# Patient Record
Sex: FEMALE | Race: BLACK OR AFRICAN AMERICAN | Employment: OTHER | URBAN - METROPOLITAN AREA
[De-identification: names, ages, dates, MRNs, and addresses within clinical notes are randomized per-mention and may not be internally consistent; named-entity substitution may affect disease eponyms.]

---

## 2018-08-06 ENCOUNTER — HOSPITAL ENCOUNTER (EMERGENCY)
Age: 78
Discharge: HOME | End: 2018-08-06
Attending: EMERGENCY MEDICINE
Payer: MEDICARE

## 2018-08-06 VITALS
WEIGHT: 189 LBS | HEART RATE: 85 BPM | RESPIRATION RATE: 16 BRPM | TEMPERATURE: 98.5 F | BODY MASS INDEX: 34.78 KG/M2 | OXYGEN SATURATION: 99 % | SYSTOLIC BLOOD PRESSURE: 158 MMHG | HEIGHT: 62 IN | DIASTOLIC BLOOD PRESSURE: 74 MMHG

## 2018-08-06 DIAGNOSIS — L03.818 CELLULITIS OF OTHER SPECIFIED SITE: Primary | ICD-10-CM

## 2018-08-06 DIAGNOSIS — R73.9 HYPERGLYCEMIA: ICD-10-CM

## 2018-08-06 LAB
ALBUMIN SERPL-MCNC: 3.1 G/DL (ref 3.2–4.6)
ALBUMIN/GLOB SERPL: 0.8 {RATIO} (ref 1.2–3.5)
ALP SERPL-CCNC: 143 U/L (ref 50–136)
ALT SERPL-CCNC: 92 U/L (ref 12–65)
ANION GAP SERPL CALC-SCNC: 8 MMOL/L (ref 7–16)
AST SERPL-CCNC: 71 U/L (ref 15–37)
BASOPHILS # BLD: 0 K/UL (ref 0–0.2)
BASOPHILS NFR BLD: 0 % (ref 0–2)
BILIRUB SERPL-MCNC: 0.2 MG/DL (ref 0.2–1.1)
BUN SERPL-MCNC: 36 MG/DL (ref 8–23)
CALCIUM SERPL-MCNC: 8.6 MG/DL (ref 8.3–10.4)
CHLORIDE SERPL-SCNC: 104 MMOL/L (ref 98–107)
CO2 SERPL-SCNC: 31 MMOL/L (ref 21–32)
CREAT SERPL-MCNC: 1.78 MG/DL (ref 0.6–1)
DIFFERENTIAL METHOD BLD: ABNORMAL
EOSINOPHIL # BLD: 0.2 K/UL (ref 0–0.8)
EOSINOPHIL NFR BLD: 3 % (ref 0.5–7.8)
ERYTHROCYTE [DISTWIDTH] IN BLOOD BY AUTOMATED COUNT: 14.2 % (ref 11.9–14.6)
ERYTHROCYTE [SEDIMENTATION RATE] IN BLOOD: 48 MM/HR (ref 0–30)
GLOBULIN SER CALC-MCNC: 3.9 G/DL (ref 2.3–3.5)
GLUCOSE BLD STRIP.AUTO-MCNC: 246 MG/DL (ref 65–100)
GLUCOSE SERPL-MCNC: 330 MG/DL (ref 65–100)
HCT VFR BLD AUTO: 34.8 % (ref 35.8–46.3)
HGB BLD-MCNC: 10.9 G/DL (ref 11.7–15.4)
IMM GRANULOCYTES # BLD: 0 K/UL (ref 0–0.5)
IMM GRANULOCYTES NFR BLD AUTO: 0 % (ref 0–5)
LYMPHOCYTES # BLD: 1.4 K/UL (ref 0.5–4.6)
LYMPHOCYTES NFR BLD: 18 % (ref 13–44)
MCH RBC QN AUTO: 29.9 PG (ref 26.1–32.9)
MCHC RBC AUTO-ENTMCNC: 31.3 G/DL (ref 31.4–35)
MCV RBC AUTO: 95.6 FL (ref 79.6–97.8)
MONOCYTES # BLD: 0.4 K/UL (ref 0.1–1.3)
MONOCYTES NFR BLD: 6 % (ref 4–12)
NEUTS SEG # BLD: 5.7 K/UL (ref 1.7–8.2)
NEUTS SEG NFR BLD: 73 % (ref 43–78)
PLATELET # BLD AUTO: 220 K/UL (ref 150–450)
PMV BLD AUTO: 10.8 FL (ref 10.8–14.1)
POTASSIUM SERPL-SCNC: 4.7 MMOL/L (ref 3.5–5.1)
PROT SERPL-MCNC: 7 G/DL (ref 6.3–8.2)
RBC # BLD AUTO: 3.64 M/UL (ref 4.05–5.25)
SODIUM SERPL-SCNC: 143 MMOL/L (ref 136–145)
WBC # BLD AUTO: 7.8 K/UL (ref 4.3–11.1)

## 2018-08-06 PROCEDURE — 99283 EMERGENCY DEPT VISIT LOW MDM: CPT | Performed by: EMERGENCY MEDICINE

## 2018-08-06 PROCEDURE — 96361 HYDRATE IV INFUSION ADD-ON: CPT | Performed by: EMERGENCY MEDICINE

## 2018-08-06 PROCEDURE — 74011250637 HC RX REV CODE- 250/637: Performed by: EMERGENCY MEDICINE

## 2018-08-06 PROCEDURE — 85652 RBC SED RATE AUTOMATED: CPT

## 2018-08-06 PROCEDURE — 74011636637 HC RX REV CODE- 636/637: Performed by: EMERGENCY MEDICINE

## 2018-08-06 PROCEDURE — 82962 GLUCOSE BLOOD TEST: CPT

## 2018-08-06 PROCEDURE — 74011250636 HC RX REV CODE- 250/636: Performed by: EMERGENCY MEDICINE

## 2018-08-06 PROCEDURE — 96374 THER/PROPH/DIAG INJ IV PUSH: CPT | Performed by: EMERGENCY MEDICINE

## 2018-08-06 PROCEDURE — 85025 COMPLETE CBC W/AUTO DIFF WBC: CPT

## 2018-08-06 PROCEDURE — 80053 COMPREHEN METABOLIC PANEL: CPT

## 2018-08-06 RX ORDER — DIPHENHYDRAMINE HCL 25 MG
25 CAPSULE ORAL
Status: COMPLETED | OUTPATIENT
Start: 2018-08-06 | End: 2018-08-06

## 2018-08-06 RX ORDER — CLINDAMYCIN HYDROCHLORIDE 300 MG/1
300 CAPSULE ORAL 4 TIMES DAILY
Qty: 28 CAP | Refills: 0 | Status: SHIPPED | OUTPATIENT
Start: 2018-08-06 | End: 2018-08-13

## 2018-08-06 RX ADMIN — SODIUM CHLORIDE 1000 ML: 900 INJECTION, SOLUTION INTRAVENOUS at 14:38

## 2018-08-06 RX ADMIN — DIPHENHYDRAMINE HYDROCHLORIDE 25 MG: 25 CAPSULE ORAL at 16:04

## 2018-08-06 RX ADMIN — INSULIN HUMAN 5 UNITS: 100 INJECTION, SOLUTION PARENTERAL at 14:38

## 2018-08-06 NOTE — ED NOTES
I have reviewed discharge instructions with the patient. The patient verbalized understanding. Patient left ED via Discharge Method: ambulatory to Home with daughter  Opportunity for questions and clarification provided. Patient given 1 scripts. To continue your aftercare when you leave the hospital, you may receive an automated call from our care team to check in on how you are doing. This is a free service and part of our promise to provide the best care and service to meet your aftercare needs.  If you have questions, or wish to unsubscribe from this service please call [phone]. Thank you for Choosing our [facility] Department.

## 2018-08-06 NOTE — ED TRIAGE NOTES
Per patient/family x1 month rash throughout extremities and swelling to portion of eyes, states treated previously with no resolution of problem.

## 2018-08-06 NOTE — DISCHARGE INSTRUCTIONS
Cellulitis: Care Instructions  Your Care Instructions    Cellulitis is a skin infection caused by bacteria, most often strep or staph. It often occurs after a break in the skin from a scrape, cut, bite, or puncture, or after a rash. Cellulitis may be treated without doing tests to find out what caused it. But your doctor may do tests, if needed, to look for a specific bacteria, like methicillin-resistant Staphylococcus aureus (MRSA). The doctor has checked you carefully, but problems can develop later. If you notice any problems or new symptoms, get medical treatment right away. Follow-up care is a key part of your treatment and safety. Be sure to make and go to all appointments, and call your doctor if you are having problems. It's also a good idea to know your test results and keep a list of the medicines you take. How can you care for yourself at home? · Take your antibiotics as directed. Do not stop taking them just because you feel better. You need to take the full course of antibiotics. · Prop up the infected area on pillows to reduce pain and swelling. Try to keep the area above the level of your heart as often as you can. · If your doctor told you how to care for your wound, follow your doctor's instructions. If you did not get instructions, follow this general advice:  ¨ Wash the wound with clean water 2 times a day. Don't use hydrogen peroxide or alcohol, which can slow healing. ¨ You may cover the wound with a thin layer of petroleum jelly, such as Vaseline, and a nonstick bandage. ¨ Apply more petroleum jelly and replace the bandage as needed. · Be safe with medicines. Take pain medicines exactly as directed. ¨ If the doctor gave you a prescription medicine for pain, take it as prescribed. ¨ If you are not taking a prescription pain medicine, ask your doctor if you can take an over-the-counter medicine.   To prevent cellulitis in the future  · Try to prevent cuts, scrapes, or other injuries to your skin. Cellulitis most often occurs where there is a break in the skin. · If you get a scrape, cut, mild burn, or bite, wash the wound with clean water as soon as you can to help avoid infection. Don't use hydrogen peroxide or alcohol, which can slow healing. · If you have swelling in your legs (edema), support stockings and good skin care may help prevent leg sores and cellulitis. · Take care of your feet, especially if you have diabetes or other conditions that increase the risk of infection. Wear shoes and socks. Do not go barefoot. If you have athlete's foot or other skin problems on your feet, talk to your doctor about how to treat them. When should you call for help? Call your doctor now or seek immediate medical care if:    · You have signs that your infection is getting worse, such as:  ¨ Increased pain, swelling, warmth, or redness. ¨ Red streaks leading from the area. ¨ Pus draining from the area. ¨ A fever.     · You get a rash.    Watch closely for changes in your health, and be sure to contact your doctor if:    · You do not get better as expected. Where can you learn more? Go to http://livier-andres.info/. Constanza Band in the search box to learn more about \"Cellulitis: Care Instructions. \"  Current as of: May 10, 2017  Content Version: 11.7  © 1640-7877 [employer]. Care instructions adapted under license by [employer] (which disclaims liability or warranty for this information). If you have questions about a medical condition or this instruction, always ask your healthcare professional. [place] [address] any warranty or liability for your use of this information.

## 2018-08-06 NOTE — ED PROVIDER NOTES
HPI Comments: 78-year-old female with history of hypertension, gout, diabetes presents with complaint of rash times one month to bilateral upper and lower extremities. States that over the past several days her rash has progressively worsen. Patient additionally reports she's noticed mild swelling to upper eyelids. States she is previously treated but no resolution of her symptoms. Patient denies fever, chills, nausea, vomiting, CP, SOB, hemoptysis. Denies recent new environmental exposure. Denies history of significant drug allergy or food allergy. Denies history of similar symptoms in the past.    Patient is a [de-identified] y.o. female presenting with rash. The history is provided by the patient. No  was used. Rash    This is a new problem. The current episode started more than 1 week ago. The problem has not changed since onset. There has been no fever. The rash is present on the right lower leg, left lower leg, left arm and right arm. The pain is at a severity of 0/10. The patient is experiencing no pain. The pain has been constant since onset. Pertinent negatives include no blisters, no pain, no weeping and no hives. She has tried nothing for the symptoms. The treatment provided no relief.         Past Medical History:   Diagnosis Date    CAD (coronary artery disease)     DM2 (diabetes mellitus, type 2) (HCC)     Gout     HLD (hyperlipidemia)     HTN (hypertension)     Peripheral neuropathy        Past Surgical History:   Procedure Laterality Date    CARDIAC SURG PROCEDURE UNLIST      stents x 3     HX CHOLECYSTECTOMY      HX CORONARY ARTERY BYPASS GRAFT      x3    HX TUBAL LIGATION           Family History:   Problem Relation Age of Onset    Hypertension Mother     Cancer Mother     Diabetes Mother     Heart Disease Mother        Social History     Social History    Marital status: SINGLE     Spouse name: N/A    Number of children: N/A    Years of education: N/A     Occupational History    Not on file. Social History Main Topics    Smoking status: Never Smoker    Smokeless tobacco: Never Used    Alcohol use No    Drug use: No    Sexual activity: Not on file     Other Topics Concern    Not on file     Social History Narrative         ALLERGIES: Sulfa (sulfonamide antibiotics)    Review of Systems   Constitutional: Negative for chills, fatigue and fever. HENT: Negative for congestion and rhinorrhea. Respiratory: Negative for cough and shortness of breath. Cardiovascular: Negative for chest pain and palpitations. Gastrointestinal: Negative for abdominal pain, diarrhea, nausea and vomiting. Genitourinary: Negative for dysuria, flank pain, genital sores, hematuria, pelvic pain, vaginal bleeding and vaginal discharge. Musculoskeletal: Negative for back pain, gait problem, joint swelling, neck pain and neck stiffness. Skin: Positive for rash. Negative for pallor. Neurological: Negative for dizziness, seizures, syncope, weakness, numbness and headaches. Psychiatric/Behavioral: Negative for agitation and confusion. Vitals:    08/06/18 1316   BP: 139/72   Pulse: 80   Resp: 18   Temp: 98.9 °F (37.2 °C)   SpO2: 95%   Weight: 85.7 kg (189 lb)   Height: 5' 2\" (1.575 m)            Physical Exam   Constitutional: She is oriented to person, place, and time. Patient well appearing in no acute distress. Nontoxic in appearance. HENT:   Head: Normocephalic. Mouth/Throat: Oropharynx is clear and moist. No oropharyngeal exudate. MMM. Uvula midline. No posterior oropharynx edema or swelling noted. No oral lesions noted. No stridor. No significant facial swelling or eyelid swelling noted. Eyes: Conjunctivae and EOM are normal. Pupils are equal, round, and reactive to light. Neck: Normal range of motion. No JVD present. No tracheal deviation present. Cardiovascular: Normal rate, regular rhythm, normal heart sounds and intact distal pulses.     Pulses 2+ and equal throughout. Pulmonary/Chest: Effort normal and breath sounds normal. She has no rales. CTAB. No stridor or wheezing noted. Abdominal: Soft. Soft, NTND. No rebound or guarding. No CVAT. Musculoskeletal: Normal range of motion. She exhibits no edema. Neurological: She is alert and oriented to person, place, and time. No cranial nerve deficit. Coordination normal.   Skin: Skin is warm and dry. Rash noted. There is erythema. Mild erythematous rash localized to bilateral upper extremities and lower extremities; concern for cellulitis to lower extremities. No urticaria. No drainage noted. Psychiatric: She has a normal mood and affect. Her behavior is normal.   Nursing note and vitals reviewed. MDM  Number of Diagnoses or Management Options  Cellulitis of other specified site: new and requires workup  Hyperglycemia: new and requires workup  Diagnosis management comments: Patient treated with IV fluid hydration, 5 units of insulin. WBC normal. ESR elevated. No evidence of allergic reaction. Will discharge home with Abx coverage. [facility] office and was able to make a follow-up appointment tomorrow at 10 AM at [facility] w/ Kailey Arredondo. Given should return precautions.        Amount and/or Complexity of Data Reviewed  Clinical lab tests: ordered and reviewed  Tests in the medicine section of CPT®: ordered and reviewed  Review and summarize past medical records: yes  Independent visualization of images, tracings, or specimens: yes    Risk of Complications, Morbidity, and/or Mortality  Presenting problems: moderate  Diagnostic procedures: low  Management options: low    Patient Progress  Patient progress: stable        ED Course       Procedures     Results Include:    Recent Results (from the past 24 hour(s))   CBC WITH AUTOMATED DIFF    Collection Time: 08/06/18  1:22 PM   Result Value Ref Range    WBC 7.8 4.3 - 11.1 K/uL    RBC 3.64 (L) 4.05 - 5.25 M/uL    HGB 10.9 (L) 11.7 - 15.4 g/dL    HCT 34.8 (L) 35.8 - 46.3 %    MCV 95.6 79.6 - 97.8 FL    MCH 29.9 26.1 - 32.9 PG    MCHC 31.3 (L) 31.4 - 35.0 g/dL    RDW 14.2 11.9 - 14.6 %    PLATELET 939 793 - 057 K/uL    MPV 10.8 10.8 - 14.1 FL    DF AUTOMATED      NEUTROPHILS 73 43 - 78 %    LYMPHOCYTES 18 13 - 44 %    MONOCYTES 6 4.0 - 12.0 %    EOSINOPHILS 3 0.5 - 7.8 %    BASOPHILS 0 0.0 - 2.0 %    IMMATURE GRANULOCYTES 0 0.0 - 5.0 %    ABS. NEUTROPHILS 5.7 1.7 - 8.2 K/UL    ABS. LYMPHOCYTES 1.4 0.5 - 4.6 K/UL    ABS. MONOCYTES 0.4 0.1 - 1.3 K/UL    ABS. EOSINOPHILS 0.2 0.0 - 0.8 K/UL    ABS. BASOPHILS 0.0 0.0 - 0.2 K/UL    ABS. IMM. GRANS. 0.0 0.0 - 0.5 K/UL   METABOLIC PANEL, COMPREHENSIVE    Collection Time: 08/06/18  1:22 PM   Result Value Ref Range    Sodium 143 136 - 145 mmol/L    Potassium 4.7 3.5 - 5.1 mmol/L    Chloride 104 98 - 107 mmol/L    CO2 31 21 - 32 mmol/L    Anion gap 8 7 - 16 mmol/L    Glucose 330 (H) 65 - 100 mg/dL    BUN 36 (H) 8 - 23 MG/DL    Creatinine 1.78 (H) 0.6 - 1.0 MG/DL    GFR est AA 35 (L) >60 ml/min/1.73m2    GFR est non-AA 29 (L) >60 ml/min/1.73m2    Calcium 8.6 8.3 - 10.4 MG/DL    Bilirubin, total 0.2 0.2 - 1.1 MG/DL    ALT (SGPT) 92 (H) 12 - 65 U/L    AST (SGOT) 71 (H) 15 - 37 U/L    Alk. phosphatase 143 (H) 50 - 136 U/L    Protein, total 7.0 6.3 - 8.2 g/dL    Albumin 3.1 (L) 3.2 - 4.6 g/dL    Globulin 3.9 (H) 2.3 - 3.5 g/dL    A-G Ratio 0.8 (L) 1.2 - 3.5           Primitivo Barrientos MD; 8/6/2018 @2:04 PM Voice dictation software was used during the making of this note. This software is not perfect and grammatical and other typographical errors may be present.   This note has not been proofread for errors.  ===================================================================

## 2018-12-22 ENCOUNTER — HOSPITAL ENCOUNTER (EMERGENCY)
Age: 78
Discharge: HOME | End: 2018-12-22
Attending: EMERGENCY MEDICINE
Payer: MEDICARE

## 2018-12-22 VITALS
SYSTOLIC BLOOD PRESSURE: 124 MMHG | RESPIRATION RATE: 20 BRPM | DIASTOLIC BLOOD PRESSURE: 64 MMHG | BODY MASS INDEX: 32.43 KG/M2 | TEMPERATURE: 97.5 F | HEART RATE: 65 BPM | WEIGHT: 183 LBS | HEIGHT: 63 IN | OXYGEN SATURATION: 96 %

## 2018-12-22 DIAGNOSIS — R04.0 EPISTAXIS: Primary | ICD-10-CM

## 2018-12-22 DIAGNOSIS — R73.9 HYPERGLYCEMIA: ICD-10-CM

## 2018-12-22 LAB
ALBUMIN SERPL-MCNC: 3.6 G/DL (ref 3.2–4.6)
ALBUMIN/GLOB SERPL: 0.9 {RATIO} (ref 1.2–3.5)
ALP SERPL-CCNC: 133 U/L (ref 50–136)
ALT SERPL-CCNC: 68 U/L (ref 12–65)
ANION GAP SERPL CALC-SCNC: 8 MMOL/L (ref 7–16)
AST SERPL-CCNC: 17 U/L (ref 15–37)
BILIRUB SERPL-MCNC: 0.3 MG/DL (ref 0.2–1.1)
BUN SERPL-MCNC: 67 MG/DL (ref 8–23)
CALCIUM SERPL-MCNC: 9.2 MG/DL (ref 8.3–10.4)
CHLORIDE SERPL-SCNC: 102 MMOL/L (ref 98–107)
CO2 SERPL-SCNC: 26 MMOL/L (ref 21–32)
CREAT SERPL-MCNC: 2.22 MG/DL (ref 0.6–1)
ERYTHROCYTE [DISTWIDTH] IN BLOOD BY AUTOMATED COUNT: 13.9 % (ref 11.9–14.6)
GLOBULIN SER CALC-MCNC: 4.2 G/DL (ref 2.3–3.5)
GLUCOSE BLD STRIP.AUTO-MCNC: 260 MG/DL (ref 65–100)
GLUCOSE BLD STRIP.AUTO-MCNC: 396 MG/DL (ref 65–100)
GLUCOSE BLD STRIP.AUTO-MCNC: 424 MG/DL (ref 65–100)
GLUCOSE SERPL-MCNC: 482 MG/DL (ref 65–100)
HCT VFR BLD AUTO: 35.3 % (ref 35.8–46.3)
HGB BLD-MCNC: 11.1 G/DL (ref 11.7–15.4)
MCH RBC QN AUTO: 29.3 PG (ref 26.1–32.9)
MCHC RBC AUTO-ENTMCNC: 31.4 G/DL (ref 31.4–35)
MCV RBC AUTO: 93.1 FL (ref 79.6–97.8)
NRBC # BLD: 0 K/UL (ref 0–0.2)
PLATELET # BLD AUTO: 192 K/UL (ref 150–450)
PMV BLD AUTO: 12.4 FL (ref 9.4–12.3)
POTASSIUM SERPL-SCNC: 5.8 MMOL/L (ref 3.5–5.1)
PROT SERPL-MCNC: 7.8 G/DL (ref 6.3–8.2)
RBC # BLD AUTO: 3.79 M/UL (ref 4.05–5.2)
SODIUM SERPL-SCNC: 136 MMOL/L (ref 136–145)
WBC # BLD AUTO: 5.6 K/UL (ref 4.3–11.1)

## 2018-12-22 PROCEDURE — 74011250636 HC RX REV CODE- 250/636: Performed by: EMERGENCY MEDICINE

## 2018-12-22 PROCEDURE — 96361 HYDRATE IV INFUSION ADD-ON: CPT | Performed by: EMERGENCY MEDICINE

## 2018-12-22 PROCEDURE — 96374 THER/PROPH/DIAG INJ IV PUSH: CPT | Performed by: EMERGENCY MEDICINE

## 2018-12-22 PROCEDURE — 96376 TX/PRO/DX INJ SAME DRUG ADON: CPT | Performed by: EMERGENCY MEDICINE

## 2018-12-22 PROCEDURE — 74011250637 HC RX REV CODE- 250/637: Performed by: EMERGENCY MEDICINE

## 2018-12-22 PROCEDURE — 85027 COMPLETE CBC AUTOMATED: CPT

## 2018-12-22 PROCEDURE — 74011000250 HC RX REV CODE- 250: Performed by: EMERGENCY MEDICINE

## 2018-12-22 PROCEDURE — 74011636637 HC RX REV CODE- 636/637: Performed by: EMERGENCY MEDICINE

## 2018-12-22 PROCEDURE — 99285 EMERGENCY DEPT VISIT HI MDM: CPT | Performed by: EMERGENCY MEDICINE

## 2018-12-22 PROCEDURE — 80053 COMPREHEN METABOLIC PANEL: CPT

## 2018-12-22 PROCEDURE — 82962 GLUCOSE BLOOD TEST: CPT

## 2018-12-22 RX ORDER — OXYMETAZOLINE HCL 0.05 %
2 SPRAY, NON-AEROSOL (ML) NASAL
Status: COMPLETED | OUTPATIENT
Start: 2018-12-22 | End: 2018-12-22

## 2018-12-22 RX ORDER — SODIUM CHLORIDE 9 MG/ML
2000 INJECTION, SOLUTION INTRAVENOUS CONTINUOUS
Status: DISCONTINUED | OUTPATIENT
Start: 2018-12-22 | End: 2018-12-22 | Stop reason: HOSPADM

## 2018-12-22 RX ORDER — SILVER NITRATE 38.21; 12.74 MG/1; MG/1
1 STICK TOPICAL ONCE
Status: COMPLETED | OUTPATIENT
Start: 2018-12-22 | End: 2018-12-22

## 2018-12-22 RX ADMIN — OXYMETAZOLINE HYDROCHLORIDE 2 SPRAY: 5 SPRAY NASAL at 12:44

## 2018-12-22 RX ADMIN — INSULIN HUMAN 10 UNITS: 100 INJECTION, SOLUTION PARENTERAL at 13:26

## 2018-12-22 RX ADMIN — SODIUM CHLORIDE 2000 ML: 900 INJECTION, SOLUTION INTRAVENOUS at 12:47

## 2018-12-22 RX ADMIN — SILVER NITRATE APPLICATORS 1 APPLICATOR: 25; 75 STICK TOPICAL at 13:00

## 2018-12-22 RX ADMIN — SODIUM CHLORIDE 1000 ML: 900 INJECTION, SOLUTION INTRAVENOUS at 15:13

## 2018-12-22 RX ADMIN — INSULIN HUMAN 10 UNITS: 100 INJECTION, SOLUTION PARENTERAL at 15:11

## 2018-12-22 NOTE — DISCHARGE INSTRUCTIONS
Nosebleeds: Care Instructions  Your Care Instructions    Nosebleeds are common, especially if you have colds or allergies. Many things can cause a nosebleed. Some nosebleeds stop on their own with pressure. Others need packing. Some get cauterized (sealed). If you have gauze or other packing materials in your nose, you will need to follow up with your doctor to have the packing removed. You may need more treatment if you get nosebleeds a lot. The doctor has checked you carefully, but problems can develop later. If you notice any problems or new symptoms, get medical treatment right away. Follow-up care is a key part of your treatment and safety. Be sure to make and go to all appointments, and call your doctor if you are having problems. It's also a good idea to know your test results and keep a list of the medicines you take. How can you care for yourself at home? · If you get another nosebleed:  ? Sit up and tilt your head slightly forward. This keeps blood from going down your throat. ? Use your thumb and index finger to pinch your nose shut for 10 minutes. Use a clock. Do not check to see if the bleeding has stopped before the 10 minutes are up. If the bleeding has not stopped, pinch your nose shut for another 10 minutes. ? When the bleeding has stopped, try not to pick, rub, or blow your nose for 12 hours. Avoiding these things helps keep your nose from bleeding again. · If your doctor prescribed antibiotics, take them as directed. Do not stop taking them just because you feel better. You need to take the full course of antibiotics. To prevent nosebleeds  · Do not blow your nose too hard. · Try not to lift or strain after a nosebleed. · Raise your head on a pillow while you sleep. · Put a thin layer of a saline- or water-based nasal gel, such as NasoGel, inside your nose. Put it on the septum, which divides your nostrils. This will prevent dryness that can cause nosebleeds.   · Use a vaporizer or humidifier to add moisture to your bedroom. Follow the directions for cleaning the machine. · Do not use aspirin, ibuprofen (Advil, Motrin), or naproxen (Aleve) for 36 to 48 hours after a nosebleed unless your doctor tells you to. You can use acetaminophen (Tylenol) for pain relief. · Talk to your doctor about stopping any other medicines you are taking. Some medicines may make you more likely to get a nosebleed. · Do not use cold medicines or nasal sprays without first talking to your doctor. They can make your nose dry. When should you call for help? Call 911 anytime you think you may need emergency care. For example, call if:    · You passed out (lost consciousness).    Call your doctor now or seek immediate medical care if:    · You get another nosebleed and your nose is still bleeding after you have applied pressure 3 times for 10 minutes each time (30 minutes total).     · There is a lot of blood running down the back of your throat even after you pinch your nose and tilt your head forward.     · You have a fever.     · You have sinus pain.    Watch closely for changes in your health, and be sure to contact your doctor if:    · You get nosebleeds often, even if they stop.     · You do not get better as expected. Where can you learn more? Go to http://livier-andres.info/. Enter S156 in the search box to learn more about \"Nosebleeds: Care Instructions. \"  Current as of: November 20, 2017  Content Version: 11.8  © 5487-3236 [employer]. Care instructions adapted under license by [employer] (which disclaims liability or warranty for this information). If you have questions about a medical condition or this instruction, always ask your healthcare professional. [address] any warranty or liability for your use of this information. Learning About High Blood Sugar  What is high blood sugar?     Your body turns the food you eat into glucose (sugar), which it uses for energy. But if your body isn't able to use the sugar right away, it can build up in your blood and lead to high blood sugar. When the amount of sugar in your blood stays too high for too much of the time, you may have diabetes. Diabetes is a disease that can cause serious health problems. The good news is that lifestyle changes may help you get your blood sugar back to normal and avoid or delay diabetes. What causes high blood sugar? Sugar (glucose) can build up in your blood if you:  · Are overweight. · Have a family history of diabetes. · Take certain medicines, such as steroids. What are the symptoms? Having high blood sugar may not cause any symptoms at all. Or it may make you feel very thirsty or very hungry. You may also urinate more often than usual, have blurry vision, or lose weight without trying. How is high blood sugar treated? You can take steps to lower your blood sugar level if you understand what makes it get higher. Your doctor may want you to learn how to test your blood sugar level at home. Then you can see how illness, stress, or different kinds of food or medicine raise or lower your blood sugar level. Other tests may be needed to see if you have diabetes. How can you prevent high blood sugar? · Watch your weight. If you're overweight, losing just a small amount of weight may help. Reducing fat around your waist is most important. · Limit the amount of calories, sweets, and unhealthy fat you eat. Ask your doctor if a dietitian can help you. A registered dietitian can help you create meal plans that fit your lifestyle. · Get at least 30 minutes of exercise on most days of the week. Exercise helps control your blood sugar. It also helps you maintain a healthy weight. Walking is a good choice. You also may want to do other activities, such as running, swimming, cycling, or playing tennis or team sports.   · If your doctor prescribed medicines, take them exactly as prescribed. Call your doctor if you think you are having a problem with your medicine. You will get more details on the specific medicines your doctor prescribes. Follow-up care is a key part of your treatment and safety. Be sure to make and go to all appointments, and call your doctor if you are having problems. It's also a good idea to know your test results and keep a list of the medicines you take. Where can you learn more? Go to http://livier-andres.info/. Enter O108 in the search box to learn more about \"Learning About High Blood Sugar. \"  Current as of: December 7, 2017  Content Version: 11.8  © 5270-1251 [employer]. Care instructions adapted under license by [employer] (which disclaims liability or warranty for this information). If you have questions about a medical condition or this instruction, always ask your healthcare professional. [address] any warranty or liability for your use of this information.

## 2018-12-22 NOTE — ED TRIAGE NOTES
Pt came in by ems for epitaxis x1 hr that has not stopped. Pt on eliquis and plavix. . Pt did not take DM meds last night or today. Pt was sitting on the commode when the nosebleed started. Pt's BP has been all over the place for EMS but pt has not symptoms. 2 attempts at IV with ems without success.

## 2018-12-22 NOTE — ED NOTES
I have reviewed discharge instructions with the patient. The patient verbalized understanding. Patient left ED via Discharge Method: wheelchair to Home with family  Opportunity for questions and clarification provided. Patient given 0 scripts. To continue your aftercare when you leave the hospital, you may receive an automated call from our care team to check in on how you are doing. This is a free service and part of our promise to provide the best care and service to meet your aftercare needs.  If you have questions, or wish to unsubscribe from this service please call [phone]. Thank you for Choosing our [facility] Emergency Department.

## 2018-12-22 NOTE — ED NOTES
Dr. Queenie Howard in room used nasal spray to be get pt to blow clots out of nose. Bleeding has ended for now.  Monitoring pt status

## 2018-12-22 NOTE — ED PROVIDER NOTES
Patient with high blood pressure, heart disease and diabetes. Is on eliquis for a DVT. Started with a nosebleed at 8:30 this morning. Unsure which nare was bleeding. Has nose clamp here with some improvement. Denies any weakness, numbness or lightheadedness. No chest pain, shortness of breath. The history is provided by the patient. No  was used. Epistaxis    This is a new problem. The current episode started 3 to 5 hours ago. The problem occurs constantly. The problem has been gradually improving. The problem is associated with nothing. The bleeding has been from both nares. She has tried applying pressure for the symptoms.         Past Medical History:   Diagnosis Date    CAD (coronary artery disease)     DM2 (diabetes mellitus, type 2) (HCC)     Gout     HLD (hyperlipidemia)     HTN (hypertension)     Peripheral neuropathy        Past Surgical History:   Procedure Laterality Date    CARDIAC SURG PROCEDURE UNLIST      stents x 3     HX CHOLECYSTECTOMY      HX CORONARY ARTERY BYPASS GRAFT      x3    HX TUBAL LIGATION           Family History:   Problem Relation Age of Onset    Hypertension Mother     Cancer Mother     Diabetes Mother     Heart Disease Mother        Social History     Socioeconomic History    Marital status: SINGLE     Spouse name: Not on file    Number of children: Not on file    Years of education: Not on file    Highest education level: Not on file   Social Needs    Financial resource strain: Not on file    Food insecurity - worry: Not on file    Food insecurity - inability: Not on file   [facility] needs - medical: Not on file   [employer] needs - non-medical: Not on file   Occupational History    Not on file   Tobacco Use    Smoking status: Never Smoker    Smokeless tobacco: Never Used   Substance and Sexual Activity    Alcohol use: No    Drug use: No    Sexual activity: Not on file   Other Topics Concern    Not on file Social History Narrative    Not on file         ALLERGIES: Sulfa (sulfonamide antibiotics)    Review of Systems   Constitutional: Negative for chills and fever. HENT: Positive for nosebleeds. Negative for dental problem. Eyes: Negative for pain and redness. Respiratory: Negative for chest tightness, shortness of breath and wheezing. Cardiovascular: Negative for chest pain and leg swelling. Gastrointestinal: Negative for abdominal pain, diarrhea, nausea and vomiting. Musculoskeletal: Negative for back pain, gait problem, neck pain and neck stiffness. Skin: Negative for color change and rash. Neurological: Negative for weakness, numbness and headaches. Vitals:    12/22/18 1215   BP: 113/57   Pulse: 74   Resp: 20   Temp: 97.5 °F (36.4 °C)   SpO2: 91%   Weight: 83 kg (183 lb)   Height: 5' 3\" (1.6 m)            Physical Exam   Constitutional: She is oriented to person, place, and time. She appears well-developed and well-nourished. HENT:   Head: Normocephalic and atraumatic. Neck: Normal range of motion. Neck supple. Cardiovascular: Normal rate and regular rhythm. Pulmonary/Chest: Effort normal and breath sounds normal.   Abdominal: Soft. Bowel sounds are normal. There is no tenderness. Musculoskeletal: Normal range of motion. She exhibits no edema. Neurological: She is alert and oriented to person, place, and time. Skin: Skin is warm and dry. MDM  Number of Diagnoses or Management Options  Diagnosis management comments: 1:17 PM  Nose clamp off. No active bleeding. We'll continue to monitor. 2:47 PM  Epistaxis controlled with no further bleeding. Blood sugar decreasing. We'll give one more dose of insulin  And discharge. .  Will discontinue the aspirin due to frequent nosebleeds.        Amount and/or Complexity of Data Reviewed  Clinical lab tests: ordered and reviewed  Tests in the medicine section of CPT®: ordered and reviewed    Patient Progress  Patient progress: stable         Procedures           Results Include:    Recent Results (from the past 24 hour(s))   CBC W/O DIFF    Collection Time: 12/22/18 12:21 PM   Result Value Ref Range    WBC 5.6 4.3 - 11.1 K/uL    RBC 3.79 (L) 4.05 - 5.2 M/uL    HGB 11.1 (L) 11.7 - 15.4 g/dL    HCT 35.3 (L) 35.8 - 46.3 %    MCV 93.1 79.6 - 97.8 FL    MCH 29.3 26.1 - 32.9 PG    MCHC 31.4 31.4 - 35.0 g/dL    RDW 13.9 11.9 - 14.6 %    PLATELET 680 971 - 876 K/uL    MPV 12.4 (H) 9.4 - 12.3 FL    ABSOLUTE NRBC 0.00 0.0 - 0.2 K/uL   METABOLIC PANEL, COMPREHENSIVE    Collection Time: 12/22/18 12:21 PM   Result Value Ref Range    Sodium 136 136 - 145 mmol/L    Potassium 5.8 (H) 3.5 - 5.1 mmol/L    Chloride 102 98 - 107 mmol/L    CO2 26 21 - 32 mmol/L    Anion gap 8 7 - 16 mmol/L    Glucose 482 (HH) 65 - 100 mg/dL    BUN 67 (H) 8 - 23 MG/DL    Creatinine 2.22 (H) 0.6 - 1.0 MG/DL    GFR est AA 27 (L) >60 ml/min/1.73m2    GFR est non-AA 23 (L) >60 ml/min/1.73m2    Calcium 9.2 8.3 - 10.4 MG/DL    Bilirubin, total 0.3 0.2 - 1.1 MG/DL    ALT (SGPT) 68 (H) 12 - 65 U/L    AST (SGOT) 17 15 - 37 U/L    Alk.  phosphatase 133 50 - 136 U/L    Protein, total 7.8 6.3 - 8.2 g/dL    Albumin 3.6 3.2 - 4.6 g/dL    Globulin 4.2 (H) 2.3 - 3.5 g/dL    A-G Ratio 0.9 (L) 1.2 - 3.5     GLUCOSE, POC    Collection Time: 12/22/18 12:38 PM   Result Value Ref Range    Glucose (POC) 424 (H) 65 - 100 mg/dL

## 2019-01-30 ENCOUNTER — HOSPITAL ENCOUNTER (INPATIENT)
Age: 79
LOS: 7 days | Discharge: NURSING, REHAB OR LONG-TERM CARE | DRG: 689 | End: 2019-02-06
Attending: EMERGENCY MEDICINE | Admitting: INTERNAL MEDICINE
Payer: MEDICARE

## 2019-01-30 ENCOUNTER — APPOINTMENT (OUTPATIENT)
Dept: CT IMAGING | Age: 79
DRG: 689 | End: 2019-01-30
Attending: EMERGENCY MEDICINE
Payer: MEDICARE

## 2019-01-30 DIAGNOSIS — R73.9 HYPERGLYCEMIA: Primary | ICD-10-CM

## 2019-01-30 DIAGNOSIS — N30.00 ACUTE CYSTITIS WITHOUT HEMATURIA: ICD-10-CM

## 2019-01-30 DIAGNOSIS — R56.9 SEIZURE-LIKE ACTIVITY (HCC): ICD-10-CM

## 2019-01-30 DIAGNOSIS — G03.9 MENINGITIS: ICD-10-CM

## 2019-01-30 DIAGNOSIS — G93.41 ACUTE METABOLIC ENCEPHALOPATHY: ICD-10-CM

## 2019-01-30 PROBLEM — E11.65 HYPERGLYCEMIA DUE TO TYPE 2 DIABETES MELLITUS (HCC): Status: ACTIVE | Noted: 2019-01-30

## 2019-01-30 LAB
ALBUMIN SERPL-MCNC: 3.6 G/DL (ref 3.2–4.6)
ALBUMIN/GLOB SERPL: 0.9 {RATIO} (ref 1.2–3.5)
ALP SERPL-CCNC: 155 U/L (ref 50–136)
ALT SERPL-CCNC: 54 U/L (ref 12–65)
ANION GAP SERPL CALC-SCNC: 8 MMOL/L (ref 7–16)
AST SERPL-CCNC: 16 U/L (ref 15–37)
ATRIAL RATE: 66 BPM
BACTERIA URNS QL MICRO: ABNORMAL /HPF
BASOPHILS # BLD: 0.1 K/UL (ref 0–0.2)
BASOPHILS NFR BLD: 1 % (ref 0–2)
BILIRUB SERPL-MCNC: 0.2 MG/DL (ref 0.2–1.1)
BNP SERPL-MCNC: 22 PG/ML
BUN SERPL-MCNC: 52 MG/DL (ref 8–23)
CALCIUM SERPL-MCNC: 9 MG/DL (ref 8.3–10.4)
CALCULATED P AXIS, ECG09: 59 DEGREES
CALCULATED R AXIS, ECG10: 43 DEGREES
CALCULATED T AXIS, ECG11: 84 DEGREES
CASTS URNS QL MICRO: ABNORMAL /LPF
CHLORIDE SERPL-SCNC: 99 MMOL/L (ref 98–107)
CO2 SERPL-SCNC: 27 MMOL/L (ref 21–32)
CREAT SERPL-MCNC: 1.87 MG/DL (ref 0.6–1)
DIAGNOSIS, 93000: NORMAL
DIFFERENTIAL METHOD BLD: ABNORMAL
EOSINOPHIL # BLD: 0.1 K/UL (ref 0–0.8)
EOSINOPHIL NFR BLD: 1 % (ref 0.5–7.8)
EPI CELLS #/AREA URNS HPF: ABNORMAL /HPF
ERYTHROCYTE [DISTWIDTH] IN BLOOD BY AUTOMATED COUNT: 14.3 % (ref 11.9–14.6)
GLOBULIN SER CALC-MCNC: 3.8 G/DL (ref 2.3–3.5)
GLUCOSE BLD STRIP.AUTO-MCNC: 383 MG/DL (ref 65–100)
GLUCOSE BLD STRIP.AUTO-MCNC: 438 MG/DL (ref 65–100)
GLUCOSE BLD STRIP.AUTO-MCNC: 485 MG/DL (ref 65–100)
GLUCOSE SERPL-MCNC: 587 MG/DL (ref 65–100)
HCT VFR BLD AUTO: 34 % (ref 35.8–46.3)
HGB BLD-MCNC: 10.9 G/DL (ref 11.7–15.4)
IMM GRANULOCYTES # BLD AUTO: 0.1 K/UL (ref 0–0.5)
IMM GRANULOCYTES NFR BLD AUTO: 1 % (ref 0–5)
LACTATE BLD-SCNC: 0.84 MMOL/L (ref 0.5–1.9)
LIPASE SERPL-CCNC: 975 U/L (ref 73–393)
LYMPHOCYTES # BLD: 1.2 K/UL (ref 0.5–4.6)
LYMPHOCYTES NFR BLD: 18 % (ref 13–44)
MAGNESIUM SERPL-MCNC: 2.6 MG/DL (ref 1.8–2.4)
MCH RBC QN AUTO: 29.9 PG (ref 26.1–32.9)
MCHC RBC AUTO-ENTMCNC: 32.1 G/DL (ref 31.4–35)
MCV RBC AUTO: 93.4 FL (ref 79.6–97.8)
MONOCYTES # BLD: 0.4 K/UL (ref 0.1–1.3)
MONOCYTES NFR BLD: 6 % (ref 4–12)
NEUTS SEG # BLD: 4.7 K/UL (ref 1.7–8.2)
NEUTS SEG NFR BLD: 74 % (ref 43–78)
NRBC # BLD: 0 K/UL (ref 0–0.2)
P-R INTERVAL, ECG05: 208 MS
PLATELET # BLD AUTO: 194 K/UL (ref 150–450)
PMV BLD AUTO: 11.9 FL (ref 9.4–12.3)
POTASSIUM SERPL-SCNC: 5.4 MMOL/L (ref 3.5–5.1)
PROT SERPL-MCNC: 7.4 G/DL (ref 6.3–8.2)
Q-T INTERVAL, ECG07: 436 MS
QRS DURATION, ECG06: 96 MS
QTC CALCULATION (BEZET), ECG08: 457 MS
RBC # BLD AUTO: 3.64 M/UL (ref 4.05–5.2)
RBC #/AREA URNS HPF: ABNORMAL /HPF
SODIUM SERPL-SCNC: 134 MMOL/L (ref 136–145)
TROPONIN I BLD-MCNC: 0 NG/ML (ref 0.02–0.05)
TSH SERPL DL<=0.005 MIU/L-ACNC: 1.26 UIU/ML (ref 0.36–3.74)
VENTRICULAR RATE, ECG03: 66 BPM
WBC # BLD AUTO: 6.4 K/UL (ref 4.3–11.1)
WBC URNS QL MICRO: >100 /HPF

## 2019-01-30 PROCEDURE — 77030019605

## 2019-01-30 PROCEDURE — 96372 THER/PROPH/DIAG INJ SC/IM: CPT | Performed by: EMERGENCY MEDICINE

## 2019-01-30 PROCEDURE — 74011000302 HC RX REV CODE- 302: Performed by: INTERNAL MEDICINE

## 2019-01-30 PROCEDURE — 74011250636 HC RX REV CODE- 250/636: Performed by: EMERGENCY MEDICINE

## 2019-01-30 PROCEDURE — 99285 EMERGENCY DEPT VISIT HI MDM: CPT | Performed by: EMERGENCY MEDICINE

## 2019-01-30 PROCEDURE — 81015 MICROSCOPIC EXAM OF URINE: CPT

## 2019-01-30 PROCEDURE — 83690 ASSAY OF LIPASE: CPT

## 2019-01-30 PROCEDURE — 87086 URINE CULTURE/COLONY COUNT: CPT

## 2019-01-30 PROCEDURE — 83735 ASSAY OF MAGNESIUM: CPT

## 2019-01-30 PROCEDURE — 74011636637 HC RX REV CODE- 636/637: Performed by: INTERNAL MEDICINE

## 2019-01-30 PROCEDURE — 74011250636 HC RX REV CODE- 250/636: Performed by: INTERNAL MEDICINE

## 2019-01-30 PROCEDURE — 82962 GLUCOSE BLOOD TEST: CPT

## 2019-01-30 PROCEDURE — 009U3ZX DRAINAGE OF SPINAL CANAL, PERCUTANEOUS APPROACH, DIAGNOSTIC: ICD-10-PCS | Performed by: RADIOLOGY

## 2019-01-30 PROCEDURE — 87088 URINE BACTERIA CULTURE: CPT

## 2019-01-30 PROCEDURE — 99218 HC RM OBSERVATION: CPT

## 2019-01-30 PROCEDURE — 84443 ASSAY THYROID STIM HORMONE: CPT

## 2019-01-30 PROCEDURE — 70450 CT HEAD/BRAIN W/O DYE: CPT

## 2019-01-30 PROCEDURE — 83880 ASSAY OF NATRIURETIC PEPTIDE: CPT

## 2019-01-30 PROCEDURE — 74011000258 HC RX REV CODE- 258: Performed by: INTERNAL MEDICINE

## 2019-01-30 PROCEDURE — 84484 ASSAY OF TROPONIN QUANT: CPT

## 2019-01-30 PROCEDURE — 80053 COMPREHEN METABOLIC PANEL: CPT

## 2019-01-30 PROCEDURE — 96360 HYDRATION IV INFUSION INIT: CPT | Performed by: EMERGENCY MEDICINE

## 2019-01-30 PROCEDURE — 86580 TB INTRADERMAL TEST: CPT | Performed by: INTERNAL MEDICINE

## 2019-01-30 PROCEDURE — 93005 ELECTROCARDIOGRAM TRACING: CPT | Performed by: EMERGENCY MEDICINE

## 2019-01-30 PROCEDURE — 87186 SC STD MICRODIL/AGAR DIL: CPT

## 2019-01-30 PROCEDURE — 85025 COMPLETE CBC W/AUTO DIFF WBC: CPT

## 2019-01-30 PROCEDURE — 65270000029 HC RM PRIVATE

## 2019-01-30 PROCEDURE — 74011636637 HC RX REV CODE- 636/637: Performed by: EMERGENCY MEDICINE

## 2019-01-30 PROCEDURE — 83605 ASSAY OF LACTIC ACID: CPT

## 2019-01-30 RX ORDER — DEXTROSE 50 % IN WATER (D50W) INTRAVENOUS SYRINGE
25-50 AS NEEDED
Status: DISCONTINUED | OUTPATIENT
Start: 2019-01-30 | End: 2019-02-06 | Stop reason: HOSPADM

## 2019-01-30 RX ORDER — NALOXONE HYDROCHLORIDE 0.4 MG/ML
0.4 INJECTION, SOLUTION INTRAMUSCULAR; INTRAVENOUS; SUBCUTANEOUS AS NEEDED
Status: DISCONTINUED | OUTPATIENT
Start: 2019-01-30 | End: 2019-02-06 | Stop reason: HOSPADM

## 2019-01-30 RX ORDER — SODIUM CHLORIDE 0.9 % (FLUSH) 0.9 %
5-40 SYRINGE (ML) INJECTION AS NEEDED
Status: DISCONTINUED | OUTPATIENT
Start: 2019-01-30 | End: 2019-02-06 | Stop reason: HOSPADM

## 2019-01-30 RX ORDER — ACETAMINOPHEN 325 MG/1
650 TABLET ORAL
Status: DISCONTINUED | OUTPATIENT
Start: 2019-01-30 | End: 2019-02-06 | Stop reason: HOSPADM

## 2019-01-30 RX ORDER — HEPARIN SODIUM 5000 [USP'U]/ML
5000 INJECTION, SOLUTION INTRAVENOUS; SUBCUTANEOUS EVERY 8 HOURS
Status: DISCONTINUED | OUTPATIENT
Start: 2019-01-30 | End: 2019-02-01

## 2019-01-30 RX ORDER — SODIUM CHLORIDE 9 MG/ML
100 INJECTION, SOLUTION INTRAVENOUS CONTINUOUS
Status: DISCONTINUED | OUTPATIENT
Start: 2019-01-30 | End: 2019-02-01

## 2019-01-30 RX ORDER — HYDRALAZINE HYDROCHLORIDE 20 MG/ML
20 INJECTION INTRAMUSCULAR; INTRAVENOUS
Status: DISCONTINUED | OUTPATIENT
Start: 2019-01-30 | End: 2019-02-06 | Stop reason: HOSPADM

## 2019-01-30 RX ORDER — BISACODYL 5 MG
5 TABLET, DELAYED RELEASE (ENTERIC COATED) ORAL DAILY PRN
Status: DISCONTINUED | OUTPATIENT
Start: 2019-01-30 | End: 2019-02-06 | Stop reason: HOSPADM

## 2019-01-30 RX ORDER — TRAMADOL HYDROCHLORIDE 50 MG/1
50 TABLET ORAL
Status: DISCONTINUED | OUTPATIENT
Start: 2019-01-30 | End: 2019-02-06 | Stop reason: HOSPADM

## 2019-01-30 RX ORDER — LORAZEPAM 0.5 MG/1
0.5 TABLET ORAL
Status: DISCONTINUED | OUTPATIENT
Start: 2019-01-30 | End: 2019-02-06 | Stop reason: HOSPADM

## 2019-01-30 RX ORDER — INSULIN GLARGINE 100 [IU]/ML
35 INJECTION, SOLUTION SUBCUTANEOUS
Status: DISCONTINUED | OUTPATIENT
Start: 2019-01-30 | End: 2019-01-31

## 2019-01-30 RX ORDER — ROPINIROLE 0.5 MG/1
0.5 TABLET, FILM COATED ORAL
Status: DISCONTINUED | OUTPATIENT
Start: 2019-01-30 | End: 2019-02-06 | Stop reason: HOSPADM

## 2019-01-30 RX ORDER — DIPHENHYDRAMINE HCL 25 MG
25 CAPSULE ORAL
Status: DISCONTINUED | OUTPATIENT
Start: 2019-01-30 | End: 2019-02-06 | Stop reason: HOSPADM

## 2019-01-30 RX ORDER — ZOLPIDEM TARTRATE 5 MG/1
5 TABLET ORAL
Status: DISCONTINUED | OUTPATIENT
Start: 2019-01-30 | End: 2019-02-06 | Stop reason: HOSPADM

## 2019-01-30 RX ORDER — ROSUVASTATIN CALCIUM 20 MG/1
20 TABLET, COATED ORAL
Status: DISCONTINUED | OUTPATIENT
Start: 2019-01-30 | End: 2019-02-06 | Stop reason: HOSPADM

## 2019-01-30 RX ORDER — LOSARTAN POTASSIUM 50 MG/1
100 TABLET ORAL DAILY
Status: DISCONTINUED | OUTPATIENT
Start: 2019-01-31 | End: 2019-02-01

## 2019-01-30 RX ORDER — AMOXICILLIN 250 MG
2 CAPSULE ORAL
Status: DISCONTINUED | OUTPATIENT
Start: 2019-01-30 | End: 2019-02-06 | Stop reason: HOSPADM

## 2019-01-30 RX ORDER — DEXTROSE 40 %
15 GEL (GRAM) ORAL AS NEEDED
Status: DISCONTINUED | OUTPATIENT
Start: 2019-01-30 | End: 2019-02-06 | Stop reason: HOSPADM

## 2019-01-30 RX ORDER — INSULIN LISPRO 100 [IU]/ML
INJECTION, SOLUTION INTRAVENOUS; SUBCUTANEOUS
Status: DISCONTINUED | OUTPATIENT
Start: 2019-01-30 | End: 2019-02-06 | Stop reason: HOSPADM

## 2019-01-30 RX ORDER — SODIUM CHLORIDE 0.9 % (FLUSH) 0.9 %
5-40 SYRINGE (ML) INJECTION EVERY 8 HOURS
Status: DISCONTINUED | OUTPATIENT
Start: 2019-01-30 | End: 2019-02-06 | Stop reason: HOSPADM

## 2019-01-30 RX ORDER — ONDANSETRON 2 MG/ML
4 INJECTION INTRAMUSCULAR; INTRAVENOUS
Status: DISCONTINUED | OUTPATIENT
Start: 2019-01-30 | End: 2019-02-06 | Stop reason: HOSPADM

## 2019-01-30 RX ORDER — METOPROLOL TARTRATE 25 MG/1
25 TABLET, FILM COATED ORAL 2 TIMES DAILY
Status: DISCONTINUED | OUTPATIENT
Start: 2019-01-30 | End: 2019-02-03

## 2019-01-30 RX ORDER — PANTOPRAZOLE SODIUM 40 MG/1
40 TABLET, DELAYED RELEASE ORAL
Status: DISCONTINUED | OUTPATIENT
Start: 2019-01-31 | End: 2019-02-06 | Stop reason: HOSPADM

## 2019-01-30 RX ORDER — MAG HYDROX/ALUMINUM HYD/SIMETH 200-200-20
30 SUSPENSION, ORAL (FINAL DOSE FORM) ORAL
Status: DISCONTINUED | OUTPATIENT
Start: 2019-01-30 | End: 2019-02-06 | Stop reason: HOSPADM

## 2019-01-30 RX ORDER — INSULIN LISPRO 100 [IU]/ML
10 INJECTION, SOLUTION INTRAVENOUS; SUBCUTANEOUS
Status: DISCONTINUED | OUTPATIENT
Start: 2019-01-30 | End: 2019-01-31

## 2019-01-30 RX ORDER — ASPIRIN 81 MG/1
81 TABLET ORAL DAILY
Status: DISCONTINUED | OUTPATIENT
Start: 2019-01-31 | End: 2019-02-06 | Stop reason: HOSPADM

## 2019-01-30 RX ORDER — HYDROCODONE BITARTRATE AND ACETAMINOPHEN 5; 325 MG/1; MG/1
1 TABLET ORAL
Status: DISCONTINUED | OUTPATIENT
Start: 2019-01-30 | End: 2019-02-06 | Stop reason: HOSPADM

## 2019-01-30 RX ORDER — COLCHICINE 0.6 MG/1
0.6 TABLET ORAL
Status: DISCONTINUED | OUTPATIENT
Start: 2019-01-30 | End: 2019-02-06 | Stop reason: HOSPADM

## 2019-01-30 RX ADMIN — Medication 10 ML: at 21:41

## 2019-01-30 RX ADMIN — INSULIN GLARGINE 35 UNITS: 100 INJECTION, SOLUTION SUBCUTANEOUS at 21:39

## 2019-01-30 RX ADMIN — INSULIN LISPRO 10 UNITS: 100 INJECTION, SOLUTION INTRAVENOUS; SUBCUTANEOUS at 17:15

## 2019-01-30 RX ADMIN — INSULIN LISPRO 10 UNITS: 100 INJECTION, SOLUTION INTRAVENOUS; SUBCUTANEOUS at 17:16

## 2019-01-30 RX ADMIN — CEFTRIAXONE SODIUM 1 G: 1 INJECTION, POWDER, FOR SOLUTION INTRAMUSCULAR; INTRAVENOUS at 17:10

## 2019-01-30 RX ADMIN — INSULIN LISPRO 10 UNITS: 100 INJECTION, SOLUTION INTRAVENOUS; SUBCUTANEOUS at 21:40

## 2019-01-30 RX ADMIN — INSULIN HUMAN 20 UNITS: 100 INJECTION, SUSPENSION SUBCUTANEOUS at 12:24

## 2019-01-30 RX ADMIN — TUBERCULIN PURIFIED PROTEIN DERIVATIVE 5 UNITS: 5 INJECTION, SOLUTION INTRADERMAL at 17:04

## 2019-01-30 RX ADMIN — Medication 10 ML: at 17:15

## 2019-01-30 RX ADMIN — HEPARIN SODIUM 5000 UNITS: 5000 INJECTION INTRAVENOUS; SUBCUTANEOUS at 21:39

## 2019-01-30 RX ADMIN — SODIUM CHLORIDE 100 ML/HR: 900 INJECTION, SOLUTION INTRAVENOUS at 12:29

## 2019-01-30 RX ADMIN — Medication 10 ML: at 15:15

## 2019-01-30 RX ADMIN — SODIUM CHLORIDE 1000 ML: 900 INJECTION, SOLUTION INTRAVENOUS at 10:26

## 2019-01-30 NOTE — PROGRESS NOTES
...TRANSFER - IN REPORT:    Verbal report received from Bella Rose  being received from ER. Report consisted of patients Situation, Background, Assessment and   Recommendations. Information from the following report  was reviewed with the receiving nurse. Opportunity for questions and clarification was provided. Assessment completed upon patients arrival to unit and care assumed.

## 2019-01-30 NOTE — ED PROVIDER NOTES
White female who lives alone with history of diabetes presents to the emergency department with altered mental status. According to family, when they called her, she stated she was depressed about take her medicines for the last 4 days, and today when they called she was altered. When they found her medications , which are kept in a daily dispenser, it appears she is not taking her medicines for at least 4 days. On EMS arrival, the patient was found altered with blood sugar that just read high. The history is provided by the patient, a relative and the EMS personnel. The history is limited by the condition of the patient. Altered mental status    This is a new problem. Episode onset: uncleart. The problem has not changed since onset. Associated symptoms include confusion, somnolence and weakness. Pertinent negatives include no seizures, no unresponsiveness, no agitation, no delusions, no hallucinations, no self-injury, no violence, no tingling and no numbness. Mental status baseline is normal.  Risk factors include the patient not taking meds correctly. Her past medical history is significant for diabetes, hypertension and heart disease. Her past medical history does not include seizures, liver disease, CVA, TIA, AIDS, COPD, depression, dementia, psychotropic medication treatment or head trauma.         Past Medical History:   Diagnosis Date    CAD (coronary artery disease)     DM2 (diabetes mellitus, type 2) (HCC)     Gout     HLD (hyperlipidemia)     HTN (hypertension)     Peripheral neuropathy        Past Surgical History:   Procedure Laterality Date    CARDIAC SURG PROCEDURE UNLIST      stents x 3     HX CHOLECYSTECTOMY      HX CORONARY ARTERY BYPASS GRAFT      x3    HX TUBAL LIGATION           Family History:   Problem Relation Age of Onset    Hypertension Mother     Cancer Mother     Diabetes Mother     Heart Disease Mother        Social History     Socioeconomic History    Marital status: SINGLE     Spouse name: Not on file    Number of children: Not on file    Years of education: Not on file    Highest education level: Not on file   Social Needs    Financial resource strain: Not on file    Food insecurity - worry: Not on file    Food insecurity - inability: Not on file    Transportation needs - medical: Not on file   [employer] needs - non-medical: Not on file   Occupational History    Not on file   Tobacco Use    Smoking status: Never Smoker    Smokeless tobacco: Never Used   Substance and Sexual Activity    Alcohol use: No    Drug use: No    Sexual activity: Not on file   Other Topics Concern    Not on file   Social History Narrative    Not on file         ALLERGIES: Sulfa (sulfonamide antibiotics)    Review of Systems   Unable to perform ROS: Mental status change   Neurological: Positive for weakness. Negative for tingling, seizures and numbness. Psychiatric/Behavioral: Positive for confusion. Negative for agitation, hallucinations and self-injury. There were no vitals filed for this visit. Physical Exam   Constitutional: She appears well-developed and well-nourished. She appears lethargic. No distress. HENT:   Head: Normocephalic and atraumatic. Right Ear: External ear normal.   Left Ear: External ear normal.   Mouth/Throat: Oropharynx is clear and moist.   Eyes: Conjunctivae and EOM are normal. Pupils are equal, round, and reactive to light. Neck: Normal range of motion. Neck supple. Cardiovascular: Normal rate, regular rhythm, normal heart sounds and intact distal pulses. Pulmonary/Chest: Effort normal and breath sounds normal.   Abdominal: Soft. Bowel sounds are normal. There is no tenderness. Musculoskeletal: Normal range of motion. She exhibits no edema. Neurological: She has normal strength. She appears lethargic. She is disoriented. She displays normal reflexes. No cranial nerve deficit. GCS eye subscore is 4.  GCS verbal subscore is 4. GCS motor subscore is 6. Patient extremely slow and variable in response to commands   Skin: Skin is warm and dry. Capillary refill takes less than 2 seconds. She is not diaphoretic. Chronic venous stasis changes noted bilateral lower extremities with dried, woody, slightly erythematous skin   Psychiatric: Her affect is blunt. Her speech is delayed. She is slowed. Thought content is not delusional. Cognition and memory are impaired. She is inattentive. Nursing note and vitals reviewed. MDM  Number of Diagnoses or Management Options  Acute cystitis without hematuria: new and requires workup  Acute metabolic encephalopathy: new and requires workup  Hyperglycemia: new and requires workup     Amount and/or Complexity of Data Reviewed  Clinical lab tests: ordered and reviewed  Tests in the radiology section of CPT®: ordered and reviewed  Review and summarize past medical records: yes  Independent visualization of images, tracings, or specimens: yes    Risk of Complications, Morbidity, and/or Mortality  Presenting problems: high  Diagnostic procedures: high  Management options: high    Critical Care  Total time providing critical care: (===================================================================  This patient is critically ill and there is a high probability of of imminent or life threatening deterioration in the patient's condition without immediate management. The nature of the patient's clinical problem is: hyperglycemia, acute metabolic encephalopathy, UTI    I have spent 45 minutes in direct patient care, documentation, review of labs/xrays/old records, discussion with Family, Staff, Colleague, Nursing . The time involved in the performance of separately reportable procedures was not counted toward critical care time.      Jair Altman MD; 1/30/2019 @7:38 PM  ===================================================================    )    Patient Progress  Patient progress: stable Procedures

## 2019-01-30 NOTE — PROGRESS NOTES
01/30/19 1516   Dual Skin Pressure Injury Assessment   Dual Skin Pressure Injury Assessment WDL     Dual skin assessment completed with Miriam Velasquez RN. Pt skin warm and dry. Previous surgical scars to abdomen. Small pin size open blister to the right lower abdominal fold. Skin is very flaky. L heel soft, will float heels.

## 2019-01-30 NOTE — PROGRESS NOTES
made initial visit. Pt was asleep appearing comfortable with no pain level expressed or observed. Pt's family was present and  spoke with them welcoming them to [address] [place] [address] and sharing information about  services.  provided spiritual care through presence, pastoral conversation, and assurance of prayer.

## 2019-01-30 NOTE — ED TRIAGE NOTES
EMS called to home for diabetic problem. Checked sugar but it resulted high. Alert & oriented to person and place. HR 60 SR, /80, 12 lead clear. 20g LFA. Given 150mL NS by EMS. Lives by herself. Seems she has not taken any of her meds in the last 4 days.

## 2019-01-30 NOTE — PROGRESS NOTES
Rounds completed pt disoriented, insulin administered as ordered. Family and visitors were at bedside earlier. Oxygen on , per daughter pt wears oxygen at home. No other needs at this time. Pt currently asleep no distress noted.

## 2019-01-30 NOTE — ROUTINE PROCESS
TRANSFER - OUT REPORT:    Verbal report given to Margi Robbins RN on Kenyatta Vernon  being transferred to 6th floor for routine progression of care       Report consisted of patients Situation, Background, Assessment and   Recommendations(SBAR). Information from the following report(s) ED Summary was reviewed with the receiving nurse. Lines:       Opportunity for questions and clarification was provided.       Patient transported with:

## 2019-01-30 NOTE — H&P
Hospitalist H&P Note     Admit Date:  2019 10:03 AM   Name:  Prince Morris   Age:  [de-identified] y.o.  :  1940   MRN:  [id number]   PCP:  Teofilo Bedoya MD  Treatment Team: Attending Provider: Lilly Mccallum MD; Primary Nurse: Gee Olvera RN; Utilization Review: Samson Nieves RN    HPI:   Pt is an [de-identified] y/o F with DM, HTN, recurrent UTIs on suppressive macrobid, who presented from home with altered mental status. She lives alone but has a caretaker who comes in the mornings to help her, but does not stay the whole day. Her daughter says the last time she was normal was on Monday morning; at that time they spoke on the phone and she sounded fine. However she reports that the caretaker had difficulty getting her out of bed and she was diffusely weak. On Tuesday she was altered and her speech was confusing to the daughter; thought process was disorganized and she rambled. Today she had significant decline and will not even talk to me in the exam room or make eye contact.   Family says she has not voluntarily complained of any symptoms such as fevers, pain, dysuria but they did not ask her     Cannot obtain ROS due to condition  Past Medical History:   Diagnosis Date    CAD (coronary artery disease)     DM2 [facility]diabetes mellitus, type 2) ([facility] [address]     Gout     HLD (hyperlipidemia)     HTN (hypertension)     Peripheral neuropathy       Past Surgical History:   Procedure Laterality Date    CARDIAC SURG PROCEDURE UNLIST      stents x 3     HX CHOLECYSTECTOMY      HX CORONARY ARTERY BYPASS GRAFT      x3    HX TUBAL LIGATION        Allergies   Allergen Reactions    Sulfa (Sulfonamide Antibiotics) Swelling      Social History     Tobacco Use    Smoking status: Never Smoker    Smokeless tobacco: Never Used   Substance Use Topics    Alcohol use: No      Family History   Problem Relation Age of Onset    Hypertension Mother     Cancer Mother     Diabetes Mother     Heart Disease Mother Immunization History   Administered Date(s) Administered    Influenza Vaccine 2014     PTA Medications:  Prior to Admission Medications   Prescriptions Last Dose Informant Patient Reported? Taking? Blood-Glucose Meter (ONETOUCH ULTRAMINI) monitoring kit   No No   Sig: Use as directed   Lancets (ONETOUCH ULTRASOFT LANCETS) misc   No No   Sig: Test 4 times daily as directed   Omeprazole delayed release (PRILOSEC D/R) 20 mg tablet   No No   Sig: Take 1 Tab by mouth daily. aspirin delayed-release 81 mg tablet   Yes No   Sig: Take  by mouth daily. colchicine (COLCRYS) 0.6 mg tablet   Yes No   Sig: Take 0.6 mg by mouth three (3) times daily as needed. furosemide (LASIX) 20 mg tablet   Yes No   Sig: Take  by mouth two (2) times a day.   gabapentin (NEURONTIN) 100 mg capsule   No No   Sig: Take 1 Cap by mouth three (3) times daily. glucose blood VI test strips (ONETOUCH ULTRA TEST) strip   No No   Sig: Test 4 times daily as directed   hydrochlorothiazide (HYDRODIURIL) 25 mg tablet   No No   Sig: Take 1 Tab by mouth daily. insulin glargine (LANTUS SOLOSTAR) 100 unit/mL (3 mL) pen   No No   Si Units by SubCUTAneous route nightly. insulin lispro (HUMALOG KWIKPEN) 100 unit/mL kwikpen   No No   Si Units SubQ with Breakfast; 24 Units SubQ with Lunch; 24 Units SubQ with Dinner. losartan (COZAAR) 100 mg tablet   Yes No   Sig: Take  by mouth daily. metoprolol (LOPRESSOR) 25 mg tablet   No No   Sig: Take 1 Tab by mouth two (2) times a day. nitrofurantoin (MACRODANTIN) 50 mg capsule   No No   Sig: Take 1 Cap by mouth nightly. rOPINIRole (REQUIP) 0.5 mg tablet   No No   Sig: Take 1 Tab by mouth nightly as needed. rosuvastatin (CRESTOR) 20 mg tablet   No No   Sig: Take 1 Tab by mouth nightly. traMADol (ULTRAM) 50 mg tablet   No No   Sig: Take 1 Tab by mouth every eight (8) hours as needed for Pain. Facility-Administered Medications: None       Objective:   No data found.      No intake or output data in the 24 hours ending 01/30/19 1340    *Note that automatically entered I/Os may not be accurate; dependent on patient compliance with collection and accurate  by assistants. Physical Exam:  General:    Well nourished. Somnolent, unresponsive. Eyes:   Normal sclera. Extraocular movements intact. Does not make eye contact  HENT:  Normocephalic, atraumatic. Moist mucous membranes  CV:   RRR. No m/r/g. Peripheral pulses 2+. Capillary refill <2s. Lungs:  CTAB. No wheezing, rhonchi, or rales. Abdomen: Soft, nontender, nondistended. Extremities: Warm and dry. No cyanosis or edema. Neurologic: CN II-XII grossly intact. Sensation intact. Skin:     No rashes or jaundice. Normal coloration    I reviewed the labs, imaging, EKGs, telemetry, and other studies done this admission. Data Review:   Recent Results (from the past 24 hour(s))   CBC WITH AUTOMATED DIFF    Collection Time: 01/30/19 10:32 AM   Result Value Ref Range    WBC 6.4 4.3 - 11.1 K/uL    RBC 3.64 (L) 4.05 - 5.2 M/uL    HGB 10.9 (L) 11.7 - 15.4 g/dL    HCT 34.0 (L) 35.8 - 46.3 %    MCV 93.4 79.6 - 97.8 FL    MCH 29.9 26.1 - 32.9 PG    MCHC 32.1 31.4 - 35.0 g/dL    RDW 14.3 11.9 - 14.6 %    PLATELET 534 207 - 196 K/uL    MPV 11.9 9.4 - 12.3 FL    ABSOLUTE NRBC 0.00 0.0 - 0.2 K/uL    DF AUTOMATED      NEUTROPHILS 74 43 - 78 %    LYMPHOCYTES 18 13 - 44 %    MONOCYTES 6 4.0 - 12.0 %    EOSINOPHILS 1 0.5 - 7.8 %    BASOPHILS 1 0.0 - 2.0 %    IMMATURE GRANULOCYTES 1 0.0 - 5.0 %    ABS. NEUTROPHILS 4.7 1.7 - 8.2 K/UL    ABS. LYMPHOCYTES 1.2 0.5 - 4.6 K/UL    ABS. MONOCYTES 0.4 0.1 - 1.3 K/UL    ABS. EOSINOPHILS 0.1 0.0 - 0.8 K/UL    ABS. BASOPHILS 0.1 0.0 - 0.2 K/UL    ABS. IMM.  GRANS. 0.1 0.0 - 0.5 K/UL   LIPASE    Collection Time: 01/30/19 10:32 AM   Result Value Ref Range    Lipase 975 (H) 73 - 393 U/L   BNP    Collection Time: 01/30/19 10:32 AM   Result Value Ref Range    BNP 22 (H) 0 pg/mL   MAGNESIUM    Collection Time: 01/30/19 10:32 AM   Result Value Ref Range    Magnesium 2.6 (H) 1.8 - 2.4 mg/dL   TSH 3RD GENERATION    Collection Time: 01/30/19 10:32 AM   Result Value Ref Range    TSH 1.260 0.358 - 1.001 uIU/mL   METABOLIC PANEL, COMPREHENSIVE    Collection Time: 01/30/19 10:32 AM   Result Value Ref Range    Sodium 134 (L) 136 - 145 mmol/L    Potassium 5.4 (H) 3.5 - 5.1 mmol/L    Chloride 99 98 - 107 mmol/L    CO2 27 21 - 32 mmol/L    Anion gap 8 7 - 16 mmol/L    Glucose 587 (HH) 65 - 100 mg/dL    BUN 52 (H) 8 - 23 MG/DL    Creatinine 1.87 (H) 0.6 - 1.0 MG/DL    GFR est AA 33 (L) >60 ml/min/1.73m2    GFR est non-AA 28 (L) >60 ml/min/1.73m2    Calcium 9.0 8.3 - 10.4 MG/DL    Bilirubin, total 0.2 0.2 - 1.1 MG/DL    ALT (SGPT) 54 12 - 65 U/L    AST (SGOT) 16 15 - 37 U/L    Alk. phosphatase 155 (H) 50 - 136 U/L    Protein, total 7.4 6.3 - 8.2 g/dL    Albumin 3.6 3.2 - 4.6 g/dL    Globulin 3.8 (H) 2.3 - 3.5 g/dL    A-G Ratio 0.9 (L) 1.2 - 3.5     POC LACTIC ACID    Collection Time: 01/30/19 11:03 AM   Result Value Ref Range    Lactic Acid (POC) 0.84 0.5 - 1.9 mmol/L   POC TROPONIN-I    Collection Time: 01/30/19 11:08 AM   Result Value Ref Range    Troponin-I (POC) 0 (L) 0.02 - 0.05 ng/ml   URINE MICROSCOPIC    Collection Time: 01/30/19 12:20 PM   Result Value Ref Range    WBC >100 (H) 0 /hpf    RBC 3-5 0 /hpf    Epithelial cells 0-3 0 /hpf    Bacteria 4+ (H) 0 /hpf    Casts 0-3 0 /lpf       All Micro Results     None          Current Facility-Administered Medications   Medication Dose Route Frequency    sodium chloride (NS) flush 5-40 mL  5-40 mL IntraVENous Q8H    sodium chloride (NS) flush 5-40 mL  5-40 mL IntraVENous PRN    0.9% sodium chloride infusion  100 mL/hr IntraVENous CONTINUOUS    cefTRIAXone (ROCEPHIN) 1 g in 0.9% sodium chloride (MBP/ADV) 50 mL  1 g IntraVENous Q24H     Current Outpatient Medications   Medication Sig    nitrofurantoin (MACRODANTIN) 50 mg capsule Take 1 Cap by mouth nightly.     insulin glargine (LANTUS SOLOSTAR) 100 unit/mL (3 mL) pen 34 Units by SubCUTAneous route nightly.  insulin lispro (HUMALOG KWIKPEN) 100 unit/mL kwikpen 24 Units SubQ with Breakfast; 24 Units SubQ with Lunch; 24 Units SubQ with Dinner.  hydrochlorothiazide (HYDRODIURIL) 25 mg tablet Take 1 Tab by mouth daily.  metoprolol (LOPRESSOR) 25 mg tablet Take 1 Tab by mouth two (2) times a day.  Blood-Glucose Meter (ONETOUCH ULTRAMINI) monitoring kit Use as directed    glucose blood VI test strips (ONETOUCH ULTRA TEST) strip Test 4 times daily as directed    Lancets (ONETOUCH ULTRASOFT LANCETS) misc Test 4 times daily as directed    losartan (COZAAR) 100 mg tablet Take  by mouth daily.  furosemide (LASIX) 20 mg tablet Take  by mouth two (2) times a day.  Omeprazole delayed release (PRILOSEC D/R) 20 mg tablet Take 1 Tab by mouth daily.  rosuvastatin (CRESTOR) 20 mg tablet Take 1 Tab by mouth nightly.  gabapentin (NEURONTIN) 100 mg capsule Take 1 Cap by mouth three (3) times daily.  colchicine (COLCRYS) 0.6 mg tablet Take 0.6 mg by mouth three (3) times daily as needed.  traMADol (ULTRAM) 50 mg tablet Take 1 Tab by mouth every eight (8) hours as needed for Pain.  aspirin delayed-release 81 mg tablet Take  by mouth daily.  rOPINIRole (REQUIP) 0.5 mg tablet Take 1 Tab by mouth nightly as needed. Other Studies:  Ct Head Wo Cont    Result Date: 1/30/2019  CT HEAD WITHOUT CONTRAST. INDICATION: Altered mental status. COMPARISON: 28 April 2015. TECHNIQUE:   5 mm axial scans from the skull base to the vertex. Our CT scanners use one or more of the following:  Automated exposure control, adjustment of the mA and or kV according to patient size, iterative reconstruction. FINDINGS:  No acute intraparenchymal hemorrhage or abnormal extra-axial fluid collection. The ventricles are normal size. There is white matter low attenuation is present, nonspecific, likely chronic small vessel disease.   Old tiny lacunar infarct left thalamus. No midline shift or mass effect. Included portion of the paranasal sinuses and orbits grossly unremarkable. No skull fracture. IMPRESSION:  Negative for acute intracranial abnormality. Chronic changes. Assessment and Plan:     Hospital Problems as of 1/30/2019 Date Reviewed: 1/30/2019          Codes Class Noted - Resolved POA    Hyperglycemia due to type 2 diabetes mellitus ([facility] [address] ICD-10-CM: E11.65  ICD-9-CM: 250.00  1/30/2019 - Present Yes        * (Principal) Acute metabolic encephalopathy UWA-76-NT: G93.41  ICD-9-CM: 348.31  1/30/2019 - Present Yes        Acute cystitis without hematuria ICD-10-CM: N30.00  ICD-9-CM: 595.0  1/30/2019 - Present Yes        Uncontrolled type 2 diabetes mellitus with hyperglycemia (HCC) (Chronic) ICD-10-CM: E11.65  ICD-9-CM: 250.02  6/15/2015 - Present Yes        Essential hypertension (Chronic) ICD-10-CM: I10  ICD-9-CM: 401.9  6/15/2015 - Present Yes        Chronic kidney disease, stage III (moderate) (HCC) (Chronic) ICD-10-CM: N18.3  ICD-9-CM: 585.3  6/15/2015 - Present Yes              Plan:  Inpatient; estimate she will need 2 days of IV therapy before alert enough to take PO based on current state    UTI with encephalopathy  -Rocephin  -Send urine culture; wasn't ordered  -IVF while altered; will not be taking PO in her current state. Holding home diuretics; need clarification tomorrow of why she is on lasix. No CHF hx documented  -hold home chronic nitrofurantoin therapy; not sure this is the best choice for suppression in an elderly person with CKD    DM2 with hyperglycemia  -resume home lantus  -cont home prandial insulin (give only if actually eating meals)  -ISS    HTN  -uncontrolled during my exam SBP in 190s. Resume home meds when able/taking PO.   PRN hydralazine    CKD  -appears to be near baseline    Discharge planning:  PPD/OT/PT/CM  DVT ppx: heparin  Code status:  Full  Estimated LOS:  Greater than 2 midnights  Risk: high    Signed:  Edda Santa MD

## 2019-01-31 PROBLEM — N18.30 ACUTE RENAL FAILURE SUPERIMPOSED ON STAGE 3 CHRONIC KIDNEY DISEASE (HCC): Status: ACTIVE | Noted: 2019-01-31

## 2019-01-31 PROBLEM — N17.9 ACUTE RENAL FAILURE SUPERIMPOSED ON STAGE 3 CHRONIC KIDNEY DISEASE (HCC): Status: ACTIVE | Noted: 2019-01-31

## 2019-01-31 LAB
ANION GAP SERPL CALC-SCNC: 8 MMOL/L (ref 7–16)
BASOPHILS # BLD: 0 K/UL (ref 0–0.2)
BASOPHILS # BLD: 0 K/UL (ref 0–0.2)
BASOPHILS NFR BLD: 0 % (ref 0–2)
BASOPHILS NFR BLD: 0 % (ref 0–2)
BUN SERPL-MCNC: 38 MG/DL (ref 8–23)
CALCIUM SERPL-MCNC: 9 MG/DL (ref 8.3–10.4)
CHLORIDE SERPL-SCNC: 109 MMOL/L (ref 98–107)
CO2 SERPL-SCNC: 25 MMOL/L (ref 21–32)
CREAT SERPL-MCNC: 1.48 MG/DL (ref 0.6–1)
DIFFERENTIAL METHOD BLD: ABNORMAL
DIFFERENTIAL METHOD BLD: ABNORMAL
EOSINOPHIL # BLD: 0 K/UL (ref 0–0.8)
EOSINOPHIL # BLD: 0 K/UL (ref 0–0.8)
EOSINOPHIL NFR BLD: 0 % (ref 0.5–7.8)
EOSINOPHIL NFR BLD: 0 % (ref 0.5–7.8)
ERYTHROCYTE [DISTWIDTH] IN BLOOD BY AUTOMATED COUNT: 14.6 % (ref 11.9–14.6)
ERYTHROCYTE [DISTWIDTH] IN BLOOD BY AUTOMATED COUNT: 15.1 % (ref 11.9–14.6)
GLUCOSE BLD STRIP.AUTO-MCNC: 165 MG/DL (ref 65–100)
GLUCOSE BLD STRIP.AUTO-MCNC: 221 MG/DL (ref 65–100)
GLUCOSE BLD STRIP.AUTO-MCNC: 274 MG/DL (ref 65–100)
GLUCOSE BLD STRIP.AUTO-MCNC: 290 MG/DL (ref 65–100)
GLUCOSE BLD STRIP.AUTO-MCNC: 311 MG/DL (ref 65–100)
GLUCOSE BLD STRIP.AUTO-MCNC: 347 MG/DL (ref 65–100)
GLUCOSE SERPL-MCNC: 348 MG/DL (ref 65–100)
HCT VFR BLD AUTO: 33.6 % (ref 35.8–46.3)
HCT VFR BLD AUTO: 34.2 % (ref 35.8–46.3)
HGB BLD-MCNC: 10.3 G/DL (ref 11.7–15.4)
HGB BLD-MCNC: 10.8 G/DL (ref 11.7–15.4)
IMM GRANULOCYTES # BLD AUTO: 0.1 K/UL (ref 0–0.5)
IMM GRANULOCYTES # BLD AUTO: 0.1 K/UL (ref 0–0.5)
IMM GRANULOCYTES NFR BLD AUTO: 1 % (ref 0–5)
IMM GRANULOCYTES NFR BLD AUTO: 1 % (ref 0–5)
LYMPHOCYTES # BLD: 0.7 K/UL (ref 0.5–4.6)
LYMPHOCYTES # BLD: 1.5 K/UL (ref 0.5–4.6)
LYMPHOCYTES NFR BLD: 13 % (ref 13–44)
LYMPHOCYTES NFR BLD: 7 % (ref 13–44)
MCH RBC QN AUTO: 29.3 PG (ref 26.1–32.9)
MCH RBC QN AUTO: 29.8 PG (ref 26.1–32.9)
MCHC RBC AUTO-ENTMCNC: 30.7 G/DL (ref 31.4–35)
MCHC RBC AUTO-ENTMCNC: 31.6 G/DL (ref 31.4–35)
MCV RBC AUTO: 94.2 FL (ref 79.6–97.8)
MCV RBC AUTO: 95.5 FL (ref 79.6–97.8)
MM INDURATION POC: NORMAL MM (ref 0–5)
MONOCYTES # BLD: 0.3 K/UL (ref 0.1–1.3)
MONOCYTES # BLD: 0.6 K/UL (ref 0.1–1.3)
MONOCYTES NFR BLD: 3 % (ref 4–12)
MONOCYTES NFR BLD: 5 % (ref 4–12)
NEUTS SEG # BLD: 8.6 K/UL (ref 1.7–8.2)
NEUTS SEG # BLD: 9.1 K/UL (ref 1.7–8.2)
NEUTS SEG NFR BLD: 81 % (ref 43–78)
NEUTS SEG NFR BLD: 89 % (ref 43–78)
NRBC # BLD: 0 K/UL (ref 0–0.2)
NRBC # BLD: 0 K/UL (ref 0–0.2)
PLATELET # BLD AUTO: 193 K/UL (ref 150–450)
PLATELET # BLD AUTO: 211 K/UL (ref 150–450)
PMV BLD AUTO: 11.5 FL (ref 9.4–12.3)
PMV BLD AUTO: 11.9 FL (ref 9.4–12.3)
POTASSIUM SERPL-SCNC: 4.8 MMOL/L (ref 3.5–5.1)
PPD POC: NORMAL NEGATIVE
PROLACTIN SERPL-MCNC: 5.9 NG/ML
RBC # BLD AUTO: 3.52 M/UL (ref 4.05–5.2)
RBC # BLD AUTO: 3.63 M/UL (ref 4.05–5.2)
SODIUM SERPL-SCNC: 142 MMOL/L (ref 136–145)
WBC # BLD AUTO: 11.3 K/UL (ref 4.3–11.1)
WBC # BLD AUTO: 9.7 K/UL (ref 4.3–11.1)

## 2019-01-31 PROCEDURE — 36415 COLL VENOUS BLD VENIPUNCTURE: CPT

## 2019-01-31 PROCEDURE — 97530 THERAPEUTIC ACTIVITIES: CPT

## 2019-01-31 PROCEDURE — 74011636637 HC RX REV CODE- 636/637: Performed by: INTERNAL MEDICINE

## 2019-01-31 PROCEDURE — 80048 BASIC METABOLIC PNL TOTAL CA: CPT

## 2019-01-31 PROCEDURE — 74011250636 HC RX REV CODE- 250/636: Performed by: FAMILY MEDICINE

## 2019-01-31 PROCEDURE — 85025 COMPLETE CBC W/AUTO DIFF WBC: CPT

## 2019-01-31 PROCEDURE — 74011000258 HC RX REV CODE- 258: Performed by: INTERNAL MEDICINE

## 2019-01-31 PROCEDURE — 84146 ASSAY OF PROLACTIN: CPT

## 2019-01-31 PROCEDURE — 95816 EEG AWAKE AND DROWSY: CPT | Performed by: FAMILY MEDICINE

## 2019-01-31 PROCEDURE — 65270000029 HC RM PRIVATE

## 2019-01-31 PROCEDURE — 97162 PT EVAL MOD COMPLEX 30 MIN: CPT

## 2019-01-31 PROCEDURE — 74011250636 HC RX REV CODE- 250/636: Performed by: INTERNAL MEDICINE

## 2019-01-31 PROCEDURE — 82962 GLUCOSE BLOOD TEST: CPT

## 2019-01-31 RX ORDER — INSULIN GLARGINE 100 [IU]/ML
45 INJECTION, SOLUTION SUBCUTANEOUS
Status: DISCONTINUED | OUTPATIENT
Start: 2019-01-31 | End: 2019-01-31

## 2019-01-31 RX ORDER — INSULIN GLARGINE 100 [IU]/ML
50 INJECTION, SOLUTION SUBCUTANEOUS
Status: DISCONTINUED | OUTPATIENT
Start: 2019-01-31 | End: 2019-02-01

## 2019-01-31 RX ORDER — LORAZEPAM 2 MG/ML
1 INJECTION INTRAMUSCULAR
Status: DISCONTINUED | OUTPATIENT
Start: 2019-01-31 | End: 2019-02-01

## 2019-01-31 RX ADMIN — SODIUM CHLORIDE 100 ML/HR: 900 INJECTION, SOLUTION INTRAVENOUS at 06:18

## 2019-01-31 RX ADMIN — INSULIN GLARGINE 25 UNITS: 100 INJECTION, SOLUTION SUBCUTANEOUS at 22:00

## 2019-01-31 RX ADMIN — HUMAN INSULIN 15 UNITS: 100 INJECTION, SUSPENSION SUBCUTANEOUS at 11:00

## 2019-01-31 RX ADMIN — INSULIN LISPRO 6 UNITS: 100 INJECTION, SOLUTION INTRAVENOUS; SUBCUTANEOUS at 11:30

## 2019-01-31 RX ADMIN — Medication 10 ML: at 21:03

## 2019-01-31 RX ADMIN — Medication 10 ML: at 21:04

## 2019-01-31 RX ADMIN — Medication 10 ML: at 06:17

## 2019-01-31 RX ADMIN — INSULIN LISPRO 10 UNITS: 100 INJECTION, SOLUTION INTRAVENOUS; SUBCUTANEOUS at 09:18

## 2019-01-31 RX ADMIN — Medication 10 ML: at 16:16

## 2019-01-31 RX ADMIN — HEPARIN SODIUM 5000 UNITS: 5000 INJECTION INTRAVENOUS; SUBCUTANEOUS at 06:17

## 2019-01-31 RX ADMIN — INSULIN LISPRO 2 UNITS: 100 INJECTION, SOLUTION INTRAVENOUS; SUBCUTANEOUS at 16:30

## 2019-01-31 RX ADMIN — HYDRALAZINE HYDROCHLORIDE 20 MG: 20 INJECTION INTRAMUSCULAR; INTRAVENOUS at 09:27

## 2019-01-31 RX ADMIN — HEPARIN SODIUM 5000 UNITS: 5000 INJECTION INTRAVENOUS; SUBCUTANEOUS at 21:03

## 2019-01-31 RX ADMIN — Medication 10 ML: at 14:00

## 2019-01-31 RX ADMIN — LORAZEPAM 1 MG: 2 INJECTION INTRAMUSCULAR; INTRAVENOUS at 22:08

## 2019-01-31 RX ADMIN — HEPARIN SODIUM 5000 UNITS: 5000 INJECTION INTRAVENOUS; SUBCUTANEOUS at 16:15

## 2019-01-31 RX ADMIN — CEFTRIAXONE SODIUM 1 G: 1 INJECTION, POWDER, FOR SOLUTION INTRAMUSCULAR; INTRAVENOUS at 18:02

## 2019-01-31 NOTE — PROGRESS NOTES
Hourly rounds completed during shift. Pt only responsive to sternal rub all of shift. All needs met, bed locked in low position and call light within reach. Will give report to oncoming RN.

## 2019-01-31 NOTE — PROGRESS NOTES
Problem: Mobility Impaired (Adult and Pediatric)  Goal: *Acute Goals and Plan of Care (Insert Text)  1. Ms. Azul Medina will perform supine to sit and sit to supine with contact guard in 7 days. 2.  Ms. Azul Medina will sit edge of bed unsupported in 7 days. 3.  Ms. Azul Medina will perform sit to stand and bed to chair with min assist in 7 days. 4.  Ms. Azul Medina will perform therex to bilateral lower extremities x 25 reps in 7 days. PHYSICAL THERAPY: Initial Assessment and Daily Note 1/31/2019  INPATIENT: PT Visit Days : 1  Payor: CARE IMPROVEMENT PLUS / Plan[place] [place] / Product Type: Managed Care Medicare /       NAME/AGE/GENDER: Kem Cain is a [de-identified] y.o. female   PRIMARY DIAGNOSIS: Hyperglycemia due to type 2 diabetes mellitus ([facility] [address]  Acute metabolic encephalopathy Acute metabolic encephalopathy Acute metabolic encephalopathy       ICD-10: Treatment Diagnosis:    · Generalized Muscle Weakness (M62.81)  · Difficulty in walking, Not elsewhere classified (R26.2)   Precaution/Allergies:  Sulfa (sulfonamide antibiotics)      ASSESSMENT:     Ms. Auzl Medina presents with decreased mobility and decreased gait with AMS. Minimally responsive but she is in there. She resisted some movements and not others. PT got her to the edge of the bed with max assist but once there she sat with min assist for nearly 10 minutes. She crossed her arms and put her head on my shoulder as I sat next to her. When she was laid back down and a turn was initiated by me she reached over and grabbed the rail to help. Ms. Azul Mednia daughter is present and obviously concerned about her mom. Explained to her that her wanted her mom to go to rehab was good and that as her infection got better she would do more. Ms. Azul Medina is functioning well below baseline as she was living by herself.   She is therefore appropriate for skilled PT to maximize her rehab potential.     This section established at most recent assessment   PROBLEM LIST (Impairments causing functional limitations):  1. Decreased Strength  2. Decreased Transfer Abilities  3. Decreased Ambulation Ability/Technique  4. Decreased Activity Tolerance   INTERVENTIONS PLANNED: (Benefits and precautions of physical therapy have been discussed with the patient.)  1. Bed Mobility  2. Gait Training  3. Therapeutic Activites  4. Therapeutic Exercise/Strengthening  5. Transfer Training     TREATMENT PLAN: Frequency/Duration: 4 times a week for duration of hospital stay  Rehabilitation Potential For Stated Goals: Good     RECOMMENDED REHABILITATION/EQUIPMENT: (at time of discharge pending progress): Due to the probability of continued deficits (see above) this patient will likely need continued skilled physical therapy after discharge. Equipment:    to be determined. HISTORY:   History of Present Injury/Illness (Reason for Referral):  Pt is an [de-identified] y/o F with DM, HTN, recurrent UTIs on suppressive macrobid, who presented from home with altered mental status. She lives alone but has a caretaker who comes in the mornings to help her, but does not stay the whole day. Her daughter says the last time she was normal was on Monday morning; at that time they spoke on the phone and she sounded fine. However she reports that the caretaker had difficulty getting her out of bed and she was diffusely weak. On Tuesday she was altered and her speech was confusing to the daughter; thought process was disorganized and she rambled. Today she had significant decline and will not even talk to me in the exam room or make eye contact. Family says she has not voluntarily complained of any symptoms such as fevers, pain, dysuria but they did not ask her       Past Medical History/Comorbidities:   Ms. Patty Baltazar  has a past medical history of CAD (coronary artery disease), DM2 (diabetes mellitus, type 2) ([facility] [address] Gout, HLD (hyperlipidemia), HTN (hypertension), and Peripheral neuropathy.   Ms. Patty Baltazar  has a past surgical history that includes pr cardiac surg procedure unlist; hx cholecystectomy; hx tubal ligation; and hx coronary artery bypass graft. Social History/Living Environment:   Home Environment: Apartment  One/Two Story Residence: One story  Living Alone: Yes  Support Systems: Friends \ neighbors, Child(marga)  Patient Expects to be Discharged to[de-identified] Rehabilitation facility  Current DME Used/Available at Home: Walker, rolling, Cane, straight, Wheelchair  Prior Level of Function/Work/Activity:  Independent with rolling walker . age   Number of Personal Factors/Comorbidities that affect the Plan of Care: 1-2: MODERATE COMPLEXITY   EXAMINATION:   Most Recent Physical Functioning:   Gross Assessment:  AROM: Generally decreased, functional  PROM: Within functional limits  Strength: Generally decreased, functional               Posture:  Posture (WDL): Exceptions to WDL  Posture Assessment: Forward head, Rounded shoulders  Balance:  Sitting: Impaired; With support  Sitting - Static: Fair (occasional); Prop sitting  Sitting - Dynamic: Poor (constant support)  Standing: (not able) Bed Mobility:  Rolling: Moderate assistance  Supine to Sit: Moderate assistance  Sit to Supine: Maximum assistance  Scooting: Maximum assistance(she helped.  )  Wheelchair Mobility:     Transfers:  Sit to Stand: (attempted but she resisted. )  Gait:            Body Structures Involved:  1. Muscles Body Functions Affected:  1. Movement Related Activities and Participation Affected:  1. Mobility   Number of elements that affect the Plan of Care: 3: MODERATE COMPLEXITY   CLINICAL PRESENTATION:   Presentation: Evolving clinical presentation with changing clinical characteristics: MODERATE COMPLEXITY   CLINICAL DECISION MAKIN [facility] [place] Mobility Inpatient Short Form  How much difficulty does the patient currently have. .. Unable A Lot A Little None   1. Turning over in bed (including adjusting bedclothes, sheets and blankets)? [] 1   [x] 2   [] 3   [] 4   2. Sitting down on and standing up from a chair with arms ( e.g., wheelchair, bedside commode, etc.)   [] 1   [x] 2   [] 3   [] 4   3. Moving from lying on back to sitting on the side of the bed? [] 1   [x] 2   [] 3   [] 4   How much help from another person does the patient currently need. .. Total A Lot A Little None   4. Moving to and from a bed to a chair (including a wheelchair)? [x] 1   [] 2   [] 3   [] 4   5. Need to walk in hospital room? [x] 1   [] 2   [] 3   [] 4   6. Climbing 3-5 steps with a railing? [x] 1   [] 2   [] 3   [] 4   © 2007, Trustees of [facility] [employer], under license to [employer]. All rights reserved      Score:  Initial: 9 Most Recent: X (Date: -- )    Interpretation of Tool:  Represents activities that are increasingly more difficult (i.e. Bed mobility, Transfers, Gait). Medical Necessity:     · Patient is expected to demonstrate progress in functional technique to decrease assistance required with mobility and gait. .  Reason for Services/Other Comments:  · Patient continues to require present interventions due to patient's inability to function at baseline. .   Use of outcome tool(s) and clinical judgement create a POC that gives a: Questionable prediction of patient's progress: MODERATE COMPLEXITY            TREATMENT:   (In addition to Assessment/Re-Assessment sessions the following treatments were rendered)   Pre-treatment Symptoms/Complaints:  none  Pain: Initial:   Pain Intensity 1: 0  Post Session:  none     Therapeutic Activity: (    10): Therapeutic activities including Bed transfers to improve mobility. Required maximal   to promote motor control of bilateral, upper extremity(s), lower extremity(s).          Braces/Orthotics/Lines/Etc:   · IV  · O2 Device: Room air  Treatment/Session Assessment:    · Response to Treatment:  fair  · Interdisciplinary Collaboration:   o Registered Nurse  · After treatment position/precautions: o Supine in bed  o Call light within reach  o RN notified   · Compliance with Program/Exercises: Will assess as treatment progresses  · Recommendations/Intent for next treatment session: \"Next visit will focus on advancements to more challenging activities and reduction in assistance provided\".   Total Treatment Duration:  PT Patient Time In/Time Out  Time In: 1105  Time Out: 201 Елена Linder, PT

## 2019-01-31 NOTE — PROGRESS NOTES
Problem: Nutrition Deficit  Goal: *Optimize nutritional status  Nutrition  Reason for assessment: Received referral from Malnutrition Screening Tool: Recently Lost weight without trying [Unsure]. Eating poorly d/t decreased appetite [Yes]  Assessment:   Diet order(s): Regular with Ensure Enlive tid  Food/Nutrition Patient History:  DM, CKD, HTN, recurrent UTIs. Presented with AMS. Findings of UTU, uncontrolled HTN and hyperglycemia. Pt is not responding to queries at this time. Daughter reports pt was last know to be eating normally on Sunday and that she typically has no problems with her appetite and \"eats all day\". She gets 3 cases of Boost Glucose Control a month and usually drinks 1 bottle a day. It is unknown why she receives or drinks this. Daughter says she has noticed some subjective weight loss, but then when she has mentioned it to pt that the patient downplays it ans says she needs to and has been trying to lose weight. Daughter says the amount of weight loss appears to be minimal. The last day pt was eating normally was 1-27 and then she just drank 2 bottles of Boost Glucose Control on 1-28 and then ate nothing 1-29 or thus far today. Anthropometrics:Per EMR maximum height 5' 3\". However daughter days she used to be 5'8\" to 5'9\" and now has a curvature of her back. Weight hx per EMR ( Based on connect care functionality, RD cannot know if these weight are actual versus stated):   WT / BMI WEIGHT   12/22/2018 183 lb   8/6/2018 189 lb    No current weight. Therefore unable to assess BMI or weight loss. Has an active order to obtain a weight.   Macronutrient needs:  EER:  4800-8629 kcal /day (20-25 kcal/kg UBW of 183#)  EPR:  57-64 grams protein/day (0.9-1 grams/kg IBW)-using stated Ht of 5'8\"  Intake/Comparative Standards: No intake data but based on stated intake of no meal intake per daughter, this does not meet kcal or protein needs    Nutrition Diagnosis: Inadequate oral intake r/t decreased ability to consume sufficient oral intake as evidenced by AMS limits po intake x 4 days as above    Intervention:  Awaiting weight. Meals and snacks: Change to CCHO diet but continue to hold po until pt alert. Nutrition Supplement Therapy: Discontinue Ensure Enlive. Add Glucerna shake tid   Discharge nutrition plan: Too soon to determine.     Gene Hardwick, [address], [address] [place] [address], [address] [place] [address]

## 2019-01-31 NOTE — PROGRESS NOTES
1/31/2019  Attempted to see patient for OT assessment, however pt was too drowsy to participate. Will re-attempt again as time permits.    Thank you,  Satnam Hammond, OT

## 2019-01-31 NOTE — PROGRESS NOTES
Oral medicines held, pt very disoriented. Pt not opening her mouth. When attempted to administered juice pt clinched down on her mouth and pushed the nurses hand away. Scheduled insulin administered but not ss, will continue to monitor BS. IV prn BP med administered.

## 2019-01-31 NOTE — PROGRESS NOTES
Hospitalist Progress Note     Admit Date:  2019 10:03 AM   Name:  Dm Velasco   Age:  [de-identified] y.o.  :  1940   MRN:  [id number]   PCP:  Lee Ann Concepcion MD  Treatment Team: Attending Provider: Marcell Fried MD; Utilization Review: Diana Reardon RN; Primary Nurse: Noel Venegas Charge Nurse: Jaylin Turner, RN; Care Manager: Stacey Bose LMSW    Subjective:   Pt is an [de-identified] y/o F with DM, HTN, recurrent UTIs on suppressive macrobid, who presented from home with altered mental status. Was severely altered and could not respond or make eye contact. She has a caretaker who helps her in the morning but otherwise lives alone. Was admitted with UTI and AoCKD. Started on rocephin, IVF     - mental status still unchanged. Will open eyes to voice and touch but mumbles and does not make eye contact. Does not appear to be uncomfortable      Objective:     Patient Vitals for the past 24 hrs:   Temp Pulse Resp BP SpO2   19 0803 98.4 °F (36.9 °C) 90 18 (!) 152/95 96 %   19 0440 98.2 °F (36.8 °C) 95 18 152/78 91 %   19 2348 98.2 °F (36.8 °C) 82 18 113/61 94 %   19 1920 98.5 °F (36.9 °C) 61 18 144/70 94 %   19 1522 98.7 °F (37.1 °C) 75 18 150/75 94 %   19 1353 -- 66 -- 170/81 99 %   19 1230 -- 75 -- 194/84 100 %     Oxygen Therapy  O2 Sat (%): 96 % (19 0803)  O2 Device: Room air (19 1353)  No intake or output data in the 24 hours ending 19 1020    *Note that automatically entered I/Os may not be accurate; dependent on patient compliance with collection and accurate  by assistants. Physical Exam:  General:    Well nourished. Somnolent, unresponsive. Eyes:   Normal sclera. Extraocular movements intact. Does not make eye contact  HENT:  Normocephalic, atraumatic. Moist mucous membranes  CV:   RRR. No m/r/g. Lungs:  CTAB. No wheezing, rhonchi, or rales. Extremities: Warm and dry.   No cyanosis or edema. Neurologic: CN II-XII grossly intact. Sensation intact. Skin:     No rashes or jaundice. Normal coloration    I reviewed the labs, imaging, EKGs, telemetry, and other studies done this admission. Data Review:   Recent Results (from the past 24 hour(s))   CBC WITH AUTOMATED DIFF    Collection Time: 01/30/19 10:32 AM   Result Value Ref Range    WBC 6.4 4.3 - 11.1 K/uL    RBC 3.64 (L) 4.05 - 5.2 M/uL    HGB 10.9 (L) 11.7 - 15.4 g/dL    HCT 34.0 (L) 35.8 - 46.3 %    MCV 93.4 79.6 - 97.8 FL    MCH 29.9 26.1 - 32.9 PG    MCHC 32.1 31.4 - 35.0 g/dL    RDW 14.3 11.9 - 14.6 %    PLATELET 209 408 - 700 K/uL    MPV 11.9 9.4 - 12.3 FL    ABSOLUTE NRBC 0.00 0.0 - 0.2 K/uL    DF AUTOMATED      NEUTROPHILS 74 43 - 78 %    LYMPHOCYTES 18 13 - 44 %    MONOCYTES 6 4.0 - 12.0 %    EOSINOPHILS 1 0.5 - 7.8 %    BASOPHILS 1 0.0 - 2.0 %    IMMATURE GRANULOCYTES 1 0.0 - 5.0 %    ABS. NEUTROPHILS 4.7 1.7 - 8.2 K/UL    ABS. LYMPHOCYTES 1.2 0.5 - 4.6 K/UL    ABS. MONOCYTES 0.4 0.1 - 1.3 K/UL    ABS. EOSINOPHILS 0.1 0.0 - 0.8 K/UL    ABS. BASOPHILS 0.1 0.0 - 0.2 K/UL    ABS. IMM.  GRANS. 0.1 0.0 - 0.5 K/UL   LIPASE    Collection Time: 01/30/19 10:32 AM   Result Value Ref Range    Lipase 975 (H) 73 - 393 U/L   BNP    Collection Time: 01/30/19 10:32 AM   Result Value Ref Range    BNP 22 (H) 0 pg/mL   MAGNESIUM    Collection Time: 01/30/19 10:32 AM   Result Value Ref Range    Magnesium 2.6 (H) 1.8 - 2.4 mg/dL   TSH 3RD GENERATION    Collection Time: 01/30/19 10:32 AM   Result Value Ref Range    TSH 1.260 0.358 - 2.912 uIU/mL   METABOLIC PANEL, COMPREHENSIVE    Collection Time: 01/30/19 10:32 AM   Result Value Ref Range    Sodium 134 (L) 136 - 145 mmol/L    Potassium 5.4 (H) 3.5 - 5.1 mmol/L    Chloride 99 98 - 107 mmol/L    CO2 27 21 - 32 mmol/L    Anion gap 8 7 - 16 mmol/L    Glucose 587 (HH) 65 - 100 mg/dL    BUN 52 (H) 8 - 23 MG/DL    Creatinine 1.87 (H) 0.6 - 1.0 MG/DL    GFR est AA 33 (L) >60 ml/min/1.73m2    GFR est non-AA 28 (L) >60 ml/min/1.73m2    Calcium 9.0 8.3 - 10.4 MG/DL    Bilirubin, total 0.2 0.2 - 1.1 MG/DL    ALT (SGPT) 54 12 - 65 U/L    AST (SGOT) 16 15 - 37 U/L    Alk. phosphatase 155 (H) 50 - 136 U/L    Protein, total 7.4 6.3 - 8.2 g/dL    Albumin 3.6 3.2 - 4.6 g/dL    Globulin 3.8 (H) 2.3 - 3.5 g/dL    A-G Ratio 0.9 (L) 1.2 - 3.5     POC LACTIC ACID    Collection Time: 01/30/19 11:03 AM   Result Value Ref Range    Lactic Acid (POC) 0.84 0.5 - 1.9 mmol/L   POC TROPONIN-I    Collection Time: 01/30/19 11:08 AM   Result Value Ref Range    Troponin-I (POC) 0 (L) 0.02 - 0.05 ng/ml   URINE MICROSCOPIC    Collection Time: 01/30/19 12:20 PM   Result Value Ref Range    WBC >100 (H) 0 /hpf    RBC 3-5 0 /hpf    Epithelial cells 0-3 0 /hpf    Bacteria 4+ (H) 0 /hpf    Casts 0-3 0 /lpf   CULTURE, URINE    Collection Time: 01/30/19 12:20 PM   Result Value Ref Range    Special Requests: NO SPECIAL REQUESTS      Culture result:        NO GROWTH AFTER SHORT PERIOD OF INCUBATION. FURTHER RESULTS TO FOLLOW AFTER OVERNIGHT INCUBATION. EKG, 12 LEAD, INITIAL    Collection Time: 01/30/19 12:28 PM   Result Value Ref Range    Ventricular Rate 66 BPM    Atrial Rate 66 BPM    P-R Interval 208 ms    QRS Duration 96 ms    Q-T Interval 436 ms    QTC Calculation (Bezet) 457 ms    Calculated P Axis 59 degrees    Calculated R Axis 43 degrees    Calculated T Axis 84 degrees    Diagnosis       !! AGE AND GENDER SPECIFIC ECG ANALYSIS !!   Normal sinus rhythm  Possible Anterior infarct , age undetermined  Abnormal ECG  No previous ECGs available  Confirmed by SERGEY JUNG (), Elly Grimes ([id number]) on 1/30/2019 2:19:43 PM     GLUCOSE, POC    Collection Time: 01/30/19  2:56 PM   Result Value Ref Range    Glucose (POC) 485 (HH) 65 - 100 mg/dL   GLUCOSE, POC    Collection Time: 01/30/19  5:11 PM   Result Value Ref Range    Glucose (POC) 438 (H) 65 - 100 mg/dL   GLUCOSE, POC    Collection Time: 01/30/19  8:50 PM   Result Value Ref Range    Glucose (POC) 383 (H) 65 - 100 mg/dL GLUCOSE, POC    Collection Time: 01/31/19  4:25 AM   Result Value Ref Range    Glucose (POC) 347 (H) 65 - 591 mg/dL   METABOLIC PANEL, BASIC    Collection Time: 01/31/19  6:00 AM   Result Value Ref Range    Sodium 142 136 - 145 mmol/L    Potassium 4.8 3.5 - 5.1 mmol/L    Chloride 109 (H) 98 - 107 mmol/L    CO2 25 21 - 32 mmol/L    Anion gap 8 7 - 16 mmol/L    Glucose 348 (H) 65 - 100 mg/dL    BUN 38 (H) 8 - 23 MG/DL    Creatinine 1.48 (H) 0.6 - 1.0 MG/DL    GFR est AA 44 (L) >60 ml/min/1.73m2    GFR est non-AA 36 (L) >60 ml/min/1.73m2    Calcium 9.0 8.3 - 10.4 MG/DL   CBC WITH AUTOMATED DIFF    Collection Time: 01/31/19  6:00 AM   Result Value Ref Range    WBC 9.7 4.3 - 11.1 K/uL    RBC 3.63 (L) 4.05 - 5.2 M/uL    HGB 10.8 (L) 11.7 - 15.4 g/dL    HCT 34.2 (L) 35.8 - 46.3 %    MCV 94.2 79.6 - 97.8 FL    MCH 29.8 26.1 - 32.9 PG    MCHC 31.6 31.4 - 35.0 g/dL    RDW 14.6 11.9 - 14.6 %    PLATELET 806 142 - 825 K/uL    MPV 11.9 9.4 - 12.3 FL    ABSOLUTE NRBC 0.00 0.0 - 0.2 K/uL    DF AUTOMATED      NEUTROPHILS 89 (H) 43 - 78 %    LYMPHOCYTES 7 (L) 13 - 44 %    MONOCYTES 3 (L) 4.0 - 12.0 %    EOSINOPHILS 0 (L) 0.5 - 7.8 %    BASOPHILS 0 0.0 - 2.0 %    IMMATURE GRANULOCYTES 1 0.0 - 5.0 %    ABS. NEUTROPHILS 8.6 (H) 1.7 - 8.2 K/UL    ABS. LYMPHOCYTES 0.7 0.5 - 4.6 K/UL    ABS. MONOCYTES 0.3 0.1 - 1.3 K/UL    ABS. EOSINOPHILS 0.0 0.0 - 0.8 K/UL    ABS. BASOPHILS 0.0 0.0 - 0.2 K/UL    ABS. IMM. GRANS. 0.1 0.0 - 0.5 K/UL   GLUCOSE, POC    Collection Time: 01/31/19  7:36 AM   Result Value Ref Range    Glucose (POC) 311 (H) 65 - 100 mg/dL       All Micro Results     Procedure Component Value Units Date/Time    CULTURE, URINE [[id number]] Collected:  01/30/19 1220    Order Status:  Completed Specimen:  Urine from Clean catch Updated:  01/31/19 0819     Special Requests: NO SPECIAL REQUESTS        Culture result:       NO GROWTH AFTER SHORT PERIOD OF INCUBATION. FURTHER RESULTS TO FOLLOW AFTER OVERNIGHT INCUBATION. Current Facility-Administered Medications   Medication Dose Route Frequency    insulin glargine (LANTUS) injection 50 Units  50 Units SubCUTAneous QHS    insulin NPH (NOVOLIN N, HUMULIN N) injection 15 Units  15 Units SubCUTAneous ONCE    sodium chloride (NS) flush 5-40 mL  5-40 mL IntraVENous Q8H    sodium chloride (NS) flush 5-40 mL  5-40 mL IntraVENous PRN    0.9% sodium chloride infusion  100 mL/hr IntraVENous CONTINUOUS    aspirin delayed-release tablet 81 mg  81 mg Oral DAILY    colchicine tablet 0.6 mg  0.6 mg Oral TID PRN    losartan (COZAAR) tablet 100 mg  100 mg Oral DAILY    metoprolol tartrate (LOPRESSOR) tablet 25 mg  25 mg Oral BID    pantoprazole (PROTONIX) tablet 40 mg  40 mg Oral ACB    rOPINIRole (REQUIP) tablet 0.5 mg  0.5 mg Oral QHS PRN    rosuvastatin (CRESTOR) tablet 20 mg  20 mg Oral QHS    traMADol (ULTRAM) tablet 50 mg  50 mg Oral Q8H PRN    sodium chloride (NS) flush 5-40 mL  5-40 mL IntraVENous Q8H    sodium chloride (NS) flush 5-40 mL  5-40 mL IntraVENous PRN    acetaminophen (TYLENOL) tablet 650 mg  650 mg Oral Q4H PRN    HYDROcodone-acetaminophen (NORCO) 5-325 mg per tablet 1 Tab  1 Tab Oral Q4H PRN    naloxone (NARCAN) injection 0.4 mg  0.4 mg IntraVENous PRN    diphenhydrAMINE (BENADRYL) capsule 25 mg  25 mg Oral Q4H PRN    ondansetron (ZOFRAN) injection 4 mg  4 mg IntraVENous Q4H PRN    senna-docusate (PERICOLACE) 8.6-50 mg per tablet 2 Tab  2 Tab Oral DAILY PRN    LORazepam (ATIVAN) tablet 0.5 mg  0.5 mg Oral Q4H PRN    zolpidem (AMBIEN) tablet 5 mg  5 mg Oral QHS PRN    insulin lispro (HUMALOG) injection   SubCUTAneous AC&HS    dextrose 40% (GLUTOSE) oral gel 1 Tube  15 g Oral PRN    glucagon (GLUCAGEN) injection 1 mg  1 mg IntraMUSCular PRN    dextrose (D50W) injection syrg 12.5-25 g  25-50 mL IntraVENous PRN    heparin (porcine) injection 5,000 Units  5,000 Units SubCUTAneous Q8H    tuberculin injection 5 Units  5 Units IntraDERMal ONCE    bisacodyl (DULCOLAX) tablet 5 mg  5 mg Oral DAILY PRN    alum-mag hydroxide-simeth (MYLANTA) oral suspension 30 mL  30 mL Oral Q4H PRN    hydrALAZINE (APRESOLINE) 20 mg/mL injection 20 mg  20 mg IntraVENous Q4H PRN    cefTRIAXone (ROCEPHIN) 1 g in 0.9% sodium chloride (MBP/ADV) 50 mL  1 g IntraVENous Q24H       Other Studies:  Ct Head Wo Cont    Result Date: 1/30/2019  CT HEAD WITHOUT CONTRAST. INDICATION: Altered mental status. COMPARISON: 28 April 2015. TECHNIQUE:   5 mm axial scans from the skull base to the vertex. Our CT scanners use one or more of the following:  Automated exposure control, adjustment of the mA and or kV according to patient size, iterative reconstruction. FINDINGS:  No acute intraparenchymal hemorrhage or abnormal extra-axial fluid collection. The ventricles are normal size. There is white matter low attenuation is present, nonspecific, likely chronic small vessel disease. Old tiny lacunar infarct left thalamus. No midline shift or mass effect. Included portion of the paranasal sinuses and orbits grossly unremarkable. No skull fracture. IMPRESSION:  Negative for acute intracranial abnormality. Chronic changes.         Assessment and Plan:     Hospital Problems as of 1/31/2019 Date Reviewed: 1/30/2019          Codes Class Noted - Resolved POA    Acute renal failure superimposed on stage 3 chronic kidney disease ([facility] ICD-10-CM: N17.9, N18.3  ICD-9-CM: 584.9, 585.3  1/31/2019 - Present Yes        Hyperglycemia due to type 2 diabetes mellitus ([facility] ICD-10-CM: E11.65  ICD-9-CM: 250.00  1/30/2019 - Present Yes        * (Principal) Acute metabolic encephalopathy [id number]: G93.41  ICD-9-CM: 348.31  1/30/2019 - Present Yes        Acute cystitis without hematuria ICD-10-CM: N30.00  ICD-9-CM: 595.0  1/30/2019 - Present Yes        Uncontrolled type 2 diabetes mellitus with hyperglycemia (HCC) (Chronic) ICD-10-CM: E11.65  ICD-9-CM: 250.02  6/15/2015 - Present Yes        Essential hypertension (Chronic) ICD-10-CM: I10  ICD-9-CM: 401.9  6/15/2015 - Present Yes        Chronic kidney disease, stage III (moderate) (HCC) (Chronic) ICD-10-CM: N18.3  ICD-9-CM: 585.3  6/15/2015 - Present Yes              Plan:  UTI with encephalopathy  -mental status about the same  -cont Rocephin  -Send urine culture; wasn't ordered  -still needs IVF; will not be taking PO in her current state. Holding home diuretics    AoCKD  -appears to be nearing baseline    DM2 with hyperglycemia  -increase lantus to 50u tonight. NPH x1 as a bridge to then  -ISS    HTN  -Resume home meds when able/taking PO. PRN hydralazine IV    Discharge planning:  PPD/OT/PT/CM.   Will need STR     Signed:  Claudia Baldwin MD

## 2019-01-31 NOTE — PROGRESS NOTES
CM spoke with pt's dx [facility], [address]. Pt lives alone but has many friends that live nearby. [employer] [facility] also available to assist with any needs. Pt independent with ADLs. PCP and insurance verified. At [place], dx requested go to STR. Per dx request, referral will be sent to [facility] ([facility]) once therapy notes are in. Pt has been there in the past and had a positive experience. CM will follow. Care Management Interventions  PCP Verified by CM:  Yes  Mode of Transport at Discharge: BLS  Transition of Care Consult (CM Consult): Discharge Planning, SNF  Discharge Durable Medical Equipment: No  Physical Therapy Consult: Yes  Occupational Therapy Consult: Yes  Current Support Network: Own Home, Lives Alone  Confirm Follow Up Transport: Family  Plan discussed with Pt/Family/Caregiver: Yes  Freedom of Choice Offered: Yes  Discharge Location  Discharge Placement: Skilled nursing facility

## 2019-02-01 ENCOUNTER — APPOINTMENT (OUTPATIENT)
Dept: MRI IMAGING | Age: 79
DRG: 689 | End: 2019-02-01
Attending: INTERNAL MEDICINE
Payer: MEDICARE

## 2019-02-01 ENCOUNTER — APPOINTMENT (OUTPATIENT)
Dept: INTERVENTIONAL RADIOLOGY/VASCULAR | Age: 79
DRG: 689 | End: 2019-02-01
Attending: INTERNAL MEDICINE
Payer: MEDICARE

## 2019-02-01 ENCOUNTER — APPOINTMENT (OUTPATIENT)
Dept: CT IMAGING | Age: 79
DRG: 689 | End: 2019-02-01
Attending: INTERNAL MEDICINE
Payer: MEDICARE

## 2019-02-01 PROBLEM — R56.9 SEIZURE-LIKE ACTIVITY (HCC): Status: ACTIVE | Noted: 2019-02-01

## 2019-02-01 LAB
ANION GAP SERPL CALC-SCNC: 8 MMOL/L (ref 7–16)
APPEARANCE FLD: CLEAR
BUN SERPL-MCNC: 25 MG/DL (ref 8–23)
CALCIUM SERPL-MCNC: 9.2 MG/DL (ref 8.3–10.4)
CHLORIDE SERPL-SCNC: 114 MMOL/L (ref 98–107)
CO2 SERPL-SCNC: 26 MMOL/L (ref 21–32)
COLOR FLD: COLORLESS
CREAT SERPL-MCNC: 1.35 MG/DL (ref 0.6–1)
CRYPTOC AG CSF QL IA: NEGATIVE
GLUCOSE BLD STRIP.AUTO-MCNC: 112 MG/DL (ref 65–100)
GLUCOSE BLD STRIP.AUTO-MCNC: 115 MG/DL (ref 65–100)
GLUCOSE BLD STRIP.AUTO-MCNC: 139 MG/DL (ref 65–100)
GLUCOSE BLD STRIP.AUTO-MCNC: 242 MG/DL (ref 65–100)
GLUCOSE CSF-MCNC: <1 MG/DL (ref 40–70)
GLUCOSE SERPL-MCNC: 254 MG/DL (ref 65–100)
LYMPHOCYTES NFR FLD: 12 %
MM INDURATION POC: NORMAL MM (ref 0–5)
MONOS+MACROS NFR FLD: 2 %
NEUTROPHILS NFR FLD: 86 %
NUC CELL # FLD: 10 /CU MM
POTASSIUM SERPL-SCNC: 4.2 MMOL/L (ref 3.5–5.1)
PPD POC: NORMAL NEGATIVE
PROT CSF-MCNC: 8 MG/DL (ref 15–45)
RBC # FLD: 575 /CU MM
SODIUM SERPL-SCNC: 148 MMOL/L (ref 136–145)
SPECIMEN SOURCE FLD: NORMAL
TOTAL CELLS COUNTED SPEC: 50
TUBE # CSF: 1
TUBE # CSF: 1

## 2019-02-01 PROCEDURE — 65270000029 HC RM PRIVATE

## 2019-02-01 PROCEDURE — 99223 1ST HOSP IP/OBS HIGH 75: CPT | Performed by: PSYCHIATRY & NEUROLOGY

## 2019-02-01 PROCEDURE — 87327 CRYPTOCOCCUS NEOFORM AG IA: CPT

## 2019-02-01 PROCEDURE — 82962 GLUCOSE BLOOD TEST: CPT

## 2019-02-01 PROCEDURE — 87205 SMEAR GRAM STAIN: CPT

## 2019-02-01 PROCEDURE — 77030020250 HC SOL INJ D 5% LFCR 1000ML BG LF

## 2019-02-01 PROCEDURE — 74011250637 HC RX REV CODE- 250/637: Performed by: INTERNAL MEDICINE

## 2019-02-01 PROCEDURE — 80048 BASIC METABOLIC PNL TOTAL CA: CPT

## 2019-02-01 PROCEDURE — 74011636637 HC RX REV CODE- 636/637: Performed by: INTERNAL MEDICINE

## 2019-02-01 PROCEDURE — 77003 FLUOROGUIDE FOR SPINE INJECT: CPT

## 2019-02-01 PROCEDURE — 74011000258 HC RX REV CODE- 258: Performed by: INTERNAL MEDICINE

## 2019-02-01 PROCEDURE — 82945 GLUCOSE OTHER FLUID: CPT

## 2019-02-01 PROCEDURE — 84157 ASSAY OF PROTEIN OTHER: CPT

## 2019-02-01 PROCEDURE — 74011250636 HC RX REV CODE- 250/636: Performed by: INTERNAL MEDICINE

## 2019-02-01 PROCEDURE — 74011250636 HC RX REV CODE- 250/636: Performed by: FAMILY MEDICINE

## 2019-02-01 PROCEDURE — 77030014143 HC TY PUNC LUMBR BD -A

## 2019-02-01 PROCEDURE — 89050 BODY FLUID CELL COUNT: CPT

## 2019-02-01 PROCEDURE — 36415 COLL VENOUS BLD VENIPUNCTURE: CPT

## 2019-02-01 PROCEDURE — 77030003666 HC NDL SPINAL BD -A

## 2019-02-01 RX ORDER — DEXTROSE MONOHYDRATE 50 MG/ML
100 INJECTION, SOLUTION INTRAVENOUS CONTINUOUS
Status: DISCONTINUED | OUTPATIENT
Start: 2019-02-01 | End: 2019-02-01

## 2019-02-01 RX ORDER — LIDOCAINE HYDROCHLORIDE 20 MG/ML
2-10 INJECTION, SOLUTION EPIDURAL; INFILTRATION; INTRACAUDAL; PERINEURAL ONCE
Status: ACTIVE | OUTPATIENT
Start: 2019-02-01 | End: 2019-02-02

## 2019-02-01 RX ORDER — INSULIN GLARGINE 100 [IU]/ML
60 INJECTION, SOLUTION SUBCUTANEOUS
Status: DISCONTINUED | OUTPATIENT
Start: 2019-02-01 | End: 2019-02-02

## 2019-02-01 RX ORDER — VANCOMYCIN HYDROCHLORIDE
1250 EVERY 24 HOURS
Status: DISCONTINUED | OUTPATIENT
Start: 2019-02-02 | End: 2019-02-02

## 2019-02-01 RX ORDER — DEXTROSE MONOHYDRATE 50 MG/ML
125 INJECTION, SOLUTION INTRAVENOUS CONTINUOUS
Status: DISCONTINUED | OUTPATIENT
Start: 2019-02-01 | End: 2019-02-03

## 2019-02-01 RX ORDER — VANCOMYCIN 2 GRAM/500 ML IN 0.9 % SODIUM CHLORIDE INTRAVENOUS
2000 ONCE
Status: COMPLETED | OUTPATIENT
Start: 2019-02-01 | End: 2019-02-01

## 2019-02-01 RX ORDER — LORAZEPAM 2 MG/ML
2 INJECTION INTRAMUSCULAR
Status: DISCONTINUED | OUTPATIENT
Start: 2019-02-01 | End: 2019-02-06 | Stop reason: HOSPADM

## 2019-02-01 RX ADMIN — HEPARIN SODIUM 5000 UNITS: 5000 INJECTION INTRAVENOUS; SUBCUTANEOUS at 05:32

## 2019-02-01 RX ADMIN — Medication 10 ML: at 13:55

## 2019-02-01 RX ADMIN — HEPARIN SODIUM 5000 UNITS: 5000 INJECTION INTRAVENOUS; SUBCUTANEOUS at 14:00

## 2019-02-01 RX ADMIN — Medication 10 ML: at 05:32

## 2019-02-01 RX ADMIN — ASPIRIN 81 MG: 81 TABLET, COATED ORAL at 09:00

## 2019-02-01 RX ADMIN — LORAZEPAM 1 MG: 2 INJECTION INTRAMUSCULAR; INTRAVENOUS at 05:32

## 2019-02-01 RX ADMIN — Medication 10 ML: at 21:13

## 2019-02-01 RX ADMIN — AMPICILLIN SODIUM 2 G: 2 INJECTION, POWDER, FOR SOLUTION INTRAMUSCULAR; INTRAVENOUS at 16:40

## 2019-02-01 RX ADMIN — CEFTRIAXONE SODIUM 2 G: 2 INJECTION, POWDER, FOR SOLUTION INTRAMUSCULAR; INTRAVENOUS at 17:36

## 2019-02-01 RX ADMIN — AMPICILLIN SODIUM 2 G: 2 INJECTION, POWDER, FOR SOLUTION INTRAMUSCULAR; INTRAVENOUS at 21:11

## 2019-02-01 RX ADMIN — VANCOMYCIN HYDROCHLORIDE 2000 MG: 10 INJECTION, POWDER, LYOPHILIZED, FOR SOLUTION INTRAVENOUS at 18:51

## 2019-02-01 RX ADMIN — PIPERACILLIN SODIUM, TAZOBACTAM SODIUM 3.38 G: 3; .375 INJECTION, POWDER, LYOPHILIZED, FOR SOLUTION INTRAVENOUS at 13:43

## 2019-02-01 RX ADMIN — INSULIN HUMAN 15 UNITS: 100 INJECTION, SUSPENSION SUBCUTANEOUS at 13:25

## 2019-02-01 RX ADMIN — INSULIN GLARGINE 60 UNITS: 100 INJECTION, SOLUTION SUBCUTANEOUS at 21:12

## 2019-02-01 RX ADMIN — ACYCLOVIR SODIUM 640 MG: 50 INJECTION, SOLUTION INTRAVENOUS at 18:22

## 2019-02-01 RX ADMIN — LORAZEPAM 2 MG: 2 INJECTION INTRAMUSCULAR; INTRAVENOUS at 11:55

## 2019-02-01 RX ADMIN — LORAZEPAM 1 MG: 2 INJECTION INTRAMUSCULAR; INTRAVENOUS at 10:38

## 2019-02-01 RX ADMIN — DEXTROSE MONOHYDRATE 100 ML/HR: 5 INJECTION, SOLUTION INTRAVENOUS at 13:21

## 2019-02-01 NOTE — PROGRESS NOTES
Hospitalist Progress Note     Admit Date:  2019 10:03 AM   Name:  Sabine Gilman   Age:  [de-identified] y.o.  :  1940   MRN:  [id number]   PCP:  Lane Mcneal MD  Treatment Team: Attending Provider: Sterling Lopez MD; Utilization Review: Oneyda Lei RN; Primary Nurse: Hallie Cosme; Care Manager: Chaitheo Clifford[address]; Charge Nurse: Breanne Diaz RN; Consulting Provider: Ambrocio Zepeda MD; Speech Language Pathologist: Andria Darby, LOI    Subjective:   Pt is an [de-identified] y/o F with DM, HTN, recurrent UTIs on suppressive macrobid, who presented from home with altered mental status. Was severely altered and could not respond or make eye contact. She has a caretaker who helps her in the morning but otherwise lives alone. Was admitted with UTI and AoCKD. Started on rocephin, IVF   - mental status still unchanged. Will open eyes to voice and touch but mumbles and does not make eye contact. Does not appear to be uncomfortable     - pt still very lethargic. Events noted. Possible seizure activity overnight. low grade fever overnight. I do not appreciate nuchal rigidity on exam.  PERRL. She may have a slight upward babinski on R side.  withdraws from pain      Objective:     Patient Vitals for the past 24 hrs:   Temp Pulse Resp BP SpO2   19 0730 98.5 °F (36.9 °C) 80 20 153/76 99 %   19 0442 98.2 °F (36.8 °C) 88 20 170/79 92 %   19 2347 98.5 °F (36.9 °C) 98 20 159/77 91 %   19 2106 99.4 °F (37.4 °C) -- -- -- --   19 (!) 100.5 °F (38.1 °C) 93 20 153/90 91 %   19 -- -- -- 182/90 --   19 -- (!) 115 -- 116/57 98 %   19 1611 98.7 °F (37.1 °C) 88 18 157/89 97 %   19 1058 96.5 °F (35.8 °C) 92 20 135/67 97 %     Oxygen Therapy  O2 Sat (%): 99 % (19 0730)  O2 Device: Room air (19 1353)    Intake/Output Summary (Last 24 hours) at 2019 0832  Last data filed at 2019 0217  Gross per 24 hour Intake --   Output 500 ml   Net -500 ml       *Note that automatically entered I/Os may not be accurate; dependent on patient compliance with collection and accurate  by assistants. Physical Exam:  General:    Well nourished. lethargic, unresponsive. Eyes:   Normal sclera. PERRL  HENT:  Normocephalic, atraumatic. Moist mucous membranes  CV:   RRR. No m/r/g. Lungs:  CTAB. No wheezing, rhonchi, or rales. Extremities: Warm and dry. No cyanosis or edema. Neurologic: I do not appreciate nuchal rigidity on exam.  She may have a slight upward babinski on R side. withdraws from pain  Skin:     No rashes or jaundice. Normal coloration    I reviewed the labs, imaging, EKGs, telemetry, and other studies done this admission. Data Review:   Recent Results (from the past 24 hour(s))   GLUCOSE, POC    Collection Time: 01/31/19 11:40 AM   Result Value Ref Range    Glucose (POC) 274 (H) 65 - 100 mg/dL   GLUCOSE, POC    Collection Time: 01/31/19  4:09 PM   Result Value Ref Range    Glucose (POC) 165 (H) 65 - 100 mg/dL   GLUCOSE, POC    Collection Time: 01/31/19  7:08 PM   Result Value Ref Range    Glucose (POC) 221 (H) 65 - 100 mg/dL   CBC WITH AUTOMATED DIFF    Collection Time: 01/31/19  7:19 PM   Result Value Ref Range    WBC 11.3 (H) 4.3 - 11.1 K/uL    RBC 3.52 (L) 4.05 - 5.2 M/uL    HGB 10.3 (L) 11.7 - 15.4 g/dL    HCT 33.6 (L) 35.8 - 46.3 %    MCV 95.5 79.6 - 97.8 FL    MCH 29.3 26.1 - 32.9 PG    MCHC 30.7 (L) 31.4 - 35.0 g/dL    RDW 15.1 (H) 11.9 - 14.6 %    PLATELET 971 379 - 584 K/uL    MPV 11.5 9.4 - 12.3 FL    ABSOLUTE NRBC 0.00 0.0 - 0.2 K/uL    DF AUTOMATED      NEUTROPHILS 81 (H) 43 - 78 %    LYMPHOCYTES 13 13 - 44 %    MONOCYTES 5 4.0 - 12.0 %    EOSINOPHILS 0 (L) 0.5 - 7.8 %    BASOPHILS 0 0.0 - 2.0 %    IMMATURE GRANULOCYTES 1 0.0 - 5.0 %    ABS. NEUTROPHILS 9.1 (H) 1.7 - 8.2 K/UL    ABS. LYMPHOCYTES 1.5 0.5 - 4.6 K/UL    ABS. MONOCYTES 0.6 0.1 - 1.3 K/UL    ABS.  EOSINOPHILS 0.0 0.0 - 0.8 K/UL    ABS. BASOPHILS 0.0 0.0 - 0.2 K/UL    ABS. IMM.  GRANS. 0.1 0.0 - 0.5 K/UL   PROLACTIN    Collection Time: 01/31/19  7:19 PM   Result Value Ref Range    Prolactin 5.9 ng/mL   GLUCOSE, POC    Collection Time: 01/31/19  8:27 PM   Result Value Ref Range    Glucose (POC) 290 (H) 65 - 912 mg/dL   METABOLIC PANEL, BASIC    Collection Time: 02/01/19  6:20 AM   Result Value Ref Range    Sodium 148 (H) 136 - 145 mmol/L    Potassium 4.2 3.5 - 5.1 mmol/L    Chloride 114 (H) 98 - 107 mmol/L    CO2 26 21 - 32 mmol/L    Anion gap 8 7 - 16 mmol/L    Glucose 254 (H) 65 - 100 mg/dL    BUN 25 (H) 8 - 23 MG/DL    Creatinine 1.35 (H) 0.6 - 1.0 MG/DL    GFR est AA 49 (L) >60 ml/min/1.73m2    GFR est non-AA 40 (L) >60 ml/min/1.73m2    Calcium 9.2 8.3 - 10.4 MG/DL   GLUCOSE, POC    Collection Time: 02/01/19  7:26 AM   Result Value Ref Range    Glucose (POC) 242 (H) 65 - 100 mg/dL       All Micro Results     Procedure Component Value Units Date/Time    CULTURE, URINE [[id number]]  (Abnormal) Collected:  01/30/19 1220    Order Status:  Completed Specimen:  Urine from Clean catch Updated:  01/31/19 1317     Special Requests: NO SPECIAL REQUESTS        Culture result:       >100,000 COLONIES/mL GRAM NEGATIVE RODS SUBCULTURE IN PROGRESS                Current Facility-Administered Medications   Medication Dose Route Frequency    dextrose 5% 1,000 mL with insulin regular 10 Units infusion   IntraVENous CONTINUOUS    insulin glargine (LANTUS) injection 50 Units  50 Units SubCUTAneous QHS    LORazepam (ATIVAN) injection 1 mg  1 mg IntraVENous Q4H PRN    sodium chloride (NS) flush 5-40 mL  5-40 mL IntraVENous Q8H    sodium chloride (NS) flush 5-40 mL  5-40 mL IntraVENous PRN    aspirin delayed-release tablet 81 mg  81 mg Oral DAILY    colchicine tablet 0.6 mg  0.6 mg Oral TID PRN    losartan (COZAAR) tablet 100 mg  100 mg Oral DAILY    metoprolol tartrate (LOPRESSOR) tablet 25 mg  25 mg Oral BID    pantoprazole (PROTONIX) tablet 40 mg  40 mg Oral ACB    rOPINIRole (REQUIP) tablet 0.5 mg  0.5 mg Oral QHS PRN    rosuvastatin (CRESTOR) tablet 20 mg  20 mg Oral QHS    traMADol (ULTRAM) tablet 50 mg  50 mg Oral Q8H PRN    sodium chloride (NS) flush 5-40 mL  5-40 mL IntraVENous Q8H    sodium chloride (NS) flush 5-40 mL  5-40 mL IntraVENous PRN    acetaminophen (TYLENOL) tablet 650 mg  650 mg Oral Q4H PRN    HYDROcodone-acetaminophen (NORCO) 5-325 mg per tablet 1 Tab  1 Tab Oral Q4H PRN    naloxone (NARCAN) injection 0.4 mg  0.4 mg IntraVENous PRN    diphenhydrAMINE (BENADRYL) capsule 25 mg  25 mg Oral Q4H PRN    ondansetron (ZOFRAN) injection 4 mg  4 mg IntraVENous Q4H PRN    senna-docusate (PERICOLACE) 8.6-50 mg per tablet 2 Tab  2 Tab Oral DAILY PRN    LORazepam (ATIVAN) tablet 0.5 mg  0.5 mg Oral Q4H PRN    zolpidem (AMBIEN) tablet 5 mg  5 mg Oral QHS PRN    insulin lispro (HUMALOG) injection   SubCUTAneous AC&HS    dextrose 40% (GLUTOSE) oral gel 1 Tube  15 g Oral PRN    glucagon (GLUCAGEN) injection 1 mg  1 mg IntraMUSCular PRN    dextrose (D50W) injection syrg 12.5-25 g  25-50 mL IntraVENous PRN    heparin (porcine) injection 5,000 Units  5,000 Units SubCUTAneous Q8H    bisacodyl (DULCOLAX) tablet 5 mg  5 mg Oral DAILY PRN    alum-mag hydroxide-simeth (MYLANTA) oral suspension 30 mL  30 mL Oral Q4H PRN    hydrALAZINE (APRESOLINE) 20 mg/mL injection 20 mg  20 mg IntraVENous Q4H PRN    cefTRIAXone (ROCEPHIN) 1 g in 0.9% sodium chloride (MBP/ADV) 50 mL  1 g IntraVENous Q24H       Other Studies:  No results found.     Assessment and Plan:     Hospital Problems as of 2/1/2019 Date Reviewed: 1/30/2019          Codes Class Noted - Resolved POA    Acute renal failure superimposed on stage 3 chronic kidney disease ([facility] ICD-10-CM: N17.9, N18.3  ICD-9-CM: 584.9, 585.3  1/31/2019 - Present Yes        Hyperglycemia due to type 2 diabetes mellitus ([facility] ICD-10-CM: E11.65  ICD-9-CM: 250.00  1/30/2019 - Present Yes        * (Principal) Acute metabolic encephalopathy [facility]-PJ: G93.41  ICD-9-CM: 348.31  1/30/2019 - Present Yes        Acute cystitis without hematuria ICD-10-CM: N30.00  ICD-9-CM: 595.0  1/30/2019 - Present Yes        Uncontrolled type 2 diabetes mellitus with hyperglycemia (HCC) (Chronic) ICD-10-CM: E11.65  ICD-9-CM: 250.02  6/15/2015 - Present Yes        Essential hypertension (Chronic) ICD-10-CM: I10  ICD-9-CM: 401.9  6/15/2015 - Present Yes        Chronic kidney disease, stage III (moderate) (HCC) (Chronic) ICD-10-CM: N18.3  ICD-9-CM: 585.3  6/15/2015 - Present Yes              Plan:  UTI   -urine cx with GNRs, follow  -WBC elevated which could be due to seizure but given fever last night will switch rocephin to zosyn for now pending culture results    Encephalopathy  -would have expected improvement by now if it was just due to UTI  -probably needs LP, defer to neuro  -check MRI brain with and without contrast stat. If we cannot get this done statim, then check CT head instead  -EEG pending  -appreciate neuro help  -still needs IVF; will not be taking PO in her current state. Possible seizure activity  -see above, neuro consulted, may need LP. MRI/EEG ordered  -will defer loading AED for now to avoid affecting EEG. Defer to neuro  -PRN ativan    HyperNa  -switch NS to D5. Add insulin to help offset hyperglycemia    AoCKD  -appears to be nearing baseline    DM2 with hyperglycemia  -increase lantus to 65u tonight.   -nph 15u x1 this morning  -ISS    HTN  -Resume home meds when able/taking PO.  PRN hydralazine IV    High risk of deterioration    Discharge planning:  CM says pt has SNF bed whenever medically stable and ready    Signed:  Nelson Wagner MD

## 2019-02-01 NOTE — PROCEDURES
Department of Interventional Radiology  [phone]        Interventional Radiology Brief Procedure Note    Patient: Dm Velasco MRN: [id number]  SSN: [id number]    YOB: 1940  Age: [de-identified] y.o. Sex: female      Date of Procedure: 2/1/2019    Pre-Procedure Diagnosis: Encephalopathy. Post-Procedure Diagnosis: SAME    Procedure(s): Lumbar Puncture    Brief Description of Procedure: L5S1. Performed By: Nav Diop MD     Assistants: None    Anesthesia:Lidocaine    Estimated Blood Loss: Less than 10ml    Specimens:  Microbiology    Implants:  None    Findings: OP = 18 cm H2O. Fluid was initially clear, but with continued patient movement, became bloody. Only 4 cc could be obtained due to continue movement. Complications: None    Recommendations: Bedrest x 1 hour. Hold anticoagulation for 24 hours. Follow Up: None.       Signed By: Nav Diop MD     February 1, 2019

## 2019-02-01 NOTE — PROGRESS NOTES
Patient tolerated procedure fair, opening pressure of 18ml/H2O recorded, 1ml CSF sent to lab, Primary RN notified.

## 2019-02-01 NOTE — PROGRESS NOTES
Rapid Response ICU Outreach Note    Rapid response called for seizure like activity noted by primary RN to include screaming and shaking of upper extremities. Upon assessment by Dr. Charline Valiente pt is nonverbal, spontaneously moving upper and lower extremities. Pt is post ictal in presentation. Pt is not able to track or focus, but able to maintain airway with spo2 99% on 2LNC. , sbp 180s, bgl 220s. MD verbal order of prn ativan if this were to happen again. Primary RN, Heriberto, elijah outreach nurse available over night.  Pt to stay in room and be followed by outreach program.

## 2019-02-01 NOTE — PROGRESS NOTES
Attempted MRI twice. Patient climbing out of machine and yelling. Medications did not help. Sent back to room, nurse notified.

## 2019-02-01 NOTE — PROGRESS NOTES
Date of Outreach Update:  Kate Walsh was seen and assessed. MEWS Score: 3 (01/31/19 2347)  Vitals:    01/31/19 1909 01/31/19 2003 01/31/19 2106 01/31/19 2347   BP: 182/90 153/90  159/77   Pulse:  93  98   Resp:  20  20   Temp:  (!) 100.5 °F (38.1 °C) 99.4 °F (37.4 °C) 98.5 °F (36.9 °C)   SpO2:  91%  91%         Pain Assessment  Pain Intensity 1: 0 (01/31/19 1952)        Patient Stated Pain Goal: 0      Previous Outreach assessment has been reviewed. There have been no significant clinical changes since the completion of the last dated Outreach assessment. Will continue to follow up per outreach protocol.     Signed By:   Abida Traylor    February 1, 2019 2:48 AM

## 2019-02-01 NOTE — PROCEDURES
[facility] Neurology   Routine Electroencephalogram Report      DATE:  1 February, 2019; 584-200     EEG Number:  [id number]    Indication:  Altered mental status and possible seizure    Medications:   Current Facility-Administered Medications   Medication Dose Route Frequency Provider Last Rate Last Dose    dextrose 5% infusion  100 mL/hr IntraVENous CONTINUOUS Clint Deras  mL/hr at 02/01/19 1321 100 mL/hr at 02/01/19 1321    insulin glargine (LANTUS) injection 60 Units  60 Units SubCUTAneous QHS Clint Deras MD        LORazepam (ATIVAN) injection 2 mg  2 mg IntraVENous Q4H PRN Clint Deras MD   2 mg at 02/01/19 1155    cefTRIAXone (ROCEPHIN) 2 g in 0.9% sodium chloride (MBP/ADV) 50 mL  2 g IntraVENous Q12H Clint Deras  mL/hr at 02/01/19 1736 2 g at 02/01/19 1736    ampicillin (OMNIPEN) 2 g in 0.9% sodium chloride (MBP/ADV) 100 mL  2 g IntraVENous Q6H Clint Deras  mL/hr at 02/01/19 1640 2 g at 02/01/19 1640    acyclovir (ZOVIRAX) 640 mg in 0.9% sodium chloride 100 mL IVPB  640 mg IntraVENous Q12H Clint Deras MD        lidocaine (PF) (XYLOCAINE) 20 mg/mL (2 %) injection  mg  2-10 mL SubCUTAneous ONCE Rupert Mcgarry MD        vancomycin (VANCOCIN) 2000 mg in  ml infusion  2,000 mg IntraVENous ONCE Clint Deras MD        sodium chloride (NS) flush 5-40 mL  5-40 mL IntraVENous Q8H Karri Rowe MD   10 mL at 02/01/19 1355    sodium chloride (NS) flush 5-40 mL  5-40 mL IntraVENous PRN Francis Pastrana MD        aspirin delayed-release tablet 81 mg  81 mg Oral DAILY Clint Deras MD   81 mg at 02/01/19 0900    colchicine tablet 0.6 mg  0.6 mg Oral TID PRN Clint Deras MD        metoprolol tartrate (LOPRESSOR) tablet 25 mg  25 mg Oral BID Clint Deras MD   Stopped at 01/30/19 2100    pantoprazole (PROTONIX) tablet 40 mg  40 mg Oral ACB Clint Deras MD   Stopped at 01/31/19 0917    rOPINIRole (REQUIP) tablet 0.5 mg  0.5 mg Oral QHS PRN Clint Deras MD        rosuvastatin (CRESTOR) tablet 20 mg  20 mg Oral QHS Clint Deras MD   Stopped at 01/30/19 2200    traMADol (ULTRAM) tablet 50 mg  50 mg Oral Q8H PRN Clint Deras MD        sodium chloride (NS) flush 5-40 mL  5-40 mL IntraVENous Q8H Clint Deras MD   10 mL at 02/01/19 1355    sodium chloride (NS) flush 5-40 mL  5-40 mL IntraVENous PRN Clint Deras MD        acetaminophen (TYLENOL) tablet 650 mg  650 mg Oral Q4H PRN Clint Deras MD        HYDROcodone-acetaminophen [facility]) 5-325 mg per tablet 1 Tab  1 Tab Oral Q4H PRN Clint Deras MD        [facility]) injection 0.4 mg  0.4 mg IntraVENous PRN Clint Deras MD        diphenhydrAMINE (BENADRYL) capsule 25 mg  25 mg Oral Q4H PRN Clint Deras MD        ondansetron [facility]) injection 4 mg  4 mg IntraVENous Q4H PRN Clint Deras MD        senna-docusate (PERICOLACE) 8.6-50 mg per tablet 2 Tab  2 Tab Oral DAILY PRN Clint Deras MD        LORazepam (ATIVAN) tablet 0.5 mg  0.5 mg Oral Q4H PRN Clint Deras MD        zolpidem [facility]) tablet 5 mg  5 mg Oral QHS PRN Clint Deras MD        insulin lispro (HUMALOG) injection   SubCUTAneous AC&HS Clint Deras MD   Stopped at 01/31/19 2200    dextrose 40% (GLUTOSE) oral gel 1 Tube  15 g Oral PRN Clint Deras MD        glucagon [facility]) injection 1 mg  1 mg IntraMUSCular PRN Clint Deras MD        dextrose (D50W) injection syrg 12.5-25 g  25-50 mL IntraVENous PRN Clint Deras MD        bisacodyl (DULCOLAX) tablet 5 mg  5 mg Oral DAILY PRN Clint Deras MD        alum-mag hydroxide-[facility]) oral suspension 30 mL  30 mL Oral Q4H PRN Clint Deras MD        hydrALAZINE (APRESOLINE) 20 mg/mL injection 20 mg  20 mg IntraVENous Q4H PRN Effie Moore, Yobani Stephens MD   20 mg at 01/31/19 5069       Technique: The EEG was recorded on a 32 channel [employer] digital EEG machine. A full electrode headset was applied in accordance with the International 10-20 System of Electrode Placement. All impedances are less than 5000 Ohms. State of Consciousness: awake and drowsy       Description:  The EEG is diffusely slow and poorly organized. Intermittent semirhythmic 5-7 Hz, 40-60 µV slowing is seen bilaterally and symmetrically. No focal slowing or epileptiform activity is seen. Reactivity is fair    Activation Procedures:  Hyperventilation: Was  not performed   Photic Stimulation: Did not alter the record        Interpretation:  This is a moderately abnormal EEG due to the presence of Diffuse slowing and disorganization consistent with a diffuse metabolic disturbance of cerebral function

## 2019-02-01 NOTE — PROGRESS NOTES
Hourly rounds completed during shift. Rapid called at shift change d/t possible seizure activity, see prior notes. Ativan given throughout night. Pt remained confused during night. Pulled out IV in L arm. All needs met, bed locked in low position and call light within reach. Will give report to oncoming RN.

## 2019-02-01 NOTE — PROGRESS NOTES
HOSPITALIST RAPID RESPONSE NOTE    NAME:  Yumiko Lewis   Age:  [de-identified] y.o.  :   1940   MRN:   [id number]  PCP: Dulce Wallace MD  Consulting MD:  Treatment Team: Attending Provider: Crow Jacobs MD; Utilization Review: Dustin Gonsalves RN; Primary Nurse: Zoey Palomino; Care Manager: Dylon Gaitan [facility]; Charge Nurse: Chava Esquivel RN    REASON FOR RAPID RESPONSE CALL: seizure activity    INTERVAL HISTORY:   Yumiko Lewsi is a [de-identified]y.o. year-old female currently hospitalized for UTI with encephalopathy. Per nursing report, she was confused and combating most of the day. However, around 0, RN heard the patient yell loudly, causing staff to go to the room. She was found with her eyes rolled back in her head and shaking from the waist up. She was also frothing at the mouth. This lasted about 5 minutes per nursing. Rapid response was called. By the time I arrived, the patient had stopped convulsing. She is now somnolent but minimally arousable to voice. Artist Chano REVIEW OF SYSTEMS: Comprehensive ROS unable to be performed given pt's confusion    Prior to Admission Medications   Prescriptions Last Dose Informant Patient Reported? Taking? Blood-Glucose Meter (ONETOUCH ULTRAMINI) monitoring kit   No No   Sig: Use as directed   Lancets (ONETOUCH ULTRASOFT LANCETS) misc   No No   Sig: Test 4 times daily as directed   Omeprazole delayed release (PRILOSEC D/R) 20 mg tablet   No No   Sig: Take 1 Tab by mouth daily. aspirin delayed-release 81 mg tablet   Yes No   Sig: Take  by mouth daily. colchicine (COLCRYS) 0.6 mg tablet   Yes No   Sig: Take 0.6 mg by mouth three (3) times daily as needed. furosemide (LASIX) 20 mg tablet   Yes No   Sig: Take  by mouth two (2) times a day.   gabapentin (NEURONTIN) 100 mg capsule   No No   Sig: Take 1 Cap by mouth three (3) times daily.    glucose blood VI test strips (ONETOUCH ULTRA TEST) strip   No No   Sig: Test 4 times daily as directed hydrochlorothiazide (HYDRODIURIL) 25 mg tablet   No No   Sig: Take 1 Tab by mouth daily. insulin glargine (LANTUS SOLOSTAR) 100 unit/mL (3 mL) pen   No No   Si Units by SubCUTAneous route nightly. insulin lispro (HUMALOG KWIKPEN) 100 unit/mL kwikpen   No No   Si Units SubQ with Breakfast; 24 Units SubQ with Lunch; 24 Units SubQ with Dinner. losartan (COZAAR) 100 mg tablet   Yes No   Sig: Take  by mouth daily. metoprolol (LOPRESSOR) 25 mg tablet   No No   Sig: Take 1 Tab by mouth two (2) times a day. nitrofurantoin (MACRODANTIN) 50 mg capsule   No No   Sig: Take 1 Cap by mouth nightly. rOPINIRole (REQUIP) 0.5 mg tablet   No No   Sig: Take 1 Tab by mouth nightly as needed. rosuvastatin (CRESTOR) 20 mg tablet   No No   Sig: Take 1 Tab by mouth nightly. traMADol (ULTRAM) 50 mg tablet   No No   Sig: Take 1 Tab by mouth every eight (8) hours as needed for Pain. Facility-Administered Medications: None       Objective:     Visit Vitals  /90   Pulse (!) 115   Temp 98.7 °F (37.1 °C)   Resp 18   SpO2 98%      Temp (24hrs), Av °F (36.7 °C), Min:96.5 °F (35.8 °C), Max:98.7 °F (37.1 °C)    Oxygen Therapy  O2 Sat (%): 98 % (19 1908)  O2 Device: Room air (19 1353)  Physical Exam:  General:    The patient is a somnolent elderly female in NAD. Head:   Normocephalic/atraumatic. Lungs:   Clear to auscultation bilaterally without wheezes or crackles. No respiratory distress or accessory muscle use. Heart:   Regular rate and rhythm, without murmurs, rubs, or gallops. Abdomen:   Soft, non-tender, non-distended with normoactive bowel sounds. Extremities: Without clubbing, cyanosis, or edema. Skin:     Normal color, texture, and turgor. No rashes, lesions, or jaundice. Pulses: Radial and dorsalis pedis pulses present 2+ bilaterally. Capillary refill <2s. Neurologic: Minimally arousable to voice, pupils dilated to 8 mm bilaterally, sluggishly reactive to light.  Unable to follow commands. Normal muscle tone. Data Review:   Recent Results (from the past 24 hour(s))   GLUCOSE, POC    Collection Time: 01/30/19  8:50 PM   Result Value Ref Range    Glucose (POC) 383 (H) 65 - 100 mg/dL   GLUCOSE, POC    Collection Time: 01/31/19  4:25 AM   Result Value Ref Range    Glucose (POC) 347 (H) 65 - 511 mg/dL   METABOLIC PANEL, BASIC    Collection Time: 01/31/19  6:00 AM   Result Value Ref Range    Sodium 142 136 - 145 mmol/L    Potassium 4.8 3.5 - 5.1 mmol/L    Chloride 109 (H) 98 - 107 mmol/L    CO2 25 21 - 32 mmol/L    Anion gap 8 7 - 16 mmol/L    Glucose 348 (H) 65 - 100 mg/dL    BUN 38 (H) 8 - 23 MG/DL    Creatinine 1.48 (H) 0.6 - 1.0 MG/DL    GFR est AA 44 (L) >60 ml/min/1.73m2    GFR est non-AA 36 (L) >60 ml/min/1.73m2    Calcium 9.0 8.3 - 10.4 MG/DL   CBC WITH AUTOMATED DIFF    Collection Time: 01/31/19  6:00 AM   Result Value Ref Range    WBC 9.7 4.3 - 11.1 K/uL    RBC 3.63 (L) 4.05 - 5.2 M/uL    HGB 10.8 (L) 11.7 - 15.4 g/dL    HCT 34.2 (L) 35.8 - 46.3 %    MCV 94.2 79.6 - 97.8 FL    MCH 29.8 26.1 - 32.9 PG    MCHC 31.6 31.4 - 35.0 g/dL    RDW 14.6 11.9 - 14.6 %    PLATELET 308 421 - 434 K/uL    MPV 11.9 9.4 - 12.3 FL    ABSOLUTE NRBC 0.00 0.0 - 0.2 K/uL    DF AUTOMATED      NEUTROPHILS 89 (H) 43 - 78 %    LYMPHOCYTES 7 (L) 13 - 44 %    MONOCYTES 3 (L) 4.0 - 12.0 %    EOSINOPHILS 0 (L) 0.5 - 7.8 %    BASOPHILS 0 0.0 - 2.0 %    IMMATURE GRANULOCYTES 1 0.0 - 5.0 %    ABS. NEUTROPHILS 8.6 (H) 1.7 - 8.2 K/UL    ABS. LYMPHOCYTES 0.7 0.5 - 4.6 K/UL    ABS. MONOCYTES 0.3 0.1 - 1.3 K/UL    ABS. EOSINOPHILS 0.0 0.0 - 0.8 K/UL    ABS. BASOPHILS 0.0 0.0 - 0.2 K/UL    ABS. IMM.  GRANS. 0.1 0.0 - 0.5 K/UL   GLUCOSE, POC    Collection Time: 01/31/19  7:36 AM   Result Value Ref Range    Glucose (POC) 311 (H) 65 - 100 mg/dL   GLUCOSE, POC    Collection Time: 01/31/19 11:40 AM   Result Value Ref Range    Glucose (POC) 274 (H) 65 - 100 mg/dL   GLUCOSE, POC    Collection Time: 01/31/19  4:09 PM Result Value Ref Range    Glucose (POC) 165 (H) 65 - 100 mg/dL   GLUCOSE, POC    Collection Time: 01/31/19  7:08 PM   Result Value Ref Range    Glucose (POC) 221 (H) 65 - 100 mg/dL       Imaging /Procedures /Studies:  No results found. Assessment and Plan: Active Hospital Problems    Diagnosis Date Noted    Acute renal failure superimposed on stage 3 chronic kidney disease ([facility] [address] 01/31/2019    Hyperglycemia due to type 2 diabetes mellitus ([facility] [address] 01/30/2019    Acute metabolic encephalopathy 82/09/4520    Acute cystitis without hematuria 01/30/2019    Uncontrolled type 2 diabetes mellitus with hyperglycemia ([facility] [address] 06/15/2015    Essential hypertension 06/15/2015    Chronic kidney disease, stage III (moderate) ([facility] [address] 06/15/2015     - Convulsions. Uncertain etiology. Not likely to be epileptic given history of upper body convulsions and scream. Patient does appear to be post-ictal however. STAT prolactin.  EEG/neuro consult in AM. Ativan PRN.     - Disposition: Continue to follow closely on medical floor        Signed By: Tanya Portillo MD     January 31, 2019

## 2019-02-01 NOTE — CONSULTS
Impression    Based on description and clinical probable new onset of seizure in setting of severe encephalopathy. Patient is off toward initially to see her. We will come by and see her in a few minutes. I reviewed the EEG demonstrates severe diffuse slowing and disorganization with intermittent bifrontal sharply contoured rhythmic slowing. Differential diagnosis of sudden onset of encephalopathy with seizure should include potential infectious etiologies. I will review the MRI once images are available. Consideration should be given to spinal fluid examination. Plan:    LP under kaykay Colin    MRI - will require anesthesia support    Begin empiric antibiotic coverage for meningitis with Vancomycin, Ceftriaxone Ampicillin and Acyclovir    CC AMS ? Seizure    History    The patient is a [de-identified]year old female community dwelling female who is generally in good health. Her daughter looks in on her once a day and on the day of admission found her in her home unresponsive and incontinent of stool. The patient was taken the ED where initial evaluation revealed UTI. The patient subsequently had an episode on 1/31 described as \"yell loudly, causing staff to go to the room. She was found with her eyes rolled back in her head and shaking from the waist up. She was also frothing at the mouth\". She has been less responsive since.      Past Medical History:   Diagnosis Date    CAD (coronary artery disease)     DM2 (diabetes mellitus, type 2) (HCC)     Gout     HLD (hyperlipidemia)     HTN (hypertension)     Peripheral neuropathy      Past Surgical History:   Procedure Laterality Date    CARDIAC SURG PROCEDURE UNLIST      stents x 3     HX CHOLECYSTECTOMY      HX CORONARY ARTERY BYPASS GRAFT      x3    HX TUBAL LIGATION         Family History   Problem Relation Age of Onset    Hypertension Mother     Cancer Mother     Diabetes Mother     Heart Disease Mother      Social History     Tobacco Use    Smoking status: Never Smoker    Smokeless tobacco: Never Used   Substance Use Topics    Alcohol use: No    Drug use: No     No current facility-administered medications on file prior to encounter. Current Outpatient Medications on File Prior to Encounter   Medication Sig Dispense Refill    nitrofurantoin (MACRODANTIN) 50 mg capsule Take 1 Cap by mouth nightly. 30 Cap 3    insulin glargine (LANTUS SOLOSTAR) 100 unit/mL (3 mL) pen 34 Units by SubCUTAneous route nightly. 1 Package 5    insulin lispro (HUMALOG KWIKPEN) 100 unit/mL kwikpen 24 Units SubQ with Breakfast; 24 Units SubQ with Lunch; 24 Units SubQ with Dinner. 1 Package 5    hydrochlorothiazide (HYDRODIURIL) 25 mg tablet Take 1 Tab by mouth daily. 90 Tab 1    metoprolol (LOPRESSOR) 25 mg tablet Take 1 Tab by mouth two (2) times a day. 60 Tab 5    Blood-Glucose Meter (ONETOUCH ULTRAMINI) monitoring kit Use as directed 1 Kit 0    glucose blood VI test strips (ONETOUCH ULTRA TEST) strip Test 4 times daily as directed 100 Strip 11    Lancets (ONETOUCH ULTRASOFT LANCETS) misc Test 4 times daily as directed 100 Each 11    losartan (COZAAR) 100 mg tablet Take  by mouth daily.  furosemide (LASIX) 20 mg tablet Take  by mouth two (2) times a day.  Omeprazole delayed release (PRILOSEC D/R) 20 mg tablet Take 1 Tab by mouth daily. 30 Tab 5    rosuvastatin (CRESTOR) 20 mg tablet Take 1 Tab by mouth nightly. 90 Tab 1    gabapentin (NEURONTIN) 100 mg capsule Take 1 Cap by mouth three (3) times daily. 30 Cap 5    colchicine (COLCRYS) 0.6 mg tablet Take 0.6 mg by mouth three (3) times daily as needed.  traMADol (ULTRAM) 50 mg tablet Take 1 Tab by mouth every eight (8) hours as needed for Pain. 90 Tab 5    aspirin delayed-release 81 mg tablet Take  by mouth daily.  rOPINIRole (REQUIP) 0.5 mg tablet Take 1 Tab by mouth nightly as needed.  90 Tab 1       Allergies   Allergen Reactions    Sulfa (Sulfonamide Antibiotics) Swelling     The patient is severely obtunded and cannot give ROS    Visit Vitals  /65 (BP 1 Location: Left arm, BP Patient Position: At rest)   Pulse 71   Temp 97.9 °F (36.6 °C)   Resp 22   Ht 5' 4\" (1.626 m)   Wt 174 lb 2.6 oz (79 kg)   SpO2 95%   BMI 29.90 kg/m²     Obese elderly female poorly responsive    Pupils 3 mm reactive    No nystagmus    Occulocephalics absent    Does not localize pain. Brief arousal. DTRs 1+ symmetrical plantars mute        Recent Results (from the past 12 hour(s))   METABOLIC PANEL, BASIC    Collection Time: 02/02/19  5:14 AM   Result Value Ref Range    Sodium 148 (H) 136 - 145 mmol/L    Potassium 3.5 3.5 - 5.1 mmol/L    Chloride 114 (H) 98 - 107 mmol/L    CO2 26 21 - 32 mmol/L    Anion gap 8 7 - 16 mmol/L    Glucose 71 65 - 100 mg/dL    BUN 22 8 - 23 MG/DL    Creatinine 1.36 (H) 0.6 - 1.0 MG/DL    GFR est AA 48 (L) >60 ml/min/1.73m2    GFR est non-AA 40 (L) >60 ml/min/1.73m2    Calcium 8.2 (L) 8.3 - 10.4 MG/DL   CBC WITH AUTOMATED DIFF    Collection Time: 02/02/19  5:14 AM   Result Value Ref Range    WBC 8.3 4.3 - 11.1 K/uL    RBC 3.46 (L) 4.05 - 5.2 M/uL    HGB 10.3 (L) 11.7 - 15.4 g/dL    HCT 33.8 (L) 35.8 - 46.3 %    MCV 97.7 79.6 - 97.8 FL    MCH 29.8 26.1 - 32.9 PG    MCHC 30.5 (L) 31.4 - 35.0 g/dL    RDW 15.4 (H) 11.9 - 14.6 %    PLATELET 213 632 - 454 K/uL    MPV 11.7 9.4 - 12.3 FL    ABSOLUTE NRBC 0.00 0.0 - 0.2 K/uL    DF AUTOMATED      NEUTROPHILS 70 43 - 78 %    LYMPHOCYTES 18 13 - 44 %    MONOCYTES 10 4.0 - 12.0 %    EOSINOPHILS 1 0.5 - 7.8 %    BASOPHILS 1 0.0 - 2.0 %    IMMATURE GRANULOCYTES 0 0.0 - 5.0 %    ABS. NEUTROPHILS 5.9 1.7 - 8.2 K/UL    ABS. LYMPHOCYTES 1.5 0.5 - 4.6 K/UL    ABS. MONOCYTES 0.8 0.1 - 1.3 K/UL    ABS. EOSINOPHILS 0.1 0.0 - 0.8 K/UL    ABS. BASOPHILS 0.1 0.0 - 0.2 K/UL    ABS. IMM.  GRANS. 0.0 0.0 - 0.5 K/UL   LIPASE    Collection Time: 02/02/19  5:14 AM   Result Value Ref Range    Lipase 366 73 - 393 U/L   GLUCOSE, POC    Collection Time: 02/02/19  7:06 AM Result Value Ref Range    Glucose (POC) 93 65 - 100 mg/dL     Signed By: Nick Curiel MD     February 2, 2019

## 2019-02-01 NOTE — PROGRESS NOTES
TRANSFER - OUT REPORT:    Verbal report given to Primary RN on [place]  being transferred to 6th floor [facility] for routine post - op       Report consisted of patients Situation, Background, Assessment and   Recommendations(SBAR). Information from the following report(s) SBAR, Kardex, Procedure Summary and Intake/Output was reviewed with the receiving nurse. Lines:   Peripheral IV 01/30/19 Right Antecubital (Active)   Site Assessment Clean, dry, & intact 2/1/2019  7:02 AM   Phlebitis Assessment 0 2/1/2019  7:02 AM   Infiltration Assessment 0 2/1/2019  7:02 AM   Dressing Status Clean, dry, & intact 2/1/2019  7:02 AM   Dressing Type Tape;Transparent 2/1/2019  7:02 AM   Hub Color/Line Status Pink 2/1/2019  7:02 AM   Alcohol Cap Used No 2/1/2019  7:02 AM        Opportunity for questions and clarification was provided.       Patient transported with:   O2 @ 3 liters

## 2019-02-01 NOTE — PROGRESS NOTES
2/1/2019  Attempted to see patient for OT this am and pt was off the floor to MRI. Family reports pt is still lethargic and unable to participate. Will attempt when pt is appropriate.    Thank you,  Candice Hendrickson, OT

## 2019-02-01 NOTE — PROGRESS NOTES
Rapid response called for seizure like activity noted by primary RN and oncoming RN. Pt let out a loud yell and then proceed to seizure like activity. Pt eyes rolled upward, pt body begin to shake, tongue protruding outwards with saliva bubbling out the corner of the pt's mouth. Code rapid response called, RN begin to suction pt's mouth. bgl checked. Dr Jhon Lowry arrived to the bedside and assessed pt. New orders received oncoming nurse at bedside.

## 2019-02-01 NOTE — PROGRESS NOTES
Pharmacokinetic Consult to Pharmacist    Kim Eddy is a [de-identified] y.o. female being treated for possible CNS infection with acyclovir, ampicillin, ceftriaxone and vancomycin. Height: 5' 4\" (162.6 cm)  Weight: 79 kg (174 lb 2.6 oz)  Lab Results   Component Value Date/Time    BUN 25 (H) 02/01/2019 06:20 AM    Creatinine 1.35 (H) 02/01/2019 06:20 AM    WBC 11.3 (H) 01/31/2019 07:19 PM    Lactic Acid (POC) 0.84 01/30/2019 11:03 AM      Estimated Creatinine Clearance: 33.8 mL/min (A) (based on SCr of 1.35 mg/dL (H)). Day 1 of vancomycin. Goal trough is 15-20. Dosing started with 2g x 1 and then 1.25g q24h. Will continue to follow patient. Thank you,  Luis Angel Aranda, Pharm. D.   Clinical Pharmacist  [phone]

## 2019-02-01 NOTE — PROGRESS NOTES
Interdisciplinary Rounds completed 02/01/19. Nursing, Case Management, Physician and PT present. Plan of care reviewed and updated. Unable to complete MRI today. Pt sedated.

## 2019-02-01 NOTE — PROGRESS NOTES
SPEECH PATHOLOGY NOTE:    Speech therapy consult received and appreciated. Chart review completed and case discussed with RN. RN holding po intake at this time due to decreased alertness. Also spoke with daughter who was standing outside of room. Per report, patient has not eaten since 1/29. Not currently waking or engaging with daughter per report. Unable to complete assessment at this time as she is undergoing EEG. Speech therapy to follow up at later time today as patient is available and as alertness improves. May want to consider alternative nutrition/hydration/medication if decreased alertness/participation persists. ADDENDUM: Two additional attempts at assessment. Patient off the floor on second attempt. Getting bathed at time of third attempt. RN reports not improvement in mental status from earlier today. Will follow up at later time/date as patient is appropriate to participate and as schedule permits.      Viola Ribe[address] CCC-SLP

## 2019-02-02 ENCOUNTER — ANESTHESIA EVENT (OUTPATIENT)
Dept: SURGERY | Age: 79
DRG: 689 | End: 2019-02-02
Payer: MEDICARE

## 2019-02-02 ENCOUNTER — APPOINTMENT (OUTPATIENT)
Dept: MRI IMAGING | Age: 79
DRG: 689 | End: 2019-02-02
Attending: INTERNAL MEDICINE
Payer: MEDICARE

## 2019-02-02 ENCOUNTER — ANESTHESIA (OUTPATIENT)
Dept: SURGERY | Age: 79
DRG: 689 | End: 2019-02-02
Payer: MEDICARE

## 2019-02-02 LAB
ANION GAP SERPL CALC-SCNC: 8 MMOL/L (ref 7–16)
BASOPHILS # BLD: 0.1 K/UL (ref 0–0.2)
BASOPHILS NFR BLD: 1 % (ref 0–2)
BUN SERPL-MCNC: 22 MG/DL (ref 8–23)
CALCIUM SERPL-MCNC: 8.2 MG/DL (ref 8.3–10.4)
CHLORIDE SERPL-SCNC: 114 MMOL/L (ref 98–107)
CO2 SERPL-SCNC: 26 MMOL/L (ref 21–32)
CREAT SERPL-MCNC: 1.36 MG/DL (ref 0.6–1)
DIFFERENTIAL METHOD BLD: ABNORMAL
EOSINOPHIL # BLD: 0.1 K/UL (ref 0–0.8)
EOSINOPHIL NFR BLD: 1 % (ref 0.5–7.8)
ERYTHROCYTE [DISTWIDTH] IN BLOOD BY AUTOMATED COUNT: 15.4 % (ref 11.9–14.6)
GLUCOSE BLD STRIP.AUTO-MCNC: 128 MG/DL (ref 65–100)
GLUCOSE BLD STRIP.AUTO-MCNC: 141 MG/DL (ref 65–100)
GLUCOSE BLD STRIP.AUTO-MCNC: 316 MG/DL (ref 65–100)
GLUCOSE BLD STRIP.AUTO-MCNC: 93 MG/DL (ref 65–100)
GLUCOSE SERPL-MCNC: 71 MG/DL (ref 65–100)
HCT VFR BLD AUTO: 33.8 % (ref 35.8–46.3)
HGB BLD-MCNC: 10.3 G/DL (ref 11.7–15.4)
IMM GRANULOCYTES # BLD AUTO: 0 K/UL (ref 0–0.5)
IMM GRANULOCYTES NFR BLD AUTO: 0 % (ref 0–5)
LIPASE SERPL-CCNC: 366 U/L (ref 73–393)
LYMPHOCYTES # BLD: 1.5 K/UL (ref 0.5–4.6)
LYMPHOCYTES NFR BLD: 18 % (ref 13–44)
MCH RBC QN AUTO: 29.8 PG (ref 26.1–32.9)
MCHC RBC AUTO-ENTMCNC: 30.5 G/DL (ref 31.4–35)
MCV RBC AUTO: 97.7 FL (ref 79.6–97.8)
MONOCYTES # BLD: 0.8 K/UL (ref 0.1–1.3)
MONOCYTES NFR BLD: 10 % (ref 4–12)
NEUTS SEG # BLD: 5.9 K/UL (ref 1.7–8.2)
NEUTS SEG NFR BLD: 70 % (ref 43–78)
NRBC # BLD: 0 K/UL (ref 0–0.2)
PLATELET # BLD AUTO: 187 K/UL (ref 150–450)
PMV BLD AUTO: 11.7 FL (ref 9.4–12.3)
POTASSIUM SERPL-SCNC: 3.5 MMOL/L (ref 3.5–5.1)
RBC # BLD AUTO: 3.46 M/UL (ref 4.05–5.2)
SODIUM SERPL-SCNC: 148 MMOL/L (ref 136–145)
WBC # BLD AUTO: 8.3 K/UL (ref 4.3–11.1)

## 2019-02-02 PROCEDURE — 74011000250 HC RX REV CODE- 250

## 2019-02-02 PROCEDURE — 74011250637 HC RX REV CODE- 250/637: Performed by: INTERNAL MEDICINE

## 2019-02-02 PROCEDURE — A9575 INJ GADOTERATE MEGLUMI 0.1ML: HCPCS | Performed by: INTERNAL MEDICINE

## 2019-02-02 PROCEDURE — 76210000006 HC OR PH I REC 0.5 TO 1 HR: Performed by: ANESTHESIOLOGY

## 2019-02-02 PROCEDURE — 80048 BASIC METABOLIC PNL TOTAL CA: CPT

## 2019-02-02 PROCEDURE — 82962 GLUCOSE BLOOD TEST: CPT

## 2019-02-02 PROCEDURE — 77030020250 HC SOL INJ D 5% LFCR 1000ML BG LF

## 2019-02-02 PROCEDURE — 76060000033 HC ANESTHESIA 1 TO 1.5 HR: Performed by: ANESTHESIOLOGY

## 2019-02-02 PROCEDURE — 70553 MRI BRAIN STEM W/O & W/DYE: CPT

## 2019-02-02 PROCEDURE — 74011000258 HC RX REV CODE- 258: Performed by: INTERNAL MEDICINE

## 2019-02-02 PROCEDURE — 99233 SBSQ HOSP IP/OBS HIGH 50: CPT | Performed by: PSYCHIATRY & NEUROLOGY

## 2019-02-02 PROCEDURE — 74011636637 HC RX REV CODE- 636/637: Performed by: INTERNAL MEDICINE

## 2019-02-02 PROCEDURE — 83690 ASSAY OF LIPASE: CPT

## 2019-02-02 PROCEDURE — 92610 EVALUATE SWALLOWING FUNCTION: CPT

## 2019-02-02 PROCEDURE — 85025 COMPLETE CBC W/AUTO DIFF WBC: CPT

## 2019-02-02 PROCEDURE — 77030039425 HC BLD LARYNG TRULITE DISP TELE -A: Performed by: ANESTHESIOLOGY

## 2019-02-02 PROCEDURE — 74011250636 HC RX REV CODE- 250/636: Performed by: INTERNAL MEDICINE

## 2019-02-02 PROCEDURE — 74011250636 HC RX REV CODE- 250/636

## 2019-02-02 PROCEDURE — 77030037088 HC TUBE ENDOTRACH ORAL NSL COVD-A: Performed by: ANESTHESIOLOGY

## 2019-02-02 PROCEDURE — 65270000029 HC RM PRIVATE

## 2019-02-02 PROCEDURE — 36415 COLL VENOUS BLD VENIPUNCTURE: CPT

## 2019-02-02 RX ORDER — SODIUM CHLORIDE, SODIUM LACTATE, POTASSIUM CHLORIDE, CALCIUM CHLORIDE 600; 310; 30; 20 MG/100ML; MG/100ML; MG/100ML; MG/100ML
INJECTION, SOLUTION INTRAVENOUS
Status: DISCONTINUED | OUTPATIENT
Start: 2019-02-02 | End: 2019-02-02 | Stop reason: HOSPADM

## 2019-02-02 RX ORDER — GADOTERATE MEGLUMINE 376.9 MG/ML
15 INJECTION INTRAVENOUS
Status: COMPLETED | OUTPATIENT
Start: 2019-02-02 | End: 2019-02-02

## 2019-02-02 RX ORDER — INSULIN GLARGINE 100 [IU]/ML
55 INJECTION, SOLUTION SUBCUTANEOUS
Status: DISCONTINUED | OUTPATIENT
Start: 2019-02-02 | End: 2019-02-03

## 2019-02-02 RX ORDER — LIDOCAINE HYDROCHLORIDE 20 MG/ML
INJECTION, SOLUTION EPIDURAL; INFILTRATION; INTRACAUDAL; PERINEURAL AS NEEDED
Status: DISCONTINUED | OUTPATIENT
Start: 2019-02-02 | End: 2019-02-02 | Stop reason: HOSPADM

## 2019-02-02 RX ORDER — ROCURONIUM BROMIDE 10 MG/ML
INJECTION, SOLUTION INTRAVENOUS AS NEEDED
Status: DISCONTINUED | OUTPATIENT
Start: 2019-02-02 | End: 2019-02-02 | Stop reason: HOSPADM

## 2019-02-02 RX ORDER — POTASSIUM CHLORIDE 14.9 MG/ML
20 INJECTION INTRAVENOUS
Status: DISPENSED | OUTPATIENT
Start: 2019-02-02 | End: 2019-02-02

## 2019-02-02 RX ORDER — SODIUM CHLORIDE 0.9 % (FLUSH) 0.9 %
10 SYRINGE (ML) INJECTION
Status: COMPLETED | OUTPATIENT
Start: 2019-02-02 | End: 2019-02-02

## 2019-02-02 RX ORDER — PROPOFOL 10 MG/ML
INJECTION, EMULSION INTRAVENOUS AS NEEDED
Status: DISCONTINUED | OUTPATIENT
Start: 2019-02-02 | End: 2019-02-02 | Stop reason: HOSPADM

## 2019-02-02 RX ORDER — KETOROLAC TROMETHAMINE 30 MG/ML
INJECTION, SOLUTION INTRAMUSCULAR; INTRAVENOUS AS NEEDED
Status: DISCONTINUED | OUTPATIENT
Start: 2019-02-02 | End: 2019-02-02

## 2019-02-02 RX ORDER — POTASSIUM CHLORIDE 14.9 MG/ML
20 INJECTION INTRAVENOUS ONCE
Status: COMPLETED | OUTPATIENT
Start: 2019-02-02 | End: 2019-02-02

## 2019-02-02 RX ORDER — SUCCINYLCHOLINE CHLORIDE 20 MG/ML
INJECTION INTRAMUSCULAR; INTRAVENOUS AS NEEDED
Status: DISCONTINUED | OUTPATIENT
Start: 2019-02-02 | End: 2019-02-02 | Stop reason: HOSPADM

## 2019-02-02 RX ADMIN — LORAZEPAM 0.5 MG: 0.5 TABLET ORAL at 13:45

## 2019-02-02 RX ADMIN — CEFTRIAXONE SODIUM 2 G: 2 INJECTION, POWDER, FOR SOLUTION INTRAMUSCULAR; INTRAVENOUS at 17:20

## 2019-02-02 RX ADMIN — Medication 10 ML: at 05:43

## 2019-02-02 RX ADMIN — SODIUM CHLORIDE, SODIUM LACTATE, POTASSIUM CHLORIDE, CALCIUM CHLORIDE: 600; 310; 30; 20 INJECTION, SOLUTION INTRAVENOUS at 12:00

## 2019-02-02 RX ADMIN — PROPOFOL 150 MG: 10 INJECTION, EMULSION INTRAVENOUS at 12:05

## 2019-02-02 RX ADMIN — INSULIN LISPRO 8 UNITS: 100 INJECTION, SOLUTION INTRAVENOUS; SUBCUTANEOUS at 22:34

## 2019-02-02 RX ADMIN — POTASSIUM CHLORIDE 20 MEQ: 200 INJECTION, SOLUTION INTRAVENOUS at 08:46

## 2019-02-02 RX ADMIN — ROCURONIUM BROMIDE 20 MG: 10 INJECTION, SOLUTION INTRAVENOUS at 12:38

## 2019-02-02 RX ADMIN — LIDOCAINE HYDROCHLORIDE 60 MG: 20 INJECTION, SOLUTION EPIDURAL; INFILTRATION; INTRACAUDAL; PERINEURAL at 12:05

## 2019-02-02 RX ADMIN — Medication 10 ML: at 12:50

## 2019-02-02 RX ADMIN — CEFTRIAXONE SODIUM 2 G: 2 INJECTION, POWDER, FOR SOLUTION INTRAMUSCULAR; INTRAVENOUS at 03:55

## 2019-02-02 RX ADMIN — INSULIN GLARGINE 55 UNITS: 100 INJECTION, SOLUTION SUBCUTANEOUS at 22:34

## 2019-02-02 RX ADMIN — Medication 10 ML: at 14:43

## 2019-02-02 RX ADMIN — LORAZEPAM 1 MG: 2 INJECTION INTRAMUSCULAR; INTRAVENOUS at 23:33

## 2019-02-02 RX ADMIN — ROSUVASTATIN CALCIUM 20 MG: 20 TABLET, FILM COATED ORAL at 22:33

## 2019-02-02 RX ADMIN — POTASSIUM CHLORIDE 20 MEQ: 200 INJECTION, SOLUTION INTRAVENOUS at 17:20

## 2019-02-02 RX ADMIN — METOPROLOL TARTRATE 25 MG: 25 TABLET ORAL at 22:33

## 2019-02-02 RX ADMIN — ACYCLOVIR SODIUM 640 MG: 50 INJECTION, SOLUTION INTRAVENOUS at 18:32

## 2019-02-02 RX ADMIN — ACYCLOVIR SODIUM 640 MG: 50 INJECTION, SOLUTION INTRAVENOUS at 04:34

## 2019-02-02 RX ADMIN — Medication 10 ML: at 22:34

## 2019-02-02 RX ADMIN — GADOTERATE MEGLUMINE 15 ML: 376.9 INJECTION INTRAVENOUS at 12:50

## 2019-02-02 RX ADMIN — SUCCINYLCHOLINE CHLORIDE 120 MG: 20 INJECTION INTRAMUSCULAR; INTRAVENOUS at 12:05

## 2019-02-02 RX ADMIN — Medication 10 ML: at 14:42

## 2019-02-02 RX ADMIN — AMPICILLIN SODIUM 2 G: 2 INJECTION, POWDER, FOR SOLUTION INTRAMUSCULAR; INTRAVENOUS at 03:55

## 2019-02-02 RX ADMIN — AMPICILLIN SODIUM 2 G: 2 INJECTION, POWDER, FOR SOLUTION INTRAMUSCULAR; INTRAVENOUS at 14:35

## 2019-02-02 NOTE — PROGRESS NOTES
TRANSFER - OUT REPORT:    Verbal report given to Stephen Clarke RN on Dasha Stratton  being transferred to 6th floor for routine progression of care       Report consisted of patients Situation, Background, Assessment and Recommendations(SBAR). Information from the following report(s) SBAR, Procedure Summary and Intake/Output was reviewed with the receiving nurse. Opportunity for questions and clarification was provided.

## 2019-02-02 NOTE — PROGRESS NOTES
Date of Outreach Update:  Kenyatta Vernon was seen and assessed. MEWS Score: 1 (02/02/19 1045)  Vitals:    02/02/19 1347 02/02/19 1353 02/02/19 1357 02/02/19 1445   BP:  149/66 143/73 145/71   Pulse: 72 74 70 73   Resp: 17 20 20 20   Temp:   97.5 °F (36.4 °C) 97.3 °F (36.3 °C)   SpO2: 96%  96% 93%   Weight:       Height:             Pain Assessment  Pain Intensity 1: 0 (02/02/19 1438)        Patient Stated Pain Goal: 0      Previous Outreach assessment has been reviewed. There have been no significant clinical changes since the completion of the last dated Outreach assessment. Will continue to follow up per outreach protocol.     Signed By:   Shon Boothe RN    February 2, 2019 3:16 PM

## 2019-02-02 NOTE — PROGRESS NOTES
Pt remains unable to swallow po medication. Pt answering basic questions but disoriented to commands. Pt falls asleep when medication administration is attempted. Hourly rounds performed through shift, pt denies needs at this time. Bed in low position and call light/ personal items within reach. Will continue to monitor and report to oncoming nurse.

## 2019-02-02 NOTE — ANESTHESIA PREPROCEDURE EVALUATION
Anesthetic History No history of anesthetic complications Review of Systems / Medical History Pertinent labs reviewed Pulmonary Within defined limits Neuro/Psych Within defined limits Comments: Neuropathy Encephalopathy- A & O x 3. Cardiovascular Hypertension CAD, cardiac stents and CABG Exercise tolerance: <4 METS Comments: Essentially normal echo 2017 GI/Hepatic/Renal 
  
 
 
Renal disease: CRI Endo/Other Diabetes: poorly controlled, using insulin Other Findings Physical Exam 
 
Airway Mallampati: II 
TM Distance: 4 - 6 cm Neck ROM: normal range of motion Mouth opening: Normal 
 
 Cardiovascular Regular rate and rhythm,  S1 and S2 normal,  no murmur, click, rub, or gallop Dental 
No notable dental hx Pulmonary Breath sounds clear to auscultation Abdominal 
GI exam deferred Other Findings Anesthetic Plan ASA: 3 Anesthesia type: general 
 
 
 
 
Induction: Intravenous Anesthetic plan and risks discussed with: Patient

## 2019-02-02 NOTE — PROGRESS NOTES
Culture results noted with report of MDR e coli. Unclear significance. Micro showing resistance to all of the medications we have given this hospitalization but patient is clinically improved. I will defer these findings to ID who is following already. Monitor for clinical changes and no abx changes right now.   Contact isolation

## 2019-02-02 NOTE — PROGRESS NOTES
Impression    Based on description and clinical probable new onset of seizure in setting of severe encephalopathyMarkedly improved. Limited CSF examination suggests bacterial meningitis versus herpes encephalitis. Patient will most certainly require repeat lumbar puncture when adequate support (anesthesia, interventional radiology) are available  Plan:    Discussed with Dr. Gal Randle        MRI - will require Sedation  Continue empiric antibiotic coverage for meningitis with Vancomycin, Ceftriaxone Ampicillin and Acyclovir    Infectious diseases consult    CC AMS ? Seizure    History    The patient is a [de-identified]year old female community dwelling female who is generally in good health. Her daughter looks in on her once a day and on the day of admission found her in her home unresponsive and incontinent of stool. The patient was taken the ED where initial evaluation revealed UTI. The patient subsequently had an episode on 1/31 described as \"yell loudly, causing staff to go to the room. She was found with her eyes rolled back in her head and shaking from the waist up. She was also frothing at the mouth\". She has been less responsive since. Interval history:    Markedly improved overnight.   No further seizure-like activity noted      Past Medical History:   Diagnosis Date    CAD (coronary artery disease)     DM2 (diabetes mellitus, type 2) ([facility] [place] [address]     Gout     HLD (hyperlipidemia)     HTN (hypertension)     Peripheral neuropathy      Past Surgical History:   Procedure Laterality Date    CARDIAC SURG PROCEDURE UNLIST      stents x 3     HX CHOLECYSTECTOMY      HX CORONARY ARTERY BYPASS GRAFT      x3    HX TUBAL LIGATION         Family History   Problem Relation Age of Onset    Hypertension Mother     Cancer Mother     Diabetes Mother     Heart Disease Mother      Social History     Tobacco Use    Smoking status: Never Smoker    Smokeless tobacco: Never Used   Substance Use Topics    Alcohol use: No    Drug use: No     No current facility-administered medications on file prior to encounter. Current Outpatient Medications on File Prior to Encounter   Medication Sig Dispense Refill    nitrofurantoin (MACRODANTIN) 50 mg capsule Take 1 Cap by mouth nightly. 30 Cap 3    insulin glargine (LANTUS SOLOSTAR) 100 unit/mL (3 mL) pen 34 Units by SubCUTAneous route nightly. 1 Package 5    insulin lispro (HUMALOG KWIKPEN) 100 unit/mL kwikpen 24 Units SubQ with Breakfast; 24 Units SubQ with Lunch; 24 Units SubQ with Dinner. 1 Package 5    hydrochlorothiazide (HYDRODIURIL) 25 mg tablet Take 1 Tab by mouth daily. 90 Tab 1    metoprolol (LOPRESSOR) 25 mg tablet Take 1 Tab by mouth two (2) times a day. 60 Tab 5    Blood-Glucose Meter (ONETOUCH ULTRAMINI) monitoring kit Use as directed 1 Kit 0    glucose blood VI test strips (ONETOUCH ULTRA TEST) strip Test 4 times daily as directed 100 Strip 11    Lancets (ONETOUCH ULTRASOFT LANCETS) misc Test 4 times daily as directed 100 Each 11    losartan (COZAAR) 100 mg tablet Take  by mouth daily.  furosemide (LASIX) 20 mg tablet Take  by mouth two (2) times a day.  Omeprazole delayed release (PRILOSEC D/R) 20 mg tablet Take 1 Tab by mouth daily. 30 Tab 5    rosuvastatin (CRESTOR) 20 mg tablet Take 1 Tab by mouth nightly. 90 Tab 1    gabapentin (NEURONTIN) 100 mg capsule Take 1 Cap by mouth three (3) times daily. 30 Cap 5    colchicine (COLCRYS) 0.6 mg tablet Take 0.6 mg by mouth three (3) times daily as needed.  traMADol (ULTRAM) 50 mg tablet Take 1 Tab by mouth every eight (8) hours as needed for Pain. 90 Tab 5    aspirin delayed-release 81 mg tablet Take  by mouth daily.  rOPINIRole (REQUIP) 0.5 mg tablet Take 1 Tab by mouth nightly as needed.  90 Tab 1       Allergies   Allergen Reactions    Sulfa (Sulfonamide Antibiotics) Swelling     The patient is severely obtunded and cannot give ROS    Visit Vitals  /65 (BP 1 Location: Left arm, BP Patient Position: At rest)   Pulse 71   Temp 97.9 °F (36.6 °C)   Resp 22   Ht 5' 4\" (1.626 m)   Wt 174 lb 2.6 oz (79 kg)   SpO2 95%   BMI 29.90 kg/m²     General: well nourished, appears stated age    Eyes: no proptosis or exophthalmos; conjunctivae clear, sclerae non-icteric    Chest: clear to auscultation    Cardiac: normal S1 S2; no murmurs gallop or rubs    Neurological:    MSE: alert, oriented times 3; fluent speech; no paraphasic errors; follows commands without difficulty    CN 2: visual fields full; no afferent pupillary defect; VA not checked;   CN 3,4,6: Pupils symmetrical in size, reactive to light directly and consensually; no ptosis; full versions and ductions  CN 5: facial sensation intact to light touch and pin prick. ...   CN 7: Symmetrical facial tone and movements  CN 8 responds to spoken voice  CN 9,10; palate symmetrical gag intact  CN 11: head turn and shoulder shrug intact  CN 12: tongue midline without atrophy or fasiculations    Motor:  Power 5/5 UE and LE proximal to distal  Fine motor movements symmetrical  Tone normal  Atrophy: absent  Gait: Not examined    Cerebellar:  Finger to nose; heel to shin intact  Tandem Not examined    Sensory  Romberg Not performed  Intact to primary modalities in all 4 extremities    Reflexes    Symmetrical and normally active at 2+ in UE and LE  Plantar response flexor bilaterally        Recent Results (from the past 12 hour(s))   METABOLIC PANEL, BASIC    Collection Time: 02/02/19  5:14 AM   Result Value Ref Range    Sodium 148 (H) 136 - 145 mmol/L    Potassium 3.5 3.5 - 5.1 mmol/L    Chloride 114 (H) 98 - 107 mmol/L    CO2 26 21 - 32 mmol/L    Anion gap 8 7 - 16 mmol/L    Glucose 71 65 - 100 mg/dL    BUN 22 8 - 23 MG/DL    Creatinine 1.36 (H) 0.6 - 1.0 MG/DL    GFR est AA 48 (L) >60 ml/min/1.73m2    GFR est non-AA 40 (L) >60 ml/min/1.73m2    Calcium 8.2 (L) 8.3 - 10.4 MG/DL   CBC WITH AUTOMATED DIFF    Collection Time: 02/02/19  5:14 AM   Result Value Ref Range WBC 8.3 4.3 - 11.1 K/uL    RBC 3.46 (L) 4.05 - 5.2 M/uL    HGB 10.3 (L) 11.7 - 15.4 g/dL    HCT 33.8 (L) 35.8 - 46.3 %    MCV 97.7 79.6 - 97.8 FL    MCH 29.8 26.1 - 32.9 PG    MCHC 30.5 (L) 31.4 - 35.0 g/dL    RDW 15.4 (H) 11.9 - 14.6 %    PLATELET 726 001 - 474 K/uL    MPV 11.7 9.4 - 12.3 FL    ABSOLUTE NRBC 0.00 0.0 - 0.2 K/uL    DF AUTOMATED      NEUTROPHILS 70 43 - 78 %    LYMPHOCYTES 18 13 - 44 %    MONOCYTES 10 4.0 - 12.0 %    EOSINOPHILS 1 0.5 - 7.8 %    BASOPHILS 1 0.0 - 2.0 %    IMMATURE GRANULOCYTES 0 0.0 - 5.0 %    ABS. NEUTROPHILS 5.9 1.7 - 8.2 K/UL    ABS. LYMPHOCYTES 1.5 0.5 - 4.6 K/UL    ABS. MONOCYTES 0.8 0.1 - 1.3 K/UL    ABS. EOSINOPHILS 0.1 0.0 - 0.8 K/UL    ABS. BASOPHILS 0.1 0.0 - 0.2 K/UL    ABS. IMM. GRANS. 0.0 0.0 - 0.5 K/UL   LIPASE    Collection Time: 02/02/19  5:14 AM   Result Value Ref Range    Lipase 366 73 - 393 U/L   GLUCOSE, POC    Collection Time: 02/02/19  7:06 AM   Result Value Ref Range    Glucose (POC) 93 65 - 100 mg/dL     Results for Lina Nicholson (MRN [id number]) as of 2/2/2019 11:06   Ref. Range 2/1/2019 16:10   Tube No. Latest Units:   1   Protein,CSF Latest Ref Range: 15 - 45 MG/DL 8 (L)   Tube No. Latest Units:   1   Glucose,CSF Latest Ref Range: 40 - 70 MG/DL <1 (LL)   BODY FLUID TYPE Latest Units:   CEREBROSPINAL FLUID   FLUID APPEARANCE Latest Units:   CLEAR   FLUID COLOR Latest Units:   COLORLESS   FLUID RBC CT.  Latest Units: /cu mm 575   FLUID WBC COUNT Latest Units: /cu mm 10   FLD NEUTROPHILS Latest Units: % 86   FLD LYMPHS Latest Units: % 12   FLD MONO/MACROPHAGES Latest Units: % 2   TOTAL CELLS COUNTED Latest Units:   50     2/2/2019  7:56 AM - [facility]     Specimen Information: Cerebrospinal Fluid        Component Value Ref Range & Units Status   Special Requests: NO SPECIAL REQUESTS    Preliminary   GRAM STAIN 0 TO 2 WBCS SEEN PER OIF   Preliminary   GRAM STAIN NO DEFINITE ORGANISM SEEN    Preliminary   Culture result:    Preliminary   NO GROWTH AFTER SHORT PERIOD OF INCUBATION. FURTHER RESULTS TO FOLLOW AFTER OVERNIGHT INCUBATION.         Signed By: Andrés Bianchi MD     February 2, 2019

## 2019-02-02 NOTE — ANESTHESIA POSTPROCEDURE EVALUATION
Procedure(s): MISC ANESTHESIA. Anesthesia Post Evaluation Patient location during evaluation: PACU Patient participation: complete - patient participated Level of consciousness: awake Pain management: satisfactory to patient Airway patency: patent Anesthetic complications: no 
Cardiovascular status: hemodynamically stable Respiratory status: spontaneous ventilation Hydration status: euvolemic Post anesthesia nausea and vomiting:  none Visit Vitals /79 (BP 1 Location: Left arm, BP Patient Position: At rest) Pulse 72 Temp 36.3 °C (97.3 °F) Resp 24 Ht 5' 4\" (1.626 m) Wt 79 kg (174 lb 2.6 oz) SpO2 100% BMI 29.90 kg/m²  
based on chart review.

## 2019-02-02 NOTE — PROGRESS NOTES
[facility] [address] CRITICAL CARE OUTREACH NURSE PROGRESS REPORT      SUBJECTIVE: Assessed patient secondary to outreach protocol. MEWS Score: 1 (02/02/19 7438)  Vitals:    02/01/19 1931 02/01/19 2327 02/02/19 0418 02/02/19 0707   BP: 143/79 120/76 149/78 116/65   Pulse: 90 65 73 71   Resp: 20 20 20 22   Temp: 97.5 °F (36.4 °C) 97.6 °F (36.4 °C) 98.1 °F (36.7 °C) 97.9 °F (36.6 °C)   SpO2: 100% 97% 95% 95%   Weight:       Height:               LAB DATA:    Recent Labs     02/02/19  0514 02/01/19  0620 01/31/19  0600 01/30/19  1032   * 148* 142 134*   K 3.5 4.2 4.8 5.4*   * 114* 109* 99   CO2 26 26 25 27   AGAP 8 8 8 8   GLU 71 254* 348* 587*   BUN 22 25* 38* 52*   CREA 1.36* 1.35* 1.48* 1.87*   GFRAA 48* 49* 44* 33*   GFRNA 40* 40* 36* 28*   CA 8.2* 9.2 9.0 9.0   MG  --   --   --  2.6*   ALB  --   --   --  3.6   TP  --   --   --  7.4   GLOB  --   --   --  3.8*   AGRAT  --   --   --  0.9*   ALT  --   --   --  54        Recent Labs     02/02/19  0514 01/31/19  1919 01/31/19  0600   WBC 8.3 11.3* 9.7   HGB 10.3* 10.3* 10.8*   HCT 33.8* 33.6* 34.2*    211 193          OBJECTIVE: On arrival to room, I found patient to be resting in bed. Pain Assessment  Pain Intensity 1: 0 (01/31/19 1952)        Patient Stated Pain Goal: 0                                 ASSESSMENT:  Pt resting in bed, no family or visitors at bedside. Drowsy, awakens to voice. Oriented to self only. Difficult to focus for true command following. VSS, On 4L NC, sat 98%. Pt in NAD, no immediate concerns at this time. PLAN:        Will continue to follow up per outreach protocol.     Signed By:   Oscar Altman RN    February 2, 2019 8:34 AM

## 2019-02-02 NOTE — PROGRESS NOTES
Date of Outreach Update:  Kem Cain was seen and assessed. Previous Outreach assessment has been reviewed. There have been no significant clinical changes since the completion of the last dated Outreach assessment. Pt sleeping in bed. Pt on 2.5L NC. O2 sat= 99%. VSS. Will continue to follow up per outreach protocol.     Signed By:   Viridiana German    February 2, 2019 3:50 AM

## 2019-02-02 NOTE — CONSULTS
Infectious Disease Consult    Today's Date: 2/2/2019   Admit Date: 1/30/2019    Impression:   · Altered mental status, improved, s/p LP with 10 WBCs, 86% neutrophils, 12% lymphs 2% monos, and 575 RBCs. Protein 8, glucose <1. 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 · E coli UTI   · Blood glucose 587 on admission; this has improved. Plan:   · Will discontinue vancomycin/ampicillin today, and continue ceftriaxone and acyclovir. · Overall she is improving. Baseline mentation unknown, although she had been living alone with morning caregiver, per the notes. Anti-infectives:   · Vancomycin  · Ceftriaxone  · Ampicillin  · acyclovir    Subjective:   Date of Consultation:  February 2, 2019  Referring Physician: McBurney, Neuro    Patient is a [de-identified] y.o. female with underlying medical history that includes diabetes and frequent UTIs on suppressive macrobid. She was admitted through the ED 1/30/19 with altered mental status and incontinent. Per the notes her family had noticed that she had not taken her medications for about four days. Blood glucose on admission was 587. Clean catch urine culture grew >100K colonies E coli, sensitivities pending; UA with >100 WBCs. Creatinine 1.87 on admission. She received a ceftriaxone 1/30 and 1/31. On 1/31/19 she apparently had a seizure, and was evaluated by Neurology. Tmax 100.5. An LP was done 2/1/19 with cell count showing 10 WBCs, 86% neutrophils, 12% lymphs 2% monos, and 575 RBCs. Protein 8, glucose <1. CSF culture is pending with no organisms seen. She was started on vancomycin, ceftriaxone, ampicillin and acyclovir, and has clinically improved, now afebrile. Brain MRI today shows old R occipital infarct with no acute abnormalities. She is awake and alert, slow to respond and not oriented. Speech evaluation today; she is swallowing without difficulty. Denies nausea, vomiting, headache or stiff neck.     Patient Active Problem List   Diagnosis Code    Coronary artery disease involving coronary bypass graft of native heart without angina pectoris I25.810    Uncontrolled type 2 diabetes mellitus with hyperglycemia ([facility]) E11.65    Essential hypertension I10    HLD (hyperlipidemia) E78.5    Chronic kidney disease, stage III (moderate) ([facility]) N18.3    Osteopenia M85.80    Vitamin D deficiency E55.9    Gastroesophageal reflux disease without esophagitis K21.9    Other allergic rhinitis J30.89    Peripheral neuropathy G62.9    Primary osteoarthritis involving multiple joints M15.0    Insomnia G47.00    RLS (restless legs syndrome) G25.81    Hyperglycemia due to type 2 diabetes mellitus (HCC) E11.65    Acute metabolic encephalopathy G18.51    Acute cystitis without hematuria N30.00    Acute renal failure superimposed on stage 3 chronic kidney disease (HCC) N17.9, N18.3    Seizure-like activity ([facility]) R56.9     Past Medical History:   Diagnosis Date    CAD (coronary artery disease)     DM2 (diabetes mellitus, type 2) ([facility])     Gout     HLD (hyperlipidemia)     HTN (hypertension)     Peripheral neuropathy       Family History   Problem Relation Age of Onset    Hypertension Mother     Cancer Mother     Diabetes Mother     Heart Disease Mother       Social History     Tobacco Use    Smoking status: Never Smoker    Smokeless tobacco: Never Used   Substance Use Topics    Alcohol use: No     Past Surgical History:   Procedure Laterality Date    CARDIAC SURG PROCEDURE UNLIST      stents x 3     HX CHOLECYSTECTOMY      HX CORONARY ARTERY BYPASS GRAFT      x3    HX TUBAL LIGATION        Prior to Admission medications    Medication Sig Start Date End Date Taking? Authorizing Provider   nitrofurantoin (MACRODANTIN) 50 mg capsule Take 1 Cap by mouth nightly. 1/28/16   Cory Delarosa NP   insulin glargine (LANTUS SOLOSTAR) 100 unit/mL (3 mL) pen 34 Units by SubCUTAneous route nightly.  6/15/15   Janice Palacios MD   insulin lispro (HUMALOG KWIKPEN) 100 unit/mL kwikpen 24 Units SubQ with Breakfast; 24 Units SubQ with Lunch; 24 Units SubQ with Dinner. 6/15/15   Yovani Rosales MD   hydrochlorothiazide (HYDRODIURIL) 25 mg tablet Take 1 Tab by mouth daily. 6/15/15   Yovani Rosales MD   metoprolol (LOPRESSOR) 25 mg tablet Take 1 Tab by mouth two (2) times a day. 6/15/15   Yovani Rosales MD   Blood-Glucose Meter [facility] ULTRAMINI) monitoring kit Use as directed 4/14/15   Yovani Rosales MD   glucose blood VI test strips [facility] ULTRA TEST) strip Test 4 times daily as directed 4/14/15   Yovani Rosales MD   Lancets [facility] ULTRASOFT LANCETS) misc Test 4 times daily as directed 4/14/15   Yovani Rosales MD   losartan (COZAAR) 100 mg tablet Take  by mouth daily. 1/12/15   Provider, Historical   furosemide (LASIX) 20 mg tablet Take  by mouth two (2) times a day. Provider, Historical   Omeprazole delayed release (PRILOSEC D/R) 20 mg tablet Take 1 Tab by mouth daily. 3/9/15   Yovani Rosales MD   rosuvastatin (CRESTOR) 20 mg tablet Take 1 Tab by mouth nightly. 3/9/15   Yovani Rosales MD   gabapentin (NEURONTIN) 100 mg capsule Take 1 Cap by mouth three (3) times daily. 3/9/15   Yovani Rosales MD   colchicine (COLCRYS) 0.6 mg tablet Take 0.6 mg by mouth three (3) times daily as needed. Provider, Historical   traMADol (ULTRAM) 50 mg tablet Take 1 Tab by mouth every eight (8) hours as needed for Pain. 6/25/14   Yovani Rosales MD   aspirin delayed-release 81 mg tablet Take  by mouth daily. Provider, Historical   rOPINIRole (REQUIP) 0.5 mg tablet Take 1 Tab by mouth nightly as needed. 4/21/14   Yovani Rosales MD       Allergies   Allergen Reactions    Sulfa (Sulfonamide Antibiotics) Swelling        Review of Systems:  Pertinent items are noted in the History of Present Illness.     Objective:     Visit Vitals  /79 (BP 1 Location: Left arm, BP Patient Position: At rest)   Pulse 72   Temp 97.3 °F (36.3 °C)   Resp 24   Ht 5' 4\" (1.626 m)   Wt 79 kg (174 lb 2.6 oz)   SpO2 100%   BMI 29.90 kg/m²     Temp (24hrs), Av.7 °F (36.5 °C), Min:97.3 °F (36.3 °C), Max:98.1 °F (36.7 °C)       Lines:  Peripheral IV:       Physical Exam:    General:  Alert, cooperative, well nourished, well developed, appears stated age, follows commands   Eyes:  Cloudy, with arcus senilus   Mouth/Throat: Mucous membranes normal, oral pharynx clear   Neck: Supple, no pain with passive ROM   Lungs:   Clear lungs without increased work of breathing   CV:  Regular rate and rhythm, no audible murmur, click, rub or gallop   Abdomen:   Soft, non-tender, bowel sounds active   Extremities: No cyanosis or edema   Skin: Skin color, texture, turgor normal, no rash   Lymph nodes: Cervical and supraclavicular normal   Musculoskeletal: No swelling or deformity   Lines/Devices:  Intact, no erythema, drainage or tenderness   Psych: Alert and cooperative, not oriented to place or time       Data Review:     CBC:  Recent Labs     19  0514 19  1919 19  0600   WBC 8.3 11.3* 9.7   GRANS 70 81* 89*   MONOS 10 5 3*   EOS 1 0* 0*   ANEU 5.9 9.1* 8.6*   ABL 1.5 1.5 0.7   HGB 10.3* 10.3* 10.8*   HCT 33.8* 33.6* 34.2*    211 193       BMP:  Recent Labs     19  0514 19  0620 19  0600   CREA 1.36* 1.35* 1.48*   BUN 22 25* 38*   * 148* 142   K 3.5 4.2 4.8   * 114* 109*   CO2 26 26 25   AGAP 8 8 8   GLU 71 254* 348*       LFTS:  No results for input(s): TBILI, ALT, SGOT, AP, TP, ALB in the last 72 hours.     Microbiology:     All Micro Results     Procedure Component Value Units Date/Time    CULTURE, URINE [[id number]]  (Abnormal) Collected:  19 1220    Order Status:  Completed Specimen:  Urine from Clean catch Updated:  19 1228     Special Requests: NO SPECIAL REQUESTS        Culture result:       >100,000 COLONIES/mL ESCHERICHIA COLI            SENSITIVITY TO FOLLOW       CULTURE, CSF Ayaka Page STAIN [[id number]] Collected:  19 1610    Order Status:  Completed Specimen:  Cerebrospinal Fluid Updated:  02/02/19 0756     Special Requests: NO SPECIAL REQUESTS        GRAM STAIN 0 TO 2 WBCS SEEN PER OIF      NO DEFINITE ORGANISM SEEN        Culture result:       NO GROWTH AFTER SHORT PERIOD OF INCUBATION. FURTHER RESULTS TO FOLLOW AFTER OVERNIGHT INCUBATION. CRYPTOCOCCAL AG, CSF W/REFLEX TITER [[id number]] Collected:  02/01/19 1610    Order Status:  Completed Specimen:  Serum Updated:  02/01/19 1723     Cryptococcus Ag, CSF NEGATIVE        HSV 1 AND 2 BY PCR [[id number]] Collected:  02/01/19 1635    Order Status:  Canceled Specimen:  Other     MENINGITIS PATHOGENS PANEL, CSF (BY PCR) [[id number]] Collected:  02/01/19 1545    Order Status:  Canceled Specimen:  Cerebrospinal Fluid     CSF HOLD [[id number]]     Order Status:  Sent Specimen:  Cerebrospinal Fluid     CRYPTOCOCCUS AG W/REFLEX TITER [[id number]] Collected:  02/01/19 1530    Order Status:  Canceled Specimen:  Serum from Blood           Imaging:   MRI Brain w wo contrast 2/2/19  Radiologist's IMPRESSION: Old right occipital infarct.   No evidence of acute intracranial  abnormality    Signed By: Sydni Ellis NP     February 2, 2019

## 2019-02-02 NOTE — PROGRESS NOTES
Hospitalist Progress Note     Admit Date:  2019 10:03 AM   Name:  Brigid Thibodeaux   Age:  [de-identified] y.o.  :  1940   MRN:  [id number]   PCP:  Segundo Monae MD  Treatment Team: Attending Provider: Vimal Curry MD; Utilization Review: Alise Garcia RN; Primary Nurse: Katy Moy; Care Manager: Mariely Floyd [facility]; Consulting Provider: Ann Clemens MD    Subjective:   Pt is an [de-identified] y/o F with DM, HTN, recurrent UTIs on suppressive macrobid, who presented from home with altered mental status. Was severely altered and could not respond or make eye contact. She has a caretaker who helps her in the morning but otherwise lives alone. Was admitted with UTI and AoCKD. Started on rocephin, IVF. Zosyn was ordered due to failure to improve and recurrent low grade fever. Was extremely lethargic so neuro consulted on . Concern for meningitis so CSF obtained which had glc<1. Pt started on vanc/rocephin/amp/acyclovir after CSF obtained. Was unable to get MRI initially due to pt movement. EEG showed severe encephalopathy. ID consulted for assistance also.  - pt vastly improved mentally from yesterday. Alert and oriented to person, place. Follows basic commands.   Was completely unresponsive yesterday    Objective:     Patient Vitals for the past 24 hrs:   Temp Pulse Resp BP SpO2   19 0707 97.9 °F (36.6 °C) 71 22 116/65 95 %   19 0418 98.1 °F (36.7 °C) 73 20 149/78 95 %   19 2327 97.6 °F (36.4 °C) 65 20 120/76 97 %   19 1931 97.5 °F (36.4 °C) 90 20 143/79 100 %   19 1408 98.1 °F (36.7 °C) 83 20 (!) 144/95 99 %     Oxygen Therapy  O2 Sat (%): 95 % (19 0707)  O2 Device: Room air (19 1353)    Intake/Output Summary (Last 24 hours) at 2019 1030  Last data filed at 2019 0805  Gross per 24 hour   Intake 320 ml   Output 350 ml   Net -30 ml       *Note that automatically entered I/Os may not be accurate; dependent on patient compliance with collection and accurate  by assistants. Physical Exam:  General:    Well nourished. Alert. No distress  Eyes:   Normal sclera. PERRL  HENT:  Normocephalic, atraumatic. Moist mucous membranes  CV:   RRR. No m/r/g. Lungs:  CTAB. No wheezing, rhonchi, or rales. Extremities: Warm and dry. No cyanosis or edema. Skin:     No rashes or jaundice. Normal coloration    I reviewed the labs, imaging, EKGs, telemetry, and other studies done this admission. Data Review:   Recent Results (from the past 24 hour(s))   GLUCOSE, POC    Collection Time: 02/01/19  1:02 PM   Result Value Ref Range    Glucose (POC) 139 (H) 65 - 100 mg/dL   GLUCOSE, POC    Collection Time: 02/01/19  2:59 PM   Result Value Ref Range    Glucose (POC) 115 (H) 65 - 100 mg/dL   CELL COUNT, BODY FLUID    Collection Time: 02/01/19  4:10 PM   Result Value Ref Range    BODY FLUID TYPE CEREBROSPINAL FLUID      FLUID COLOR COLORLESS      FLUID APPEARANCE CLEAR      FLUID RBC CT. 575 /cu mm    FLUID WBC COUNT 10 /cu mm    FLD NEUTROPHILS 86 %    FLD LYMPHS 12 %    FLD MONO/MACROPHAGES 2 %    TOTAL CELLS COUNTED 50     PROTEIN, CSF    Collection Time: 02/01/19  4:10 PM   Result Value Ref Range    Tube No. 1      Protein,CSF 8 (L) 15 - 45 MG/DL   CULTURE, CSF W GRAM STAIN    Collection Time: 02/01/19  4:10 PM   Result Value Ref Range    Special Requests: NO SPECIAL REQUESTS      GRAM STAIN 0 TO 2 WBCS SEEN PER OIF     GRAM STAIN NO DEFINITE ORGANISM SEEN      Culture result:        NO GROWTH AFTER SHORT PERIOD OF INCUBATION. FURTHER RESULTS TO FOLLOW AFTER OVERNIGHT INCUBATION.    GLUCOSE, CSF    Collection Time: 02/01/19  4:10 PM   Result Value Ref Range    Tube No. 1      Glucose,CSF <1 (LL) 40 - 70 MG/DL   CRYPTOCOCCAL AG, CSF W/REFLEX TITER    Collection Time: 02/01/19  4:10 PM   Result Value Ref Range    Cryptococcus Ag, CSF NEGATIVE  NEG     GLUCOSE, POC    Collection Time: 02/01/19  8:24 PM   Result Value Ref Range Glucose (POC) 112 (H) 65 - 587 mg/dL   METABOLIC PANEL, BASIC    Collection Time: 02/02/19  5:14 AM   Result Value Ref Range    Sodium 148 (H) 136 - 145 mmol/L    Potassium 3.5 3.5 - 5.1 mmol/L    Chloride 114 (H) 98 - 107 mmol/L    CO2 26 21 - 32 mmol/L    Anion gap 8 7 - 16 mmol/L    Glucose 71 65 - 100 mg/dL    BUN 22 8 - 23 MG/DL    Creatinine 1.36 (H) 0.6 - 1.0 MG/DL    GFR est AA 48 (L) >60 ml/min/1.73m2    GFR est non-AA 40 (L) >60 ml/min/1.73m2    Calcium 8.2 (L) 8.3 - 10.4 MG/DL   CBC WITH AUTOMATED DIFF    Collection Time: 02/02/19  5:14 AM   Result Value Ref Range    WBC 8.3 4.3 - 11.1 K/uL    RBC 3.46 (L) 4.05 - 5.2 M/uL    HGB 10.3 (L) 11.7 - 15.4 g/dL    HCT 33.8 (L) 35.8 - 46.3 %    MCV 97.7 79.6 - 97.8 FL    MCH 29.8 26.1 - 32.9 PG    MCHC 30.5 (L) 31.4 - 35.0 g/dL    RDW 15.4 (H) 11.9 - 14.6 %    PLATELET 936 804 - 042 K/uL    MPV 11.7 9.4 - 12.3 FL    ABSOLUTE NRBC 0.00 0.0 - 0.2 K/uL    DF AUTOMATED      NEUTROPHILS 70 43 - 78 %    LYMPHOCYTES 18 13 - 44 %    MONOCYTES 10 4.0 - 12.0 %    EOSINOPHILS 1 0.5 - 7.8 %    BASOPHILS 1 0.0 - 2.0 %    IMMATURE GRANULOCYTES 0 0.0 - 5.0 %    ABS. NEUTROPHILS 5.9 1.7 - 8.2 K/UL    ABS. LYMPHOCYTES 1.5 0.5 - 4.6 K/UL    ABS. MONOCYTES 0.8 0.1 - 1.3 K/UL    ABS. EOSINOPHILS 0.1 0.0 - 0.8 K/UL    ABS. BASOPHILS 0.1 0.0 - 0.2 K/UL    ABS. IMM.  GRANS. 0.0 0.0 - 0.5 K/UL   LIPASE    Collection Time: 02/02/19  5:14 AM   Result Value Ref Range    Lipase 366 73 - 393 U/L   GLUCOSE, POC    Collection Time: 02/02/19  7:06 AM   Result Value Ref Range    Glucose (POC) 93 65 - 100 mg/dL       All Micro Results     Procedure Component Value Units Date/Time    CULTURE, CSF Carry Bowie STAIN [[id number]] Collected:  02/01/19 1610    Order Status:  Completed Specimen:  Cerebrospinal Fluid Updated:  02/02/19 2231     Special Requests: NO SPECIAL REQUESTS        GRAM STAIN 0 TO 2 WBCS SEEN PER OIF      NO DEFINITE ORGANISM SEEN        Culture result:       NO GROWTH AFTER SHORT PERIOD OF INCUBATION. FURTHER RESULTS TO FOLLOW AFTER OVERNIGHT INCUBATION.           CRYPTOCOCCAL AG, CSF W/REFLEX TITER [[id number]] Collected:  02/01/19 1610    Order Status:  Completed Specimen:  Serum Updated:  02/01/19 1723     Cryptococcus Ag, CSF NEGATIVE        HSV 1 AND 2 BY PCR [[id number]] Collected:  02/01/19 1635    Order Status:  Canceled Specimen:  Other     MENINGITIS PATHOGENS PANEL, CSF (BY PCR) [[id number]] Collected:  02/01/19 1545    Order Status:  Canceled Specimen:  Cerebrospinal Fluid     CSF HOLD [[id number]]     Order Status:  Sent Specimen:  Cerebrospinal Fluid     CRYPTOCOCCUS AG W/REFLEX TITER [[id number]] Collected:  02/01/19 1530    Order Status:  Canceled Specimen:  Serum from Blood     CULTURE, URINE [[id number]]  (Abnormal) Collected:  01/30/19 1220    Order Status:  Completed Specimen:  Urine from Clean catch Updated:  02/01/19 0859     Special Requests: NO SPECIAL REQUESTS        Culture result:       >100,000 COLONIES/mL GRAM NEGATIVE RODS                  IDENTIFICATION AND SUSCEPTIBILITY TO FOLLOW                Current Facility-Administered Medications   Medication Dose Route Frequency    potassium chloride 20 mEq in 100 ml IVPB  20 mEq IntraVENous Q2H    dextrose 5% infusion  125 mL/hr IntraVENous CONTINUOUS    insulin glargine (LANTUS) injection 60 Units  60 Units SubCUTAneous QHS    LORazepam (ATIVAN) injection 2 mg  2 mg IntraVENous Q4H PRN    cefTRIAXone (ROCEPHIN) 2 g in 0.9% sodium chloride (MBP/ADV) 50 mL  2 g IntraVENous Q12H    ampicillin (OMNIPEN) 2 g in 0.9% sodium chloride (MBP/ADV) 100 mL  2 g IntraVENous Q6H    acyclovir (ZOVIRAX) 640 mg in 0.9% sodium chloride 100 mL IVPB  640 mg IntraVENous Q12H    vancomycin (VANCOCIN) 1250 mg in  ml infusion  1,250 mg IntraVENous Q24H    sodium chloride (NS) flush 5-40 mL  5-40 mL IntraVENous Q8H    sodium chloride (NS) flush 5-40 mL  5-40 mL IntraVENous PRN    aspirin delayed-release tablet 81 mg  81 mg Oral DAILY    colchicine tablet 0.6 mg  0.6 mg Oral TID PRN    metoprolol tartrate (LOPRESSOR) tablet 25 mg  25 mg Oral BID    pantoprazole (PROTONIX) tablet 40 mg  40 mg Oral ACB    rOPINIRole (REQUIP) tablet 0.5 mg  0.5 mg Oral QHS PRN    rosuvastatin (CRESTOR) tablet 20 mg  20 mg Oral QHS    traMADol (ULTRAM) tablet 50 mg  50 mg Oral Q8H PRN    sodium chloride (NS) flush 5-40 mL  5-40 mL IntraVENous Q8H    sodium chloride (NS) flush 5-40 mL  5-40 mL IntraVENous PRN    acetaminophen (TYLENOL) tablet 650 mg  650 mg Oral Q4H PRN    HYDROcodone-acetaminophen (NORCO) 5-325 mg per tablet 1 Tab  1 Tab Oral Q4H PRN    naloxone (NARCAN) injection 0.4 mg  0.4 mg IntraVENous PRN    diphenhydrAMINE (BENADRYL) capsule 25 mg  25 mg Oral Q4H PRN    ondansetron (ZOFRAN) injection 4 mg  4 mg IntraVENous Q4H PRN    senna-docusate (PERICOLACE) 8.6-50 mg per tablet 2 Tab  2 Tab Oral DAILY PRN    LORazepam (ATIVAN) tablet 0.5 mg  0.5 mg Oral Q4H PRN    zolpidem (AMBIEN) tablet 5 mg  5 mg Oral QHS PRN    insulin lispro (HUMALOG) injection   SubCUTAneous AC&HS    dextrose 40% (GLUTOSE) oral gel 1 Tube  15 g Oral PRN    glucagon (GLUCAGEN) injection 1 mg  1 mg IntraMUSCular PRN    dextrose (D50W) injection syrg 12.5-25 g  25-50 mL IntraVENous PRN    bisacodyl (DULCOLAX) tablet 5 mg  5 mg Oral DAILY PRN    alum-mag hydroxide-simeth (MYLANTA) oral suspension 30 mL  30 mL Oral Q4H PRN    hydrALAZINE (APRESOLINE) 20 mg/mL injection 20 mg  20 mg IntraVENous Q4H PRN       Other Studies:  Ir Spinal Puncture Lumbar Diagnostic    Result Date: 2/1/2019  Title: Lumbar puncture. History: Critically ill, encephalopathic, 78-year-old woman. Emergent diagnostic lumbar puncture requested. Consent: Informed written and oral consent was obtained from the patient's daughter after explanation of benefits and risks of the procedure.  Procedure:  Sterile barrier technique (including:  cap, mask, sterile gloves, sterile sheet, hand hygiene, and chlorhexidene for cutaneous antisepsis) were used. With the patient prone, the skin of the back was prepped and draped in the standard sterile fashion. 1% lidocaine was used for local field block. Using fluoroscopy, a 22 gauge spinal needle was advanced into the intrathecal space at the L5-S1 level. Appropriate position was confirmed with clear spinal fluid return. Unfortunately, the patient immediately began moving, turning from side to side, and the flow of CSF return bloody and then stopped. Throughout the procedure, 3 and eventually 4, assistants helped to hold her still and keep her safe. Opening pressure was 18 cm H20. A total of about 2-3 cc was removed for evaluation. The needle was removed and a dressing was applied. Complications:  None. Radiation Exposure Indices: Reference Air Kerma (Ka,r) = 4 mGy Dose Area Product/Kerma Area Product (DAP/KARLOS/PKA) = 54.91 cGy-cm2 Fluoroscopy Exposure Time = 24 seconds Fluorographic Images = 3 Contrast:  0 milliliters. Impression: Uncomplicated lumbar puncture. Plan: The patient will recover at bedrest for 1 hours in her hospital room. Recommend no additional anticoagulation for 24 hours.       Assessment and Plan:     Hospital Problems as of 2/2/2019 Date Reviewed: 1/30/2019          Codes Class Noted - Resolved POA    Seizure-like activity ([facility] ICD-10-CM: R56.9  ICD-9-CM: 780.39  2/1/2019 - Present Yes        Acute renal failure superimposed on stage 3 chronic kidney disease ([facility] ICD-10-CM: N17.9, N18.3  ICD-9-CM: 584.9, 585.3  1/31/2019 - Present Yes        Hyperglycemia due to type 2 diabetes mellitus ([facility] ICD-10-CM: E11.65  ICD-9-CM: 250.00  1/30/2019 - Present Yes        * (Principal) Acute metabolic encephalopathy XKI-59-LA: G93.41  ICD-9-CM: 348.31  1/30/2019 - Present Yes        Acute cystitis without hematuria ICD-10-CM: N30.00  ICD-9-CM: 595.0  1/30/2019 - Present Yes        Uncontrolled type 2 diabetes mellitus with hyperglycemia (HCC) (Chronic) ICD-10-CM: E11.65  ICD-9-CM: 250.02  6/15/2015 - Present Yes        Essential hypertension (Chronic) ICD-10-CM: I10  ICD-9-CM: 401.9  6/15/2015 - Present Yes        Chronic kidney disease, stage III (moderate) (HCC) (Chronic) ICD-10-CM: N18.3  ICD-9-CM: 585.3  6/15/2015 - Present Yes              Plan:  Encephalopathy, possible meningitis  -mental status vastly improved  -will attempt MRI again today given mental status improvement  -on vanc/rocephin/amp/acyclovir started 2/1. S/p rocephin then zosyn 1/30-1/31. -follow CSF and other cultures  -SLP eval for PO intake  -neuro and ID consulted, appreciate help    Possible seizure activity  -monitor for recurrence  -PRN ativan    FEN  -cont D5W for hyperNa  -K has dropped a lot, replace today  -Mg/phos in AM  -SLP to eval PO intake    AoCKD  -appears to be near baseline now    DM2 with hyperglycemia  -decrease lantus to 55u tonight. -ISS    HTN  -holding home HCTZ, losartan, lasix.    PRN hydralazine IV    Discharge planning:  CM says pt has SNF bed whenever she becomes medically stable and ready    Signed:  Leigh Ann Meredith MD

## 2019-02-02 NOTE — PROGRESS NOTES
Problem: Dysphagia (Adult)  Goal: *Acute Goals and Plan of Care (Insert Text)  STG: Pt will demonstrate zero signs/sx of aspiration with mechanical soft textures with thin liquids with no straws with 90% accuracy during all meals. STG: Pt will complete a full speech, language and cognitive evaluation without assistance with 100% accuracy during session. STG: Pt will complete a Modified barium swallow study with zero signs/sx of aspiration  with 100% accuracy during swallow study. LTG: Pt will demonstrate zero signs/sx of aspiration with least restrictive diet with 100% accuracy during all meals. Speech language pathology: bedside swallow note: Initial Assessment    NAME/AGE/GENDER: Yumiko Lewis is a [de-identified] y.o. female  DATE: 2/2/2019  PRIMARY DIAGNOSIS: Hyperglycemia due to type 2 diabetes mellitus ([facility]  Acute metabolic encephalopathy  Procedure(s) (LRB):  MISC ANESTHESIA (N/A) Day of Surgery  ICD-10: Treatment Diagnosis: R13.12 oropharyngeal phase dysphagia  INTERDISCIPLINARY COLLABORATION: Registered Nurse  PRECAUTIONS/ALLERGIES: Sulfa (sulfonamide antibiotics) ASSESSMENT:Based on the objective data described below, Ms. Brambila Session presents with no overt signs/sx of aspiration with thin via cup, pureed and mixed. Increased mastication with chewable textures. Recommend mechanical soft/thin with no straws. Medication as tolerated. Patient will benefit from skilled intervention to address the below impairments. ?????? ? ? This section established at most recent assessment??????????  PROBLEM LIST (Impairments causing functional limitations):  1. dysphagia  REHABILITATION POTENTIAL FOR STATED GOALS: Good  PLAN OF CARE:   Patient will benefit from skilled intervention to address the following impairments.   RECOMMENDATIONS AND PLANNED INTERVENTIONS (Benefits and precautions of therapy have been discussed with the patient.):  · PO:  Mechanical soft  · Liquids:  regular thin  MEDICATIONS:  · With liquid  · Whole in puree  ASPIRATION PRECAUTIONS:  · Slow rate of intake  · Small bites/sips  · Upright at 90 degrees during meal  COMPENSATORY STRATEGIES/MODIFICATIONS INCLUDING:  · Cup/sip  OTHER RECOMMENDATIONS (including follow up treatment recommendations): · Family training/education  · Patient education  RECOMMENDED DIET MODIFICATIONS DISCUSSED WITH:  · Nursing  · Family  · Patient  FREQUENCY/DURATION: Continue to follow patient 3 times a week for duration of hospital stay to address above goals. RECOMMENDED REHABILITATION/EQUIPMENT: (at time of discharge pending progress): Due to the probability of continued deficits (see above) this patient will likely need continued skilled speech therapy after discharge. SUBJECTIVE:   alert  History of Present Injury/Illness: Ms. Christiane Eddy  has a past medical history of CAD (coronary artery disease), DM2 (diabetes mellitus, type 2) ([address] Gout, HLD (hyperlipidemia), HTN (hypertension), and Peripheral neuropathy. She also  has a past surgical history that includes pr cardiac surg procedure unlist; hx cholecystectomy; hx tubal ligation; and hx coronary artery bypass graft. Present Symptoms:    Pain Scale 1: Visual  Pain Intensity 1: 0  Current Medications:   No current facility-administered medications on file prior to encounter. Current Outpatient Medications on File Prior to Encounter   Medication Sig Dispense Refill    nitrofurantoin (MACRODANTIN) 50 mg capsule Take 1 Cap by mouth nightly. 30 Cap 3    insulin glargine (LANTUS SOLOSTAR) 100 unit/mL (3 mL) pen 34 Units by SubCUTAneous route nightly. 1 Package 5    insulin lispro (HUMALOG KWIKPEN) 100 unit/mL kwikpen 24 Units SubQ with Breakfast; 24 Units SubQ with Lunch; 24 Units SubQ with Dinner. 1 Package 5    hydrochlorothiazide (HYDRODIURIL) 25 mg tablet Take 1 Tab by mouth daily. 90 Tab 1    metoprolol (LOPRESSOR) 25 mg tablet Take 1 Tab by mouth two (2) times a day.  60 Tab 5    Blood-Glucose Meter (ONETOUCH ULTRAMINI) monitoring kit Use as directed 1 Kit 0    glucose blood VI test strips (ONETOUCH ULTRA TEST) strip Test 4 times daily as directed 100 Strip 11    Lancets (ONETOUCH ULTRASOFT LANCETS) misc Test 4 times daily as directed 100 Each 11    losartan (COZAAR) 100 mg tablet Take  by mouth daily.  furosemide (LASIX) 20 mg tablet Take  by mouth two (2) times a day.  Omeprazole delayed release (PRILOSEC D/R) 20 mg tablet Take 1 Tab by mouth daily. 30 Tab 5    rosuvastatin (CRESTOR) 20 mg tablet Take 1 Tab by mouth nightly. 90 Tab 1    gabapentin (NEURONTIN) 100 mg capsule Take 1 Cap by mouth three (3) times daily. 30 Cap 5    colchicine (COLCRYS) 0.6 mg tablet Take 0.6 mg by mouth three (3) times daily as needed.  traMADol (ULTRAM) 50 mg tablet Take 1 Tab by mouth every eight (8) hours as needed for Pain. 90 Tab 5    aspirin delayed-release 81 mg tablet Take  by mouth daily.  rOPINIRole (REQUIP) 0.5 mg tablet Take 1 Tab by mouth nightly as needed. 90 Tab 1     Current Dietary Status:    NPO  Social History/Home Situation:   Home Environment: Apartment  One/Two Story Residence: One story  Living Alone: Yes  Support Systems: Friends \ neighbors, Child(marga)  Patient Expects to be Discharged to[de-identified] Rehabilitation facility  Current DME Used/Available at Home: Onita Bonds, rolling, Cane, straight, Wheelchair  OBJECTIVE:   Respiratory Status:  Room air     Oral Motor Structure/Speech:  Oral-Motor Structure/Motor Speech  Labial: Decreased rate  Oral Hygiene: dry  Lingual: Decreased rate    Cognitive and Communication Status:  Neurologic State: Alert  Orientation Level: Oriented to person  Cognition: Decreased command following  Perception: Appears intact  Perseveration: No perseveration noted  Safety/Judgement: Fall prevention    BEDSIDE SWALLOW EVALUATION  Oral Assessment:  Oral Assessment  Labial: Decreased rate  Oral Hygiene: dry  Lingual: Decreased rate  P.O.  Trials:  Patient Position: upright in bed    The patient was given teaspoon amounts of the following:   Consistency Presented: Thin liquid;Mixed consistency;Puree  How Presented: SLP-fed/presented;Self-fed/presented;Cup/sip;Spoon    ORAL PHASE:  Bolus Acceptance: No impairment  Bolus Formation/Control: Impaired  Propulsion: Delayed (# of seconds)  Type of Impairment: Mastication  Oral Residue: Less than 10% of bolus    PHARYNGEAL PHASE:  Initiation of Swallow: Delayed (# of seconds)     Aspiration Signs/Symptoms: None  Vocal Quality: Strain;Phonation breaks; Hoarse           Pharyngeal Phase Characteristics: Poor endurance; Suspected pharyngeal residue    OTHER OBSERVATIONS:  Rate/bite size: Questionable   Endurance:  Impaired   Comments: Tool Used: Dysphagia Outcome and Severity Scale (ELKE)    Score Comments   Normal Diet  [] 7 With no strategies or extra time needed   Functional Swallow  [] 6 May have mild oral or pharyngeal delay       Mild Dysphagia    [] 5 Which may require one diet consistency restricted (those who demonstrate penetration which is entirely cleared on MBS would be included)   Mild-Moderate Dysphagia  [x] 4 With 1-2 diet consistencies restricted       Moderate Dysphagia  [] 3 With 2 or more diet consistencies restricted       Moderately Severe Dysphagia  [] 2 With partial PO strategies (trials with ST only)       Severe Dysphagia  [] 1 With inability to tolerate any PO safely          Score:  Initial: 4 Most Recent: X (Date: --)   Interpretation of Tool: The Dysphagia Outcome and Severity Scale (ELKE) is a simple, easy-to-use, 7-point scale developed to systematically rate the functional severity of dysphagia based on objective assessment and make recommendations for diet level, independence level, and type of nutrition.        Payor: [facility] MEDICARE / Plan: [address] / Product Type: Managed Care Medicare /     TREATMENT:    (In addition to Assessment/Re-Assessment sessions the following treatments were rendered)  Assessment/Reassessment only, no treatment provided today  MODALITIES:         ORAL MOTOR  EXERCISES:        LARYNGEAL / PHARYNGEAL EXERCISES:        __________________________________________________________________________________________________  Safety:   After treatment position/precautions:  · Call light within reach  · Nurse at bedside  · Upright in Bed  Treatment Assessment:   Progression/Medical Necessity:   · Skilled intervention continues to be required due to patient still consuming a modified diet. Compliance with Program/Exercises: Will assess as treatment progresses  Reason for Continuation of Services/Other Comments:  · Patient continues to require skilled intervention due to dysphagia. Recommendations/Intent for next treatment session: \"Treatment next visit will focus on diet tolerance\".     Total Treatment Duration:  Time In: 5150  Time Out: [address]/HARPRE[facility]/SLP

## 2019-02-02 NOTE — PROGRESS NOTES
Mickey Bojorquez CRITICAL CARE OUTREACH NURSE PROGRESS REPORT      SUBJECTIVE: Called to assess patient secondary to outreach protocol. MEWS Score: 3 (01/31/19 2347)  Vitals:    02/01/19 0730 02/01/19 0851 02/01/19 1408 02/01/19 1931   BP: 153/76  (!) 144/95 143/79   Pulse: 80  83 90   Resp: 20  20 20   Temp: 98.5 °F (36.9 °C)  98.1 °F (36.7 °C) 97.5 °F (36.4 °C)   SpO2: 99%  99% 100%   Weight:  79 kg (174 lb 2.6 oz)     Height:  5' 4\" (1.626 m)          LAB DATA:    Recent Labs     02/01/19  0620 01/31/19  0600 01/30/19  1032   * 142 134*   K 4.2 4.8 5.4*   * 109* 99   CO2 26 25 27   AGAP 8 8 8   * 348* 587*   BUN 25* 38* 52*   CREA 1.35* 1.48* 1.87*   GFRAA 49* 44* 33*   GFRNA 40* 36* 28*   CA 9.2 9.0 9.0   MG  --   --  2.6*   ALB  --   --  3.6   TP  --   --  7.4   GLOB  --   --  3.8*   AGRAT  --   --  0.9*   ALT  --   --  54        Recent Labs     01/31/19  1919 01/31/19  0600 01/30/19  1032   WBC 11.3* 9.7 6.4   HGB 10.3* 10.8* 10.9*   HCT 33.6* 34.2* 34.0*    193 194          OBJECTIVE: On arrival to room, I found patient to be resting in bed post activity (bath). Pain Assessment  Pain Intensity 1: 0 (01/31/19 1952)        Patient Stated Pain Goal: 0                                 ASSESSMENT:  Pt is drowsy. Pt awakes to voice. Pt is alert to person and birth year. Pt is on 3L NC and o2 sat= 98%. Family at bedside. Family expressed gratitude for hospital staff and care. PLAN:  Will continue to monitor per protocol.

## 2019-02-02 NOTE — PROGRESS NOTES
Problem: Falls - Risk of  Goal: *Absence of Falls  Document Hood Fall Risk and appropriate interventions in the flowsheet. Outcome: Progressing Towards Goal  Fall Risk Interventions:       Mentation Interventions: Adequate sleep, hydration, pain control, Bed/chair exit alarm, Increase mobility, More frequent rounding, Reorient patient    Medication Interventions: Evaluate medications/consider consulting pharmacy    Elimination Interventions: Bed/chair exit alarm, Call light in reach, Patient to call for help with toileting needs, Toileting schedule/hourly rounds             Problem: Pressure Injury - Risk of  Goal: *Prevention of pressure injury  Document Ashu Scale and appropriate interventions in the flowsheet.   Outcome: Progressing Towards Goal  Pressure Injury Interventions:  Sensory Interventions: Assess changes in LOC, Assess need for specialty bed, Keep linens dry and wrinkle-free, Minimize linen layers    Moisture Interventions: Absorbent underpads, Apply protective barrier, creams and emollients, Internal/External urinary devices, Minimize layers    Activity Interventions: Assess need for specialty bed, Increase time out of bed, Pressure redistribution bed/mattress(bed type), PT/OT evaluation    Mobility Interventions: Assess need for specialty bed, Pressure redistribution bed/mattress (bed type), PT/OT evaluation    Nutrition Interventions: Document food/fluid/supplement intake, Offer support with meals,snacks and hydration    Friction and Shear Interventions: Apply protective barrier, creams and emollients, Foam dressings/transparent film/skin sealants, Minimize layers

## 2019-02-02 NOTE — H&P
Patient: Luisito Cruz MRN: [id number]  SSN: [id number]    YOB: 1940  Age: [de-identified] y.o. Sex: female      History and Physical    Luisito Cruz is a [de-identified] y.o. female having Procedure(s): MISC ANESTHESIA. Allergies: Allergies   Allergen Reactions    Sulfa (Sulfonamide Antibiotics) Swelling        Chief Complaint: encephalopathy. History of Present Illness: encephalopathy. Past Medical History:   Diagnosis Date    CAD (coronary artery disease)     DM2 (diabetes mellitus, type 2) (HCC)     Gout     HLD (hyperlipidemia)     HTN (hypertension)     Peripheral neuropathy       Past Surgical History:   Procedure Laterality Date    CARDIAC SURG PROCEDURE UNLIST      stents x 3     HX CHOLECYSTECTOMY      HX CORONARY ARTERY BYPASS GRAFT      x3    HX TUBAL LIGATION        Family History   Problem Relation Age of Onset    Hypertension Mother     Cancer Mother     Diabetes Mother     Heart Disease Mother       Social History     Tobacco Use    Smoking status: Never Smoker    Smokeless tobacco: Never Used   Substance Use Topics    Alcohol use: No        Prior to Admission medications    Medication Sig Start Date End Date Taking? Authorizing Provider   nitrofurantoin (MACRODANTIN) 50 mg capsule Take 1 Cap by mouth nightly. 1/28/16   Zoe Parrish NP   insulin glargine (LANTUS SOLOSTAR) 100 unit/mL (3 mL) pen 34 Units by SubCUTAneous route nightly. 6/15/15   Henry Witt MD   insulin lispro (HUMALOG KWIKPEN) 100 unit/mL kwikpen 24 Units SubQ with Breakfast; 24 Units SubQ with Lunch; 24 Units SubQ with Dinner. 6/15/15   Henry Witt MD   hydrochlorothiazide (HYDRODIURIL) 25 mg tablet Take 1 Tab by mouth daily. 6/15/15   Henry Witt MD   metoprolol (LOPRESSOR) 25 mg tablet Take 1 Tab by mouth two (2) times a day.  6/15/15   Henry Witt MD   Blood-Glucose Meter [facility] ULTRAMINI) monitoring kit Use as directed 4/14/15   Henry Witt MD   glucose blood VI test strips [facility] ULTRA TEST) strip Test 4 times daily as directed 4/14/15   Liliam Carlos MD   Lancets [facility] ULTRASOFT LANCETS) misc Test 4 times daily as directed 4/14/15   Liliam Carlos MD   losartan (COZAAR) 100 mg tablet Take  by mouth daily. 1/12/15   Provider, Historical   furosemide (LASIX) 20 mg tablet Take  by mouth two (2) times a day. Provider, Historical   Omeprazole delayed release (PRILOSEC D/R) 20 mg tablet Take 1 Tab by mouth daily. 3/9/15   Liliam Carlos MD   rosuvastatin (CRESTOR) 20 mg tablet Take 1 Tab by mouth nightly. 3/9/15   Liliam Carlos MD   gabapentin (NEURONTIN) 100 mg capsule Take 1 Cap by mouth three (3) times daily. 3/9/15   Liliam Carlos MD   colchicine (COLCRYS) 0.6 mg tablet Take 0.6 mg by mouth three (3) times daily as needed. Provider, Historical   traMADol (ULTRAM) 50 mg tablet Take 1 Tab by mouth every eight (8) hours as needed for Pain. 6/25/14   Liliam Carlos MD   aspirin delayed-release 81 mg tablet Take  by mouth daily. Provider, Historical   rOPINIRole (REQUIP) 0.5 mg tablet Take 1 Tab by mouth nightly as needed. 4/21/14   Liliam Carlos MD        Visit Vitals  /69 (BP 1 Location: Left arm, BP Patient Position: At rest)   Pulse 63   Temp 36.3 °C (97.4 °F)   Resp 20   Ht 5' 4\" (1.626 m)   Wt 79 kg (174 lb 2.6 oz)   SpO2 90%   BMI 29.90 kg/m²        Assessment and Plan:   Edith Mora is a [de-identified] y.o. female having Procedure(s): [address] [facility] ANESTHESIA for encephalopathy. Preanesthesia Evaluation     Last edited 02/02/19 1124 by Abbi Haynes MD  Date of Service 02/02/19 1123             Anesthetic History   No history of anesthetic complications            Review of Systems / Medical History  Pertinent labs reviewed    Pulmonary  Within defined limits                 Neuro/Psych   Within defined limits          Comments: Neuropathy  Encephalopathy- A & O x 3.  Cardiovascular    Hypertension          CAD, cardiac stents and CABG    Exercise tolerance: <4 METS  Comments: Essentially normal echo 2017     GI/Hepatic/Renal         Renal disease: CRI       Endo/Other    Diabetes: poorly controlled, using insulin         Other Findings              Physical Exam    Airway  Mallampati: II  TM Distance: 4 - 6 cm  Neck ROM: normal range of motion   Mouth opening: Normal     Cardiovascular  Regular rate and rhythm,  S1 and S2 normal,  no murmur, click, rub, or gallop             Dental  No notable dental hx       Pulmonary  Breath sounds clear to auscultation               Abdominal  GI exam deferred       Other Findings            Anesthetic Plan    ASA: 3  Anesthesia type: general          Induction: Intravenous  Anesthetic plan and risks discussed with: Patient               Preanesthesia evaluation performed by Melanie Sanchez MD            Signed By: Melany Mccoy MD     February 2, 2019

## 2019-02-03 PROBLEM — G03.9 MENINGITIS: Status: ACTIVE | Noted: 2019-02-03

## 2019-02-03 PROBLEM — N17.9 ACUTE RENAL FAILURE SUPERIMPOSED ON STAGE 3 CHRONIC KIDNEY DISEASE (HCC): Status: RESOLVED | Noted: 2019-01-31 | Resolved: 2019-02-03

## 2019-02-03 PROBLEM — N18.30 ACUTE RENAL FAILURE SUPERIMPOSED ON STAGE 3 CHRONIC KIDNEY DISEASE (HCC): Status: RESOLVED | Noted: 2019-01-31 | Resolved: 2019-02-03

## 2019-02-03 LAB
ALBUMIN SERPL-MCNC: 2.5 G/DL (ref 3.2–4.6)
ALBUMIN/GLOB SERPL: 0.7 {RATIO} (ref 1.2–3.5)
ALP SERPL-CCNC: 96 U/L (ref 50–136)
ALT SERPL-CCNC: 28 U/L (ref 12–65)
ANION GAP SERPL CALC-SCNC: 6 MMOL/L (ref 7–16)
AST SERPL-CCNC: 22 U/L (ref 15–37)
BASOPHILS # BLD: 0.1 K/UL (ref 0–0.2)
BASOPHILS NFR BLD: 1 % (ref 0–2)
BILIRUB DIRECT SERPL-MCNC: 0.1 MG/DL
BILIRUB SERPL-MCNC: 0.2 MG/DL (ref 0.2–1.1)
BUN SERPL-MCNC: 15 MG/DL (ref 8–23)
CALCIUM SERPL-MCNC: 8.2 MG/DL (ref 8.3–10.4)
CHLORIDE SERPL-SCNC: 111 MMOL/L (ref 98–107)
CO2 SERPL-SCNC: 26 MMOL/L (ref 21–32)
CREAT SERPL-MCNC: 1.1 MG/DL (ref 0.6–1)
DIFFERENTIAL METHOD BLD: ABNORMAL
EOSINOPHIL # BLD: 0.2 K/UL (ref 0–0.8)
EOSINOPHIL NFR BLD: 2 % (ref 0.5–7.8)
ERYTHROCYTE [DISTWIDTH] IN BLOOD BY AUTOMATED COUNT: 15 % (ref 11.9–14.6)
GLOBULIN SER CALC-MCNC: 3.5 G/DL (ref 2.3–3.5)
GLUCOSE BLD STRIP.AUTO-MCNC: 130 MG/DL (ref 65–100)
GLUCOSE BLD STRIP.AUTO-MCNC: 209 MG/DL (ref 65–100)
GLUCOSE BLD STRIP.AUTO-MCNC: 383 MG/DL (ref 65–100)
GLUCOSE BLD STRIP.AUTO-MCNC: 92 MG/DL (ref 65–100)
GLUCOSE SERPL-MCNC: 73 MG/DL (ref 65–100)
HCT VFR BLD AUTO: 35.6 % (ref 35.8–46.3)
HGB BLD-MCNC: 10.8 G/DL (ref 11.7–15.4)
IMM GRANULOCYTES # BLD AUTO: 0 K/UL (ref 0–0.5)
IMM GRANULOCYTES NFR BLD AUTO: 0 % (ref 0–5)
LYMPHOCYTES # BLD: 1.4 K/UL (ref 0.5–4.6)
LYMPHOCYTES NFR BLD: 17 % (ref 13–44)
MAGNESIUM SERPL-MCNC: 2.1 MG/DL (ref 1.8–2.4)
MCH RBC QN AUTO: 29.8 PG (ref 26.1–32.9)
MCHC RBC AUTO-ENTMCNC: 30.3 G/DL (ref 31.4–35)
MCV RBC AUTO: 98.3 FL (ref 79.6–97.8)
MONOCYTES # BLD: 1 K/UL (ref 0.1–1.3)
MONOCYTES NFR BLD: 12 % (ref 4–12)
NEUTS SEG # BLD: 5.6 K/UL (ref 1.7–8.2)
NEUTS SEG NFR BLD: 68 % (ref 43–78)
NRBC # BLD: 0 K/UL (ref 0–0.2)
PHOSPHATE SERPL-MCNC: 2.6 MG/DL (ref 2.3–3.7)
PLATELET # BLD AUTO: 161 K/UL (ref 150–450)
PMV BLD AUTO: 11.6 FL (ref 9.4–12.3)
POTASSIUM SERPL-SCNC: 3.6 MMOL/L (ref 3.5–5.1)
PROT SERPL-MCNC: 6 G/DL (ref 6.3–8.2)
RBC # BLD AUTO: 3.62 M/UL (ref 4.05–5.2)
SODIUM SERPL-SCNC: 143 MMOL/L (ref 136–145)
WBC # BLD AUTO: 8.2 K/UL (ref 4.3–11.1)

## 2019-02-03 PROCEDURE — 74011000258 HC RX REV CODE- 258: Performed by: INTERNAL MEDICINE

## 2019-02-03 PROCEDURE — 94760 N-INVAS EAR/PLS OXIMETRY 1: CPT

## 2019-02-03 PROCEDURE — 84100 ASSAY OF PHOSPHORUS: CPT

## 2019-02-03 PROCEDURE — 80048 BASIC METABOLIC PNL TOTAL CA: CPT

## 2019-02-03 PROCEDURE — 80076 HEPATIC FUNCTION PANEL: CPT

## 2019-02-03 PROCEDURE — 74011636637 HC RX REV CODE- 636/637: Performed by: INTERNAL MEDICINE

## 2019-02-03 PROCEDURE — 97166 OT EVAL MOD COMPLEX 45 MIN: CPT

## 2019-02-03 PROCEDURE — 74011250636 HC RX REV CODE- 250/636: Performed by: INTERNAL MEDICINE

## 2019-02-03 PROCEDURE — 99233 SBSQ HOSP IP/OBS HIGH 50: CPT | Performed by: PSYCHIATRY & NEUROLOGY

## 2019-02-03 PROCEDURE — 82962 GLUCOSE BLOOD TEST: CPT

## 2019-02-03 PROCEDURE — 77030020250 HC SOL INJ D 5% LFCR 1000ML BG LF

## 2019-02-03 PROCEDURE — 65270000029 HC RM PRIVATE

## 2019-02-03 PROCEDURE — 74011250636 HC RX REV CODE- 250/636: Performed by: HOSPITALIST

## 2019-02-03 PROCEDURE — 97530 THERAPEUTIC ACTIVITIES: CPT

## 2019-02-03 PROCEDURE — 97535 SELF CARE MNGMENT TRAINING: CPT

## 2019-02-03 PROCEDURE — 36415 COLL VENOUS BLD VENIPUNCTURE: CPT

## 2019-02-03 PROCEDURE — 85025 COMPLETE CBC W/AUTO DIFF WBC: CPT

## 2019-02-03 PROCEDURE — 83735 ASSAY OF MAGNESIUM: CPT

## 2019-02-03 PROCEDURE — 74011250637 HC RX REV CODE- 250/637: Performed by: INTERNAL MEDICINE

## 2019-02-03 RX ORDER — HALOPERIDOL 5 MG/ML
2 INJECTION INTRAMUSCULAR ONCE
Status: COMPLETED | OUTPATIENT
Start: 2019-02-03 | End: 2019-02-03

## 2019-02-03 RX ORDER — INSULIN GLARGINE 100 [IU]/ML
45 INJECTION, SOLUTION SUBCUTANEOUS
Status: DISCONTINUED | OUTPATIENT
Start: 2019-02-03 | End: 2019-02-04

## 2019-02-03 RX ADMIN — Medication 10 ML: at 21:36

## 2019-02-03 RX ADMIN — Medication 10 ML: at 14:00

## 2019-02-03 RX ADMIN — INSULIN GLARGINE 45 UNITS: 100 INJECTION, SOLUTION SUBCUTANEOUS at 21:36

## 2019-02-03 RX ADMIN — CEFTRIAXONE SODIUM 2 G: 2 INJECTION, POWDER, FOR SOLUTION INTRAMUSCULAR; INTRAVENOUS at 03:12

## 2019-02-03 RX ADMIN — ASPIRIN 81 MG: 81 TABLET, COATED ORAL at 08:52

## 2019-02-03 RX ADMIN — ACYCLOVIR SODIUM 640 MG: 50 INJECTION, SOLUTION INTRAVENOUS at 05:00

## 2019-02-03 RX ADMIN — PANTOPRAZOLE SODIUM 40 MG: 40 TABLET, DELAYED RELEASE ORAL at 06:12

## 2019-02-03 RX ADMIN — INSULIN LISPRO 4 UNITS: 100 INJECTION, SOLUTION INTRAVENOUS; SUBCUTANEOUS at 16:59

## 2019-02-03 RX ADMIN — HALOPERIDOL LACTATE 2 MG: 5 INJECTION INTRAMUSCULAR at 02:17

## 2019-02-03 RX ADMIN — Medication 10 ML: at 05:25

## 2019-02-03 RX ADMIN — MEROPENEM 500 MG: 500 INJECTION, POWDER, FOR SOLUTION INTRAVENOUS at 17:01

## 2019-02-03 RX ADMIN — ROSUVASTATIN CALCIUM 20 MG: 20 TABLET, FILM COATED ORAL at 21:36

## 2019-02-03 RX ADMIN — ACYCLOVIR SODIUM 640 MG: 50 INJECTION, SOLUTION INTRAVENOUS at 17:00

## 2019-02-03 RX ADMIN — Medication 10 ML: at 21:37

## 2019-02-03 RX ADMIN — INSULIN LISPRO 10 UNITS: 100 INJECTION, SOLUTION INTRAVENOUS; SUBCUTANEOUS at 21:56

## 2019-02-03 NOTE — PROGRESS NOTES
Impression    Altered mental status, improved    Possible meningitis  Plan:    Discussed with Dr. Philippe Manrique         MRI - will require Sedation  Continue empiric antibiotic coverage Per infectious disease        CC AMS ? Seizure    History    Interval history:    Continues to do well. Seen by ID who doubts meningitis. Antibiotic coverage simplified        The patient is a [de-identified]year old female community dwelling female who is generally in good health. Her daughter looks in on her once a day and on the day of admission found her in her home unresponsive and incontinent of stool. The patient was taken the ED where initial evaluation revealed UTI. The patient subsequently had an episode on 1/31 described as \"yell loudly, causing staff to go to the room. She was found with her eyes rolled back in her head and shaking from the waist up. She was also frothing at the mouth\". She has been less responsive since. Interval history:    Markedly improved overnight. No further seizure-like activity noted      Past Medical History:   Diagnosis Date    CAD (coronary artery disease)     DM2 (diabetes mellitus, type 2) ([address]     Gout     HLD (hyperlipidemia)     HTN (hypertension)     Peripheral neuropathy      Past Surgical History:   Procedure Laterality Date    CARDIAC SURG PROCEDURE UNLIST      stents x 3     HX CHOLECYSTECTOMY      HX CORONARY ARTERY BYPASS GRAFT      x3    HX TUBAL LIGATION         Family History   Problem Relation Age of Onset    Hypertension Mother     Cancer Mother     Diabetes Mother     Heart Disease Mother      Social History     Tobacco Use    Smoking status: Never Smoker    Smokeless tobacco: Never Used   Substance Use Topics    Alcohol use: No    Drug use: No     No current facility-administered medications on file prior to encounter.       Current Outpatient Medications on File Prior to Encounter   Medication Sig Dispense Refill    nitrofurantoin (MACRODANTIN) 50 mg capsule Take 1 Cap by mouth nightly. 30 Cap 3    insulin glargine (LANTUS SOLOSTAR) 100 unit/mL (3 mL) pen 34 Units by SubCUTAneous route nightly. 1 Package 5    insulin lispro (HUMALOG KWIKPEN) 100 unit/mL kwikpen 24 Units SubQ with Breakfast; 24 Units SubQ with Lunch; 24 Units SubQ with Dinner. 1 Package 5    hydrochlorothiazide (HYDRODIURIL) 25 mg tablet Take 1 Tab by mouth daily. 90 Tab 1    metoprolol (LOPRESSOR) 25 mg tablet Take 1 Tab by mouth two (2) times a day. 60 Tab 5    Blood-Glucose Meter (ONETOUCH ULTRAMINI) monitoring kit Use as directed 1 Kit 0    glucose blood VI test strips (ONETOUCH ULTRA TEST) strip Test 4 times daily as directed 100 Strip 11    Lancets (ONETOUCH ULTRASOFT LANCETS) misc Test 4 times daily as directed 100 Each 11    losartan (COZAAR) 100 mg tablet Take  by mouth daily.  furosemide (LASIX) 20 mg tablet Take  by mouth two (2) times a day.  Omeprazole delayed release (PRILOSEC D/R) 20 mg tablet Take 1 Tab by mouth daily. 30 Tab 5    rosuvastatin (CRESTOR) 20 mg tablet Take 1 Tab by mouth nightly. 90 Tab 1    gabapentin (NEURONTIN) 100 mg capsule Take 1 Cap by mouth three (3) times daily. 30 Cap 5    colchicine (COLCRYS) 0.6 mg tablet Take 0.6 mg by mouth three (3) times daily as needed.  traMADol (ULTRAM) 50 mg tablet Take 1 Tab by mouth every eight (8) hours as needed for Pain. 90 Tab 5    aspirin delayed-release 81 mg tablet Take  by mouth daily.  rOPINIRole (REQUIP) 0.5 mg tablet Take 1 Tab by mouth nightly as needed.  90 Tab 1       Allergies   Allergen Reactions    Sulfa (Sulfonamide Antibiotics) Swelling     The patient is severely obtunded and cannot give ROS    Visit Vitals  /65 (BP 1 Location: Left arm, BP Patient Position: At rest)   Pulse 71   Temp 97.9 °F (36.6 °C)   Resp 22   Ht 5' 4\" (1.626 m)   Wt 174 lb 2.6 oz (79 kg)   SpO2 95%   BMI 29.90 kg/m²     General: well nourished, appears stated age    Eyes: no proptosis or exophthalmos; conjunctivae clear, sclerae non-icteric    Chest: clear to auscultation    Cardiac: normal S1 S2; no murmurs gallop or rubs    Neurological:    MSE: alert, oriented times 3; fluent speech; no paraphasic errors; follows commands without difficulty    CN 2: visual fields full; no afferent pupillary defect; VA not checked;   CN 3,4,6: Pupils symmetrical in size, reactive to light directly and consensually; no ptosis; full versions and ductions  CN 5: facial sensation intact to light touch and pin prick. ...   CN 7: Symmetrical facial tone and movements  CN 8 responds to spoken voice  CN 9,10; palate symmetrical gag intact  CN 11: head turn and shoulder shrug intact  CN 12: tongue midline without atrophy or fasiculations    Motor:  Power 5/5 UE and LE proximal to distal  Fine motor movements symmetrical  Tone normal  Atrophy: absent  Gait: Not examined    Cerebellar:  Finger to nose; heel to shin intact  Tandem Not examined    Sensory  Romberg Not performed  Intact to primary modalities in all 4 extremities    Reflexes    Symmetrical and normally active at 2+ in UE and LE  Plantar response flexor bilaterally        Recent Results (from the past 12 hour(s))   METABOLIC PANEL, BASIC    Collection Time: 02/02/19  5:14 AM   Result Value Ref Range    Sodium 148 (H) 136 - 145 mmol/L    Potassium 3.5 3.5 - 5.1 mmol/L    Chloride 114 (H) 98 - 107 mmol/L    CO2 26 21 - 32 mmol/L    Anion gap 8 7 - 16 mmol/L    Glucose 71 65 - 100 mg/dL    BUN 22 8 - 23 MG/DL    Creatinine 1.36 (H) 0.6 - 1.0 MG/DL    GFR est AA 48 (L) >60 ml/min/1.73m2    GFR est non-AA 40 (L) >60 ml/min/1.73m2    Calcium 8.2 (L) 8.3 - 10.4 MG/DL   CBC WITH AUTOMATED DIFF    Collection Time: 02/02/19  5:14 AM   Result Value Ref Range    WBC 8.3 4.3 - 11.1 K/uL    RBC 3.46 (L) 4.05 - 5.2 M/uL    HGB 10.3 (L) 11.7 - 15.4 g/dL    HCT 33.8 (L) 35.8 - 46.3 %    MCV 97.7 79.6 - 97.8 FL    MCH 29.8 26.1 - 32.9 PG    MCHC 30.5 (L) 31.4 - 35.0 g/dL    RDW 15.4 (H) 11.9 - 14.6 % PLATELET 468 227 - 061 K/uL    MPV 11.7 9.4 - 12.3 FL    ABSOLUTE NRBC 0.00 0.0 - 0.2 K/uL    DF AUTOMATED      NEUTROPHILS 70 43 - 78 %    LYMPHOCYTES 18 13 - 44 %    MONOCYTES 10 4.0 - 12.0 %    EOSINOPHILS 1 0.5 - 7.8 %    BASOPHILS 1 0.0 - 2.0 %    IMMATURE GRANULOCYTES 0 0.0 - 5.0 %    ABS. NEUTROPHILS 5.9 1.7 - 8.2 K/UL    ABS. LYMPHOCYTES 1.5 0.5 - 4.6 K/UL    ABS. MONOCYTES 0.8 0.1 - 1.3 K/UL    ABS. EOSINOPHILS 0.1 0.0 - 0.8 K/UL    ABS. BASOPHILS 0.1 0.0 - 0.2 K/UL    ABS. IMM. GRANS. 0.0 0.0 - 0.5 K/UL   LIPASE    Collection Time: 02/02/19  5:14 AM   Result Value Ref Range    Lipase 366 73 - 393 U/L   GLUCOSE, POC    Collection Time: 02/02/19  7:06 AM   Result Value Ref Range    Glucose (POC) 93 65 - 100 mg/dL     Results for [facility] (MRN [id number]) as of 2/2/2019 11:06   Ref. Range 2/1/2019 16:10   Tube No. Latest Units:   1   Protein,CSF Latest Ref Range: 15 - 45 MG/DL 8 (L)   Tube No. Latest Units:   1   Glucose,CSF Latest Ref Range: 40 - 70 MG/DL <1 (LL)   BODY FLUID TYPE Latest Units:   CEREBROSPINAL FLUID   FLUID APPEARANCE Latest Units:   CLEAR   FLUID COLOR Latest Units:   COLORLESS   FLUID RBC CT. Latest Units: /cu mm 575   FLUID WBC COUNT Latest Units: /cu mm 10   FLD NEUTROPHILS Latest Units: % 86   FLD LYMPHS Latest Units: % 12   FLD MONO/MACROPHAGES Latest Units: % 2   TOTAL CELLS COUNTED Latest Units:   50     2/2/2019  7:56 AM - [place], Lab In [facility]     Specimen Information: Cerebrospinal Fluid        Component Value Ref Range & Units Status   Special Requests: NO SPECIAL REQUESTS    Preliminary   GRAM STAIN 0 TO 2 WBCS SEEN PER OIF   Preliminary   GRAM STAIN NO DEFINITE ORGANISM SEEN    Preliminary   Culture result:    Preliminary   NO GROWTH AFTER SHORT PERIOD OF INCUBATION. FURTHER RESULTS TO FOLLOW AFTER OVERNIGHT INCUBATION.         Signed By: Gomez Lomas MD     February 2, 2019

## 2019-02-03 NOTE — PROGRESS NOTES
Problem: Self Care Deficits Care Plan (Adult)  Goal: *Acute Goals and Plan of Care (Insert Text)  1. Patient will complete upper body bathing and dressing with stand by assist and adaptive equipment as needed. 2. Patient will complete toileting with moderate assist and adaptive equipment as needed. 3. Patient will tolerate 25 minutes of OT treatment with 1-2 rest breaks to increase activity tolerance for ADLs. 4. Patient will complete functional transfers with minimal assistance and adaptive equipment as needed. Timeframe: 7 visits     OCCUPATIONAL THERAPY: Initial Assessment and AM 2/3/2019  INPATIENT:    Payor: Ursula Miranda / Plan: [address] / Product Type: Managed Care Medicare /      NAME/AGE/GENDER: Renate Gilford is a [de-identified] y.o. female   PRIMARY DIAGNOSIS:  Hyperglycemia due to type 2 diabetes mellitus ([facility] [address]  Acute metabolic encephalopathy Acute metabolic encephalopathy Acute metabolic encephalopathy  Procedure(s) (LRB):  MISC ANESTHESIA (N/A)  1 Day Post-Op  ICD-10: Treatment Diagnosis:    · Generalized Muscle Weakness (M62.81)  · Other lack of cordination (R27.8)   Precautions/Allergies:   CONTACT PRECAUTIONS    Sulfa (sulfonamide antibiotics)      ASSESSMENT:     Ms. Ira Silveira  is a [de-identified] y.o. female admitted for above. At baseline per chart, patient lives alone and has caregiver to assist with ADLs/IADLs but only comes in the morning and leaves. Patient is oriented to self only, very lethargic however opens eyes to voice and follows most commands. No family present to provide history. Renate Gilford found supine and agreeable to OT evaluation. Reports no visual pain. Patient able to complete bed mobility with min assist and pull to stand with mod assist then quickly descents back onto bed after 20 seconds. Patient requires min assist for self feeding and hand over hand to initiate feeding, mod assist for upper body dressing while seated at edge of bed. Patient shows improvement in following commands and completing functional tasks this date according to chart. Patient's deficits include impaired UB strength, gross/fine motor coordination, activity tolerance, and balance. BUE are 2+/5 strength. Pippa Max is currently functioning below baseline for ADLs and would benefit from acute OT to increase independence and safety with ADLs. Will follow for duration of hospital stay to address the above goals. Patient was educated on role and plan of care of occupational therapy. This section established at most recent assessment   PROBLEM LIST (Impairments causing functional limitations):  1. Decreased Strength  2. Decreased ADL/Functional Activities  3. Decreased Transfer Abilities  4. Decreased Ambulation Ability/Technique  5. Decreased Balance  6. Decreased Activity Tolerance  7. Decreased Cognition   INTERVENTIONS PLANNED: (Benefits and precautions of occupational therapy have been discussed with the patient.)  1. Activities of daily living training  2. Balance training  3. Clothing management  4. Therapeutic activity  5. Therapeutic exercise     TREATMENT PLAN: Frequency/Duration: Follow patient 3x/week to address above goals. Rehabilitation Potential For Stated Goals: Good     RECOMMENDED REHABILITATION/EQUIPMENT: (at time of discharge pending progress): Due to the probability of continued deficits (see above) this patient will likely need continued skilled occupational therapy after discharge. Equipment:    None at this time              OCCUPATIONAL PROFILE AND HISTORY:   History of Present Injury/Illness (Reason for Referral):  See H&P  Past Medical History/Comorbidities:   Ms. Corie Joyner  has a past medical history of CAD (coronary artery disease), DM2 (diabetes mellitus, type 2) ([facility] [address] Gout, HLD (hyperlipidemia), HTN (hypertension), and Peripheral neuropathy.   Ms. Corie Joyner  has a past surgical history that includes pr cardiac surg procedure unlist; narinder cholecystectomy; hx tubal ligation; and hx coronary artery bypass graft. Social History/Living Environment:   Home Environment: Apartment  One/Two Story Residence: One story  Living Alone: Yes  Support Systems: Family member(s), Friends \ neighbors  Patient Expects to be Discharged to[de-identified] Rehabilitation facility  Current DME Used/Available at Home: Walker, rolling, Cane, straight, Wheelchair  Prior Level of Function/Work/Activity:  See above  Personal Factors:          Sex:  female        Age:  [de-identified] y.o. Number of Personal Factors/Comorbidities that affect the Plan of Care: Expanded review of therapy/medical records (1-2):  MODERATE COMPLEXITY   ASSESSMENT OF OCCUPATIONAL PERFORMANCE[de-identified]   Activities of Daily Living:   Basic ADLs (From Assessment) Complex ADLs (From Assessment)   Feeding: Minimum assistance  Oral Facial Hygiene/Grooming: Moderate assistance  Bathing: Maximum assistance  Upper Body Dressing: Moderate assistance  Lower Body Dressing: Maximum assistance  Toileting: Total assistance     Grooming/Bathing/Dressing Activities of Daily Living     Cognitive Retraining  Safety/Judgement: Decreased awareness of environment;Decreased awareness of need for assistance                       Bed/Mat Mobility  Supine to Sit: Minimum assistance  Sit to Supine: Moderate assistance  Sit to Stand: Moderate assistance  Scooting: Total assistance       Most Recent Physical Functioning:   Gross Assessment:  AROM: Generally decreased, functional  PROM: Within functional limits  Strength: Generally decreased, functional               Posture:  Posture (WDL): Exceptions to WDL  Posture Assessment: Forward head, Rounded shoulders  Balance:  Sitting: Impaired  Sitting - Static: Fair (occasional)  Sitting - Dynamic: Poor (constant support)  Standing: Impaired;Pull to stand  Standing - Static: Fair;Constant support Bed Mobility:  Supine to Sit: Minimum assistance  Sit to Supine: Moderate assistance  Scooting:  Total assistance  Wheelchair Mobility:     Transfers:  Sit to Stand: Moderate assistance  Stand to Sit: Moderate assistance            Patient Vitals for the past 6 hrs:   BP BP Patient Position SpO2 O2 Flow Rate (L/min) Pulse   02/03/19 0541 133/73 At rest 90 % -- (!) 53   02/03/19 0733 122/64 At rest 100 % -- (!) 50   02/03/19 0930 -- -- 99 % 2.5 l/min (!) 50       Mental Status  Neurologic State: Drowsy, Eyes open to voice  Orientation Level: Oriented to person  Cognition: Follows commands, Poor safety awareness  Perception: Cues to attend right visual field, Cues to attend left visual field  Perseveration: No perseveration noted  Safety/Judgement: Decreased awareness of environment, Decreased awareness of need for assistance                          Physical Skills Involved:  1. Range of Motion  2. Balance  3. Strength  4. Activity Tolerance  5. Fine Motor Control  6. Gross Motor Control Cognitive Skills Affected (resulting in the inability to perform in a timely and safe manner):  1. Perception  2. Executive Function  3. Short Term Recall  4. Long Term Memory  5. Sustained Attention Psychosocial Skills Affected:  1. Habits/Routines  2. Social Interaction  3. Emotional Regulation   Number of elements that affect the Plan of Care: 3-5:  MODERATE COMPLEXITY   CLINICAL DECISION MAKING:   [employer] AM-PAC 6 Clicks   Daily Activity Inpatient Short Form  How much help from another person does the patient currently need. .. Total A Lot A Little None   1. Putting on and taking off regular lower body clothing? [x] 1   [] 2   [] 3   [] 4   2. Bathing (including washing, rinsing, drying)? [] 1   [x] 2   [] 3   [] 4   3. Toileting, which includes using toilet, bedpan or urinal?   [x] 1   [] 2   [] 3   [] 4   4. Putting on and taking off regular upper body clothing? [] 1   [x] 2   [] 3   [] 4   5. Taking care of personal grooming such as brushing teeth? [] 1   [] 2   [x] 3   [] 4   6. Eating meals?    [] 1 [] 2   [x] 3   [] 4   © 2007, Trustees of [address] [place] [address] [address] [id number], under license to [employer]. All rights reserved      Score:  Initial: 12 Most Recent: X (Date: -- )    Interpretation of Tool:  Represents activities that are increasingly more difficult (i.e. Bed mobility, Transfers, Gait). Medical Necessity:     · Patient demonstrates good rehab potential due to higher previous functional level. Reason for Services/Other Comments:  · Patient continues to require modification of therapeutic interventions to increase complexity of exercises. Use of outcome tool(s) and clinical judgement create a POC that gives a: MODERATE COMPLEXITY         TREATMENT:   (In addition to Assessment/Re-Assessment sessions the following treatments were rendered)     Pre-treatment Symptoms/Complaints:    Pain: Initial:   Pain Intensity 1: 0  Post Session:  0     Self Care: (10): Procedure(s) (per grid) utilized to improve and/or restore self-care/home management as related to dressing and self feeding. Required maximal verbal, tactile and   cueing to facilitate activities of daily living skills. Therapeutic Activity: (    12): Therapeutic activities including Bed transfers and static standing to improve strength, balance and coordination. Required maximal   to promote static balance in standing and sitting.     x    Braces/Orthotics/Lines/Etc:   · IV  · O2 Device: Nasal cannula  Treatment/Session Assessment:    · Response to Treatment:  Supine in bed with needs in reach. · Interdisciplinary Collaboration:   o Occupational Therapist  o Registered Nurse  · After treatment position/precautions:   o Supine in bed  o Bed alarm/tab alert on  o Bed/Chair-wheels locked  o Bed in low position  o Call light within reach  o RN notified   · Compliance with Program/Exercises: Will assess as treatment progresses. · Recommendations/Intent for next treatment session: \"Next visit will focus on advancements to more challenging activities\".   Total Treatment Duration:  OT Patient Time In/Time Out  Time In: 0930  Time Out: [place], OT

## 2019-02-03 NOTE — PROGRESS NOTES
Chrart reviewed; patient not examined. UC with ESBL E coli. Despite clinical improvement with Ceftriaxone would still switch to carbapenem.

## 2019-02-03 NOTE — PROGRESS NOTES
Pt talking to individuals not present. Pointing to corners of room stating \"do you see that? \" Pulling at IV lines and screaming saying \"shes going to get me. \" Pt is also attempting to get out of bed, \"Lillian got to get away. \" MD notified. 2 mg Haldol ordered.  Medicated and will continue to monitor

## 2019-02-03 NOTE — PROGRESS NOTES
Hospitalist Progress Note     Admit Date:  2019 10:03 AM   Name:  Luisito Cruz   Age:  [de-identified] y.o.  :  1940   MRN:  [id number]   PCP:  Sharon Cast MD  Treatment Team: Attending Provider: Margy Jimenez MD; Utilization Review: Kemar Capone RN; Primary Nurse: Sangita Dewitt; Care Manager: Debera Rubinstein, [facility]; Consulting Provider: Eze Mata MD; Consulting Provider: Claudette Yuen MD    Subjective:   Pt is an [de-identified] y/o F with DM, HTN, recurrent UTIs on suppressive macrobid, who presented from home with altered mental status. Was severely altered and could not respond or make eye contact. She has a caretaker who helps her in the morning but otherwise lives alone. Was admitted with UTI and AoCKD. Started on rocephin, IVF. Zosyn was ordered due to failure to improve and recurrent low grade fever. Was extremely lethargic so neuro consulted on . Concern for meningitis so CSF obtained which had glc<1. Pt started on vanc/rocephin/amp/acyclovir after CSF obtained. Was unable to get MRI initially due to pt movement. EEG showed severe encephalopathy. ID consulted for assistance also. Her mental status vastly improved on .    2/3 - pt seen with daughter at bedside. Daughter reports much improvement in mental status but still not at baseline, slightly confused at times. She has chronic LBP but no other complaints today.   No HA, fevers    Objective:     Patient Vitals for the past 24 hrs:   Temp Pulse Resp BP SpO2   19 1039 97.5 °F (36.4 °C) (!) 49 20 134/71 100 %   19 0930 -- (!) 50 -- -- 99 %   19 0733 96.5 °F (35.8 °C) (!) 50 20 122/64 100 %   19 0541 97.1 °F (36.2 °C) (!) 53 20 133/73 90 %   19 2341 98 °F (36.7 °C) 66 20 116/63 92 %   19 2304 97.9 °F (36.6 °C) 60 20 152/78 99 %   19 1920 97.7 °F (36.5 °C) 68 20 135/72 96 %   19 1445 97.3 °F (36.3 °C) 73 20 145/71 93 %   19 1357 97.5 °F (36.4 °C) 70 20 143/73 96 %   02/02/19 1353 -- 74 20 149/66 --   02/02/19 1347 -- 72 17 -- 96 %   02/02/19 1337 -- 72 25 150/72 93 %   02/02/19 1332 -- 70 29 156/69 --   02/02/19 1328 -- 74 (!) 36 129/67 --   02/02/19 1325 -- 72 24 -- 100 %   02/02/19 1323 97.3 °F (36.3 °C) 72 16 170/79 100 %   02/02/19 1322 -- 71 21 170/79 100 %     Oxygen Therapy  O2 Sat (%): 100 % (02/03/19 1039)  Pulse via Oximetry: 72 beats per minute (02/02/19 1347)  O2 Device: Nasal cannula (02/03/19 0930)  O2 Flow Rate (L/min): 2.5 l/min (02/03/19 0930)    Intake/Output Summary (Last 24 hours) at 2/3/2019 1114  Last data filed at 2/3/2019 0805  Gross per 24 hour   Intake 970 ml   Output 1600 ml   Net -630 ml       *Note that automatically entered I/Os may not be accurate; dependent on patient compliance with collection and accurate  by assistants. Physical Exam:  General:    Well nourished. Alert. No distress  Eyes:   Normal sclera. PERRL  HENT:  Normocephalic, atraumatic. Moist mucous membranes  CV:   Damon Frieze, reg. No m/r/g. Lungs:  CTAB. No wheezing, rhonchi, or rales. Extremities: Warm and dry. No cyanosis or edema. Skin:     No rashes or jaundice. Normal coloration    I reviewed the labs, imaging, EKGs, telemetry, and other studies done this admission.   Data Review:   Recent Results (from the past 24 hour(s))   GLUCOSE, POC    Collection Time: 02/02/19  3:55 PM   Result Value Ref Range    Glucose (POC) 128 (H) 65 - 100 mg/dL   GLUCOSE, POC    Collection Time: 02/02/19  8:19 PM   Result Value Ref Range    Glucose (POC) 316 (H) 65 - 100 mg/dL   CBC WITH AUTOMATED DIFF    Collection Time: 02/03/19  5:34 AM   Result Value Ref Range    WBC 8.2 4.3 - 11.1 K/uL    RBC 3.62 (L) 4.05 - 5.2 M/uL    HGB 10.8 (L) 11.7 - 15.4 g/dL    HCT 35.6 (L) 35.8 - 46.3 %    MCV 98.3 (H) 79.6 - 97.8 FL    MCH 29.8 26.1 - 32.9 PG    MCHC 30.3 (L) 31.4 - 35.0 g/dL    RDW 15.0 (H) 11.9 - 14.6 %    PLATELET 075 584 - 254 K/uL    MPV 11.6 9.4 - 12.3 FL ABSOLUTE NRBC 0.00 0.0 - 0.2 K/uL    DF AUTOMATED      NEUTROPHILS 68 43 - 78 %    LYMPHOCYTES 17 13 - 44 %    MONOCYTES 12 4.0 - 12.0 %    EOSINOPHILS 2 0.5 - 7.8 %    BASOPHILS 1 0.0 - 2.0 %    IMMATURE GRANULOCYTES 0 0.0 - 5.0 %    ABS. NEUTROPHILS 5.6 1.7 - 8.2 K/UL    ABS. LYMPHOCYTES 1.4 0.5 - 4.6 K/UL    ABS. MONOCYTES 1.0 0.1 - 1.3 K/UL    ABS. EOSINOPHILS 0.2 0.0 - 0.8 K/UL    ABS. BASOPHILS 0.1 0.0 - 0.2 K/UL    ABS. IMM. GRANS. 0.0 0.0 - 0.5 K/UL   METABOLIC PANEL, BASIC    Collection Time: 02/03/19  5:34 AM   Result Value Ref Range    Sodium 143 136 - 145 mmol/L    Potassium 3.6 3.5 - 5.1 mmol/L    Chloride 111 (H) 98 - 107 mmol/L    CO2 26 21 - 32 mmol/L    Anion gap 6 (L) 7 - 16 mmol/L    Glucose 73 65 - 100 mg/dL    BUN 15 8 - 23 MG/DL    Creatinine 1.10 (H) 0.6 - 1.0 MG/DL    GFR est AA >60 >60 ml/min/1.73m2    GFR est non-AA 51 (L) >60 ml/min/1.73m2    Calcium 8.2 (L) 8.3 - 10.4 MG/DL   MAGNESIUM    Collection Time: 02/03/19  5:34 AM   Result Value Ref Range    Magnesium 2.1 1.8 - 2.4 mg/dL   PHOSPHORUS    Collection Time: 02/03/19  5:34 AM   Result Value Ref Range    Phosphorus 2.6 2.3 - 3.7 MG/DL   HEPATIC FUNCTION PANEL    Collection Time: 02/03/19  5:34 AM   Result Value Ref Range    Protein, total 6.0 (L) 6.3 - 8.2 g/dL    Albumin 2.5 (L) 3.2 - 4.6 g/dL    Globulin 3.5 2.3 - 3.5 g/dL    A-G Ratio 0.7 (L) 1.2 - 3.5      Bilirubin, total 0.2 0.2 - 1.1 MG/DL    Bilirubin, direct 0.1 <0.4 MG/DL    Alk.  phosphatase 96 50 - 136 U/L    AST (SGOT) 22 15 - 37 U/L    ALT (SGPT) 28 12 - 65 U/L   GLUCOSE, POC    Collection Time: 02/03/19  7:31 AM   Result Value Ref Range    Glucose (POC) 92 65 - 100 mg/dL       All Micro Results     Procedure Component Value Units Date/Time    CULTURE, CSF Laurance Lias STAIN [[id number]] Collected:  02/01/19 1610    Order Status:  Completed Specimen:  Cerebrospinal Fluid Updated:  02/03/19 0194     Special Requests: NO SPECIAL REQUESTS        GRAM STAIN 0 TO 2 WBCS SEEN PER OIF NO DEFINITE ORGANISM SEEN        Culture result: NO GROWTH 1 DAY       CULTURE, URINE [[id number]]  (Abnormal)  (Susceptibility) Collected:  01/30/19 1220    Order Status:  Completed Specimen:  Urine from Clean catch Updated:  02/02/19 1533     Special Requests: NO SPECIAL REQUESTS        Culture result:       >100,000 COLONIES/mL ESCHERICHIA COLI ** (EXTENDED SPECTRUM BETA LACTAMASE ) **                  RESULTS VERIFIED, PHONED TO AND READ BACK BY  Tyler Sanders RN @ 3404 ON 2/2/2019 BY AMM      CRYPTOCOCCAL AG, CSF W/REFLEX TITER [[id number]] Collected:  02/01/19 1610    Order Status:  Completed Specimen:  Serum Updated:  02/01/19 1723     Cryptococcus Ag, CSF NEGATIVE        HSV 1 AND 2 BY PCR [[id number]] Collected:  02/01/19 1635    Order Status:  Canceled Specimen:  Other     MENINGITIS PATHOGENS PANEL, CSF (BY PCR) [[id number]] Collected:  02/01/19 1545    Order Status:  Canceled Specimen:  Cerebrospinal Fluid     CSF HOLD [[id number]]     Order Status:  Sent Specimen:  Cerebrospinal Fluid     CRYPTOCOCCUS AG W/REFLEX TITER [[id number]] Collected:  02/01/19 1530    Order Status:  Canceled Specimen:  Serum from Blood           Current Facility-Administered Medications   Medication Dose Route Frequency    insulin glargine (LANTUS) injection 55 Units  55 Units SubCUTAneous QHS    dextrose 5% infusion  125 mL/hr IntraVENous CONTINUOUS    LORazepam (ATIVAN) injection 2 mg  2 mg IntraVENous Q4H PRN    cefTRIAXone (ROCEPHIN) 2 g in 0.9% sodium chloride (MBP/ADV) 50 mL  2 g IntraVENous Q12H    acyclovir (ZOVIRAX) 640 mg in 0.9% sodium chloride 100 mL IVPB  640 mg IntraVENous Q12H    sodium chloride (NS) flush 5-40 mL  5-40 mL IntraVENous Q8H    sodium chloride (NS) flush 5-40 mL  5-40 mL IntraVENous PRN    aspirin delayed-release tablet 81 mg  81 mg Oral DAILY    colchicine tablet 0.6 mg  0.6 mg Oral TID PRN    metoprolol tartrate (LOPRESSOR) tablet 25 mg  25 mg Oral BID    pantoprazole (PROTONIX) tablet 40 mg  40 mg Oral ACB    rOPINIRole (REQUIP) tablet 0.5 mg  0.5 mg Oral QHS PRN    rosuvastatin (CRESTOR) tablet 20 mg  20 mg Oral QHS    traMADol (ULTRAM) tablet 50 mg  50 mg Oral Q8H PRN    sodium chloride (NS) flush 5-40 mL  5-40 mL IntraVENous Q8H    sodium chloride (NS) flush 5-40 mL  5-40 mL IntraVENous PRN    acetaminophen (TYLENOL) tablet 650 mg  650 mg Oral Q4H PRN    HYDROcodone-acetaminophen (NORCO) 5-325 mg per tablet 1 Tab  1 Tab Oral Q4H PRN    naloxone (NARCAN) injection 0.4 mg  0.4 mg IntraVENous PRN    diphenhydrAMINE (BENADRYL) capsule 25 mg  25 mg Oral Q4H PRN    ondansetron (ZOFRAN) injection 4 mg  4 mg IntraVENous Q4H PRN    senna-docusate (PERICOLACE) 8.6-50 mg per tablet 2 Tab  2 Tab Oral DAILY PRN    LORazepam (ATIVAN) tablet 0.5 mg  0.5 mg Oral Q4H PRN    zolpidem (AMBIEN) tablet 5 mg  5 mg Oral QHS PRN    insulin lispro (HUMALOG) injection   SubCUTAneous AC&HS    dextrose 40% (GLUTOSE) oral gel 1 Tube  15 g Oral PRN    glucagon (GLUCAGEN) injection 1 mg  1 mg IntraMUSCular PRN    dextrose (D50W) injection syrg 12.5-25 g  25-50 mL IntraVENous PRN    bisacodyl (DULCOLAX) tablet 5 mg  5 mg Oral DAILY PRN    alum-mag hydroxide-simeth (MYLANTA) oral suspension 30 mL  30 mL Oral Q4H PRN    hydrALAZINE (APRESOLINE) 20 mg/mL injection 20 mg  20 mg IntraVENous Q4H PRN       Other Studies:  Mri Brain W Wo Cont    Result Date: 2/2/2019  MRI of the brain INDICATION:  Seizures, encephalopathy Standard MRI sequences were obtained through the brain in multiple planes. Images were obtained before and after intravenous infusion of 15 mL of Dotarem. FINDINGS: There is mild diffuse cerebral and cerebellar atrophy. There is mild chronic change in the basal ganglia and brainstem. There is focal encephalomalacia in the right occipital lobe, likely old infarct. Temporal lobes are symmetric in size. .  There is no evidence of acute hemorrhage or infarction.   The ventricles are normal in size. There are no extra-axial fluid collections. There are no bony lesions. The sinuses are clear. Post-contrast images show no abnormal enhancement. There are no masses. IMPRESSION: Old right occipital infarct. No evidence of acute intracranial abnormality       Assessment and Plan:     Hospital Problems as of 2/3/2019 Date Reviewed: 1/30/2019          Codes Class Noted - Resolved POA    Seizure-like activity ([facility] ICD-10-CM: R56.9  ICD-9-CM: 780.39  2/1/2019 - Present Yes        Acute renal failure superimposed on stage 3 chronic kidney disease ([facility] ICD-10-CM: N17.9, N18.3  ICD-9-CM: 584.9, 585.3  1/31/2019 - Present Yes        Hyperglycemia due to type 2 diabetes mellitus ([facility] ICD-10-CM: E11.65  ICD-9-CM: 250.00  1/30/2019 - Present Yes        * (Principal) Acute metabolic encephalopathy [id number]: G93.41  ICD-9-CM: 348.31  1/30/2019 - Present Yes        Acute cystitis without hematuria ICD-10-CM: N30.00  ICD-9-CM: 595.0  1/30/2019 - Present Yes        Uncontrolled type 2 diabetes mellitus with hyperglycemia (HCC) (Chronic) ICD-10-CM: E11.65  ICD-9-CM: 250.02  6/15/2015 - Present Yes        Essential hypertension (Chronic) ICD-10-CM: I10  ICD-9-CM: 401.9  6/15/2015 - Present Yes        Chronic kidney disease, stage III (moderate) (HCC) (Chronic) ICD-10-CM: N18.3  ICD-9-CM: 585.3  6/15/2015 - Present Yes              Plan:  Encephalopathy, possible meningitis  -mental status vastly improved vs admission  -MRI no acute abnormalities  -on rocephin/acyclovir started 2/1. S/p rocephin followed by zosyn 1/30-1/31. -follow CSF and other cultures  -SLP diet [facility] soft  -neuro and ID consulted, appreciate help    MDR E coli in Urine  -Unclear significance. Micro showing resistance to all of the medications we have given this hospitalization but patient is clinically improved. I will defer these findings to ID who is following already.   Monitor for clinical changes and no abx changes right now unless directed by ID. Contact isolation    Possible seizure activity  -monitor for recurrence  -PRN ativan    HyperNa  -resolved, stop D5W. DM2 with hyperglycemia  -decrease lantus from 55 to 45u tonight. -ISS    HTN  -holding home HCTZ, losartan, lasix as BP well controlled. -stop metoprolol today due to bradycardia.   - PRN hydralazine IV    AoCKD  -at baseline now    Discharge planning:  CM says pt has SNF bed whenever she becomes medically stable and ready    Signed:  Hakeem Gasca MD

## 2019-02-03 NOTE — PROGRESS NOTES
Problem: Falls - Risk of  Goal: *Absence of Falls  Document Hood Fall Risk and appropriate interventions in the flowsheet. Outcome: Progressing Towards Goal  Fall Risk Interventions:       Mentation Interventions: Adequate sleep, hydration, pain control, Evaluate medications/consider consulting pharmacy, Increase mobility, More frequent rounding, Reorient patient    Medication Interventions: Evaluate medications/consider consulting pharmacy    Elimination Interventions: Bed/chair exit alarm, Call light in reach, Patient to call for help with toileting needs, Toileting schedule/hourly rounds             Problem: Pressure Injury - Risk of  Goal: *Prevention of pressure injury  Document Ashu Scale and appropriate interventions in the flowsheet.   Outcome: Progressing Towards Goal  Pressure Injury Interventions:  Sensory Interventions: Assess changes in LOC, Assess need for specialty bed, Keep linens dry and wrinkle-free, Minimize linen layers, Pressure redistribution bed/mattress (bed type)    Moisture Interventions: Apply protective barrier, creams and emollients, Absorbent underpads, Limit adult briefs, Minimize layers    Activity Interventions: Assess need for specialty bed, Increase time out of bed, Pressure redistribution bed/mattress(bed type), PT/OT evaluation    Mobility Interventions: Assess need for specialty bed, Pressure redistribution bed/mattress (bed type), PT/OT evaluation    Nutrition Interventions: Document food/fluid/supplement intake    Friction and Shear Interventions: Apply protective barrier, creams and emollients, Foam dressings/transparent film/skin sealants, Minimize layers

## 2019-02-04 LAB
ANION GAP SERPL CALC-SCNC: 6 MMOL/L (ref 7–16)
BASOPHILS # BLD: 0 K/UL (ref 0–0.2)
BASOPHILS NFR BLD: 1 % (ref 0–2)
BUN SERPL-MCNC: 20 MG/DL (ref 8–23)
CALCIUM SERPL-MCNC: 8 MG/DL (ref 8.3–10.4)
CHLORIDE SERPL-SCNC: 108 MMOL/L (ref 98–107)
CO2 SERPL-SCNC: 27 MMOL/L (ref 21–32)
CREAT SERPL-MCNC: 1.44 MG/DL (ref 0.6–1)
DIFFERENTIAL METHOD BLD: ABNORMAL
EOSINOPHIL # BLD: 0.2 K/UL (ref 0–0.8)
EOSINOPHIL NFR BLD: 4 % (ref 0.5–7.8)
ERYTHROCYTE [DISTWIDTH] IN BLOOD BY AUTOMATED COUNT: 14.9 % (ref 11.9–14.6)
GLUCOSE BLD STRIP.AUTO-MCNC: 285 MG/DL (ref 65–100)
GLUCOSE BLD STRIP.AUTO-MCNC: 310 MG/DL (ref 65–100)
GLUCOSE BLD STRIP.AUTO-MCNC: 338 MG/DL (ref 65–100)
GLUCOSE BLD STRIP.AUTO-MCNC: 357 MG/DL (ref 65–100)
GLUCOSE BLD STRIP.AUTO-MCNC: 413 MG/DL (ref 65–100)
GLUCOSE BLD STRIP.AUTO-MCNC: 468 MG/DL (ref 65–100)
GLUCOSE BLD STRIP.AUTO-MCNC: 488 MG/DL (ref 65–100)
GLUCOSE SERPL-MCNC: 276 MG/DL (ref 65–100)
HCT VFR BLD AUTO: 32.1 % (ref 35.8–46.3)
HGB BLD-MCNC: 9.9 G/DL (ref 11.7–15.4)
IMM GRANULOCYTES # BLD AUTO: 0 K/UL (ref 0–0.5)
IMM GRANULOCYTES NFR BLD AUTO: 0 % (ref 0–5)
LYMPHOCYTES # BLD: 1 K/UL (ref 0.5–4.6)
LYMPHOCYTES NFR BLD: 19 % (ref 13–44)
MCH RBC QN AUTO: 30.1 PG (ref 26.1–32.9)
MCHC RBC AUTO-ENTMCNC: 30.8 G/DL (ref 31.4–35)
MCV RBC AUTO: 97.6 FL (ref 79.6–97.8)
MONOCYTES # BLD: 0.5 K/UL (ref 0.1–1.3)
MONOCYTES NFR BLD: 10 % (ref 4–12)
NEUTS SEG # BLD: 3.8 K/UL (ref 1.7–8.2)
NEUTS SEG NFR BLD: 67 % (ref 43–78)
NRBC # BLD: 0 K/UL (ref 0–0.2)
PLATELET # BLD AUTO: 161 K/UL (ref 150–450)
PMV BLD AUTO: 11.8 FL (ref 9.4–12.3)
POTASSIUM SERPL-SCNC: 3.8 MMOL/L (ref 3.5–5.1)
RBC # BLD AUTO: 3.29 M/UL (ref 4.05–5.2)
SODIUM SERPL-SCNC: 141 MMOL/L (ref 136–145)
WBC # BLD AUTO: 5.6 K/UL (ref 4.3–11.1)

## 2019-02-04 PROCEDURE — 82962 GLUCOSE BLOOD TEST: CPT

## 2019-02-04 PROCEDURE — 77030019605

## 2019-02-04 PROCEDURE — 99232 SBSQ HOSP IP/OBS MODERATE 35: CPT | Performed by: NURSE PRACTITIONER

## 2019-02-04 PROCEDURE — 92523 SPEECH SOUND LANG COMPREHEN: CPT

## 2019-02-04 PROCEDURE — 74011250636 HC RX REV CODE- 250/636: Performed by: INTERNAL MEDICINE

## 2019-02-04 PROCEDURE — 74011636637 HC RX REV CODE- 636/637: Performed by: INTERNAL MEDICINE

## 2019-02-04 PROCEDURE — 74011250637 HC RX REV CODE- 250/637: Performed by: INTERNAL MEDICINE

## 2019-02-04 PROCEDURE — 92526 ORAL FUNCTION THERAPY: CPT

## 2019-02-04 PROCEDURE — 36415 COLL VENOUS BLD VENIPUNCTURE: CPT

## 2019-02-04 PROCEDURE — 65270000029 HC RM PRIVATE

## 2019-02-04 PROCEDURE — 74011000258 HC RX REV CODE- 258: Performed by: INTERNAL MEDICINE

## 2019-02-04 PROCEDURE — 85025 COMPLETE CBC W/AUTO DIFF WBC: CPT

## 2019-02-04 PROCEDURE — 80048 BASIC METABOLIC PNL TOTAL CA: CPT

## 2019-02-04 PROCEDURE — 97530 THERAPEUTIC ACTIVITIES: CPT

## 2019-02-04 RX ORDER — INSULIN GLARGINE 100 [IU]/ML
60 INJECTION, SOLUTION SUBCUTANEOUS
Status: DISCONTINUED | OUTPATIENT
Start: 2019-02-04 | End: 2019-02-06

## 2019-02-04 RX ORDER — INSULIN GLARGINE 100 [IU]/ML
50 INJECTION, SOLUTION SUBCUTANEOUS
Status: DISCONTINUED | OUTPATIENT
Start: 2019-02-04 | End: 2019-02-04

## 2019-02-04 RX ORDER — INSULIN LISPRO 100 [IU]/ML
20 INJECTION, SOLUTION INTRAVENOUS; SUBCUTANEOUS ONCE
Status: COMPLETED | OUTPATIENT
Start: 2019-02-04 | End: 2019-02-04

## 2019-02-04 RX ADMIN — MEROPENEM 500 MG: 500 INJECTION, POWDER, FOR SOLUTION INTRAVENOUS at 01:44

## 2019-02-04 RX ADMIN — Medication 10 ML: at 05:45

## 2019-02-04 RX ADMIN — Medication 40 ML: at 22:27

## 2019-02-04 RX ADMIN — INSULIN LISPRO 10 UNITS: 100 INJECTION, SOLUTION INTRAVENOUS; SUBCUTANEOUS at 12:30

## 2019-02-04 RX ADMIN — MEROPENEM 500 MG: 500 INJECTION, POWDER, FOR SOLUTION INTRAVENOUS at 17:26

## 2019-02-04 RX ADMIN — ACYCLOVIR SODIUM 640 MG: 50 INJECTION, SOLUTION INTRAVENOUS at 05:27

## 2019-02-04 RX ADMIN — INSULIN LISPRO 6 UNITS: 100 INJECTION, SOLUTION INTRAVENOUS; SUBCUTANEOUS at 09:54

## 2019-02-04 RX ADMIN — PANTOPRAZOLE SODIUM 40 MG: 40 TABLET, DELAYED RELEASE ORAL at 05:27

## 2019-02-04 RX ADMIN — INSULIN LISPRO 8 UNITS: 100 INJECTION, SOLUTION INTRAVENOUS; SUBCUTANEOUS at 22:24

## 2019-02-04 RX ADMIN — ASPIRIN 81 MG: 81 TABLET, COATED ORAL at 09:54

## 2019-02-04 RX ADMIN — Medication 10 ML: at 14:00

## 2019-02-04 RX ADMIN — MEROPENEM 500 MG: 500 INJECTION, POWDER, FOR SOLUTION INTRAVENOUS at 09:54

## 2019-02-04 RX ADMIN — INSULIN GLARGINE 60 UNITS: 100 INJECTION, SOLUTION SUBCUTANEOUS at 22:24

## 2019-02-04 RX ADMIN — ROSUVASTATIN CALCIUM 20 MG: 20 TABLET, FILM COATED ORAL at 22:25

## 2019-02-04 RX ADMIN — INSULIN LISPRO 20 UNITS: 100 INJECTION, SOLUTION INTRAVENOUS; SUBCUTANEOUS at 15:41

## 2019-02-04 RX ADMIN — Medication 10 ML: at 22:34

## 2019-02-04 NOTE — PROGRESS NOTES
Infectious Disease Progress Note    Today's Date: 2019   Admit Date: 2019    Impression:   · Altered mental status, improved, s/p LP with 10 WBCs, 86% neutrophils, 12% lymphs 2% monos, and 575 RBCs. Protein 8, glucose <1. 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 · ESBL E coli UTI   · Blood glucose 587 on admission; this has improved. Plan:   · Continue Merrem    · Add Meningitis pathogen panel on to CSF studies  · Overall she is improving     Anti-infectives:   · Vancomycin  · Ceftriaxone  · Ampicillin  · Acyclovir  · Merrem    Subjective: Allergies   Allergen Reactions    Sulfa (Sulfonamide Antibiotics) Swelling        Review of Systems:  Pertinent items are noted in the History of Present Illness.     Objective:     Visit Vitals  /61   Pulse 80   Temp 98.1 °F (36.7 °C)   Resp 18   Ht 5' 4\" (1.626 m)   Wt 79 kg (174 lb 2.6 oz)   SpO2 99%   BMI 29.90 kg/m²     Temp (24hrs), Av.8 °F (36.6 °C), Min:97.5 °F (36.4 °C), Max:98.1 °F (36.7 °C)       Lines:  Peripheral IV:       Physical Exam:    General:  Alert, cooperative, well nourished, well developed, appears stated age, follows commands   Eyes:  Cloudy, with arcus senilus   Mouth/Throat: Mucous membranes normal, oral pharynx clear   Neck: Supple, no pain with passive ROM   Lungs:   Clear lungs without increased work of breathing   CV:  Regular rate and rhythm, no audible murmur, click, rub or gallop   Abdomen:   Soft, non-tender, bowel sounds active   Extremities: No cyanosis or edema   Skin: Skin color, texture, turgor normal, no rash   Lymph nodes: Cervical and supraclavicular normal   Musculoskeletal: No swelling or deformity   Lines/Devices:  Intact, no erythema, drainage or tenderness   Psych: Alert and cooperative, not oriented to place or time       Data Review:     CBC:  Recent Labs     19  0725 19  0534 19  0514   WBC 5.6 8.2 8.3   GRANS 67 68 70   MONOS 10 12 10   EOS 4 2 1   ANEU 3.8 5.6 5.9   ABL 1.0 1.4 1.5   HGB 9.9* 10.8* 10.3*   HCT 32.1* 35.6* 33.8*    161 187       BMP:  Recent Labs     02/03/19  0534 02/02/19  0514   CREA 1.10* 1.36*   BUN 15 22    148*   K 3.6 3.5   * 114*   CO2 26 26   AGAP 6* 8   GLU 73 71       LFTS:  Recent Labs     02/03/19  0534   TBILI 0.2   ALT 28   SGOT 22   AP 96   TP 6.0*   ALB 2.5*       Microbiology:     All Micro Results     Procedure Component Value Units Date/Time    CULTURE, CSF Kenmami Alexandraots STAIN [[id number]] Collected:  02/01/19 1610    Order Status:  Completed Specimen:  Cerebrospinal Fluid Updated:  02/04/19 0739     Special Requests: NO SPECIAL REQUESTS        GRAM STAIN 0 TO 2 WBCS SEEN PER OIF      NO DEFINITE ORGANISM SEEN        Culture result: NO GROWTH 2 DAYS       CULTURE, URINE [[id number]]  (Abnormal)  (Susceptibility) Collected:  01/30/19 1220    Order Status:  Completed Specimen:  Urine from Clean catch Updated:  02/02/19 1533     Special Requests: NO SPECIAL REQUESTS        Culture result:       >100,000 COLONIES/mL ESCHERICHIA COLI ** (EXTENDED SPECTRUM BETA LACTAMASE ) **                  RESULTS VERIFIED, PHONED TO AND READ BACK BY  Luisito Gomez RN @ 6869 ON 2/2/2019 BY SHARLENE      CRYPTOCOCCAL AG, CSF W/REFLEX TITER [[id number]] Collected:  02/01/19 1610    Order Status:  Completed Specimen:  Serum Updated:  02/01/19 1723     Cryptococcus Ag, CSF NEGATIVE        HSV 1 AND 2 BY PCR [[id number]] Collected:  02/01/19 1635    Order Status:  Canceled Specimen:  Other     MENINGITIS PATHOGENS PANEL, CSF (BY PCR) [[id number]] Collected:  02/01/19 1545    Order Status:  Canceled Specimen:  Cerebrospinal Fluid     CSF HOLD [[id number]]     Order Status:  Sent Specimen:  Cerebrospinal Fluid     CRYPTOCOCCUS AG W/REFLEX TITER [[id number]] Collected:  02/01/19 1530    Order Status:  Canceled Specimen:  Serum from Blood           Imaging:   MRI Brain w wo contrast 2/2/19  Radiologist's IMPRESSION: Old right occipital infarct.   No evidence of acute intracranial  abnormality    Signed By: Nilam Park NP     February 4, 2019

## 2019-02-04 NOTE — PROGRESS NOTES
SPEECH PATHOLOGY NOTE:    Attempted to see patient for diet tolerance, however she is working with another therapy. Will re-attempt at later time/date as schedule permits.        Fely Tripp, [facility], [facility] PERLA KEARNEY, CF-SLP

## 2019-02-04 NOTE — PROGRESS NOTES
Problem: Falls - Risk of  Goal: *Absence of Falls  Document Hood Fall Risk and appropriate interventions in the flowsheet. Outcome: Progressing Towards Goal  Fall Risk Interventions:       Mentation Interventions: Adequate sleep, hydration, pain control, Bed/chair exit alarm, Increase mobility, More frequent rounding, Reorient patient    Medication Interventions: Evaluate medications/consider consulting pharmacy    Elimination Interventions: Bed/chair exit alarm, Call light in reach, Patient to call for help with toileting needs, Toileting schedule/hourly rounds             Problem: Pressure Injury - Risk of  Goal: *Prevention of pressure injury  Document Ashu Scale and appropriate interventions in the flowsheet. Outcome: Progressing Towards Goal  Pressure Injury Interventions:  Sensory Interventions: Assess changes in LOC, Assess need for specialty bed, Discuss PT/OT consult with provider, Minimize linen layers, Pressure redistribution bed/mattress (bed type)    Moisture Interventions: Absorbent underpads, Apply protective barrier, creams and emollients, Contain wound drainage, Internal/External urinary devices, Minimize layers    Activity Interventions: Assess need for specialty bed, Increase time out of bed, Pressure redistribution bed/mattress(bed type)    Mobility Interventions: Assess need for specialty bed, Pressure redistribution bed/mattress (bed type), PT/OT evaluation    Nutrition Interventions: Document food/fluid/supplement intake, Offer support with meals,snacks and hydration    Friction and Shear Interventions: Apply protective barrier, creams and emollients, Feet elevated on foot rest, Foam dressings/transparent film/skin sealants, Lift sheet, Minimize layers               Problem: Nutrition Deficit  Goal: *Optimize nutritional status  Outcome: Progressing Towards Goal  Pt beginning to do well with PO diet.  Asking for more dietary supplements

## 2019-02-04 NOTE — PROGRESS NOTES
Neurology Daily Progress Note     Assessment:     Altered mental status, improved     Low suspicious for meningitis per ID, currently being treated for UTI    Plan:     Treatment of infection per ID    Management of Delirium     Non-pharmacological intervention  · Reorient the patient throughout the day  · Window open and lights on during the day. Lights off, television off, noises down during the night. If able, decrease nursing checks at night  · Therapies as often as possible  · Avoid restraints to the best of your ability   · Avoid sensory deprivation by using glasses and hearing aids, if applicable       Pharmacological intervention  · Replete electrolyte abnormalities and correct metabolic abnormalities  · Limit benzodiazepines, antihistamines, narcotics, anticholinergics. Preference towards Precedex for sedation over fentanyl and benzodiazepines/GABAa agonists. · For dangerous behavior/aggression to self or others can consider Zyprexa or Seroquel if benefits outweigh risk  · For persistent insomnia can use melatonin four hours prior to bedtime or Seroquel 25 mg at bedtime       Subjective: Interval history:    Patient is doing well today. She is awake, alert, oriented x3. No issues noted. History:    Evan Kaminski is a [de-identified] y.o. female who is being seen for AMS. Review of systems negative with exception of pertinent positives and negatives noted above.        Objective:     Vitals:    02/03/19 2028 02/03/19 2330 02/04/19 0430 02/04/19 0849   BP:  120/67 114/61 114/41   Pulse:  84 80 83   Resp:  18 18 18   Temp:  98 °F (36.7 °C) 98.1 °F (36.7 °C) 98.1 °F (36.7 °C)   SpO2: 92% 98% 99% 95%   Weight:       Height:              Current Facility-Administered Medications:     insulin glargine (LANTUS) injection 50 Units, 50 Units, SubCUTAneous, QHS, Herbert Dewitt MD    meropenem (MERREM) 500 mg in 0.9% sodium chloride (MBP/ADV) 50 mL, 0.5 g, IntraVENous, Q8H, Treva Mosley MD, Last Rate: 100 mL/hr at 02/04/19 0954, 500 mg at 02/04/19 0954    LORazepam (ATIVAN) injection 2 mg, 2 mg, IntraVENous, Q4H PRN, Sean Mancini MD, 1 mg at 02/02/19 2333    sodium chloride (NS) flush 5-40 mL, 5-40 mL, IntraVENous, Q8H, Karri Rowe MD, 10 mL at 02/04/19 0545    sodium chloride (NS) flush 5-40 mL, 5-40 mL, IntraVENous, PRN, Karri Rowe MD    aspirin delayed-release tablet 81 mg, 81 mg, Oral, DAILY, Sean Mancini MD, 81 mg at 02/04/19 1616    colchicine tablet 0.6 mg, 0.6 mg, Oral, TID PRN, Sean Mancini MD    pantoprazole (PROTONIX) tablet 40 mg, 40 mg, Oral, ACB, Sean Mancini MD, 40 mg at 02/04/19 8739    rOPINIRole (REQUIP) tablet 0.5 mg, 0.5 mg, Oral, QHS PRN, Sean Mancini MD    rosuvastatin (CRESTOR) tablet 20 mg, 20 mg, Oral, QHS, Sean Mancini MD, 20 mg at 02/03/19 2136    traMADol (ULTRAM) tablet 50 mg, 50 mg, Oral, Q8H PRN, Sean Mancini MD    sodium chloride (NS) flush 5-40 mL, 5-40 mL, IntraVENous, Q8H, Sean Mancini MD, 10 mL at 02/04/19 0545    sodium chloride (NS) flush 5-40 mL, 5-40 mL, IntraVENous, PRN, Sean Mancini MD    acetaminophen (TYLENOL) tablet 650 mg, 650 mg, Oral, Q4H PRN, Sean Mancini MD    HYDROcodone-acetaminophen [facility]) 5-325 mg per tablet 1 Tab, 1 Tab, Oral, Q4H PRN, Sean Mancini MD    naloxone [facility]) injection 0.4 mg, 0.4 mg, IntraVENous, PRN, Sean Mancini MD    diphenhydrAMINE (BENADRYL) capsule 25 mg, 25 mg, Oral, Q4H PRN, Sean Mancini MD    ondansetron [facility]) injection 4 mg, 4 mg, IntraVENous, Q4H PRN, Sean Mancini MD    senna-docusate (PERICOLACE) 8.6-50 mg per tablet 2 Tab, 2 Tab, Oral, DAILY PRN, Sean Mancini MD    LORazepam (ATIVAN) tablet 0.5 mg, 0.5 mg, Oral, Q4H PRN, Sean Mancini MD    zolpidem [facility]) tablet 5 mg, 5 mg, Oral, QHS PRN, Sean Mancini MD    insulin lispro (HUMALOG) injection, , SubCUTAneous, AC&HS, Sheila Gomez MD, 6 Units at 02/04/19 6708    dextrose 40% (GLUTOSE) oral gel 1 Tube, 15 g, Oral, PRN, Sheila Gomez MD    glucagon [facility]) injection 1 mg, 1 mg, IntraMUSCular, PRN, Sheila Gomez MD    dextrose (D50W) injection syrg 12.5-25 g, 25-50 mL, IntraVENous, PRN, Sheila Gomez MD    bisacodyl (DULCOLAX) tablet 5 mg, 5 mg, Oral, DAILY PRN, Sheila Gomez MD    alum-mag hydroxide-[facility]) oral suspension 30 mL, 30 mL, Oral, Q4H PRN, Sheila Gomez MD    hydrALAZINE (APRESOLINE) 20 mg/mL injection 20 mg, 20 mg, IntraVENous, Q4H PRN, Sheila Gomez MD, 20 mg at 01/31/19 0146    Recent Results (from the past 12 hour(s))   GLUCOSE, POC    Collection Time: 02/04/19 12:00 AM   Result Value Ref Range    Glucose (POC) 338 (H) 65 - 100 mg/dL   CBC WITH AUTOMATED DIFF    Collection Time: 02/04/19  7:25 AM   Result Value Ref Range    WBC 5.6 4.3 - 11.1 K/uL    RBC 3.29 (L) 4.05 - 5.2 M/uL    HGB 9.9 (L) 11.7 - 15.4 g/dL    HCT 32.1 (L) 35.8 - 46.3 %    MCV 97.6 79.6 - 97.8 FL    MCH 30.1 26.1 - 32.9 PG    MCHC 30.8 (L) 31.4 - 35.0 g/dL    RDW 14.9 (H) 11.9 - 14.6 %    PLATELET 373 163 - 173 K/uL    MPV 11.8 9.4 - 12.3 FL    ABSOLUTE NRBC 0.00 0.0 - 0.2 K/uL    DF AUTOMATED      NEUTROPHILS 67 43 - 78 %    LYMPHOCYTES 19 13 - 44 %    MONOCYTES 10 4.0 - 12.0 %    EOSINOPHILS 4 0.5 - 7.8 %    BASOPHILS 1 0.0 - 2.0 %    IMMATURE GRANULOCYTES 0 0.0 - 5.0 %    ABS. NEUTROPHILS 3.8 1.7 - 8.2 K/UL    ABS. LYMPHOCYTES 1.0 0.5 - 4.6 K/UL    ABS. MONOCYTES 0.5 0.1 - 1.3 K/UL    ABS. EOSINOPHILS 0.2 0.0 - 0.8 K/UL    ABS. BASOPHILS 0.0 0.0 - 0.2 K/UL    ABS. IMM.  GRANS. 0.0 0.0 - 0.5 K/UL   METABOLIC PANEL, BASIC    Collection Time: 02/04/19  7:25 AM   Result Value Ref Range    Sodium 141 136 - 145 mmol/L    Potassium 3.8 3.5 - 5.1 mmol/L    Chloride 108 (H) 98 - 107 mmol/L    CO2 27 21 - 32 mmol/L    Anion gap 6 (L) 7 - 16 mmol/L    Glucose 276 (H) 65 - 100 mg/dL    BUN 20 8 - 23 MG/DL    Creatinine 1.44 (H) 0.6 - 1.0 MG/DL    GFR est AA 45 (L) >60 ml/min/1.73m2    GFR est non-AA 37 (L) >60 ml/min/1.73m2    Calcium 8.0 (L) 8.3 - 10.4 MG/DL   GLUCOSE, POC    Collection Time: 02/04/19  7:42 AM   Result Value Ref Range    Glucose (POC) 285 (H) 65 - 100 mg/dL         Physical Exam:  General - Well developed, well nourished, in no apparent distress. Pleasant and conversent. HEENT - Normocephalic, atraumatic. Conjunctiva are clear. Neck - Supple without masses  Extremities - Peripheral pulses intact. No edema and no rashes. Neurological examination - Awake, alert, oriented x3. Language and speech are normal. On cranial nerve examination pupils are equal round and reactive to light. Visual fields are full to finger confrontation. Extraocular motility is normal. Face is symmetric and sensation is intact to light touch. Hearing is intact. Motor examination - There is normal muscle tone and bulk. Power is full throughout. Muscle stretch reflexes are normoactive and there are no pathological reflexes present. Sensation is intact to light touch, pinprick, vibration and proprioception in all extremities.  Cerebellar examination is normal.     Signed By: Jesica Delcid NP     February 4, 2019

## 2019-02-04 NOTE — PROGRESS NOTES
Hospitalist Progress Note     Admit Date:  2019 10:03 AM   Name:  Ceci Toussaint   Age:  [de-identified] y.o.  :  1940   MRN:  [id number]   PCP:  Cristopher Wong MD  Treatment Team: Attending Provider: Jaime Atkins MD; Utilization Review: Risa Nash RN; Primary Nurse: Selina Campos; Care Manager: Edwardo Tinoco RUT; Consulting Provider: Osei Sanford MD; Consulting Provider: Jason Dockery MD; Speech Language Pathologist: Sergio Whelan    Subjective:   Pt is an [de-identified] y/o F with DM, HTN, recurrent UTIs on suppressive macrobid, who presented from home with altered mental status. Was severely altered and could not respond or make eye contact. She has a caretaker who helps her in the morning but otherwise lives alone. Was admitted with UTI and AoCKD. Started on rocephin, IVF. Zosyn was ordered due to failure to improve and recurrent low grade fever. Was extremely lethargic so neuro consulted on . Concern for meningitis so CSF obtained which had glc<1. Pt started on vanc/rocephin/amp/acyclovir after CSF obtained. MRI unremarkable. EEG showed severe encephalopathy. ID consulted - lower suspicion for meningitis. Her mental status vastly improved on . Pt reports she is doing well today. Wanting to go home. Denies CP, SOB, N/V.     Objective:     Patient Vitals for the past 24 hrs:   Temp Pulse Resp BP SpO2   19 0430 98.1 °F (36.7 °C) 80 18 114/61 99 %   19 2330 98 °F (36.7 °C) 84 18 120/67 98 %   19 -- -- -- -- 92 %   19 1932 98.1 °F (36.7 °C) 80 18 110/56 98 %   19 1514 97.5 °F (36.4 °C) 66 20 122/72 99 %   19 1039 97.5 °F (36.4 °C) (!) 49 20 134/71 100 %   19 0930 -- (!) 50 -- -- 99 %     Oxygen Therapy  O2 Sat (%): 99 % (19 0430)  Pulse via Oximetry: 80 beats per minute (19)  O2 Device: Room air (19)  O2 Flow Rate (L/min): 2.5 l/min (19 0930)    Intake/Output Summary (Last 24 hours) at 2/4/2019 0840  Last data filed at 2/3/2019 1710  Gross per 24 hour   Intake 360 ml   Output 550 ml   Net -190 ml       *Note that automatically entered I/Os may not be accurate; dependent on patient compliance with collection and accurate  by assistants. Physical Exam:  General:    Well nourished. Alert. No distress  CV:   Douglas Snuffer, reg. No m/r/g. Lungs:  CTAB. No wheezing, rhonchi, or rales. Extremities: Warm and dry. No cyanosis or edema. Skin:     No rashes or jaundice. Normal coloration  Neuro:  Awake, alert, oriented x 3    I reviewed the labs, imaging, EKGs, telemetry, and other studies done this admission. Data Review:   Recent Results (from the past 24 hour(s))   GLUCOSE, POC    Collection Time: 02/03/19 10:37 AM   Result Value Ref Range    Glucose (POC) 130 (H) 65 - 100 mg/dL   GLUCOSE, POC    Collection Time: 02/03/19  4:04 PM   Result Value Ref Range    Glucose (POC) 209 (H) 65 - 100 mg/dL   GLUCOSE, POC    Collection Time: 02/03/19  9:33 PM   Result Value Ref Range    Glucose (POC) 383 (H) 65 - 100 mg/dL   GLUCOSE, POC    Collection Time: 02/04/19 12:00 AM   Result Value Ref Range    Glucose (POC) 338 (H) 65 - 100 mg/dL   CBC WITH AUTOMATED DIFF    Collection Time: 02/04/19  7:25 AM   Result Value Ref Range    WBC 5.6 4.3 - 11.1 K/uL    RBC 3.29 (L) 4.05 - 5.2 M/uL    HGB 9.9 (L) 11.7 - 15.4 g/dL    HCT 32.1 (L) 35.8 - 46.3 %    MCV 97.6 79.6 - 97.8 FL    MCH 30.1 26.1 - 32.9 PG    MCHC 30.8 (L) 31.4 - 35.0 g/dL    RDW 14.9 (H) 11.9 - 14.6 %    PLATELET 457 544 - 020 K/uL    MPV 11.8 9.4 - 12.3 FL    ABSOLUTE NRBC 0.00 0.0 - 0.2 K/uL    DF AUTOMATED      NEUTROPHILS 67 43 - 78 %    LYMPHOCYTES 19 13 - 44 %    MONOCYTES 10 4.0 - 12.0 %    EOSINOPHILS 4 0.5 - 7.8 %    BASOPHILS 1 0.0 - 2.0 %    IMMATURE GRANULOCYTES 0 0.0 - 5.0 %    ABS. NEUTROPHILS 3.8 1.7 - 8.2 K/UL    ABS. LYMPHOCYTES 1.0 0.5 - 4.6 K/UL    ABS. MONOCYTES 0.5 0.1 - 1.3 K/UL    ABS.  EOSINOPHILS 0.2 0.0 - 0.8 K/UL    ABS. BASOPHILS 0.0 0.0 - 0.2 K/UL    ABS. IMM.  GRANS. 0.0 0.0 - 0.5 K/UL   GLUCOSE, POC    Collection Time: 02/04/19  7:42 AM   Result Value Ref Range    Glucose (POC) 285 (H) 65 - 100 mg/dL       All Micro Results     Procedure Component Value Units Date/Time    CULTURE, CSF Jossue Nick STAIN [[id number]] Collected:  02/01/19 1610    Order Status:  Completed Specimen:  Cerebrospinal Fluid Updated:  02/04/19 0739     Special Requests: NO SPECIAL REQUESTS        GRAM STAIN 0 TO 2 WBCS SEEN PER OIF      NO DEFINITE ORGANISM SEEN        Culture result: NO GROWTH 2 DAYS       CULTURE, URINE [[id number]]  (Abnormal)  (Susceptibility) Collected:  01/30/19 1220    Order Status:  Completed Specimen:  Urine from Clean catch Updated:  02/02/19 1533     Special Requests: NO SPECIAL REQUESTS        Culture result:       >100,000 COLONIES/mL ESCHERICHIA COLI ** (EXTENDED SPECTRUM BETA LACTAMASE ) **                  RESULTS VERIFIED, PHONED TO AND READ BACK BY  Zachary Frye, RN @ 0545 ON 2/2/2019 BY M      CRYPTOCOCCAL AG, CSF W/REFLEX TITER [[id number]] Collected:  02/01/19 1610    Order Status:  Completed Specimen:  Serum Updated:  02/01/19 1723     Cryptococcus Ag, CSF NEGATIVE        HSV 1 AND 2 BY PCR [[id number]] Collected:  02/01/19 1635    Order Status:  Canceled Specimen:  Other     MENINGITIS PATHOGENS PANEL, CSF (BY PCR) [[id number]] Collected:  02/01/19 1545    Order Status:  Canceled Specimen:  Cerebrospinal Fluid     CSF HOLD [[id number]]     Order Status:  Sent Specimen:  Cerebrospinal Fluid     CRYPTOCOCCUS AG W/REFLEX TITER [[id number]] Collected:  02/01/19 1530    Order Status:  Canceled Specimen:  Serum from Blood           Current Facility-Administered Medications   Medication Dose Route Frequency    insulin glargine (LANTUS) injection 45 Units  45 Units SubCUTAneous QHS    meropenem (MERREM) 500 mg in 0.9% sodium chloride (MBP/ADV) 50 mL  0.5 g IntraVENous Q8H    LORazepam (ATIVAN) injection 2 mg  2 mg IntraVENous Q4H PRN    acyclovir (ZOVIRAX) 640 mg in 0.9% sodium chloride 100 mL IVPB  640 mg IntraVENous Q12H    sodium chloride (NS) flush 5-40 mL  5-40 mL IntraVENous Q8H    sodium chloride (NS) flush 5-40 mL  5-40 mL IntraVENous PRN    aspirin delayed-release tablet 81 mg  81 mg Oral DAILY    colchicine tablet 0.6 mg  0.6 mg Oral TID PRN    pantoprazole (PROTONIX) tablet 40 mg  40 mg Oral ACB    rOPINIRole (REQUIP) tablet 0.5 mg  0.5 mg Oral QHS PRN    rosuvastatin (CRESTOR) tablet 20 mg  20 mg Oral QHS    traMADol (ULTRAM) tablet 50 mg  50 mg Oral Q8H PRN    sodium chloride (NS) flush 5-40 mL  5-40 mL IntraVENous Q8H    sodium chloride (NS) flush 5-40 mL  5-40 mL IntraVENous PRN    acetaminophen (TYLENOL) tablet 650 mg  650 mg Oral Q4H PRN    HYDROcodone-acetaminophen (NORCO) 5-325 mg per tablet 1 Tab  1 Tab Oral Q4H PRN    naloxone (NARCAN) injection 0.4 mg  0.4 mg IntraVENous PRN    diphenhydrAMINE (BENADRYL) capsule 25 mg  25 mg Oral Q4H PRN    ondansetron (ZOFRAN) injection 4 mg  4 mg IntraVENous Q4H PRN    senna-docusate (PERICOLACE) 8.6-50 mg per tablet 2 Tab  2 Tab Oral DAILY PRN    LORazepam (ATIVAN) tablet 0.5 mg  0.5 mg Oral Q4H PRN    zolpidem (AMBIEN) tablet 5 mg  5 mg Oral QHS PRN    insulin lispro (HUMALOG) injection   SubCUTAneous AC&HS    dextrose 40% (GLUTOSE) oral gel 1 Tube  15 g Oral PRN    glucagon (GLUCAGEN) injection 1 mg  1 mg IntraMUSCular PRN    dextrose (D50W) injection syrg 12.5-25 g  25-50 mL IntraVENous PRN    bisacodyl (DULCOLAX) tablet 5 mg  5 mg Oral DAILY PRN    alum-mag hydroxide-simeth (MYLANTA) oral suspension 30 mL  30 mL Oral Q4H PRN    hydrALAZINE (APRESOLINE) 20 mg/mL injection 20 mg  20 mg IntraVENous Q4H PRN       Other Studies:  No results found.     Assessment and Plan:     Hospital Problems as of 2/4/2019 Date Reviewed: 1/30/2019          Codes Class Noted - Resolved POA    Meningitis ICD-10-CM: G03.9  ICD-9-CM: 322.9  2/3/2019 - Present Yes        Seizure-like activity ([facility] ICD-10-CM: R56.9  ICD-9-CM: 780.39  2/1/2019 - Present Yes        Hyperglycemia due to type 2 diabetes mellitus ([facility] ICD-10-CM: E11.65  ICD-9-CM: 250.00  1/30/2019 - Present Yes        * (Principal) Acute metabolic encephalopathy [facility]-00-NE: G93.41  ICD-9-CM: 348.31  1/30/2019 - Present Yes        Acute cystitis without hematuria ICD-10-CM: N30.00  ICD-9-CM: 595.0  1/30/2019 - Present Yes        Uncontrolled type 2 diabetes mellitus with hyperglycemia (HCC) (Chronic) ICD-10-CM: E11.65  ICD-9-CM: 250.02  6/15/2015 - Present Yes        Essential hypertension (Chronic) ICD-10-CM: I10  ICD-9-CM: 401.9  6/15/2015 - Present Yes        Chronic kidney disease, stage III (moderate) (HCC) (Chronic) ICD-10-CM: N18.3  ICD-9-CM: 585.3  6/15/2015 - Present Yes        RESOLVED: Acute renal failure superimposed on stage 3 chronic kidney disease ([facility] ICD-10-CM: N17.9, N18.3  ICD-9-CM: 584.9, 585.3  1/31/2019 - 2/3/2019 Yes              Acute metabolic encephalopathy  Lower suspicion for meningitis. Likely secondary to UTI. -mental status vastly improved vs admission  -MRI no acute abnormalities  -abx changed to merrem on 2/3 per ID  -follow CSF and other cultures  -SLP diet mech soft  -neuro and ID consulted, appreciate help  - still on acyclovir - defer to ID    MDR E coli in Urine  -Merrem per ID    Possible seizure activity  -monitor for recurrence  -PRN ativan    HyperNa  -resolved    DM2 with hyperglycemia  -increase lantus to 50 units  -ISS    HTN  -holding home HCTZ, losartan, lasix as BP well controlled. -stop metoprolol today due to bradycardia.   - PRN hydralazine IV    AoCKD  -at baseline now    Discharge planning:  CM says pt has SNF bed, awaiting clearance from ID    Signed:  Sara Washburn MD

## 2019-02-04 NOTE — PROGRESS NOTES
Pt showing marked improvement on new abx. AO x 4 and describing her hallucinations as bugs, spiders and \"something large crawling on the wall. \" Reassured that abx would help. Will contnue to monitor. Hourly rounds performed through shift, pt denies needs at this time. Bed in low position and call light/ personal items within reach. Will continue to monitor and report to oncoming nurse.

## 2019-02-04 NOTE — PROGRESS NOTES
Problem: Dysphagia (Adult)  Goal: *Acute Goals and Plan of Care (Insert Text)  STG: Pt will demonstrate zero signs/sx of aspiration with mechanical soft textures with thin liquids with no straws with 90% accuracy during all meals. STG: Pt will complete a full speech, language and cognitive evaluation without assistance with 100% accuracy during session. Goal Met 2/4/19  STG: Pt will complete a Modified barium swallow study with zero signs/sx of aspiration  with 100% accuracy during swallow study. LTG: Pt will demonstrate zero signs/sx of aspiration with least restrictive diet with 100% accuracy during all meals. LTG: Pt will increase cognitive linguistic function to improve communication across environments   STG:Patient will repeat at phrase level with 80% accuracy given minimal cueing  STG: Patient will complete naming tasks (divergent, convergent) with 80% accuracy given moderate cueing  STG: Patient will recall items with delay with 80% accuracy given minimal cueing  STG: Patient will complete basic problem solving tasks with 70% accuracy given moderate cueing   STG: Patient will participate in further evaluation of cognitive linguistic function for therapeutic assessment   STG: Patient will complete visual problem solving/reasoning tasks with 75% accuracy given moderate cueing     Speech language pathology: bedside swallow and speech language note: Daily Note 1    NAME/AGE/GENDER: Edith Mora is a [de-identified] y.o. female  DATE: 2/4/2019  PRIMARY DIAGNOSIS: Hyperglycemia due to type 2 diabetes mellitus ([facility]  Acute metabolic encephalopathy  Procedure(s) (LRB):  MISC ANESTHESIA (N/A) 2 Days Post-Op  ICD-10: Treatment Diagnosis: R13.12 oropharyngeal phase dysphagia, Cognitive communication deficit R41.841  INTERDISCIPLINARY COLLABORATION: Registered NurseASSESSMENT: Ms. Lilo Ernandez presents with mild oral dysphagia and moderate-severe cognitive linguistic deficits.   Patient will benefit from skilled intervention to address the below impairments. Swallowing Results: Presented with thin liquid via cup and straw, puree, mixed, and solid consistencies. Mildly prolonged mastication of chewable trials. Timely swallow initiation, and single swallows upon palpation. Adequate oral clearing. No overt signs or symptoms of airway compromise observed with liquid or solid textures. Per daughter, patient having some difficulty coordinating lingual movements while chewing and wishes to continue with mechanical soft diet for now. Did not see this during trials today. Recommend continue mechanical soft diet/thin liquids. Will follow up x1 for advancement. Speech Language-Cognitive Results: Completed [facility] Cognitive Evaluation ([address] [place] [address], [place]). Overall score of 14/30 (normal=26/30)   Visuospatial/Executive: 0/5   Naming: 3/3   Memory: Immediate-4/5, Delayed-3/5   Attention: 1/6 (impaird digit reversal;false positives;impaired subtraction)   Language: 0/3    Abstraction: 0/2   Orientation: 6/6  Strengths include naming and orientation. Informally, answered yes/no questions correctly. Visual impairments at baseline , however patient able to participate in visual portions. Did not attend to any of the numbers on trial following task, jennifer a square for cube copy task, and impaired contour, numbers, and hands on clock drawing with poor awareness. Improved insight with clinician identifying errors, but unable to correct. For memory, patient required 5 trials to recall all 5 items immediately. Able to recall 5/5 items with multiple choice, no improvement with category cue. Impaired sentence repetition despite multiple attempts and only able to name 2 items to concrete category (normal=11). Attention impaired, even when broken down by clinician unable to complete 1 step simple subtraction problems. ? Comprehension as patient required repetition of directions/task on a few occasions.   Patient currently functioning below baseline and will benefit from ongoing skilled intervention for cognitive linguistic function. ?????? ? ? This section established at most recent assessment??????????  PROBLEM LIST (Impairments causing functional limitations):  1. Dysphagia   2. cognition  REHABILITATION POTENTIAL FOR STATED GOALS: Good  PLAN OF CARE:   Patient will benefit from skilled intervention to address the following impairments. RECOMMENDATIONS AND PLANNED INTERVENTIONS (Benefits and precautions of therapy have been discussed with the patient.):  · continue prescribed diet  MEDICATIONS:  · Crushed in puree  · With liquid  COMPENSATORY STRATEGIES/MODIFICATIONS INCLUDING:  · Small sips and bites  OTHER RECOMMENDATIONS (including follow up treatment recommendations): · Family training/education  · Patient education  RECOMMENDED DIET MODIFICATIONS DISCUSSED WITH:  · Nursing  · Family  · Patient  FREQUENCY/DURATION: Continue to follow patient 2 times a week for duration of hospital stay to address above goals. RECOMMENDED REHABILITATION/EQUIPMENT: (at time of discharge pending progress): Due to the probability of continued deficits (see above) this patient will likely need continued skilled speech therapy after discharge. SUBJECTIVE:   Oriented x4. Family at bedside. History of Present Injury/Illness: Ms. Nicolas Kirby  has a past medical history of CAD (coronary artery disease), DM2 (diabetes mellitus, type 2) ([facility] Gout, HLD (hyperlipidemia), HTN (hypertension), and Peripheral neuropathy. .  She also  has a past surgical history that includes pr cardiac surg procedure unlist; hx cholecystectomy; hx tubal ligation; and hx coronary artery bypass graft. Present Symptoms: dysphagia, cognition    Pain Intensity 1: 0  Current Medications:   No current facility-administered medications on file prior to encounter. Current Outpatient Medications on File Prior to Encounter   Medication Sig Dispense Refill    nitrofurantoin (MACRODANTIN) 50 mg capsule Take 1 Cap by mouth nightly.  30 Cap 3  insulin glargine (LANTUS SOLOSTAR) 100 unit/mL (3 mL) pen 34 Units by SubCUTAneous route nightly. 1 Package 5    insulin lispro (HUMALOG KWIKPEN) 100 unit/mL kwikpen 24 Units SubQ with Breakfast; 24 Units SubQ with Lunch; 24 Units SubQ with Dinner. 1 Package 5    hydrochlorothiazide (HYDRODIURIL) 25 mg tablet Take 1 Tab by mouth daily. 90 Tab 1    metoprolol (LOPRESSOR) 25 mg tablet Take 1 Tab by mouth two (2) times a day. 60 Tab 5    Blood-Glucose Meter (ONETOUCH ULTRAMINI) monitoring kit Use as directed 1 Kit 0    glucose blood VI test strips (ONETOUCH ULTRA TEST) strip Test 4 times daily as directed 100 Strip 11    Lancets (ONETOUCH ULTRASOFT LANCETS) misc Test 4 times daily as directed 100 Each 11    losartan (COZAAR) 100 mg tablet Take  by mouth daily.  furosemide (LASIX) 20 mg tablet Take  by mouth two (2) times a day.  Omeprazole delayed release (PRILOSEC D/R) 20 mg tablet Take 1 Tab by mouth daily. 30 Tab 5    rosuvastatin (CRESTOR) 20 mg tablet Take 1 Tab by mouth nightly. 90 Tab 1    gabapentin (NEURONTIN) 100 mg capsule Take 1 Cap by mouth three (3) times daily. 30 Cap 5    colchicine (COLCRYS) 0.6 mg tablet Take 0.6 mg by mouth three (3) times daily as needed.  traMADol (ULTRAM) 50 mg tablet Take 1 Tab by mouth every eight (8) hours as needed for Pain. 90 Tab 5    aspirin delayed-release 81 mg tablet Take  by mouth daily.  rOPINIRole (REQUIP) 0.5 mg tablet Take 1 Tab by mouth nightly as needed.  90 Tab 1     Current Dietary Status:  Mechanical soft/Thin         Social History/Home Situation:    Home Environment: Apartment  One/Two Story Residence: One story  Living Alone: Yes  Support Systems: Family member(s), Friends \ neighbors  Patient Expects to be Discharged to[de-identified] Rehabilitation facility  Current DME Used/Available at Home: Walker, rolling, Cane, straight, Wheelchair  OBJECTIVE:   Respiratory Status:  Room air       Oral Motor Structure/Speech:  Oral-Motor Structure/Motor Speech  Labial: Decreased rate  Oral Hygiene: adequate  Lingual: Incoordinated    Cognitive and Communication Status:  Neurologic State: Alert  Orientation Level: Oriented X4  Cognition: Follows commands  Perception: Appears intact  Perseveration: No perseveration noted  Safety/Judgement: Awareness of environment    BEDSIDE SWALLOW EVALUATION  Oral Assessment:  Oral Assessment  Oral Hygiene: adequate  Lingual: Incoordinated  P.O. Trials: The patient was given the following:           ORAL PHASE:  Bolus Acceptance: No impairment  Bolus Formation/Control: Impaired  Propulsion: No impairment  Type of Impairment: Mastication  Oral Residue: Less than 10% of bolus    PHARYNGEAL PHASE:  Initiation of Swallow: No impairment  Laryngeal Elevation: Functional  Aspiration Signs/Symptoms: None              Pharyngeal Phase Characteristics: No impairment, issues, or problems     OTHER OBSERVATIONS:  Rate/bite size: WNL  Comments:   Endurance: Questionable  Comments:     SPEECH-LANGUAGE COGNITIVE EVALUATION  Tests Given:[facility] Cognitive Evaluation     Motor Speech:  Oral-Motor Structure/Motor Speech  Oral Hygiene: adequate  Lingual: Incoordinated    Auditory Comprehension:   Auditory Comprehension  Auditory Impairment: Yes  Cueing type: Repetition    Reading Comprehension:        Verbal Expression:   Verbal Expression  Automatic Speech Task: No impairment  Repetition: Impaired  Sentence Completion: Impaired  Sentence level (%): (0/2)    Written Expression:        Neuro-Linguistics:      Attention: Impaired   Abstraction: Impaired   Memory:    Immediate: Impaired   Delayed: Impaired                    Pragmatics:          Voice:                                                 Dysphagia Activities: Activities/Procedures listed utilized to improve progress in swallow strength, swallow timeliness, swallow function, diet tolerance and swallow safety.  Required no cueing to improve swallow safety, work toward diet advancement and decrease aspiration risk. Assessment/Reassessment only, no treatment provided today    Tool Used: Dysphagia Outcome and Severity Scale (ELKE)    Score Comments   Normal Diet  [] 7 With no strategies or extra time needed       Functional Swallow  [] 6 May have mild oral or pharyngeal delay       Mild Dysphagia    [] 5 Which may require one diet consistency restricted (those who demonstrate penetration which is entirely cleared on MBS would be included)   Mild-Moderate Dysphagia  [x] 4 With 1-2 diet consistencies restricted       Moderate Dysphagia  [] 3 With 2 or more diet consistencies restricted       Moderately Severe Dysphagia  [] 2 With partial PO strategies (trials with ST only)       Severe Dysphagia  [] 1 With inability to tolerate any PO safely          Score:  Initial: 4 Most Recent: 5 (Date: 2/4/19 )   Interpretation of Tool: The Dysphagia Outcome and Severity Scale (ELKE) is a simple, easy-to-use, 7-point scale developed to systematically rate the functional severity of dysphagia based on objective assessment and make recommendations for diet level, independence level, and type of nutrition. Score 7 6 5 4 3 2 1   Modifier CH CI CJ CK CL CM CN     Payor: [facility] MEDICARE / Plan: [address] / Product Type: [employer] /     ________________________________________________________________________________________________    Safety:   After treatment position/precautions:  · Up in chair  · Call light within reach  · RN notified  · Visitors at bedside  Treatment Assessment:  participated in dysphagia treatment and cognitive evaluation as outlined above. Progression/Medical Necessity:   · Patient demonstrates good rehab potential due to higher previous functional level.   · Skilled intervention continues to be required due to decreased communication with family/caregivers, decreased ability to follow commands, decreased cognitive skills and decreased independence with activities of daily living. Compliance with Program/Exercises: Will assess as treatment progresses   Reason for Continuation of Services/Other Comments:  · Patient continues to require skilled intervention due to modified diet. · Patient continues to require skilled intervention due to cognitive impairments. Recommendations/Intent for next treatment session: \"Treatment next visit will focus on solid trials to determine diet advacement and cognitive linguitic treatment\".     Total Treatment Duration:  Time In: 1244  Time Out: 2230 Han Anderson, INST MEDICO DEL [facility], [facility] CASIMIRO KEARNEY, CF-SLP

## 2019-02-04 NOTE — PROGRESS NOTES
Problem: Mobility Impaired (Adult and Pediatric)  Goal: *Acute Goals and Plan of Care (Insert Text)  1. Ms. Brandy Collet will perform supine to sit and sit to supine with contact guard in 7 days. 2.  Ms. Brandy Collet will sit edge of bed unsupported in 7 days. 3.  Ms. Brandy Collet will perform sit to stand and bed to chair with min assist in 7 days. GOAL MET 2/4/2019  4. Ms. Brandy Collet will perform therex to bilateral lower extremities x 25 reps in 7 days. New goal added 2/4/19:  5. Pt will ambulate for 150+ with LRD with MIN A within 7 treatment days. PHYSICAL THERAPY: Daily Note and AM 2/4/2019  INPATIENT: PT Visit Days : 2  Payor: Jay Palmer / Plan: [address] / Product Type: [employer] /       NAME/AGE/GENDER: Timbo Lozano is a [de-identified] y.o. female   PRIMARY DIAGNOSIS: Hyperglycemia due to type 2 diabetes mellitus ([facility] [address]  Acute metabolic encephalopathy Acute metabolic encephalopathy Acute metabolic encephalopathy  Procedure(s) (LRB):  MISC ANESTHESIA (N/A)  2 Days Post-Op  ICD-10: Treatment Diagnosis:    · Generalized Muscle Weakness (M62.81)  · Difficulty in walking, Not elsewhere classified (R26.2)   Precaution/Allergies:  Sulfa (sulfonamide antibiotics)      ASSESSMENT:     Ms. Brandy Collet presents supine on arrival, improved mentation this date. Pt more alert and oriented this date, required less assist with overall mobility. Pt seated to EOB with MIN A, noted to have BM out brief and up back. PCT entered to assist with cleaning, pt performed rolling to L and R with CGA to [facility]. Once cleaned and brief changed, pt LAURA to sit to EOB. Pt demo good static sitting balance at EOB, performed sit to stand with MIN A. Pt ambulated few steps with RW bed to chair with MIN to MOD A, pt unsteady with gait, increased trunk flexion, cues required for walker safety and posture. Pt overall doing well today and making progress toward goals, met goal #3 this date.  PT added ambulation goal, will continue to follow for acute care needs. PT most likely will need further skilled PT services at discharge. This section established at most recent assessment   PROBLEM LIST (Impairments causing functional limitations):  1. Decreased Strength  2. Decreased Transfer Abilities  3. Decreased Ambulation Ability/Technique  4. Decreased Activity Tolerance   INTERVENTIONS PLANNED: (Benefits and precautions of physical therapy have been discussed with the patient.)  1. Bed Mobility  2. Gait Training  3. Therapeutic Activites  4. Therapeutic Exercise/Strengthening  5. Transfer Training     TREATMENT PLAN: Frequency/Duration: 4 times a week for duration of hospital stay  Rehabilitation Potential For Stated Goals: Good     RECOMMENDED REHABILITATION/EQUIPMENT: (at time of discharge pending progress): Due to the probability of continued deficits (see above) this patient will likely need continued skilled physical therapy after discharge. Equipment:    to be determined. HISTORY:   History of Present Injury/Illness (Reason for Referral):  Pt is an [de-identified] y/o F with DM, HTN, recurrent UTIs on suppressive macrobid, who presented from home with altered mental status. She lives alone but has a caretaker who comes in the mornings to help her, but does not stay the whole day. Her daughter says the last time she was normal was on Monday morning; at that time they spoke on the phone and she sounded fine. However she reports that the caretaker had difficulty getting her out of bed and she was diffusely weak. On Tuesday she was altered and her speech was confusing to the daughter; thought process was disorganized and she rambled. Today she had significant decline and will not even talk to me in the exam room or make eye contact.   Family says she has not voluntarily complained of any symptoms such as fevers, pain, dysuria but they did not ask her       Past Medical History/Comorbidities:   Ms. Ric Person  has a past medical history of CAD (coronary artery disease), DM2 (diabetes mellitus, type 2) ([address] Gout, HLD (hyperlipidemia), HTN (hypertension), and Peripheral neuropathy. Ms. Silvino Tavera  has a past surgical history that includes pr cardiac surg procedure unlist; hx cholecystectomy; hx tubal ligation; and hx coronary artery bypass graft. Social History/Living Environment:   Home Environment: Apartment  One/Two Story Residence: One story  Living Alone: Yes  Support Systems: Family member(s), Friends \ neighbors  Patient Expects to be Discharged to[de-identified] Rehabilitation facility  Current DME Used/Available at Home: Walker, rolling, Cane, straight, Wheelchair  Prior Level of Function/Work/Activity:  Independent with rolling walker . age   Number of Personal Factors/Comorbidities that affect the Plan of Care: 1-2: MODERATE COMPLEXITY   EXAMINATION:   Most Recent Physical Functioning:   Gross Assessment:                  Posture:  Posture Assessment: Forward head, Rounded shoulders  Balance:  Sitting: Impaired  Sitting - Static: Good (unsupported)  Sitting - Dynamic: Fair (occasional)  Standing: Impaired  Standing - Static: Constant support; Fair  Standing - Dynamic : Poor Bed Mobility:  Rolling: Contact guard assistance;Minimum assistance  Supine to Sit: Minimum assistance  Sit to Supine: (left up in chair)  Scooting: Minimum assistance  Wheelchair Mobility:     Transfers:  Sit to Stand: Minimum assistance  Stand to Sit: Minimum assistance  Bed to Chair: Minimum assistance  Gait:     Base of Support: Widened  Speed/Noemi: Slow  Step Length: Left shortened;Right shortened  Gait Abnormalities: Decreased step clearance;Shuffling gait; Steppage gait  Distance (ft): 2 Feet (ft)(bed to chair)  Assistive Device: Gait belt;Walker, rolling  Ambulation - Level of Assistance: Minimal assistance  Interventions: Safety awareness training;Verbal cues      Body Structures Involved:  1. Muscles Body Functions Affected:  1.  Movement Related Activities and Participation Affected:  1. Mobility   Number of elements that affect the Plan of Care: 3: MODERATE COMPLEXITY   CLINICAL PRESENTATION:   Presentation: Evolving clinical presentation with changing clinical characteristics: MODERATE COMPLEXITY   CLINICAL DECISION MAKIN [facility] Inpatient Short Form  How much difficulty does the patient currently have. .. Unable A Lot A Little None   1. Turning over in bed (including adjusting bedclothes, sheets and blankets)? [] 1   [x] 2   [] 3   [] 4   2. Sitting down on and standing up from a chair with arms ( e.g., wheelchair, bedside commode, etc.)   [] 1   [x] 2   [] 3   [] 4   3. Moving from lying on back to sitting on the side of the bed? [] 1   [x] 2   [] 3   [] 4   How much help from another person does the patient currently need. .. Total A Lot A Little None   4. Moving to and from a bed to a chair (including a wheelchair)? [x] 1   [] 2   [] 3   [] 4   5. Need to walk in hospital room? [x] 1   [] 2   [] 3   [] 4   6. Climbing 3-5 steps with a railing? [x] 1   [] 2   [] 3   [] 4   © , Trustees of [facility] [employer], under license to [employer]. All rights reserved      Score:  Initial: 9 Most Recent: X (Date: -- )    Interpretation of Tool:  Represents activities that are increasingly more difficult (i.e. Bed mobility, Transfers, Gait). Medical Necessity:     · Patient is expected to demonstrate progress in functional technique to decrease assistance required with mobility and gait. .  Reason for Services/Other Comments:  · Patient continues to require present interventions due to patient's inability to function at baseline.  .   Use of outcome tool(s) and clinical judgement create a POC that gives a: Questionable prediction of patient's progress: MODERATE COMPLEXITY            TREATMENT:   (In addition to Assessment/Re-Assessment sessions the following treatments were rendered) Pre-treatment Symptoms/Complaints:  \"I am doing good\"  Pain: Initial:   Pain Intensity 1: 0  Post Session:  0/10 post session     Therapeutic Activity: (    25 min): Therapeutic activities including Bed transfers, sit to stand transfers, ambulation on level ground, walker safety, balance, posture to improve mobility, strength, balance and coordination. Required min to moderate Safety awareness training;Verbal cues to promote motor control of bilateral, upper extremity(s), lower extremity(s). Braces/Orthotics/Lines/Etc:   · IV  · O2 Device: Room air  Treatment/Session Assessment:    · Response to Treatment:  Improved mobility, more alert  · Interdisciplinary Collaboration:   o Registered Nurse  · After treatment position/precautions:   o Up in chair  o Bed/Chair-wheels locked  o Bed in low position  o Call light within reach  o Family at bedside  o PCT in room   · Compliance with Program/Exercises: Will assess as treatment progresses  · Recommendations/Intent for next treatment session: \"Next visit will focus on advancements to more challenging activities and reduction in assistance provided\".   Total Treatment Duration:  PT Patient Time In/Time Out  Time In: 1116  Time Out: [facility] , PT

## 2019-02-05 LAB
GLUCOSE BLD STRIP.AUTO-MCNC: 120 MG/DL (ref 65–100)
GLUCOSE BLD STRIP.AUTO-MCNC: 199 MG/DL (ref 65–100)
GLUCOSE BLD STRIP.AUTO-MCNC: 374 MG/DL (ref 65–100)
GLUCOSE BLD STRIP.AUTO-MCNC: 400 MG/DL (ref 65–100)

## 2019-02-05 PROCEDURE — 97530 THERAPEUTIC ACTIVITIES: CPT

## 2019-02-05 PROCEDURE — 74011636637 HC RX REV CODE- 636/637: Performed by: INTERNAL MEDICINE

## 2019-02-05 PROCEDURE — 82962 GLUCOSE BLOOD TEST: CPT

## 2019-02-05 PROCEDURE — 74011250636 HC RX REV CODE- 250/636: Performed by: INTERNAL MEDICINE

## 2019-02-05 PROCEDURE — 77030020256 HC SOL INJ NACL 0.9%  500ML

## 2019-02-05 PROCEDURE — 74011250637 HC RX REV CODE- 250/637: Performed by: INTERNAL MEDICINE

## 2019-02-05 PROCEDURE — 74011000258 HC RX REV CODE- 258: Performed by: INTERNAL MEDICINE

## 2019-02-05 PROCEDURE — 97110 THERAPEUTIC EXERCISES: CPT

## 2019-02-05 PROCEDURE — 65270000029 HC RM PRIVATE

## 2019-02-05 RX ORDER — INSULIN LISPRO 100 [IU]/ML
5 INJECTION, SOLUTION INTRAVENOUS; SUBCUTANEOUS ONCE
Status: COMPLETED | OUTPATIENT
Start: 2019-02-05 | End: 2019-02-05

## 2019-02-05 RX ORDER — LOSARTAN POTASSIUM 50 MG/1
50 TABLET ORAL DAILY
Status: DISCONTINUED | OUTPATIENT
Start: 2019-02-05 | End: 2019-02-06 | Stop reason: HOSPADM

## 2019-02-05 RX ADMIN — Medication 10 ML: at 05:49

## 2019-02-05 RX ADMIN — Medication 10 ML: at 14:00

## 2019-02-05 RX ADMIN — LOSARTAN POTASSIUM 50 MG: 50 TABLET ORAL at 18:20

## 2019-02-05 RX ADMIN — INSULIN LISPRO 5 UNITS: 100 INJECTION, SOLUTION INTRAVENOUS; SUBCUTANEOUS at 18:20

## 2019-02-05 RX ADMIN — MEROPENEM 500 MG: 500 INJECTION, POWDER, FOR SOLUTION INTRAVENOUS at 18:20

## 2019-02-05 RX ADMIN — PANTOPRAZOLE SODIUM 40 MG: 40 TABLET, DELAYED RELEASE ORAL at 05:48

## 2019-02-05 RX ADMIN — ASPIRIN 81 MG: 81 TABLET, COATED ORAL at 08:36

## 2019-02-05 RX ADMIN — Medication 10 ML: at 21:49

## 2019-02-05 RX ADMIN — INSULIN LISPRO 10 UNITS: 100 INJECTION, SOLUTION INTRAVENOUS; SUBCUTANEOUS at 21:44

## 2019-02-05 RX ADMIN — Medication 20 ML: at 21:44

## 2019-02-05 RX ADMIN — INSULIN LISPRO 2 UNITS: 100 INJECTION, SOLUTION INTRAVENOUS; SUBCUTANEOUS at 12:25

## 2019-02-05 RX ADMIN — MEROPENEM 500 MG: 500 INJECTION, POWDER, FOR SOLUTION INTRAVENOUS at 08:35

## 2019-02-05 RX ADMIN — INSULIN LISPRO 10 UNITS: 100 INJECTION, SOLUTION INTRAVENOUS; SUBCUTANEOUS at 18:19

## 2019-02-05 RX ADMIN — MEROPENEM 500 MG: 500 INJECTION, POWDER, FOR SOLUTION INTRAVENOUS at 00:17

## 2019-02-05 RX ADMIN — INSULIN GLARGINE 60 UNITS: 100 INJECTION, SOLUTION SUBCUTANEOUS at 21:44

## 2019-02-05 RX ADMIN — ROSUVASTATIN CALCIUM 20 MG: 20 TABLET, FILM COATED ORAL at 21:44

## 2019-02-05 NOTE — PROGRESS NOTES
Problem: Self Care Deficits Care Plan (Adult)  Goal: *Acute Goals and Plan of Care (Insert Text)  1. Patient will complete upper body bathing and dressing with stand by assist and adaptive equipment as needed. 2. Patient will complete toileting with moderate assist and adaptive equipment as needed. 3. Patient will tolerate 25 minutes of OT treatment with 1-2 rest breaks to increase activity tolerance for ADLs. 4. Patient will complete functional transfers with minimal assistance and adaptive equipment as needed. Timeframe: 7 visits     OCCUPATIONAL THERAPY: Daily Note and PM    2/5/2019  INPATIENT: OT Visit Days: 1  Payor: Shana Sheldon / Plan: [address] / Product Type: Managed [employer] Medicare /      NAME/AGE/GENDER: Rocael Kebede is a [de-identified] y.o. female   PRIMARY DIAGNOSIS:  Hyperglycemia due to type 2 diabetes mellitus ([facility] [address]  Acute metabolic encephalopathy Acute metabolic encephalopathy Acute metabolic encephalopathy  Procedure(s) (LRB):  MISC ANESTHESIA (N/A)  3 Days Post-Op  ICD-10: Treatment Diagnosis:    · Generalized Muscle Weakness (M62.81)  · Other lack of cordination (R27.8)   Precautions/Allergies:   CONTACT PRECAUTIONS    Sulfa (sulfonamide antibiotics)      ASSESSMENT:     Ms. Felton Faulkner  is a [de-identified] y.o. female admitted for above. At baseline per chart, patient lives alone and has caregiver to assist with ADLs/IADLs but only comes in the morning and leaves. Patient is oriented to self only, very lethargic however opens eyes to voice and follows most commands. 2/5/2019 Pt presents up in the chair upon arrival. Pt agreeable to treatment and completed sit to stand with min a and a rolling walker. Pt was soiled and therapist called PCT into the room to assist with changing pt and linens. Pt stood for approximately 10 minutes with good balance using a rolling walker. Pt returned to sitting and participated in UE exercises. Good effort. Continue OT POC.       This section established at most recent assessment   PROBLEM LIST (Impairments causing functional limitations):  1. Decreased Strength  2. Decreased ADL/Functional Activities  3. Decreased Transfer Abilities  4. Decreased Ambulation Ability/Technique  5. Decreased Balance  6. Decreased Activity Tolerance  7. Decreased Cognition   INTERVENTIONS PLANNED: (Benefits and precautions of occupational therapy have been discussed with the patient.)  1. Activities of daily living training  2. Balance training  3. Clothing management  4. Therapeutic activity  5. Therapeutic exercise     TREATMENT PLAN: Frequency/Duration: Follow patient 3x/week to address above goals. Rehabilitation Potential For Stated Goals: Good     RECOMMENDED REHABILITATION/EQUIPMENT: (at time of discharge pending progress): Due to the probability of continued deficits (see above) this patient will likely need continued skilled occupational therapy after discharge. Equipment:    None at this time              OCCUPATIONAL PROFILE AND HISTORY:   History of Present Injury/Illness (Reason for Referral):  See H&P  Past Medical History/Comorbidities:   Ms. Zavaleta  has a past medical history of CAD (coronary artery disease), DM2 (diabetes mellitus, type 2) ([facility] [address] Gout, HLD (hyperlipidemia), HTN (hypertension), and Peripheral neuropathy. Ms. Zavaleta  has a past surgical history that includes pr cardiac surg procedure unlist; hx cholecystectomy; hx tubal ligation; and hx coronary artery bypass graft. Social History/Living Environment:   Home Environment: Apartment  One/Two Story Residence: One story  Living Alone: Yes  Support Systems: Family member(s), Friends \ neighbors  Patient Expects to be Discharged to[de-identified] Rehabilitation facility  Current DME Used/Available at Home: Walker, rolling, Cane, straight, Wheelchair  Prior Level of Function/Work/Activity:  See above  Personal Factors:          Sex:  female        Age:  [de-identified] y.o.    Number of Personal Factors/Comorbidities that affect the Plan of Care: Expanded review of therapy/medical records (1-2):  MODERATE COMPLEXITY   ASSESSMENT OF OCCUPATIONAL PERFORMANCE[de-identified]   Activities of Daily Living:   Basic ADLs (From Assessment) Complex ADLs (From Assessment)   Feeding: Minimum assistance  Oral Facial Hygiene/Grooming: Moderate assistance  Bathing: Maximum assistance  Upper Body Dressing: Moderate assistance  Lower Body Dressing: Maximum assistance  Toileting: Total assistance     Grooming/Bathing/Dressing Activities of Daily Living     Cognitive Retraining  Safety/Judgement: Awareness of environment; Fall prevention                       Bed/Mat Mobility  Rolling: Contact guard assistance  Supine to Sit: Contact guard assistance;Minimum assistance  Sit to Supine: (left up in chair)  Sit to Stand: Minimum assistance  Bed to Chair: Minimum assistance       Most Recent Physical Functioning:   Gross Assessment:                  Posture:  Posture (WDL): Exceptions to WDL  Posture Assessment: Forward head, Rounded shoulders  Balance:  Sitting: Impaired  Sitting - Static: Good (unsupported)  Sitting - Dynamic: Fair (occasional)  Standing: Impaired  Standing - Static: Constant support; Fair  Standing - Dynamic : Poor(with support) Bed Mobility:  Rolling: Contact guard assistance  Supine to Sit: Contact guard assistance;Minimum assistance  Sit to Supine: (left up in chair)  Wheelchair Mobility:     Transfers:  Sit to Stand: Minimum assistance  Stand to Sit: Minimum assistance  Bed to Chair: Minimum assistance            Patient Vitals for the past 6 hrs:   BP BP Patient Position SpO2 O2 Flow Rate (L/min) Pulse   02/05/19 0808 146/76 At rest 98 % -- 78   02/05/19 1138 147/68 At rest 94 % -- 63   02/05/19 1141 -- -- -- 2 l/min --       Mental Status  Neurologic State: Alert  Orientation Level: Oriented to person, Oriented to place, Oriented to situation  Cognition: Follows commands  Perception: Appears intact  Perseveration: No perseveration noted  Safety/Judgement: Awareness of environment, Fall prevention                          Physical Skills Involved:  1. Range of Motion  2. Balance  3. Strength  4. Activity Tolerance  5. Fine Motor Control  6. Gross Motor Control Cognitive Skills Affected (resulting in the inability to perform in a timely and safe manner):  1. Perception  2. Executive Function  3. Short Term Recall  4. Long Term Memory  5. Sustained Attention Psychosocial Skills Affected:  1. Habits/Routines  2. Social Interaction  3. Emotional Regulation   Number of elements that affect the Plan of Care: 3-5:  MODERATE COMPLEXITY   CLINICAL DECISION MAKING:   [facility] [employer] AM-PAC 6 Clicks   Daily Activity Inpatient Short Form  How much help from another person does the patient currently need. .. Total A Lot A Little None   1. Putting on and taking off regular lower body clothing? [x] 1   [] 2   [] 3   [] 4   2. Bathing (including washing, rinsing, drying)? [] 1   [x] 2   [] 3   [] 4   3. Toileting, which includes using toilet, bedpan or urinal?   [x] 1   [] 2   [] 3   [] 4   4. Putting on and taking off regular upper body clothing? [] 1   [x] 2   [] 3   [] 4   5. Taking care of personal grooming such as brushing teeth? [] 1   [] 2   [x] 3   [] 4   6. Eating meals? [] 1   [] 2   [x] 3   [] 4   © 2007, Trustees of [facility] [employer], under license to [employer]. All rights reserved      Score:  Initial: 12 Most Recent: X (Date: -- )    Interpretation of Tool:  Represents activities that are increasingly more difficult (i.e. Bed mobility, Transfers, Gait). Medical Necessity:     · Patient demonstrates good rehab potential due to higher previous functional level. Reason for Services/Other Comments:  · Patient continues to require modification of therapeutic interventions to increase complexity of exercises.    Use of outcome tool(s) and clinical judgement create a POC that gives a: MODERATE COMPLEXITY TREATMENT:   (In addition to Assessment/Re-Assessment sessions the following treatments were rendered)     Pre-treatment Symptoms/Complaints:    Pain: Initial:   Pain Intensity 1: 0  Post Session:  0     Therapeutic Activity: (15 minutes): Therapeutic activities including sit to stand and static standing to improve mobility, strength and balance. Required minimal Safety awareness training;Verbal cues to promote static and dynamic balance in standing. Therapeutic Exercise: (10 minutes):  Exercises per grid below to improve mobility, strength and dynamic movement of arm - bilateral to improve functional bending, lifting and reaching. Required minimal visual and verbal cues to promote proper body mechanics. Progressed resistance, range and repetitions as indicated. Date:  2/5/19 Date:   Date:     Activity/Exercise Parameters Parameters Parameters   Shoulder flexion/extension 10 reps with green theraband     Shoulder horizontal add/abb 10 reps with green theraband     Punches 10 reps with green theraband     Elbow flexion/extension 10 reps with green theraband     Tricep extension 10 reps with green theraband                             Braces/Orthotics/Lines/Etc:   · IV  · O2 Device: Nasal cannula  Treatment/Session Assessment:    · Response to Treatment:  Good participation  · Interdisciplinary Collaboration:   o Certified Occupational Therapy Assistant  o Registered Nurse  · After treatment position/precautions:   o Up in chair  o Bed/Chair-wheels locked  o Call light within reach  o RN notified  o Family at bedside   · Compliance with Program/Exercises: Will assess as treatment progresses. · Recommendations/Intent for next treatment session: \"Next visit will focus on advancements to more challenging activities\".   Total Treatment Duration:  OT Patient Time In/Time Out  Time In: 1250  Time Out: [facility]

## 2019-02-05 NOTE — PROGRESS NOTES
Problem: Mobility Impaired (Adult and Pediatric)  Goal: *Acute Goals and Plan of Care (Insert Text)  1. Ms. Tanya Pizarro will perform supine to sit and sit to supine with contact guard in 7 days. 2.  Ms. Tanya Pizarro will sit edge of bed unsupported in 7 days. 3.  Ms. Tanya Pizarro will perform sit to stand and bed to chair with min assist in 7 days. GOAL MET 2/4/2019  4. Ms. Tanya Pizarro will perform therex to bilateral lower extremities x 25 reps in 7 days. New goal added 2/4/19:  5. Pt will ambulate for 150+ with LRD with MIN A within 7 treatment days. PHYSICAL THERAPY: Daily Note and AM 2/5/2019  INPATIENT: PT Visit Days : 3  Payor: Deonte Mo / Plan: [address] / Product Type: [employer] /       NAME/AGE/GENDER: Kenyatta Vernon is a [de-identified] y.o. female   PRIMARY DIAGNOSIS: Hyperglycemia due to type 2 diabetes mellitus ([facility] [address]  Acute metabolic encephalopathy Acute metabolic encephalopathy Acute metabolic encephalopathy  Procedure(s) (LRB):  MISC ANESTHESIA (N/A)  3 Days Post-Op  ICD-10: Treatment Diagnosis:    · Generalized Muscle Weakness (M62.81)  · Difficulty in walking, Not elsewhere classified (R26.2)   Precaution/Allergies:  Sulfa (sulfonamide antibiotics)      ASSESSMENT:     Ms. Tanya Pizarro presents supine on arrival, 2 L/min O2 via n.c, pure wick, IV. Pt A & O x 3, seems more confused in conversation this date. Pt making little to no progress with transfers and mobility this date. Pt unsteady with static and dynamic standing, required increased assist for ambulation. Pt unsafe to ambulate outside of room this date, may need to follow with chair next visit to fully assess ambulation, balance and activity tolerance. This section established at most recent assessment   PROBLEM LIST (Impairments causing functional limitations):  1. Decreased Strength  2. Decreased Transfer Abilities  3. Decreased Ambulation Ability/Technique  4.  Decreased Activity Tolerance INTERVENTIONS PLANNED: (Benefits and precautions of physical therapy have been discussed with the patient.)  1. Bed Mobility  2. Gait Training  3. Therapeutic Activites  4. Therapeutic Exercise/Strengthening  5. Transfer Training     TREATMENT PLAN: Frequency/Duration: 4 times a week for duration of hospital stay  Rehabilitation Potential For Stated Goals: Good     RECOMMENDED REHABILITATION/EQUIPMENT: (at time of discharge pending progress): Due to the probability of continued deficits (see above) this patient will likely need continued skilled physical therapy after discharge. Equipment:    to be determined. HISTORY:   History of Present Injury/Illness (Reason for Referral):  Pt is an [de-identified] y/o F with DM, HTN, recurrent UTIs on suppressive macrobid, who presented from home with altered mental status. She lives alone but has a caretaker who comes in the mornings to help her, but does not stay the whole day. Her daughter says the last time she was normal was on Monday morning; at that time they spoke on the phone and she sounded fine. However she reports that the caretaker had difficulty getting her out of bed and she was diffusely weak. On Tuesday she was altered and her speech was confusing to the daughter; thought process was disorganized and she rambled. Today she had significant decline and will not even talk to me in the exam room or make eye contact. Family says she has not voluntarily complained of any symptoms such as fevers, pain, dysuria but they did not ask her       Past Medical History/Comorbidities:   Ms. Genny Burrell  has a past medical history of CAD (coronary artery disease), DM2 (diabetes mellitus, type 2) ([address] Gout, HLD (hyperlipidemia), HTN (hypertension), and Peripheral neuropathy. Ms. Genny Burrell  has a past surgical history that includes pr cardiac surg procedure unlist; hx cholecystectomy; hx tubal ligation; and hx coronary artery bypass graft.   Social History/Living Environment: Home Environment: Apartment  One/Two Story Residence: One story  Living Alone: Yes  Support Systems: Family member(s), Friends \ neighbors  Patient Expects to be Discharged to[de-identified] Rehabilitation facility  Current DME Used/Available at Home: Poli Hutch, rolling, Cane, straight, Wheelchair  Prior Level of Function/Work/Activity:  Independent with rolling walker . age   Number of Personal Factors/Comorbidities that affect the Plan of Care: 1-2: MODERATE COMPLEXITY   EXAMINATION:   Most Recent Physical Functioning:   Gross Assessment:                  Posture:  Posture Assessment: Forward head, Rounded shoulders  Balance:  Sitting: Impaired  Sitting - Static: Good (unsupported)  Sitting - Dynamic: Fair (occasional)  Standing: Impaired  Standing - Static: Constant support; Fair  Standing - Dynamic : Poor(with support) Bed Mobility:  Rolling: Contact guard assistance  Supine to Sit: Contact guard assistance;Minimum assistance  Sit to Supine: (left up in chair)  Wheelchair Mobility:     Transfers:  Sit to Stand: Minimum assistance  Stand to Sit: Minimum assistance  Bed to Chair: Minimum assistance  Gait:     Base of Support: Center of gravity altered; Widened  Speed/Noemi: Slow  Step Length: Left shortened;Right shortened  Gait Abnormalities: Decreased step clearance;Shuffling gait  Distance (ft): 5 Feet (ft)(bed to chair, unsteady gait)  Assistive Device: Gait belt;Walker, rolling  Ambulation - Level of Assistance: Minimal assistance  Interventions: Safety awareness training;Verbal cues      Body Structures Involved:  1. Muscles Body Functions Affected:  1. Movement Related Activities and Participation Affected:  1.  Mobility   Number of elements that affect the Plan of Care: 3: MODERATE COMPLEXITY   CLINICAL PRESENTATION:   Presentation: Evolving clinical presentation with changing clinical characteristics: MODERATE COMPLEXITY   CLINICAL DECISION MAKIN [facility] Form  How much difficulty does the patient currently have. .. Unable A Lot A Little None   1. Turning over in bed (including adjusting bedclothes, sheets and blankets)? [] 1   [x] 2   [] 3   [] 4   2. Sitting down on and standing up from a chair with arms ( e.g., wheelchair, bedside commode, etc.)   [] 1   [x] 2   [] 3   [] 4   3. Moving from lying on back to sitting on the side of the bed? [] 1   [x] 2   [] 3   [] 4   How much help from another person does the patient currently need. .. Total A Lot A Little None   4. Moving to and from a bed to a chair (including a wheelchair)? [x] 1   [] 2   [] 3   [] 4   5. Need to walk in hospital room? [x] 1   [] 2   [] 3   [] 4   6. Climbing 3-5 steps with a railing? [x] 1   [] 2   [] 3   [] 4   © 2007, Trustees of [address] [place] [address], under license to [employer]. All rights reserved      Score:  Initial: 9 Most Recent: X (Date: -- )    Interpretation of Tool:  Represents activities that are increasingly more difficult (i.e. Bed mobility, Transfers, Gait). Medical Necessity:     · Patient is expected to demonstrate progress in functional technique to decrease assistance required with mobility and gait. .  Reason for Services/Other Comments:  · Patient continues to require present interventions due to patient's inability to function at baseline. .   Use of outcome tool(s) and clinical judgement create a POC that gives a: Questionable prediction of patient's progress: MODERATE COMPLEXITY            TREATMENT:   (In addition to Assessment/Re-Assessment sessions the following treatments were rendered)   Pre-treatment Symptoms/Complaints:  \"I would like to sit up\"  Pain: Initial:   Pain Intensity 1: 0  Post Session:  0/10 post session     Therapeutic Activity: (    10 min): Therapeutic activities including Bed transfers, sit to stand transfers, ambulation on level ground, walker safety, balance, posture to improve mobility, strength, balance and coordination. Required min to moderate Safety awareness training;Verbal cues to promote motor control of bilateral, upper extremity(s), lower extremity(s). Pt's lunch arrived as pt seated into chair, set up for lunch with needs in reach. Braces/Orthotics/Lines/Etc:   · IV  · pure wick  · O2 Device: Room air  Treatment/Session Assessment:    · Response to Treatment:  Minimal progress made this date  · Interdisciplinary Collaboration:   o Registered Nurse  · After treatment position/precautions:   o Up in chair  o Bed/Chair-wheels locked  o Bed in low position  o Call light within reach  o RN notified   · Compliance with Program/Exercises: Will assess as treatment progresses  · Recommendations/Intent for next treatment session: \"Next visit will focus on advancements to more challenging activities and reduction in assistance provided\".   Total Treatment Duration:  PT Patient Time In/Time Out  Time In: 1141  Time Out: [facility], PT

## 2019-02-05 NOTE — PROGRESS NOTES
END OF SHIFT NOTE: Up in chair this shift Tolerated well. BS elevated this shift but starting to come down 357 last check See BS sheet Have reported to night shift nurse to continue to follow up. Hourly rounds completed, all needs met. Will continue to monitor until night shift nurse takes over. INTAKE/OUTPUT  02/03 0701 - 02/04 0700  In: 360 [P.O.:360]  Out: 850 [Urine:850]  Voiding: YES  Catheter: NO  Color: clear  Drain:   External Female Catheter 01/31/19 (Active)   Site Assessment Clean, dry, & intact 2/4/2019  3:10 AM   Repositioned Yes 2/4/2019  3:10 AM   Perineal Care Yes 2/4/2019  3:10 AM   Wick Changed No 2/4/2019  3:10 AM   Suction Canister/Tubing Changed No 2/4/2019  3:10 AM   Urine Output (mL) 300 ml 2/3/2019  8:05 AM               DIET  Diabetic    Flatus: Patient does have flatus present. Stool:  0 occurrences. Characteristics:       Ambulating  No    Emesis: 0 occurrences.     Characteristics:          VITAL SIGNS  Patient Vitals for the past 12 hrs:   Temp Pulse Resp BP SpO2   02/04/19 1935 98.4 °F (36.9 °C) 73 18 158/75 99 %   02/04/19 1602 98.1 °F (36.7 °C) 69 18 147/84 100 %   02/04/19 1220 98.2 °F (36.8 °C) 84 18 125/66 99 %   02/04/19 0849 98.1 °F (36.7 °C) 83 18 114/41 95 %       Pain Assessment  Pain Intensity 1: 0 (02/04/19 1935)        Patient Stated Pain Goal: 0            Martha Fontenot RN

## 2019-02-05 NOTE — PROGRESS NOTES
Hospitalist Progress Note     Admit Date:  2019 10:03 AM   Name:  Anusha Carl   Age:  [de-identified] y.o.  :  1940   MRN:  [id number]   PCP:  Geno Crisostomo MD  Treatment Team: Attending Provider: India Wilson MD; Utilization Review: Jenny Villanueva RN; Primary Nurse: Chica Cortez; Care Manager: Guerita Zayas LMSW; Consulting Provider: Ben Blevins MD; Speech Language Pathologist: Jaja Acuna, SLP    Subjective:   Pt is an [de-identified] y/o F with DM, HTN, recurrent UTIs on suppressive macrobid, who presented from home with altered mental status. Was severely altered and could not respond or make eye contact. She has a caretaker who helps her in the morning but otherwise lives alone. Was admitted with UTI and AoCKD. Started on rocephin, IVF. Zosyn was ordered due to failure to improve and recurrent low grade fever. Was extremely lethargic so neuro consulted on . Concern for meningitis so CSF obtained which had glc<1. Pt started on vanc/rocephin/amp/acyclovir after CSF obtained. MRI unremarkable. EEG showed severe encephalopathy. ID consulted - lower suspicion for meningitis. Her mental status vastly improved on . Pt wanting to go home. States she feels \"ok. \" No complaints.     Objective:     Patient Vitals for the past 24 hrs:   Temp Pulse Resp BP SpO2   19 1138 98.5 °F (36.9 °C) 63 18 147/68 94 %   19 0808 98.4 °F (36.9 °C) 78 16 146/76 98 %   19 0400 98.2 °F (36.8 °C) 60 18 159/75 97 %   19 2313 98.7 °F (37.1 °C) 67 18 163/72 100 %   19 1935 98.4 °F (36.9 °C) 73 18 158/75 99 %   19 1602 98.1 °F (36.7 °C) 69 18 147/84 100 %     Oxygen Therapy  O2 Sat (%): 94 % (19 1138)  Pulse via Oximetry: 80 beats per minute (19)  O2 Device: Nasal cannula (19 114)  O2 Flow Rate (L/min): 2 l/min (19 114)    Intake/Output Summary (Last 24 hours) at 2019 1458  Last data filed at 2019 1204  Gross per 24 hour   Intake --   Output 2850 ml   Net -2850 ml       *Note that automatically entered I/Os may not be accurate; dependent on patient compliance with collection and accurate  by assistants. Physical Exam:  General:    Well nourished. Alert. No distress  CV:   Hillary Satchel, reg. No m/r/g. Lungs:  CTAB. No wheezing, rhonchi, or rales. Extremities: Warm and dry. No cyanosis or edema. Skin:     No rashes or jaundice. Normal coloration  Neuro:  Awake, alert, oriented x 3    I reviewed the labs, imaging, EKGs, telemetry, and other studies done this admission.   Data Review:   Recent Results (from the past 24 hour(s))   GLUCOSE, POC    Collection Time: 02/04/19  3:04 PM   Result Value Ref Range    Glucose (POC) 488 (HH) 65 - 100 mg/dL   GLUCOSE, POC    Collection Time: 02/04/19  3:06 PM   Result Value Ref Range    Glucose (POC) 468 (HH) 65 - 100 mg/dL   GLUCOSE, POC    Collection Time: 02/04/19  6:07 PM   Result Value Ref Range    Glucose (POC) 357 (H) 65 - 100 mg/dL   GLUCOSE, POC    Collection Time: 02/04/19 10:14 PM   Result Value Ref Range    Glucose (POC) 310 (H) 65 - 100 mg/dL   GLUCOSE, POC    Collection Time: 02/05/19  8:05 AM   Result Value Ref Range    Glucose (POC) 120 (H) 65 - 100 mg/dL   GLUCOSE, POC    Collection Time: 02/05/19 11:55 AM   Result Value Ref Range    Glucose (POC) 199 (H) 65 - 100 mg/dL       All Micro Results     Procedure Component Value Units Date/Time    CULTURE, CSF Marceil Found STAIN [[id number]] Collected:  02/01/19 1610    Order Status:  Completed Specimen:  Cerebrospinal Fluid Updated:  02/05/19 0821     Special Requests: NO SPECIAL REQUESTS        GRAM STAIN 0 TO 2 WBCS SEEN PER OIF      NO DEFINITE ORGANISM SEEN        Culture result: NO GROWTH 3 DAYS       CULTURE, URINE [[id number]]  (Abnormal)  (Susceptibility) Collected:  01/30/19 1220    Order Status:  Completed Specimen:  Urine from Clean catch Updated:  02/04/19 1147     Special Requests: NO SPECIAL REQUESTS Culture result:       >100,000 COLONIES/mL ESCHERICHIA COLI ** (EXTENDED SPECTRUM BETA LACTAMASE ) **                  RESULTS VERIFIED, PHONED TO AND READ BACK BY  Swetha Alvarado, RN @ 5296 ON 2/2/2019 BY AMM        FOSFOMYCIN TO FOLLOW    MENINGITIS PATHOGENS PANEL, CSF (BY PCR) [[id number]]     Order Status:  Sent Specimen:  Cerebrospinal Fluid     CRYPTOCOCCAL AG, CSF W/REFLEX TITER [[id number]] Collected:  02/01/19 1610    Order Status:  Completed Specimen:  Serum Updated:  02/01/19 1723     Cryptococcus Ag, CSF NEGATIVE        HSV 1 AND 2 BY PCR [[id number]] Collected:  02/01/19 1635    Order Status:  Canceled Specimen:  Other     MENINGITIS PATHOGENS PANEL, CSF (BY PCR) [[id number]] Collected:  02/01/19 1545    Order Status:  Canceled Specimen:  Cerebrospinal Fluid     CSF HOLD [[id number]]     Order Status:  Sent Specimen:  Cerebrospinal Fluid     CRYPTOCOCCUS AG W/REFLEX TITER [[id number]] Collected:  02/01/19 1530    Order Status:  Canceled Specimen:  Serum from Blood           Current Facility-Administered Medications   Medication Dose Route Frequency    insulin glargine (LANTUS) injection 60 Units  60 Units SubCUTAneous QHS    meropenem (MERREM) 500 mg in 0.9% sodium chloride (MBP/ADV) 50 mL  0.5 g IntraVENous Q8H    LORazepam (ATIVAN) injection 2 mg  2 mg IntraVENous Q4H PRN    sodium chloride (NS) flush 5-40 mL  5-40 mL IntraVENous Q8H    sodium chloride (NS) flush 5-40 mL  5-40 mL IntraVENous PRN    aspirin delayed-release tablet 81 mg  81 mg Oral DAILY    colchicine tablet 0.6 mg  0.6 mg Oral TID PRN    pantoprazole (PROTONIX) tablet 40 mg  40 mg Oral ACB    rOPINIRole (REQUIP) tablet 0.5 mg  0.5 mg Oral QHS PRN    rosuvastatin (CRESTOR) tablet 20 mg  20 mg Oral QHS    traMADol (ULTRAM) tablet 50 mg  50 mg Oral Q8H PRN    sodium chloride (NS) flush 5-40 mL  5-40 mL IntraVENous Q8H    sodium chloride (NS) flush 5-40 mL  5-40 mL IntraVENous PRN    acetaminophen (TYLENOL) tablet 650 mg 650 mg Oral Q4H PRN    HYDROcodone-acetaminophen (NORCO) 5-325 mg per tablet 1 Tab  1 Tab Oral Q4H PRN    naloxone (NARCAN) injection 0.4 mg  0.4 mg IntraVENous PRN    diphenhydrAMINE (BENADRYL) capsule 25 mg  25 mg Oral Q4H PRN    ondansetron (ZOFRAN) injection 4 mg  4 mg IntraVENous Q4H PRN    senna-docusate (PERICOLACE) 8.6-50 mg per tablet 2 Tab  2 Tab Oral DAILY PRN    LORazepam (ATIVAN) tablet 0.5 mg  0.5 mg Oral Q4H PRN    zolpidem (AMBIEN) tablet 5 mg  5 mg Oral QHS PRN    insulin lispro (HUMALOG) injection   SubCUTAneous AC&HS    dextrose 40% (GLUTOSE) oral gel 1 Tube  15 g Oral PRN    glucagon (GLUCAGEN) injection 1 mg  1 mg IntraMUSCular PRN    dextrose (D50W) injection syrg 12.5-25 g  25-50 mL IntraVENous PRN    bisacodyl (DULCOLAX) tablet 5 mg  5 mg Oral DAILY PRN    alum-mag hydroxide-simeth (MYLANTA) oral suspension 30 mL  30 mL Oral Q4H PRN    hydrALAZINE (APRESOLINE) 20 mg/mL injection 20 mg  20 mg IntraVENous Q4H PRN       Other Studies:  No results found.     Assessment and Plan:     Hospital Problems as of 2/5/2019 Date Reviewed: 1/30/2019          Codes Class Noted - Resolved POA    Meningitis ICD-10-CM: G03.9  ICD-9-CM: 322.9  2/3/2019 - Present Yes        Seizure-like activity ([facility] ICD-10-CM: R56.9  ICD-9-CM: 780.39  2/1/2019 - Present Yes        Hyperglycemia due to type 2 diabetes mellitus ([facility] ICD-10-CM: E11.65  ICD-9-CM: 250.00  1/30/2019 - Present Yes        * (Principal) Acute metabolic encephalopathy [facility]-11-XG: G93.41  ICD-9-CM: 348.31  1/30/2019 - Present Yes        Acute cystitis without hematuria ICD-10-CM: N30.00  ICD-9-CM: 595.0  1/30/2019 - Present Yes        Uncontrolled type 2 diabetes mellitus with hyperglycemia (HCC) (Chronic) ICD-10-CM: E11.65  ICD-9-CM: 250.02  6/15/2015 - Present Yes        Essential hypertension (Chronic) ICD-10-CM: I10  ICD-9-CM: 401.9  6/15/2015 - Present Yes        Chronic kidney disease, stage III (moderate) (HCC) (Chronic) ICD-10-CM: N18.3  ICD-9-CM: 585.3  6/15/2015 - Present Yes        RESOLVED: Acute renal failure superimposed on stage 3 chronic kidney disease ([facility] [address] ICD-10-CM: N17.9, N18.3  ICD-9-CM: 584.9, 585.3  1/31/2019 - 2/3/2019 Yes              Acute metabolic encephalopathy  Lower suspicion for meningitis. Likely secondary to UTI. -mental status vastly improved vs admission  -MRI no acute abnormalities  -abx changed to merrem on 2/3 per ID  -follow CSF and other cultures  -SLP diet mech soft  -neuro and ID consulted, appreciate help    MDR E coli in Urine  -Merrem per ID    Possible seizure activity  -monitor for recurrence  -PRN ativan    HyperNa  -resolved    DM2 with hyperglycemia  -cont lantus at 50 units  -ISS    HTN  - resume losartan  - holding home HCTZ, lasix as BP well controlled. -stop metoprolol due to bradycardia.   - PRN hydralazine IV    AoCKD  -at baseline now    Discharge planning:  CM says pt has SNF bed, awaiting clearance from ID    Signed:  Suzanne Fontenot MD

## 2019-02-05 NOTE — PROGRESS NOTES
Speech Pathology  ST attempted. When SLP entered room pt was kneeling on the side of the bed. RN informed. Pt's daughter then arrived. She reported that is how she found the pt upon returning from downstairs and had just called for the RN. RN and assistant re-positioned pt back onto the bed. RN noted pt had had a bowel movement. RN and assistant were getting ready to change pt. Will continue to follow. Thank you.     Vi[address] CCC-SLP

## 2019-02-05 NOTE — PROGRESS NOTES
Hourly rounds completed shift. All needs met. Bed low/locked. No c/o pain. Call light in reach. Will continue to monitor pt and give report to oncoming RN. Peripheral IV 02/02/19 Anterior;Proximal;Right Forearm (Active)   Site Assessment Clean, dry, & intact 2/5/2019  2:06 AM   Phlebitis Assessment 0 2/5/2019  2:06 AM   Infiltration Assessment 0 2/5/2019  2:06 AM   Dressing Status Clean, dry, & intact 2/5/2019  2:06 AM   Dressing Type Transparent;Tape 2/5/2019  2:06 AM   Hub Color/Line Status Pink;Flushed;Patent 2/5/2019  2:06 AM   Alcohol Cap Used No 2/4/2019  3:44 PM       Peripheral IV 02/03/19 Distal;Right Arm (Active)   Site Assessment Clean, dry, & intact 2/5/2019  2:06 AM   Phlebitis Assessment 0 2/5/2019  2:06 AM   Infiltration Assessment 0 2/5/2019  2:06 AM   Dressing Status Clean, dry, & intact 2/5/2019  2:06 AM   Dressing Type Transparent;Tape 2/5/2019  2:06 AM   Hub Color/Line Status Blue;Flushed;Patent 2/5/2019  2:06 AM   Alcohol Cap Used No 2/4/2019  3:44 PM       Peripheral IV 02/03/19 Left;Posterior Forearm (Active)   Site Assessment Clean, dry, & intact 2/5/2019  2:06 AM   Phlebitis Assessment 0 2/5/2019  2:06 AM   Infiltration Assessment 0 2/5/2019  2:06 AM   Dressing Status Clean, dry, & intact 2/5/2019  2:06 AM   Dressing Type Transparent;Tape 2/5/2019  2:06 AM   Hub Color/Line Status Blue;Patent; Infusing 2/5/2019  2:06 AM   Alcohol Cap Used No 2/4/2019  3:44 PM

## 2019-02-05 NOTE — PROGRESS NOTES
Infectious Disease Progress Note    Today's Date: 2019   Admit Date: 2019    Impression:   · Altered mental status, improved, s/p LP with 10 WBCs, 86% neutrophils, 12% lymphs 2% monos, and 575 RBCs. Protein 8, glucose <1. 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 · ESBL E coli UTI   · Blood glucose 587 on admission; this has improved. Plan:   · I suspect that presentation could be related to the patient not taking her meds. UTI also possible. · Recommend completing a 7 day course of treatment for E. Coli UTI - EOT 2/10/19. If isolate is fosfomycin susceptible, would continue with fosfomycin 3gm q72hrs to complete treatment. · ID will follow up once fosfomycin susceptibility returns. Anti-infectives:   · Vancomycin  · Ceftriaxone  · Ampicillin  · Acyclovir  · Merrem    Subjective:   Pt afebrile; no complaints; discussed working with PT and ADLs; Allergies   Allergen Reactions    Sulfa (Sulfonamide Antibiotics) Swelling        Review of Systems:  Pertinent items are noted in the History of Present Illness.     Objective:     Visit Vitals  /76 (BP 1 Location: Left arm, BP Patient Position: At rest)   Pulse 78   Temp 98.4 °F (36.9 °C)   Resp 16   Ht 5' 4\" (1.626 m)   Wt 80.9 kg (178 lb 6.4 oz)   SpO2 98%   BMI 30.62 kg/m²     Temp (24hrs), Av.3 °F (36.8 °C), Min:98.1 °F (36.7 °C), Max:98.7 °F (37.1 °C)       Lines:  Peripheral IV:       Physical Exam:    General:  Alert, cooperative, in no distress;   Eyes:  Cloudy, with arcus senilus   Mouth/Throat: oral pharynx clear   Neck: Supple   Lungs:   Clear lungs without increased work of breathing   CV:  Regular rate and rhythm, no audible murmur, click, rub or gallop   Abdomen:   Soft, non-tender, bowel sounds active   Extremities: No cyanosis; trace bilateral LE edema;   Skin: Skin color, texture, turgor normal, no rash   Lymph nodes:    Musculoskeletal: No swelling or deformity   Lines/Devices:  Intact, no erythema, drainage or tenderness   Psych: Alert and cooperative, oriented to person and place;        Data Review:     CBC:  Recent Labs     02/04/19  0725 02/03/19  0534   WBC 5.6 8.2   GRANS 67 68   MONOS 10 12   EOS 4 2   ANEU 3.8 5.6   ABL 1.0 1.4   HGB 9.9* 10.8*   HCT 32.1* 35.6*    161       BMP:  Recent Labs     02/04/19  0725 02/03/19  0534   CREA 1.44* 1.10*   BUN 20 15    143   K 3.8 3.6   * 111*   CO2 27 26   AGAP 6* 6*   * 73       LFTS:  Recent Labs     02/03/19  0534   TBILI 0.2   ALT 28   SGOT 22   AP 96   TP 6.0*   ALB 2.5*       Microbiology:     All Micro Results     Procedure Component Value Units Date/Time    CULTURE, CSF Rut Kasal STAIN [[id number]] Collected:  02/01/19 1610    Order Status:  Completed Specimen:  Cerebrospinal Fluid Updated:  02/05/19 0821     Special Requests: NO SPECIAL REQUESTS        GRAM STAIN 0 TO 2 WBCS SEEN PER OIF      NO DEFINITE ORGANISM SEEN        Culture result: NO GROWTH 3 DAYS       CULTURE, URINE [[id number]]  (Abnormal)  (Susceptibility) Collected:  01/30/19 1220    Order Status:  Completed Specimen:  Urine from Clean catch Updated:  02/04/19 1147     Special Requests: NO SPECIAL REQUESTS        Culture result:       >100,000 COLONIES/mL ESCHERICHIA COLI ** (EXTENDED SPECTRUM BETA LACTAMASE ) **                  RESULTS VERIFIED, PHONED TO AND READ BACK BY  Ashley Teixeira RN @ 1430 ON 2/2/2019 BY [facility]        FOSFOMYCIN TO FOLLOW    MENINGITIS PATHOGENS PANEL, CSF (BY PCR) [[id number]]     Order Status:  Sent Specimen:  Cerebrospinal Fluid     CRYPTOCOCCAL AG, CSF W/REFLEX TITER [[id number]] Collected:  02/01/19 1610    Order Status:  Completed Specimen:  Serum Updated:  02/01/19 1723     Cryptococcus Ag, CSF NEGATIVE        HSV 1 AND 2 BY PCR [[id number]] Collected:  02/01/19 1635    Order Status:  Canceled Specimen:  Other     MENINGITIS PATHOGENS PANEL, CSF (BY PCR) [[id number]] Collected:  02/01/19 1545    Order Status:  Canceled Specimen: Cerebrospinal Fluid     CSF HOLD [[id number]]     Order Status:  Sent Specimen:  Cerebrospinal Fluid     CRYPTOCOCCUS AG [facility] TITER [[id number]] Collected:  02/01/19 1530    Order Status:  Canceled Specimen:  Serum from Blood           Imaging:   No new imaging    Signed By: Dhruv Glynn MD     February 5, 2019

## 2019-02-05 NOTE — PROGRESS NOTES
Checked on pt after administration of Lantus 60units & Humalog 8 units. Pt states she feels fine. Alert/oriented x4.

## 2019-02-06 VITALS
WEIGHT: 178.4 LBS | HEART RATE: 62 BPM | DIASTOLIC BLOOD PRESSURE: 67 MMHG | BODY MASS INDEX: 30.46 KG/M2 | RESPIRATION RATE: 18 BRPM | HEIGHT: 64 IN | TEMPERATURE: 98.4 F | OXYGEN SATURATION: 99 % | SYSTOLIC BLOOD PRESSURE: 136 MMHG

## 2019-02-06 LAB
BACTERIA SPEC CULT: ABNORMAL
GLUCOSE BLD STRIP.AUTO-MCNC: 191 MG/DL (ref 65–100)
GLUCOSE BLD STRIP.AUTO-MCNC: 78 MG/DL (ref 65–100)
SERVICE CMNT-IMP: ABNORMAL

## 2019-02-06 PROCEDURE — 74011636637 HC RX REV CODE- 636/637: Performed by: INTERNAL MEDICINE

## 2019-02-06 PROCEDURE — 74011000258 HC RX REV CODE- 258: Performed by: INTERNAL MEDICINE

## 2019-02-06 PROCEDURE — 92523 SPEECH SOUND LANG COMPREHEN: CPT

## 2019-02-06 PROCEDURE — 74011250636 HC RX REV CODE- 250/636: Performed by: INTERNAL MEDICINE

## 2019-02-06 PROCEDURE — 82962 GLUCOSE BLOOD TEST: CPT

## 2019-02-06 PROCEDURE — 74011250637 HC RX REV CODE- 250/637: Performed by: INTERNAL MEDICINE

## 2019-02-06 RX ORDER — GRANULES FOR ORAL 3 G/1
3 POWDER ORAL
Qty: 2 PACKET | Refills: 0 | Status: SHIPPED | OUTPATIENT
Start: 2019-02-07 | End: 2019-02-11

## 2019-02-06 RX ORDER — INSULIN LISPRO 100 [IU]/ML
10 INJECTION, SOLUTION INTRAVENOUS; SUBCUTANEOUS
Status: DISCONTINUED | OUTPATIENT
Start: 2019-02-06 | End: 2019-02-06

## 2019-02-06 RX ORDER — INSULIN GLARGINE 100 [IU]/ML
50 INJECTION, SOLUTION SUBCUTANEOUS
Qty: 1 VIAL | Refills: 0 | Status: SHIPPED | OUTPATIENT
Start: 2019-02-06 | End: 2021-01-01

## 2019-02-06 RX ORDER — INSULIN LISPRO 100 [IU]/ML
4 INJECTION, SOLUTION INTRAVENOUS; SUBCUTANEOUS ONCE
Status: COMPLETED | OUTPATIENT
Start: 2019-02-06 | End: 2019-02-06

## 2019-02-06 RX ORDER — INSULIN LISPRO 100 [IU]/ML
INJECTION, SOLUTION INTRAVENOUS; SUBCUTANEOUS
Qty: 1 VIAL | Refills: 0 | Status: ON HOLD
Start: 2019-02-06 | End: 2019-09-19

## 2019-02-06 RX ORDER — INSULIN GLARGINE 100 [IU]/ML
55 INJECTION, SOLUTION SUBCUTANEOUS
Status: DISCONTINUED | OUTPATIENT
Start: 2019-02-06 | End: 2019-02-06 | Stop reason: HOSPADM

## 2019-02-06 RX ADMIN — MEROPENEM 500 MG: 500 INJECTION, POWDER, FOR SOLUTION INTRAVENOUS at 08:23

## 2019-02-06 RX ADMIN — LOSARTAN POTASSIUM 50 MG: 50 TABLET ORAL at 08:23

## 2019-02-06 RX ADMIN — Medication 10 ML: at 13:12

## 2019-02-06 RX ADMIN — ASPIRIN 81 MG: 81 TABLET, COATED ORAL at 08:23

## 2019-02-06 RX ADMIN — INSULIN LISPRO 2 UNITS: 100 INJECTION, SOLUTION INTRAVENOUS; SUBCUTANEOUS at 13:05

## 2019-02-06 RX ADMIN — Medication 10 ML: at 05:28

## 2019-02-06 RX ADMIN — MEROPENEM 500 MG: 500 INJECTION, POWDER, FOR SOLUTION INTRAVENOUS at 00:35

## 2019-02-06 RX ADMIN — INSULIN LISPRO 4 UNITS: 100 INJECTION, SOLUTION INTRAVENOUS; SUBCUTANEOUS at 13:11

## 2019-02-06 RX ADMIN — PANTOPRAZOLE SODIUM 40 MG: 40 TABLET, DELAYED RELEASE ORAL at 05:27

## 2019-02-06 NOTE — PROGRESS NOTES
Infectious Disease Progress Note    Today's Date: 2019   Admit Date: 2019    Impression:   · Altered mental status, improved, s/p LP with 10 WBCs, 86% neutrophils, 12% lymphs 2% monos, and 575 RBCs. Protein 8, glucose <1. 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 · ESBL E coli UTI   · Blood glucose 587 on admission; this has improved. Plan:   · E coli is susceptible to fosfomycin, so would discontinue meropenem start fosfomycin 3g every 72 hrs x 2 doses to complete her treatment course. Anti-infectives:   · Vancomycin  · Ceftriaxone  · Ampicillin  · Acyclovir  · Merrem    Subjective: Interval History: Friend is visiting and said he thinks her mentation is baseline. She denies nausea, vomiting, diarrhea, reports loose stool yesterday, denies fevers, chills or sweats. Overall reports that she is feeling better. Allergies   Allergen Reactions    Sulfa (Sulfonamide Antibiotics) Swelling        Review of Systems:  Pertinent items are noted in the History of Present Illness.     Objective:     Visit Vitals  /69 (BP 1 Location: Right arm, BP Patient Position: At rest)   Pulse 60   Temp 98.6 °F (37 °C)   Resp 18   Ht 5' 4\" (1.626 m)   Wt 80.9 kg (178 lb 6.4 oz)   SpO2 96%   BMI 30.62 kg/m²     Temp (24hrs), Av.6 °F (37 °C), Min:98.4 °F (36.9 °C), Max:98.8 °F (37.1 °C)    Lines:  Peripheral IV:       Physical Exam:    General:  Alert, cooperative, no acute distress, appears stated age   Eyes:  Cloudy, with arcus senilus   Mouth/Throat: oral pharynx clear   Neck: Supple   Lungs:   Anterior lungs clear without increased work of breathing; O2 via NC   CV:  RRR without audible murmur   Abdomen:   Soft, non-tender, bowel sounds active   Extremities: No cyanosis, but with trace bilateral pedal edema   Skin: Skin color, texture, turgor normal, no rash   Musculoskeletal: No swelling or deformity   Lines/Devices:  Intact, no erythema, drainage or tenderness   Psych: Alert and cooperative, appropriate       Data Review:     CBC:  Recent Labs     02/04/19  0725   WBC 5.6   GRANS 67   MONOS 10   EOS 4   ANEU 3.8   ABL 1.0   HGB 9.9*   HCT 32.1*          BMP:  Recent Labs     02/04/19  0725   CREA 1.44*   BUN 20      K 3.8   *   CO2 27   AGAP 6*   *       LFTS:  No results for input(s): TBILI, ALT, SGOT, AP, TP, ALB in the last 72 hours.     Microbiology:     All Micro Results     Procedure Component Value Units Date/Time    CULTURE, URINE [[id number]]  (Abnormal)  (Susceptibility) Collected:  01/30/19 1220    Order Status:  Completed Specimen:  Urine from Clean catch Updated:  02/06/19 1044     Special Requests: NO SPECIAL REQUESTS        Culture result:       >100,000 COLONIES/mL ESCHERICHIA COLI ** (EXTENDED SPECTRUM BETA LACTAMASE ) **                  RESULTS VERIFIED, PHONED TO AND READ BACK BY  Xiomara Ruiz, RN @ 6338 ON 2/2/2019 BY [facility]              ** MULTI-DRUG RESISTANT ORGANISM **          CULTURE, CSF Artur Hurtado STAIN [[id number]] Collected:  02/01/19 1610    Order Status:  Completed Specimen:  Cerebrospinal Fluid Updated:  02/06/19 1030     Special Requests: NO SPECIAL REQUESTS        GRAM STAIN 0 TO 2 WBCS SEEN PER OIF      NO DEFINITE ORGANISM SEEN        Culture result: NO GROWTH 4 DAYS       MENINGITIS PATHOGENS PANEL, CSF (BY PCR) [[id number]]     Order Status:  Sent Specimen:  Cerebrospinal Fluid     CRYPTOCOCCAL AG, CSF W/REFLEX TITER [[id number]] Collected:  02/01/19 1610    Order Status:  Completed Specimen:  Serum Updated:  02/01/19 1723     Cryptococcus Ag, CSF NEGATIVE        HSV 1 AND 2 BY PCR [[id number]] Collected:  02/01/19 1635    Order Status:  Canceled Specimen:  Other     MENINGITIS PATHOGENS PANEL, CSF (BY PCR) [[id number]] Collected:  02/01/19 1545    Order Status:  Canceled Specimen:  Cerebrospinal Fluid     CSF HOLD [[id number]]     Order Status:  Sent Specimen:  Cerebrospinal Fluid     CRYPTOCOCCUS AG W/REFLEX TITER [[id number]] Collected: 02/01/19 1530    Order Status:  Canceled Specimen:  Serum from Blood           Imaging:   No new imaging    Signed By: Monalisa Dakin, NP     February 6, 2019

## 2019-02-06 NOTE — PROGRESS NOTES
Hospitalist Progress Note     Admit Date:  2019 10:03 AM   Name:  Kenyatta Vernon   Age:  [de-identified] y.o.  :  1940   MRN:  [id number]   PCP:  Anai Lynn MD  Treatment Team: Attending Provider: Sourav Bacon MD; Utilization Review: Eileen Canales RN; Primary Nurse: Chana Wang; Care Manager: José Manuel Dela Cruz [facility] [place]; Consulting Provider: Patria Walters MD; Charge Nurse: Lynnette Pelletier Speech Language Pathologist: LOI Jade    Subjective:   Pt is an [de-identified] y/o F with DM, HTN, recurrent UTIs on suppressive macrobid, who presented from home with altered mental status. Was severely altered and could not respond or make eye contact. She has a caretaker who helps her in the morning but otherwise lives alone. Was admitted with UTI and AoCKD. Started on rocephin, IVF. Zosyn was ordered due to failure to improve and recurrent low grade fever. Was extremely lethargic so neuro consulted on . Concern for meningitis so CSF obtained which had glc<1. Pt started on vanc/rocephin/amp/acyclovir after CSF obtained. MRI unremarkable. EEG showed severe encephalopathy. ID consulted - lower suspicion for meningitis. Her mental status vastly improved on . No new issues. Good appetite. Denies SOB, CP, N/V.     Objective:     Patient Vitals for the past 24 hrs:   Temp Pulse Resp BP SpO2   19 0649 98.6 °F (37 °C) 60 18 130/69 96 %   19 0429 98.6 °F (37 °C) 60 18 127/70 95 %   19 2356 98.7 °F (37.1 °C) 61 19 134/57 100 %   19 1916 98.8 °F (37.1 °C) 68 20 148/74 95 %   19 1518 98.4 °F (36.9 °C) 64 20 157/75 97 %   19 1138 98.5 °F (36.9 °C) 63 18 147/68 94 %     Oxygen Therapy  O2 Sat (%): 96 % (19 0649)  Pulse via Oximetry: 80 beats per minute (19)  O2 Device: Nasal cannula (19 114)  O2 Flow Rate (L/min): 2 l/min (19 114)    Intake/Output Summary (Last 24 hours) at 2019 1031  Last data filed at 2019 8393  Gross per 24 hour   Intake --   Output 2250 ml   Net -2250 ml       *Note that automatically entered I/Os may not be accurate; dependent on patient compliance with collection and accurate  by assistants. Physical Exam:  General:    Well nourished. Alert. No distress  CV:   Elbridge Loosen, reg. No m/r/g. Lungs:  CTAB. No wheezing, rhonchi, or rales. Extremities: Warm and dry. No cyanosis or edema. Skin:     No rashes or jaundice. Normal coloration  Neuro:  Awake, alert, oriented x 3    I reviewed the labs, imaging, EKGs, telemetry, and other studies done this admission.   Data Review:   Recent Results (from the past 24 hour(s))   GLUCOSE, POC    Collection Time: 02/05/19 11:55 AM   Result Value Ref Range    Glucose (POC) 199 (H) 65 - 100 mg/dL   GLUCOSE, POC    Collection Time: 02/05/19  4:42 PM   Result Value Ref Range    Glucose (POC) 374 (H) 65 - 100 mg/dL   GLUCOSE, POC    Collection Time: 02/05/19  8:21 PM   Result Value Ref Range    Glucose (POC) 400 (H) 65 - 100 mg/dL   GLUCOSE, POC    Collection Time: 02/06/19  7:01 AM   Result Value Ref Range    Glucose (POC) 78 65 - 100 mg/dL       All Micro Results     Procedure Component Value Units Date/Time    CULTURE, CSF Llyodiel Jefferson STAIN [[id number]] Collected:  02/01/19 1610    Order Status:  Completed Specimen:  Cerebrospinal Fluid Updated:  02/06/19 1030     Special Requests: NO SPECIAL REQUESTS        GRAM STAIN 0 TO 2 WBCS SEEN PER OIF      NO DEFINITE ORGANISM SEEN        Culture result: NO GROWTH 4 DAYS       CULTURE, URINE [[id number]]  (Abnormal)  (Susceptibility) Collected:  01/30/19 1220    Order Status:  Completed Specimen:  Urine from Clean catch Updated:  02/04/19 1147     Special Requests: NO SPECIAL REQUESTS        Culture result:       >100,000 COLONIES/mL ESCHERICHIA COLI ** (EXTENDED SPECTRUM BETA LACTAMASE ) **                  RESULTS VERIFIED, PHONED TO AND READ BACK BY  Buster Dave RN @ 7466 ON 2/2/2019 BY [facility] FOSFOMYCIN TO FOLLOW    MENINGITIS PATHOGENS PANEL, CSF (BY PCR) [[id number]]     Order Status:  Sent Specimen:  Cerebrospinal Fluid     CRYPTOCOCCAL AG, CSF W/REFLEX TITER [[id number]] Collected:  02/01/19 1610    Order Status:  Completed Specimen:  Serum Updated:  02/01/19 1723     Cryptococcus Ag, CSF NEGATIVE        HSV 1 AND 2 BY PCR [[id number]] Collected:  02/01/19 1635    Order Status:  Canceled Specimen:  Other     MENINGITIS PATHOGENS PANEL, CSF (BY PCR) [[id number]] Collected:  02/01/19 1545    Order Status:  Canceled Specimen:  Cerebrospinal Fluid     CSF HOLD [[id number]]     Order Status:  Sent Specimen:  Cerebrospinal Fluid     CRYPTOCOCCUS AG W/REFLEX TITER [[id number]] Collected:  02/01/19 1530    Order Status:  Canceled Specimen:  Serum from Blood           Current Facility-Administered Medications   Medication Dose Route Frequency    insulin glargine (LANTUS) injection 55 Units  55 Units SubCUTAneous QHS    insulin lispro (HUMALOG) injection 10 Units  10 Units SubCUTAneous TIDAC    losartan (COZAAR) tablet 50 mg  50 mg Oral DAILY    meropenem (MERREM) 500 mg in 0.9% sodium chloride (MBP/ADV) 50 mL  0.5 g IntraVENous Q8H    LORazepam (ATIVAN) injection 2 mg  2 mg IntraVENous Q4H PRN    sodium chloride (NS) flush 5-40 mL  5-40 mL IntraVENous Q8H    sodium chloride (NS) flush 5-40 mL  5-40 mL IntraVENous PRN    aspirin delayed-release tablet 81 mg  81 mg Oral DAILY    colchicine tablet 0.6 mg  0.6 mg Oral TID PRN    pantoprazole (PROTONIX) tablet 40 mg  40 mg Oral ACB    rOPINIRole (REQUIP) tablet 0.5 mg  0.5 mg Oral QHS PRN    rosuvastatin (CRESTOR) tablet 20 mg  20 mg Oral QHS    traMADol (ULTRAM) tablet 50 mg  50 mg Oral Q8H PRN    sodium chloride (NS) flush 5-40 mL  5-40 mL IntraVENous Q8H    sodium chloride (NS) flush 5-40 mL  5-40 mL IntraVENous PRN    acetaminophen (TYLENOL) tablet 650 mg  650 mg Oral Q4H PRN    HYDROcodone-acetaminophen (NORCO) 5-325 mg per tablet 1 Tab  1 Tab Oral Q4H PRN    naloxone (NARCAN) injection 0.4 mg  0.4 mg IntraVENous PRN    diphenhydrAMINE (BENADRYL) capsule 25 mg  25 mg Oral Q4H PRN    ondansetron (ZOFRAN) injection 4 mg  4 mg IntraVENous Q4H PRN    senna-docusate (PERICOLACE) 8.6-50 mg per tablet 2 Tab  2 Tab Oral DAILY PRN    LORazepam (ATIVAN) tablet 0.5 mg  0.5 mg Oral Q4H PRN    zolpidem (AMBIEN) tablet 5 mg  5 mg Oral QHS PRN    insulin lispro (HUMALOG) injection   SubCUTAneous AC&HS    dextrose 40% (GLUTOSE) oral gel 1 Tube  15 g Oral PRN    glucagon (GLUCAGEN) injection 1 mg  1 mg IntraMUSCular PRN    dextrose (D50W) injection syrg 12.5-25 g  25-50 mL IntraVENous PRN    bisacodyl (DULCOLAX) tablet 5 mg  5 mg Oral DAILY PRN    alum-mag hydroxide-simeth (MYLANTA) oral suspension 30 mL  30 mL Oral Q4H PRN    hydrALAZINE (APRESOLINE) 20 mg/mL injection 20 mg  20 mg IntraVENous Q4H PRN       Other Studies:  No results found.     Assessment and Plan:     Hospital Problems as of 2/6/2019 Date Reviewed: 1/30/2019          Codes Class Noted - Resolved POA    Meningitis ICD-10-CM: G03.9  ICD-9-CM: 322.9  2/3/2019 - Present Yes        Seizure-like activity ([facility] ICD-10-CM: R56.9  ICD-9-CM: 780.39  2/1/2019 - Present Yes        Hyperglycemia due to type 2 diabetes mellitus ([facility] ICD-10-CM: E11.65  ICD-9-CM: 250.00  1/30/2019 - Present Yes        * (Principal) Acute metabolic encephalopathy ZAQ-61-FP: G93.41  ICD-9-CM: 348.31  1/30/2019 - Present Yes        Acute cystitis without hematuria ICD-10-CM: N30.00  ICD-9-CM: 595.0  1/30/2019 - Present Yes        Uncontrolled type 2 diabetes mellitus with hyperglycemia (HCC) (Chronic) ICD-10-CM: E11.65  ICD-9-CM: 250.02  6/15/2015 - Present Yes        Essential hypertension (Chronic) ICD-10-CM: I10  ICD-9-CM: 401.9  6/15/2015 - Present Yes        Chronic kidney disease, stage III (moderate) (HCC) (Chronic) ICD-10-CM: N18.3  ICD-9-CM: 585.3  6/15/2015 - Present Yes        RESOLVED: Acute renal failure superimposed on stage 3 chronic kidney disease ([facility] ICD-10-CM: N17.9, N18.3  ICD-9-CM: 584.9, 585.3  1/31/2019 - 2/3/2019 Yes              Acute metabolic encephalopathy  Lower suspicion for meningitis. Likely secondary to UTI. -MRI no acute abnormalities  -abx changed to merrem on 2/3 per ID    ESBL E coli in Urine  -Merrem per ID    Possible seizure activity  -monitor for recurrence  -PRN ativan    HyperNa  -resolved    DM2 with hyperglycemia  Fasting glucoses low but with high to the 400s in the afternoon. Noncompliant with dietary restrictions. - lantus 55 units  - add prandial insulin  -ISS    HTN  - cont losartan  - holding home HCTZ, lasix   - held metoprolol due to bradycardia.   - PRN hydralazine IV    AoCKD  -at baseline now    Discharge planning:  [facility] says pt has SNF bed, awaiting clearance from ID    Signed:  Harjeet Irwin MD

## 2019-02-06 NOTE — PROGRESS NOTES
Problem: Dysphagia (Adult)  Goal: *Acute Goals and Plan of Care (Insert Text)  STG: Pt will demonstrate zero signs/sx of aspiration with mechanical soft textures with thin liquids with no straws with 90% accuracy during all meals. STG: Pt will complete a full speech, language and cognitive evaluation without assistance with 100% accuracy during session. Goal Met 2/4/19  STG: Pt will complete a Modified barium swallow study with zero signs/sx of aspiration  with 100% accuracy during swallow study. LTG: Pt will demonstrate zero signs/sx of aspiration with least restrictive diet with 100% accuracy during all meals. LTG: Pt will increase cognitive linguistic function to improve communication across environments   STG:Patient will repeat at phrase level with 80% accuracy given minimal cueing  STG: Patient will complete naming tasks (divergent, convergent) with 80% accuracy given moderate cueing  STG: Patient will recall items with delay with 80% accuracy given minimal cueing  STG: Patient will complete basic problem solving tasks with 70% accuracy given moderate cueing   STG: Patient will participate in further evaluation of cognitive linguistic function for therapeutic assessment   STG: Patient will complete visual problem solving/reasoning tasks with 75% accuracy given moderate cueing     Speech language pathology: bedside swallow and speech language note: Daily Note and Discharge 2    NAME/AGE/GENDER: Kate Kitcristy is a [de-identified] y.o. female  DATE: 2/6/2019  PRIMARY DIAGNOSIS: Hyperglycemia due to type 2 diabetes mellitus ([facility]  Acute metabolic encephalopathy  Procedure(s) (LRB):  MISC ANESTHESIA (N/A) 4 Days Post-Op  ICD-10: Treatment Diagnosis: R13.12 oropharyngeal phase dysphagia, Cognitive communication deficit R41.841  INTERDISCIPLINARY COLLABORATION: Registered NurseASSESSMENT:Pt oriented x 4. Pt tolerated thin with no overt signs. sx. No further ST indicated.      · continue prescribed diet  MEDICATIONS:  · Crushed in puree  · With liquid  SUBJECTIVE:   Oriented x4. History of Present Injury/Illness: Ms. Patty Baltazar  has a past medical history of CAD (coronary artery disease), DM2 (diabetes mellitus, type 2) ([address] Gout, HLD (hyperlipidemia), HTN (hypertension), and Peripheral neuropathy. .  She also  has a past surgical history that includes pr cardiac surg procedure unlist; hx cholecystectomy; hx tubal ligation; and hx coronary artery bypass graft. Present Symptoms: dysphagia, cognition    Pain Intensity 1: 0  Current Medications:   No current facility-administered medications on file prior to encounter. Current Outpatient Medications on File Prior to Encounter   Medication Sig Dispense Refill    nitrofurantoin (MACRODANTIN) 50 mg capsule Take 1 Cap by mouth nightly. 30 Cap 3    insulin glargine (LANTUS SOLOSTAR) 100 unit/mL (3 mL) pen 34 Units by SubCUTAneous route nightly. 1 Package 5    insulin lispro (HUMALOG KWIKPEN) 100 unit/mL kwikpen 24 Units SubQ with Breakfast; 24 Units SubQ with Lunch; 24 Units SubQ with Dinner. 1 Package 5    hydrochlorothiazide (HYDRODIURIL) 25 mg tablet Take 1 Tab by mouth daily. 90 Tab 1    metoprolol (LOPRESSOR) 25 mg tablet Take 1 Tab by mouth two (2) times a day. 60 Tab 5    Blood-Glucose Meter (ONETOUCH ULTRAMINI) monitoring kit Use as directed 1 Kit 0    glucose blood VI test strips (ONETOUCH ULTRA TEST) strip Test 4 times daily as directed 100 Strip 11    Lancets (ONETOUCH ULTRASOFT LANCETS) misc Test 4 times daily as directed 100 Each 11    losartan (COZAAR) 100 mg tablet Take  by mouth daily.  furosemide (LASIX) 20 mg tablet Take  by mouth two (2) times a day.  Omeprazole delayed release (PRILOSEC D/R) 20 mg tablet Take 1 Tab by mouth daily. 30 Tab 5    rosuvastatin (CRESTOR) 20 mg tablet Take 1 Tab by mouth nightly. 90 Tab 1    gabapentin (NEURONTIN) 100 mg capsule Take 1 Cap by mouth three (3) times daily.  30 Cap 5    colchicine (COLCRYS) 0.6 mg tablet Take 0.6 mg by mouth three (3) times daily as needed.  traMADol (ULTRAM) 50 mg tablet Take 1 Tab by mouth every eight (8) hours as needed for Pain. 90 Tab 5    aspirin delayed-release 81 mg tablet Take  by mouth daily.  rOPINIRole (REQUIP) 0.5 mg tablet Take 1 Tab by mouth nightly as needed. 90 Tab 1     Current Dietary Status:  Mechanical soft/Thin         Social History/Home Situation:    Home Environment: Apartment  One/Two Story Residence: One story  Living Alone: Yes  Support Systems: Family member(s), Friends \ neighbors  Patient Expects to be Discharged to[de-identified] Rehabilitation facility  Current DME Used/Available at Home: Walker, rolling, Cane, straight, Wheelchair  OBJECTIVE:   Respiratory Status:          Oral Motor Structure/Speech:  Oral-Motor Structure/Motor Speech  Labial: Decreased rate  Oral Hygiene: adequate  Lingual: Incoordinated    Tool Used: Dysphagia Outcome and Severity Scale (ELKE)    Score Comments   Normal Diet  [] 7 With no strategies or extra time needed       Functional Swallow  [] 6 May have mild oral or pharyngeal delay       Mild Dysphagia    [] 5 Which may require one diet consistency restricted (those who demonstrate penetration which is entirely cleared on MBS would be included)   Mild-Moderate Dysphagia  [x] 4 With 1-2 diet consistencies restricted       Moderate Dysphagia  [] 3 With 2 or more diet consistencies restricted       Moderately Severe Dysphagia  [] 2 With partial PO strategies (trials with ST only)       Severe Dysphagia  [] 1 With inability to tolerate any PO safely          Score:  Initial: 4 Most Recent: 5 (Date: 2/4/19 )   Interpretation of Tool: The Dysphagia Outcome and Severity Scale (ELKE) is a simple, easy-to-use, 7-point scale developed to systematically rate the functional severity of dysphagia based on objective assessment and make recommendations for diet level, independence level, and type of nutrition.      Score 7 6 5 4 3 2 1   Modifier CH CI CJ CK CL CM CN     Payor: [facility] MEDICARE / Plan: [address] / Product Type: Managed Care Medicare /     ________________________________________________________________________________________________    Safety:   After treatment position/precautions:  Upright in bed  No further ST indicated.   Total Treatment Duration:  Time in 9:00  Time out 9:10   Kaia Alvarez MA/HARPREET/SLP

## 2019-02-06 NOTE — DISCHARGE SUMMARY
Hospitalist Discharge Summary     Patient ID:  Rocael Kebede  [id number]  66 y.o.  1940  Admit date: 1/30/2019 10:03 AM  Discharge date and time: 2/6/2019  Attending: Kalyan Mcgovern MD  PCP:  Jenae Dee MD  Treatment Team: Attending Provider: Kalyan Mcgovern MD; Utilization Review: Lindsay Hanson RN; Primary Nurse: Gina Luu; Care Manager: Gonzalez Dunham [facility]; Consulting Provider: Javier Wall MD; Charge Nurse: Yoana Adamson Speech Language Pathologist: LOI Moreno    Principal Diagnosis Acute metabolic encephalopathy   Principal Problem:    Acute metabolic encephalopathy (7/22/2731)    Active Problems:    Uncontrolled type 2 diabetes mellitus with hyperglycemia ([address] (6/15/2015)      Essential hypertension (6/15/2015)      Chronic kidney disease, stage III (moderate) ([address] (6/15/2015)      Hyperglycemia due to type 2 diabetes mellitus ([address] (1/30/2019)      Acute cystitis without hematuria (1/30/2019)      Seizure-like activity ([address] (2/1/2019)      Meningitis (2/3/2019)             Hospital Course:  Please refer to the admission H&P for details of presentation. In summary, the patient is [de-identified] y/o F with DM, HTN, recurrent UTIs on suppressive macrobid, who presented from home with altered mental status. Was severely altered and could not respond or make eye contact. She has a caretaker who helps her in the morning but otherwise lives alone. Was admitted with UTI and AoCKD. Started on rocephin, IVF. Zosyn was ordered due to failure to improve and recurrent low grade fever. Was extremely lethargic so neuro consulted on 2/1. Concern for meningitis so CSF obtained which had glc<1. Pt started on vanc/rocephin/amp/acyclovir after CSF obtained. MRI unremarkable. EEG showed severe encephalopathy. ID consulted - lower suspicion for meningitis. Her mental status vastly improved on 2/2. Acute metabolic encephalopathy  Lower suspicion for meningitis.  Likely secondary to UTI. -MRI no acute abnormalities     ESBL E coli in Urine  - continued Merrem per ID while inpt, change to fosfomycin to complete 2 additional doses at discharge     HyperNa  -resolved     DM2 with hyperglycemia  Fasting glucoses low but with high to the 400s in the afternoon. Noncompliant with dietary restrictions. Nsg has found box of muffins under her bed. - decrease lantus to 50 units  - SSI  - encourage dietary compliance    HTN  - cont losartan, lasix, metoprolol      AoCKD  -at baseline now      Significant Diagnostic Studies:   MRI BRAIN W WO CONT   Final Result   IMPRESSION: Old right occipital infarct. No evidence of acute intracranial   abnormality         IR SPINAL PUNCTURE LUMBAR DIAGNOSTIC   Final Result   Impression: Uncomplicated lumbar puncture. Plan: The patient will recover at bedrest for 1 hours in her hospital room. Recommend no additional anticoagulation for 24 hours. CT HEAD WO CONT   Final Result   IMPRESSION:  Negative for acute intracranial abnormality. Chronic changes. Labs: Results:       Chemistry Recent Labs     02/04/19  0725   *      K 3.8   *   CO2 27   BUN 20   CREA 1.44*   CA 8.0*   AGAP 6*      CBC w/Diff Recent Labs     02/04/19  0725   WBC 5.6   RBC 3.29*   HGB 9.9*   HCT 32.1*      GRANS 67   LYMPH 19   EOS 4      Cardiac Enzymes No results for input(s): CPK, CKND1, MARIO ALBERTO in the last 72 hours. No lab exists for component: CKRMB, TROIP   Coagulation No results for input(s): PTP, INR, APTT in the last 72 hours.     No lab exists for component: INREXT    Lipid Panel Lab Results   Component Value Date/Time    Cholesterol, total 221 (H) 03/09/2015 02:42 PM    HDL Cholesterol 64 03/09/2015 02:42 PM    LDL, calculated 114 03/09/2015 02:42 PM    VLDL, calculated 43 (H) 03/09/2015 02:42 PM    Triglyceride 217 (H) 03/09/2015 02:42 PM    CHOL/HDL Ratio 3.5 03/09/2015 02:42 PM      BNP No results for input(s): BNPP in the last 72 hours. Liver Enzymes No results for input(s): TP, ALB, TBIL, AP, SGOT, GPT in the last 72 hours. No lab exists for component: DBIL   Thyroid Studies Lab Results   Component Value Date/Time    TSH 1.260 01/30/2019 10:32 AM            Discharge Exam:  Visit Vitals  /67 (BP 1 Location: Right arm, BP Patient Position: At rest)   Pulse 62   Temp 98.4 °F (36.9 °C)   Resp 18   Ht 5' 4\" (1.626 m)   Wt 80.9 kg (178 lb 6.4 oz)   SpO2 99%   BMI 30.62 kg/m²     General:          Well nourished. Alert. No distress  CV:                  Elbridge Loosen, reg. No m/r/g. Lungs:             CTAB. No wheezing, rhonchi, or rales. Extremities:     Warm and dry. No cyanosis or edema. Skin:                No rashes or jaundice. Normal coloration  Neuro:             Awake, alert, oriented x 3        Disposition: SNF  Discharge Condition: stable  Patient Instructions:   Current Discharge Medication List      START taking these medications    Details   insulin glargine (LANTUS) 100 unit/mL injection 50 Units by SubCUTAneous route nightly. Qty: 1 Vial, Refills: 0      insulin lispro (HUMALOG) 100 unit/mL injection Per SSI  Qty: 1 Vial, Refills: 0      fosfomycin (MONUROL) 3 gram pack oral packet Take 1 Packet by mouth every seventy-two (72) hours for 2 doses. Qty: 2 Packet, Refills: 0         CONTINUE these medications which have NOT CHANGED    Details   metoprolol (LOPRESSOR) 25 mg tablet Take 1 Tab by mouth two (2) times a day.   Qty: 60 Tab, Refills: 5      Blood-Glucose Meter (ONETOUCH ULTRAMINI) monitoring kit Use as directed  Qty: 1 Kit, Refills: 0    Comments: Dx: 250.00 insulin based      glucose blood VI test strips (ONETOUCH ULTRA TEST) strip Test 4 times daily as directed  Qty: 100 Strip, Refills: 11    Comments: Dx: 250.00 insulin based      Lancets (ONETOUCH ULTRASOFT LANCETS) misc Test 4 times daily as directed  Qty: 100 Each, Refills: 11    Comments: Dx: 250.00 insulin based      losartan (COZAAR) 100 mg tablet Take  by mouth daily. Associated Diagnoses: CAD (coronary artery disease); HTN (hypertension)      furosemide (LASIX) 20 mg tablet Take  by mouth two (2) times a day. Associated Diagnoses: HTN (hypertension)      Omeprazole delayed release (PRILOSEC D/R) 20 mg tablet Take 1 Tab by mouth daily. Qty: 30 Tab, Refills: 5    Associated Diagnoses: GERD (gastroesophageal reflux disease)      rosuvastatin (CRESTOR) 20 mg tablet Take 1 Tab by mouth nightly. Qty: 90 Tab, Refills: 1    Associated Diagnoses: CAD (coronary artery disease); HLD (hyperlipidemia)      gabapentin (NEURONTIN) 100 mg capsule Take 1 Cap by mouth three (3) times daily. Qty: 30 Cap, Refills: 5    Associated Diagnoses: Peripheral neuropathy      colchicine (COLCRYS) 0.6 mg tablet Take 0.6 mg by mouth three (3) times daily as needed. aspirin delayed-release 81 mg tablet Take  by mouth daily. Associated Diagnoses: CAD (coronary artery disease)      rOPINIRole (REQUIP) 0.5 mg tablet Take 1 Tab by mouth nightly as needed.   Qty: 90 Tab, Refills: 1    Associated Diagnoses: RLS (restless legs syndrome)         STOP taking these medications       nitrofurantoin (MACRODANTIN) 50 mg capsule Comments:   Reason for Stopping:         insulin glargine (LANTUS SOLOSTAR) 100 unit/mL (3 mL) pen Comments:   Reason for Stopping:         insulin lispro (HUMALOG KWIKPEN) 100 unit/mL kwikpen Comments:   Reason for Stopping:         hydrochlorothiazide (HYDRODIURIL) 25 mg tablet Comments:   Reason for Stopping:         traMADol (ULTRAM) 50 mg tablet Comments:   Reason for Stopping:               Activity: Activity as tolerated  Diet: Diabetic Diet  Wound Care: None needed    Follow-up with PCP in 1 week  ·     Time spent to discharge patient 36 minutes  Signed:  Everardo Roche MD  2/6/2019  1:00 PM

## 2019-02-06 NOTE — PROGRESS NOTES
Hourly rounds completed shift. All needs met. Bed low/locked. No c/o pain. Pt alert/oriented throughout the night. Call light in reach. Will continue to monitor pt and give report to oncoming RN. Peripheral IV 02/02/19 Anterior;Proximal;Right Forearm (Active)   Site Assessment Clean, dry, & intact 2/6/2019  2:06 AM   Phlebitis Assessment 0 2/6/2019  2:06 AM   Infiltration Assessment 0 2/6/2019  2:06 AM   Dressing Status Clean, dry, & intact 2/6/2019  2:06 AM   Dressing Type Transparent;Tape 2/6/2019  2:06 AM   Hub Color/Line Status Pink;Flushed;Patent 2/6/2019  2:06 AM   Action Taken Dressing changed 2/5/2019 11:24 PM   Alcohol Cap Used No 2/5/2019 11:24 PM       Peripheral IV 02/03/19 Distal;Right Arm (Active)   Site Assessment Clean, dry, & intact 2/6/2019  2:06 AM   Phlebitis Assessment 0 2/6/2019  2:06 AM   Infiltration Assessment 0 2/6/2019  2:06 AM   Dressing Status Clean, dry, & intact 2/6/2019  2:06 AM   Dressing Type Transparent;Tape 2/6/2019  2:06 AM   Hub Color/Line Status Blue;Flushed;Patent 2/6/2019  2:06 AM   Alcohol Cap Used No 2/5/2019 11:24 PM       Peripheral IV 02/03/19 Left;Posterior Forearm (Active)   Site Assessment Clean, dry, & intact 2/6/2019  2:06 AM   Phlebitis Assessment 0 2/6/2019  2:06 AM   Infiltration Assessment 0 2/6/2019  2:06 AM   Dressing Status Clean, dry, & intact 2/6/2019  2:06 AM   Dressing Type Transparent;Tape 2/6/2019  2:06 AM   Hub Color/Line Status Blue; Infusing;Patent 2/6/2019  2:06 AM   Alcohol Cap Used No 2/5/2019 11:24 PM

## 2019-02-07 ENCOUNTER — PATIENT OUTREACH (OUTPATIENT)
Dept: CASE MANAGEMENT | Age: 79
End: 2019-02-07

## 2019-02-07 LAB
BACTERIA SPEC CULT: NORMAL
GRAM STN SPEC: NORMAL
GRAM STN SPEC: NORMAL
SERVICE CMNT-IMP: NORMAL

## 2019-02-07 NOTE — PROGRESS NOTES
This note will not be viewable in [address] Patient discharged to [facility] on 2/6/19. JESICA outreach postponed for 21 days due to discharge to non-preferred network SNF.

## 2019-02-28 ENCOUNTER — PATIENT OUTREACH (OUTPATIENT)
Dept: CASE MANAGEMENT | Age: 79
End: 2019-02-28

## 2019-02-28 NOTE — PROGRESS NOTES
This note will not be viewable in [address] Transition of Care Discharge Follow-up Questionnaire Date/Time of Call: 
 2/28/19 2:05pm  
What was the patient hospitalized for? Acute metabolic encephalopathy d/c Viktoria Rodríguez 2.6.19 
d/c home Does the patient understand his/her diagnosis and/or treatment and what happened during the hospitalization? Yes, spoke with patients daughter, Moe Johnson, she states understanding of diagnosis and treatment; and is agreeable to call. Moe Johnson states patient is doing well Did the patient receive discharge instructions? Yes   
CM Assessed Risk for Readmission:  
 
 
Patient stated Risk for Readmission: Low r/t diagnosis and/or comorbidities None stated Review any discharge instructions (see discharge instructions/AVS in [facility]). Ask patient if they understand these. Do they have any questions? Reviewed, understanding is stated, no questions at this time Were home services ordered (nursing, PT, OT, ST, etc.)? Yes, Moe Johnson is unsure of the provider If so, has the first visit occurred? If not, why? (Assist with coordination of services if necessary.) Yes, a lady was there today Was any DME ordered? No  
  
If so, has it been received? If not, why?  (Assist patient in obtaining DME orders &/or equipment if necessary.) N/A Complete a review of all medications (new, continued and discontinued meds per the D/C instructions and medication tab in [facility]). Completed Were all new prescriptions filled? If not, why?  (Assist patient in obtaining medications if necessary  escalate for CCM &/or SW if ongoing issues are verbalized by pt or anticipated) Moe Johnson states patient has medications. Moe Johnson stated was only given 1 insulin pen by [facility] on discharge and normally 2 come in a pack, suggested she contact  SW to inquire, she stated understanding and was agreeable Does the patient understand the purpose and dosing instructions for all medications? (If patient has questions, provide explanation and education.) Yes Does the patient have any problems in performing ADLs? (If patient is unable to perform ADLs  what is the limiting factor(s)? Do they have a support system that can assist? If no support system is present, discuss possible assistance that they may be able to obtain. Escalate for CCM/SW if ongoing issues are verbalized by pt or anticipated) Independent with [place] Leader assists if/when needed Does the patient have all follow-up appointments scheduled? 7 day f/up with PCP?  
(f/up with PCP may be w/in 14 days if patient has a f/up with their specialist w/in 7 days) 7-14 day f/up with specialist?  
(or per discharge instructions) If f/up has not been made  what actions has the care coordinator made to accomplish this? Has transportation been arranged? Yes Dr. Bee Garay- per [facility] not yet scheduled, advised re: 7day follow after discharge Yes, no transportation needs were identified at this time. Any other questions or concerns expressed by the patient? No other needs or concerns identified. [facility] states her gratitude for follow up. Contact information for [facility] was given, instructed to call with new questions or concerns. Schedule next appointment with IESHA Mccord or refer to RN Case Manager/ per the workflow guidelines. When is care coordinators next follow-up call scheduled? If referred for CCM  what RN care manager was the referral assigned? Community Care Coordinator will follow per workflow guidelines Within 30 days JESICA Call Completed By: Saroj Small LPN Community Care Coordinator

## 2019-03-21 ENCOUNTER — PATIENT OUTREACH (OUTPATIENT)
Dept: CASE MANAGEMENT | Age: 79
End: 2019-03-21

## 2019-03-21 NOTE — PROGRESS NOTES
This note will not be viewable in [address] Transitions of Care  Follow up Outreach Note Outreach type Phone call: spoke with patient Home visit:  
Date/Time of Outreach: 3/21/19  1:59pm  
 
Has patient attended PCP or specialist follow-up appointments since last contact? What was outcome of appointment? When is next follow-up scheduled? Patient states she  is doing well, states she did follow up with pcp, Dr. Delores Morris Review medications. Any medication changes since last outreach? Does patient have any questions or issues related to their medications? No 
 
 
No   
 
Home health active? If yes  any issue? Progress? Yes, patient has and aide Referrals needed? 
(CM, SW, HH, etc. ) No  
  
Other issues/Miscellaneous? (Transportation, access to meals, ability to perform ADLs, adequate caregiver support, etc.) No other needs or concerns at this time. Patient states her gratitude for follow up. Next Outreach Scheduled? Graduation from program? 
 N/A Yes Next Steps/Goals (if applicable): N/A Outreach completed by: 
 Angel Kwong LPN Community Care Coordinator

## 2019-06-19 ENCOUNTER — ANESTHESIA EVENT (OUTPATIENT)
Dept: ENDOSCOPY | Age: 79
End: 2019-06-19
Payer: MEDICARE

## 2019-06-19 ENCOUNTER — ANESTHESIA (OUTPATIENT)
Dept: ENDOSCOPY | Age: 79
End: 2019-06-19
Payer: MEDICARE

## 2019-06-19 ENCOUNTER — HOSPITAL ENCOUNTER (OUTPATIENT)
Age: 79
Setting detail: OUTPATIENT SURGERY
Discharge: HOME | End: 2019-06-19
Attending: INTERNAL MEDICINE | Admitting: INTERNAL MEDICINE
Payer: MEDICARE

## 2019-06-19 VITALS
RESPIRATION RATE: 14 BRPM | HEIGHT: 63 IN | BODY MASS INDEX: 31.18 KG/M2 | OXYGEN SATURATION: 93 % | SYSTOLIC BLOOD PRESSURE: 139 MMHG | TEMPERATURE: 97 F | WEIGHT: 176 LBS | DIASTOLIC BLOOD PRESSURE: 63 MMHG | HEART RATE: 89 BPM

## 2019-06-19 LAB — GLUCOSE BLD STRIP.AUTO-MCNC: 192 MG/DL (ref 65–100)

## 2019-06-19 PROCEDURE — 82962 GLUCOSE BLOOD TEST: CPT

## 2019-06-19 PROCEDURE — 74011000250 HC RX REV CODE- 250

## 2019-06-19 PROCEDURE — 76060000032 HC ANESTHESIA 0.5 TO 1 HR: Performed by: INTERNAL MEDICINE

## 2019-06-19 PROCEDURE — 76040000026: Performed by: INTERNAL MEDICINE

## 2019-06-19 PROCEDURE — 74011250636 HC RX REV CODE- 250/636

## 2019-06-19 PROCEDURE — 77030008969: Performed by: INTERNAL MEDICINE

## 2019-06-19 PROCEDURE — 74011250636 HC RX REV CODE- 250/636: Performed by: ANESTHESIOLOGY

## 2019-06-19 RX ORDER — FENTANYL CITRATE 50 UG/ML
INJECTION, SOLUTION INTRAMUSCULAR; INTRAVENOUS
Status: DISCONTINUED
Start: 2019-06-19 | End: 2019-06-19 | Stop reason: WASHOUT

## 2019-06-19 RX ORDER — MIDAZOLAM HYDROCHLORIDE 1 MG/ML
2 INJECTION, SOLUTION INTRAMUSCULAR; INTRAVENOUS
Status: DISCONTINUED | OUTPATIENT
Start: 2019-06-19 | End: 2019-06-19 | Stop reason: HOSPADM

## 2019-06-19 RX ORDER — SODIUM CHLORIDE, SODIUM LACTATE, POTASSIUM CHLORIDE, CALCIUM CHLORIDE 600; 310; 30; 20 MG/100ML; MG/100ML; MG/100ML; MG/100ML
1000 INJECTION, SOLUTION INTRAVENOUS CONTINUOUS
Status: DISCONTINUED | OUTPATIENT
Start: 2019-06-19 | End: 2019-06-19 | Stop reason: HOSPADM

## 2019-06-19 RX ORDER — ONDANSETRON 2 MG/ML
4 INJECTION INTRAMUSCULAR; INTRAVENOUS ONCE
Status: CANCELLED | OUTPATIENT
Start: 2019-06-19 | End: 2019-06-19

## 2019-06-19 RX ORDER — SODIUM CHLORIDE, SODIUM LACTATE, POTASSIUM CHLORIDE, CALCIUM CHLORIDE 600; 310; 30; 20 MG/100ML; MG/100ML; MG/100ML; MG/100ML
75 INJECTION, SOLUTION INTRAVENOUS CONTINUOUS
Status: CANCELLED | OUTPATIENT
Start: 2019-06-19

## 2019-06-19 RX ORDER — LIDOCAINE HYDROCHLORIDE 10 MG/ML
0.1 INJECTION INFILTRATION; PERINEURAL AS NEEDED
Status: DISCONTINUED | OUTPATIENT
Start: 2019-06-19 | End: 2019-06-19 | Stop reason: HOSPADM

## 2019-06-19 RX ORDER — FENTANYL CITRATE 50 UG/ML
100 INJECTION, SOLUTION INTRAMUSCULAR; INTRAVENOUS AS NEEDED
Status: DISCONTINUED | OUTPATIENT
Start: 2019-06-19 | End: 2019-06-19 | Stop reason: HOSPADM

## 2019-06-19 RX ORDER — OXYCODONE HYDROCHLORIDE 5 MG/1
10 TABLET ORAL
Status: CANCELLED | OUTPATIENT
Start: 2019-06-19 | End: 2019-06-20

## 2019-06-19 RX ORDER — DIPHENHYDRAMINE HYDROCHLORIDE 50 MG/ML
12.5 INJECTION, SOLUTION INTRAMUSCULAR; INTRAVENOUS ONCE
Status: CANCELLED | OUTPATIENT
Start: 2019-06-19 | End: 2019-06-19

## 2019-06-19 RX ORDER — NALOXONE HYDROCHLORIDE 0.4 MG/ML
0.1 INJECTION, SOLUTION INTRAMUSCULAR; INTRAVENOUS; SUBCUTANEOUS AS NEEDED
Status: CANCELLED | OUTPATIENT
Start: 2019-06-19 | End: 2019-06-19

## 2019-06-19 RX ORDER — OXYCODONE HYDROCHLORIDE 5 MG/1
5 TABLET ORAL
Status: CANCELLED | OUTPATIENT
Start: 2019-06-19 | End: 2019-06-20

## 2019-06-19 RX ORDER — ACETAMINOPHEN 500 MG
500 TABLET ORAL ONCE
Status: CANCELLED | OUTPATIENT
Start: 2019-06-19 | End: 2019-06-19

## 2019-06-19 RX ORDER — HYDROMORPHONE HYDROCHLORIDE 2 MG/ML
0.5 INJECTION, SOLUTION INTRAMUSCULAR; INTRAVENOUS; SUBCUTANEOUS
Status: CANCELLED | OUTPATIENT
Start: 2019-06-19

## 2019-06-19 RX ORDER — PROPOFOL 10 MG/ML
INJECTION, EMULSION INTRAVENOUS AS NEEDED
Status: DISCONTINUED | OUTPATIENT
Start: 2019-06-19 | End: 2019-06-19 | Stop reason: HOSPADM

## 2019-06-19 RX ORDER — EPHEDRINE SULFATE 50 MG/ML
INJECTION, SOLUTION INTRAVENOUS AS NEEDED
Status: DISCONTINUED | OUTPATIENT
Start: 2019-06-19 | End: 2019-06-19 | Stop reason: HOSPADM

## 2019-06-19 RX ORDER — LIDOCAINE HYDROCHLORIDE 20 MG/ML
INJECTION, SOLUTION EPIDURAL; INFILTRATION; INTRACAUDAL; PERINEURAL AS NEEDED
Status: DISCONTINUED | OUTPATIENT
Start: 2019-06-19 | End: 2019-06-19 | Stop reason: HOSPADM

## 2019-06-19 RX ADMIN — PROPOFOL 60 MG: 10 INJECTION, EMULSION INTRAVENOUS at 13:36

## 2019-06-19 RX ADMIN — PROPOFOL 30 MG: 10 INJECTION, EMULSION INTRAVENOUS at 14:01

## 2019-06-19 RX ADMIN — PROPOFOL 50 MG: 10 INJECTION, EMULSION INTRAVENOUS at 13:33

## 2019-06-19 RX ADMIN — SODIUM CHLORIDE, SODIUM LACTATE, POTASSIUM CHLORIDE, AND CALCIUM CHLORIDE 1000 ML: 600; 310; 30; 20 INJECTION, SOLUTION INTRAVENOUS at 12:55

## 2019-06-19 RX ADMIN — LIDOCAINE HYDROCHLORIDE 50 MG: 20 INJECTION, SOLUTION EPIDURAL; INFILTRATION; INTRACAUDAL; PERINEURAL at 13:03

## 2019-06-19 RX ADMIN — PROPOFOL 30 MG: 10 INJECTION, EMULSION INTRAVENOUS at 14:00

## 2019-06-19 RX ADMIN — PROPOFOL 30 MG: 10 INJECTION, EMULSION INTRAVENOUS at 14:07

## 2019-06-19 RX ADMIN — EPHEDRINE SULFATE 10 MG: 50 INJECTION, SOLUTION INTRAVENOUS at 13:42

## 2019-06-19 RX ADMIN — EPHEDRINE SULFATE 10 MG: 50 INJECTION, SOLUTION INTRAVENOUS at 13:44

## 2019-06-19 RX ADMIN — LIDOCAINE HYDROCHLORIDE 50 MG: 20 INJECTION, SOLUTION EPIDURAL; INFILTRATION; INTRACAUDAL; PERINEURAL at 13:33

## 2019-06-19 RX ADMIN — PROPOFOL 30 MG: 10 INJECTION, EMULSION INTRAVENOUS at 14:02

## 2019-06-19 NOTE — DISCHARGE INSTRUCTIONS
Gastrointestinal Esophagogastroduodenoscopy (EGD) - Upper Exam Discharge Instructions    1. Call Dr. Cristóbal Crews at [phone] for any problems or questions. 2. Contact the doctor's office for follow up appointment as directed. 3. Medication may cause drowsiness for several hours, therefore:  · Do not drive or operate machinery for remainder of the day. · No alcohol today. · Do not make any important or legal decisions for 24 hours. · Do not sign any legal documents for 24 hours. 5. Ordinarily, you may resume regular diet and activity after exam unless otherwise              specified by your physician. 6. For mild soreness in your throat you may use Cepacol throat lozenges or warm               salt-water gargles as needed. Any additional instructions:  FOLLOW UP WITH DR Ajith Winston IN THE OFFICE     Instructions given to Xavier Montiel and other family members.   Instructions given by:

## 2019-06-19 NOTE — H&P
Gastroenterology Outpatient History and Physical 
 
Patient: Demetrius Bhagat Physician: Pieter Pate MD 
 
Chief Complaint: Abnormal CT History of Present Illness: Dilated PD Justification for Procedure: As above History: 
Past Medical History:  
Diagnosis Date  CAD (coronary artery disease)  DM2 (diabetes mellitus, type 2) ([address]  Gout  HLD (hyperlipidemia)  HTN (hypertension)  Peripheral neuropathy Past Surgical History:  
Procedure Laterality Date  CARDIAC SURG PROCEDURE UNLIST    
 stents x 3   
 HX CHOLECYSTECTOMY  HX CORONARY ARTERY BYPASS GRAFT    
 x3  
 HX TUBAL LIGATION Social History Socioeconomic History  Marital status: SINGLE Spouse name: Not on file  Number of children: Not on file  Years of education: Not on file  Highest education level: Not on file Tobacco Use  Smoking status: Never Smoker  Smokeless tobacco: Never Used Substance and Sexual Activity  Alcohol use: No  
 Drug use: No  
  
Family History Problem Relation Age of Onset  Hypertension Mother  Cancer Mother  Diabetes Mother  Heart Disease Mother Allergies: Allergies Allergen Reactions  Sulfa (Sulfonamide Antibiotics) Swelling Medications:  
Prior to Admission medications Medication Sig Start Date End Date Taking? Authorizing Provider  
metoprolol (LOPRESSOR) 25 mg tablet Take 1 Tab by mouth two (2) times a day. 6/15/15  Yes Buster Saldivar MD  
aspirin delayed-release 81 mg tablet Take  by mouth daily. Yes Provider, Historical  
rOPINIRole (REQUIP) 0.5 mg tablet Take 1 Tab by mouth nightly as needed. 4/21/14  Yes Buster Saldivar MD  
insulin glargine (LANTUS) 100 unit/mL injection 50 Units by SubCUTAneous route nightly.  2/6/19   Dalton Dewitt MD  
insulin lispro (HUMALOG) 100 unit/mL injection Per SSI 2/6/19   Dalton Dewitt MD  
 Blood-Glucose Meter (ONETOUCH ULTRAMINI) monitoring kit Use as directed 4/14/15   Estephanie Odonnell MD  
glucose blood VI test strips [facility] ULTRA TEST) strip Test 4 times daily as directed 4/14/15   Estephanie Odonnell MD  
Lancets [facility] ULTRASOFT LANCETS) misc Test 4 times daily as directed 4/14/15   Estephanie Odonnell MD  
losartan (COZAAR) 100 mg tablet Take  by mouth daily. 1/12/15   Provider, Historical  
furosemide (LASIX) 20 mg tablet Take  by mouth two (2) times a day. Provider, Historical  
Omeprazole delayed release (PRILOSEC D/R) 20 mg tablet Take 1 Tab by mouth daily. 3/9/15   Estephanie Odonnell MD  
rosuvastatin (CRESTOR) 20 mg tablet Take 1 Tab by mouth nightly. 3/9/15   Estephanie Odonnell MD  
gabapentin (NEURONTIN) 100 mg capsule Take 1 Cap by mouth three (3) times daily. 3/9/15   Estephanie Odonnell MD  
colchicine (COLCRYS) 0.6 mg tablet Take 0.6 mg by mouth three (3) times daily as needed. Provider, Historical  
 
 
Vital Signs: Blood pressure 168/76, pulse 64, temperature 97.9 °F (36.6 °C), resp. rate 18, height 5' 3\" (1.6 m), weight 79.8 kg (176 lb), SpO2 94 %. Physical Exam:  
General: alert Heart: regular rate and rhythm Lungs: no tachypnea, retractions or cyanosis, Heart exam - S1, S2 normal, no murmur, no gallop, rate regular Abdominal: Bowel sounds are normal, soft, non distended Plan of Care/Planned Procedure: EUS Signed: 
Oni Mujica MD 6/19/2019

## 2019-06-19 NOTE — PROCEDURES
ENDOSCOPIC ULTRASOUND PROCEDURE NOTE 
 
DATE OF PROCEDURE: 6/19/2019 PRE-OP DIAGNOSIS: 
Dilated common bile duct Dilated pancreatic duct Elevated LFTs POST-OP DIAGNOSIS: 
Dilated bile duct and pancreatic duct without obstructing lesion visualized Exam with mild limitations because of tenuous respiratory status MEDICATIONS ADMINISTERED: MAC INSTRUMENT: [address] T180 PROCEDURE:  After obtaining informed consent, the patient was placed in the left lateral position and sedated. The echoendoscope was advanced to the upper GI tract without difficulty. The scope was slowly removed while detailed images of the adjacent organs were obtained. The patient was taken to the recovery area in stable condition. FINDINGS: 
ESOPHAGUS: Not well visualized STOMACH: Limited views with the oblique-viewing echoendoscope were unremarkable DUODENUM: Limited views with the oblique-viewing echoendoscope were unremarkable. The ampulla was Normal Endoscopically PANCREAS: The pancreas was well visualized from the head to the tail. The ampulla appears normal endosonographically. No evidence of ampullary mass. The main pancreatic duct is mildly dilated, 3 mm in the body, 3 mm in the tail, 5 mm in the head. There is smooth tapering down to the level of the Papilla. There are no dilated side branches or cysts. The parenchyma appears normal. No changes of chronic pancreatitis. Evaluation mildly limited by tenuous respiratory status. BILIARY SYSTEM: Dilated common bile duct of 14 mm. Smooth tapering distally. No intraductal stones, Or abnormal wall thickening. OTHER ORGANS: There was no ascites in the upper abdomen. Limited views of the left lobe of the liver demonstrated no solid mass lesions. The celiac axis appears normal.  No significant celiac or juan-pancreatic lymphadenopathy.  
 
Estimated blood loss: 0-minimal  
 
PLAN: 
Consider either followup LFTs and imaging versus ERCP 
 Some causes of dilated bile duct are not visible on EUS, including ampullary stenosis or sphincter of Oddi dysfunction Further followup plans with Dr. Yani Landeros as outpatient Zachary Zeng MD 
[facility]

## 2019-06-19 NOTE — ANESTHESIA PREPROCEDURE EVALUATION
Anesthetic History No history of anesthetic complications Review of Systems / Medical History Pertinent labs reviewed Pulmonary Within defined limits Neuro/Psych Within defined limits Comments: Neuropathy Encephalopathy- A & O x 3. Cardiovascular Hypertension CAD, cardiac stents and CABG Exercise tolerance: <4 METS Comments: Essentially normal echo 2017 GI/Hepatic/Renal 
  
 
 
Renal disease: CRI Endo/Other Diabetes: poorly controlled, using insulin Other Findings Physical Exam 
 
Airway Mallampati: II 
TM Distance: 4 - 6 cm Neck ROM: normal range of motion Mouth opening: Normal 
 
 Cardiovascular Regular rate and rhythm,  S1 and S2 normal,  no murmur, click, rub, or gallop Dental 
No notable dental hx Pulmonary Breath sounds clear to auscultation Abdominal 
GI exam deferred Other Findings Anesthetic Plan ASA: 3 Anesthesia type: total IV anesthesia Induction: Intravenous Anesthetic plan and risks discussed with: Patient

## 2019-07-24 ENCOUNTER — APPOINTMENT (OUTPATIENT)
Dept: GENERAL RADIOLOGY | Age: 79
End: 2019-07-24
Attending: INTERNAL MEDICINE
Payer: MEDICARE

## 2019-07-24 ENCOUNTER — ANESTHESIA (OUTPATIENT)
Dept: ENDOSCOPY | Age: 79
End: 2019-07-24
Payer: MEDICARE

## 2019-07-24 ENCOUNTER — ANESTHESIA EVENT (OUTPATIENT)
Dept: ENDOSCOPY | Age: 79
End: 2019-07-24
Payer: MEDICARE

## 2019-07-24 ENCOUNTER — HOSPITAL ENCOUNTER (OUTPATIENT)
Age: 79
Setting detail: OUTPATIENT SURGERY
Discharge: HOME | End: 2019-07-24
Attending: INTERNAL MEDICINE | Admitting: INTERNAL MEDICINE
Payer: MEDICARE

## 2019-07-24 VITALS
OXYGEN SATURATION: 95 % | TEMPERATURE: 98 F | BODY MASS INDEX: 31.89 KG/M2 | WEIGHT: 180 LBS | HEART RATE: 75 BPM | SYSTOLIC BLOOD PRESSURE: 176 MMHG | DIASTOLIC BLOOD PRESSURE: 87 MMHG | RESPIRATION RATE: 18 BRPM | HEIGHT: 63 IN

## 2019-07-24 LAB
GLUCOSE BLD STRIP.AUTO-MCNC: 195 MG/DL (ref 65–100)
GLUCOSE BLD STRIP.AUTO-MCNC: 196 MG/DL (ref 65–100)

## 2019-07-24 PROCEDURE — 82962 GLUCOSE BLOOD TEST: CPT

## 2019-07-24 PROCEDURE — 74011250636 HC RX REV CODE- 250/636

## 2019-07-24 PROCEDURE — 76040000026: Performed by: INTERNAL MEDICINE

## 2019-07-24 PROCEDURE — 77030009038 HC CATH BILI STN RTVR BSC -C: Performed by: INTERNAL MEDICINE

## 2019-07-24 PROCEDURE — 76060000033 HC ANESTHESIA 1 TO 1.5 HR: Performed by: INTERNAL MEDICINE

## 2019-07-24 PROCEDURE — 77030039425 HC BLD LARYNG TRULITE DISP TELE -A: Performed by: ANESTHESIOLOGY

## 2019-07-24 PROCEDURE — 74011250637 HC RX REV CODE- 250/637: Performed by: INTERNAL MEDICINE

## 2019-07-24 PROCEDURE — 77030012595 HC SPHNTOM BILI BSC -D: Performed by: INTERNAL MEDICINE

## 2019-07-24 PROCEDURE — 74330 X-RAY BILE/PANC ENDOSCOPY: CPT

## 2019-07-24 PROCEDURE — 74011250636 HC RX REV CODE- 250/636: Performed by: ANESTHESIOLOGY

## 2019-07-24 PROCEDURE — 77030037088 HC TUBE ENDOTRACH ORAL NSL COVD-A: Performed by: ANESTHESIOLOGY

## 2019-07-24 PROCEDURE — 77030007288 HC DEV LOK BILI BSC -A: Performed by: INTERNAL MEDICINE

## 2019-07-24 PROCEDURE — 77030032490 HC SLV COMPR SCD KNE COVD -B: Performed by: INTERNAL MEDICINE

## 2019-07-24 PROCEDURE — 74011000250 HC RX REV CODE- 250

## 2019-07-24 RX ORDER — LIDOCAINE HYDROCHLORIDE 20 MG/ML
INJECTION, SOLUTION EPIDURAL; INFILTRATION; INTRACAUDAL; PERINEURAL AS NEEDED
Status: DISCONTINUED | OUTPATIENT
Start: 2019-07-24 | End: 2019-07-24 | Stop reason: HOSPADM

## 2019-07-24 RX ORDER — SODIUM CHLORIDE 0.9 % (FLUSH) 0.9 %
5-40 SYRINGE (ML) INJECTION EVERY 8 HOURS
Status: DISCONTINUED | OUTPATIENT
Start: 2019-07-24 | End: 2019-07-24 | Stop reason: HOSPADM

## 2019-07-24 RX ORDER — EPHEDRINE SULFATE 50 MG/ML
INJECTION, SOLUTION INTRAVENOUS AS NEEDED
Status: DISCONTINUED | OUTPATIENT
Start: 2019-07-24 | End: 2019-07-24 | Stop reason: HOSPADM

## 2019-07-24 RX ORDER — SODIUM CHLORIDE, SODIUM LACTATE, POTASSIUM CHLORIDE, CALCIUM CHLORIDE 600; 310; 30; 20 MG/100ML; MG/100ML; MG/100ML; MG/100ML
100 INJECTION, SOLUTION INTRAVENOUS CONTINUOUS
Status: DISCONTINUED | OUTPATIENT
Start: 2019-07-24 | End: 2019-07-24 | Stop reason: HOSPADM

## 2019-07-24 RX ORDER — ONDANSETRON 2 MG/ML
INJECTION INTRAMUSCULAR; INTRAVENOUS AS NEEDED
Status: DISCONTINUED | OUTPATIENT
Start: 2019-07-24 | End: 2019-07-24 | Stop reason: HOSPADM

## 2019-07-24 RX ORDER — ROCURONIUM BROMIDE 10 MG/ML
INJECTION, SOLUTION INTRAVENOUS AS NEEDED
Status: DISCONTINUED | OUTPATIENT
Start: 2019-07-24 | End: 2019-07-24 | Stop reason: HOSPADM

## 2019-07-24 RX ORDER — DEXAMETHASONE SODIUM PHOSPHATE 4 MG/ML
INJECTION, SOLUTION INTRA-ARTICULAR; INTRALESIONAL; INTRAMUSCULAR; INTRAVENOUS; SOFT TISSUE AS NEEDED
Status: DISCONTINUED | OUTPATIENT
Start: 2019-07-24 | End: 2019-07-24 | Stop reason: HOSPADM

## 2019-07-24 RX ORDER — SODIUM CHLORIDE 0.9 % (FLUSH) 0.9 %
5-40 SYRINGE (ML) INJECTION AS NEEDED
Status: DISCONTINUED | OUTPATIENT
Start: 2019-07-24 | End: 2019-07-24 | Stop reason: HOSPADM

## 2019-07-24 RX ORDER — FENTANYL CITRATE 50 UG/ML
INJECTION, SOLUTION INTRAMUSCULAR; INTRAVENOUS AS NEEDED
Status: DISCONTINUED | OUTPATIENT
Start: 2019-07-24 | End: 2019-07-24 | Stop reason: HOSPADM

## 2019-07-24 RX ORDER — PROPOFOL 10 MG/ML
INJECTION, EMULSION INTRAVENOUS AS NEEDED
Status: DISCONTINUED | OUTPATIENT
Start: 2019-07-24 | End: 2019-07-24 | Stop reason: HOSPADM

## 2019-07-24 RX ORDER — SUCCINYLCHOLINE CHLORIDE 20 MG/ML
INJECTION INTRAMUSCULAR; INTRAVENOUS AS NEEDED
Status: DISCONTINUED | OUTPATIENT
Start: 2019-07-24 | End: 2019-07-24 | Stop reason: HOSPADM

## 2019-07-24 RX ADMIN — EPHEDRINE SULFATE 10 MG: 50 INJECTION, SOLUTION INTRAVENOUS at 14:44

## 2019-07-24 RX ADMIN — FENTANYL CITRATE 25 MCG: 50 INJECTION, SOLUTION INTRAMUSCULAR; INTRAVENOUS at 14:32

## 2019-07-24 RX ADMIN — EPHEDRINE SULFATE 10 MG: 50 INJECTION, SOLUTION INTRAVENOUS at 14:22

## 2019-07-24 RX ADMIN — SUCCINYLCHOLINE CHLORIDE 120 MG: 20 INJECTION INTRAMUSCULAR; INTRAVENOUS at 14:00

## 2019-07-24 RX ADMIN — PROPOFOL 150 MG: 10 INJECTION, EMULSION INTRAVENOUS at 14:00

## 2019-07-24 RX ADMIN — EPHEDRINE SULFATE 10 MG: 50 INJECTION, SOLUTION INTRAVENOUS at 14:17

## 2019-07-24 RX ADMIN — SODIUM CHLORIDE, SODIUM LACTATE, POTASSIUM CHLORIDE, AND CALCIUM CHLORIDE 100 ML/HR: 600; 310; 30; 20 INJECTION, SOLUTION INTRAVENOUS at 12:24

## 2019-07-24 RX ADMIN — FENTANYL CITRATE 25 MCG: 50 INJECTION, SOLUTION INTRAMUSCULAR; INTRAVENOUS at 14:57

## 2019-07-24 RX ADMIN — LIDOCAINE HYDROCHLORIDE 60 MG: 20 INJECTION, SOLUTION EPIDURAL; INFILTRATION; INTRACAUDAL; PERINEURAL at 14:00

## 2019-07-24 RX ADMIN — ROCURONIUM BROMIDE 5 MG: 10 INJECTION, SOLUTION INTRAVENOUS at 14:00

## 2019-07-24 RX ADMIN — EPHEDRINE SULFATE 10 MG: 50 INJECTION, SOLUTION INTRAVENOUS at 14:15

## 2019-07-24 RX ADMIN — FENTANYL CITRATE 50 MCG: 50 INJECTION, SOLUTION INTRAMUSCULAR; INTRAVENOUS at 14:00

## 2019-07-24 RX ADMIN — DEXAMETHASONE SODIUM PHOSPHATE 10 MG: 4 INJECTION, SOLUTION INTRA-ARTICULAR; INTRALESIONAL; INTRAMUSCULAR; INTRAVENOUS; SOFT TISSUE at 14:15

## 2019-07-24 RX ADMIN — ONDANSETRON 4 MG: 2 INJECTION INTRAMUSCULAR; INTRAVENOUS at 14:52

## 2019-07-24 RX ADMIN — INDOMETHACIN 100 MG: 50 SUPPOSITORY RECTAL at 14:49

## 2019-07-24 NOTE — PROCEDURES
Gastroenterology Procedure Note           Procedure:  Endoscopic Retrograde Cholangiopancreatogram    Date of Procedure:  7/24/2019    Patient:  Luis Eller     1940    Indication:  Dilated CBD, Abnormal LFTs    Sedation:  General anesthesia    Pre-Procedure Physical Exam:    Mental status:  alert and oriented  Airway:  normal oropharyngeal airway and neck mobility  CV:  regular rate and rhythm  Respiratory:  clear to auscultation    Procedure:  A History and Physical has been performed, and patient medication allergies have been  reviewed. Risks of pancreatitis, perforation, hemorrhage, adverse drug reaction, and aspiration were discussed. After obtaining informed consent, the patient was sedated, intubated and placed in prone position on the fluoroscopy table. The [id number] duodenoscope was passed under indirect technique to the oropharynx and gently passed into the duodenum. Only partial views of the stomach and duodenum were obtained. After the procedure, the patient was taken to the recovery area in stable condition. Findings:     AMPULLA: Normal, small. BILE DUCT: This was cannulated using a Jagtome RX 44 sphincterotome over a 0.035 wire. This allowed prompt biliary cannulation with a wire-guided technique. Initial cholangiogram revealed significantly dilated bile duct ~13mm. Due to the location of the ampulla a long scope technique was required. As such it was difficult to get the sphincter tone in position to make a nice long cut. After sphincterotomy was performed on 12-15 balloon catheter was inserted. I was unable to pull the 12 mm balloon through the sphincterotomy as it was not large enough. The sphincter tone was reinserted and the sphincterotomy was extended as much as possible. A 9-12 mm balloon-tipped catheter was inserted and multiple sweeps were performed. A small stone was removed. Occlusion cholangiogram revealed no obvious filling defects.  There was excellent drainage of bowel from the ampulla as such no stent or dilation was deemed necessary. This completed the procedure. PANCREATIC DUCT: Pancreatogram was inadvertently injected. Limited opacification of the PD did not reveal any obvious abnormalities. Specimen:  No    Estimated Blood Loss:  Minimal    Implant:  None           Impression:  1. Dilated CBD  2. 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 Follow up in the office  2. Recheck labs at a later date  3.  Indomethacin 100mg SC x 1      Signed:  Praneeth Mccoy MD  7/24/2019  3:03 PM

## 2019-07-24 NOTE — DISCHARGE INSTRUCTIONS
Endoscopic Retrograde Cholangiopancreatogram (ERCP): What to Expect at [address] [facility]  After you have an endoscopic retrograde cholangiopancreatogram (ERCP). You will be able to go home after your doctor or a nurse checks to make sure you are not having any problems. If you stay in the hospital overnight, you may go home the next day. You may have a sore throat for a day or two after the procedure. This care sheet gives you a general idea about how long it will take for you to recover. But each person recovers at a different pace. Follow the steps below to get better as quickly as possible. How can you care for yourself at home? Activity  1. Rest as much as you need to after you go home. 2. You should be able to go back to your usual activities the day after the procedure. Diet  · Follow your doctor's directions for eating after the procedure. · Drink plenty of fluids (unless your doctor tells you not to). Medicines  · If you have a sore throat the next day, use an over-the-counter spray to numb your throat. Follow-up care is a key part of your treatment and safety. Be sure to make and go to all appointments, and call your doctor if you are having problems. Its also a good idea to know your test results and keep a list of the medicines you take. When should you call for help? Call 911 anytime you think you may need emergency care. For example, call if:  · You vomit blood or what looks like coffee grounds. · You pass maroon or bloody stools. Call your doctor now or go to the emergency room if:  · You have trouble swallowing. · You have belly pain. · Your stools are black and tarlike or have streaks of blood. · You are sick to your stomach and cannot drink fluids. · You have a fever. · You have pain that does not get better after you take your pain medicine. Watch closely for changes in your health, and be sure to contact your doctor if:  · Your throat still hurts after a day or two.   You do not get better as expected. After general anesthesia or intravenous sedation, for 24 hours or while taking prescription Narcotics:  · Limit your activities  · Do not drive and operate hazardous machinery  · Do not make important personal or business decisions  · Do  not drink alcoholic beverages  · If you have not urinated within 8 hours after discharge, please contact your surgeon on call. *  Please give a list of your current medications to your Primary Care Provider. *  Please update this list whenever your medications are discontinued, doses are      changed, or new medications (including over-the-counter products) are added. *  Please carry medication information at all times in case of emergency situations. These are general instructions for a healthy lifestyle:  No smoking/ No tobacco products/ Avoid exposure to second hand smoke  Surgeon General's Warning:  Quitting smoking now greatly reduces serious risk to your health.   Obesity, smoking, and sedentary lifestyle greatly increases your risk for illness  A healthy diet, regular physical exercise & weight monitoring are important for maintaining a healthy lifestyle

## 2019-07-24 NOTE — ANESTHESIA POSTPROCEDURE EVALUATION
Procedure(s):  ENDOSCOPIC RETROGRADE CHOLANGIOPANCREATOGRAPHY (ERCP) BMI 32  ENDOSCOPIC SPHINCTEROTOMY  ENDOSCOPIC STONE EXTRACTION/BALLOON SWEEP. general    Anesthesia Post Evaluation      Multimodal analgesia: multimodal analgesia used between 6 hours prior to anesthesia start to PACU discharge  Patient location during evaluation: bedside  Patient participation: complete - patient participated  Level of consciousness: awake and alert  Pain score: 3  Pain management: adequate  Airway patency: patent  Anesthetic complications: no  Cardiovascular status: acceptable and hemodynamically stable  Respiratory status: acceptable  Hydration status: acceptable  Post anesthesia nausea and vomiting:  none      Vitals Value Taken Time   /94 7/24/2019  3:40 PM   Temp 36.7 °C (98 °F) 7/24/2019  3:40 PM   Pulse 71 7/24/2019  3:41 PM   Resp 18 7/24/2019  3:40 PM   SpO2 96 % 7/24/2019  3:41 PM   Vitals shown include unvalidated device data.

## 2019-07-24 NOTE — H&P
History and Physical for Outpatient Procedure             Date: 7/24/2019     History of Present Illness:  Patient has history of dilated bile duct with abnormal LFTs and presents for ERCP. Past Medical History:   Diagnosis Date    CAD (coronary artery disease)     DM2 (diabetes mellitus, type 2) ([address]     Gout     HLD (hyperlipidemia)     HTN (hypertension)     Peripheral neuropathy      Past Surgical History:   Procedure Laterality Date    CARDIAC SURG PROCEDURE UNLIST      stents x 3 2009    HX CHOLECYSTECTOMY      jennifer in 1964    HX CORONARY ARTERY BYPASS GRAFT      x3    HX TUBAL LIGATION       In and  Family History   Problem Relation Age of Onset    Hypertension Mother     Cancer Mother     Diabetes Mother     Heart Disease Mother      Social History     Tobacco Use    Smoking status: Never Smoker    Smokeless tobacco: Never Used   Substance Use Topics    Alcohol use: Yes     Comment: socially        Allergies   Allergen Reactions    Sulfa (Sulfonamide Antibiotics) Swelling     Current Facility-Administered Medications   Medication Dose Route Frequency    lactated Ringers infusion  100 mL/hr IntraVENous CONTINUOUS    indomethacin (INDOCIN) rectal suppository 100 mg  100 mg Rectal PRN    iopamidol (ISOVUE-370) 76 % injection 200 mL  200 mL Other RAD ONCE        Review of Systems:  A detailed 10 organ review of systems is obtained with pertinent positives as listed in the History of Present Illness. All others are negative. Objective:     Physical Exam:  Visit Vitals  /67   Pulse 62   Temp 98.1 °F (36.7 °C)   Resp 17   Ht 5' 3\" (1.6 m)   Wt 81.6 kg (180 lb)   SpO2 100%   BMI 31.89 kg/m²      General:  Alert and oriented. Heart: Regular rate and rhythm  Lungs:  Clear to auscultation bilaterally  Abdomen: Soft, nontender, nondistended    Impression/Plan:     Proceed with ERCP with possible sphincterotomy, stent placement as planned.  I have discussed with the patient the technique, benefits, alternatives, and risks of these procedures, including medication reaction, immediate or delayed bleeding, or perforation of the gastrointestinal tract.      Signed By: Cheryl Foote MD     July 24, 2019

## 2019-08-08 ENCOUNTER — HOSPITAL ENCOUNTER (INPATIENT)
Age: 79
LOS: 8 days | Discharge: NURSING, REHAB OR LONG-TERM CARE | DRG: 871 | End: 2019-08-16
Attending: EMERGENCY MEDICINE | Admitting: INTERNAL MEDICINE
Payer: MEDICARE

## 2019-08-08 ENCOUNTER — APPOINTMENT (OUTPATIENT)
Dept: CT IMAGING | Age: 79
DRG: 871 | End: 2019-08-08
Attending: INTERNAL MEDICINE
Payer: MEDICARE

## 2019-08-08 ENCOUNTER — APPOINTMENT (OUTPATIENT)
Dept: GENERAL RADIOLOGY | Age: 79
DRG: 871 | End: 2019-08-08
Attending: EMERGENCY MEDICINE
Payer: MEDICARE

## 2019-08-08 ENCOUNTER — APPOINTMENT (OUTPATIENT)
Dept: GENERAL RADIOLOGY | Age: 79
DRG: 871 | End: 2019-08-08
Attending: INTERNAL MEDICINE
Payer: MEDICARE

## 2019-08-08 DIAGNOSIS — N17.9 ACUTE RENAL FAILURE, UNSPECIFIED ACUTE RENAL FAILURE TYPE (HCC): ICD-10-CM

## 2019-08-08 DIAGNOSIS — K85.90 ACUTE PANCREATITIS, UNSPECIFIED COMPLICATION STATUS, UNSPECIFIED PANCREATITIS TYPE: ICD-10-CM

## 2019-08-08 DIAGNOSIS — R73.9 HYPERGLYCEMIA: ICD-10-CM

## 2019-08-08 DIAGNOSIS — N17.9 ACUTE KIDNEY INJURY (HCC): ICD-10-CM

## 2019-08-08 DIAGNOSIS — K75.9 HEPATITIS: ICD-10-CM

## 2019-08-08 DIAGNOSIS — A41.9 SEPTIC SHOCK (HCC): Primary | ICD-10-CM

## 2019-08-08 DIAGNOSIS — R78.81 GRAM-NEGATIVE BACTEREMIA: ICD-10-CM

## 2019-08-08 DIAGNOSIS — R79.89 ELEVATED LIVER FUNCTION TESTS: ICD-10-CM

## 2019-08-08 DIAGNOSIS — R65.21 SEPTIC SHOCK (HCC): Primary | ICD-10-CM

## 2019-08-08 DIAGNOSIS — E11.65 UNCONTROLLED TYPE 2 DIABETES MELLITUS WITH HYPERGLYCEMIA (HCC): Chronic | ICD-10-CM

## 2019-08-08 DIAGNOSIS — D64.9 ANEMIA, UNSPECIFIED TYPE: ICD-10-CM

## 2019-08-08 PROBLEM — G93.41 ACUTE METABOLIC ENCEPHALOPATHY: Status: RESOLVED | Noted: 2019-01-30 | Resolved: 2019-08-08

## 2019-08-08 PROBLEM — R10.31 RIGHT LOWER QUADRANT ABDOMINAL PAIN: Status: ACTIVE | Noted: 2019-08-08

## 2019-08-08 PROBLEM — R56.9 SEIZURE-LIKE ACTIVITY (HCC): Status: RESOLVED | Noted: 2019-02-01 | Resolved: 2019-08-08

## 2019-08-08 LAB
ADMINISTERED INITIALS, ADMINIT: NORMAL
ALBUMIN SERPL-MCNC: 2.8 G/DL (ref 3.2–4.6)
ALBUMIN/GLOB SERPL: 1 {RATIO} (ref 1.2–3.5)
ALP SERPL-CCNC: 180 U/L (ref 50–136)
ALT SERPL-CCNC: 2586 U/L (ref 12–65)
ANION GAP SERPL CALC-SCNC: 10 MMOL/L (ref 7–16)
ANION GAP SERPL CALC-SCNC: 12 MMOL/L (ref 7–16)
ANION GAP SERPL CALC-SCNC: 15 MMOL/L (ref 7–16)
APPEARANCE UR: ABNORMAL
AST SERPL-CCNC: 4996 U/L (ref 15–37)
ATRIAL RATE: 72 BPM
BACTERIA URNS QL MICRO: ABNORMAL /HPF
BASOPHILS # BLD: 0 K/UL (ref 0–0.2)
BASOPHILS NFR BLD: 0 % (ref 0–2)
BILIRUB SERPL-MCNC: 0.4 MG/DL (ref 0.2–1.1)
BILIRUB UR QL: NEGATIVE
BUN SERPL-MCNC: 125 MG/DL (ref 8–23)
BUN SERPL-MCNC: 130 MG/DL (ref 8–23)
BUN SERPL-MCNC: 139 MG/DL (ref 8–23)
CALCIUM SERPL-MCNC: 7.9 MG/DL (ref 8.3–10.4)
CALCIUM SERPL-MCNC: 8 MG/DL (ref 8.3–10.4)
CALCIUM SERPL-MCNC: 8.5 MG/DL (ref 8.3–10.4)
CALCULATED P AXIS, ECG09: 47 DEGREES
CALCULATED R AXIS, ECG10: 53 DEGREES
CALCULATED T AXIS, ECG11: 75 DEGREES
CASTS URNS QL MICRO: 0 /LPF
CHLORIDE SERPL-SCNC: 104 MMOL/L (ref 98–107)
CHLORIDE SERPL-SCNC: 108 MMOL/L (ref 98–107)
CHLORIDE SERPL-SCNC: 97 MMOL/L (ref 98–107)
CO2 SERPL-SCNC: 20 MMOL/L (ref 21–32)
CO2 SERPL-SCNC: 20 MMOL/L (ref 21–32)
CO2 SERPL-SCNC: 23 MMOL/L (ref 21–32)
COLOR UR: YELLOW
CREAT SERPL-MCNC: 2.63 MG/DL (ref 0.6–1)
CREAT SERPL-MCNC: 2.82 MG/DL (ref 0.6–1)
CREAT SERPL-MCNC: 3.35 MG/DL (ref 0.6–1)
CRYSTALS URNS QL MICRO: 0 /LPF
D50 ADMINISTERED, D50ADM: 0 ML
D50 ORDER, D50ORD: 0 ML
DIAGNOSIS, 93000: NORMAL
DIFFERENTIAL METHOD BLD: ABNORMAL
EOSINOPHIL # BLD: 0 K/UL (ref 0–0.8)
EOSINOPHIL NFR BLD: 0 % (ref 0.5–7.8)
EPI CELLS #/AREA URNS HPF: 0 /HPF
ERYTHROCYTE [DISTWIDTH] IN BLOOD BY AUTOMATED COUNT: 13.4 % (ref 11.9–14.6)
EST. AVERAGE GLUCOSE BLD GHB EST-MCNC: 232 MG/DL
GLOBULIN SER CALC-MCNC: 2.7 G/DL (ref 2.3–3.5)
GLSCOM COMMENTS: NORMAL
GLUCOSE BLD STRIP.AUTO-MCNC: 359 MG/DL (ref 65–100)
GLUCOSE BLD STRIP.AUTO-MCNC: 397 MG/DL (ref 65–100)
GLUCOSE BLD STRIP.AUTO-MCNC: 482 MG/DL (ref 65–100)
GLUCOSE BLD STRIP.AUTO-MCNC: 492 MG/DL (ref 65–100)
GLUCOSE BLD STRIP.AUTO-MCNC: 505 MG/DL (ref 65–100)
GLUCOSE BLD STRIP.AUTO-MCNC: 520 MG/DL (ref 65–100)
GLUCOSE BLD STRIP.AUTO-MCNC: 532 MG/DL (ref 65–100)
GLUCOSE SERPL-MCNC: 384 MG/DL (ref 65–100)
GLUCOSE SERPL-MCNC: 518 MG/DL (ref 65–100)
GLUCOSE SERPL-MCNC: 550 MG/DL (ref 65–100)
GLUCOSE UR STRIP.AUTO-MCNC: 250 MG/DL
GLUCOSE, GLC: 359 MG/DL
GLUCOSE, GLC: 397 MG/DL
GLUCOSE, GLC: 482 MG/DL
GLUCOSE, GLC: 492 MG/DL
GLUCOSE, GLC: 520 MG/DL
GLUCOSE, GLC: 532 MG/DL
HBA1C MFR BLD: 9.7 % (ref 4.8–6)
HCT VFR BLD AUTO: 21.4 % (ref 35.8–46.3)
HCT VFR BLD AUTO: 22.2 % (ref 35.8–46.3)
HGB BLD-MCNC: 6.8 G/DL (ref 11.7–15.4)
HGB BLD-MCNC: 7.2 G/DL (ref 11.7–15.4)
HGB UR QL STRIP: NEGATIVE
HIGH TARGET, HITG: 180 MG/DL
IMM GRANULOCYTES # BLD AUTO: 0.7 K/UL (ref 0–0.5)
IMM GRANULOCYTES NFR BLD AUTO: 2 % (ref 0–5)
INR PPP: 1.2
INSULIN ADMINSTERED, INSADM: 13.8 UNITS/HOUR
INSULIN ADMINSTERED, INSADM: 16.9 UNITS/HOUR
INSULIN ADMINSTERED, INSADM: 17.3 UNITS/HOUR
INSULIN ADMINSTERED, INSADM: 17.9 UNITS/HOUR
INSULIN ADMINSTERED, INSADM: 21.1 UNITS/HOUR
INSULIN ADMINSTERED, INSADM: 9.4 UNITS/HOUR
INSULIN ORDER, INSORD: 13.8 UNITS/HOUR
INSULIN ORDER, INSORD: 16.9 UNITS/HOUR
INSULIN ORDER, INSORD: 17.3 UNITS/HOUR
INSULIN ORDER, INSORD: 17.9 UNITS/HOUR
INSULIN ORDER, INSORD: 21.1 UNITS/HOUR
INSULIN ORDER, INSORD: 9.4 UNITS/HOUR
KETONES UR QL STRIP.AUTO: NEGATIVE MG/DL
LACTATE BLD-SCNC: 4.51 MMOL/L (ref 0.5–1.9)
LACTATE BLD-SCNC: 6.14 MMOL/L (ref 0.5–1.9)
LACTATE SERPL-SCNC: 3.2 MMOL/L (ref 0.4–2)
LEUKOCYTE ESTERASE UR QL STRIP.AUTO: ABNORMAL
LIPASE SERPL-CCNC: 2516 U/L (ref 73–393)
LOW TARGET, LOT: 140 MG/DL
LYMPHOCYTES # BLD: 1 K/UL (ref 0.5–4.6)
LYMPHOCYTES NFR BLD: 3 % (ref 13–44)
MAGNESIUM SERPL-MCNC: 2.5 MG/DL (ref 1.8–2.4)
MAGNESIUM SERPL-MCNC: 2.5 MG/DL (ref 1.8–2.4)
MCH RBC QN AUTO: 30.8 PG (ref 26.1–32.9)
MCHC RBC AUTO-ENTMCNC: 31.8 G/DL (ref 31.4–35)
MCV RBC AUTO: 96.8 FL (ref 79.6–97.8)
MINUTES UNTIL NEXT BG, NBG: 60 MIN
MONOCYTES # BLD: 2.3 K/UL (ref 0.1–1.3)
MONOCYTES NFR BLD: 7 % (ref 4–12)
MUCOUS THREADS URNS QL MICRO: 0 /LPF
MULTIPLIER, MUL: 0.02
MULTIPLIER, MUL: 0.03
MULTIPLIER, MUL: 0.04
MULTIPLIER, MUL: 0.05
MULTIPLIER, MUL: 0.05
MULTIPLIER, MUL: 0.06
NEUTS SEG # BLD: 28.7 K/UL (ref 1.7–8.2)
NEUTS SEG NFR BLD: 88 % (ref 43–78)
NITRITE UR QL STRIP.AUTO: NEGATIVE
NRBC # BLD: 0 K/UL (ref 0–0.2)
ORDER INITIALS, ORDINIT: NORMAL
P-R INTERVAL, ECG05: 206 MS
PH UR STRIP: 5 [PH] (ref 5–9)
PHOSPHATE SERPL-MCNC: 4.7 MG/DL (ref 2.3–3.7)
PLATELET # BLD AUTO: 194 K/UL (ref 150–450)
PMV BLD AUTO: 12.3 FL (ref 9.4–12.3)
POTASSIUM SERPL-SCNC: 4.9 MMOL/L (ref 3.5–5.1)
POTASSIUM SERPL-SCNC: 5 MMOL/L (ref 3.5–5.1)
POTASSIUM SERPL-SCNC: 5.5 MMOL/L (ref 3.5–5.1)
PROCALCITONIN SERPL-MCNC: 9.5 NG/ML
PROT SERPL-MCNC: 5.5 G/DL (ref 6.3–8.2)
PROT UR STRIP-MCNC: NEGATIVE MG/DL
PROTHROMBIN TIME: 14.9 SEC (ref 11.7–14.5)
Q-T INTERVAL, ECG07: 370 MS
QRS DURATION, ECG06: 90 MS
QTC CALCULATION (BEZET), ECG08: 405 MS
RBC # BLD AUTO: 2.21 M/UL (ref 4.05–5.2)
RBC #/AREA URNS HPF: 0 /HPF
SODIUM SERPL-SCNC: 132 MMOL/L (ref 136–145)
SODIUM SERPL-SCNC: 136 MMOL/L (ref 136–145)
SODIUM SERPL-SCNC: 141 MMOL/L (ref 136–145)
UROBILINOGEN UR QL STRIP.AUTO: 0.2 EU/DL (ref 0.2–1)
VENTRICULAR RATE, ECG03: 72 BPM
WBC # BLD AUTO: 32.7 K/UL (ref 4.3–11.1)
WBC URNS QL MICRO: ABNORMAL /HPF

## 2019-08-08 PROCEDURE — 81015 MICROSCOPIC EXAM OF URINE: CPT

## 2019-08-08 PROCEDURE — 74011250636 HC RX REV CODE- 250/636: Performed by: INTERNAL MEDICINE

## 2019-08-08 PROCEDURE — 74011000258 HC RX REV CODE- 258: Performed by: INTERNAL MEDICINE

## 2019-08-08 PROCEDURE — 87040 BLOOD CULTURE FOR BACTERIA: CPT

## 2019-08-08 PROCEDURE — 71045 X-RAY EXAM CHEST 1 VIEW: CPT

## 2019-08-08 PROCEDURE — 87205 SMEAR GRAM STAIN: CPT

## 2019-08-08 PROCEDURE — 81003 URINALYSIS AUTO W/O SCOPE: CPT

## 2019-08-08 PROCEDURE — P9016 RBC LEUKOCYTES REDUCED: HCPCS

## 2019-08-08 PROCEDURE — 96365 THER/PROPH/DIAG IV INF INIT: CPT | Performed by: EMERGENCY MEDICINE

## 2019-08-08 PROCEDURE — 85610 PROTHROMBIN TIME: CPT

## 2019-08-08 PROCEDURE — 83690 ASSAY OF LIPASE: CPT

## 2019-08-08 PROCEDURE — 85025 COMPLETE CBC W/AUTO DIFF WBC: CPT

## 2019-08-08 PROCEDURE — 74011250636 HC RX REV CODE- 250/636: Performed by: EMERGENCY MEDICINE

## 2019-08-08 PROCEDURE — 87186 SC STD MICRODIL/AGAR DIL: CPT

## 2019-08-08 PROCEDURE — 77030019605

## 2019-08-08 PROCEDURE — 36556 INSERT NON-TUNNEL CV CATH: CPT | Performed by: INTERNAL MEDICINE

## 2019-08-08 PROCEDURE — 86900 BLOOD TYPING SEROLOGIC ABO: CPT

## 2019-08-08 PROCEDURE — 87150 DNA/RNA AMPLIFIED PROBE: CPT

## 2019-08-08 PROCEDURE — C9113 INJ PANTOPRAZOLE SODIUM, VIA: HCPCS | Performed by: INTERNAL MEDICINE

## 2019-08-08 PROCEDURE — 86923 COMPATIBILITY TEST ELECTRIC: CPT

## 2019-08-08 PROCEDURE — 36430 TRANSFUSION BLD/BLD COMPNT: CPT

## 2019-08-08 PROCEDURE — 80048 BASIC METABOLIC PNL TOTAL CA: CPT

## 2019-08-08 PROCEDURE — 99223 1ST HOSP IP/OBS HIGH 75: CPT | Performed by: INTERNAL MEDICINE

## 2019-08-08 PROCEDURE — 77030021907 HC KT URIN FOL O&M -A

## 2019-08-08 PROCEDURE — 84145 PROCALCITONIN (PCT): CPT

## 2019-08-08 PROCEDURE — 83605 ASSAY OF LACTIC ACID: CPT

## 2019-08-08 PROCEDURE — 74011636637 HC RX REV CODE- 636/637: Performed by: INTERNAL MEDICINE

## 2019-08-08 PROCEDURE — 65610000001 HC ROOM ICU GENERAL

## 2019-08-08 PROCEDURE — 87077 CULTURE AEROBIC IDENTIFY: CPT

## 2019-08-08 PROCEDURE — 82962 GLUCOSE BLOOD TEST: CPT

## 2019-08-08 PROCEDURE — 93005 ELECTROCARDIOGRAM TRACING: CPT | Performed by: EMERGENCY MEDICINE

## 2019-08-08 PROCEDURE — 96361 HYDRATE IV INFUSION ADD-ON: CPT | Performed by: EMERGENCY MEDICINE

## 2019-08-08 PROCEDURE — C1751 CATH, INF, PER/CENT/MIDLINE: HCPCS

## 2019-08-08 PROCEDURE — 80053 COMPREHEN METABOLIC PANEL: CPT

## 2019-08-08 PROCEDURE — 05HN33Z INSERTION OF INFUSION DEVICE INTO LEFT INTERNAL JUGULAR VEIN, PERCUTANEOUS APPROACH: ICD-10-PCS | Performed by: INTERNAL MEDICINE

## 2019-08-08 PROCEDURE — 87086 URINE CULTURE/COLONY COUNT: CPT

## 2019-08-08 PROCEDURE — 74176 CT ABD & PELVIS W/O CONTRAST: CPT

## 2019-08-08 PROCEDURE — 36556 INSERT NON-TUNNEL CV CATH: CPT

## 2019-08-08 PROCEDURE — 85018 HEMOGLOBIN: CPT

## 2019-08-08 PROCEDURE — 77030032490 HC SLV COMPR SCD KNE COVD -B

## 2019-08-08 PROCEDURE — 87088 URINE BACTERIA CULTURE: CPT

## 2019-08-08 PROCEDURE — 80074 ACUTE HEPATITIS PANEL: CPT

## 2019-08-08 PROCEDURE — 74011000258 HC RX REV CODE- 258: Performed by: EMERGENCY MEDICINE

## 2019-08-08 PROCEDURE — 36600 WITHDRAWAL OF ARTERIAL BLOOD: CPT

## 2019-08-08 PROCEDURE — 84100 ASSAY OF PHOSPHORUS: CPT

## 2019-08-08 PROCEDURE — 83735 ASSAY OF MAGNESIUM: CPT

## 2019-08-08 PROCEDURE — 74011000250 HC RX REV CODE- 250: Performed by: EMERGENCY MEDICINE

## 2019-08-08 PROCEDURE — 83036 HEMOGLOBIN GLYCOSYLATED A1C: CPT

## 2019-08-08 PROCEDURE — 74011000250 HC RX REV CODE- 250: Performed by: INTERNAL MEDICINE

## 2019-08-08 PROCEDURE — 99285 EMERGENCY DEPT VISIT HI MDM: CPT | Performed by: EMERGENCY MEDICINE

## 2019-08-08 RX ORDER — SODIUM CHLORIDE 0.9 % (FLUSH) 0.9 %
5-40 SYRINGE (ML) INJECTION AS NEEDED
Status: DISCONTINUED | OUTPATIENT
Start: 2019-08-08 | End: 2019-08-16 | Stop reason: HOSPADM

## 2019-08-08 RX ORDER — SODIUM CHLORIDE 9 MG/ML
125 INJECTION, SOLUTION INTRAVENOUS CONTINUOUS
Status: DISCONTINUED | OUTPATIENT
Start: 2019-08-08 | End: 2019-08-09

## 2019-08-08 RX ORDER — METRONIDAZOLE 500 MG/100ML
500 INJECTION, SOLUTION INTRAVENOUS ONCE
Status: COMPLETED | OUTPATIENT
Start: 2019-08-08 | End: 2019-08-09

## 2019-08-08 RX ORDER — NOREPINEPHRINE BIT/0.9 % NACL 4MG/250ML
2-16 PLASTIC BAG, INJECTION (ML) INTRAVENOUS
Status: DISCONTINUED | OUTPATIENT
Start: 2019-08-08 | End: 2019-08-10

## 2019-08-08 RX ORDER — SODIUM CHLORIDE 0.9 % (FLUSH) 0.9 %
5-10 SYRINGE (ML) INJECTION AS NEEDED
Status: DISCONTINUED | OUTPATIENT
Start: 2019-08-08 | End: 2019-08-14 | Stop reason: SDUPTHER

## 2019-08-08 RX ORDER — ONDANSETRON 2 MG/ML
4 INJECTION INTRAMUSCULAR; INTRAVENOUS
Status: DISCONTINUED | OUTPATIENT
Start: 2019-08-08 | End: 2019-08-16 | Stop reason: HOSPADM

## 2019-08-08 RX ORDER — DEXTROSE 50 % IN WATER (D50W) INTRAVENOUS SYRINGE
25-50 AS NEEDED
Status: DISCONTINUED | OUTPATIENT
Start: 2019-08-08 | End: 2019-08-16 | Stop reason: HOSPADM

## 2019-08-08 RX ORDER — METRONIDAZOLE 500 MG/100ML
500 INJECTION, SOLUTION INTRAVENOUS EVERY 12 HOURS
Status: DISCONTINUED | OUTPATIENT
Start: 2019-08-09 | End: 2019-08-11

## 2019-08-08 RX ORDER — SODIUM CHLORIDE 0.9 % (FLUSH) 0.9 %
5-40 SYRINGE (ML) INJECTION EVERY 8 HOURS
Status: DISCONTINUED | OUTPATIENT
Start: 2019-08-08 | End: 2019-08-16 | Stop reason: HOSPADM

## 2019-08-08 RX ORDER — SODIUM CHLORIDE 9 MG/ML
250 INJECTION, SOLUTION INTRAVENOUS AS NEEDED
Status: DISCONTINUED | OUTPATIENT
Start: 2019-08-08 | End: 2019-08-14 | Stop reason: SDUPTHER

## 2019-08-08 RX ORDER — INSULIN GLARGINE 100 [IU]/ML
12 INJECTION, SOLUTION SUBCUTANEOUS DAILY
Status: DISCONTINUED | OUTPATIENT
Start: 2019-08-09 | End: 2019-08-08

## 2019-08-08 RX ORDER — DEXTROSE 40 %
15 GEL (GRAM) ORAL AS NEEDED
Status: DISCONTINUED | OUTPATIENT
Start: 2019-08-08 | End: 2019-08-16 | Stop reason: HOSPADM

## 2019-08-08 RX ADMIN — SODIUM CHLORIDE 40 MG: 9 INJECTION, SOLUTION INTRAMUSCULAR; INTRAVENOUS; SUBCUTANEOUS at 17:31

## 2019-08-08 RX ADMIN — SODIUM CHLORIDE 1000 ML: 900 INJECTION, SOLUTION INTRAVENOUS at 12:45

## 2019-08-08 RX ADMIN — SODIUM CHLORIDE 9.4 UNITS/HR: 900 INJECTION, SOLUTION INTRAVENOUS at 17:40

## 2019-08-08 RX ADMIN — Medication 10 ML: at 23:00

## 2019-08-08 RX ADMIN — SODIUM CHLORIDE 1000 ML: 900 INJECTION, SOLUTION INTRAVENOUS at 12:19

## 2019-08-08 RX ADMIN — SODIUM CHLORIDE 100 ML/HR: 900 INJECTION, SOLUTION INTRAVENOUS at 15:22

## 2019-08-08 RX ADMIN — SODIUM CHLORIDE 17.9 UNITS/HR: 900 INJECTION, SOLUTION INTRAVENOUS at 22:59

## 2019-08-08 RX ADMIN — CEFTRIAXONE SODIUM 2 G: 2 INJECTION, POWDER, FOR SOLUTION INTRAMUSCULAR; INTRAVENOUS at 13:15

## 2019-08-08 RX ADMIN — NOREPINEPHRINE BITARTRATE 4 MCG/MIN: 1 INJECTION INTRAVENOUS at 13:23

## 2019-08-08 RX ADMIN — METRONIDAZOLE 500 MG: 500 INJECTION, SOLUTION INTRAVENOUS at 17:31

## 2019-08-08 RX ADMIN — Medication 10 ML: at 15:34

## 2019-08-08 NOTE — ED TRIAGE NOTES
PT REPORTS ABDOMINAL PAIN THAT STARTED YESTERDAY . PT PAIN 10/10. PT REPORTS DIARRHEA . PT DENIES N/V . PT LESS ACTIVE TODAY . PT BGL OVER 600 . PT BP WAS 72/40 . PT GIVEN 400 NS .

## 2019-08-08 NOTE — INTERVAL H&P NOTE
H&P Update: 
Demetrice Milner was seen and examined. History and physical has been reviewed. The patient has been examined.  There have been no significant clinical changes since the completion of the originally dated History and Physical.

## 2019-08-08 NOTE — CONSULTS
[facility] Consult Note Primary GI Physician: Dr. Mir Larkin Referring Provider:  Dr. Kody Contreras Consult Date:  8/8/2019 Admit Date:  8/8/2019 Chief Complaint:  Septic shock; abnormal LFTS Subjective:  
 
History of Present Illness:  Patient is a 78 y.o. female with PMH of (but not limited to) CAD with history of CABG on Plavix, DM, HTN, HLD, hx of ischemic colitis, diverticular disease, an ampullary stenosis who is seen in consultation at the request of Dr. Kody Contreras for septic shock with abnormal LFTS and recent ERCP. Patient developed intermittent N&V following recent ERCP on 7/24/19. She had associated poor appetite. She did not seek medical care, until she developed increasing symptoms yesterday to include diarrhea and RLQ abdominal pain. She was brought to the ED today by her caregiver and on evaluation was found to have leukocytosis with WBC 32.7, Hgb of 6.8 (previously 9.9 on 2/4), glucose 550, (20), Creat 3.35 (1.44), T bili 0.4, ALT 2586, AST 4996, , lipase 2516, procalcitonin 9.5. She is hypotensive despite fluid resuscitation. She is being admitted by the hospital intensivist to the ICU, started on antibiotics, undergoing septic workup and CT scan of the abdomen/pelvis is pending. Procedure:  Endoscopic Retrograde Cholangiopancreatogram 
  
Date of Procedure:  7/24/2019 
  
Patient:  Sara Contreras     1940 
  
Indication:  Dilated CBD, Abnormal LFTs 
  
Sedation:  General anesthesia 
  
Pre-Procedure Physical Exam: 
  
Mental status:  alert and oriented Airway:  normal oropharyngeal airway and neck mobility CV:  regular rate and rhythm Respiratory:  clear to auscultation 
  
Procedure: A History and Physical has been performed, and patient medication allergies have been 
reviewed. Risks of pancreatitis, perforation, hemorrhage, adverse drug reaction, and aspiration were discussed.  After obtaining informed consent, the patient was sedated, intubated and placed in prone position on the fluoroscopy table.  The [id number] duodenoscope was passed under indirect technique to the oropharynx and gently passed into the duodenum.  Only partial views of the stomach and duodenum were obtained.  After the procedure, the patient was taken to the recovery area in stable condition. 
  
Findings:  
  
AMPULLA: Normal, small. 
  
BILE DUCT: This was cannulated using a Jagtome RX 44 sphincterotome over a 0.035 wire. This allowed prompt biliary cannulation with a wire-guided technique. Initial cholangiogram revealed significantly dilated bile duct ~13mm. Due to the location of the ampulla a long scope technique was required. As such it was difficult to get the sphincter tone in position to make a nice long cut. After sphincterotomy was performed on 12-15 balloon catheter was inserted. I was unable to pull the 12 mm balloon through the sphincterotomy as it was not large enough. The sphincter tone was reinserted and the sphincterotomy was extended as much as possible. A 9-12 mm balloon-tipped catheter was inserted and multiple sweeps were performed. A small stone was removed. Occlusion cholangiogram revealed no obvious filling defects. There was excellent drainage of bowel from the ampulla as such no stent or dilation was deemed necessary. This completed the procedure.  
  
PANCREATIC DUCT: Pancreatogram was inadvertently injected. Limited opacification of the PD did not reveal any obvious abnormalities.  
  
  
Specimen:  No 
  
Estimated Blood Loss:  Minimal 
  
Implant:  None Impression: 1. Dilated CBD 2. Small stone (doubt this is the cause of the patient's abnormalities both radiologic and lab wise) Suspect ampullary stenosis, less likely SOD 
  
Plan: 1. Follow up in the office 2. Recheck labs at a later date 3.  Indomethacin 100mg SC x 1 
  
  
Signed: 
Nita Molina MD 
7/24/2019 
3:03 PM 
 
 ENDOSCOPIC ULTRASOUND PROCEDURE NOTE 
 
DATE OF PROCEDURE: 6/19/2019 
  
PRE-OP DIAGNOSIS: 
Dilated common bile duct Dilated pancreatic duct Elevated LFTs  
  
POST-OP DIAGNOSIS: 
Dilated bile duct and pancreatic duct without obstructing lesion visualized Exam with mild limitations because of tenuous respiratory status  
  MEDICATIONS ADMINISTERED: MAC 
  
INSTRUMENT: GFUC T180 
  
PROCEDURE:  After obtaining informed consent, the patient was placed in the left lateral position and sedated. The echoendoscope was advanced to the upper GI tract without difficulty. The scope was slowly removed while detailed images of the adjacent organs were obtained. The patient was taken to the recovery area in stable condition. 
  
FINDINGS: 
ESOPHAGUS: Not well visualized STOMACH: Limited views with the oblique-viewing echoendoscope were unremarkable DUODENUM: Limited views with the oblique-viewing echoendoscope were unremarkable. The ampulla was Normal Endoscopically PANCREAS: The pancreas was well visualized from the head to the tail. The ampulla appears normal endosonographically. No evidence of ampullary mass. The main pancreatic duct is mildly dilated, 3 mm in the body, 3 mm in the tail, 5 mm in the head. There is smooth tapering down to the level of the Papilla. There are no dilated side branches or cysts. The parenchyma appears normal. No changes of chronic pancreatitis. Evaluation mildly limited by tenuous respiratory status. BILIARY SYSTEM: Dilated common bile duct of 14 mm. Smooth tapering distally. No intraductal stones, Or abnormal wall thickening. OTHER ORGANS: There was no ascites in the upper abdomen. Limited views of the left lobe of the liver demonstrated no solid mass lesions. The celiac axis appears normal.  No significant celiac or juan-pancreatic lymphadenopathy. 
  
Estimated blood loss: 0-minimal  
  PLAN: 
Consider either followup LFTs and imaging versus ERCP 
 Some causes of dilated bile duct are not visible on EUS, including ampullary stenosis or sphincter of Oddi dysfunction Further followup plans with Dr. Chelsey Duran as outpatient  
Gabrielle Frausto MD 
[facility] 
  
   
  
Colonoscopy 1/31/11 by Dr. Parish Whitaker revealed hyperplastic polyps of the sigmoid colon, diverticulosis scattered through the entire colon, resolving colitis likely ischemic PMH: 
Past Medical History:  
Diagnosis Date  Acute cystitis without hematuria 1/30/2019  CAD (coronary artery disease)  DM2 (diabetes mellitus, type 2) ([facility]  Gout  HLD (hyperlipidemia)  HTN (hypertension)  Peripheral neuropathy PSH: 
Past Surgical History:  
Procedure Laterality Date  CARDIAC SURG PROCEDURE UNLIST    
 stents x 3 2009  HX CHOLECYSTECTOMY jennifer in 1964  HX CORONARY ARTERY BYPASS GRAFT    
 x3  
 HX TUBAL LIGATION Allergies: Allergies Allergen Reactions  Sulfa (Sulfonamide Antibiotics) Swelling Home Medications: 
Prior to Admission medications Medication Sig Start Date End Date Taking? Authorizing Provider  
clopidogrel (PLAVIX) 75 mg tab Take 75 mg by mouth. Provider, Historical  
insulin glargine (LANTUS) 100 unit/mL injection 50 Units by SubCUTAneous route nightly. 2/6/19   Jennie Dewitt MD  
insulin lispro (HUMALOG) 100 unit/mL injection Per SSI 2/6/19   Jennie Dewitt MD  
metoprolol (LOPRESSOR) 25 mg tablet Take 1 Tab by mouth two (2) times a day. 6/15/15   Mariah Sellers MD  
Blood-Glucose Meter [facility] ULTRAMINI) monitoring kit Use as directed 4/14/15   Mariah Sellers MD  
glucose blood VI test strips [facility] ULTRA TEST) strip Test 4 times daily as directed 4/14/15   Mariah Sellers MD  
Lancets [facility] ULTRASOFT LANCETS) misc Test 4 times daily as directed 4/14/15   Mariah Sellers MD  
losartan (COZAAR) 100 mg tablet Take  by mouth daily.  1/12/15   Provider, Historical  
 furosemide (LASIX) 20 mg tablet Take  by mouth two (2) times a day. Provider, Historical  
Omeprazole delayed release (PRILOSEC D/R) 20 mg tablet Take 1 Tab by mouth daily. 3/9/15   Grady Watkins MD  
rosuvastatin (CRESTOR) 20 mg tablet Take 1 Tab by mouth nightly. 3/9/15   Grady Watkins MD  
gabapentin (NEURONTIN) 100 mg capsule Take 1 Cap by mouth three (3) times daily. 3/9/15   Grady Watkins MD  
colchicine (COLCRYS) 0.6 mg tablet Take 0.6 mg by mouth three (3) times daily as needed. Provider, Historical  
rOPINIRole (REQUIP) 0.5 mg tablet Take 1 Tab by mouth nightly as needed. 4/21/14   Grady Watkins MD  
 
 
Hospital Medications: 
Current Facility-Administered Medications Medication Dose Route Frequency  sodium chloride (NS) flush 5-10 mL  5-10 mL IntraVENous PRN  
 0.9% sodium chloride infusion 250 mL  250 mL IntraVENous PRN  
 metroNIDAZOLE (FLAGYL) IVPB premix 500 mg  500 mg IntraVENous ONCE  
 NOREPINephrine (LEVOPHED) 4 mg/250 mL (16 mcg/mL) in NS infusion  2-16 mcg/min IntraVENous TITRATE  sodium chloride (NS) flush 5-40 mL  5-40 mL IntraVENous Q8H  
 sodium chloride (NS) flush 5-40 mL  5-40 mL IntraVENous PRN  
 diatrizoate leonidas-diatrizoat sod (MD-GASTROVIEW,GASTROGRAFIN) 66-10 % contrast solution 15 mL  15 mL Oral RAD ONCE  
 ondansetron (ZOFRAN) injection 4 mg  4 mg IntraVENous Q4H PRN  
 0.9% sodium chloride infusion  100 mL/hr IntraVENous CONTINUOUS Current Outpatient Medications Medication Sig  clopidogrel (PLAVIX) 75 mg tab Take 75 mg by mouth.  insulin glargine (LANTUS) 100 unit/mL injection 50 Units by SubCUTAneous route nightly.  insulin lispro (HUMALOG) 100 unit/mL injection Per SSI  metoprolol (LOPRESSOR) 25 mg tablet Take 1 Tab by mouth two (2) times a day.  Blood-Glucose Meter (ONETOUCH ULTRAMINI) monitoring kit Use as directed  glucose blood VI test strips (ONETOUCH ULTRA TEST) strip Test 4 times daily as directed  Lancets (ONETOUCH ULTRASOFT LANCETS) misc Test 4 times daily as directed  losartan (COZAAR) 100 mg tablet Take  by mouth daily.  furosemide (LASIX) 20 mg tablet Take  by mouth two (2) times a day.  Omeprazole delayed release (PRILOSEC D/R) 20 mg tablet Take 1 Tab by mouth daily.  rosuvastatin (CRESTOR) 20 mg tablet Take 1 Tab by mouth nightly.  gabapentin (NEURONTIN) 100 mg capsule Take 1 Cap by mouth three (3) times daily.  colchicine (COLCRYS) 0.6 mg tablet Take 0.6 mg by mouth three (3) times daily as needed.  rOPINIRole (REQUIP) 0.5 mg tablet Take 1 Tab by mouth nightly as needed. Social History: 
Social History Tobacco Use  Smoking status: Never Smoker  Smokeless tobacco: Never Used Substance Use Topics  Alcohol use: Yes Comment: socially Pt denies any history of drug use, blood transfusions, or tattoos. Family History: 
Family History Problem Relation Age of Onset  Hypertension Mother  Cancer Mother  Diabetes Mother  Heart Disease Mother Review of Systems: A detailed 10 system ROS is obtained, with pertinent positives as listed above. All others are negative. Diet:  NPO Objective:  
 
Physical Exam: 
Vitals: 
Visit Vitals BP 92/50 Pulse 70 Resp 24 Ht 5' 3\" (1.6 m) Wt 81.6 kg (180 lb) SpO2 96% BMI 31.89 kg/m² Gen:  Pt is alert, cooperative, no acute distress SISTER, DAUGHTER AND PULMONARY AT BEDSIDE; ILL APPEARING Skin:  Extremities and face reveal no rashes. HEENT: Sclerae anicteric. Extra-occular muscles are intact. No oral ulcers. No abnormal pigmentation of the lips. The neck is supple. Cardiovascular: Regular rate and rhythm. No murmurs, gallops, or rubs. Respiratory: TACHYPNEA ON O2 Clear breath sounds anteriorly with no wheezes, rales, or rhonchi.  
GI:  Abdomen nondistended, soft, REBOUND TENDERNESS RLQ; SOME TENDERNESS LLQ Normal active bowel sounds. No enlargement of the liver or spleen. No masses palpable. Rectal:  Deferred Musculoskeletal:  No pitting edema of the lower legs. Neurological:  Gross memory appears intact. Patient is alert and oriented. Psychiatric:  Mood appears appropriate with judgement intact. Lymphatic:  No cervical or supraclavicular adenopathy. Laboratory:   
Recent Labs 08/08/19 
1228 WBC 32.7* HGB 6.8* HCT 21.4*  
 MCV 96.8 *  
K 5.0  
CL 97* CO2 20* * CREA 3.35* CA 8.5 * * SGOT 4,996* ALT 2,586* TBILI 0.4 ALB 2.8*  
TP 5.5* LPSE 2,516* Assessment:  
 
Active Problems: 
  Uncontrolled type 2 diabetes mellitus with hyperglycemia ([facility] (6/15/2015) Chronic kidney disease, stage III (moderate) (HCC) (6/15/2015) Septic shock ([facility] (8/8/2019) Acute renal failure (ARF) ([facility] [facility] (8/8/2019) Elevated liver function tests (8/8/2019) Anemia (8/8/2019) 78 y.o. female with PMH of (but not limited to) CAD with history of CABG on Plavix, DM, HTN, HLD, hx of ischemic colitis, diverticular disease, an ampullary stenosis who is seen in consultation at the request of Dr. Rinku Cornejo for septic shock with abnormal LFTS. [facility] ERCP on 7/24/19 by Dr. Topher Thomas revealed dilated CBD, small stone with extraction and probable ampullary stenosis. She is being admitted to the ICU today with leukocytosis, septic shock, transaminitis and hyperlipasemia. CT of the abdomen/pelvis is pending. Acute pancreatitis, shocked liver are in the differential as well as possible perforation, cholangitis, appendicitis. Labs 8/8/19:WBC 32.7, Hgb of 6.8 (previously 9.9 on 2/4), glucose 550, (20), Creat 3.35 (1.44), T bili 0.4, ALT 2586, AST 4996, , lipase 2516, procalcitonin 9.5. Plan: 1. Await results of CT of the abdomen/pelvis 2. Agree with septic workup 3. Agree with antibiotic therapy 4. Agree with transfusion. 5.Supportive care 6. Will follow Connie Nelson. [address] [facility] Patient is seen and examined in collaboration with Dr. Isaiah Boyd. Assessment and plan as per Dr. Isaiah Boyd.

## 2019-08-08 NOTE — PROGRESS NOTES
TRANSFER - IN REPORT: 
 
Verbal report received from Trey Miranda RN(name) on Jerod Melgoza  being received from ED(unit) for urgent transfer Report consisted of patients Situation, Background, Assessment and  
Recommendations(SBAR). Information from the following report(s) SBAR, Kardex, ED Summary, Intake/Output, MAR, Recent Results and Cardiac Rhythm NSR was reviewed with the receiving nurse. Opportunity for questions and clarification was provided. Assessment completed upon patients arrival to unit and care assumed.

## 2019-08-08 NOTE — PROCEDURES
Procedure:  
Ricky Rosales [address]. 8. Indication:  
Septic shock Summary: 
Informed consent was obtained. A time-out was performed. Using the Sonosite ultrasound with the 5-10 MHz vascular probe transducer and sterile technique, the LEFT IJ vein was identified and under direct real time visualization was cannulated. The CVC kit guide wire was then inserted and its position within the LEFT IJ vein verified in short and long axis views on vascular probe US. Using Seldinger technique, a quad lumen catheter was then placed in the left IJ vein, after dilation of entry port without difficulty. There was no significant bleeding during the procedure and good flush and drawback was noted. The CVC was then affixed with supplied 2.0 silk sutures via the proximal and distal limiters, with the insertion point affixed at 16 cm. A biostatic disc was applied to the entry port followed by a transparent dressing. A chest x-ray is ordered to confirm proper placement. **Of note, there was difficulty passing both wire beyond 20cm and the catheter beyond 16cm, despite repositioning of wire. Ultrasound confirmed proper entry into L IJ vein. All ports are functioning but malposition in course is a possibility.   
 
Neena Burnham MD

## 2019-08-08 NOTE — ED PROVIDER NOTES
78-year-old black female presents the emergency department complaining of nausea vomiting diarrhea and abdominal pain over the last week. Patient was seen a week ago and had an ERCP for a dilated biliary ducts as well as elevated LFTs. According the daughter since that time the patient is been weak with poor p.o. intake and intermittent nausea vomiting and diarrhea. Patient states the symptoms got much worse last night. She denies any knowledge of fever or chills. The history is provided by the patient, the EMS personnel and a relative. The history is limited by the condition of the patient. Abdominal Pain This is a new problem. The current episode started more than 2 days ago. The problem occurs constantly. The problem has been rapidly worsening. The pain is associated with an unknown factor. The pain is located in the generalized abdominal region. The quality of the pain is aching. The pain is at a severity of 9/10. Associated symptoms include anorexia, diarrhea, nausea and vomiting. Pertinent negatives include no fever, no belching, no flatus, no hematochezia, no melena, no constipation, no dysuria, no frequency, no hematuria, no headaches, no arthralgias, no myalgias, no trauma, no chest pain and no back pain. The pain is worsened by eating, activity and palpation. The pain is relieved by nothing. Past workup includes esophagogastroduodenoscopy (Less than 2 weeks ago). Her past medical history is significant for DM. Her past medical history does not include PUD, gallstones, GERD, ulcerative colitis, Crohn's disease, irritable bowel syndrome, cancer, UTI, pancreatitis, ectopic pregnancy, ovarian cysts, diverticulitis, atrial fibrillation, kidney stones or small bowel obstruction. The patient's surgical history includes cholecystectomy. Past Medical History:  
Diagnosis Date  CAD (coronary artery disease)  DM2 (diabetes mellitus, type 2) ([facility] [address]  Gout  HLD (hyperlipidemia)  HTN (hypertension)  Peripheral neuropathy Past Surgical History:  
Procedure Laterality Date  CARDIAC SURG PROCEDURE UNLIST    
 stents x 3 2009  HX CHOLECYSTECTOMY jennifer in 1964  HX CORONARY ARTERY BYPASS GRAFT    
 x3  
 HX TUBAL LIGATION Family History:  
Problem Relation Age of Onset  Hypertension Mother  Cancer Mother  Diabetes Mother  Heart Disease Mother Social History Socioeconomic History  Marital status: SINGLE Spouse name: Not on file  Number of children: Not on file  Years of education: Not on file  Highest education level: Not on file Occupational History  Not on file Social Needs  Financial resource strain: Not on file  Food insecurity:  
  Worry: Not on file Inability: Not on file  Transportation needs:  
  Medical: Not on file Non-medical: Not on file Tobacco Use  Smoking status: Never Smoker  Smokeless tobacco: Never Used Substance and Sexual Activity  Alcohol use: Yes Comment: socially  Drug use: No  
 Sexual activity: Not on file Lifestyle  Physical activity:  
  Days per week: Not on file Minutes per session: Not on file  Stress: Not on file Relationships  Social connections:  
  Talks on phone: Not on file Gets together: Not on file Attends Sabianism service: Not on file Active member of club or organization: Not on file Attends meetings of clubs or organizations: Not on file Relationship status: Not on file  Intimate partner violence:  
  Fear of current or ex partner: Not on file Emotionally abused: Not on file Physically abused: Not on file Forced sexual activity: Not on file Other Topics Concern  Not on file Social History Narrative  Not on file ALLERGIES: Sulfa (sulfonamide antibiotics) Review of Systems Constitutional: Positive for activity change, appetite change and fatigue. Negative for diaphoresis and fever. Cardiovascular: Negative for chest pain, palpitations and leg swelling. Gastrointestinal: Positive for abdominal pain, anorexia, diarrhea, nausea and vomiting. Negative for blood in stool, constipation, flatus, hematochezia and melena. Genitourinary: Negative for dysuria, frequency and hematuria. Musculoskeletal: Negative for arthralgias, back pain and myalgias. Neurological: Negative for headaches. All other systems reviewed and are negative. There were no vitals filed for this visit. Physical Exam  
Constitutional: She is oriented to person, place, and time. She appears well-developed and well-nourished. She appears lethargic. She is cooperative. She appears ill. No distress. HENT:  
Head: Normocephalic and atraumatic. Right Ear: External ear normal.  
Left Ear: External ear normal.  
Mouth/Throat: Oropharynx is clear and moist.  
Eyes: Pupils are equal, round, and reactive to light. Conjunctivae and EOM are normal.  
Neck: Normal range of motion. Neck supple. Cardiovascular: Normal rate, regular rhythm, normal heart sounds and intact distal pulses. Pulmonary/Chest: Effort normal and breath sounds normal.  
Abdominal: Soft. Bowel sounds are normal. She exhibits no shifting dullness, no distension, no pulsatile liver, no fluid wave, no abdominal bruit, no ascites, no pulsatile midline mass and no mass. There is no hepatosplenomegaly. There is tenderness (Mild) in the epigastric area. There is no rigidity, no rebound, no guarding, no CVA tenderness, no tenderness at McBurney's point and negative Cramer's sign. No hernia. Musculoskeletal: Normal range of motion. She exhibits no edema. Neurological: She is oriented to person, place, and time. She has normal strength. She appears lethargic. No cranial nerve deficit or sensory deficit. Skin: Skin is warm and dry. Capillary refill takes less than 2 seconds. Psychiatric: She has a normal mood and affect. Her speech is normal. She is slowed. Cognition and memory are normal.  
Nursing note and vitals reviewed. MDM Number of Diagnoses or Management Options Acute kidney injury [facility]): new and requires workup Acute pancreatitis, unspecified complication status, unspecified pancreatitis type: new and requires workup Anemia, unspecified type: new and requires workup Hepatitis: new and requires workup Hyperglycemia: new and requires workup Septic shock [facility]): new and requires workup Amount and/or Complexity of Data Reviewed Clinical lab tests: ordered and reviewed Tests in the radiology section of CPT®: ordered and reviewed Obtain history from someone other than the patient: yes Review and summarize past medical records: yes (Hospitalist Discharge Summary 
  
Patient ID: 
Rajinder Lagunas [id number] 
6 y.o. 
1940 Admit date: 1/30/2019 10:03 AM 
Discharge date and time: 2/6/2019 Attending:      Brenda Delarosa MD 
PCP:                Cedrick Moran MD 
Treatment Team: Attending Provider: Brenda Delarosa MD; Utilization Review: Benja Pinedo RN; Primary Nurse: Mono Barr; Care Manager: Neha Haynes LMSW; Consulting Provider: Yue German MD; Charge Nurse: Susan Peters Speech Language Pathologist: LOI Bass 
  
Principal Diagnosis Acute metabolic encephalopathy Principal Problem: 
  Acute metabolic encephalopathy (0/51/1519) 
  Active Problems: 
  Uncontrolled type 2 diabetes mellitus with hyperglycemia ([address] (6/15/2015) 
  Essential hypertension (6/15/2015) 
  Chronic kidney disease, stage III (moderate) (HCC) (6/15/2015) 
  
  Hyperglycemia due to type 2 diabetes mellitus ([address] (1/30/2019) 
  Acute cystitis without hematuria (1/30/2019) 
  Seizure-like activity ([address] (2/1/2019) 
  Meningitis (2/3/2019)   
  
  
  
  
Hospital Course: Please refer to the admission H&P for details of presentation. In summary, the patient is [de-identified] y/o F with DM, HTN, recurrent UTIs on suppressive macrobid, who presented from home with altered mental status.  Was severely altered and could not respond or make eye contact. Marietta Zepeda has a caretaker who helps her in the morning but otherwise lives alone.  Was admitted with UTI and AoCKD.  Started on rocephin, IVF. Eliana Sos was ordered due to failure to improve and recurrent low grade fever.  Was extremely lethargic so neuro consulted on 2/1.  Concern for meningitis so CSF obtained which had glc<1.  Pt started on vanc/rocephin/amp/acyclovir after CSF obtained. MRI unremarkable. EEG showed severe encephalopathy.  ID consulted - lower suspicion for meningitis.  Her mental status vastly improved on 2/2. 
  
Acute metabolic encephalopathy Lower suspicion for meningitis. Likely secondary to UTI. -MRI no acute abnormalities 
  
ESBL E coli in Urine 
- continued Merrem per ID while inpt, change to fosfomycin to complete 2 additional doses at discharge 
  
HyperNa 
-resolved 
  
DM2 with hyperglycemia Fasting glucoses low but with high to the 400s in the afternoon. Noncompliant with dietary restrictions. Nsg has found box of muffins under her bed. - decrease lantus to 50 units 
- SSI 
- encourage dietary compliance 
  
HTN 
- cont losartan, lasix, metoprolol  
  AoCKD 
-at baseline now ) Discuss the patient with other providers: yes Independent visualization of images, tracings, or specimens: yes Risk of Complications, Morbidity, and/or Mortality Presenting problems: high Diagnostic procedures: moderate Management options: high Critical Care Total time providing critical care: (=================================================================== This patient is critically ill and there is a high probability of of imminent or life threatening deterioration in the patient's condition without immediate management. The nature of the patient's clinical problem is: Septic shock, hepatitis, hyperglycemia, acute kidney injury, acute pancreatitis and anemia I have spent 50 minutes in direct patient care, documentation, review of labs/xrays/old records, discussion with Family, Staff, Colleague, Nursing . The time involved in the performance of separately reportable procedures was not counted toward critical care time. John Rivera MD; 8/8/2019 @1:35 PM 
=================================================================== 
 
) Patient Progress Patient progress: (Condition remains critical) Procedures The patient was observed in the ED. Results Reviewed: 
 
 
Recent Results (from the past 24 hour(s)) POC LACTIC ACID Collection Time: 08/08/19 12:15 PM  
Result Value Ref Range Lactic Acid (POC) 6.14 (H) 0.5 - 1.9 mmol/L PROCALCITONIN Collection Time: 08/08/19 12:28 PM  
Result Value Ref Range Procalcitonin 9.5 ng/mL METABOLIC PANEL, COMPREHENSIVE Collection Time: 08/08/19 12:28 PM  
Result Value Ref Range Sodium 132 (L) 136 - 145 mmol/L Potassium 5.0 3.5 - 5.1 mmol/L Chloride 97 (L) 98 - 107 mmol/L  
 CO2 20 (L) 21 - 32 mmol/L Anion gap 15 7 - 16 mmol/L Glucose 550 (HH) 65 - 100 mg/dL  (H) 8 - 23 MG/DL Creatinine 3.35 (H) 0.6 - 1.0 MG/DL  
 GFR est AA 17 (L) >60 ml/min/1.73m2 GFR est non-AA 14 (L) >60 ml/min/1.73m2 Calcium 8.5 8.3 - 10.4 MG/DL Bilirubin, total 0.4 0.2 - 1.1 MG/DL  
 ALT (SGPT) 2,586 (H) 12 - 65 U/L  
 AST (SGOT) 4,996 (H) 15 - 37 U/L Alk. phosphatase 180 (H) 50 - 136 U/L Protein, total 5.5 (L) 6.3 - 8.2 g/dL Albumin 2.8 (L) 3.2 - 4.6 g/dL Globulin 2.7 2.3 - 3.5 g/dL A-G Ratio 1.0 (L) 1.2 - 3.5    
CBC WITH AUTOMATED DIFF Collection Time: 08/08/19 12:28 PM  
Result Value Ref Range WBC 32.7 (H) 4.3 - 11.1 K/uL  
 RBC 2.21 (L) 4.05 - 5.2 M/uL HGB 6.8 (LL) 11.7 - 15.4 g/dL HCT 21.4 (L) 35.8 - 46.3 % MCV 96.8 79.6 - 97.8 FL  
 MCH 30.8 26.1 - 32.9 PG  
 MCHC 31.8 31.4 - 35.0 g/dL  
 RDW 13.4 11.9 - 14.6 % PLATELET 170 116 - 029 K/uL MPV 12.3 9.4 - 12.3 FL ABSOLUTE NRBC 0.00 0.0 - 0.2 K/uL DF PENDING   
 
XR CHEST PORT Final Result XR CHEST SNGL V    (Results Pending)

## 2019-08-08 NOTE — PROGRESS NOTES
Shift report given to Melissa Gerardo RN and dual verification of insulin gtt performed. Levo placed on standby. VSS.

## 2019-08-08 NOTE — H&P
HISTORY AND PHYSICAL Rafaela Arguello 8/8/2019 Date of Admission:  8/8/2019 The patient's chart is reviewed and the patient is discussed with the staff. Subjective:  
 
Patient is a 78 y.o.  female presents with nausea, vomiting and abdominal pain. She was recently found to have elevated LFTs and was evaluated by gastroenterology with a nondiagnostic EUS (6/19/19) and an ERCP (7/24/19) that revealed a common bile duct stone. Ampullary stenosis was suspected and office follow up was recommended. She has not felt well since the ERCP in that her appetite has been poor and she has been nauseated and actually vomiting 2 to 3 times per day. Her bowel movements have been loose. She developed right lower quadrant pain that extended to her back, yesterday that worsened this morning so she presented to the ER. She states that the pain would be a \"10' at times but then would lessen. In the ER, she was found to be in shock with hypotension. She has received 2 liters of IV fluids and been started on Levophed. Blood cultures have been obtained and Rocephin and Flagyl ordered. She is anemic and a transfusion has been ordered. She has not noticed any blood in her stools or emesis. Other chronic medical problems include CAD with stents, previous CABG, DM, CKD, HTN and chronic UTIs. Her urine culture in Feb grew an ESBL E Coli. She was seen by ID and treated Fosfomycin. She takes Cipro as prophylactic therapy. Review of Systems A comprehensive review of systems was negative except for: Constitutional: positive for fatigue and anorexia Eyes: positive for none Ears, nose, mouth, throat, and face: positive for none Respiratory: positive for none Cardiovascular: positive for none Gastrointestinal: positive for nausea, vomiting and diarrhea Genitourinary: positive for history recurrent UTIs Integument/breast: positive for none Hematologic/lymphatic: positive for none Musculoskeletal: positive for joint pain secondary to arthritis Neurological: positive for none Behvioral/Psych: positive for none Endocrine: positive for diabetes Allergic/Immunologic: positive for none Patient Active Problem List  
Diagnosis Code  Coronary artery disease involving coronary bypass graft of native heart without angina pectoris I25.810  
 Uncontrolled type 2 diabetes mellitus with hyperglycemia ([facility]) E11.65  
 Essential hypertension I10  
 HLD (hyperlipidemia) E78.5  Chronic kidney disease, stage III (moderate) ([facility]) N18.3  Osteopenia M85.80  Vitamin D deficiency E55.9  Gastroesophageal reflux disease without esophagitis K21.9  Other allergic rhinitis J30.89  Peripheral neuropathy G62.9  
 Primary osteoarthritis involving multiple joints M15.0  Insomnia G47.00  RLS (restless legs syndrome) G25.81  Septic shock ([facility]) A41.9, R65.21  
 Acute renal failure (ARF) ([facility]) N17.9  Elevated liver function tests R94.5  Anemia D64.9  Right lower quadrant abdominal pain R10.31 Prior to Admission Medications Prescriptions Last Dose Informant Patient Reported? Taking? Blood-Glucose Meter (ONETOUCH ULTRAMINI) monitoring kit   No No  
Sig: Use as directed Lancets (ONETOUCH ULTRASOFT LANCETS) misc   No No  
Sig: Test 4 times daily as directed Omeprazole delayed release (PRILOSEC D/R) 20 mg tablet   No No  
Sig: Take 1 Tab by mouth daily. clopidogrel (PLAVIX) 75 mg tab   Yes No  
Sig: Take 75 mg by mouth.  
colchicine (COLCRYS) 0.6 mg tablet   Yes No  
Sig: Take 0.6 mg by mouth three (3) times daily as needed. furosemide (LASIX) 20 mg tablet   Yes No  
Sig: Take  by mouth two (2) times a day.  
gabapentin (NEURONTIN) 100 mg capsule   No No  
Sig: Take 1 Cap by mouth three (3) times daily. glucose blood VI test strips (ONETOUCH ULTRA TEST) strip   No No  
Sig: Test 4 times daily as directed insulin glargine (LANTUS) 100 unit/mL injection   No No  
Si Units by SubCUTAneous route nightly. insulin lispro (HUMALOG) 100 unit/mL injection   No No  
Sig: Per SSI  
losartan (COZAAR) 100 mg tablet   Yes No  
Sig: Take  by mouth daily. metoprolol (LOPRESSOR) 25 mg tablet   No No  
Sig: Take 1 Tab by mouth two (2) times a day. rOPINIRole (REQUIP) 0.5 mg tablet   No No  
Sig: Take 1 Tab by mouth nightly as needed. rosuvastatin (CRESTOR) 20 mg tablet   No No  
Sig: Take 1 Tab by mouth nightly. Facility-Administered Medications: None Past Medical History:  
Diagnosis Date  Acute cystitis without hematuria 2019  CAD (coronary artery disease)  DM2 (diabetes mellitus, type 2) ([facility]  Gout  HLD (hyperlipidemia)  HTN (hypertension)  Peripheral neuropathy Past Surgical History:  
Procedure Laterality Date  CARDIAC SURG PROCEDURE UNLIST    
 stents x 3 2009  HX CHOLECYSTECTOMY jennifer in 1964  HX CORONARY ARTERY BYPASS GRAFT    
 x3  
 HX TUBAL LIGATION Social History Socioeconomic History  Marital status: SINGLE Spouse name: Not on file  Number of children: Not on file  Years of education: Not on file  Highest education level: Not on file Occupational History  Occupation: retired  Social Needs  Financial resource strain: Not on file  Food insecurity:  
  Worry: Not on file Inability: Not on file  Transportation needs:  
  Medical: Not on file Non-medical: Not on file Tobacco Use  Smoking status: Never Smoker  Smokeless tobacco: Never Used Substance and Sexual Activity  Alcohol use: Yes Comment: socially  Drug use: No  
 Sexual activity: Not on file Lifestyle  Physical activity:  
  Days per week: Not on file Minutes per session: Not on file  Stress: Not on file Relationships  Social connections:  
  Talks on phone: Not on file Gets together: Not on file Attends [employer] service: Not on file Active member of club or organization: Not on file Attends meetings of clubs or organizations: Not on file Relationship status: Not on file  Intimate partner violence:  
  Fear of current or ex partner: Not on file Emotionally abused: Not on file Physically abused: Not on file Forced sexual activity: Not on file Other Topics Concern  Not on file Social History Narrative Lives alone but has a caregiver who helps several hours per day, several days per week Family History Problem Relation Age of Onset  Hypertension Mother  Cancer Mother  Diabetes Mother  Heart Disease Mother Allergies Allergen Reactions  Sulfa (Sulfonamide Antibiotics) Swelling Current Facility-Administered Medications Medication Dose Route Frequency  sodium chloride (NS) flush 5-10 mL  5-10 mL IntraVENous PRN  
 0.9% sodium chloride infusion 250 mL  250 mL IntraVENous PRN  
 metroNIDAZOLE (FLAGYL) IVPB premix 500 mg  500 mg IntraVENous ONCE  
 NOREPINephrine (LEVOPHED) 4 mg/250 mL (16 mcg/mL) in NS infusion  2-16 mcg/min IntraVENous TITRATE  sodium chloride (NS) flush 5-40 mL  5-40 mL IntraVENous Q8H  
 sodium chloride (NS) flush 5-40 mL  5-40 mL IntraVENous PRN  
 diatrizoate leonidas-diatrizoat sod (MD-GASTROVIEW,GASTROGRAFIN) 66-10 % contrast solution 15 mL  15 mL Oral RAD ONCE  
 ondansetron (ZOFRAN) injection 4 mg  4 mg IntraVENous Q4H PRN  
 0.9% sodium chloride infusion  100 mL/hr IntraVENous CONTINUOUS  
 [START ON 8/9/2019] cefTRIAXone (ROCEPHIN) 2 g in 0.9% sodium chloride (MBP/ADV) 50 mL  2 g IntraVENous Q24H  
 [START ON 8/9/2019] metroNIDAZOLE (FLAGYL) IVPB premix 500 mg  500 mg IntraVENous Q12H  pantoprazole (PROTONIX) 40 mg in sodium chloride 0.9% 10 mL injection  40 mg IntraVENous Q24H  
 [START ON 8/9/2019] insulin glargine (LANTUS) injection 12 Units  12 Units SubCUTAneous DAILY  insulin regular (NOVOLIN R, HUMULIN R) injection   SubCUTAneous Q6H Current Outpatient Medications Medication Sig  clopidogrel (PLAVIX) 75 mg tab Take 75 mg by mouth.  insulin glargine (LANTUS) 100 unit/mL injection 50 Units by SubCUTAneous route nightly.  insulin lispro (HUMALOG) 100 unit/mL injection Per SSI  metoprolol (LOPRESSOR) 25 mg tablet Take 1 Tab by mouth two (2) times a day.  Blood-Glucose Meter (ONETOUCH ULTRAMINI) monitoring kit Use as directed  glucose blood VI test strips (ONETOUCH ULTRA TEST) strip Test 4 times daily as directed  Lancets (ONETOUCH ULTRASOFT LANCETS) misc Test 4 times daily as directed  losartan (COZAAR) 100 mg tablet Take  by mouth daily.  furosemide (LASIX) 20 mg tablet Take  by mouth two (2) times a day.  Omeprazole delayed release (PRILOSEC D/R) 20 mg tablet Take 1 Tab by mouth daily.  rosuvastatin (CRESTOR) 20 mg tablet Take 1 Tab by mouth nightly.  gabapentin (NEURONTIN) 100 mg capsule Take 1 Cap by mouth three (3) times daily.  colchicine (COLCRYS) 0.6 mg tablet Take 0.6 mg by mouth three (3) times daily as needed.  rOPINIRole (REQUIP) 0.5 mg tablet Take 1 Tab by mouth nightly as needed. Objective:  
 
Vitals:  
 08/08/19 1313 08/08/19 1343 08/08/19 1353 08/08/19 1403 BP: (!) 72/43 (!) 78/48 95/50 92/50 Pulse: 64 68 68 70 Resp: 18 (!) 33 30 24 SpO2: 96% 99% 95% 96% Weight:      
Height: PHYSICAL EXAM  
 
Constitutional:  the patient is well developed and in no acute distress HEENT:  Sclera clear, pupils equal, oral mucosa moist 
Respiratory: clear bilaterally. Cardiovascular:  RRR without M,G,R 
Abd/GI: soft and with tenderness in the right lower quadrant; with positive bowel sounds. Ext: warm without cyanosis. There is no lower leg edema. Skin:  no jaundice or rashes, no wounds Neuro: no gross neuro deficits Musculoskeletal: moves all four extremities with equal strength. No deformities Chest Xray: 
 
 
Recent Labs 08/08/19 
1228 WBC 32.7* HGB 6.8* HCT 21.4*  
 Recent Labs 08/08/19 
1228 *  
K 5.0  
CL 97* * CO2 20* * CREA 3.35* CA 8.5 ALB 2.8* SGOT 4,996* Assessment:  (Medical Decision Making) Hospital Problems  Date Reviewed: 1/30/2019 Codes Class Noted POA Septic shock (HCC) ICD-10-CM: A41.9, R65.21 ICD-9-CM: 038.9, 785.52, 995.92  8/8/2019 Yes Acute renal failure (ARF) (HCC) ICD-10-CM: N17.9 ICD-9-CM: 584.9  8/8/2019 Yes Elevated liver function tests ICD-10-CM: R94.5 ICD-9-CM: 790.6  8/8/2019 Yes Anemia ICD-10-CM: D64.9 ICD-9-CM: 285.9  8/8/2019 Yes Right lower quadrant abdominal pain ICD-10-CM: R10.31 ICD-9-CM: 789.03  8/8/2019 Unknown Uncontrolled type 2 diabetes mellitus with hyperglycemia (HCC) (Chronic) ICD-10-CM: E11.65 ICD-9-CM: 250.02  6/15/2015 Yes Chronic kidney disease, stage III (moderate) (HCC) (Chronic) ICD-10-CM: N18.3 ICD-9-CM: 585.3  6/15/2015 Yes Plan:  (Medical Decision Making) 1. Admit to ICU. She has received 2 liters of IV fluids so far and she is still hypotensive - levophed started. Central line will be placed 2. Abx ordered in ER - [facility]. Blood cultures pending. Abd CT ordered and will be reviewed. Liver enzymes and lipase elevated. Gastroenterology has seen in consultation and is awaiting CT scan - etiology of abdominal pain not clear at present - ? Appendicitis, diverticulitis, or other 3. Place coyle - in acute renal failure. Monitor with volume resuscitation 4. Hold home antihypertensives, plavix (cardiac stents 2009) and lasix 5. SSI for now - lantus on hold for now 6. Transfuse PRBCs today - recheck hemoglobin. Check hemocult Alhaji Pena NP I have spoken with and examined the patient.  I agree with the above assessment and plan as documented. Sara Contreras is an 78yoF who presents with 2 days of progressively worsening RLQ abd pain and tenderness, nausea and septic shock. She also has a lipase of  
2516, AST/ALT of 4996/2586, lactic acidosis, PRATIMA, Gen: somewhat sleepy but arousable HEENT: scar from prior tracheostomy Lungs:  CTAB Heart:  RRR with no Murmur/Rubs/Gallops Abd: TTP with rebound in RLQ, decreased BS Ext: no edema Plan: 
--stat CT abd/pelvis and will follow closely for results --continue IV fluids (has received 2.5L thus far and continuing), needs CVC for vasopressors 
--cefriaxone/flagyl ongoing with cultures pending --insulin drip with glucose stabilizer 
--PPI, hold any heparin ppx until INR known --check FOBT 
--monitor urine output closely 
--serial lactic acid Valentin Tang MD 
 
More than 50% of time documented was spent face-to-face contact with the patient and in the care of the patient on the floor/unit where the patient is located

## 2019-08-08 NOTE — PROGRESS NOTES
Late entry due to hands on patient care. Patient currently A/O x 4. Temp approximately 95.7 temporal, warm blankets applied and temp gradually increasing. Denies pain at this time. NSR on the monitor. BP WDL with Levophed gtt infusing @ 4 mcg/min. O2 sat 100% on 2L NC. Lung sounds clear/diminished. Respirations even and unlabored. Abdomen non tender, semi soft, hypoactive bowel sounds. Patient denies abdominal pain and nausea. States last BM was last night and was loose. Clifton placed and draining cloudy/yellow urine. NPO. Sharyon Gong recently initiated after return from CT/CXR. CVC  placement verified and OK to use per Mackenzie Núñez MD. 1 unit PRBC's completed. ABX and NS infusing per MAR. Labs to be sent. SCD's. Patient's daughter updated on plan of care and given security code. Dual skin assessment performed upon arrival to ICU. Skin overall cool, dry, intact. No breakdown or redness noted to heels or sacrum. Allevyn placed to sacrum.

## 2019-08-08 NOTE — ED NOTES
TRANSFER - OUT REPORT: 
 
Verbal report given to Jose(name) on Kam Lyn  being transferred to 3110(unit) for routine progression of care Report consisted of patients Situation, Background, Assessment and  
Recommendations(SBAR). Information from the following report(s) ED Summary was reviewed with the receiving nurse. Lines:  
Peripheral IV 08/08/19 Right Antecubital (Active) Site Assessment Clean, dry, & intact 8/8/2019 12:17 PM  
Phlebitis Assessment 0 8/8/2019 12:17 PM  
Infiltration Assessment 0 8/8/2019 12:17 PM  
Dressing Status Clean, dry, & intact 8/8/2019 12:17 PM  
   
Peripheral IV 08/08/19 Right Wrist (Active) Site Assessment Clean, dry, & intact 8/8/2019 12:17 PM  
Phlebitis Assessment 0 8/8/2019 12:17 PM  
Infiltration Assessment 0 8/8/2019 12:17 PM  
Dressing Status Clean, dry, & intact 8/8/2019 12:17 PM  
   
Peripheral IV 08/08/19 Left Forearm (Active) Site Assessment Clean, dry, & intact 8/8/2019  2:26 PM  
Phlebitis Assessment 0 8/8/2019  2:26 PM  
Infiltration Assessment 0 8/8/2019  2:26 PM  
Dressing Status Clean, dry, & intact 8/8/2019  2:26 PM  
Hub Color/Line Status Pink 8/8/2019  2:26 PM  
  
 
Opportunity for questions and clarification was provided. Patient transported with: 
 O2 @ 1 liters

## 2019-08-08 NOTE — PROGRESS NOTES
Dr. Radha Caruso at bedside inserting CVC and notified of most recent lab results and BGL. Orders received for glucostabilizer.

## 2019-08-09 ENCOUNTER — APPOINTMENT (OUTPATIENT)
Dept: GENERAL RADIOLOGY | Age: 79
DRG: 871 | End: 2019-08-09
Attending: INTERNAL MEDICINE
Payer: MEDICARE

## 2019-08-09 ENCOUNTER — APPOINTMENT (OUTPATIENT)
Dept: ULTRASOUND IMAGING | Age: 79
DRG: 871 | End: 2019-08-09
Attending: INTERNAL MEDICINE
Payer: MEDICARE

## 2019-08-09 PROBLEM — R78.81 GRAM-NEGATIVE BACTEREMIA: Status: ACTIVE | Noted: 2019-08-09

## 2019-08-09 LAB
ADMINISTERED INITIALS, ADMINIT: NORMAL
ALBUMIN SERPL-MCNC: 2.2 G/DL (ref 3.2–4.6)
ALBUMIN/GLOB SERPL: 0.8 {RATIO} (ref 1.2–3.5)
ALP SERPL-CCNC: 165 U/L (ref 50–136)
ALT SERPL-CCNC: 1636 U/L (ref 12–65)
ANION GAP SERPL CALC-SCNC: 6 MMOL/L (ref 7–16)
ANION GAP SERPL CALC-SCNC: 9 MMOL/L (ref 7–16)
AST SERPL-CCNC: 1883 U/L (ref 15–37)
BILIRUB SERPL-MCNC: 1.1 MG/DL (ref 0.2–1.1)
BUN SERPL-MCNC: 117 MG/DL (ref 8–23)
BUN SERPL-MCNC: 127 MG/DL (ref 8–23)
CALCIUM SERPL-MCNC: 7.7 MG/DL (ref 8.3–10.4)
CALCIUM SERPL-MCNC: 8 MG/DL (ref 8.3–10.4)
CHLORIDE SERPL-SCNC: 110 MMOL/L (ref 98–107)
CHLORIDE SERPL-SCNC: 115 MMOL/L (ref 98–107)
CO2 SERPL-SCNC: 24 MMOL/L (ref 21–32)
CO2 SERPL-SCNC: 25 MMOL/L (ref 21–32)
CREAT SERPL-MCNC: 2.19 MG/DL (ref 0.6–1)
CREAT SERPL-MCNC: 2.47 MG/DL (ref 0.6–1)
D50 ADMINISTERED, D50ADM: 0 ML
D50 ORDER, D50ORD: 0 ML
ERYTHROCYTE [DISTWIDTH] IN BLOOD BY AUTOMATED COUNT: 13.5 % (ref 11.9–14.6)
GLOBULIN SER CALC-MCNC: 2.6 G/DL (ref 2.3–3.5)
GLSCOM COMMENTS: NORMAL
GLUCOSE BLD STRIP.AUTO-MCNC: 102 MG/DL (ref 65–100)
GLUCOSE BLD STRIP.AUTO-MCNC: 108 MG/DL (ref 65–100)
GLUCOSE BLD STRIP.AUTO-MCNC: 121 MG/DL (ref 65–100)
GLUCOSE BLD STRIP.AUTO-MCNC: 170 MG/DL (ref 65–100)
GLUCOSE BLD STRIP.AUTO-MCNC: 211 MG/DL (ref 65–100)
GLUCOSE BLD STRIP.AUTO-MCNC: 260 MG/DL (ref 65–100)
GLUCOSE BLD STRIP.AUTO-MCNC: 298 MG/DL (ref 65–100)
GLUCOSE BLD STRIP.AUTO-MCNC: 313 MG/DL (ref 65–100)
GLUCOSE BLD STRIP.AUTO-MCNC: 402 MG/DL (ref 65–100)
GLUCOSE SERPL-MCNC: 286 MG/DL (ref 65–100)
GLUCOSE SERPL-MCNC: 99 MG/DL (ref 65–100)
GLUCOSE, GLC: 108 MG/DL
GLUCOSE, GLC: 121 MG/DL
GLUCOSE, GLC: 170 MG/DL
GLUCOSE, GLC: 211 MG/DL
GLUCOSE, GLC: 260 MG/DL
GLUCOSE, GLC: 313 MG/DL
HCT VFR BLD AUTO: 20.7 % (ref 35.8–46.3)
HCT VFR BLD AUTO: 23.2 % (ref 35.8–46.3)
HGB BLD-MCNC: 6.8 G/DL (ref 11.7–15.4)
HGB BLD-MCNC: 7.6 G/DL (ref 11.7–15.4)
HIGH TARGET, HITG: 180 MG/DL
INSULIN ADMINSTERED, INSADM: 13.6 UNITS/HOUR
INSULIN ADMINSTERED, INSADM: 16 UNITS/HOUR
INSULIN ADMINSTERED, INSADM: 17.7 UNITS/HOUR
INSULIN ADMINSTERED, INSADM: 2.8 UNITS/HOUR
INSULIN ADMINSTERED, INSADM: 4.4 UNITS/HOUR
INSULIN ADMINSTERED, INSADM: 9.9 UNITS/HOUR
INSULIN ORDER, INSORD: 13.6 UNITS/HOUR
INSULIN ORDER, INSORD: 16 UNITS/HOUR
INSULIN ORDER, INSORD: 17.7 UNITS/HOUR
INSULIN ORDER, INSORD: 2.8 UNITS/HOUR
INSULIN ORDER, INSORD: 4.4 UNITS/HOUR
INSULIN ORDER, INSORD: 9.9 UNITS/HOUR
LACTATE SERPL-SCNC: 2 MMOL/L (ref 0.4–2)
LIPASE SERPL-CCNC: 1024 U/L (ref 73–393)
LOW TARGET, LOT: 140 MG/DL
MAGNESIUM SERPL-MCNC: 2.4 MG/DL (ref 1.8–2.4)
MAGNESIUM SERPL-MCNC: 2.4 MG/DL (ref 1.8–2.4)
MCH RBC QN AUTO: 30.4 PG (ref 26.1–32.9)
MCHC RBC AUTO-ENTMCNC: 32.8 G/DL (ref 31.4–35)
MCV RBC AUTO: 92.8 FL (ref 79.6–97.8)
MINUTES UNTIL NEXT BG, NBG: 60 MIN
MULTIPLIER, MUL: 0.06
MULTIPLIER, MUL: 0.07
MULTIPLIER, MUL: 0.07
MULTIPLIER, MUL: 0.08
MULTIPLIER, MUL: 0.09
MULTIPLIER, MUL: 0.09
NRBC # BLD: 0 K/UL (ref 0–0.2)
ORDER INITIALS, ORDINIT: NORMAL
PHOSPHATE SERPL-MCNC: 2.4 MG/DL (ref 2.3–3.7)
PLATELET # BLD AUTO: 123 K/UL (ref 150–450)
PMV BLD AUTO: 11.6 FL (ref 9.4–12.3)
POTASSIUM SERPL-SCNC: 4.6 MMOL/L (ref 3.5–5.1)
POTASSIUM SERPL-SCNC: 4.9 MMOL/L (ref 3.5–5.1)
PROT SERPL-MCNC: 4.8 G/DL (ref 6.3–8.2)
RBC # BLD AUTO: 2.5 M/UL (ref 4.05–5.2)
SODIUM SERPL-SCNC: 143 MMOL/L (ref 136–145)
SODIUM SERPL-SCNC: 146 MMOL/L (ref 136–145)
WBC # BLD AUTO: 21.5 K/UL (ref 4.3–11.1)

## 2019-08-09 PROCEDURE — 82962 GLUCOSE BLOOD TEST: CPT

## 2019-08-09 PROCEDURE — 83605 ASSAY OF LACTIC ACID: CPT

## 2019-08-09 PROCEDURE — 83690 ASSAY OF LIPASE: CPT

## 2019-08-09 PROCEDURE — 77030020245 HC SOL INJ 5% D/0.9%NACL

## 2019-08-09 PROCEDURE — 77030020263 HC SOL INJ SOD CL0.9% LFCR 1000ML

## 2019-08-09 PROCEDURE — 74011000302 HC RX REV CODE- 302: Performed by: INTERNAL MEDICINE

## 2019-08-09 PROCEDURE — 71045 X-RAY EXAM CHEST 1 VIEW: CPT

## 2019-08-09 PROCEDURE — P9016 RBC LEUKOCYTES REDUCED: HCPCS

## 2019-08-09 PROCEDURE — 74011250636 HC RX REV CODE- 250/636: Performed by: INTERNAL MEDICINE

## 2019-08-09 PROCEDURE — C9113 INJ PANTOPRAZOLE SODIUM, VIA: HCPCS | Performed by: INTERNAL MEDICINE

## 2019-08-09 PROCEDURE — 83735 ASSAY OF MAGNESIUM: CPT

## 2019-08-09 PROCEDURE — 65610000001 HC ROOM ICU GENERAL

## 2019-08-09 PROCEDURE — 74011636637 HC RX REV CODE- 636/637: Performed by: NURSE PRACTITIONER

## 2019-08-09 PROCEDURE — 93975 VASCULAR STUDY: CPT

## 2019-08-09 PROCEDURE — 80053 COMPREHEN METABOLIC PANEL: CPT

## 2019-08-09 PROCEDURE — 36430 TRANSFUSION BLD/BLD COMPNT: CPT

## 2019-08-09 PROCEDURE — 84100 ASSAY OF PHOSPHORUS: CPT

## 2019-08-09 PROCEDURE — 86580 TB INTRADERMAL TEST: CPT | Performed by: INTERNAL MEDICINE

## 2019-08-09 PROCEDURE — 77030039270 HC TU BLD FLTR CARD -A

## 2019-08-09 PROCEDURE — 74011636637 HC RX REV CODE- 636/637: Performed by: INTERNAL MEDICINE

## 2019-08-09 PROCEDURE — 85027 COMPLETE CBC AUTOMATED: CPT

## 2019-08-09 PROCEDURE — 74011000258 HC RX REV CODE- 258: Performed by: INTERNAL MEDICINE

## 2019-08-09 PROCEDURE — 99233 SBSQ HOSP IP/OBS HIGH 50: CPT | Performed by: INTERNAL MEDICINE

## 2019-08-09 PROCEDURE — 85018 HEMOGLOBIN: CPT

## 2019-08-09 RX ORDER — DEXTROSE MONOHYDRATE AND SODIUM CHLORIDE 5; .9 G/100ML; G/100ML
125 INJECTION, SOLUTION INTRAVENOUS CONTINUOUS
Status: DISCONTINUED | OUTPATIENT
Start: 2019-08-09 | End: 2019-08-10

## 2019-08-09 RX ORDER — INSULIN LISPRO 100 [IU]/ML
INJECTION, SOLUTION INTRAVENOUS; SUBCUTANEOUS
Status: DISCONTINUED | OUTPATIENT
Start: 2019-08-09 | End: 2019-08-10 | Stop reason: DRUGHIGH

## 2019-08-09 RX ORDER — INSULIN GLARGINE 100 [IU]/ML
20 INJECTION, SOLUTION SUBCUTANEOUS
Status: COMPLETED | OUTPATIENT
Start: 2019-08-09 | End: 2019-08-09

## 2019-08-09 RX ORDER — SODIUM CHLORIDE 9 MG/ML
125 INJECTION, SOLUTION INTRAVENOUS CONTINUOUS
Status: DISCONTINUED | OUTPATIENT
Start: 2019-08-09 | End: 2019-08-09

## 2019-08-09 RX ORDER — SODIUM CHLORIDE 9 MG/ML
250 INJECTION, SOLUTION INTRAVENOUS AS NEEDED
Status: DISCONTINUED | OUTPATIENT
Start: 2019-08-09 | End: 2019-08-14 | Stop reason: SDUPTHER

## 2019-08-09 RX ORDER — INSULIN GLARGINE 100 [IU]/ML
50 INJECTION, SOLUTION SUBCUTANEOUS
Status: DISCONTINUED | OUTPATIENT
Start: 2019-08-10 | End: 2019-08-16 | Stop reason: HOSPADM

## 2019-08-09 RX ORDER — INSULIN GLARGINE 100 [IU]/ML
30 INJECTION, SOLUTION SUBCUTANEOUS ONCE
Status: COMPLETED | OUTPATIENT
Start: 2019-08-09 | End: 2019-08-09

## 2019-08-09 RX ORDER — INSULIN LISPRO 100 [IU]/ML
INJECTION, SOLUTION INTRAVENOUS; SUBCUTANEOUS
Status: DISCONTINUED | OUTPATIENT
Start: 2019-08-09 | End: 2019-08-09

## 2019-08-09 RX ORDER — VANCOMYCIN 1.75 GRAM/500 ML IN 0.9 % SODIUM CHLORIDE INTRAVENOUS
1750 ONCE
Status: COMPLETED | OUTPATIENT
Start: 2019-08-09 | End: 2019-08-09

## 2019-08-09 RX ORDER — DEXTROSE MONOHYDRATE AND SODIUM CHLORIDE 5; .9 G/100ML; G/100ML
125 INJECTION, SOLUTION INTRAVENOUS CONTINUOUS
Status: DISCONTINUED | OUTPATIENT
Start: 2019-08-09 | End: 2019-08-09

## 2019-08-09 RX ADMIN — Medication 10 ML: at 15:56

## 2019-08-09 RX ADMIN — DEXTROSE MONOHYDRATE AND SODIUM CHLORIDE 125 ML/HR: 5; .9 INJECTION, SOLUTION INTRAVENOUS at 11:49

## 2019-08-09 RX ADMIN — DEXTROSE MONOHYDRATE AND SODIUM CHLORIDE 125 ML/HR: 5; .9 INJECTION, SOLUTION INTRAVENOUS at 19:58

## 2019-08-09 RX ADMIN — INSULIN LISPRO 6 UNITS: 100 INJECTION, SOLUTION INTRAVENOUS; SUBCUTANEOUS at 15:55

## 2019-08-09 RX ADMIN — CEFTRIAXONE SODIUM 2 G: 2 INJECTION, POWDER, FOR SOLUTION INTRAMUSCULAR; INTRAVENOUS at 13:17

## 2019-08-09 RX ADMIN — INSULIN GLARGINE 30 UNITS: 100 INJECTION, SOLUTION SUBCUTANEOUS at 22:38

## 2019-08-09 RX ADMIN — DEXTROSE MONOHYDRATE AND SODIUM CHLORIDE 125 ML/HR: 5; .9 INJECTION, SOLUTION INTRAVENOUS at 15:57

## 2019-08-09 RX ADMIN — VANCOMYCIN HYDROCHLORIDE 1750 MG: 10 INJECTION, POWDER, LYOPHILIZED, FOR SOLUTION INTRAVENOUS at 05:36

## 2019-08-09 RX ADMIN — SODIUM CHLORIDE 100 ML/HR: 900 INJECTION, SOLUTION INTRAVENOUS at 00:05

## 2019-08-09 RX ADMIN — Medication 10 ML: at 05:30

## 2019-08-09 RX ADMIN — METRONIDAZOLE 500 MG: 500 INJECTION, SOLUTION INTRAVENOUS at 02:15

## 2019-08-09 RX ADMIN — METRONIDAZOLE 500 MG: 500 INJECTION, SOLUTION INTRAVENOUS at 15:55

## 2019-08-09 RX ADMIN — INSULIN GLARGINE 20 UNITS: 100 INJECTION, SOLUTION SUBCUTANEOUS at 04:33

## 2019-08-09 RX ADMIN — Medication 10 ML: at 22:41

## 2019-08-09 RX ADMIN — TUBERCULIN PURIFIED PROTEIN DERIVATIVE 5 UNITS: 5 INJECTION, SOLUTION INTRADERMAL at 11:53

## 2019-08-09 RX ADMIN — SODIUM CHLORIDE 40 MG: 9 INJECTION, SOLUTION INTRAMUSCULAR; INTRAVENOUS; SUBCUTANEOUS at 15:55

## 2019-08-09 RX ADMIN — INSULIN LISPRO 15 UNITS: 100 INJECTION, SOLUTION INTRAVENOUS; SUBCUTANEOUS at 22:38

## 2019-08-09 NOTE — PROGRESS NOTES
Rome Cerna NP paged with regards to plan of care now that [address] [facility] [address] showed patent portal system. She states she will contact Dr Song Lopez for plan.

## 2019-08-09 NOTE — PROGRESS NOTES
Spoke with daughter, Booker Camacho, after admission to ICU s/p septic shock. Confirms demographics. States if pt needs STR, she would like [facility], pt has been before. If safe to return home, HH is okay. CM to follow for d/c needs per MD.  
 
Care Management Interventions PCP Verified by CM: Eileen Roche) Mode of Transport at Discharge: Other (see comment) Transition of Care Consult (CM Consult): Discharge Planning Discharge Durable Medical Equipment: (mason, w/c) Current Support Network: Lives Alone, Own Home(daughter - Booker Camacho -[phone]/[employer] aid 3xweekly for couple of hours a day) Confirm Follow Up Transport: Family Freedom of Choice Offered: Yes [facility] [employer] Information Provided?: (confirms [facility] - able to get rx) Discharge Location Discharge Placement: Unable to determine at this time

## 2019-08-09 NOTE — PROGRESS NOTES
[facility] Progress Note Admit Date:  8/8/2019 Today's Date:  8/9/2019 CC:  Septic shock; abnormal LFTs Subjective:  
 
Patient: Patient alert feeling better this am. Asking for ice chips. She denies any abdominal pain, N&V, or diarrhea currently. Off insulin drip and vasopressors. CT ABDOMEN AND PELVIS WITHOUT INTRAVENOUS CONTRAST DATED 8/8/2019. 
  
History: Sepsis.  
  
Comparison: None.  
  
Technique:   Multiple contiguous helical CT images reconstructed at 5 mm 
intervals were obtained from above the diaphragms through the ischial 
tuberosities following without intravenous or oral contrast at the request of 
the ordering physician. All CT scans performed at this facility use one or all 
of the following: Automated exposure control, adjustment of the mA and/or kVp 
according to patient's size, iterative reconstruction. 
  
Findings: 
CT ABDOMEN:   
Limited evaluation of the lung bases and base of the mediastinum demonstrates 
postsurgical changes consistent with prior median sternotomy. Dense 
atherosclerotic calcification is seen of the coronary arteries of the heart with 
the heart appearing at least mild to moderately enlarged. Nonspecific basilar 
airspace changes are seen which are favored to represent atelectasis. A small 
hiatal hernia is seen. 
  
Assessment of the solid organs is limited by the lack of administered 
intravenous contrast. A subtle abnormality could be missed. The Liver is 
homogeneous in attenuation. Gas is seen within the common bile duct on image 26. Additional gas is seen within the central left lobe of the liver on image 19 
which is favored to represent pneumobilia given this gas in the common bile 
duct. This could be due to a prior procedure and is not clearly abnormal. The 
spleen is homogeneous in attenuation. No contour deforming mass lesions are 
seen of the pancreas or adrenal glands. The gallbladder is not seen.  No 
 hydronephrosis is seen of the kidneys.   
  
The visualized loops of small bowel and colon are normal in caliber. Questionable wall thickening of proximal small bowel loops in the left abdomen 
are seen on image 37 which appear to demonstrate very mild adjacent mesenteric 
edema. No definite acutely inflamed colon is appreciated. The appendix is felt 
to be seen on axial image 47 without acute abnormality. No free air or abnormal 
fluid collection is definitely seen. No adenopathy is seen. The abdominal 
aorta demonstrates moderate atherosclerotic calcification. A severe although 
chronic appearing compression deformity is seen of the T12 vertebral body 
  
CT PELVIS: 
No abnormal pelvic fluid collections or inflammatory changes are present. No 
pelvic adenopathy is seen. The urinary bladder is poorly distended limiting 
assessment. This contains gas although also contains a Clifton catheter with gas 
possibly introduce with Clifton catheter placement. No clear abnormal stranding of 
adjacent pelvic soft tissues is seen to suggest an acute inflammatory process of 
the urinary bladder. A calcified structure is seen in the central pelvis which 
likely represents a fibroid uterus. 
  
IMPRESSION IMPRESSION:   
1. The only potential etiology of sepsis is that there is some wall thickening 
suggested of more proximal small bowel loops in the left abdomen with mild 
adjacent mesenteric stranding which can suggest inflammation (i.e. enteritis). No free air or abnormal fluid collection is definitely seen on this limited 
noncontrast study.  
  
2. Nonspecific basilar airspace changes of the lungs likely representing 
atelectasis given the distribution although pneumonia is not excluded if the 
patient has symptoms of pneumonia. 
  
3. Gas within the urinary bladder although this very well may have been 
introduced with Clifton catheter placement. This would be best further assessed with urinalysis if this has not been recently performed. Medications:  
Current Facility-Administered Medications Medication Dose Route Frequency  0.9% sodium chloride infusion 250 mL  250 mL IntraVENous PRN  
 insulin glargine (LANTUS) injection 30 Units  30 Units SubCUTAneous ONCE  
 [START ON 8/10/2019] insulin glargine (LANTUS) injection 50 Units  50 Units SubCUTAneous QHS  Vancomycin Intermittent therapy-Pharmacy to dose   Other Rx Dosing/Monitoring  sodium chloride (NS) flush 5-10 mL  5-10 mL IntraVENous PRN  
 0.9% sodium chloride infusion 250 mL  250 mL IntraVENous PRN  
 NOREPINephrine (LEVOPHED) 4 mg/250 mL (16 mcg/mL) in NS infusion  2-16 mcg/min IntraVENous TITRATE  sodium chloride (NS) flush 5-40 mL  5-40 mL IntraVENous Q8H  
 sodium chloride (NS) flush 5-40 mL  5-40 mL IntraVENous PRN  
 ondansetron (ZOFRAN) injection 4 mg  4 mg IntraVENous Q4H PRN  
 0.9% sodium chloride infusion  100 mL/hr IntraVENous CONTINUOUS  
 cefTRIAXone (ROCEPHIN) 2 g in 0.9% sodium chloride (MBP/ADV) 50 mL  2 g IntraVENous Q24H  
 metroNIDAZOLE (FLAGYL) IVPB premix 500 mg  500 mg IntraVENous Q12H  pantoprazole (PROTONIX) 40 mg in sodium chloride 0.9% 10 mL injection  40 mg IntraVENous Q24H  
 insulin regular (NOVOLIN R, HUMULIN R) 100 Units in 0.9% sodium chloride 100 mL infusion  0-50 Units/hr IntraVENous TITRATE  dextrose 40% (GLUTOSE) oral gel 1 Tube  15 g Oral PRN  
 glucagon (GLUCAGEN) injection 1 mg  1 mg IntraMUSCular PRN  
 dextrose (D50W) injection syrg 12.5-25 g  25-50 mL IntraVENous PRN Review of Systems: ROS was obtained, with pertinent positives as listed above. No chest pain or SOB. Diet:  NPO Objective:  
Vitals: 
Visit Vitals /76 Pulse 82 Temp 98.9 °F (37.2 °C) Resp 19 Ht 5' 3\" (1.6 m) Wt 65.4 kg (144 lb 2.9 oz) SpO2 97% BMI 25.54 kg/m² Intake/Output: 
No intake/output data recorded. 08/07 1901 - 08/09 0700 In: 2924.4 [I.V.:2235.3] Out: 9287 [Urine:2475] Exam: 
General appearance: alert, cooperative, no distress MORE ALERT; LESS ILL APPEARING. VSS; NURSING AT BEDSIDE Lungs: clear to auscultation bilaterally anteriorly Heart: regular rate and rhythm Abdomen: soft, non-tender. Bowel sounds normal. No masses, no organomegaly Extremities: extremities normal, atraumatic, no cyanosis or edema Neuro:  alert and oriented Data Review (Labs):   
Recent Labs 08/09/19 
0520 08/09/19 
0012 08/08/19 
2146 08/08/19 
1900 08/08/19 
1817 08/08/19 
1228 WBC 21.5*  --   --   --   --  32.7* HGB 7.6* 6.8*  --  7.2*  --  6.8* HCT 23.2* 20.7*  --  22.2*  --  21.4*  
*  --   --   --   --  194 MCV 92.8  --   --   --   --  96.8 * 143 141  --  136 132* K 4.9 4.6 4.9  --  5.5* 5.0  
* 110* 108*  --  104 97* CO2 25 24 23  --  20* 20* * 127* 125*  --  130* 139* CREA 2.19* 2.47* 2.63*  --  2.82* 3.35* CA 7.7* 8.0* 8.0*  --  7.9* 8.5 MG 2.4 2.4 2.5*  --  2.5*  --   
GLU 99 286* 384*  --  518* 550* *  --   --   --   --  180* SGOT 1,883*  --   --   --   --  3,711* ALT 1,636*  --   --   --   --  2,586* TBILI 1.1  --   --   --   --  0.4 ALB 2.2*  --   --   --   --  2.8*  
TP 4.8*  --   --   --   --  5.5* LPSE  --   --   --   --   --  2,516* PTP  --   --   --   --  14.9*  --   
INR  --   --   --   --  1.2  --   
 
 
Assessment:  
 
Principal Problem: 
  Septic shock ([facility] (8/8/2019) Active Problems: 
  Uncontrolled type 2 diabetes mellitus with hyperglycemia ([facility] (6/15/2015) Chronic kidney disease, stage III (moderate) (HCC) (6/15/2015) Acute renal failure (ARF) ([facility] (8/8/2019) Elevated liver function tests (8/8/2019) Anemia (8/8/2019) Right lower quadrant abdominal pain (8/8/2019) 78 y.o. female with PMH of (but not limited to) CAD with history of CABG on Plavix, DM, HTN, HLD, hx of ischemic colitis, diverticular disease, an ampullary stenosis who we are following for septic shock with abnormal LFTS. ERCP on 7/24/19 by Dr. 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 On admission, she had leukocytosis, septic shock, transaminitis and hyperlipasemia. CT of the abdomen/pelvis on 8/8 revealed some mesenteric stranding. She has gram (-) rods on blood culture. She is on Vancomycin, Flagyl and Rocephin. Likely developed post ERCP pancreatitis, but suspect she had another event such as possible portal vein thrombosis that lead to hepatitis. Transaminases are now improving.  
  
Labs 8/9/19:WBC 21.5(32.7), Hgb of 7.6(6.8) (previously 9.9 on 2/4), glucose 99(550), (139),  Creat 2.19(3.35), T bili 1.1 (0.4), ALT 9441(6447), AST 2638(9000), (180),8/8/19 lipase 2516, procalcitonin 9.5. Plan: 1. Obtain abdominal US with doppler of the portal venous system 2. Keep NPO, but may add ice chips 3. Agree with antibiotics 4. Trend labs 5. Continue supportive care Gunnar Zeng. Elis Lemons in collaboration with Dr. Prince Gupta [facility]

## 2019-08-09 NOTE — PROGRESS NOTES
PT Note: 
 
Participated in interdisciplinary rounds in ICU/CCU and chart reviewed. Patient is experiencing decrease in function from baseline. Patient would benefit from skilled acute therapy to increase strength, endurance, and functional mobility. Recommend PT consult when medically stable and MD agrees. Thank you for your consideration, 
Modesta Boone, JULIA Gunn, PT, DPT

## 2019-08-09 NOTE — PROGRESS NOTES
Spdu=525 after D5 added to IVF. Poonam Cheema NP notified and order to switch back to NS as primary IVF.

## 2019-08-09 NOTE — PROGRESS NOTES
Pharmacokinetic Consult to Pharmacist 
 
Unasilvialayton Webb is a 78 y.o. female being treated for bloodstream infection with vancomycin. Height: 5' 3\" (160 cm)  Weight: 65.4 kg (144 lb 2.9 oz) Lab Results Component Value Date/Time  (H) 08/09/2019 12:12 AM  
 Creatinine 2.47 (H) 08/09/2019 12:12 AM  
 WBC 21.5 (H) 08/09/2019 05:20 AM  
 Procalcitonin 9.5 08/08/2019 12:28 PM  
 Lactic acid 2.0 08/09/2019 12:12 AM  
 Lactic Acid (POC) 4.51 (H) 08/08/2019 02:44 PM  
  
Estimated Creatinine Clearance: 16.2 mL/min (A) (based on SCr of 2.47 mg/dL (H)). CULTURES: 
Results Procedure Component Value Units Date/Time CULTURE, URINE [[id number]] Collected:  08/08/19 1252 Order Status:  Completed Specimen:  Urine from Clifton Specimen Updated:  08/08/19 3069 CULTURE, BLOOD [[id number]] Collected:  08/08/19 1232 Order Status:  Completed Specimen:  Blood Updated:  08/09/19 0302 Special Requests: --     
  LEFT 
FOREARM 
  
  GRAM STAIN GRAM POSITIVE COCCI BMO5 RESULTS VERIFIED, PHONED TO AND READ BACK BY FAY LAM @ 0258 8/9/19 MM Culture result:    
  CULTURE IN PROGRESS,FURTHER UPDATES TO FOLLOW CULTURE, BLOOD [[id number]] Collected:  08/08/19 1228 Order Status:  Completed Specimen:  Blood Updated:  08/09/19 0303 Special Requests: --     
  RIGHT Antecubital 
  
  GRAM STAIN GRAM POSITIVE COCCI ANAEROBIC BOTTLE POSITIVE RESULTS VERIFIED, PHONED TO AND READ BACK BY FAY LAM @ 0258 8/9/19 MM Culture result:    
  CULTURE IN PROGRESS,FURTHER UPDATES TO FOLLOW Day 1 of vancomycin. Goal trough is 15-20. Vancomycin dose initiated at 1,750 mg load, followed by intermittent therapy due to renal function. Will continue to follow patient. Thank you, Tito Souza, PharmD

## 2019-08-09 NOTE — PROGRESS NOTES
Initial visit was made, prayer, emotional support and a spiritual presence were provided for the patient, her daughter and grandson.  card was left with the patient. Domenic Moffett

## 2019-08-09 NOTE — PROGRESS NOTES
Critical Care Daily Progress Note: 8/9/2019 Admission Date: 8/8/2019 The patient's chart is reviewed and the patient is discussed with the staff. Rosemary Roth is an 78yoF with PMH of CABG, HTN, HLD, Stage III CKD, s/p cholecystectomy, GERD who underwent ERCP on  7/24 for dilated CBD with only small stone and no clear etiology identified. She presented to ER 2 weeks later with 2 days of acute symptoms of abd pain, anorexia, nausea progressing to RLQ pain which was severe. Upon presentation she was hypotensive with elevated lipase >2500, lactic acidosis, PRATIMA, transaminitis with concern for ischemia/shock liver vs cholangitis. She now has evidence of gram negative bacteremia. Noncontrast abd scan suggestive of gas in CBD/liver (possible just post-ERCP) and possible enteritis. There is also concern about possible portal vein thrombosis as sequelae of pancreatitis. Subjective:  
Gram negative bacteremia suggestive of E. Coli & enterococcus. +450 mo total yesterday with 2.5L urine output, dreatinine down from 2.8 to 2.2 Now off levophed, with improved lactic acidosis down to 2.0 Patient is awake, alert, asking to eat/drink. Says she is thirsty. FSBS in low 100s, and npo except ice chips. Ordered for lantus and ssi. Current Facility-Administered Medications Medication Dose Route Frequency  0.9% sodium chloride infusion 250 mL  250 mL IntraVENous PRN  
 insulin glargine (LANTUS) injection 30 Units  30 Units SubCUTAneous ONCE  
 [START ON 8/10/2019] insulin glargine (LANTUS) injection 50 Units  50 Units SubCUTAneous QHS  Vancomycin Intermittent therapy-Pharmacy to dose   Other Rx Dosing/Monitoring  sodium chloride (NS) flush 5-10 mL  5-10 mL IntraVENous PRN  
 0.9% sodium chloride infusion 250 mL  250 mL IntraVENous PRN  
 NOREPINephrine (LEVOPHED) 4 mg/250 mL (16 mcg/mL) in NS infusion  2-16 mcg/min IntraVENous TITRATE  sodium chloride (NS) flush 5-40 mL  5-40 mL IntraVENous Q8H  
 sodium chloride (NS) flush 5-40 mL  5-40 mL IntraVENous PRN  
 ondansetron (ZOFRAN) injection 4 mg  4 mg IntraVENous Q4H PRN  
 0.9% sodium chloride infusion  100 mL/hr IntraVENous CONTINUOUS  
 cefTRIAXone (ROCEPHIN) 2 g in 0.9% sodium chloride (MBP/ADV) 50 mL  2 g IntraVENous Q24H  
 metroNIDAZOLE (FLAGYL) IVPB premix 500 mg  500 mg IntraVENous Q12H  pantoprazole (PROTONIX) 40 mg in sodium chloride 0.9% 10 mL injection  40 mg IntraVENous Q24H  
 insulin regular (NOVOLIN R, HUMULIN R) 100 Units in 0.9% sodium chloride 100 mL infusion  0-50 Units/hr IntraVENous TITRATE  dextrose 40% (GLUTOSE) oral gel 1 Tube  15 g Oral PRN  
 glucagon (GLUCAGEN) injection 1 mg  1 mg IntraMUSCular PRN  
 dextrose (D50W) injection syrg 12.5-25 g  25-50 mL IntraVENous PRN Review of Systems Constitutional:  negative for fever, chills, sweats Cardiovascular:  negative for chest pain, palpitations, syncope, edema Gastrointestinal:  negative for dysphagia, reflux, vomiting, diarrhea, abdominal pain, or melena Neurologic:  negative for focal weakness, numbness, headache Objective:  
 
Vitals:  
 08/09/19 1354 08/09/19 7268 08/09/19 3819 08/09/19 5257 BP: 115/58 116/58 113/57 107/76 Pulse: 81 81 80 82 Resp: 22 21 22 19 Temp:  98.9 °F (37.2 °C) SpO2: 97% 98% 95% 97% Weight:      
Height:      
 
 
 
Intake/Output Summary (Last 24 hours) at 8/9/2019 3491 Last data filed at 8/9/2019 7538 Gross per 24 hour Intake 2924.43 ml Output 2475 ml Net 449.43 ml Physical Exam:         
Constitutional:  intubated and mechanically ventilated. EENMT:  Sclera clear, pupils equal, oral mucosa moist 
Respiratory: CTA Cardiovascular:  RRR with no M,G,R; 
Gastrointestinal:  mild tenderness in abd, no distention, hypoactive bowel sounds Musculoskeletal:  warm with no cyanosis, no lower extremity edema Skin:  no jaundice or ecchymosis Neurologic: no gross neuro deficits Psychiatric:  alert and oriented x 3 LINES:   
CVC 8/8/19 on left Clifton DRIPS:   
Normal saline @100/hr CXR:   
 
 
CT abd/pelvis: 1. The only potential etiology of sepsis is that there is some wall thickening 
suggested of more proximal small bowel loops in the left abdomen with mild 
adjacent mesenteric stranding which can suggest inflammation (i.e. enteritis). No free air or abnormal fluid collection is definitely seen on this limited 
noncontrast study. 2. Nonspecific basilar airspace changes of the lungs likely representing 
atelectasis given the distribution although pneumonia is not excluded if the 
patient has symptoms of pneumonia. 3. Gas within the urinary bladder although this very well may have been 
introduced with Clifton catheter placement. This would be best further assessed 
with urinalysis if this has not been recently performed. ABG: No results for input(s): PH, PCO2, PO2, HCO3, PHI, PCO2I, PO2I, HCO3I in the last 72 hours. LAB Recent Labs 08/09/19 
2674 08/09/19 
7170 08/09/19 
0413 08/09/19 
0303 08/09/19 
9108 GLUCPOC 102* 108* 121* 170* 211* Recent Labs 08/09/19 
0520 08/09/19 
0012 08/08/19 
1900 08/08/19 
1817 08/08/19 
1228 WBC 21.5*  --   --   --  32.7* HGB 7.6* 6.8* 7.2*  --  6.8* HCT 23.2* 20.7* 22.2*  --  21.4*  
*  --   --   --  194 INR  --   --   --  1.2  --   
 
Recent Labs 08/09/19 
1213 08/09/19 
0012 08/08/19 
2146 08/08/19 
1817  08/08/19 
1228 * 143 141 136  --  132* K 4.9 4.6 4.9 5.5*  --  5.0  
* 110* 108* 104  --  97* CO2 25 24 23 20*  --  20* GLU 99 286* 384* 518*  --  550* * 127* 125* 130*  --  139* CREA 2.19* 2.47* 2.63* 2.82*  --  3.35* MG 2.4 2.4 2.5* 2.5*   < >  --   
PHOS 2.4  --   --  4.7*  --   --   
CA 7.7* 8.0* 8.0* 7.9*  --  8.5 ALB 2.2*  --   --   --   --  2.8* SGOT 1,883*  --   --   --   --  4,230*  
 < > = values in this interval not displayed. Recent Labs 08/09/19 
0012 08/08/19 
1817 LAC 2.0 3.2* Assessment:  (Medical Decision Making) Hospital Problems  Date Reviewed: 1/30/2019 Codes Class Noted POA * (Principal) Septic shock ([facility] ICD-10-CM: A41.9, R65.21 ICD-9-CM: 038.9, 785.52, 995.92  8/8/2019 Yes Off vasopressors. Acute renal failure (ARF) (HCC) ICD-10-CM: N17.9 ICD-9-CM: 584.9  8/8/2019 Yes Improving renal function Elevated liver function tests ICD-10-CM: R94.5 ICD-9-CM: 790.6  8/8/2019 Yes Thought ischemic from septic shock though cholangitis not ruled out entirely Anemia ICD-10-CM: D64.9 ICD-9-CM: 285.9  8/8/2019 Yes Stable this am.  FOBT pending. Right lower quadrant abdominal pain ICD-10-CM: R10.31 ICD-9-CM: 789.03  8/8/2019 Unknown Improved. Uncontrolled type 2 diabetes mellitus with hyperglycemia (HCC) (Chronic) ICD-10-CM: E11.65 ICD-9-CM: 250.02  6/15/2015 Yes Adding D5 with ongoing lantus Chronic kidney disease, stage III (moderate) (HCC) (Chronic) ICD-10-CM: N18.3 ICD-9-CM: 585.3  6/15/2015 Yes Gram negative bacteremia:  Below. Concern for cholangitis vs portal vein thrombosis. Source of bacteremia is not absolutely clear. Otherwise treating pancreatitis, septic shock, PRATIMA, shock liver. Plan:  (Medical Decision Making) -- continue IV fluids and ice chips. Monitor FSBS closely, added D5NS to avoid hypoglycemia 
-- f/u ultrasound of abdomen with Doppler and with GI regarding any plans for procedures today. -- monitor urine output closely -- trend LFTs and monitor renal function 
-- f/u blood culture ID/susceptibility to confirm antibiotics are sufficient. Consider stopping Flagyl tomorrow. -- f/u FOBT, f/u H/H tomorrow. -- Full code,  Patient reports she would never want dialysis.  
 
More than 50% of the time documented was spent in face-to-face contact with the patient and in the care of the patient on the floor/unit where the patient is located.  
 
Colleen Mckeon MD

## 2019-08-10 ENCOUNTER — ANESTHESIA (OUTPATIENT)
Dept: ENDOSCOPY | Age: 79
DRG: 871 | End: 2019-08-10
Payer: MEDICARE

## 2019-08-10 ENCOUNTER — ANESTHESIA EVENT (OUTPATIENT)
Dept: ENDOSCOPY | Age: 79
DRG: 871 | End: 2019-08-10
Payer: MEDICARE

## 2019-08-10 ENCOUNTER — APPOINTMENT (OUTPATIENT)
Dept: GENERAL RADIOLOGY | Age: 79
DRG: 871 | End: 2019-08-10
Attending: INTERNAL MEDICINE
Payer: MEDICARE

## 2019-08-10 LAB
ALBUMIN SERPL-MCNC: 2.1 G/DL (ref 3.2–4.6)
ALBUMIN/GLOB SERPL: 0.8 {RATIO} (ref 1.2–3.5)
ALP SERPL-CCNC: 181 U/L (ref 50–136)
ALT SERPL-CCNC: 986 U/L (ref 12–65)
ANION GAP SERPL CALC-SCNC: 7 MMOL/L (ref 7–16)
AST SERPL-CCNC: 495 U/L (ref 15–37)
BILIRUB SERPL-MCNC: 0.4 MG/DL (ref 0.2–1.1)
BUN SERPL-MCNC: 86 MG/DL (ref 8–23)
CALCIUM SERPL-MCNC: 8 MG/DL (ref 8.3–10.4)
CHLORIDE SERPL-SCNC: 116 MMOL/L (ref 98–107)
CO2 SERPL-SCNC: 23 MMOL/L (ref 21–32)
CREAT SERPL-MCNC: 1.95 MG/DL (ref 0.6–1)
ERYTHROCYTE [DISTWIDTH] IN BLOOD BY AUTOMATED COUNT: 14.6 % (ref 11.9–14.6)
GLOBULIN SER CALC-MCNC: 2.5 G/DL (ref 2.3–3.5)
GLUCOSE BLD STRIP.AUTO-MCNC: 220 MG/DL (ref 65–100)
GLUCOSE BLD STRIP.AUTO-MCNC: 230 MG/DL (ref 65–100)
GLUCOSE BLD STRIP.AUTO-MCNC: 258 MG/DL (ref 65–100)
GLUCOSE BLD STRIP.AUTO-MCNC: 376 MG/DL (ref 65–100)
GLUCOSE BLD STRIP.AUTO-MCNC: 425 MG/DL (ref 65–100)
GLUCOSE SERPL-MCNC: 435 MG/DL (ref 65–100)
HAV IGM SERPL QL IA: NEGATIVE
HBV CORE IGM SERPL QL IA: NEGATIVE
HBV SURFACE AG SERPL QL IA: NEGATIVE
HCT VFR BLD AUTO: 19.1 % (ref 35.8–46.3)
HCT VFR BLD AUTO: 28.9 % (ref 35.8–46.3)
HCV AB S/CO SERPL IA: <0.1 S/CO RATIO (ref 0–0.9)
HGB BLD-MCNC: 6.4 G/DL (ref 11.7–15.4)
HGB BLD-MCNC: 9.4 G/DL (ref 11.7–15.4)
INR PPP: 1.2
MCH RBC QN AUTO: 31.2 PG (ref 26.1–32.9)
MCHC RBC AUTO-ENTMCNC: 33.5 G/DL (ref 31.4–35)
MCV RBC AUTO: 93.2 FL (ref 79.6–97.8)
NRBC # BLD: 0.02 K/UL (ref 0–0.2)
PLATELET # BLD AUTO: 123 K/UL (ref 150–450)
PMV BLD AUTO: 12.6 FL (ref 9.4–12.3)
POTASSIUM SERPL-SCNC: 4.5 MMOL/L (ref 3.5–5.1)
PROT SERPL-MCNC: 4.6 G/DL (ref 6.3–8.2)
PROTHROMBIN TIME: 15.4 SEC (ref 11.7–14.5)
RBC # BLD AUTO: 2.05 M/UL (ref 4.05–5.2)
SODIUM SERPL-SCNC: 146 MMOL/L (ref 136–145)
VANCOMYCIN SERPL-MCNC: 11.1 UG/ML
WBC # BLD AUTO: 15.2 K/UL (ref 4.3–11.1)

## 2019-08-10 PROCEDURE — C2625 STENT, NON-COR, TEM W/DEL SY: HCPCS | Performed by: INTERNAL MEDICINE

## 2019-08-10 PROCEDURE — 77030009038 HC CATH BILI STN RTVR BSC -C: Performed by: INTERNAL MEDICINE

## 2019-08-10 PROCEDURE — 74011636637 HC RX REV CODE- 636/637: Performed by: INTERNAL MEDICINE

## 2019-08-10 PROCEDURE — 99233 SBSQ HOSP IP/OBS HIGH 50: CPT | Performed by: INTERNAL MEDICINE

## 2019-08-10 PROCEDURE — 82272 OCCULT BLD FECES 1-3 TESTS: CPT

## 2019-08-10 PROCEDURE — 74011250636 HC RX REV CODE- 250/636

## 2019-08-10 PROCEDURE — 76040000027: Performed by: INTERNAL MEDICINE

## 2019-08-10 PROCEDURE — 76060000033 HC ANESTHESIA 1 TO 1.5 HR: Performed by: INTERNAL MEDICINE

## 2019-08-10 PROCEDURE — 0F798DZ DILATION OF COMMON BILE DUCT WITH INTRALUMINAL DEVICE, VIA NATURAL OR ARTIFICIAL OPENING ENDOSCOPIC: ICD-10-PCS | Performed by: INTERNAL MEDICINE

## 2019-08-10 PROCEDURE — 77030039425 HC BLD LARYNG TRULITE DISP TELE -A: Performed by: ANESTHESIOLOGY

## 2019-08-10 PROCEDURE — 74011636320 HC RX REV CODE- 636/320: Performed by: INTERNAL MEDICINE

## 2019-08-10 PROCEDURE — 77030039270 HC TU BLD FLTR CARD -A

## 2019-08-10 PROCEDURE — 82962 GLUCOSE BLOOD TEST: CPT

## 2019-08-10 PROCEDURE — 77030007288 HC DEV LOK BILI BSC -A: Performed by: INTERNAL MEDICINE

## 2019-08-10 PROCEDURE — 74011000258 HC RX REV CODE- 258: Performed by: INTERNAL MEDICINE

## 2019-08-10 PROCEDURE — 77030012595 HC SPHNTOM BILI BSC -D: Performed by: INTERNAL MEDICINE

## 2019-08-10 PROCEDURE — 85610 PROTHROMBIN TIME: CPT

## 2019-08-10 PROCEDURE — 80053 COMPREHEN METABOLIC PANEL: CPT

## 2019-08-10 PROCEDURE — 36592 COLLECT BLOOD FROM PICC: CPT

## 2019-08-10 PROCEDURE — 85027 COMPLETE CBC AUTOMATED: CPT

## 2019-08-10 PROCEDURE — 74011250636 HC RX REV CODE- 250/636: Performed by: INTERNAL MEDICINE

## 2019-08-10 PROCEDURE — 36430 TRANSFUSION BLD/BLD COMPNT: CPT

## 2019-08-10 PROCEDURE — 74330 X-RAY BILE/PANC ENDOSCOPY: CPT

## 2019-08-10 PROCEDURE — 74011000250 HC RX REV CODE- 250

## 2019-08-10 PROCEDURE — 77030037088 HC TUBE ENDOTRACH ORAL NSL COVD-A: Performed by: ANESTHESIOLOGY

## 2019-08-10 PROCEDURE — BF101ZZ FLUOROSCOPY OF BILE DUCTS USING LOW OSMOLAR CONTRAST: ICD-10-PCS | Performed by: INTERNAL MEDICINE

## 2019-08-10 PROCEDURE — 77030032490 HC SLV COMPR SCD KNE COVD -B: Performed by: INTERNAL MEDICINE

## 2019-08-10 PROCEDURE — 80202 ASSAY OF VANCOMYCIN: CPT

## 2019-08-10 PROCEDURE — 85018 HEMOGLOBIN: CPT

## 2019-08-10 PROCEDURE — 77030020245 HC SOL INJ 5% D/0.9%NACL

## 2019-08-10 PROCEDURE — P9016 RBC LEUKOCYTES REDUCED: HCPCS

## 2019-08-10 PROCEDURE — 65610000001 HC ROOM ICU GENERAL

## 2019-08-10 PROCEDURE — 74011000250 HC RX REV CODE- 250: Performed by: INTERNAL MEDICINE

## 2019-08-10 PROCEDURE — C9113 INJ PANTOPRAZOLE SODIUM, VIA: HCPCS | Performed by: INTERNAL MEDICINE

## 2019-08-10 DEVICE — BILIARY STENT WITH NAVIFLEXTM RX DELIVERY SYSTEM
Type: IMPLANTABLE DEVICE | Site: BILE DUCT | Status: FUNCTIONAL
Brand: ADVANIX™ BILIARY

## 2019-08-10 RX ORDER — NEOSTIGMINE METHYLSULFATE 1 MG/ML
INJECTION INTRAVENOUS AS NEEDED
Status: DISCONTINUED | OUTPATIENT
Start: 2019-08-10 | End: 2019-08-10 | Stop reason: HOSPADM

## 2019-08-10 RX ORDER — SODIUM CHLORIDE 0.9 % (FLUSH) 0.9 %
5-40 SYRINGE (ML) INJECTION AS NEEDED
Status: DISCONTINUED | OUTPATIENT
Start: 2019-08-10 | End: 2019-08-10

## 2019-08-10 RX ORDER — PROPOFOL 10 MG/ML
INJECTION, EMULSION INTRAVENOUS AS NEEDED
Status: DISCONTINUED | OUTPATIENT
Start: 2019-08-10 | End: 2019-08-10 | Stop reason: HOSPADM

## 2019-08-10 RX ORDER — SODIUM CHLORIDE, SODIUM LACTATE, POTASSIUM CHLORIDE, CALCIUM CHLORIDE 600; 310; 30; 20 MG/100ML; MG/100ML; MG/100ML; MG/100ML
75 INJECTION, SOLUTION INTRAVENOUS CONTINUOUS
Status: DISCONTINUED | OUTPATIENT
Start: 2019-08-10 | End: 2019-08-10 | Stop reason: HOSPADM

## 2019-08-10 RX ORDER — DEXTROSE 50 % IN WATER (D50W) INTRAVENOUS SYRINGE
25-50 AS NEEDED
Status: DISCONTINUED | OUTPATIENT
Start: 2019-08-10 | End: 2019-08-10

## 2019-08-10 RX ORDER — SODIUM CHLORIDE 9 MG/ML
250 INJECTION, SOLUTION INTRAVENOUS AS NEEDED
Status: DISCONTINUED | OUTPATIENT
Start: 2019-08-10 | End: 2019-08-16 | Stop reason: HOSPADM

## 2019-08-10 RX ORDER — ONDANSETRON 2 MG/ML
INJECTION INTRAMUSCULAR; INTRAVENOUS AS NEEDED
Status: DISCONTINUED | OUTPATIENT
Start: 2019-08-10 | End: 2019-08-10 | Stop reason: HOSPADM

## 2019-08-10 RX ORDER — GLYCOPYRROLATE 0.2 MG/ML
INJECTION INTRAMUSCULAR; INTRAVENOUS AS NEEDED
Status: DISCONTINUED | OUTPATIENT
Start: 2019-08-10 | End: 2019-08-10 | Stop reason: HOSPADM

## 2019-08-10 RX ORDER — INSULIN LISPRO 100 [IU]/ML
INJECTION, SOLUTION INTRAVENOUS; SUBCUTANEOUS
Status: DISCONTINUED | OUTPATIENT
Start: 2019-08-10 | End: 2019-08-16 | Stop reason: HOSPADM

## 2019-08-10 RX ORDER — SODIUM CHLORIDE 9 MG/ML
100 INJECTION, SOLUTION INTRAVENOUS CONTINUOUS
Status: DISCONTINUED | OUTPATIENT
Start: 2019-08-10 | End: 2019-08-11

## 2019-08-10 RX ORDER — FENTANYL CITRATE 50 UG/ML
INJECTION, SOLUTION INTRAMUSCULAR; INTRAVENOUS AS NEEDED
Status: DISCONTINUED | OUTPATIENT
Start: 2019-08-10 | End: 2019-08-10 | Stop reason: HOSPADM

## 2019-08-10 RX ORDER — LIDOCAINE HYDROCHLORIDE 20 MG/ML
INJECTION, SOLUTION EPIDURAL; INFILTRATION; INTRACAUDAL; PERINEURAL AS NEEDED
Status: DISCONTINUED | OUTPATIENT
Start: 2019-08-10 | End: 2019-08-10 | Stop reason: HOSPADM

## 2019-08-10 RX ORDER — LORAZEPAM 1 MG/1
1 TABLET ORAL
Status: DISCONTINUED | OUTPATIENT
Start: 2019-08-10 | End: 2019-08-10

## 2019-08-10 RX ORDER — ROCURONIUM BROMIDE 10 MG/ML
INJECTION, SOLUTION INTRAVENOUS AS NEEDED
Status: DISCONTINUED | OUTPATIENT
Start: 2019-08-10 | End: 2019-08-10 | Stop reason: HOSPADM

## 2019-08-10 RX ADMIN — INSULIN LISPRO 9 UNITS: 100 INJECTION, SOLUTION INTRAVENOUS; SUBCUTANEOUS at 17:24

## 2019-08-10 RX ADMIN — Medication 40 ML: at 05:02

## 2019-08-10 RX ADMIN — ONDANSETRON 4 MG: 2 INJECTION INTRAMUSCULAR; INTRAVENOUS at 12:45

## 2019-08-10 RX ADMIN — FENTANYL CITRATE 50 MCG: 50 INJECTION, SOLUTION INTRAMUSCULAR; INTRAVENOUS at 12:07

## 2019-08-10 RX ADMIN — LIDOCAINE HYDROCHLORIDE 100 MG: 20 INJECTION, SOLUTION EPIDURAL; INFILTRATION; INTRACAUDAL; PERINEURAL at 12:07

## 2019-08-10 RX ADMIN — METRONIDAZOLE 500 MG: 500 INJECTION, SOLUTION INTRAVENOUS at 05:01

## 2019-08-10 RX ADMIN — INSULIN LISPRO 6 UNITS: 100 INJECTION, SOLUTION INTRAVENOUS; SUBCUTANEOUS at 22:59

## 2019-08-10 RX ADMIN — Medication 10 ML: at 14:50

## 2019-08-10 RX ADMIN — SODIUM CHLORIDE, SODIUM LACTATE, POTASSIUM CHLORIDE, AND CALCIUM CHLORIDE: 600; 310; 30; 20 INJECTION, SOLUTION INTRAVENOUS at 11:45

## 2019-08-10 RX ADMIN — SODIUM CHLORIDE 100 ML/HR: 900 INJECTION, SOLUTION INTRAVENOUS at 14:11

## 2019-08-10 RX ADMIN — IOPAMIDOL 12 ML: 755 INJECTION, SOLUTION INTRAVENOUS at 12:52

## 2019-08-10 RX ADMIN — PROPOFOL 100 MG: 10 INJECTION, EMULSION INTRAVENOUS at 12:07

## 2019-08-10 RX ADMIN — INSULIN GLARGINE 50 UNITS: 100 INJECTION, SOLUTION SUBCUTANEOUS at 22:59

## 2019-08-10 RX ADMIN — CEFTRIAXONE SODIUM 2 G: 2 INJECTION, POWDER, FOR SOLUTION INTRAMUSCULAR; INTRAVENOUS at 14:20

## 2019-08-10 RX ADMIN — NEOSTIGMINE METHYLSULFATE 1 MG: 1 INJECTION INTRAVENOUS at 12:53

## 2019-08-10 RX ADMIN — VANCOMYCIN HYDROCHLORIDE 1000 MG: 1 INJECTION, POWDER, LYOPHILIZED, FOR SOLUTION INTRAVENOUS at 05:01

## 2019-08-10 RX ADMIN — GLYCOPYRROLATE 0.4 MG: 0.2 INJECTION INTRAMUSCULAR; INTRAVENOUS at 12:50

## 2019-08-10 RX ADMIN — INSULIN LISPRO 6 UNITS: 100 INJECTION, SOLUTION INTRAVENOUS; SUBCUTANEOUS at 14:22

## 2019-08-10 RX ADMIN — INSULIN LISPRO 18 UNITS: 100 INJECTION, SOLUTION INTRAVENOUS; SUBCUTANEOUS at 08:57

## 2019-08-10 RX ADMIN — ROCURONIUM BROMIDE 30 MG: 10 INJECTION, SOLUTION INTRAVENOUS at 12:08

## 2019-08-10 RX ADMIN — Medication 10 ML: at 23:06

## 2019-08-10 RX ADMIN — NEOSTIGMINE METHYLSULFATE 3 MG: 1 INJECTION INTRAVENOUS at 12:50

## 2019-08-10 RX ADMIN — METRONIDAZOLE 500 MG: 500 INJECTION, SOLUTION INTRAVENOUS at 14:20

## 2019-08-10 RX ADMIN — SODIUM CHLORIDE 40 MG: 9 INJECTION, SOLUTION INTRAMUSCULAR; INTRAVENOUS; SUBCUTANEOUS at 14:20

## 2019-08-10 NOTE — PROGRESS NOTES
Dr. Lorraine Rhodes notified of . Orders received to give 15 units Humalog per sliding scale for BGL > 350.

## 2019-08-10 NOTE — PROGRESS NOTES
Pharmacokinetic Consult to Pharmacist 
 
Modi Nader is a 78 y.o. female being treated for BSI with vancomycin and ceftriaxone. Height: 5' 3\" (160 cm)  Weight: 66.4 kg (146 lb 6.2 oz) Lab Results Component Value Date/Time BUN 86 (H) 08/10/2019 03:41 AM  
 Creatinine 1.95 (H) 08/10/2019 03:41 AM  
 WBC 15.2 (H) 08/10/2019 03:41 AM  
 Procalcitonin 9.5 08/08/2019 12:28 PM  
 Lactic acid 2.0 08/09/2019 12:12 AM  
 Lactic Acid (POC) 4.51 (H) 08/08/2019 02:44 PM  
  
Estimated Creatinine Clearance: 20.7 mL/min (A) (based on SCr of 1.95 mg/dL (H)). Lab Results Component Value Date/Time Vancomycin, random 11.1 08/10/2019 03:41 AM  
 
 
Day 2 of vancomycin. Goal trough is 15-20. Change to 1g q24h with Scr trending down. Will continue to follow patient. Thank you, Jone Schaumann, Pharm. D. Clinical Pharmacist 
[phone]

## 2019-08-10 NOTE — PROCEDURES
PROCEDURE: Endoscopic Retrograde Cholangiopancreatogram 
 
DATE of PROCEDURE: 8/10/2019 INDICATION: 
78year old woman with history of ERCP 7/24/19 with stone extraction presents with abdominal pain, nausea, and septic shock with E.Coli and Enterococcus bacteremia, no other source of infection identified with workup, presumed cholangitis. Patient is also on Plavix, last dose 2 days ago. POSTPROCEDURE DIAGNOSIS: 
1. Possible cholangitis MEDICATIONS ADMINISTERED:  GETA INSTRUMENT: [id number] PROCEDURE:  After obtaining informed consent, the patient was placed in prone position on the fluoroscopy table. The patient was then sedated. The endoscope was passed under indirect technique to the oropharynx and gently passed into the esophagus. The endoscope was passed to the ampulla. Limited views of the stomach and duodenum were obtained. After the procedure, the patient was taken to the recovery area in stable condition. Ampulla: The ampulla was located in the expected position and was edematous and bulging into the second portion of the duodenum. There was evidence of prior sphincterotomy with no active bleeding. Cannulation: Cannulation performed on first attempt with the short nose traction sphincterotome preloaded with a 0.035 inch guidewire which was used for deep cannulation of the biliary tree. Contrast was injected resulting in complete opacification. Cholangiogram: 
Initial cholangiogram demonstrated a dilated CBD with apparent stenosis in the distal CBD, likely from the edematous ampulla. After cannulation, when the tome was lifted, thick black bile with small wisps of white pus were seen. Interventions: The CBD was swept multiple times with the 12 mm biliary extraction balloon, producing a thick black bile/sludge but no stones. There was no evidence of contrast extravasation.  The intrahepatic biliary tree appeared normal. Final sweeps were clear and produced clear green bile. The bile duct was observed under fluoroscopy, and contrast did not appear to drain. There appeared to be a smooth cutoff in the distal CBD as seen on image 5, likely from the edematous ampulla. Next, one 10 Fr x 9 cm plastic biliary stent (7 cm not available) was placed in the common bile duct. Green bile was seen flowing through the stent. The stent was placed in good position. Estimated Blood Loss: 0 cc-min ASSESSMENT: 
1. Possible Cholangitis 2. Ampullary stenosis and edema 3. Placement of 10 Fr biliary stent PLAN: 
1. Follow up with inpatient team  
2. Follow LFTs 3. Continue antibiotic therapy, follow microbiology culture data 4. If she declines clinically, consider urinary source as she has history of ESBL E.coli in the urine 5. Repeat ERCP in 4-6 weeks for stent management Vinnie Donohue MD

## 2019-08-10 NOTE — ANESTHESIA PREPROCEDURE EVALUATION
Anesthetic History No history of anesthetic complications Review of Systems / Medical History Pertinent labs reviewed Pulmonary Within defined limits Neuro/Psych Within defined limits Comments: Neuropathy Encephalopathy- A & O x 3. Cardiovascular Hypertension CAD, cardiac stents and CABG Exercise tolerance: <4 METS Comments: Essentially normal echo 2017 GI/Hepatic/Renal 
  
 
 
Renal disease: CRI and ARF Endo/Other Diabetes: poorly controlled, using insulin Anemia Other Findings Comments: Admitted with sepsis Now off norepi drip Physical Exam 
 
Airway Mallampati: II 
TM Distance: 4 - 6 cm Neck ROM: normal range of motion Mouth opening: Normal 
 
 Cardiovascular Regular rate and rhythm,  S1 and S2 normal,  no murmur, click, rub, or gallop Dental 
No notable dental hx Pulmonary Breath sounds clear to auscultation Abdominal 
GI exam deferred Other Findings Anesthetic Plan ASA: 4 Anesthesia type: general 
 
 
 
 
Induction: Intravenous Anesthetic plan and risks discussed with: Patient

## 2019-08-10 NOTE — PROGRESS NOTES
Report received from [facility]. Pt sleeping, eyes open to voice but drowsy. Oriented x4, follows commands and responds appropriately to questions. SpO2 100% on 2L NC. SR on monitor with HR 80s. BP within range. Abdomen semi-soft, nontender with hypoactive bowel sounds. Clifton draining clear, yellow urine. Denies pain or other needs at this time. To continue to monitor.

## 2019-08-10 NOTE — PROGRESS NOTES
Dr. Booker Border notified of Hgb 6.4 and updated on pt condition. Orders received to transfuse 2 units PRBCs.

## 2019-08-10 NOTE — PROGRESS NOTES
[facility] Progress Note Admit Date:  8/8/2019 Today's Date:  8/10/2019 CC:  Septic shock; abnormal LFTS Subjective:  
 
Patient: Denies any abdominal pain, N&V, No melena/hematochezia. No  
BM. Duplex complete US: 
INDICATION: Sepsis. 
  
TECHNIQUE: Grayscale, color, and Doppler interrogation performed. 
  
COMPARISON: None. 
  
Left portal vein:  Patent cm/sec. Main portal Vein:  Patent cm/sec. Right portal Vein:  Patent cm/sec. 
  
IMPRESSION IMPRESSION:  Portal vein is patent. Zak Moctezuma Medications:  
Current Facility-Administered Medications Medication Dose Route Frequency  0.9% sodium chloride infusion 250 mL  250 mL IntraVENous PRN  
 0.9% sodium chloride infusion 250 mL  250 mL IntraVENous PRN  
 insulin glargine (LANTUS) injection 50 Units  50 Units SubCUTAneous QHS  Vancomycin Intermittent therapy-Pharmacy to dose   Other Rx Dosing/Monitoring  tuberculin injection 5 Units  5 Units IntraDERMal ONCE  
 dextrose 5% and 0.9% NaCl infusion  125 mL/hr IntraVENous CONTINUOUS  
 insulin lispro (HUMALOG) injection   SubCUTAneous AC&HS  sodium chloride (NS) flush 5-10 mL  5-10 mL IntraVENous PRN  
 0.9% sodium chloride infusion 250 mL  250 mL IntraVENous PRN  
 NOREPINephrine (LEVOPHED) 4 mg/250 mL (16 mcg/mL) in NS infusion  2-16 mcg/min IntraVENous TITRATE  sodium chloride (NS) flush 5-40 mL  5-40 mL IntraVENous Q8H  
 sodium chloride (NS) flush 5-40 mL  5-40 mL IntraVENous PRN  
 ondansetron (ZOFRAN) injection 4 mg  4 mg IntraVENous Q4H PRN  
 cefTRIAXone (ROCEPHIN) 2 g in 0.9% sodium chloride (MBP/ADV) 50 mL  2 g IntraVENous Q24H  
 metroNIDAZOLE (FLAGYL) IVPB premix 500 mg  500 mg IntraVENous Q12H  pantoprazole (PROTONIX) 40 mg in sodium chloride 0.9% 10 mL injection  40 mg IntraVENous Q24H  
 dextrose 40% (GLUTOSE) oral gel 1 Tube  15 g Oral PRN  
 glucagon (GLUCAGEN) injection 1 mg  1 mg IntraMUSCular PRN  
  dextrose (D50W) injection syrg 12.5-25 g  25-50 mL IntraVENous PRN Review of Systems: ROS was obtained, with pertinent positives as listed above. No chest pain or SOB. Diet:  NPO Objective:  
Vitals: 
Visit Vitals /54 Pulse 75 Temp 97.6 °F (36.4 °C) Resp 22 Ht 5' 3\" (1.6 m) Wt 66.4 kg (146 lb 6.2 oz) SpO2 98% BMI 25.93 kg/m² Intake/Output: 
No intake/output data recorded. 08/08 1901 - 08/10 0700 In: 4532.5 [P.O.:360; I.V.:3822.5] Out: 5925 [[id number]] Exam: 
General appearance: alert, cooperative, no distress ALERT; DR. Cam Birch Lungs: clear to auscultation bilaterally anteriorly Heart: regular rate and rhythm Abdomen: soft, non-tender. Bowel sounds normal. No masses, no organomegaly Extremities: extremities normal, atraumatic, no cyanosis or edema Neuro:  alert and oriented Data Review (Labs):   
Recent Labs 08/10/19 
0341 08/09/19 
0520 08/09/19 
0012 08/08/19 
2146 08/08/19 
1900 08/08/19 
1817 08/08/19 
1228 WBC 15.2* 21.5*  --   --   --   --  32.7* HGB 6.4* 7.6* 6.8*  --  7.2*  --  6.8* HCT 19.1* 23.2* 20.7*  --  22.2*  --  21.4*  
* 123*  --   --   --   --  194 MCV 93.2 92.8  --   --   --   --  96.8 * 146* 143 141  --  136 132* K 4.5 4.9 4.6 4.9  --  5.5* 5.0  
* 115* 110* 108*  --  104 97* CO2 23 25 24 23  --  20* 20* BUN 86* 117* 127* 125*  --  130* 139* CREA 1.95* 2.19* 2.47* 2.63*  --  2.82* 3.35* CA 8.0* 7.7* 8.0* 8.0*  --  7.9* 8.5 MG  --  2.4 2.4 2.5*  --  2.5*  --   
* 99 286* 384*  --  518* 550* * 165*  --   --   --   --  180* SGOT 495* [id number]*  --   --   --   --  1,654* * [id number]*  --   --   --   --  2,586* TBILI 0.4 1.1  --   --   --   --  0.4 ALB 2.1* 2.2*  --   --   --   --  2.8*  
TP 4.6* 4.8*  --   --   --   --  5.5* LPSE  --  1,024*  --   --   --   --  2,516* PTP 15.4*  --   --   --   --  14.9*  --   
INR 1.2  --   --   --   --  1.2  --   
 
 
Assessment: Principal Problem: 
  Septic shock ([facility] (8/8/2019) Active Problems: 
  Uncontrolled type 2 diabetes mellitus with hyperglycemia ([facility] (6/15/2015) Chronic kidney disease, stage III (moderate) (HCC) (6/15/2015) Acute renal failure (ARF) ([facility] [address] (8/8/2019) Elevated liver function tests (8/8/2019) Anemia (8/8/2019) Right lower quadrant abdominal pain (8/8/2019) Gram-negative bacteremia (8/9/2019) 78 y.o. female with PMH of (but not limited to) CAD with history of CABG on Plavix, DM, HTN, HLD, hx of ischemic colitis, diverticular disease, an ampullary stenosis who we are following for septic shock with abnormal LFTS. ERCP on 7/24/19 by Dr. Cody Villa revealed dilated CBD, small stone with extraction and probable ampullary stenosis. On admission, she had leukocytosis, septic shock, transaminitis and hyperlipasemia. CT of the abdomen/pelvis on 8/8 revealed some mesenteric stranding. She has gram (-) rods on blood culture. She is on Vancomycin, Flagyl and Rocephin. Likely developed post ERCP pancreatitis, but suspect she has cholangitis. Transaminases are now improving.  
  
Labs 8/9/19:WBC 15.2( 21.5), Hgb of  6.4(7.6) (previously 9.9 on 2/4), glucose 435(99) BUN 86(117),  Creat 1.95( 2.19), T bili 0.4(1.1), (6666), (1883), (165),8/9/19 lipase 1024( 2516), procalcitonin 9.5 on 8/8.  
 
Plan: 1. ERCP today by Dr. Sudhir Tony. Discussed risks and benefits with patient to include risk of infection, bleeding, perforation, anesthesia, worsening pancreatitis and possible mortality. She verbalized understanding and wishes to proceed with ERCP. 2.NPO until procedure 3. Trend labs 4. Continue supportive care 5. Agree with antibiotics 6. Await results of repeat US Eugenia Case. Shaila Angulo in collaboration with Dr. Sudhir Tony [facility]

## 2019-08-10 NOTE — PROGRESS NOTES
TRANSFER - OUT REPORT: 
 
Verbal report given to Maile Baker RN on Duane Hickey  being transferred to ICU for routine post - op Report consisted of patients Situation, Background, Assessment and Recommendations(SBAR). Information from the following report(s) SBAR, Kardex, Procedure Summary, Intake/Output and MAR was reviewed with the receiving nurse. Opportunity for questions and clarification was provided. Blood currently transfusing at 150mL/hr. Pt will go up with RN accompaniment on monitor

## 2019-08-10 NOTE — PROGRESS NOTES
Pt transported to preop holding in preparation for ERCP. Second unit of blood started infusing immediately prior to transporting and preop RN notified of initiation. Consents on the chart. Patients daughter aware of ERCP. Vitals stable. Afebrile. No stated pain. No distress. 2L via NC. D5 NS infusing at 125. Report given and care transferred to preop.

## 2019-08-10 NOTE — PROGRESS NOTES
Critical Care Daily Progress Note: 8/10/2019 Admission Date: 8/8/2019 The patient's chart is reviewed and the patient is discussed with the staff. Vidal Franco is an 78yoF with PMH of CABG, HTN, HLD, Stage III CKD, s/p cholecystectomy, GERD who underwent ERCP on  7/24 for dilated CBD with only small stone and no clear etiology identified. She presented to ER 2 weeks later with 2 days of acute symptoms of abd pain, anorexia, nausea progressing to RLQ pain which was severe. Upon presentation she was hypotensive with elevated lipase >2500, lactic acidosis, PRATIMA, transaminitis with concern for ischemia/shock liver vs cholangitis. She now has evidence of gram negative bacteremia. Subjective:  
Feeling improved off vasopressors with \"teeny\" amount of abdominal pain, no vomiting. H/H reduced this morning frmo 7.6 yesterday to 6.4 today. GNR susceptibilities pending No BM yet for FOBT Creatinine improved to 1.95 from 2.19 Plans are for ERCP today. Current Facility-Administered Medications Medication Dose Route Frequency  0.9% sodium chloride infusion 250 mL  250 mL IntraVENous PRN  
 0.9% sodium chloride infusion 250 mL  250 mL IntraVENous PRN  
 insulin glargine (LANTUS) injection 50 Units  50 Units SubCUTAneous QHS  Vancomycin Intermittent therapy-Pharmacy to dose   Other Rx Dosing/Monitoring  tuberculin injection 5 Units  5 Units IntraDERMal ONCE  
 dextrose 5% and 0.9% NaCl infusion  125 mL/hr IntraVENous CONTINUOUS  
 insulin lispro (HUMALOG) injection   SubCUTAneous AC&HS  sodium chloride (NS) flush 5-10 mL  5-10 mL IntraVENous PRN  
 0.9% sodium chloride infusion 250 mL  250 mL IntraVENous PRN  
 NOREPINephrine (LEVOPHED) 4 mg/250 mL (16 mcg/mL) in NS infusion  2-16 mcg/min IntraVENous TITRATE  sodium chloride (NS) flush 5-40 mL  5-40 mL IntraVENous Q8H  
 sodium chloride (NS) flush 5-40 mL  5-40 mL IntraVENous PRN  
  ondansetron (ZOFRAN) injection 4 mg  4 mg IntraVENous Q4H PRN  
 cefTRIAXone (ROCEPHIN) 2 g in 0.9% sodium chloride (MBP/ADV) 50 mL  2 g IntraVENous Q24H  
 metroNIDAZOLE (FLAGYL) IVPB premix 500 mg  500 mg IntraVENous Q12H  pantoprazole (PROTONIX) 40 mg in sodium chloride 0.9% 10 mL injection  40 mg IntraVENous Q24H  
 dextrose 40% (GLUTOSE) oral gel 1 Tube  15 g Oral PRN  
 glucagon (GLUCAGEN) injection 1 mg  1 mg IntraMUSCular PRN  
 dextrose (D50W) injection syrg 12.5-25 g  25-50 mL IntraVENous PRN Review of Systems Constitutional:  negative for fever, chills, sweats Cardiovascular:  negative for chest pain, palpitations, syncope, edema Gastrointestinal:  negative for dysphagia, reflux, vomiting, diarrhea, abdominal pain, or melena Neurologic:  negative for focal weakness, numbness, headache Objective:  
 
Vitals:  
 08/10/19 9505 08/10/19 0500 08/10/19 0529 08/10/19 0750 BP: 94/51 120/54 103/54 Pulse: 81 74 75 Resp: 21 18 22 Temp:      
SpO2: 99% 100% 100% 98% Weight:   146 lb 6.2 oz (66.4 kg) Height:      
 
 
 
Intake/Output Summary (Last 24 hours) at 8/10/2019 6758 Last data filed at 8/10/2019 8501 Gross per 24 hour Intake 2422.5 ml Output 4000 ml Net -1577.5 ml Physical Exam:         
Constitutional:  intubated and mechanically ventilated. EENMT:  Sclera clear, pupils equal, oral mucosa moist 
Respiratory: CTA Cardiovascular:  RRR with no M,G,R; 
Gastrointestinal:  mild tenderness in abd, no distention, +BS Musculoskeletal:  warm with no cyanosis, no lower extremity edema Skin:  no jaundice or ecchymosis Neurologic: no gross neuro deficits Psychiatric:  alert and oriented x 3 LINES:   
CVC 8/8/19 on left [place] DRIPS:   
D5NS 
 
CXR:   
 
 
CT abd/pelvis: 1. The only potential etiology of sepsis is that there is some wall thickening 
suggested of more proximal small bowel loops in the left abdomen with mild adjacent mesenteric stranding which can suggest inflammation (i.e. enteritis). No free air or abnormal fluid collection is definitely seen on this limited 
noncontrast study. 2. Nonspecific basilar airspace changes of the lungs likely representing 
atelectasis given the distribution although pneumonia is not excluded if the 
patient has symptoms of pneumonia. 3. Gas within the urinary bladder although this very well may have been 
introduced with [place] catheter placement. This would be best further assessed 
with urinalysis if this has not been recently performed. ABG: No results for input(s): PH, PCO2, PO2, HCO3, PHI, PCO2I, PO2I, HCO3I in the last 72 hours. LAB Recent Labs 08/09/19 
2214 08/09/19 
1430 08/09/19 
0526 08/09/19 
3513 08/09/19 
0413 GLUCPOC 402* 298* 102* 108* 121* Recent Labs 08/10/19 
7436 08/09/19 
0791 08/09/19 
0012  08/08/19 
1817 08/08/19 
1228 WBC 15.2* 21.5*  --   --   --  32.7* HGB 6.4* 7.6* 6.8*   < >  --  6.8* HCT 19.1* 23.2* 20.7*   < >  --  21.4*  
* 123*  --   --   --  194 INR 1.2  --   --   --  1.2  --   
 < > = values in this interval not displayed. Recent Labs 08/10/19 
4513 08/09/19 
1105 08/09/19 
0012 08/08/19 
2146 08/08/19 
1817  08/08/19 
1228 * 146* 143 141 136  --  132* K 4.5 4.9 4.6 4.9 5.5*  --  5.0  
* 115* 110* 108* 104  --  97* CO2 23 25 24 23 20*  --  20* * 99 286* 384* 518*  --  550* BUN 86* 117* 127* 125* 130*  --  139* CREA 1.95* 2.19* 2.47* 2.63* 2.82*  --  3.35* MG  --  2.4 2.4 2.5* 2.5*   < >  --   
PHOS  --  2.4  --   --  4.7*  --   --   
CA 8.0* 7.7* 8.0* 8.0* 7.9*  --  8.5 ALB 2.1* 2.2*  --   --   --   --  2.8* SGOT 495* 1,883*  --   --   --   --  4,996*  
 < > = values in this interval not displayed. Recent Labs 08/09/19 
0012 08/08/19 
1817 LAC 2.0 3.2* Assessment:  (Medical Decision Making) Hospital Problems  Date Reviewed: 1/30/2019 Codes Class Noted POA * (Principal) Septic shock ([facility] [address] [address] ICD-10-CM: A41.9, R65.21 ICD-9-CM: 038.9, 785.52, 995.92  8/8/2019 Yes Off vasopressors with multiorgan dysfunction improving. Acute renal failure (ARF) (HCC) ICD-10-CM: N17.9 ICD-9-CM: 584.9  8/8/2019 Yes Improving renal function with good UOP and creatinine down to 1.95 (baseline close to 1.5) Elevated liver function tests ICD-10-CM: R94.5 ICD-9-CM: 790.6  8/8/2019 Yes Trending down Anemia ICD-10-CM: D64.9 ICD-9-CM: 285.9  8/8/2019 Yes Transfusing today. FOBT pending. Right lower quadrant abdominal pain ICD-10-CM: R10.31 ICD-9-CM: 789.03  8/8/2019 Unknown Improved. Uncontrolled type 2 diabetes mellitus with hyperglycemia (HCC) (Chronic) ICD-10-CM: E11.65 ICD-9-CM: 250.02  6/15/2015 Yes Adding D5 with ongoing lantus Chronic kidney disease, stage III (moderate) (HCC) (Chronic) ICD-10-CM: N18.3 ICD-9-CM: 585.3  6/15/2015 Yes With acute component improving. Gram negative bacteremia:  Below. Concern for cholangitis. Source of bacteremia is not absolutely clear. Otherwise treating pancreatitis, septic shock, PRATIMA, shock liver. Plan:  (Medical Decision Making) --NPO for ERCP, which she agrees to. Will continue D5NS since getting lantus but will use glucose stabilizer protocol to aim to For FSBS 140-180. --Continue ceftriaxone/flagyl day #3 
--Check FOBT this morning and tranfuse 1U PRBCs for goal 7.0.   
--monitor in ICU post-ERCP today. --when taking po will change IV fluids to normal saline at 100ml/hr without D5. More than 50% of the time documented was spent in face-to-face contact with the patient and in the care of the patient on the floor/unit where the patient is located.  
 
Ashish Pinon MD

## 2019-08-11 LAB
ABO + RH BLD: NORMAL
ALBUMIN SERPL-MCNC: 2.1 G/DL (ref 3.2–4.6)
ALBUMIN/GLOB SERPL: 0.8 {RATIO} (ref 1.2–3.5)
ALP SERPL-CCNC: 236 U/L (ref 50–136)
ALT SERPL-CCNC: 680 U/L (ref 12–65)
ANION GAP SERPL CALC-SCNC: 7 MMOL/L (ref 7–16)
AST SERPL-CCNC: 136 U/L (ref 15–37)
BILIRUB SERPL-MCNC: 0.5 MG/DL (ref 0.2–1.1)
BLD PROD TYP BPU: NORMAL
BLOOD GROUP ANTIBODIES SERPL: NORMAL
BPU ID: NORMAL
BUN SERPL-MCNC: 42 MG/DL (ref 8–23)
CALCIUM SERPL-MCNC: 7.9 MG/DL (ref 8.3–10.4)
CHLORIDE SERPL-SCNC: 119 MMOL/L (ref 98–107)
CO2 SERPL-SCNC: 24 MMOL/L (ref 21–32)
CREAT SERPL-MCNC: 1.38 MG/DL (ref 0.6–1)
CROSSMATCH RESULT,%XM: NORMAL
ERYTHROCYTE [DISTWIDTH] IN BLOOD BY AUTOMATED COUNT: 16.4 % (ref 11.9–14.6)
GLOBULIN SER CALC-MCNC: 2.8 G/DL (ref 2.3–3.5)
GLUCOSE BLD STRIP.AUTO-MCNC: 118 MG/DL (ref 65–100)
GLUCOSE BLD STRIP.AUTO-MCNC: 246 MG/DL (ref 65–100)
GLUCOSE BLD STRIP.AUTO-MCNC: 266 MG/DL (ref 65–100)
GLUCOSE BLD STRIP.AUTO-MCNC: 86 MG/DL (ref 65–100)
GLUCOSE SERPL-MCNC: 103 MG/DL (ref 65–100)
HCT VFR BLD AUTO: 27.6 % (ref 35.8–46.3)
HEMOCCULT STL QL: POSITIVE
HGB BLD-MCNC: 9 G/DL (ref 11.7–15.4)
MCH RBC QN AUTO: 29.9 PG (ref 26.1–32.9)
MCHC RBC AUTO-ENTMCNC: 32.6 G/DL (ref 31.4–35)
MCV RBC AUTO: 91.7 FL (ref 79.6–97.8)
MM INDURATION POC: 0 MM (ref 0–5)
NRBC # BLD: 0.02 K/UL (ref 0–0.2)
PLATELET # BLD AUTO: 100 K/UL (ref 150–450)
PMV BLD AUTO: 12.2 FL (ref 9.4–12.3)
POTASSIUM SERPL-SCNC: 4.5 MMOL/L (ref 3.5–5.1)
PPD POC: NEGATIVE NEGATIVE
PROT SERPL-MCNC: 4.9 G/DL (ref 6.3–8.2)
RBC # BLD AUTO: 3.01 M/UL (ref 4.05–5.2)
SODIUM SERPL-SCNC: 150 MMOL/L (ref 136–145)
SPECIMEN EXP DATE BLD: NORMAL
STATUS OF UNIT,%ST: NORMAL
UNIT DIVISION, %UDIV: 0
WBC # BLD AUTO: 12.2 K/UL (ref 4.3–11.1)

## 2019-08-11 PROCEDURE — 85027 COMPLETE CBC AUTOMATED: CPT

## 2019-08-11 PROCEDURE — 74011000258 HC RX REV CODE- 258: Performed by: INTERNAL MEDICINE

## 2019-08-11 PROCEDURE — 94760 N-INVAS EAR/PLS OXIMETRY 1: CPT

## 2019-08-11 PROCEDURE — 77030020263 HC SOL INJ SOD CL0.9% LFCR 1000ML

## 2019-08-11 PROCEDURE — 74011000250 HC RX REV CODE- 250: Performed by: INTERNAL MEDICINE

## 2019-08-11 PROCEDURE — 77010033678 HC OXYGEN DAILY

## 2019-08-11 PROCEDURE — 65270000029 HC RM PRIVATE

## 2019-08-11 PROCEDURE — 74011250637 HC RX REV CODE- 250/637: Performed by: INTERNAL MEDICINE

## 2019-08-11 PROCEDURE — 74011636637 HC RX REV CODE- 636/637: Performed by: INTERNAL MEDICINE

## 2019-08-11 PROCEDURE — 82962 GLUCOSE BLOOD TEST: CPT

## 2019-08-11 PROCEDURE — 80053 COMPREHEN METABOLIC PANEL: CPT

## 2019-08-11 PROCEDURE — 99233 SBSQ HOSP IP/OBS HIGH 50: CPT | Performed by: INTERNAL MEDICINE

## 2019-08-11 PROCEDURE — C9113 INJ PANTOPRAZOLE SODIUM, VIA: HCPCS | Performed by: INTERNAL MEDICINE

## 2019-08-11 PROCEDURE — 74011250636 HC RX REV CODE- 250/636: Performed by: INTERNAL MEDICINE

## 2019-08-11 RX ORDER — LOSARTAN POTASSIUM 50 MG/1
100 TABLET ORAL DAILY
Status: DISCONTINUED | OUTPATIENT
Start: 2019-08-11 | End: 2019-08-16 | Stop reason: HOSPADM

## 2019-08-11 RX ADMIN — INSULIN GLARGINE 50 UNITS: 100 INJECTION, SOLUTION SUBCUTANEOUS at 20:59

## 2019-08-11 RX ADMIN — Medication 10 ML: at 16:12

## 2019-08-11 RX ADMIN — Medication 1 EACH: at 21:00

## 2019-08-11 RX ADMIN — MEROPENEM 500 MG: 500 INJECTION, POWDER, FOR SOLUTION INTRAVENOUS at 11:45

## 2019-08-11 RX ADMIN — Medication 20 ML: at 06:00

## 2019-08-11 RX ADMIN — Medication 40 ML: at 16:12

## 2019-08-11 RX ADMIN — SODIUM CHLORIDE 40 MG: 9 INJECTION, SOLUTION INTRAMUSCULAR; INTRAVENOUS; SUBCUTANEOUS at 16:08

## 2019-08-11 RX ADMIN — METRONIDAZOLE 500 MG: 500 INJECTION, SOLUTION INTRAVENOUS at 04:10

## 2019-08-11 RX ADMIN — INSULIN LISPRO 9 UNITS: 100 INJECTION, SOLUTION INTRAVENOUS; SUBCUTANEOUS at 20:59

## 2019-08-11 RX ADMIN — MEROPENEM 500 MG: 500 INJECTION, POWDER, FOR SOLUTION INTRAVENOUS at 17:44

## 2019-08-11 RX ADMIN — INSULIN LISPRO 6 UNITS: 100 INJECTION, SOLUTION INTRAVENOUS; SUBCUTANEOUS at 16:07

## 2019-08-11 RX ADMIN — LOSARTAN POTASSIUM 100 MG: 50 TABLET ORAL at 11:45

## 2019-08-11 RX ADMIN — VANCOMYCIN HYDROCHLORIDE 1000 MG: 1 INJECTION, POWDER, LYOPHILIZED, FOR SOLUTION INTRAVENOUS at 08:58

## 2019-08-11 RX ADMIN — Medication 5 ML: at 21:00

## 2019-08-11 NOTE — PROGRESS NOTES
Patient received to room 819 from icu  Alert and oriented  No distress  Dual skin assessment  No pressure ulcer

## 2019-08-11 NOTE — PROGRESS NOTES
GI DAILY PROGRESS NOTE Admit Date:  2019 Today's Date:  2019 Subjective:  
 
Patient denies abdominal pain. Ate some breakfast this am. HD stable. Planning to transfer out of ICU today Medications:  
Current Facility-Administered Medications Medication Dose Route Frequency  losartan (COZAAR) tablet 100 mg  100 mg Oral DAILY  meropenem (MERREM) 500 mg in 0.9% sodium chloride (MBP/ADV) 50 mL  500 mg IntraVENous Q8H  
 0.9% sodium chloride infusion 250 mL  250 mL IntraVENous PRN  
 insulin lispro (HUMALOG) injection   SubCUTAneous AC&HS  vancomycin (VANCOCIN) 1,000 mg in 0.9% sodium chloride (MBP/ADV) 250 mL  1,000 mg IntraVENous Q24H  
 0.9% sodium chloride infusion 250 mL  250 mL IntraVENous PRN  
 insulin glargine (LANTUS) injection 50 Units  50 Units SubCUTAneous QHS  sodium chloride (NS) flush 5-10 mL  5-10 mL IntraVENous PRN  
 0.9% sodium chloride infusion 250 mL  250 mL IntraVENous PRN  
 sodium chloride (NS) flush 5-40 mL  5-40 mL IntraVENous Q8H  
 sodium chloride (NS) flush 5-40 mL  5-40 mL IntraVENous PRN  
 ondansetron (ZOFRAN) injection 4 mg  4 mg IntraVENous Q4H PRN  pantoprazole (PROTONIX) 40 mg in sodium chloride 0.9% 10 mL injection  40 mg IntraVENous Q24H  
 dextrose 40% (GLUTOSE) oral gel 1 Tube  15 g Oral PRN  
 glucagon (GLUCAGEN) injection 1 mg  1 mg IntraMUSCular PRN  
 dextrose (D50W) injection syrg 12.5-25 g  25-50 mL IntraVENous PRN Review of Systems: ROS was obtained, with pertinent positives as listed above. No chest pain or SOB. Diet:   
 
Objective:  
Vitals: 
Visit Vitals /75 Pulse 66 Temp 98.1 °F (36.7 °C) Resp 14 Ht 5' 3\" (1.6 m) Wt 67.2 kg (148 lb 2.4 oz) SpO2 100% BMI 26.24 kg/m² Intake/Output: 
No intake/output data recorded.  1901 -  0700 In: 4052.5 [P.O.:840; I.V.:2512.5] Out: 3429 [INFD] Exam: 
General appearance: alert, cooperative, no distress, sitting up in bed Lungs: on 02 via NC mild rhonchi Heart: regular rate and rhythm Abdomen: soft, non-tender. Bowel sounds normal. No masses, no organomegaly Extremities: extremities normal, atraumatic, no cyanosis or edema Neuro:  alert and oriented Data Review (Labs):   
Recent Labs 08/11/19 
0619 08/10/19 2015 08/10/19 
0341 08/09/19 
0520 08/09/19 
0012 08/08/19 
2146 08/08/19 
1900 08/08/19 
1817 08/08/19 
1228 WBC 12.2*  --  15.2* 21.5*  --   --   --   --  32.7* HGB 9.0* 9.4* 6.4* 7.6* 6.8*  --  7.2*  --  6.8* HCT 27.6* 28.9* 19.1* 23.2* 20.7*  --  22.2*  --  21.4*  
*  --  123* 123*  --   --   --   --  194 MCV 91.7  --  93.2 92.8  --   --   --   --  96.8 *  --  146* 146* 143 141  --  136 132* K 4.5  --  4.5 4.9 4.6 4.9  --  5.5* 5.0  
*  --  116* 115* 110* 108*  --  104 97* CO2 24  --  23 25 24 23  --  20* 20* BUN 42*  --  86* 117* 127* 125*  --  130* 139* CREA 1.38*  --  1.95* 2.19* 2.47* 2.63*  --  2.82* 3.35* CA 7.9*  --  8.0* 7.7* 8.0* 8.0*  --  7.9* 8.5 MG  --   --   --  2.4 2.4 2.5*  --  2.5*  --   
*  --  435* 99 286* 384*  --  518* 550* *  --  181* 165*  --   --   --   --  180* SGOT 136*  --  495* [id number]*  --   --   --   --  1,335* *  --  986* [id number]*  --   --   --   --  2,586* TBILI 0.5  --  0.4 1.1  --   --   --   --  0.4 ALB 2.1*  --  2.1* 2.2*  --   --   --   --  2.8*  
TP 4.9*  --  4.6* 4.8*  --   --   --   --  5.5* LPSE  --   --   --  1,024*  --   --   --   --  2,516* PTP  --   --  15.4*  --   --   --   --  14.9*  --   
INR  --   --  1.2  --   --   --   --  1.2  --   
 
 
Assessment:  
 
Principal Problem: 
  Septic shock ([facility] (8/8/2019) Active Problems: 
  Uncontrolled type 2 diabetes mellitus with hyperglycemia ([facility] (6/15/2015) Chronic kidney disease, stage III (moderate) (HCC) (6/15/2015) Acute renal failure (ARF) ([facility] (8/8/2019) Elevated liver function tests (8/8/2019) Anemia (8/8/2019) Right lower quadrant abdominal pain (8/8/2019) Gram-negative bacteremia (8/9/2019) Plan:  
 
78year old woman presented in septic shock 2 weeks after ERCP, with labs suggesting ischemic hepatitis, pancreatitis with PRATIMA. Non contrast CT unrevealing, ultrasound with dopplers ruled out mesenteric vascular clot. She has bacteremia and ESBL-producing E. Coli in urine culture from 8/8 but UA generally bland on admission and no dysuria. ERCP yesterday to evaluate and treat for possible cholangitis, stent placed. Biliary drainage did not appear adequate due to edematous ampulla in the setting of pancreatitis, so stent was placed. She continues to improve. Abx broadened to cover for ESBL. Will continue to follow

## 2019-08-11 NOTE — PROGRESS NOTES
TRANSFER - IN REPORT: 
 
Verbal report received from Severiano(name) on Balwinder Melara  being received from ICU(unit) for routine progression of care Report consisted of patients Situation, Background, Assessment and  
Recommendations(SBAR). Information from the following report(s) SBAR was reviewed with the receiving nurse. Opportunity for questions and clarification was provided. Assessment completed upon patients arrival to unit and care assumed.

## 2019-08-11 NOTE — PROGRESS NOTES
[facility] OUTREACH NURSE PROGRESS REPORT SUBJECTIVE: Called to assess patient secondary to transfer from ICU. MEWS Score: 1 (08/11/19 1322) Vitals:  
 08/11/19 1245 08/11/19 1322 08/11/19 1506 08/11/19 1535 BP: 176/79 146/67 150/68 Pulse: 80 75 73 Resp: (!) 32 20 20 Temp:  98.1 °F (36.7 °C) 98.5 °F (36.9 °C) SpO2: 93% 97% 99% 98% Weight:      
Height:      
  
 
LAB DATA: 
 
Recent Labs 08/11/19 
1490 08/10/19 
7734 08/09/19 
6540 08/09/19 
0012 08/08/19 
2146 08/08/19 
1817 * 146* 146* 143 141 136  
K 4.5 4.5 4.9 4.6 4.9 5.5*  
* 116* 115* 110* 108* 104 CO2 24 23 25 24 23 20* AGAP 7 7 6* 9 10 12 * 435* 99 286* 384* 518* BUN 42* 86* 117* 127* 125* 130* CREA 1.38* 1.95* 2.19* 2.47* 2.63* 2.82* GFRAA 47* 32* 28* 24* 22* 21* GFRNA 39* 26* 23* 20* 19* 17* CA 7.9* 8.0* 7.7* 8.0* 8.0* 7.9*  
MG  --   --  2.4 2.4 2.5* 2.5* PHOS  --   --  2.4  --   --  4.7* ALB 2.1* 2.1* 2.2*  --   --   --   
TP 4.9* 4.6* 4.8*  --   --   --   
GLOB 2.8 2.5 2.6  --   --   --   
AGRAT 0.8* 0.8* 0.8*  --   --   --   
* 986* 1,636*  --   --   --   
  
 
Recent Labs 08/11/19 
0078 08/10/19 2015 08/10/19 
0341 08/09/19 
8938 WBC 12.2*  --  15.2* 21.5* HGB 9.0* 9.4* 6.4* 7.6* HCT 27.6* 28.9* 19.1* 23.2*  
*  --  123* 123* OBJECTIVE: On arrival to room, I found patient to be sitting up in bed. Pain Assessment Pain Intensity 1: 0 (08/11/19 1322) Pain Location 1: Abdomen, Leg 
Pain Intervention(s) 1: Position, Nurse notified Patient Stated Pain Goal: 0 
 
ASSESSMENT: Patient alert and oriented. Respirations even and unlabored. SaO2 97% on 2L NC. Denies complaints. VS, labs, and progress notes reviewed. PLAN:  Will continue to follow per outreach protocol.

## 2019-08-11 NOTE — PROGRESS NOTES
TRANSFER - OUT REPORT: 
 
Verbal report given to Swetha Posey RN(name) on Bath Card  being transferred to 819(unit) for routine progression of care Report consisted of patients Situation, Background, Assessment and  
Recommendations(SBAR). Information from the following report(s) SBAR, Kardex, ED Summary, Procedure Summary, Recent Results and Cardiac Rhythm Sinus Rhythm/Sinus Perry Jean Baptiste was reviewed with the receiving nurse. Opportunity for questions and clarification was provided. Patient transported with: 
 O2 @ 2 liters Registered Nurse

## 2019-08-11 NOTE — PROGRESS NOTES
Critical Care Daily Progress Note: 8/11/2019 Admission Date: 8/8/2019 The patient's chart is reviewed and the patient is discussed with the staff. Greg Dickens is an 78yoF with PMH of CABG, HTN, HLD, Stage III CKD, s/p cholecystectomy, GERD who underwent ERCP on  7/24 for dilated CBD with only small stone and no clear etiology identified. She presented to ER 2 weeks later with 2 days of acute symptoms of abd pain, anorexia, nausea progressing to RLQ pain which was severe. Upon presentation she was hypotensive with elevated lipase >2500, lactic acidosis, PRATIMA, transaminitis with concern for ischemia/shock liver vs cholangitis. ERCP with biliary sludge and stent placed to CBD. Subjective:  
RLQ pain improved On 4lpm and comfortable FOBT yesterday positive for occult blood H/H stable Leukocytosis improving ESBL-producing E. Coli in urine culture from 8/8 but UA generally bland on admission and no dysuria. Clinically improving on ceftriaxone/flagyl. Blood cultures with enterococcus and e. Coli. Current Facility-Administered Medications Medication Dose Route Frequency  losartan (COZAAR) tablet 100 mg  100 mg Oral DAILY  0.9% sodium chloride infusion 250 mL  250 mL IntraVENous PRN  
 insulin lispro (HUMALOG) injection   SubCUTAneous AC&HS  vancomycin (VANCOCIN) 1,000 mg in 0.9% sodium chloride (MBP/ADV) 250 mL  1,000 mg IntraVENous Q24H  
 0.9% sodium chloride infusion 250 mL  250 mL IntraVENous PRN  
 insulin glargine (LANTUS) injection 50 Units  50 Units SubCUTAneous QHS  sodium chloride (NS) flush 5-10 mL  5-10 mL IntraVENous PRN  
 0.9% sodium chloride infusion 250 mL  250 mL IntraVENous PRN  
 sodium chloride (NS) flush 5-40 mL  5-40 mL IntraVENous Q8H  
 sodium chloride (NS) flush 5-40 mL  5-40 mL IntraVENous PRN  
 ondansetron (ZOFRAN) injection 4 mg  4 mg IntraVENous Q4H PRN  
 cefTRIAXone (ROCEPHIN) 2 g in 0.9% sodium chloride (MBP/ADV) 50 mL  2 g IntraVENous Q24H  
 metroNIDAZOLE (FLAGYL) IVPB premix 500 mg  500 mg IntraVENous Q12H  pantoprazole (PROTONIX) 40 mg in sodium chloride 0.9% 10 mL injection  40 mg IntraVENous Q24H  
 dextrose 40% (GLUTOSE) oral gel 1 Tube  15 g Oral PRN  
 glucagon (GLUCAGEN) injection 1 mg  1 mg IntraMUSCular PRN  
 dextrose (D50W) injection syrg 12.5-25 g  25-50 mL IntraVENous PRN Review of Systems Constitutional:  negative for fever, chills, sweats Cardiovascular:  negative for chest pain, palpitations, syncope, edema Gastrointestinal:  negative for dysphagia, reflux, vomiting, diarrhea, abdominal pain, or melena Neurologic:  negative for focal weakness, numbness, headache Objective:  
 
Vitals:  
 08/11/19 7089 08/11/19 9278 08/11/19 7887 08/11/19 1841 BP: 134/65 137/67 140/75 Pulse: (!) 56 60 66 Resp: 18 17 14 Temp:      
SpO2: 100% 100% 100% Weight:    148 lb 2.4 oz (67.2 kg) Height:      
 
 
 
Intake/Output Summary (Last 24 hours) at 8/11/2019 4144 Last data filed at 8/11/2019 6618 Gross per 24 hour Intake 1630 ml Output 2680 ml Net -1050 ml Physical Exam:         
Constitutional:  intubated and mechanically ventilated. EENMT:  Sclera clear, pupils equal, oral mucosa moist 
Respiratory: CTA Cardiovascular:  RRR with no M,G,R; 
Gastrointestinal:  mild tenderness in abd, no distention, +BS Musculoskeletal:  warm with no cyanosis, no lower extremity edema Skin:  no jaundice or ecchymosis Neurologic: no gross neuro deficits Psychiatric:  alert and oriented x 3 LINES:   
CVC 8/8/19 on left Clifton DRIPS:   
D5NS 
 
CXR:   
 
 
CT abd/pelvis: 1. The only potential etiology of sepsis is that there is some wall thickening 
suggested of more proximal small bowel loops in the left abdomen with mild 
adjacent mesenteric stranding which can suggest inflammation (i.e. enteritis). No free air or abnormal fluid collection is definitely seen on this limited noncontrast study. 2. Nonspecific basilar airspace changes of the lungs likely representing 
atelectasis given the distribution although pneumonia is not excluded if the 
patient has symptoms of pneumonia. 3. Gas within the urinary bladder although this very well may have been 
introduced with Clifton catheter placement. This would be best further assessed 
with urinalysis if this has not been recently performed. ABG: No results for input(s): PH, PCO2, PO2, HCO3, PHI, PCO2I, PO2I, HCO3I in the last 72 hours. LAB Recent Labs 08/10/19 
2258 08/10/19 
1722 08/10/19 33 64 74 08/10/19 
1139 08/10/19 
0176 GLUCPOC 220* 258* 230* 376* 425* Recent Labs 08/11/19 
1253 08/10/19 2015 08/10/19 
0341 08/09/19 
0520  08/08/19 
1817 WBC 12.2*  --  15.2* 21.5*  --   --   
HGB 9.0* 9.4* 6.4* 7.6*   < >  --   
HCT 27.6* 28.9* 19.1* 23.2*   < >  --   
*  --  123* 123*  --   --   
INR  --   --  1.2  --   --  1.2  
 < > = values in this interval not displayed. Recent Labs 08/11/19 
7834 08/10/19 
1354 08/09/19 
2955 08/09/19 
0012 08/08/19 
2146 08/08/19 
1817 * 146* 146* 143 141 136  
K 4.5 4.5 4.9 4.6 4.9 5.5*  
* 116* 115* 110* 108* 104 CO2 24 23 25 24 23 20* * 435* 99 286* 384* 518* BUN 42* 86* 117* 127* 125* 130* CREA 1.38* 1.95* 2.19* 2.47* 2.63* 2.82* MG  --   --  2.4 2.4 2.5* 2.5* PHOS  --   --  2.4  --   --  4.7*  
CA 7.9* 8.0* 7.7* 8.0* 8.0* 7.9* ALB 2.1* 2.1* 2.2*  --   --   --   
SGOT 136* 495* 1,883*  --   --   --   
 
Recent Labs 08/09/19 
0012 08/08/19 
1817 LAC 2.0 3.2* Assessment:  (Medical Decision Making) Hospital Problems  Date Reviewed: 1/30/2019 Codes Class Noted POA * (Principal) Septic shock ([facility] [address] ICD-10-CM: A41.9, R65.21 ICD-9-CM: 038.9, 785.52, 995.92  8/8/2019 Yes Off vasopressors with multiorgan dysfunction improving. Acute renal failure (ARF) ([facility]) ICD-10-CM: N17.9 ICD-9-CM: 584.9  8/8/2019 Yes Improving renal function with good UOP and creatinine down to 1.95 (baseline close to 1.5) Elevated liver function tests ICD-10-CM: R94.5 ICD-9-CM: 790.6  8/8/2019 Yes Trending down Anemia ICD-10-CM: D64.9 ICD-9-CM: 285.9  8/8/2019 Yes Transfusing today. FOBT pending. Right lower quadrant abdominal pain ICD-10-CM: R10.31 ICD-9-CM: 789.03  8/8/2019 Unknown Improved. Uncontrolled type 2 diabetes mellitus with hyperglycemia (HCC) (Chronic) ICD-10-CM: E11.65 ICD-9-CM: 250.02  6/15/2015 Yes Adding D5 with ongoing lantus Chronic kidney disease, stage III (moderate) (HCC) (Chronic) ICD-10-CM: N18.3 ICD-9-CM: 585.3  6/15/2015 Yes With acute component improving. Gram negative bacteremia:  Below. Broadened to meropenem since urine had ESBL will f/u susceptibilities of enterococcus and e coli in bloodstream and narrow. Concern for cholangitis. Source of bacteremia is not absolutely clear, but biliary source suspected over UTI. Otherwise treating pancreatitis, septic shock, PRATIMA, shock liver. Plan:  (Medical Decision Making) --transfer to floor. 
--d/c IV fluids 
--resume home antihypertensives and insulin regimen 
--Continue day #4 of abx but will broaden to meropenem/vanco in light of ESBL-producing E. Coli in urine & enterococcus, while awaiting specific susceptibilities enterococcus and e coli in bloodstream.  Hope to narrow tomorrow 
--monitor H/H 
--spoke with Dr. Hammonds Click about assuming care tomorrow. More than 50% of the time documented was spent in face-to-face contact with the patient and in the care of the patient on the floor/unit where the patient is located.  
 
Rachel Lara MD

## 2019-08-12 PROBLEM — K85.90 ACUTE PANCREATITIS: Status: ACTIVE | Noted: 2019-08-12

## 2019-08-12 PROBLEM — R65.21 SEPTIC SHOCK (HCC): Status: RESOLVED | Noted: 2019-08-08 | Resolved: 2019-08-12

## 2019-08-12 PROBLEM — A41.9 SEPTIC SHOCK (HCC): Status: RESOLVED | Noted: 2019-08-08 | Resolved: 2019-08-12

## 2019-08-12 LAB
GLUCOSE BLD STRIP.AUTO-MCNC: 170 MG/DL (ref 65–100)
GLUCOSE BLD STRIP.AUTO-MCNC: 205 MG/DL (ref 65–100)
GLUCOSE BLD STRIP.AUTO-MCNC: 241 MG/DL (ref 65–100)
GLUCOSE BLD STRIP.AUTO-MCNC: 264 MG/DL (ref 65–100)
MM INDURATION POC: NORMAL (ref 0–5)
PPD POC: NORMAL
VANCOMYCIN TROUGH SERPL-MCNC: 13 UG/ML (ref 5–20)

## 2019-08-12 PROCEDURE — 74011250637 HC RX REV CODE- 250/637: Performed by: HOSPITALIST

## 2019-08-12 PROCEDURE — 77030020250 HC SOL INJ D 5% LFCR 1000ML BG LF

## 2019-08-12 PROCEDURE — 97161 PT EVAL LOW COMPLEX 20 MIN: CPT

## 2019-08-12 PROCEDURE — 80202 ASSAY OF VANCOMYCIN: CPT

## 2019-08-12 PROCEDURE — 74011000258 HC RX REV CODE- 258: Performed by: INTERNAL MEDICINE

## 2019-08-12 PROCEDURE — 65270000029 HC RM PRIVATE

## 2019-08-12 PROCEDURE — 74011636637 HC RX REV CODE- 636/637: Performed by: INTERNAL MEDICINE

## 2019-08-12 PROCEDURE — 74011250636 HC RX REV CODE- 250/636: Performed by: INTERNAL MEDICINE

## 2019-08-12 PROCEDURE — 74011250636 HC RX REV CODE- 250/636: Performed by: HOSPITALIST

## 2019-08-12 PROCEDURE — 99232 SBSQ HOSP IP/OBS MODERATE 35: CPT | Performed by: INTERNAL MEDICINE

## 2019-08-12 PROCEDURE — 36415 COLL VENOUS BLD VENIPUNCTURE: CPT

## 2019-08-12 PROCEDURE — 77010033678 HC OXYGEN DAILY

## 2019-08-12 PROCEDURE — 82962 GLUCOSE BLOOD TEST: CPT

## 2019-08-12 PROCEDURE — 94760 N-INVAS EAR/PLS OXIMETRY 1: CPT

## 2019-08-12 PROCEDURE — 97530 THERAPEUTIC ACTIVITIES: CPT

## 2019-08-12 PROCEDURE — 87040 BLOOD CULTURE FOR BACTERIA: CPT

## 2019-08-12 PROCEDURE — 74011250637 HC RX REV CODE- 250/637: Performed by: INTERNAL MEDICINE

## 2019-08-12 RX ORDER — METOPROLOL TARTRATE 25 MG/1
12.5 TABLET, FILM COATED ORAL EVERY 12 HOURS
Status: DISCONTINUED | OUTPATIENT
Start: 2019-08-12 | End: 2019-08-16 | Stop reason: HOSPADM

## 2019-08-12 RX ORDER — PANTOPRAZOLE SODIUM 40 MG/1
40 TABLET, DELAYED RELEASE ORAL
Status: DISCONTINUED | OUTPATIENT
Start: 2019-08-12 | End: 2019-08-16 | Stop reason: HOSPADM

## 2019-08-12 RX ORDER — DEXTROSE MONOHYDRATE 50 MG/ML
50 INJECTION, SOLUTION INTRAVENOUS CONTINUOUS
Status: DISCONTINUED | OUTPATIENT
Start: 2019-08-12 | End: 2019-08-13

## 2019-08-12 RX ORDER — DEXTROSE MONOHYDRATE 50 MG/ML
25 INJECTION, SOLUTION INTRAVENOUS CONTINUOUS
Status: DISCONTINUED | OUTPATIENT
Start: 2019-08-12 | End: 2019-08-12

## 2019-08-12 RX ADMIN — MEROPENEM 500 MG: 500 INJECTION, POWDER, FOR SOLUTION INTRAVENOUS at 03:38

## 2019-08-12 RX ADMIN — INSULIN GLARGINE 50 UNITS: 100 INJECTION, SOLUTION SUBCUTANEOUS at 20:58

## 2019-08-12 RX ADMIN — MEROPENEM 500 MG: 500 INJECTION, POWDER, FOR SOLUTION INTRAVENOUS at 09:29

## 2019-08-12 RX ADMIN — INSULIN LISPRO 3 UNITS: 100 INJECTION, SOLUTION INTRAVENOUS; SUBCUTANEOUS at 12:43

## 2019-08-12 RX ADMIN — Medication 5 ML: at 20:58

## 2019-08-12 RX ADMIN — INSULIN LISPRO 6 UNITS: 100 INJECTION, SOLUTION INTRAVENOUS; SUBCUTANEOUS at 06:41

## 2019-08-12 RX ADMIN — METOPROLOL TARTRATE 12.5 MG: 25 TABLET ORAL at 09:29

## 2019-08-12 RX ADMIN — Medication 10 ML: at 12:43

## 2019-08-12 RX ADMIN — Medication 10 ML: at 04:30

## 2019-08-12 RX ADMIN — INSULIN LISPRO 6 UNITS: 100 INJECTION, SOLUTION INTRAVENOUS; SUBCUTANEOUS at 17:29

## 2019-08-12 RX ADMIN — INSULIN LISPRO 9 UNITS: 100 INJECTION, SOLUTION INTRAVENOUS; SUBCUTANEOUS at 20:58

## 2019-08-12 RX ADMIN — METOPROLOL TARTRATE 12.5 MG: 25 TABLET ORAL at 20:56

## 2019-08-12 RX ADMIN — LOSARTAN POTASSIUM 100 MG: 50 TABLET ORAL at 09:29

## 2019-08-12 RX ADMIN — DEXTROSE MONOHYDRATE 50 ML/HR: 5 INJECTION, SOLUTION INTRAVENOUS at 12:44

## 2019-08-12 RX ADMIN — PANTOPRAZOLE SODIUM 40 MG: 40 TABLET, DELAYED RELEASE ORAL at 17:30

## 2019-08-12 RX ADMIN — MEROPENEM 500 MG: 500 INJECTION, POWDER, FOR SOLUTION INTRAVENOUS at 17:29

## 2019-08-12 NOTE — PROGRESS NOTES
Patient lying in bed resting quietly. Respirations regular rate & rhythm on 2L oxygen via nasal cannula. Clifton draining/patent; no abnormalities noted. Urine output for shift 1020 mL clear, yellow urine. No s/sx of distress. Bed in low & locked position. Call light and personal belongings within reach.

## 2019-08-12 NOTE — PROGRESS NOTES
Critical Care Outreach Nurse Progress Report: 
 
Subjective: In to assess pt secondary to f/u ICU transfer. MEWS Score: 1 (08/12/19 0749) Vitals:  
 08/11/19 2355 08/12/19 0301 08/12/19 0749 08/12/19 1157 BP:  147/73 160/72 147/78 Pulse:  (!) 58 61 (!) 56 Resp:  18 17 18 Temp:  98.1 °F (36.7 °C) 98 °F (36.7 °C) 98.6 °F (37 °C) SpO2:  99% 98% 93% Weight: 91.7 kg (202 lb 3.2 oz) Height:      
  
 
Objective: Pt sitting up in bed, awake, in NAD. Pain Intensity 1: 0 (08/12/19 0749) Pain Location 1: Abdomen, Leg 
Pain Intervention(s) 1: Position, Nurse notified Patient Stated Pain Goal: 0 Assessment: A&Ox3. Lung sounds clear, diminished in bases. O2 Sat 99% on 2L NC,  RR even and unlabored at rest.  BP stable. No complaints voiced. Plan: Will follow per outreach protocol.

## 2019-08-12 NOTE — PROGRESS NOTES
Patient transferred to the 8th floor over the weekend. She has both a rollator and a wheelchair available at home. Patient has been to short-term rehab at [facility] in the past. If STR is needed at discharge, she would like to return there. Otherwise, patient would like to return home with home health at discharge. Case Management will continue to follow. Care Management Interventions PCP Verified by CM: Camila Awad) Transition of Care Consult (CM Consult): Discharge Planning Discharge Durable Medical Equipment: No 
Physical Therapy Consult: Yes Occupational Therapy Consult: Yes Speech Therapy Consult: No 
Current Support Network: Lives Alone, Own Home Confirm Follow Up Transport: Family Plan discussed with Pt/Family/Caregiver: Yes Freedom of Choice Offered: Yes Discharge Location Discharge Placement: Unable to determine at this time

## 2019-08-12 NOTE — PROGRESS NOTES
Problem: Mobility Impaired (Adult and Pediatric) Goal: *Acute Goals and Plan of Care (Insert Text) Description LTG: 
(1.)Ms. Cynthia Vanegas will move from supine to sit and sit to supine , scoot up and down and roll side to side in bed with MODIFIED INDEPENDENCE within 7 treatment day(s). (2.)Ms. Cynthia Vanegas will transfer from bed to chair and chair to bed with SUPERVISION using the least restrictive device within 7 treatment day(s). (3.)Ms. Cynthia Vanegas will ambulate with SUPERVISION for 200 feet with the least restrictive device within 7 treatment day(s). (4.)Ms. Cynthia Vanegas will participate in therapeutic activity/exercises x 23 minutes for increased strength within 7 treatment days. (5.)Ms. Cynthia Vanegas will perform standing static and dynamic balance activities x 12 minutes with STAND BY ASSIST to improve safety within 7 treatment day(s). ________________________________________________________________________________________________ Outcome: Progressing Towards Goal 
  
PHYSICAL THERAPY: Initial Assessment and AM 8/12/2019 INPATIENT: PT Visit Days : 1 Payor: Tirso Davalos / Plan: [address] / Product Type: Managed Care Medicare /   
  
NAME/AGE/GENDER: Rafaela Arguello is a 78 y.o. female PRIMARY DIAGNOSIS: Septic shock ([address] [A41.9, R65.21] Septic shock ([address] [A41.9, R65.21] Septic shock ([address] Septic shock ([address] Procedure(s) (LRB): ENDOSCOPIC RETROGRADE CHOLANGIOPANCREATOGRAPHY (ERCP) (N/A) ENDOSCOPIC STONE EXTRACTION/BALLOON SWEEP (N/A) BILIARY STENT PLACEMENT (N/A) 2 Days Post-Op ICD-10: Treatment Diagnosis:  
 Generalized Muscle Weakness (M62.81) Difficulty in walking, Not elsewhere classified (R26.2) History of falling (Z91.81) Precaution/Allergies: 
Sulfa (sulfonamide antibiotics) ASSESSMENT:  
 
Ms. yCnthia Vanegas is a 78 y.o. female in the hospital for the above who was supine in bed upon arrival.  Pt reports that she lives in a senior apartment alone that has 0 steps to enter. Pt also reported that PTA she was recently needing to get assistance for some ADLs from her aid. Pt also stated that she was ambulatory household distances with PRN use of AD. Pt admitted to one recent fall in the past year. Ms. Flor Keller presents to PT with [facility] and decreased strength in B hip flexors (4-/5). During evaluation pt performed bed mobility with additional time and SBA. She demonstrated good sitting balance as well. Ms. Flor Keller could benefit from skilled PT as she is currently functioning below her baseline. Pt received treatment of therapeutic activity. She was able to stand with Bonita/RW demonstrating fair standing balance. Pt required cuing for upright posture balance. She performed standing activities and noted to have had small black/tarry BM (RN alerted). Pt then ambulated around room with RW/CGA-Bonita. Her gait speed was decreased and steps shuffled. Pt transferred to chair and performed sitting exercise. This section established at most recent assessment PROBLEM LIST (Impairments causing functional limitations): 
Decreased Strength Decreased ADL/Functional Activities Decreased Transfer Abilities Decreased Ambulation Ability/Technique Decreased Balance Decreased Activity Tolerance INTERVENTIONS PLANNED: (Benefits and precautions of physical therapy have been discussed with the patient.) Balance Exercise Bed Mobility Family Education Gait Training Therapeutic Activites Therapeutic Exercise/Strengthening Transfer Training TREATMENT PLAN: Frequency/Duration: 3 times a week for duration of hospital stay Rehabilitation Potential For Stated Goals: Good REHAB RECOMMENDATIONS (at time of discharge pending progress):   
Placement: It is my opinion, based on this patient's performance to date, that Ms. Flor Keller may benefit from participating in 1-2 additional therapy sessions in order to continue to assess for rehab potential and then make recommendation for disposition at discharge. Equipment:  
None at this time HISTORY:  
History of Present Injury/Illness (Reason for Referral): 
sepsis Past Medical History/Comorbidities: Ms. Seda Graham  has a past medical history of Acute cystitis without hematuria (1/30/2019), CAD (coronary artery disease), DM2 (diabetes mellitus, type 2) ([address] Gout, HLD (hyperlipidemia), HTN (hypertension), and Peripheral neuropathy. Ms. Seda Graham  has a past surgical history that includes hx tubal ligation; hx coronary artery bypass graft; pr cardiac surg procedure unlist; and hx cholecystectomy. Social History/Living Environment:  
Home Environment: Apartment # Steps to Enter: 0 One/Two Story Residence: One story Living Alone: Yes Support Systems: Family member(s), Friends \ neighbors Patient Expects to be Discharged to[de-identified] Unknown Current DME Used/Available at Home: juanita Sigala Juliane Dills, straight Prior Level of Function/Work/Activity: 
Lives in senior apartment alone and PRN [facility]. One recent fall reported. Number of Personal Factors/Comorbidities that affect the Plan of Care: 1-2: MODERATE COMPLEXITY EXAMINATION:  
Most Recent Physical Functioning:  
Gross Assessment: 
AROM: Within functional limits Strength: Generally decreased, functional(B hip flexion 4-/5) Posture: 
Posture (WDL): Exceptions to [facility] Posture Assessment: Rounded shoulders Balance: 
Sitting: Intact Standing: Impaired Standing - Static: Fair Standing - Dynamic : Fair(-) Bed Mobility: 
Supine to Sit: Stand-by assistance Scooting: Stand-by assistance Wheelchair Mobility: 
  
Transfers: 
Sit to Stand: Minimum assistance Stand to Sit: Contact guard assistance Bed to Chair: Contact guard assistance;Minimum assistance Gait: 
  
Speed/Noemi: Slow;Shuffled Step Length: Right shortened;Left shortened Gait Abnormalities: Decreased step clearance;Trunk sway increased; Shuffling gait Distance (ft): 20 Feet (ft) Assistive Device: Walker, rolling;Gait belt Ambulation - Level of Assistance: Contact guard assistance;Minimal assistance Body Structures Involved: Metabolic Endocrine Muscles Body Functions Affected: 
Neuromusculoskeletal 
Movement Related Metobolic/Endocrine Activities and Participation Affected: 
General Tasks and Demands Mobility Self Care Domestic Life Community, Social and [place] Number of elements that affect the Plan of Care: 4+: HIGH COMPLEXITY CLINICAL PRESENTATION:  
Presentation: Stable and uncomplicated: LOW COMPLEXITY CLINICAL DECISION MAKING:  
[facility] AM-PAC 6 Clicks Basic Mobility Inpatient Short Form How much difficulty does the patient currently have. .. Unable A Lot A Little None 1. Turning over in bed (including adjusting bedclothes, sheets and blankets)? ? 1   ? 2   ? 3   ? 4  
2. Sitting down on and standing up from a chair with arms ( e.g., wheelchair, bedside commode, etc.)   ? 1   ? 2   ? 3   ? 4  
3. Moving from lying on back to sitting on the side of the bed?   ? 1   ? 2   ? 3   ? 4 How much help from another person does the patient currently need. .. Total A Lot A Little None 4. Moving to and from a bed to a chair (including a wheelchair)? ? 1   ? 2   ? 3   ? 4  
5. Need to walk in hospital room? ? 1   ? 2   ? 3   ? 4  
6. Climbing 3-5 steps with a railing? ? 1   ? 2   ? 3   ? 4  
© 2007, Trustees of [facility], under license to [employer]. All rights reserved Score:  Initial: 19 Most Recent: X (Date: -- ) Interpretation of Tool:  Represents activities that are increasingly more difficult (i.e. Bed mobility, Transfers, Gait). Medical Necessity:    
Patient demonstrates good 
 rehab potential due to higher previous functional level. Reason for Services/Other Comments: Patient continues to require skilled intervention due to functional mobility and balance. .  
Use of outcome tool(s) and clinical judgement create a POC that gives a: Clear prediction of patient's progress: LOW COMPLEXITY  
  
 
 
 
TREATMENT:  
(In addition to Assessment/Re-Assessment sessions the following treatments were rendered) Pre-treatment Symptoms/Complaints:  No complaints Pain: Initial:  
Pain Intensity 1: 0  Post Session:  0 Therapeutic Activity: (    8 minutes): Therapeutic activities including Chair transfers, seated exercises, Ambulation on level ground and standing activities  to improve mobility, strength, balance and coordination. Required minimal visual, verbal, and safety awareness cuing   to promote static and dynamic balance in standing and proper use of RW for transfer/ambulation . Date: 
8/12/19 Date: 
 Date: Activity/Exercise Parameters Parameters Parameters Marching 1 x 15 B    
LAQ 1 x 10 B APs 1 x 20 B Braces/Orthotics/Lines/Etc:  
coyle catheter O2 Device: Nasal cannula Treatment/Session Assessment:   
Response to Treatment:  Tolerated well with no complaints. Interdisciplinary Collaboration:  
Physical Therapist 
Registered Nurse Certified Nursing Assistant/Patient Care Technician After treatment position/precautions:  
Up in chair Bed/Chair-wheels locked Bed in low position Call light within reach RN notified Compliance with Program/Exercises: Will assess as treatment progresses Recommendations/Intent for next treatment session: \"Next visit will focus on advancements to more challenging activities and reduction in assistance provided\". Total Treatment Duration: PT Patient Time In/Time Out Time In: 1119 Time Out: 1145 Climmie Comfort, PT, DPT

## 2019-08-12 NOTE — PROGRESS NOTES
[facility] Progress Note Admit Date:  8/8/2019 Today's Date:  8/12/2019 CC:  Elevated liver chemistry Subjective:  
 
Patient transferred from ICU to floor overnight. Feeling better. No abdominal pain. To work with PT today. Medications:  
Current Facility-Administered Medications Medication Dose Route Frequency  metoprolol tartrate (LOPRESSOR) tablet 12.5 mg  12.5 mg Oral Q12H  
 [START ON 8/13/2019] vancomycin (VANCOCIN) 1,250 mg in dextrose 5% 250 mL IVPB  1,250 mg IntraVENous Q24H  
 losartan (COZAAR) tablet 100 mg  100 mg Oral DAILY  meropenem (MERREM) 500 mg in 0.9% sodium chloride (MBP/ADV) 50 mL  500 mg IntraVENous Q8H  Vancomycin Trough Level Reminder   Other ONCE  
 lip protectant (BLISTEX) ointment 1 Each  1 Each Topical PRN  
 0.9% sodium chloride infusion 250 mL  250 mL IntraVENous PRN  
 insulin lispro (HUMALOG) injection   SubCUTAneous AC&HS  
 0.9% sodium chloride infusion 250 mL  250 mL IntraVENous PRN  
 insulin glargine (LANTUS) injection 50 Units  50 Units SubCUTAneous QHS  sodium chloride (NS) flush 5-10 mL  5-10 mL IntraVENous PRN  
 0.9% sodium chloride infusion 250 mL  250 mL IntraVENous PRN  
 sodium chloride (NS) flush 5-40 mL  5-40 mL IntraVENous Q8H  
 sodium chloride (NS) flush 5-40 mL  5-40 mL IntraVENous PRN  
 ondansetron (ZOFRAN) injection 4 mg  4 mg IntraVENous Q4H PRN  pantoprazole (PROTONIX) 40 mg in sodium chloride 0.9% 10 mL injection  40 mg IntraVENous Q24H  
 dextrose 40% (GLUTOSE) oral gel 1 Tube  15 g Oral PRN  
 glucagon (GLUCAGEN) injection 1 mg  1 mg IntraMUSCular PRN  
 dextrose (D50W) injection syrg 12.5-25 g  25-50 mL IntraVENous PRN Review of Systems: ROS was obtained, with pertinent positives as listed above. No chest pain or SOB. Diet:  Diabetic regular Objective:  
Vitals: 
Visit Vitals /72 Pulse 61 Temp 98 °F (36.7 °C) Resp 17 Ht 5' 3\" (1.6 m) Wt 91.7 kg (202 lb 3.2 oz) SpO2 98% BMI 35.82 kg/m² Intake/Output: 
08/12 0701 - 08/12 1900 In: 240 [P.O.:240] Out: -  
08/10 1901 - 08/12 0700 In: 910 [P.O.:860; I.V.:50] Out: 4285 [Urine:2595] Exam: 
General appearance: alert, cooperative, no distress Lungs: clear to auscultation bilaterally anteriorly Heart: regular rate and rhythm Abdomen: soft, non-tender. Bowel sounds normal. No masses, no organomegaly Extremities: extremities normal, atraumatic, no cyanosis or edema Neuro:  alert and oriented Data Review (Labs):   
Recent Labs 08/11/19 
3052 08/10/19 2015 08/10/19 
0341 WBC 12.2*  --  15.2* HGB 9.0* 9.4* 6.4* HCT 27.6* 28.9* 19.1*  
*  --  123* MCV 91.7  --  93.2 *  --  146*  
K 4.5  --  4.5  
*  --  116* CO2 24  --  23 BUN 42*  --  86* CREA 1.38*  --  1.95* CA 7.9*  --  8.0*  
*  --  435* *  --  181* SGOT 136*  --  495* *  --  986* TBILI 0.5  --  0.4 ALB 2.1*  --  2.1*  
TP 4.9*  --  4.6* PTP  --   --  15.4* INR  --   --  1.2 Endoscopic Retrograde Cholangiopancreatogram  
DATE of PROCEDURE: 8/10/2019  
POSTPROCEDURE DIAGNOSIS: 
1. Possible cholangitis MEDICATIONS ADMINISTERED:  GETA  
INSTRUMENT: [id number]  
PROCEDURE:  After obtaining informed consent, the patient was placed in prone position on the fluoroscopy table. The patient was then sedated. The endoscope was passed under indirect technique to the oropharynx and gently passed into the esophagus. The endoscope was passed to the ampulla. Limited views of the stomach and duodenum were obtained. After the procedure, the patient was taken to the recovery area in stable condition.  
Ampulla: The ampulla was located in the expected position and was edematous and bulging into the second portion of the duodenum.  There was evidence of prior sphincterotomy with no active bleeding.   
Cannulation: Cannulation performed on first attempt with the short nose traction sphincterotome preloaded with a 0.035 inch guidewire which was used for deep cannulation of the biliary tree. Contrast was injected resulting in complete opacification.   
Cholangiogram: 
Initial cholangiogram demonstrated a dilated CBD with apparent stenosis in the distal CBD, likely from the edematous ampulla. After cannulation, when the tome was lifted, thick black bile with small wisps of white pus were seen.   
Interventions:  
The CBD was swept multiple times with the 12 mm biliary extraction balloon, producing a thick black bile/sludge but no stones. There was no evidence of contrast extravasation. The intrahepatic biliary tree appeared normal. Final sweeps were clear and produced clear green bile.   
The bile duct was observed under fluoroscopy, and contrast did not appear to drain. There appeared to be a smooth cutoff in the distal CBD as seen on image 5, likely from the edematous ampulla.   
Next, one 10 Fr x 9 cm plastic biliary stent (7 cm not available) was placed in the common bile duct. Green bile was seen flowing through the stent. The stent was placed in good position.    
Estimated Blood Loss: 0 cc-min  
ASSESSMENT: 
1. Possible Cholangitis 2. Ampullary stenosis and edema 3. Placement of 10 Fr biliary stent   
PLAN: 
1. Follow up with inpatient team  
2. Follow LFTs 3. Continue antibiotic therapy, follow microbiology culture data 4. If she declines clinically, consider urinary source as she has history of ESBL E.coli in the urine 5. Repeat ERCP in 4-6 weeks for stent management  
  
 
 
 
 
Assessment:  
 
Active Problems: 
  Uncontrolled type 2 diabetes mellitus with hyperglycemia ([address] (6/15/2015) Chronic kidney disease, stage III (moderate) (HCC) (6/15/2015) Acute renal failure (ARF) ([address] (8/8/2019) Elevated liver function tests (8/8/2019) Anemia (8/8/2019) Right lower quadrant abdominal pain (8/8/2019) Gram-negative bacteremia (8/9/2019) Acute pancreatitis (8/12/2019) 79 yo female presented in septic shock 2 weeks after ERCP, with labs suggesting ischemic hepatitis, pancreatitis with PRATIMA. Non contrast CT unrevealing, ultrasound with dopplers ruled out mesenteric vascular clot. She has bacteremia and ESBL-producing E. Coli in urine culture from 8/8 but UA generally bland on admission and no dysuria.  ERCP 8/10 to evaluate and treat for possible cholangitis with stent placed. Abx broadened to cover for ESBL. Plan: 1. Remains on Vancomycin, Merrem 2. WBC improving (now 12.2) 3. Liver chemistry improving 4. Repeat ERCP in 4-6 weeks for stent removal 
 
Patient is seen and examined in collaboration with Dr. Gela Blood. Assessment and plan as per Dr. Gela Blood.  
Lena Arzola NP

## 2019-08-12 NOTE — PROGRESS NOTES
[facility] OUTREACH NURSE PROGRESS REPORT SUBJECTIVE: Called to assess patient secondary to transfer from ICU. MEWS Score: 1 (08/11/19 1955) Vitals:  
 08/11/19 1322 08/11/19 1506 08/11/19 1535 08/11/19 1955 BP: 146/67 150/68  152/67 Pulse: 75 73  64 Resp: 20 20  18 Temp: 98.1 °F (36.7 °C) 98.5 °F (36.9 °C)  97.3 °F (36.3 °C) SpO2: 97% 99% 98% 99% Weight:      
Height:      
  
 
LAB DATA: 
 
Recent Labs 08/11/19 
1137 08/10/19 
8808 08/09/19 
6425 08/09/19 
0012 * 146* 146* 143  
K 4.5 4.5 4.9 4.6 * 116* 115* 110* CO2 24 23 25 24 AGAP 7 7 6* 9 * 435* 99 286* BUN 42* 86* 117* 127* CREA 1.38* 1.95* 2.19* 2.47* GFRAA 47* 32* 28* 24* GFRNA 39* 26* 23* 20* CA 7.9* 8.0* 7.7* 8.0*  
MG  --   --  2.4 2.4 PHOS  --   --  2.4  --   
ALB 2.1* 2.1* 2.2*  --   
TP 4.9* 4.6* 4.8*  --   
GLOB 2.8 2.5 2.6  --   
AGRAT 0.8* 0.8* 0.8*  --   
* 986* 1,636*  --   
  
 
Recent Labs 08/11/19 
1025 08/10/19 2015 08/10/19 
0341 08/09/19 
0249 WBC 12.2*  --  15.2* 21.5* HGB 9.0* 9.4* 6.4* 7.6* HCT 27.6* 28.9* 19.1* 23.2*  
*  --  123* 123* OBJECTIVE: On arrival to room, I found patient to be resting in bed. Pain Assessment Pain Intensity 1: 0 (08/11/19 1940) Pain Location 1: Abdomen, Leg 
Pain Intervention(s) 1: Position, Nurse notified Patient Stated Pain Goal: 0 
 
  
ASSESSMENT:  Patient alert, denies any pain. No visible signs of distress. Breathing even and unlabored on 2L NC, O2 sat 100%. PLAN:  Will continue to follow per outreach protocol.

## 2019-08-12 NOTE — PROGRESS NOTES
Bedside shift report given to [address] [place] [address], [address], FAY. Respirations regular rate & rhythm on 2L oxygen via nasal cannula. No s/sx of distress.

## 2019-08-12 NOTE — PROGRESS NOTES
Bedside shift received from Mik Jacobs, [facility]. Respirations regular rate & rhythm, on 2L oxygen via nasal cannula. Clifton draining/patent; no abnormalities noted. No s/sx of distress. Resting quietly in bed, talking on the phone. Bed in low & locked position. Call light and personal belongings within reach.

## 2019-08-12 NOTE — INTERDISCIPLINARY ROUNDS
Interdisciplinary team rounds were held 8/12/2019 with the following team members:Care Management, Physical Therapy, Physician and Clinical Coordinator and the patient. Plan of care discussed. See clinical pathway and/or care plan for interventions and desired outcomes.

## 2019-08-12 NOTE — PROGRESS NOTES
Pt in bed resting with eyes closed at this time. Pt alert and oriented times 3 at this time. No distress noted at this time. Respirations even and unlabored on 2 L NC. Pt denies pain at this time. Clifton intact and draining appropriately. Both L hand IV's clean dry and intact with no signs of infection noted. Pt instructed to call for assistance if needed. Call light in place. Door open. Will continue to monitor.

## 2019-08-12 NOTE — PROGRESS NOTES
Hospitalist Progress Note 2019 Admit Date: 2019 11:57 AM  
NAME: Erlinda Rubio :  1940 MRN:  [id number] Attending: George Lund MD 
PCP:  Xavier Diggs MD 
 
SUBJECTIVE:  
 
Erlinda Rubio is a 78years old female with hx of CABG, HTN, HLD, CKD-3, s/p cholecystectomy, GERD admitted on 19 for sepsis, acute pancreatitis, ischemic/shock liver, lactic acidosis, PRATIMA, transaminits. Pt had a recent ERCP on  for dilated CBD with only small stone. Pt also has ESBL E.coli and Enterococcus in urine culture and GNR on blood culture. Pt likely developed post ERCP pancreatitis and possible cholangitis. Pt had repeat ERCP on 8/10 with placement of CBD stent. : 
Pt reports feeling well. Denies any abdominal pain, nausea/vomiting, chills, fever, diarrhea. No chest pain, palpitations. Review of Systems negative with exception of pertinent positives noted above PHYSICAL EXAM  
 
 
Visit Vitals /72 Pulse 61 Temp 98 °F (36.7 °C) Resp 17 Ht 5' 3\" (1.6 m) Wt 91.7 kg (202 lb 3.2 oz) SpO2 98% BMI 35.82 kg/m² Temp (24hrs), Av °F (36.7 °C), Min:97.3 °F (36.3 °C), Max:98.5 °F (36.9 °C) Oxygen Therapy O2 Sat (%): 98 % (19 0749) Pulse via Oximetry: 79 beats per minute (19 1245) O2 Device: Nasal cannula (19 0640) O2 Flow Rate (L/min): 2 l/min (19 0640) FIO2 (%): 28 % (08/10/19 0750) Intake/Output Summary (Last 24 hours) at 2019 [facility] Last data filed at 2019 0430 Gross per 24 hour Intake 670 ml Output 1770 ml Net -1100 ml General: NAD, elderly, on 2ltrs NC Head:  Atraumatic Normocephalic. Eyes:  PERRLA, EOMI, Anicteric. ENT:  No discharges/lesions. Lungs:  CTA Bilaterally. CVS:  Regular rate and rhythm,  No murmur, rub, or gallop, No JVD, No lower   extremity edema. Abdomen: Soft, Non distended, Non tender, Positive bowel sounds. MSK:  No deformities, lesions, Spontaneously moves extremities. Neurologic:  GCS 15, no motor or sensory deficits Psychiatry:      No anxiety/Depression Skin:   No rash/lesions. Good skin turgor Heme/Lymph/Immune:  No petechiae, ecchymoses, overt signs of bleeding or    lymphadenopathy noted. Recent Results (from the past 24 hour(s)) GLUCOSE, POC Collection Time: 08/11/19  8:52 AM  
Result Value Ref Range Glucose (POC) 86 65 - 100 mg/dL PLEASE READ & DOCUMENT PPD TEST IN 48 HRS Collection Time: 08/11/19 11:53 AM  
Result Value Ref Range PPD Negative Negative  
 mm Induration 0 0 - 5 mm GLUCOSE, POC Collection Time: 08/11/19 11:57 AM  
Result Value Ref Range Glucose (POC) 118 (H) 65 - 100 mg/dL GLUCOSE, POC Collection Time: 08/11/19  3:59 PM  
Result Value Ref Range Glucose (POC) 246 (H) 65 - 100 mg/dL GLUCOSE, POC Collection Time: 08/11/19  8:14 PM  
Result Value Ref Range Glucose (POC) 266 (H) 65 - 100 mg/dL GLUCOSE, POC Collection Time: 08/12/19  6:03 AM  
Result Value Ref Range Glucose (POC) 205 (H) 65 - 100 mg/dL Imaging Kaylie Chapin Presume All diagnostic imaging personally reviewed by me. ASSESSMENT Hospital Problems as of 8/12/2019 Date Reviewed: 8/10/2019 Codes Class Noted - Resolved POA Gram-negative bacteremia ICD-10-CM: R78.81 ICD-9-CM: 790.7, 041.85  8/9/2019 - Present Unknown * (Principal) Septic shock ([facility] [address] ICD-10-CM: A41.9, R65.21 ICD-9-CM: 038.9, 785.52, 995.92  8/8/2019 - Present Yes Acute renal failure (ARF) (HCC) ICD-10-CM: N17.9 ICD-9-CM: 584.9  8/8/2019 - Present Yes Elevated liver function tests ICD-10-CM: R94.5 ICD-9-CM: 790.6  8/8/2019 - Present Yes Anemia ICD-10-CM: D64.9 ICD-9-CM: 285.9  8/8/2019 - Present Yes Right lower quadrant abdominal pain ICD-10-CM: R10.31 ICD-9-CM: 789.03  8/8/2019 - Present Unknown Uncontrolled type 2 diabetes mellitus with hyperglycemia (HCC) (Chronic) ICD-10-CM: E11.65 ICD-9-CM: 250.02  6/15/2015 - Present Yes Chronic kidney disease, stage III (moderate) (HCC) (Chronic) ICD-10-CM: N18.3 ICD-9-CM: 585.3  6/15/2015 - Present Yes Plan: 
- repeat blood cultures today ESBL E.coli and Enterococcus in Urine and blood culture from 8/8/19 Continue IV Merrem and Vancomycin Will consult ID to finalize duration and appropriate antibiotic coverage. 
- hypernatremia: fluids changed to D5W, check daily BMP 
- sepsis: resolving, hemodynamically stable - PRATIMA: resolving, Cr 1.38 now. Continuously improving Trend daily BMP 
- Transamnitis with shock liver: liver enzymes are normalizing 
- pancreatitis: resolving, no abdominal pain, nausea/vomiting. Pt is tolerating diet. - GI recommends ERCP in 4-6 weeks for stent removal 
- PT/OT eval 
- switch PPI from IV to oral 
 
DVT Prophylaxis: SCD's Dispo: pending, will be discharged home in next 1-2 days, home with HHA/PT/OT Tommie Hastings MD

## 2019-08-12 NOTE — PROGRESS NOTES
Problem: Falls - Risk of 
Goal: *Absence of Falls Description Document Javier Waters Fall Risk and appropriate interventions in the flowsheet. Outcome: Progressing Towards Goal 
Note:  
Fall Risk Interventions: 
Mobility Interventions: OT consult for ADLs Medication Interventions: Bed/chair exit alarm Elimination Interventions: Call light in reach History of Falls Interventions: Bed/chair exit alarm, Room close to nurse's station Problem: Pressure Injury - Risk of 
Goal: *Prevention of pressure injury Description Document Ashu Scale and appropriate interventions in the flowsheet. Outcome: Progressing Towards Goal 
Note:  
Pressure Injury Interventions: 
Sensory Interventions: Assess changes in LOC, Check visual cues for pain, Keep linens dry and wrinkle-free, Maintain/enhance activity level, Minimize linen layers, Monitor skin under medical devices, Pad between skin to skin, Turn and reposition approx. every two hours (pillows and wedges if needed), Use 30-degree side-lying position Moisture Interventions: Absorbent underpads Activity Interventions: Increase time out of bed Mobility Interventions: Float heels, Pressure redistribution bed/mattress (bed type), PT/OT evaluation Nutrition Interventions: Document food/fluid/supplement intake Friction and Shear Interventions: Apply protective barrier, creams and emollients, Foam dressings/transparent film/skin sealants, Lift sheet

## 2019-08-12 NOTE — CONSULTS
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  
· Continue Vancomycin and Merrem, follow GN ID/sens · Follow repeat [facility] · Check TTE 
· Unsure if she had a UTI, given clinical improvement without treatment directed at ESBL Anti-infectives:  
· merrem 8/11- · Vanc 8/8- Subjective:  
Date of Consultation:  August 12, 2019 Referring Physician: Dr Winston Blanco Patient is a 78 y.o. female who underwent an elective ERCP on 7/24/19 for elevated LFTs, with findings of a small stone. She reports since that procedure she has felt unwell, symptoms progressing to 10/10 RLQ abdominal pain, nausea, vomiting. Upon admission was in septic shock, WBC 32k, LA 3.2, PCT 9.5, Cr 3.35, lipase 2500, AST 2586, ALT 4996. She needed pressors, fluids and blood transfusion. BC with E.faecalis and GNR pending, UA benign Ucx with ESBL E.coli and vanc-S E.faecium. CT abdomen and pelvis with possible SB enteritis, and chest bibasilar atelectasis, abdominal arterial duplex negative, seen by GI undergoing repeat ERCP on 8/10/10 for cholangitis, ampullary stenosis and had a stent placement. WBC, Cr, LFTs all improved. Was on Vanc, CTX, Flagyl-CTX changed to merrem today. ID consulted to assist with treatment recommendations. She reports feeling better overall, resolution in abdominal pain, no fever, chills, sweats, nausea or diarrhea. Tolerating antbx, no implanted HW, denies dysuria currently or PTA. Patient Active Problem List  
Diagnosis Code  Coronary artery disease involving coronary bypass graft of native heart without angina pectoris I25.810  
 Uncontrolled type 2 diabetes mellitus with hyperglycemia ([facility]) E11.65  
 Essential hypertension I10  
 HLD (hyperlipidemia) E78.5  Chronic kidney disease, stage III (moderate) ([facility]) N18.3  Osteopenia M85.80  Vitamin D deficiency E55.9  Gastroesophageal reflux disease without esophagitis K21.9  Other allergic rhinitis J30.89  Peripheral neuropathy G62.9  
 Primary osteoarthritis involving multiple joints M15.0  Insomnia G47.00  RLS (restless legs syndrome) G25.81  
 Acute renal failure (ARF) ([facility]) N17.9  Elevated liver function tests R94.5  Anemia D64.9  Right lower quadrant abdominal pain R10.31  
 Gram-negative bacteremia R78.81  
 Acute pancreatitis K85.90 Past Medical History:  
Diagnosis Date  Acute cystitis without hematuria 1/30/2019  CAD (coronary artery disease)  DM2 (diabetes mellitus, type 2) ([facility] [address]  Gout  HLD (hyperlipidemia)  HTN (hypertension)  Peripheral neuropathy Family History Problem Relation Age of Onset  Hypertension Mother  Cancer Mother  Diabetes Mother  Heart Disease Mother Social History Tobacco Use  Smoking status: Never Smoker  Smokeless tobacco: Never Used Substance Use Topics  Alcohol use: Yes Comment: socially Past Surgical History:  
Procedure Laterality Date  CARDIAC SURG PROCEDURE UNLIST    
 stents x 3 2009  HX CHOLECYSTECTOMY jennifer in 1964  HX CORONARY ARTERY BYPASS GRAFT    
 x3  
 HX TUBAL LIGATION Prior to Admission medications Medication Sig Start Date End Date Taking? Authorizing Provider  
clopidogrel (PLAVIX) 75 mg tab Take 75 mg by mouth. Yes Provider, Historical  
insulin glargine (LANTUS) 100 unit/mL injection 50 Units by SubCUTAneous route nightly.  2/6/19  Yes Gin Dewitt MD  
 insulin lispro (HUMALOG) 100 unit/mL injection Per SSI 19  Yes Nancy Dewitt MD  
metoprolol (LOPRESSOR) 25 mg tablet Take 1 Tab by mouth two (2) times a day. 6/15/15  Yes Wes Duffy MD  
Blood-Glucose Meter [facility] ULTRAMINI) monitoring kit Use as directed 4/14/15  Yes Wes Duffy MD  
glucose blood VI test strips [facility] ULTRA TEST) strip Test 4 times daily as directed 4/14/15  Yes Wes Duffy MD  
Lancets [facility] ULTRASOFT LANCETS) misc Test 4 times daily as directed 4/14/15  Yes Wes Duffy MD  
losartan (COZAAR) 100 mg tablet Take  by mouth daily. 1/12/15  Yes Provider, Historical  
furosemide (LASIX) 20 mg tablet Take  by mouth two (2) times a day. Yes Provider, Historical  
Omeprazole delayed release (PRILOSEC D/R) 20 mg tablet Take 1 Tab by mouth daily. 3/9/15  Yes Wes Duffy MD  
rosuvastatin (CRESTOR) 20 mg tablet Take 1 Tab by mouth nightly. 3/9/15  Yes Wes Duffy MD  
gabapentin (NEURONTIN) 100 mg capsule Take 1 Cap by mouth three (3) times daily. 3/9/15  Yes Wes Duffy MD  
colchicine (COLCRYS) 0.6 mg tablet Take 0.6 mg by mouth three (3) times daily as needed. Yes Provider, Historical  
rOPINIRole (REQUIP) 0.5 mg tablet Take 1 Tab by mouth nightly as needed. 14  Yes Wes Duffy MD  
 
 
Allergies Allergen Reactions  Sulfa (Sulfonamide Antibiotics) Swelling Review of Systems:  A comprehensive review of systems was negative except for that written in the History of Present Illness. Objective:  
 
Visit Vitals /78 Pulse (!) 56 Temp 98.6 °F (37 °C) Resp 18 Ht 5' 3\" (1.6 m) Wt 91.7 kg (202 lb 3.2 oz) SpO2 93% BMI 35.82 kg/m² Temp (24hrs), Av.1 °F (36.7 °C), Min:97.3 °F (36.3 °C), Max:98.6 °F (37 °C) Lines:  Peripheral IV:   Left ar x2 intact Physical Exam:   
General:  Alert, cooperative, Obese Eyes:  Sclera anicteric. Pupils equally round and reactive to light. Mouth/Throat: Mucous membranes normal, oral pharynx clear Neck: Supple Lungs:   Clear to auscultation bilaterally, good effort CV:  Regular rate and rhythm,no murmur, click, rub or gallop Abdomen:   Soft, non-tender. bowel sounds normal. non-distended Extremities: No cyanosis or edema Skin: Skin color, texture, turgor normal. no acute rash or lesions Lymph nodes: Cervical and supraclavicular normal  
Musculoskeletal: No swelling or deformity Lines/Devices:  Intact, no erythema, drainage or tenderness Psych: Alert and oriented, normal mood affect given the setting Data Review: CBC: 
Recent Labs 08/11/19 
8249 08/10/19 2015 08/10/19 
0341 WBC 12.2*  --  15.2* HGB 9.0* 9.4* 6.4* HCT 27.6* 28.9* 19.1*  
*  --  123* BMP: 
Recent Labs 08/11/19 
1389 08/10/19 
4521 CREA 1.38* 1.95* BUN 42* 86* * 146*  
K 4.5 4.5  
* 116* CO2 24 23 AGAP 7 7 * 435* LFTS: 
Recent Labs 08/11/19 
9766 08/10/19 
5612 TBILI 0.5 0.4 * 986* SGOT 136* 495* * 181* TP 4.9* 4.6* ALB 2.1* 2.1* Microbiology:  
 
All Micro Results Procedure Component Value Units Date/Time CULTURE, BLOOD [[id number]] Collected:  08/12/19 1116 Order Status:  Completed Specimen:  Blood Updated:  08/12/19 1224 CULTURE, BLOOD [[id number]] Collected:  08/12/19 1109 Order Status:  Completed Specimen:  Blood Updated:  08/12/19 1224 CULTURE, BLOOD [[id number]]  (Abnormal)  (Susceptibility) Collected:  08/08/19 1228 Order Status:  Completed Specimen:  Blood Updated:  08/12/19 0719 Special Requests: --     
  RIGHT Antecubital 
  
  GRAM STAIN GRAM POSITIVE COCCI     
   GRAM NEGATIVE RODS ANAEROBIC BOTTLE POSITIVE RESULTS VERIFIED, PHONED TO AND READ BACK BY FAY LAM @7412 8/9/19 MM   RESULTS VERIFIED, PHONED TO AND READ BACK BY NETTIE CANTU RN ON 8/9/19 @0750, TA  
     
 Culture result:    
  ENTEROCOCCUS FAECALIS GROUP D  
     
   GRAM NEGATIVE RODS     
      
  ENTEROCOCCUS SPECIES 
DETECTED 
ESCHERICHIA COLI 
DETECTED Test Performed by Multiplex PCR RESULTS VERIFIED, PHONED TO AND READ BACK BY 
[address] ([facility]) RN AT 3112 19 TA IDENTIFICATION AND SUSCEPTIBILITY TO FOLLOW CULTURE, URINE [[id number]]  (Abnormal)  (Susceptibility) Collected:  19 1252 Order Status:  Completed Specimen:  Urine from [place] Specimen Updated:  19 6060 Special Requests: NOT GRAY TOP TUBE Culture result:    
  >100,000 COLONIES/mL ESCHERICHIA COLI ** (EXTENDED SPECTRUM BETA LACTAMASE ) **  
     
      
  10,000 to 50,000 COLONIES/mL ENTEROCOCCUS FAECIUM GROUP D  
     
   PATIENT IS A KNOWN ESBL FOSFOMYCON SUSCEPTIBILITY TO FOLLOW CULTURE, BLOOD [[id number]]  (Abnormal) Collected:  19 1232 Order Status:  Completed Specimen:  Blood Updated:  08/10/19 2130 Special Requests: --     
  LEFT 
FOREARM 
  
  GRAM STAIN GRAM POSITIVE COCCI     
   GRAM NEGATIVE RODS AEROBIC AND ANAEROBIC BOTTLES RESULTS VERIFIED, PHONED TO AND READ BACK BY FAY LAM @0513 19 MM RESULTS VERIFIED, PHONED TO AND READ BACK BY [address] ([facility] [address] RN ON 19 @0750, TA  
     
  Culture result:    
  PRESUMPTIVE ENTEROCOCCUS SPECIES  
     
   GRAM NEGATIVE RODS REFER TO ACC [id number] FOR ID AND SUSCEPTIBILITY Imagin/10/19 ERCP IMPRESSION: Biliary intervention with subsequent stent placement in satisfactory 
position. Please refer to the procedural report for further description and 
detail. 19 abdominal duplex Impression: Portal vein is patent. The celiac, proper hepatic, splenic, and 
superior mesenteric arteries are patent without evidence of significant stenosis 
greater than 50%. The portal-splenic confluence is patent.  
 
19 CXR 
 IMPRESSION: Interstitial pulmonary edema and left lower lobe atelectasis or 
pneumonia improved. Signed By: Sonia Jones NP August 12, 2019

## 2019-08-12 NOTE — PROGRESS NOTES
Vidal Franco Admission Date: 8/8/2019 Daily Progress Note: 8/12/2019 Vidal Franco is an 78yoF with PMH of CABG, HTN, HLD, Stage III CKD, s/p cholecystectomy, GERD who underwent ERCP on  7/24 for dilated CBD with only small stone and no clear etiology identified. She presented to ER 2 weeks later with 2 days of acute symptoms of abd pain, anorexia, nausea progressing to RLQ pain which was severe. Upon presentation she was hypotensive with elevated lipase >2500, lactic acidosis, PRATIMA, transaminitis with concern for ischemia/shock liver vs cholangitis. ERCP with biliary sludge and stent placed to CBD. Stool + occult blood. ESBL-producing E. Coli in urine culture from 8/8 but UA generally bland on admission and no dysuria. Clinically improving on ceftriaxone/flagyl. Blood cultures with enterococcus and e. Coli.   
  
  
Subjective:  
 
Transferred to the floor from ICU. abx broadened. Felling much better, eating, watching TV Review of Systems Respiratory: negative for cough, hemoptysis, wheezing, dyspnea on exertion or emphysema Current Facility-Administered Medications Medication Dose Route Frequency  losartan (COZAAR) tablet 100 mg  100 mg Oral DAILY  meropenem (MERREM) 500 mg in 0.9% sodium chloride (MBP/ADV) 50 mL  500 mg IntraVENous Q8H  Vancomycin Trough Level Reminder   Other ONCE  
 lip protectant (BLISTEX) ointment 1 Each  1 Each Topical PRN  
 0.9% sodium chloride infusion 250 mL  250 mL IntraVENous PRN  
 insulin lispro (HUMALOG) injection   SubCUTAneous AC&HS  vancomycin (VANCOCIN) 1,000 mg in 0.9% sodium chloride (MBP/ADV) 250 mL  1,000 mg IntraVENous Q24H  
 0.9% sodium chloride infusion 250 mL  250 mL IntraVENous PRN  
 insulin glargine (LANTUS) injection 50 Units  50 Units SubCUTAneous QHS  sodium chloride (NS) flush 5-10 mL  5-10 mL IntraVENous PRN  
 0.9% sodium chloride infusion 250 mL  250 mL IntraVENous PRN  
  sodium chloride (NS) flush 5-40 mL  5-40 mL IntraVENous Q8H  
 sodium chloride (NS) flush 5-40 mL  5-40 mL IntraVENous PRN  
 ondansetron (ZOFRAN) injection 4 mg  4 mg IntraVENous Q4H PRN  pantoprazole (PROTONIX) 40 mg in sodium chloride 0.9% 10 mL injection  40 mg IntraVENous Q24H  
 dextrose 40% (GLUTOSE) oral gel 1 Tube  15 g Oral PRN  
 glucagon (GLUCAGEN) injection 1 mg  1 mg IntraMUSCular PRN  
 dextrose (D50W) injection syrg 12.5-25 g  25-50 mL IntraVENous PRN Objective:  
 
Vitals:  
 08/11/19 1955 08/11/19 2355 08/12/19 0301 08/12/19 7151 BP: 152/67  147/73 160/72 Pulse: 64  (!) 58 61 Resp: 18  18 17 Temp: 97.3 °F (36.3 °C)  98.1 °F (36.7 °C) 98 °F (36.7 °C) SpO2: 99%  99% 98% Weight:  202 lb 3.2 oz (91.7 kg) Height:      
 
Intake and Output:  
08/10 1901 - 08/12 0700 In: 910 [P.O.:860; I.V.:50] Out: 2945 [Urine:2595] No intake/output data recorded. Physical Exam:         
Constitutional: the patient is well develop HEENT: Sclera clear, pupils equal, oral mucosa moist 
Lungs: CTA on 3-4 lpm 
Cardiovascular: RRR without M,G,R 
Abd/GI: soft and non-tender; with positive bowel sounds. Ext: warm without cyanosis. There is no lower leg edema. Musculoskeletal: moves all four extremities with equal strength Skin: no jaundice or rashes, no wounds Neuro: no gross neuro deficits Lines/Drains: IV, coyle Nutrition: ADA CHEST XRAY:  
 
LAB Recent Labs 08/11/19 
4712 08/10/19 2015 08/10/19 
0341 WBC 12.2*  --  15.2* HGB 9.0* 9.4* 6.4* HCT 27.6* 28.9* 19.1*  
*  --  123* Recent Labs 08/11/19 
2554 08/10/19 
6890 * 146*  
K 4.5 4.5  
* 116* CO2 24 23 * 435* BUN 42* 86* CREA 1.38* 1.95* INR  --  1.2 ABG:  No results found for: PH, PHI, PCO2, PCO2I, PO2, PO2I, HCO3, HCO3I, FIO2, FIO2I 
 
CXR:   
 
  
CT abd/pelvis: 1. The only potential etiology of sepsis is that there is some wall thickening suggested of more proximal small bowel loops in the left abdomen with mild 
adjacent mesenteric stranding which can suggest inflammation (i.e. enteritis). No free air or abnormal fluid collection is definitely seen on this limited 
noncontrast study. 2. Nonspecific basilar airspace changes of the lungs likely representing 
atelectasis given the distribution although pneumonia is not excluded if the 
patient has symptoms of pneumonia. 3. Gas within the urinary bladder although this very well may have been 
introduced with [place] catheter placement. This would be best further assessed 
with urinalysis if this has not been recently performed. 
  
Urine Special Requests: NOT GRAY TOP TUBE   Preliminary Culture result: Abnormal     Preliminary >100,000 COLONIES/mL ESCHERICHIA COLI ** (EXTENDED SPECTRUM BETA LACTAMASE ) **   
Culture result: Abnormal     Preliminary 10,000 to 50,000 COLONIES/mL ENTEROCOCCUS FAECIUM GROUP D Culture result: PATIENT IS A KNOWN ESBL    Preliminary Culture result:     
 
 
Blood GRAM STAIN ANAEROBIC BOTTLE POSITIVE    Preliminary GRAM STAIN    Preliminary RESULTS VERIFIED, PHONED TO AND READ BACK BY FAY LAM @0853 8/9/19 MM   
GRAM STAIN    Preliminary RESULTS VERIFIED, PHONED TO AND READ BACK BY NETTIE CANTU RN ON 8/9/19 @0750, TA   
Culture result: Abnormal     Preliminary ENTEROCOCCUS FAECALIS GROUP D Culture result: GRAM NEGATIVE RODSAbnormal     Preliminary Culture result: Abnormal      
 
 
 
 
Assessment:  
 
Patient Active Problem List  
Diagnosis Code  Coronary artery disease involving coronary bypass graft of native heart without angina pectoris I25.810  
 Uncontrolled type 2 diabetes mellitus with hyperglycemia ([facility]) E11.65  
 Essential hypertension I10  
 HLD (hyperlipidemia) E78.5  Chronic kidney disease, stage III (moderate) ([facility]) N18.3  Osteopenia M85.80  Vitamin D deficiency E55.9  Gastroesophageal reflux disease without esophagitis K21.9  Other allergic rhinitis J30.89  Peripheral neuropathy G62.9  
 Primary osteoarthritis involving multiple joints M15.0  Insomnia G47.00  RLS (restless legs syndrome) G25.81  Septic shock ([facility]) A41.9, R65.21  
 Acute renal failure (ARF) ([facility]) N17.9  Elevated liver function tests R94.5  Anemia D64.9  Right lower quadrant abdominal pain R10.31  
 Gram-negative bacteremia R78.81 Principal Problem: 
  Septic shock ([facility] [address] (8/8/2019) Resolved Uncontrolled type 2 diabetes mellitus with hyperglycemia ([facility] [address] (6/15/2015) Chronic kidney disease, stage III (moderate) ([facility]) (6/15/2015) Near baseline Acute renal failure (ARF) ([facility] [address] (8/8/2019) Improved Elevated liver function tests (8/8/2019) ERCP, S/p stent Anemia (8/8/2019) Stable, watch hgb PPI Right lower quadrant abdominal pain (8/8/2019) Improved Gram-negative bacteremia (8/9/2019) On merrem and vanc PLAN: 
-- F/U renal function and Na+. Stop IV fluids, increase oral intake water -- continue merrem and vancomycin (D 5 abx) -- watch BG levels -- PT/OT, OOB to chair -- wean oxygen 
--remove coyle once up Margret Freeman NP-C More than 50% of time documented was spent in face-to-face contact with the patient and in the care of the patient on the floor/unit where the patient is located. Lungs:  Clear, 2L NC Heart:  RRR with no Murmur/Rubs/Gallops Additional Comments:  Improving. Blood cultures repeated per primary. Agree with plan. No pulmonary/CCM issues identified as ongoing. Will sign off. Call with concerns. I have spoken with and examined the patient. I agree with the above assessment and plan as documented.  
 
Cheri Guzmán MD

## 2019-08-12 NOTE — PROGRESS NOTES
Pharmacokinetic Consult to Pharmacist 
 
Balwinder Kelton is a 78 y.o. female being treated for BSI/UTI with vancomycin and meropenem. Noted enterococcus in urine has different susceptibility than one in blood. Height: 5' 3\" (160 cm)  Weight: 91.7 kg (202 lb 3.2 oz) Lab Results Component Value Date/Time BUN 42 (H) 08/11/2019 06:05 AM  
 Creatinine 1.38 (H) 08/11/2019 06:05 AM  
 WBC 12.2 (H) 08/11/2019 06:05 AM  
 Procalcitonin 9.5 08/08/2019 12:28 PM  
 Lactic acid 2.0 08/09/2019 12:12 AM  
 Lactic Acid (POC) 4.51 (H) 08/08/2019 02:44 PM  
  
Estimated Creatinine Clearance: 34.4 mL/min (A) (based on SCr of 1.38 mg/dL (H)). Lab Results Component Value Date/Time Vancomycin,trough 13.0 08/12/2019 07:27 AM  
 Vancomycin, random 11.1 08/10/2019 03:41 AM  
 
 
Day 4 of vancomycin. Goal trough is 15-20. Trough subtherapeutic. Will change dose to 1.25g q24h and continue to monitor renal function. Pharmacy will continue to follow patient. Thank you, Barb Dalton, Pharm. D. 
PGY1 Pharmacy Resident

## 2019-08-12 NOTE — PROGRESS NOTES
Pt sitting up in chair at this time. Alert and oriented times 3. No distress noted at this time. Respirations even and unlabored on room air. Pt weaned off of O2 by respiratory. Dextrose 5% infusing @ 50 ml/hr in left wrist IV. Pt denies pain at this time. Pt instructed to call for assistance if needed. Call light in place. Door open. Will continue to monitor.

## 2019-08-12 NOTE — PROGRESS NOTES
Date of Outreach Update: 
Conner Servin was seen and assessed. Previous Outreach assessment has been reviewed. There have been no significant clinical changes since the completion of the last dated Outreach assessment. Patient resting in bed, no visible signs of distress. Breathing even and unlabored, on NC. Discussed with primary RN who has no concerns. Will continue to follow up per outreach protocol. Signed By:   Matilde Hendricks   August 12, 2019 5:13 AM

## 2019-08-12 NOTE — PROGRESS NOTES
Pt resting in chair at this time. Alert and oriented times 3. No distress noted at this time. Respirations even and unlabored on room air. Dextrose 5% infusing at 50 ml/hr. Will continue to monitor.

## 2019-08-13 LAB
ANION GAP SERPL CALC-SCNC: 4 MMOL/L (ref 7–16)
BACTERIA SPEC CULT: ABNORMAL
BUN SERPL-MCNC: 22 MG/DL (ref 8–23)
CALCIUM SERPL-MCNC: 8.1 MG/DL (ref 8.3–10.4)
CHLORIDE SERPL-SCNC: 111 MMOL/L (ref 98–107)
CO2 SERPL-SCNC: 26 MMOL/L (ref 21–32)
CREAT SERPL-MCNC: 1.2 MG/DL (ref 0.6–1)
GLUCOSE BLD STRIP.AUTO-MCNC: 161 MG/DL (ref 65–100)
GLUCOSE BLD STRIP.AUTO-MCNC: 163 MG/DL (ref 65–100)
GLUCOSE BLD STRIP.AUTO-MCNC: 168 MG/DL (ref 65–100)
GLUCOSE BLD STRIP.AUTO-MCNC: 230 MG/DL (ref 65–100)
GLUCOSE SERPL-MCNC: 186 MG/DL (ref 65–100)
GRAM STN SPEC: ABNORMAL
POTASSIUM SERPL-SCNC: 4.1 MMOL/L (ref 3.5–5.1)
SERVICE CMNT-IMP: ABNORMAL
SERVICE CMNT-IMP: ABNORMAL
SODIUM SERPL-SCNC: 141 MMOL/L (ref 136–145)

## 2019-08-13 PROCEDURE — 74011250636 HC RX REV CODE- 250/636: Performed by: INTERNAL MEDICINE

## 2019-08-13 PROCEDURE — 82962 GLUCOSE BLOOD TEST: CPT

## 2019-08-13 PROCEDURE — 74011000258 HC RX REV CODE- 258: Performed by: INTERNAL MEDICINE

## 2019-08-13 PROCEDURE — 97535 SELF CARE MNGMENT TRAINING: CPT

## 2019-08-13 PROCEDURE — 74011636637 HC RX REV CODE- 636/637: Performed by: INTERNAL MEDICINE

## 2019-08-13 PROCEDURE — 74011250637 HC RX REV CODE- 250/637: Performed by: INTERNAL MEDICINE

## 2019-08-13 PROCEDURE — 65270000029 HC RM PRIVATE

## 2019-08-13 PROCEDURE — 36415 COLL VENOUS BLD VENIPUNCTURE: CPT

## 2019-08-13 PROCEDURE — 80048 BASIC METABOLIC PNL TOTAL CA: CPT

## 2019-08-13 PROCEDURE — 74011000258 HC RX REV CODE- 258: Performed by: HOSPITALIST

## 2019-08-13 PROCEDURE — 74011250636 HC RX REV CODE- 250/636: Performed by: HOSPITALIST

## 2019-08-13 PROCEDURE — 93306 TTE W/DOPPLER COMPLETE: CPT

## 2019-08-13 PROCEDURE — 74011250637 HC RX REV CODE- 250/637: Performed by: HOSPITALIST

## 2019-08-13 PROCEDURE — 97166 OT EVAL MOD COMPLEX 45 MIN: CPT

## 2019-08-13 PROCEDURE — 97530 THERAPEUTIC ACTIVITIES: CPT

## 2019-08-13 RX ADMIN — LOSARTAN POTASSIUM 100 MG: 50 TABLET ORAL at 09:11

## 2019-08-13 RX ADMIN — PANTOPRAZOLE SODIUM 40 MG: 40 TABLET, DELAYED RELEASE ORAL at 06:16

## 2019-08-13 RX ADMIN — Medication 10 ML: at 13:45

## 2019-08-13 RX ADMIN — Medication 10 ML: at 21:31

## 2019-08-13 RX ADMIN — MEROPENEM 500 MG: 500 INJECTION, POWDER, FOR SOLUTION INTRAVENOUS at 02:07

## 2019-08-13 RX ADMIN — METOPROLOL TARTRATE 12.5 MG: 25 TABLET ORAL at 21:30

## 2019-08-13 RX ADMIN — INSULIN LISPRO 3 UNITS: 100 INJECTION, SOLUTION INTRAVENOUS; SUBCUTANEOUS at 18:03

## 2019-08-13 RX ADMIN — INSULIN GLARGINE 50 UNITS: 100 INJECTION, SOLUTION SUBCUTANEOUS at 21:31

## 2019-08-13 RX ADMIN — INSULIN LISPRO 6 UNITS: 100 INJECTION, SOLUTION INTRAVENOUS; SUBCUTANEOUS at 11:48

## 2019-08-13 RX ADMIN — MEROPENEM 500 MG: 500 INJECTION, POWDER, FOR SOLUTION INTRAVENOUS at 09:11

## 2019-08-13 RX ADMIN — PANTOPRAZOLE SODIUM 40 MG: 40 TABLET, DELAYED RELEASE ORAL at 18:03

## 2019-08-13 RX ADMIN — MEROPENEM 500 MG: 500 INJECTION, POWDER, FOR SOLUTION INTRAVENOUS at 18:02

## 2019-08-13 RX ADMIN — Medication 5 ML: at 06:16

## 2019-08-13 RX ADMIN — INSULIN LISPRO 3 UNITS: 100 INJECTION, SOLUTION INTRAVENOUS; SUBCUTANEOUS at 21:31

## 2019-08-13 RX ADMIN — INSULIN LISPRO 3 UNITS: 100 INJECTION, SOLUTION INTRAVENOUS; SUBCUTANEOUS at 06:15

## 2019-08-13 RX ADMIN — VANCOMYCIN HYDROCHLORIDE 1250 MG: 10 INJECTION, POWDER, LYOPHILIZED, FOR SOLUTION INTRAVENOUS at 09:11

## 2019-08-13 RX ADMIN — METOPROLOL TARTRATE 12.5 MG: 25 TABLET ORAL at 09:11

## 2019-08-13 NOTE — PROGRESS NOTES
Bedside shift received from [address] [place] [address]. Anna Goodwin, RN. Respirations regular rate & rhythm, on room air. No s/sx of distress. Resting quietly in chair, watching tv. Chair in low & locked position. Call light and personal belongings within reach.

## 2019-08-13 NOTE — PROGRESS NOTES
Pt is lying in bed with visitor at bedside. Pt voices no complaints or concerns at this time. Pt appears in no acute distress on RA. SBAR end of shift report given to oncoming RN.

## 2019-08-13 NOTE — PROGRESS NOTES
[facility] Respirations regular rate & rhythm on room air. No s/sx of distress.

## 2019-08-13 NOTE — PROGRESS NOTES
SB[facility] Pt stable. Assessment complete. Pt is lying in bed, resting quietly. Resp even, unlabored. Pt is alert, orient X 4. Pt appears in no acute distress at this time. Pt is on RA. Pt has SCD's ordered, but not in place at this time. Pt voices no complaints or concerns. Pt encouraged to call for assistance, call light in reach. Safety measures in place. Will continue to monitor

## 2019-08-13 NOTE — PROGRESS NOTES
Problem: Self Care Deficits Care Plan (Adult) Goal: *Acute Goals and Plan of Care (Insert Text) Description 1. Patient will complete total body bathing and dressing with supervision and adaptive equipment as needed. 2. Patient will complete toileting with supervision and adaptive equipment as needed. 3. Patient will tolerate 20 minutes of OT treatment with up to 2 rest breaks to increase activity tolerance for ADLs. 4. Patient will complete functional transfers with supervision and adaptive equipment as needed. 5. Patient will demonstrate modified independence with therapeutic exercise HEP to increase strength in BUEs for increased safety and independence with functional transfers. 6. Patient will complete functional mobility for ADLs with supervision and adaptive equipment as needed. Timeframe: 7 visits Outcome: Progressing Towards Goal 
  
OCCUPATIONAL THERAPY: Initial Assessment, Daily Note and PM 8/13/2019 INPATIENT: OT Visit Days: 1 Payor: Rashaun Patel / Plan: [employer] / Product Type: Managed Care Medicare /  
  
NAME/AGE/GENDER: Mike Guajardo is a 78 y.o. female PRIMARY DIAGNOSIS:  Septic shock ([address] [A41.9, R65.21] Septic shock ([address] [A41.9, R65.21] Septic shock ([address] Septic shock ([address] Procedure(s) (LRB): ENDOSCOPIC RETROGRADE CHOLANGIOPANCREATOGRAPHY (ERCP) (N/A) ENDOSCOPIC STONE EXTRACTION/BALLOON SWEEP (N/A) BILIARY STENT PLACEMENT (N/A) 3 Days Post-Op ICD-10: Treatment Diagnosis:  
 Generalized Muscle Weakness (M62.81) History of falling (Z91.81) Precautions/Allergies: 
  Fall precautions  Sulfa (sulfonamide antibiotics) ASSESSMENT:  
 
Ms. Tian Akers is a 78 y.o. female admitted with the above. At baseline pt lives alone and reports independence to supervision with ADLs, ambulation using rollator. Pt with low vision.  Aide comes for 4-5 hrs 5 days a week to supervise ADLs and provide transportation. Pt endorses 1 recent fall. Upon arrival pt alert and agreeable to OT evaluation and treatment. BUE assessment revealed AROM and strength generally decreased in BUEs. Pt completed bed mobility with SBA, demonstrates intact sitting balance, and dons socks with SBA. Treatment initiated to include functional transfers with CGA, ambulation with CGA/RW/cues for RW management, toilet transfer with CGA. Pt toileted with CGA for hygiene in standing and groomed in standing with CGA. Pt sat in chair with uncontrolled descent, educated on stand to sit technique and practiced x10 trials with CGA progressing to SBA. Pt left seated in chair with call bell within reach. Pt presents with deficits in strength, activity tolerance, balance, functional mobility and functional transfers, all of which negatively impact safety and independence with ADLs. Marilou Sims is currently functioning below baseline and would benefit from continued OT to increase safety and independence with ADLs. Will follow. This section established at most recent assessment PROBLEM LIST (Impairments causing functional limitations): 
Decreased Strength Decreased ADL/Functional Activities Decreased Transfer Abilities Decreased Ambulation Ability/Technique Decreased Balance Decreased Activity Tolerance Increased Fatigue Decreased [place] with Home Exercise Program 
 INTERVENTIONS PLANNED: (Benefits and precautions of occupational therapy have been discussed with the patient.) Activities of daily living training Adaptive equipment training Balance training Clothing management Donning&doffing training Hygiene training Neuromuscular re-eduation Re-evaluation Therapeutic activity Therapeutic exercise TREATMENT PLAN: Frequency/Duration: Follow patient 3x/week to address above goals. Rehabilitation Potential For Stated Goals: Good REHAB RECOMMENDATIONS (at time of discharge pending progress):   
Placement: It is my opinion, based on this patient's performance to date, that Ms. Flor Keller may benefit from participating in 1-2 additional therapy sessions in order to continue to assess for rehab potential and then make recommendation for disposition at discharge. Equipment:  
None at this time OCCUPATIONAL PROFILE AND HISTORY:  
History of Present Injury/Illness (Reason for Referral): 
See H&P. Past Medical History/Comorbidities: Ms. Flor Keller  has a past medical history of Acute cystitis without hematuria (1/30/2019), CAD (coronary artery disease), DM2 (diabetes mellitus, type 2) ([facility] Gout, HLD (hyperlipidemia), HTN (hypertension), and Peripheral neuropathy. Ms. Flor Keller  has a past surgical history that includes hx tubal ligation; hx coronary artery bypass graft; pr cardiac surg procedure unlist; and hx cholecystectomy. Social History/Living Environment:  
Home Environment: Apartment # Steps to Enter: 0 One/Two Story Residence: One story Living Alone: Yes Support Systems: Child(marga), Home care staff Patient Expects to be Discharged to[de-identified] Unknown Current DME Used/Available at Home: Shower chair, Walker, rollator, [address] [facility], [place], straight Tub or Shower Type: Tub/Shower combination Prior Level of Function/Work/Activity: At baseline pt lives alone and reports independence to supervision with ADLs, ambulation using rollator. Pt with low vision. Aide comes for 4-5 hrs 5 days a week to supervise ADLs and provide transportation. Pt endorses 1 recent fall. Personal Factors:   
      Sex:  female Age:  78 y.o. Other factors that influence how disability is experienced by the patient:  Multiple co-morbidities, low vision, fall Number of Personal Factors/Comorbidities that affect the Plan of Care: Expanded review of therapy/medical records (1-2):  MODERATE COMPLEXITY ASSESSMENT OF OCCUPATIONAL PERFORMANCE[de-identified]  
 Activities of Daily Living:  
Basic ADLs (From Assessment) Complex ADLs (From Assessment) Feeding: Independent Oral Facial Hygiene/Grooming: Contact guard assistance Bathing: Contact guard assistance Upper Body Dressing: Contact guard assistance Lower Body Dressing: Contact guard assistance Toileting: Contact guard assistance Grooming/Bathing/Dressing Activities of Daily Living Grooming Washing Hands: Contact guard assistance;Stand-by assistance Cognitive Retraining Safety/Judgement: Awareness of environment; Fall prevention;Decreased awareness of environment; Insight into deficits Toileting Toileting Assistance: Contact guard assistance Bladder Hygiene: Contact guard assistance Bowel Hygiene: Contact guard assistance Clothing Management: Contact guard assistance Functional Transfers Bathroom Mobility: Contact guard assistance Toilet Transfer : Contact guard assistance Lower Body Dressing Assistance Socks: Stand-by assistance Bed/Mat Mobility Supine to Sit: Stand-by assistance Sit to Stand: Contact guard assistance Stand to Sit: Contact guard assistance Bed to Chair: Contact guard assistance Scooting: Stand-by assistance Most Recent Physical Functioning:  
Gross Assessment: 
AROM: Generally decreased, functional 
Strength: Generally decreased, functional 
         
  
Posture: 
Posture (WDL): Exceptions to [facility] Posture Assessment: Rounded shoulders Balance: 
Sitting: Intact; Without support Standing: Impaired Standing - Static: Good;Constant support Standing - Dynamic : Fair;Constant support Bed Mobility: 
Supine to Sit: Stand-by assistance Scooting: Stand-by assistance Wheelchair Mobility: 
  
Transfers: 
Sit to Stand: Contact guard assistance Stand to Sit: Contact guard assistance Bed to Chair: Contact guard assistance Patient Vitals for the past 6 hrs: 
 BP SpO2 Pulse 08/13/19 1148 154/79 99 % (!) 57 Mental Status Neurologic State: Alert Orientation Level: Appropriate for age, Oriented to person, Oriented to place, Oriented to situation Cognition: Appropriate decision making, Appropriate for age attention/concentration, Follows commands Perception: Appears intact Perseveration: No perseveration noted Safety/Judgement: Awareness of environment, Fall prevention, Decreased awareness of environment, Insight into deficits Physical Skills Involved: 
Balance Strength Activity Tolerance Vision Cognitive Skills Affected (resulting in the inability to perform in a timely and safe manner): 
None  Psychosocial Skills Affected: 
Habits/Routines Environmental Adaptation Social Interaction Self-Awareness Awareness of Others Social Roles Number of elements that affect the Plan of Care: 5+:  HIGH COMPLEXITY CLINICAL DECISION MAKING:  
[facility] AM-PAC 6 Clicks Daily Activity Inpatient Short Form How much help from another person does the patient currently need. .. Total A Lot A Little None 1. Putting on and taking off regular lower body clothing? ? 1   ? 2   ? 3   ? 4  
2. Bathing (including washing, rinsing, drying)? ? 1   ? 2   ? 3   ? 4  
3. Toileting, which includes using toilet, bedpan or urinal?   ? 1   ? 2   ? 3   ? 4  
4. Putting on and taking off regular upper body clothing? ? 1   ? 2   ? 3   ? 4  
5. Taking care of personal grooming such as brushing teeth? ? 1   ? 2   ? 3   ? 4  
6. Eating meals? ? 1   ? 2   ? 3   ? 4  
© 2007, Trustees of [employer], under license to [employer]. All rights reserved Score:  Initial: 19 8/13/19 Most Recent: X (Date: -- ) Interpretation of Tool:  Represents activities that are increasingly more difficult (i.e. Bed mobility, Transfers, Gait). Medical Necessity:    
Patient demonstrates good 
 rehab potential due to higher previous functional level. Reason for Services/Other Comments: Patient continues to require skilled intervention due to inability to complete ADLs at prior level of independence Aurelio Pacheco Use of outcome tool(s) and clinical judgement create a POC that gives a: MODERATE COMPLEXITY  
 
 
 
TREATMENT:  
(In addition to Assessment/Re-Assessment sessions the following treatments were rendered) Pre-treatment Symptoms/Complaints:   
Pain: Initial:  
Pain Intensity 1: 0  Post Session:  same Self Care: (23 minutes): Procedure(s) (per grid) utilized to improve and/or restore self-care/home management as related to toileting and grooming. Required minimal verbal and manual cueing to facilitate activities of daily living skills and compensatory activities. Pt toileted with CGA for hygiene in standing and groomed in standing with CGA. Therapeutic Activity: (    15 minutes): Therapeutic activities including Chair transfers, Toilet transfers and Ambulation on level ground to improve mobility, strength, balance, coordination and dynamic movement of arm - bilateral and leg - bilateral to improve functional motion and strength . Required minimal   to promote dynamic balance in standing. Treatment initiated to include functional transfers with CGA, ambulation with CGA/RW/cues for RW management, toilet transfer with CGA. Pt sat in chair with uncontrolled descent, educated on stand to sit technique and practiced x10 trials with CGA progressing to SBA. Braces/Orthotics/Lines/Etc:  
O2 Device: Room air Treatment/Session Assessment:   
Response to Treatment:  Tolerated well Interdisciplinary Collaboration: Occupational Therapist 
Registered Nurse After treatment position/precautions:  
Up in chair Bed/Chair-wheels locked Bed in low position Call light within reach RN notified BLEs elevated, needs within reach Compliance with Program/Exercises: Compliant all of the time, Will assess as treatment progresses. Recommendations/Intent for next treatment session: \"Next visit will focus on advancements to more challenging activities and reduction in assistance provided\". Total Treatment Duration: OT Patient Time In/Time Out Time In: 5060 Time Out: 2770 Caterina Greenberg OTR/L

## 2019-08-13 NOTE — PROGRESS NOTES
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  
· Continue Vancomycin and Merrem, at DC plan to transition Merrem to Ertapenem 1g IV Q24: plan ~3 weeks, then give Fosfomycin 3g PO Q3days until ERCP and stent removal 
· Follow repeat BC and TTE-if positive, may need BOOKER 
· No PICC until repeat BC are negative at 48hrs Dispo: SNF is planned. Please note ID does not follow patient at SNF, recommend Labs: Qmonday CBC with diff, Creatinine, LFTs, Vanc trough and Q Thursday Creatinine, vanc trough with routine PICC care We will plan to see at EOT: 19 @ 3:00pm 
 
 
Anti-infectives:  
· merrem - · Vanc - Subjective:  
Resting in the bed, doing better each day. No further fever/chills or sweats. No abdominal pain. Infection, ID plan and treatment discussed at length with pt/ID MD/ARAMIS, >35 mins spent seeing pt today, educating/coordinating care. Allergies Allergen Reactions  Sulfa (Sulfonamide Antibiotics) Swelling Review of Systems:  A comprehensive review of systems was negative except for that written in the History of Present Illness. Objective:  
 
Visit Vitals /71 Pulse 68 Temp 98.1 °F (36.7 °C) Resp 18 Ht 5' 3\" (1.6 m) Wt 91.6 kg (202 lb) SpO2 95% BMI 35.78 kg/m² Temp (24hrs), Av °F (36.7 °C), Min:97.4 °F (36.3 °C), Max:98.6 °F (37 °C) Lines:  Peripheral IV:   Left arm x2 intact Physical Exam:   
General:  Alert, cooperative, Obese Eyes:  Sclera anicteric. Pupils equally round and reactive to light. Mouth/Throat: Mucous membranes normal, oral pharynx clear Neck: Supple Lungs:   Clear to auscultation bilaterally, good effort CV:  Regular rate and rhythm,no murmur, click, rub or gallop Abdomen:   Soft, non-tender. bowel sounds normal. non-distended Extremities: No cyanosis or edema Skin: Skin color, texture, turgor normal. no acute rash or lesions Musculoskeletal: No swelling or deformity Lines/Devices:  Intact, no erythema, drainage or tenderness Psych: Alert and oriented, normal mood affect given the setting Data Review: CBC: 
Recent Labs 08/11/19 
0337 08/10/19 2015 WBC 12.2*  --   
HGB 9.0* 9.4* HCT 27.6* 28.9*  
*  -- BMP: 
Recent Labs 08/13/19 
9399 08/11/19 
9593 CREA 1.20* 1.38* BUN 22 42*  150*  
K 4.1 4.5  
* 119* CO2 26 24 AGAP 4* 7 * 103* LFTS: 
Recent Labs 08/11/19 
1198 TBILI 0.5 * SGOT 136* * TP 4.9* ALB 2.1* Microbiology:  
 
All Micro Results Procedure Component Value Units Date/Time CULTURE, BLOOD [[id number]] Collected:  08/12/19 1116 Order Status:  Completed Specimen:  Blood Updated:  08/13/19 6888 Special Requests: --     
  RIGHT Antecubital 
  
  Culture result: NO GROWTH AFTER 19 HOURS     
 CULTURE, BLOOD [[id number]] Collected:  08/12/19 1109 Order Status:  Completed Specimen:  Blood Updated:  08/13/19 0929 Special Requests: --     
  LEFT Antecubital 
  
  Culture result: NO GROWTH AFTER 19 HOURS     
 CULTURE, BLOOD [[id number]]  (Abnormal) Collected:  08/08/19 1232 Order Status:  Completed Specimen:  Blood Updated:  08/13/19 0701 Special Requests: --     
  LEFT 
FOREARM 
  
  GRAM STAIN GRAM POSITIVE COCCI     
   GRAM NEGATIVE RODS   AEROBIC AND ANAEROBIC BOTTLES  
     
      
 RESULTS VERIFIED, PHONED TO AND READ BACK BY FAY LAM @7800 19 MM RESULTS VERIFIED, PHONED TO AND READ BACK BY [address] ([address] RN ON 19 @0750, TA  
     
  Culture result:    
  PRESUMPTIVE ENTEROCOCCUS SPECIES  
     
   GRAM NEGATIVE RODS REFER TO ACC [id number] FOR ID AND SUSCEPTIBILITY CULTURE, BLOOD [[id number]]  (Abnormal)  (Susceptibility) Collected:  19 1228 Order Status:  Completed Specimen:  Blood Updated:  19 0701 Special Requests: --     
  RIGHT Antecubital 
  
  GRAM STAIN GRAM POSITIVE COCCI     
   GRAM NEGATIVE RODS ANAEROBIC BOTTLE POSITIVE RESULTS VERIFIED, PHONED TO AND READ BACK BY FAY LAM @9882 19 MM RESULTS VERIFIED, PHONED TO AND READ BACK BY NETTIE CANTU RN ON 19 @0750, TA  
     
  Culture result:    
  ENTEROCOCCUS FAECALIS GROUP D  
     
      
  ESCHERICHIA COLI ** (EXTENDED SPECTRUM BETA LACTAMASE ) **  
     
      
  ENTEROCOCCUS SPECIES 
DETECTED 
ESCHERICHIA COLI 
DETECTED Test Performed by Multiplex PCR RESULTS VERIFIED, PHONED TO AND READ BACK BY 
[address] ([facility]) RN AT 7861 19 TA PATIENT IS A KNOWN ESBL     
 CULTURE, URINE [[id number]]  (Abnormal)  (Susceptibility) Collected:  19 1252 Order Status:  Completed Specimen:  Urine from [place] Specimen Updated:  19 6073 Special Requests: NOT GRAY TOP TUBE Culture result:    
  >100,000 COLONIES/mL ESCHERICHIA COLI ** (EXTENDED SPECTRUM BETA LACTAMASE ) **  
     
      
  10,000 to 50,000 COLONIES/mL ENTEROCOCCUS FAECIUM GROUP D  
     
   PATIENT IS A KNOWN ESBL FOSFOMYCON SUSCEPTIBILITY TO FOLLOW Imagin/10/19 ERCP IMPRESSION: Biliary intervention with subsequent stent placement in satisfactory 
position. Please refer to the procedural report for further description and 
detail. 19 abdominal duplex Impression: Portal vein is patent. The celiac, proper hepatic, splenic, and 
superior mesenteric arteries are patent without evidence of significant stenosis 
greater than 50%. The portal-splenic confluence is patent. 8/9/19 CXR IMPRESSION: Interstitial pulmonary edema and left lower lobe atelectasis or 
pneumonia improved. Signed By: Kingsley Tong NP August 13, 2019

## 2019-08-13 NOTE — PROGRESS NOTES
Elodia d/c at this time. Patient voiced understanding to call with assistance to bathroom. Respirations regular rate & rhythm on room air. No s/sx of distress. Bed in low & locked position. Call light and personal belongings within reach.

## 2019-08-13 NOTE — PROGRESS NOTES
Date of Outreach Update: 
Rosemary Roth was seen and assessed. Previous Outreach assessment has been reviewed. There have been no significant clinical changes since the completion of the last dated Outreach assessment. Patient resting in bed, no visible signs of distress. Breathing even and unlabored, on room air. Patient discussed with primary RN who voices no concerns. Will continue to follow up per outreach protocol. Signed By:   Micki Jalloh   August 13, 2019 2:57 AM

## 2019-08-13 NOTE — PROGRESS NOTES
[facility] Progress Note Admit Date:  8/8/2019 Today's Date:  8/13/2019 CC:  Elevated liver chemistry Subjective:  
 
Patient remains in 8th floor room. Eating breakfast and reports good appetite. Denies abdominal pain. No fever, chills. Plans to work with PT again today. Medications:  
Current Facility-Administered Medications Medication Dose Route Frequency  metoprolol tartrate (LOPRESSOR) tablet 12.5 mg  12.5 mg Oral Q12H  
 vancomycin (VANCOCIN) 1,250 mg in dextrose 5% 250 mL IVPB  1,250 mg IntraVENous Q24H  pantoprazole (PROTONIX) tablet 40 mg  40 mg Oral ACB&D  
 losartan (COZAAR) tablet 100 mg  100 mg Oral DAILY  meropenem (MERREM) 500 mg in 0.9% sodium chloride (MBP/ADV) 50 mL  500 mg IntraVENous Q8H  
 lip protectant (BLISTEX) ointment 1 Each  1 Each Topical PRN  
 0.9% sodium chloride infusion 250 mL  250 mL IntraVENous PRN  
 insulin lispro (HUMALOG) injection   SubCUTAneous AC&HS  
 0.9% sodium chloride infusion 250 mL  250 mL IntraVENous PRN  
 insulin glargine (LANTUS) injection 50 Units  50 Units SubCUTAneous QHS  sodium chloride (NS) flush 5-10 mL  5-10 mL IntraVENous PRN  
 0.9% sodium chloride infusion 250 mL  250 mL IntraVENous PRN  
 sodium chloride (NS) flush 5-40 mL  5-40 mL IntraVENous Q8H  
 sodium chloride (NS) flush 5-40 mL  5-40 mL IntraVENous PRN  
 ondansetron (ZOFRAN) injection 4 mg  4 mg IntraVENous Q4H PRN  
 dextrose 40% (GLUTOSE) oral gel 1 Tube  15 g Oral PRN  
 glucagon (GLUCAGEN) injection 1 mg  1 mg IntraMUSCular PRN  
 dextrose (D50W) injection syrg 12.5-25 g  25-50 mL IntraVENous PRN Review of Systems: ROS was obtained, with pertinent positives as listed above. No chest pain or SOB. Diet:  Diabetic regular Objective:  
Vitals: 
Visit Vitals /71 Pulse (!) 59 Temp 98.1 °F (36.7 °C) Resp 18 Ht 5' 3\" (1.6 m) Wt 91.6 kg (202 lb) SpO2 95% BMI 35.78 kg/m² Intake/Output: 
No intake/output data recorded. 08/11 1901 - 08/13 0700 In: 1948.3 [P.O.:960; I.V.:988.3] Out: 2320 [Urine:2320] Exam: 
General appearance: alert, cooperative, no distress Lungs: clear to auscultation bilaterally anteriorly Heart: regular rate and rhythm Abdomen: soft, non-tender. Bowel sounds normal. No masses, no organomegaly Extremities: extremities normal, atraumatic, no cyanosis or edema Neuro:  alert and oriented Data Review (Labs):   
Recent Labs 08/13/19 
2803 08/11/19 
2323 08/10/19 2015 WBC  --  12.2*  --   
HGB  --  9.0* 9.4* HCT  --  27.6* 28.9*  
PLT  --  100*  --   
MCV  --  91.7  --   
 150*  --   
K 4.1 4.5  --   
* 119*  --   
CO2 26 24  --   
BUN 22 42*  --   
CREA 1.20* 1.38*  --   
CA 8.1* 7.9*  --   
* 103*  --   
AP  --  236*  --   
SGOT  --  136*  --   
ALT  --  680*  --   
TBILI  --  0.5  --   
ALB  --  2.1*  --   
TP  --  4.9*  --   
 
 
Endoscopic Retrograde Cholangiopancreatogram  
DATE of PROCEDURE: 8/10/2019  
POSTPROCEDURE DIAGNOSIS: 
1. Possible cholangitis MEDICATIONS ADMINISTERED:  GETA  
INSTRUMENT: [id number]  
PROCEDURE:  After obtaining informed consent, the patient was placed in prone position on the fluoroscopy table. The patient was then sedated. The endoscope was passed under indirect technique to the oropharynx and gently passed into the esophagus. The endoscope was passed to the ampulla. Limited views of the stomach and duodenum were obtained. After the procedure, the patient was taken to the recovery area in stable condition.  
Ampulla: The ampulla was located in the expected position and was edematous and bulging into the second portion of the duodenum.  There was evidence of prior sphincterotomy with no active bleeding.   
Cannulation: Cannulation performed on first attempt with the short nose traction sphincterotome preloaded with a 0.035 inch guidewire which was used for deep cannulation of the biliary tree. Contrast was injected resulting in complete opacification.   
Cholangiogram: 
Initial cholangiogram demonstrated a dilated CBD with apparent stenosis in the distal CBD, likely from the edematous ampulla. After cannulation, when the tome was lifted, thick black bile with small wisps of white pus were seen.   
Interventions:  
The CBD was swept multiple times with the 12 mm biliary extraction balloon, producing a thick black bile/sludge but no stones. There was no evidence of contrast extravasation. The intrahepatic biliary tree appeared normal. Final sweeps were clear and produced clear green bile.   
The bile duct was observed under fluoroscopy, and contrast did not appear to drain. There appeared to be a smooth cutoff in the distal CBD as seen on image 5, likely from the edematous ampulla.   
Next, one 10 Fr x 9 cm plastic biliary stent (7 cm not available) was placed in the common bile duct. Green bile was seen flowing through the stent. The stent was placed in good position.    
Estimated Blood Loss: 0 cc-min  
ASSESSMENT: 
1. Possible Cholangitis 2. Ampullary stenosis and edema 3. Placement of 10 Fr biliary stent   
PLAN: 
1. Follow up with inpatient team  
2. Follow LFTs 3. Continue antibiotic therapy, follow microbiology culture data 4. If she declines clinically, consider urinary source as she has history of ESBL E.coli in the urine 5. Repeat ERCP in 4-6 weeks for stent management  
  
 
 
 
 
Assessment:  
 
Active Problems: 
  Uncontrolled type 2 diabetes mellitus with hyperglycemia ([facility] [address] (6/15/2015) Chronic kidney disease, stage III (moderate) (HCC) (6/15/2015) Acute renal failure (ARF) ([address] (8/8/2019) Elevated liver function tests (8/8/2019) Anemia (8/8/2019) Right lower quadrant abdominal pain (8/8/2019) Gram-negative bacteremia (8/9/2019) Acute pancreatitis (8/12/2019) 77 yo female presented in septic shock 2 weeks after ERCP, with labs suggesting ischemic hepatitis, pancreatitis with PRATIMA. Non contrast CT unrevealing, ultrasound with dopplers ruled out mesenteric vascular clot. She has bacteremia and ESBL-producing E. Coli in urine culture from 8/8 but UA generally bland on admission and no dysuria.  ERCP 8/10 to evaluate and treat for possible cholangitis with stent placed. Abx broadened to cover for ESBL. Afebrile. Blood cultures with no growth at this point. Urine culture on admission with ecoli. ID consult reviewed. Leukocytosis improved. Plan: 1. Remains on Vancomycin, Merrem 2. WBC improving (now 12.2) 3. Liver chemistry improving 4. Repeat ERCP in 4-6 weeks for stent removal unless needs to be removed earlier Patient is seen and examined in collaboration with Dr. Berta Ewing. Assessment and plan as per Dr. Berta Ewing.  
Emily Beck NP

## 2019-08-13 NOTE — PROGRESS NOTES
Hospitalist Progress Note 2019 Admit Date: 2019 11:57 AM  
NAME: Jerod Melgoza :  1940 MRN:  [id number] Attending: Brown Goins MD 
PCP:  Marcie Larkin MD 
 
SUBJECTIVE:  
 
Jerod Melgoza is a 78years old female with hx of CABG, HTN, HLD, CKD-3, s/p cholecystectomy, GERD admitted on 19 for sepsis, acute pancreatitis, ischemic/shock liver, lactic acidosis, PRATIMA, transaminits. Pt had a recent ERCP on  for dilated CBD with only small stone. Pt also has ESBL E.coli and Enterococcus in urine culture and GNR on blood culture. Pt likely developed post ERCP pancreatitis and possible cholangitis. Pt had repeat ERCP on 8/10 with placement of CBD stent. : 
Pt reports feeling better. Denies chest pain, nausea/vomiting, fever, chills, diarrhea, abdominal pain. Discussed about possible requirement of long term IV antibiotics. No overnight events. Review of Systems negative with exception of pertinent positives noted above PHYSICAL EXAM  
 
 
Visit Vitals /71 Pulse (!) 59 Temp 98.1 °F (36.7 °C) Resp 18 Ht 5' 3\" (1.6 m) Wt 91.6 kg (202 lb) SpO2 95% BMI 35.78 kg/m² Temp (24hrs), Av °F (36.7 °C), Min:97.4 °F (36.3 °C), Max:98.6 °F (37 °C) Oxygen Therapy O2 Sat (%): 95 % (19 0733) Pulse via Oximetry: 79 beats per minute (19 1245) O2 Device: Room air (19 0806) O2 Flow Rate (L/min): 2 l/min (19 0640) FIO2 (%): 28 % (08/10/19 0750) Intake/Output Summary (Last 24 hours) at 2019 4392 Last data filed at 2019 0630 Gross per 24 hour Intake 1898.33 ml Output 1300 ml Net 598.33 ml General: NAD, elderly, on 2ltrs NC Head:  Atraumatic Normocephalic. Eyes:  PERRLA, EOMI, Anicteric. ENT:  No discharges/lesions. Lungs:  CTA Bilaterally. CVS:  Regular rate and rhythm,  No murmur, rub, or gallop, No JVD, No lower   extremity edema. Abdomen: Soft, Non distended, Non tender, Positive bowel sounds. MSK:  No deformities, lesions, Spontaneously moves extremities. Neurologic:  GCS 15, no motor or sensory deficits Psychiatry:      No anxiety/Depression Skin:   No rash/lesions. Good skin turgor Heme/Lymph/Immune:  No petechiae, ecchymoses, overt signs of bleeding or    lymphadenopathy noted. Recent Results (from the past 24 hour(s)) CULTURE, BLOOD Collection Time: 08/12/19 11:09 AM  
Result Value Ref Range Special Requests: LEFT Antecubital 
    
 Culture result: NO GROWTH AFTER 19 HOURS    
CULTURE, BLOOD Collection Time: 08/12/19 11:16 AM  
Result Value Ref Range Special Requests: RIGHT Antecubital 
    
 Culture result: NO GROWTH AFTER 19 HOURS    
GLUCOSE, POC Collection Time: 08/12/19 11:52 AM  
Result Value Ref Range Glucose (POC) 170 (H) 65 - 100 mg/dL GLUCOSE, POC Collection Time: 08/12/19  4:43 PM  
Result Value Ref Range Glucose (POC) 241 (H) 65 - 100 mg/dL GLUCOSE, POC Collection Time: 08/12/19  8:32 PM  
Result Value Ref Range Glucose (POC) 264 (H) 65 - 100 mg/dL GLUCOSE, POC Collection Time: 08/13/19  5:43 AM  
Result Value Ref Range Glucose (POC) 163 (H) 65 - 100 mg/dL METABOLIC PANEL, BASIC Collection Time: 08/13/19  6:44 AM  
Result Value Ref Range Sodium 141 136 - 145 mmol/L Potassium 4.1 3.5 - 5.1 mmol/L Chloride 111 (H) 98 - 107 mmol/L  
 CO2 26 21 - 32 mmol/L Anion gap 4 (L) 7 - 16 mmol/L Glucose 186 (H) 65 - 100 mg/dL BUN 22 8 - 23 MG/DL Creatinine 1.20 (H) 0.6 - 1.0 MG/DL  
 GFR est AA 55 (L) >60 ml/min/1.73m2 GFR est non-AA 46 (L) >60 ml/min/1.73m2 Calcium 8.1 (L) 8.3 - 10.4 MG/DL Imaging Daphene Glance [place] Cho All diagnostic imaging personally reviewed by me. ASSESSMENT Hospital Problems as of 8/13/2019 Date Reviewed: 8/10/2019 Codes Class Noted - Resolved POA Acute pancreatitis ICD-10-CM: K85.90 ICD-9-CM: 075.8  8/12/2019 - Present Unknown Gram-negative bacteremia ICD-10-CM: R78.81 ICD-9-CM: 790.7, 041.85  8/9/2019 - Present Yes Acute renal failure (ARF) (HCC) ICD-10-CM: N17.9 ICD-9-CM: 584.9  8/8/2019 - Present Yes Elevated liver function tests ICD-10-CM: R94.5 ICD-9-CM: 790.6  8/8/2019 - Present Yes Anemia ICD-10-CM: D64.9 ICD-9-CM: 285.9  8/8/2019 - Present Yes Right lower quadrant abdominal pain ICD-10-CM: R10.31 ICD-9-CM: 789.03  8/8/2019 - Present Yes Uncontrolled type 2 diabetes mellitus with hyperglycemia (HCC) (Chronic) ICD-10-CM: E11.65 ICD-9-CM: 250.02  6/15/2015 - Present Yes Chronic kidney disease, stage III (moderate) (HCC) (Chronic) ICD-10-CM: N18.3 ICD-9-CM: 585.3  6/15/2015 - Present Yes * (Principal) RESOLVED: Septic shock ([facility] [address] ICD-10-CM: A41.9, R65.21 ICD-9-CM: 038.9, 785.52, 995.92  8/8/2019 - 8/12/2019 Yes Plan: 
- repeat Blood cultures from 8/12 negative to date, TTE pending ESBL E.coli and Enterococcus in Urine and blood culture from 8/8/19 Continue IV Merrem and Vancomycin ID recommends transition to Ertapenem for 3 weeks followed by Fosfomycin q3days until ERCP and stent removal 
If TTE is positive, will need BOOKER 
- hypernatremia: resolved, 141 today 
- sepsis: resolving, hemodynamically stable - PRATIMA: resolving, Cr 1.20 now. Continuously improving Trend daily BMP 
- Transamnitis with shock liver: liver enzymes are normalizing 
- pancreatitis: resolving, no abdominal pain, nausea/vomiting. Pt is tolerating diet. - GI recommends ERCP in 4-6 weeks for stent removal 
- PT/OT eval 
- switch PPI from IV to oral 
 
DVT Prophylaxis: SCD's Dispo: pending until antibiotic duration is finalized Margie Elizondo MD

## 2019-08-13 NOTE — PROGRESS NOTES
[facility] OUTREACH NURSE PROGRESS REPORT SUBJECTIVE: Called to assess patient secondary to transfer from ICU. MEWS Score: 1 (08/12/19 1943) Vitals:  
 08/12/19 1157 08/12/19 1325 08/12/19 1540 08/12/19 1943 BP: 147/78  132/77 156/87 Pulse: (!) 56  (!) 56 79 Resp: 18 19 17 Temp: 98.6 °F (37 °C)  97.8 °F (36.6 °C) 97.4 °F (36.3 °C) SpO2: 93% 93% 95% 93% Weight:      
Height:      
  
 
 
LAB DATA: 
 
Recent Labs 08/11/19 
2954 08/10/19 
6259 * 146*  
K 4.5 4.5  
* 116* CO2 24 23 AGAP 7 7 * 435* BUN 42* 86* CREA 1.38* 1.95* GFRAA 47* 32* GFRNA 39* 26*  
CA 7.9* 8.0* ALB 2.1* 2.1*  
TP 4.9* 4.6*  
GLOB 2.8 2.5 AGRAT 0.8* 0.8* * 986* Recent Labs 08/11/19 
3303 08/10/19 2015 08/10/19 
0341 WBC 12.2*  --  15.2* HGB 9.0* 9.4* 6.4* HCT 27.6* 28.9* 19.1*  
*  --  123* OBJECTIVE: On arrival to room, I found patient to be sitting up in recliner. Pain Assessment Pain Intensity 1: 0 (08/12/19 1920) Pain Location 1: Abdomen, Leg 
Pain Intervention(s) 1: Position, Nurse notified Patient Stated Pain Goal: 0 
 
 
 
ASSESSMENT:  Patient alert, no visible signs of distress. Breathing even and unlabored on room air. Primary RN at bedside. VSS, progress notes reviewed. PLAN:  Will continue to follow per outreach protocol.

## 2019-08-13 NOTE — PROGRESS NOTES
Critical Care Outreach Nurse Progress Report: 
 
Subjective: In to assess pt secondary to f/u ICU transfer. MEWS Score: 1 (08/13/19 1148) Vitals:  
 08/13/19 0350 08/13/19 9141 08/13/19 0911 08/13/19 1148 BP: 144/67 137/71  154/79 Pulse: 89 (!) 59 68 (!) 57 Resp: 15 18  16 Temp: 97.9 °F (36.6 °C) 98.1 °F (36.7 °C)  98.1 °F (36.7 °C) SpO2: 98% 95%  99% Weight: 91.6 kg (202 lb) Height:      
  
 
Objective: Pt sitting up in bed, eating lunch, watching tv, in NAD. Pain Intensity 1: 0 (08/13/19 1154) Pain Location 1: Abdomen, Leg 
Pain Intervention(s) 1: Position, Nurse notified Patient Stated Pain Goal: 0 Assessment: A&Ox3. Lung sounds clear. O2 Sat 96% on RA. HR 57. Denies complaints or needs at this time. Plan:  Will discharge from outreach program.

## 2019-08-14 LAB
ALBUMIN SERPL-MCNC: 2.2 G/DL (ref 3.2–4.6)
ALBUMIN/GLOB SERPL: 0.7 {RATIO} (ref 1.2–3.5)
ALP SERPL-CCNC: 366 U/L (ref 50–136)
ALT SERPL-CCNC: 245 U/L (ref 12–65)
ANION GAP SERPL CALC-SCNC: 4 MMOL/L (ref 7–16)
AST SERPL-CCNC: 29 U/L (ref 15–37)
BILIRUB DIRECT SERPL-MCNC: 0.2 MG/DL
BILIRUB SERPL-MCNC: 0.4 MG/DL (ref 0.2–1.1)
BUN SERPL-MCNC: 20 MG/DL (ref 8–23)
CALCIUM SERPL-MCNC: 8.1 MG/DL (ref 8.3–10.4)
CHLORIDE SERPL-SCNC: 110 MMOL/L (ref 98–107)
CO2 SERPL-SCNC: 28 MMOL/L (ref 21–32)
CREAT SERPL-MCNC: 1.2 MG/DL (ref 0.6–1)
ERYTHROCYTE [DISTWIDTH] IN BLOOD BY AUTOMATED COUNT: 15.6 % (ref 11.9–14.6)
GLOBULIN SER CALC-MCNC: 3.2 G/DL (ref 2.3–3.5)
GLUCOSE BLD STRIP.AUTO-MCNC: 106 MG/DL (ref 65–100)
GLUCOSE BLD STRIP.AUTO-MCNC: 174 MG/DL (ref 65–100)
GLUCOSE BLD STRIP.AUTO-MCNC: 182 MG/DL (ref 65–100)
GLUCOSE BLD STRIP.AUTO-MCNC: 217 MG/DL (ref 65–100)
GLUCOSE SERPL-MCNC: 119 MG/DL (ref 65–100)
HCT VFR BLD AUTO: 29.6 % (ref 35.8–46.3)
HGB BLD-MCNC: 9.5 G/DL (ref 11.7–15.4)
MCH RBC QN AUTO: 30 PG (ref 26.1–32.9)
MCHC RBC AUTO-ENTMCNC: 32.1 G/DL (ref 31.4–35)
MCV RBC AUTO: 93.4 FL (ref 79.6–97.8)
NRBC # BLD: 0 K/UL (ref 0–0.2)
PLATELET # BLD AUTO: 156 K/UL (ref 150–450)
PMV BLD AUTO: 11.6 FL (ref 9.4–12.3)
POTASSIUM SERPL-SCNC: 4.3 MMOL/L (ref 3.5–5.1)
PROT SERPL-MCNC: 5.4 G/DL (ref 6.3–8.2)
RBC # BLD AUTO: 3.17 M/UL (ref 4.05–5.2)
SODIUM SERPL-SCNC: 142 MMOL/L (ref 136–145)
WBC # BLD AUTO: 10.6 K/UL (ref 4.3–11.1)

## 2019-08-14 PROCEDURE — 74011250636 HC RX REV CODE- 250/636: Performed by: INTERNAL MEDICINE

## 2019-08-14 PROCEDURE — 36573 INSJ PICC RS&I 5 YR+: CPT | Performed by: NURSE PRACTITIONER

## 2019-08-14 PROCEDURE — 74011250637 HC RX REV CODE- 250/637: Performed by: HOSPITALIST

## 2019-08-14 PROCEDURE — 97110 THERAPEUTIC EXERCISES: CPT

## 2019-08-14 PROCEDURE — 74011250636 HC RX REV CODE- 250/636: Performed by: HOSPITALIST

## 2019-08-14 PROCEDURE — 74011636637 HC RX REV CODE- 636/637: Performed by: INTERNAL MEDICINE

## 2019-08-14 PROCEDURE — 80076 HEPATIC FUNCTION PANEL: CPT

## 2019-08-14 PROCEDURE — 36415 COLL VENOUS BLD VENIPUNCTURE: CPT

## 2019-08-14 PROCEDURE — C1751 CATH, INF, PER/CENT/MIDLINE: HCPCS

## 2019-08-14 PROCEDURE — 74011000258 HC RX REV CODE- 258: Performed by: HOSPITALIST

## 2019-08-14 PROCEDURE — 97530 THERAPEUTIC ACTIVITIES: CPT

## 2019-08-14 PROCEDURE — 82962 GLUCOSE BLOOD TEST: CPT

## 2019-08-14 PROCEDURE — 02HV33Z INSERTION OF INFUSION DEVICE INTO SUPERIOR VENA CAVA, PERCUTANEOUS APPROACH: ICD-10-PCS | Performed by: NURSE PRACTITIONER

## 2019-08-14 PROCEDURE — 65270000029 HC RM PRIVATE

## 2019-08-14 PROCEDURE — 80048 BASIC METABOLIC PNL TOTAL CA: CPT

## 2019-08-14 PROCEDURE — 74011250637 HC RX REV CODE- 250/637: Performed by: INTERNAL MEDICINE

## 2019-08-14 PROCEDURE — 74011000258 HC RX REV CODE- 258: Performed by: INTERNAL MEDICINE

## 2019-08-14 PROCEDURE — 85027 COMPLETE CBC AUTOMATED: CPT

## 2019-08-14 RX ORDER — SODIUM CHLORIDE 0.9 % (FLUSH) 0.9 %
10 SYRINGE (ML) INJECTION AS NEEDED
Status: DISCONTINUED | OUTPATIENT
Start: 2019-08-14 | End: 2019-08-16 | Stop reason: HOSPADM

## 2019-08-14 RX ORDER — HEPARIN 100 UNIT/ML
300 SYRINGE INTRAVENOUS EVERY 8 HOURS
Status: DISCONTINUED | OUTPATIENT
Start: 2019-08-14 | End: 2019-08-16 | Stop reason: HOSPADM

## 2019-08-14 RX ORDER — SODIUM CHLORIDE 0.9 % (FLUSH) 0.9 %
10 SYRINGE (ML) INJECTION EVERY 8 HOURS
Status: DISCONTINUED | OUTPATIENT
Start: 2019-08-14 | End: 2019-08-16 | Stop reason: HOSPADM

## 2019-08-14 RX ORDER — HEPARIN 100 UNIT/ML
300 SYRINGE INTRAVENOUS AS NEEDED
Status: DISCONTINUED | OUTPATIENT
Start: 2019-08-14 | End: 2019-08-16 | Stop reason: HOSPADM

## 2019-08-14 RX ADMIN — Medication 10 ML: at 06:20

## 2019-08-14 RX ADMIN — LOSARTAN POTASSIUM 100 MG: 50 TABLET ORAL at 09:49

## 2019-08-14 RX ADMIN — METOPROLOL TARTRATE 12.5 MG: 25 TABLET ORAL at 21:09

## 2019-08-14 RX ADMIN — Medication 5 ML: at 21:09

## 2019-08-14 RX ADMIN — PANTOPRAZOLE SODIUM 40 MG: 40 TABLET, DELAYED RELEASE ORAL at 17:19

## 2019-08-14 RX ADMIN — INSULIN LISPRO 6 UNITS: 100 INJECTION, SOLUTION INTRAVENOUS; SUBCUTANEOUS at 21:08

## 2019-08-14 RX ADMIN — SODIUM CHLORIDE, PRESERVATIVE FREE 300 UNITS: 5 INJECTION INTRAVENOUS at 21:09

## 2019-08-14 RX ADMIN — VANCOMYCIN HYDROCHLORIDE 1250 MG: 10 INJECTION, POWDER, LYOPHILIZED, FOR SOLUTION INTRAVENOUS at 09:49

## 2019-08-14 RX ADMIN — MEROPENEM 500 MG: 500 INJECTION, POWDER, FOR SOLUTION INTRAVENOUS at 17:20

## 2019-08-14 RX ADMIN — INSULIN LISPRO 3 UNITS: 100 INJECTION, SOLUTION INTRAVENOUS; SUBCUTANEOUS at 17:19

## 2019-08-14 RX ADMIN — Medication 10 ML: at 14:41

## 2019-08-14 RX ADMIN — PANTOPRAZOLE SODIUM 40 MG: 40 TABLET, DELAYED RELEASE ORAL at 06:21

## 2019-08-14 RX ADMIN — INSULIN LISPRO 3 UNITS: 100 INJECTION, SOLUTION INTRAVENOUS; SUBCUTANEOUS at 06:21

## 2019-08-14 RX ADMIN — MEROPENEM 500 MG: 500 INJECTION, POWDER, FOR SOLUTION INTRAVENOUS at 02:28

## 2019-08-14 RX ADMIN — INSULIN GLARGINE 50 UNITS: 100 INJECTION, SOLUTION SUBCUTANEOUS at 21:08

## 2019-08-14 RX ADMIN — METOPROLOL TARTRATE 12.5 MG: 25 TABLET ORAL at 09:49

## 2019-08-14 RX ADMIN — Medication 10 ML: at 21:08

## 2019-08-14 RX ADMIN — SODIUM CHLORIDE, PRESERVATIVE FREE 300 UNITS: 5 INJECTION INTRAVENOUS at 14:41

## 2019-08-14 RX ADMIN — MEROPENEM 500 MG: 500 INJECTION, POWDER, FOR SOLUTION INTRAVENOUS at 09:49

## 2019-08-14 NOTE — PROGRESS NOTES
SB[facility] shift report received from [facility]. Pt stable. Assessment complete. Pt is lying in bed, watching tv. Resp even, unlabored. Pt is alert, orient X 4. Pt appears in no acute distress at this time. Pt is on RA. Pt voices no complaints or concerns at this time. Pt encouraged to call for assistance, call light in reach. Safety measures in place. Will continue to monitor

## 2019-08-14 NOTE — PROGRESS NOTES
PICC Placement Note PRE-PROCEDURE VERIFICATION Correct Procedure: yes. Time out completed with assistant Judie Chacko RN VAT and all persons present in agreement with time out. Correct Site:  yes Temperature: Temp: 97.7 °F (36.5 °C), Temperature Source: Temp Source: Oral 
Recent Labs 08/14/19 
1247 BUN 20  
CREA 1.20*  WBC 10.6 Allergies: Sulfa (sulfonamide antibiotics) Education materials for [facility] given to patient or family. PROCEDURE DETAIL A single lumen PICC line was started for long term antibiotics. The following documentation is in addition to the PICC properties in the lines/airways flowsheet : 
Lot #: [id number] 
xylocaine used: yes Mid-Arm Circumference: 31 (cm) Internal Catheter Length: 37 (cm) Internal Catheter Total Length: 37 (cm) Vein Selection for PICC:right basilic Central Line Bundle followed yes Complication Related to Insertion: none Both the insertion guidewire and ECG guidewire were removed intact all ports have positive blood return and were flush well with normal saline. The location of the tip of the PICC is verified using ECG technology. The tip is in the SVC per ECG reading. See image below. Line is okay to use: yes

## 2019-08-14 NOTE — PROGRESS NOTES
Bedside report received from  [facility], [address] [place] [address]. Assessment completed. Bed is in low and locked position, with floor free of clutter. Respirations even and unlabored. Breath sounds diminished. Alert and Oriented x3. Room air. Call light within reach.

## 2019-08-14 NOTE — PROGRESS NOTES
Spoke with patient's daughter, Phil, regarding discharge plans. Patient has been to [facility] in the past and wishes to go back at discharge since she is going to require IV antibiotics. Bed request forwarded to [facility]. Case Management will continue to follow. Care Management Interventions PCP Verified by CM: Kevin Armstrong) Transition of Care Consult (CM Consult): Discharge Planning Discharge Durable Medical Equipment: No 
Physical Therapy Consult: Yes Occupational Therapy Consult: Yes Speech Therapy Consult: No 
Current Support Network: Lives Alone, Own Home Confirm Follow Up Transport: Family Plan discussed with Pt/Family/Caregiver: Yes Freedom of Choice Offered: Yes Discharge Location Discharge Placement: Rehab Unit Subacute

## 2019-08-14 NOTE — PROGRESS NOTES
Problem: Mobility Impaired (Adult and Pediatric) Goal: *Acute Goals and Plan of Care (Insert Text) Description LTG: 
(1.)Ms. Nithya Topete will move from supine to sit and sit to supine , scoot up and down and roll side to side in bed with MODIFIED INDEPENDENCE within 7 treatment day(s). (2.)Ms. Nithya Topete will transfer from bed to chair and chair to bed with SUPERVISION using the least restrictive device within 7 treatment day(s). (3.)Ms. Nithya Topete will ambulate with SUPERVISION for 200 feet with the least restrictive device within 7 treatment day(s). (4.)Ms. Nithya Topete will participate in therapeutic activity/exercises x 23 minutes for increased strength within 7 treatment days. (5.)Ms. Nithya Topete will perform standing static and dynamic balance activities x 12 minutes with STAND BY ASSIST to improve safety within 7 treatment day(s). ________________________________________________________________________________________________ Outcome: Progressing Towards Goal 
  
PHYSICAL THERAPY: Daily Note and PM 8/14/2019 INPATIENT: PT Visit Days : 2 Payor: [employer] / Plan: [address] / Product Type: [employer] Care Medicare /   
  
NAME/AGE/GENDER: Jesus Hurst is a 78 y.o. female PRIMARY DIAGNOSIS: Septic shock ([address] [A41.9, R65.21] Septic shock ([address] [A41.9, R65.21] Septic shock ([address] Septic shock ([address] Procedure(s) (LRB): ENDOSCOPIC RETROGRADE CHOLANGIOPANCREATOGRAPHY (ERCP) (N/A) ENDOSCOPIC STONE EXTRACTION/BALLOON SWEEP (N/A) BILIARY STENT PLACEMENT (N/A) 4 Days Post-Op ICD-10: Treatment Diagnosis:  
 · Generalized Muscle Weakness (M62.81) · Difficulty in walking, Not elsewhere classified (R26.2) · History of falling (Z91.81) Precaution/Allergies: 
Sulfa (sulfonamide antibiotics) ASSESSMENT:  
 
Ms. Nithya Topete  Is sitting up in bedside chair upon contact and agreeable to PT treatment this afternoon. Pt performed STS to RW with Bonita.  Pt demonstrates good static standing balance and RW before initiating gait. Pt ambulated in room with RW and CGA-SBA. Pt furthered gait distance ambulating 60 ft with RW and CGA. Pt ambulates with widened TERESA, shuffling gait pattern and increased trunk sway. Pt with one episode of unsteadiness but able to correct with CGA. Pt returned to room and sitting up in bedside chair requiring cues for safety of transfer and modA for controlled descent to chair. Pt performed exercises in chair requiring VC for technique. Pt left sitting up in chair with all needs met and within reach. Pt is making slow steady progress towards goal. Will continue efforts. This section established at most recent assessment PROBLEM LIST (Impairments causing functional limitations): 1. Decreased Strength 2. Decreased ADL/Functional Activities 3. Decreased Transfer Abilities 4. Decreased Ambulation Ability/Technique 5. Decreased Balance 6. Decreased Activity Tolerance INTERVENTIONS PLANNED: (Benefits and precautions of physical therapy have been discussed with the patient.) 1. Balance Exercise 2. Bed Mobility 3. Family Education 4. Gait Training 5. Therapeutic Activites 6. Therapeutic Exercise/Strengthening 7. Transfer Training TREATMENT PLAN: Frequency/Duration: 3 times a week for duration of hospital stay Rehabilitation Potential For Stated Goals: Good REHAB RECOMMENDATIONS (at time of discharge pending progress):   
Placement: It is my opinion, based on this patient's performance to date, that Ms. Seda Graham may benefit from intensive therapy at a [address] [place] [address] after discharge due to the functional deficits listed above that are likely to improve with skilled rehabilitation and concerns that he/she may be unsafe to be unsupervised at home due to decreased strength and balance putting pt at increased risk for falls. Equipment:  
? None at this time HISTORY:  
 History of Present Injury/Illness (Reason for Referral): 
sepsis Past Medical History/Comorbidities: Ms. Sadie Nguyen  has a past medical history of Acute cystitis without hematuria (1/30/2019), CAD (coronary artery disease), DM2 (diabetes mellitus, type 2) ([facility] Gout, HLD (hyperlipidemia), HTN (hypertension), and Peripheral neuropathy. Ms. Sadie Nguyen  has a past surgical history that includes hx tubal ligation; hx coronary artery bypass graft; pr cardiac surg procedure unlist; and hx cholecystectomy. Social History/Living Environment:  
Home Environment: Apartment # Steps to Enter: 0 One/Two Story Residence: One story Living Alone: Yes Support Systems: Child(marga), Home care staff Patient Expects to be Discharged to[de-identified] Unknown Current DME Used/Available at Home: Shower chair, Walker, rollator, [place], straight Tub or Shower Type: Tub/Shower combination Prior Level of Function/Work/Activity: 
Lives in senior apartment alone and PRN use of AD for [facility] ambulation. One recent fall reported. Number of Personal Factors/Comorbidities that affect the Plan of Care: 1-2: MODERATE COMPLEXITY EXAMINATION:  
Most Recent Physical Functioning:  
Gross Assessment: 
  
         
  
Posture: 
  
Balance: 
Standing - Static: Good;Constant support Standing - Dynamic : Fair;Constant support Bed Mobility: 
  
Wheelchair Mobility: 
  
Transfers: 
Sit to Stand: Minimum assistance Stand to Sit: Moderate assistance Gait: 
  
Base of Support: Widened Speed/Noemi: Slow;Shuffled Step Length: Left shortened;Right shortened Gait Abnormalities: Decreased step clearance;Trunk sway increased; Shuffling gait Distance (ft): 60 Feet (ft) Assistive Device: Walker, rolling Ambulation - Level of Assistance: Contact guard assistance Interventions: Safety awareness training; Tactile cues; Verbal cues Body Structures Involved: 1. Metabolic 2. Endocrine 3. Muscles Body Functions Affected: 1. Neuromusculoskeletal 
2. Movement Related 3. Metobolic/Endocrine Activities and Participation Affected: 1. General Tasks and Demands 2. Mobility 3. Self Care 4. Domestic Life 5. Community, Social and [place] Number of elements that affect the Plan of Care: 4+: HIGH COMPLEXITY CLINICAL PRESENTATION:  
Presentation: Stable and uncomplicated: LOW COMPLEXITY CLINICAL DECISION MAKING:  
[facility] [employer] AM-PAC 6 Clicks Basic Mobility Inpatient Short Form How much difficulty does the patient currently have. .. Unable A Lot A Little None 1. Turning over in bed (including adjusting bedclothes, sheets and blankets)? ? 1   ? 2   ? 3   ? 4  
2. Sitting down on and standing up from a chair with arms ( e.g., wheelchair, bedside commode, etc.)   ? 1   ? 2   ? 3   ? 4  
3. Moving from lying on back to sitting on the side of the bed?   ? 1   ? 2   ? 3   ? 4 How much help from another person does the patient currently need. .. Total A Lot A Little None 4. Moving to and from a bed to a chair (including a wheelchair)? ? 1   ? 2   ? 3   ? 4  
5. Need to walk in hospital room? ? 1   ? 2   ? 3   ? 4  
6. Climbing 3-5 steps with a railing? ? 1   ? 2   ? 3   ? 4  
© 2007, Trustees of [facility] [employer], under license to [employer]. All rights reserved Score:  Initial: 19 Most Recent: X (Date: -- ) Interpretation of Tool:  Represents activities that are increasingly more difficult (i.e. Bed mobility, Transfers, Gait). Medical Necessity:    
· Patient demonstrates good ·  rehab potential due to higher previous functional level. Reason for Services/Other Comments: 
· Patient continues to require skilled intervention due to functional mobility and balance. · . Use of outcome tool(s) and clinical judgement create a POC that gives a: Clear prediction of patient's progress: LOW COMPLEXITY  
  
 
 
 
TREATMENT:  
(In addition to Assessment/Re-Assessment sessions the following treatments were rendered) Pre-treatment Symptoms/Complaints:  No complaints Pain: Initial:  
   Post Session:  0 Therapeutic Activity: (    14 minutes): Therapeutic activities including Chair transfers, Ambulation on level ground to improve mobility, strength, balance and coordination. Required minimal Safety awareness training; Tactile cues; Verbal cues to promote static and dynamic balance in standing and proper use of RW for transfer/ambulation . Therapeutic Exercise: (  10 minutes):  Exercises per grid below to improve mobility and strength. Required minimal visual and verbal cues to promote proper body alignment, promote proper body posture and promote proper body mechanics. Progressed range, repetitions and complexity of movement as indicated. Date: 
8/12/19 Date: 
8/14/19 Date: Activity/Exercise Parameters Parameters Parameters Marching 1 x 15 B 10 X B    
LAQ 1 x 10 B 10 X B   
APs 1 x 20 B 10 X B Heel slides  10 X B Hip abduction  10 X B Braces/Orthotics/Lines/Etc:  
· none Treatment/Session Assessment:   
· Response to Treatment:  Furthered gait distance ambulating 60 ft with RW and CGA · Interdisciplinary Collaboration:  
o Physical Therapist 
o Registered Nurse · After treatment position/precautions:  
o Up in chair 
o Bed/Chair-wheels locked 
o Bed in low position 
o Call light within reach · Compliance with Program/Exercises: Compliant most of the time · Recommendations/Intent for next treatment session: \"Next visit will focus on advancements to more challenging activities and reduction in assistance provided\". Total Treatment Duration: PT Patient Time In/Time Out Time In: 1526 Time Out: 0938 Bam Iraheta

## 2019-08-14 NOTE — PROGRESS NOTES
[facility] Progress Note Admit Date:  8/8/2019 Today's Date:  8/14/2019 CC:  Elevated liver chemistry Subjective:  
 
Patient remains in 8th floor room, no complaints. To get PICC line for outpatient antibiotics. Denies abdominal pain, fever, chills. Medications:  
Current Facility-Administered Medications Medication Dose Route Frequency  metoprolol tartrate (LOPRESSOR) tablet 12.5 mg  12.5 mg Oral Q12H  
 vancomycin (VANCOCIN) 1,250 mg in dextrose 5% 250 mL IVPB  1,250 mg IntraVENous Q24H  pantoprazole (PROTONIX) tablet 40 mg  40 mg Oral ACB&D  
 losartan (COZAAR) tablet 100 mg  100 mg Oral DAILY  meropenem (MERREM) 500 mg in 0.9% sodium chloride (MBP/ADV) 50 mL  500 mg IntraVENous Q8H  
 lip protectant (BLISTEX) ointment 1 Each  1 Each Topical PRN  
 0.9% sodium chloride infusion 250 mL  250 mL IntraVENous PRN  
 insulin lispro (HUMALOG) injection   SubCUTAneous AC&HS  insulin glargine (LANTUS) injection 50 Units  50 Units SubCUTAneous QHS  sodium chloride (NS) flush 5-40 mL  5-40 mL IntraVENous Q8H  
 sodium chloride (NS) flush 5-40 mL  5-40 mL IntraVENous PRN  
 ondansetron (ZOFRAN) injection 4 mg  4 mg IntraVENous Q4H PRN  
 dextrose 40% (GLUTOSE) oral gel 1 Tube  15 g Oral PRN  
 glucagon (GLUCAGEN) injection 1 mg  1 mg IntraMUSCular PRN  
 dextrose (D50W) injection syrg 12.5-25 g  25-50 mL IntraVENous PRN Review of Systems: ROS was obtained, with pertinent positives as listed above. No chest pain or SOB. Diet:  Diabetic regular Objective:  
Vitals: 
Visit Vitals /68 Pulse (!) 54 Temp 97.7 °F (36.5 °C) Resp 16 Ht 5' 3\" (1.6 m) Wt 91.7 kg (202 lb 3.2 oz) SpO2 96% BMI 35.82 kg/m² Intake/Output: 
08/14 0701 - 08/14 1900 In: -  
Out: 919 [XDKSB:278] 08/12 1901 - 08/14 0700 In: 1898.3 [P.O.:960; I.V.:938.3] Out: 1100 [Urine:1100] Exam: 
General appearance: alert, cooperative, no distress Lungs: clear to auscultation bilaterally anteriorly Heart: regular rate and rhythm Abdomen: soft, non-tender. Bowel sounds normal. No masses, no organomegaly Extremities: extremities normal, atraumatic, no cyanosis or edema Neuro:  alert and oriented Data Review (Labs):   
Recent Labs 08/14/19 
9496 08/13/19 
7563 WBC 10.6  --   
HGB 9.5*  --   
HCT 29.6*  --   
  --   
MCV 93.4  --   
 141  
K 4.3 4.1 * 111* CO2 28 26 BUN 20 22 CREA 1.20* 1.20* CA 8.1* 8.1*  
* 186* Endoscopic Retrograde Cholangiopancreatogram  
DATE of PROCEDURE: 8/10/2019  
POSTPROCEDURE DIAGNOSIS: 
1. Possible cholangitis MEDICATIONS ADMINISTERED:  GETA  
INSTRUMENT: [id number]  
PROCEDURE:  After obtaining informed consent, the patient was placed in prone position on the fluoroscopy table. The patient was then sedated. The endoscope was passed under indirect technique to the oropharynx and gently passed into the esophagus. The endoscope was passed to the ampulla. Limited views of the stomach and duodenum were obtained. After the procedure, the patient was taken to the recovery area in stable condition.  
Ampulla: The ampulla was located in the expected position and was edematous and bulging into the second portion of the duodenum. There was evidence of prior sphincterotomy with no active bleeding.   
Cannulation: Cannulation performed on first attempt with the short nose traction sphincterotome preloaded with a 0.035 inch guidewire which was used for deep cannulation of the biliary tree. Contrast was injected resulting in complete opacification.   
Cholangiogram: 
Initial cholangiogram demonstrated a dilated CBD with apparent stenosis in the distal CBD, likely from the edematous ampulla.  After cannulation, when the tome was lifted, thick black bile with small wisps of white pus were seen.   
Interventions:  
The CBD was swept multiple times with the 12 mm biliary extraction balloon, producing a thick black bile/sludge but no stones. There was no evidence of contrast extravasation. The intrahepatic biliary tree appeared normal. Final sweeps were clear and produced clear green bile.   
The bile duct was observed under fluoroscopy, and contrast did not appear to drain. There appeared to be a smooth cutoff in the distal CBD as seen on image 5, likely from the edematous ampulla.   
Next, one 10 Fr x 9 cm plastic biliary stent (7 cm not available) was placed in the common bile duct. Green bile was seen flowing through the stent. The stent was placed in good position.    
Estimated Blood Loss: 0 cc-min  
ASSESSMENT: 
1. Possible Cholangitis 2. Ampullary stenosis and edema 3. Placement of 10 Fr biliary stent   
PLAN: 
1. Follow up with inpatient team  
2. Follow LFTs 3. Continue antibiotic therapy, follow microbiology culture data 4. If she declines clinically, consider urinary source as she has history of ESBL E.coli in the urine 5. Repeat ERCP in 4-6 weeks for stent management  
  
 
 
 
 
Assessment:  
 
Active Problems: 
  Uncontrolled type 2 diabetes mellitus with hyperglycemia ([address] (6/15/2015) Chronic kidney disease, stage III (moderate) (HCC) (6/15/2015) Acute renal failure (ARF) ([address] (8/8/2019) Elevated liver function tests (8/8/2019) Anemia (8/8/2019) Right lower quadrant abdominal pain (8/8/2019) Gram-negative bacteremia (8/9/2019) Acute pancreatitis (8/12/2019) 79 yo female presented in septic shock 2 weeks after ERCP, with labs suggesting ischemic hepatitis, pancreatitis with PRATIMA. Non contrast CT unrevealing, ultrasound with dopplers ruled out mesenteric vascular clot. She has bacteremia and ESBL-producing E. Coli in urine culture from 8/8 but UA generally bland on admission and no dysuria.  ERCP 8/10 to evaluate and treat for possible cholangitis with stent placed.   Abx broadened to cover for ESBL. Afebrile. Urine culture on admission with ecoli. ID consult reviewed. Leukocytosis resolved. Blood culture 8/12 x 2 with no growth at this point, following initial cultures with enterococcus faecalis and e coli on original cultures 8/8/19. Plan: 1. Remains on Vancomycin, Merrem; to transition Merrem to Ertapenem at discharge x 3 weeks, then given Fosfomycin 3 g po q 3d until ERCP with stent removal 
2. Leukocytosis resolved. 3.  Discharge to SNF in next few days 4. Repeat ERCP in 4-6 weeks for stent removal unless needs to be removed earlier Patient is seen and examined in collaboration with Dr. Byron Cohen. Assessment and plan as per Dr. Byron Cohen.  
Ksenia Hernadez NP

## 2019-08-14 NOTE — PROGRESS NOTES
Pt is resting quietly in bed. Respirations present. Room air. No s/s of distress. [facility]

## 2019-08-14 NOTE — PROGRESS NOTES
Hospitalist Progress Note 2019 Admit Date: 2019 11:57 AM  
NAME: Kortney Lambert :  1940 MRN:  [id number] Attending: Pham Figueredo MD 
PCP:  Omaira Hwang MD 
 
SUBJECTIVE:  
 
Kortney Lambert is a 78years old female with hx of CABG, HTN, HLD, CKD-3, s/p cholecystectomy, GERD admitted on 19 for sepsis, acute pancreatitis, ischemic/shock liver, lactic acidosis, PRATIMA, transaminits. Pt had a recent ERCP on  for dilated CBD with only small stone. Pt also has ESBL E.coli and Enterococcus in urine culture and GNR on blood culture. Pt likely developed post ERCP pancreatitis and possible cholangitis. Pt had repeat ERCP on 8/10 with placement of CBD stent. : 
Pt continues to feel better No fever, chills, chest pain, SOB, cough, headache, urinary complaints. Review of Systems negative with exception of pertinent positives noted above PHYSICAL EXAM  
 
 
Visit Vitals /70 Pulse (!) 59 Temp 98 °F (36.7 °C) Resp 18 Ht 5' 3\" (1.6 m) Wt 91.7 kg (202 lb 3.2 oz) SpO2 96% BMI 35.82 kg/m² Temp (24hrs), Av.1 °F (36.7 °C), Min:97.6 °F (36.4 °C), Max:98.9 °F (37.2 °C) Oxygen Therapy O2 Sat (%): 96 % (19 0744) Pulse via Oximetry: 79 beats per minute (19 1245) O2 Device: Room air (19 0756) O2 Flow Rate (L/min): 2 l/min (19 0640) FIO2 (%): 28 % (08/10/19 0750) Intake/Output Summary (Last 24 hours) at 2019 4706 Last data filed at 2019 0667 Gross per 24 hour Intake 720 ml Output 1100 ml Net -380 ml General: NAD, elderly Head:  Atraumatic Normocephalic. Eyes:  PERRLA, EOMI, Anicteric. ENT:  No discharges/lesions. Lungs:  CTA Bilaterally. CVS:  Regular rate and rhythm,  No murmur, rub, or gallop, No JVD, No lower   extremity edema. Abdomen: Soft, Non distended, Non tender, Positive bowel sounds. MSK:  No deformities, lesions, Spontaneously moves extremities. Neurologic:  GCS 15, no motor or sensory deficits Psychiatry:      No anxiety/Depression Skin:   No rash/lesions. Good skin turgor Heme/Lymph/Immune:  No petechiae, ecchymoses, overt signs of bleeding or    lymphadenopathy noted. Recent Results (from the past 24 hour(s)) GLUCOSE, POC Collection Time: 08/13/19 11:32 AM  
Result Value Ref Range Glucose (POC) 230 (H) 65 - 100 mg/dL GLUCOSE, POC Collection Time: 08/13/19  4:01 PM  
Result Value Ref Range Glucose (POC) 161 (H) 65 - 100 mg/dL GLUCOSE, POC Collection Time: 08/13/19  9:01 PM  
Result Value Ref Range Glucose (POC) 168 (H) 65 - 100 mg/dL GLUCOSE, POC Collection Time: 08/14/19  5:39 AM  
Result Value Ref Range Glucose (POC) 174 (H) 65 - 100 mg/dL Imaging Rip Todd Alegria All diagnostic imaging personally reviewed by me. ASSESSMENT Hospital Problems as of 8/14/2019 Date Reviewed: 8/10/2019 Codes Class Noted - Resolved POA Acute pancreatitis ICD-10-CM: K85.90 ICD-9-CM: 023.7  8/12/2019 - Present Unknown Gram-negative bacteremia ICD-10-CM: R78.81 ICD-9-CM: 790.7, 041.85  8/9/2019 - Present Yes Acute renal failure (ARF) (HCC) ICD-10-CM: N17.9 ICD-9-CM: 584.9  8/8/2019 - Present Yes Elevated liver function tests ICD-10-CM: R94.5 ICD-9-CM: 790.6  8/8/2019 - Present Yes Anemia ICD-10-CM: D64.9 ICD-9-CM: 285.9  8/8/2019 - Present Yes Right lower quadrant abdominal pain ICD-10-CM: R10.31 ICD-9-CM: 789.03  8/8/2019 - Present Yes Uncontrolled type 2 diabetes mellitus with hyperglycemia (HCC) (Chronic) ICD-10-CM: E11.65 ICD-9-CM: 250.02  6/15/2015 - Present Yes Chronic kidney disease, stage III (moderate) (HCC) (Chronic) ICD-10-CM: N18.3 ICD-9-CM: 585.3  6/15/2015 - Present Yes * (Principal) RESOLVED: [facility] ICD-10-CM: A41.9, R65.21 ICD-9-CM: 038.9, 785.52, 995.92  8/8/2019 - 8/12/2019 Yes Plan: - repeat Blood cultures from 8/12 negative to date, TTE negative ESBL E.coli and Enterococcus in Urine and blood culture from 8/8/19 Continue IV Merrem and Vancomycin ID recommends transition to Ertapenem for 3 weeks followed by Fosfomycin q3days until ERCP and stent removal 
- hypernatremia: resolved 
- sepsis: resolving, hemodynamically stable - PRATIMA: resolving, Cr 1.20 now. Trend daily BMP 
- Transamnitis with shock liver: liver enzymes are normalizing 
- pancreatitis: resolving, no abdominal pain, nausea/vomiting. Pt is tolerating diet. - GI recommends ERCP in 4-6 weeks for stent removal 
- PT/OT eval 
- oral PPI 
 
DVT Prophylaxis: SCD's Dispo: pending until antibiotic duration is finalized Gema Mckeon MD

## 2019-08-14 NOTE — PROGRESS NOTES
Pt is sitting up in chair, watching tv. Pt voices no complaints or concerns at this time. SBAR end of shift report given to oncoming RN.

## 2019-08-15 LAB
ANION GAP SERPL CALC-SCNC: 4 MMOL/L (ref 7–16)
BACTERIA SPEC CULT: ABNORMAL
BUN SERPL-MCNC: 19 MG/DL (ref 8–23)
CALCIUM SERPL-MCNC: 8.1 MG/DL (ref 8.3–10.4)
CHLORIDE SERPL-SCNC: 112 MMOL/L (ref 98–107)
CO2 SERPL-SCNC: 28 MMOL/L (ref 21–32)
CREAT SERPL-MCNC: 1.04 MG/DL (ref 0.6–1)
ERYTHROCYTE [DISTWIDTH] IN BLOOD BY AUTOMATED COUNT: 15.8 % (ref 11.9–14.6)
GLUCOSE BLD STRIP.AUTO-MCNC: 102 MG/DL (ref 65–100)
GLUCOSE BLD STRIP.AUTO-MCNC: 145 MG/DL (ref 65–100)
GLUCOSE BLD STRIP.AUTO-MCNC: 201 MG/DL (ref 65–100)
GLUCOSE BLD STRIP.AUTO-MCNC: 224 MG/DL (ref 65–100)
GLUCOSE SERPL-MCNC: 127 MG/DL (ref 65–100)
HCT VFR BLD AUTO: 27.1 % (ref 35.8–46.3)
HGB BLD-MCNC: 8.6 G/DL (ref 11.7–15.4)
MCH RBC QN AUTO: 30.3 PG (ref 26.1–32.9)
MCHC RBC AUTO-ENTMCNC: 31.7 G/DL (ref 31.4–35)
MCV RBC AUTO: 95.4 FL (ref 79.6–97.8)
NRBC # BLD: 0 K/UL (ref 0–0.2)
PLATELET # BLD AUTO: 173 K/UL (ref 150–450)
PMV BLD AUTO: 11.6 FL (ref 9.4–12.3)
POTASSIUM SERPL-SCNC: 4.1 MMOL/L (ref 3.5–5.1)
RBC # BLD AUTO: 2.84 M/UL (ref 4.05–5.2)
SERVICE CMNT-IMP: ABNORMAL
SODIUM SERPL-SCNC: 144 MMOL/L (ref 136–145)
WBC # BLD AUTO: 9 K/UL (ref 4.3–11.1)

## 2019-08-15 PROCEDURE — 74011636637 HC RX REV CODE- 636/637: Performed by: INTERNAL MEDICINE

## 2019-08-15 PROCEDURE — 74011000258 HC RX REV CODE- 258: Performed by: HOSPITALIST

## 2019-08-15 PROCEDURE — 74011250637 HC RX REV CODE- 250/637: Performed by: INTERNAL MEDICINE

## 2019-08-15 PROCEDURE — 82962 GLUCOSE BLOOD TEST: CPT

## 2019-08-15 PROCEDURE — 74011250636 HC RX REV CODE- 250/636: Performed by: HOSPITALIST

## 2019-08-15 PROCEDURE — 74011250637 HC RX REV CODE- 250/637: Performed by: HOSPITALIST

## 2019-08-15 PROCEDURE — 74011250636 HC RX REV CODE- 250/636: Performed by: INTERNAL MEDICINE

## 2019-08-15 PROCEDURE — 85027 COMPLETE CBC AUTOMATED: CPT

## 2019-08-15 PROCEDURE — 74011000258 HC RX REV CODE- 258: Performed by: INTERNAL MEDICINE

## 2019-08-15 PROCEDURE — 65270000029 HC RM PRIVATE

## 2019-08-15 PROCEDURE — 36592 COLLECT BLOOD FROM PICC: CPT

## 2019-08-15 PROCEDURE — 80048 BASIC METABOLIC PNL TOTAL CA: CPT

## 2019-08-15 RX ADMIN — Medication 10 ML: at 21:40

## 2019-08-15 RX ADMIN — Medication 10 ML: at 06:00

## 2019-08-15 RX ADMIN — PANTOPRAZOLE SODIUM 40 MG: 40 TABLET, DELAYED RELEASE ORAL at 16:54

## 2019-08-15 RX ADMIN — SODIUM CHLORIDE, PRESERVATIVE FREE 300 UNITS: 5 INJECTION INTRAVENOUS at 21:38

## 2019-08-15 RX ADMIN — SODIUM CHLORIDE, PRESERVATIVE FREE 300 UNITS: 5 INJECTION INTRAVENOUS at 16:55

## 2019-08-15 RX ADMIN — METOPROLOL TARTRATE 12.5 MG: 25 TABLET ORAL at 21:40

## 2019-08-15 RX ADMIN — SODIUM CHLORIDE, PRESERVATIVE FREE 300 UNITS: 5 INJECTION INTRAVENOUS at 06:01

## 2019-08-15 RX ADMIN — VANCOMYCIN HYDROCHLORIDE 1250 MG: 10 INJECTION, POWDER, LYOPHILIZED, FOR SOLUTION INTRAVENOUS at 09:22

## 2019-08-15 RX ADMIN — PANTOPRAZOLE SODIUM 40 MG: 40 TABLET, DELAYED RELEASE ORAL at 06:03

## 2019-08-15 RX ADMIN — MEROPENEM 500 MG: 500 INJECTION, POWDER, FOR SOLUTION INTRAVENOUS at 01:57

## 2019-08-15 RX ADMIN — INSULIN GLARGINE 50 UNITS: 100 INJECTION, SOLUTION SUBCUTANEOUS at 21:38

## 2019-08-15 RX ADMIN — Medication 10 ML: at 16:54

## 2019-08-15 RX ADMIN — Medication 10 ML: at 06:03

## 2019-08-15 RX ADMIN — INSULIN LISPRO 6 UNITS: 100 INJECTION, SOLUTION INTRAVENOUS; SUBCUTANEOUS at 21:39

## 2019-08-15 RX ADMIN — MEROPENEM 500 MG: 500 INJECTION, POWDER, FOR SOLUTION INTRAVENOUS at 09:22

## 2019-08-15 RX ADMIN — METOPROLOL TARTRATE 12.5 MG: 25 TABLET ORAL at 09:22

## 2019-08-15 RX ADMIN — LOSARTAN POTASSIUM 100 MG: 50 TABLET ORAL at 09:22

## 2019-08-15 RX ADMIN — MEROPENEM 500 MG: 500 INJECTION, POWDER, FOR SOLUTION INTRAVENOUS at 16:55

## 2019-08-15 RX ADMIN — INSULIN LISPRO 6 UNITS: 100 INJECTION, SOLUTION INTRAVENOUS; SUBCUTANEOUS at 16:54

## 2019-08-15 RX ADMIN — Medication 10 ML: at 16:55

## 2019-08-15 NOTE — PROGRESS NOTES
Nutrition LOS Note: Day 7  Assessment  DIET DIABETIC CONSISTENT CARB Regular    Food,Nutrition, and Pertinent History: The patient is noted to have a h/o diabetes, CKD stage 3, CAD and GERD. She is currently admitted for acute renal failure. She is eating ok with a decent appetite. She is s/p ERCP. She denies any po difficulties at this time. She has no questions or concerns regarding food or nutrition at this time. Anthropometrics: Height: 5' 3\" (160 cm), Weight Source: Bed, Weight: 93.5 kg (206 lb 3.2 oz), Body mass index is 36.53 kg/m². BMI class of obese. Macronutrient Needs (based on IBW of 52.3 kg for CKD):  · EER:  1371-7250 kcal /day (25-30 kcal/kg ideal BW)  · EPR:  42-52 grams protein/day (0.8-1 grams/kg IBW)  Intake/Comparative Standards:  Average intake for past 6 day(s)/11 recorded meal(s): 76%. This potentially meets ~100% of kcal and ~100% of protein needs    Nutrition Diagnosis: No nutrition diagnosis at this time    Intervention:   Meals and snacks: Continue current diet. Discharge nutrition plan: No discharge needs identified    Gomez Sanders.  Esha Milligan [address], [address] [place] [address],    [phone]

## 2019-08-15 NOTE — PROGRESS NOTES
Hospitalist Progress Note 8/15/2019 Admit Date: 2019 11:57 AM  
NAME: Duane Hickey :  1940 MRN:  [id number] Attending: Wanda Taylor MD 
PCP:  Geoff Bruner MD 
 
SUBJECTIVE:  
 
Duane Hickey is a 78years old female with hx of CABG, HTN, HLD, CKD-3, s/p cholecystectomy, GERD admitted on 19 for sepsis, acute pancreatitis, ischemic/shock liver, lactic acidosis, PRATIMA, transaminits. Pt had a recent ERCP on  for dilated CBD with only small stone. Pt also has ESBL E.coli and Enterococcus in urine culture and GNR on blood culture. Pt likely developed post ERCP pancreatitis and possible cholangitis. Pt had repeat ERCP on 8/10 with placement of CBD stent. 8/15: 
Pt reports feeling better. No overnight issues. No chest pain, nausea/vomiting, fever, chills. Review of Systems negative with exception of pertinent positives noted above PHYSICAL EXAM  
 
 
Visit Vitals /77 Pulse 60 Temp 98.2 °F (36.8 °C) Resp 18 Ht 5' 3\" (1.6 m) Wt 93.5 kg (206 lb 3.2 oz) SpO2 99% BMI 36.53 kg/m² Temp (24hrs), Av.9 °F (36.6 °C), Min:97.4 °F (36.3 °C), Max:98.2 °F (36.8 °C) Oxygen Therapy O2 Sat (%): 99 % (08/15/19 0711) Pulse via Oximetry: 79 beats per minute (19 1245) O2 Device: Room air (08/15/19 3831) O2 Flow Rate (L/min): 2 l/min (19 0640) FIO2 (%): 28 % (08/10/19 0750) Intake/Output Summary (Last 24 hours) at 8/15/2019 0801 Last data filed at 8/15/2019 5238 Gross per 24 hour Intake 840 ml Output 1950 ml Net -1110 ml General: NAD, elderly Head:  Atraumatic Normocephalic. Eyes:  PERRLA, EOMI, Anicteric. ENT:  No discharges/lesions. Lungs:  CTA Bilaterally. CVS:  Regular rate and rhythm,  No murmur, rub, or gallop, No JVD, No lower   extremity edema. Abdomen: Soft, Non distended, Non tender, Positive bowel sounds. MSK:  No deformities, lesions, Spontaneously moves extremities. Neurologic:  GCS 15, no motor or sensory deficits Psychiatry:      No anxiety/Depression Skin:   No rash/lesions. Good skin turgor Heme/Lymph/Immune:  No petechiae, ecchymoses, overt signs of bleeding or    lymphadenopathy noted. Recent Results (from the past 24 hour(s)) METABOLIC PANEL, BASIC Collection Time: 08/14/19  9:36 AM  
Result Value Ref Range Sodium 142 136 - 145 mmol/L Potassium 4.3 3.5 - 5.1 mmol/L Chloride 110 (H) 98 - 107 mmol/L  
 CO2 28 21 - 32 mmol/L Anion gap 4 (L) 7 - 16 mmol/L Glucose 119 (H) 65 - 100 mg/dL BUN 20 8 - 23 MG/DL Creatinine 1.20 (H) 0.6 - 1.0 MG/DL  
 GFR est AA 55 (L) >60 ml/min/1.73m2 GFR est non-AA 46 (L) >60 ml/min/1.73m2 Calcium 8.1 (L) 8.3 - 10.4 MG/DL  
CBC W/O DIFF Collection Time: 08/14/19  9:36 AM  
Result Value Ref Range WBC 10.6 4.3 - 11.1 K/uL  
 RBC 3.17 (L) 4.05 - 5.2 M/uL HGB 9.5 (L) 11.7 - 15.4 g/dL HCT 29.6 (L) 35.8 - 46.3 % MCV 93.4 79.6 - 97.8 FL  
 MCH 30.0 26.1 - 32.9 PG  
 MCHC 32.1 31.4 - 35.0 g/dL  
 RDW 15.6 (H) 11.9 - 14.6 % PLATELET 746 602 - 985 K/uL MPV 11.6 9.4 - 12.3 FL ABSOLUTE NRBC 0.00 0.0 - 0.2 K/uL HEPATIC FUNCTION PANEL Collection Time: 08/14/19  9:36 AM  
Result Value Ref Range Protein, total 5.4 (L) 6.3 - 8.2 g/dL Albumin 2.2 (L) 3.2 - 4.6 g/dL Globulin 3.2 2.3 - 3.5 g/dL A-G Ratio 0.7 (L) 1.2 - 3.5 Bilirubin, total 0.4 0.2 - 1.1 MG/DL Bilirubin, direct 0.2 <0.4 MG/DL Alk. phosphatase 366 (H) 50 - 136 U/L  
 AST (SGOT) 29 15 - 37 U/L  
 ALT (SGPT) 245 (H) 12 - 65 U/L  
GLUCOSE, POC Collection Time: 08/14/19 11:10 AM  
Result Value Ref Range Glucose (POC) 106 (H) 65 - 100 mg/dL GLUCOSE, POC Collection Time: 08/14/19  4:13 PM  
Result Value Ref Range Glucose (POC) 182 (H) 65 - 100 mg/dL GLUCOSE, POC Collection Time: 08/14/19  8:01 PM  
Result Value Ref Range Glucose (POC) 217 (H) 65 - 100 mg/dL GLUCOSE, POC  
 Collection Time: 08/15/19  5:15 AM  
Result Value Ref Range Glucose (POC) 145 (H) 65 - 100 mg/dL METABOLIC PANEL, BASIC Collection Time: 08/15/19  6:01 AM  
Result Value Ref Range Sodium 144 136 - 145 mmol/L Potassium 4.1 3.5 - 5.1 mmol/L Chloride 112 (H) 98 - 107 mmol/L  
 CO2 28 21 - 32 mmol/L Anion gap 4 (L) 7 - 16 mmol/L Glucose 127 (H) 65 - 100 mg/dL BUN 19 8 - 23 MG/DL Creatinine 1.04 (H) 0.6 - 1.0 MG/DL  
 GFR est AA >60 >60 ml/min/1.73m2 GFR est non-AA 54 (L) >60 ml/min/1.73m2 Calcium 8.1 (L) 8.3 - 10.4 MG/DL  
CBC W/O DIFF Collection Time: 08/15/19  6:01 AM  
Result Value Ref Range WBC 9.0 4.3 - 11.1 K/uL  
 RBC 2.84 (L) 4.05 - 5.2 M/uL HGB 8.6 (L) 11.7 - 15.4 g/dL HCT 27.1 (L) 35.8 - 46.3 % MCV 95.4 79.6 - 97.8 FL  
 MCH 30.3 26.1 - 32.9 PG  
 MCHC 31.7 31.4 - 35.0 g/dL  
 RDW 15.8 (H) 11.9 - 14.6 % PLATELET 849 284 - 212 K/uL MPV 11.6 9.4 - 12.3 FL ABSOLUTE NRBC 0.00 0.0 - 0.2 K/uL Imaging Kayleigh Solorzano All diagnostic imaging personally reviewed by me. ASSESSMENT Hospital Problems as of 8/15/2019 Date Reviewed: 8/10/2019 Codes Class Noted - Resolved POA Acute pancreatitis ICD-10-CM: K85.90 ICD-9-CM: 754.4  8/12/2019 - Present Unknown Gram-negative bacteremia ICD-10-CM: R78.81 ICD-9-CM: 790.7, 041.85  8/9/2019 - Present Yes Acute renal failure (ARF) (HCC) ICD-10-CM: N17.9 ICD-9-CM: 584.9  8/8/2019 - Present Yes Elevated liver function tests ICD-10-CM: R94.5 ICD-9-CM: 790.6  8/8/2019 - Present Yes Anemia ICD-10-CM: D64.9 ICD-9-CM: 285.9  8/8/2019 - Present Yes Right lower quadrant abdominal pain ICD-10-CM: R10.31 ICD-9-CM: 789.03  8/8/2019 - Present Yes Uncontrolled type 2 diabetes mellitus with hyperglycemia (HCC) (Chronic) ICD-10-CM: E11.65 ICD-9-CM: 250.02  6/15/2015 - Present Yes  Chronic kidney disease, stage III (moderate) (HCC) (Chronic) ICD-10-CM: N18.3 ICD-9-CM: 585.3  6/15/2015 - Present Yes * (Principal) RESOLVED: Septic shock ([facility] [address] ICD-10-CM: A41.9, R65.21 ICD-9-CM: 038.9, 785.52, 995.92  8/8/2019 - 8/12/2019 Yes Plan: 
- repeat Blood cultures from 8/12 negative to date, TTE negative. PICC line placed ESBL E.coli and Enterococcus in Urine and blood culture from 8/8/19 Continue IV Merrem and Vancomycin FOR 3 WEEKS, 9/6/19, then Fosfamycin 3 gm po q3days until ERCP and stent removal. 
 
- hypernatremia: resolved 
- sepsis: resolving, hemodynamically stable - PRATIMA: resolving, Cr 1.20 now. Trend daily BMP 
- Transamnitis with shock liver: liver enzymes are normalizing 
- pancreatitis: resolving, no abdominal pain, nausea/vomiting. Pt is tolerating diet. - GI recommends ERCP in 4-6 weeks for stent removal 
- PT/OT eval 
- oral PPI 
 
DVT Prophylaxis: SCD's Dispo: pending until antibiotic duration is finalized Gee Jernigan MD

## 2019-08-15 NOTE — PROGRESS NOTES
[facility] Progress Note Admit Date:  8/8/2019 Today's Date:  8/15/2019 CC:  Elevated liver chemistry Subjective:  
 
Patient with no abdominal pain but reports only fair appetite. Hoping for discharge to extended care facility soon. No chills, sweats. Medications:  
Current Facility-Administered Medications Medication Dose Route Frequency  sodium chloride (NS) flush 10 mL  10 mL InterCATHeter Q8H  
 heparin (porcine) pf 300 Units  300 Units InterCATHeter Q8H  
 sodium chloride (NS) flush 10 mL  10 mL InterCATHeter PRN  
 heparin (porcine) pf 300 Units  300 Units InterCATHeter PRN  
 metoprolol tartrate (LOPRESSOR) tablet 12.5 mg  12.5 mg Oral Q12H  
 vancomycin (VANCOCIN) 1,250 mg in dextrose 5% 250 mL IVPB  1,250 mg IntraVENous Q24H  pantoprazole (PROTONIX) tablet 40 mg  40 mg Oral ACB&D  
 losartan (COZAAR) tablet 100 mg  100 mg Oral DAILY  meropenem (MERREM) 500 mg in 0.9% sodium chloride (MBP/ADV) 50 mL  500 mg IntraVENous Q8H  
 lip protectant (BLISTEX) ointment 1 Each  1 Each Topical PRN  
 0.9% sodium chloride infusion 250 mL  250 mL IntraVENous PRN  
 insulin lispro (HUMALOG) injection   SubCUTAneous AC&HS  insulin glargine (LANTUS) injection 50 Units  50 Units SubCUTAneous QHS  sodium chloride (NS) flush 5-40 mL  5-40 mL IntraVENous Q8H  
 sodium chloride (NS) flush 5-40 mL  5-40 mL IntraVENous PRN  
 ondansetron (ZOFRAN) injection 4 mg  4 mg IntraVENous Q4H PRN  
 dextrose 40% (GLUTOSE) oral gel 1 Tube  15 g Oral PRN  
 glucagon (GLUCAGEN) injection 1 mg  1 mg IntraMUSCular PRN  
 dextrose (D50W) injection syrg 12.5-25 g  25-50 mL IntraVENous PRN Review of Systems: ROS was obtained, with pertinent positives as listed above. No chest pain or SOB. Diet:  Diabetic regular Objective:  
Vitals: 
Visit Vitals /77 Pulse 60 Temp 98.2 °F (36.8 °C) Resp 18 Ht 5' 3\" (1.6 m) Wt 93.5 kg (206 lb 3.2 oz) SpO2 99% BMI 36.53 kg/m² Intake/Output: 
No intake/output data recorded. 08/13 1901 - 08/15 0700 In: 1080 [P.O.:1080] Out: 2750 [Urine:2750] Exam: 
General appearance: alert, cooperative, no distress Lungs: clear to auscultation bilaterally anteriorly Heart: regular rate and rhythm Abdomen: soft, non-tender. Bowel sounds normal. No masses, no organomegaly Extremities: extremities normal, atraumatic, no cyanosis or edema Neuro:  alert and oriented Data Review (Labs):   
Recent Labs 08/15/19 
4875 08/14/19 
9682 08/13/19 
9685 WBC 9.0 10.6  --   
HGB 8.6* 9.5*  --   
HCT 27.1* 29.6*  --   
 156  --   
MCV 95.4 93.4  --   
 142 141  
K 4.1 4.3 4.1 * 110* 111* CO2 28 28 26 BUN 19 20 22 CREA 1.04* 1.20* 1.20* CA 8.1* 8.1* 8.1*  
* 119* 186* AP  --  366*  --   
SGOT  --  29  --   
ALT  --  245*  --   
TBILI  --  0.4  --   
CBIL  --  0.2  --   
ALB  --  2.2*  --   
TP  --  5.4*  --   
 
 
Endoscopic Retrograde Cholangiopancreatogram  
DATE of PROCEDURE: 8/10/2019  
POSTPROCEDURE DIAGNOSIS: 
1. Possible cholangitis MEDICATIONS ADMINISTERED:  GETA  
INSTRUMENT: [id number]  
PROCEDURE:  After obtaining informed consent, the patient was placed in prone position on the fluoroscopy table. The patient was then sedated. The endoscope was passed under indirect technique to the oropharynx and gently passed into the esophagus. The endoscope was passed to the ampulla. Limited views of the stomach and duodenum were obtained. After the procedure, the patient was taken to the recovery area in stable condition.  
Ampulla: The ampulla was located in the expected position and was edematous and bulging into the second portion of the duodenum.  There was evidence of prior sphincterotomy with no active bleeding.   
Cannulation: Cannulation performed on first attempt with the short nose traction sphincterotome preloaded with a 0.035 inch guidewire which was used for deep cannulation of the biliary tree. Contrast was injected resulting in complete opacification.   
Cholangiogram: 
Initial cholangiogram demonstrated a dilated CBD with apparent stenosis in the distal CBD, likely from the edematous ampulla. After cannulation, when the tome was lifted, thick black bile with small wisps of white pus were seen.   
Interventions:  
The CBD was swept multiple times with the 12 mm biliary extraction balloon, producing a thick black bile/sludge but no stones. There was no evidence of contrast extravasation. The intrahepatic biliary tree appeared normal. Final sweeps were clear and produced clear green bile.   
The bile duct was observed under fluoroscopy, and contrast did not appear to drain. There appeared to be a smooth cutoff in the distal CBD as seen on image 5, likely from the edematous ampulla.   
Next, one 10 Fr x 9 cm plastic biliary stent (7 cm not available) was placed in the common bile duct. Green bile was seen flowing through the stent. The stent was placed in good position.    
Estimated Blood Loss: 0 cc-min  
ASSESSMENT: 
1. Possible Cholangitis 2. Ampullary stenosis and edema 3. Placement of 10 Fr biliary stent   
PLAN: 
1. Follow up with inpatient team  
2. Follow LFTs 3. Continue antibiotic therapy, follow microbiology culture data 4. If she declines clinically, consider urinary source as she has history of ESBL E.coli in the urine 5. Repeat ERCP in 4-6 weeks for stent management  
  
 
 
 
 
Assessment:  
 
Active Problems: 
  Uncontrolled type 2 diabetes mellitus with hyperglycemia ([facility] [address] (6/15/2015) Chronic kidney disease, stage III (moderate) (HCC) (6/15/2015) Acute renal failure (ARF) ([address] (8/8/2019) Elevated liver function tests (8/8/2019) Anemia (8/8/2019) Right lower quadrant abdominal pain (8/8/2019) Gram-negative bacteremia (8/9/2019) Acute pancreatitis (8/12/2019) 79 yo female presented in septic shock 2 weeks after ERCP, with labs suggesting ischemic hepatitis, pancreatitis with PRATIMA. Non contrast CT unrevealing, ultrasound with dopplers ruled out mesenteric vascular clot. She has bacteremia and ESBL-producing E. Coli in urine culture from 8/8 but UA generally bland on admission and no dysuria.  ERCP 8/10 to evaluate and treat for possible cholangitis with stent placed. Abx broadened to cover for ESBL. Afebrile. Urine culture on admission with ecoli. ID consult reviewed. Leukocytosis resolved. Blood culture 8/12 x 2 with no growth at this point, following initial cultures with enterococcus faecalis and e coli on original cultures 8/8/19. Plan: 1. Remains on Vancomycin, Merrem; to transition Merrem to Ertapenem at discharge x 3 weeks, then given Fosfomycin 3 g po q 3d until ERCP with stent removal 
2. Leukocytosis resolved; hgb down but no evidence of bleeding 3. Discharge to SNF in next few days 4. Repeat ERCP in 4-6 weeks for stent removal unless needs to be removed earlier Patient is seen and examined in collaboration with Dr. Berta Ewing. Assessment and plan as per Dr. Berta Ewing.  
Emily Beck NP

## 2019-08-15 NOTE — PROGRESS NOTES
Patient resting quietly in recliner chair, watching TV. No c/o discomfort at this time. Slight edema noted to right lower extremity. Call light in reach, will monitor.

## 2019-08-15 NOTE — ANESTHESIA POSTPROCEDURE EVALUATION
Procedure(s): ENDOSCOPIC RETROGRADE CHOLANGIOPANCREATOGRAPHY (ERCP) ENDOSCOPIC STONE EXTRACTION/BALLOON SWEEP 
BILIARY STENT PLACEMENT. general 
 
Anesthesia Post Evaluation Multimodal analgesia: multimodal analgesia not used between 6 hours prior to anesthesia start to PACU discharge Patient location during evaluation: PACU Patient participation: complete - patient participated Level of consciousness: awake Pain management: adequate Airway patency: patent Anesthetic complications: no 
Cardiovascular status: acceptable Respiratory status: acceptable Hydration status: acceptable Post anesthesia nausea and vomiting:  none Vitals Value Taken Time /70 8/10/2019  1:46 PM  
Temp 36.4 °C (97.6 °F) 8/10/2019  1:22 PM  
Pulse 88 8/10/2019  1:46 PM  
Resp 16 8/10/2019  1:33 PM  
SpO2 98 % 8/10/2019  1:46 PM

## 2019-08-15 NOTE — PROGRESS NOTES
Patient rested/slept after lying down in bed at approximately 0200. Respirations regular rate & rhythm on room air. Urine output for shift = 750 mL yellow, clear urine. No s/sx of distress. Bed in low & locked position. Call light and personal belongings within reach.

## 2019-08-15 NOTE — PROGRESS NOTES
Bedside shift received from [facility]. Respirations regular rate & rhythm, on room air. No s/sx of distress. Sitting up in chair, watching tv. Chair in low & locked position. Call light and personal belongings within reach.

## 2019-08-15 NOTE — PROGRESS NOTES
Bedside shift report given to ESTIVEN PATTERSON, FAY. Respirations regular rate & rhythm on room air. No s/sx of distress.

## 2019-08-15 NOTE — PROGRESS NOTES
Problem: Risk for Spread of Infection Goal: Prevent transmission of infectious organism to others Description Prevent the transmission of infectious organisms to other patients, staff members, and visitors. 8/15/2019 0207 by Dipti Chino RN Outcome: Progressing Towards Goal 
8/15/2019 0205 by Dipti Chino RN Outcome: Progressing Towards Goal 
  
Problem: Falls - Risk of 
Goal: *Absence of Falls Description Document [place] Fall Risk and appropriate interventions in the flowsheet. 8/15/2019 0207 by Dipti Chino RN Outcome: Progressing Towards Goal 
Note:  
Fall Risk Interventions: 
Mobility Interventions: Patient to call before getting OOB, Strengthening exercises (ROM-active/passive) Medication Interventions: Evaluate medications/consider consulting pharmacy, Patient to call before getting OOB, Teach patient to arise slowly Elimination Interventions: Call light in reach, Patient to call for help with toileting needs, Stay With Me (per policy), Toileting schedule/hourly rounds History of Falls Interventions: Door open when patient unattended, Consult care management for discharge planning, Evaluate medications/consider consulting pharmacy, Investigate reason for fall, Room close to nurse's station Problem: Pressure Injury - Risk of 
Goal: *Prevention of pressure injury Description Document Ashu Scale and appropriate interventions in the flowsheet. 8/15/2019 0207 by Dipti Chino RN Outcome: Progressing Towards Goal 
Note:  
Pressure Injury Interventions: 
Sensory Interventions: Assess changes in LOC, Check visual cues for pain, Keep linens dry and wrinkle-free, Maintain/enhance activity level, Minimize linen layers, Monitor skin under medical devices, Pad between skin to skin, Turn and reposition approx. every two hours (pillows and wedges if needed), Use 30-degree side-lying position Moisture Interventions: Absorbent underpads, Maintain skin hydration (lotion/cream), Minimize layers Activity Interventions: Pressure redistribution bed/mattress(bed type), Increase time out of bed Mobility Interventions: Pressure redistribution bed/mattress (bed type) Nutrition Interventions: Document food/fluid/supplement intake Friction and Shear Interventions: Apply protective barrier, creams and emollients 8/15/2019 0205 by Caleb Casey RN Outcome: Progressing Towards Goal 
Note:  
Pressure Injury Interventions: 
Sensory Interventions: Assess changes in LOC, Check visual cues for pain, Keep linens dry and wrinkle-free, Maintain/enhance activity level, Minimize linen layers, Monitor skin under medical devices, Pad between skin to skin, Turn and reposition approx. every two hours (pillows and wedges if needed), Use 30-degree side-lying position Moisture Interventions: Absorbent underpads, Maintain skin hydration (lotion/cream), Minimize layers Activity Interventions: Pressure redistribution bed/mattress(bed type), Increase time out of bed Mobility Interventions: Pressure redistribution bed/mattress (bed type) Nutrition Interventions: Document food/fluid/supplement intake Friction and Shear Interventions: Apply protective barrier, creams and emollients

## 2019-08-15 NOTE — PROGRESS NOTES
Infectious Disease Progress Note Today's Date: 8/15/2019 Admit Date: 2019 Impression: · Polymicrobial bacteremia: Enterococcus and ESBL E.coli (), source likely biliary. TTE negative · ESBL E.coli/Enterococcus faecium UTI, asymptomatic, UA benign, ?colonization · Cholangitis/ampillary stenosis s/p (8/10) ERCP with stent placement, recent ERCP () with small stone extraction · Non-infectious hepatitis, AST ~5000/ALT >2500 on admission-improving · Leukocytosis, improved · CKD/PRATIMA, improved Plan:  
· Continue Vancomycin: current dose 1250mg IV Q24hrs (aim trough ~15), and Merrem 500mg IV Q8hrs (facility able to provide): plan ~3 weeks, 19, then give Fosfomycin 3g PO Q3days (check for drug pricing prior to PICC removal) until ERCP and stent removal 
Dispo: SNF is planned. Please note ID does not follow patient at SNF, recommend Labs: Qmonday CBC with diff, Creatinine, LFTs, Vanc trough and Q Thursday Creatinine, vanc trough with routine PICC care We will plan to see at EOT: 19 @ 3:00pm 
 
 
Anti-infectives:  
· merrem - · Vanc - Subjective:  
Resting in the chair today, having a better day. No acute complaints, no fever, chills, sweats. Allergies Allergen Reactions  Sulfa (Sulfonamide Antibiotics) Swelling Review of Systems:  A comprehensive review of systems was negative except for that written in the History of Present Illness. Objective:  
 
Visit Vitals /77 Pulse 60 Temp 98.2 °F (36.8 °C) Resp 18 Ht 5' 3\" (1.6 m) Wt 93.5 kg (206 lb 3.2 oz) SpO2 99% BMI 36.53 kg/m² Temp (24hrs), Av.9 °F (36.6 °C), Min:97.4 °F (36.3 °C), Max:98.2 °F (36.8 °C) Lines:  Peripheral IV:   Left arm x2 intact Physical Exam:   
General:  Alert, cooperative, Obese Eyes:  Sclera anicteric. Pupils equally round and reactive to light. Mouth/Throat: Mucous membranes normal, oral pharynx clear Neck: Supple Lungs:   Clear to auscultation bilaterally, good effort CV:  Regular rate and rhythm,no murmur, click, rub or gallop Abdomen:   Soft, non-tender. bowel sounds normal. non-distended Extremities: No cyanosis or edema Skin: Skin color, texture, turgor normal. no acute rash or lesions Musculoskeletal: No swelling or deformity Lines/Devices:  Intact, no erythema, drainage or tenderness Psych: Alert and oriented Data Review: CBC: 
Recent Labs 08/15/19 
0601 08/14/19 
0201 WBC 9.0 10.6 HGB 8.6* 9.5* HCT 27.1* 29.6*  
 156 BMP: 
Recent Labs 08/15/19 
1025 08/14/19 
8101 08/13/19 
5236 CREA 1.04* 1.20* 1.20* BUN 19 20 22  142 141  
K 4.1 4.3 4.1 * 110* 111* CO2 28 28 26 AGAP 4* 4* 4* * 119* 186* LFTS: 
Recent Labs 08/14/19 
3427 TBILI 0.4 * SGOT 29 * TP 5.4* ALB 2.2* Microbiology:  
 
All Micro Results Procedure Component Value Units Date/Time CULTURE, URINE [[id number]]  (Abnormal)  (Susceptibility) Collected:  08/08/19 1252 Order Status:  Completed Specimen:  Urine from [place] Specimen Updated:  08/15/19 0920 Special Requests: NOT GRAY TOP TUBE Culture result:    
  >100,000 COLONIES/mL ESCHERICHIA COLI ** (EXTENDED SPECTRUM BETA LACTAMASE ) **  
     
      
  10,000 to 50,000 COLONIES/mL ENTEROCOCCUS FAECIUM GROUP D  
     
      
  >100,000 COLONIES/mL 2ND STRAIN OF ESCHERICHIA COLI ** (EXTENDED SPECTRUM BETA LACTAMASE ) **  
     
      
  ** MULTI-DRUG RESISTANT ORGANISM ** PATIENT IS A KNOWN ESBL     
 CULTURE, BLOOD [[id number]] Collected:  08/12/19 1109 Order Status:  Completed Specimen:  Blood Updated:  08/15/19 0737 Special Requests: --     
  LEFT Antecubital 
  
  Culture result: NO GROWTH 3 DAYS     
 CULTURE, BLOOD [[id number]] Collected:  08/12/19 1116 Order Status:  Completed Specimen:  Blood Updated:  08/15/19 0745 Special Requests: --     
  RIGHT Antecubital 
  
  Culture result: NO GROWTH 3 DAYS     
 CULTURE, BLOOD [[id number]]  (Abnormal)  (Susceptibility) Collected:  19 1228 Order Status:  Completed Specimen:  Blood Updated:  19 1140 Special Requests: --     
  RIGHT Antecubital 
  
  GRAM STAIN GRAM POSITIVE COCCI     
   GRAM NEGATIVE RODS ANAEROBIC BOTTLE POSITIVE RESULTS VERIFIED, PHONED TO AND READ BACK BY FAY LAM @9915 19 MM RESULTS VERIFIED, PHONED TO AND READ BACK BY NETTIE CANTU RN ON 19 @0750, TA  
     
  Culture result:    
  ENTEROCOCCUS FAECALIS GROUP D  
     
      
  ESCHERICHIA COLI ** (EXTENDED SPECTRUM BETA LACTAMASE ) **  
     
      
  ENTEROCOCCUS SPECIES DETECTED E.COLI DETECTED Test Performed by Multiplex PCR RESULTS VERIFIED, PHONED TO AND READ BACK BY 
[address] ([facility]) RN AT 8881 19 TA PATIENT IS A KNOWN ESBL     
 CULTURE, BLOOD [[id number]]  (Abnormal) Collected:  19 1232 Order Status:  Completed Specimen:  Blood Updated:  19 0701 Special Requests: --     
  LEFT 
FOREARM 
  
  GRAM STAIN GRAM POSITIVE COCCI     
   GRAM NEGATIVE RODS AEROBIC AND ANAEROBIC BOTTLES RESULTS VERIFIED, PHONED TO AND READ BACK BY FAY LAM @7258 19 MM RESULTS VERIFIED, PHONED TO AND READ BACK BY [address]), RN ON 19 @0750, TA  
     
  Culture result:    
  PRESUMPTIVE ENTEROCOCCUS SPECIES  
     
   GRAM NEGATIVE RODS REFER TO ACC [id number] FOR ID AND SUSCEPTIBILITY Imagin/10/19 ERCP IMPRESSION: Biliary intervention with subsequent stent placement in satisfactory 
position. Please refer to the procedural report for further description and 
detail. 19 abdominal duplex Impression: Portal vein is patent.  The celiac, proper hepatic, splenic, and 
 superior mesenteric arteries are patent without evidence of significant stenosis 
greater than 50%. The portal-splenic confluence is patent. 8/9/19 CXR IMPRESSION: Interstitial pulmonary edema and left lower lobe atelectasis or 
pneumonia improved. Signed By: Evgeny Cason NP August 15, 2019

## 2019-08-15 NOTE — PROGRESS NOTES
Problem: Risk for Spread of Infection  Goal: Prevent transmission of infectious organism to others  Description  Prevent the transmission of infectious organisms to other patients, staff members, and visitors. Outcome: Progressing Towards Goal     Problem: Falls - Risk of  Goal: *Absence of Falls  Description  Document Beatris Sinde Fall Risk and appropriate interventions in the flowsheet.   Outcome: Progressing Towards Goal  Note:   Fall Risk Interventions:  Mobility Interventions: Patient to call before getting OOB         Medication Interventions: Patient to call before getting OOB    Elimination Interventions: Call light in reach    History of Falls Interventions: Door open when patient unattended

## 2019-08-15 NOTE — PROGRESS NOTES
Beside shift report received from [facility]  Patient lying in bed  Respirations present  No signs of distress  No needs expressed at this time  Safety measures in place

## 2019-08-16 VITALS
RESPIRATION RATE: 17 BRPM | SYSTOLIC BLOOD PRESSURE: 139 MMHG | OXYGEN SATURATION: 96 % | HEART RATE: 60 BPM | HEIGHT: 63 IN | DIASTOLIC BLOOD PRESSURE: 77 MMHG | BODY MASS INDEX: 34.94 KG/M2 | TEMPERATURE: 97.7 F | WEIGHT: 197.2 LBS

## 2019-08-16 LAB
ALBUMIN SERPL-MCNC: 2.1 G/DL (ref 3.2–4.6)
ALBUMIN/GLOB SERPL: 0.7 {RATIO} (ref 1.2–3.5)
ALP SERPL-CCNC: 405 U/L (ref 50–136)
ALT SERPL-CCNC: 143 U/L (ref 12–65)
ANION GAP SERPL CALC-SCNC: 6 MMOL/L (ref 7–16)
AST SERPL-CCNC: 34 U/L (ref 15–37)
BILIRUB DIRECT SERPL-MCNC: <0.1 MG/DL
BILIRUB SERPL-MCNC: 0.3 MG/DL (ref 0.2–1.1)
BUN SERPL-MCNC: 17 MG/DL (ref 8–23)
CALCIUM SERPL-MCNC: 7.9 MG/DL (ref 8.3–10.4)
CHLORIDE SERPL-SCNC: 111 MMOL/L (ref 98–107)
CO2 SERPL-SCNC: 27 MMOL/L (ref 21–32)
CREAT SERPL-MCNC: 1.09 MG/DL (ref 0.6–1)
ERYTHROCYTE [DISTWIDTH] IN BLOOD BY AUTOMATED COUNT: 15.8 % (ref 11.9–14.6)
GLOBULIN SER CALC-MCNC: 3.2 G/DL (ref 2.3–3.5)
GLUCOSE BLD STRIP.AUTO-MCNC: 135 MG/DL (ref 65–100)
GLUCOSE BLD STRIP.AUTO-MCNC: 181 MG/DL (ref 65–100)
GLUCOSE SERPL-MCNC: 153 MG/DL (ref 65–100)
HCT VFR BLD AUTO: 26.9 % (ref 35.8–46.3)
HGB BLD-MCNC: 8.4 G/DL (ref 11.7–15.4)
MCH RBC QN AUTO: 29.8 PG (ref 26.1–32.9)
MCHC RBC AUTO-ENTMCNC: 31.2 G/DL (ref 31.4–35)
MCV RBC AUTO: 95.4 FL (ref 79.6–97.8)
NRBC # BLD: 0 K/UL (ref 0–0.2)
PLATELET # BLD AUTO: 190 K/UL (ref 150–450)
PMV BLD AUTO: 11.6 FL (ref 9.4–12.3)
POTASSIUM SERPL-SCNC: 4.1 MMOL/L (ref 3.5–5.1)
PROT SERPL-MCNC: 5.3 G/DL (ref 6.3–8.2)
RBC # BLD AUTO: 2.82 M/UL (ref 4.05–5.2)
SODIUM SERPL-SCNC: 144 MMOL/L (ref 136–145)
VANCOMYCIN TROUGH SERPL-MCNC: 14.7 UG/ML (ref 5–20)
WBC # BLD AUTO: 8 K/UL (ref 4.3–11.1)

## 2019-08-16 PROCEDURE — 80076 HEPATIC FUNCTION PANEL: CPT

## 2019-08-16 PROCEDURE — 74011250636 HC RX REV CODE- 250/636: Performed by: INTERNAL MEDICINE

## 2019-08-16 PROCEDURE — 74011000258 HC RX REV CODE- 258: Performed by: HOSPITALIST

## 2019-08-16 PROCEDURE — 74011636637 HC RX REV CODE- 636/637: Performed by: INTERNAL MEDICINE

## 2019-08-16 PROCEDURE — 80048 BASIC METABOLIC PNL TOTAL CA: CPT

## 2019-08-16 PROCEDURE — 97110 THERAPEUTIC EXERCISES: CPT

## 2019-08-16 PROCEDURE — 82962 GLUCOSE BLOOD TEST: CPT

## 2019-08-16 PROCEDURE — 74011250637 HC RX REV CODE- 250/637: Performed by: INTERNAL MEDICINE

## 2019-08-16 PROCEDURE — 74011000258 HC RX REV CODE- 258: Performed by: INTERNAL MEDICINE

## 2019-08-16 PROCEDURE — 80202 ASSAY OF VANCOMYCIN: CPT

## 2019-08-16 PROCEDURE — 36592 COLLECT BLOOD FROM PICC: CPT

## 2019-08-16 PROCEDURE — 74011250637 HC RX REV CODE- 250/637: Performed by: HOSPITALIST

## 2019-08-16 PROCEDURE — 97530 THERAPEUTIC ACTIVITIES: CPT

## 2019-08-16 PROCEDURE — 85027 COMPLETE CBC AUTOMATED: CPT

## 2019-08-16 PROCEDURE — 74011250636 HC RX REV CODE- 250/636: Performed by: HOSPITALIST

## 2019-08-16 RX ORDER — PANTOPRAZOLE SODIUM 40 MG/1
40 TABLET, DELAYED RELEASE ORAL DAILY
Qty: 30 TAB | Refills: 2 | Status: ON HOLD
Start: 2019-08-16 | End: 2019-09-19

## 2019-08-16 RX ORDER — METOPROLOL TARTRATE 25 MG/1
12.5 TABLET, FILM COATED ORAL EVERY 12 HOURS
Qty: 30 TAB | Refills: 2 | Status: ON HOLD
Start: 2019-08-16 | End: 2019-09-19

## 2019-08-16 RX ADMIN — VANCOMYCIN HYDROCHLORIDE 1250 MG: 10 INJECTION, POWDER, LYOPHILIZED, FOR SOLUTION INTRAVENOUS at 10:49

## 2019-08-16 RX ADMIN — MEROPENEM 500 MG: 500 INJECTION, POWDER, FOR SOLUTION INTRAVENOUS at 10:12

## 2019-08-16 RX ADMIN — Medication 10 ML: at 05:01

## 2019-08-16 RX ADMIN — Medication 10 ML: at 08:59

## 2019-08-16 RX ADMIN — LOSARTAN POTASSIUM 100 MG: 50 TABLET ORAL at 08:45

## 2019-08-16 RX ADMIN — SODIUM CHLORIDE, PRESERVATIVE FREE 300 UNITS: 5 INJECTION INTRAVENOUS at 05:00

## 2019-08-16 RX ADMIN — INSULIN LISPRO 3 UNITS: 100 INJECTION, SOLUTION INTRAVENOUS; SUBCUTANEOUS at 13:05

## 2019-08-16 RX ADMIN — PANTOPRAZOLE SODIUM 40 MG: 40 TABLET, DELAYED RELEASE ORAL at 06:19

## 2019-08-16 RX ADMIN — MEROPENEM 500 MG: 500 INJECTION, POWDER, FOR SOLUTION INTRAVENOUS at 01:26

## 2019-08-16 RX ADMIN — METOPROLOL TARTRATE 12.5 MG: 25 TABLET ORAL at 08:44

## 2019-08-16 NOTE — PROGRESS NOTES
Patient rested well through night, awake now, alert and oriented. Resting quietly, head of bed elevated for comfort, watching TV. Call light in reach.   Will given report to oncoming RN

## 2019-08-16 NOTE — PROGRESS NOTES
Problem: Mobility Impaired (Adult and Pediatric)  Goal: *Acute Goals and Plan of Care (Insert Text)  Description  LTG:  (1.)Ms. Walterine Sever will move from supine to sit and sit to supine , scoot up and down and roll side to side in bed with MODIFIED INDEPENDENCE within 7 treatment day(s). (2.)Ms. Walterine Sever will transfer from bed to chair and chair to bed with SUPERVISION using the least restrictive device within 7 treatment day(s). (3.)Ms. Walterine Sever will ambulate with SUPERVISION for 200 feet with the least restrictive device within 7 treatment day(s). (4.)Ms. Walterine Sever will participate in therapeutic activity/exercises x 23 minutes for increased strength within 7 treatment days. (5.)Ms. Walterine Sever will perform standing static and dynamic balance activities x 12 minutes with STAND BY ASSIST to improve safety within 7 treatment day(s). ________________________________________________________________________________________________     Outcome: Progressing Towards Goal     PHYSICAL THERAPY: Daily Note and AM 8/16/2019  INPATIENT: PT Visit Days : 3  Payor: Nery Mckeon / Plan: [address] / Product Type: [employer]. Care Medicare /       NAME/AGE/GENDER: Ira Webb is a 78 y.o. female   PRIMARY DIAGNOSIS: Septic shock ([address] [A41.9, R65.21]  Septic shock ([address] [A41.9, R65.21] Septic shock ([address]   Septic shock ([address]    Procedure(s) (LRB):  ENDOSCOPIC RETROGRADE CHOLANGIOPANCREATOGRAPHY (ERCP) (N/A)  ENDOSCOPIC STONE EXTRACTION/BALLOON SWEEP (N/A)  BILIARY STENT PLACEMENT (N/A)  6 Days Post-Op  ICD-10: Treatment Diagnosis:    · Generalized Muscle Weakness (M62.81)  · Difficulty in walking, Not elsewhere classified (R26.2)  · History of falling (Z91.81)   Precaution/Allergies:  Sulfa (sulfonamide antibiotics)      ASSESSMENT:     Ms. Walterine Sever  Is sitting up in bedside chair upon contact and agreeable to PT treatment.   She stood with CGA, donned a robe and increased her gait distance to about 150 ft with CGA. Upon return she performed seated exercises below. Tolerated very well and should do very well at rehab. This section established at most recent assessment   PROBLEM LIST (Impairments causing functional limitations):  1. Decreased Strength  2. Decreased ADL/Functional Activities  3. Decreased Transfer Abilities  4. Decreased Ambulation Ability/Technique  5. Decreased Balance  6. Decreased Activity Tolerance   INTERVENTIONS PLANNED: (Benefits and precautions of physical therapy have been discussed with the patient.)  1. Balance Exercise  2. Bed Mobility  3. Family Education  4. Gait Training  5. Therapeutic Activites  6. Therapeutic Exercise/Strengthening  7. Transfer Training     TREATMENT PLAN: Frequency/Duration: 3 times a week for duration of hospital stay  Rehabilitation Potential For Stated Goals: Good     REHAB RECOMMENDATIONS (at time of discharge pending progress):    Placement: It is my opinion, based on this patient's performance to date, that Ms. Elvia Quintero may benefit from intensive therapy at a [address] [place] [address] after discharge due to the functional deficits listed above that are likely to improve with skilled rehabilitation and concerns that he/she may be unsafe to be unsupervised at home due to decreased strength and balance putting pt at increased risk for falls. Equipment:    None at this time              HISTORY:   History of Present Injury/Illness (Reason for Referral):  sepsis  Past Medical History/Comorbidities:   Ms. Elvia Quintero  has a past medical history of Acute cystitis without hematuria (1/30/2019), CAD (coronary artery disease), DM2 (diabetes mellitus, type 2) ([facility] Gout, HLD (hyperlipidemia), HTN (hypertension), and Peripheral neuropathy. Ms. Elvia Quintero  has a past surgical history that includes hx tubal ligation; hx coronary artery bypass graft; pr cardiac surg procedure unlist; and hx cholecystectomy.   Social History/Living Environment:   Home Environment: Apartment  # Steps to Enter: 0  One/Two Story Residence: One story  Living Alone: Yes  Support Systems: Child(marga), Home care staff  Patient Expects to be Discharged to[de-identified] Unknown  Current DME Used/Available at Home: Shower chair, Walker, rollator, Cane, straight  Tub or Shower Type: Tub/Shower combination  Prior Level of Function/Work/Activity:  Lives in senior apartment alone and PRN use of AD for [facility] ambulation. One recent fall reported. Number of Personal Factors/Comorbidities that affect the Plan of Care: 1-2: MODERATE COMPLEXITY   EXAMINATION:   Most Recent Physical Functioning:   Gross Assessment:                  Posture:     Balance:    Bed Mobility:     Wheelchair Mobility:     Transfers:  Sit to Stand: Stand-by assistance;Contact guard assistance  Stand to Sit: Minimum assistance  Gait:     Speed/Noemi: Pace decreased (<100 feet/min); Shuffled  Gait Abnormalities: Decreased step clearance;Shuffling gait; Steppage gait  Distance (ft): 150 Feet (ft)  Assistive Device: Walker, rolling  Ambulation - Level of Assistance: Contact guard assistance      Body Structures Involved:  1. Metabolic  2. Endocrine  3. Muscles Body Functions Affected:  1. Neuromusculoskeletal  2. Movement Related  3. Metobolic/Endocrine Activities and Participation Affected:  1. General Tasks and Demands  2. Mobility  3. Self Care  4. Domestic Life  5. Community, Social and [place]   Number of elements that affect the Plan of Care: 4+: HIGH COMPLEXITY   CLINICAL PRESENTATION:   Presentation: Stable and uncomplicated: LOW COMPLEXITY   CLINICAL DECISION MAKIN [facility] Short Form  How much difficulty does the patient currently have. .. Unable A Lot A Little None   1. Turning over in bed (including adjusting bedclothes, sheets and blankets)? ? 1   ? 2   ? 3   ? 4   2. Sitting down on and standing up from a chair with arms ( e.g., wheelchair, bedside commode, etc.)   ?  1   ? 2 ? 3   ? 4   3. Moving from lying on back to sitting on the side of the bed?   ? 1   ? 2   ? 3   ? 4   How much help from another person does the patient currently need. .. Total A Lot A Little None   4. Moving to and from a bed to a chair (including a wheelchair)? ? 1   ? 2   ? 3   ? 4   5. Need to walk in hospital room? ? 1   ? 2   ? 3   ? 4   6. Climbing 3-5 steps with a railing? ? 1   ? 2   ? 3   ? 4   © 2007, Trustees of [address] [place] [address] [address] [id number], under license to [employer]. All rights reserved      Score:  Initial: 19 Most Recent: X (Date: -- )    Interpretation of Tool:  Represents activities that are increasingly more difficult (i.e. Bed mobility, Transfers, Gait). Medical Necessity:     · Patient demonstrates good  ·  rehab potential due to higher previous functional level. Reason for Services/Other Comments:  · Patient continues to require skilled intervention due to functional mobility and balance. · .   Use of outcome tool(s) and clinical judgement create a POC that gives a: Clear prediction of patient's progress: LOW COMPLEXITY            TREATMENT:   (In addition to Assessment/Re-Assessment sessions the following treatments were rendered)   Pre-treatment Symptoms/Complaints:  No complaints  Pain: Initial:   Pain Intensity 1: 0  Post Session:  0     Therapeutic Activity: (    14 minutes): Therapeutic activities including Chair transfers and Ambulation on level ground to improve mobility, strength, balance and coordination. Required minimal   to promote static and dynamic balance in standing and proper use of RW for transfer/ambulation . Therapeutic Exercise: (10 Minutes ):  Exercises per grid below to improve mobility and strength. Required minimal visual and verbal cues to promote proper body alignment, promote proper body posture and promote proper body mechanics. Progressed repetitions as indicated.         Date:  8/12/19 Date:  8/14/19 Date:  8/15/19   Activity/Exercise Parameters Parameters Parameters   Marching 1 x 15 B 10 X B  20x B   LAQ 1 x 10 B 10 X B 20x B   APs 1 x 20 B 10 X B  20x B   Heel slides  10 X B     Hip abduction  10 X B  20x B                   Braces/Orthotics/Lines/Etc:   · none  Treatment/Session Assessment:    · Response to Treatment:  above  · Interdisciplinary Collaboration:   o Physical Therapy Assistant  o Registered Nurse  · After treatment position/precautions:   o Up in chair  o Bed/Chair-wheels locked  o Bed in low position  o Call light within reach   · Compliance with Program/Exercises: Compliant all of the time  · Recommendations/Intent for next treatment session: \"Next visit will focus on advancements to more challenging activities and reduction in assistance provided\".   Total Treatment Duration:  PT Patient Time In/Time Out  Time In: 0915  Time Out: 0940    Carolin Chery, PTA

## 2019-08-16 NOTE — PROGRESS NOTES
Patient is discharging to [facility] this afternoon. She will transport via [facility] at 13:00 this afternoon per the facility's request. Spoke with patient's daughter, Valentin Cho. Both patient and her daughter voice understanding and agreement with the discharge plan. Case Management will remain available to assist as needed.     Care Management Interventions  PCP Verified by CM: Jenny Gonzalez)  Transition of Care Consult (CM Consult): Discharge Planning  Discharge Durable Medical Equipment: No  Physical Therapy Consult: Yes  Occupational Therapy Consult: Yes  Speech Therapy Consult: No  Current Support Network: Lives Alone, Own Home  Confirm Follow Up Transport: Family  Plan discussed with Pt/Family/Caregiver: Yes  Freedom of Choice Offered: Yes  Discharge Location  Discharge Placement: Rehab Unit Subacute

## 2019-08-16 NOTE — PROGRESS NOTES
Problem: Risk for Spread of Infection  Goal: Prevent transmission of infectious organism to others  Description  Prevent the transmission of infectious organisms to other patients, staff members, and visitors. Outcome: Progressing Towards Goal     Problem: Patient Education:  Go to Education Activity  Goal: Patient/Family Education  Outcome: Progressing Towards Goal     Problem: Falls - Risk of  Goal: *Absence of Falls  Description  Document Myrna Uriostegui Fall Risk and appropriate interventions in the flowsheet. Outcome: Progressing Towards Goal  Note:   Fall Risk Interventions:  Mobility Interventions: Patient to call before getting OOB         Medication Interventions: Patient to call before getting OOB    Elimination Interventions: Call light in reach    History of Falls Interventions: Door open when patient unattended         Problem: Patient Education: Go to Patient Education Activity  Goal: Patient/Family Education  Outcome: Progressing Towards Goal     Problem: Pressure Injury - Risk of  Goal: *Prevention of pressure injury  Description  Document Ashu Scale and appropriate interventions in the flowsheet.   Outcome: Progressing Towards Goal  Note:   Pressure Injury Interventions:  Sensory Interventions: Pressure redistribution bed/mattress (bed type)    Moisture Interventions: Minimize layers, Limit adult briefs    Activity Interventions: Pressure redistribution bed/mattress(bed type)    Mobility Interventions: Pressure redistribution bed/mattress (bed type)    Nutrition Interventions: Offer support with meals,snacks and hydration, Document food/fluid/supplement intake    Friction and Shear Interventions: Apply protective barrier, creams and emollients, Minimize layers                Problem: Patient Education: Go to Patient Education Activity  Goal: Patient/Family Education  Outcome: Progressing Towards Goal     Problem: Diabetes Self-Management  Goal: *Disease process and treatment process  Description  Define diabetes and identify own type of diabetes; list 3 options for treating diabetes. Outcome: Progressing Towards Goal  Goal: *Incorporating nutritional management into lifestyle  Description  Describe effect of type, amount and timing of food on blood glucose; list 3 methods for planning meals. Outcome: Progressing Towards Goal  Goal: *Incorporating physical activity into lifestyle  Description  State effect of exercise on blood glucose levels. Outcome: Progressing Towards Goal  Goal: *Developing strategies to promote health/change behavior  Description  Define the ABC's of diabetes; identify appropriate screenings, schedule and personal plan for screenings. Outcome: Progressing Towards Goal  Goal: *Using medications safely  Description  State effect of diabetes medications on diabetes; name diabetes medication taking, action and side effects. Outcome: Progressing Towards Goal  Goal: *Monitoring blood glucose, interpreting and using results  Description  Identify recommended blood glucose targets  and personal targets. Outcome: Progressing Towards Goal  Goal: *Prevention, detection, treatment of acute complications  Description  List symptoms of hyper- and hypoglycemia; describe how to treat low blood sugar and actions for lowering  high blood glucose level. Outcome: Progressing Towards Goal  Goal: *Prevention, detection and treatment of chronic complications  Description  Define the natural course of diabetes and describe the relationship of blood glucose levels to long term complications of diabetes.   Outcome: Progressing Towards Goal  Goal: *Developing strategies to address psychosocial issues  Description  Describe feelings about living with diabetes; identify support needed and support network  Outcome: Progressing Towards Goal  Goal: *Insulin pump training  Outcome: Progressing Towards Goal  Goal: *Sick day guidelines  Outcome: Progressing Towards Goal  Goal: *Patient Specific Goal (EDIT GOAL, INSERT TEXT)  Outcome: Progressing Towards Goal     Problem: Patient Education: Go to Patient Education Activity  Goal: Patient/Family Education  Outcome: Progressing Towards Goal     Problem: Patient Education: Go to Patient Education Activity  Goal: Patient/Family Education  Outcome: Progressing Towards Goal     Problem: Patient Education: Go to Patient Education Activity  Goal: Patient/Family Education  Outcome: Progressing Towards Goal     Problem: Nutrition Deficit  Goal: *Optimize nutritional status  Outcome: Progressing Towards Goal

## 2019-08-16 NOTE — PROGRESS NOTES
Assumed care of patient. Assessment completed and documented, see docflow. Patient awake, resting quietly in bed. A/Ox3. Denies pain or needs. NAD noted at present. Respirations even and non labored on RA. Verbalized understanding to call for needs. Call light within reach. Will continue to [facility].

## 2019-08-16 NOTE — PROGRESS NOTES
Patient assisted to bathroom, ambulation unsteady, requires 1 assist.  Resting in bed, no c/o discomfort, watching TV. Took late snack. Call light in reach, will monitor.

## 2019-08-16 NOTE — PROGRESS NOTES
[facility] Progress Note         Admit Date:  8/8/2019    Today's Date:  8/16/2019    CC:  Elevated liver chemistry    Subjective:     Patient sitting in bedside chair. Appetite fair, no abdominal pain. Denies fever, chills. Medications:   Current Facility-Administered Medications   Medication Dose Route Frequency    sodium chloride (NS) flush 10 mL  10 mL InterCATHeter Q8H    heparin (porcine) pf 300 Units  300 Units InterCATHeter Q8H    sodium chloride (NS) flush 10 mL  10 mL InterCATHeter PRN    heparin (porcine) pf 300 Units  300 Units InterCATHeter PRN    metoprolol tartrate (LOPRESSOR) tablet 12.5 mg  12.5 mg Oral Q12H    vancomycin (VANCOCIN) 1,250 mg in dextrose 5% 250 mL IVPB  1,250 mg IntraVENous Q24H    pantoprazole (PROTONIX) tablet 40 mg  40 mg Oral ACB&D    losartan (COZAAR) tablet 100 mg  100 mg Oral DAILY    meropenem (MERREM) 500 mg in 0.9% sodium chloride (MBP/ADV) 50 mL  500 mg IntraVENous Q8H    lip protectant (BLISTEX) ointment 1 Each  1 Each Topical PRN    0.9% sodium chloride infusion 250 mL  250 mL IntraVENous PRN    insulin lispro (HUMALOG) injection   SubCUTAneous AC&HS    insulin glargine (LANTUS) injection 50 Units  50 Units SubCUTAneous QHS    sodium chloride (NS) flush 5-40 mL  5-40 mL IntraVENous Q8H    sodium chloride (NS) flush 5-40 mL  5-40 mL IntraVENous PRN    ondansetron (ZOFRAN) injection 4 mg  4 mg IntraVENous Q4H PRN    dextrose 40% (GLUTOSE) oral gel 1 Tube  15 g Oral PRN    glucagon (GLUCAGEN) injection 1 mg  1 mg IntraMUSCular PRN    dextrose (D50W) injection syrg 12.5-25 g  25-50 mL IntraVENous PRN       Review of Systems:  ROS was obtained, with pertinent positives as listed above. No chest pain or SOB.     Diet:  Diabetic regular    Objective:   Vitals:  Visit Vitals  /75   Pulse 60   Temp 98.1 °F (36.7 °C)   Resp 16   Ht 5' 3\" (1.6 m)   Wt 89.4 kg (197 lb 3.2 oz)   SpO2 96%   BMI 34.93 kg/m²     Intake/Output:  No intake/output data recorded. 08/14 1901 - 08/16 0700  In: 1385 [P.O.:1235]  Out: 2250 [Urine:2250]  Exam:  General appearance: alert, cooperative, no distress  Lungs: clear to auscultation bilaterally anteriorly  Heart: regular rate and rhythm  Abdomen: soft, non-tender. Bowel sounds normal. No masses, no organomegaly  Extremities: extremities normal, atraumatic, no cyanosis or edema  Neuro:  alert and oriented    Data Review (Labs):    Recent Labs     08/16/19  0325 08/15/19  0601 08/14/19  0936   WBC 8.0 9.0 10.6   HGB 8.4* 8.6* 9.5*   HCT 26.9* 27.1* 29.6*    173 156   MCV 95.4 95.4 93.4    144 142   K 4.1 4.1 4.3   * 112* 110*   CO2 27 28 28   BUN 17 19 20   CREA 1.09* 1.04* 1.20*   CA 7.9* 8.1* 8.1*   * 127* 119*   *  --  366*   SGOT 34  --  29   *  --  245*   TBILI 0.3  --  0.4   CBIL <0.1  --  0.2   ALB 2.1*  --  2.2*   TP 5.3*  --  5.4*       Endoscopic Retrograde Cholangiopancreatogram   DATE of PROCEDURE: 8/10/2019   POSTPROCEDURE DIAGNOSIS:  1. Possible cholangitis   MEDICATIONS ADMINISTERED:  GETA   INSTRUMENT: [id number]   PROCEDURE:  After obtaining informed consent, the patient was placed in prone position on the fluoroscopy table. The patient was then sedated. The endoscope was passed under indirect technique to the oropharynx and gently passed into the esophagus. The endoscope was passed to the ampulla. Limited views of the stomach and duodenum were obtained. After the procedure, the patient was taken to the recovery area in stable condition.   Ampulla: The ampulla was located in the expected position and was edematous and bulging into the second portion of the duodenum. There was evidence of prior sphincterotomy with no active bleeding.    Cannulation: Cannulation performed on first attempt with the short nose traction sphincterotome preloaded with a 0.035 inch guidewire which was used for deep cannulation of the biliary tree.  Contrast was injected resulting in complete opacification.    Cholangiogram:  Initial cholangiogram demonstrated a dilated CBD with apparent stenosis in the distal CBD, likely from the edematous ampulla. After cannulation, when the tome was lifted, thick black bile with small wisps of white pus were seen.    Interventions:   The CBD was swept multiple times with the 12 mm biliary extraction balloon, producing a thick black bile/sludge but no stones. There was no evidence of contrast extravasation. The intrahepatic biliary tree appeared normal. Final sweeps were clear and produced clear green bile.    The bile duct was observed under fluoroscopy, and contrast did not appear to drain. There appeared to be a smooth cutoff in the distal CBD as seen on image 5, likely from the edematous ampulla.    Next, one 10 Fr x 9 cm plastic biliary stent (7 cm not available) was placed in the common bile duct. Green bile was seen flowing through the stent. The stent was placed in good position.     Estimated Blood Loss: 0 cc-min   ASSESSMENT:  1. Possible Cholangitis   2. Ampullary stenosis and edema   3. Placement of 10 Fr biliary stent    PLAN:  1. Follow up with inpatient team   2. Follow LFTs  3. Continue antibiotic therapy, follow microbiology culture data   4. If she declines clinically, consider urinary source as she has history of ESBL E.coli in the urine   5.  Repeat ERCP in 4-6 weeks for stent management              Assessment:     Active Problems:    Uncontrolled type 2 diabetes mellitus with hyperglycemia ([address] (6/15/2015)      Chronic kidney disease, stage III (moderate) (HCC) (6/15/2015)      Acute renal failure (ARF) ([address] (8/8/2019)      Elevated liver function tests (8/8/2019)      Anemia (8/8/2019)      Right lower quadrant abdominal pain (8/8/2019)      Gram-negative bacteremia (8/9/2019)      Acute pancreatitis (8/12/2019)    77 yo female presented in septic shock 2 weeks after ERCP, with labs suggesting ischemic hepatitis, pancreatitis with PRATIMA. Non contrast CT unrevealing, ultrasound with dopplers ruled out mesenteric vascular clot. She has bacteremia and ESBL-producing E. Coli in urine culture from 8/8 but UA generally bland on admission and no dysuria.  ERCP 8/10 to evaluate and treat for possible cholangitis with stent placed. Abx broadened to cover for ESBL. Afebrile. Urine culture on admission with ecoli. ID consult reviewed. Leukocytosis resolved. Blood culture 8/12 x 2 with no growth at this point, following initial cultures with enterococcus faecalis and e coli on original cultures 8/8/19. Plan:     1. Remains on Vancomycin, Merrem; to transition Merrem to Ertapenem at discharge x 3 weeks, then given Fosfomycin 3 g po q 3d until ERCP with stent removal  2. Leukocytosis resolved; hgb stable  3. Repeat liver chemistry with improving ALT but some continued elevation in alk phos  4. Discharge to SNF in next few days  5. Repeat ERCP in 4-6 weeks for stent removal unless needs to be removed earlier    Patient is seen and examined in collaboration with Dr. Estee Garcia. Assessment and plan as per Dr. Estee Garcia.   Carlin Pizarro NP

## 2019-08-16 NOTE — PROGRESS NOTES
Problem: Risk for Spread of Infection  Goal: Prevent transmission of infectious organism to others  Description  Prevent the transmission of infectious organisms to other patients, staff members, and visitors. 8/16/2019 1228 by Gopi Mccormack RN  Outcome: Resolved/Met  8/16/2019 0801 by Gopi Mccormack RN  Outcome: Progressing Towards Goal     Problem: Patient Education:  Go to Education Activity  Goal: Patient/Family Education  8/16/2019 1228 by Gopi Mccormack RN  Outcome: Resolved/Met  8/16/2019 0801 by Gopi Mccormack RN  Outcome: Progressing Towards Goal     Problem: Falls - Risk of  Goal: *Absence of Falls  Description  Document Myrna Uriostegui Fall Risk and appropriate interventions in the flowsheet. 8/16/2019 1228 by Gopi Mccormack RN  Outcome: Resolved/Met  Note:   Fall Risk Interventions:  Mobility Interventions: Patient to call before getting OOB         Medication Interventions: Evaluate medications/consider consulting pharmacy, Patient to call before getting OOB    Elimination Interventions: Call light in reach, Patient to call for help with toileting needs    History of Falls Interventions: Door open when patient unattended      8/16/2019 0801 by Gopi Mccormack RN  Outcome: Progressing Towards Goal  Note:   Fall Risk Interventions:  Mobility Interventions: Patient to call before getting OOB         Medication Interventions: Patient to call before getting OOB    Elimination Interventions: Call light in reach    History of Falls Interventions: Door open when patient unattended         Problem: Patient Education: Go to Patient Education Activity  Goal: Patient/Family Education  8/16/2019 1228 by Gopi Mccormack RN  Outcome: Resolved/Met  8/16/2019 0801 by Gopi Mccormack RN  Outcome: Progressing Towards Goal     Problem: Pressure Injury - Risk of  Goal: *Prevention of pressure injury  Description  Document Ashu Scale and appropriate interventions in the flowsheet.   8/16/2019 1228 by Zohra Ko RN  Outcome: Resolved/Met  Note:   Pressure Injury Interventions:  Sensory Interventions: Pressure redistribution bed/mattress (bed type)    Moisture Interventions: Minimize layers, Limit adult briefs    Activity Interventions: Pressure redistribution bed/mattress(bed type)    Mobility Interventions: Pressure redistribution bed/mattress (bed type)    Nutrition Interventions: Offer support with meals,snacks and hydration, Document food/fluid/supplement intake    Friction and Shear Interventions: Apply protective barrier, creams and emollients, Minimize layers             8/16/2019 0801 by Zohra Ko RN  Outcome: Progressing Towards Goal  Note:   Pressure Injury Interventions:  Sensory Interventions: Pressure redistribution bed/mattress (bed type)    Moisture Interventions: Minimize layers, Limit adult briefs    Activity Interventions: Pressure redistribution bed/mattress(bed type)    Mobility Interventions: Pressure redistribution bed/mattress (bed type)    Nutrition Interventions: Offer support with meals,snacks and hydration, Document food/fluid/supplement intake    Friction and Shear Interventions: Apply protective barrier, creams and emollients, Minimize layers                Problem: Patient Education: Go to Patient Education Activity  Goal: Patient/Family Education  8/16/2019 1228 by Zohra Ko RN  Outcome: Resolved/Met  8/16/2019 0801 by Zohra Ko RN  Outcome: Progressing Towards Goal     Problem: Diabetes Self-Management  Goal: *Disease process and treatment process  Description  Define diabetes and identify own type of diabetes; list 3 options for treating diabetes.   8/16/2019 1228 by Zohra Ko RN  Outcome: Resolved/Met  8/16/2019 0801 by Zohra Ko RN  Outcome: Progressing Towards Goal  Goal: *Incorporating nutritional management into lifestyle  Description  Describe effect of type, amount and timing of food on blood glucose; list 3 methods for planning meals. 8/16/2019 1228 by Perry Almazan RN  Outcome: Resolved/Met  8/16/2019 0801 by Perry Almazan RN  Outcome: Progressing Towards Goal  Goal: *Incorporating physical activity into lifestyle  Description  State effect of exercise on blood glucose levels. 8/16/2019 1228 by Perry Almazan RN  Outcome: Resolved/Met  8/16/2019 0801 by Perry Almazan RN  Outcome: Progressing Towards Goal  Goal: *Developing strategies to promote health/change behavior  Description  Define the ABC's of diabetes; identify appropriate screenings, schedule and personal plan for screenings. 8/16/2019 1228 by Perry Almazan RN  Outcome: Resolved/Met  8/16/2019 0801 by Perry Almazan RN  Outcome: Progressing Towards Goal  Goal: *Using medications safely  Description  State effect of diabetes medications on diabetes; name diabetes medication taking, action and side effects. 8/16/2019 1228 by Perry Almazan RN  Outcome: Resolved/Met  8/16/2019 0801 by Perry Almazan RN  Outcome: Progressing Towards Goal  Goal: *Monitoring blood glucose, interpreting and using results  Description  Identify recommended blood glucose targets  and personal targets. 8/16/2019 1228 by Perry Almazan RN  Outcome: Resolved/Met  8/16/2019 0801 by Perry Almazan RN  Outcome: Progressing Towards Goal  Goal: *Prevention, detection, treatment of acute complications  Description  List symptoms of hyper- and hypoglycemia; describe how to treat low blood sugar and actions for lowering  high blood glucose level. 8/16/2019 1228 by Perry Almazan RN  Outcome: Resolved/Met  8/16/2019 0801 by Perry Almazan RN  Outcome: Progressing Towards Goal  Goal: *Prevention, detection and treatment of chronic complications  Description  Define the natural course of diabetes and describe the relationship of blood glucose levels to long term complications of diabetes.   8/16/2019 1228 by Perry Almazan RN  Outcome: Resolved/Met  8/16/2019 0801 by Bard Aidan RN  Outcome: Progressing Towards Goal  Goal: *Developing strategies to address psychosocial issues  Description  Describe feelings about living with diabetes; identify support needed and support network  8/16/2019 1228 by Bard Aidan RN  Outcome: Resolved/Met  8/16/2019 0801 by Bard Aidan RN  Outcome: Progressing Towards Goal  Goal: *Insulin pump training  8/16/2019 1228 by Bard Aidan RN  Outcome: Resolved/Met  8/16/2019 0801 by Bard Aidan RN  Outcome: Progressing Towards Goal  Goal: *Sick day guidelines  8/16/2019 1228 by Bard Aidan RN  Outcome: Resolved/Met  8/16/2019 0801 by Bard Aidan RN  Outcome: Progressing Towards Goal  Goal: *Patient Specific Goal (EDIT GOAL, INSERT TEXT)  8/16/2019 1228 by Bard Aidan RN  Outcome: Resolved/Met  8/16/2019 0801 by Bard Aidan RN  Outcome: Progressing Towards Goal     Problem: Patient Education: Go to Patient Education Activity  Goal: Patient/Family Education  8/16/2019 1228 by Bard Aidan RN  Outcome: Resolved/Met  8/16/2019 0801 by Bard Aidan RN  Outcome: Progressing Towards Goal     Problem: Patient Education: Go to Patient Education Activity  Goal: Patient/Family Education  8/16/2019 1228 by Bard Aidan RN  Outcome: Resolved/Met  8/16/2019 0801 by Bard Aidan RN  Outcome: Progressing Towards Goal     Problem: Patient Education: Go to Patient Education Activity  Goal: Patient/Family Education  8/16/2019 1228 by Bard Aidan RN  Outcome: Resolved/Met  8/16/2019 0801 by Bard Aidan RN  Outcome: Progressing Towards Goal     Problem: Interdisciplinary Rounds  Goal: Interdisciplinary Rounds  Outcome: Resolved/Met     Problem: Nutrition Deficit  Goal: *Optimize nutritional status  8/16/2019 1228 by Bard Aidan RN  Outcome: Resolved/Met  8/16/2019 0801 by Bard Aidan RN  Outcome: Progressing Towards Goal

## 2019-08-16 NOTE — PROGRESS NOTES
Insurance authorization received for short-term rehab at [facility]. Patient can discharge today if medically ready. Case Management will continue to follow.     Care Management Interventions  PCP Verified by CM: Jenny Gonzalez)  Transition of Care Consult (CM Consult): Discharge Planning  Discharge Durable Medical Equipment: No  Physical Therapy Consult: Yes  Occupational Therapy Consult: Yes  Speech Therapy Consult: No  Current Support Network: Lives Alone, Own Home  Confirm Follow Up Transport: Family  Plan discussed with Pt/Family/Caregiver: Yes  Freedom of Choice Offered: Yes  Discharge Location  Discharge Placement: Rehab Unit Subacute

## 2019-08-16 NOTE — PROGRESS NOTES
Pharmacokinetic Consult to Pharmacist    Shadeviktor Chris is a 78 y.o. female being treated for BSI/UTI with vancomycin and meropenem. Treating enterococcus and ESBL E coli. Height: 5' 3\" (160 cm)  Weight: 89.4 kg (197 lb 3.2 oz)  Lab Results   Component Value Date/Time    BUN 17 08/16/2019 03:25 AM    Creatinine 1.09 (H) 08/16/2019 03:25 AM    WBC 8.0 08/16/2019 03:25 AM    Procalcitonin 9.5 08/08/2019 12:28 PM    Lactic acid 2.0 08/09/2019 12:12 AM    Lactic Acid (POC) 4.51 (H) 08/08/2019 02:44 PM      Estimated Creatinine Clearance: 42.9 mL/min (A) (based on SCr of 1.09 mg/dL (H)). Lab Results   Component Value Date/Time    Vancomycin,trough 14.7 08/16/2019 09:04 AM    Vancomycin, random 11.1 08/10/2019 03:41 AM       Day 8 of vancomycin. Goal trough is 15-20. ID planning to treat with vancomycin + meropenem until 9/6/19, then transition to PO Fosfomycin. Vancomycin trough resulted at 14.7. Will give next dose about 1 hour earlier than normal to get trough within therapeutic range. Plan to continue vancomycin 1250 mg Q24H for now. Pharmacy will continue to follow patient.     Thank you,  Kailey Norris, PharmD  Clinical Pharmacist  [phone]

## 2019-08-16 NOTE — DISCHARGE SUMMARY
Hospitalist Discharge Summary     Patient ID:  Jesus Hurst  [id number]  10 y.o.  1940  Admit date: 8/8/2019 11:57 AM  Discharge date and time: 8/16/2019  Attending: Alberta Espinoza MD  PCP:  Robert Garcia MD  Treatment Team: Attending Provider: Alberta Espinoza MD; Consulting Provider: Maria Del Rosario Bradford MD; Utilization Review: Gustavo Reich RN; Care Manager: Jocelyne Goldman RN; Consulting Provider: Call, Ramiro Romberg, MD; Primary Nurse: Penelope Garcia RN    Principal Diagnosis   Septic shock   ESBL E.coli and Enterococcus bacteremia  Shock liver  UTI  Transminitis  Acute renal failure  Post ERCP pancreatitis  Acute cholangitis  Lactic acidosis        Active Problems:    Uncontrolled type 2 diabetes mellitus with hyperglycemia ([facility] [address] (6/15/2015)      Chronic kidney disease, stage III (moderate) ([facility] [address] (6/15/2015)      Acute renal failure (ARF) ([facility] [address] (8/8/2019)      Elevated liver function tests (8/8/2019)      Anemia (8/8/2019)      Right lower quadrant abdominal pain (8/8/2019)      Gram-negative bacteremia (8/9/2019)      Acute pancreatitis (8/12/2019)             Hospital Course:  Please refer to the admission H&P for details of presentation. In summary, the patient is 78years old female with hx of CABG, HTN, HLD, CKD-3, s/p cholecystectomy, GERD admitted on 8/8/19 for sepsis, acute pancreatitis, ischemic/shock liver, lactic acidosis, PRATIMA, transaminits. Pt had a recent ERCP on 7/24 for dilated CBD with only small stone. Pt also has ESBL E.coli and Enterococcus in urine culture and GNR on blood culture. Pt likely developed post ERCP pancreatitis and possible cholangitis. Pt had repeat ERCP on 8/10 with placement of CBD stent. Pt was transferred from ICU to medical floor as she was clinically improving and hemodynamically stable. Pt had TTE, infective endocarditis was ruled out. Repeat blood cultures from 8/12/19 were negative for 72 hours, had PICC line paced on 8/14/19.  ID recommended to continue IV Vancomycin 1250 mg Q24hrs (aim trough~15), and Merrem 500 mg IV Q8hrs for 3 weeks with EOT 9/6/19 with transition to Fosfomycin 3gm PO Q3days until ERCP and stent removal.  Pt's renal function and liver enzymes has normalized. Pt is is medically cleared and clinically stable for discharge. Rest of the hospital course was uneventful. For further details, please refer to daily progress notes. Significant Diagnostic Studies:   INDICATION: Sepsis.     TECHNIQUE: Grayscale, color, and Doppler interrogation performed.     COMPARISON: None.     Left portal vein:  Patent cm/sec. Main portal Vein:  Patent cm/sec. Right portal Vein:  Patent cm/sec.     IMPRESSION  IMPRESSION:  Portal vein is patent. EXAM: XR CHEST SNGL V     INDICATION: septic shock     COMPARISON: 8/8/2019     FINDINGS: A portable AP radiograph of the chest was obtained at 0440 hours. The  patient is on a cardiac monitor. If use increased interstitial markings  improved. Left lower lobe airspace disease improved. . The cardiac and  mediastinal contours and pulmonary vascularity are normal.  The bones and soft  tissues are grossly within normal limits.      IMPRESSION  IMPRESSION: Interstitial pulmonary edema and left lower lobe atelectasis or  pneumonia improved. CTAP:  IMPRESSION:    1. The only potential etiology of sepsis is that there is some wall thickening  suggested of more proximal small bowel loops in the left abdomen with mild  adjacent mesenteric stranding which can suggest inflammation (i.e. enteritis). No free air or abnormal fluid collection is definitely seen on this limited  noncontrast study.      2. Nonspecific basilar airspace changes of the lungs likely representing  atelectasis given the distribution although pneumonia is not excluded if the  patient has symptoms of pneumonia.     3. Gas within the urinary bladder although this very well may have been  introduced with [place] catheter placement. This would be best further assessed  with urinalysis if this has not been recently performed. Labs: Results:       Chemistry Recent Labs     08/16/19  0325 08/15/19  0601 08/14/19 0936   * 127* 119*    144 142   K 4.1 4.1 4.3   * 112* 110*   CO2 27 28 28   BUN 17 19 20   CREA 1.09* 1.04* 1.20*   CA 7.9* 8.1* 8.1*   AGAP 6* 4* 4*   *  --  366*   TP 5.3*  --  5.4*   ALB 2.1*  --  2.2*   GLOB 3.2  --  3.2   AGRAT 0.7*  --  0.7*      CBC w/Diff Recent Labs     08/16/19  0325 08/15/19  0601 08/14/19  0936   WBC 8.0 9.0 10.6   RBC 2.82* 2.84* 3.17*   HGB 8.4* 8.6* 9.5*   HCT 26.9* 27.1* 29.6*    173 156      Cardiac Enzymes No results for input(s): CPK, CKND1, MARIO ALBERTO in the last 72 hours. No lab exists for component: CKRMB, TROIP   Coagulation No results for input(s): PTP, INR, APTT in the last 72 hours. No lab exists for component: INREXT    Lipid Panel Lab Results   Component Value Date/Time    Cholesterol, total 221 (H) 03/09/2015 02:42 PM    HDL Cholesterol 64 03/09/2015 02:42 PM    LDL, calculated 114 03/09/2015 02:42 PM    VLDL, calculated 43 (H) 03/09/2015 02:42 PM    Triglyceride 217 (H) 03/09/2015 02:42 PM    CHOL/HDL Ratio 3.5 03/09/2015 02:42 PM      BNP No results for input(s): BNPP in the last 72 hours. Liver Enzymes Recent Labs     08/16/19 0325   TP 5.3*   ALB 2.1*   *   SGOT 34      Thyroid Studies Lab Results   Component Value Date/Time    TSH 1.260 01/30/2019 10:32 AM            Discharge Exam:  Visit Vitals  /75   Pulse 60   Temp 98.1 °F (36.7 °C)   Resp 16   Ht 5' 3\" (1.6 m)   Wt 89.4 kg (197 lb 3.2 oz)   SpO2 96%   BMI 34.93 kg/m²     General appearance: alert, cooperative, no distress, appears stated age  Lungs: clear to auscultation bilaterally  Heart: regular rate and rhythm, S1, S2 normal, no murmur, click, rub or gallop  Abdomen: soft, non-tender.  Bowel sounds normal. No masses,  no organomegaly  Extremities: no cyanosis or edema  Neurologic: Grossly normal    Disposition: SNF  Discharge Condition: stable  Patient Instructions:   Current Discharge Medication List      START taking these medications    Details   meropenem 500 mg, ADDaptor 1 Device IVPB 500 mg by IntraVENous route every eight (8) hours for 21 days. Qty: 63 Dose, Refills: 0      pantoprazole (PROTONIX) 40 mg tablet Take 1 Tab by mouth daily for 30 days. Qty: 30 Tab, Refills: 2      vancomycin 10 gram 1,250 mg IVPB 1,250 mg by IntraVENous route every twenty-four (24) hours for 21 days. Qty: 21 Dose, Refills: 0         CONTINUE these medications which have CHANGED    Details   metoprolol tartrate (LOPRESSOR) 25 mg tablet Take 0.5 Tabs by mouth every twelve (12) hours for 30 days. Qty: 30 Tab, Refills: 2         CONTINUE these medications which have NOT CHANGED    Details   clopidogrel (PLAVIX) 75 mg tab Take 75 mg by mouth. insulin glargine (LANTUS) 100 unit/mL injection 50 Units by SubCUTAneous route nightly. Qty: 1 Vial, Refills: 0      insulin lispro (HUMALOG) 100 unit/mL injection Per SSI  Qty: 1 Vial, Refills: 0      Blood-Glucose Meter (ONETOUCH ULTRAMINI) monitoring kit Use as directed  Qty: 1 Kit, Refills: 0    Comments: Dx: 250.00 insulin based      glucose blood VI test strips (ONETOUCH ULTRA TEST) strip Test 4 times daily as directed  Qty: 100 Strip, Refills: 11    Comments: Dx: 250.00 insulin based      Lancets (ONETOUCH ULTRASOFT LANCETS) misc Test 4 times daily as directed  Qty: 100 Each, Refills: 11    Comments: Dx: 250.00 insulin based      losartan (COZAAR) 100 mg tablet Take  by mouth daily. Associated Diagnoses: CAD (coronary artery disease); HTN (hypertension)      Omeprazole delayed release (PRILOSEC D/R) 20 mg tablet Take 1 Tab by mouth daily. Qty: 30 Tab, Refills: 5    Associated Diagnoses: GERD (gastroesophageal reflux disease)      rosuvastatin (CRESTOR) 20 mg tablet Take 1 Tab by mouth nightly.   Qty: 90 Tab, Refills: 1    Associated Diagnoses: CAD (coronary artery disease); HLD (hyperlipidemia)      gabapentin (NEURONTIN) 100 mg capsule Take 1 Cap by mouth three (3) times daily. Qty: 30 Cap, Refills: 5    Associated Diagnoses: Peripheral neuropathy      colchicine (COLCRYS) 0.6 mg tablet Take 0.6 mg by mouth three (3) times daily as needed. rOPINIRole (REQUIP) 0.5 mg tablet Take 1 Tab by mouth nightly as needed. Qty: 90 Tab, Refills: 1    Associated Diagnoses: RLS (restless legs syndrome)         STOP taking these medications       furosemide (LASIX) 20 mg tablet Comments:   Reason for Stopping:               Activity: Activity as tolerated  Diet: Cardiac Diet and Diabetic Diet  Wound Care: None needed    Follow-up  · Follow up with physician at SNF  · Follow up with GI and ID as scheduled.     Time spent to discharge patient 35 minutes  Signed:  Dawna Knight MD  8/16/2019  7:43 AM

## 2019-08-17 LAB
BACTERIA SPEC CULT: NORMAL
BACTERIA SPEC CULT: NORMAL
SERVICE CMNT-IMP: NORMAL
SERVICE CMNT-IMP: NORMAL

## 2019-08-19 ENCOUNTER — PATIENT OUTREACH (OUTPATIENT)
Dept: CASE MANAGEMENT | Age: 79
End: 2019-08-19

## 2019-08-19 NOTE — PROGRESS NOTES
This note will not be viewable in [address] Patient discharged to [facility] on 8/16/19. JES[facility] outreach postponed for 21 days due to discharge to non-preferred network SNF.

## 2019-08-26 ENCOUNTER — HOSPITAL ENCOUNTER (OUTPATIENT)
Dept: LAB | Age: 79
Discharge: HOME | End: 2019-08-26
Payer: MEDICARE

## 2019-08-26 LAB — VANCOMYCIN TROUGH SERPL-MCNC: 18.1 UG/ML (ref 5–20)

## 2019-08-26 PROCEDURE — 80202 ASSAY OF VANCOMYCIN: CPT

## 2019-09-02 ENCOUNTER — HOSPITAL ENCOUNTER (OUTPATIENT)
Dept: LAB | Age: 79
Discharge: HOME | End: 2019-09-02
Payer: MEDICARE

## 2019-09-02 LAB — VANCOMYCIN TROUGH SERPL-MCNC: 21.9 UG/ML (ref 5–20)

## 2019-09-02 PROCEDURE — 80202 ASSAY OF VANCOMYCIN: CPT

## 2019-09-06 ENCOUNTER — PATIENT OUTREACH (OUTPATIENT)
Dept: CASE MANAGEMENT | Age: 79
End: 2019-09-06

## 2019-09-06 ENCOUNTER — HOSPITAL ENCOUNTER (OUTPATIENT)
Dept: LAB | Age: 79
Discharge: HOME | End: 2019-09-06
Payer: MEDICARE

## 2019-09-06 LAB — VANCOMYCIN TROUGH SERPL-MCNC: 15.3 UG/ML (ref 5–20)

## 2019-09-06 PROCEDURE — 80202 ASSAY OF VANCOMYCIN: CPT

## 2019-09-06 NOTE — PROGRESS NOTES
This note will not be viewable in [address] Attempted [facility]ES[facility] outreach, verified with staff at [facility], Esther Sweeney, patient remains current admission. [facility] is not indicated at this time. Case will be closed.

## 2019-09-16 ENCOUNTER — HOSPITAL ENCOUNTER (OUTPATIENT)
Age: 79
Setting detail: OBSERVATION
Discharge: HOME HEALTH | End: 2019-09-20
Attending: EMERGENCY MEDICINE | Admitting: HOSPITALIST
Payer: MEDICARE

## 2019-09-16 ENCOUNTER — APPOINTMENT (OUTPATIENT)
Dept: ULTRASOUND IMAGING | Age: 79
End: 2019-09-16
Attending: INTERNAL MEDICINE
Payer: MEDICARE

## 2019-09-16 ENCOUNTER — ANESTHESIA EVENT (OUTPATIENT)
Dept: ENDOSCOPY | Age: 79
End: 2019-09-16
Payer: MEDICARE

## 2019-09-16 DIAGNOSIS — N17.9 ACUTE KIDNEY INJURY (HCC): Primary | ICD-10-CM

## 2019-09-16 PROBLEM — R63.8 DECREASED ORAL INTAKE: Status: ACTIVE | Noted: 2019-09-16

## 2019-09-16 PROBLEM — R53.1 GENERALIZED WEAKNESS: Status: ACTIVE | Noted: 2019-09-16

## 2019-09-16 LAB
ABO + RH BLD: NORMAL
ALBUMIN SERPL-MCNC: 3.3 G/DL (ref 3.2–4.6)
ALBUMIN/GLOB SERPL: 0.7 {RATIO} (ref 1.2–3.5)
ALP SERPL-CCNC: 219 U/L (ref 50–136)
ALT SERPL-CCNC: 14 U/L (ref 12–65)
ANION GAP SERPL CALC-SCNC: 8 MMOL/L (ref 7–16)
APPEARANCE UR: CLEAR
AST SERPL-CCNC: 17 U/L (ref 15–37)
BASOPHILS # BLD: 0.1 K/UL (ref 0–0.2)
BASOPHILS NFR BLD: 1 % (ref 0–2)
BILIRUB SERPL-MCNC: 0.4 MG/DL (ref 0.2–1.1)
BILIRUB UR QL: NEGATIVE
BLOOD GROUP ANTIBODIES SERPL: NORMAL
BUN SERPL-MCNC: 43 MG/DL (ref 8–23)
CALCIUM SERPL-MCNC: 9.3 MG/DL (ref 8.3–10.4)
CHLORIDE SERPL-SCNC: 101 MMOL/L (ref 98–107)
CHLORIDE UR-SCNC: 89 MMOL/L
CO2 SERPL-SCNC: 28 MMOL/L (ref 21–32)
COLOR UR: YELLOW
CREAT SERPL-MCNC: 3.23 MG/DL (ref 0.6–1)
CREAT UR-MCNC: 82.6 MG/DL
DIFFERENTIAL METHOD BLD: ABNORMAL
EOSINOPHIL # BLD: 0.1 K/UL (ref 0–0.8)
EOSINOPHIL #/AREA URNS HPF: NEGATIVE /[HPF]
EOSINOPHIL NFR BLD: 1 % (ref 0.5–7.8)
ERYTHROCYTE [DISTWIDTH] IN BLOOD BY AUTOMATED COUNT: 16 % (ref 11.9–14.6)
GLOBULIN SER CALC-MCNC: 4.5 G/DL (ref 2.3–3.5)
GLUCOSE BLD STRIP.AUTO-MCNC: 185 MG/DL (ref 65–100)
GLUCOSE BLD STRIP.AUTO-MCNC: 230 MG/DL (ref 65–100)
GLUCOSE SERPL-MCNC: 255 MG/DL (ref 65–100)
GLUCOSE UR STRIP.AUTO-MCNC: NEGATIVE MG/DL
HCT VFR BLD AUTO: 34.1 % (ref 35.8–46.3)
HEMOCCULT STL QL: NEGATIVE
HGB BLD-MCNC: 10.5 G/DL (ref 11.7–15.4)
HGB UR QL STRIP: NEGATIVE
IMM GRANULOCYTES # BLD AUTO: 0 K/UL (ref 0–0.5)
IMM GRANULOCYTES NFR BLD AUTO: 0 % (ref 0–5)
KETONES UR QL STRIP.AUTO: NEGATIVE MG/DL
LACTATE BLD-SCNC: 1.55 MMOL/L (ref 0.5–1.9)
LEUKOCYTE ESTERASE UR QL STRIP.AUTO: NEGATIVE
LYMPHOCYTES # BLD: 1.3 K/UL (ref 0.5–4.6)
LYMPHOCYTES NFR BLD: 19 % (ref 13–44)
MCH RBC QN AUTO: 28.5 PG (ref 26.1–32.9)
MCHC RBC AUTO-ENTMCNC: 30.8 G/DL (ref 31.4–35)
MCV RBC AUTO: 92.4 FL (ref 79.6–97.8)
MONOCYTES # BLD: 0.6 K/UL (ref 0.1–1.3)
MONOCYTES NFR BLD: 9 % (ref 4–12)
NEUTS SEG # BLD: 4.7 K/UL (ref 1.7–8.2)
NEUTS SEG NFR BLD: 70 % (ref 43–78)
NITRITE UR QL STRIP.AUTO: NEGATIVE
NRBC # BLD: 0 K/UL (ref 0–0.2)
PH UR STRIP: 6.5 [PH] (ref 5–9)
PLATELET # BLD AUTO: 322 K/UL (ref 150–450)
PMV BLD AUTO: 10.4 FL (ref 9.4–12.3)
POTASSIUM SERPL-SCNC: 5.2 MMOL/L (ref 3.5–5.1)
POTASSIUM UR-SCNC: 26 MMOL/L
PROCALCITONIN SERPL-MCNC: 0.1 NG/ML
PROT SERPL-MCNC: 7.8 G/DL (ref 6.3–8.2)
PROT UR STRIP-MCNC: NEGATIVE MG/DL
PROT UR-MCNC: 13 MG/DL
PROT/CREAT UR-RTO: 0.2
RBC # BLD AUTO: 3.69 M/UL (ref 4.05–5.2)
SODIUM SERPL-SCNC: 137 MMOL/L (ref 136–145)
SODIUM UR-SCNC: 95 MMOL/L
SP GR UR REFRACTOMETRY: 1.01 (ref 1–1.02)
SPECIMEN EXP DATE BLD: NORMAL
UROBILINOGEN UR QL STRIP.AUTO: 0.2 EU/DL (ref 0.2–1)
WBC # BLD AUTO: 6.7 K/UL (ref 4.3–11.1)

## 2019-09-16 PROCEDURE — 74011250636 HC RX REV CODE- 250/636: Performed by: EMERGENCY MEDICINE

## 2019-09-16 PROCEDURE — 81003 URINALYSIS AUTO W/O SCOPE: CPT

## 2019-09-16 PROCEDURE — 84133 ASSAY OF URINE POTASSIUM: CPT

## 2019-09-16 PROCEDURE — 87205 SMEAR GRAM STAIN: CPT

## 2019-09-16 PROCEDURE — 74011000302 HC RX REV CODE- 302: Performed by: HOSPITALIST

## 2019-09-16 PROCEDURE — 84300 ASSAY OF URINE SODIUM: CPT

## 2019-09-16 PROCEDURE — 84145 PROCALCITONIN (PCT): CPT

## 2019-09-16 PROCEDURE — 82436 ASSAY OF URINE CHLORIDE: CPT

## 2019-09-16 PROCEDURE — 74011250637 HC RX REV CODE- 250/637: Performed by: HOSPITALIST

## 2019-09-16 PROCEDURE — 96360 HYDRATION IV INFUSION INIT: CPT | Performed by: EMERGENCY MEDICINE

## 2019-09-16 PROCEDURE — 87086 URINE CULTURE/COLONY COUNT: CPT

## 2019-09-16 PROCEDURE — 96372 THER/PROPH/DIAG INJ SC/IM: CPT

## 2019-09-16 PROCEDURE — 74011636637 HC RX REV CODE- 636/637: Performed by: HOSPITALIST

## 2019-09-16 PROCEDURE — 86900 BLOOD TYPING SEROLOGIC ABO: CPT

## 2019-09-16 PROCEDURE — 99285 EMERGENCY DEPT VISIT HI MDM: CPT | Performed by: EMERGENCY MEDICINE

## 2019-09-16 PROCEDURE — 82962 GLUCOSE BLOOD TEST: CPT

## 2019-09-16 PROCEDURE — 65270000029 HC RM PRIVATE

## 2019-09-16 PROCEDURE — 86580 TB INTRADERMAL TEST: CPT | Performed by: HOSPITALIST

## 2019-09-16 PROCEDURE — 80053 COMPREHEN METABOLIC PANEL: CPT

## 2019-09-16 PROCEDURE — 82270 OCCULT BLOOD FECES: CPT

## 2019-09-16 PROCEDURE — 65390000012 HC CONDITION CODE 44 OBSERVATION

## 2019-09-16 PROCEDURE — 74011250636 HC RX REV CODE- 250/636: Performed by: HOSPITALIST

## 2019-09-16 PROCEDURE — 85025 COMPLETE CBC W/AUTO DIFF WBC: CPT

## 2019-09-16 PROCEDURE — 76770 US EXAM ABDO BACK WALL COMP: CPT

## 2019-09-16 PROCEDURE — 83605 ASSAY OF LACTIC ACID: CPT

## 2019-09-16 PROCEDURE — 84156 ASSAY OF PROTEIN URINE: CPT

## 2019-09-16 PROCEDURE — 77030020255 HC SOL INJ LR 1000ML BG

## 2019-09-16 RX ORDER — INSULIN LISPRO 100 [IU]/ML
INJECTION, SOLUTION INTRAVENOUS; SUBCUTANEOUS
Status: DISCONTINUED | OUTPATIENT
Start: 2019-09-16 | End: 2019-09-20 | Stop reason: HOSPADM

## 2019-09-16 RX ORDER — SODIUM CHLORIDE, SODIUM LACTATE, POTASSIUM CHLORIDE, CALCIUM CHLORIDE 600; 310; 30; 20 MG/100ML; MG/100ML; MG/100ML; MG/100ML
100 INJECTION, SOLUTION INTRAVENOUS CONTINUOUS
Status: CANCELLED | OUTPATIENT
Start: 2019-09-16

## 2019-09-16 RX ORDER — CLOPIDOGREL BISULFATE 75 MG/1
75 TABLET ORAL DAILY
Status: DISCONTINUED | OUTPATIENT
Start: 2019-09-17 | End: 2019-09-17

## 2019-09-16 RX ORDER — HEPARIN SODIUM 5000 [USP'U]/ML
5000 INJECTION, SOLUTION INTRAVENOUS; SUBCUTANEOUS EVERY 12 HOURS
Status: DISCONTINUED | OUTPATIENT
Start: 2019-09-16 | End: 2019-09-20 | Stop reason: HOSPADM

## 2019-09-16 RX ORDER — SULFAMETHOXAZOLE AND TRIMETHOPRIM 800; 160 MG/1; MG/1
1 TABLET ORAL 2 TIMES DAILY
COMMUNITY
End: 2019-09-20

## 2019-09-16 RX ORDER — SODIUM POLYSTYRENE SULFONATE 4.1 MEQ/G
15 POWDER, FOR SUSPENSION ORAL; RECTAL
Status: DISCONTINUED | OUTPATIENT
Start: 2019-09-16 | End: 2019-09-16 | Stop reason: SDUPTHER

## 2019-09-16 RX ORDER — METOPROLOL TARTRATE 25 MG/1
12.5 TABLET, FILM COATED ORAL EVERY 12 HOURS
Status: DISCONTINUED | OUTPATIENT
Start: 2019-09-16 | End: 2019-09-20 | Stop reason: HOSPADM

## 2019-09-16 RX ORDER — AMOXICILLIN 250 MG
1 CAPSULE ORAL DAILY
Status: DISCONTINUED | OUTPATIENT
Start: 2019-09-17 | End: 2019-09-20 | Stop reason: HOSPADM

## 2019-09-16 RX ORDER — ACETAMINOPHEN 325 MG/1
650 TABLET ORAL
Status: DISCONTINUED | OUTPATIENT
Start: 2019-09-16 | End: 2019-09-20 | Stop reason: HOSPADM

## 2019-09-16 RX ORDER — PANTOPRAZOLE SODIUM 40 MG/1
40 TABLET, DELAYED RELEASE ORAL
Status: DISCONTINUED | OUTPATIENT
Start: 2019-09-16 | End: 2019-09-20 | Stop reason: HOSPADM

## 2019-09-16 RX ORDER — SODIUM POLYSTYRENE SULFONATE 15 G/60ML
15 SUSPENSION ORAL; RECTAL
Status: COMPLETED | OUTPATIENT
Start: 2019-09-16 | End: 2019-09-16

## 2019-09-16 RX ORDER — GABAPENTIN 100 MG/1
100 CAPSULE ORAL 3 TIMES DAILY
Status: DISCONTINUED | OUTPATIENT
Start: 2019-09-16 | End: 2019-09-20 | Stop reason: HOSPADM

## 2019-09-16 RX ORDER — INSULIN GLARGINE 100 [IU]/ML
30 INJECTION, SOLUTION SUBCUTANEOUS
Status: DISCONTINUED | OUTPATIENT
Start: 2019-09-16 | End: 2019-09-18

## 2019-09-16 RX ORDER — ROSUVASTATIN CALCIUM 20 MG/1
20 TABLET, COATED ORAL
Status: DISCONTINUED | OUTPATIENT
Start: 2019-09-16 | End: 2019-09-20 | Stop reason: HOSPADM

## 2019-09-16 RX ORDER — ROPINIROLE 0.5 MG/1
0.5 TABLET, FILM COATED ORAL
Status: DISCONTINUED | OUTPATIENT
Start: 2019-09-16 | End: 2019-09-20 | Stop reason: HOSPADM

## 2019-09-16 RX ORDER — ONDANSETRON 2 MG/ML
4 INJECTION INTRAMUSCULAR; INTRAVENOUS
Status: DISCONTINUED | OUTPATIENT
Start: 2019-09-16 | End: 2019-09-20 | Stop reason: HOSPADM

## 2019-09-16 RX ORDER — SODIUM CHLORIDE 9 MG/ML
500 INJECTION, SOLUTION INTRAVENOUS ONCE
Status: COMPLETED | OUTPATIENT
Start: 2019-09-16 | End: 2019-09-16

## 2019-09-16 RX ORDER — SODIUM CHLORIDE 0.9 % (FLUSH) 0.9 %
5-40 SYRINGE (ML) INJECTION AS NEEDED
Status: DISCONTINUED | OUTPATIENT
Start: 2019-09-16 | End: 2019-09-20 | Stop reason: HOSPADM

## 2019-09-16 RX ORDER — SODIUM CHLORIDE 0.9 % (FLUSH) 0.9 %
5-40 SYRINGE (ML) INJECTION EVERY 8 HOURS
Status: DISCONTINUED | OUTPATIENT
Start: 2019-09-16 | End: 2019-09-20 | Stop reason: HOSPADM

## 2019-09-16 RX ORDER — OXYCODONE AND ACETAMINOPHEN 5; 325 MG/1; MG/1
1 TABLET ORAL
Status: DISCONTINUED | OUTPATIENT
Start: 2019-09-16 | End: 2019-09-20 | Stop reason: HOSPADM

## 2019-09-16 RX ORDER — SODIUM CHLORIDE, SODIUM LACTATE, POTASSIUM CHLORIDE, CALCIUM CHLORIDE 600; 310; 30; 20 MG/100ML; MG/100ML; MG/100ML; MG/100ML
125 INJECTION, SOLUTION INTRAVENOUS CONTINUOUS
Status: DISPENSED | OUTPATIENT
Start: 2019-09-16 | End: 2019-09-18

## 2019-09-16 RX ADMIN — PANTOPRAZOLE SODIUM 40 MG: 40 TABLET, DELAYED RELEASE ORAL at 18:32

## 2019-09-16 RX ADMIN — SODIUM CHLORIDE, SODIUM LACTATE, POTASSIUM CHLORIDE, AND CALCIUM CHLORIDE 125 ML/HR: 600; 310; 30; 20 INJECTION, SOLUTION INTRAVENOUS at 15:18

## 2019-09-16 RX ADMIN — METOPROLOL TARTRATE 12.5 MG: 25 TABLET ORAL at 23:16

## 2019-09-16 RX ADMIN — GABAPENTIN 100 MG: 100 CAPSULE ORAL at 23:16

## 2019-09-16 RX ADMIN — HEPARIN SODIUM 5000 UNITS: 5000 INJECTION INTRAVENOUS; SUBCUTANEOUS at 23:16

## 2019-09-16 RX ADMIN — INSULIN LISPRO 2 UNITS: 100 INJECTION, SOLUTION INTRAVENOUS; SUBCUTANEOUS at 18:32

## 2019-09-16 RX ADMIN — INSULIN GLARGINE 30 UNITS: 100 INJECTION, SOLUTION SUBCUTANEOUS at 22:00

## 2019-09-16 RX ADMIN — Medication 10 ML: at 15:18

## 2019-09-16 RX ADMIN — GABAPENTIN 100 MG: 100 CAPSULE ORAL at 18:32

## 2019-09-16 RX ADMIN — SODIUM CHLORIDE 500 ML: 900 INJECTION, SOLUTION INTRAVENOUS at 11:12

## 2019-09-16 RX ADMIN — Medication 10 ML: at 23:18

## 2019-09-16 RX ADMIN — SODIUM POLYSTYRENE SULFONATE 15 G: 15 SUSPENSION ORAL; RECTAL at 18:32

## 2019-09-16 RX ADMIN — INSULIN LISPRO 4 UNITS: 100 INJECTION, SOLUTION INTRAVENOUS; SUBCUTANEOUS at 23:17

## 2019-09-16 RX ADMIN — SODIUM CHLORIDE, SODIUM LACTATE, POTASSIUM CHLORIDE, AND CALCIUM CHLORIDE 125 ML/HR: 600; 310; 30; 20 INJECTION, SOLUTION INTRAVENOUS at 23:20

## 2019-09-16 RX ADMIN — ROSUVASTATIN CALCIUM 20 MG: 20 TABLET, COATED ORAL at 23:16

## 2019-09-16 NOTE — ROUTINE PROCESS
TRANSFER - IN REPORT:    Verbal report received from Stephanie Duckworth RN on Elly Zapata  being received from ED(unit) for routine progression of care      Report consisted of patients Situation, Background, Assessment and   Recommendations(SBAR). Information from the following report(s) SBAR, Kardex, ED Summary, [facility] and Recent Results was reviewed with the receiving nurse. Opportunity for questions and clarification was provided. Assessment completed upon patients arrival to unit and care assumed.

## 2019-09-16 NOTE — CONSULTS
[facility] CONSULT NOTE    Admission Date:  9/16/2019    Admission Diagnosis:  [facility]ute [facility]) [facility] [N17.9]  Generalized weakness [R53.1]  Decreased oral intake [R63.8]    Consulting physician: Jaja Lazaro MD    Reason for consult: PRATIMA    Subjective:   History of Present Illness: Brenda Agarwal is a 78year old female with history of HTN, CAD s/p CABG, HLD, GERD and mild CKD. Baseline creatinine ~ 1.0-1.3. She had recent hospitalization for sepsis with ESBL E.Coli and enterococcus bacteremia following ERCP on 7/24/19. She had repeat ERCP on 8/10/19 for cholangitis, ampullary stenosis and had a stent placed. She was on a 3 week course of antibiotic and plans to transition to Fosfomycin until stent was removed. She completed IV Merrem and vancomycin on 9/6/19. She was started on Bactrim and amoxicillin instead by ID on 9/6/19 due to inability to get the Fosfomycin. She now presents with worsening weakness, fatigue, and poor po intake for 3-4 days. Creatinine was noted to be 3.23 on admission. Potassium was 5.2. Nephrology is consulted for evaluation of PRATIMA. She has been started on IVF. Her ARB is held and Bactrim stopped.      Past Medical History:   Diagnosis Date    Acute cystitis without hematuria 1/30/2019    CAD (coronary artery disease)     DM2 (diabetes mellitus, type 2) (HCC)     Gout     HLD (hyperlipidemia)     HTN (hypertension)     Peripheral neuropathy       Past Surgical History:   Procedure Laterality Date    CARDIAC SURG PROCEDURE UNLIST      stents x 3 2009    HX CHOLECYSTECTOMY      jennifer in 1964    HX CORONARY ARTERY BYPASS GRAFT      x3    HX TUBAL LIGATION        Current Facility-Administered Medications   Medication Dose Route Frequency    [START ON 9/17/2019] clopidogrel (PLAVIX) tablet 75 mg  75 mg Oral DAILY    gabapentin (NEURONTIN) capsule 100 mg  100 mg Oral TID    metoprolol tartrate (LOPRESSOR) tablet 12.5 mg  12.5 mg Oral Q12H    pantoprazole (PROTONIX) tablet 40 mg  40 mg Oral ACB&D    rOPINIRole (REQUIP) tablet 0.5 mg  0.5 mg Oral QHS PRN    rosuvastatin (CRESTOR) tablet 20 mg  20 mg Oral QHS    sodium chloride (NS) flush 5-40 mL  5-40 mL IntraVENous Q8H    sodium chloride (NS) flush 5-40 mL  5-40 mL IntraVENous PRN    tuberculin injection 5 Units  5 Units IntraDERMal ONCE    acetaminophen (TYLENOL) tablet 650 mg  650 mg Oral Q6H PRN    oxyCODONE-acetaminophen (PERCOCET) 5-325 mg per tablet 1 Tab  1 Tab Oral Q6H PRN    ondansetron (ZOFRAN) injection 4 mg  4 mg IntraVENous Q4H PRN    [START ON 9/17/2019] senna-docusate (PERICOLACE) 8.6-50 mg per tablet 1 Tab  1 Tab Oral DAILY    heparin (porcine) injection 5,000 Units  5,000 Units SubCUTAneous Q12H    insulin lispro (HUMALOG) injection   SubCUTAneous AC&HS    insulin glargine (LANTUS) injection 30 Units  30 Units SubCUTAneous QHS    lactated Ringers infusion  125 mL/hr IntraVENous CONTINUOUS    sodium polystyrene (KAYEXALATE) 15 gram/60 mL oral suspension 15 g  15 g Oral NOW     Allergies   Allergen Reactions    Sulfa (Sulfonamide Antibiotics) Swelling      Social History     Tobacco Use    Smoking status: Never Smoker    Smokeless tobacco: Never Used   Substance Use Topics    Alcohol use: Yes     Comment: socially      Family History   Problem Relation Age of Onset    Hypertension Mother     Cancer Mother     Diabetes Mother     Heart Disease Mother         Review of Systems  Gen - no fever, no chills, appetite poor, + weakness  HEENT - no sore throat, no decreased vision, no hearing loss  Neck - no neck mass  CV - no chest pain, no palpitation, no orthopnea  Lung - no shortness of breath, no cough, no hemoptysis  Abd - no tenderness, no nausea/vomiting, no bloody stool  Ext - no edema, no clubbing, no cyanosis  Musculoskeletal - no joint pain, no back pain  Neurologic - no headaches, no dizziness, no seizures  Psychiatric - no anxiety, no depression  Skin - no rashes, no pupura  Genitourinary - no decreased urine output, no hematuria, no foamy urine    Objective:   Vitals:    09/16/19 1258 09/16/19 1328 09/16/19 1358 09/16/19 1428   BP: 148/72 148/76 156/78 153/76   Pulse: 81 84 79 83   Resp:       Temp:       SpO2: 100% 100% 99% 99%   Weight:       Height:           Intake/Output Summary (Last 24 hours) at 9/16/2019 1511  Last data filed at 9/16/2019 1408  Gross per 24 hour   Intake 1000 ml   Output    Net 1000 ml       Physical Exam  GEN :in no distress, alert and oriented  HEENT: anicteric sclerae, eomi. Oropharynx without lesions. Mucous membranes are moist.  Neck - supple without JVD, no thyromegaly. No lymphadenopathy. CV - regular rate and rhythm, no murmur, no rub  Lung - clear bilaterally, lungs expand symmetrically  Chest wall - normal appearance  Abd - soft, nontender, bowel sounds present, no hepatosplenomegaly  Ext - no clubbing, no cyanosis, no edema  Neurologic - nonfocal  Genitourinary - bladder nonpalpable  Skin - no rashes, no purpura, no ecchymoses  Psychiatric: Normal mood and affect. Data Review:   Recent Labs     09/16/19  1129   WBC 6.7   HGB 10.5*   HCT 34.1*        Recent Labs     09/16/19  1129      K 5.2*      CO2 28   BUN 43*   CREA 3.23*   *   CA 9.3     No results for input(s): PH, PCO2, PO2, PCO2 in the last 72 hours.     Problem List:     Patient Active Problem List    Diagnosis Date Noted    Generalized weakness 09/16/2019    Decreased oral intake 09/16/2019    Acute pancreatitis 08/12/2019    Gram-negative bacteremia 08/09/2019    Acute renal failure (ARF) ([facility] 08/08/2019    Elevated liver function tests 08/08/2019    Anemia 08/08/2019    Right lower quadrant abdominal pain 08/08/2019    Coronary artery disease involving coronary bypass graft of native heart without angina pectoris 06/15/2015    Uncontrolled type 2 diabetes mellitus with hyperglycemia ([facility] 06/15/2015    Essential hypertension 06/15/2015  HLD (hyperlipidemia) 06/15/2015    Chronic kidney disease, stage III (moderate) (HCC) 06/15/2015    Osteopenia 06/15/2015    Vitamin D deficiency 06/15/2015    Gastroesophageal reflux disease without esophagitis 06/15/2015    Other allergic rhinitis 06/15/2015    Peripheral neuropathy 06/15/2015    Primary osteoarthritis involving multiple joints 06/15/2015    Insomnia 06/15/2015    RLS (restless legs syndrome) 06/15/2015       Assessment and Plan:  PRATIMA on stage 3 CKD  - baseline creatinine 1.0-1.3  - PRATIMA in setting of poor po intake, Bactrim, ARB medication  - agree with stopping Bactrim, holding losartan  - continue IVF  - check renal US, urinalysis, urine electrolytes  - follow labs    Recent cholangitis s/p biliary stent  - for stent removal per GI 9/17      HERMINIO Linton

## 2019-09-16 NOTE — ED PROVIDER NOTES
[facility] Emergency Department  Arrival Date/Time: 9/16/2019 10:55 AM      Farzaneh Maradiaga  MRN: [id number]    YOB: 1940   78 y.o. female    [facility] EMERGENCY DEPT ER06/06  Seen on 9/16/2019 @ 11:01 AM      Today's Chief Complaint:   Chief Complaint   Patient presents with    Rectal Bleeding     HPI: 78-year-old female presents to the emergency department with female generalized malaise. She is had a recent complicated 6 weeks. Had a sphincterotomy. Subsequently had about a bile leak. Had a stent placed. Was in the hospital.  In the intensive care unit. HPI    Review of Systems: Review of Systems   Constitutional: Positive for activity change, appetite change, chills and fatigue. Negative for fever. HENT: Negative. Eyes: Negative. Respiratory: Negative. Cardiovascular: Negative. Gastrointestinal: Positive for abdominal pain, nausea and vomiting. Genitourinary: Negative. Musculoskeletal: Negative. Skin: Negative. Neurological: Negative. Psychiatric/Behavioral: Negative. Past Medical History: Primary Care Doctor: Other, MD Mireille  Meds, PMH, PSHx, SocHx at end of this note     Allergies: Allergies   Allergen Reactions    Sulfa (Sulfonamide Antibiotics) Swelling         Key Anti-Platelet Anticoagulant Meds             clopidogrel (PLAVIX) 75 mg tab Take 75 mg by mouth. Physical Exam:  Nursing documentation reviewed. Vitals:    09/16/19 1005   BP: 118/66   Pulse: 90   Resp: 18   Temp: 97.8 °F (36.6 °C)   SpO2: 91%    Vital signs were reviewed. Physical Exam   Constitutional: She is oriented to person, place, and time. No distress. Elderly, non-toxic, uncomforatable female   HENT:   Head: Normocephalic and atraumatic. Eyes: Pupils are equal, round, and reactive to light. EOM are normal. No scleral icterus. Neck: Normal range of motion. Cardiovascular: Normal rate and regular rhythm.    Pulmonary/Chest: Breath sounds normal. No stridor. No respiratory distress. Abdominal: Bowel sounds are normal. She exhibits no distension. There is no tenderness. Pt reports pain; but does not appear to be uncomfortable on exam   Musculoskeletal: Normal range of motion. She exhibits no edema or deformity. Neurological: She is alert and oriented to person, place, and time. Skin: Skin is warm and dry. Capillary refill takes less than 2 seconds. She is not diaphoretic. No pallor. Psychiatric: She has a normal mood and affect. Her behavior is normal.   Nursing note and vitals reviewed. MEDICAL DECISION MAKING:   Differential Diagnosis:    MDM  Number of Diagnoses or Management Options  Diagnosis management comments: 77 yo female with abd pain, decreased appetitie; increased fatigue    Not eating or drinking very much    No fever. No chills. Differential includes pancreatitis, recurrent cholangitis, colitis, bacteremia    Check labs. Start IV fluids. Reassess       Amount and/or Complexity of Data Reviewed  Clinical lab tests: ordered and reviewed  Decide to obtain previous medical records or to obtain history from someone other than the patient: yes  Obtain history from someone other than the patient: yes  Review and summarize past medical records: yes  Discuss the patient with other providers: yes    Risk of Complications, Morbidity, and/or Mortality  Presenting problems: moderate  Diagnostic procedures: minimal  Management options: high            Old Record Review:     Endoscopic Retrograde Cholangiopancreatogram   DATE of PROCEDURE: 8/10/2019   POSTPROCEDURE DIAGNOSIS:  1.  Possible cholangitis   MEDICATIONS ADMINISTERED:  GETA   INSTRUMENT: [id number]   PROCEDURE:  After obtaining informed consent, the patient was placed in prone position on the fluoroscopy table.  The patient was then sedated.  The endoscope was passed under indirect technique to the oropharynx and gently passed into the esophagus.  The endoscope was passed to the ampulla.  Limited views of the stomach and duodenum were obtained.  After the procedure, the patient was taken to the recovery area in stable condition.   Ampulla: The ampulla was located in the expected position and was edematous and bulging into the second portion of the duodenum. There was evidence of prior sphincterotomy with no active bleeding.    Cannulation: Cannulation performed on first attempt with the short nose traction sphincterotome preloaded with a 0.035 inch guidewire which was used for deep cannulation of the biliary tree. Contrast was injected resulting in complete opacification.    Cholangiogram:  Initial cholangiogram demonstrated a dilated CBD with apparent stenosis in the distal CBD, likely from the edematous ampulla. After cannulation, when the tome was lifted, thick black bile with small wisps of white pus were seen.    Interventions:   The CBD was swept multiple times with the 12 mm biliary extraction balloon, producing a thick black bile/sludge but no stones. There was no evidence of contrast extravasation. The intrahepatic biliary tree appeared normal. Final sweeps were clear and produced clear green bile.    The bile duct was observed under fluoroscopy, and contrast did not appear to drain. There appeared to be a smooth cutoff in the distal CBD as seen on image 5, likely from the edematous ampulla.    Next, one 10 Fr x 9 cm plastic biliary stent (7 cm not available) was placed in the common bile duct. Green bile was seen flowing through the stent. The stent was placed in good position.     Estimated Blood Loss: 0 cc-min   ASSESSMENT:  1. Possible Cholangitis   2. Ampullary stenosis and edema   3. Placement of 10 Fr biliary stent    PLAN:  1. Follow up with inpatient team   2. Follow LFTs  3. Continue antibiotic therapy, follow microbiology culture data   4. If she declines clinically, consider urinary source as she has history of ESBL E.coli in the urine   5.  Repeat ERCP in 4-6 weeks for stent management     Hospitalist D/C Note:  Please refer to the admission H&P for details of presentation. In summary, the patient is 78years old female with hx of CABG, HTN, HLD, CKD-3, s/p cholecystectomy, GERD admitted on 8/8/19 for sepsis, acute pancreatitis, ischemic/shock liver, lactic acidosis, PRATIMA, transaminits. Pt had a recent ERCP on 7/24 for dilated CBD with only small stone. Pt also has ESBL E.coli and Enterococcus in urine culture and GNR on blood culture. Pt likely developed post ERCP pancreatitis and possible cholangitis. Pt had repeat ERCP on 8/10 with placement of CBD stent. Pt was transferred from ICU to medical floor as she was clinically improving and hemodynamically stable. Pt had TTE, infective endocarditis was ruled out. Repeat blood cultures from 8/12/19 were negative for 72 hours, had PICC line paced on 8/14/19. ID recommended to continue IV Vancomycin 1250 mg Q24hrs (aim trough~15), and Merrem 500 mg IV Q8hrs for 3 weeks with EOT 9/6/19 with transition to Fosfomycin 3gm PO Q3days until ERCP and stent removal.  Pt's renal function and liver enzymes has normalized. Pt is is medically cleared and clinically stable for discharge.      Data/Management:      Lab findings during this visit (only abnormal values will be noted, if no value noted then the result   was normal range):   Recent Results (from the past 48 hour(s))   CBC WITH AUTOMATED DIFF    Collection Time: 09/16/19 11:29 AM   Result Value Ref Range    WBC 6.7 4.3 - 11.1 K/uL    RBC 3.69 (L) 4.05 - 5.2 M/uL    HGB 10.5 (L) 11.7 - 15.4 g/dL    HCT 34.1 (L) 35.8 - 46.3 %    MCV 92.4 79.6 - 97.8 FL    MCH 28.5 26.1 - 32.9 PG    MCHC 30.8 (L) 31.4 - 35.0 g/dL    RDW 16.0 (H) 11.9 - 14.6 %    PLATELET 006 821 - 737 K/uL    MPV 10.4 9.4 - 12.3 FL    ABSOLUTE NRBC 0.00 0.0 - 0.2 K/uL    DF AUTOMATED      NEUTROPHILS 70 43 - 78 %    LYMPHOCYTES 19 13 - 44 %    MONOCYTES 9 4.0 - 12.0 %    EOSINOPHILS 1 0.5 - 7.8 %    BASOPHILS 1 0.0 - 2.0 % IMMATURE GRANULOCYTES 0 0.0 - 5.0 %    ABS. NEUTROPHILS 4.7 1.7 - 8.2 K/UL    ABS. LYMPHOCYTES 1.3 0.5 - 4.6 K/UL    ABS. MONOCYTES 0.6 0.1 - 1.3 K/UL    ABS. EOSINOPHILS 0.1 0.0 - 0.8 K/UL    ABS. BASOPHILS 0.1 0.0 - 0.2 K/UL    ABS. IMM. GRANS. 0.0 0.0 - 0.5 K/UL   METABOLIC PANEL, COMPREHENSIVE    Collection Time: 09/16/19 11:29 AM   Result Value Ref Range    Sodium 137 136 - 145 mmol/L    Potassium 5.2 (H) 3.5 - 5.1 mmol/L    Chloride 101 98 - 107 mmol/L    CO2 28 21 - 32 mmol/L    Anion gap 8 7 - 16 mmol/L    Glucose 255 (H) 65 - 100 mg/dL    BUN 43 (H) 8 - 23 MG/DL    Creatinine 3.23 (H) 0.6 - 1.0 MG/DL    GFR est AA 18 (L) >60 ml/min/1.73m2    GFR est non-AA 15 (L) >60 ml/min/1.73m2    Calcium 9.3 8.3 - 10.4 MG/DL    Bilirubin, total 0.4 0.2 - 1.1 MG/DL    ALT (SGPT) 14 12 - 65 U/L    AST (SGOT) 17 15 - 37 U/L    Alk. phosphatase 219 (H) 50 - 136 U/L    Protein, total 7.8 6.3 - 8.2 g/dL    Albumin 3.3 3.2 - 4.6 g/dL    Globulin 4.5 (H) 2.3 - 3.5 g/dL    A-G Ratio 0.7 (L) 1.2 - 3.5     TYPE & SCREEN    Collection Time: 09/16/19 11:29 AM   Result Value Ref Range    Crossmatch Expiration 09/19/2019     ABO/Rh(D) B POSITIVE     Antibody screen NEG    PROCALCITONIN    Collection Time: 09/16/19 11:29 AM   Result Value Ref Range    Procalcitonin 0.1 ng/mL   POC LACTIC ACID    Collection Time: 09/16/19 11:40 AM   Result Value Ref Range    Lactic Acid (POC) 1.55 0.5 - 1.9 mmol/L       Radiology studies during this visit: No results found. Medications given in the ED:   Medications   0.9% sodium chloride infusion 500 mL (has no administration in time range)   insulin lispro (HUMALOG) injection (has no administration in time range)   insulin glargine (LANTUS) injection 30 Units (has no administration in time range)   lactated Ringers infusion (has no administration in time range)       Recheck and Additional Documentation (Sepsis, Stroke, Hip):  (use .addsepsis   . addstroke   . addhip)       Sepsis Documentation This patient presented with abdominal pain. White count was normal.  No fever. Slightly tachycardic at 90. Not hypotensive. The patient's lactate was:   Recent Labs     09/16/19  1140   LACPOC 1.55      The patient did not have signs of end organ dysfunction (INR, Plt<100, Bili >2, Cr >2, AMS)  The patient did not present with initial hypotension. The patient did not have 2 episodes of hypotension (SBP <90, or MAP <65). The patient was resuscitated with IV fluids. A 30ml/kg bolus was not given based on the patients lactate and blood pressure. This patient will be admitted to the hospitalist service. D/w Dr. Marilin Daniels from   They will come and see    12:49 PM, 9/16/2019, Namita Dhaliwal MD       Procedure Documentation: Procedures     Assessment and Plan:      Impression:     ICD-10-CM ICD-9-CM   1. Acute kidney injury ([facility] N17.9 584.9       Disposition: admit      Follow-up:   Follow-up Information    None         Discharge Medications:   Current Discharge Medication List          Namita Dhaliwal MD; 09/16/19  11:01 AM    Other ED Course Notes:        Past Medical History:      Past Medical History:   Diagnosis Date    Acute cystitis without hematuria 1/30/2019    CAD (coronary artery disease)     DM2 (diabetes mellitus, type 2) ([facility]     Gout     HLD (hyperlipidemia)     HTN (hypertension)     Peripheral neuropathy      Past Surgical History:   Procedure Laterality Date    CARDIAC SURG PROCEDURE UNLIST      stents x 3 2009    HX CHOLECYSTECTOMY      jennifer in 1964    HX CORONARY ARTERY BYPASS GRAFT      x3    HX TUBAL LIGATION       Social History     Tobacco Use    Smoking status: Never Smoker    Smokeless tobacco: Never Used   Substance Use Topics    Alcohol use: Yes     Comment: socially    Drug use: No     Home Medication:   Prior to Admission Medications   Prescriptions Last Dose Informant Patient Reported? Taking?    Blood-Glucose Meter (ONETOUCH ULTRAMINI) monitoring kit   No No   Sig: Use as directed   Lancets (ONETOUCH ULTRASOFT LANCETS) misc   No No   Sig: Test 4 times daily as directed   Omeprazole delayed release (PRILOSEC D/R) 20 mg tablet   No No   Sig: Take 1 Tab by mouth daily. clopidogrel (PLAVIX) 75 mg tab   Yes No   Sig: Take 75 mg by mouth.   colchicine (COLCRYS) 0.6 mg tablet   Yes No   Sig: Take 0.6 mg by mouth three (3) times daily as needed. gabapentin (NEURONTIN) 100 mg capsule   No No   Sig: Take 1 Cap by mouth three (3) times daily. glucose blood VI test strips (ONETOUCH ULTRA TEST) strip   No No   Sig: Test 4 times daily as directed   insulin glargine (LANTUS) 100 unit/mL injection   No No   Si Units by SubCUTAneous route nightly. insulin lispro (HUMALOG) 100 unit/mL injection   No No   Sig: Per SSI   losartan (COZAAR) 100 mg tablet   Yes No   Sig: Take  by mouth daily. metoprolol tartrate (LOPRESSOR) 25 mg tablet   No No   Sig: Take 0.5 Tabs by mouth every twelve (12) hours for 30 days. pantoprazole (PROTONIX) 40 mg tablet   No No   Sig: Take 1 Tab by mouth daily for 30 days. rOPINIRole (REQUIP) 0.5 mg tablet   No No   Sig: Take 1 Tab by mouth nightly as needed. rosuvastatin (CRESTOR) 20 mg tablet   No No   Sig: Take 1 Tab by mouth nightly. trimethoprim-sulfamethoxazole (BACTRIM DS) 160-800 mg per tablet   Yes Yes   Sig: Take 1 Tab by mouth two (2) times a day.       Facility-Administered Medications: None

## 2019-09-16 NOTE — PROGRESS NOTES
Initial visit to assess pt's spiritual needs. Feeling today? Receiving good care? Needs from Spiritual Care:  prayer    Ministry of presence & prayer to demonstrate caring & concern, convey emotional & spiritual support.     ya Torrez, MDiv,ThM,PhD

## 2019-09-16 NOTE — PROGRESS NOTES
We plan to see in AM. She appears clinically stable, so would hold abx for now given PRATIMA and hyperkalemia. Biliary stent to be removed tomorrow.

## 2019-09-16 NOTE — H&P
HOSPITALIST H&P/CONSULT  NAME:  Alleen Alpers   Age:  78 y.o.  :   1940   MRN:   [id number]  PCP: Devang Bhagat MD  Consulting MD:  Treatment Team: Attending Provider: Neena Callahan MD; Staff Nurse: Rhiannon Rubio RN; Consulting Provider: Megan Hardwick MD  HPI:   Patient is an 78years old female with hx of CABG, HTN, HLD, CKD-1, s/p cholecystectomy, GERD, ESBL E.coli and Enterococcus bacteremia following ERCP on  for which she completed IV Merrem and Vancomycin on 19 presented with generalized weakness and poor oral intake for past 3-4 days. Pt was discharged home recently from [facility], pt was fine for couple of days but then started feeling exhausted, progressively declining overall, feels lethargic and fatigued with minimal exertion. Pt reports poor oral intake. Pt denies any abdominal pain, nausea/vomiting, fever, chills, diarrhea, headache, urinary symptoms. Pt was recently started on Bactrim DS by ID. Pt was also started on oral iron recently and since then pt had noticed some black colored stool. ER work up showed Cr 3.23 (baseline 1.04), K 5.2. Complete ROS done and is as stated in HPI or otherwise negative  Past Medical History:   Diagnosis Date    Acute cystitis without hematuria 2019    CAD (coronary artery disease)     DM2 (diabetes mellitus, type 2) ([facility] [address]     Gout     HLD (hyperlipidemia)     HTN (hypertension)     Peripheral neuropathy       Past Surgical History:   Procedure Laterality Date    CARDIAC SURG PROCEDURE UNLIST      stents x 3     HX CHOLECYSTECTOMY      jennifer in 1964    HX CORONARY ARTERY BYPASS GRAFT      x3    HX TUBAL LIGATION        Prior to Admission Medications   Prescriptions Last Dose Informant Patient Reported? Taking?    Blood-Glucose Meter (ONETOUCH ULTRAMINI) monitoring kit   No No   Sig: Use as directed   Lancets (ONETOUCH ULTRASOFT LANCETS) misc   No No   Sig: Test 4 times daily as directed Omeprazole delayed release (PRILOSEC D/R) 20 mg tablet   No No   Sig: Take 1 Tab by mouth daily. clopidogrel (PLAVIX) 75 mg tab   Yes No   Sig: Take 75 mg by mouth.   colchicine (COLCRYS) 0.6 mg tablet   Yes No   Sig: Take 0.6 mg by mouth three (3) times daily as needed. gabapentin (NEURONTIN) 100 mg capsule   No No   Sig: Take 1 Cap by mouth three (3) times daily. glucose blood VI test strips (ONETOUCH ULTRA TEST) strip   No No   Sig: Test 4 times daily as directed   insulin glargine (LANTUS) 100 unit/mL injection   No No   Si Units by SubCUTAneous route nightly. insulin lispro (HUMALOG) 100 unit/mL injection   No No   Sig: Per SSI   losartan (COZAAR) 100 mg tablet   Yes No   Sig: Take  by mouth daily. metoprolol tartrate (LOPRESSOR) 25 mg tablet   No No   Sig: Take 0.5 Tabs by mouth every twelve (12) hours for 30 days. pantoprazole (PROTONIX) 40 mg tablet   No No   Sig: Take 1 Tab by mouth daily for 30 days. rOPINIRole (REQUIP) 0.5 mg tablet   No No   Sig: Take 1 Tab by mouth nightly as needed. rosuvastatin (CRESTOR) 20 mg tablet   No No   Sig: Take 1 Tab by mouth nightly. trimethoprim-sulfamethoxazole (BACTRIM DS) 160-800 mg per tablet   Yes Yes   Sig: Take 1 Tab by mouth two (2) times a day.       Facility-Administered Medications: None     Allergies   Allergen Reactions    Sulfa (Sulfonamide Antibiotics) Swelling      Social History     Tobacco Use    Smoking status: Never Smoker    Smokeless tobacco: Never Used   Substance Use Topics    Alcohol use: Yes     Comment: socially      Family History   Problem Relation Age of Onset    Hypertension Mother     Cancer Mother     Diabetes Mother     Heart Disease Mother       Objective:     Visit Vitals  /66 (BP 1 Location: Right arm, BP Patient Position: At rest)   Pulse 90   Temp 97.8 °F (36.6 °C)   Resp 18   Ht 5' 2\" (1.575 m)   Wt 85.7 kg (189 lb)   SpO2 91%   BMI 34.57 kg/m²      Temp (24hrs), Av.8 °F (36.6 °C), Min:97.8 °F (36.6 °C), Max:97.8 °F (36.6 °C)    Oxygen Therapy  O2 Sat (%): 91 % (09/16/19 1005)  Pulse via Oximetry: 90 beats per minute (09/16/19 1005)  O2 Device: Room air (09/16/19 1005)  Physical Exam:  General:    Alert, cooperative, no distress, appears stated age. Head:   Normocephalic, without obvious abnormality, atraumatic. Nose:  Nares normal. No drainage or sinus tenderness. Lungs:   Clear to auscultation bilaterally. No Wheezing or Rhonchi. No rales. Heart:   Regular rate and rhythm,  no murmur, rub or gallop. Abdomen:   Soft, non-tender. Not distended. Bowel sounds normal.   Extremities: No cyanosis. No edema. No clubbing  Skin:     Texture, turgor normal. No rashes or lesions. Not Jaundiced  Neurologic: GCS 15, no motor or sensory deficits, CN 2-12 intact  Psych:             AO x3, mood and affect appropriate  Data Review:   Recent Results (from the past 24 hour(s))   CBC WITH AUTOMATED DIFF    Collection Time: 09/16/19 11:29 AM   Result Value Ref Range    WBC 6.7 4.3 - 11.1 K/uL    RBC 3.69 (L) 4.05 - 5.2 M/uL    HGB 10.5 (L) 11.7 - 15.4 g/dL    HCT 34.1 (L) 35.8 - 46.3 %    MCV 92.4 79.6 - 97.8 FL    MCH 28.5 26.1 - 32.9 PG    MCHC 30.8 (L) 31.4 - 35.0 g/dL    RDW 16.0 (H) 11.9 - 14.6 %    PLATELET 897 309 - 139 K/uL    MPV 10.4 9.4 - 12.3 FL    ABSOLUTE NRBC 0.00 0.0 - 0.2 K/uL    DF AUTOMATED      NEUTROPHILS 70 43 - 78 %    LYMPHOCYTES 19 13 - 44 %    MONOCYTES 9 4.0 - 12.0 %    EOSINOPHILS 1 0.5 - 7.8 %    BASOPHILS 1 0.0 - 2.0 %    IMMATURE GRANULOCYTES 0 0.0 - 5.0 %    ABS. NEUTROPHILS 4.7 1.7 - 8.2 K/UL    ABS. LYMPHOCYTES 1.3 0.5 - 4.6 K/UL    ABS. MONOCYTES 0.6 0.1 - 1.3 K/UL    ABS. EOSINOPHILS 0.1 0.0 - 0.8 K/UL    ABS. BASOPHILS 0.1 0.0 - 0.2 K/UL    ABS. IMM.  GRANS. 0.0 0.0 - 0.5 K/UL   METABOLIC PANEL, COMPREHENSIVE    Collection Time: 09/16/19 11:29 AM   Result Value Ref Range    Sodium 137 136 - 145 mmol/L    Potassium 5.2 (H) 3.5 - 5.1 mmol/L    Chloride 101 98 - 107 mmol/L    CO2 28 21 - 32 mmol/L    Anion gap 8 7 - 16 mmol/L    Glucose 255 (H) 65 - 100 mg/dL    BUN 43 (H) 8 - 23 MG/DL    Creatinine 3.23 (H) 0.6 - 1.0 MG/DL    GFR est AA 18 (L) >60 ml/min/1.73m2    GFR est non-AA 15 (L) >60 ml/min/1.73m2    Calcium 9.3 8.3 - 10.4 MG/DL    Bilirubin, total 0.4 0.2 - 1.1 MG/DL    ALT (SGPT) 14 12 - 65 U/L    AST (SGOT) 17 15 - 37 U/L    Alk. phosphatase 219 (H) 50 - 136 U/L    Protein, total 7.8 6.3 - 8.2 g/dL    Albumin 3.3 3.2 - 4.6 g/dL    Globulin 4.5 (H) 2.3 - 3.5 g/dL    A-G Ratio 0.7 (L) 1.2 - 3.5     TYPE & SCREEN    Collection Time: 09/16/19 11:29 AM   Result Value Ref Range    Crossmatch Expiration 09/19/2019     ABO/Rh(D) B POSITIVE     Antibody screen NEG    PROCALCITONIN    Collection Time: 09/16/19 11:29 AM   Result Value Ref Range    Procalcitonin 0.1 ng/mL   POC LACTIC ACID    Collection Time: 09/16/19 11:40 AM   Result Value Ref Range    Lactic Acid (POC) 1.55 0.5 - 1.9 mmol/L     Imaging /Procedures /Studies   No diagnostic imaging for this admission. Assessment and Plan: Active Hospital Problems    Diagnosis Date Noted    Generalized weakness 09/16/2019    Decreased oral intake 09/16/2019    Acute renal failure (ARF) ([facility]) 08/08/2019       PLAN  · Admit for acute renal failure  · Possible differentials for ARF: drug induced interstitial nephritis vs ATN vs dehydration (volume depletion) vs ARB's  · Stopping Bactrim DS, colchicine and losartan  · F/u with urine lytes and eosinophils  · Start IV fluids, LR @ 125 cc/hr  · Place a coyle for accurate I&O's  · F/u with U/A and culture  · Monitor daily BMP, avoid nephrotoxic meds  · GI plans for stent removal on 9/17/19. NPO after midnight. Pt may not need any further antibiotic coverage.   · Nephro and ID consult  · Continue other home meds as reconciled in MAR  · POC glucose monitoring qachs, start lantus 30 units and humalog ssi  · PT/OT eval  · DVT prophylaxis with heparin    Code Status: full  High risk    Anticipated discharge: >2MN    Signed By: Hermelinda Barton MD     September 16, 2019

## 2019-09-16 NOTE — CONSULTS
[facility] Consult Note       Primary GI Physician: Dr. Suhail Walls    Referring Provider:  Dr. Mihir Martínez - ER MD    Consult Date:  9/16/2019    Admit Date:  9/16/2019    Chief Complaint:  Biliary stent    Subjective:     History of Present Illness:  Patient is a 78 y.o. female with PMH of CAD s/p CABG - on Plavix, DM 2, gout, HTN, HLD, peripheral neuropathy, ischemic colitis, diverticulosis, ampullary stenosis s/p biliary stent, who is seen in consultation at the request of Dr. Mihir Martínez for biliary stent. She was seen during admission in Aug 2019 for septic shock with ESBL E coli and enterococcus bacteremia, with abnormal LFTs and s/p recent ERCP. She underwent repeat ERCP with Dr. Krissy Porter on 10 Aug 2019 with biliary stent placed. She had a TTE over that admission and infective endocarditis was ruled out. She was sent to rehab on IV Vanc and Merrem for 3 weeks - EOT 6 Sept 2019, and then continuing Fosfomycin every 3 days until ERCP with stent removal.  She was scheduled for stent removal on 4 Oct 2019, but a telephone call 27 Aug 2019 in our chart mentioned pt's daughter called and procedure was cancelled due to pt being in rehab. She came home from rehab on Monday and has not been feeling well over the past week, having increasing weakness, decreased appetite. Her daughter came to pick her up for a doctor's appt today and states the weakness was more severe and her mother than had a black stool. She brought her to the ER due to concern of worsening symptoms. She states she has been on an iron supplement. She has not otherwise had a change in bowel habits, having a BM every 2-3 days, and denies any abd pain, nausea, vomiting, heartburn, chest pain, or difficulty swallowing. She has had coughing off and on. Labs in ER noted increase in hgb from 8.5 on 16 Aug 2019 to 10.5 today. LFTs are negative except alk phos 219. Cr and BUN are elevated at 3.23 and 43 respectively.       PMH:  Past Medical History:   Diagnosis Date    Acute cystitis without hematuria 1/30/2019    CAD (coronary artery disease)     DM2 (diabetes mellitus, type 2) (HCC)     Gout     HLD (hyperlipidemia)     HTN (hypertension)     Peripheral neuropathy      EUS 19 June 2019 (Dr. Ho Powell) with POST-OP DIAGNOSIS: Dilated bile duct and pancreatic duct without obstructing lesion visualized, Exam with mild limitations because of tenuous respiratory status. PLAN:  Consider either followup LFTs and imaging versus ERCP, Some causes of dilated bile duct are not visible on EUS, including ampullary stenosis or sphincter of Oddi dysfunction, Further followup plans with Dr. Shadi Metzger as outpatient. ERCP 24 July 2019 (Dr. Tristen Avalos) with Impression: Dilated CBD, Small stone (doubt this is the cause of the patient's abnormalities both radiologic and lab wise), Suspect ampullary stenosis, less likely SOD. Sphinterotomy performed. Plan:  1. Follow up in the office  2. Recheck labs at a later date  3. Indomethacin 100mg MO x 1    ERCP 10 Aug 2019 (Dr. Aryan Oconnell) with ASSESSMENT:  1. Possible Cholangitis 2. Ampullary stenosis and edema 3. Placement of 10 Fr biliary stent. PLAN:  1. Follow up with inpatient team 2. Follow LFTs  3. Continue antibiotic therapy, follow microbiology culture data  4. If she declines clinically, consider urinary source as she has history of ESBL E.coli in the urine 5. Repeat ERCP in 4-6 weeks for stent management     PSH:  Past Surgical History:   Procedure Laterality Date    CARDIAC SURG PROCEDURE UNLIST      stents x 3 2009    HX CHOLECYSTECTOMY      jennifer in 1964    HX CORONARY ARTERY BYPASS GRAFT      x3    HX TUBAL LIGATION         Allergies: Allergies   Allergen Reactions    Sulfa (Sulfonamide Antibiotics) Swelling       Home Medications:  Prior to Admission medications    Medication Sig Start Date End Date Taking?  Authorizing Provider   trimethoprim-sulfamethoxazole (BACTRIM DS) 160-800 mg per tablet Take 1 Tab by mouth two (2) times a day. Yes Other, MD Mireille   metoprolol tartrate (LOPRESSOR) 25 mg tablet Take 0.5 Tabs by mouth every twelve (12) hours for 30 days. 8/16/19 9/15/19  Mercedes Mercado MD   pantoprazole (PROTONIX) 40 mg tablet Take 1 Tab by mouth daily for 30 days. 8/16/19 9/15/19  Mercedes Mercado MD   clopidogrel (PLAVIX) 75 mg tab Take 75 mg by mouth. Provider, Historical   insulin glargine (LANTUS) 100 unit/mL injection 50 Units by SubCUTAneous route nightly. 2/6/19   Courtney Dewitt MD   insulin lispro (HUMALOG) 100 unit/mL injection Per SSI 2/6/19   Courtney Dewitt MD   Blood-Glucose Meter [facility] ULTRAMINI) monitoring kit Use as directed 4/14/15   Marlena Heart MD   glucose blood VI test strips [facility] ULTRA TEST) strip Test 4 times daily as directed 4/14/15   Marlena Heart MD   Lancets [facility] ULTRASOFT LANCETS) misc Test 4 times daily as directed 4/14/15   Marlena Heart MD   losartan (COZAAR) 100 mg tablet Take  by mouth daily. 1/12/15   Provider, Historical   Omeprazole delayed release (PRILOSEC D/R) 20 mg tablet Take 1 Tab by mouth daily. 3/9/15   Marlena Heart MD   rosuvastatin (CRESTOR) 20 mg tablet Take 1 Tab by mouth nightly. 3/9/15   Marlena Heart MD   gabapentin (NEURONTIN) 100 mg capsule Take 1 Cap by mouth three (3) times daily. 3/9/15   Marlena Heart MD   colchicine (COLCRYS) 0.6 mg tablet Take 0.6 mg by mouth three (3) times daily as needed. Provider, Historical   rOPINIRole (REQUIP) 0.5 mg tablet Take 1 Tab by mouth nightly as needed. 4/21/14   Marlena eHart MD       Hospital Medications:  Current Facility-Administered Medications   Medication Dose Route Frequency    0.9% sodium chloride infusion 500 mL  500 mL IntraVENous ONCE     Current Outpatient Medications   Medication Sig    trimethoprim-sulfamethoxazole (BACTRIM DS) 160-800 mg per tablet Take 1 Tab by mouth two (2) times a day.  clopidogrel (PLAVIX) 75 mg tab Take 75 mg by mouth.     insulin glargine (LANTUS) 100 unit/mL injection 50 Units by SubCUTAneous route nightly.  insulin lispro (HUMALOG) 100 unit/mL injection Per SSI    Blood-Glucose Meter (ONETOUCH ULTRAMINI) monitoring kit Use as directed    glucose blood VI test strips (ONETOUCH ULTRA TEST) strip Test 4 times daily as directed    Lancets (ONETOUCH ULTRASOFT LANCETS) misc Test 4 times daily as directed    losartan (COZAAR) 100 mg tablet Take  by mouth daily.  Omeprazole delayed release (PRILOSEC D/R) 20 mg tablet Take 1 Tab by mouth daily.  rosuvastatin (CRESTOR) 20 mg tablet Take 1 Tab by mouth nightly.  gabapentin (NEURONTIN) 100 mg capsule Take 1 Cap by mouth three (3) times daily.  colchicine (COLCRYS) 0.6 mg tablet Take 0.6 mg by mouth three (3) times daily as needed.  rOPINIRole (REQUIP) 0.5 mg tablet Take 1 Tab by mouth nightly as needed. Social History:  Social History     Tobacco Use    Smoking status: Never Smoker    Smokeless tobacco: Never Used   Substance Use Topics    Alcohol use: Yes     Comment: socially       Family History:  Family History   Problem Relation Age of Onset    Hypertension Mother     Cancer Mother     Diabetes Mother     Heart Disease Mother        Review of Systems:  A detailed 10 system ROS is obtained, with pertinent positives as listed above. All others are negative. Objective:     Physical Exam:  Vitals:  Visit Vitals  /66 (BP 1 Location: Right arm, BP Patient Position: At rest)   Pulse 90   Temp 97.8 °F (36.6 °C)   Resp 18   Ht 5' 2\" (1.575 m)   Wt 85.7 kg (189 lb)   SpO2 91%   BMI 34.57 kg/m²     Gen:  Pt is alert, cooperative, no acute distress, lying in bed, O2 NC in place  Skin:  Face reveal no rashes. HEENT: Sclerae anicteric. Extra-occular muscles are intact. No oral ulcers. No abnormal pigmentation of the lips. The neck is supple. Cardiovascular: Regular rate and rhythm. No murmurs, gallops, or rubs. Respiratory:  Comfortable breathing with no accessory muscle use. Clear breath sounds anteriorly with no wheezes, rales, or rhonchi. GI:  Abdomen nondistended, soft, and nontender. Normal active bowel sounds. No enlargement of the liver or spleen. No masses palpable. Rectal:  Deferred  Musculoskeletal:  No pitting edema of the lower legs. Erythema and venous stasis changes noted of LE bilaterally, as well as warmth of LE bilaterally. Neurological:  Gross memory appears intact. Patient is alert and oriented. Psychiatric:  Mood appears appropriate with judgement intact. Lymphatic:  No cervical or supraclavicular adenopathy. Laboratory:    Recent Labs     09/16/19  1129   WBC 6.7   HGB 10.5*   HCT 34.1*      MCV 92.4      K 5.2*      CO2 28   BUN 43*   CREA 3.23*   CA 9.3   *   *   SGOT 17   ALT 14   TBILI 0.4   ALB 3.3   TP 7.8      Ref. Range 9/16/2019 11:40   Lactic Acid (POC) Latest Ref Range: 0.5 - 1.9 mmol/L 1.55      Ref. Range 9/16/2019 11:29   Procalcitonin Latest Units: ng/mL 0.1     Assessment:     Active Problems:    * No active hospital problems. [de-identified]    77 yo female with PMH of CAD s/p CABG - on Plavix, DM 2, gout, HTN, HLD, peripheral neuropathy, ischemic colitis, diverticulosis, ampullary stenosis s/p biliary stent, who is seen in consultation 16 Sept 2019 at the request of Dr. Gena Simpson for abd pain, s/p biliary stent, who was seen during admission in Aug 2019 for septic shock with ESBL E coli and enterococcus bacteremia, with abnormal LFTs and s/p recent ERCP, and underwent repeat ERCP with Dr. Frank Gordon on 10 Aug 2019 with biliary stent placed. She was sent to rehab on IV Vanc and Merrem for 3 weeks - EOT 6 Sept 2019, and then continuing Fosfomycin every 3 days until ERCP with stent removal.  She has not been feeling well since home from rehab, having weakness and decreased appetite, and a black stool today (on iron supplement).   Labs in ER noted increase in hgb from 8.5 on 16 Aug 2019 to 10.5 today, LFTs are negative except alk phos 219, and Cr and BUN are elevated at 3.23 and 43 respectively. Stent appears to be functioning well. As hgb has increased, likely not having active GI bleeding. Plan:     -Supportive care. -NPO after MN.  -EGD with stent removal on 17 Sept 2019 with Dr. Goldie Reed if stable. -Follow. Patient is seen and examined in collaboration with Dr. Shannon Barker. Assessment and plan as per Dr. Goldie Rede. Philomena Norris PA-C  [facility]    I have seen and examined the patient, and I have directed and agreed to the plan as described. I do not think there is any clinical significance to the black stool. In fact, her hemoglobin has increased. The black stool may result from her iron supplement. There are no other GI symptoms. We will proceed to removal of the biliary stent tomorrow and exam for any source for melena. Assuming the exam is unremarkable, there are no further GI plans.     Shannon Barker MD

## 2019-09-16 NOTE — ED NOTES
Attempted IV placement twice in triage with no success. Blood work will need to be obtained in room.

## 2019-09-16 NOTE — ED TRIAGE NOTES
Patient ambulatory to triage with rollator and family. Patient states \"I don't feel good. \" POC glucose prior to arrive 255. Family reports that she has black stools. Reports that she has not been feeling good since Friday, describes it as \"worn out. \"

## 2019-09-16 NOTE — H&P (VIEW-ONLY)
[facility] Consult Note       Primary GI Physician: Dr. Humberto Ramos    Referring Provider:  Dr. Chaparro Harris - ER MD    Consult Date:  9/16/2019    Admit Date:  9/16/2019    Chief Complaint:  Biliary stent    Subjective:     History of Present Illness:  Patient is a 78 y.o. female with PMH of CAD s/p CABG - on Plavix, DM 2, gout, HTN, HLD, peripheral neuropathy, ischemic colitis, diverticulosis, ampullary stenosis s/p biliary stent, who is seen in consultation at the request of Dr. Chaparro Harris for biliary stent. She was seen during admission in Aug 2019 for septic shock with ESBL E coli and enterococcus bacteremia, with abnormal LFTs and s/p recent ERCP. She underwent repeat ERCP with Dr. Aung Hankins on 10 Aug 2019 with biliary stent placed. She had a TTE over that admission and infective endocarditis was ruled out. She was sent to rehab on IV Vanc and Merrem for 3 weeks - EOT 6 Sept 2019, and then continuing Fosfomycin every 3 days until ERCP with stent removal.  She was scheduled for stent removal on 4 Oct 2019, but a telephone call 27 Aug 2019 in our chart mentioned pt's daughter called and procedure was cancelled due to pt being in rehab. She came home from rehab on Monday and has not been feeling well over the past week, having increasing weakness, decreased appetite. Her daughter came to pick her up for a doctor's appt today and states the weakness was more severe and her mother than had a black stool. She brought her to the ER due to concern of worsening symptoms. She states she has been on an iron supplement. She has not otherwise had a change in bowel habits, having a BM every 2-3 days, and denies any abd pain, nausea, vomiting, heartburn, chest pain, or difficulty swallowing. She has had coughing off and on. Labs in ER noted increase in hgb from 8.5 on 16 Aug 2019 to 10.5 today. LFTs are negative except alk phos 219. Cr and BUN are elevated at 3.23 and 43 respectively.       PMH:  Past Medical History:   Diagnosis Date    Acute cystitis without hematuria 1/30/2019    CAD (coronary artery disease)     DM2 (diabetes mellitus, type 2) (HCC)     Gout     HLD (hyperlipidemia)     HTN (hypertension)     Peripheral neuropathy      EUS 19 June 2019 (Dr. Alana Layton) with POST-OP DIAGNOSIS: Dilated bile duct and pancreatic duct without obstructing lesion visualized, Exam with mild limitations because of tenuous respiratory status. PLAN:  Consider either followup LFTs and imaging versus ERCP, Some causes of dilated bile duct are not visible on EUS, including ampullary stenosis or sphincter of Oddi dysfunction, Further followup plans with Dr. Mina Palacio as outpatient. ERCP 24 July 2019 (Dr. Deondre Underwood) with Impression: Dilated CBD, Small stone (doubt this is the cause of the patient's abnormalities both radiologic and lab wise), Suspect ampullary stenosis, less likely SOD. Sphinterotomy performed. Plan:  1. Follow up in the office  2. Recheck labs at a later date  3. Indomethacin 100mg KS x 1    ERCP 10 Aug 2019 (Dr. Leighton Snowden) with ASSESSMENT:  1. Possible Cholangitis 2. Ampullary stenosis and edema 3. Placement of 10 Fr biliary stent. PLAN:  1. Follow up with inpatient team 2. Follow LFTs  3. Continue antibiotic therapy, follow microbiology culture data  4. If she declines clinically, consider urinary source as she has history of ESBL E.coli in the urine 5. Repeat ERCP in 4-6 weeks for stent management     PSH:  Past Surgical History:   Procedure Laterality Date    CARDIAC SURG PROCEDURE UNLIST      stents x 3 2009    HX CHOLECYSTECTOMY      jennifer in 1964    HX CORONARY ARTERY BYPASS GRAFT      x3    HX TUBAL LIGATION         Allergies: Allergies   Allergen Reactions    Sulfa (Sulfonamide Antibiotics) Swelling       Home Medications:  Prior to Admission medications    Medication Sig Start Date End Date Taking?  Authorizing Provider   trimethoprim-sulfamethoxazole (BACTRIM DS) 160-800 mg per tablet Take 1 Tab by mouth two (2) times a day. Yes Other, MD Mireille   metoprolol tartrate (LOPRESSOR) 25 mg tablet Take 0.5 Tabs by mouth every twelve (12) hours for 30 days. 8/16/19 9/15/19  Maria Luisa Frazier MD   pantoprazole (PROTONIX) 40 mg tablet Take 1 Tab by mouth daily for 30 days. 8/16/19 9/15/19  Maria Luisa Frazier MD   clopidogrel (PLAVIX) 75 mg tab Take 75 mg by mouth. Provider, Historical   insulin glargine (LANTUS) 100 unit/mL injection 50 Units by SubCUTAneous route nightly. 2/6/19   Luciano Dewitt MD   insulin lispro (HUMALOG) 100 unit/mL injection Per SSI 2/6/19   Luciano Dewitt MD   Blood-Glucose Meter [facility] ULTRAMINI) monitoring kit Use as directed 4/14/15   Maykel Clifton MD   glucose blood VI test strips [facility] ULTRA TEST) strip Test 4 times daily as directed 4/14/15   Maykel Clifton MD   Lancets [facility] ULTRASOFT LANCETS) misc Test 4 times daily as directed 4/14/15   Maykel Clifton MD   losartan (COZAAR) 100 mg tablet Take  by mouth daily. 1/12/15   Provider, Historical   Omeprazole delayed release (PRILOSEC D/R) 20 mg tablet Take 1 Tab by mouth daily. 3/9/15   Maykel Clifton MD   rosuvastatin (CRESTOR) 20 mg tablet Take 1 Tab by mouth nightly. 3/9/15   Maykel Clifton MD   gabapentin (NEURONTIN) 100 mg capsule Take 1 Cap by mouth three (3) times daily. 3/9/15   Maykel Clifton MD   colchicine (COLCRYS) 0.6 mg tablet Take 0.6 mg by mouth three (3) times daily as needed. Provider, Historical   rOPINIRole (REQUIP) 0.5 mg tablet Take 1 Tab by mouth nightly as needed. 4/21/14   Maykel Clifton MD       Hospital Medications:  Current Facility-Administered Medications   Medication Dose Route Frequency    0.9% sodium chloride infusion 500 mL  500 mL IntraVENous ONCE     Current Outpatient Medications   Medication Sig    trimethoprim-sulfamethoxazole (BACTRIM DS) 160-800 mg per tablet Take 1 Tab by mouth two (2) times a day.  clopidogrel (PLAVIX) 75 mg tab Take 75 mg by mouth.     insulin glargine (LANTUS) 100 unit/mL injection 50 Units by SubCUTAneous route nightly.  insulin lispro (HUMALOG) 100 unit/mL injection Per SSI    Blood-Glucose Meter (ONETOUCH ULTRAMINI) monitoring kit Use as directed    glucose blood VI test strips (ONETOUCH ULTRA TEST) strip Test 4 times daily as directed    Lancets (ONETOUCH ULTRASOFT LANCETS) misc Test 4 times daily as directed    losartan (COZAAR) 100 mg tablet Take  by mouth daily.  Omeprazole delayed release (PRILOSEC D/R) 20 mg tablet Take 1 Tab by mouth daily.  rosuvastatin (CRESTOR) 20 mg tablet Take 1 Tab by mouth nightly.  gabapentin (NEURONTIN) 100 mg capsule Take 1 Cap by mouth three (3) times daily.  colchicine (COLCRYS) 0.6 mg tablet Take 0.6 mg by mouth three (3) times daily as needed.  rOPINIRole (REQUIP) 0.5 mg tablet Take 1 Tab by mouth nightly as needed. Social History:  Social History     Tobacco Use    Smoking status: Never Smoker    Smokeless tobacco: Never Used   Substance Use Topics    Alcohol use: Yes     Comment: socially       Family History:  Family History   Problem Relation Age of Onset    Hypertension Mother     Cancer Mother     Diabetes Mother     Heart Disease Mother        Review of Systems:  A detailed 10 system ROS is obtained, with pertinent positives as listed above. All others are negative. Objective:     Physical Exam:  Vitals:  Visit Vitals  /66 (BP 1 Location: Right arm, BP Patient Position: At rest)   Pulse 90   Temp 97.8 °F (36.6 °C)   Resp 18   Ht 5' 2\" (1.575 m)   Wt 85.7 kg (189 lb)   SpO2 91%   BMI 34.57 kg/m²     Gen:  Pt is alert, cooperative, no acute distress, lying in bed, O2 NC in place  Skin:  Face reveal no rashes. HEENT: Sclerae anicteric. Extra-occular muscles are intact. No oral ulcers. No abnormal pigmentation of the lips. The neck is supple. Cardiovascular: Regular rate and rhythm. No murmurs, gallops, or rubs. Respiratory:  Comfortable breathing with no accessory muscle use. Clear breath sounds anteriorly with no wheezes, rales, or rhonchi. GI:  Abdomen nondistended, soft, and nontender. Normal active bowel sounds. No enlargement of the liver or spleen. No masses palpable. Rectal:  Deferred  Musculoskeletal:  No pitting edema of the lower legs. Erythema and venous stasis changes noted of LE bilaterally, as well as warmth of LE bilaterally. Neurological:  Gross memory appears intact. Patient is alert and oriented. Psychiatric:  Mood appears appropriate with judgement intact. Lymphatic:  No cervical or supraclavicular adenopathy. Laboratory:    Recent Labs     09/16/19  1129   WBC 6.7   HGB 10.5*   HCT 34.1*      MCV 92.4      K 5.2*      CO2 28   BUN 43*   CREA 3.23*   CA 9.3   *   *   SGOT 17   ALT 14   TBILI 0.4   ALB 3.3   TP 7.8      Ref. Range 9/16/2019 11:40   Lactic Acid (POC) Latest Ref Range: 0.5 - 1.9 mmol/L 1.55      Ref. Range 9/16/2019 11:29   Procalcitonin Latest Units: ng/mL 0.1     Assessment:     Active Problems:    * No active hospital problems. [de-identified]    77 yo female with PMH of CAD s/p CABG - on Plavix, DM 2, gout, HTN, HLD, peripheral neuropathy, ischemic colitis, diverticulosis, ampullary stenosis s/p biliary stent, who is seen in consultation 16 Sept 2019 at the request of Dr. Desi Thomas for abd pain, s/p biliary stent, who was seen during admission in Aug 2019 for septic shock with ESBL E coli and enterococcus bacteremia, with abnormal LFTs and s/p recent ERCP, and underwent repeat ERCP with Dr. Bibi Gallegos on 10 Aug 2019 with biliary stent placed. She was sent to rehab on IV Vanc and Merrem for 3 weeks - EOT 6 Sept 2019, and then continuing Fosfomycin every 3 days until ERCP with stent removal.  She has not been feeling well since home from rehab, having weakness and decreased appetite, and a black stool today (on iron supplement).   Labs in ER noted increase in hgb from 8.5 on 16 Aug 2019 to 10.5 today, LFTs are negative except alk phos 219, and Cr and BUN are elevated at 3.23 and 43 respectively. Stent appears to be functioning well. As hgb has increased, likely not having active GI bleeding. Plan:     -Supportive care. -NPO after MN.  -EGD with stent removal on 17 Sept 2019 with Dr. Reyna Caro if stable. -Follow. Patient is seen and examined in collaboration with Dr. Jamie Luke. Assessment and plan as per Dr. Reyna Caro. Philomena Fields PA-C  [facility]    I have seen and examined the patient, and I have directed and agreed to the plan as described. I do not think there is any clinical significance to the black stool. In fact, her hemoglobin has increased. The black stool may result from her iron supplement. There are no other GI symptoms. We will proceed to removal of the biliary stent tomorrow and exam for any source for melena. Assuming the exam is unremarkable, there are no further GI plans.     Jamie Luke MD

## 2019-09-16 NOTE — PROGRESS NOTES
09/16/19 2345   Dual Skin Pressure Injury Assessment   Dual Skin Pressure Injury Assessment WDL   Second Care Provider (Based on [address] [place] [address]) Srini Ulloa RN     Pt A&Ox4. Has no complaints at this time. No skin breakdown noted. Pt voided in collection hat and Urine sample sent to lab. Pt refuses coyle at this time. Instructed pt need for strict I&Os. Pt verbalizes understanding. Call light placed within reach. Bed low and locked.

## 2019-09-16 NOTE — CONSULTS
Infectious Disease Consult    Today's Date: 9/16/2019   Admit Date: 9/16/2019    Impression:   · PRATIMA and hyperkalemia, likely Bactrim related. · Recent Polymicrobial bacteremia: Enterococcus faecalis and ESBL E.coli (8/8), source likely biliary. TTE negative  · ESBL E.coli/Enterococcus faecium UTI, asymptomatic, UA benign, ?colonization  · Cholangitis/ampillary stenosis/noninfectious hepatitis s/p (8/10) ERCP with stent placement, recent ERCP (7/24) with small stone extraction  · CKD, baseline ~1.2    Plan:   ·  No further abx needed as bilary stent to be removed today. She remains clinically stable with improving creatinine, K+.   · **Will sign off at this time. Please call with questions or concerns. Anti-infectives:   · Vancomycin and Meropenem x 3 weeks, ending 9/6  · Bactrim and Amoxicillin (9/7-9/16)    Subjective:   Date of Consultation:  September 16, 2019  Referring Physician: Rosibel Cash     Patient is a 78 y.o. female known to ID, who was admitted on 9/16 for weakness, PRATIMA and hyperkalemia. She underwent an elective ERCP on 7/24/19 for elevated LFTs, with findings of a small stone. She reported feeling unwell since that procedure, progressing to 10/10 RLQ abdominal pain, nausea, vomiting. At last admission, WBC 32k, LA 3.2, PCT 9.5, Cr 3.35, lipase 2500, AST 2586, ALT 4996. She needed pressors, fluids and blood transfusion. BC with E.faecalis and ESBL E.coli, UA benign Ucx with ESBL E.coli and vanc-S E.faecium. CT abdomen and pelvis with possible SB enteritis, and chest bibasilar atelectasis, abdominal arterial duplex negative, seen by GI undergoing repeat ERCP on 8/10/10 for cholangitis, ampullary stenosis and had a stent placement. She was placed on a 3 weeks course of Vanc IV and Merrem IV, EOT 9/6/19 with plans to transition to Fosfomycin until stent isremoved.  She was unable to get Fosfomycin-requires prior auth that could take weeks (checked with Extension Nereida Melendez), so she was transitioned to Bactrim DS PO daily and Amoxicillin 500mg PO BID until stent could be removed. -For this admission, Cr 3.2 with K+ 5.2. GI was consulted for melena but she is on iron supplement and hemoglobin improved. Bilary stent to be removed. Remains afebrile without leukocytosis. UC/UA checked. No abx started.  Denies nausea, vomiting, diarrhea, fever, chills, or sweats      Patient Active Problem List   Diagnosis Code    Coronary artery disease involving coronary bypass graft of native heart without angina pectoris I25.810    Uncontrolled type 2 diabetes mellitus with hyperglycemia ([facility] [address] E11.65    Essential hypertension I10    HLD (hyperlipidemia) E78.5    Chronic kidney disease, stage III (moderate) ([facility]) N18.3    Osteopenia M85.80    Vitamin D deficiency E55.9    Gastroesophageal reflux disease without esophagitis K21.9    Other allergic rhinitis J30.89    Peripheral neuropathy G62.9    Primary osteoarthritis involving multiple joints M15.0    Insomnia G47.00    RLS (restless legs syndrome) G25.81    Acute renal failure (ARF) (HCC) N17.9    Elevated liver function tests R94.5    Anemia D64.9    Right lower quadrant abdominal pain R10.31    Gram-negative bacteremia R78.81    Acute pancreatitis K85.90    Generalized weakness R53.1    Decreased oral intake R63.8     Past Medical History:   Diagnosis Date    Acute cystitis without hematuria 1/30/2019    CAD (coronary artery disease)     DM2 (diabetes mellitus, type 2) (HCC)     Gout     HLD (hyperlipidemia)     HTN (hypertension)     Peripheral neuropathy       Family History   Problem Relation Age of Onset    Hypertension Mother     Cancer Mother     Diabetes Mother     Heart Disease Mother       Social History     Tobacco Use    Smoking status: Never Smoker    Smokeless tobacco: Never Used   Substance Use Topics    Alcohol use: Yes     Comment: socially     Past Surgical History:   Procedure Laterality Date    CARDIAC SURG PROCEDURE UNLIST stents x 3 2009    HX CHOLECYSTECTOMY      jennifer in 1964    HX CORONARY ARTERY BYPASS GRAFT      x3    HX TUBAL LIGATION        Prior to Admission medications    Medication Sig Start Date End Date Taking? Authorizing Provider   trimethoprim-sulfamethoxazole (BACTRIM DS) 160-800 mg per tablet Take 1 Tab by mouth two (2) times a day. Yes Other, MD Mireille   metoprolol tartrate (LOPRESSOR) 25 mg tablet Take 0.5 Tabs by mouth every twelve (12) hours for 30 days. 8/16/19 9/15/19  Jean Paul Morris MD   pantoprazole (PROTONIX) 40 mg tablet Take 1 Tab by mouth daily for 30 days. 8/16/19 9/15/19  Jean Paul Morris MD   clopidogrel (PLAVIX) 75 mg tab Take 75 mg by mouth. Provider, Historical   insulin glargine (LANTUS) 100 unit/mL injection 50 Units by SubCUTAneous route nightly. 2/6/19   Mesfin Dewitt MD   insulin lispro (HUMALOG) 100 unit/mL injection Per SSI 2/6/19   Mesfin Dewitt MD   Blood-Glucose Meter [facility] ULTRAMINI) monitoring kit Use as directed 4/14/15   Caden Humphrey MD   glucose blood VI test strips [facility] ULTRA TEST) strip Test 4 times daily as directed 4/14/15   Caden Humphrey MD   Lancets [facility] ULTRASOFT LANCETS) misc Test 4 times daily as directed 4/14/15   Caden Humphrey MD   losartan (COZAAR) 100 mg tablet Take  by mouth daily. 1/12/15   Provider, Historical   Omeprazole delayed release (PRILOSEC D/R) 20 mg tablet Take 1 Tab by mouth daily. 3/9/15   Caden Humphrey MD   rosuvastatin (CRESTOR) 20 mg tablet Take 1 Tab by mouth nightly. 3/9/15   Caden Humphrey MD   gabapentin (NEURONTIN) 100 mg capsule Take 1 Cap by mouth three (3) times daily. 3/9/15   Caden Humphrey MD   colchicine (COLCRYS) 0.6 mg tablet Take 0.6 mg by mouth three (3) times daily as needed. Provider, Historical   rOPINIRole (REQUIP) 0.5 mg tablet Take 1 Tab by mouth nightly as needed.  4/21/14   Caden Humphrey MD       Allergies   Allergen Reactions    Sulfa (Sulfonamide Antibiotics) Swelling        Review of Systems: A comprehensive review of systems was negative except for that written in the History of Present Illness. Objective:     Visit Vitals  /76   Pulse 83   Temp 97.8 °F (36.6 °C)   Resp 18   Ht 5' 2\" (1.575 m)   Wt 85.7 kg (189 lb)   SpO2 99%   BMI 34.57 kg/m²     Temp (24hrs), Av.8 °F (36.6 °C), Min:97.8 °F (36.6 °C), Max:97.8 °F (36.6 °C)       Lines:  Peripheral IV:       Physical Exam:    General:  Alert, cooperative, appears stated age   Eyes:  Sclera anicteric. Pupils equally round and reactive to light. Mouth/Throat: Mucous membranes normal, oral pharynx clear   Neck: Supple   Lungs:   Clear to auscultation bilaterally, good effort   CV:  Regular rate and rhythm,no murmur, click, rub or gallop   Abdomen:   Soft, non-tender.  bowel sounds normal. non-distended   Extremities: No cyanosis or edema   Skin: Skin color, texture, turgor normal. no acute rash or lesions   Lymph nodes: Cervical and supraclavicular normal   Musculoskeletal: No swelling or deformity   Lines/Devices:  Intact, no erythema, drainage or tenderness   Psych: Alert and oriented, normal mood affect given the setting       Data Review:     CBC:  Recent Labs     19  1129   WBC 6.7   GRANS 70   MONOS 9   EOS 1   ANEU 4.7   ABL 1.3   HGB 10.5*   HCT 34.1*          BMP:  Recent Labs     19  1129   CREA 3.23*   BUN 43*      K 5.2*      CO2 28   AGAP 8   *       LFTS:  Recent Labs     19  1129   TBILI 0.4   ALT 14   SGOT 17   *   TP 7.8   ALB 3.3       Microbiology:     All Micro Results     Procedure Component Value Units Date/Time    CULTURE, URINE [[id number]]     Order Status:  Sent Specimen:  Clean catch           Imaging:   n/a    Signed By: Jason Gaytan NP     2019

## 2019-09-17 ENCOUNTER — ANESTHESIA (OUTPATIENT)
Dept: ENDOSCOPY | Age: 79
End: 2019-09-17
Payer: MEDICARE

## 2019-09-17 PROBLEM — N17.9 ARF (ACUTE RENAL FAILURE) (HCC): Status: ACTIVE | Noted: 2019-09-17

## 2019-09-17 LAB
ANION GAP SERPL CALC-SCNC: 5 MMOL/L (ref 7–16)
BUN SERPL-MCNC: 33 MG/DL (ref 8–23)
CALCIUM SERPL-MCNC: 8.3 MG/DL (ref 8.3–10.4)
CHLORIDE SERPL-SCNC: 106 MMOL/L (ref 98–107)
CO2 SERPL-SCNC: 29 MMOL/L (ref 21–32)
CREAT SERPL-MCNC: 2.37 MG/DL (ref 0.6–1)
ERYTHROCYTE [DISTWIDTH] IN BLOOD BY AUTOMATED COUNT: 15.8 % (ref 11.9–14.6)
GLUCOSE BLD STRIP.AUTO-MCNC: 128 MG/DL (ref 65–100)
GLUCOSE BLD STRIP.AUTO-MCNC: 242 MG/DL (ref 65–100)
GLUCOSE BLD STRIP.AUTO-MCNC: 326 MG/DL (ref 65–100)
GLUCOSE SERPL-MCNC: 124 MG/DL (ref 65–100)
HCT VFR BLD AUTO: 29 % (ref 35.8–46.3)
HCT VFR BLD AUTO: 30 % (ref 35.8–46.3)
HGB BLD-MCNC: 8.8 G/DL (ref 11.7–15.4)
HGB BLD-MCNC: 9.1 G/DL (ref 11.7–15.4)
MCH RBC QN AUTO: 28.3 PG (ref 26.1–32.9)
MCHC RBC AUTO-ENTMCNC: 30.3 G/DL (ref 31.4–35)
MCV RBC AUTO: 93.2 FL (ref 79.6–97.8)
MM INDURATION POC: 0 MM (ref 0–5)
NRBC # BLD: 0 K/UL (ref 0–0.2)
PLATELET # BLD AUTO: 272 K/UL (ref 150–450)
PMV BLD AUTO: 10.2 FL (ref 9.4–12.3)
POTASSIUM SERPL-SCNC: 4.4 MMOL/L (ref 3.5–5.1)
PPD POC: NEGATIVE NEGATIVE
RBC # BLD AUTO: 3.11 M/UL (ref 4.05–5.2)
SODIUM SERPL-SCNC: 140 MMOL/L (ref 136–145)
WBC # BLD AUTO: 7.1 K/UL (ref 4.3–11.1)

## 2019-09-17 PROCEDURE — 77030021593 HC FCPS BIOP ENDOSC BSC -A: Performed by: INTERNAL MEDICINE

## 2019-09-17 PROCEDURE — 88312 SPECIAL STAINS GROUP 1: CPT

## 2019-09-17 PROCEDURE — 97161 PT EVAL LOW COMPLEX 20 MIN: CPT

## 2019-09-17 PROCEDURE — 97165 OT EVAL LOW COMPLEX 30 MIN: CPT

## 2019-09-17 PROCEDURE — 82962 GLUCOSE BLOOD TEST: CPT

## 2019-09-17 PROCEDURE — 80048 BASIC METABOLIC PNL TOTAL CA: CPT

## 2019-09-17 PROCEDURE — 85018 HEMOGLOBIN: CPT

## 2019-09-17 PROCEDURE — 76040000025: Performed by: INTERNAL MEDICINE

## 2019-09-17 PROCEDURE — 77030013991 HC SNR POLYP ENDOSC BSC -A: Performed by: INTERNAL MEDICINE

## 2019-09-17 PROCEDURE — 97535 SELF CARE MNGMENT TRAINING: CPT

## 2019-09-17 PROCEDURE — 74011250637 HC RX REV CODE- 250/637: Performed by: HOSPITALIST

## 2019-09-17 PROCEDURE — 76060000032 HC ANESTHESIA 0.5 TO 1 HR: Performed by: INTERNAL MEDICINE

## 2019-09-17 PROCEDURE — 74011636637 HC RX REV CODE- 636/637: Performed by: HOSPITALIST

## 2019-09-17 PROCEDURE — 85027 COMPLETE CBC AUTOMATED: CPT

## 2019-09-17 PROCEDURE — 74011000250 HC RX REV CODE- 250

## 2019-09-17 PROCEDURE — 88305 TISSUE EXAM BY PATHOLOGIST: CPT

## 2019-09-17 PROCEDURE — 36415 COLL VENOUS BLD VENIPUNCTURE: CPT

## 2019-09-17 PROCEDURE — 74011250636 HC RX REV CODE- 250/636

## 2019-09-17 PROCEDURE — 74011250636 HC RX REV CODE- 250/636: Performed by: HOSPITALIST

## 2019-09-17 PROCEDURE — 65390000012 HC CONDITION CODE 44 OBSERVATION

## 2019-09-17 PROCEDURE — 77030020255 HC SOL INJ LR 1000ML BG

## 2019-09-17 PROCEDURE — 99218 HC RM OBSERVATION: CPT

## 2019-09-17 PROCEDURE — 96372 THER/PROPH/DIAG INJ SC/IM: CPT

## 2019-09-17 RX ORDER — PROPOFOL 10 MG/ML
INJECTION, EMULSION INTRAVENOUS AS NEEDED
Status: DISCONTINUED | OUTPATIENT
Start: 2019-09-17 | End: 2019-09-17 | Stop reason: HOSPADM

## 2019-09-17 RX ORDER — FLUCONAZOLE 100 MG/1
150 TABLET ORAL DAILY
Status: COMPLETED | OUTPATIENT
Start: 2019-09-18 | End: 2019-09-20

## 2019-09-17 RX ORDER — LIDOCAINE HYDROCHLORIDE 20 MG/ML
INJECTION, SOLUTION EPIDURAL; INFILTRATION; INTRACAUDAL; PERINEURAL AS NEEDED
Status: DISCONTINUED | OUTPATIENT
Start: 2019-09-17 | End: 2019-09-17 | Stop reason: HOSPADM

## 2019-09-17 RX ORDER — PROPOFOL 10 MG/ML
INJECTION, EMULSION INTRAVENOUS
Status: DISCONTINUED | OUTPATIENT
Start: 2019-09-17 | End: 2019-09-17 | Stop reason: HOSPADM

## 2019-09-17 RX ADMIN — SODIUM CHLORIDE, SODIUM LACTATE, POTASSIUM CHLORIDE, AND CALCIUM CHLORIDE 125 ML/HR: 600; 310; 30; 20 INJECTION, SOLUTION INTRAVENOUS at 09:14

## 2019-09-17 RX ADMIN — PROPOFOL 10 MG: 10 INJECTION, EMULSION INTRAVENOUS at 11:43

## 2019-09-17 RX ADMIN — INSULIN LISPRO 8 UNITS: 100 INJECTION, SOLUTION INTRAVENOUS; SUBCUTANEOUS at 17:30

## 2019-09-17 RX ADMIN — Medication 10 ML: at 05:53

## 2019-09-17 RX ADMIN — PROPOFOL 10 MG: 10 INJECTION, EMULSION INTRAVENOUS at 11:39

## 2019-09-17 RX ADMIN — GABAPENTIN 100 MG: 100 CAPSULE ORAL at 09:19

## 2019-09-17 RX ADMIN — SENNOSIDES, DOCUSATE SODIUM 1 TABLET: 50; 8.6 TABLET, FILM COATED ORAL at 09:19

## 2019-09-17 RX ADMIN — GABAPENTIN 100 MG: 100 CAPSULE ORAL at 22:51

## 2019-09-17 RX ADMIN — Medication 10 ML: at 22:52

## 2019-09-17 RX ADMIN — ROSUVASTATIN CALCIUM 20 MG: 20 TABLET, COATED ORAL at 22:51

## 2019-09-17 RX ADMIN — PROPOFOL 100 MCG/KG/MIN: 10 INJECTION, EMULSION INTRAVENOUS at 11:46

## 2019-09-17 RX ADMIN — SODIUM CHLORIDE, SODIUM LACTATE, POTASSIUM CHLORIDE, AND CALCIUM CHLORIDE 125 ML/HR: 600; 310; 30; 20 INJECTION, SOLUTION INTRAVENOUS at 22:58

## 2019-09-17 RX ADMIN — HEPARIN SODIUM 5000 UNITS: 5000 INJECTION INTRAVENOUS; SUBCUTANEOUS at 22:50

## 2019-09-17 RX ADMIN — METOPROLOL TARTRATE 12.5 MG: 25 TABLET ORAL at 09:18

## 2019-09-17 RX ADMIN — PROPOFOL 10 MG: 10 INJECTION, EMULSION INTRAVENOUS at 11:41

## 2019-09-17 RX ADMIN — METOPROLOL TARTRATE 12.5 MG: 25 TABLET ORAL at 22:51

## 2019-09-17 RX ADMIN — PROPOFOL 40 MG: 10 INJECTION, EMULSION INTRAVENOUS at 11:35

## 2019-09-17 RX ADMIN — GABAPENTIN 100 MG: 100 CAPSULE ORAL at 16:46

## 2019-09-17 RX ADMIN — Medication 10 ML: at 13:24

## 2019-09-17 RX ADMIN — INSULIN GLARGINE 30 UNITS: 100 INJECTION, SOLUTION SUBCUTANEOUS at 22:51

## 2019-09-17 RX ADMIN — LIDOCAINE HYDROCHLORIDE 100 MG: 20 INJECTION, SOLUTION EPIDURAL; INFILTRATION; INTRACAUDAL; PERINEURAL at 11:35

## 2019-09-17 RX ADMIN — PANTOPRAZOLE SODIUM 40 MG: 40 TABLET, DELAYED RELEASE ORAL at 16:45

## 2019-09-17 RX ADMIN — INSULIN LISPRO 4 UNITS: 100 INJECTION, SOLUTION INTRAVENOUS; SUBCUTANEOUS at 22:52

## 2019-09-17 RX ADMIN — PROPOFOL 10 MG: 10 INJECTION, EMULSION INTRAVENOUS at 11:45

## 2019-09-17 RX ADMIN — PROPOFOL 10 MG: 10 INJECTION, EMULSION INTRAVENOUS at 11:46

## 2019-09-17 RX ADMIN — PROPOFOL 10 MG: 10 INJECTION, EMULSION INTRAVENOUS at 11:37

## 2019-09-17 NOTE — PROGRESS NOTES
[facility] Progress Note    Follow-Up on: PRATIMA    ROS:  Gen - no fever, no chills, appetite unchanged  CV - no chest pain, no palpitation  Lung - no shortness of breath, no cough  Abd - no tenderness, no nausea/vomiting, no diarrhea  Ext - no edema    Exam:  Vitals:    09/16/19 1930 09/16/19 2340 09/17/19 0446 09/17/19 0714   BP: 123/69 158/80 111/66 126/67   Pulse: 98 91 79 77   Resp: 18 18 18 18   Temp: 98 °F (36.7 °C) 97.7 °F (36.5 °C) 98 °F (36.7 °C) 98.3 °F (36.8 °C)   SpO2: 93% 93% 97% 92%   Weight:       Height:             Intake/Output Summary (Last 24 hours) at 9/17/2019 1045  Last data filed at 9/17/2019 2635  Gross per 24 hour   Intake 3134 ml   Output 900 ml   Net 2234 ml       Wt Readings from Last 3 Encounters:   09/16/19 85.7 kg (189 lb)   08/16/19 89.4 kg (197 lb 3.2 oz)   07/24/19 81.6 kg (180 lb)       GEN - in no distress  CV - regular, no murmur, no rub  Lung - clear bilaterally  Abd - soft, nontender  Ext - no edema    Recent Labs     09/17/19  0615 09/16/19  1129   WBC 7.1 6.7   HGB 8.8* 10.5*   HCT 29.0* 34.1*    322        Recent Labs     09/17/19  0615 09/16/19  1129    137   K 4.4 5.2*    101   CO2 29 28   BUN 33* 43*   CREA 2.37* 3.23*   CA 8.3 9.3   * 255*       Assessment / Plan: Active Problems:    Essential hypertension (6/15/2015)      HLD (hyperlipidemia) (6/15/2015)      Gastroesophageal reflux disease without esophagitis (6/15/2015)      RLS (restless legs syndrome) (6/15/2015)      Acute renal failure (ARF) ([facility] [address] (8/8/2019)      Anemia (8/8/2019)      Generalized weakness (9/16/2019)      Decreased oral intake (9/16/2019)      1. PRATIMA - baseline Cr low 1's  - Admission Cr 3.23, currently 2.37 with IVF  - Likely volume depletion, some lab effects due to Bactrim as well  2.  Recent cholangitis - due to have biliary stent removed today

## 2019-09-17 NOTE — PROGRESS NOTES
[facility] Progress Note         Admit Date:  9/16/2019    Today's Date:  9/17/2019    CC:  Abd pain, biliary stent    Subjective:     Patient is feeling better today and denies any abd pain, nausea, or vomiting. She is not feeling as weak. She has not had BMs or bleeding since admission. She is awaiting EGD today. Medications:   Current Facility-Administered Medications   Medication Dose Route Frequency    clopidogrel (PLAVIX) tablet 75 mg  75 mg Oral DAILY    gabapentin (NEURONTIN) capsule 100 mg  100 mg Oral TID    metoprolol tartrate (LOPRESSOR) tablet 12.5 mg  12.5 mg Oral Q12H    pantoprazole (PROTONIX) tablet 40 mg  40 mg Oral ACB&D    rOPINIRole (REQUIP) tablet 0.5 mg  0.5 mg Oral QHS PRN    rosuvastatin (CRESTOR) tablet 20 mg  20 mg Oral QHS    sodium chloride (NS) flush 5-40 mL  5-40 mL IntraVENous Q8H    sodium chloride (NS) flush 5-40 mL  5-40 mL IntraVENous PRN    tuberculin injection 5 Units  5 Units IntraDERMal ONCE    acetaminophen (TYLENOL) tablet 650 mg  650 mg Oral Q6H PRN    oxyCODONE-acetaminophen (PERCOCET) 5-325 mg per tablet 1 Tab  1 Tab Oral Q6H PRN    ondansetron (ZOFRAN) injection 4 mg  4 mg IntraVENous Q4H PRN    senna-docusate (PERICOLACE) 8.6-50 mg per tablet 1 Tab  1 Tab Oral DAILY    heparin (porcine) injection 5,000 Units  5,000 Units SubCUTAneous Q12H    insulin lispro (HUMALOG) injection   SubCUTAneous AC&HS    insulin glargine (LANTUS) injection 30 Units  30 Units SubCUTAneous QHS    lactated Ringers infusion  125 mL/hr IntraVENous CONTINUOUS       Review of Systems:  ROS was obtained, with pertinent positives as listed above. No chest pain or SOB.     Diet:  NPO    Objective:   Vitals:  Visit Vitals  /67 (BP 1 Location: Left arm, BP Patient Position: Head of bed elevated (Comment degrees)) Comment (BP Patient Position): 45   Pulse 77   Temp 98.3 °F (36.8 °C)   Resp 18   Ht 5' 2\" (1.575 m)   Wt 85.7 kg (189 lb)   SpO2 92%   BMI 34.57 kg/m²     Intake/Output:  No intake/output data recorded. 09/15 1901 - 09/17 0700  In: 3134 [P.O.:360; I.V.:2774]  Out: 900 [Urine:900]  Exam:  General appearance: alert, cooperative, no distress, lying in bed, O2 NC in place  Lungs: clear to auscultation bilaterally anteriorly  Heart: regular rate and rhythm  Abdomen: soft, non-tender. Bowel sounds normal. No masses, no organomegaly  Neuro:  alert and oriented    Data Review (Labs):    Recent Labs     09/17/19  0615 09/16/19  1129   WBC 7.1 6.7   HGB 8.8* 10.5*   HCT 29.0* 34.1*    322   MCV 93.2 92.4    137   K 4.4 5.2*    101   CO2 29 28   BUN 33* 43*   CREA 2.37* 3.23*   CA 8.3 9.3   * 255*   AP  --  219*   SGOT  --  17   ALT  --  14   TBILI  --  0.4   ALB  --  3.3   TP  --  7.8       Ref. Range 9/16/2019 11:40   Lactic Acid (POC) Latest Ref Range: 0.5 - 1.9 mmol/L 1.55        Ref. Range 9/16/2019 11:29   Procalcitonin Latest Units: ng/mL 0.1      Ref. Range 9/16/2019 13:23   Occult blood, stool (POC) Latest Ref Range: NEG   NEGATIVE     Assessment:     Active Problems:    Essential hypertension (6/15/2015)      HLD (hyperlipidemia) (6/15/2015)      Gastroesophageal reflux disease without esophagitis (6/15/2015)      RLS (restless legs syndrome) (6/15/2015)      Acute renal failure (ARF) ([facility] [address] (8/8/2019)      Anemia (8/8/2019)      Generalized weakness (9/16/2019)      Decreased oral intake (9/16/2019)    77 yo female patient of Dr. Kranthi Pratt with PMH of CAD s/p CABG - on Plavix, DM 2, gout, HTN, HLD, peripheral neuropathy, ischemic colitis, diverticulosis, ampullary stenosis s/p biliary stent, who was seen in consultation 16 Sept 2019 at the request of Dr. Sonya Harris for abd pain, s/p biliary stent, who was seen during admission in Aug 2019 for septic shock with ESBL E coli and enterococcus bacteremia, with abnormal LFTs and s/p recent ERCP, and underwent repeat ERCP with Dr. Donna Cain on 10 Aug 2019 with biliary stent placed.   She was sent to rehab on IV Vanc and Merrem for 3 weeks - EOT 6 Sept 2019, and then continuing Fosfomycin every 3 days until ERCP with stent removal.  She has not been feeling well since home from rehab, having weakness and decreased appetite, and a black stool today (on iron supplement). Labs in ER noted increase in hgb from 8.5 on 16 Aug 2019 to 10.5 today, LFTs are negative except alk phos 219, and Cr and BUN are elevated at 3.23 and 43 respectively. Stent appears to be functioning well. Hgb has decreased, but no BMs or active bleeding. The drop is likely related to dehydration at presentation and IV hydration. Plan:     -Supportive care. -EGD with stent removal pending today with Dr. Mariah North.  -NPO. -Follow. Patient is seen and examined in collaboration with Dr. Oswaldo Sandifer. Assessment and plan as per Dr. Mariah North. Philomena Krause [place]  [facility]

## 2019-09-17 NOTE — INTERVAL H&P NOTE
H&P Update:  Grace Wong was seen and examined. History and physical has been reviewed. The patient has been examined. There have been no significant clinical changes since the completion of the originally dated History and Physical.  Patient identified by surgeon; surgical site was confirmed by patient and surgeon.

## 2019-09-17 NOTE — PROGRESS NOTES
TRANSFER - IN REPORT:    Verbal report [facility] from [facility]  being received from ***(unit) for {TRANSFER CARE:28756}      Report consisted of patients Situation, Background, Assessment and   Recommendations(SBAR). Information from the following report(s) {SBAR REPORTS NQDZ:28548} was reviewed with the receiving nurse. Opportunity for questions and clarification was provided. Assessment completed upon patients arrival to unit and care assumed.

## 2019-09-17 NOTE — ANESTHESIA POSTPROCEDURE EVALUATION
Procedure(s):  ESOPHAGOGASTRODUODENOSCOPY (EGD) STENT REPLACEMENT/ 35/ ROOM 610.    total IV anesthesia    Anesthesia Post Evaluation      Multimodal analgesia: multimodal analgesia used between 6 hours prior to anesthesia start to PACU discharge  Patient location during evaluation: bedside  Patient participation: complete - patient participated  Level of consciousness: awake and responsive to light touch  Pain management: adequate  Airway patency: patent  Anesthetic complications: no  Cardiovascular status: acceptable, hemodynamically stable, blood pressure returned to baseline and stable  Respiratory status: acceptable, unassisted, spontaneous ventilation and nonlabored ventilation  Hydration status: acceptable  Post anesthesia nausea and vomiting:  controlled      Vitals Value Taken Time   /72 9/17/2019 12:36 PM   Temp 37 °C (98.6 °F) 9/17/2019 12:04 PM   Pulse 74 9/17/2019 12:39 PM   Resp 17 9/17/2019 12:36 PM   SpO2 99 % 9/17/2019 12:39 PM   Vitals shown include unvalidated device data.

## 2019-09-17 NOTE — PROGRESS NOTES
A/O x4. Up to bathroom and voiding yellow, clear. UOP measured. NPO since 0000 per order. Consent form signed and in chart per day shift RN. Refuse PPD until clarification given for reason since she had one placed on admission here in August 2019. Hourly rounds performed. All needs meet. Bed low/locked. Call light within reach. Patient denies needs at this time.   Will continue to monitor and report to oncoming RN

## 2019-09-17 NOTE — PROGRESS NOTES
Hospitalist Progress Note     Admit Date:  2019 10:55 AM   Name:  Elly Zapata   Age:  78 y.o.  :  1940   MRN:  [id number]   PCP:  Zan Phipps MD  Treatment Team: Attending Provider: Jaleesa Lee MD; Consulting Provider: HERMINIO Alejandro; Consulting Provider: Mariel Hernandez MD; Utilization Review: Rajinder Rivas RN; Physical Therapist: Eduardo Tenorio DPT; Occupational Therapist: Merlin Cons, OT; Care Manager: Dinorah Stephens RN    Subjective:   HPI and or CC:  Patient is an 78years old female with hx of CABG, HTN, HLD, CKD-1, s/p cholecystectomy, GERD, ESBL E.coli and Enterococcus bacteremia following ERCP on  for which she completed IV Merrem and Vancomycin on 19 presented with generalized weakness and poor oral intake for past 3-4 days. Pt was discharged home recently from [facility], pt was fine for couple of days but then started feeling exhausted, progressively declining overall, feels lethargic and fatigued with minimal exertion. Pt reports poor oral intake. Pt denies any abdominal pain, nausea/vomiting, fever, chills, diarrhea, headache, urinary symptoms. Pt was recently started on Bactrim DS by ID. Pt was also started on oral iron recently and since then pt had noticed some black colored stool. ER work up showed Cr 3.23 (baseline 1.04), K 5.2.    : Patient returned from GI lab, biliary stent successfully removed. Per EGD report, also considered gastritis as potential bleed source with instructions to hold Plavix for 1 week, no NSAIDS, and continue PPI therapy. Hemoglobin down to 8.8 prior to EGD, will continue to monitor. Improvement with creatinine today: 2.37. Continues with IVF, discharge planning per CM will be [facility] when stable. Nephrology continues to follow, GI has signed off and will recheck in 2 weeks outpatient. Normal WBC, afebrile. Alert and oriented x3. Urine culture NGTD.       Objective:     Patient Vitals for the past 24 hrs:   Temp Pulse Resp BP SpO2   09/17/19 1247  75 18 141/72 99 %   09/17/19 1236  75 17 138/72 98 %   09/17/19 1226  75 16 134/68 98 %   09/17/19 1217  78 17 136/63 96 %   09/17/19 1207  81 16 106/55 97 %   09/17/19 1204 98.6 °F (37 °C) 77 15 127/61 100 %   09/17/19 1159  73 17 (!) 81/45 100 %   09/17/19 1116 98.2 °F (36.8 °C)       09/17/19 1114  83 18 114/84 93 %   09/17/19 0714 98.3 °F (36.8 °C) 77 18 126/67 92 %   09/17/19 0446 98 °F (36.7 °C) 79 18 111/66 97 %   09/16/19 2340 97.7 °F (36.5 °C) 91 18 158/80 93 %   09/16/19 1930 98 °F (36.7 °C) 98 18 123/69 93 %   09/16/19 1648 97.9 °F (36.6 °C) 83 17 146/80 95 %     Oxygen Therapy  O2 Sat (%): 99 % (09/17/19 1247)  Pulse via Oximetry: 75 beats per minute (09/17/19 1247)  O2 Device: Nasal cannula (09/17/19 1247)  O2 Flow Rate (L/min): 2 l/min (09/17/19 1247)    Intake/Output Summary (Last 24 hours) at 9/17/2019 1441  Last data filed at 9/17/2019 1202  Gross per 24 hour   Intake 2334 ml   Output 900 ml   Net 1434 ml         REVIEW OF SYSTEMS: Comprehensive ROS performed and negative except as stated in HPI. Physical Examination:  General:          Alert, cooperative, no distress, appears stated age. Head:               Normocephalic, without obvious abnormality, atraumatic. Nose:               Nares normal. No drainage or sinus tenderness. Lungs:             Clear to auscultation bilaterally. No Wheezing or Rhonchi. No rales. Heart:              Regular rate and rhythm,  no murmur, rub or gallop. Abdomen:        Soft, non-tender. Not distended. Bowel sounds normal.   Extremities:     No cyanosis. No edema. No clubbing  Skin:                Texture, turgor normal. No rashes or lesions.   Not Jaundiced  Neurologic:      GCS 15, no motor or sensory deficits, CN 2-12 intact  Psych:             AO x3, mood and affect appropriate    Data Review:  I have reviewed all labs, meds, telemetry events, and studies from the last 24 hours. Recent Results (from the past 24 hour(s))   CHLORIDE, URINE RANDOM    Collection Time: 09/16/19  3:26 PM   Result Value Ref Range    Chloride,urine random 89 MMOL/L   POTASSIUM, UR, RANDOM    Collection Time: 09/16/19  3:26 PM   Result Value Ref Range    Potassium urine, random 26 MMOL/L   SODIUM, UR, RANDOM    Collection Time: 09/16/19  3:26 PM   Result Value Ref Range    Sodium,urine random 95 MMOL/L   EOSINOPHILS, URINE    Collection Time: 09/16/19  3:26 PM   Result Value Ref Range    Eosinophils,urine NEGATIVE  NEG     URINALYSIS W/ RFLX MICROSCOPIC    Collection Time: 09/16/19  3:26 PM   Result Value Ref Range    Color YELLOW      Appearance CLEAR      Specific gravity 1.011 1.001 - 1.023      pH (UA) 6.5 5.0 - 9.0      Protein NEGATIVE  NEG mg/dL    Glucose NEGATIVE  mg/dL    Ketone NEGATIVE  NEG mg/dL    Bilirubin NEGATIVE  NEG      Blood NEGATIVE  NEG      Urobilinogen 0.2 0.2 - 1.0 EU/dL    Nitrites NEGATIVE  NEG      Leukocyte Esterase NEGATIVE  NEG     CULTURE, URINE    Collection Time: 09/16/19  3:26 PM   Result Value Ref Range    Special Requests: NO SPECIAL REQUESTS      Culture result: NO GROWTH 1 DAY     PROTEIN/CREATININE RATIO, URINE    Collection Time: 09/16/19  3:26 PM   Result Value Ref Range    Protein, urine random 13 (H) <11.9 mg/dL    Creatinine, urine 82.60 mg/dL    Protein/Creat.  urine Ratio 0.2     GLUCOSE, POC    Collection Time: 09/16/19  4:44 PM   Result Value Ref Range    Glucose (POC) 185 (H) 65 - 100 mg/dL   GLUCOSE, POC    Collection Time: 09/16/19  8:36 PM   Result Value Ref Range    Glucose (POC) 230 (H) 65 - 782 mg/dL   METABOLIC PANEL, BASIC    Collection Time: 09/17/19  6:15 AM   Result Value Ref Range    Sodium 140 136 - 145 mmol/L    Potassium 4.4 3.5 - 5.1 mmol/L    Chloride 106 98 - 107 mmol/L    CO2 29 21 - 32 mmol/L    Anion gap 5 (L) 7 - 16 mmol/L    Glucose 124 (H) 65 - 100 mg/dL    BUN 33 (H) 8 - 23 MG/DL    Creatinine 2.37 (H) 0.6 - 1.0 MG/DL    GFR est AA 25 (L) >60 ml/min/1.73m2    GFR est non-AA 21 (L) >60 ml/min/1.73m2    Calcium 8.3 8.3 - 10.4 MG/DL   CBC W/O DIFF    Collection Time: 09/17/19  6:15 AM   Result Value Ref Range    WBC 7.1 4.3 - 11.1 K/uL    RBC 3.11 (L) 4.05 - 5.2 M/uL    HGB 8.8 (L) 11.7 - 15.4 g/dL    HCT 29.0 (L) 35.8 - 46.3 %    MCV 93.2 79.6 - 97.8 FL    MCH 28.3 26.1 - 32.9 PG    MCHC 30.3 (L) 31.4 - 35.0 g/dL    RDW 15.8 (H) 11.9 - 14.6 %    PLATELET 469 492 - 840 K/uL    MPV 10.2 9.4 - 12.3 FL    ABSOLUTE NRBC 0.00 0.0 - 0.2 K/uL   GLUCOSE, POC    Collection Time: 09/17/19  7:10 AM   Result Value Ref Range    Glucose (POC) 128 (H) 65 - 100 mg/dL        All Micro Results     Procedure Component Value Units Date/Time    CULTURE, URINE [[id number]] Collected:  09/16/19 1526    Order Status:  Completed Specimen:  Clean catch Updated:  09/17/19 0737     Special Requests: NO SPECIAL REQUESTS        Culture result: NO GROWTH 1 DAY             Current Meds:  Current Facility-Administered Medications   Medication Dose Route Frequency    gabapentin (NEURONTIN) capsule 100 mg  100 mg Oral TID    metoprolol tartrate (LOPRESSOR) tablet 12.5 mg  12.5 mg Oral Q12H    pantoprazole (PROTONIX) tablet 40 mg  40 mg Oral ACB&D    rOPINIRole (REQUIP) tablet 0.5 mg  0.5 mg Oral QHS PRN    rosuvastatin (CRESTOR) tablet 20 mg  20 mg Oral QHS    sodium chloride (NS) flush 5-40 mL  5-40 mL IntraVENous Q8H    sodium chloride (NS) flush 5-40 mL  5-40 mL IntraVENous PRN    tuberculin injection 5 Units  5 Units IntraDERMal ONCE    acetaminophen (TYLENOL) tablet 650 mg  650 mg Oral Q6H PRN    oxyCODONE-acetaminophen (PERCOCET) 5-325 mg per tablet 1 Tab  1 Tab Oral Q6H PRN    ondansetron (ZOFRAN) injection 4 mg  4 mg IntraVENous Q4H PRN    senna-docusate (PERICOLACE) 8.6-50 mg per tablet 1 Tab  1 Tab Oral DAILY    heparin (porcine) injection 5,000 Units  5,000 Units SubCUTAneous Q12H    insulin lispro (HUMALOG) injection   SubCUTAneous AC&HS    insulin glargine (LANTUS) injection 30 Units  30 Units SubCUTAneous QHS    lactated Ringers infusion  125 mL/hr IntraVENous CONTINUOUS       Diet:  DIET DIABETIC CONSISTENT CARB    Other Studies (last 24 hours):  Us Retroperitoneum Comp    Result Date: 9/16/2019  RENAL ULTRASOUND 9/16/2019 6:13 PM HISTORY: Acute kidney injury; acute renal failure; generalized weakness with decreased oral intake; PRATIMA TECHNIQUE: Sonographic imaging of the kidneys, bladder and retroperitoneal vessels was performed. COMPARISON: CT abdomen and pelvis August 8, 2019 FINDINGS: The right kidney is 9.7 cm in length. The left kidney is 9.6 cm in length. There is no hydronephrosis. There are no visible shadowing renal calculi. The bladder is normal in appearance. The IVC is patent. The abdominal aorta is 1.8 cm in diameter. IMPRESSION:  No hydronephrosis or acute renal findings.       Assessment and Plan:     Hospital Problems as of 9/17/2019 Date Reviewed: 8/10/2019          Codes Class Noted - Resolved POA    * (Principal) ARF (acute renal failure) ([facility]) ICD-10-CM: N17.9  ICD-9-CM: 584.9  9/17/2019 - Present Unknown        Generalized weakness ICD-10-CM: R53.1  ICD-9-CM: 780.79  9/16/2019 - Present Unknown        Decreased oral intake ICD-10-CM: R63.8  ICD-9-CM: 783.9  9/16/2019 - Present Unknown        Acute renal failure (ARF) ([facility]) ICD-10-CM: N17.9  ICD-9-CM: 584.9  8/8/2019 - Present Unknown        Anemia ICD-10-CM: D64.9  ICD-9-CM: 285.9  8/8/2019 - Present Yes        Essential hypertension (Chronic) ICD-10-CM: I10  ICD-9-CM: 401.9  6/15/2015 - Present Yes        HLD (hyperlipidemia) (Chronic) ICD-10-CM: E78.5  ICD-9-CM: 272.4  6/15/2015 - Present Yes        Gastroesophageal reflux disease without esophagitis (Chronic) ICD-10-CM: K21.9  ICD-9-CM: 530.81  6/15/2015 - Present Yes        RLS (restless legs syndrome) (Chronic) ICD-10-CM: G25.81  ICD-9-CM: 333.94  6/15/2015 - Present Yes              A/P:    · Admit for acute renal failure  · Possible differentials for ARF: drug induced interstitial nephritis vs ATN vs dehydration (volume depletion) vs ARB's  · Stopping Bactrim DS, colchicine and losartan  · F/u with urine lytes and eosinophils  · Start IV fluids, LR @ 125 cc/hr  · Place a coyle for accurate I&O's  · F/u with U/A and culture  · Monitor daily BMP, avoid nephrotoxic meds  · GI  stent removal on 9/17/19. · Nephro and ID consult  · Continue other home meds as reconciled in MAR  · POC glucose monitoring qachs, start lantus 30 units and humalog ssi  · PT/OT eval  · DVT prophylaxis with heparin  · H&H Q8H. CBC in am.      **Per GI note, hold Plavix for 1 week. Continue with Protonix oral. No NSAIDS.     Signed:  SHAHEEN Hampton

## 2019-09-17 NOTE — PROGRESS NOTES
Patient refused PPD tonight. States she was given one during her last admission here August 2019. Would like to know why she would need another one so soon.

## 2019-09-17 NOTE — PROGRESS NOTES
Problem: Mobility Impaired (Adult and Pediatric)  Goal: *Acute Goals and Plan of Care (Insert Text)  Description  LTG:  (1.)Ms. Stephan Mclean will move from supine to sit and sit to supine, scoot up and down and roll side to side INDEPENDENTLY with bed flat within 7 treatment day(s). (2.)Ms. Stephan Mclean will transfer from bed to chair and chair to bed INDEPENDENTLY within 7 treatment day(s). (3.)Ms. Stephan Mclean will ambulate with MODIFIED INDEPENDENCE for 400 feet with the least restrictive device within 7 treatment day(s). (4.)Ms. Stephan Mclean will demonstrate intact static and dynamic balance during all ambulation and functional activities in standing using least restrictive device within 7 days. (5.)Ms. Stephan Mclean will perform exercises per HEP independently for 10+ minutes to improve strength and mobility within 7 days. ________________________________________________________________________________________________   Outcome: Progressing Towards Goal     PHYSICAL THERAPY: Initial Assessment and AM 9/17/2019  INPATIENT:    Payor: [address] [place] [address],9D / Plan: [address] / Product Type: [employer] /       NAME/AGE/GENDER: Ann Blas is a 78 y.o. female   PRIMARY DIAGNOSIS: Acute renal failure (ARF) ([facility] [address] [N17.9]  Generalized weakness [R53.1]  Decreased oral intake [R63.8] <principal problem not specified>   <principal problem not specified>    Procedure(s) (LRB):  ESOPHAGOGASTRODUODENOSCOPY (EGD) STENT REPLACEMENT/ 35/ ROOM 610 (N/A)  Day of Surgery  ICD-10: Treatment Diagnosis:    · Generalized Muscle Weakness (M62.81)  · Difficulty in walking, Not elsewhere classified (R26.2)  · Other abnormalities of gait and mobility (R26.89)   Precaution/Allergies:  Sulfa (sulfonamide antibiotics)      ASSESSMENT:     Ms. Stephan Mclean is an 78year old female admitted from home for acute renal failure, weakness. Planned for EGD today with stent replacement.  She lives alone and is independent to [employer] at baseline with walker. Family assists as needed and reports that recently she has been having more difficulty using upper extremities for functional activities and has been experiencing difficulty with ADLs due to limited vision. Needing increased assistance. Recently discharged home from rehab. Pt presents sitting up in chair and is agreeable to therapy assessment; has just finished OT. Performs sit-stand transfer with SBA. Ambulates in room and hallway using rolling walker and with CGA/SBA for safety. Takes one standing rest break during activity due to general fatigue and upper body fatigue. Pt returned to room and stretcher present to take pt for EGD. Requires min assist to transfer to stretcher and left with transport taking for procedure. Jaylyn Lane appears to be functioning below baseline with strength, balance, and activity tolerance and will benefit from continued therapy during hospital stay to address deficits/maximize independence with mobility. Dc needs TBD pending progress; possibly back to [facility] for rehab. This section established at most recent assessment   PROBLEM LIST (Impairments causing functional limitations):  1. Decreased Strength  2. Decreased Transfer Abilities  3. Decreased Ambulation Ability/Technique  4. Decreased Balance  5. Decreased Activity Tolerance   INTERVENTIONS PLANNED: (Benefits and precautions of physical therapy have been discussed with the patient.)  1. Balance Exercise  2. Bed Mobility  3. Gait Training  4. Home Exercise Program (HEP)  5. Therapeutic Activites  6. Therapeutic Exercise/Strengthening  7. Transfer Training     TREATMENT PLAN: Frequency/Duration: 3 times a week for duration of hospital stay  Rehabilitation Potential For Stated Goals: Good     REHAB RECOMMENDATIONS (at time of discharge pending progress):    Placement: It is my opinion, based on this patient's performance to date, that Ms. Carol Almonte may benefit from participating in 1-2 additional therapy sessions in order to continue to assess for rehab potential and then make recommendation for disposition at discharge. Rehab vs HH PT.  Equipment:    TBD pending progress               HISTORY:   History of Present Injury/Illness (Reason for Referral):  Per H&P, \"Patient is an 78years old female with hx of CABG, HTN, HLD, CKD-1, s/p cholecystectomy, GERD, ESBL E.coli and Enterococcus bacteremia following ERCP on 7/24 for which she completed IV Merrem and Vancomycin on 9/6/19 presented with generalized weakness and poor oral intake for past 3-4 days. Pt was discharged home recently from [facility], pt was fine for couple of days but then started feeling exhausted, progressively declining overall, feels lethargic and fatigued with minimal exertion. Pt reports poor oral intake. Pt denies any abdominal pain, nausea/vomiting, fever, chills, diarrhea, headache, urinary symptoms. Pt was recently started on Bactrim DS by ID. Pt was also started on oral iron recently and since then pt had noticed some black colored stool. ER work up showed Cr 3.23 (baseline 1.04), K 5.2\"    Past Medical History/Comorbidities:   Ms. Aston Carmona  has a past medical history of Acute cystitis without hematuria (1/30/2019), CAD (coronary artery disease), DM2 (diabetes mellitus, type 2) ([facility] [address] Gout, HLD (hyperlipidemia), HTN (hypertension), and Peripheral neuropathy. Ms. Aston Carmona  has a past surgical history that includes hx tubal ligation; hx coronary artery bypass graft; pr cardiac surg procedure unlist; and hx cholecystectomy. Social History/Living Environment:   Home Environment: Apartment  One/Two Story Residence: One story  Living Alone: Yes  Support Systems: Child(marga), Family member(s)  Patient Expects to be Discharged to[de-identified] Unknown  Current DME Used/Available at Home: Walker, rolling, Shower chair  Tub or Shower Type: Tub/Shower combination  Prior Level of Function/Work/Activity:  Lives alone.  Modified independent with rollator. Number of Personal Factors/Comorbidities that affect the Plan of Care: 1-2: MODERATE COMPLEXITY   EXAMINATION:   Most Recent Physical Functioning:   Gross Assessment:  AROM: Within functional limits  Strength: Generally decreased, functional  Coordination: Within functional limits               Posture:  Posture (WDL): Exceptions to WDL  Posture Assessment: Forward head, Rounded shoulders  Balance:  Sitting: Intact  Standing: Impaired  Standing - Static: Good  Standing - Dynamic : Fair(+ ) Bed Mobility:     Wheelchair Mobility:     Transfers:  Sit to Stand: Stand-by assistance  Stand to Sit: Stand-by assistance  Bed to Chair: Stand-by assistance  Gait:     Base of Support: Center of gravity altered  Speed/Noemi: Fluctuations  Step Length: Left shortened;Right shortened  Gait Abnormalities: Trunk sway increased  Distance (ft): 125 Feet (ft)(x2 with short standing rest break)  Assistive Device: Walker, rolling  Ambulation - Level of Assistance: Contact guard assistance;Stand-by assistance  Interventions: Verbal cues; Safety awareness training      Body Structures Involved:  1. Muscles Body Functions Affected:  1. Movement Related Activities and Participation Affected:  1. General Tasks and Demands  2. Mobility  3. Domestic Life  4. Community, Social and [place]   Number of elements that affect the Plan of Care: 4+: HIGH COMPLEXITY   CLINICAL PRESENTATION:   Presentation: Stable and uncomplicated: LOW COMPLEXITY   CLINICAL DECISION MAKIN [facility] Inpatient Short Form  How much difficulty does the patient currently have. .. Unable A Lot A Little None   1. Turning over in bed (including adjusting bedclothes, sheets and blankets)? ? 1   ? 2   ? 3   ? 4   2. Sitting down on and standing up from a chair with arms ( e.g., wheelchair, bedside commode, etc.)   ? 1   ? 2   ? 3   ? 4   3. Moving from lying on back to sitting on the side of the bed?    ? 1 ? 2   ? 3   ? 4   How much help from another person does the patient currently need. .. Total A Lot A Little None   4. Moving to and from a bed to a chair (including a wheelchair)? ? 1   ? 2   ? 3   ? 4   5. Need to walk in hospital room? ? 1   ? 2   ? 3   ? 4   6. Climbing 3-5 steps with a railing? ? 1   ? 2   ? 3   ? 4   © 2007, Trustees of [address] [place] [address] [address] [id number], under license to [employer]. All rights reserved      Score:  Initial: 21 Most Recent: X (Date: -- )    Interpretation of Tool:  Represents activities that are increasingly more difficult (i.e. Bed mobility, Transfers, Gait). Medical Necessity:     · Patient demonstrates good  ·  rehab potential due to higher previous functional level. Reason for Services/Other Comments:  · Patient continues to demonstrate capacity to improve strength, balance, activity tolerance which will increase independence and increase safety  · . Use of outcome tool(s) and clinical judgement create a POC that gives a: Clear prediction of patient's progress: LOW COMPLEXITY            TREATMENT:   (In addition to Assessment/Re-Assessment sessions the following treatments were rendered)   Pre-treatment Symptoms/Complaints:  \"I do pretty good\"  Pain: Initial:      Post Session:  0/10     Assessment/Reassessment only, no treatment provided today    Braces/Orthotics/Lines/Etc:   · none   Treatment/Session Assessment:    · Response to Treatment:  pt performs mobility with CGA/SBA using walker for ambulation  · Interdisciplinary Collaboration:   o Physical Therapist  o Registered Nurse  · After treatment position/precautions:   o Pt left with transport taking down for procedure    · Compliance with Program/Exercises: Will assess as treatment progresses  · Recommendations/Intent for next treatment session: \"Next visit will focus on advancements to more challenging activities and reduction in assistance provided\".   Total Treatment Duration:  PT Patient Time In/Time Out  Time In: 1048  Time Out: Katey 80, DPT

## 2019-09-17 NOTE — PROGRESS NOTES
TRANSFER - OUT REPORT:    Verbal report given to [facility] RN on [place]  being transferred to [facility](unit) for routine post - op       Report consisted of patients Situation, Background, Assessment and   Recommendations(SBAR). Information from the following report(s) SBAR, Procedure Summary and MAR was reviewed with the receiving nurse. Lines:   PICC Single Lumen 01/11/29 Right;Basilic (Active)       Peripheral IV 09/16/19 Left Forearm (Active)   Site Assessment Clean, dry, & intact 9/17/2019 11:16 AM   Phlebitis Assessment 0 9/17/2019 11:16 AM   Infiltration Assessment 0 9/17/2019 11:16 AM   Dressing Status Clean, dry, & intact 9/17/2019 11:16 AM   Dressing Type Transparent 9/17/2019 11:16 AM   Hub Color/Line Status Blue; Infusing 9/17/2019  7:40 AM   Action Taken Blood drawn 9/16/2019  2:08 PM   Alcohol Cap Used No 9/16/2019  2:08 PM        Opportunity for questions and clarification was provided.

## 2019-09-17 NOTE — ANESTHESIA PREPROCEDURE EVALUATION
Anesthetic History   No history of anesthetic complications            Review of Systems / Medical History  Patient summary reviewed and pertinent labs reviewed    Pulmonary  Within defined limits                 Neuro/Psych   Within defined limits        Pertinent negatives: No CVA  Comments: Neuropathy  Encephalopathy- A & O x 3.  Cardiovascular    Hypertension          CAD, cardiac stents and CABG  Pertinent negatives: No angina  Exercise tolerance: <4 METS  Comments: Essentially normal echo 2017 and 8/2019      88% on RA   GI/Hepatic/Renal         Renal disease: CRI and ARF       Endo/Other    Diabetes: poorly controlled, using insulin    Obesity, arthritis and anemia     Other Findings            Physical Exam    Airway  Mallampati: II  TM Distance: 4 - 6 cm  Neck ROM: normal range of motion   Mouth opening: Normal     Cardiovascular  Regular rate and rhythm,  S1 and S2 normal,  no murmur, click, rub, or gallop  Rhythm: regular        Pertinent negatives: No murmur and peripheral edema   Dental  No notable dental hx       Pulmonary      Decreased breath sounds: bibasilar           Abdominal  GI exam deferred       Other Findings            Anesthetic Plan    ASA: 3  Anesthesia type: total IV anesthesia          Induction: Intravenous  Anesthetic plan and risks discussed with: Patient

## 2019-09-17 NOTE — PROGRESS NOTES
Patient was recently at [facility]. At this time, [employer] liaison has spoke with family and they would like for patient to return. [facility] can accept patient back if therapy recommends SNF. CM will continue to follow.

## 2019-09-17 NOTE — PROGRESS NOTES
Problem: Risk for Spread of Infection  Goal: Prevent transmission of infectious organism to others  Description  Prevent the transmission of infectious organisms to other patients, staff members, and visitors. Outcome: Progressing Towards Goal     Problem: Falls - Risk of  Goal: *Absence of Falls  Description  Document Cherylle Mail Fall Risk and appropriate interventions in the flowsheet.   Outcome: Progressing Towards Goal  Note:   Fall Risk Interventions:            Medication Interventions: Patient to call before getting OOB, Teach patient to arise slowly                   Problem: Patient Education: Go to Patient Education Activity  Goal: Patient/Family Education  Outcome: Progressing Towards Goal

## 2019-09-17 NOTE — PROGRESS NOTES
Problem: Self Care Deficits Care Plan (Adult)  Goal: *Acute Goals and Plan of Care (Insert Text)  Description  1. Patient will complete lower body bathing and dressing with supervision and adaptive equipment as needed. 2. Patient will complete toileting with supervision. 3. Patient will tolerate 30 minutes of OT treatment with 2-3 rest breaks to increase activity tolerance for ADLs. 4. Patient will complete functional transfers with supervision and adaptive equipment as needed. 5. Patient will complete functional mobility for household distances with supervision and good safety awareness. Timeframe: 7 visits      Outcome: Progressing Towards Goal     OCCUPATIONAL THERAPY: Initial Assessment, Daily Note and AM 9/17/2019  INPATIENT: OT Visit Days: 1  Payor: Cristobal Hassan / Plan: [address] / Product Type: [employer] Medicare /      NAME/AGE/GENDER: Lennox Bourgeois is a 78 y.o. female   PRIMARY DIAGNOSIS:  Acute renal failure ([facility]F) ([facility] [N17.9]  Generalized weakness [R53.1]  Decreased oral intake [R63.8] <principal problem not specified>   <principal problem not specified>    Procedure(s) (LRB):  ESOPHAGOGASTRODUODENOSCOPY (EGD) STENT REPLACEMENT/ 35/ ROOM 610 (N/A)  Day of Surgery  ICD-10: Treatment Diagnosis:    · Generalized Muscle Weakness (M62.81)  · Other lack of cordination (R27.8)  · History of falling (Z91.81)   Precautions/Allergies:     Sulfa (sulfonamide antibiotics)      ASSESSMENT:     Ms. Noel Miller was admitted with generalized weakness/ARF. Pt lives alone in an apartment at [facility]" with a tub/shower and needs occasional assistance from a caregiver with ADL at baseline. Pt reports she uses a rollator for functional mobility and admits to one fall. Pt's caregiver helps her in/out of shower, with cooking, household chores, and driving her out into the community. Pt has been cooking but family states she has not been safe with this.  This session, pt presented sitting up in the chair working on bathing. Pt is alert, pleasant, and cooperative with therapy. Pt demonstrated deficits in strength, activity tolerance, safety awareness, and balance impacting ADLs. Pt completed perineal bathing in standing position with minimal assistance. Pt does need to hold to the walker to steady due to some decreased balance. Pt also needs cues to reach back with sitting vs flopping down into the chair. Pt donned pull-up with minimal verbal/tactile cues and some additional time. Pt is wearing O2 but only 1 L. Pt tolerated removing O2 and completing some functional mobility in the room on room air with use of a walker and SBA/CGA. Pt also stood in the bathroom and brushed her teeth with pt requiring some additional time and assistance to open toothpaste. Pt educated on backing up to the chair but did tend to leave the walker behind. Pt's O2 levels at 88-89% after activity on room air with pt recovering fairly quickly and nasal cannula back in place. At the end of the session, pt was left up in the chair with needs in reach. Daughter at bedside is asking about rehab at discharge. Pt presented below functional baseline and would benefit from skilled OT services to address deficits. This section established at most recent assessment   PROBLEM LIST (Impairments causing functional limitations):  1. Decreased Strength  2. Decreased ADL/Functional Activities  3. Decreased Transfer Abilities  4. Decreased Ambulation Ability/Technique  5. Decreased Balance  6. Decreased Activity Tolerance  7. Increased Shortness of Breath  8. Decreased Cloud with Home Exercise Program   INTERVENTIONS PLANNED: (Benefits and precautions of occupational therapy have been discussed with the patient.)  1. Activities of daily living training  2. Adaptive equipment training  3. Balance training  4. Clothing management  5. Neuromuscular re-eduation  6. Therapeutic activity  7.  Therapeutic exercise     TREATMENT PLAN: Frequency/Duration: Follow patient 3 times per week to address above goals. Rehabilitation Potential For Stated Goals: Excellent     REHAB RECOMMENDATIONS (at time of discharge pending progress):    Placement: It is my opinion, based on this patient's performance to date, that Ms. Teresa Whitt may benefit from intensive therapy at a [address] [place] [address] after discharge due to the functional deficits listed above that are likely to improve with skilled rehabilitation and concerns that he/she may be unsafe to be unsupervised at home due to risk for falls and functional decline vs OT services . Equipment:    TBD               OCCUPATIONAL PROFILE AND HISTORY:   History of Present Injury/Illness (Reason for Referral):  See H&P  Past Medical History/Comorbidities:   Ms. Teresa Whitt  has a past medical history of Acute cystitis without hematuria (1/30/2019), CAD (coronary artery disease), DM2 (diabetes mellitus, type 2) ([facility] Gout, HLD (hyperlipidemia), HTN (hypertension), and Peripheral neuropathy. Ms. Teresa Whitt  has a past surgical history that includes hx tubal ligation; hx coronary artery bypass graft; pr cardiac surg procedure unlist; and hx cholecystectomy. Social History/Living Environment:   Home Environment: Apartment  One/Two Story Residence: One story  Living Alone: Yes  Support Systems: Child(marga), Family member(s)  Patient Expects to be Discharged to[de-identified] Unknown  Current DME Used/Available at Home: Walker, rolling, Shower chair  Tub or Shower Type: Tub/Shower combination  Prior Level of Function/Work/Activity:  Pt lives alone in an apartment at [facility]" with a tub/shower and needs occasional assistance from a caregiver with ADL at baseline. Pt reports she uses a rollator for functional mobility and admits to one fall. Pt's caregiver helps her in/out of shower, with cooking, household chores, and driving her out into the community.  Pt has been cooking but family states she has not been safe with this. Personal Factors:          Age:  78 y.o. Other factors that influence how disability is experienced by the patient:  multiple co-morbidities     Number of Personal Factors/Comorbidities that affect the Plan of Care: Expanded review of therapy/medical records (1-2):  MODERATE COMPLEXITY   ASSESSMENT OF OCCUPATIONAL PERFORMANCE[de-identified]   Activities of Daily Living:   Basic ADLs (From Assessment) Complex ADLs (From Assessment)   Feeding: Stand-by assistance  Oral Facial Hygiene/Grooming: Stand-by assistance  Bathing: Minimum assistance  Upper Body Dressing: Stand-by assistance  Lower Body Dressing: Minimum assistance  Toileting: Minimum assistance Instrumental ADL  Meal Preparation: Maximum assistance  Homemaking: Maximum assistance   Grooming/Bathing/Dressing Activities of Daily Living   Grooming  Brushing Teeth: Stand-by assistance           Lower Body Bathing  Bathing Assistance: Minimum assistance  Perineal  : Minimum assistance  Position Performed: Standing  Cues: Tactile cues provided;Verbal cues provided;Visual cues provided       Functional Transfers  Bathroom Mobility: Contact guard assistance   Lower Body Dressing Assistance  Protective Undergarmet: Contact guard assistance Bed/Mat Mobility  Sit to Stand: Stand-by assistance  Stand to Sit: Stand-by assistance  Bed to Chair: Contact guard assistance  Scooting: Supervision     Most Recent Physical Functioning:   Gross Assessment:  AROM: Generally decreased, functional  Strength: Generally decreased, functional  Coordination: Generally decreased, functional               Posture:  Posture (WDL): Exceptions to WDL  Posture Assessment: Forward head, Rounded shoulders  Balance:  Sitting: Intact  Standing: Impaired; With support  Standing - Static: Good  Standing - Dynamic : Fair Bed Mobility:  Scooting: Supervision  Wheelchair Mobility:     Transfers:  Sit to Stand: Stand-by assistance  Stand to Sit: Stand-by assistance  Bed to Chair: Contact guard assistance            Patient Vitals for the past 6 hrs:   BP BP Patient Position SpO2 O2 Flow Rate (L/min) Pulse   19 0714 126/67 Head of bed elevated (Comment degrees) 92 %  77   19 1114 114/84  93 %  83   19 1159 (!) 81/45  100 % 2 l/min 73   19 1204 127/61  100 %  77   19 1207 106/55  97 % 2 l/min 81   19 1217 136/63  96 % 2 l/min 78   19 1226 134/68  98 % 2 l/min 75   19 1236 138/72  98 % 1 l/min 75   19 1247 141/72  99 % 2 l/min 75       Mental Status  Neurologic State: Alert                          Physical Skills Involved:  1. Balance  2. Strength  3. Activity Tolerance  4. Vision Cognitive Skills Affected (resulting in the inability to perform in a timely and safe manner):  1. Executive Function Psychosocial Skills Affected:  1. Habits/Routines  2. Self-Awareness   Number of elements that affect the Plan of Care: 5+:  HIGH COMPLEXITY   CLINICAL DECISION MAKIN[address] [place] [id number] AM-PAC 6 Clicks   Daily Activity Inpatient Short Form  How much help from another person does the patient currently need. .. Total A Lot A Little None   1. Putting on and taking off regular lower body clothing? ? 1   ? 2   ? 3   ? 4   2. Bathing (including washing, rinsing, drying)? ? 1   ? 2   ? 3   ? 4   3. Toileting, which includes using toilet, bedpan or urinal?   ? 1   ? 2   ? 3   ? 4   4. Putting on and taking off regular upper body clothing? ? 1   ? 2   ? 3   ? 4   5. Taking care of personal grooming such as brushing teeth? ? 1   ? 2   ? 3   ? 4   6. Eating meals? ? 1   ? 2   ? 3   ? 4   © , Trustees of [address] [place] [id number], under license to [employer]. All rights reserved      Score:  Initial: 18 Most Recent: X (Date: -- )    Interpretation of Tool:  Represents activities that are increasingly more difficult (i.e. Bed mobility, Transfers, Gait).     Medical Necessity:     · Patient demonstrates excellent  ·  rehab potential due to higher previous functional level. Reason for Services/Other Comments:  · Patient continues to require skilled intervention due to decreased independence with ADL/functional transfers. · .   Use of outcome tool(s) and clinical judgement create a POC that gives a: LOW COMPLEXITY         TREATMENT:   (In addition to Assessment/Re-Assessment sessions the following treatments were rendered)     Pre-treatment Symptoms/Complaints:    Pain: Initial:   Pain Intensity 1: 0  Post Session:  0/10     Self Care: (23 minutes): Procedure(s) (per grid) utilized to improve and/or restore self-care/home management as related to dressing, bathing and grooming. Required minimal visual, verbal, manual and tactile cueing to facilitate activities of daily living skills and compensatory activities. Braces/Orthotics/Lines/Etc:   · O2 Device: Nasal cannula  Treatment/Session Assessment:    · Response to Treatment:  Pt tolerated with excellent participation. · Interdisciplinary Collaboration:   o Occupational Therapist  o Registered Nurse  · After treatment position/precautions:   o Up in chair  o Bed/Chair-wheels locked  o Call light within reach  o RN notified  o Family at bedside   · Compliance with Program/Exercises: Will assess as treatment progresses. · Recommendations/Intent for next treatment session: \"Next visit will focus on advancements to more challenging activities and reduction in assistance provided\".   Total Treatment Duration:  OT Patient Time In/Time Out  Time In: 0958  Time Out: Mk Olivas OT

## 2019-09-17 NOTE — PROGRESS NOTES
Utilization Review Physician has recommended this patient is most appropriate as Outpatient Status receiving Observation Services. The Attending provider agreed and an outpatient order was placed on the chart as the Attending Provider requested. The patient will be provided the documentation for a Condition Code 44, conversion from Inpatient to Outpatient Status, and questions answered. The MOON letter explaining the outpatient/observation services was given to patient with verbal explanation and verbal understanding was received about information given. Letter signed by patient with date and time. Signed copy placed in the patient's chart for scanning by HIS and copy left at bedside for patient information.  UR FAY Chawla notified

## 2019-09-18 LAB
ANION GAP SERPL CALC-SCNC: 5 MMOL/L (ref 7–16)
BACTERIA SPEC CULT: NORMAL
BUN SERPL-MCNC: 24 MG/DL (ref 8–23)
CALCIUM SERPL-MCNC: 8.1 MG/DL (ref 8.3–10.4)
CHLORIDE SERPL-SCNC: 105 MMOL/L (ref 98–107)
CO2 SERPL-SCNC: 27 MMOL/L (ref 21–32)
CREAT SERPL-MCNC: 1.82 MG/DL (ref 0.6–1)
ERYTHROCYTE [DISTWIDTH] IN BLOOD BY AUTOMATED COUNT: 15.5 % (ref 11.9–14.6)
GLUCOSE BLD STRIP.AUTO-MCNC: 165 MG/DL (ref 65–100)
GLUCOSE BLD STRIP.AUTO-MCNC: 233 MG/DL (ref 65–100)
GLUCOSE BLD STRIP.AUTO-MCNC: 394 MG/DL (ref 65–100)
GLUCOSE BLD STRIP.AUTO-MCNC: 399 MG/DL (ref 65–100)
GLUCOSE SERPL-MCNC: 340 MG/DL (ref 65–100)
HCT VFR BLD AUTO: 27.2 % (ref 35.8–46.3)
HCT VFR BLD AUTO: 31.2 % (ref 35.8–46.3)
HGB BLD-MCNC: 8.2 G/DL (ref 11.7–15.4)
HGB BLD-MCNC: 9.5 G/DL (ref 11.7–15.4)
MCH RBC QN AUTO: 27.8 PG (ref 26.1–32.9)
MCHC RBC AUTO-ENTMCNC: 30.1 G/DL (ref 31.4–35)
MCV RBC AUTO: 92.2 FL (ref 79.6–97.8)
NRBC # BLD: 0 K/UL (ref 0–0.2)
PLATELET # BLD AUTO: 236 K/UL (ref 150–450)
PMV BLD AUTO: 10.4 FL (ref 9.4–12.3)
POTASSIUM SERPL-SCNC: 4.5 MMOL/L (ref 3.5–5.1)
RBC # BLD AUTO: 2.95 M/UL (ref 4.05–5.2)
SERVICE CMNT-IMP: NORMAL
SODIUM SERPL-SCNC: 137 MMOL/L (ref 136–145)
WBC # BLD AUTO: 7.1 K/UL (ref 4.3–11.1)

## 2019-09-18 PROCEDURE — 74011636637 HC RX REV CODE- 636/637: Performed by: HOSPITALIST

## 2019-09-18 PROCEDURE — 99218 HC RM OBSERVATION: CPT

## 2019-09-18 PROCEDURE — 74011250637 HC RX REV CODE- 250/637: Performed by: NURSE PRACTITIONER

## 2019-09-18 PROCEDURE — 96372 THER/PROPH/DIAG INJ SC/IM: CPT

## 2019-09-18 PROCEDURE — 82962 GLUCOSE BLOOD TEST: CPT

## 2019-09-18 PROCEDURE — 74011250636 HC RX REV CODE- 250/636: Performed by: HOSPITALIST

## 2019-09-18 PROCEDURE — 85018 HEMOGLOBIN: CPT

## 2019-09-18 PROCEDURE — 97530 THERAPEUTIC ACTIVITIES: CPT

## 2019-09-18 PROCEDURE — 36415 COLL VENOUS BLD VENIPUNCTURE: CPT

## 2019-09-18 PROCEDURE — 85027 COMPLETE CBC AUTOMATED: CPT

## 2019-09-18 PROCEDURE — 97110 THERAPEUTIC EXERCISES: CPT

## 2019-09-18 PROCEDURE — 80048 BASIC METABOLIC PNL TOTAL CA: CPT

## 2019-09-18 PROCEDURE — 77030020255 HC SOL INJ LR 1000ML BG

## 2019-09-18 PROCEDURE — 74011250637 HC RX REV CODE- 250/637: Performed by: HOSPITALIST

## 2019-09-18 PROCEDURE — 74011636637 HC RX REV CODE- 636/637: Performed by: NURSE PRACTITIONER

## 2019-09-18 RX ORDER — INSULIN LISPRO 100 [IU]/ML
5 INJECTION, SOLUTION INTRAVENOUS; SUBCUTANEOUS
Status: DISCONTINUED | OUTPATIENT
Start: 2019-09-18 | End: 2019-09-18

## 2019-09-18 RX ORDER — INSULIN GLARGINE 100 [IU]/ML
35 INJECTION, SOLUTION SUBCUTANEOUS
Status: DISCONTINUED | OUTPATIENT
Start: 2019-09-18 | End: 2019-09-18

## 2019-09-18 RX ORDER — INSULIN LISPRO 100 [IU]/ML
10 INJECTION, SOLUTION INTRAVENOUS; SUBCUTANEOUS
Status: DISCONTINUED | OUTPATIENT
Start: 2019-09-18 | End: 2019-09-19

## 2019-09-18 RX ORDER — INSULIN GLARGINE 100 [IU]/ML
50 INJECTION, SOLUTION SUBCUTANEOUS
Status: DISCONTINUED | OUTPATIENT
Start: 2019-09-18 | End: 2019-09-19

## 2019-09-18 RX ORDER — BENZONATATE 100 MG/1
100 CAPSULE ORAL
Status: DISCONTINUED | OUTPATIENT
Start: 2019-09-18 | End: 2019-09-20 | Stop reason: HOSPADM

## 2019-09-18 RX ADMIN — PANTOPRAZOLE SODIUM 40 MG: 40 TABLET, DELAYED RELEASE ORAL at 05:30

## 2019-09-18 RX ADMIN — INSULIN LISPRO 4 UNITS: 100 INJECTION, SOLUTION INTRAVENOUS; SUBCUTANEOUS at 17:27

## 2019-09-18 RX ADMIN — INSULIN LISPRO 10 UNITS: 100 INJECTION, SOLUTION INTRAVENOUS; SUBCUTANEOUS at 12:16

## 2019-09-18 RX ADMIN — BENZONATATE 100 MG: 100 CAPSULE ORAL at 22:12

## 2019-09-18 RX ADMIN — GABAPENTIN 100 MG: 100 CAPSULE ORAL at 22:11

## 2019-09-18 RX ADMIN — HEPARIN SODIUM 5000 UNITS: 5000 INJECTION INTRAVENOUS; SUBCUTANEOUS at 09:39

## 2019-09-18 RX ADMIN — INSULIN LISPRO 2 UNITS: 100 INJECTION, SOLUTION INTRAVENOUS; SUBCUTANEOUS at 22:11

## 2019-09-18 RX ADMIN — METOPROLOL TARTRATE 12.5 MG: 25 TABLET ORAL at 09:40

## 2019-09-18 RX ADMIN — INSULIN LISPRO 10 UNITS: 100 INJECTION, SOLUTION INTRAVENOUS; SUBCUTANEOUS at 17:27

## 2019-09-18 RX ADMIN — SENNOSIDES, DOCUSATE SODIUM 1 TABLET: 50; 8.6 TABLET, FILM COATED ORAL at 09:41

## 2019-09-18 RX ADMIN — INSULIN LISPRO 10 UNITS: 100 INJECTION, SOLUTION INTRAVENOUS; SUBCUTANEOUS at 12:17

## 2019-09-18 RX ADMIN — METOPROLOL TARTRATE 12.5 MG: 25 TABLET ORAL at 22:12

## 2019-09-18 RX ADMIN — INSULIN GLARGINE 50 UNITS: 100 INJECTION, SOLUTION SUBCUTANEOUS at 22:11

## 2019-09-18 RX ADMIN — PANTOPRAZOLE SODIUM 40 MG: 40 TABLET, DELAYED RELEASE ORAL at 17:28

## 2019-09-18 RX ADMIN — Medication 10 ML: at 14:17

## 2019-09-18 RX ADMIN — INSULIN LISPRO 10 UNITS: 100 INJECTION, SOLUTION INTRAVENOUS; SUBCUTANEOUS at 09:40

## 2019-09-18 RX ADMIN — ROSUVASTATIN CALCIUM 20 MG: 20 TABLET, COATED ORAL at 22:12

## 2019-09-18 RX ADMIN — Medication 10 ML: at 22:00

## 2019-09-18 RX ADMIN — BENZONATATE 100 MG: 100 CAPSULE ORAL at 19:38

## 2019-09-18 RX ADMIN — HEPARIN SODIUM 5000 UNITS: 5000 INJECTION INTRAVENOUS; SUBCUTANEOUS at 22:11

## 2019-09-18 RX ADMIN — GABAPENTIN 100 MG: 100 CAPSULE ORAL at 09:40

## 2019-09-18 RX ADMIN — FLUCONAZOLE 150 MG: 100 TABLET ORAL at 09:40

## 2019-09-18 RX ADMIN — Medication 10 ML: at 05:31

## 2019-09-18 RX ADMIN — GABAPENTIN 100 MG: 100 CAPSULE ORAL at 17:28

## 2019-09-18 NOTE — PROGRESS NOTES
Patient A/O x4.   2L NC during night. OOB to BR to void this shift. No c/o pain. Hourly rounds performed. All needs meet. Bed low/locked. Call light within reach. Patient denies needs at this time.   Will continue to monitor and report to oncoming RN

## 2019-09-18 NOTE — PROGRESS NOTES
Problem: Mobility Impaired (Adult and Pediatric) Goal: *Acute Goals and Plan of Care (Insert Text) Description LTG: 
(1.)Ms. Teresa Whitt will move from supine to sit and sit to supine, scoot up and down and roll side to side INDEPENDENTLY with bed flat within 7 treatment day(s). (2.)Ms. Teresa Whitt will transfer from bed to chair and chair to bed INDEPENDENTLY within 7 treatment day(s). (3.)Ms. Teresa Whitt will ambulate with MODIFIED INDEPENDENCE for 400 feet with the least restrictive device within 7 treatment day(s). (4.)Ms. Teresa Whitt will demonstrate intact static and dynamic balance during all ambulation and functional activities in standing using least restrictive device within 7 days. (5.)Ms. Teresa Whitt will perform exercises per HEP independently for 10+ minutes to improve strength and mobility within 7 days. ________________________________________________________________________________________________ Outcome: Progressing Towards Goal 
  
PHYSICAL THERAPY: Daily Note and AM 9/18/2019 OBSERVATION: PT Visit Days : 1 Payor: Ailyn Childs / Plan: [address] / Product Type: [facility] Medicare /   
  
NAME/AGE/GENDER: Isabel Cruz is a 78 y.o. female PRIMARY DIAGNOSIS: Acute renal failure (ARF) ([facility] [N17.9] Generalized weakness [R53.1] Decreased oral intake [R63.8] ARF (acute renal failure) ([facility]) [N17.9] ARF (acute renal failure) ([address] ARF (acute renal failure) ([facility] Procedure(s) (LRB): ESOPHAGOGASTRODUODENOSCOPY (EGD) STENT REPLACEMENT/ 35/ ROOM 610 (N/A) ENDOSCOPY WITH PROSTHESIS OR STENT REMOVAL (N/A) 1 Day Post-Op ICD-10: Treatment Diagnosis:  
 · Generalized Muscle Weakness (M62.81) · Difficulty in walking, Not elsewhere classified (R26.2) · Other abnormalities of gait and mobility (R26.89) Precaution/Allergies: 
Sulfa (sulfonamide antibiotics) ASSESSMENT:  
 
Ms. Teresa Whitt is an 78year old female admitted from home for acute renal failure, weakness. She lives alone and is independent to mod I at baseline with walker. Family assists as needed and reports that recently she has been having more difficulty using upper extremities for functional activities and has been experiencing difficulty with ADLs due to limited vision. Needing increased assistance. Recently discharged home from rehab. 
 
9/18- pt presents sitting up in chair without complaints and is agreeable to therapy treatment. Performs transfers with supervision. Ambulates 180 ft in room and hallway with fluctuating gait pace, walker, and stand by assist for safety. Verbal cues for activity pacing, gait speed, and safety. Took prolonged rest break due to fatigue, O2 sats 92% on room air. Returned to room with same assist level and very minimal cueing for safety. Up to chair for break with sats still 92%. Performs below lower and upper extremity exercises with great participation and a few rest breaks. Left sitting up with family present, needs in reach. Dc needs TBD pending progress; possibly back to [facility] for rehab. This section established at most recent assessment PROBLEM LIST (Impairments causing functional limitations): 1. Decreased Strength 2. Decreased Transfer Abilities 3. Decreased Ambulation Ability/Technique 4. Decreased Balance 5. Decreased Activity Tolerance INTERVENTIONS PLANNED: (Benefits and precautions of physical therapy have been discussed with the patient.) 1. Balance Exercise 2. Bed Mobility 3. Gait Training 4. Home Exercise Program (HEP) 5. Therapeutic Activites 6. Therapeutic Exercise/Strengthening 7. Transfer Training TREATMENT PLAN: Frequency/Duration: 3 times a week for duration of hospital stay Rehabilitation Potential For Stated Goals: Good REHAB RECOMMENDATIONS (at time of discharge pending progress):   
Placement:  
It is my opinion, based on this patient's performance to date, that Ms. Hallie Bolton may benefit from participating in 1-2 additional therapy sessions in order to continue to assess for rehab potential and then make recommendation for disposition at discharge. Rehab vs HH PT. Equipment: ? TBD pending progress HISTORY:  
History of Present Injury/Illness (Reason for Referral): 
Per H&P, \"Patient is an 78years old female with hx of CABG, HTN, HLD, CKD-1, s/p cholecystectomy, GERD, ESBL E.coli and Enterococcus bacteremia following ERCP on 7/24 for which she completed IV Merrem and Vancomycin on 9/6/19 presented with generalized weakness and poor oral intake for past 3-4 days. Pt was discharged home recently from [facility], pt was fine for couple of days but then started feeling exhausted, progressively declining overall, feels lethargic and fatigued with minimal exertion. Pt reports poor oral intake. Pt denies any abdominal pain, nausea/vomiting, fever, chills, diarrhea, headache, urinary symptoms. Pt was recently started on Bactrim DS by ID. Pt was also started on oral iron recently and since then pt had noticed some black colored stool. ER work up showed Cr 3.23 (baseline 1.04), K 5.2\" Past Medical History/Comorbidities: Ms. Hallie Bolton  has a past medical history of Acute cystitis without hematuria (1/30/2019), CAD (coronary artery disease), DM2 (diabetes mellitus, type 2) ([facility] Gout, HLD (hyperlipidemia), HTN (hypertension), and Peripheral neuropathy. Ms. Hallie Bolton  has a past surgical history that includes hx tubal ligation; hx coronary artery bypass graft; pr cardiac surg procedure unlist; and hx cholecystectomy. Social History/Living Environment:  
Home Environment: Apartment One/Two Story Residence: One story Living Alone: Yes Support Systems: Child(marga), Family member(s) Patient Expects to be Discharged to[de-identified] Unknown Current DME Used/Available at Home: Candelario Rodriguez, [address] E chair Tub or Shower Type: Tub/Shower combination Prior Level of Function/Work/Activity: 
Lives alone. Modified independent with rollator. Number of Personal Factors/Comorbidities that affect the Plan of Care: 1-2: MODERATE COMPLEXITY EXAMINATION:  
Most Recent Physical Functioning:  
Gross Assessment: 
AROM: Within functional limits Strength: Generally decreased, functional 
Coordination: Within functional limits Posture: 
Posture (WDL): Exceptions to [facility] Posture Assessment: Forward head, Rounded shoulders Balance: 
Sitting: Intact Standing: Impaired Standing - Static: Good Standing - Dynamic : Fair Bed Mobility: 
  
Wheelchair Mobility: 
  
Transfers: 
Sit to Stand: Supervision Stand to Sit: Supervision Bed to Chair: Supervision Interventions: Verbal cues; Safety awareness training Duration: 16 Minutes Gait: 
  
Speed/Noemi: Pace decreased (<100 feet/min) Step Length: Left shortened;Right shortened Gait Abnormalities: Trunk sway increased Distance (ft): 180 Feet (ft)(x2 with standing rest break d/t fatigue) Assistive Device: Walker, rolling Ambulation - Level of Assistance: Stand-by assistance Interventions: Verbal cues; Safety awareness training Body Structures Involved: 1. Muscles Body Functions Affected: 1. Movement Related Activities and Participation Affected: 1. General Tasks and Demands 2. Mobility 3. Domestic Life 4. Community, Social and [place] Number of elements that affect the Plan of Care: 4+: HIGH COMPLEXITY CLINICAL PRESENTATION:  
Presentation: Stable and uncomplicated: LOW COMPLEXITY CLINICAL DECISION MAKIN [facility] [address] [address] AM-PAC 6 Clicks Basic Mobility Inpatient Short Form How much difficulty does the patient currently have. .. Unable A Lot A Little None 1. Turning over in bed (including adjusting bedclothes, sheets and blankets)? ? 1   ? 2   ? 3   ? 4  
2.   Sitting down on and standing up from a chair with arms ( e.g., wheelchair, bedside commode, etc.)   ? 1   ? 2   ? 3   ? 4  
3. Moving from lying on back to sitting on the side of the bed?   ? 1   ? 2   ? 3   ? 4 How much help from another person does the patient currently need. .. Total A Lot A Little None 4. Moving to and from a bed to a chair (including a wheelchair)? ? 1   ? 2   ? 3   ? 4  
5. Need to walk in hospital room? ? 1   ? 2   ? 3   ? 4  
6. Climbing 3-5 steps with a railing? ? 1   ? 2   ? 3   ? 4  
© 2007, Trustees of [address] [place] [address] [address] [address], under license to [employer]. All rights reserved Score:  Initial: 21 Most Recent: X (Date: -- ) Interpretation of Tool:  Represents activities that are increasingly more difficult (i.e. Bed mobility, Transfers, Gait). Medical Necessity:    
· Patient demonstrates good ·  rehab potential due to higher previous functional level. Reason for Services/Other Comments: 
· Patient continues to demonstrate capacity to improve strength, balance, activity tolerance which will increase independence and increase safety · . Use of outcome tool(s) and clinical judgement create a POC that gives a: Clear prediction of patient's progress: LOW COMPLEXITY  
  
 
 
 
TREATMENT:  
(In addition to Assessment/Re-Assessment sessions the following treatments were rendered) Pre-treatment Symptoms/Complaints:  \"I feel good\" Pain: Initial:  
Pain Intensity 1: 0  Post Session:  0/10 Therapeutic Activity: (  16 Minutes ):  Therapeutic activities including Chair transfers, Ambulation on level ground and standing dynamic activities, and activity pacing techniques to improve mobility, strength, balance and activity tolerance. Required minimal Verbal cues; Safety awareness training to promote dynamic balance in standing. Therapeutic Exercise: (10 Minutes):  Exercises per grid below to improve mobility, strength and balance.   Required minimal visual and verbal cues to promote proper body mechanics and exercise form. Progressed repetitions and complexity of movement as indicated. Date: 
9/18/19 Date: 
 Date: Activity/Exercise Parameters Parameters Parameters LAQ 15x AB Seated marching 15x AB Ankle pumps 15x AB Seated hip aBd 15x AB Shoulder scaption 10x AB    
UE anterior punches 10x AB Biceps  10x AB Triceps  10x AB Braces/Orthotics/Lines/Etc:  
· none Treatment/Session Assessment:   
· Response to Treatment: good progress with distance, activity tolerance, exercises · Interdisciplinary Collaboration:  
o Physical Therapist 
o Registered Nurse · After treatment position/precautions:  
o Up in chair 
o Bed/Chair-wheels locked 
o Bed in low position 
o Call light within reach 
o Family at bedside · Compliance with Program/Exercises: Will assess as treatment progresses · Recommendations/Intent for next treatment session: \"Next visit will focus on advancements to more challenging activities and reduction in assistance provided\". Total Treatment Duration: PT Patient Time In/Time Out Time In: 1130 Time Out: 1156 Cari Bella DPT

## 2019-09-18 NOTE — DIABETES MGMT
Spoke with provider. Updated on pt most recent FSBS 394. Reviewed pt current regimen: Lantus 35 units nightly, Humalog 5 units with meals, and Humalog SSI. Per chart review pt was on Lantus 50 units nightly and Humalog SSI during last admission. Also noted per chart review pt has had a history of dietary noncompliance. New orders received to increase Humalog to 10 units with meals and increase basal insulin to Lantus 50 units. Primary RN made aware.

## 2019-09-18 NOTE — PROGRESS NOTES
Interdisciplinary Rounds completed 09/18/19. Nursing, Case Management, Physician and PT present. Plan of care reviewed and updated. D/C to [facility] on Saturday

## 2019-09-18 NOTE — PROGRESS NOTES
Kidney function improving toward baseline Can add back ARB when back to baseline if needed Will be available

## 2019-09-18 NOTE — DIABETES MGMT
Patient admitted with hyperglycemia. Admitting blood glucose 255. HbA1c in August was 9.7% likely skewed as Hgb was 6.8. Blood glucose range yesterday 124-326 with pt receiving Lantus 30 units and Humalog 12 units. This AM blood glucose 399. Attempted to see pt for educational assessment related to diabetes management. Pt currently in bathroom will re-attempt to see pt later today as time allows.

## 2019-09-18 NOTE — DIABETES MGMT
Re-attempted to see pt for diabetic education assessment. Noted plan is for pt to d/c to [facility] on Saturday. Pt currently in recliner, eyes closed, sleeping, did not disturb. No visitors at bedside. Plan to follow up with patient tomorrow.

## 2019-09-18 NOTE — PROGRESS NOTES
Problem: Falls - Risk of  Goal: *Absence of Falls  Description  Document Rita Mattson Fall Risk and appropriate interventions in the flowsheet.   Outcome: Progressing Towards Goal  Note:   Fall Risk Interventions:            Medication Interventions: Teach patient to arise slowly

## 2019-09-18 NOTE — PROGRESS NOTES
Progress Note 2019 Admit Date: 2019 10:55 AM  
NAME: Brenda Agarwal :  1940 MRN:  [id number] Attending: Rebekah Frances MD 
PCP:  Tien Sheldon MD 
Treatment Team: Attending Provider: Rebekah Frances MD; Consulting Provider: HERMINIO Coronel; Utilization Review: Leilani Salgado RN; Care Manager: Brock Ocasio RN; Primary Nurse: Anton Salazar RN; Physical Therapist: Rei Farnsworth DPT Full Code SUBJECTIVE:  
Ms. Celeste Lowry is a 79 yo female with PMH of CAD, DM II, HTN, peripheral neuropathy, hyperlipidemia, CKD 1, GERD, ESBL e.coli and enterococcus bacteremia following an ERCP  which she completed IV merrem and Vancomycin 19 who presented with c/o generalized weakness and poor oral intake. She was DC home recently from [facility] doing well then pt started to progressively decline overall, lethargic, poor oral intake. Pt was started on bactrim DS by ID recently. Pt also started on oral iron and reported black colored stool. Pt found to have PRATIMA with cre 3.23 (baseline around 1.04). Pt underwent EGD  with removal of biliary stent, findings of gastritis, instructed to hold plavix and NSAIDS X1 week. Nephrology consulted. Urine cx with NGTD. Creatinine trending down after IVF. A1C 9.7,  this morning. Pt reports eating ice cream and pudding this morning. Plans to DC to [facility] at [place]. Denies CP, SOB. Past Medical History:  
Diagnosis Date  Acute cystitis without hematuria 2019  CAD (coronary artery disease)  DM2 (diabetes mellitus, type 2) ([facility] [address]  Gout  HLD (hyperlipidemia)  HTN (hypertension)  Peripheral neuropathy Recent Results (from the past 24 hour(s)) HGB & HCT Collection Time: 19  3:28 PM  
Result Value Ref Range HGB 9.1 (L) 11.7 - 15.4 g/dL HCT 30.0 (L) 35.8 - 46.3 % GLUCOSE, POC Collection Time: 19  4:38 PM  
Result Value Ref Range Glucose (POC) 326 (H) 65 - 100 mg/dL GLUCOSE, POC Collection Time: 09/17/19  8:51 PM  
Result Value Ref Range Glucose (POC) 242 (H) 65 - 100 mg/dL PLEASE READ & DOCUMENT PPD TEST IN 24 HRS Collection Time: 09/17/19 11:15 PM  
Result Value Ref Range PPD Negative Negative  
 mm Induration 0 0 - 5 mm METABOLIC PANEL, BASIC Collection Time: 09/18/19  6:04 AM  
Result Value Ref Range Sodium 137 136 - 145 mmol/L Potassium 4.5 3.5 - 5.1 mmol/L Chloride 105 98 - 107 mmol/L  
 CO2 27 21 - 32 mmol/L Anion gap 5 (L) 7 - 16 mmol/L Glucose 340 (H) 65 - 100 mg/dL BUN 24 (H) 8 - 23 MG/DL Creatinine 1.82 (H) 0.6 - 1.0 MG/DL  
 GFR est AA 34 (L) >60 ml/min/1.73m2 GFR est non-AA 28 (L) >60 ml/min/1.73m2 Calcium 8.1 (L) 8.3 - 10.4 MG/DL  
CBC W/O DIFF Collection Time: 09/18/19  6:04 AM  
Result Value Ref Range WBC 7.1 4.3 - 11.1 K/uL  
 RBC 2.95 (L) 4.05 - 5.2 M/uL HGB 8.2 (L) 11.7 - 15.4 g/dL HCT 27.2 (L) 35.8 - 46.3 % MCV 92.2 79.6 - 97.8 FL  
 MCH 27.8 26.1 - 32.9 PG  
 MCHC 30.1 (L) 31.4 - 35.0 g/dL  
 RDW 15.5 (H) 11.9 - 14.6 % PLATELET 348 164 - 694 K/uL MPV 10.4 9.4 - 12.3 FL ABSOLUTE NRBC 0.00 0.0 - 0.2 K/uL GLUCOSE, POC Collection Time: 09/18/19  7:08 AM  
Result Value Ref Range Glucose (POC) 399 (H) 65 - 100 mg/dL Allergies Allergen Reactions  Sulfa (Sulfonamide Antibiotics) Swelling Current Facility-Administered Medications Medication Dose Route Frequency Provider Last Rate Last Dose  fluconazole (DIFLUCAN) tablet 150 mg  150 mg Oral DAILY Berna Collet, NP   150 mg at 09/18/19 0940  
 gabapentin (NEURONTIN) capsule 100 mg  100 mg Oral TID Ilya Soriano MD   100 mg at 09/18/19 0940  
 metoprolol tartrate (LOPRESSOR) tablet 12.5 mg  12.5 mg Oral Q12H Ilya Soriano MD   12.5 mg at 09/18/19 0940  pantoprazole (PROTONIX) tablet 40 mg  40 mg Oral ACB&D Ilya Soriano MD   40 mg at 09/18/19 0517  rOPINIRole (REQUIP) tablet 0.5 mg  0.5 mg Oral QHS PRN Sanjay Vincent MD      
 rosuvastatin (CRESTOR) tablet 20 mg  20 mg Oral QHS Sanjay Vincent MD   20 mg at 19 2251  sodium chloride (NS) flush 5-40 mL  5-40 mL IntraVENous Q8H Sanjay Vincent MD   10 mL at 19 0531  
 sodium chloride (NS) flush 5-40 mL  5-40 mL IntraVENous PRN Sanjay Vincent MD      
 tuberculin injection 5 Units  5 Units IntraDERMal Lori Saucedo MD      
 acetaminophen (TYLENOL) tablet 650 mg  650 mg Oral Q6H PRN Sanjay Vincent MD      
 oxyCODONE-acetaminophen (PERCOCET) 5-325 mg per tablet 1 Tab  1 Tab Oral Q6H PRN Sanjay Vincent MD      
 ondansetron [facility]) injection 4 mg  4 mg IntraVENous Q4H PRN Sanjay Vincent MD      
 senna-docusate (PERICOLACE) 8.6-50 mg per tablet 1 Tab  1 Tab Oral DAILY Sanjay Vincent MD   1 Tab at 19 0941  
 heparin (porcine) injection 5,000 Units  5,000 Units SubCUTAneous Q12H Sanjay Vincent MD   5,000 Units at 19 0939  
 insulin lispro (HUMALOG) injection   SubCUTAneous AC&HS Sanjay Vincent MD   10 Units at 19 0940  
 insulin glargine (LANTUS) injection 30 Units  30 Units SubCUTAneous QHS Sanjay Vincent MD   30 Units at 191 Review of Systems negative with exception of pertinent positives noted above PHYSICAL EXAM  
 
Visit Vitals /60 (BP 1 Location: Left arm, BP Patient Position: Head of bed elevated (Comment degrees)) Comment (BP Patient Position): 45 Pulse 90 Temp 97.8 °F (36.6 °C) Resp 20 Ht 5' 2\" (1.575 m) Wt 85.7 kg (189 lb) SpO2 95% BMI 34.57 kg/m² Temp (24hrs), Av.3 °F (36.8 °C), Min:97.8 °F (36.6 °C), Max:99 °F (37.2 °C) Oxygen Therapy O2 Sat (%): 95 % (19 6717) Pulse via Oximetry: 83 beats per minute (19) O2 Device: Room air(found on RA) (19) O2 Flow Rate (L/min): 2 l/min (19 1247) Intake/Output Summary (Last 24 hours) at 9/18/2019 1050 Last data filed at 9/18/2019 1000 Gross per 24 hour Intake 2584 ml Output 850 ml Net 1734 ml General: No acute distress   
Lungs: CTA bilaterally. resp even and nonlabored Heart:  S1S2 present without murmurs rubs gallops. RRR. No LE edema Abdomen: Soft, non tender, non distended. BS present Extremities: Moves ext spontaneously. No cyanosis. Neurologic:  A/O X3. No focal deficits. Results summary of Diagnostic Studies/Procedures copied from within [facility] EMR: 
 
 
 
ASSESSMENT Active Hospital Problems Diagnosis Date Noted  ARF (acute renal failure) ([facility] [address] 09/17/2019  Generalized weakness 09/16/2019  Decreased oral intake 09/16/2019  Acute renal failure (ARF) ([facility] [address] 08/08/2019  Anemia 08/08/2019  Essential hypertension 06/15/2015  Gastroesophageal reflux disease without esophagitis 06/15/2015  HLD (hyperlipidemia) 06/15/2015  RLS (restless legs syndrome) 06/15/2015 Plan: PRATIMA on CKD 1 Nephrology consulted On bactrim outpatient per ID, holding Continue IVF Creatinine improving Generalized weakness and debility Will return to [facility] at [place] 
PT/OT 
 
HTN Continue home meds Melena S/p EGD 9/17 with findings of gastritis and biliary stent successfully removed Holding plavix and ASA X1 week Continue protonix Recent ESBL e.coli UTI and enterococcus bacteremia Completed IV Vanc and merrem 9/6/19 Urine cx NGTD 
 
DM II 
A1C 9.7 BGL elevated Increase lantus, add prandial insulin, SSI 
 
CAD On BB Holding plavix Notes, labs, VS, diagnostic testing reviewed Time spent with pt 20 min DVT Prophylaxis:  Heparin sq Plan of Care Discussed with: Supervising MD  Dr. Sloan Do, care team, pt Dorota Hines, CHER

## 2019-09-19 ENCOUNTER — APPOINTMENT (OUTPATIENT)
Dept: GENERAL RADIOLOGY | Age: 79
End: 2019-09-19
Attending: NURSE PRACTITIONER
Payer: MEDICARE

## 2019-09-19 ENCOUNTER — HOME HEALTH ADMISSION (OUTPATIENT)
Dept: HOME HEALTH SERVICES | Facility: HOME HEALTH | Age: 79
End: 2019-09-19

## 2019-09-19 LAB
ANION GAP SERPL CALC-SCNC: 5 MMOL/L (ref 7–16)
BUN SERPL-MCNC: 22 MG/DL (ref 8–23)
CALCIUM SERPL-MCNC: 8.3 MG/DL (ref 8.3–10.4)
CHLORIDE SERPL-SCNC: 107 MMOL/L (ref 98–107)
CO2 SERPL-SCNC: 28 MMOL/L (ref 21–32)
CREAT SERPL-MCNC: 1.64 MG/DL (ref 0.6–1)
ERYTHROCYTE [DISTWIDTH] IN BLOOD BY AUTOMATED COUNT: 15.4 % (ref 11.9–14.6)
GLUCOSE BLD STRIP.AUTO-MCNC: 184 MG/DL (ref 65–100)
GLUCOSE BLD STRIP.AUTO-MCNC: 222 MG/DL (ref 65–100)
GLUCOSE BLD STRIP.AUTO-MCNC: 272 MG/DL (ref 65–100)
GLUCOSE BLD STRIP.AUTO-MCNC: 283 MG/DL (ref 65–100)
GLUCOSE SERPL-MCNC: 175 MG/DL (ref 65–100)
HCT VFR BLD AUTO: 27.6 % (ref 35.8–46.3)
HCT VFR BLD AUTO: 28.2 % (ref 35.8–46.3)
HGB BLD-MCNC: 8.5 G/DL (ref 11.7–15.4)
HGB BLD-MCNC: 8.8 G/DL (ref 11.7–15.4)
MCH RBC QN AUTO: 29 PG (ref 26.1–32.9)
MCHC RBC AUTO-ENTMCNC: 31.2 G/DL (ref 31.4–35)
MCV RBC AUTO: 93.1 FL (ref 79.6–97.8)
NRBC # BLD: 0 K/UL (ref 0–0.2)
PLATELET # BLD AUTO: 257 K/UL (ref 150–450)
PMV BLD AUTO: 10.6 FL (ref 9.4–12.3)
POTASSIUM SERPL-SCNC: 4.2 MMOL/L (ref 3.5–5.1)
RBC # BLD AUTO: 3.03 M/UL (ref 4.05–5.2)
SODIUM SERPL-SCNC: 140 MMOL/L (ref 136–145)
WBC # BLD AUTO: 10.5 K/UL (ref 4.3–11.1)

## 2019-09-19 PROCEDURE — 74011250637 HC RX REV CODE- 250/637: Performed by: NURSE PRACTITIONER

## 2019-09-19 PROCEDURE — 74011636637 HC RX REV CODE- 636/637: Performed by: NURSE PRACTITIONER

## 2019-09-19 PROCEDURE — 71045 X-RAY EXAM CHEST 1 VIEW: CPT

## 2019-09-19 PROCEDURE — 74011636637 HC RX REV CODE- 636/637: Performed by: HOSPITALIST

## 2019-09-19 PROCEDURE — 96372 THER/PROPH/DIAG INJ SC/IM: CPT

## 2019-09-19 PROCEDURE — 82962 GLUCOSE BLOOD TEST: CPT

## 2019-09-19 PROCEDURE — 85018 HEMOGLOBIN: CPT

## 2019-09-19 PROCEDURE — 97530 THERAPEUTIC ACTIVITIES: CPT

## 2019-09-19 PROCEDURE — 80048 BASIC METABOLIC PNL TOTAL CA: CPT

## 2019-09-19 PROCEDURE — 99218 HC RM OBSERVATION: CPT

## 2019-09-19 PROCEDURE — 36415 COLL VENOUS BLD VENIPUNCTURE: CPT

## 2019-09-19 PROCEDURE — 74011250636 HC RX REV CODE- 250/636: Performed by: HOSPITALIST

## 2019-09-19 PROCEDURE — 74011250637 HC RX REV CODE- 250/637: Performed by: HOSPITALIST

## 2019-09-19 PROCEDURE — 85027 COMPLETE CBC AUTOMATED: CPT

## 2019-09-19 RX ORDER — METOPROLOL TARTRATE 25 MG/1
12.5 TABLET, FILM COATED ORAL EVERY 12 HOURS
Qty: 30 TAB | Refills: 2 | Status: SHIPPED | OUTPATIENT
Start: 2019-09-19 | End: 2019-10-19

## 2019-09-19 RX ORDER — INSULIN LISPRO 100 [IU]/ML
15 INJECTION, SOLUTION INTRAVENOUS; SUBCUTANEOUS
Status: DISCONTINUED | OUTPATIENT
Start: 2019-09-19 | End: 2019-09-20 | Stop reason: HOSPADM

## 2019-09-19 RX ORDER — INSULIN LISPRO 100 [IU]/ML
15 INJECTION, SOLUTION INTRAVENOUS; SUBCUTANEOUS
Qty: 1 VIAL | Refills: 0 | Status: SHIPPED
Start: 2019-09-19 | End: 2019-09-20

## 2019-09-19 RX ORDER — INSULIN GLARGINE 100 [IU]/ML
55 INJECTION, SOLUTION SUBCUTANEOUS
Status: DISCONTINUED | OUTPATIENT
Start: 2019-09-19 | End: 2019-09-20 | Stop reason: HOSPADM

## 2019-09-19 RX ORDER — PANTOPRAZOLE SODIUM 40 MG/1
40 TABLET, DELAYED RELEASE ORAL DAILY
Qty: 30 TAB | Refills: 2 | Status: SHIPPED | OUTPATIENT
Start: 2019-09-19 | End: 2019-10-19

## 2019-09-19 RX ADMIN — INSULIN LISPRO 4 UNITS: 100 INJECTION, SOLUTION INTRAVENOUS; SUBCUTANEOUS at 21:53

## 2019-09-19 RX ADMIN — HEPARIN SODIUM 5000 UNITS: 5000 INJECTION INTRAVENOUS; SUBCUTANEOUS at 08:17

## 2019-09-19 RX ADMIN — Medication 10 ML: at 05:34

## 2019-09-19 RX ADMIN — INSULIN LISPRO 10 UNITS: 100 INJECTION, SOLUTION INTRAVENOUS; SUBCUTANEOUS at 08:18

## 2019-09-19 RX ADMIN — INSULIN LISPRO 6 UNITS: 100 INJECTION, SOLUTION INTRAVENOUS; SUBCUTANEOUS at 12:10

## 2019-09-19 RX ADMIN — GABAPENTIN 100 MG: 100 CAPSULE ORAL at 08:16

## 2019-09-19 RX ADMIN — GABAPENTIN 100 MG: 100 CAPSULE ORAL at 21:53

## 2019-09-19 RX ADMIN — SENNOSIDES, DOCUSATE SODIUM 1 TABLET: 50; 8.6 TABLET, FILM COATED ORAL at 08:16

## 2019-09-19 RX ADMIN — INSULIN LISPRO 6 UNITS: 100 INJECTION, SOLUTION INTRAVENOUS; SUBCUTANEOUS at 17:05

## 2019-09-19 RX ADMIN — PANTOPRAZOLE SODIUM 40 MG: 40 TABLET, DELAYED RELEASE ORAL at 05:33

## 2019-09-19 RX ADMIN — BENZONATATE 100 MG: 100 CAPSULE ORAL at 05:40

## 2019-09-19 RX ADMIN — Medication 10 ML: at 22:00

## 2019-09-19 RX ADMIN — INSULIN LISPRO 2 UNITS: 100 INJECTION, SOLUTION INTRAVENOUS; SUBCUTANEOUS at 08:18

## 2019-09-19 RX ADMIN — HEPARIN SODIUM 5000 UNITS: 5000 INJECTION INTRAVENOUS; SUBCUTANEOUS at 21:52

## 2019-09-19 RX ADMIN — ROSUVASTATIN CALCIUM 20 MG: 20 TABLET, COATED ORAL at 21:53

## 2019-09-19 RX ADMIN — INSULIN LISPRO 10 UNITS: 100 INJECTION, SOLUTION INTRAVENOUS; SUBCUTANEOUS at 12:10

## 2019-09-19 RX ADMIN — METOPROLOL TARTRATE 12.5 MG: 25 TABLET ORAL at 21:53

## 2019-09-19 RX ADMIN — INSULIN LISPRO 15 UNITS: 100 INJECTION, SOLUTION INTRAVENOUS; SUBCUTANEOUS at 17:05

## 2019-09-19 RX ADMIN — METOPROLOL TARTRATE 12.5 MG: 25 TABLET ORAL at 08:16

## 2019-09-19 RX ADMIN — INSULIN GLARGINE 55 UNITS: 100 INJECTION, SOLUTION SUBCUTANEOUS at 21:53

## 2019-09-19 RX ADMIN — FLUCONAZOLE 150 MG: 100 TABLET ORAL at 08:16

## 2019-09-19 RX ADMIN — GABAPENTIN 100 MG: 100 CAPSULE ORAL at 16:55

## 2019-09-19 RX ADMIN — PANTOPRAZOLE SODIUM 40 MG: 40 TABLET, DELAYED RELEASE ORAL at 16:55

## 2019-09-19 NOTE — DIABETES MGMT
Spoke with provider, updated on pt most recent FSBS 272. New orders received to increase pt basal insulin to Lantus 55 units nightly as fasting blood glucose is not at goal and increase pt prandial insulin to Humalog 15 units with meals as pt blood glucose levels remain elevated related to caloric intake during the day.

## 2019-09-19 NOTE — PROGRESS NOTES
Progress Note 2019 Admit Date: 2019 10:55 AM  
NAME: Mildred Carpenter :  1940 MRN:  [id number] Attending: Sanjay Vincent MD 
PCP:  Ortega Posey MD 
Treatment Team: Attending Provider: Sanjay Vincent MD; Consulting Provider: HERMINIO Avelar; Utilization Review: Nohemi Schwartz RN; Care Manager: Naz Brody RN; Physical Therapist: Maryan Byrd, PT; Occupational Therapy Assistant: Rajendra Gallegos Full Code SUBJECTIVE:  
Ms. Edy Arredondo is a 79 yo female with PMH of CAD, DM II, HTN, peripheral neuropathy, hyperlipidemia, CKD 1, GERD, ESBL e.coli and enterococcus bacteremia following an ERCP  which she completed IV merrem and Vancomycin 19 who presented with c/o generalized weakness and poor oral intake. She was DC home recently from [facility] doing well then pt started to progressively decline overall, lethargic, poor oral intake. Pt was started on bactrim DS by ID recently. Pt also started on oral iron and reported black colored stool. Pt found to have PRATIMA with cre 3.23 (baseline around 1.04). Pt underwent EGD  with removal of biliary stent, findings of gastritis, instructed to hold plavix and NSAIDS X1 week. Nephrology consulted. Urine cx with NGTD. Creatinine trending down after IVF. A1C 9.7, BGL more controlled after insulin adjsutment. Plans to DC to [facility] at [facility], waiting on precert. Denies CP, SOB. Past Medical History:  
Diagnosis Date  Acute cystitis without hematuria 2019  CAD (coronary artery disease)  DM2 (diabetes mellitus, type 2) ([facility] [address]  Gout  HLD (hyperlipidemia)  HTN (hypertension)  Peripheral neuropathy Recent Results (from the past 24 hour(s)) GLUCOSE, POC Collection Time: 19 11:47 AM  
Result Value Ref Range Glucose (POC) 394 (H) 65 - 100 mg/dL HGB & HCT Collection Time: 19  2:02 PM  
Result Value Ref Range HGB 9.5 (L) 11.7 - 15.4 g/dL HCT 31.2 (L) 35.8 - 46.3 % GLUCOSE, POC Collection Time: 09/18/19  3:46 PM  
Result Value Ref Range Glucose (POC) 233 (H) 65 - 100 mg/dL GLUCOSE, POC Collection Time: 09/18/19  8:39 PM  
Result Value Ref Range Glucose (POC) 165 (H) 65 - 100 mg/dL METABOLIC PANEL, BASIC Collection Time: 09/19/19  7:15 AM  
Result Value Ref Range Sodium 140 136 - 145 mmol/L Potassium 4.2 3.5 - 5.1 mmol/L Chloride 107 98 - 107 mmol/L  
 CO2 28 21 - 32 mmol/L Anion gap 5 (L) 7 - 16 mmol/L Glucose 175 (H) 65 - 100 mg/dL BUN 22 8 - 23 MG/DL Creatinine 1.64 (H) 0.6 - 1.0 MG/DL  
 GFR est AA 39 (L) >60 ml/min/1.73m2 GFR est non-AA 32 (L) >60 ml/min/1.73m2 Calcium 8.3 8.3 - 10.4 MG/DL  
CBC W/O DIFF Collection Time: 09/19/19  7:15 AM  
Result Value Ref Range WBC 10.5 4.3 - 11.1 K/uL  
 RBC 3.03 (L) 4.05 - 5.2 M/uL HGB 8.8 (L) 11.7 - 15.4 g/dL HCT 28.2 (L) 35.8 - 46.3 % MCV 93.1 79.6 - 97.8 FL  
 MCH 29.0 26.1 - 32.9 PG  
 MCHC 31.2 (L) 31.4 - 35.0 g/dL  
 RDW 15.4 (H) 11.9 - 14.6 % PLATELET 626 532 - 822 K/uL MPV 10.6 9.4 - 12.3 FL ABSOLUTE NRBC 0.00 0.0 - 0.2 K/uL GLUCOSE, POC Collection Time: 09/19/19  7:40 AM  
Result Value Ref Range Glucose (POC) 184 (H) 65 - 100 mg/dL Allergies Allergen Reactions  Sulfa (Sulfonamide Antibiotics) Swelling Current Facility-Administered Medications Medication Dose Route Frequency Provider Last Rate Last Dose  insulin lispro (HUMALOG) injection 10 Units  10 Units SubCUTAneous TIDAC Kristina Lauren B NP   10 Units at 09/19/19 9750  insulin glargine (LANTUS) injection 50 Units  50 Units SubCUTAneous QHS Kristina CHARLES NP   50 Units at 09/18/19 2211  benzonatate (TESSALON) capsule 100 mg  100 mg Oral TID PRN Kristina CHARLES NP   100 mg at 09/19/19 0563  fluconazole (DIFLUCAN) tablet 150 mg  150 mg Oral DAILY Ashish Ferrera NP   150 mg at 09/19/19 5518  gabapentin (NEURONTIN) capsule 100 mg  100 mg Oral TID Shola Ravi MD   100 mg at 19 0816  
 metoprolol tartrate (LOPRESSOR) tablet 12.5 mg  12.5 mg Oral Q12H Shola Ravi MD   12.5 mg at 19 0816  
 pantoprazole (PROTONIX) tablet 40 mg  40 mg Oral ACB&D Shola Ravi MD   40 mg at 19 0533  
 rOPINIRole (REQUIP) tablet 0.5 mg  0.5 mg Oral QHS PRN Shola Ravi MD      
 rosuvastatin (CRESTOR) tablet 20 mg  20 mg Oral QHS Shola Ravi MD   20 mg at 19 2212  sodium chloride (NS) flush 5-40 mL  5-40 mL IntraVENous Q8H Shola Ravi MD   10 mL at 19 994 41 661  sodium chloride (NS) flush 5-40 mL  5-40 mL IntraVENous PRN Shola Ravi MD      
 acetaminophen (TYLENOL) tablet 650 mg  650 mg Oral Q6H PRN Shola Ravi MD      
 oxyCODONE-acetaminophen (PERCOCET) 5-325 mg per tablet 1 Tab  1 Tab Oral Q6H PRN Shola Ravi MD      
 ondansetron [facility]) injection 4 mg  4 mg IntraVENous Q4H PRN Shola Ravi MD      
 senna-docusate (PERICOLACE) 8.6-50 mg per tablet 1 Tab  1 Tab Oral DAILY Shola Ravi MD   1 Tab at 19 0816  
 heparin (porcine) injection 5,000 Units  5,000 Units SubCUTAneous Q12H Shola Ravi MD   5,000 Units at 19 0817  
 insulin lispro (HUMALOG) injection   SubCUTAneous AC&HS Shola Ravi MD   2 Units at 19 5955 Review of Systems negative with exception of pertinent positives noted above PHYSICAL EXAM  
 
Visit Vitals /67 (BP 1 Location: Left arm, BP Patient Position: Sitting) Pulse 85 Temp 99.9 °F (37.7 °C) Resp 20 Ht 5' 2\" (1.575 m) Wt 85.7 kg (189 lb) SpO2 91% BMI 34.57 kg/m² Temp (24hrs), Av.6 °F (37 °C), Min:97.8 °F (36.6 °C), Max:99.9 °F (37.7 °C) Oxygen Therapy O2 Sat (%): 91 % (19 0744) Pulse via Oximetry: 83 beats per minute (19 1920) O2 Device: Room air (19 1130) O2 Flow Rate (L/min): 2 l/min (19 1247) Intake/Output Summary (Last 24 hours) at 9/19/2019 1024 Last data filed at 9/19/2019 1693 Gross per 24 hour Intake 837 ml Output 1550 ml Net -713 ml General:       No acute distress   
Lungs:          CTA bilaterally. resp even and nonlabored Heart:            S1S2 present without murmurs rubs gallops. RRR. No LE edema Abdomen:    Soft, non tender, non distended. BS present Extremities: Moves ext spontaneously. No cyanosis. Neurologic:  A/O X3. No focal deficits. Results summary of Diagnostic Studies/Procedures copied from within [facility] EMR: 
 
 
ASSESSMENT Active Hospital Problems Diagnosis Date Noted  ARF (acute renal failure) ([facility] [address] 09/17/2019  Generalized weakness 09/16/2019  Decreased oral intake 09/16/2019  Acute renal failure (ARF) ([facility] [address] 08/08/2019  Anemia 08/08/2019  Essential hypertension 06/15/2015  Gastroesophageal reflux disease without esophagitis 06/15/2015  HLD (hyperlipidemia) 06/15/2015  RLS (restless legs syndrome) 06/15/2015 Plan: PRATIMA on CKD 1 Nephrology consulted On bactrim outpatient per ID, holding Continue IVF Creatinine improving 
  
Generalized weakness and debility Will return to [facility] at [place] 
PT/OT 
  
HTN Continue home meds 
  
Melena S/p EGD 9/17 with findings of gastritis and biliary stent successfully removed Holding plavix and ASA X1 week Continue protonix 
  
Recent ESBL e.coli UTI and enterococcus bacteremia Completed IV Vanc and merrem 9/6/19 Urine cx NGTD 
  
DM II 
A1C 9.7 BGL more controlled Continue current regimen 
  
CAD On BB Holding plavix 
  
  
Notes, labs, VS, diagnostic testing reviewed Time spent with pt 20 min 
  
  
DVT Prophylaxis:  Heparin sq Plan of Care Discussed with: Supervising MD  Dr. Sara Juarez, care team, pt 
  
  
  
Suzy Nielson NP

## 2019-09-19 NOTE — PROGRESS NOTES
Hourly rounds completed, pt denies needs at this time. Pt with no complaints this shift. Will go home with [facility]. Prandial insulin increased to 15 units, and Lanus to 55 units at bedtime.

## 2019-09-19 NOTE — PROGRESS NOTES
Call received from Dr. Jair Dunham stating peer to peer was completed and insurance has kept denial. Notified daughter. [facility] Family would like referral sent to [facility]. Referral sent. liaisons notified.

## 2019-09-19 NOTE — PROGRESS NOTES
received a call from patient's insurance stating patient has been denied. Peer to peer to be completed by Dr. Juanita Sandoval. Awaiting result.

## 2019-09-19 NOTE — PROGRESS NOTES
[facility] - INPATIENT Face to Face Encounter Patients Name: Cesario Madden    YOB: 1940 Ordering Physician: Dr. Melissa Jones Primary Diagnosis: Acute renal failure (ARF) (HCC) [N17.9] Generalized weakness [R53.1] Decreased oral intake [R63.8] ARF (acute renal failure) ([facility] [address] [N17.9] Date of Face to Face:   9/19/2019 Face to Face Encounter findings are related to primary reason for home care:   yes. 1. I certify that the patient needs intermittent care as follows: skilled nursing care:  skilled observation/assessment, patient education and rehabilitative nursing 
physical therapy: strengthening, stretching/ROM, transfer training, gait/stair training, balance training and pt/caregiver education 
occupational therapy:  ADL safety (ie. cooking, bathing, dressing), ROM and pt/caregiver education 2. I certify that this patient is homebound, that is: 1) patient requires the use of a walker device, special transportation, or assistance of another to leave the home; or 2) patient's condition makes leaving the home medically contraindicated; and 3) patient has a normal inability to leave the home and leaving the home requires considerable and taxing effort. Patient may leave the home for infrequent and short duration for medical reasons, and occasional absences for non-medical reasons. Homebound status is due to the following functional limitations: Patient with strength deficits limiting the performance of all ADL's without caregiver assistance or the use of an assistive device. Patient with poor safety awareness and is at risk for falls without assistance of another person and the use of an assistive device. Patient with poor ambulation endurance limiting their safe ability to ascend/descend the required number of steps to leave the home.  
 
3. I certify that this patient is under my care and that I, or a nurse practitioner or  [id number], or clinical nurse specialist, or certified nurse midwife, working with me, had a Face-to-Face Encounter that meets the physician Face-to-Face Encounter requirements. The following are the clinical findings from the [address] [place] [address] encounter that support the need for skilled services and is a summary of the encounter: see hospital chart See hospital chart Gonzalez Wilson RN 
9/19/2019 THE FOLLOWING TO BE COMPLETED BY THE COMMUNITY PHYSICIAN: 
 
I concur with the findings described above from the F2F encounter that this patient is homebound and in need of a skilled service. Certifying Physician: _____________________________________ Printed Certifying Physician Name: _____________________________________ Date: _________________

## 2019-09-19 NOTE — PROGRESS NOTES
Problem: Mobility Impaired (Adult and Pediatric) Goal: *Acute Goals and Plan of Care Description LTG: 
(1.)Ms. Swati Celeste will move from supine to sit and sit to supine, scoot up and down and roll side to side INDEPENDENTLY with bed flat within 7 treatment day(s). (2.)Ms. Swati Celeste will transfer from bed to chair and chair to bed INDEPENDENTLY within 7 treatment day(s). (3.)Ms. Swati Celeste will ambulate with MODIFIED INDEPENDENCE for 400 feet with the least restrictive device within 7 treatment day(s). (4.)Ms. Swati Celeste will demonstrate intact static and dynamic balance during all ambulation and functional activities in standing using least restrictive device within 7 days. (5.)Ms. Swati Celeste will perform exercises per HEP independently for 10+ minutes to improve strength and mobility within 7 days. ________________________________________________________________________________________________ Outcome: Progressing Towards Goal 
 
PHYSICAL THERAPY: Daily Note and AM 9/19/2019 OBSERVATION: PT Visit Days : 2 Payor: Cindy Segura / Plan: [address] / Product Type: [employer] Medicare /   
  
NAME/AGE/GENDER: Cecilia Bridges is a 78 y.o. female PRIMARY DIAGNOSIS: Acute renal failure (ARF) ([facility] [address] [N17.9] Generalized weakness [R53.1] Decreased oral intake [R63.8] ARF (acute renal failure) ([facility]) [N17.9] ARF (acute renal failure) ([address] ARF (acute renal failure) ([facility] [address] Procedure(s) (LRB): ESOPHAGOGASTRODUODENOSCOPY (EGD) STENT REPLACEMENT/ 35/ ROOM 610 (N/A) ENDOSCOPY WITH PROSTHESIS OR STENT REMOVAL (N/A) 2 Days Post-Op ICD-10: Treatment Diagnosis:  
 · Generalized Muscle Weakness (M62.81) · Difficulty in walking, Not elsewhere classified (R26.2) · Other abnormalities of gait and mobility (R26.89) Precaution/Allergies: 
Sulfa (sulfonamide antibiotics) ASSESSMENT:  
 
Ms. Swati Celeste is an 78year old female admitted from home for acute renal failure, weakness. She lives alone and is independent to mod I at baseline with walker. Family assists as needed and reports that recently she has been having more difficulty using upper extremities for functional activities and has been experiencing difficulty with ADLs due to limited vision. Needing increased assistance. Recently discharged home from rehab. 
 
9/19: Pt presents sitting up in chair without complaints and is agreeable to therapy treatment. VS at rest: HR 73 bpm, O2 95% on room air. No pain reported. Sit <> stand with supervision, gait in room to bathroom with no assistive device with fair standing balance control, CGA due to no external support from RW. Pt then ambulated x250 ft with RW, SBA for safety. Good pacing, cues for safety and upright posture. No rest break required this session. Upon returning to room PT instructed pt in BLE strengthening exercises. Pt made good progress with gait, activity tolerance and overall mobility this afternoon. Above goals continue to be appropriate. At end of session pt left sitting up in chair, all needs in reach. Dc needs TBD pending progress; possibly back to [facility] for rehab. This section established at most recent assessment PROBLEM LIST (Impairments causing functional limitations): 1. Decreased Strength 2. Decreased Transfer Abilities 3. Decreased Ambulation Ability/Technique 4. Decreased Balance 5. Decreased Activity Tolerance INTERVENTIONS PLANNED: (Benefits and precautions of physical therapy have been discussed with the patient.) 1. Balance Exercise 2. Bed Mobility 3. Gait Training 4. Home Exercise Program (HEP) 5. Therapeutic Activites 6. Therapeutic Exercise/Strengthening 7. Transfer Training TREATMENT PLAN: Frequency/Duration: 3 times a week for duration of hospital stay Rehabilitation Potential For Stated Goals: Good REHAB RECOMMENDATIONS (at time of discharge pending progress):   
Placement: It is my opinion, based on this patient's performance to date, that Ms. Sarah Stephens may benefit from participating in 1-2 additional therapy sessions in order to continue to assess for rehab potential and then make recommendation for disposition at discharge. Rehab vs  PT. Equipment: ? TBD pending progress HISTORY:  
History of Present Injury/Illness (Reason for Referral): 
Per H&P, \"Patient is an 78years old female with hx of CABG, HTN, HLD, CKD-1, s/p cholecystectomy, GERD, ESBL E.coli and Enterococcus bacteremia following ERCP on 7/24 for which she completed IV Merrem and Vancomycin on 9/6/19 presented with generalized weakness and poor oral intake for past 3-4 days. Pt was discharged home recently from [facility], pt was fine for couple of days but then started feeling exhausted, progressively declining overall, feels lethargic and fatigued with minimal exertion. Pt reports poor oral intake. Pt denies any abdominal pain, nausea/vomiting, fever, chills, diarrhea, headache, urinary symptoms. Pt was recently started on Bactrim DS by ID. Pt was also started on oral iron recently and since then pt had noticed some black colored stool. ER work up showed Cr 3.23 (baseline 1.04), K 5.2\" Past Medical History/Comorbidities: Ms. Sarah Stephens  has a past medical history of Acute cystitis without hematuria (1/30/2019), CAD (coronary artery disease), DM2 (diabetes mellitus, type 2) ([facility] [address] Gout, HLD (hyperlipidemia), HTN (hypertension), and Peripheral neuropathy. Ms. Sarah Stephens  has a past surgical history that includes hx tubal ligation; hx coronary artery bypass graft; pr cardiac surg procedure unlist; and hx cholecystectomy. Social History/Living Environment:  
Home Environment: Apartment One/Two Story Residence: One story Living Alone: Yes Support Systems: Child(marga), Family member(s) Patient Expects to be Discharged to[de-identified] Unknown Current DME Used/Available at Home: Candelario Mcdonald, [address] [facility] chair Tub or Shower Type: Tub/Shower combination Prior Level of Function/Work/Activity: 
Lives alone. Modified independent with rollator. Number of Personal Factors/Comorbidities that affect the Plan of Care: 1-2: MODERATE COMPLEXITY EXAMINATION:  
Most Recent Physical Functioning:  
Gross Assessment: 
  
         
  
Posture: 
  
Balance: 
Sitting: Intact Standing: Impaired Standing - Static: Good Standing - Dynamic : Fair Bed Mobility: 
  
Wheelchair Mobility: 
  
Transfers: 
Sit to Stand: Supervision Stand to Sit: Supervision Bed to Chair: Supervision Interventions: Safety awareness training;Verbal cues Gait: 
  
Base of Support: Center of gravity altered Speed/Noemi: Shuffled; Slow Step Length: Left shortened;Right shortened Gait Abnormalities: Trunk sway increased Distance (ft): 250 Feet (ft) Assistive Device: Walker, rolling Ambulation - Level of Assistance: Stand-by assistance Interventions: Verbal cues; Safety awareness training Body Structures Involved: 1. Muscles Body Functions Affected: 1. Movement Related Activities and Participation Affected: 1. General Tasks and Demands 2. Mobility 3. Domestic Life 4. Community, Social and [place] Number of elements that affect the Plan of Care: 4+: HIGH COMPLEXITY CLINICAL PRESENTATION:  
Presentation: Stable and uncomplicated: LOW COMPLEXITY CLINICAL DECISION MAKIN [facility] [address] [id number] AM-PAC 6 Clicks Basic Mobility Inpatient Short Form How much difficulty does the patient currently have. .. Unable A Lot A Little None 1. Turning over in bed (including adjusting bedclothes, sheets and blankets)? ? 1   ? 2   ? 3   ? 4  
2. Sitting down on and standing up from a chair with arms ( e.g., wheelchair, bedside commode, etc.)   ? 1   ? 2   ? 3   ? 4  
3. Moving from lying on back to sitting on the side of the bed?   ? 1   ? 2   ? 3   ? 4 How much help from another person does the patient currently need. ..  Total A Lot A Little None 4. Moving to and from a bed to a chair (including a wheelchair)? ? 1   ? 2   ? 3   ? 4  
5. Need to walk in hospital room? ? 1   ? 2   ? 3   ? 4  
6. Climbing 3-5 steps with a railing? ? 1   ? 2   ? 3   ? 4  
© 2007, Trustees of [address] [place] [address] [address] [address], under license to [employer]. All rights reserved Score:  Initial: 21 Most Recent: X (Date: -- ) Interpretation of Tool:  Represents activities that are increasingly more difficult (i.e. Bed mobility, Transfers, Gait). Medical Necessity:    
· Patient demonstrates good ·  rehab potential due to higher previous functional level. Reason for Services/Other Comments: 
· Patient continues to demonstrate capacity to improve strength, balance, activity tolerance which will increase independence and increase safety · . Use of outcome tool(s) and clinical judgement create a POC that gives a: Clear prediction of patient's progress: LOW COMPLEXITY  
  
 
TREATMENT:  
(In addition to Assessment/Re-Assessment sessions the following treatments were rendered) Pre-treatment Symptoms/Complaints:  \"I can try\" Pain: Initial:  
Pain Intensity 1: 0  Post Session:  0/10 Therapeutic Activity: (   24 Minutes): Therapeutic activities including Chair transfers, Ambulation on level ground and standing dynamic activities, and activity pacing techniques to improve mobility, strength, balance and activity tolerance. Required minimal Verbal cues; Safety awareness training to promote dynamic balance in standing. Therapeutic Exercise: ( ):  Exercises per grid below to improve mobility, strength and balance. Required minimal visual and verbal cues to promote proper body mechanics and exercise form. Progressed repetitions and complexity of movement as indicated. Date: 
9/18/19 Date: 
9/19/19 Date: Activity/Exercise Parameters Parameters Parameters LAQ 15x AB 2 x 20 AB Seated marching 15x AB 1 x 20 AB   
 Ankle pumps 15x AB 1 x 20 AB Seated hip aBd 15x AB 1 x 10 AB Shoulder scaption 10x AB    
UE anterior punches 10x AB Biceps  10x AB Triceps  10x AB Braces/Orthotics/Lines/Etc:  
· none Treatment/Session Assessment:   
· Response to Treatment: good progress continued with distance, activity tolerance, exercises · Interdisciplinary Collaboration:  
o Physical Therapist 
o Registered Nurse · After treatment position/precautions:  
o Up in chair 
o Bed/Chair-wheels locked 
o Bed in low position 
o Call light within reach 
o Family at bedside · Compliance with Program/Exercises: Will assess as treatment progresses · Recommendations/Intent for next treatment session: \"Next visit will focus on advancements to more challenging activities and reduction in assistance provided\". Total Treatment Duration: PT Patient Time In/Time Out Time In: 7877 Time Out: 1135 Carlos Lugo, PT

## 2019-09-19 NOTE — DIABETES MGMT
Patient admitted with ARF. Blood glucose range yesterday 165-399 with pt receiving Lantus 50 units and Humalog 46 units. This AM blood glucose 184. Pt in recliner this AM. Pt states she was started taking insulin for her diabetes around \"9550. \" Pt voices a positive family history of diabetes. Pt states she does have a working glucometer at home with supplies. Per pt she only checks her blood sugar \"once a day in the morning. \" Pt states \"it hurts so I don't check it any more than that. \" Pt states she currently taking Lantus 50 units at night, Victoza 1.8mg daily, and Lantus 10 units in the morning \"if my blood sugar is high. \" Pt verbalizes she mostly just takes Lantus 50 units at night and her Victoza daily. Pt uses insulin pens at home. 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 Encouraged pt to try using a magnifying glass to help patient see the numbers on the insulin pen. Pt does state that her grandchildren will help her check the number of units on the insulin pen, but that at this time she can see it with her \"4.0 glasses. \" Pt is unsure if she has had formal diabetic education in the past. 
 
Reviewed basic pathophysiology with patient. Discussed long term and short term complications of diabetes. Educated pt re: current basal/bolus regimen of Lantus 50 units nightly and Humalog 10 units with meals and SSI including type of insulin, timing with meals, onset, duration of effect, and peak of insulin dose. Reviewed the difference between sliding scale and prandial insulin. Pt verbalizes understanding. Reviewed signs, symptoms, and treatment of hyperglycemia. Given pt reluctance to checking FSBS more often discussed continual glucose monitoring. Pt states \"that would be wonderful. \" Encouraged pt to discuss this with her PCP and see if her insurance would cover it. Pt verbalized understanding.  Discussed pt diet. Pt states she drinks water, juice, and occasionally regular soda. Pt states \"I don't like diet. \" Pt does state that she uses artificial sweetener. Pt states she typically eats lunch and dinner. Pt will eat out with her aide she gives examples such as [employer] and the [employer]. Discussed the basics of a diabetic diet. Encouraged compliance with discharge regimen and follow up with PCP for further titration of regimen. Per chart review pt awaiting precert for dc to [facility] as pt was recently there prior to admission. Pt voices no further questions regarding diabetes management at this time.

## 2019-09-19 NOTE — PROGRESS NOTES
Hourly rounds completed this shift. Patient resting in bed. All needs met at this time. PRN medication for cough administered two times this shift. Patient states improvement in cough. Will continue to monitor and give report to oncoming RN.

## 2019-09-19 NOTE — PROGRESS NOTES
Interdisciplinary Rounds completed 09/19/19. Nursing, Case Management, Physician and PT present. Plan of care reviewed and updated. Pt denied rehab.   Probable d/c in am.

## 2019-09-20 ENCOUNTER — PATIENT OUTREACH (OUTPATIENT)
Dept: CASE MANAGEMENT | Age: 79
End: 2019-09-20

## 2019-09-20 VITALS
DIASTOLIC BLOOD PRESSURE: 63 MMHG | OXYGEN SATURATION: 95 % | WEIGHT: 187 LBS | BODY MASS INDEX: 34.41 KG/M2 | HEIGHT: 62 IN | RESPIRATION RATE: 20 BRPM | SYSTOLIC BLOOD PRESSURE: 130 MMHG | TEMPERATURE: 99.1 F | HEART RATE: 70 BPM

## 2019-09-20 LAB
ANION GAP SERPL CALC-SCNC: 3 MMOL/L (ref 7–16)
BUN SERPL-MCNC: 25 MG/DL (ref 8–23)
CALCIUM SERPL-MCNC: 8.5 MG/DL (ref 8.3–10.4)
CHLORIDE SERPL-SCNC: 108 MMOL/L (ref 98–107)
CO2 SERPL-SCNC: 29 MMOL/L (ref 21–32)
CREAT SERPL-MCNC: 1.58 MG/DL (ref 0.6–1)
ERYTHROCYTE [DISTWIDTH] IN BLOOD BY AUTOMATED COUNT: 15.3 % (ref 11.9–14.6)
GLUCOSE BLD STRIP.AUTO-MCNC: 133 MG/DL (ref 65–100)
GLUCOSE SERPL-MCNC: 136 MG/DL (ref 65–100)
HCT VFR BLD AUTO: 26.6 % (ref 35.8–46.3)
HGB BLD-MCNC: 8.2 G/DL (ref 11.7–15.4)
MCH RBC QN AUTO: 28.4 PG (ref 26.1–32.9)
MCHC RBC AUTO-ENTMCNC: 30.8 G/DL (ref 31.4–35)
MCV RBC AUTO: 92 FL (ref 79.6–97.8)
NRBC # BLD: 0 K/UL (ref 0–0.2)
PLATELET # BLD AUTO: 241 K/UL (ref 150–450)
PMV BLD AUTO: 10.1 FL (ref 9.4–12.3)
POTASSIUM SERPL-SCNC: 4.2 MMOL/L (ref 3.5–5.1)
RBC # BLD AUTO: 2.89 M/UL (ref 4.05–5.2)
SODIUM SERPL-SCNC: 140 MMOL/L (ref 136–145)
WBC # BLD AUTO: 9.7 K/UL (ref 4.3–11.1)

## 2019-09-20 PROCEDURE — 96372 THER/PROPH/DIAG INJ SC/IM: CPT

## 2019-09-20 PROCEDURE — 74011250636 HC RX REV CODE- 250/636: Performed by: HOSPITALIST

## 2019-09-20 PROCEDURE — 80048 BASIC METABOLIC PNL TOTAL CA: CPT

## 2019-09-20 PROCEDURE — 74011250637 HC RX REV CODE- 250/637: Performed by: NURSE PRACTITIONER

## 2019-09-20 PROCEDURE — 82962 GLUCOSE BLOOD TEST: CPT

## 2019-09-20 PROCEDURE — 74011250637 HC RX REV CODE- 250/637: Performed by: HOSPITALIST

## 2019-09-20 PROCEDURE — 99218 HC RM OBSERVATION: CPT

## 2019-09-20 PROCEDURE — 74011636637 HC RX REV CODE- 636/637: Performed by: NURSE PRACTITIONER

## 2019-09-20 PROCEDURE — 36415 COLL VENOUS BLD VENIPUNCTURE: CPT

## 2019-09-20 PROCEDURE — 85027 COMPLETE CBC AUTOMATED: CPT

## 2019-09-20 RX ORDER — INSULIN LISPRO 100 [IU]/ML
15 INJECTION, SOLUTION INTRAVENOUS; SUBCUTANEOUS
Qty: 1 VIAL | Refills: 0 | Status: ON HOLD | OUTPATIENT
Start: 2019-09-20 | End: 2021-01-01 | Stop reason: SDUPTHER

## 2019-09-20 RX ORDER — PEN NEEDLE, DIABETIC 30 GX3/16"
NEEDLE, DISPOSABLE MISCELLANEOUS
Qty: 1 PACKAGE | Refills: 11 | Status: SHIPPED | OUTPATIENT
Start: 2019-09-20

## 2019-09-20 RX ADMIN — BENZONATATE 100 MG: 100 CAPSULE ORAL at 06:02

## 2019-09-20 RX ADMIN — SENNOSIDES, DOCUSATE SODIUM 1 TABLET: 50; 8.6 TABLET, FILM COATED ORAL at 08:49

## 2019-09-20 RX ADMIN — INSULIN LISPRO 15 UNITS: 100 INJECTION, SOLUTION INTRAVENOUS; SUBCUTANEOUS at 08:49

## 2019-09-20 RX ADMIN — HEPARIN SODIUM 5000 UNITS: 5000 INJECTION INTRAVENOUS; SUBCUTANEOUS at 08:49

## 2019-09-20 RX ADMIN — FLUCONAZOLE 150 MG: 100 TABLET ORAL at 08:53

## 2019-09-20 RX ADMIN — METOPROLOL TARTRATE 12.5 MG: 25 TABLET ORAL at 08:49

## 2019-09-20 RX ADMIN — GABAPENTIN 100 MG: 100 CAPSULE ORAL at 08:49

## 2019-09-20 RX ADMIN — PANTOPRAZOLE SODIUM 40 MG: 40 TABLET, DELAYED RELEASE ORAL at 05:58

## 2019-09-20 NOTE — DIABETES MGMT
Patient admitted with ARF. Blood glucose range yesterday 175-283 with pt receiving Lantus 55 units and Humalog 53 units. This AM blood glucose 133. Pt to discharge home today. Educated pt re: current basal/bolus regimen of Lantus 55 units nightly and Humalog 15 units with meals and SSI including type of insulin, timing with meals, onset, duration of effect, and peak of insulin dose. Educated pt on the difference between SSI and prandial insulin. Pt verbalizes understanding. Discussed the importance of FSBS checks encouraged frequency of ACHS, to record in log book, and take to PCP appointment. Pt given educational handout regarding CGM as pt admits to not checking blood glucose levels because \"it hurts. \" Encouraged pt to discuss CGM with PCP. Pt verbalized understanding. Encouraged compliance with discharge regimen. Encouraged patient to continue to work on lifestyle modifications and to follow up with PCP for further titration of regimen. Pt prefers insulin pens. Patient verbalized understanding and voices no questions at this time.

## 2019-09-20 NOTE — Clinical Note
See note - patient will need assignment of RN [facility]

## 2019-09-20 NOTE — PROGRESS NOTES
This note will not be viewable in [address] Transition of Care Discharge Follow-up Questionnaire Date/Time of Call: 9/20/19     410p What was the patient hospitalized for? 9/16/2019 - 9/20/2019  ARenal Failure Does the patient understand his/her diagnosis and/or treatment and what happened during the hospitalization? Yes, pt states was dehydrated not drinking enough sick and had no appetite. Pt reports appetite has improved. Did the patient receive discharge instructions? Yes  
CM Assessed Risk for Readmission:  
 
Patient stated Risk for Readmission: Pt lives alone. No concerns. Review any discharge instructions (see discharge instructions/AVS in [facility]). Ask patient if they understand these. Do they have any questions? DC Instructions reviewed w/ pt, no questions. Were home services ordered (nursing, PT, OT, ST, etc.)? [facility] If so, has the first visit occurred? If not, why? (Assist with coordination of services if necessary.) Dc today Was any DME ordered? No, has rollator If so, has it been received? If not, why?  (Assist patient in obtaining DME orders &/or equipment if necessary. ) NA Complete a review of all medications (new, continued and discontinued meds per the D/C instructions and medication tab in [facility]). Medications reviewed w/ pt. Able to state correct dose for insulin, gives own shots. Pt states she has food, family gets her groceries and meds, pt does not drive. Were all new prescriptions filled? If not, why?  (Assist patient in obtaining medications if necessary  escalate for CCM &/or SW if ongoing issues are verbalized by pt or anticipated) Yes Does the patient understand the purpose and dosing instructions for all medications? (If patient has questions, provide explanation and education.) yes Does the patient have any problems in performing ADLs? (If patient is unable to perform ADLs  what is the limiting factor(s)?   Do they have a support system that can assist? If no support system is present, discuss possible assistance that they may be able to obtain. Escalate for CCM/SW if ongoing issues are verbalized by pt or anticipated) Pt able to manage w/ rollator, and shower bench. Discussed falls precautions w/ blood thinner. Does the patient have all follow-up appointments scheduled? 7 day f/up with PCP?  
(f/up with PCP may be w/in 14 days if patient has a f/up with their specialist w/in 7 days) 7-14 day f/up with specialist?  
(or per discharge instructions) If f/up has not been made  what actions has the care coordinator made to accomplish this? Has transportation been arranged? PCP 9/24/19  [facility] 10/25/19  GI 
 
dgtr to transport. Any other questions or concerns expressed by the patient? No  
Schedule next appointment with IESHA FOREMAN Coordinator or refer to RN Case Manager/ per the workflow guidelines. When is care coordinators next follow-up call scheduled? If referred for CCM  what RN care manager was the referral assigned? RNCM scheduled f/u in 7-14d. RNCM provided pt w/ contact information for questions or concerns. JESICA Call Completed By: Tiny Bhagat, RN, BSN Community Nurse Care Manager

## 2019-09-20 NOTE — PROGRESS NOTES
This note will not be viewable in [address] Patient identified as High Risk for Readmission RRAT 32:  Admitted 9/16-9/20/2019 for Acute Renal Failure Original/preferred discharge plan was for patient to transition home via [facility] Insurance has denied this and process was reviewed twice, the second time through [id number] review. · Hospitalized 1/30-2/6 for acute metabolic encephalopathy · Hospitalized 6/19 & 7/24 for procedures to address abnormality of pancreatic duct and dilated bile duct respectively · Hospitalized 8/8-8/16 for septic shock Medical history includes · DM2 - uncontrolled · CKD Stage III · Elevated liver function tests · Pancreatitis Plan refer patient for assignment of [facility] Patient discharging today with [facility]

## 2019-09-20 NOTE — DISCHARGE SUMMARY
Discharge Summary Patient ID: 
Ann Blas [id number] 
14 y.o. 
1940 Admit date: 9/16/2019 10:55 AM 
Discharge date and time: 9/20/2019 Attending: Astrid Stock MD 
PCP:  Anibal Escobedo MD 
Treatment Team: Attending Provider: Astrid Stock MD; Utilization Review: Maricarmen Cherry RN; Occupational Therapy Assistant: Iqra Contreras; Physical Therapist: Judy Garcia DPT Principal Diagnosis ARF (acute renal failure) ([facility] Principal Problem: 
  ARF (acute renal failure) ([facility] (9/17/2019) Active Problems: 
  Essential hypertension (6/15/2015) HLD (hyperlipidemia) (6/15/2015) Gastroesophageal reflux disease without esophagitis (6/15/2015) RLS (restless legs syndrome) (6/15/2015) Acute renal failure (ARF) ([facility] (8/8/2019) Anemia (8/8/2019) Generalized weakness (9/16/2019) Decreased oral intake (9/16/2019) * Admission Diagnoses: Acute renal failure (ARF) ([facility] [N17.9] Generalized weakness [R53.1] Decreased oral intake [R63.8] ARF (acute renal failure) ([facility] [N17.9] * Discharge Diagnoses:   
Hospital Problems as of 9/20/2019 Date Reviewed: 8/10/2019 Codes Class Noted - Resolved POA * (Principal) ARF (acute renal failure) ([facility] ICD-10-CM: N17.9 ICD-9-CM: 584.9  9/17/2019 - Present Unknown Generalized weakness ICD-10-CM: R53.1 ICD-9-CM: 780.79  9/16/2019 - Present Unknown Decreased oral intake ICD-10-CM: R63.8 ICD-9-CM: 783.9  9/16/2019 - Present Unknown Acute renal failure (ARF) (HCC) ICD-10-CM: N17.9 ICD-9-CM: 584.9  8/8/2019 - Present Unknown Anemia ICD-10-CM: D64.9 ICD-9-CM: 285.9  8/8/2019 - Present Yes Essential hypertension (Chronic) ICD-10-CM: I10 
ICD-9-CM: 401.9  6/15/2015 - Present Yes HLD (hyperlipidemia) (Chronic) ICD-10-CM: C95.8 ICD-9-CM: 272.4  6/15/2015 - Present Yes  Gastroesophageal reflux disease without esophagitis (Chronic) ICD-10-CM: K21.9 ICD-9-CM: 530.81  6/15/2015 - Present Yes  
   
 RLS (restless legs syndrome) (Chronic) ICD-10-CM: L57.94 ICD-9-CM: 333.94  6/15/2015 - Present Yes Hospital Course: Ms. Celeste Lowry is a 79 yo female with PMH of CAD, DM II, HTN, peripheral neuropathy, hyperlipidemia, CKD 1, GERD, ESBL e.coli and enterococcus bacteremia following an ERCP 7/24 which she completed IV merrem and Vancomycin 9/6/19 who presented with c/o generalized weakness and poor oral intake.  She was DC home recently from [facility] doing well then pt started to progressively decline overall, lethargic, poor oral intake.  Pt was started on bactrim DS by ID recently.  Pt also started on oral iron and reported black colored stool.  Pt found to have PRATIMA with cre 3.23 (baseline around 1.04). renal US without acute findings.  Pt underwent EGD 9/17 with removal of biliary stent, findings of gastritis, instructed to hold plavix and NSAIDS X1 week.  Nephrology consulted. Urine cx with NGTD. Creatinine trending down after IVF.  A1C 9.7, BGL more controlled after insulin adjsutment. Pt c/o cough, non productive. CXR without acute findings. Pt denies STR. Will DC with daughter with HH.   Denies CP, SOB. Diagnostic Study/Procedure results summary copied from within [facility] EMR: 
3 Days Post-Op  Procedure(s): ESOPHAGOGASTRODUODENOSCOPY (EGD) STENT REPLACEMENT/ 35/ ROOM 610 ENDOSCOPY WITH PROSTHESIS OR STENT REMOVAL 
 
CHEST X-RAY, one view. 
  
HISTORY:  Persistent cough. 
  
TECHNIQUE:  AP upright portable view 
  
COMPARISON: 9 August 2019. 
  
IMPRESSION IMPRESSION: Inspiration is shallow. Minimal interstitial prominence. Heart is 
enlarged. Pulmonary vasculature is unremarkable. RENAL ULTRASOUND 9/16/2019 6:13 PM 
  
HISTORY: Acute kidney injury; acute renal failure; generalized weakness with 
decreased oral intake;  PRATIMA 
  
TECHNIQUE: Sonographic imaging of the kidneys, bladder and retroperitoneal 
 vessels was performed.  
  
COMPARISON: CT abdomen and pelvis August 8, 2019  
  
FINDINGS: The right kidney is 9.7 cm in length. The left kidney is 9.6 cm in 
length. There is no hydronephrosis. There are no visible shadowing renal 
calculi. 
  
The bladder is normal in appearance. 
  
The IVC is patent. The abdominal aorta is 1.8 cm in diameter. 
  
IMPRESSION IMPRESSION:  No hydronephrosis or acute renal findings. 
  
  
  
 
 
Labs: Results:  
   
Chemistry Recent Labs  
  09/20/19 
0704 09/19/19 
0715 09/18/19 
1555 * 175* 340*  140 137  
K 4.2 4.2 4.5  
* 107 105 CO2 29 28 27 BUN 25* 22 24* CREA 1.58* 1.64* 1.82* CA 8.5 8.3 8.1* AGAP 3* 5* 5* CBC w/Diff Recent Labs  
  09/20/19 
0704 09/19/19 
1537 09/19/19 
0715  09/18/19 
0081 WBC 9.7  --  10.5  --  7.1 RBC 2.89*  --  3.03*  --  2.95* HGB 8.2* 8.5* 8.8*   < > 8.2* HCT 26.6* 27.6* 28.2*   < > 27.2*  
  --  257  --  236  
 < > = values in this interval not displayed. Cardiac Enzymes No results for input(s): CPK, CKND1, MARIO ALBERTO in the last 72 hours. No lab exists for component: Burks Naveed Coagulation No results for input(s): PTP, INR, APTT in the last 72 hours. No lab exists for component: INREXT, INREXT Lipid Panel @BRIEFLAB(CHOL,CHOLPOCT,[id number],CHOLX,CHOLP,CHLST,CHOLV,079814,HDL,HDLPOC,HDLPOCT,303341,NHDLCT,[id number],HDLC,HDLP,LDL,LDLPOCT,LDLCPOC,623878,NLDLCT,DLDL,LDLC,DLDLP,104474,VLDLC,VLDL,TGL,TGLX,TRIGL,[id number],TRIGP,TGLPOCT,245703,864936,CHHD,CHHDX)@ BNP No results for input(s): BNPP in the last 72 hours. Liver Enzymes No results for input(s): TP, ALB, TBIL, AP, SGOT, GPT in the last 72 hours. No lab exists for component: DBIL Thyroid Studies Lab Results Component Value Date/Time TSH 1.260 01/30/2019 10:32 AM  
    
 
 
Discharge Exam: 
Visit Yessi Primes /55 (BP 1 Location: Left arm, BP Patient Position: At rest) Pulse 72 Temp 99.5 °F (37.5 °C) Resp 18 Ht 5' 2\" (1.575 m) Wt 84.8 kg (187 lb) SpO2 92% BMI 34.20 kg/m² General:       No acute distress   
Lungs:          CTA bilaterally. resp even and nonlabored Heart:            S1S2 present without murmurs rubs gallops. RRR. No LE edema Abdomen:    Soft, non tender, non distended. BS present Extremities: Moves ext spontaneously. No cyanosis.  
Neurologic:  A/O X3. No focal deficits.  
 
 
 
Disposition: [facility] Discharge Condition: stable Patient Instructions:  
Current Discharge Medication List  
  
START taking these medications Details  
insulin lispro (HUMALOG) 100 unit/mL injection 15 Units by SubCUTAneous route Before breakfast, lunch, and dinner. Qty: 1 Vial, Refills: 0 Insulin Needles, Disposable, 31 gauge x 5/16\" ndle by SubCUTAneous route Before breakfast, lunch, dinner and at bedtime. Qty: 1 Package, Refills: 11 CONTINUE these medications which have CHANGED Details  
metoprolol tartrate (LOPRESSOR) 25 mg tablet Take 0.5 Tabs by mouth every twelve (12) hours for 30 days. Qty: 30 Tab, Refills: 2  
  
pantoprazole (PROTONIX) 40 mg tablet Take 1 Tab by mouth daily for 30 days. Qty: 30 Tab, Refills: 2 CONTINUE these medications which have NOT CHANGED Details  
clopidogrel (PLAVIX) 75 mg tab Take 75 mg by mouth. insulin glargine (LANTUS) 100 unit/mL injection 50 Units by SubCUTAneous route nightly. Qty: 1 Vial, Refills: 0 Blood-Glucose Meter (ONETOUCH ULTRAMINI) monitoring kit Use as directed Qty: 1 Kit, Refills: 0 Comments: Dx: 250.00 insulin based  
  
glucose blood VI test strips (ONETOUCH ULTRA TEST) strip Test 4 times daily as directed Qty: 100 Strip, Refills: 11 Comments: Dx: 250.00 insulin based Lancets (ONETOUCH ULTRASOFT LANCETS) misc Test 4 times daily as directed Qty: 100 Each, Refills: 11 Comments: Dx: 250.00 insulin based  
  
losartan (COZAAR) 100 mg tablet Take  by mouth daily. Associated Diagnoses: CAD (coronary artery disease); HTN (hypertension)  
  
rosuvastatin (CRESTOR) 20 mg tablet Take 1 Tab by mouth nightly. Qty: 90 Tab, Refills: 1 Associated Diagnoses: CAD (coronary artery disease); HLD (hyperlipidemia)  
  
gabapentin (NEURONTIN) 100 mg capsule Take 1 Cap by mouth three (3) times daily. Qty: 30 Cap, Refills: 5 Associated Diagnoses: Peripheral neuropathy  
  
rOPINIRole (REQUIP) 0.5 mg tablet Take 1 Tab by mouth nightly as needed. Qty: 90 Tab, Refills: 1 Associated Diagnoses: RLS (restless legs syndrome) STOP taking these medications  
  
 liraglutide (VICTOZA) 0.6 mg/0.1 mL (18 mg/3 mL) pnij Comments:  
Reason for Stopping:   
   
 trimethoprim-sulfamethoxazole (BACTRIM DS) 160-800 mg per tablet Comments:  
Reason for Stopping:   
   
 Omeprazole delayed release (PRILOSEC D/R) 20 mg tablet Comments:  
Reason for Stopping:   
   
 colchicine (COLCRYS) 0.6 mg tablet Comments:  
Reason for Stopping:   
   
  
 
 
Activity: PT/OT per Home Health Diet: Diabetic Diet Wound Care: None needed Full Code Surrogate decision maker:  daughter Pneumonia and flu vaccine to be administered at discharge per hospital protocol Follow-up ·   FU PCP 2-3 days ·   FU ID as scheduled previous to admission ·  DC on protonix, iron supplement · Hold bactrim · DC on new insulin regimen · FU GI as scheduled Notes, labs, VS, diagnostic testing reviewed Case discussed with pt, care team, Dr. Alanna Vieira Time spent to discharge patient 45 min Signed: 
Bravo Forde NP 
9/20/2019 
8:31 AM

## 2019-09-20 NOTE — PROGRESS NOTES
Patient to discharge home with [facility]. liaisons notified of discharge. Care Management Interventions PCP Verified by CM: Yes Mode of Transport at Discharge: Other (see comment) Transition of Care Consult (CM Consult): Discharge Planning, Home Health [address]: Yes Discharge Durable Medical Equipment: No 
Physical Therapy Consult: Yes Occupational Therapy Consult: Yes Current Support Network: Own Home Confirm Follow Up Transport: Family Plan discussed with Pt/Family/Caregiver: Yes Freedom of Choice Offered: Yes Discharge Location Discharge Placement: Home with home health

## 2019-09-22 ENCOUNTER — HOME CARE VISIT (OUTPATIENT)
Dept: SCHEDULING | Facility: HOME HEALTH | Age: 79
End: 2019-09-22

## 2019-09-26 ENCOUNTER — APPOINTMENT (OUTPATIENT)
Dept: GENERAL RADIOLOGY | Age: 79
End: 2019-09-26
Attending: EMERGENCY MEDICINE
Payer: MEDICARE

## 2019-09-26 ENCOUNTER — HOSPITAL ENCOUNTER (EMERGENCY)
Age: 79
Discharge: HOME | End: 2019-09-26
Attending: EMERGENCY MEDICINE
Payer: MEDICARE

## 2019-09-26 VITALS
WEIGHT: 189 LBS | RESPIRATION RATE: 16 BRPM | HEIGHT: 62 IN | HEART RATE: 78 BPM | TEMPERATURE: 97.8 F | BODY MASS INDEX: 34.78 KG/M2 | OXYGEN SATURATION: 92 % | SYSTOLIC BLOOD PRESSURE: 164 MMHG | DIASTOLIC BLOOD PRESSURE: 87 MMHG

## 2019-09-26 DIAGNOSIS — E86.0 DEHYDRATION: Primary | ICD-10-CM

## 2019-09-26 DIAGNOSIS — E11.9 TYPE 2 DIABETES MELLITUS WITHOUT COMPLICATION, UNSPECIFIED WHETHER LONG TERM INSULIN USE (HCC): ICD-10-CM

## 2019-09-26 DIAGNOSIS — I10 HYPERTENSION, UNSPECIFIED TYPE: ICD-10-CM

## 2019-09-26 DIAGNOSIS — R63.0 POOR APPETITE: ICD-10-CM

## 2019-09-26 DIAGNOSIS — N30.01 ACUTE CYSTITIS WITH HEMATURIA: ICD-10-CM

## 2019-09-26 LAB
ALBUMIN SERPL-MCNC: 3.1 G/DL (ref 3.2–4.6)
ALBUMIN/GLOB SERPL: 0.6 {RATIO} (ref 1.2–3.5)
ALP SERPL-CCNC: 173 U/L (ref 50–136)
ALT SERPL-CCNC: 19 U/L (ref 12–65)
ANION GAP SERPL CALC-SCNC: 3 MMOL/L (ref 7–16)
AST SERPL-CCNC: 11 U/L (ref 15–37)
BACTERIA URNS QL MICRO: ABNORMAL /HPF
BASOPHILS # BLD: 0.1 K/UL (ref 0–0.2)
BASOPHILS NFR BLD: 1 % (ref 0–2)
BILIRUB SERPL-MCNC: 0.4 MG/DL (ref 0.2–1.1)
BUN SERPL-MCNC: 30 MG/DL (ref 8–23)
CALCIUM SERPL-MCNC: 9.6 MG/DL (ref 8.3–10.4)
CASTS URNS QL MICRO: ABNORMAL /LPF
CHLORIDE SERPL-SCNC: 98 MMOL/L (ref 98–107)
CO2 SERPL-SCNC: 36 MMOL/L (ref 21–32)
CREAT SERPL-MCNC: 1.95 MG/DL (ref 0.6–1)
DIFFERENTIAL METHOD BLD: ABNORMAL
EOSINOPHIL # BLD: 0.1 K/UL (ref 0–0.8)
EOSINOPHIL NFR BLD: 1 % (ref 0.5–7.8)
EPI CELLS #/AREA URNS HPF: ABNORMAL /HPF
ERYTHROCYTE [DISTWIDTH] IN BLOOD BY AUTOMATED COUNT: 15.3 % (ref 11.9–14.6)
GLOBULIN SER CALC-MCNC: 5.2 G/DL (ref 2.3–3.5)
GLUCOSE SERPL-MCNC: 291 MG/DL (ref 65–100)
HCT VFR BLD AUTO: 33.6 % (ref 35.8–46.3)
HGB BLD-MCNC: 10.5 G/DL (ref 11.7–15.4)
IMM GRANULOCYTES # BLD AUTO: 0 K/UL (ref 0–0.5)
IMM GRANULOCYTES NFR BLD AUTO: 0 % (ref 0–5)
LYMPHOCYTES # BLD: 1.9 K/UL (ref 0.5–4.6)
LYMPHOCYTES NFR BLD: 21 % (ref 13–44)
MCH RBC QN AUTO: 28.5 PG (ref 26.1–32.9)
MCHC RBC AUTO-ENTMCNC: 31.3 G/DL (ref 31.4–35)
MCV RBC AUTO: 91.3 FL (ref 79.6–97.8)
MONOCYTES # BLD: 0.7 K/UL (ref 0.1–1.3)
MONOCYTES NFR BLD: 8 % (ref 4–12)
NEUTS SEG # BLD: 6.2 K/UL (ref 1.7–8.2)
NEUTS SEG NFR BLD: 69 % (ref 43–78)
NRBC # BLD: 0 K/UL (ref 0–0.2)
PLATELET # BLD AUTO: 394 K/UL (ref 150–450)
PMV BLD AUTO: 10 FL (ref 9.4–12.3)
POTASSIUM SERPL-SCNC: 5.4 MMOL/L (ref 3.5–5.1)
PROT SERPL-MCNC: 8.3 G/DL (ref 6.3–8.2)
RBC # BLD AUTO: 3.68 M/UL (ref 4.05–5.2)
RBC #/AREA URNS HPF: ABNORMAL /HPF
SODIUM SERPL-SCNC: 137 MMOL/L (ref 136–145)
WBC # BLD AUTO: 9 K/UL (ref 4.3–11.1)
WBC URNS QL MICRO: ABNORMAL /HPF

## 2019-09-26 PROCEDURE — 71046 X-RAY EXAM CHEST 2 VIEWS: CPT

## 2019-09-26 PROCEDURE — 81003 URINALYSIS AUTO W/O SCOPE: CPT | Performed by: EMERGENCY MEDICINE

## 2019-09-26 PROCEDURE — 87086 URINE CULTURE/COLONY COUNT: CPT

## 2019-09-26 PROCEDURE — 99284 EMERGENCY DEPT VISIT MOD MDM: CPT | Performed by: EMERGENCY MEDICINE

## 2019-09-26 PROCEDURE — 85025 COMPLETE CBC W/AUTO DIFF WBC: CPT

## 2019-09-26 PROCEDURE — 80053 COMPREHEN METABOLIC PANEL: CPT

## 2019-09-26 PROCEDURE — 81015 MICROSCOPIC EXAM OF URINE: CPT

## 2019-09-26 RX ORDER — CEPHALEXIN 500 MG/1
500 CAPSULE ORAL 3 TIMES DAILY
Qty: 21 CAP | Refills: 0 | Status: SHIPPED | OUTPATIENT
Start: 2019-09-26 | End: 2019-10-03

## 2019-09-26 NOTE — ED NOTES
I have reviewed discharge instructions with the patient. The patient verbalized understanding. Patient left ED via Discharge Method: wheelchair to Home with daughter. Opportunity for questions and clarification provided. Patient given 1 scripts. To continue your aftercare when you leave the hospital, you may receive an automated call from our care team to check in on how you are doing. This is a free service and part of our promise to provide the best care and service to meet your aftercare needs.  If you have questions, or wish to unsubscribe from this service please call [phone]. Thank you for Choosing our [facility] Emergency Department.

## 2019-09-26 NOTE — DISCHARGE INSTRUCTIONS
Follow-up with your doctor, call the office to make an appointment. Return to the emergency department if your symptoms worsen. Be sure to drink plenty of fluids.

## 2019-09-26 NOTE — ED PROVIDER NOTES
Patient has a history of hypertension, CAD, type 2 diabetes who was admitted in August here. She had an ERCP and developed sepsis afterwards. She was discharged to a nursing home after that. She went home and then came back on September 16 and was admitted for dehydration. She went home on the 20th doing better. The patient now states that she has been feeling very weak and lethargic and not eating or drinking for the past couple days. She denies any abdominal pain, denies any vomiting or diarrhea. She states she has been urinating normally. Past Medical History:  
Diagnosis Date  Acute cystitis without hematuria 1/30/2019  CAD (coronary artery disease)  DM2 (diabetes mellitus, type 2) ([facility]  Gout  HLD (hyperlipidemia)  HTN (hypertension)  Peripheral neuropathy Past Surgical History:  
Procedure Laterality Date  CARDIAC SURG PROCEDURE UNLIST    
 stents x 3 2009  HX CHOLECYSTECTOMY jennifer in 1964  HX CORONARY ARTERY BYPASS GRAFT    
 x3  
 HX TUBAL LIGATION Family History:  
Problem Relation Age of Onset  Hypertension Mother  Cancer Mother  Diabetes Mother  Heart Disease Mother Social History Socioeconomic History  Marital status: SINGLE Spouse name: Not on file  Number of children: Not on file  Years of education: Not on file  Highest education level: Not on file Occupational History  Occupation: retired  Social Needs  Financial resource strain: Not on file  Food insecurity:  
  Worry: Not on file Inability: Not on file  Transportation needs:  
  Medical: Not on file Non-medical: Not on file Tobacco Use  Smoking status: Never Smoker  Smokeless tobacco: Never Used Substance and Sexual Activity  Alcohol use: Yes Comment: socially  Drug use: No  
 Sexual activity: Not on file Lifestyle  Physical activity:  
  Days per week: Not on file Minutes per session: Not on file  Stress: Not on file Relationships  Social connections:  
  Talks on phone: Not on file Gets together: Not on file Attends Baptism service: Not on file Active member of club or organization: Not on file Attends meetings of clubs or organizations: Not on file Relationship status: Not on file  Intimate partner violence:  
  Fear of current or ex partner: Not on file Emotionally abused: Not on file Physically abused: Not on file Forced sexual activity: Not on file Other Topics Concern  Not on file Social History Narrative Lives alone but has a caregiver who helps several hours per day, several days per week ALLERGIES: Sulfa (sulfonamide antibiotics) Review of Systems Constitutional: Positive for appetite change. Negative for chills and fever. Gastrointestinal: Negative for nausea and vomiting. All other systems reviewed and are negative. Vitals:  
 09/26/19 1322 BP: 131/72 Pulse: 82 Resp: 16 Temp: 98.2 °F (36.8 °C) SpO2: 90% Weight: 85.7 kg (189 lb) Height: 5' 2\" (1.575 m) Physical Exam  
Constitutional: She is oriented to person, place, and time. She appears well-developed and well-nourished. HENT:  
Head: Normocephalic and atraumatic. Eyes: Pupils are equal, round, and reactive to light. Conjunctivae are normal.  
Neck: Normal range of motion. Neck supple. Cardiovascular: Normal rate and regular rhythm. Pulmonary/Chest: Effort normal. No respiratory distress. Abdominal: Soft. She exhibits no distension. Musculoskeletal: She exhibits no edema or tenderness. Neurological: She is alert and oriented to person, place, and time. Skin: Skin is warm and dry. Psychiatric: She has a normal mood and affect. Her behavior is normal.  
Nursing note and vitals reviewed. MDM Number of Diagnoses or Management Options Dehydration: new and does not require workup Hypertension, unspecified type:  
Poor appetite: new and does not require workup Type 2 diabetes mellitus without complication, unspecified whether long term insulin use [facility]):  
Diagnosis management comments: 4:38 PM discussed results with patient, unremarkable labs. She does have some underlying renal insufficiency which is only slightly worse than her baseline. Plan is to give her some IV hydration and discharge her home. Amount and/or Complexity of Data Reviewed Clinical lab tests: ordered and reviewed Decide to obtain previous medical records or to obtain history from someone other than the patient: yes Obtain history from someone other than the patient: yes Review and summarize past medical records: yes Risk of Complications, Morbidity, and/or Mortality Presenting problems: moderate Diagnostic procedures: moderate Management options: moderate Patient Progress Patient progress: improved Procedures

## 2019-09-26 NOTE — ED TRIAGE NOTES
Pt was recently discharged with sepsis from this facility and states she feels \"sick\". Decreased appetite at home and feeling more weak. VSS in triage. Pt supposed to wear O2 at home but does not. Some cough with yellow sputum.

## 2019-09-29 LAB
BACTERIA SPEC CULT: NORMAL
SERVICE CMNT-IMP: NORMAL

## 2019-10-07 ENCOUNTER — PATIENT OUTREACH (OUTPATIENT)
Dept: CASE MANAGEMENT | Age: 79
End: 2019-10-07

## 2019-10-07 NOTE — PROGRESS NOTES
This note will not be viewable in [address] Transitions of Care  Follow up Outreach Note Outreach type  Call  
Date/Time of Outreach: 10/7/19    115p Has patient attended PCP or specialist follow-up appointments since last contact? What was outcome of appointment? When is next follow-up scheduled? 9/26 ED feeling weak (IP 9/16/2019 - 9/20/2019  ARenal Failure not drinking enough per pt) RNCM spoke briefly w/ pt who states she still hasnt gotten her appetite back. She reports following up w/ [facility] PCP 1wk ago. She is currently at doctors office and requests to call this RNCM back, RNCM agrees. Upcoming Appts: 
10/7/19 ID 
11/22 Cards Review medications. Any medication changes since last outreach? Does patient have any questions or issues related to their medications? Humalog 15u ac Home health active? If yes  any issue? Progress? Yeni Referrals needed? 
(CM, SW, HH, etc.) No  
Other issues/Miscellaneous? (Transportation, access to meals, ability to perform ADLs, adequate caregiver support, etc.) No, dgtr assists w/ transportation Next Outreach Scheduled? Graduation from program? NA No   
 Next Steps/Goals (if applicable): RNCM will outreach pt again if no call back received. Pt continues to be HRRA d/t age co moribities. Outreach completed by:   Princess Christiansen, RN, BSN

## 2019-10-24 ENCOUNTER — PATIENT OUTREACH (OUTPATIENT)
Dept: CASE MANAGEMENT | Age: 79
End: 2019-10-24

## 2019-10-24 NOTE — PROGRESS NOTES
This note will not be viewable in [address] Transitions of Care  Follow up Outreach Note Outreach type  Call  
Date/Time of Outreach: 10/24/19     1053a Has patient attended PCP or specialist follow-up appointments since last contact? What was outcome of appointment? When is next follow-up scheduled? Pt reports usually doesnt have s/s of UTI, seen by PCP yesterday w/ + urinalysis, burning. Discussed increasing liquid po intake. Upcoming Appts: 
10/30/19  [facility] PCP f/u UTI.  
11/22 Cards Review medications. Any medication changes since last outreach? Does patient have any questions or issues related to their medications? Pt reports taking Lantus 55u every night. If >200 before each meal, pt reports she takes 5u-15u novolog 248 yesterday Reports taking Victoza. Pt reports antibiotic prescribed by PCP yesterday, aide to pick on for pt today. Home health active? If yes  any issue? Progress? FAY Jaime and PT will dc soon. [facility]  4d 5hrs, Fri 4hrs. /sat/Sun 2hrs. Referrals needed? 
(CM, SW, HH, etc.) No  
Other issues/Miscellaneous? (Transportation, access to meals, ability to perform ADLs, adequate caregiver support, etc.) No, dgtr assists w/ transportation Next Outreach Scheduled? Graduation from program? Yes No   
 Next Steps/Goals (if applicable): RNCM scheduled outreach 7-14d for f/u UTI. Pt continues to be HRRA d/t age co moribities. Outreach completed by:   Doris Richmond, RN, BSN

## 2019-11-14 ENCOUNTER — PATIENT OUTREACH (OUTPATIENT)
Dept: CASE MANAGEMENT | Age: 79
End: 2019-11-14

## 2019-11-14 NOTE — PROGRESS NOTES
This note will not be viewable in [address] RNCM attempted to reach pt for f/u, left vm at home and dgtr mobile. Will continue attempts to reach pt.

## 2019-11-21 ENCOUNTER — PATIENT OUTREACH (OUTPATIENT)
Dept: CASE MANAGEMENT | Age: 79
End: 2019-11-21

## 2019-11-21 NOTE — PROGRESS NOTES
This note will not be viewable in [address] RNCM spoke briefly w/ dgtr who states she is driving and will arrive to pt home shortly, request to call RNCM back when she arrives. RNCM agreed, however call not returned. Transitions of Care  Follow up Outreach Note Outreach type  Call  
Date/Time of Outreach: 11/25/19     1035 Has patient attended PCP or specialist follow-up appointments since last contact? What was outcome of appointment? When is next follow-up scheduled? (IP 9/16/2019 - 9/20/2019  ARenal Failure not drinking enough per pt) ID 10/7 Polymicrobial bacteremia: Enterococcus faecalis and ESBL E.coli (8/8), source likely biliary. TTE negative · ESBL E.coli/Enterococcus faecium UTI, asymptomatic, UA benign, likely colonization Upcoming Appts:  
12/2/19 pcp Review medications. Any medication changes since last outreach? Does patient have any questions or issues related to their medications? Completed anbxs, no changes. No questions or issues. Home health active? If yes  any issue? Progress? FAY Jaime and PT  
[facility]  4d 5hrs, Fri 4hrs. /sat/Sun 2hrs. Referrals needed? 
(CM, SW, HH, etc.) No  
Other issues/Miscellaneous? (Transportation, access to meals, ability to perform ADLs, adequate caregiver support, etc.) No, dgtr assists w/ transportation. [facility] Next Outreach Scheduled? Graduation from program? NA Yes Next Steps/Goals (if applicable): Pt understands dx, s/s infection. RNCM will graduate at this time. Pt continues to be HRRA d/t age co moribities. Outreach completed by:   Lana hSelby, RN, BSN

## 2020-01-01 ENCOUNTER — APPOINTMENT (OUTPATIENT)
Dept: GENERAL RADIOLOGY | Age: 80
DRG: 004 | End: 2020-01-01
Attending: INTERNAL MEDICINE
Payer: MEDICARE

## 2020-01-01 ENCOUNTER — HOSPITAL ENCOUNTER (INPATIENT)
Age: 80
LOS: 74 days | Discharge: NURSING, REHAB OR LONG-TERM CARE | DRG: 004 | End: 2021-03-08
Attending: EMERGENCY MEDICINE | Admitting: INTERNAL MEDICINE
Payer: MEDICARE

## 2020-01-01 ENCOUNTER — APPOINTMENT (OUTPATIENT)
Dept: GENERAL RADIOLOGY | Age: 80
DRG: 004 | End: 2020-01-01
Attending: ANESTHESIOLOGY
Payer: MEDICARE

## 2020-01-01 ENCOUNTER — APPOINTMENT (OUTPATIENT)
Dept: GENERAL RADIOLOGY | Age: 80
DRG: 004 | End: 2020-01-01
Attending: EMERGENCY MEDICINE
Payer: MEDICARE

## 2020-01-01 DIAGNOSIS — N30.00 ACUTE CYSTITIS WITHOUT HEMATURIA: ICD-10-CM

## 2020-01-01 DIAGNOSIS — E87.5 ACUTE HYPERKALEMIA: ICD-10-CM

## 2020-01-01 DIAGNOSIS — R41.82 ALTERED MENTAL STATUS, UNSPECIFIED ALTERED MENTAL STATUS TYPE: ICD-10-CM

## 2020-01-01 DIAGNOSIS — R06.00 DYSPNEA, UNSPECIFIED TYPE: ICD-10-CM

## 2020-01-01 DIAGNOSIS — E11.65 UNCONTROLLED TYPE 2 DIABETES MELLITUS WITH HYPERGLYCEMIA (HCC): Chronic | ICD-10-CM

## 2020-01-01 DIAGNOSIS — J80 ACUTE RESPIRATORY DISTRESS SYNDROME (ARDS) DUE TO SEVERE ACUTE RESPIRATORY SYNDROME CORONAVIRUS 2 (SARS-COV-2) (HCC): ICD-10-CM

## 2020-01-01 DIAGNOSIS — N17.9 AKI (ACUTE KIDNEY INJURY) (HCC): ICD-10-CM

## 2020-01-01 DIAGNOSIS — N17.9 ACUTE RENAL FAILURE SUPERIMPOSED ON CHRONIC KIDNEY DISEASE, UNSPECIFIED CKD STAGE, UNSPECIFIED ACUTE RENAL FAILURE TYPE (HCC): ICD-10-CM

## 2020-01-01 DIAGNOSIS — K85.90 ACUTE PANCREATITIS, UNSPECIFIED COMPLICATION STATUS, UNSPECIFIED PANCREATITIS TYPE: ICD-10-CM

## 2020-01-01 DIAGNOSIS — R53.81 DEBILITY: ICD-10-CM

## 2020-01-01 DIAGNOSIS — J96.01 ACUTE RESPIRATORY FAILURE WITH HYPOXIA (HCC): Primary | ICD-10-CM

## 2020-01-01 DIAGNOSIS — J96.01 ACUTE HYPOXEMIC RESPIRATORY FAILURE (HCC): ICD-10-CM

## 2020-01-01 DIAGNOSIS — I46.9 CARDIAC ARREST (HCC): ICD-10-CM

## 2020-01-01 DIAGNOSIS — N39.0 URINARY TRACT INFECTION WITHOUT HEMATURIA, SITE UNSPECIFIED: ICD-10-CM

## 2020-01-01 DIAGNOSIS — E46 HYPOALBUMINEMIA DUE TO PROTEIN-CALORIE MALNUTRITION (HCC): ICD-10-CM

## 2020-01-01 DIAGNOSIS — R54 FRAILTY: ICD-10-CM

## 2020-01-01 DIAGNOSIS — E11.00 DIABETES MELLITUS WITH HYPEROSMOLARITY WITHOUT HYPERGLYCEMIC HYPEROSMOLAR NONKETOTIC COMA (HCC): Chronic | ICD-10-CM

## 2020-01-01 DIAGNOSIS — E88.09 HYPOALBUMINEMIA DUE TO PROTEIN-CALORIE MALNUTRITION (HCC): ICD-10-CM

## 2020-01-01 DIAGNOSIS — G93.49 ENCEPHALOPATHY DUE TO COVID-19 VIRUS: ICD-10-CM

## 2020-01-01 DIAGNOSIS — U07.1 ENCEPHALOPATHY DUE TO COVID-19 VIRUS: ICD-10-CM

## 2020-01-01 DIAGNOSIS — G93.40 ENCEPHALOPATHY: ICD-10-CM

## 2020-01-01 DIAGNOSIS — I25.810 CORONARY ARTERY DISEASE INVOLVING CORONARY BYPASS GRAFT OF NATIVE HEART WITHOUT ANGINA PECTORIS: Chronic | ICD-10-CM

## 2020-01-01 DIAGNOSIS — R40.1 STUPOR: ICD-10-CM

## 2020-01-01 DIAGNOSIS — E66.01 MORBID OBESITY (HCC): ICD-10-CM

## 2020-01-01 DIAGNOSIS — Z71.89 ACP (ADVANCE CARE PLANNING): ICD-10-CM

## 2020-01-01 DIAGNOSIS — D64.9 ANEMIA, UNSPECIFIED TYPE: ICD-10-CM

## 2020-01-01 DIAGNOSIS — U07.1 COVID-19: ICD-10-CM

## 2020-01-01 DIAGNOSIS — N18.9 ACUTE RENAL FAILURE SUPERIMPOSED ON CHRONIC KIDNEY DISEASE, UNSPECIFIED CKD STAGE, UNSPECIFIED ACUTE RENAL FAILURE TYPE (HCC): ICD-10-CM

## 2020-01-01 DIAGNOSIS — Z51.5 ENCOUNTER FOR PALLIATIVE CARE: ICD-10-CM

## 2020-01-01 DIAGNOSIS — J96.01 ACUTE RESPIRATORY FAILURE WITH HYPOXEMIA (HCC): ICD-10-CM

## 2020-01-01 DIAGNOSIS — U07.1 ACUTE RESPIRATORY DISTRESS SYNDROME (ARDS) DUE TO SEVERE ACUTE RESPIRATORY SYNDROME CORONAVIRUS 2 (SARS-COV-2) (HCC): ICD-10-CM

## 2020-01-01 DIAGNOSIS — N17.9 ACUTE RENAL FAILURE, UNSPECIFIED ACUTE RENAL FAILURE TYPE (HCC): ICD-10-CM

## 2020-01-01 DIAGNOSIS — J18.9 COMMUNITY ACQUIRED PNEUMONIA, UNSPECIFIED LATERALITY: ICD-10-CM

## 2020-01-01 LAB
ABO + RH BLD: NORMAL
ALBUMIN SERPL-MCNC: 2.3 G/DL (ref 3.2–4.6)
ALBUMIN SERPL-MCNC: 2.8 G/DL (ref 3.2–4.6)
ALBUMIN/GLOB SERPL: 0.6 {RATIO} (ref 1.2–3.5)
ALBUMIN/GLOB SERPL: 0.6 {RATIO} (ref 1.2–3.5)
ALP SERPL-CCNC: 197 U/L (ref 50–136)
ALP SERPL-CCNC: 90 U/L (ref 50–136)
ALT SERPL-CCNC: 20 U/L (ref 12–65)
ALT SERPL-CCNC: 35 U/L (ref 12–65)
ANION GAP SERPL CALC-SCNC: 4 MMOL/L (ref 7–16)
ANION GAP SERPL CALC-SCNC: 5 MMOL/L (ref 7–16)
ANION GAP SERPL CALC-SCNC: 6 MMOL/L (ref 7–16)
ANION GAP SERPL CALC-SCNC: 6 MMOL/L (ref 7–16)
ANION GAP SERPL CALC-SCNC: 7 MMOL/L (ref 7–16)
ANION GAP SERPL CALC-SCNC: 7 MMOL/L (ref 7–16)
ANION GAP SERPL CALC-SCNC: 8 MMOL/L (ref 7–16)
APPEARANCE UR: ABNORMAL
ARTERIAL PATENCY WRIST A: YES
AST SERPL-CCNC: 44 U/L (ref 15–37)
AST SERPL-CCNC: 48 U/L (ref 15–37)
ATRIAL RATE: 56 BPM
ATRIAL RATE: 59 BPM
ATRIAL RATE: 67 BPM
BACTERIA SPEC CULT: ABNORMAL
BACTERIA SPEC CULT: NORMAL
BACTERIA SPEC CULT: NORMAL
BACTERIA URNS QL MICRO: ABNORMAL /HPF
BASE DEFICIT BLD-SCNC: 1 MMOL/L
BASE DEFICIT BLD-SCNC: 3 MMOL/L
BASE DEFICIT BLD-SCNC: 5 MMOL/L
BASE DEFICIT BLD-SCNC: 5 MMOL/L
BASE DEFICIT BLD-SCNC: 7 MMOL/L
BASE DEFICIT BLD-SCNC: 7 MMOL/L
BASOPHILS # BLD: 0 K/UL (ref 0–0.2)
BASOPHILS NFR BLD: 0 % (ref 0–2)
BDY SITE: ABNORMAL
BDY SITE: NORMAL
BILIRUB SERPL-MCNC: 0.2 MG/DL (ref 0.2–1.1)
BILIRUB SERPL-MCNC: 0.4 MG/DL (ref 0.2–1.1)
BILIRUB UR QL: NEGATIVE
BLD PROD TYP BPU: NORMAL
BLOOD GROUP ANTIBODIES SERPL: NORMAL
BNP SERPL-MCNC: 1536 PG/ML
BNP SERPL-MCNC: 1867 PG/ML
BPU ID: NORMAL
BUN SERPL-MCNC: 57 MG/DL (ref 8–23)
BUN SERPL-MCNC: 60 MG/DL (ref 8–23)
BUN SERPL-MCNC: 75 MG/DL (ref 8–23)
BUN SERPL-MCNC: 78 MG/DL (ref 8–23)
BUN SERPL-MCNC: 78 MG/DL (ref 8–23)
BUN SERPL-MCNC: 79 MG/DL (ref 8–23)
BUN SERPL-MCNC: 80 MG/DL (ref 8–23)
BUN SERPL-MCNC: 82 MG/DL (ref 8–23)
BUN SERPL-MCNC: 85 MG/DL (ref 8–23)
CALCIUM SERPL-MCNC: 8 MG/DL (ref 8.3–10.4)
CALCIUM SERPL-MCNC: 8.2 MG/DL (ref 8.3–10.4)
CALCIUM SERPL-MCNC: 8.3 MG/DL (ref 8.3–10.4)
CALCIUM SERPL-MCNC: 8.8 MG/DL (ref 8.3–10.4)
CALCULATED P AXIS, ECG09: 10 DEGREES
CALCULATED P AXIS, ECG09: 20 DEGREES
CALCULATED P AXIS, ECG09: 66 DEGREES
CALCULATED R AXIS, ECG10: 48 DEGREES
CALCULATED R AXIS, ECG10: 51 DEGREES
CALCULATED R AXIS, ECG10: 57 DEGREES
CALCULATED T AXIS, ECG11: 124 DEGREES
CALCULATED T AXIS, ECG11: 89 DEGREES
CALCULATED T AXIS, ECG11: 98 DEGREES
CASTS URNS QL MICRO: ABNORMAL /LPF
CHLORIDE SERPL-SCNC: 101 MMOL/L (ref 98–107)
CHLORIDE SERPL-SCNC: 107 MMOL/L (ref 98–107)
CHLORIDE SERPL-SCNC: 108 MMOL/L (ref 98–107)
CHLORIDE SERPL-SCNC: 109 MMOL/L (ref 98–107)
CHLORIDE SERPL-SCNC: 109 MMOL/L (ref 98–107)
CHLORIDE SERPL-SCNC: 111 MMOL/L (ref 98–107)
CHLORIDE SERPL-SCNC: 112 MMOL/L (ref 98–107)
CHLORIDE SERPL-SCNC: 112 MMOL/L (ref 98–107)
CHLORIDE SERPL-SCNC: 114 MMOL/L (ref 98–107)
CO2 BLD-SCNC: 21 MMOL/L
CO2 BLD-SCNC: 21 MMOL/L
CO2 BLD-SCNC: 22 MMOL/L
CO2 BLD-SCNC: 23 MMOL/L
CO2 BLD-SCNC: 25 MMOL/L
CO2 BLD-SCNC: 28 MMOL/L
CO2 SERPL-SCNC: 20 MMOL/L (ref 21–32)
CO2 SERPL-SCNC: 20 MMOL/L (ref 21–32)
CO2 SERPL-SCNC: 22 MMOL/L (ref 21–32)
CO2 SERPL-SCNC: 23 MMOL/L (ref 21–32)
CO2 SERPL-SCNC: 23 MMOL/L (ref 21–32)
CO2 SERPL-SCNC: 24 MMOL/L (ref 21–32)
COLLECT TIME,HTIME: 1059
COLLECT TIME,HTIME: 1331
COLLECT TIME,HTIME: 2000
COLLECT TIME,HTIME: 210
COLLECT TIME,HTIME: 215
COLLECT TIME,HTIME: 2150
COLLECT TIME,HTIME: 400
COLLECT TIME,HTIME: 505
COLOR UR: YELLOW
COVID-19 RAPID TEST, COVR: DETECTED
CREAT SERPL-MCNC: 2.85 MG/DL (ref 0.6–1)
CREAT SERPL-MCNC: 2.87 MG/DL (ref 0.6–1)
CREAT SERPL-MCNC: 3.12 MG/DL (ref 0.6–1)
CREAT SERPL-MCNC: 3.18 MG/DL (ref 0.6–1)
CREAT SERPL-MCNC: 3.22 MG/DL (ref 0.6–1)
CREAT SERPL-MCNC: 3.36 MG/DL (ref 0.6–1)
CREAT SERPL-MCNC: 3.55 MG/DL (ref 0.6–1)
CREAT SERPL-MCNC: 3.56 MG/DL (ref 0.6–1)
CREAT SERPL-MCNC: 3.78 MG/DL (ref 0.6–1)
CREAT UR-MCNC: 118 MG/DL
CRP SERPL-MCNC: 2.8 MG/DL (ref 0–0.9)
DIAGNOSIS, 93000: NORMAL
DIFFERENTIAL METHOD BLD: ABNORMAL
EOSINOPHIL # BLD: 0 K/UL (ref 0–0.8)
EOSINOPHIL NFR BLD: 0 % (ref 0.5–7.8)
EPI CELLS #/AREA URNS HPF: ABNORMAL /HPF
ERYTHROCYTE [DISTWIDTH] IN BLOOD BY AUTOMATED COUNT: 13.5 % (ref 11.9–14.6)
ERYTHROCYTE [DISTWIDTH] IN BLOOD BY AUTOMATED COUNT: 13.5 % (ref 11.9–14.6)
ERYTHROCYTE [DISTWIDTH] IN BLOOD BY AUTOMATED COUNT: 13.9 % (ref 11.9–14.6)
ERYTHROCYTE [DISTWIDTH] IN BLOOD BY AUTOMATED COUNT: 13.9 % (ref 11.9–14.6)
ERYTHROCYTE [DISTWIDTH] IN BLOOD BY AUTOMATED COUNT: 14.1 % (ref 11.9–14.6)
ERYTHROCYTE [DISTWIDTH] IN BLOOD BY AUTOMATED COUNT: 14.1 % (ref 11.9–14.6)
ERYTHROCYTE [DISTWIDTH] IN BLOOD BY AUTOMATED COUNT: 14.4 % (ref 11.9–14.6)
ERYTHROCYTE [DISTWIDTH] IN BLOOD BY AUTOMATED COUNT: 14.7 % (ref 11.9–14.6)
EST. AVERAGE GLUCOSE BLD GHB EST-MCNC: 194 MG/DL
EXHALED MINUTE VOLUME, VE: 11.3 L/MIN
EXHALED MINUTE VOLUME, VE: 11.4 L/MIN
EXHALED MINUTE VOLUME, VE: 15.7 L/MIN
EXHALED MINUTE VOLUME, VE: 16 L/MIN
EXHALED MINUTE VOLUME, VE: 16.5 L/MIN
EXHALED MINUTE VOLUME, VE: 42.3 L/MIN
EXHALED MINUTE VOLUME, VE: 8.2 L/MIN
EXHALED MINUTE VOLUME, VE: 9.8 L/MIN
GAS FLOW.O2 O2 DELIVERY SYS: ABNORMAL L/MIN
GAS FLOW.O2 O2 DELIVERY SYS: NORMAL L/MIN
GAS FLOW.O2 SETTING OXYMISER: 16 BPM
GAS FLOW.O2 SETTING OXYMISER: 20 BPM
GAS FLOW.O2 SETTING OXYMISER: 24 BPM
GAS FLOW.O2 SETTING OXYMISER: 28 BPM
GAS FLOW.O2 SETTING OXYMISER: 28 BPM
GLOBULIN SER CALC-MCNC: 4.1 G/DL (ref 2.3–3.5)
GLOBULIN SER CALC-MCNC: 4.5 G/DL (ref 2.3–3.5)
GLUCOSE BLD STRIP.AUTO-MCNC: 103 MG/DL (ref 65–100)
GLUCOSE BLD STRIP.AUTO-MCNC: 130 MG/DL (ref 65–100)
GLUCOSE BLD STRIP.AUTO-MCNC: 142 MG/DL (ref 65–100)
GLUCOSE BLD STRIP.AUTO-MCNC: 159 MG/DL (ref 65–100)
GLUCOSE BLD STRIP.AUTO-MCNC: 166 MG/DL (ref 65–100)
GLUCOSE BLD STRIP.AUTO-MCNC: 176 MG/DL (ref 65–100)
GLUCOSE BLD STRIP.AUTO-MCNC: 222 MG/DL (ref 65–100)
GLUCOSE BLD STRIP.AUTO-MCNC: 227 MG/DL (ref 65–100)
GLUCOSE BLD STRIP.AUTO-MCNC: 245 MG/DL (ref 65–100)
GLUCOSE BLD STRIP.AUTO-MCNC: 248 MG/DL (ref 65–100)
GLUCOSE BLD STRIP.AUTO-MCNC: 262 MG/DL (ref 65–100)
GLUCOSE BLD STRIP.AUTO-MCNC: 285 MG/DL (ref 65–100)
GLUCOSE BLD STRIP.AUTO-MCNC: 310 MG/DL (ref 65–100)
GLUCOSE BLD STRIP.AUTO-MCNC: 330 MG/DL (ref 65–100)
GLUCOSE BLD STRIP.AUTO-MCNC: 356 MG/DL (ref 65–100)
GLUCOSE BLD STRIP.AUTO-MCNC: 367 MG/DL (ref 65–100)
GLUCOSE BLD STRIP.AUTO-MCNC: 379 MG/DL (ref 65–100)
GLUCOSE BLD STRIP.AUTO-MCNC: 436 MG/DL (ref 65–100)
GLUCOSE BLD STRIP.AUTO-MCNC: 45 MG/DL (ref 65–100)
GLUCOSE BLD STRIP.AUTO-MCNC: 467 MG/DL (ref 65–100)
GLUCOSE BLD STRIP.AUTO-MCNC: 58 MG/DL (ref 65–100)
GLUCOSE BLD STRIP.AUTO-MCNC: 63 MG/DL (ref 65–100)
GLUCOSE BLD STRIP.AUTO-MCNC: 78 MG/DL (ref 65–100)
GLUCOSE BLD STRIP.AUTO-MCNC: 83 MG/DL (ref 65–100)
GLUCOSE BLD STRIP.AUTO-MCNC: 84 MG/DL (ref 65–100)
GLUCOSE BLD STRIP.AUTO-MCNC: 93 MG/DL (ref 65–100)
GLUCOSE SERPL-MCNC: 135 MG/DL (ref 65–100)
GLUCOSE SERPL-MCNC: 145 MG/DL (ref 65–100)
GLUCOSE SERPL-MCNC: 165 MG/DL (ref 65–100)
GLUCOSE SERPL-MCNC: 197 MG/DL (ref 65–100)
GLUCOSE SERPL-MCNC: 295 MG/DL (ref 65–100)
GLUCOSE SERPL-MCNC: 324 MG/DL (ref 65–100)
GLUCOSE SERPL-MCNC: 53 MG/DL (ref 65–100)
GLUCOSE SERPL-MCNC: 557 MG/DL (ref 65–100)
GLUCOSE SERPL-MCNC: 61 MG/DL (ref 65–100)
GLUCOSE UR STRIP.AUTO-MCNC: NEGATIVE MG/DL
HBA1C MFR BLD: 8.4 % (ref 4.8–6)
HCO3 BLD-SCNC: 19.3 MMOL/L (ref 22–26)
HCO3 BLD-SCNC: 19.8 MMOL/L (ref 22–26)
HCO3 BLD-SCNC: 21 MMOL/L (ref 22–26)
HCO3 BLD-SCNC: 21.1 MMOL/L (ref 22–26)
HCO3 BLD-SCNC: 21.1 MMOL/L (ref 22–26)
HCO3 BLD-SCNC: 22.1 MMOL/L (ref 22–26)
HCO3 BLD-SCNC: 23.4 MMOL/L (ref 22–26)
HCO3 BLD-SCNC: 26.3 MMOL/L (ref 22–26)
HCT VFR BLD AUTO: 30.9 % (ref 35.8–46.3)
HCT VFR BLD AUTO: 31.2 % (ref 35.8–46.3)
HCT VFR BLD AUTO: 31.4 % (ref 35.8–46.3)
HCT VFR BLD AUTO: 32.3 % (ref 35.8–46.3)
HCT VFR BLD AUTO: 32.5 % (ref 35.8–46.3)
HCT VFR BLD AUTO: 32.8 % (ref 35.8–46.3)
HCT VFR BLD AUTO: 35.1 % (ref 35.8–46.3)
HCT VFR BLD AUTO: 35.8 % (ref 35.8–46.3)
HGB BLD-MCNC: 10 G/DL (ref 11.7–15.4)
HGB BLD-MCNC: 10.1 G/DL (ref 11.7–15.4)
HGB BLD-MCNC: 10.5 G/DL (ref 11.7–15.4)
HGB BLD-MCNC: 10.6 G/DL (ref 11.7–15.4)
HGB BLD-MCNC: 10.9 G/DL (ref 11.7–15.4)
HGB BLD-MCNC: 11.3 G/DL (ref 11.7–15.4)
HGB BLD-MCNC: 11.9 G/DL (ref 11.7–15.4)
HGB BLD-MCNC: 9.9 G/DL (ref 11.7–15.4)
HGB UR QL STRIP: ABNORMAL
IMM GRANULOCYTES # BLD AUTO: 0 K/UL (ref 0–0.5)
IMM GRANULOCYTES NFR BLD AUTO: 1 % (ref 0–5)
INSPIRATION.DURATION SETTING TIME VENT: 0.9 SEC
INSPIRATION.DURATION SETTING TIME VENT: 1 SEC
KETONES UR QL STRIP.AUTO: NEGATIVE MG/DL
LACTATE SERPL-SCNC: 1.4 MMOL/L (ref 0.4–2)
LACTATE SERPL-SCNC: 1.5 MMOL/L (ref 0.4–2)
LEUKOCYTE ESTERASE UR QL STRIP.AUTO: ABNORMAL
LYMPHOCYTES # BLD: 0.6 K/UL (ref 0.5–4.6)
LYMPHOCYTES NFR BLD: 9 % (ref 13–44)
MAGNESIUM SERPL-MCNC: 2.6 MG/DL (ref 1.8–2.4)
MAGNESIUM SERPL-MCNC: 2.6 MG/DL (ref 1.8–2.4)
MAGNESIUM SERPL-MCNC: 2.8 MG/DL (ref 1.8–2.4)
MAGNESIUM SERPL-MCNC: 2.8 MG/DL (ref 1.8–2.4)
MAGNESIUM SERPL-MCNC: 2.9 MG/DL (ref 1.8–2.4)
MCH RBC QN AUTO: 29.7 PG (ref 26.1–32.9)
MCH RBC QN AUTO: 29.7 PG (ref 26.1–32.9)
MCH RBC QN AUTO: 29.8 PG (ref 26.1–32.9)
MCH RBC QN AUTO: 30 PG (ref 26.1–32.9)
MCH RBC QN AUTO: 30 PG (ref 26.1–32.9)
MCH RBC QN AUTO: 30.2 PG (ref 26.1–32.9)
MCHC RBC AUTO-ENTMCNC: 31.7 G/DL (ref 31.4–35)
MCHC RBC AUTO-ENTMCNC: 32 G/DL (ref 31.4–35)
MCHC RBC AUTO-ENTMCNC: 32.2 G/DL (ref 31.4–35)
MCHC RBC AUTO-ENTMCNC: 32.2 G/DL (ref 31.4–35)
MCHC RBC AUTO-ENTMCNC: 32.4 G/DL (ref 31.4–35)
MCHC RBC AUTO-ENTMCNC: 32.8 G/DL (ref 31.4–35)
MCHC RBC AUTO-ENTMCNC: 33.2 G/DL (ref 31.4–35)
MCHC RBC AUTO-ENTMCNC: 33.5 G/DL (ref 31.4–35)
MCV RBC AUTO: 90 FL (ref 79.6–97.8)
MCV RBC AUTO: 90.2 FL (ref 79.6–97.8)
MCV RBC AUTO: 90.7 FL (ref 79.6–97.8)
MCV RBC AUTO: 91.7 FL (ref 79.6–97.8)
MCV RBC AUTO: 92.6 FL (ref 79.6–97.8)
MCV RBC AUTO: 92.6 FL (ref 79.6–97.8)
MCV RBC AUTO: 92.9 FL (ref 79.6–97.8)
MCV RBC AUTO: 94.5 FL (ref 79.6–97.8)
MM INDURATION POC: 0 MM (ref 0–5)
MM INDURATION POC: 0 MM (ref 0–5)
MONOCYTES # BLD: 0.5 K/UL (ref 0.1–1.3)
MONOCYTES NFR BLD: 8 % (ref 4–12)
NEUTS SEG # BLD: 5.7 K/UL (ref 1.7–8.2)
NEUTS SEG NFR BLD: 83 % (ref 43–78)
NITRITE UR QL STRIP.AUTO: NEGATIVE
NRBC # BLD: 0 K/UL (ref 0–0.2)
NRBC # BLD: 0.02 K/UL (ref 0–0.2)
NRBC # BLD: 0.02 K/UL (ref 0–0.2)
NRBC # BLD: 0.03 K/UL (ref 0–0.2)
NRBC # BLD: 0.03 K/UL (ref 0–0.2)
NRBC # BLD: 0.04 K/UL (ref 0–0.2)
O2/TOTAL GAS SETTING VFR VENT: 100 %
O2/TOTAL GAS SETTING VFR VENT: 100 %
O2/TOTAL GAS SETTING VFR VENT: 45 %
O2/TOTAL GAS SETTING VFR VENT: 45 %
O2/TOTAL GAS SETTING VFR VENT: 60 %
O2/TOTAL GAS SETTING VFR VENT: 80 %
O2/TOTAL GAS SETTING VFR VENT: 80 %
O2/TOTAL GAS SETTING VFR VENT: 85 %
OTHER OBSERVATIONS,UCOM: ABNORMAL
P-R INTERVAL, ECG05: 152 MS
P-R INTERVAL, ECG05: 182 MS
P-R INTERVAL, ECG05: 232 MS
PCO2 BLD: 33 MMHG (ref 35–45)
PCO2 BLD: 39.6 MMHG (ref 35–45)
PCO2 BLD: 40.6 MMHG (ref 35–45)
PCO2 BLD: 41.8 MMHG (ref 35–45)
PCO2 BLD: 42.5 MMHG (ref 35–45)
PCO2 BLD: 43.1 MMHG (ref 35–45)
PCO2 BLD: 47.6 MMHG (ref 35–45)
PCO2 BLD: 54 MMHG (ref 35–45)
PEEP RESPIRATORY: 10 CMH2O
PEEP RESPIRATORY: 10 CMH2O
PEEP RESPIRATORY: 12 CMH2O
PEEP RESPIRATORY: 8 CMH2O
PEEP RESPIRATORY: 8 CMH2O
PH BLD: 7.27 [PH] (ref 7.35–7.45)
PH BLD: 7.29 [PH] (ref 7.35–7.45)
PH BLD: 7.3 [PH] (ref 7.35–7.45)
PH BLD: 7.31 [PH] (ref 7.35–7.45)
PH BLD: 7.35 [PH] (ref 7.35–7.45)
PH BLD: 7.41 [PH] (ref 7.35–7.45)
PH UR STRIP: 5 [PH] (ref 5–9)
PHOSPHATE SERPL-MCNC: 4 MG/DL (ref 2.3–3.7)
PHOSPHATE SERPL-MCNC: 4.3 MG/DL (ref 2.3–3.7)
PHOSPHATE SERPL-MCNC: 4.6 MG/DL (ref 2.3–3.7)
PHOSPHATE SERPL-MCNC: 4.7 MG/DL (ref 2.3–3.7)
PHOSPHATE SERPL-MCNC: 5.2 MG/DL (ref 2.3–3.7)
PIP ISTAT,IPIP: 16
PIP ISTAT,IPIP: 17
PIP ISTAT,IPIP: 20
PLATELET # BLD AUTO: 177 K/UL (ref 150–450)
PLATELET # BLD AUTO: 193 K/UL (ref 150–450)
PLATELET # BLD AUTO: 212 K/UL (ref 150–450)
PLATELET # BLD AUTO: 221 K/UL (ref 150–450)
PLATELET # BLD AUTO: 223 K/UL (ref 150–450)
PLATELET # BLD AUTO: 238 K/UL (ref 150–450)
PLATELET # BLD AUTO: 238 K/UL (ref 150–450)
PLATELET # BLD AUTO: 260 K/UL (ref 150–450)
PMV BLD AUTO: 10.4 FL (ref 9.4–12.3)
PMV BLD AUTO: 10.4 FL (ref 9.4–12.3)
PMV BLD AUTO: 10.6 FL (ref 9.4–12.3)
PMV BLD AUTO: 10.6 FL (ref 9.4–12.3)
PMV BLD AUTO: 10.8 FL (ref 9.4–12.3)
PMV BLD AUTO: 11.2 FL (ref 9.4–12.3)
PMV BLD AUTO: 11.2 FL (ref 9.4–12.3)
PMV BLD AUTO: 11.5 FL (ref 9.4–12.3)
PO2 BLD: 128 MMHG (ref 75–100)
PO2 BLD: 365 MMHG (ref 75–100)
PO2 BLD: 65 MMHG (ref 75–100)
PO2 BLD: 66 MMHG (ref 75–100)
PO2 BLD: 70 MMHG (ref 75–100)
PO2 BLD: 77 MMHG (ref 75–100)
PO2 BLD: 81 MMHG (ref 75–100)
PO2 BLD: 85 MMHG (ref 75–100)
POTASSIUM SERPL-SCNC: 4.5 MMOL/L (ref 3.5–5.1)
POTASSIUM SERPL-SCNC: 4.5 MMOL/L (ref 3.5–5.1)
POTASSIUM SERPL-SCNC: 4.8 MMOL/L (ref 3.5–5.1)
POTASSIUM SERPL-SCNC: 4.8 MMOL/L (ref 3.5–5.1)
POTASSIUM SERPL-SCNC: 5 MMOL/L (ref 3.5–5.1)
POTASSIUM SERPL-SCNC: 5.1 MMOL/L (ref 3.5–5.1)
POTASSIUM SERPL-SCNC: 5.8 MMOL/L (ref 3.5–5.1)
POTASSIUM SERPL-SCNC: 6 MMOL/L (ref 3.5–5.1)
PPD POC: NEGATIVE NEGATIVE
PPD POC: NEGATIVE NEGATIVE
PROCALCITONIN SERPL-MCNC: 0.62 NG/ML
PROT SERPL-MCNC: 6.4 G/DL (ref 6.3–8.2)
PROT SERPL-MCNC: 7.3 G/DL (ref 6.3–8.2)
PROT UR STRIP-MCNC: 300 MG/DL
Q-T INTERVAL, ECG07: 424 MS
Q-T INTERVAL, ECG07: 498 MS
Q-T INTERVAL, ECG07: 522 MS
QRS DURATION, ECG06: 150 MS
QRS DURATION, ECG06: 84 MS
QRS DURATION, ECG06: 90 MS
QTC CALCULATION (BEZET), ECG08: 448 MS
QTC CALCULATION (BEZET), ECG08: 480 MS
QTC CALCULATION (BEZET), ECG08: 516 MS
RBC # BLD AUTO: 3.3 M/UL (ref 4.05–5.2)
RBC # BLD AUTO: 3.37 M/UL (ref 4.05–5.2)
RBC # BLD AUTO: 3.39 M/UL (ref 4.05–5.2)
RBC # BLD AUTO: 3.53 M/UL (ref 4.05–5.2)
RBC # BLD AUTO: 3.56 M/UL (ref 4.05–5.2)
RBC # BLD AUTO: 3.61 M/UL (ref 4.05–5.2)
RBC # BLD AUTO: 3.79 M/UL (ref 4.05–5.2)
RBC # BLD AUTO: 3.97 M/UL (ref 4.05–5.2)
RBC #/AREA URNS HPF: ABNORMAL /HPF
SAO2 % BLD: 100 % (ref 95–98)
SAO2 % BLD: 90 % (ref 95–98)
SAO2 % BLD: 90 % (ref 95–98)
SAO2 % BLD: 92 % (ref 95–98)
SAO2 % BLD: 94 % (ref 95–98)
SAO2 % BLD: 96 % (ref 95–98)
SAO2 % BLD: 96 % (ref 95–98)
SAO2 % BLD: 98 % (ref 95–98)
SERVICE CMNT-IMP: ABNORMAL
SERVICE CMNT-IMP: NORMAL
SODIUM SERPL-SCNC: 132 MMOL/L (ref 136–145)
SODIUM SERPL-SCNC: 134 MMOL/L (ref 136–145)
SODIUM SERPL-SCNC: 136 MMOL/L (ref 136–145)
SODIUM SERPL-SCNC: 138 MMOL/L (ref 136–145)
SODIUM SERPL-SCNC: 139 MMOL/L (ref 136–145)
SODIUM SERPL-SCNC: 140 MMOL/L (ref 136–145)
SODIUM SERPL-SCNC: 143 MMOL/L (ref 136–145)
SODIUM UR-SCNC: 22 MMOL/L
SOURCE, COVRS: ABNORMAL
SP GR UR REFRACTOMETRY: 1.01 (ref 1–1.02)
SPECIMEN EXP DATE BLD: NORMAL
SPECIMEN TYPE: ABNORMAL
SPECIMEN TYPE: NORMAL
STATUS OF UNIT,%ST: NORMAL
TROPONIN-HIGH SENSITIVITY: 22.3 PG/ML (ref 0–14)
UNIT DIVISION, %UDIV: NORMAL
UROBILINOGEN UR QL STRIP.AUTO: 0.2 EU/DL (ref 0.2–1)
VENTILATION MODE VENT: ABNORMAL
VENTILATION MODE VENT: NORMAL
VENTRICULAR RATE, ECG03: 56 BPM
VENTRICULAR RATE, ECG03: 59 BPM
VENTRICULAR RATE, ECG03: 67 BPM
VT SETTING VENT: 400 ML
VT SETTING VENT: 483 ML
VT SETTING VENT: 664 ML
VT SETTING VENT: 885 ML
WBC # BLD AUTO: 11.7 K/UL (ref 4.3–11.1)
WBC # BLD AUTO: 12.4 K/UL (ref 4.3–11.1)
WBC # BLD AUTO: 13 K/UL (ref 4.3–11.1)
WBC # BLD AUTO: 14.8 K/UL (ref 4.3–11.1)
WBC # BLD AUTO: 15.4 K/UL (ref 4.3–11.1)
WBC # BLD AUTO: 6.3 K/UL (ref 4.3–11.1)
WBC # BLD AUTO: 6.9 K/UL (ref 4.3–11.1)
WBC # BLD AUTO: 9.9 K/UL (ref 4.3–11.1)
WBC URNS QL MICRO: ABNORMAL /HPF
YEAST URNS QL MICRO: ABNORMAL

## 2020-01-01 PROCEDURE — 74011250636 HC RX REV CODE- 250/636: Performed by: INTERNAL MEDICINE

## 2020-01-01 PROCEDURE — C9113 INJ PANTOPRAZOLE SODIUM, VIA: HCPCS | Performed by: INTERNAL MEDICINE

## 2020-01-01 PROCEDURE — 86850 RBC ANTIBODY SCREEN: CPT

## 2020-01-01 PROCEDURE — XW13325 TRANSFUSION OF CONVALESCENT PLASMA (NONAUTOLOGOUS) INTO PERIPHERAL VEIN, PERCUTANEOUS APPROACH, NEW TECHNOLOGY GROUP 5: ICD-10-PCS | Performed by: INTERNAL MEDICINE

## 2020-01-01 PROCEDURE — 99233 SBSQ HOSP IP/OBS HIGH 50: CPT | Performed by: INTERNAL MEDICINE

## 2020-01-01 PROCEDURE — 94640 AIRWAY INHALATION TREATMENT: CPT

## 2020-01-01 PROCEDURE — 74011000250 HC RX REV CODE- 250: Performed by: EMERGENCY MEDICINE

## 2020-01-01 PROCEDURE — 93005 ELECTROCARDIOGRAM TRACING: CPT | Performed by: INTERNAL MEDICINE

## 2020-01-01 PROCEDURE — 84100 ASSAY OF PHOSPHORUS: CPT

## 2020-01-01 PROCEDURE — 85025 COMPLETE CBC W/AUTO DIFF WBC: CPT

## 2020-01-01 PROCEDURE — 94660 CPAP INITIATION&MGMT: CPT

## 2020-01-01 PROCEDURE — 74011636637 HC RX REV CODE- 636/637: Performed by: INTERNAL MEDICINE

## 2020-01-01 PROCEDURE — 80053 COMPREHEN METABOLIC PANEL: CPT

## 2020-01-01 PROCEDURE — 81001 URINALYSIS AUTO W/SCOPE: CPT

## 2020-01-01 PROCEDURE — 82962 GLUCOSE BLOOD TEST: CPT

## 2020-01-01 PROCEDURE — 71045 X-RAY EXAM CHEST 1 VIEW: CPT

## 2020-01-01 PROCEDURE — 74011250636 HC RX REV CODE- 250/636

## 2020-01-01 PROCEDURE — 74011250636 HC RX REV CODE- 250/636: Performed by: EMERGENCY MEDICINE

## 2020-01-01 PROCEDURE — 84300 ASSAY OF URINE SODIUM: CPT

## 2020-01-01 PROCEDURE — 74011000250 HC RX REV CODE- 250: Performed by: INTERNAL MEDICINE

## 2020-01-01 PROCEDURE — 65610000006 HC RM INTENSIVE CARE

## 2020-01-01 PROCEDURE — 96365 THER/PROPH/DIAG IV INF INIT: CPT

## 2020-01-01 PROCEDURE — 82570 ASSAY OF URINE CREATININE: CPT

## 2020-01-01 PROCEDURE — 87106 FUNGI IDENTIFICATION YEAST: CPT

## 2020-01-01 PROCEDURE — 80048 BASIC METABOLIC PNL TOTAL CA: CPT

## 2020-01-01 PROCEDURE — 77010033711 HC HIGH FLOW OXYGEN

## 2020-01-01 PROCEDURE — 94003 VENT MGMT INPAT SUBQ DAY: CPT

## 2020-01-01 PROCEDURE — 84132 ASSAY OF SERUM POTASSIUM: CPT

## 2020-01-01 PROCEDURE — 74011250637 HC RX REV CODE- 250/637: Performed by: INTERNAL MEDICINE

## 2020-01-01 PROCEDURE — 0BH17EZ INSERTION OF ENDOTRACHEAL AIRWAY INTO TRACHEA, VIA NATURAL OR ARTIFICIAL OPENING: ICD-10-PCS | Performed by: INTERNAL MEDICINE

## 2020-01-01 PROCEDURE — 93005 ELECTROCARDIOGRAM TRACING: CPT | Performed by: EMERGENCY MEDICINE

## 2020-01-01 PROCEDURE — 83735 ASSAY OF MAGNESIUM: CPT

## 2020-01-01 PROCEDURE — 85027 COMPLETE CBC AUTOMATED: CPT

## 2020-01-01 PROCEDURE — 87088 URINE BACTERIA CULTURE: CPT

## 2020-01-01 PROCEDURE — 74018 RADEX ABDOMEN 1 VIEW: CPT

## 2020-01-01 PROCEDURE — 02H633Z INSERTION OF INFUSION DEVICE INTO RIGHT ATRIUM, PERCUTANEOUS APPROACH: ICD-10-PCS | Performed by: ANESTHESIOLOGY

## 2020-01-01 PROCEDURE — 36600 WITHDRAWAL OF ARTERIAL BLOOD: CPT

## 2020-01-01 PROCEDURE — 74011000258 HC RX REV CODE- 258: Performed by: INTERNAL MEDICINE

## 2020-01-01 PROCEDURE — 36430 TRANSFUSION BLD/BLD COMPNT: CPT

## 2020-01-01 PROCEDURE — 87040 BLOOD CULTURE FOR BACTERIA: CPT

## 2020-01-01 PROCEDURE — 5A1955Z RESPIRATORY VENTILATION, GREATER THAN 96 CONSECUTIVE HOURS: ICD-10-PCS | Performed by: INTERNAL MEDICINE

## 2020-01-01 PROCEDURE — 94002 VENT MGMT INPAT INIT DAY: CPT

## 2020-01-01 PROCEDURE — 99291 CRITICAL CARE FIRST HOUR: CPT | Performed by: INTERNAL MEDICINE

## 2020-01-01 PROCEDURE — 74011250637 HC RX REV CODE- 250/637: Performed by: NURSE PRACTITIONER

## 2020-01-01 PROCEDURE — 74011636637 HC RX REV CODE- 636/637: Performed by: FAMILY MEDICINE

## 2020-01-01 PROCEDURE — 96375 TX/PRO/DX INJ NEW DRUG ADDON: CPT

## 2020-01-01 PROCEDURE — 82803 BLOOD GASES ANY COMBINATION: CPT

## 2020-01-01 PROCEDURE — 83880 ASSAY OF NATRIURETIC PEPTIDE: CPT

## 2020-01-01 PROCEDURE — 87086 URINE CULTURE/COLONY COUNT: CPT

## 2020-01-01 PROCEDURE — 86580 TB INTRADERMAL TEST: CPT | Performed by: INTERNAL MEDICINE

## 2020-01-01 PROCEDURE — 36558 INSERT TUNNELED CV CATH: CPT

## 2020-01-01 PROCEDURE — 5A09357 ASSISTANCE WITH RESPIRATORY VENTILATION, LESS THAN 24 CONSECUTIVE HOURS, CONTINUOUS POSITIVE AIRWAY PRESSURE: ICD-10-PCS | Performed by: INTERNAL MEDICINE

## 2020-01-01 PROCEDURE — 83605 ASSAY OF LACTIC ACID: CPT

## 2020-01-01 PROCEDURE — 3E0G76Z INTRODUCTION OF NUTRITIONAL SUBSTANCE INTO UPPER GI, VIA NATURAL OR ARTIFICIAL OPENING: ICD-10-PCS | Performed by: INTERNAL MEDICINE

## 2020-01-01 PROCEDURE — 99285 EMERGENCY DEPT VISIT HI MDM: CPT

## 2020-01-01 PROCEDURE — 74011000258 HC RX REV CODE- 258: Performed by: EMERGENCY MEDICINE

## 2020-01-01 PROCEDURE — 87186 SC STD MICRODIL/AGAR DIL: CPT

## 2020-01-01 PROCEDURE — 31500 INSERT EMERGENCY AIRWAY: CPT | Performed by: INTERNAL MEDICINE

## 2020-01-01 PROCEDURE — 2709999900 HC NON-CHARGEABLE SUPPLY

## 2020-01-01 PROCEDURE — 84145 PROCALCITONIN (PCT): CPT

## 2020-01-01 PROCEDURE — 74011000250 HC RX REV CODE- 250

## 2020-01-01 PROCEDURE — 99223 1ST HOSP IP/OBS HIGH 75: CPT | Performed by: INTERNAL MEDICINE

## 2020-01-01 PROCEDURE — 86140 C-REACTIVE PROTEIN: CPT

## 2020-01-01 PROCEDURE — 74011000302 HC RX REV CODE- 302: Performed by: INTERNAL MEDICINE

## 2020-01-01 PROCEDURE — 74011250636 HC RX REV CODE- 250/636: Performed by: NURSE PRACTITIONER

## 2020-01-01 PROCEDURE — 84484 ASSAY OF TROPONIN QUANT: CPT

## 2020-01-01 PROCEDURE — 77010033678 HC OXYGEN DAILY

## 2020-01-01 PROCEDURE — 36592 COLLECT BLOOD FROM PICC: CPT

## 2020-01-01 PROCEDURE — 94761 N-INVAS EAR/PLS OXIMETRY MLT: CPT

## 2020-01-01 PROCEDURE — 93306 TTE W/DOPPLER COMPLETE: CPT

## 2020-01-01 PROCEDURE — 87635 SARS-COV-2 COVID-19 AMP PRB: CPT

## 2020-01-01 PROCEDURE — 3E0333Z INTRODUCTION OF ANTI-INFLAMMATORY INTO PERIPHERAL VEIN, PERCUTANEOUS APPROACH: ICD-10-PCS | Performed by: EMERGENCY MEDICINE

## 2020-01-01 PROCEDURE — 83036 HEMOGLOBIN GLYCOSYLATED A1C: CPT

## 2020-01-01 RX ORDER — AZITHROMYCIN 250 MG/1
250 TABLET, FILM COATED ORAL DAILY
Status: DISCONTINUED | OUTPATIENT
Start: 2020-01-01 | End: 2020-01-01

## 2020-01-01 RX ORDER — HYDRALAZINE HYDROCHLORIDE 10 MG/1
10 TABLET, FILM COATED ORAL 3 TIMES DAILY
Status: DISCONTINUED | OUTPATIENT
Start: 2020-01-01 | End: 2021-01-01

## 2020-01-01 RX ORDER — DEXTROSE 50 % IN WATER (D50W) INTRAVENOUS SYRINGE
25 ONCE
Status: COMPLETED | OUTPATIENT
Start: 2021-01-01 | End: 2020-01-01

## 2020-01-01 RX ORDER — ETOMIDATE 2 MG/ML
INJECTION INTRAVENOUS
Status: COMPLETED
Start: 2020-01-01 | End: 2020-01-01

## 2020-01-01 RX ORDER — ZOLPIDEM TARTRATE 5 MG/1
5 TABLET ORAL ONCE
Status: COMPLETED | OUTPATIENT
Start: 2020-01-01 | End: 2020-01-01

## 2020-01-01 RX ORDER — NOREPINEPHRINE BITARTRATE/D5W 4MG/250ML
.5-16 PLASTIC BAG, INJECTION (ML) INTRAVENOUS
Status: DISCONTINUED | OUTPATIENT
Start: 2020-01-01 | End: 2021-01-01

## 2020-01-01 RX ORDER — ZOLPIDEM TARTRATE 5 MG/1
5 TABLET ORAL ONCE
Status: DISCONTINUED | OUTPATIENT
Start: 2020-01-01 | End: 2020-01-01 | Stop reason: SDUPTHER

## 2020-01-01 RX ORDER — INSULIN LISPRO 100 [IU]/ML
0-15 INJECTION, SOLUTION INTRAVENOUS; SUBCUTANEOUS EVERY 6 HOURS
Status: DISCONTINUED | OUTPATIENT
Start: 2020-01-01 | End: 2021-01-01

## 2020-01-01 RX ORDER — SODIUM CHLORIDE 0.9 % (FLUSH) 0.9 %
5-40 SYRINGE (ML) INJECTION AS NEEDED
Status: DISCONTINUED | OUTPATIENT
Start: 2020-01-01 | End: 2021-01-01

## 2020-01-01 RX ORDER — POLYETHYLENE GLYCOL 3350 17 G/17G
17 POWDER, FOR SOLUTION ORAL DAILY
Status: DISCONTINUED | OUTPATIENT
Start: 2021-01-01 | End: 2021-01-01

## 2020-01-01 RX ORDER — ALBUTEROL SULFATE 0.83 MG/ML
2.5 SOLUTION RESPIRATORY (INHALATION)
Status: DISCONTINUED | OUTPATIENT
Start: 2020-01-01 | End: 2021-01-01

## 2020-01-01 RX ORDER — ACETAMINOPHEN 325 MG/1
650 TABLET ORAL
Status: DISCONTINUED | OUTPATIENT
Start: 2020-01-01 | End: 2021-01-01 | Stop reason: HOSPADM

## 2020-01-01 RX ORDER — AZITHROMYCIN 250 MG/1
250 TABLET, FILM COATED ORAL DAILY
Status: COMPLETED | OUTPATIENT
Start: 2020-01-01 | End: 2020-01-01

## 2020-01-01 RX ORDER — SODIUM CHLORIDE 9 MG/ML
250 INJECTION, SOLUTION INTRAVENOUS AS NEEDED
Status: DISCONTINUED | OUTPATIENT
Start: 2020-01-01 | End: 2021-01-01 | Stop reason: SDUPTHER

## 2020-01-01 RX ORDER — HALOPERIDOL 5 MG/ML
2.5 INJECTION INTRAMUSCULAR ONCE
Status: COMPLETED | OUTPATIENT
Start: 2020-01-01 | End: 2020-01-01

## 2020-01-01 RX ORDER — MIDAZOLAM IN 0.9 % SOD.CHLORID 1 MG/ML
0-10 PLASTIC BAG, INJECTION (ML) INTRAVENOUS
Status: DISCONTINUED | OUTPATIENT
Start: 2020-01-01 | End: 2021-01-01

## 2020-01-01 RX ORDER — DOPAMINE HYDROCHLORIDE 320 MG/100ML
INJECTION, SOLUTION INTRAVENOUS
Status: ACTIVE
Start: 2020-01-01 | End: 2020-01-01

## 2020-01-01 RX ORDER — INSULIN LISPRO 100 [IU]/ML
INJECTION, SOLUTION INTRAVENOUS; SUBCUTANEOUS
Status: DISCONTINUED | OUTPATIENT
Start: 2020-01-01 | End: 2020-01-01

## 2020-01-01 RX ORDER — DEXAMETHASONE SODIUM PHOSPHATE 4 MG/ML
6 INJECTION, SOLUTION INTRA-ARTICULAR; INTRALESIONAL; INTRAMUSCULAR; INTRAVENOUS; SOFT TISSUE DAILY
Status: COMPLETED | OUTPATIENT
Start: 2020-01-01 | End: 2021-01-01

## 2020-01-01 RX ORDER — CALCIUM GLUCONATE 20 MG/ML
1 INJECTION, SOLUTION INTRAVENOUS ONCE
Status: COMPLETED | OUTPATIENT
Start: 2020-01-01 | End: 2020-01-01

## 2020-01-01 RX ORDER — DEXAMETHASONE SODIUM PHOSPHATE 100 MG/10ML
10 INJECTION INTRAMUSCULAR; INTRAVENOUS ONCE
Status: COMPLETED | OUTPATIENT
Start: 2020-01-01 | End: 2020-01-01

## 2020-01-01 RX ORDER — ASCORBIC ACID 500 MG
1000 TABLET ORAL 2 TIMES DAILY
Status: DISCONTINUED | OUTPATIENT
Start: 2020-01-01 | End: 2020-01-01

## 2020-01-01 RX ORDER — DOBUTAMINE 12.5 MG/ML
2-10 INJECTION, SOLUTION INTRAVENOUS CONTINUOUS
Status: DISCONTINUED | OUTPATIENT
Start: 2020-01-01 | End: 2020-01-01

## 2020-01-01 RX ORDER — HEPARIN SODIUM 5000 [USP'U]/ML
5000 INJECTION, SOLUTION INTRAVENOUS; SUBCUTANEOUS EVERY 8 HOURS
Status: DISCONTINUED | OUTPATIENT
Start: 2020-01-01 | End: 2021-01-01 | Stop reason: SDUPTHER

## 2020-01-01 RX ORDER — ALBUTEROL SULFATE 90 UG/1
2 AEROSOL, METERED RESPIRATORY (INHALATION)
Status: DISCONTINUED | OUTPATIENT
Start: 2020-01-01 | End: 2021-01-01

## 2020-01-01 RX ORDER — DEXTROSE 40 %
15 GEL (GRAM) ORAL AS NEEDED
Status: DISCONTINUED | OUTPATIENT
Start: 2020-01-01 | End: 2021-01-01 | Stop reason: HOSPADM

## 2020-01-01 RX ORDER — HYDRALAZINE HYDROCHLORIDE 20 MG/ML
20 INJECTION INTRAMUSCULAR; INTRAVENOUS
Status: DISCONTINUED | OUTPATIENT
Start: 2020-01-01 | End: 2021-01-01

## 2020-01-01 RX ORDER — MELATONIN
5000 DAILY
Status: DISCONTINUED | OUTPATIENT
Start: 2020-01-01 | End: 2020-01-01

## 2020-01-01 RX ORDER — INSULIN LISPRO 100 [IU]/ML
0-15 INJECTION, SOLUTION INTRAVENOUS; SUBCUTANEOUS
Status: DISCONTINUED | OUTPATIENT
Start: 2020-01-01 | End: 2020-01-01

## 2020-01-01 RX ORDER — ASCORBIC ACID 500 MG
1000 TABLET ORAL 2 TIMES DAILY
Status: DISCONTINUED | OUTPATIENT
Start: 2020-01-01 | End: 2021-01-01

## 2020-01-01 RX ORDER — ETOMIDATE 2 MG/ML
0.15 INJECTION INTRAVENOUS ONCE
Status: COMPLETED | OUTPATIENT
Start: 2020-01-01 | End: 2020-01-01

## 2020-01-01 RX ORDER — FENTANYL CITRATE 50 UG/ML
100 INJECTION, SOLUTION INTRAMUSCULAR; INTRAVENOUS ONCE
Status: COMPLETED | OUTPATIENT
Start: 2020-01-01 | End: 2020-01-01

## 2020-01-01 RX ORDER — AMOXICILLIN 250 MG
2 CAPSULE ORAL DAILY
Status: DISCONTINUED | OUTPATIENT
Start: 2021-01-01 | End: 2021-01-01

## 2020-01-01 RX ORDER — INSULIN GLARGINE 100 [IU]/ML
15 INJECTION, SOLUTION SUBCUTANEOUS DAILY
Status: DISCONTINUED | OUTPATIENT
Start: 2020-01-01 | End: 2020-01-01

## 2020-01-01 RX ORDER — ERGOCALCIFEROL 1.25 MG/1
50000 CAPSULE ORAL DAILY
Status: COMPLETED | OUTPATIENT
Start: 2020-01-01 | End: 2020-01-01

## 2020-01-01 RX ORDER — INSULIN GLARGINE 100 [IU]/ML
20 INJECTION, SOLUTION SUBCUTANEOUS 2 TIMES DAILY
Status: DISCONTINUED | OUTPATIENT
Start: 2020-01-01 | End: 2021-01-01

## 2020-01-01 RX ORDER — DOPAMINE HYDROCHLORIDE 320 MG/100ML
0-50 INJECTION, SOLUTION INTRAVENOUS
Status: DISCONTINUED | OUTPATIENT
Start: 2020-01-01 | End: 2021-01-01

## 2020-01-01 RX ORDER — UREA 10 %
220 LOTION (ML) TOPICAL DAILY
Status: DISCONTINUED | OUTPATIENT
Start: 2020-01-01 | End: 2021-01-01

## 2020-01-01 RX ORDER — INSULIN GLARGINE 100 [IU]/ML
10 INJECTION, SOLUTION SUBCUTANEOUS ONCE
Status: COMPLETED | OUTPATIENT
Start: 2020-01-01 | End: 2020-01-01

## 2020-01-01 RX ORDER — MELATONIN
5000 DAILY
Status: DISCONTINUED | OUTPATIENT
Start: 2020-01-01 | End: 2021-01-01

## 2020-01-01 RX ORDER — HYDRALAZINE HYDROCHLORIDE 10 MG/1
10 TABLET, FILM COATED ORAL 3 TIMES DAILY
Status: DISCONTINUED | OUTPATIENT
Start: 2020-01-01 | End: 2020-01-01

## 2020-01-01 RX ORDER — ASCORBIC ACID 500 MG
500 TABLET ORAL 2 TIMES DAILY
Status: DISCONTINUED | OUTPATIENT
Start: 2020-01-01 | End: 2020-01-01

## 2020-01-01 RX ORDER — DEXTROSE 50 % IN WATER (D50W) INTRAVENOUS SYRINGE
25-50 AS NEEDED
Status: DISCONTINUED | OUTPATIENT
Start: 2020-01-01 | End: 2021-01-01 | Stop reason: HOSPADM

## 2020-01-01 RX ORDER — ROCURONIUM BROMIDE 10 MG/ML
0.1 INJECTION, SOLUTION INTRAVENOUS
Status: DISPENSED | OUTPATIENT
Start: 2020-01-01 | End: 2020-01-01

## 2020-01-01 RX ORDER — DOBUTAMINE HYDROCHLORIDE 200 MG/100ML
2-10 INJECTION INTRAVENOUS
Status: DISCONTINUED | OUTPATIENT
Start: 2020-01-01 | End: 2021-01-01

## 2020-01-01 RX ORDER — INSULIN GLARGINE 100 [IU]/ML
15 INJECTION, SOLUTION SUBCUTANEOUS 2 TIMES DAILY
Status: DISCONTINUED | OUTPATIENT
Start: 2020-01-01 | End: 2020-01-01

## 2020-01-01 RX ORDER — SODIUM CHLORIDE 0.9 % (FLUSH) 0.9 %
5-40 SYRINGE (ML) INJECTION EVERY 8 HOURS
Status: DISCONTINUED | OUTPATIENT
Start: 2020-01-01 | End: 2021-01-01 | Stop reason: HOSPADM

## 2020-01-01 RX ORDER — FENTANYL CITRATE-0.9 % NACL/PF 25 MCG/ML
0-200 PLASTIC BAG, INJECTION (ML) INJECTION
Status: DISCONTINUED | OUTPATIENT
Start: 2020-01-01 | End: 2021-01-01

## 2020-01-01 RX ORDER — PROPOFOL 10 MG/ML
INJECTION, EMULSION INTRAVENOUS
Status: DISCONTINUED
Start: 2020-01-01 | End: 2020-01-01

## 2020-01-01 RX ORDER — DEXTROSE MONOHYDRATE 50 MG/ML
50 INJECTION, SOLUTION INTRAVENOUS CONTINUOUS
Status: DISCONTINUED | OUTPATIENT
Start: 2020-01-01 | End: 2021-01-01

## 2020-01-01 RX ORDER — SODIUM CHLORIDE, SODIUM LACTATE, POTASSIUM CHLORIDE, CALCIUM CHLORIDE 600; 310; 30; 20 MG/100ML; MG/100ML; MG/100ML; MG/100ML
75 INJECTION, SOLUTION INTRAVENOUS CONTINUOUS
Status: DISCONTINUED | OUTPATIENT
Start: 2020-01-01 | End: 2020-01-01

## 2020-01-01 RX ORDER — FENTANYL CITRATE 50 UG/ML
INJECTION, SOLUTION INTRAMUSCULAR; INTRAVENOUS
Status: COMPLETED
Start: 2020-01-01 | End: 2020-01-01

## 2020-01-01 RX ORDER — MELATONIN
2000 2 TIMES DAILY
Status: DISCONTINUED | OUTPATIENT
Start: 2020-01-01 | End: 2020-01-01

## 2020-01-01 RX ORDER — MIDAZOLAM HYDROCHLORIDE 1 MG/ML
INJECTION, SOLUTION INTRAMUSCULAR; INTRAVENOUS
Status: DISCONTINUED
Start: 2020-01-01 | End: 2020-01-01 | Stop reason: WASHOUT

## 2020-01-01 RX ORDER — ALBUTEROL SULFATE 90 UG/1
2 AEROSOL, METERED RESPIRATORY (INHALATION)
Status: DISCONTINUED | OUTPATIENT
Start: 2020-01-01 | End: 2020-01-01 | Stop reason: CLARIF

## 2020-01-01 RX ORDER — ALBUTEROL SULFATE 0.83 MG/ML
5 SOLUTION RESPIRATORY (INHALATION)
Status: COMPLETED | OUTPATIENT
Start: 2020-01-01 | End: 2020-01-01

## 2020-01-01 RX ORDER — SODIUM CHLORIDE, SODIUM LACTATE, POTASSIUM CHLORIDE, CALCIUM CHLORIDE 600; 310; 30; 20 MG/100ML; MG/100ML; MG/100ML; MG/100ML
75 INJECTION, SOLUTION INTRAVENOUS CONTINUOUS
Status: DISPENSED | OUTPATIENT
Start: 2020-01-01 | End: 2020-01-01

## 2020-01-01 RX ORDER — UREA 10 %
220 LOTION (ML) TOPICAL DAILY
Status: DISCONTINUED | OUTPATIENT
Start: 2020-01-01 | End: 2020-01-01

## 2020-01-01 RX ORDER — SODIUM POLYSTYRENE SULFONATE 15 G/60ML
30 SUSPENSION ORAL; RECTAL
Status: COMPLETED | OUTPATIENT
Start: 2021-01-01 | End: 2020-01-01

## 2020-01-01 RX ORDER — NOREPINEPHRINE BITARTRATE/D5W 4MG/250ML
PLASTIC BAG, INJECTION (ML) INTRAVENOUS
Status: DISCONTINUED
Start: 2020-01-01 | End: 2020-01-01 | Stop reason: WASHOUT

## 2020-01-01 RX ADMIN — ALBUTEROL SULFATE 2.5 MG: 2.5 SOLUTION RESPIRATORY (INHALATION) at 20:23

## 2020-01-01 RX ADMIN — ETOMIDATE 10.8 MG: 2 INJECTION INTRAVENOUS at 08:04

## 2020-01-01 RX ADMIN — SODIUM POLYSTYRENE SULFONATE 30 G: 15 SUSPENSION ORAL; RECTAL at 23:32

## 2020-01-01 RX ADMIN — Medication 100 MCG/HR: at 09:00

## 2020-01-01 RX ADMIN — DEXAMETHASONE SODIUM PHOSPHATE 6 MG: 4 INJECTION, SOLUTION INTRAMUSCULAR; INTRAVENOUS at 09:05

## 2020-01-01 RX ADMIN — ALBUTEROL SULFATE 2.5 MG: 2.5 SOLUTION RESPIRATORY (INHALATION) at 03:44

## 2020-01-01 RX ADMIN — ALBUTEROL SULFATE 2.5 MG: 2.5 SOLUTION RESPIRATORY (INHALATION) at 08:38

## 2020-01-01 RX ADMIN — DEXAMETHASONE SODIUM PHOSPHATE 6 MG: 4 INJECTION, SOLUTION INTRAMUSCULAR; INTRAVENOUS at 09:00

## 2020-01-01 RX ADMIN — CEFTRIAXONE SODIUM 1 G: 1 INJECTION, POWDER, FOR SOLUTION INTRAMUSCULAR; INTRAVENOUS at 14:42

## 2020-01-01 RX ADMIN — ALBUTEROL SULFATE 2.5 MG: 2.5 SOLUTION RESPIRATORY (INHALATION) at 14:29

## 2020-01-01 RX ADMIN — CEFTRIAXONE SODIUM 1 G: 1 INJECTION, POWDER, FOR SOLUTION INTRAMUSCULAR; INTRAVENOUS at 14:58

## 2020-01-01 RX ADMIN — SODIUM CHLORIDE, SODIUM LACTATE, POTASSIUM CHLORIDE, AND CALCIUM CHLORIDE 75 ML/HR: 600; 310; 30; 20 INJECTION, SOLUTION INTRAVENOUS at 09:47

## 2020-01-01 RX ADMIN — SODIUM CHLORIDE 5 ML: 9 INJECTION, SOLUTION INTRAMUSCULAR; INTRAVENOUS; SUBCUTANEOUS at 22:00

## 2020-01-01 RX ADMIN — SODIUM CHLORIDE 40 MG: 9 INJECTION INTRAMUSCULAR; INTRAVENOUS; SUBCUTANEOUS at 09:00

## 2020-01-01 RX ADMIN — Medication 220 MG: at 09:29

## 2020-01-01 RX ADMIN — ALBUTEROL SULFATE 2.5 MG: 2.5 SOLUTION RESPIRATORY (INHALATION) at 20:05

## 2020-01-01 RX ADMIN — HEPARIN SODIUM 5000 UNITS: 5000 INJECTION INTRAVENOUS; SUBCUTANEOUS at 22:00

## 2020-01-01 RX ADMIN — SODIUM CHLORIDE 20 ML: 9 INJECTION, SOLUTION INTRAMUSCULAR; INTRAVENOUS; SUBCUTANEOUS at 06:00

## 2020-01-01 RX ADMIN — SODIUM CHLORIDE 10 ML: 9 INJECTION, SOLUTION INTRAMUSCULAR; INTRAVENOUS; SUBCUTANEOUS at 05:37

## 2020-01-01 RX ADMIN — SODIUM CHLORIDE 10 ML: 9 INJECTION, SOLUTION INTRAMUSCULAR; INTRAVENOUS; SUBCUTANEOUS at 13:01

## 2020-01-01 RX ADMIN — ALBUTEROL SULFATE 2.5 MG: 2.5 SOLUTION RESPIRATORY (INHALATION) at 02:00

## 2020-01-01 RX ADMIN — Medication 500 MG: at 09:29

## 2020-01-01 RX ADMIN — ACETAMINOPHEN 650 MG: 325 TABLET, FILM COATED ORAL at 05:55

## 2020-01-01 RX ADMIN — DEXMEDETOMIDINE 0.2 MCG/KG/HR: 100 INJECTION, SOLUTION, CONCENTRATE INTRAVENOUS at 02:35

## 2020-01-01 RX ADMIN — Medication 1000 MG: at 09:26

## 2020-01-01 RX ADMIN — SODIUM CHLORIDE 10 ML: 9 INJECTION, SOLUTION INTRAMUSCULAR; INTRAVENOUS; SUBCUTANEOUS at 22:17

## 2020-01-01 RX ADMIN — PHENOL 1 SPRAY: 1.5 LIQUID ORAL at 17:25

## 2020-01-01 RX ADMIN — HEPARIN SODIUM 5000 UNITS: 5000 INJECTION INTRAVENOUS; SUBCUTANEOUS at 21:31

## 2020-01-01 RX ADMIN — Medication 1000 MG: at 18:00

## 2020-01-01 RX ADMIN — ACETAMINOPHEN 650 MG: 325 TABLET, FILM COATED ORAL at 09:40

## 2020-01-01 RX ADMIN — DEXTROSE MONOHYDRATE 25 G: 25 INJECTION, SOLUTION INTRAVENOUS at 23:32

## 2020-01-01 RX ADMIN — SODIUM CHLORIDE 10 ML: 9 INJECTION, SOLUTION INTRAMUSCULAR; INTRAVENOUS; SUBCUTANEOUS at 06:15

## 2020-01-01 RX ADMIN — Medication 220 MG: at 09:00

## 2020-01-01 RX ADMIN — Medication 500 MG: at 18:00

## 2020-01-01 RX ADMIN — HEPARIN SODIUM 5000 UNITS: 5000 INJECTION INTRAVENOUS; SUBCUTANEOUS at 22:16

## 2020-01-01 RX ADMIN — INSULIN GLARGINE 15 UNITS: 100 INJECTION, SOLUTION SUBCUTANEOUS at 18:00

## 2020-01-01 RX ADMIN — DEXTROSE MONOHYDRATE 25 G: 500 INJECTION PARENTERAL at 07:41

## 2020-01-01 RX ADMIN — DOBUTAMINE HYDROCHLORIDE 2.5 MCG/KG/MIN: 200 INJECTION INTRAVENOUS at 13:00

## 2020-01-01 RX ADMIN — DEXAMETHASONE SODIUM PHOSPHATE 6 MG: 4 INJECTION, SOLUTION INTRAMUSCULAR; INTRAVENOUS at 08:17

## 2020-01-01 RX ADMIN — MIDAZOLAM HYDROCHLORIDE 6 MG/HR: 5 INJECTION, SOLUTION INTRAMUSCULAR; INTRAVENOUS at 23:09

## 2020-01-01 RX ADMIN — INSULIN GLARGINE 20 UNITS: 100 INJECTION, SOLUTION SUBCUTANEOUS at 18:00

## 2020-01-01 RX ADMIN — INSULIN LISPRO 5 UNITS: 100 INJECTION, SOLUTION INTRAVENOUS; SUBCUTANEOUS at 16:38

## 2020-01-01 RX ADMIN — HEPARIN SODIUM 5000 UNITS: 5000 INJECTION INTRAVENOUS; SUBCUTANEOUS at 06:45

## 2020-01-01 RX ADMIN — SODIUM CHLORIDE 10 ML: 9 INJECTION, SOLUTION INTRAMUSCULAR; INTRAVENOUS; SUBCUTANEOUS at 07:10

## 2020-01-01 RX ADMIN — INSULIN HUMAN 10 UNITS: 100 INJECTION, SOLUTION PARENTERAL at 05:48

## 2020-01-01 RX ADMIN — SODIUM CHLORIDE, SODIUM LACTATE, POTASSIUM CHLORIDE, AND CALCIUM CHLORIDE 75 ML/HR: 600; 310; 30; 20 INJECTION, SOLUTION INTRAVENOUS at 03:00

## 2020-01-01 RX ADMIN — CEFTRIAXONE SODIUM 1 G: 1 INJECTION, POWDER, FOR SOLUTION INTRAMUSCULAR; INTRAVENOUS at 14:24

## 2020-01-01 RX ADMIN — Medication 1 TUBE: at 05:32

## 2020-01-01 RX ADMIN — CEFTRIAXONE SODIUM 1 G: 1 INJECTION, POWDER, FOR SOLUTION INTRAMUSCULAR; INTRAVENOUS at 15:00

## 2020-01-01 RX ADMIN — SODIUM CHLORIDE, SODIUM LACTATE, POTASSIUM CHLORIDE, AND CALCIUM CHLORIDE 75 ML/HR: 600; 310; 30; 20 INJECTION, SOLUTION INTRAVENOUS at 03:51

## 2020-01-01 RX ADMIN — SODIUM CHLORIDE 30 ML: 9 INJECTION, SOLUTION INTRAMUSCULAR; INTRAVENOUS; SUBCUTANEOUS at 14:00

## 2020-01-01 RX ADMIN — ALBUTEROL SULFATE 2.5 MG: 2.5 SOLUTION RESPIRATORY (INHALATION) at 07:45

## 2020-01-01 RX ADMIN — DOPAMINE HYDROCHLORIDE 5 MCG/KG/MIN: 320 INJECTION, SOLUTION INTRAVENOUS at 09:54

## 2020-01-01 RX ADMIN — INSULIN LISPRO 6 UNITS: 100 INJECTION, SOLUTION INTRAVENOUS; SUBCUTANEOUS at 17:33

## 2020-01-01 RX ADMIN — INSULIN LISPRO 5 UNITS: 100 INJECTION, SOLUTION INTRAVENOUS; SUBCUTANEOUS at 16:32

## 2020-01-01 RX ADMIN — DEXAMETHASONE SODIUM PHOSPHATE 6 MG: 4 INJECTION, SOLUTION INTRAMUSCULAR; INTRAVENOUS at 08:36

## 2020-01-01 RX ADMIN — HEPARIN SODIUM 5000 UNITS: 5000 INJECTION INTRAVENOUS; SUBCUTANEOUS at 21:46

## 2020-01-01 RX ADMIN — FAMOTIDINE 20 MG: 10 INJECTION INTRAVENOUS at 08:00

## 2020-01-01 RX ADMIN — INSULIN GLARGINE 15 UNITS: 100 INJECTION, SOLUTION SUBCUTANEOUS at 17:00

## 2020-01-01 RX ADMIN — ACETAMINOPHEN 650 MG: 325 TABLET, FILM COATED ORAL at 22:20

## 2020-01-01 RX ADMIN — MIDAZOLAM HYDROCHLORIDE 4 MG/HR: 5 INJECTION, SOLUTION INTRAMUSCULAR; INTRAVENOUS at 16:54

## 2020-01-01 RX ADMIN — INSULIN LISPRO 5 UNITS: 100 INJECTION, SOLUTION INTRAVENOUS; SUBCUTANEOUS at 07:30

## 2020-01-01 RX ADMIN — HEPARIN SODIUM 5000 UNITS: 5000 INJECTION INTRAVENOUS; SUBCUTANEOUS at 21:30

## 2020-01-01 RX ADMIN — ALBUTEROL SULFATE 2.5 MG: 2.5 SOLUTION RESPIRATORY (INHALATION) at 20:08

## 2020-01-01 RX ADMIN — SODIUM CHLORIDE 20 ML: 9 INJECTION, SOLUTION INTRAMUSCULAR; INTRAVENOUS; SUBCUTANEOUS at 14:28

## 2020-01-01 RX ADMIN — SODIUM CHLORIDE 40 MG: 9 INJECTION INTRAMUSCULAR; INTRAVENOUS; SUBCUTANEOUS at 09:06

## 2020-01-01 RX ADMIN — SODIUM CHLORIDE 40 ML: 9 INJECTION, SOLUTION INTRAMUSCULAR; INTRAVENOUS; SUBCUTANEOUS at 21:29

## 2020-01-01 RX ADMIN — DEXTROSE MONOHYDRATE 5 MCG/MIN: 50 INJECTION, SOLUTION INTRAVENOUS at 13:38

## 2020-01-01 RX ADMIN — Medication 220 MG: at 09:06

## 2020-01-01 RX ADMIN — ALBUTEROL SULFATE 2.5 MG: 2.5 SOLUTION RESPIRATORY (INHALATION) at 15:04

## 2020-01-01 RX ADMIN — INSULIN GLARGINE 10 UNITS: 100 INJECTION, SOLUTION SUBCUTANEOUS at 23:00

## 2020-01-01 RX ADMIN — Medication 1 EACH: at 21:29

## 2020-01-01 RX ADMIN — DOPAMINE HYDROCHLORIDE 13.5 MCG/KG/MIN: 320 INJECTION, SOLUTION INTRAVENOUS at 04:09

## 2020-01-01 RX ADMIN — HEPARIN SODIUM 5000 UNITS: 5000 INJECTION INTRAVENOUS; SUBCUTANEOUS at 14:57

## 2020-01-01 RX ADMIN — ALBUTEROL SULFATE 2.5 MG: 2.5 SOLUTION RESPIRATORY (INHALATION) at 23:16

## 2020-01-01 RX ADMIN — VITAMIN D, TAB 1000IU (100/BT) 5 TABLET: 25 TAB at 09:26

## 2020-01-01 RX ADMIN — SODIUM ZIRCONIUM CYCLOSILICATE 5 G: 5 POWDER, FOR SUSPENSION ORAL at 13:07

## 2020-01-01 RX ADMIN — HEPARIN SODIUM 5000 UNITS: 5000 INJECTION INTRAVENOUS; SUBCUTANEOUS at 16:33

## 2020-01-01 RX ADMIN — ALBUTEROL SULFATE 2.5 MG: 2.5 SOLUTION RESPIRATORY (INHALATION) at 09:15

## 2020-01-01 RX ADMIN — HEPARIN SODIUM 5000 UNITS: 5000 INJECTION INTRAVENOUS; SUBCUTANEOUS at 13:44

## 2020-01-01 RX ADMIN — ALBUTEROL SULFATE 2.5 MG: 2.5 SOLUTION RESPIRATORY (INHALATION) at 09:05

## 2020-01-01 RX ADMIN — ALBUTEROL SULFATE 2.5 MG: 2.5 SOLUTION RESPIRATORY (INHALATION) at 02:29

## 2020-01-01 RX ADMIN — Medication 1 EACH: at 17:23

## 2020-01-01 RX ADMIN — PHENOL 1 SPRAY: 1.5 LIQUID ORAL at 05:49

## 2020-01-01 RX ADMIN — INSULIN LISPRO 6 UNITS: 100 INJECTION, SOLUTION INTRAVENOUS; SUBCUTANEOUS at 23:28

## 2020-01-01 RX ADMIN — SODIUM CHLORIDE 40 MG: 9 INJECTION INTRAMUSCULAR; INTRAVENOUS; SUBCUTANEOUS at 09:30

## 2020-01-01 RX ADMIN — Medication 1000 MG: at 08:00

## 2020-01-01 RX ADMIN — HYDRALAZINE HYDROCHLORIDE 20 MG: 20 INJECTION INTRAMUSCULAR; INTRAVENOUS at 18:03

## 2020-01-01 RX ADMIN — ALBUTEROL SULFATE 2.5 MG: 2.5 SOLUTION RESPIRATORY (INHALATION) at 03:40

## 2020-01-01 RX ADMIN — CEFTRIAXONE SODIUM 1 G: 1 INJECTION, POWDER, FOR SOLUTION INTRAMUSCULAR; INTRAVENOUS at 14:56

## 2020-01-01 RX ADMIN — Medication 1000 MG: at 08:36

## 2020-01-01 RX ADMIN — ERGOCALCIFEROL 50000 UNITS: 1.25 CAPSULE ORAL at 09:08

## 2020-01-01 RX ADMIN — DEXAMETHASONE SODIUM PHOSPHATE 6 MG: 4 INJECTION, SOLUTION INTRAMUSCULAR; INTRAVENOUS at 09:27

## 2020-01-01 RX ADMIN — HEPARIN SODIUM 5000 UNITS: 5000 INJECTION INTRAVENOUS; SUBCUTANEOUS at 07:10

## 2020-01-01 RX ADMIN — ALBUTEROL SULFATE 2.5 MG: 2.5 SOLUTION RESPIRATORY (INHALATION) at 15:26

## 2020-01-01 RX ADMIN — DEXAMETHASONE SODIUM PHOSPHATE 10 MG: 10 INJECTION INTRAMUSCULAR; INTRAVENOUS at 15:17

## 2020-01-01 RX ADMIN — VITAMIN D, TAB 1000IU (100/BT) 5 TABLET: 25 TAB at 08:36

## 2020-01-01 RX ADMIN — FENTANYL CITRATE 100 MCG: 50 INJECTION, SOLUTION INTRAMUSCULAR; INTRAVENOUS at 08:03

## 2020-01-01 RX ADMIN — AZITHROMYCIN MONOHYDRATE 250 MG: 250 TABLET ORAL at 08:36

## 2020-01-01 RX ADMIN — SODIUM CHLORIDE 10 ML: 9 INJECTION, SOLUTION INTRAMUSCULAR; INTRAVENOUS; SUBCUTANEOUS at 01:15

## 2020-01-01 RX ADMIN — INSULIN GLARGINE 20 UNITS: 100 INJECTION, SOLUTION SUBCUTANEOUS at 09:00

## 2020-01-01 RX ADMIN — ALBUTEROL SULFATE 2.5 MG: 2.5 SOLUTION RESPIRATORY (INHALATION) at 20:35

## 2020-01-01 RX ADMIN — SODIUM CHLORIDE 10 ML: 9 INJECTION, SOLUTION INTRAMUSCULAR; INTRAVENOUS; SUBCUTANEOUS at 21:46

## 2020-01-01 RX ADMIN — AZITHROMYCIN 250 MG: 500 INJECTION, POWDER, LYOPHILIZED, FOR SOLUTION INTRAVENOUS at 14:57

## 2020-01-01 RX ADMIN — AZITHROMYCIN MONOHYDRATE 250 MG: 250 TABLET ORAL at 09:40

## 2020-01-01 RX ADMIN — HEPARIN SODIUM 5000 UNITS: 5000 INJECTION INTRAVENOUS; SUBCUTANEOUS at 13:01

## 2020-01-01 RX ADMIN — INSULIN LISPRO 6 UNITS: 100 INJECTION, SOLUTION INTRAVENOUS; SUBCUTANEOUS at 18:02

## 2020-01-01 RX ADMIN — DOPAMINE HYDROCHLORIDE 15 MCG/KG/MIN: 320 INJECTION, SOLUTION INTRAVENOUS at 13:56

## 2020-01-01 RX ADMIN — DEXAMETHASONE SODIUM PHOSPHATE 6 MG: 4 INJECTION, SOLUTION INTRAMUSCULAR; INTRAVENOUS at 08:00

## 2020-01-01 RX ADMIN — HEPARIN SODIUM 5000 UNITS: 5000 INJECTION INTRAVENOUS; SUBCUTANEOUS at 14:00

## 2020-01-01 RX ADMIN — Medication 500 MG: at 08:17

## 2020-01-01 RX ADMIN — Medication 1000 MG: at 09:06

## 2020-01-01 RX ADMIN — SODIUM CHLORIDE 40 MG: 9 INJECTION INTRAMUSCULAR; INTRAVENOUS; SUBCUTANEOUS at 12:13

## 2020-01-01 RX ADMIN — ALBUTEROL SULFATE 2.5 MG: 2.5 SOLUTION RESPIRATORY (INHALATION) at 22:05

## 2020-01-01 RX ADMIN — TUBERCULIN PURIFIED PROTEIN DERIVATIVE 5 UNITS: 5 INJECTION INTRADERMAL at 21:00

## 2020-01-01 RX ADMIN — INSULIN LISPRO 9 UNITS: 100 INJECTION, SOLUTION INTRAVENOUS; SUBCUTANEOUS at 22:16

## 2020-01-01 RX ADMIN — SODIUM CHLORIDE, SODIUM LACTATE, POTASSIUM CHLORIDE, AND CALCIUM CHLORIDE 75 ML/HR: 600; 310; 30; 20 INJECTION, SOLUTION INTRAVENOUS at 14:24

## 2020-01-01 RX ADMIN — ZOLPIDEM TARTRATE 5 MG: 5 TABLET ORAL at 22:16

## 2020-01-01 RX ADMIN — HUMAN INSULIN 20 UNITS: 100 INJECTION, SOLUTION SUBCUTANEOUS at 23:00

## 2020-01-01 RX ADMIN — HEPARIN SODIUM 5000 UNITS: 5000 INJECTION INTRAVENOUS; SUBCUTANEOUS at 14:23

## 2020-01-01 RX ADMIN — SODIUM CHLORIDE 1000 ML: 900 INJECTION, SOLUTION INTRAVENOUS at 14:52

## 2020-01-01 RX ADMIN — SODIUM CHLORIDE 40 MG: 9 INJECTION INTRAMUSCULAR; INTRAVENOUS; SUBCUTANEOUS at 08:35

## 2020-01-01 RX ADMIN — SODIUM CHLORIDE 30 ML: 9 INJECTION, SOLUTION INTRAMUSCULAR; INTRAVENOUS; SUBCUTANEOUS at 22:00

## 2020-01-01 RX ADMIN — INSULIN LISPRO 3 UNITS: 100 INJECTION, SOLUTION INTRAVENOUS; SUBCUTANEOUS at 12:12

## 2020-01-01 RX ADMIN — HYDRALAZINE HYDROCHLORIDE 10 MG: 10 TABLET, FILM COATED ORAL at 22:14

## 2020-01-01 RX ADMIN — VITAMIN D, TAB 1000IU (100/BT) 2 TABLET: 25 TAB at 09:29

## 2020-01-01 RX ADMIN — ALBUTEROL SULFATE 2.5 MG: 2.5 SOLUTION RESPIRATORY (INHALATION) at 13:47

## 2020-01-01 RX ADMIN — HALOPERIDOL LACTATE 2.5 MG: 5 INJECTION, SOLUTION INTRAMUSCULAR at 00:51

## 2020-01-01 RX ADMIN — INSULIN LISPRO 3 UNITS: 100 INJECTION, SOLUTION INTRAVENOUS; SUBCUTANEOUS at 11:30

## 2020-01-01 RX ADMIN — ALBUTEROL SULFATE 2.5 MG: 2.5 SOLUTION RESPIRATORY (INHALATION) at 04:00

## 2020-01-01 RX ADMIN — Medication 1 EACH: at 20:08

## 2020-01-01 RX ADMIN — SODIUM CHLORIDE 10 ML: 9 INJECTION, SOLUTION INTRAMUSCULAR; INTRAVENOUS; SUBCUTANEOUS at 22:00

## 2020-01-01 RX ADMIN — ALBUTEROL SULFATE 2.5 MG: 2.5 SOLUTION RESPIRATORY (INHALATION) at 07:54

## 2020-01-01 RX ADMIN — Medication 220 MG: at 09:26

## 2020-01-01 RX ADMIN — INSULIN LISPRO 3 UNITS: 100 INJECTION, SOLUTION INTRAVENOUS; SUBCUTANEOUS at 18:00

## 2020-01-01 RX ADMIN — HYDRALAZINE HYDROCHLORIDE 10 MG: 10 TABLET, FILM COATED ORAL at 12:23

## 2020-01-01 RX ADMIN — ALBUTEROL SULFATE 2.5 MG: 2.5 SOLUTION RESPIRATORY (INHALATION) at 15:01

## 2020-01-01 RX ADMIN — Medication 220 MG: at 08:36

## 2020-01-01 RX ADMIN — SODIUM CHLORIDE 10 ML: 9 INJECTION, SOLUTION INTRAMUSCULAR; INTRAVENOUS; SUBCUTANEOUS at 14:00

## 2020-01-01 RX ADMIN — SODIUM CHLORIDE 10 ML: 9 INJECTION, SOLUTION INTRAMUSCULAR; INTRAVENOUS; SUBCUTANEOUS at 06:00

## 2020-01-01 RX ADMIN — HEPARIN SODIUM 5000 UNITS: 5000 INJECTION INTRAVENOUS; SUBCUTANEOUS at 06:00

## 2020-01-01 RX ADMIN — ALBUTEROL SULFATE 2.5 MG: 2.5 SOLUTION RESPIRATORY (INHALATION) at 20:16

## 2020-01-01 RX ADMIN — ACETAMINOPHEN 650 MG: 325 TABLET, FILM COATED ORAL at 17:23

## 2020-01-01 RX ADMIN — SODIUM CHLORIDE, SODIUM LACTATE, POTASSIUM CHLORIDE, AND CALCIUM CHLORIDE 75 ML/HR: 600; 310; 30; 20 INJECTION, SOLUTION INTRAVENOUS at 11:48

## 2020-01-01 RX ADMIN — AZITHROMYCIN MONOHYDRATE 250 MG: 250 TABLET ORAL at 09:06

## 2020-01-01 RX ADMIN — AZITHROMYCIN MONOHYDRATE 250 MG: 250 TABLET ORAL at 12:27

## 2020-01-01 RX ADMIN — SODIUM CHLORIDE 40 MG: 9 INJECTION INTRAMUSCULAR; INTRAVENOUS; SUBCUTANEOUS at 09:26

## 2020-01-01 RX ADMIN — INSULIN LISPRO 5 UNITS: 100 INJECTION, SOLUTION INTRAVENOUS; SUBCUTANEOUS at 11:22

## 2020-01-01 RX ADMIN — Medication 220 MG: at 08:18

## 2020-01-01 RX ADMIN — ALBUTEROL SULFATE 5 MG: 2.5 SOLUTION RESPIRATORY (INHALATION) at 13:56

## 2020-01-01 RX ADMIN — Medication 220 MG: at 09:40

## 2020-01-01 RX ADMIN — INSULIN LISPRO 3 UNITS: 100 INJECTION, SOLUTION INTRAVENOUS; SUBCUTANEOUS at 21:45

## 2020-01-01 RX ADMIN — ALBUTEROL SULFATE 2.5 MG: 2.5 SOLUTION RESPIRATORY (INHALATION) at 14:39

## 2020-01-01 RX ADMIN — CEFTRIAXONE SODIUM 1 G: 1 INJECTION, POWDER, FOR SOLUTION INTRAMUSCULAR; INTRAVENOUS at 16:33

## 2020-01-01 RX ADMIN — Medication 100 MCG/HR: at 02:27

## 2020-01-01 RX ADMIN — HEPARIN SODIUM 5000 UNITS: 5000 INJECTION INTRAVENOUS; SUBCUTANEOUS at 05:36

## 2020-01-01 RX ADMIN — AZITHROMYCIN 500 MG: 500 INJECTION, POWDER, LYOPHILIZED, FOR SOLUTION INTRAVENOUS at 15:26

## 2020-01-01 RX ADMIN — INSULIN GLARGINE 15 UNITS: 100 INJECTION, SOLUTION SUBCUTANEOUS at 09:00

## 2020-01-01 RX ADMIN — INSULIN LISPRO 4 UNITS: 100 INJECTION, SOLUTION INTRAVENOUS; SUBCUTANEOUS at 22:00

## 2020-01-01 RX ADMIN — Medication 1000 MG: at 17:33

## 2020-01-01 RX ADMIN — INSULIN GLARGINE 15 UNITS: 100 INJECTION, SOLUTION SUBCUTANEOUS at 05:47

## 2020-01-01 RX ADMIN — Medication 1000 MG: at 18:02

## 2020-01-01 RX ADMIN — ALBUTEROL SULFATE 2.5 MG: 2.5 SOLUTION RESPIRATORY (INHALATION) at 19:18

## 2020-01-01 RX ADMIN — ALBUTEROL SULFATE 2.5 MG: 2.5 SOLUTION RESPIRATORY (INHALATION) at 14:08

## 2020-01-01 RX ADMIN — INSULIN HUMAN 10 UNITS: 100 INJECTION, SOLUTION PARENTERAL at 18:00

## 2020-01-01 RX ADMIN — VITAMIN D, TAB 1000IU (100/BT) 5 TABLET: 25 TAB at 08:00

## 2020-01-01 RX ADMIN — MIDAZOLAM HYDROCHLORIDE 4 MG/HR: 5 INJECTION, SOLUTION INTRAMUSCULAR; INTRAVENOUS at 09:00

## 2020-01-01 RX ADMIN — ALBUTEROL SULFATE 2.5 MG: 2.5 SOLUTION RESPIRATORY (INHALATION) at 02:21

## 2020-01-01 RX ADMIN — INSULIN LISPRO 3 UNITS: 100 INJECTION, SOLUTION INTRAVENOUS; SUBCUTANEOUS at 08:36

## 2020-01-01 RX ADMIN — VITAMIN D, TAB 1000IU (100/BT) 5 TABLET: 25 TAB at 09:06

## 2020-01-01 RX ADMIN — Medication 1000 MG: at 09:40

## 2020-01-01 RX ADMIN — SODIUM ZIRCONIUM CYCLOSILICATE 10 G: 10 POWDER, FOR SUSPENSION ORAL at 12:23

## 2020-01-01 RX ADMIN — SODIUM CHLORIDE 500 ML: 900 INJECTION, SOLUTION INTRAVENOUS at 14:05

## 2020-01-01 RX ADMIN — SODIUM CHLORIDE 500 ML: 900 INJECTION, SOLUTION INTRAVENOUS at 14:06

## 2020-01-01 RX ADMIN — INSULIN LISPRO 9 UNITS: 100 INJECTION, SOLUTION INTRAVENOUS; SUBCUTANEOUS at 11:57

## 2020-01-01 RX ADMIN — CALCIUM GLUCONATE 1000 MG: 20 INJECTION, SOLUTION INTRAVENOUS at 11:28

## 2020-01-01 RX ADMIN — CEFTRIAXONE SODIUM 1 G: 1 INJECTION, POWDER, FOR SOLUTION INTRAMUSCULAR; INTRAVENOUS at 15:17

## 2020-01-01 RX ADMIN — SODIUM CHLORIDE 10 ML: 9 INJECTION, SOLUTION INTRAMUSCULAR; INTRAVENOUS; SUBCUTANEOUS at 13:46

## 2020-01-01 RX ADMIN — DEXAMETHASONE SODIUM PHOSPHATE 6 MG: 4 INJECTION, SOLUTION INTRAMUSCULAR; INTRAVENOUS at 09:39

## 2020-01-01 RX ADMIN — ERGOCALCIFEROL 50000 UNITS: 1.25 CAPSULE ORAL at 12:13

## 2020-01-01 RX ADMIN — HEPARIN SODIUM 5000 UNITS: 5000 INJECTION INTRAVENOUS; SUBCUTANEOUS at 20:11

## 2020-01-01 RX ADMIN — HEPARIN SODIUM 5000 UNITS: 5000 INJECTION INTRAVENOUS; SUBCUTANEOUS at 06:14

## 2020-01-01 RX ADMIN — SODIUM CHLORIDE 20 ML: 9 INJECTION, SOLUTION INTRAMUSCULAR; INTRAVENOUS; SUBCUTANEOUS at 14:57

## 2020-01-01 RX ADMIN — SODIUM CHLORIDE 20 ML: 9 INJECTION, SOLUTION INTRAMUSCULAR; INTRAVENOUS; SUBCUTANEOUS at 06:45

## 2020-03-08 ENCOUNTER — APPOINTMENT (OUTPATIENT)
Dept: GENERAL RADIOLOGY | Age: 80
DRG: 638 | End: 2020-03-08
Attending: EMERGENCY MEDICINE
Payer: MEDICARE

## 2020-03-08 ENCOUNTER — HOSPITAL ENCOUNTER (INPATIENT)
Age: 80
LOS: 8 days | Discharge: HOME | DRG: 638 | End: 2020-03-16
Attending: EMERGENCY MEDICINE | Admitting: HOSPITALIST
Payer: MEDICARE

## 2020-03-08 DIAGNOSIS — E10.10 DIABETIC KETOACIDOSIS WITHOUT COMA ASSOCIATED WITH TYPE 1 DIABETES MELLITUS (HCC): Primary | ICD-10-CM

## 2020-03-08 DIAGNOSIS — T68.XXXA HYPOTHERMIA, INITIAL ENCOUNTER: ICD-10-CM

## 2020-03-08 DIAGNOSIS — E87.5 ACUTE HYPERKALEMIA: ICD-10-CM

## 2020-03-08 PROBLEM — E88.09 HYPOALBUMINEMIA DUE TO PROTEIN-CALORIE MALNUTRITION (HCC): Status: ACTIVE | Noted: 2020-03-08

## 2020-03-08 PROBLEM — E86.0 DEHYDRATION: Status: ACTIVE | Noted: 2020-03-08

## 2020-03-08 PROBLEM — E87.20 METABOLIC ACIDOSIS: Status: ACTIVE | Noted: 2020-03-08

## 2020-03-08 PROBLEM — Z91.199 NONCOMPLIANCE: Status: ACTIVE | Noted: 2020-03-08

## 2020-03-08 PROBLEM — E46 HYPOALBUMINEMIA DUE TO PROTEIN-CALORIE MALNUTRITION (HCC): Status: ACTIVE | Noted: 2020-03-08

## 2020-03-08 PROBLEM — E11.10 DIABETIC KETOACIDOSIS (HCC): Status: ACTIVE | Noted: 2020-03-08

## 2020-03-08 LAB
ADMINISTERED INITIALS, ADMINIT: NORMAL
ALBUMIN SERPL-MCNC: 1 G/DL (ref 3.2–4.6)
ALBUMIN/GLOB SERPL: 0.1 {RATIO} (ref 1.2–3.5)
ALP SERPL-CCNC: 157 U/L (ref 50–136)
ALT SERPL-CCNC: 27 U/L (ref 12–65)
ANION GAP SERPL CALC-SCNC: 20 MMOL/L (ref 7–16)
ANION GAP SERPL CALC-SCNC: 5 MMOL/L (ref 7–16)
ANION GAP SERPL CALC-SCNC: 8 MMOL/L (ref 7–16)
APPEARANCE UR: ABNORMAL
AST SERPL-CCNC: 25 U/L (ref 15–37)
ATRIAL RATE: 57 BPM
BACTERIA URNS QL MICRO: ABNORMAL /HPF
BACTERIA URNS QL MICRO: ABNORMAL /HPF
BASOPHILS # BLD: 0 K/UL (ref 0–0.2)
BASOPHILS NFR BLD: 0 % (ref 0–2)
BILIRUB SERPL-MCNC: 0.4 MG/DL (ref 0.2–1.1)
BILIRUB UR QL: NEGATIVE
BUN SERPL-MCNC: 19 MG/DL (ref 8–23)
BUN SERPL-MCNC: 21 MG/DL (ref 8–23)
BUN SERPL-MCNC: 21 MG/DL (ref 8–23)
CALCIUM SERPL-MCNC: 8.3 MG/DL (ref 8.3–10.4)
CALCIUM SERPL-MCNC: 8.6 MG/DL (ref 8.3–10.4)
CALCIUM SERPL-MCNC: 8.8 MG/DL (ref 8.3–10.4)
CALCULATED P AXIS, ECG09: 8 DEGREES
CALCULATED R AXIS, ECG10: 8 DEGREES
CALCULATED T AXIS, ECG11: -67 DEGREES
CASTS URNS QL MICRO: 0 /LPF
CHLORIDE SERPL-SCNC: 100 MMOL/L (ref 98–107)
CHLORIDE SERPL-SCNC: 104 MMOL/L (ref 98–107)
CHLORIDE SERPL-SCNC: 108 MMOL/L (ref 98–107)
CO2 SERPL-SCNC: 15 MMOL/L (ref 21–32)
CO2 SERPL-SCNC: 27 MMOL/L (ref 21–32)
CO2 SERPL-SCNC: 31 MMOL/L (ref 21–32)
COLOR UR: YELLOW
CREAT SERPL-MCNC: 1.31 MG/DL (ref 0.6–1)
CREAT SERPL-MCNC: 1.53 MG/DL (ref 0.6–1)
CREAT SERPL-MCNC: 1.63 MG/DL (ref 0.6–1)
CRYSTALS URNS QL MICRO: 0 /LPF
D50 ADMINISTERED, D50ADM: 0 ML
D50 ORDER, D50ORD: 0 ML
DIAGNOSIS, 93000: NORMAL
DIFFERENTIAL METHOD BLD: ABNORMAL
EOSINOPHIL # BLD: 0 K/UL (ref 0–0.8)
EOSINOPHIL NFR BLD: 0 % (ref 0.5–7.8)
EPI CELLS #/AREA URNS HPF: ABNORMAL /HPF
ERYTHROCYTE [DISTWIDTH] IN BLOOD BY AUTOMATED COUNT: 13.1 % (ref 11.9–14.6)
EST. AVERAGE GLUCOSE BLD GHB EST-MCNC: 318 MG/DL
GLOBULIN SER CALC-MCNC: 6.8 G/DL (ref 2.3–3.5)
GLSCOM COMMENTS: NORMAL
GLUCOSE BLD STRIP.AUTO-MCNC: 107 MG/DL (ref 65–100)
GLUCOSE BLD STRIP.AUTO-MCNC: 127 MG/DL (ref 65–100)
GLUCOSE BLD STRIP.AUTO-MCNC: 128 MG/DL (ref 65–100)
GLUCOSE BLD STRIP.AUTO-MCNC: 251 MG/DL (ref 65–100)
GLUCOSE BLD STRIP.AUTO-MCNC: 419 MG/DL (ref 65–100)
GLUCOSE BLD STRIP.AUTO-MCNC: 479 MG/DL (ref 65–100)
GLUCOSE BLD STRIP.AUTO-MCNC: 544 MG/DL (ref 65–100)
GLUCOSE BLD STRIP.AUTO-MCNC: 84 MG/DL (ref 65–100)
GLUCOSE SERPL-MCNC: 105 MG/DL (ref 65–100)
GLUCOSE SERPL-MCNC: 391 MG/DL (ref 65–100)
GLUCOSE SERPL-MCNC: 545 MG/DL (ref 65–100)
GLUCOSE UR STRIP.AUTO-MCNC: NEGATIVE MG/DL
GLUCOSE, GLC: 107 MG/DL
GLUCOSE, GLC: 127 MG/DL
GLUCOSE, GLC: 128 MG/DL
GLUCOSE, GLC: 251 MG/DL
GLUCOSE, GLC: 419 MG/DL
GLUCOSE, GLC: 479 MG/DL
GLUCOSE, GLC: 84 MG/DL
HBA1C MFR BLD: 12.7 % (ref 4.8–6)
HCT VFR BLD AUTO: 36.3 % (ref 35.8–46.3)
HGB BLD-MCNC: 11.8 G/DL (ref 11.7–15.4)
HGB UR QL STRIP: ABNORMAL
HIGH TARGET, HITG: 250 MG/DL
IMM GRANULOCYTES # BLD AUTO: 0.1 K/UL (ref 0–0.5)
IMM GRANULOCYTES NFR BLD AUTO: 1 % (ref 0–5)
INSULIN ADMINSTERED, INSADM: 0 UNITS/HOUR
INSULIN ADMINSTERED, INSADM: 0 UNITS/HOUR
INSULIN ADMINSTERED, INSADM: 0.2 UNITS/HOUR
INSULIN ADMINSTERED, INSADM: 1.3 UNITS/HOUR
INSULIN ADMINSTERED, INSADM: 10.8 UNITS/HOUR
INSULIN ADMINSTERED, INSADM: 5.7 UNITS/HOUR
INSULIN ADMINSTERED, INSADM: 8.4 UNITS/HOUR
INSULIN ORDER, INSORD: 0 UNITS/HOUR
INSULIN ORDER, INSORD: 0 UNITS/HOUR
INSULIN ORDER, INSORD: 0.2 UNITS/HOUR
INSULIN ORDER, INSORD: 1.3 UNITS/HOUR
INSULIN ORDER, INSORD: 10.8 UNITS/HOUR
INSULIN ORDER, INSORD: 5.7 UNITS/HOUR
INSULIN ORDER, INSORD: 8.4 UNITS/HOUR
KETONES UR QL STRIP.AUTO: NEGATIVE MG/DL
LACTATE SERPL-SCNC: 2.1 MMOL/L (ref 0.4–2)
LEUKOCYTE ESTERASE UR QL STRIP.AUTO: ABNORMAL
LOW TARGET, LOT: 150 MG/DL
LYMPHOCYTES # BLD: 1.2 K/UL (ref 0.5–4.6)
LYMPHOCYTES NFR BLD: 12 % (ref 13–44)
MAGNESIUM SERPL-MCNC: 2.4 MG/DL (ref 1.8–2.4)
MAGNESIUM SERPL-MCNC: 2.4 MG/DL (ref 1.8–2.4)
MCH RBC QN AUTO: 29.7 PG (ref 26.1–32.9)
MCHC RBC AUTO-ENTMCNC: 32.5 G/DL (ref 31.4–35)
MCV RBC AUTO: 91.4 FL (ref 79.6–97.8)
MINUTES UNTIL NEXT BG, NBG: 60 MIN
MONOCYTES # BLD: 0.6 K/UL (ref 0.1–1.3)
MONOCYTES NFR BLD: 6 % (ref 4–12)
MUCOUS THREADS URNS QL MICRO: 0 /LPF
MULTIPLIER, MUL: 0
MULTIPLIER, MUL: 0
MULTIPLIER, MUL: 0.01
MULTIPLIER, MUL: 0.02
MULTIPLIER, MUL: 0.02
MULTIPLIER, MUL: 0.03
MULTIPLIER, MUL: 0.03
NEUTS SEG # BLD: 8.5 K/UL (ref 1.7–8.2)
NEUTS SEG NFR BLD: 81 % (ref 43–78)
NITRITE UR QL STRIP.AUTO: NEGATIVE
NRBC # BLD: 0 K/UL (ref 0–0.2)
ORDER INITIALS, ORDINIT: NORMAL
OTHER OBSERVATIONS,UCOM: ABNORMAL
OTHER OBSERVATIONS,UCOM: ABNORMAL
P-R INTERVAL, ECG05: 200 MS
PH UR STRIP: 6 [PH] (ref 5–9)
PHOSPHATE SERPL-MCNC: 1.8 MG/DL (ref 2.3–3.7)
PLATELET # BLD AUTO: 288 K/UL (ref 150–450)
PMV BLD AUTO: 10.6 FL (ref 9.4–12.3)
POTASSIUM SERPL-SCNC: 3.8 MMOL/L (ref 3.5–5.1)
POTASSIUM SERPL-SCNC: 3.9 MMOL/L (ref 3.5–5.1)
POTASSIUM SERPL-SCNC: 5.8 MMOL/L (ref 3.5–5.1)
PROT SERPL-MCNC: 7.8 G/DL (ref 6.3–8.2)
PROT UR STRIP-MCNC: 100 MG/DL
Q-T INTERVAL, ECG07: 476 MS
QRS DURATION, ECG06: 82 MS
QTC CALCULATION (BEZET), ECG08: 463 MS
RBC # BLD AUTO: 3.97 M/UL (ref 4.05–5.2)
RBC #/AREA URNS HPF: ABNORMAL /HPF
RBC #/AREA URNS HPF: ABNORMAL /HPF
SODIUM SERPL-SCNC: 135 MMOL/L (ref 136–145)
SODIUM SERPL-SCNC: 140 MMOL/L (ref 136–145)
SODIUM SERPL-SCNC: 143 MMOL/L (ref 136–145)
SP GR UR REFRACTOMETRY: 1.02 (ref 1–1.02)
TROPONIN I SERPL-MCNC: <0.02 NG/ML (ref 0.02–0.05)
UROBILINOGEN UR QL STRIP.AUTO: 1 EU/DL (ref 0.2–1)
VENTRICULAR RATE, ECG03: 57 BPM
WBC # BLD AUTO: 10.4 K/UL (ref 4.3–11.1)
WBC URNS QL MICRO: ABNORMAL /HPF
WBC URNS QL MICRO: ABNORMAL /HPF
YEAST URNS QL MICRO: ABNORMAL
YEAST URNS QL MICRO: ABNORMAL

## 2020-03-08 PROCEDURE — 71045 X-RAY EXAM CHEST 1 VIEW: CPT

## 2020-03-08 PROCEDURE — 87040 BLOOD CULTURE FOR BACTERIA: CPT

## 2020-03-08 PROCEDURE — 83605 ASSAY OF LACTIC ACID: CPT

## 2020-03-08 PROCEDURE — 80048 BASIC METABOLIC PNL TOTAL CA: CPT

## 2020-03-08 PROCEDURE — 99285 EMERGENCY DEPT VISIT HI MDM: CPT

## 2020-03-08 PROCEDURE — 65610000001 HC ROOM ICU GENERAL

## 2020-03-08 PROCEDURE — 81015 MICROSCOPIC EXAM OF URINE: CPT

## 2020-03-08 PROCEDURE — 81003 URINALYSIS AUTO W/O SCOPE: CPT

## 2020-03-08 PROCEDURE — 83036 HEMOGLOBIN GLYCOSYLATED A1C: CPT

## 2020-03-08 PROCEDURE — 83735 ASSAY OF MAGNESIUM: CPT

## 2020-03-08 PROCEDURE — 74011636637 HC RX REV CODE- 636/637: Performed by: INTERNAL MEDICINE

## 2020-03-08 PROCEDURE — 36415 COLL VENOUS BLD VENIPUNCTURE: CPT

## 2020-03-08 PROCEDURE — 87086 URINE CULTURE/COLONY COUNT: CPT

## 2020-03-08 PROCEDURE — 74011000258 HC RX REV CODE- 258: Performed by: INTERNAL MEDICINE

## 2020-03-08 PROCEDURE — 80053 COMPREHEN METABOLIC PANEL: CPT

## 2020-03-08 PROCEDURE — 74011636637 HC RX REV CODE- 636/637: Performed by: EMERGENCY MEDICINE

## 2020-03-08 PROCEDURE — 84100 ASSAY OF PHOSPHORUS: CPT

## 2020-03-08 PROCEDURE — 94761 N-INVAS EAR/PLS OXIMETRY MLT: CPT

## 2020-03-08 PROCEDURE — 93005 ELECTROCARDIOGRAM TRACING: CPT | Performed by: EMERGENCY MEDICINE

## 2020-03-08 PROCEDURE — 87106 FUNGI IDENTIFICATION YEAST: CPT

## 2020-03-08 PROCEDURE — 74011250636 HC RX REV CODE- 250/636: Performed by: HOSPITALIST

## 2020-03-08 PROCEDURE — 51702 INSERT TEMP BLADDER CATH: CPT

## 2020-03-08 PROCEDURE — 82962 GLUCOSE BLOOD TEST: CPT

## 2020-03-08 PROCEDURE — 74011250636 HC RX REV CODE- 250/636: Performed by: EMERGENCY MEDICINE

## 2020-03-08 PROCEDURE — 74011000258 HC RX REV CODE- 258: Performed by: HOSPITALIST

## 2020-03-08 PROCEDURE — 74011000258 HC RX REV CODE- 258: Performed by: EMERGENCY MEDICINE

## 2020-03-08 PROCEDURE — 74011250637 HC RX REV CODE- 250/637: Performed by: HOSPITALIST

## 2020-03-08 PROCEDURE — 85025 COMPLETE CBC W/AUTO DIFF WBC: CPT

## 2020-03-08 PROCEDURE — 96360 HYDRATION IV INFUSION INIT: CPT

## 2020-03-08 PROCEDURE — 84484 ASSAY OF TROPONIN QUANT: CPT

## 2020-03-08 RX ORDER — INSULIN GLARGINE 100 [IU]/ML
30 INJECTION, SOLUTION SUBCUTANEOUS
Status: DISCONTINUED | OUTPATIENT
Start: 2020-03-08 | End: 2020-03-09

## 2020-03-08 RX ORDER — HEPARIN SODIUM 5000 [USP'U]/ML
5000 INJECTION, SOLUTION INTRAVENOUS; SUBCUTANEOUS EVERY 12 HOURS
Status: DISCONTINUED | OUTPATIENT
Start: 2020-03-08 | End: 2020-03-16 | Stop reason: HOSPADM

## 2020-03-08 RX ORDER — ROPINIROLE 0.5 MG/1
0.5 TABLET, FILM COATED ORAL
Status: DISCONTINUED | OUTPATIENT
Start: 2020-03-08 | End: 2020-03-16 | Stop reason: HOSPADM

## 2020-03-08 RX ORDER — CLOPIDOGREL BISULFATE 75 MG/1
75 TABLET ORAL DAILY
Status: DISCONTINUED | OUTPATIENT
Start: 2020-03-09 | End: 2020-03-16 | Stop reason: HOSPADM

## 2020-03-08 RX ORDER — AMOXICILLIN 250 MG
1 CAPSULE ORAL DAILY
Status: DISCONTINUED | OUTPATIENT
Start: 2020-03-09 | End: 2020-03-16 | Stop reason: HOSPADM

## 2020-03-08 RX ORDER — METHOCARBAMOL 500 MG/1
500 TABLET, FILM COATED ORAL
Status: DISCONTINUED | OUTPATIENT
Start: 2020-03-08 | End: 2020-03-16 | Stop reason: HOSPADM

## 2020-03-08 RX ORDER — SODIUM CHLORIDE 9 MG/ML
125 INJECTION, SOLUTION INTRAVENOUS CONTINUOUS
Status: DISCONTINUED | OUTPATIENT
Start: 2020-03-08 | End: 2020-03-08

## 2020-03-08 RX ORDER — INSULIN LISPRO 100 [IU]/ML
INJECTION, SOLUTION INTRAVENOUS; SUBCUTANEOUS
Status: DISCONTINUED | OUTPATIENT
Start: 2020-03-09 | End: 2020-03-16 | Stop reason: HOSPADM

## 2020-03-08 RX ORDER — LOSARTAN POTASSIUM 50 MG/1
100 TABLET ORAL DAILY
Status: DISCONTINUED | OUTPATIENT
Start: 2020-03-08 | End: 2020-03-16 | Stop reason: HOSPADM

## 2020-03-08 RX ORDER — ROSUVASTATIN CALCIUM 20 MG/1
20 TABLET, COATED ORAL
Status: DISCONTINUED | OUTPATIENT
Start: 2020-03-08 | End: 2020-03-16 | Stop reason: HOSPADM

## 2020-03-08 RX ORDER — METOPROLOL TARTRATE 25 MG/1
12.5 TABLET, FILM COATED ORAL EVERY 12 HOURS
Status: DISCONTINUED | OUTPATIENT
Start: 2020-03-08 | End: 2020-03-16 | Stop reason: HOSPADM

## 2020-03-08 RX ORDER — DEXTROSE 40 %
15 GEL (GRAM) ORAL AS NEEDED
Status: DISCONTINUED | OUTPATIENT
Start: 2020-03-08 | End: 2020-03-08

## 2020-03-08 RX ORDER — SODIUM CHLORIDE 0.9 % (FLUSH) 0.9 %
5-40 SYRINGE (ML) INJECTION AS NEEDED
Status: DISCONTINUED | OUTPATIENT
Start: 2020-03-08 | End: 2020-03-16 | Stop reason: HOSPADM

## 2020-03-08 RX ORDER — GABAPENTIN 100 MG/1
100 CAPSULE ORAL 3 TIMES DAILY
Status: DISCONTINUED | OUTPATIENT
Start: 2020-03-08 | End: 2020-03-16 | Stop reason: HOSPADM

## 2020-03-08 RX ORDER — ACETAMINOPHEN 325 MG/1
650 TABLET ORAL
Status: DISCONTINUED | OUTPATIENT
Start: 2020-03-08 | End: 2020-03-16 | Stop reason: HOSPADM

## 2020-03-08 RX ORDER — NALOXONE HYDROCHLORIDE 0.4 MG/ML
0.4 INJECTION, SOLUTION INTRAMUSCULAR; INTRAVENOUS; SUBCUTANEOUS AS NEEDED
Status: DISCONTINUED | OUTPATIENT
Start: 2020-03-08 | End: 2020-03-16 | Stop reason: HOSPADM

## 2020-03-08 RX ORDER — SODIUM CHLORIDE 0.9 % (FLUSH) 0.9 %
5-40 SYRINGE (ML) INJECTION EVERY 8 HOURS
Status: DISCONTINUED | OUTPATIENT
Start: 2020-03-08 | End: 2020-03-16 | Stop reason: HOSPADM

## 2020-03-08 RX ORDER — SODIUM CHLORIDE 450 MG/100ML
75 INJECTION, SOLUTION INTRAVENOUS CONTINUOUS
Status: DISCONTINUED | OUTPATIENT
Start: 2020-03-08 | End: 2020-03-10

## 2020-03-08 RX ORDER — DEXTROSE 50 % IN WATER (D50W) INTRAVENOUS SYRINGE
25-50 AS NEEDED
Status: DISCONTINUED | OUTPATIENT
Start: 2020-03-08 | End: 2020-03-08

## 2020-03-08 RX ORDER — DEXTROSE MONOHYDRATE AND SODIUM CHLORIDE 5; .45 G/100ML; G/100ML
125 INJECTION, SOLUTION INTRAVENOUS CONTINUOUS
Status: DISCONTINUED | OUTPATIENT
Start: 2020-03-08 | End: 2020-03-08

## 2020-03-08 RX ORDER — HYDROCODONE BITARTRATE AND ACETAMINOPHEN 5; 325 MG/1; MG/1
1 TABLET ORAL
Status: DISCONTINUED | OUTPATIENT
Start: 2020-03-08 | End: 2020-03-16 | Stop reason: HOSPADM

## 2020-03-08 RX ADMIN — SODIUM CHLORIDE 1000 ML: 900 INJECTION, SOLUTION INTRAVENOUS at 13:30

## 2020-03-08 RX ADMIN — ROSUVASTATIN CALCIUM 20 MG: 20 TABLET, COATED ORAL at 23:25

## 2020-03-08 RX ADMIN — DEXTROSE MONOHYDRATE AND SODIUM CHLORIDE 125 ML/HR: 5; .45 INJECTION, SOLUTION INTRAVENOUS at 19:44

## 2020-03-08 RX ADMIN — INSULIN GLARGINE 30 UNITS: 100 INJECTION, SOLUTION SUBCUTANEOUS at 23:01

## 2020-03-08 RX ADMIN — METOPROLOL TARTRATE 12.5 MG: 25 TABLET, FILM COATED ORAL at 21:59

## 2020-03-08 RX ADMIN — Medication 10 ML: at 22:03

## 2020-03-08 RX ADMIN — GABAPENTIN 100 MG: 100 CAPSULE ORAL at 21:59

## 2020-03-08 RX ADMIN — SODIUM CHLORIDE 125 ML/HR: 900 INJECTION, SOLUTION INTRAVENOUS at 15:11

## 2020-03-08 RX ADMIN — MEROPENEM 500 MG: 500 INJECTION, POWDER, FOR SOLUTION INTRAVENOUS at 16:42

## 2020-03-08 RX ADMIN — SODIUM CHLORIDE 8.4 UNITS/HR: 900 INJECTION, SOLUTION INTRAVENOUS at 15:33

## 2020-03-08 NOTE — ED TRIAGE NOTES
Patient arrives via EMS from home. BGL >600. Patient with confusion as well. Frequent UTIs and is supposed to be on Keflex daily, but family states she hasn't been taking this. Only treatment from EMS was about 400 cc IV fluid. EMS could not check temp because of broken thermometer. States patient was hypertensive with SBP 170s.

## 2020-03-08 NOTE — H&P
HOSPITALIST H&P/CONSULT 
NAME:  Francine Avilez Age:  78 y.o. 
:   1940 MRN:   [id number] PCP: Mayra Mark MD 
Consulting MD: Treatment Team: Primary Nurse: Brooke Loco RN 
HPI:  
Patient is an 55-year-old -American female with history of coronary artery disease, hypertension, diabetes mellitus type 2, dyslipidemia, peripheral neuropathy secondary to diabetes, CKD stage III, ESBL UTI in 2019 brought in today to the emergency with chief complaints of generalized weakness, chills, elevated blood sugar. Patient is currently accompanied by her daughter who states that patient is living with her sister who is not taking care of her mother. As per caretaker, she found the patient undressed this morning complaining of feeling cold, on checking the blood sugar, it was noted to be high. Caretaker reports, patient is not compliant with her diabetes medication. Patient reports feeling weak and lethargic but denies any headache, blurry vision, fever, chest pain, cough, shortness of breath, abdominal pain, any urinary symptoms. ER work-up showed blood glucose 545, anion gap  20, bicarbonate 15, creatinine 1.63 (at baseline), potassium 5.8, lactic acid 2.1. Urine was collected, sent for urine analysis and culture. EKG showed sinus bradycardia, chest x-ray with no acute parenchymal pathology. Complete ROS done and is as stated in HPI or otherwise negative Past Medical History:  
Diagnosis Date  Acute cystitis without hematuria 2019  CAD (coronary artery disease)  DM2 (diabetes mellitus, type 2) ([facility] [address]  Gout  HLD (hyperlipidemia)  HTN (hypertension)  Peripheral neuropathy Past Surgical History:  
Procedure Laterality Date  CARDIAC SURG PROCEDURE UNLIST    
 stents x 3   HX CHOLECYSTECTOMY jennifer in 1964  HX CORONARY ARTERY BYPASS GRAFT    
 x3  
 HX TUBAL LIGATION Prior to Admission Medications Prescriptions Last Dose Informant Patient Reported? Taking? Blood-Glucose Meter (ONETOUCH ULTRAMINI) monitoring kit   No No  
Sig: Use as directed Insulin Needles, Disposable, 31 gauge x 5/16\" ndle   No No  
Sig: by SubCUTAneous route Before breakfast, lunch, dinner and at bedtime. Lancets (ONETOUCH ULTRASOFT LANCETS) misc   No No  
Sig: Test 4 times daily as directed  
clopidogrel (PLAVIX) 75 mg tab   Yes No  
Sig: Take 75 mg by mouth.  
gabapentin (NEURONTIN) 100 mg capsule   No No  
Sig: Take 1 Cap by mouth three (3) times daily. glucose blood VI test strips (ONETOUCH ULTRA TEST) strip   No No  
Sig: Test 4 times daily as directed  
insulin glargine (LANTUS) 100 unit/mL injection   No No  
Si Units by SubCUTAneous route nightly. insulin lispro (HUMALOG) 100 unit/mL injection   No No  
Sig: 15 Units by SubCUTAneous route Before breakfast, lunch, and dinner. losartan (COZAAR) 100 mg tablet   Yes No  
Sig: Take  by mouth daily. rOPINIRole (REQUIP) 0.5 mg tablet   No No  
Sig: Take 1 Tab by mouth nightly as needed. rosuvastatin (CRESTOR) 20 mg tablet   No No  
Sig: Take 1 Tab by mouth nightly. Facility-Administered Medications: None Allergies Allergen Reactions  Sulfa (Sulfonamide Antibiotics) Swelling Social History Tobacco Use  Smoking status: Never Smoker  Smokeless tobacco: Never Used Substance Use Topics  Alcohol use: Yes Comment: socially Family History Problem Relation Age of Onset  Hypertension Mother  Cancer Mother  Diabetes Mother  Heart Disease Mother Objective:  
 
Visit Vitals /87 (BP 1 Location: Left arm, BP Patient Position: At rest) Pulse 60 Temp 95 °F (35 °C) Resp 24 Ht 5' 2\" (1.575 m) Wt 85.7 kg (189 lb) SpO2 100% BMI 34.57 kg/m² Temp (24hrs), Av °F (35 °C), Min:95 °F (35 °C), Max:95 °F (35 °C) Oxygen Therapy O2 Sat (%): 100 % (20 6419) Pulse via Oximetry: 56 beats per minute (03/08/20 1439) O2 Device: Room air (03/08/20 1316) Physical Exam: 
General:    Alert, awake, elderly, no acute distress, on room air Head:   Normocephalic, without obvious abnormality, atraumatic. Nose:  Nares normal. No drainage or sinus tenderness. Lungs:   Clear to auscultation bilaterally. No Wheezing or Rhonchi. No rales. Heart:   Regular rate and rhythm,  no murmur, rub or gallop. Abdomen:   Soft, non-tender. Not distended. Bowel sounds normal.  
Extremities: Bilateral lower extremity hyperpigmentation, no peripheral edema Skin:     Texture, turgor normal. No rashes or lesions. Not Jaundiced Neurologic: GCS 15, no motor deficits, bilateral lower extremity sensory deficits present, cranial nerves II to XII grossly intact Psych:              Alert oriented x4, mood and affect flat Data Review:  
Recent Results (from the past 24 hour(s)) EKG, 12 LEAD, INITIAL Collection Time: 03/08/20 12:45 PM  
Result Value Ref Range Ventricular Rate 57 BPM  
 Atrial Rate 57 BPM  
 P-R Interval 200 ms QRS Duration 82 ms Q-T Interval 476 ms QTC Calculation (Bezet) 463 ms Calculated P Axis 8 degrees Calculated R Axis 8 degrees Calculated T Axis -67 degrees Diagnosis Sinus bradycardia Possible Anterior infarct (cited on or before 08-MAR-2020) ST & T wave abnormality, consider inferior ischemia Abnormal ECG When compared with ECG of 08-AUG-2019 12:09, Serial changes of evolving Anterior infarct Present GLUCOSE, POC Collection Time: 03/08/20  1:37 PM  
Result Value Ref Range Glucose (POC) 544 (HH) 65 - 100 mg/dL CBC WITH AUTOMATED DIFF Collection Time: 03/08/20  1:58 PM  
Result Value Ref Range WBC 10.4 4.3 - 11.1 K/uL  
 RBC 3.97 (L) 4.05 - 5.2 M/uL  
 HGB 11.8 11.7 - 15.4 g/dL HCT 36.3 35.8 - 46.3 % MCV 91.4 79.6 - 97.8 FL  
 MCH 29.7 26.1 - 32.9 PG  
 MCHC 32.5 31.4 - 35.0 g/dL  
 RDW 13.1 11.9 - 14.6 % PLATELET 194 134 - 643 K/uL MPV 10.6 9.4 - 12.3 FL ABSOLUTE NRBC 0.00 0.0 - 0.2 K/uL NEUTROPHILS 81 (H) 43 - 78 % LYMPHOCYTES 12 (L) 13 - 44 % MONOCYTES 6 4.0 - 12.0 % EOSINOPHILS 0 (L) 0.5 - 7.8 % BASOPHILS 0 0.0 - 2.0 % IMMATURE GRANULOCYTES 1 0.0 - 5.0 %  
 ABS. NEUTROPHILS 8.5 (H) 1.7 - 8.2 K/UL  
 ABS. LYMPHOCYTES 1.2 0.5 - 4.6 K/UL  
 ABS. MONOCYTES 0.6 0.1 - 1.3 K/UL  
 ABS. EOSINOPHILS 0.0 0.0 - 0.8 K/UL  
 ABS. BASOPHILS 0.0 0.0 - 0.2 K/UL  
 ABS. IMM. GRANS. 0.1 0.0 - 0.5 K/UL  
 DF AUTOMATED METABOLIC PANEL, COMPREHENSIVE Collection Time: 03/08/20  1:58 PM  
Result Value Ref Range Sodium 135 (L) 136 - 145 mmol/L Potassium 5.8 (H) 3.5 - 5.1 mmol/L Chloride 100 98 - 107 mmol/L  
 CO2 15 (L) 21 - 32 mmol/L Anion gap 20 (H) 7 - 16 mmol/L Glucose 545 (HH) 65 - 100 mg/dL BUN 19 8 - 23 MG/DL Creatinine 1.63 (H) 0.6 - 1.0 MG/DL  
 GFR est AA 39 (L) >60 ml/min/1.73m2 GFR est non-AA 32 (L) >60 ml/min/1.73m2 Calcium 8.8 8.3 - 10.4 MG/DL Bilirubin, total 0.4 0.2 - 1.1 MG/DL  
 ALT (SGPT) 27 12 - 65 U/L  
 AST (SGOT) 25 15 - 37 U/L Alk. phosphatase 157 (H) 50 - 136 U/L Protein, total 7.8 6.3 - 8.2 g/dL Albumin 1.0 (L) 3.2 - 4.6 g/dL Globulin 6.8 (H) 2.3 - 3.5 g/dL A-G Ratio 0.1 (L) 1.2 - 3.5 LACTIC ACID Collection Time: 03/08/20  1:58 PM  
Result Value Ref Range Lactic acid 2.1 (HH) 0.4 - 2.0 MMOL/L  
TROPONIN I Collection Time: 03/08/20  1:58 PM  
Result Value Ref Range Troponin-I, Qt. <0.02 (L) 0.02 - 0.05 NG/ML  
GLUCOSE, POC Collection Time: 03/08/20  3:21 PM  
Result Value Ref Range Glucose (POC) 479 (HH) 65 - 100 mg/dL Imaging Cristofer Gutierrez All diagnostic imaging personally reviewed by me Chest x-ray is unremarkable for any acute pathology. Assessment and Plan: Active Hospital Problems Diagnosis Date Noted  Dehydration 03/08/2020  Diabetic ketoacidosis ([facility] [address] 03/08/2020  Hypothermia 03/08/2020  Metabolic acidosis 33/72/4437  Noncompliance 03/08/2020  Hypoalbuminemia due to protein-calorie malnutrition ([facility] 03/08/2020  Generalized weakness 09/16/2019  Chronic kidney disease, stage III (moderate) ([facility] 06/15/2015  Coronary artery disease involving coronary bypass graft of native heart without angina pectoris 06/15/2015  Gastroesophageal reflux disease without esophagitis 06/15/2015  HLD (hyperlipidemia) 06/15/2015  Essential hypertension 06/15/2015  RLS (restless legs syndrome) 06/15/2015  Peripheral neuropathy 06/15/2015  Insomnia 06/15/2015  Uncontrolled type 2 diabetes mellitus with hyperglycemia ([facility] 06/15/2015 PLAN 
· Admit to ICU for DKA and possible sepsis · Start on glucose stabilizer with DKA protocol, with blood glucose goal of 150-250. Once blood sugar is at goal, switch IV fluids from normal saline to D5 half NS and continue with insulin drip until anion gap is closed. Continue monitor BMP every 4 hours as per the protocol. · N.p.o. except meds and sips of clear water · Follow-up with urinalysis and urine/blood culture · Given previous history of ESBL E. coli and enterococcus UTI we will start the patient on IV Merrem. · Replace potassium and magnesium as per ICU protocol · CKD stage III is stable creatinine is 1.63 which is at baseline. No intervention right now · Continue IV hydration · Severe hypoalbuminemia secondary to poor calorie intake. Will consult nutritionist and add dietary supplement when patient is allowed to eat. · Resume losartan and Lopressor for hypertension · Check A1c 
· PT/OT eval 
· PPD ordered · DVT prophylaxis with heparin · Continue all other home medication as reconciled in [facility] Code Status: Full High risk Anticipated discharge: More than 2 midnights Signed By: Everlean Nyhan, MD   
 March 8, 2020

## 2020-03-08 NOTE — ED NOTES
Lucy  in place at this time. Pt placed in gown. Pt resting on stretcher at this time. Pt family stepped out for procedure and have not stepped back in at this time.

## 2020-03-08 NOTE — ROUTINE PROCESS
TRANSFER - OUT REPORT: 
 
Verbal report given to Danielle Roach RN  on Jose Mann  being transferred to [address] [place] [address] for routine progression of care Report consisted of patients Situation, Background, Assessment and  
Recommendations(SBAR). Information from the following report(s) SBAR, ED Summary and MAR was reviewed with the receiving nurse. Lines:  
Peripheral IV 03/08/20 Right Antecubital (Active) Peripheral IV 03/08/20 Left Antecubital (Active) Opportunity for questions and clarification was provided. Patient transported with: 
 Monitor Registered Nurse

## 2020-03-08 NOTE — ED NOTES
Pt continues to rest on stretcher with shauna huggar in place, coyle in place, insulin gtt initiated, IVF infusing. Pt eyes closed. Lights dimmed for comfort.

## 2020-03-08 NOTE — PROGRESS NOTES
TRANSFER - IN REPORT: 
 
Verbal report received from [place] RN(name) on Marcia Arnold  being received from ED(unit) for routine progression of care Report consisted of patients Situation, Background, Assessment and  
Recommendations(SBAR). Information from the following report(s) SBAR, ED Summary, Recent Results and Cardiac Rhythm SR-SB was reviewed with the receiving nurse. Opportunity for questions and clarification was provided. Assessment completed upon patients arrival to unit and care assumed. Dual skin assessment performed with Alek Suarez RN. No noted breakdown or pressure related wounds. Scars to abdomen and torso. Dry skin on extremities. Pt on insulin gtt on arrival and NS at 125. Pt alert and oriented. No noted distress. VSS.

## 2020-03-08 NOTE — ED PROVIDER NOTES
Patient is an 47-year-old female presenting the emerge department today from home secondary to increasing altered mental status which started on Thursday and is progressively worsened. Patient is accompanied to the emergency department by her home care aide who states that she gives her her 15 units of Lantus in the mornings and then the patient is supposed to give herself another 15 units at nighttime but often times she will come in the next day and the medicine has not been administered. The patient has chronic urinary tract infections and according to EMS has antibiotics that she is supposed to take daily but has not been taking the medication. The aide says that they have not noticed any fevers however she was not with her over the several days as her daughter was taking care of her. Past Medical History:  
Diagnosis Date  Acute cystitis without hematuria 1/30/2019  CAD (coronary artery disease)  DM2 [facility] mellitus[facility] type 2) [facility]  Gout  HLD (hyperlipidemia)  HTN (hypertension)  Peripheral neuropathy Past Surgical History:  
Procedure Laterality Date  CARDIAC SURG PROCEDURE UNLIST    
 stents x 3 2009  HX CHOLECYSTECTOMY jennifer in 1964  HX CORONARY ARTERY BYPASS GRAFT    
 x3  
 HX TUBAL LIGATION Family History:  
Problem Relation Age of Onset  Hypertension Mother  Cancer Mother  Diabetes Mother  Heart Disease Mother Social History Socioeconomic History  Marital status: SINGLE Spouse name: Not on file  Number of children: Not on file  Years of education: Not on file  Highest education level: Not on file Occupational History  Occupation: retired  Social Needs  Financial resource strain: Not on file  Food insecurity:  
  Worry: Not on file Inability: Not on file  Transportation needs:  
  Medical: Not on file Non-medical: Not on file Tobacco Use  
  Smoking status: Never Smoker  Smokeless tobacco: Never Used Substance and Sexual Activity  Alcohol use: Yes Comment: socially  Drug use: No  
 Sexual activity: Not on file Lifestyle  Physical activity:  
  Days per week: Not on file Minutes per session: Not on file  Stress: Not on file Relationships  Social connections:  
  Talks on phone: Not on file Gets together: Not on file Attends Hoahaoism service: Not on file Active member of club or organization: Not on file Attends meetings of clubs or organizations: Not on file Relationship status: Not on file  Intimate partner violence:  
  Fear of current or ex partner: Not on file Emotionally abused: Not on file Physically abused: Not on file Forced sexual activity: Not on file Other Topics Concern  Not on file Social History Narrative Lives alone but has a caregiver who helps several hours per day, several days per week ALLERGIES: Sulfa (sulfonamide antibiotics) Review of Systems Psychiatric/Behavioral: Positive for confusion. All other systems reviewed and are negative. Vitals:  
 03/08/20 1316 03/08/20 1328 03/08/20 1438 03/08/20 1439 BP: 181/87 Pulse: 60 Resp: 24 Temp:  95 °F (35 °C) SpO2: 91%  98% 100% Physical Exam  
 
GENERAL:The patient is overweight, and dehydrated. VITAL SIGNS: Heart rate, blood pressure, respiratory rate reviewed as recorded in 
nurse's notes EYES: Pupils reactive. Extraocular motion intact. No conjunctival redness or drainage. EARS: No external masses or lesions. NOSE: No nasal drainage or epistaxis. MOUTH/THROAT: Pharynx clear; airway patent. Dry mucous membranes NECK: Supple, no meningeal signs. Trachea midline. No masses or thyromegaly. LUNGS: Breath sounds clear and equal bilaterally no accessory muscle use CARDIOVASCULAR: Regular rate and rhythm ABDOMEN: Soft without tenderness. No palpable masses or organomegaly. No 
peritoneal signs. No rigidity. EXTREMITIES: No clubbing or cyanosis. No joint swelling. Normal muscle tone. No 
restricted range of motion appreciated. NEUROLOGIC: Sensation is grossly intact. Cranial nerve exam reveals face is 
symmetrical, tongue is midline speech is clear. SKIN: No rash or petechiae. Decreased skin turgor palpated. PSYCHIATRIC: Alert. Patient is oriented to person and can identify her caregiver but does not know where she is or what year it is which according to the aide at bedside is unusual for her. MDM Number of Diagnoses or Management Options Diagnosis management comments: Medication noncompliance, diabetic diet noncompliance, Hyperglycemia, diabetic ketoacidosis, diabetic coma, Electrolyte abnormality, dehydration, Infection, UTI, Amount and/or Complexity of Data Reviewed Clinical lab tests: ordered and reviewed Tests in the radiology section of CPT®: ordered and reviewed Tests in the medicine section of CPT®: ordered and reviewed Obtain history from someone other than the patient: yes Review and summarize past medical records: yes Independent visualization of images, tracings, or specimens: yes ED Course as of Mar 08 1502 Gagandeep Ayonjan Mar 08, 2020  
[id number] Case was discussed with the patient's family and they are agreeable with her being admitted to the hospital.  [facility] and they will come and see her here in the emergency department. [facility] ED Course User Index [KH] Leeland Hammans, DO  
 
 
CRITICAL CARE (ASAP ONLY) Performed by: Leeland Hammans, DO Authorized by: Leeland Hammans, DO Comments:  
   Critical care time: 106 minutes of critical care time was performed in the emergency department. This was separate from any other procedures listed during the patients emergency department course.  The failure to initiate these interventions on an urgent basis would likely have resulted in sudden, clinically significant or life-threatening deterioration in the patients condition.

## 2020-03-09 LAB
ANION GAP SERPL CALC-SCNC: 5 MMOL/L (ref 7–16)
BUN SERPL-MCNC: 22 MG/DL (ref 8–23)
CALCIUM SERPL-MCNC: 8.2 MG/DL (ref 8.3–10.4)
CHLORIDE SERPL-SCNC: 109 MMOL/L (ref 98–107)
CO2 SERPL-SCNC: 28 MMOL/L (ref 21–32)
CREAT SERPL-MCNC: 1.35 MG/DL (ref 0.6–1)
ERYTHROCYTE [DISTWIDTH] IN BLOOD BY AUTOMATED COUNT: 12.8 % (ref 11.9–14.6)
GLUCOSE BLD STRIP.AUTO-MCNC: 121 MG/DL (ref 65–100)
GLUCOSE BLD STRIP.AUTO-MCNC: 237 MG/DL (ref 65–100)
GLUCOSE BLD STRIP.AUTO-MCNC: 287 MG/DL (ref 65–100)
GLUCOSE BLD STRIP.AUTO-MCNC: 391 MG/DL (ref 65–100)
GLUCOSE SERPL-MCNC: 157 MG/DL (ref 65–100)
HCT VFR BLD AUTO: 33.7 % (ref 35.8–46.3)
HGB BLD-MCNC: 10.8 G/DL (ref 11.7–15.4)
MCH RBC QN AUTO: 29.8 PG (ref 26.1–32.9)
MCHC RBC AUTO-ENTMCNC: 32 G/DL (ref 31.4–35)
MCV RBC AUTO: 92.8 FL (ref 79.6–97.8)
MM INDURATION POC: NORMAL (ref 0–5)
NRBC # BLD: 0 K/UL (ref 0–0.2)
PLATELET # BLD AUTO: 250 K/UL (ref 150–450)
PMV BLD AUTO: 10.1 FL (ref 9.4–12.3)
POTASSIUM SERPL-SCNC: 3.9 MMOL/L (ref 3.5–5.1)
PPD POC: NORMAL
RBC # BLD AUTO: 3.63 M/UL (ref 4.05–5.2)
SODIUM SERPL-SCNC: 142 MMOL/L (ref 136–145)
WBC # BLD AUTO: 9.6 K/UL (ref 4.3–11.1)

## 2020-03-09 PROCEDURE — 82962 GLUCOSE BLOOD TEST: CPT

## 2020-03-09 PROCEDURE — 74011250637 HC RX REV CODE- 250/637: Performed by: HOSPITALIST

## 2020-03-09 PROCEDURE — 74011250636 HC RX REV CODE- 250/636: Performed by: INTERNAL MEDICINE

## 2020-03-09 PROCEDURE — 97535 SELF CARE MNGMENT TRAINING: CPT

## 2020-03-09 PROCEDURE — 74011250637 HC RX REV CODE- 250/637: Performed by: INTERNAL MEDICINE

## 2020-03-09 PROCEDURE — 74011636637 HC RX REV CODE- 636/637: Performed by: INTERNAL MEDICINE

## 2020-03-09 PROCEDURE — 36415 COLL VENOUS BLD VENIPUNCTURE: CPT

## 2020-03-09 PROCEDURE — 74011000302 HC RX REV CODE- 302: Performed by: HOSPITALIST

## 2020-03-09 PROCEDURE — 80048 BASIC METABOLIC PNL TOTAL CA: CPT

## 2020-03-09 PROCEDURE — 97161 PT EVAL LOW COMPLEX 20 MIN: CPT

## 2020-03-09 PROCEDURE — 86580 TB INTRADERMAL TEST: CPT | Performed by: HOSPITALIST

## 2020-03-09 PROCEDURE — 74011000258 HC RX REV CODE- 258: Performed by: INTERNAL MEDICINE

## 2020-03-09 PROCEDURE — 74011250636 HC RX REV CODE- 250/636: Performed by: HOSPITALIST

## 2020-03-09 PROCEDURE — P9047 ALBUMIN (HUMAN), 25%, 50ML: HCPCS | Performed by: INTERNAL MEDICINE

## 2020-03-09 PROCEDURE — 85027 COMPLETE CBC AUTOMATED: CPT

## 2020-03-09 PROCEDURE — 74011000258 HC RX REV CODE- 258: Performed by: HOSPITALIST

## 2020-03-09 PROCEDURE — 65660000000 HC RM CCU STEPDOWN

## 2020-03-09 PROCEDURE — 97116 GAIT TRAINING THERAPY: CPT

## 2020-03-09 PROCEDURE — 97165 OT EVAL LOW COMPLEX 30 MIN: CPT

## 2020-03-09 RX ORDER — ALBUMIN HUMAN 250 G/1000ML
25 SOLUTION INTRAVENOUS
Status: COMPLETED | OUTPATIENT
Start: 2020-03-09 | End: 2020-03-09

## 2020-03-09 RX ORDER — INSULIN GLARGINE 100 [IU]/ML
40 INJECTION, SOLUTION SUBCUTANEOUS
Status: DISCONTINUED | OUTPATIENT
Start: 2020-03-09 | End: 2020-03-10

## 2020-03-09 RX ORDER — ATORVASTATIN CALCIUM 10 MG/1
10 TABLET, FILM COATED ORAL DAILY
COMMUNITY
End: 2020-03-16

## 2020-03-09 RX ORDER — INSULIN LISPRO 100 [IU]/ML
5 INJECTION, SOLUTION INTRAVENOUS; SUBCUTANEOUS ONCE
Status: COMPLETED | OUTPATIENT
Start: 2020-03-09 | End: 2020-03-09

## 2020-03-09 RX ORDER — BENZONATATE 100 MG/1
100 CAPSULE ORAL
Status: DISCONTINUED | OUTPATIENT
Start: 2020-03-09 | End: 2020-03-16 | Stop reason: HOSPADM

## 2020-03-09 RX ADMIN — MEROPENEM 500 MG: 500 INJECTION, POWDER, FOR SOLUTION INTRAVENOUS at 00:59

## 2020-03-09 RX ADMIN — SODIUM CHLORIDE 75 ML/HR: 450 INJECTION, SOLUTION INTRAVENOUS at 10:03

## 2020-03-09 RX ADMIN — Medication 10 ML: at 22:48

## 2020-03-09 RX ADMIN — ROSUVASTATIN CALCIUM 20 MG: 20 TABLET, COATED ORAL at 22:46

## 2020-03-09 RX ADMIN — TUBERCULIN PURIFIED PROTEIN DERIVATIVE 5 UNITS: 5 INJECTION INTRADERMAL at 00:56

## 2020-03-09 RX ADMIN — SODIUM CHLORIDE 125 ML/HR: 450 INJECTION, SOLUTION INTRAVENOUS at 00:19

## 2020-03-09 RX ADMIN — Medication 10 ML: at 06:01

## 2020-03-09 RX ADMIN — Medication 10 ML: at 13:26

## 2020-03-09 RX ADMIN — HEPARIN SODIUM 5000 UNITS: 5000 INJECTION INTRAVENOUS; SUBCUTANEOUS at 17:02

## 2020-03-09 RX ADMIN — HEPARIN SODIUM 5000 UNITS: 5000 INJECTION INTRAVENOUS; SUBCUTANEOUS at 06:00

## 2020-03-09 RX ADMIN — LOSARTAN POTASSIUM 100 MG: 50 TABLET, FILM COATED ORAL at 08:33

## 2020-03-09 RX ADMIN — MEROPENEM 500 MG: 500 INJECTION, POWDER, FOR SOLUTION INTRAVENOUS at 08:33

## 2020-03-09 RX ADMIN — INSULIN LISPRO 6 UNITS: 100 INJECTION, SOLUTION INTRAVENOUS; SUBCUTANEOUS at 22:04

## 2020-03-09 RX ADMIN — GABAPENTIN 100 MG: 100 CAPSULE ORAL at 22:47

## 2020-03-09 RX ADMIN — SODIUM CHLORIDE 250 ML: 900 INJECTION, SOLUTION INTRAVENOUS at 04:10

## 2020-03-09 RX ADMIN — GABAPENTIN 100 MG: 100 CAPSULE ORAL at 17:03

## 2020-03-09 RX ADMIN — CLOPIDOGREL BISULFATE 75 MG: 75 TABLET ORAL at 08:32

## 2020-03-09 RX ADMIN — GABAPENTIN 100 MG: 100 CAPSULE ORAL at 08:46

## 2020-03-09 RX ADMIN — SENNOSIDES AND DOCUSATE SODIUM 1 TABLET: 8.6; 5 TABLET ORAL at 08:32

## 2020-03-09 RX ADMIN — BENZONATATE 100 MG: 100 CAPSULE ORAL at 22:45

## 2020-03-09 RX ADMIN — Medication 1 EACH: at 14:28

## 2020-03-09 RX ADMIN — ALBUMIN (HUMAN) 25 G: 0.25 INJECTION, SOLUTION INTRAVENOUS at 03:55

## 2020-03-09 RX ADMIN — INSULIN LISPRO 10 UNITS: 100 INJECTION, SOLUTION INTRAVENOUS; SUBCUTANEOUS at 17:03

## 2020-03-09 RX ADMIN — INSULIN GLARGINE 40 UNITS: 100 INJECTION, SOLUTION SUBCUTANEOUS at 22:04

## 2020-03-09 RX ADMIN — Medication 1 AMPULE: at 22:03

## 2020-03-09 RX ADMIN — INSULIN LISPRO 4 UNITS: 100 INJECTION, SOLUTION INTRAVENOUS; SUBCUTANEOUS at 12:08

## 2020-03-09 RX ADMIN — INSULIN LISPRO 5 UNITS: 100 INJECTION, SOLUTION INTRAVENOUS; SUBCUTANEOUS at 17:03

## 2020-03-09 NOTE — PROGRESS NOTES
Problem: Self Care Deficits Care Plan (Adult) Goal: *Acute Goals and Plan of Care (Insert Text) Description 1. Patient will complete lower body bathing and dressing with supervision and adaptive equipment as needed. 2. Patient will complete toileting with supervision. 3. Patient will tolerate 30 minutes of OT treatment with 2-3 rest breaks to increase activity tolerance for ADLs. 4. Patient will complete functional transfers with supervision and adaptive equipment as needed. 5. Patient will complete functional mobility for household distances with supervision and good safety. Timeframe: 7 visits Outcome: Progressing Towards Goal 
  
OCCUPATIONAL THERAPY: Initial Assessment, Daily Note, and PM 3/9/2020 INPATIENT: OT Visit Days: 1 Payor: [address] [place],9D / Plan: [address] / Product Type: Managed Care Medicare /  
  
NAME/AGE/GENDER: Roxy Damon is a 78 y.o. female PRIMARY DIAGNOSIS:  Diabetic ketoacidosis ([facility] [address] [E11.10] Metabolic acidosis [N37.6] Dehydration [E86.0] Noncompliance [Z91.19] Hypothermia [T68. XXXA] Diabetic ketoacidosis ([address] Diabetic ketoacidosis ([facility] [address] ICD-10: Treatment Diagnosis:  
 Generalized Muscle Weakness (M62.81) Other lack of cordination (R27.8) Precautions/Allergies: 
   Sulfa (sulfonamide antibiotics) ASSESSMENT:  
 
Ms. Hailey Romero presents to the hospital with diabetic ketoacidosis, dehydration, non-compliance, and hypothermia. Pt recently transferred out of the ICU. Pt is sitting up in the chair upon arrival. Pt is alert and pleasant. Pt denies any current pain. Pt completed functional transfers and functional mobility during today's session with minimal assistance. Pt relied on constant support from walker, sink, walls while standing.  Pt participated in ADL tasks to include standing sink side with CGA to brush teeth, toileting, donning a new brief, and standing to wash hands at the sink. Pt required additional time with activity and had some limited use with her R hand due to arthritis. Pt needed additional assistance to open packages, reach to her bottom for hygiene, and with threading brief around her ankles. Pt tolerated session well and was set-up in her recliner chair with all needs at her side. Pt states she feels she could benefit from a rehab stay and plans to return to her daughter's house afterwards. Pt is currently functioning below baseline and will benefit from OT services to address stated goals and plan of care. This section established at most recent assessment PROBLEM LIST (Impairments causing functional limitations): 
Decreased Strength Decreased ADL/Functional Activities Decreased Transfer Abilities Decreased Ambulation Ability/Technique Decreased Balance Decreased Activity Tolerance Increased Fatigue Decreased Flexibility/Joint Mobility Decreased Hall with Home Exercise Program 
 INTERVENTIONS PLANNED: (Benefits and precautions of occupational therapy have been discussed with the patient.) Activities of daily living training Adaptive equipment training Balance training Clothing management Donning&doffing training Neuromuscular re-eduation Therapeutic activity Therapeutic exercise TREATMENT PLAN: Frequency/Duration: Follow patient 3 times per week to address above goals. Rehabilitation Potential For Stated Goals: Excellent REHAB RECOMMENDATIONS (at time of discharge pending progress):   
Placement: It is my opinion, based on this patient's performance to date, that Ms. Haily Galeana may benefit from intensive therapy at a [address] [place] [address] after discharge due to the functional deficits listed above that are likely to improve with skilled rehabilitation and concerns that he/she may be unsafe to be unsupervised at home due to risk for falls and further functional decline. Equipment: TBD OCCUPATIONAL PROFILE AND HISTORY:  
 History of Present Injury/Illness (Reason for Referral): 
See H&P Past Medical History/Comorbidities: Ms. Haily Galeana  has a past medical history of Acute cystitis without hematuria (1/30/2019), CAD (coronary artery disease), DM2 (diabetes mellitus, type 2) ([facility] [address] Gout, HLD (hyperlipidemia), HTN (hypertension), and Peripheral neuropathy. Ms. Haily Galeana  has a past surgical history that includes hx tubal ligation; hx coronary artery bypass graft; pr cardiac surg procedure unlist; and hx cholecystectomy. Social History/Living Environment:  
Home Environment: Apartment([facility]) # Steps to Enter: 4 Rails to Enter: Yes Living Alone: Yes Support Systems: Child(marga) Patient Expects to be Discharged to[de-identified] Rehabilitation facility Current DME Used/Available at Home: Henri Leventhal, straight, Shower chair, Walker, rollator Tub or Shower Type: Tub/Shower combination Prior Level of Function/Work/Activity: 
Pt lives alone and is typically modified independent with ADL and using a rollator for functional mobility. Pt reports no recent falls. Pt has an aide for household chores. Does some occasional cooking. Personal Factors:   
      Social Background:  Lives alone Other factors that influence how disability is experienced by the patient:  multiple co-morbidities Number of Personal Factors/Comorbidities that affect the Plan of Care: Expanded review of therapy/medical records (1-2):  MODERATE COMPLEXITY ASSESSMENT OF OCCUPATIONAL PERFORMANCE[de-identified]  
Activities of Daily Living:  
Basic ADLs (From Assessment) Complex ADLs (From Assessment) Feeding: Setup Oral Facial Hygiene/Grooming: Contact guard assistance Bathing: Moderate assistance Upper Body Dressing: Minimum assistance Lower Body Dressing: Moderate assistance Toileting: Moderate assistance Instrumental ADL Meal Preparation: Maximum assistance Homemaking: Total assistance Grooming/Bathing/Dressing Activities of Daily Living Grooming Washing Face: Contact guard assistance Brushing Teeth: Contact guard assistance Cognitive Retraining Safety/Judgement: Awareness of environment; Fall prevention Toileting Toileting Assistance: Moderate assistance Bowel Hygiene: Moderate assistance Clothing Management: Moderate assistance Cues: Physical assistance for pants up; Tactile cues provided;Verbal cues provided;Visual cues provided Functional Transfers Bathroom Mobility: Minimum assistance Toilet Transfer : Minimum assistance Bed/Mat Mobility Supine to Sit: Stand-by assistance Sit to Stand: Minimum assistance Stand to Sit: Contact guard assistance Bed to Chair: Minimum assistance Scooting: Contact guard assistance Most Recent Physical Functioning:  
Gross Assessment: 
AROM: Generally decreased, functional(arthritis in R hand) Strength: Generally decreased, functional 
         
  
Posture: 
  
Balance: 
Sitting: Intact Standing: Impaired Standing - Static: Fair Standing - Dynamic : Fair Bed Mobility: 
Supine to Sit: Stand-by assistance Scooting: Contact guard assistance Wheelchair Mobility: 
  
Transfers: 
Sit to Stand: Minimum assistance Stand to Sit: Contact guard assistance Bed to Chair: Minimum assistance Patient Vitals for the past 6 hrs: 
 BP BP Patient Position SpO2 Pulse 03/09/20 1022 129/67 Sitting 94 % 77 Mental Status Neurologic State: Alert Orientation Level: Oriented X4 Cognition: Follows commands Perception: Appears intact Perseveration: No perseveration noted Safety/Judgement: Awareness of environment, Fall prevention Physical Skills Involved: 
Range of Motion Balance Strength Activity Tolerance Vision Cognitive Skills Affected (resulting in the inability to perform in a timely and safe manner): 
None  Psychosocial Skills Affected: 
Habits/Routines Number of elements that affect the Plan of Care: 5+:  HIGH COMPLEXITY CLINICAL DECISION MAKING:  
 [facility] Daily Activity Inpatient Short Form How much help from another person does the patient currently need. .. Total A Lot A Little None 1. Putting on and taking off regular lower body clothing? [] 1   [x] 2   [] 3   [] 4  
2. Bathing (including washing, rinsing, drying)? [] 1   [x] 2   [] 3   [] 4  
3. Toileting, which includes using toilet, bedpan or urinal?   [] 1   [x] 2   [] 3   [] 4  
4. Putting on and taking off regular upper body clothing? [] 1   [] 2   [x] 3   [] 4  
5. Taking care of personal grooming such as brushing teeth? [] 1   [] 2   [x] 3   [] 4  
6. Eating meals? [] 1   [] 2   [x] 3   [] 4  
© 2007, Trustees of [address] [place] [address], under license to [employer]. All rights reserved Score:  Initial: 15 Most Recent: X (Date: -- ) Interpretation of Tool:  Represents activities that are increasingly more difficult (i.e. Bed mobility, Transfers, Gait). Medical Necessity:    
Patient demonstrates  
good and excellent 
 rehab potential due to higher previous functional level. Reason for Services/Other Comments: 
Patient continues to require skilled intervention due to Decreased independence with ADL/functional mobility Michn Shobha Use of outcome tool(s) and clinical judgement create a POC that gives a: LOW COMPLEXITY  
 
 
 
TREATMENT:  
(In addition to Assessment/Re-Assessment sessions the following treatments were rendered) Pre-treatment Symptoms/Complaints:   
Pain: Initial:  
Pain Intensity 1: 0  Post Session:  0/10 Self Care: (38 minutes): Procedure(s) (per grid) utilized to improve and/or restore self-care/home management as related to dressing, toileting, and grooming. Required minimal to moderate visual, verbal, manual, and tactile cueing to facilitate activities of daily living skills and compensatory activities. Braces/Orthotics/Lines/Etc:  
IV 
coyle catheter O2 Device: Room air Treatment/Session Assessment: Response to Treatment:  Pt tolerated it well with no major complaints. Interdisciplinary Collaboration: Occupational Therapist 
Registered Nurse After treatment position/precautions:  
Up in chair Bed/Chair-wheels locked Call light within reach RN notified Compliance with Program/Exercises: Will assess as treatment progresses. Recommendations/Intent for next treatment session: \"Next visit will focus on advancements to more challenging activities and reduction in assistance provided\". Total Treatment Duration: OT Patient Time In/Time Out Time In: 1494 Time Out: 1455 Keyana James OT

## 2020-03-09 NOTE — CONSULTS
LEAPFROG PROTOCOL NOTE Marcia Josephomero 3/9/2020 The patient is currently in the critical care setting managed by Dr. Hermila Sosa with DKA, bradycardia, and urosepsis. The patient's chart is reviewed and the patient is discussed with the staff. Patient is currently hemodynamically stable. Patient has no needs identified for Intensivist management in the critical care setting at this time. Please notify us if can be of assistance. No charge billed to the patient. Thank you. SLIME Gonzalez Agree.  
 
David Bauer MD

## 2020-03-09 NOTE — PROGRESS NOTES
Notified Dr. Barr Most of patient's marginal urine output with 50mLs in the last 4 hours. Orders to be placed.

## 2020-03-09 NOTE — PROGRESS NOTES
Hourly rounds performed, family at bedside. No complaints per pt. Afternoon , DR notified. Additional units given per order. Will continue to monitor.

## 2020-03-09 NOTE — PROGRESS NOTES
Interdisciplinary team rounds were held 3/9/2020 with the following team members:Care Management, Nursing, Nurse Practitioner, Nutrition, Palliative Care, Pastoral Care, Pharmacy, Physical Therapy, Physician, Respiratory Therapy and Clinical Coordinator. Plan of care discussed. See clinical pathway and/or care plan for interventions and desired outcomes.

## 2020-03-09 NOTE — PROGRESS NOTES
Critical Care Outreach Nurse Progress Report: 
 
Subjective: In to assess pt secondary to recent transfer from ICU MEWS Score: 1 (03/09/20 1022) Vitals:  
 03/09/20 5957 03/09/20 0831 03/09/20 0908 03/09/20 1022 BP: 149/76 165/83 160/80 129/67 Pulse: (!) 57 69 62 77 Resp: 20 21 19 18 Temp:    97.7 °F (36.5 °C) SpO2: 96% 100% 94% 94% Weight:      
Height:      
  
 
 
Assessment: Pt awake, alert, oriented x 4, no acute concerns noted at this time. Lung sounds clear, on RA o2 sats 95% HR 71. Plan: Will follow per ICU outreach protocol.

## 2020-03-09 NOTE — PROGRESS NOTES
Hospitalist Note Admit Date:  3/8/2020  1:12 PM  
Name:  Corie Jo Age:  78 y.o. 
:  1940 MRN:  [id number] PCP:  Gabino Ponce MD 
Treatment Team: Attending Provider: Mina De La Garza MD; Primary Nurse: Jeannie Gillis, FAY; Utilization Review: Brennen Urena RN; Occupational Therapist: Brionna Walls OT; Care Manager: Sarah Wade, RN; Physical Therapist: Tory Benitez, PT, DPT 
 
HPI/Subjective: Ms. Zavaleta is an 77 y/o AAF with a h/o CAD, HTN, DM2, HLD, CKD, ESBL UTI 2019 who was admitted on 3/8 with DKA. 
 
3/9: A/O x3. Sugars improved and AG normalized yesterday so basal insulin was given last night and insulin drip then stopped. AM fingerstick 121 this morning. No N/V, abdo pain or urinary symptoms. Ready to eat breakfast. Some O2 sats noted <90% but CXR normal and remains on RA O2 w/o respiratory complaints. No other complaints Objective:  
 
Patient Vitals for the past 24 hrs: 
 Temp Pulse Resp BP SpO2  
20 0631  65 21 140/73 (!) 89 % 20 0602  (!) 53 19 126/64 91 % 20 0531  (!) 56 19 125/61 (!) 88 % 20 0502  74 20 127/65 (!) 85 % 20 0432  68 23 160/79 93 % 20 0402  (!) 56 20 134/68 93 % 20 0331  65 20 157/79 94 % 20 0302 97.8 °F (36.6 °C) (!) 57 19 99/55 92 % 20 0231  68 23 135/70 90 % 20 0202  69 17 118/65 91 % 20 0131  73 23 113/60 94 % 20 0102  62 13 131/70 93 % 20 0031  61 19 115/59 93 % 20 0001  66 19 137/73 97 % 20 2331  66 18 140/83 96 % 20 2301 98.4 °F (36.9 °C) 66 17 147/74 94 % 20 2231  64 22 132/70 93 % 20 2201  65 17 155/78 95 % 20 2159  67  148/42   
20 2131  64 19 148/72 93 % 20 2101  62 18 154/74 93 % 20 2032  67 19 135/74 93 % 20  67 18 144/77 94 % 20 1946  69 22 153/85 95 % 20 [address]  97 % 03/08/20 1932  71 26 153/80 94 % 03/08/20 1917  66 19 113/63 94 % 03/08/20 1901 97.7 °F (36.5 °C) 72 21 133/72 94 % 03/08/20 1741 98.6 °F (37 °C)      
03/08/20 1732    151/77   
03/08/20 1722     99 % 03/08/20 1721     94 % 03/08/20 1717     96 % 03/08/20 1713     96 % 03/08/20 1702    137/71 95 % 03/08/20 1632    141/72 95 % 03/08/20 1603     94 % 03/08/20 1603     94 % 03/08/20 1602 96.2 °F (35.7 °C)      
03/08/20 1602    175/88 (!) 89 % 03/08/20 1439     100 % 03/08/20 1438     98 % 03/08/20 1328 95 °F (35 °C)      
03/08/20 1316  60 24 181/87 91 % Oxygen Therapy O2 Sat (%): (!) 89 % (03/09/20 0631) Pulse via Oximetry: 64 beats per minute (03/09/20 0631) O2 Device: Room air (03/09/20 0302) Estimated body mass index is 29.76 kg/m² as calculated from the following: 
  Height as of this encounter: 5' 2\" (1.575 m). Weight as of this encounter: 73.8 kg (162 lb 11.2 oz). Intake/Output Summary (Last 24 hours) at 3/9/2020 8112 Last data filed at 3/9/2020 0889 Gross per 24 hour Intake 3266.99 ml Output 1100 ml Net 2166.99 ml  
   
*Note that automatically entered I/Os may not be accurate; dependent on patient compliance with collection and accurate  by [facility]. General:    Well nourished. Alert. CV:   RRR. No murmur, rub, or gallop. Lungs:   CTAB. No wheezing, rhonchi, or rales. Abdomen:   Soft, nontender, nondistended. Extremities: Warm and dry. No cyanosis or edema. Skin:     No rashes or jaundice. Neuro:  No gross focal deficits Data Review: 
I have reviewed all labs, meds, and studies from the last 24 hours: 
 
Recent Results (from the past 24 hour(s)) EKG, 12 LEAD, INITIAL Collection Time: 03/08/20 12:45 PM  
Result Value Ref Range Ventricular Rate 57 BPM  
 Atrial Rate 57 BPM  
 P-R Interval 200 ms QRS Duration 82 ms  Q-T Interval 476 ms  
 QTC Calculation (Bezet) 463 ms Calculated P Axis 8 degrees Calculated R Axis 8 degrees Calculated T Axis -67 degrees Diagnosis Sinus bradycardia Possible Anterior infarct (cited on or before 08-MAR-2020) ST & T wave abnormality, consider inferior ischemia Abnormal ECG When compared with ECG of 08-AUG-2019 12:09, Serial changes of evolving Anterior infarct Present Confirmed by Ivonne Buerger ([id number]) on 3/8/2020 8:12:50 PM 
  
GLUCOSE, POC Collection Time: 03/08/20  1:37 PM  
Result Value Ref Range Glucose (POC) 544 (HH) 65 - 100 mg/dL CBC WITH AUTOMATED DIFF Collection Time: 03/08/20  1:58 PM  
Result Value Ref Range WBC 10.4 4.3 - 11.1 K/uL  
 RBC 3.97 (L) 4.05 - 5.2 M/uL  
 HGB 11.8 11.7 - 15.4 g/dL HCT 36.3 35.8 - 46.3 % MCV 91.4 79.6 - 97.8 FL  
 MCH 29.7 26.1 - 32.9 PG  
 MCHC 32.5 31.4 - 35.0 g/dL  
 RDW 13.1 11.9 - 14.6 % PLATELET 601 361 - 149 K/uL MPV 10.6 9.4 - 12.3 FL ABSOLUTE NRBC 0.00 0.0 - 0.2 K/uL NEUTROPHILS 81 (H) 43 - 78 % LYMPHOCYTES 12 (L) 13 - 44 % MONOCYTES 6 4.0 - 12.0 % EOSINOPHILS 0 (L) 0.5 - 7.8 % BASOPHILS 0 0.0 - 2.0 % IMMATURE GRANULOCYTES 1 0.0 - 5.0 %  
 ABS. NEUTROPHILS 8.5 (H) 1.7 - 8.2 K/UL  
 ABS. LYMPHOCYTES 1.2 0.5 - 4.6 K/UL  
 ABS. MONOCYTES 0.6 0.1 - 1.3 K/UL  
 ABS. EOSINOPHILS 0.0 0.0 - 0.8 K/UL  
 ABS. BASOPHILS 0.0 0.0 - 0.2 K/UL  
 ABS. IMM. GRANS. 0.1 0.0 - 0.5 K/UL  
 DF AUTOMATED METABOLIC PANEL, COMPREHENSIVE Collection Time: 03/08/20  1:58 PM  
Result Value Ref Range Sodium 135 (L) 136 - 145 mmol/L Potassium 5.8 (H) 3.5 - 5.1 mmol/L Chloride 100 98 - 107 mmol/L  
 CO2 15 (L) 21 - 32 mmol/L Anion gap 20 (H) 7 - 16 mmol/L Glucose 545 (HH) 65 - 100 mg/dL BUN 19 8 - 23 MG/DL Creatinine 1.63 (H) 0.6 - 1.0 MG/DL  
 GFR est AA 39 (L) >60 ml/min/1.73m2 GFR est non-AA 32 (L) >60 ml/min/1.73m2 Calcium 8.8 8.3 - 10.4 MG/DL  Bilirubin, total 0.4 0.2 - 1.1 MG/DL  
 ALT (SGPT) 27 12 - 65 U/L  
 AST (SGOT) 25 15 - 37 U/L Alk. phosphatase 157 (H) 50 - 136 U/L Protein, total 7.8 6.3 - 8.2 g/dL Albumin 1.0 (L) 3.2 - 4.6 g/dL Globulin 6.8 (H) 2.3 - 3.5 g/dL A-G Ratio 0.1 (L) 1.2 - 3.5 LACTIC ACID Collection Time: 03/08/20  1:58 PM  
Result Value Ref Range Lactic acid 2.1 (HH) 0.4 - 2.0 MMOL/L  
TROPONIN I Collection Time: 03/08/20  1:58 PM  
Result Value Ref Range Troponin-I, Qt. <0.02 (L) 0.02 - 0.05 NG/ML  
URINE MICROSCOPIC Collection Time: 03/08/20  2:14 PM  
Result Value Ref Range WBC  0 /hpf  
 RBC 10-20 0 /hpf Epithelial cells 0-3 0 /hpf Bacteria 2+ (H) 0 /hpf Casts 0 0 /lpf Crystals, urine 0 0 /LPF Mucus 0 0 /lpf Yeast MARKED Other observations RESULTS VERIFIED MANUALLY    
GLUCOSE, POC Collection Time: 03/08/20  3:21 PM  
Result Value Ref Range Glucose (POC) 479 (HH) 65 - 100 mg/dL [facility] Collection Time: 03/08/20  3:32 PM  
Result Value Ref Range Glucose 479 mg/dL Insulin order 8.4 units/hour Insulin adminstered 8.4 units/hour Multiplier 0.020 Low target 150 mg/dL High target 250 mg/dL D50 order 0.0 ml  
 D50 administered 0.00 ml Minutes until next BG 60 min Order initials sw Administered initials sw GLSCOM Comments GLUCOSE, POC Collection Time: 03/08/20  4:36 PM  
Result Value Ref Range Glucose (POC) 419 (H) 65 - 100 mg/dL [place] Agreste Collection Time: 03/08/20  4:37 PM  
Result Value Ref Range Glucose 419 mg/dL Insulin order 10.8 units/hour Insulin adminstered 10.8 units/hour Multiplier 0.030 Low target 150 mg/dL High target 250 mg/dL D50 order 0.0 ml  
 D50 administered 0.00 ml Minutes until next BG 60 min Order initials sw Administered initials vc   
 GLSCOM Comments METABOLIC PANEL, BASIC Collection Time: 03/08/20  4:38 PM  
Result Value Ref Range  Sodium 140 136 - 145 mmol/L  
 Potassium 3.8 3.5 - 5.1 mmol/L Chloride 104 98 - 107 mmol/L  
 CO2 31 21 - 32 mmol/L Anion gap 5 (L) 7 - 16 mmol/L Glucose 391 (H) 65 - 100 mg/dL BUN 21 8 - 23 MG/DL Creatinine 1.53 (H) 0.6 - 1.0 MG/DL  
 GFR est AA 42 (L) >60 ml/min/1.73m2 GFR est non-AA 35 (L) >60 ml/min/1.73m2 Calcium 8.3 8.3 - 10.4 MG/DL MAGNESIUM Collection Time: 03/08/20  4:38 PM  
Result Value Ref Range Magnesium 2.4 1.8 - 2.4 mg/dL PHOSPHORUS Collection Time: 03/08/20  4:38 PM  
Result Value Ref Range Phosphorus 1.8 (L) 2.3 - 3.7 MG/DL  
HEMOGLOBIN A1C WITH EAG Collection Time: 03/08/20  4:38 PM  
Result Value Ref Range Hemoglobin A1c 12.7 (H) 4.8 - 6.0 % Est. average glucose 318 mg/dL GLUCOSE, POC Collection Time: 03/08/20  5:38 PM  
Result Value Ref Range Glucose (POC) 251 (H) 65 - 100 mg/dL Karron Ree Collection Time: 03/08/20  5:41 PM  
Result Value Ref Range Glucose 251 mg/dL Insulin order 5.7 units/hour Insulin adminstered 5.7 units/hour Multiplier 0.030 Low target 150 mg/dL High target 250 mg/dL D50 order 0.0 ml  
 D50 administered 0.00 ml Minutes until next BG 60 min Order initials sw Administered initials vc   
 GLSCOM Comments GLUCOSE, POC Collection Time: 03/08/20  6:46 PM  
Result Value Ref Range Glucose (POC) 127 (H) 65 - 100 mg/dL Karron Ree Collection Time: 03/08/20  6:46 PM  
Result Value Ref Range Glucose 127 mg/dL Insulin order 1.3 units/hour Insulin adminstered 1.3 units/hour Multiplier 0.020 Low target 150 mg/dL High target 250 mg/dL D50 order 0.0 ml  
 D50 administered 0.00 ml Minutes until next BG 60 min Order initials bes Administered initials bes GLSCOM Comments GLUCOSE, POC Collection Time: 03/08/20  7:48 PM  
Result Value Ref Range Glucose (POC) 84 65 - 100 mg/dL Catalina Vann Collection Time: 03/08/20  7:49 PM  
Result Value Ref Range Glucose 84 mg/dL Insulin order 0.2 units/hour Insulin adminstered 0.2 units/hour Multiplier 0.010 Low target 150 mg/dL High target 250 mg/dL D50 order 0.0 ml  
 D50 administered 0.00 ml Minutes until next BG 60 min Order initials BM Administered initials BM   
 GLSCOM Comments METABOLIC PANEL, BASIC Collection Time: 03/08/20  8:43 PM  
Result Value Ref Range Sodium 143 136 - 145 mmol/L Potassium 3.9 3.5 - 5.1 mmol/L Chloride 108 (H) 98 - 107 mmol/L  
 CO2 27 21 - 32 mmol/L Anion gap 8 7 - 16 mmol/L Glucose 105 (H) 65 - 100 mg/dL BUN 21 8 - 23 MG/DL Creatinine 1.31 (H) 0.6 - 1.0 MG/DL  
 GFR est AA 50 (L) >60 ml/min/1.73m2 GFR est non-AA 41 (L) >60 ml/min/1.73m2 Calcium 8.6 8.3 - 10.4 MG/DL MAGNESIUM Collection Time: 03/08/20  8:43 PM  
Result Value Ref Range Magnesium 2.4 1.8 - 2.4 mg/dL GLUCOSE, POC Collection Time: 03/08/20  8:53 PM  
Result Value Ref Range Glucose (POC) 107 (H) 65 - 100 mg/dL Sinai Lopez Collection Time: 03/08/20  8:53 PM  
Result Value Ref Range Glucose 107 mg/dL Insulin order 0.0 units/hour Insulin adminstered 0.0 units/hour Multiplier 0.000 Low target 150 mg/dL High target 250 mg/dL D50 order 0.0 ml  
 D50 administered 0.00 ml Minutes until next BG 60 min Order initials BM Administered initials BM   
 GLSCOM Comments GLUCOSE, POC Collection Time: 03/08/20 10:01 PM  
Result Value Ref Range Glucose (POC) 128 (H) 65 - 100 mg/dL Sinai Lopez Collection Time: 03/08/20 10:01 PM  
Result Value Ref Range Glucose 128 mg/dL Insulin order 0.0 units/hour Insulin adminstered 0.0 units/hour Multiplier 0.000 Low target 150 mg/dL High target 250 mg/dL D50 order 0.0 ml  
 D50 administered 0.00 ml Minutes until next BG 60 min Order initials BM Administered initials BM   
 GLSCOM Comments CULTURE, URINE  
 Collection Time: 03/08/20 11:17 PM  
Result Value Ref Range Special Requests: NO SPECIAL REQUESTS Culture result:     
  NO GROWTH AFTER SHORT PERIOD OF INCUBATION. FURTHER RESULTS TO FOLLOW AFTER OVERNIGHT INCUBATION. URINALYSIS W/ RFLX MICROSCOPIC Collection Time: 03/08/20 11:17 PM  
Result Value Ref Range Color YELLOW Appearance CLOUDY Specific gravity 1.025 (H) 1.001 - 1.023    
 pH (UA) 6.0 5.0 - 9.0 Protein 100 (A) NEG mg/dL Glucose NEGATIVE  NEG mg/dL Ketone NEGATIVE  NEG mg/dL Bilirubin NEGATIVE  NEG Blood LARGE (A) NEG Urobilinogen 1.0 0.2 - 1.0 EU/dL Nitrites NEGATIVE  NEG Leukocyte Esterase MODERATE (A) NEG    
 WBC 10-20 0 /hpf  
 RBC 3-5 0 /hpf Bacteria TRACE 0 /hpf Yeast OCCASIONAL Other observations MICROSCOPIC PERFORMED ON UNSPUN URINE SAMPLE. CBC W/O DIFF Collection Time: 03/09/20  3:43 AM  
Result Value Ref Range WBC 9.6 4.3 - 11.1 K/uL  
 RBC 3.63 (L) 4.05 - 5.2 M/uL  
 HGB 10.8 (L) 11.7 - 15.4 g/dL HCT 33.7 (L) 35.8 - 46.3 % MCV 92.8 79.6 - 97.8 FL  
 MCH 29.8 26.1 - 32.9 PG  
 MCHC 32.0 31.4 - 35.0 g/dL  
 RDW 12.8 11.9 - 14.6 % PLATELET 148 886 - 844 K/uL MPV 10.1 9.4 - 12.3 FL ABSOLUTE NRBC 0.00 0.0 - 0.2 K/uL METABOLIC PANEL, BASIC Collection Time: 03/09/20  3:43 AM  
Result Value Ref Range Sodium 142 136 - 145 mmol/L Potassium 3.9 3.5 - 5.1 mmol/L Chloride 109 (H) 98 - 107 mmol/L  
 CO2 28 21 - 32 mmol/L Anion gap 5 (L) 7 - 16 mmol/L Glucose 157 (H) 65 - 100 mg/dL BUN 22 8 - 23 MG/DL Creatinine 1.35 (H) 0.6 - 1.0 MG/DL  
 GFR est AA 48 (L) >60 ml/min/1.73m2 GFR est non-AA 40 (L) >60 ml/min/1.73m2 Calcium 8.2 (L) 8.3 - 10.4 MG/DL  
GLUCOSE, POC Collection Time: 03/09/20  8:26 AM  
Result Value Ref Range Glucose (POC) 121 (H) 65 - 100 mg/dL All Micro Results Procedure Component Value Units Date/Time CULTURE, URINE [[id number]] Collected:  03/08/20 2317 Order Status:  Completed Specimen:  Cath Urine Updated:  03/09/20 0133 Special Requests: NO SPECIAL REQUESTS Culture result:    
  NO GROWTH AFTER SHORT PERIOD OF INCUBATION. FURTHER RESULTS TO FOLLOW AFTER OVERNIGHT INCUBATION. CULTURE, BLOOD [[id number]] Collected:  03/08/20 2043 Order Status:  Completed Specimen:  Blood Updated:  03/08/20 2121 CULTURE, BLOOD [[id number]] Collected:  03/08/20 1356 Order Status:  Completed Specimen:  Blood Updated:  03/08/20 1834 Current Meds: 
Current Facility-Administered Medications Medication Dose Route Frequency  clopidogreL (PLAVIX) tablet 75 mg  75 mg Oral DAILY  gabapentin (NEURONTIN) capsule 100 mg  100 mg Oral TID  rOPINIRole (REQUIP) tablet 0.5 mg  0.5 mg Oral QHS PRN  
 rosuvastatin (CRESTOR) tablet 20 mg  20 mg Oral QHS  losartan (COZAAR) tablet 100 mg  100 mg Oral DAILY  sodium chloride (NS) flush 5-40 mL  5-40 mL IntraVENous Q8H  
 sodium chloride (NS) flush 5-40 mL  5-40 mL IntraVENous PRN  
 tuberculin injection 5 Units  5 Units IntraDERMal ONCE  
 acetaminophen (TYLENOL) tablet 650 mg  650 mg Oral Q4H PRN  
 HYDROcodone-acetaminophen (NORCO) 5-325 mg per tablet 1 Tab  1 Tab Oral Q6H PRN  
 naloxone (NARCAN) injection 0.4 mg  0.4 mg IntraVENous PRN  
 senna-docusate (PERICOLACE) 8.6-50 mg per tablet 1 Tab  1 Tab Oral DAILY  heparin (porcine) injection 5,000 Units  5,000 Units SubCUTAneous Q12H  
 metoprolol tartrate (LOPRESSOR) tablet 12.5 mg  12.5 mg Oral Q12H  
 methocarbamoL (ROBAXIN) tablet 500 mg  500 mg Oral TID PRN  
 insulin glargine (LANTUS) injection 30 Units  30 Units SubCUTAneous QHS  insulin lispro (HUMALOG) injection   SubCUTAneous AC&HS  
 0.45% sodium chloride infusion  75 mL/hr IntraVENous CONTINUOUS Other Studies: No results found for this visit on 03/08/20. Xr Chest Sngl V Result Date: 3/8/2020 CHEST X-RAY, one view. HISTORY:  Hypoxia. TECHNIQUE:  AP portable  view. COMPARISON: September 2019 FINDINGS:   *The lungs: are clear. *The heart size: is borderline *The costophrenic angles: are sharp. *The pulmonary vasculature: is unremarkable. *Included portion of the upper abdomen: is unremarkable. *Bones: There are sternal wires. *Other: None. IMPRESSION:  Negative for acute change Assessment and Plan:  
 
Hospital Problems as of 3/9/2020 Date Reviewed: 8/10/2019 Codes Class Noted - Resolved POA Dehydration ICD-10-CM: E86.0 ICD-9-CM: 276.51  3/8/2020 - Present Yes * (Principal) Diabetic ketoacidosis ([facility] [address] ICD-10-CM: E11.10 ICD-9-CM: 250.10  3/8/2020 - Present Yes Hypothermia ICD-10-CM: T68. Hugo Phelps ICD-9-CM: 991.6  3/8/2020 - Present Unknown Metabolic acidosis MOW-64-TQ: E87.2 ICD-9-CM: 276.2  3/8/2020 - Present Unknown Noncompliance ICD-10-CM: Z91.19 ICD-9-CM: V15.81  3/8/2020 - Present Unknown Hypoalbuminemia due to protein-calorie malnutrition [facility]) ICD-10-CM: E46 
ICD-9-CM: 263.9  3/8/2020 - Present Unknown Generalized weakness ICD-10-CM: R53.1 ICD-9-CM: 780.79  9/16/2019 - Present Yes Coronary artery disease involving coronary bypass graft of native heart without angina pectoris (Chronic) ICD-10-CM: C65.859 ICD-9-CM: 414.05  6/15/2015 - Present Yes Uncontrolled type 2 diabetes mellitus with hyperglycemia (HCC) (Chronic) ICD-10-CM: E11.65 ICD-9-CM: 250.02  6/15/2015 - Present Yes Essential hypertension (Chronic) ICD-10-CM: I10 
ICD-9-CM: 401.9  6/15/2015 - Present Yes HLD (hyperlipidemia) (Chronic) ICD-10-CM: R41.3 ICD-9-CM: 272.4  6/15/2015 - Present Yes Chronic kidney disease, stage III (moderate) (HCC) (Chronic) ICD-10-CM: N18.3 ICD-9-CM: 585.3  6/15/2015 - Present Yes Gastroesophageal reflux disease without esophagitis (Chronic) ICD-10-CM: K21.9 ICD-9-CM: 530.81  6/15/2015 - Present Yes Peripheral neuropathy (Chronic) ICD-10-CM: G62.9 ICD-9-CM: 356.9  6/15/2015 - Present Yes Insomnia ICD-10-CM: G47.00 ICD-9-CM: 780.52  6/15/2015 - Present Yes  
   
 RLS (restless legs syndrome) (Chronic) ICD-10-CM: E71.80 ICD-9-CM: 333.94  6/15/2015 - Present Yes Plan: # DKA - Now resolved. Off insulin drip. - Takes 50U basal at home but started on 30, AM sugars great so will leave that the same, but may need to titrate as PO intake improves. Con't SSI. Orals held. A1C 12.7. 
 - I don't think she's septic. Stop meropenem. # CKD - At baseline. Cr better today. # HTN 
 - Home meds (BB held this AM for mild bradycardia) # HLD 
 - statin # CAD 
 - Plavix, statin DC planning/Dispo: Unclear. Reportedly an unsafe living situation at home, CM and PT consults. PPD placed. Transfer to floor today. Diet:  DIET DIABETIC CONSISTENT CARB 
DVT ppx: Heparin Signed: 
Elena Rodrigez MD

## 2020-03-09 NOTE — PROGRESS NOTES
TRANSFER - OUT REPORT: 
 
Verbal report given to Gaby Rowe on Ene Marker  being transferred to 6th floor for routine progression of care Report consisted of patients Situation, Background, Assessment and  
Recommendations(SBAR). Information from the following report(s) SBAR, Kardex, ED Summary, MAR, Recent Results, Med Rec Status, Cardiac Rhythm NSR and Alarm Parameters  was reviewed with the receiving nurse. Lines:  
Peripheral IV 03/08/20 Left Antecubital (Active) Site Assessment Clean, dry, & intact 3/9/2020 10:17 AM  
Phlebitis Assessment 0 3/9/2020 10:17 AM  
Infiltration Assessment 0 3/9/2020 10:17 AM  
Dressing Status Clean, dry, & intact 3/9/2020 10:17 AM  
Dressing Type Tape;Transparent 3/9/2020 10:17 AM  
Hub Color/Line Status Infusing 3/9/2020 10:17 AM  
Alcohol Cap Used No 3/9/2020  8:00 AM  
  
 
Opportunity for questions and clarification was provided. Patient transported with: 
 Monitor Tech

## 2020-03-09 NOTE — DIABETES MGMT
Patient admitted with DKA history noncompliance, which was also reported by home care aide in ER. Patient well known to diabetes management team as she has been seen multiple times during previous admissions. Per previous conversations patient started taking insulin for diabetes in \"1985. \" Patient also has a positive family history of diabetes. 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 At that time patient states she could still see the numbers on the insulin pens with glasses, but would also have her grandchildren check the dose. Patient prefers insulin pens. Admitting blood glucose 545. HbA1c 12.7 (eAG 318). Patient transitioned off insulin gtt at 2202 last night. Patient received Lantus 30 units. This AM glucose 121. Per provider patient blood glucose levels stable at this time. Per provider educator does not need to see patient at this time. Noted per chart review CM and PT consulted for reported unsafe home living situation. Will sign off, please re-consult if further diabetic education is desired.

## 2020-03-09 NOTE — PROGRESS NOTES
Problem: Mobility Impaired (Adult and Pediatric) Goal: *Acute Goals and Plan of Care (Insert Text) Description LTG: 
(1.)Ms. Delores Wong will move from supine to sit and sit to supine , scoot up and down and roll side to side in bed with MODIFIED INDEPENDENCE within 7 treatment day(s). (2.)Ms. Delores Wong will transfer from bed to chair and chair to bed with MODIFIED INDEPENDENCE using the least restrictive device within 7 treatment day(s). (3.)Ms. Delores Wong will ambulate with MODIFIED INDEPENDENCE for 90+ feet with the least restrictive device within 7 treatment day(s). (4.)Ms. Delores Wong will perform standing static and dynamic balance activities x 15 minutes with SUPERVISION to improve safety within 7 treatment day(s). ________________________________________________________________________________________________ Outcome: Progressing Towards Goal 
  
PHYSICAL THERAPY: Initial Assessment and AM 3/9/2020 INPATIENT: PT Visit Days : 1 Payor: Emiliano Salazar / Plan: [address] / Product Type: [employer] Medicare /   
  
NAME/AGE/GENDER: Clayton Vargas is a 78 y.o. female PRIMARY DIAGNOSIS: Diabetic ketoacidosis ([facility] [address] [E11.10] Metabolic acidosis [T65.2] Dehydration [E86.0] Noncompliance [Z91.19] Hypothermia [T68. XXXA] Diabetic ketoacidosis ([facility] [address] Diabetic ketoacidosis ([facility] [address] ICD-10: Treatment Diagnosis:  
 Generalized Muscle Weakness (M62.81) Difficulty in walking, Not elsewhere classified (R26.2) Precaution/Allergies: 
Sulfa (sulfonamide antibiotics) ASSESSMENT:  
 
Ms. Delores Wong is a 78 y.o. female in the hospital for the above who was supine in bed upon arrival.  Ms. Delores Wong presents to PT with [facility] AROM and decreased strength in B LEs. Pt performed bed mobility with SBA and intact sitting balance. She stood today with Bonita and RW demonstrating fair standing balance. Pt ambulated in room and around ortega with CGA/RW.   Pt required cuing for forward flexed posture and management of RW. She presented with decreased gait speed and step clearance today. Ms. Valerie Covington could benefit from skilled PT as she is currently functioning below her baseline. This section established at most recent assessment PROBLEM LIST (Impairments causing functional limitations): 
Decreased Strength Decreased ADL/Functional Activities Decreased Transfer Abilities Decreased Ambulation Ability/Technique Decreased Balance Decreased Activity Tolerance INTERVENTIONS PLANNED: (Benefits and precautions of physical therapy have been discussed with the patient.) Balance Exercise Bed Mobility Family Education Gait Training Home Exercise Program (HEP) Therapeutic Activites Therapeutic Exercise/Strengthening Transfer Training TREATMENT PLAN: Frequency/Duration: 3 times a week for duration of hospital stay Rehabilitation Potential For Stated Goals: Good REHAB RECOMMENDATIONS (at time of discharge pending progress):   
Placement: It is my opinion, based on this patient's performance to date, that Ms. Valerie Covington may benefit from participating in 1-2 additional therapy sessions in order to continue to assess for rehab potential and then make recommendation for disposition at discharge. Equipment:  
None at this time HISTORY:  
History of Present Injury/Illness (Reason for Referral): 
dehydration Past Medical History/Comorbidities: Ms. Valerie Covington  has a past medical history of Acute cystitis without hematuria (1/30/2019), CAD (coronary artery disease), DM2 (diabetes mellitus, type 2) ([facility] [address] Gout, HLD (hyperlipidemia), HTN (hypertension), and Peripheral neuropathy. Ms. Valerie Covington  has a past surgical history that includes hx tubal ligation; hx coronary artery bypass graft; pr cardiac surg procedure unlist; and hx cholecystectomy. Social History/Living Environment:  
Home Environment: Trailer/mobile home # Steps to Enter: 4 Rails to Enter:  Yes 
 Living Alone: No 
Support Systems: Child(marga) Patient Expects to be Discharged to[de-identified] Unknown Current DME Used/Available at Home: Ethel Pina, juanita, [place], straight Prior Level of Function/Work/Activity: 
Was living in mobile home with one of her daughters. PTA ambulating with rollator or SPC. Number of Personal Factors/Comorbidities that affect the Plan of Care: 1-2: MODERATE COMPLEXITY EXAMINATION:  
Most Recent Physical Functioning:  
Gross Assessment: 
AROM: Within functional limits Strength: Generally decreased, functional 
         
  
Posture: 
  
Balance: 
Sitting: Intact Standing: Impaired Standing - Static: Good Standing - Dynamic : Fair Bed Mobility: 
Supine to Sit: Stand-by assistance Scooting: Contact guard assistance Wheelchair Mobility: 
  
Transfers: 
Sit to Stand: Minimum assistance Stand to Sit: Contact guard assistance Bed to Chair: Contact guard assistance Gait: 
  
Speed/Noemi: Slow Step Length: Right shortened;Left shortened Gait Abnormalities: Decreased step clearance;Trunk sway increased Distance (ft): 80 Feet (ft)(x 3) Assistive Device: Walker, rolling Ambulation - Level of Assistance: Contact guard assistance Body Structures Involved: Metabolic Endocrine Muscles Body Functions Affected: 
Neuromusculoskeletal 
Movement Related Metobolic/Endocrine Activities and Participation Affected: 
General Tasks and Demands Mobility Domestic Life Community, Social and [place] Number of elements that affect the Plan of Care: 4+: HIGH COMPLEXITY CLINICAL PRESENTATION:  
Presentation: Stable and uncomplicated: LOW COMPLEXITY CLINICAL DECISION MAKIN [facility] [address] [address] AM-PAC 6 Clicks Basic Mobility Inpatient Short Form How much difficulty does the patient currently have. .. Unable A Lot A Little None 1. Turning over in bed (including adjusting bedclothes, sheets and blankets)? [] 1   [] 2   [] 3   [x] 4 2.  Sitting down on and standing up from a chair with arms ( e.g., wheelchair, bedside commode, etc.)   [] 1   [] 2   [x] 3   [] 4  
3. Moving from lying on back to sitting on the side of the bed? [] 1   [] 2   [] 3   [x] 4 How much help from another person does the patient currently need. .. Total A Lot A Little None 4. Moving to and from a bed to a chair (including a wheelchair)? [] 1   [] 2   [x] 3   [] 4  
5. Need to walk in hospital room? [] 1   [] 2   [] 3   [x] 4  
6. Climbing 3-5 steps with a railing? [] 1   [] 2   [x] 3   [] 4  
© 2007, Trustees of [address] [place] [address] [address] [id number], under license to [employer]. All rights reserved Score:  Initial: 21 Most Recent: X (Date: -- ) Interpretation of Tool:  Represents activities that are increasingly more difficult (i.e. Bed mobility, Transfers, Gait). Medical Necessity:    
Patient demonstrates  
good 
 rehab potential due to higher previous functional level. Reason for Services/Other Comments: 
Patient continues to require skilled intervention due to Decreased balance and functional mobility Walter Ra Use of outcome tool(s) and clinical judgement create a POC that gives a: Clear prediction of patient's progress: LOW COMPLEXITY  
  
 
 
 
TREATMENT:  
(In addition to Assessment/Re-Assessment sessions the following treatments were rendered) Pre-treatment Symptoms/Complaints:  None Pain: Initial:  
Pain Intensity 1: 0  Post Session:  0 Gait Training ( 9 minutes):  Gait training to improve and/or restore physical functioning as related to mobility, strength, and balance. Ambulated 80 Feet (ft)(x 3) with Contact guard assistance using a Walker, rolling and minimal   related to their management of RW and standing posture. Braces/Orthotics/Lines/Etc:  
IV 
coyle catheter O2 Device: Room air Treatment/Session Assessment:   
Response to Treatment:  Tolerated well without complaint.  
Interdisciplinary Collaboration:  
Physical Therapist 
 Registered Nurse After treatment position/precautions:  
Up in chair Bed/Chair-wheels locked Bed in low position Nurse at bedside Compliance with Program/Exercises: Will assess as treatment progresses Recommendations/Intent for next treatment session: \"Next visit will focus on advancements to more challenging activities and reduction in assistance provided\". Total Treatment Duration: PT Patient Time In/Time Out Time In: 4277 Time Out: 1005 Tony Franco PT, DPT

## 2020-03-09 NOTE — PROGRESS NOTES
CM met with pt and pt's aide at bedside, to complete assessment. The pt confirmed is was ok to verify information. Pt presented alert and oriented. The pt and aide confirmed demographic information. Prior to ER admission, the pt was living in a senior complex. Pt states she will now be staying with her daughter upon discharge. There are 2 steps to enter into her daughter's home. The pt is not independent with completing ADL's. The pt's aide assists with ADL's. Aide is serviced through [employer] [phone]. Aide assists the pt 7 days a week. Per aide, pt is assisted during the week for five hours. The aide typically assists the pt after 1pm during weekdays and on weekends, the aide assists the pt in the mornings . The pt states in the past she received meals through a service and is unsure of the company's name. However, pt anticipates to resume meal prepping services when returning home. The pt receives her medications from [facility] on [facility]. No additional needs voiced at this time. PT/OT have been consulted. PPD has been placed. CM asked the pt, if STR is recommended, would the pt consider completing STR. The pt states she is open to STR recommendation. Per pt's aide, the [facility] with [employer] a month go. CM was receptive to this information. No additional needs voiced at this time. CM continues to follow pt during this hospitalization, to assist with discharge planning. Care Management Interventions PCP Verified by CM: Yes Mode of Transport at Discharge: Other (see comment) Transition of Care Consult (CM Consult): Discharge Planning Discharge Durable Medical Equipment: No(Pt confirmed rollator walker, cane, and wheelchair. ) Physical Therapy Consult: Yes Occupational Therapy Consult: Yes Speech Therapy Consult: No 
Current Support Network: Other(The pt states she was living in a senior complex. ) Confirm Follow Up Transport: Family(The pt's family and aide assist with transportation. ) The Plan for Transition of Care is Related to the Following Treatment Goals : The pt to obtain care to become medically stable and to be evaluated by PT/OT to assist with discharge planning. The Patient and/or Patient Representative was Provided with a Choice of Provider and Agrees with the Discharge Plan?: Yes Name of the Patient Representative Who was Provided with a Choice of Provider and Agrees with the Discharge Plan: Dena Correa Freedom of Choice List was Provided with Basic Dialogue that Supports the Patient's Individualized Plan of Care/Goals, Treatment Preferences and Shares the Quality Data Associated with the Providers?: Yes [employer] Information Provided?: No 
Discharge Location Discharge Placement: Unable to determine at this time

## 2020-03-09 NOTE — PROGRESS NOTES
03/09/20 1013 Dual Skin Pressure Injury Assessment Dual Skin Pressure Injury Assessment WDL Second Care Provider (Based on Facility Policy) Deanna Vizcarra, RN Skin Integumentary Skin Integumentary (WDL) WDL Pressure  Injury Documentation No Pressure Injury Noted-Pressure Ulcer Prevention Initiated

## 2020-03-09 NOTE — PROGRESS NOTES
Patient has closed Anion GAP X2 and normal bicarbonate. SHe uses Lantus 50 U at bedtime and premeal humalog and Victoza at home. Will stop her insulin drip. Will start Lantus 30 U now + humalog SS. Will allow her to eat. Will switch her to IV 0.45 NS.

## 2020-03-09 NOTE — PROGRESS NOTES
TRANSFER - IN REPORT: 
 
Verbal report received from [address] [place] [address] [place], RN(name) on Derrick Gomez  being received from ICU(unit) for routine progression of care Report consisted of patients Situation, Background, Assessment and  
Recommendations(SBAR). Information from the following report(s) SBAR was reviewed with the receiving nurse. Opportunity for questions and clarification was provided. Assessment completed upon patients arrival to unit and care assumed.

## 2020-03-09 NOTE — PROGRESS NOTES
Bedside, Verbal and Written shift change report given to Candice Ponce RN (oncoming nurse) by Viola Escobar RN (offgoing nurse). Report included the following information SBAR, Kardex, ED Summary, MAR, Recent Results, Med Rec Status, Cardiac Rhythm SR and Quality Measures.

## 2020-03-10 LAB
ANION GAP SERPL CALC-SCNC: 7 MMOL/L (ref 7–16)
BUN SERPL-MCNC: 18 MG/DL (ref 8–23)
CALCIUM SERPL-MCNC: 8.8 MG/DL (ref 8.3–10.4)
CHLORIDE SERPL-SCNC: 108 MMOL/L (ref 98–107)
CO2 SERPL-SCNC: 28 MMOL/L (ref 21–32)
CREAT SERPL-MCNC: 1.22 MG/DL (ref 0.6–1)
ERYTHROCYTE [DISTWIDTH] IN BLOOD BY AUTOMATED COUNT: 13.2 % (ref 11.9–14.6)
GLUCOSE BLD STRIP.AUTO-MCNC: 187 MG/DL (ref 65–100)
GLUCOSE BLD STRIP.AUTO-MCNC: 263 MG/DL (ref 65–100)
GLUCOSE BLD STRIP.AUTO-MCNC: 305 MG/DL (ref 65–100)
GLUCOSE BLD STRIP.AUTO-MCNC: 63 MG/DL (ref 65–100)
GLUCOSE SERPL-MCNC: 61 MG/DL (ref 65–100)
HCT VFR BLD AUTO: 32.8 % (ref 35.8–46.3)
HGB BLD-MCNC: 10.6 G/DL (ref 11.7–15.4)
MCH RBC QN AUTO: 29.5 PG (ref 26.1–32.9)
MCHC RBC AUTO-ENTMCNC: 32.3 G/DL (ref 31.4–35)
MCV RBC AUTO: 91.4 FL (ref 79.6–97.8)
MM INDURATION POC: 0 MM (ref 0–5)
NRBC # BLD: 0 K/UL (ref 0–0.2)
PLATELET # BLD AUTO: 263 K/UL (ref 150–450)
PMV BLD AUTO: 10 FL (ref 9.4–12.3)
POTASSIUM SERPL-SCNC: 3.8 MMOL/L (ref 3.5–5.1)
PPD POC: NEGATIVE NEGATIVE
RBC # BLD AUTO: 3.59 M/UL (ref 4.05–5.2)
SODIUM SERPL-SCNC: 143 MMOL/L (ref 136–145)
WBC # BLD AUTO: 8.2 K/UL (ref 4.3–11.1)

## 2020-03-10 PROCEDURE — 97116 GAIT TRAINING THERAPY: CPT

## 2020-03-10 PROCEDURE — 85027 COMPLETE CBC AUTOMATED: CPT

## 2020-03-10 PROCEDURE — 74011250637 HC RX REV CODE- 250/637: Performed by: INTERNAL MEDICINE

## 2020-03-10 PROCEDURE — 74011250636 HC RX REV CODE- 250/636: Performed by: HOSPITALIST

## 2020-03-10 PROCEDURE — 36415 COLL VENOUS BLD VENIPUNCTURE: CPT

## 2020-03-10 PROCEDURE — 74011250637 HC RX REV CODE- 250/637: Performed by: HOSPITALIST

## 2020-03-10 PROCEDURE — 74011000258 HC RX REV CODE- 258: Performed by: INTERNAL MEDICINE

## 2020-03-10 PROCEDURE — 74011636637 HC RX REV CODE- 636/637: Performed by: FAMILY MEDICINE

## 2020-03-10 PROCEDURE — 82962 GLUCOSE BLOOD TEST: CPT

## 2020-03-10 PROCEDURE — 80048 BASIC METABOLIC PNL TOTAL CA: CPT

## 2020-03-10 PROCEDURE — 65660000000 HC RM CCU STEPDOWN

## 2020-03-10 PROCEDURE — 74011636637 HC RX REV CODE- 636/637: Performed by: INTERNAL MEDICINE

## 2020-03-10 RX ORDER — INSULIN LISPRO 100 [IU]/ML
3 INJECTION, SOLUTION INTRAVENOUS; SUBCUTANEOUS
Status: DISCONTINUED | OUTPATIENT
Start: 2020-03-11 | End: 2020-03-11

## 2020-03-10 RX ORDER — INSULIN GLARGINE 100 [IU]/ML
35 INJECTION, SOLUTION SUBCUTANEOUS
Status: DISCONTINUED | OUTPATIENT
Start: 2020-03-10 | End: 2020-03-11

## 2020-03-10 RX ADMIN — INSULIN LISPRO 6 UNITS: 100 INJECTION, SOLUTION INTRAVENOUS; SUBCUTANEOUS at 21:28

## 2020-03-10 RX ADMIN — METOPROLOL TARTRATE 12.5 MG: 25 TABLET, FILM COATED ORAL at 08:30

## 2020-03-10 RX ADMIN — Medication 10 ML: at 21:28

## 2020-03-10 RX ADMIN — Medication 10 ML: at 13:13

## 2020-03-10 RX ADMIN — CLOPIDOGREL BISULFATE 75 MG: 75 TABLET ORAL at 08:30

## 2020-03-10 RX ADMIN — HEPARIN SODIUM 5000 UNITS: 5000 INJECTION INTRAVENOUS; SUBCUTANEOUS at 06:12

## 2020-03-10 RX ADMIN — GABAPENTIN 100 MG: 100 CAPSULE ORAL at 21:25

## 2020-03-10 RX ADMIN — INSULIN LISPRO 8 UNITS: 100 INJECTION, SOLUTION INTRAVENOUS; SUBCUTANEOUS at 11:54

## 2020-03-10 RX ADMIN — SODIUM CHLORIDE 75 ML/HR: 450 INJECTION, SOLUTION INTRAVENOUS at 00:25

## 2020-03-10 RX ADMIN — INSULIN LISPRO 2 UNITS: 100 INJECTION, SOLUTION INTRAVENOUS; SUBCUTANEOUS at 16:48

## 2020-03-10 RX ADMIN — METOPROLOL TARTRATE 12.5 MG: 25 TABLET, FILM COATED ORAL at 21:25

## 2020-03-10 RX ADMIN — HEPARIN SODIUM 5000 UNITS: 5000 INJECTION INTRAVENOUS; SUBCUTANEOUS at 16:48

## 2020-03-10 RX ADMIN — GABAPENTIN 100 MG: 100 CAPSULE ORAL at 08:30

## 2020-03-10 RX ADMIN — LOSARTAN POTASSIUM 100 MG: 50 TABLET, FILM COATED ORAL at 08:30

## 2020-03-10 RX ADMIN — GABAPENTIN 100 MG: 100 CAPSULE ORAL at 16:49

## 2020-03-10 RX ADMIN — Medication 10 ML: at 07:16

## 2020-03-10 RX ADMIN — Medication 1 AMPULE: at 21:24

## 2020-03-10 RX ADMIN — SENNOSIDES AND DOCUSATE SODIUM 1 TABLET: 8.6; 5 TABLET ORAL at 08:30

## 2020-03-10 RX ADMIN — INSULIN GLARGINE 35 UNITS: 100 INJECTION, SOLUTION SUBCUTANEOUS at 21:27

## 2020-03-10 RX ADMIN — Medication 1 AMPULE: at 08:30

## 2020-03-10 RX ADMIN — ROSUVASTATIN CALCIUM 20 MG: 20 TABLET, COATED ORAL at 21:25

## 2020-03-10 NOTE — PROGRESS NOTES
Problem: Mobility Impaired (Adult and Pediatric) Goal: *Acute Goals and Plan of Care (Insert Text) Description LTG: 
(1.)Ms. Conner Gupta will move from supine to sit and sit to supine , scoot up and down and roll side to side in bed with MODIFIED INDEPENDENCE within 7 treatment day(s). (2.)Ms. Conner Gupta will transfer from bed to chair and chair to bed with MODIFIED INDEPENDENCE using the least restrictive device within 7 treatment day(s). (3.)Ms. Conner Gupta will ambulate with MODIFIED INDEPENDENCE for 90+ feet with the least restrictive device within 7 treatment day(s). (4.)Ms. Conner Gupta will perform standing static and dynamic balance activities x 15 minutes with SUPERVISION to improve safety within 7 treatment day(s). ________________________________________________________________________________________________ Outcome: Progressing Towards Goal 
  
PHYSICAL THERAPY: Daily Note and PM 3/10/2020 INPATIENT: PT Visit Days : 2 Payor: Wiley Mckeon / Plan: [address] / Product Type: [employer] Medicare /   
  
NAME/AGE/GENDER: Jim Wu is a 78 y.o. female PRIMARY DIAGNOSIS: Diabetic ketoacidosis ([facility] [address] [E11.10] Metabolic acidosis [B81.5] Dehydration [E86.0] Noncompliance [Z91.19] Hypothermia [T68. XXXA] Diabetic ketoacidosis ([address] Diabetic ketoacidosis ([facility] [address] ICD-10: Treatment Diagnosis:  
 · Generalized Muscle Weakness (M62.81) · Difficulty in walking, Not elsewhere classified (R26.2) Precaution/Allergies: 
Sulfa (sulfonamide antibiotics) ASSESSMENT:  
 
Ms. Conner Gupta is a 78 y.o. female in the hospital for the above who was supine in bed upon arrival. She denies pain at this time. Ms. Conner Gupta presents to PT with [facility] AROM and decreased strength in B LEs. Pt performed bed mobility with SBA and intact sitting balance. She stood today with Bonita and RW demonstrating fair standing balance.   Pt ambulated in room and around ortega with CGA/RW 150ft. Pt required cuing for forward flexed posture and management of RW. She presented with decreased gait speed and step clearance today. Ms. Erika Serrato could benefit from skilled PT as she is currently functioning below her baseline. She is progressing towards goals. This section established at most recent assessment PROBLEM LIST (Impairments causing functional limitations): 1. Decreased Strength 2. Decreased ADL/Functional Activities 3. Decreased Transfer Abilities 4. Decreased Ambulation Ability/Technique 5. Decreased Balance 6. Decreased Activity Tolerance INTERVENTIONS PLANNED: (Benefits and precautions of physical therapy have been discussed with the patient.) 1. Balance Exercise 2. Bed Mobility 3. Family Education 4. Gait Training 5. Home Exercise Program (HEP) 6. Therapeutic Activites 7. Therapeutic Exercise/Strengthening 8. Transfer Training TREATMENT PLAN: Frequency/Duration: 3 times a week for duration of hospital stay Rehabilitation Potential For Stated Goals: Good REHAB RECOMMENDATIONS (at time of discharge pending progress):   
Placement: It is my opinion, based on this patient's performance to date, that Ms. Erika Serrato may benefit from participating in 1-2 additional therapy sessions in order to continue to assess for rehab potential and then make recommendation for disposition at discharge. Equipment:  
? None at this time HISTORY:  
History of Present Injury/Illness (Reason for Referral): 
dehydration Past Medical History/Comorbidities: Ms. Erika Serrato  has a past medical history of Acute cystitis without hematuria (1/30/2019), CAD (coronary artery disease), DM2 (diabetes mellitus, type 2) ([facility] [address] Gout, HLD (hyperlipidemia), HTN (hypertension), and Peripheral neuropathy. Ms. Erika Serrato  has a past surgical history that includes hx tubal ligation; hx coronary artery bypass graft; pr cardiac surg procedure unlist; and hx cholecystectomy. Social History/Living Environment:  
Home Environment: Apartment # Steps to Enter: 4 Rails to Enter: Yes One/Two Story Residence: One story Living Alone: Yes Support Systems: Child(marga) Patient Expects to be Discharged to[de-identified] Rehabilitation facility Current DME Used/Available at Home: Everardo Martin, straight, Shower chair, Walker, rollator Tub or Shower Type: Tub/Shower combination Prior Level of Function/Work/Activity: 
Was living in mobile home with one of her daughters. PTA ambulating with rollator or SPC. Number of Personal Factors/Comorbidities that affect the Plan of Care: 1-2: MODERATE COMPLEXITY EXAMINATION:  
Most Recent Physical Functioning:  
Gross Assessment: 
  
         
  
Posture: 
  
Balance: 
Sitting: Intact Standing: Impaired Standing - Static: Good Standing - Dynamic : Fair Bed Mobility: 
Rolling: Stand-by assistance Supine to Sit: Stand-by assistance Scooting: Contact guard assistance Wheelchair Mobility: 
  
Transfers: 
Sit to Stand: Contact guard assistance;Minimum assistance Stand to Sit: Contact guard assistance Bed to Chair: Contact guard assistance;Minimum assistance Gait: 
  
Speed/Noemi: Slow Step Length: Left shortened;Right shortened Gait Abnormalities: Decreased step clearance;Trunk sway increased Distance (ft): 150 Feet (ft) Assistive Device: Gait belt;Walker Ambulation - Level of Assistance: Contact guard assistance Body Structures Involved: 1. Metabolic 2. Endocrine 3. Muscles Body Functions Affected: 1. Neuromusculoskeletal 
2. Movement Related 3. Metobolic/Endocrine Activities and Participation Affected: 1. General Tasks and Demands 2. Mobility 3. Domestic Life 4. Community, Social and [place] Number of elements that affect the Plan of Care: 4+: HIGH COMPLEXITY CLINICAL PRESENTATION:  
Presentation: Stable and uncomplicated: LOW COMPLEXITY CLINICAL DECISION MAKIN [facility] [address] [address] AM-PAC 6 Clicks Basic Mobility Inpatient Short Form How much difficulty does the patient currently have. .. Unable A Lot A Little None 1. Turning over in bed (including adjusting bedclothes, sheets and blankets)? [] 1   [] 2   [] 3   [x] 4  
2. Sitting down on and standing up from a chair with arms ( e.g., wheelchair, bedside commode, etc.)   [] 1   [] 2   [x] 3   [] 4  
3. Moving from lying on back to sitting on the side of the bed? [] 1   [] 2   [] 3   [x] 4 How much help from another person does the patient currently need. .. Total A Lot A Little None 4. Moving to and from a bed to a chair (including a wheelchair)? [] 1   [] 2   [x] 3   [] 4  
5. Need to walk in hospital room? [] 1   [] 2   [] 3   [x] 4  
6. Climbing 3-5 steps with a railing? [] 1   [] 2   [x] 3   [] 4  
© 2007, Trustees of [address] [place] [address], under license to [employer]. All rights reserved Score:  Initial: 21 Most Recent: X (Date: -- ) Interpretation of Tool:  Represents activities that are increasingly more difficult (i.e. Bed mobility, Transfers, Gait). Medical Necessity:    
· Patient demonstrates · good ·  rehab potential due to higher previous functional level. Reason for Services/Other Comments: 
· Patient continues to require skilled intervention due to · Decreased balance and functional mobility · . Use of outcome tool(s) and clinical judgement create a POC that gives a: Clear prediction of patient's progress: LOW COMPLEXITY  
  
 
 
 
TREATMENT:  
(In addition to Assessment/Re-Assessment sessions the following treatments were rendered) Pre-treatment Symptoms/Complaints: the back of my legs feel tight Pain: Initial:  
  0 Post Session:  0 Gait Training ( 9 minutes):  Gait training to improve and/or restore physical functioning as related to mobility, strength, and balance.   Ambulated 150 Feet (ft) with Contact guard assistance using a Gait belt;Walker and minimal   related to their management of RW and standing posture. Braces/Orthotics/Lines/Etc:  
· IV 
· coyle catheter · O2 Device: Room air Treatment/Session Assessment:   
· Response to Treatment:  Tolerated well without complaint. Noted tightness in posterior legs. · Interdisciplinary Collaboration:  
o Physical Therapist 
o Registered Nurse · After treatment position/precautions:  
o Up in chair 
o Bed/Chair-wheels locked 
o Bed in low position 
o Nurse at bedside · Compliance with Program/Exercises: Will assess as treatment progresses · Recommendations/Intent for next treatment session: \"Next visit will focus on advancements to more challenging activities and reduction in assistance provided\". Total Treatment Duration: PT Patient Time In/Time Out Time In: 0731 Time Out: 7270 Neville Watson, PT, DPT

## 2020-03-10 NOTE — PROGRESS NOTES
CM provided patient with Medicare approved STR facilities list.  ARAMIS explained to patient to choose 3 facilities and we would follow up with her for the choices. Patient stated she would discuss the facilities with her daughter.

## 2020-03-10 NOTE — PROGRESS NOTES
Date of Outreach Update: 
Cristina Victoria was seen and assessed. Pt awake, alert, oriented x 4, no acute concerns at this time. BS low this AM, pt states she did not have a snack before bed last night. Encouraged pt to snack before sleep. MEWS Score: 1 (03/10/20 1132) Vitals:  
 03/10/20 0024 03/10/20 0516 03/10/20 0630 03/10/20 1132 BP: 122/65 99/59 121/68 147/80 Pulse: 68 62 60 65 Resp: 18 18 18 18 Temp: 98 °F (36.7 °C) 98.5 °F (36.9 °C) 97.9 °F (36.6 °C) 98.3 °F (36.8 °C) SpO2: 94% 91% 95% 94% Weight:      
Height:      
  
 
 Pain Assessment Pain Intensity 1: 0 (03/09/20 1500) Patient Stated Pain Goal: 0 Previous Outreach assessment has been reviewed. There have been no significant clinical changes since the completion of the last dated Outreach assessment. Will continue to follow up per outreach protocol.  
 
Signed By:   Naomie Arce RN 
  March 10, 2020 12:31 PM

## 2020-03-10 NOTE — PROGRESS NOTES
Progress Note Patient: Chemo Garzon MRN: [id number]  SSN: [id number] YOB: 1940  Age: 78 y.o. Sex: female Admit Date: 3/8/2020 LOS: 2 days Subjective: F/U DKA 
 
77 yo hx of CAD, HTN, DM type 2, HLD, peripheral neuropathy, CKD stage III, ESBL UTI who was admitted on 3/8/20 for DKA. Glucose 545 on arrival, anion gap 20, bicarb 15. Originally placed in ICU and then quickly moved to regular floor. Admits to not using her insulin at home. A1C 12.7. Glucose 61 this AM. Glucose elevated at lunch. NO chest pain or SOB. Looking into nursing homes. Current Facility-Administered Medications Medication Dose Route Frequency  insulin glargine (LANTUS) injection 35 Units  35 Units SubCUTAneous QHS  lip protectant (BLISTEX) ointment 1 Each  1 Each Topical PRN  
 alcohol 62% (NOZIN) nasal  1 Ampule  1 Ampule Topical Q12H  
 benzonatate (TESSALON) capsule 100 mg  100 mg Oral TID PRN  
 clopidogreL (PLAVIX) tablet 75 mg  75 mg Oral DAILY  gabapentin (NEURONTIN) capsule 100 mg  100 mg Oral TID  rOPINIRole (REQUIP) tablet 0.5 mg  0.5 mg Oral QHS PRN  
 rosuvastatin (CRESTOR) tablet 20 mg  20 mg Oral QHS  losartan (COZAAR) tablet 100 mg  100 mg Oral DAILY  sodium chloride (NS) flush 5-40 mL  5-40 mL IntraVENous Q8H  
 sodium chloride (NS) flush 5-40 mL  5-40 mL IntraVENous PRN  
 acetaminophen (TYLENOL) tablet 650 mg  650 mg Oral Q4H PRN  
 HYDROcodone-acetaminophen (NORCO) 5-325 mg per tablet 1 Tab  1 Tab Oral Q6H PRN  
 naloxone (NARCAN) injection 0.4 mg  0.4 mg IntraVENous PRN  
 senna-docusate (PERICOLACE) 8.6-50 mg per tablet 1 Tab  1 Tab Oral DAILY  heparin (porcine) injection 5,000 Units  5,000 Units SubCUTAneous Q12H  
 metoprolol tartrate (LOPRESSOR) tablet 12.5 mg  12.5 mg Oral Q12H  
 methocarbamoL (ROBAXIN) tablet 500 mg  500 mg Oral TID PRN  
 insulin lispro (HUMALOG) injection   SubCUTAneous AC&HS Objective:  
 
Vitals: 03/10/20 0516 03/10/20 0630 03/10/20 1132 03/10/20 1507 BP: 99/59 121/68 147/80 159/74 Pulse: 62 60 65 68 Resp: 18 18 18 18 Temp: 98.5 °F (36.9 °C) 97.9 °F (36.6 °C) 98.3 °F (36.8 °C) 98.8 °F (37.1 °C) SpO2: 91% 95% 94% 93% Weight:      
Height:      
  
  
Intake and Output: 
Current Shift: 03/10 0701 - 03/10 1900 In: -  
Out: [address] Last three shifts: 1901 - 03/10 07 In: 2217 [I.V.:2217] Out: 1400 [YOMN] Physical Exam:  
General:  Alert, cooperative, no distress, appears stated age. Eyes:  Conjunctivae/corneas clear. Ears:  Normal TMs and external ear canals both ears. Nose: Nares normal. Septum midline. Mouth/Throat: Lips, mucosa, and tongue normal.   
Neck:  no JVD. Back:   deferred Lungs:   Clear to auscultation bilaterally. Heart:  Regular rate and rhythm, S1, S2 normal, no murmur, click, rub or gallop. Abdomen:   Soft, non-tender. Bowel sounds normal. No masses,  No organomegaly. Extremities: 1+ edema to LE bilaterally Pulses: 2+ and symmetric all extremities. Skin: Skin color, texture, turgor normal. No rashes or lesions Lymph nodes: Cervical, supraclavicular, and axillary nodes normal.  
Neurologic: CNII-XII intact. Limited ROM in chair. Lab/Data Review: 
 
Recent Results (from the past 24 hour(s)) GLUCOSE, POC Collection Time: 20  8:47 PM  
Result Value Ref Range Glucose (POC) 287 (H) 65 - 100 mg/dL PLEASE READ & DOCUMENT PPD TEST IN 24 HRS Collection Time: 20 11:31 PM  
Result Value Ref Range PPD    
 mm Induration GLUCOSE, POC Collection Time: 03/10/20  6:33 AM  
Result Value Ref Range Glucose (POC) 63 (L) 65 - 100 mg/dL CBC W/O DIFF Collection Time: 03/10/20  6:45 AM  
Result Value Ref Range WBC 8.2 4.3 - 11.1 K/uL  
 RBC 3.59 (L) 4.05 - 5.2 M/uL  
 HGB 10.6 (L) 11.7 - 15.4 g/dL HCT 32.8 (L) 35.8 - 46.3 %  MCV 91.4 79.6 - 97.8 FL  
 MCH 29.5 26.1 - 32.9 PG  
 MCHC 32.3 31.4 - 35.0 g/dL  
 RDW 13.2 11.9 - 14.6 % PLATELET 680 190 - 848 K/uL MPV 10.0 9.4 - 12.3 FL ABSOLUTE NRBC 0.00 0.0 - 0.2 K/uL METABOLIC PANEL, BASIC Collection Time: 03/10/20  6:45 AM  
Result Value Ref Range Sodium 143 136 - 145 mmol/L Potassium 3.8 3.5 - 5.1 mmol/L Chloride 108 (H) 98 - 107 mmol/L  
 CO2 28 21 - 32 mmol/L Anion gap 7 7 - 16 mmol/L Glucose 61 (L) 65 - 100 mg/dL BUN 18 8 - 23 MG/DL Creatinine 1.22 (H) 0.6 - 1.0 MG/DL  
 GFR est AA 54 (L) >60 ml/min/1.73m2 GFR est non-AA 45 (L) >60 ml/min/1.73m2 Calcium 8.8 8.3 - 10.4 MG/DL  
GLUCOSE, POC Collection Time: 03/10/20 11:36 AM  
Result Value Ref Range Glucose (POC) 305 (H) 65 - 100 mg/dL GLUCOSE, POC Collection Time: 03/10/20  3:11 PM  
Result Value Ref Range Glucose (POC) 187 (H) 65 - 100 mg/dL Assessment/ Plan:  
 
Principal Problem: 
  Diabetic ketoacidosis ([facility] [address] (3/8/2020) Active Problems: 
  Coronary artery disease involving coronary bypass graft of native heart without angina pectoris (6/15/2015) Uncontrolled type 2 diabetes mellitus with hyperglycemia ([address] (6/15/2015) Essential hypertension (6/15/2015) HLD (hyperlipidemia) (6/15/2015) Chronic kidney disease, stage III (moderate) (HCC) (6/15/2015) Gastroesophageal reflux disease without esophagitis (6/15/2015) Peripheral neuropathy (6/15/2015) Insomnia (6/15/2015) RLS (restless legs syndrome) (6/15/2015) Generalized weakness (9/16/2019) Dehydration (3/8/2020) Hypothermia (3/8/2020) Metabolic acidosis (6/2/9172) Noncompliance (3/8/2020) Hypoalbuminemia due to protein-calorie malnutrition ([address] (3/8/2020) DKA - secondary to noncompliance. Decrease Lantus to 35 units since AM glucose low. SS. Add scheduled 3 units with lunch of humalog. Consult DM management. CKD - stable HTN- Losartan 100mg daily. BB. HLD - statin CAD- Plavix DVT prophylaxis - heparin Look to dc once have nursing home placement. Signed By: Eduardo Parish DO March 10, 2020

## 2020-03-11 LAB
GLUCOSE BLD STRIP.AUTO-MCNC: 148 MG/DL (ref 65–100)
GLUCOSE BLD STRIP.AUTO-MCNC: 197 MG/DL (ref 65–100)
GLUCOSE BLD STRIP.AUTO-MCNC: 289 MG/DL (ref 65–100)
GLUCOSE BLD STRIP.AUTO-MCNC: 342 MG/DL (ref 65–100)
GLUCOSE BLD STRIP.AUTO-MCNC: 64 MG/DL (ref 65–100)

## 2020-03-11 PROCEDURE — 74011636637 HC RX REV CODE- 636/637: Performed by: INTERNAL MEDICINE

## 2020-03-11 PROCEDURE — 82962 GLUCOSE BLOOD TEST: CPT

## 2020-03-11 PROCEDURE — 74011250636 HC RX REV CODE- 250/636: Performed by: HOSPITALIST

## 2020-03-11 PROCEDURE — 97530 THERAPEUTIC ACTIVITIES: CPT

## 2020-03-11 PROCEDURE — 97110 THERAPEUTIC EXERCISES: CPT

## 2020-03-11 PROCEDURE — 65270000029 HC RM PRIVATE

## 2020-03-11 PROCEDURE — 74011636637 HC RX REV CODE- 636/637: Performed by: FAMILY MEDICINE

## 2020-03-11 PROCEDURE — 74011250637 HC RX REV CODE- 250/637: Performed by: HOSPITALIST

## 2020-03-11 PROCEDURE — 74011250637 HC RX REV CODE- 250/637: Performed by: INTERNAL MEDICINE

## 2020-03-11 PROCEDURE — 74011250637 HC RX REV CODE- 250/637: Performed by: FAMILY MEDICINE

## 2020-03-11 RX ORDER — INSULIN GLARGINE 100 [IU]/ML
25 INJECTION, SOLUTION SUBCUTANEOUS
Status: DISCONTINUED | OUTPATIENT
Start: 2020-03-11 | End: 2020-03-12

## 2020-03-11 RX ORDER — GUAIFENESIN 600 MG/1
600 TABLET, EXTENDED RELEASE ORAL EVERY 12 HOURS
Status: DISCONTINUED | OUTPATIENT
Start: 2020-03-11 | End: 2020-03-16 | Stop reason: HOSPADM

## 2020-03-11 RX ORDER — INSULIN LISPRO 100 [IU]/ML
4 INJECTION, SOLUTION INTRAVENOUS; SUBCUTANEOUS
Status: DISCONTINUED | OUTPATIENT
Start: 2020-03-11 | End: 2020-03-12

## 2020-03-11 RX ADMIN — CLOPIDOGREL BISULFATE 75 MG: 75 TABLET ORAL at 08:23

## 2020-03-11 RX ADMIN — GABAPENTIN 100 MG: 100 CAPSULE ORAL at 20:31

## 2020-03-11 RX ADMIN — ROSUVASTATIN CALCIUM 20 MG: 20 TABLET, COATED ORAL at 20:30

## 2020-03-11 RX ADMIN — Medication 10 ML: at 20:31

## 2020-03-11 RX ADMIN — METOPROLOL TARTRATE 12.5 MG: 25 TABLET, FILM COATED ORAL at 20:31

## 2020-03-11 RX ADMIN — INSULIN LISPRO 6 UNITS: 100 INJECTION, SOLUTION INTRAVENOUS; SUBCUTANEOUS at 21:59

## 2020-03-11 RX ADMIN — INSULIN GLARGINE 25 UNITS: 100 INJECTION, SOLUTION SUBCUTANEOUS at 21:38

## 2020-03-11 RX ADMIN — HEPARIN SODIUM 5000 UNITS: 5000 INJECTION INTRAVENOUS; SUBCUTANEOUS at 05:03

## 2020-03-11 RX ADMIN — INSULIN LISPRO 8 UNITS: 100 INJECTION, SOLUTION INTRAVENOUS; SUBCUTANEOUS at 17:10

## 2020-03-11 RX ADMIN — INSULIN LISPRO 4 UNITS: 100 INJECTION, SOLUTION INTRAVENOUS; SUBCUTANEOUS at 11:51

## 2020-03-11 RX ADMIN — METOPROLOL TARTRATE 12.5 MG: 25 TABLET, FILM COATED ORAL at 08:23

## 2020-03-11 RX ADMIN — Medication 1 AMPULE: at 20:30

## 2020-03-11 RX ADMIN — GABAPENTIN 100 MG: 100 CAPSULE ORAL at 17:11

## 2020-03-11 RX ADMIN — LOSARTAN POTASSIUM 100 MG: 50 TABLET, FILM COATED ORAL at 08:24

## 2020-03-11 RX ADMIN — Medication 1 AMPULE: at 08:23

## 2020-03-11 RX ADMIN — GABAPENTIN 100 MG: 100 CAPSULE ORAL at 08:23

## 2020-03-11 RX ADMIN — HEPARIN SODIUM 5000 UNITS: 5000 INJECTION INTRAVENOUS; SUBCUTANEOUS at 17:11

## 2020-03-11 RX ADMIN — INSULIN LISPRO 4 UNITS: 100 INJECTION, SOLUTION INTRAVENOUS; SUBCUTANEOUS at 17:10

## 2020-03-11 RX ADMIN — Medication 10 ML: at 14:04

## 2020-03-11 RX ADMIN — GUAIFENESIN 600 MG: 600 TABLET ORAL at 20:31

## 2020-03-11 RX ADMIN — INSULIN LISPRO 2 UNITS: 100 INJECTION, SOLUTION INTRAVENOUS; SUBCUTANEOUS at 11:52

## 2020-03-11 RX ADMIN — Medication 10 ML: at 05:03

## 2020-03-11 RX ADMIN — GUAIFENESIN 600 MG: 600 TABLET ORAL at 14:03

## 2020-03-11 RX ADMIN — SENNOSIDES AND DOCUSATE SODIUM 1 TABLET: 8.6; 5 TABLET ORAL at 08:23

## 2020-03-11 NOTE — PROGRESS NOTES
Problem: Mobility Impaired (Adult and Pediatric) Goal: *Acute Goals and Plan of Care (Insert Text) Description LTG: 
(1.)Ms. Jeffry Shepard will move from supine to sit and sit to supine , scoot up and down and roll side to side in bed with MODIFIED INDEPENDENCE within 7 treatment day(s). (2.)Ms. Jeffry Shepard will transfer from bed to chair and chair to bed with MODIFIED INDEPENDENCE using the least restrictive device within 7 treatment day(s). (3.)Ms. Jeffry Shepard will ambulate with MODIFIED INDEPENDENCE for 90+ feet with the least restrictive device within 7 treatment day(s). (4.)Ms. Jeffry Shepard will perform standing static and dynamic balance activities x 15 minutes with SUPERVISION to improve safety within 7 treatment day(s). ________________________________________________________________________________________________ Outcome: Progressing Towards Goal 
  
PHYSICAL THERAPY: Daily Note and AM 3/11/2020 INPATIENT: PT Visit Days : 3 Payor: Mark Barraza / Plan: [address] / Product Type: [facility] Care Medicare /   
  
NAME/AGE/GENDER: Shereen Garcia is a 78 y.o. female PRIMARY DIAGNOSIS: Diabetic ketoacidosis ([facility] [address] [E11.10] Metabolic acidosis [E01.9] Dehydration [E86.0] Noncompliance [Z91.19] Hypothermia [T68. XXXA] Diabetic ketoacidosis ([address] Diabetic ketoacidosis ([facility] [address] ICD-10: Treatment Diagnosis:  
 · Generalized Muscle Weakness (M62.81) · Difficulty in walking, Not elsewhere classified (R26.2) Precaution/Allergies: 
Sulfa (sulfonamide antibiotics) ASSESSMENT:  
 
Ms. Jeffry Shepard is a 78 y.o. female in the hospital for the above. Patient was sitting up in recliner chair upon contact and agreeable to PT. Patient transfers to standing with CGA and cues for hand placement/technique. Once standing patient ambulated 100' with rolling walker, CGA and cues for posture/sequencing.  Patient then participates in therapeutic strengthening exercises to improve functional strength for transfers, gait and overall mobility. Patient requires cues to perform exercises correctly. Patient then ambulates an additional 100' before returning to recliner chair where she was positioned for comfort. Overall slow progress towards physical therapy goals. Patient's goals listed above are still appropriate. Will continue skilled PT to address remaining deficits. This section established at most recent assessment PROBLEM LIST (Impairments causing functional limitations): 1. Decreased Strength 2. Decreased ADL/Functional Activities 3. Decreased Transfer Abilities 4. Decreased Ambulation Ability/Technique 5. Decreased Balance 6. Decreased Activity Tolerance INTERVENTIONS PLANNED: (Benefits and precautions of physical therapy have been discussed with the patient.) 1. Balance Exercise 2. Bed Mobility 3. Family Education 4. Gait Training 5. Home Exercise Program (HEP) 6. Therapeutic Activites 7. Therapeutic Exercise/Strengthening 8. Transfer Training TREATMENT PLAN: Frequency/Duration: 3 times a week for duration of hospital stay Rehabilitation Potential For Stated Goals: Good REHAB RECOMMENDATIONS (at time of discharge pending progress):   
Placement: It is my opinion, based on this patient's performance to date, that Ms. Jeanie Smith may benefit from participating in 1-2 additional therapy sessions in order to continue to assess for rehab potential and then make recommendation for disposition at discharge. Equipment:  
? None at this time HISTORY:  
History of Present Injury/Illness (Reason for Referral): 
dehydration Past Medical History/Comorbidities: Ms. Jeanie Smith  has a past medical history of Acute cystitis without hematuria (1/30/2019), CAD (coronary artery disease), DM2 (diabetes mellitus, type 2) ([facility] [address] Gout, HLD (hyperlipidemia), HTN (hypertension), and Peripheral neuropathy. Ms. Guillermo Vaughan  has a past surgical history that includes hx tubal ligation; hx coronary artery bypass graft; pr cardiac surg procedure unlist; and hx cholecystectomy. Social History/Living Environment:  
Home Environment: Apartment # Steps to Enter: 4 Rails to Enter: Yes One/Two Story Residence: One story Living Alone: Yes Support Systems: Child(marga) Patient Expects to be Discharged to[de-identified] Rehabilitation facility Current DME Used/Available at Home: Karina Ferries, straight, Shower chair, Walker, rollator Tub or Shower Type: Tub/Shower combination Prior Level of Function/Work/Activity: 
Was living in mobile home with one of her daughters. PTA ambulating with rollator or SPC. Number of Personal Factors/Comorbidities that affect the Plan of Care: 1-2: MODERATE COMPLEXITY EXAMINATION:  
Most Recent Physical Functioning:  
Gross Assessment: 
  
         
  
Posture: 
  
Balance: 
Sitting: Intact Standing: Impaired; With support Standing - Static: Fair Standing - Dynamic : Fair Bed Mobility: 
  
Wheelchair Mobility: 
  
Transfers: 
Sit to Stand: Contact guard assistance Stand to Sit: Contact guard assistance Gait: 
  
Speed/Noemi: Slow Step Length: Left shortened;Right shortened Gait Abnormalities: Decreased step clearance;Trunk sway increased; Path deviations; Shuffling gait Distance (ft): (100' x 2) Assistive Device: Walker, rolling;Gait belt Ambulation - Level of Assistance: Contact guard assistance Interventions: Safety awareness training; Tactile cues; Verbal cues Body Structures Involved: 1. Metabolic 2. Endocrine 3. Muscles Body Functions Affected: 1. Neuromusculoskeletal 
2. Movement Related 3. Metobolic/Endocrine Activities and Participation Affected: 1. General Tasks and Demands 2. Mobility 3. Domestic Life 4. Community, Social and [place] Number of elements that affect the Plan of Care: 4+: HIGH COMPLEXITY CLINICAL PRESENTATION:  
 Presentation: Stable and uncomplicated: LOW COMPLEXITY CLINICAL DECISION [address] [place] [address] AM-PAC 6 Clicks Basic Mobility Inpatient Short Form How much difficulty does the patient currently have. .. Unable A Lot A Little None 1. Turning over in bed (including adjusting bedclothes, sheets and blankets)? [] 1   [] 2   [] 3   [x] 4  
2. Sitting down on and standing up from a chair with arms ( e.g., wheelchair, bedside commode, etc.)   [] 1   [] 2   [x] 3   [] 4  
3. Moving from lying on back to sitting on the side of the bed? [] 1   [] 2   [] 3   [x] 4 How much help from another person does the patient currently need. .. Total A Lot A Little None 4. Moving to and from a bed to a chair (including a wheelchair)? [] 1   [] 2   [x] 3   [] 4  
5. Need to walk in hospital room? [] 1   [] 2   [] 3   [x] 4  
6. Climbing 3-5 steps with a railing? [] 1   [] 2   [x] 3   [] 4  
© , Trustees of [address] [place] [address], under license to [employer]. All rights reserved Score:  Initial: 21 Most Recent: X (Date: -- ) Interpretation of Tool:  Represents activities that are increasingly more difficult (i.e. Bed mobility, Transfers, Gait). Medical Necessity:    
· Patient demonstrates · good ·  rehab potential due to higher previous functional level. Reason for Services/Other Comments: 
· Patient continues to require skilled intervention due to · Decreased balance and functional mobility · . Use of outcome tool(s) and clinical judgement create a POC that gives a: Clear prediction of patient's progress: LOW COMPLEXITY  
  
 
 
 
TREATMENT:  
(In addition to Assessment/Re-Assessment sessions the following treatments were rendered) Pre-treatment Symptoms/Complaints: none Pain: Initial:  
Pain Intensity 1: 0 0 Post Session:  0 Therapeutic Activity: (    13 Minutes):   Therapeutic activities including transfer training, ambulation on level ground, static/dynamic standing balance, scooting, posture training, instruction in sequencing with rolling walker, and patient education to improve mobility, strength and balance. Required moderate Safety awareness training; Tactile cues; Verbal cues to promote static and dynamic balance in standing and promote coordination of bilateral, lower extremity(s). Therapeutic Exercise: (  10 Minutes):  Exercises per grid below to improve mobility, strength and balance. Required moderate visual and verbal cues to promote proper body alignment, promote proper body posture and promote proper body mechanics. Progressed range, repetitions and complexity of movement as indicated. Date: 
3/11/20 Date: 
 Date: 
  
ACTIVITY/EXERCISE AM PM AM PM AM PM  
Ambulation:           Distance Device Duration Seated Heel Raises 2x15B A Seated Toe Raises 2x15B A Seated Long Arc Quads 2x15B A Seated Marching 2x15B A Seated Hip Abduction B = bilateral; AA = active assistive; A = active; P = passive Braces/Orthotics/Lines/Etc:  
· None Treatment/Session Assessment:   
· Response to Treatment: See above · Interdisciplinary Collaboration:  
o Physical Therapy Assistant 
o Registered Nurse · After treatment position/precautions:  
o Up in chair 
o Bed alarm/tab alert on 
o Bed/Chair-wheels locked 
o Call light within reach 
o RN notified · Compliance with Program/Exercises: Will assess as treatment progresses · Recommendations/Intent for next treatment session: \"Next visit will focus on advancements to more challenging activities and reduction in assistance provided\". Total Treatment Duration: PT Patient Time In/Time Out Time In: 1120 Time Out: 1143 Sofya Huntley, ARVIND

## 2020-03-11 NOTE — PROGRESS NOTES
Progress Note Patient: Darius Montez MRN: [id number]  SSN: [id number] YOB: 1940  Age: 78 y.o. Sex: female Admit Date: 3/8/2020 LOS: 3 days Subjective:  
F/U HHS 
 
77 yo hx of CAD, HTN, DM type 2, HLD, peripheral neuropathy, CKD stage III, ESBL UTI who was admitted on 3/8/20 for HHS. Glucose 545 on arrival, anion gap 20, bicarb 15. No ketones in urine. Originally placed in ICU and then quickly moved to regular floor. Admits to not using her insulin at home. A1C 12.7. Glucose 61 this AM. Glucose elevated at lunch and dinner. No chest pain or SOB. Looking into nursing homes. Current Facility-Administered Medications Medication Dose Route Frequency  insulin glargine (LANTUS) injection 25 Units  25 Units SubCUTAneous QHS  insulin lispro (HUMALOG) injection 4 Units  4 Units SubCUTAneous TIDAC  guaiFENesin ER (MUCINEX) tablet 600 mg  600 mg Oral Q12H  lip protectant (BLISTEX) ointment 1 Each  1 Each Topical PRN  
 alcohol 62% (NOZIN) nasal  1 Ampule  1 Ampule Topical Q12H  
 benzonatate (TESSALON) capsule 100 mg  100 mg Oral TID PRN  
 clopidogreL (PLAVIX) tablet 75 mg  75 mg Oral DAILY  gabapentin (NEURONTIN) capsule 100 mg  100 mg Oral TID  rOPINIRole (REQUIP) tablet 0.5 mg  0.5 mg Oral QHS PRN  
 rosuvastatin (CRESTOR) tablet 20 mg  20 mg Oral QHS  losartan (COZAAR) tablet 100 mg  100 mg Oral DAILY  sodium chloride (NS) flush 5-40 mL  5-40 mL IntraVENous Q8H  
 sodium chloride (NS) flush 5-40 mL  5-40 mL IntraVENous PRN  
 acetaminophen (TYLENOL) tablet 650 mg  650 mg Oral Q4H PRN  
 HYDROcodone-acetaminophen (NORCO) 5-325 mg per tablet 1 Tab  1 Tab Oral Q6H PRN  
 naloxone (NARCAN) injection 0.4 mg  0.4 mg IntraVENous PRN  
 senna-docusate (PERICOLACE) 8.6-50 mg per tablet 1 Tab  1 Tab Oral DAILY  heparin (porcine) injection 5,000 Units  5,000 Units SubCUTAneous Q12H  metoprolol tartrate (LOPRESSOR) tablet 12.5 mg  12.5 mg Oral Q12H  
 methocarbamoL (ROBAXIN) tablet 500 mg  500 mg Oral TID PRN  
 insulin lispro (HUMALOG) injection   SubCUTAneous AC&HS Objective:  
 
Vitals:  
 03/10/20 2312 03/11/20 0300 03/11/20 0751 03/11/20 1136 BP: 156/80 142/77 137/79 196/81 Pulse: 66 72 63 (!) 58 Resp: 18 18 18 18 Temp: 99 °F (37.2 °C) 98.9 °F (37.2 °C) 97.6 °F (36.4 °C) 97.5 °F (36.4 °C) SpO2: 97% 97% 91% 99% Weight:      
Height:      
  
  
Intake and Output: 
Current Shift: No intake/output data recorded. Last three shifts: 03/09 1901 - 03/11 0700 In: -  
Out: [address] [[id number]] Physical Exam:  
General:  Alert, cooperative, no distress, appears stated age. Eating lunch Eyes:  Conjunctivae/corneas clear. Ears:  Normal TMs and external ear canals both ears. Nose: Nares normal. Septum midline. Mouth/Throat: Lips, mucosa, and tongue normal.   
Neck:  no JVD. Back:   deferred Lungs:   Clear to auscultation bilaterally. Heart:  Regular rate and rhythm, S1, S2 normal  
Abdomen:   Soft, non-tender. Bowel sounds normal. No masses,  No organomegaly. Extremities: 1+ edema to LE bilaterally Pulses: 2+ and symmetric all extremities. Skin: Skin color, texture, turgor normal. No rashes or lesions Lymph nodes: Cervical, supraclavicular, and axillary nodes normal.  
Neurologic: CNII-XII intact. Limited ROM in chair. Lab/Data Review: 
 
Recent Results (from the past 24 hour(s)) GLUCOSE, POC Collection Time: 03/10/20  3:11 PM  
Result Value Ref Range Glucose (POC) 187 (H) 65 - 100 mg/dL PLEASE READ & DOCUMENT PPD TEST IN 48 HRS Collection Time: 03/10/20  6:08 PM  
Result Value Ref Range PPD Negative Negative  
 mm Induration 0 0 - 5 mm GLUCOSE, POC Collection Time: 03/10/20  8:18 PM  
Result Value Ref Range Glucose (POC) 263 (H) 65 - 100 mg/dL GLUCOSE, POC Collection Time: 03/11/20  7:47 AM  
Result Value Ref Range Glucose (POC) 64 (L) 65 - 100 mg/dL GLUCOSE, POC Collection Time: 03/11/20  9:15 AM  
Result Value Ref Range Glucose (POC) 148 (H) 65 - 100 mg/dL GLUCOSE, POC Collection Time: 03/11/20 11:33 AM  
Result Value Ref Range Glucose (POC) 197 (H) 65 - 100 mg/dL Assessment/ Plan:  
 
Principal Problem: 
  Diabetes mellitus with hyperosmolarity without hyperglycemic hyperosmolar nonketotic coma ([address] (11/24/2014) Active Problems: 
  Coronary artery disease involving coronary bypass graft of native heart without angina pectoris (6/15/2015) Uncontrolled type 2 diabetes mellitus with hyperglycemia ([address] (6/15/2015) Essential hypertension (6/15/2015) HLD (hyperlipidemia) (6/15/2015) Chronic kidney disease, stage III (moderate) (HCC) (6/15/2015) Gastroesophageal reflux disease without esophagitis (6/15/2015) Peripheral neuropathy (6/15/2015) Insomnia (6/15/2015) RLS (restless legs syndrome) (6/15/2015) Generalized weakness (9/16/2019) Dehydration (3/8/2020) Hypothermia (3/8/2020) Metabolic acidosis (0/8/2054) Noncompliance (3/8/2020) Hypoalbuminemia due to protein-calorie malnutrition ([address] (3/8/2020) HHS - secondary to noncompliance. Decrease Lantus to 25 units since AM glucose low. SS. Add scheduled 4 units Humalog before meals. DM management following. CKD - stable HTN- Losartan 100mg daily. BB. HLD - statin CAD- Plavix DVT prophylaxis - heparin Dc cardiac monitor. Look to dc once have nursing home placement. Signed By: Susana Richmond DO March 11, 2020

## 2020-03-11 NOTE — PROGRESS NOTES
[facility] OUTREACH NURSE PROGRESS REPORT SUBJECTIVE: Called to assess patient secondary to outreach protocol. MEWS Score: 1 (03/10/20 1936) Vitals:  
 03/10/20 1132 03/10/20 1507 03/10/20 1936 03/10/20 2312 BP: 147/80 159/74 142/72 156/80 Pulse: 65 68 72 66 Resp: 18 18 18 18 Temp: 98.3 °F (36.8 °C) 98.8 °F (37.1 °C) 98.2 °F (36.8 °C) 99 °F (37.2 °C) SpO2: 94% 93% 93% 97% Weight:      
Height:      
  
 
LAB DATA: 
 
Recent Labs  
  03/10/20 
0645 03/09/20 0343 03/08/20 
2043 03/08/20 
1638 03/08/20 
1358  142 143 140 135* K 3.8 3.9 3.9 3.8 5.8*  
* 109* 108* 104 100 CO2 28 28 27 31 15* AGAP 7 5* 8 5* 20* GLU 61* 157* 105* 391* 545* BUN 18 22 21 21 19 CREA 1.22* 1.35* 1.31* 1.53* 1.63* GFRAA 54* 48* 50* 42* 39* GFRNA 45* 40* 41* 35* 32*  
CA 8.8 8.2* 8.6 8.3 8.8 MG  --   --  2.4 2.4  --   
PHOS  --   --   --  1.8*  --   
ALB  --   --   --   --  1.0*  
TP  --   --   --   --  7.8 GLOB  --   --   --   --  6.8* AGRAT  --   --   --   --  0.1* ALT  --   --   --   --  27 Recent Labs  
  03/10/20 
0645 03/09/20 0343 03/08/20 
1358 WBC 8.2 9.6 10.4 HGB 10.6* 10.8* 11.8 HCT 32.8* 33.7* 36.3  250 288 OBJECTIVE: On arrival to room, I found patient to be sleeping in bed. Pain Assessment Pain Intensity 1: 0 (03/10/20 2313) Patient Stated Pain Goal: 0 
 
  
  
  
  
 
  
  
  
   
 
ASSESSMENT:  Pt is sleeping. VSS. Breaths are even and unlabored. PLAN:  Will continue to monitor per protocol.

## 2020-03-11 NOTE — PROGRESS NOTES
Problem: Mobility Impaired (Adult and Pediatric) Goal: *Acute Goals and Plan of Care (Insert Text) Description LTG: 
(1.)Ms. Jeffry Shepard will move from supine to sit and sit to supine , scoot up and down and roll side to side in bed with MODIFIED INDEPENDENCE within 7 treatment day(s). (2.)Ms. Jeffry Shepard will transfer from bed to chair and chair to bed with MODIFIED INDEPENDENCE using the least restrictive device within 7 treatment day(s). (3.)Ms. Jeffry Shepard will ambulate with MODIFIED INDEPENDENCE for 90+ feet with the least restrictive device within 7 treatment day(s). (4.)Ms. Jeffry Shepard will perform standing static and dynamic balance activities x 15 minutes with SUPERVISION to improve safety within 7 treatment day(s). ________________________________________________________________________________________________ Outcome: Progressing Towards Goal 
  
PHYSICAL THERAPY: Daily Note and PM 3/11/2020 INPATIENT: PT Visit Days : 3 Payor: Mark Barraza / Plan: [address] / Product Type: [facility] Care Medicare /   
  
NAME/AGE/GENDER: Shereen Garcia is a 78 y.o. female PRIMARY DIAGNOSIS: Diabetic ketoacidosis ([facility] [E11.10] Metabolic acidosis [C95.2] Dehydration [E86.0] Noncompliance [Z91.19] Hypothermia [T68. XXXA] Diabetes mellitus with hyperosmolarity without hyperglycemic hyperosmolar nonketotic coma ([facility] Diabetes mellitus with hyperosmolarity without hyperglycemic hyperosmolar nonketotic coma ([facility] ICD-10: Treatment Diagnosis:  
 · Generalized Muscle Weakness (M62.81) · Difficulty in walking, Not elsewhere classified (R26.2) Precaution/Allergies: 
Sulfa (sulfonamide antibiotics) ASSESSMENT:  
 
Ms. Jeffry Shepard is a 78 y.o. female in the hospital for the above who was sitting in the chair upon arrival. She denies pain at this time. Ms. Jeffry Shepard presents to PT with [facility] AROM and decreased strength in B LEs.   Pt performed bed mobility with SBA and intact sitting balance. She stood today with Bonita and RW demonstrating fair standing balance. Pt ambulated in room and around ortega with CGA/RW 210ft. Pt required cuing for forward flexed posture and management of RW. Multiple standing breaks required. She presented with decreased gait speed and step clearance today. Ms. Cristino Alvarado could benefit from skilled PT as she is currently functioning below her baseline. She is progressing towards goals. This section established at most recent assessment PROBLEM LIST (Impairments causing functional limitations): 1. Decreased Strength 2. Decreased ADL/Functional Activities 3. Decreased Transfer Abilities 4. Decreased Ambulation Ability/Technique 5. Decreased Balance 6. Decreased Activity Tolerance INTERVENTIONS PLANNED: (Benefits and precautions of physical therapy have been discussed with the patient.) 1. Balance Exercise 2. Bed Mobility 3. Family Education 4. Gait Training 5. Home Exercise Program (HEP) 6. Therapeutic Activites 7. Therapeutic Exercise/Strengthening 8. Transfer Training TREATMENT PLAN: Frequency/Duration: 3 times a week for duration of hospital stay Rehabilitation Potential For Stated Goals: Good REHAB RECOMMENDATIONS (at time of discharge pending progress):   
Placement: It is my opinion, based on this patient's performance to date, that Ms. Cristino Alvarado may benefit from participating in 1-2 additional therapy sessions in order to continue to assess for rehab potential and then make recommendation for disposition at discharge. Equipment:  
? None at this time HISTORY:  
History of Present Injury/Illness (Reason for Referral): 
dehydration Past Medical History/Comorbidities: Ms. Cristino Alvarado  has a past medical history of Acute cystitis without hematuria (1/30/2019), CAD (coronary artery disease), DM2 (diabetes mellitus, type 2) ([facility] Gout, HLD (hyperlipidemia), HTN (hypertension), and Peripheral neuropathy. Ms. Elmer Lantigua  has a past surgical history that includes hx tubal ligation; hx coronary artery bypass graft; pr cardiac surg procedure unlist; and hx cholecystectomy. Social History/Living Environment:  
Home Environment: Apartment # Steps to Enter: 4 Rails to Enter: Yes One/Two Story Residence: One story Living Alone: Yes Support Systems: Child(marga) Patient Expects to be Discharged to[de-identified] Rehabilitation facility Current DME Used/Available at Home: [employer] Coins, straight, Shower chair, Walker, rollator Tub or Shower Type: Tub/Shower combination Prior Level of Function/Work/Activity: 
Was living in mobile home with one of her daughters. PTA ambulating with rollator or SPC. Number of Personal Factors/Comorbidities that affect the Plan of Care: 1-2: MODERATE COMPLEXITY EXAMINATION:  
Most Recent Physical Functioning:  
Gross Assessment: 
  
         
  
Posture: 
  
Balance: 
  Bed Mobility: 
  
Wheelchair Mobility: 
  
Transfers: 
  
Gait: 
  
   
  
Body Structures Involved: 1. Metabolic 2. Endocrine 3. Muscles Body Functions Affected: 1. Neuromusculoskeletal 
2. Movement Related 3. Metobolic/Endocrine Activities and Participation Affected: 1. General Tasks and Demands 2. Mobility 3. Domestic Life 4. Community, Social and [address] [place] [address] Number of elements that affect the Plan of Care: 4+: HIGH COMPLEXITY CLINICAL PRESENTATION:  
Presentation: Stable and uncomplicated: LOW COMPLEXITY CLINICAL DECISION MAKIN [facility] [address] [address] AM-PAC 6 Clicks Basic Mobility Inpatient Short Form How much difficulty does the patient currently have. .. Unable A Lot A Little None 1. Turning over in bed (including adjusting bedclothes, sheets and blankets)? [] 1   [] 2   [] 3   [x] 4  
2.   Sitting down on and standing up from a chair with arms ( e.g., wheelchair, bedside commode, etc.)   [] 1   [] 2   [x] 3   [] 4  
 3. Moving from lying on back to sitting on the side of the bed? [] 1   [] 2   [] 3   [x] 4 How much help from another person does the patient currently need. .. Total A Lot A Little None 4. Moving to and from a bed to a chair (including a wheelchair)? [] 1   [] 2   [x] 3   [] 4  
5. Need to walk in hospital room? [] 1   [] 2   [] 3   [x] 4  
6. Climbing 3-5 steps with a railing? [] 1   [] 2   [x] 3   [] 4  
© 2007, Trustees of [address] [place] [address] [address] [address], under license to [employer] (formerly [employer]). All rights reserved Score:  Initial: 21 Most Recent: X (Date: -- ) Interpretation of Tool:  Represents activities that are increasingly more difficult (i.e. Bed mobility, Transfers, Gait). Medical Necessity:    
· Patient demonstrates · good ·  rehab potential due to higher previous functional level. Reason for Services/Other Comments: 
· Patient continues to require skilled intervention due to · Decreased balance and functional mobility · . Use of outcome tool(s) and clinical judgement create a POC that gives a: Clear prediction of patient's progress: LOW COMPLEXITY  
  
 
 
 
TREATMENT:  
(In addition to Assessment/Re-Assessment sessions the following treatments were rendered) Pre-treatment Symptoms/Complaints: the back of my legs feel tight Pain: Initial:  
  0 Post Session:  0 Gait Training ( 9 minutes):  Gait training to improve and/or restore physical functioning as related to mobility, strength, and balance. Ambulated (100' x 2) with Contact guard assistance using a Walker, rolling;Gait belt and minimal Safety awareness training; Tactile cues; Verbal cues related to their management of RW and standing posture. Braces/Orthotics/Lines/Etc:  
· IV 
· coyle catheter · O2 Device: Room air Treatment/Session Assessment:   
· Response to Treatment:  Tolerated well without complaint. Noted tightness in posterior legs which improved slightly after ambulation. · Interdisciplinary Collaboration: o Physical Therapist 
o Registered Nurse · After treatment position/precautions:  
o Up in chair 
o Bed/Chair-wheels locked 
o Bed in low position 
o Nurse at bedside · Compliance with Program/Exercises: Will assess as treatment progresses · Recommendations/Intent for next treatment session: \"Next visit will focus on advancements to more challenging activities and reduction in assistance provided\". Total Treatment Duration: PT Patient Time In/Time Out Time In: 1579 Time Out: 9524 Nolon Figures, PT, DPT

## 2020-03-11 NOTE — PROGRESS NOTES
Date of Outreach Update: 
Katharine Christiansen was seen and assessed. Pt awake, alert, oriented x 4, no acute concerns noted at this time. Insulin adjusted due to hypoglycemia the past two mornings. O2 sats 96%, walking in the halls with therapy. MEWS Score: 1 (03/11/20 1136) Vitals:  
 03/10/20 2312 03/11/20 0300 03/11/20 0751 03/11/20 1136 BP: 156/80 142/77 137/79 196/81 Pulse: 66 72 63 (!) 58 Resp: 18 18 18 18 Temp: 99 °F (37.2 °C) 98.9 °F (37.2 °C) 97.6 °F (36.4 °C) 97.5 °F (36.4 °C) SpO2: 97% 97% 91% 99% Weight:      
Height:      
  
 
 Pain Assessment Pain Intensity 1: 0 (03/11/20 1120) Patient Stated Pain Goal: 0 Previous Outreach assessment has been reviewed. There have been no significant clinical changes since the completion of the last dated Outreach assessment. Will discharge from outreach program. Please contact ICU outreach nurse with any additional concerns.  
 
Signed By:   Aida Coburn RN 
  March 11, 2020 1:52 PM

## 2020-03-11 NOTE — PROGRESS NOTES
CM was notified by staff, patient's daughter Trini Rivera [phone] requested to speak with CM. Patient's daughter assisted the pt with SNF choices. Patient's daughter Trini Rivera requested referrals to be sent to [place] [address], and [facility]. CM sent referral. Awaiting response. CM continues to follow the patient.

## 2020-03-11 NOTE — PROGRESS NOTES
Checked PPD. PPD is negative. However unable to document in kardex.  48 hr PPD has already been documented on by [employer]

## 2020-03-11 NOTE — DIABETES MGMT
Patient seen for diabetes education. Patient last seen by diabetes management team in September of 2019. Patient in recliner, daughter at bedside. Reviewed patient glycemic control and significance of HbA1c 12.7%. Patient admits she has not been checking her blood glucose level stating \"I moved and I don't know where my meter is. \" Pt states she is legally blind in the left eye and states her right eye has decreased vision. Patient states her aide checks her blood glucose in the morning. Per patient her aide comes every day around [id number] and helps her check her glucose and dials up her nightly insulin pen dose. Discussed that perhaps patient could try checking her glucose with a standing magnifying glass and talking meter. Patient states \"I didn't know they had those. \" Patient will need prescription for talking glucometer and glucometer supplies at discharge so she may obtain the meter covered by her insurance. Provider updated. Also educated patient on affordable talking glucometer options if she was to ever be uninsured. Educated patient regarding plate method and the effects of sweetened beverages on glycemic control as dietary compliance has been a barrier to patient in the past. Also discussed ways to overcome barriers of skipping medication doses. For example, discussed patient may benefit from taking Lantus at 1100 everyday so her aide is there to help her which was discussed with provider. Pt prefers insulin pens. Patient states she never picked up her Novolog prescription from the pharmacy which was ordered with meals. Patient typically eats lunch and supper. Educated patient regarding current basal/bolus regimen of Lantus 25 units nightly and Humalog 4 units with meals and SSI including type of insulin, timing with meals, onset, duration of effect, and peak of insulin dose.  Educated patient on the differences between prandial insulin and sliding scale insulin. Instructed patient to always seek guidance from their primary care provider about adjusting their insulin doses and not to adjust them on their own as this could negatively impact their glycemic control or result in hypoglycemia. Patient verbalizes understanding. Encouraged compliance with discharge regimen. Encouraged patient to continue to work on lifestyle modifications, over coming barriers, and to follow up with primary care provider for further titration of regimen. Patient may discharge to a facility. Discussed the complications of controlled diabetes. Patient verbalizes understanding and voices no further questions regarding diabetes management at this time.

## 2020-03-11 NOTE — DIABETES MGMT
Noted patient blood glucose 64 mg/dL at 0747. No recent FSBS in Epic called floor spoke with  requested recheck blood glucose if not already done to evaluate current glycemic control.

## 2020-03-11 NOTE — PROGRESS NOTES
Hourly rounds performed;all pt needs met. Pt alert and oriented this shift; VSS. Voiding well. Bed in low, locked position and call light/ personal items within reach. Will continue to monitor and give bedside shift report to oncoming day shift nurse.

## 2020-03-11 NOTE — DIABETES MGMT
Patient admitted with DKA, history of noncompliance. Admitting blood glucose 545. Blood glucose ranged  yesterday with patient receiving Lantus 35 units and Humalog 16 units. Blood glucose this morning was 64. Spoke with provider, discussed patient glycemic control. New orders received to decrease Lantus to 25 units to reduce patient risk of hypoglycemia. New orders also received to initiate Humalog 4 units with meals as patient glucose levels remain elevated during the day.

## 2020-03-11 NOTE — PROGRESS NOTES
Date of Outreach Update: 
Jose Mann was seen and assessed. Pt is axox4. VSS Previous Outreach assessment has been reviewed. There have been no significant clinical changes since the completion of the last dated Outreach assessment. Will continue to follow up per outreach protocol. Signed By:   Nimo Perez   March 11, 2020 5:12 AM

## 2020-03-12 ENCOUNTER — PATIENT OUTREACH (OUTPATIENT)
Dept: CASE MANAGEMENT | Age: 80
End: 2020-03-12

## 2020-03-12 LAB
GLUCOSE BLD STRIP.AUTO-MCNC: 105 MG/DL (ref 65–100)
GLUCOSE BLD STRIP.AUTO-MCNC: 208 MG/DL (ref 65–100)
GLUCOSE BLD STRIP.AUTO-MCNC: 252 MG/DL (ref 65–100)
GLUCOSE BLD STRIP.AUTO-MCNC: 286 MG/DL (ref 65–100)
MM INDURATION POC: 0 MM (ref 0–5)
PPD POC: NEGATIVE NEGATIVE

## 2020-03-12 PROCEDURE — 65270000029 HC RM PRIVATE

## 2020-03-12 PROCEDURE — 97530 THERAPEUTIC ACTIVITIES: CPT

## 2020-03-12 PROCEDURE — 74011250637 HC RX REV CODE- 250/637: Performed by: HOSPITALIST

## 2020-03-12 PROCEDURE — 74011250637 HC RX REV CODE- 250/637: Performed by: FAMILY MEDICINE

## 2020-03-12 PROCEDURE — 74011250636 HC RX REV CODE- 250/636: Performed by: HOSPITALIST

## 2020-03-12 PROCEDURE — 74011636637 HC RX REV CODE- 636/637: Performed by: INTERNAL MEDICINE

## 2020-03-12 PROCEDURE — 74011250637 HC RX REV CODE- 250/637: Performed by: INTERNAL MEDICINE

## 2020-03-12 PROCEDURE — 74011636637 HC RX REV CODE- 636/637: Performed by: FAMILY MEDICINE

## 2020-03-12 PROCEDURE — 82962 GLUCOSE BLOOD TEST: CPT

## 2020-03-12 RX ORDER — INSULIN GLARGINE 100 [IU]/ML
22 INJECTION, SOLUTION SUBCUTANEOUS
Status: DISCONTINUED | OUTPATIENT
Start: 2020-03-12 | End: 2020-03-13

## 2020-03-12 RX ORDER — INSULIN LISPRO 100 [IU]/ML
8 INJECTION, SOLUTION INTRAVENOUS; SUBCUTANEOUS
Status: DISCONTINUED | OUTPATIENT
Start: 2020-03-12 | End: 2020-03-13

## 2020-03-12 RX ADMIN — GABAPENTIN 100 MG: 100 CAPSULE ORAL at 21:17

## 2020-03-12 RX ADMIN — CLOPIDOGREL BISULFATE 75 MG: 75 TABLET ORAL at 08:30

## 2020-03-12 RX ADMIN — INSULIN LISPRO 8 UNITS: 100 INJECTION, SOLUTION INTRAVENOUS; SUBCUTANEOUS at 16:31

## 2020-03-12 RX ADMIN — Medication 10 ML: at 05:18

## 2020-03-12 RX ADMIN — METOPROLOL TARTRATE 12.5 MG: 25 TABLET, FILM COATED ORAL at 08:30

## 2020-03-12 RX ADMIN — HEPARIN SODIUM 5000 UNITS: 5000 INJECTION INTRAVENOUS; SUBCUTANEOUS at 18:16

## 2020-03-12 RX ADMIN — INSULIN LISPRO 4 UNITS: 100 INJECTION, SOLUTION INTRAVENOUS; SUBCUTANEOUS at 07:48

## 2020-03-12 RX ADMIN — Medication 1 AMPULE: at 08:31

## 2020-03-12 RX ADMIN — INSULIN LISPRO 6 UNITS: 100 INJECTION, SOLUTION INTRAVENOUS; SUBCUTANEOUS at 12:16

## 2020-03-12 RX ADMIN — SENNOSIDES AND DOCUSATE SODIUM 1 TABLET: 8.6; 5 TABLET ORAL at 08:30

## 2020-03-12 RX ADMIN — HEPARIN SODIUM 5000 UNITS: 5000 INJECTION INTRAVENOUS; SUBCUTANEOUS at 05:18

## 2020-03-12 RX ADMIN — GUAIFENESIN 600 MG: 600 TABLET ORAL at 08:30

## 2020-03-12 RX ADMIN — Medication 10 ML: at 21:17

## 2020-03-12 RX ADMIN — INSULIN LISPRO 4 UNITS: 100 INJECTION, SOLUTION INTRAVENOUS; SUBCUTANEOUS at 16:31

## 2020-03-12 RX ADMIN — Medication 10 ML: at 14:53

## 2020-03-12 RX ADMIN — GABAPENTIN 100 MG: 100 CAPSULE ORAL at 16:30

## 2020-03-12 RX ADMIN — ROSUVASTATIN CALCIUM 20 MG: 20 TABLET, COATED ORAL at 21:17

## 2020-03-12 RX ADMIN — GABAPENTIN 100 MG: 100 CAPSULE ORAL at 08:30

## 2020-03-12 RX ADMIN — INSULIN LISPRO 6 UNITS: 100 INJECTION, SOLUTION INTRAVENOUS; SUBCUTANEOUS at 21:18

## 2020-03-12 RX ADMIN — INSULIN GLARGINE 22 UNITS: 100 INJECTION, SOLUTION SUBCUTANEOUS at 21:18

## 2020-03-12 RX ADMIN — LOSARTAN POTASSIUM 100 MG: 50 TABLET, FILM COATED ORAL at 08:30

## 2020-03-12 RX ADMIN — METOPROLOL TARTRATE 12.5 MG: 25 TABLET, FILM COATED ORAL at 21:17

## 2020-03-12 RX ADMIN — Medication 1 AMPULE: at 21:17

## 2020-03-12 RX ADMIN — INSULIN LISPRO 8 UNITS: 100 INJECTION, SOLUTION INTRAVENOUS; SUBCUTANEOUS at 12:16

## 2020-03-12 RX ADMIN — GUAIFENESIN 600 MG: 600 TABLET ORAL at 21:17

## 2020-03-12 NOTE — PROGRESS NOTES
CM contacted pt's daughter Quinn Rivera to obtain additional SNF options to assist with STR placement. Pt's daughter informed CM, the patient has completed STR at [facility] in the past and has requested a referral to be sent to this facility. CM sent referral. Awaiting response. ARAMIS continues to follow the patient. ARAMIS attempted to contact SteverAntoine Collier with [facility] to obtain update regarding referral sent. CM was unsuccessful at reaching [facility] CM left a voicemail. CM will contact liaison tomorrow 3/13 to obtain status update. CM continues to follow the patient.

## 2020-03-12 NOTE — PROGRESS NOTES
END OF SHIFT NOTE: 
 
INTAKE/OUTPUT 
03/11 0701 - 03/12 0700 In: 180 [P.O.:180] Out: 1300 [Urine:1300] Voiding: YES Catheter: NO 
Color: clear Drain: DIET 
ADA Flatus: Patient does have flatus present. Stool:  0 occurrences. Characteristics: 
  
 
Ambulating Yes, in room with assist to bathroom. Emesis: 0 occurrences. Characteristics: VITAL SIGNS Patient Vitals for the past 12 hrs: 
 Temp Pulse Resp BP SpO2  
03/12/20 1612 98.6 °F (37 °C) 71 18 135/72 94 % 03/12/20 1154 98.2 °F (36.8 °C) 64 18 154/79 95 % 03/12/20 0715 97.5 °F (36.4 °C) (!) 56 18 126/68 92 % Pain Assessment Pain Intensity 1: 0 (03/12/20 1453) Patient Stated Pain Goal: 0 Hourly rounds completed this shift, will give report to oncoming nurse.  
 
 
Jason Schmitz RN

## 2020-03-12 NOTE — PROGRESS NOTES
Uneventful shift;pt rested well throughout the night. Hourly rounds performed;all pt needs met. Bed in low position and call light/ personal items within reach. Will continue to monitor and give bedside shift report to oncoming day shift nurse.

## 2020-03-12 NOTE — DIABETES MGMT
Patient seen for follow up diabetes education. Blood glucose ranged  yesterday with patient receiving Lantus 25 units and Humalog 24 units. Blood glucose this morning was 105. Spoke with provider discussed patient glycemic control. New orders received to increase patient prandial insulin to Humalog 8 units with meals as patient blood glucose elevates during the day related to caloric intake. New orders also received to decrease basal insulin to Lantus 22 units to help reduce patient risk of hypoglycemia. Patient in bed, no visitors at bedside. Reviewed these numbers and their significance with patient. Educated patient regarding current basal/bolus regimen of Lantus 22 units nightly and Humalog 8 units with meals and SSI including type of insulin, timing with meals, onset, duration of effect, and peak of insulin dose. Educated patient on the differences between prandial insulin and sliding scale insulin. Instructed patient to always seek guidance from their primary care provider about adjusting their insulin doses and not to adjust them on their own as this could negatively impact their glycemic control or result in hypoglycemia. Patient verbalizes understanding. Noted patient ate 100% of breakfast this AM: 1/2 cup milk, 1 banana, 1 box rice krispies, 1 slice toast, 1 4oz orange juice, and webb. Reviewed the basics of a diabetic diet with patient as this has been a barrier to compliance for patient in the past. Asked patient which foods from her breakfast tray are carbs. Patient states the cereal, juice, and banana. Educated patient regarding which foods contain carbs and the effects of carbs on glycemic control. Patient questions Acadia Shubham is a carb? \" Again reviewed that bread does have carbs. Discussed ways to limit carbs and reviewed plate method with patient. Patient states she did try a diet galye mist yesterday, but that she did not like it.  Educated patient regarding alternative beverages options to help improve glycemic control. Patient plans to try Crystal Lite lemonade or fruit punch today. Patient states \"I like chick-fi-la's diet lemonade. \" Educated patient on the total carbs in [facility] lemonade. Patient states \"oh that probably why it tastes good. \" Encouraged patient to continue to make dietary changes. Also discussed another barrier to patient compliance which is checking her FSBS levels. Patient states \"I saw on TV something that goes on your arm. \" Educated patient regarding CGM therapy. Encouraged patient to call insurance company and PCP to discuss the possibilities of getting a CGM. Patient verbalized understanding and voices no further questions regarding diabetes management at this time. Patient states she may discharge to [facility] when available. Will continue to follow along.

## 2020-03-12 NOTE — PROGRESS NOTES
Progress Note Patient: Clayton Vargas MRN: [id number]  SSN: [id number] YOB: 1940  Age: 78 y.o. Sex: female Admit Date: 3/8/2020 LOS: 4 days Subjective:  
F/U HHS 
 
77 yo hx of CAD, HTN, DM type 2, HLD, peripheral neuropathy, CKD stage III, ESBL UTI who was admitted on 3/8/20 for HHS. Glucose 545 on arrival, anion gap 20, bicarb 15. No ketones in urine. Originally placed in ICU and then quickly moved to regular floor. Admits to not using her insulin at home. A1C 12.7. DM consulted. Since insulin regimen changed, her glucose levels have started to improve. Glucose 105 this AM. Glucose elevated at dinner. No chest pain or SOB. Looking into nursing homes. Current Facility-Administered Medications Medication Dose Route Frequency  insulin lispro (HUMALOG) injection 8 Units  8 Units SubCUTAneous TIDAC  insulin glargine (LANTUS) injection 22 Units  22 Units SubCUTAneous QHS  guaiFENesin ER (MUCINEX) tablet 600 mg  600 mg Oral Q12H  lip protectant (BLISTEX) ointment 1 Each  1 Each Topical PRN  
 alcohol 62% (NOZIN) nasal  1 Ampule  1 Ampule Topical Q12H  
 benzonatate (TESSALON) capsule 100 mg  100 mg Oral TID PRN  
 clopidogreL (PLAVIX) tablet 75 mg  75 mg Oral DAILY  gabapentin (NEURONTIN) capsule 100 mg  100 mg Oral TID  rOPINIRole (REQUIP) tablet 0.5 mg  0.5 mg Oral QHS PRN  
 rosuvastatin (CRESTOR) tablet 20 mg  20 mg Oral QHS  losartan (COZAAR) tablet 100 mg  100 mg Oral DAILY  sodium chloride (NS) flush 5-40 mL  5-40 mL IntraVENous Q8H  
 sodium chloride (NS) flush 5-40 mL  5-40 mL IntraVENous PRN  
 acetaminophen (TYLENOL) tablet 650 mg  650 mg Oral Q4H PRN  
 HYDROcodone-acetaminophen (NORCO) 5-325 mg per tablet 1 Tab  1 Tab Oral Q6H PRN  
 naloxone (NARCAN) injection 0.4 mg  0.4 mg IntraVENous PRN  
 senna-docusate (PERICOLACE) 8.6-50 mg per tablet 1 Tab  1 Tab Oral DAILY  heparin (porcine) injection 5,000 Units  5,000 Units SubCUTAneous Q12H  
 metoprolol tartrate (LOPRESSOR) tablet 12.5 mg  12.5 mg Oral Q12H  
 methocarbamoL (ROBAXIN) tablet 500 mg  500 mg Oral TID PRN  
 insulin lispro (HUMALOG) injection   SubCUTAneous AC&HS Objective:  
 
Vitals:  
 03/11/20 1936 03/11/20 2341 03/12/20 0250 03/12/20 0715 BP: 129/67 148/75 120/63 126/68 Pulse: 74 68 60 (!) 56 Resp: 18 18 18 18 Temp: 98.4 °F (36.9 °C) 98.2 °F (36.8 °C) 98.6 °F (37 °C) 97.5 °F (36.4 °C) SpO2: 94% 96% 92% 92% Weight:      
Height:      
  
  
Intake and Output: 
Current Shift: No intake/output data recorded. Last three shifts: 03/10 1901 - 03/12 0700 In: 180 [P.O.:180] Out: 2401 [Urine:2400] Physical Exam:  
General:  Alert, cooperative, no distress, appears stated age. Eyes:  Conjunctivae/corneas clear. Ears:  Normal TMs and external ear canals both ears. Nose: Nares normal. Septum midline. Mouth/Throat: Lips, mucosa, and tongue normal.   
Neck:  no JVD. Back:   deferred Lungs:   Clear to auscultation bilaterally. Heart:  Regular rate and rhythm, S1, S2 normal  
Abdomen:   Soft, non-tender. Bowel sounds normal.   
Extremities: 1+ edema to LE bilaterally Pulses: 2+ and symmetric all extremities. Skin: Skin color, texture, turgor normal. No rashes or lesions Lymph nodes: Cervical, supraclavicular, and axillary nodes normal.  
Neurologic: CNII-XII intact. Limited ROM in chair. Lab/Data Review: 
 
Recent Results (from the past 24 hour(s)) GLUCOSE, POC Collection Time: 03/11/20 11:33 AM  
Result Value Ref Range Glucose (POC) 197 (H) 65 - 100 mg/dL GLUCOSE, POC Collection Time: 03/11/20  4:12 PM  
Result Value Ref Range Glucose (POC) 342 (H) 65 - 100 mg/dL GLUCOSE, POC Collection Time: 03/11/20  8:35 PM  
Result Value Ref Range Glucose (POC) 289 (H) 65 - 100 mg/dL PLEASE READ & DOCUMENT PPD TEST IN 72 HRS  
 Collection Time: 03/12/20  1:01 AM  
Result Value Ref Range PPD Negative Negative  
 mm Induration 0 0 - 5 mm GLUCOSE, POC Collection Time: 03/12/20  7:23 AM  
Result Value Ref Range Glucose (POC) 105 (H) 65 - 100 mg/dL Assessment/ Plan:  
 
Principal Problem: 
  Diabetes mellitus with hyperosmolarity without hyperglycemic hyperosmolar nonketotic coma ([address] (11/24/2014) Active Problems: 
  Coronary artery disease involving coronary bypass graft of native heart without angina pectoris (6/15/2015) Uncontrolled type 2 diabetes mellitus with hyperglycemia ([address] (6/15/2015) Essential hypertension (6/15/2015) HLD (hyperlipidemia) (6/15/2015) Chronic kidney disease, stage III (moderate) (HCC) (6/15/2015) Gastroesophageal reflux disease without esophagitis (6/15/2015) Peripheral neuropathy (6/15/2015) Insomnia (6/15/2015) RLS (restless legs syndrome) (6/15/2015) Generalized weakness (9/16/2019) Dehydration (3/8/2020) Hypothermia (3/8/2020) Metabolic acidosis (8/9/0948) Noncompliance (3/8/2020) Hypoalbuminemia due to protein-calorie malnutrition ([address] (3/8/2020) HHS - secondary to noncompliance. Decrease Lantus to 22 units since AM glucose low. SS. Add scheduled 8 units Humalog before meals. DM management following. CKD - stable HTN- Losartan 100mg daily. BB. HLD - statin CAD- Plavix DVT prophylaxis - heparin Dc cardiac monitor. Look to dc once have nursing home placement. Signed By: Terese Stanford DO March 12, 2020

## 2020-03-12 NOTE — PROGRESS NOTES
Problem: Mobility Impaired (Adult and Pediatric) Goal: *Acute Goals and Plan of Care (Insert Text) Description LTG: 
(1.)Ms. Hailey Romero will move from supine to sit and sit to supine , scoot up and down and roll side to side in bed with MODIFIED INDEPENDENCE within 7 treatment day(s). (2.)Ms. Hailey Romero will transfer from bed to chair and chair to bed with MODIFIED INDEPENDENCE using the least restrictive device within 7 treatment day(s). (3.)Ms. Hailey Romero will ambulate with MODIFIED INDEPENDENCE for 90+ feet with the least restrictive device within 7 treatment day(s). (4.)Ms. Hailey Romero will perform standing static and dynamic balance activities x 15 minutes with SUPERVISION to improve safety within 7 treatment day(s). ________________________________________________________________________________________________ Outcome: Progressing Towards Goal 
  
PHYSICAL THERAPY: Daily Note and AM 3/12/2020 INPATIENT: PT Visit Days : 4 Payor: [employer] / Plan: [address] / Product Type: [employer] Medicare /   
  
NAME/AGE/GENDER: Roxy Damon is a 78 y.o. female PRIMARY DIAGNOSIS: Diabetic ketoacidosis ([facility] [address] [E11.10] Metabolic acidosis [Z72.1] Dehydration [E86.0] Noncompliance [Z91.19] Hypothermia [T68. XXXA] Diabetes mellitus with hyperosmolarity without hyperglycemic hyperosmolar nonketotic coma ([facility] [address] Diabetes mellitus with hyperosmolarity without hyperglycemic hyperosmolar nonketotic coma ([facility] [address] ICD-10: Treatment Diagnosis:  
 · Generalized Muscle Weakness (M62.81) · Difficulty in walking, Not elsewhere classified (R26.2) Precaution/Allergies: 
Sulfa (sulfonamide antibiotics) ASSESSMENT:  
 
Ms. Hailey Romero is a 78 y.o. female in the hospital for the above. Patient was supine upon contact and agreeable to PT. Patient performs supine to sit with supervision. Once seated EOB patient requests to shower.  Patient transfers to standing with CGA and cues for hand placement/technique. Once standing patient ambulated 250' with rolling walker, CGA and cues for posture/sequencing. Patient returns to the bathroom where she participates in 30 minutes of therapeutic activity including transfers, standing balance, and posture training while performing ADL activity. Patient showed good tolerance for activity. Overall slow, steady progress towards physical therapy goals. Patient's goals listed above are still appropriate. Will continue skilled PT to address remaining deficits. This section established at most recent assessment PROBLEM LIST (Impairments causing functional limitations): 1. Decreased Strength 2. Decreased ADL/Functional Activities 3. Decreased Transfer Abilities 4. Decreased Ambulation Ability/Technique 5. Decreased Balance 6. Decreased Activity Tolerance INTERVENTIONS PLANNED: (Benefits and precautions of physical therapy have been discussed with the patient.) 1. Balance Exercise 2. Bed Mobility 3. Family Education 4. Gait Training 5. Home Exercise Program (HEP) 6. Therapeutic Activites 7. Therapeutic Exercise/Strengthening 8. Transfer Training TREATMENT PLAN: Frequency/Duration: 3 times a week for duration of hospital stay Rehabilitation Potential For Stated Goals: Good REHAB RECOMMENDATIONS (at time of discharge pending progress):   
Placement: It is my opinion, based on this patient's performance to date, that Ms. Cristino Alvarado may benefit from participating in 1-2 additional therapy sessions in order to continue to assess for rehab potential and then make recommendation for disposition at discharge. Equipment:  
? None at this time HISTORY:  
History of Present Injury/Illness (Reason for Referral): 
dehydration Past Medical History/Comorbidities: Ms. Cristino Alvarado  has a past medical history of Acute cystitis without hematuria (1/30/2019), CAD (coronary artery disease), DM2 (diabetes mellitus, type 2) ([facility] [address] Gout, HLD (hyperlipidemia), HTN (hypertension), and Peripheral neuropathy. Ms. Jeanie Smith  has a past surgical history that includes hx tubal ligation; hx coronary artery bypass graft; pr cardiac surg procedure unlist; and hx cholecystectomy. Social History/Living Environment:  
Home Environment: Apartment # Steps to Enter: 4 Rails to Enter: Yes One/Two Story Residence: One story Living Alone: Yes Support Systems: Child(marga) Patient Expects to be Discharged to[de-identified] Rehabilitation facility Current DME Used/Available at Home: Kamar Doctor, straight, Shower chair, Walker, rollator Tub or Shower Type: Tub/Shower combination Prior Level of Function/Work/Activity: 
Was living in mobile home with one of her daughters. PTA ambulating with rollator or SPC. Number of Personal Factors/Comorbidities that affect the Plan of Care: 1-2: MODERATE COMPLEXITY EXAMINATION:  
Most Recent Physical Functioning:  
Gross Assessment: 
  
         
  
Posture: 
  
Balance: 
Sitting: Intact Standing: Impaired Standing - Static: Fair Standing - Dynamic : Fair Bed Mobility: 
Supine to Sit: Supervision Wheelchair Mobility: 
  
Transfers: 
Sit to Stand: Contact guard assistance Stand to Sit: Contact guard assistance Gait: 
  
Speed/Noemi: Slow Step Length: Left shortened;Right shortened Gait Abnormalities: Decreased step clearance;Trunk sway increased; Path deviations; Shuffling gait Distance (ft): 250 Feet (ft) Assistive Device: Walker, rolling;Gait belt Ambulation - Level of Assistance: Contact guard assistance Interventions: Safety awareness training;Verbal cues Body Structures Involved: 1. Metabolic 2. Endocrine 3. Muscles Body Functions Affected: 1. Neuromusculoskeletal 
2. Movement Related 3. Metobolic/Endocrine Activities and Participation Affected: 1. General Tasks and Demands 2. Mobility 3. Domestic Life 4. Community, Social and [address] [place] [address] Number of elements that affect the Plan of Care: 4+: HIGH COMPLEXITY CLINICAL PRESENTATION:  
Presentation: Stable and uncomplicated: LOW COMPLEXITY CLINICAL DECISION MAKING:  
[employer] AM-PAC 6 Clicks Basic Mobility Inpatient Short Form How much difficulty does the patient currently have. .. Unable A Lot A Little None 1. Turning over in bed (including adjusting bedclothes, sheets and blankets)? [] 1   [] 2   [] 3   [x] 4  
2. Sitting down on and standing up from a chair with arms ( e.g., wheelchair, bedside commode, etc.)   [] 1   [] 2   [x] 3   [] 4  
3. Moving from lying on back to sitting on the side of the bed? [] 1   [] 2   [] 3   [x] 4 How much help from another person does the patient currently need. .. Total A Lot A Little None 4. Moving to and from a bed to a chair (including a wheelchair)? [] 1   [] 2   [x] 3   [] 4  
5. Need to walk in hospital room? [] 1   [] 2   [] 3   [x] 4  
6. Climbing 3-5 steps with a railing? [] 1   [] 2   [x] 3   [] 4  
© 2007, Trustees of [facility] [employer], under license to [employer]. All rights reserved Score:  Initial: 21 Most Recent: X (Date: -- ) Interpretation of Tool:  Represents activities that are increasingly more difficult (i.e. Bed mobility, Transfers, Gait). Medical Necessity:    
· Patient demonstrates · good ·  rehab potential due to higher previous functional level. Reason for Services/Other Comments: 
· Patient continues to require skilled intervention due to · Decreased balance and functional mobility · . Use of outcome tool(s) and clinical judgement create a POC that gives a: Clear prediction of patient's progress: LOW COMPLEXITY  
  
 
 
 
TREATMENT:  
(In addition to Assessment/Re-Assessment sessions the following treatments were rendered) Pre-treatment Symptoms/Complaints: none Pain: Initial:  
Pain Intensity 1: 0 0 Post Session:  0  
 
 Therapeutic Activity: (    40 Minutes): Therapeutic activities including bed mobility training, transfer training from various surface heights, ambulation on level ground, static/dynamic standing balance, scooting, posture training, instruction in sequencing with rolling walker, and patient education to improve mobility, strength and balance. Required moderate Safety awareness training;Verbal cues to promote static and dynamic balance in standing and promote coordination of bilateral, lower extremity(s). Therapeutic Exercise: (   Minutes):  Exercises per grid below to improve mobility, strength and balance. Required moderate visual and verbal cues to promote proper body alignment, promote proper body posture and promote proper body mechanics. Progressed range, repetitions and complexity of movement as indicated. Date: 
3/11/20 Date: 
 Date: 
  
ACTIVITY/EXERCISE AM PM AM PM AM PM  
Ambulation:           Distance Device Duration Seated Heel Raises 2x15B A Seated Toe Raises 2x15B A Seated Long Arc Quads 2x15B A Seated Marching 2x15B A Seated Hip Abduction B = bilateral; AA = active assistive; A = active; P = passive Braces/Orthotics/Lines/Etc:  
· None Treatment/Session Assessment:   
· Response to Treatment: See above · Interdisciplinary Collaboration:  
o Physical Therapy Assistant 
o Registered Nurse · After treatment position/precautions:  
o Up in chair 
o Bed alarm/tab alert on 
o Bed/Chair-wheels locked 
o Call light within reach 
o RN notified · Compliance with Program/Exercises: Will assess as treatment progresses · Recommendations/Intent for next treatment session: \"Next visit will focus on advancements to more challenging activities and reduction in assistance provided\". Total Treatment Duration: PT Patient Time In/Time Out Time In: 1055 Time Out: 1135 Marc Huntley, ARVIND

## 2020-03-13 LAB
BACTERIA SPEC CULT: ABNORMAL
BACTERIA SPEC CULT: ABNORMAL
BACTERIA SPEC CULT: NORMAL
BACTERIA SPEC CULT: NORMAL
GLUCOSE BLD STRIP.AUTO-MCNC: 180 MG/DL (ref 65–100)
GLUCOSE BLD STRIP.AUTO-MCNC: 207 MG/DL (ref 65–100)
GLUCOSE BLD STRIP.AUTO-MCNC: 210 MG/DL (ref 65–100)
GLUCOSE BLD STRIP.AUTO-MCNC: 385 MG/DL (ref 65–100)
SERVICE CMNT-IMP: ABNORMAL
SERVICE CMNT-IMP: NORMAL
SERVICE CMNT-IMP: NORMAL

## 2020-03-13 PROCEDURE — 74011250637 HC RX REV CODE- 250/637: Performed by: INTERNAL MEDICINE

## 2020-03-13 PROCEDURE — 74011250637 HC RX REV CODE- 250/637: Performed by: FAMILY MEDICINE

## 2020-03-13 PROCEDURE — 74011636637 HC RX REV CODE- 636/637: Performed by: INTERNAL MEDICINE

## 2020-03-13 PROCEDURE — 65270000029 HC RM PRIVATE

## 2020-03-13 PROCEDURE — 74011250636 HC RX REV CODE- 250/636: Performed by: HOSPITALIST

## 2020-03-13 PROCEDURE — 82962 GLUCOSE BLOOD TEST: CPT

## 2020-03-13 PROCEDURE — 74011250637 HC RX REV CODE- 250/637: Performed by: HOSPITALIST

## 2020-03-13 PROCEDURE — 74011636637 HC RX REV CODE- 636/637: Performed by: FAMILY MEDICINE

## 2020-03-13 RX ORDER — INSULIN LISPRO 100 [IU]/ML
16 INJECTION, SOLUTION INTRAVENOUS; SUBCUTANEOUS
Status: DISCONTINUED | OUTPATIENT
Start: 2020-03-13 | End: 2020-03-14

## 2020-03-13 RX ORDER — INSULIN GLARGINE 100 [IU]/ML
25 INJECTION, SOLUTION SUBCUTANEOUS
Status: DISCONTINUED | OUTPATIENT
Start: 2020-03-13 | End: 2020-03-16 | Stop reason: HOSPADM

## 2020-03-13 RX ORDER — INSULIN LISPRO 100 [IU]/ML
12 INJECTION, SOLUTION INTRAVENOUS; SUBCUTANEOUS
Status: DISCONTINUED | OUTPATIENT
Start: 2020-03-13 | End: 2020-03-13

## 2020-03-13 RX ADMIN — INSULIN LISPRO 4 UNITS: 100 INJECTION, SOLUTION INTRAVENOUS; SUBCUTANEOUS at 08:22

## 2020-03-13 RX ADMIN — INSULIN GLARGINE 25 UNITS: 100 INJECTION, SOLUTION SUBCUTANEOUS at 21:41

## 2020-03-13 RX ADMIN — INSULIN LISPRO 4 UNITS: 100 INJECTION, SOLUTION INTRAVENOUS; SUBCUTANEOUS at 17:40

## 2020-03-13 RX ADMIN — METOPROLOL TARTRATE 12.5 MG: 25 TABLET, FILM COATED ORAL at 21:40

## 2020-03-13 RX ADMIN — HEPARIN SODIUM 5000 UNITS: 5000 INJECTION INTRAVENOUS; SUBCUTANEOUS at 05:03

## 2020-03-13 RX ADMIN — LOSARTAN POTASSIUM 100 MG: 50 TABLET, FILM COATED ORAL at 08:23

## 2020-03-13 RX ADMIN — GUAIFENESIN 600 MG: 600 TABLET ORAL at 08:24

## 2020-03-13 RX ADMIN — INSULIN LISPRO 12 UNITS: 100 INJECTION, SOLUTION INTRAVENOUS; SUBCUTANEOUS at 11:37

## 2020-03-13 RX ADMIN — Medication 10 ML: at 21:42

## 2020-03-13 RX ADMIN — HEPARIN SODIUM 5000 UNITS: 5000 INJECTION INTRAVENOUS; SUBCUTANEOUS at 17:40

## 2020-03-13 RX ADMIN — Medication 1 AMPULE: at 21:40

## 2020-03-13 RX ADMIN — Medication 1 AMPULE: at 08:24

## 2020-03-13 RX ADMIN — GABAPENTIN 100 MG: 100 CAPSULE ORAL at 21:41

## 2020-03-13 RX ADMIN — INSULIN LISPRO 16 UNITS: 100 INJECTION, SOLUTION INTRAVENOUS; SUBCUTANEOUS at 17:40

## 2020-03-13 RX ADMIN — Medication 10 ML: at 05:03

## 2020-03-13 RX ADMIN — CLOPIDOGREL BISULFATE 75 MG: 75 TABLET ORAL at 08:24

## 2020-03-13 RX ADMIN — GUAIFENESIN 600 MG: 600 TABLET ORAL at 21:41

## 2020-03-13 RX ADMIN — GABAPENTIN 100 MG: 100 CAPSULE ORAL at 17:40

## 2020-03-13 RX ADMIN — INSULIN LISPRO 12 UNITS: 100 INJECTION, SOLUTION INTRAVENOUS; SUBCUTANEOUS at 08:23

## 2020-03-13 RX ADMIN — SENNOSIDES AND DOCUSATE SODIUM 1 TABLET: 8.6; 5 TABLET ORAL at 08:24

## 2020-03-13 RX ADMIN — GABAPENTIN 100 MG: 100 CAPSULE ORAL at 08:23

## 2020-03-13 RX ADMIN — INSULIN LISPRO 10 UNITS: 100 INJECTION, SOLUTION INTRAVENOUS; SUBCUTANEOUS at 11:36

## 2020-03-13 RX ADMIN — ROSUVASTATIN CALCIUM 20 MG: 20 TABLET, COATED ORAL at 21:40

## 2020-03-13 RX ADMIN — INSULIN LISPRO 2 UNITS: 100 INJECTION, SOLUTION INTRAVENOUS; SUBCUTANEOUS at 21:42

## 2020-03-13 RX ADMIN — METOPROLOL TARTRATE 12.5 MG: 25 TABLET, FILM COATED ORAL at 08:23

## 2020-03-13 NOTE — DIABETES MGMT
Noted most recent FSBS 385. Provider updated via [employer] regarding patient glycemic control and recommendations. Patient would likely benefit from an increase in prandial insulin to help offset post meal glucose spike and an increase in basal insulin as fasting blood glucose is not at goal. Spoke with provider new orders received to increase Lantus to 25 units nightly and to increase Humalog 16 units with meals. Update: Dropped off information as requested by patient regarding CGM and talking glucometer. Patient then wanted to discuss her dinner option for tonight. Patient stated \"they offered fish, rice, and carrots, I've never heard of fish and rice. \" Patient states \"I got a  salad and fish on the side with unsweetened tea. \" Reviewed the plate method with patient and commended her on trying to follow diabetic diet. Reviewed with patient her current regimen: Lantus 25 units nightly, Humalog 16 units with meals, and Humalog SSI. Encouraged compliance with discharge regimen. Patient voices no further questions regarding diabetes management at this time.

## 2020-03-13 NOTE — DIABETES MGMT
Patient seen for follow up diabetes education. Patient in bed no visitors present. Patient states she ate 2 boxes of rice krispies, 1 carton of milk, a banana, coffee, and webb this AM. Patient still struggling to remember what foods are carbs versus protein. Patient also struggling to know which foods affect glycemic control. Again reviewed the effects of carbohydrates on glycemic control. Discussed the plate method with patient. Discussed the importance of portion control and discussed the basics of counting carbs. Patient did try crystal lite lemonade and states she likes it. Commended patient on making changes to her drink choices. Patient states \"I had spaghetti last night. \" Patient states \"so I guess that's the portion I should eat. \" Also discussed making dietary changes when eating out and limiting carbs while increase vegetables in diet. Patient has questions regarding dried fruits. Discussed glycemic index of dried versus regular fruit. Patient states \"I do get the peaches no in heavy syrup. \" Patient also wanted to discuss sugar free ice cream but does admit to not controlling portions when eating ice cream. Also counseled patient that just because a label says sugar free does not necessarily mean it is carb free. Patient verbalized understanding. Educated patient regarding current basal/bolus regimen of Lantus 22 units nightly and Humalog 12 units with meals including type of insulin, timing with meals, onset, duration of effect, and peak of insulin dose. Educated patient on the differences between prandial insulin and sliding scale insulin. Instructed patient to always seek guidance from their primary care provider about adjusting their insulin doses and not to adjust them on their own as this could negatively impact their glycemic control or result in hypoglycemia. Patient verbalizes understanding. Per chart review plan is for patient to discharge to rehab when ready. Patient states she is hopeful to get a talking meter and magnifying glass when she goes home. Also reviewed CGM therapy with patient. Patient verbalizes understanding and voices no further questions regarding diabetes management at this time.

## 2020-03-13 NOTE — PROGRESS NOTES
Hourly rounds completed. Patient is alert and oriented. Ambulates in room. Tolerating diet. No needs at this time. Will continue to monitor and give report to oncoming RN.

## 2020-03-13 NOTE — DIABETES MGMT
Blood glucose ranged 105-286 yesterday with patient receiving Lantus 22 units and Humalog 36 units. Blood glucose this morning was 210. Reviewed patient current regimen: Lantus 22 units nightly, Humalog 8 units with meals, and Humalog SSI. Spoke with provider, discussed patient glycemic control. New orders received to increase prandial insulin to Humalog 12 units with meals. Will continue to follow along.

## 2020-03-13 NOTE — PROGRESS NOTES
The patient has been accepted to [facility]. Bed offer has been accepted by the patient. Awaiting insurance approval. The patient will likely discharge to facility Monday 3/16/2020 due to the next bed being available and if insurance provides approval. CM continues to follow the patient.

## 2020-03-13 NOTE — PROGRESS NOTES
Progress Note Patient: Jim Wu MRN: [id number]  SSN: [id number] YOB: 1940  Age: 78 y.o. Sex: female Admit Date: 3/8/2020 LOS: 5 days Subjective:  
F/U HHS 
 
77 yo hx of CAD, HTN, DM type 2, HLD, peripheral neuropathy, CKD stage III, ESBL UTI who was admitted on 3/8/20 for HHS. Glucose 545 on arrival, anion gap 20, bicarb 15. No ketones in urine. Originally placed in ICU and then quickly moved to regular floor. Admits to not using her insulin at home. A1C 12.7. DM management consulted. Since insulin regimen changed, her glucose levels have started to improve. Glucose 210 this AM. Glucose elevated at lunch and dinner. No chest pain or SOB. Looking into nursing homes. Current Facility-Administered Medications Medication Dose Route Frequency  insulin lispro (HUMALOG) injection 12 Units  12 Units SubCUTAneous TIDAC  insulin glargine (LANTUS) injection 22 Units  22 Units SubCUTAneous QHS  guaiFENesin ER (MUCINEX) tablet 600 mg  600 mg Oral Q12H  lip protectant (BLISTEX) ointment 1 Each  1 Each Topical PRN  
 alcohol 62% (NOZIN) nasal  1 Ampule  1 Ampule Topical Q12H  
 benzonatate (TESSALON) capsule 100 mg  100 mg Oral TID PRN  
 clopidogreL (PLAVIX) tablet 75 mg  75 mg Oral DAILY  gabapentin (NEURONTIN) capsule 100 mg  100 mg Oral TID  rOPINIRole (REQUIP) tablet 0.5 mg  0.5 mg Oral QHS PRN  
 rosuvastatin (CRESTOR) tablet 20 mg  20 mg Oral QHS  losartan (COZAAR) tablet 100 mg  100 mg Oral DAILY  sodium chloride (NS) flush 5-40 mL  5-40 mL IntraVENous Q8H  
 sodium chloride (NS) flush 5-40 mL  5-40 mL IntraVENous PRN  
 acetaminophen (TYLENOL) tablet 650 mg  650 mg Oral Q4H PRN  
 HYDROcodone-acetaminophen (NORCO) 5-325 mg per tablet 1 Tab  1 Tab Oral Q6H PRN  
 naloxone (NARCAN) injection 0.4 mg  0.4 mg IntraVENous PRN  
 senna-docusate (PERICOLACE) 8.6-50 mg per tablet 1 Tab  1 Tab Oral DAILY  heparin (porcine) injection 5,000 Units  5,000 Units SubCUTAneous Q12H  
 metoprolol tartrate (LOPRESSOR) tablet 12.5 mg  12.5 mg Oral Q12H  
 methocarbamoL (ROBAXIN) tablet 500 mg  500 mg Oral TID PRN  
 insulin lispro (HUMALOG) injection   SubCUTAneous AC&HS Objective:  
 
Vitals:  
 03/12/20 1908 03/12/20 2300 03/13/20 0347 03/13/20 0645 BP: 148/80 151/85 144/85 133/69 Pulse: 71 89 82 (!) 56 Resp: 18 18 18 18 Temp: 98.2 °F (36.8 °C) 98.3 °F (36.8 °C) 98.3 °F (36.8 °C) 98.2 °F (36.8 °C) SpO2: 96% 96% 95% 96% Weight:      
Height:      
  
  
Intake and Output: 
Current Shift: No intake/output data recorded. Last three shifts: 03/11 1901 - 03/13 0700 In: 180 [P.O.:180] Out: 500 [Urine:500] Physical Exam:  
General:  Alert, cooperative, no distress, appears stated age. Eating breakfast  
Eyes:  Conjunctivae/corneas clear. Ears:  Normal TMs and external ear canals both ears. Nose: Nares normal. Septum midline. Mouth/Throat: Lips, mucosa, and tongue normal.   
Neck:  no JVD. Back:   deferred Lungs:   Clear to auscultation bilaterally. Heart:  Regular rate and rhythm, S1, S2 normal  
Abdomen:   Soft, non-tender. Bowel sounds normal.   
Extremities: 1+ edema to LE bilaterally Pulses: 2+ and symmetric all extremities. Skin: Skin color, texture, turgor normal. No rashes or lesions Lymph nodes: Cervical, supraclavicular, and axillary nodes normal.  
Neurologic: CNII-XII intact. Lab/Data Review: 
 
Recent Results (from the past 24 hour(s)) GLUCOSE, POC Collection Time: 03/12/20 11:50 AM  
Result Value Ref Range Glucose (POC) 252 (H) 65 - 100 mg/dL GLUCOSE, POC Collection Time: 03/12/20  4:03 PM  
Result Value Ref Range Glucose (POC) 208 (H) 65 - 100 mg/dL GLUCOSE, POC Collection Time: 03/12/20  8:48 PM  
Result Value Ref Range Glucose (POC) 286 (H) 65 - 100 mg/dL GLUCOSE, POC Collection Time: 03/13/20  6:50 AM  
Result Value Ref Range Glucose (POC) 210 (H) 65 - 100 mg/dL Assessment/ Plan:  
 
Principal Problem: 
  Diabetes mellitus with hyperosmolarity without hyperglycemic hyperosmolar nonketotic coma ([address] (11/24/2014) Active Problems: 
  Coronary artery disease involving coronary bypass graft of native heart without angina pectoris (6/15/2015) Uncontrolled type 2 diabetes mellitus with hyperglycemia ([address] (6/15/2015) Essential hypertension (6/15/2015) HLD (hyperlipidemia) (6/15/2015) Chronic kidney disease, stage III (moderate) (HCC) (6/15/2015) Gastroesophageal reflux disease without esophagitis (6/15/2015) Peripheral neuropathy (6/15/2015) Insomnia (6/15/2015) RLS (restless legs syndrome) (6/15/2015) Generalized weakness (9/16/2019) Dehydration (3/8/2020) Hypothermia (3/8/2020) Metabolic acidosis (7/7/6380) Noncompliance (3/8/2020) Hypoalbuminemia due to protein-calorie malnutrition ([facility] [address] (3/8/2020) HHS - secondary to noncompliance. Lantus 22 units. SS. Add scheduled 12 units Humalog before meals. DM management following. CKD - stable HTN- Losartan 100mg daily. BB. HLD - statin CAD- Plavix DVT prophylaxis - heparin Dc cardiac monitor. Look to dc once have nursing home placement. Likely Monday Signed By: Susana Richmond DO March 13, 2020

## 2020-03-13 NOTE — PROGRESS NOTES
Problem: Diabetes Self-Management Goal: *Using medications safely Description: State effect of diabetes medications on diabetes; name diabetes medication taking, action and side effects. Outcome: Progressing Towards Goal 
Goal: *Monitoring blood glucose, interpreting and using results Description: Identify recommended blood glucose targets  and personal targets. Outcome: Progressing Towards Goal 
  
Problem: Falls - Risk of 
Goal: *Absence of Falls Description: Document aFraz Bruner Fall Risk and appropriate interventions in the flowsheet. Outcome: Progressing Towards Goal 
Note: Fall Risk Interventions: 
Mobility Interventions: Patient to call before getting OOB Medication Interventions: Patient to call before getting OOB Elimination Interventions: Patient to call for help with toileting needs Problem: Pressure Injury - Risk of 
Goal: *Prevention of pressure injury Description: Document Ashu Scale and appropriate interventions in the flowsheet. Outcome: Progressing Towards Goal 
Note: Pressure Injury Interventions: Activity Interventions: Pressure redistribution bed/mattress(bed type) Mobility Interventions: Pressure redistribution bed/mattress (bed type) Nutrition Interventions: Document food/fluid/supplement intake Friction and Shear Interventions: Apply protective barrier, creams and emollients

## 2020-03-13 NOTE — PROGRESS NOTES
CM was notified by Abel Bridges with [facility], next available bed is on Monday 3/16/2020 . CM awaiting update regarding bed availability with [address] CM continues to follow the patient.

## 2020-03-14 LAB
GLUCOSE BLD STRIP.AUTO-MCNC: 157 MG/DL (ref 65–100)
GLUCOSE BLD STRIP.AUTO-MCNC: 202 MG/DL (ref 65–100)
GLUCOSE BLD STRIP.AUTO-MCNC: 208 MG/DL (ref 65–100)
GLUCOSE BLD STRIP.AUTO-MCNC: 279 MG/DL (ref 65–100)

## 2020-03-14 PROCEDURE — 74011250637 HC RX REV CODE- 250/637: Performed by: INTERNAL MEDICINE

## 2020-03-14 PROCEDURE — 74011250637 HC RX REV CODE- 250/637: Performed by: HOSPITALIST

## 2020-03-14 PROCEDURE — 74011250636 HC RX REV CODE- 250/636: Performed by: HOSPITALIST

## 2020-03-14 PROCEDURE — 65270000029 HC RM PRIVATE

## 2020-03-14 PROCEDURE — 74011636637 HC RX REV CODE- 636/637: Performed by: FAMILY MEDICINE

## 2020-03-14 PROCEDURE — 74011250637 HC RX REV CODE- 250/637: Performed by: FAMILY MEDICINE

## 2020-03-14 PROCEDURE — 74011636637 HC RX REV CODE- 636/637: Performed by: INTERNAL MEDICINE

## 2020-03-14 PROCEDURE — 82962 GLUCOSE BLOOD TEST: CPT

## 2020-03-14 RX ORDER — INSULIN LISPRO 100 [IU]/ML
14 INJECTION, SOLUTION INTRAVENOUS; SUBCUTANEOUS
Status: DISCONTINUED | OUTPATIENT
Start: 2020-03-14 | End: 2020-03-16 | Stop reason: HOSPADM

## 2020-03-14 RX ADMIN — ROSUVASTATIN CALCIUM 20 MG: 20 TABLET, COATED ORAL at 21:28

## 2020-03-14 RX ADMIN — LOSARTAN POTASSIUM 100 MG: 50 TABLET, FILM COATED ORAL at 08:24

## 2020-03-14 RX ADMIN — CLOPIDOGREL BISULFATE 75 MG: 75 TABLET ORAL at 08:23

## 2020-03-14 RX ADMIN — Medication 1 AMPULE: at 21:29

## 2020-03-14 RX ADMIN — GABAPENTIN 100 MG: 100 CAPSULE ORAL at 17:15

## 2020-03-14 RX ADMIN — INSULIN LISPRO 4 UNITS: 100 INJECTION, SOLUTION INTRAVENOUS; SUBCUTANEOUS at 21:31

## 2020-03-14 RX ADMIN — INSULIN GLARGINE 25 UNITS: 100 INJECTION, SOLUTION SUBCUTANEOUS at 22:00

## 2020-03-14 RX ADMIN — HEPARIN SODIUM 5000 UNITS: 5000 INJECTION INTRAVENOUS; SUBCUTANEOUS at 05:55

## 2020-03-14 RX ADMIN — SENNOSIDES AND DOCUSATE SODIUM 1 TABLET: 8.6; 5 TABLET ORAL at 08:25

## 2020-03-14 RX ADMIN — GABAPENTIN 100 MG: 100 CAPSULE ORAL at 21:28

## 2020-03-14 RX ADMIN — INSULIN LISPRO 16 UNITS: 100 INJECTION, SOLUTION INTRAVENOUS; SUBCUTANEOUS at 08:24

## 2020-03-14 RX ADMIN — GABAPENTIN 100 MG: 100 CAPSULE ORAL at 08:24

## 2020-03-14 RX ADMIN — GUAIFENESIN 600 MG: 600 TABLET ORAL at 21:29

## 2020-03-14 RX ADMIN — GUAIFENESIN 600 MG: 600 TABLET ORAL at 08:23

## 2020-03-14 RX ADMIN — METOPROLOL TARTRATE 12.5 MG: 25 TABLET, FILM COATED ORAL at 08:23

## 2020-03-14 RX ADMIN — Medication 10 ML: at 05:55

## 2020-03-14 RX ADMIN — Medication 10 ML: at 21:28

## 2020-03-14 RX ADMIN — INSULIN LISPRO 6 UNITS: 100 INJECTION, SOLUTION INTRAVENOUS; SUBCUTANEOUS at 12:09

## 2020-03-14 RX ADMIN — INSULIN LISPRO 4 UNITS: 100 INJECTION, SOLUTION INTRAVENOUS; SUBCUTANEOUS at 17:15

## 2020-03-14 RX ADMIN — Medication 1 AMPULE: at 08:24

## 2020-03-14 RX ADMIN — HEPARIN SODIUM 5000 UNITS: 5000 INJECTION INTRAVENOUS; SUBCUTANEOUS at 17:15

## 2020-03-14 RX ADMIN — INSULIN LISPRO 14 UNITS: 100 INJECTION, SOLUTION INTRAVENOUS; SUBCUTANEOUS at 12:09

## 2020-03-14 RX ADMIN — INSULIN LISPRO 14 UNITS: 100 INJECTION, SOLUTION INTRAVENOUS; SUBCUTANEOUS at 17:15

## 2020-03-14 NOTE — PROGRESS NOTES
Problem: Diabetes Self-Management Goal: *Disease process and treatment process Description: Define diabetes and identify own type of diabetes; list 3 options for treating diabetes. Outcome: Progressing Towards Goal 
Goal: *Incorporating nutritional management into lifestyle Description: Describe effect of type, amount and timing of food on blood glucose; list 3 methods for planning meals. Outcome: Progressing Towards Goal 
Goal: *Incorporating physical activity into lifestyle Description: State effect of exercise on blood glucose levels. Outcome: Progressing Towards Goal 
Goal: *Developing strategies to promote health/change behavior Description: Define the ABC's of diabetes; identify appropriate screenings, schedule and personal plan for screenings. Outcome: Progressing Towards Goal 
Goal: *Using medications safely Description: State effect of diabetes medications on diabetes; name diabetes medication taking, action and side effects. Outcome: Progressing Towards Goal 
Goal: *Monitoring blood glucose, interpreting and using results Description: Identify recommended blood glucose targets  and personal targets. Outcome: Progressing Towards Goal 
Goal: *Prevention, detection, treatment of acute complications Description: List symptoms of hyper- and hypoglycemia; describe how to treat low blood sugar and actions for lowering  high blood glucose level. Outcome: Progressing Towards Goal 
Goal: *Prevention, detection and treatment of chronic complications Description: Define the natural course of diabetes and describe the relationship of blood glucose levels to long term complications of diabetes. Outcome: Progressing Towards Goal 
Goal: *Developing strategies to address psychosocial issues Description: Describe feelings about living with diabetes; identify support needed and support network Outcome: Progressing Towards Goal 
Goal: *Insulin pump training Outcome: Progressing Towards Goal 
 Goal: *Sick day guidelines Outcome: Progressing Towards Goal 
Goal: *Patient Specific Goal (EDIT GOAL, INSERT TEXT) Outcome: Progressing Towards Goal 
  
Problem: Patient Education: Go to Patient Education Activity Goal: Patient/Family Education Outcome: Progressing Towards Goal 
  
Problem: Risk for Spread of Infection Goal: Prevent transmission of infectious organism to others Description: Prevent the transmission of infectious organisms to other patients, staff members, and visitors. Outcome: Progressing Towards Goal 
  
Problem: Patient Education:  Go to Education Activity Goal: Patient/Family Education Outcome: Progressing Towards Goal 
  
Problem: Falls - Risk of 
Goal: *Absence of Falls Description: Document Mario Alberto Hummeler Fall Risk and appropriate interventions in the flowsheet. Outcome: Progressing Towards Goal 
Note: Fall Risk Interventions: 
Mobility Interventions: OT consult for ADLs, Patient to call before getting OOB, PT Consult for mobility concerns, PT Consult for assist device competence, Utilize walker, cane, or other assistive device Medication Interventions: Evaluate medications/consider consulting pharmacy, Patient to call before getting OOB, Teach patient to arise slowly Elimination Interventions: Call light in reach, Patient to call for help with toileting needs, Stay With Me (per policy), Toilet paper/wipes in reach, Toileting schedule/hourly rounds Problem: Patient Education: Go to Patient Education Activity Goal: Patient/Family Education Outcome: Progressing Towards Goal 
  
Problem: Pressure Injury - Risk of 
Goal: *Prevention of pressure injury Description: Document Ashu Scale and appropriate interventions in the flowsheet. Outcome: Progressing Towards Goal 
Note: Pressure Injury Interventions: Activity Interventions: Assess need for specialty bed, Increase time out of bed, Pressure redistribution bed/mattress(bed type), PT/OT evaluation Mobility Interventions: Assess need for specialty bed, HOB 30 degrees or less, Pressure redistribution bed/mattress (bed type), PT/OT evaluation Nutrition Interventions: Document food/fluid/supplement intake Friction and Shear Interventions: Apply protective barrier, creams and emollients, HOB 30 degrees or less, Lift sheet, Lift team/patient mobility team, Minimize layers Problem: Patient Education: Go to Patient Education Activity Goal: Patient/Family Education Outcome: Progressing Towards Goal 
  
Problem: Patient Education: Go to Patient Education Activity Goal: Patient/Family Education Outcome: Progressing Towards Goal 
  
Problem: Patient Education: Go to Patient Education Activity Goal: Patient/Family Education Outcome: Progressing Towards Goal 
  
Problem: Falls - Risk of 
Goal: *Absence of Falls Description: Document Gene Powell Fall Risk and appropriate interventions in the flowsheet. Outcome: Progressing Towards Goal 
Note: Fall Risk Interventions: 
Mobility Interventions: OT consult for ADLs, Patient to call before getting OOB, PT Consult for mobility concerns, PT Consult for assist device competence, Utilize walker, cane, or other assistive device Medication Interventions: Evaluate medications/consider consulting pharmacy, Patient to call before getting OOB, Teach patient to arise slowly Elimination Interventions: Call light in reach, Patient to call for help with toileting needs, Stay With Me (per policy), Toilet paper/wipes in reach, Toileting schedule/hourly rounds Problem: Patient Education: Go to Patient Education Activity Goal: Patient/Family Education Outcome: Progressing Towards Goal

## 2020-03-14 NOTE — PROGRESS NOTES
Progress Note Patient: Ene Berger MRN: [id number]  SSN: [id number] YOB: 1940  Age: 78 y.o. Sex: female Admit Date: 3/8/2020 LOS: 6 days Subjective:  
F/U HHS 
 
79 yo hx of CAD, HTN, DM type 2, HLD, peripheral neuropathy, CKD stage III, ESBL UTI who was admitted on 3/8/20 for HHS. Glucose 545 on arrival, anion gap 20, bicarb 15. No ketones in urine. Originally placed in ICU and then quickly moved to regular floor. Admits to not using her insulin at home. A1C 12.7. DM management consulted. Since insulin regimen changed, her glucose levels have started to improve. Glucose 157 this AM. No chest pain or SOB. Current Facility-Administered Medications Medication Dose Route Frequency  insulin lispro (HUMALOG) injection 14 Units  14 Units SubCUTAneous TIDAC  insulin glargine (LANTUS) injection 25 Units  25 Units SubCUTAneous QHS  guaiFENesin ER (MUCINEX) tablet 600 mg  600 mg Oral Q12H  lip protectant (BLISTEX) ointment 1 Each  1 Each Topical PRN  
 alcohol 62% (NOZIN) nasal  1 Ampule  1 Ampule Topical Q12H  
 benzonatate (TESSALON) capsule 100 mg  100 mg Oral TID PRN  
 clopidogreL (PLAVIX) tablet 75 mg  75 mg Oral DAILY  gabapentin (NEURONTIN) capsule 100 mg  100 mg Oral TID  rOPINIRole (REQUIP) tablet 0.5 mg  0.5 mg Oral QHS PRN  
 rosuvastatin (CRESTOR) tablet 20 mg  20 mg Oral QHS  losartan (COZAAR) tablet 100 mg  100 mg Oral DAILY  sodium chloride (NS) flush 5-40 mL  5-40 mL IntraVENous Q8H  
 sodium chloride (NS) flush 5-40 mL  5-40 mL IntraVENous PRN  
 acetaminophen (TYLENOL) tablet 650 mg  650 mg Oral Q4H PRN  
 HYDROcodone-acetaminophen (NORCO) 5-325 mg per tablet 1 Tab  1 Tab Oral Q6H PRN  
 naloxone (NARCAN) injection 0.4 mg  0.4 mg IntraVENous PRN  
 senna-docusate (PERICOLACE) 8.6-50 mg per tablet 1 Tab  1 Tab Oral DAILY  heparin (porcine) injection 5,000 Units  5,000 Units SubCUTAneous Q12H  metoprolol tartrate (LOPRESSOR) tablet 12.5 mg  12.5 mg Oral Q12H  
 methocarbamoL (ROBAXIN) tablet 500 mg  500 mg Oral TID PRN  
 insulin lispro (HUMALOG) injection   SubCUTAneous AC&HS Objective:  
 
Vitals:  
 03/13/20 1855 03/13/20 2326 03/14/20 5530 03/14/20 0720 BP: 129/73 112/58 156/86 128/72 Pulse: 78 (!) 58 95 (!) 55 Resp: 18 17 18 18 Temp: 98.2 °F (36.8 °C) 98.8 °F (37.1 °C) 98.3 °F (36.8 °C) 98.3 °F (36.8 °C) SpO2: 96% 94% 95% 96% Weight:      
Height:      
  
  
Intake and Output: 
Current Shift: No intake/output data recorded. Last three shifts: 03/12 1901 - 03/14 0700 In: 480 [P.O.:480] Out: - Physical Exam:  
General:  Alert, cooperative, no distress Eyes:  Conjunctivae/corneas clear. Ears:  Normal TMs and external ear canals both ears. Nose: Nares normal. Septum midline. Mouth/Throat: Lips, mucosa, and tongue normal.   
Neck:  no JVD. Back:   deferred Lungs:   Clear to auscultation bilaterally. Heart:  Regular rate and rhythm, S1, S2 normal  
Abdomen:   Soft, non-tender. Bowel sounds normal.   
Extremities: trace edema to LE bilaterally Pulses: 2+ and symmetric all extremities. Skin: Skin color, texture, turgor normal. No rashes or lesions Lymph nodes: Cervical, supraclavicular, and axillary nodes normal.  
Neurologic: CNII-XII intact. Lab/Data Review: 
 
Recent Results (from the past 24 hour(s)) GLUCOSE, POC Collection Time: 03/13/20 11:09 AM  
Result Value Ref Range Glucose (POC) 385 (H) 65 - 100 mg/dL GLUCOSE, POC Collection Time: 03/13/20  4:07 PM  
Result Value Ref Range Glucose (POC) 207 (H) 65 - 100 mg/dL GLUCOSE, POC Collection Time: 03/13/20  9:10 PM  
Result Value Ref Range Glucose (POC) 180 (H) 65 - 100 mg/dL GLUCOSE, POC Collection Time: 03/14/20  7:19 AM  
Result Value Ref Range Glucose (POC) 157 (H) 65 - 100 mg/dL Assessment/ Plan:  
 
Principal Problem: Diabetes mellitus with hyperosmolarity without hyperglycemic hyperosmolar nonketotic coma ([facility] [address] (11/24/2014) Active Problems: 
  Coronary artery disease involving coronary bypass graft of native heart without angina pectoris (6/15/2015) Uncontrolled type 2 diabetes mellitus with hyperglycemia ([address] (6/15/2015) Essential hypertension (6/15/2015) HLD (hyperlipidemia) (6/15/2015) Chronic kidney disease, stage III (moderate) (HCC) (6/15/2015) Gastroesophageal reflux disease without esophagitis (6/15/2015) Peripheral neuropathy (6/15/2015) Insomnia (6/15/2015) RLS (restless legs syndrome) (6/15/2015) Generalized weakness (9/16/2019) Dehydration (3/8/2020) Hypothermia (3/8/2020) Metabolic acidosis (6/3/8178) Noncompliance (3/8/2020) Hypoalbuminemia due to protein-calorie malnutrition ([facility] [address] (3/8/2020) HHS - secondary to noncompliance. Lantus 25 units. SS. Add scheduled 14 units Humalog before meals. DM management following. CKD - stable HTN- Losartan 100mg daily. BB. HLD - statin CAD- Plavix DVT prophylaxis - heparin Look to dc  Monday to nursing facility Signed By: Delilah Nassar DO March 14, 2020

## 2020-03-15 LAB
GLUCOSE BLD STRIP.AUTO-MCNC: 142 MG/DL (ref 65–100)
GLUCOSE BLD STRIP.AUTO-MCNC: 153 MG/DL (ref 65–100)
GLUCOSE BLD STRIP.AUTO-MCNC: 192 MG/DL (ref 65–100)
GLUCOSE BLD STRIP.AUTO-MCNC: 341 MG/DL (ref 65–100)

## 2020-03-15 PROCEDURE — 74011250637 HC RX REV CODE- 250/637: Performed by: HOSPITALIST

## 2020-03-15 PROCEDURE — 74011636637 HC RX REV CODE- 636/637: Performed by: INTERNAL MEDICINE

## 2020-03-15 PROCEDURE — 82962 GLUCOSE BLOOD TEST: CPT

## 2020-03-15 PROCEDURE — 74011250636 HC RX REV CODE- 250/636: Performed by: HOSPITALIST

## 2020-03-15 PROCEDURE — 74011250637 HC RX REV CODE- 250/637: Performed by: FAMILY MEDICINE

## 2020-03-15 PROCEDURE — 74011636637 HC RX REV CODE- 636/637: Performed by: FAMILY MEDICINE

## 2020-03-15 PROCEDURE — 74011250637 HC RX REV CODE- 250/637: Performed by: INTERNAL MEDICINE

## 2020-03-15 PROCEDURE — 65270000029 HC RM PRIVATE

## 2020-03-15 RX ADMIN — Medication 1 AMPULE: at 08:34

## 2020-03-15 RX ADMIN — GABAPENTIN 100 MG: 100 CAPSULE ORAL at 21:27

## 2020-03-15 RX ADMIN — INSULIN LISPRO 14 UNITS: 100 INJECTION, SOLUTION INTRAVENOUS; SUBCUTANEOUS at 08:33

## 2020-03-15 RX ADMIN — GABAPENTIN 100 MG: 100 CAPSULE ORAL at 08:34

## 2020-03-15 RX ADMIN — METOPROLOL TARTRATE 12.5 MG: 25 TABLET, FILM COATED ORAL at 08:34

## 2020-03-15 RX ADMIN — INSULIN GLARGINE 25 UNITS: 100 INJECTION, SOLUTION SUBCUTANEOUS at 21:17

## 2020-03-15 RX ADMIN — HEPARIN SODIUM 5000 UNITS: 5000 INJECTION INTRAVENOUS; SUBCUTANEOUS at 05:21

## 2020-03-15 RX ADMIN — ROSUVASTATIN CALCIUM 20 MG: 20 TABLET, COATED ORAL at 21:27

## 2020-03-15 RX ADMIN — HEPARIN SODIUM 5000 UNITS: 5000 INJECTION INTRAVENOUS; SUBCUTANEOUS at 17:05

## 2020-03-15 RX ADMIN — METOPROLOL TARTRATE 12.5 MG: 25 TABLET, FILM COATED ORAL at 21:16

## 2020-03-15 RX ADMIN — CLOPIDOGREL BISULFATE 75 MG: 75 TABLET ORAL at 08:34

## 2020-03-15 RX ADMIN — SENNOSIDES AND DOCUSATE SODIUM 1 TABLET: 8.6; 5 TABLET ORAL at 08:34

## 2020-03-15 RX ADMIN — Medication 1 AMPULE: at 21:16

## 2020-03-15 RX ADMIN — INSULIN LISPRO 2 UNITS: 100 INJECTION, SOLUTION INTRAVENOUS; SUBCUTANEOUS at 17:05

## 2020-03-15 RX ADMIN — INSULIN LISPRO 14 UNITS: 100 INJECTION, SOLUTION INTRAVENOUS; SUBCUTANEOUS at 17:05

## 2020-03-15 RX ADMIN — LOSARTAN POTASSIUM 100 MG: 50 TABLET, FILM COATED ORAL at 08:34

## 2020-03-15 RX ADMIN — INSULIN LISPRO 14 UNITS: 100 INJECTION, SOLUTION INTRAVENOUS; SUBCUTANEOUS at 12:01

## 2020-03-15 RX ADMIN — GUAIFENESIN 600 MG: 600 TABLET ORAL at 21:16

## 2020-03-15 RX ADMIN — Medication 10 ML: at 05:21

## 2020-03-15 RX ADMIN — Medication 10 ML: at 21:16

## 2020-03-15 RX ADMIN — GUAIFENESIN 600 MG: 600 TABLET ORAL at 08:34

## 2020-03-15 RX ADMIN — Medication 10 ML: at 13:39

## 2020-03-15 RX ADMIN — INSULIN LISPRO 2 UNITS: 100 INJECTION, SOLUTION INTRAVENOUS; SUBCUTANEOUS at 21:17

## 2020-03-15 RX ADMIN — INSULIN LISPRO 8 UNITS: 100 INJECTION, SOLUTION INTRAVENOUS; SUBCUTANEOUS at 12:01

## 2020-03-15 RX ADMIN — GABAPENTIN 100 MG: 100 CAPSULE ORAL at 15:57

## 2020-03-15 NOTE — PROGRESS NOTES
Progress Note Patient: Carlito Schwartz MRN: [id number]  SSN: [id number] YOB: 1940  Age: 78 y.o. Sex: female Admit Date: 3/8/2020 LOS: 7 days Subjective:  
 
\"79 yo hx of CAD, HTN, DM type 2, HLD, peripheral neuropathy, CKD stage III, ESBL UTI who was admitted on 3/8/20 for HHS. Glucose 545 on arrival, anion gap 20, bicarb 15. No ketones in urine. Originally placed in ICU and then quickly moved to regular floor. Admits to not using her insulin at home. A1C 12.7. DM management consulted. \" 
 
Pt denies new complaints today. Awaiting insurance approval to [facility]. Review of Systems: 
Pertinent per HPI. Medications: 
Current Facility-Administered Medications Medication Dose Route Frequency  insulin lispro (HUMALOG) injection 14 Units  14 Units SubCUTAneous TIDAC  insulin glargine (LANTUS) injection 25 Units  25 Units SubCUTAneous QHS  guaiFENesin ER (MUCINEX) tablet 600 mg  600 mg Oral Q12H  lip protectant (BLISTEX) ointment 1 Each  1 Each Topical PRN  
 alcohol 62% (NOZIN) nasal  1 Ampule  1 Ampule Topical Q12H  
 benzonatate (TESSALON) capsule 100 mg  100 mg Oral TID PRN  
 clopidogreL (PLAVIX) tablet 75 mg  75 mg Oral DAILY  gabapentin (NEURONTIN) capsule 100 mg  100 mg Oral TID  rOPINIRole (REQUIP) tablet 0.5 mg  0.5 mg Oral QHS PRN  
 rosuvastatin (CRESTOR) tablet 20 mg  20 mg Oral QHS  losartan (COZAAR) tablet 100 mg  100 mg Oral DAILY  sodium chloride (NS) flush 5-40 mL  5-40 mL IntraVENous Q8H  
 sodium chloride (NS) flush 5-40 mL  5-40 mL IntraVENous PRN  
 acetaminophen (TYLENOL) tablet 650 mg  650 mg Oral Q4H PRN  
 HYDROcodone-acetaminophen (NORCO) 5-325 mg per tablet 1 Tab  1 Tab Oral Q6H PRN  
 naloxone (NARCAN) injection 0.4 mg  0.4 mg IntraVENous PRN  
 senna-docusate (PERICOLACE) 8.6-50 mg per tablet 1 Tab  1 Tab Oral DAILY  heparin (porcine) injection 5,000 Units  5,000 Units SubCUTAneous Q12H  metoprolol tartrate (LOPRESSOR) tablet 12.5 mg  12.5 mg Oral Q12H  
 methocarbamoL (ROBAXIN) tablet 500 mg  500 mg Oral TID PRN  
 insulin lispro (HUMALOG) injection   SubCUTAneous AC&HS Objective:  
 
Vitals:  
 03/14/20 1901 03/14/20 2306 03/15/20 0708 03/15/20 1045 BP: 97/61 148/74 150/70 124/69 Pulse: (!) 57 (!) 54 (!) 54 (!) 56 Resp: 19 18 18 18 Temp: 98.2 °F (36.8 °C) 97.8 °F (36.6 °C) 97.7 °F (36.5 °C) 98.1 °F (36.7 °C) SpO2: 91% 99% 91% 94% Weight:      
Height:      
  
 
Physical Exam:  
General: awake, alert, no apparent distress Eyes: anicteric Lungs: Clear to auscultation bilaterally. No rales, wheezes, or rhonchi. Heart: Regular rate and rhythm Abdomen: Soft, nontender, nondistended. Bowel sounds normal. 
Extremities:  No LE edema. Skin: Warm/dry. No rashes or lesions. Lab/Data Review: 
Recent Results (from the past 24 hour(s)) GLUCOSE, POC Collection Time: 03/14/20  4:01 PM  
Result Value Ref Range Glucose (POC) 208 (H) 65 - 100 mg/dL GLUCOSE, POC Collection Time: 03/14/20  8:15 PM  
Result Value Ref Range Glucose (POC) 202 (H) 65 - 100 mg/dL GLUCOSE, POC Collection Time: 03/15/20  7:07 AM  
Result Value Ref Range Glucose (POC) 142 (H) 65 - 100 mg/dL GLUCOSE, POC Collection Time: 03/15/20 10:44 AM  
Result Value Ref Range Glucose (POC) 341 (H) 65 - 100 mg/dL I have reviewed new clinical data. Assessment:  
 
Principal Problem: 
  Diabetes mellitus with hyperosmolarity without hyperglycemic hyperosmolar nonketotic coma ([facility] [address] (11/24/2014) Active Problems: 
  Coronary artery disease involving coronary bypass graft of native heart without angina pectoris (6/15/2015) Uncontrolled type 2 diabetes mellitus with hyperglycemia ([facility] [address] (6/15/2015) Essential hypertension (6/15/2015) HLD (hyperlipidemia) (6/15/2015) Chronic kidney disease, stage III (moderate) (HCC) (6/15/2015) Gastroesophageal reflux disease without esophagitis (6/15/2015) Peripheral neuropathy (6/15/2015) Insomnia (6/15/2015) RLS (restless legs syndrome) (6/15/2015) Generalized weakness (9/16/2019) Dehydration (3/8/2020) Hypothermia (3/8/2020) Metabolic acidosis (4/3/3553) Noncompliance (3/8/2020) Hypoalbuminemia due to protein-calorie malnutrition ([facility] [address] (3/8/2020) Plan:  
 
Continue current plan of care DVT prophylaxis: SQ heparin Disposition: accepted to [id number] [email or web address] , awaiting insurance approval.  Likely DC tomorrow. Signed By: Ozzy Sams DO March 15, 2020

## 2020-03-15 NOTE — PROGRESS NOTES
Reviewed notes for spiritual concerns prior to visit Visit with patient to build rapport with . Calm Encouraged with presence and words of hope Patient demonstrates confidence in the care team. 
Appears to be coping with illness. Staff Janet  C: [phone]  /  [email or web address]

## 2020-03-16 VITALS
HEIGHT: 62 IN | WEIGHT: 162.7 LBS | HEART RATE: 54 BPM | DIASTOLIC BLOOD PRESSURE: 60 MMHG | RESPIRATION RATE: 18 BRPM | OXYGEN SATURATION: 100 % | BODY MASS INDEX: 29.94 KG/M2 | TEMPERATURE: 97.3 F | SYSTOLIC BLOOD PRESSURE: 120 MMHG

## 2020-03-16 LAB
GLUCOSE BLD STRIP.AUTO-MCNC: 195 MG/DL (ref 65–100)
GLUCOSE BLD STRIP.AUTO-MCNC: 205 MG/DL (ref 65–100)
GLUCOSE BLD STRIP.AUTO-MCNC: 86 MG/DL (ref 65–100)

## 2020-03-16 PROCEDURE — 74011636637 HC RX REV CODE- 636/637: Performed by: INTERNAL MEDICINE

## 2020-03-16 PROCEDURE — 74011250637 HC RX REV CODE- 250/637: Performed by: HOSPITALIST

## 2020-03-16 PROCEDURE — 74011250637 HC RX REV CODE- 250/637: Performed by: INTERNAL MEDICINE

## 2020-03-16 PROCEDURE — 74011250637 HC RX REV CODE- 250/637: Performed by: FAMILY MEDICINE

## 2020-03-16 PROCEDURE — 97530 THERAPEUTIC ACTIVITIES: CPT

## 2020-03-16 PROCEDURE — 74011636637 HC RX REV CODE- 636/637: Performed by: FAMILY MEDICINE

## 2020-03-16 PROCEDURE — 97110 THERAPEUTIC EXERCISES: CPT

## 2020-03-16 PROCEDURE — 74011250636 HC RX REV CODE- 250/636: Performed by: HOSPITALIST

## 2020-03-16 PROCEDURE — 82962 GLUCOSE BLOOD TEST: CPT

## 2020-03-16 RX ORDER — METOPROLOL TARTRATE 25 MG/1
12.5 TABLET, FILM COATED ORAL EVERY 12 HOURS
Qty: 60 TAB | Refills: 1 | Status: SHIPPED | OUTPATIENT
Start: 2020-03-16 | End: 2021-01-01

## 2020-03-16 RX ORDER — FLUCONAZOLE 100 MG/1
150 TABLET ORAL DAILY
Status: DISCONTINUED | OUTPATIENT
Start: 2020-03-16 | End: 2020-03-16

## 2020-03-16 RX ADMIN — GABAPENTIN 100 MG: 100 CAPSULE ORAL at 08:35

## 2020-03-16 RX ADMIN — INSULIN LISPRO 14 UNITS: 100 INJECTION, SOLUTION INTRAVENOUS; SUBCUTANEOUS at 11:35

## 2020-03-16 RX ADMIN — SENNOSIDES AND DOCUSATE SODIUM 1 TABLET: 8.6; 5 TABLET ORAL at 08:36

## 2020-03-16 RX ADMIN — INSULIN LISPRO 4 UNITS: 100 INJECTION, SOLUTION INTRAVENOUS; SUBCUTANEOUS at 11:37

## 2020-03-16 RX ADMIN — INSULIN LISPRO 14 UNITS: 100 INJECTION, SOLUTION INTRAVENOUS; SUBCUTANEOUS at 08:33

## 2020-03-16 RX ADMIN — INSULIN LISPRO 2 UNITS: 100 INJECTION, SOLUTION INTRAVENOUS; SUBCUTANEOUS at 08:34

## 2020-03-16 RX ADMIN — HEPARIN SODIUM 5000 UNITS: 5000 INJECTION INTRAVENOUS; SUBCUTANEOUS at 05:16

## 2020-03-16 RX ADMIN — Medication 1 AMPULE: at 08:39

## 2020-03-16 RX ADMIN — GUAIFENESIN 600 MG: 600 TABLET ORAL at 08:35

## 2020-03-16 RX ADMIN — Medication 10 ML: at 05:17

## 2020-03-16 RX ADMIN — GABAPENTIN 100 MG: 100 CAPSULE ORAL at 16:51

## 2020-03-16 RX ADMIN — Medication 10 ML: at 14:16

## 2020-03-16 RX ADMIN — CLOPIDOGREL BISULFATE 75 MG: 75 TABLET ORAL at 08:35

## 2020-03-16 RX ADMIN — LOSARTAN POTASSIUM 100 MG: 50 TABLET, FILM COATED ORAL at 08:36

## 2020-03-16 NOTE — PROGRESS NOTES
Provided pt with snack this morning. Pt rested well throughout this shift. Pt denies pain or further needs at thsi time. Will continue to monitor and give bedside shift report to oncoming day shift RN.

## 2020-03-16 NOTE — PROGRESS NOTES
Interdisciplinary Rounds completed. Nursing, Case Management, and Physician  present. Plan of care reviewed and updated.  
 
Awaiting insurance approval.

## 2020-03-16 NOTE — PROGRESS NOTES
CM was informed by [facility] with [facility], updated therapy notes will be sent to assist with STR. CM awaiting insurance approval. CM continues to follow the patient. Update: CM contacted PT and spoke with Lisa Flanagan, who confirmed the patient no longer needs STR. CM notified [facility], to have pre-cert stopped. CM asked the patient if [facility] services could be beneficial to assist the patient with physical mobility in the home. Patient states at this time services are not needed. CM contacted pt's daughter Trevor Mohan, to inform her, patient will be discharged this day, per MD. Patient's daughter Trevor Mohan was receptive to this information and stated she will be at the hospital around 6pm. CM was receptive to this information. CM continues to follow the patient.

## 2020-03-16 NOTE — DIABETES MGMT
Blood glucose ranged 142-341 yesterday with patient receiving Lantus 25 units and Humalog 54 units. Blood glucose this morning was 195. Per chart review note at [phone], patient had a morning snack. Reviewed patient current regimen: Lantus 25 units, Humalog 14 units with meals, and Humalog SSI. Patient would likely benefit from an increase in prandial insulin if stricter glycemic control is desired. Per chart review patient may possibly discharge to facility today.

## 2020-03-16 NOTE — PROGRESS NOTES
Problem: Mobility Impaired (Adult and Pediatric) Goal: *Acute Goals and Plan of Care (Insert Text) Description LTG: 
(1.)Ms. Zehra Cedillo will move from supine to sit and sit to supine , scoot up and down and roll side to side in bed with MODIFIED INDEPENDENCE within 7 treatment day(s). (2.)Ms. Zehra Cedillo will transfer from bed to chair and chair to bed with MODIFIED INDEPENDENCE using the least restrictive device within 7 treatment day(s). (3.)Ms. Zehra Cedillo will ambulate with MODIFIED INDEPENDENCE for 90+ feet with the least restrictive device within 7 treatment day(s). (4.)Ms. Zehra Cedillo will perform standing static and dynamic balance activities x 15 minutes with SUPERVISION to improve safety within 7 treatment day(s). ________________________________________________________________________________________________ Outcome: Progressing Towards Goal 
  
PHYSICAL THERAPY: Daily Note and AM 3/16/2020 INPATIENT: PT Visit Days : 5 Payor: Kike Chairez / Plan: [address] / Product Type: [employer] Care Medicare /   
  
NAME/AGE/GENDER: Huma Garcia is a 78 y.o. female PRIMARY DIAGNOSIS: Diabetic ketoacidosis ([facility] [E11.10] Metabolic acidosis [W31.7] Dehydration [E86.0] Noncompliance [Z91.19] Hypothermia [T68. XXXA] Diabetes mellitus with hyperosmolarity without hyperglycemic hyperosmolar nonketotic coma ([facility] Diabetes mellitus with hyperosmolarity without hyperglycemic hyperosmolar nonketotic coma ([facility] ICD-10: Treatment Diagnosis:  
 · Generalized Muscle Weakness (M62.81) · Difficulty in walking, Not elsewhere classified (R26.2) Precaution/Allergies: 
Sulfa (sulfonamide antibiotics) ASSESSMENT:  
 
Ms. Zehra Cedillo is a 78 y.o. female in the hospital for the above. Patient was up in chair on contact and agreeable to PT. Patient transfers to standing with CGA and cues for hand placement/technique.  Once standing patient ambulated 500' with rolling walker, CGA and cues for posture/sequencing. Patient returns to the bathroom to urinate. Returned to chair patient performed therapeutic strengthening exercises to B LE as listed below to improve endurance, balance and functional strength for transfers, gait and overall mobility. Patient required cues to perform exercises correctly. Patient showed good tolerance for activity. Overall slow, steady progress towards physical therapy goals. Patient's goals listed above are still appropriate. Will continue skilled PT to address remaining deficits. This section established at most recent assessment PROBLEM LIST (Impairments causing functional limitations): 1. Decreased Strength 2. Decreased ADL/Functional Activities 3. Decreased Transfer Abilities 4. Decreased Ambulation Ability/Technique 5. Decreased Balance 6. Decreased Activity Tolerance INTERVENTIONS PLANNED: (Benefits and precautions of physical therapy have been discussed with the patient.) 1. Balance Exercise 2. Bed Mobility 3. Family Education 4. Gait Training 5. Home Exercise Program (HEP) 6. Therapeutic Activites 7. Therapeutic Exercise/Strengthening 8. Transfer Training TREATMENT PLAN: Frequency/Duration: 3 times a week for duration of hospital stay Rehabilitation Potential For Stated Goals: Good REHAB RECOMMENDATIONS (at time of discharge pending progress):   
Placement: It is my opinion, based on this patient's performance to date, that Ms. Valerie Covington may benefit from being discharged with NO further skilled therapy due to a proven ability to function at baseline. Equipment:  
? None at this time HISTORY:  
History of Present Injury/Illness (Reason for Referral): 
dehydration Past Medical History/Comorbidities: Ms. Valerie Covington  has a past medical history of Acute cystitis without hematuria (1/30/2019), CAD (coronary artery disease), DM2 (diabetes mellitus, type 2) ([facility] Gout, HLD (hyperlipidemia), HTN (hypertension), and Peripheral neuropathy. Ms. Sirena Banda  has a past surgical history that includes hx tubal ligation; hx coronary artery bypass graft; pr cardiac surg procedure unlist; and hx cholecystectomy. Social History/Living Environment:  
Home Environment: Apartment # Steps to Enter: 4 Rails to Enter: Yes One/Two Story Residence: One story Living Alone: Yes Support Systems: Child(marga) Patient Expects to be Discharged to[de-identified] Rehabilitation facility Current DME Used/Available at Home: [place], straight, Shower chair, Walker, rollator Tub or Shower Type: Tub/Shower combination Prior Level of Function/Work/Activity: 
Was living in mobile home with one of her daughters. PTA ambulating with rollator or SPC. Number of Personal Factors/Comorbidities that affect the Plan of Care: 1-2: MODERATE COMPLEXITY EXAMINATION:  
Most Recent Physical Functioning:  
Gross Assessment: 
  
         
  
Posture: 
  
Balance: 
Sitting: Intact Standing: Impaired Standing - Static: Fair Standing - Dynamic : Fair Bed Mobility: 
  
Wheelchair Mobility: 
  
Transfers: 
Sit to Stand: Stand-by assistance Stand to Sit: Stand-by assistance Gait: 
  
Speed/Noemi: Shuffled; Slow Step Length: Left shortened;Right shortened Gait Abnormalities: Decreased step clearance;Shuffling gait Distance (ft): 500 Feet (ft) Assistive Device: Walker, rolling Ambulation - Level of Assistance: Stand-by assistance Interventions: Safety awareness training;Verbal cues Body Structures Involved: 1. Metabolic 2. Endocrine 3. Muscles Body Functions Affected: 1. Neuromusculoskeletal 
2. Movement Related 3. Metobolic/Endocrine Activities and Participation Affected: 1. General Tasks and Demands 2. Mobility 3. Domestic Life 4. Community, Social and [place] Number of elements that affect the Plan of Care: 4+: HIGH COMPLEXITY CLINICAL PRESENTATION:  
 Presentation: Stable and uncomplicated: LOW COMPLEXITY CLINICAL DECISION [employer] [place] [address] AM-PAC 6 Clicks Basic Mobility Inpatient Short Form How much difficulty does the patient currently have. .. Unable A Lot A Little None 1. Turning over in bed (including adjusting bedclothes, sheets and blankets)? [] 1   [] 2   [] 3   [x] 4  
2. Sitting down on and standing up from a chair with arms ( e.g., wheelchair, bedside commode, etc.)   [] 1   [] 2   [x] 3   [] 4  
3. Moving from lying on back to sitting on the side of the bed? [] 1   [] 2   [] 3   [x] 4 How much help from another person does the patient currently need. .. Total A Lot A Little None 4. Moving to and from a bed to a chair (including a wheelchair)? [] 1   [] 2   [x] 3   [] 4  
5. Need to walk in hospital room? [] 1   [] 2   [] 3   [x] 4  
6. Climbing 3-5 steps with a railing? [] 1   [] 2   [x] 3   [] 4  
© , Trustees of [address] [place] [address], under license to [employer]. All rights reserved Score:  Initial: 21 Most Recent: X (Date: -- ) Interpretation of Tool:  Represents activities that are increasingly more difficult (i.e. Bed mobility, Transfers, Gait). Medical Necessity:    
· Patient demonstrates · good ·  rehab potential due to higher previous functional level. Reason for Services/Other Comments: 
· Patient continues to require skilled intervention due to · Decreased balance and functional mobility · . Use of outcome tool(s) and clinical judgement create a POC that gives a: Clear prediction of patient's progress: LOW COMPLEXITY  
  
 
 
 
TREATMENT:  
(In addition to Assessment/Re-Assessment sessions the following treatments were rendered) Pre-treatment Symptoms/Complaints: none Pain: Initial:  
Pain Intensity 1: 0 0 Post Session:  0 Therapeutic Activity: (    24  Minutes):   Therapeutic activities including bed mobility training, transfer training from various surface heights, ambulation on level ground, static/dynamic standing balance, scooting, posture training, instruction in sequencing with rolling walker, and patient education to improve mobility, strength and balance. Required moderate Safety awareness training;Verbal cues to promote static and dynamic balance in standing and promote coordination of bilateral, lower extremity(s). Therapeutic Exercise: (   14 Minutes):  Exercises per grid below to improve mobility, strength and balance. Required moderate visual and verbal cues to promote proper body alignment, promote proper body posture and promote proper body mechanics. Progressed range, repetitions and complexity of movement as indicated. Date: 
3/11/20 Date: 
3/16 Date: 
  
ACTIVITY/EXERCISE AM PM AM PM AM PM  
Ambulation:           Distance Device Duration Seated Heel Raises 2x15B A  X 20 B Seated Toe Raises 2x15B A  X 20 B Seated Long Arc Quads 2x15B A  X 20 B Seated Marching 2x15B A  X 20 B Seated Hip Abduction   X 20 B     
        
B = bilateral; AA = active assistive; A = active; P = passive Braces/Orthotics/Lines/Etc:  
· None Treatment/Session Assessment:   
· Response to Treatment: See above · Interdisciplinary Collaboration:  
o Physical Therapy Assistant 
o Registered Nurse · After treatment position/precautions:  
o Up in chair 
o Bed alarm/tab alert on 
o Bed/Chair-wheels locked 
o Call light within reach 
o RN notified · Compliance with Program/Exercises: Will assess as treatment progresses · Recommendations/Intent for next treatment session: \"Next visit will focus on advancements to more challenging activities and reduction in assistance provided\". Total Treatment Duration: PT Patient Time In/Time Out Time In: 5601 Time Out: 1118 Kelly Tony, PTA

## 2020-03-16 NOTE — DIABETES MGMT
Patient seen for follow up diabetes education. Pt in recliner, no visitors present. Educated patient regarding current basal/bolus regimen of Lantus 25 units nightly and Humalog 14 units with meals and SSI including type of insulin, timing with meals, onset, duration of effect, and peak of insulin dose. Educated patient on the differences between prandial insulin and sliding scale insulin. Instructed patient to always seek guidance from their primary care provider about adjusting their insulin doses and not to adjust them on their own as this could negatively impact their glycemic control or result in hypoglycemia. Discussed overcoming barriers to compliance. Patient has visual difficulties and has her aide dial up her insulin. Discussed ways to remember to take insulin. Also discussed CGM therapy and talking glucometer options and the use of a standing magnifying glass to check FSBS. Plan is for patient to discharge to rehab at this time. Reviewed the basics of a diabetic diet with patient. Patient verbalizes understanding and voices no further questions regarding diabetes management at this time.

## 2020-03-16 NOTE — DISCHARGE SUMMARY
Discharge Summary Patient: Darius Montez MRN: [id number]  SSN: [id number] YOB: 1940  Age: 78 y.o. Sex: female Admit Date: 3/8/2020 Discharge Date: 3/16/2020 Admission Diagnoses: Diabetic ketoacidosis ([facility] [E11.10] Metabolic acidosis [Q74.4] Dehydration [E86.0] Noncompliance [Z91.19] Hypothermia [T68. Frankey Monks Discharge Diagnoses:  
Problem List as of 3/16/2020 Date Reviewed: 8/10/2019 Codes Class Noted - Resolved Dehydration ICD-10-CM: E86.0 ICD-9-CM: 276.51  3/8/2020 - Present Diabetic ketoacidosis ([facility] ICD-10-CM: E11.10 ICD-9-CM: 250.10  3/8/2020 - Present Hypothermia ICD-10-CM: T68. Zak Celeste ICD-9-CM: 991.6  3/8/2020 - Present Metabolic acidosis XFS-24-IX: E87.2 ICD-9-CM: 276.2  3/8/2020 - Present Noncompliance ICD-10-CM: Z91.19 ICD-9-CM: V15.81  3/8/2020 - Present Hypoalbuminemia due to protein-calorie malnutrition [facility]) ICD-10-CM: E46 
ICD-9-CM: 263.9  3/8/2020 - Present ARF (acute renal failure) (HCC) ICD-10-CM: N17.9 ICD-9-CM: 584.9  9/17/2019 - Present Generalized weakness ICD-10-CM: R53.1 ICD-9-CM: 780.79  9/16/2019 - Present Decreased oral intake ICD-10-CM: R63.8 ICD-9-CM: 783.9  9/16/2019 - Present Acute pancreatitis ICD-10-CM: K85.90 ICD-9-CM: 432.5  8/12/2019 - Present Gram-negative bacteremia ICD-10-CM: R78.81 ICD-9-CM: 790.7, 041.85  8/9/2019 - Present Acute renal failure (ARF) (HCC) ICD-10-CM: N17.9 ICD-9-CM: 584.9  8/8/2019 - Present Elevated liver function tests ICD-10-CM: R94.5 ICD-9-CM: 790.6  8/8/2019 - Present Anemia ICD-10-CM: D64.9 ICD-9-CM: 285.9  8/8/2019 - Present Right lower quadrant abdominal pain ICD-10-CM: R10.31 ICD-9-CM: 789.03  8/8/2019 - Present Coronary artery disease involving coronary bypass graft of native heart without angina pectoris (Chronic) ICD-10-CM: L30.465 ICD-9-CM: 414.05  6/15/2015 - Present Uncontrolled type 2 diabetes mellitus with hyperglycemia (HCC) (Chronic) ICD-10-CM: E11.65 ICD-9-CM: 250.02  6/15/2015 - Present Essential hypertension (Chronic) ICD-10-CM: I10 
ICD-9-CM: 401.9  6/15/2015 - Present HLD (hyperlipidemia) (Chronic) ICD-10-CM: X05.2 ICD-9-CM: 272.4  6/15/2015 - Present Chronic kidney disease, stage III (moderate) (HCC) (Chronic) ICD-10-CM: N18.3 ICD-9-CM: 585.3  6/15/2015 - Present Osteopenia (Chronic) ICD-10-CM: M85.80 ICD-9-CM: 733.90  6/15/2015 - Present Vitamin D deficiency (Chronic) ICD-10-CM: E55.9 ICD-9-CM: 268.9  6/15/2015 - Present Gastroesophageal reflux disease without esophagitis (Chronic) ICD-10-CM: K21.9 ICD-9-CM: 530.81  6/15/2015 - Present Other allergic rhinitis (Chronic) ICD-10-CM: J30.89 ICD-9-CM: 477.8  6/15/2015 - Present Peripheral neuropathy (Chronic) ICD-10-CM: G62.9 ICD-9-CM: 356.9  6/15/2015 - Present Primary osteoarthritis involving multiple joints (Chronic) ICD-10-CM: M15.0 ICD-9-CM: 715.09  6/15/2015 - Present Insomnia ICD-10-CM: G47.00 ICD-9-CM: 780.52  6/15/2015 - Present  
   
 RLS (restless legs syndrome) (Chronic) ICD-10-CM: S32.02 ICD-9-CM: 333.94  6/15/2015 - Present * (Principal) Diabetes mellitus with hyperosmolarity without hyperglycemic hyperosmolar nonketotic coma (HCC) (Chronic) ICD-10-CM: E11.00 ICD-9-CM: 250.20  11/24/2014 - Present RESOLVED: Septic shock (HCC) ICD-10-CM: A41.9, R65.21 ICD-9-CM: 038.9, 785.52, 995.92  8/8/2019 - 8/12/2019 RESOLVED: Seizure-like activity (HCC) ICD-10-CM: R56.9 ICD-9-CM: 780.39  2/1/2019 - 8/8/2019 RESOLVED: Acute renal failure superimposed on stage 3 chronic kidney disease (HCC) ICD-10-CM: N17.9, N18.3 ICD-9-CM: 584.9, 585.3  1/31/2019 - 2/3/2019 RESOLVED: Hyperglycemia due to type 2 diabetes mellitus ([facility] ICD-10-CM: E11.65 ICD-9-CM: 250.00  1/30/2019 - 8/8/2019 RESOLVED: Acute metabolic encephalopathy XRH-17-SAUCEDO: G93.41 
ICD-9-CM: 348.31  1/30/2019 - 8/8/2019 RESOLVED: CAD (coronary artery disease) (Chronic) ICD-10-CM: I25.10 ICD-9-CM: 414.00  11/24/2014 - 6/15/2015 RESOLVED: CAD (coronary artery disease) ICD-10-CM: I25.10 ICD-9-CM: 414.00  4/21/2014 - 11/24/2014 RESOLVED: DM2 (diabetes mellitus, type 2) (HCC) ICD-10-CM: E11.9 ICD-9-CM: 250.00  4/21/2014 - 6/15/2015 RESOLVED: HTN (hypertension) ICD-10-CM: I10 
ICD-9-CM: 401.9  4/21/2014 - 6/15/2015 RESOLVED: HLD (hyperlipidemia) ICD-10-CM: A97.1 ICD-9-CM: 272.4  4/21/2014 - 6/15/2015 RESOLVED: GERD (gastroesophageal reflux disease) ICD-10-CM: K21.9 ICD-9-CM: 530.81  4/21/2014 - 6/15/2015 RESOLVED: Allergic rhinitis ICD-10-CM: J30.9 ICD-9-CM: 477.9  4/21/2014 - 6/15/2015 RESOLVED: Peripheral neuropathy ICD-10-CM: G62.9 ICD-9-CM: 356.9  4/21/2014 - 6/15/2015 RESOLVED: Osteoarthritis of multiple joints ICD-10-CM: M15.9 ICD-9-CM: 715.89  4/21/2014 - 6/15/2015 RESOLVED: Insomnia ICD-10-CM: G47.00 ICD-9-CM: 780.52  4/21/2014 - 6/15/2015 RESOLVED: RLS (restless legs syndrome) ICD-10-CM: G25.81 ICD-9-CM: 333.94  4/21/2014 - 6/15/2015 Discharge Condition: Stable Hospital Course:  
47-year-old female with chronic noncompliance of antihyperglycemic regimen presenting to the hospital with HHS and no signs of DKA. Patient initially admitted to the intensive care unit on insulin drip but was quickly weaned off of insulin drip and transferred to the regular nursing floor. No signs of infection. Patient was seen by diabetes educator who stressed compliance with medication regimen which the patient states \"I was just skipping, let us put it that way\". It was also stressed that the patient should adhere to a diabetic diet. Hemoglobin A1c 12.7.   Urine culture growing Meenakshi glabrata which is likely contaminant given the fact that the patient has no urinary symptoms or vaginal symptoms. All of the other chronic medical problems were stable throughout the patient's stay. Patient discharged home with home health in stable condition. Discharge instructions discussed at length with patient and she voiced understanding and agreement. Patient given return precautions and she voiced understanding. Patient is to follow-up with her primary care physician before the end of the week to further titrate the patient's antihyperglycemic regimen. Physical Exam:  
General: Elderly black female, sitting up in chair, no acute distress HEENT: NCAT, moist mucous membranes Skin: No rash noted Cardio: RRR, normal S1/S2, no rubs, no gallops, no murmurs Pulm: Non labored respirations on room air, LCAB, no wheezing, no rales, no rhonchi GI: Soft, Nt, Nd, Nml bowel sounds, no masses noted Extremity: Atraumatic, no deformities, no edema Neuro: Alert, oriented, moving all extremities, no focal deficits noted Psych: Pleasant, cooperative, normal range of affect Consults: None Significant Diagnostic Studies: CHEST X-RAY, one view. 
  
HISTORY:  Hypoxia. 
  
TECHNIQUE:  AP portable  view. 
  
COMPARISON: September 2019 
  
FINDINGS:    
*The lungs: are clear. *The heart size: is borderline *The costophrenic angles: are sharp. *The pulmonary vasculature: is unremarkable. *Included portion of the upper abdomen: is unremarkable. *Bones: There are sternal wires. *Other: None. 
  
IMPRESSION IMPRESSION:  Negative for acute change Disposition: Home with home health Discharge Medications:  
Current Discharge Medication List  
  
START taking these medications Details  
metoprolol tartrate (LOPRESSOR) 25 mg tablet Take 0.5 Tabs by mouth every twelve (12) hours. Qty: 60 Tab, Refills: 1 CONTINUE these medications which have NOT CHANGED  Details  
insulin lispro (HUMALOG) 100 unit/mL injection 15 Units by SubCUTAneous route Before breakfast, lunch, and dinner. Qty: 1 Vial, Refills: 0 Insulin Needles, Disposable, 31 gauge x 5/16\" ndle by SubCUTAneous route Before breakfast, lunch, dinner and at bedtime. Qty: 1 Package, Refills: 11  
  
clopidogrel (PLAVIX) 75 mg tab Take 75 mg by mouth. insulin glargine (LANTUS) 100 unit/mL injection 50 Units by SubCUTAneous route nightly. Qty: 1 Vial, Refills: 0 Blood-Glucose Meter (ONETOUCH ULTRAMINI) monitoring kit Use as directed Qty: 1 Kit, Refills: 0 Comments: Dx: 250.00 insulin based  
  
glucose blood VI test strips (ONETOUCH ULTRA TEST) strip Test 4 times daily as directed Qty: 100 Strip, Refills: 11 Comments: Dx: 250.00 insulin based Lancets (ONETOUCH ULTRASOFT LANCETS) misc Test 4 times daily as directed Qty: 100 Each, Refills: 11 Comments: Dx: 250.00 insulin based  
  
losartan (COZAAR) 100 mg tablet Take  by mouth daily. Associated Diagnoses: CAD (coronary artery disease); HTN (hypertension)  
  
gabapentin (NEURONTIN) 100 mg capsule Take 1 Cap by mouth three (3) times daily. Qty: 30 Cap, Refills: 5 Associated Diagnoses: Peripheral neuropathy  
  
rosuvastatin (CRESTOR) 20 mg tablet Take 1 Tab by mouth nightly. Qty: 90 Tab, Refills: 1 Associated Diagnoses: CAD (coronary artery disease); HLD (hyperlipidemia) rOPINIRole (REQUIP) 0.5 mg tablet Take 1 Tab by mouth nightly as needed. Qty: 90 Tab, Refills: 1 Associated Diagnoses: RLS (restless legs syndrome) STOP taking these medications  
  
 atorvastatin (LIPITOR) 10 mg tablet Comments:  
Reason for Stopping:   
   
  
 
 
Activity: Activity as tolerated Diet: Cardiac Diet and Diabetic Diet Wound Care: None needed Follow-up Appointments Procedures  FOLLOW UP VISIT Appointment in: Other (Specify) PCP this week PCP this week Standing Status:   Standing Number of Occurrences:   1 Order Specific Question:   Appointment in Answer: Other (Specify) Signed By: Neha Tena MD   
 March 16, 2020

## 2020-03-16 NOTE — PROGRESS NOTES
Nutrition Assessment for:  
Length of Stay Assessment: Admitted with uncontrolled DM. PMH remarkable for CAD, HTN, DM type 2, HLD, peripheral neuropathy, CKD stage III. Pt reports pta limited regard to her nutrition selections and diabetes medication management. CDE following during this admission. Pt references nutrition materials provided in packet from CDE. She has appropriate questions regarding meal prep and amounts of foods to consume. She initially indicates she eats a limited number of foods but upon further discussion she names more foods she enjoys increasing the number of vegetables that she includes in her food selections. DIET DIABETIC CONSISTENT CARB Regular Recorded meals 100%, pt confirms she is eating % of her meals. Anthropometrics: 
Height: 5' 2\" (157.5 cm), Weight: 73.8 kg (162 lb 11.2 oz), Weight Source: Bed, Body mass index is 29.76 kg/m². BMI class of overweight. Macronutrient needs: (using IBW (Ideal body weight) 50 kg) EER: 5378-4025 kcal/day (30-35 kcal/kg) EPR: 50-60 g/day (1-1.2 g/kg) Nutrition Diagnosis: 
Food and nutrition related knowledge deficit related to diabetes self care as evidenced by pt with limited exposure to information. .  
 
 
Nutrition Intervention: 
Meals and snacks: Continue current diet. Nutrition education: Educated pt on consistetn cho diet. Covered cho sources, portions, meal prep techniques and meal spacing/balance. Provided DM My Plate handout and simple meal plan. Coordination of nutrition care by a Nutrition Professional: Discussed with Fabienne Palacios RN. Discharge Nutrition Plan: Too soon to determine. [place], [address] [place] [address], [facility]

## 2020-03-17 NOTE — PROGRESS NOTES
Pt to discharge home on 3/16/2020 with no needs voiced at this time. Please consult or notify CM if any needs shall arise. Care Management Interventions PCP Verified by CM: Yes Mode of Transport at Discharge: Other (see comment) Transition of Care Consult (CM Consult): Discharge Planning Discharge Durable Medical Equipment: No(Pt confirmed rollator walker, cane, and wheelchair. ) Physical Therapy Consult: Yes Occupational Therapy Consult: Yes Speech Therapy Consult: No 
Current Support Network: Other(The pt states she was living in a senior complex. ) Confirm Follow Up Transport: Family(The pt's family and aide assist with transportation. ) The Plan for Transition of Care is Related to the Following Treatment Goals : The pt to obtain care to become medically stable and the patient to discharge to her relative's home with a safe transition. The Patient and/or Patient Representative was Provided with a Choice of Provider and Agrees with the Discharge Plan?: Yes Name of the Patient Representative Who was Provided with a Choice of Provider and Agrees with the Discharge Plan: Sybil Alanis Freedom of Choice List was Provided with Basic Dialogue that Supports the Patient's Individualized Plan of Care/Goals, Treatment Preferences and Shares the Quality Data Associated with the Providers?: Yes [employer] Information Provided?: No 
Discharge Location Discharge Placement: Other:(Relative's Home- Patient's daughter. )

## 2020-03-18 ENCOUNTER — PATIENT OUTREACH (OUTPATIENT)
Dept: CASE MANAGEMENT | Age: 80
End: 2020-03-18

## 2020-03-19 NOTE — PROGRESS NOTES
COVID-19 Screening Initial Follow-up Note Patient contacted regarding COVID-19  risk. Care Transition Nurse/ Ambulatory Care Manager contacted the patient by telephone to perform post discharge assessment. Verified name and  with patient as identifiers. Provided introduction to self, and explanation of the CTN/ACM role, and reason for call due to risk factors for infection and/or exposure to COVID-19. Symptoms reviewed with patient who verbalized the following symptoms:  
Fever no Fatigue no Pain or aching joints no Cough no Shortness of breath no Confusion or unusual change in mental status no Chills or shaking no Sweating no Fast heart rate no Fast breathing no Dizziness/lightheadedness no Less urine output no Cold, clammy, and pale skin no Low body temperature no Due to onset/worsening of new symptoms, encounter routed to provider for escalation. Patient has following risk factors of: immunocompromised CTN/ACM reviewed discharge instructions, medical action plan and red flags such as increased shortness of breath, increasing fever and signs of decompensation with patient who verbalized understanding. Discussed exposure protocols and quarantine with CDC Guidelines What to do if you are sick with coronavirus disease 2019 Patient who was given an opportunity for questions and concerns. The parent agrees to contact the local health department and PCP office for questions related to their healthcare. CTN provided contact information for future reference. Reviewed and educated patient on any new and changed medications related to discharge diagnosis Plan for follow-up call in 5-7 days based on severity of symptoms and risk factorsThis note will not be viewable in "[employer]t.

## 2020-04-06 ENCOUNTER — PATIENT OUTREACH (OUTPATIENT)
Dept: CASE MANAGEMENT | Age: 80
End: 2020-04-06

## 2020-04-06 NOTE — PROGRESS NOTES
Patient resolved from Transition of Care episode on 4/6/2020 Patient/family has been provided the following resources and education related to COVID-19:  
           
          Signs, symptoms and red flags related to COVID-19 CDC exposure and quarantine guidelines Conduit exposure contact - [phone] Contact for their local Department of Health Patient currently reports that the following symptoms have improved:   
no new/worsening symptoms No further outreach scheduled with this CTN. Episode of Care resolved. Patient has this CTN contact information if future needs arise.

## 2020-12-24 PROBLEM — E66.01 MORBID OBESITY (HCC): Status: ACTIVE | Noted: 2020-01-01

## 2020-12-24 PROBLEM — J80 ACUTE RESPIRATORY DISTRESS SYNDROME (ARDS) DUE TO SEVERE ACUTE RESPIRATORY SYNDROME CORONAVIRUS 2 (SARS-COV-2) (HCC): Status: ACTIVE | Noted: 2020-01-01

## 2020-12-24 PROBLEM — J96.01 ACUTE HYPOXEMIC RESPIRATORY FAILURE (HCC): Status: ACTIVE | Noted: 2020-01-01

## 2020-12-24 PROBLEM — N18.9 ACUTE ON CHRONIC RENAL FAILURE (HCC): Status: ACTIVE | Noted: 2019-09-17

## 2020-12-24 PROBLEM — U07.1 COVID-19: Status: ACTIVE | Noted: 2020-01-01

## 2020-12-24 PROBLEM — U07.1 ACUTE RESPIRATORY DISTRESS SYNDROME (ARDS) DUE TO SEVERE ACUTE RESPIRATORY SYNDROME CORONAVIRUS 2 (SARS-COV-2) (HCC): Status: ACTIVE | Noted: 2020-01-01

## 2020-12-24 NOTE — H&P
HISTORY AND PHYSICAL Kate Courtney 12/24/2020 Date of Admission:  12/24/2020 The patient's chart is reviewed and the patient is discussed with the staff. Subjective:  
 
Patient is a 78 y.o.  female presents with hypoxemia. Patient has DM/CAD/HTN/CKD and reported came in with dyspnea. Saturation were 44% per EMS and in ER was put on BIPAP since did not improve with NC. On 100% BIPAP and saturation only 93%. She reported did see a relative who had covid recently. Here the rapid test was positive. ABG noted pH 7.31/41/70 on BIPAP 100%. CXR with diffuse infiltrates. Also noted worsening kidney function. Tried to call family and only able to leave a message to call back here. Review of Systems 
+dsypnea. Limited since cannot hear clearly thru BIPAP Patient Active Problem List  
Diagnosis Code  Diabetes mellitus with hyperosmolarity without hyperglycemic hyperosmolar nonketotic coma ([facility] [address] E11.00  Coronary artery disease involving coronary bypass graft of native heart without angina pectoris I25.810  
 Uncontrolled type 2 diabetes mellitus with hyperglycemia (HCC) E11.65  
 Essential hypertension I10  
 HLD (hyperlipidemia) E78.5  Chronic kidney disease, stage III (moderate) N18.30  
 Osteopenia M85.80  Vitamin D deficiency E55.9  Gastroesophageal reflux disease without esophagitis K21.9  Other allergic rhinitis J30.89  Peripheral neuropathy G62.9  
 Primary osteoarthritis involving multiple joints M89.49  
 Insomnia G47.00  RLS (restless legs syndrome) G25.81  
 Acute renal failure (ARF) (HCC) N17.9  Elevated liver function tests R79.89  
 Anemia D64.9  Right lower quadrant abdominal pain R10.31  
 Gram-negative bacteremia R78.81  
 Acute pancreatitis K85.90  Generalized weakness R53.1  Decreased oral intake R63.8  Acute on chronic renal failure (HCC) N17.9, N18.9  Dehydration E86.0  Diabetic ketoacidosis ([facility]) E11.10  Hypothermia T68. [place] Blinks  Metabolic acidosis R13.2  Noncompliance Z91.19  
 Hypoalbuminemia due to protein-calorie malnutrition ([facility] E88.09, E46  
 COVID-19 U07.1  Acute respiratory distress syndrome (ARDS) due to severe acute respiratory syndrome coronavirus 2 (SARS-CoV-2) ([facility]) U07.1, J80  Acute hypoxemic respiratory failure ([facility]) J96.01  
 Morbid obesity ([facility]) E66.01 Prior to Admission Medications Prescriptions Last Dose Informant Patient Reported? Taking? Blood-Glucose Meter (ONETOUCH ULTRAMINI) monitoring kit   No No  
Sig: Use as directed Insulin Needles, Disposable, 31 gauge x 5/16\" ndle   No No  
Sig: by SubCUTAneous route Before breakfast, lunch, dinner and at bedtime. Lancets (ONETOUCH ULTRASOFT LANCETS) misc   No No  
Sig: Test 4 times daily as directed  
aspirin delayed-release 81 mg tablet   Yes No  
Sig: Take  by mouth daily. atorvastatin (LIPITOR) 40 mg tablet   Yes No  
Sig: Take 40 mg by mouth. clopidogrel (PLAVIX) 75 mg tab   Yes No  
Sig: Take 75 mg by mouth.  
gabapentin (NEURONTIN) 100 mg capsule   No No  
Sig: Take 1 Cap by mouth three (3) times daily. Patient taking differently: Take 100 mg by mouth two (2) times a day. glucose blood VI test strips (ONETOUCH ULTRA TEST) strip   No No  
Sig: Test 4 times daily as directed  
insulin glargine (LANTUS) 100 unit/mL injection   No No  
Si Units by SubCUTAneous route nightly. insulin lispro (HUMALOG) 100 unit/mL injection   No No  
Sig: 15 Units by SubCUTAneous route Before breakfast, lunch, and dinner. losartan (COZAAR) 100 mg tablet   Yes No  
Sig: Take  by mouth daily. metoprolol tartrate (LOPRESSOR) 25 mg tablet   No No  
Sig: Take 0.5 Tabs by mouth every twelve (12) hours. rOPINIRole (REQUIP) 0.5 mg tablet   No No  
Sig: Take 1 Tab by mouth nightly as needed. rosuvastatin (CRESTOR) 20 mg tablet   No No  
Sig: Take 1 Tab by mouth nightly. Facility-Administered Medications: None Past Medical History:  
Diagnosis Date  Acute cystitis without hematuria 1/30/2019  CAD (coronary artery disease)  DM2 (diabetes mellitus, type 2) ([facility] [address]  Gout  HLD (hyperlipidemia)  HTN (hypertension)  Peripheral neuropathy Past Surgical History:  
Procedure Laterality Date  CARDIAC SURG PROCEDURE UNLIST    
 stents x 3 2009  HX CHOLECYSTECTOMY jennifer in 1964  HX CORONARY ARTERY BYPASS GRAFT    
 x3  
 HX TUBAL LIGATION Social History Socioeconomic History  Marital status: SINGLE Spouse name: Not on file  Number of children: Not on file  Years of education: Not on file  Highest education level: Not on file Occupational History  Occupation: retired  Social Needs  Financial resource strain: Not on file  Food insecurity Worry: Not on file Inability: Not on file  Transportation needs Medical: Not on file Non-medical: Not on file Tobacco Use  Smoking status: Never Smoker  Smokeless tobacco: Never Used Substance and Sexual Activity  Alcohol use: Yes Comment: socially  Drug use: No  
 Sexual activity: Not on file Lifestyle  Physical activity Days per week: Not on file Minutes per session: Not on file  Stress: Not on file Relationships  Social connections Talks on phone: Not on file Gets together: Not on file Attends Baptism service: Not on file Active member of club or organization: Not on file Attends meetings of clubs or organizations: Not on file Relationship status: Not on file  Intimate partner violence Fear of current or ex partner: Not on file Emotionally abused: Not on file Physically abused: Not on file Forced sexual activity: Not on file Other Topics Concern  Not on file Social History Narrative Lives alone but has a caregiver who helps several hours per day, several days per week Family History Problem Relation Age of Onset  Hypertension Mother  Cancer Mother  Diabetes Mother  Heart Disease Mother Allergies Allergen Reactions  Sulfa (Sulfonamide Antibiotics) Swelling Current Facility-Administered Medications Medication Dose Route Frequency  cefTRIAXone (ROCEPHIN) 1 g in 0.9% sodium chloride (MBP/ADV) 50 mL MBP  1 g IntraVENous NOW  azithromycin (ZITHROMAX) 500 mg in 0.9% sodium chloride 250 mL (VIAL-MATE)  500 mg IntraVENous NOW  sodium chloride 0.9 % bolus infusion 1,000 mL  1,000 mL IntraVENous ONCE Current Outpatient Medications Medication Sig  
 atorvastatin (LIPITOR) 40 mg tablet Take 40 mg by mouth.  aspirin delayed-release 81 mg tablet Take  by mouth daily.  metoprolol tartrate (LOPRESSOR) 25 mg tablet Take 0.5 Tabs by mouth every twelve (12) hours.  insulin lispro (HUMALOG) 100 unit/mL injection 15 Units by SubCUTAneous route Before breakfast, lunch, and dinner.  Insulin Needles, Disposable, 31 gauge x 5/16\" ndle by SubCUTAneous route Before breakfast, lunch, dinner and at bedtime.  clopidogrel (PLAVIX) 75 mg tab Take 75 mg by mouth.  insulin glargine (LANTUS) 100 unit/mL injection 50 Units by SubCUTAneous route nightly.  Blood-Glucose Meter (ONETOUCH ULTRAMINI) monitoring kit Use as directed  glucose blood VI test strips (ONETOUCH ULTRA TEST) strip Test 4 times daily as directed  Lancets (ONETOUCH ULTRASOFT LANCETS) misc Test 4 times daily as directed  losartan (COZAAR) 100 mg tablet Take  by mouth daily.  rosuvastatin (CRESTOR) 20 mg tablet Take 1 Tab by mouth nightly.  gabapentin (NEURONTIN) 100 mg capsule Take 1 Cap by mouth three (3) times daily. (Patient taking differently: Take 100 mg by mouth two (2) times a day.)  rOPINIRole (REQUIP) 0.5 mg tablet Take 1 Tab by mouth nightly as needed. Objective:  
 
Vitals:  
 12/24/20 1356 12/24/20 1400 12/24/20 1408 12/24/20 1408 BP:  136/67 Pulse:  62 Resp:  (!) 32 19 Temp:    (!) 96.5 °F (35.8 °C) SpO2: 98% 97% Weight:      
Height: PHYSICAL EXAM  
 
Constitutional:  the patient is well developed and in no acute distress on BIPAP 
EENMT:  Sclera clear, pupils equal, oral mucosa moist 
Respiratory: few crackles on BIPAP At 14/8 and 100% Cardiovascular:  RRR without M,G,R 
Gastrointestinal: soft and non-tender; with positive bowel sounds. Musculoskeletal: warm without cyanosis. There is + lower extremity edema. Skin:  no jaundice or rashes, no visible wounds Neurologic: no gross neuro deficits Psychiatric:  Responsive but not clear. CXR: 
CXR Results  (Last 48 hours) 12/24/20 1335  XR CHEST PORT Final result Impression:  Impression: Extensive interstitial and parenchymal opacities would raise  
suspicion for viral pneumonitis given the provided history. Pulmonary edema  
could also have this appearance. Narrative:  Portable chest:   
   
History: sob. Covid rule out. Comparison: 03/08/2020 Findings: A single view of the chest was obtained at 1326 hours. The cardiac silhouette is mildly enlarged. There are extensive bilateral  
interstitial and hazy parenchymal opacities. There are no pleural effusions. Median sternotomy wires are present. Recent Labs  
  12/24/20 
1330 WBC 6.9 HGB 11.3* HCT 35.1*  
 Recent Labs  
  12/24/20 
1330 * K 6.0*  
 * CO2 24 BUN 57* CREA 2.85* CA 8.8 ALB 2.8* TBILI 0.4 ALT 20 Recent Labs  
  12/24/20 
1333 PHI 7.31* PCO2I 41.8 PO2I 70* HCO3I 21.1* Recent Labs  
  12/24/20 
1330 LAC 1.5 Assessment:  (Medical Decision Making) Hospital Problems  Date Reviewed: 8/10/2019 Codes Class Noted POA COVID-19 ICD-10-CM: U07.1 ICD-9-CM: 079.89  12/24/2020 Unknown Acute respiratory distress syndrome (ARDS) due to severe acute respiratory syndrome coronavirus 2 (SARS-CoV-2) [facility]) ICD-10-CM: U07.1, J80 
ICD-9-CM: 518.82, 079.89  12/24/2020 Unknown Acute hypoxemic respiratory failure ([facility] ICD-10-CM: J96.01 
ICD-9-CM: 518.81  12/24/2020 Unknown Morbid obesity ([facility] ICD-10-CM: E66.01 
ICD-9-CM: 278.01  12/24/2020 Unknown Acute on chronic renal failure (HCC) ICD-10-CM: N17.9, N18.9 ICD-9-CM: 584.9, 585.9  9/17/2019 Unknown Coronary artery disease involving coronary bypass graft of native heart without angina pectoris (Chronic) ICD-10-CM: X67.827 ICD-9-CM: 414.05  6/15/2015 Yes Uncontrolled type 2 diabetes mellitus with hyperglycemia (HCC) (Chronic) ICD-10-CM: E11.65 ICD-9-CM: 250.02  6/15/2015 Yes Diabetes mellitus with hyperosmolarity without hyperglycemic hyperosmolar nonketotic coma (HCC) (Chronic) ICD-10-CM: E11.00 ICD-9-CM: 250.20  11/24/2014 Yes  
   
  
 
 
patient with DM/HTN/CAD/obesisty with recent covid infection and now confirmed here with worsening hypoxemia and needing BIPAP and ICU care. Also progressive renal failure. Plan:  (Medical Decision Making) --Will admitted [facility] ICU 
--continue BIPAP And tolerating. ABG noted, but not able to go to [facility]. Still high risk to get intubated. --add nebs 
--For COVID 19 
 --start plasma 
 --steroids 
 --Cr is too high for Remdesivir 
 --vit C and Zn 
--start Rocphein/Azith and panculture 
--IVF and coyle. F/u I's and O's 
--k+ elevated but reported hemolyzed. Will f/u later today. EKG with no acute changes --Best practice guidelines 
 --HOBE 
 --Turn frequently in BED 
 --DVT/PUD prophylaxis -- on heparin SQ/add pepcid More than 50% of the time documented was spent in face-to-face contact with the patient and in the care of the patient on the floor/unit where the patient is located.  
 
Jamilah Moore MD

## 2020-12-24 NOTE — ED TRIAGE NOTES
Pt BIB EMS for SOB and diarrhea. Reports RA sat of 44%. Placed on 10L NRB sats 78-85%. Unsure of covid exposure.

## 2020-12-24 NOTE — ED PROVIDER NOTES
Chief complaint : Shortness of breath HISTORY OF PRESENT ILLNESS : 
Location : Intrathoracic Quality : Conversational dyspnea, and dyspnea on exertion Quantity : Constant Timing : Yesterday Severity : Severe, room air sats were 44 when EMS arrived Context : Patient reports nonproductive cough Alleviating / exacerbating factors : Sats somewhat improved on high flow oxygen and BiPAP Associated Symptoms : Diarrhea, hypothermia Shortness of Breath Past Medical History:  
Diagnosis Date  Acute cystitis without hematuria 1/30/2019  CAD (coronary artery disease)  DM2 (diabetes mellitus, type 2) ([facility] [address]  Gout  HLD (hyperlipidemia)  HTN (hypertension)  Peripheral neuropathy Past Surgical History:  
Procedure Laterality Date  CARDIAC SURG PROCEDURE UNLIST    
 stents x 3 2009  HX CHOLECYSTECTOMY jennifer in 1964  HX CORONARY ARTERY BYPASS GRAFT    
 x3  
 HX TUBAL LIGATION Family History:  
Problem Relation Age of Onset  Hypertension Mother  Cancer Mother  Diabetes Mother  Heart Disease Mother Social History Socioeconomic History  Marital status: SINGLE Spouse name: Not on file  Number of children: Not on file  Years of education: Not on file  Highest education level: Not on file Occupational History  Occupation: retired  Social Needs  Financial resource strain: Not on file  Food insecurity Worry: Not on file Inability: Not on file  Transportation needs Medical: Not on file Non-medical: Not on file Tobacco Use  Smoking status: Never Smoker  Smokeless tobacco: Never Used Substance and Sexual Activity  Alcohol use: Yes Comment: socially  Drug use: No  
 Sexual activity: Not on file Lifestyle  Physical activity Days per week: Not on file Minutes per session: Not on file  Stress: Not on file Relationships  Social connections Talks on phone: Not on file Gets together: Not on file Attends Qu[employer] service: Not on file Active member of club or organization: Not on file Attends meetings of clubs or organizations: Not on file Relationship status: Not on file  Intimate partner violence Fear of current or ex partner: Not on file Emotionally abused: Not on file Physically abused: Not on file Forced sexual activity: Not on file Other Topics Concern  Not on file Social History Narrative Lives alone but has a caregiver who helps several hours per day, several days per week ALLERGIES: Sulfa (sulfonamide antibiotics) Review of Systems Unable to perform ROS: Acuity of condition Constitutional: Positive for chills. Respiratory: Positive for shortness of breath. Gastrointestinal: Positive for diarrhea. Vitals:  
 12/24/20 1356 12/24/20 1400 12/24/20 1408 12/24/20 1408 BP:  136/67 Pulse:  62 Resp:  (!) 32 19 Temp:    (!) 96.5 °F (35.8 °C) SpO2: 98% 97% Weight:      
Height:      
      
 
Physical Exam 
Vitals signs and nursing note reviewed. Constitutional:   
   General: She is in acute distress. Appearance: Normal appearance. She is well-developed. She is ill-appearing. She is not toxic-appearing or diaphoretic. Comments: Hypothermic HENT:  
   Head: Normocephalic and atraumatic. Right Ear: External ear normal.  
   Left Ear: External ear normal.  
   Mouth/Throat:  
   Mouth: Mucous membranes are moist.  
   Pharynx: Oropharynx is clear. No oropharyngeal exudate or posterior oropharyngeal erythema. Eyes:  
   General: No scleral icterus. Right eye: No discharge. Left eye: No discharge. Extraocular Movements: Extraocular movements intact. Conjunctiva/sclera: Conjunctivae normal.  
   Pupils: Pupils are equal, round, and reactive to light. Neck: Musculoskeletal: Normal range of motion and neck supple. Normal range of motion. Thyroid: No thyromegaly. Trachea: Trachea normal.  
Cardiovascular:  
   Rate and Rhythm: Normal rate and regular rhythm. Heart sounds: Normal heart sounds. No murmur. No gallop. Pulmonary:  
   Effort: Pulmonary effort is normal. No respiratory distress. Breath sounds: Examination of the right-upper field reveals rales. Examination of the left-upper field reveals rales. Examination of the right-middle field reveals rales. Examination of the left-middle field reveals rales. Examination of the right-lower field reveals rales. Examination of the left-lower field reveals rales. Rales present. No wheezing. Abdominal:  
   General: Bowel sounds are normal.  
   Palpations: Abdomen is soft. There is no hepatomegaly, splenomegaly or pulsatile mass. Tenderness: There is no abdominal tenderness. There is no guarding. Musculoskeletal: Normal range of motion. Lymphadenopathy:  
   Cervical: No cervical adenopathy. Skin: 
   General: Skin is warm and dry. Neurological:  
   General: No focal deficit present. Mental Status: She is alert and oriented to person, place, and time. Mental status is at baseline. Motor: No abnormal muscle tone. Comments: cni 2-12 grossly Psychiatric:     
   Mood and Affect: Mood normal.     
   Behavior: Behavior normal.  
 
  
 
MDM Number of Diagnoses or Management Options Acute hyperkalemia: new and requires workup Acute respiratory failure with hypoxia [facility]): new and requires workup PRATIMA (acute kidney injury) [facility]): new and requires workup Community acquired pneumonia, unspecified laterality: new and requires workup COVID-19: new and requires workup Urinary tract infection without hematuria, site unspecified: new and requires workup Diagnosis management comments: Medical decision making note: 
Respiratory failure with covid GI and hyperkalemia to 6.0 with slight hemolysis, no EKG changes Discussed with pulmonary critical care who has seen the patient and will admit to the ICU on BiPAP This concludes the \"medical decision making note\" part of this emergency department visit note. Amount and/or Complexity of Data Reviewed Clinical lab tests: reviewed and ordered (Results Include: 
 
Recent Results (from the past 24 hour(s)) -EKG, 12 LEAD, INITIAL Collection Time: 12/24/20  1:26 PM 
     Result                      Value             Ref Range Ventricular Rate            67                BPM           
     Atrial Rate                 67                BPM           
     P-R Interval                232               ms            
     QRS Duration                84                ms Q-T Interval                424               ms            
     QTC Calculation (Bezet)     448               ms            
     Calculated P Axis           66                degrees Calculated R Axis           57                degrees Calculated T Axis           89                degrees Diagnosis                                                   
 !! AGE AND GENDER SPECIFIC ECG ANALYSIS !! Sinus rhythm with 1st degree A-V block Possible Anterior infarct (cited on or before 08-MAR-2020) Abnormal ECG When compared with ECG of 08-MAR-2020 12:45, ID interval has increased ST no longer depressed in Inferior leads T wave inversion no longer evident in Inferior leads -CBC WITH AUTOMATED DIFF Collection Time: 12/24/20  1:30 PM 
     Result                      Value             Ref Range WBC                         6.9               4.3 - 11.1 K* 
     RBC                         3.79 (L)          4.05 - 5.2 M* HGB                         11.3 (L)          11.7 - 15.4 * HCT                         35.1 (L)          35.8 - 46.3 % MCV                         92.6              79.6 - 97.8 * MCH                         29.8              26.1 - 32.9 * MCHC                        32.2              31.4 - 35.0 * RDW                         13.9              11.9 - 14.6 % PLATELET                    193               150 - 450 K/* MPV                         11.5              9.4 - 12.3 FL ABSOLUTE NRBC               0.00              0.0 - 0.2 K/* DF                          AUTOMATED NEUTROPHILS                 83 (H)            43 - 78 % LYMPHOCYTES                 9 (L)             13 - 44 % MONOCYTES                   8                 4.0 - 12.0 % EOSINOPHILS                 0 (L)             0.5 - 7.8 % BASOPHILS                   0                 0.0 - 2.0 % IMMATURE GRANULOCYTES       1                 0.0 - 5.0 %   
     ABS. NEUTROPHILS            5.7               1.7 - 8.2 K/* ABS. LYMPHOCYTES            0.6               0.5 - 4.6 K/* ABS. MONOCYTES              0.5               0.1 - 1.3 K/* ABS. EOSINOPHILS            0.0               0.0 - 0.8 K/* ABS. BASOPHILS              0.0               0.0 - 0.2 K/* ABS. IMM. GRANS.            0.0               0.0 - 0.5 K/* 
-METABOLIC PANEL, COMPREHENSIVE Collection Time: 12/24/20  1:30 PM 
     Result                      Value             Ref Range Sodium                      132 (L)           136 - 145 mm* Potassium                   6.0 (H)           3.5 - 5.1 mm* Chloride                    101               98 - 107 mmo* CO2                         24                21 - 32 mmol* Anion gap                   7                 7 - 16 mmol/L Glucose                     295 (H)           65 - 100 mg/*      BUN                         57 (H)            8 - 23 MG/DL  
 Creatinine                  2.85 (H)          0.6 - 1.0 MG* 
     GFR est AA                  20 (L)            >60 ml/min/1* GFR est non-AA              17 (L)            >60 ml/min/1* Calcium                     8.8               8.3 - 10.4 M* Bilirubin, total            0.4               0.2 - 1.1 MG* ALT (SGPT)                  20                12 - 65 U/L   
     AST (SGOT)                  44 (H)            15 - 37 U/L Alk. phosphatase            90                50 - 136 U/L Protein, total              7.3               6.3 - 8.2 g/* Albumin                     2.8 (L)           3.2 - 4.6 g/* Globulin                    4.5 (H)           2.3 - 3.5 g/* A-G Ratio                   0.6 (L)           1.2 - 3.5     
-LACTIC ACID Collection Time: 12/24/20  1:30 PM 
     Result                      Value             Ref Range Lactic acid                 1.5               0.4 - 2.0 MM* 
-SARS-COV-2 Collection Time: 12/24/20  1:30 PM 
     Result                      Value             Ref Range Specimen source Nasopharyngeal 
     COVID-19 rapid test         Detected (AA)     NOTD          
-POC G3 Collection Time: 12/24/20  1:33 PM 
     Result                      Value             Ref Range Device:                     BIPAP                           
     FIO2 (POC)                  100               %             
     pH (POC)                    7.31 (L)          7.35 - 7.45   
     pCO2 (POC)                  41.8              35 - 45 MMHG  
     pO2 (POC)                   70 (L)            75 - 100 MMHG 
     HCO3 (POC)                  21.1 (L)          22 - 26 MMOL* 
     sO2 (POC)                   92 (L)            95 - 98 % Base deficit (POC)          5                 mmol/L Tidal volume                483               ml PEEP/CPAP (POC)             8                 cmH2O Allens test (POC)           YES Inspiratory Time            0.9               sec Site                        LEFT RADIAL Specimen type (POC)         ARTERIAL Performed by                                                
  CO2, POC                    22                MMOL/L Respiratory comment:                                        
 PhysicianNotified Exhaled minute volume       16.50             L/min COLLECT TIME                1,331 ) 
Tests in the radiology section of CPT®: reviewed and ordered (XR CHEST PORT Final Result Impression: Extensive interstitial and parenchymal opacities would raise suspicion for viral pneumonitis given the provided history. Pulmonary edema could also have this appearance. ) 
Discuss the patient with other providers: yes (Pulmonary critical) Risk of Complications, Morbidity, and/or Mortality Presenting problems: moderate Diagnostic procedures: low Management options: moderate General comments: CRITICAL CARE Documentation: This patient is critically ill and there is a high probability of of imminent or life threatening deterioration in the patient's condition without immediate management. The nature of the patient's clinical problem is: Respiratory failure with severe hypoxemia and COVID-19 pneumonia I have spent 32 minutes in direct patient care, documentation, review of labs/xrays/old records, discussion with Colleague . The time involved in the performance of separately reportable procedures was not counted toward critical care time. Jhony Rizzo MD; 12/24/2020 @2:35 PM 
 
 
 
Patient Progress Patient progress: improved Procedures

## 2020-12-25 NOTE — PROGRESS NOTES
Critical Care Daily Progress Note: 12/25/2020 Derrick Gomez Admission Date: 12/24/2020 The patient's chart is reviewed and the patient is discussed with the staff. Patient has DM/CAD/HTN/CKD and reported came in with dyspnea. Saturation were 44% per EMS and in ER was put on BIPAP since did not improve with NC. On 100% BIPAP and saturation only 93%. She reported did see a relative who had covid recently. Here the rapid test was positive for COVID-19. . ABG noted pH 7.31/41/70 on BIPAP 100%. CXR with diffuse infiltrates. Also noted worsening kidney function. Subjective: On BIPAP- 90 % Current Facility-Administered Medications Medication Dose Route Frequency  azithromycin (ZITHROMAX) 250 mg in 0.9% sodium chloride 250 mL IVPB  250 mg IntraVENous Q24H  cholecalciferol (VITAMIN D3) (1000 Units /25 mcg) tablet 2 Tab  2,000 Units Oral BID  cefTRIAXone (ROCEPHIN) 1 g in 0.9% sodium chloride (MBP/ADV) 50 mL MBP  1 g IntraVENous Q24H  
 0.9% sodium chloride infusion 250 mL  250 mL IntraVENous PRN  
 sodium chloride (NS) flush 5-40 mL  5-40 mL IntraVENous Q8H  
 sodium chloride (NS) flush 5-40 mL  5-40 mL IntraVENous PRN  
 heparin (porcine) injection 5,000 Units  5,000 Units SubCUTAneous Q8H  
 pantoprazole (PROTONIX) 40 mg in 0.9% sodium chloride 10 mL injection  40 mg IntraVENous DAILY  dexamethasone (DECADRON) 4 mg/mL injection 6 mg  6 mg IntraVENous DAILY  ascorbic acid (vitamin C) (VITAMIN C) tablet 500 mg  500 mg Oral BID  zinc sulfate tablet 220 mg  220 mg Oral DAILY  albuterol (PROVENTIL VENTOLIN) nebulizer solution 2.5 mg  2.5 mg Nebulization Q6H RT  
 
 
Review of Systems Constitutional:  negative for fever, chills, sweats Cardiovascular:  negative for chest pain, palpitations, syncope, edema Gastrointestinal:  negative for dysphagia, reflux, vomiting, diarrhea, abdominal pain, or melena Neurologic:  negative for focal weakness, numbness, headache Objective:  
 
Vitals:  
 12/25/20 0730 12/25/20 0741 12/25/20 0840 12/25/20 5461 BP: 123/63 126/66 Pulse: 69 64 Resp: 26 14 Temp:  98.3 °F (36.8 °C) SpO2: 97% 98% 99% 99% Weight:      
Height:      
 
 
 
Intake/Output Summary (Last 24 hours) at 12/25/2020 1738 Last data filed at 12/25/2020 0600 Gross per 24 hour Intake 257.5 ml Output  Net 257.5 ml Physical Exam:         
Constitutional:  the patient is well developed and in no acute distress EENMT:  Sclera clear, pupils equal, oral mucosa moist 
Respiratory: coarse Cardiovascular:  RRR without M,G,R 
Gastrointestinal: soft and non-tender; with positive bowel sounds. Musculoskeletal: warm without cyanosis. There is no lower extremity edema. Skin:  no jaundice or rashes, no wounds Neurologic: no gross neuro deficits Psychiatric:  Awake LINES: 
PIV line [place] DRIPS: 
None CXR: none today LAB Recent Labs  
  12/24/20 
2158 GLUCPOC 330* Recent Labs  
  12/25/20 
0354 12/24/20 
1330 WBC 6.3 6.9 HGB 10.5* 11.3* HCT 32.8* 35.1*  
 193 Recent Labs  
  12/25/20 
0354 12/24/20 
1330  132*  
K 5.1 6.0*  
* 101 CO2 20* 24 * 295* BUN 60* 57* CREA 2.87* 2.85* MG 2.6*  --   
PHOS 4.7*  --   
CA 8.2* 8.8 ALB  --  2.8* TBILI  --  0.4 ALT  --  20 Recent Labs  
  12/24/20 
1333 PHI 7.31* PCO2I 41.8 PO2I 70* HCO3I 21.1* Recent Labs  
  12/24/20 
1527 12/24/20 
1330 LAC 1.4 1.5 Recent Labs  
  12/24/20 
1333 PHI 7.31* PCO2I 41.8 PO2I 70* HCO3I 21.1* Patient Active Problem List  
Diagnosis Code  Diabetes mellitus with hyperosmolarity without hyperglycemic hyperosmolar nonketotic coma ([facility] [address] E11.00  Coronary artery disease involving coronary bypass graft of native heart without angina pectoris I25.810  
  Uncontrolled type 2 diabetes mellitus with hyperglycemia (HCC) E11.65  
 Essential hypertension I10  
 HLD (hyperlipidemia) E78.5  Chronic kidney disease, stage III (moderate) N18.30  
 Osteopenia M85.80  Vitamin D deficiency E55.9  Gastroesophageal reflux disease without esophagitis K21.9  Other allergic rhinitis J30.89  Peripheral neuropathy G62.9  
 Primary osteoarthritis involving multiple joints M89.49  
 Insomnia G47.00  RLS (restless legs syndrome) G25.81  
 Acute renal failure (ARF) (HCC) N17.9  Elevated liver function tests R79.89  
 Anemia D64.9  Right lower quadrant abdominal pain R10.31  
 Gram-negative bacteremia R78.81  
 Acute pancreatitis K85.90  Generalized weakness R53.1  Decreased oral intake R63.8  Acute on chronic renal failure (HCC) N17.9, N18.9  Dehydration E86.0  Diabetic ketoacidosis (HCC) E11.10  Hypothermia T68. [place] Spice  Metabolic acidosis Z73.8  Noncompliance Z91.19  
 Hypoalbuminemia due to protein-calorie malnutrition ([facility] E88.09, E46  
 COVID-19 U07.1  Acute respiratory distress syndrome (ARDS) due to severe acute respiratory syndrome coronavirus 2 (SARS-CoV-2) ([facility]) U07.1, J80  Acute hypoxemic respiratory failure (HCC) J96.01  
 Morbid obesity ([facility]) E66.01 Assessment:  (Medical Decision Making) Hospital Problems  Date Reviewed: 8/10/2019 Codes Class Noted POA COVID-19 ICD-10-CM: U07.1 ICD-9-CM: 079.89  12/24/2020 Unknown Acute respiratory distress syndrome (ARDS) due to severe acute respiratory syndrome coronavirus 2 (SARS-CoV-2) [facility]) ICD-10-CM: U07.1, J80 
ICD-9-CM: 518.82, 079.89  12/24/2020 Unknown Acute hypoxemic respiratory failure ([facility] ICD-10-CM: J96.01 
ICD-9-CM: 518.81  12/24/2020 Unknown Morbid obesity ([facility] [address] ICD-10-CM: E66.01 
ICD-9-CM: 278.01  12/24/2020 Unknown Acute on chronic renal failure (HCC) ICD-10-CM: N17.9, N18.9 ICD-9-CM: 584.9, 585.9  9/17/2019 Unknown Coronary artery disease involving coronary bypass graft of native heart without angina pectoris (Chronic) ICD-10-CM: T78.574 ICD-9-CM: 414.05  6/15/2015 Yes Uncontrolled type 2 diabetes mellitus with hyperglycemia (HCC) (Chronic) ICD-10-CM: E11.65 ICD-9-CM: 250.02  6/15/2015 Yes Diabetes mellitus with hyperosmolarity without hyperglycemic hyperosmolar nonketotic coma (HCC) (Chronic) ICD-10-CM: E11.00 ICD-9-CM: 250.20  11/24/2014 Yes Plan:  (Medical Decision Making) --adjust BIPAP, wean as tolerated 
- monitor renal function 
- continue Dexamethazone, not caddiate for remdesivir ABX- Zithromax, Rocephin D # 2  
-continue Zinc, VitC , vit D  
- full code, high risk for decompensation and needed intubation 
-critically ill, spent 32 min More than 50% of the time documented was spent in face-to-face contact with the patient and in the care of the patient on the floor/unit where the patient is located.  
 
Viet Jimenez MD

## 2020-12-25 NOTE — ED NOTES
This RN called blood bank to see where the pts convalescent plasma was. Blood bank says the pts plasma must be ordered from blood connection and may be a while.

## 2020-12-25 NOTE — ED NOTES
TRANSFER - OUT REPORT: 
 
Verbal report given to Bonnie APONTE(name) on Jose Mann  being transferred to [facility] 5 BMT(unit) for routine progression of care Report consisted of patients Situation, Background, Assessment and  
Recommendations(SBAR). Information from the following report(s) SBAR, ED Summary, [facility] and Recent Results was reviewed with the receiving nurse. Lines:  
Peripheral IV 12/24/20 Right Forearm (Active) Peripheral IV 12/24/20 Left Forearm (Active) Site Assessment Clean, dry, & intact 12/24/20 1542 Phlebitis Assessment 0 12/24/20 1542 Infiltration Assessment 0 12/24/20 1542 Peripheral IV 12/24/20 Left Antecubital (Active) Site Assessment Clean, dry, & intact 12/24/20 1542 Phlebitis Assessment 0 12/24/20 1542 Infiltration Assessment 0 12/24/20 1542 Opportunity for questions and clarification was provided. Patient transported with: 
 Registered Nurse

## 2020-12-26 NOTE — PROGRESS NOTES
Bedside and Verbal shift change report given to Diane Fiore (oncoming nurse) by Leslie  (offgoing nurse). Report included the following information SBAR, Kardex, ED Summary, Intake/Output, MAR, Recent Results, Med Rec Status, Cardiac Rhythm SR and Alarm Parameters .

## 2020-12-26 NOTE — PROGRESS NOTES
Patient SAT at 80% on Airvo 60L & 100%. Patient placed on BiPAP and resting comfortably at this time. Will continue to monitor. 12/25/20 2023 Oxygen Therapy O2 Sat (%) 92 % Pulse via Oximetry 75 beats per minute O2 Device BIPAP 
(V60) FIO2 (%) 85 % Respiratory Respiratory (WDL) X Breath Sounds Bilateral Diminished Cough Non-productive CPAP/BIPAP  
CPAP/BIPAP Start/Stop On Device Mode BIPAP;S/T  
$$ Bipap Daily Yes Mask Type and Size Full face; Medium Skin Condition intact PIP Observed 16 cm H20 IPAP (cm H2O) 15 cm H2O  
EPAP (cm H2O) 8 cm H2O Inspiratory Time (sec) 0.9 seconds Vt Spont (ml) 575 ml Ve Observed (l/min) 17.2 l/min Backup Rate 16 Total RR (Spontaneous) 31 breaths per minute Insp Rise Time (sec) 2 Leak (Estimated) 0 L/min  
Pt's Home Machine No  
Biomedical Check Performed Yes Settings Verified Yes Alarm Settings High Pressure 35 Low Pressure 5 Apnea 20 Low Ve 4 High Rate 50 Low Rate 8 Pulmonary Toilet Pulmonary Toilet H. O.B elevated;Cough and deep breath

## 2020-12-26 NOTE — PROGRESS NOTES
12/26/20 1454 Oxygen Therapy O2 Sat (%) 94 % Pulse via Oximetry 68 beats per minute O2 Device BIPAP  
O2 Temperature 87.8 °F (31 °C) FIO2 (%) 90 % Respiratory Respiratory (WDL) X Respiratory Pattern Tachypneic Chest/Tracheal Assessment Chest expansion, symmetrical  
Breath Sounds Bilateral Diminished Cough Dry CPAP/BIPAP Device Mode S/T Mask Type and Size Medium Skin Condition intact PIP Observed 16 cm H20 IPAP (cm H2O) 15 cm H2O  
EPAP (cm H2O) 8 cm H2O Inspiratory Time (sec) 0.9 seconds Vt Spont (ml) 418 ml Ve Observed (l/min) 14.4 l/min Backup Rate 16 Total RR (Spontaneous) 28 breaths per minute Insp Rise Time (sec) 2 Leak (Estimated) 3 L/min  
Pt's Home Machine No  
Biomedical Check Performed Yes Settings Verified Yes Alarm Settings High Pressure 35 Low Pressure 5 Low Ve 4 High Rate 50 Low Rate 8

## 2020-12-26 NOTE — PROGRESS NOTES
Critical Care Daily Progress Note: 12/26/2020 Francine Avilez Admission Date: 12/24/2020 The patient's chart is reviewed and the patient is discussed with the staff. Patient has DM/CAD/HTN/CKD and reported came in with dyspnea. Saturation were 44% per EMS and in ER was put on BIPAP since did not improve with NC. On 100% BIPAP and saturation only 93%. She reported did see a relative who had covid recently. Here the rapid test was positive for COVID-19. . ABG noted pH 7.31/41/70 on BIPAP 100%. CXR with diffuse infiltrates. Also noted worsening kidney function. Subjective: On BIPAP- 90 % Worsening renal failure Current Facility-Administered Medications Medication Dose Route Frequency  insulin lispro (HUMALOG) injection   SubCUTAneous AC&HS  insulin glargine (LANTUS) injection 15 Units  15 Units SubCUTAneous DAILY  ergocalciferol capsule 50,000 Units  50,000 Units Oral DAILY  azithromycin (ZITHROMAX) 250 mg in 0.9% sodium chloride 250 mL IVPB  250 mg IntraVENous Q24H  cefTRIAXone (ROCEPHIN) 1 g in 0.9% sodium chloride (MBP/ADV) 50 mL MBP  1 g IntraVENous Q24H  
 0.9% sodium chloride infusion 250 mL  250 mL IntraVENous PRN  
 sodium chloride (NS) flush 5-40 mL  5-40 mL IntraVENous Q8H  
 sodium chloride (NS) flush 5-40 mL  5-40 mL IntraVENous PRN  
 heparin (porcine) injection 5,000 Units  5,000 Units SubCUTAneous Q8H  
 pantoprazole (PROTONIX) 40 mg in 0.9% sodium chloride 10 mL injection  40 mg IntraVENous DAILY  dexamethasone (DECADRON) 4 mg/mL injection 6 mg  6 mg IntraVENous DAILY  ascorbic acid (vitamin C) (VITAMIN C) tablet 500 mg  500 mg Oral BID  zinc sulfate tablet 220 mg  220 mg Oral DAILY  albuterol (PROVENTIL VENTOLIN) nebulizer solution 2.5 mg  2.5 mg Nebulization Q6H RT  
 
 
Review of Systems Constitutional:  negative for fever, chills, sweats Cardiovascular:  negative for chest pain, palpitations, syncope, edema Gastrointestinal:  negative for dysphagia, reflux, vomiting, diarrhea, abdominal pain, or melena Neurologic:  negative for focal weakness, numbness, headache Objective:  
 
Vitals:  
 12/26/20 0731 12/26/20 0742 12/26/20 0754 12/26/20 0800 BP:  (!) 145/80  (!) 140/71 Pulse: 75 71 68 66 Resp: 30 (!) 32 30 30 Temp:      
SpO2: 94% 95% 94% 94% Weight:      
Height:      
 
 
 
Intake/Output Summary (Last 24 hours) at 12/26/2020 5159 Last data filed at 12/26/2020 0400 Gross per 24 hour Intake 300 ml Output 400 ml Net -100 ml Physical Exam:         
Constitutional:  the patient is well developed and in no acute distress EENMT:  Sclera clear, pupils equal, oral mucosa moist 
Respiratory: coarse Cardiovascular:  RRR without M,G,R 
Gastrointestinal: soft and non-tender; with positive bowel sounds. Musculoskeletal: warm without cyanosis. There is no lower extremity edema. Skin:  no jaundice or rashes, no wounds Neurologic: no gross neuro deficits Psychiatric:  Awake LINES: 
PIV line [place] DRIPS: 
None CXR: none today LAB Recent Labs  
  12/24/20 
2158 GLUCPOC 330* Recent Labs  
  12/26/20 
0345 12/25/20 
0354 12/24/20 
1330 WBC 9.9 6.3 6.9 HGB 9.9* 10.5* 11.3* HCT 31.2* 32.8* 35.1*  
 177 193 Recent Labs  
  12/26/20 
0345 12/25/20 
0354 12/24/20 
1330 * 136 132*  
K 5.0 5.1 6.0*  
 108* 101 CO2 20* 20* 24  
* 324* 295* BUN 75* 60* 57* CREA 3.55* 2.87* 2.85* MG 2.6* 2.6*  --   
PHOS 4.6* 4.7*  --   
CA 8.0* 8.2* 8.8 ALB  --   --  2.8* TBILI  --   --  0.4 ALT  --   --  20 Recent Labs  
  12/26/20 
0408 12/24/20 
1333 PHI 7.29* 7.31* PCO2I 40.6 41.8 PO2I 65* 70* HCO3I 19.3* 21.1* Recent Labs  
  12/24/20 
1527 12/24/20 
1330 LAC 1.4 1.5 Recent Labs  
  12/26/20 
0408 12/24/20 
1333 PHI 7.29* 7.31* PCO2I 40.6 41.8 PO2I 65* 70* HCO3I 19.3* 21.1* Patient Active Problem List  
 Diagnosis Code  Diabetes mellitus with hyperosmolarity without hyperglycemic hyperosmolar nonketotic coma ([facility] E11.00  Coronary artery disease involving coronary bypass graft of native heart without angina pectoris I25.810  
 Uncontrolled type 2 diabetes mellitus with hyperglycemia (HCC) E11.65  
 Essential hypertension I10  
 HLD (hyperlipidemia) E78.5  Chronic kidney disease, stage III (moderate) N18.30  
 Osteopenia M85.80  Vitamin D deficiency E55.9  Gastroesophageal reflux disease without esophagitis K21.9  Other allergic rhinitis J30.89  Peripheral neuropathy G62.9  
 Primary osteoarthritis involving multiple joints M89.49  
 Insomnia G47.00  RLS (restless legs syndrome) G25.81  
 Acute renal failure (ARF) (HCC) N17.9  Elevated liver function tests R79.89  
 Anemia D64.9  Right lower quadrant abdominal pain R10.31  
 Gram-negative bacteremia R78.81  
 Acute pancreatitis K85.90  Generalized weakness R53.1  Decreased oral intake R63.8  Acute on chronic renal failure (HCC) N17.9, N18.9  Dehydration E86.0  Diabetic ketoacidosis (HCC) E11.10  Hypothermia T68. Rochele Segrey  Metabolic acidosis S28.6  Noncompliance Z91.19  
 Hypoalbuminemia due to protein-calorie malnutrition ([facility] E88.09, E46  
 COVID-19 U07.1  Acute respiratory distress syndrome (ARDS) due to severe acute respiratory syndrome coronavirus 2 (SARS-CoV-2) ([facility]) U07.1, J80  Acute hypoxemic respiratory failure (HCC) J96.01  
 Morbid obesity ([facility]) E66.01 Assessment:  (Medical Decision Making) Hospital Problems  Date Reviewed: 8/10/2019 Codes Class Noted POA COVID-19 ICD-10-CM: U07.1 ICD-9-CM: 079.89  12/24/2020 Unknown Acute respiratory distress syndrome (ARDS) due to severe acute respiratory syndrome coronavirus 2 (SARS-CoV-2) [facility]) ICD-10-CM: U07.1, J80 
ICD-9-CM: 518.82, 079.89  12/24/2020 Unknown Acute hypoxemic respiratory failure ([facility] ICD-10-CM: J96.01 
ICD-9-CM: 518.81  12/24/2020 Unknown Morbid obesity ([facility] ICD-10-CM: E66.01 
ICD-9-CM: 278.01  12/24/2020 Unknown Acute on chronic renal failure (HCC) ICD-10-CM: N17.9, N18.9 ICD-9-CM: 584.9, 585.9  9/17/2019 Unknown Coronary artery disease involving coronary bypass graft of native heart without angina pectoris (Chronic) ICD-10-CM: W16.361 ICD-9-CM: 414.05  6/15/2015 Yes Uncontrolled type 2 diabetes mellitus with hyperglycemia (HCC) (Chronic) ICD-10-CM: E11.65 ICD-9-CM: 250.02  6/15/2015 Yes Diabetes mellitus with hyperosmolarity without hyperglycemic hyperosmolar nonketotic coma (HCC) (Chronic) ICD-10-CM: E11.00 ICD-9-CM: 250.20  11/24/2014 Yes Plan:  (Medical Decision Making) --adjust BIPAP, wean as tolerated 
- renal function worse, will ask renal to see  
- continue Dexamethazone, not caddiate for remdesivir ABX- Zithromax, Rocephin D # 3  
-continue Zinc, VitC , vit D  
- full code, high risk for decompensation and needed intubation 
-critically ill, spent 33 min Discussed with nursing staff More than 50% of the time documented was spent in face-to-face contact with the patient and in the care of the patient on the floor/unit where the patient is located.  
 
Candida Tsang MD

## 2020-12-26 NOTE — CONSULTS
[facility] CONSULT NOTE Admission Date: 
12/24/2020 Admission Diagnosis: 
Acute respiratory distress syndrome (ARDS) due to severe acute respiratory syndrome coronavirus 2 (SARS-CoV-2) ([facility] [U07.1, J80] COVID-19 [U07.1] Consulting physician: DR. Rich Pickett Reason for consult: PRATIMA Over CKD Subjective:  
History of Present Illness: 
 
79 y/o female with PMH of DM/CAD/HTN/CKD is admitted for COVID pneumonia on 12/24 . On BIPAP . She recently visited a relative who had [facility] Baseline 1 to 1.5 , admitted with Cr of 2.8 on 12/24 and today at 3.5 On Bipap CXR - extensive pulmonary infiltration seen Past Medical History:  
Diagnosis Date  Acute cystitis without hematuria 1/30/2019  CAD (coronary artery disease)  DM2 (diabetes mellitus, type 2) ([facility]  Gout  HLD (hyperlipidemia)  HTN (hypertension)  Peripheral neuropathy Past Surgical History:  
Procedure Laterality Date  CARDIAC SURG PROCEDURE UNLIST    
 stents x 3 2009  HX CHOLECYSTECTOMY jennifer in 1964  HX CORONARY ARTERY BYPASS GRAFT    
 x3  
 HX TUBAL LIGATION Current Facility-Administered Medications Medication Dose Route Frequency  insulin lispro (HUMALOG) injection   SubCUTAneous AC&HS  ergocalciferol capsule 50,000 Units  50,000 Units Oral DAILY  insulin glargine (LANTUS) injection 15 Units  15 Units SubCUTAneous BID  phenol throat spray (CHLORASEPTIC) 1 Spray  1 Spray Oral PRN  
 azithromycin (ZITHROMAX) 250 mg in 0.9% sodium chloride 250 mL IVPB  250 mg IntraVENous Q24H  cefTRIAXone (ROCEPHIN) 1 g in 0.9% sodium chloride (MBP/ADV) 50 mL MBP  1 g IntraVENous Q24H  
 0.9% sodium chloride infusion 250 mL  250 mL IntraVENous PRN  
 sodium chloride (NS) flush 5-40 mL  5-40 mL IntraVENous Q8H  
 sodium chloride (NS) flush 5-40 mL  5-40 mL IntraVENous PRN  
 heparin (porcine) injection 5,000 Units  5,000 Units SubCUTAneous Q8H  
  pantoprazole (PROTONIX) 40 mg in 0.9% sodium chloride 10 mL injection  40 mg IntraVENous DAILY  dexamethasone (DECADRON) 4 mg/mL injection 6 mg  6 mg IntraVENous DAILY  ascorbic acid (vitamin C) (VITAMIN C) tablet 500 mg  500 mg Oral BID  zinc sulfate tablet 220 mg  220 mg Oral DAILY  albuterol (PROVENTIL VENTOLIN) nebulizer solution 2.5 mg  2.5 mg Nebulization Q6H RT Allergies Allergen Reactions  Sulfa (Sulfonamide Antibiotics) Swelling Social History Tobacco Use  Smoking status: Never Smoker  Smokeless tobacco: Never Used Substance Use Topics  Alcohol use: Yes Comment: socially Family History Problem Relation Age of Onset  Hypertension Mother  Cancer Mother  Diabetes Mother  Heart Disease Mother Review of Systems UTO -  
 
Objective:  
Vitals:  
 12/26/20 0742 12/26/20 0754 12/26/20 0800 12/26/20 0900 BP: (!) 145/80  (!) 140/71 131/65 Pulse: 71 68 66 76 Resp: (!) 32 30 30 (!) 32 Temp: 97 °F (36.1 °C) SpO2: 95% 94% 94% (!) 84% Weight:      
Height:      
 
 
Intake/Output Summary (Last 24 hours) at 12/26/2020 1119 Last data filed at 12/26/2020 0400 Gross per 24 hour Intake 300 ml Output 400 ml Net -100 ml Physical Exam 
Per previous physician GEN :in no distress, alert and oriented HEENT: anicteric sclerae, eomi. Oropharynx without lesions. Mucous membranes are moist. 
Neck - supple without JVD, no thyromegaly. No lymphadenopathy. CV - regular rate and rhythm, no murmur, no rub Lung - clear bilaterally, lungs expand symmetrically Chest wall - normal appearance Abd - soft, nontender, bowel sounds present, no hepatosplenomegaly Data Review:  
Recent Labs  
  12/26/20 
0345 12/25/20 
0354 12/24/20 
1330 WBC 9.9 6.3 6.9 HGB 9.9* 10.5* 11.3* HCT 31.2* 32.8* 35.1*  
 177 193 Recent Labs  
  12/26/20 
0345 12/25/20 
0354 12/24/20 
1330 * 136 132*  
K 5.0 5.1 6.0*  
  108* 101 CO2 20* 20* 24 BUN 75* 60* 57* CREA 3.55* 2.87* 2.85* * 324* 295* CA 8.0* 8.2* 8.8 MG 2.6* 2.6*  --   
PHOS 4.6* 4.7*  -- No results for input(s): PH, PCO2, PO2, PCO2 in the last 72 hours. Problem List:  
 
Patient Active Problem List  
 Diagnosis Date Noted  COVID-19 12/24/2020  Acute respiratory distress syndrome (ARDS) due to severe acute respiratory syndrome coronavirus 2 (SARS-CoV-2) ([facility]) 12/24/2020  Acute hypoxemic respiratory failure ([facility]) 12/24/2020  Morbid obesity ([facility]) 12/24/2020  Dehydration 03/08/2020  Diabetic ketoacidosis ([facility]) 03/08/2020  Hypothermia 03/08/2020  Metabolic acidosis 77/29/6372  Noncompliance 03/08/2020  Hypoalbuminemia due to protein-calorie malnutrition ([facility]) 03/08/2020  Acute on chronic renal failure ([facility]) 09/17/2019  Generalized weakness 09/16/2019  Decreased oral intake 09/16/2019  Acute pancreatitis 08/12/2019  Gram-negative bacteremia 08/09/2019  Acute renal failure (ARF) ([facility]) 08/08/2019  Elevated liver function tests 08/08/2019  Anemia 08/08/2019  Right lower quadrant abdominal pain 08/08/2019  Coronary artery disease involving coronary bypass graft of native heart without angina pectoris 06/15/2015  Uncontrolled type 2 diabetes mellitus with hyperglycemia ([facility]) 06/15/2015  Essential hypertension 06/15/2015  HLD (hyperlipidemia) 06/15/2015  Chronic kidney disease, stage III (moderate) 06/15/2015  Osteopenia 06/15/2015  Vitamin D deficiency 06/15/2015  Gastroesophageal reflux disease without esophagitis 06/15/2015  Other allergic rhinitis 06/15/2015  Peripheral neuropathy 06/15/2015  Primary osteoarthritis involving multiple joints 06/15/2015  Insomnia 06/15/2015  RLS (restless legs syndrome) 06/15/2015  Diabetes mellitus with hyperosmolarity without hyperglycemic hyperosmolar nonketotic coma ([facility]) 11/24/2014 Assessment and Plan: 1. PRATIMA over CKD Possibly COVID ATN , non oliguric Baseline 1 to 1.5 , admitted with Cr of 2.8 on 12/24 and today at 3.5 Will do UA , Urine chemistries , CK , Will do a very gentle hydration and watch O2 sat closely - Echo from 2019 - Normal EF and systolic function 2. COVID  
BIPAP, dexamethasone e Not a candidate for remdesivir On abx 3. Anemia 4. Hypermagnesemia and phosphatemia seen sec to PRATIMA Hugo MD

## 2020-12-26 NOTE — PROGRESS NOTES
Critical Result Notification Received and verbally repeated the following test results blood glucose 556,  from  12/26/20, at 0400 Dr. Gurpreet De Los Santos informed, will start Lantus 15 units daily, high sliding scale daily and Humulin  10 units once;  
 
 
 
Sawyer Og RN

## 2020-12-27 NOTE — PROGRESS NOTES
[facility] PROGRESS NOTE Follow up for: PRATIMA Subjective:  
Patient seen and examined. Chart, notes, labs, imaging, results all reviewed. In ICU On BIPAP  
 
ROS: 
UTO - BIPAP Objective:  
Exam: 
Vitals:  
 12/27/20 0330 12/27/20 0344 12/27/20 0400 12/27/20 0430 BP:   131/65 Pulse: (!) 53  (!) 57 (!) 58 Resp: (!) 45  25 27 Temp:   97.8 °F (36.6 °C) SpO2: 98% 99% 98% 97% Weight:      
Height:      
 
 
 
Intake/Output Summary (Last 24 hours) at 12/27/2020 1034 Last data filed at 12/27/2020 0430 Gross per 24 hour Intake 1842.25 ml Output 625 ml Net 1217.25 ml  
 
 
Current Facility-Administered Medications Medication Dose Route Frequency  ascorbic acid (vitamin C) (VITAMIN C) tablet 1,000 mg  1,000 mg Oral BID  [START ON 12/28/2020] cholecalciferol (VITAMIN D3) (1000 Units /25 mcg) tablet 5 Tab  5,000 Units Oral DAILY  insulin lispro (HUMALOG) injection   SubCUTAneous AC&HS  ergocalciferol capsule 50,000 Units  50,000 Units Oral DAILY  insulin glargine (LANTUS) injection 15 Units  15 Units SubCUTAneous BID  phenol throat spray (CHLORASEPTIC) 1 Spray  1 Spray Oral PRN  
 azithromycin (ZITHROMAX) tablet 250 mg  250 mg Oral DAILY  lactated Ringers infusion  75 mL/hr IntraVENous CONTINUOUS  
 lip protectant (BLISTEX) ointment 1 Each  1 Each Topical PRN  
 acetaminophen (TYLENOL) tablet 650 mg  650 mg Oral Q6H PRN  
 cefTRIAXone (ROCEPHIN) 1 g in 0.9% sodium chloride (MBP/ADV) 50 mL MBP  1 g IntraVENous Q24H  
 0.9% sodium chloride infusion 250 mL  250 mL IntraVENous PRN  
 sodium chloride (NS) flush 5-40 mL  5-40 mL IntraVENous Q8H  
 sodium chloride (NS) flush 5-40 mL  5-40 mL IntraVENous PRN  
 heparin (porcine) injection 5,000 Units  5,000 Units SubCUTAneous Q8H  
 pantoprazole (PROTONIX) 40 mg in 0.9% sodium chloride 10 mL injection  40 mg IntraVENous DAILY  dexamethasone (DECADRON) 4 mg/mL injection 6 mg  6 mg IntraVENous DAILY  zinc sulfate tablet 220 mg  220 mg Oral DAILY  albuterol (PROVENTIL VENTOLIN) nebulizer solution 2.5 mg  2.5 mg Nebulization Q6H RT  
 
 
EXAM 
Per previous physician GEN - Alert, oriented, in no distress CV - S1, S2, RRR, no rub, murmur, or gallop Lung - clear to auscultation bilaterally Abd - soft, nontender, BS present Ext - no edema Recent Labs  
  12/27/20 
0256 12/26/20 
0345 12/25/20 
0354 WBC 11.7* 9.9 6.3 HGB 10.1* 9.9* 10.5* HCT 31.4* 31.2* 32.8*  223 177 Recent Labs  
  12/27/20 
0256 12/26/20 
0345 12/25/20 
0354  134* 136  
K 4.5 5.0 5.1 * 107 108* CO2 22 20* 20* BUN 79* 75* 60* CREA 3.56* 3.55* 2.87* CA 8.3 8.0* 8.2*  
* 557* 324* MG 2.8* 2.6* 2.6* PHOS 4.0* 4.6* 4.7* Assessment and Plan: 1. PRATIMA over CKD Possibly COVID ATN , non oliguric Baseline 1 to 1.5 , admitted with Cr of 2.8 on 12/24 and today at 3.5  
 UA proteinuria  Seen , yeast present  , Urine chemistries wnl , On  gentle hydration and watch O2 sat closely - Echo from 2019 - Normal EF and systolic function Renal function stabilizing will watch closely for hd needs   
  
2.COVID  
BIPAP, dexamethasone e Not a candidate for remdesivir On abx  
  
3. Anemia  
  
4. Hypermagnesemia and phosphatemia seen sec to PRATIMA Discussed with Dr Antoine Hale MD

## 2020-12-27 NOTE — PROGRESS NOTES
Reviewed notes for spiritual concerns Spoke with staff Prayer for patient Noted that she is a retired  Appears calm Staff providing very compassionate care

## 2020-12-27 NOTE — PROGRESS NOTES
Critical Care Daily Progress Note: 12/27/2020 Ene Marker Admission Date: 12/24/2020 The patient's chart is reviewed and the patient is discussed with the staff. Patient has DM/CAD/HTN/CKD and reported came in with dyspnea. Saturation were 44% per EMS and in ER was put on BIPAP since did not improve with NC. On 100% BIPAP and saturation only 93%. She reported did see a relative who had covid recently. Here the rapid test was positive for COVID-19. . ABG noted pH 7.31/41/70 on BIPAP 100%. CXR with diffuse infiltrates. Also noted worsening kidney function. Subjective: On BIPAP but down to 80 % Seen by nephrology yesterday , Cr not worse Current Facility-Administered Medications Medication Dose Route Frequency  ascorbic acid (vitamin C) (VITAMIN C) tablet 1,000 mg  1,000 mg Oral BID  [START ON 12/28/2020] cholecalciferol (VITAMIN D3) (400 Units /10 mcg) tablet 12.5 Tab  5,000 Units Oral DAILY  insulin lispro (HUMALOG) injection   SubCUTAneous AC&HS  ergocalciferol capsule 50,000 Units  50,000 Units Oral DAILY  insulin glargine (LANTUS) injection 15 Units  15 Units SubCUTAneous BID  phenol throat spray (CHLORASEPTIC) 1 Spray  1 Spray Oral PRN  
 azithromycin (ZITHROMAX) tablet 250 mg  250 mg Oral DAILY  lactated Ringers infusion  75 mL/hr IntraVENous CONTINUOUS  
 lip protectant (BLISTEX) ointment 1 Each  1 Each Topical PRN  
 acetaminophen (TYLENOL) tablet 650 mg  650 mg Oral Q6H PRN  
 cefTRIAXone (ROCEPHIN) 1 g in 0.9% sodium chloride (MBP/ADV) 50 mL MBP  1 g IntraVENous Q24H  
 0.9% sodium chloride infusion 250 mL  250 mL IntraVENous PRN  
 sodium chloride (NS) flush 5-40 mL  5-40 mL IntraVENous Q8H  
 sodium chloride (NS) flush 5-40 mL  5-40 mL IntraVENous PRN  
 heparin (porcine) injection 5,000 Units  5,000 Units SubCUTAneous Q8H  
 pantoprazole (PROTONIX) 40 mg in 0.9% sodium chloride 10 mL injection  40 mg IntraVENous DAILY  dexamethasone (DECADRON) 4 mg/mL injection 6 mg  6 mg IntraVENous DAILY  zinc sulfate tablet 220 mg  220 mg Oral DAILY  albuterol (PROVENTIL VENTOLIN) nebulizer solution 2.5 mg  2.5 mg Nebulization Q6H RT  
 
 
Review of Systems Constitutional:  negative for fever, chills, sweats Cardiovascular:  negative for chest pain, palpitations, syncope, edema Gastrointestinal:  negative for dysphagia, reflux, vomiting, diarrhea, abdominal pain, or melena Neurologic:  negative for focal weakness, numbness, headache Objective:  
 
Vitals:  
 12/27/20 0330 12/27/20 0344 12/27/20 0400 12/27/20 0430 BP:   131/65 Pulse: (!) 53  (!) 57 (!) 58 Resp: (!) 45  25 27 Temp:   97.8 °F (36.6 °C) SpO2: 98% 99% 98% 97% Weight:      
Height:      
 
 
 
Intake/Output Summary (Last 24 hours) at 12/27/2020 5446 Last data filed at 12/27/2020 0430 Gross per 24 hour Intake 1842.25 ml Output 625 ml Net 1217.25 ml Physical Exam:         
Constitutional:  the patient is well developed and in no acute distress EENMT:  Sclera clear, pupils equal, oral mucosa moist 
Respiratory: coarse Cardiovascular:  RRR without M,G,R 
Gastrointestinal: soft and non-tender; with positive bowel sounds. Musculoskeletal: warm without cyanosis. There is no lower extremity edema. Skin:  no jaundice or rashes, no wounds Neurologic: no gross neuro deficits Psychiatric:  Awake LINES: 
PIV line [place] DRIPS: 
None CXR: none today LAB Recent Labs  
  12/26/20 
1936 12/26/20 
1633 12/26/20 
1121 12/26/20 
5482 12/24/20 
2158 GLUCPOC 310* 379* 467* 436* 330* Recent Labs  
  12/27/20 
0256 12/26/20 
0345 12/25/20 
0354 WBC 11.7* 9.9 6.3 HGB 10.1* 9.9* 10.5* HCT 31.4* 31.2* 32.8*  223 177 Recent Labs  
  12/27/20 
0256 12/26/20 
0345 12/25/20 
0354 12/24/20 
1330  134* 136 132* K 4.5 5.0 5.1 6.0*  
* 107 108* 101 CO2 22 20* 20* 24 * 557* 324* 295* BUN 79* 75* 60* 57* CREA 3.56* 3.55* 2.87* 2.85* MG 2.8* 2.6* 2.6*  --   
PHOS 4.0* 4.6* 4.7*  --   
CA 8.3 8.0* 8.2* 8.8 ALB  --   --   --  2.8* TBILI  --   --   --  0.4 ALT  --   --   --  20 Recent Labs  
  12/27/20 
0224 12/26/20 
0408 12/24/20 
1333 PHI 7.27* 7.29* 7.31* PCO2I 43.1 40.6 41.8 PO2I 81 65* 70* HCO3I 19.8* 19.3* 21.1* Recent Labs  
  12/24/20 
1527 12/24/20 
1330 LAC 1.4 1.5 Recent Labs  
  12/27/20 0224 12/26/20 
0408 12/24/20 
1333 PHI 7.27* 7.29* 7.31* PCO2I 43.1 40.6 41.8 PO2I 81 65* 70* HCO3I 19.8* 19.3* 21.1* Patient Active Problem List  
Diagnosis Code  Diabetes mellitus with hyperosmolarity without hyperglycemic hyperosmolar nonketotic coma ([facility] E11.00  Coronary artery disease involving coronary bypass graft of native heart without angina pectoris I25.810  
 Uncontrolled type 2 diabetes mellitus with hyperglycemia (HCC) E11.65  
 Essential hypertension I10  
 HLD (hyperlipidemia) E78.5  Chronic kidney disease, stage III (moderate) N18.30  
 Osteopenia M85.80  Vitamin D deficiency E55.9  Gastroesophageal reflux disease without esophagitis K21.9  Other allergic rhinitis J30.89  Peripheral neuropathy G62.9  
 Primary osteoarthritis involving multiple joints M89.49  
 Insomnia G47.00  RLS (restless legs syndrome) G25.81  
 Acute renal failure (ARF) (HCC) N17.9  Elevated liver function tests R79.89  
 Anemia D64.9  Right lower quadrant abdominal pain R10.31  
 Gram-negative bacteremia R78.81  
 Acute pancreatitis K85.90  Generalized weakness R53.1  Decreased oral intake R63.8  Acute on chronic renal failure (HCC) N17.9, N18.9  Dehydration E86.0  Diabetic ketoacidosis (HCC) E11.10  Hypothermia T68. Sherice Sebastian  Metabolic acidosis R31.6  Noncompliance Z91.19  
 Hypoalbuminemia due to protein-calorie malnutrition ([facility] E88.09, E46  
 COVID-19 U07.1  Acute respiratory distress syndrome (ARDS) due to severe acute respiratory syndrome coronavirus 2 (SARS-CoV-2) (HCC) U07.1, J80  Acute hypoxemic respiratory failure (HCC) J96.01  
 Morbid obesity (HCC) E66.01 Assessment:  (Medical Decision Making) Hospital Problems  Date Reviewed: 8/10/2019 Codes Class Noted POA COVID-19 ICD-10-CM: U07.1 ICD-9-CM: 079.89  12/24/2020 Unknown Acute respiratory distress syndrome (ARDS) due to severe acute respiratory syndrome coronavirus 2 (SARS-CoV-2) [facility]) ICD-10-CM: U07.1, J80 
ICD-9-CM: 518.82, 079.89  12/24/2020 Unknown Acute hypoxemic respiratory failure ([email or web address] ICD-10-CM: J96.01 
ICD-9-CM: 518.81  12/24/2020 Unknown Morbid obesity ([email or web address] ICD-10-CM: E66.01 
ICD-9-CM: 278.01  12/24/2020 Unknown Acute on chronic renal failure (HCC) ICD-10-CM: N17.9, N18.9 ICD-9-CM: 584.9, 585.9  9/17/2019 Unknown Coronary artery disease involving coronary bypass graft of native heart without angina pectoris (Chronic) ICD-10-CM: B46.524 ICD-9-CM: 414.05  6/15/2015 Yes Uncontrolled type 2 diabetes mellitus with hyperglycemia (HCC) (Chronic) ICD-10-CM: E11.65 ICD-9-CM: 250.02  6/15/2015 Yes Diabetes mellitus with hyperosmolarity without hyperglycemic hyperosmolar nonketotic coma (HCC) (Chronic) ICD-10-CM: E11.00 ICD-9-CM: 250.20  11/24/2014 Yes Plan:  (Medical Decision Making) --adjust BIPAP, wean as tolerated, will try to see if she can tolerate airvo  
- renal function about the same, may still need HD 
-she does not have central line ,she may need HD ,will wait till then ,may  Rather need trialysis catheter  
- continue Dexamethazone, not caddiate for remdesivir ABX- Zithromax, Rocephin D # 4  
-continue Zinc, VitC , vit D -high doses  
- full code, high risk for decompensation and needed intubation 
-critically ill, spent 33 min Discussed with nursing staff and also updated her family  -hSavon Pineda More than 50% of the time documented was spent in face-to-face contact with the patient and in the care of the patient on the floor/unit where the patient is located.  
 
Cecilia Farley MD

## 2020-12-28 PROBLEM — E66.01 MORBID OBESITY (HCC): Status: RESOLVED | Noted: 2020-01-01 | Resolved: 2020-01-01

## 2020-12-28 NOTE — PROGRESS NOTES
RNCM spoke with daughter Lilo Russo [phone]  On discharge planning for patient in rm 56 (her mother) Patient was living with other daughter in her home. Jl Cruz stated that her sister has been evicted from her home and that patient can not return. Pt will need to evaluated by PT and OT when medically stable. PPD will be ordered for possible [facility] facility. Per daughter patient does not currently have a HCPOA. Pt is in BMT/ICU requiring 50L Heated HiFlo. Discharge plan is to have PT and OT eval and referral to [facility] pending evals. Will continue to follow for discharge planning needs Please consult  if any new issues arise Care Management Interventions PCP Verified by CM: Vidal Sandoval MD) Mode of Transport at Discharge: Self Transition of Care Consult (CM Consult): Discharge Planning Current Support Network: Own Home, Family Lives [facility] [phone]) Confirm Follow Up Transport: Family Discharge Location Discharge Placement: Unable to determine at this time

## 2020-12-28 NOTE — PROGRESS NOTES
[facility] PROGRESS NOTE Follow up for: PRATIMA Subjective:  
Patient seen and examined in ICU COVID unit remains on 99.5% AirVo, awake, reports weakness/fatigue, appetite ok ROS: 
General: no fever/chills CV; no CP Lung: + SOB, + cough GI: no N/V/D Ext; no edema Objective:  
Exam: 
Vitals:  
 12/28/20 0700 12/28/20 0701 12/28/20 7750 12/28/20 0801 BP: (!) 145/66   115/62 Pulse: (!) 56 67  (!) 55 Resp: 26 24 22 Temp:    (!) 94.5 °F (34.7 °C) SpO2: 98% 97%  100% Weight:   71.4 kg (157 lb 6.5 oz) Height:      
 
 
 
Intake/Output Summary (Last 24 hours) at 12/28/2020 1004 Last data filed at 12/28/2020 1649 Gross per 24 hour Intake 481 ml Output 625 ml Net -144 ml Current Facility-Administered Medications Medication Dose Route Frequency  ascorbic acid (vitamin C) (VITAMIN C) tablet 1,000 mg  1,000 mg Oral BID  cholecalciferol (VITAMIN D3) (1000 Units /25 mcg) tablet 5 Tab  5,000 Units Oral DAILY  insulin lispro (HUMALOG) injection 0-15 Units  0-15 Units SubCUTAneous AC&HS  insulin glargine (LANTUS) injection 20 Units  20 Units SubCUTAneous BID  phenol throat spray (CHLORASEPTIC) 1 Spray  1 Spray Oral PRN  
 azithromycin (ZITHROMAX) tablet 250 mg  250 mg Oral DAILY  lip protectant (BLISTEX) ointment 1 Each  1 Each Topical PRN  
 acetaminophen (TYLENOL) tablet 650 mg  650 mg Oral Q6H PRN  
 cefTRIAXone (ROCEPHIN) 1 g in 0.9% sodium chloride (MBP/ADV) 50 mL MBP  1 g IntraVENous Q24H  
 0.9% sodium chloride infusion 250 mL  250 mL IntraVENous PRN  
 sodium chloride (NS) flush 5-40 mL  5-40 mL IntraVENous Q8H  
 sodium chloride (NS) flush 5-40 mL  5-40 mL IntraVENous PRN  
 heparin (porcine) injection 5,000 Units  5,000 Units SubCUTAneous Q8H  
 pantoprazole (PROTONIX) 40 mg in 0.9% sodium chloride 10 mL injection  40 mg IntraVENous DAILY  dexamethasone (DECADRON) 4 mg/mL injection 6 mg  6 mg IntraVENous DAILY  zinc sulfate tablet 220 mg  220 mg Oral DAILY  albuterol (PROVENTIL VENTOLIN) nebulizer solution 2.5 mg  2.5 mg Nebulization Q6H RT  
 
 
EXAM 
GEN - Alert, oriented, in no distress CV - S1, S2, Regular rate, no Rub Lung - diminished lung sounds bilaterally Abd - soft, nontender, BS present Ext - no edema Recent Labs  
  12/28/20 
0559 12/27/20 
0256 12/26/20 
0345 WBC 13.0* 11.7* 9.9 HGB 10.0* 10.1* 9.9*  
HCT 30.9* 31.4* 31.2*  
 212 223 Recent Labs  
  12/28/20 
0559 12/27/20 
0256 12/26/20 
0345  140 134* K 4.5 4.5 5.0  
* 112* 107 CO2 22 22 20* BUN 82* 79* 75* CREA 3.78* 3.56* 3.55* CA 8.2* 8.3 8.0*  
* 145* 557* MG  --  2.8* 2.6* PHOS  --  4.0* 4.6* Assessment and Plan: 1. PRATIMA over CKD Possibly COVID ATN , non oliguric Baseline 1 to 1.5 , admitted with Cr of 2.8 on 12/24 and today at 3.5  
 UA proteinuria  Seen , yeast present  , Urine chemistries wnl , On  gentle hydration and watch O2 sat closely - Echo from 2019 - Normal EF and systolic function Renal function worsening, non oliguric, if continue to worsen may require RRT in the next 24-48 hours. DW with pt agrees to dialysis if imminent    
  
2.COVID AirVo and BiPap @ HS, on dexamethasone Not a candidate for remdesivir On rocephin, azithromycin  
  
3. Anemia stable  
  
4. Hypermagnesemia and phosphatemia seen sec to PRATIMA Duane Deluna NP

## 2020-12-28 NOTE — PROGRESS NOTES
Problem: Falls - Risk of 
Goal: *Absence of Falls Description: Document Sera Will Fall Risk and appropriate interventions in the flowsheet. Outcome: Progressing Towards Goal 
Note: Fall Risk Interventions: 
  
 
Mentation Interventions: Bed/chair exit alarm, Adequate sleep, hydration, pain control, Evaluate medications/consider consulting pharmacy, More frequent rounding Medication Interventions: Evaluate medications/consider consulting pharmacy Elimination Interventions: Call light in reach, Bed/chair exit alarm, Patient to call for help with toileting needs, Toileting schedule/hourly rounds Problem: Patient Education: Go to Patient Education Activity Goal: Patient/Family Education Outcome: Progressing Towards Goal 
  
Problem: Pressure Injury - Risk of 
Goal: *Prevention of pressure injury Description: Document Ashu Scale and appropriate interventions in the flowsheet. Outcome: Progressing Towards Goal 
Note: Pressure Injury Interventions: 
Sensory Interventions: Assess changes in LOC, Float heels, Minimize linen layers, Turn and reposition approx. every two hours (pillows and wedges if needed) Moisture Interventions: Apply protective barrier, creams and emollients, Check for incontinence Q2 hours and as needed, Minimize layers Activity Interventions: Pressure redistribution bed/mattress(bed type) Mobility Interventions: Pressure redistribution bed/mattress (bed type) Nutrition Interventions: Document food/fluid/supplement intake Friction and Shear Interventions: Apply protective barrier, creams and emollients, Minimize layers Problem: Patient Education: Go to Patient Education Activity Goal: Patient/Family Education Outcome: Progressing Towards Goal 
  
Problem: Diabetes Self-Management Goal: *Disease process and treatment process Description: Define diabetes and identify own type of diabetes; list 3 options for treating diabetes. Outcome: Progressing Towards Goal 
Goal: *Incorporating nutritional management into lifestyle 
Description: Describe effect of type, amount and timing of food on blood glucose; list 3 methods for planning meals. 
Outcome: Progressing Towards Goal 
Goal: *Incorporating physical activity into lifestyle 
Description: State effect of exercise on blood glucose levels. 
Outcome: Progressing Towards Goal 
Goal: *Developing strategies to promote health/change behavior 
Description: Define the ABC's of diabetes; identify appropriate screenings, schedule and personal plan for screenings. 
Outcome: Progressing Towards Goal 
Goal: *Using medications safely 
Description: State effect of diabetes medications on diabetes; name diabetes medication taking, action and side effects. 
Outcome: Progressing Towards Goal 
Goal: *Monitoring blood glucose, interpreting and using results 
Description: Identify recommended blood glucose targets  and personal targets. 
Outcome: Progressing Towards Goal 
Goal: *Prevention, detection, treatment of acute complications 
Description: List symptoms of hyper- and hypoglycemia; describe how to treat low blood sugar and actions for lowering  high blood glucose level. 
Outcome: Progressing Towards Goal 
Goal: *Prevention, detection and treatment of chronic complications 
Description: Define the natural course of diabetes and describe the relationship of blood glucose levels to long term complications of diabetes. 
Outcome: Progressing Towards Goal 
Goal: *Developing strategies to address psychosocial issues 
Description: Describe feelings about living with diabetes; identify support needed and support network 
Outcome: Progressing Towards Goal 
Goal: *Insulin pump training 
Outcome: Progressing Towards Goal 
Goal: *Sick day guidelines 
Outcome: Progressing Towards Goal 
Goal: *Patient Specific Goal (EDIT GOAL, INSERT TEXT) 
Outcome: Progressing Towards Goal 
  
 Problem: Patient Education: Go to Patient Education Activity Goal: Patient/Family Education Outcome: Progressing Towards Goal

## 2020-12-28 NOTE — PROGRESS NOTES
Critical Care Daily Progress Note: 12/28/2020 Brant Solorzano Admission Date: 12/24/2020 The patient's chart is reviewed and the patient is discussed with the staff. Patient has DM/CAD/HTN/CKD and reported came in with dyspnea. Saturation were 44% per EMS and in ER was put on BIPAP since did not improve with NC. On 100% BIPAP and saturation only 93%. She reported did see a relative who had covid recently. Here the rapid test was positive for COVID-19. . ABG noted pH 7.31/41/70 on BIPAP 100%. CXR with diffuse infiltrates. Also noted worsening kidney function. Subjective: On Optiflow and now on 100 % with sat of 100%. Feels better. Creatinine only a little higher with  ml yesterday. Blood sugars better. Temp low at times. Current Facility-Administered Medications Medication Dose Route Frequency  ascorbic acid (vitamin C) (VITAMIN C) tablet 1,000 mg  1,000 mg Oral BID  cholecalciferol (VITAMIN D3) (1000 Units /25 mcg) tablet 5 Tab  5,000 Units Oral DAILY  insulin lispro (HUMALOG) injection 0-15 Units  0-15 Units SubCUTAneous AC&HS  insulin glargine (LANTUS) injection 20 Units  20 Units SubCUTAneous BID  phenol throat spray (CHLORASEPTIC) 1 Spray  1 Spray Oral PRN  
 azithromycin (ZITHROMAX) tablet 250 mg  250 mg Oral DAILY  lip protectant (BLISTEX) ointment 1 Each  1 Each Topical PRN  
 acetaminophen (TYLENOL) tablet 650 mg  650 mg Oral Q6H PRN  
 cefTRIAXone (ROCEPHIN) 1 g in 0.9% sodium chloride (MBP/ADV) 50 mL MBP  1 g IntraVENous Q24H  
 0.9% sodium chloride infusion 250 mL  250 mL IntraVENous PRN  
 sodium chloride (NS) flush 5-40 mL  5-40 mL IntraVENous Q8H  
 sodium chloride (NS) flush 5-40 mL  5-40 mL IntraVENous PRN  
 heparin (porcine) injection 5,000 Units  5,000 Units SubCUTAneous Q8H  
 pantoprazole (PROTONIX) 40 mg in 0.9% sodium chloride 10 mL injection  40 mg IntraVENous DAILY  dexamethasone (DECADRON) 4 mg/mL injection 6 mg  6 mg IntraVENous DAILY  zinc sulfate tablet 220 mg  220 mg Oral DAILY  albuterol (PROVENTIL VENTOLIN) nebulizer solution 2.5 mg  2.5 mg Nebulization Q6H RT  
 
 
Review of Systems Constitutional:  negative for fever, chills, sweats Cardiovascular:  negative for chest pain, palpitations, syncope, edema Gastrointestinal:  negative for dysphagia, reflux, vomiting, diarrhea, abdominal pain, or melena Neurologic:  negative for focal weakness, numbness, headache Objective:  
 
Vitals:  
 12/28/20 0700 12/28/20 0701 12/28/20 2992 12/28/20 0801 BP: (!) 145/66   115/62 Pulse: (!) 56 67  (!) 55 Resp: 26 24  22 Temp:    (!) 94.5 °F (34.7 °C) SpO2: 98% 97%  100% Weight:   157 lb 6.5 oz (71.4 kg) Height:      
 
 
 
Intake/Output Summary (Last 24 hours) at 12/28/2020 1815 Last data filed at 12/28/2020 9474 Gross per 24 hour Intake 481 ml Output 625 ml Net -144 ml Physical Exam:         
Constitutional:  the patient is well developed and in no acute distress EENMT:  Sclera clear, pupils equal, oral mucosa moist 
Respiratory: decreased BS Cardiovascular:  RRR without M,G,R 
Gastrointestinal: soft and non-tender; with positive bowel sounds. Musculoskeletal: warm without cyanosis. There is no lower extremity edema. Skin:  no jaundice or rashes, no wounds Neurologic: no gross neuro deficits Psychiatric:  Awake LINES: 
PIV line [place] DRIPS: 
None CXR: none today LAB Recent Labs  
  12/28/20 
0603 12/27/20 
2226 12/27/20 
1630 12/27/20 
1209 12/26/20 
1936 GLUCPOC 159* 356* 367* 285* 310* Recent Labs  
  12/28/20 
0559 12/27/20 
0256 12/26/20 
0345 WBC 13.0* 11.7* 9.9 HGB 10.0* 10.1* 9.9*  
HCT 30.9* 31.4* 31.2*  
 212 223 Recent Labs  
  12/28/20 
0559 12/27/20 
0256 12/26/20 
0345  140 134* K 4.5 4.5 5.0  
* 112* 107 CO2 22 22 20* * 145* 557* BUN 82* 79* 75* CREA 3.78* 3.56* 3.55* MG  --  2.8* 2.6* PHOS  --  4.0* 4.6*  
CA 8.2* 8.3 8.0* Recent Labs  
  12/27/20 0224 12/26/20 
0408 PHI 7.27* 7.29* PCO2I 43.1 40.6 PO2I 81 65* HCO3I 19.8* 19.3* No results for input(s): LCAD, LAC in the last 72 hours. Recent Labs  
  12/27/20 0224 12/26/20 
0408 PHI 7.27* 7.29* PCO2I 43.1 40.6 PO2I 81 65* HCO3I 19.8* 19.3* Patient Active Problem List  
Diagnosis Code  Diabetes mellitus with hyperosmolarity without hyperglycemic hyperosmolar nonketotic coma ([facility] E11.00  Coronary artery disease involving coronary bypass graft of native heart without angina pectoris I25.810  
 Uncontrolled type 2 diabetes mellitus with hyperglycemia ([facility]) E11.65  
 Essential hypertension I10  
 HLD (hyperlipidemia) E78.5  Chronic kidney disease, stage III (moderate) N18.30  
 Osteopenia M85.80  Vitamin D deficiency E55.9  Gastroesophageal reflux disease without esophagitis K21.9  Other allergic rhinitis J30.89  Peripheral neuropathy G62.9  
 Primary osteoarthritis involving multiple joints M89.49  
 Insomnia G47.00  RLS (restless legs syndrome) G25.81  
 Acute renal failure (ARF) ([facility]) N17.9  Elevated liver function tests R79.89  
 Anemia D64.9  Right lower quadrant abdominal pain R10.31  
 Gram-negative bacteremia R78.81  
 Acute pancreatitis K85.90  Generalized weakness R53.1  Decreased oral intake R63.8  Acute on chronic renal failure (HCC) N17.9, N18.9  Dehydration E86.0  Diabetic ketoacidosis ([facility]) E11.10  Hypothermia T68. Florencio Nahid  Metabolic acidosis J84.5  Noncompliance Z91.19  
 Hypoalbuminemia due to protein-calorie malnutrition ([facility] E88.09, E46  
 COVID-19 U07.1  Acute respiratory distress syndrome (ARDS) due to severe acute respiratory syndrome coronavirus 2 (SARS-CoV-2) ([facility]) U07.1, J80  Acute hypoxemic respiratory failure ([facility]) J96.01  
 Morbid obesity ([facility]) E66.01  
 
 
 
 Assessment:  (Medical Decision Making) Hospital Problems  Date Reviewed: 8/10/2019 Codes Class Noted POA COVID-19 ICD-10-CM: U07.1 ICD-9-CM: 079.89  12/24/2020 Unknown On decadron and got plasma. Renal function precludes remdesevir. Acute respiratory distress syndrome (ARDS) due to severe acute respiratory syndrome coronavirus 2 (SARS-CoV-2) [facility]) ICD-10-CM: U07.1, J80 
ICD-9-CM: 518.82, 079.89  12/24/2020 Unknown Now off BIPAP and tolerating optiflow. Wean oxygen as able. Acute hypoxemic respiratory failure ([facility] ICD-10-CM: J96.01 
ICD-9-CM: 518.81  12/24/2020 Unknown Ongoing Acute on chronic renal failure (HCC) ICD-10-CM: N17.9, N18.9 ICD-9-CM: 584.9, 585.9  9/17/2019 Unknown Ongoing. Off LR Coronary artery disease involving coronary bypass graft of native heart without angina pectoris (Chronic) ICD-10-CM: D19.744 ICD-9-CM: 414.05  6/15/2015 Yes Uncontrolled type 2 diabetes mellitus with hyperglycemia (HCC) (Chronic) ICD-10-CM: E11.65 ICD-9-CM: 250.02  6/15/2015 Yes Diabetes mellitus with hyperosmolarity without hyperglycemic hyperosmolar nonketotic coma (HCC) (Chronic) ICD-10-CM: E11.00 ICD-9-CM: 250.20  11/24/2014 Yes Ongoing Plan:  (Medical Decision Making) Continue Airvo. BIPAP with sleep. Continue Decadron D5/10. ABX- Zithromax, Rocephin D # 4 Continue Zinc, VitC , vit D -high doses Full code, high risk for decompensation and needed intubation. 
 
 
-Critically ill, spent 33 min Discussed with nursing staff and also updated her family  -Shahab Beverly More than 50% of the time documented was spent in face-to-face contact with the patient and in the care of the patient on the floor/unit where the patient is located.  
 
Roberto Beaulieu MD

## 2020-12-29 PROBLEM — N30.00 ACUTE CYSTITIS WITHOUT HEMATURIA: Status: ACTIVE | Noted: 2020-01-01

## 2020-12-29 NOTE — PROGRESS NOTES
Robert Anthony Admission Date: 12/24/2020 Daily Progress Note: 12/29/2020 The patient's chart is reviewed and the patient is discussed with the staff. Patient has DM/CAD/HTN/CKD and presented with dyspnea. Saturation were 44% per EMS and in ER was placed on BIPAP since did not improve with NC. On 100% BIPAP and saturation only 93%. Recent covid exposure. Here the rapid Covid test was positive. ABG noted pH 7.31/41/70 on BIPAP 100%. CXR with diffuse infiltrates. Also noted worsening kidney function. Admitted to ICU 12/24. Given plasma and steroids. Creatinine too high for remdesivir. Nephrology consulted 12/26 for worsening renal failure. Subjective:  
 
Patient currently on airvo 100% satting in the low 90s. Was on bipap overnight. BUN and creatinine beginning to come down. K high today at 5.8. UOP up to 1500. Seen by nephrology this morning and plans for ongoing monitoring with no need for dialysis at this point, lokelma for K. Current Facility-Administered Medications Medication Dose Route Frequency  dextrose 40% (GLUTOSE) oral gel 1 Tube  15 g Oral PRN  
 glucagon (GLUCAGEN) injection 1 mg  1 mg IntraMUSCular PRN  
 dextrose (D50W) injection syrg 12.5-25 g  25-50 mL IntraVENous PRN  
 albuterol (PROVENTIL HFA, VENTOLIN HFA, PROAIR HFA) inhaler 2 Puff  2 Puff Inhalation Q4H PRN  
 sodium zirconium cyclosilicate (LOKELMA) powder packet 10 g  10 g Oral NOW  tuberculin injection 5 Units  5 Units IntraDERMal ONCE  
 ascorbic acid (vitamin C) (VITAMIN C) tablet 1,000 mg  1,000 mg Oral BID  cholecalciferol (VITAMIN D3) (1000 Units /25 mcg) tablet 5 Tab  5,000 Units Oral DAILY  insulin lispro (HUMALOG) injection 0-15 Units  0-15 Units SubCUTAneous AC&HS  insulin glargine (LANTUS) injection 20 Units  20 Units SubCUTAneous BID  phenol throat spray (CHLORASEPTIC) 1 Spray  1 Spray Oral PRN  
  lip protectant (BLISTEX) ointment 1 Each  1 Each Topical PRN  
 acetaminophen (TYLENOL) tablet 650 mg  650 mg Oral Q6H PRN  
 cefTRIAXone (ROCEPHIN) 1 g in 0.9% sodium chloride (MBP/ADV) 50 mL MBP  1 g IntraVENous Q24H  
 0.9% sodium chloride infusion 250 mL  250 mL IntraVENous PRN  
 sodium chloride (NS) flush 5-40 mL  5-40 mL IntraVENous Q8H  
 sodium chloride (NS) flush 5-40 mL  5-40 mL IntraVENous PRN  
 heparin (porcine) injection 5,000 Units  5,000 Units SubCUTAneous Q8H  
 pantoprazole (PROTONIX) 40 mg in 0.9% sodium chloride 10 mL injection  40 mg IntraVENous DAILY  dexamethasone (DECADRON) 4 mg/mL injection 6 mg  6 mg IntraVENous DAILY  zinc sulfate tablet 220 mg  220 mg Oral DAILY  albuterol (PROVENTIL VENTOLIN) nebulizer solution 2.5 mg  2.5 mg Nebulization Q6H RT  
 
 
Review of Systems Constitutional: negative for fever, chills, sweats Cardiovascular: negative for chest pain, palpitations, syncope, edema Gastrointestinal: negative for dysphagia, reflux, vomiting, diarrhea, abdominal pain, or melena Neurologic: negative for focal weakness, numbness, headache Objective:  
 
Vitals:  
 12/29/20 0830 12/29/20 0805 12/29/20 0901 12/29/20 6266 BP: (!) 179/86 (!) 178/90 (!) 184/96 (!) 175/104 Pulse: 67 71 70 67 Resp: 20 27 (!) 32 30 Temp: (!) 94.1 °F (34.5 °C) SpO2: 94% 93% 92% 92% Weight:      
Height:      
 
Intake and Output:  
12/27 1901 - 12/29 0700 In: 2533 [P.O.:221; I.V.:1135] [facility]: 1881 [id number] 12/29 0701 - 12/29 1900 In: 240 [P.O.:240] Out: 255 [Urine:255] Physical Exam:  
Constitution:  the patient is well developed and in no acute distress EENMT:  Sclera clear, pupils equal, oral mucosa moist 
Respiratory: CTA B, no w/r/r 
Cardiovascular:  RRR without M,G,R 
Gastrointestinal: soft and non-tender; with positive bowel sounds. Musculoskeletal: warm without cyanosis. There is no lower leg edema. Skin:  no jaundice or rashes, no wounds Neurologic: no gross neuro deficits Psychiatric:  alert and oriented x ppt CHEST XRAY:  
CXR Results  (Last 48 hours) 12/29/20 0505  XR CHEST PORT Final result Impression:  IMPRESSION: Progressed diffuse bilateral lung infiltrates or edema. Narrative:  EXAM: Chest x-ray. INDICATION: Dyspnea. Covid 19. COMPARISON: December 24, 2020. TECHNIQUE: Frontal view chest x-ray. FINDINGS: Diffuse bilateral lung infiltrates or edema have progressed. Again  
noted is cardiomegaly and a prior sternotomy. No pneumothorax or pleural  
effusion is seen. LAB No lab exists for component: [place] Recent Labs  
  12/29/20 
0353 12/28/20 
0559 12/27/20 
0256 WBC 14.8* 13.0* 11.7* HGB 10.9* 10.0* 10.1* HCT 32.5* 30.9* 31.4*  221 212 Recent Labs  
  12/29/20 
0353 12/28/20 
0559 12/27/20 
0256  139 140  
K 5.8* 4.5 4.5  
* 109* 112* CO2 23 22 22 GLU 61* 197* 145* BUN 80* 82* 79* CREA 3.22* 3.78* 3.56* MG  --   --  2.8*  
CA 8.3 8.2* 8.3 PHOS  --   --  4.0* ABG:  No results found for: PH, PHI, PCO2, PCO2I, PO2, PO2I, HCO3, HCO3I, FIO2, FIO2I 
 
 
MICRO 
 
SARS-CoV-2 LAB Results [facility] Test: No results found for: COV2NT  
[facility] Test: No results found for: [facility] Test: No components found for: [id number] Urine - 50-100k CFU e coli. 10-50 CFU skin farheen. Assessment:  (Medical Decision Making) Hospital Problems  Date Reviewed: 8/10/2019 Codes Class Noted POA Acute cystitis without hematuria ICD-10-CM: N30.00 ICD-9-CM: 595.0  12/29/2020 Unknown COVID-19 ICD-10-CM: U07.1 ICD-9-CM: 079.89  12/24/2020 Unknown * (Principal) Acute respiratory distress syndrome (ARDS) due to severe acute respiratory syndrome coronavirus 2 (SARS-CoV-2) ([facility]) ICD-10-CM: U07.1, J80 
ICD-9-CM: 518.82, 079.89  12/24/2020 Unknown  Acute hypoxemic respiratory failure (HCC) ICD-10-CM: J96.01 
 ICD-9-CM: 518.81  12/24/2020 Unknown Acute on chronic renal failure (HCC) ICD-10-CM: N17.9, N18.9 ICD-9-CM: 584.9, 585.9  9/17/2019 Unknown Coronary artery disease involving coronary bypass graft of native heart without angina pectoris (Chronic) ICD-10-CM: K24.038 ICD-9-CM: 414.05  6/15/2015 Yes Uncontrolled type 2 diabetes mellitus with hyperglycemia (HCC) (Chronic) ICD-10-CM: E11.65 ICD-9-CM: 250.02  6/15/2015 Yes Diabetes mellitus with hyperosmolarity without hyperglycemic hyperosmolar nonketotic coma (HCC) (Chronic) ICD-10-CM: E11.00 ICD-9-CM: 250.20  11/24/2014 Yes Patient with ARDS, severe acute hypoxic due to COVID PNA. Seems to be improving somewhat from admission but still on very high levels of O2 through airvo and bipap at night. Plan:  (Medical Decision Making)  
-cont dexamethasone, EOT 1/2.  
-not a candidate for remdesivir due to PRATIMA. -monitoring renal function, lokelma for hyperkalemia.  
-wean o2 as sats tolerate. When come down on daytime o2 will try off nocturnal bipap.  
-on glargine 20 units bid and ssi. Glucose today seems relatively well controlled on this regimen though was in the 200-300's a day or two ago. Cont to monitor and adjust as needed.  
-cont heparin 5000 q 8 
-cont protonix.  
-getting ceftriaxone for e coli in urine.   
 
Humza Barrientos MD

## 2020-12-29 NOTE — PROGRESS NOTES
[facility] PROGRESS NOTE Follow up for: PRATIMA Subjective:  
Patient seen and examined in ICU COVID unit sitting up in bed eating breakfast, remains on 99% Airvo, + exertional SOB, cough, weakness. Labs and chart reviewed, SHAMIKA primary RN IVF stopped last night with HTN, non oliguric via coyle, renal function better ROS: 
General: no fever/chills, appetite better CV; no CP Lung: + SOB, + cough GI: no N/V/D Ext; no edema Objective:  
Exam: 
Vitals:  
 12/29/20 0830 12/29/20 0854 12/29/20 0901 12/29/20 2186 BP: (!) 179/86 (!) 178/90 (!) 184/96 (!) 175/104 Pulse: 67 71 70 67 Resp: 20 27 (!) 32 30 Temp: (!) 94.1 °F (34.5 °C) SpO2: 94% 93% 92% 92% Weight:      
Height:      
 
 
 
Intake/Output Summary (Last 24 hours) at 12/29/2020 1004 Last data filed at 12/29/2020 0830 Gross per 24 hour Intake 1115 ml Output 1711 ml Net -596 ml  
 
 
Current Facility-Administered Medications Medication Dose Route Frequency  dextrose 40% (GLUTOSE) oral gel 1 Tube  15 g Oral PRN  
 glucagon (GLUCAGEN) injection 1 mg  1 mg IntraMUSCular PRN  
 dextrose (D50W) injection syrg 12.5-25 g  25-50 mL IntraVENous PRN  
 albuterol (PROVENTIL HFA, VENTOLIN HFA, PROAIR HFA) inhaler 2 Puff  2 Puff Inhalation Q4H RT  
 tuberculin injection 5 Units  5 Units IntraDERMal ONCE  
 ascorbic acid (vitamin C) (VITAMIN C) tablet 1,000 mg  1,000 mg Oral BID  cholecalciferol (VITAMIN D3) (1000 Units /25 mcg) tablet 5 Tab  5,000 Units Oral DAILY  insulin lispro (HUMALOG) injection 0-15 Units  0-15 Units SubCUTAneous AC&HS  insulin glargine (LANTUS) injection 20 Units  20 Units SubCUTAneous BID  phenol throat spray (CHLORASEPTIC) 1 Spray  1 Spray Oral PRN  
 lip protectant (BLISTEX) ointment 1 Each  1 Each Topical PRN  
 acetaminophen (TYLENOL) tablet 650 mg  650 mg Oral Q6H PRN  
 cefTRIAXone (ROCEPHIN) 1 g in 0.9% sodium chloride (MBP/ADV) 50 mL MBP  1 g IntraVENous Q24H  0.9% sodium chloride infusion 250 mL  250 mL IntraVENous PRN  
 sodium chloride (NS) flush 5-40 mL  5-40 mL IntraVENous Q8H  
 sodium chloride (NS) flush 5-40 mL  5-40 mL IntraVENous PRN  
 heparin (porcine) injection 5,000 Units  5,000 Units SubCUTAneous Q8H  
 pantoprazole (PROTONIX) 40 mg in 0.9% sodium chloride 10 mL injection  40 mg IntraVENous DAILY  dexamethasone (DECADRON) 4 mg/mL injection 6 mg  6 mg IntraVENous DAILY  zinc sulfate tablet 220 mg  220 mg Oral DAILY  albuterol (PROVENTIL VENTOLIN) nebulizer solution 2.5 mg  2.5 mg Nebulization Q6H RT  
 
 
EXAM 
GEN - Alert, oriented, in no distress CV - S1, S2, Regular rate, no Rub Lung - diminished lung sounds bilaterally Abd - soft, nontender, BS present Ext - no edema Recent Labs  
  12/29/20 
0353 12/28/20 
0559 12/27/20 
0256 WBC 14.8* 13.0* 11.7* HGB 10.9* 10.0* 10.1* HCT 32.5* 30.9* 31.4*  221 212 Recent Labs  
  12/29/20 
0353 12/28/20 
0559 12/27/20 
0256  139 140  
K 5.8* 4.5 4.5  
* 109* 112* CO2 23 22 22 BUN 80* 82* 79* CREA 3.22* 3.78* 3.56* CA 8.3 8.2* 8.3 GLU 61* 197* 145* MG  --   --  2.8* PHOS  --   --  4.0* Assessment and Plan: 1. PRATIMA over CKD Possibly COVID ATN , non oliguric Baseline 1 to 1.5 , admitted with Cr of 2.8 on 12/24 UA proteinuria  Seen , yeast present  , Urine chemistries wnl , On  gentle hydration and watch O2 sat closely - Echo from 2019 - Normal EF and systolic function Renal function improving, non oliguric, no indication for acute RRT Trending renal function with strict I&O  
  
2.COVID 19 + AirVo and BiPap @ HS, on dexamethasone Not a candidate for remdesivir On rocephin, azithromycin  
  
3. Anemia stable  
  
4. Hypermagnesemia and phosphatemia seen sec to PRATIMA 5. Hyperkalemia add robert hoffman NP

## 2020-12-30 PROBLEM — I46.9 CARDIAC ARREST (HCC): Status: ACTIVE | Noted: 2020-01-01

## 2020-12-30 NOTE — PROGRESS NOTES
Tracy Saini Admission Date: 12/24/2020 Daily Progress Note: 12/30/2020 The patient's chart is reviewed and the patient is discussed with the staff. Patient has DM/CAD/HTN/CKD and presented with dyspnea. Saturation were 44% per EMS and in ER was placed on BIPAP since did not improve with NC. On 100% BIPAP and saturation only 93%. Recent covid exposure. Here the rapid Covid test was positive. ABG noted pH 7.31/41/70 on BIPAP 100%. CXR with diffuse infiltrates. Also noted worsening kidney function. Admitted to ICU 12/24. Given plasma and steroids. Creatinine too high for remdesivir. Nephrology consulted 12/26 for worsening renal failure. Subjective: CODE HOLLI called this AM with pt's eyes rolling back and no definite pulse for 5-10 seconds. Came back immediately with precordial thump per nursing. Was more tachypneic overnight and mentation reportedly worse. Pt intubated for airway protection by me. Was on low dose precedex with bradycardia prior to event. Current Facility-Administered Medications Medication Dose Route Frequency  dexmedeTOMidine (PRECEDEX) 400 mcg in 0.9% sodium chloride 100 mL infusion  0.1-1.5 mcg/kg/hr IntraVENous TITRATE  midazolam (VERSED) 1 mg/mL injection  propofoL (DIPRIVAN) 10 mg/mL injection  rocuronium injection 7.2 mg  0.1 mg/kg IntraVENous NOW  fentaNYL in normal saline (pf) 25 mcg/mL infusion  0-200 mcg/hr IntraVENous TITRATE  midazolam in normal saline (VERSED) 1 mg/mL infusion  0-10 mg/hr IntraVENous TITRATE  dextrose 40% (GLUTOSE) oral gel 1 Tube  15 g Oral PRN  
 glucagon (GLUCAGEN) injection 1 mg  1 mg IntraMUSCular PRN  
 dextrose (D50W) injection syrg 12.5-25 g  25-50 mL IntraVENous PRN  
 albuterol (PROVENTIL HFA, VENTOLIN HFA, PROAIR HFA) inhaler 2 Puff  2 Puff Inhalation Q4H PRN  
 hydrALAZINE (APRESOLINE) tablet 10 mg  10 mg Oral TID  hydrALAZINE (APRESOLINE) 20 mg/mL injection 20 mg  20 mg IntraVENous Q2H PRN  
 ascorbic acid (vitamin C) (VITAMIN C) tablet 1,000 mg  1,000 mg Oral BID  cholecalciferol (VITAMIN D3) (1000 Units /25 mcg) tablet 5 Tab  5,000 Units Oral DAILY  insulin lispro (HUMALOG) injection 0-15 Units  0-15 Units SubCUTAneous AC&HS  insulin glargine (LANTUS) injection 20 Units  20 Units SubCUTAneous BID  phenol throat spray (CHLORASEPTIC) 1 Spray  1 Spray Oral PRN  
 lip protectant (BLISTEX) ointment 1 Each  1 Each Topical PRN  
 acetaminophen (TYLENOL) tablet 650 mg  650 mg Oral Q6H PRN  
 cefTRIAXone (ROCEPHIN) 1 g in 0.9% sodium chloride (MBP/ADV) 50 mL MBP  1 g IntraVENous Q24H  
 0.9% sodium chloride infusion 250 mL  250 mL IntraVENous PRN  
 sodium chloride (NS) flush 5-40 mL  5-40 mL IntraVENous Q8H  
 sodium chloride (NS) flush 5-40 mL  5-40 mL IntraVENous PRN  
 heparin (porcine) injection 5,000 Units  5,000 Units SubCUTAneous Q8H  
 pantoprazole (PROTONIX) 40 mg in 0.9% sodium chloride 10 mL injection  40 mg IntraVENous DAILY  dexamethasone (DECADRON) 4 mg/mL injection 6 mg  6 mg IntraVENous DAILY  zinc sulfate tablet 220 mg  220 mg Oral DAILY  albuterol (PROVENTIL VENTOLIN) nebulizer solution 2.5 mg  2.5 mg Nebulization Q6H RT  
 
 
Review of Systems Constitutional: negative for fever, chills, sweats Cardiovascular: negative for chest pain, palpitations, syncope, edema Gastrointestinal: negative for dysphagia, reflux, vomiting, diarrhea, abdominal pain, or melena Neurologic: negative for focal weakness, numbness, headache Objective:  
 
Vitals:  
 12/30/20 0759 12/30/20 0800 12/30/20 0803 12/30/20 5744 BP: (!) 165/92  (!) 163/85 (!) 145/73 Pulse: 62 (!) 59 (!) 59 72 Resp: (!) 33 (!) 54 (!) 39 26 Temp:      
SpO2: 100% 100% 100% (!) 75% Weight:      
Height:      
 
Intake and Output:  
12/28 1901 - 12/30 0700 In: 1869 [P.O.:960; I.V.:909] Out: 7477 Adam Sadler No intake/output data recorded. Physical Exam:  
Constitution:  the patient is well developed and in no acute distress EENMT:  Sclera clear, pupils equal, oral mucosa moist 
Respiratory: few scattered crackles Cardiovascular:  RRR without M,G,R 
Gastrointestinal: soft and non-tender; with positive bowel sounds. Musculoskeletal: warm without cyanosis. There is no lower leg edema. Skin:  no jaundice or rashes, no wounds Neurologic: no gross neuro deficits Psychiatric:  Periodically agitated CHEST XRAY:  
 
12/30: 
 
 
 
 
 
 
CXR Results  (Last 48 hours) 12/30/20 0250  XR CHEST SNGL V Final result Impression:  IMPRESSION: Unchanged bilateral lung edema or infiltrates. Narrative:  EXAM: Chest x-ray. INDICATION: Dyspnea. Covid 19. COMPARISON: Yesterday's chest x-ray. TECHNIQUE: Frontal view chest x-ray. FINDINGS: Diffuse bilateral lung edema or infiltrates are unchanged. The cardiac  
size is stable, with evidence of a prior sternotomy. No pneumothorax or pleural  
effusion is seen. 12/29/20 0505  XR CHEST PORT Final result Impression:  IMPRESSION: Progressed diffuse bilateral lung infiltrates or edema. Narrative:  EXAM: Chest x-ray. INDICATION: Dyspnea. Covid 19. COMPARISON: December 24, 2020. TECHNIQUE: Frontal view chest x-ray. FINDINGS: Diffuse bilateral lung infiltrates or edema have progressed. Again  
noted is cardiomegaly and a prior sternotomy. No pneumothorax or pleural  
effusion is seen. LAB No lab exists for component: [place] Recent Labs 12/30/20 
7387 12/29/20 
5066 12/28/20 
0559 WBC 15.4* 14.8* 13.0* HGB 11.9 10.9* 10.0* HCT 35.8 32.5* 30.9*  260 221 Recent Labs 12/30/20 
0258 12/29/20 
1110 12/29/20 
0353  139 139  
K 4.8 4.8 5.8*  
* 109* 111* CO2 22 22 23 * 165* 61* BUN 78* 78* 80* CREA 3.18* 3.12* 3.22* MG 2.8*  --   --   
CA 8.3 8.3 8.3 PHOS 4.3*  --   --   
 
ABG:   
Lab Results Component Value Date/Time PHI 7.30 (L) 12/30/2020 02:13 AM  
 PCO2I 42.5 12/30/2020 02:13 AM  
 PO2I 66 (L) 12/30/2020 02:13 AM  
 HCO3I 21.1 (L) 12/30/2020 02:13 AM  
 FIO2I 80 12/30/2020 02:13 AM  
 
 
 
MICRO 
 
SARS-CoV-2 LAB Results [facility] Test: No results found for: COV2NT  
[facility] Test: No results found for: [facility] Test: No components found for: [id number] Urine - 50-100k CFU e coli. 10-50 CFU skin farheen. Assessment:  (Medical Decision Making) Hospital Problems  Date Reviewed: 8/10/2019 Codes Class Noted POA Cardiac arrest [facility]) ICD-10-CM: I46.9 ICD-9-CM: 427.5  12/30/2020 Unknown Only lasted about 10 seconds on monitor but desat into 20's came right back. Acute cystitis without hematuria ICD-10-CM: N30.00 ICD-9-CM: 595.0  12/29/2020 Unknown COVID-19 ICD-10-CM: U07.1 ICD-9-CM: 079.89  12/24/2020 Unknown Ongoing. * (Principal) Acute respiratory distress syndrome (ARDS) due to severe acute respiratory syndrome coronavirus 2 (SARS-CoV-2) ([facility]) ICD-10-CM: U07.1, J80 
ICD-9-CM: 518.82, 079.89  12/24/2020 Unknown Acute hypoxemic respiratory failure ([facility] [address] ICD-10-CM: J96.01 
ICD-9-CM: 518.81  12/24/2020 Unknown Worse. Sats poor overnight. Now intubated. Acute on chronic renal failure (HCC) ICD-10-CM: N17.9, N18.9 ICD-9-CM: 584.9, 585.9  9/17/2019 Unknown Cr coming down. Coronary artery disease involving coronary bypass graft of native heart without angina pectoris (Chronic) ICD-10-CM: K21.836 ICD-9-CM: 414.05  6/15/2015 Yes Uncontrolled type 2 diabetes mellitus with hyperglycemia (HCC) (Chronic) ICD-10-CM: E11.65 ICD-9-CM: 250.02  6/15/2015 Yes Diabetes mellitus with hyperosmolarity without hyperglycemic hyperosmolar nonketotic coma (HCC) (Chronic) ICD-10-CM: E11.00 ICD-9-CM: 250.20  11/24/2014 Yes Patient with ARDS, severe acute hypoxic due to COVID PNA. Brief arrest this AM and now intubated for ongoing hypoxemia and respiratory instability. Plan:  (Medical Decision Making)  
 
-full vent support for now. 
-stop precedex and place on versed and fentanyl drips. -cont dexamethasone, EOT 1/2.  
-not a candidate for remdesivir due to PRATIMA. -monitoring renal function, lokelma for hyperkalemia. Cr down today.  
-wean o2 as tolerated 
-cont heparin 5000 q 8 
-cont protonix.  
-getting ceftriaxone for e coli in urine. -CVL for drips Called daughter Louisa Rodgers with update [phone]) Critically ill. E/M 40 minutes not including intubation.  
 
Shoaib Tomas MD

## 2020-12-30 NOTE — PROGRESS NOTES
[facility] PROGRESS NOTE Follow up for: PRATIMA Subjective:  
Patient seen and examined in ICU COVID unit now intubated after brief arrest. 
 
ROS: 
General: no fever/chills, appetite better CV; no CP Lung: + SOB, + cough GI: no N/V/D Ext; no edema Objective:  
Exam: 
Vitals:  
 12/30/20 1100 12/30/20 1129 12/30/20 1130 12/30/20 1200 BP: 139/70 133/62  130/64 Pulse: 62 68 62 66 Resp: 15  (!) 0 (!) 0 Temp:      
SpO2: 100% 99% 98% 98% Weight:      
Height:      
 
 
 
Intake/Output Summary (Last 24 hours) at 12/30/2020 1237 Last data filed at 12/30/2020 0800 Gross per 24 hour Intake 830 ml Output 1085 ml Net -255 ml Current Facility-Administered Medications Medication Dose Route Frequency  propofoL (DIPRIVAN) 10 mg/mL injection  rocuronium injection 7.2 mg  0.1 mg/kg IntraVENous NOW  fentaNYL in normal saline (pf) 25 mcg/mL infusion  0-200 mcg/hr IntraVENous TITRATE  midazolam in normal saline (VERSED) 1 mg/mL infusion  0-10 mg/hr IntraVENous TITRATE  ascorbic acid (vitamin C) (VITAMIN C) tablet 1,000 mg  1,000 mg Per NG tube BID  cholecalciferol (VITAMIN D3) (1000 Units /25 mcg) tablet 5 Tab  5,000 Units Per NG tube DAILY  hydrALAZINE (APRESOLINE) tablet 10 mg  10 mg Per NG tube TID  zinc sulfate tablet 220 mg  220 mg Per G Tube DAILY  NOREPINephrine (LEVOPHED) 4 mg in 5% dextrose 250 mL infusion  0.5-16 mcg/min IntraVENous TITRATE  DOPamine (INTROPIN) 800 mg in dextrose 5% 250 mL infusion  0-50 mcg/kg/min IntraVENous TITRATE  [START ON 12/31/2020] famotidine (PF) (PEPCID) 20 mg in 0.9% sodium chloride 10 mL injection  20 mg IntraVENous DAILY  dextrose 40% (GLUTOSE) oral gel 1 Tube  15 g Oral PRN  
 glucagon (GLUCAGEN) injection 1 mg  1 mg IntraMUSCular PRN  
 dextrose (D50W) injection syrg 12.5-25 g  25-50 mL IntraVENous PRN  
 albuterol (PROVENTIL HFA, VENTOLIN HFA, PROAIR HFA) inhaler 2 Puff  2 Puff Inhalation Q4H PRN  
  hydrALAZINE (APRESOLINE) 20 mg/mL injection 20 mg  20 mg IntraVENous Q2H PRN  
 insulin lispro (HUMALOG) injection 0-15 Units  0-15 Units SubCUTAneous AC&HS  insulin glargine (LANTUS) injection 20 Units  20 Units SubCUTAneous BID  phenol throat spray (CHLORASEPTIC) 1 Spray  1 Spray Oral PRN  
 lip protectant (BLISTEX) ointment 1 Each  1 Each Topical PRN  
 acetaminophen (TYLENOL) tablet 650 mg  650 mg Oral Q6H PRN  
 cefTRIAXone (ROCEPHIN) 1 g in 0.9% sodium chloride (MBP/ADV) 50 mL MBP  1 g IntraVENous Q24H  
 0.9% sodium chloride infusion 250 mL  250 mL IntraVENous PRN  
 sodium chloride (NS) flush 5-40 mL  5-40 mL IntraVENous Q8H  
 sodium chloride (NS) flush 5-40 mL  5-40 mL IntraVENous PRN  
 heparin (porcine) injection 5,000 Units  5,000 Units SubCUTAneous Q8H  
 dexamethasone (DECADRON) 4 mg/mL injection 6 mg  6 mg IntraVENous DAILY  albuterol (PROVENTIL VENTOLIN) nebulizer solution 2.5 mg  2.5 mg Nebulization Q6H RT  
 
 
EXAM 
GEN - Alert, oriented, in no distress CV - S1, S2, Regular rate, no Rub Lung - diminished lung sounds bilaterally Abd - soft, nontender, BS present Ext - no edema Recent Labs 12/30/20 
0136 12/29/20 
8208 12/28/20 
0559 WBC 15.4* 14.8* 13.0* HGB 11.9 10.9* 10.0* HCT 35.8 32.5* 30.9*  260 221 Recent Labs 12/30/20 
0258 12/29/20 
1110 12/29/20 
0353  139 139  
K 4.8 4.8 5.8*  
* 109* 111* CO2 22 22 23 BUN 78* 78* 80* CREA 3.18* 3.12* 3.22* CA 8.3 8.3 8.3 * 165* 61* MG 2.8*  --   --   
PHOS 4.3*  --   --   
 
 
Assessment and Plan: 1. PRATIMA over CKD Possibly COVID ATN , non oliguric Baseline 1 to 1.5 , admitted with Cr of 2.8 on 12/24 On  gentle hydration and watch O2 sat closely - Echo from 2019 - Normal EF and systolic function Renal function stable, non oliguric, no indication for acute RRT Trending renal function with strict I&O  
  
2.COVID 19 + AirVo and BiPap @ HS, on dexamethasone Not a candidate for remdesivir On rocephin, azithromycin  
  
3. Anemia stable  
  
4. Hypermagnesemia and phosphatemia seen sec to PRATIMA 5. Hyperkalemia add lokelma, resolved Justina Garcia MD

## 2020-12-30 NOTE — PROGRESS NOTES
Patient was intubated with a number 7.5 ET Tube. Tube placement verified by auscultation and ETCO2 monitor. ET Tube is secured at the 22 cm domenico at the lip and on the right side. Patient was intubated by Dr Kenney Salomon on the 1 attempt. Breath sounds are diminished. Patient is Negative for subcutaneous air and chest excursion is symmetric. Trachea is midline. Patient is also Negative for cyanosis and is Negative for pitting edema. Patient placed on ventilator on documented settings. All alarms are set and audible. Resuscitation bag is at the head of the bed. Ventilator Settings Mode FIO2 Rate Tidal Volume Pressure PEEP I:E Ratio VC+  100 %   20 400 ml     12 cm H20  1:2.3 Peak airway pressure: 35 cm H2O Minute ventilation: 10.9 l/min ABG: No results for input(s): PH, PCO2, PO2, HCO3 in the last 72 hours.

## 2020-12-30 NOTE — PROGRESS NOTES
12/30/20 0138 Oxygen Therapy O2 Sat (%) 93 % Pulse via Oximetry 111 beats per minute O2 Device BIPAP  
FIO2 (%) 100 % Respiratory Respiratory (WDL) X Respiratory Pattern Shallow; Tachypneic Chest/Tracheal Assessment Chest expansion, symmetrical  
Breath Sounds Bilateral Diminished;Coarse Cough Weak;Non-productive CPAP/BIPAP  
CPAP/BIPAP Start/Stop On Device Mode BIPAP  
$$ Bipap Daily Yes Mask Type and Size Full face; Medium Skin Condition intact PIP Observed 24 cm H20 IPAP (cm H2O) 14 cm H2O  
EPAP (cm H2O) 18 cm H2O Inspiratory Time (sec) 1 seconds Vt Spont (ml) 395 ml Ve Observed (l/min) 13.1 l/min Backup Rate 16 Total RR (Spontaneous) 33 breaths per minute Insp Rise Time (sec) 3 Leak (Estimated) 9 L/min  
Pt's Home Machine No  
Biomedical Check Performed Yes Settings Verified Yes Alarm Settings High Pressure 35 Low Pressure 5 Apnea 20 Low Ve 4 High Rate 60 Low Rate 8 Pulmonary Toilet Pulmonary Toilet H. O.B elevated Patient desaturation due to addition of precedex for tachypnea. Precedex was more effective than previous sedatives so I had to increase support on BIPAP to adequately oxygenate and ventilate patient.

## 2020-12-30 NOTE — PROGRESS NOTES
Heart rate remains low and BP borderline off precedex. Will ask cardiology to see and check troponin x 1.   
 
Waldo Ludwig MD

## 2020-12-30 NOTE — PROGRESS NOTES
I spoke to Dr. Mick To regarding the pt's status. She has her eyes closed, but she is still breathing in the high 40s low 50s per min. She is still tachy, hypertensive and hypothermic. He ordered a STAT ABG and Precedex. Orders verified and implemented. RT made aware of the STAT order.

## 2020-12-30 NOTE — PROGRESS NOTES
While in patient room for morning assessment, patients hr dropped to low 40's, eyes rolled back in head followed by asystole. Code blue called. No pulse palpated by 2x RN's. CODE BLUE called. CPR started. Patient hr returned with brief compressions. MD De La Rosa Car now at bedside and plans for intubation for airway protection

## 2020-12-30 NOTE — PROCEDURES
Central Line Placement Start time: 12/30/2020 9:15 AM 
End time: 12/30/2020 9:30 AM 
Performed by: Ning Kuo MD 
Authorized by: Ning Kuo MD  
 
Indications: vascular access, central pressure monitoring, need for vasopressors and sepsis protocol Preanesthetic Checklist: patient identified, risks and benefits discussed, anesthesia consent, site marked, patient being monitored and timeout performed Timeout Time: 09:15 Pre-procedure: All elements of maximal sterile barrier technique followed? Yes   
2% Chlorhexidine for cutaneous antisepsis, Hand hygiene performed prior to catheter insertion and Ultrasound guidance Sterile Ultrasound Technique followed?: Yes Ultrasound Image Stored? Image stored Procedure:  
Prep:  Chlorhexidine Location: internal jugular Orientation:  Left Patient position:  Flat Catheter type:  Quad lumen Catheter size:  8.5 Fr 
 
Number of attempts:  1 Successful placement: Yes Assessment:  
Post-procedure:  Catheter secured and sterile dressing applied Assessment:  Blood return through all ports, free fluid flow and guidewire removal verified Insertion:  Uncomplicated Patient tolerance:  Patient tolerated the procedure well with no immediate complications Potential access sites were examined with ultrasound and the acceptable patent access site was selected (site noted above). The needle path and vein access were visualized in real time using ultrasonography and an image of the wire in the vessel was recorded for permanent record. Vessel cannulation was confirmed with manometry. Catheter was advanced without difficulty and guidewire was removed. Blood return was confirmed in all lumens. Catheter was sutured and sterile tegaderm was applied. Patient tolerated the procedure well and chest x-ray to be obtained.

## 2020-12-30 NOTE — PROGRESS NOTES
Bedside, Verbal and Written shift change report given to NANCY RN  (oncoming nurse) by Ramona Cosme RN  (offgoing nurse). Report included the following information SBAR, Kardex, ED Summary, Procedure Summary, Intake/Output, MAR, Recent Results, Med Rec Status, Cardiac Rhythm SB, Alarm Parameters  and Quality Measures Thien Delgado

## 2020-12-30 NOTE — PROGRESS NOTES
Endotracheal Intubation Procedure Note Indication for endotracheal intubation: respiratory failure, brief cardiac arrest 
Sedation: fentanyl 100 ugm and etomidate o.3 mg/kg Paralytic: None Lidocaine: no 
Atropine: no 
Equipment: Julio 3 laryngoscope blade and Glidescope. and 7.5mm cuffed endotracheal tube. Cricoid Pressure: no 
Number of attempts: 1 ETT location confirmed by by auscultation and ETCO2 monitor. CXR with good position of ETT above [place].  
 
Hanna Porter MD

## 2020-12-30 NOTE — PROGRESS NOTES
All shift the pt has been in and out of orientation. Per report the pt's hasn't slept over the last several days. Because of this, I asked Dr. Tony Israel if he would like to give the pt a sleeping pill because I have seen this behavior before in patients that haven't slept over several nights. He gave me an order for 5mg of Ambien one time which I verified and implemented. I bathed the pt, gave her a snack, 2 glasses of fresh ice water and her meds before making sure she had the phone and call light within reach. I also showed and demonstrated how she can turn the light on and off from her call light. The TV was left on and the lights were turned off before I left the room after verifying she did not need anything else. That was at 2230. She has since had 2 BMs without using the call light, has pulled off her BiPap mask and half of her gown, has uncovered herself many times even though I remind her every time I recover her that her temp is very cold right now and it is important that she keep the covers on. She asks for water and I explain to her that she has already had 3 full glasses of water and with her respiratory rate being in the 50s she is too SOB to be off of the BiPap at this time. I have readjusted her mask numerous times because she says it is leaking, even though the BiPap shows no leaks and I myself don't feel any air escaping. I am now sitting outside her room watching her to make sure she remains safe.

## 2020-12-30 NOTE — PROGRESS NOTES
Chart reviewed by [facility] Patient was intubated this am s/p code Current Vent settings 100%Fi02  VC+ Patient will need a PT and OT evaluation once patient is medically stable to participate PPD ordered 12/28 Will continue to follow for discharge planning needs Please consult  if any new issues arise

## 2020-12-30 NOTE — ROUTINE PROCESS
Respiratory Care Services Policy Number: -[id number] Title: Oxygen Protocol Effective Date: 01/1996 Revised Date: 06/2013, 02/29/2016, 4/2018, 7/2019 Reviewed Date: 05/2014, 03/2015, 06/2017 I. Policy: The Oxygen Protocol will be initiated for all patients upon written order from physician for administration of oxygen therapy or if a patient is found to have an oxygen saturation of 88% or less. Special consideration: the goal of oxygen therapy for COPD patients is to maintain oxygen saturation between 88% - 92% to comply with GOLD Guidelines. II. Purpose: To provide protocol driven respiratory therapy for the administration of oxygen at concentrations greater than that in ambient air with the intent of treating or preventing the symptoms and manifestations of hypoxia. III. Responsibility: Director Respiratory Care Services, all Respiratory Care Practitioners IV. Indications: A. Implement this protocol for patients when physician orders oxygen to be administered or when patient is found to have an oxygen saturation of 88% or less. B. To assure routine monitoring of patient's oxygen saturation b.i.d. and to make appropriate adjustments in accordance with ordered oxygen saturation parameters. C. To assure continuity of respiratory care that meets [facility] Clinical Practice Guidelines and GOLD Guidelines. D. Hb < 8 
E. Sickle Cell anemia crisis V. Assessment:  Assess the following parameters to determine the need to adjust oxygen: A. Measurement of patient's oxygen saturation via pulse oximetry. B. Observation of patient's color, respiratory effort, and responsiveness. C. Measurement of heart rate and respiratory rate. D. Complete a three-step ambulatory oxygen saturation when ordered. VI. Initiation:  Upon receipt of an order for oxygen, the RCP will: A. Verify order in the patient's EMR, which should include the desired oxygen saturation to be maintained. B. The patient shall be placed on oxygen with humidity (except for those oxygen delivery devices that do not require humidity, i.e. venturi masks and non-rebreather masks) as ordered by the physician to achieve the prescribed oxygen saturation. C. In the event that no saturation is specified, a saturation of 90% will be maintained. D. Patients, who are found to have a SaO2 of 88% or less, may be started on supplemental oxygen as described above. E. Patients admitted with cardiac problems/disease shall be maintained at 92% per Chest Committee recommendation. F. The patient will be informed of the \"no smoking policy\" and instructed in the proper use of oxygen therapy. G. Once desired oxygen saturation has been achieved, the RCP will document FIO2 and oxygen saturation in the respiratory section of the patient's EMR. VII. Maintenance: A. 30-second oxygen saturation check will be taken to maintain the saturation ordered by the physician each day. B. Patients will be assessed each shift and as needed by pulse oximetry to determine if oxygen needs to be decreased, increased or discontinued. C. If changes in FIO2 are indicated, all changes will be documented in the respiratory section of the patient's EMR. D. If no changes in FIO2 are required, the patient's oxygen flow rate and saturation will be recorded in the respiratory section of the patient's EMR. E. Per [facility], patients who are receiving oxygen therapy but are not on oxygen at home, should be weaned off oxygen as soon as possible or when anticipated discharge becomes evident. Latisha Clifton will be discontinued after oxygen saturation has been maintained for 24 hours on room air and documented in the patient's EMR. G. Patients on the Inpatient Rehabilitation area on 9th floor will be exempt from having their oxygen discontinued per protocol. Oxygen may be weaned but will be changed to prn to meet the needs of the patient when exercising and participating in therapy. H. The goal of oxygen therapy is to maintain patients with a diagnosis of COPD at oxygen saturation between 88% - 92% to comply with GOLD Guidelines. VIII. Safety: RCP will address the following safety issues: A. Identify patient using the two patient identifiers name and birth date via ID bracelet. B. Perform hand hygiene per hospital policy utilizing Standard Precautions for all patients and following transmission-based isolation as indicated per hospital policy. C. Cardiac patients will be maintained at an oxygen saturation of 92%. D. If a patient's FIO2 requirements necessitate changing oxygen delivery devices to a high concentration of oxygen, documentation indicating the change must be included in the progress notes, as well as in the respiratory flowsheet. E. If a patient has a hemoglobin level <8 mg. RCP will consult physician before discontinuing oxygen. IX. Interventions: A. RCP will assess patient for signs of respiratory distress or suspicion of CO2 retention. B. An ABG may be obtained for patients exhibiting respiratory distress or sickle cell      crisis. C. An order should be entered into patient's EMR for ABG under per protocol. X. Documentation A. Document assessment findings in the respiratory section of the patient's EMR. B. Document changes in therapy per protocol in the respiratory orders section and in the care plan section of the patient's EMR. C. Document patient education in the patient education section of the patient's EMR. XI. Reportable Conditions:  Report to the physician immediately: A. Acute changes in patient's respiratory status. B. An oxygen saturation <85%. C. A change in oxygen delivery device to provide a high concentration of oxygen. XII. Patient Instructions: Review with Patient A. Purpose of oxygen therapy B. Proper technique for using oxygen C. No smoking policy Approval: Pulmonary Committee (1-25-96) Revision: Chest Committee (4-28-05) L - Respiratory Care Department Policy, Procedure and Protocol Guideline Manual, 1995, KAITLIN Perez. L - Therapist Driven Respiratory Care Protocols  A Practitioner's Guide for Criteria-Based Respiratory Care by Wicho Bradford M.D., and KAITLIN Leo RRT. L - The rationale for therapist-driven protocols: an update. Respiratory Care 1998. N  [facility] Clinical Practice Guidelines. Respiratory Care Services Policy Number: -[id number] Title: Patient Care Assessment Protocol Effective Date: 01/1999 Revised Date: 05/2014, 04/2018, 12/2018, 07/2019 Reviewed Date: 06/2013/ 03/2015, 03/2016, 06/2017 Overview In an effort to improve quality and reduce costs of respiratory care at [facility], the Respiratory Department has developed a number of Patient Care Protocols. These protocols have been developed according to Maruqise 3 and are utilized for those patients who are ordered respiratory therapy using therapeutic indications and standardized approaches for accomplishing objectives. Patient Care Protocols are intended to improve care by: ? Defining the indications and standards of care agreed upon by the Pulmonary Medicine and [facility] CASIMIRO Riddle of [facility]. ? Training respiratory care practitioners to apply those criteria to individual patients and modify therapy as indicated by the protocols. ? Documenting the indication and care plan as part of the initial ordering process. ? Tapering or discontinuing treatments once the indication for therapy changes. The Patient Care Protocols shall be universally applied throughout the hospital as determined by  
the Pulmonary Medicine and [facility] CASIMIRO Riddle. Rationale for Patient Care Assessment Protocols: 
? Continuous Quality Improvement ? Cost containment ? Standardization of care 
? Enhanced continuity of care ? Utilization review ? Timely intervention The following patient care assessment protocols have been developed: ? Aerosolized Medication Protocol http://[email or web address] [facility]s/gvl/stfrancis/policies/Respiratory%20Care%20Services%20Policies/-[id number]. doc ? Bronchial Hygiene Protocol http://[email or web address]/localThe [facility]/gvl/stfrancis/policies/Respiratory%20Care%20Services%20Policies/-[id number]. doc 
? Oxygen Protocolhttp://spweb/localThe [facility]/gvl/stfrancis/policies/Respiratory Care Services Policies/-[id number]. doc http://[email or web address]/localThe [facility]/gv/stfrancis/policies/Respiratory%20Care%20Services%20Policies/-[id number]. doc 
? CVRU Fast Track Weaning Protocol http://spweb/localThe [facility]/gvl/stfrancis/policies/Respiratory%20Care%20Services%20Policies/-[id number]. doc ? Asthma Treatment Protocol ERhttp://spweb/localThe [facility] Hospitalstem[facility]/gvl/stfrancis/policies/Respiratory Care Services Policies/-[id number]. doc http://spb/local[facility]/stfrancis/policies/Respiratory%20Care%20Services%20Policies/-[id number]. doc ? Pediatric Asthma Treatment Protocol ERhttp://spweb/localThe [facility]/gvl/stfrancis/policies/Respiratory Care Services Policies/-[id number]. doc http://spb/localThe [facility] Hospital[facility]/gvl/stfrancis/policies/Respiratory%20Care%20Services%20Policies/-[id number]. doc ? Alpha-1 Antitrypsin Deficiency Protocolhttp://spweb/[facility]/leonel/pam/policies/Respiratory Care Services Policies/-[id number]. doc http://cara/[facility]/pam/policies/Respiratory%20Care%20Services%20Policies/-[id number]. doc 
 ? Prone Positioning Protocol http://[facility]/stfrancis/policies/Respiratory%20Care%20Services%20Policies/-[id number]. doc 
? COPD Protocol http://spb/[facility]/stfrancis/policies/Respiratory%20Care%20Services%20Policies/-[id number]. doc 
? Home Oxygen Assessment Protocolhttp://spLewis [facility] Hospital/[facility]/stfrancis/policies/Respiratory Care Services Policies/-[id number]. doc http://[facility]/stfrancis/policies/Respiratory%20Care%20Services%20Policies/-[id number]. doc 
? Ventilator Weaning Protocol http://spb/[facility]/stfrancis/policies/Respiratory%20Care%20Services%20Policies/-[id number]. doc ? Lung Volume Expansion Protocolhttp://spLewis [facility]/stfrancis/policies/Respiratory Care Services Policies/-[id number]. doc http://[facility]/stfrancis/policies/Respiratory%20Care%20Services%20Policies/-[id number]. docm The Director of [address] [place] [address] oversees the Patient Care Assessment Program. The Respiratory Educator is responsible for protocol development and training. The Supervisor is responsible for implementation and  activities. Each patient with an order for respiratory treatments will receive an evaluation. Respiratory Care Practitioners (RCP's) will perform the evaluations. The same evaluation tool will be utilized for initial and follow-up assessments. If the patient does not meet criteria for ordered therapy, the therapy will be discontinued. If the patient demonstrates an adverse response to initially ordered therapy, the therapy will be discontinued and the physician will be contacted. Specific physician's orders that deviate from protocols and are deemed \"inappropriate\" or \"unsafe\" will be addressed with ordering physician and/or medical director as required. Respiratory Patient Care Assessment Protocols I.  Policy: In an effort to provide quality patient care and effective utilization of services, physicians who order respiratory therapy will have their patients treated via the protocols established (see attached) Respiratory Care Practitioners (RCP's) will complete the initial assessment which will indicate patient needs,  the care plan developed and will performed within 24 hours of admission. Frequency of the therapy will be set according to the results of the respiratory therapy evaluation and frequency guidelines policy. Reassessment will be continued every 48 hours and more frequently as needed for the individual patient. II. Purpose: F. To provide a process that will allow for ongoing assessment and care plan modification for patients receiving respiratory services based on both objective and or subjective patient responses to interventions. This process of protocol utilization will assist in patient care progression while eliminating the need for the physician to continually update respiratory therapy orders. G. To assure continuity of respiratory care that meets [facility] Clinical Practice Guidelines. III. Initiation:  Implement Respiratory Care Protocols for patients who are ordered by physician  
       to receive respiratory therapy procedures or for ventilator management. IV. Protocol: 
E. Upon receiving an order for therapy the RCP will review the patient's EMR (electronic medical record) for all pertinent information includin. Physician's order for therapy 2. Patient history and physical examination 3. Physician progress notes 4. Diagnostic. X-rays, PFT's, arterial blood gases etc. 
F. The RCP will perform a respiratory assessment in the following manner: 1. General observations: color, pattern and effort of breathing, chest expansion, (symmetrical and bilateral), level of consciousness and the ability to ambulate. 2. The RCP will assess patient's cough ability and determine if bronchial hygiene is needed. If patient is unable to produce sputum, at that time, the RCP should question the patient with regard to their sputum: production, color consistency, frequency and amount. 3. Auscultation: Using a stethoscope, the RCP will listen and note quality of breath sounds and presence or absence of adventitious breath sounds in all lung fields, both anteriorly and posteriorly. 4. Upon completing the EMR review and physical assessment, the RCP will document findings in the RT Assessment section of the EMR. The score level will be provided and will be used to determine the frequency of therapy. V. Indications: A. Bronchial Hygiene Protocol indications: 1. Potential for or presence of atelectasis. 1. Need for hydration and removal of retained secretions. 1. Need for improvement of cough effectiveness. 1. Presence of conditions associated with disorder of pulmonary clearance: 
a. Cystic fibrosis b. Bronchiectasis 
c. Neuromuscular disease 
d. Obstructive lung diseases 
e. Restrictive lung diseases Aerosolized Medication(s) Protocol indications:Treatment of bronchospasm/wheezing 2. Improvement of mucociliary clearance 3. Treatment of stridor 4. History of Bronchiectasis, Asthma or COPD 
C. Oxygen Therapy Protocol indications: 1. Documented hypoxemia 2. Severe trauma 3. Acute myocardial infarction 4. Short-term therapy (e.g. post anesthesia recovery) D. CVRU Ventilator Weaning Protocol indications: 1. All mechanically ventilated surgical patients unless they have a no wean order. E. Asthma Treatment Protocol ER indications: 1. Patients 15years of age and older that have been triaged or diagnosed with   asthma exacerbation shall be indicated for the ER Asthma Treatment Protocol. A physician order will be required to initiate the protocol. F. Pediatric Asthma protocol in the ER indications: 1. Patients less than 15years old that have been triaged or diagnosed with asthma exacerbation shall be indicated for the Pediatric Asthma protocol. A physician order will be required to initiate the protocol. G. Alpha-1 Antitrypsin Deficiency Testing protocol indications: 1. Patients admitted and diagnosed with COPD. H. Prone Positioning Protocol indications: 
       1. Acute lung injury 2. Acute respiratory distress syndrome (ARDS) I. Respiratory Care COPD Protocol indications: 1. History of COPD in patient's records 2. Smoking history J. Home Oxygen Assessment Protocol indications: 1. Chronic lung disease 2. Cor pulmonale 3. Unable to wean to room air 48 hours prior to anticipated discharge. K.  Ventilator Weaning Protocol indications: 1. Patient's mechanically ventilated 2. Managed by intensivist 
L. Lung Volume Expansion Protocol indications: 
      1. Any patient at risk for pulmonary complications. VI. Maintenance: H. Timely patient assessment is an integral part of this protocol therefore the following will be applied: 1. All non- critical care patients will be evaluated upon receiving initial respiratory care orders within 24 hours and re-evaluated within 48 hours (or more as needed). 2. Orders requesting a Respiratory Consult will be responded to in the following manner: 
a. In patient emergency situations, the RCP assigned to the floor will respond immediately to the patient, provide an initial respiratory assessment, and contact the patient's physician as necessary for appropriate orders. b. In non-emergent situations, the RCP assigned to the floor will respond to the patient within 90 minutes and provide an initial respiratory assessment and contact patient's physician as necessary for appropriate orders. c. An RCP will provide a comprehensive assessment as soon as possible. 3. Upon completion of an evaluation, the RCP will complete documentation in the patient's EMR in the RT Assessment section. 4. The RCP who completes the assessment will document orders for therapy in the orders section of the patient's EMR selecting new order. Next, per protocol should be selected indicating it is a protocol order and sign orders should be selected to complete the process. The applicable protocol must be added to the progress note per Joint Commission guidelines. 5. The Pharmacy and Therapeutics (P&T) Committee has mandated that the medication Xopenex may be changed to unit dose albuterol without an order, except for those patients receiving Xopenex due to cardiac arrhythmias. 1. The dosage for these patients should be 0.63 mg. and may be changed from 1.25 mg. to 0.63 mg per P & T Committee by the RCP completing the assessment. 6. Patients who are not experiencing cardiac arrhythmias, and are ordered Xopenex and Atrovent may be changed to Duoneb. VII. Safety:       
I. The following safety issues shall be monitored: 1. The RCP will perform hand hygiene per hospital policy utilizing Standard Precautions for all patients and following transmission-based isolation as indicated per hospital policy. 2. The RCP must exercise professional judgment in classifying the patient for frequency of therapy. 3. Appropriate classification of the patient will require an evaluation utilizing the Therapy Assessment Protocol Guidelines. 4. The RCP will confer with the physician concerning the care of the patient at any time questions or problems arise. 5. If during therapy, the patient exhibits no improvement, or deterioration in clinical status the RCP will notify the physician and the patient's nurse. VIII. Interventions:  
F. The patient's nurse is responsible concerning all items related to his/her care. Ongoing communication with nursing is essential to successful protocol management. G. The RCP recognizes the value of the team approach in meeting the patient's needs. Nursing input regarding the patient's pulmonary condition will be sought as needed. IX. Reportable conditions: The RCP will inform the physician if: 1. There are acute changes in patient's respiratory status. 2. The therapist is unable to determine appropriate care plan upon assessment. 3. The patient fails to reach therapeutic objective. 4. A change or additional medication is needed. X.  Patient Education:   
D. Patient will receive instruction on the followin. The treatment modality, including objectives and proper technique of therapy 2. Respiratory medications E. Documentation shall occur in the patient education section of the patient's EMR. XI. Documentation: Record all findings as described above in the patient's EMR. Related Protocols: A. Aerosolized Medication Protocol B. Bronchial Hygiene C. Oxygen Protocol D. CVRU Fast Track Weaning Protocol E. Asthma Treatment protocol ER 
F. Alpha-1 antitrypsin Deficiency Protocol G. Prone Positioning Protocol H. Respiratory Care COPD Protocol I. Home Oxygen assessment Protocol J. Ventilator Weaning Protocols K. Volume Expansion protocol Indications      Frequency          Level A. Aerosol therapy 1.  q4h     Severe SOB, wheezing, unable to sleep 1 2.  qid, q4 wa or q6h   Moderate SOB, wheezing   2 3.  tid      Hx of asthma, or COPD mild wheezing,  
      or facilitate secretion removal              3 
 4.  bid      Asthma, or COPD, Intermittent wheezing 4 5. PRN, i.e. tid PRN, qid PRN Asthma, or COPD, occasional wheezing 5 B. Bronchopulmonary Hygiene 1. q4h             Copious secretions, SOB, unable to sleep 1 2. qid & PRN            Moderate amounts of secretions   2  3. tid           Small amounts of secretions and poor cough,   
           history of secretions    3  
 4. PRN, i.e. tid PRN, qid PRN     Breathing exercises, encourage cough only 4  
  
C. Oxygen Therapy     Follow hospital approved Oxygen Protocol Note: 
qid treatments are due 0800, 1200, 1600, and 2000. tid treatments are due 0800, 1400, and 2000 Q6h treatments are due 0800, 1400, 2000, 0200 Q4 wa teatments are due 0800, 1200, 1600, and 2000. Q4h treatments are due  0800, 1200, 1600, 2000, 0000, and 0400. The Level 1-5 rating system is only to be used as criteria for determining appropriate frequency of therapy. References:  
[address] Standard L    Respiratory Care Department Policy, Procedure and Protocol Guideline Manual, 1995, KAITLIN Perez. L  Therapist Driven Respiratory Care Protocols  A Practitioner's Guide for Criteria-Based Respiratory Care by Gen Hendrickson M.D., and KAITLIN Hernandez RRT. L  The rationale for therapist-driven protocols: an update. Respiratory Care 1998; 66:077-044 L Therapist Driven Respiratory Care Protocols  A Practitioner's Guide for Criteria-Based Respiratory Care by Gen Hendrickson M.D., and KAITLIN Hernandez RRT. L The rationale for therapist-driven protocols: an update. Respiratory Care 1998; [id number]. N   [facility] Clinical Practice Guidelines. D. The RCP will perform a respiratory assessment in the following manner: 1. Perform hand hygiene per hospital policy utilizing Standard Precautions for all patients and following transmission-based isolation as indicated per policy. 2. Identify patient via ID bracelet verifying patient name and birth date. 3. General observations: color, pattern and effort of breathing, chest expansion, (symmetrical and bilateral), level of consciousness and the ability to ambulate. 4. The RCP will assess patients cough ability and determine if Nasotracheal suctioning is needed. If patient is unable to produce sputum, at that time, the RCP should question the patient with regard to their sputum: production, color consistency, frequency and amount. 5. Auscultation: Using a stethoscope, the RCP will listen and note quality of breath sounds and presence or absence of adventitious breath sounds in all lung fields, both anteriorly and posteriorly. 6. Upon completing the EMR review and physical assessment, the RCP will document findings in the RT Assessment section of the EMR. The score level will be provided and will be used to determine the frequency of therapy. V. Indications: A. Indications for Bronchial Hygiene Protocol will include: 1. Potential for or presence of atelectasis. 2. Need for hydration and removal of retained secretions. 3. Need for improvement of cough effectiveness. 4. Presence of conditions associated with disorder of pulmonary clearance: 
a. Cystic fibrosis b. Bronchiectasis B. Indications for Aerosolized Medication(s) Protocol should include: 1. Treatment of bronchospasm/wheezing 2. Improvement of mucociliary clearance 3. Treatment of stridor 4. History of Asthma or COPD 
           C.  Indications for Oxygen Therapy Protocol should include: 1. Documented hypoxemia 2. Severe trauma 3. Acute myocardial infarction 4. Short-term therapy (e.g. post anesthesia recovery) VI. Maintenance:   
D. Timely patient assessment is an integral part of this protocol therefore the following will be applied: 1. All non- critical care patients will be evaluated upon receiving initial respiratory care orders within 24 hours and re-evaluated within 48 hours (or more as needed). 2. Orders requesting a Respiratory Consult will be responded to in the following manner: a. In patient emergency situations, the RCP assigned to the floor will respond immediately to the patient, provide an initial respiratory assessment, and contact the patients physician as necessary for appropriate orders. b. In non-emergent situations, the RCP assigned to the floor will respond to the patient within 90 minutes and provide an initial respiratory assessment and contact patients physician as necessary for appropriate orders. c. An RCP will provide a comprehensive assessment as soon as possible. 3. Upon completion of an evaluation, the RCP will complete documentation in the patients EMR in the RT Assessment section. 4. The RCP who completes the assessment will document orders for therapy in the orders section of the patients EMR selecting new order. Next, per protocol should be selected indicating it is a protocol order and sign orders should be selected to complete the process. 5. The Pharmacy and Therapeutics (P&T) Committee has mandated that the medication Xopenex may be changed to unit dose albuterol without an order, except for those patients receiving Xopenex due to cardiac arrhythmias. The dosage for these patients should be 0.63 mg. and may be changed from 1.25 mg. to 0.63 mg per P & T Committee by the RCP completing the assessment. 6. Patients who are not experiencing cardiac arrhythmias, and are ordered Xopenex and Atrovent may be changed to Duoneb. VII. Safety: A. The following safety issues shall be monitored: 1. The RCP will perform hand hygiene per hospital policy utilizing Standard Precautions for all patients and following transmission-based isolation as indicated per hospital policy. 2. The RCP must exercise professional judgment in classifying the patient for frequency of therapy. 3. Appropriate classification of the patient will require an evaluation utilizing the Therapy Assessment Protocol Guidelines. 4. The RCP will confer with the physician concerning the care of the patient at any time questions or problems arise. 5. If during therapy, the patient exhibits no improvement or deterioration in clinical status the RCP will notify the physician and the patients nurse. VIII. Interventions: A. The patients nurse is responsible concerning all items related to his/her care. Ongoing communication with nursing is essential to successful protocol management. B. The RCP recognizes the value of the team approach in meeting the patients needs. Nursing input regarding the patients pulmonary condition will be sought as needed. IX. Reportable conditions: The RCP will inform the physician if: 1. There are acute changes in patients respiratory status. 2. The therapist is unable to determine appropriate care plan upon assessment. 3. The patient fails to reach therapeutic objective. 4. A change or additional medication is needed. X.  Patient Education: A. Patient will receive instruction on the followin. The treatment modality, including objectives and proper technique of therapy 2. Respiratory medications B. Documentation shall occur in the patient education section of the patients EMR. XI. Documentation: Record all findings as described above in the patients EMR. Related Protocols: A. Aerosolized Medication Protocol B. Bronchial Hygiene C. Oxygen Protocol D. Volume Expansion/Secretion Clearance E. Ventilator Weaning Protocols References: 
[address] Standard L    Respiratory Care Department Policy, Procedure and Protocol Guideline Manual, , KAITLIN ALCANTAR  Therapist Driven Respiratory Care Protocols  A Practitioners Guide for Criteria-Based Respiratory Care by Jacobo Doran M.D., and KAITLIN Hazel RRT. L  The rationale for therapist-driven protocols: an update. Respiratory Care 1998; 69:467-432 N   [facility] Clinical Practice Guidelines. Respiratory Care Services Policy Number: -[id number] Title: Aerosolized Medication Protocol Effective Date: 10/1998 Revised Date: 06/13, 03/16, 11/17, 07/19 Reviewed Date: 05/14/ 03/15 , 06/17, 5/18 I. Policy: The Aerosolized Medication Protocol shall by implemented by Respiratory Care Practitioners (RCP) for patients with orders to receive aerosol therapy with medication. II. Purpose: To open and maintain obstructed airways, the RCP, will utilize the following  
protocol to select the indicated aerosolized medication(s) and determine the most effective method of delivery to the patient. III. Patient Type: All patients who are determined to meet aerosolized medication criteria as  
       outlined in this protocol. IV. Responsibility: Director, [address], registered Respiratory Care Practitioners (RCP's) with documented competency in the performance of respiratory therapeutic techniques. V. Equipment needed: Eliana Begin I. Pulse oximeter J. AeroEclipse nebulizer K. Dry Powder Inhaler (DPI) VI. Protocol:  
G. The following conditions are accepted indications for aerosolized medication therapy. 5. Bronchospasm/wheezing 6. Impaired mucociliary clearance 7. Tracheobronchial mucosal congestion/and laryngeal stridor 8. Diseases which commonly require aerosolized medication therapy include, but are not limited to: 
f. Asthma/reactive airway disease 
g. Bronchitis/emphysema (COPD) h. Cystic fibrosis 
i. Severe laryngitis/tracheitis 
j. Bronchiectasis 
k. Smoke inhalation or chemical trauma to the lung or upper airway 
l. Physical trauma to the upper airway 
m. Laryngotracheobronchitis 
n. Bronchiolitis 
o. Non-specific wheezing B. Indications for bronchodilator medications will include: 
d. Bronchospasm/ wheezing 
e. Asthma/reactive airway disease 
f. Chronic obstructive pulmonary disease g. Obstructive defect on pulmonary function testing C. Administration of medications 5. If a bronchodilator or any other type of respiratory medication is needed, a physician order must be indicated in the medication section in the patient's EMR. 6. When the physician specifies the medication and dosage at the time of request, the ordered medication will be used as part of the care plan. D. The following guidelines will be utilized in the evaluation and selection of the appropriate delivery device for indicated medication(s): 
1. Unassisted aerosol (UA) is the preferred method of aerosol delivery and indicated if 
c. Ventilation is inadequate 
d. Patient demonstrates wheezing  
e. Routine treatments shall be given via the AeroEclipse nebulizer. f. The Aerogen nebulizer shall be used in the following circumstances: 
i. ER patients and they will continue with this nebulizer if admitted to 8th floor or ICU. 
ii. Patients in ICU  
iii. Patients on 8th floor with severe wheezing (at the RCP's discretion) 2. Dry PowderInhaler (DPI)  
d. Patient should be alert/cooperative 
e. Able to perform 3 second breath hold. f. Patient has used DPI therapy previously, either at home or in the hospital. 
g. Note: The only approved inhaler on formulary is Spiriva. VII. Guidelines:  
Monitor patient's vital signs and evaluate patient's clinical status. The need to change medication and/or modality may be indicated by: 5. A pulse greater than 120 bpm, or if a pulse increase of 20 bpm occurs with bronchodilator medications. 6. Significant worsening of dyspnea or wheezing occurring during or within 30 minutes of discontinuing therapy. 7. Worsening of patient's sensorium (e.g. patient becomes confused or obtunded, and unable to follow directions). 8. Worsening of patient's chest x-ray. 9. Change in sputum (e.g. increased pulmonary infiltrate, which might indicate need for volume expansion therapy). 10. Patient has difficulty coughing up secretions, which might indicate need for acetylcysteine and/or bronchial hygiene therapy. 11. Call physician immediately if dyspnea worsens and is not responsive to modifications allowed by protocol. VIII. Clinical Responsibility: 
2. The therapy assessment guidelines will be used to evaluate all patients receiving aerosolized medications with the exception of critical care areas. 5. RCP's will perform changes in therapy per protocol. 6. It will be the responsibility of RCP to provide instruction regarding respiratory medications, possible side effects, aerosol therapy and proper DPI technique, as well as, spacer usage to patients ordered DPI therapy. 7. Current therapy that is part of a patient's home regimen will not be discontinued. a. Provide spacer and educate patient on proper inhaler technique if needed. IX. Documentation D. Document assessment findings in the respiratory assessment section of the patient's EMR. E. Document changes in therapy per protocol in the respiratory orders section and in the care plan section of the patient's EMR. F. Document patient education in the patient education section of the patient's EMR. X. Outcome Criteria: 
I. Relief of wheezes and obstruction J. Improved cough and sputum color and consistency K. Improved chest x-ray L. Improved arterial oxygen tension and or SaO2 M. Improved Peak Flow on asthmatic patients XI. Related Protocols: J. Respiratory Patient Care Protocols K. Bronchial Hygiene Therapy L. Oxygen Protocol Reference: L - Respiratory Care Department Policy, Procedure and Protocol Guideline Manual, 1995, KAITLIN Perez. L -  Therapist Driven Respiratory Care Protocols  A Practitioner's Guide for Criteria-Based Respiratory Care by Tom Killian M.D., and KAITLIN Bella, BASIL. L - The rationale for therapist-driven protocols: an update. Respiratory Care 1998; [id number]. [facility] Clinical Practice Guidelines. Respiratory Care Services Policy Number: -[id number] Title: Bronchial Hygiene Protocol Effective Date: 01/1999 Revised Date: 12/2014, 11/2017, 7/2019 Reviewed Date: 06/2013, 05/2014, 03/2015, 03/2016, 06/2017, 4/2018 I. Purpose: The Respiratory Care Practitioner (RCP) will utilize the following protocol to select and initiate bronchial hygiene therapy to open and maintain obstructed airways when indicated. II. Patients: All patients who are ordered bronchial hygiene therapy. III. Clinical Area: All general patient floors IV. Protocol: The following conditions or diseases are indications for bronchial hygiene  
                        therapy. L. Oscillating PEP Therapy Indications should include:  
9. Atelectasis caused by mucus plugging or foreign body 10. Chronic mucociliary clearance disorders 11. Retained secretions which may be associated with the following conditions: 
p. Bronchitis 
q. Bronchiectasis 
r. Pneumonia M. PAP- Positive airway pressure therapy Indications include: 7. Patients with post-operative atelectasis or to prevent post operative atelectasis. 8. Patients who cannot perform deep breathing exercises due to pain. 9. Patients requiring lung expansion therapy who cannot follow instructions. 10. Patients requiring lung expansion therapy with poor inspiratory capacity <10cc/kg. 11. Patients requiring aerosol therapy in conjunction with opening their airways. N. Vibratory / Acoustical Airway Clearance Therapy (ACT)- i.e. (Vibralung, Vest, or Percussor) Indications should include 12. Patient conditions  that involve retained secretions, increased mucus production and defective mucociliary clearance such as: 
h. Cystic fibrosis 
i. Chronic bronchitis 
j. Bronchiectasis 
k. Pneumonia 
l. Asthma 
m. Muscular dystrophy 
n. Post-operative atelectasis o. Neuromuscular respiratory impairments 
p. ACT may be considered in patients with COPD with 
symptomatic secretion retention, guided by patient preference, 
toleration, and effectiveness of therapy Tash Hollis et al., 2013). O. Nasotracheal suctioning indications should include: 
8. Inability to cough effectively 9. Excessive secretions 10. Artificial airway V. Equipment: A. PEP therapy device B. Vest therapy equipment Jed Silence D. Verner Nazario Janusz Theodore F. NT suction equipment VI. Guidelines:   Monitor patient's vital signs and evaluate patient's clinical status. The need to  
                             change therapy modality may be indicated by: 
[place] in patient's sensorium (patient now confused or obtunded, and unable to follow directions). I. A significant deterioration is evident on patient's chest radiograph or increased sputum production. J. Increased thickening of secretions (e.g. mucolytic therapy may be indicated.) K. Development of wheezing L. Decrease in oxygen saturation M. Development of chest pain. VII. Clinical Responsibility: The Therapy Assessment Protocol guidelines will be used to  
             re-evaluate all patients on bronchial hygiene therapy (See Therapy Assessment Protocol). N. RCP's will perform changes in therapy according to protocol. Joellen Jimenez O. Bronchial hygiene therapy may be discontinued when goals of therapy are met, e.g., secretions easily expectorated for 48 hours, atelectasis is resolved, etc. 
P. PAP Therapy may be utilized in place of IPPB therapy per discretion of the RCP, as approved by the Pulmonary Medicine and [facility] N [facility] Riddle. VIII. Outcome Criteria:  Outcome criteria for bronchial hygiene therapy should include: M. Decrease in sputum production N. Improved breath sounds O. Improved arterial oxygen tension and/or SaO2 P. Improved chest X-ray Q. Subjective response to therapy IX. Documentation G. Document assessment findings in the respiratory assessment section of the patient's EMR. 
H. Document changes in therapy per protocol in the respiratory orders section and in the care plan section of the patient's EMR. 
I. Document patient education in the patient education section of the patient's EMR. 
 
 
X. Related Protocols: 
3. Respiratory Patient Care Assessment Protocols 
2. Aerosolized Medication Protocol 
2. Oxygen Therapy Protocol 
 
 
 
Reference: 
 
L - Respiratory Care Department Policy, Procedure and Protocol Guideline Manual, 1995, KAITLIN Perez. 
L - Therapist Driven Respiratory Care Protocols – A Practitioner's Guide for Criteria-Based  
      Respiratory Care by Andrew Noyola M.D., and KAITLIN Perez, RN, RRT. 
N – [facility] Clinical Practice Guidelines. 
CYNTHIA Ruiz., ANTONETTE Méndez., SAMARA Godoy., LANCE Lyon., OLANCE Bae., CHANDLER bryant., . . . MARIANA Lau. (2013, December). [facility] Clinical Practice Guideline: Effectiveness of Nonpharmacologic Airway Clearance Therapies in Hospitalized Patients. Respiratory Care, 58(12), 8980-5731. Retrieved June 28, 2019 
 
 
 
 
 
 
 
 
 
 
Respiratory Care Services 
  
Policy Number: -[id number] 
 
Title: Noninvasive Positive Pressure                         Ventilation, (BIPAP VISION) and 
          Respironics (V60) 
 
Effective Date: 06/2005, 06/2017 
 
Revised Date: 9/2012, 07/2014, 07/2015, 1/2016  
 I. Description: Non Invasive ventilation (NIV) is a ventilatory assist technique used as an alternative to endotracheal intubation. NIV is pressure ventilation delivered as BIPAP (Bi-level Positive Airway Pressure) which is a low pressure electronically driven device intended for use as a ventilatory support system for patients who have an intact respiratory drive. The device provides non-invasive ventilatory assistance through the use of a nasal or full face mask. BiPAP  (two levels of ventilatory support) known as inspired positive airway pressure (IPAP) and  positive airway support (EPAP). One level of support can also be delivered which is delivered as EPAP or CPAP which is delivered throughout the respiratory cycle. The aim is to maintain adequate ventilation and minimize the effort of breathing. The BIPAP Vision System and the Respironics V60 are the two systems available for non-invasive ventilation. These devices are also capable of being used for invasive ventilation in the critical care units only. BIPAP Vision: This device uses an electronic pressure control sensing monitor pressure differential in the patient circuit. This feedback allows for adjustment of the flow and pressure output 
to assist in inhalation or exhalation through the administration at two distinct levels of positive pressure. During inspiration, the level is variably positive and is always higher than the expiratory level. During exhalation, pressure is variably positive or near ambient. In addition, this device has the ability to compensate for leaks through automatic               adjustment of the trigger threshold. This capability allows for the application of BIPAP    for mask-applied ventilation assistance.  
 
Respironics V60 
 The Respironics V60 uses Auto-Trak technology to help ensure patient synchrony and therapy acceptance and is designed to address the specific challenges of NIV. By providing auto-adaptive leak compensation, inspiratory triggering, and expiratory cycling, Auto-Trak delivers optimal synchrony in the face of dynamic leak and changing patient demand. The Respironics V60 is designed to include pediatric use and is equipped with several modes, which allows the practitioner to meet the specific needs of the patients. See Appendix 1 for definitions of settings. II. Policy: The administration of non-invasive positive pressure ventilation (NPPV) will be the responsibility of the Respiratory Care Practitioner (RCP). Upon receipt of a physician order, proper patient instruction, set up and monitoring will be provided to ensure patient understanding, compliance and proper utilization of prescribed therapy. A. A patient may be allowed to use their own NPPV machine after having their equipment checked and approved by [employer]. B. A consent and Release for Home Ventilator form must be signed by the patient and witnessed by a health care provider if the patient chooses to use his home ventilator. C. A patient that is being treated for acute respiratory failure and placed on non-invasive ventilation must be placed in a critical care unit, per Medical Director. D.  A patient who is being treated for sleep apnea may be treated on the floors. E.   A patient has the option of using a hospital owned NPPV unit. F.   A patient may use a hospital unit and their own mask if they choose. G. Patients may be placed on full face mask with NPPV units on the hospital floors            under the following conditions: 1. Patient has an end stage disease process. 2. Patient was placed on full face mask and NPPV unit in the Critical Care Units and it has been established as safe and effective therapy. 3. Patient is ordered full face mask with NPPV unit for home use. 4. In the event patient is to be set up or transitioned to home on a NPPV unit, the Respironics V60 (in the AVAPS mode) shall be utilized while the patient is in the hospital. If a Radha Broom is unavailable, a home NPPV unit may be provided by the DME provider for no more than 48 hours. III. Responsibility: Director, [employer], and all Respiratory Care          Practitioners with documented competency. IV. Indications for non-invasive ventilation: A. Acute respiratory failure (Patient must be in Critical Care Unit) B. Chronic respiratory failure C. Alveolar hypoventilation D. Documented sleep apnea V. Contraindications: A. Patients with severe respiratory failure without a spontaneous respiratory drive B. Noninvasive ventilation may be contraindicated for patients with the followin. Inability to maintain a patent airway or adequately clear secretions 2. Acute sinusitis or otitis media 3. Risk for aspiration of gastric contents 4. Hypotension 5. Pre-existing pneumothorax or pneumomediastinum 6. Epistaxis 7. Recent facial, oral or skull surgery or trauma 8. history of allergy or sensitivity to mask materials where the risk from allergic reaction outweighs the benefit of ventilatory assistance C. Potential Complications: 
1. Cardiovascular compromise 2. Skin breakdown and discomfort from mask 3. Gastric distention 4. Increased intracranial pressure 5. Pulmonary barotraumas VI. Mask fit A. It is essential that the patient be correctly fitted with an appropriate size mask. 1. Choose mask according to sizing template on disposable setups. 2.  The mask should fit comfortably on the patients nose,  not occluding the            nares and the base of the mask should fit comfortably between the chin and        bottom lip see picture on setup guide. 3.  Headgear should fit comfortably not tight. The bottom edge of the headgear        should sit at the base of the nape of the neck with side straps underneath the        earlobes. 4. The face masks are better for patients who are dyspneic and tachypneic as            they tend to mouth breathe with a nasal mask and reduce the effectiveness          of the ventilation. 5. Masks that are too tight are uncomfortable and cause pressure areas and              masks that are too loose leak which reduce the efficiency of the system. 6. When using a nasal mask the patient must keep their mouth closed to obtain       the desired effect of the ventilation. 7. Place an Allevyn Gentle Border dressing over bridge of nose to avoid skin          breakdown. VII. Equipment for BIPAP Vision A. BIPAP Vision Ventilatory Support Unit B. BIPAP Vision disposable circuit with proximal pressure and exhalation port. C. Main flow bacterial filter D. Nasal or face mask and disposable head strap F. Smooth inner lumen tubing for connecting the humidifier system to the BIPAP unit. G. Pulse oximetry equipment and supplies H. Oxygen analyzer with circuit adapter I. Device specific humidification system which must be used when using BIPAP. VIII. Procedure for Non-invasive ventilation via BIPAP Vision: A. BIPAP Vision Set Up 1. Assemble the circuit with exhalation port proximal to the patient. 2.  A bacterial filter and oxygen analyzer should be place between the                             machines patient interface port and the patient circuit. If using the oxygen               module, connect to a 50 psi oxygen source. 3. Attach humidifier to the system. 4. Plug electrical cord in AC outlet. Press START on the back of the machine. 5. The Vision will perform a self-test as indicated by the display screen,                        System Self-Test in Progress.  6. Perform the Test Exhalation Port second button from top, left of screen. a) Insure that whistle port and pressure tubing are in line. b) Occlude end of whistle port at end of tubing throughout the test. 
c) Press START TEST, top button right screen; this tests the leak of the            circuit. 7. Assess appropriateness of physicians orders and set ventilatory parameters accordingly. Initial settings as well as changes to ventilatory parameters must be accompanied by a physician order. 8. Select the proper mode by first selecting the mode button at the bottom of screen. a) Choose CPAP or ST mode, top button, right side of screen per  order. b) Activate view mode by pressing the [facility] button, bottom right of screen. 9. Select the parameters button below the screen. a) Choose a parameter from the left and right sides of screen. Press the soft button for the parameter of choice. Once it is highlighted, spin knob clockwise to increase value, and counter clockwise to decrease value in the parameter block. Repress the button for that particular parameter to activate the new value. b) Consult the BIPAP Vision Ventilatory Support System Clinical Manual for specific information on the modes of operation and set parameters. 10.   Select alarms button, below screen. Set values for Hi Pressure, Lo Pressure, Lo      Pressure Delay, Apnea, Lo Min Vent, Hi Rate, Lo Rate as appropriate for patient. B. Post Procedure -Cleaning and Sterilization for the BIPAP Vision 1. Before cleaning the unit, turn the Start/Stop switch to the Stop position and unplug the electrical cord from the wall and from the rear of the unit. 2. Clean the front panel with water or 70% Isopropyl Alcohol only. Do not immerse the Vision unit in water. 3. Clean the exterior of the enclosure with a hospital approved disinfectant solution. Do not allow any liquid to enter the inside of the ventilator. 4. Refer to the BIPAP Vision Ventilatory Support System Clinical Manual, Chapter 15 Cleaning and Routine Maintenance and Replacing the [facility]. IX. Procedure for non-invasive ventilation using the V60: 
A. Set up machine circuit using disposable mask and circuit setups only. 1.  Ensure that there is always an exhalation port at the base of the mask for C02 to be . 2. Set mode and settings as per physician orders. 3.  See Appendix 1 for definitions of all modes, usual and alarm settings. 4. Set Alarms on machine. See usual alarm settings in appendix. 5.  Turn on machine and gently hold mask over nose/face until patient becomes accustomed to the airflow. 6. Attach the head strap. 7. Stay with patient until the 02 saturations and observations are stable. B. Monitor patients response for: 1. Decreased Heart rate, respiratory rate, and blood pressure. 2. Decreased sweating 3. Decreased work of breathing (as per baseline observations) 4. Patient feels more comfortable 5. Patient finds it easier to breathe X. Monitoring: A. While in use, the RCP should check the patient and non-invasive unit no less than every four hours. B. Failure of NIV should be suspected if: 1. The patient is unable to maintain adequate oxygenation, decreasing 02 saturations despite increases in 02. 
2. There is a reduction in neurological or conscious state. 3. The patient has excessive secretions or increasing respiratory rate. 4. Failure of PaCO2 or PH to improve on ABG sample. 5. The patient has poorly compliant lungs. XI. Weaning A. Weaning or cessation of non-invasive ventilation should occur under the following                        conditions: 1. PaC02 returns to normal and patient maintains O2 saturations with minimal                oxygen. 2. Respiratory rate is returned within normal limits. 3. Patient is unable to tolerate non-invasive ventilation. 4. Patients dyspnea is reduced. 5. Patient exhibits normal overnight ventilation. 6. Patient is receiving palliative treatment. XII. Documentation: A. Documentation should include the followin. Ventilator settings comply with physician order. 2. Ventilator is functioning properly as evidenced by a check of measured volumes, rates, pressures and FIO2. 3. Alarms are set appropriately. 4. Measured inspired gas temperature (invasive mode only) 5. Vital signs (pulse, respiratory rate, oxygen saturation, breath sounds) 6. Patient tolerance to therapy. 7. Manual resuscitator and appropriate size mask at patient bedside REFERENCES 
BIPAP Vision Ventilatory Support System Clinical Manual. 
 
[address] Therapy and Respiratory Care Section. JAQUELIN Daley 2001. Introducing non-invasive positive pressure ventilation. Nursing Standard. 15,26, 42-45. [facility]. 2003. Using non-invasive ventilation in acute wards: part 2. Nursing Standard. 18,1, 41-44. [place] 47. Use of continuous positive airway pressure (CPAP) in the critically ill-physiological principles. Critical Care 12 (4 154-58 CYNTHIA Oglesby2002. Bi-level positive airway pressure (BiPAP) and acute cardiogenicpulmonary edema ( ACPO) in the emergency department. Critical Care 15 (2):51-63 RONNELL Zamarripa2002. Non-invasive BiPap-implementation of a new service. Intensive and Critical Care Nursing [id number] CYNTHIA Kam,et al 2002. Non-invasive ventilation in acute respiratory failure. [place] 47:[phone] DEFINITIONS AND USUAL SETTINGS                                                            APPENDIX 1 Settings Settings in Active CPAP Settings in Active BiPAP Description Range Usual Setting Used in NIV 
 
  
 
CPAP Mode   Continuous Positive Airway Pressure 4-24 cmH20 8-14 ST or  BiPAP MODE 
 
 
  Spontaneous Timed or BiLevel Positive Airway Pressure Ventilation. Two level system of alternating during non- invasive ventilation in sync with breathing-set IPAP and EPAP   
 
__  
 
__ AVAPS Mode (only available  on V60)          The volume-targeted AVAPS (average volume-assured pressure support) mode combines the attributes of pressure-controlled and volume-targeted ventilation. __  
 
__  
 
 
EPAP 
 
         
 
            
 
        Positive Airway Pressure. Recruits under ventilated alveoli to remain open during expiration by providing a constant pressure throughout resp. cycle. Must be less than or equal to IPAP  
 4-25 cmH20 5-14 Settings Settings in Active CPAP Settings in Active BiPAP Description Range Usual Setting Used in NIV  
 
IPAP  Inspired Positive Airway Pressure provides pressure throughout the inspiratory phase to support patient ventilation  4-40 cmH20 10-20 I-time  Inspiratory time- time taken to inspire in seconds  0.3  3.0 sec 1:3  
FIO2          Oxygen delivered  21- 100% As ordered Ramp time       
 
 
       The Ramp Time function helps your patient adapt to ventilation by gradually increasing inspiratory and expiratory pressure over a set interval (minutes). This time gradually delivers pressures so to reduce patient anxiety and increases comfort. Off 
or 
5-45 minutes 10 minutes Rate (RR)          Patients respiratory rate 4- 60 BPM 4 Rise time          Speed at which the inspiratory pressure rises to the set pressure. 1-5 
(1 is the fastest) Set at 3 Patient Data Data Description Range Usual setting in NIV Breath phase/trigger Indicator  Bar in left hand corner. Colored according to breath trigger.   n/a n/a  
PIP Positive Inspiratory pressure  0-50 n/a  
 Patient total leak Est. or unintentional leak  0-200 n/a Patient trigger Patient triggered breaths as a percentage  0-100% Should be 100% Respiratory rate Respiratory rate 0-90 n/a Ti/Ttot Inspiratory duty cycle or inspiratory time divided by total cycle time  0-91% n/a Minute volume Est. minute ventilation. TV x rate=MV  0-99 LPM n/a Tidal volume Est.  tidal volume  0-3000 ml n/a Alarms Alarm Description Range Set  at Hi Rate High respiratory rate 5-90 10 breaths above patients breath rate Low Rate Low respiratory rate alarm 1-89 BPM 5 breaths below patients breath rate Hi VT High tidal volume alarm 200-2500 ml 200 ml above patients own Low VT Low tidal volume alarm OFF  1500 ml OFF  
HIP High Inspiratory pressure alarm 5-50 cm H20 10 cm above IPAP  
LIP Low inspiratory pressure alarm OFF, 1-40 cmH20 OFF Lo VE Low minute vent alarm OFF, 0.1 to 99L/min OFF  
LIP T Low inspiratory delay time 5-60 seconds 5 seconds CPAP Settings BiPAP Settings Set CPAP level as ordered Set breath rate as ordered Set FIO2 as ordered Set I-time as 1.3 Set Ramp time as ordered Set EPAP as ordered Set C-Flex at 3 Set IPAP as ordered Set Rise time as ordered Set FIO2 as ordered Respiratory Care Services Consent and Release for Home Ventilator Patient Name: _________________________________________ Room: ___________ 
 
SSN: _______________________________           Account Number:  __________________ Use and care of my personal home ventilator, (invasive or non-invasive) mechanical life support device while at [facility] system has been discussed with me by my physician and I have been provided with an opportunity to ask questions which have been answered to my satisfaction. I also understand a respiratory care practitioner is not expected to remain in my room or general vicinity at all times. It is understood that every precaution consistent with the best medical practice will be taken and I give Respiratory Care Services permission to monitor my ventilator during my hospital stay. I hereby release [facility] system, its agents, employees and medical staff from liability arising from any and all claims, demands, losses and damages to person and/or property as a result of the above mentioned requests. I certify that I have read and understand this consent agreement and release and that I am legally qualified to rayo this authorization. ___________________ Date 
 
_____________________________________        ________________________ Signature of Patient or Parent (of minor patient,                  Relationship (if other than Patient) penitentiary parent) if applicable. Closest Relative, 
Guardian or legal Representative 
 
_____________________________________ Witness Legal representative is defined as person named by the court as a guardian, executor or , or having power of  specifically set forth to authorize this action. [facility] [phone] (3/02, 7/14)   Consent and Release for Home Ventilator Guideline Guideline Number: -[id number] Title: Management of the Patient with Mechanical Ventilation (including weaning) and ABCDE Bundle Effective Date:  03/00 Revised Date: 02/09, 03/10, 7/12, 5/13,                                  10/13, 8/14 Reviewed Date: 07/2015, 04/2016, 06/2017 I.  Policy:  Management of the patient requiring mechanical ventilation, including readiness to wean and weaning protocol. The information provided serves as a guideline for patient management. Included in this guidelines is the ABCDE Bundle to provide guidance to staff for evidence based management of pain, agitation/anxiety and delirium in the intensive care unit. The goals of critical care analgesia and sedation are to facilitate mechanical ventilation, to prevent patient and caregiver injury, and to avoid the psychological and physiologic consequences of inadequate treatment of pain, anxiety, agitation, and delirium by maintaining a light level of sedation. Pain occurs commonly in adult ICU patients, regardless of admitting diagnosis. Therefore, pain should be frequently assessed and analgesic medications titrated to prevent adverse effects associated with either inadequate or excessive analgesia. Once pain has been addressed, anxiolytic and/or antipsychotic medications can be utilized to treat unresolved agitation/anxiety and delirium, with the goal to prevent over- or under sedation by using the Sterling Agitation Sedation Scale (RASS). Assertive management of these issues has been shown to reduce costs, improve ICU outcomes such as successful extubation and ICU length of stay, and allow for patients to participate in their own care. II. Purpose: The respiratory care practitioner and the critical care RN will utilize the following guideline to provide the most efficient and effective management of mechanical ventilators and weaning and extubation processes. Goals of Treatment: 1. Adequate management of patients pain and discomfort while maintaining a light level of                   sedation (RASS score of 0 to -1) 2. Both chronic and acute sources of pain should be identified and treated. 3. Sedative agents should be considered if patient still expresses discomfort and/or is not at RASS         goal of 0 to -1 despite adequate management of pain. 4. Patients requiring neuromuscular blockage must have continuous infusions of analgesic and              sedative agents. III. Responsibility: Director Respiratory Care Services and all Respiratory Care Practitioners  with documented competency as well as Critical Care RN staff. General Guidelines 1. Introduction to Ventilator Plan Phase 
a. Ventilator Management Phase, General Statement -  The plan should be initiated in patients who have a secure airway/require invasive mechanical ventilation (endotracheal or tracheostomy) only -   The provider determines the appropriate medications used for analgesia and agitation/anxiety along with the clinical pharmacist 
 
2. Monitoring Levels of Comfort 
a. Pain Assessment 
- Pain is monitored using the numerical scales - A pain assessment should be conducted, at a minimum, every 4 hours and as needed and per guidelines. - The level of pain should be determined as satisfactory by the patient based on patients baseline level of pain , considering any chronic pain that the patient may have. - If the patient is unable to communicate pain level, the nurse can assess for nonverbal indicators including facial grimacing, moaning, tachypnea, tachycardia, hypertension, diaphoresis, etc as a cue to begin further pain assessment. b.  Sedation Assessment - Sedation is monitored using the Sterling Agitation Sedation Scale (RASS) Target RASS RASS Description +4 Combative, violent, danger to staff 
  
+3 Pulls or removes tubes(s) or catheters; aggressive +2 Frequent nonpurposeful movement, fights ventilator +1 Anxious, apprehensive, but not aggressive  
0 Alert and calm  
-1 Awakens to voice (eye opening/contact) > 10 sec -2 Light sedation, briefly awakens to voice (eye opening/contact) < 10 sec  
-3 Moderate sedation, movement or eye opening. No eye contact  
-4 Deep sedation, no response to voice, but movement or eye opening to physical stimulation  
-5 Unarousable, no response to voice or physical stimulation  
 
-  Goal RASS is 0 to -1, unless otherwise specified by providers order. 
- Nursing staff should conduct the RASS every 4 hours and as needed to maintain goal RASS of 0 to -1. 
- If RASS is outside of goal range, discuss treatment options with provider. 
- A RASS score of +2 to +4 requires further assessment by the nurse. Causes of agitation/anxiety that should be considered include: 
a. Pulmonary -   endotracheal tube malposition or patency, mode of ventilation, pneumothorax, hypoxemia, hypercarbia 
b. Metabolic  hypoglycemia, hyponatremia, acute renal or hepatic failure 
c. Emotional upset  with information and awareness of critical condition, prognosis, need for surgical or invasive procedures, other interventions or complications, family or personal stressors 
- C. Sedation Assessment while using Neuromusclar Blocking Agents 
- D. Delirium Assessment 
a. the ICU (CAM  ICU) 3. Analgesia The incidence of significant pain has been reported to be 50% or higher in both medical and surgical ICU patients. These patients also experience discomfort during routine/procedural ICU care and at rest.  However, patients may be unable to self-report their pain (either verbally or with other signs) because of an altered level of consciousness, the use of mechanical ventilation, or high doses of sedative agents or neuromuscular blocking agents. The short and long term consequences of unrelieved or inadequately treated pain are significant and include patient discomfort, decreased satisfaction with care by family and patient, delirium, agitation/anxiety, post traumatic stress disorder and depression. Therefore, routine assessment and treatment of pain should occur in all ICU patients. Causes and Treatment of Pain in the ICU 
a. Acute pain (post-operative, procedural pain, discomfort with usual ICU care or other acute episodes of pain-related to underlying disease) 1. Consider use of PCA for alert and oriented patients with pain needs not met by PRN dosing or opoids. 2. Preemptive analgesia should occur prior to chest tube removal, and should be considered for other procedural pain such as turning and repositioning, would drain removal, wound dressing change, tracheal suctioning, femoral catheter removal or place of central venous catheter. 3. Appropriate analgesic medications for preemptive analgesia are short acting intravenous (IV) agents (i.e. fentanyl, morphine, hydromorphone) a. Administration of analgesia before patient experiences noxious stimuli prevents amplification and hyperexcitability of the central nervous system. b. Analgesia for Mechanically Ventilated Patients: 
1. The approach to sedation and analgesia management for mechanically ventilated patients favors use of analgesia first sedation. The primary goal of this strategy is to address pain and discomfort first, and then if necessary, add anxiolytic agent. 2. Analgesia first sedation reduces dose escalation of medications, decreases the duration of mechanical ventilation and the incidence of VAP, improves the probability of successful extubation, and ultimately shortens ICU length of stay. 3. For pain management, analgesic medications are determined by the provider.    Intermittent dosing of the analgesic should be attempted first.   
 If the patient requires more than 3 doses within 1 hour then provider should be contacted to consider continuous infusion. 4.  Analgesic options for mechanically ventilated patients include: 
a. Fentanyl which is considered the drug of choice for patients requiring continuous infusion. b.Morphine may be considered for those patients without renal dysfunction who are hemodynamically stable and require intermittent pain medication. Continuous infusions of morphine may be used for patientl who are receiving comfort care as part of end of life care. c.Hydromorphone is reserved for patients who are refractory to fentanyl or morphine and is typically admininstered by intermittent dosing. 4.  Agitation/Anxiety Background Agitation and anxiety frequently occur in ICU patients. Anxiolytic/sedation agents may be indicated to help relieve discomfort, improve synchrony with mechanical ventilation, and decrease the overall work of breathing. Pain control alone may be sufficient to make patients comfortable enough to require no anxiolytic/sedative agent. In addition, non-pharmacologic interventions such as repositioning or verbal assurance may be helpful to comfort or redirect an agitated patient. If these methods are unsuccessful, then anxiolytic/sedative medications such as propofol, dexmedetomidine, or benzodiazepines can be used. Selection of an anxiolytic should be based on the pharmacokinetic properties of the medication, patient specific characteristics, and sedation goal.   However, nonbenzodiazepine sedatives (ie propofol or dexmedetomidine) may be preferable over benzodiazepines (ie midazolam or larazepam) due to more favorable outcomes such as delirum. Causes and Treatment of Agitation/Anxiety 
a. Possible underlying causes of agitation and anxiety include pain, delirium, hypoxemia, hypoglycemia, hypotension, or withdrawal from alcohol  and other drugs. b. Analgesia first sedation should be attempted initially to manage pain and provide sedation in appropriate patients. Analgesia alone may be adequate to reach RASS goal of 0 to -1. If patient remains agitated or anxious despite adequate analgesia (ie RASS +2 to +4) then anxiolytic/sedative should be considered. c. The choice of anxiolytic should be based on desired levels of sedation (ie light sedation or deep sedation) with preference for the use of nonbenzodiazepines such as propofol or dexmedetomidine if appropriate. While light sedation (ie RASS 0 to -1) is preferred for most patients, there are instances when deep sedation (ie RASS -4 to -5) is desired. For example, in the setting of ventilator dysynchrony due to ARDS or for patients receiving NMB agents. d. Medications to maintain light sedation (ie RASS 0 to -1) include 1. Propofol continuous infusion can be considered for hemodynamically stable (ie SBP = 100, MAP = 65 and/or not requiring vasopressor support) patients requiring light sedation. Propofol has a quick onset (1-2 minutes) and offset of action, making it a good agent to assess neurological status and facilitate liberation from the mechanical ventilator. 2.Dexmedetomidine continuous infusion is a good option for hemodynamically stable patients requiring light sedation as it allows for a more awake, interactive patient is associated with less delirium. It has an intermediate onset of action (5-10 min). Therefore, abrupt titrations should be avoided, but use of prn haloperidol or benzodiazepine may be useful to manage agitation until the medication takes effect. 3. Antipsychotics are another option. In particular, haloperidol intermittently dosed may be useful for patients with symptoms of agitation/anxiety and delirium. 4.Benzodiazapines can also be considered for light sedation, but should be intermittently doses. Midazolam is an option for patients without renal dysfunction. It has a short onset of action (2-5 minutes) making it a good agent for acute agitation/anxiety, but short duration of action resulting in frequent dosing. Lorazepam is another option. It has a longer onset of action (15-20 minutes) in comparison to midazolam, but longer duration of action. e. Medications to maintain deep sedation (RASS -4 to -5) include: 
1) Propofol continuous infusion should be considered as a first line option for hemodynamically stable patients. 2)Benzodiazepines can be considered as second line options for deep sedation. Studies comparing these agents to other sedatives have shown that they lead to worse outcomes including delirium, oversedation, delayed extubation, and longer time to discharge. Midazolam is one option for patients without renal dysfunction and lorazepam is another options. If patient requires more than 3 doses within 1 hour then contact provider  to consider initiation of continuous infusion. 5.  Daily Sedation Awakening Trial (SAT) from IV Continuous Analgesia/Sedation 
a. Patients are to have daily awakening from sedation while on continuous IV analgesia and/or sedation in the ICU. Continuous analgesia infusions may be maintained only if needed for active pain and RASS is at goal 0 - -1. Unit guideline is for the SAT to occur following ICP rounds each morning.   
b. The sedation awakening trial (SAT) is done regardless if the patient meets criteria for spontaneous breathing trial (SBT). c. SAT safety screen is assessed and SAT should not be performed if sedation is being used for active seizures, alcohol withdrawal, hemodynamically unstable or requiring support of vasoactive medications , in conjunction with NMB agents, if ICP is greater than 20mmHg or if sedation is being used to control ICP, patients RASS is +3 or +4 (very agitated or combative). Other exclusion criteria are:  if there is documentation of myocardial ischemia in the past 24 hours; or patient is receiving high frequency oscillator ventilation (HFOV) , if the patient has an open chest /abdomen or is receiving comfort care. d. Criteria for passing the SAT are the patient opened their eyes to verbal stimuli or tolerated sedative interruption without exhibiting failure criteria. 
e. Patients fail the SAT if the develop sustained anxiety, agitation, or pain; a respiratory rate of 35 per minutes for 5 minutes or longer, an SpO2 less than 88% for 5 minutes or longer; an acute cardiac dysrhythmia; two or more signs of respiratory distress including tachycardia, bradycardia; use of accessory muscles; diaphoresis; marked dyspnea; or myocardial ischemia. f. Respiratory therapy staff must verify with the nurse that continuous IV analgesia (unless being use  for active pain) and sedation (unless patient is receiving dexmedetomidine) is off prior to placing patient on SBT. g. DO NOT interrupt infusion of analgesia and sedation medications if patient is receiving neuromuscular blockade. 
h. Monitor level of wakefulness unless patient is awake and follows commands (RASS 0 to -1) or patient becomes uncomfortable or agitated (RASS +3 to +4) 
i. If agitation prevents successful awakening , administer bolus of analgesia and/or sedation then resume infusion of the medication at ½ previous dose and titrate as needed. 
j. If oversedation prevents successful awakening, hole infusion until at goal and resume ½ of prior infusion rate/dose if clinically indicated. 6. Delirium Background Delirium is characterized by the acute onset of cerebral dysfunction with a change or fluctuation baseline 
mental status, inattention, and either disorganized thinking or an altered level of consciousness. It affects up to 80% of mechanically ventilated adult ICU patients, and is associated with increased mortality,  
and treatment is important and may in turn allow for a patient to be conscious yet cooperative enough to participate  
in ventilator weaning trials and early mobilization efforts. Delirium can only be assessed in patients who are able to sufficiently interact and communicate with bedside 
clinicians (ie RASS -3 to +4). IV. Procedure: A. Assessment: The following criteria must be assessed prior to the initiation of weaning from mechanical ventilation. Note: The criteria are general guidelines and must be individualized for each patient. The patients primary nurse will be responsible, in coordination with the RT, the Spontaneous Awakening Trial). The RT will perform the SBT. B. Spontaneous Awakening Trials (SATs  also referred to as Sedation Vacation) and Spontaneous Breathing Trials (SBTs) performed to determine readiness to wean. 1. For patients who meet established criteria, such as those without active seizures, alcohol withdrawal and agitation, myocardial ischemia or those requiring cardiac support devices, without increased intracranial pressure and those not receiving neuromuscular blockade, the nurse will reduce the infusions of sedative by 50% of current used for sedation that was used to achieve a level of light sedation (Carbajal Score 2 or RASS score of 0 to -1) and evaluate patient response to reduction of sedation. Analgesics required for pain control are continued during the test.  Obtain MD order to cover no SAT for that time period if patient has any exclusion criteria as described above. 2. Failure of the spontaneous awakening trial occurs when the patient shows symptoms such as increased agitation, anxiety, pain or signs of respiratory distress including respiratory rate >35/min or oxygen saturation <88% as well as development of acute cardiac arrhythmias. If these symptoms develop during the SAT, the nurse then restarts sedation at 75% of the previous dose and titrates the medications until the patient is comfortable and/or symptoms have abated. 3.  If the patient passes the SAT then the patient moves on to the Spontaneous Breathing Trials as performed by the RT. The SBT Safety Screen included the following:  No agitation, oxygen saturation > 88%, FIO2 < 50%, PEEP < 7.5 cm H20, no myocardial ischemia, no vasopressor use, and with inspiratory efforts. 4. Patients who pass the spontaneous awakening trial but fail the spontaneous breathing trial are placed back on full ventilator support and reassessed the next day. 5. Failure of the SBT (spontaneous breathing trail) includes any of the following:  Respiratory rate > 35/min, respiratory rate < 8/min, oxygen saturation < 88%, respiratory distress, mental status change, acute cardiac arrhythmia. 6. Extubation is considered for patients who tolerate the spontaneous awakening trial and pass the spontaneous breathing trial.   
C. Can the cause of respiratory failure be reversed (i.e. absence of high spinal cord injury or advanced ALS)? D. Is gas exchange adequate? 1. PaO2/FIO2 ratio > 150  200, 2. PEEP < 8 cm H20 
3. FIO2 < 50 
4. pH > 7.30 
5. Rapid shallow breathing index (f/VT) < 105 
E. Is patient hemodynamically stable? 1. Absence of clinically significant hypotension (minimal vasopressors such as Dopamine < 5mcg/kg/minute)? F. Is there evidence of intact respiratory drive (NIP/NIF >-28 TNS68, stable VC02)? G. Does patient have an adequate cough, airway clearance ability? H. Is there absence of excessive secretions? V. Initiation: A. The therapist shall consult with RN to determine if sedation can be discontinued or significantly decreased. If this can be achieved, the therapist shall implement the  
                  followin. Identify patient and verify name and account number via ID bracelet. 2. Perform hand hygiene per hospital policy utilizing Standard Precautions for all patients and following transmission-based isolation as indicated per hospital policy. 3. Perform a ONE-MINUTE SPONTANEOUS TRIAL AND ASSESSMENT. 4. Measure Rapid Shallow Breathing Index (RSBI) and monitor SpO2 and cardiovascular parameters during the spontaneous breathing assessment. 5. If SpO2 and cardiovascular parameters are stable, continue spontaneous breathing trial for at least 30 minutes and up to 120 minutes, as patient tolerates. 6. Monitor ventilatory status, SpO2, and cardiovascular status during spontaneous breathing trial. 
7. If patient has a successful trial, consider patient as a candidate for extubation and obtain order. 8. If patient fails the weaning trial, place back on ventilator and adjust settings to provide a non-fatiguing form of ventilatory support for the remainder of the day and night. 9. One attempt at weaning shall be performed each day until successful weaning occurs. The RCP will make every attempt to begin the spontaneous breathing trials between 0500 and 0600 to provide documentation of the trial when the pulmonologist makes rounds. B.  Assessment of SBT or PST: 
1. Is gas exchange acceptable? 2. PaO2 > 60 mm Hg. 3. PH > 7.30 
4. Increase in PaCO2  < 10 mm Hg C. Is patient hemodynamically stable? 1. HR < 120 beats/minute 2. HR  < 20% 3. Systolic BP < 331 and > 90 mmHg 4. BP  < 20%, no vasopressors required D. Does patient have stable ventilatory pattern? 1. Sustained RR < 30 breaths per minute 2. Normal and stable VCO2 3. Patient is not demonstrating any signs of increased work of breathing, such as increased use of accessory muscles. E. Mental status stable throughout trial? 
1. Absence of changes such as somnolence, excessive agitation or anxiety 2. Absence of diaphoresis during trial? 
IV. Safety: A. The RCP shall monitor patient according to the above guidelines. If at any time during the weaning process, the respiratory therapist or nurse feels that the patient is not tolerating weaning, the therapist shall place patient back on previous ventilator settings. B. The patient shall be reassessed and the weaning process should be continued the following day. V. Reportable Conditions: A. The therapist shall notify the physician, as appropriate, for any of the following         conditions: 1. FIO2 increase (sustained) at 10% or greater 2. Poor patient/ventilator interface in spite of adjustments 3. Need for increased sedation for respiratory distress 4. Need for increasing ventilating pressures (i.e. PEEP, PIP, MAP) 5. ABG results meeting panic value criteria or other clinical signs indicating deterioration of patients condition. 6. Unplanned extubation. 7. Unexplained sustained increase in PIP greater than 10 cm H2O. 
8. Assessment results regarding ventilator discontinuance process. VI. Ventilator protocol management A. The following items should be maintained for patients who are being mechanically           ventilated. 1. Obtain STAT Chest X-Ray for ET tube placement after insertion. 2. Chest X-Ray q a.m. while on ventilator. 3. ABGs 30 - 60 minutes after being stable on the ventilator. 4. ABG's q a.m. while on ventilator and prn. 
5. Do spontaneous breathing trials when patient is hemodynamically stable, responsive, and without fever. 6. Terminate trials if patient exhibits signs of respiratory distress. 7. Therapists should maintain ABG s as follows: a. pH -  7.30 - 7.50              
    b. PaO2 -   60  100  
     8. Racemic Epinephrine (0.5cc) for post extubation stridor (2 UA treatments max.) 
 
VII. Early Mobilization Mobility Level Criteria Start at Level 1 if:  
PaO2/FIO2 <250 Positive end-expiratory Pressure (PEEP) >=10 cm H2O  
O2 saturation <90% Respiratory Rate (RR) Not within 10-30 per min Cardiac arrhythmias or ischemia New onset Heart Rate  (HR) <60 or >120 beats per min Mean arterial pressure (MAP) <55 or >140 mmHg Systolic blood pressure (SBP) <90 or > 180 mmHg Vasopressor infusion New or increasing Sterling Agitation Scale (RASS) < - 3 Level I:   Breathe  (Rass -5 to -3) HOB Angle  improve VAP protocol compliance ? Visually confirm the [facility] is elevated >= 30 degrees to comply with VAP prevention protocols ? The Centers for Disease Control and Prevention recommends an HOB angle of 30-45 degrees , unless contraindicated Additional activities to be implemented ? Every 2 hour turning ? Passive range of motion ? Up to 20 degrees reverse trendelenburg with lower extremity exercise/retracting footboard ? Continuous lateral rotation therapy can be considered part of early mobility therapy in patients who are at high risk for pulmonary complications Move to Level 2 when the patient 
- Has acceptable oxygenation/hemodynamics - Tolerates q 2 turning - Tolerates HOB > 30 degrees or up to 20 degrees reverse trendelenburg Level 2 :Tilt  Patient Assessment Rass > -3  (eg, opens eyes, may have profound weakness) Up to 20 degrees Reverse Trendelenburg position and 10 degrees minimum HOB 
- Reverse Trendelenburg positioning allows for orthostatic position in fragile patients - If available , use in conjunction with retracting foot section to allow for partial weight bearing prior to sitting up in the bed or getting out of bed Additional activities to be implemented -  Maintain HOB >/= 30 degrees - Q 2 hour turning - Passive/active range of motion - Legs dependent - PT consultation Move to Level 3 when the patient . Lanette Graven -Tolerates active- assistance exercises 2 times per day 
  -Tolerates lower extremity exercises against footboard/Up to 20 degrees Reverse Trendelenburg 
  -Tolerates legs dependent /HOB 45 degrees Level 3 :  SIT  (Rass >- 1 (eg , weak but may move arms/legs independently) Full chair position (footboard on) ? Full upright positioning allows for diaphragmatic excursion and lung expansion ? Sitting with legs in a dependent position facilitates gas exchange Additional activities to be implemented - Maintain HOB >= 30 degrees - Q 2 hour turning (assisted) - Active range of motion  PT/OT actively involved - Encourage activities of daily living 
- Dangling, if patient can move arm against gravity Move to Level 4 when the patient . Lanette Graven - Tolerates increasing active exercise in bed - Actively assists with every- 2- hour turning or turns independently - Tolerates full chair position 3 times/day Level 4:  Stand ( RASS >0 (eg, weak but may tolerate increased activity) Stand Attempts ? Full chair position (footboard off/feet on the floor) ? Consider using a sit-to-stand lift ? Pivot to chair, it tolerates partial weight bearing Additional activities to be implemented - Maintain head of bed >= 30 degrees - Q 2 hr turning (self/assisted) - Active range of motion - Encourage activities of daily living 
- PT/OT actively involved Move to Level 5 when the patient . 
- Can successfully comply with all activities - Tolerates trial periods of full chair position (footboard off/feet on floor) 3 times per day - Tolerates partial weight-bearing stand and pivots to chair Level 5 :  Move  (RASS > 0    (eg, weak but may tolerate increased activity) Achieve out of bed ? Utilize mobile floor life to ambulate to bedside chair Additional activities to be implemented - Maintain HOB > = 30 degrees - Q 2 hour turning (self/assisted) - Active range of motion - Patient stands/bears weight > 1 minute - Patient marches in place 
- PT/OT actively involved Patient continues to ambulate progressively longer distances as tolerated until they consistently participate and move independently. E Approved by Critical Care Committee 2-19-09 [facility] Clinical Guidelines ABCDE DOC Flowsheet Content Variables to select when addressing section Comments ABCDE Initiated ? Yes/No  RN to address minimum q 24 hours (day shift) Target RASS ? 0 = alert and oriented ? -1 = drowsy ? -2 = light sedation ? -3= moderate sedation ? -4= deep sedation Target on standard ventilator setting should be -2; -4 with oscillator CAM -ICU ? Positive ? Negative ? Unable to assess Delirium assessment SAT Safety Screen Passed ? Yes 
? No Select yes if proceeding on to the sedation vacation (reduction of continuous sedative drip by directed by MD) Select no if your patient has any of the below reasons for not proceeding on to the daily awakening sedation vacation trial  
SAT Screen for Failure ? Active seizures ? Acute delirium tremors ? Agitation that threatens accidental line/tube removal 
? On paralytics ? MI (24-48hr) ? Abnormal ICP ? Open abdomen Select one of the options when the patient will not undergo the sedation vacation  ALSO MUST OBTAIN AN ORDER FOR no Angela  written under nursing miscellaneous for now by either the NP or Intensivist  
Daily sedation Vacation/assessment of  ? Yes 
? No 
? Not applicable If yes, MUST see the reduction in sedation on the [facility] and please place in the comment section of the sedative sedation vacation started SBT Safety Screen Passed ? Yes 
? No Select Yes if the patient has none of the below listed reasons for not proceeding on to the SBT following reduction of sedation SBT Screen Reason for Failure ? Agitation ? O2 Sat < or = 88% 
? FIO2 > 50% ? PEEP >7 
? MI 
? Vasopressor Use 
? Bilevel setting on Vent ? Oscillator in use ? Increased resp effort Select reason as appropriate for NOT proceeding on to the SBT Wake Up and Breathe Protocol

## 2020-12-30 NOTE — PROGRESS NOTES
Since pt finally seemed to be resting and her axillary temp was still low, I attempted to reapply the warming blanket and she immediately woke up and kicked it off. Readjusted 3 times and she still kicks off the blanket immediately.

## 2020-12-30 NOTE — PROGRESS NOTES
Briefed by interdisciplinary staff regarding patient/family needs. Chaplains remain available for patient/family care. Maco Martinez MDiv Board Certified [employer]

## 2020-12-31 NOTE — PROGRESS NOTES
Ventilator check complete; patient has a #7.5 ET tube secured at the 21 at the lip. Patient is sedated. Patient is not able to follow commands. Breath sounds are diminished. Trachea is midline, Negative for subcutaneous air, and chest excursion is symmetric. Patient is also Negative for cyanosis and is Positive for pitting edema. All alarms are set and audible. Resuscitation bag is at the head of the bed. Ventilator Settings Mode FIO2 Rate Tidal Volume Pressure PEEP I:E Ratio VC+  45 % 28 400 ml     12 cm H20  1:1.4 Peak airway pressure: 30 cm H2O Minute ventilation: 12 l/min ABG: Results for Sinai Brown (MRN [id number]) as of 12/31/2020 09:14 
 12/31/2020 05:16 Critical value read back 05:15  
pH (POC) 7.35  
pCO2 (POC) 39.6  
pO2 (POC) 85 HCO3 (POC) 22.1  
sO2 (POC) 96 Base deficit (POC) 3 FIO2 (POC) 45 Specimen type (POC) ARTERIAL Set Rate 28 Site RIGHT RADIAL Device: VENT Mode ASSIST CONTROL Tidal volume 400 PEEP/CPAP (POC) 12 Allens test (POC) YES Respiratory comment: NurseNotified Inspiratory Time 0.9 COLLECT TIME 505 Florence Lowery

## 2020-12-31 NOTE — PROGRESS NOTES
Marcia Arnold Admission Date: 12/24/2020 Daily Progress Note: 12/31/2020 The patient's chart is reviewed and the patient is discussed with the staff. Patient has DM/CAD/HTN/CKD and presented with dyspnea. Saturation were 44% per EMS and in ER was placed on BIPAP since did not improve with NC. On 100% BIPAP and saturation only 93%. Recent covid exposure. Here the rapid Covid test was positive. ABG noted pH 7.31/41/70 on BIPAP 100%. CXR with diffuse infiltrates. Also noted worsening kidney function. Admitted to ICU 12/24. Given plasma and steroids. Creatinine too high for remdesivir. Nephrology consulted 12/26 for worsening renal failure. Subjective: CODE BLUE yesterday, brief. Intubated and started on dopamine for bradycardia, hypotension. More stable now, but requiring 13-14 of dopamine. Awaiting cardiology consult. FiO2 only 45% Current Facility-Administered Medications Medication Dose Route Frequency  calcium gluconate 1 gram in sodium chloride (ISO-OSM) 50 mL infusion  1 g IntraVENous ONCE  
 [START ON 1/1/2021] polyethylene glycol (MIRALAX) packet 17 g  17 g Oral DAILY  dextrose 5% infusion  50 mL/hr IntraVENous CONTINUOUS  
 fentaNYL in normal saline (pf) 25 mcg/mL infusion  0-200 mcg/hr IntraVENous TITRATE  midazolam in normal saline (VERSED) 1 mg/mL infusion  0-10 mg/hr IntraVENous TITRATE  ascorbic acid (vitamin C) (VITAMIN C) tablet 1,000 mg  1,000 mg Per NG tube BID  cholecalciferol (VITAMIN D3) (1000 Units /25 mcg) tablet 5 Tab  5,000 Units Per NG tube DAILY  hydrALAZINE (APRESOLINE) tablet 10 mg  10 mg Per NG tube TID  zinc sulfate tablet 220 mg  220 mg Per G Tube DAILY  NOREPINephrine (LEVOPHED) 4 mg in 5% dextrose 250 mL infusion  0.5-16 mcg/min IntraVENous TITRATE  DOPamine (INTROPIN) 800 mg in dextrose 5% 250 mL infusion  0-50 mcg/kg/min IntraVENous TITRATE • famotidine (PF) (PEPCID) 20 mg in 0.9% sodium chloride 10 mL injection  20 mg IntraVENous DAILY  
• dextrose 40% (GLUTOSE) oral gel 1 Tube  15 g Oral PRN  
• glucagon (GLUCAGEN) injection 1 mg  1 mg IntraMUSCular PRN  
• dextrose (D50W) injection syrg 12.5-25 g  25-50 mL IntraVENous PRN  
• albuterol (PROVENTIL HFA, VENTOLIN HFA, PROAIR HFA) inhaler 2 Puff  2 Puff Inhalation Q4H PRN  
• hydrALAZINE (APRESOLINE) 20 mg/mL injection 20 mg  20 mg IntraVENous Q2H PRN  
• insulin lispro (HUMALOG) injection 0-15 Units  0-15 Units SubCUTAneous AC&HS  
• [Held by provider] insulin glargine (LANTUS) injection 20 Units  20 Units SubCUTAneous BID  
• phenol throat spray (CHLORASEPTIC) 1 Spray  1 Spray Oral PRN  
• lip protectant (BLISTEX) ointment 1 Each  1 Each Topical PRN  
• acetaminophen (TYLENOL) tablet 650 mg  650 mg Oral Q6H PRN  
• cefTRIAXone (ROCEPHIN) 1 g in 0.9% sodium chloride (MBP/ADV) 50 mL MBP  1 g IntraVENous Q24H  
• 0.9% sodium chloride infusion 250 mL  250 mL IntraVENous PRN  
• sodium chloride (NS) flush 5-40 mL  5-40 mL IntraVENous Q8H  
• sodium chloride (NS) flush 5-40 mL  5-40 mL IntraVENous PRN  
• heparin (porcine) injection 5,000 Units  5,000 Units SubCUTAneous Q8H  
• dexamethasone (DECADRON) 4 mg/mL injection 6 mg  6 mg IntraVENous DAILY  
• albuterol (PROVENTIL VENTOLIN) nebulizer solution 2.5 mg  2.5 mg Nebulization Q6H RT  
 
Review of Systems 
Intubated, sedated 
 
Objective:  
 
Vitals:  
 12/31/20 0930 12/31/20 0931 12/31/20 1000 12/31/20 1015  
BP:  (!) 140/67 135/73 (!) 148/73  
Pulse: 70 70 79 73  
Resp: 28 27 28 27  
Temp:      
SpO2: 97% 97% 96% 96%  
Weight:      
Height:      
 
Intake and Output:  
12/29 1901 - 12/31 0700 
In: 1369.4 [P.O.:720; I.V.:589.4] 
Out: 1350 [Urine:1350] 
12/31 0701 - 12/31 1900 
In: 80  
Out: -  
 
Physical Exam:  
Constitution:  the patient is elderly, sedated and in no acute distress 
EENMT:  Sclera clear, pupils equal, oral mucosa moist 
 Respiratory: few scattered crackles Cardiovascular:  RRR without M,G,R 
Gastrointestinal: soft and non-tender; with positive bowel sounds. Musculoskeletal: warm without cyanosis. There is no lower leg edema. Skin:  no jaundice or rashes, no wounds Neurologic: no gross neuro deficits Psychiatric:  Calm sedated CHEST XRAY: 12/31: intubated, improved edema, lines in adequate position CXR Results  (Last 48 hours) 12/31/20 0544  XR CHEST SNGL V Final result Impression:  IMPRESSION: Mildly improved bilateral lung edema or infiltrates. Narrative:  EXAM: Chest x-ray. INDICATION: Dyspnea. Covid 19. COMPARISON: Yesterday's chest x-ray. TECHNIQUE: Frontal view chest x-ray. FINDINGS: Diffuse bilateral lung edema or infiltrates have mildly improved. The  
cardiac size is stable. There has been a prior sternotomy. No pneumothorax or  
pleural effusion is seen. The endotracheal tube, enteric tube and left jugular  
central line remain in place. The endotracheal tube tip lies 3.8 cm above the  
troy. 12/30/20 1000  XR CHEST SNGL V Final result Impression:  IMPRESSION:  
   
1. Left central venous catheter placed with tip overlying the right atrium. No  
postprocedural pneumothorax. 2. Worsening low lung volumes. Otherwise unchanged diffuse bilateral pulmonary  
opacities. 3. Support lines and tubes otherwise as above. VOICE DICTATED BY: Dr. Mike Hutchinson Narrative:  EXAMINATION: CHEST RADIOGRAPH 12/30/2020 10:00 AM  
   
ACCESSION NUMBER: [id number] INDICATION: cvc placement COMPARISON: Prior chest x-ray 12/30/2020 TECHNIQUE: A single AP view of the chest was obtained. FINDINGS:   
   
Support Lines and Tubes: Left central venous catheter placed with tip overlying  
the right atrium. Otherwise unchanged positioning of support lines and tubes. Cardiac Silhouette: Stable in caliber. Lungs: Worsening low lung volumes. Unchanged diffuse bilateral pulmonary  
opacities. Pleura: Likely trace left pleural effusion. No definite pneumothorax. Osseous Structures: Prior median sternotomy. Upper Abdomen: Unremarkable. 12/30/20 0832  XR CHEST SNGL V Final result Impression:  IMPRESSION:  
   
1. Endotracheal tube tip positioned 2.1 cm above the troy. 2. There is unchanged mild enlargement of the cardiac silhouette. 3. Diffuse bilateral airspace opacities are unchanged in the setting of a known COVID 19 infection. VOICE DICTATED BY: Dr. Keyn Shepard Narrative:  EXAMINATION: CHEST RADIOGRAPH 12/30/2020 8:32 AM  
   
ACCESSION NUMBER: [id number] INDICATION: ETT PLACEMENT  
   
COMPARISON: Chest x-ray 12/30/2020, 12/29/2020 TECHNIQUE: A single AP view of the chest was obtained. FINDINGS:   
   
Support Lines and Tubes: Endotracheal tube tip overlies the tracheal air column 2.1 cm above the troy. Cardiac Silhouette: There is unchanged mild enlargement of the cardiac  
silhouette. Prior median sternotomy. Lungs: Diffuse bilateral airspace opacities are unchanged from the prior. Pleura: Unchanged trace left pleural effusion. No pneumothorax. Osseous Structures: Thoracic spine spondylosis. Unchanged healed fracture of the  
posterior left second rib. Upper Abdomen: Unremarkable. 12/30/20 0250  XR CHEST SNGL V Final result Impression:  IMPRESSION: Unchanged bilateral lung edema or infiltrates. Narrative:  EXAM: Chest x-ray. INDICATION: Dyspnea. Covid 19. COMPARISON: Yesterday's chest x-ray. TECHNIQUE: Frontal view chest x-ray. FINDINGS: Diffuse bilateral lung edema or infiltrates are unchanged. The cardiac  
size is stable, with evidence of a prior sternotomy. No pneumothorax or pleural  
effusion is seen. LAB No lab exists for component: [place] Recent Labs 12/31/20 
0411 12/30/20 
0258 12/29/20 
9396 WBC 12.4* 15.4* 14.8* HGB 10.6* 11.9 10.9* HCT 32.3* 35.8 32.5*  
 238 260 Recent Labs 12/31/20 
0411 12/30/20 
0258 12/29/20 
1110  138 139  
K 5.8* 4.8 4.8  
* 112* 109* CO2 23 22 22 GLU 53* 135* 165* BUN 85* 78* 78* CREA 3.36* 3.18* 3.12* MG 2.9* 2.8*  --   
CA 8.2* 8.3 8.3 PHOS 5.2* 4.3*  --   
ALB 2.3*  --   --   
 
ABG:   
Lab Results Component Value Date/Time PHI 7.35 12/31/2020 05:16 AM  
 PCO2I 39.6 12/31/2020 05:16 AM  
 PO2I 85 12/31/2020 05:16 AM  
 HCO3I 22.1 12/31/2020 05:16 AM  
 FIO2I 45 12/31/2020 05:16 AM  
 
MICRO 
SARS-CoV-2 LAB Results [facility] Test: No results found for: COV2NT  
[facility] Test: No results found for: [facility] Test: No components found for: [id number] Urine - 50-100k CFU e coli. 10-50 CFU skin farheen. Assessment:  (Medical Decision Making) Hospital Problems  Date Reviewed: 8/10/2019 Codes Class Noted POA Cardiac arrest [facility]) ICD-10-CM: I46.9 ICD-9-CM: 427.5  12/30/2020 Unknown Only lasted about 10 seconds on monitor but desat into 20's came right back. Acute cystitis without hematuria ICD-10-CM: N30.00 ICD-9-CM: 595.0  12/29/2020 Unknown COVID-19 ICD-10-CM: U07.1 ICD-9-CM: 079.89  12/24/2020 Unknown Ongoing. * (Principal) Acute respiratory distress syndrome (ARDS) due to severe acute respiratory syndrome coronavirus 2 (SARS-CoV-2) ([facility]) ICD-10-CM: U07.1, J80 
ICD-9-CM: 518.82, 079.89  12/24/2020 Unknown Acute hypoxemic respiratory failure ([facility] [address] ICD-10-CM: J96.01 
ICD-9-CM: 518.81  12/24/2020 Unknown Worse. Sats poor overnight. Now intubated. Acute on chronic renal failure (HCC) ICD-10-CM: N17.9, N18.9 ICD-9-CM: 584.9, 585.9  9/17/2019 Unknown Cr coming down. Coronary artery disease involving coronary bypass graft of native heart without angina pectoris (Chronic) ICD-10-CM: L71.891 ICD-9-CM: 414.05  6/15/2015 Yes Uncontrolled type 2 diabetes mellitus with hyperglycemia (HCC) (Chronic) ICD-10-CM: E11.65 ICD-9-CM: 250.02  6/15/2015 Yes Diabetes mellitus with hyperosmolarity without hyperglycemic hyperosmolar nonketotic coma (HCC) (Chronic) ICD-10-CM: E11.00 ICD-9-CM: 250.20  11/24/2014 Yes Patient with ARDS, severe acute hypoxic due to COVID PNA. Brief arrest this AM and now intubated for ongoing hypoxemia and respiratory instability. Plan:  (Medical Decision Making)  
 
-full vent support for now. 
-continue versed, fentanyl 
-cont dexamethasone, EOT 1/2.  
-not a candidate for remdesivir due to PRATIMA. -nephro following for PRATIMA, slightly worse, but UOP is adequate 
-cardiology consult for suspected cardiogenic shock, bradycardia, 2/2 COVID? -request echo, will check CVP, NT-pro-BNP, CRP and PCT 
-hold lantus with hypoglycemia 
-start D5 50cc/hr for now, will ask nutrition about trying trickle feeds 
-cont heparin 5000 q 8 
-cont protonix.   
-getting ceftriaxone for e coli in urine. -CVL for drips Called daughter Adi Woods with update [phone]) Critically ill. E/M 33 minutes Kaur Galvan MD

## 2020-12-31 NOTE — PROGRESS NOTES
PT BP DROPPING TO LOW 40'S/ 35 min after transitioning patient off of dopamine. CVP 2 MD SINE called/ new orders for 1L bolus/ start levo/ add dopamine back 1330 - patient maxed on levo and on dopmaine and dobutamine 1400 - slowly able to wean levo down / dobutamine cut in half 1430 - dobutamine off / levo further weaned 1500 - dobutamine and levo off / pt back on dopamine at 15

## 2020-12-31 NOTE — PROGRESS NOTES
Bedside, Verbal and Written shift change report given to NANCY RN  (oncoming nurse) by Inocencia Herndon RN  (offgoing nurse). Report included the following information SBAR, Kardex, ED Summary, Procedure Summary, Intake/Output, MAR, Recent Results, Med Rec Status, Cardiac Rhythm SB/ NSR, Alarm Parameters  and Quality Measures. DUAL MED VERIFICATION ON FENT AND VERSED 
 
PT REPOSITIONED AND [facility] PREFORMED

## 2020-12-31 NOTE — CONSULTS
[facility] Cardiology Consult Date of  Admission: 12/24/2020  1:08 PM  
 
Primary Care Physician: Lawyer Luz MD 
Primary Cardiologist: Dr Larry Tuttle Referring Physician: Dr Leigha Coronado Consulting Physician: Dr Clement Roles CC/Reason for consult: Bradycardia while on Precedex Beverly Carver is a 78 y.o. female with hx of CAD with stents bypass surgery on January 15, 2014, diabetes mellitus, hyperlipidemia, hypertension, peripheral neuropathy, gout, and cystitis. Was admitted on 12/24/2020 with complaints of shortness of breath and brought in by EMS to the ED with a BiPAP in place. Initially with BiPAP in place of 100% O2 saturation were 93% with recent Covid exposure with a rapid Covid test being positive. Chest showed diffuse infiltrates and has had worsening renal function as well. Patient was admitted to ICU on [id number] and received convalescent plasma and IV steroids but was not able to receive remdesivir due to high elevated creatinines. The patient is on full vent support with Versed and fentanyl used for sedation. Patient was taken off of Precedex due to bradycardia. Of note patient also has E. coli UTI and placed on IV antibiotics. Nephrology was consulted and found the patient to be PRATIMA on CKD and possibly Covid ATN and is now oliguric. She continues to have worsening renal function as high as 3.36 today from a baseline of around 1.8 in admission creatinine of 2.8 currently no indication for acute RRT per nephrology. On 12/30/2020 CODE BLUE was called and patient showed to have a long pause on the monitor. \"Nursing staff precordial thump the patient\" and the patient had interment bradycardia and tachypnea overnight. The next day the patient was intubated for worsening respiratory function. Review of telemetry shows intermittent sinus bradycardia heart rate in the 40s with to 2-1/2 to 3 seconds sinus pauses. Patient now has improved heart rate with rates between 69 and 79 still sinus in nature. Patient is currently on dopamine at 13.5 mcg/kg/min. Recent Cardiac Synopsis w/ Labs Echo: 8/19 -  Left ventricle: Systolic function was normal. Ejection fraction was 
estimated in the range of 55 % to 60 %. There were no regional wall motion 
abnormalities.  -  Left atrium: The atrium was mild to moderately dilated. EKG: Bradycardia Past Medical History:  
Diagnosis Date  Acute cystitis without hematuria 1/30/2019  CAD (coronary artery disease)  DM2 (diabetes mellitus, type 2) ([facility]  Gout  HLD (hyperlipidemia)  HTN (hypertension)  Peripheral neuropathy Past Surgical History:  
Procedure Laterality Date  CARDIAC SURG PROCEDURE UNLIST    
 stents x 3 2009  HX CHOLECYSTECTOMY jennifer in 1964  HX CORONARY ARTERY BYPASS GRAFT    
 x3  
 HX TUBAL LIGATION Allergies Allergen Reactions  Sulfa (Sulfonamide Antibiotics) Swelling Family History Problem Relation Age of Onset  Hypertension Mother  Cancer Mother  Diabetes Mother  Heart Disease Mother Social History Socioeconomic History  Marital status: SINGLE Spouse name: Not on file  Number of children: Not on file  Years of education: Not on file  Highest education level: Not on file Occupational History  Occupation: retired  Social Needs  Financial resource strain: Not on file  Food insecurity Worry: Not on file Inability: Not on file  Transportation needs Medical: Not on file Non-medical: Not on file Tobacco Use  Smoking status: Never Smoker  Smokeless tobacco: Never Used Substance and Sexual Activity  Alcohol use: Yes Comment: socially  Drug use: No  
 Sexual activity: Not on file Lifestyle  Physical activity Days per week: Not on file Minutes per session: Not on file  Stress: Not on file Relationships  Social connections Talks on phone: Not on file Gets together: Not on file Attends [employer] service: Not on file Active member of club or organization: Not on file Attends meetings of clubs or organizations: Not on file Relationship status: Not on file  Intimate partner violence Fear of current or ex partner: Not on file Emotionally abused: Not on file Physically abused: Not on file Forced sexual activity: Not on file Other Topics Concern  Not on file Social History Narrative Lives alone but has a caregiver who helps several hours per day, several days per week Current Facility-Administered Medications Medication Dose Route Frequency  calcium gluconate 1 gram in sodium chloride (ISO-OSM) 50 mL infusion  1 g IntraVENous ONCE  
 [START ON 1/1/2021] polyethylene glycol (MIRALAX) packet 17 g  17 g Oral DAILY  dextrose 5% infusion  50 mL/hr IntraVENous CONTINUOUS  
 sodium zirconium cyclosilicate (LOKELMA) powder packet 5 g  5 g Oral DAILY  fentaNYL in normal saline (pf) 25 mcg/mL infusion  0-200 mcg/hr IntraVENous TITRATE  midazolam in normal saline (VERSED) 1 mg/mL infusion  0-10 mg/hr IntraVENous TITRATE  ascorbic acid (vitamin C) (VITAMIN C) tablet 1,000 mg  1,000 mg Per NG tube BID  cholecalciferol (VITAMIN D3) (1000 Units /25 mcg) tablet 5 Tab  5,000 Units Per NG tube DAILY  hydrALAZINE (APRESOLINE) tablet 10 mg  10 mg Per NG tube TID  zinc sulfate tablet 220 mg  220 mg Per G Tube DAILY  NOREPINephrine (LEVOPHED) 4 mg in 5% dextrose 250 mL infusion  0.5-16 mcg/min IntraVENous TITRATE  DOPamine (INTROPIN) 800 mg in dextrose 5% 250 mL infusion  0-50 mcg/kg/min IntraVENous TITRATE  famotidine (PF) (PEPCID) 20 mg in 0.9% sodium chloride 10 mL injection  20 mg IntraVENous DAILY  dextrose 40% (GLUTOSE) oral gel 1 Tube  15 g Oral PRN  
 glucagon (GLUCAGEN) injection 1 mg  1 mg IntraMUSCular PRN  
 dextrose (D50W) injection syrg 12.5-25 g  25-50 mL IntraVENous PRN  
 albuterol (PROVENTIL HFA, VENTOLIN HFA, PROAIR HFA) inhaler 2 Puff  2 Puff Inhalation Q4H PRN  
 hydrALAZINE (APRESOLINE) 20 mg/mL injection 20 mg  20 mg IntraVENous Q2H PRN  
 insulin lispro (HUMALOG) injection 0-15 Units  0-15 Units SubCUTAneous AC&HS  
  [Held by provider] insulin glargine (LANTUS) injection 20 Units  20 Units SubCUTAneous BID  phenol throat spray (CHLORASEPTIC) 1 Spray  1 Spray Oral PRN  
 lip protectant (BLISTEX) ointment 1 Each  1 Each Topical PRN  
 acetaminophen (TYLENOL) tablet 650 mg  650 mg Oral Q6H PRN  
 cefTRIAXone (ROCEPHIN) 1 g in 0.9% sodium chloride (MBP/ADV) 50 mL MBP  1 g IntraVENous Q24H  
 0.9% sodium chloride infusion 250 mL  250 mL IntraVENous PRN  
 sodium chloride (NS) flush 5-40 mL  5-40 mL IntraVENous Q8H  
 sodium chloride (NS) flush 5-40 mL  5-40 mL IntraVENous PRN  
 heparin (porcine) injection 5,000 Units  5,000 Units SubCUTAneous Q8H  
 dexamethasone (DECADRON) 4 mg/mL injection 6 mg  6 mg IntraVENous DAILY  albuterol (PROVENTIL VENTOLIN) nebulizer solution 2.5 mg  2.5 mg Nebulization Q6H RT  
 
 
Review of Systems Unable to perform ROS: intubated Physical Exam 
Vitals:  
 12/31/20 0930 12/31/20 0931 12/31/20 1000 12/31/20 1015 BP:  (!) 140/67 135/73 (!) 148/73 Pulse: 70 70 79 73 Resp: 28 27 28 27 Temp:      
SpO2: 97% 97% 96% 96% Weight:      
Height:      
 
 
Last 3 Recorded Weights in this Encounter 12/28/20 4759 12/29/20 2214 12/31/20 8199 Weight: 71.4 kg (157 lb 6.5 oz) 72 kg (158 lb 11.7 oz) 70.2 kg (154 lb 12.2 oz) Physical Exam: 
 
Physical exam defaulted to attending due to COVID-19 infection for the preservation of PPE and lessen the spread of the virus. Labs:  
Recent Labs 12/31/20 
0411 12/30/20 
2811  138  
K 5.8* 4.8 MG 2.9* 2.8*  
BUN 85* 78* CREA 3.36* 3.18* GLU 53* 135* WBC 12.4* 15.4* HGB 10.6* 11.9 HCT 32.3* 35.8  238 Xr Chest Sngl V Result Date: 12/31/2020 IMPRESSION: Mildly improved bilateral lung edema or infiltrates. Xr Chest Sngl V Result Date: 12/30/2020 IMPRESSION: 1. Left central venous catheter placed with tip overlying the right atrium. No postprocedural pneumothorax. 2. Worsening low lung volumes. Otherwise unchanged diffuse bilateral pulmonary opacities. 3. Support lines and tubes otherwise as above. VOICE DICTATED BY: Dr. Heidi Gowers Xr Chest Sngl V Result Date: 12/30/2020 IMPRESSION: 1. Endotracheal tube tip positioned 2.1 cm above the troy. 2. There is unchanged mild enlargement of the cardiac silhouette. 3. Diffuse bilateral airspace opacities are unchanged in the setting of a known COVID 19 infection. VOICE DICTATED BY: Dr. Heidi Gowers Xr Chest Sngl V Result Date: 12/30/2020 IMPRESSION: Unchanged bilateral lung edema or infiltrates. Xr Abd (kub) Result Date: 12/30/2020 IMPRESSION: The enteric tube tip overlies the left lower quadrant of the abdomen, likely overlying the gastric body. VOICE DICTATED BY: Dr. Heidi Gowers Xr Chest [facility] Result Date: 12/29/2020 IMPRESSION: Progressed diffuse bilateral lung infiltrates or edema. Xr Chest [facility] Result Date: 12/24/2020 Impression: Extensive interstitial and parenchymal opacities would raise suspicion for viral pneumonitis given the provided history. Pulmonary edema could also have this appearance. Assessment/Plan: 
 
 Assessment:  
  
Principal Problem: 
  Acute respiratory distress syndrome (ARDS) due to severe acute respiratory syndrome coronavirus 2 (SARS-CoV-2) ([facility] [address] (12/24/2020) per primary team patient currently intubated. Active Problems: 
  Diabetes mellitus with hyperosmolarity without hyperglycemic hyperosmolar nonketotic coma ([address] (11/24/2014) per primary team known steroids Coronary artery disease involving coronary bypass graft of native heart without angina pectoris (6/15/2015) patient has a history of CAD   Uncontrolled type 2 diabetes mellitus with hyperglycemia ([address] (6/15/2015) per primary team 
 Acute on chronic renal failure ([facility] [address] (9/17/2019) per primary team nephrology on board COVID-19 (12/24/2020) per primary team 
 
  Acute hypoxemic respiratory failure ([facility] [address] (12/24/2020) primary team patient is currently intubated and Covid ICU Acute cystitis without hematuria (12/29/2020) per primary team being treated with antibiotics Cardiac arrest ([facility] [address] (12/30/2020) appears to be bradycardic in nature while on Precedex and the setting of COVID-19. Patient was intubated the next day. We will continue to hold Precedex and use other means of sedation. Avoid rate slowing medications. Rates initially improved but now 55 while on team 25 mcg/kg/min of dopamine. Will continue to monitor on telemetry. Request for echo by primary team we will default per attending in the setting of COVID-19 infection. No further slowing of rates or bradycardia seen. Electrolyte imbalances with mag of 2.9, creatinine of 5.8, creatinine of 3.36 which is worsening. Recommendations pending attending and patient's clinical course. I will add dobutrex to see if HR goes up. Will get limited echo tosee if there is evidence of covid CMP also could benefit from potassium being addressed. Thank you very much for this referral. We appreciate the opportunity to participate in this patient's care. We will follow along with above stated plan. Laury Kerr NP [facility] MD: Dr Michael Barraza

## 2020-12-31 NOTE — PROGRESS NOTES
Comprehensive Nutrition Assessment This assessment was completed remotely Type and Reason for Visit: Initial, Consult Tube Feeding Management (Pulmonary) Nutrition Recommendations/Plan:  
? Change POC glucoses and SSI from ac and hs to every 6 hrs ? Expect need for Lantus to continue to be held. ? Bowel regimen while on fentanyl drip: Add 2 tabs Pericolace daily and continue Miralax once daily. Enteral Nutrition: 
Initiate trophic TF via OGT of Vital AF 1.2 at 10ml/hour, do not advance. Water flush 60 every 4 hours. This will provide 288 kcal (20% estimated calorie needs), 18 grams protein (32% estimated protein needs) , 26 grams CHO, 550 ml free fluid and 395 mg K per day. Follow up tolerance tomorrow and evaluate for progression and whether change to renal formula is needed. IV Fluids: 
Per MD. Vitamin and Mineral Supplement Therapy: 
Electrolyte management replacement protocol implemented. Labs:  
EN labs: BMP daily, Mg and Phos MWF. Malnutrition Assessment: 
Malnutrition Status: Insufficient data Nutrition Assessment:  
Nutrition History: 3 recorded meals in past 6 days with average intake of 83% Nutrition Background: H/O: CAD, HTN, DM, HLD, peripheral neuropathy, CKD stage III. Presented with dyspnea. Findings of +COVID. Admitted to ICU with ARDS, severe acute hypoxic due to COVID PNA, PRATIMA on CKD. Was requiring BIPAP at night and Aviro during day. Had brief cardiac arrest and was intubated 12/30 Daily Update: 
Remains on vent, MAP consistently >65 with pressor support. Could attempt trophic feeding but would not advance Abdominal Status (last documented): Intact, Semi-soft abdomen with Active  bowel sounds. Last BM 12/29/20. Pertinent Medications: Vitamin C, rocephin, vitamin D, decadron, dobutamine(5 mcg/kg/min), Dopamine (off at 1300), pepcid, fentanyl drip, Lantus (20 units bid-held today), SSI (none yesterday or today thus far), versed, lokelma, zinc, miralax 1 gram calcium and amp D50 this AM 
IVF: D5 at 50 ml/hr-not started, holding unless needed for hypoglycemia per RN Pertinent Labs:  
Lab Results Component Value Date/Time Sodium 143 12/31/2020 04:11 AM  
 Potassium 5.8 (H) 12/31/2020 04:11 AM  
 Chloride 114 (H) 12/31/2020 04:11 AM  
 CO2 23 12/31/2020 04:11 AM  
 Anion gap 6 (L) 12/31/2020 04:11 AM  
 Glucose 53 (L) 12/31/2020 04:11 AM  
 BUN 85 (H) 12/31/2020 04:11 AM  
 Creatinine 3.36 (H) 12/31/2020 04:11 AM  
 Calcium 8.2 (L) 12/31/2020 04:11 AM  
 Albumin 2.3 (L) 12/31/2020 04:11 AM  
 Phosphorus 5.2 (H) 12/31/2020 04:11 AM  
POC glucoses  for past day-low likely d/t Lantus administration yesterday and then no po intake once intubated Nutrition Related Findings:  
NFPE deferred d/t isolation. Intubated and OGT placed 12/30. Current Nutrition Therapies: DIET NPO Current Intake: Average Meal Intake: NPO Anthropometric Measures: 
Height: 5' 5\" (165.1 cm) Current Body Wt: 70.2 kg (154 lb 12.2 oz), Weight source: Bed scale BMI: 25.8, Overweight (BMI 25.0-29. 9) Admission Body Weight: 159 lb 2.8 oz(Bedscale) Usual Body Wt: 73.5 kg (162 lb)(Per EMR 11/5/82020), Percent weight change: -4.5 Estimated Daily Nutrient Needs: 
Energy (kcal/day): 0498-8229 kcal/d (Kcal/kg(25-30), Weight Used: Ideal(57 kg)) Protein (g/day): 57-74 grams/d (1-1.3 grams/kg) Weight Used: (Ideal) Fluid (ml/day):   (1 ml/kcal) Nutrition Diagnosis:  
· Inadequate oral intake related to impaired respiratory function as evidenced by intubation(precludes po intake) Nutrition Interventions:  
Food and/or Nutrient Delivery: Modify current diet, Start tube feeding Coordination of Nutrition Care: Continue to monitor while inpatient Plan of Care discussed with Yecenia Weesk RN Goals: Active Goal: Intake >50% of needs within 5 days Nutrition Monitoring and Evaluation: Food/Nutrient Intake Outcomes: Enteral nutrition intake/tolerance Physical Signs/Symptoms Outcomes: Hemodynamic status, Biochemical data, GI status Discharge Planning: Too soon to determine Fatimah Box, [address], [address] [place] [address], [address] [place] [address] Disaster Mode active

## 2021-01-01 ENCOUNTER — APPOINTMENT (OUTPATIENT)
Dept: GENERAL RADIOLOGY | Age: 81
DRG: 004 | End: 2021-01-01
Attending: INTERNAL MEDICINE
Payer: MEDICARE

## 2021-01-01 ENCOUNTER — APPOINTMENT (OUTPATIENT)
Dept: GENERAL RADIOLOGY | Age: 81
DRG: 291 | End: 2021-01-01
Attending: EMERGENCY MEDICINE
Payer: MEDICARE

## 2021-01-01 ENCOUNTER — APPOINTMENT (OUTPATIENT)
Dept: GENERAL RADIOLOGY | Age: 81
DRG: 004 | End: 2021-01-01
Attending: EMERGENCY MEDICINE
Payer: MEDICARE

## 2021-01-01 ENCOUNTER — APPOINTMENT (OUTPATIENT)
Dept: MRI IMAGING | Age: 81
DRG: 291 | End: 2021-01-01
Attending: INTERNAL MEDICINE
Payer: MEDICARE

## 2021-01-01 ENCOUNTER — HOSPITAL ENCOUNTER (OUTPATIENT)
Age: 81
Discharge: NURSING, REHAB OR LONG-TERM CARE | End: 2021-03-25
Attending: INTERNAL MEDICINE | Admitting: INTERNAL MEDICINE
Payer: MEDICARE

## 2021-01-01 ENCOUNTER — APPOINTMENT (OUTPATIENT)
Dept: GENERAL RADIOLOGY | Age: 81
DRG: 870 | End: 2021-01-01
Attending: INTERNAL MEDICINE
Payer: MEDICARE

## 2021-01-01 ENCOUNTER — APPOINTMENT (OUTPATIENT)
Dept: GENERAL RADIOLOGY | Age: 81
DRG: 154 | End: 2021-01-01
Attending: STUDENT IN AN ORGANIZED HEALTH CARE EDUCATION/TRAINING PROGRAM
Payer: MEDICARE

## 2021-01-01 ENCOUNTER — ANESTHESIA (OUTPATIENT)
Dept: ENDOSCOPY | Age: 81
DRG: 291 | End: 2021-01-01
Payer: MEDICARE

## 2021-01-01 ENCOUNTER — HOSPITAL ENCOUNTER (OUTPATIENT)
Dept: LAB | Age: 81
Discharge: HOME | End: 2021-04-05

## 2021-01-01 ENCOUNTER — APPOINTMENT (OUTPATIENT)
Dept: GENERAL RADIOLOGY | Age: 81
DRG: 004 | End: 2021-01-01
Attending: NURSE PRACTITIONER
Payer: MEDICARE

## 2021-01-01 ENCOUNTER — APPOINTMENT (OUTPATIENT)
Dept: CT IMAGING | Age: 81
DRG: 004 | End: 2021-01-01
Attending: INTERNAL MEDICINE
Payer: MEDICARE

## 2021-01-01 ENCOUNTER — ANESTHESIA (OUTPATIENT)
Dept: ENDOSCOPY | Age: 81
DRG: 004 | End: 2021-01-01
Payer: MEDICARE

## 2021-01-01 ENCOUNTER — APPOINTMENT (OUTPATIENT)
Dept: CT IMAGING | Age: 81
DRG: 154 | End: 2021-01-01
Attending: STUDENT IN AN ORGANIZED HEALTH CARE EDUCATION/TRAINING PROGRAM
Payer: MEDICARE

## 2021-01-01 ENCOUNTER — ANESTHESIA EVENT (OUTPATIENT)
Dept: ENDOSCOPY | Age: 81
DRG: 641 | End: 2021-01-01
Payer: MEDICARE

## 2021-01-01 ENCOUNTER — APPOINTMENT (OUTPATIENT)
Dept: MRI IMAGING | Age: 81
DRG: 870 | End: 2021-01-01
Attending: PSYCHIATRY & NEUROLOGY
Payer: MEDICARE

## 2021-01-01 ENCOUNTER — APPOINTMENT (OUTPATIENT)
Dept: GENERAL RADIOLOGY | Age: 81
DRG: 291 | End: 2021-01-01
Attending: FAMILY MEDICINE
Payer: MEDICARE

## 2021-01-01 ENCOUNTER — APPOINTMENT (OUTPATIENT)
Dept: GENERAL RADIOLOGY | Age: 81
End: 2021-01-01
Attending: INTERNAL MEDICINE
Payer: MEDICARE

## 2021-01-01 ENCOUNTER — ANESTHESIA (OUTPATIENT)
Dept: ENDOSCOPY | Age: 81
DRG: 641 | End: 2021-01-01
Payer: MEDICARE

## 2021-01-01 ENCOUNTER — APPOINTMENT (OUTPATIENT)
Dept: GENERAL RADIOLOGY | Age: 81
DRG: 154 | End: 2021-01-01
Attending: EMERGENCY MEDICINE
Payer: MEDICARE

## 2021-01-01 ENCOUNTER — HOSPITAL ENCOUNTER (INPATIENT)
Age: 81
LOS: 4 days | Discharge: NURSING, REHAB OR LONG-TERM CARE | DRG: 291 | End: 2021-04-20
Attending: EMERGENCY MEDICINE | Admitting: HOSPITALIST
Payer: MEDICARE

## 2021-01-01 ENCOUNTER — APPOINTMENT (OUTPATIENT)
Dept: GENERAL RADIOLOGY | Age: 81
DRG: 154 | End: 2021-01-01
Attending: INTERNAL MEDICINE
Payer: MEDICARE

## 2021-01-01 ENCOUNTER — ANESTHESIA EVENT (OUTPATIENT)
Dept: ENDOSCOPY | Age: 81
DRG: 004 | End: 2021-01-01
Payer: MEDICARE

## 2021-01-01 ENCOUNTER — APPOINTMENT (OUTPATIENT)
Dept: INTERVENTIONAL RADIOLOGY/VASCULAR | Age: 81
DRG: 004 | End: 2021-01-01
Attending: INTERNAL MEDICINE
Payer: MEDICARE

## 2021-01-01 ENCOUNTER — HOSPITAL ENCOUNTER (INPATIENT)
Age: 81
LOS: 5 days | Discharge: NURSING, REHAB OR LONG-TERM CARE | DRG: 154 | End: 2021-06-29
Attending: STUDENT IN AN ORGANIZED HEALTH CARE EDUCATION/TRAINING PROGRAM | Admitting: INTERNAL MEDICINE
Payer: MEDICARE

## 2021-01-01 ENCOUNTER — APPOINTMENT (OUTPATIENT)
Dept: INFUSION THERAPY | Age: 81
End: 2021-01-01

## 2021-01-01 ENCOUNTER — HOSPITAL ENCOUNTER (INPATIENT)
Age: 81
LOS: 9 days | DRG: 870 | End: 2021-07-15
Attending: EMERGENCY MEDICINE | Admitting: INTERNAL MEDICINE
Payer: MEDICARE

## 2021-01-01 ENCOUNTER — APPOINTMENT (OUTPATIENT)
Dept: ULTRASOUND IMAGING | Age: 81
DRG: 004 | End: 2021-01-01
Attending: INTERNAL MEDICINE
Payer: MEDICARE

## 2021-01-01 ENCOUNTER — APPOINTMENT (OUTPATIENT)
Dept: CT IMAGING | Age: 81
DRG: 870 | End: 2021-01-01
Attending: INTERNAL MEDICINE
Payer: MEDICARE

## 2021-01-01 ENCOUNTER — HOSPITAL ENCOUNTER (INPATIENT)
Age: 81
LOS: 9 days | Discharge: NURSING, REHAB OR LONG-TERM CARE | DRG: 641 | End: 2021-06-08
Attending: EMERGENCY MEDICINE | Admitting: INTERNAL MEDICINE
Payer: MEDICARE

## 2021-01-01 ENCOUNTER — APPOINTMENT (OUTPATIENT)
Dept: GENERAL RADIOLOGY | Age: 81
DRG: 870 | End: 2021-01-01
Attending: EMERGENCY MEDICINE
Payer: MEDICARE

## 2021-01-01 ENCOUNTER — HOSPITAL ENCOUNTER (INPATIENT)
Age: 81
LOS: 7 days | Discharge: NURSING, REHAB OR LONG-TERM CARE | DRG: 291 | End: 2021-05-12
Attending: EMERGENCY MEDICINE | Admitting: INTERNAL MEDICINE
Payer: MEDICARE

## 2021-01-01 ENCOUNTER — APPOINTMENT (OUTPATIENT)
Dept: CT IMAGING | Age: 81
DRG: 291 | End: 2021-01-01
Attending: EMERGENCY MEDICINE
Payer: MEDICARE

## 2021-01-01 ENCOUNTER — HOSPITAL ENCOUNTER (OUTPATIENT)
Dept: LAB | Age: 81
Discharge: HOME | End: 2021-06-11

## 2021-01-01 ENCOUNTER — APPOINTMENT (OUTPATIENT)
Dept: ULTRASOUND IMAGING | Age: 81
DRG: 291 | End: 2021-01-01
Attending: FAMILY MEDICINE
Payer: MEDICARE

## 2021-01-01 ENCOUNTER — APPOINTMENT (OUTPATIENT)
Dept: MRI IMAGING | Age: 81
DRG: 004 | End: 2021-01-01
Attending: PSYCHIATRY & NEUROLOGY
Payer: MEDICARE

## 2021-01-01 ENCOUNTER — APPOINTMENT (OUTPATIENT)
Dept: GENERAL RADIOLOGY | Age: 81
DRG: 004 | End: 2021-01-01
Payer: MEDICARE

## 2021-01-01 ENCOUNTER — APPOINTMENT (OUTPATIENT)
Dept: CT IMAGING | Age: 81
DRG: 291 | End: 2021-01-01
Attending: HOSPITALIST
Payer: MEDICARE

## 2021-01-01 ENCOUNTER — ANESTHESIA EVENT (OUTPATIENT)
Dept: ENDOSCOPY | Age: 81
DRG: 291 | End: 2021-01-01
Payer: MEDICARE

## 2021-01-01 VITALS
TEMPERATURE: 98.2 F | DIASTOLIC BLOOD PRESSURE: 68 MMHG | BODY MASS INDEX: 25.27 KG/M2 | HEIGHT: 65 IN | WEIGHT: 151.7 LBS | RESPIRATION RATE: 14 BRPM | OXYGEN SATURATION: 97 % | HEART RATE: 68 BPM | SYSTOLIC BLOOD PRESSURE: 134 MMHG

## 2021-01-01 VITALS
WEIGHT: 140.2 LBS | BODY MASS INDEX: 23.36 KG/M2 | HEART RATE: 82 BPM | OXYGEN SATURATION: 89 % | RESPIRATION RATE: 20 BRPM | SYSTOLIC BLOOD PRESSURE: 109 MMHG | DIASTOLIC BLOOD PRESSURE: 59 MMHG | HEIGHT: 65 IN | TEMPERATURE: 97.9 F

## 2021-01-01 VITALS
RESPIRATION RATE: 18 BRPM | SYSTOLIC BLOOD PRESSURE: 176 MMHG | HEIGHT: 65 IN | WEIGHT: 150 LBS | BODY MASS INDEX: 24.99 KG/M2 | DIASTOLIC BLOOD PRESSURE: 82 MMHG | HEART RATE: 63 BPM | TEMPERATURE: 97.5 F | OXYGEN SATURATION: 100 %

## 2021-01-01 VITALS
DIASTOLIC BLOOD PRESSURE: 82 MMHG | BODY MASS INDEX: 25.08 KG/M2 | HEART RATE: 62 BPM | HEIGHT: 65 IN | SYSTOLIC BLOOD PRESSURE: 165 MMHG | RESPIRATION RATE: 18 BRPM | OXYGEN SATURATION: 93 % | TEMPERATURE: 97.6 F | WEIGHT: 150.5 LBS

## 2021-01-01 VITALS
HEIGHT: 65 IN | RESPIRATION RATE: 20 BRPM | OXYGEN SATURATION: 100 % | WEIGHT: 144.1 LBS | SYSTOLIC BLOOD PRESSURE: 162 MMHG | HEART RATE: 61 BPM | TEMPERATURE: 98 F | DIASTOLIC BLOOD PRESSURE: 76 MMHG | BODY MASS INDEX: 24.01 KG/M2

## 2021-01-01 DIAGNOSIS — I50.9 CONGESTIVE HEART FAILURE, UNSPECIFIED HF CHRONICITY, UNSPECIFIED HEART FAILURE TYPE (HCC): ICD-10-CM

## 2021-01-01 DIAGNOSIS — I50.31 ACUTE DIASTOLIC CHF (CONGESTIVE HEART FAILURE) (HCC): ICD-10-CM

## 2021-01-01 DIAGNOSIS — N30.00 ACUTE CYSTITIS WITHOUT HEMATURIA: ICD-10-CM

## 2021-01-01 DIAGNOSIS — J96.01 ACUTE HYPOXEMIC RESPIRATORY FAILURE (HCC): ICD-10-CM

## 2021-01-01 DIAGNOSIS — A41.9 SEVERE SEPSIS (HCC): ICD-10-CM

## 2021-01-01 DIAGNOSIS — U07.1 COVID-19: ICD-10-CM

## 2021-01-01 DIAGNOSIS — N17.9 ACUTE KIDNEY INJURY (HCC): Primary | ICD-10-CM

## 2021-01-01 DIAGNOSIS — G25.3 MYOCLONUS: Chronic | ICD-10-CM

## 2021-01-01 DIAGNOSIS — N17.9 ACUTE RENAL FAILURE SUPERIMPOSED ON CHRONIC KIDNEY DISEASE, UNSPECIFIED CKD STAGE, UNSPECIFIED ACUTE RENAL FAILURE TYPE (HCC): ICD-10-CM

## 2021-01-01 DIAGNOSIS — I46.9 CARDIAC ARREST (HCC): ICD-10-CM

## 2021-01-01 DIAGNOSIS — E11.65 UNCONTROLLED TYPE 2 DIABETES MELLITUS WITH HYPERGLYCEMIA (HCC): Chronic | ICD-10-CM

## 2021-01-01 DIAGNOSIS — R41.89 UNRESPONSIVE STATE: ICD-10-CM

## 2021-01-01 DIAGNOSIS — R06.1 EXPIRATORY STRIDOR: Primary | ICD-10-CM

## 2021-01-01 DIAGNOSIS — N18.9 ACUTE RENAL FAILURE SUPERIMPOSED ON CHRONIC KIDNEY DISEASE, UNSPECIFIED CKD STAGE, UNSPECIFIED ACUTE RENAL FAILURE TYPE (HCC): ICD-10-CM

## 2021-01-01 DIAGNOSIS — J80 ACUTE RESPIRATORY DISTRESS SYNDROME (ARDS) DUE TO SEVERE ACUTE RESPIRATORY SYNDROME CORONAVIRUS 2 (SARS-COV-2) (HCC): ICD-10-CM

## 2021-01-01 DIAGNOSIS — R45.1 AGITATION: ICD-10-CM

## 2021-01-01 DIAGNOSIS — E87.5 ACUTE HYPERKALEMIA: Primary | ICD-10-CM

## 2021-01-01 DIAGNOSIS — J30.89 OTHER ALLERGIC RHINITIS: Chronic | ICD-10-CM

## 2021-01-01 DIAGNOSIS — J69.0 ASPIRATION PNEUMONIA OF BOTH LOWER LOBES, UNSPECIFIED ASPIRATION PNEUMONIA TYPE (HCC): ICD-10-CM

## 2021-01-01 DIAGNOSIS — I50.23 ACUTE ON CHRONIC SYSTOLIC CONGESTIVE HEART FAILURE (HCC): ICD-10-CM

## 2021-01-01 DIAGNOSIS — U07.1 ACUTE RESPIRATORY DISTRESS SYNDROME (ARDS) DUE TO SEVERE ACUTE RESPIRATORY SYNDROME CORONAVIRUS 2 (SARS-COV-2) (HCC): ICD-10-CM

## 2021-01-01 DIAGNOSIS — J96.02 ACUTE HYPERCAPNIC RESPIRATORY FAILURE (HCC): ICD-10-CM

## 2021-01-01 DIAGNOSIS — M54.50 CHRONIC LOW BACK PAIN WITHOUT SCIATICA, UNSPECIFIED BACK PAIN LATERALITY: ICD-10-CM

## 2021-01-01 DIAGNOSIS — R06.1 STRIDOR: ICD-10-CM

## 2021-01-01 DIAGNOSIS — I50.9 ACUTE ON CHRONIC CONGESTIVE HEART FAILURE, UNSPECIFIED HEART FAILURE TYPE (HCC): ICD-10-CM

## 2021-01-01 DIAGNOSIS — J96.11 CHRONIC HYPOXEMIC RESPIRATORY FAILURE (HCC): ICD-10-CM

## 2021-01-01 DIAGNOSIS — E87.5 ACUTE HYPERKALEMIA: ICD-10-CM

## 2021-01-01 DIAGNOSIS — R65.20 SEVERE SEPSIS (HCC): ICD-10-CM

## 2021-01-01 DIAGNOSIS — T68.XXXA HYPOTHERMIA, INITIAL ENCOUNTER: ICD-10-CM

## 2021-01-01 DIAGNOSIS — J90 BILATERAL PLEURAL EFFUSION: ICD-10-CM

## 2021-01-01 DIAGNOSIS — G89.29 CHRONIC LOW BACK PAIN WITHOUT SCIATICA, UNSPECIFIED BACK PAIN LATERALITY: ICD-10-CM

## 2021-01-01 DIAGNOSIS — G47.33 OSA (OBSTRUCTIVE SLEEP APNEA): Chronic | ICD-10-CM

## 2021-01-01 DIAGNOSIS — N39.0 URINARY TRACT INFECTION WITHOUT HEMATURIA, SITE UNSPECIFIED: ICD-10-CM

## 2021-01-01 DIAGNOSIS — J96.01 ACUTE RESPIRATORY FAILURE WITH HYPOXIA (HCC): Primary | ICD-10-CM

## 2021-01-01 DIAGNOSIS — J38.00 VOCAL CORD PARESIS: ICD-10-CM

## 2021-01-01 DIAGNOSIS — R56.9 SEIZURE (HCC): ICD-10-CM

## 2021-01-01 DIAGNOSIS — N18.30 STAGE 3 CHRONIC KIDNEY DISEASE, UNSPECIFIED WHETHER STAGE 3A OR 3B CKD (HCC): Chronic | ICD-10-CM

## 2021-01-01 DIAGNOSIS — N30.00 ACUTE CYSTITIS WITHOUT HEMATURIA: Primary | ICD-10-CM

## 2021-01-01 DIAGNOSIS — R41.89 UNRESPONSIVENESS: ICD-10-CM

## 2021-01-01 DIAGNOSIS — I27.20 PULMONARY HTN (HCC): ICD-10-CM

## 2021-01-01 DIAGNOSIS — K21.9 GASTROESOPHAGEAL REFLUX DISEASE WITHOUT ESOPHAGITIS: Chronic | ICD-10-CM

## 2021-01-01 DIAGNOSIS — R06.03 RESPIRATORY DISTRESS: ICD-10-CM

## 2021-01-01 DIAGNOSIS — I25.810 CORONARY ARTERY DISEASE INVOLVING CORONARY BYPASS GRAFT OF NATIVE HEART WITHOUT ANGINA PECTORIS: Chronic | ICD-10-CM

## 2021-01-01 DIAGNOSIS — Z86.16 HISTORY OF COVID-19: Chronic | ICD-10-CM

## 2021-01-01 DIAGNOSIS — G47.33 OSA (OBSTRUCTIVE SLEEP APNEA): ICD-10-CM

## 2021-01-01 DIAGNOSIS — G40.909 SEIZURE CEREBRAL (HCC): ICD-10-CM

## 2021-01-01 DIAGNOSIS — J38.02 BILATERAL VOCAL CORD PARALYSIS: ICD-10-CM

## 2021-01-01 DIAGNOSIS — A41.9 SEPSIS, DUE TO UNSPECIFIED ORGANISM, UNSPECIFIED WHETHER ACUTE ORGAN DYSFUNCTION PRESENT (HCC): ICD-10-CM

## 2021-01-01 DIAGNOSIS — N18.9 CHRONIC RENAL IMPAIRMENT, UNSPECIFIED CKD STAGE: ICD-10-CM

## 2021-01-01 DIAGNOSIS — R79.89 ABNORMAL LFTS: ICD-10-CM

## 2021-01-01 DIAGNOSIS — J81.0 ACUTE PULMONARY EDEMA (HCC): ICD-10-CM

## 2021-01-01 LAB
ABO + RH BLD: NORMAL
ALBUMIN SERPL-MCNC: 2.5 G/DL (ref 3.2–4.6)
ALBUMIN SERPL-MCNC: 2.6 G/DL (ref 3.2–4.6)
ALBUMIN SERPL-MCNC: 2.7 G/DL (ref 3.2–4.6)
ALBUMIN SERPL-MCNC: 2.8 G/DL (ref 3.2–4.6)
ALBUMIN SERPL-MCNC: 2.9 G/DL (ref 3.2–4.6)
ALBUMIN SERPL-MCNC: 3 G/DL (ref 3.2–4.6)
ALBUMIN SERPL-MCNC: 3.1 G/DL (ref 3.2–4.6)
ALBUMIN SERPL-MCNC: 3.2 G/DL (ref 3.2–4.6)
ALBUMIN SERPL-MCNC: 3.3 G/DL (ref 3.2–4.6)
ALBUMIN SERPL-MCNC: 3.4 G/DL (ref 3.2–4.6)
ALBUMIN/GLOB SERPL: 0.5 {RATIO} (ref 1.2–3.5)
ALBUMIN/GLOB SERPL: 0.5 {RATIO} (ref 1.2–3.5)
ALBUMIN/GLOB SERPL: 0.6 {RATIO} (ref 1.2–3.5)
ALBUMIN/GLOB SERPL: 0.7 {RATIO} (ref 1.2–3.5)
ALBUMIN/GLOB SERPL: 0.8 {RATIO} (ref 1.2–3.5)
ALBUMIN/GLOB SERPL: 0.9 {RATIO} (ref 1.2–3.5)
ALBUMIN/GLOB SERPL: 1 {RATIO} (ref 1.2–3.5)
ALP SERPL-CCNC: 107 U/L (ref 50–136)
ALP SERPL-CCNC: 108 U/L (ref 50–136)
ALP SERPL-CCNC: 108 U/L (ref 50–136)
ALP SERPL-CCNC: 114 U/L (ref 50–136)
ALP SERPL-CCNC: 114 U/L (ref 50–136)
ALP SERPL-CCNC: 115 U/L (ref 50–136)
ALP SERPL-CCNC: 116 U/L (ref 50–136)
ALP SERPL-CCNC: 131 U/L (ref 50–136)
ALP SERPL-CCNC: 135 U/L (ref 50–136)
ALP SERPL-CCNC: 136 U/L (ref 50–136)
ALP SERPL-CCNC: 138 U/L (ref 50–136)
ALP SERPL-CCNC: 146 U/L (ref 50–136)
ALP SERPL-CCNC: 148 U/L (ref 50–136)
ALP SERPL-CCNC: 149 U/L (ref 50–136)
ALP SERPL-CCNC: 156 U/L (ref 50–136)
ALP SERPL-CCNC: 157 U/L (ref 50–136)
ALP SERPL-CCNC: 162 U/L (ref 50–136)
ALP SERPL-CCNC: 164 U/L (ref 50–136)
ALP SERPL-CCNC: 165 U/L (ref 50–136)
ALP SERPL-CCNC: 167 U/L (ref 50–136)
ALP SERPL-CCNC: 168 U/L (ref 50–136)
ALP SERPL-CCNC: 431 U/L (ref 50–136)
ALP SERPL-CCNC: 487 U/L (ref 50–136)
ALT SERPL-CCNC: 113 U/L (ref 12–65)
ALT SERPL-CCNC: 116 U/L (ref 12–65)
ALT SERPL-CCNC: 1202 U/L (ref 12–65)
ALT SERPL-CCNC: 126 U/L (ref 12–65)
ALT SERPL-CCNC: 141 U/L (ref 12–65)
ALT SERPL-CCNC: 190 U/L (ref 12–65)
ALT SERPL-CCNC: 206 U/L (ref 12–65)
ALT SERPL-CCNC: 24 U/L (ref 12–65)
ALT SERPL-CCNC: 27 U/L (ref 12–65)
ALT SERPL-CCNC: 28 U/L (ref 12–65)
ALT SERPL-CCNC: 29 U/L (ref 12–65)
ALT SERPL-CCNC: 31 U/L (ref 12–65)
ALT SERPL-CCNC: 31 U/L (ref 12–65)
ALT SERPL-CCNC: 32 U/L (ref 12–65)
ALT SERPL-CCNC: 32 U/L (ref 12–65)
ALT SERPL-CCNC: 36 U/L (ref 12–65)
ALT SERPL-CCNC: 39 U/L (ref 12–65)
ALT SERPL-CCNC: 39 U/L (ref 12–65)
ALT SERPL-CCNC: 43 U/L (ref 12–65)
ALT SERPL-CCNC: 62 U/L (ref 12–65)
ALT SERPL-CCNC: 90 U/L (ref 12–65)
ALT SERPL-CCNC: 99 U/L (ref 12–65)
ALT SERPL-CCNC: 991 U/L (ref 12–65)
AMMONIA PLAS-SCNC: 19 UMOL/L (ref 11–32)
AMMONIA PLAS-SCNC: 20 UMOL/L (ref 11–32)
ANION GAP SERPL CALC-SCNC: 0 MMOL/L (ref 7–16)
ANION GAP SERPL CALC-SCNC: 1 MMOL/L (ref 7–16)
ANION GAP SERPL CALC-SCNC: 10 MMOL/L (ref 7–16)
ANION GAP SERPL CALC-SCNC: 2 MMOL/L (ref 7–16)
ANION GAP SERPL CALC-SCNC: 3 MMOL/L (ref 7–16)
ANION GAP SERPL CALC-SCNC: 4 MMOL/L (ref 7–16)
ANION GAP SERPL CALC-SCNC: 5 MMOL/L (ref 7–16)
ANION GAP SERPL CALC-SCNC: 6 MMOL/L (ref 7–16)
ANION GAP SERPL CALC-SCNC: 7 MMOL/L (ref 7–16)
ANION GAP SERPL CALC-SCNC: 8 MMOL/L (ref 7–16)
ANION GAP SERPL CALC-SCNC: 9 MMOL/L (ref 7–16)
ANION GAP SERPL CALC-SCNC: ABNORMAL MMOL/L (ref 7–16)
APPEARANCE UR: ABNORMAL
APPEARANCE UR: CLEAR
APTT PPP: 25.4 SEC (ref 24.1–35.1)
APTT PPP: 29.7 SEC (ref 24.1–35.1)
APTT PPP: 53.9 SEC (ref 24.1–35.1)
ARTERIAL PATENCY WRIST A: ABNORMAL
ARTERIAL PATENCY WRIST A: POSITIVE
ARTERIAL PATENCY WRIST A: YES
AST SERPL-CCNC: 14 U/L (ref 15–37)
AST SERPL-CCNC: 17 U/L (ref 15–37)
AST SERPL-CCNC: 17 U/L (ref 15–37)
AST SERPL-CCNC: 18 U/L (ref 15–37)
AST SERPL-CCNC: 19 U/L (ref 15–37)
AST SERPL-CCNC: 2022 U/L (ref 15–37)
AST SERPL-CCNC: 24 U/L (ref 15–37)
AST SERPL-CCNC: 26 U/L (ref 15–37)
AST SERPL-CCNC: 26 U/L (ref 15–37)
AST SERPL-CCNC: 28 U/L (ref 15–37)
AST SERPL-CCNC: 30 U/L (ref 15–37)
AST SERPL-CCNC: 36 U/L (ref 15–37)
AST SERPL-CCNC: 38 U/L (ref 15–37)
AST SERPL-CCNC: 38 U/L (ref 15–37)
AST SERPL-CCNC: 42 U/L (ref 15–37)
AST SERPL-CCNC: 60 U/L (ref 15–37)
AST SERPL-CCNC: 66 U/L (ref 15–37)
AST SERPL-CCNC: 71 U/L (ref 15–37)
AST SERPL-CCNC: 832 U/L (ref 15–37)
AST SERPL-CCNC: 84 U/L (ref 15–37)
ATRIAL RATE: 45 BPM
ATRIAL RATE: 50 BPM
ATRIAL RATE: 52 BPM
ATRIAL RATE: 63 BPM
ATRIAL RATE: 64 BPM
ATRIAL RATE: 68 BPM
BACTERIA SPEC CULT: ABNORMAL
BACTERIA SPEC CULT: NORMAL
BACTERIA URNS QL MICRO: 0 /HPF
BACTERIA URNS QL MICRO: ABNORMAL /HPF
BACTERIA URNS QL MICRO: NORMAL /HPF
BASE DEFICIT BLD-SCNC: 0.2 MMOL/L
BASE DEFICIT BLD-SCNC: 0.8 MMOL/L
BASE DEFICIT BLD-SCNC: 1.4 MMOL/L
BASE DEFICIT BLD-SCNC: 1.7 MMOL/L
BASE DEFICIT BLD-SCNC: 2 MMOL/L
BASE DEFICIT BLD-SCNC: 2.5 MMOL/L
BASE DEFICIT BLD-SCNC: 2.6 MMOL/L
BASE DEFICIT BLD-SCNC: 2.8 MMOL/L
BASE DEFICIT BLD-SCNC: 3 MMOL/L
BASE DEFICIT BLD-SCNC: 3.3 MMOL/L
BASE DEFICIT BLD-SCNC: 3.4 MMOL/L
BASE DEFICIT BLD-SCNC: 4 MMOL/L
BASE DEFICIT BLD-SCNC: 4.1 MMOL/L
BASE DEFICIT BLD-SCNC: 4.5 MMOL/L
BASE DEFICIT BLD-SCNC: 4.6 MMOL/L
BASE DEFICIT BLD-SCNC: 5 MMOL/L
BASE DEFICIT BLD-SCNC: 6 MMOL/L
BASE DEFICIT BLD-SCNC: 6 MMOL/L
BASE DEFICIT BLD-SCNC: 7 MMOL/L
BASE DEFICIT BLD-SCNC: 8 MMOL/L
BASE DEFICIT BLD-SCNC: 9 MMOL/L
BASE DEFICIT BLDV-SCNC: 0.3 MMOL/L
BASE DEFICIT BLDV-SCNC: 5 MMOL/L
BASE DEFICIT BLDV-SCNC: 6 MMOL/L
BASE EXCESS BLD CALC-SCNC: 0.8 MMOL/L
BASE EXCESS BLD CALC-SCNC: 1 MMOL/L
BASE EXCESS BLD CALC-SCNC: 1 MMOL/L
BASE EXCESS BLD CALC-SCNC: 1.7 MMOL/L
BASE EXCESS BLD CALC-SCNC: 10 MMOL/L
BASE EXCESS BLD CALC-SCNC: 10 MMOL/L
BASE EXCESS BLD CALC-SCNC: 11 MMOL/L
BASE EXCESS BLD CALC-SCNC: 11 MMOL/L
BASE EXCESS BLD CALC-SCNC: 12 MMOL/L
BASE EXCESS BLD CALC-SCNC: 13 MMOL/L
BASE EXCESS BLD CALC-SCNC: 13 MMOL/L
BASE EXCESS BLD CALC-SCNC: 14 MMOL/L
BASE EXCESS BLD CALC-SCNC: 15 MMOL/L
BASE EXCESS BLD CALC-SCNC: 16 MMOL/L
BASE EXCESS BLD CALC-SCNC: 17 MMOL/L
BASE EXCESS BLD CALC-SCNC: 17 MMOL/L
BASE EXCESS BLD CALC-SCNC: 18 MMOL/L
BASE EXCESS BLD CALC-SCNC: 2 MMOL/L
BASE EXCESS BLD CALC-SCNC: 2 MMOL/L
BASE EXCESS BLD CALC-SCNC: 2.5 MMOL/L
BASE EXCESS BLD CALC-SCNC: 2.9 MMOL/L
BASE EXCESS BLD CALC-SCNC: 20 MMOL/L
BASE EXCESS BLD CALC-SCNC: 21 MMOL/L
BASE EXCESS BLD CALC-SCNC: 3 MMOL/L
BASE EXCESS BLD CALC-SCNC: 3.1 MMOL/L
BASE EXCESS BLD CALC-SCNC: 4 MMOL/L
BASE EXCESS BLD CALC-SCNC: 4 MMOL/L
BASE EXCESS BLD CALC-SCNC: 5 MMOL/L
BASE EXCESS BLD CALC-SCNC: 6 MMOL/L
BASE EXCESS BLD CALC-SCNC: 7 MMOL/L
BASE EXCESS BLD CALC-SCNC: 8 MMOL/L
BASE EXCESS BLD CALC-SCNC: 9 MMOL/L
BASOPHILS # BLD: 0 K/UL (ref 0–0.2)
BASOPHILS # BLD: 0.1 K/UL (ref 0–0.2)
BASOPHILS # BLD: 0.2 K/UL (ref 0–0.2)
BASOPHILS NFR BLD: 0 % (ref 0–2)
BASOPHILS NFR BLD: 1 % (ref 0–2)
BDY SITE: ABNORMAL
BILIRUB DIRECT SERPL-MCNC: 0.1 MG/DL
BILIRUB DIRECT SERPL-MCNC: 0.4 MG/DL
BILIRUB SERPL-MCNC: 0.1 MG/DL (ref 0.2–1.1)
BILIRUB SERPL-MCNC: 0.2 MG/DL (ref 0.2–1.1)
BILIRUB SERPL-MCNC: 0.3 MG/DL (ref 0.2–1.1)
BILIRUB SERPL-MCNC: 0.4 MG/DL (ref 0.2–1.1)
BILIRUB SERPL-MCNC: 0.7 MG/DL (ref 0.2–1.1)
BILIRUB SERPL-MCNC: 0.7 MG/DL (ref 0.2–1.1)
BILIRUB SERPL-MCNC: 1 MG/DL (ref 0.2–1.1)
BILIRUB UR QL: NEGATIVE
BLD PROD TYP BPU: NORMAL
BLOOD GROUP ANTIBODIES SERPL: NORMAL
BNP SERPL-MCNC: 2277 PG/ML
BNP SERPL-MCNC: 2470 PG/ML
BNP SERPL-MCNC: 2605 PG/ML
BNP SERPL-MCNC: 2639 PG/ML
BNP SERPL-MCNC: 3052 PG/ML
BNP SERPL-MCNC: 3096 PG/ML
BNP SERPL-MCNC: 8533 PG/ML
BNP SERPL-MCNC: ABNORMAL PG/ML
BODY TEMPERATURE: 99
BPU ID: NORMAL
BUN SERPL-MCNC: 100 MG/DL (ref 8–23)
BUN SERPL-MCNC: 103 MG/DL (ref 8–23)
BUN SERPL-MCNC: 106 MG/DL (ref 8–23)
BUN SERPL-MCNC: 109 MG/DL (ref 8–23)
BUN SERPL-MCNC: 112 MG/DL (ref 8–23)
BUN SERPL-MCNC: 115 MG/DL (ref 8–23)
BUN SERPL-MCNC: 115 MG/DL (ref 8–23)
BUN SERPL-MCNC: 116 MG/DL (ref 8–23)
BUN SERPL-MCNC: 117 MG/DL (ref 8–23)
BUN SERPL-MCNC: 120 MG/DL (ref 8–23)
BUN SERPL-MCNC: 121 MG/DL (ref 8–23)
BUN SERPL-MCNC: 121 MG/DL (ref 8–23)
BUN SERPL-MCNC: 122 MG/DL (ref 8–23)
BUN SERPL-MCNC: 123 MG/DL (ref 8–23)
BUN SERPL-MCNC: 123 MG/DL (ref 8–23)
BUN SERPL-MCNC: 124 MG/DL (ref 8–23)
BUN SERPL-MCNC: 126 MG/DL (ref 8–23)
BUN SERPL-MCNC: 144 MG/DL (ref 8–23)
BUN SERPL-MCNC: 159 MG/DL (ref 8–23)
BUN SERPL-MCNC: 22 MG/DL (ref 8–23)
BUN SERPL-MCNC: 29 MG/DL (ref 8–23)
BUN SERPL-MCNC: 30 MG/DL (ref 8–23)
BUN SERPL-MCNC: 31 MG/DL (ref 8–23)
BUN SERPL-MCNC: 32 MG/DL (ref 8–23)
BUN SERPL-MCNC: 33 MG/DL (ref 8–23)
BUN SERPL-MCNC: 34 MG/DL (ref 8–23)
BUN SERPL-MCNC: 35 MG/DL (ref 8–23)
BUN SERPL-MCNC: 36 MG/DL (ref 8–23)
BUN SERPL-MCNC: 38 MG/DL (ref 8–23)
BUN SERPL-MCNC: 39 MG/DL (ref 8–23)
BUN SERPL-MCNC: 40 MG/DL (ref 8–23)
BUN SERPL-MCNC: 41 MG/DL (ref 8–23)
BUN SERPL-MCNC: 42 MG/DL (ref 8–23)
BUN SERPL-MCNC: 42 MG/DL (ref 8–23)
BUN SERPL-MCNC: 43 MG/DL (ref 8–23)
BUN SERPL-MCNC: 43 MG/DL (ref 8–23)
BUN SERPL-MCNC: 45 MG/DL (ref 8–23)
BUN SERPL-MCNC: 46 MG/DL (ref 8–23)
BUN SERPL-MCNC: 46 MG/DL (ref 8–23)
BUN SERPL-MCNC: 47 MG/DL (ref 8–23)
BUN SERPL-MCNC: 47 MG/DL (ref 8–23)
BUN SERPL-MCNC: 48 MG/DL (ref 8–23)
BUN SERPL-MCNC: 48 MG/DL (ref 8–23)
BUN SERPL-MCNC: 49 MG/DL (ref 8–23)
BUN SERPL-MCNC: 50 MG/DL (ref 8–23)
BUN SERPL-MCNC: 50 MG/DL (ref 8–23)
BUN SERPL-MCNC: 51 MG/DL (ref 8–23)
BUN SERPL-MCNC: 52 MG/DL (ref 8–23)
BUN SERPL-MCNC: 53 MG/DL (ref 8–23)
BUN SERPL-MCNC: 57 MG/DL (ref 8–23)
BUN SERPL-MCNC: 57 MG/DL (ref 8–23)
BUN SERPL-MCNC: 58 MG/DL (ref 8–23)
BUN SERPL-MCNC: 58 MG/DL (ref 8–23)
BUN SERPL-MCNC: 59 MG/DL (ref 8–23)
BUN SERPL-MCNC: 62 MG/DL (ref 8–23)
BUN SERPL-MCNC: 63 MG/DL (ref 8–23)
BUN SERPL-MCNC: 63 MG/DL (ref 8–23)
BUN SERPL-MCNC: 64 MG/DL (ref 8–23)
BUN SERPL-MCNC: 65 MG/DL (ref 8–23)
BUN SERPL-MCNC: 67 MG/DL (ref 8–23)
BUN SERPL-MCNC: 67 MG/DL (ref 8–23)
BUN SERPL-MCNC: 68 MG/DL (ref 8–23)
BUN SERPL-MCNC: 69 MG/DL (ref 8–23)
BUN SERPL-MCNC: 69 MG/DL (ref 8–23)
BUN SERPL-MCNC: 70 MG/DL (ref 8–23)
BUN SERPL-MCNC: 71 MG/DL (ref 8–23)
BUN SERPL-MCNC: 71 MG/DL (ref 8–23)
BUN SERPL-MCNC: 72 MG/DL (ref 8–23)
BUN SERPL-MCNC: 73 MG/DL (ref 8–23)
BUN SERPL-MCNC: 74 MG/DL (ref 8–23)
BUN SERPL-MCNC: 75 MG/DL (ref 8–23)
BUN SERPL-MCNC: 75 MG/DL (ref 8–23)
BUN SERPL-MCNC: 77 MG/DL (ref 8–23)
BUN SERPL-MCNC: 79 MG/DL (ref 8–23)
BUN SERPL-MCNC: 81 MG/DL (ref 8–23)
BUN SERPL-MCNC: 81 MG/DL (ref 8–23)
BUN SERPL-MCNC: 82 MG/DL (ref 8–23)
BUN SERPL-MCNC: 84 MG/DL (ref 8–23)
BUN SERPL-MCNC: 84 MG/DL (ref 8–23)
BUN SERPL-MCNC: 86 MG/DL (ref 8–23)
BUN SERPL-MCNC: 89 MG/DL (ref 8–23)
BUN SERPL-MCNC: 90 MG/DL (ref 8–23)
BUN SERPL-MCNC: 91 MG/DL (ref 8–23)
BUN SERPL-MCNC: 92 MG/DL (ref 8–23)
BUN SERPL-MCNC: 94 MG/DL (ref 8–23)
BUN SERPL-MCNC: 97 MG/DL (ref 8–23)
CA-I BLD-MCNC: 1.2 MMOL/L (ref 1.12–1.32)
CALCIUM SERPL-MCNC: 10 MG/DL (ref 8.3–10.4)
CALCIUM SERPL-MCNC: 10 MG/DL (ref 8.3–10.4)
CALCIUM SERPL-MCNC: 10.1 MG/DL (ref 8.3–10.4)
CALCIUM SERPL-MCNC: 10.2 MG/DL (ref 8.3–10.4)
CALCIUM SERPL-MCNC: 10.2 MG/DL (ref 8.3–10.4)
CALCIUM SERPL-MCNC: 10.3 MG/DL (ref 8.3–10.4)
CALCIUM SERPL-MCNC: 10.3 MG/DL (ref 8.3–10.4)
CALCIUM SERPL-MCNC: 10.4 MG/DL (ref 8.3–10.4)
CALCIUM SERPL-MCNC: 10.7 MG/DL (ref 8.3–10.4)
CALCIUM SERPL-MCNC: 10.8 MG/DL (ref 8.3–10.4)
CALCIUM SERPL-MCNC: 7.1 MG/DL (ref 8.3–10.4)
CALCIUM SERPL-MCNC: 7.3 MG/DL (ref 8.3–10.4)
CALCIUM SERPL-MCNC: 7.6 MG/DL (ref 8.3–10.4)
CALCIUM SERPL-MCNC: 7.9 MG/DL (ref 8.3–10.4)
CALCIUM SERPL-MCNC: 8 MG/DL (ref 8.3–10.4)
CALCIUM SERPL-MCNC: 8 MG/DL (ref 8.3–10.4)
CALCIUM SERPL-MCNC: 8.1 MG/DL (ref 8.3–10.4)
CALCIUM SERPL-MCNC: 8.2 MG/DL (ref 8.3–10.4)
CALCIUM SERPL-MCNC: 8.3 MG/DL (ref 8.3–10.4)
CALCIUM SERPL-MCNC: 8.4 MG/DL (ref 8.3–10.4)
CALCIUM SERPL-MCNC: 8.5 MG/DL (ref 8.3–10.4)
CALCIUM SERPL-MCNC: 8.6 MG/DL (ref 8.3–10.4)
CALCIUM SERPL-MCNC: 8.7 MG/DL (ref 8.3–10.4)
CALCIUM SERPL-MCNC: 8.8 MG/DL (ref 8.3–10.4)
CALCIUM SERPL-MCNC: 8.9 MG/DL (ref 8.3–10.4)
CALCIUM SERPL-MCNC: 9 MG/DL (ref 8.3–10.4)
CALCIUM SERPL-MCNC: 9.1 MG/DL (ref 8.3–10.4)
CALCIUM SERPL-MCNC: 9.2 MG/DL (ref 8.3–10.4)
CALCIUM SERPL-MCNC: 9.3 MG/DL (ref 8.3–10.4)
CALCIUM SERPL-MCNC: 9.4 MG/DL (ref 8.3–10.4)
CALCIUM SERPL-MCNC: 9.5 MG/DL (ref 8.3–10.4)
CALCIUM SERPL-MCNC: 9.6 MG/DL (ref 8.3–10.4)
CALCIUM SERPL-MCNC: 9.7 MG/DL (ref 8.3–10.4)
CALCIUM SERPL-MCNC: 9.8 MG/DL (ref 8.3–10.4)
CALCIUM SERPL-MCNC: 9.8 MG/DL (ref 8.3–10.4)
CALCIUM SERPL-MCNC: 9.9 MG/DL (ref 8.3–10.4)
CALCULATED P AXIS, ECG09: 21 DEGREES
CALCULATED P AXIS, ECG09: 34 DEGREES
CALCULATED P AXIS, ECG09: 36 DEGREES
CALCULATED P AXIS, ECG09: 57 DEGREES
CALCULATED P AXIS, ECG09: 59 DEGREES
CALCULATED R AXIS, ECG10: 31 DEGREES
CALCULATED R AXIS, ECG10: 38 DEGREES
CALCULATED R AXIS, ECG10: 57 DEGREES
CALCULATED R AXIS, ECG10: 59 DEGREES
CALCULATED R AXIS, ECG10: 66 DEGREES
CALCULATED R AXIS, ECG10: 76 DEGREES
CALCULATED T AXIS, ECG11: -75 DEGREES
CALCULATED T AXIS, ECG11: 102 DEGREES
CALCULATED T AXIS, ECG11: 103 DEGREES
CALCULATED T AXIS, ECG11: 109 DEGREES
CALCULATED T AXIS, ECG11: 93 DEGREES
CALCULATED T AXIS, ECG11: 95 DEGREES
CASTS URNS QL MICRO: 0 /LPF
CASTS URNS QL MICRO: ABNORMAL /LPF
CASTS URNS QL MICRO: NORMAL /LPF
CHLORIDE SERPL-SCNC: 100 MMOL/L (ref 98–107)
CHLORIDE SERPL-SCNC: 101 MMOL/L (ref 98–107)
CHLORIDE SERPL-SCNC: 102 MMOL/L (ref 98–107)
CHLORIDE SERPL-SCNC: 103 MMOL/L (ref 98–107)
CHLORIDE SERPL-SCNC: 104 MMOL/L (ref 98–107)
CHLORIDE SERPL-SCNC: 105 MMOL/L (ref 98–107)
CHLORIDE SERPL-SCNC: 106 MMOL/L (ref 98–107)
CHLORIDE SERPL-SCNC: 107 MMOL/L (ref 98–107)
CHLORIDE SERPL-SCNC: 108 MMOL/L (ref 98–107)
CHLORIDE SERPL-SCNC: 109 MMOL/L (ref 98–107)
CHLORIDE SERPL-SCNC: 110 MMOL/L (ref 98–107)
CHLORIDE SERPL-SCNC: 111 MMOL/L (ref 98–107)
CHLORIDE SERPL-SCNC: 112 MMOL/L (ref 98–107)
CHLORIDE SERPL-SCNC: 113 MMOL/L (ref 98–107)
CHLORIDE SERPL-SCNC: 114 MMOL/L (ref 98–107)
CHLORIDE SERPL-SCNC: 115 MMOL/L (ref 98–107)
CHLORIDE SERPL-SCNC: 116 MMOL/L (ref 98–107)
CHLORIDE SERPL-SCNC: 117 MMOL/L (ref 98–107)
CHLORIDE SERPL-SCNC: 120 MMOL/L (ref 98–107)
CHLORIDE SERPL-SCNC: 95 MMOL/L (ref 98–107)
CHLORIDE SERPL-SCNC: 97 MMOL/L (ref 98–107)
CHLORIDE SERPL-SCNC: 97 MMOL/L (ref 98–107)
CHLORIDE SERPL-SCNC: 98 MMOL/L (ref 98–107)
CHLORIDE SERPL-SCNC: 99 MMOL/L (ref 98–107)
CHLORIDE SERPL-SCNC: 99 MMOL/L (ref 98–107)
CK SERPL-CCNC: 26 U/L (ref 21–215)
CO2 BLD-SCNC: 20 MMOL/L
CO2 BLD-SCNC: 22 MMOL/L
CO2 BLD-SCNC: 23 MMOL/L
CO2 BLD-SCNC: 25 MMOL/L
CO2 BLD-SCNC: 27 MMOL/L
CO2 BLD-SCNC: 27 MMOL/L (ref 13–23)
CO2 BLD-SCNC: 29 MMOL/L
CO2 BLD-SCNC: 29 MMOL/L (ref 13–23)
CO2 BLD-SCNC: 30 MMOL/L
CO2 BLD-SCNC: 31 MMOL/L
CO2 BLD-SCNC: 32 MMOL/L
CO2 BLD-SCNC: 32 MMOL/L
CO2 BLD-SCNC: 33 MMOL/L
CO2 BLD-SCNC: 35 MMOL/L
CO2 BLD-SCNC: 36 MMOL/L
CO2 BLD-SCNC: 37 MMOL/L
CO2 BLD-SCNC: 38 MMOL/L
CO2 BLD-SCNC: 39 MMOL/L
CO2 BLD-SCNC: 40 MMOL/L
CO2 BLD-SCNC: 40 MMOL/L
CO2 BLD-SCNC: 41 MMOL/L
CO2 BLD-SCNC: 42 MMOL/L
CO2 BLD-SCNC: 43 MMOL/L
CO2 BLD-SCNC: 44 MMOL/L
CO2 BLD-SCNC: 45 MMOL/L
CO2 BLD-SCNC: 45 MMOL/L
CO2 BLD-SCNC: 47 MMOL/L
CO2 BLD-SCNC: 49 MMOL/L
CO2 BLD-SCNC: 49 MMOL/L
CO2 BLD-SCNC: 50 MMOL/L
CO2 SERPL-SCNC: 20 MMOL/L (ref 21–32)
CO2 SERPL-SCNC: 21 MMOL/L (ref 21–32)
CO2 SERPL-SCNC: 22 MMOL/L (ref 21–32)
CO2 SERPL-SCNC: 23 MMOL/L (ref 21–32)
CO2 SERPL-SCNC: 24 MMOL/L (ref 21–32)
CO2 SERPL-SCNC: 25 MMOL/L (ref 21–32)
CO2 SERPL-SCNC: 26 MMOL/L (ref 21–32)
CO2 SERPL-SCNC: 26 MMOL/L (ref 21–32)
CO2 SERPL-SCNC: 27 MMOL/L (ref 21–32)
CO2 SERPL-SCNC: 28 MMOL/L (ref 21–32)
CO2 SERPL-SCNC: 29 MMOL/L (ref 21–32)
CO2 SERPL-SCNC: 30 MMOL/L (ref 21–32)
CO2 SERPL-SCNC: 31 MMOL/L (ref 21–32)
CO2 SERPL-SCNC: 32 MMOL/L (ref 21–32)
CO2 SERPL-SCNC: 33 MMOL/L (ref 21–32)
CO2 SERPL-SCNC: 34 MMOL/L (ref 21–32)
CO2 SERPL-SCNC: 35 MMOL/L (ref 21–32)
CO2 SERPL-SCNC: 36 MMOL/L (ref 21–32)
CO2 SERPL-SCNC: 37 MMOL/L (ref 21–32)
CO2 SERPL-SCNC: 37 MMOL/L (ref 21–32)
CO2 SERPL-SCNC: 38 MMOL/L (ref 21–32)
CO2 SERPL-SCNC: 39 MMOL/L (ref 21–32)
CO2 SERPL-SCNC: 39 MMOL/L (ref 21–32)
CO2 SERPL-SCNC: 40 MMOL/L (ref 21–32)
CO2 SERPL-SCNC: 41 MMOL/L (ref 21–32)
CO2 SERPL-SCNC: 42 MMOL/L (ref 21–32)
CO2 SERPL-SCNC: 43 MMOL/L (ref 21–32)
CO2 SERPL-SCNC: 43 MMOL/L (ref 21–32)
CO2 SERPL-SCNC: 45 MMOL/L (ref 21–32)
CO2 SERPL-SCNC: ABNORMAL MMOL/L (ref 21–32)
COLLECT TIME,HTIME: 1105
COLLECT TIME,HTIME: 1130
COLLECT TIME,HTIME: 1208
COLLECT TIME,HTIME: 1311
COLLECT TIME,HTIME: 1430
COLLECT TIME,HTIME: 1445
COLLECT TIME,HTIME: 148
COLLECT TIME,HTIME: 155
COLLECT TIME,HTIME: 2025
COLLECT TIME,HTIME: 205
COLLECT TIME,HTIME: 205
COLLECT TIME,HTIME: 210
COLLECT TIME,HTIME: 210
COLLECT TIME,HTIME: 2140
COLLECT TIME,HTIME: 220
COLLECT TIME,HTIME: 230
COLLECT TIME,HTIME: 24
COLLECT TIME,HTIME: 255
COLLECT TIME,HTIME: 300
COLLECT TIME,HTIME: 300
COLLECT TIME,HTIME: 303
COLLECT TIME,HTIME: 310
COLLECT TIME,HTIME: 310
COLLECT TIME,HTIME: 316
COLLECT TIME,HTIME: 320
COLLECT TIME,HTIME: 320
COLLECT TIME,HTIME: 325
COLLECT TIME,HTIME: 325
COLLECT TIME,HTIME: 330
COLLECT TIME,HTIME: 333
COLLECT TIME,HTIME: 335
COLLECT TIME,HTIME: 343
COLLECT TIME,HTIME: 344
COLLECT TIME,HTIME: 345
COLLECT TIME,HTIME: 350
COLLECT TIME,HTIME: 352
COLLECT TIME,HTIME: 352
COLLECT TIME,HTIME: 355
COLLECT TIME,HTIME: 355
COLLECT TIME,HTIME: 358
COLLECT TIME,HTIME: 400
COLLECT TIME,HTIME: 405
COLLECT TIME,HTIME: 410
COLLECT TIME,HTIME: 415
COLLECT TIME,HTIME: 415
COLLECT TIME,HTIME: 430
COLLECT TIME,HTIME: 435
COLLECT TIME,HTIME: 440
COLLECT TIME,HTIME: 445
COLLECT TIME,HTIME: 530
COLLECT TIME,HTIME: 600
COLLECT TIME,HTIME: 8448
COLLECT TIME,HTIME: 857
COLOR UR: YELLOW
COVID-19 RAPID TEST, COVR: NOT DETECTED
CREAT SERPL-MCNC: 1.07 MG/DL (ref 0.6–1)
CREAT SERPL-MCNC: 1.08 MG/DL (ref 0.6–1)
CREAT SERPL-MCNC: 1.1 MG/DL (ref 0.6–1)
CREAT SERPL-MCNC: 1.13 MG/DL (ref 0.6–1)
CREAT SERPL-MCNC: 1.18 MG/DL (ref 0.6–1)
CREAT SERPL-MCNC: 1.18 MG/DL (ref 0.6–1)
CREAT SERPL-MCNC: 1.19 MG/DL (ref 0.6–1)
CREAT SERPL-MCNC: 1.19 MG/DL (ref 0.6–1)
CREAT SERPL-MCNC: 1.21 MG/DL (ref 0.6–1)
CREAT SERPL-MCNC: 1.22 MG/DL (ref 0.6–1)
CREAT SERPL-MCNC: 1.27 MG/DL (ref 0.6–1)
CREAT SERPL-MCNC: 1.28 MG/DL (ref 0.6–1)
CREAT SERPL-MCNC: 1.31 MG/DL (ref 0.6–1)
CREAT SERPL-MCNC: 1.31 MG/DL (ref 0.6–1)
CREAT SERPL-MCNC: 1.32 MG/DL (ref 0.6–1)
CREAT SERPL-MCNC: 1.33 MG/DL (ref 0.6–1)
CREAT SERPL-MCNC: 1.33 MG/DL (ref 0.6–1)
CREAT SERPL-MCNC: 1.39 MG/DL (ref 0.6–1)
CREAT SERPL-MCNC: 1.41 MG/DL (ref 0.6–1)
CREAT SERPL-MCNC: 1.41 MG/DL (ref 0.6–1)
CREAT SERPL-MCNC: 1.42 MG/DL (ref 0.6–1)
CREAT SERPL-MCNC: 1.43 MG/DL (ref 0.6–1)
CREAT SERPL-MCNC: 1.43 MG/DL (ref 0.6–1)
CREAT SERPL-MCNC: 1.46 MG/DL (ref 0.6–1)
CREAT SERPL-MCNC: 1.46 MG/DL (ref 0.6–1)
CREAT SERPL-MCNC: 1.48 MG/DL (ref 0.6–1)
CREAT SERPL-MCNC: 1.49 MG/DL (ref 0.6–1)
CREAT SERPL-MCNC: 1.49 MG/DL (ref 0.6–1)
CREAT SERPL-MCNC: 1.53 MG/DL (ref 0.6–1)
CREAT SERPL-MCNC: 1.54 MG/DL (ref 0.6–1)
CREAT SERPL-MCNC: 1.54 MG/DL (ref 0.6–1)
CREAT SERPL-MCNC: 1.56 MG/DL (ref 0.6–1)
CREAT SERPL-MCNC: 1.57 MG/DL (ref 0.6–1)
CREAT SERPL-MCNC: 1.58 MG/DL (ref 0.6–1)
CREAT SERPL-MCNC: 1.58 MG/DL (ref 0.6–1)
CREAT SERPL-MCNC: 1.59 MG/DL (ref 0.6–1)
CREAT SERPL-MCNC: 1.6 MG/DL (ref 0.6–1)
CREAT SERPL-MCNC: 1.61 MG/DL (ref 0.6–1)
CREAT SERPL-MCNC: 1.62 MG/DL (ref 0.6–1)
CREAT SERPL-MCNC: 1.62 MG/DL (ref 0.6–1)
CREAT SERPL-MCNC: 1.63 MG/DL (ref 0.6–1)
CREAT SERPL-MCNC: 1.64 MG/DL (ref 0.6–1)
CREAT SERPL-MCNC: 1.65 MG/DL (ref 0.6–1)
CREAT SERPL-MCNC: 1.7 MG/DL (ref 0.6–1)
CREAT SERPL-MCNC: 1.71 MG/DL (ref 0.6–1)
CREAT SERPL-MCNC: 1.71 MG/DL (ref 0.6–1)
CREAT SERPL-MCNC: 1.72 MG/DL (ref 0.6–1)
CREAT SERPL-MCNC: 1.73 MG/DL (ref 0.6–1)
CREAT SERPL-MCNC: 1.73 MG/DL (ref 0.6–1)
CREAT SERPL-MCNC: 1.75 MG/DL (ref 0.6–1)
CREAT SERPL-MCNC: 1.76 MG/DL (ref 0.6–1)
CREAT SERPL-MCNC: 1.77 MG/DL (ref 0.6–1)
CREAT SERPL-MCNC: 1.78 MG/DL (ref 0.6–1)
CREAT SERPL-MCNC: 1.81 MG/DL (ref 0.6–1)
CREAT SERPL-MCNC: 1.81 MG/DL (ref 0.6–1)
CREAT SERPL-MCNC: 1.82 MG/DL (ref 0.6–1)
CREAT SERPL-MCNC: 1.83 MG/DL (ref 0.6–1)
CREAT SERPL-MCNC: 1.84 MG/DL (ref 0.6–1)
CREAT SERPL-MCNC: 1.85 MG/DL (ref 0.6–1)
CREAT SERPL-MCNC: 1.86 MG/DL (ref 0.6–1)
CREAT SERPL-MCNC: 1.87 MG/DL (ref 0.6–1)
CREAT SERPL-MCNC: 1.87 MG/DL (ref 0.6–1)
CREAT SERPL-MCNC: 1.91 MG/DL (ref 0.6–1)
CREAT SERPL-MCNC: 1.92 MG/DL (ref 0.6–1)
CREAT SERPL-MCNC: 1.92 MG/DL (ref 0.6–1)
CREAT SERPL-MCNC: 1.94 MG/DL (ref 0.6–1)
CREAT SERPL-MCNC: 1.95 MG/DL (ref 0.6–1)
CREAT SERPL-MCNC: 1.96 MG/DL (ref 0.6–1)
CREAT SERPL-MCNC: 1.97 MG/DL (ref 0.6–1)
CREAT SERPL-MCNC: 2.02 MG/DL (ref 0.6–1)
CREAT SERPL-MCNC: 2.02 MG/DL (ref 0.6–1)
CREAT SERPL-MCNC: 2.07 MG/DL (ref 0.6–1)
CREAT SERPL-MCNC: 2.1 MG/DL (ref 0.6–1)
CREAT SERPL-MCNC: 2.12 MG/DL (ref 0.6–1)
CREAT SERPL-MCNC: 2.13 MG/DL (ref 0.6–1)
CREAT SERPL-MCNC: 2.15 MG/DL (ref 0.6–1)
CREAT SERPL-MCNC: 2.17 MG/DL (ref 0.6–1)
CREAT SERPL-MCNC: 2.19 MG/DL (ref 0.6–1)
CREAT SERPL-MCNC: 2.2 MG/DL (ref 0.6–1)
CREAT SERPL-MCNC: 2.26 MG/DL (ref 0.6–1)
CREAT SERPL-MCNC: 2.29 MG/DL (ref 0.6–1)
CREAT SERPL-MCNC: 2.37 MG/DL (ref 0.6–1)
CREAT SERPL-MCNC: 2.38 MG/DL (ref 0.6–1)
CREAT SERPL-MCNC: 2.38 MG/DL (ref 0.6–1)
CREAT SERPL-MCNC: 2.42 MG/DL (ref 0.6–1)
CREAT SERPL-MCNC: 2.43 MG/DL (ref 0.6–1)
CREAT SERPL-MCNC: 2.43 MG/DL (ref 0.6–1)
CREAT SERPL-MCNC: 2.45 MG/DL (ref 0.6–1)
CREAT SERPL-MCNC: 2.53 MG/DL (ref 0.6–1)
CREAT SERPL-MCNC: 2.54 MG/DL (ref 0.6–1)
CREAT SERPL-MCNC: 2.65 MG/DL (ref 0.6–1)
CREAT SERPL-MCNC: 2.75 MG/DL (ref 0.6–1)
CREAT SERPL-MCNC: 2.89 MG/DL (ref 0.6–1)
CREAT SERPL-MCNC: 2.96 MG/DL (ref 0.6–1)
CREAT SERPL-MCNC: 2.99 MG/DL (ref 0.6–1)
CREAT SERPL-MCNC: 3.25 MG/DL (ref 0.6–1)
CREAT SERPL-MCNC: 3.29 MG/DL (ref 0.6–1)
CREAT SERPL-MCNC: 3.3 MG/DL (ref 0.6–1)
CREAT SERPL-MCNC: 3.31 MG/DL (ref 0.6–1)
CREAT SERPL-MCNC: 3.59 MG/DL (ref 0.6–1)
CREAT SERPL-MCNC: 3.98 MG/DL (ref 0.6–1)
CREAT SERPL-MCNC: 4.38 MG/DL (ref 0.6–1)
CREAT SERPL-MCNC: 4.78 MG/DL (ref 0.6–1)
CREAT UR-MCNC: 89.3 MG/DL
CROSSMATCH RESULT,%XM: NORMAL
CRYSTALS URNS QL MICRO: 0 /LPF
D DIMER PPP FEU-MCNC: 2.54 UG/ML(FEU)
D DIMER PPP FEU-MCNC: 7.07 UG/ML(FEU)
DIAGNOSIS, 93000: NORMAL
DIFFERENTIAL METHOD BLD: ABNORMAL
DIFFERENTIAL METHOD BLD: NORMAL
EOSINOPHIL # BLD: 0 K/UL (ref 0–0.8)
EOSINOPHIL # BLD: 0.1 K/UL (ref 0–0.8)
EOSINOPHIL # BLD: 0.2 K/UL (ref 0–0.8)
EOSINOPHIL # BLD: 0.3 K/UL (ref 0–0.8)
EOSINOPHIL # BLD: 0.4 K/UL (ref 0–0.8)
EOSINOPHIL # BLD: 0.5 K/UL (ref 0–0.8)
EOSINOPHIL # BLD: 0.6 K/UL (ref 0–0.8)
EOSINOPHIL # BLD: 0.6 K/UL (ref 0–0.8)
EOSINOPHIL # BLD: 0.7 K/UL (ref 0–0.8)
EOSINOPHIL # BLD: 0.8 K/UL (ref 0–0.8)
EOSINOPHIL # BLD: 0.8 K/UL (ref 0–0.8)
EOSINOPHIL # BLD: 1 K/UL (ref 0–0.8)
EOSINOPHIL NFR BLD: 0 % (ref 0.5–7.8)
EOSINOPHIL NFR BLD: 1 % (ref 0.5–7.8)
EOSINOPHIL NFR BLD: 2 % (ref 0.5–7.8)
EOSINOPHIL NFR BLD: 3 % (ref 0.5–7.8)
EOSINOPHIL NFR BLD: 4 % (ref 0.5–7.8)
EOSINOPHIL NFR BLD: 5 % (ref 0.5–7.8)
EOSINOPHIL NFR BLD: 6 % (ref 0.5–7.8)
EOSINOPHIL NFR BLD: 6 % (ref 0.5–7.8)
EOSINOPHIL NFR BLD: 7 % (ref 0.5–7.8)
EPI CELLS #/AREA URNS HPF: 0 /HPF
EPI CELLS #/AREA URNS HPF: ABNORMAL /HPF
EPI CELLS #/AREA URNS HPF: NORMAL /HPF
ERYTHROCYTE [DISTWIDTH] IN BLOOD BY AUTOMATED COUNT: 14.1 % (ref 11.9–14.6)
ERYTHROCYTE [DISTWIDTH] IN BLOOD BY AUTOMATED COUNT: 14.1 % (ref 11.9–14.6)
ERYTHROCYTE [DISTWIDTH] IN BLOOD BY AUTOMATED COUNT: 14.3 % (ref 11.9–14.6)
ERYTHROCYTE [DISTWIDTH] IN BLOOD BY AUTOMATED COUNT: 14.4 % (ref 11.9–14.6)
ERYTHROCYTE [DISTWIDTH] IN BLOOD BY AUTOMATED COUNT: 14.5 % (ref 11.9–14.6)
ERYTHROCYTE [DISTWIDTH] IN BLOOD BY AUTOMATED COUNT: 14.6 % (ref 11.9–14.6)
ERYTHROCYTE [DISTWIDTH] IN BLOOD BY AUTOMATED COUNT: 14.7 % (ref 11.9–14.6)
ERYTHROCYTE [DISTWIDTH] IN BLOOD BY AUTOMATED COUNT: 14.8 % (ref 11.9–14.6)
ERYTHROCYTE [DISTWIDTH] IN BLOOD BY AUTOMATED COUNT: 14.9 % (ref 11.9–14.6)
ERYTHROCYTE [DISTWIDTH] IN BLOOD BY AUTOMATED COUNT: 15 % (ref 11.9–14.6)
ERYTHROCYTE [DISTWIDTH] IN BLOOD BY AUTOMATED COUNT: 15.1 % (ref 11.9–14.6)
ERYTHROCYTE [DISTWIDTH] IN BLOOD BY AUTOMATED COUNT: 15.2 % (ref 11.9–14.6)
ERYTHROCYTE [DISTWIDTH] IN BLOOD BY AUTOMATED COUNT: 15.3 % (ref 11.9–14.6)
ERYTHROCYTE [DISTWIDTH] IN BLOOD BY AUTOMATED COUNT: 15.4 % (ref 11.9–14.6)
ERYTHROCYTE [DISTWIDTH] IN BLOOD BY AUTOMATED COUNT: 15.5 % (ref 11.9–14.6)
ERYTHROCYTE [DISTWIDTH] IN BLOOD BY AUTOMATED COUNT: 15.6 % (ref 11.9–14.6)
ERYTHROCYTE [DISTWIDTH] IN BLOOD BY AUTOMATED COUNT: 15.7 % (ref 11.9–14.6)
ERYTHROCYTE [DISTWIDTH] IN BLOOD BY AUTOMATED COUNT: 15.8 % (ref 11.9–14.6)
ERYTHROCYTE [DISTWIDTH] IN BLOOD BY AUTOMATED COUNT: 15.9 % (ref 11.9–14.6)
ERYTHROCYTE [DISTWIDTH] IN BLOOD BY AUTOMATED COUNT: 16.1 % (ref 11.9–14.6)
ERYTHROCYTE [DISTWIDTH] IN BLOOD BY AUTOMATED COUNT: 16.1 % (ref 11.9–14.6)
ERYTHROCYTE [DISTWIDTH] IN BLOOD BY AUTOMATED COUNT: 16.2 % (ref 11.9–14.6)
ERYTHROCYTE [DISTWIDTH] IN BLOOD BY AUTOMATED COUNT: 16.2 % (ref 11.9–14.6)
ERYTHROCYTE [DISTWIDTH] IN BLOOD BY AUTOMATED COUNT: 16.3 % (ref 11.9–14.6)
ERYTHROCYTE [DISTWIDTH] IN BLOOD BY AUTOMATED COUNT: 16.4 % (ref 11.9–14.6)
ERYTHROCYTE [DISTWIDTH] IN BLOOD BY AUTOMATED COUNT: 16.5 % (ref 11.9–14.6)
ERYTHROCYTE [DISTWIDTH] IN BLOOD BY AUTOMATED COUNT: 16.6 % (ref 11.9–14.6)
ERYTHROCYTE [DISTWIDTH] IN BLOOD BY AUTOMATED COUNT: 16.6 % (ref 11.9–14.6)
ERYTHROCYTE [DISTWIDTH] IN BLOOD BY AUTOMATED COUNT: 16.7 % (ref 11.9–14.6)
ERYTHROCYTE [DISTWIDTH] IN BLOOD BY AUTOMATED COUNT: 16.7 % (ref 11.9–14.6)
ERYTHROCYTE [DISTWIDTH] IN BLOOD BY AUTOMATED COUNT: 18.6 % (ref 11.9–14.6)
ERYTHROCYTE [DISTWIDTH] IN BLOOD BY AUTOMATED COUNT: 18.8 % (ref 11.9–14.6)
ERYTHROCYTE [DISTWIDTH] IN BLOOD BY AUTOMATED COUNT: 19.8 % (ref 11.9–14.6)
ERYTHROCYTE [DISTWIDTH] IN BLOOD BY AUTOMATED COUNT: 19.9 % (ref 11.9–14.6)
ERYTHROCYTE [DISTWIDTH] IN BLOOD BY AUTOMATED COUNT: 20 % (ref 11.9–14.6)
ERYTHROCYTE [DISTWIDTH] IN BLOOD BY AUTOMATED COUNT: NORMAL %
EST. AVERAGE GLUCOSE BLD GHB EST-MCNC: 126 MG/DL
EST. AVERAGE GLUCOSE BLD GHB EST-MCNC: 137 MG/DL
EXHALED MINUTE VOLUME, VE: 10.5 L/MIN
EXHALED MINUTE VOLUME, VE: 10.7 L/MIN
EXHALED MINUTE VOLUME, VE: 11 L/MIN
EXHALED MINUTE VOLUME, VE: 11.1 L/MIN
EXHALED MINUTE VOLUME, VE: 11.1 L/MIN
EXHALED MINUTE VOLUME, VE: 11.4 L/MIN
EXHALED MINUTE VOLUME, VE: 11.5 L/MIN
EXHALED MINUTE VOLUME, VE: 11.5 L/MIN
EXHALED MINUTE VOLUME, VE: 11.6 L/MIN
EXHALED MINUTE VOLUME, VE: 12 L/MIN
EXHALED MINUTE VOLUME, VE: 12.1 L/MIN
EXHALED MINUTE VOLUME, VE: 12.1 L/MIN
EXHALED MINUTE VOLUME, VE: 12.2 L/MIN
EXHALED MINUTE VOLUME, VE: 12.3 L/MIN
EXHALED MINUTE VOLUME, VE: 12.4 L/MIN
EXHALED MINUTE VOLUME, VE: 12.4 L/MIN
EXHALED MINUTE VOLUME, VE: 12.5 L/MIN
EXHALED MINUTE VOLUME, VE: 12.6 L/MIN
EXHALED MINUTE VOLUME, VE: 12.7 L/MIN
EXHALED MINUTE VOLUME, VE: 12.8 L/MIN
EXHALED MINUTE VOLUME, VE: 12.8 L/MIN
EXHALED MINUTE VOLUME, VE: 13 L/MIN
EXHALED MINUTE VOLUME, VE: 13 L/MIN
EXHALED MINUTE VOLUME, VE: 13.1 L/MIN
EXHALED MINUTE VOLUME, VE: 13.2 L/MIN
EXHALED MINUTE VOLUME, VE: 13.4 L/MIN
EXHALED MINUTE VOLUME, VE: 13.5 L/MIN
EXHALED MINUTE VOLUME, VE: 13.6 L/MIN
EXHALED MINUTE VOLUME, VE: 13.7 L/MIN
EXHALED MINUTE VOLUME, VE: 13.9 L/MIN
EXHALED MINUTE VOLUME, VE: 14 L/MIN
EXHALED MINUTE VOLUME, VE: 14.2 L/MIN
EXHALED MINUTE VOLUME, VE: 14.6 L/MIN
EXHALED MINUTE VOLUME, VE: 14.7 L/MIN
EXHALED MINUTE VOLUME, VE: 15 L/MIN
EXHALED MINUTE VOLUME, VE: 15.4 L/MIN
EXHALED MINUTE VOLUME, VE: 15.6 L/MIN
EXHALED MINUTE VOLUME, VE: 16 L/MIN
EXHALED MINUTE VOLUME, VE: 16 L/MIN
EXHALED MINUTE VOLUME, VE: 7.9 L/MIN
EXHALED MINUTE VOLUME, VE: 8.13 L/MIN
EXHALED MINUTE VOLUME, VE: 8.3 L/MIN
EXHALED MINUTE VOLUME, VE: 8.4 L/MIN
EXHALED MINUTE VOLUME, VE: 8.66 L/MIN
EXHALED MINUTE VOLUME, VE: 8.89 L/MIN
EXHALED MINUTE VOLUME, VE: 9.05 L/MIN
EXHALED MINUTE VOLUME, VE: 9.16 L/MIN
EXHALED MINUTE VOLUME, VE: 9.4 L/MIN
EXHALED MINUTE VOLUME, VE: 9.45 L/MIN
EXHALED MINUTE VOLUME, VE: 9.6 L/MIN
EXHALED MINUTE VOLUME, VE: 9.7 L/MIN
EXHALED MINUTE VOLUME, VE: 9.8 L/MIN
EXHALED MINUTE VOLUME, VE: 9.8 L/MIN
EXHALED MINUTE VOLUME, VE: 9.9 L/MIN
FERRITIN SERPL-MCNC: 665 NG/ML (ref 8–388)
FIO2 ON VENT: 100 %
FIO2, L/MIN - FIO2P: 2
FIO2, L/MIN - FIO2P: 2
FIO2, L/MIN - FIO2P: 3
FIO2, L/MIN - FIO2P: 4
FLOW RATE ISTAT,IFRATE: 12 L/MIN
FLUID CULTURE, SPNG2: NORMAL
FOLATE SERPL-MCNC: 26.8 NG/ML (ref 3.1–17.5)
GAS FLOW.O2 O2 DELIVERY SYS: ABNORMAL L/MIN
GAS FLOW.O2 SETTING OXYMISER: 14 BPM
GAS FLOW.O2 SETTING OXYMISER: 15 BPM
GAS FLOW.O2 SETTING OXYMISER: 18 BPM
GAS FLOW.O2 SETTING OXYMISER: 20 BPM
GAS FLOW.O2 SETTING OXYMISER: 24 BPM
GAS FLOW.O2 SETTING OXYMISER: 25 BPM
GAS FLOW.O2 SETTING OXYMISER: 25 BPM
GAS FLOW.O2 SETTING OXYMISER: 28 BPM
GAS FLOW.O2 SETTING OXYMISER: 30 BPM
GAS FLOW.O2 SETTING OXYMISER: 32 BPM
GAS FLOW.O2 SETTING OXYMISER: 34 BPM
GAS FLOW.O2 SETTING OXYMISER: 36 BPM
GLOBULIN SER CALC-MCNC: 2.8 G/DL (ref 2.3–3.5)
GLOBULIN SER CALC-MCNC: 2.9 G/DL (ref 2.3–3.5)
GLOBULIN SER CALC-MCNC: 3 G/DL (ref 2.3–3.5)
GLOBULIN SER CALC-MCNC: 3.6 G/DL (ref 2.3–3.5)
GLOBULIN SER CALC-MCNC: 3.7 G/DL (ref 2.3–3.5)
GLOBULIN SER CALC-MCNC: 3.8 G/DL (ref 2.3–3.5)
GLOBULIN SER CALC-MCNC: 3.8 G/DL (ref 2.3–3.5)
GLOBULIN SER CALC-MCNC: 3.9 G/DL (ref 2.3–3.5)
GLOBULIN SER CALC-MCNC: 4 G/DL (ref 2.3–3.5)
GLOBULIN SER CALC-MCNC: 4 G/DL (ref 2.3–3.5)
GLOBULIN SER CALC-MCNC: 4.1 G/DL (ref 2.3–3.5)
GLOBULIN SER CALC-MCNC: 4.1 G/DL (ref 2.3–3.5)
GLOBULIN SER CALC-MCNC: 4.2 G/DL (ref 2.3–3.5)
GLOBULIN SER CALC-MCNC: 4.3 G/DL (ref 2.3–3.5)
GLOBULIN SER CALC-MCNC: 4.6 G/DL (ref 2.3–3.5)
GLOBULIN SER CALC-MCNC: 4.7 G/DL (ref 2.3–3.5)
GLUCOSE BLD STRIP.AUTO-MCNC: 100 MG/DL (ref 65–100)
GLUCOSE BLD STRIP.AUTO-MCNC: 102 MG/DL (ref 65–100)
GLUCOSE BLD STRIP.AUTO-MCNC: 103 MG/DL (ref 65–100)
GLUCOSE BLD STRIP.AUTO-MCNC: 104 MG/DL (ref 65–100)
GLUCOSE BLD STRIP.AUTO-MCNC: 105 MG/DL (ref 65–100)
GLUCOSE BLD STRIP.AUTO-MCNC: 106 MG/DL (ref 65–100)
GLUCOSE BLD STRIP.AUTO-MCNC: 106 MG/DL (ref 65–100)
GLUCOSE BLD STRIP.AUTO-MCNC: 107 MG/DL (ref 65–100)
GLUCOSE BLD STRIP.AUTO-MCNC: 108 MG/DL (ref 65–100)
GLUCOSE BLD STRIP.AUTO-MCNC: 109 MG/DL (ref 65–100)
GLUCOSE BLD STRIP.AUTO-MCNC: 110 MG/DL (ref 65–100)
GLUCOSE BLD STRIP.AUTO-MCNC: 110 MG/DL (ref 65–100)
GLUCOSE BLD STRIP.AUTO-MCNC: 111 MG/DL (ref 65–100)
GLUCOSE BLD STRIP.AUTO-MCNC: 112 MG/DL (ref 65–100)
GLUCOSE BLD STRIP.AUTO-MCNC: 115 MG/DL (ref 65–100)
GLUCOSE BLD STRIP.AUTO-MCNC: 116 MG/DL (ref 65–100)
GLUCOSE BLD STRIP.AUTO-MCNC: 116 MG/DL (ref 65–100)
GLUCOSE BLD STRIP.AUTO-MCNC: 117 MG/DL (ref 65–100)
GLUCOSE BLD STRIP.AUTO-MCNC: 118 MG/DL (ref 65–100)
GLUCOSE BLD STRIP.AUTO-MCNC: 118 MG/DL (ref 65–100)
GLUCOSE BLD STRIP.AUTO-MCNC: 120 MG/DL (ref 65–100)
GLUCOSE BLD STRIP.AUTO-MCNC: 121 MG/DL (ref 65–100)
GLUCOSE BLD STRIP.AUTO-MCNC: 122 MG/DL (ref 65–100)
GLUCOSE BLD STRIP.AUTO-MCNC: 123 MG/DL (ref 65–100)
GLUCOSE BLD STRIP.AUTO-MCNC: 124 MG/DL (ref 65–100)
GLUCOSE BLD STRIP.AUTO-MCNC: 124 MG/DL (ref 65–100)
GLUCOSE BLD STRIP.AUTO-MCNC: 125 MG/DL (ref 65–100)
GLUCOSE BLD STRIP.AUTO-MCNC: 126 MG/DL (ref 65–100)
GLUCOSE BLD STRIP.AUTO-MCNC: 127 MG/DL (ref 65–100)
GLUCOSE BLD STRIP.AUTO-MCNC: 128 MG/DL (ref 65–100)
GLUCOSE BLD STRIP.AUTO-MCNC: 129 MG/DL (ref 65–100)
GLUCOSE BLD STRIP.AUTO-MCNC: 131 MG/DL (ref 65–100)
GLUCOSE BLD STRIP.AUTO-MCNC: 133 MG/DL (ref 65–100)
GLUCOSE BLD STRIP.AUTO-MCNC: 133 MG/DL (ref 65–100)
GLUCOSE BLD STRIP.AUTO-MCNC: 134 MG/DL (ref 65–100)
GLUCOSE BLD STRIP.AUTO-MCNC: 135 MG/DL (ref 65–100)
GLUCOSE BLD STRIP.AUTO-MCNC: 135 MG/DL (ref 65–100)
GLUCOSE BLD STRIP.AUTO-MCNC: 136 MG/DL (ref 65–100)
GLUCOSE BLD STRIP.AUTO-MCNC: 137 MG/DL (ref 65–100)
GLUCOSE BLD STRIP.AUTO-MCNC: 138 MG/DL (ref 65–100)
GLUCOSE BLD STRIP.AUTO-MCNC: 138 MG/DL (ref 65–100)
GLUCOSE BLD STRIP.AUTO-MCNC: 140 MG/DL (ref 65–100)
GLUCOSE BLD STRIP.AUTO-MCNC: 141 MG/DL (ref 65–100)
GLUCOSE BLD STRIP.AUTO-MCNC: 143 MG/DL (ref 65–100)
GLUCOSE BLD STRIP.AUTO-MCNC: 144 MG/DL (ref 65–100)
GLUCOSE BLD STRIP.AUTO-MCNC: 144 MG/DL (ref 65–100)
GLUCOSE BLD STRIP.AUTO-MCNC: 145 MG/DL (ref 65–100)
GLUCOSE BLD STRIP.AUTO-MCNC: 145 MG/DL (ref 65–100)
GLUCOSE BLD STRIP.AUTO-MCNC: 146 MG/DL (ref 65–100)
GLUCOSE BLD STRIP.AUTO-MCNC: 148 MG/DL (ref 65–100)
GLUCOSE BLD STRIP.AUTO-MCNC: 148 MG/DL (ref 65–100)
GLUCOSE BLD STRIP.AUTO-MCNC: 150 MG/DL (ref 65–100)
GLUCOSE BLD STRIP.AUTO-MCNC: 151 MG/DL (ref 65–100)
GLUCOSE BLD STRIP.AUTO-MCNC: 151 MG/DL (ref 65–100)
GLUCOSE BLD STRIP.AUTO-MCNC: 152 MG/DL (ref 65–100)
GLUCOSE BLD STRIP.AUTO-MCNC: 152 MG/DL (ref 65–100)
GLUCOSE BLD STRIP.AUTO-MCNC: 154 MG/DL (ref 65–100)
GLUCOSE BLD STRIP.AUTO-MCNC: 155 MG/DL (ref 65–100)
GLUCOSE BLD STRIP.AUTO-MCNC: 155 MG/DL (ref 65–100)
GLUCOSE BLD STRIP.AUTO-MCNC: 156 MG/DL (ref 65–100)
GLUCOSE BLD STRIP.AUTO-MCNC: 157 MG/DL (ref 65–100)
GLUCOSE BLD STRIP.AUTO-MCNC: 158 MG/DL (ref 65–100)
GLUCOSE BLD STRIP.AUTO-MCNC: 158 MG/DL (ref 65–100)
GLUCOSE BLD STRIP.AUTO-MCNC: 159 MG/DL (ref 65–100)
GLUCOSE BLD STRIP.AUTO-MCNC: 160 MG/DL (ref 65–100)
GLUCOSE BLD STRIP.AUTO-MCNC: 160 MG/DL (ref 65–100)
GLUCOSE BLD STRIP.AUTO-MCNC: 161 MG/DL (ref 65–100)
GLUCOSE BLD STRIP.AUTO-MCNC: 162 MG/DL (ref 65–100)
GLUCOSE BLD STRIP.AUTO-MCNC: 163 MG/DL (ref 65–100)
GLUCOSE BLD STRIP.AUTO-MCNC: 163 MG/DL (ref 65–100)
GLUCOSE BLD STRIP.AUTO-MCNC: 164 MG/DL (ref 65–100)
GLUCOSE BLD STRIP.AUTO-MCNC: 165 MG/DL (ref 65–100)
GLUCOSE BLD STRIP.AUTO-MCNC: 166 MG/DL (ref 65–100)
GLUCOSE BLD STRIP.AUTO-MCNC: 166 MG/DL (ref 65–100)
GLUCOSE BLD STRIP.AUTO-MCNC: 167 MG/DL (ref 65–100)
GLUCOSE BLD STRIP.AUTO-MCNC: 168 MG/DL (ref 65–100)
GLUCOSE BLD STRIP.AUTO-MCNC: 168 MG/DL (ref 65–100)
GLUCOSE BLD STRIP.AUTO-MCNC: 169 MG/DL (ref 65–100)
GLUCOSE BLD STRIP.AUTO-MCNC: 170 MG/DL (ref 65–100)
GLUCOSE BLD STRIP.AUTO-MCNC: 170 MG/DL (ref 65–100)
GLUCOSE BLD STRIP.AUTO-MCNC: 172 MG/DL (ref 65–100)
GLUCOSE BLD STRIP.AUTO-MCNC: 173 MG/DL (ref 65–100)
GLUCOSE BLD STRIP.AUTO-MCNC: 174 MG/DL (ref 65–100)
GLUCOSE BLD STRIP.AUTO-MCNC: 175 MG/DL (ref 65–100)
GLUCOSE BLD STRIP.AUTO-MCNC: 175 MG/DL (ref 65–100)
GLUCOSE BLD STRIP.AUTO-MCNC: 176 MG/DL (ref 65–100)
GLUCOSE BLD STRIP.AUTO-MCNC: 177 MG/DL (ref 65–100)
GLUCOSE BLD STRIP.AUTO-MCNC: 178 MG/DL (ref 65–100)
GLUCOSE BLD STRIP.AUTO-MCNC: 179 MG/DL (ref 65–100)
GLUCOSE BLD STRIP.AUTO-MCNC: 179 MG/DL (ref 65–100)
GLUCOSE BLD STRIP.AUTO-MCNC: 180 MG/DL (ref 65–100)
GLUCOSE BLD STRIP.AUTO-MCNC: 181 MG/DL (ref 65–100)
GLUCOSE BLD STRIP.AUTO-MCNC: 182 MG/DL (ref 65–100)
GLUCOSE BLD STRIP.AUTO-MCNC: 182 MG/DL (ref 65–100)
GLUCOSE BLD STRIP.AUTO-MCNC: 183 MG/DL (ref 65–100)
GLUCOSE BLD STRIP.AUTO-MCNC: 183 MG/DL (ref 65–100)
GLUCOSE BLD STRIP.AUTO-MCNC: 185 MG/DL (ref 65–100)
GLUCOSE BLD STRIP.AUTO-MCNC: 186 MG/DL (ref 65–100)
GLUCOSE BLD STRIP.AUTO-MCNC: 186 MG/DL (ref 65–100)
GLUCOSE BLD STRIP.AUTO-MCNC: 188 MG/DL (ref 65–100)
GLUCOSE BLD STRIP.AUTO-MCNC: 189 MG/DL (ref 65–100)
GLUCOSE BLD STRIP.AUTO-MCNC: 19 MG/DL (ref 65–100)
GLUCOSE BLD STRIP.AUTO-MCNC: 191 MG/DL (ref 65–100)
GLUCOSE BLD STRIP.AUTO-MCNC: 192 MG/DL (ref 65–100)
GLUCOSE BLD STRIP.AUTO-MCNC: 193 MG/DL (ref 65–100)
GLUCOSE BLD STRIP.AUTO-MCNC: 194 MG/DL (ref 65–100)
GLUCOSE BLD STRIP.AUTO-MCNC: 194 MG/DL (ref 65–100)
GLUCOSE BLD STRIP.AUTO-MCNC: 195 MG/DL (ref 65–100)
GLUCOSE BLD STRIP.AUTO-MCNC: 195 MG/DL (ref 65–100)
GLUCOSE BLD STRIP.AUTO-MCNC: 196 MG/DL (ref 65–100)
GLUCOSE BLD STRIP.AUTO-MCNC: 198 MG/DL (ref 65–100)
GLUCOSE BLD STRIP.AUTO-MCNC: 199 MG/DL (ref 65–100)
GLUCOSE BLD STRIP.AUTO-MCNC: 199 MG/DL (ref 65–100)
GLUCOSE BLD STRIP.AUTO-MCNC: 200 MG/DL (ref 65–100)
GLUCOSE BLD STRIP.AUTO-MCNC: 201 MG/DL (ref 65–100)
GLUCOSE BLD STRIP.AUTO-MCNC: 202 MG/DL (ref 65–100)
GLUCOSE BLD STRIP.AUTO-MCNC: 204 MG/DL (ref 65–100)
GLUCOSE BLD STRIP.AUTO-MCNC: 205 MG/DL (ref 65–100)
GLUCOSE BLD STRIP.AUTO-MCNC: 206 MG/DL (ref 65–100)
GLUCOSE BLD STRIP.AUTO-MCNC: 207 MG/DL (ref 65–100)
GLUCOSE BLD STRIP.AUTO-MCNC: 208 MG/DL (ref 65–100)
GLUCOSE BLD STRIP.AUTO-MCNC: 209 MG/DL (ref 65–100)
GLUCOSE BLD STRIP.AUTO-MCNC: 209 MG/DL (ref 65–100)
GLUCOSE BLD STRIP.AUTO-MCNC: 210 MG/DL (ref 65–100)
GLUCOSE BLD STRIP.AUTO-MCNC: 211 MG/DL (ref 65–100)
GLUCOSE BLD STRIP.AUTO-MCNC: 211 MG/DL (ref 65–100)
GLUCOSE BLD STRIP.AUTO-MCNC: 212 MG/DL (ref 65–100)
GLUCOSE BLD STRIP.AUTO-MCNC: 213 MG/DL (ref 65–100)
GLUCOSE BLD STRIP.AUTO-MCNC: 214 MG/DL (ref 65–100)
GLUCOSE BLD STRIP.AUTO-MCNC: 215 MG/DL (ref 65–100)
GLUCOSE BLD STRIP.AUTO-MCNC: 216 MG/DL (ref 65–100)
GLUCOSE BLD STRIP.AUTO-MCNC: 217 MG/DL (ref 65–100)
GLUCOSE BLD STRIP.AUTO-MCNC: 218 MG/DL (ref 65–100)
GLUCOSE BLD STRIP.AUTO-MCNC: 219 MG/DL (ref 65–100)
GLUCOSE BLD STRIP.AUTO-MCNC: 220 MG/DL (ref 65–100)
GLUCOSE BLD STRIP.AUTO-MCNC: 221 MG/DL (ref 65–100)
GLUCOSE BLD STRIP.AUTO-MCNC: 222 MG/DL (ref 65–100)
GLUCOSE BLD STRIP.AUTO-MCNC: 223 MG/DL (ref 65–100)
GLUCOSE BLD STRIP.AUTO-MCNC: 225 MG/DL (ref 65–100)
GLUCOSE BLD STRIP.AUTO-MCNC: 226 MG/DL (ref 65–100)
GLUCOSE BLD STRIP.AUTO-MCNC: 227 MG/DL (ref 65–100)
GLUCOSE BLD STRIP.AUTO-MCNC: 228 MG/DL (ref 65–100)
GLUCOSE BLD STRIP.AUTO-MCNC: 229 MG/DL (ref 65–100)
GLUCOSE BLD STRIP.AUTO-MCNC: 229 MG/DL (ref 65–100)
GLUCOSE BLD STRIP.AUTO-MCNC: 231 MG/DL (ref 65–100)
GLUCOSE BLD STRIP.AUTO-MCNC: 235 MG/DL (ref 65–100)
GLUCOSE BLD STRIP.AUTO-MCNC: 236 MG/DL (ref 65–100)
GLUCOSE BLD STRIP.AUTO-MCNC: 237 MG/DL (ref 65–100)
GLUCOSE BLD STRIP.AUTO-MCNC: 238 MG/DL (ref 65–100)
GLUCOSE BLD STRIP.AUTO-MCNC: 239 MG/DL (ref 65–100)
GLUCOSE BLD STRIP.AUTO-MCNC: 240 MG/DL (ref 65–100)
GLUCOSE BLD STRIP.AUTO-MCNC: 241 MG/DL (ref 65–100)
GLUCOSE BLD STRIP.AUTO-MCNC: 243 MG/DL (ref 65–100)
GLUCOSE BLD STRIP.AUTO-MCNC: 244 MG/DL (ref 65–100)
GLUCOSE BLD STRIP.AUTO-MCNC: 244 MG/DL (ref 65–100)
GLUCOSE BLD STRIP.AUTO-MCNC: 245 MG/DL (ref 65–100)
GLUCOSE BLD STRIP.AUTO-MCNC: 247 MG/DL (ref 65–100)
GLUCOSE BLD STRIP.AUTO-MCNC: 247 MG/DL (ref 65–100)
GLUCOSE BLD STRIP.AUTO-MCNC: 248 MG/DL (ref 65–100)
GLUCOSE BLD STRIP.AUTO-MCNC: 251 MG/DL (ref 65–100)
GLUCOSE BLD STRIP.AUTO-MCNC: 251 MG/DL (ref 65–100)
GLUCOSE BLD STRIP.AUTO-MCNC: 252 MG/DL (ref 65–100)
GLUCOSE BLD STRIP.AUTO-MCNC: 252 MG/DL (ref 65–100)
GLUCOSE BLD STRIP.AUTO-MCNC: 254 MG/DL (ref 65–100)
GLUCOSE BLD STRIP.AUTO-MCNC: 255 MG/DL (ref 65–100)
GLUCOSE BLD STRIP.AUTO-MCNC: 256 MG/DL (ref 65–100)
GLUCOSE BLD STRIP.AUTO-MCNC: 257 MG/DL (ref 65–100)
GLUCOSE BLD STRIP.AUTO-MCNC: 259 MG/DL (ref 65–100)
GLUCOSE BLD STRIP.AUTO-MCNC: 259 MG/DL (ref 65–100)
GLUCOSE BLD STRIP.AUTO-MCNC: 260 MG/DL (ref 65–100)
GLUCOSE BLD STRIP.AUTO-MCNC: 261 MG/DL (ref 65–100)
GLUCOSE BLD STRIP.AUTO-MCNC: 262 MG/DL (ref 65–100)
GLUCOSE BLD STRIP.AUTO-MCNC: 263 MG/DL (ref 65–100)
GLUCOSE BLD STRIP.AUTO-MCNC: 264 MG/DL (ref 65–100)
GLUCOSE BLD STRIP.AUTO-MCNC: 266 MG/DL (ref 65–100)
GLUCOSE BLD STRIP.AUTO-MCNC: 267 MG/DL (ref 65–100)
GLUCOSE BLD STRIP.AUTO-MCNC: 267 MG/DL (ref 65–100)
GLUCOSE BLD STRIP.AUTO-MCNC: 268 MG/DL (ref 65–100)
GLUCOSE BLD STRIP.AUTO-MCNC: 268 MG/DL (ref 65–100)
GLUCOSE BLD STRIP.AUTO-MCNC: 270 MG/DL (ref 65–100)
GLUCOSE BLD STRIP.AUTO-MCNC: 271 MG/DL (ref 65–100)
GLUCOSE BLD STRIP.AUTO-MCNC: 272 MG/DL (ref 65–100)
GLUCOSE BLD STRIP.AUTO-MCNC: 272 MG/DL (ref 65–100)
GLUCOSE BLD STRIP.AUTO-MCNC: 273 MG/DL (ref 65–100)
GLUCOSE BLD STRIP.AUTO-MCNC: 273 MG/DL (ref 65–100)
GLUCOSE BLD STRIP.AUTO-MCNC: 275 MG/DL (ref 65–100)
GLUCOSE BLD STRIP.AUTO-MCNC: 275 MG/DL (ref 65–100)
GLUCOSE BLD STRIP.AUTO-MCNC: 276 MG/DL (ref 65–100)
GLUCOSE BLD STRIP.AUTO-MCNC: 277 MG/DL (ref 65–100)
GLUCOSE BLD STRIP.AUTO-MCNC: 277 MG/DL (ref 65–100)
GLUCOSE BLD STRIP.AUTO-MCNC: 278 MG/DL (ref 65–100)
GLUCOSE BLD STRIP.AUTO-MCNC: 279 MG/DL (ref 65–100)
GLUCOSE BLD STRIP.AUTO-MCNC: 280 MG/DL (ref 65–100)
GLUCOSE BLD STRIP.AUTO-MCNC: 281 MG/DL (ref 65–100)
GLUCOSE BLD STRIP.AUTO-MCNC: 282 MG/DL (ref 65–100)
GLUCOSE BLD STRIP.AUTO-MCNC: 283 MG/DL (ref 65–100)
GLUCOSE BLD STRIP.AUTO-MCNC: 283 MG/DL (ref 65–100)
GLUCOSE BLD STRIP.AUTO-MCNC: 286 MG/DL (ref 65–100)
GLUCOSE BLD STRIP.AUTO-MCNC: 288 MG/DL (ref 65–100)
GLUCOSE BLD STRIP.AUTO-MCNC: 288 MG/DL (ref 65–100)
GLUCOSE BLD STRIP.AUTO-MCNC: 289 MG/DL (ref 65–100)
GLUCOSE BLD STRIP.AUTO-MCNC: 290 MG/DL (ref 65–100)
GLUCOSE BLD STRIP.AUTO-MCNC: 291 MG/DL (ref 65–100)
GLUCOSE BLD STRIP.AUTO-MCNC: 295 MG/DL (ref 65–100)
GLUCOSE BLD STRIP.AUTO-MCNC: 297 MG/DL (ref 65–100)
GLUCOSE BLD STRIP.AUTO-MCNC: 298 MG/DL (ref 65–100)
GLUCOSE BLD STRIP.AUTO-MCNC: 298 MG/DL (ref 65–100)
GLUCOSE BLD STRIP.AUTO-MCNC: 303 MG/DL (ref 65–100)
GLUCOSE BLD STRIP.AUTO-MCNC: 304 MG/DL (ref 65–100)
GLUCOSE BLD STRIP.AUTO-MCNC: 305 MG/DL (ref 65–100)
GLUCOSE BLD STRIP.AUTO-MCNC: 306 MG/DL (ref 65–100)
GLUCOSE BLD STRIP.AUTO-MCNC: 309 MG/DL (ref 65–100)
GLUCOSE BLD STRIP.AUTO-MCNC: 310 MG/DL (ref 65–100)
GLUCOSE BLD STRIP.AUTO-MCNC: 311 MG/DL (ref 65–100)
GLUCOSE BLD STRIP.AUTO-MCNC: 312 MG/DL (ref 65–100)
GLUCOSE BLD STRIP.AUTO-MCNC: 313 MG/DL (ref 65–100)
GLUCOSE BLD STRIP.AUTO-MCNC: 314 MG/DL (ref 65–100)
GLUCOSE BLD STRIP.AUTO-MCNC: 321 MG/DL (ref 65–100)
GLUCOSE BLD STRIP.AUTO-MCNC: 326 MG/DL (ref 65–100)
GLUCOSE BLD STRIP.AUTO-MCNC: 329 MG/DL (ref 65–100)
GLUCOSE BLD STRIP.AUTO-MCNC: 331 MG/DL (ref 65–100)
GLUCOSE BLD STRIP.AUTO-MCNC: 332 MG/DL (ref 65–100)
GLUCOSE BLD STRIP.AUTO-MCNC: 333 MG/DL (ref 65–100)
GLUCOSE BLD STRIP.AUTO-MCNC: 337 MG/DL (ref 65–100)
GLUCOSE BLD STRIP.AUTO-MCNC: 360 MG/DL (ref 65–100)
GLUCOSE BLD STRIP.AUTO-MCNC: 369 MG/DL (ref 65–100)
GLUCOSE BLD STRIP.AUTO-MCNC: 376 MG/DL (ref 65–100)
GLUCOSE BLD STRIP.AUTO-MCNC: 379 MG/DL (ref 65–100)
GLUCOSE BLD STRIP.AUTO-MCNC: 397 MG/DL (ref 65–100)
GLUCOSE BLD STRIP.AUTO-MCNC: 404 MG/DL (ref 65–100)
GLUCOSE BLD STRIP.AUTO-MCNC: 416 MG/DL (ref 65–100)
GLUCOSE BLD STRIP.AUTO-MCNC: 488 MG/DL (ref 65–100)
GLUCOSE BLD STRIP.AUTO-MCNC: 490 MG/DL (ref 65–100)
GLUCOSE BLD STRIP.AUTO-MCNC: 50 MG/DL (ref 65–100)
GLUCOSE BLD STRIP.AUTO-MCNC: 52 MG/DL (ref 65–100)
GLUCOSE BLD STRIP.AUTO-MCNC: 53 MG/DL (ref 65–100)
GLUCOSE BLD STRIP.AUTO-MCNC: 55 MG/DL (ref 65–100)
GLUCOSE BLD STRIP.AUTO-MCNC: 56 MG/DL (ref 65–100)
GLUCOSE BLD STRIP.AUTO-MCNC: 57 MG/DL (ref 65–100)
GLUCOSE BLD STRIP.AUTO-MCNC: 65 MG/DL (ref 65–100)
GLUCOSE BLD STRIP.AUTO-MCNC: 66 MG/DL (ref 65–100)
GLUCOSE BLD STRIP.AUTO-MCNC: 69 MG/DL (ref 65–100)
GLUCOSE BLD STRIP.AUTO-MCNC: 72 MG/DL (ref 65–100)
GLUCOSE BLD STRIP.AUTO-MCNC: 76 MG/DL (ref 65–100)
GLUCOSE BLD STRIP.AUTO-MCNC: 76 MG/DL (ref 65–100)
GLUCOSE BLD STRIP.AUTO-MCNC: 81 MG/DL (ref 65–100)
GLUCOSE BLD STRIP.AUTO-MCNC: 81 MG/DL (ref 65–100)
GLUCOSE BLD STRIP.AUTO-MCNC: 83 MG/DL (ref 65–100)
GLUCOSE BLD STRIP.AUTO-MCNC: 83 MG/DL (ref 65–100)
GLUCOSE BLD STRIP.AUTO-MCNC: 84 MG/DL (ref 65–100)
GLUCOSE BLD STRIP.AUTO-MCNC: 87 MG/DL (ref 65–100)
GLUCOSE BLD STRIP.AUTO-MCNC: 90 MG/DL (ref 65–100)
GLUCOSE BLD STRIP.AUTO-MCNC: 91 MG/DL (ref 65–100)
GLUCOSE BLD STRIP.AUTO-MCNC: 91 MG/DL (ref 65–100)
GLUCOSE BLD STRIP.AUTO-MCNC: 92 MG/DL (ref 65–100)
GLUCOSE BLD STRIP.AUTO-MCNC: 95 MG/DL (ref 65–100)
GLUCOSE BLD STRIP.AUTO-MCNC: 96 MG/DL (ref 65–100)
GLUCOSE BLD STRIP.AUTO-MCNC: 96 MG/DL (ref 65–100)
GLUCOSE BLD STRIP.AUTO-MCNC: 97 MG/DL (ref 65–100)
GLUCOSE SERPL-MCNC: 104 MG/DL (ref 65–100)
GLUCOSE SERPL-MCNC: 106 MG/DL (ref 65–100)
GLUCOSE SERPL-MCNC: 108 MG/DL (ref 65–100)
GLUCOSE SERPL-MCNC: 110 MG/DL (ref 65–100)
GLUCOSE SERPL-MCNC: 111 MG/DL (ref 65–100)
GLUCOSE SERPL-MCNC: 114 MG/DL (ref 65–100)
GLUCOSE SERPL-MCNC: 114 MG/DL (ref 65–100)
GLUCOSE SERPL-MCNC: 117 MG/DL (ref 65–100)
GLUCOSE SERPL-MCNC: 120 MG/DL (ref 65–100)
GLUCOSE SERPL-MCNC: 120 MG/DL (ref 65–100)
GLUCOSE SERPL-MCNC: 121 MG/DL (ref 65–100)
GLUCOSE SERPL-MCNC: 121 MG/DL (ref 65–100)
GLUCOSE SERPL-MCNC: 122 MG/DL (ref 65–100)
GLUCOSE SERPL-MCNC: 122 MG/DL (ref 65–100)
GLUCOSE SERPL-MCNC: 126 MG/DL (ref 65–100)
GLUCOSE SERPL-MCNC: 128 MG/DL (ref 65–100)
GLUCOSE SERPL-MCNC: 129 MG/DL (ref 65–100)
GLUCOSE SERPL-MCNC: 135 MG/DL (ref 65–100)
GLUCOSE SERPL-MCNC: 137 MG/DL (ref 65–100)
GLUCOSE SERPL-MCNC: 137 MG/DL (ref 65–100)
GLUCOSE SERPL-MCNC: 138 MG/DL (ref 65–100)
GLUCOSE SERPL-MCNC: 139 MG/DL (ref 65–100)
GLUCOSE SERPL-MCNC: 143 MG/DL (ref 65–100)
GLUCOSE SERPL-MCNC: 144 MG/DL (ref 65–100)
GLUCOSE SERPL-MCNC: 145 MG/DL (ref 65–100)
GLUCOSE SERPL-MCNC: 146 MG/DL (ref 65–100)
GLUCOSE SERPL-MCNC: 149 MG/DL (ref 65–100)
GLUCOSE SERPL-MCNC: 149 MG/DL (ref 65–100)
GLUCOSE SERPL-MCNC: 151 MG/DL (ref 65–100)
GLUCOSE SERPL-MCNC: 151 MG/DL (ref 65–100)
GLUCOSE SERPL-MCNC: 152 MG/DL (ref 65–100)
GLUCOSE SERPL-MCNC: 152 MG/DL (ref 65–100)
GLUCOSE SERPL-MCNC: 156 MG/DL (ref 65–100)
GLUCOSE SERPL-MCNC: 160 MG/DL (ref 65–100)
GLUCOSE SERPL-MCNC: 160 MG/DL (ref 65–100)
GLUCOSE SERPL-MCNC: 161 MG/DL (ref 65–100)
GLUCOSE SERPL-MCNC: 161 MG/DL (ref 65–100)
GLUCOSE SERPL-MCNC: 162 MG/DL (ref 65–100)
GLUCOSE SERPL-MCNC: 162 MG/DL (ref 65–100)
GLUCOSE SERPL-MCNC: 164 MG/DL (ref 65–100)
GLUCOSE SERPL-MCNC: 165 MG/DL (ref 65–100)
GLUCOSE SERPL-MCNC: 166 MG/DL (ref 65–100)
GLUCOSE SERPL-MCNC: 175 MG/DL (ref 65–100)
GLUCOSE SERPL-MCNC: 178 MG/DL (ref 65–100)
GLUCOSE SERPL-MCNC: 179 MG/DL (ref 65–100)
GLUCOSE SERPL-MCNC: 182 MG/DL (ref 65–100)
GLUCOSE SERPL-MCNC: 183 MG/DL (ref 65–100)
GLUCOSE SERPL-MCNC: 187 MG/DL (ref 65–100)
GLUCOSE SERPL-MCNC: 188 MG/DL (ref 65–100)
GLUCOSE SERPL-MCNC: 190 MG/DL (ref 65–100)
GLUCOSE SERPL-MCNC: 191 MG/DL (ref 65–100)
GLUCOSE SERPL-MCNC: 194 MG/DL (ref 65–100)
GLUCOSE SERPL-MCNC: 198 MG/DL (ref 65–100)
GLUCOSE SERPL-MCNC: 201 MG/DL (ref 65–100)
GLUCOSE SERPL-MCNC: 201 MG/DL (ref 65–100)
GLUCOSE SERPL-MCNC: 206 MG/DL (ref 65–100)
GLUCOSE SERPL-MCNC: 208 MG/DL (ref 65–100)
GLUCOSE SERPL-MCNC: 209 MG/DL (ref 65–100)
GLUCOSE SERPL-MCNC: 212 MG/DL (ref 65–100)
GLUCOSE SERPL-MCNC: 214 MG/DL (ref 65–100)
GLUCOSE SERPL-MCNC: 215 MG/DL (ref 65–100)
GLUCOSE SERPL-MCNC: 219 MG/DL (ref 65–100)
GLUCOSE SERPL-MCNC: 219 MG/DL (ref 65–100)
GLUCOSE SERPL-MCNC: 220 MG/DL (ref 65–100)
GLUCOSE SERPL-MCNC: 221 MG/DL (ref 65–100)
GLUCOSE SERPL-MCNC: 223 MG/DL (ref 65–100)
GLUCOSE SERPL-MCNC: 230 MG/DL (ref 65–100)
GLUCOSE SERPL-MCNC: 234 MG/DL (ref 65–100)
GLUCOSE SERPL-MCNC: 236 MG/DL (ref 65–100)
GLUCOSE SERPL-MCNC: 244 MG/DL (ref 65–100)
GLUCOSE SERPL-MCNC: 248 MG/DL (ref 65–100)
GLUCOSE SERPL-MCNC: 252 MG/DL (ref 65–100)
GLUCOSE SERPL-MCNC: 252 MG/DL (ref 65–100)
GLUCOSE SERPL-MCNC: 261 MG/DL (ref 65–100)
GLUCOSE SERPL-MCNC: 264 MG/DL (ref 65–100)
GLUCOSE SERPL-MCNC: 267 MG/DL (ref 65–100)
GLUCOSE SERPL-MCNC: 268 MG/DL (ref 65–100)
GLUCOSE SERPL-MCNC: 273 MG/DL (ref 65–100)
GLUCOSE SERPL-MCNC: 273 MG/DL (ref 65–100)
GLUCOSE SERPL-MCNC: 282 MG/DL (ref 65–100)
GLUCOSE SERPL-MCNC: 283 MG/DL (ref 65–100)
GLUCOSE SERPL-MCNC: 298 MG/DL (ref 65–100)
GLUCOSE SERPL-MCNC: 345 MG/DL (ref 65–100)
GLUCOSE SERPL-MCNC: 352 MG/DL (ref 65–100)
GLUCOSE SERPL-MCNC: 358 MG/DL (ref 65–100)
GLUCOSE SERPL-MCNC: 368 MG/DL (ref 65–100)
GLUCOSE SERPL-MCNC: 402 MG/DL (ref 65–100)
GLUCOSE SERPL-MCNC: 47 MG/DL (ref 65–100)
GLUCOSE SERPL-MCNC: 69 MG/DL (ref 65–100)
GLUCOSE SERPL-MCNC: 74 MG/DL (ref 65–100)
GLUCOSE SERPL-MCNC: 79 MG/DL (ref 65–100)
GLUCOSE SERPL-MCNC: 86 MG/DL (ref 65–100)
GLUCOSE SERPL-MCNC: 86 MG/DL (ref 65–100)
GLUCOSE SERPL-MCNC: 87 MG/DL (ref 65–100)
GLUCOSE SERPL-MCNC: 89 MG/DL (ref 65–100)
GLUCOSE SERPL-MCNC: 93 MG/DL (ref 65–100)
GLUCOSE SERPL-MCNC: 96 MG/DL (ref 65–100)
GLUCOSE SERPL-MCNC: 97 MG/DL (ref 65–100)
GLUCOSE SERPL-MCNC: 98 MG/DL (ref 65–100)
GLUCOSE UR STRIP.AUTO-MCNC: NEGATIVE MG/DL
GRAM STN SPEC: ABNORMAL
HAV IGM SER QL: NONREACTIVE
HAV IGM SER QL: NONREACTIVE
HBA1C MFR BLD: 6 % (ref 4.2–6.3)
HBA1C MFR BLD: 6.4 % (ref 4.2–6.3)
HBV CORE IGM SER QL: NONREACTIVE
HBV CORE IGM SER QL: NONREACTIVE
HBV SURFACE AG SER QL: NONREACTIVE
HCO3 BLD-SCNC: 18.8 MMOL/L (ref 22–26)
HCO3 BLD-SCNC: 18.8 MMOL/L (ref 22–26)
HCO3 BLD-SCNC: 20 MMOL/L (ref 22–26)
HCO3 BLD-SCNC: 20.4 MMOL/L (ref 22–26)
HCO3 BLD-SCNC: 20.6 MMOL/L (ref 22–26)
HCO3 BLD-SCNC: 20.7 MMOL/L (ref 22–26)
HCO3 BLD-SCNC: 21 MMOL/L (ref 22–26)
HCO3 BLD-SCNC: 21.1 MMOL/L (ref 22–26)
HCO3 BLD-SCNC: 21.2 MMOL/L (ref 22–26)
HCO3 BLD-SCNC: 21.4 MMOL/L (ref 22–26)
HCO3 BLD-SCNC: 21.6 MMOL/L (ref 22–26)
HCO3 BLD-SCNC: 21.7 MMOL/L (ref 22–26)
HCO3 BLD-SCNC: 22 MMOL/L (ref 22–26)
HCO3 BLD-SCNC: 22.9 MMOL/L (ref 22–26)
HCO3 BLD-SCNC: 23.1 MMOL/L (ref 22–26)
HCO3 BLD-SCNC: 23.6 MMOL/L (ref 22–26)
HCO3 BLD-SCNC: 23.8 MMOL/L (ref 22–26)
HCO3 BLD-SCNC: 23.9 MMOL/L (ref 22–26)
HCO3 BLD-SCNC: 24.9 MMOL/L (ref 22–26)
HCO3 BLD-SCNC: 25 MMOL/L (ref 22–26)
HCO3 BLD-SCNC: 25.2 MMOL/L (ref 22–26)
HCO3 BLD-SCNC: 25.6 MMOL/L (ref 22–26)
HCO3 BLD-SCNC: 25.8 MMOL/L (ref 22–26)
HCO3 BLD-SCNC: 26.2 MMOL/L (ref 22–26)
HCO3 BLD-SCNC: 26.2 MMOL/L (ref 22–26)
HCO3 BLD-SCNC: 26.6 MMOL/L (ref 22–26)
HCO3 BLD-SCNC: 27.6 MMOL/L (ref 22–26)
HCO3 BLD-SCNC: 27.8 MMOL/L (ref 22–26)
HCO3 BLD-SCNC: 27.9 MMOL/L (ref 22–26)
HCO3 BLD-SCNC: 28.5 MMOL/L (ref 22–26)
HCO3 BLD-SCNC: 28.6 MMOL/L (ref 22–26)
HCO3 BLD-SCNC: 28.7 MMOL/L (ref 22–26)
HCO3 BLD-SCNC: 28.9 MMOL/L (ref 22–26)
HCO3 BLD-SCNC: 29 MMOL/L (ref 22–26)
HCO3 BLD-SCNC: 29.3 MMOL/L (ref 22–26)
HCO3 BLD-SCNC: 29.7 MMOL/L (ref 22–26)
HCO3 BLD-SCNC: 30.4 MMOL/L (ref 22–26)
HCO3 BLD-SCNC: 30.8 MMOL/L (ref 22–26)
HCO3 BLD-SCNC: 30.9 MMOL/L (ref 22–26)
HCO3 BLD-SCNC: 31.3 MMOL/L (ref 22–26)
HCO3 BLD-SCNC: 31.3 MMOL/L (ref 22–26)
HCO3 BLD-SCNC: 32.8 MMOL/L (ref 22–26)
HCO3 BLD-SCNC: 32.9 MMOL/L (ref 22–26)
HCO3 BLD-SCNC: 33.5 MMOL/L (ref 22–26)
HCO3 BLD-SCNC: 33.9 MMOL/L (ref 22–26)
HCO3 BLD-SCNC: 34.9 MMOL/L (ref 22–26)
HCO3 BLD-SCNC: 35.5 MMOL/L (ref 22–26)
HCO3 BLD-SCNC: 35.6 MMOL/L (ref 22–26)
HCO3 BLD-SCNC: 36.3 MMOL/L (ref 22–26)
HCO3 BLD-SCNC: 37.2 MMOL/L (ref 22–26)
HCO3 BLD-SCNC: 37.9 MMOL/L (ref 22–26)
HCO3 BLD-SCNC: 37.9 MMOL/L (ref 22–26)
HCO3 BLD-SCNC: 38.6 MMOL/L (ref 22–26)
HCO3 BLD-SCNC: 38.9 MMOL/L (ref 22–26)
HCO3 BLD-SCNC: 39.1 MMOL/L (ref 22–26)
HCO3 BLD-SCNC: 39.3 MMOL/L (ref 22–26)
HCO3 BLD-SCNC: 39.5 MMOL/L (ref 22–26)
HCO3 BLD-SCNC: 39.9 MMOL/L (ref 22–26)
HCO3 BLD-SCNC: 40 MMOL/L (ref 22–26)
HCO3 BLD-SCNC: 40.2 MMOL/L (ref 22–26)
HCO3 BLD-SCNC: 40.6 MMOL/L (ref 22–26)
HCO3 BLD-SCNC: 40.8 MMOL/L (ref 22–26)
HCO3 BLD-SCNC: 41.2 MMOL/L (ref 22–26)
HCO3 BLD-SCNC: 41.3 MMOL/L (ref 22–26)
HCO3 BLD-SCNC: 42 MMOL/L (ref 22–26)
HCO3 BLD-SCNC: 42.1 MMOL/L (ref 22–26)
HCO3 BLD-SCNC: 43.2 MMOL/L (ref 22–26)
HCO3 BLD-SCNC: 43.7 MMOL/L (ref 22–26)
HCO3 BLD-SCNC: 44.7 MMOL/L (ref 22–26)
HCO3 BLD-SCNC: 46.4 MMOL/L (ref 22–26)
HCO3 BLD-SCNC: 46.9 MMOL/L (ref 22–26)
HCO3 BLD-SCNC: 47.5 MMOL/L (ref 22–26)
HCO3 BLDV-SCNC: 20.3 MMOL/L (ref 23–28)
HCO3 BLDV-SCNC: 21.3 MMOL/L (ref 23–28)
HCO3 BLDV-SCNC: 24.1 MMOL/L (ref 23–28)
HCT VFR BLD AUTO: 19.3 % (ref 35.8–46.3)
HCT VFR BLD AUTO: 19.9 % (ref 35.8–46.3)
HCT VFR BLD AUTO: 19.9 % (ref 35.8–46.3)
HCT VFR BLD AUTO: 20.9 % (ref 35.8–46.3)
HCT VFR BLD AUTO: 21.5 % (ref 35.8–46.3)
HCT VFR BLD AUTO: 21.6 % (ref 35.8–46.3)
HCT VFR BLD AUTO: 21.7 % (ref 35.8–46.3)
HCT VFR BLD AUTO: 21.8 % (ref 35.8–46.3)
HCT VFR BLD AUTO: 22.2 % (ref 35.8–46.3)
HCT VFR BLD AUTO: 22.3 % (ref 35.8–46.3)
HCT VFR BLD AUTO: 22.3 % (ref 35.8–46.3)
HCT VFR BLD AUTO: 22.6 % (ref 35.8–46.3)
HCT VFR BLD AUTO: 22.8 % (ref 35.8–46.3)
HCT VFR BLD AUTO: 22.9 % (ref 35.8–46.3)
HCT VFR BLD AUTO: 23.3 % (ref 35.8–46.3)
HCT VFR BLD AUTO: 23.4 % (ref 35.8–46.3)
HCT VFR BLD AUTO: 23.7 % (ref 35.8–46.3)
HCT VFR BLD AUTO: 24 % (ref 35.8–46.3)
HCT VFR BLD AUTO: 24.1 % (ref 35.8–46.3)
HCT VFR BLD AUTO: 24.2 % (ref 35.8–46.3)
HCT VFR BLD AUTO: 24.2 % (ref 35.8–46.3)
HCT VFR BLD AUTO: 24.3 % (ref 35.8–46.3)
HCT VFR BLD AUTO: 24.4 % (ref 35.8–46.3)
HCT VFR BLD AUTO: 24.5 % (ref 35.8–46.3)
HCT VFR BLD AUTO: 24.5 % (ref 35.8–46.3)
HCT VFR BLD AUTO: 24.8 % (ref 35.8–46.3)
HCT VFR BLD AUTO: 24.8 % (ref 35.8–46.3)
HCT VFR BLD AUTO: 24.9 % (ref 35.8–46.3)
HCT VFR BLD AUTO: 25 % (ref 35.8–46.3)
HCT VFR BLD AUTO: 25.1 % (ref 35.8–46.3)
HCT VFR BLD AUTO: 25.2 % (ref 35.8–46.3)
HCT VFR BLD AUTO: 25.2 % (ref 35.8–46.3)
HCT VFR BLD AUTO: 25.3 % (ref 35.8–46.3)
HCT VFR BLD AUTO: 25.3 % (ref 35.8–46.3)
HCT VFR BLD AUTO: 25.4 % (ref 35.8–46.3)
HCT VFR BLD AUTO: 25.5 % (ref 35.8–46.3)
HCT VFR BLD AUTO: 25.6 % (ref 35.8–46.3)
HCT VFR BLD AUTO: 25.7 % (ref 35.8–46.3)
HCT VFR BLD AUTO: 25.8 % (ref 35.8–46.3)
HCT VFR BLD AUTO: 25.9 % (ref 35.8–46.3)
HCT VFR BLD AUTO: 26 % (ref 35.8–46.3)
HCT VFR BLD AUTO: 26 % (ref 35.8–46.3)
HCT VFR BLD AUTO: 26.1 % (ref 35.8–46.3)
HCT VFR BLD AUTO: 26.1 % (ref 35.8–46.3)
HCT VFR BLD AUTO: 26.2 % (ref 35.8–46.3)
HCT VFR BLD AUTO: 26.3 % (ref 35.8–46.3)
HCT VFR BLD AUTO: 26.3 % (ref 35.8–46.3)
HCT VFR BLD AUTO: 26.5 % (ref 35.8–46.3)
HCT VFR BLD AUTO: 26.6 % (ref 35.8–46.3)
HCT VFR BLD AUTO: 26.7 % (ref 35.8–46.3)
HCT VFR BLD AUTO: 26.8 % (ref 35.8–46.3)
HCT VFR BLD AUTO: 27 % (ref 35.8–46.3)
HCT VFR BLD AUTO: 27 % (ref 35.8–46.3)
HCT VFR BLD AUTO: 27.1 % (ref 35.8–46.3)
HCT VFR BLD AUTO: 27.1 % (ref 35.8–46.3)
HCT VFR BLD AUTO: 27.2 % (ref 35.8–46.3)
HCT VFR BLD AUTO: 27.3 % (ref 35.8–46.3)
HCT VFR BLD AUTO: 27.3 % (ref 35.8–46.3)
HCT VFR BLD AUTO: 27.4 % (ref 35.8–46.3)
HCT VFR BLD AUTO: 27.4 % (ref 35.8–46.3)
HCT VFR BLD AUTO: 27.5 % (ref 35.8–46.3)
HCT VFR BLD AUTO: 27.6 % (ref 35.8–46.3)
HCT VFR BLD AUTO: 27.6 % (ref 35.8–46.3)
HCT VFR BLD AUTO: 27.7 % (ref 35.8–46.3)
HCT VFR BLD AUTO: 27.7 % (ref 35.8–46.3)
HCT VFR BLD AUTO: 28 % (ref 35.8–46.3)
HCT VFR BLD AUTO: 28.1 % (ref 35.8–46.3)
HCT VFR BLD AUTO: 28.3 % (ref 35.8–46.3)
HCT VFR BLD AUTO: 28.3 % (ref 35.8–46.3)
HCT VFR BLD AUTO: 28.6 % (ref 35.8–46.3)
HCT VFR BLD AUTO: 28.6 % (ref 35.8–46.3)
HCT VFR BLD AUTO: 28.7 % (ref 35.8–46.3)
HCT VFR BLD AUTO: 29.1 % (ref 35.8–46.3)
HCT VFR BLD AUTO: 29.7 % (ref 35.8–46.3)
HCT VFR BLD AUTO: 30.2 % (ref 35.8–46.3)
HCT VFR BLD AUTO: 30.3 % (ref 35.8–46.3)
HCT VFR BLD AUTO: 30.5 % (ref 35.8–46.3)
HCT VFR BLD AUTO: 30.5 % (ref 35.8–46.3)
HCT VFR BLD AUTO: 31 % (ref 35.8–46.3)
HCT VFR BLD AUTO: 32.9 % (ref 35.8–46.3)
HCT VFR BLD AUTO: NORMAL % (ref 35.8–46.3)
HCV AB SER QL: NONREACTIVE
HCV AB SER QL: NONREACTIVE
HCYS SERPL-SCNC: 17.4 UMOL/L (ref 0–21.3)
HEMOCCULT STL QL: NEGATIVE
HEMOCCULT STL QL: NEGATIVE
HEMOCCULT STL QL: POSITIVE
HEPARIN INDUCED PLT,XHIPA: NEGATIVE
HGB BLD-MCNC: 10.3 G/DL (ref 11.7–15.4)
HGB BLD-MCNC: 6.1 G/DL (ref 11.7–15.4)
HGB BLD-MCNC: 6.2 G/DL (ref 11.7–15.4)
HGB BLD-MCNC: 6.3 G/DL (ref 11.7–15.4)
HGB BLD-MCNC: 6.4 G/DL (ref 11.7–15.4)
HGB BLD-MCNC: 6.4 G/DL (ref 11.7–15.4)
HGB BLD-MCNC: 6.6 G/DL (ref 11.7–15.4)
HGB BLD-MCNC: 6.7 G/DL (ref 11.7–15.4)
HGB BLD-MCNC: 6.7 G/DL (ref 11.7–15.4)
HGB BLD-MCNC: 6.8 G/DL (ref 11.7–15.4)
HGB BLD-MCNC: 6.9 G/DL (ref 11.7–15.4)
HGB BLD-MCNC: 7 G/DL (ref 11.7–15.4)
HGB BLD-MCNC: 7.1 G/DL (ref 11.7–15.4)
HGB BLD-MCNC: 7.3 G/DL (ref 11.7–15.4)
HGB BLD-MCNC: 7.4 G/DL (ref 11.7–15.4)
HGB BLD-MCNC: 7.5 G/DL (ref 11.7–15.4)
HGB BLD-MCNC: 7.6 G/DL (ref 11.7–15.4)
HGB BLD-MCNC: 7.7 G/DL (ref 11.7–15.4)
HGB BLD-MCNC: 7.8 G/DL (ref 11.7–15.4)
HGB BLD-MCNC: 7.9 G/DL (ref 11.7–15.4)
HGB BLD-MCNC: 8 G/DL (ref 11.7–15.4)
HGB BLD-MCNC: 8.1 G/DL (ref 11.7–15.4)
HGB BLD-MCNC: 8.2 G/DL (ref 11.7–15.4)
HGB BLD-MCNC: 8.3 G/DL (ref 11.7–15.4)
HGB BLD-MCNC: 8.4 G/DL (ref 11.7–15.4)
HGB BLD-MCNC: 8.5 G/DL (ref 11.7–15.4)
HGB BLD-MCNC: 8.6 G/DL (ref 11.7–15.4)
HGB BLD-MCNC: 8.7 G/DL (ref 11.7–15.4)
HGB BLD-MCNC: 8.7 G/DL (ref 11.7–15.4)
HGB BLD-MCNC: 8.8 G/DL (ref 11.7–15.4)
HGB BLD-MCNC: 8.8 G/DL (ref 11.7–15.4)
HGB BLD-MCNC: 8.9 G/DL (ref 11.7–15.4)
HGB BLD-MCNC: 9 G/DL (ref 11.7–15.4)
HGB BLD-MCNC: 9 G/DL (ref 11.7–15.4)
HGB BLD-MCNC: 9.1 G/DL (ref 11.7–15.4)
HGB BLD-MCNC: 9.2 G/DL (ref 11.7–15.4)
HGB BLD-MCNC: 9.5 G/DL (ref 11.7–15.4)
HGB BLD-MCNC: 9.6 G/DL (ref 11.7–15.4)
HGB BLD-MCNC: 9.6 G/DL (ref 11.7–15.4)
HGB BLD-MCNC: 9.9 G/DL (ref 11.7–15.4)
HGB BLD-MCNC: NORMAL G/DL (ref 11.7–15.4)
HGB UR QL STRIP: ABNORMAL
HISTORY CHECKED?,CKHIST: NORMAL
HIT INTERPRETATION,XINTPR: NEGATIVE
HIT PROFILE,XHITT: NORMAL
IMM GRANULOCYTES # BLD AUTO: 0 K/UL (ref 0–0.5)
IMM GRANULOCYTES # BLD AUTO: 0.1 K/UL (ref 0–0.5)
IMM GRANULOCYTES # BLD AUTO: 0.2 K/UL (ref 0–0.5)
IMM GRANULOCYTES # BLD AUTO: 0.3 K/UL (ref 0–0.5)
IMM GRANULOCYTES # BLD AUTO: 0.3 K/UL (ref 0–0.5)
IMM GRANULOCYTES # BLD AUTO: 0.4 K/UL (ref 0–0.5)
IMM GRANULOCYTES # BLD AUTO: 0.5 K/UL (ref 0–0.5)
IMM GRANULOCYTES # BLD AUTO: 0.7 K/UL (ref 0–0.5)
IMM GRANULOCYTES # BLD AUTO: 1 K/UL (ref 0–0.5)
IMM GRANULOCYTES NFR BLD AUTO: 0 % (ref 0–5)
IMM GRANULOCYTES NFR BLD AUTO: 1 % (ref 0–5)
IMM GRANULOCYTES NFR BLD AUTO: 2 % (ref 0–5)
IMM GRANULOCYTES NFR BLD AUTO: 3 % (ref 0–5)
IMM GRANULOCYTES NFR BLD AUTO: 5 % (ref 0–5)
IMM GRANULOCYTES NFR BLD AUTO: 5 % (ref 0–5)
INR PPP: 0.9
INR PPP: 0.9
INR PPP: 1
INR PPP: 1
INSPIRATION.DURATION SETTING TIME VENT: 0.7 SEC
INSPIRATION.DURATION SETTING TIME VENT: 0.8 SEC
INSPIRATION.DURATION SETTING TIME VENT: 0.84 SEC
INSPIRATION.DURATION SETTING TIME VENT: 0.85 SEC
INSPIRATION.DURATION SETTING TIME VENT: 0.9 SEC
INSPIRATION.DURATION SETTING TIME VENT: 0.95 SEC
INSPIRATION.DURATION SETTING TIME VENT: 1 SEC
IPAP/PIP, IPAPIP: 24
IRON SATN MFR SERPL: 27 %
IRON SERPL-MCNC: 64 UG/DL (ref 35–150)
KETONES UR QL STRIP.AUTO: NEGATIVE MG/DL
LACTATE SERPL-SCNC: 0.5 MMOL/L (ref 0.4–2)
LACTATE SERPL-SCNC: 0.7 MMOL/L (ref 0.4–2)
LACTATE SERPL-SCNC: 0.7 MMOL/L (ref 0.4–2)
LACTATE SERPL-SCNC: 0.8 MMOL/L (ref 0.4–2)
LACTATE SERPL-SCNC: 1.1 MMOL/L (ref 0.4–2)
LACTATE SERPL-SCNC: 1.9 MMOL/L (ref 0.4–2)
LACTATE SERPL-SCNC: 2.7 MMOL/L (ref 0.4–2)
LEUKOCYTE ESTERASE UR QL STRIP.AUTO: ABNORMAL
LIPASE SERPL-CCNC: 481 U/L (ref 73–393)
LYMPHOCYTES # BLD: 0.3 K/UL (ref 0.5–4.6)
LYMPHOCYTES # BLD: 0.5 K/UL (ref 0.5–4.6)
LYMPHOCYTES # BLD: 0.6 K/UL (ref 0.5–4.6)
LYMPHOCYTES # BLD: 0.7 K/UL (ref 0.5–4.6)
LYMPHOCYTES # BLD: 0.8 K/UL (ref 0.5–4.6)
LYMPHOCYTES # BLD: 0.9 K/UL (ref 0.5–4.6)
LYMPHOCYTES # BLD: 1 K/UL (ref 0.5–4.6)
LYMPHOCYTES # BLD: 1.1 K/UL (ref 0.5–4.6)
LYMPHOCYTES # BLD: 1.2 K/UL (ref 0.5–4.6)
LYMPHOCYTES # BLD: 1.3 K/UL (ref 0.5–4.6)
LYMPHOCYTES # BLD: 1.4 K/UL (ref 0.5–4.6)
LYMPHOCYTES # BLD: 1.5 K/UL (ref 0.5–4.6)
LYMPHOCYTES # BLD: 1.6 K/UL (ref 0.5–4.6)
LYMPHOCYTES # BLD: 1.6 K/UL (ref 0.5–4.6)
LYMPHOCYTES # BLD: 1.7 K/UL (ref 0.5–4.6)
LYMPHOCYTES # BLD: 1.8 K/UL (ref 0.5–4.6)
LYMPHOCYTES # BLD: 1.8 K/UL (ref 0.5–4.6)
LYMPHOCYTES # BLD: 1.9 K/UL (ref 0.5–4.6)
LYMPHOCYTES # BLD: 1.9 K/UL (ref 0.5–4.6)
LYMPHOCYTES # BLD: 2 K/UL (ref 0.5–4.6)
LYMPHOCYTES # BLD: 2.2 K/UL (ref 0.5–4.6)
LYMPHOCYTES # BLD: 2.7 K/UL (ref 0.5–4.6)
LYMPHOCYTES NFR BLD: 10 % (ref 13–44)
LYMPHOCYTES NFR BLD: 11 % (ref 13–44)
LYMPHOCYTES NFR BLD: 11 % (ref 13–44)
LYMPHOCYTES NFR BLD: 12 % (ref 13–44)
LYMPHOCYTES NFR BLD: 12 % (ref 13–44)
LYMPHOCYTES NFR BLD: 13 % (ref 13–44)
LYMPHOCYTES NFR BLD: 14 % (ref 13–44)
LYMPHOCYTES NFR BLD: 14 % (ref 13–44)
LYMPHOCYTES NFR BLD: 15 % (ref 13–44)
LYMPHOCYTES NFR BLD: 15 % (ref 13–44)
LYMPHOCYTES NFR BLD: 16 % (ref 13–44)
LYMPHOCYTES NFR BLD: 16 % (ref 13–44)
LYMPHOCYTES NFR BLD: 17 % (ref 13–44)
LYMPHOCYTES NFR BLD: 17 % (ref 13–44)
LYMPHOCYTES NFR BLD: 18 % (ref 13–44)
LYMPHOCYTES NFR BLD: 2 % (ref 13–44)
LYMPHOCYTES NFR BLD: 2 % (ref 13–44)
LYMPHOCYTES NFR BLD: 20 % (ref 13–44)
LYMPHOCYTES NFR BLD: 22 % (ref 13–44)
LYMPHOCYTES NFR BLD: 23 % (ref 13–44)
LYMPHOCYTES NFR BLD: 24 % (ref 13–44)
LYMPHOCYTES NFR BLD: 27 % (ref 13–44)
LYMPHOCYTES NFR BLD: 27 % (ref 13–44)
LYMPHOCYTES NFR BLD: 3 % (ref 13–44)
LYMPHOCYTES NFR BLD: 3 % (ref 13–44)
LYMPHOCYTES NFR BLD: 30 % (ref 13–44)
LYMPHOCYTES NFR BLD: 31 % (ref 13–44)
LYMPHOCYTES NFR BLD: 33 % (ref 13–44)
LYMPHOCYTES NFR BLD: 4 % (ref 13–44)
LYMPHOCYTES NFR BLD: 4 % (ref 13–44)
LYMPHOCYTES NFR BLD: 5 % (ref 13–44)
LYMPHOCYTES NFR BLD: 6 % (ref 13–44)
LYMPHOCYTES NFR BLD: 7 % (ref 13–44)
LYMPHOCYTES NFR BLD: 8 % (ref 13–44)
LYMPHOCYTES NFR BLD: 9 % (ref 13–44)
Lab: NORMAL
MAGNESIUM SERPL-MCNC: 1.9 MG/DL (ref 1.8–2.4)
MAGNESIUM SERPL-MCNC: 2.1 MG/DL (ref 1.8–2.4)
MAGNESIUM SERPL-MCNC: 2.1 MG/DL (ref 1.8–2.4)
MAGNESIUM SERPL-MCNC: 2.2 MG/DL (ref 1.8–2.4)
MAGNESIUM SERPL-MCNC: 2.3 MG/DL (ref 1.8–2.4)
MAGNESIUM SERPL-MCNC: 2.4 MG/DL (ref 1.8–2.4)
MAGNESIUM SERPL-MCNC: 2.4 MG/DL (ref 1.8–2.4)
MAGNESIUM SERPL-MCNC: 2.5 MG/DL (ref 1.8–2.4)
MAGNESIUM SERPL-MCNC: 2.6 MG/DL (ref 1.8–2.4)
MAGNESIUM SERPL-MCNC: 2.7 MG/DL (ref 1.8–2.4)
MAGNESIUM SERPL-MCNC: 2.8 MG/DL (ref 1.8–2.4)
MAGNESIUM SERPL-MCNC: 2.8 MG/DL (ref 1.8–2.4)
MAGNESIUM SERPL-MCNC: 2.9 MG/DL (ref 1.8–2.4)
MAGNESIUM SERPL-MCNC: 2.9 MG/DL (ref 1.8–2.4)
MAGNESIUM SERPL-MCNC: 3 MG/DL (ref 1.8–2.4)
MCH RBC QN AUTO: 29.2 PG (ref 26.1–32.9)
MCH RBC QN AUTO: 29.3 PG (ref 26.1–32.9)
MCH RBC QN AUTO: 29.3 PG (ref 26.1–32.9)
MCH RBC QN AUTO: 29.4 PG (ref 26.1–32.9)
MCH RBC QN AUTO: 29.5 PG (ref 26.1–32.9)
MCH RBC QN AUTO: 29.6 PG (ref 26.1–32.9)
MCH RBC QN AUTO: 29.7 PG (ref 26.1–32.9)
MCH RBC QN AUTO: 29.8 PG (ref 26.1–32.9)
MCH RBC QN AUTO: 29.9 PG (ref 26.1–32.9)
MCH RBC QN AUTO: 30 PG (ref 26.1–32.9)
MCH RBC QN AUTO: 30.1 PG (ref 26.1–32.9)
MCH RBC QN AUTO: 30.2 PG (ref 26.1–32.9)
MCH RBC QN AUTO: 30.3 PG (ref 26.1–32.9)
MCH RBC QN AUTO: 30.4 PG (ref 26.1–32.9)
MCH RBC QN AUTO: 30.5 PG (ref 26.1–32.9)
MCH RBC QN AUTO: 30.6 PG (ref 26.1–32.9)
MCH RBC QN AUTO: 30.7 PG (ref 26.1–32.9)
MCH RBC QN AUTO: 30.8 PG (ref 26.1–32.9)
MCH RBC QN AUTO: 30.8 PG (ref 26.1–32.9)
MCH RBC QN AUTO: 30.9 PG (ref 26.1–32.9)
MCH RBC QN AUTO: 31.1 PG (ref 26.1–32.9)
MCH RBC QN AUTO: 31.2 PG (ref 26.1–32.9)
MCH RBC QN AUTO: 31.4 PG (ref 26.1–32.9)
MCH RBC QN AUTO: 31.6 PG (ref 26.1–32.9)
MCH RBC QN AUTO: 31.7 PG (ref 26.1–32.9)
MCH RBC QN AUTO: 31.8 PG (ref 26.1–32.9)
MCH RBC QN AUTO: 31.8 PG (ref 26.1–32.9)
MCH RBC QN AUTO: 31.9 PG (ref 26.1–32.9)
MCH RBC QN AUTO: 32 PG (ref 26.1–32.9)
MCH RBC QN AUTO: 32.1 PG (ref 26.1–32.9)
MCH RBC QN AUTO: 32.1 PG (ref 26.1–32.9)
MCH RBC QN AUTO: 32.2 PG (ref 26.1–32.9)
MCH RBC QN AUTO: 32.3 PG (ref 26.1–32.9)
MCH RBC QN AUTO: 32.4 PG (ref 26.1–32.9)
MCH RBC QN AUTO: 32.5 PG (ref 26.1–32.9)
MCH RBC QN AUTO: 32.5 PG (ref 26.1–32.9)
MCH RBC QN AUTO: 32.9 PG (ref 26.1–32.9)
MCH RBC QN AUTO: NORMAL PG (ref 26.1–32.9)
MCHC RBC AUTO-ENTMCNC: 28.2 G/DL (ref 31.4–35)
MCHC RBC AUTO-ENTMCNC: 29.2 G/DL (ref 31.4–35)
MCHC RBC AUTO-ENTMCNC: 29.3 G/DL (ref 31.4–35)
MCHC RBC AUTO-ENTMCNC: 29.4 G/DL (ref 31.4–35)
MCHC RBC AUTO-ENTMCNC: 29.5 G/DL (ref 31.4–35)
MCHC RBC AUTO-ENTMCNC: 29.7 G/DL (ref 31.4–35)
MCHC RBC AUTO-ENTMCNC: 29.7 G/DL (ref 31.4–35)
MCHC RBC AUTO-ENTMCNC: 29.8 G/DL (ref 31.4–35)
MCHC RBC AUTO-ENTMCNC: 29.8 G/DL (ref 31.4–35)
MCHC RBC AUTO-ENTMCNC: 29.9 G/DL (ref 31.4–35)
MCHC RBC AUTO-ENTMCNC: 30 G/DL (ref 31.4–35)
MCHC RBC AUTO-ENTMCNC: 30.1 G/DL (ref 31.4–35)
MCHC RBC AUTO-ENTMCNC: 30.2 G/DL (ref 31.4–35)
MCHC RBC AUTO-ENTMCNC: 30.3 G/DL (ref 31.4–35)
MCHC RBC AUTO-ENTMCNC: 30.4 G/DL (ref 31.4–35)
MCHC RBC AUTO-ENTMCNC: 30.5 G/DL (ref 31.4–35)
MCHC RBC AUTO-ENTMCNC: 30.6 G/DL (ref 31.4–35)
MCHC RBC AUTO-ENTMCNC: 30.6 G/DL (ref 31.4–35)
MCHC RBC AUTO-ENTMCNC: 30.7 G/DL (ref 31.4–35)
MCHC RBC AUTO-ENTMCNC: 30.8 G/DL (ref 31.4–35)
MCHC RBC AUTO-ENTMCNC: 30.8 G/DL (ref 31.4–35)
MCHC RBC AUTO-ENTMCNC: 30.9 G/DL (ref 31.4–35)
MCHC RBC AUTO-ENTMCNC: 31 G/DL (ref 31.4–35)
MCHC RBC AUTO-ENTMCNC: 31.1 G/DL (ref 31.4–35)
MCHC RBC AUTO-ENTMCNC: 31.2 G/DL (ref 31.4–35)
MCHC RBC AUTO-ENTMCNC: 31.3 G/DL (ref 31.4–35)
MCHC RBC AUTO-ENTMCNC: 31.4 G/DL (ref 31.4–35)
MCHC RBC AUTO-ENTMCNC: 31.5 G/DL (ref 31.4–35)
MCHC RBC AUTO-ENTMCNC: 31.6 G/DL (ref 31.4–35)
MCHC RBC AUTO-ENTMCNC: 31.7 G/DL (ref 31.4–35)
MCHC RBC AUTO-ENTMCNC: 31.8 G/DL (ref 31.4–35)
MCHC RBC AUTO-ENTMCNC: 31.9 G/DL (ref 31.4–35)
MCHC RBC AUTO-ENTMCNC: 31.9 G/DL (ref 31.4–35)
MCHC RBC AUTO-ENTMCNC: 32.1 G/DL (ref 31.4–35)
MCHC RBC AUTO-ENTMCNC: 32.2 G/DL (ref 31.4–35)
MCHC RBC AUTO-ENTMCNC: 32.3 G/DL (ref 31.4–35)
MCHC RBC AUTO-ENTMCNC: 32.3 G/DL (ref 31.4–35)
MCHC RBC AUTO-ENTMCNC: 32.5 G/DL (ref 31.4–35)
MCHC RBC AUTO-ENTMCNC: 32.6 G/DL (ref 31.4–35)
MCHC RBC AUTO-ENTMCNC: 33.2 G/DL (ref 31.4–35)
MCHC RBC AUTO-ENTMCNC: NORMAL G/DL (ref 31.4–35)
MCV RBC AUTO: 100 FL (ref 79.6–97.8)
MCV RBC AUTO: 100.4 FL (ref 79.6–97.8)
MCV RBC AUTO: 100.4 FL (ref 79.6–97.8)
MCV RBC AUTO: 100.5 FL (ref 79.6–97.8)
MCV RBC AUTO: 100.8 FL (ref 79.6–97.8)
MCV RBC AUTO: 100.9 FL (ref 79.6–97.8)
MCV RBC AUTO: 101.2 FL (ref 79.6–97.8)
MCV RBC AUTO: 101.2 FL (ref 79.6–97.8)
MCV RBC AUTO: 101.5 FL (ref 79.6–97.8)
MCV RBC AUTO: 101.6 FL (ref 79.6–97.8)
MCV RBC AUTO: 101.7 FL (ref 79.6–97.8)
MCV RBC AUTO: 102 FL (ref 79.6–97.8)
MCV RBC AUTO: 102.1 FL (ref 79.6–97.8)
MCV RBC AUTO: 102.5 FL (ref 79.6–97.8)
MCV RBC AUTO: 102.6 FL (ref 79.6–97.8)
MCV RBC AUTO: 102.6 FL (ref 79.6–97.8)
MCV RBC AUTO: 102.9 FL (ref 79.6–97.8)
MCV RBC AUTO: 103.7 FL (ref 79.6–97.8)
MCV RBC AUTO: 103.8 FL (ref 79.6–97.8)
MCV RBC AUTO: 104.3 FL (ref 79.6–97.8)
MCV RBC AUTO: 104.4 FL (ref 79.6–97.8)
MCV RBC AUTO: 104.5 FL (ref 79.6–97.8)
MCV RBC AUTO: 105 FL (ref 79.6–97.8)
MCV RBC AUTO: 105 FL (ref 79.6–97.8)
MCV RBC AUTO: 105.2 FL (ref 79.6–97.8)
MCV RBC AUTO: 105.8 FL (ref 79.6–97.8)
MCV RBC AUTO: 107.2 FL (ref 79.6–97.8)
MCV RBC AUTO: 91.7 FL (ref 79.6–97.8)
MCV RBC AUTO: 93.2 FL (ref 79.6–97.8)
MCV RBC AUTO: 93.3 FL (ref 79.6–97.8)
MCV RBC AUTO: 93.5 FL (ref 79.6–97.8)
MCV RBC AUTO: 93.5 FL (ref 79.6–97.8)
MCV RBC AUTO: 94.1 FL (ref 79.6–97.8)
MCV RBC AUTO: 94.2 FL (ref 79.6–97.8)
MCV RBC AUTO: 94.4 FL (ref 79.6–97.8)
MCV RBC AUTO: 94.5 FL (ref 79.6–97.8)
MCV RBC AUTO: 94.5 FL (ref 79.6–97.8)
MCV RBC AUTO: 94.6 FL (ref 79.6–97.8)
MCV RBC AUTO: 94.6 FL (ref 79.6–97.8)
MCV RBC AUTO: 94.7 FL (ref 79.6–97.8)
MCV RBC AUTO: 94.8 FL (ref 79.6–97.8)
MCV RBC AUTO: 95.1 FL (ref 79.6–97.8)
MCV RBC AUTO: 95.1 FL (ref 79.6–97.8)
MCV RBC AUTO: 95.2 FL (ref 79.6–97.8)
MCV RBC AUTO: 95.3 FL (ref 79.6–97.8)
MCV RBC AUTO: 95.4 FL (ref 79.6–97.8)
MCV RBC AUTO: 95.4 FL (ref 79.6–97.8)
MCV RBC AUTO: 95.8 FL (ref 79.6–97.8)
MCV RBC AUTO: 96.2 FL (ref 79.6–97.8)
MCV RBC AUTO: 96.4 FL (ref 79.6–97.8)
MCV RBC AUTO: 96.6 FL (ref 79.6–97.8)
MCV RBC AUTO: 96.6 FL (ref 79.6–97.8)
MCV RBC AUTO: 96.9 FL (ref 79.6–97.8)
MCV RBC AUTO: 97.1 FL (ref 79.6–97.8)
MCV RBC AUTO: 97.2 FL (ref 79.6–97.8)
MCV RBC AUTO: 97.3 FL (ref 79.6–97.8)
MCV RBC AUTO: 97.4 FL (ref 79.6–97.8)
MCV RBC AUTO: 97.4 FL (ref 79.6–97.8)
MCV RBC AUTO: 97.5 FL (ref 79.6–97.8)
MCV RBC AUTO: 97.7 FL (ref 79.6–97.8)
MCV RBC AUTO: 97.8 FL (ref 79.6–97.8)
MCV RBC AUTO: 97.8 FL (ref 79.6–97.8)
MCV RBC AUTO: 98 FL (ref 79.6–97.8)
MCV RBC AUTO: 98 FL (ref 79.6–97.8)
MCV RBC AUTO: 98.1 FL (ref 79.6–97.8)
MCV RBC AUTO: 98.3 FL (ref 79.6–97.8)
MCV RBC AUTO: 98.3 FL (ref 79.6–97.8)
MCV RBC AUTO: 98.4 FL (ref 79.6–97.8)
MCV RBC AUTO: 98.5 FL (ref 79.6–97.8)
MCV RBC AUTO: 98.8 FL (ref 79.6–97.8)
MCV RBC AUTO: 98.8 FL (ref 79.6–97.8)
MCV RBC AUTO: 99.2 FL (ref 79.6–97.8)
MCV RBC AUTO: 99.3 FL (ref 79.6–97.8)
MCV RBC AUTO: 99.6 FL (ref 79.6–97.8)
MCV RBC AUTO: 99.6 FL (ref 79.6–97.8)
MCV RBC AUTO: NORMAL FL (ref 79.6–97.8)
METHYLMALONATE SERPL-SCNC: 327 NMOL/L (ref 0–378)
MONOCYTES # BLD: 0.1 K/UL (ref 0.1–1.3)
MONOCYTES # BLD: 0.1 K/UL (ref 0.1–1.3)
MONOCYTES # BLD: 0.2 K/UL (ref 0.1–1.3)
MONOCYTES # BLD: 0.3 K/UL (ref 0.1–1.3)
MONOCYTES # BLD: 0.4 K/UL (ref 0.1–1.3)
MONOCYTES # BLD: 0.5 K/UL (ref 0.1–1.3)
MONOCYTES # BLD: 0.6 K/UL (ref 0.1–1.3)
MONOCYTES # BLD: 0.7 K/UL (ref 0.1–1.3)
MONOCYTES # BLD: 0.8 K/UL (ref 0.1–1.3)
MONOCYTES # BLD: 0.9 K/UL (ref 0.1–1.3)
MONOCYTES # BLD: 1 K/UL (ref 0.1–1.3)
MONOCYTES # BLD: 1.1 K/UL (ref 0.1–1.3)
MONOCYTES # BLD: 1.1 K/UL (ref 0.1–1.3)
MONOCYTES # BLD: 1.2 K/UL (ref 0.1–1.3)
MONOCYTES # BLD: 1.3 K/UL (ref 0.1–1.3)
MONOCYTES # BLD: 1.3 K/UL (ref 0.1–1.3)
MONOCYTES # BLD: 1.4 K/UL (ref 0.1–1.3)
MONOCYTES # BLD: 1.5 K/UL (ref 0.1–1.3)
MONOCYTES # BLD: 1.5 K/UL (ref 0.1–1.3)
MONOCYTES # BLD: 1.6 K/UL (ref 0.1–1.3)
MONOCYTES # BLD: 1.7 K/UL (ref 0.1–1.3)
MONOCYTES # BLD: 1.7 K/UL (ref 0.1–1.3)
MONOCYTES # BLD: 2 K/UL (ref 0.1–1.3)
MONOCYTES # BLD: 2.1 K/UL (ref 0.1–1.3)
MONOCYTES # BLD: 2.2 K/UL (ref 0.1–1.3)
MONOCYTES NFR BLD: 1 % (ref 4–12)
MONOCYTES NFR BLD: 1 % (ref 4–12)
MONOCYTES NFR BLD: 10 % (ref 4–12)
MONOCYTES NFR BLD: 11 % (ref 4–12)
MONOCYTES NFR BLD: 13 % (ref 4–12)
MONOCYTES NFR BLD: 14 % (ref 4–12)
MONOCYTES NFR BLD: 15 % (ref 4–12)
MONOCYTES NFR BLD: 16 % (ref 4–12)
MONOCYTES NFR BLD: 2 % (ref 4–12)
MONOCYTES NFR BLD: 3 % (ref 4–12)
MONOCYTES NFR BLD: 4 % (ref 4–12)
MONOCYTES NFR BLD: 5 % (ref 4–12)
MONOCYTES NFR BLD: 6 % (ref 4–12)
MONOCYTES NFR BLD: 7 % (ref 4–12)
MONOCYTES NFR BLD: 8 % (ref 4–12)
MONOCYTES NFR BLD: 9 % (ref 4–12)
MUCOUS THREADS URNS QL MICRO: 0 /LPF
NEUTS SEG # BLD: 1.9 K/UL (ref 1.7–8.2)
NEUTS SEG # BLD: 10 K/UL (ref 1.7–8.2)
NEUTS SEG # BLD: 10.3 K/UL (ref 1.7–8.2)
NEUTS SEG # BLD: 10.4 K/UL (ref 1.7–8.2)
NEUTS SEG # BLD: 10.6 K/UL (ref 1.7–8.2)
NEUTS SEG # BLD: 11 K/UL (ref 1.7–8.2)
NEUTS SEG # BLD: 11.4 K/UL (ref 1.7–8.2)
NEUTS SEG # BLD: 11.4 K/UL (ref 1.7–8.2)
NEUTS SEG # BLD: 11.6 K/UL (ref 1.7–8.2)
NEUTS SEG # BLD: 11.6 K/UL (ref 1.7–8.2)
NEUTS SEG # BLD: 13.1 K/UL (ref 1.7–8.2)
NEUTS SEG # BLD: 13.2 K/UL (ref 1.7–8.2)
NEUTS SEG # BLD: 13.3 K/UL (ref 1.7–8.2)
NEUTS SEG # BLD: 13.7 K/UL (ref 1.7–8.2)
NEUTS SEG # BLD: 13.7 K/UL (ref 1.7–8.2)
NEUTS SEG # BLD: 14.4 K/UL (ref 1.7–8.2)
NEUTS SEG # BLD: 15.1 K/UL (ref 1.7–8.2)
NEUTS SEG # BLD: 15.5 K/UL (ref 1.7–8.2)
NEUTS SEG # BLD: 2.9 K/UL (ref 1.7–8.2)
NEUTS SEG # BLD: 20.1 K/UL (ref 1.7–8.2)
NEUTS SEG # BLD: 3.1 K/UL (ref 1.7–8.2)
NEUTS SEG # BLD: 3.2 K/UL (ref 1.7–8.2)
NEUTS SEG # BLD: 3.3 K/UL (ref 1.7–8.2)
NEUTS SEG # BLD: 3.9 K/UL (ref 1.7–8.2)
NEUTS SEG # BLD: 4.4 K/UL (ref 1.7–8.2)
NEUTS SEG # BLD: 4.5 K/UL (ref 1.7–8.2)
NEUTS SEG # BLD: 4.7 K/UL (ref 1.7–8.2)
NEUTS SEG # BLD: 4.8 K/UL (ref 1.7–8.2)
NEUTS SEG # BLD: 4.9 K/UL (ref 1.7–8.2)
NEUTS SEG # BLD: 5.1 K/UL (ref 1.7–8.2)
NEUTS SEG # BLD: 5.3 K/UL (ref 1.7–8.2)
NEUTS SEG # BLD: 5.4 K/UL (ref 1.7–8.2)
NEUTS SEG # BLD: 5.7 K/UL (ref 1.7–8.2)
NEUTS SEG # BLD: 5.7 K/UL (ref 1.7–8.2)
NEUTS SEG # BLD: 5.9 K/UL (ref 1.7–8.2)
NEUTS SEG # BLD: 6 K/UL (ref 1.7–8.2)
NEUTS SEG # BLD: 6.1 K/UL (ref 1.7–8.2)
NEUTS SEG # BLD: 6.2 K/UL (ref 1.7–8.2)
NEUTS SEG # BLD: 6.4 K/UL (ref 1.7–8.2)
NEUTS SEG # BLD: 6.4 K/UL (ref 1.7–8.2)
NEUTS SEG # BLD: 6.5 K/UL (ref 1.7–8.2)
NEUTS SEG # BLD: 6.6 K/UL (ref 1.7–8.2)
NEUTS SEG # BLD: 6.8 K/UL (ref 1.7–8.2)
NEUTS SEG # BLD: 7.3 K/UL (ref 1.7–8.2)
NEUTS SEG # BLD: 7.5 K/UL (ref 1.7–8.2)
NEUTS SEG # BLD: 7.7 K/UL (ref 1.7–8.2)
NEUTS SEG # BLD: 7.9 K/UL (ref 1.7–8.2)
NEUTS SEG # BLD: 8 K/UL (ref 1.7–8.2)
NEUTS SEG # BLD: 8 K/UL (ref 1.7–8.2)
NEUTS SEG # BLD: 8.1 K/UL (ref 1.7–8.2)
NEUTS SEG # BLD: 8.3 K/UL (ref 1.7–8.2)
NEUTS SEG # BLD: 8.4 K/UL (ref 1.7–8.2)
NEUTS SEG # BLD: 8.7 K/UL (ref 1.7–8.2)
NEUTS SEG # BLD: 8.7 K/UL (ref 1.7–8.2)
NEUTS SEG # BLD: 8.9 K/UL (ref 1.7–8.2)
NEUTS SEG # BLD: 9 K/UL (ref 1.7–8.2)
NEUTS SEG # BLD: 9.1 K/UL (ref 1.7–8.2)
NEUTS SEG # BLD: 9.2 K/UL (ref 1.7–8.2)
NEUTS SEG # BLD: 9.3 K/UL (ref 1.7–8.2)
NEUTS SEG # BLD: 9.3 K/UL (ref 1.7–8.2)
NEUTS SEG # BLD: 9.4 K/UL (ref 1.7–8.2)
NEUTS SEG # BLD: 9.5 K/UL (ref 1.7–8.2)
NEUTS SEG # BLD: 9.6 K/UL (ref 1.7–8.2)
NEUTS SEG # BLD: 9.7 K/UL (ref 1.7–8.2)
NEUTS SEG # BLD: 9.9 K/UL (ref 1.7–8.2)
NEUTS SEG NFR BLD: 53 % (ref 43–78)
NEUTS SEG NFR BLD: 58 % (ref 43–78)
NEUTS SEG NFR BLD: 59 % (ref 43–78)
NEUTS SEG NFR BLD: 60 % (ref 43–78)
NEUTS SEG NFR BLD: 61 % (ref 43–78)
NEUTS SEG NFR BLD: 62 % (ref 43–78)
NEUTS SEG NFR BLD: 63 % (ref 43–78)
NEUTS SEG NFR BLD: 65 % (ref 43–78)
NEUTS SEG NFR BLD: 66 % (ref 43–78)
NEUTS SEG NFR BLD: 66 % (ref 43–78)
NEUTS SEG NFR BLD: 67 % (ref 43–78)
NEUTS SEG NFR BLD: 68 % (ref 43–78)
NEUTS SEG NFR BLD: 70 % (ref 43–78)
NEUTS SEG NFR BLD: 70 % (ref 43–78)
NEUTS SEG NFR BLD: 71 % (ref 43–78)
NEUTS SEG NFR BLD: 72 % (ref 43–78)
NEUTS SEG NFR BLD: 73 % (ref 43–78)
NEUTS SEG NFR BLD: 74 % (ref 43–78)
NEUTS SEG NFR BLD: 74 % (ref 43–78)
NEUTS SEG NFR BLD: 75 % (ref 43–78)
NEUTS SEG NFR BLD: 75 % (ref 43–78)
NEUTS SEG NFR BLD: 77 % (ref 43–78)
NEUTS SEG NFR BLD: 78 % (ref 43–78)
NEUTS SEG NFR BLD: 78 % (ref 43–78)
NEUTS SEG NFR BLD: 79 % (ref 43–78)
NEUTS SEG NFR BLD: 80 % (ref 43–78)
NEUTS SEG NFR BLD: 80 % (ref 43–78)
NEUTS SEG NFR BLD: 81 % (ref 43–78)
NEUTS SEG NFR BLD: 82 % (ref 43–78)
NEUTS SEG NFR BLD: 83 % (ref 43–78)
NEUTS SEG NFR BLD: 84 % (ref 43–78)
NEUTS SEG NFR BLD: 85 % (ref 43–78)
NEUTS SEG NFR BLD: 86 % (ref 43–78)
NEUTS SEG NFR BLD: 87 % (ref 43–78)
NEUTS SEG NFR BLD: 87 % (ref 43–78)
NEUTS SEG NFR BLD: 89 % (ref 43–78)
NEUTS SEG NFR BLD: 91 % (ref 43–78)
NEUTS SEG NFR BLD: 92 % (ref 43–78)
NEUTS SEG NFR BLD: 94 % (ref 43–78)
NEUTS SEG NFR BLD: 95 % (ref 43–78)
NEUTS SEG NFR BLD: 96 % (ref 43–78)
NITRITE UR QL STRIP.AUTO: NEGATIVE
NITRITE UR QL STRIP.AUTO: POSITIVE
NRBC # BLD: 0 K/UL (ref 0–0.2)
NRBC # BLD: 0.02 K/UL (ref 0–0.2)
NRBC # BLD: 0.03 K/UL (ref 0–0.2)
NRBC # BLD: 0.03 K/UL (ref 0–0.2)
NRBC # BLD: 0.04 K/UL (ref 0–0.2)
NRBC # BLD: 0.05 K/UL (ref 0–0.2)
NRBC # BLD: 0.05 K/UL (ref 0–0.2)
NRBC # BLD: 0.06 K/UL (ref 0–0.2)
NRBC # BLD: 0.06 K/UL (ref 0–0.2)
NRBC # BLD: 0.08 K/UL (ref 0–0.2)
NRBC # BLD: NORMAL K/UL
O2/TOTAL GAS SETTING VFR VENT: 100 %
O2/TOTAL GAS SETTING VFR VENT: 28 %
O2/TOTAL GAS SETTING VFR VENT: 30 %
O2/TOTAL GAS SETTING VFR VENT: 35 %
O2/TOTAL GAS SETTING VFR VENT: 40 %
O2/TOTAL GAS SETTING VFR VENT: 45 %
O2/TOTAL GAS SETTING VFR VENT: 50 %
O2/TOTAL GAS SETTING VFR VENT: 55 %
O2/TOTAL GAS SETTING VFR VENT: 55 %
O2/TOTAL GAS SETTING VFR VENT: 60 %
O2/TOTAL GAS SETTING VFR VENT: 65 %
O2/TOTAL GAS SETTING VFR VENT: 70 %
O2/TOTAL GAS SETTING VFR VENT: 90 %
OPTICAL DENSITY READ,XHITAO: 0.07 ABS
ORGANISM ID, SPNG3: NORMAL
OSMOLALITY SERPL: 316 MOSM/KG H2O (ref 280–301)
OSMOLALITY UR: 405 MOSM/KG H2O (ref 50–1400)
OTHER OBSERVATIONS,UCOM: ABNORMAL
P-R INTERVAL, ECG05: 162 MS
P-R INTERVAL, ECG05: 184 MS
P-R INTERVAL, ECG05: 196 MS
P-R INTERVAL, ECG05: 228 MS
P-R INTERVAL, ECG05: 246 MS
PAW @ MEAN EXP FLOW ON VENT: 12 CMH2O
PAW @ MEAN EXP FLOW ON VENT: 13 CMH2O
PAW @ MEAN EXP FLOW ON VENT: 13 CMH2O
PAW @ MEAN EXP FLOW ON VENT: 14 CMH2O
PAW @ MEAN EXP FLOW ON VENT: 16 CMH2O
PAW @ MEAN EXP FLOW ON VENT: 18 CMH2O
PAW @ MEAN EXP FLOW ON VENT: 9.3 CMH2O
PCO2 BLD: 26.7 MMHG (ref 35–45)
PCO2 BLD: 34.5 MMHG (ref 35–45)
PCO2 BLD: 39.7 MMHG (ref 35–45)
PCO2 BLD: 40.4 MMHG (ref 35–45)
PCO2 BLD: 40.7 MMHG (ref 35–45)
PCO2 BLD: 40.8 MMHG (ref 35–45)
PCO2 BLD: 41.5 MMHG (ref 35–45)
PCO2 BLD: 41.6 MMHG (ref 35–45)
PCO2 BLD: 41.8 MMHG (ref 35–45)
PCO2 BLD: 42.4 MMHG (ref 35–45)
PCO2 BLD: 43.4 MMHG (ref 35–45)
PCO2 BLD: 43.6 MMHG (ref 35–45)
PCO2 BLD: 44.8 MMHG (ref 35–45)
PCO2 BLD: 46 MMHG (ref 35–45)
PCO2 BLD: 46.9 MMHG (ref 35–45)
PCO2 BLD: 47.8 MMHG (ref 35–45)
PCO2 BLD: 48.2 MMHG (ref 35–45)
PCO2 BLD: 49.2 MMHG (ref 35–45)
PCO2 BLD: 49.4 MMHG (ref 35–45)
PCO2 BLD: 49.7 MMHG (ref 35–45)
PCO2 BLD: 50 MMHG (ref 35–45)
PCO2 BLD: 50.7 MMHG (ref 35–45)
PCO2 BLD: 50.9 MMHG (ref 35–45)
PCO2 BLD: 51 MMHG (ref 35–45)
PCO2 BLD: 51.1 MMHG (ref 35–45)
PCO2 BLD: 51.2 MMHG (ref 35–45)
PCO2 BLD: 51.6 MMHG (ref 35–45)
PCO2 BLD: 51.8 MMHG (ref 35–45)
PCO2 BLD: 51.9 MMHG (ref 35–45)
PCO2 BLD: 52.7 MMHG (ref 35–45)
PCO2 BLD: 53.3 MMHG (ref 35–45)
PCO2 BLD: 53.9 MMHG (ref 35–45)
PCO2 BLD: 54.6 MMHG (ref 35–45)
PCO2 BLD: 55.8 MMHG (ref 35–45)
PCO2 BLD: 56.1 MMHG (ref 35–45)
PCO2 BLD: 56.4 MMHG (ref 35–45)
PCO2 BLD: 56.5 MMHG (ref 35–45)
PCO2 BLD: 56.8 MMHG (ref 35–45)
PCO2 BLD: 56.9 MMHG (ref 35–45)
PCO2 BLD: 57.1 MMHG (ref 35–45)
PCO2 BLD: 57.7 MMHG (ref 35–45)
PCO2 BLD: 57.7 MMHG (ref 35–45)
PCO2 BLD: 57.9 MMHG (ref 35–45)
PCO2 BLD: 58 MMHG (ref 35–45)
PCO2 BLD: 58.1 MMHG (ref 35–45)
PCO2 BLD: 58.7 MMHG (ref 35–45)
PCO2 BLD: 58.8 MMHG (ref 35–45)
PCO2 BLD: 59.2 MMHG (ref 35–45)
PCO2 BLD: 59.4 MMHG (ref 35–45)
PCO2 BLD: 59.8 MMHG (ref 35–45)
PCO2 BLD: 59.9 MMHG (ref 35–45)
PCO2 BLD: 60 MMHG (ref 35–45)
PCO2 BLD: 61.1 MMHG (ref 35–45)
PCO2 BLD: 61.7 MMHG (ref 35–45)
PCO2 BLD: 62.1 MMHG (ref 35–45)
PCO2 BLD: 62.4 MMHG (ref 35–45)
PCO2 BLD: 62.7 MMHG (ref 35–45)
PCO2 BLD: 64.2 MMHG (ref 35–45)
PCO2 BLD: 64.6 MMHG (ref 35–45)
PCO2 BLD: 65 MMHG (ref 35–45)
PCO2 BLD: 65.1 MMHG (ref 35–45)
PCO2 BLD: 65.6 MMHG (ref 35–45)
PCO2 BLD: 67.4 MMHG (ref 35–45)
PCO2 BLD: 67.6 MMHG (ref 35–45)
PCO2 BLD: 69.3 MMHG (ref 35–45)
PCO2 BLD: 69.9 MMHG (ref 35–45)
PCO2 BLD: 70.3 MMHG (ref 35–45)
PCO2 BLD: 70.7 MMHG (ref 35–45)
PCO2 BLD: 72.3 MMHG (ref 35–45)
PCO2 BLD: 73.1 MMHG (ref 35–45)
PCO2 BLD: 73.6 MMHG (ref 35–45)
PCO2 BLD: 75.2 MMHG (ref 35–45)
PCO2 BLD: 76.1 MMHG (ref 35–45)
PCO2 BLD: 81.7 MMHG (ref 35–45)
PCO2 BLD: 85.6 MMHG (ref 35–45)
PCO2 BLDCO: 56 MMHG (ref 32–68)
PCO2 BLDV: 36.9 MMHG (ref 41–51)
PCO2 BLDV: 43.7 MMHG (ref 41–51)
PCO2 BLDV: 43.9 MMHG (ref 41–51)
PEEP RESPIRATORY: 10 CMH2O
PEEP RESPIRATORY: 12 CMH2O
PEEP RESPIRATORY: 14 CMH2O
PEEP RESPIRATORY: 18 CMH2O
PEEP RESPIRATORY: 8 CMH2O
PEEP RESPIRATORY: 8 CM[H2O]
PH BLD: 7.19 [PH] (ref 7.35–7.45)
PH BLD: 7.19 [PH] (ref 7.35–7.45)
PH BLD: 7.2 [PH] (ref 7.35–7.45)
PH BLD: 7.22 [PH] (ref 7.35–7.45)
PH BLD: 7.23 [PH] (ref 7.35–7.45)
PH BLD: 7.24 [PH] (ref 7.35–7.45)
PH BLD: 7.24 [PH] (ref 7.35–7.45)
PH BLD: 7.26 [PH] (ref 7.35–7.45)
PH BLD: 7.27 [PH] (ref 7.35–7.45)
PH BLD: 7.28 [PH] (ref 7.35–7.45)
PH BLD: 7.28 [PH] (ref 7.35–7.45)
PH BLD: 7.29 [PH] (ref 7.35–7.45)
PH BLD: 7.3 [PH] (ref 7.35–7.45)
PH BLD: 7.3 [PH] (ref 7.35–7.45)
PH BLD: 7.31 [PH] (ref 7.35–7.45)
PH BLD: 7.32 [PH] (ref 7.35–7.45)
PH BLD: 7.32 [PH] (ref 7.35–7.45)
PH BLD: 7.33 [PH] (ref 7.35–7.45)
PH BLD: 7.33 [PH] (ref 7.35–7.45)
PH BLD: 7.34 [PH] (ref 7.35–7.45)
PH BLD: 7.35 [PH] (ref 7.35–7.45)
PH BLD: 7.36 [PH] (ref 7.35–7.45)
PH BLD: 7.37 [PH] (ref 7.35–7.45)
PH BLD: 7.37 [PH] (ref 7.35–7.45)
PH BLD: 7.38 [PH] (ref 7.35–7.45)
PH BLD: 7.39 [PH] (ref 7.35–7.45)
PH BLD: 7.4 [PH] (ref 7.35–7.45)
PH BLD: 7.4 [PH] (ref 7.35–7.45)
PH BLD: 7.41 [PH] (ref 7.35–7.45)
PH BLD: 7.42 [PH] (ref 7.35–7.45)
PH BLD: 7.42 [PH] (ref 7.35–7.45)
PH BLD: 7.43 [PH] (ref 7.35–7.45)
PH BLD: 7.43 [PH] (ref 7.35–7.45)
PH BLD: 7.44 [PH] (ref 7.35–7.45)
PH BLD: 7.45 [PH] (ref 7.35–7.45)
PH BLD: 7.46 [PH] (ref 7.35–7.45)
PH BLD: 7.5 [PH] (ref 7.35–7.45)
PH BLD: 7.52 [PH] (ref 7.35–7.45)
PH BLD: 7.61 [PH] (ref 7.35–7.45)
PH BLDCO: 7.26 [PH] (ref 7.15–7.38)
PH BLDV: 7.27 [PH] (ref 7.32–7.42)
PH BLDV: 7.3 [PH] (ref 7.32–7.42)
PH BLDV: 7.42 [PH] (ref 7.32–7.42)
PH UR STRIP: 5 [PH] (ref 5–9)
PH UR STRIP: 6 [PH] (ref 5–9)
PH UR STRIP: 6.5 [PH] (ref 5–9)
PH UR STRIP: 7.5 [PH] (ref 5–9)
PHOSPHATE SERPL-MCNC: 1.7 MG/DL (ref 2.3–3.7)
PHOSPHATE SERPL-MCNC: 2.7 MG/DL (ref 2.3–3.7)
PHOSPHATE SERPL-MCNC: 2.7 MG/DL (ref 2.3–3.7)
PHOSPHATE SERPL-MCNC: 2.8 MG/DL (ref 2.3–3.7)
PHOSPHATE SERPL-MCNC: 3.1 MG/DL (ref 2.3–3.7)
PHOSPHATE SERPL-MCNC: 3.2 MG/DL (ref 2.3–3.7)
PHOSPHATE SERPL-MCNC: 3.3 MG/DL (ref 2.3–3.7)
PHOSPHATE SERPL-MCNC: 3.3 MG/DL (ref 2.3–3.7)
PHOSPHATE SERPL-MCNC: 3.4 MG/DL (ref 2.3–3.7)
PHOSPHATE SERPL-MCNC: 3.4 MG/DL (ref 2.3–3.7)
PHOSPHATE SERPL-MCNC: 3.5 MG/DL (ref 2.3–3.7)
PHOSPHATE SERPL-MCNC: 3.6 MG/DL (ref 2.3–3.7)
PHOSPHATE SERPL-MCNC: 4 MG/DL (ref 2.3–3.7)
PHOSPHATE SERPL-MCNC: 4.1 MG/DL (ref 2.3–3.7)
PHOSPHATE SERPL-MCNC: 4.2 MG/DL (ref 2.3–3.7)
PHOSPHATE SERPL-MCNC: 4.3 MG/DL (ref 2.3–3.7)
PHOSPHATE SERPL-MCNC: 4.3 MG/DL (ref 2.3–3.7)
PHOSPHATE SERPL-MCNC: 4.4 MG/DL (ref 2.3–3.7)
PHOSPHATE SERPL-MCNC: 4.5 MG/DL (ref 2.3–3.7)
PHOSPHATE SERPL-MCNC: 4.6 MG/DL (ref 2.3–3.7)
PHOSPHATE SERPL-MCNC: 4.9 MG/DL (ref 2.3–3.7)
PHOSPHATE SERPL-MCNC: 4.9 MG/DL (ref 2.3–3.7)
PHOSPHATE SERPL-MCNC: 5.4 MG/DL (ref 2.3–3.7)
PHOSPHATE SERPL-MCNC: 5.5 MG/DL (ref 2.3–3.7)
PHOSPHATE SERPL-MCNC: 5.5 MG/DL (ref 2.3–3.7)
PHOSPHATE SERPL-MCNC: 5.7 MG/DL (ref 2.3–3.7)
PHOSPHATE SERPL-MCNC: 7.1 MG/DL (ref 2.3–3.7)
PIP ISTAT,IPIP: 10
PIP ISTAT,IPIP: 16
PIP ISTAT,IPIP: 23
PIP ISTAT,IPIP: 27
PIP ISTAT,IPIP: 27
PIP ISTAT,IPIP: 28
PIP ISTAT,IPIP: 30
PLATELET # BLD AUTO: 103 K/UL (ref 150–450)
PLATELET # BLD AUTO: 106 K/UL (ref 150–450)
PLATELET # BLD AUTO: 112 K/UL (ref 150–450)
PLATELET # BLD AUTO: 118 K/UL (ref 150–450)
PLATELET # BLD AUTO: 120 K/UL (ref 150–450)
PLATELET # BLD AUTO: 126 K/UL (ref 150–450)
PLATELET # BLD AUTO: 135 K/UL (ref 150–450)
PLATELET # BLD AUTO: 135 K/UL (ref 150–450)
PLATELET # BLD AUTO: 136 K/UL (ref 150–450)
PLATELET # BLD AUTO: 140 K/UL (ref 150–450)
PLATELET # BLD AUTO: 141 K/UL (ref 150–450)
PLATELET # BLD AUTO: 141 K/UL (ref 150–450)
PLATELET # BLD AUTO: 142 K/UL (ref 150–450)
PLATELET # BLD AUTO: 143 K/UL (ref 150–450)
PLATELET # BLD AUTO: 144 K/UL (ref 150–450)
PLATELET # BLD AUTO: 146 K/UL (ref 150–450)
PLATELET # BLD AUTO: 146 K/UL (ref 150–450)
PLATELET # BLD AUTO: 147 K/UL (ref 150–450)
PLATELET # BLD AUTO: 148 K/UL (ref 150–450)
PLATELET # BLD AUTO: 148 K/UL (ref 150–450)
PLATELET # BLD AUTO: 149 K/UL (ref 150–450)
PLATELET # BLD AUTO: 149 K/UL (ref 150–450)
PLATELET # BLD AUTO: 150 K/UL (ref 150–450)
PLATELET # BLD AUTO: 151 K/UL (ref 150–450)
PLATELET # BLD AUTO: 151 K/UL (ref 150–450)
PLATELET # BLD AUTO: 153 K/UL (ref 150–450)
PLATELET # BLD AUTO: 154 K/UL (ref 150–450)
PLATELET # BLD AUTO: 154 K/UL (ref 150–450)
PLATELET # BLD AUTO: 156 K/UL (ref 150–450)
PLATELET # BLD AUTO: 158 K/UL (ref 150–450)
PLATELET # BLD AUTO: 158 K/UL (ref 150–450)
PLATELET # BLD AUTO: 160 K/UL (ref 150–450)
PLATELET # BLD AUTO: 160 K/UL (ref 150–450)
PLATELET # BLD AUTO: 164 K/UL (ref 150–450)
PLATELET # BLD AUTO: 165 K/UL (ref 150–450)
PLATELET # BLD AUTO: 167 K/UL (ref 150–450)
PLATELET # BLD AUTO: 168 K/UL (ref 150–450)
PLATELET # BLD AUTO: 170 K/UL (ref 150–450)
PLATELET # BLD AUTO: 171 K/UL (ref 150–450)
PLATELET # BLD AUTO: 172 K/UL (ref 150–450)
PLATELET # BLD AUTO: 174 K/UL (ref 150–450)
PLATELET # BLD AUTO: 175 K/UL (ref 150–450)
PLATELET # BLD AUTO: 177 K/UL (ref 150–450)
PLATELET # BLD AUTO: 179 K/UL (ref 150–450)
PLATELET # BLD AUTO: 180 K/UL (ref 150–450)
PLATELET # BLD AUTO: 180 K/UL (ref 150–450)
PLATELET # BLD AUTO: 181 K/UL (ref 150–450)
PLATELET # BLD AUTO: 182 K/UL (ref 150–450)
PLATELET # BLD AUTO: 182 K/UL (ref 150–450)
PLATELET # BLD AUTO: 183 K/UL (ref 150–450)
PLATELET # BLD AUTO: 183 K/UL (ref 150–450)
PLATELET # BLD AUTO: 186 K/UL (ref 150–450)
PLATELET # BLD AUTO: 188 K/UL (ref 150–450)
PLATELET # BLD AUTO: 189 K/UL (ref 150–450)
PLATELET # BLD AUTO: 191 K/UL (ref 150–450)
PLATELET # BLD AUTO: 192 K/UL (ref 150–450)
PLATELET # BLD AUTO: 192 K/UL (ref 150–450)
PLATELET # BLD AUTO: 200 K/UL (ref 150–450)
PLATELET # BLD AUTO: 202 K/UL (ref 150–450)
PLATELET # BLD AUTO: 204 K/UL (ref 150–450)
PLATELET # BLD AUTO: 204 K/UL (ref 150–450)
PLATELET # BLD AUTO: 206 K/UL (ref 150–450)
PLATELET # BLD AUTO: 212 K/UL (ref 150–450)
PLATELET # BLD AUTO: 215 K/UL (ref 150–450)
PLATELET # BLD AUTO: 216 K/UL (ref 150–450)
PLATELET # BLD AUTO: 217 K/UL (ref 150–450)
PLATELET # BLD AUTO: 219 K/UL (ref 150–450)
PLATELET # BLD AUTO: 224 K/UL (ref 150–450)
PLATELET # BLD AUTO: 224 K/UL (ref 150–450)
PLATELET # BLD AUTO: 225 K/UL (ref 150–450)
PLATELET # BLD AUTO: 227 K/UL (ref 150–450)
PLATELET # BLD AUTO: 232 K/UL (ref 150–450)
PLATELET # BLD AUTO: 234 K/UL (ref 150–450)
PLATELET # BLD AUTO: 235 K/UL (ref 150–450)
PLATELET # BLD AUTO: 238 K/UL (ref 150–450)
PLATELET # BLD AUTO: 238 K/UL (ref 150–450)
PLATELET # BLD AUTO: 247 K/UL (ref 150–450)
PLATELET # BLD AUTO: 248 K/UL (ref 150–450)
PLATELET # BLD AUTO: 252 K/UL (ref 150–450)
PLATELET # BLD AUTO: 252 K/UL (ref 150–450)
PLATELET # BLD AUTO: 253 K/UL (ref 150–450)
PLATELET # BLD AUTO: 254 K/UL (ref 150–450)
PLATELET # BLD AUTO: 255 K/UL (ref 150–450)
PLATELET # BLD AUTO: 259 K/UL (ref 150–450)
PLATELET # BLD AUTO: 75 K/UL (ref 150–450)
PLATELET # BLD AUTO: 80 K/UL (ref 150–450)
PLATELET # BLD AUTO: 87 K/UL (ref 150–450)
PLATELET # BLD AUTO: 89 K/UL (ref 150–450)
PLATELET # BLD AUTO: NORMAL K/UL
PLATELET COMMENTS,PCOM: ADEQUATE
PLATELET COMMENTS,PCOM: ADEQUATE
PLEASE NOTE, SPNG4: NORMAL
PMV BLD AUTO: 10.1 FL (ref 9.4–12.3)
PMV BLD AUTO: 10.1 FL (ref 9.4–12.3)
PMV BLD AUTO: 10.2 FL (ref 9.4–12.3)
PMV BLD AUTO: 10.2 FL (ref 9.4–12.3)
PMV BLD AUTO: 10.3 FL (ref 9.4–12.3)
PMV BLD AUTO: 10.4 FL (ref 9.4–12.3)
PMV BLD AUTO: 10.5 FL (ref 9.4–12.3)
PMV BLD AUTO: 10.5 FL (ref 9.4–12.3)
PMV BLD AUTO: 10.6 FL (ref 9.4–12.3)
PMV BLD AUTO: 10.7 FL (ref 9.4–12.3)
PMV BLD AUTO: 10.8 FL (ref 9.4–12.3)
PMV BLD AUTO: 10.9 FL (ref 9.4–12.3)
PMV BLD AUTO: 11 FL (ref 9.4–12.3)
PMV BLD AUTO: 11.1 FL (ref 9.4–12.3)
PMV BLD AUTO: 11.2 FL (ref 9.4–12.3)
PMV BLD AUTO: 11.3 FL (ref 9.4–12.3)
PMV BLD AUTO: 11.4 FL (ref 9.4–12.3)
PMV BLD AUTO: 11.5 FL (ref 9.4–12.3)
PMV BLD AUTO: 11.6 FL (ref 9.4–12.3)
PMV BLD AUTO: 11.7 FL (ref 9.4–12.3)
PMV BLD AUTO: 11.8 FL (ref 9.4–12.3)
PMV BLD AUTO: 11.9 FL (ref 9.4–12.3)
PMV BLD AUTO: 12 FL (ref 9.4–12.3)
PMV BLD AUTO: 12.1 FL (ref 9.4–12.3)
PMV BLD AUTO: 12.1 FL (ref 9.4–12.3)
PMV BLD AUTO: 12.2 FL (ref 9.4–12.3)
PMV BLD AUTO: 12.4 FL (ref 9.4–12.3)
PMV BLD AUTO: 12.4 FL (ref 9.4–12.3)
PMV BLD AUTO: 12.6 FL (ref 9.4–12.3)
PMV BLD AUTO: NORMAL FL (ref 9.4–12.3)
PO2 BLD: 100 MMHG (ref 75–100)
PO2 BLD: 105 MMHG (ref 75–100)
PO2 BLD: 105 MMHG (ref 75–100)
PO2 BLD: 111 MMHG (ref 75–100)
PO2 BLD: 113 MMHG (ref 75–100)
PO2 BLD: 117 MMHG (ref 75–100)
PO2 BLD: 122 MMHG (ref 75–100)
PO2 BLD: 141 MMHG (ref 75–100)
PO2 BLD: 159 MMHG (ref 75–100)
PO2 BLD: 163 MMHG (ref 75–100)
PO2 BLD: 213 MMHG (ref 75–100)
PO2 BLD: 237 MMHG (ref 75–100)
PO2 BLD: 247 MMHG (ref 75–100)
PO2 BLD: 40 MMHG (ref 75–100)
PO2 BLD: 48 MMHG (ref 75–100)
PO2 BLD: 49 MMHG (ref 75–100)
PO2 BLD: 51 MMHG (ref 75–100)
PO2 BLD: 52 MMHG (ref 75–100)
PO2 BLD: 54 MMHG (ref 75–100)
PO2 BLD: 55 MMHG (ref 75–100)
PO2 BLD: 561 MMHG (ref 75–100)
PO2 BLD: 57 MMHG (ref 75–100)
PO2 BLD: 61 MMHG (ref 75–100)
PO2 BLD: 63 MMHG (ref 75–100)
PO2 BLD: 63 MMHG (ref 75–100)
PO2 BLD: 64 MMHG (ref 75–100)
PO2 BLD: 64 MMHG (ref 75–100)
PO2 BLD: 65 MMHG (ref 75–100)
PO2 BLD: 68 MMHG (ref 75–100)
PO2 BLD: 70 MMHG (ref 75–100)
PO2 BLD: 71 MMHG (ref 75–100)
PO2 BLD: 72 MMHG (ref 75–100)
PO2 BLD: 72 MMHG (ref 75–100)
PO2 BLD: 73 MMHG (ref 75–100)
PO2 BLD: 73 MMHG (ref 75–100)
PO2 BLD: 74 MMHG (ref 75–100)
PO2 BLD: 76 MMHG (ref 75–100)
PO2 BLD: 78 MMHG (ref 75–100)
PO2 BLD: 79 MMHG (ref 75–100)
PO2 BLD: 79 MMHG (ref 75–100)
PO2 BLD: 80 MMHG (ref 75–100)
PO2 BLD: 80 MMHG (ref 75–100)
PO2 BLD: 81 MMHG (ref 75–100)
PO2 BLD: 82 MMHG (ref 75–100)
PO2 BLD: 83 MMHG (ref 75–100)
PO2 BLD: 84 MMHG (ref 75–100)
PO2 BLD: 84 MMHG (ref 75–100)
PO2 BLD: 86 MMHG (ref 75–100)
PO2 BLD: 87 MMHG (ref 75–100)
PO2 BLD: 88 MMHG (ref 75–100)
PO2 BLD: 89 MMHG (ref 75–100)
PO2 BLD: 89 MMHG (ref 75–100)
PO2 BLD: 91 MMHG (ref 75–100)
PO2 BLD: 91 MMHG (ref 75–100)
PO2 BLD: 93 MMHG (ref 75–100)
PO2 BLD: 94 MMHG (ref 75–100)
PO2 BLD: 94 MMHG (ref 75–100)
PO2 BLD: 96 MMHG (ref 75–100)
PO2 BLD: 98 MMHG (ref 75–100)
PO2 BLD: 98 MMHG (ref 75–100)
PO2 BLD: 99 MMHG (ref 75–100)
PO2 BLD: 99 MMHG (ref 75–100)
PO2 BLDCO: 54 MMHG
PO2 BLDV: 45 MMHG
PO2 BLDV: 47 MMHG
PO2 BLDV: 58 MMHG
POTASSIUM BLD-SCNC: 4.8 MMOL/L (ref 3.5–5.1)
POTASSIUM SERPL-SCNC: 3.2 MMOL/L (ref 3.5–5.1)
POTASSIUM SERPL-SCNC: 3.3 MMOL/L (ref 3.5–5.1)
POTASSIUM SERPL-SCNC: 3.4 MMOL/L (ref 3.5–5.1)
POTASSIUM SERPL-SCNC: 3.5 MMOL/L (ref 3.5–5.1)
POTASSIUM SERPL-SCNC: 3.6 MMOL/L (ref 3.5–5.1)
POTASSIUM SERPL-SCNC: 3.7 MMOL/L (ref 3.5–5.1)
POTASSIUM SERPL-SCNC: 3.8 MMOL/L (ref 3.5–5.1)
POTASSIUM SERPL-SCNC: 3.9 MMOL/L (ref 3.5–5.1)
POTASSIUM SERPL-SCNC: 4 MMOL/L (ref 3.5–5.1)
POTASSIUM SERPL-SCNC: 4.1 MMOL/L (ref 3.5–5.1)
POTASSIUM SERPL-SCNC: 4.2 MMOL/L (ref 3.5–5.1)
POTASSIUM SERPL-SCNC: 4.3 MMOL/L (ref 3.5–5.1)
POTASSIUM SERPL-SCNC: 4.4 MMOL/L (ref 3.5–5.1)
POTASSIUM SERPL-SCNC: 4.5 MMOL/L (ref 3.5–5.1)
POTASSIUM SERPL-SCNC: 4.6 MMOL/L (ref 3.5–5.1)
POTASSIUM SERPL-SCNC: 4.7 MMOL/L (ref 3.5–5.1)
POTASSIUM SERPL-SCNC: 4.8 MMOL/L (ref 3.5–5.1)
POTASSIUM SERPL-SCNC: 4.8 MMOL/L (ref 3.5–5.1)
POTASSIUM SERPL-SCNC: 4.9 MMOL/L (ref 3.5–5.1)
POTASSIUM SERPL-SCNC: 5 MMOL/L (ref 3.5–5.1)
POTASSIUM SERPL-SCNC: 5.1 MMOL/L (ref 3.5–5.1)
POTASSIUM SERPL-SCNC: 5.1 MMOL/L (ref 3.5–5.1)
POTASSIUM SERPL-SCNC: 5.2 MMOL/L (ref 3.5–5.1)
POTASSIUM SERPL-SCNC: 5.3 MMOL/L (ref 3.5–5.1)
POTASSIUM SERPL-SCNC: 5.3 MMOL/L (ref 3.5–5.1)
POTASSIUM SERPL-SCNC: 5.4 MMOL/L (ref 3.5–5.1)
POTASSIUM SERPL-SCNC: 5.5 MMOL/L (ref 3.5–5.1)
POTASSIUM SERPL-SCNC: 5.6 MMOL/L (ref 3.5–5.1)
POTASSIUM SERPL-SCNC: 5.6 MMOL/L (ref 3.5–5.1)
POTASSIUM SERPL-SCNC: 5.7 MMOL/L (ref 3.5–5.1)
POTASSIUM SERPL-SCNC: 5.7 MMOL/L (ref 3.5–5.1)
POTASSIUM SERPL-SCNC: 5.8 MMOL/L (ref 3.5–5.1)
POTASSIUM SERPL-SCNC: 5.9 MMOL/L (ref 3.5–5.1)
POTASSIUM SERPL-SCNC: 5.9 MMOL/L (ref 3.5–5.1)
POTASSIUM SERPL-SCNC: 6 MMOL/L (ref 3.5–5.1)
POTASSIUM SERPL-SCNC: 6 MMOL/L (ref 3.5–5.1)
POTASSIUM SERPL-SCNC: 6.2 MMOL/L (ref 3.5–5.1)
POTASSIUM SERPL-SCNC: 6.5 MMOL/L (ref 3.5–5.1)
POTASSIUM UR-SCNC: 27 MMOL/L
PRESSURE CONTROL, IPC: 15
PRESSURE SUPPORT SETTING VENT: 0 CMH2O
PRESSURE SUPPORT SETTING VENT: 12 CMH2O
PRESSURE SUPPORT SETTING VENT: 15 CMH2O
PRESSURE SUPPORT SETTING VENT: 15 CMH2O
PRESSURE SUPPORT SETTING VENT: 4 CMH2O
PRESSURE SUPPORT SETTING VENT: 8 CMH2O
PROCALCITONIN SERPL-MCNC: 1.52 NG/ML
PROCALCITONIN SERPL-MCNC: <0.05 NG/ML
PROT SERPL-MCNC: 5.3 G/DL (ref 6.3–8.2)
PROT SERPL-MCNC: 5.5 G/DL (ref 6.3–8.2)
PROT SERPL-MCNC: 5.9 G/DL (ref 6.3–8.2)
PROT SERPL-MCNC: 6.5 G/DL (ref 6.3–8.2)
PROT SERPL-MCNC: 6.6 G/DL (ref 6.3–8.2)
PROT SERPL-MCNC: 6.7 G/DL (ref 6.3–8.2)
PROT SERPL-MCNC: 6.8 G/DL (ref 6.3–8.2)
PROT SERPL-MCNC: 6.9 G/DL (ref 6.3–8.2)
PROT SERPL-MCNC: 7 G/DL (ref 6.3–8.2)
PROT SERPL-MCNC: 7.1 G/DL (ref 6.3–8.2)
PROT SERPL-MCNC: 7.1 G/DL (ref 6.3–8.2)
PROT SERPL-MCNC: 7.2 G/DL (ref 6.3–8.2)
PROT SERPL-MCNC: 7.2 G/DL (ref 6.3–8.2)
PROT SERPL-MCNC: 7.3 G/DL (ref 6.3–8.2)
PROT SERPL-MCNC: 7.4 G/DL (ref 6.3–8.2)
PROT SERPL-MCNC: 7.5 G/DL (ref 6.3–8.2)
PROT UR STRIP-MCNC: 100 MG/DL
PROT UR STRIP-MCNC: 30 MG/DL
PROTHROMBIN TIME: 12.4 SEC (ref 12.5–14.7)
PROTHROMBIN TIME: 12.8 SEC (ref 12.5–14.7)
PROTHROMBIN TIME: 13.2 SEC (ref 12.5–14.7)
PROTHROMBIN TIME: 13.5 SEC (ref 12.5–14.7)
Q-T INTERVAL, ECG07: 406 MS
Q-T INTERVAL, ECG07: 422 MS
Q-T INTERVAL, ECG07: 440 MS
Q-T INTERVAL, ECG07: 482 MS
Q-T INTERVAL, ECG07: 506 MS
Q-T INTERVAL, ECG07: 538 MS
QRS DURATION, ECG06: 80 MS
QRS DURATION, ECG06: 84 MS
QRS DURATION, ECG06: 88 MS
QRS DURATION, ECG06: 90 MS
QRS DURATION, ECG06: 92 MS
QRS DURATION, ECG06: 94 MS
QTC CALCULATION (BEZET), ECG08: 418 MS
QTC CALCULATION (BEZET), ECG08: 439 MS
QTC CALCULATION (BEZET), ECG08: 448 MS
QTC CALCULATION (BEZET), ECG08: 457 MS
QTC CALCULATION (BEZET), ECG08: 465 MS
QTC CALCULATION (BEZET), ECG08: 470 MS
RBC # BLD AUTO: 1.97 M/UL (ref 4.05–5.2)
RBC # BLD AUTO: 2.05 M/UL (ref 4.05–5.2)
RBC # BLD AUTO: 2.06 M/UL (ref 4.05–5.2)
RBC # BLD AUTO: 2.07 M/UL (ref 4.05–5.2)
RBC # BLD AUTO: 2.14 M/UL (ref 4.05–5.2)
RBC # BLD AUTO: 2.17 M/UL (ref 4.05–5.2)
RBC # BLD AUTO: 2.21 M/UL (ref 4.05–5.2)
RBC # BLD AUTO: 2.22 M/UL (ref 4.05–5.2)
RBC # BLD AUTO: 2.23 M/UL (ref 4.05–5.2)
RBC # BLD AUTO: 2.31 M/UL (ref 4.05–5.2)
RBC # BLD AUTO: 2.32 M/UL (ref 4.05–5.2)
RBC # BLD AUTO: 2.33 M/UL (ref 4.05–5.2)
RBC # BLD AUTO: 2.34 M/UL (ref 4.05–5.2)
RBC # BLD AUTO: 2.34 M/UL (ref 4.05–5.2)
RBC # BLD AUTO: 2.35 M/UL (ref 4.05–5.2)
RBC # BLD AUTO: 2.36 M/UL (ref 4.05–5.2)
RBC # BLD AUTO: 2.38 M/UL (ref 4.05–5.2)
RBC # BLD AUTO: 2.39 M/UL (ref 4.05–5.2)
RBC # BLD AUTO: 2.4 M/UL (ref 4.05–5.2)
RBC # BLD AUTO: 2.4 M/UL (ref 4.05–5.2)
RBC # BLD AUTO: 2.41 M/UL (ref 4.05–5.2)
RBC # BLD AUTO: 2.42 M/UL (ref 4.05–5.2)
RBC # BLD AUTO: 2.42 M/UL (ref 4.05–5.2)
RBC # BLD AUTO: 2.43 M/UL (ref 4.05–5.2)
RBC # BLD AUTO: 2.44 M/UL (ref 4.05–5.2)
RBC # BLD AUTO: 2.45 M/UL (ref 4.05–5.2)
RBC # BLD AUTO: 2.46 M/UL (ref 4.05–5.2)
RBC # BLD AUTO: 2.46 M/UL (ref 4.05–5.2)
RBC # BLD AUTO: 2.48 M/UL (ref 4.05–5.2)
RBC # BLD AUTO: 2.49 M/UL (ref 4.05–5.2)
RBC # BLD AUTO: 2.52 M/UL (ref 4.05–5.2)
RBC # BLD AUTO: 2.54 M/UL (ref 4.05–5.2)
RBC # BLD AUTO: 2.57 M/UL (ref 4.05–5.2)
RBC # BLD AUTO: 2.58 M/UL (ref 4.05–5.2)
RBC # BLD AUTO: 2.58 M/UL (ref 4.05–5.2)
RBC # BLD AUTO: 2.59 M/UL (ref 4.05–5.2)
RBC # BLD AUTO: 2.59 M/UL (ref 4.05–5.2)
RBC # BLD AUTO: 2.6 M/UL (ref 4.05–5.2)
RBC # BLD AUTO: 2.6 M/UL (ref 4.05–5.2)
RBC # BLD AUTO: 2.61 M/UL (ref 4.05–5.2)
RBC # BLD AUTO: 2.62 M/UL (ref 4.05–5.2)
RBC # BLD AUTO: 2.63 M/UL (ref 4.05–5.2)
RBC # BLD AUTO: 2.64 M/UL (ref 4.05–5.2)
RBC # BLD AUTO: 2.64 M/UL (ref 4.05–5.2)
RBC # BLD AUTO: 2.67 M/UL (ref 4.05–5.2)
RBC # BLD AUTO: 2.68 M/UL (ref 4.05–5.2)
RBC # BLD AUTO: 2.69 M/UL (ref 4.05–5.2)
RBC # BLD AUTO: 2.7 M/UL (ref 4.05–5.2)
RBC # BLD AUTO: 2.71 M/UL (ref 4.05–5.2)
RBC # BLD AUTO: 2.73 M/UL (ref 4.05–5.2)
RBC # BLD AUTO: 2.74 M/UL (ref 4.05–5.2)
RBC # BLD AUTO: 2.75 M/UL (ref 4.05–5.2)
RBC # BLD AUTO: 2.76 M/UL (ref 4.05–5.2)
RBC # BLD AUTO: 2.77 M/UL (ref 4.05–5.2)
RBC # BLD AUTO: 2.77 M/UL (ref 4.05–5.2)
RBC # BLD AUTO: 2.8 M/UL (ref 4.05–5.2)
RBC # BLD AUTO: 2.81 M/UL (ref 4.05–5.2)
RBC # BLD AUTO: 2.84 M/UL (ref 4.05–5.2)
RBC # BLD AUTO: 2.85 M/UL (ref 4.05–5.2)
RBC # BLD AUTO: 2.88 M/UL (ref 4.05–5.2)
RBC # BLD AUTO: 2.89 M/UL (ref 4.05–5.2)
RBC # BLD AUTO: 2.9 M/UL (ref 4.05–5.2)
RBC # BLD AUTO: 2.91 M/UL (ref 4.05–5.2)
RBC # BLD AUTO: 2.93 M/UL (ref 4.05–5.2)
RBC # BLD AUTO: 2.95 M/UL (ref 4.05–5.2)
RBC # BLD AUTO: 2.99 M/UL (ref 4.05–5.2)
RBC # BLD AUTO: 2.99 M/UL (ref 4.05–5.2)
RBC # BLD AUTO: 3.03 M/UL (ref 4.05–5.2)
RBC # BLD AUTO: 3.05 M/UL (ref 4.05–5.2)
RBC # BLD AUTO: 3.12 M/UL (ref 4.05–5.2)
RBC # BLD AUTO: 3.14 M/UL (ref 4.05–5.2)
RBC # BLD AUTO: 3.23 M/UL (ref 4.05–5.2)
RBC # BLD AUTO: 3.24 M/UL (ref 4.05–5.2)
RBC # BLD AUTO: 3.28 M/UL (ref 4.05–5.2)
RBC # BLD AUTO: 3.52 M/UL (ref 4.05–5.2)
RBC # BLD AUTO: NORMAL M/UL
RBC #/AREA URNS HPF: ABNORMAL /HPF
RBC #/AREA URNS HPF: NORMAL /HPF
RBC #/AREA URNS HPF: NORMAL /HPF
RBC MORPH BLD: ABNORMAL
RBC MORPH BLD: ABNORMAL
RESPIRATORY RATE: 20 (ref 5–40)
S PNEUM AG SPEC QL LA: NEGATIVE
SAO2 % BLD: 100 %
SAO2 % BLD: 100 % (ref 95–98)
SAO2 % BLD: 100 % (ref 95–98)
SAO2 % BLD: 65 % (ref 95–98)
SAO2 % BLD: 75 % (ref 95–98)
SAO2 % BLD: 82 % (ref 95–98)
SAO2 % BLD: 84 % (ref 95–98)
SAO2 % BLD: 84.6 % (ref 95–98)
SAO2 % BLD: 85 % (ref 95–98)
SAO2 % BLD: 86 % (ref 95–98)
SAO2 % BLD: 86 % (ref 95–98)
SAO2 % BLD: 87.8 % (ref 95–98)
SAO2 % BLD: 88 % (ref 95–98)
SAO2 % BLD: 89 % (ref 95–98)
SAO2 % BLD: 90 % (ref 95–98)
SAO2 % BLD: 90 % (ref 95–98)
SAO2 % BLD: 91 % (ref 95–98)
SAO2 % BLD: 92 % (ref 95–98)
SAO2 % BLD: 93 % (ref 95–98)
SAO2 % BLD: 93 % (ref 95–98)
SAO2 % BLD: 94 % (ref 95–98)
SAO2 % BLD: 94.7 % (ref 95–98)
SAO2 % BLD: 94.8 % (ref 95–98)
SAO2 % BLD: 95 % (ref 95–98)
SAO2 % BLD: 95.8 % (ref 95–98)
SAO2 % BLD: 96 % (ref 95–98)
SAO2 % BLD: 96.6 % (ref 95–98)
SAO2 % BLD: 96.6 % (ref 95–98)
SAO2 % BLD: 96.9 % (ref 95–98)
SAO2 % BLD: 97 % (ref 95–98)
SAO2 % BLD: 97.2 % (ref 95–98)
SAO2 % BLD: 97.7 % (ref 95–98)
SAO2 % BLD: 97.7 % (ref 95–98)
SAO2 % BLD: 98 % (ref 95–98)
SAO2 % BLD: 98 % (ref 95–98)
SAO2 % BLD: 98.1 % (ref 95–98)
SAO2 % BLD: 98.7 % (ref 95–98)
SAO2 % BLD: 99 % (ref 95–98)
SAO2 % BLD: 99.8 % (ref 95–98)
SAO2 % BLDV: 75 % (ref 65–88)
SAO2 % BLDV: 78 % (ref 65–88)
SAO2 % BLDV: 90.3 % (ref 65–88)
SARS-COV-2, COV2: NORMAL
SARS-COV-2, COV2: NORMAL
SARS-COV-2, COV2: NOT DETECTED
SERVICE CMNT-IMP: 10.4
SERVICE CMNT-IMP: 9.03
SERVICE CMNT-IMP: ABNORMAL
SERVICE CMNT-IMP: NORMAL
SODIUM BLD-SCNC: 141 MMOL/L (ref 136–145)
SODIUM SERPL-SCNC: 136 MMOL/L (ref 136–145)
SODIUM SERPL-SCNC: 137 MMOL/L (ref 136–145)
SODIUM SERPL-SCNC: 137 MMOL/L (ref 136–145)
SODIUM SERPL-SCNC: 138 MMOL/L (ref 136–145)
SODIUM SERPL-SCNC: 139 MMOL/L (ref 136–145)
SODIUM SERPL-SCNC: 140 MMOL/L (ref 136–145)
SODIUM SERPL-SCNC: 141 MMOL/L (ref 136–145)
SODIUM SERPL-SCNC: 142 MMOL/L (ref 136–145)
SODIUM SERPL-SCNC: 143 MMOL/L (ref 136–145)
SODIUM SERPL-SCNC: 144 MMOL/L (ref 136–145)
SODIUM SERPL-SCNC: 145 MMOL/L (ref 136–145)
SODIUM SERPL-SCNC: 146 MMOL/L (ref 136–145)
SODIUM SERPL-SCNC: 147 MMOL/L (ref 136–145)
SODIUM SERPL-SCNC: 148 MMOL/L (ref 136–145)
SODIUM SERPL-SCNC: 149 MMOL/L (ref 136–145)
SODIUM SERPL-SCNC: 149 MMOL/L (ref 136–145)
SODIUM SERPL-SCNC: 150 MMOL/L (ref 136–145)
SODIUM SERPL-SCNC: 153 MMOL/L (ref 136–145)
SODIUM SERPL-SCNC: 153 MMOL/L (ref 136–145)
SODIUM SERPL-SCNC: 155 MMOL/L (ref 136–145)
SODIUM UR-SCNC: 31 MMOL/L
SOURCE, COVRS: NORMAL
SP GR UR REFRACTOMETRY: 1.01 (ref 1–1.02)
SP GR UR REFRACTOMETRY: 1.02 (ref 1–1.02)
SPECIMEN EXP DATE BLD: NORMAL
SPECIMEN SITE: ABNORMAL
SPECIMEN SOURCE, FCOV2M: NORMAL
SPECIMEN SOURCE: NORMAL
SPECIMEN TYPE: ABNORMAL
SPECIMEN, SPNG1: NORMAL
STATUS OF UNIT,%ST: NORMAL
T4 FREE SERPL-MCNC: 0.9 NG/DL (ref 0.9–1.8)
TIBC SERPL-MCNC: 240 UG/DL (ref 250–450)
TOTAL RESP. RATE, ITRR: 14
TOTAL RESP. RATE, ITRR: 14
TOTAL RESP. RATE, ITRR: 16
TOTAL RESP. RATE, ITRR: 16
TOTAL RESP. RATE, ITRR: 18
TOTAL RESP. RATE, ITRR: 19
TOTAL RESP. RATE, ITRR: 20
TOTAL RESP. RATE, ITRR: 23
TOTAL RESP. RATE, ITRR: 23
TOTAL RESP. RATE, ITRR: 26
TOTAL RESP. RATE, ITRR: 27
TOTAL RESP. RATE, ITRR: 30
TOTAL RESP. RATE, ITRR: 30
TOTAL RESP. RATE, ITRR: 31
TOTAL RESP. RATE, ITRR: 34
TROPONIN-HIGH SENSITIVITY: 31.7 PG/ML (ref 0–14)
TROPONIN-HIGH SENSITIVITY: 41.6 PG/ML (ref 0–14)
TROPONIN-HIGH SENSITIVITY: 42.8 PG/ML (ref 0–14)
TROPONIN-HIGH SENSITIVITY: 47.9 PG/ML (ref 0–14)
TROPONIN-HIGH SENSITIVITY: 94.2 PG/ML (ref 0–14)
TSH SERPL DL<=0.005 MIU/L-ACNC: 4.08 UIU/ML (ref 0.36–3.74)
UFH PPP CHRO-ACNC: 1.09 IU/ML (ref 0.3–0.7)
UFH PPP CHRO-ACNC: >1.1 IU/ML (ref 0.3–0.7)
UNIT DIVISION, %UDIV: 0
UROBILINOGEN UR QL STRIP.AUTO: 0.2 EU/DL (ref 0.2–1)
VANCOMYCIN SERPL-MCNC: 15.3 UG/ML
VANCOMYCIN SERPL-MCNC: 16.6 UG/ML
VANCOMYCIN SERPL-MCNC: 18.2 UG/ML
VANCOMYCIN SERPL-MCNC: 18.8 UG/ML
VANCOMYCIN SERPL-MCNC: 19.3 UG/ML
VANCOMYCIN SERPL-MCNC: 19.7 UG/ML
VANCOMYCIN SERPL-MCNC: 23.3 UG/ML
VANCOMYCIN SERPL-MCNC: 23.5 UG/ML
VANCOMYCIN SERPL-MCNC: 7.5 UG/ML
VENTILATION MODE VENT: ABNORMAL
VENTRICULAR RATE, ECG03: 45 BPM
VENTRICULAR RATE, ECG03: 50 BPM
VENTRICULAR RATE, ECG03: 52 BPM
VENTRICULAR RATE, ECG03: 64 BPM
VENTRICULAR RATE, ECG03: 65 BPM
VENTRICULAR RATE, ECG03: 68 BPM
VIT B12 SERPL-MCNC: 925 PG/ML (ref 193–986)
VT SETTING VENT: 296 ML
VT SETTING VENT: 320 ML
VT SETTING VENT: 350 ML
VT SETTING VENT: 380 ML
VT SETTING VENT: 400 ML
VT SETTING VENT: 409 ML
VT SETTING VENT: 450 ML
WBC # BLD AUTO: 10 K/UL (ref 4.3–11.1)
WBC # BLD AUTO: 10.2 K/UL (ref 4.3–11.1)
WBC # BLD AUTO: 10.4 K/UL (ref 4.3–11.1)
WBC # BLD AUTO: 10.4 K/UL (ref 4.3–11.1)
WBC # BLD AUTO: 10.5 K/UL (ref 4.3–11.1)
WBC # BLD AUTO: 10.5 K/UL (ref 4.3–11.1)
WBC # BLD AUTO: 10.8 K/UL (ref 4.3–11.1)
WBC # BLD AUTO: 11.2 K/UL (ref 4.3–11.1)
WBC # BLD AUTO: 11.2 K/UL (ref 4.3–11.1)
WBC # BLD AUTO: 11.3 K/UL (ref 4.3–11.1)
WBC # BLD AUTO: 11.4 K/UL (ref 4.3–11.1)
WBC # BLD AUTO: 11.4 K/UL (ref 4.3–11.1)
WBC # BLD AUTO: 11.5 K/UL (ref 4.3–11.1)
WBC # BLD AUTO: 11.6 K/UL (ref 4.3–11.1)
WBC # BLD AUTO: 11.8 K/UL (ref 4.3–11.1)
WBC # BLD AUTO: 12 K/UL (ref 4.3–11.1)
WBC # BLD AUTO: 12.3 K/UL (ref 4.3–11.1)
WBC # BLD AUTO: 12.3 K/UL (ref 4.3–11.1)
WBC # BLD AUTO: 12.5 K/UL (ref 4.3–11.1)
WBC # BLD AUTO: 12.6 K/UL (ref 4.3–11.1)
WBC # BLD AUTO: 12.7 K/UL (ref 4.3–11.1)
WBC # BLD AUTO: 13.1 K/UL (ref 4.3–11.1)
WBC # BLD AUTO: 13.4 K/UL (ref 4.3–11.1)
WBC # BLD AUTO: 13.9 K/UL (ref 4.3–11.1)
WBC # BLD AUTO: 14 K/UL (ref 4.3–11.1)
WBC # BLD AUTO: 14.1 K/UL (ref 4.3–11.1)
WBC # BLD AUTO: 14.1 K/UL (ref 4.3–11.1)
WBC # BLD AUTO: 14.2 K/UL (ref 4.3–11.1)
WBC # BLD AUTO: 14.2 K/UL (ref 4.3–11.1)
WBC # BLD AUTO: 14.9 K/UL (ref 4.3–11.1)
WBC # BLD AUTO: 15.2 K/UL (ref 4.3–11.1)
WBC # BLD AUTO: 15.4 K/UL (ref 4.3–11.1)
WBC # BLD AUTO: 15.6 K/UL (ref 4.3–11.1)
WBC # BLD AUTO: 15.6 K/UL (ref 4.3–11.1)
WBC # BLD AUTO: 15.7 K/UL (ref 4.3–11.1)
WBC # BLD AUTO: 15.8 K/UL (ref 4.3–11.1)
WBC # BLD AUTO: 15.8 K/UL (ref 4.3–11.1)
WBC # BLD AUTO: 16.7 K/UL (ref 4.3–11.1)
WBC # BLD AUTO: 16.7 K/UL (ref 4.3–11.1)
WBC # BLD AUTO: 16.9 K/UL (ref 4.3–11.1)
WBC # BLD AUTO: 16.9 K/UL (ref 4.3–11.1)
WBC # BLD AUTO: 17.5 K/UL (ref 4.3–11.1)
WBC # BLD AUTO: 19.1 K/UL (ref 4.3–11.1)
WBC # BLD AUTO: 19.5 K/UL (ref 4.3–11.1)
WBC # BLD AUTO: 19.9 K/UL (ref 4.3–11.1)
WBC # BLD AUTO: 21.8 K/UL (ref 4.3–11.1)
WBC # BLD AUTO: 3.1 K/UL (ref 4.3–11.1)
WBC # BLD AUTO: 4.5 K/UL (ref 4.3–11.1)
WBC # BLD AUTO: 4.9 K/UL (ref 4.3–11.1)
WBC # BLD AUTO: 5.1 K/UL (ref 4.3–11.1)
WBC # BLD AUTO: 5.1 K/UL (ref 4.3–11.1)
WBC # BLD AUTO: 5.2 K/UL (ref 4.3–11.1)
WBC # BLD AUTO: 5.2 K/UL (ref 4.3–11.1)
WBC # BLD AUTO: 5.4 K/UL (ref 4.3–11.1)
WBC # BLD AUTO: 5.6 K/UL (ref 4.3–11.1)
WBC # BLD AUTO: 6.1 K/UL (ref 4.3–11.1)
WBC # BLD AUTO: 6.2 K/UL (ref 4.3–11.1)
WBC # BLD AUTO: 6.3 K/UL (ref 4.3–11.1)
WBC # BLD AUTO: 6.4 K/UL (ref 4.3–11.1)
WBC # BLD AUTO: 7 K/UL (ref 4.3–11.1)
WBC # BLD AUTO: 7 K/UL (ref 4.3–11.1)
WBC # BLD AUTO: 7.1 K/UL (ref 4.3–11.1)
WBC # BLD AUTO: 7.4 K/UL (ref 4.3–11.1)
WBC # BLD AUTO: 7.5 K/UL (ref 4.3–11.1)
WBC # BLD AUTO: 7.5 K/UL (ref 4.3–11.1)
WBC # BLD AUTO: 7.8 K/UL (ref 4.3–11.1)
WBC # BLD AUTO: 7.9 K/UL (ref 4.3–11.1)
WBC # BLD AUTO: 8 K/UL (ref 4.3–11.1)
WBC # BLD AUTO: 8 K/UL (ref 4.3–11.1)
WBC # BLD AUTO: 8.2 K/UL (ref 4.3–11.1)
WBC # BLD AUTO: 8.2 K/UL (ref 4.3–11.1)
WBC # BLD AUTO: 8.3 K/UL (ref 4.3–11.1)
WBC # BLD AUTO: 8.4 K/UL (ref 4.3–11.1)
WBC # BLD AUTO: 8.7 K/UL (ref 4.3–11.1)
WBC # BLD AUTO: 8.8 K/UL (ref 4.3–11.1)
WBC # BLD AUTO: 8.9 K/UL (ref 4.3–11.1)
WBC # BLD AUTO: 8.9 K/UL (ref 4.3–11.1)
WBC # BLD AUTO: 9.1 K/UL (ref 4.3–11.1)
WBC # BLD AUTO: 9.1 K/UL (ref 4.3–11.1)
WBC # BLD AUTO: 9.5 K/UL (ref 4.3–11.1)
WBC # BLD AUTO: 9.6 K/UL (ref 4.3–11.1)
WBC # BLD AUTO: 9.7 K/UL (ref 4.3–11.1)
WBC # BLD AUTO: 9.8 K/UL (ref 4.3–11.1)
WBC # BLD AUTO: 9.8 K/UL (ref 4.3–11.1)
WBC # BLD AUTO: 9.9 K/UL (ref 4.3–11.1)
WBC # BLD AUTO: 9.9 K/UL (ref 4.3–11.1)
WBC # BLD AUTO: NORMAL K/UL (ref 4.3–11.1)
WBC MORPH BLD: ABNORMAL
WBC MORPH BLD: ABNORMAL
WBC URNS QL MICRO: >100 /HPF
WBC URNS QL MICRO: ABNORMAL /HPF
WBC URNS QL MICRO: NORMAL /HPF
YEAST URNS QL MICRO: ABNORMAL

## 2021-01-01 PROCEDURE — 85027 COMPLETE CBC AUTOMATED: CPT

## 2021-01-01 PROCEDURE — 74011000258 HC RX REV CODE- 258: Performed by: INTERNAL MEDICINE

## 2021-01-01 PROCEDURE — 97110 THERAPEUTIC EXERCISES: CPT

## 2021-01-01 PROCEDURE — 0BH17EZ INSERTION OF ENDOTRACHEAL AIRWAY INTO TRACHEA, VIA NATURAL OR ARTIFICIAL OPENING: ICD-10-PCS | Performed by: INTERNAL MEDICINE

## 2021-01-01 PROCEDURE — 94003 VENT MGMT INPAT SUBQ DAY: CPT

## 2021-01-01 PROCEDURE — 82803 BLOOD GASES ANY COMBINATION: CPT

## 2021-01-01 PROCEDURE — 74011636637 HC RX REV CODE- 636/637: Performed by: INTERNAL MEDICINE

## 2021-01-01 PROCEDURE — 65610000006 HC RM INTENSIVE CARE

## 2021-01-01 PROCEDURE — 74011000250 HC RX REV CODE- 250: Performed by: NURSE PRACTITIONER

## 2021-01-01 PROCEDURE — 74011250637 HC RX REV CODE- 250/637: Performed by: INTERNAL MEDICINE

## 2021-01-01 PROCEDURE — 80048 BASIC METABOLIC PNL TOTAL CA: CPT

## 2021-01-01 PROCEDURE — 71045 X-RAY EXAM CHEST 1 VIEW: CPT

## 2021-01-01 PROCEDURE — 74011250636 HC RX REV CODE- 250/636: Performed by: INTERNAL MEDICINE

## 2021-01-01 PROCEDURE — 74011250637 HC RX REV CODE- 250/637: Performed by: NURSE PRACTITIONER

## 2021-01-01 PROCEDURE — 94660 CPAP INITIATION&MGMT: CPT

## 2021-01-01 PROCEDURE — 74011250636 HC RX REV CODE- 250/636: Performed by: HOSPITALIST

## 2021-01-01 PROCEDURE — 65660000000 HC RM CCU STEPDOWN

## 2021-01-01 PROCEDURE — 77010033678 HC OXYGEN DAILY

## 2021-01-01 PROCEDURE — 83735 ASSAY OF MAGNESIUM: CPT

## 2021-01-01 PROCEDURE — 74011000250 HC RX REV CODE- 250: Performed by: INTERNAL MEDICINE

## 2021-01-01 PROCEDURE — 36600 WITHDRAWAL OF ARTERIAL BLOOD: CPT

## 2021-01-01 PROCEDURE — 85025 COMPLETE CBC W/AUTO DIFF WBC: CPT

## 2021-01-01 PROCEDURE — 0B113F4 BYPASS TRACHEA TO CUTANEOUS WITH TRACHEOSTOMY DEVICE, PERCUTANEOUS APPROACH: ICD-10-PCS | Performed by: INTERNAL MEDICINE

## 2021-01-01 PROCEDURE — 77030018836 HC SOL IRR NACL ICUM -A

## 2021-01-01 PROCEDURE — 65270000029 HC RM PRIVATE

## 2021-01-01 PROCEDURE — 74011250636 HC RX REV CODE- 250/636: Performed by: NURSE PRACTITIONER

## 2021-01-01 PROCEDURE — 77030039425 HC BLD LARYNG TRULITE DISP TELE -A: Performed by: NURSE ANESTHETIST, CERTIFIED REGISTERED

## 2021-01-01 PROCEDURE — 74011000258 HC RX REV CODE- 258: Performed by: HOSPITALIST

## 2021-01-01 PROCEDURE — U0003 INFECTIOUS AGENT DETECTION BY NUCLEIC ACID (DNA OR RNA); SEVERE ACUTE RESPIRATORY SYNDROME CORONAVIRUS 2 (SARS-COV-2) (CORONAVIRUS DISEASE [COVID-19]), AMPLIFIED PROBE TECHNIQUE, MAKING USE OF HIGH THROUGHPUT TECHNOLOGIES AS DESCRIBED BY CMS-2020-01-R: HCPCS

## 2021-01-01 PROCEDURE — 82550 ASSAY OF CK (CPK): CPT

## 2021-01-01 PROCEDURE — 82570 ASSAY OF URINE CREATININE: CPT

## 2021-01-01 PROCEDURE — 74011000258 HC RX REV CODE- 258: Performed by: NURSE PRACTITIONER

## 2021-01-01 PROCEDURE — 36430 TRANSFUSION BLD/BLD COMPNT: CPT

## 2021-01-01 PROCEDURE — 36415 COLL VENOUS BLD VENIPUNCTURE: CPT

## 2021-01-01 PROCEDURE — 94640 AIRWAY INHALATION TREATMENT: CPT

## 2021-01-01 PROCEDURE — 80053 COMPREHEN METABOLIC PANEL: CPT

## 2021-01-01 PROCEDURE — 74011636637 HC RX REV CODE- 636/637: Performed by: NURSE PRACTITIONER

## 2021-01-01 PROCEDURE — 90945 DIALYSIS ONE EVALUATION: CPT

## 2021-01-01 PROCEDURE — 97535 SELF CARE MNGMENT TRAINING: CPT

## 2021-01-01 PROCEDURE — 71250 CT THORAX DX C-: CPT

## 2021-01-01 PROCEDURE — 51702 INSERT TEMP BLADDER CATH: CPT

## 2021-01-01 PROCEDURE — 82962 GLUCOSE BLOOD TEST: CPT

## 2021-01-01 PROCEDURE — 84100 ASSAY OF PHOSPHORUS: CPT

## 2021-01-01 PROCEDURE — 83930 ASSAY OF BLOOD OSMOLALITY: CPT

## 2021-01-01 PROCEDURE — 74011250636 HC RX REV CODE- 250/636: Performed by: FAMILY MEDICINE

## 2021-01-01 PROCEDURE — 2709999900 HC NON-CHARGEABLE SUPPLY

## 2021-01-01 PROCEDURE — 99233 SBSQ HOSP IP/OBS HIGH 50: CPT | Performed by: INTERNAL MEDICINE

## 2021-01-01 PROCEDURE — 74011000258 HC RX REV CODE- 258: Performed by: EMERGENCY MEDICINE

## 2021-01-01 PROCEDURE — BT40ZZZ ULTRASONOGRAPHY OF BLADDER: ICD-10-PCS | Performed by: INTERNAL MEDICINE

## 2021-01-01 PROCEDURE — 87040 BLOOD CULTURE FOR BACTERIA: CPT

## 2021-01-01 PROCEDURE — 99232 SBSQ HOSP IP/OBS MODERATE 35: CPT | Performed by: INTERNAL MEDICINE

## 2021-01-01 PROCEDURE — 99291 CRITICAL CARE FIRST HOUR: CPT | Performed by: INTERNAL MEDICINE

## 2021-01-01 PROCEDURE — 93005 ELECTROCARDIOGRAM TRACING: CPT | Performed by: EMERGENCY MEDICINE

## 2021-01-01 PROCEDURE — P9047 ALBUMIN (HUMAN), 25%, 50ML: HCPCS | Performed by: HOSPITALIST

## 2021-01-01 PROCEDURE — 97165 OT EVAL LOW COMPLEX 30 MIN: CPT

## 2021-01-01 PROCEDURE — 80069 RENAL FUNCTION PANEL: CPT

## 2021-01-01 PROCEDURE — 87150 DNA/RNA AMPLIFIED PROBE: CPT

## 2021-01-01 PROCEDURE — 77030009038 HC CATH BILI STN RTVR BSC -C: Performed by: INTERNAL MEDICINE

## 2021-01-01 PROCEDURE — 76450000000

## 2021-01-01 PROCEDURE — 77030031476 HC EXCH HEAT MOISTW FLTR HALY -A

## 2021-01-01 PROCEDURE — 77030038269 HC DRN EXT URIN PURWCK BARD -A

## 2021-01-01 PROCEDURE — 74011000250 HC RX REV CODE- 250

## 2021-01-01 PROCEDURE — 74011000636 HC RX REV CODE- 636: Performed by: INTERNAL MEDICINE

## 2021-01-01 PROCEDURE — 74011250637 HC RX REV CODE- 250/637

## 2021-01-01 PROCEDURE — 94760 N-INVAS EAR/PLS OXIMETRY 1: CPT

## 2021-01-01 PROCEDURE — 97166 OT EVAL MOD COMPLEX 45 MIN: CPT

## 2021-01-01 PROCEDURE — 81015 MICROSCOPIC EXAM OF URINE: CPT

## 2021-01-01 PROCEDURE — 87070 CULTURE OTHR SPECIMN AEROBIC: CPT

## 2021-01-01 PROCEDURE — C9113 INJ PANTOPRAZOLE SODIUM, VIA: HCPCS | Performed by: NURSE PRACTITIONER

## 2021-01-01 PROCEDURE — C1752 CATH,HEMODIALYSIS,SHORT-TERM: HCPCS

## 2021-01-01 PROCEDURE — 80202 ASSAY OF VANCOMYCIN: CPT

## 2021-01-01 PROCEDURE — 83550 IRON BINDING TEST: CPT

## 2021-01-01 PROCEDURE — 87186 SC STD MICRODIL/AGAR DIL: CPT

## 2021-01-01 PROCEDURE — 83036 HEMOGLOBIN GLYCOSYLATED A1C: CPT

## 2021-01-01 PROCEDURE — 77030006998

## 2021-01-01 PROCEDURE — 99232 SBSQ HOSP IP/OBS MODERATE 35: CPT | Performed by: PSYCHIATRY & NEUROLOGY

## 2021-01-01 PROCEDURE — 74011250637 HC RX REV CODE- 250/637: Performed by: HOSPITALIST

## 2021-01-01 PROCEDURE — 85018 HEMOGLOBIN: CPT

## 2021-01-01 PROCEDURE — 87086 URINE CULTURE/COLONY COUNT: CPT

## 2021-01-01 PROCEDURE — 97161 PT EVAL LOW COMPLEX 20 MIN: CPT

## 2021-01-01 PROCEDURE — 2709999900 HC NON-CHARGEABLE SUPPLY: Performed by: INTERNAL MEDICINE

## 2021-01-01 PROCEDURE — 84132 ASSAY OF SERUM POTASSIUM: CPT

## 2021-01-01 PROCEDURE — 85520 HEPARIN ASSAY: CPT

## 2021-01-01 PROCEDURE — 36592 COLLECT BLOOD FROM PICC: CPT

## 2021-01-01 PROCEDURE — 77010033711 HC HIGH FLOW OXYGEN

## 2021-01-01 PROCEDURE — 84439 ASSAY OF FREE THYROXINE: CPT

## 2021-01-01 PROCEDURE — 99222 1ST HOSP IP/OBS MODERATE 55: CPT | Performed by: INTERNAL MEDICINE

## 2021-01-01 PROCEDURE — 81003 URINALYSIS AUTO W/O SCOPE: CPT

## 2021-01-01 PROCEDURE — 0BJ08ZZ INSPECTION OF TRACHEOBRONCHIAL TREE, VIA NATURAL OR ARTIFICIAL OPENING ENDOSCOPIC: ICD-10-PCS | Performed by: INTERNAL MEDICINE

## 2021-01-01 PROCEDURE — 0DH63UZ INSERTION OF FEEDING DEVICE INTO STOMACH, PERCUTANEOUS APPROACH: ICD-10-PCS | Performed by: INTERNAL MEDICINE

## 2021-01-01 PROCEDURE — 97530 THERAPEUTIC ACTIVITIES: CPT

## 2021-01-01 PROCEDURE — 99233 SBSQ HOSP IP/OBS HIGH 50: CPT | Performed by: NURSE PRACTITIONER

## 2021-01-01 PROCEDURE — 74011250637 HC RX REV CODE- 250/637: Performed by: EMERGENCY MEDICINE

## 2021-01-01 PROCEDURE — 65620000000 HC RM CCU GENERAL

## 2021-01-01 PROCEDURE — 80076 HEPATIC FUNCTION PANEL: CPT

## 2021-01-01 PROCEDURE — 31600 PLANNED TRACHEOSTOMY: CPT | Performed by: INTERNAL MEDICINE

## 2021-01-01 PROCEDURE — 99223 1ST HOSP IP/OBS HIGH 75: CPT | Performed by: INTERNAL MEDICINE

## 2021-01-01 PROCEDURE — 84484 ASSAY OF TROPONIN QUANT: CPT

## 2021-01-01 PROCEDURE — 87635 SARS-COV-2 COVID-19 AMP PRB: CPT

## 2021-01-01 PROCEDURE — 99285 EMERGENCY DEPT VISIT HI MDM: CPT

## 2021-01-01 PROCEDURE — 74011250636 HC RX REV CODE- 250/636: Performed by: NURSE ANESTHETIST, CERTIFIED REGISTERED

## 2021-01-01 PROCEDURE — 85610 PROTHROMBIN TIME: CPT

## 2021-01-01 PROCEDURE — 74011000250 HC RX REV CODE- 250: Performed by: EMERGENCY MEDICINE

## 2021-01-01 PROCEDURE — 74230 X-RAY XM SWLNG FUNCJ C+: CPT

## 2021-01-01 PROCEDURE — 74011250636 HC RX REV CODE- 250/636: Performed by: EMERGENCY MEDICINE

## 2021-01-01 PROCEDURE — 97112 NEUROMUSCULAR REEDUCATION: CPT

## 2021-01-01 PROCEDURE — 95816 EEG AWAKE AND DROWSY: CPT | Performed by: PSYCHIATRY & NEUROLOGY

## 2021-01-01 PROCEDURE — 81001 URINALYSIS AUTO W/SCOPE: CPT

## 2021-01-01 PROCEDURE — 65610000001 HC ROOM ICU GENERAL

## 2021-01-01 PROCEDURE — 83090 ASSAY OF HOMOCYSTEINE: CPT

## 2021-01-01 PROCEDURE — 96361 HYDRATE IV INFUSION ADD-ON: CPT

## 2021-01-01 PROCEDURE — APPSS30 APP SPLIT SHARED TIME 16-30 MINUTES: Performed by: NURSE PRACTITIONER

## 2021-01-01 PROCEDURE — 96374 THER/PROPH/DIAG INJ IV PUSH: CPT

## 2021-01-01 PROCEDURE — 0F798ZZ DILATION OF COMMON BILE DUCT, VIA NATURAL OR ARTIFICIAL OPENING ENDOSCOPIC: ICD-10-PCS | Performed by: INTERNAL MEDICINE

## 2021-01-01 PROCEDURE — 82140 ASSAY OF AMMONIA: CPT

## 2021-01-01 PROCEDURE — 65660000004 HC RM CVT STEPDOWN

## 2021-01-01 PROCEDURE — 86900 BLOOD TYPING SEROLOGIC ABO: CPT

## 2021-01-01 PROCEDURE — 86923 COMPATIBILITY TEST ELECTRIC: CPT

## 2021-01-01 PROCEDURE — 74011000250 HC RX REV CODE- 250: Performed by: NURSE ANESTHETIST, CERTIFIED REGISTERED

## 2021-01-01 PROCEDURE — 76040000026: Performed by: INTERNAL MEDICINE

## 2021-01-01 PROCEDURE — 74011250636 HC RX REV CODE- 250/636

## 2021-01-01 PROCEDURE — 83605 ASSAY OF LACTIC ACID: CPT

## 2021-01-01 PROCEDURE — 74011000258 HC RX REV CODE- 258: Performed by: FAMILY MEDICINE

## 2021-01-01 PROCEDURE — 77030040393 HC DRSG OPTIFOAM GENT MDII -B

## 2021-01-01 PROCEDURE — 76060000031 HC ANESTHESIA FIRST 0.5 HR: Performed by: INTERNAL MEDICINE

## 2021-01-01 PROCEDURE — 74330 X-RAY BILE/PANC ENDOSCOPY: CPT

## 2021-01-01 PROCEDURE — 87088 URINE BACTERIA CULTURE: CPT

## 2021-01-01 PROCEDURE — 94762 N-INVAS EAR/PLS OXIMTRY CONT: CPT

## 2021-01-01 PROCEDURE — P9047 ALBUMIN (HUMAN), 25%, 50ML: HCPCS | Performed by: INTERNAL MEDICINE

## 2021-01-01 PROCEDURE — 31622 DX BRONCHOSCOPE/WASH: CPT | Performed by: INTERNAL MEDICINE

## 2021-01-01 PROCEDURE — C9113 INJ PANTOPRAZOLE SODIUM, VIA: HCPCS | Performed by: INTERNAL MEDICINE

## 2021-01-01 PROCEDURE — 77030018719 HC DRSG PTCH ANTIMIC J&J -A

## 2021-01-01 PROCEDURE — 51701 INSERT BLADDER CATHETER: CPT

## 2021-01-01 PROCEDURE — 77030012699 HC VLV SUC CNTRL OCOA -A: Performed by: INTERNAL MEDICINE

## 2021-01-01 PROCEDURE — 82728 ASSAY OF FERRITIN: CPT

## 2021-01-01 PROCEDURE — 83935 ASSAY OF URINE OSMOLALITY: CPT

## 2021-01-01 PROCEDURE — BF101ZZ FLUOROSCOPY OF BILE DUCTS USING LOW OSMOLAR CONTRAST: ICD-10-PCS | Performed by: INTERNAL MEDICINE

## 2021-01-01 PROCEDURE — 85730 THROMBOPLASTIN TIME PARTIAL: CPT

## 2021-01-01 PROCEDURE — 92610 EVALUATE SWALLOWING FUNCTION: CPT

## 2021-01-01 PROCEDURE — 74011250636 HC RX REV CODE- 250/636: Performed by: PHYSICIAN ASSISTANT

## 2021-01-01 PROCEDURE — APPSS45 APP SPLIT SHARED TIME 31-45 MINUTES: Performed by: NURSE PRACTITIONER

## 2021-01-01 PROCEDURE — 80074 ACUTE HEPATITIS PANEL: CPT

## 2021-01-01 PROCEDURE — 74011250637 HC RX REV CODE- 250/637: Performed by: FAMILY MEDICINE

## 2021-01-01 PROCEDURE — 0DJ08ZZ INSPECTION OF UPPER INTESTINAL TRACT, VIA NATURAL OR ARTIFICIAL OPENING ENDOSCOPIC: ICD-10-PCS | Performed by: INTERNAL MEDICINE

## 2021-01-01 PROCEDURE — 97162 PT EVAL MOD COMPLEX 30 MIN: CPT

## 2021-01-01 PROCEDURE — 87077 CULTURE AEROBIC IDENTIFY: CPT

## 2021-01-01 PROCEDURE — 70551 MRI BRAIN STEM W/O DYE: CPT

## 2021-01-01 PROCEDURE — 99233 SBSQ HOSP IP/OBS HIGH 50: CPT | Performed by: PSYCHIATRY & NEUROLOGY

## 2021-01-01 PROCEDURE — 83880 ASSAY OF NATRIURETIC PEPTIDE: CPT

## 2021-01-01 PROCEDURE — 76937 US GUIDE VASCULAR ACCESS: CPT

## 2021-01-01 PROCEDURE — 85379 FIBRIN DEGRADATION QUANT: CPT

## 2021-01-01 PROCEDURE — 87899 AGENT NOS ASSAY W/OPTIC: CPT

## 2021-01-01 PROCEDURE — 51798 US URINE CAPACITY MEASURE: CPT

## 2021-01-01 PROCEDURE — 83690 ASSAY OF LIPASE: CPT

## 2021-01-01 PROCEDURE — 76060000032 HC ANESTHESIA 0.5 TO 1 HR: Performed by: INTERNAL MEDICINE

## 2021-01-01 PROCEDURE — 82607 VITAMIN B-12: CPT

## 2021-01-01 PROCEDURE — 74018 RADEX ABDOMEN 1 VIEW: CPT

## 2021-01-01 PROCEDURE — 74183 MRI ABD W/O CNTR FLWD CNTR: CPT

## 2021-01-01 PROCEDURE — 76040000025: Performed by: INTERNAL MEDICINE

## 2021-01-01 PROCEDURE — B4083 ENTERAL STOMACH TUBE LEVINE: HCPCS

## 2021-01-01 PROCEDURE — 74011636637 HC RX REV CODE- 636/637: Performed by: EMERGENCY MEDICINE

## 2021-01-01 PROCEDURE — 94002 VENT MGMT INPAT INIT DAY: CPT

## 2021-01-01 PROCEDURE — 76705 ECHO EXAM OF ABDOMEN: CPT

## 2021-01-01 PROCEDURE — 74011250636 HC RX REV CODE- 250/636: Performed by: ANESTHESIOLOGY

## 2021-01-01 PROCEDURE — 77030037088 HC TUBE ENDOTRACH ORAL NSL COVD-A: Performed by: NURSE ANESTHETIST, CERTIFIED REGISTERED

## 2021-01-01 PROCEDURE — 84133 ASSAY OF URINE POTASSIUM: CPT

## 2021-01-01 PROCEDURE — 74011250636 HC RX REV CODE- 250/636: Performed by: STUDENT IN AN ORGANIZED HEALTH CARE EDUCATION/TRAINING PROGRAM

## 2021-01-01 PROCEDURE — 70490 CT SOFT TISSUE NECK W/O DYE: CPT

## 2021-01-01 PROCEDURE — 92597 ORAL SPEECH DEVICE EVAL: CPT

## 2021-01-01 PROCEDURE — 83921 ORGANIC ACID SINGLE QUANT: CPT

## 2021-01-01 PROCEDURE — 77030041247 HC PROTECTOR HEEL HEELMEDIX MDII -B

## 2021-01-01 PROCEDURE — 5A1D90Z PERFORMANCE OF URINARY FILTRATION, CONTINUOUS, GREATER THAN 18 HOURS PER DAY: ICD-10-PCS | Performed by: NURSE PRACTITIONER

## 2021-01-01 PROCEDURE — 97167 OT EVAL HIGH COMPLEX 60 MIN: CPT

## 2021-01-01 PROCEDURE — P9016 RBC LEUKOCYTES REDUCED: HCPCS

## 2021-01-01 PROCEDURE — 93005 ELECTROCARDIOGRAM TRACING: CPT

## 2021-01-01 PROCEDURE — 36556 INSERT NON-TUNNEL CV CATH: CPT

## 2021-01-01 PROCEDURE — 87076 CULTURE ANAEROBE IDENT EACH: CPT

## 2021-01-01 PROCEDURE — 5A09357 ASSISTANCE WITH RESPIRATORY VENTILATION, LESS THAN 24 CONSECUTIVE HOURS, CONTINUOUS POSITIVE AIRWAY PRESSURE: ICD-10-PCS | Performed by: HOSPITALIST

## 2021-01-01 PROCEDURE — 84145 PROCALCITONIN (PCT): CPT

## 2021-01-01 PROCEDURE — 5A1955Z RESPIRATORY VENTILATION, GREATER THAN 96 CONSECUTIVE HOURS: ICD-10-PCS | Performed by: INTERNAL MEDICINE

## 2021-01-01 PROCEDURE — 77030012595 HC SPHNTOM BILI BSC -D: Performed by: INTERNAL MEDICINE

## 2021-01-01 PROCEDURE — 74011000250 HC RX REV CODE- 250: Performed by: PHYSICIAN ASSISTANT

## 2021-01-01 PROCEDURE — A9576 INJ PROHANCE MULTIPACK: HCPCS | Performed by: FAMILY MEDICINE

## 2021-01-01 PROCEDURE — 74011636320 HC RX REV CODE- 636/320: Performed by: FAMILY MEDICINE

## 2021-01-01 PROCEDURE — APPNB30 APP NON BILLABLE TIME 0-30 MINS: Performed by: NURSE PRACTITIONER

## 2021-01-01 PROCEDURE — 86901 BLOOD TYPING SEROLOGIC RH(D): CPT

## 2021-01-01 PROCEDURE — 92611 MOTION FLUOROSCOPY/SWALLOW: CPT

## 2021-01-01 PROCEDURE — 0T9B70Z DRAINAGE OF BLADDER WITH DRAINAGE DEVICE, VIA NATURAL OR ARTIFICIAL OPENING: ICD-10-PCS | Performed by: EMERGENCY MEDICINE

## 2021-01-01 PROCEDURE — 31502 CHANGE OF WINDPIPE AIRWAY: CPT

## 2021-01-01 PROCEDURE — 89220 SPUTUM SPECIMEN COLLECTION: CPT

## 2021-01-01 PROCEDURE — 30233N1 TRANSFUSION OF NONAUTOLOGOUS RED BLOOD CELLS INTO PERIPHERAL VEIN, PERCUTANEOUS APPROACH: ICD-10-PCS | Performed by: INTERNAL MEDICINE

## 2021-01-01 PROCEDURE — 87324 CLOSTRIDIUM AG IA: CPT

## 2021-01-01 PROCEDURE — 82272 OCCULT BLD FECES 1-3 TESTS: CPT

## 2021-01-01 PROCEDURE — 99222 1ST HOSP IP/OBS MODERATE 55: CPT | Performed by: STUDENT IN AN ORGANIZED HEALTH CARE EDUCATION/TRAINING PROGRAM

## 2021-01-01 PROCEDURE — 84443 ASSAY THYROID STIM HORMONE: CPT

## 2021-01-01 PROCEDURE — P9040 RBC LEUKOREDUCED IRRADIATED: HCPCS

## 2021-01-01 PROCEDURE — 77030040830 HC CATH URETH FOL MDII -A

## 2021-01-01 PROCEDURE — 77030005122 HC CATH GASTMY PEG BSC -B: Performed by: INTERNAL MEDICINE

## 2021-01-01 PROCEDURE — 96375 TX/PRO/DX INJ NEW DRUG ADDON: CPT

## 2021-01-01 PROCEDURE — 94664 DEMO&/EVAL PT USE INHALER: CPT

## 2021-01-01 PROCEDURE — 86022 PLATELET ANTIBODIES: CPT

## 2021-01-01 PROCEDURE — 77030008969: Performed by: INTERNAL MEDICINE

## 2021-01-01 PROCEDURE — 96360 HYDRATION IV INFUSION INIT: CPT

## 2021-01-01 PROCEDURE — 87106 FUNGI IDENTIFICATION YEAST: CPT

## 2021-01-01 PROCEDURE — C1894 INTRO/SHEATH, NON-LASER: HCPCS

## 2021-01-01 PROCEDURE — 93306 TTE W/DOPPLER COMPLETE: CPT

## 2021-01-01 PROCEDURE — 31575 DIAGNOSTIC LARYNGOSCOPY: CPT | Performed by: STUDENT IN AN ORGANIZED HEALTH CARE EDUCATION/TRAINING PROGRAM

## 2021-01-01 PROCEDURE — 93005 ELECTROCARDIOGRAM TRACING: CPT | Performed by: INTERNAL MEDICINE

## 2021-01-01 PROCEDURE — 70450 CT HEAD/BRAIN W/O DYE: CPT

## 2021-01-01 PROCEDURE — 87185 SC STD ENZYME DETCJ PER NZM: CPT

## 2021-01-01 PROCEDURE — 74011636637 HC RX REV CODE- 636/637: Performed by: HOSPITALIST

## 2021-01-01 PROCEDURE — 77030008771 HC TU NG SALEM SUMP -A

## 2021-01-01 PROCEDURE — 84300 ASSAY OF URINE SODIUM: CPT

## 2021-01-01 PROCEDURE — 97116 GAIT TRAINING THERAPY: CPT

## 2021-01-01 PROCEDURE — 02H633Z INSERTION OF INFUSION DEVICE INTO RIGHT ATRIUM, PERCUTANEOUS APPROACH: ICD-10-PCS | Performed by: RADIOLOGY

## 2021-01-01 PROCEDURE — 96365 THER/PROPH/DIAG IV INF INIT: CPT

## 2021-01-01 PROCEDURE — 77030027138 HC INCENT SPIROMETER -A

## 2021-01-01 PROCEDURE — 74011000250 HC RX REV CODE- 250: Performed by: HOSPITALIST

## 2021-01-01 PROCEDURE — 74011000250 HC RX REV CODE- 250: Performed by: STUDENT IN AN ORGANIZED HEALTH CARE EDUCATION/TRAINING PROGRAM

## 2021-01-01 PROCEDURE — 87340 HEPATITIS B SURFACE AG IA: CPT

## 2021-01-01 PROCEDURE — 99221 1ST HOSP IP/OBS SF/LOW 40: CPT | Performed by: INTERNAL MEDICINE

## 2021-01-01 PROCEDURE — 82746 ASSAY OF FOLIC ACID SERUM: CPT

## 2021-01-01 PROCEDURE — 82947 ASSAY GLUCOSE BLOOD QUANT: CPT

## 2021-01-01 PROCEDURE — 77030002916 HC SUT ETHLN J&J -A

## 2021-01-01 PROCEDURE — 77030040361 HC SLV COMPR DVT MDII -B

## 2021-01-01 PROCEDURE — 99497 ADVNCD CARE PLAN 30 MIN: CPT | Performed by: NURSE PRACTITIONER

## 2021-01-01 PROCEDURE — 97163 PT EVAL HIGH COMPLEX 45 MIN: CPT

## 2021-01-01 PROCEDURE — 92526 ORAL FUNCTION THERAPY: CPT

## 2021-01-01 PROCEDURE — 93970 EXTREMITY STUDY: CPT

## 2021-01-01 PROCEDURE — 77030007288 HC DEV LOK BILI BSC -A: Performed by: INTERNAL MEDICINE

## 2021-01-01 RX ORDER — SODIUM CHLORIDE 0.9 % (FLUSH) 0.9 %
5-40 SYRINGE (ML) INJECTION EVERY 8 HOURS
Status: DISCONTINUED | OUTPATIENT
Start: 2021-01-01 | End: 2021-01-01 | Stop reason: HOSPADM

## 2021-01-01 RX ORDER — ACETAZOLAMIDE 250 MG/1
500 TABLET ORAL 2 TIMES DAILY
Status: COMPLETED | OUTPATIENT
Start: 2021-01-01 | End: 2021-01-01

## 2021-01-01 RX ORDER — LABETALOL HYDROCHLORIDE 5 MG/ML
10 INJECTION, SOLUTION INTRAVENOUS
Status: DISCONTINUED | OUTPATIENT
Start: 2021-01-01 | End: 2021-01-01 | Stop reason: HOSPADM

## 2021-01-01 RX ORDER — DOXYCYCLINE 100 MG/1
100 CAPSULE ORAL EVERY 12 HOURS
Status: DISCONTINUED | OUTPATIENT
Start: 2021-01-01 | End: 2021-01-01 | Stop reason: HOSPADM

## 2021-01-01 RX ORDER — HYDRALAZINE HYDROCHLORIDE 20 MG/ML
10 INJECTION INTRAMUSCULAR; INTRAVENOUS
Status: DISCONTINUED | OUTPATIENT
Start: 2021-01-01 | End: 2021-01-01

## 2021-01-01 RX ORDER — SODIUM CHLORIDE 9 MG/ML
250 INJECTION, SOLUTION INTRAVENOUS AS NEEDED
Status: DISCONTINUED | OUTPATIENT
Start: 2021-01-01 | End: 2021-01-01 | Stop reason: SDUPTHER

## 2021-01-01 RX ORDER — OXYCODONE HYDROCHLORIDE 5 MG/1
10 TABLET ORAL EVERY 6 HOURS
Status: COMPLETED | OUTPATIENT
Start: 2021-01-01 | End: 2021-01-01

## 2021-01-01 RX ORDER — FUROSEMIDE 10 MG/ML
20 INJECTION INTRAMUSCULAR; INTRAVENOUS EVERY 12 HOURS
Status: DISCONTINUED | OUTPATIENT
Start: 2021-01-01 | End: 2021-01-01

## 2021-01-01 RX ORDER — FUROSEMIDE 10 MG/ML
80 INJECTION INTRAMUSCULAR; INTRAVENOUS ONCE
Status: COMPLETED | OUTPATIENT
Start: 2021-01-01 | End: 2021-01-01

## 2021-01-01 RX ORDER — DEXTROSE MONOHYDRATE 50 MG/ML
25 INJECTION, SOLUTION INTRAVENOUS CONTINUOUS
Status: DISCONTINUED | OUTPATIENT
Start: 2021-01-01 | End: 2021-01-01

## 2021-01-01 RX ORDER — ACETAMINOPHEN 650 MG/1
650 SUPPOSITORY RECTAL
Status: DISCONTINUED | OUTPATIENT
Start: 2021-01-01 | End: 2021-01-01 | Stop reason: HOSPADM

## 2021-01-01 RX ORDER — ONDANSETRON 8 MG/1
4 TABLET, ORALLY DISINTEGRATING ORAL
Status: DISCONTINUED | OUTPATIENT
Start: 2021-01-01 | End: 2021-01-01 | Stop reason: HOSPADM

## 2021-01-01 RX ORDER — VANCOMYCIN HYDROCHLORIDE 1 G/20ML
3.38 INJECTION, POWDER, LYOPHILIZED, FOR SOLUTION INTRAVENOUS EVERY 12 HOURS
Status: DISCONTINUED | OUTPATIENT
Start: 2021-01-01 | End: 2021-01-01 | Stop reason: DRUGHIGH

## 2021-01-01 RX ORDER — METOPROLOL TARTRATE 25 MG/1
25 TABLET, FILM COATED ORAL EVERY 12 HOURS
Status: DISCONTINUED | OUTPATIENT
Start: 2021-01-01 | End: 2021-01-01

## 2021-01-01 RX ORDER — INSULIN LISPRO 100 [IU]/ML
INJECTION, SOLUTION INTRAVENOUS; SUBCUTANEOUS
Status: DISCONTINUED | OUTPATIENT
Start: 2021-01-01 | End: 2021-01-01 | Stop reason: HOSPADM

## 2021-01-01 RX ORDER — SODIUM CHLORIDE 9 MG/ML
75 INJECTION, SOLUTION INTRAVENOUS CONTINUOUS
Status: DISPENSED | OUTPATIENT
Start: 2021-01-01 | End: 2021-01-01

## 2021-01-01 RX ORDER — INSULIN GLARGINE 100 [IU]/ML
12 INJECTION, SOLUTION SUBCUTANEOUS DAILY
Status: DISCONTINUED | OUTPATIENT
Start: 2021-01-01 | End: 2021-01-01 | Stop reason: HOSPADM

## 2021-01-01 RX ORDER — FUROSEMIDE 10 MG/ML
20 INJECTION INTRAMUSCULAR; INTRAVENOUS DAILY
Status: DISCONTINUED | OUTPATIENT
Start: 2021-01-01 | End: 2021-01-01 | Stop reason: HOSPADM

## 2021-01-01 RX ORDER — PANTOPRAZOLE SODIUM 40 MG/1
40 TABLET, DELAYED RELEASE ORAL
Status: DISCONTINUED | OUTPATIENT
Start: 2021-01-01 | End: 2021-01-01 | Stop reason: HOSPADM

## 2021-01-01 RX ORDER — HEPARIN SODIUM 5000 [USP'U]/ML
5000 INJECTION, SOLUTION INTRAVENOUS; SUBCUTANEOUS EVERY 8 HOURS
Status: DISCONTINUED | OUTPATIENT
Start: 2021-01-01 | End: 2021-01-01 | Stop reason: HOSPADM

## 2021-01-01 RX ORDER — RISPERIDONE 1 MG/1
0.5 TABLET, FILM COATED ORAL 2 TIMES DAILY
Status: DISCONTINUED | OUTPATIENT
Start: 2021-01-01 | End: 2021-01-01

## 2021-01-01 RX ORDER — PROPOFOL 10 MG/ML
INJECTION, EMULSION INTRAVENOUS AS NEEDED
Status: DISCONTINUED | OUTPATIENT
Start: 2021-01-01 | End: 2021-01-01 | Stop reason: HOSPADM

## 2021-01-01 RX ORDER — ALBUMIN HUMAN 250 G/1000ML
12.5 SOLUTION INTRAVENOUS 2 TIMES DAILY
Status: DISCONTINUED | OUTPATIENT
Start: 2021-01-01 | End: 2021-01-01

## 2021-01-01 RX ORDER — FENTANYL CITRATE-0.9 % NACL/PF 25 MCG/ML
0-200 PLASTIC BAG, INJECTION (ML) INJECTION
Status: DISCONTINUED | OUTPATIENT
Start: 2021-01-01 | End: 2021-01-01

## 2021-01-01 RX ORDER — OXYCODONE HYDROCHLORIDE 5 MG/1
5 TABLET ORAL
Status: DISCONTINUED | OUTPATIENT
Start: 2021-01-01 | End: 2021-01-01

## 2021-01-01 RX ORDER — ONDANSETRON 2 MG/ML
4 INJECTION INTRAMUSCULAR; INTRAVENOUS
Status: DISCONTINUED | OUTPATIENT
Start: 2021-01-01 | End: 2021-01-01

## 2021-01-01 RX ORDER — LORAZEPAM 2 MG/ML
2 INJECTION INTRAMUSCULAR
Status: DISCONTINUED | OUTPATIENT
Start: 2021-01-01 | End: 2021-07-16 | Stop reason: HOSPADM

## 2021-01-01 RX ORDER — FUROSEMIDE 40 MG/1
40 TABLET ORAL DAILY
Status: DISCONTINUED | OUTPATIENT
Start: 2021-01-01 | End: 2021-01-01

## 2021-01-01 RX ORDER — HYDROMORPHONE HYDROCHLORIDE 1 MG/ML
1 INJECTION, SOLUTION INTRAMUSCULAR; INTRAVENOUS; SUBCUTANEOUS
Status: DISCONTINUED | OUTPATIENT
Start: 2021-01-01 | End: 2021-01-01

## 2021-01-01 RX ORDER — PROPOFOL 10 MG/ML
INJECTION, EMULSION INTRAVENOUS
Status: DISCONTINUED | OUTPATIENT
Start: 2021-01-01 | End: 2021-01-01 | Stop reason: HOSPADM

## 2021-01-01 RX ORDER — PROPOFOL 10 MG/ML
0-50 INJECTION, EMULSION INTRAVENOUS
Status: DISCONTINUED | OUTPATIENT
Start: 2021-01-01 | End: 2021-01-01

## 2021-01-01 RX ORDER — INSULIN GLARGINE 100 [IU]/ML
15 INJECTION, SOLUTION SUBCUTANEOUS 2 TIMES DAILY
Status: DISCONTINUED | OUTPATIENT
Start: 2021-01-01 | End: 2021-01-01 | Stop reason: HOSPADM

## 2021-01-01 RX ORDER — ONDANSETRON 2 MG/ML
4 INJECTION INTRAMUSCULAR; INTRAVENOUS
Status: DISCONTINUED | OUTPATIENT
Start: 2021-01-01 | End: 2021-01-01 | Stop reason: HOSPADM

## 2021-01-01 RX ORDER — PREDNISONE 20 MG/1
40 TABLET ORAL
Qty: 10 TABLET | Refills: 0 | Status: SHIPPED | OUTPATIENT
Start: 2021-01-01 | End: 2021-01-01

## 2021-01-01 RX ORDER — LANOLIN ALCOHOL/MO/W.PET/CERES
1 CREAM (GRAM) TOPICAL EVERY OTHER DAY
Status: DISCONTINUED | OUTPATIENT
Start: 2021-01-01 | End: 2021-01-01 | Stop reason: HOSPADM

## 2021-01-01 RX ORDER — FENTANYL CITRATE 50 UG/ML
50 INJECTION, SOLUTION INTRAMUSCULAR; INTRAVENOUS
Status: DISCONTINUED | OUTPATIENT
Start: 2021-01-01 | End: 2021-01-01

## 2021-01-01 RX ORDER — ROPINIROLE 1 MG/1
0.5 TABLET, FILM COATED ORAL
Status: DISCONTINUED | OUTPATIENT
Start: 2021-01-01 | End: 2021-01-01 | Stop reason: HOSPADM

## 2021-01-01 RX ORDER — FENTANYL CITRATE 50 UG/ML
100 INJECTION, SOLUTION INTRAMUSCULAR; INTRAVENOUS ONCE
Status: COMPLETED | OUTPATIENT
Start: 2021-01-01 | End: 2021-01-01

## 2021-01-01 RX ORDER — DEXTROSE 50 % IN WATER (D50W) INTRAVENOUS SYRINGE
25-50 AS NEEDED
Status: DISCONTINUED | OUTPATIENT
Start: 2021-01-01 | End: 2021-01-01 | Stop reason: HOSPADM

## 2021-01-01 RX ORDER — SODIUM CHLORIDE 450 MG/100ML
50 INJECTION, SOLUTION INTRAVENOUS CONTINUOUS
Status: DISCONTINUED | OUTPATIENT
Start: 2021-01-01 | End: 2021-01-01

## 2021-01-01 RX ORDER — FUROSEMIDE 10 MG/ML
40 INJECTION INTRAMUSCULAR; INTRAVENOUS ONCE
Status: COMPLETED | OUTPATIENT
Start: 2021-01-01 | End: 2021-01-01

## 2021-01-01 RX ORDER — CLOPIDOGREL BISULFATE 75 MG/1
75 TABLET ORAL DAILY
Status: DISCONTINUED | OUTPATIENT
Start: 2021-01-01 | End: 2021-01-01 | Stop reason: HOSPADM

## 2021-01-01 RX ORDER — SODIUM CHLORIDE 9 MG/ML
250 INJECTION, SOLUTION INTRAVENOUS AS NEEDED
Status: DISCONTINUED | OUTPATIENT
Start: 2021-01-01 | End: 2021-01-01

## 2021-01-01 RX ORDER — EPHEDRINE SULFATE/0.9% NACL/PF 50 MG/5 ML
SYRINGE (ML) INTRAVENOUS AS NEEDED
Status: DISCONTINUED | OUTPATIENT
Start: 2021-01-01 | End: 2021-01-01 | Stop reason: HOSPADM

## 2021-01-01 RX ORDER — VANCOMYCIN 2 GRAM/500 ML IN 0.9 % SODIUM CHLORIDE INTRAVENOUS
2000 ONCE
Status: COMPLETED | OUTPATIENT
Start: 2021-01-01 | End: 2021-01-01

## 2021-01-01 RX ORDER — OXYCODONE HYDROCHLORIDE 5 MG/1
10 TABLET ORAL EVERY 8 HOURS
Status: DISCONTINUED | OUTPATIENT
Start: 2021-01-01 | End: 2021-01-01

## 2021-01-01 RX ORDER — DEXTROSE 50 % IN WATER (D50W) INTRAVENOUS SYRINGE
25
Status: COMPLETED | OUTPATIENT
Start: 2021-01-01 | End: 2021-01-01

## 2021-01-01 RX ORDER — SODIUM CHLORIDE 9 MG/ML
125 INJECTION, SOLUTION INTRAVENOUS CONTINUOUS
Status: DISCONTINUED | OUTPATIENT
Start: 2021-01-01 | End: 2021-01-01

## 2021-01-01 RX ORDER — RISPERIDONE 1 MG/ML
1 SOLUTION ORAL EVERY 12 HOURS
Status: DISCONTINUED | OUTPATIENT
Start: 2021-01-01 | End: 2021-01-01

## 2021-01-01 RX ORDER — POLYETHYLENE GLYCOL 3350 17 G/17G
17 POWDER, FOR SOLUTION ORAL DAILY PRN
Status: DISCONTINUED | OUTPATIENT
Start: 2021-01-01 | End: 2021-01-01 | Stop reason: HOSPADM

## 2021-01-01 RX ORDER — FUROSEMIDE 40 MG/1
80 TABLET ORAL DAILY
Status: DISCONTINUED | OUTPATIENT
Start: 2021-01-01 | End: 2021-01-01

## 2021-01-01 RX ORDER — OXYCODONE HYDROCHLORIDE 5 MG/1
10 TABLET ORAL EVERY 6 HOURS
Status: DISCONTINUED | OUTPATIENT
Start: 2021-01-01 | End: 2021-01-01

## 2021-01-01 RX ORDER — SODIUM CHLORIDE 0.9 % (FLUSH) 0.9 %
5-40 SYRINGE (ML) INJECTION AS NEEDED
Status: DISCONTINUED | OUTPATIENT
Start: 2021-01-01 | End: 2021-01-01 | Stop reason: HOSPADM

## 2021-01-01 RX ORDER — HEPARIN SODIUM 5000 [USP'U]/ML
80 INJECTION, SOLUTION INTRAVENOUS; SUBCUTANEOUS ONCE
Status: COMPLETED | OUTPATIENT
Start: 2021-01-01 | End: 2021-01-01

## 2021-01-01 RX ORDER — IPRATROPIUM BROMIDE AND ALBUTEROL SULFATE 2.5; .5 MG/3ML; MG/3ML
3 SOLUTION RESPIRATORY (INHALATION)
Status: DISCONTINUED | OUTPATIENT
Start: 2021-01-01 | End: 2021-01-01 | Stop reason: HOSPADM

## 2021-01-01 RX ORDER — INSULIN LISPRO 100 [IU]/ML
10 INJECTION, SOLUTION INTRAVENOUS; SUBCUTANEOUS ONCE
Status: COMPLETED | OUTPATIENT
Start: 2021-01-01 | End: 2021-01-01

## 2021-01-01 RX ORDER — FUROSEMIDE 10 MG/ML
80 INJECTION INTRAMUSCULAR; INTRAVENOUS DAILY
Status: DISCONTINUED | OUTPATIENT
Start: 2021-01-01 | End: 2021-01-01

## 2021-01-01 RX ORDER — DEXTROSE 50 % IN WATER (D50W) INTRAVENOUS SYRINGE
25-50 AS NEEDED
Status: DISCONTINUED | OUTPATIENT
Start: 2021-01-01 | End: 2021-01-01

## 2021-01-01 RX ORDER — SODIUM CHLORIDE, SODIUM LACTATE, POTASSIUM CHLORIDE, CALCIUM CHLORIDE 600; 310; 30; 20 MG/100ML; MG/100ML; MG/100ML; MG/100ML
INJECTION, SOLUTION INTRAVENOUS
Status: DISCONTINUED | OUTPATIENT
Start: 2021-01-01 | End: 2021-01-01 | Stop reason: HOSPADM

## 2021-01-01 RX ORDER — DEXTROSE MONOHYDRATE 50 MG/ML
50 INJECTION, SOLUTION INTRAVENOUS CONTINUOUS
Status: DISCONTINUED | OUTPATIENT
Start: 2021-01-01 | End: 2021-01-01 | Stop reason: ALTCHOICE

## 2021-01-01 RX ORDER — CEFPODOXIME PROXETIL 200 MG/1
200 TABLET, FILM COATED ORAL EVERY 24 HOURS
Status: COMPLETED | OUTPATIENT
Start: 2021-01-01 | End: 2021-01-01

## 2021-01-01 RX ORDER — DOPAMINE HYDROCHLORIDE 320 MG/100ML
0-20 INJECTION, SOLUTION INTRAVENOUS
Status: DISCONTINUED | OUTPATIENT
Start: 2021-01-01 | End: 2021-01-01

## 2021-01-01 RX ORDER — DEXAMETHASONE SODIUM PHOSPHATE 100 MG/10ML
10 INJECTION INTRAMUSCULAR; INTRAVENOUS
Status: COMPLETED | OUTPATIENT
Start: 2021-01-01 | End: 2021-01-01

## 2021-01-01 RX ORDER — SODIUM CHLORIDE 0.9 % (FLUSH) 0.9 %
5-40 SYRINGE (ML) INJECTION EVERY 8 HOURS
Status: DISCONTINUED | OUTPATIENT
Start: 2021-01-01 | End: 2021-01-01

## 2021-01-01 RX ORDER — ACETAMINOPHEN 325 MG/1
650 TABLET ORAL
Status: DISCONTINUED | OUTPATIENT
Start: 2021-01-01 | End: 2021-01-01 | Stop reason: HOSPADM

## 2021-01-01 RX ORDER — INSULIN GLARGINE 100 [IU]/ML
30 INJECTION, SOLUTION SUBCUTANEOUS DAILY
Status: DISCONTINUED | OUTPATIENT
Start: 2021-01-01 | End: 2021-01-01

## 2021-01-01 RX ORDER — OXYCODONE HYDROCHLORIDE 5 MG/1
5 TABLET ORAL
Status: DISCONTINUED | OUTPATIENT
Start: 2021-01-01 | End: 2021-01-01 | Stop reason: HOSPADM

## 2021-01-01 RX ORDER — SODIUM CHLORIDE 0.9 % (FLUSH) 0.9 %
5-10 SYRINGE (ML) INJECTION AS NEEDED
Status: DISCONTINUED | OUTPATIENT
Start: 2021-01-01 | End: 2021-01-01 | Stop reason: HOSPADM

## 2021-01-01 RX ORDER — OXYCODONE HYDROCHLORIDE 5 MG/1
10 TABLET ORAL EVERY 12 HOURS
Status: DISCONTINUED | OUTPATIENT
Start: 2021-01-01 | End: 2021-01-01

## 2021-01-01 RX ORDER — FUROSEMIDE 10 MG/ML
40 INJECTION INTRAMUSCULAR; INTRAVENOUS EVERY 12 HOURS
Status: DISCONTINUED | OUTPATIENT
Start: 2021-01-01 | End: 2021-01-01

## 2021-01-01 RX ORDER — DOCUSATE SODIUM 50 MG/5ML
100 LIQUID ORAL DAILY PRN
Status: DISCONTINUED | OUTPATIENT
Start: 2021-01-01 | End: 2021-01-01 | Stop reason: HOSPADM

## 2021-01-01 RX ORDER — ROSUVASTATIN CALCIUM 20 MG/1
20 TABLET, COATED ORAL
Status: DISCONTINUED | OUTPATIENT
Start: 2021-01-01 | End: 2021-01-01

## 2021-01-01 RX ORDER — AMOXICILLIN 250 MG
1 CAPSULE ORAL 2 TIMES DAILY
Status: DISCONTINUED | OUTPATIENT
Start: 2021-01-01 | End: 2021-01-01 | Stop reason: HOSPADM

## 2021-01-01 RX ORDER — FACIAL-BODY WIPES
10 EACH TOPICAL DAILY PRN
Status: DISCONTINUED | OUTPATIENT
Start: 2021-01-01 | End: 2021-01-01 | Stop reason: HOSPADM

## 2021-01-01 RX ORDER — AMOXICILLIN 250 MG
2 CAPSULE ORAL
Status: COMPLETED | OUTPATIENT
Start: 2021-01-01 | End: 2021-01-01

## 2021-01-01 RX ORDER — PROPOFOL 10 MG/ML
0-50 VIAL (ML) INTRAVENOUS
Status: DISCONTINUED | OUTPATIENT
Start: 2021-01-01 | End: 2021-01-01

## 2021-01-01 RX ORDER — DEXAMETHASONE SODIUM PHOSPHATE 4 MG/ML
4 INJECTION, SOLUTION INTRA-ARTICULAR; INTRALESIONAL; INTRAMUSCULAR; INTRAVENOUS; SOFT TISSUE EVERY 24 HOURS
Status: DISCONTINUED | OUTPATIENT
Start: 2021-01-01 | End: 2021-01-01

## 2021-01-01 RX ORDER — ROCURONIUM BROMIDE 10 MG/ML
80 INJECTION, SOLUTION INTRAVENOUS
Status: COMPLETED | OUTPATIENT
Start: 2021-01-01 | End: 2021-01-01

## 2021-01-01 RX ORDER — LORAZEPAM 2 MG/ML
0.5 INJECTION INTRAMUSCULAR
Status: DISCONTINUED | OUTPATIENT
Start: 2021-01-01 | End: 2021-07-16 | Stop reason: HOSPADM

## 2021-01-01 RX ORDER — PROMETHAZINE HYDROCHLORIDE 25 MG/1
12.5 TABLET ORAL
Status: DISCONTINUED | OUTPATIENT
Start: 2021-01-01 | End: 2021-01-01 | Stop reason: HOSPADM

## 2021-01-01 RX ORDER — FACIAL-BODY WIPES
10 EACH TOPICAL DAILY
Status: DISCONTINUED | OUTPATIENT
Start: 2021-01-01 | End: 2021-01-01

## 2021-01-01 RX ORDER — NOREPINEPHRINE BITARTRATE/D5W 4MG/250ML
.5-3 PLASTIC BAG, INJECTION (ML) INTRAVENOUS
Status: DISCONTINUED | OUTPATIENT
Start: 2021-01-01 | End: 2021-01-01

## 2021-01-01 RX ORDER — SODIUM BICARBONATE 1 MEQ/ML
50 SYRINGE (ML) INTRAVENOUS ONCE
Status: COMPLETED | OUTPATIENT
Start: 2021-01-01 | End: 2021-01-01

## 2021-01-01 RX ORDER — DEXTROSE 40 %
15 GEL (GRAM) ORAL AS NEEDED
Status: DISCONTINUED | OUTPATIENT
Start: 2021-01-01 | End: 2021-01-01

## 2021-01-01 RX ORDER — MORPHINE SULFATE 4 MG/ML
4 INJECTION INTRAVENOUS ONCE
Status: COMPLETED | OUTPATIENT
Start: 2021-01-01 | End: 2021-01-01

## 2021-01-01 RX ORDER — FUROSEMIDE 10 MG/ML
40 INJECTION INTRAMUSCULAR; INTRAVENOUS
Status: COMPLETED | OUTPATIENT
Start: 2021-01-01 | End: 2021-01-01

## 2021-01-01 RX ORDER — RISPERIDONE 1 MG/ML
0.5 SOLUTION ORAL EVERY 12 HOURS
Status: DISCONTINUED | OUTPATIENT
Start: 2021-01-01 | End: 2021-01-01

## 2021-01-01 RX ORDER — RISPERIDONE 1 MG/ML
1 SOLUTION ORAL 2 TIMES DAILY
Status: DISCONTINUED | OUTPATIENT
Start: 2021-01-01 | End: 2021-01-01 | Stop reason: SDUPTHER

## 2021-01-01 RX ORDER — METOPROLOL TARTRATE 25 MG/1
25 TABLET, FILM COATED ORAL EVERY 12 HOURS
Status: DISCONTINUED | OUTPATIENT
Start: 2021-01-01 | End: 2021-01-01 | Stop reason: HOSPADM

## 2021-01-01 RX ORDER — RISPERIDONE 1 MG/1
0.5 TABLET, FILM COATED ORAL
Status: DISCONTINUED | OUTPATIENT
Start: 2021-01-01 | End: 2021-01-01

## 2021-01-01 RX ORDER — LOSARTAN POTASSIUM 50 MG/1
100 TABLET ORAL DAILY
Status: DISCONTINUED | OUTPATIENT
Start: 2021-01-01 | End: 2021-01-01 | Stop reason: HOSPADM

## 2021-01-01 RX ORDER — FACIAL-BODY WIPES
10 EACH TOPICAL DAILY
Status: COMPLETED | OUTPATIENT
Start: 2021-01-01 | End: 2021-01-01

## 2021-01-01 RX ORDER — HYDRALAZINE HYDROCHLORIDE 20 MG/ML
10 INJECTION INTRAMUSCULAR; INTRAVENOUS
Status: DISCONTINUED | OUTPATIENT
Start: 2021-01-01 | End: 2021-01-01 | Stop reason: HOSPADM

## 2021-01-01 RX ORDER — FUROSEMIDE 10 MG/ML
80 INJECTION INTRAMUSCULAR; INTRAVENOUS EVERY 12 HOURS
Status: DISCONTINUED | OUTPATIENT
Start: 2021-01-01 | End: 2021-01-01

## 2021-01-01 RX ORDER — GABAPENTIN 100 MG/1
100 CAPSULE ORAL 2 TIMES DAILY
Status: DISCONTINUED | OUTPATIENT
Start: 2021-01-01 | End: 2021-01-01 | Stop reason: HOSPADM

## 2021-01-01 RX ORDER — TRAZODONE HYDROCHLORIDE 50 MG/1
50 TABLET ORAL
Status: DISCONTINUED | OUTPATIENT
Start: 2021-01-01 | End: 2021-01-01 | Stop reason: HOSPADM

## 2021-01-01 RX ORDER — MORPHINE SULFATE 2 MG/ML
2 INJECTION, SOLUTION INTRAMUSCULAR; INTRAVENOUS
Status: DISCONTINUED | OUTPATIENT
Start: 2021-01-01 | End: 2021-01-01

## 2021-01-01 RX ORDER — ACETAZOLAMIDE 500 MG/1
500 CAPSULE, EXTENDED RELEASE ORAL EVERY 12 HOURS
Status: DISCONTINUED | OUTPATIENT
Start: 2021-01-01 | End: 2021-01-01

## 2021-01-01 RX ORDER — MAGNESIUM SULFATE HEPTAHYDRATE 40 MG/ML
2 INJECTION, SOLUTION INTRAVENOUS
Status: COMPLETED | OUTPATIENT
Start: 2021-01-01 | End: 2021-01-01

## 2021-01-01 RX ORDER — ALBUTEROL SULFATE 0.83 MG/ML
2.5 SOLUTION RESPIRATORY (INHALATION)
Status: DISCONTINUED | OUTPATIENT
Start: 2021-01-01 | End: 2021-07-16 | Stop reason: HOSPADM

## 2021-01-01 RX ORDER — MORPHINE SULFATE 2 MG/ML
2 INJECTION, SOLUTION INTRAMUSCULAR; INTRAVENOUS
Status: COMPLETED | OUTPATIENT
Start: 2021-01-01 | End: 2021-01-01

## 2021-01-01 RX ORDER — DEXAMETHASONE SODIUM PHOSPHATE 100 MG/10ML
6 INJECTION INTRAMUSCULAR; INTRAVENOUS EVERY 24 HOURS
Status: DISCONTINUED | OUTPATIENT
Start: 2021-01-01 | End: 2021-01-01

## 2021-01-01 RX ORDER — FUROSEMIDE 10 MG/ML
40 INJECTION INTRAMUSCULAR; INTRAVENOUS DAILY
Status: DISCONTINUED | OUTPATIENT
Start: 2021-01-01 | End: 2021-01-01

## 2021-01-01 RX ORDER — ALBUTEROL SULFATE 0.83 MG/ML
SOLUTION RESPIRATORY (INHALATION)
Status: ACTIVE
Start: 2021-01-01 | End: 2021-01-01

## 2021-01-01 RX ORDER — DEXMEDETOMIDINE HYDROCHLORIDE 4 UG/ML
.1-1.5 INJECTION, SOLUTION INTRAVENOUS
Status: DISCONTINUED | OUTPATIENT
Start: 2021-01-01 | End: 2021-01-01

## 2021-01-01 RX ORDER — POTASSIUM CHLORIDE 14.9 MG/ML
20 INJECTION INTRAVENOUS ONCE
Status: COMPLETED | OUTPATIENT
Start: 2021-01-01 | End: 2021-01-01

## 2021-01-01 RX ORDER — DEXAMETHASONE SODIUM PHOSPHATE 4 MG/ML
INJECTION, SOLUTION INTRA-ARTICULAR; INTRALESIONAL; INTRAMUSCULAR; INTRAVENOUS; SOFT TISSUE AS NEEDED
Status: DISCONTINUED | OUTPATIENT
Start: 2021-01-01 | End: 2021-01-01 | Stop reason: HOSPADM

## 2021-01-01 RX ORDER — ONDANSETRON 2 MG/ML
INJECTION INTRAMUSCULAR; INTRAVENOUS AS NEEDED
Status: DISCONTINUED | OUTPATIENT
Start: 2021-01-01 | End: 2021-01-01 | Stop reason: HOSPADM

## 2021-01-01 RX ORDER — CEFPODOXIME PROXETIL 200 MG/1
200 TABLET, FILM COATED ORAL EVERY 12 HOURS
Status: DISCONTINUED | OUTPATIENT
Start: 2021-01-01 | End: 2021-01-01

## 2021-01-01 RX ORDER — INSULIN GLARGINE 100 [IU]/ML
15 INJECTION, SOLUTION SUBCUTANEOUS DAILY
Status: DISCONTINUED | OUTPATIENT
Start: 2021-01-01 | End: 2021-01-01

## 2021-01-01 RX ORDER — PROPOFOL 10 MG/ML
INJECTION, EMULSION INTRAVENOUS
Status: COMPLETED
Start: 2021-01-01 | End: 2021-01-01

## 2021-01-01 RX ORDER — SODIUM CHLORIDE 0.9 % (FLUSH) 0.9 %
10 SYRINGE (ML) INJECTION
Status: COMPLETED | OUTPATIENT
Start: 2021-01-01 | End: 2021-01-01

## 2021-01-01 RX ORDER — METOPROLOL SUCCINATE 25 MG/1
12.5 TABLET, EXTENDED RELEASE ORAL DAILY
Qty: 30 TAB | Refills: 0 | Status: SHIPPED
Start: 2021-01-01

## 2021-01-01 RX ORDER — CEFAZOLIN SODIUM/WATER 2 G/20 ML
2 SYRINGE (ML) INTRAVENOUS
Status: DISCONTINUED | OUTPATIENT
Start: 2021-01-01 | End: 2021-01-01

## 2021-01-01 RX ORDER — RISPERIDONE 1 MG/ML
0.5 SOLUTION ORAL
Status: DISCONTINUED | OUTPATIENT
Start: 2021-01-01 | End: 2021-01-01

## 2021-01-01 RX ORDER — TRAMADOL HYDROCHLORIDE 50 MG/1
50 TABLET ORAL
Status: DISCONTINUED | OUTPATIENT
Start: 2021-01-01 | End: 2021-01-01

## 2021-01-01 RX ORDER — INSULIN GLARGINE 100 [IU]/ML
30 INJECTION, SOLUTION SUBCUTANEOUS
Status: DISCONTINUED | OUTPATIENT
Start: 2021-01-01 | End: 2021-01-01

## 2021-01-01 RX ORDER — INSULIN GLARGINE 100 [IU]/ML
20 INJECTION, SOLUTION SUBCUTANEOUS DAILY
Status: DISCONTINUED | OUTPATIENT
Start: 2021-01-01 | End: 2021-01-01

## 2021-01-01 RX ORDER — SAME BUTANEDISULFONATE/BETAINE 400-600 MG
250 POWDER IN PACKET (EA) ORAL 2 TIMES DAILY
Status: DISCONTINUED | OUTPATIENT
Start: 2021-01-01 | End: 2021-01-01

## 2021-01-01 RX ORDER — LOSARTAN POTASSIUM 50 MG/1
100 TABLET ORAL DAILY
Status: DISCONTINUED | OUTPATIENT
Start: 2021-01-01 | End: 2021-01-01

## 2021-01-01 RX ORDER — DEXTROSE MONOHYDRATE 50 MG/ML
50 INJECTION, SOLUTION INTRAVENOUS CONTINUOUS
Status: DISCONTINUED | OUTPATIENT
Start: 2021-01-01 | End: 2021-01-01

## 2021-01-01 RX ORDER — INSULIN GLARGINE 100 [IU]/ML
5 INJECTION, SOLUTION SUBCUTANEOUS DAILY
Status: DISCONTINUED | OUTPATIENT
Start: 2021-01-01 | End: 2021-01-01

## 2021-01-01 RX ORDER — OXYCODONE HYDROCHLORIDE 5 MG/1
15 TABLET ORAL EVERY 6 HOURS
Status: DISCONTINUED | OUTPATIENT
Start: 2021-01-01 | End: 2021-01-01

## 2021-01-01 RX ORDER — FENTANYL CITRATE 50 UG/ML
INJECTION, SOLUTION INTRAMUSCULAR; INTRAVENOUS AS NEEDED
Status: DISCONTINUED | OUTPATIENT
Start: 2021-01-01 | End: 2021-01-01 | Stop reason: HOSPADM

## 2021-01-01 RX ORDER — INSULIN GLARGINE 100 [IU]/ML
10 INJECTION, SOLUTION SUBCUTANEOUS ONCE
Status: COMPLETED | OUTPATIENT
Start: 2021-01-01 | End: 2021-01-01

## 2021-01-01 RX ORDER — HYDROMORPHONE HYDROCHLORIDE 1 MG/ML
2 INJECTION, SOLUTION INTRAMUSCULAR; INTRAVENOUS; SUBCUTANEOUS
Status: DISCONTINUED | OUTPATIENT
Start: 2021-01-01 | End: 2021-01-01

## 2021-01-01 RX ORDER — LORAZEPAM 2 MG/ML
2 INJECTION INTRAMUSCULAR
Status: DISCONTINUED | OUTPATIENT
Start: 2021-01-01 | End: 2021-01-01

## 2021-01-01 RX ORDER — ASPIRIN 81 MG/1
81 TABLET ORAL DAILY
Status: DISCONTINUED | OUTPATIENT
Start: 2021-01-01 | End: 2021-01-01 | Stop reason: HOSPADM

## 2021-01-01 RX ORDER — METOPROLOL SUCCINATE 25 MG/1
12.5 TABLET, EXTENDED RELEASE ORAL DAILY
Status: DISCONTINUED | OUTPATIENT
Start: 2021-01-01 | End: 2021-01-01 | Stop reason: HOSPADM

## 2021-01-01 RX ORDER — ROSUVASTATIN CALCIUM 20 MG/1
20 TABLET, COATED ORAL
Status: DISCONTINUED | OUTPATIENT
Start: 2021-01-01 | End: 2021-01-01 | Stop reason: HOSPADM

## 2021-01-01 RX ORDER — HYDRALAZINE HYDROCHLORIDE 20 MG/ML
20 INJECTION INTRAMUSCULAR; INTRAVENOUS ONCE
Status: DISCONTINUED | OUTPATIENT
Start: 2021-01-01 | End: 2021-01-01

## 2021-01-01 RX ORDER — SODIUM CHLORIDE FOR INHALATION 3 %
4 VIAL, NEBULIZER (ML) INHALATION 2 TIMES DAILY
Status: DISCONTINUED | OUTPATIENT
Start: 2021-01-01 | End: 2021-01-01

## 2021-01-01 RX ORDER — LINEZOLID 2 MG/ML
600 INJECTION, SOLUTION INTRAVENOUS EVERY 12 HOURS
Status: DISCONTINUED | OUTPATIENT
Start: 2021-01-01 | End: 2021-01-01

## 2021-01-01 RX ORDER — PSEUDOEPHED/ACETAMINOPHEN/CPM 30-500-2MG
2 TABLET ORAL EVERY 6 HOURS
Status: DISCONTINUED | OUTPATIENT
Start: 2021-01-01 | End: 2021-01-01

## 2021-01-01 RX ORDER — IPRATROPIUM BROMIDE AND ALBUTEROL SULFATE 2.5; .5 MG/3ML; MG/3ML
3 SOLUTION RESPIRATORY (INHALATION)
Qty: 15 NEBULE | Refills: 0 | Status: SHIPPED
Start: 2021-01-01

## 2021-01-01 RX ORDER — ASCORBIC ACID 500 MG
500 TABLET ORAL EVERY OTHER DAY
Status: DISCONTINUED | OUTPATIENT
Start: 2021-01-01 | End: 2021-01-01 | Stop reason: HOSPADM

## 2021-01-01 RX ORDER — LORAZEPAM 2 MG/ML
1 INJECTION INTRAMUSCULAR
Status: DISCONTINUED | OUTPATIENT
Start: 2021-01-01 | End: 2021-01-01

## 2021-01-01 RX ORDER — GUANFACINE HYDROCHLORIDE 1 MG/1
1 TABLET ORAL 2 TIMES DAILY
Status: DISCONTINUED | OUTPATIENT
Start: 2021-01-01 | End: 2021-01-01

## 2021-01-01 RX ORDER — INSULIN GLARGINE 100 [IU]/ML
20 INJECTION, SOLUTION SUBCUTANEOUS DAILY
Status: DISCONTINUED | OUTPATIENT
Start: 2021-01-01 | End: 2021-01-01 | Stop reason: HOSPADM

## 2021-01-01 RX ORDER — AZITHROMYCIN 500 MG/1
500 TABLET, FILM COATED ORAL DAILY
Qty: 4 TAB | Refills: 0 | Status: SHIPPED | OUTPATIENT
Start: 2021-01-01 | End: 2021-01-01

## 2021-01-01 RX ORDER — LANOLIN ALCOHOL/MO/W.PET/CERES
CREAM (GRAM) TOPICAL EVERY OTHER DAY
COMMUNITY

## 2021-01-01 RX ORDER — SODIUM CHLORIDE 9 MG/ML
75 INJECTION, SOLUTION INTRAVENOUS CONTINUOUS
Status: DISCONTINUED | OUTPATIENT
Start: 2021-01-01 | End: 2021-01-01

## 2021-01-01 RX ORDER — POLYETHYLENE GLYCOL 3350 17 G/17G
17 POWDER, FOR SOLUTION ORAL DAILY
Status: DISCONTINUED | OUTPATIENT
Start: 2021-01-01 | End: 2021-01-01 | Stop reason: HOSPADM

## 2021-01-01 RX ORDER — METOPROLOL TARTRATE 25 MG/1
12.5 TABLET, FILM COATED ORAL EVERY 12 HOURS
Status: DISCONTINUED | OUTPATIENT
Start: 2021-01-01 | End: 2021-01-01 | Stop reason: HOSPADM

## 2021-01-01 RX ORDER — METOPROLOL TARTRATE 25 MG/1
12.5 TABLET, FILM COATED ORAL 2 TIMES DAILY
Status: DISCONTINUED | OUTPATIENT
Start: 2021-01-01 | End: 2021-01-01

## 2021-01-01 RX ORDER — LIDOCAINE HYDROCHLORIDE 20 MG/ML
INJECTION, SOLUTION EPIDURAL; INFILTRATION; INTRACAUDAL; PERINEURAL AS NEEDED
Status: DISCONTINUED | OUTPATIENT
Start: 2021-01-01 | End: 2021-01-01 | Stop reason: HOSPADM

## 2021-01-01 RX ORDER — INSULIN LISPRO 100 [IU]/ML
0-10 INJECTION, SOLUTION INTRAVENOUS; SUBCUTANEOUS
Qty: 1 VIAL | Refills: 0 | Status: SHIPPED
Start: 2021-01-01 | End: 2021-01-01 | Stop reason: SDUPTHER

## 2021-01-01 RX ORDER — VANCOMYCIN HYDROCHLORIDE
1250 ONCE
Status: DISCONTINUED | OUTPATIENT
Start: 2021-01-01 | End: 2021-01-01

## 2021-01-01 RX ORDER — GABAPENTIN 100 MG/1
100 CAPSULE ORAL 3 TIMES DAILY
Status: DISCONTINUED | OUTPATIENT
Start: 2021-01-01 | End: 2021-01-01 | Stop reason: HOSPADM

## 2021-01-01 RX ORDER — FUROSEMIDE 20 MG/1
20 TABLET ORAL EVERY OTHER DAY
Qty: 15 TABLET | Refills: 0 | Status: SHIPPED
Start: 2021-01-01

## 2021-01-01 RX ORDER — SCOLOPAMINE TRANSDERMAL SYSTEM 1 MG/1
1 PATCH, EXTENDED RELEASE TRANSDERMAL
Status: DISCONTINUED | OUTPATIENT
Start: 2021-01-01 | End: 2021-01-01

## 2021-01-01 RX ORDER — DEXTROSE 50 % IN WATER (D50W) INTRAVENOUS SYRINGE
50
Status: COMPLETED | OUTPATIENT
Start: 2021-01-01 | End: 2021-01-01

## 2021-01-01 RX ORDER — SPIRONOLACTONE 25 MG/1
12.5 TABLET ORAL DAILY
Status: DISCONTINUED | OUTPATIENT
Start: 2021-01-01 | End: 2021-01-01 | Stop reason: HOSPADM

## 2021-01-01 RX ORDER — FENTANYL CITRATE-0.9 % NACL/PF 25 MCG/ML
0-1.5 PLASTIC BAG, INJECTION (ML) INJECTION
Status: DISCONTINUED | OUTPATIENT
Start: 2021-01-01 | End: 2021-01-01

## 2021-01-01 RX ORDER — ENOXAPARIN SODIUM 100 MG/ML
30 INJECTION SUBCUTANEOUS DAILY
Status: DISCONTINUED | OUTPATIENT
Start: 2021-01-01 | End: 2021-01-01

## 2021-01-01 RX ORDER — POLYETHYLENE GLYCOL 3350 17 G/17G
17 POWDER, FOR SOLUTION ORAL DAILY PRN
Status: DISCONTINUED | OUTPATIENT
Start: 2021-01-01 | End: 2021-01-01

## 2021-01-01 RX ORDER — METHADONE HYDROCHLORIDE 5 MG/1
10 TABLET ORAL 2 TIMES DAILY
Status: DISCONTINUED | OUTPATIENT
Start: 2021-01-01 | End: 2021-01-01

## 2021-01-01 RX ORDER — PSEUDOEPHED/ACETAMINOPHEN/CPM 30-500-2MG
2 TABLET ORAL
Status: DISCONTINUED | OUTPATIENT
Start: 2021-01-01 | End: 2021-01-01 | Stop reason: HOSPADM

## 2021-01-01 RX ORDER — SODIUM CHLORIDE 0.9 % (FLUSH) 0.9 %
5-10 SYRINGE (ML) INJECTION EVERY 8 HOURS
Status: DISCONTINUED | OUTPATIENT
Start: 2021-01-01 | End: 2021-01-01 | Stop reason: HOSPADM

## 2021-01-01 RX ORDER — INSULIN GLARGINE 100 [IU]/ML
10 INJECTION, SOLUTION SUBCUTANEOUS
Status: DISCONTINUED | OUTPATIENT
Start: 2021-01-01 | End: 2021-01-01

## 2021-01-01 RX ORDER — DOCUSATE SODIUM 50 MG/5ML
100 LIQUID ORAL DAILY PRN
Status: DISCONTINUED | OUTPATIENT
Start: 2021-01-01 | End: 2021-01-01

## 2021-01-01 RX ORDER — MORPHINE SULFATE 2 MG/ML
2-4 INJECTION, SOLUTION INTRAMUSCULAR; INTRAVENOUS
Status: DISCONTINUED | OUTPATIENT
Start: 2021-01-01 | End: 2021-07-16 | Stop reason: HOSPADM

## 2021-01-01 RX ORDER — INSULIN LISPRO 100 [IU]/ML
7 INJECTION, SOLUTION INTRAVENOUS; SUBCUTANEOUS
Status: DISCONTINUED | OUTPATIENT
Start: 2021-01-01 | End: 2021-01-01 | Stop reason: HOSPADM

## 2021-01-01 RX ORDER — FAMOTIDINE 20 MG/1
20 TABLET, FILM COATED ORAL DAILY
Status: DISCONTINUED | OUTPATIENT
Start: 2021-01-01 | End: 2021-01-01

## 2021-01-01 RX ORDER — SAME BUTANEDISULFONATE/BETAINE 400-600 MG
250 POWDER IN PACKET (EA) ORAL 2 TIMES DAILY
Status: DISCONTINUED | OUTPATIENT
Start: 2021-01-01 | End: 2021-01-01 | Stop reason: HOSPADM

## 2021-01-01 RX ORDER — SODIUM CHLORIDE 0.9 % (FLUSH) 0.9 %
5-40 SYRINGE (ML) INJECTION AS NEEDED
Status: DISCONTINUED | OUTPATIENT
Start: 2021-01-01 | End: 2021-01-01

## 2021-01-01 RX ORDER — PHENOL/SODIUM PHENOLATE
20 AEROSOL, SPRAY (ML) MUCOUS MEMBRANE DAILY
COMMUNITY

## 2021-01-01 RX ORDER — ENOXAPARIN SODIUM 100 MG/ML
40 INJECTION SUBCUTANEOUS EVERY 24 HOURS
Status: DISCONTINUED | OUTPATIENT
Start: 2021-01-01 | End: 2021-01-01

## 2021-01-01 RX ORDER — RISPERIDONE 1 MG/ML
0.5 SOLUTION ORAL 2 TIMES DAILY
Status: DISCONTINUED | OUTPATIENT
Start: 2021-01-01 | End: 2021-01-01

## 2021-01-01 RX ORDER — METOPROLOL TARTRATE 5 MG/5ML
5 INJECTION INTRAVENOUS ONCE
Status: COMPLETED | OUTPATIENT
Start: 2021-01-01 | End: 2021-01-01

## 2021-01-01 RX ORDER — HEPARIN SODIUM 5000 [USP'U]/ML
5000 INJECTION, SOLUTION INTRAVENOUS; SUBCUTANEOUS EVERY 12 HOURS
Status: DISCONTINUED | OUTPATIENT
Start: 2021-01-01 | End: 2021-01-01 | Stop reason: HOSPADM

## 2021-01-01 RX ORDER — TRAMADOL HYDROCHLORIDE 50 MG/1
50 TABLET ORAL
Status: DISCONTINUED | OUTPATIENT
Start: 2021-01-01 | End: 2021-01-01 | Stop reason: HOSPADM

## 2021-01-01 RX ORDER — INSULIN GLARGINE 100 [IU]/ML
10 INJECTION, SOLUTION SUBCUTANEOUS DAILY
Status: DISCONTINUED | OUTPATIENT
Start: 2021-01-01 | End: 2021-01-01

## 2021-01-01 RX ORDER — SODIUM POLYSTYRENE SULFONATE 15 G/60ML
15 SUSPENSION ORAL; RECTAL
Status: COMPLETED | OUTPATIENT
Start: 2021-01-01 | End: 2021-01-01

## 2021-01-01 RX ORDER — PSEUDOEPHED/ACETAMINOPHEN/CPM 30-500-2MG
2 TABLET ORAL
Status: DISCONTINUED | OUTPATIENT
Start: 2021-01-01 | End: 2021-01-01

## 2021-01-01 RX ORDER — INSULIN GLARGINE 100 [IU]/ML
20 INJECTION, SOLUTION SUBCUTANEOUS ONCE
Status: COMPLETED | OUTPATIENT
Start: 2021-01-01 | End: 2021-01-01

## 2021-01-01 RX ORDER — SODIUM CHLORIDE, SODIUM LACTATE, POTASSIUM CHLORIDE, CALCIUM CHLORIDE 600; 310; 30; 20 MG/100ML; MG/100ML; MG/100ML; MG/100ML
1000 INJECTION, SOLUTION INTRAVENOUS CONTINUOUS
Status: DISCONTINUED | OUTPATIENT
Start: 2021-01-01 | End: 2021-01-01 | Stop reason: HOSPADM

## 2021-01-01 RX ORDER — INSULIN GLARGINE 100 [IU]/ML
14 INJECTION, SOLUTION SUBCUTANEOUS DAILY
Status: DISCONTINUED | OUTPATIENT
Start: 2021-01-01 | End: 2021-01-01

## 2021-01-01 RX ORDER — MORPHINE SULFATE 2 MG/ML
2 INJECTION, SOLUTION INTRAMUSCULAR; INTRAVENOUS
Status: DISCONTINUED | OUTPATIENT
Start: 2021-01-01 | End: 2021-07-16 | Stop reason: HOSPADM

## 2021-01-01 RX ORDER — ONDANSETRON 4 MG/1
4 TABLET, ORALLY DISINTEGRATING ORAL
Status: DISCONTINUED | OUTPATIENT
Start: 2021-01-01 | End: 2021-01-01 | Stop reason: HOSPADM

## 2021-01-01 RX ORDER — INSULIN LISPRO 100 [IU]/ML
0-10 INJECTION, SOLUTION INTRAVENOUS; SUBCUTANEOUS
Qty: 1 VIAL | Refills: 0 | Status: SHIPPED
Start: 2021-01-01

## 2021-01-01 RX ORDER — INSULIN GLARGINE 100 [IU]/ML
8 INJECTION, SOLUTION SUBCUTANEOUS
Status: DISCONTINUED | OUTPATIENT
Start: 2021-01-01 | End: 2021-01-01

## 2021-01-01 RX ORDER — NYSTATIN 100000 [USP'U]/G
POWDER TOPICAL AS NEEDED
Status: DISCONTINUED | OUTPATIENT
Start: 2021-01-01 | End: 2021-01-01 | Stop reason: HOSPADM

## 2021-01-01 RX ORDER — LINEZOLID 600 MG/1
600 TABLET, FILM COATED ORAL EVERY 12 HOURS
Status: COMPLETED | OUTPATIENT
Start: 2021-01-01 | End: 2021-01-01

## 2021-01-01 RX ORDER — ALBUTEROL SULFATE 0.83 MG/ML
2.5 SOLUTION RESPIRATORY (INHALATION)
Status: DISCONTINUED | OUTPATIENT
Start: 2021-01-01 | End: 2021-01-01

## 2021-01-01 RX ORDER — HEPARIN SODIUM 1000 [USP'U]/ML
5000 INJECTION, SOLUTION INTRAVENOUS; SUBCUTANEOUS
Status: DISCONTINUED | OUTPATIENT
Start: 2021-01-01 | End: 2021-01-01 | Stop reason: HOSPADM

## 2021-01-01 RX ORDER — FENTANYL CITRATE 50 UG/ML
INJECTION, SOLUTION INTRAMUSCULAR; INTRAVENOUS
Status: ACTIVE
Start: 2021-01-01 | End: 2021-01-01

## 2021-01-01 RX ORDER — VANCOMYCIN HYDROCHLORIDE
1250 ONCE
Status: COMPLETED | OUTPATIENT
Start: 2021-01-01 | End: 2021-01-01

## 2021-01-01 RX ORDER — ROPINIROLE 0.5 MG/1
0.5 TABLET, FILM COATED ORAL 3 TIMES DAILY
Status: DISCONTINUED | OUTPATIENT
Start: 2021-01-01 | End: 2021-01-01 | Stop reason: HOSPADM

## 2021-01-01 RX ORDER — ENOXAPARIN SODIUM 100 MG/ML
40 INJECTION SUBCUTANEOUS DAILY
Status: DISCONTINUED | OUTPATIENT
Start: 2021-01-01 | End: 2021-01-01 | Stop reason: SDUPTHER

## 2021-01-01 RX ORDER — POLYETHYLENE GLYCOL 3350 17 G/17G
17 POWDER, FOR SOLUTION ORAL EVERY OTHER DAY
Status: DISCONTINUED | OUTPATIENT
Start: 2021-01-01 | End: 2021-01-01

## 2021-01-01 RX ORDER — DEXTROSE 40 %
15 GEL (GRAM) ORAL AS NEEDED
Status: DISCONTINUED | OUTPATIENT
Start: 2021-01-01 | End: 2021-01-01 | Stop reason: HOSPADM

## 2021-01-01 RX ORDER — SODIUM POLYSTYRENE SULFONATE 15 G/60ML
15 SUSPENSION ORAL; RECTAL
Status: DISPENSED | OUTPATIENT
Start: 2021-01-01 | End: 2021-01-01

## 2021-01-01 RX ORDER — MIRTAZAPINE 15 MG/1
15 TABLET, FILM COATED ORAL ONCE
Status: COMPLETED | OUTPATIENT
Start: 2021-01-01 | End: 2021-01-01

## 2021-01-01 RX ORDER — ASCORBIC ACID 500 MG
TABLET ORAL EVERY OTHER DAY
COMMUNITY

## 2021-01-01 RX ORDER — LINEZOLID 2 MG/ML
600 INJECTION, SOLUTION INTRAVENOUS EVERY 12 HOURS
Status: COMPLETED | OUTPATIENT
Start: 2021-01-01 | End: 2021-01-01

## 2021-01-01 RX ORDER — HEPARIN SODIUM 5000 [USP'U]/100ML
18-36 INJECTION, SOLUTION INTRAVENOUS
Status: DISCONTINUED | OUTPATIENT
Start: 2021-01-01 | End: 2021-01-01

## 2021-01-01 RX ORDER — ATORVASTATIN CALCIUM 40 MG/1
40 TABLET, FILM COATED ORAL
Status: DISCONTINUED | OUTPATIENT
Start: 2021-01-01 | End: 2021-01-01 | Stop reason: HOSPADM

## 2021-01-01 RX ORDER — INSULIN GLARGINE 100 [IU]/ML
40 INJECTION, SOLUTION SUBCUTANEOUS
Status: DISCONTINUED | OUTPATIENT
Start: 2021-01-01 | End: 2021-01-01

## 2021-01-01 RX ORDER — SUCCINYLCHOLINE CHLORIDE 20 MG/ML
INJECTION INTRAMUSCULAR; INTRAVENOUS AS NEEDED
Status: DISCONTINUED | OUTPATIENT
Start: 2021-01-01 | End: 2021-01-01 | Stop reason: HOSPADM

## 2021-01-01 RX ORDER — FAMOTIDINE 40 MG/5ML
20 POWDER, FOR SUSPENSION ORAL DAILY
Status: DISCONTINUED | OUTPATIENT
Start: 2021-01-01 | End: 2021-01-01

## 2021-01-01 RX ORDER — FUROSEMIDE 10 MG/ML
INJECTION INTRAMUSCULAR; INTRAVENOUS
Status: DISCONTINUED
Start: 2021-01-01 | End: 2021-01-01 | Stop reason: WASHOUT

## 2021-01-01 RX ORDER — OXYCODONE HYDROCHLORIDE 5 MG/1
10 TABLET ORAL
Status: DISCONTINUED | OUTPATIENT
Start: 2021-01-01 | End: 2021-01-01

## 2021-01-01 RX ORDER — HYDRALAZINE HYDROCHLORIDE 50 MG/1
50 TABLET, FILM COATED ORAL EVERY 6 HOURS
Status: DISCONTINUED | OUTPATIENT
Start: 2021-01-01 | End: 2021-01-01

## 2021-01-01 RX ORDER — ALBUTEROL SULFATE 0.83 MG/ML
2.5 SOLUTION RESPIRATORY (INHALATION)
Status: DISCONTINUED | OUTPATIENT
Start: 2021-01-01 | End: 2021-01-01 | Stop reason: HOSPADM

## 2021-01-01 RX ORDER — ADHESIVE BANDAGE
30 BANDAGE TOPICAL DAILY PRN
Status: DISCONTINUED | OUTPATIENT
Start: 2021-01-01 | End: 2021-01-01 | Stop reason: HOSPADM

## 2021-01-01 RX ORDER — FUROSEMIDE 40 MG/1
40 TABLET ORAL DAILY
Qty: 30 TAB | Refills: 0 | Status: SHIPPED | OUTPATIENT
Start: 2021-01-01 | End: 2021-01-01

## 2021-01-01 RX ORDER — FENTANYL 50 UG/1
1 PATCH TRANSDERMAL
Status: DISCONTINUED | OUTPATIENT
Start: 2021-01-01 | End: 2021-01-01

## 2021-01-01 RX ORDER — MIDAZOLAM IN 0.9 % SOD.CHLORID 1 MG/ML
0-10 PLASTIC BAG, INJECTION (ML) INTRAVENOUS
Status: DISCONTINUED | OUTPATIENT
Start: 2021-01-01 | End: 2021-01-01

## 2021-01-01 RX ORDER — INSULIN GLARGINE 100 [IU]/ML
25 INJECTION, SOLUTION SUBCUTANEOUS EVERY 12 HOURS
Status: DISCONTINUED | OUTPATIENT
Start: 2021-01-01 | End: 2021-01-01

## 2021-01-01 RX ORDER — FUROSEMIDE 20 MG/1
20 TABLET ORAL DAILY
Status: DISCONTINUED | OUTPATIENT
Start: 2021-01-01 | End: 2021-01-01 | Stop reason: HOSPADM

## 2021-01-01 RX ORDER — DOCUSATE SODIUM 50 MG/5ML
100 LIQUID ORAL DAILY
Status: DISCONTINUED | OUTPATIENT
Start: 2021-01-01 | End: 2021-01-01

## 2021-01-01 RX ORDER — DEXAMETHASONE SODIUM PHOSPHATE 100 MG/10ML
2 INJECTION INTRAMUSCULAR; INTRAVENOUS EVERY 24 HOURS
Status: COMPLETED | OUTPATIENT
Start: 2021-01-01 | End: 2021-01-01

## 2021-01-01 RX ORDER — INSULIN GLARGINE 100 [IU]/ML
20 INJECTION, SOLUTION SUBCUTANEOUS
Status: DISCONTINUED | OUTPATIENT
Start: 2021-01-01 | End: 2021-01-01

## 2021-01-01 RX ORDER — CEFPODOXIME PROXETIL 200 MG/1
200 TABLET, FILM COATED ORAL 2 TIMES DAILY
Qty: 8 TAB | Refills: 0 | Status: SHIPPED | OUTPATIENT
Start: 2021-01-01 | End: 2021-01-01

## 2021-01-01 RX ORDER — IPRATROPIUM BROMIDE AND ALBUTEROL SULFATE 2.5; .5 MG/3ML; MG/3ML
3 SOLUTION RESPIRATORY (INHALATION)
Status: DISPENSED | OUTPATIENT
Start: 2021-01-01 | End: 2021-01-01

## 2021-01-01 RX ORDER — LORAZEPAM 2 MG/ML
1 INJECTION INTRAMUSCULAR ONCE
Status: COMPLETED | OUTPATIENT
Start: 2021-01-01 | End: 2021-01-01

## 2021-01-01 RX ORDER — ALBUMIN HUMAN 250 G/1000ML
25 SOLUTION INTRAVENOUS EVERY 6 HOURS
Status: COMPLETED | OUTPATIENT
Start: 2021-01-01 | End: 2021-01-01

## 2021-01-01 RX ORDER — DICLOFENAC SODIUM 10 MG/G
2 GEL TOPICAL
Status: DISCONTINUED | OUTPATIENT
Start: 2021-01-01 | End: 2021-01-01 | Stop reason: HOSPADM

## 2021-01-01 RX ORDER — HEPARIN SODIUM 5000 [USP'U]/ML
5000 INJECTION, SOLUTION INTRAVENOUS; SUBCUTANEOUS EVERY 12 HOURS
Status: DISCONTINUED | OUTPATIENT
Start: 2021-01-01 | End: 2021-01-01

## 2021-01-01 RX ORDER — SODIUM CHLORIDE 9 MG/ML
75 INJECTION, SOLUTION INTRAVENOUS CONTINUOUS
Status: DISCONTINUED | OUTPATIENT
Start: 2021-01-01 | End: 2021-01-01 | Stop reason: HOSPADM

## 2021-01-01 RX ORDER — ACETAMINOPHEN 325 MG/1
650 TABLET ORAL
Status: DISCONTINUED | OUTPATIENT
Start: 2021-01-01 | End: 2021-01-01

## 2021-01-01 RX ORDER — RISPERIDONE 1 MG/ML
0.25 SOLUTION ORAL 2 TIMES DAILY
Status: DISCONTINUED | OUTPATIENT
Start: 2021-01-01 | End: 2021-01-01

## 2021-01-01 RX ORDER — LORAZEPAM 2 MG/ML
2 INJECTION INTRAMUSCULAR ONCE
Status: COMPLETED | OUTPATIENT
Start: 2021-01-01 | End: 2021-01-01

## 2021-01-01 RX ORDER — MIDAZOLAM HYDROCHLORIDE 1 MG/ML
5 INJECTION, SOLUTION INTRAMUSCULAR; INTRAVENOUS ONCE
Status: COMPLETED | OUTPATIENT
Start: 2021-01-01 | End: 2021-01-01

## 2021-01-01 RX ORDER — BUDESONIDE 0.5 MG/2ML
500 INHALANT ORAL
Status: DISCONTINUED | OUTPATIENT
Start: 2021-01-01 | End: 2021-01-01 | Stop reason: HOSPADM

## 2021-01-01 RX ORDER — HYDRALAZINE HYDROCHLORIDE 20 MG/ML
20 INJECTION INTRAMUSCULAR; INTRAVENOUS EVERY 6 HOURS
Status: DISCONTINUED | OUTPATIENT
Start: 2021-01-01 | End: 2021-01-01

## 2021-01-01 RX ORDER — FUROSEMIDE 10 MG/ML
40 INJECTION INTRAMUSCULAR; INTRAVENOUS 2 TIMES DAILY
Status: DISCONTINUED | OUTPATIENT
Start: 2021-01-01 | End: 2021-01-01 | Stop reason: HOSPADM

## 2021-01-01 RX ORDER — FACIAL-BODY WIPES
10 EACH TOPICAL DAILY PRN
Status: DISCONTINUED | OUTPATIENT
Start: 2021-01-01 | End: 2021-01-01

## 2021-01-01 RX ORDER — MODAFINIL 100 MG/1
100 TABLET ORAL DAILY
Status: DISCONTINUED | OUTPATIENT
Start: 2021-01-01 | End: 2021-01-01

## 2021-01-01 RX ORDER — SPIRONOLACTONE 25 MG/1
12.5 TABLET ORAL DAILY
Qty: 8 TABLET | Refills: 0 | Status: SHIPPED | OUTPATIENT
Start: 2021-01-01 | End: 2021-01-01

## 2021-01-01 RX ORDER — INSULIN LISPRO 100 [IU]/ML
INJECTION, SOLUTION INTRAVENOUS; SUBCUTANEOUS
Qty: 1 VIAL | Refills: 0 | Status: SHIPPED
Start: 2021-01-01 | End: 2021-01-01

## 2021-01-01 RX ORDER — GUAIFENESIN 100 MG/5ML
100 SOLUTION ORAL
Status: DISCONTINUED | OUTPATIENT
Start: 2021-01-01 | End: 2021-01-01

## 2021-01-01 RX ORDER — CEFAZOLIN SODIUM/WATER 2 G/20 ML
2 SYRINGE (ML) INTRAVENOUS
Status: COMPLETED | OUTPATIENT
Start: 2021-01-01 | End: 2021-01-01

## 2021-01-01 RX ORDER — FAMOTIDINE 20 MG/1
20 TABLET, FILM COATED ORAL 2 TIMES DAILY
Status: DISCONTINUED | OUTPATIENT
Start: 2021-01-01 | End: 2021-01-01

## 2021-01-01 RX ORDER — PREDNISONE 20 MG/1
40 TABLET ORAL
Status: DISCONTINUED | OUTPATIENT
Start: 2021-01-01 | End: 2021-01-01 | Stop reason: HOSPADM

## 2021-01-01 RX ORDER — ALBUTEROL SULFATE 0.83 MG/ML
2.5 SOLUTION RESPIRATORY (INHALATION) 3 TIMES DAILY
Status: DISCONTINUED | OUTPATIENT
Start: 2021-01-01 | End: 2021-01-01

## 2021-01-01 RX ORDER — FENTANYL CITRATE 50 UG/ML
100 INJECTION, SOLUTION INTRAMUSCULAR; INTRAVENOUS AS NEEDED
Status: DISCONTINUED | OUTPATIENT
Start: 2021-01-01 | End: 2021-01-01 | Stop reason: SDUPTHER

## 2021-01-01 RX ORDER — NOREPINEPHRINE BITARTRATE/D5W 4MG/250ML
PLASTIC BAG, INJECTION (ML) INTRAVENOUS
Status: ACTIVE
Start: 2021-01-01 | End: 2021-01-01

## 2021-01-01 RX ORDER — SODIUM CHLORIDE, SODIUM LACTATE, POTASSIUM CHLORIDE, CALCIUM CHLORIDE 600; 310; 30; 20 MG/100ML; MG/100ML; MG/100ML; MG/100ML
75 INJECTION, SOLUTION INTRAVENOUS CONTINUOUS
Status: DISCONTINUED | OUTPATIENT
Start: 2021-01-01 | End: 2021-01-01

## 2021-01-01 RX ORDER — FAMOTIDINE 20 MG/1
20 TABLET, FILM COATED ORAL EVERY EVENING
Status: DISCONTINUED | OUTPATIENT
Start: 2021-01-01 | End: 2021-01-01 | Stop reason: HOSPADM

## 2021-01-01 RX ORDER — RISPERIDONE 1 MG/1
1 TABLET, FILM COATED ORAL EVERY 12 HOURS
Status: DISCONTINUED | OUTPATIENT
Start: 2021-01-01 | End: 2021-01-01

## 2021-01-01 RX ORDER — LORAZEPAM 2 MG/ML
INJECTION INTRAMUSCULAR
Status: ACTIVE
Start: 2021-01-01 | End: 2021-01-01

## 2021-01-01 RX ORDER — ACETAMINOPHEN 650 MG/1
650 SUPPOSITORY RECTAL
Status: DISCONTINUED | OUTPATIENT
Start: 2021-01-01 | End: 2021-01-01

## 2021-01-01 RX ORDER — INSULIN GLARGINE 100 [IU]/ML
3 INJECTION, SOLUTION SUBCUTANEOUS DAILY
Status: DISCONTINUED | OUTPATIENT
Start: 2021-01-01 | End: 2021-01-01

## 2021-01-01 RX ADMIN — ALBUTEROL SULFATE 2.5 MG: 2.5 SOLUTION RESPIRATORY (INHALATION) at 20:01

## 2021-01-01 RX ADMIN — ALBUTEROL SULFATE 2.5 MG: 2.5 SOLUTION RESPIRATORY (INHALATION) at 13:58

## 2021-01-01 RX ADMIN — HUMAN INSULIN 9 UNITS: 100 INJECTION, SOLUTION SUBCUTANEOUS at 21:38

## 2021-01-01 RX ADMIN — ALBUTEROL SULFATE 2.5 MG: 2.5 SOLUTION RESPIRATORY (INHALATION) at 08:11

## 2021-01-01 RX ADMIN — GABAPENTIN 100 MG: 100 CAPSULE ORAL at 08:07

## 2021-01-01 RX ADMIN — OXYCODONE 15 MG: 5 TABLET ORAL at 11:43

## 2021-01-01 RX ADMIN — OXYCODONE 10 MG: 5 TABLET ORAL at 17:06

## 2021-01-01 RX ADMIN — ALBUTEROL SULFATE 2.5 MG: 2.5 SOLUTION RESPIRATORY (INHALATION) at 14:39

## 2021-01-01 RX ADMIN — DEXMEDETOMIDINE HYDROCHLORIDE 0.8 MCG/KG/HR: 100 INJECTION, SOLUTION INTRAVENOUS at 06:33

## 2021-01-01 RX ADMIN — DEXMEDETOMIDINE HYDROCHLORIDE 1.3 MCG/KG/HR: 100 INJECTION, SOLUTION, CONCENTRATE INTRAVENOUS at 16:46

## 2021-01-01 RX ADMIN — ALBUTEROL SULFATE 2.5 MG: 2.5 SOLUTION RESPIRATORY (INHALATION) at 08:51

## 2021-01-01 RX ADMIN — HEPARIN SODIUM 5000 UNITS: 5000 INJECTION INTRAVENOUS; SUBCUTANEOUS at 08:24

## 2021-01-01 RX ADMIN — ALBUTEROL SULFATE 2.5 MG: 2.5 SOLUTION RESPIRATORY (INHALATION) at 02:18

## 2021-01-01 RX ADMIN — PANTOPRAZOLE SODIUM 40 MG: 40 TABLET, DELAYED RELEASE ORAL at 05:43

## 2021-01-01 RX ADMIN — ALBUTEROL SULFATE 2.5 MG: 2.5 SOLUTION RESPIRATORY (INHALATION) at 03:22

## 2021-01-01 RX ADMIN — ROPINIROLE HYDROCHLORIDE 0.5 MG: 0.5 TABLET, FILM COATED ORAL at 15:26

## 2021-01-01 RX ADMIN — HYDRALAZINE HYDROCHLORIDE 10 MG: 20 INJECTION, SOLUTION INTRAMUSCULAR; INTRAVENOUS at 16:46

## 2021-01-01 RX ADMIN — ALBUTEROL SULFATE 2.5 MG: 2.5 SOLUTION RESPIRATORY (INHALATION) at 08:12

## 2021-01-01 RX ADMIN — RISPERIDONE 1 MG: 1 SOLUTION ORAL at 21:27

## 2021-01-01 RX ADMIN — HYDRALAZINE HYDROCHLORIDE 50 MG: 50 TABLET, FILM COATED ORAL at 06:04

## 2021-01-01 RX ADMIN — RISPERIDONE 0.5 MG: 1 SOLUTION ORAL at 08:00

## 2021-01-01 RX ADMIN — HYDRALAZINE HYDROCHLORIDE 20 MG: 20 INJECTION INTRAMUSCULAR; INTRAVENOUS at 05:49

## 2021-01-01 RX ADMIN — Medication 5 ML: at 17:06

## 2021-01-01 RX ADMIN — ALUMINUM HYDROXIDE, MAGNESIUM HYDROXIDE, DIMETHICONE 5 ML: 200; 200; 20 LIQUID ORAL at 05:40

## 2021-01-01 RX ADMIN — Medication 200 MCG/HR: at 18:55

## 2021-01-01 RX ADMIN — OXYCODONE HYDROCHLORIDE 15 MG: 5 TABLET ORAL at 11:36

## 2021-01-01 RX ADMIN — SODIUM CHLORIDE 5 ML: 9 INJECTION, SOLUTION INTRAMUSCULAR; INTRAVENOUS; SUBCUTANEOUS at 06:00

## 2021-01-01 RX ADMIN — ALBUTEROL SULFATE 2.5 MG: 2.5 SOLUTION RESPIRATORY (INHALATION) at 00:54

## 2021-01-01 RX ADMIN — DEXAMETHASONE SODIUM PHOSPHATE 6 MG: 10 INJECTION INTRAMUSCULAR; INTRAVENOUS at 11:31

## 2021-01-01 RX ADMIN — ALBUTEROL SULFATE 2.5 MG: 2.5 SOLUTION RESPIRATORY (INHALATION) at 02:45

## 2021-01-01 RX ADMIN — HUMAN INSULIN 3 UNITS: 100 INJECTION, SOLUTION SUBCUTANEOUS at 17:10

## 2021-01-01 RX ADMIN — OXYCODONE HYDROCHLORIDE 15 MG: 5 TABLET ORAL at 00:14

## 2021-01-01 RX ADMIN — RDII 250 MG CAPSULE 250 MG: at 09:34

## 2021-01-01 RX ADMIN — OXYCODONE 10 MG: 5 TABLET ORAL at 06:58

## 2021-01-01 RX ADMIN — ALTEPLASE 1 MG: 2.2 INJECTION, POWDER, LYOPHILIZED, FOR SOLUTION INTRAVENOUS at 22:00

## 2021-01-01 RX ADMIN — HYDRALAZINE HYDROCHLORIDE 10 MG: 10 TABLET, FILM COATED ORAL at 08:41

## 2021-01-01 RX ADMIN — SENNOSIDES AND DOCUSATE SODIUM 1 TABLET: 8.6; 5 TABLET ORAL at 08:29

## 2021-01-01 RX ADMIN — LOSARTAN POTASSIUM 100 MG: 50 TABLET, FILM COATED ORAL at 08:18

## 2021-01-01 RX ADMIN — INSULIN LISPRO 6 UNITS: 100 INJECTION, SOLUTION INTRAVENOUS; SUBCUTANEOUS at 12:10

## 2021-01-01 RX ADMIN — ALTEPLASE 1 MG: 2.2 INJECTION, POWDER, LYOPHILIZED, FOR SOLUTION INTRAVENOUS at 11:09

## 2021-01-01 RX ADMIN — OXYCODONE 10 MG: 5 TABLET ORAL at 23:21

## 2021-01-01 RX ADMIN — BUDESONIDE 500 MCG: 0.5 INHALANT RESPIRATORY (INHALATION) at 19:52

## 2021-01-01 RX ADMIN — Medication 10 ML: at 22:40

## 2021-01-01 RX ADMIN — FAMOTIDINE 20 MG: 10 INJECTION INTRAVENOUS at 08:20

## 2021-01-01 RX ADMIN — ALBUTEROL SULFATE 2.5 MG: 2.5 SOLUTION RESPIRATORY (INHALATION) at 04:30

## 2021-01-01 RX ADMIN — Medication 1 AMPULE: at 22:48

## 2021-01-01 RX ADMIN — FUROSEMIDE 80 MG: 10 INJECTION, SOLUTION INTRAMUSCULAR; INTRAVENOUS at 08:35

## 2021-01-01 RX ADMIN — SODIUM CHLORIDE 10 ML: 9 INJECTION, SOLUTION INTRAMUSCULAR; INTRAVENOUS; SUBCUTANEOUS at 22:00

## 2021-01-01 RX ADMIN — RISPERIDONE 1 MG: 1 SOLUTION ORAL at 09:12

## 2021-01-01 RX ADMIN — SODIUM CHLORIDE 10 ML: 9 INJECTION, SOLUTION INTRAMUSCULAR; INTRAVENOUS; SUBCUTANEOUS at 17:48

## 2021-01-01 RX ADMIN — FENTANYL CITRATE 50 MCG: 0.05 INJECTION, SOLUTION INTRAMUSCULAR; INTRAVENOUS at 21:02

## 2021-01-01 RX ADMIN — Medication 1 EACH: at 09:43

## 2021-01-01 RX ADMIN — DEXAMETHASONE SODIUM PHOSPHATE 4 MG: 4 INJECTION, SOLUTION INTRAMUSCULAR; INTRAVENOUS at 11:27

## 2021-01-01 RX ADMIN — ALBUTEROL SULFATE 2.5 MG: 2.5 SOLUTION RESPIRATORY (INHALATION) at 20:03

## 2021-01-01 RX ADMIN — MEROPENEM 500 MG: 500 INJECTION, POWDER, FOR SOLUTION INTRAVENOUS at 08:20

## 2021-01-01 RX ADMIN — DEXMEDETOMIDINE HYDROCHLORIDE 0.5 MCG/KG/HR: 100 INJECTION, SOLUTION INTRAVENOUS at 04:19

## 2021-01-01 RX ADMIN — FUROSEMIDE 40 MG: 40 TABLET ORAL at 08:37

## 2021-01-01 RX ADMIN — HUMAN INSULIN 9 UNITS: 100 INJECTION, SOLUTION SUBCUTANEOUS at 12:00

## 2021-01-01 RX ADMIN — ALBUTEROL SULFATE 2.5 MG: 2.5 SOLUTION RESPIRATORY (INHALATION) at 03:00

## 2021-01-01 RX ADMIN — LORAZEPAM 2 MG: 2 INJECTION INTRAMUSCULAR; INTRAVENOUS at 02:51

## 2021-01-01 RX ADMIN — Medication 1 AMPULE: at 08:31

## 2021-01-01 RX ADMIN — HEPARIN SODIUM 5000 UNITS: 5000 INJECTION INTRAVENOUS; SUBCUTANEOUS at 07:59

## 2021-01-01 RX ADMIN — HYDRALAZINE HYDROCHLORIDE 10 MG: 10 TABLET, FILM COATED ORAL at 21:07

## 2021-01-01 RX ADMIN — ALBUTEROL SULFATE 2.5 MG: 2.5 SOLUTION RESPIRATORY (INHALATION) at 07:56

## 2021-01-01 RX ADMIN — MODAFINIL 100 MG: 100 TABLET ORAL at 08:24

## 2021-01-01 RX ADMIN — HEPARIN SODIUM 5000 UNITS: 5000 INJECTION INTRAVENOUS; SUBCUTANEOUS at 15:29

## 2021-01-01 RX ADMIN — ALUMINUM HYDROXIDE, MAGNESIUM HYDROXIDE, DIMETHICONE 5 ML: 200; 200; 20 LIQUID ORAL at 07:10

## 2021-01-01 RX ADMIN — Medication 1 AMPULE: at 21:25

## 2021-01-01 RX ADMIN — BISACODYL 10 MG: 10 SUPPOSITORY RECTAL at 11:58

## 2021-01-01 RX ADMIN — HEPARIN SODIUM 5000 UNITS: 5000 INJECTION INTRAVENOUS; SUBCUTANEOUS at 23:23

## 2021-01-01 RX ADMIN — ALBUTEROL SULFATE 2.5 MG: 2.5 SOLUTION RESPIRATORY (INHALATION) at 19:56

## 2021-01-01 RX ADMIN — Medication 20 ML: at 04:06

## 2021-01-01 RX ADMIN — NYSTATIN: 100000 POWDER TOPICAL at 09:42

## 2021-01-01 RX ADMIN — IPRATROPIUM BROMIDE AND ALBUTEROL SULFATE 3 ML: .5; 3 SOLUTION RESPIRATORY (INHALATION) at 03:40

## 2021-01-01 RX ADMIN — Medication 200 MCG/HR: at 05:22

## 2021-01-01 RX ADMIN — Medication 220 MG: at 08:16

## 2021-01-01 RX ADMIN — ALBUTEROL SULFATE 2.5 MG: 2.5 SOLUTION RESPIRATORY (INHALATION) at 14:04

## 2021-01-01 RX ADMIN — HYDRALAZINE HYDROCHLORIDE 50 MG: 50 TABLET, FILM COATED ORAL at 11:01

## 2021-01-01 RX ADMIN — HUMAN INSULIN 6 UNITS: 100 INJECTION, SOLUTION SUBCUTANEOUS at 16:27

## 2021-01-01 RX ADMIN — INSULIN GLARGINE 20 UNITS: 100 INJECTION, SOLUTION SUBCUTANEOUS at 21:04

## 2021-01-01 RX ADMIN — INSULIN HUMAN 4 UNITS: 100 INJECTION, SOLUTION PARENTERAL at 05:48

## 2021-01-01 RX ADMIN — ALBUTEROL SULFATE 2.5 MG: 2.5 SOLUTION RESPIRATORY (INHALATION) at 07:38

## 2021-01-01 RX ADMIN — METHADONE HYDROCHLORIDE 10 MG: 5 TABLET ORAL at 11:51

## 2021-01-01 RX ADMIN — ALBUTEROL SULFATE 2.5 MG: 2.5 SOLUTION RESPIRATORY (INHALATION) at 03:36

## 2021-01-01 RX ADMIN — INSULIN HUMAN 2 UNITS: 100 INJECTION, SOLUTION PARENTERAL at 23:44

## 2021-01-01 RX ADMIN — RDII 250 MG CAPSULE 250 MG: at 08:33

## 2021-01-01 RX ADMIN — SODIUM CHLORIDE 10 ML: 9 INJECTION, SOLUTION INTRAMUSCULAR; INTRAVENOUS; SUBCUTANEOUS at 06:12

## 2021-01-01 RX ADMIN — FUROSEMIDE 10 MG/HR: 10 INJECTION, SOLUTION INTRAMUSCULAR; INTRAVENOUS at 01:11

## 2021-01-01 RX ADMIN — OXYCODONE HYDROCHLORIDE 15 MG: 5 TABLET ORAL at 17:42

## 2021-01-01 RX ADMIN — PIPERACILLIN SODIUM AND TAZOBACTAM SODIUM 3.38 G: 3; .375 INJECTION, POWDER, LYOPHILIZED, FOR SOLUTION INTRAVENOUS at 19:58

## 2021-01-01 RX ADMIN — OXYCODONE 15 MG: 5 TABLET ORAL at 00:14

## 2021-01-01 RX ADMIN — OXYCODONE 10 MG: 5 TABLET ORAL at 13:07

## 2021-01-01 RX ADMIN — MORPHINE SULFATE 2 MG: 2 INJECTION, SOLUTION INTRAMUSCULAR; INTRAVENOUS at 09:59

## 2021-01-01 RX ADMIN — GABAPENTIN 100 MG: 100 CAPSULE ORAL at 07:58

## 2021-01-01 RX ADMIN — Medication 220 MG: at 08:02

## 2021-01-01 RX ADMIN — RDII 250 MG CAPSULE 250 MG: at 09:04

## 2021-01-01 RX ADMIN — ALBUTEROL SULFATE 2.5 MG: 2.5 SOLUTION RESPIRATORY (INHALATION) at 08:03

## 2021-01-01 RX ADMIN — HUMAN INSULIN 6 UNITS: 100 INJECTION, SOLUTION SUBCUTANEOUS at 06:00

## 2021-01-01 RX ADMIN — SODIUM CHLORIDE SOLN NEBU 3% 4 ML: 3 NEBU SOLN at 08:25

## 2021-01-01 RX ADMIN — HUMAN INSULIN 3 UNITS: 100 INJECTION, SOLUTION SUBCUTANEOUS at 00:00

## 2021-01-01 RX ADMIN — LINEZOLID 600 MG: 600 INJECTION, SOLUTION INTRAVENOUS at 09:17

## 2021-01-01 RX ADMIN — Medication 125 MCG/HR: at 18:50

## 2021-01-01 RX ADMIN — HUMAN INSULIN 3 UNITS: 100 INJECTION, SOLUTION SUBCUTANEOUS at 12:00

## 2021-01-01 RX ADMIN — RDII 250 MG CAPSULE 250 MG: at 18:00

## 2021-01-01 RX ADMIN — VANCOMYCIN HYDROCHLORIDE 500 MG: 500 INJECTION, POWDER, LYOPHILIZED, FOR SOLUTION INTRAVENOUS at 06:01

## 2021-01-01 RX ADMIN — OXYCODONE HYDROCHLORIDE 15 MG: 5 TABLET ORAL at 05:54

## 2021-01-01 RX ADMIN — FENTANYL CITRATE 50 MCG: 50 INJECTION INTRAMUSCULAR; INTRAVENOUS at 07:18

## 2021-01-01 RX ADMIN — FENTANYL CITRATE 50 MCG: 50 INJECTION INTRAMUSCULAR; INTRAVENOUS at 22:11

## 2021-01-01 RX ADMIN — Medication 1 AMPULE: at 09:09

## 2021-01-01 RX ADMIN — HUMAN INSULIN 12 UNITS: 100 INJECTION, SOLUTION SUBCUTANEOUS at 21:22

## 2021-01-01 RX ADMIN — OXYCODONE HYDROCHLORIDE 15 MG: 5 TABLET ORAL at 11:29

## 2021-01-01 RX ADMIN — INSULIN GLARGINE 14 UNITS: 100 INJECTION, SOLUTION SUBCUTANEOUS at 08:43

## 2021-01-01 RX ADMIN — INSULIN LISPRO 6 UNITS: 100 INJECTION, SOLUTION INTRAVENOUS; SUBCUTANEOUS at 11:41

## 2021-01-01 RX ADMIN — HUMAN INSULIN 6 UNITS: 100 INJECTION, SOLUTION SUBCUTANEOUS at 11:42

## 2021-01-01 RX ADMIN — OXYCODONE HYDROCHLORIDE 5 MG: 5 TABLET ORAL at 03:26

## 2021-01-01 RX ADMIN — HUMAN INSULIN 9 UNITS: 100 INJECTION, SOLUTION SUBCUTANEOUS at 08:47

## 2021-01-01 RX ADMIN — OXYCODONE 10 MG: 5 TABLET ORAL at 17:42

## 2021-01-01 RX ADMIN — Medication 1 EACH: at 08:12

## 2021-01-01 RX ADMIN — SODIUM CHLORIDE 10 ML: 9 INJECTION, SOLUTION INTRAMUSCULAR; INTRAVENOUS; SUBCUTANEOUS at 13:09

## 2021-01-01 RX ADMIN — DEXMEDETOMIDINE HYDROCHLORIDE 0.8 MCG/KG/HR: 100 INJECTION, SOLUTION INTRAVENOUS at 11:30

## 2021-01-01 RX ADMIN — HUMAN INSULIN 6 UNITS: 100 INJECTION, SOLUTION SUBCUTANEOUS at 21:52

## 2021-01-01 RX ADMIN — FENTANYL CITRATE 50 MCG: 50 INJECTION INTRAMUSCULAR; INTRAVENOUS at 22:35

## 2021-01-01 RX ADMIN — INSULIN HUMAN 10 UNITS: 100 INJECTION, SOLUTION PARENTERAL at 18:26

## 2021-01-01 RX ADMIN — LORAZEPAM 2 MG: 2 INJECTION INTRAMUSCULAR; INTRAVENOUS at 00:39

## 2021-01-01 RX ADMIN — HYDROMORPHONE HYDROCHLORIDE 2 MG: 1 INJECTION, SOLUTION INTRAMUSCULAR; INTRAVENOUS; SUBCUTANEOUS at 19:48

## 2021-01-01 RX ADMIN — CEFPODOXIME PROXETIL 200 MG: 200 TABLET, FILM COATED ORAL at 08:37

## 2021-01-01 RX ADMIN — OXYCODONE 10 MG: 5 TABLET ORAL at 23:44

## 2021-01-01 RX ADMIN — METOPROLOL SUCCINATE 12.5 MG: 25 TABLET, EXTENDED RELEASE ORAL at 09:24

## 2021-01-01 RX ADMIN — ALUMINUM HYDROXIDE, MAGNESIUM HYDROXIDE, DIMETHICONE 5 ML: 200; 200; 20 LIQUID ORAL at 06:00

## 2021-01-01 RX ADMIN — FENTANYL CITRATE 50 MCG: 0.05 INJECTION, SOLUTION INTRAMUSCULAR; INTRAVENOUS at 02:19

## 2021-01-01 RX ADMIN — LORAZEPAM 2 MG: 2 INJECTION INTRAMUSCULAR; INTRAVENOUS at 08:10

## 2021-01-01 RX ADMIN — PANTOPRAZOLE SODIUM 40 MG: 40 INJECTION, POWDER, FOR SOLUTION INTRAVENOUS at 10:00

## 2021-01-01 RX ADMIN — HYDRALAZINE HYDROCHLORIDE 50 MG: 50 TABLET, FILM COATED ORAL at 17:24

## 2021-01-01 RX ADMIN — HEPARIN SODIUM 5000 UNITS: 5000 INJECTION INTRAVENOUS; SUBCUTANEOUS at 10:29

## 2021-01-01 RX ADMIN — SODIUM CHLORIDE SOLN NEBU 3% 4 ML: 3 NEBU SOLN at 08:13

## 2021-01-01 RX ADMIN — INSULIN GLARGINE 10 UNITS: 100 INJECTION, SOLUTION SUBCUTANEOUS at 10:30

## 2021-01-01 RX ADMIN — SODIUM CHLORIDE 20 ML: 9 INJECTION, SOLUTION INTRAMUSCULAR; INTRAVENOUS; SUBCUTANEOUS at 22:00

## 2021-01-01 RX ADMIN — POTASSIUM BICARBONATE 40 MEQ: 782 TABLET, EFFERVESCENT ORAL at 06:00

## 2021-01-01 RX ADMIN — Medication 10 ML: at 22:53

## 2021-01-01 RX ADMIN — ALBUTEROL SULFATE 2.5 MG: 2.5 SOLUTION RESPIRATORY (INHALATION) at 02:29

## 2021-01-01 RX ADMIN — SODIUM CHLORIDE SOLN NEBU 3% 4 ML: 3 NEBU SOLN at 08:22

## 2021-01-01 RX ADMIN — ALBUTEROL SULFATE 2.5 MG: 2.5 SOLUTION RESPIRATORY (INHALATION) at 08:04

## 2021-01-01 RX ADMIN — DEXMEDETOMIDINE HYDROCHLORIDE 1.5 MCG/KG/HR: 100 INJECTION, SOLUTION INTRAVENOUS at 05:52

## 2021-01-01 RX ADMIN — SODIUM CHLORIDE 20 ML: 9 INJECTION, SOLUTION INTRAMUSCULAR; INTRAVENOUS; SUBCUTANEOUS at 14:00

## 2021-01-01 RX ADMIN — Medication 100 MCG/HR: at 10:32

## 2021-01-01 RX ADMIN — ALBUTEROL SULFATE 2.5 MG: 2.5 SOLUTION RESPIRATORY (INHALATION) at 19:52

## 2021-01-01 RX ADMIN — Medication 175 MCG/HR: at 05:55

## 2021-01-01 RX ADMIN — HEPARIN SODIUM 5000 UNITS: 5000 INJECTION INTRAVENOUS; SUBCUTANEOUS at 21:03

## 2021-01-01 RX ADMIN — FUROSEMIDE 80 MG: 10 INJECTION, SOLUTION INTRAMUSCULAR; INTRAVENOUS at 09:20

## 2021-01-01 RX ADMIN — SODIUM CHLORIDE 10 ML: 9 INJECTION, SOLUTION INTRAMUSCULAR; INTRAVENOUS; SUBCUTANEOUS at 21:48

## 2021-01-01 RX ADMIN — OXYCODONE 10 MG: 5 TABLET ORAL at 17:49

## 2021-01-01 RX ADMIN — DEXAMETHASONE SODIUM PHOSPHATE 4 MG: 4 INJECTION, SOLUTION INTRAMUSCULAR; INTRAVENOUS at 13:12

## 2021-01-01 RX ADMIN — Medication 10 ML: at 23:11

## 2021-01-01 RX ADMIN — HUMAN INSULIN 4 UNITS: 100 INJECTION, SOLUTION SUBCUTANEOUS at 07:47

## 2021-01-01 RX ADMIN — HUMAN INSULIN 6 UNITS: 100 INJECTION, SOLUTION SUBCUTANEOUS at 11:10

## 2021-01-01 RX ADMIN — HEPARIN SODIUM 5000 UNITS: 5000 INJECTION INTRAVENOUS; SUBCUTANEOUS at 08:00

## 2021-01-01 RX ADMIN — HUMAN INSULIN 6 UNITS: 100 INJECTION, SOLUTION SUBCUTANEOUS at 21:20

## 2021-01-01 RX ADMIN — ALUMINUM HYDROXIDE, MAGNESIUM HYDROXIDE, DIMETHICONE 5 ML: 200; 200; 20 LIQUID ORAL at 05:30

## 2021-01-01 RX ADMIN — RISPERIDONE 1 MG: 1 TABLET ORAL at 09:48

## 2021-01-01 RX ADMIN — ALBUTEROL SULFATE 2.5 MG: 2.5 SOLUTION RESPIRATORY (INHALATION) at 19:32

## 2021-01-01 RX ADMIN — Medication 10 ML: at 14:29

## 2021-01-01 RX ADMIN — ASPIRIN 81 MG: 81 TABLET ORAL at 08:07

## 2021-01-01 RX ADMIN — HEPARIN SODIUM 5000 UNITS: 5000 INJECTION INTRAVENOUS; SUBCUTANEOUS at 23:01

## 2021-01-01 RX ADMIN — OXYCODONE HYDROCHLORIDE 15 MG: 5 TABLET ORAL at 06:08

## 2021-01-01 RX ADMIN — OXYCODONE 10 MG: 5 TABLET ORAL at 23:22

## 2021-01-01 RX ADMIN — HYDRALAZINE HYDROCHLORIDE 10 MG: 10 TABLET, FILM COATED ORAL at 17:00

## 2021-01-01 RX ADMIN — FENTANYL CITRATE 75 MCG: 50 INJECTION, SOLUTION INTRAMUSCULAR; INTRAVENOUS at 09:27

## 2021-01-01 RX ADMIN — HUMAN INSULIN 6 UNITS: 100 INJECTION, SOLUTION SUBCUTANEOUS at 18:00

## 2021-01-01 RX ADMIN — VITAMIN D, TAB 1000IU (100/BT) 5 TABLET: 25 TAB at 09:31

## 2021-01-01 RX ADMIN — Medication 10 ML: at 05:52

## 2021-01-01 RX ADMIN — DEXMEDETOMIDINE HYDROCHLORIDE 0.9 MCG/KG/HR: 100 INJECTION, SOLUTION INTRAVENOUS at 14:35

## 2021-01-01 RX ADMIN — FAMOTIDINE 20 MG: 20 TABLET, FILM COATED ORAL at 08:00

## 2021-01-01 RX ADMIN — SODIUM CHLORIDE SOLN NEBU 3% 4 ML: 3 NEBU SOLN at 08:12

## 2021-01-01 RX ADMIN — Medication 1 AMPULE: at 22:08

## 2021-01-01 RX ADMIN — FENTANYL CITRATE 50 MCG: 50 INJECTION INTRAMUSCULAR; INTRAVENOUS at 00:20

## 2021-01-01 RX ADMIN — Medication 1 AMPULE: at 21:00

## 2021-01-01 RX ADMIN — OXYCODONE 10 MG: 5 TABLET ORAL at 05:24

## 2021-01-01 RX ADMIN — INSULIN GLARGINE 10 UNITS: 100 INJECTION, SOLUTION SUBCUTANEOUS at 08:27

## 2021-01-01 RX ADMIN — SODIUM CHLORIDE 10 ML: 9 INJECTION, SOLUTION INTRAMUSCULAR; INTRAVENOUS; SUBCUTANEOUS at 13:03

## 2021-01-01 RX ADMIN — Medication 5 ML: at 05:05

## 2021-01-01 RX ADMIN — RDII 250 MG CAPSULE 250 MG: at 18:31

## 2021-01-01 RX ADMIN — OXYCODONE 10 MG: 5 TABLET ORAL at 17:17

## 2021-01-01 RX ADMIN — Medication 5 ML: at 22:09

## 2021-01-01 RX ADMIN — SODIUM CHLORIDE 40 MG: 9 INJECTION, SOLUTION INTRAMUSCULAR; INTRAVENOUS; SUBCUTANEOUS at 09:25

## 2021-01-01 RX ADMIN — ALUMINUM HYDROXIDE, MAGNESIUM HYDROXIDE, DIMETHICONE 5 ML: 200; 200; 20 LIQUID ORAL at 23:45

## 2021-01-01 RX ADMIN — ALUMINUM HYDROXIDE, MAGNESIUM HYDROXIDE, DIMETHICONE 5 ML: 200; 200; 20 LIQUID ORAL at 18:00

## 2021-01-01 RX ADMIN — INSULIN LISPRO 9 UNITS: 100 INJECTION, SOLUTION INTRAVENOUS; SUBCUTANEOUS at 00:00

## 2021-01-01 RX ADMIN — DEXTROSE MONOHYDRATE 12.5 G: 500 INJECTION PARENTERAL at 21:49

## 2021-01-01 RX ADMIN — ALBUTEROL SULFATE 2.5 MG: 2.5 SOLUTION RESPIRATORY (INHALATION) at 13:49

## 2021-01-01 RX ADMIN — Medication 10 ML: at 21:17

## 2021-01-01 RX ADMIN — HYDROMORPHONE HYDROCHLORIDE 2 MG: 1 INJECTION, SOLUTION INTRAMUSCULAR; INTRAVENOUS; SUBCUTANEOUS at 08:31

## 2021-01-01 RX ADMIN — SODIUM CHLORIDE 40 MG: 9 INJECTION, SOLUTION INTRAMUSCULAR; INTRAVENOUS; SUBCUTANEOUS at 08:33

## 2021-01-01 RX ADMIN — OXYCODONE 15 MG: 5 TABLET ORAL at 05:54

## 2021-01-01 RX ADMIN — ALBUTEROL SULFATE 2.5 MG: 2.5 SOLUTION RESPIRATORY (INHALATION) at 01:40

## 2021-01-01 RX ADMIN — HUMAN INSULIN 3 UNITS: 100 INJECTION, SOLUTION SUBCUTANEOUS at 18:21

## 2021-01-01 RX ADMIN — Medication 1 AMPULE: at 22:34

## 2021-01-01 RX ADMIN — HEPARIN SODIUM 5000 UNITS: 5000 INJECTION INTRAVENOUS; SUBCUTANEOUS at 15:47

## 2021-01-01 RX ADMIN — SODIUM CHLORIDE 10 ML: 9 INJECTION, SOLUTION INTRAMUSCULAR; INTRAVENOUS; SUBCUTANEOUS at 06:03

## 2021-01-01 RX ADMIN — DEXMEDETOMIDINE HYDROCHLORIDE 1.4 MCG/KG/HR: 100 INJECTION, SOLUTION INTRAVENOUS at 10:31

## 2021-01-01 RX ADMIN — FAMOTIDINE 20 MG: 10 INJECTION INTRAVENOUS at 09:29

## 2021-01-01 RX ADMIN — DEXMEDETOMIDINE HYDROCHLORIDE 1.5 MCG/KG/HR: 100 INJECTION, SOLUTION INTRAVENOUS at 13:02

## 2021-01-01 RX ADMIN — HYDROMORPHONE HYDROCHLORIDE 2 MG: 1 INJECTION, SOLUTION INTRAMUSCULAR; INTRAVENOUS; SUBCUTANEOUS at 19:32

## 2021-01-01 RX ADMIN — LINEZOLID 600 MG: 600 INJECTION, SOLUTION INTRAVENOUS at 21:34

## 2021-01-01 RX ADMIN — SODIUM CHLORIDE 10 ML: 9 INJECTION, SOLUTION INTRAMUSCULAR; INTRAVENOUS; SUBCUTANEOUS at 14:00

## 2021-01-01 RX ADMIN — SODIUM CHLORIDE 500 ML: 900 INJECTION, SOLUTION INTRAVENOUS at 19:41

## 2021-01-01 RX ADMIN — NOREPINEPHRINE-DEXTROSE IV SOLUTION 4 MG/250ML-5% 6 MCG/MIN: 4-5/250 SOLUTION at 02:07

## 2021-01-01 RX ADMIN — RISPERIDONE 1 MG: 1 TABLET ORAL at 08:35

## 2021-01-01 RX ADMIN — ROPINIROLE HYDROCHLORIDE 0.5 MG: 0.5 TABLET, FILM COATED ORAL at 09:34

## 2021-01-01 RX ADMIN — RISPERIDONE 0.5 MG: 1 SOLUTION ORAL at 09:00

## 2021-01-01 RX ADMIN — ACETAMINOPHEN 650 MG: 325 TABLET ORAL at 10:51

## 2021-01-01 RX ADMIN — DEXMEDETOMIDINE HYDROCHLORIDE 1.3 MCG/KG/HR: 100 INJECTION, SOLUTION, CONCENTRATE INTRAVENOUS at 09:04

## 2021-01-01 RX ADMIN — LIDOCAINE HYDROCHLORIDE 30 MG: 20 INJECTION, SOLUTION EPIDURAL; INFILTRATION; INTRACAUDAL; PERINEURAL at 11:09

## 2021-01-01 RX ADMIN — INSULIN HUMAN 10 UNITS: 100 INJECTION, SOLUTION PARENTERAL at 05:38

## 2021-01-01 RX ADMIN — ALBUTEROL SULFATE 2.5 MG: 2.5 SOLUTION RESPIRATORY (INHALATION) at 20:33

## 2021-01-01 RX ADMIN — HUMAN INSULIN 6 UNITS: 100 INJECTION, SOLUTION SUBCUTANEOUS at 09:24

## 2021-01-01 RX ADMIN — FENTANYL CITRATE 50 MCG: 50 INJECTION INTRAMUSCULAR; INTRAVENOUS at 13:52

## 2021-01-01 RX ADMIN — LORAZEPAM 2 MG: 2 INJECTION INTRAMUSCULAR; INTRAVENOUS at 23:36

## 2021-01-01 RX ADMIN — HEPARIN SODIUM 5000 UNITS: 5000 INJECTION INTRAVENOUS; SUBCUTANEOUS at 23:10

## 2021-01-01 RX ADMIN — SODIUM ZIRCONIUM CYCLOSILICATE 10 G: 10 POWDER, FOR SUSPENSION ORAL at 11:56

## 2021-01-01 RX ADMIN — Medication 1 AMPULE: at 08:58

## 2021-01-01 RX ADMIN — HEPARIN SODIUM 5000 UNITS: 5000 INJECTION INTRAVENOUS; SUBCUTANEOUS at 00:12

## 2021-01-01 RX ADMIN — DEXMEDETOMIDINE HYDROCHLORIDE 1 MCG/KG/HR: 100 INJECTION, SOLUTION, CONCENTRATE INTRAVENOUS at 10:32

## 2021-01-01 RX ADMIN — HEPARIN SODIUM 5000 UNITS: 5000 INJECTION INTRAVENOUS; SUBCUTANEOUS at 05:51

## 2021-01-01 RX ADMIN — DOCUSATE SODIUM 100 MG: 50 LIQUID ORAL at 00:59

## 2021-01-01 RX ADMIN — SODIUM ZIRCONIUM CYCLOSILICATE 5 G: 5 POWDER, FOR SUSPENSION ORAL at 15:07

## 2021-01-01 RX ADMIN — RDII 250 MG CAPSULE 250 MG: at 17:05

## 2021-01-01 RX ADMIN — Medication 1 AMPULE: at 08:45

## 2021-01-01 RX ADMIN — ALUMINUM HYDROXIDE, MAGNESIUM HYDROXIDE, DIMETHICONE 5 ML: 200; 200; 20 LIQUID ORAL at 13:00

## 2021-01-01 RX ADMIN — Medication 1 AMPULE: at 09:15

## 2021-01-01 RX ADMIN — SODIUM CHLORIDE 10 ML: 9 INJECTION, SOLUTION INTRAMUSCULAR; INTRAVENOUS; SUBCUTANEOUS at 06:00

## 2021-01-01 RX ADMIN — ACETAMINOPHEN 650 MG: 325 TABLET ORAL at 20:05

## 2021-01-01 RX ADMIN — LINEZOLID 600 MG: 600 TABLET, FILM COATED ORAL at 21:46

## 2021-01-01 RX ADMIN — LINEZOLID 600 MG: 600 TABLET, FILM COATED ORAL at 21:37

## 2021-01-01 RX ADMIN — INSULIN HUMAN 2 UNITS: 100 INJECTION, SOLUTION PARENTERAL at 06:27

## 2021-01-01 RX ADMIN — SODIUM CHLORIDE 10 ML: 9 INJECTION, SOLUTION INTRAMUSCULAR; INTRAVENOUS; SUBCUTANEOUS at 21:00

## 2021-01-01 RX ADMIN — FUROSEMIDE 40 MG: 10 INJECTION, SOLUTION INTRAMUSCULAR; INTRAVENOUS at 22:47

## 2021-01-01 RX ADMIN — SODIUM CHLORIDE, SODIUM LACTATE, POTASSIUM CHLORIDE, AND CALCIUM CHLORIDE 1000 ML: 600; 310; 30; 20 INJECTION, SOLUTION INTRAVENOUS at 10:50

## 2021-01-01 RX ADMIN — HYDRALAZINE HYDROCHLORIDE 10 MG: 20 INJECTION, SOLUTION INTRAMUSCULAR; INTRAVENOUS at 02:07

## 2021-01-01 RX ADMIN — HUMAN INSULIN 3 UNITS: 100 INJECTION, SOLUTION SUBCUTANEOUS at 06:00

## 2021-01-01 RX ADMIN — MORPHINE SULFATE 2 MG: 2 INJECTION, SOLUTION INTRAMUSCULAR; INTRAVENOUS at 21:25

## 2021-01-01 RX ADMIN — ROSUVASTATIN 20 MG: 10 TABLET, FILM COATED ORAL at 21:42

## 2021-01-01 RX ADMIN — SODIUM CHLORIDE 10 ML: 9 INJECTION, SOLUTION INTRAMUSCULAR; INTRAVENOUS; SUBCUTANEOUS at 05:51

## 2021-01-01 RX ADMIN — HEPARIN SODIUM 5000 UNITS: 5000 INJECTION INTRAVENOUS; SUBCUTANEOUS at 00:08

## 2021-01-01 RX ADMIN — HUMAN INSULIN 6 UNITS: 100 INJECTION, SOLUTION SUBCUTANEOUS at 04:09

## 2021-01-01 RX ADMIN — ACETAZOLAMIDE 500 MG: 250 TABLET ORAL at 09:14

## 2021-01-01 RX ADMIN — SENNOSIDES AND DOCUSATE SODIUM 1 TABLET: 8.6; 5 TABLET ORAL at 21:03

## 2021-01-01 RX ADMIN — SODIUM CHLORIDE 10 ML: 9 INJECTION, SOLUTION INTRAMUSCULAR; INTRAVENOUS; SUBCUTANEOUS at 21:11

## 2021-01-01 RX ADMIN — ALBUTEROL SULFATE 2.5 MG: 2.5 SOLUTION RESPIRATORY (INHALATION) at 05:12

## 2021-01-01 RX ADMIN — INSULIN LISPRO 4 UNITS: 100 INJECTION, SOLUTION INTRAVENOUS; SUBCUTANEOUS at 22:40

## 2021-01-01 RX ADMIN — HYDROMORPHONE HYDROCHLORIDE 2 MG: 1 INJECTION, SOLUTION INTRAMUSCULAR; INTRAVENOUS; SUBCUTANEOUS at 12:08

## 2021-01-01 RX ADMIN — ALBUTEROL SULFATE 2.5 MG: 2.5 SOLUTION RESPIRATORY (INHALATION) at 03:14

## 2021-01-01 RX ADMIN — OXYCODONE 10 MG: 5 TABLET ORAL at 11:44

## 2021-01-01 RX ADMIN — Medication 1 AMPULE: at 20:00

## 2021-01-01 RX ADMIN — RDII 250 MG CAPSULE 250 MG: at 08:51

## 2021-01-01 RX ADMIN — METOPROLOL SUCCINATE 12.5 MG: 25 TABLET, EXTENDED RELEASE ORAL at 09:19

## 2021-01-01 RX ADMIN — INSULIN LISPRO 2 UNITS: 100 INJECTION, SOLUTION INTRAVENOUS; SUBCUTANEOUS at 21:13

## 2021-01-01 RX ADMIN — HEPARIN SODIUM 5000 UNITS: 5000 INJECTION INTRAVENOUS; SUBCUTANEOUS at 09:55

## 2021-01-01 RX ADMIN — LEVETIRACETAM 500 MG: 100 INJECTION, SOLUTION INTRAVENOUS at 11:11

## 2021-01-01 RX ADMIN — INSULIN GLARGINE 20 UNITS: 100 INJECTION, SOLUTION SUBCUTANEOUS at 09:03

## 2021-01-01 RX ADMIN — HUMAN INSULIN 3 UNITS: 100 INJECTION, SOLUTION SUBCUTANEOUS at 01:00

## 2021-01-01 RX ADMIN — LINEZOLID 600 MG: 600 TABLET, FILM COATED ORAL at 08:47

## 2021-01-01 RX ADMIN — METHYLPREDNISOLONE SODIUM SUCCINATE 40 MG: 40 INJECTION, POWDER, FOR SOLUTION INTRAMUSCULAR; INTRAVENOUS at 00:38

## 2021-01-01 RX ADMIN — VITAMIN D, TAB 1000IU (100/BT) 5 TABLET: 25 TAB at 08:02

## 2021-01-01 RX ADMIN — HEPARIN SODIUM 5000 UNITS: 5000 INJECTION INTRAVENOUS; SUBCUTANEOUS at 16:00

## 2021-01-01 RX ADMIN — INSULIN HUMAN 9 UNITS: 100 INJECTION, SOLUTION PARENTERAL at 00:24

## 2021-01-01 RX ADMIN — INSULIN GLARGINE 30 UNITS: 100 INJECTION, SOLUTION SUBCUTANEOUS at 22:07

## 2021-01-01 RX ADMIN — NYSTATIN: 100000 POWDER TOPICAL at 06:04

## 2021-01-01 RX ADMIN — ALBUTEROL SULFATE 2.5 MG: 2.5 SOLUTION RESPIRATORY (INHALATION) at 01:32

## 2021-01-01 RX ADMIN — Medication 10 ML: at 21:00

## 2021-01-01 RX ADMIN — RISPERIDONE 0.5 MG: 1 SOLUTION ORAL at 09:43

## 2021-01-01 RX ADMIN — METOPROLOL TARTRATE 12.5 MG: 25 TABLET, FILM COATED ORAL at 09:51

## 2021-01-01 RX ADMIN — HEPARIN SODIUM 5000 UNITS: 5000 INJECTION INTRAVENOUS; SUBCUTANEOUS at 17:00

## 2021-01-01 RX ADMIN — SODIUM CHLORIDE 40 ML: 9 INJECTION, SOLUTION INTRAMUSCULAR; INTRAVENOUS; SUBCUTANEOUS at 21:22

## 2021-01-01 RX ADMIN — Medication 1 AMPULE: at 08:40

## 2021-01-01 RX ADMIN — SODIUM CHLORIDE 40 ML: 9 INJECTION, SOLUTION INTRAMUSCULAR; INTRAVENOUS; SUBCUTANEOUS at 06:00

## 2021-01-01 RX ADMIN — INSULIN HUMAN 6 UNITS: 100 INJECTION, SOLUTION PARENTERAL at 11:57

## 2021-01-01 RX ADMIN — INSULIN GLARGINE 15 UNITS: 100 INJECTION, SOLUTION SUBCUTANEOUS at 08:35

## 2021-01-01 RX ADMIN — ASPIRIN 81 MG: 81 TABLET ORAL at 08:42

## 2021-01-01 RX ADMIN — OXYCODONE 10 MG: 5 TABLET ORAL at 05:40

## 2021-01-01 RX ADMIN — ALBUTEROL SULFATE 2.5 MG: 2.5 SOLUTION RESPIRATORY (INHALATION) at 08:13

## 2021-01-01 RX ADMIN — HUMAN INSULIN 6 UNITS: 100 INJECTION, SOLUTION SUBCUTANEOUS at 17:49

## 2021-01-01 RX ADMIN — Medication 1 AMPULE: at 21:28

## 2021-01-01 RX ADMIN — HEPARIN SODIUM 18 UNITS/KG/HR: 5000 INJECTION, SOLUTION INTRAVENOUS at 11:52

## 2021-01-01 RX ADMIN — DEXTROSE MONOHYDRATE 25 ML/HR: 5 INJECTION, SOLUTION INTRAVENOUS at 22:10

## 2021-01-01 RX ADMIN — DEXMEDETOMIDINE HYDROCHLORIDE 1.2 MCG/KG/HR: 100 INJECTION, SOLUTION, CONCENTRATE INTRAVENOUS at 18:00

## 2021-01-01 RX ADMIN — IPRATROPIUM BROMIDE AND ALBUTEROL SULFATE 3 ML: .5; 3 SOLUTION RESPIRATORY (INHALATION) at 04:52

## 2021-01-01 RX ADMIN — ALBUTEROL SULFATE 2.5 MG: 2.5 SOLUTION RESPIRATORY (INHALATION) at 21:12

## 2021-01-01 RX ADMIN — ALBUTEROL SULFATE 2.5 MG: 2.5 SOLUTION RESPIRATORY (INHALATION) at 03:37

## 2021-01-01 RX ADMIN — SODIUM CHLORIDE SOLN NEBU 3% 4 ML: 3 NEBU SOLN at 08:30

## 2021-01-01 RX ADMIN — Medication 1 AMPULE: at 08:52

## 2021-01-01 RX ADMIN — Medication 10 ML: at 14:00

## 2021-01-01 RX ADMIN — RISPERIDONE 1 MG: 1 TABLET ORAL at 22:44

## 2021-01-01 RX ADMIN — HEPARIN SODIUM 5000 UNITS: 5000 INJECTION INTRAVENOUS; SUBCUTANEOUS at 23:03

## 2021-01-01 RX ADMIN — HUMAN INSULIN 3 UNITS: 100 INJECTION, SOLUTION SUBCUTANEOUS at 17:39

## 2021-01-01 RX ADMIN — ACETAMINOPHEN 650 MG: 325 TABLET, FILM COATED ORAL at 22:07

## 2021-01-01 RX ADMIN — HUMAN INSULIN 6 UNITS: 100 INJECTION, SOLUTION SUBCUTANEOUS at 17:42

## 2021-01-01 RX ADMIN — FUROSEMIDE 80 MG: 10 INJECTION, SOLUTION INTRAMUSCULAR; INTRAVENOUS at 20:24

## 2021-01-01 RX ADMIN — HEPARIN SODIUM 5000 UNITS: 5000 INJECTION INTRAVENOUS; SUBCUTANEOUS at 00:00

## 2021-01-01 RX ADMIN — NOREPINEPHRINE-DEXTROSE IV SOLUTION 4 MG/250ML-5% 8 MCG/MIN: 4-5/250 SOLUTION at 18:25

## 2021-01-01 RX ADMIN — SODIUM CHLORIDE SOLN NEBU 3% 4 ML: 3 NEBU SOLN at 20:07

## 2021-01-01 RX ADMIN — INSULIN LISPRO 3 UNITS: 100 INJECTION, SOLUTION INTRAVENOUS; SUBCUTANEOUS at 17:21

## 2021-01-01 RX ADMIN — Medication 1 AMPULE: at 21:46

## 2021-01-01 RX ADMIN — LOPERAMIDE HCL 2 MG: 1 SOLUTION ORAL at 23:49

## 2021-01-01 RX ADMIN — FAMOTIDINE 20 MG: 20 TABLET ORAL at 16:18

## 2021-01-01 RX ADMIN — DEXAMETHASONE SODIUM PHOSPHATE 6 MG: 10 INJECTION INTRAMUSCULAR; INTRAVENOUS at 11:57

## 2021-01-01 RX ADMIN — CEFTRIAXONE 1 G: 1 INJECTION, POWDER, FOR SOLUTION INTRAMUSCULAR; INTRAVENOUS at 13:43

## 2021-01-01 RX ADMIN — ALBUTEROL SULFATE 2.5 MG: 2.5 SOLUTION RESPIRATORY (INHALATION) at 02:00

## 2021-01-01 RX ADMIN — METHADONE HYDROCHLORIDE 10 MG: 5 TABLET ORAL at 18:09

## 2021-01-01 RX ADMIN — HUMAN INSULIN 6 UNITS: 100 INJECTION, SOLUTION SUBCUTANEOUS at 22:51

## 2021-01-01 RX ADMIN — HYDRALAZINE HYDROCHLORIDE 10 MG: 10 TABLET, FILM COATED ORAL at 21:22

## 2021-01-01 RX ADMIN — ALBUTEROL SULFATE 2.5 MG: 2.5 SOLUTION RESPIRATORY (INHALATION) at 13:52

## 2021-01-01 RX ADMIN — Medication 1 AMPULE: at 22:01

## 2021-01-01 RX ADMIN — DEXMEDETOMIDINE HYDROCHLORIDE 1.3 MCG/KG/HR: 100 INJECTION, SOLUTION INTRAVENOUS at 08:07

## 2021-01-01 RX ADMIN — INSULIN LISPRO 10 UNITS: 100 INJECTION, SOLUTION INTRAVENOUS; SUBCUTANEOUS at 17:10

## 2021-01-01 RX ADMIN — OXYCODONE 15 MG: 5 TABLET ORAL at 12:59

## 2021-01-01 RX ADMIN — MEROPENEM 500 MG: 500 INJECTION, POWDER, FOR SOLUTION INTRAVENOUS at 16:08

## 2021-01-01 RX ADMIN — SUCCINYLCHOLINE CHLORIDE 130 MG: 20 INJECTION, SOLUTION INTRAMUSCULAR; INTRAVENOUS at 12:52

## 2021-01-01 RX ADMIN — ACETAMINOPHEN 650 MG: 650 SUPPOSITORY RECTAL at 22:48

## 2021-01-01 RX ADMIN — HEPARIN SODIUM 5000 UNITS: 5000 INJECTION INTRAVENOUS; SUBCUTANEOUS at 15:41

## 2021-01-01 RX ADMIN — ALBUTEROL SULFATE 2.5 MG: 2.5 SOLUTION RESPIRATORY (INHALATION) at 08:27

## 2021-01-01 RX ADMIN — SODIUM CHLORIDE 10 ML: 9 INJECTION, SOLUTION INTRAMUSCULAR; INTRAVENOUS; SUBCUTANEOUS at 13:53

## 2021-01-01 RX ADMIN — FERROUS SULFATE TAB 325 MG (65 MG ELEMENTAL FE) 325 MG: 325 (65 FE) TAB at 22:15

## 2021-01-01 RX ADMIN — IPRATROPIUM BROMIDE AND ALBUTEROL SULFATE 3 ML: .5; 3 SOLUTION RESPIRATORY (INHALATION) at 08:10

## 2021-01-01 RX ADMIN — PHENYLEPHRINE HYDROCHLORIDE 100 MCG: 10 INJECTION INTRAVENOUS at 13:05

## 2021-01-01 RX ADMIN — HUMAN INSULIN 3 UNITS: 100 INJECTION, SOLUTION SUBCUTANEOUS at 08:27

## 2021-01-01 RX ADMIN — PROPOFOL 25 MCG/KG/MIN: 10 INJECTION, EMULSION INTRAVENOUS at 22:44

## 2021-01-01 RX ADMIN — ALBUMIN (HUMAN) 12.5 G: 0.25 INJECTION, SOLUTION INTRAVENOUS at 18:04

## 2021-01-01 RX ADMIN — INSULIN GLARGINE 8 UNITS: 100 INJECTION, SOLUTION SUBCUTANEOUS at 00:36

## 2021-01-01 RX ADMIN — SODIUM ZIRCONIUM CYCLOSILICATE 10 G: 10 POWDER, FOR SUSPENSION ORAL at 16:55

## 2021-01-01 RX ADMIN — Medication 1 AMPULE: at 20:44

## 2021-01-01 RX ADMIN — SODIUM CHLORIDE 40 ML: 9 INJECTION, SOLUTION INTRAMUSCULAR; INTRAVENOUS; SUBCUTANEOUS at 21:00

## 2021-01-01 RX ADMIN — HUMAN INSULIN 9 UNITS: 100 INJECTION, SOLUTION SUBCUTANEOUS at 07:11

## 2021-01-01 RX ADMIN — DOCUSATE SODIUM 100 MG: 50 LIQUID ORAL at 09:45

## 2021-01-01 RX ADMIN — ACETAMINOPHEN 650 MG: 325 TABLET, FILM COATED ORAL at 20:35

## 2021-01-01 RX ADMIN — LORAZEPAM 2 MG: 2 INJECTION INTRAMUSCULAR; INTRAVENOUS at 11:36

## 2021-01-01 RX ADMIN — HUMAN INSULIN 6 UNITS: 100 INJECTION, SOLUTION SUBCUTANEOUS at 12:56

## 2021-01-01 RX ADMIN — Medication 10 ML: at 02:25

## 2021-01-01 RX ADMIN — DEXMEDETOMIDINE HYDROCHLORIDE 1.2 MCG/KG/HR: 100 INJECTION, SOLUTION, CONCENTRATE INTRAVENOUS at 03:08

## 2021-01-01 RX ADMIN — ALUMINUM HYDROXIDE, MAGNESIUM HYDROXIDE, DIMETHICONE 5 ML: 200; 200; 20 LIQUID ORAL at 11:11

## 2021-01-01 RX ADMIN — ALBUTEROL SULFATE 2.5 MG: 2.5 SOLUTION RESPIRATORY (INHALATION) at 07:36

## 2021-01-01 RX ADMIN — FUROSEMIDE 40 MG: 10 INJECTION, SOLUTION INTRAMUSCULAR; INTRAVENOUS at 08:45

## 2021-01-01 RX ADMIN — INSULIN GLARGINE 25 UNITS: 100 INJECTION, SOLUTION SUBCUTANEOUS at 21:27

## 2021-01-01 RX ADMIN — INSULIN LISPRO 15 UNITS: 100 INJECTION, SOLUTION INTRAVENOUS; SUBCUTANEOUS at 11:11

## 2021-01-01 RX ADMIN — HYDRALAZINE HYDROCHLORIDE 10 MG: 10 TABLET, FILM COATED ORAL at 16:28

## 2021-01-01 RX ADMIN — DEXAMETHASONE SODIUM PHOSPHATE 6 MG: 10 INJECTION INTRAMUSCULAR; INTRAVENOUS at 12:04

## 2021-01-01 RX ADMIN — HUMAN INSULIN 6 UNITS: 100 INJECTION, SOLUTION SUBCUTANEOUS at 23:04

## 2021-01-01 RX ADMIN — ALBUTEROL SULFATE 2.5 MG: 2.5 SOLUTION RESPIRATORY (INHALATION) at 19:43

## 2021-01-01 RX ADMIN — DEXMEDETOMIDINE HYDROCHLORIDE 0.9 MCG/KG/HR: 100 INJECTION, SOLUTION INTRAVENOUS at 20:44

## 2021-01-01 RX ADMIN — ACETAMINOPHEN 650 MG: 325 TABLET ORAL at 23:13

## 2021-01-01 RX ADMIN — ACETAMINOPHEN 650 MG: 325 TABLET, FILM COATED ORAL at 09:15

## 2021-01-01 RX ADMIN — Medication 10 ML: at 21:53

## 2021-01-01 RX ADMIN — Medication 10 ML: at 05:55

## 2021-01-01 RX ADMIN — Medication 1 AMPULE: at 08:30

## 2021-01-01 RX ADMIN — INSULIN LISPRO 6 UNITS: 100 INJECTION, SOLUTION INTRAVENOUS; SUBCUTANEOUS at 13:25

## 2021-01-01 RX ADMIN — HUMAN INSULIN 3 UNITS: 100 INJECTION, SOLUTION SUBCUTANEOUS at 08:00

## 2021-01-01 RX ADMIN — SODIUM CHLORIDE 40 ML: 9 INJECTION, SOLUTION INTRAMUSCULAR; INTRAVENOUS; SUBCUTANEOUS at 14:00

## 2021-01-01 RX ADMIN — SODIUM CHLORIDE 10 ML: 9 INJECTION, SOLUTION INTRAMUSCULAR; INTRAVENOUS; SUBCUTANEOUS at 05:48

## 2021-01-01 RX ADMIN — SODIUM CHLORIDE, SODIUM LACTATE, POTASSIUM CHLORIDE, AND CALCIUM CHLORIDE 1000 ML: 600; 310; 30; 20 INJECTION, SOLUTION INTRAVENOUS at 11:58

## 2021-01-01 RX ADMIN — DEXTROSE MONOHYDRATE 75 ML/HR: 5 INJECTION, SOLUTION INTRAVENOUS at 05:00

## 2021-01-01 RX ADMIN — ALUMINUM HYDROXIDE, MAGNESIUM HYDROXIDE, DIMETHICONE 5 ML: 200; 200; 20 LIQUID ORAL at 00:10

## 2021-01-01 RX ADMIN — RISPERIDONE 0.5 MG: 1 SOLUTION ORAL at 09:22

## 2021-01-01 RX ADMIN — ALBUTEROL SULFATE 2.5 MG: 2.5 SOLUTION RESPIRATORY (INHALATION) at 14:10

## 2021-01-01 RX ADMIN — HEPARIN SODIUM 5000 UNITS: 5000 INJECTION INTRAVENOUS; SUBCUTANEOUS at 22:48

## 2021-01-01 RX ADMIN — METHYLPREDNISOLONE SODIUM SUCCINATE 40 MG: 40 INJECTION, POWDER, FOR SOLUTION INTRAMUSCULAR; INTRAVENOUS at 23:10

## 2021-01-01 RX ADMIN — PANTOPRAZOLE SODIUM 40 MG: 40 INJECTION, POWDER, FOR SOLUTION INTRAVENOUS at 08:45

## 2021-01-01 RX ADMIN — HYDRALAZINE HYDROCHLORIDE 50 MG: 50 TABLET, FILM COATED ORAL at 12:08

## 2021-01-01 RX ADMIN — HEPARIN SODIUM 5000 UNITS: 5000 INJECTION INTRAVENOUS; SUBCUTANEOUS at 01:00

## 2021-01-01 RX ADMIN — SODIUM CHLORIDE SOLN NEBU 3% 4 ML: 3 NEBU SOLN at 07:56

## 2021-01-01 RX ADMIN — FUROSEMIDE 80 MG: 10 INJECTION, SOLUTION INTRAMUSCULAR; INTRAVENOUS at 20:29

## 2021-01-01 RX ADMIN — ASPIRIN 81 MG: 81 TABLET ORAL at 08:40

## 2021-01-01 RX ADMIN — ALBUTEROL SULFATE 2.5 MG: 2.5 SOLUTION RESPIRATORY (INHALATION) at 20:00

## 2021-01-01 RX ADMIN — INSULIN GLARGINE 30 UNITS: 100 INJECTION, SOLUTION SUBCUTANEOUS at 22:44

## 2021-01-01 RX ADMIN — HUMAN INSULIN 9 UNITS: 100 INJECTION, SOLUTION SUBCUTANEOUS at 18:45

## 2021-01-01 RX ADMIN — DEXMEDETOMIDINE HYDROCHLORIDE 0.4 MCG/KG/HR: 100 INJECTION, SOLUTION INTRAVENOUS at 11:30

## 2021-01-01 RX ADMIN — Medication 10 ML: at 15:54

## 2021-01-01 RX ADMIN — Medication 1 AMPULE: at 21:10

## 2021-01-01 RX ADMIN — ACETAMINOPHEN 650 MG: 325 TABLET, FILM COATED ORAL at 22:40

## 2021-01-01 RX ADMIN — ALBUTEROL SULFATE 2.5 MG: 2.5 SOLUTION RESPIRATORY (INHALATION) at 04:14

## 2021-01-01 RX ADMIN — HEPARIN SODIUM 5000 UNITS: 5000 INJECTION INTRAVENOUS; SUBCUTANEOUS at 17:47

## 2021-01-01 RX ADMIN — MORPHINE SULFATE 2 MG: 2 INJECTION, SOLUTION INTRAMUSCULAR; INTRAVENOUS at 19:58

## 2021-01-01 RX ADMIN — METHYLPREDNISOLONE SODIUM SUCCINATE 40 MG: 40 INJECTION, POWDER, FOR SOLUTION INTRAMUSCULAR; INTRAVENOUS at 08:34

## 2021-01-01 RX ADMIN — SODIUM CHLORIDE 75 ML/HR: 900 INJECTION, SOLUTION INTRAVENOUS at 12:19

## 2021-01-01 RX ADMIN — SODIUM CHLORIDE 10 ML: 9 INJECTION, SOLUTION INTRAMUSCULAR; INTRAVENOUS; SUBCUTANEOUS at 20:25

## 2021-01-01 RX ADMIN — HUMAN INSULIN 15 UNITS: 100 INJECTION, SOLUTION SUBCUTANEOUS at 08:35

## 2021-01-01 RX ADMIN — HYDROMORPHONE HYDROCHLORIDE 2 MG: 1 INJECTION, SOLUTION INTRAMUSCULAR; INTRAVENOUS; SUBCUTANEOUS at 18:04

## 2021-01-01 RX ADMIN — FUROSEMIDE 20 MG: 10 INJECTION, SOLUTION INTRAMUSCULAR; INTRAVENOUS at 09:29

## 2021-01-01 RX ADMIN — ALBUTEROL SULFATE 2.5 MG: 2.5 SOLUTION RESPIRATORY (INHALATION) at 02:50

## 2021-01-01 RX ADMIN — INSULIN GLARGINE 30 UNITS: 100 INJECTION, SOLUTION SUBCUTANEOUS at 08:47

## 2021-01-01 RX ADMIN — Medication 10 ML: at 05:49

## 2021-01-01 RX ADMIN — HYDROMORPHONE HYDROCHLORIDE 2 MG: 1 INJECTION, SOLUTION INTRAMUSCULAR; INTRAVENOUS; SUBCUTANEOUS at 13:34

## 2021-01-01 RX ADMIN — SODIUM CHLORIDE 10 ML: 9 INJECTION, SOLUTION INTRAMUSCULAR; INTRAVENOUS; SUBCUTANEOUS at 21:24

## 2021-01-01 RX ADMIN — INSULIN LISPRO 3 UNITS: 100 INJECTION, SOLUTION INTRAVENOUS; SUBCUTANEOUS at 00:25

## 2021-01-01 RX ADMIN — HEPARIN SODIUM 5000 UNITS: 5000 INJECTION INTRAVENOUS; SUBCUTANEOUS at 16:27

## 2021-01-01 RX ADMIN — Medication 200 MCG/HR: at 01:15

## 2021-01-01 RX ADMIN — Medication 1 AMPULE: at 08:19

## 2021-01-01 RX ADMIN — HEPARIN SODIUM 5000 UNITS: 5000 INJECTION INTRAVENOUS; SUBCUTANEOUS at 16:25

## 2021-01-01 RX ADMIN — FAMOTIDINE 20 MG: 20 TABLET, FILM COATED ORAL at 08:24

## 2021-01-01 RX ADMIN — HYDRALAZINE HYDROCHLORIDE 10 MG: 10 TABLET, FILM COATED ORAL at 08:24

## 2021-01-01 RX ADMIN — ALBUTEROL SULFATE 2.5 MG: 2.5 SOLUTION RESPIRATORY (INHALATION) at 08:22

## 2021-01-01 RX ADMIN — CLOPIDOGREL BISULFATE 75 MG: 75 TABLET ORAL at 08:34

## 2021-01-01 RX ADMIN — Medication 10 ML: at 05:21

## 2021-01-01 RX ADMIN — SODIUM CHLORIDE 10 ML: 9 INJECTION, SOLUTION INTRAMUSCULAR; INTRAVENOUS; SUBCUTANEOUS at 22:04

## 2021-01-01 RX ADMIN — HEPARIN SODIUM 5000 UNITS: 5000 INJECTION INTRAVENOUS; SUBCUTANEOUS at 08:58

## 2021-01-01 RX ADMIN — Medication 10 ML: at 06:33

## 2021-01-01 RX ADMIN — Medication 50 MCG/HR: at 15:12

## 2021-01-01 RX ADMIN — ALBUTEROL SULFATE 2.5 MG: 2.5 SOLUTION RESPIRATORY (INHALATION) at 01:59

## 2021-01-01 RX ADMIN — ALBUTEROL SULFATE 2.5 MG: 2.5 SOLUTION RESPIRATORY (INHALATION) at 14:13

## 2021-01-01 RX ADMIN — HYDRALAZINE HYDROCHLORIDE 10 MG: 10 TABLET, FILM COATED ORAL at 08:35

## 2021-01-01 RX ADMIN — Medication 1 AMPULE: at 10:01

## 2021-01-01 RX ADMIN — PHENYLEPHRINE HYDROCHLORIDE 100 MCG: 10 INJECTION INTRAVENOUS at 13:06

## 2021-01-01 RX ADMIN — ALBUTEROL SULFATE 2.5 MG: 2.5 SOLUTION RESPIRATORY (INHALATION) at 20:35

## 2021-01-01 RX ADMIN — HUMAN INSULIN 6 UNITS: 100 INJECTION, SOLUTION SUBCUTANEOUS at 05:55

## 2021-01-01 RX ADMIN — Medication 10 ML: at 21:56

## 2021-01-01 RX ADMIN — ALUMINUM HYDROXIDE, MAGNESIUM HYDROXIDE, DIMETHICONE 5 ML: 200; 200; 20 LIQUID ORAL at 11:54

## 2021-01-01 RX ADMIN — DEXMEDETOMIDINE HYDROCHLORIDE 1.3 MCG/KG/HR: 100 INJECTION, SOLUTION INTRAVENOUS at 03:32

## 2021-01-01 RX ADMIN — OXYCODONE 10 MG: 5 TABLET ORAL at 00:28

## 2021-01-01 RX ADMIN — FUROSEMIDE 40 MG: 40 TABLET ORAL at 09:27

## 2021-01-01 RX ADMIN — CEFTRIAXONE 1 G: 1 INJECTION, POWDER, FOR SOLUTION INTRAMUSCULAR; INTRAVENOUS at 15:12

## 2021-01-01 RX ADMIN — FUROSEMIDE 80 MG: 10 INJECTION, SOLUTION INTRAMUSCULAR; INTRAVENOUS at 11:36

## 2021-01-01 RX ADMIN — Medication 1 AMPULE: at 08:33

## 2021-01-01 RX ADMIN — HYDRALAZINE HYDROCHLORIDE 10 MG: 10 TABLET, FILM COATED ORAL at 08:09

## 2021-01-01 RX ADMIN — ALBUTEROL SULFATE 2.5 MG: 2.5 SOLUTION RESPIRATORY (INHALATION) at 08:20

## 2021-01-01 RX ADMIN — DEXMEDETOMIDINE HYDROCHLORIDE 1.3 MCG/KG/HR: 100 INJECTION, SOLUTION, CONCENTRATE INTRAVENOUS at 12:44

## 2021-01-01 RX ADMIN — INSULIN GLARGINE 20 UNITS: 100 INJECTION, SOLUTION SUBCUTANEOUS at 09:45

## 2021-01-01 RX ADMIN — MEROPENEM 500 MG: 500 INJECTION, POWDER, FOR SOLUTION INTRAVENOUS at 08:26

## 2021-01-01 RX ADMIN — Medication 1 AMPULE: at 09:22

## 2021-01-01 RX ADMIN — DEXMEDETOMIDINE HYDROCHLORIDE 1.3 MCG/KG/HR: 100 INJECTION, SOLUTION, CONCENTRATE INTRAVENOUS at 10:57

## 2021-01-01 RX ADMIN — FUROSEMIDE 40 MG: 40 TABLET ORAL at 08:54

## 2021-01-01 RX ADMIN — HUMAN INSULIN 4 UNITS: 100 INJECTION, SOLUTION SUBCUTANEOUS at 16:00

## 2021-01-01 RX ADMIN — ALBUTEROL SULFATE 2.5 MG: 2.5 SOLUTION RESPIRATORY (INHALATION) at 15:32

## 2021-01-01 RX ADMIN — DEXMEDETOMIDINE HYDROCHLORIDE 1.5 MCG/KG/HR: 100 INJECTION, SOLUTION INTRAVENOUS at 17:42

## 2021-01-01 RX ADMIN — FENTANYL CITRATE 50 MCG: 50 INJECTION INTRAMUSCULAR; INTRAVENOUS at 13:01

## 2021-01-01 RX ADMIN — LINEZOLID 600 MG: 600 TABLET, FILM COATED ORAL at 08:55

## 2021-01-01 RX ADMIN — GUANFACINE 1 MG: 1 TABLET ORAL at 21:01

## 2021-01-01 RX ADMIN — SODIUM CHLORIDE 10 ML: 9 INJECTION, SOLUTION INTRAMUSCULAR; INTRAVENOUS; SUBCUTANEOUS at 15:21

## 2021-01-01 RX ADMIN — SODIUM CHLORIDE 10 ML: 9 INJECTION, SOLUTION INTRAMUSCULAR; INTRAVENOUS; SUBCUTANEOUS at 21:52

## 2021-01-01 RX ADMIN — MEROPENEM 500 MG: 500 INJECTION, POWDER, FOR SOLUTION INTRAVENOUS at 08:30

## 2021-01-01 RX ADMIN — ALBUTEROL SULFATE 2.5 MG: 2.5 SOLUTION RESPIRATORY (INHALATION) at 08:02

## 2021-01-01 RX ADMIN — ALBUTEROL SULFATE 2.5 MG: 2.5 SOLUTION RESPIRATORY (INHALATION) at 13:20

## 2021-01-01 RX ADMIN — SODIUM CHLORIDE 10 ML: 9 INJECTION, SOLUTION INTRAMUSCULAR; INTRAVENOUS; SUBCUTANEOUS at 22:01

## 2021-01-01 RX ADMIN — HEPARIN SODIUM 5000 UNITS: 5000 INJECTION INTRAVENOUS; SUBCUTANEOUS at 00:10

## 2021-01-01 RX ADMIN — ALBUTEROL SULFATE 2.5 MG: 2.5 SOLUTION RESPIRATORY (INHALATION) at 08:33

## 2021-01-01 RX ADMIN — ALBUTEROL SULFATE 2.5 MG: 2.5 SOLUTION RESPIRATORY (INHALATION) at 03:16

## 2021-01-01 RX ADMIN — DEXMEDETOMIDINE HYDROCHLORIDE 1.2 MCG/KG/HR: 100 INJECTION, SOLUTION, CONCENTRATE INTRAVENOUS at 16:34

## 2021-01-01 RX ADMIN — RISPERIDONE 1 MG: 1 SOLUTION ORAL at 21:10

## 2021-01-01 RX ADMIN — FUROSEMIDE 80 MG: 10 INJECTION, SOLUTION INTRAMUSCULAR; INTRAVENOUS at 08:00

## 2021-01-01 RX ADMIN — Medication 10 ML: at 14:26

## 2021-01-01 RX ADMIN — ALBUTEROL SULFATE 2.5 MG: 2.5 SOLUTION RESPIRATORY (INHALATION) at 14:29

## 2021-01-01 RX ADMIN — DICLOFENAC SODIUM 2 G: 10 GEL TOPICAL at 12:12

## 2021-01-01 RX ADMIN — MORPHINE SULFATE 2 MG: 2 INJECTION, SOLUTION INTRAMUSCULAR; INTRAVENOUS at 14:30

## 2021-01-01 RX ADMIN — FENTANYL CITRATE 50 MCG: 50 INJECTION INTRAMUSCULAR; INTRAVENOUS at 04:37

## 2021-01-01 RX ADMIN — HUMAN INSULIN 6 UNITS: 100 INJECTION, SOLUTION SUBCUTANEOUS at 11:57

## 2021-01-01 RX ADMIN — HYDRALAZINE HYDROCHLORIDE 50 MG: 50 TABLET, FILM COATED ORAL at 17:12

## 2021-01-01 RX ADMIN — PROPOFOL 5 MCG/KG/MIN: 10 INJECTION, EMULSION INTRAVENOUS at 12:19

## 2021-01-01 RX ADMIN — Medication 1 AMPULE: at 21:06

## 2021-01-01 RX ADMIN — HYDROMORPHONE HYDROCHLORIDE 2 MG: 1 INJECTION, SOLUTION INTRAMUSCULAR; INTRAVENOUS; SUBCUTANEOUS at 05:24

## 2021-01-01 RX ADMIN — HUMAN INSULIN 3 UNITS: 100 INJECTION, SOLUTION SUBCUTANEOUS at 16:05

## 2021-01-01 RX ADMIN — CLOPIDOGREL BISULFATE 75 MG: 75 TABLET ORAL at 08:18

## 2021-01-01 RX ADMIN — HUMAN INSULIN 3 UNITS: 100 INJECTION, SOLUTION SUBCUTANEOUS at 09:40

## 2021-01-01 RX ADMIN — HEPARIN SODIUM 5000 UNITS: 5000 INJECTION INTRAVENOUS; SUBCUTANEOUS at 00:09

## 2021-01-01 RX ADMIN — ALBUTEROL SULFATE 2.5 MG: 2.5 SOLUTION RESPIRATORY (INHALATION) at 10:26

## 2021-01-01 RX ADMIN — LORAZEPAM 1 MG: 2 INJECTION INTRAMUSCULAR; INTRAVENOUS at 10:05

## 2021-01-01 RX ADMIN — SODIUM CHLORIDE 10 ML: 9 INJECTION, SOLUTION INTRAMUSCULAR; INTRAVENOUS; SUBCUTANEOUS at 14:30

## 2021-01-01 RX ADMIN — ALBUTEROL SULFATE 2.5 MG: 2.5 SOLUTION RESPIRATORY (INHALATION) at 20:52

## 2021-01-01 RX ADMIN — SODIUM CHLORIDE 10 ML: 9 INJECTION, SOLUTION INTRAMUSCULAR; INTRAVENOUS; SUBCUTANEOUS at 13:26

## 2021-01-01 RX ADMIN — ALBUTEROL SULFATE 2.5 MG: 2.5 SOLUTION RESPIRATORY (INHALATION) at 20:27

## 2021-01-01 RX ADMIN — HEPARIN SODIUM 5000 UNITS: 5000 INJECTION INTRAVENOUS; SUBCUTANEOUS at 16:13

## 2021-01-01 RX ADMIN — FENTANYL CITRATE 200 MCG/HR: 0.05 INJECTION, SOLUTION INTRAMUSCULAR; INTRAVENOUS at 15:41

## 2021-01-01 RX ADMIN — GABAPENTIN 100 MG: 100 CAPSULE ORAL at 16:45

## 2021-01-01 RX ADMIN — ALBUTEROL SULFATE 2.5 MG: 2.5 SOLUTION RESPIRATORY (INHALATION) at 15:06

## 2021-01-01 RX ADMIN — Medication 1 AMPULE: at 20:07

## 2021-01-01 RX ADMIN — IPRATROPIUM BROMIDE AND ALBUTEROL SULFATE 3 ML: .5; 3 SOLUTION RESPIRATORY (INHALATION) at 15:39

## 2021-01-01 RX ADMIN — SODIUM CHLORIDE 40 ML: 9 INJECTION, SOLUTION INTRAMUSCULAR; INTRAVENOUS; SUBCUTANEOUS at 21:10

## 2021-01-01 RX ADMIN — Medication 30 ML: at 01:08

## 2021-01-01 RX ADMIN — OXYCODONE HYDROCHLORIDE AND ACETAMINOPHEN 500 MG: 500 TABLET ORAL at 08:52

## 2021-01-01 RX ADMIN — HUMAN INSULIN 6 UNITS: 100 INJECTION, SOLUTION SUBCUTANEOUS at 06:06

## 2021-01-01 RX ADMIN — FENTANYL CITRATE 125 MCG/HR: 0.05 INJECTION, SOLUTION INTRAMUSCULAR; INTRAVENOUS at 03:23

## 2021-01-01 RX ADMIN — PANTOPRAZOLE SODIUM 40 MG: 40 TABLET, DELAYED RELEASE ORAL at 06:30

## 2021-01-01 RX ADMIN — Medication 10 ML: at 21:18

## 2021-01-01 RX ADMIN — HYDRALAZINE HYDROCHLORIDE 50 MG: 50 TABLET, FILM COATED ORAL at 00:24

## 2021-01-01 RX ADMIN — Medication 1000 MG: at 08:02

## 2021-01-01 RX ADMIN — ALBUTEROL SULFATE 2.5 MG: 2.5 SOLUTION RESPIRATORY (INHALATION) at 03:34

## 2021-01-01 RX ADMIN — ALBUTEROL SULFATE 2.5 MG: 2.5 SOLUTION RESPIRATORY (INHALATION) at 21:04

## 2021-01-01 RX ADMIN — Medication 1 AMPULE: at 09:27

## 2021-01-01 RX ADMIN — SODIUM CHLORIDE 10 ML: 9 INJECTION, SOLUTION INTRAMUSCULAR; INTRAVENOUS; SUBCUTANEOUS at 05:30

## 2021-01-01 RX ADMIN — Medication 200 MCG/HR: at 07:12

## 2021-01-01 RX ADMIN — FUROSEMIDE 40 MG: 10 INJECTION, SOLUTION INTRAMUSCULAR; INTRAVENOUS at 21:31

## 2021-01-01 RX ADMIN — SODIUM CHLORIDE 10 ML: 9 INJECTION, SOLUTION INTRAMUSCULAR; INTRAVENOUS; SUBCUTANEOUS at 14:14

## 2021-01-01 RX ADMIN — METOPROLOL TARTRATE 12.5 MG: 25 TABLET, FILM COATED ORAL at 17:20

## 2021-01-01 RX ADMIN — IPRATROPIUM BROMIDE AND ALBUTEROL SULFATE 3 ML: .5; 3 SOLUTION RESPIRATORY (INHALATION) at 15:34

## 2021-01-01 RX ADMIN — HUMAN INSULIN 9 UNITS: 100 INJECTION, SOLUTION SUBCUTANEOUS at 12:12

## 2021-01-01 RX ADMIN — HUMAN INSULIN 9 UNITS: 100 INJECTION, SOLUTION SUBCUTANEOUS at 00:07

## 2021-01-01 RX ADMIN — INSULIN LISPRO 6 UNITS: 100 INJECTION, SOLUTION INTRAVENOUS; SUBCUTANEOUS at 13:32

## 2021-01-01 RX ADMIN — ALBUTEROL SULFATE 2.5 MG: 2.5 SOLUTION RESPIRATORY (INHALATION) at 07:55

## 2021-01-01 RX ADMIN — SODIUM CHLORIDE SOLN NEBU 3% 4 ML: 3 NEBU SOLN at 09:06

## 2021-01-01 RX ADMIN — INSULIN HUMAN 8 UNITS: 100 INJECTION, SOLUTION PARENTERAL at 11:27

## 2021-01-01 RX ADMIN — METHYLPREDNISOLONE SODIUM SUCCINATE 40 MG: 40 INJECTION, POWDER, FOR SOLUTION INTRAMUSCULAR; INTRAVENOUS at 20:04

## 2021-01-01 RX ADMIN — LOSARTAN POTASSIUM 100 MG: 50 TABLET, FILM COATED ORAL at 08:37

## 2021-01-01 RX ADMIN — OXYCODONE HYDROCHLORIDE 5 MG: 5 TABLET ORAL at 13:55

## 2021-01-01 RX ADMIN — Medication 200 MCG/HR: at 06:55

## 2021-01-01 RX ADMIN — ALBUTEROL SULFATE 2.5 MG: 2.5 SOLUTION RESPIRATORY (INHALATION) at 19:51

## 2021-01-01 RX ADMIN — HEPARIN SODIUM 5000 UNITS: 5000 INJECTION INTRAVENOUS; SUBCUTANEOUS at 00:20

## 2021-01-01 RX ADMIN — MEROPENEM 500 MG: 500 INJECTION, POWDER, FOR SOLUTION INTRAVENOUS at 09:25

## 2021-01-01 RX ADMIN — HEPARIN SODIUM 5000 UNITS: 5000 INJECTION INTRAVENOUS; SUBCUTANEOUS at 16:29

## 2021-01-01 RX ADMIN — HYDRALAZINE HYDROCHLORIDE 20 MG: 20 INJECTION INTRAMUSCULAR; INTRAVENOUS at 06:50

## 2021-01-01 RX ADMIN — SODIUM CHLORIDE 20 ML: 9 INJECTION, SOLUTION INTRAMUSCULAR; INTRAVENOUS; SUBCUTANEOUS at 06:00

## 2021-01-01 RX ADMIN — PANTOPRAZOLE SODIUM 40 MG: 40 TABLET, DELAYED RELEASE ORAL at 09:09

## 2021-01-01 RX ADMIN — FUROSEMIDE 40 MG: 40 TABLET ORAL at 09:24

## 2021-01-01 RX ADMIN — FENTANYL CITRATE 200 MCG/HR: 0.05 INJECTION, SOLUTION INTRAMUSCULAR; INTRAVENOUS at 03:51

## 2021-01-01 RX ADMIN — RISPERIDONE 1 MG: 1 SOLUTION ORAL at 21:58

## 2021-01-01 RX ADMIN — SODIUM CHLORIDE 40 ML: 9 INJECTION, SOLUTION INTRAMUSCULAR; INTRAVENOUS; SUBCUTANEOUS at 20:07

## 2021-01-01 RX ADMIN — Medication 1 AMPULE: at 21:55

## 2021-01-01 RX ADMIN — INSULIN LISPRO 2 UNITS: 100 INJECTION, SOLUTION INTRAVENOUS; SUBCUTANEOUS at 22:25

## 2021-01-01 RX ADMIN — HYDRALAZINE HYDROCHLORIDE 10 MG: 20 INJECTION, SOLUTION INTRAMUSCULAR; INTRAVENOUS at 02:28

## 2021-01-01 RX ADMIN — METOPROLOL TARTRATE 12.5 MG: 25 TABLET, FILM COATED ORAL at 21:51

## 2021-01-01 RX ADMIN — CEFEPIME HYDROCHLORIDE 1 G: 1 INJECTION, POWDER, FOR SOLUTION INTRAMUSCULAR; INTRAVENOUS at 21:35

## 2021-01-01 RX ADMIN — LORAZEPAM 1 MG: 2 INJECTION INTRAMUSCULAR; INTRAVENOUS at 11:07

## 2021-01-01 RX ADMIN — RDII 250 MG CAPSULE 250 MG: at 09:24

## 2021-01-01 RX ADMIN — RISPERIDONE 0.25 MG: 1 SOLUTION ORAL at 09:00

## 2021-01-01 RX ADMIN — HEPARIN SODIUM 5000 UNITS: 5000 INJECTION INTRAVENOUS; SUBCUTANEOUS at 00:31

## 2021-01-01 RX ADMIN — DEXMEDETOMIDINE HYDROCHLORIDE 1.3 MCG/KG/HR: 100 INJECTION, SOLUTION, CONCENTRATE INTRAVENOUS at 01:40

## 2021-01-01 RX ADMIN — HYDRALAZINE HYDROCHLORIDE 10 MG: 10 TABLET, FILM COATED ORAL at 16:32

## 2021-01-01 RX ADMIN — RDII 250 MG CAPSULE 250 MG: at 08:09

## 2021-01-01 RX ADMIN — POTASSIUM BICARBONATE 40 MEQ: 782 TABLET, EFFERVESCENT ORAL at 18:30

## 2021-01-01 RX ADMIN — FUROSEMIDE 40 MG: 10 INJECTION, SOLUTION INTRAMUSCULAR; INTRAVENOUS at 08:24

## 2021-01-01 RX ADMIN — METHYLPREDNISOLONE SODIUM SUCCINATE 40 MG: 40 INJECTION, POWDER, FOR SOLUTION INTRAMUSCULAR; INTRAVENOUS at 17:37

## 2021-01-01 RX ADMIN — FENTANYL CITRATE 50 MCG: 0.05 INJECTION, SOLUTION INTRAMUSCULAR; INTRAVENOUS at 19:36

## 2021-01-01 RX ADMIN — LORAZEPAM 1 MG: 2 INJECTION INTRAMUSCULAR; INTRAVENOUS at 12:50

## 2021-01-01 RX ADMIN — FUROSEMIDE 20 MG: 10 INJECTION, SOLUTION INTRAMUSCULAR; INTRAVENOUS at 09:20

## 2021-01-01 RX ADMIN — HUMAN INSULIN 12 UNITS: 100 INJECTION, SOLUTION SUBCUTANEOUS at 08:07

## 2021-01-01 RX ADMIN — ALUMINUM HYDROXIDE, MAGNESIUM HYDROXIDE, DIMETHICONE 5 ML: 200; 200; 20 LIQUID ORAL at 18:30

## 2021-01-01 RX ADMIN — ALBUTEROL SULFATE 2.5 MG: 2.5 SOLUTION RESPIRATORY (INHALATION) at 15:37

## 2021-01-01 RX ADMIN — HUMAN INSULIN 3 UNITS: 100 INJECTION, SOLUTION SUBCUTANEOUS at 20:04

## 2021-01-01 RX ADMIN — FAMOTIDINE 20 MG: 10 INJECTION INTRAVENOUS at 08:56

## 2021-01-01 RX ADMIN — DEXMEDETOMIDINE HYDROCHLORIDE 0.8 MCG/KG/HR: 100 INJECTION, SOLUTION INTRAVENOUS at 04:45

## 2021-01-01 RX ADMIN — Medication 1 AMPULE: at 20:29

## 2021-01-01 RX ADMIN — ALBUTEROL SULFATE 2.5 MG: 2.5 SOLUTION RESPIRATORY (INHALATION) at 15:12

## 2021-01-01 RX ADMIN — SODIUM CHLORIDE 10 ML: 9 INJECTION, SOLUTION INTRAMUSCULAR; INTRAVENOUS; SUBCUTANEOUS at 05:29

## 2021-01-01 RX ADMIN — ASPIRIN 81 MG: 81 TABLET ORAL at 09:09

## 2021-01-01 RX ADMIN — Medication 1 AMPULE: at 20:49

## 2021-01-01 RX ADMIN — Medication 1 AMPULE: at 08:38

## 2021-01-01 RX ADMIN — Medication 1 AMPULE: at 09:43

## 2021-01-01 RX ADMIN — HUMAN INSULIN 3 UNITS: 100 INJECTION, SOLUTION SUBCUTANEOUS at 01:07

## 2021-01-01 RX ADMIN — FUROSEMIDE 20 MG: 10 INJECTION, SOLUTION INTRAMUSCULAR; INTRAVENOUS at 09:48

## 2021-01-01 RX ADMIN — Medication 10 ML: at 22:15

## 2021-01-01 RX ADMIN — RDII 250 MG CAPSULE 250 MG: at 17:42

## 2021-01-01 RX ADMIN — DEXMEDETOMIDINE HYDROCHLORIDE 1.3 MCG/KG/HR: 100 INJECTION, SOLUTION, CONCENTRATE INTRAVENOUS at 15:57

## 2021-01-01 RX ADMIN — HEPARIN SODIUM 5000 UNITS: 5000 INJECTION INTRAVENOUS; SUBCUTANEOUS at 16:32

## 2021-01-01 RX ADMIN — HEPARIN SODIUM 5000 UNITS: 5000 INJECTION INTRAVENOUS; SUBCUTANEOUS at 08:04

## 2021-01-01 RX ADMIN — ALBUTEROL SULFATE 2.5 MG: 2.5 SOLUTION RESPIRATORY (INHALATION) at 08:47

## 2021-01-01 RX ADMIN — ASPIRIN 81 MG: 81 TABLET ORAL at 08:47

## 2021-01-01 RX ADMIN — BARIUM SULFATE 60 ML: 980 POWDER, FOR SUSPENSION ORAL at 10:08

## 2021-01-01 RX ADMIN — HEPARIN SODIUM 5000 UNITS: 5000 INJECTION INTRAVENOUS; SUBCUTANEOUS at 13:42

## 2021-01-01 RX ADMIN — ALBUTEROL SULFATE 2.5 MG: 2.5 SOLUTION RESPIRATORY (INHALATION) at 20:31

## 2021-01-01 RX ADMIN — SODIUM CHLORIDE 10 ML: 9 INJECTION, SOLUTION INTRAMUSCULAR; INTRAVENOUS; SUBCUTANEOUS at 13:01

## 2021-01-01 RX ADMIN — SODIUM ZIRCONIUM CYCLOSILICATE 10 G: 10 POWDER, FOR SUSPENSION ORAL at 08:48

## 2021-01-01 RX ADMIN — SODIUM CHLORIDE, SODIUM LACTATE, POTASSIUM CHLORIDE, AND CALCIUM CHLORIDE 75 ML/HR: 600; 310; 30; 20 INJECTION, SOLUTION INTRAVENOUS at 23:51

## 2021-01-01 RX ADMIN — ALBUTEROL SULFATE 2.5 MG: 2.5 SOLUTION RESPIRATORY (INHALATION) at 21:51

## 2021-01-01 RX ADMIN — Medication 10 ML: at 05:01

## 2021-01-01 RX ADMIN — Medication 1 AMPULE: at 10:29

## 2021-01-01 RX ADMIN — SENNOSIDES AND DOCUSATE SODIUM 2 TABLET: 8.6; 5 TABLET ORAL at 08:02

## 2021-01-01 RX ADMIN — HEPARIN SODIUM 5000 UNITS: 5000 INJECTION INTRAVENOUS; SUBCUTANEOUS at 15:20

## 2021-01-01 RX ADMIN — METHYLPREDNISOLONE SODIUM SUCCINATE 40 MG: 40 INJECTION, POWDER, FOR SOLUTION INTRAMUSCULAR; INTRAVENOUS at 22:20

## 2021-01-01 RX ADMIN — SODIUM CHLORIDE 80 MG: 9 INJECTION, SOLUTION INTRAMUSCULAR; INTRAVENOUS; SUBCUTANEOUS at 08:05

## 2021-01-01 RX ADMIN — HUMAN INSULIN 15 UNITS: 100 INJECTION, SOLUTION SUBCUTANEOUS at 01:00

## 2021-01-01 RX ADMIN — HUMAN INSULIN 12 UNITS: 100 INJECTION, SOLUTION SUBCUTANEOUS at 21:27

## 2021-01-01 RX ADMIN — ALBUTEROL SULFATE 2.5 MG: 2.5 SOLUTION RESPIRATORY (INHALATION) at 13:57

## 2021-01-01 RX ADMIN — METOPROLOL SUCCINATE 12.5 MG: 25 TABLET, EXTENDED RELEASE ORAL at 08:34

## 2021-01-01 RX ADMIN — SODIUM CHLORIDE 10 ML: 9 INJECTION, SOLUTION INTRAMUSCULAR; INTRAVENOUS; SUBCUTANEOUS at 05:54

## 2021-01-01 RX ADMIN — HUMAN INSULIN 6 UNITS: 100 INJECTION, SOLUTION SUBCUTANEOUS at 20:07

## 2021-01-01 RX ADMIN — ALUMINUM HYDROXIDE, MAGNESIUM HYDROXIDE, DIMETHICONE 5 ML: 200; 200; 20 LIQUID ORAL at 11:35

## 2021-01-01 RX ADMIN — CEFTRIAXONE 2 G: 2 INJECTION, POWDER, FOR SOLUTION INTRAMUSCULAR; INTRAVENOUS at 18:18

## 2021-01-01 RX ADMIN — INSULIN GLARGINE 8 UNITS: 100 INJECTION, SOLUTION SUBCUTANEOUS at 22:13

## 2021-01-01 RX ADMIN — DEXMEDETOMIDINE HYDROCHLORIDE 1.3 MCG/KG/HR: 100 INJECTION, SOLUTION, CONCENTRATE INTRAVENOUS at 12:15

## 2021-01-01 RX ADMIN — RISPERIDONE 0.5 MG: 1 SOLUTION ORAL at 20:05

## 2021-01-01 RX ADMIN — HUMAN INSULIN 9 UNITS: 100 INJECTION, SOLUTION SUBCUTANEOUS at 16:30

## 2021-01-01 RX ADMIN — Medication 10 ML: at 21:27

## 2021-01-01 RX ADMIN — INSULIN GLARGINE 30 UNITS: 100 INJECTION, SOLUTION SUBCUTANEOUS at 21:21

## 2021-01-01 RX ADMIN — ALUMINUM HYDROXIDE, MAGNESIUM HYDROXIDE, DIMETHICONE 5 ML: 200; 200; 20 LIQUID ORAL at 23:22

## 2021-01-01 RX ADMIN — Medication 1 AMPULE: at 02:57

## 2021-01-01 RX ADMIN — FENTANYL CITRATE 50 MCG: 50 INJECTION INTRAMUSCULAR; INTRAVENOUS at 18:39

## 2021-01-01 RX ADMIN — FENTANYL CITRATE 200 MCG/HR: 0.05 INJECTION, SOLUTION INTRAMUSCULAR; INTRAVENOUS at 05:49

## 2021-01-01 RX ADMIN — ALBUTEROL SULFATE 2.5 MG: 2.5 SOLUTION RESPIRATORY (INHALATION) at 13:05

## 2021-01-01 RX ADMIN — MORPHINE SULFATE 2 MG: 2 INJECTION, SOLUTION INTRAMUSCULAR; INTRAVENOUS at 00:07

## 2021-01-01 RX ADMIN — FENTANYL CITRATE 50 MCG: 50 INJECTION INTRAMUSCULAR; INTRAVENOUS at 04:44

## 2021-01-01 RX ADMIN — Medication 1 AMPULE: at 21:19

## 2021-01-01 RX ADMIN — LEVETIRACETAM 500 MG: 100 INJECTION, SOLUTION INTRAVENOUS at 00:11

## 2021-01-01 RX ADMIN — DEXTROSE MONOHYDRATE 12.5 G: 500 INJECTION PARENTERAL at 06:33

## 2021-01-01 RX ADMIN — MORPHINE SULFATE 2 MG: 2 INJECTION, SOLUTION INTRAMUSCULAR; INTRAVENOUS at 05:00

## 2021-01-01 RX ADMIN — Medication 40 ML: at 01:01

## 2021-01-01 RX ADMIN — HYDRALAZINE HYDROCHLORIDE 10 MG: 10 TABLET, FILM COATED ORAL at 16:34

## 2021-01-01 RX ADMIN — CEFPODOXIME PROXETIL 200 MG: 200 TABLET, FILM COATED ORAL at 08:48

## 2021-01-01 RX ADMIN — RISPERIDONE 0.5 MG: 1 TABLET ORAL at 21:02

## 2021-01-01 RX ADMIN — FAMOTIDINE 20 MG: 10 INJECTION INTRAVENOUS at 08:46

## 2021-01-01 RX ADMIN — HYDROMORPHONE HYDROCHLORIDE 2 MG: 1 INJECTION, SOLUTION INTRAMUSCULAR; INTRAVENOUS; SUBCUTANEOUS at 21:22

## 2021-01-01 RX ADMIN — ALBUTEROL SULFATE 2.5 MG: 2.5 SOLUTION RESPIRATORY (INHALATION) at 19:40

## 2021-01-01 RX ADMIN — OXYCODONE 15 MG: 5 TABLET ORAL at 17:45

## 2021-01-01 RX ADMIN — DEXMEDETOMIDINE HYDROCHLORIDE 1.2 MCG/KG/HR: 100 INJECTION, SOLUTION, CONCENTRATE INTRAVENOUS at 06:27

## 2021-01-01 RX ADMIN — SODIUM CHLORIDE 10 ML: 9 INJECTION, SOLUTION INTRAMUSCULAR; INTRAVENOUS; SUBCUTANEOUS at 13:15

## 2021-01-01 RX ADMIN — CEFTRIAXONE 1 G: 1 INJECTION, POWDER, FOR SOLUTION INTRAMUSCULAR; INTRAVENOUS at 09:12

## 2021-01-01 RX ADMIN — FENTANYL CITRATE 100 MCG: 50 INJECTION, SOLUTION INTRAMUSCULAR; INTRAVENOUS at 14:29

## 2021-01-01 RX ADMIN — HEPARIN SODIUM 5000 UNITS: 5000 INJECTION INTRAVENOUS; SUBCUTANEOUS at 20:04

## 2021-01-01 RX ADMIN — DEXMEDETOMIDINE HYDROCHLORIDE 1 MCG/KG/HR: 100 INJECTION, SOLUTION, CONCENTRATE INTRAVENOUS at 21:44

## 2021-01-01 RX ADMIN — HEPARIN SODIUM 5000 UNITS: 5000 INJECTION INTRAVENOUS; SUBCUTANEOUS at 16:46

## 2021-01-01 RX ADMIN — Medication 150 MCG/HR: at 01:51

## 2021-01-01 RX ADMIN — DEXMEDETOMIDINE HYDROCHLORIDE 1.3 MCG/KG/HR: 100 INJECTION, SOLUTION, CONCENTRATE INTRAVENOUS at 21:15

## 2021-01-01 RX ADMIN — LINEZOLID 600 MG: 600 TABLET, FILM COATED ORAL at 09:34

## 2021-01-01 RX ADMIN — HYDRALAZINE HYDROCHLORIDE 10 MG: 10 TABLET, FILM COATED ORAL at 22:44

## 2021-01-01 RX ADMIN — SODIUM CHLORIDE 30 ML: 9 INJECTION, SOLUTION INTRAMUSCULAR; INTRAVENOUS; SUBCUTANEOUS at 14:00

## 2021-01-01 RX ADMIN — PANTOPRAZOLE SODIUM 40 MG: 40 INJECTION, POWDER, FOR SOLUTION INTRAVENOUS at 09:34

## 2021-01-01 RX ADMIN — INSULIN GLARGINE 14 UNITS: 100 INJECTION, SOLUTION SUBCUTANEOUS at 08:24

## 2021-01-01 RX ADMIN — DEXAMETHASONE SODIUM PHOSPHATE 6 MG: 10 INJECTION INTRAMUSCULAR; INTRAVENOUS at 12:00

## 2021-01-01 RX ADMIN — ENOXAPARIN SODIUM 30 MG: 30 INJECTION SUBCUTANEOUS at 09:09

## 2021-01-01 RX ADMIN — SODIUM CHLORIDE 40 MG: 9 INJECTION, SOLUTION INTRAMUSCULAR; INTRAVENOUS; SUBCUTANEOUS at 08:32

## 2021-01-01 RX ADMIN — HUMAN INSULIN 4 UNITS: 100 INJECTION, SOLUTION SUBCUTANEOUS at 11:43

## 2021-01-01 RX ADMIN — FUROSEMIDE 80 MG: 10 INJECTION, SOLUTION INTRAMUSCULAR; INTRAVENOUS at 08:51

## 2021-01-01 RX ADMIN — HUMAN INSULIN 3 UNITS: 100 INJECTION, SOLUTION SUBCUTANEOUS at 04:00

## 2021-01-01 RX ADMIN — SODIUM CHLORIDE 40 ML: 9 INJECTION, SOLUTION INTRAMUSCULAR; INTRAVENOUS; SUBCUTANEOUS at 05:46

## 2021-01-01 RX ADMIN — HYDRALAZINE HYDROCHLORIDE 10 MG: 20 INJECTION, SOLUTION INTRAMUSCULAR; INTRAVENOUS at 05:51

## 2021-01-01 RX ADMIN — FUROSEMIDE 40 MG: 10 INJECTION, SOLUTION INTRAMUSCULAR; INTRAVENOUS at 07:58

## 2021-01-01 RX ADMIN — ALBUTEROL SULFATE 2.5 MG: 2.5 SOLUTION RESPIRATORY (INHALATION) at 19:25

## 2021-01-01 RX ADMIN — Medication 1 AMPULE: at 08:22

## 2021-01-01 RX ADMIN — Medication 1 AMPULE: at 08:25

## 2021-01-01 RX ADMIN — GUANFACINE 1 MG: 1 TABLET ORAL at 07:59

## 2021-01-01 RX ADMIN — NOREPINEPHRINE-DEXTROSE IV SOLUTION 4 MG/250ML-5% 8 MCG/MIN: 4-5/250 SOLUTION at 03:20

## 2021-01-01 RX ADMIN — CEFTRIAXONE SODIUM 2 G: 2 INJECTION, POWDER, FOR SOLUTION INTRAMUSCULAR; INTRAVENOUS at 10:00

## 2021-01-01 RX ADMIN — FENTANYL CITRATE 50 MCG: 50 INJECTION INTRAMUSCULAR; INTRAVENOUS at 12:52

## 2021-01-01 RX ADMIN — DEXAMETHASONE SODIUM PHOSPHATE 10 MG: 10 INJECTION, SOLUTION INTRAMUSCULAR; INTRAVENOUS at 23:26

## 2021-01-01 RX ADMIN — INSULIN GLARGINE 8 UNITS: 100 INJECTION, SOLUTION SUBCUTANEOUS at 21:01

## 2021-01-01 RX ADMIN — Medication 1 AMPULE: at 08:49

## 2021-01-01 RX ADMIN — Medication 10 ML: at 22:21

## 2021-01-01 RX ADMIN — RDII 250 MG CAPSULE 250 MG: at 09:45

## 2021-01-01 RX ADMIN — Medication 1 AMPULE: at 20:22

## 2021-01-01 RX ADMIN — IPRATROPIUM BROMIDE AND ALBUTEROL SULFATE 3 ML: .5; 3 SOLUTION RESPIRATORY (INHALATION) at 20:43

## 2021-01-01 RX ADMIN — FAMOTIDINE 20 MG: 10 INJECTION INTRAVENOUS at 09:42

## 2021-01-01 RX ADMIN — Medication 1 AMPULE: at 22:44

## 2021-01-01 RX ADMIN — Medication 1 AMPULE: at 09:00

## 2021-01-01 RX ADMIN — DEXMEDETOMIDINE HYDROCHLORIDE 1.3 MCG/KG/HR: 100 INJECTION, SOLUTION INTRAVENOUS at 22:18

## 2021-01-01 RX ADMIN — HYDRALAZINE HYDROCHLORIDE 50 MG: 50 TABLET, FILM COATED ORAL at 23:26

## 2021-01-01 RX ADMIN — FENTANYL CITRATE 50 MCG: 50 INJECTION INTRAMUSCULAR; INTRAVENOUS at 05:59

## 2021-01-01 RX ADMIN — SODIUM CHLORIDE 40 ML: 9 INJECTION, SOLUTION INTRAMUSCULAR; INTRAVENOUS; SUBCUTANEOUS at 22:00

## 2021-01-01 RX ADMIN — HEPARIN SODIUM 5000 UNITS: 5000 INJECTION INTRAVENOUS; SUBCUTANEOUS at 23:30

## 2021-01-01 RX ADMIN — RDII 250 MG CAPSULE 250 MG: at 11:06

## 2021-01-01 RX ADMIN — Medication 1 AMPULE: at 21:50

## 2021-01-01 RX ADMIN — Medication 15 MG: at 13:05

## 2021-01-01 RX ADMIN — RISPERIDONE 0.5 MG: 1 TABLET, FILM COATED ORAL at 08:07

## 2021-01-01 RX ADMIN — ALBUTEROL SULFATE 2.5 MG: 2.5 SOLUTION RESPIRATORY (INHALATION) at 20:32

## 2021-01-01 RX ADMIN — Medication 1 AMPULE: at 21:31

## 2021-01-01 RX ADMIN — HEPARIN SODIUM 5000 UNITS: 5000 INJECTION INTRAVENOUS; SUBCUTANEOUS at 15:13

## 2021-01-01 RX ADMIN — Medication 10 ML: at 21:47

## 2021-01-01 RX ADMIN — INSULIN GLARGINE 14 UNITS: 100 INJECTION, SOLUTION SUBCUTANEOUS at 09:00

## 2021-01-01 RX ADMIN — DEXMEDETOMIDINE HYDROCHLORIDE 1.5 MCG/KG/HR: 100 INJECTION, SOLUTION INTRAVENOUS at 13:13

## 2021-01-01 RX ADMIN — HUMAN INSULIN 3 UNITS: 100 INJECTION, SOLUTION SUBCUTANEOUS at 18:15

## 2021-01-01 RX ADMIN — Medication 200 MCG/HR: at 12:02

## 2021-01-01 RX ADMIN — HUMAN INSULIN 3 UNITS: 100 INJECTION, SOLUTION SUBCUTANEOUS at 07:48

## 2021-01-01 RX ADMIN — INSULIN LISPRO 9 UNITS: 100 INJECTION, SOLUTION INTRAVENOUS; SUBCUTANEOUS at 05:04

## 2021-01-01 RX ADMIN — ALBUTEROL SULFATE 2.5 MG: 2.5 SOLUTION RESPIRATORY (INHALATION) at 14:43

## 2021-01-01 RX ADMIN — Medication 15 MG: at 08:55

## 2021-01-01 RX ADMIN — MIDAZOLAM 5 MG/HR: 5 INJECTION, SOLUTION INTRAMUSCULAR; INTRAVENOUS at 12:02

## 2021-01-01 RX ADMIN — ALBUTEROL SULFATE 2.5 MG: 2.5 SOLUTION RESPIRATORY (INHALATION) at 21:32

## 2021-01-01 RX ADMIN — Medication 10 ML: at 05:24

## 2021-01-01 RX ADMIN — OXYCODONE 15 MG: 5 TABLET ORAL at 17:28

## 2021-01-01 RX ADMIN — MIDAZOLAM 6 MG/HR: 5 INJECTION, SOLUTION INTRAMUSCULAR; INTRAVENOUS at 07:07

## 2021-01-01 RX ADMIN — IPRATROPIUM BROMIDE AND ALBUTEROL SULFATE 3 ML: .5; 3 SOLUTION RESPIRATORY (INHALATION) at 23:54

## 2021-01-01 RX ADMIN — MODAFINIL 100 MG: 100 TABLET ORAL at 08:20

## 2021-01-01 RX ADMIN — DEXMEDETOMIDINE HYDROCHLORIDE 1.4 MCG/KG/HR: 100 INJECTION, SOLUTION INTRAVENOUS at 06:15

## 2021-01-01 RX ADMIN — RISPERIDONE 1 MG: 1 SOLUTION ORAL at 09:04

## 2021-01-01 RX ADMIN — HEPARIN SODIUM 5000 UNITS: 5000 INJECTION INTRAVENOUS; SUBCUTANEOUS at 23:33

## 2021-01-01 RX ADMIN — SODIUM CHLORIDE 40 ML: 9 INJECTION, SOLUTION INTRAMUSCULAR; INTRAVENOUS; SUBCUTANEOUS at 05:43

## 2021-01-01 RX ADMIN — ALBUTEROL SULFATE 2.5 MG: 2.5 SOLUTION RESPIRATORY (INHALATION) at 14:02

## 2021-01-01 RX ADMIN — SODIUM CHLORIDE 10 ML: 9 INJECTION, SOLUTION INTRAMUSCULAR; INTRAVENOUS; SUBCUTANEOUS at 14:32

## 2021-01-01 RX ADMIN — ALBUTEROL SULFATE 2.5 MG: 2.5 SOLUTION RESPIRATORY (INHALATION) at 03:41

## 2021-01-01 RX ADMIN — INSULIN GLARGINE 20 UNITS: 100 INJECTION, SOLUTION SUBCUTANEOUS at 08:38

## 2021-01-01 RX ADMIN — INSULIN LISPRO 6 UNITS: 100 INJECTION, SOLUTION INTRAVENOUS; SUBCUTANEOUS at 17:29

## 2021-01-01 RX ADMIN — INSULIN LISPRO 4 UNITS: 100 INJECTION, SOLUTION INTRAVENOUS; SUBCUTANEOUS at 11:47

## 2021-01-01 RX ADMIN — LORAZEPAM 1 MG: 2 INJECTION INTRAMUSCULAR; INTRAVENOUS at 22:56

## 2021-01-01 RX ADMIN — POTASSIUM CHLORIDE 20 MEQ: 200 INJECTION, SOLUTION INTRAVENOUS at 09:48

## 2021-01-01 RX ADMIN — HUMAN INSULIN 3 UNITS: 100 INJECTION, SOLUTION SUBCUTANEOUS at 16:00

## 2021-01-01 RX ADMIN — FAMOTIDINE 20 MG: 20 TABLET, FILM COATED ORAL at 08:20

## 2021-01-01 RX ADMIN — HYDRALAZINE HYDROCHLORIDE 50 MG: 50 TABLET, FILM COATED ORAL at 17:20

## 2021-01-01 RX ADMIN — LORAZEPAM 1 MG: 2 INJECTION INTRAMUSCULAR; INTRAVENOUS at 03:28

## 2021-01-01 RX ADMIN — RISPERIDONE 1 MG: 1 SOLUTION ORAL at 20:54

## 2021-01-01 RX ADMIN — SODIUM CHLORIDE 10 ML: 9 INJECTION, SOLUTION INTRAMUSCULAR; INTRAVENOUS; SUBCUTANEOUS at 05:34

## 2021-01-01 RX ADMIN — CLOPIDOGREL BISULFATE 75 MG: 75 TABLET ORAL at 08:45

## 2021-01-01 RX ADMIN — ALBUTEROL SULFATE 2.5 MG: 2.5 SOLUTION RESPIRATORY (INHALATION) at 19:53

## 2021-01-01 RX ADMIN — DEXMEDETOMIDINE HYDROCHLORIDE 1.5 MCG/KG/HR: 100 INJECTION, SOLUTION INTRAVENOUS at 06:16

## 2021-01-01 RX ADMIN — LOSARTAN POTASSIUM 100 MG: 50 TABLET, FILM COATED ORAL at 09:54

## 2021-01-01 RX ADMIN — FAMOTIDINE 20 MG: 20 TABLET, FILM COATED ORAL at 08:27

## 2021-01-01 RX ADMIN — METOPROLOL TARTRATE 5 MG: 5 INJECTION INTRAVENOUS at 17:31

## 2021-01-01 RX ADMIN — LEVETIRACETAM 500 MG: 100 INJECTION, SOLUTION INTRAVENOUS at 11:02

## 2021-01-01 RX ADMIN — ACETAMINOPHEN 650 MG: 325 TABLET, FILM COATED ORAL at 00:28

## 2021-01-01 RX ADMIN — RDII 250 MG CAPSULE 250 MG: at 08:38

## 2021-01-01 RX ADMIN — ALBUTEROL SULFATE 2.5 MG: 2.5 SOLUTION RESPIRATORY (INHALATION) at 09:07

## 2021-01-01 RX ADMIN — HUMAN INSULIN 9 UNITS: 100 INJECTION, SOLUTION SUBCUTANEOUS at 00:00

## 2021-01-01 RX ADMIN — POLYETHYLENE GLYCOL 3350 17 G: 17 POWDER, FOR SOLUTION ORAL at 08:01

## 2021-01-01 RX ADMIN — ALBUTEROL SULFATE 2.5 MG: 2.5 SOLUTION RESPIRATORY (INHALATION) at 14:54

## 2021-01-01 RX ADMIN — SODIUM CHLORIDE 10 ML: 9 INJECTION, SOLUTION INTRAMUSCULAR; INTRAVENOUS; SUBCUTANEOUS at 05:45

## 2021-01-01 RX ADMIN — HYDRALAZINE HYDROCHLORIDE 10 MG: 10 TABLET, FILM COATED ORAL at 22:00

## 2021-01-01 RX ADMIN — Medication 1 AMPULE: at 08:47

## 2021-01-01 RX ADMIN — FUROSEMIDE 40 MG: 10 INJECTION, SOLUTION INTRAMUSCULAR; INTRAVENOUS at 09:45

## 2021-01-01 RX ADMIN — FUROSEMIDE 20 MG: 20 TABLET ORAL at 08:49

## 2021-01-01 RX ADMIN — HYDROMORPHONE HYDROCHLORIDE 2 MG: 1 INJECTION, SOLUTION INTRAMUSCULAR; INTRAVENOUS; SUBCUTANEOUS at 11:44

## 2021-01-01 RX ADMIN — FENTANYL CITRATE 50 MCG: 0.05 INJECTION, SOLUTION INTRAMUSCULAR; INTRAVENOUS at 16:12

## 2021-01-01 RX ADMIN — RDII 250 MG CAPSULE 250 MG: at 11:04

## 2021-01-01 RX ADMIN — SODIUM CHLORIDE 10 ML: 9 INJECTION, SOLUTION INTRAMUSCULAR; INTRAVENOUS; SUBCUTANEOUS at 21:30

## 2021-01-01 RX ADMIN — OXYCODONE HYDROCHLORIDE 15 MG: 5 TABLET ORAL at 17:14

## 2021-01-01 RX ADMIN — Medication 5 ML: at 06:19

## 2021-01-01 RX ADMIN — ALBUMIN (HUMAN) 25 G: 0.25 INJECTION, SOLUTION INTRAVENOUS at 00:09

## 2021-01-01 RX ADMIN — ALUMINUM HYDROXIDE, MAGNESIUM HYDROXIDE, DIMETHICONE 5 ML: 200; 200; 20 LIQUID ORAL at 12:00

## 2021-01-01 RX ADMIN — ALUMINUM HYDROXIDE, MAGNESIUM HYDROXIDE, DIMETHICONE 5 ML: 200; 200; 20 LIQUID ORAL at 23:48

## 2021-01-01 RX ADMIN — FAMOTIDINE 20 MG: 20 TABLET, FILM COATED ORAL at 09:48

## 2021-01-01 RX ADMIN — FENTANYL CITRATE 50 MCG: 0.05 INJECTION, SOLUTION INTRAMUSCULAR; INTRAVENOUS at 19:42

## 2021-01-01 RX ADMIN — LORAZEPAM 1 MG: 2 INJECTION INTRAMUSCULAR; INTRAVENOUS at 10:34

## 2021-01-01 RX ADMIN — INSULIN LISPRO 2 UNITS: 100 INJECTION, SOLUTION INTRAVENOUS; SUBCUTANEOUS at 16:25

## 2021-01-01 RX ADMIN — Medication 1 AMPULE: at 09:42

## 2021-01-01 RX ADMIN — HUMAN INSULIN 12 UNITS: 100 INJECTION, SOLUTION SUBCUTANEOUS at 20:00

## 2021-01-01 RX ADMIN — INSULIN GLARGINE 20 UNITS: 100 INJECTION, SOLUTION SUBCUTANEOUS at 21:29

## 2021-01-01 RX ADMIN — NOREPINEPHRINE BITARTRATE 8 MCG/MIN: 1 INJECTION, SOLUTION, CONCENTRATE INTRAVENOUS at 04:16

## 2021-01-01 RX ADMIN — RISPERIDONE 0.5 MG: 1 SOLUTION ORAL at 08:55

## 2021-01-01 RX ADMIN — Medication 1 AMPULE: at 08:21

## 2021-01-01 RX ADMIN — METHYLPREDNISOLONE SODIUM SUCCINATE 40 MG: 40 INJECTION, POWDER, FOR SOLUTION INTRAMUSCULAR; INTRAVENOUS at 08:05

## 2021-01-01 RX ADMIN — SODIUM CHLORIDE 75 ML/HR: 900 INJECTION, SOLUTION INTRAVENOUS at 17:19

## 2021-01-01 RX ADMIN — ROSUVASTATIN 20 MG: 20 TABLET, FILM COATED ORAL at 21:11

## 2021-01-01 RX ADMIN — Medication 10 ML: at 06:00

## 2021-01-01 RX ADMIN — RDII 250 MG CAPSULE 250 MG: at 08:14

## 2021-01-01 RX ADMIN — CEFTRIAXONE 2 G: 2 INJECTION, POWDER, FOR SOLUTION INTRAMUSCULAR; INTRAVENOUS at 17:01

## 2021-01-01 RX ADMIN — POLYETHYLENE GLYCOL 3350 17 G: 17 POWDER, FOR SOLUTION ORAL at 12:21

## 2021-01-01 RX ADMIN — ACETAMINOPHEN 650 MG: 325 TABLET, FILM COATED ORAL at 15:01

## 2021-01-01 RX ADMIN — FAMOTIDINE 20 MG: 10 INJECTION INTRAVENOUS at 08:47

## 2021-01-01 RX ADMIN — HEPARIN SODIUM 5000 UNITS: 5000 INJECTION INTRAVENOUS; SUBCUTANEOUS at 16:04

## 2021-01-01 RX ADMIN — Medication 10 ML: at 21:59

## 2021-01-01 RX ADMIN — FUROSEMIDE 40 MG: 10 INJECTION, SOLUTION INTRAMUSCULAR; INTRAVENOUS at 09:37

## 2021-01-01 RX ADMIN — CLOPIDOGREL BISULFATE 75 MG: 75 TABLET ORAL at 08:53

## 2021-01-01 RX ADMIN — SODIUM CHLORIDE, SODIUM LACTATE, POTASSIUM CHLORIDE, AND CALCIUM CHLORIDE 1000 ML: 600; 310; 30; 20 INJECTION, SOLUTION INTRAVENOUS at 02:50

## 2021-01-01 RX ADMIN — Medication 1 AMPULE: at 09:45

## 2021-01-01 RX ADMIN — HEPARIN SODIUM 5000 UNITS: 5000 INJECTION INTRAVENOUS; SUBCUTANEOUS at 23:24

## 2021-01-01 RX ADMIN — DEXMEDETOMIDINE HYDROCHLORIDE 1.5 MCG/KG/HR: 100 INJECTION, SOLUTION INTRAVENOUS at 05:39

## 2021-01-01 RX ADMIN — GABAPENTIN 100 MG: 100 CAPSULE ORAL at 09:19

## 2021-01-01 RX ADMIN — DEXAMETHASONE SODIUM PHOSPHATE 6 MG: 10 INJECTION INTRAMUSCULAR; INTRAVENOUS at 08:53

## 2021-01-01 RX ADMIN — DEXAMETHASONE SODIUM PHOSPHATE 2 MG: 10 INJECTION INTRAMUSCULAR; INTRAVENOUS at 09:23

## 2021-01-01 RX ADMIN — FUROSEMIDE 80 MG: 40 TABLET ORAL at 09:22

## 2021-01-01 RX ADMIN — HUMAN INSULIN 6 UNITS: 100 INJECTION, SOLUTION SUBCUTANEOUS at 21:01

## 2021-01-01 RX ADMIN — HEPARIN SODIUM 5000 UNITS: 5000 INJECTION INTRAVENOUS; SUBCUTANEOUS at 18:35

## 2021-01-01 RX ADMIN — DEXMEDETOMIDINE HYDROCHLORIDE 0.8 MCG/KG/HR: 100 INJECTION, SOLUTION INTRAVENOUS at 11:10

## 2021-01-01 RX ADMIN — HUMAN INSULIN 6 UNITS: 100 INJECTION, SOLUTION SUBCUTANEOUS at 16:41

## 2021-01-01 RX ADMIN — HUMAN INSULIN 3 UNITS: 100 INJECTION, SOLUTION SUBCUTANEOUS at 07:59

## 2021-01-01 RX ADMIN — HEPARIN SODIUM 5000 UNITS: 5000 INJECTION INTRAVENOUS; SUBCUTANEOUS at 21:42

## 2021-01-01 RX ADMIN — INSULIN LISPRO 2 UNITS: 100 INJECTION, SOLUTION INTRAVENOUS; SUBCUTANEOUS at 22:38

## 2021-01-01 RX ADMIN — RISPERIDONE 0.25 MG: 1 SOLUTION ORAL at 18:00

## 2021-01-01 RX ADMIN — DEXMEDETOMIDINE HYDROCHLORIDE 1.2 MCG/KG/HR: 100 INJECTION, SOLUTION, CONCENTRATE INTRAVENOUS at 06:13

## 2021-01-01 RX ADMIN — HUMAN INSULIN 12 UNITS: 100 INJECTION, SOLUTION SUBCUTANEOUS at 16:04

## 2021-01-01 RX ADMIN — DEXMEDETOMIDINE HYDROCHLORIDE 1.1 MCG/KG/HR: 100 INJECTION, SOLUTION INTRAVENOUS at 15:10

## 2021-01-01 RX ADMIN — CEFTRIAXONE SODIUM 2 G: 2 INJECTION, POWDER, FOR SOLUTION INTRAMUSCULAR; INTRAVENOUS at 10:07

## 2021-01-01 RX ADMIN — HEPARIN SODIUM 5000 UNITS: 5000 INJECTION INTRAVENOUS; SUBCUTANEOUS at 22:00

## 2021-01-01 RX ADMIN — SODIUM CHLORIDE SOLN NEBU 3% 4 ML: 3 NEBU SOLN at 20:32

## 2021-01-01 RX ADMIN — DEXTROSE MONOHYDRATE 25 G: 25 INJECTION, SOLUTION INTRAVENOUS at 12:31

## 2021-01-01 RX ADMIN — RISPERIDONE 1 MG: 1 SOLUTION ORAL at 20:58

## 2021-01-01 RX ADMIN — Medication 1 AMPULE: at 09:19

## 2021-01-01 RX ADMIN — HUMAN INSULIN 15 UNITS: 100 INJECTION, SOLUTION SUBCUTANEOUS at 06:30

## 2021-01-01 RX ADMIN — SODIUM CHLORIDE 40 ML: 9 INJECTION, SOLUTION INTRAMUSCULAR; INTRAVENOUS; SUBCUTANEOUS at 20:38

## 2021-01-01 RX ADMIN — ALBUTEROL SULFATE 2.5 MG: 2.5 SOLUTION RESPIRATORY (INHALATION) at 07:42

## 2021-01-01 RX ADMIN — OXYCODONE 10 MG: 5 TABLET ORAL at 11:09

## 2021-01-01 RX ADMIN — DEXMEDETOMIDINE HYDROCHLORIDE 0.2 MCG/KG/HR: 100 INJECTION, SOLUTION INTRAVENOUS at 21:03

## 2021-01-01 RX ADMIN — Medication 10 ML: at 06:03

## 2021-01-01 RX ADMIN — Medication 200 MCG/HR: at 06:46

## 2021-01-01 RX ADMIN — INSULIN HUMAN 2 UNITS: 100 INJECTION, SOLUTION PARENTERAL at 12:46

## 2021-01-01 RX ADMIN — MIDAZOLAM 8 MG/HR: 5 INJECTION, SOLUTION INTRAMUSCULAR; INTRAVENOUS at 23:03

## 2021-01-01 RX ADMIN — INSULIN LISPRO 3 UNITS: 100 INJECTION, SOLUTION INTRAVENOUS; SUBCUTANEOUS at 00:00

## 2021-01-01 RX ADMIN — HYDRALAZINE HYDROCHLORIDE 10 MG: 10 TABLET, FILM COATED ORAL at 15:55

## 2021-01-01 RX ADMIN — Medication 1 AMPULE: at 20:11

## 2021-01-01 RX ADMIN — OXYCODONE HYDROCHLORIDE 15 MG: 5 TABLET ORAL at 05:34

## 2021-01-01 RX ADMIN — LORAZEPAM 1 MG: 2 INJECTION INTRAMUSCULAR; INTRAVENOUS at 23:38

## 2021-01-01 RX ADMIN — SODIUM CHLORIDE 10 ML: 9 INJECTION, SOLUTION INTRAMUSCULAR; INTRAVENOUS; SUBCUTANEOUS at 14:52

## 2021-01-01 RX ADMIN — ALBUTEROL SULFATE 2.5 MG: 2.5 SOLUTION RESPIRATORY (INHALATION) at 20:47

## 2021-01-01 RX ADMIN — METOPROLOL TARTRATE 25 MG: 25 TABLET, FILM COATED ORAL at 20:43

## 2021-01-01 RX ADMIN — HYDRALAZINE HYDROCHLORIDE 10 MG: 10 TABLET, FILM COATED ORAL at 15:44

## 2021-01-01 RX ADMIN — Medication 5 ML: at 12:30

## 2021-01-01 RX ADMIN — OXYCODONE 5 MG: 5 TABLET ORAL at 11:59

## 2021-01-01 RX ADMIN — FUROSEMIDE 40 MG: 10 INJECTION, SOLUTION INTRAMUSCULAR; INTRAVENOUS at 08:58

## 2021-01-01 RX ADMIN — HUMAN INSULIN 3 UNITS: 100 INJECTION, SOLUTION SUBCUTANEOUS at 20:22

## 2021-01-01 RX ADMIN — FENTANYL CITRATE 50 MCG: 50 INJECTION INTRAMUSCULAR; INTRAVENOUS at 11:36

## 2021-01-01 RX ADMIN — BUDESONIDE 500 MCG: 0.5 INHALANT RESPIRATORY (INHALATION) at 07:21

## 2021-01-01 RX ADMIN — FAMOTIDINE 20 MG: 20 TABLET, FILM COATED ORAL at 08:01

## 2021-01-01 RX ADMIN — DEXMEDETOMIDINE HYDROCHLORIDE 1.4 MCG/KG/HR: 100 INJECTION, SOLUTION, CONCENTRATE INTRAVENOUS at 05:21

## 2021-01-01 RX ADMIN — ALBUMIN (HUMAN) 25 G: 0.25 INJECTION, SOLUTION INTRAVENOUS at 17:38

## 2021-01-01 RX ADMIN — SODIUM CHLORIDE 75 ML/HR: 900 INJECTION, SOLUTION INTRAVENOUS at 16:39

## 2021-01-01 RX ADMIN — GABAPENTIN 100 MG: 100 CAPSULE ORAL at 21:09

## 2021-01-01 RX ADMIN — PANTOPRAZOLE SODIUM 40 MG: 40 INJECTION, POWDER, FOR SOLUTION INTRAVENOUS at 09:00

## 2021-01-01 RX ADMIN — INSULIN LISPRO 6 UNITS: 100 INJECTION, SOLUTION INTRAVENOUS; SUBCUTANEOUS at 11:23

## 2021-01-01 RX ADMIN — CLOPIDOGREL BISULFATE 75 MG: 75 TABLET ORAL at 08:38

## 2021-01-01 RX ADMIN — RISPERIDONE 0.5 MG: 1 TABLET, FILM COATED ORAL at 17:06

## 2021-01-01 RX ADMIN — Medication 5 ML: at 05:13

## 2021-01-01 RX ADMIN — FENTANYL CITRATE 50 MCG: 50 INJECTION INTRAMUSCULAR; INTRAVENOUS at 01:20

## 2021-01-01 RX ADMIN — SODIUM CHLORIDE 10 ML: 9 INJECTION, SOLUTION INTRAMUSCULAR; INTRAVENOUS; SUBCUTANEOUS at 12:05

## 2021-01-01 RX ADMIN — ALBUTEROL SULFATE 2.5 MG: 2.5 SOLUTION RESPIRATORY (INHALATION) at 08:25

## 2021-01-01 RX ADMIN — SODIUM CHLORIDE 20 ML: 9 INJECTION, SOLUTION INTRAMUSCULAR; INTRAVENOUS; SUBCUTANEOUS at 21:00

## 2021-01-01 RX ADMIN — LORAZEPAM 1 MG: 2 INJECTION INTRAMUSCULAR; INTRAVENOUS at 16:57

## 2021-01-01 RX ADMIN — OXYCODONE 10 MG: 5 TABLET ORAL at 05:30

## 2021-01-01 RX ADMIN — GABAPENTIN 100 MG: 100 CAPSULE ORAL at 15:59

## 2021-01-01 RX ADMIN — METOPROLOL SUCCINATE 12.5 MG: 25 TABLET, EXTENDED RELEASE ORAL at 09:34

## 2021-01-01 RX ADMIN — Medication 1 AMPULE: at 08:43

## 2021-01-01 RX ADMIN — HEPARIN SODIUM 5000 UNITS: 5000 INJECTION INTRAVENOUS; SUBCUTANEOUS at 10:01

## 2021-01-01 RX ADMIN — Medication 1 EACH: at 17:32

## 2021-01-01 RX ADMIN — METHYLPREDNISOLONE SODIUM SUCCINATE 40 MG: 40 INJECTION, POWDER, FOR SOLUTION INTRAMUSCULAR; INTRAVENOUS at 09:17

## 2021-01-01 RX ADMIN — LABETALOL HYDROCHLORIDE 10 MG: 5 INJECTION INTRAVENOUS at 12:50

## 2021-01-01 RX ADMIN — LIDOCAINE HYDROCHLORIDE 60 MG: 20 INJECTION, SOLUTION EPIDURAL; INFILTRATION; INTRACAUDAL; PERINEURAL at 11:07

## 2021-01-01 RX ADMIN — HUMAN INSULIN 3 UNITS: 100 INJECTION, SOLUTION SUBCUTANEOUS at 04:43

## 2021-01-01 RX ADMIN — HYDRALAZINE HYDROCHLORIDE 20 MG: 20 INJECTION INTRAMUSCULAR; INTRAVENOUS at 22:20

## 2021-01-01 RX ADMIN — RDII 250 MG CAPSULE 250 MG: at 10:00

## 2021-01-01 RX ADMIN — INSULIN LISPRO 2 UNITS: 100 INJECTION, SOLUTION INTRAVENOUS; SUBCUTANEOUS at 11:36

## 2021-01-01 RX ADMIN — Medication 1 AMPULE: at 22:00

## 2021-01-01 RX ADMIN — BUDESONIDE 500 MCG: 0.5 INHALANT RESPIRATORY (INHALATION) at 07:27

## 2021-01-01 RX ADMIN — INSULIN HUMAN 4 UNITS: 100 INJECTION, SOLUTION PARENTERAL at 05:00

## 2021-01-01 RX ADMIN — Medication 10 ML: at 15:04

## 2021-01-01 RX ADMIN — SODIUM CHLORIDE 40 ML: 9 INJECTION, SOLUTION INTRAMUSCULAR; INTRAVENOUS; SUBCUTANEOUS at 05:57

## 2021-01-01 RX ADMIN — FAMOTIDINE 20 MG: 20 TABLET, FILM COATED ORAL at 08:30

## 2021-01-01 RX ADMIN — ALBUTEROL SULFATE 2.5 MG: 2.5 SOLUTION RESPIRATORY (INHALATION) at 04:11

## 2021-01-01 RX ADMIN — Medication 10 MG: at 11:21

## 2021-01-01 RX ADMIN — DEXMEDETOMIDINE HYDROCHLORIDE 1 MCG/KG/HR: 100 INJECTION, SOLUTION, CONCENTRATE INTRAVENOUS at 11:03

## 2021-01-01 RX ADMIN — MEROPENEM 500 MG: 500 INJECTION, POWDER, FOR SOLUTION INTRAVENOUS at 23:18

## 2021-01-01 RX ADMIN — INSULIN GLARGINE 20 UNITS: 100 INJECTION, SOLUTION SUBCUTANEOUS at 08:45

## 2021-01-01 RX ADMIN — LORAZEPAM 2 MG: 2 INJECTION INTRAMUSCULAR; INTRAVENOUS at 15:19

## 2021-01-01 RX ADMIN — DEXAMETHASONE SODIUM PHOSPHATE 6 MG: 4 INJECTION, SOLUTION INTRAMUSCULAR; INTRAVENOUS at 08:02

## 2021-01-01 RX ADMIN — DEXTROSE MONOHYDRATE 50 ML/HR: 5 INJECTION, SOLUTION INTRAVENOUS at 02:10

## 2021-01-01 RX ADMIN — HYDRALAZINE HYDROCHLORIDE 10 MG: 10 TABLET, FILM COATED ORAL at 16:33

## 2021-01-01 RX ADMIN — HUMAN INSULIN 9 UNITS: 100 INJECTION, SOLUTION SUBCUTANEOUS at 00:33

## 2021-01-01 RX ADMIN — ALUMINUM HYDROXIDE, MAGNESIUM HYDROXIDE, DIMETHICONE 5 ML: 200; 200; 20 LIQUID ORAL at 00:41

## 2021-01-01 RX ADMIN — DOPAMINE HYDROCHLORIDE 10 MCG/KG/MIN: 320 INJECTION, SOLUTION INTRAVENOUS at 03:30

## 2021-01-01 RX ADMIN — DEXMEDETOMIDINE HYDROCHLORIDE 1.5 MCG/KG/HR: 100 INJECTION, SOLUTION INTRAVENOUS at 02:32

## 2021-01-01 RX ADMIN — FUROSEMIDE 80 MG: 10 INJECTION, SOLUTION INTRAMUSCULAR; INTRAVENOUS at 11:07

## 2021-01-01 RX ADMIN — SODIUM CHLORIDE, SODIUM LACTATE, POTASSIUM CHLORIDE, AND CALCIUM CHLORIDE: 600; 310; 30; 20 INJECTION, SOLUTION INTRAVENOUS at 08:37

## 2021-01-01 RX ADMIN — ASPIRIN 81 MG: 81 TABLET ORAL at 09:34

## 2021-01-01 RX ADMIN — DEXAMETHASONE SODIUM PHOSPHATE 6 MG: 10 INJECTION INTRAMUSCULAR; INTRAVENOUS at 12:18

## 2021-01-01 RX ADMIN — HEPARIN SODIUM 5000 UNITS: 1000 INJECTION INTRAVENOUS; SUBCUTANEOUS at 15:46

## 2021-01-01 RX ADMIN — SODIUM CHLORIDE 10 ML: 9 INJECTION, SOLUTION INTRAMUSCULAR; INTRAVENOUS; SUBCUTANEOUS at 06:25

## 2021-01-01 RX ADMIN — HUMAN INSULIN 3 UNITS: 100 INJECTION, SOLUTION SUBCUTANEOUS at 04:16

## 2021-01-01 RX ADMIN — SODIUM ZIRCONIUM CYCLOSILICATE 10 G: 10 POWDER, FOR SUSPENSION ORAL at 17:11

## 2021-01-01 RX ADMIN — OXYCODONE 10 MG: 5 TABLET ORAL at 12:03

## 2021-01-01 RX ADMIN — SODIUM CHLORIDE SOLN NEBU 3% 4 ML: 3 NEBU SOLN at 08:20

## 2021-01-01 RX ADMIN — FUROSEMIDE 40 MG: 10 INJECTION, SOLUTION INTRAMUSCULAR; INTRAVENOUS at 18:04

## 2021-01-01 RX ADMIN — Medication 1 AMPULE: at 21:02

## 2021-01-01 RX ADMIN — DEXMEDETOMIDINE HYDROCHLORIDE 1.3 MCG/KG/HR: 100 INJECTION, SOLUTION, CONCENTRATE INTRAVENOUS at 04:00

## 2021-01-01 RX ADMIN — HUMAN INSULIN 4 UNITS: 100 INJECTION, SOLUTION SUBCUTANEOUS at 17:39

## 2021-01-01 RX ADMIN — RISPERIDONE 1 MG: 1 SOLUTION ORAL at 14:05

## 2021-01-01 RX ADMIN — ALBUTEROL SULFATE 2.5 MG: 2.5 SOLUTION RESPIRATORY (INHALATION) at 15:03

## 2021-01-01 RX ADMIN — ALBUMIN (HUMAN) 12.5 G: 0.25 INJECTION, SOLUTION INTRAVENOUS at 10:14

## 2021-01-01 RX ADMIN — DEXMEDETOMIDINE HYDROCHLORIDE 0.4 MCG/KG/HR: 100 INJECTION, SOLUTION INTRAVENOUS at 10:46

## 2021-01-01 RX ADMIN — MODAFINIL 100 MG: 100 TABLET ORAL at 08:01

## 2021-01-01 RX ADMIN — SODIUM CHLORIDE 10 ML: 9 INJECTION, SOLUTION INTRAMUSCULAR; INTRAVENOUS; SUBCUTANEOUS at 05:01

## 2021-01-01 RX ADMIN — ACETAMINOPHEN 650 MG: 325 TABLET ORAL at 06:27

## 2021-01-01 RX ADMIN — ALBUTEROL SULFATE 2.5 MG: 2.5 SOLUTION RESPIRATORY (INHALATION) at 00:05

## 2021-01-01 RX ADMIN — Medication 10 ML: at 06:24

## 2021-01-01 RX ADMIN — RISPERIDONE 1 MG: 1 SOLUTION ORAL at 21:15

## 2021-01-01 RX ADMIN — DEXMEDETOMIDINE HYDROCHLORIDE 0.8 MCG/KG/HR: 100 INJECTION, SOLUTION, CONCENTRATE INTRAVENOUS at 07:15

## 2021-01-01 RX ADMIN — HEPARIN SODIUM 5000 UNITS: 5000 INJECTION INTRAVENOUS; SUBCUTANEOUS at 12:09

## 2021-01-01 RX ADMIN — HEPARIN SODIUM 5000 UNITS: 5000 INJECTION INTRAVENOUS; SUBCUTANEOUS at 21:21

## 2021-01-01 RX ADMIN — RISPERIDONE 0.5 MG: 1 TABLET, FILM COATED ORAL at 08:25

## 2021-01-01 RX ADMIN — ALBUTEROL SULFATE 2.5 MG: 2.5 SOLUTION RESPIRATORY (INHALATION) at 22:53

## 2021-01-01 RX ADMIN — ALBUTEROL SULFATE 2.5 MG: 2.5 SOLUTION RESPIRATORY (INHALATION) at 08:23

## 2021-01-01 RX ADMIN — INSULIN GLARGINE 30 UNITS: 100 INJECTION, SOLUTION SUBCUTANEOUS at 21:26

## 2021-01-01 RX ADMIN — HYDRALAZINE HYDROCHLORIDE 10 MG: 10 TABLET, FILM COATED ORAL at 22:06

## 2021-01-01 RX ADMIN — ALBUMIN (HUMAN) 25 G: 0.25 INJECTION, SOLUTION INTRAVENOUS at 07:47

## 2021-01-01 RX ADMIN — LANSOPRAZOLE 30 MG: KIT at 07:30

## 2021-01-01 RX ADMIN — CEFTRIAXONE 1 G: 1 INJECTION, POWDER, FOR SOLUTION INTRAMUSCULAR; INTRAVENOUS at 16:07

## 2021-01-01 RX ADMIN — DEXMEDETOMIDINE HYDROCHLORIDE 1 MCG/KG/HR: 100 INJECTION, SOLUTION INTRAVENOUS at 22:23

## 2021-01-01 RX ADMIN — HUMAN INSULIN 6 UNITS: 100 INJECTION, SOLUTION SUBCUTANEOUS at 23:59

## 2021-01-01 RX ADMIN — ALBUMIN (HUMAN) 25 G: 0.25 INJECTION, SOLUTION INTRAVENOUS at 23:48

## 2021-01-01 RX ADMIN — Medication 10 ML: at 16:00

## 2021-01-01 RX ADMIN — FENTANYL CITRATE 50 MCG: 0.05 INJECTION, SOLUTION INTRAMUSCULAR; INTRAVENOUS at 06:42

## 2021-01-01 RX ADMIN — HEPARIN SODIUM 5000 UNITS: 1000 INJECTION INTRAVENOUS; SUBCUTANEOUS at 08:24

## 2021-01-01 RX ADMIN — HUMAN INSULIN 3 UNITS: 100 INJECTION, SOLUTION SUBCUTANEOUS at 08:22

## 2021-01-01 RX ADMIN — SODIUM CHLORIDE 10 ML: 9 INJECTION, SOLUTION INTRAMUSCULAR; INTRAVENOUS; SUBCUTANEOUS at 13:56

## 2021-01-01 RX ADMIN — HUMAN INSULIN 6 UNITS: 100 INJECTION, SOLUTION SUBCUTANEOUS at 22:34

## 2021-01-01 RX ADMIN — INSULIN LISPRO 6 UNITS: 100 INJECTION, SOLUTION INTRAVENOUS; SUBCUTANEOUS at 06:35

## 2021-01-01 RX ADMIN — ALBUTEROL SULFATE 2.5 MG: 2.5 SOLUTION RESPIRATORY (INHALATION) at 21:16

## 2021-01-01 RX ADMIN — CLOPIDOGREL BISULFATE 75 MG: 75 TABLET ORAL at 09:09

## 2021-01-01 RX ADMIN — Medication 1 AMPULE: at 21:56

## 2021-01-01 RX ADMIN — HEPARIN SODIUM 5000 UNITS: 5000 INJECTION INTRAVENOUS; SUBCUTANEOUS at 15:25

## 2021-01-01 RX ADMIN — HEPARIN SODIUM 5000 UNITS: 5000 INJECTION INTRAVENOUS; SUBCUTANEOUS at 08:46

## 2021-01-01 RX ADMIN — PANTOPRAZOLE SODIUM 40 MG: 40 INJECTION, POWDER, FOR SOLUTION INTRAVENOUS at 08:20

## 2021-01-01 RX ADMIN — ACETAZOLAMIDE 500 MG: 250 TABLET ORAL at 22:00

## 2021-01-01 RX ADMIN — Medication 0.5 MG: at 23:00

## 2021-01-01 RX ADMIN — MEROPENEM 500 MG: 500 INJECTION, POWDER, FOR SOLUTION INTRAVENOUS at 15:32

## 2021-01-01 RX ADMIN — INSULIN GLARGINE 30 UNITS: 100 INJECTION, SOLUTION SUBCUTANEOUS at 22:00

## 2021-01-01 RX ADMIN — FUROSEMIDE 40 MG: 10 INJECTION, SOLUTION INTRAMUSCULAR; INTRAVENOUS at 08:18

## 2021-01-01 RX ADMIN — DEXMEDETOMIDINE HYDROCHLORIDE 1 MCG/KG/HR: 100 INJECTION, SOLUTION INTRAVENOUS at 03:06

## 2021-01-01 RX ADMIN — SODIUM CHLORIDE SOLN NEBU 3% 4 ML: 3 NEBU SOLN at 09:29

## 2021-01-01 RX ADMIN — METOPROLOL TARTRATE 12.5 MG: 25 TABLET, FILM COATED ORAL at 08:36

## 2021-01-01 RX ADMIN — RDII 250 MG CAPSULE 250 MG: at 09:03

## 2021-01-01 RX ADMIN — HYDRALAZINE HYDROCHLORIDE 20 MG: 20 INJECTION INTRAMUSCULAR; INTRAVENOUS at 22:40

## 2021-01-01 RX ADMIN — DEXMEDETOMIDINE HYDROCHLORIDE 0.4 MCG/KG/HR: 100 INJECTION, SOLUTION INTRAVENOUS at 05:39

## 2021-01-01 RX ADMIN — RACEPINEPHRINE HYDROCHLORIDE 0.5 ML: 11.25 SOLUTION RESPIRATORY (INHALATION) at 23:42

## 2021-01-01 RX ADMIN — MIDAZOLAM 10 MG/HR: 5 INJECTION, SOLUTION INTRAMUSCULAR; INTRAVENOUS at 11:22

## 2021-01-01 RX ADMIN — FAMOTIDINE 20 MG: 10 INJECTION INTRAVENOUS at 09:23

## 2021-01-01 RX ADMIN — CLOPIDOGREL BISULFATE 75 MG: 75 TABLET ORAL at 07:58

## 2021-01-01 RX ADMIN — BISACODYL 10 MG: 10 SUPPOSITORY RECTAL at 08:43

## 2021-01-01 RX ADMIN — RISPERIDONE 1 MG: 1 SOLUTION ORAL at 09:15

## 2021-01-01 RX ADMIN — FENTANYL CITRATE 50 MCG: 0.05 INJECTION, SOLUTION INTRAMUSCULAR; INTRAVENOUS at 09:55

## 2021-01-01 RX ADMIN — FAMOTIDINE 20 MG: 20 TABLET, FILM COATED ORAL at 08:35

## 2021-01-01 RX ADMIN — MORPHINE SULFATE 2 MG: 2 INJECTION, SOLUTION INTRAMUSCULAR; INTRAVENOUS at 19:53

## 2021-01-01 RX ADMIN — DEXTROSE MONOHYDRATE 25 G: 500 INJECTION PARENTERAL at 00:26

## 2021-01-01 RX ADMIN — ALBUTEROL SULFATE 2.5 MG: 2.5 SOLUTION RESPIRATORY (INHALATION) at 02:42

## 2021-01-01 RX ADMIN — ALBUTEROL SULFATE 2.5 MG: 2.5 SOLUTION RESPIRATORY (INHALATION) at 14:20

## 2021-01-01 RX ADMIN — DEXMEDETOMIDINE HYDROCHLORIDE 0.4 MCG/KG/HR: 100 INJECTION, SOLUTION INTRAVENOUS at 01:27

## 2021-01-01 RX ADMIN — INSULIN HUMAN 8 UNITS: 100 INJECTION, SOLUTION PARENTERAL at 20:06

## 2021-01-01 RX ADMIN — HEPARIN SODIUM 5000 UNITS: 5000 INJECTION INTRAVENOUS; SUBCUTANEOUS at 23:31

## 2021-01-01 RX ADMIN — DEXMEDETOMIDINE HYDROCHLORIDE 1.3 MCG/KG/HR: 100 INJECTION, SOLUTION INTRAVENOUS at 17:33

## 2021-01-01 RX ADMIN — Medication 1 AMPULE: at 21:15

## 2021-01-01 RX ADMIN — INSULIN LISPRO 4 UNITS: 100 INJECTION, SOLUTION INTRAVENOUS; SUBCUTANEOUS at 11:30

## 2021-01-01 RX ADMIN — Medication 200 MCG/HR: at 19:54

## 2021-01-01 RX ADMIN — HYDROMORPHONE HYDROCHLORIDE 2 MG: 1 INJECTION, SOLUTION INTRAMUSCULAR; INTRAVENOUS; SUBCUTANEOUS at 00:58

## 2021-01-01 RX ADMIN — SODIUM CHLORIDE 10 ML: 9 INJECTION, SOLUTION INTRAMUSCULAR; INTRAVENOUS; SUBCUTANEOUS at 22:48

## 2021-01-01 RX ADMIN — FUROSEMIDE 40 MG: 10 INJECTION, SOLUTION INTRAMUSCULAR; INTRAVENOUS at 15:42

## 2021-01-01 RX ADMIN — SODIUM POLYSTYRENE SULFONATE 15 G: 15 SUSPENSION ORAL; RECTAL at 19:42

## 2021-01-01 RX ADMIN — HYDROMORPHONE HYDROCHLORIDE 2 MG: 1 INJECTION, SOLUTION INTRAMUSCULAR; INTRAVENOUS; SUBCUTANEOUS at 01:46

## 2021-01-01 RX ADMIN — INSULIN GLARGINE 8 UNITS: 100 INJECTION, SOLUTION SUBCUTANEOUS at 21:33

## 2021-01-01 RX ADMIN — Medication 10 ML: at 14:01

## 2021-01-01 RX ADMIN — ALBUTEROL SULFATE 2.5 MG: 2.5 SOLUTION RESPIRATORY (INHALATION) at 03:19

## 2021-01-01 RX ADMIN — MIDAZOLAM 3 MG/HR: 5 INJECTION, SOLUTION INTRAMUSCULAR; INTRAVENOUS at 19:15

## 2021-01-01 RX ADMIN — LINEZOLID 600 MG: 600 INJECTION, SOLUTION INTRAVENOUS at 20:51

## 2021-01-01 RX ADMIN — ALBUTEROL SULFATE 2.5 MG: 2.5 SOLUTION RESPIRATORY (INHALATION) at 15:51

## 2021-01-01 RX ADMIN — ROPINIROLE HYDROCHLORIDE 0.5 MG: 0.5 TABLET, FILM COATED ORAL at 21:56

## 2021-01-01 RX ADMIN — INSULIN HUMAN 6 UNITS: 100 INJECTION, SOLUTION PARENTERAL at 12:23

## 2021-01-01 RX ADMIN — Medication 1000 MG: at 08:16

## 2021-01-01 RX ADMIN — SODIUM CHLORIDE 10 ML: 9 INJECTION, SOLUTION INTRAMUSCULAR; INTRAVENOUS; SUBCUTANEOUS at 05:12

## 2021-01-01 RX ADMIN — LINEZOLID 600 MG: 600 TABLET, FILM COATED ORAL at 21:13

## 2021-01-01 RX ADMIN — MEROPENEM 500 MG: 500 INJECTION, POWDER, FOR SOLUTION INTRAVENOUS at 08:28

## 2021-01-01 RX ADMIN — INSULIN HUMAN 2 UNITS: 100 INJECTION, SOLUTION PARENTERAL at 23:20

## 2021-01-01 RX ADMIN — HUMAN INSULIN 9 UNITS: 100 INJECTION, SOLUTION SUBCUTANEOUS at 05:35

## 2021-01-01 RX ADMIN — ALUMINUM HYDROXIDE, MAGNESIUM HYDROXIDE, DIMETHICONE 5 ML: 200; 200; 20 LIQUID ORAL at 05:48

## 2021-01-01 RX ADMIN — LOPERAMIDE HCL 2 MG: 1 SOLUTION ORAL at 05:54

## 2021-01-01 RX ADMIN — Medication 10 ML: at 22:00

## 2021-01-01 RX ADMIN — DEXMEDETOMIDINE HYDROCHLORIDE 1.5 MCG/KG/HR: 100 INJECTION, SOLUTION INTRAVENOUS at 18:03

## 2021-01-01 RX ADMIN — PANTOPRAZOLE SODIUM 40 MG: 40 TABLET, DELAYED RELEASE ORAL at 05:51

## 2021-01-01 RX ADMIN — HYDROMORPHONE HYDROCHLORIDE 2 MG: 1 INJECTION, SOLUTION INTRAMUSCULAR; INTRAVENOUS; SUBCUTANEOUS at 01:20

## 2021-01-01 RX ADMIN — SODIUM CHLORIDE 10 ML: 9 INJECTION, SOLUTION INTRAMUSCULAR; INTRAVENOUS; SUBCUTANEOUS at 21:34

## 2021-01-01 RX ADMIN — MIDAZOLAM 6 MG/HR: 5 INJECTION, SOLUTION INTRAMUSCULAR; INTRAVENOUS at 23:39

## 2021-01-01 RX ADMIN — SODIUM CHLORIDE 40 ML: 9 INJECTION, SOLUTION INTRAMUSCULAR; INTRAVENOUS; SUBCUTANEOUS at 06:58

## 2021-01-01 RX ADMIN — LIDOCAINE HYDROCHLORIDE 60 MG: 20 INJECTION, SOLUTION EPIDURAL; INFILTRATION; INTRACAUDAL; PERINEURAL at 12:52

## 2021-01-01 RX ADMIN — Medication 220 MG: at 08:56

## 2021-01-01 RX ADMIN — MIDAZOLAM 4 MG/HR: 5 INJECTION, SOLUTION INTRAMUSCULAR; INTRAVENOUS at 16:49

## 2021-01-01 RX ADMIN — FENTANYL CITRATE 50 MCG/HR: 50 INJECTION, SOLUTION INTRAMUSCULAR; INTRAVENOUS at 22:28

## 2021-01-01 RX ADMIN — INSULIN GLARGINE 3 UNITS: 100 INJECTION, SOLUTION SUBCUTANEOUS at 14:43

## 2021-01-01 RX ADMIN — PIPERACILLIN SODIUM AND TAZOBACTAM SODIUM 3.38 G: 3; .375 INJECTION, POWDER, LYOPHILIZED, FOR SOLUTION INTRAVENOUS at 04:50

## 2021-01-01 RX ADMIN — FUROSEMIDE 80 MG: 10 INJECTION, SOLUTION INTRAMUSCULAR; INTRAVENOUS at 08:10

## 2021-01-01 RX ADMIN — ALBUTEROL SULFATE 2.5 MG: 2.5 SOLUTION RESPIRATORY (INHALATION) at 02:05

## 2021-01-01 RX ADMIN — OXYCODONE 10 MG: 5 TABLET ORAL at 11:16

## 2021-01-01 RX ADMIN — HUMAN INSULIN 12 UNITS: 100 INJECTION, SOLUTION SUBCUTANEOUS at 21:30

## 2021-01-01 RX ADMIN — HEPARIN SODIUM 5000 UNITS: 5000 INJECTION INTRAVENOUS; SUBCUTANEOUS at 20:05

## 2021-01-01 RX ADMIN — Medication 1 AMPULE: at 22:11

## 2021-01-01 RX ADMIN — HEPARIN SODIUM 5000 UNITS: 5000 INJECTION INTRAVENOUS; SUBCUTANEOUS at 07:18

## 2021-01-01 RX ADMIN — DEXTROSE MONOHYDRATE 25 ML/HR: 5 INJECTION, SOLUTION INTRAVENOUS at 07:47

## 2021-01-01 RX ADMIN — HUMAN INSULIN 2 UNITS: 100 INJECTION, SOLUTION SUBCUTANEOUS at 23:50

## 2021-01-01 RX ADMIN — Medication 1 AMPULE: at 20:24

## 2021-01-01 RX ADMIN — HEPARIN SODIUM 5000 UNITS: 5000 INJECTION INTRAVENOUS; SUBCUTANEOUS at 08:21

## 2021-01-01 RX ADMIN — METHADONE HYDROCHLORIDE 10 MG: 5 TABLET ORAL at 08:19

## 2021-01-01 RX ADMIN — RISPERIDONE 0.5 MG: 1 SOLUTION ORAL at 09:45

## 2021-01-01 RX ADMIN — HUMAN INSULIN 3 UNITS: 100 INJECTION, SOLUTION SUBCUTANEOUS at 06:11

## 2021-01-01 RX ADMIN — IPRATROPIUM BROMIDE AND ALBUTEROL SULFATE 3 ML: .5; 3 SOLUTION RESPIRATORY (INHALATION) at 11:48

## 2021-01-01 RX ADMIN — LABETALOL HYDROCHLORIDE 10 MG: 5 INJECTION INTRAVENOUS at 17:54

## 2021-01-01 RX ADMIN — RDII 250 MG CAPSULE 250 MG: at 17:53

## 2021-01-01 RX ADMIN — HUMAN INSULIN 2 UNITS: 100 INJECTION, SOLUTION SUBCUTANEOUS at 05:46

## 2021-01-01 RX ADMIN — OXYCODONE 15 MG: 5 TABLET ORAL at 17:09

## 2021-01-01 RX ADMIN — HUMAN INSULIN 4 UNITS: 100 INJECTION, SOLUTION SUBCUTANEOUS at 16:18

## 2021-01-01 RX ADMIN — SODIUM CHLORIDE 100 ML: 900 INJECTION, SOLUTION INTRAVENOUS at 14:57

## 2021-01-01 RX ADMIN — ALBUTEROL SULFATE 2.5 MG: 2.5 SOLUTION RESPIRATORY (INHALATION) at 13:30

## 2021-01-01 RX ADMIN — HYDRALAZINE HYDROCHLORIDE 10 MG: 10 TABLET, FILM COATED ORAL at 08:28

## 2021-01-01 RX ADMIN — PANTOPRAZOLE SODIUM 40 MG: 40 TABLET, DELAYED RELEASE ORAL at 05:46

## 2021-01-01 RX ADMIN — OXYCODONE 15 MG: 5 TABLET ORAL at 23:03

## 2021-01-01 RX ADMIN — LINEZOLID 600 MG: 600 INJECTION, SOLUTION INTRAVENOUS at 08:27

## 2021-01-01 RX ADMIN — Medication 5 ML: at 04:12

## 2021-01-01 RX ADMIN — ROSUVASTATIN 20 MG: 20 TABLET, FILM COATED ORAL at 21:00

## 2021-01-01 RX ADMIN — HEPARIN SODIUM 15 UNITS/KG/HR: 5000 INJECTION, SOLUTION INTRAVENOUS at 19:53

## 2021-01-01 RX ADMIN — MIDAZOLAM 10 MG/HR: 5 INJECTION, SOLUTION INTRAMUSCULAR; INTRAVENOUS at 12:44

## 2021-01-01 RX ADMIN — LEVETIRACETAM 500 MG: 100 INJECTION, SOLUTION INTRAVENOUS at 23:31

## 2021-01-01 RX ADMIN — HUMAN INSULIN 6 UNITS: 100 INJECTION, SOLUTION SUBCUTANEOUS at 22:44

## 2021-01-01 RX ADMIN — SODIUM ZIRCONIUM CYCLOSILICATE 5 G: 5 POWDER, FOR SUSPENSION ORAL at 08:01

## 2021-01-01 RX ADMIN — FUROSEMIDE 80 MG: 40 TABLET ORAL at 08:49

## 2021-01-01 RX ADMIN — LORAZEPAM 0.5 MG: 2 INJECTION INTRAMUSCULAR; INTRAVENOUS at 05:46

## 2021-01-01 RX ADMIN — DEXMEDETOMIDINE HYDROCHLORIDE 1.1 MCG/KG/HR: 100 INJECTION, SOLUTION INTRAVENOUS at 09:45

## 2021-01-01 RX ADMIN — Medication 175 MCG/HR: at 00:53

## 2021-01-01 RX ADMIN — DEXMEDETOMIDINE HYDROCHLORIDE 1 MCG/KG/HR: 100 INJECTION, SOLUTION, CONCENTRATE INTRAVENOUS at 03:01

## 2021-01-01 RX ADMIN — LORAZEPAM 2 MG: 2 INJECTION INTRAMUSCULAR; INTRAVENOUS at 08:19

## 2021-01-01 RX ADMIN — MORPHINE SULFATE 2 MG: 2 INJECTION, SOLUTION INTRAMUSCULAR; INTRAVENOUS at 19:12

## 2021-01-01 RX ADMIN — ROCURONIUM BROMIDE 80 MG: 10 INJECTION, SOLUTION INTRAVENOUS at 18:19

## 2021-01-01 RX ADMIN — HYDRALAZINE HYDROCHLORIDE 50 MG: 50 TABLET, FILM COATED ORAL at 06:24

## 2021-01-01 RX ADMIN — POTASSIUM BICARBONATE 20 MEQ: 782 TABLET, EFFERVESCENT ORAL at 06:00

## 2021-01-01 RX ADMIN — ALBUMIN (HUMAN) 25 G: 0.25 INJECTION, SOLUTION INTRAVENOUS at 05:33

## 2021-01-01 RX ADMIN — FENTANYL CITRATE 50 MCG: 0.05 INJECTION, SOLUTION INTRAMUSCULAR; INTRAVENOUS at 08:27

## 2021-01-01 RX ADMIN — ACETAMINOPHEN 650 MG: 325 TABLET, FILM COATED ORAL at 21:51

## 2021-01-01 RX ADMIN — FUROSEMIDE 40 MG: 10 INJECTION, SOLUTION INTRAMUSCULAR; INTRAVENOUS at 22:48

## 2021-01-01 RX ADMIN — PANTOPRAZOLE SODIUM 40 MG: 40 INJECTION, POWDER, FOR SOLUTION INTRAVENOUS at 09:22

## 2021-01-01 RX ADMIN — DEXMEDETOMIDINE HYDROCHLORIDE 1 MCG/KG/HR: 100 INJECTION, SOLUTION INTRAVENOUS at 18:47

## 2021-01-01 RX ADMIN — RISPERIDONE 1 MG: 1 SOLUTION ORAL at 00:28

## 2021-01-01 RX ADMIN — INSULIN GLARGINE 30 UNITS: 100 INJECTION, SOLUTION SUBCUTANEOUS at 21:54

## 2021-01-01 RX ADMIN — RISPERIDONE 1 MG: 1 TABLET ORAL at 21:14

## 2021-01-01 RX ADMIN — Medication 1 AMPULE: at 21:53

## 2021-01-01 RX ADMIN — HUMAN INSULIN 20 UNITS: 100 INJECTION, SOLUTION SUBCUTANEOUS at 07:00

## 2021-01-01 RX ADMIN — FUROSEMIDE 40 MG: 40 TABLET ORAL at 08:45

## 2021-01-01 RX ADMIN — OXYCODONE HYDROCHLORIDE AND ACETAMINOPHEN 500 MG: 500 TABLET ORAL at 08:54

## 2021-01-01 RX ADMIN — Medication 1 AMPULE: at 08:41

## 2021-01-01 RX ADMIN — MEROPENEM 500 MG: 500 INJECTION, POWDER, FOR SOLUTION INTRAVENOUS at 16:07

## 2021-01-01 RX ADMIN — GABAPENTIN 100 MG: 100 CAPSULE ORAL at 21:14

## 2021-01-01 RX ADMIN — IPRATROPIUM BROMIDE AND ALBUTEROL SULFATE 3 ML: .5; 3 SOLUTION RESPIRATORY (INHALATION) at 07:21

## 2021-01-01 RX ADMIN — DEXTROSE MONOHYDRATE 50 ML/HR: 5 INJECTION, SOLUTION INTRAVENOUS at 09:02

## 2021-01-01 RX ADMIN — VANCOMYCIN HYDROCHLORIDE 500 MG: 500 INJECTION, POWDER, LYOPHILIZED, FOR SOLUTION INTRAVENOUS at 23:52

## 2021-01-01 RX ADMIN — RISPERIDONE 0.5 MG: 1 SOLUTION ORAL at 23:00

## 2021-01-01 RX ADMIN — Medication 5 ML: at 05:51

## 2021-01-01 RX ADMIN — HYDROMORPHONE HYDROCHLORIDE 2 MG: 1 INJECTION, SOLUTION INTRAMUSCULAR; INTRAVENOUS; SUBCUTANEOUS at 07:40

## 2021-01-01 RX ADMIN — LOSARTAN POTASSIUM 100 MG: 50 TABLET, FILM COATED ORAL at 08:14

## 2021-01-01 RX ADMIN — ROPINIROLE HYDROCHLORIDE 0.5 MG: 0.5 TABLET, FILM COATED ORAL at 11:46

## 2021-01-01 RX ADMIN — FUROSEMIDE 40 MG: 40 TABLET ORAL at 09:54

## 2021-01-01 RX ADMIN — FERROUS SULFATE TAB 325 MG (65 MG ELEMENTAL FE) 325 MG: 325 (65 FE) TAB at 08:52

## 2021-01-01 RX ADMIN — IPRATROPIUM BROMIDE AND ALBUTEROL SULFATE 3 ML: .5; 3 SOLUTION RESPIRATORY (INHALATION) at 23:46

## 2021-01-01 RX ADMIN — TRAMADOL HYDROCHLORIDE 50 MG: 50 TABLET ORAL at 09:46

## 2021-01-01 RX ADMIN — ALBUTEROL SULFATE 2.5 MG: 2.5 SOLUTION RESPIRATORY (INHALATION) at 16:11

## 2021-01-01 RX ADMIN — LEVETIRACETAM 500 MG: 100 INJECTION, SOLUTION INTRAVENOUS at 21:25

## 2021-01-01 RX ADMIN — ALBUTEROL SULFATE 2.5 MG: 2.5 SOLUTION RESPIRATORY (INHALATION) at 21:06

## 2021-01-01 RX ADMIN — INSULIN GLARGINE 40 UNITS: 100 INJECTION, SOLUTION SUBCUTANEOUS at 22:19

## 2021-01-01 RX ADMIN — Medication 10 MG: at 13:03

## 2021-01-01 RX ADMIN — FUROSEMIDE 80 MG: 40 TABLET ORAL at 08:20

## 2021-01-01 RX ADMIN — SODIUM BICARBONATE: 84 INJECTION, SOLUTION INTRAVENOUS at 11:31

## 2021-01-01 RX ADMIN — RACEPINEPHRINE HYDROCHLORIDE 0.5 ML: 11.25 SOLUTION RESPIRATORY (INHALATION) at 19:08

## 2021-01-01 RX ADMIN — FAMOTIDINE 20 MG: 10 INJECTION INTRAVENOUS at 10:01

## 2021-01-01 RX ADMIN — FENTANYL CITRATE 50 MCG: 0.05 INJECTION, SOLUTION INTRAMUSCULAR; INTRAVENOUS at 08:31

## 2021-01-01 RX ADMIN — INSULIN GLARGINE 10 UNITS: 100 INJECTION, SOLUTION SUBCUTANEOUS at 08:26

## 2021-01-01 RX ADMIN — PROPOFOL 25 MCG/KG/MIN: 10 INJECTION, EMULSION INTRAVENOUS at 05:52

## 2021-01-01 RX ADMIN — ALBUTEROL SULFATE 2.5 MG: 2.5 SOLUTION RESPIRATORY (INHALATION) at 02:41

## 2021-01-01 RX ADMIN — HUMAN INSULIN 3 UNITS: 100 INJECTION, SOLUTION SUBCUTANEOUS at 23:31

## 2021-01-01 RX ADMIN — Medication 10 MG: at 11:25

## 2021-01-01 RX ADMIN — Medication 5 ML: at 15:00

## 2021-01-01 RX ADMIN — PROPOFOL 20 MCG/KG/MIN: 10 INJECTION, EMULSION INTRAVENOUS at 18:08

## 2021-01-01 RX ADMIN — LEVETIRACETAM 500 MG: 100 INJECTION, SOLUTION INTRAVENOUS at 21:01

## 2021-01-01 RX ADMIN — ACETAZOLAMIDE 500 MG: 250 TABLET ORAL at 18:30

## 2021-01-01 RX ADMIN — HEPARIN SODIUM 5000 UNITS: 5000 INJECTION INTRAVENOUS; SUBCUTANEOUS at 09:42

## 2021-01-01 RX ADMIN — LEVETIRACETAM 500 MG: 100 INJECTION, SOLUTION INTRAVENOUS at 23:26

## 2021-01-01 RX ADMIN — INSULIN LISPRO 6 UNITS: 100 INJECTION, SOLUTION INTRAVENOUS; SUBCUTANEOUS at 21:57

## 2021-01-01 RX ADMIN — INSULIN GLARGINE 20 UNITS: 100 INJECTION, SOLUTION SUBCUTANEOUS at 08:40

## 2021-01-01 RX ADMIN — METOPROLOL TARTRATE 12.5 MG: 25 TABLET, FILM COATED ORAL at 17:33

## 2021-01-01 RX ADMIN — HUMAN INSULIN 9 UNITS: 100 INJECTION, SOLUTION SUBCUTANEOUS at 18:30

## 2021-01-01 RX ADMIN — HUMAN INSULIN 6 UNITS: 100 INJECTION, SOLUTION SUBCUTANEOUS at 08:27

## 2021-01-01 RX ADMIN — ALBUTEROL SULFATE 2.5 MG: 2.5 SOLUTION RESPIRATORY (INHALATION) at 08:40

## 2021-01-01 RX ADMIN — ALBUTEROL SULFATE 2.5 MG: 2.5 SOLUTION RESPIRATORY (INHALATION) at 03:01

## 2021-01-01 RX ADMIN — MORPHINE SULFATE 2 MG: 2 INJECTION, SOLUTION INTRAMUSCULAR; INTRAVENOUS at 13:14

## 2021-01-01 RX ADMIN — BUDESONIDE 500 MCG: 0.5 INHALANT RESPIRATORY (INHALATION) at 20:54

## 2021-01-01 RX ADMIN — RISPERIDONE 0.5 MG: 1 TABLET, FILM COATED ORAL at 08:49

## 2021-01-01 RX ADMIN — FUROSEMIDE 80 MG: 10 INJECTION, SOLUTION INTRAMUSCULAR; INTRAVENOUS at 11:47

## 2021-01-01 RX ADMIN — LOPERAMIDE HCL 2 MG: 1 SOLUTION ORAL at 23:08

## 2021-01-01 RX ADMIN — NOREPINEPHRINE-DEXTROSE IV SOLUTION 4 MG/250ML-5% 8 MCG/MIN: 4-5/250 SOLUTION at 20:42

## 2021-01-01 RX ADMIN — Medication 200 MCG/HR: at 05:30

## 2021-01-01 RX ADMIN — ALUMINUM HYDROXIDE, MAGNESIUM HYDROXIDE, DIMETHICONE 5 ML: 200; 200; 20 LIQUID ORAL at 17:35

## 2021-01-01 RX ADMIN — INSULIN LISPRO 6 UNITS: 100 INJECTION, SOLUTION INTRAVENOUS; SUBCUTANEOUS at 12:19

## 2021-01-01 RX ADMIN — Medication 10 ML: at 21:05

## 2021-01-01 RX ADMIN — INSULIN HUMAN 15 UNITS: 100 INJECTION, SOLUTION PARENTERAL at 05:52

## 2021-01-01 RX ADMIN — ALBUTEROL SULFATE 2.5 MG: 2.5 SOLUTION RESPIRATORY (INHALATION) at 16:04

## 2021-01-01 RX ADMIN — ALBUTEROL SULFATE 2.5 MG: 2.5 SOLUTION RESPIRATORY (INHALATION) at 21:37

## 2021-01-01 RX ADMIN — ALBUTEROL SULFATE 2.5 MG: 2.5 SOLUTION RESPIRATORY (INHALATION) at 08:24

## 2021-01-01 RX ADMIN — HEPARIN SODIUM 5000 UNITS: 5000 INJECTION INTRAVENOUS; SUBCUTANEOUS at 08:18

## 2021-01-01 RX ADMIN — FENTANYL CITRATE 50 MCG/HR: 50 INJECTION INTRAVENOUS at 10:50

## 2021-01-01 RX ADMIN — FENTANYL CITRATE 125 MCG/HR: 50 INJECTION INTRAVENOUS at 05:39

## 2021-01-01 RX ADMIN — DEXMEDETOMIDINE HYDROCHLORIDE 1.2 MCG/KG/HR: 100 INJECTION, SOLUTION INTRAVENOUS at 08:32

## 2021-01-01 RX ADMIN — ALBUMIN (HUMAN) 25 G: 0.25 INJECTION, SOLUTION INTRAVENOUS at 11:40

## 2021-01-01 RX ADMIN — HUMAN INSULIN 10 UNITS: 100 INJECTION, SOLUTION SUBCUTANEOUS at 06:00

## 2021-01-01 RX ADMIN — FAMOTIDINE 20 MG: 20 TABLET, FILM COATED ORAL at 08:19

## 2021-01-01 RX ADMIN — METOPROLOL SUCCINATE 12.5 MG: 25 TABLET, EXTENDED RELEASE ORAL at 08:08

## 2021-01-01 RX ADMIN — HUMAN INSULIN 3 UNITS: 100 INJECTION, SOLUTION SUBCUTANEOUS at 21:07

## 2021-01-01 RX ADMIN — HYDRALAZINE HYDROCHLORIDE 50 MG: 50 TABLET, FILM COATED ORAL at 11:11

## 2021-01-01 RX ADMIN — FUROSEMIDE 40 MG: 40 TABLET ORAL at 08:47

## 2021-01-01 RX ADMIN — DEXMEDETOMIDINE HYDROCHLORIDE 1.5 MCG/KG/HR: 100 INJECTION, SOLUTION INTRAVENOUS at 14:19

## 2021-01-01 RX ADMIN — RISPERIDONE 1 MG: 1 SOLUTION ORAL at 20:49

## 2021-01-01 RX ADMIN — SODIUM CHLORIDE SOLN NEBU 3% 4 ML: 3 NEBU SOLN at 07:53

## 2021-01-01 RX ADMIN — NOREPINEPHRINE-DEXTROSE IV SOLUTION 4 MG/250ML-5% 10 MCG/MIN: 4-5/250 SOLUTION at 12:02

## 2021-01-01 RX ADMIN — Medication 5 ML: at 21:14

## 2021-01-01 RX ADMIN — LEVETIRACETAM 1500 MG: 100 INJECTION, SOLUTION INTRAVENOUS at 11:58

## 2021-01-01 RX ADMIN — FAMOTIDINE 20 MG: 20 TABLET, FILM COATED ORAL at 08:48

## 2021-01-01 RX ADMIN — HYDROMORPHONE HYDROCHLORIDE 2 MG: 1 INJECTION, SOLUTION INTRAMUSCULAR; INTRAVENOUS; SUBCUTANEOUS at 07:35

## 2021-01-01 RX ADMIN — HEPARIN SODIUM 5000 UNITS: 1000 INJECTION INTRAVENOUS; SUBCUTANEOUS at 15:34

## 2021-01-01 RX ADMIN — LORAZEPAM 2 MG: 2 INJECTION INTRAMUSCULAR; INTRAVENOUS at 08:34

## 2021-01-01 RX ADMIN — HUMAN INSULIN 4 UNITS: 100 INJECTION, SOLUTION SUBCUTANEOUS at 12:13

## 2021-01-01 RX ADMIN — HUMAN INSULIN 10 UNITS: 100 INJECTION, SOLUTION SUBCUTANEOUS at 00:00

## 2021-01-01 RX ADMIN — FUROSEMIDE 10 MG/HR: 10 INJECTION, SOLUTION INTRAMUSCULAR; INTRAVENOUS at 16:29

## 2021-01-01 RX ADMIN — DEXMEDETOMIDINE HYDROCHLORIDE 1.3 MCG/KG/HR: 100 INJECTION, SOLUTION, CONCENTRATE INTRAVENOUS at 07:13

## 2021-01-01 RX ADMIN — METOPROLOL TARTRATE 25 MG: 25 TABLET, FILM COATED ORAL at 09:54

## 2021-01-01 RX ADMIN — INSULIN LISPRO 6 UNITS: 100 INJECTION, SOLUTION INTRAVENOUS; SUBCUTANEOUS at 11:51

## 2021-01-01 RX ADMIN — Medication 5 ML: at 20:00

## 2021-01-01 RX ADMIN — BARIUM SULFATE 30 ML: 980 POWDER, FOR SUSPENSION ORAL at 11:20

## 2021-01-01 RX ADMIN — HYDRALAZINE HYDROCHLORIDE 10 MG: 10 TABLET, FILM COATED ORAL at 21:26

## 2021-01-01 RX ADMIN — ONDANSETRON 4 MG: 2 INJECTION INTRAMUSCULAR; INTRAVENOUS at 13:12

## 2021-01-01 RX ADMIN — DEXMEDETOMIDINE HYDROCHLORIDE 1.5 MCG/KG/HR: 100 INJECTION, SOLUTION INTRAVENOUS at 01:22

## 2021-01-01 RX ADMIN — HUMAN INSULIN 3 UNITS: 100 INJECTION, SOLUTION SUBCUTANEOUS at 00:12

## 2021-01-01 RX ADMIN — DEXMEDETOMIDINE HYDROCHLORIDE 1.5 MCG/KG/HR: 100 INJECTION, SOLUTION INTRAVENOUS at 00:37

## 2021-01-01 RX ADMIN — HEPARIN SODIUM 5000 UNITS: 5000 INJECTION INTRAVENOUS; SUBCUTANEOUS at 16:18

## 2021-01-01 RX ADMIN — GABAPENTIN 100 MG: 100 CAPSULE ORAL at 09:24

## 2021-01-01 RX ADMIN — METHYLPREDNISOLONE SODIUM SUCCINATE 40 MG: 40 INJECTION, POWDER, FOR SOLUTION INTRAMUSCULAR; INTRAVENOUS at 11:25

## 2021-01-01 RX ADMIN — FAMOTIDINE 20 MG: 10 INJECTION INTRAVENOUS at 08:31

## 2021-01-01 RX ADMIN — FENTANYL CITRATE 50 MCG: 0.05 INJECTION, SOLUTION INTRAMUSCULAR; INTRAVENOUS at 02:14

## 2021-01-01 RX ADMIN — DEXMEDETOMIDINE HYDROCHLORIDE 1.5 MCG/KG/HR: 100 INJECTION, SOLUTION INTRAVENOUS at 20:05

## 2021-01-01 RX ADMIN — Medication 10 ML: at 05:00

## 2021-01-01 RX ADMIN — Medication 1 AMPULE: at 08:18

## 2021-01-01 RX ADMIN — DOXYCYCLINE 100 MG: 100 INJECTION, POWDER, LYOPHILIZED, FOR SOLUTION INTRAVENOUS at 08:15

## 2021-01-01 RX ADMIN — Medication 1 AMPULE: at 21:11

## 2021-01-01 RX ADMIN — ALBUTEROL SULFATE 2.5 MG: 2.5 SOLUTION RESPIRATORY (INHALATION) at 09:29

## 2021-01-01 RX ADMIN — SODIUM PHOSPHATE, MONOBASIC, MONOHYDRATE: 276; 142 INJECTION, SOLUTION INTRAVENOUS at 06:13

## 2021-01-01 RX ADMIN — INSULIN GLARGINE 14 UNITS: 100 INJECTION, SOLUTION SUBCUTANEOUS at 08:40

## 2021-01-01 RX ADMIN — Medication 1 AMPULE: at 20:35

## 2021-01-01 RX ADMIN — ALBUTEROL SULFATE 2.5 MG: 2.5 SOLUTION RESPIRATORY (INHALATION) at 02:10

## 2021-01-01 RX ADMIN — Medication 1 AMPULE: at 21:45

## 2021-01-01 RX ADMIN — HYDRALAZINE HYDROCHLORIDE 10 MG: 20 INJECTION, SOLUTION INTRAMUSCULAR; INTRAVENOUS at 03:26

## 2021-01-01 RX ADMIN — ALBUTEROL SULFATE 2.5 MG: 2.5 SOLUTION RESPIRATORY (INHALATION) at 09:25

## 2021-01-01 RX ADMIN — RISPERIDONE 1 MG: 1 SOLUTION ORAL at 20:24

## 2021-01-01 RX ADMIN — Medication 10 ML: at 13:25

## 2021-01-01 RX ADMIN — HUMAN INSULIN 3 UNITS: 100 INJECTION, SOLUTION SUBCUTANEOUS at 23:36

## 2021-01-01 RX ADMIN — INSULIN LISPRO 4 UNITS: 100 INJECTION, SOLUTION INTRAVENOUS; SUBCUTANEOUS at 09:33

## 2021-01-01 RX ADMIN — INSULIN GLARGINE 20 UNITS: 100 INJECTION, SOLUTION SUBCUTANEOUS at 09:36

## 2021-01-01 RX ADMIN — PROPOFOL 20 MCG/KG/MIN: 10 INJECTION, EMULSION INTRAVENOUS at 14:08

## 2021-01-01 RX ADMIN — Medication 200 MCG/HR: at 10:02

## 2021-01-01 RX ADMIN — CLOPIDOGREL BISULFATE 75 MG: 75 TABLET ORAL at 08:37

## 2021-01-01 RX ADMIN — HUMAN INSULIN 9 UNITS: 100 INJECTION, SOLUTION SUBCUTANEOUS at 08:17

## 2021-01-01 RX ADMIN — DEXMEDETOMIDINE HYDROCHLORIDE 1.3 MCG/KG/HR: 100 INJECTION, SOLUTION INTRAVENOUS at 11:00

## 2021-01-01 RX ADMIN — ALBUTEROL SULFATE 2.5 MG: 2.5 SOLUTION RESPIRATORY (INHALATION) at 17:27

## 2021-01-01 RX ADMIN — ALBUTEROL SULFATE 2.5 MG: 2.5 SOLUTION RESPIRATORY (INHALATION) at 21:03

## 2021-01-01 RX ADMIN — INSULIN LISPRO 6 UNITS: 100 INJECTION, SOLUTION INTRAVENOUS; SUBCUTANEOUS at 12:06

## 2021-01-01 RX ADMIN — HYDRALAZINE HYDROCHLORIDE 10 MG: 20 INJECTION, SOLUTION INTRAMUSCULAR; INTRAVENOUS at 00:33

## 2021-01-01 RX ADMIN — CLOPIDOGREL BISULFATE 75 MG: 75 TABLET ORAL at 08:49

## 2021-01-01 RX ADMIN — FAMOTIDINE 20 MG: 10 INJECTION INTRAVENOUS at 09:37

## 2021-01-01 RX ADMIN — HUMAN INSULIN 3 UNITS: 100 INJECTION, SOLUTION SUBCUTANEOUS at 23:44

## 2021-01-01 RX ADMIN — ASPIRIN 81 MG: 81 TABLET ORAL at 07:59

## 2021-01-01 RX ADMIN — INSULIN LISPRO 6 UNITS: 100 INJECTION, SOLUTION INTRAVENOUS; SUBCUTANEOUS at 12:28

## 2021-01-01 RX ADMIN — ALBUTEROL SULFATE 2.5 MG: 2.5 SOLUTION RESPIRATORY (INHALATION) at 13:34

## 2021-01-01 RX ADMIN — ALBUTEROL SULFATE 2.5 MG: 2.5 SOLUTION RESPIRATORY (INHALATION) at 03:42

## 2021-01-01 RX ADMIN — DEXAMETHASONE SODIUM PHOSPHATE 4 MG: 4 INJECTION, SOLUTION INTRAMUSCULAR; INTRAVENOUS at 11:42

## 2021-01-01 RX ADMIN — IPRATROPIUM BROMIDE AND ALBUTEROL SULFATE 3 ML: .5; 3 SOLUTION RESPIRATORY (INHALATION) at 16:06

## 2021-01-01 RX ADMIN — Medication 1 AMPULE: at 21:13

## 2021-01-01 RX ADMIN — ALBUTEROL SULFATE 2.5 MG: 2.5 SOLUTION RESPIRATORY (INHALATION) at 19:38

## 2021-01-01 RX ADMIN — HUMAN INSULIN 9 UNITS: 100 INJECTION, SOLUTION SUBCUTANEOUS at 08:52

## 2021-01-01 RX ADMIN — INSULIN LISPRO 4 UNITS: 100 INJECTION, SOLUTION INTRAVENOUS; SUBCUTANEOUS at 12:27

## 2021-01-01 RX ADMIN — LINEZOLID 600 MG: 600 INJECTION, SOLUTION INTRAVENOUS at 22:27

## 2021-01-01 RX ADMIN — Medication 1 AMPULE: at 08:51

## 2021-01-01 RX ADMIN — FAMOTIDINE 20 MG: 10 INJECTION INTRAVENOUS at 08:58

## 2021-01-01 RX ADMIN — GABAPENTIN 100 MG: 100 CAPSULE ORAL at 20:35

## 2021-01-01 RX ADMIN — ALBUTEROL SULFATE 2.5 MG: 2.5 SOLUTION RESPIRATORY (INHALATION) at 04:54

## 2021-01-01 RX ADMIN — HUMAN INSULIN 6 UNITS: 100 INJECTION, SOLUTION SUBCUTANEOUS at 04:42

## 2021-01-01 RX ADMIN — OXYCODONE HYDROCHLORIDE 5 MG: 5 TABLET ORAL at 03:16

## 2021-01-01 RX ADMIN — METHADONE HYDROCHLORIDE 10 MG: 5 TABLET ORAL at 18:40

## 2021-01-01 RX ADMIN — FAMOTIDINE 20 MG: 10 INJECTION INTRAVENOUS at 08:24

## 2021-01-01 RX ADMIN — RISPERIDONE 1 MG: 1 SOLUTION ORAL at 08:00

## 2021-01-01 RX ADMIN — Medication 10 ML: at 21:54

## 2021-01-01 RX ADMIN — DEXMEDETOMIDINE HYDROCHLORIDE 1.4 MCG/KG/HR: 100 INJECTION, SOLUTION INTRAVENOUS at 13:01

## 2021-01-01 RX ADMIN — METHYLPREDNISOLONE SODIUM SUCCINATE 40 MG: 40 INJECTION, POWDER, FOR SOLUTION INTRAMUSCULAR; INTRAVENOUS at 06:03

## 2021-01-01 RX ADMIN — SODIUM ZIRCONIUM CYCLOSILICATE 10 G: 10 POWDER, FOR SUSPENSION ORAL at 22:05

## 2021-01-01 RX ADMIN — HEPARIN SODIUM 5000 UNITS: 5000 INJECTION INTRAVENOUS; SUBCUTANEOUS at 14:09

## 2021-01-01 RX ADMIN — FENTANYL CITRATE 50 MCG: 50 INJECTION INTRAMUSCULAR; INTRAVENOUS at 12:54

## 2021-01-01 RX ADMIN — SODIUM CHLORIDE 10 ML: 9 INJECTION, SOLUTION INTRAMUSCULAR; INTRAVENOUS; SUBCUTANEOUS at 14:38

## 2021-01-01 RX ADMIN — Medication 100 MCG/HR: at 18:55

## 2021-01-01 RX ADMIN — DEXMEDETOMIDINE HYDROCHLORIDE 1.5 MCG/KG/HR: 100 INJECTION, SOLUTION INTRAVENOUS at 01:45

## 2021-01-01 RX ADMIN — Medication 1 AMPULE: at 20:09

## 2021-01-01 RX ADMIN — FAMOTIDINE 20 MG: 10 INJECTION INTRAVENOUS at 08:00

## 2021-01-01 RX ADMIN — ALBUTEROL SULFATE 2.5 MG: 2.5 SOLUTION RESPIRATORY (INHALATION) at 08:10

## 2021-01-01 RX ADMIN — HYDRALAZINE HYDROCHLORIDE 10 MG: 10 TABLET, FILM COATED ORAL at 00:28

## 2021-01-01 RX ADMIN — SODIUM CHLORIDE 10 ML: 9 INJECTION, SOLUTION INTRAMUSCULAR; INTRAVENOUS; SUBCUTANEOUS at 16:30

## 2021-01-01 RX ADMIN — FUROSEMIDE 40 MG: 10 INJECTION, SOLUTION INTRAMUSCULAR; INTRAVENOUS at 09:15

## 2021-01-01 RX ADMIN — SODIUM CHLORIDE 80 MG: 9 INJECTION, SOLUTION INTRAMUSCULAR; INTRAVENOUS; SUBCUTANEOUS at 08:00

## 2021-01-01 RX ADMIN — INSULIN GLARGINE 12 UNITS: 100 INJECTION, SOLUTION SUBCUTANEOUS at 08:12

## 2021-01-01 RX ADMIN — HUMAN INSULIN 6 UNITS: 100 INJECTION, SOLUTION SUBCUTANEOUS at 12:00

## 2021-01-01 RX ADMIN — FENTANYL CITRATE 50 MCG: 50 INJECTION INTRAMUSCULAR; INTRAVENOUS at 02:29

## 2021-01-01 RX ADMIN — ALBUTEROL SULFATE 2.5 MG: 2.5 SOLUTION RESPIRATORY (INHALATION) at 14:28

## 2021-01-01 RX ADMIN — LORAZEPAM 1 MG: 2 INJECTION INTRAMUSCULAR; INTRAVENOUS at 11:17

## 2021-01-01 RX ADMIN — SODIUM CHLORIDE 10 ML: 9 INJECTION, SOLUTION INTRAMUSCULAR; INTRAVENOUS; SUBCUTANEOUS at 21:28

## 2021-01-01 RX ADMIN — Medication 1 AMPULE: at 08:00

## 2021-01-01 RX ADMIN — Medication 1 AMPULE: at 10:00

## 2021-01-01 RX ADMIN — GUANFACINE 1 MG: 1 TABLET ORAL at 20:38

## 2021-01-01 RX ADMIN — Medication 1 AMPULE: at 10:16

## 2021-01-01 RX ADMIN — ALBUTEROL SULFATE 2.5 MG: 2.5 SOLUTION RESPIRATORY (INHALATION) at 03:03

## 2021-01-01 RX ADMIN — HEPARIN SODIUM 5000 UNITS: 5000 INJECTION INTRAVENOUS; SUBCUTANEOUS at 16:12

## 2021-01-01 RX ADMIN — FUROSEMIDE 40 MG: 10 INJECTION, SOLUTION INTRAMUSCULAR; INTRAVENOUS at 10:09

## 2021-01-01 RX ADMIN — ALBUTEROL SULFATE 2.5 MG: 2.5 SOLUTION RESPIRATORY (INHALATION) at 07:22

## 2021-01-01 RX ADMIN — GABAPENTIN 100 MG: 100 CAPSULE ORAL at 14:29

## 2021-01-01 RX ADMIN — Medication 1 AMPULE: at 20:05

## 2021-01-01 RX ADMIN — HEPARIN SODIUM 5000 UNITS: 5000 INJECTION INTRAVENOUS; SUBCUTANEOUS at 09:29

## 2021-01-01 RX ADMIN — FUROSEMIDE 40 MG: 10 INJECTION, SOLUTION INTRAMUSCULAR; INTRAVENOUS at 20:02

## 2021-01-01 RX ADMIN — FERROUS SULFATE TAB 325 MG (65 MG ELEMENTAL FE) 325 MG: 325 (65 FE) TAB at 09:04

## 2021-01-01 RX ADMIN — HYDRALAZINE HYDROCHLORIDE 50 MG: 50 TABLET, FILM COATED ORAL at 00:00

## 2021-01-01 RX ADMIN — HYDROMORPHONE HYDROCHLORIDE 2 MG: 1 INJECTION, SOLUTION INTRAMUSCULAR; INTRAVENOUS; SUBCUTANEOUS at 17:57

## 2021-01-01 RX ADMIN — Medication 200 MCG/HR: at 16:46

## 2021-01-01 RX ADMIN — CEFAZOLIN SODIUM 2 G: 100 INJECTION, POWDER, LYOPHILIZED, FOR SOLUTION INTRAVENOUS at 08:38

## 2021-01-01 RX ADMIN — SODIUM CHLORIDE 40 ML: 9 INJECTION, SOLUTION INTRAMUSCULAR; INTRAVENOUS; SUBCUTANEOUS at 05:55

## 2021-01-01 RX ADMIN — HUMAN INSULIN 6 UNITS: 100 INJECTION, SOLUTION SUBCUTANEOUS at 08:43

## 2021-01-01 RX ADMIN — FUROSEMIDE 40 MG: 10 INJECTION, SOLUTION INTRAMUSCULAR; INTRAVENOUS at 13:02

## 2021-01-01 RX ADMIN — ALUMINUM HYDROXIDE, MAGNESIUM HYDROXIDE, DIMETHICONE 5 ML: 200; 200; 20 LIQUID ORAL at 23:26

## 2021-01-01 RX ADMIN — SODIUM CHLORIDE 10 ML: 9 INJECTION, SOLUTION INTRAMUSCULAR; INTRAVENOUS; SUBCUTANEOUS at 11:01

## 2021-01-01 RX ADMIN — Medication 1000 MG: at 17:30

## 2021-01-01 RX ADMIN — PROPOFOL 130 MG: 10 INJECTION, EMULSION INTRAVENOUS at 12:52

## 2021-01-01 RX ADMIN — RDII 250 MG CAPSULE 250 MG: at 17:13

## 2021-01-01 RX ADMIN — METOPROLOL SUCCINATE 12.5 MG: 25 TABLET, EXTENDED RELEASE ORAL at 08:38

## 2021-01-01 RX ADMIN — HUMAN INSULIN 3 UNITS: 100 INJECTION, SOLUTION SUBCUTANEOUS at 05:52

## 2021-01-01 RX ADMIN — GABAPENTIN 100 MG: 100 CAPSULE ORAL at 22:39

## 2021-01-01 RX ADMIN — METHYLPREDNISOLONE SODIUM SUCCINATE 40 MG: 40 INJECTION, POWDER, FOR SOLUTION INTRAMUSCULAR; INTRAVENOUS at 08:24

## 2021-01-01 RX ADMIN — FENTANYL CITRATE 50 MCG: 0.05 INJECTION, SOLUTION INTRAMUSCULAR; INTRAVENOUS at 16:49

## 2021-01-01 RX ADMIN — SODIUM CHLORIDE 250 ML: 900 INJECTION, SOLUTION INTRAVENOUS at 13:14

## 2021-01-01 RX ADMIN — SODIUM ZIRCONIUM CYCLOSILICATE 10 G: 10 POWDER, FOR SUSPENSION ORAL at 08:08

## 2021-01-01 RX ADMIN — HEPARIN SODIUM 5000 UNITS: 5000 INJECTION INTRAVENOUS; SUBCUTANEOUS at 08:38

## 2021-01-01 RX ADMIN — ENOXAPARIN SODIUM 30 MG: 30 INJECTION SUBCUTANEOUS at 08:29

## 2021-01-01 RX ADMIN — LINEZOLID 600 MG: 600 INJECTION, SOLUTION INTRAVENOUS at 09:12

## 2021-01-01 RX ADMIN — SODIUM CHLORIDE 10 ML: 9 INJECTION, SOLUTION INTRAMUSCULAR; INTRAVENOUS; SUBCUTANEOUS at 21:46

## 2021-01-01 RX ADMIN — FUROSEMIDE 40 MG: 40 TABLET ORAL at 08:14

## 2021-01-01 RX ADMIN — HUMAN INSULIN 3 UNITS: 100 INJECTION, SOLUTION SUBCUTANEOUS at 08:47

## 2021-01-01 RX ADMIN — IPRATROPIUM BROMIDE AND ALBUTEROL SULFATE 3 ML: .5; 3 SOLUTION RESPIRATORY (INHALATION) at 11:21

## 2021-01-01 RX ADMIN — GADOTERIDOL 12 ML: 279.3 INJECTION, SOLUTION INTRAVENOUS at 14:56

## 2021-01-01 RX ADMIN — HUMAN INSULIN 3 UNITS: 100 INJECTION, SOLUTION SUBCUTANEOUS at 06:29

## 2021-01-01 RX ADMIN — HUMAN INSULIN 6 UNITS: 100 INJECTION, SOLUTION SUBCUTANEOUS at 11:43

## 2021-01-01 RX ADMIN — MODAFINIL 100 MG: 100 TABLET ORAL at 08:27

## 2021-01-01 RX ADMIN — SODIUM CHLORIDE 40 ML: 9 INJECTION, SOLUTION INTRAMUSCULAR; INTRAVENOUS; SUBCUTANEOUS at 05:15

## 2021-01-01 RX ADMIN — ALBUTEROL SULFATE 2.5 MG: 2.5 SOLUTION RESPIRATORY (INHALATION) at 07:53

## 2021-01-01 RX ADMIN — DEXMEDETOMIDINE HYDROCHLORIDE 0.8 MCG/KG/HR: 100 INJECTION, SOLUTION INTRAVENOUS at 08:06

## 2021-01-01 RX ADMIN — GABAPENTIN 100 MG: 100 CAPSULE ORAL at 08:49

## 2021-01-01 RX ADMIN — HYDRALAZINE HYDROCHLORIDE 10 MG: 10 TABLET, FILM COATED ORAL at 08:22

## 2021-01-01 RX ADMIN — DEXMEDETOMIDINE HYDROCHLORIDE 1.2 MCG/KG/HR: 100 INJECTION, SOLUTION INTRAVENOUS at 05:15

## 2021-01-01 RX ADMIN — HUMAN INSULIN 9 UNITS: 100 INJECTION, SOLUTION SUBCUTANEOUS at 20:22

## 2021-01-01 RX ADMIN — DEXMEDETOMIDINE HYDROCHLORIDE 0.9 MCG/KG/HR: 100 INJECTION, SOLUTION INTRAVENOUS at 21:59

## 2021-01-01 RX ADMIN — ALBUTEROL SULFATE 2.5 MG: 2.5 SOLUTION RESPIRATORY (INHALATION) at 02:38

## 2021-01-01 RX ADMIN — PIPERACILLIN SODIUM AND TAZOBACTAM SODIUM 4.5 G: 4; .5 INJECTION, POWDER, LYOPHILIZED, FOR SOLUTION INTRAVENOUS at 23:32

## 2021-01-01 RX ADMIN — HUMAN INSULIN 6 UNITS: 100 INJECTION, SOLUTION SUBCUTANEOUS at 12:01

## 2021-01-01 RX ADMIN — SODIUM CHLORIDE, SODIUM LACTATE, POTASSIUM CHLORIDE, AND CALCIUM CHLORIDE 75 ML/HR: 600; 310; 30; 20 INJECTION, SOLUTION INTRAVENOUS at 10:33

## 2021-01-01 RX ADMIN — SODIUM CHLORIDE 10 ML: 9 INJECTION, SOLUTION INTRAMUSCULAR; INTRAVENOUS; SUBCUTANEOUS at 15:30

## 2021-01-01 RX ADMIN — IPRATROPIUM BROMIDE AND ALBUTEROL SULFATE 3 ML: .5; 3 SOLUTION RESPIRATORY (INHALATION) at 07:09

## 2021-01-01 RX ADMIN — IPRATROPIUM BROMIDE AND ALBUTEROL SULFATE 3 ML: .5; 3 SOLUTION RESPIRATORY (INHALATION) at 19:52

## 2021-01-01 RX ADMIN — INSULIN GLARGINE 20 UNITS: 100 INJECTION, SOLUTION SUBCUTANEOUS at 08:46

## 2021-01-01 RX ADMIN — HEPARIN SODIUM 5000 UNITS: 5000 INJECTION INTRAVENOUS; SUBCUTANEOUS at 08:48

## 2021-01-01 RX ADMIN — PIPERACILLIN SODIUM AND TAZOBACTAM SODIUM 3.38 G: 3; .375 INJECTION, POWDER, LYOPHILIZED, FOR SOLUTION INTRAVENOUS at 04:00

## 2021-01-01 RX ADMIN — Medication 10 ML: at 12:19

## 2021-01-01 RX ADMIN — PANTOPRAZOLE SODIUM 40 MG: 40 TABLET, DELAYED RELEASE ORAL at 08:40

## 2021-01-01 RX ADMIN — INSULIN HUMAN 4 UNITS: 100 INJECTION, SOLUTION PARENTERAL at 00:24

## 2021-01-01 RX ADMIN — CLOPIDOGREL BISULFATE 75 MG: 75 TABLET ORAL at 09:24

## 2021-01-01 RX ADMIN — OXYCODONE HYDROCHLORIDE 15 MG: 5 TABLET ORAL at 02:48

## 2021-01-01 RX ADMIN — Medication 1 AMPULE: at 08:01

## 2021-01-01 RX ADMIN — ALBUTEROL SULFATE 2.5 MG: 2.5 SOLUTION RESPIRATORY (INHALATION) at 08:32

## 2021-01-01 RX ADMIN — CEFEPIME HYDROCHLORIDE 2 G: 2 INJECTION, POWDER, FOR SOLUTION INTRAVENOUS at 22:28

## 2021-01-01 RX ADMIN — HUMAN INSULIN 6 UNITS: 100 INJECTION, SOLUTION SUBCUTANEOUS at 00:00

## 2021-01-01 RX ADMIN — FUROSEMIDE 40 MG: 10 INJECTION, SOLUTION INTRAMUSCULAR; INTRAVENOUS at 08:12

## 2021-01-01 RX ADMIN — Medication 1 AMPULE: at 08:14

## 2021-01-01 RX ADMIN — Medication 10 ML: at 17:03

## 2021-01-01 RX ADMIN — LORAZEPAM 1 MG: 2 INJECTION INTRAMUSCULAR; INTRAVENOUS at 03:10

## 2021-01-01 RX ADMIN — DEXMEDETOMIDINE HYDROCHLORIDE 1.2 MCG/KG/HR: 100 INJECTION, SOLUTION, CONCENTRATE INTRAVENOUS at 22:48

## 2021-01-01 RX ADMIN — FAMOTIDINE 20 MG: 10 INJECTION INTRAVENOUS at 09:00

## 2021-01-01 RX ADMIN — FERROUS SULFATE TAB 325 MG (65 MG ELEMENTAL FE) 325 MG: 325 (65 FE) TAB at 21:16

## 2021-01-01 RX ADMIN — SODIUM CHLORIDE 10 ML: 9 INJECTION, SOLUTION INTRAMUSCULAR; INTRAVENOUS; SUBCUTANEOUS at 21:19

## 2021-01-01 RX ADMIN — INSULIN HUMAN 4 UNITS: 100 INJECTION, SOLUTION PARENTERAL at 05:59

## 2021-01-01 RX ADMIN — DEXMEDETOMIDINE HYDROCHLORIDE 1.3 MCG/KG/HR: 100 INJECTION, SOLUTION INTRAVENOUS at 02:29

## 2021-01-01 RX ADMIN — ALBUTEROL SULFATE 2.5 MG: 2.5 SOLUTION RESPIRATORY (INHALATION) at 03:50

## 2021-01-01 RX ADMIN — INSULIN GLARGINE 12 UNITS: 100 INJECTION, SOLUTION SUBCUTANEOUS at 08:37

## 2021-01-01 RX ADMIN — DEXMEDETOMIDINE HYDROCHLORIDE 1.5 MCG/KG/HR: 100 INJECTION, SOLUTION INTRAVENOUS at 04:52

## 2021-01-01 RX ADMIN — RDII 250 MG CAPSULE 250 MG: at 17:47

## 2021-01-01 RX ADMIN — HYDROMORPHONE HYDROCHLORIDE 2 MG: 1 INJECTION, SOLUTION INTRAMUSCULAR; INTRAVENOUS; SUBCUTANEOUS at 03:11

## 2021-01-01 RX ADMIN — HUMAN INSULIN 6 UNITS: 100 INJECTION, SOLUTION SUBCUTANEOUS at 16:29

## 2021-01-01 RX ADMIN — HYDRALAZINE HYDROCHLORIDE 10 MG: 10 TABLET, FILM COATED ORAL at 09:48

## 2021-01-01 RX ADMIN — ALBUTEROL SULFATE 2.5 MG: 2.5 SOLUTION RESPIRATORY (INHALATION) at 13:45

## 2021-01-01 RX ADMIN — RISPERIDONE 0.5 MG: 1 SOLUTION ORAL at 09:05

## 2021-01-01 RX ADMIN — HUMAN INSULIN 3 UNITS: 100 INJECTION, SOLUTION SUBCUTANEOUS at 11:40

## 2021-01-01 RX ADMIN — METOPROLOL TARTRATE 12.5 MG: 25 TABLET, FILM COATED ORAL at 08:18

## 2021-01-01 RX ADMIN — Medication 150 MCG/HR: at 11:32

## 2021-01-01 RX ADMIN — LINEZOLID 600 MG: 600 INJECTION, SOLUTION INTRAVENOUS at 08:35

## 2021-01-01 RX ADMIN — Medication 1 AMPULE: at 21:08

## 2021-01-01 RX ADMIN — HUMAN INSULIN 9 UNITS: 100 INJECTION, SOLUTION SUBCUTANEOUS at 21:04

## 2021-01-01 RX ADMIN — HUMAN INSULIN 6 UNITS: 100 INJECTION, SOLUTION SUBCUTANEOUS at 06:03

## 2021-01-01 RX ADMIN — SODIUM CHLORIDE 2117 ML: 900 INJECTION, SOLUTION INTRAVENOUS at 21:43

## 2021-01-01 RX ADMIN — INSULIN HUMAN 4 UNITS: 100 INJECTION, SOLUTION PARENTERAL at 00:35

## 2021-01-01 RX ADMIN — HUMAN INSULIN 6 UNITS: 100 INJECTION, SOLUTION SUBCUTANEOUS at 21:26

## 2021-01-01 RX ADMIN — DEXMEDETOMIDINE HYDROCHLORIDE 1 MCG/KG/HR: 100 INJECTION, SOLUTION, CONCENTRATE INTRAVENOUS at 23:43

## 2021-01-01 RX ADMIN — MEROPENEM 500 MG: 500 INJECTION, POWDER, FOR SOLUTION INTRAVENOUS at 23:26

## 2021-01-01 RX ADMIN — FUROSEMIDE 40 MG: 40 TABLET ORAL at 09:19

## 2021-01-01 RX ADMIN — HEPARIN SODIUM 5000 UNITS: 5000 INJECTION INTRAVENOUS; SUBCUTANEOUS at 00:18

## 2021-01-01 RX ADMIN — INSULIN GLARGINE 12 UNITS: 100 INJECTION, SOLUTION SUBCUTANEOUS at 09:48

## 2021-01-01 RX ADMIN — INSULIN GLARGINE 20 UNITS: 100 INJECTION, SOLUTION SUBCUTANEOUS at 08:55

## 2021-01-01 RX ADMIN — OXYCODONE 10 MG: 5 TABLET ORAL at 11:35

## 2021-01-01 RX ADMIN — Medication 1 AMPULE: at 21:05

## 2021-01-01 RX ADMIN — INSULIN GLARGINE 14 UNITS: 100 INJECTION, SOLUTION SUBCUTANEOUS at 10:00

## 2021-01-01 RX ADMIN — HUMAN INSULIN 12 UNITS: 100 INJECTION, SOLUTION SUBCUTANEOUS at 12:22

## 2021-01-01 RX ADMIN — ALBUTEROL SULFATE 2.5 MG: 2.5 SOLUTION RESPIRATORY (INHALATION) at 20:18

## 2021-01-01 RX ADMIN — INSULIN LISPRO 10 UNITS: 100 INJECTION, SOLUTION INTRAVENOUS; SUBCUTANEOUS at 15:41

## 2021-01-01 RX ADMIN — HYDRALAZINE HYDROCHLORIDE 50 MG: 50 TABLET, FILM COATED ORAL at 18:08

## 2021-01-01 RX ADMIN — INSULIN GLARGINE 10 UNITS: 100 INJECTION, SOLUTION SUBCUTANEOUS at 21:46

## 2021-01-01 RX ADMIN — Medication 1 AMPULE: at 21:52

## 2021-01-01 RX ADMIN — LIDOCAINE HYDROCHLORIDE 100 MG: 20 INJECTION, SOLUTION EPIDURAL; INFILTRATION; INTRACAUDAL; PERINEURAL at 08:41

## 2021-01-01 RX ADMIN — MAGNESIUM SULFATE HEPTAHYDRATE 2 G: 40 INJECTION, SOLUTION INTRAVENOUS at 23:41

## 2021-01-01 RX ADMIN — ALBUMIN (HUMAN) 25 G: 0.25 INJECTION, SOLUTION INTRAVENOUS at 17:37

## 2021-01-01 RX ADMIN — INSULIN LISPRO 6 UNITS: 100 INJECTION, SOLUTION INTRAVENOUS; SUBCUTANEOUS at 16:44

## 2021-01-01 RX ADMIN — INSULIN GLARGINE 20 UNITS: 100 INJECTION, SOLUTION SUBCUTANEOUS at 08:21

## 2021-01-01 RX ADMIN — RDII 250 MG CAPSULE 250 MG: at 18:47

## 2021-01-01 RX ADMIN — NOREPINEPHRINE-DEXTROSE IV SOLUTION 4 MG/250ML-5% 8 MCG/MIN: 4-5/250 SOLUTION at 03:58

## 2021-01-01 RX ADMIN — ALBUTEROL SULFATE 2.5 MG: 2.5 SOLUTION RESPIRATORY (INHALATION) at 15:01

## 2021-01-01 RX ADMIN — Medication 10 ML: at 21:42

## 2021-01-01 RX ADMIN — HUMAN INSULIN 6 UNITS: 100 INJECTION, SOLUTION SUBCUTANEOUS at 23:18

## 2021-01-01 RX ADMIN — SODIUM CHLORIDE 10 ML: 9 INJECTION, SOLUTION INTRAMUSCULAR; INTRAVENOUS; SUBCUTANEOUS at 21:58

## 2021-01-01 RX ADMIN — LORAZEPAM 1 MG: 2 INJECTION INTRAMUSCULAR; INTRAVENOUS at 09:42

## 2021-01-01 RX ADMIN — ASPIRIN 81 MG: 81 TABLET ORAL at 08:38

## 2021-01-01 RX ADMIN — ALUMINUM HYDROXIDE, MAGNESIUM HYDROXIDE, DIMETHICONE 5 ML: 200; 200; 20 LIQUID ORAL at 00:00

## 2021-01-01 RX ADMIN — SODIUM CHLORIDE 250 ML: 900 INJECTION, SOLUTION INTRAVENOUS at 22:05

## 2021-01-01 RX ADMIN — DEXMEDETOMIDINE HYDROCHLORIDE 1 MCG/KG/HR: 100 INJECTION, SOLUTION INTRAVENOUS at 00:53

## 2021-01-01 RX ADMIN — CLOPIDOGREL BISULFATE 75 MG: 75 TABLET ORAL at 08:14

## 2021-01-01 RX ADMIN — FUROSEMIDE 40 MG: 10 INJECTION, SOLUTION INTRAMUSCULAR; INTRAVENOUS at 16:18

## 2021-01-01 RX ADMIN — SODIUM CHLORIDE 10 ML: 9 INJECTION, SOLUTION INTRAMUSCULAR; INTRAVENOUS; SUBCUTANEOUS at 05:15

## 2021-01-01 RX ADMIN — RDII 250 MG CAPSULE 250 MG: at 08:28

## 2021-01-01 RX ADMIN — RDII 250 MG CAPSULE 250 MG: at 17:33

## 2021-01-01 RX ADMIN — Medication 200 MCG/HR: at 15:48

## 2021-01-01 RX ADMIN — HYDRALAZINE HYDROCHLORIDE 10 MG: 10 TABLET, FILM COATED ORAL at 21:50

## 2021-01-01 RX ADMIN — HYDRALAZINE HYDROCHLORIDE 50 MG: 50 TABLET, FILM COATED ORAL at 12:03

## 2021-01-01 RX ADMIN — HEPARIN SODIUM 5000 UNITS: 5000 INJECTION INTRAVENOUS; SUBCUTANEOUS at 16:05

## 2021-01-01 RX ADMIN — HUMAN INSULIN 6 UNITS: 100 INJECTION, SOLUTION SUBCUTANEOUS at 05:50

## 2021-01-01 RX ADMIN — IPRATROPIUM BROMIDE AND ALBUTEROL SULFATE 3 ML: .5; 3 SOLUTION RESPIRATORY (INHALATION) at 11:18

## 2021-01-01 RX ADMIN — HEPARIN SODIUM 5000 UNITS: 5000 INJECTION INTRAVENOUS; SUBCUTANEOUS at 16:53

## 2021-01-01 RX ADMIN — HEPARIN SODIUM 5000 UNITS: 5000 INJECTION INTRAVENOUS; SUBCUTANEOUS at 08:01

## 2021-01-01 RX ADMIN — HYDRALAZINE HYDROCHLORIDE 10 MG: 10 TABLET, FILM COATED ORAL at 21:28

## 2021-01-01 RX ADMIN — CLOPIDOGREL BISULFATE 75 MG: 75 TABLET ORAL at 11:07

## 2021-01-01 RX ADMIN — LORAZEPAM 2 MG: 2 INJECTION INTRAMUSCULAR; INTRAVENOUS at 14:29

## 2021-01-01 RX ADMIN — DEXMEDETOMIDINE HYDROCHLORIDE 1.5 MCG/KG/HR: 100 INJECTION, SOLUTION INTRAVENOUS at 05:10

## 2021-01-01 RX ADMIN — HEPARIN SODIUM 5000 UNITS: 5000 INJECTION INTRAVENOUS; SUBCUTANEOUS at 09:12

## 2021-01-01 RX ADMIN — Medication 200 MCG/HR: at 18:53

## 2021-01-01 RX ADMIN — SODIUM CHLORIDE 10 ML: 9 INJECTION, SOLUTION INTRAMUSCULAR; INTRAVENOUS; SUBCUTANEOUS at 05:53

## 2021-01-01 RX ADMIN — INSULIN LISPRO 3 UNITS: 100 INJECTION, SOLUTION INTRAVENOUS; SUBCUTANEOUS at 06:00

## 2021-01-01 RX ADMIN — VANCOMYCIN HYDROCHLORIDE 1000 MG: 1 INJECTION, POWDER, LYOPHILIZED, FOR SOLUTION INTRAVENOUS at 14:50

## 2021-01-01 RX ADMIN — FAMOTIDINE 20 MG: 10 INJECTION INTRAVENOUS at 08:52

## 2021-01-01 RX ADMIN — INSULIN GLARGINE 30 UNITS: 100 INJECTION, SOLUTION SUBCUTANEOUS at 21:19

## 2021-01-01 RX ADMIN — ASPIRIN 81 MG: 81 TABLET ORAL at 09:19

## 2021-01-01 RX ADMIN — ACETAMINOPHEN 650 MG: 325 TABLET ORAL at 00:57

## 2021-01-01 RX ADMIN — INSULIN LISPRO 6 UNITS: 100 INJECTION, SOLUTION INTRAVENOUS; SUBCUTANEOUS at 11:25

## 2021-01-01 RX ADMIN — PANTOPRAZOLE SODIUM 40 MG: 40 TABLET, DELAYED RELEASE ORAL at 06:24

## 2021-01-01 RX ADMIN — PROPOFOL 35 MCG/KG/MIN: 10 INJECTION, EMULSION INTRAVENOUS at 23:19

## 2021-01-01 RX ADMIN — INSULIN LISPRO 2 UNITS: 100 INJECTION, SOLUTION INTRAVENOUS; SUBCUTANEOUS at 16:56

## 2021-01-01 RX ADMIN — INSULIN GLARGINE 3 UNITS: 100 INJECTION, SOLUTION SUBCUTANEOUS at 08:29

## 2021-01-01 RX ADMIN — IPRATROPIUM BROMIDE AND ALBUTEROL SULFATE 3 ML: .5; 3 SOLUTION RESPIRATORY (INHALATION) at 20:39

## 2021-01-01 RX ADMIN — ALBUTEROL SULFATE 2.5 MG: 2.5 SOLUTION RESPIRATORY (INHALATION) at 08:30

## 2021-01-01 RX ADMIN — GABAPENTIN 100 MG: 100 CAPSULE ORAL at 21:05

## 2021-01-01 RX ADMIN — HUMAN INSULIN 6 UNITS: 100 INJECTION, SOLUTION SUBCUTANEOUS at 08:46

## 2021-01-01 RX ADMIN — INSULIN LISPRO 10 UNITS: 100 INJECTION, SOLUTION INTRAVENOUS; SUBCUTANEOUS at 22:21

## 2021-01-01 RX ADMIN — RISPERIDONE 0.5 MG: 1 TABLET, FILM COATED ORAL at 18:40

## 2021-01-01 RX ADMIN — RDII 250 MG CAPSULE 250 MG: at 08:45

## 2021-01-01 RX ADMIN — INSULIN GLARGINE 30 UNITS: 100 INJECTION, SOLUTION SUBCUTANEOUS at 21:00

## 2021-01-01 RX ADMIN — HUMAN INSULIN 9 UNITS: 100 INJECTION, SOLUTION SUBCUTANEOUS at 11:19

## 2021-01-01 RX ADMIN — FAMOTIDINE 20 MG: 20 TABLET, FILM COATED ORAL at 09:25

## 2021-01-01 RX ADMIN — INSULIN GLARGINE 15 UNITS: 100 INJECTION, SOLUTION SUBCUTANEOUS at 08:20

## 2021-01-01 RX ADMIN — HEPARIN SODIUM 5000 UNITS: 5000 INJECTION INTRAVENOUS; SUBCUTANEOUS at 08:25

## 2021-01-01 RX ADMIN — METHYLPREDNISOLONE SODIUM SUCCINATE 40 MG: 40 INJECTION, POWDER, FOR SOLUTION INTRAMUSCULAR; INTRAVENOUS at 06:24

## 2021-01-01 RX ADMIN — VANCOMYCIN HYDROCHLORIDE 1000 MG: 1 INJECTION, POWDER, LYOPHILIZED, FOR SOLUTION INTRAVENOUS at 16:33

## 2021-01-01 RX ADMIN — DEXMEDETOMIDINE HYDROCHLORIDE 0.8 MCG/KG/HR: 100 INJECTION, SOLUTION, CONCENTRATE INTRAVENOUS at 17:03

## 2021-01-01 RX ADMIN — IPRATROPIUM BROMIDE AND ALBUTEROL SULFATE 3 ML: .5; 3 SOLUTION RESPIRATORY (INHALATION) at 03:04

## 2021-01-01 RX ADMIN — IPRATROPIUM BROMIDE AND ALBUTEROL SULFATE 3 ML: .5; 3 SOLUTION RESPIRATORY (INHALATION) at 23:13

## 2021-01-01 RX ADMIN — FUROSEMIDE 40 MG: 10 INJECTION, SOLUTION INTRAMUSCULAR; INTRAVENOUS at 13:05

## 2021-01-01 RX ADMIN — DEXMEDETOMIDINE HYDROCHLORIDE 0.8 MCG/KG/HR: 100 INJECTION, SOLUTION INTRAVENOUS at 17:37

## 2021-01-01 RX ADMIN — HEPARIN SODIUM 5000 UNITS: 5000 INJECTION INTRAVENOUS; SUBCUTANEOUS at 06:00

## 2021-01-01 RX ADMIN — HEPARIN SODIUM 5000 UNITS: 5000 INJECTION INTRAVENOUS; SUBCUTANEOUS at 08:33

## 2021-01-01 RX ADMIN — LEVETIRACETAM 500 MG: 100 INJECTION, SOLUTION INTRAVENOUS at 00:33

## 2021-01-01 RX ADMIN — BISACODYL 10 MG: 10 SUPPOSITORY RECTAL at 17:54

## 2021-01-01 RX ADMIN — DIATRIZOATE MEGLUMINE AND DIATRIZOATE SODIUM 30 ML: 660; 100 LIQUID ORAL; RECTAL at 09:19

## 2021-01-01 RX ADMIN — ALBUTEROL SULFATE 2.5 MG: 2.5 SOLUTION RESPIRATORY (INHALATION) at 13:48

## 2021-01-01 RX ADMIN — HEPARIN SODIUM 5000 UNITS: 5000 INJECTION INTRAVENOUS; SUBCUTANEOUS at 22:20

## 2021-01-01 RX ADMIN — GABAPENTIN 100 MG: 100 CAPSULE ORAL at 08:34

## 2021-01-01 RX ADMIN — ALBUTEROL SULFATE 2.5 MG: 2.5 SOLUTION RESPIRATORY (INHALATION) at 20:59

## 2021-01-01 RX ADMIN — ALBUTEROL SULFATE 2.5 MG: 2.5 SOLUTION RESPIRATORY (INHALATION) at 13:37

## 2021-01-01 RX ADMIN — Medication 5 ML: at 15:15

## 2021-01-01 RX ADMIN — ALBUTEROL SULFATE 2.5 MG: 2.5 SOLUTION RESPIRATORY (INHALATION) at 08:29

## 2021-01-01 RX ADMIN — INSULIN HUMAN 6 UNITS: 100 INJECTION, SOLUTION PARENTERAL at 11:17

## 2021-01-01 RX ADMIN — HUMAN INSULIN 3 UNITS: 100 INJECTION, SOLUTION SUBCUTANEOUS at 21:54

## 2021-01-01 RX ADMIN — SODIUM CHLORIDE 75 ML/HR: 900 INJECTION, SOLUTION INTRAVENOUS at 05:08

## 2021-01-01 RX ADMIN — GABAPENTIN 100 MG: 100 CAPSULE ORAL at 20:05

## 2021-01-01 RX ADMIN — SODIUM CHLORIDE 40 ML: 9 INJECTION, SOLUTION INTRAMUSCULAR; INTRAVENOUS; SUBCUTANEOUS at 21:33

## 2021-01-01 RX ADMIN — ALBUTEROL SULFATE 2.5 MG: 2.5 SOLUTION RESPIRATORY (INHALATION) at 17:25

## 2021-01-01 RX ADMIN — INSULIN LISPRO 6 UNITS: 100 INJECTION, SOLUTION INTRAVENOUS; SUBCUTANEOUS at 22:45

## 2021-01-01 RX ADMIN — Medication 10 ML: at 14:56

## 2021-01-01 RX ADMIN — BISACODYL 10 MG: 10 SUPPOSITORY RECTAL at 13:42

## 2021-01-01 RX ADMIN — HYDRALAZINE HYDROCHLORIDE 10 MG: 10 TABLET, FILM COATED ORAL at 16:37

## 2021-01-01 RX ADMIN — OXYCODONE 10 MG: 5 TABLET ORAL at 05:48

## 2021-01-01 RX ADMIN — HYDROMORPHONE HYDROCHLORIDE 2 MG: 1 INJECTION, SOLUTION INTRAMUSCULAR; INTRAVENOUS; SUBCUTANEOUS at 08:09

## 2021-01-01 RX ADMIN — Medication 1 AMPULE: at 20:47

## 2021-01-01 RX ADMIN — ALBUTEROL SULFATE 2.5 MG: 2.5 SOLUTION RESPIRATORY (INHALATION) at 13:28

## 2021-01-01 RX ADMIN — HYDRALAZINE HYDROCHLORIDE 20 MG: 20 INJECTION INTRAMUSCULAR; INTRAVENOUS at 11:29

## 2021-01-01 RX ADMIN — CLOPIDOGREL BISULFATE 75 MG: 75 TABLET ORAL at 08:07

## 2021-01-01 RX ADMIN — HEPARIN SODIUM 5000 UNITS: 5000 INJECTION INTRAVENOUS; SUBCUTANEOUS at 00:01

## 2021-01-01 RX ADMIN — CLOPIDOGREL BISULFATE 75 MG: 75 TABLET ORAL at 09:34

## 2021-01-01 RX ADMIN — DEXMEDETOMIDINE HYDROCHLORIDE 1.5 MCG/KG/HR: 100 INJECTION, SOLUTION INTRAVENOUS at 18:25

## 2021-01-01 RX ADMIN — INSULIN HUMAN 2 UNITS: 100 INJECTION, SOLUTION PARENTERAL at 05:57

## 2021-01-01 RX ADMIN — FUROSEMIDE 20 MG/HR: 10 INJECTION, SOLUTION INTRAMUSCULAR; INTRAVENOUS at 12:03

## 2021-01-01 RX ADMIN — ALBUTEROL SULFATE 2.5 MG: 2.5 SOLUTION RESPIRATORY (INHALATION) at 09:02

## 2021-01-01 RX ADMIN — ALBUTEROL SULFATE 2.5 MG: 2.5 SOLUTION RESPIRATORY (INHALATION) at 16:51

## 2021-01-01 RX ADMIN — HYDRALAZINE HYDROCHLORIDE 10 MG: 10 TABLET, FILM COATED ORAL at 08:00

## 2021-01-01 RX ADMIN — FUROSEMIDE 80 MG: 10 INJECTION, SOLUTION INTRAMUSCULAR; INTRAVENOUS at 08:29

## 2021-01-01 RX ADMIN — ALBUTEROL SULFATE 2.5 MG: 2.5 SOLUTION RESPIRATORY (INHALATION) at 08:49

## 2021-01-01 RX ADMIN — SODIUM CHLORIDE 10 ML: 9 INJECTION, SOLUTION INTRAMUSCULAR; INTRAVENOUS; SUBCUTANEOUS at 22:27

## 2021-01-01 RX ADMIN — HUMAN INSULIN 3 UNITS: 100 INJECTION, SOLUTION SUBCUTANEOUS at 12:47

## 2021-01-01 RX ADMIN — Medication 1 AMPULE: at 07:57

## 2021-01-01 RX ADMIN — HEPARIN SODIUM 5000 UNITS: 5000 INJECTION INTRAVENOUS; SUBCUTANEOUS at 16:33

## 2021-01-01 RX ADMIN — METHADONE HYDROCHLORIDE 10 MG: 5 TABLET ORAL at 08:24

## 2021-01-01 RX ADMIN — BUDESONIDE 500 MCG: 0.5 INHALANT RESPIRATORY (INHALATION) at 20:43

## 2021-01-01 RX ADMIN — ALBUTEROL SULFATE 2.5 MG: 2.5 SOLUTION RESPIRATORY (INHALATION) at 22:00

## 2021-01-01 RX ADMIN — LORAZEPAM 2 MG: 2 INJECTION INTRAMUSCULAR; INTRAVENOUS at 22:21

## 2021-01-01 RX ADMIN — METOPROLOL TARTRATE 12.5 MG: 25 TABLET, FILM COATED ORAL at 08:30

## 2021-01-01 RX ADMIN — HYDRALAZINE HYDROCHLORIDE 10 MG: 20 INJECTION INTRAMUSCULAR; INTRAVENOUS at 01:19

## 2021-01-01 RX ADMIN — ALBUTEROL SULFATE 2.5 MG: 2.5 SOLUTION RESPIRATORY (INHALATION) at 06:09

## 2021-01-01 RX ADMIN — FAMOTIDINE 20 MG: 20 TABLET, FILM COATED ORAL at 08:25

## 2021-01-01 RX ADMIN — SODIUM CHLORIDE SOLN NEBU 3% 4 ML: 3 NEBU SOLN at 20:02

## 2021-01-01 RX ADMIN — FENTANYL CITRATE 50 MCG: 50 INJECTION INTRAMUSCULAR; INTRAVENOUS at 19:44

## 2021-01-01 RX ADMIN — RISPERIDONE 1 MG: 1 TABLET ORAL at 20:49

## 2021-01-01 RX ADMIN — Medication 20 MCG/KG/MIN: at 14:08

## 2021-01-01 RX ADMIN — RISPERIDONE 0.5 MG: 1 SOLUTION ORAL at 21:03

## 2021-01-01 RX ADMIN — HYDRALAZINE HYDROCHLORIDE 10 MG: 10 TABLET, FILM COATED ORAL at 08:20

## 2021-01-01 RX ADMIN — ALBUTEROL SULFATE 2.5 MG: 2.5 SOLUTION RESPIRATORY (INHALATION) at 09:15

## 2021-01-01 RX ADMIN — BARIUM SULFATE 15 ML: 400 PASTE ORAL at 10:09

## 2021-01-01 RX ADMIN — INSULIN GLARGINE 10 UNITS: 100 INJECTION, SOLUTION SUBCUTANEOUS at 13:35

## 2021-01-01 RX ADMIN — MEROPENEM 500 MG: 500 INJECTION, POWDER, FOR SOLUTION INTRAVENOUS at 16:09

## 2021-01-01 RX ADMIN — Medication 1 AMPULE: at 09:14

## 2021-01-01 RX ADMIN — Medication 200 MCG/HR: at 16:42

## 2021-01-01 RX ADMIN — INSULIN GLARGINE 30 UNITS: 100 INJECTION, SOLUTION SUBCUTANEOUS at 21:12

## 2021-01-01 RX ADMIN — SPIRONOLACTONE 12.5 MG: 25 TABLET ORAL at 10:51

## 2021-01-01 RX ADMIN — LEVETIRACETAM 500 MG: 100 INJECTION, SOLUTION INTRAVENOUS at 11:03

## 2021-01-01 RX ADMIN — Medication 1 AMPULE: at 08:27

## 2021-01-01 RX ADMIN — GUAIFENESIN 100 MG: 100 SOLUTION ORAL at 18:00

## 2021-01-01 RX ADMIN — INSULIN GLARGINE 20 UNITS: 100 INJECTION, SOLUTION SUBCUTANEOUS at 09:00

## 2021-01-01 RX ADMIN — ALUMINUM HYDROXIDE, MAGNESIUM HYDROXIDE, DIMETHICONE 5 ML: 200; 200; 20 LIQUID ORAL at 18:24

## 2021-01-01 RX ADMIN — SODIUM ZIRCONIUM CYCLOSILICATE 10 G: 10 POWDER, FOR SUSPENSION ORAL at 09:24

## 2021-01-01 RX ADMIN — FAMOTIDINE 20 MG: 20 TABLET, FILM COATED ORAL at 08:22

## 2021-01-01 RX ADMIN — HUMAN INSULIN 12 UNITS: 100 INJECTION, SOLUTION SUBCUTANEOUS at 21:29

## 2021-01-01 RX ADMIN — PHENYLEPHRINE HYDROCHLORIDE 100 MCG: 10 INJECTION INTRAVENOUS at 08:51

## 2021-01-01 RX ADMIN — SODIUM CHLORIDE SOLN NEBU 3% 4 ML: 3 NEBU SOLN at 20:00

## 2021-01-01 RX ADMIN — HEPARIN SODIUM 5000 UNITS: 5000 INJECTION INTRAVENOUS; SUBCUTANEOUS at 06:12

## 2021-01-01 RX ADMIN — SODIUM CHLORIDE SOLN NEBU 3% 4 ML: 3 NEBU SOLN at 09:02

## 2021-01-01 RX ADMIN — Medication 10 ML: at 05:37

## 2021-01-01 RX ADMIN — OXYCODONE 10 MG: 5 TABLET ORAL at 23:28

## 2021-01-01 RX ADMIN — Medication 5 ML: at 05:35

## 2021-01-01 RX ADMIN — POLYETHYLENE GLYCOL 3350 17 G: 17 POWDER, FOR SOLUTION ORAL at 09:52

## 2021-01-01 RX ADMIN — ACETAMINOPHEN 650 MG: 325 TABLET, FILM COATED ORAL at 22:39

## 2021-01-01 RX ADMIN — METHYLPREDNISOLONE SODIUM SUCCINATE 40 MG: 40 INJECTION, POWDER, FOR SOLUTION INTRAMUSCULAR; INTRAVENOUS at 05:24

## 2021-01-01 RX ADMIN — METOPROLOL TARTRATE 12.5 MG: 25 TABLET, FILM COATED ORAL at 09:58

## 2021-01-01 RX ADMIN — GABAPENTIN 100 MG: 100 CAPSULE ORAL at 16:09

## 2021-01-01 RX ADMIN — Medication 175 MCG/HR: at 18:46

## 2021-01-01 RX ADMIN — METHADONE HYDROCHLORIDE 10 MG: 5 TABLET ORAL at 18:03

## 2021-01-01 RX ADMIN — FENTANYL CITRATE 50 MCG: 0.05 INJECTION, SOLUTION INTRAMUSCULAR; INTRAVENOUS at 00:30

## 2021-01-01 RX ADMIN — FUROSEMIDE 40 MG: 10 INJECTION, SOLUTION INTRAMUSCULAR; INTRAVENOUS at 21:45

## 2021-01-01 RX ADMIN — ALBUTEROL SULFATE 2.5 MG: 2.5 SOLUTION RESPIRATORY (INHALATION) at 09:09

## 2021-01-01 RX ADMIN — INSULIN GLARGINE 30 UNITS: 100 INJECTION, SOLUTION SUBCUTANEOUS at 21:07

## 2021-01-01 RX ADMIN — METOPROLOL SUCCINATE 12.5 MG: 25 TABLET, EXTENDED RELEASE ORAL at 08:46

## 2021-01-01 RX ADMIN — DEXMEDETOMIDINE HYDROCHLORIDE 1.2 MCG/KG/HR: 100 INJECTION, SOLUTION, CONCENTRATE INTRAVENOUS at 10:48

## 2021-01-01 RX ADMIN — GABAPENTIN 100 MG: 100 CAPSULE ORAL at 22:01

## 2021-01-01 RX ADMIN — SODIUM CHLORIDE 10 ML: 9 INJECTION, SOLUTION INTRAMUSCULAR; INTRAVENOUS; SUBCUTANEOUS at 17:19

## 2021-01-01 RX ADMIN — PROPOFOL 40 MG: 10 INJECTION, EMULSION INTRAVENOUS at 08:41

## 2021-01-01 RX ADMIN — HYDRALAZINE HYDROCHLORIDE 10 MG: 10 TABLET, FILM COATED ORAL at 09:00

## 2021-01-01 RX ADMIN — ALBUTEROL SULFATE 2.5 MG: 2.5 SOLUTION RESPIRATORY (INHALATION) at 08:00

## 2021-01-01 RX ADMIN — SODIUM CHLORIDE 40 ML: 9 INJECTION, SOLUTION INTRAMUSCULAR; INTRAVENOUS; SUBCUTANEOUS at 13:45

## 2021-01-01 RX ADMIN — DEXMEDETOMIDINE HYDROCHLORIDE 0.9 MCG/KG/HR: 100 INJECTION, SOLUTION INTRAVENOUS at 14:29

## 2021-01-01 RX ADMIN — CEFTRIAXONE SODIUM 2 G: 2 INJECTION, POWDER, FOR SOLUTION INTRAMUSCULAR; INTRAVENOUS at 09:17

## 2021-01-01 RX ADMIN — HEPARIN SODIUM 5000 UNITS: 5000 INJECTION INTRAVENOUS; SUBCUTANEOUS at 23:36

## 2021-01-01 RX ADMIN — INSULIN LISPRO 2 UNITS: 100 INJECTION, SOLUTION INTRAVENOUS; SUBCUTANEOUS at 21:59

## 2021-01-01 RX ADMIN — GABAPENTIN 100 MG: 100 CAPSULE ORAL at 19:35

## 2021-01-01 RX ADMIN — PIPERACILLIN SODIUM AND TAZOBACTAM SODIUM 3.38 G: 3; .375 INJECTION, POWDER, LYOPHILIZED, FOR SOLUTION INTRAVENOUS at 17:15

## 2021-01-01 RX ADMIN — LEVETIRACETAM 500 MG: 100 INJECTION, SOLUTION INTRAVENOUS at 12:08

## 2021-01-01 RX ADMIN — Medication 100 MCG/HR: at 21:32

## 2021-01-01 RX ADMIN — ALBUTEROL SULFATE 2.5 MG: 2.5 SOLUTION RESPIRATORY (INHALATION) at 20:28

## 2021-01-01 RX ADMIN — HUMAN INSULIN 6 UNITS: 100 INJECTION, SOLUTION SUBCUTANEOUS at 17:30

## 2021-01-01 RX ADMIN — SODIUM CHLORIDE 80 MG: 9 INJECTION, SOLUTION INTRAMUSCULAR; INTRAVENOUS; SUBCUTANEOUS at 20:50

## 2021-01-01 RX ADMIN — ALBUTEROL SULFATE 2.5 MG: 2.5 SOLUTION RESPIRATORY (INHALATION) at 20:37

## 2021-01-01 RX ADMIN — SODIUM CHLORIDE 10 ML: 9 INJECTION, SOLUTION INTRAMUSCULAR; INTRAVENOUS; SUBCUTANEOUS at 21:55

## 2021-01-01 RX ADMIN — RISPERIDONE 1 MG: 1 SOLUTION ORAL at 09:34

## 2021-01-01 RX ADMIN — HYDRALAZINE HYDROCHLORIDE 50 MG: 50 TABLET, FILM COATED ORAL at 05:55

## 2021-01-01 RX ADMIN — INSULIN LISPRO 6 UNITS: 100 INJECTION, SOLUTION INTRAVENOUS; SUBCUTANEOUS at 17:38

## 2021-01-01 RX ADMIN — LEVETIRACETAM 500 MG: 100 INJECTION, SOLUTION INTRAVENOUS at 10:17

## 2021-01-01 RX ADMIN — HUMAN INSULIN 3 UNITS: 100 INJECTION, SOLUTION SUBCUTANEOUS at 21:19

## 2021-01-01 RX ADMIN — FENTANYL CITRATE 50 MCG: 50 INJECTION INTRAMUSCULAR; INTRAVENOUS at 05:41

## 2021-01-01 RX ADMIN — INSULIN HUMAN 2 UNITS: 100 INJECTION, SOLUTION PARENTERAL at 17:31

## 2021-01-01 RX ADMIN — SODIUM CHLORIDE SOLN NEBU 3% 4 ML: 3 NEBU SOLN at 19:56

## 2021-01-01 RX ADMIN — ALBUTEROL SULFATE 2.5 MG: 2.5 SOLUTION RESPIRATORY (INHALATION) at 14:01

## 2021-01-01 RX ADMIN — INSULIN LISPRO 2 UNITS: 100 INJECTION, SOLUTION INTRAVENOUS; SUBCUTANEOUS at 08:36

## 2021-01-01 RX ADMIN — METOPROLOL SUCCINATE 12.5 MG: 25 TABLET, EXTENDED RELEASE ORAL at 08:47

## 2021-01-01 RX ADMIN — INSULIN LISPRO 6 UNITS: 100 INJECTION, SOLUTION INTRAVENOUS; SUBCUTANEOUS at 08:39

## 2021-01-01 RX ADMIN — HUMAN INSULIN 9 UNITS: 100 INJECTION, SOLUTION SUBCUTANEOUS at 08:25

## 2021-01-01 RX ADMIN — METHYLPREDNISOLONE SODIUM SUCCINATE 40 MG: 40 INJECTION, POWDER, FOR SOLUTION INTRAMUSCULAR; INTRAVENOUS at 17:48

## 2021-01-01 RX ADMIN — BUDESONIDE 500 MCG: 0.5 INHALANT RESPIRATORY (INHALATION) at 20:39

## 2021-01-01 RX ADMIN — METOPROLOL TARTRATE 12.5 MG: 25 TABLET, FILM COATED ORAL at 21:04

## 2021-01-01 RX ADMIN — CEFTRIAXONE 1 G: 1 INJECTION, POWDER, FOR SOLUTION INTRAMUSCULAR; INTRAVENOUS at 17:52

## 2021-01-01 RX ADMIN — ALBUTEROL SULFATE 2.5 MG: 2.5 SOLUTION RESPIRATORY (INHALATION) at 01:17

## 2021-01-01 RX ADMIN — SODIUM CHLORIDE, SODIUM LACTATE, POTASSIUM CHLORIDE, AND CALCIUM CHLORIDE: 600; 310; 30; 20 INJECTION, SOLUTION INTRAVENOUS at 12:44

## 2021-01-01 RX ADMIN — MORPHINE SULFATE 2 MG: 2 INJECTION, SOLUTION INTRAMUSCULAR; INTRAVENOUS at 22:02

## 2021-01-01 RX ADMIN — ALUMINUM HYDROXIDE, MAGNESIUM HYDROXIDE, DIMETHICONE 5 ML: 200; 200; 20 LIQUID ORAL at 05:55

## 2021-01-01 RX ADMIN — SODIUM CHLORIDE 10 ML: 9 INJECTION, SOLUTION INTRAMUSCULAR; INTRAVENOUS; SUBCUTANEOUS at 05:02

## 2021-01-01 RX ADMIN — DEXMEDETOMIDINE HYDROCHLORIDE 1.5 MCG/KG/HR: 100 INJECTION, SOLUTION INTRAVENOUS at 00:51

## 2021-01-01 RX ADMIN — MEROPENEM 500 MG: 500 INJECTION, POWDER, FOR SOLUTION INTRAVENOUS at 00:35

## 2021-01-01 RX ADMIN — HYDROMORPHONE HYDROCHLORIDE 2 MG: 1 INJECTION, SOLUTION INTRAMUSCULAR; INTRAVENOUS; SUBCUTANEOUS at 07:43

## 2021-01-01 RX ADMIN — LOSARTAN POTASSIUM 100 MG: 50 TABLET, FILM COATED ORAL at 07:57

## 2021-01-01 RX ADMIN — Medication 1 AMPULE: at 08:53

## 2021-01-01 RX ADMIN — Medication 5 ML: at 22:50

## 2021-01-01 RX ADMIN — ALBUTEROL SULFATE 2.5 MG: 2.5 SOLUTION RESPIRATORY (INHALATION) at 14:14

## 2021-01-01 RX ADMIN — RDII 250 MG CAPSULE 250 MG: at 18:23

## 2021-01-01 RX ADMIN — LINEZOLID 600 MG: 600 TABLET, FILM COATED ORAL at 21:56

## 2021-01-01 RX ADMIN — MIDAZOLAM 3 MG/HR: 5 INJECTION, SOLUTION INTRAMUSCULAR; INTRAVENOUS at 05:30

## 2021-01-01 RX ADMIN — Medication 1 AMPULE: at 08:34

## 2021-01-01 RX ADMIN — DEXMEDETOMIDINE HYDROCHLORIDE 1 MCG/KG/HR: 100 INJECTION, SOLUTION, CONCENTRATE INTRAVENOUS at 05:17

## 2021-01-01 RX ADMIN — HEPARIN SODIUM 5000 UNITS: 5000 INJECTION INTRAVENOUS; SUBCUTANEOUS at 08:20

## 2021-01-01 RX ADMIN — Medication 20 ML: at 04:26

## 2021-01-01 RX ADMIN — MEROPENEM 500 MG: 500 INJECTION, POWDER, FOR SOLUTION INTRAVENOUS at 08:12

## 2021-01-01 RX ADMIN — RDII 250 MG CAPSULE 250 MG: at 17:49

## 2021-01-01 RX ADMIN — Medication 5 ML: at 21:38

## 2021-01-01 RX ADMIN — DEXMEDETOMIDINE HYDROCHLORIDE 1 MCG/KG/HR: 100 INJECTION, SOLUTION, CONCENTRATE INTRAVENOUS at 11:06

## 2021-01-01 RX ADMIN — FENTANYL CITRATE 50 MCG: 0.05 INJECTION, SOLUTION INTRAMUSCULAR; INTRAVENOUS at 14:15

## 2021-01-01 RX ADMIN — Medication 10 ML: at 13:51

## 2021-01-01 RX ADMIN — ALBUTEROL SULFATE 2.5 MG: 2.5 SOLUTION RESPIRATORY (INHALATION) at 20:38

## 2021-01-01 RX ADMIN — HYDRALAZINE HYDROCHLORIDE 10 MG: 20 INJECTION, SOLUTION INTRAMUSCULAR; INTRAVENOUS at 16:45

## 2021-01-01 RX ADMIN — INSULIN LISPRO 3 UNITS: 100 INJECTION, SOLUTION INTRAVENOUS; SUBCUTANEOUS at 07:55

## 2021-01-01 RX ADMIN — ALUMINUM HYDROXIDE, MAGNESIUM HYDROXIDE, DIMETHICONE 5 ML: 200; 200; 20 LIQUID ORAL at 06:27

## 2021-01-01 RX ADMIN — HUMAN INSULIN 3 UNITS: 100 INJECTION, SOLUTION SUBCUTANEOUS at 03:43

## 2021-01-01 RX ADMIN — HEPARIN SODIUM 5000 UNITS: 5000 INJECTION INTRAVENOUS; SUBCUTANEOUS at 16:49

## 2021-01-01 RX ADMIN — INSULIN LISPRO 6 UNITS: 100 INJECTION, SOLUTION INTRAVENOUS; SUBCUTANEOUS at 17:46

## 2021-01-01 RX ADMIN — SODIUM CHLORIDE 10 ML: 9 INJECTION, SOLUTION INTRAMUSCULAR; INTRAVENOUS; SUBCUTANEOUS at 22:13

## 2021-01-01 RX ADMIN — IPRATROPIUM BROMIDE AND ALBUTEROL SULFATE 3 ML: .5; 3 SOLUTION RESPIRATORY (INHALATION) at 11:01

## 2021-01-01 RX ADMIN — FUROSEMIDE 80 MG: 10 INJECTION, SOLUTION INTRAMUSCULAR; INTRAVENOUS at 08:22

## 2021-01-01 RX ADMIN — HEPARIN SODIUM 5000 UNITS: 5000 INJECTION INTRAVENOUS; SUBCUTANEOUS at 20:51

## 2021-01-01 RX ADMIN — ALBUTEROL SULFATE 2.5 MG: 2.5 SOLUTION RESPIRATORY (INHALATION) at 04:44

## 2021-01-01 RX ADMIN — SODIUM CHLORIDE 75 ML/HR: 900 INJECTION, SOLUTION INTRAVENOUS at 00:39

## 2021-01-01 RX ADMIN — SPIRONOLACTONE 12.5 MG: 25 TABLET ORAL at 08:34

## 2021-01-01 RX ADMIN — METOPROLOL TARTRATE 25 MG: 25 TABLET, FILM COATED ORAL at 21:46

## 2021-01-01 RX ADMIN — ALUMINUM HYDROXIDE, MAGNESIUM HYDROXIDE, DIMETHICONE 5 ML: 200; 200; 20 LIQUID ORAL at 11:43

## 2021-01-01 RX ADMIN — DEXMEDETOMIDINE HYDROCHLORIDE 1 MCG/KG/HR: 100 INJECTION, SOLUTION INTRAVENOUS at 20:30

## 2021-01-01 RX ADMIN — LEVETIRACETAM 500 MG: 100 INJECTION, SOLUTION INTRAVENOUS at 23:38

## 2021-01-01 RX ADMIN — NOREPINEPHRINE BITARTRATE 8 MCG/MIN: 1 INJECTION, SOLUTION, CONCENTRATE INTRAVENOUS at 19:33

## 2021-01-01 RX ADMIN — Medication 10 ML: at 05:46

## 2021-01-01 RX ADMIN — PANTOPRAZOLE SODIUM 40 MG: 40 INJECTION, POWDER, FOR SOLUTION INTRAVENOUS at 09:04

## 2021-01-01 RX ADMIN — HUMAN INSULIN 9 UNITS: 100 INJECTION, SOLUTION SUBCUTANEOUS at 12:03

## 2021-01-01 RX ADMIN — LORAZEPAM 2 MG: 2 INJECTION INTRAMUSCULAR; INTRAVENOUS at 07:49

## 2021-01-01 RX ADMIN — MODAFINIL 100 MG: 100 TABLET ORAL at 11:39

## 2021-01-01 RX ADMIN — FENTANYL CITRATE 50 MCG: 0.05 INJECTION, SOLUTION INTRAMUSCULAR; INTRAVENOUS at 04:53

## 2021-01-01 RX ADMIN — HUMAN INSULIN 6 UNITS: 100 INJECTION, SOLUTION SUBCUTANEOUS at 11:06

## 2021-01-01 RX ADMIN — FENTANYL CITRATE 50 MCG: 50 INJECTION INTRAMUSCULAR; INTRAVENOUS at 23:30

## 2021-01-01 RX ADMIN — INSULIN LISPRO 3 UNITS: 100 INJECTION, SOLUTION INTRAVENOUS; SUBCUTANEOUS at 16:23

## 2021-01-01 RX ADMIN — OXYCODONE 10 MG: 5 TABLET ORAL at 18:21

## 2021-01-01 RX ADMIN — HUMAN INSULIN 3 UNITS: 100 INJECTION, SOLUTION SUBCUTANEOUS at 00:17

## 2021-01-01 RX ADMIN — ALBUTEROL SULFATE 2.5 MG: 2.5 SOLUTION RESPIRATORY (INHALATION) at 08:18

## 2021-01-01 RX ADMIN — GABAPENTIN 100 MG: 100 CAPSULE ORAL at 08:38

## 2021-01-01 RX ADMIN — HEPARIN SODIUM 5000 UNITS: 5000 INJECTION INTRAVENOUS; SUBCUTANEOUS at 13:27

## 2021-01-01 RX ADMIN — FUROSEMIDE 80 MG: 10 INJECTION, SOLUTION INTRAMUSCULAR; INTRAVENOUS at 08:09

## 2021-01-01 RX ADMIN — OXYCODONE HYDROCHLORIDE AND ACETAMINOPHEN 500 MG: 500 TABLET ORAL at 21:16

## 2021-01-01 RX ADMIN — VITAMIN D, TAB 1000IU (100/BT) 5 TABLET: 25 TAB at 08:16

## 2021-01-01 RX ADMIN — DEXAMETHASONE SODIUM PHOSPHATE 2 MG: 10 INJECTION INTRAMUSCULAR; INTRAVENOUS at 10:28

## 2021-01-01 RX ADMIN — NOREPINEPHRINE-DEXTROSE IV SOLUTION 4 MG/250ML-5% 10 MCG/MIN: 4-5/250 SOLUTION at 03:28

## 2021-01-01 RX ADMIN — ALBUTEROL SULFATE 2.5 MG: 2.5 SOLUTION RESPIRATORY (INHALATION) at 02:24

## 2021-01-01 RX ADMIN — HUMAN INSULIN 6 UNITS: 100 INJECTION, SOLUTION SUBCUTANEOUS at 04:00

## 2021-01-01 RX ADMIN — HYDRALAZINE HYDROCHLORIDE 10 MG: 10 TABLET, FILM COATED ORAL at 08:27

## 2021-01-01 RX ADMIN — FUROSEMIDE 80 MG: 10 INJECTION, SOLUTION INTRAMUSCULAR; INTRAVENOUS at 09:00

## 2021-01-01 RX ADMIN — Medication 5 ML: at 21:57

## 2021-01-01 RX ADMIN — ALBUTEROL SULFATE 2.5 MG: 2.5 SOLUTION RESPIRATORY (INHALATION) at 19:30

## 2021-01-01 RX ADMIN — INSULIN GLARGINE 20 UNITS: 100 INJECTION, SOLUTION SUBCUTANEOUS at 09:31

## 2021-01-01 RX ADMIN — ASPIRIN 81 MG: 81 TABLET ORAL at 08:34

## 2021-01-01 RX ADMIN — ALBUTEROL SULFATE 2.5 MG: 2.5 SOLUTION RESPIRATORY (INHALATION) at 13:46

## 2021-01-01 RX ADMIN — HEPARIN SODIUM 5000 UNITS: 5000 INJECTION INTRAVENOUS; SUBCUTANEOUS at 00:02

## 2021-01-01 RX ADMIN — Medication 1 EACH: at 21:13

## 2021-01-01 RX ADMIN — HYDRALAZINE HYDROCHLORIDE 10 MG: 10 TABLET, FILM COATED ORAL at 21:52

## 2021-01-01 RX ADMIN — DEXMEDETOMIDINE HYDROCHLORIDE 0.7 MCG/KG/HR: 100 INJECTION, SOLUTION INTRAVENOUS at 04:21

## 2021-01-01 RX ADMIN — ASPIRIN 81 MG: 81 TABLET ORAL at 08:29

## 2021-01-01 RX ADMIN — OXYCODONE 15 MG: 5 TABLET ORAL at 23:00

## 2021-01-01 RX ADMIN — FENTANYL CITRATE 200 MCG/HR: 0.05 INJECTION, SOLUTION INTRAMUSCULAR; INTRAVENOUS at 14:40

## 2021-01-01 RX ADMIN — HEPARIN SODIUM 5000 UNITS: 5000 INJECTION INTRAVENOUS; SUBCUTANEOUS at 00:06

## 2021-01-01 RX ADMIN — FAMOTIDINE 20 MG: 20 TABLET, FILM COATED ORAL at 13:02

## 2021-01-01 RX ADMIN — FENTANYL CITRATE 50 MCG: 0.05 INJECTION, SOLUTION INTRAMUSCULAR; INTRAVENOUS at 18:49

## 2021-01-01 RX ADMIN — Medication 1 AMPULE: at 08:08

## 2021-01-01 RX ADMIN — Medication 10 ML: at 21:46

## 2021-01-01 RX ADMIN — HEPARIN SODIUM 5000 UNITS: 5000 INJECTION INTRAVENOUS; SUBCUTANEOUS at 00:38

## 2021-01-01 RX ADMIN — Medication 1 AMPULE: at 21:22

## 2021-01-01 RX ADMIN — FUROSEMIDE 40 MG: 40 TABLET ORAL at 10:44

## 2021-01-01 RX ADMIN — ROPINIROLE HYDROCHLORIDE 0.5 MG: 0.5 TABLET, FILM COATED ORAL at 15:23

## 2021-01-01 RX ADMIN — LORAZEPAM 2 MG: 2 INJECTION INTRAMUSCULAR; INTRAVENOUS at 02:40

## 2021-01-01 RX ADMIN — IPRATROPIUM BROMIDE AND ALBUTEROL SULFATE 3 ML: .5; 3 SOLUTION RESPIRATORY (INHALATION) at 15:49

## 2021-01-01 RX ADMIN — ALBUTEROL SULFATE 2.5 MG: 2.5 SOLUTION RESPIRATORY (INHALATION) at 20:25

## 2021-01-01 RX ADMIN — HUMAN INSULIN 3 UNITS: 100 INJECTION, SOLUTION SUBCUTANEOUS at 18:00

## 2021-01-01 RX ADMIN — FAMOTIDINE 20 MG: 20 TABLET, FILM COATED ORAL at 08:17

## 2021-01-01 RX ADMIN — ACETAMINOPHEN 650 MG: 325 TABLET, FILM COATED ORAL at 17:51

## 2021-01-01 RX ADMIN — HYDRALAZINE HYDROCHLORIDE 10 MG: 10 TABLET, FILM COATED ORAL at 09:25

## 2021-01-01 RX ADMIN — ALBUTEROL SULFATE 2.5 MG: 2.5 SOLUTION RESPIRATORY (INHALATION) at 14:16

## 2021-01-01 RX ADMIN — SODIUM CHLORIDE 10 ML: 9 INJECTION, SOLUTION INTRAMUSCULAR; INTRAVENOUS; SUBCUTANEOUS at 14:50

## 2021-01-01 RX ADMIN — ENOXAPARIN SODIUM 30 MG: 30 INJECTION SUBCUTANEOUS at 08:40

## 2021-01-01 RX ADMIN — SODIUM CHLORIDE 75 ML/HR: 900 INJECTION, SOLUTION INTRAVENOUS at 16:43

## 2021-01-01 RX ADMIN — INSULIN GLARGINE 8 UNITS: 100 INJECTION, SOLUTION SUBCUTANEOUS at 21:32

## 2021-01-01 RX ADMIN — RDII 250 MG CAPSULE 250 MG: at 18:30

## 2021-01-01 RX ADMIN — HYDRALAZINE HYDROCHLORIDE 50 MG: 50 TABLET, FILM COATED ORAL at 00:35

## 2021-01-01 RX ADMIN — ALBUTEROL SULFATE 2.5 MG: 2.5 SOLUTION RESPIRATORY (INHALATION) at 13:09

## 2021-01-01 RX ADMIN — RISPERIDONE 0.5 MG: 1 SOLUTION ORAL at 18:34

## 2021-01-01 RX ADMIN — IPRATROPIUM BROMIDE AND ALBUTEROL SULFATE 3 ML: .5; 3 SOLUTION RESPIRATORY (INHALATION) at 07:12

## 2021-01-01 RX ADMIN — HUMAN INSULIN 3 UNITS: 100 INJECTION, SOLUTION SUBCUTANEOUS at 16:21

## 2021-01-01 RX ADMIN — RISPERIDONE 0.5 MG: 1 SOLUTION ORAL at 18:00

## 2021-01-01 RX ADMIN — HEPARIN SODIUM 5000 UNITS: 5000 INJECTION INTRAVENOUS; SUBCUTANEOUS at 05:40

## 2021-01-01 RX ADMIN — FENTANYL CITRATE 50 MCG: 50 INJECTION INTRAMUSCULAR; INTRAVENOUS at 23:12

## 2021-01-01 RX ADMIN — ALUMINUM HYDROXIDE, MAGNESIUM HYDROXIDE, DIMETHICONE 5 ML: 200; 200; 20 LIQUID ORAL at 17:19

## 2021-01-01 RX ADMIN — DOXYCYCLINE 100 MG: 100 INJECTION, POWDER, LYOPHILIZED, FOR SOLUTION INTRAVENOUS at 21:42

## 2021-01-01 RX ADMIN — DEXMEDETOMIDINE HYDROCHLORIDE 1.5 MCG/KG/HR: 100 INJECTION, SOLUTION INTRAVENOUS at 22:30

## 2021-01-01 RX ADMIN — FUROSEMIDE 40 MG: 10 INJECTION, SOLUTION INTRAMUSCULAR; INTRAVENOUS at 13:37

## 2021-01-01 RX ADMIN — METHADONE HYDROCHLORIDE 10 MG: 5 TABLET ORAL at 08:48

## 2021-01-01 RX ADMIN — Medication 5 ML: at 05:29

## 2021-01-01 RX ADMIN — LORAZEPAM 0.5 MG: 2 INJECTION INTRAMUSCULAR; INTRAVENOUS at 23:28

## 2021-01-01 RX ADMIN — DEXTROSE MONOHYDRATE 12.5 G: 500 INJECTION PARENTERAL at 13:06

## 2021-01-01 RX ADMIN — SODIUM CHLORIDE 40 ML: 9 INJECTION, SOLUTION INTRAMUSCULAR; INTRAVENOUS; SUBCUTANEOUS at 05:29

## 2021-01-01 RX ADMIN — HEPARIN SODIUM 5000 UNITS: 5000 INJECTION INTRAVENOUS; SUBCUTANEOUS at 08:27

## 2021-01-01 RX ADMIN — INSULIN LISPRO 9 UNITS: 100 INJECTION, SOLUTION INTRAVENOUS; SUBCUTANEOUS at 11:31

## 2021-01-01 RX ADMIN — LORAZEPAM 2 MG: 2 INJECTION INTRAMUSCULAR; INTRAVENOUS at 10:30

## 2021-01-01 RX ADMIN — INSULIN LISPRO 3 UNITS: 100 INJECTION, SOLUTION INTRAVENOUS; SUBCUTANEOUS at 22:49

## 2021-01-01 RX ADMIN — Medication 1000 MG: at 17:59

## 2021-01-01 RX ADMIN — CLOPIDOGREL BISULFATE 75 MG: 75 TABLET ORAL at 08:41

## 2021-01-01 RX ADMIN — ALBUTEROL SULFATE 2.5 MG: 2.5 SOLUTION RESPIRATORY (INHALATION) at 14:34

## 2021-01-01 RX ADMIN — Medication 1 AMPULE: at 21:26

## 2021-01-01 RX ADMIN — DOPAMINE HYDROCHLORIDE 5 MCG/KG/MIN: 320 INJECTION, SOLUTION INTRAVENOUS at 16:53

## 2021-01-01 RX ADMIN — METOPROLOL TARTRATE 25 MG: 25 TABLET, FILM COATED ORAL at 08:14

## 2021-01-01 RX ADMIN — HYDROMORPHONE HYDROCHLORIDE 2 MG: 1 INJECTION, SOLUTION INTRAMUSCULAR; INTRAVENOUS; SUBCUTANEOUS at 10:56

## 2021-01-01 RX ADMIN — ALBUTEROL SULFATE 2.5 MG: 2.5 SOLUTION RESPIRATORY (INHALATION) at 03:32

## 2021-01-01 RX ADMIN — Medication 1 AMPULE: at 09:34

## 2021-01-01 RX ADMIN — CEFTRIAXONE 1 G: 1 INJECTION, POWDER, FOR SOLUTION INTRAMUSCULAR; INTRAVENOUS at 15:07

## 2021-01-01 RX ADMIN — INSULIN GLARGINE 40 UNITS: 100 INJECTION, SOLUTION SUBCUTANEOUS at 22:49

## 2021-01-01 RX ADMIN — Medication 1 AMPULE: at 21:14

## 2021-01-01 RX ADMIN — LEVETIRACETAM 500 MG: 100 SOLUTION ORAL at 11:12

## 2021-01-01 RX ADMIN — FENTANYL CITRATE 50 MCG: 0.05 INJECTION, SOLUTION INTRAMUSCULAR; INTRAVENOUS at 07:19

## 2021-01-01 RX ADMIN — DEXMEDETOMIDINE HYDROCHLORIDE 0.8 MCG/KG/HR: 100 INJECTION, SOLUTION INTRAVENOUS at 15:44

## 2021-01-01 RX ADMIN — HUMAN INSULIN 3 UNITS: 100 INJECTION, SOLUTION SUBCUTANEOUS at 11:44

## 2021-01-01 RX ADMIN — CEFTRIAXONE 2 G: 2 INJECTION, POWDER, FOR SOLUTION INTRAMUSCULAR; INTRAVENOUS at 19:36

## 2021-01-01 RX ADMIN — INSULIN GLARGINE 20 UNITS: 100 INJECTION, SOLUTION SUBCUTANEOUS at 08:58

## 2021-01-01 RX ADMIN — HUMAN INSULIN 3 UNITS: 100 INJECTION, SOLUTION SUBCUTANEOUS at 11:10

## 2021-01-01 RX ADMIN — Medication 20 ML: at 21:28

## 2021-01-01 RX ADMIN — ROPINIROLE HYDROCHLORIDE 0.5 MG: 0.5 TABLET, FILM COATED ORAL at 17:03

## 2021-01-01 RX ADMIN — Medication 5 ML: at 05:08

## 2021-01-01 RX ADMIN — INSULIN GLARGINE 3 UNITS: 100 INJECTION, SOLUTION SUBCUTANEOUS at 09:27

## 2021-01-01 RX ADMIN — FAMOTIDINE 20 MG: 10 INJECTION INTRAVENOUS at 08:54

## 2021-01-01 RX ADMIN — HUMAN INSULIN 6 UNITS: 100 INJECTION, SOLUTION SUBCUTANEOUS at 17:13

## 2021-01-01 RX ADMIN — HEPARIN SODIUM 5000 UNITS: 5000 INJECTION INTRAVENOUS; SUBCUTANEOUS at 08:31

## 2021-01-01 RX ADMIN — INSULIN LISPRO 3 UNITS: 100 INJECTION, SOLUTION INTRAVENOUS; SUBCUTANEOUS at 16:05

## 2021-01-01 RX ADMIN — HUMAN INSULIN 3 UNITS: 100 INJECTION, SOLUTION SUBCUTANEOUS at 23:06

## 2021-01-01 RX ADMIN — PIPERACILLIN SODIUM AND TAZOBACTAM SODIUM 4.5 G: 4; .5 INJECTION, POWDER, LYOPHILIZED, FOR SOLUTION INTRAVENOUS at 11:48

## 2021-01-01 RX ADMIN — DEXMEDETOMIDINE HYDROCHLORIDE 1.5 MCG/KG/HR: 100 INJECTION, SOLUTION INTRAVENOUS at 09:00

## 2021-01-01 RX ADMIN — PANTOPRAZOLE SODIUM 40 MG: 40 TABLET, DELAYED RELEASE ORAL at 06:03

## 2021-01-01 RX ADMIN — LORAZEPAM 1 MG: 2 INJECTION INTRAMUSCULAR; INTRAVENOUS at 12:28

## 2021-01-01 RX ADMIN — HEPARIN SODIUM 5000 UNITS: 5000 INJECTION INTRAVENOUS; SUBCUTANEOUS at 16:22

## 2021-01-01 RX ADMIN — RISPERIDONE 1 MG: 1 TABLET ORAL at 22:06

## 2021-01-01 RX ADMIN — ALBUTEROL SULFATE 2.5 MG: 2.5 SOLUTION RESPIRATORY (INHALATION) at 04:40

## 2021-01-01 RX ADMIN — FAMOTIDINE 20 MG: 40 POWDER, FOR SUSPENSION ORAL at 10:08

## 2021-01-01 RX ADMIN — Medication 1 AMPULE: at 08:20

## 2021-01-01 RX ADMIN — SODIUM CHLORIDE 10 ML: 9 INJECTION, SOLUTION INTRAMUSCULAR; INTRAVENOUS; SUBCUTANEOUS at 13:25

## 2021-01-01 RX ADMIN — ALBUTEROL SULFATE 2.5 MG: 2.5 SOLUTION RESPIRATORY (INHALATION) at 09:06

## 2021-01-01 RX ADMIN — CEFTRIAXONE SODIUM 2 G: 2 INJECTION, POWDER, FOR SOLUTION INTRAMUSCULAR; INTRAVENOUS at 09:32

## 2021-01-01 RX ADMIN — SODIUM CHLORIDE 30 ML: 9 INJECTION, SOLUTION INTRAMUSCULAR; INTRAVENOUS; SUBCUTANEOUS at 06:00

## 2021-01-01 RX ADMIN — CEFEPIME HYDROCHLORIDE 1 G: 1 INJECTION, POWDER, FOR SOLUTION INTRAMUSCULAR; INTRAVENOUS at 21:20

## 2021-01-01 RX ADMIN — MIDAZOLAM 8 MG/HR: 5 INJECTION, SOLUTION INTRAMUSCULAR; INTRAVENOUS at 02:24

## 2021-01-01 RX ADMIN — PANTOPRAZOLE SODIUM 40 MG: 40 TABLET, DELAYED RELEASE ORAL at 06:35

## 2021-01-01 RX ADMIN — LOSARTAN POTASSIUM 100 MG: 50 TABLET, FILM COATED ORAL at 11:43

## 2021-01-01 RX ADMIN — ALBUTEROL SULFATE 2.5 MG: 2.5 SOLUTION RESPIRATORY (INHALATION) at 19:49

## 2021-01-01 RX ADMIN — HEPARIN SODIUM 5000 UNITS: 5000 INJECTION INTRAVENOUS; SUBCUTANEOUS at 00:43

## 2021-01-01 RX ADMIN — SODIUM CHLORIDE 10 ML: 9 INJECTION, SOLUTION INTRAMUSCULAR; INTRAVENOUS; SUBCUTANEOUS at 13:00

## 2021-01-01 RX ADMIN — BUDESONIDE 500 MCG: 0.5 INHALANT RESPIRATORY (INHALATION) at 19:29

## 2021-01-01 RX ADMIN — ALBUTEROL SULFATE 2.5 MG: 2.5 SOLUTION RESPIRATORY (INHALATION) at 19:10

## 2021-01-01 RX ADMIN — INSULIN LISPRO 2 UNITS: 100 INJECTION, SOLUTION INTRAVENOUS; SUBCUTANEOUS at 17:03

## 2021-01-01 RX ADMIN — HEPARIN SODIUM 5000 UNITS: 5000 INJECTION INTRAVENOUS; SUBCUTANEOUS at 00:21

## 2021-01-01 RX ADMIN — FENTANYL CITRATE 50 MCG: 50 INJECTION INTRAMUSCULAR; INTRAVENOUS at 10:03

## 2021-01-01 RX ADMIN — HUMAN INSULIN 3 UNITS: 100 INJECTION, SOLUTION SUBCUTANEOUS at 08:43

## 2021-01-01 RX ADMIN — DEXTROSE MONOHYDRATE 25 G: 25 INJECTION, SOLUTION INTRAVENOUS at 20:01

## 2021-01-01 RX ADMIN — RDII 250 MG CAPSULE 250 MG: at 17:28

## 2021-01-01 RX ADMIN — DEXMEDETOMIDINE HYDROCHLORIDE 1 MCG/KG/HR: 100 INJECTION, SOLUTION INTRAVENOUS at 06:45

## 2021-01-01 RX ADMIN — Medication 1 AMPULE: at 21:37

## 2021-01-01 RX ADMIN — SODIUM CHLORIDE 125 ML/HR: 9 INJECTION, SOLUTION INTRAVENOUS at 12:27

## 2021-01-01 RX ADMIN — LINEZOLID 600 MG: 600 INJECTION, SOLUTION INTRAVENOUS at 08:20

## 2021-01-01 RX ADMIN — PROPOFOL 35 MCG/KG/MIN: 10 INJECTION, EMULSION INTRAVENOUS at 02:15

## 2021-01-01 RX ADMIN — ROPINIROLE HYDROCHLORIDE 0.5 MG: 0.5 TABLET, FILM COATED ORAL at 08:33

## 2021-01-01 RX ADMIN — SODIUM ZIRCONIUM CYCLOSILICATE 10 G: 10 POWDER, FOR SUSPENSION ORAL at 16:15

## 2021-01-01 RX ADMIN — FUROSEMIDE 40 MG: 40 TABLET ORAL at 11:43

## 2021-01-01 RX ADMIN — ALBUTEROL SULFATE 2.5 MG: 2.5 SOLUTION RESPIRATORY (INHALATION) at 20:34

## 2021-01-01 RX ADMIN — IPRATROPIUM BROMIDE AND ALBUTEROL SULFATE 3 ML: .5; 3 SOLUTION RESPIRATORY (INHALATION) at 11:35

## 2021-01-01 RX ADMIN — SODIUM CHLORIDE 10 ML: 9 INJECTION, SOLUTION INTRAMUSCULAR; INTRAVENOUS; SUBCUTANEOUS at 21:08

## 2021-01-01 RX ADMIN — SODIUM CHLORIDE 5 ML: 9 INJECTION, SOLUTION INTRAMUSCULAR; INTRAVENOUS; SUBCUTANEOUS at 22:00

## 2021-01-01 RX ADMIN — GUANFACINE 1 MG: 1 TABLET ORAL at 08:22

## 2021-01-01 RX ADMIN — DEXMEDETOMIDINE HYDROCHLORIDE 1 MCG/KG/HR: 100 INJECTION, SOLUTION INTRAVENOUS at 02:26

## 2021-01-01 RX ADMIN — GABAPENTIN 100 MG: 100 CAPSULE ORAL at 17:07

## 2021-01-01 RX ADMIN — ALBUTEROL SULFATE 2.5 MG: 2.5 SOLUTION RESPIRATORY (INHALATION) at 20:05

## 2021-01-01 RX ADMIN — CEFTRIAXONE 1 G: 1 INJECTION, POWDER, FOR SOLUTION INTRAMUSCULAR; INTRAVENOUS at 09:40

## 2021-01-01 RX ADMIN — DEXMEDETOMIDINE HYDROCHLORIDE 0.8 MCG/KG/HR: 100 INJECTION, SOLUTION INTRAVENOUS at 05:28

## 2021-01-01 RX ADMIN — LORAZEPAM 2 MG: 2 INJECTION INTRAMUSCULAR; INTRAVENOUS at 01:53

## 2021-01-01 RX ADMIN — ALBUTEROL SULFATE 2.5 MG: 2.5 SOLUTION RESPIRATORY (INHALATION) at 14:59

## 2021-01-01 RX ADMIN — HUMAN INSULIN 3 UNITS: 100 INJECTION, SOLUTION SUBCUTANEOUS at 05:48

## 2021-01-01 RX ADMIN — DEXMEDETOMIDINE HYDROCHLORIDE 1.5 MCG/KG/HR: 100 INJECTION, SOLUTION INTRAVENOUS at 09:02

## 2021-01-01 RX ADMIN — DEXMEDETOMIDINE HYDROCHLORIDE 1.4 MCG/KG/HR: 100 INJECTION, SOLUTION INTRAVENOUS at 17:53

## 2021-01-01 RX ADMIN — Medication 1 AMPULE: at 09:55

## 2021-01-01 RX ADMIN — LORAZEPAM 0.5 MG: 2 INJECTION INTRAMUSCULAR; INTRAVENOUS at 10:55

## 2021-01-01 RX ADMIN — SODIUM CHLORIDE 10 ML: 9 INJECTION, SOLUTION INTRAMUSCULAR; INTRAVENOUS; SUBCUTANEOUS at 13:07

## 2021-01-01 RX ADMIN — Medication 1000 MG: at 09:31

## 2021-01-01 RX ADMIN — Medication 1.33 MCG/KG/HR: at 04:00

## 2021-01-01 RX ADMIN — LORAZEPAM 2 MG: 2 INJECTION INTRAMUSCULAR; INTRAVENOUS at 22:06

## 2021-01-01 RX ADMIN — DEXMEDETOMIDINE HYDROCHLORIDE 1 MCG/KG/HR: 100 INJECTION, SOLUTION INTRAVENOUS at 22:22

## 2021-01-01 RX ADMIN — SENNOSIDES AND DOCUSATE SODIUM 1 TABLET: 8.6; 5 TABLET ORAL at 21:42

## 2021-01-01 RX ADMIN — INSULIN GLARGINE 25 UNITS: 100 INJECTION, SOLUTION SUBCUTANEOUS at 08:39

## 2021-01-01 RX ADMIN — MORPHINE SULFATE 4 MG: 2 INJECTION, SOLUTION INTRAMUSCULAR; INTRAVENOUS at 12:48

## 2021-01-01 RX ADMIN — HUMAN INSULIN 12 UNITS: 100 INJECTION, SOLUTION SUBCUTANEOUS at 12:01

## 2021-01-01 RX ADMIN — HEPARIN SODIUM 5000 UNITS: 5000 INJECTION INTRAVENOUS; SUBCUTANEOUS at 21:48

## 2021-01-01 RX ADMIN — ALBUTEROL SULFATE 2.5 MG: 2.5 SOLUTION RESPIRATORY (INHALATION) at 01:46

## 2021-01-01 RX ADMIN — INSULIN LISPRO 2 UNITS: 100 INJECTION, SOLUTION INTRAVENOUS; SUBCUTANEOUS at 08:49

## 2021-01-01 RX ADMIN — CLOPIDOGREL BISULFATE 75 MG: 75 TABLET ORAL at 08:47

## 2021-01-01 RX ADMIN — Medication 1 AMPULE: at 09:48

## 2021-01-01 RX ADMIN — CLOPIDOGREL BISULFATE 75 MG: 75 TABLET ORAL at 08:29

## 2021-01-01 RX ADMIN — ALBUTEROL SULFATE 2.5 MG: 2.5 SOLUTION RESPIRATORY (INHALATION) at 12:53

## 2021-01-01 RX ADMIN — ALUMINUM HYDROXIDE, MAGNESIUM HYDROXIDE, DIMETHICONE 5 ML: 200; 200; 20 LIQUID ORAL at 17:42

## 2021-01-01 RX ADMIN — DEXMEDETOMIDINE HYDROCHLORIDE 0.8 MCG/KG/HR: 100 INJECTION, SOLUTION INTRAVENOUS at 17:57

## 2021-01-01 RX ADMIN — ASPIRIN 81 MG: 81 TABLET ORAL at 09:24

## 2021-01-01 RX ADMIN — GUANFACINE 1 MG: 1 TABLET ORAL at 09:18

## 2021-01-01 RX ADMIN — RDII 250 MG CAPSULE 250 MG: at 17:30

## 2021-01-01 RX ADMIN — DEXAMETHASONE SODIUM PHOSPHATE 6 MG: 4 INJECTION, SOLUTION INTRAMUSCULAR; INTRAVENOUS at 08:16

## 2021-01-01 RX ADMIN — Medication 1 AMPULE: at 21:48

## 2021-01-01 RX ADMIN — HUMAN INSULIN 3 UNITS: 100 INJECTION, SOLUTION SUBCUTANEOUS at 03:59

## 2021-01-01 RX ADMIN — ROPINIROLE HYDROCHLORIDE 0.5 MG: 0.5 TABLET, FILM COATED ORAL at 21:46

## 2021-01-01 RX ADMIN — RDII 250 MG CAPSULE 250 MG: at 09:35

## 2021-01-01 RX ADMIN — INSULIN LISPRO 3 UNITS: 100 INJECTION, SOLUTION INTRAVENOUS; SUBCUTANEOUS at 12:08

## 2021-01-01 RX ADMIN — SODIUM CHLORIDE 40 ML: 9 INJECTION, SOLUTION INTRAMUSCULAR; INTRAVENOUS; SUBCUTANEOUS at 13:35

## 2021-01-01 RX ADMIN — SODIUM ZIRCONIUM CYCLOSILICATE 10 G: 10 POWDER, FOR SUSPENSION ORAL at 16:38

## 2021-01-01 RX ADMIN — VITAMIN D, TAB 1000IU (100/BT) 5 TABLET: 25 TAB at 08:56

## 2021-01-01 RX ADMIN — Medication 1 AMPULE: at 20:15

## 2021-01-01 RX ADMIN — SENNOSIDES AND DOCUSATE SODIUM 2 TABLET: 8.6; 5 TABLET ORAL at 08:16

## 2021-01-01 RX ADMIN — DEXTROSE MONOHYDRATE 12.5 G: 500 INJECTION PARENTERAL at 09:09

## 2021-01-01 RX ADMIN — LORAZEPAM 2 MG: 2 INJECTION INTRAMUSCULAR; INTRAVENOUS at 15:15

## 2021-01-01 RX ADMIN — SODIUM CHLORIDE 10 ML: 9 INJECTION, SOLUTION INTRAMUSCULAR; INTRAVENOUS; SUBCUTANEOUS at 13:35

## 2021-01-01 RX ADMIN — OXYCODONE HYDROCHLORIDE AND ACETAMINOPHEN 500 MG: 500 TABLET ORAL at 22:15

## 2021-01-01 RX ADMIN — FENTANYL CITRATE 200 MCG/HR: 0.05 INJECTION, SOLUTION INTRAMUSCULAR; INTRAVENOUS at 18:51

## 2021-01-01 RX ADMIN — HEPARIN SODIUM 3000 UNITS: 1000 INJECTION INTRAVENOUS; SUBCUTANEOUS at 09:11

## 2021-01-01 RX ADMIN — INSULIN GLARGINE 20 UNITS: 100 INJECTION, SOLUTION SUBCUTANEOUS at 08:50

## 2021-01-01 RX ADMIN — ALBUTEROL SULFATE 2.5 MG: 2.5 SOLUTION RESPIRATORY (INHALATION) at 20:07

## 2021-01-01 RX ADMIN — HYDROMORPHONE HYDROCHLORIDE 2 MG: 1 INJECTION, SOLUTION INTRAMUSCULAR; INTRAVENOUS; SUBCUTANEOUS at 04:25

## 2021-01-01 RX ADMIN — ALBUTEROL SULFATE 2.5 MG: 2.5 SOLUTION RESPIRATORY (INHALATION) at 21:52

## 2021-01-01 RX ADMIN — NOREPINEPHRINE BITARTRATE 4 MCG/MIN: 1 INJECTION, SOLUTION, CONCENTRATE INTRAVENOUS at 10:45

## 2021-01-01 RX ADMIN — LINEZOLID 600 MG: 600 INJECTION, SOLUTION INTRAVENOUS at 21:09

## 2021-01-01 RX ADMIN — SODIUM ZIRCONIUM CYCLOSILICATE 5 G: 5 POWDER, FOR SUSPENSION ORAL at 08:15

## 2021-01-01 RX ADMIN — Medication 10 ML: at 14:12

## 2021-01-01 RX ADMIN — ALBUTEROL SULFATE 2.5 MG: 2.5 SOLUTION RESPIRATORY (INHALATION) at 14:23

## 2021-01-01 RX ADMIN — ALBUTEROL SULFATE 2.5 MG: 2.5 SOLUTION RESPIRATORY (INHALATION) at 15:36

## 2021-01-01 RX ADMIN — INSULIN LISPRO 9 UNITS: 100 INJECTION, SOLUTION INTRAVENOUS; SUBCUTANEOUS at 17:29

## 2021-01-01 RX ADMIN — Medication 125 MCG/HR: at 02:07

## 2021-01-01 RX ADMIN — SODIUM CHLORIDE SOLN NEBU 3% 4 ML: 3 NEBU SOLN at 08:24

## 2021-01-01 RX ADMIN — LORAZEPAM 2 MG: 2 INJECTION INTRAMUSCULAR; INTRAVENOUS at 21:26

## 2021-01-01 RX ADMIN — INSULIN LISPRO 6 UNITS: 100 INJECTION, SOLUTION INTRAVENOUS; SUBCUTANEOUS at 16:34

## 2021-01-01 RX ADMIN — Medication 1 AMPULE: at 08:09

## 2021-01-01 RX ADMIN — RISPERIDONE 0.5 MG: 1 SOLUTION ORAL at 18:50

## 2021-01-01 RX ADMIN — DEXMEDETOMIDINE HYDROCHLORIDE 1.3 MCG/KG/HR: 100 INJECTION, SOLUTION INTRAVENOUS at 13:02

## 2021-01-01 RX ADMIN — MIDAZOLAM 2 MG/HR: 5 INJECTION, SOLUTION INTRAMUSCULAR; INTRAVENOUS at 07:45

## 2021-01-01 RX ADMIN — SODIUM CHLORIDE SOLN NEBU 3% 4 ML: 3 NEBU SOLN at 20:28

## 2021-01-01 RX ADMIN — INSULIN GLARGINE 12 UNITS: 100 INJECTION, SOLUTION SUBCUTANEOUS at 09:22

## 2021-01-01 RX ADMIN — Medication 1 AMPULE: at 08:29

## 2021-01-01 RX ADMIN — ALBUTEROL SULFATE 2.5 MG: 2.5 SOLUTION RESPIRATORY (INHALATION) at 14:05

## 2021-01-01 RX ADMIN — VANCOMYCIN HYDROCHLORIDE 750 MG: 750 INJECTION, POWDER, LYOPHILIZED, FOR SOLUTION INTRAVENOUS at 10:59

## 2021-01-01 RX ADMIN — INSULIN LISPRO 3 UNITS: 100 INJECTION, SOLUTION INTRAVENOUS; SUBCUTANEOUS at 11:59

## 2021-01-01 RX ADMIN — Medication 1 AMPULE: at 20:57

## 2021-01-01 RX ADMIN — HUMAN INSULIN 12 UNITS: 100 INJECTION, SOLUTION SUBCUTANEOUS at 21:54

## 2021-01-01 RX ADMIN — CEFTRIAXONE 2 G: 2 INJECTION, POWDER, FOR SOLUTION INTRAMUSCULAR; INTRAVENOUS at 20:08

## 2021-01-01 RX ADMIN — HUMAN INSULIN 9 UNITS: 100 INJECTION, SOLUTION SUBCUTANEOUS at 16:29

## 2021-01-01 RX ADMIN — LORAZEPAM 2 MG: 2 INJECTION INTRAMUSCULAR; INTRAVENOUS at 04:24

## 2021-01-01 RX ADMIN — FUROSEMIDE 20 MG: 10 INJECTION, SOLUTION INTRAMUSCULAR; INTRAVENOUS at 21:34

## 2021-01-01 RX ADMIN — HUMAN INSULIN 3 UNITS: 100 INJECTION, SOLUTION SUBCUTANEOUS at 00:11

## 2021-01-01 RX ADMIN — SODIUM CHLORIDE 30 ML: 9 INJECTION, SOLUTION INTRAMUSCULAR; INTRAVENOUS; SUBCUTANEOUS at 13:24

## 2021-01-01 RX ADMIN — INSULIN HUMAN 2 UNITS: 100 INJECTION, SOLUTION PARENTERAL at 06:16

## 2021-01-01 RX ADMIN — MODAFINIL 100 MG: 100 TABLET ORAL at 08:17

## 2021-01-01 RX ADMIN — SODIUM CHLORIDE 20 ML: 9 INJECTION, SOLUTION INTRAMUSCULAR; INTRAVENOUS; SUBCUTANEOUS at 05:27

## 2021-01-01 RX ADMIN — Medication 100 MCG/HR: at 12:19

## 2021-01-01 RX ADMIN — FUROSEMIDE 40 MG: 40 TABLET ORAL at 09:34

## 2021-01-01 RX ADMIN — FENTANYL CITRATE 50 MCG: 0.05 INJECTION, SOLUTION INTRAMUSCULAR; INTRAVENOUS at 22:00

## 2021-01-01 RX ADMIN — LEVETIRACETAM 500 MG: 100 INJECTION, SOLUTION INTRAVENOUS at 12:47

## 2021-01-01 RX ADMIN — OXYCODONE HYDROCHLORIDE AND ACETAMINOPHEN 500 MG: 500 TABLET ORAL at 09:04

## 2021-01-01 RX ADMIN — PANTOPRAZOLE SODIUM 40 MG: 40 INJECTION, POWDER, FOR SOLUTION INTRAVENOUS at 08:51

## 2021-01-01 RX ADMIN — ALBUTEROL SULFATE 2.5 MG: 2.5 SOLUTION RESPIRATORY (INHALATION) at 20:10

## 2021-01-01 RX ADMIN — ALBUTEROL SULFATE 2.5 MG: 2.5 SOLUTION RESPIRATORY (INHALATION) at 13:15

## 2021-01-01 RX ADMIN — HUMAN INSULIN 4 UNITS: 100 INJECTION, SOLUTION SUBCUTANEOUS at 05:52

## 2021-01-01 RX ADMIN — HUMAN INSULIN 9 UNITS: 100 INJECTION, SOLUTION SUBCUTANEOUS at 08:21

## 2021-01-01 RX ADMIN — HUMAN INSULIN 4 UNITS: 100 INJECTION, SOLUTION SUBCUTANEOUS at 17:37

## 2021-01-01 RX ADMIN — NOREPINEPHRINE-DEXTROSE IV SOLUTION 4 MG/250ML-5% 4 MCG/MIN: 4-5/250 SOLUTION at 09:23

## 2021-01-01 RX ADMIN — DEXTROSE MONOHYDRATE 25 G: 25 INJECTION, SOLUTION INTRAVENOUS at 23:39

## 2021-01-01 RX ADMIN — SENNOSIDES AND DOCUSATE SODIUM 2 TABLET: 8.6; 5 TABLET ORAL at 12:21

## 2021-01-01 RX ADMIN — DEXMEDETOMIDINE HYDROCHLORIDE 1 MCG/KG/HR: 100 INJECTION, SOLUTION, CONCENTRATE INTRAVENOUS at 16:51

## 2021-01-01 RX ADMIN — DEXMEDETOMIDINE HYDROCHLORIDE 0.9 MCG/KG/HR: 100 INJECTION, SOLUTION INTRAVENOUS at 10:00

## 2021-01-01 RX ADMIN — HYDRALAZINE HYDROCHLORIDE 50 MG: 50 TABLET, FILM COATED ORAL at 13:50

## 2021-01-01 RX ADMIN — INSULIN LISPRO 2 UNITS: 100 INJECTION, SOLUTION INTRAVENOUS; SUBCUTANEOUS at 16:55

## 2021-01-01 RX ADMIN — LORAZEPAM 1 MG: 2 INJECTION INTRAMUSCULAR; INTRAVENOUS at 13:01

## 2021-01-01 RX ADMIN — PANTOPRAZOLE SODIUM 40 MG: 40 INJECTION, POWDER, FOR SOLUTION INTRAVENOUS at 09:09

## 2021-01-01 RX ADMIN — Medication 1 AMPULE: at 08:12

## 2021-01-01 RX ADMIN — Medication 10 ML: at 15:24

## 2021-01-01 RX ADMIN — Medication 10 ML: at 05:51

## 2021-01-01 RX ADMIN — FENTANYL CITRATE 50 MCG: 0.05 INJECTION, SOLUTION INTRAMUSCULAR; INTRAVENOUS at 21:21

## 2021-01-01 RX ADMIN — RISPERIDONE 1 MG: 1 SOLUTION ORAL at 22:11

## 2021-01-01 RX ADMIN — SODIUM CHLORIDE 10 ML: 9 INJECTION, SOLUTION INTRAMUSCULAR; INTRAVENOUS; SUBCUTANEOUS at 21:38

## 2021-01-01 RX ADMIN — ASPIRIN 81 MG: 81 TABLET ORAL at 08:54

## 2021-01-01 RX ADMIN — SODIUM CHLORIDE 10 ML: 9 INJECTION, SOLUTION INTRAMUSCULAR; INTRAVENOUS; SUBCUTANEOUS at 05:56

## 2021-01-01 RX ADMIN — OXYCODONE HYDROCHLORIDE 5 MG: 5 TABLET ORAL at 21:30

## 2021-01-01 RX ADMIN — HEPARIN SODIUM 5000 UNITS: 5000 INJECTION INTRAVENOUS; SUBCUTANEOUS at 12:12

## 2021-01-01 RX ADMIN — CEFEPIME HYDROCHLORIDE 1 G: 1 INJECTION, POWDER, FOR SOLUTION INTRAMUSCULAR; INTRAVENOUS at 21:13

## 2021-01-01 RX ADMIN — HEPARIN SODIUM 5000 UNITS: 5000 INJECTION INTRAVENOUS; SUBCUTANEOUS at 08:47

## 2021-01-01 RX ADMIN — SODIUM CHLORIDE 40 ML: 9 INJECTION, SOLUTION INTRAMUSCULAR; INTRAVENOUS; SUBCUTANEOUS at 05:10

## 2021-01-01 RX ADMIN — Medication 10 ML: at 12:12

## 2021-01-01 RX ADMIN — Medication 1000 MG: at 08:56

## 2021-01-01 RX ADMIN — Medication 100 MCG/HR: at 07:17

## 2021-01-01 RX ADMIN — RISPERIDONE 1 MG: 1 SOLUTION ORAL at 10:00

## 2021-01-01 RX ADMIN — DEXMEDETOMIDINE HYDROCHLORIDE 1 MCG/KG/HR: 100 INJECTION, SOLUTION INTRAVENOUS at 21:30

## 2021-01-01 RX ADMIN — HUMAN INSULIN 9 UNITS: 100 INJECTION, SOLUTION SUBCUTANEOUS at 17:45

## 2021-01-01 RX ADMIN — ALBUTEROL SULFATE 2.5 MG: 2.5 SOLUTION RESPIRATORY (INHALATION) at 07:47

## 2021-01-01 RX ADMIN — SODIUM CHLORIDE 10 ML: 9 INJECTION, SOLUTION INTRAMUSCULAR; INTRAVENOUS; SUBCUTANEOUS at 15:06

## 2021-01-01 RX ADMIN — ALBUTEROL SULFATE 2.5 MG: 2.5 SOLUTION RESPIRATORY (INHALATION) at 03:20

## 2021-01-01 RX ADMIN — IPRATROPIUM BROMIDE AND ALBUTEROL SULFATE 3 ML: .5; 3 SOLUTION RESPIRATORY (INHALATION) at 19:29

## 2021-01-01 RX ADMIN — HEPARIN SODIUM 5000 UNITS: 5000 INJECTION INTRAVENOUS; SUBCUTANEOUS at 16:28

## 2021-01-01 RX ADMIN — HUMAN INSULIN 9 UNITS: 100 INJECTION, SOLUTION SUBCUTANEOUS at 04:44

## 2021-01-01 RX ADMIN — DEXTROSE MONOHYDRATE 25 G: 500 INJECTION PARENTERAL at 09:08

## 2021-01-01 RX ADMIN — LEVETIRACETAM 500 MG: 100 INJECTION, SOLUTION INTRAVENOUS at 12:06

## 2021-01-01 RX ADMIN — GABAPENTIN 100 MG: 100 CAPSULE ORAL at 21:45

## 2021-01-01 RX ADMIN — LORAZEPAM 2 MG: 2 INJECTION INTRAMUSCULAR; INTRAVENOUS at 19:51

## 2021-01-01 RX ADMIN — DEXMEDETOMIDINE HYDROCHLORIDE 0.8 MCG/KG/HR: 100 INJECTION, SOLUTION INTRAVENOUS at 21:45

## 2021-01-01 RX ADMIN — Medication 10 ML: at 05:43

## 2021-01-01 RX ADMIN — INSULIN GLARGINE 20 UNITS: 100 INJECTION, SOLUTION SUBCUTANEOUS at 09:15

## 2021-01-01 RX ADMIN — Medication 1 AMPULE: at 08:24

## 2021-01-01 RX ADMIN — PROPOFOL 160 MCG/KG/MIN: 10 INJECTION, EMULSION INTRAVENOUS at 08:41

## 2021-01-01 RX ADMIN — LOPERAMIDE HCL 2 MG: 1 SOLUTION ORAL at 17:29

## 2021-01-01 RX ADMIN — RISPERIDONE 1 MG: 1 SOLUTION ORAL at 08:54

## 2021-01-01 RX ADMIN — SODIUM CHLORIDE 10 ML: 9 INJECTION, SOLUTION INTRAMUSCULAR; INTRAVENOUS; SUBCUTANEOUS at 21:07

## 2021-01-01 RX ADMIN — FENTANYL CITRATE 50 MCG: 0.05 INJECTION, SOLUTION INTRAMUSCULAR; INTRAVENOUS at 22:01

## 2021-01-01 RX ADMIN — SODIUM CHLORIDE 10 ML: 9 INJECTION, SOLUTION INTRAMUSCULAR; INTRAVENOUS; SUBCUTANEOUS at 14:04

## 2021-01-01 RX ADMIN — DEXMEDETOMIDINE HYDROCHLORIDE 1.5 MCG/KG/HR: 100 INJECTION, SOLUTION INTRAVENOUS at 13:41

## 2021-01-01 RX ADMIN — METHYLPREDNISOLONE SODIUM SUCCINATE 40 MG: 40 INJECTION, POWDER, FOR SOLUTION INTRAMUSCULAR; INTRAVENOUS at 11:11

## 2021-01-01 RX ADMIN — OXYCODONE 15 MG: 5 TABLET ORAL at 06:15

## 2021-01-01 RX ADMIN — ASPIRIN 81 MG: 81 TABLET ORAL at 09:27

## 2021-01-01 RX ADMIN — DOXYCYCLINE 100 MG: 100 INJECTION, POWDER, LYOPHILIZED, FOR SOLUTION INTRAVENOUS at 21:03

## 2021-01-01 RX ADMIN — HUMAN INSULIN 4 UNITS: 100 INJECTION, SOLUTION SUBCUTANEOUS at 00:12

## 2021-01-01 RX ADMIN — Medication 175 MCG/HR: at 08:18

## 2021-01-01 RX ADMIN — HUMAN INSULIN 4 UNITS: 100 INJECTION, SOLUTION SUBCUTANEOUS at 17:14

## 2021-01-01 RX ADMIN — Medication 1 AMPULE: at 21:17

## 2021-01-01 RX ADMIN — HUMAN INSULIN 6 UNITS: 100 INJECTION, SOLUTION SUBCUTANEOUS at 11:27

## 2021-01-01 RX ADMIN — ALBUTEROL SULFATE 2.5 MG: 2.5 SOLUTION RESPIRATORY (INHALATION) at 07:05

## 2021-01-01 RX ADMIN — FUROSEMIDE 40 MG: 10 INJECTION, SOLUTION INTRAMUSCULAR; INTRAVENOUS at 08:14

## 2021-01-01 RX ADMIN — HUMAN INSULIN 3 UNITS: 100 INJECTION, SOLUTION SUBCUTANEOUS at 08:46

## 2021-01-01 RX ADMIN — LORAZEPAM 2 MG: 2 INJECTION INTRAMUSCULAR; INTRAVENOUS at 04:16

## 2021-01-01 RX ADMIN — Medication 200 MCG/HR: at 19:28

## 2021-01-01 RX ADMIN — FENTANYL CITRATE 50 MCG: 0.05 INJECTION, SOLUTION INTRAMUSCULAR; INTRAVENOUS at 16:53

## 2021-01-01 RX ADMIN — METOPROLOL SUCCINATE 12.5 MG: 25 TABLET, EXTENDED RELEASE ORAL at 07:59

## 2021-01-01 RX ADMIN — PIPERACILLIN SODIUM AND TAZOBACTAM SODIUM 3.38 G: 3; .375 INJECTION, POWDER, LYOPHILIZED, FOR SOLUTION INTRAVENOUS at 16:33

## 2021-01-01 RX ADMIN — SODIUM CHLORIDE 10 ML: 9 INJECTION, SOLUTION INTRAMUSCULAR; INTRAVENOUS; SUBCUTANEOUS at 21:01

## 2021-01-01 RX ADMIN — Medication 1 AMPULE: at 22:20

## 2021-01-01 RX ADMIN — Medication 175 MCG/HR: at 10:52

## 2021-01-01 RX ADMIN — HYDROMORPHONE HYDROCHLORIDE 2 MG: 1 INJECTION, SOLUTION INTRAMUSCULAR; INTRAVENOUS; SUBCUTANEOUS at 18:20

## 2021-01-01 RX ADMIN — ROSUVASTATIN 20 MG: 10 TABLET, FILM COATED ORAL at 21:03

## 2021-01-01 RX ADMIN — SODIUM CHLORIDE 40 MG: 9 INJECTION, SOLUTION INTRAMUSCULAR; INTRAVENOUS; SUBCUTANEOUS at 08:27

## 2021-01-01 RX ADMIN — Medication 10 ML: at 06:34

## 2021-01-01 RX ADMIN — SODIUM CHLORIDE 10 ML: 9 INJECTION, SOLUTION INTRAMUSCULAR; INTRAVENOUS; SUBCUTANEOUS at 13:37

## 2021-01-01 RX ADMIN — HYDRALAZINE HYDROCHLORIDE 10 MG: 20 INJECTION, SOLUTION INTRAMUSCULAR; INTRAVENOUS at 15:01

## 2021-01-01 RX ADMIN — ALBUTEROL SULFATE 2.5 MG: 2.5 SOLUTION RESPIRATORY (INHALATION) at 13:54

## 2021-01-01 RX ADMIN — RISPERIDONE 0.5 MG: 1 SOLUTION ORAL at 16:53

## 2021-01-01 RX ADMIN — Medication 220 MG: at 09:31

## 2021-01-01 RX ADMIN — FAMOTIDINE 20 MG: 20 TABLET, FILM COATED ORAL at 08:41

## 2021-01-01 RX ADMIN — FUROSEMIDE 20 MG: 10 INJECTION, SOLUTION INTRAMUSCULAR; INTRAVENOUS at 20:04

## 2021-01-01 RX ADMIN — ALBUTEROL SULFATE 2.5 MG: 2.5 SOLUTION RESPIRATORY (INHALATION) at 07:52

## 2021-01-01 RX ADMIN — Medication 1 AMPULE: at 08:46

## 2021-01-01 RX ADMIN — ALBUTEROL SULFATE 2.5 MG: 2.5 SOLUTION RESPIRATORY (INHALATION) at 07:35

## 2021-01-01 RX ADMIN — MEROPENEM 500 MG: 500 INJECTION, POWDER, FOR SOLUTION INTRAVENOUS at 23:20

## 2021-01-01 RX ADMIN — HUMAN INSULIN 6 UNITS: 100 INJECTION, SOLUTION SUBCUTANEOUS at 12:18

## 2021-01-01 RX ADMIN — MODAFINIL 100 MG: 100 TABLET ORAL at 09:25

## 2021-01-01 RX ADMIN — ALBUTEROL SULFATE 2.5 MG: 2.5 SOLUTION RESPIRATORY (INHALATION) at 07:59

## 2021-01-01 RX ADMIN — Medication 175 MCG/HR: at 15:47

## 2021-01-01 RX ADMIN — Medication 1 AMPULE: at 08:10

## 2021-01-01 RX ADMIN — PANTOPRAZOLE SODIUM 40 MG: 40 TABLET, DELAYED RELEASE ORAL at 07:30

## 2021-01-01 RX ADMIN — INSULIN HUMAN 2 UNITS: 100 INJECTION, SOLUTION PARENTERAL at 17:33

## 2021-01-01 RX ADMIN — FAMOTIDINE 20 MG: 20 TABLET, FILM COATED ORAL at 09:00

## 2021-01-01 RX ADMIN — MEROPENEM 500 MG: 500 INJECTION, POWDER, FOR SOLUTION INTRAVENOUS at 00:33

## 2021-01-01 RX ADMIN — FUROSEMIDE 40 MG: 10 INJECTION, SOLUTION INTRAMUSCULAR; INTRAVENOUS at 01:50

## 2021-01-01 RX ADMIN — SODIUM CHLORIDE SOLN NEBU 3% 4 ML: 3 NEBU SOLN at 07:59

## 2021-01-01 RX ADMIN — MIDAZOLAM 3 MG/HR: 5 INJECTION, SOLUTION INTRAMUSCULAR; INTRAVENOUS at 11:07

## 2021-01-01 RX ADMIN — SODIUM CHLORIDE 10 ML: 9 INJECTION, SOLUTION INTRAMUSCULAR; INTRAVENOUS; SUBCUTANEOUS at 21:16

## 2021-01-01 RX ADMIN — PANTOPRAZOLE SODIUM 40 MG: 40 INJECTION, POWDER, FOR SOLUTION INTRAVENOUS at 09:15

## 2021-01-01 RX ADMIN — SODIUM CHLORIDE 10 ML: 9 INJECTION, SOLUTION INTRAMUSCULAR; INTRAVENOUS; SUBCUTANEOUS at 08:20

## 2021-01-01 RX ADMIN — FENTANYL CITRATE 50 MCG: 50 INJECTION INTRAMUSCULAR; INTRAVENOUS at 10:30

## 2021-01-01 RX ADMIN — IPRATROPIUM BROMIDE AND ALBUTEROL SULFATE 3 ML: .5; 3 SOLUTION RESPIRATORY (INHALATION) at 23:07

## 2021-01-01 RX ADMIN — HUMAN INSULIN 3 UNITS: 100 INJECTION, SOLUTION SUBCUTANEOUS at 03:52

## 2021-01-01 RX ADMIN — ROSUVASTATIN 20 MG: 20 TABLET, FILM COATED ORAL at 22:00

## 2021-01-01 RX ADMIN — FENTANYL CITRATE 50 MCG: 0.05 INJECTION, SOLUTION INTRAMUSCULAR; INTRAVENOUS at 02:55

## 2021-01-01 RX ADMIN — RDII 250 MG CAPSULE 250 MG: at 09:15

## 2021-01-01 RX ADMIN — INSULIN LISPRO 9 UNITS: 100 INJECTION, SOLUTION INTRAVENOUS; SUBCUTANEOUS at 06:12

## 2021-01-01 RX ADMIN — HEPARIN SODIUM 5000 UNITS: 5000 INJECTION INTRAVENOUS; SUBCUTANEOUS at 08:26

## 2021-01-01 RX ADMIN — Medication 1 AMPULE: at 21:58

## 2021-01-01 RX ADMIN — HEPARIN SODIUM 5000 UNITS: 5000 INJECTION INTRAVENOUS; SUBCUTANEOUS at 16:30

## 2021-01-01 RX ADMIN — ALBUTEROL SULFATE 2.5 MG: 2.5 SOLUTION RESPIRATORY (INHALATION) at 20:23

## 2021-01-01 RX ADMIN — PANTOPRAZOLE SODIUM 40 MG: 40 TABLET, DELAYED RELEASE ORAL at 06:19

## 2021-01-01 RX ADMIN — SODIUM CHLORIDE 50 ML/HR: 900 INJECTION, SOLUTION INTRAVENOUS at 15:26

## 2021-01-01 RX ADMIN — HEPARIN SODIUM 5000 UNITS: 5000 INJECTION INTRAVENOUS; SUBCUTANEOUS at 00:23

## 2021-01-01 RX ADMIN — METOPROLOL TARTRATE 25 MG: 25 TABLET, FILM COATED ORAL at 21:11

## 2021-01-01 RX ADMIN — LINEZOLID 600 MG: 600 TABLET, FILM COATED ORAL at 09:27

## 2021-01-01 RX ADMIN — HEPARIN SODIUM 5000 UNITS: 5000 INJECTION INTRAVENOUS; SUBCUTANEOUS at 00:48

## 2021-01-01 RX ADMIN — HUMAN INSULIN 9 UNITS: 100 INJECTION, SOLUTION SUBCUTANEOUS at 12:37

## 2021-01-01 RX ADMIN — HUMAN INSULIN 6 UNITS: 100 INJECTION, SOLUTION SUBCUTANEOUS at 23:38

## 2021-01-01 RX ADMIN — HEPARIN SODIUM 5000 UNITS: 5000 INJECTION INTRAVENOUS; SUBCUTANEOUS at 08:34

## 2021-01-01 RX ADMIN — POLYETHYLENE GLYCOL 3350 17 G: 17 POWDER, FOR SOLUTION ORAL at 08:18

## 2021-01-01 RX ADMIN — MIDAZOLAM 10 MG/HR: 5 INJECTION, SOLUTION INTRAMUSCULAR; INTRAVENOUS at 23:37

## 2021-01-01 RX ADMIN — INSULIN GLARGINE 8 UNITS: 100 INJECTION, SOLUTION SUBCUTANEOUS at 22:00

## 2021-01-01 RX ADMIN — SODIUM CHLORIDE 10 ML: 9 INJECTION, SOLUTION INTRAMUSCULAR; INTRAVENOUS; SUBCUTANEOUS at 13:22

## 2021-01-01 RX ADMIN — HYDRALAZINE HYDROCHLORIDE 20 MG: 20 INJECTION INTRAMUSCULAR; INTRAVENOUS at 18:46

## 2021-01-01 RX ADMIN — DEXMEDETOMIDINE HYDROCHLORIDE 1.2 MCG/KG/HR: 100 INJECTION, SOLUTION INTRAVENOUS at 20:20

## 2021-01-01 RX ADMIN — DEXMEDETOMIDINE HYDROCHLORIDE 1.2 MCG/KG/HR: 100 INJECTION, SOLUTION INTRAVENOUS at 00:29

## 2021-01-01 RX ADMIN — LIDOCAINE HYDROCHLORIDE 20 MG: 20 INJECTION, SOLUTION EPIDURAL; INFILTRATION; INTRACAUDAL; PERINEURAL at 11:13

## 2021-01-01 RX ADMIN — DEXMEDETOMIDINE HYDROCHLORIDE 1.1 MCG/KG/HR: 100 INJECTION, SOLUTION, CONCENTRATE INTRAVENOUS at 12:52

## 2021-01-01 RX ADMIN — ALBUTEROL SULFATE 2.5 MG: 2.5 SOLUTION RESPIRATORY (INHALATION) at 19:35

## 2021-01-01 RX ADMIN — RISPERIDONE 1 MG: 1 TABLET ORAL at 08:41

## 2021-01-01 RX ADMIN — SODIUM CHLORIDE 10 ML: 9 INJECTION, SOLUTION INTRAMUSCULAR; INTRAVENOUS; SUBCUTANEOUS at 22:52

## 2021-01-01 RX ADMIN — FUROSEMIDE 40 MG: 40 TABLET ORAL at 08:18

## 2021-01-01 RX ADMIN — RISPERIDONE 0.5 MG: 1 SOLUTION ORAL at 20:26

## 2021-01-01 RX ADMIN — FUROSEMIDE 40 MG: 10 INJECTION, SOLUTION INTRAMUSCULAR; INTRAVENOUS at 17:24

## 2021-01-01 RX ADMIN — FAMOTIDINE 20 MG: 10 INJECTION INTRAVENOUS at 08:27

## 2021-01-01 RX ADMIN — INSULIN LISPRO 6 UNITS: 100 INJECTION, SOLUTION INTRAVENOUS; SUBCUTANEOUS at 00:00

## 2021-01-01 RX ADMIN — ASPIRIN 81 MG: 81 TABLET ORAL at 08:18

## 2021-01-01 RX ADMIN — VANCOMYCIN HYDROCHLORIDE 1000 MG: 1 INJECTION, POWDER, LYOPHILIZED, FOR SOLUTION INTRAVENOUS at 05:00

## 2021-01-01 RX ADMIN — FUROSEMIDE 40 MG: 10 INJECTION, SOLUTION INTRAMUSCULAR; INTRAVENOUS at 10:16

## 2021-01-01 RX ADMIN — MIDAZOLAM 6 MG/HR: 5 INJECTION, SOLUTION INTRAMUSCULAR; INTRAVENOUS at 18:25

## 2021-01-01 RX ADMIN — METOPROLOL TARTRATE 12.5 MG: 25 TABLET, FILM COATED ORAL at 17:25

## 2021-01-01 RX ADMIN — HYDROMORPHONE HYDROCHLORIDE 2 MG: 1 INJECTION, SOLUTION INTRAMUSCULAR; INTRAVENOUS; SUBCUTANEOUS at 13:13

## 2021-01-01 RX ADMIN — DEXTROSE MONOHYDRATE 5 MCG/MIN: 50 INJECTION, SOLUTION INTRAVENOUS at 04:17

## 2021-01-01 RX ADMIN — HEPARIN SODIUM 5000 UNITS: 5000 INJECTION INTRAVENOUS; SUBCUTANEOUS at 23:06

## 2021-01-01 RX ADMIN — PANTOPRAZOLE SODIUM 40 MG: 40 INJECTION, POWDER, FOR SOLUTION INTRAVENOUS at 09:45

## 2021-01-01 RX ADMIN — FENTANYL CITRATE 50 MCG/HR: 50 INJECTION, SOLUTION INTRAMUSCULAR; INTRAVENOUS at 04:30

## 2021-01-01 RX ADMIN — FUROSEMIDE 40 MG: 10 INJECTION, SOLUTION INTRAMUSCULAR; INTRAVENOUS at 08:30

## 2021-01-01 RX ADMIN — OXYCODONE 15 MG: 5 TABLET ORAL at 12:14

## 2021-01-01 RX ADMIN — INSULIN LISPRO 2 UNITS: 100 INJECTION, SOLUTION INTRAVENOUS; SUBCUTANEOUS at 21:56

## 2021-01-01 RX ADMIN — Medication 1 AMPULE: at 09:24

## 2021-01-01 RX ADMIN — HUMAN INSULIN 9 UNITS: 100 INJECTION, SOLUTION SUBCUTANEOUS at 22:00

## 2021-01-01 RX ADMIN — Medication 40 ML: at 03:57

## 2021-01-01 RX ADMIN — LOPERAMIDE HCL 2 MG: 1 SOLUTION ORAL at 05:43

## 2021-01-01 RX ADMIN — DEXMEDETOMIDINE HYDROCHLORIDE 1.4 MCG/KG/HR: 100 INJECTION, SOLUTION INTRAVENOUS at 21:35

## 2021-01-01 RX ADMIN — MIRTAZAPINE 15 MG: 15 TABLET, FILM COATED ORAL at 01:00

## 2021-01-01 RX ADMIN — FUROSEMIDE 40 MG: 10 INJECTION, SOLUTION INTRAMUSCULAR; INTRAVENOUS at 09:24

## 2021-01-01 RX ADMIN — RISPERIDONE 0.5 MG: 1 TABLET, FILM COATED ORAL at 17:01

## 2021-01-01 RX ADMIN — FENTANYL CITRATE 200 MCG/HR: 0.05 INJECTION, SOLUTION INTRAMUSCULAR; INTRAVENOUS at 01:55

## 2021-01-01 RX ADMIN — SODIUM CHLORIDE SOLN NEBU 3% 4 ML: 3 NEBU SOLN at 22:53

## 2021-01-01 RX ADMIN — Medication 1 AMPULE: at 09:20

## 2021-01-01 RX ADMIN — MODAFINIL 100 MG: 100 TABLET ORAL at 08:30

## 2021-01-01 RX ADMIN — SODIUM CHLORIDE SOLN NEBU 3% 4 ML: 3 NEBU SOLN at 19:40

## 2021-01-01 RX ADMIN — FUROSEMIDE 80 MG: 10 INJECTION, SOLUTION INTRAMUSCULAR; INTRAVENOUS at 08:24

## 2021-01-01 RX ADMIN — LOPERAMIDE HCL 2 MG: 1 SOLUTION ORAL at 17:45

## 2021-01-01 RX ADMIN — ROPINIROLE HYDROCHLORIDE 0.5 MG: 0.5 TABLET, FILM COATED ORAL at 22:08

## 2021-01-01 RX ADMIN — INSULIN GLARGINE 20 UNITS: 100 INJECTION, SOLUTION SUBCUTANEOUS at 13:43

## 2021-01-01 RX ADMIN — Medication 150 MCG/HR: at 04:36

## 2021-01-01 RX ADMIN — HEPARIN SODIUM 5000 UNITS: 5000 INJECTION INTRAVENOUS; SUBCUTANEOUS at 17:06

## 2021-01-01 RX ADMIN — DEXMEDETOMIDINE HYDROCHLORIDE 1.4 MCG/KG/HR: 100 INJECTION, SOLUTION INTRAVENOUS at 05:28

## 2021-01-01 RX ADMIN — ALBUTEROL SULFATE 2.5 MG: 2.5 SOLUTION RESPIRATORY (INHALATION) at 01:23

## 2021-01-01 RX ADMIN — LOSARTAN POTASSIUM 100 MG: 50 TABLET, FILM COATED ORAL at 08:41

## 2021-01-01 RX ADMIN — CLOPIDOGREL BISULFATE 75 MG: 75 TABLET ORAL at 08:40

## 2021-01-01 RX ADMIN — LORAZEPAM 2 MG: 2 INJECTION INTRAMUSCULAR; INTRAVENOUS at 10:48

## 2021-01-01 RX ADMIN — HEPARIN SODIUM 5000 UNITS: 5000 INJECTION INTRAVENOUS; SUBCUTANEOUS at 16:17

## 2021-01-01 RX ADMIN — PIPERACILLIN SODIUM AND TAZOBACTAM SODIUM 3.38 G: 3; .375 INJECTION, POWDER, LYOPHILIZED, FOR SOLUTION INTRAVENOUS at 05:19

## 2021-01-01 RX ADMIN — MEROPENEM 500 MG: 500 INJECTION, POWDER, FOR SOLUTION INTRAVENOUS at 16:16

## 2021-01-01 RX ADMIN — MODAFINIL 100 MG: 100 TABLET ORAL at 08:22

## 2021-01-01 RX ADMIN — Medication 1 AMPULE: at 10:58

## 2021-01-01 RX ADMIN — Medication 1000 MG: at 17:36

## 2021-01-01 RX ADMIN — HUMAN INSULIN 4 UNITS: 100 INJECTION, SOLUTION SUBCUTANEOUS at 00:00

## 2021-01-01 RX ADMIN — Medication 10 ML: at 14:06

## 2021-01-01 RX ADMIN — MORPHINE SULFATE 2 MG: 2 INJECTION, SOLUTION INTRAMUSCULAR; INTRAVENOUS at 21:17

## 2021-01-01 RX ADMIN — ROSUVASTATIN 20 MG: 20 TABLET, FILM COATED ORAL at 21:46

## 2021-01-01 RX ADMIN — SODIUM ZIRCONIUM CYCLOSILICATE 15 G: 10 POWDER, FOR SUSPENSION ORAL at 09:45

## 2021-01-01 RX ADMIN — Medication 100 MCG/HR: at 19:20

## 2021-01-01 RX ADMIN — HEPARIN SODIUM 5000 UNITS: 5000 INJECTION INTRAVENOUS; SUBCUTANEOUS at 15:32

## 2021-01-01 RX ADMIN — CEFTRIAXONE SODIUM 2 G: 2 INJECTION, POWDER, FOR SOLUTION INTRAMUSCULAR; INTRAVENOUS at 10:09

## 2021-01-01 RX ADMIN — OXYCODONE 10 MG: 5 TABLET ORAL at 17:35

## 2021-01-01 RX ADMIN — HYDRALAZINE HYDROCHLORIDE 10 MG: 10 TABLET, FILM COATED ORAL at 21:02

## 2021-01-01 RX ADMIN — HYDRALAZINE HYDROCHLORIDE 10 MG: 10 TABLET, FILM COATED ORAL at 16:13

## 2021-01-01 RX ADMIN — HYDRALAZINE HYDROCHLORIDE 10 MG: 10 TABLET, FILM COATED ORAL at 16:12

## 2021-01-01 RX ADMIN — FAMOTIDINE 20 MG: 10 INJECTION INTRAVENOUS at 08:03

## 2021-01-01 RX ADMIN — SODIUM CHLORIDE 10 ML: 9 INJECTION, SOLUTION INTRAMUSCULAR; INTRAVENOUS; SUBCUTANEOUS at 13:55

## 2021-01-01 RX ADMIN — METOPROLOL TARTRATE 12.5 MG: 25 TABLET, FILM COATED ORAL at 08:35

## 2021-01-01 RX ADMIN — SODIUM CHLORIDE 10 ML: 9 INJECTION, SOLUTION INTRAMUSCULAR; INTRAVENOUS; SUBCUTANEOUS at 21:32

## 2021-01-01 RX ADMIN — VANCOMYCIN HYDROCHLORIDE 2000 MG: 10 INJECTION, POWDER, LYOPHILIZED, FOR SOLUTION INTRAVENOUS at 13:33

## 2021-01-01 RX ADMIN — HUMAN INSULIN 9 UNITS: 100 INJECTION, SOLUTION SUBCUTANEOUS at 21:45

## 2021-01-01 RX ADMIN — Medication 1 AMPULE: at 08:16

## 2021-01-01 RX ADMIN — DEXMEDETOMIDINE HYDROCHLORIDE 0.2 MCG/KG/HR: 100 INJECTION, SOLUTION INTRAVENOUS at 06:39

## 2021-01-01 RX ADMIN — HUMAN INSULIN 9 UNITS: 100 INJECTION, SOLUTION SUBCUTANEOUS at 11:30

## 2021-01-01 RX ADMIN — FENTANYL CITRATE 50 MCG: 0.05 INJECTION, SOLUTION INTRAMUSCULAR; INTRAVENOUS at 03:01

## 2021-01-01 RX ADMIN — RISPERIDONE 0.5 MG: 1 SOLUTION ORAL at 21:00

## 2021-01-01 RX ADMIN — INSULIN GLARGINE 8 UNITS: 100 INJECTION, SOLUTION SUBCUTANEOUS at 21:54

## 2021-01-01 RX ADMIN — SENNOSIDES AND DOCUSATE SODIUM 1 TABLET: 8.6; 5 TABLET ORAL at 08:18

## 2021-01-01 RX ADMIN — Medication 10 ML: at 06:14

## 2021-01-01 RX ADMIN — PHENYLEPHRINE HYDROCHLORIDE 200 MCG: 10 INJECTION INTRAVENOUS at 08:44

## 2021-01-01 RX ADMIN — IPRATROPIUM BROMIDE AND ALBUTEROL SULFATE 3 ML: .5; 3 SOLUTION RESPIRATORY (INHALATION) at 07:27

## 2021-01-01 RX ADMIN — FUROSEMIDE 40 MG: 10 INJECTION, SOLUTION INTRAMUSCULAR; INTRAVENOUS at 16:53

## 2021-01-01 RX ADMIN — INSULIN LISPRO 6 UNITS: 100 INJECTION, SOLUTION INTRAVENOUS; SUBCUTANEOUS at 21:14

## 2021-01-01 RX ADMIN — FAMOTIDINE 20 MG: 20 TABLET, FILM COATED ORAL at 08:07

## 2021-01-01 RX ADMIN — DEXMEDETOMIDINE HYDROCHLORIDE 1.2 MCG/KG/HR: 100 INJECTION, SOLUTION INTRAVENOUS at 10:15

## 2021-01-01 RX ADMIN — OXYCODONE 15 MG: 5 TABLET ORAL at 05:43

## 2021-01-01 RX ADMIN — FUROSEMIDE 80 MG: 10 INJECTION, SOLUTION INTRAMUSCULAR; INTRAVENOUS at 20:20

## 2021-01-01 RX ADMIN — HYDRALAZINE HYDROCHLORIDE 10 MG: 20 INJECTION INTRAMUSCULAR; INTRAVENOUS at 05:42

## 2021-01-01 RX ADMIN — INSULIN LISPRO 4 UNITS: 100 INJECTION, SOLUTION INTRAVENOUS; SUBCUTANEOUS at 17:08

## 2021-01-01 RX ADMIN — OXYCODONE HYDROCHLORIDE 15 MG: 5 TABLET ORAL at 11:38

## 2021-01-01 RX ADMIN — FUROSEMIDE 40 MG: 10 INJECTION, SOLUTION INTRAMUSCULAR; INTRAVENOUS at 22:04

## 2021-01-01 RX ADMIN — HYDRALAZINE HYDROCHLORIDE 10 MG: 10 TABLET, FILM COATED ORAL at 16:06

## 2021-01-01 RX ADMIN — ALBUTEROL SULFATE 2.5 MG: 2.5 SOLUTION RESPIRATORY (INHALATION) at 13:50

## 2021-01-01 RX ADMIN — Medication 10 ML: at 21:51

## 2021-01-01 RX ADMIN — DEXMEDETOMIDINE HYDROCHLORIDE 1.5 MCG/KG/HR: 100 INJECTION, SOLUTION INTRAVENOUS at 01:01

## 2021-01-01 RX ADMIN — INSULIN HUMAN 4 UNITS: 100 INJECTION, SOLUTION PARENTERAL at 11:12

## 2021-01-01 RX ADMIN — FUROSEMIDE 40 MG: 10 INJECTION, SOLUTION INTRAMUSCULAR; INTRAVENOUS at 09:41

## 2021-01-01 RX ADMIN — IPRATROPIUM BROMIDE AND ALBUTEROL SULFATE 3 ML: .5; 3 SOLUTION RESPIRATORY (INHALATION) at 23:31

## 2021-01-01 RX ADMIN — METOPROLOL TARTRATE 25 MG: 25 TABLET, FILM COATED ORAL at 20:49

## 2021-01-01 RX ADMIN — Medication 1 EACH: at 14:11

## 2021-01-01 RX ADMIN — Medication 20 ML: at 05:37

## 2021-01-01 RX ADMIN — METOPROLOL SUCCINATE 12.5 MG: 25 TABLET, EXTENDED RELEASE ORAL at 10:52

## 2021-01-01 RX ADMIN — HYDRALAZINE HYDROCHLORIDE 50 MG: 50 TABLET, FILM COATED ORAL at 17:33

## 2021-01-01 RX ADMIN — DOXYCYCLINE 100 MG: 100 INJECTION, POWDER, LYOPHILIZED, FOR SOLUTION INTRAVENOUS at 08:30

## 2021-01-01 RX ADMIN — MODAFINIL 100 MG: 100 TABLET ORAL at 08:43

## 2021-01-01 RX ADMIN — HEPARIN SODIUM 5000 UNITS: 5000 INJECTION INTRAVENOUS; SUBCUTANEOUS at 06:15

## 2021-01-01 RX ADMIN — ALBUTEROL SULFATE 2.5 MG: 2.5 SOLUTION RESPIRATORY (INHALATION) at 02:07

## 2021-01-01 RX ADMIN — SODIUM CHLORIDE 22 ML: 9 INJECTION, SOLUTION INTRAMUSCULAR; INTRAVENOUS; SUBCUTANEOUS at 06:00

## 2021-01-01 RX ADMIN — Medication 1 AMPULE: at 08:04

## 2021-01-01 RX ADMIN — IPRATROPIUM BROMIDE AND ALBUTEROL SULFATE 3 ML: .5; 3 SOLUTION RESPIRATORY (INHALATION) at 11:34

## 2021-01-01 RX ADMIN — RISPERIDONE 1 MG: 1 SOLUTION ORAL at 09:45

## 2021-01-01 RX ADMIN — Medication 1 AMPULE: at 20:20

## 2021-01-01 RX ADMIN — HUMAN INSULIN 9 UNITS: 100 INJECTION, SOLUTION SUBCUTANEOUS at 08:29

## 2021-01-01 RX ADMIN — Medication 1 AMPULE: at 09:05

## 2021-01-01 RX ADMIN — FUROSEMIDE 40 MG: 10 INJECTION, SOLUTION INTRAMUSCULAR; INTRAVENOUS at 08:40

## 2021-01-01 RX ADMIN — HYDRALAZINE HYDROCHLORIDE 10 MG: 10 TABLET, FILM COATED ORAL at 22:25

## 2021-01-01 RX ADMIN — RISPERIDONE 1 MG: 1 SOLUTION ORAL at 08:10

## 2021-01-01 RX ADMIN — HUMAN INSULIN 3 UNITS: 100 INJECTION, SOLUTION SUBCUTANEOUS at 20:06

## 2021-01-01 RX ADMIN — VANCOMYCIN HYDROCHLORIDE 1250 MG: 10 INJECTION, POWDER, LYOPHILIZED, FOR SOLUTION INTRAVENOUS at 11:40

## 2021-01-01 RX ADMIN — LINEZOLID 600 MG: 600 INJECTION, SOLUTION INTRAVENOUS at 10:27

## 2021-01-01 RX ADMIN — OXYCODONE HYDROCHLORIDE 15 MG: 5 TABLET ORAL at 10:07

## 2021-01-01 RX ADMIN — HYDRALAZINE HYDROCHLORIDE 10 MG: 10 TABLET, FILM COATED ORAL at 21:48

## 2021-01-01 RX ADMIN — ACETAMINOPHEN 650 MG: 325 TABLET, FILM COATED ORAL at 13:42

## 2021-01-01 RX ADMIN — FENTANYL CITRATE 50 MCG: 0.05 INJECTION, SOLUTION INTRAMUSCULAR; INTRAVENOUS at 02:04

## 2021-01-01 RX ADMIN — LORAZEPAM 2 MG: 2 INJECTION INTRAMUSCULAR; INTRAVENOUS at 13:41

## 2021-01-01 RX ADMIN — ALBUTEROL SULFATE 2.5 MG: 2.5 SOLUTION RESPIRATORY (INHALATION) at 14:00

## 2021-01-01 RX ADMIN — OXYCODONE HYDROCHLORIDE 15 MG: 5 TABLET ORAL at 23:08

## 2021-01-01 RX ADMIN — BARIUM SULFATE 15 ML: 400 PASTE ORAL at 11:20

## 2021-01-01 RX ADMIN — GUAIFENESIN 100 MG: 100 SOLUTION ORAL at 11:28

## 2021-01-01 RX ADMIN — HUMAN INSULIN 9 UNITS: 100 INJECTION, SOLUTION SUBCUTANEOUS at 17:43

## 2021-01-01 RX ADMIN — ALBUTEROL SULFATE 2.5 MG: 2.5 SOLUTION RESPIRATORY (INHALATION) at 16:35

## 2021-01-01 RX ADMIN — ALBUTEROL SULFATE 2.5 MG: 2.5 SOLUTION RESPIRATORY (INHALATION) at 20:15

## 2021-01-01 RX ADMIN — INSULIN HUMAN 2 UNITS: 100 INJECTION, SOLUTION PARENTERAL at 17:37

## 2021-01-01 RX ADMIN — MIDAZOLAM 4 MG: 1 INJECTION INTRAMUSCULAR; INTRAVENOUS at 09:27

## 2021-01-01 RX ADMIN — HUMAN INSULIN 9 UNITS: 100 INJECTION, SOLUTION SUBCUTANEOUS at 21:51

## 2021-01-01 RX ADMIN — FUROSEMIDE 40 MG: 40 TABLET ORAL at 08:08

## 2021-01-01 RX ADMIN — LINEZOLID 600 MG: 600 INJECTION, SOLUTION INTRAVENOUS at 20:05

## 2021-01-01 RX ADMIN — ACETAMINOPHEN 650 MG: 325 TABLET ORAL at 09:00

## 2021-01-01 RX ADMIN — CLOPIDOGREL BISULFATE 75 MG: 75 TABLET ORAL at 09:54

## 2021-01-01 RX ADMIN — INSULIN HUMAN 2 UNITS: 100 INJECTION, SOLUTION PARENTERAL at 05:12

## 2021-01-01 RX ADMIN — HUMAN INSULIN 3 UNITS: 100 INJECTION, SOLUTION SUBCUTANEOUS at 06:27

## 2021-01-01 RX ADMIN — FUROSEMIDE 40 MG: 40 TABLET ORAL at 08:36

## 2021-01-01 RX ADMIN — HUMAN INSULIN 6 UNITS: 100 INJECTION, SOLUTION SUBCUTANEOUS at 16:30

## 2021-01-01 RX ADMIN — FENTANYL CITRATE 50 MCG: 0.05 INJECTION, SOLUTION INTRAMUSCULAR; INTRAVENOUS at 12:05

## 2021-01-01 RX ADMIN — GABAPENTIN 100 MG: 100 CAPSULE ORAL at 16:58

## 2021-01-01 RX ADMIN — Medication 5 ML: at 05:47

## 2021-01-01 RX ADMIN — SODIUM CHLORIDE SOLN NEBU 3% 4 ML: 3 NEBU SOLN at 20:27

## 2021-01-01 RX ADMIN — SODIUM ZIRCONIUM CYCLOSILICATE 15 G: 10 POWDER, FOR SUSPENSION ORAL at 08:39

## 2021-01-01 RX ADMIN — SODIUM CHLORIDE 80 MG: 9 INJECTION, SOLUTION INTRAMUSCULAR; INTRAVENOUS; SUBCUTANEOUS at 20:36

## 2021-01-01 RX ADMIN — FENTANYL CITRATE 150 MCG/HR: 0.05 INJECTION, SOLUTION INTRAMUSCULAR; INTRAVENOUS at 23:32

## 2021-01-01 RX ADMIN — ACETAMINOPHEN 650 MG: 325 TABLET ORAL at 18:15

## 2021-01-01 RX ADMIN — HUMAN INSULIN 9 UNITS: 100 INJECTION, SOLUTION SUBCUTANEOUS at 23:48

## 2021-01-01 RX ADMIN — DEXAMETHASONE SODIUM PHOSPHATE 6 MG: 10 INJECTION INTRAMUSCULAR; INTRAVENOUS at 12:01

## 2021-01-01 RX ADMIN — LINEZOLID 600 MG: 600 TABLET, FILM COATED ORAL at 08:33

## 2021-01-01 RX ADMIN — HYDRALAZINE HYDROCHLORIDE 50 MG: 50 TABLET, FILM COATED ORAL at 17:29

## 2021-01-01 RX ADMIN — ACETAZOLAMIDE 500 MG: 250 TABLET ORAL at 09:04

## 2021-01-01 RX ADMIN — HUMAN INSULIN 12 UNITS: 100 INJECTION, SOLUTION SUBCUTANEOUS at 00:48

## 2021-01-01 RX ADMIN — HYDRALAZINE HYDROCHLORIDE 10 MG: 10 TABLET, FILM COATED ORAL at 17:01

## 2021-01-01 RX ADMIN — SODIUM CHLORIDE 10 ML: 9 INJECTION, SOLUTION INTRAMUSCULAR; INTRAVENOUS; SUBCUTANEOUS at 14:09

## 2021-01-01 RX ADMIN — IPRATROPIUM BROMIDE AND ALBUTEROL SULFATE 3 ML: .5; 3 SOLUTION RESPIRATORY (INHALATION) at 04:26

## 2021-01-01 RX ADMIN — LEVETIRACETAM 500 MG: 100 INJECTION, SOLUTION INTRAVENOUS at 23:18

## 2021-01-01 RX ADMIN — HEPARIN SODIUM 5000 UNITS: 5000 INJECTION INTRAVENOUS; SUBCUTANEOUS at 16:54

## 2021-01-01 RX ADMIN — DEXMEDETOMIDINE HYDROCHLORIDE 0.5 MCG/KG/HR: 100 INJECTION, SOLUTION INTRAVENOUS at 01:03

## 2021-01-01 RX ADMIN — SODIUM CHLORIDE 10 ML: 9 INJECTION, SOLUTION INTRAMUSCULAR; INTRAVENOUS; SUBCUTANEOUS at 21:12

## 2021-01-01 RX ADMIN — ALBUTEROL SULFATE 2.5 MG: 2.5 SOLUTION RESPIRATORY (INHALATION) at 04:04

## 2021-01-01 RX ADMIN — FENTANYL CITRATE 50 MCG: 0.05 INJECTION, SOLUTION INTRAMUSCULAR; INTRAVENOUS at 19:55

## 2021-01-01 RX ADMIN — HUMAN INSULIN 6 UNITS: 100 INJECTION, SOLUTION SUBCUTANEOUS at 02:03

## 2021-01-01 RX ADMIN — HEPARIN SODIUM 5000 UNITS: 5000 INJECTION INTRAVENOUS; SUBCUTANEOUS at 09:20

## 2021-01-01 RX ADMIN — HUMAN INSULIN 12 UNITS: 100 INJECTION, SOLUTION SUBCUTANEOUS at 00:00

## 2021-01-01 RX ADMIN — IOPAMIDOL 15 ML: 755 INJECTION, SOLUTION INTRAVENOUS at 13:13

## 2021-01-01 RX ADMIN — FENTANYL CITRATE 50 MCG: 50 INJECTION INTRAMUSCULAR; INTRAVENOUS at 08:51

## 2021-01-01 RX ADMIN — FUROSEMIDE 80 MG: 10 INJECTION, SOLUTION INTRAMUSCULAR; INTRAVENOUS at 09:48

## 2021-01-01 RX ADMIN — Medication 5 ML: at 14:00

## 2021-01-01 RX ADMIN — FAMOTIDINE 20 MG: 10 INJECTION INTRAVENOUS at 08:22

## 2021-01-01 RX ADMIN — HYDRALAZINE HYDROCHLORIDE 10 MG: 10 TABLET, FILM COATED ORAL at 15:58

## 2021-01-01 RX ADMIN — ALBUTEROL SULFATE 2.5 MG: 2.5 SOLUTION RESPIRATORY (INHALATION) at 09:08

## 2021-01-01 RX ADMIN — HUMAN INSULIN 10 UNITS: 100 INJECTION, SOLUTION SUBCUTANEOUS at 18:26

## 2021-01-01 RX ADMIN — Medication 1 AMPULE: at 21:20

## 2021-01-01 RX ADMIN — GABAPENTIN 100 MG: 100 CAPSULE ORAL at 08:43

## 2021-01-01 RX ADMIN — METOPROLOL SUCCINATE 12.5 MG: 25 TABLET, EXTENDED RELEASE ORAL at 08:42

## 2021-01-01 RX ADMIN — POLYETHYLENE GLYCOL 3350 17 G: 17 POWDER, FOR SOLUTION ORAL at 08:16

## 2021-01-01 RX ADMIN — FUROSEMIDE 80 MG: 10 INJECTION, SOLUTION INTRAMUSCULAR; INTRAVENOUS at 08:27

## 2021-01-01 RX ADMIN — INSULIN HUMAN 4 UNITS: 100 INJECTION, SOLUTION PARENTERAL at 06:43

## 2021-01-01 RX ADMIN — LORAZEPAM 2 MG: 2 INJECTION INTRAMUSCULAR; INTRAVENOUS at 19:28

## 2021-01-01 RX ADMIN — HEPARIN SODIUM 5000 UNITS: 5000 INJECTION INTRAVENOUS; SUBCUTANEOUS at 07:49

## 2021-01-01 RX ADMIN — HYDROMORPHONE HYDROCHLORIDE 2 MG: 1 INJECTION, SOLUTION INTRAMUSCULAR; INTRAVENOUS; SUBCUTANEOUS at 00:33

## 2021-01-01 RX ADMIN — INSULIN HUMAN 4 UNITS: 100 INJECTION, SOLUTION PARENTERAL at 17:32

## 2021-01-01 RX ADMIN — INSULIN LISPRO 6 UNITS: 100 INJECTION, SOLUTION INTRAVENOUS; SUBCUTANEOUS at 11:49

## 2021-01-01 RX ADMIN — FUROSEMIDE 20 MG: 10 INJECTION, SOLUTION INTRAMUSCULAR; INTRAVENOUS at 22:20

## 2021-01-01 RX ADMIN — DEXMEDETOMIDINE HYDROCHLORIDE 1.3 MCG/KG/HR: 100 INJECTION, SOLUTION, CONCENTRATE INTRAVENOUS at 00:44

## 2021-01-01 RX ADMIN — SODIUM CHLORIDE 10 ML: 9 INJECTION, SOLUTION INTRAMUSCULAR; INTRAVENOUS; SUBCUTANEOUS at 21:06

## 2021-01-01 RX ADMIN — IPRATROPIUM BROMIDE AND ALBUTEROL SULFATE 3 ML: .5; 3 SOLUTION RESPIRATORY (INHALATION) at 08:06

## 2021-01-01 RX ADMIN — INSULIN LISPRO 6 UNITS: 100 INJECTION, SOLUTION INTRAVENOUS; SUBCUTANEOUS at 11:10

## 2021-01-01 RX ADMIN — HYDRALAZINE HYDROCHLORIDE 10 MG: 10 TABLET, FILM COATED ORAL at 16:26

## 2021-01-01 RX ADMIN — CEFPODOXIME PROXETIL 200 MG: 200 TABLET, FILM COATED ORAL at 10:43

## 2021-01-01 RX ADMIN — SODIUM CHLORIDE 40 MG: 9 INJECTION, SOLUTION INTRAMUSCULAR; INTRAVENOUS; SUBCUTANEOUS at 08:40

## 2021-01-01 RX ADMIN — ALBUTEROL SULFATE 2.5 MG: 2.5 SOLUTION RESPIRATORY (INHALATION) at 04:06

## 2021-01-01 RX ADMIN — HUMAN INSULIN 3 UNITS: 100 INJECTION, SOLUTION SUBCUTANEOUS at 00:39

## 2021-01-01 RX ADMIN — ALBUTEROL SULFATE 2.5 MG: 2.5 SOLUTION RESPIRATORY (INHALATION) at 01:54

## 2021-01-01 RX ADMIN — FAMOTIDINE 20 MG: 10 INJECTION INTRAVENOUS at 08:16

## 2021-01-01 RX ADMIN — DEXMEDETOMIDINE HYDROCHLORIDE 1.3 MCG/KG/HR: 100 INJECTION, SOLUTION INTRAVENOUS at 23:06

## 2021-01-01 RX ADMIN — SODIUM CHLORIDE 10 ML: 9 INJECTION, SOLUTION INTRAMUSCULAR; INTRAVENOUS; SUBCUTANEOUS at 06:08

## 2021-01-01 RX ADMIN — HUMAN INSULIN 3 UNITS: 100 INJECTION, SOLUTION SUBCUTANEOUS at 10:32

## 2021-01-01 RX ADMIN — BUDESONIDE 500 MCG: 0.5 INHALANT RESPIRATORY (INHALATION) at 07:09

## 2021-01-01 RX ADMIN — HEPARIN SODIUM 5000 UNITS: 5000 INJECTION INTRAVENOUS; SUBCUTANEOUS at 08:22

## 2021-01-01 RX ADMIN — GUAIFENESIN 100 MG: 100 SOLUTION ORAL at 09:45

## 2021-01-01 RX ADMIN — ALUMINUM HYDROXIDE, MAGNESIUM HYDROXIDE, DIMETHICONE 5 ML: 200; 200; 20 LIQUID ORAL at 05:31

## 2021-01-01 RX ADMIN — HEPARIN SODIUM 5000 UNITS: 5000 INJECTION INTRAVENOUS; SUBCUTANEOUS at 00:13

## 2021-01-01 RX ADMIN — ALBUTEROL SULFATE 2.5 MG: 2.5 SOLUTION RESPIRATORY (INHALATION) at 01:29

## 2021-01-01 RX ADMIN — HEPARIN SODIUM 6200 UNITS: 5000 INJECTION INTRAVENOUS; SUBCUTANEOUS at 11:52

## 2021-01-01 RX ADMIN — HYDRALAZINE HYDROCHLORIDE 10 MG: 10 TABLET, FILM COATED ORAL at 15:19

## 2021-01-01 RX ADMIN — DEXAMETHASONE SODIUM PHOSPHATE 6 MG: 10 INJECTION INTRAMUSCULAR; INTRAVENOUS at 12:02

## 2021-01-01 RX ADMIN — HYDRALAZINE HYDROCHLORIDE 10 MG: 10 TABLET, FILM COATED ORAL at 21:13

## 2021-01-01 RX ADMIN — ALUMINUM HYDROXIDE, MAGNESIUM HYDROXIDE, DIMETHICONE 5 ML: 200; 200; 20 LIQUID ORAL at 23:34

## 2021-01-01 RX ADMIN — FENTANYL CITRATE 50 MCG: 50 INJECTION INTRAMUSCULAR; INTRAVENOUS at 12:55

## 2021-01-01 RX ADMIN — OXYCODONE HYDROCHLORIDE 15 MG: 5 TABLET ORAL at 23:24

## 2021-01-01 RX ADMIN — IPRATROPIUM BROMIDE AND ALBUTEROL SULFATE 3 ML: .5; 3 SOLUTION RESPIRATORY (INHALATION) at 15:11

## 2021-01-01 RX ADMIN — DEXMEDETOMIDINE HYDROCHLORIDE 1 MCG/KG/HR: 100 INJECTION, SOLUTION INTRAVENOUS at 13:02

## 2021-01-01 RX ADMIN — FENTANYL CITRATE 50 MCG: 50 INJECTION INTRAMUSCULAR; INTRAVENOUS at 07:48

## 2021-01-01 RX ADMIN — ALBUTEROL SULFATE 2.5 MG: 2.5 SOLUTION RESPIRATORY (INHALATION) at 20:11

## 2021-01-01 RX ADMIN — Medication 1 AMPULE: at 20:02

## 2021-01-01 RX ADMIN — SODIUM CHLORIDE 10 ML: 9 INJECTION, SOLUTION INTRAMUSCULAR; INTRAVENOUS; SUBCUTANEOUS at 13:48

## 2021-01-01 RX ADMIN — CEFPODOXIME PROXETIL 200 MG: 200 TABLET, FILM COATED ORAL at 09:19

## 2021-01-01 RX ADMIN — FUROSEMIDE 40 MG: 10 INJECTION, SOLUTION INTRAMUSCULAR; INTRAVENOUS at 21:52

## 2021-01-01 RX ADMIN — Medication 1 AMPULE: at 12:24

## 2021-01-01 RX ADMIN — PHENYLEPHRINE HYDROCHLORIDE 200 MCG: 10 INJECTION INTRAVENOUS at 08:46

## 2021-01-01 RX ADMIN — INSULIN LISPRO 8 UNITS: 100 INJECTION, SOLUTION INTRAVENOUS; SUBCUTANEOUS at 11:58

## 2021-01-01 RX ADMIN — SODIUM CHLORIDE 40 MG: 9 INJECTION, SOLUTION INTRAMUSCULAR; INTRAVENOUS; SUBCUTANEOUS at 08:34

## 2021-01-01 RX ADMIN — INSULIN GLARGINE 20 UNITS: 100 INJECTION, SOLUTION SUBCUTANEOUS at 09:30

## 2021-01-01 RX ADMIN — LINEZOLID 600 MG: 600 TABLET, FILM COATED ORAL at 22:08

## 2021-01-01 RX ADMIN — PANTOPRAZOLE SODIUM 40 MG: 40 INJECTION, POWDER, FOR SOLUTION INTRAVENOUS at 08:11

## 2021-01-01 RX ADMIN — POTASSIUM BICARBONATE 40 MEQ: 782 TABLET, EFFERVESCENT ORAL at 09:48

## 2021-01-01 RX ADMIN — HEPARIN SODIUM 5000 UNITS: 5000 INJECTION INTRAVENOUS; SUBCUTANEOUS at 05:43

## 2021-01-01 RX ADMIN — HEPARIN SODIUM 5000 UNITS: 5000 INJECTION INTRAVENOUS; SUBCUTANEOUS at 15:44

## 2021-01-01 RX ADMIN — RISPERIDONE 1 MG: 1 SOLUTION ORAL at 22:01

## 2021-01-01 RX ADMIN — HYDRALAZINE HYDROCHLORIDE 50 MG: 50 TABLET, FILM COATED ORAL at 05:42

## 2021-01-01 RX ADMIN — FENTANYL CITRATE 50 MCG: 50 INJECTION INTRAMUSCULAR; INTRAVENOUS at 02:59

## 2021-01-01 RX ADMIN — INSULIN GLARGINE 30 UNITS: 100 INJECTION, SOLUTION SUBCUTANEOUS at 21:51

## 2021-01-01 RX ADMIN — DEXMEDETOMIDINE HYDROCHLORIDE 1.4 MCG/KG/HR: 100 INJECTION, SOLUTION, CONCENTRATE INTRAVENOUS at 21:01

## 2021-01-01 RX ADMIN — ALBUTEROL SULFATE 2.5 MG: 2.5 SOLUTION RESPIRATORY (INHALATION) at 03:10

## 2021-01-01 RX ADMIN — IPRATROPIUM BROMIDE AND ALBUTEROL SULFATE 3 ML: .5; 3 SOLUTION RESPIRATORY (INHALATION) at 20:54

## 2021-01-01 RX ADMIN — ACETAMINOPHEN 650 MG: 325 TABLET, FILM COATED ORAL at 07:58

## 2021-01-01 RX ADMIN — DOXYCYCLINE 100 MG: 100 INJECTION, POWDER, LYOPHILIZED, FOR SOLUTION INTRAVENOUS at 10:17

## 2021-01-01 RX ADMIN — DEXMEDETOMIDINE HYDROCHLORIDE 1.5 MCG/KG/HR: 100 INJECTION, SOLUTION INTRAVENOUS at 21:26

## 2021-01-01 RX ADMIN — ALBUTEROL SULFATE 2.5 MG: 2.5 SOLUTION RESPIRATORY (INHALATION) at 14:22

## 2021-01-01 RX ADMIN — MORPHINE SULFATE 2 MG: 2 INJECTION, SOLUTION INTRAMUSCULAR; INTRAVENOUS at 12:13

## 2021-01-01 RX ADMIN — IPRATROPIUM BROMIDE AND ALBUTEROL SULFATE 3 ML: .5; 3 SOLUTION RESPIRATORY (INHALATION) at 03:39

## 2021-01-01 RX ADMIN — Medication 1 AMPULE: at 08:07

## 2021-01-01 RX ADMIN — INSULIN GLARGINE 20 UNITS: 100 INJECTION, SOLUTION SUBCUTANEOUS at 09:59

## 2021-01-01 RX ADMIN — SODIUM CHLORIDE SOLN NEBU 3% 4 ML: 3 NEBU SOLN at 22:40

## 2021-01-01 RX ADMIN — ALBUTEROL SULFATE 2.5 MG: 2.5 SOLUTION RESPIRATORY (INHALATION) at 19:29

## 2021-01-01 RX ADMIN — DEXMEDETOMIDINE HYDROCHLORIDE 1.2 MCG/KG/HR: 100 INJECTION, SOLUTION INTRAVENOUS at 06:40

## 2021-01-01 RX ADMIN — INSULIN LISPRO 2 UNITS: 100 INJECTION, SOLUTION INTRAVENOUS; SUBCUTANEOUS at 22:15

## 2021-01-01 RX ADMIN — LOPERAMIDE HCL 2 MG: 1 SOLUTION ORAL at 12:59

## 2021-01-01 RX ADMIN — Medication 1 AMPULE: at 09:26

## 2021-01-01 RX ADMIN — DEXMEDETOMIDINE HYDROCHLORIDE 1.2 MCG/KG/HR: 100 INJECTION, SOLUTION INTRAVENOUS at 11:33

## 2021-01-01 RX ADMIN — ACETAMINOPHEN 650 MG: 325 TABLET ORAL at 10:33

## 2021-01-01 RX ADMIN — SODIUM ZIRCONIUM CYCLOSILICATE 10 G: 10 POWDER, FOR SUSPENSION ORAL at 11:50

## 2021-01-01 RX ADMIN — INSULIN GLARGINE 30 UNITS: 100 INJECTION, SOLUTION SUBCUTANEOUS at 21:47

## 2021-01-01 RX ADMIN — HEPARIN SODIUM 5000 UNITS: 1000 INJECTION INTRAVENOUS; SUBCUTANEOUS at 13:20

## 2021-01-01 RX ADMIN — SODIUM BICARBONATE 50 MEQ: 84 INJECTION, SOLUTION INTRAVENOUS at 22:15

## 2021-01-01 RX ADMIN — FAMOTIDINE 20 MG: 40 POWDER, FOR SUSPENSION ORAL at 08:12

## 2021-01-01 RX ADMIN — INSULIN HUMAN 12 UNITS: 100 INJECTION, SOLUTION PARENTERAL at 03:29

## 2021-01-01 RX ADMIN — FAMOTIDINE 20 MG: 20 TABLET ORAL at 19:36

## 2021-01-01 RX ADMIN — Medication 5 ML: at 22:08

## 2021-01-01 RX ADMIN — Medication 200 MCG/HR: at 08:44

## 2021-01-01 RX ADMIN — SODIUM CHLORIDE 10 ML: 9 INJECTION, SOLUTION INTRAMUSCULAR; INTRAVENOUS; SUBCUTANEOUS at 22:11

## 2021-01-01 RX ADMIN — OXYCODONE HYDROCHLORIDE 15 MG: 5 TABLET ORAL at 17:09

## 2021-01-01 RX ADMIN — ALBUTEROL SULFATE 2.5 MG: 2.5 SOLUTION RESPIRATORY (INHALATION) at 19:44

## 2021-01-01 RX ADMIN — ALBUTEROL SULFATE 2.5 MG: 2.5 SOLUTION RESPIRATORY (INHALATION) at 19:37

## 2021-01-01 RX ADMIN — INSULIN GLARGINE 20 UNITS: 100 INJECTION, SOLUTION SUBCUTANEOUS at 10:02

## 2021-01-01 RX ADMIN — Medication 50 MCG/HR: at 11:00

## 2021-01-01 RX ADMIN — MORPHINE SULFATE 2 MG: 2 INJECTION, SOLUTION INTRAMUSCULAR; INTRAVENOUS at 13:59

## 2021-01-01 RX ADMIN — RDII 250 MG CAPSULE 250 MG: at 16:17

## 2021-01-01 RX ADMIN — SODIUM CHLORIDE SOLN NEBU 3% 4 ML: 3 NEBU SOLN at 21:52

## 2021-01-01 RX ADMIN — INSULIN GLARGINE 20 UNITS: 100 INJECTION, SOLUTION SUBCUTANEOUS at 08:54

## 2021-01-01 RX ADMIN — Medication 1 AMPULE: at 09:12

## 2021-01-01 RX ADMIN — FENTANYL CITRATE 50 MCG: 50 INJECTION INTRAMUSCULAR; INTRAVENOUS at 21:57

## 2021-01-01 RX ADMIN — HUMAN INSULIN 6 UNITS: 100 INJECTION, SOLUTION SUBCUTANEOUS at 05:27

## 2021-01-01 RX ADMIN — METOPROLOL SUCCINATE 12.5 MG: 25 TABLET, EXTENDED RELEASE ORAL at 08:49

## 2021-01-01 RX ADMIN — DEXMEDETOMIDINE HYDROCHLORIDE 1.5 MCG/KG/HR: 100 INJECTION, SOLUTION INTRAVENOUS at 10:02

## 2021-01-01 RX ADMIN — INSULIN GLARGINE 8 UNITS: 100 INJECTION, SOLUTION SUBCUTANEOUS at 22:12

## 2021-01-01 RX ADMIN — DOXYCYCLINE 100 MG: 100 INJECTION, POWDER, LYOPHILIZED, FOR SOLUTION INTRAVENOUS at 21:48

## 2021-01-01 RX ADMIN — FENTANYL CITRATE 50 MCG: 50 INJECTION INTRAMUSCULAR; INTRAVENOUS at 00:38

## 2021-01-01 RX ADMIN — LORAZEPAM 2 MG: 2 INJECTION INTRAMUSCULAR; INTRAVENOUS at 17:21

## 2021-01-01 RX ADMIN — DEXTROSE MONOHYDRATE 12.5 G: 500 INJECTION PARENTERAL at 07:47

## 2021-01-01 RX ADMIN — METOPROLOL TARTRATE 25 MG: 25 TABLET, FILM COATED ORAL at 08:18

## 2021-01-01 RX ADMIN — RISPERIDONE 1 MG: 1 TABLET ORAL at 08:25

## 2021-01-01 RX ADMIN — ASPIRIN 81 MG: 81 TABLET ORAL at 08:49

## 2021-01-01 RX ADMIN — METOPROLOL SUCCINATE 12.5 MG: 25 TABLET, EXTENDED RELEASE ORAL at 08:56

## 2021-01-01 RX ADMIN — MORPHINE SULFATE 2 MG: 2 INJECTION, SOLUTION INTRAMUSCULAR; INTRAVENOUS at 12:28

## 2021-01-01 RX ADMIN — DEXMEDETOMIDINE HYDROCHLORIDE 1.5 MCG/KG/HR: 100 INJECTION, SOLUTION INTRAVENOUS at 09:20

## 2021-01-01 RX ADMIN — DOXYCYCLINE 100 MG: 100 INJECTION, POWDER, LYOPHILIZED, FOR SOLUTION INTRAVENOUS at 08:17

## 2021-01-01 RX ADMIN — OXYCODONE HYDROCHLORIDE 15 MG: 5 TABLET ORAL at 17:27

## 2021-01-01 RX ADMIN — INSULIN HUMAN 4 UNITS: 100 INJECTION, SOLUTION PARENTERAL at 11:25

## 2021-01-01 RX ADMIN — HUMAN INSULIN 9 UNITS: 100 INJECTION, SOLUTION SUBCUTANEOUS at 08:54

## 2021-01-01 RX ADMIN — FENTANYL CITRATE 50 MCG: 50 INJECTION INTRAMUSCULAR; INTRAVENOUS at 17:21

## 2021-01-01 RX ADMIN — DEXMEDETOMIDINE HYDROCHLORIDE 1.5 MCG/KG/HR: 100 INJECTION, SOLUTION INTRAVENOUS at 22:25

## 2021-01-01 RX ADMIN — LORAZEPAM 1 MG: 2 INJECTION INTRAMUSCULAR; INTRAVENOUS at 01:05

## 2021-01-01 RX ADMIN — DEXTROSE MONOHYDRATE 50 ML/HR: 5 INJECTION, SOLUTION INTRAVENOUS at 12:09

## 2021-01-01 RX ADMIN — PANTOPRAZOLE SODIUM 40 MG: 40 TABLET, DELAYED RELEASE ORAL at 06:21

## 2021-01-01 RX ADMIN — ALBUTEROL SULFATE 2.5 MG: 2.5 SOLUTION RESPIRATORY (INHALATION) at 16:02

## 2021-01-01 RX ADMIN — BUDESONIDE 500 MCG: 0.5 INHALANT RESPIRATORY (INHALATION) at 07:12

## 2021-01-01 RX ADMIN — HEPARIN SODIUM 5000 UNITS: 5000 INJECTION INTRAVENOUS; SUBCUTANEOUS at 05:49

## 2021-01-01 RX ADMIN — MORPHINE SULFATE 4 MG: 4 INJECTION INTRAVENOUS at 19:04

## 2021-01-01 RX ADMIN — ALUMINUM HYDROXIDE, MAGNESIUM HYDROXIDE, DIMETHICONE 5 ML: 200; 200; 20 LIQUID ORAL at 23:59

## 2021-01-01 RX ADMIN — SODIUM CHLORIDE SOLN NEBU 3% 4 ML: 3 NEBU SOLN at 21:06

## 2021-01-01 RX ADMIN — SODIUM CHLORIDE 40 ML: 9 INJECTION, SOLUTION INTRAMUSCULAR; INTRAVENOUS; SUBCUTANEOUS at 13:13

## 2021-01-01 RX ADMIN — MODAFINIL 100 MG: 100 TABLET ORAL at 09:00

## 2021-01-01 RX ADMIN — Medication 5 ML: at 12:31

## 2021-01-01 RX ADMIN — SODIUM CHLORIDE 10 ML: 9 INJECTION, SOLUTION INTRAMUSCULAR; INTRAVENOUS; SUBCUTANEOUS at 05:55

## 2021-01-01 RX ADMIN — HEPARIN SODIUM 5000 UNITS: 5000 INJECTION INTRAVENOUS; SUBCUTANEOUS at 17:13

## 2021-01-01 RX ADMIN — FENTANYL CITRATE 50 MCG: 50 INJECTION INTRAMUSCULAR; INTRAVENOUS at 08:25

## 2021-01-01 RX ADMIN — INSULIN GLARGINE 30 UNITS: 100 INJECTION, SOLUTION SUBCUTANEOUS at 21:52

## 2021-01-01 RX ADMIN — ALBUTEROL SULFATE 2.5 MG: 2.5 SOLUTION RESPIRATORY (INHALATION) at 13:51

## 2021-01-01 RX ADMIN — ALBUMIN (HUMAN) 12.5 G: 0.25 INJECTION, SOLUTION INTRAVENOUS at 08:13

## 2021-01-01 RX ADMIN — DEXMEDETOMIDINE HYDROCHLORIDE 1.4 MCG/KG/HR: 100 INJECTION, SOLUTION INTRAVENOUS at 16:35

## 2021-01-01 RX ADMIN — ALBUTEROL SULFATE 2.5 MG: 2.5 SOLUTION RESPIRATORY (INHALATION) at 07:44

## 2021-01-01 RX ADMIN — PIPERACILLIN SODIUM AND TAZOBACTAM SODIUM 3.38 G: 3; .375 INJECTION, POWDER, LYOPHILIZED, FOR SOLUTION INTRAVENOUS at 11:52

## 2021-01-01 RX ADMIN — ALBUTEROL SULFATE 2.5 MG: 2.5 SOLUTION RESPIRATORY (INHALATION) at 20:42

## 2021-01-01 RX ADMIN — POLYETHYLENE GLYCOL 3350 17 G: 17 POWDER, FOR SOLUTION ORAL at 09:40

## 2021-01-01 RX ADMIN — NOREPINEPHRINE-DEXTROSE IV SOLUTION 4 MG/250ML-5% 8 MCG/MIN: 4-5/250 SOLUTION at 04:50

## 2021-01-01 RX ADMIN — RDII 250 MG CAPSULE 250 MG: at 09:00

## 2021-01-01 RX ADMIN — FENTANYL CITRATE 200 MCG/HR: 0.05 INJECTION, SOLUTION INTRAMUSCULAR; INTRAVENOUS at 04:51

## 2021-01-01 RX ADMIN — FUROSEMIDE 80 MG: 10 INJECTION, SOLUTION INTRAMUSCULAR; INTRAVENOUS at 09:34

## 2021-01-01 RX ADMIN — DEXTROSE MONOHYDRATE 25 G: 500 INJECTION PARENTERAL at 05:56

## 2021-01-01 RX ADMIN — DEXMEDETOMIDINE HYDROCHLORIDE 0.7 MCG/KG/HR: 100 INJECTION, SOLUTION, CONCENTRATE INTRAVENOUS at 23:04

## 2021-01-01 RX ADMIN — Medication 100 MCG/HR: at 23:41

## 2021-01-01 RX ADMIN — INSULIN GLARGINE 15 UNITS: 100 INJECTION, SOLUTION SUBCUTANEOUS at 09:56

## 2021-01-01 RX ADMIN — SODIUM CHLORIDE 10 ML: 9 INJECTION, SOLUTION INTRAMUSCULAR; INTRAVENOUS; SUBCUTANEOUS at 13:51

## 2021-01-01 RX ADMIN — HEPARIN SODIUM 5000 UNITS: 5000 INJECTION INTRAVENOUS; SUBCUTANEOUS at 09:23

## 2021-01-01 RX ADMIN — OXYCODONE HYDROCHLORIDE 15 MG: 5 TABLET ORAL at 05:50

## 2021-01-01 RX ADMIN — GUAIFENESIN 100 MG: 100 SOLUTION ORAL at 09:15

## 2021-01-01 RX ADMIN — FAMOTIDINE 20 MG: 20 TABLET, FILM COATED ORAL at 08:43

## 2021-01-01 RX ADMIN — Medication 1 AMPULE: at 22:06

## 2021-01-01 RX ADMIN — ROPINIROLE HYDROCHLORIDE 0.5 MG: 0.5 TABLET, FILM COATED ORAL at 08:45

## 2021-01-01 RX ADMIN — HUMAN INSULIN 3 UNITS: 100 INJECTION, SOLUTION SUBCUTANEOUS at 23:25

## 2021-01-01 RX ADMIN — FAMOTIDINE 20 MG: 10 INJECTION INTRAVENOUS at 09:31

## 2021-01-01 RX ADMIN — ROPINIROLE HYDROCHLORIDE 0.5 MG: 0.5 TABLET, FILM COATED ORAL at 08:54

## 2021-01-01 RX ADMIN — HYDRALAZINE HYDROCHLORIDE 10 MG: 10 TABLET, FILM COATED ORAL at 08:17

## 2021-01-01 RX ADMIN — CEFTRIAXONE 1 G: 1 INJECTION, POWDER, FOR SOLUTION INTRAMUSCULAR; INTRAVENOUS at 09:17

## 2021-01-01 RX ADMIN — INSULIN GLARGINE 20 UNITS: 100 INJECTION, SOLUTION SUBCUTANEOUS at 09:23

## 2021-01-01 RX ADMIN — ASPIRIN 81 MG: 81 TABLET ORAL at 08:45

## 2021-01-01 RX ADMIN — GABAPENTIN 100 MG: 100 CAPSULE ORAL at 16:05

## 2021-01-01 RX ADMIN — ALBUTEROL SULFATE 2.5 MG: 2.5 SOLUTION RESPIRATORY (INHALATION) at 21:15

## 2021-01-01 RX ADMIN — INSULIN LISPRO 4 UNITS: 100 INJECTION, SOLUTION INTRAVENOUS; SUBCUTANEOUS at 16:47

## 2021-01-01 RX ADMIN — LORAZEPAM 2 MG: 2 INJECTION INTRAMUSCULAR; INTRAVENOUS at 10:42

## 2021-01-01 RX ADMIN — LORAZEPAM 0.5 MG: 2 INJECTION INTRAMUSCULAR; INTRAVENOUS at 16:07

## 2021-01-01 RX ADMIN — Medication 200 MCG/HR: at 17:29

## 2021-01-01 RX ADMIN — HUMAN INSULIN 9 UNITS: 100 INJECTION, SOLUTION SUBCUTANEOUS at 16:46

## 2021-01-01 RX ADMIN — Medication 200 MCG/HR: at 21:37

## 2021-01-01 RX ADMIN — HEPARIN SODIUM 5000 UNITS: 5000 INJECTION INTRAVENOUS; SUBCUTANEOUS at 09:41

## 2021-01-01 RX ADMIN — FUROSEMIDE 40 MG: 10 INJECTION, SOLUTION INTRAMUSCULAR; INTRAVENOUS at 12:00

## 2021-01-01 RX ADMIN — ACETAMINOPHEN 650 MG: 650 SUPPOSITORY RECTAL at 11:25

## 2021-01-01 RX ADMIN — SODIUM CHLORIDE 10 ML: 9 INJECTION, SOLUTION INTRAMUSCULAR; INTRAVENOUS; SUBCUTANEOUS at 22:06

## 2021-01-01 RX ADMIN — DEXMEDETOMIDINE HYDROCHLORIDE 0.4 MCG/KG/HR: 100 INJECTION, SOLUTION, CONCENTRATE INTRAVENOUS at 11:08

## 2021-01-01 RX ADMIN — INSULIN LISPRO 8 UNITS: 100 INJECTION, SOLUTION INTRAVENOUS; SUBCUTANEOUS at 22:01

## 2021-01-01 RX ADMIN — DEXTROSE MONOHYDRATE 75 ML/HR: 5 INJECTION, SOLUTION INTRAVENOUS at 18:04

## 2021-01-01 RX ADMIN — DEXTROSE 25 G: 50 INJECTION, SOLUTION INTRAVENOUS at 17:09

## 2021-01-01 RX ADMIN — MIDAZOLAM 6 MG/HR: 5 INJECTION, SOLUTION INTRAMUSCULAR; INTRAVENOUS at 00:10

## 2021-01-01 RX ADMIN — METHYLPREDNISOLONE SODIUM SUCCINATE 40 MG: 40 INJECTION, POWDER, FOR SOLUTION INTRAMUSCULAR; INTRAVENOUS at 18:36

## 2021-01-01 RX ADMIN — SODIUM CHLORIDE 40 MG: 9 INJECTION, SOLUTION INTRAMUSCULAR; INTRAVENOUS; SUBCUTANEOUS at 08:04

## 2021-01-01 RX ADMIN — HEPARIN SODIUM 5000 UNITS: 5000 INJECTION INTRAVENOUS; SUBCUTANEOUS at 13:55

## 2021-01-01 RX ADMIN — Medication 10 ML: at 14:08

## 2021-01-01 RX ADMIN — CLOPIDOGREL BISULFATE 75 MG: 75 TABLET ORAL at 09:27

## 2021-01-01 RX ADMIN — SODIUM CHLORIDE 10 ML: 9 INJECTION, SOLUTION INTRAMUSCULAR; INTRAVENOUS; SUBCUTANEOUS at 13:02

## 2021-01-01 RX ADMIN — Medication 10 ML: at 13:16

## 2021-01-01 RX ADMIN — ALBUTEROL SULFATE 2.5 MG: 2.5 SOLUTION RESPIRATORY (INHALATION) at 03:18

## 2021-01-01 RX ADMIN — SODIUM ZIRCONIUM CYCLOSILICATE 15 G: 10 POWDER, FOR SUSPENSION ORAL at 09:31

## 2021-01-01 RX ADMIN — DEXMEDETOMIDINE HYDROCHLORIDE 1.5 MCG/KG/HR: 100 INJECTION, SOLUTION INTRAVENOUS at 20:26

## 2021-01-01 RX ADMIN — HUMAN INSULIN 3 UNITS: 100 INJECTION, SOLUTION SUBCUTANEOUS at 21:48

## 2021-01-01 RX ADMIN — CEFTRIAXONE 1 G: 1 INJECTION, POWDER, FOR SOLUTION INTRAMUSCULAR; INTRAVENOUS at 19:59

## 2021-01-01 RX ADMIN — FAMOTIDINE 20 MG: 20 TABLET, FILM COATED ORAL at 08:09

## 2021-01-01 RX ADMIN — DEXMEDETOMIDINE HYDROCHLORIDE 1.4 MCG/KG/HR: 100 INJECTION, SOLUTION INTRAVENOUS at 18:46

## 2021-01-01 RX ADMIN — HEPARIN SODIUM 5000 UNITS: 5000 INJECTION INTRAVENOUS; SUBCUTANEOUS at 13:35

## 2021-01-01 RX ADMIN — HYDRALAZINE HYDROCHLORIDE 10 MG: 10 TABLET, FILM COATED ORAL at 08:18

## 2021-01-01 RX ADMIN — INSULIN GLARGINE 20 UNITS: 100 INJECTION, SOLUTION SUBCUTANEOUS at 18:26

## 2021-01-01 RX ADMIN — DEXMEDETOMIDINE HYDROCHLORIDE 1.2 MCG/KG/HR: 100 INJECTION, SOLUTION, CONCENTRATE INTRAVENOUS at 04:40

## 2021-01-01 RX ADMIN — RISPERIDONE 0.5 MG: 1 SOLUTION ORAL at 08:20

## 2021-01-01 RX ADMIN — GUANFACINE 1 MG: 1 TABLET ORAL at 20:35

## 2021-01-01 RX ADMIN — LORAZEPAM 1 MG: 2 INJECTION INTRAMUSCULAR; INTRAVENOUS at 04:34

## 2021-01-01 RX ADMIN — SODIUM ZIRCONIUM CYCLOSILICATE 15 G: 10 POWDER, FOR SUSPENSION ORAL at 02:17

## 2021-01-01 RX ADMIN — ALBUTEROL SULFATE 2.5 MG: 2.5 SOLUTION RESPIRATORY (INHALATION) at 02:14

## 2021-01-01 RX ADMIN — FENTANYL CITRATE 50 MCG: 50 INJECTION INTRAMUSCULAR; INTRAVENOUS at 13:00

## 2021-01-01 RX ADMIN — Medication 10 ML: at 13:52

## 2021-01-01 RX ADMIN — ROPINIROLE HYDROCHLORIDE 0.5 MG: 0.5 TABLET, FILM COATED ORAL at 21:38

## 2021-01-01 RX ADMIN — RISPERIDONE 0.5 MG: 1 SOLUTION ORAL at 18:59

## 2021-01-01 RX ADMIN — ROPINIROLE HYDROCHLORIDE 0.5 MG: 0.5 TABLET, FILM COATED ORAL at 16:45

## 2021-01-01 RX ADMIN — INSULIN HUMAN 4 UNITS: 100 INJECTION, SOLUTION PARENTERAL at 18:10

## 2021-01-01 RX ADMIN — DEXMEDETOMIDINE HYDROCHLORIDE 0.9 MCG/KG/HR: 100 INJECTION, SOLUTION INTRAVENOUS at 16:32

## 2021-01-01 RX ADMIN — DOXYCYCLINE 100 MG: 100 INJECTION, POWDER, LYOPHILIZED, FOR SOLUTION INTRAVENOUS at 22:10

## 2021-01-01 RX ADMIN — RISPERIDONE 1 MG: 1 TABLET ORAL at 10:07

## 2021-01-01 RX ADMIN — ALBUMIN (HUMAN) 25 G: 0.25 INJECTION, SOLUTION INTRAVENOUS at 11:52

## 2021-01-01 RX ADMIN — Medication 1 AMPULE: at 08:48

## 2021-01-01 RX ADMIN — LOPERAMIDE HCL 2 MG: 1 SOLUTION ORAL at 12:15

## 2021-01-01 RX ADMIN — INSULIN GLARGINE 30 UNITS: 100 INJECTION, SOLUTION SUBCUTANEOUS at 21:28

## 2021-01-01 RX ADMIN — SODIUM CHLORIDE 10 ML: 9 INJECTION, SOLUTION INTRAMUSCULAR; INTRAVENOUS; SUBCUTANEOUS at 14:39

## 2021-01-01 RX ADMIN — LORAZEPAM 2 MG: 2 INJECTION INTRAMUSCULAR; INTRAVENOUS at 02:48

## 2021-01-01 RX ADMIN — FERROUS SULFATE TAB 325 MG (65 MG ELEMENTAL FE) 325 MG: 325 (65 FE) TAB at 08:54

## 2021-01-01 RX ADMIN — ALBUTEROL SULFATE 2.5 MG: 2.5 SOLUTION RESPIRATORY (INHALATION) at 14:49

## 2021-01-01 RX ADMIN — DEXMEDETOMIDINE HYDROCHLORIDE 1.3 MCG/KG/HR: 100 INJECTION, SOLUTION, CONCENTRATE INTRAVENOUS at 17:29

## 2021-01-01 RX ADMIN — BUDESONIDE 500 MCG: 0.5 INHALANT RESPIRATORY (INHALATION) at 08:07

## 2021-01-01 RX ADMIN — DEXMEDETOMIDINE HYDROCHLORIDE 0.9 MCG/KG/HR: 100 INJECTION, SOLUTION INTRAVENOUS at 03:38

## 2021-01-01 RX ADMIN — ALBUTEROL SULFATE 2.5 MG: 2.5 SOLUTION RESPIRATORY (INHALATION) at 20:55

## 2021-01-01 RX ADMIN — HUMAN INSULIN 4 UNITS: 100 INJECTION, SOLUTION SUBCUTANEOUS at 11:53

## 2021-01-01 RX ADMIN — HYDRALAZINE HYDROCHLORIDE 10 MG: 10 TABLET, FILM COATED ORAL at 08:13

## 2021-01-01 RX ADMIN — SODIUM CHLORIDE 10 ML: 9 INJECTION, SOLUTION INTRAMUSCULAR; INTRAVENOUS; SUBCUTANEOUS at 00:41

## 2021-01-01 RX ADMIN — BUDESONIDE 500 MCG: 0.5 INHALANT RESPIRATORY (INHALATION) at 08:10

## 2021-01-01 RX ADMIN — RDII 250 MG CAPSULE 250 MG: at 09:09

## 2021-01-01 RX ADMIN — IPRATROPIUM BROMIDE AND ALBUTEROL SULFATE 3 ML: .5; 3 SOLUTION RESPIRATORY (INHALATION) at 03:59

## 2021-01-01 RX ADMIN — HEPARIN SODIUM 5000 UNITS: 5000 INJECTION INTRAVENOUS; SUBCUTANEOUS at 00:44

## 2021-01-01 RX ADMIN — HEPARIN SODIUM 5000 UNITS: 5000 INJECTION INTRAVENOUS; SUBCUTANEOUS at 07:28

## 2021-01-01 RX ADMIN — RDII 250 MG CAPSULE 250 MG: at 09:58

## 2021-01-01 RX ADMIN — ALBUTEROL SULFATE 2.5 MG: 2.5 SOLUTION RESPIRATORY (INHALATION) at 22:40

## 2021-01-01 RX ADMIN — VANCOMYCIN HYDROCHLORIDE 1000 MG: 1 INJECTION, POWDER, LYOPHILIZED, FOR SOLUTION INTRAVENOUS at 14:43

## 2021-01-01 RX ADMIN — ALBUTEROL SULFATE 2.5 MG: 2.5 SOLUTION RESPIRATORY (INHALATION) at 03:15

## 2021-01-01 RX ADMIN — HUMAN INSULIN 20 UNITS: 100 INJECTION, SOLUTION SUBCUTANEOUS at 01:00

## 2021-01-01 RX ADMIN — HUMAN INSULIN 15 UNITS: 100 INJECTION, SOLUTION SUBCUTANEOUS at 20:38

## 2021-01-01 RX ADMIN — DEXMEDETOMIDINE HYDROCHLORIDE 0.3 MCG/KG/HR: 100 INJECTION, SOLUTION INTRAVENOUS at 14:11

## 2021-01-01 RX ADMIN — SODIUM CHLORIDE 75 ML/HR: 900 INJECTION, SOLUTION INTRAVENOUS at 22:20

## 2021-01-01 RX ADMIN — Medication 1 AMPULE: at 09:25

## 2021-01-01 RX ADMIN — INSULIN GLARGINE 14 UNITS: 100 INJECTION, SOLUTION SUBCUTANEOUS at 08:26

## 2021-01-01 RX ADMIN — Medication 10 ML: at 14:02

## 2021-01-01 RX ADMIN — HEPARIN SODIUM 5000 UNITS: 5000 INJECTION INTRAVENOUS; SUBCUTANEOUS at 08:52

## 2021-01-01 RX ADMIN — HUMAN INSULIN 6 UNITS: 100 INJECTION, SOLUTION SUBCUTANEOUS at 00:41

## 2021-01-01 RX ADMIN — RISPERIDONE 1 MG: 1 SOLUTION ORAL at 08:29

## 2021-01-01 RX ADMIN — Medication 5 ML: at 22:01

## 2021-01-01 RX ADMIN — SODIUM CHLORIDE 10 ML: 9 INJECTION, SOLUTION INTRAMUSCULAR; INTRAVENOUS; SUBCUTANEOUS at 05:22

## 2021-01-01 RX ADMIN — INSULIN HUMAN 6 UNITS: 100 INJECTION, SOLUTION PARENTERAL at 11:03

## 2021-01-01 RX ADMIN — Medication 1 AMPULE: at 21:03

## 2021-01-01 RX ADMIN — Medication 1000 MG: at 17:38

## 2021-01-01 RX ADMIN — LEVETIRACETAM 500 MG: 100 INJECTION, SOLUTION INTRAVENOUS at 00:53

## 2021-01-01 RX ADMIN — ALBUTEROL SULFATE 2.5 MG: 2.5 SOLUTION RESPIRATORY (INHALATION) at 08:35

## 2021-01-01 NOTE — PROGRESS NOTES
[facility] PROGRESS NOTE Follow up for: PRATIMA Subjective:  
Patient seen and examined in [facility] ICU remains intubated Labs and chart reviewed- renal function worsening, non oliguric ROS: 
Un able to obtain Objective:  
Exam: 
Vitals:  
 01/01/21 1015 01/01/21 1026 01/01/21 1130 01/01/21 1136 BP: 126/60  119/61 Pulse: 60 (!) 58 66 68 Resp: 29 29 30 29 Temp:   98.1 °F (36.7 °C) SpO2: 93% 93% 90% 90% Weight:      
Height:      
 
 
 
Intake/Output Summary (Last 24 hours) at 1/1/2021 1353 Last data filed at 1/1/2021 0800 Gross per 24 hour Intake 1920.83 ml Output 905 ml Net 1015.83 ml Current Facility-Administered Medications Medication Dose Route Frequency  bisacodyL (DULCOLAX) suppository 10 mg  10 mg Rectal DAILY PRN  
 insulin glargine (LANTUS) injection 20 Units  20 Units SubCUTAneous DAILY  polyethylene glycol (MIRALAX) packet 17 g  17 g Oral DAILY  dextrose 5% infusion  50 mL/hr IntraVENous CONTINUOUS  
 sodium zirconium cyclosilicate (LOKELMA) powder packet 5 g  5 g Oral DAILY  insulin lispro (HUMALOG) injection 0-15 Units  0-15 Units SubCUTAneous Q6H  
 senna-docusate (PERICOLACE) 8.6-50 mg per tablet 2 Tab  2 Tab Per NG tube DAILY  NUTRITIONAL SUPPORT ELECTROLYTE PRN ORDERS   Does Not Apply PRN  
 fentaNYL in normal saline (pf) 25 mcg/mL infusion  0-200 mcg/hr IntraVENous TITRATE  midazolam in normal saline (VERSED) 1 mg/mL infusion  0-10 mg/hr IntraVENous TITRATE  ascorbic acid (vitamin C) (VITAMIN C) tablet 1,000 mg  1,000 mg Per NG tube BID  cholecalciferol (VITAMIN D3) (1000 Units /25 mcg) tablet 5 Tab  5,000 Units Per NG tube DAILY  hydrALAZINE (APRESOLINE) tablet 10 mg  10 mg Per NG tube TID  zinc sulfate tablet 220 mg  220 mg Per G Tube DAILY  NOREPINephrine (LEVOPHED) 4 mg in 5% dextrose 250 mL infusion  0.5-16 mcg/min IntraVENous TITRATE  DOPamine (INTROPIN) 800 mg in dextrose 5% 250 mL infusion  0-50 mcg/kg/min IntraVENous TITRATE  famotidine (PF) (PEPCID) 20 mg in 0.9% sodium chloride 10 mL injection  20 mg IntraVENous DAILY  dextrose 40% (GLUTOSE) oral gel 1 Tube  15 g Oral PRN  
 glucagon (GLUCAGEN) injection 1 mg  1 mg IntraMUSCular PRN  
 dextrose (D50W) injection syrg 12.5-25 g  25-50 mL IntraVENous PRN  
 albuterol (PROVENTIL HFA, VENTOLIN HFA, PROAIR HFA) inhaler 2 Puff  2 Puff Inhalation Q4H PRN  
 hydrALAZINE (APRESOLINE) 20 mg/mL injection 20 mg  20 mg IntraVENous Q2H PRN  phenol throat spray (CHLORASEPTIC) 1 Spray  1 Spray Oral PRN  
 lip protectant (BLISTEX) ointment 1 Each  1 Each Topical PRN  
 acetaminophen (TYLENOL) tablet 650 mg  650 mg Oral Q6H PRN  
 0.9% sodium chloride infusion 250 mL  250 mL IntraVENous PRN  
 sodium chloride (NS) flush 5-40 mL  5-40 mL IntraVENous Q8H  
 sodium chloride (NS) flush 5-40 mL  5-40 mL IntraVENous PRN  
 heparin (porcine) injection 5,000 Units  5,000 Units SubCUTAneous Q8H  
 dexamethasone (DECADRON) 4 mg/mL injection 6 mg  6 mg IntraVENous DAILY  albuterol (PROVENTIL VENTOLIN) nebulizer solution 2.5 mg  2.5 mg Nebulization Q6H RT  
 
 
EXAM Seen through window GEN - intubated, sedated on vent in no distress CV - regular on monitor Lung - symmetric expansion Recent Labs 01/01/21 
0216 12/31/20 
0411 12/30/20 
9200 WBC 10.8 12.4* 15.4* HGB 10.3* 10.6* 11.9 HCT 32.9* 32.3* 35.8  238 238 Recent Labs 01/01/21 
0404 01/01/21 
0136 12/31/20 
2235 12/31/20 
0411  143  --  143  
K 5.2* 5.4* 5.8* 5.8*  
* 115*  --  114* CO2 22 22  --  23 BUN 89* 90*  --  85* CREA 3.25* 3.29*  --  3.36* CA 8.1* 8.4  --  8.2*  
* 219*  --  53* MG 3.0* 3.0*  --  2.9*  
PHOS 5.4* 5.7*  --  5.2* Assessment and Plan: 1. PRATIMA over CKD Possibly COVID ATN , non oliguric Baseline 1 to 1.5 , admitted with Cr of 2.8 on 12/24 -Echo from 2019 - Normal EF and systolic function Renal function worsening, non oliguric, no indication for acute RRT at this time Trending renal function with strict I&O - remains stable 2. Hyperkalemia- continue daily Lokelma, improved. 
  
3. COVID 19 + 4. Anemia trending down  
  
5. Hypermagnesemia and phosphatemia seen sec to PRATIMA Shahab Chris MD

## 2021-01-01 NOTE — PROGRESS NOTES
Ventilator check complete; patient has a #7.5 ET tube secured at the 22 at the lip. Patient is sedated. Patient is not able to follow commands. Breath sounds are coarse and diminished. Trachea is midline, Negative for subcutaneous air, and chest excursion is symmetric. Patient is also Negative for cyanosis and is Negative for pitting edema. All alarms are set and audible. Resuscitation bag is at the head of the bed. Ventilator Settings Mode FIO2 Rate Tidal Volume PEEP I:E Ratio VC+  40 %   28 bpm 400 ml  12 cm H20  1:1.38 Peak airway pressure: 29 cm H2O Minute ventilation: 11.8 l/min Josias Carpio, RT

## 2021-01-01 NOTE — PROGRESS NOTES
Comprehensive Nutrition Assessment This assessment was completed remotely. Type and Reason for Visit: Natan Richardson Tube Feeding Management (Pulmonary) Nutrition Recommendations/Plan:  
· Continue bowel regimen while on fentanyl drip: 2 tabs Pericolace daily and Miralax once daily. Daily prn dulcolax suppository added today · Enteral Nutrition: · Change trophic TF via OGT to Nepro at 15ml/hour, increase by 10 ml/hr every 24 hrs as tolerated to goal of 35 ml/hr. Increase  water flush to 120 ml every 4 hours. This will provide 1512 kcal (100% estimated calorie needs), 68 grams protein (100% estimated protein needs) , 1093 ml free fluid per day. · IV Fluids: Discontinue IVF order. · Vitamin and Mineral Supplement Therapy: · Electrolyte management replacement protocol implemented. · Labs:  
· EN labs: BMP daily, Mg and Phos MWF Malnutrition Assessment: 
Malnutrition Status: Insufficient data Nutrition Assessment:  
Nutrition History:12/31: 3 recorded meals in past 6 days with average intake of 83% Nutrition Background: H/O: CAD, HTN, DM, HLD, peripheral neuropathy, CKD stage III. Presented with dyspnea. Findings of +COVID. Admitted to ICU with ARDS, severe acute hypoxic due to COVID PNA, PRATIMA on CKD. Was requiring BIPAP at night and Aviro during day. Had brief cardiac arrest and was intubated 12/30 Daily Update: 
Remains on vent, MAP consistently >65. Off dobutamine and dopamine, levo at low rate of 2 mcg/min Trophic TF started at 10 ml/hr 12/31. Tolerating TF. Could change to renal formula and start slowly advancing Abdominal Status (last documented): Semi-soft, Intact abdomen with Active  bowel sounds. Last BM 12/29/20. Pertinent Medications: Vitamin C,dulcalax suppository prn added 1/1/2021 and dose given, vitamin D, decadron, dobutamine(off at 1400 yesterday), Dopamine (off at 1100), pepcid, fentanyl drip, Lantus (20 units once daily resumed today), SSI 9 units yesterday and 28 units thus far today), versed,levophed ( 2 mcg/min), lokelma, zinc, miralax, pericolace IVF: D5 at 48 ml/hr-not started Pertinent Labs:  
Lab Results Component Value Date/Time Sodium 144 01/01/2021 04:59 AM  
 Potassium 5.2 (H) 01/01/2021 04:59 AM  
 Chloride 115 (H) 01/01/2021 04:59 AM  
 CO2 22 01/01/2021 04:59 AM  
 Anion gap 7 01/01/2021 04:59 AM  
 Glucose 252 (H) 01/01/2021 04:59 AM  
 BUN 89 (H) 01/01/2021 04:59 AM  
 Creatinine 3.25 (H) 01/01/2021 04:59 AM  
 Calcium 8.1 (L) 01/01/2021 04:59 AM  
 Albumin 2.3 (L) 12/31/2020 04:11 AM  
 Phosphorus 5.4 (H) 01/01/2021 04:59 AM  
Mag 3.0 POC glucoses:161-278 past 24 hrs Nutrition Related Findings:  
NFPE deferred d/t isolation. Intubated and OGT placed 12/30. Current Nutrition Therapies: 
Current Tube Feeding (TF) Orders: · Feeding Route: Orogastric · Formula: Vitak AF 1.2 · Schedule:Continuous · Regimen: 10 ml/hr · Additives/Modulars: (None) · Water Flushes: 60 ml every 4 hrs · Current TF & Flush Orders Provides: 288 kcal (20% estimated calorie needs), 18 grams protein (32% estimated protein needs) , 26 grams CHO, 550 ml free fluid and 395 mg K per day · Current Intake: Average Meal Intake: NPO Anthropometric Measures: 
Height: 5' 5\" (165.1 cm) Current Body Wt: 72.1 kg (158 lb 15.2 oz), Weight source: Bed scale BMI: 26.5, Overweight (BMI 25.0-29. 9) Admission Body Weight: 159 lb 2.8 oz(Bedscale) Ideal Body Wt: 125 lbs (57 kg), 123.8 % Usual Body Wt: 73.5 kg (162 lb)(Per EMR 11/5/82020), Percent weight change: -4.5 Estimated Daily Nutrient Needs: 
Energy (kcal/day): 1514-5101 kcal/d (Kcal/kg(25-30), Weight Used: Ideal(57 kg)) Protein (g/day): 57-74 grams/d (1-1.3 grams/kg) Weight Used: (Ideal) Fluid (ml/day):   (1 ml/kcal) Nutrition Diagnosis:  
· Inadequate oral intake related to impaired respiratory function as evidenced by (NPO with TF to meet needs) Nutrition Interventions:  
Food and/or Nutrient Delivery: Continue NPO, Modify tube feeding Coordination of Nutrition Care: Continue to monitor while inpatient Plan of Care discussed with Rosalba Espino RN Goals: Previous Goal Met: Progressing toward goal(s) Active Goal: Intake >50% of needs within 5 days Nutrition Monitoring and Evaluation:  
  
Food/Nutrient Intake Outcomes: Enteral nutrition intake/tolerance Physical Signs/Symptoms Outcomes: Hemodynamic status, Biochemical data, GI status Discharge Planning: Too soon to determine [place], [address] [place] [address], [address] [place] [address], [address] [place] [address] Disaster Mode active

## 2021-01-01 NOTE — MED STUDENT NOTES
am 
1/1/2021 7:40 AM 
 
Admit Date: 12/24/2020 Admit Diagnosis: Acute respiratory distress syndrome (ARDS) due to severe acute respiratory syndrome coronavirus 2 (SARS-CoV-2) ([facility] [U07.1, J80] COVID-19 [U07.1] Subjective:  
 Patient remains on Dopamine drip and Levo drip. The patient is NSR 63 this morning. Potassium is 5.2 this morning which has improved. Objective:  
  
Visit Vitals BP (!) 114/58 Pulse 60 Temp 97.8 °F (36.6 °C) Resp 30 Ht 5' 5\" (1.651 m) Wt 72.1 kg (158 lb 15.2 oz) SpO2 92% BMI 26.45 kg/m² ROS:  General ROS: negative for - chills Hematological and Lymphatic ROS: negative for - blood clots or jaundice Respiratory ROS: no cough, shortness of breath, or wheezing Cardiovascular ROS: no chest pain or dyspnea on exertion Gastrointestinal ROS: no abdominal pain, change in bowel habits, or black or bloody stools Neurological ROS: no TIA or stroke symptoms Physical Exam: 
 
Physical Examination: General appearance - alert, well appearing, and in no distress Mental status - alert, oriented to person, place, and time Eyes - pupils equal and reactive, extraocular eye movements intact Neck/lymph - supple, no significant adenopathy Chest/CV - clear to auscultation, no wheezes, rales or rhonchi, symmetric air entry, not examined Heart - normal rate and regular rhythm Abdomen/GI - soft, nontender, nondistended, no masses or organomegaly Musculoskeletal - no joint tenderness, deformity or swelling Extremities - peripheral pulses normal, no pedal edema, no clubbing or cyanosis, not examined Skin - normal coloration and turgor, no rashes, no suspicious skin lesions noted Current Facility-Administered Medications Medication Dose Route Frequency  polyethylene glycol (MIRALAX) packet 17 g  17 g Oral DAILY  dextrose 5% infusion  50 mL/hr IntraVENous CONTINUOUS  
 sodium zirconium cyclosilicate (LOKELMA) powder packet 5 g  5 g Oral DAILY  insulin lispro (HUMALOG) injection 0-15 Units  0-15 Units SubCUTAneous Q6H  
 senna-docusate (PERICOLACE) 8.6-50 mg per tablet 2 Tab  2 Tab Per NG tube DAILY  NUTRITIONAL SUPPORT ELECTROLYTE PRN ORDERS   Does Not Apply PRN  
 fentaNYL in normal saline (pf) 25 mcg/mL infusion  0-200 mcg/hr IntraVENous TITRATE  midazolam in normal saline (VERSED) 1 mg/mL infusion  0-10 mg/hr IntraVENous TITRATE  ascorbic acid (vitamin C) (VITAMIN C) tablet 1,000 mg  1,000 mg Per NG tube BID  cholecalciferol (VITAMIN D3) (1000 Units /25 mcg) tablet 5 Tab  5,000 Units Per NG tube DAILY  hydrALAZINE (APRESOLINE) tablet 10 mg  10 mg Per NG tube TID  zinc sulfate tablet 220 mg  220 mg Per G Tube DAILY  NOREPINephrine (LEVOPHED) 4 mg in 5% dextrose 250 mL infusion  0.5-16 mcg/min IntraVENous TITRATE  DOPamine (INTROPIN) 800 mg in dextrose 5% 250 mL infusion  0-50 mcg/kg/min IntraVENous TITRATE  famotidine (PF) (PEPCID) 20 mg in 0.9% sodium chloride 10 mL injection  20 mg IntraVENous DAILY  dextrose 40% (GLUTOSE) oral gel 1 Tube  15 g Oral PRN  
 glucagon (GLUCAGEN) injection 1 mg  1 mg IntraMUSCular PRN  
 dextrose (D50W) injection syrg 12.5-25 g  25-50 mL IntraVENous PRN  
 albuterol (PROVENTIL HFA, VENTOLIN HFA, PROAIR HFA) inhaler 2 Puff  2 Puff Inhalation Q4H PRN  
 hydrALAZINE (APRESOLINE) 20 mg/mL injection 20 mg  20 mg IntraVENous Q2H PRN  
 [Held by provider] insulin glargine (LANTUS) injection 20 Units  20 Units SubCUTAneous BID  phenol throat spray (CHLORASEPTIC) 1 Spray  1 Spray Oral PRN  
 lip protectant (BLISTEX) ointment 1 Each  1 Each Topical PRN  
 acetaminophen (TYLENOL) tablet 650 mg  650 mg Oral Q6H PRN  
 0.9% sodium chloride infusion 250 mL  250 mL IntraVENous PRN  
 sodium chloride (NS) flush 5-40 mL  5-40 mL IntraVENous Q8H  
 sodium chloride (NS) flush 5-40 mL  5-40 mL IntraVENous PRN  
 heparin (porcine) injection 5,000 Units  5,000 Units SubCUTAneous Q8H  
  dexamethasone (DECADRON) 4 mg/mL injection 6 mg  6 mg IntraVENous DAILY  albuterol (PROVENTIL VENTOLIN) nebulizer solution 2.5 mg  2.5 mg Nebulization Q6H RT  
 
 
Data Review:  
@LABRCNT(Na,K,BUN,CREA,WBC,HGB,HCT,PLT,INR,TRP,TCHOL*,Triglyceride*,LDL*,LDLCPOC HDL*,HDL])@ TELEMETRY: NSR 63 Assessment/Plan:  
 
Principal Problem: 
  Acute respiratory distress syndrome (ARDS) due to severe acute respiratory syndrome coronavirus 2 (SARS-CoV-2) ([facility] (12/24/2020) Active Problems: 
  Diabetes mellitus with hyperosmolarity without hyperglycemic hyperosmolar nonketotic coma ([facility] (11/24/2014) Coronary artery disease involving coronary bypass graft of native heart without angina pectoris (6/15/2015) - The current medical regimen is effective;  continue present plan and medications. Uncontrolled type 2 diabetes mellitus with hyperglycemia ([facility] (6/15/2015) Acute on chronic renal failure ([facility] (9/17/2019) COVID-19 (12/24/2020) Acute hypoxemic respiratory failure ([facility] (12/24/2020) Acute cystitis without hematuria (12/29/2020) Cardiac arrest ([facility] (12/30/2020) -Bradycardia: After adjustments overnight by Cardio NP Shamika, the patient is NSR 63 this morning. Will sign off. Please call if patient's EKG changes or in heart block. Amos Wall NP Student

## 2021-01-01 NOTE — PROGRESS NOTES
Called by nursing regarding newest EKG. Rhythm changes noted on the monitor and EKG was obtained. New EKG shows SR with ST depression in inferior leads. No elevation noted. Patient currently on IV dopamine 16 mcg/kg/min for treatment of bradycardia after respiratory arrest. RN reports that when she weaned Dopamine drip to 14, patient had HRs in the 50 with stable BPs. MAP currently 107. K has been elevated at 5.8 most of the day. Recently treated by nursing with IV insulin, D50 and kayexalate. Repeat labs pending. Plan: 
-- Continue to wean IV dopamine as tolerated. HR OK in the 50s as long as BP stable. Keep MAP above 65 given renal failure. -- treat hyperkalemia if still present. -- no immediate cardiac interventions needed at this time regarding EKG findings.   
 
Maria Teresa Jensen NP 
2:11 AM

## 2021-01-01 NOTE — PROGRESS NOTES
EKG changes noted on monitor. EKG obtained. EKG reads: ST & T wave abnormality, consider inferior ischemia. Cardiology NP notified. Orders to wean Dopamine as tolerated, OK with HR in 50s as long as MAP > 65. GUY calculated with mixed venous gas. CI 1.7 
CO 3.13 SVR 1763

## 2021-01-01 NOTE — PROGRESS NOTES
Bedside shift report receieved from [facility], [address] [place] [address]. Pt sedated on Versed and Fentanyl gtts. Withdraws to pain in all extremities. On vent- fio2 40%. Sinus kevyn on monitor. Dual sign off on gtts. Versed @ 4 mg/hr Fentanyl @ 100 mcg/hr Dopamine @ 14 mcg/kg/min

## 2021-01-01 NOTE — PROGRESS NOTES
Ventilator check complete; patient has a #7.5 ET tube secured at the 22 at the lip. Patient is sedated. Patient is not able to follow commands. Breath sounds are expiratory wheezes. Trachea is midline, Negative for subcutaneous air, and chest excursion is symmetric. Patient is also Negative for cyanosis and is Negative for pitting edema. All alarms are set and audible. Resuscitation bag is at the head of the bed. Ventilator Settings Mode FIO2 Rate Tidal Volume Pressure PEEP I:E Ratio VC+  40 % 30 400 ml     12 cm H20  1:1.2 Peak airway pressure: 28 cm H2O Minute ventilation: 12 l/min ABG:  
Results for Ange Elizondo (MRN [id number]) as of 1/1/2021 10:31 
 1/1/2021 02:15  
pH, venous (POC) 7.27 (L)  
pCO2, venous (POC) 43.9  
pO2, venous (POC) 45 HCO3, venous (POC) 20.3 (L)  
sO2, venous (POC) 75 Base deficit, venous (POC) 6 Set Rate 28 Site CENTRAL LINE Device: VENT Mode ASSIST CONTROL Tidal volume 400 PEEP/CPAP (POC) 12 Allens test (POC) NOT APPLICABLE Respiratory comment: NurseNotified Inspiratory Time 0.9 COLLECT TIME [address]

## 2021-01-01 NOTE — PROGRESS NOTES
Robert Anthony Admission Date: 12/24/2020 Daily Progress Note: 1/1/2021 The patient's chart is reviewed and the patient is discussed with the staff. Patient has DM/CAD/HTN/CKD and presented with dyspnea. Saturation were 44% per EMS and in ER was placed on BIPAP since did not improve with NC. On 100% BIPAP and saturation only 93%. Recent covid exposure. Here the rapid Covid test was positive. ABG noted pH 7.31/41/70 on BIPAP 100%. CXR with diffuse infiltrates. Also noted worsening kidney function. Admitted to ICU 12/24. Given plasma and steroids. Creatinine too high for remdesivir. Nephrology consulted 12/26 for worsening renal failure. Subjective: Weaned to 40% on the vent. On levophed 2 and Dopa weaned off. Candy Sluder for high K of 5.2. No BM as yet. BS still high with lantus on hold. Current Facility-Administered Medications Medication Dose Route Frequency  polyethylene glycol (MIRALAX) packet 17 g  17 g Oral DAILY  dextrose 5% infusion  50 mL/hr IntraVENous CONTINUOUS  
 sodium zirconium cyclosilicate (LOKELMA) powder packet 5 g  5 g Oral DAILY  insulin lispro (HUMALOG) injection 0-15 Units  0-15 Units SubCUTAneous Q6H  
 senna-docusate (PERICOLACE) 8.6-50 mg per tablet 2 Tab  2 Tab Per NG tube DAILY  NUTRITIONAL SUPPORT ELECTROLYTE PRN ORDERS   Does Not Apply PRN  
 fentaNYL in normal saline (pf) 25 mcg/mL infusion  0-200 mcg/hr IntraVENous TITRATE  midazolam in normal saline (VERSED) 1 mg/mL infusion  0-10 mg/hr IntraVENous TITRATE  ascorbic acid (vitamin C) (VITAMIN C) tablet 1,000 mg  1,000 mg Per NG tube BID  cholecalciferol (VITAMIN D3) (1000 Units /25 mcg) tablet 5 Tab  5,000 Units Per NG tube DAILY  hydrALAZINE (APRESOLINE) tablet 10 mg  10 mg Per NG tube TID  zinc sulfate tablet 220 mg  220 mg Per G Tube DAILY  NOREPINephrine (LEVOPHED) 4 mg in 5% dextrose 250 mL infusion  0.5-16 mcg/min IntraVENous TITRATE  DOPamine (INTROPIN) 800 mg in dextrose 5% 250 mL infusion  0-50 mcg/kg/min IntraVENous TITRATE  famotidine (PF) (PEPCID) 20 mg in 0.9% sodium chloride 10 mL injection  20 mg IntraVENous DAILY  dextrose 40% (GLUTOSE) oral gel 1 Tube  15 g Oral PRN  
 glucagon (GLUCAGEN) injection 1 mg  1 mg IntraMUSCular PRN  
 dextrose (D50W) injection syrg 12.5-25 g  25-50 mL IntraVENous PRN  
 albuterol (PROVENTIL HFA, VENTOLIN HFA, PROAIR HFA) inhaler 2 Puff  2 Puff Inhalation Q4H PRN  
 hydrALAZINE (APRESOLINE) 20 mg/mL injection 20 mg  20 mg IntraVENous Q2H PRN  
 [Held by provider] insulin glargine (LANTUS) injection 20 Units  20 Units SubCUTAneous BID  phenol throat spray (CHLORASEPTIC) 1 Spray  1 Spray Oral PRN  
 lip protectant (BLISTEX) ointment 1 Each  1 Each Topical PRN  
 acetaminophen (TYLENOL) tablet 650 mg  650 mg Oral Q6H PRN  
 0.9% sodium chloride infusion 250 mL  250 mL IntraVENous PRN  
 sodium chloride (NS) flush 5-40 mL  5-40 mL IntraVENous Q8H  
 sodium chloride (NS) flush 5-40 mL  5-40 mL IntraVENous PRN  
 heparin (porcine) injection 5,000 Units  5,000 Units SubCUTAneous Q8H  
 dexamethasone (DECADRON) 4 mg/mL injection 6 mg  6 mg IntraVENous DAILY  albuterol (PROVENTIL VENTOLIN) nebulizer solution 2.5 mg  2.5 mg Nebulization Q6H RT  
 
Review of Systems Intubated, sedated Objective:  
 
Vitals:  
 01/01/21 1015 01/01/21 1026 01/01/21 1130 01/01/21 1136 BP: 126/60  119/61 Pulse: 60 (!) 58 66 68 Resp: 29 29 30 29 Temp:   98.1 °F (36.7 °C) SpO2: 93% 93% 90% 90% Weight:      
Height:      
 
Intake and Output:  
12/30 1901 - 01/01 0700 In: 2429.2 [I.V.:2089.2] Out: 1305 [BJUUX:[id number]] 01/01 0701 - 01/01 1900 In: 180 Out: - Physical Exam:  
Constitution:  the patient is elderly, sedated and in no acute distress EENMT:  Sclera clear, pupils equal, oral mucosa moist 
Respiratory: few scattered crackles Cardiovascular:  RRR without M,G,R 
 Gastrointestinal: soft and non-tender; with positive bowel sounds. Musculoskeletal: warm without cyanosis. There is no lower leg edema. Skin:  no jaundice or rashes, no wounds Neurologic: no gross neuro deficits Psychiatric:  Calm sedated Drips: 
 
Fentanyl 100 Versed 4 Levophed 2 CHEST XRAY: 12/31: intubated, improved edema, lines in adequate position CXR Results  (Last 48 hours) 12/31/20 0544  XR CHEST SNGL V Final result Impression:  IMPRESSION: Mildly improved bilateral lung edema or infiltrates. Narrative:  EXAM: Chest x-ray. INDICATION: Dyspnea. Covid 19. COMPARISON: Yesterday's chest x-ray. TECHNIQUE: Frontal view chest x-ray. FINDINGS: Diffuse bilateral lung edema or infiltrates have mildly improved. The  
cardiac size is stable. There has been a prior sternotomy. No pneumothorax or  
pleural effusion is seen. The endotracheal tube, enteric tube and left jugular  
central line remain in place. The endotracheal tube tip lies 3.8 cm above the  
troy. LAB No lab exists for component: [place] Recent Labs 01/01/21 
0216 12/31/20 
0411 12/30/20 
5591 WBC 10.8 12.4* 15.4* HGB 10.3* 10.6* 11.9 HCT 32.9* 32.3* 35.8  238 238 Recent Labs 01/01/21 
7592 01/01/21 
0136 12/31/20 
2235 12/31/20 
0411  143  --  143  
K 5.2* 5.4* 5.8* 5.8*  
* 115*  --  114* CO2 22 22  --  23  
* 219*  --  53* BUN 89* 90*  --  85* CREA 3.25* 3.29*  --  3.36* MG 3.0* 3.0*  --  2.9*  
CA 8.1* 8.4  --  8.2* PHOS 5.4* 5.7*  --  5.2* ALB  --   --   --  2.3* ABG:   
Lab Results Component Value Date/Time FIO2I 40 01/01/2021 02:15 AM  
 
MICRO 
SARS-CoV-2 LAB Results [facility] Test: No results found for: COV2NT  
[facility] Test: No results found for: [facility] Test: No components found for: [id number] Urine - 50-100k CFU e coli. 10-50 CFU skin farheen. CRP: 2.8 BNP 1536 PCT: 0.62 Echo: LEFT VENTRICLE: Size was normal. Systolic function was normal. Ejection 
fraction was estimated in the range of 60 % to 65 %. There were no regional 
wall motion abnormalities. There was mild concentric hypertrophy. The E/e' 
ratio was 20.75. There was diastolic dysfunction. 
  
RIGHT VENTRICLE: The size was normal. Systolic function was normal. Estimated 
peak pressure was in the range of 45-50 mmHg. 
  
LEFT ATRIUM: The atrium was mildly dilated. 
  
RIGHT ATRIUM: Size was normal. 
  
SYSTEMIC VEINS: IVC: The inferior vena cava was normal in size. Unable to 
assess phasicity due to patient being on vent. 
  
AORTIC VALVE: The valve was trileaflet. Leaflets exhibited mild sclerosis. There was no evidence for stenosis. There was no insufficiency. 
  
MITRAL VALVE: Valve structure was normal. There was no evidence for stenosis. There was trivial regurgitation. 
  
TRICUSPID VALVE: The valve structure was normal. There was no evidence for 
stenosis. There was mild to moderate regurgitation. 
  
PULMONIC VALVE: The valve structure was normal. There was no evidence for 
stenosis. There was no insufficiency. 
  
PERICARDIUM: There was no pericardial effusion. 
  
AORTA: The root exhibited normal size. Assessment:  (Medical Decision Making) Hospital Problems  Date Reviewed: 8/10/2019 Codes Class Noted POA Cardiac arrest [facility]) ICD-10-CM: I46.9 ICD-9-CM: 427.5  12/30/2020 Unknown Only lasted about 10 seconds on monitor but desat into 20's came right back. Echo [id number] Viktoria Molina Acute cystitis without hematuria ICD-10-CM: N30.00 ICD-9-CM: 595.0  12/29/2020 Unknown Rocphin. COVID-19 ICD-10-CM: U07.1 ICD-9-CM: 079.89  12/24/2020 Unknown Ongoing. * (Principal) Acute respiratory distress syndrome (ARDS) due to severe acute respiratory syndrome coronavirus 2 (SARS-CoV-2) ([facility]) ICD-10-CM: U07.1, J80 
ICD-9-CM: 518.82, 079.89  12/24/2020 Unknown Acute hypoxemic respiratory failure ([facility] [address] ICD-10-CM: J96.01 
ICD-9-CM: 518.81  12/24/2020 Unknown Worse. Sats poor overnight. Now intubated. 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 Diabetes mellitus with hyperosmolarity without hyperglycemic hyperosmolar nonketotic coma (HCC) (Chronic) ICD-10-CM: E11.00 ICD-9-CM: 250.20  11/24/2014 Yes Patient with ARDS, severe acute hypoxic due to COVID PNA. Brief arrest 2 days ago and now intubated for ongoing hypoxemia and respiratory instability. Plan:  (Medical Decision Making)  
 
-full vent support for now. Try to wean to PSV.  
-continue versed, fentanyl; try to wean. -cont dexamethasone, EOT 1/2.  
-not a candidate for remdesivir due to PRATIMA. -nephro following for PRATIMA. -Lantus once daily.  
-cont heparin 5000 q 8 
-cont protonix.   
-getting ceftriaxone for e coli in urine. -CVL for drips  
-Dulcolax to promote BM. Called daughter Domenic Ramírez with update [phone]) Critically ill. E/M 31 minutes Elvi Peralta MD

## 2021-01-02 NOTE — PROGRESS NOTES
Ventilator check complete; patient has a #7.5 ET tube secured at the 22 at the lip. Patient is sedated. Patient is not able to follow commands. Breath sounds are diminished. Trachea is midline, Negative for subcutaneous air, and chest excursion is symmetric. Patient is also Negative for cyanosis and is Negative for pitting edema. All alarms are set and audible. Resuscitation bag is at the head of the bed. Ventilator Settings Mode FIO2 Rate Tidal Volume Pressure PEEP I:E Ratio VC+  50 %  30  400 ml     12 cm H20  1:1.2 Peak airway pressure: 31 cm H2O Minute ventilation: 12.2 l/min [address], RT

## 2021-01-02 NOTE — PROGRESS NOTES
Clayton Vargas Admission Date: 12/24/2020 Daily Progress Note: 1/2/2021 The patient's chart is reviewed and the patient is discussed with the staff. Patient has DM/CAD/HTN/CKD and presented with dyspnea. Saturation were 44% per EMS and in ER was placed on BIPAP since did not improve with NC. On 100% BIPAP and saturation only 93%. Recent covid exposure. Here the rapid Covid test was positive. ABG noted pH 7.31/41/70 on BIPAP 100%. CXR with diffuse infiltrates. Also noted worsening kidney function. Admitted to ICU 12/24. Given plasma and steroids. Creatinine too high for remdesivir. Nephrology consulted 12/26 for worsening renal failure. Subjective:  
 
Off of levo and versed. Fentanyl at 100. Still on 40%. UOP remains adequate. Intermittently agitates. Had BM and K better. Completes decadron today. Current Facility-Administered Medications Medication Dose Route Frequency  bisacodyL (DULCOLAX) suppository 10 mg  10 mg Rectal DAILY PRN  
 insulin glargine (LANTUS) injection 20 Units  20 Units SubCUTAneous DAILY  polyethylene glycol (MIRALAX) packet 17 g  17 g Oral DAILY  dextrose 5% infusion  50 mL/hr IntraVENous CONTINUOUS  
 sodium zirconium cyclosilicate (LOKELMA) powder packet 5 g  5 g Oral DAILY  insulin lispro (HUMALOG) injection 0-15 Units  0-15 Units SubCUTAneous Q6H  
 senna-docusate (PERICOLACE) 8.6-50 mg per tablet 2 Tab  2 Tab Per NG tube DAILY  NUTRITIONAL SUPPORT ELECTROLYTE PRN ORDERS   Does Not Apply PRN  
 fentaNYL in normal saline (pf) 25 mcg/mL infusion  0-200 mcg/hr IntraVENous TITRATE  midazolam in normal saline (VERSED) 1 mg/mL infusion  0-10 mg/hr IntraVENous TITRATE  ascorbic acid (vitamin C) (VITAMIN C) tablet 1,000 mg  1,000 mg Per NG tube BID  cholecalciferol (VITAMIN D3) (1000 Units /25 mcg) tablet 5 Tab  5,000 Units Per NG tube DAILY  [Held by provider] hydrALAZINE (APRESOLINE) tablet 10 mg  10 mg Per NG tube TID  zinc sulfate tablet 220 mg  220 mg Per G Tube DAILY  NOREPINephrine (LEVOPHED) 4 mg in 5% dextrose 250 mL infusion  0.5-16 mcg/min IntraVENous TITRATE  DOPamine (INTROPIN) 800 mg in dextrose 5% 250 mL infusion  0-50 mcg/kg/min IntraVENous TITRATE  famotidine (PF) (PEPCID) 20 mg in 0.9% sodium chloride 10 mL injection  20 mg IntraVENous DAILY  dextrose 40% (GLUTOSE) oral gel 1 Tube  15 g Oral PRN  
 glucagon (GLUCAGEN) injection 1 mg  1 mg IntraMUSCular PRN  
 dextrose (D50W) injection syrg 12.5-25 g  25-50 mL IntraVENous PRN  
 albuterol (PROVENTIL HFA, VENTOLIN HFA, PROAIR HFA) inhaler 2 Puff  2 Puff Inhalation Q4H PRN  
 hydrALAZINE (APRESOLINE) 20 mg/mL injection 20 mg  20 mg IntraVENous Q2H PRN  phenol throat spray (CHLORASEPTIC) 1 Spray  1 Spray Oral PRN  
 lip protectant (BLISTEX) ointment 1 Each  1 Each Topical PRN  
 acetaminophen (TYLENOL) tablet 650 mg  650 mg Oral Q6H PRN  
 0.9% sodium chloride infusion 250 mL  250 mL IntraVENous PRN  
 sodium chloride (NS) flush 5-40 mL  5-40 mL IntraVENous Q8H  
 sodium chloride (NS) flush 5-40 mL  5-40 mL IntraVENous PRN  
 heparin (porcine) injection 5,000 Units  5,000 Units SubCUTAneous Q8H  
 albuterol (PROVENTIL VENTOLIN) nebulizer solution 2.5 mg  2.5 mg Nebulization Q6H RT  
 
Review of Systems Intubated, sedated Objective:  
 
Vitals:  
 01/02/21 5510 01/02/21 6109 01/02/21 0623 01/02/21 1842 BP: (!) 167/71 (!) 172/84 126/67 (!) 116/58 Pulse: 100 (!) 116 80 77 Resp: 18 21 29 29 Temp:      
SpO2: 92% 91% 98% 98% Weight:      
Height:      
 
Intake and Output:  
12/31 1901 - 01/02 0700 In: 1655.1 [I.V.:570.1] Out: 1100 [Urine:1100] No intake/output data recorded. Physical Exam:  
Constitution:  the patient is elderly, sedated and in no acute distress EENMT:  Sclera clear, pupils equal, oral mucosa moist 
 Respiratory: few scattered crackles Cardiovascular:  RRR without M,G,R 
Gastrointestinal: soft and non-tender; with positive bowel sounds. Musculoskeletal: warm without cyanosis. There is no lower leg edema. Skin:  no jaundice or rashes, no wounds Neurologic: no gross neuro deficits Psychiatric:  Calm sedated Drips: 
 
Fentanyl 100 CHEST XRAY: 12/31: intubated, improved edema, lines in adequate position CXR Results  (Last 48 hours) None LAB No lab exists for component: [place] Recent Labs 01/02/21 
9683 01/01/21 
0216 12/31/20 
0411 WBC 12.0* 10.8 12.4* HGB 8.1* 10.3* 10.6* HCT 26.5* 32.9* 32.3*  
 206 238 Recent Labs 01/02/21 
9408 01/01/21 
3555 01/01/21 
0136 12/31/20 
0411 12/31/20 
0411 * 144 143  --  143  
K 4.2 5.2* 5.4*   < > 5.8*  
* 115* 115*  --  114* CO2 24 22 22  --  23 * 252* 219*  --  53* * 89* 90*  --  85* CREA 3.30* 3.25* 3.29*  --  3.36* MG  --  3.0* 3.0*  --  2.9*  
CA 8.0* 8.1* 8.4  --  8.2* PHOS  --  5.4* 5.7*  --  5.2* ALB  --   --   --   --  2.3*  
 < > = values in this interval not displayed. ABG:   
Lab Results Component Value Date/Time PHI 7.31 (L) 01/02/2021 04:24 AM  
 PCO2I 41.6 01/02/2021 04:24 AM  
 PO2I 70 (L) 01/02/2021 04:24 AM  
 HCO3I 20.7 (L) 01/02/2021 04:24 AM  
 FIO2I 50 01/02/2021 04:24 AM  
 
MICRO 
SARS-CoV-2 LAB Results [facility] Test: No results found for: COV2NT  
[facility] Test: No results found for: [facility] Test: No components found for: [id number] Urine - 50-100k CFU e coli. 10-50 CFU skin farheen. CRP: 2.8 BNP 1536 PCT: 0.62 Echo: LEFT VENTRICLE: Size was normal. Systolic function was normal. Ejection 
fraction was estimated in the range of 60 % to 65 %. There were no regional 
wall motion abnormalities. There was mild concentric hypertrophy. The E/e' 
ratio was 20.75.  There was diastolic dysfunction. 
  
 RIGHT VENTRICLE: The size was normal. Systolic function was normal. Estimated 
peak pressure was in the range of 45-50 mmHg. 
  
LEFT ATRIUM: The atrium was mildly dilated. 
  
RIGHT ATRIUM: Size was normal. 
  
SYSTEMIC VEINS: IVC: The inferior vena cava was normal in size. Unable to 
assess phasicity due to patient being on vent. 
  
AORTIC VALVE: The valve was trileaflet. Leaflets exhibited mild sclerosis. There was no evidence for stenosis. There was no insufficiency. 
  
MITRAL VALVE: Valve structure was normal. There was no evidence for stenosis. There was trivial regurgitation. 
  
TRICUSPID VALVE: The valve structure was normal. There was no evidence for 
stenosis. There was mild to moderate regurgitation. 
  
PULMONIC VALVE: The valve structure was normal. There was no evidence for 
stenosis. There was no insufficiency. 
  
PERICARDIUM: There was no pericardial effusion. 
  
AORTA: The root exhibited normal size. Assessment:  (Medical Decision Making) Hospital Problems  Date Reviewed: 8/10/2019 Codes Class Noted POA Cardiac arrest [facility]) ICD-10-CM: I46.9 ICD-9-CM: 427.5  12/30/2020 Unknown Only lasted about 10 seconds on monitor but desat into 20's came right back. Echo 50413 Viktoria Molina Acute cystitis without hematuria ICD-10-CM: N30.00 ICD-9-CM: 595.0  12/29/2020 Unknown Rocphin completed. COVID-19 ICD-10-CM: U07.1 ICD-9-CM: 079.89  12/24/2020 Unknown Ongoing. Completing decadron today. * (Principal) Acute respiratory distress syndrome (ARDS) due to severe acute respiratory syndrome coronavirus 2 (SARS-CoV-2) ([facility]) ICD-10-CM: U07.1, J80 
ICD-9-CM: 518.82, 079.89  12/24/2020 Unknown Acute hypoxemic respiratory failure ([facility] [address] ICD-10-CM: J96.01 
ICD-9-CM: 518.81  12/24/2020 Unknown Oxygenation adequate on 50% Acute on chronic renal failure (HCC) ICD-10-CM: N17.9, N18.9 ICD-9-CM: 584.9, 585.9  9/17/2019 Unknown Cr stable. Coronary artery disease involving coronary bypass graft of native heart without angina pectoris (Chronic) ICD-10-CM: D24.585 ICD-9-CM: 414.05  6/15/2015 Yes Uncontrolled type 2 diabetes mellitus with hyperglycemia (HCC) (Chronic) ICD-10-CM: E11.65 ICD-9-CM: 250.02  6/15/2015 Yes Resume lantus once daily. Diabetes mellitus with hyperosmolarity without hyperglycemic hyperosmolar nonketotic coma (HCC) (Chronic) ICD-10-CM: E11.00 ICD-9-CM: 250.20  11/24/2014 Yes Patient with ARDS, severe acute hypoxic due to COVID PNA. Plan:  (Medical Decision Making) Start precedex. Try on PSV to achieve volumes close to 6ml/kg IBW. Lasix x 1 today. Cont H2B. Hold lokelma today. Cont TF. Finished rocephin for UTI. Check CXR tomorrow Called daughter Kedar Mansfield with update [phone]) Critically ill. E/M 31 minutes Sanjuana Medel MD

## 2021-01-02 NOTE — PROGRESS NOTES
[facility] PROGRESS NOTE Follow up for: PRATIMA Subjective:  
Patient seen and examined in [facility] ICU remains intubated Labs and chart reviewed- renal function worsening, non oliguric ROS: 
Unable to obtain Objective:  
Exam: 
Vitals:  
 01/02/21 0948 01/02/21 1003 01/02/21 1018 01/02/21 1033 BP: (!) 114/56 (!) 112/59 (!) 115/59 (!) 154/71 Pulse: 75 69 66 91 Resp: 30 30 30 17 Temp:      
SpO2: 98% 98% 98% 91% Weight:      
Height:      
 
 
 
Intake/Output Summary (Last 24 hours) at 1/2/2021 1205 Last data filed at 1/2/2021 0400 Gross per 24 hour Intake 920.94 ml Output 675 ml Net 245.94 ml Current Facility-Administered Medications Medication Dose Route Frequency  dexmedeTOMidine (PRECEDEX) 400 mcg in 0.9% sodium chloride 100 mL infusion  0.1-1.5 mcg/kg/hr IntraVENous TITRATE  bisacodyL (DULCOLAX) suppository 10 mg  10 mg Rectal DAILY PRN  
 insulin glargine (LANTUS) injection 20 Units  20 Units SubCUTAneous DAILY  polyethylene glycol (MIRALAX) packet 17 g  17 g Oral DAILY  [Held by provider] sodium zirconium cyclosilicate (LOKELMA) powder packet 5 g  5 g Oral DAILY  insulin lispro (HUMALOG) injection 0-15 Units  0-15 Units SubCUTAneous Q6H  
 senna-docusate (PERICOLACE) 8.6-50 mg per tablet 2 Tab  2 Tab Per NG tube DAILY  NUTRITIONAL SUPPORT ELECTROLYTE PRN ORDERS   Does Not Apply PRN  
 fentaNYL in normal saline (pf) 25 mcg/mL infusion  0-200 mcg/hr IntraVENous TITRATE  ascorbic acid (vitamin C) (VITAMIN C) tablet 1,000 mg  1,000 mg Per NG tube BID  cholecalciferol (VITAMIN D3) (1000 Units /25 mcg) tablet 5 Tab  5,000 Units Per NG tube DAILY  [Held by provider] hydrALAZINE (APRESOLINE) tablet 10 mg  10 mg Per NG tube TID  zinc sulfate tablet 220 mg  220 mg Per G Tube DAILY  famotidine (PF) (PEPCID) 20 mg in 0.9% sodium chloride 10 mL injection  20 mg IntraVENous DAILY  dextrose 40% (GLUTOSE) oral gel 1 Tube  15 g Oral PRN  
  glucagon (GLUCAGEN) injection 1 mg  1 mg IntraMUSCular PRN  
 dextrose (D50W) injection syrg 12.5-25 g  25-50 mL IntraVENous PRN  
 albuterol (PROVENTIL HFA, VENTOLIN HFA, PROAIR HFA) inhaler 2 Puff  2 Puff Inhalation Q4H PRN  
 hydrALAZINE (APRESOLINE) 20 mg/mL injection 20 mg  20 mg IntraVENous Q2H PRN  phenol throat spray (CHLORASEPTIC) 1 Spray  1 Spray Oral PRN  
 lip protectant (BLISTEX) ointment 1 Each  1 Each Topical PRN  
 acetaminophen (TYLENOL) tablet 650 mg  650 mg Oral Q6H PRN  
 0.9% sodium chloride infusion 250 mL  250 mL IntraVENous PRN  
 sodium chloride (NS) flush 5-40 mL  5-40 mL IntraVENous Q8H  
 sodium chloride (NS) flush 5-40 mL  5-40 mL IntraVENous PRN  
 heparin (porcine) injection 5,000 Units  5,000 Units SubCUTAneous Q8H  
 albuterol (PROVENTIL VENTOLIN) nebulizer solution 2.5 mg  2.5 mg Nebulization Q6H RT  
 
 
EXAM Seen through window GEN - intubated, sedated on vent in no distress CV - regular on monitor Lung - symmetric expansion Recent Labs 01/02/21 
4686 01/01/21 
0216 12/31/20 
0411 WBC 12.0* 10.8 12.4* HGB 8.1* 10.3* 10.6* HCT 26.5* 32.9* 32.3*  
 206 238 Recent Labs 01/02/21 
5434 01/01/21 
3897 01/01/21 
0136 12/31/20 
0411 12/31/20 
0411 * 144 143  --  143  
K 4.2 5.2* 5.4*   < > 5.8*  
* 115* 115*  --  114* CO2 24 22 22  --  23 * 89* 90*  --  85* CREA 3.30* 3.25* 3.29*  --  3.36* CA 8.0* 8.1* 8.4  --  8.2*  
* 252* 219*  --  53* MG  --  3.0* 3.0*  --  2.9*  
PHOS  --  5.4* 5.7*  --  5.2*  
 < > = values in this interval not displayed. Assessment and Plan: 1. PRATIMA over CKD Possibly COVID ATN , non oliguric Baseline 1 to 1.5 , admitted with Cr of 2.8 on 12/24  
-Echo from 2019 - Normal EF and systolic function 
- Continued stable kidney function. Off Lokelma, potassium stable. 2. Hyperkalemia- resolved 
  
3. COVID 19 + 4.  Anemia trending down  
   
Octavio Barr MD

## 2021-01-02 NOTE — PROGRESS NOTES
Ventilator check complete; patient has a #7.5 ET tube secured at the 22 at the lip. Patient is sedated. Patient is not able to follow commands. Breath sounds are coarse and diminished. Trachea is midline, Negative for subcutaneous air, and chest excursion is symmetric. Patient is also Negative for cyanosis and is Negative for pitting edema. All alarms are set and audible. Resuscitation bag is at the head of the bed. Ventilator Settings Mode FIO2 Rate Tidal Volume Pressure PEEP I:E Ratio VC+  40 % 30  400 ml     12 cm H20  1:1.2 Peak airway pressure: 29 cm H2O Minute ventilation: 13 l/min Autumn Hahn, RT

## 2021-01-03 NOTE — PROGRESS NOTES
Ventilator check complete; patient has a #7.5 ET tube secured at the 22 at the lip. Patient is sedated. Patient is not able to follow commands. Breath sounds are diminished. Trachea is midline, Negative for subcutaneous air, and chest excursion is symmetric. Patient is also Negative for cyanosis and is Negative for pitting edema. All alarms are set and audible. Resuscitation bag is at the head of the bed. Ventilator Settings Mode FIO2 Rate Tidal Volume Pressure PEEP I:E Ratio VC+  50 %    400 ml     12 cm H20  1:1.2 Peak airway pressure: 30 cm H2O Minute ventilation: 12 l/min ABG: No results for input(s): PH, PCO2, PO2, HCO3 in the last 72 hours.  
 
 
Florence Neely, RT

## 2021-01-03 NOTE — PROGRESS NOTES
Drew Rhodes Admission Date: 12/24/2020 Daily Progress Note: 1/3/2021 The patient's chart is reviewed and the patient is discussed with the staff. Patient has DM/CAD/HTN/CKD and presented with dyspnea. Saturation were 44% per EMS and in ER was placed on BIPAP since did not improve with NC. On 100% BIPAP and saturation only 93%. Recent covid exposure. Here the rapid Covid test was positive. ABG noted pH 7.31/41/70 on BIPAP 100%. CXR with diffuse infiltrates. Also noted worsening kidney function. Admitted to ICU 12/24. Given plasma and steroids. Creatinine too high for remdesivir. Nephrology consulted 12/26 for worsening renal failure. Subjective: Failed trial of PSV yesterday. Now off all sedation except precedex at 1. Not overbreathing vent. Temp low this AM and on Nubia hugger. Having diarrhea and laxatives stopped. Creatinine down. Current Facility-Administered Medications Medication Dose Route Frequency  dexmedeTOMidine (PRECEDEX) 400 mcg in 0.9% sodium chloride 100 mL infusion  0.1-1.5 mcg/kg/hr IntraVENous TITRATE  fentaNYL citrate (PF) injection 50 mcg  50 mcg IntraVENous Q2H PRN  
 bisacodyL (DULCOLAX) suppository 10 mg  10 mg Rectal DAILY PRN  
 insulin glargine (LANTUS) injection 20 Units  20 Units SubCUTAneous DAILY  polyethylene glycol (MIRALAX) packet 17 g  17 g Oral DAILY  [Held by provider] sodium zirconium cyclosilicate (LOKELMA) powder packet 5 g  5 g Oral DAILY  insulin lispro (HUMALOG) injection 0-15 Units  0-15 Units SubCUTAneous Q6H  
 senna-docusate (PERICOLACE) 8.6-50 mg per tablet 2 Tab  2 Tab Per NG tube DAILY  NUTRITIONAL SUPPORT ELECTROLYTE PRN ORDERS   Does Not Apply PRN  
 fentaNYL in normal saline (pf) 25 mcg/mL infusion  0-200 mcg/hr IntraVENous TITRATE  ascorbic acid (vitamin C) (VITAMIN C) tablet 1,000 mg  1,000 mg Per NG tube BID  
  cholecalciferol (VITAMIN D3) (1000 Units /25 mcg) tablet 5 Tab  5,000 Units Per NG tube DAILY  [Held by provider] hydrALAZINE (APRESOLINE) tablet 10 mg  10 mg Per NG tube TID  zinc sulfate tablet 220 mg  220 mg Per G Tube DAILY  famotidine (PF) (PEPCID) 20 mg in 0.9% sodium chloride 10 mL injection  20 mg IntraVENous DAILY  dextrose 40% (GLUTOSE) oral gel 1 Tube  15 g Oral PRN  
 glucagon (GLUCAGEN) injection 1 mg  1 mg IntraMUSCular PRN  
 dextrose (D50W) injection syrg 12.5-25 g  25-50 mL IntraVENous PRN  
 albuterol (PROVENTIL HFA, VENTOLIN HFA, PROAIR HFA) inhaler 2 Puff  2 Puff Inhalation Q4H PRN  
 hydrALAZINE (APRESOLINE) 20 mg/mL injection 20 mg  20 mg IntraVENous Q2H PRN  phenol throat spray (CHLORASEPTIC) 1 Spray  1 Spray Oral PRN  
 lip protectant (BLISTEX) ointment 1 Each  1 Each Topical PRN  
 acetaminophen (TYLENOL) tablet 650 mg  650 mg Oral Q6H PRN  
 0.9% sodium chloride infusion 250 mL  250 mL IntraVENous PRN  
 sodium chloride (NS) flush 5-40 mL  5-40 mL IntraVENous Q8H  
 sodium chloride (NS) flush 5-40 mL  5-40 mL IntraVENous PRN  
 heparin (porcine) injection 5,000 Units  5,000 Units SubCUTAneous Q8H  
 albuterol (PROVENTIL VENTOLIN) nebulizer solution 2.5 mg  2.5 mg Nebulization Q6H RT  
 
Review of Systems Intubated, sedated Objective:  
 
Vitals:  
 01/03/21 5186 01/03/21 0645 01/03/21 0700 01/03/21 0715 BP:   (!) 147/70 Pulse:  93 74 74 Resp:  (!) 42 17 29 Temp:      
SpO2:  98% 100% 100% Weight: 162 lb 7.7 oz (73.7 kg) Height:      
 
Intake and Output:  
01/01 1901 - 01/03 0700 In: 1882.8 [I.V.:146.8] Out: 1275 [FKGVF:[id number]] No intake/output data recorded. Physical Exam:  
Constitution:  the patient is elderly, sedated and in no acute distress EENMT:  Sclera clear, pupils equal, oral mucosa moist 
Respiratory: few scattered crackles Cardiovascular:  RRR without M,G,R 
 Gastrointestinal: soft and non-tender; with positive bowel sounds. Musculoskeletal: warm without cyanosis. There is no lower leg edema. Skin:  no jaundice or rashes, no wounds Neurologic: no gross neuro deficits Psychiatric:  Calm sedated Drips: 
 
Precedex 1 CHEST XRAY:  
 
1/3, 12/31: 
 
 
 
 
 
 
 
CXR Results  (Last 48 hours) 01/03/21 0520  XR CHEST PORT Final result Impression:  IMPRESSION:  
   
1. Bilateral groundglass/interstitial densities, likely pulmonary edema or  
pneumonia. 2. No significant change compared to prior exam.  
  
 Narrative:  Portable chest xray COMPARISON: December 31, 2020 CLINICAL HISTORY: Respiratory failure, follow-up. FINDINGS:  
   
Endotracheal tube, enteric tube and left-sided IJ line are stable. There are  
bilateral groundglass densities. There may be small effusions. Heart is  
enlarged. Mediastinal contour is within normal limits. LAB No lab exists for component: [place] Recent Labs 01/02/21 
8137 01/01/21 
0216 WBC 12.0* 10.8 HGB 8.1* 10.3* HCT 26.5* 32.9*  
 206 Recent Labs 01/03/21 
2447 01/02/21 
1485 01/01/21 
0459 01/01/21 
0136 * 148* 144 143  
K 4.3 4.2 5.2* 5.4*  
* 117* 115* 115* CO2 25 24 22 22 * 106* 252* 219* * 100* 89* 90* CREA 2.96* 3.30* 3.25* 3.29* MG  --   --  3.0* 3.0*  
CA 8.4 8.0* 8.1* 8.4 PHOS  --   --  5.4* 5.7* ABG:   
Lab Results Component Value Date/Time PHI 7.41 01/03/2021 03:10 AM  
 PCO2I 34.5 (L) 01/03/2021 03:10 AM  
 PO2I 84 01/03/2021 03:10 AM  
 HCO3I 21.7 (L) 01/03/2021 03:10 AM  
 FIO2I 50 01/03/2021 03:10 AM  
 
MICRO 
SARS-CoV-2 LAB Results [facility] Test: No results found for: COV2NT  
[facility] Test: No results found for: [facility] Test: No components found for: [id number] Urine - 50-100k CFU e coli. 10-50 CFU skin farheen. CRP: 2.8 BNP 1536 PCT: 0.62 Echo: LEFT VENTRICLE: Size was normal. Systolic function was normal. Ejection 
fraction was estimated in the range of 60 % to 65 %. There were no regional 
wall motion abnormalities. There was mild concentric hypertrophy. The E/e' 
ratio was 20.75. There was diastolic dysfunction. 
  
RIGHT VENTRICLE: The size was normal. Systolic function was normal. Estimated 
peak pressure was in the range of 45-50 mmHg. 
  
LEFT ATRIUM: The atrium was mildly dilated. 
  
RIGHT ATRIUM: Size was normal. 
  
SYSTEMIC VEINS: IVC: The inferior vena cava was normal in size. Unable to 
assess phasicity due to patient being on vent. 
  
AORTIC VALVE: The valve was trileaflet. Leaflets exhibited mild sclerosis. There was no evidence for stenosis. There was no insufficiency. 
  
MITRAL VALVE: Valve structure was normal. There was no evidence for stenosis. There was trivial regurgitation. 
  
TRICUSPID VALVE: The valve structure was normal. There was no evidence for 
stenosis. There was mild to moderate regurgitation. 
  
PULMONIC VALVE: The valve structure was normal. There was no evidence for 
stenosis. There was no insufficiency. 
  
PERICARDIUM: There was no pericardial effusion. 
  
AORTA: The root exhibited normal size. Assessment:  (Medical Decision Making) Hospital Problems  Date Reviewed: 8/10/2019 Codes Class Noted POA Cardiac arrest [facility]) ICD-10-CM: I46.9 ICD-9-CM: 427.5  12/30/2020 Unknown Only lasted about 10 seconds on monitor but desat into 20's came right back. Echo 71120 Viktoria Molina Acute cystitis without hematuria ICD-10-CM: N30.00 ICD-9-CM: 595.0  12/29/2020 Unknown Rocphin completed. COVID-19 ICD-10-CM: U07.1 ICD-9-CM: 079.89  12/24/2020 Unknown Ongoing. Completed decadron yesterday * (Principal) Acute respiratory distress syndrome (ARDS) due to severe acute respiratory syndrome coronavirus 2 (SARS-CoV-2) ([facility]) ICD-10-CM: U07.1, J80 
ICD-9-CM: 518.82, 079.89  12/24/2020 Unknown CXR with possibly some congestion. Acute hypoxemic respiratory failure ([facility] [address] ICD-10-CM: J96.01 
ICD-9-CM: 518.81  12/24/2020 Unknown Oxygenation adequate on 50% Acute on chronic renal failure (HCC) ICD-10-CM: N17.9, N18.9 ICD-9-CM: 584.9, 585.9  9/17/2019 Unknown Cr better Coronary artery disease involving coronary bypass graft of native heart without angina pectoris (Chronic) ICD-10-CM: Z11.095 ICD-9-CM: 414.05  6/15/2015 Yes Uncontrolled type 2 diabetes mellitus with hyperglycemia (HCC) (Chronic) ICD-10-CM: E11.65 ICD-9-CM: 250.02  6/15/2015 Yes Resume lantus once daily. Diabetes mellitus with hyperosmolarity without hyperglycemic hyperosmolar nonketotic coma (HCC) (Chronic) ICD-10-CM: E11.00 ICD-9-CM: 250.20  11/24/2014 Yes Expect BS to improve now that off decadron Patient with ARDS, severe acute hypoxic due to COVID PNA. Plan:  (Medical Decision Making) Wean precedex. Decrease rate then try on PSV to achieve volumes close to 6ml/kg IBW. Lasix x 1 today. Increase free water due to GI losses. Cont H2B. Hold laxatives Cont TF. Finished rocephin for UTI. Called daughter Prosper Coburn with update [phone]) Critically ill. E/M 31 minutes Skyler Herrera MD

## 2021-01-03 NOTE — PROGRESS NOTES
[facility] PROGRESS NOTE Follow up for: PRATIMA Subjective:  
Patient seen and examined in Health system ICU remains intubated Labs and chart reviewed- renal function worsening, non oliguric ROS: 
Unable to obtain Objective:  
Exam: 
Vitals:  
 01/03/21 1036 01/03/21 0645 01/03/21 0700 01/03/21 0715 BP:   (!) 147/70 Pulse:  93 74 74 Resp:  (!) 42 17 29 Temp:      
SpO2:  98% 100% 100% Weight: 73.7 kg (162 lb 7.7 oz) Height:      
 
 
 
Intake/Output Summary (Last 24 hours) at 1/3/2021 0957 Last data filed at 1/3/2021 0600 Gross per 24 hour Intake 1408.77 ml Output 975 ml Net 433.77 ml  
 
 
Current Facility-Administered Medications Medication Dose Route Frequency  dexmedeTOMidine (PRECEDEX) 400 mcg in 0.9% sodium chloride 100 mL infusion  0.1-1.5 mcg/kg/hr IntraVENous TITRATE  fentaNYL citrate (PF) injection 50 mcg  50 mcg IntraVENous Q2H PRN  
 bisacodyL (DULCOLAX) suppository 10 mg  10 mg Rectal DAILY PRN  
 insulin glargine (LANTUS) injection 20 Units  20 Units SubCUTAneous DAILY  polyethylene glycol (MIRALAX) packet 17 g  17 g Oral DAILY  [Held by provider] sodium zirconium cyclosilicate (LOKELMA) powder packet 5 g  5 g Oral DAILY  insulin lispro (HUMALOG) injection 0-15 Units  0-15 Units SubCUTAneous Q6H  
 senna-docusate (PERICOLACE) 8.6-50 mg per tablet 2 Tab  2 Tab Per NG tube DAILY  NUTRITIONAL SUPPORT ELECTROLYTE PRN ORDERS   Does Not Apply PRN  
 fentaNYL in normal saline (pf) 25 mcg/mL infusion  0-200 mcg/hr IntraVENous TITRATE  ascorbic acid (vitamin C) (VITAMIN C) tablet 1,000 mg  1,000 mg Per NG tube BID  cholecalciferol (VITAMIN D3) (1000 Units /25 mcg) tablet 5 Tab  5,000 Units Per NG tube DAILY  [Held by provider] hydrALAZINE (APRESOLINE) tablet 10 mg  10 mg Per NG tube TID  zinc sulfate tablet 220 mg  220 mg Per G Tube DAILY  famotidine (PF) (PEPCID) 20 mg in 0.9% sodium chloride 10 mL injection  20 mg IntraVENous DAILY  dextrose 40% (GLUTOSE) oral gel 1 Tube  15 g Oral PRN  
 glucagon (GLUCAGEN) injection 1 mg  1 mg IntraMUSCular PRN  
 dextrose (D50W) injection syrg 12.5-25 g  25-50 mL IntraVENous PRN  
 albuterol (PROVENTIL HFA, VENTOLIN HFA, PROAIR HFA) inhaler 2 Puff  2 Puff Inhalation Q4H PRN  
 hydrALAZINE (APRESOLINE) 20 mg/mL injection 20 mg  20 mg IntraVENous Q2H PRN  phenol throat spray (CHLORASEPTIC) 1 Spray  1 Spray Oral PRN  
 lip protectant (BLISTEX) ointment 1 Each  1 Each Topical PRN  
 acetaminophen (TYLENOL) tablet 650 mg  650 mg Oral Q6H PRN  
 0.9% sodium chloride infusion 250 mL  250 mL IntraVENous PRN  
 sodium chloride (NS) flush 5-40 mL  5-40 mL IntraVENous Q8H  
 sodium chloride (NS) flush 5-40 mL  5-40 mL IntraVENous PRN  
 heparin (porcine) injection 5,000 Units  5,000 Units SubCUTAneous Q8H  
 albuterol (PROVENTIL VENTOLIN) nebulizer solution 2.5 mg  2.5 mg Nebulization Q6H RT  
 
 
EXAM Seen through window GEN - intubated, sedated on vent in no distress CV - regular on monitor Lung - symmetric expansion Recent Labs 01/02/21 
7581 01/01/21 
0216 WBC 12.0* 10.8 HGB 8.1* 10.3* HCT 26.5* 32.9*  
 206 Recent Labs 01/03/21 
5183 01/02/21 
7264 01/01/21 
0459 01/01/21 
0136 * 148* 144 143  
K 4.3 4.2 5.2* 5.4*  
* 117* 115* 115* CO2 25 24 22 22 * 100* 89* 90* CREA 2.96* 3.30* 3.25* 3.29* CA 8.4 8.0* 8.1* 8.4 * 106* 252* 219* MG  --   --  3.0* 3.0*  
PHOS  --   --  5.4* 5.7* Assessment and Plan: 1. PRATIMA over CKD Possibly COVID ATN , non oliguric Baseline 1 to 1.5 , admitted with Cr of 2.8 on 12/24  
-Echo from 2019 - Normal EF and systolic function 
- Continued stable kidney function. Off Lokelma, potassium stable. 2. Hyperkalemia- resolved 3. Hypernatremia - adjust free water in TF 
  
4. COVID 19 + Mal Sage MD

## 2021-01-03 NOTE — PROGRESS NOTES
am 
1/3/2021 7:20 AM 
 
Admit Date: 12/24/2020 Admit Diagnosis: Acute respiratory distress syndrome (ARDS) due to severe acute respiratory syndrome coronavirus 2 (SARS-CoV-2) ([facility]) [U07.1, J80];COVID-19 [U07.1] Subjective:  
 Patient continues on presidex and remains intubated. HR this am NSR 73. Some rates in the 50s Objective:  
  
Visit Vitals BP (!) 172/80 Pulse (!) 50 Temp 97.5 °F (36.4 °C) Resp 30 Ht 5' 5\" (1.651 m) Wt 162 lb 7.7 oz (73.7 kg) SpO2 99% BMI 27.04 kg/m² ROS:  intubated Physical Exam: 
 
Physical Examination:  
General appearance - intubated Mental status - sedated Chest/CV - clear to auscultation, no wheezes, rales or rhonchi, symmetric air entry Heart - normal rate, regular rhythm, normal S1, S2, no murmurs, rubs, clicks or gallops Abdomen/GI - soft, nontender, nondistended, no masses or organomegaly Musculoskeletal - no joint tenderness, deformity or swelling Extremities - peripheral pulses normal, no pedal edema, no clubbing or cyanosis Skin - normal coloration and turgor, no rashes, no suspicious skin lesions noted Current Facility-Administered Medications Medication Dose Route Frequency  dexmedeTOMidine (PRECEDEX) 400 mcg in 0.9% sodium chloride 100 mL infusion  0.1-1.5 mcg/kg/hr IntraVENous TITRATE  fentaNYL citrate (PF) injection 50 mcg  50 mcg IntraVENous Q2H PRN  
 bisacodyL (DULCOLAX) suppository 10 mg  10 mg Rectal DAILY PRN  
 insulin glargine (LANTUS) injection 20 Units  20 Units SubCUTAneous DAILY  polyethylene glycol (MIRALAX) packet 17 g  17 g Oral DAILY  [Held by provider] sodium zirconium cyclosilicate (LOKELMA) powder packet 5 g  5 g Oral DAILY  insulin lispro (HUMALOG) injection 0-15 Units  0-15 Units SubCUTAneous Q6H  
 senna-docusate (PERICOLACE) 8.6-50 mg per tablet 2 Tab  2 Tab Per NG tube DAILY  NUTRITIONAL SUPPORT ELECTROLYTE PRN ORDERS   Does Not Apply PRN  
  fentaNYL in normal saline (pf) 25 mcg/mL infusion  0-200 mcg/hr IntraVENous TITRATE  ascorbic acid (vitamin C) (VITAMIN C) tablet 1,000 mg  1,000 mg Per NG tube BID  cholecalciferol (VITAMIN D3) (1000 Units /25 mcg) tablet 5 Tab  5,000 Units Per NG tube DAILY  [Held by provider] hydrALAZINE (APRESOLINE) tablet 10 mg  10 mg Per NG tube TID  zinc sulfate tablet 220 mg  220 mg Per G Tube DAILY  famotidine (PF) (PEPCID) 20 mg in 0.9% sodium chloride 10 mL injection  20 mg IntraVENous DAILY  dextrose 40% (GLUTOSE) oral gel 1 Tube  15 g Oral PRN  
 glucagon (GLUCAGEN) injection 1 mg  1 mg IntraMUSCular PRN  
 dextrose (D50W) injection syrg 12.5-25 g  25-50 mL IntraVENous PRN  
 albuterol (PROVENTIL HFA, VENTOLIN HFA, PROAIR HFA) inhaler 2 Puff  2 Puff Inhalation Q4H PRN  
 hydrALAZINE (APRESOLINE) 20 mg/mL injection 20 mg  20 mg IntraVENous Q2H PRN  phenol throat spray (CHLORASEPTIC) 1 Spray  1 Spray Oral PRN  
 lip protectant (BLISTEX) ointment 1 Each  1 Each Topical PRN  
 acetaminophen (TYLENOL) tablet 650 mg  650 mg Oral Q6H PRN  
 0.9% sodium chloride infusion 250 mL  250 mL IntraVENous PRN  
 sodium chloride (NS) flush 5-40 mL  5-40 mL IntraVENous Q8H  
 sodium chloride (NS) flush 5-40 mL  5-40 mL IntraVENous PRN  
 heparin (porcine) injection 5,000 Units  5,000 Units SubCUTAneous Q8H  
 albuterol (PROVENTIL VENTOLIN) nebulizer solution 2.5 mg  2.5 mg Nebulization Q6H RT  
 
 
Data Review:  
@LABRCNT(Na,K,BUN,CREA,WBC,HGB,HCT,PLT,INR,TRP,TCHOL*,Triglyceride*,LDL*,LDLCPOC HDL*,HDL])@ TELEMETRY: nsr Assessment/Plan:  
 
Principal Problem: 
  Acute respiratory distress syndrome (ARDS) due to severe acute respiratory syndrome coronavirus 2 (SARS-CoV-2) ([facility] (12/24/2020) Active Problems: 
  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 Has underlying nsr so there is no role for any cardiac intervention.  
 
 
Chacho Olea MD

## 2021-01-03 NOTE — PROGRESS NOTES
Ventilator check complete; patient has a #7.5 ET tube secured at the 22 at the lip. Patient is sedated. Patient is not able to follow commands. Breath sounds are coarse and diminished. Trachea is midline, Negative for subcutaneous air, and chest excursion is symmetric. Patient is also Negative for cyanosis and is Negative for pitting edema. All alarms are set and audible. Resuscitation bag is at the head of the bed. Ventilator Settings Mode FIO2 Rate Tidal Volume Pressure PEEP I:E Ratio VC+  50 % 30  400 ml     12 cm H20  1:1.2 Peak airway pressure: 32 cm H2O Minute ventilation: 14.6 l/min Ky Eddy, RT

## 2021-01-03 NOTE — PROGRESS NOTES
Problem: Falls - Risk of 
Goal: *Absence of Falls Description: Document Danne Lobe Fall Risk and appropriate interventions in the flowsheet. Outcome: Progressing Towards Goal 
Note: Fall Risk Interventions: 
Mobility Interventions: Bed/chair exit alarm Mentation Interventions: Adequate sleep, hydration, pain control, Bed/chair exit alarm, More frequent rounding Medication Interventions: Evaluate medications/consider consulting pharmacy Elimination Interventions: Bed/chair exit alarm, Call light in reach Problem: Patient Education: Go to Patient Education Activity Goal: Patient/Family Education Outcome: Progressing Towards Goal 
  
Problem: Pressure Injury - Risk of 
Goal: *Prevention of pressure injury Description: Document Ashu Scale and appropriate interventions in the flowsheet. Outcome: Progressing Towards Goal 
Note: Pressure Injury Interventions: 
Sensory Interventions: Assess changes in LOC, Avoid rigorous massage over bony prominences, Check visual cues for pain, Float heels, Keep linens dry and wrinkle-free, Minimize linen layers, Monitor skin under medical devices, Pad between skin to skin, Turn and reposition approx. every two hours (pillows and wedges if needed) Moisture Interventions: Absorbent underpads, Apply protective barrier, creams and emollients Activity Interventions: Pressure redistribution bed/mattress(bed type) Mobility Interventions: Pressure redistribution bed/mattress (bed type) Nutrition Interventions: Document food/fluid/supplement intake Friction and Shear Interventions: Apply protective barrier, creams and emollients, Foam dressings/transparent film/skin sealants, HOB 30 degrees or less, Lift sheet, Lift team/patient mobility team, Minimize layers Problem: Patient Education: Go to Patient Education Activity Goal: Patient/Family Education Outcome: Progressing Towards Goal 
  
Problem: Diabetes Self-Management Goal: *Disease process and treatment process Description: Define diabetes and identify own type of diabetes; list 3 options for treating diabetes. Outcome: Progressing Towards Goal 
Goal: *Incorporating nutritional management into lifestyle Description: Describe effect of type, amount and timing of food on blood glucose; list 3 methods for planning meals. Outcome: Progressing Towards Goal 
Goal: *Incorporating physical activity into lifestyle Description: State effect of exercise on blood glucose levels. Outcome: Progressing Towards Goal 
Goal: *Developing strategies to promote health/change behavior Description: Define the ABC's of diabetes; identify appropriate screenings, schedule and personal plan for screenings. Outcome: Progressing Towards Goal 
Goal: *Using medications safely Description: State effect of diabetes medications on diabetes; name diabetes medication taking, action and side effects. Outcome: Progressing Towards Goal 
Goal: *Monitoring blood glucose, interpreting and using results Description: Identify recommended blood glucose targets  and personal targets. Outcome: Progressing Towards Goal 
Goal: *Prevention, detection, treatment of acute complications Description: List symptoms of hyper- and hypoglycemia; describe how to treat low blood sugar and actions for lowering  high blood glucose level. Outcome: Progressing Towards Goal 
Goal: *Prevention, detection and treatment of chronic complications Description: Define the natural course of diabetes and describe the relationship of blood glucose levels to long term complications of diabetes. Outcome: Progressing Towards Goal 
Goal: *Developing strategies to address psychosocial issues Description: Describe feelings about living with diabetes; identify support needed and support network Outcome: Progressing Towards Goal 
Goal: *Insulin pump training Outcome: Progressing Towards Goal 
Goal: *Sick day guidelines Outcome: Progressing Towards Goal 
Goal: *Patient Specific Goal (EDIT GOAL, INSERT TEXT) Outcome: Progressing Towards Goal 
  
Problem: Patient Education: Go to Patient Education Activity Goal: Patient/Family Education Outcome: Progressing Towards Goal 
  
Problem: Delirium Treatment Goal: *Level of consciousness restored to baseline Outcome: Progressing Towards Goal 
Goal: *Level of environmental perceptions restored to baseline Outcome: Progressing Towards Goal 
Goal: *Sensory perception restored to baseline Outcome: Progressing Towards Goal 
Goal: *Emotional stability restored to baseline Outcome: Progressing Towards Goal 
Goal: *Functional assessment restored to baseline Outcome: Progressing Towards Goal 
Goal: *Absence of falls Outcome: Progressing Towards Goal 
Goal: *Will remain free of delirium, CAM Score negative Outcome: Progressing Towards Goal 
Goal: *Cognitive status will be restored to baseline Outcome: Progressing Towards Goal 
Goal: Interventions Outcome: Progressing Towards Goal 
  
Problem: Patient Education: Go to Patient Education Activity Goal: Patient/Family Education Outcome: Progressing Towards Goal 
  
Problem: Non-Violent Restraints Goal: *Removal from restraints as soon as assessed to be safe Outcome: Progressing Towards Goal 
Goal: *No harm/injury to patient while restraints in use Outcome: Progressing Towards Goal 
Goal: *Patient's dignity will be maintained Outcome: Progressing Towards Goal 
Goal: *Patient Specific Goal (EDIT GOAL, INSERT TEXT) Outcome: Progressing Towards Goal 
Goal: Non-violent Restaints:Standard Interventions Outcome: Progressing Towards Goal 
Goal: Non-violent Restraints:Patient Interventions Outcome: Progressing Towards Goal 
Goal: Patient/Family Education Outcome: Progressing Towards Goal 
  
Problem: Ventilator Management Goal: *Adequate oxygenation and ventilation Outcome: Progressing Towards Goal 
 Goal: *Patient maintains clear airway/free of aspiration Outcome: Progressing Towards Goal 
Goal: *Absence of infection signs and symptoms Outcome: Progressing Towards Goal 
Goal: *Normal spontaneous ventilation Outcome: Progressing Towards Goal 
  
Problem: Patient Education: Go to Patient Education Activity Goal: Patient/Family Education Outcome: Progressing Towards Goal

## 2021-01-04 NOTE — PROGRESS NOTES
Comprehensive Nutrition Assessment This assessment was completed remotely. Type and Reason for Visit: Cassy Alvarado Tube Feeding Management (Pulmonary) Nutrition Recommendations/Plan:  
· Continue to hold bowel regimen. Note pt still is on fentanyl drip · Enteral Nutrition: · Change TF via OGT to Vital AF 1.2 at 45 ml/hr. Continue  water flush of 300 ml every 4 hours.  This will provide 1296 kcal or total of 1589 kcal with Diprivan (100% estimated calorie needs), 81 grams protein (110% estimated protein needs) , 2675 ml free fluid per day. · IV Fluids: IVF per MD 
· Vitamin and Mineral Supplement Therapy: · Electrolyte management replacement protocol implemented. · Labs:  
· EN labs: BMP daily, Mg and Phos MWF Malnutrition Assessment: 
Malnutrition Status: Insufficient data Nutrition Assessment:  
Nutrition History: 3 recorded meals in past 6 days with average intake of 83% Nutrition Background: H/O: CAD, HTN, DM, HLD, peripheral neuropathy, CKD stage III. Presented with dyspnea. Findings of +COVID. Admitted to ICU with ARDS, severe acute hypoxic due to COVID PNA, PRATIMA on CKD. Was requiring BIPAP at night and Aviro during day. Had brief cardiac arrest and was intubated 12/30 Daily Update: 
Remains on vent. TF reached gaol of 35 ml/hr (Nepro) at 0400 1/2/2021. 1 BM 1/2. FMS placed 1/3, 2 BM\"s and then 200 ml of output. Received dulcolax suppository 1/1, miralax 1/1 and 1/2, recevied periocolace 11/2 and 1/2. RN reports pt had large amount of liquid stool yesterday prompting placement of FMS. Output is lower today and if remains low, plan is to remove FMS. Abdominal Status (last documented): Intact, Diarrhea, Soft abdomen with Active  bowel sounds. Last BM 01/03/21. Pertinent Medications: Vitamin C,dulcalax suppository prn added 1/1/2021 and dose given, vitamin D, precedex, pepcid,decadron stopped 1/2/2021, fentanyl drip, Lantus (20 units once daily resumed 1/1/2021), SSI (21 units yesterday and 18 units thus far today), diprivan (currently a 11.1 ml/hr), lokelma held starting 1/3/2021, zinc 
IVF: D5 at 50 ml/hr Pertinent Labs:  
Lab Results Component Value Date/Time Sodium 148 (H) 01/04/2021 06:28 AM  
 Potassium 3.8 01/04/2021 06:28 AM  
 Chloride 117 (H) 01/04/2021 06:28 AM  
 CO2 23 01/04/2021 06:28 AM  
 Anion gap 8 01/04/2021 06:28 AM  
 Glucose 201 (H) 01/04/2021 06:28 AM  
 BUN 89 (H) 01/04/2021 06:28 AM  
 Creatinine 2.42 (H) 01/04/2021 06:28 AM  
 Calcium 8.2 (L) 01/04/2021 06:28 AM  
 Albumin 2.3 (L) 12/31/2020 04:11 AM  
 Phosphorus 3.5 01/04/2021 06:28 AM  
Mg 2.9 Water via OGT increased from 120 ml every 4 hrs to 240 ml every 4 hrs on 1/3/2021 and then to 300 ml every 4 hrs 1/4/2021 POC glucoses: 226-453-dcbf 2 days. No hx of DM. Jaye improve without steroids and once D5W is able to be stopped. Could change to non K restricted formula. Nutrition Related Findings:  
NFPE deferred d/t isolation. Intubated and OGT placed 12/30. Current Nutrition Therapies: 
Current Tube Feeding (TF) Orders: · Feeding Route: Orogastric · Formula: Nepro · Schedule:Continuous · Regimen: 35 ml/hr · Additives/Modulars: (None) · Water Flushes: 300 ml every 4 hrs · Current TF & Flush Orders Provides: at goal 
· Goal TF & Flush Orders Provides: 1512 kcal (100% estimated calorie needs), 68 grams protein (100% estimated protein needs) , 2413 ml free fluid per day. Current Intake: Average Meal Intake: NPO   Additional Caloric Sources: Potential for 293 kcal/d from Diprivan Therefore would benefit from decreased kcal from TF Anthropometric Measures: 
Height: 5' 5\" (165.1 cm) Current Body Wt: 74.4 kg (164 lb 0.4 oz), Weight source: Bed scale BMI: 27.3, Overweight (BMI 25.0-29. 9) Admission Body Weight: 159 lb 2.8 oz(Bedscale) Ideal Body Wt: 125 lbs (57 kg), 123.8 % Usual Body Wt: 73.5 kg (162 lb)(Per EMR 11/5/82020), Percent weight change: -4.5 Estimated Daily Nutrient Needs: 
Energy (kcal/day): 0437-1540 kcal/d (Kcal/kg(25-30), Weight Used: Ideal(57 kg)) Protein (g/day): 57-74 grams/d (1-1.3 grams/kg) Weight Used: (Ideal) Fluid (ml/day):   (1 ml/kcal) Nutrition Diagnosis:  
· Inadequate oral intake related to impaired respiratory function as evidenced by (intubated, NPO, TF to meet needs) Nutrition Interventions:  
Food and/or Nutrient Delivery: Continue NPO, Modify tube feeding Coordination of Nutrition Care: Continue to monitor while inpatient Plan of Care discussed with Lizy Slaughter RN Goals: Previous Goal Met: Goal(s) achieved Active Goal: Intake >50% of needs within 5 days Nutrition Monitoring and Evaluation:  
  
Food/Nutrient Intake Outcomes: Enteral nutrition intake/tolerance Physical Signs/Symptoms Outcomes: Hemodynamic status, Biochemical data, GI status Discharge Planning: Too soon to determine Raegan Alcantar, [address] [place] [address], [facility] [address] [place], [address] [place] [address] Disaster Mode active

## 2021-01-04 NOTE — PROGRESS NOTES
Anthony Yan Admission Date: 12/24/2020 Daily Progress Note: 1/4/2021 The patient's chart is reviewed and the patient is discussed with the staff. Patient has DM/CAD/HTN/CKD and presented with dyspnea. Saturation were 44% per EMS and in ER was placed on BIPAP since did not improve with NC. On 100% BIPAP and saturation only 93%. Recent covid exposure. Here the rapid Covid test was positive. ABG noted pH 7.31/41/70 on BIPAP 100%. CXR with diffuse infiltrates. Also noted worsening kidney function. Admitted to ICU 12/24. Given plasma and steroids. Creatinine too high for remdesivir. Nephrology consulted 12/26 for worsening renal failure. Subjective: Failed trial of PSV . Now off all sedation except precedex at 1.5 and is very agitayed. Not overbreathing vent. less diarrhea today She is on PEEP 12 cm and FIO2 60% Current Facility-Administered Medications Medication Dose Route Frequency  propofol (DIPRIVAN) 10 mg/mL infusion  5 mcg/kg/min IntraVENous TITRATE  dextrose 5% infusion  50 mL/hr IntraVENous CONTINUOUS  
 dexmedeTOMidine (PRECEDEX) 400 mcg in 0.9% sodium chloride 100 mL infusion  0.1-1.5 mcg/kg/hr IntraVENous TITRATE  fentaNYL citrate (PF) injection 50 mcg  50 mcg IntraVENous Q2H PRN  
 bisacodyL (DULCOLAX) suppository 10 mg  10 mg Rectal DAILY PRN  
 insulin glargine (LANTUS) injection 20 Units  20 Units SubCUTAneous DAILY  [Held by provider] polyethylene glycol (MIRALAX) packet 17 g  17 g Oral DAILY  [Held by provider] sodium zirconium cyclosilicate (LOKELMA) powder packet 5 g  5 g Oral DAILY  insulin lispro (HUMALOG) injection 0-15 Units  0-15 Units SubCUTAneous Q6H  
 [Held by provider] senna-docusate (PERICOLACE) 8.6-50 mg per tablet 2 Tab  2 Tab Per NG tube DAILY  NUTRITIONAL SUPPORT ELECTROLYTE PRN ORDERS   Does Not Apply PRN  
 fentaNYL in normal saline (pf) 25 mcg/mL infusion  0-200 mcg/hr IntraVENous TITRATE  ascorbic acid (vitamin C) (VITAMIN C) tablet 1,000 mg  1,000 mg Per NG tube BID  cholecalciferol (VITAMIN D3) (1000 Units /25 mcg) tablet 5 Tab  5,000 Units Per NG tube DAILY  [Held by provider] hydrALAZINE (APRESOLINE) tablet 10 mg  10 mg Per NG tube TID  zinc sulfate tablet 220 mg  220 mg Per G Tube DAILY  famotidine (PF) (PEPCID) 20 mg in 0.9% sodium chloride 10 mL injection  20 mg IntraVENous DAILY  dextrose 40% (GLUTOSE) oral gel 1 Tube  15 g Oral PRN  
 glucagon (GLUCAGEN) injection 1 mg  1 mg IntraMUSCular PRN  
 dextrose (D50W) injection syrg 12.5-25 g  25-50 mL IntraVENous PRN  
 albuterol (PROVENTIL HFA, VENTOLIN HFA, PROAIR HFA) inhaler 2 Puff  2 Puff Inhalation Q4H PRN  
 hydrALAZINE (APRESOLINE) 20 mg/mL injection 20 mg  20 mg IntraVENous Q2H PRN  phenol throat spray (CHLORASEPTIC) 1 Spray  1 Spray Oral PRN  
 lip protectant (BLISTEX) ointment 1 Each  1 Each Topical PRN  
 acetaminophen (TYLENOL) tablet 650 mg  650 mg Oral Q6H PRN  
 0.9% sodium chloride infusion 250 mL  250 mL IntraVENous PRN  
 sodium chloride (NS) flush 5-40 mL  5-40 mL IntraVENous Q8H  
 sodium chloride (NS) flush 5-40 mL  5-40 mL IntraVENous PRN  
 heparin (porcine) injection 5,000 Units  5,000 Units SubCUTAneous Q8H  
 albuterol (PROVENTIL VENTOLIN) nebulizer solution 2.5 mg  2.5 mg Nebulization Q6H RT  
 
Review of Systems Intubated, sedated Objective:  
 
Vitals:  
 01/04/21 0929 01/04/21 1000 01/04/21 1029 01/04/21 1100 BP: (!) 156/80 (!) 164/96 (!) 157/82 (!) 161/85 Pulse: 90 (!) 101 89 92 Resp: (!) 37 (!) 43 (!) 31 (!) 38 Temp:      
SpO2: 95% (!) 82% 94% (!) 89% Weight:      
Height:      
 
Intake and Output:  
01/02 1901 - 01/04 0700 In: 3195.8 [I.V.:712.8] Out: 0365 [Urine:2390; Drains:200] 01/04 0701 - 01/04 1900 In: -  
Out: 100 [Urine:100] Physical Exam:  
Constitution:  the patient is elderly, sedated and in no acute distress EENMT:  Sclera clear, pupils equal, oral mucosa moist 
Respiratory: few scattered crackles Cardiovascular:  RRR without M,G,R 
Gastrointestinal: soft and non-tender; with positive bowel sounds. Musculoskeletal: warm without cyanosis. There is no lower leg edema. Skin:  no jaundice or rashes, no wounds Neurologic: no gross neuro deficits Psychiatric:  Calm sedated Drips: 
 
Precedex 1.5 CHEST XRAY:  
 
1/3, 12/31: 
 
 
 
 
 
 
 
CXR Results  (Last 48 hours) 01/03/21 0520  XR CHEST PORT Final result Impression:  IMPRESSION:  
   
1. Bilateral groundglass/interstitial densities, likely pulmonary edema or  
pneumonia. 2. No significant change compared to prior exam.  
  
 Narrative:  Portable chest xray COMPARISON: December 31, 2020 CLINICAL HISTORY: Respiratory failure, follow-up. FINDINGS:  
   
Endotracheal tube, enteric tube and left-sided IJ line are stable. There are  
bilateral groundglass densities. There may be small effusions. Heart is  
enlarged. Mediastinal contour is within normal limits. LAB No lab exists for component: [place] Recent Labs 01/02/21 
8496 WBC 12.0* HGB 8.1* HCT 26.5*  
 Recent Labs 01/04/21 
5612 01/03/21 
5659 01/02/21 
4139 * 148* 148* K 3.8 4.3 4.2 * 117* 117* CO2 23 25 24 * 175* 106* BUN 89* 109* 100* CREA 2.42* 2.96* 3.30* MG 2.9*  --   --   
CA 8.2* 8.4 8.0*  
PHOS 3.5  --   --   
 
ABG:   
Lab Results Component Value Date/Time PHI 7.31 (L) 01/04/2021 03:27 AM  
 PCO2I 40.7 01/04/2021 03:27 AM  
 PO2I 213 (H) 01/04/2021 03:27 AM  
 HCO3I 20.4 (L) 01/04/2021 03:27 AM  
 FIO2I 50 01/04/2021 03:27 AM  
 
 
Ventilator Settings Mode FIO2 Rate Tidal Volume Pressure PEEP Assist control, VC+  60 %    400 ml  8 cm H2O  12 cm H20 Peak airway pressure: 28 cm H2O Minute ventilation: 13.7 l/min MICRO 
SARS-CoV-2 LAB Results [facility] Test: No results found for: COV2NT  
[facility] Test: No results found for: [facility] Test: No components found for: [id number] Urine - 50-100k CFU e coli. 10-50 CFU skin farheen. CRP: 2.8 BNP 1536 PCT: 0.62 Echo: LEFT VENTRICLE: Size was normal. Systolic function was normal. Ejection 
fraction was estimated in the range of 60 % to 65 %. There were no regional 
wall motion abnormalities. There was mild concentric hypertrophy. The E/e' 
ratio was 20.75. There was diastolic dysfunction. 
  
RIGHT VENTRICLE: The size was normal. Systolic function was normal. Estimated 
peak pressure was in the range of 45-50 mmHg. 
  
LEFT ATRIUM: The atrium was mildly dilated. 
  
RIGHT ATRIUM: Size was normal. 
  
SYSTEMIC VEINS: IVC: The inferior vena cava was normal in size. Unable to 
assess phasicity due to patient being on vent. 
  
AORTIC VALVE: The valve was trileaflet. Leaflets exhibited mild sclerosis. There was no evidence for stenosis. There was no insufficiency. 
  
MITRAL VALVE: Valve structure was normal. There was no evidence for stenosis. There was trivial regurgitation. 
  
TRICUSPID VALVE: The valve structure was normal. There was no evidence for 
stenosis. There was mild to moderate regurgitation. 
  
PULMONIC VALVE: The valve structure was normal. There was no evidence for 
stenosis. There was no insufficiency. 
  
PERICARDIUM: There was no pericardial effusion. 
  
AORTA: The root exhibited normal size. Assessment:  (Medical Decision Making) Hospital Problems  Date Reviewed: 8/10/2019 Codes Class Noted POA Cardiac arrest [facility]) ICD-10-CM: I46.9 ICD-9-CM: 427.5  12/30/2020 Unknown Only lasted about 10 seconds on monitor but desat into 20's came right back. Echo [facility]. Acute cystitis without hematuria ICD-10-CM: N30.00 ICD-9-CM: 595.0  12/29/2020 Unknown Rocphin completed. COVID-19 ICD-10-CM: U07.1 ICD-9-CM: 079.89  12/24/2020 Unknown Ongoing. Completed decadron yesterday * (Principal) Acute respiratory distress syndrome (ARDS) due to severe acute respiratory syndrome coronavirus 2 (SARS-CoV-2) (HCC) ICD-10-CM: U07.1, J80 
ICD-9-CM: 518.82, 079.89  12/24/2020 Unknown CXR is worse Acute hypoxemic respiratory failure ([facility] [address] ICD-10-CM: J96.01 
ICD-9-CM: 518.81  12/24/2020 Unknown Oxygenation adequate on 60% Acute on chronic renal failure (HCC) ICD-10-CM: N17.9, N18.9 ICD-9-CM: 584.9, 585.9  9/17/2019 Unknown Cr is trending down Coronary artery disease involving coronary bypass graft of native heart without angina pectoris (Chronic) ICD-10-CM: R22.233 ICD-9-CM: 414.05  6/15/2015 Yes Uncontrolled type 2 diabetes mellitus with hyperglycemia (HCC) (Chronic) ICD-10-CM: E11.65 ICD-9-CM: 250.02  6/15/2015 Yes Resume lantus once daily. Diabetes mellitus with hyperosmolarity without hyperglycemic hyperosmolar nonketotic coma (HCC) (Chronic) ICD-10-CM: E11.00 ICD-9-CM: 250.20  11/24/2014 Yes Expect BS to improve now that off decadron Patient with ARDS, severe acute hypoxic due to COVID PNA. Plan:  (Medical Decision Making) No weaning today due to delirium and agitation and high WOB Wean precedex Restart Diprivan and Fentanyl Increase free water due to GI losses. Cont H2B. Cont TF. Finished rocephin for UTI. Called daughter Dale Moore with update [phone]) The patient is critically ill with respiratory failure, circulatory failure and requires high complexity decision making for assessment and support including frequent ventilator adjustment , frequent evaluation and titration of therapies , application of advanced monitoring technologies and extensive interpretation of multiple databases Time devoted to patient care services described in this note- 15 min face to face/ 20 min total evaluation time Cumulative time devoted to patient care services by me for day of service -35 min Wanda Gross MD

## 2021-01-04 NOTE — PROGRESS NOTES
Bedside and Verbal shift change report given to Dearalmita Kathleen RN (oncoming nurse) by Chacho Lerma RN (offgoing nurse). Report included the following information SBAR, Kardex, Intake/Output, MAR, Recent Results, Cardiac Rhythm NSR and Alarm Parameters .

## 2021-01-04 NOTE — PROGRESS NOTES
RNCM reviewed chart for discharge planning. Pt currently is intubated in the BMT/ICU Weaning precedex 60% Fi02 VC+ PPD placed on 12/29 PT will need to re-eval once patient is medically stable to participate. HH vs [facility]? Will continue to follow for discharge planning needs Please consult  if any new issues arise

## 2021-01-04 NOTE — PROGRESS NOTES
[facility] PROGRESS NOTE Follow up for: PRATIMA Subjective:  
Patient seen and examined in [facility] ICU remains intubated on 60 % FiO2, 12 PEEP, tachypnea, good UOP via coyle Labs and chart reviewed, SHAMIKA ICU RN plan to add propofol ROS: 
Unable to obtain Objective:  
Exam: 
Vitals:  
 01/04/21 1000 01/04/21 1029 01/04/21 1100 01/04/21 1135 BP: (!) 164/96 (!) 157/82 (!) 161/85 (!) 148/77 Pulse: (!) 101 89 92 89 Resp: (!) 43 (!) 31 (!) 38 (!) 45 Temp:    96.9 °F (36.1 °C) SpO2: (!) 82% 94% (!) 89% 93% Weight:      
Height:      
 
 
 
Intake/Output Summary (Last 24 hours) at 1/4/2021 1224 Last data filed at 1/4/2021 1135 Gross per 24 hour Intake 2554.75 ml Output 1810 ml Net 744.75 ml Current Facility-Administered Medications Medication Dose Route Frequency  propofol (DIPRIVAN) 10 mg/mL infusion  0-50 mcg/kg/min IntraVENous TITRATE  dextrose 5% infusion  50 mL/hr IntraVENous CONTINUOUS  
 dexmedeTOMidine (PRECEDEX) 400 mcg in 0.9% sodium chloride 100 mL infusion  0.1-1.5 mcg/kg/hr IntraVENous TITRATE  fentaNYL citrate (PF) injection 50 mcg  50 mcg IntraVENous Q2H PRN  
 bisacodyL (DULCOLAX) suppository 10 mg  10 mg Rectal DAILY PRN  
 insulin glargine (LANTUS) injection 20 Units  20 Units SubCUTAneous DAILY  [Held by provider] polyethylene glycol (MIRALAX) packet 17 g  17 g Oral DAILY  [Held by provider] sodium zirconium cyclosilicate (LOKELMA) powder packet 5 g  5 g Oral DAILY  insulin lispro (HUMALOG) injection 0-15 Units  0-15 Units SubCUTAneous Q6H  
 [Held by provider] senna-docusate (PERICOLACE) 8.6-50 mg per tablet 2 Tab  2 Tab Per NG tube DAILY  NUTRITIONAL SUPPORT ELECTROLYTE PRN ORDERS   Does Not Apply PRN  
 fentaNYL in normal saline (pf) 25 mcg/mL infusion  0-200 mcg/hr IntraVENous TITRATE  ascorbic acid (vitamin C) (VITAMIN C) tablet 1,000 mg  1,000 mg Per NG tube BID  
  cholecalciferol (VITAMIN D3) (1000 Units /25 mcg) tablet 5 Tab  5,000 Units Per NG tube DAILY  [Held by provider] hydrALAZINE (APRESOLINE) tablet 10 mg  10 mg Per NG tube TID  zinc sulfate tablet 220 mg  220 mg Per G Tube DAILY  famotidine (PF) (PEPCID) 20 mg in 0.9% sodium chloride 10 mL injection  20 mg IntraVENous DAILY  dextrose 40% (GLUTOSE) oral gel 1 Tube  15 g Oral PRN  
 glucagon (GLUCAGEN) injection 1 mg  1 mg IntraMUSCular PRN  
 dextrose (D50W) injection syrg 12.5-25 g  25-50 mL IntraVENous PRN  
 albuterol (PROVENTIL HFA, VENTOLIN HFA, PROAIR HFA) inhaler 2 Puff  2 Puff Inhalation Q4H PRN  
 hydrALAZINE (APRESOLINE) 20 mg/mL injection 20 mg  20 mg IntraVENous Q2H PRN  phenol throat spray (CHLORASEPTIC) 1 Spray  1 Spray Oral PRN  
 lip protectant (BLISTEX) ointment 1 Each  1 Each Topical PRN  
 acetaminophen (TYLENOL) tablet 650 mg  650 mg Oral Q6H PRN  
 0.9% sodium chloride infusion 250 mL  250 mL IntraVENous PRN  
 sodium chloride (NS) flush 5-40 mL  5-40 mL IntraVENous Q8H  
 sodium chloride (NS) flush 5-40 mL  5-40 mL IntraVENous PRN  
 heparin (porcine) injection 5,000 Units  5,000 Units SubCUTAneous Q8H  
 albuterol (PROVENTIL VENTOLIN) nebulizer solution 2.5 mg  2.5 mg Nebulization Q6H RT  
 
 
Physical EXAM  
GEN - intubated, sedated on vent CV - Regular rate, S1, S2, no Rub Lung - coarse bilaterally Abd -  Soft, non tender, + BS Ext - no edema Recent Labs 01/02/21 
6806 WBC 12.0* HGB 8.1* HCT 26.5*  
 Recent Labs 01/04/21 
6785 01/03/21 
0014 01/02/21 
2830 * 148* 148* K 3.8 4.3 4.2 * 117* 117* CO2 23 25 24 BUN 89* 109* 100* CREA 2.42* 2.96* 3.30* CA 8.2* 8.4 8.0*  
* 175* 106* MG 2.9*  --   --   
PHOS 3.5  --   --   
 
 
Assessment and Plan: 1. PRATIMA over CKD Possibly COVID ATN , non oliguric Baseline 1 to 1.5 , admitted with Cr of 2.8 on 12/24 -Echo from 2019 - Normal EF and systolic function 
- renal function improving, non oliguric. 2. Hyperkalemia- resolved 3. Hypernatremia - on D5W  
  
4. COVID 19 + 5.  Anemia hgb trending down labs in AM, transfuse hgb < 7.0 
 
 
   
Saravanan Khanna, NP

## 2021-01-04 NOTE — PROGRESS NOTES
Ventilator check complete; patient has a #7.5 ET tube secured at the 22 at the lip. Patient is sedated. Patient is not able to follow commands. Breath sounds are crackles and diminished. Trachea is midline, Negative for subcutaneous air, and chest excursion is symmetric. Patient is also Negative for cyanosis and is Negative for pitting edema. All alarms are set and audible. Resuscitation bag is at the head of the bed. Ventilator Settings Mode FIO2 Rate Tidal Volume Pressure PEEP I:E Ratio VC+  50 % 20  400 ml    12 cm H20  1:2.3 Peak airway pressure: 31 cm H2O Minute ventilation: 8.6 l/min [facility], RT

## 2021-01-04 NOTE — PROGRESS NOTES
Ventilator check complete; patient has a #7.5 ET tube secured at the 22 at the lip. Patient is sedated. Patient is not able to follow commands. Breath sounds are coarse/diminished. Trachea is midline, Negative for subcutaneous air, and chest excursion is symmetric. Patient is also Negative for cyanosis. All alarms are set and audible. Resuscitation bag is at the head of the bed. Ventilator Settings Mode FIO2 Rate Tidal Volume Pressure PEEP I:E Ratio Assist control, VC+  60 %  20  400 ml    12 cm H20  1:2.3 Peak airway pressure: 28 cm H2O Minute ventilation: 13.7 l/min Saundra Ugalde, RT

## 2021-01-04 NOTE — PROGRESS NOTES
Problem: Falls - Risk of 
Goal: *Absence of Falls Description: Document Everardo Holliday Fall Risk and appropriate interventions in the flowsheet. Outcome: Progressing Towards Goal 
Note: Fall Risk Interventions: 
Mobility Interventions: Bed/chair exit alarm Mentation Interventions: Evaluate medications/consider consulting pharmacy Medication Interventions: Evaluate medications/consider consulting pharmacy Elimination Interventions: Bed/chair exit alarm, Call light in reach Problem: Patient Education: Go to Patient Education Activity Goal: Patient/Family Education Outcome: Progressing Towards Goal 
  
Problem: Pressure Injury - Risk of 
Goal: *Prevention of pressure injury Description: Document Ashu Scale and appropriate interventions in the flowsheet. Outcome: Progressing Towards Goal 
Note: Pressure Injury Interventions: 
Sensory Interventions: Float heels, Keep linens dry and wrinkle-free, Minimize linen layers, Monitor skin under medical devices, Turn and reposition approx. every two hours (pillows and wedges if needed) Moisture Interventions: Apply protective barrier, creams and emollients, Check for incontinence Q2 hours and as needed, Minimize layers, Maintain skin hydration (lotion/cream) Activity Interventions: Pressure redistribution bed/mattress(bed type) Mobility Interventions: Pressure redistribution bed/mattress (bed type) Nutrition Interventions: Document food/fluid/supplement intake Friction and Shear Interventions: Apply protective barrier, creams and emollients, Foam dressings/transparent film/skin sealants, Minimize layers Problem: Patient Education: Go to Patient Education Activity Goal: Patient/Family Education Outcome: Progressing Towards Goal 
  
Problem: Diabetes Self-Management Goal: *Disease process and treatment process Description: Define diabetes and identify own type of diabetes; list 3 options for treating diabetes. Outcome: Progressing Towards Goal 
Goal: *Incorporating nutritional management into lifestyle Description: Describe effect of type, amount and timing of food on blood glucose; list 3 methods for planning meals. Outcome: Progressing Towards Goal 
Goal: *Incorporating physical activity into lifestyle Description: State effect of exercise on blood glucose levels. Outcome: Progressing Towards Goal 
Goal: *Developing strategies to promote health/change behavior Description: Define the ABC's of diabetes; identify appropriate screenings, schedule and personal plan for screenings. Outcome: Progressing Towards Goal 
Goal: *Using medications safely Description: State effect of diabetes medications on diabetes; name diabetes medication taking, action and side effects. Outcome: Progressing Towards Goal 
Goal: *Monitoring blood glucose, interpreting and using results Description: Identify recommended blood glucose targets  and personal targets. Outcome: Progressing Towards Goal 
Goal: *Prevention, detection, treatment of acute complications Description: List symptoms of hyper- and hypoglycemia; describe how to treat low blood sugar and actions for lowering  high blood glucose level. Outcome: Progressing Towards Goal 
Goal: *Prevention, detection and treatment of chronic complications Description: Define the natural course of diabetes and describe the relationship of blood glucose levels to long term complications of diabetes. Outcome: Progressing Towards Goal 
Goal: *Developing strategies to address psychosocial issues Description: Describe feelings about living with diabetes; identify support needed and support network Outcome: Progressing Towards Goal 
Goal: *Insulin pump training Outcome: Progressing Towards Goal 
Goal: *Sick day guidelines Outcome: Progressing Towards Goal 
Goal: *Patient Specific Goal (EDIT GOAL, INSERT TEXT) Outcome: Progressing Towards Goal 
  
 Problem: Patient Education: Go to Patient Education Activity Goal: Patient/Family Education Outcome: Progressing Towards Goal 
  
Problem: Delirium Treatment Goal: *Level of consciousness restored to baseline Outcome: Progressing Towards Goal 
Goal: *Level of environmental perceptions restored to baseline Outcome: Progressing Towards Goal 
Goal: *Sensory perception restored to baseline Outcome: Progressing Towards Goal 
Goal: *Emotional stability restored to baseline Outcome: Progressing Towards Goal 
Goal: *Functional assessment restored to baseline Outcome: Progressing Towards Goal 
Goal: *Absence of falls Outcome: Progressing Towards Goal 
Goal: *Will remain free of delirium, CAM Score negative Outcome: Progressing Towards Goal 
Goal: *Cognitive status will be restored to baseline Outcome: Progressing Towards Goal 
Goal: Interventions Outcome: Progressing Towards Goal 
  
Problem: Patient Education: Go to Patient Education Activity Goal: Patient/Family Education Outcome: Progressing Towards Goal 
  
Problem: Non-Violent Restraints Goal: *Removal from restraints as soon as assessed to be safe Outcome: Progressing Towards Goal 
Goal: *No harm/injury to patient while restraints in use Outcome: Progressing Towards Goal 
Goal: *Patient's dignity will be maintained Outcome: Progressing Towards Goal 
Goal: *Patient Specific Goal (EDIT GOAL, INSERT TEXT) Outcome: Progressing Towards Goal 
Goal: Non-violent Restaints:Standard Interventions Outcome: Progressing Towards Goal 
Goal: Non-violent Restraints:Patient Interventions Outcome: Progressing Towards Goal 
Goal: Patient/Family Education Outcome: Progressing Towards Goal 
  
Problem: Ventilator Management Goal: *Adequate oxygenation and ventilation Outcome: Progressing Towards Goal 
Goal: *Patient maintains clear airway/free of aspiration Outcome: Progressing Towards Goal 
Goal: *Absence of infection signs and symptoms Outcome: Progressing Towards Goal 
Goal: *Normal spontaneous ventilation Outcome: Progressing Towards Goal 
  
Problem: Patient Education: Go to Patient Education Activity Goal: Patient/Family Education Outcome: Progressing Towards Goal

## 2021-01-05 NOTE — PROGRESS NOTES
Ventilator check complete; patient has a #7.5 ET tube secured at the 23 at the lip. Patient is sedated. Patient is not able to follow commands. Breath sounds are coarse and diminished. Trachea is midline, Negative for subcutaneous air, and chest excursion is symmetric. Patient is also Negative for cyanosis and is Negative for pitting edema. All alarms are set and audible. Resuscitation bag is at the head of the bed. Ventilator Settings Mode FIO2 Rate Tidal Volume PEEP I:E Ratio VC+ 70%   20 bpm 400 ml  12 cm H20  1:2.33 Peak airway pressure: 21 cm H2O Minute ventilation: 14.2 l/min Ritchie Acosta RT

## 2021-01-05 NOTE — PROGRESS NOTES
Patient's daughter - Kenny Lopez called and has requested to be main point of contact for updates from providers. Phone number is [phone].

## 2021-01-05 NOTE — PROGRESS NOTES
[facility] COVID-19 Note: 
 
Critical Care Note: 1/5/2021 Cesar Rodas Admission Date: 12/24/2020     Length of Stay: 12 days Background: 78 y.o. y/o female with acute hypoxemic respiratory failure secondary to COVID-19. Course has been complicated by need for mechanical ventilation, mucus plugging, renal failure Onset of COVID-19 symptoms: RAPID TEST POSITIVE 12/24 Notable PMH: DM, CAD, HTN, CKD 
 
24 Hour events:  
Mucus plugging overnight and up to 100% FiO2 P:F ratio way down at 68. CXR with R effusion Leukocytosis rising to 19.1K today Platelets slowly trending down 212-->130s. FSBS 206 Creatinine improved at 2.15 
 
ROS: intubated, sedated Lines: (insertion date) ETT: (12/30/2020) Clifton: (12/26/2020) OGT: (12/30/2020) Central line: (12/30/2020) Drips: current dose (range) Propofol Fentanyl 125 mcg/hr (0-300) D5w Visit Vitals BP (!) 116/56 Pulse 83 Temp 97.6 °F (36.4 °C) Resp 16 Ht 5' 5\" (1.651 m) Wt 171 lb 4.8 oz (77.7 kg) SpO2 93% BMI 28.51 kg/m² Pertinent Exam:           
Constitutional:  intubated and mechanically ventilated. EENMT:  Sclera clear, pupils equal, oral mucosa moist 
Respiratory: coarse bilaterally Cardiovascular:  RRR Gastrointestinal:  soft with no tenderness; positive bowel sounds present Musculoskeletal:  warm with no cyanosis, trace lower extremity edema Skin:  no jaundice or ecchymosis Neurologic:unresponsive Psychiatric: sedated and paralyzed Intake/Output Summary (Last 24 hours) at 1/5/2021 5007 Last data filed at 1/5/2021 0600 Gross per 24 hour Intake 4950.27 ml Output 1910 ml Net 3040.27 ml CXR 
 
 
TTE 12/31/2020 LV systolic function normal with EF 60 to 65%. Diastolic dysfunction noted. RVSP 45-50. Valves normal. 
 
Pertinent Labs:  
Recent Labs 01/05/21 
1971 WBC 19.1* HGB 7.8* HCT 25.2*  
* Recent Labs 01/05/21 
0313 01/04/21 6211 01/03/21 
8049  148* 148* K 3.8 3.8 4.3 * 117* 117* CO2 25 23 25 * 201* 175* BUN 82* 89* 109* CREA 2.15* 2.42* 2.96* MG  --  2.9*  --   
CA 8.2* 8.2* 8.4 PHOS  --  3.5  --   
 
Recent Labs 01/05/21 
0556 01/04/21 
2352 01/04/21 
1753 GLUCPOC 271* 177* 96 SARS-CoV-2 LAB Results Rapid positive 12/24 MICRO Date Source Result 12/24  blood  NG 5 days 12/24  blood  NG 5 days 12/24  urine   K E. coli & mixed skin farheen  
12/26  urine >100K candida krusei COVID-19 Meds: 
Dexamethasone 6mg daily (12/24-1/2) Convalescent plasma transfusion (12/24) Anti-infectives (start date): 
None current Ventilator Settings:  5' 5\" (1.651 m) Mode FIO2 Rate Tidal Volume Pressure PEEP Pressure support  100 %    400 ml  12 cm H2O(Increased post ABG)  14 cm H20(Increased due to episodes of desaturation.) Peak airway pressure: 27 cm H2O Minute ventilation: 11.3 l/min ABG:  
Recent Labs 01/05/21 
0403 01/04/21 
0327 01/03/21 
0310 PHI 7.28* 7.31* 7.41  
PCO2I 51.1* 40.7 34.5*  
PO2I 68* 213* 84 HCO3I 23.8 20.4* 21.7* Impression: 78 y.o. y/o female with acute hypoxemic respiratory failure secondary to COVID-19. NEURO:  
1. Sedation: Versed gtt 2. Analgesia: Fentanyl gtt 3. Paralytics: may be needed to confirm low tidal volume ventilation can be maintained. CV: 1. Volume Status: avoid fluids when possible. Will give lasix/albumin today. 2. Hypotension:  Check d-dimer, monitor CVP (currently 11) PULM:  
1. Acute hypoxemic/hypercapneic respiratory failure:  Titrate Min Vent to pH/CO2. Day # 6 on ventilator 2. Severe ARDS 2/2 COVID: Low Tidal Volume ventilation, goal 6cc per kg (Ideal body weight: 57 kg (125 lb 10.6 oz) x 6 = 348ml). Attempt to limit driving pressure to 43ZP/M2L or less. Allow permissive hypercapnia/acidosis to pH 7.25 if needed to achieve above. P:F ratio today = 68 . Consider proning for 12 hours daily  severe ARDS with P:F <150. Consider inhaled Flolan if P:F<100. Paralytics if needed for dyssynchrony. RENAL: 
1. PRATIMA: monitor. GI: NPO, PPI prophy 1. Nutrition: continue TF 
HEME: no acute issues 1. Thrombocytopenia: platelets slowly dropping. Sent HIT Ab 
2. Anticoagulation: d-dimer to determine anticoagulation ppx for VTE. ID: WBC elevated, afebrile. 1. COVID-19: COVID meds as above, multi-D rounds with pharmacy to optimize meds with noted contraindications ENDO: 
1. DM: on lantus 20U daily with hyperglycemia. stopping D5 now so will monitor. Skin: no decub, turns, preventive care Prophy:f/u d-dimer Full Code Family updated by phone after rounds. Sharon Madsen can be reached at [phone]. The patient is critically ill with respiratory failure, circulatory failure and requires high complexity decision making for assessment and support including frequent ventilator adjustment , frequent evaluation and titration of therapies , application of advanced monitoring technologies and extensive interpretation of multiple databases. Cumulative time devoted to patient care services by me for day of service -45min Zach Fernandes MD

## 2021-01-05 NOTE — PROGRESS NOTES
Approximately 2200 Spoke to Dr. Karine Crenshaw pt has blood tinged sputum coming from ETT and continues to drop oxygen saturation despite suctioning and repositioning pt, RT at bedside to assist with oxygenation. Venous gas done. Orders received for CXR and 40mg IV Lasix once. Sedation increase as pt is fighting ventilator and moving about in bed. Pt does not follow commands but does opens eyes to name tries to sit up in bed and attempts to reach for ETT. Currently pt on 100% FiO2 VSS O2 99%.

## 2021-01-05 NOTE — PROGRESS NOTES
[facility] PROGRESS NOTE Follow up for: PRATIMA Subjective:  
Patient seen and examined in [facility] ICU remains intubated, sedated on 90 % FiO2 ventilation. Labs and chart reviewed,  ICU RN non oliguric, renal function improving. ROS: 
Unable to obtain Objective:  
Exam: 
Vitals:  
 01/05/21 0916 01/05/21 0930 01/05/21 0945 01/05/21 1001 BP: (!) 76/44 (!) 108/52 125/62 (!) 102/47 Pulse: (!) 56 (!) 58 63 (!) 48 Resp: 25 20 17 17 Temp:      
SpO2: 100% 99% 100% 100% Weight:      
Height:      
 
 
 
Intake/Output Summary (Last 24 hours) at 1/5/2021 1026 Last data filed at 1/5/2021 8951 Gross per 24 hour Intake 4950.27 ml Output 1910 ml Net 3040.27 ml  
 
 
Current Facility-Administered Medications Medication Dose Route Frequency  sodium chloride 3% hypertonic nebulizer soln  4 mL Nebulization BID  midazolam in normal saline (VERSED) 1 mg/mL infusion  0-10 mg/hr IntraVENous TITRATE  fentaNYL in normal saline (pf) 25 mcg/mL infusion  0-200 mcg/hr IntraVENous TITRATE  albumin human 25% (BUMINATE) solution 25 g  25 g IntraVENous Q6H  
 furosemide (LASIX) injection 40 mg  40 mg IntraVENous Q12H  
 insulin regular (NOVOLIN R, HUMULIN R) injection   SubCUTAneous Q6H  
 NOREPINephrine (LEVOPHED) 4 mg in 5% dextrose 250 mL infusion  0.5-30 mcg/min IntraVENous TITRATE  dexmedeTOMidine (PRECEDEX) 400 mcg in 0.9% sodium chloride 100 mL infusion  0.1-1.5 mcg/kg/hr IntraVENous TITRATE  fentaNYL citrate (PF) injection 50 mcg  50 mcg IntraVENous Q2H PRN  
 bisacodyL (DULCOLAX) suppository 10 mg  10 mg Rectal DAILY PRN  
 insulin glargine (LANTUS) injection 20 Units  20 Units SubCUTAneous DAILY  [Held by provider] polyethylene glycol (MIRALAX) packet 17 g  17 g Oral DAILY  [Held by provider] sodium zirconium cyclosilicate (LOKELMA) powder packet 5 g  5 g Oral DAILY  [Held by provider] senna-docusate (PERICOLACE) 8.6-50 mg per tablet 2 Tab  2 Tab Per NG tube DAILY  NUTRITIONAL SUPPORT ELECTROLYTE PRN ORDERS   Does Not Apply PRN  
 [Held by provider] hydrALAZINE (APRESOLINE) tablet 10 mg  10 mg Per NG tube TID  famotidine (PF) (PEPCID) 20 mg in 0.9% sodium chloride 10 mL injection  20 mg IntraVENous DAILY  dextrose 40% (GLUTOSE) oral gel 1 Tube  15 g Oral PRN  
 glucagon (GLUCAGEN) injection 1 mg  1 mg IntraMUSCular PRN  
 dextrose (D50W) injection syrg 12.5-25 g  25-50 mL IntraVENous PRN  
 albuterol (PROVENTIL HFA, VENTOLIN HFA, PROAIR HFA) inhaler 2 Puff  2 Puff Inhalation Q4H PRN  
 hydrALAZINE (APRESOLINE) 20 mg/mL injection 20 mg  20 mg IntraVENous Q2H PRN  phenol throat spray (CHLORASEPTIC) 1 Spray  1 Spray Oral PRN  
 lip protectant (BLISTEX) ointment 1 Each  1 Each Topical PRN  
 acetaminophen (TYLENOL) tablet 650 mg  650 mg Oral Q6H PRN  
 0.9% sodium chloride infusion 250 mL  250 mL IntraVENous PRN  
 sodium chloride (NS) flush 5-40 mL  5-40 mL IntraVENous Q8H  
 sodium chloride (NS) flush 5-40 mL  5-40 mL IntraVENous PRN  
 heparin (porcine) injection 5,000 Units  5,000 Units SubCUTAneous Q8H  
 albuterol (PROVENTIL VENTOLIN) nebulizer solution 2.5 mg  2.5 mg Nebulization Q6H RT  
 
 
Physical EXAM  
GEN - intubated, sedated on vent CV - Regular rate, S1, S2, no Rub Lung - coarse bilaterally Abd -  Soft, non tender, + BS Ext - no edema Recent Labs 01/05/21 
0254 WBC 19.1* HGB 7.8* HCT 25.2*  
* Recent Labs 01/05/21 
4973 01/04/21 
4293 01/03/21 
2715  148* 148* K 3.8 3.8 4.3 * 117* 117* CO2 25 23 25 BUN 82* 89* 109* CREA 2.15* 2.42* 2.96* CA 8.2* 8.2* 8.4 * 201* 175* MG  --  2.9*  --   
PHOS  --  3.5  -- Assessment and Plan: 1. PRATIMA over CKD Possibly COVID ATN , non oliguric Baseline 1 to 1.5 , admitted with Cr of 2.8 on 12/24 -Echo from 2019 - Normal EF and systolic function 
- renal function improving, non oliguric. 2. Hyperkalemia- resolved 3. Hypernatremia - improved on D5W off IVF, now on lasix albumin   
  
4. COVID 19 + 5.  Anemia hgb trending down labs in AM, transfuse hgb < 7.0 
 
 
   
Priya Vickers, NP

## 2021-01-05 NOTE — PROGRESS NOTES
Patient restless, spontaneous nonpurposeful movement, pupils reactive. Breath sounds coarse and diminished, symmetrical chest expansion on ventilator. NSR on monitor, S1/S2 auscultated. Bowel sounds hypoactive, abdomen intact and semi-soft. Skin with tear to L wrist, sacrum intact and allevyn in place. Lines: L CVC Drips: fentanyl, propofol, D5 Drains: flexiseal, OGT, coyle D5 stopped per Dr. Benjamin Bains, fentanyl increased per Dr. Benjamin Bains for a goal RASS of -4. Water flush on tube feeds changed from 300 q 4 hours to 75mL q 1 hour per Dr. Benjamin Bains.

## 2021-01-05 NOTE — PROGRESS NOTES
Pt frequently needs suctioning, O2 drops to 84% on 100% FiO2, vent settings adjusted by RT, notified Dr. Chavez Fails that pt may be having frequent mucus plugs and sputum continues to be blood tinged. No new orders at this time will continue to monitor pt. Pt tolerates PS on vent setting best, able to decrease sedation needs but O2 still drops randomly.

## 2021-01-06 NOTE — PROGRESS NOTES
1/5 request from bedside  RN  To call daughter Shahab Beverly. Called placed to [employer] [phone] Daughter would like to be named HCPOA for her mother. Shahab Beverly states she is the main care giver for her mother. CM explained that she could only be named HCPOA if her mother is able to request this. Currently patient is intubated in the BMT/ICU [place] understands the legalities and that she and her sister  Are given equal decision making capabilities without a designated HCPOA . Chart reviewed by [facility]  
LOS 12d 
02 demands Vent 70% Fi02 Discharge plan  undetermined at this time. Will continue to follow for discharge planning needs Please consult  if any new issues arise

## 2021-01-06 NOTE — PROGRESS NOTES
[facility] COVID-19 Note: 
 
Critical Care Note: 1/6/2021 Roxy Damon Admission Date: 12/24/2020     Length of Stay: 13 days Background: 78 y.o. y/o female with acute hypoxemic respiratory failure secondary to COVID-19. Course has been complicated by need for mechanical ventilation, mucus plugging, renal failure Onset of COVID-19 symptoms: RAPID TEST POSITIVE 12/24 Notable PMH: DM, CAD, HTN, CKD 
 
24 Hour events: On vent, 70 % Fi02 Leukocytosis down Creatinine stable at 2.6 ROS: intubated, sedated Lines: (insertion date) ETT: (12/30/2020) Clif[place]: (12/26/2020) OGT: (12/30/2020) Central line: (12/30/2020) Drips: current dose (range) Versed gtt- 6 mg/h Fentanyl  200 mcg/hr (0-300) Levophed 4 mcq Visit Vitals BP (!) 95/50 Pulse (!) 55 Temp (!) 96.1 °F (35.6 °C) Resp (!) 33 Ht 5' 5\" (1.651 m) Wt 170 lb 3.1 oz (77.2 kg) SpO2 100% BMI 28.32 kg/m² Pertinent Exam:           
Constitutional:  intubated and mechanically ventilated. EENMT:  Sclera clear, pupils equal, oral mucosa moist 
Respiratory: coarse bilaterally Cardiovascular:  RRR Gastrointestinal:  soft with no tenderness; positive bowel sounds present Musculoskeletal:  warm with no cyanosis, trace lower extremity edema Skin:  no jaundice or ecchymosis Neurologic:unresponsive Psychiatric: sedated and paralyzed Intake/Output Summary (Last 24 hours) at 1/6/2021 1050 Last data filed at 1/6/2021 1006 Gross per 24 hour Intake 3495.85 ml Output 1052 ml Net 2443.85 ml CXR 
 
 
TTE 12/31/2020 LV systolic function normal with EF 60 to 65%. Diastolic dysfunction noted. RVSP 45-50. Valves normal. 
 
Pertinent Labs:  
Recent Labs 01/06/21 
0202 01/05/21 
1204 01/05/21 
1069 WBC 13.4* 21.8* 19.1* HGB 6.2* 7.1* 7.8* HCT 19.9* 22.9* 25.2*  
* 153 135* Recent Labs 01/06/21 
0202 01/05/21 
1746 01/04/21 2123  142 148* K 4.1 3.8 3.8 * 112* 117* CO2 25 25 23 * 206* 201* BUN 84* 82* 89* CREA 2.65* 2.15* 2.42* MG 2.6*  --  2.9*  
CA 8.0* 8.2* 8.2* PHOS 4.3*  --  3.5 ALB 3.0*  --   --   
TBILI 0.7  --   --   
ALT 29  --   --   
 
Recent Labs 01/06/21 
0540 01/05/21 
2343 01/05/21 
1737 GLUCPOC 169* 199* 229* SARS-CoV-2 LAB Results Rapid positive 12/24 MICRO Date Source Result 12/24  blood  NG 5 days 12/24  blood  NG 5 days 12/24  urine   K E. coli & mixed skin farheen  
12/26  urine >100K candida krusei COVID-19 Meds: 
Dexamethasone 6mg daily (12/24-1/2) Convalescent plasma transfusion (12/24) Anti-infectives (start date): 
None current Ventilator Settings:  5' 5\" (1.651 m) Mode FIO2 Rate Tidal Volume Pressure PEEP  
VC+  70 %    350 ml  12 cm H2O(Increased post ABG)  14 cm H20 Peak airway pressure: 35 cm H2O Minute ventilation: 12.8 l/min ABG:  
Recent Labs 01/06/21 
0434 01/05/21 
1114 01/05/21 
0403 PHI 7.22* 7.27* 7.28* PCO2I 55.8* 51.6* 51.1*  
PO2I 65* 247* 68* HCO3I 22.9 23.8 23.8 Impression: 78 y.o. y/o female with acute hypoxemic respiratory failure secondary to COVID-19. NEURO:  
1. Sedation: Versed gtt 2. Analgesia: Fentanyl gtt 3. Paralytics: may be needed to confirm low tidal volume ventilation can be maintained. CV: 1. Volume Status: avoid fluids when possible. Will give lasix/albumin today. 2. Hypotension:  Check d-dimer, monitor CVP (currently 11) PULM:  
1. Acute hypoxemic/hypercapneic respiratory failure:  Titrate Min Vent to pH/CO2. Day # 6 on ventilator 2. Severe ARDS 2/2 COVID: Low Tidal Volume ventilation, goal 6cc per kg (Ideal body weight: 57 kg (125 lb 10.6 oz) x 6 = 348ml). Attempt to limit driving pressure to 73VC/T4J or less. Allow permissive hypercapnia/acidosis to pH 7.25 if needed to achieve above. Consider proning for 12 hours daily  severe ARDS with P:F <150. Consider inhaled Flolan if P:F<100. Paralytics if needed for dyssynchrony. RENAL: 
1. PRATIMA: monitor. GI: NPO, PPI prophy 1. Nutrition: continue TF 
HEME: no acute issues 1. Thrombocytopenia: platelets slowly dropping. Sent HIT Ab 
2. Anticoagulation: d-dimer to determine anticoagulation ppx for VTE. ID: WBC elevated, afebrile. 1. COVID-19: COVID meds as above, multi-D rounds with pharmacy to optimize meds with noted contraindications ENDO: 
1. DM: on lantus 20U daily with hyperglycemia. stopping D5 now so will monitor. Skin: no decub, turns, preventive care Prophy:GI/DVT Full Code Family updated by phone after rounds. Wendy Pacheco  But left message as mailbox was full Lilo Rafaela can be reached at [phone]. The patient is critically ill with respiratory failure, circulatory failure and requires high complexity decision making for assessment and support including frequent ventilator adjustment , frequent evaluation and titration of therapies , application of advanced monitoring technologies and extensive interpretation of multiple databases.   Cumulative time devoted to patient care services by me for day of service -45min  
 
Edin Keller MD

## 2021-01-06 NOTE — PROGRESS NOTES
Pharmacokinetic Consult to Pharmacist 
 
Thad Cockayne is a 78 y.o. female being treated for sepsis with vancomycin. Height: 5' 5\" (165.1 cm)  Weight: 77.2 kg (170 lb 3.1 oz) Lab Results Component Value Date/Time BUN 84 (H) 01/06/2021 02:02 AM  
 Creatinine 2.65 (H) 01/06/2021 02:02 AM  
 WBC 13.4 (H) 01/06/2021 02:02 AM  
 Procalcitonin 1.52 01/05/2021 07:39 AM  
 Lactic acid 1.4 12/24/2020 03:27 PM  
 Lactic Acid (POC) 1.55 09/16/2019 11:40 AM  
  
Estimated Creatinine Clearance: 16.8 mL/min (A) (based on SCr of 2.65 mg/dL (H)). Lab Results Component Value Date/Time Vancomycin,trough 15.3 09/06/2019 06:45 AM  
 Vancomycin, random 16.6 01/06/2021 11:29 AM  
 
 
Day 2 of vancomycin. Goal trough is 15-20. Random level is within goal range. Will give vancomycin 1000 mg IV x 1 today and continue to dose intermittently. Further levels will be ordered as clinically indicated. Pharmacy will continue to follow. Please call with any questions. Thank you, Dilma Quijano, PharmD, BCPS Clinical Pharmacist 
[phone]

## 2021-01-06 NOTE — PROGRESS NOTES
Pt Hgb 6.2 notified Dr. Alicia Vega order received for LR 1 liter bolus over 4 hours d/t drop in urine output and 1 unit PRBC's, informed Dr. Alicia Vega that pt BNP was 3052, orders to continue and give liter bolus and blood. Also will hold tube feed until day shift d/t high residuals, last residual 335ml. Pt has small amount of bleeding around hemorrhoids but no blood is in the FMS, pt continues to have blood tinged sputum but less significant than yesterday tube feed residual color is tan.

## 2021-01-06 NOTE — PROGRESS NOTES
Pt tube feed residual 475ml, per Dr. Nancy Williamson put residual back and hold tube feed for a couple hours.

## 2021-01-06 NOTE — PROGRESS NOTES
Patient responds to noxious stimuli only, no command following, weak cough with suction, pupils reactive, extremities do not withdraw to pain. Breath sounds coarse, symmetrical chest expansion on ventilator, FiO2 @ 70%, PRVC. SB on monitor, S1/S2 auscultated. Bowel sounds hypoactive, abdomen intact and semi-soft. Skin with skin tear to L wrist and ankle sites CDI. Sacrum intact with no breakdown or redness, allevyn in place. Heel boots present. Lines: L CVC Drips: levo, fentanyl ,versed Drains: coyle, OGT Versed decreased to achieve RASS of -4 per orders. Patient repositioned and turned.

## 2021-01-06 NOTE — PROGRESS NOTES
[facility] PROGRESS NOTE Follow up for: PRATIMA Subjective:  
Patient seen and examined in [facility] ICU remains intubated, sedated on 70 % FiO2, 14 PEEP Labs and chart reviewed,  ICU RN non oliguric, Cr up some, hgb 6.2, plan for 2 units PRBC today ROS: 
Unable to obtain Objective:  
Exam: 
Vitals:  
 01/06/21 9298 01/06/21 0902 01/06/21 8968 01/06/21 0798 BP: (!) 154/70 (!) 148/65 (!) 142/66 (!) 144/66 Pulse: (!) 58 (!) 57 (!) 59 (!) 57 Resp: (!) 34 (!) 34 (!) 33 (!) 33 Temp:      
SpO2: 100% 100% 100% 100% Weight:      
Height:      
 
 
 
Intake/Output Summary (Last 24 hours) at 1/6/2021 7790 Last data filed at 1/6/2021 4160 Gross per 24 hour Intake 3495.85 ml Output 837 ml Net 2658. 85 ml Current Facility-Administered Medications Medication Dose Route Frequency  0.9% sodium chloride infusion 250 mL  250 mL IntraVENous PRN  
 0.9% sodium chloride infusion 250 mL  250 mL IntraVENous PRN  piperacillin-tazobactam (ZOSYN) 3.375 g in 0.9% sodium chloride (MBP/ADV) 100 mL MBP  3.375 g IntraVENous Q12H  
 midazolam in normal saline (VERSED) 1 mg/mL infusion  0-10 mg/hr IntraVENous TITRATE  fentaNYL in normal saline (pf) 25 mcg/mL infusion  0-200 mcg/hr IntraVENous TITRATE  albumin human 25% (BUMINATE) solution 25 g  25 g IntraVENous Q6H  
 furosemide (LASIX) injection 40 mg  40 mg IntraVENous Q12H  
 insulin regular (NOVOLIN R, HUMULIN R) injection   SubCUTAneous Q6H  
 NOREPINephrine (LEVOPHED) 4 mg in 5% dextrose 250 mL infusion  0.5-30 mcg/min IntraVENous TITRATE  heparin 25,000 units in dextrose 500 mL infusion  18-36 Units/kg/hr IntraVENous TITRATE  sodium chloride 3% hypertonic nebulizer soln  4 mL Nebulization BID  Vancomycin Pharmacy Intermittent Dosing   Other Rx Dosing/Monitoring  dexmedeTOMidine (PRECEDEX) 400 mcg in 0.9% sodium chloride 100 mL infusion  0.1-1.5 mcg/kg/hr IntraVENous TITRATE  fentaNYL citrate (PF) injection 50 mcg  50 mcg IntraVENous Q2H PRN  
 bisacodyL (DULCOLAX) suppository 10 mg  10 mg Rectal DAILY PRN  
 insulin glargine (LANTUS) injection 20 Units  20 Units SubCUTAneous DAILY  [Held by provider] polyethylene glycol (MIRALAX) packet 17 g  17 g Oral DAILY  [Held by provider] sodium zirconium cyclosilicate (LOKELMA) powder packet 5 g  5 g Oral DAILY  [Held by provider] senna-docusate (PERICOLACE) 8.6-50 mg per tablet 2 Tab  2 Tab Per NG tube DAILY  NUTRITIONAL SUPPORT ELECTROLYTE PRN ORDERS   Does Not Apply PRN  
 [Held by provider] hydrALAZINE (APRESOLINE) tablet 10 mg  10 mg Per NG tube TID  famotidine (PF) (PEPCID) 20 mg in 0.9% sodium chloride 10 mL injection  20 mg IntraVENous DAILY  dextrose 40% (GLUTOSE) oral gel 1 Tube  15 g Oral PRN  
 glucagon (GLUCAGEN) injection 1 mg  1 mg IntraMUSCular PRN  
 dextrose (D50W) injection syrg 12.5-25 g  25-50 mL IntraVENous PRN  
 albuterol (PROVENTIL HFA, VENTOLIN HFA, PROAIR HFA) inhaler 2 Puff  2 Puff Inhalation Q4H PRN  
 hydrALAZINE (APRESOLINE) 20 mg/mL injection 20 mg  20 mg IntraVENous Q2H PRN  phenol throat spray (CHLORASEPTIC) 1 Spray  1 Spray Oral PRN  
 lip protectant (BLISTEX) ointment 1 Each  1 Each Topical PRN  
 acetaminophen (TYLENOL) tablet 650 mg  650 mg Oral Q6H PRN  
 0.9% sodium chloride infusion 250 mL  250 mL IntraVENous PRN  
 sodium chloride (NS) flush 5-40 mL  5-40 mL IntraVENous Q8H  
 sodium chloride (NS) flush 5-40 mL  5-40 mL IntraVENous PRN  
 albuterol (PROVENTIL VENTOLIN) nebulizer solution 2.5 mg  2.5 mg Nebulization Q6H RT  
 
 
Physical EXAM  
GEN - intubated, sedated on vent CV - Regular rate, S1, S2, no Rub Lung - coarse bilaterally Abd -  Soft, non tender, + BS Ext - no edema Recent Labs 01/06/21 
0202 01/05/21 
1204 01/05/21 
8667 WBC 13.4* 21.8* 19.1* HGB 6.2* 7.1* 7.8* HCT 19.9* 22.9* 25.2*  
* 153 135* Recent Labs 01/06/21 0202 01/05/21 
6463 01/04/21 
8798  142 148* K 4.1 3.8 3.8 * 112* 117* CO2 25 25 23 BUN 84* 82* 89* CREA 2.65* 2.15* 2.42* CA 8.0* 8.2* 8.2*  
* 206* 201* MG 2.6*  --  2.9*  
PHOS 4.3*  --  3.5 Assessment and Plan: 1. PRATIMA over CKD Possibly COVID ATN , non oliguric Baseline 1 to 1.5 , admitted with Cr of 2.8 on 12/24  
-Echo from 2019 - Normal EF and systolic function  
likely pre renal component as well- hgb 6.2, Creatinine up today, non oliguric. On albumin and lasix continue today 
- on vancomycin- monitor levels 2. Hyperkalemia- resolved 3. Hypernatremia - improved  
  
4. COVID 19 + 5.  Anemia hgb down to 6.2, plan for 2 units PRBC today 
 
 
   
Rabia Curtis NP

## 2021-01-06 NOTE — PROGRESS NOTES
Comprehensive Nutrition Assessment Type and Reason for Visit: Kelsi Anya This assessment was completed remotely from within the hospital. 
 
Nutrition Recommendations/Plan:  
· Enteral Nutrition: · Continue TF via OGT to Vital AF 1.2 at 45 ml/hr. Change water flush to 70ml Q4H. This will provide 1296 kcal (91% estimated calorie needs), 81 grams protein (110% estimated protein needs) , 1296 ml free fluid per day. · Vitamin and Mineral Supplement Therapy: · Electrolyte management replacement protocol implemented. · Labs:  
· EN labs: BMP daily, Mg and Phos MWF 
 
· If continued need to hold TF due to elevated gastric residuals, consider pursuing PN for primary nutrition needs. If within goals of care, please consult RD for PN management. Malnutrition Assessment: 
Malnutrition Status: Insufficient data Nutrition Assessment:  
Nutrition History: 12/31: 3 recorded meals in past 6 days with average intake of 83% Nutrition Background: H/O: CAD, HTN, DM, HLD, peripheral neuropathy, CKD stage III. Presented with dyspnea. Findings of +COVID. Admitted to ICU with ARDS, severe acute hypoxic due to COVID PNA, PRATIMA on CKD. Was requiring BIPAP at night and Aviro during day. Had brief cardiac arrest and was intubated 12/30 Daily Update: 
Diprovan and D5 stopped on 1/5. Free water adjusted per MD to 75ml/hr (provides same total volume of free water). FMS removed 1/5. Noted TF were held due to residuals of 335ml and 475ml. RN reports TF were resumed at 0900. Last residual noted of 90ml. Possible advancement of TF if pt is able to tolerate currently volume/rate. Per MD note, possible proning for 12hrs daily. Abdominal Status (last documented): Intact, Hemorrhoid abdomen with Hypoactive  bowel sounds. Last BM 01/05/21. Pertinent Medications: Albumin, Pepcid, Lasix (40mg BID), Lantus (20 units), SSI (26 units 1/5, 6 units thus far 1/6), Zosyn (Q12H), LR bolus (1L 0200 1/6) Drips: Precedex (stopped 1/4), Fentanly, Heparin (stopped 0340 1/6), Versed, Levophed (2mcg/min) Pertinent Labs: Sodium 140, Potassium 4.1, BUN 84, Cr 2.65, Phos 4.3, Mag 2.6 Edema: BUE +3 pitting edema Nutrition Related Findings:  
NFPE deferred d/t isolation. Intubated and OGT placed 12/30. Current Nutrition Therapies: DIET NPO 
DIET TUBE FEEDING Current Tube Feeding (TF) Orders: · Feeding Route: Orogastric · Formula: Vital AF 1.2 · Schedule:Continuous · Regimen: 45ml/hr · Additives/Modulars: (None) · Water Flushes: 75ml/hr · Current TF & Flush Orders Provides: at goal 
· Goal TF & Flush Orders Provides: 1296 kcal (91% estimated calorie needs), 81 grams protein (110% estimated protein needs) , 2675 ml free fluid per day Current Intake: Average Meal Intake: NPO Average Supplement Intake: NPO Additional Caloric Sources: ( ) Anthropometric Measures: 
Height: 5' 5\" (165.1 cm) Current Body Wt: 77.2 kg (170 lb 3.1 oz)(1/6), Weight source: Not specified BMI: 28.3, Overweight (BMI 25.0-29. 9) Admission Body Weight: 159 lb 2.8 oz(Bedscale) Ideal Body Wt: 125 lbs (57 kg), 123.8 % Usual Body Wt: 73.5 kg (162 lb)(Per EMR 11/5/23487), Percent weight change: -4.5 Estimated Daily Nutrient Needs: 
Energy (kcal/day): 8123-8288 kcal/d (Kcal/kg(25-30), Weight Used: Ideal(57 kg)) Protein (g/day): 57-74 grams/d (1-1.3 grams/kg) Weight Used: (Ideal) Fluid (ml/day):   (1 ml/kcal) Nutrition Diagnosis:  
· Inadequate oral intake related to impaired respiratory function as evidenced by (intubated, NPO, TF to meet needs) Nutrition Interventions:  
Food and/or Nutrient Delivery: Continue NPO, Modify tube feeding Coordination of Nutrition Care: Continue to monitor while inpatient Plan of Care discussed with Alysia Galeana Goals: Previous Goal Met: Progressing toward goal(s) Active Goal: Tolerate at current lower caloric intake Nutrition Monitoring and Evaluation: Food/Nutrient Intake Outcomes: Enteral nutrition intake/tolerance Physical Signs/Symptoms Outcomes: Biochemical data, GI status, Hemodynamic status Discharge Planning: Too soon to determine Electronically signed by Stevie Ford MS, RDN, LD 1/6/2021 at 12:03 PM 
Contact: [phone] Disaster Mode active

## 2021-01-06 NOTE — PROGRESS NOTES
While providing juan care for pt a large blood clot came out from around the [address] Notified Dr. Moi Marinelli that pt was bleeding likely from hemorrhoids as there is no blood in the FM tubing. Orders are to remove FMS and stop LR bolus and give pt a total of 2 units of blood instead.

## 2021-01-06 NOTE — PROGRESS NOTES
Pharmacokinetic Consult to Pharmacist 
 
Ene Celine is a 78 y.o. female being treated for sepsis with vancomycin. Height: 5' 5\" (165.1 cm)  Weight: 77.7 kg (171 lb 4.8 oz) Lab Results Component Value Date/Time BUN 82 (H) 01/05/2021 03:13 AM  
 Creatinine 2.15 (H) 01/05/2021 03:13 AM  
 WBC 21.8 (H) 01/05/2021 12:04 PM  
 Procalcitonin 1.52 01/05/2021 07:39 AM  
 Lactic acid 1.4 12/24/2020 03:27 PM  
 Lactic Acid (POC) 1.55 09/16/2019 11:40 AM  
  
Estimated Creatinine Clearance: 20.8 mL/min (A) (based on SCr of 2.15 mg/dL (H)). CULTURES: 
pending Day 1 of vancomycin. Goal trough is 15-20. Vancomycin dose initiated at 2000 mg X 1, then will dose intermittently due to renal dysfunction. Will continue to follow patient and order levels when clinically indicated. Thank you, 
Leobardo Merlin, PharmD, BCPS Clinical Pharmacist 
[phone]

## 2021-01-06 NOTE — PROGRESS NOTES
Pt needs new Type and screen order placed as well as and additional unit of PRBC per Dr. Sara Roland. LR bolus stopped and Heparin stopped d/t Xa 1.09. Spoke to Dr. Sara Roland we will leave Heparin gtt off for now.

## 2021-01-06 NOTE — PROGRESS NOTES
Called lab for type and screen as pt needs 2 units PRBC's, per lab phlebotomist are doing morning arounds and will be by.

## 2021-01-06 NOTE — PROGRESS NOTES
Ventilator check complete; patient has a #7.5 ET tube secured at the 22 at the lip. Patient is sedated. Patient is not able to follow commands. Breath sounds are coarse and diminished. Trachea is midline, Negative for subcutaneous air, and chest excursion is symmetric. Patient is also Negative for cyanosis and is Negative for pitting edema. All alarms are set and audible. Resuscitation bag is at the head of the bed. Ventilator Settings Mode FIO2 Rate Tidal Volume Pressure PEEP I:E Ratio VC+  70 %    350 ml  12 cm H2O(Increased post ABG)  14 cm H20  1:1.86 Peak airway pressure: 33 cm H2O Minute ventilation: 9.49 l/min Rajinder Route, RT

## 2021-01-06 NOTE — PROGRESS NOTES
Ventilator check complete; patient has a #7.5 ET tube secured at the 22 at the lip. Patient is sedated. Patient is not able to follow commands. Breath sounds are coarse. Trachea is midline, Negative for subcutaneous air, and chest excursion is symmetric. Patient is also Negative for cyanosis and is Positive for pitting edema. All alarms are set and audible. Resuscitation bag is at the head of the bed. Ventilator Settings Mode FIO2 Rate Tidal Volume Pressure PEEP I:E Ratio VC+  70 % 34 350 ml  12 cm H2O(Increased post ABG)  14 cm H20  1:1.5 Peak airway pressure: 35 cm H2O Minute ventilation: 12.8 l/min ABG: Results for Sinai Brown (MRN [id number]) as of 1/6/2021 08:32 
 1/6/2021 04:34  
pH (POC) 7.22 (LL)  
pCO2 (POC) 55.8 (H)  
pO2 (POC) 65 (L) HCO3 (POC) 22.9  
sO2 (POC) 88 (L) Base deficit (POC) 4 FIO2 (POC) 70 Specimen type (POC) ARTERIAL Set Rate 25 Site RIGHT RADIAL Device: VENT Mode ASSIST CONTROL Tidal volume 350 PEEP/CPAP (POC) 14 Allens test (POC) YES Respiratory comment: PhysicianNotified Inspiratory Time 0.84 COLLECT TIME 430 Florence Lowery

## 2021-01-07 NOTE — PROGRESS NOTES
Patient has no movement to stimulus, weak cough, pupils reactive. Breath sounds coarse, tachypneic on vent, FiO2 @ 70%. NSR on monitor, S1/S2 auscultated. Bowel sounds hypoactive, abdomen intact and semi-soft. Skin with skin tear to L wrist- foam dressing in place. Sacrum intact with no breakdown, allevyn in place. Heel boots in place. Lines: R CVC Drips: levophed, fentanyl, versed Drains: coyle, OGT Versed gtt decreased due to RASS -5.

## 2021-01-07 NOTE — PROGRESS NOTES
[facility] COVID-19 Note: 
 
Critical Care Note: 1/7/2021 Carlito Schwartz Admission Date: 12/24/2020     Length of Stay: 14 days Background: 78 y.o. y/o female with acute hypoxemic respiratory failure secondary to COVID-19. Course has been complicated by need for mechanical ventilation, mucus plugging, renal failure Onset of COVID-19 symptoms: RAPID TEST POSITIVE 12/24 Notable PMH: DM, CAD, HTN, CKD 
 
24 Hour events: On vent, 70 % Fi02 Sedated with versed and fentanyl ROS: intubated, sedated Lines: (insertion date) ETT: (12/30/2020) [place]: (12/26/2020) OGT: (12/30/2020) Central line: (12/30/2020) Drips: current dose (range) Versed gtt- 6 mg/h Fentanyl  200 mcg/hr (0-300) Levophed 4 mcq Visit Vitals BP (!) 120/55 Pulse (!) 58 Temp 97.5 °F (36.4 °C) Resp (!) 34 Ht 5' 5\" (1.651 m) Wt 181 lb 7 oz (82.3 kg) SpO2 97% BMI 30.19 kg/m² Pertinent Exam:           
Constitutional:  intubated and mechanically ventilated. EENMT:  Sclera clear, pupils equal, oral mucosa moist 
Respiratory: coarse bilaterally Cardiovascular:  RRR Gastrointestinal:  soft with no tenderness; positive bowel sounds present Musculoskeletal:  warm with no cyanosis, trace lower extremity edema Skin:  no jaundice or ecchymosis Neurologic:unresponsive Psychiatric: sedated and paralyzed Intake/Output Summary (Last 24 hours) at 1/7/2021 1021 Last data filed at 1/7/2021 9235 Gross per 24 hour Intake 3011.73 ml Output 1915 ml Net 1096.73 ml CXR 
 
 
TTE 12/31/2020 LV systolic function normal with EF 60 to 65%. Diastolic dysfunction noted. RVSP 45-50. Valves normal. 
 
Pertinent Labs:  
Recent Labs 01/07/21 
0325 01/06/21 
1632 01/06/21 
0202 01/05/21 
1204 WBC 10.5  --  13.4* 21.8* HGB 8.6* 8.5* 6.2* 7.1*  
HCT 26.5* 27.3* 19.9* 22.9*  
  --  120* 153 Recent Labs 01/07/21 
1328 01/06/21 
0202 01/05/21 
0027  140 142  
K 4.0 4.1 3.8 * 110* 112* CO2 23 25 25 * 161* 206* BUN 86* 84* 82* CREA 2.99* 2.65* 2.15* MG  --  2.6*  --   
CA 8.3 8.0* 8.2* PHOS  --  4.3*  --   
ALB  --  3.0*  --   
TBILI  --  0.7  --   
ALT  --  29  --   
 
Recent Labs 01/07/21 
0541 01/06/21 
2342 01/06/21 
1733 GLUCPOC 215* 247* 209* SARS-CoV-2 LAB Results Rapid positive 12/24 MICRO Date Source Result 12/24  blood  NG 5 days 12/24  blood  NG 5 days 12/24  urine   K E. coli & mixed skin farheen  
12/26  urine >100K candida krusei COVID-19 Meds: 
Dexamethasone 6mg daily (12/24-1/2) Convalescent plasma transfusion (12/24) Anti-infectives (start date): 
None current Ventilator Settings:  5' 5\" (1.651 m) Mode FIO2 Rate Tidal Volume Pressure PEEP  
VC+  70 %    350 ml  12 cm H2O(Increased post ABG)  14 cm H20 Peak airway pressure: 35 cm H2O Minute ventilation: 12.9 l/min ABG:  
Recent Labs 01/07/21 
6990 01/06/21 
0434 01/05/21 
1114 PHI 7.31* 7.22* 7.27* PCO2I 41.8 55.8* 51.6*  
PO2I 99 65* 247* HCO3I 21.0* 22.9 23.8 Impression: 78 y.o. y/o female with acute hypoxemic respiratory failure secondary to COVID-19. NEURO:  
1. Sedation: Versed gtt 2. Analgesia: Fentanyl gtt 3. Paralytics: may be needed to confirm low tidal volume ventilation can be maintained. CV: 1. Volume Status: avoid fluids when possible. Will give lasix/albumin today. 2. Hypotension:  Check d-dimer, monitor CVP PULM:  
1. Acute hypoxemic/hypercapneic respiratory failure:  Titrate Min Vent to pH/CO2. Day # 6 on ventilator 2. Severe ARDS 2/2 COVID: Low Tidal Volume ventilation, goal 6cc per kg (Ideal body weight: 57 kg (125 lb 10.6 oz) x 6 = 348ml). Attempt to limit driving pressure to 76EW/J4M or less. Allow permissive hypercapnia/acidosis to pH 7.25 if needed to achieve above. Consider proning for 12 hours daily  severe ARDS with P:F <150. Consider inhaled Flolan if P:F<100. Paralytics if needed for dyssynchrony. RENAL: 
1. PRATIMA: monitor. GI: NPO, PPI prophy 1. Nutrition: continue TF 
HEME: no acute issues 1. Thrombocytopenia: platelets slowly dropping. Sent HIT Ab 
2. Anticoagulation: d-dimer to determine anticoagulation ppx for VTE. ID: WBC elevated, afebrile. 1. COVID-19: COVID meds as above, multi-D rounds with pharmacy to optimize meds with noted contraindications ENDO: 
1. DM: on lantus 20U daily with hyperglycemia. stopping D5 now so will monitor. Skin: no decub, turns, preventive care Prophy:GI/DVT Full Code Family updated by phone after rounds. Herbert  Called daughter Tavo Rainey [phone] with update Chidi Camarena can be reached at [phone]. The patient is critically ill with respiratory failure, circulatory failure and requires high complexity decision making for assessment and support including frequent ventilator adjustment , frequent evaluation and titration of therapies , application of advanced monitoring technologies and extensive interpretation of multiple databases. Cumulative time devoted to patient care services by me for day of service -40 min Emily Wiggins MD

## 2021-01-07 NOTE — PROGRESS NOTES
Ventilator check complete; patient has a #7.5 ET tube secured at the 22 at the lip. Patient is sedated. Patient is not able to follow commands. Breath sounds are coarse. Trachea is midline, Negative for subcutaneous air, and chest excursion is symmetric. Patient is also Negative for cyanosis and is Negative for pitting edema. All alarms are set and audible. Resuscitation bag is at the head of the bed. Ventilator Settings Mode FIO2 Rate Tidal Volume Pressure PEEP I:E Ratio VC+  70 %   34 350 ml  12 cm H2O(Increased post ABG)  14 cm H20  1:1.5 Peak airway pressure: 34 cm H2O Minute ventilation: 12.9 l/min Alyssa More, RT

## 2021-01-07 NOTE — PROGRESS NOTES
[facility] PROGRESS NOTE Follow up for: PRATIMA Subjective:  
Patient seen and examined in [facility] ICU remains intubated, sedated on 70 % FiO2, 14 PEEP Labs and chart reviewed,  ICU RN good uop last 24 hours, Cr up some, s/p 2 units PRBC 1/6, hgb 6.2->8.6 ROS: 
Unable to obtain Objective:  
Exam: 
Vitals:  
 01/07/21 1000 01/07/21 1015 01/07/21 1030 01/07/21 1050 BP: (!) 120/55 (!) 118/56 (!) 118/55 (!) 90/43 Pulse: (!) 58 60 (!) 59 60 Resp: (!) 34 (!) 33 (!) 33 (!) 34 Temp:      
SpO2: 97% 97% 97% (!) 86% Weight:      
Height:      
 
 
 
Intake/Output Summary (Last 24 hours) at 1/7/2021 1109 Last data filed at 1/7/2021 1052 Gross per 24 hour Intake 3011.73 ml Output 2100 ml Net 911.73 ml Current Facility-Administered Medications Medication Dose Route Frequency  0.9% sodium chloride infusion 250 mL  250 mL IntraVENous PRN  
 0.9% sodium chloride infusion 250 mL  250 mL IntraVENous PRN  piperacillin-tazobactam (ZOSYN) 3.375 g in 0.9% sodium chloride (MBP/ADV) 100 mL MBP  3.375 g IntraVENous Q12H  
 heparin (porcine) injection 5,000 Units  5,000 Units SubCUTAneous Q8H  
 midazolam in normal saline (VERSED) 1 mg/mL infusion  0-10 mg/hr IntraVENous TITRATE  fentaNYL in normal saline (pf) 25 mcg/mL infusion  0-200 mcg/hr IntraVENous TITRATE  furosemide (LASIX) injection 40 mg  40 mg IntraVENous Q12H  
 insulin regular (NOVOLIN R, HUMULIN R) injection   SubCUTAneous Q6H  
 NOREPINephrine (LEVOPHED) 4 mg in 5% dextrose 250 mL infusion  0.5-30 mcg/min IntraVENous TITRATE  sodium chloride 3% hypertonic nebulizer soln  4 mL Nebulization BID  Vancomycin Pharmacy Intermittent Dosing   Other Rx Dosing/Monitoring  dexmedeTOMidine (PRECEDEX) 400 mcg in 0.9% sodium chloride 100 mL infusion  0.1-1.5 mcg/kg/hr IntraVENous TITRATE  fentaNYL citrate (PF) injection 50 mcg  50 mcg IntraVENous Q2H PRN  
  bisacodyL (DULCOLAX) suppository 10 mg  10 mg Rectal DAILY PRN  
 insulin glargine (LANTUS) injection 20 Units  20 Units SubCUTAneous DAILY  [Held by provider] polyethylene glycol (MIRALAX) packet 17 g  17 g Oral DAILY  [Held by provider] sodium zirconium cyclosilicate (LOKELMA) powder packet 5 g  5 g Oral DAILY  [Held by provider] senna-docusate (PERICOLACE) 8.6-50 mg per tablet 2 Tab  2 Tab Per NG tube DAILY  NUTRITIONAL SUPPORT ELECTROLYTE PRN ORDERS   Does Not Apply PRN  
 [Held by provider] hydrALAZINE (APRESOLINE) tablet 10 mg  10 mg Per NG tube TID  famotidine (PF) (PEPCID) 20 mg in 0.9% sodium chloride 10 mL injection  20 mg IntraVENous DAILY  dextrose 40% (GLUTOSE) oral gel 1 Tube  15 g Oral PRN  
 glucagon (GLUCAGEN) injection 1 mg  1 mg IntraMUSCular PRN  
 dextrose (D50W) injection syrg 12.5-25 g  25-50 mL IntraVENous PRN  
 albuterol (PROVENTIL HFA, VENTOLIN HFA, PROAIR HFA) inhaler 2 Puff  2 Puff Inhalation Q4H PRN  
 hydrALAZINE (APRESOLINE) 20 mg/mL injection 20 mg  20 mg IntraVENous Q2H PRN  phenol throat spray (CHLORASEPTIC) 1 Spray  1 Spray Oral PRN  
 lip protectant (BLISTEX) ointment 1 Each  1 Each Topical PRN  
 acetaminophen (TYLENOL) tablet 650 mg  650 mg Oral Q6H PRN  
 0.9% sodium chloride infusion 250 mL  250 mL IntraVENous PRN  
 sodium chloride (NS) flush 5-40 mL  5-40 mL IntraVENous Q8H  
 sodium chloride (NS) flush 5-40 mL  5-40 mL IntraVENous PRN  
 albuterol (PROVENTIL VENTOLIN) nebulizer solution 2.5 mg  2.5 mg Nebulization Q6H RT  
 
 
Physical EXAM  
GEN - intubated, sedated on vent CV - Regular rate, S1, S2, no Rub Lung - coarse bilaterally Abd -  Soft, non tender, + BS Ext - no edema Recent Labs 01/07/21 
0325 01/06/21 
1632 01/06/21 
0202 01/05/21 
1204 WBC 10.5  --  13.4* 21.8* HGB 8.6* 8.5* 6.2* 7.1*  
HCT 26.5* 27.3* 19.9* 22.9*  
  --  120* 153 Recent Labs 01/07/21 
1764 01/06/21 
0202 01/05/21 
8828  140 142  
K 4.0 4.1 3.8 * 110* 112* CO2 23 25 25 BUN 86* 84* 82* CREA 2.99* 2.65* 2.15* CA 8.3 8.0* 8.2*  
* 161* 206* MG  --  2.6*  --   
PHOS  --  4.3*  --   
 
 
Assessment and Plan: 1. PRATIMA over CKD Possibly COVID ATN , non oliguric Baseline 1 to 1.5 , admitted with Cr of 2.8 on 12/24  
-Echo from 2019 - Normal EF and systolic function  
likely pre renal component as well- hgb 6.2, Creatinine trending up Cr 2.99, non oliguric. Decrease lasix for now  
- on vancomycin level 16.6 2. Hyperkalemia- resolved 3. Hypernatremia - improved  
  
4. COVID 19 +/acute respiratory failure/ARDs ventilated per pulm 5.  Anemia s/p 2 units PRBC 1/6 
 
 
   
Lisa Cheng, NP

## 2021-01-07 NOTE — PROGRESS NOTES
Ventilator check complete; patient has a #7.5 ET tube secured at the 22 at the lip. Patient is sedated. Patient is not able to follow commands. Breath sounds are coarse. Trachea is midline, Negative for subcutaneous air, and chest excursion is symmetric. Patient is also Negative for cyanosis and is Positive for pitting edema. All alarms are set and audible. Resuscitation bag is at the head of the bed. Ventilator Settings Mode FIO2 Rate Tidal Volume Pressure PEEP I:E Ratio VC+  70 % 34 350 ml  12 cm H2O(Increased post ABG)  14 cm H20  1:1.5 Peak airway pressure: 35 cm H2O Minute ventilation: 12.9 l/min ABG:Results for Benjamin Loja (MRN [id number]) as of 1/7/2021 07:59 
 1/7/2021 03:32  
pH (POC) 7.31 (L)  
pCO2 (POC) 41.8  
pO2 (POC) 99 HCO3 (POC) 21.0 (L)  
sO2 (POC) 97 Base deficit (POC) 5 FIO2 (POC) 70 Specimen type (POC) ARTERIAL Set Rate 34 Site RIGHT RADIAL Device: VENT Mode ASSIST CONTROL Tidal volume 350 PEEP/CPAP (POC) 14 Allens test (POC) YES Respiratory comment: NurseNotified Inspiratory Time 0.7 COLLECT TIME 320 Zohra Bhagat

## 2021-01-08 NOTE — PROGRESS NOTES
Pharmacokinetic Consult to Pharmacist 
 
Roxy Marisol is a 78 y.o. female being treated for sepsis with vancomycin. Height: 5' 5\" (165.1 cm)  Weight: 83.1 kg (183 lb 3.2 oz) Lab Results Component Value Date/Time BUN 94 (H) 01/08/2021 03:45 AM  
 Creatinine 3.31 (H) 01/08/2021 03:45 AM  
 WBC 10.8 01/08/2021 03:45 AM  
 Procalcitonin 1.52 01/05/2021 07:39 AM  
 Lactic acid 1.4 12/24/2020 03:27 PM  
 Lactic Acid (POC) 1.55 09/16/2019 11:40 AM  
  
Estimated Creatinine Clearance: 13.9 mL/min (A) (based on SCr of 3.31 mg/dL (H)). Lab Results Component Value Date/Time Vancomycin,trough 15.3 09/06/2019 06:45 AM  
 Vancomycin, random 23.3 01/08/2021 03:45 AM  
 
 
Day 4 of vancomycin. Goal trough is 15-20. Random level is above goal range. No dose needed at this time. Continue to dose intermittently given PRATIMA. Further levels will be ordered as clinically indicated. Pharmacy will continue to follow. Please call with any questions. Thank you, Dary Cardoso, PharmD, [facility] Clinical Pharmacist 
[phone]

## 2021-01-08 NOTE — PROGRESS NOTES
Bedside shift change report given to Octavio Watkins RN (oncoming nurse) by Shivani Schwartz RN (offgoing nurse). Report included the following information SBAR, ED Summary, Intake/Output, MAR and Recent Results.

## 2021-01-08 NOTE — PROGRESS NOTES
Ventilator check complete; patient has a #7.5 ET tube secured at the 22 at the lip. Patient is not sedated. Patient is not able to follow commands. Breath sounds are coarse. Trachea is midline, Negative for subcutaneous air, and chest excursion is symmetric. Patient is also Negative for cyanosis and is Negative for pitting edema. All alarms are set and audible. Resuscitation bag is at the head of the bed. Ventilator Settings Mode FIO2 Rate Tidal Volume Pressure PEEP I:E Ratio VC+  70 %   34 350 ml  12 cm H2O(Increased post ABG)  14 cm H20  1:1.5 Peak airway pressure: 39 cm H2O Minute ventilation: 13 l/min Amira Hastings , RRT

## 2021-01-08 NOTE — PROGRESS NOTES
Ventilator check complete; patient has a #7.5 ET tube secured at the 22 at the lip. Patient is sedated. Patient is not able to follow commands. Breath sounds are coarse. Trachea is midline, Negative for subcutaneous air, and chest excursion is symetric. Patient is also Negative for cyanosis and is Negative for pitting edema. All alarms are set and audible. Resuscitation bag is at the head of the bed. Ventilator Settings Mode FIO2 Rate Tidal Volume Pressure PEEP I:E Ratio VC+  70 %   34 350 ml    14 cm H20  1:1.5 Peak airway pressure: 39 cm H2O Minute ventilation: 13 l/min ABG:  
 
 
Nkechi Alvarez RT

## 2021-01-08 NOTE — PROGRESS NOTES
Ventilator check complete; patient has a #7.5 ET tube secured at the 23 at the lip. Patient is sedated. Patient is not able to follow commands. Breath sounds are clear. Trachea is midline, Negative for subcutaneous air, and chest excursion is symmetric. Patient is also Negative for cyanosis and is Positive for pitting edema. All alarms are set and audible. Resuscitation bag is at the head of the bed. Ventilator Settings Mode FIO2 Rate Tidal Volume Pressure PEEP I:E Ratio VC+  90 %(Found and weaned from 100%)   34 350 ml   14 cm H20  1:1.51 Peak airway pressure: 39 cm H2O Minute ventilation: 12.8 l/min ABG: No results for input(s): PH, PCO2, PO2, HCO3 in the last 72 hours. Joleen Fregoso

## 2021-01-08 NOTE — PROGRESS NOTES
[facility] COVID-19 Note: 
 
Critical Care Note: 1/8/2021 [facility] Reusing Admission Date: 12/24/2020     Length of Stay: 15 days Background: 78 y.o. y/o female with acute hypoxemic respiratory failure secondary to COVID-19. Course has been complicated by need for mechanical ventilation, mucus plugging, renal failure Onset of COVID-19 symptoms: RAPID TEST POSITIVE 12/24 Notable PMH: DM, CAD, HTN, CKD 
 
24 Hour events: On Vent 90%, slightly improved levo since starting at 9 yesterday ROS: intubated, sedated Lines: (insertion date) Quad Lumen 12/30/20 Anterior; Left Internal jugular Orogastric Tube 12/30/20 Urinary Catheter 01/05/21 [place] Airway - Endotracheal Tube 12/30/20 Oral 
Wound Wrist Left;Dorsal 12/31/20 Wound Ankle Anterior Drips: current dose (range) 
  dexmedeTOMidine (PRECEDEX) 400 mcg in 0.9% sodium chloride 100 mL infusion    
  0.1-1.5 mcg/kg/hr, IV, TITRATE    
  Last Action:  Stopped, 01/04 1627    
  fentaNYL in normal saline (pf) 25 mcg/mL infusion    
  0-200 mcg/hr, IV, TITRATE    
  Last Action:  New Bag, 200 mcg/hr at 01/08 0115    
  midazolam in normal saline (VERSED) 1 mg/mL infusion    
  0-10 mg/hr, IV, TITRATE    
  Last Action:  Rate Verify, 3 mg/hr at 01/07 1905    
  NOREPINephrine (LEVOPHED) 4 mg in 5% dextrose 250 mL infusion    
  0.5-30 mcg/min, IV, TITRATE    
  Last Action:  Rate Change, 6 mcg/min at 01/08 8684 Visit Vitals BP (!) 110/53 Pulse 71 Temp 98 °F (36.7 °C) Resp (!) 33 Ht 5' 5\" (1.651 m) Wt 183 lb 3.2 oz (83.1 kg) SpO2 96% BMI 30.49 kg/m² Pertinent Exam:           
Constitutional:  intubated and mechanically ventilated. EENMT:  Sclera clear, pupils equal, oral mucosa moist 
Respiratory: coarse bilaterally Cardiovascular:  RRR Gastrointestinal:  soft with no tenderness; positive bowel sounds present Musculoskeletal:  warm with no cyanosis, trace lower extremity edema Skin:  no jaundice or ecchymosis Neurologic:unresponsive Psychiatric: sedated and paralyzed Intake/Output Summary (Last 24 hours) at 1/8/2021 1048 Last data filed at 1/8/2021 5709 Gross per 24 hour Intake 1927.18 ml Output 825 ml Net 1102.18 ml CXR: no change (1/6 last) TTE 12/31/2020 LV systolic function normal with EF 60 to 65%. Diastolic dysfunction noted. RVSP 45-50. Valves normal. 
 
Pertinent Labs:  
Recent Labs 01/08/21 
0345 01/07/21 
2172 01/06/21 
1632 01/06/21 
0202 WBC 10.8 10.5  --  13.4* HGB 8.6* 8.6* 8.5* 6.2* HCT 26.6* 26.5* 27.3* 19.9*  
 154  --  120* Recent Labs 01/08/21 
0345 01/07/21 
0325 01/06/21 
0202  141 140  
K 4.5 4.0 4.1 * 110* 110* CO2 24 23 25 * 201* 161* BUN 94* 86* 84* CREA 3.31* 2.99* 2.65* MG 2.6*  --  2.6*  
CA 8.6 8.3 8.0*  
PHOS 4.9*  --  4.3* ALB  --   --  3.0* TBILI  --   --  0.7 ALT  --   --  29 Recent Labs 01/08/21 
0506 01/08/21 
0030 01/07/21 
1712 GLUCPOC 282* 227* 220* SARS-CoV-2 LAB Results Rapid positive 12/24 MICRO Date Source Result 12/24  blood  NG 5 days 12/24  blood  NG 5 days 12/24  urine   K E. coli & mixed skin farheen  
12/26  urine >100K candida krusei COVID-19 Meds: 
Dexamethasone 6mg daily (12/24-1/2) Convalescent plasma transfusion (12/24) Anti-infectives (start date): 
Vanc, Zosyn 1/5- Ventilator Settings:  5' 5\" (1.651 m) Mode FIO2 Rate Tidal Volume Pressure PEEP  
VC+  90 %(Found and weaned from 100%)    350 ml  12 cm H2O(Increased post ABG)  14 cm H20 Peak airway pressure: 39 cm H2O Minute ventilation: 12.8 l/min ABG:  
Recent Labs 01/08/21 
7042 01/07/21 
3729 01/06/21 
0434 01/05/21 
1114 PHI  --  7.31* 7.22* 7.27* PCO2I  --  41.8 55.8* 51.6*  
PO2I  --  99 65* 247* HCO3I 25.0 21.0* 22.9 23.8 Impression: 78 y.o. y/o female with acute hypoxemic respiratory failure secondary to COVID-19.  
NEURO:  
 1. Sedation: Versed gtt 2. Analgesia: Fentanyl gtt 3. Paralytics: may be needed to confirm low tidal volume ventilation can be maintained. CV: 1. Volume Status: avoid fluids when possible. Will give lasix/albumin today. 2. Hypotension: improving levo dose PULM:  
1. Acute hypoxemic/hypercapneic respiratory failure:  Titrate Min Vent to pH/CO2. Day # 7 on ventilator 2. Severe ARDS 2/2 COVID: Low Tidal Volume ventilation, goal 6cc per kg (Ideal body weight: 57 kg (125 lb 10.6 oz) x 6 = 348ml). Attempt to limit driving pressure to 26KB/I1X or less. Allow permissive hypercapnia/acidosis to pH 7.25 if needed to achieve above. Consider proning for 12 hours daily  severe ARDS with P:F <150. Consider inhaled Flolan if P:F<100. Paralytics if needed for dyssynchrony. RENAL: 
1. PRATIMA: monitor. Nephro following GI: NPO, PPI prophy 1. Nutrition: continue TF 
HEME: no acute issues 1. Thrombocytopenia: stabilized. HIT negative 2. Anticoagulation: d-dimer to determine anticoagulation ppx for VTE. ID: WBC elevated, afebrile. 1. COVID-19: COVID meds as above. Restart Dexa course 1/8-> 
2. MRSA Pneumonia: stop Zosyn, will continue Vanc. Monitor cultures. ENDO: 
1. DM: on lantus 20U daily with hyperglycemia. stopping D5 now so will monitor. Skin: no decub, turns, preventive care Prophy: Heparin/H2B Full Code Family updated by phone after rounds Daughter Abby Reyna [phone] with update Kurt Michaud can be reached at [phone] - spoke to her this morning for update. The patient is critically ill with respiratory failure, circulatory failure and requires high complexity decision making for assessment and support including frequent ventilator adjustment , frequent evaluation and titration of therapies , application of advanced monitoring technologies and extensive interpretation of multiple databases. Cumulative time devoted to patient care services by me for day of service -32 min Michelle Kaur MD

## 2021-01-08 NOTE — PROGRESS NOTES
[facility] PROGRESS NOTE Follow up for: PRATIMA Subjective:  
Patient seen and examined in [facility] ICU remains intubated, sedated on 80 % FiO2, 14 PEEP, some hypotension now on levophed. Labs and chart reviewed, renal function worsening, non oliguric, s/p 2 units PRBC 1/6, hgb 6.2->8.6 ROS: 
Unable to obtain Objective:  
Exam: 
Vitals:  
 01/08/21 0945 01/08/21 1001 01/08/21 1016 01/08/21 1030 BP: (!) 112/54 (!) 111/52 (!) 110/54 (!) 110/53 Pulse: 71 68 71 71 Resp: (!) 34 (!) 34 (!) 34 (!) 33 Temp:      
SpO2: 97% 96% 96% 96% Weight:      
Height:      
 
 
 
Intake/Output Summary (Last 24 hours) at 1/8/2021 1158 Last data filed at 1/8/2021 8303 Gross per 24 hour Intake 1927.18 ml Output 640 ml Net 1287.18 ml Current Facility-Administered Medications Medication Dose Route Frequency  polyethylene glycol (MIRALAX) packet 17 g  17 g Per NG tube EVERY OTHER DAY  senna-docusate (PERICOLACE) 8.6-50 mg per tablet 2 Tab  2 Tab Per NG tube NOW  dexamethasone (DECADRON) 10 mg/mL injection 6 mg  6 mg IntraVENous Q24H  
 furosemide (LASIX) injection 20 mg  20 mg IntraVENous Q12H  
 NOREPINephrine (LEVOPHED) 4 mg in 5% dextrose 250 mL infusion  0.5-30 mcg/min IntraVENous TITRATE  
 0.9% sodium chloride infusion 250 mL  250 mL IntraVENous PRN  
 0.9% sodium chloride infusion 250 mL  250 mL IntraVENous PRN  
 heparin (porcine) injection 5,000 Units  5,000 Units SubCUTAneous Q8H  
 midazolam in normal saline (VERSED) 1 mg/mL infusion  0-10 mg/hr IntraVENous TITRATE  fentaNYL in normal saline (pf) 25 mcg/mL infusion  0-200 mcg/hr IntraVENous TITRATE  insulin regular (NOVOLIN R, HUMULIN R) injection   SubCUTAneous Q6H  
 sodium chloride 3% hypertonic nebulizer soln  4 mL Nebulization BID  Vancomycin Pharmacy Intermittent Dosing   Other Rx Dosing/Monitoring  dexmedeTOMidine (PRECEDEX) 400 mcg in 0.9% sodium chloride 100 mL infusion  0.1-1.5 mcg/kg/hr IntraVENous TITRATE  fentaNYL citrate (PF) injection 50 mcg  50 mcg IntraVENous Q2H PRN  
 bisacodyL (DULCOLAX) suppository 10 mg  10 mg Rectal DAILY PRN  
 insulin glargine (LANTUS) injection 20 Units  20 Units SubCUTAneous DAILY  [Held by provider] sodium zirconium cyclosilicate (LOKELMA) powder packet 5 g  5 g Oral DAILY  senna-docusate (PERICOLACE) 8.6-50 mg per tablet 2 Tab  2 Tab Per NG tube DAILY  NUTRITIONAL SUPPORT ELECTROLYTE PRN ORDERS   Does Not Apply PRN  
 [Held by provider] hydrALAZINE (APRESOLINE) tablet 10 mg  10 mg Per NG tube TID  famotidine (PF) (PEPCID) 20 mg in 0.9% sodium chloride 10 mL injection  20 mg IntraVENous DAILY  dextrose 40% (GLUTOSE) oral gel 1 Tube  15 g Oral PRN  
 glucagon (GLUCAGEN) injection 1 mg  1 mg IntraMUSCular PRN  
 dextrose (D50W) injection syrg 12.5-25 g  25-50 mL IntraVENous PRN  
 albuterol (PROVENTIL HFA, VENTOLIN HFA, PROAIR HFA) inhaler 2 Puff  2 Puff Inhalation Q4H PRN  
 hydrALAZINE (APRESOLINE) 20 mg/mL injection 20 mg  20 mg IntraVENous Q2H PRN  phenol throat spray (CHLORASEPTIC) 1 Spray  1 Spray Oral PRN  
 lip protectant (BLISTEX) ointment 1 Each  1 Each Topical PRN  
 acetaminophen (TYLENOL) tablet 650 mg  650 mg Oral Q6H PRN  
 0.9% sodium chloride infusion 250 mL  250 mL IntraVENous PRN  
 sodium chloride (NS) flush 5-40 mL  5-40 mL IntraVENous Q8H  
 sodium chloride (NS) flush 5-40 mL  5-40 mL IntraVENous PRN  
 albuterol (PROVENTIL VENTOLIN) nebulizer solution 2.5 mg  2.5 mg Nebulization Q6H RT  
 
 
Physical EXAM  
GEN - intubated, sedated on vent CV - Regular rate, S1, S2, no Rub Lung - coarse bilaterally Abd -  Soft, non tender, + BS Ext - no edema Recent Labs 01/08/21 
0345 01/07/21 
7152 01/06/21 
1632 01/06/21 
0202 WBC 10.8 10.5  --  13.4* HGB 8.6* 8.6* 8.5* 6.2* HCT 26.6* 26.5* 27.3* 19.9*  
  154  --  120* Recent Labs 01/08/21 
0345 01/07/21 
0325 01/06/21 
0202  141 140  
K 4.5 4.0 4.1 * 110* 110* CO2 24 23 25 BUN 94* 86* 84* CREA 3.31* 2.99* 2.65* CA 8.6 8.3 8.0*  
* 201* 161* MG 2.6*  --  2.6* PHOS 4.9*  --  4.3* Assessment and Plan: 1. PRATIMA over CKD Possibly COVID ATN , non oliguric Baseline 1 to 1.5 , admitted with Cr of 2.8 on 12/24  
-Echo from 2019 - Normal EF and systolic function  
likely pre renal component and vanc level up 23.3 Creatinine trending up Cr 3.31, non oliguric. Hold lasix add gentle IVF for next 24 hours 2. Hyperkalemia- resolved 3. Hypernatremia - improved  
  
4. COVID 19 +/acute respiratory failure/ARDs ventilated per pulm 5.  Anemia s/p 2 units PRBC 1/6 
 
 
   
Tavia Camejo, NP

## 2021-01-08 NOTE — PROGRESS NOTES
Comprehensive Nutrition Assessment Type and Reason for Visit: Mireille Guy This assessment was completed remotely from within the hospital. 
 
Nutrition Recommendations/Plan:  
Enteral Nutrition:  
Vital AF 1.2 via OGT progress to goal rate of 50ml/hr. Water flush 80ml Q4H. At goal will provide 1440 kcal (100% estimated calorie needs), 90 grams protein (100% estimated protein needs) and 1453ml free fluid (1ml/kcal). Vitamin and Mineral Supplement Therapy: 
Electrolyte management replacement protocol active. Labs: 
? BMP daily, Mg and Phos MWF.  
? Restart Pericolace (2 tabs daily) and Miralax (every other day). One time dose dulcolax suppository today Malnutrition Assessment: 
Malnutrition Status: Insufficient data Nutrition Assessment:  
Nutrition History: 12/31: 3 recorded meals in past 6 days with average intake of 83% Nutrition Background: H/O: CAD, HTN, DM, HLD, peripheral neuropathy, CKD stage III. Presented with dyspnea. Findings of +COVID. Admitted to ICU with ARDS, severe acute hypoxic due to COVID PNA, PRATIMA on CKD. Was requiring BIPAP at night and Aviro during day. Had brief cardiac arrest and was intubated 12/30 Daily Update: 
Pt continues with intermittent elevated residuals. Noted residual of 300ml on 1/7. Unable to provide Reglan per pharmacy. RN reports gastric residual of 150ml this AM. TF continue at 45ml/hr. Will advance to better meet estimated nutrition needs today. Abdominal Status (last documented): Intact, Hemorrhoid, Semi-soft abdomen with Hypoactive  bowel sounds. Last BM 01/06/21. Pertinent Medications: Pepcid, Lasix (reduced to 20mg BID), Lantus (20 units), SSI (16 units 1/7, 10 units thus far today), Zosyn Drips: Fentanyl, Versed, Levophed (6mcg/min at time of assessment) Pertinent Labs: Potassium 4.5, Glucose 252, POC 1/7 (215-226), BUN 94, Cr 3.31, Phos 4.9, Magnesium 2.6, GFR 17 Edema: BUE: +2, BLE: trace Nutrition Related Findings: NFPE deferred d/t isolation. Intubated and OGT placed 12/30. Current Nutrition Therapies: DIET NPO 
DIET TUBE FEEDING Current Tube Feeding (TF) Orders: · Feeding Route: Orogastric · Formula: Vital AF 1.2 · Schedule:Continuous · Regimen: 45ml/hr · Additives/Modulars: (None) · Water Flushes: 70ml Q4H 
· Current TF & Flush Orders Provides: at goal 
· Goal TF & Flush Orders Provides: 1296 kcal (91% estimated calorie needs), 81 grams protein (110% estimated protein needs) , 2675 ml free fluid per day Current Intake: Average Meal Intake: NPO Average Supplement Intake: NPO Additional Caloric Sources: ( ) Anthropometric Measures: 
Height: 5' 5\" (165.1 cm) Current Body Wt: 83.1 kg (183 lb 3.2 oz)(1/7), Weight source: Bed scale BMI: 30.5, Obese class 1 (BMI 30.0-34. 9) Admission Body Weight: 159 lb 2.8 oz(Bedscale) Ideal Body Wt: 125 lbs (57 kg), 123.8 % Usual Body Wt: 73.5 kg (162 lb)(Per EMR 11/5/82020), Percent weight change: -4.5 Estimated Daily Nutrient Needs: 
Energy (kcal/day): 4833-3302 kcal/d (Kcal/kg(25-30), Weight Used: Ideal(57 kg)) Protein (g/day): 57-74 grams/d (1-1.3 grams/kg) Weight Used: (Ideal) Fluid (ml/day):   (1 ml/kcal) Nutrition Diagnosis:  
· Inadequate oral intake related to impaired respiratory function as evidenced by (intubated, NPO, TF to meet needs) Nutrition Interventions:  
Food and/or Nutrient Delivery: Continue NPO, Modify tube feeding Coordination of Nutrition Care: Continue to monitor while inpatient Plan of Care discussed with Carlin Cotto Goals: Previous Goal Met: Progressing toward goal(s) Active Goal: Tolerate and maintain nutrition needs at goal rate Nutrition Monitoring and Evaluation:  
  
Food/Nutrient Intake Outcomes: Enteral nutrition intake/tolerance Physical Signs/Symptoms Outcomes: Biochemical data, GI status Discharge Planning: Too soon to determine Electronically signed by Tran dHz MS, RDN, LD 1/8/2021 at 11:07 AM 
Contact: [phone] Disaster Mode active

## 2021-01-08 NOTE — PROGRESS NOTES
Covid precautions in place  did not visit with patient Spoke with staff Reviewed notes for spiritual concerns [employer] LOY 
 
RETIRED  
 
HAS CAREGIVER WHO COMES TO HOUSE INTUBATED REVIEWED CURRENT MEWS SCORE OF 6 Prayer offered for patient's healing and protection STAFF IS PROVIDING VERY COMPASSIONATE CARE

## 2021-01-09 NOTE — PROGRESS NOTES
Pt with fsbs 376. Dr Jorge Freeman notified and orders for 20 units lantus and an extra 10 units regular insulin to be given in conjunction with sliding scale.

## 2021-01-09 NOTE — PROGRESS NOTES
Orders to prone patient for 16 hours. Patient repositioned into prone position with the help of Flor RT. All lines and tubes intact and at same placement as prior to proning. Pt tolerating prone position well.

## 2021-01-09 NOTE — PROGRESS NOTES
Ventilator check complete; patient has a #7.5 ET tube secured at the 23 at the lip. Patient is not sedated. Patient is not able to follow commands. Breath sounds are diminished. Trachea is midline, Negative for subcutaneous air, and chest excursion is asymmetric  - greater on the left. Patient is also Negative for cyanosis and is Negative for pitting edema. All alarms are set and audible. Resuscitation bag is at the head of the bed. Ventilator Settings Mode FIO2 Rate Tidal Volume Pressure PEEP I:E Ratio VC+  60 %   34 350 ml  12 cm H2O(Increased post ABG)  12 cm H20  1:1.5 Peak airway pressure: 23 cm H2O Minute ventilation: 13 l/min Paris Bill, RT

## 2021-01-09 NOTE — PROGRESS NOTES
Antonio Martin 149 COVID-19 Note: 
 
Critical Care Note: 1/9/2021 Chemo Reusing Admission Date: 12/24/2020     Length of Stay: 16 days Background: 78 y.o. y/o female with acute hypoxemic respiratory failure secondary to COVID-19. Onset of COVID-19 symptoms: 12/24 Notable PMH: DM, CAD, HTN, CKD 
 
24 Hour events:  On vent,on 80 % Fi02 ROS: intubated, sedated Lines: (insertion date) Quad Lumen 12/30/20 Anterior; Left Internal jugular Orogastric Tube 12/30/20 Urinary Catheter 01/05/21 [place] Airway - Endotracheal Tube 12/30/20 Oral 
Wound Wrist Left;Dorsal 12/31/20 Wound Ankle Anterior Drips: current dose (range) Versed   3 mg/hr (0-10) Fentanyl  200 mcg/hr (0-300) 4)Visit Vitals BP (!) 102/48 Pulse 67 Temp 97.4 °F (36.3 °C) Resp (!) 34 Ht 5' 5\" (1.651 m) Wt 179 lb 14.3 oz (81.6 kg) SpO2 97% BMI 29.94 kg/m² Pertinent Exam:           
Constitutional:  intubated and mechanically ventilated. EENMT:  Sclera clear, pupils equal, oral mucosa moist 
Respiratory: coarse Cardiovascular:  RRR, Gastrointestinal:  soft with no tenderness; positive bowel sounds present Musculoskeletal:  warm with no cyanosis, plus 2 lower extremity edema Skin:  no jaundice or ecchymosis Neurologic:sedated Psychiatric: sedated and paralyzed CXR Pertinent Labs:  
Recent Labs 01/09/21 
6569 01/08/21 
0345 01/07/21 
8645 WBC 10.2 10.8 10.5 HGB 8.5* 8.6* 8.6* HCT 27.2* 26.6* 26.5*  
 154 154 Recent Labs 01/09/21 
9408 01/08/21 
0345 01/07/21 
1120  137 141  
K 5.0 4.5 4.0  
* 109* 110* CO2 21 24 23 * 252* 201* * 94* 86* CREA 3.59* 3.31* 2.99* MG  --  2.6*  --   
CA 8.7 8.6 8.3 PHOS  --  4.9*  --   
 
Recent Labs 01/09/21 
1133 01/09/21 
1008 01/09/21 
0351 GLUCPOC 262* 290* 337* COVID-19 Meds: 
 
COVID-19 Meds: 
Dexamethasone 6mg daily (12/24-1/2) Convalescent plasma transfusion (12/24) 
  Anti-infectives (start date): 
Vanc, Zosyn 1/5- Ventilator Settings:  5' 5\" (1.651 m) Mode FIO2 Rate Tidal Volume Pressure PEEP  
VC+  70 %(Increased from 65% post ABG)    350 ml  12 cm H2O(Increased post ABG)  14 cm H20(Found on 14) Peak airway pressure: 38 cm H2O Minute ventilation: 12.8 l/min ABG:  
Recent Labs 01/09/21 
0901 01/09/21 
9208 01/08/21 
1429 PHI 7.20* 7.19* 7.19* PCO2I 48.2* 51.8* 56.1*  
PO2I 63* 49* 93 HCO3I 18.8* 20.0* 21.2* Impression: 78 y.o. y/o female with acute hypoxemic respiratory failure secondary to COVID-19. NEURO:  
1. Sedation: Versed gtt 2. Analgesia: Fentanyl gtt 3. Paralytics: consider for severe hypoxemia or high ventilator pressures, poor synchrony with mechanical ventilation Not on any right now CV: 1. Volume Status: avoid fluids when possible. Will give lasix/albumin today. 2. Hypotension: off levo PULM:  
1. Acute hypoxemic/hypercapneic respiratory failure:  Titrate Min Vent to pH/CO2. Day # 8 on ventilator 2. Severe ARDS 2/2 COVID: Low Tidal Volume ventilation, goal 6cc per kg (Ideal body weight: 57 kg (125 lb 10.6 oz) x 6 = 348ml). Attempt to limit driving pressure to 19OD/R5C or less. Allow permissive hypercapnia/acidosis to pH 7.25 if needed to achieve above. Consider proning for 12 hours daily  severe ARDS with P:F <150. Consider inhaled Flolan if P:F<100. Paralytics if needed for dyssynchrony. RENAL: 
1. PRATIMA: monitor. Nephro following, Cr worse ,will likely eventually need HD 
GI: NPO, PPI prophy 1. Nutrition: continue TF 
HEME: no acute issues 1. Thrombocytopenia: stabilized. HIT negative 2. Anticoagulation: d-dimer to determine anticoagulation ppx for VTE. ID: WBC elevated, afebrile. 1. COVID-19: COVID meds as above. Restart Dexa course 1/8-> 
2. MRSA Pneumonia: stop Zosyn, will continue Vanc. Monitor cultures.   
ENDO: 
 1. DM: on lantus 20U daily with hyperglycemia. stopping D5 now so will monitor. Skin: no decub, turns, preventive care Prophy: Heparin/H2B Full Code The patient is critically ill with respiratory failure, circulatory failure and requires high complexity decision making for assessment and support including frequent ventilator adjustment , frequent evaluation and titration of therapies , application of advanced monitoring technologies and extensive interpretation of multiple databases.   Cumulative time devoted to patient care services by me for day of service -28 min  
 
Rikki Barry MD

## 2021-01-09 NOTE — PROGRESS NOTES
Ventilator check complete; patient has a #7.5 ET tube secured at the 22 at the lip. Patient is sedated. Patient is not able to follow commands. Breath sounds are coarse and diminished. Trachea is midline, Negative for subcutaneous air, and chest excursion is symmetric. Patient is also Negative for cyanosis and is Positive for pitting edema. All alarms are set and audible. Resuscitation bag is at the head of the bed. Ventilator Settings Mode FIO2 Rate Tidal Volume Pressure PEEP I:E Ratio VC+  60 %(Found and weaned from 70%)   34 350 ml   14 cm H20(Found on 14)  1:1.20 Peak airway pressure: 38 cm H2O Minute ventilation: 12.8 l/min ABG: No results for input(s): PH, PCO2, PO2, HCO3 in the last 72 hours. Jeannie Layton

## 2021-01-09 NOTE — PROGRESS NOTES
[facility] PROGRESS NOTE Follow up for: PRATIMA Subjective:  
Patient seen and examined Labs and chart reviewed,  
 
ROS: 
Unable to obtain Objective:  
Exam: 
Vitals:  
 01/09/21 0930 01/09/21 0945 01/09/21 1000 01/09/21 1015 BP:  (!) 94/43 (!) 105/48 (!) 102/48 Pulse: 65 64 62 67 Resp: (!) 34 (!) 33 (!) 34 (!) 34 Temp:      
SpO2: 96% 96% 95% 97% Weight:      
Height:      
 
 
 
Intake/Output Summary (Last 24 hours) at 1/9/2021 1133 Last data filed at 1/9/2021 5999 Gross per 24 hour Intake 3651.88 ml Output 1605 ml Net 2046.88 ml Current Facility-Administered Medications Medication Dose Route Frequency  sodium bicarbonate (8.4%) 150 mEq in sterile water 1,000 mL infusion   IntraVENous CONTINUOUS  
 polyethylene glycol (MIRALAX) packet 17 g  17 g Per NG tube EVERY OTHER DAY  dexamethasone (DECADRON) 10 mg/mL injection 6 mg  6 mg IntraVENous Q24H  
 0.9% sodium chloride infusion  75 mL/hr IntraVENous CONTINUOUS  
 alcohol 62% (NOZIN) nasal  1 Ampule  1 Ampule Topical Q12H  
 [Held by provider] furosemide (LASIX) injection 20 mg  20 mg IntraVENous Q12H  
 NOREPINephrine (LEVOPHED) 4 mg in 5% dextrose 250 mL infusion  0.5-30 mcg/min IntraVENous TITRATE  
 0.9% sodium chloride infusion 250 mL  250 mL IntraVENous PRN  
 0.9% sodium chloride infusion 250 mL  250 mL IntraVENous PRN  
 heparin (porcine) injection 5,000 Units  5,000 Units SubCUTAneous Q8H  
 midazolam in normal saline (VERSED) 1 mg/mL infusion  0-10 mg/hr IntraVENous TITRATE  fentaNYL in normal saline (pf) 25 mcg/mL infusion  0-200 mcg/hr IntraVENous TITRATE  insulin regular (NOVOLIN R, HUMULIN R) injection   SubCUTAneous Q6H  
 sodium chloride 3% hypertonic nebulizer soln  4 mL Nebulization BID  Vancomycin Pharmacy Intermittent Dosing   Other Rx Dosing/Monitoring  dexmedeTOMidine (PRECEDEX) 400 mcg in 0.9% sodium chloride 100 mL infusion  0.1-1.5 mcg/kg/hr IntraVENous TITRATE  fentaNYL citrate (PF) injection 50 mcg  50 mcg IntraVENous Q2H PRN  
 bisacodyL (DULCOLAX) suppository 10 mg  10 mg Rectal DAILY PRN  
 insulin glargine (LANTUS) injection 20 Units  20 Units SubCUTAneous DAILY  [Held by provider] sodium zirconium cyclosilicate (LOKELMA) powder packet 5 g  5 g Oral DAILY  senna-docusate (PERICOLACE) 8.6-50 mg per tablet 2 Tab  2 Tab Per NG tube DAILY  NUTRITIONAL SUPPORT ELECTROLYTE PRN ORDERS   Does Not Apply PRN  
 [Held by provider] hydrALAZINE (APRESOLINE) tablet 10 mg  10 mg Per NG tube TID  famotidine (PF) (PEPCID) 20 mg in 0.9% sodium chloride 10 mL injection  20 mg IntraVENous DAILY  dextrose 40% (GLUTOSE) oral gel 1 Tube  15 g Oral PRN  
 glucagon (GLUCAGEN) injection 1 mg  1 mg IntraMUSCular PRN  
 dextrose (D50W) injection syrg 12.5-25 g  25-50 mL IntraVENous PRN  
 albuterol (PROVENTIL HFA, VENTOLIN HFA, PROAIR HFA) inhaler 2 Puff  2 Puff Inhalation Q4H PRN  
 hydrALAZINE (APRESOLINE) 20 mg/mL injection 20 mg  20 mg IntraVENous Q2H PRN  phenol throat spray (CHLORASEPTIC) 1 Spray  1 Spray Oral PRN  
 lip protectant (BLISTEX) ointment 1 Each  1 Each Topical PRN  
 acetaminophen (TYLENOL) tablet 650 mg  650 mg Oral Q6H PRN  
 0.9% sodium chloride infusion 250 mL  250 mL IntraVENous PRN  
 sodium chloride (NS) flush 5-40 mL  5-40 mL IntraVENous Q8H  
 sodium chloride (NS) flush 5-40 mL  5-40 mL IntraVENous PRN  
 albuterol (PROVENTIL VENTOLIN) nebulizer solution 2.5 mg  2.5 mg Nebulization Q6H RT  
 
 
Physical EXAM MD did not go into room GEN - intubated, sedated on vent CV - Regular rate, S1, S2, no Rub Lung - coarse bilaterally Abd -  Soft, non tender, + BS Ext - no edema Recent Labs 01/09/21 
3806 01/08/21 
0345 01/07/21 
8546 WBC 10.2 10.8 10.5 HGB 8.5* 8.6* 8.6* HCT 27.2* 26.6* 26.5*  
  154 154 Recent Labs 01/09/21 
6992 01/08/21 
0345 01/07/21 
8800  137 141  
K 5.0 4.5 4.0  
* 109* 110* CO2 21 24 23 * 94* 86* CREA 3.59* 3.31* 2.99* CA 8.7 8.6 8.3 * 252* 201* MG  --  2.6*  --   
PHOS  --  4.9*  --   
 
 
Assessment and Plan: 1. PRATIMA over CKD Possibly COVID ATN , non oliguric Baseline 1 to 1.5 , admitted with Cr of 2.8 on 12/24  
-Echo from 2019 - Normal EF and systolic function  
likely pre renal component and vanc level up 23.3 Creatinine stable, non oliguric. 2. Hyperkalemia- resolved 3. Hypernatremia - improved  
  
4. COVID 19 +/acute respiratory failure/ARDs ventilated per pulm 5.  Anemia s/p 2 units PRBC 1/6 
 
 
   
Canelo Spicer MD

## 2021-01-10 NOTE — PROGRESS NOTES
Ventilator check complete; patient has a #7.5 ET tube secured at the 23 at the lip. Patient is sedated. Patient is not able to follow commands. Breath sounds are coarse. Trachea is midline, Negative for subcutaneous air, and chest excursion is symmetric. Patient is also Negative for cyanosis and is Negative for pitting edema. All alarms are set and audible. Resuscitation bag is at the head of the bed. Ventilator Settings Mode FIO2 Rate Tidal Volume Pressure PEEP I:E Ratio  
VC+, Assist control  70 %   34 350 ml   14 cm H20  1:1.2 Peak airway pressure: 33 cm H2O Minute ventilation: 13 l/min Citlali William, RT

## 2021-01-10 NOTE — PROGRESS NOTES
Antonio Martin 149 COVID-19 Note: 
 
Critical Care Note: 1/10/2021 Laura Shipley Admission Date: 12/24/2020     Length of Stay: 17 days Background: 78 y.o. y/o female with acute hypoxemic respiratory failure secondary to COVID-19. Onset of COVID-19 symptoms: 12/24 Notable PMH: DM, CAD, HTN, CKD 
 
24 Hour events:  On vent,on 70 % Fi02 ,sedated Renal function worsening, anuric now ROS: intubated, sedated Lines: (insertion date) Quad Lumen 12/30/20 Anterior; Left Internal jugular Orogastric Tube 12/30/20 Urinary Catheter 01/05/21 [place] Airway - Endotracheal Tube 12/30/20 Oral 
Wound Wrist Left;Dorsal 12/31/20 Wound Ankle Anterior Drips: current dose (range) Versed   3 mg/hr (0-10) Fentanyl  100 mcg/hr (0-300) 4) Visit Vitals BP (!) 143/59 Pulse (!) 59 Temp 98.3 °F (36.8 °C) Resp (!) 33 Ht 5' 5\" (1.651 m) Wt 180 lb 1.9 oz (81.7 kg) SpO2 95% BMI 29.97 kg/m² Pertinent Exam:           
Constitutional:  intubated and mechanically ventilated. EENMT:  Sclera clear, pupils equal, oral mucosa moist 
Respiratory: coarse Cardiovascular:  RRR, Gastrointestinal:  soft with no tenderness; positive bowel sounds present Musculoskeletal:  warm with no cyanosis, plus 2 lower extremity edema Skin:  no jaundice or ecchymosis Neurologic:sedated Psychiatric: sedated and paralyzed CXR none Pertinent Labs:  
Recent Labs  
  01/10/21 
0336 01/09/21 
0344 01/08/21 
0345 WBC 7.5 10.2 10.8 HGB 7.3* 8.5* 8.6* HCT 23.3* 27.2* 26.6*  
 160 154 Recent Labs  
  01/10/21 
0336 01/09/21 
0344 01/08/21 
0345  139 137  
K 5.2* 5.0 4.5  
 108* 109* CO2 23 21 24 * 358* 252* * 103* 94* CREA 3.98* 3.59* 3.31* MG  --   --  2.6*  
CA 8.5 8.7 8.6 PHOS  --   --  4.9* Recent Labs  
  01/10/21 
1105 01/10/21 
0816 01/10/21 
5281 GLUCPOC 238* 278* 298* COVID-19 Meds: 
 
COVID-19 Meds: Dexamethasone 6mg daily (12/24-1/2) Convalescent plasma transfusion (12/24) 
  Anti-infectives (start date): 
Vanc, Zosyn 1/5- Ventilator Settings:  5' 5\" (1.651 m) Mode FIO2 Rate Tidal Volume Pressure PEEP  
VC+  65 %    350 ml  12 cm H2O(Increased post ABG)  14 cm H20 Peak airway pressure: 39 cm H2O Minute ventilation: 12.6 l/min ABG:  
Recent Labs  
  01/10/21 
0605 01/09/21 
0901 01/09/21 
0021 PHI 7.28* 7.20* 7.19* PCO2I 44.8 48.2* 51.8*  
PO2I 80 63* 49* HCO3I 21.1* 18.8* 20.0* Impression: 78 y.o. y/o female with acute hypoxemic respiratory failure secondary to COVID-19. NEURO:  
1. Sedation: Versed gtt 2. Analgesia: Fentanyl gtt 3. Paralytics: Not on any right now CV: 1. Volume Status: avoid fluids when possible.,anuric, 13L positive , will need soon HD 2. Hypotension: off levo PULM:  
1. Acute hypoxemic/hypercapneic respiratory failure:  Titrate Min Vent to pH/CO2. Day # 10 on ventilator 2. Severe ARDS 2/2 COVID: Low Tidal Volume ventilation, goal 6cc per kg (Ideal body weight: 57 kg (125 lb 10.6 oz) x 6 = 348ml). Attempt to limit driving pressure to 47TZ/Y7J or less. Allow permissive hypercapnia/acidosis to pH 7.25 if needed to achieve above. Consider proning for 12 hours daily  severe ARDS with P:F <150. Consider inhaled Flolan if P:F<100. Paralytics if needed for dyssynchrony. RENAL: 
1. PRATIMA: monitor. Nephro following, Cr worse ,will likely eventually need HD ,likely soon GI: NPO, PPI prophy 1. Nutrition: continue TF 
HEME: no acute issues 1. Thrombocytopenia: stabilized. HIT negative 2. Anticoagulation: d-dimer to determine anticoagulation ppx for VTE. ID: WBC elevated, afebrile. 1. COVID-19: COVID meds as above. Restart Dexa course 1/8-> 
2. MRSA Pneumonia: on Vanco /Zosyn D  5 . Monitor cultures. ENDO: 
1. DM: on lantus 20U daily with hyperglycemia. stopping D5 now so will monitor. Skin: no decub, turns, preventive care Prophy: Heparin/H2B Prognosis poor and may benefit from [facility] consult tomorrow to discuss goals with the family ,? Candance Blanch ,HD Full Code The patient is critically ill with respiratory failure, circulatory failure and requires high complexity decision making for assessment and support including frequent ventilator adjustment , frequent evaluation and titration of therapies , application of advanced monitoring technologies and extensive interpretation of multiple databases.   Cumulative time devoted to patient care services by me for day of service -28 min  
 
Nemo Elias MD

## 2021-01-10 NOTE — PROGRESS NOTES
[facility] PROGRESS NOTE Follow up for: PRATIMA Subjective:  
Patient seen and examined Labs and chart reviewed,  
 
ROS: 
Unable to obtain Objective:  
Exam: 
Vitals:  
 01/10/21 1000 01/10/21 1030 01/10/21 1100 01/10/21 1130 BP: (!) 144/57 (!) 142/56 (!) 141/56 (!) 143/59 Pulse: 63 63 60 (!) 59 Resp: (!) 33 (!) 33 (!) 34 (!) 33 Temp:      
SpO2: 95% 94% 94% 95% Weight:      
Height:      
 
 
 
Intake/Output Summary (Last 24 hours) at 1/10/2021 1310 Last data filed at 1/10/2021 1127 Gross per 24 hour Intake 2668.4 ml Output 303 ml Net 2365.4 ml  
 
 
Current Facility-Administered Medications Medication Dose Route Frequency  insulin regular (NOVOLIN R, HUMULIN R) injection   SubCUTAneous Q4H  
 furosemide (LASIX) 100 mg in 0.9% sodium chloride 100 mL infusion  10 mg/hr IntraVENous CONTINUOUS  
 polyethylene glycol (MIRALAX) packet 17 g  17 g Per NG tube EVERY OTHER DAY  dexamethasone (DECADRON) 10 mg/mL injection 6 mg  6 mg IntraVENous Q24H  
 alcohol 62% (NOZIN) nasal  1 Ampule  1 Ampule Topical Q12H  
 [Held by provider] furosemide (LASIX) injection 20 mg  20 mg IntraVENous Q12H  
 NOREPINephrine (LEVOPHED) 4 mg in 5% dextrose 250 mL infusion  0.5-30 mcg/min IntraVENous TITRATE  
 0.9% sodium chloride infusion 250 mL  250 mL IntraVENous PRN  
 0.9% sodium chloride infusion 250 mL  250 mL IntraVENous PRN  
 heparin (porcine) injection 5,000 Units  5,000 Units SubCUTAneous Q8H  
 midazolam in normal saline (VERSED) 1 mg/mL infusion  0-10 mg/hr IntraVENous TITRATE  fentaNYL in normal saline (pf) 25 mcg/mL infusion  0-200 mcg/hr IntraVENous TITRATE  sodium chloride 3% hypertonic nebulizer soln  4 mL Nebulization BID  Vancomycin Pharmacy Intermittent Dosing   Other Rx Dosing/Monitoring  dexmedeTOMidine (PRECEDEX) 400 mcg in 0.9% sodium chloride 100 mL infusion  0.1-1.5 mcg/kg/hr IntraVENous TITRATE  fentaNYL citrate (PF) injection 50 mcg  50 mcg IntraVENous Q2H PRN  
 bisacodyL (DULCOLAX) suppository 10 mg  10 mg Rectal DAILY PRN  
 insulin glargine (LANTUS) injection 20 Units  20 Units SubCUTAneous DAILY  [Held by provider] sodium zirconium cyclosilicate (LOKELMA) powder packet 5 g  5 g Oral DAILY  senna-docusate (PERICOLACE) 8.6-50 mg per tablet 2 Tab  2 Tab Per NG tube DAILY  NUTRITIONAL SUPPORT ELECTROLYTE PRN ORDERS   Does Not Apply PRN  
 [Held by provider] hydrALAZINE (APRESOLINE) tablet 10 mg  10 mg Per NG tube TID  famotidine (PF) (PEPCID) 20 mg in 0.9% sodium chloride 10 mL injection  20 mg IntraVENous DAILY  dextrose 40% (GLUTOSE) oral gel 1 Tube  15 g Oral PRN  
 glucagon (GLUCAGEN) injection 1 mg  1 mg IntraMUSCular PRN  
 dextrose (D50W) injection syrg 12.5-25 g  25-50 mL IntraVENous PRN  
 albuterol (PROVENTIL HFA, VENTOLIN HFA, PROAIR HFA) inhaler 2 Puff  2 Puff Inhalation Q4H PRN  
 hydrALAZINE (APRESOLINE) 20 mg/mL injection 20 mg  20 mg IntraVENous Q2H PRN  phenol throat spray (CHLORASEPTIC) 1 Spray  1 Spray Oral PRN  
 lip protectant (BLISTEX) ointment 1 Each  1 Each Topical PRN  
 acetaminophen (TYLENOL) tablet 650 mg  650 mg Oral Q6H PRN  
 0.9% sodium chloride infusion 250 mL  250 mL IntraVENous PRN  
 sodium chloride (NS) flush 5-40 mL  5-40 mL IntraVENous Q8H  
 sodium chloride (NS) flush 5-40 mL  5-40 mL IntraVENous PRN  
 albuterol (PROVENTIL VENTOLIN) nebulizer solution 2.5 mg  2.5 mg Nebulization Q6H RT  
 
 
Physical EXAM MD did not go into room GEN - intubated, sedated on vent CV - Regular rate, S1, S2, no Rub Lung - coarse bilaterally Abd -  Soft, non tender, + BS Ext - no edema Recent Labs  
  01/10/21 
0336 01/09/21 
0344 01/08/21 
0345 WBC 7.5 10.2 10.8 HGB 7.3* 8.5* 8.6* HCT 23.3* 27.2* 26.6*  
 160 154 Recent Labs  
  01/10/21 
0336 01/09/21 
0344 01/08/21 
0345  139 137  
K 5.2* 5.0 4.5  108* 109*  
CO2 23 21 24  
* 103* 94*  
CREA 3.98* 3.59* 3.31*  
CA 8.5 8.7 8.6  
* 358* 252*  
MG  --   --  2.6*  
PHOS  --   --  4.9*  
 
 
Assessment and Plan:  
 
1. PRATIMA over CKD  
Possibly COVID ATN ,  
Baseline 1 to 1.5 , admitted with Cr of 2.8 on 12/24  
-Echo from 2019 - Normal EF and systolic function  
Worsening azotemia and decreased urine out put 
I will start a lasix drip and see how she responds. Consider short term dialysis in the near future if she does not respond to diuretics. 
 
2. Hyperkalemia- resolved 
 
3. Hypernatremia - improved  
  
4. COVID 19 +/acute respiratory failure/ARDs ventilated per pulm  
 
5. Anemia s/p 2 units PRBC 1/6 
 
 
   
Nuvia Thurman MD

## 2021-01-10 NOTE — PROGRESS NOTES
Ventilator check complete; patient has a #7.5 ET tube secured at the 23 at the lip. Patient is sedated. Patient is not able to follow commands. Breath sounds are coarse and diminished. Trachea is midline, Negative for subcutaneous air, and chest excursion is symmetric. Patient is also Negative for cyanosis and is Positive for pitting edema. All alarms are set and audible. Resuscitation bag is at the head of the bed. Ventilator Settings Mode FIO2 Rate Tidal Volume Pressure PEEP I:E Ratio VC+  65 %    350 ml  12 cm H2O(Increased post ABG)  14 cm H20  1:1.20 Peak airway pressure: 39 cm H2O Minute ventilation: 12.6 l/min Curry Bumpers, RT

## 2021-01-11 NOTE — CONSULTS
Palliative Care Patient: Francine Avilez MRN: [id number]  SSN: [id number] YOB: 1940  Age: 78 y.o. Sex: female Date of Request: 1/11/2021 Date of Consult:  1/11/2021 Reason for Consult:  family support and education Requesting Physician: Dr. Jeanie Long Assessment/Plan:  
 
Principal Diagnosis: Dyspnea  R06.00 Additional Diagnoses: · Acute Respiratory Failure, Unspecified  J96.00 · Altered Mental Status R41.82 
· Frailty  R54 · Counseling, Encounter for Medical Advice  Z71.9 
· Encounter for Palliative Care  Z51.5 Palliative Performance Scale (PPS): 
  
 
Medical Decision Making:  
Reviewed and summarized labs and imaging. Pt sedated on vent. Spoke with pt daughter via phone and updated on current condition and concerns. Reviewed plans for dialysis and ongoing difficulties with ventilation. Daughter expressed her understanding regarding pt decline and overall severity of condition. We discussed code status and daughter agrees with DNR. Order placed. Daughter wants some time to process potential need for trach. Dialysis to be started. Will follow and update family continue goals discussion. Will discuss findings with members of the interdisciplinary team.   
 
Thank you for this referral.    
 
 
Subjective:  
 
History obtained from:  Family and Chart Chief Complaint: dyspnea History of Present Illness:   78 y.o.  female presents with hypoxemia. 
  
Patient has DM/CAD/HTN/CKD and reported came in with dyspnea. Saturation were 44% per EMS and in ER was put on BIPAP since did not improve with NC. On 100% BIPAP and saturation only 93%. She reported did see a relative who had covid recently. Here the rapid test was positive. ABG noted pH 7.31/41/70 on BIPAP 100%. CXR with diffuse infiltrates. Also noted worsening kidney function. Pt intubated and now requiring HD. Advance Directive: No      
Code Status:  DNR           
 Health Care Power of : pt daughter is decision maker. Past Medical History:  
Diagnosis Date  Acute cystitis without hematuria 1/30/2019  CAD (coronary artery disease)  DM2 (diabetes mellitus, type 2) ([address]  Gout  HLD (hyperlipidemia)  HTN (hypertension)  Peripheral neuropathy Past Surgical History:  
Procedure Laterality Date  HX CHOLECYSTECTOMY jennifer in 1964  HX CORONARY ARTERY BYPASS GRAFT    
 x3  
 HX TUBAL LIGATION    
 GA CARDIAC SURG PROCEDURE UNLIST    
 stents x 3 2009 Family History Problem Relation Age of Onset  Hypertension Mother  Cancer Mother  Diabetes Mother  Heart Disease Mother Social History Tobacco Use  Smoking status: Never Smoker  Smokeless tobacco: Never Used Substance Use Topics  Alcohol use: Yes Comment: socially Prior to Admission medications Medication Sig Start Date End Date Taking? Authorizing Provider  
atorvastatin (LIPITOR) 40 mg tablet Take 40 mg by mouth. 6/2/20   Provider, Historical  
aspirin delayed-release 81 mg tablet Take  by mouth daily. Provider, Historical  
metoprolol tartrate (LOPRESSOR) 25 mg tablet Take 0.5 Tabs by mouth every twelve (12) hours. 3/16/20   John Pitt MD  
insulin lispro (HUMALOG) 100 unit/mL injection 15 Units by SubCUTAneous route Before breakfast, lunch, and dinner. 9/20/19   Tessie Chavez NP Insulin Needles, Disposable, 31 gauge x 5/16\" ndle by SubCUTAneous route Before breakfast, lunch, dinner and at bedtime. 9/20/19   Miguel CHARLES NP  
clopidogrel (PLAVIX) 75 mg tab Take 75 mg by mouth. Provider, Historical  
insulin glargine (LANTUS) 100 unit/mL injection 50 Units by SubCUTAneous route nightly.  2/6/19   Aj Dewitt MD  
Blood-Glucose Meter [facility] ULTRAMINI) monitoring kit Use as directed 4/14/15   Sangita Ovalle MD  
 glucose blood VI test strips (ONETOUCH ULTRA TEST) strip Test 4 times daily as directed 4/14/15   Scott Otero MD  
Lancets [facility] ULTRASOFT LANCETS) misc Test 4 times daily as directed 4/14/15   Scott Otero MD  
losartan (COZAAR) 100 mg tablet Take  by mouth daily. 1/12/15   Provider, Historical  
rosuvastatin (CRESTOR) 20 mg tablet Take 1 Tab by mouth nightly. 3/9/15   Scott Otero MD  
gabapentin (NEURONTIN) 100 mg capsule Take 1 Cap by mouth three (3) times daily. Patient taking differently: Take 100 mg by mouth two (2) times a day. 3/9/15   Scott Otero MD  
rOPINIRole (REQUIP) 0.5 mg tablet Take 1 Tab by mouth nightly as needed. 4/21/14   Scott Otero MD  
 
 
Allergies Allergen Reactions  Sulfa (Sulfonamide Antibiotics) Swelling Review of systems unable to obtain due to mentation. Objective:  
 
Visit Vitals BP (!) 105/55 Pulse (!) 55 Temp 98.4 °F (36.9 °C) Resp (!) 33 Ht 5' 5\" (1.651 m) Wt 182 lb 1.6 oz (82.6 kg) SpO2 95% BMI 30.30 kg/m² Physical Exam: 
 
General:  Sedated,No acute distress. Eyes:  Conjunctivae/corneas clear Nose: Nares normal. Septum midline. Neck: Supple, symmetrical, trachea midline, no JVD Lungs:   Coarse bilateral bs Heart:  Regular rate and rhythm, no murmur Abdomen:   Soft, non-tender, non-distended. Positive bowel sounds Extremities: Normal, atraumatic, no cyanosis or edema Skin: Skin color, texture, turgor normal. No rash or lesions. Neurologic: sedated Psych: sedated Assessment:  
 
Hospital Problems  Date Reviewed: 8/10/2019 Codes Class Noted POA Cardiac arrest [facility]) ICD-10-CM: I46.9 ICD-9-CM: 427.5  12/30/2020 Unknown Acute cystitis without hematuria ICD-10-CM: N30.00 ICD-9-CM: 595.0  12/29/2020 Unknown COVID-19 ICD-10-CM: U07.1 ICD-9-CM: 079.89  12/24/2020 Unknown * (Principal) Acute respiratory distress syndrome (ARDS) due to severe acute respiratory syndrome coronavirus 2 (SARS-CoV-2) ([facility]) ICD-10-CM: U07.1, J80 
ICD-9-CM: 518.82, 079.89  12/24/2020 Unknown Acute hypoxemic respiratory failure [address] [address] ICD-10-CM: J96.01 
ICD-9-CM: 518.81  12/24/2020 Unknown Acute on chronic renal failure (HCC) ICD-10-CM: N17.9, N18.9 ICD-9-CM: 584.9, 585.9  9/17/2019 Unknown Coronary artery disease involving coronary bypass graft of native heart without angina pectoris (Chronic) ICD-10-CM: J60.927 ICD-9-CM: 414.05  6/15/2015 Yes Uncontrolled type 2 diabetes mellitus with hyperglycemia (HCC) (Chronic) ICD-10-CM: E11.65 ICD-9-CM: 250.02  6/15/2015 Yes Diabetes mellitus with hyperosmolarity without hyperglycemic hyperosmolar nonketotic coma (HCC) (Chronic) ICD-10-CM: E11.00 ICD-9-CM: 250.20  11/24/2014 Yes Signed By: Blanca Guillaume MD   
 January 11, 2021

## 2021-01-11 NOTE — PROGRESS NOTES
Comprehensive Nutrition Assessment Type and Reason for Visit: Mateus Fried This assessment was completed remotely from within the hospital. 
 
Nutrition Recommendations/Plan:  
· Enteral Nutrition: · Change formula to Nepro via OGT at goal rate of 35ml/hr. · Change water flush 95ml Q4H. · At goal will provide 1512kcal (100% estimated calorie needs), 68gm protein (100% estimated protein needs), and 1181ml free fluid (<1ml/kcal; anuric). · Vitamin and Mineral Supplement Therapy: · Electrolyte management replacement protocol active. · Labs: · BMP daily, Mg and Phos MWF. · Bowel Regimen: · D/C Miralax daily and Pericolace (2 tabs) daily · Add liquid colace daily Malnutrition Assessment: 
Malnutrition Status: Insufficient data Nutrition Assessment:  
Nutrition History: 12/31: 3 recorded meals in past 6 days with average intake of 83% Nutrition Background: H/O: CAD, HTN, DM, HLD, peripheral neuropathy, CKD stage III. Presented with dyspnea. Findings of +COVID. Admitted to ICU with ARDS, severe acute hypoxic due to COVID PNA, PRATIMA on CKD. Was requiring BIPAP at night and Aviro during day. Had brief cardiac arrest and was intubated 12/30 Daily Update: 
Proned on 1/8. Noted to be anuric 1/10. Per nephrology note, consult for IR to start dialysis. If continuing with aggressive care, trach this week. Per [facility] consult, family has changed code status to DNR. FMS placed 1/10. Abdominal Status (last documented): Diarrhea abdomen with Active  bowel sounds. Last BM 01/11/21. Pertinent Medications: Dulcolax (PRN), Decadron, Pepcid, Lasix, Lantus (10units), SSI started 1/10 (33 units, 24 units thus far today), Miralax (every other day, held), Pericolace (2 tabs, held) Drips: Fentanyl, Versed, Levophed (stopped 1/9) Pertinent Labs: Sodium 140, Potassium 5.4, Glucose 214, , Cr 4.38, Phos 7.1, Magnesium 2.7 Edema: BUE 2+, BLE +1 
 
Nutrition Related Findings: NFPE deferred d/t isolation. Intubated and OGT placed 12/30. Current Nutrition Therapies: DIET NPO 
DIET TUBE FEEDING Please open for rate and water flush Current Tube Feeding (TF) Orders: · Feeding Route: Orogastric · Formula: Vital AF 1.2 · Schedule:Continuous · Regimen: 45ml/hr · Additives/Modulars: (None) · Water Flushes: 80ml Q4H 
· Current TF & Flush Orders Provides: at goal 
· Goal TF & Flush Orders Provides: 1440 kcal (100% estimated calorie needs), 90 grams protein (100% estimated protein needs) and 1453ml free fluid (1ml/kcal). Current Intake: Average Meal Intake: NPO Average Supplement Intake: NPO Anthropometric Measures: 
Height: 5' 5\" (165.1 cm) Current Body Wt: 82.6 kg (182 lb 1.6 oz)(1/11), Weight source: Bed scale BMI: 30.3, Obese class 1 (BMI 30.0-34. 9) Admission Body Weight: 159 lb 2.8 oz(Bedscale) Ideal Body Wt: 125 lbs (57 kg), 123.8 % Usual Body Wt: 73.5 kg (162 lb)(Per EMR 11/5/82020), Percent weight change: -4.5 Estimated Daily Nutrient Needs: 
Energy (kcal/day): 0262-2830 kcal/d (Kcal/kg(25-30), Weight Used: Ideal(57 kg)) Protein (g/day): 57-74 grams/d (1-1.3 grams/kg) Weight Used: (Ideal) Fluid (ml/day):   (1 ml/kcal) Nutrition Diagnosis:  
· Inadequate oral intake related to impaired respiratory function as evidenced by (intubated, NPO, TF to meet needs) Nutrition Interventions:  
Food and/or Nutrient Delivery: Continue NPO, Modify tube feeding Coordination of Nutrition Care: Continue to monitor while inpatient Plan of Care discussed with Gagandeep Chavez Goals: Previous Goal Met: Progressing toward goal(s) Active Goal: Tolerate and maintain estimated nutrition needs at goal rate Nutrition Monitoring and Evaluation:  
  
Food/Nutrient Intake Outcomes: Enteral nutrition intake/tolerance Physical Signs/Symptoms Outcomes: Biochemical data, GI status Discharge Planning: Too soon to determine Electronically signed by Carrington Carr MS, RDN, LD 1/11/2021 at 1:14 PM 
Contact: [phone] Disaster Mode active

## 2021-01-11 NOTE — PROGRESS NOTES
Ventilator check complete; patient has a #7.5 ET tube secured at the 23 at the lip. Patient is sedated. Patient is not able to follow commands. Breath sounds are coarse and diminished. Trachea is midline, Negative for subcutaneous air, and chest excursion is symmetric. Patient is also Negative for cyanosis. All alarms are set and audible. Resuscitation bag is at the head of the bed. Ventilator Settings Mode FIO2 Rate Tidal Volume Pressure PEEP I:E Ratio Assist control, VC+  55 %   34 350 ml  12 cm H2O(Increased post ABG)  14 cm H20  1;1.2 Peak airway pressure: 40 cm H2O Minute ventilation: 12.8 l/min ABG: No results for input(s): PH, PCO2, PO2, HCO3 in the last 72 hours. Alise Bentley

## 2021-01-11 NOTE — PROGRESS NOTES
[facility] PROGRESS NOTE Follow up for: PRATIMA Subjective:  
 
 
Labs and chart reviewed,  
 
ROS: 
Unable to obtain Objective:  
Exam: 
Vitals:  
 01/11/21 0700 01/11/21 0715 01/11/21 0730 01/11/21 0827 BP: (!) 104/49  (!) 109/50 Pulse: 64 68 67 67 Resp: 27 (!) 33 (!) 32 (!) 34 Temp: 98.4 °F (36.9 °C) SpO2: 92% 92% 93% 95% Weight:      
Height:      
 
PE: On Vent. Lung: few bronchi CV; RR, no rub Abd: soft, no rebound Ext: + edema Intake/Output Summary (Last 24 hours) at 1/11/2021 1112 Last data filed at 1/11/2021 2424 Gross per 24 hour Intake 1425.21 ml Output 426 ml Net 999.21 ml  
 
 
Current Facility-Administered Medications Medication Dose Route Frequency  insulin regular (NOVOLIN R, HUMULIN R) injection   SubCUTAneous Q4H  
 furosemide (LASIX) 100 mg in 0.9% sodium chloride (MBP/ADV) 110 mL infusion  10 mg/hr IntraVENous CONTINUOUS  
 polyethylene glycol (MIRALAX) packet 17 g  17 g Per NG tube EVERY OTHER DAY  dexamethasone (DECADRON) 10 mg/mL injection 6 mg  6 mg IntraVENous Q24H  
 alcohol 62% (NOZIN) nasal  1 Ampule  1 Ampule Topical Q12H  
 [Held by provider] furosemide (LASIX) injection 20 mg  20 mg IntraVENous Q12H  
 NOREPINephrine (LEVOPHED) 4 mg in 5% dextrose 250 mL infusion  0.5-30 mcg/min IntraVENous TITRATE  
 0.9% sodium chloride infusion 250 mL  250 mL IntraVENous PRN  
 0.9% sodium chloride infusion 250 mL  250 mL IntraVENous PRN  
 heparin (porcine) injection 5,000 Units  5,000 Units SubCUTAneous Q8H  
 midazolam in normal saline (VERSED) 1 mg/mL infusion  0-10 mg/hr IntraVENous TITRATE  fentaNYL in normal saline (pf) 25 mcg/mL infusion  0-200 mcg/hr IntraVENous TITRATE  sodium chloride 3% hypertonic nebulizer soln  4 mL Nebulization BID  Vancomycin Pharmacy Intermittent Dosing   Other Rx Dosing/Monitoring  dexmedeTOMidine (PRECEDEX) 400 mcg in 0.9% sodium chloride 100 mL infusion  0.1-1.5 mcg/kg/hr IntraVENous TITRATE  fentaNYL citrate (PF) injection 50 mcg  50 mcg IntraVENous Q2H PRN  
 bisacodyL (DULCOLAX) suppository 10 mg  10 mg Rectal DAILY PRN  
 insulin glargine (LANTUS) injection 20 Units  20 Units SubCUTAneous DAILY  [Held by provider] sodium zirconium cyclosilicate (LOKELMA) powder packet 5 g  5 g Oral DAILY  senna-docusate (PERICOLACE) 8.6-50 mg per tablet 2 Tab  2 Tab Per NG tube DAILY  NUTRITIONAL SUPPORT ELECTROLYTE PRN ORDERS   Does Not Apply PRN  
 [Held by provider] hydrALAZINE (APRESOLINE) tablet 10 mg  10 mg Per NG tube TID  famotidine (PF) (PEPCID) 20 mg in 0.9% sodium chloride 10 mL injection  20 mg IntraVENous DAILY  dextrose 40% (GLUTOSE) oral gel 1 Tube  15 g Oral PRN  
 glucagon (GLUCAGEN) injection 1 mg  1 mg IntraMUSCular PRN  
 dextrose (D50W) injection syrg 12.5-25 g  25-50 mL IntraVENous PRN  
 albuterol (PROVENTIL HFA, VENTOLIN HFA, PROAIR HFA) inhaler 2 Puff  2 Puff Inhalation Q4H PRN  
 hydrALAZINE (APRESOLINE) 20 mg/mL injection 20 mg  20 mg IntraVENous Q2H PRN  phenol throat spray (CHLORASEPTIC) 1 Spray  1 Spray Oral PRN  
 lip protectant (BLISTEX) ointment 1 Each  1 Each Topical PRN  
 acetaminophen (TYLENOL) tablet 650 mg  650 mg Oral Q6H PRN  
 0.9% sodium chloride infusion 250 mL  250 mL IntraVENous PRN  
 sodium chloride (NS) flush 5-40 mL  5-40 mL IntraVENous Q8H  
 sodium chloride (NS) flush 5-40 mL  5-40 mL IntraVENous PRN  
 albuterol (PROVENTIL VENTOLIN) nebulizer solution 2.5 mg  2.5 mg Nebulization Q6H RT  
 
 
Physical EXAM MD did not go into room GEN - intubated, sedated on vent CV - Regular rate, S1, S2, no Rub Lung - coarse bilaterally Abd -  Soft, non tender, + BS Ext - no edema Recent Labs  
  01/11/21 
0403 01/10/21 
0336 01/09/21 
0344 WBC 9.9 7.5 10.2 HGB 7.6* 7.3* 8.5* HCT 24.3* 23.3* 27.2*  
 153 160 Recent Labs  
  01/11/21 
0403 01/10/21 
0336 01/09/21 
0344  140 139  
K 5.4* 5.2* 5.0  
 107 108* CO2 23 23 21 * 121* 103* CREA 4.38* 3.98* 3.59* CA 8.5 8.5 8.7 * 267* 358* MG 2.7*  --   --   
PHOS 7.1*  --   --   
 
 
Assessment and Plan: 1. PRATIMA over CKD Possibly COVID ATN ,  
Baseline 1 to 1.5 , admitted with Cr of 2.8 on 12/24  
-Echo from 2019 - Normal EF and systolic function Worsening azotemia and decreased urine out put Consult IR for line placement to initiate dialysis 2. Hyperkalemia- r 
  
 COVID 19 +/acute respiratory failure/ARDs ventilated per pulm Anemia s/p 2 units PRBC 1/6 
 
 
   
Orquidea Thibodeaux MD

## 2021-01-11 NOTE — PROGRESS NOTES
Chart reviewed by [facility] Consult  For Palliative Care pt is now a DNR Family agreed to dialysis Pt will more than likely  need LTACH Will continue to follow for discharge planning needs Please consult  if any new issues arise PT and OT  evals will be needed when medically stable

## 2021-01-11 NOTE — PROGRESS NOTES
[facility] COVID-19 Note: 
 
Critical Care Note: 1/11/2021 Marcia Arnold Admission Date: 12/24/2020     Length of Stay: 18 days Background: 78 y.o. y/o female with acute hypoxemic respiratory failure secondary to COVID-19. Onset of COVID-19 symptoms: 12/24 Notable PMH: DM, CAD, HTN, CKD 
 
24 Hour events:  On vent,on 50 % Fi02 ,sedated, still on PRVC with high peak pressures in low 40s, ,hypotheramic on warming blanket Renal function worsening, lasix gtt increased. ROS: intubated, sedated Lines: (insertion date) Quad Lumen 12/30/20 Anterior; Left Internal jugular Orogastric Tube 12/30/20 Urinary Catheter 01/05/21 [place] Airway - Endotracheal Tube 12/30/20 Oral 
Wound Wrist Left;Dorsal 12/31/20 Wound Ankle Anterior Drips: current dose (range) Versed   3 mg/hr (0-10) Fentanyl  100 mcg/hr (0-300) Visit Vitals BP (!) 109/50 Pulse 67 Temp 98.4 °F (36.9 °C) Resp (!) 34 Ht 5' 5\" (1.651 m) Wt 182 lb 1.6 oz (82.6 kg) SpO2 95% BMI 30.30 kg/m² Pertinent Exam:           
Constitutional:  intubated and mechanically ventilated. EENMT:  Sclera clear, pupils equal, oral mucosa moist 
Respiratory: coarse Cardiovascular:  RRR, Gastrointestinal:  soft with no tenderness; positive bowel sounds present Musculoskeletal:  warm with no cyanosis, plus 2 lower extremity edema Skin:  no jaundice or ecchymosis Neurologic:sedated Psychiatric: sedated and paralyzed CXR 1/11: slight improvement on left Pertinent Labs:  
Recent Labs  
  01/11/21 
0403 01/10/21 
0336 01/09/21 
0344 WBC 9.9 7.5 10.2 HGB 7.6* 7.3* 8.5* HCT 24.3* 23.3* 27.2*  
 153 160 Recent Labs  
  01/11/21 
0403 01/10/21 
0336 01/09/21 
0344  140 139  
K 5.4* 5.2* 5.0  
 107 108* CO2 23 23 21 * 267* 358* * 121* 103* CREA 4.38* 3.98* 3.59* MG 2.7*  --   --   
CA 8.5 8.5 8.7 PHOS 7.1*  --   --   
 
Recent Labs  
  01/11/21 448 8003 01/11/21 
0747 01/11/21 
0435 GLUCPOC 243* 227* 239* COVID-19 Meds: 
Dexamethasone 6mg daily (12/24-1/2) Convalescent plasma transfusion (12/24) 
  Anti-infectives (start date): 
Vanc, Zosyn 1/5- Ventilator Settings:  5' 5\" (1.651 m) Mode FIO2 Rate Tidal Volume Pressure PEEP Assist control, VC+  50 %(weaned to 50%)    400 ml  12 cm H2O(Increased post ABG)  14 cm H20 Peak airway pressure: 36 cm H2O Minute ventilation: 12.8 l/min ABG:  
Recent Labs  
  01/11/21 
0537 01/10/21 
0605 01/09/21 
0901 PHI 7.24* 7.28* 7.20* PCO2I 50.7* 44.8 48.2*  
PO2I 73* 80 63* HCO3I 21.6* 21.1* 18.8* Impression: 78 y.o. y/o female with acute hypoxemic respiratory failure secondary to COVID-19. NEURO:  
1. Sedation: Versed gtt 2. Analgesia: Fentanyl gtt CV: 1. Volume Status: not diuresing even with high dose Lasix, nephro to RRT 2. Hypotension: off pressors PULM:  
1. Acute hypoxemic/hypercapneic respiratory failure:  Titrate Min Vent to pH/CO2. Day # 13 on ventilator 2. Severe ARDS 2/2 COVID: Low Tidal Volume ventilation, goal 6cc per kg (Ideal body weight: 57 kg (125 lb 10.6 oz) x 6 = 348ml). Attempt to limit driving pressure to 78TS/N7X or less. Allow permissive hypercapnia/acidosis to pH 7.25 if needed to achieve above. Improved oxygenation, but still stiff with high peak pressures on vent RENAL: 
1. PRATIMA: monitor. Nephro following, Cr worse ,will likely eventually need HD ,likely soon GI: NPO, PPI prophy 1. Nutrition: continue TF 
HEME: no acute issues 1. Thrombocytopenia: stabilized. HIT negative 2. Anticoagulation: d-dimer to determine anticoagulation ppx for VTE. ID: WBC elevated, afebrile. 1. COVID-19: COVID meds as above. Restart Dexa course 1/8-> 
2. MRSA Pneumonia: on Vanco day 7. Repeat sputum culture x 1 ENDO: 
1. DM: increase lantus 30U daily with hyperglycemia. Skin: no decub, turns, preventive care Prophy: Heparin/H2B Full Code, spoke with daughter today. Discussed RRT, Trach, poor prognosis overall given hypothermia, poor ventilation, renal failure and will need trach if continues care. I will ask PC to see her and I discussed that they would call her and discuss options. If decides to proceed with ongoing aggressive care will schedule trach this week. The patient is critically ill with respiratory failure, circulatory failure and requires high complexity decision making for assessment and support including frequent ventilator adjustment , frequent evaluation and titration of therapies , application of advanced monitoring technologies and extensive interpretation of multiple databases. Cumulative time devoted to patient care services by me for day of service -35 min Vishal Ma MD

## 2021-01-11 NOTE — PROGRESS NOTES
's phone call to patient's daughter, also named Jayde Carmona. Daughter shared news provided by physician today. She was tearful over the phone. Daughter seems to understand that Ms. Tahir Jansen has a poor prognosis and dialysis may or may not improve patient's situation. I offered spiritual interventions including empathic listening, affirmation of sonia and emotions and prayer as requested. Chaplains remain available for follow-up. Imer Porars MDiv Board Certified [employer]

## 2021-01-11 NOTE — PROCEDURES
Department of Interventional Radiology 
[phone] Interventional Radiology Brief Procedure Note Patient: Omer Santana MRN: [id number]  SSN: [id number] YOB: 1940  Age: 78 y.o. Sex: female Date of Procedure: 1/11/2021 Pre-Procedure Diagnosis: COVID, respiratory failure, PRATIMA Post-Procedure Diagnosis: SAME Procedure(s): Temporary Central Venous Catheter Brief Description of Procedure: US guided right IJ temp HD catheter placed Performed By: Rubén Killian PA-C Assistants: None Anesthesia:Lidocaine Estimated Blood Loss: None Specimens:  None Implants:  Temporary Hemodialysis Catheter Findings: catheter tip in right atrium Complications: None Recommendations: ok to use catheter Follow Up: ICU team 
 
Signed By: Rubén Killian PA-C January 11, 2021

## 2021-01-11 NOTE — PROGRESS NOTES
I conferred with staff to explore patient/family needs. Ms. Cori Saleh daughter inquired about Spiritual Care in patient's care. A phone call is planned today, to speak with daughter. Betsy Ham MDiv Board Certified [employer]

## 2021-01-11 NOTE — PROGRESS NOTES
Ventilator check complete; patient has a #7.5 ET tube secured at the 23 at the lip. Patient is not able to follow commands. Breath sounds are coarse. Trachea is midline, Negative for subcutaneous air, and chest excursion is symmetric. Patient is also Negative for cyanosis and is Negative for pitting edema. All alarms are set and audible. Resuscitation bag is at the head of the bed. Ventilator Settings Mode FIO2 Rate Tidal Volume Pressure PEEP I:E Ratio Assist control, VC+  50 %(weaned to 50%)   34 400 ml   14 cm H20  1:1.2 Peak airway pressure: 36 cm H2O Minute ventilation: 12.8 l/min ABG: No results for input(s): PH, PCO2, PO2, HCO3 in the last 72 hours.  
 
 
Jaimee Galvez, RT

## 2021-01-12 NOTE — PROGRESS NOTES
CRRT 8 hours started with R temp IJ catheter, aspirates and flushes well. RO #1 in use. UF rate 500ml/hr Heparin 2000 units bolus and 500 units/hr infusion started. Pt intubated on vent, paralyzed, sedated. Report given to [facility] DALTON MARTINEZ, RN.  
 
 01/12/21 1330 Patient Information Informed Consent Verified Yes First Use Xray Location Verified Yes Dialyzer/Set Up Inspection Dialyzer/Set Up Inspection F-160 Alarms Verified Yes Test Pass Yes  
pH 7.1 Machine Conductivity 14.5 Meter Conductivity 14.3 Reverse Osmosis Safety Checks Reverse Osmosis Machine Log Completed Yes Total Chlorine Test Negative Machine Initiation Machine Number k5 Procedure Start Time 1330 Unused Lines Clamped Yes Machine Temperature 95 °F (35 °C) Dialysis Initiation All Connections Secured Yes NS Bag  Yes Saline Line Double Clamped Yes Prime Given Air Foam Detector Engaged Yes Dialysate NA (mEq/L) 140 Dialysate K (mEq/L) 4 Dialysate CA (mEq/L) 2.5 Dialysate HCO3 (mEq/L) 35 Citrasate No  
During Dialysis Pulse (Heart Rate) (!) 56 /66 Transducer Checks Dry Saline Given (mL) 300 mL Heparin Bolus (units) 2000 units Continuous Heparin Infusion (Units/hr) 500 Units/hr Blood Flow Rate (ml/min) 150 ml/min Dialysate Flow Rate (ml/hr) 300 ml/hr Arterial Access Pressure (mmHg) 80 Venous Return Pressure (mmHg) 50 Transmembrane Pressure (mmHg) 100 mmHg Hourly Chamber Checks Yes Ultrafiltration Rate (ml/hr) 500 ml/hr CRRT Dialysis Intake/Output ([place]) CRRT Replacement Intake (mL) 300 mL  
UF Effluent Output (RMVD) Dialysis (mL) 0 mL Net Fluid Balance (mL) 300 Hemodialysis Access 01/11/21 Placement Date: 01/11/21   Access Location: Internal jugular, right [employer] Being Utilized Yes Criteria for Appropriate Use Dialysis/apheresis Date Accessed  01/12/21 Access Time  [id number] Site Assessment Clean, dry, & intact Date of Last Dressing Change 01/11/21 Dressing Status Clean, dry, & intact Dressing Type Disk with Chlorhexadine gluconate (CHG) Proximal Hub Color/Line Status Yellow; Infusing Distal Hub Color/Line Status Yellow; Infusing  
$$ Dialysis Charges  
$$ Method CRRT 
(8 hours) CRRT Dialysis Intake/Output Patient Location [place]

## 2021-01-12 NOTE — PROGRESS NOTES
[facility] PROGRESS NOTE Follow up for: PRATIMA Subjective:  
Patient seen and examined on [facility] ICU remains intubated on 50 % FiO2, 14 PEEP, sedated. Off pressor but may need to resume for UF with SLED Labs and chart reviewed- with worsening renal function ROS: 
Unable to obtain Objective:  
Exam: 
Vitals:  
 01/12/21 0700 01/12/21 0731 01/12/21 0800 01/12/21 7818 BP: (!) 146/64 (!) 119/59 135/62 Pulse: (!) 47 (!) 43 (!) 45 (!) 52 Resp: 27 (!) 34 (!) 34 (!) 34 Temp: (!) 96.6 °F (35.9 °C) SpO2: 97% 96% 97% 95% Weight:      
Height:      
 
PE: On Vent. Lung: rhonchi bilaterally CV; Regular rate, S1, S2, no Rub Abd: soft, no rebound Ext: + edema Access: right temp line-intact Intake/Output Summary (Last 24 hours) at 1/12/2021 1053 Last data filed at 1/12/2021 6174 Gross per 24 hour Intake 1324.42 ml Output 1300 ml Net 24.42 ml Current Facility-Administered Medications Medication Dose Route Frequency  insulin glargine (LANTUS) injection 30 Units  30 Units SubCUTAneous DAILY  docusate (COLACE) 50 mg/5 mL oral liquid 100 mg  100 mg Per NG tube DAILY  insulin regular (NOVOLIN R, HUMULIN R) injection   SubCUTAneous Q4H  
 dexamethasone (DECADRON) 10 mg/mL injection 6 mg  6 mg IntraVENous Q24H  
 alcohol 62% (NOZIN) nasal  1 Ampule  1 Ampule Topical Q12H  
 [Held by provider] furosemide (LASIX) injection 20 mg  20 mg IntraVENous Q12H  
 NOREPINephrine (LEVOPHED) 4 mg in 5% dextrose 250 mL infusion  0.5-30 mcg/min IntraVENous TITRATE  
 0.9% sodium chloride infusion 250 mL  250 mL IntraVENous PRN  
 0.9% sodium chloride infusion 250 mL  250 mL IntraVENous PRN  
 heparin (porcine) injection 5,000 Units  5,000 Units SubCUTAneous Q8H  
 midazolam in normal saline (VERSED) 1 mg/mL infusion  0-10 mg/hr IntraVENous TITRATE  fentaNYL in normal saline (pf) 25 mcg/mL infusion  0-200 mcg/hr IntraVENous TITRATE  sodium chloride 3% hypertonic nebulizer soln  4 mL Nebulization BID  Vancomycin Pharmacy Intermittent Dosing   Other Rx Dosing/Monitoring  dexmedeTOMidine (PRECEDEX) 400 mcg in 0.9% sodium chloride 100 mL infusion  0.1-1.5 mcg/kg/hr IntraVENous TITRATE  fentaNYL citrate (PF) injection 50 mcg  50 mcg IntraVENous Q2H PRN  
 bisacodyL (DULCOLAX) suppository 10 mg  10 mg Rectal DAILY PRN  
 [Held by provider] sodium zirconium cyclosilicate (LOKELMA) powder packet 5 g  5 g Oral DAILY  NUTRITIONAL SUPPORT ELECTROLYTE PRN ORDERS   Does Not Apply PRN  
 [Held by provider] hydrALAZINE (APRESOLINE) tablet 10 mg  10 mg Per NG tube TID  famotidine (PF) (PEPCID) 20 mg in 0.9% sodium chloride 10 mL injection  20 mg IntraVENous DAILY  dextrose 40% (GLUTOSE) oral gel 1 Tube  15 g Oral PRN  
 glucagon (GLUCAGEN) injection 1 mg  1 mg IntraMUSCular PRN  
 dextrose (D50W) injection syrg 12.5-25 g  25-50 mL IntraVENous PRN  
 albuterol (PROVENTIL HFA, VENTOLIN HFA, PROAIR HFA) inhaler 2 Puff  2 Puff Inhalation Q4H PRN  
 hydrALAZINE (APRESOLINE) 20 mg/mL injection 20 mg  20 mg IntraVENous Q2H PRN  phenol throat spray (CHLORASEPTIC) 1 Spray  1 Spray Oral PRN  
 lip protectant (BLISTEX) ointment 1 Each  1 Each Topical PRN  
 acetaminophen (TYLENOL) tablet 650 mg  650 mg Oral Q6H PRN  
 0.9% sodium chloride infusion 250 mL  250 mL IntraVENous PRN  
 sodium chloride (NS) flush 5-40 mL  5-40 mL IntraVENous Q8H  
 sodium chloride (NS) flush 5-40 mL  5-40 mL IntraVENous PRN  
 albuterol (PROVENTIL VENTOLIN) nebulizer solution 2.5 mg  2.5 mg Nebulization Q6H RT  
 
 
Physical EXAM MD did not go into room GEN - intubated, sedated on vent CV - Regular rate, S1, S2, no Rub Lung - coarse bilaterally Abd -  Soft, non tender, + BS Ext - no edema Recent Labs  
  01/12/21 
0321 01/11/21 
0403 01/10/21 
5356 WBC 7.0 9.9 7.5 HGB 7.7* 7.6* 7.3* HCT 24.4* 24.3* 23.3*  
 182 153 Recent Labs 01/12/21 
0321 01/11/21 
0403 01/10/21 
7099  140 140  
K 5.8* 5.4* 5.2*  
 107 107 CO2 24 23 23 * 144* 121* CREA 4.78* 4.38* 3.98* CA 8.6 8.5 8.5 * 214* 267* MG  --  2.7*  --   
PHOS  --  7.1*  --   
 
 
Assessment and Plan: 1. PRATIMA over CKD Possibly COVID ATN ,  
Baseline 1 to 1.5 , admitted with Cr of 2.8 on 12/24  
-Echo from 2019 - Normal EF and systolic function Worsening azotemia and decreased urine out put Right temp line in place for SLED today for volume and clearance, may need to go back on pressor for volume removal 
 
2. Hyperkalemia- should improve with SLED 
  
 COVID 19 +/acute respiratory failure/ARDs ventilated per pulm Anemia transfuse hgb <7.0 DW palliative care and Pulmonary  
 
 
   
Lamona Fabian, NP I reviewed chart, examined patient and agree with ass/rec of CH[facility] Enriquez. Prognosis seems to be poor. Try dialysis with SED today

## 2021-01-12 NOTE — PROGRESS NOTES
Ventilator check complete; patient has a #7.5 ET tube secured at the 23 at the lip. Patient is sedated. Patient is not able to follow commands. Breath sounds are coarse and diminished. Trachea is midline, Negative for subcutaneous air, and chest excursion is symmetric. Patient is also Negative for cyanosis. All alarms are set and audible. Resuscitation bag is at the head of the bed. Ventilator Settings Mode FIO2 Rate Tidal Volume Pressure PEEP I:E Ratio Assist control, VC+  50 %  34  400 ml    14 cm H20  1:1.2 Peak airway pressure: 43 cm H2O Minute ventilation: 13.8 l/min Priya Azul, RT

## 2021-01-12 NOTE — PROGRESS NOTES
[facility] COVID-19 Note: 
 
Critical Care Note: 1/12/2021 Huma Garcia Admission Date: 12/24/2020     Length of Stay: 19 days Background: 78 y.o. y/o female with acute hypoxemic respiratory failure secondary to COVID-19. Onset of COVID-19 symptoms: 12/24 Notable PMH: DM, CAD, HTN, CKD 
 
24 Hour events:  On vent,on 50 % Fi02 ,sedated, still on PRVC with high peak pressures in low 40s, hypotheramic on warming blanket Renal function worsening, lasix gtt increased yesterday and UOP 1.3L, but clearance on labs is very poor. Daughter made her DNR after discussion with palliative and myself yesterday. ROS: intubated, sedated Lines: (insertion date) Quad Lumen 12/30/20 Anterior; Left Internal jugular Orogastric Tube 12/30/20 Urinary Catheter 01/05/21 [place] Airway - Endotracheal Tube 12/30/20 Oral 
Wound Wrist Left;Dorsal 12/31/20 Wound Ankle Anterior Drips: current dose (range) Versed   3 mg/hr (0-10) Fentanyl  175 mcg/hr (0-300) Visit Vitals /62 Pulse (!) 52 Temp (!) 96.6 °F (35.9 °C) Resp (!) 34 Ht 5' 5\" (1.651 m) Wt 182 lb 1.6 oz (82.6 kg) SpO2 95% BMI 30.30 kg/m² Pertinent Exam:           
Constitutional:  intubated and mechanically ventilated. EENMT:  Sclera clear, pupils equal, oral mucosa moist 
Respiratory: coarse Cardiovascular:  RRR, Gastrointestinal:  soft with no tenderness; positive bowel sounds present Musculoskeletal:  warm with no cyanosis, plus 2 lower extremity edema Skin:  no jaundice or ecchymosis Neurologic:sedated Psychiatric: sedated and paralyzed CXR 1/11: slight improvement on left Pertinent Labs:  
Recent Labs  
  01/12/21 
0321 01/11/21 
0403 01/10/21 
2885 WBC 7.0 9.9 7.5 HGB 7.7* 7.6* 7.3* HCT 24.4* 24.3* 23.3*  
 182 153 Recent Labs  
  01/12/21 
0321 01/11/21 
0403 01/10/21 
6174  140 140  
K 5.8* 5.4* 5.2*  
 107 107 CO2 24 23 23 * 214* 267* * 144* 121* CREA 4.78* 4.38* 3.98* MG  --  2.7*  --   
CA 8.6 8.5 8.5 PHOS  --  7.1*  --   
 
Recent Labs  
  01/12/21 
0738 01/12/21 
0443 01/12/21 
0047 GLUCPOC 262* 291* 311* COVID-19 Meds: 
Dexamethasone 6mg daily (12/24-1/2) Convalescent plasma transfusion (12/24) 
  Anti-infectives (start date): 
Vanc, Zosyn 1/5- Ventilator Settings:  5' 5\" (1.651 m) Mode FIO2 Rate Tidal Volume Pressure PEEP Assist control, VC+  50 %    400 ml  12 cm H2O(Increased post ABG)  14 cm H20 Peak airway pressure: 43 cm H2O Minute ventilation: 13.8 l/min ABG:  
Recent Labs  
  01/12/21 
0350 01/11/21 
0537 01/10/21 
6481 PHI 7.26* 7.24* 7.28* PCO2I 46.0* 50.7* 44.8 PO2I 81 73* 80  
HCO3I 20.6* 21.6* 21.1* Impression: 78 y.o. y/o female with acute hypoxemic respiratory failure secondary to COVID-19. NEURO:  
1. Sedation: Versed gtt 2. Analgesia: Fentanyl gtt CV: 1. Volume Status: not diuresing even with high dose Lasix, nephro to RRT 2. Hypotension: off pressors PULM:  
1. Acute hypoxemic/hypercapneic respiratory failure:  Titrate Min Vent to pH/CO2. Day # 14 on ventilator 2. Severe ARDS 2/2 COVID: Low Tidal Volume ventilation, goal 6cc per kg (Ideal body weight: 57 kg (125 lb 10.6 oz) x 6 = 348ml). Attempt to limit driving pressure to 84IM/O3D or less. Allow permissive hypercapnia/acidosis to pH 7.25 if needed to achieve above. Improved oxygenation, but still stiff with high peak pressures on vent RENAL: 
1. PRATIMA: monitor. Nephro following, Cr worse ,will likely eventually need HD ,likely soon GI: NPO, PPI prophy 1. Nutrition: continue TF 
HEME: no acute issues 1. Thrombocytopenia: stabilized. HIT negative 2. Anticoagulation: d-dimer to determine anticoagulation ppx for VTE. ID: WBC elevated, afebrile. 1. COVID-19: COVID meds as above. Restart Dexa course 1/8-> 
2. MRSA Pneumonia: on Vanco day 7. Repeat sputum culture x 1 ENDO: 
 1. DM: increase lantus 30U daily with hyperglycemia. Skin: no decub, turns, preventive care Prophy: Heparin/H2B 
 
DNR, spoke with daughter today. Plan for RRT today. If she tolerates well or poorly may help guide next steps. Daughter is consider trach if she is showing some signs of progress and tolerates dialysis. The patient is critically ill with respiratory failure, circulatory failure and requires high complexity decision making for assessment and support including frequent ventilator adjustment , frequent evaluation and titration of therapies , application of advanced monitoring technologies and extensive interpretation of multiple databases. Cumulative time devoted to patient care services by me for day of service -35 min Neena Post MD

## 2021-01-12 NOTE — PROGRESS NOTES
Ventilator check complete; patient has a #7.5 ET tube secured at the 24 at the lip. Patient is sedated. Patient is not able to follow commands. Breath sounds are coarse. Trachea is midline, Negative for subcutaneous air, and chest excursion is symmetric. Patient is also Negative for cyanosis. All alarms are set and audible. Resuscitation bag is at the head of the bed. Ventilator Settings Mode FIO2 Rate Tidal Volume Pressure PEEP I:E Ratio Assist control, VC+  50 %   34 400 ml  12 cm H2O(Increased post ABG)  14 cm H20  1:1.2 Peak airway pressure: 40 cm H2O Minute ventilation: 13.5 l/min ABG: No results for input(s): PH, PCO2, PO2, HCO3 in the last 72 hours. Kathye Angelucci

## 2021-01-13 NOTE — PROGRESS NOTES
Comprehensive Nutrition Assessment Type and Reason for Visit: Zeny Desiree This assessment was completed remotely from within the hospital. 
 
Nutrition Recommendations/Plan:  
· Enteral Nutrition: · Continue Nepro via OGT at goal rate of 35ml/hr. · Add 3 packets Prosource TF to be given at 1800 with 50ml free water flush before and after. · Change water flush to 65ml Q4H. · At goal will provide 1632kcal (100% estimated calorie needs), 101gm protein (100% estimated protein needs), and 1101ml free fluid (renal standard). · Vitamin and Mineral Supplement Therapy: · Electrolyte management replacement protocol active. · Labs: · BMP daily, Mg and Phos MWF.  
· Bowel Regimen: · Change liquid colace to PRN Malnutrition Assessment: 
Malnutrition Status: Insufficient data Nutrition Assessment:  
Nutrition History: 12/31: 3 recorded meals in past 6 days with average intake of 83% Nutrition Background: H/O: CAD, HTN, DM, HLD, peripheral neuropathy, CKD stage III. Presented with dyspnea. Findings of +COVID. Admitted to ICU with ARDS, severe acute hypoxic due to COVID PNA, PRATIMA on CKD. Was requiring BIPAP at night and Aviro during day. Had brief cardiac arrest and was intubated 12/30 Daily Update: CRRT started on 1/12 for 8 hours. PC follow up today states continuing with current plan of care unless pt does not tolerate dialysis or becomes unable to oxygenate. Pt to continue with CRRT today. Estimated protein needs re-estimated due to starting dialysis. Abdominal Status (last documented): Intact, Soft abdomen with Active  bowel sounds. Last BM 01/13/21. Pertinent Medications: Dulcolax (PRN), Decadron, Colace (daily/held), SSI (40 units, none given thus far today) Drips: Precedex, Fentanyl, Versed, Levophed (stopped 1/12) Pertinent Labs: Sodium 140, Potassium 4.7, Glucose 69, BUN 63, Cr 2.43, Phos 4.6, GFR 25 
Edema: BUE: +2, LLE: +1 Output: FMS 300ml, CRRT 3957 Nutrition Related Findings: NFPE deferred d/t isolation. Intubated and OGT placed 12/30. Current Nutrition Therapies: DIET NPO 
DIET TUBE FEEDING Please open for rate and water flush Current Tube Feeding (TF) Orders: · Feeding Route: Orogastric · Formula: Vital AF 1.2 · Schedule:Continuous · Regimen: 45ml/hr · Additives/Modulars: (None) · Water Flushes: 95ml Q4H 
· Current TF & Flush Orders Provides: at goal 
· Goal TF & Flush Orders Provides: 1512kcal (100% estimated calorie needs), 68gm protein (100% estimated protein needs), and 1181ml free fluid (<1ml/kcal; anuric). Current Intake: Average Meal Intake: NPO Average Supplement Intake: NPO Additional Caloric Sources: ( ) Anthropometric Measures: 
Height: 5' 5\" (165.1 cm) Current Body Wt: 84.6 kg (186 lb 8.2 oz)(1/13), Weight source: Not specified BMI: 31, Obese class 1 (BMI 30.0-34. 9) Admission Body Weight: 159 lb 2.8 oz(Bedscale) Ideal Body Wt: 125 lbs (57 kg), 123.8 % Usual Body Wt: 73.5 kg (162 lb)(Per EMR 11/5/82020), Percent weight change: -4.5 Estimated Daily Nutrient Needs: 
Energy (kcal/day): 6840-9919 (Kcal/kg(25-30), Weight Used: Ideal(56.8)) Protein (g/day): (1.7-2.5gm/kg-- CRRT) Weight Used: (Ideal) Fluid (ml/day):   (Standard renal) Nutrition Diagnosis:  
· Inadequate oral intake related to impaired respiratory function as evidenced by (intubated, NPO, TF to meet needs) · Increased nutrient needs related to renal dysfunction as evidenced by (CRRT) Nutrition Interventions:  
Food and/or Nutrient Delivery: Continue NPO, Modify tube feeding Coordination of Nutrition Care: Continue to monitor while inpatient Plan of Care discussed with Rich Coulter RN Goals:  
Previous Goal Met: Progressing toward goal(s) Active Goal: Maintain and tolerate estimated nutrition needs at goal rate Nutrition Monitoring and Evaluation:  
  
Food/Nutrient Intake Outcomes: Enteral nutrition intake/tolerance Physical Signs/Symptoms Outcomes: Biochemical data, GI status Discharge Planning: Too soon to determine Electronically signed by Marleny Vargas MS, RDN, LD 1/13/2021 at 1:25 PM 
Contact: [phone] Disaster Mode active

## 2021-01-13 NOTE — PROGRESS NOTES
Palliative Care Progress Note Patient: Omer Santana MRN: [id number]  SSN: [id number] YOB: 1940  Age: 78 y.o. Sex: female Assessment/Plan: Chief Complaint/Interval History: pt sedated, intubated. Tolerated SLED. Principal Diagnosis: · Dyspnea  R06.00 Additional Diagnoses: · Acute Respiratory Failure, Unspecified  J96.00 
· Frailty  R54 · Counseling, Encounter for Medical Advice  Z71.9 
· Encounter for Palliative Care  Z51.5 Palliative Performance Scale (PPS) Medical Decision Making:  
Reviewed and summarized labs and imaging. Spoke with daughter via phone and updated on current condition. Daughter states prior to intubation pt told her to do everything to save her. She states if pt does not tolerate dialysis or becomes unable to oxygenate then she will decide for comfort measures. I discussed trach given extensive intubation time. Daughter agrees to proceed with trach at this time. Will follow and update family. Will discuss findings with members of the interdisciplinary team.   
 
More than 50% of this 25 minute visit was spent counseling and coordination of care as outlined above. Subjective:  
 
Review of Systems unable to obtain due to mentation. Objective:  
 
Visit Vitals BP (!) 107/57 Pulse (!) 58 Temp 98.5 °F (36.9 °C) Resp (!) 36 Ht 5' 5\" (1.651 m) Wt 186 lb 4.6 oz (84.5 kg) SpO2 97% BMI 31.00 kg/m² Physical Exam: 
 
General:  Sedated. No acute distress. Eyes:  Conjunctivae/corneas clear Nose: Nares normal. Septum midline. Neck: Supple, symmetrical, trachea midline, no JVD Lungs:   Coarse bilateral bs Heart:  Regular rate and rhythm, no murmur Abdomen:   Soft, non-tender, non-distended Extremities: Normal, atraumatic, no cyanosis, 4+ edema Skin: Skin color, texture, turgor normal. No rash or lesions. Neurologic: sedated Psych: sedated Signed By: Chance Soliz MD   
 January 13, 2021

## 2021-01-13 NOTE — PROGRESS NOTES
Ventilator check complete; patient has a #7.5 ET tube secured at the 23 at the lip. Patient is sedated. Patient is not able to follow commands. Breath sounds are coarse. Trachea is midline, Negative for subcutaneous air, and chest excursion is symmetric. Patient is also Negative for cyanosis and is Negative for pitting edema. All alarms are set and audible. Resuscitation bag is at the head of the bed. Ventilator Settings Mode FIO2 Rate Tidal Volume Pressure PEEP I:E Ratio Assist control, VC+  50 %    400 ml  12 cm H2O(Increased post ABG)  14 cm H20  1:1.2 Peak airway pressure: 43 cm H2O Minute ventilation: 2.3 l/min Selina Crain RT

## 2021-01-13 NOTE — PROGRESS NOTES
[facility] COVID-19 Note: 
 
Critical Care Note: 1/13/2021 Classie Cockayne Admission Date: 12/24/2020     Length of Stay: 20 days Background: 78 y.o. y/o female with acute hypoxemic respiratory failure secondary to COVID-19. Onset of COVID-19 symptoms: 12/24 Notable PMH: DM, CAD, HTN, CKD 
 
24 Hour events: Tolerated dialysis yesterday with about 2.6 L net output yesterday. Found to have very high airway pressures this AM with RT adjusting vent. RR at 36 and peak Paw down to 36. For dialysis today. ROS: intubated, sedated Lines: (insertion date) Quad Lumen 12/30/20 Anterior; Left Internal jugular Orogastric Tube 12/30/20 Urinary Catheter 01/05/21 [place] Airway - Endotracheal Tube 12/30/20 Oral 
Wound Wrist Left;Dorsal 12/31/20 Wound Ankle Anterior Drips: current dose (range) Versed   6 mg/hr (0-10) Fentanyl  200 mcg/hr (0-300) Precedex 0.2 Visit Vitals /62 Pulse (!) 58 Temp 97.8 °F (36.6 °C) Resp (!) 36 Ht 5' 5\" (1.651 m) Wt 186 lb 4.6 oz (84.5 kg) SpO2 98% BMI 31.00 kg/m² Pertinent Exam:           
Constitutional:  intubated and mechanically ventilated. EENMT:  Sclera clear, pupils equal, oral mucosa moist 
Respiratory: coarse Cardiovascular:  RRR, Gastrointestinal:  soft with no tenderness; positive bowel sounds present Musculoskeletal:  warm with no cyanosis, plus 1 lower extremity edema Skin:  no jaundice or ecchymosis Neurologic:sedated Psychiatric: sedated CXR  
 
1/13: 
 
 
 
 
 
1/11: slight improvement on left Pertinent Labs:  
Recent Labs  
  01/13/21 
0330 01/12/21 
0321 01/11/21 
0403 WBC 11.3* 7.0 9.9 HGB 8.0* 7.7* 7.6* HCT 25.3* 24.4* 24.3*  
 179 182 Recent Labs  
  01/13/21 
0330 01/12/21 
0321 01/11/21 
0403  139 140  
K 4.7 5.8* 5.4*  
 107 107 CO2 31 24 23 GLU 69 282* 214* BUN 63* 159* 144* CREA 2.43* 4.78* 4.38* MG 2.3  --  2.7*  
CA 8.5 8.6 8.5 PHOS 4.6*  --  7.1* Recent Labs  
  01/13/21 
0627 01/12/21 
2350 01/12/21 
2200 GLUCPOC 69 123* 137* COVID-19 Meds: 
Dexamethasone 6mg daily (12/24-1/2) Convalescent plasma transfusion (12/24) 
  Anti-infectives (start date): 
Vanc, Zosyn 1/5- Ventilator Settings:  5' 5\" (1.651 m) Mode FIO2 Rate Tidal Volume Pressure PEEP Assist control, VC+  60 %    350 ml(decreased due to high PIP)  12 cm H2O(Increased post ABG)  14 cm H20 Peak airway pressure: 41 cm H2O Minute ventilation: 13.5 l/min ABG:  
Recent Labs  
  01/13/21 
0359 01/12/21 
0350 01/11/21 
0537 PHI 7.36 7.26* 7.24* PCO2I 50.9* 46.0* 50.7* PO2I 54* 81 73* HCO3I 28.7* 20.6* 21.6* Impression: 78 y.o. y/o female with acute hypoxemic respiratory failure secondary to COVID-19. NEURO:  
1. Sedation: Versed gtt 2. Analgesia: Fentanyl gtt CV: 1. Volume Status:  RRT 2. Hypotension: off pressors PULM:  
1. Acute hypoxemic/hypercapneic respiratory failure:  Titrate Min Vent to pH/CO2. Day # 15 on ventilator. Elisa Mena being considered 2. Severe ARDS 2/2 COVID: Low Tidal Volume ventilation, goal 6cc per kg (Ideal body weight: 57 kg (125 lb 10.6 oz) x 6 = 348ml). Attempt to limit driving pressure to 46ZE/I1A or less. Allow permissive hypercapnia/acidosis to pH 7.25 if needed to achieve above. Improved oxygenation, but still stiff with high peak pressures on vent RENAL: 
1. PRATIMA: For dialysis again today GI: NPO, PPI prophy 1. Nutrition: continue TF 
HEME: no acute issues 1. Thrombocytopenia: stabilized. HIT negative 2. Anticoagulation: d-dimer to determine anticoagulation ppx for VTE. ID: WBC elevated, afebrile. 1. COVID-19: COVID meds as above. Restart Dexa course 1/8-> 
2. MRSA Pneumonia: on Vanco day 8. Stop today. ENDO: 
1. DM: hold lantus 30U daily with hypoglycemia this AM. 
Skin: no decub, turns, preventive care Prophy: Heparin/H2B DNR, spoke with daughter [phone]) today. Plan for RRT again today. Likely trach next week. The patient is critically ill with respiratory failure, circulatory failure and requires high complexity decision making for assessment and support including frequent ventilator adjustment , frequent evaluation and titration of therapies , application of advanced monitoring technologies and extensive interpretation of multiple databases. Cumulative time devoted to patient care services by me for day of service -33 min Nivia Holland MD

## 2021-01-13 NOTE — PROGRESS NOTES
[facility] PROGRESS NOTE Follow up for: PRATIMA Subjective:  
Had SED yesterday, YF: 3900 ml ROS: 
Unable to obtain Objective:  
Exam: 
Vitals:  
 01/13/21 0859 01/13/21 0929 01/13/21 0959 01/13/21 1029 BP: (!) 112/55 (!) 115/58 120/60 119/62 Pulse: 60 (!) 58 (!) 59 (!) 57 Resp: (!) 36 (!) 36 (!) 35 (!) 36 Temp:      
SpO2: 96% 96% 97% 97% Weight:      
Height:      
 
PE: On Vent. Lung: rhonchi bilaterally CV; Regular rate, S1, S2, no Rub Abd: soft, no rebound Ext: + edema Access: right temp line-intact Intake/Output Summary (Last 24 hours) at 1/13/2021 1042 Last data filed at 1/13/2021 0800 Gross per 24 hour Intake 1970.26 ml Output 4607 ml Net -2636.74 ml Current Facility-Administered Medications Medication Dose Route Frequency  heparin (porcine) 1,000 unit/mL injection 5,000 Units  5,000 Units IntraVENous DIALYSIS PRN  
 insulin glargine (LANTUS) injection 30 Units  30 Units SubCUTAneous DAILY  docusate (COLACE) 50 mg/5 mL oral liquid 100 mg  100 mg Per NG tube DAILY  insulin regular (NOVOLIN R, HUMULIN R) injection   SubCUTAneous Q4H  
 dexamethasone (DECADRON) 10 mg/mL injection 6 mg  6 mg IntraVENous Q24H  
 alcohol 62% (NOZIN) nasal  1 Ampule  1 Ampule Topical Q12H  
 [Held by provider] furosemide (LASIX) injection 20 mg  20 mg IntraVENous Q12H  
 NOREPINephrine (LEVOPHED) 4 mg in 5% dextrose 250 mL infusion  0.5-30 mcg/min IntraVENous TITRATE  
 0.9% sodium chloride infusion 250 mL  250 mL IntraVENous PRN  
 0.9% sodium chloride infusion 250 mL  250 mL IntraVENous PRN  
 heparin (porcine) injection 5,000 Units  5,000 Units SubCUTAneous Q8H  
 midazolam in normal saline (VERSED) 1 mg/mL infusion  0-10 mg/hr IntraVENous TITRATE  fentaNYL in normal saline (pf) 25 mcg/mL infusion  0-200 mcg/hr IntraVENous TITRATE  sodium chloride 3% hypertonic nebulizer soln  4 mL Nebulization BID  
  Vancomycin Pharmacy Intermittent Dosing   Other Rx Dosing/Monitoring  dexmedeTOMidine (PRECEDEX) 400 mcg in 0.9% sodium chloride 100 mL infusion  0.1-1.5 mcg/kg/hr IntraVENous TITRATE  fentaNYL citrate (PF) injection 50 mcg  50 mcg IntraVENous Q2H PRN  
 bisacodyL (DULCOLAX) suppository 10 mg  10 mg Rectal DAILY PRN  
 [Held by provider] sodium zirconium cyclosilicate (LOKELMA) powder packet 5 g  5 g Oral DAILY  NUTRITIONAL SUPPORT ELECTROLYTE PRN ORDERS   Does Not Apply PRN  
 [Held by provider] hydrALAZINE (APRESOLINE) tablet 10 mg  10 mg Per NG tube TID  famotidine (PF) (PEPCID) 20 mg in 0.9% sodium chloride 10 mL injection  20 mg IntraVENous DAILY  dextrose 40% (GLUTOSE) oral gel 1 Tube  15 g Oral PRN  
 glucagon (GLUCAGEN) injection 1 mg  1 mg IntraMUSCular PRN  
 dextrose (D50W) injection syrg 12.5-25 g  25-50 mL IntraVENous PRN  
 albuterol (PROVENTIL HFA, VENTOLIN HFA, PROAIR HFA) inhaler 2 Puff  2 Puff Inhalation Q4H PRN  
 hydrALAZINE (APRESOLINE) 20 mg/mL injection 20 mg  20 mg IntraVENous Q2H PRN  phenol throat spray (CHLORASEPTIC) 1 Spray  1 Spray Oral PRN  
 lip protectant (BLISTEX) ointment 1 Each  1 Each Topical PRN  
 acetaminophen (TYLENOL) tablet 650 mg  650 mg Oral Q6H PRN  
 0.9% sodium chloride infusion 250 mL  250 mL IntraVENous PRN  
 sodium chloride (NS) flush 5-40 mL  5-40 mL IntraVENous Q8H  
 sodium chloride (NS) flush 5-40 mL  5-40 mL IntraVENous PRN  
 albuterol (PROVENTIL VENTOLIN) nebulizer solution 2.5 mg  2.5 mg Nebulization Q6H RT  
 
 
Physical EXAM MD did not go into room GEN - intubated, sedated on vent CV - Regular rate, S1, S2, no Rub Lung - coarse bilaterally Abd -  Soft, non tender, + BS Ext - no edema Recent Labs  
  01/13/21 
0330 01/12/21 
0321 01/11/21 
0403 WBC 11.3* 7.0 9.9 HGB 8.0* 7.7* 7.6* HCT 25.3* 24.4* 24.3*  
 179 182 Recent Labs  
  01/13/21 
0330 01/12/21 
0321 01/11/21 
0403  139 140 K 4.7 5.8* 5.4*  
 107 107 CO2 31 24 23 BUN 63* 159* 144* CREA 2.43* 4.78* 4.38* CA 8.5 8.6 8.5 GLU 69 282* 214* MG 2.3  --  2.7* PHOS 4.6*  --  7.1* Assessment and Plan: 1. PRATIMA over CKD Possibly COVID ATN ,  
SED again today 2. Hyperkalemia-resolved  
  
 COVID 19 +/acute respiratory failure/ARDs ventilated per pulm Anemia ok  
 
 
 
 
   
Brittni Melton MD

## 2021-01-13 NOTE — DIALYSIS
8 HR SLED initiated using  Right IJ catheter. Aspirated and flushed both catheter ports without problem. Machine settings per MD order. 150  DFR  500 UFR Heparin 2000 unit bolus and 500 ml/hr. Reported to primary nurse. Dialysis nurse available as needed.

## 2021-01-13 NOTE — PROGRESS NOTES
Ventilator check complete; patient has a #7.5 ET tube secured at the 22 at the lip. Patient is sedated. Patient is not able to follow commands. Breath sounds are coarse. Trachea is midline, Negative for subcutaneous air, and chest excursion is symmetric. Patient is also Negative for cyanosis and is Negative for pitting edema. All alarms are set and audible. Resuscitation bag is at the head of the bed. Ventilator Settings Mode FIO2 Rate Tidal Volume Pressure PEEP I:E Ratio Assist control, VC+  60 %   36 350 ml(decreased due to high PIP)  12 cm H2O(Increased post ABG)  14 cm H20  1:1.2 Peak airway pressure: 41 cm H2O Minute ventilation: 13.5 l/min ABG: No results for input(s): PH, PCO2, PO2, HCO3 in the last 72 hours.  
 
 
Kevin Ramos, RT

## 2021-01-14 NOTE — PROGRESS NOTES
Ventilator check complete; patient has a #7.5 ET tube secured at the 23 at the lip. Patient is sedated. Patient is not able to follow commands. Breath sounds are coarse. Trachea is midline, Negative for subcutaneous air, and chest excursion is symmetric. Patient is also Negative for cyanosis and is Negative for pitting edema. All alarms are set and audible. Resuscitation bag is at the head of the bed. Ventilator Settings Mode FIO2 Rate Tidal Volume PEEP I:E Ratio  
VC+, Assist control  70 %   32 400 ml  12 cm H20  1:1.35 Peak airway pressure: 39 cm H2O Minute ventilation: 13.7 l/min ABG: No results for input(s): PH, PCO2, PO2, HCO3 in the last 72 hours.  
 
 
Lacy Siddiqui, RT

## 2021-01-14 NOTE — PROGRESS NOTES
[facility] PROGRESS NOTE Follow up for: PRATIMA Subjective:  
Patient seen and examined in [facility] ICU remains intubated on vent 70% FiO2, 12 PEEP, poor uop.  ICU RN 
 
ROS: 
Unable to obtain Objective:  
Exam: 
Vitals:  
 01/14/21 1015 01/14/21 1030 01/14/21 1045 01/14/21 1100 BP: (!) 158/77 124/64 (!) 170/85 (!) 171/82 Pulse: 64 (!) 57 64 (!) 57 Resp: (!) 32 (!) 31 (!) 32 (!) 32 Temp:   97.6 °F (36.4 °C) SpO2: 99% 97% 94% 98% Weight:      
Height:      
 
PE: 
General: intubated on Vent. Lung: rhonchi bilaterally CV; Regular rate, S1, S2, no Rub Abd: soft, no rebound Ext: + edema Access: right temp line-intact Intake/Output Summary (Last 24 hours) at 1/14/2021 1131 Last data filed at 1/14/2021 1046 Gross per 24 hour Intake 2135.86 ml Output 4841 ml Net -2705.14 ml Current Facility-Administered Medications Medication Dose Route Frequency  docusate (COLACE) 50 mg/5 mL oral liquid 100 mg  100 mg Per NG tube DAILY PRN  
 heparin (porcine) 1,000 unit/mL injection 5,000 Units  5,000 Units IntraVENous DIALYSIS PRN  
 insulin regular (NOVOLIN R, HUMULIN R) injection   SubCUTAneous Q4H  
 dexamethasone (DECADRON) 10 mg/mL injection 6 mg  6 mg IntraVENous Q24H  
 alcohol 62% (NOZIN) nasal  1 Ampule  1 Ampule Topical Q12H  
 [Held by provider] furosemide (LASIX) injection 20 mg  20 mg IntraVENous Q12H  
 NOREPINephrine (LEVOPHED) 4 mg in 5% dextrose 250 mL infusion  0.5-30 mcg/min IntraVENous TITRATE  
 0.9% sodium chloride infusion 250 mL  250 mL IntraVENous PRN  
 0.9% sodium chloride infusion 250 mL  250 mL IntraVENous PRN  
 heparin (porcine) injection 5,000 Units  5,000 Units SubCUTAneous Q8H  
 midazolam in normal saline (VERSED) 1 mg/mL infusion  0-10 mg/hr IntraVENous TITRATE  fentaNYL in normal saline (pf) 25 mcg/mL infusion  0-200 mcg/hr IntraVENous TITRATE  sodium chloride 3% hypertonic nebulizer soln  4 mL Nebulization BID  
  Vancomycin Pharmacy Intermittent Dosing   Other Rx Dosing/Monitoring  dexmedeTOMidine (PRECEDEX) 400 mcg in 0.9% sodium chloride 100 mL infusion  0.1-1.5 mcg/kg/hr IntraVENous TITRATE  fentaNYL citrate (PF) injection 50 mcg  50 mcg IntraVENous Q2H PRN  
 bisacodyL (DULCOLAX) suppository 10 mg  10 mg Rectal DAILY PRN  
 [Held by provider] sodium zirconium cyclosilicate (LOKELMA) powder packet 5 g  5 g Oral DAILY  NUTRITIONAL SUPPORT ELECTROLYTE PRN ORDERS   Does Not Apply PRN  
 [Held by provider] hydrALAZINE (APRESOLINE) tablet 10 mg  10 mg Per NG tube TID  famotidine (PF) (PEPCID) 20 mg in 0.9% sodium chloride 10 mL injection  20 mg IntraVENous DAILY  dextrose 40% (GLUTOSE) oral gel 1 Tube  15 g Oral PRN  
 glucagon (GLUCAGEN) injection 1 mg  1 mg IntraMUSCular PRN  
 dextrose (D50W) injection syrg 12.5-25 g  25-50 mL IntraVENous PRN  
 albuterol (PROVENTIL HFA, VENTOLIN HFA, PROAIR HFA) inhaler 2 Puff  2 Puff Inhalation Q4H PRN  
 hydrALAZINE (APRESOLINE) 20 mg/mL injection 20 mg  20 mg IntraVENous Q2H PRN  phenol throat spray (CHLORASEPTIC) 1 Spray  1 Spray Oral PRN  
 lip protectant (BLISTEX) ointment 1 Each  1 Each Topical PRN  
 acetaminophen (TYLENOL) tablet 650 mg  650 mg Oral Q6H PRN  
 0.9% sodium chloride infusion 250 mL  250 mL IntraVENous PRN  
 sodium chloride (NS) flush 5-40 mL  5-40 mL IntraVENous Q8H  
 sodium chloride (NS) flush 5-40 mL  5-40 mL IntraVENous PRN  
 albuterol (PROVENTIL VENTOLIN) nebulizer solution 2.5 mg  2.5 mg Nebulization Q6H RT  
 
 
Physical EXAM MD did not go into room GEN - intubated, sedated on vent CV - Regular rate, S1, S2, no Rub Lung - coarse bilaterally Abd -  Soft, non tender, + BS Ext - 1+ BLE edema Recent Labs  
  01/14/21 
0341 01/13/21 
0330 01/12/21 
0321 WBC 11.5* 11.3* 7.0 HGB 8.8* 8.0* 7.7* HCT 27.7* 25.3* 24.4*  
 200 179 Recent Labs  
  01/14/21 
0341 01/13/21 
0330 01/12/21 
0321  140 139  
K 4.4 4.7 5.8*  
 105 107 CO2 31 31 24 BUN 31* 63* 159* CREA 1.54* 2.43* 4.78* CA 8.4 8.5 8.6 * 69 282* MG  --  2.3  --   
PHOS  --  4.6*  --   
 
 
Assessment and Plan: 1. PRATIMA over CKD Possibly COVID ATN , check labs in AM will decide on further SED day by day 2. Hyperkalemia-resolved  
  
3. COVID 19 +/acute respiratory failure/ARDs ventilated per pulm 4.  Anemia ok  
 
 
 
 
   
[place] Bio, NP

## 2021-01-14 NOTE — PROGRESS NOTES
Critical Care Daily Progress Note: 1/14/2021 Admission Date: 12/24/2020 The patient's chart is reviewed and the patient is discussed with the staff. Admission Date: 12/24/2020     Length of Stay: 21 days 
  
Background: 78 y.o. y/o female with acute hypoxemic respiratory failure secondary to COVID-19. Onset of COVID-19 symptoms: 12/24  
  
Notable PMH: DM, CAD, HTN, CKD 
  
 
ROS: intubated, sedated Lines: (insertion date) Quad Lumen 12/30/20 Anterior; Left Internal jugular Orogastric Tube 12/30/20 Urinary Catheter 01/05/21 [place] Airway - Endotracheal Tube 12/30/20 Oral 
Wound Wrist Left;Dorsal 12/31/20 Wound Ankle Anterior 
  
Drips: current dose (range) Versed   6 mg/hr (0-10) Fentanyl  200 mcg/hr (0-300) Precedex 0.2 Subjective:  
Ventilator Settings Mode FIO2 Rate Tidal Volume PEEP I:E Ratio  
VC+, Assist control  70 %   32 400 ml  12 cm H20  1:1.35   
  
Peak airway pressure: 39 cm H2O Minute ventilation: 13.7 l/min  
8 hours of sled yesterday [id number] Current Facility-Administered Medications Medication Dose Route Frequency  docusate (COLACE) 50 mg/5 mL oral liquid 100 mg  100 mg Per NG tube DAILY PRN  
 heparin (porcine) 1,000 unit/mL injection 5,000 Units  5,000 Units IntraVENous DIALYSIS PRN  
 insulin regular (NOVOLIN R, HUMULIN R) injection   SubCUTAneous Q4H  
 dexamethasone (DECADRON) 10 mg/mL injection 6 mg  6 mg IntraVENous Q24H  
 alcohol 62% (NOZIN) nasal  1 Ampule  1 Ampule Topical Q12H  
 [Held by provider] furosemide (LASIX) injection 20 mg  20 mg IntraVENous Q12H  
 NOREPINephrine (LEVOPHED) 4 mg in 5% dextrose 250 mL infusion  0.5-30 mcg/min IntraVENous TITRATE  
 0.9% sodium chloride infusion 250 mL  250 mL IntraVENous PRN  
 0.9% sodium chloride infusion 250 mL  250 mL IntraVENous PRN  
 heparin (porcine) injection 5,000 Units  5,000 Units SubCUTAneous Q8H  
  midazolam in normal saline (VERSED) 1 mg/mL infusion  0-10 mg/hr IntraVENous TITRATE  fentaNYL in normal saline (pf) 25 mcg/mL infusion  0-200 mcg/hr IntraVENous TITRATE  sodium chloride 3% hypertonic nebulizer soln  4 mL Nebulization BID  Vancomycin Pharmacy Intermittent Dosing   Other Rx Dosing/Monitoring  dexmedeTOMidine (PRECEDEX) 400 mcg in 0.9% sodium chloride 100 mL infusion  0.1-1.5 mcg/kg/hr IntraVENous TITRATE  fentaNYL citrate (PF) injection 50 mcg  50 mcg IntraVENous Q2H PRN  
 bisacodyL (DULCOLAX) suppository 10 mg  10 mg Rectal DAILY PRN  
 [Held by provider] sodium zirconium cyclosilicate (LOKELMA) powder packet 5 g  5 g Oral DAILY  NUTRITIONAL SUPPORT ELECTROLYTE PRN ORDERS   Does Not Apply PRN  
 [Held by provider] hydrALAZINE (APRESOLINE) tablet 10 mg  10 mg Per NG tube TID  famotidine (PF) (PEPCID) 20 mg in 0.9% sodium chloride 10 mL injection  20 mg IntraVENous DAILY  dextrose 40% (GLUTOSE) oral gel 1 Tube  15 g Oral PRN  
 glucagon (GLUCAGEN) injection 1 mg  1 mg IntraMUSCular PRN  
 dextrose (D50W) injection syrg 12.5-25 g  25-50 mL IntraVENous PRN  
 albuterol (PROVENTIL HFA, VENTOLIN HFA, PROAIR HFA) inhaler 2 Puff  2 Puff Inhalation Q4H PRN  
 hydrALAZINE (APRESOLINE) 20 mg/mL injection 20 mg  20 mg IntraVENous Q2H PRN  phenol throat spray (CHLORASEPTIC) 1 Spray  1 Spray Oral PRN  
 lip protectant (BLISTEX) ointment 1 Each  1 Each Topical PRN  
 acetaminophen (TYLENOL) tablet 650 mg  650 mg Oral Q6H PRN  
 0.9% sodium chloride infusion 250 mL  250 mL IntraVENous PRN  
 sodium chloride (NS) flush 5-40 mL  5-40 mL IntraVENous Q8H  
 sodium chloride (NS) flush 5-40 mL  5-40 mL IntraVENous PRN  
 albuterol (PROVENTIL VENTOLIN) nebulizer solution 2.5 mg  2.5 mg Nebulization Q6H RT  
 
 
Review of Systems Unobtainable due to patient status. Objective:  
 
Vitals:  
 01/14/21 0815 01/14/21 0830 01/14/21 0845 01/14/21 0900 BP: (!) 147/70 (!) 160/72 (!) 163/79 138/67 Pulse: 62 76 80 64 Resp: (!) 32 (!) 31 (!) 32 (!) 32 Temp:      
SpO2: 99% 95% 97% 98% Weight:      
Height:      
 
 
 
Intake/Output Summary (Last 24 hours) at 1/14/2021 8996 Last data filed at 1/14/2021 0730 Gross per 24 hour Intake 2135.86 ml Output 4796 ml Net -2660.14 ml Physical Exam:         
Constitutional:  intubated and mechanically ventilated. EENMT:  Sclera clear, pupils equal, oral mucosa moist 
Respiratory: crackles Cardiovascular:  RRR with no M,G,R; 
Gastrointestinal:  soft with no tenderness; positive bowel sounds present Musculoskeletal:  warm with no cyanosis, no lower extremity edema Skin:  no jaundice or ecchymosis Neurologic: no gross neuro deficits Psychiatric:  Sedated but moves around some CXR:   
None today Ventilator Settings Mode FIO2 Rate Tidal Volume Pressure PEEP  
VC+, Assist control  70 %    400 ml  12 cm H2O  12 cm H20 Peak airway pressure: 39 cm H2O Minute ventilation: 13.7 l/min ABG:  
Recent Labs  
  01/14/21 
0311 01/13/21 
1211 01/13/21 
0359 PHI 7.34* 7.36 7.36  
PCO2I 57.9* 49.4* 50.9*  
PO2I 55* 72* 54* HCO3I 30.9* 27.9* 28.7* LAB Recent Labs  
  01/14/21 
0819 01/13/21 
2332 01/13/21 
2000 01/13/21 
1549 01/13/21 
2692 GLUCPOC 186* 148* 146* 155* 69 Recent Labs  
  01/14/21 
0341 01/13/21 
0330 01/12/21 
0321 WBC 11.5* 11.3* 7.0 HGB 8.8* 8.0* 7.7* HCT 27.7* 25.3* 24.4*  
 200 179 Recent Labs  
  01/14/21 
0341 01/13/21 
0330 01/12/21 
0321  140 139  
K 4.4 4.7 5.8*  
 105 107 CO2 31 31 24 * 69 282* BUN 31* 63* 159* CREA 1.54* 2.43* 4.78* MG  --  2.3  --   
PHOS  --  4.6*  --   
CA 8.4 8.5 8.6 No results for input(s): LCAD, LAC in the last 72 hours. Patient Active Problem List  
Diagnosis Code  Diabetes mellitus with hyperosmolarity without hyperglycemic hyperosmolar nonketotic coma ([facility] [address] E11.00  
  Coronary artery disease involving coronary bypass graft of native heart without angina pectoris I25.810  
 Uncontrolled type 2 diabetes mellitus with hyperglycemia (HCC) E11.65  
 Essential hypertension I10  
 HLD (hyperlipidemia) E78.5  Chronic kidney disease, stage III (moderate) N18.30  
 Osteopenia M85.80  Vitamin D deficiency E55.9  Gastroesophageal reflux disease without esophagitis K21.9  Other allergic rhinitis J30.89  Peripheral neuropathy G62.9  
 Primary osteoarthritis involving multiple joints M89.49  
 Insomnia G47.00  RLS (restless legs syndrome) G25.81  
 Acute renal failure (ARF) (HCC) N17.9  Elevated liver function tests R79.89  
 Anemia D64.9  Right lower quadrant abdominal pain R10.31  
 Gram-negative bacteremia R78.81  
 Acute pancreatitis K85.90  Generalized weakness R53.1  Decreased oral intake R63.8  Acute on chronic renal failure (HCC) N17.9, N18.9  Dehydration E86.0  Diabetic ketoacidosis ([facility]) E11.10  Hypothermia T68. Angie Wiggins  Metabolic acidosis K79.0  Noncompliance Z91.19  
 Hypoalbuminemia due to protein-calorie malnutrition ([facility] [address] E88.09, E46  
 COVID-19 U07.1  Acute respiratory distress syndrome (ARDS) due to severe acute respiratory syndrome coronavirus 2 (SARS-CoV-2) ([facility]) U07.1, J80  Acute hypoxemic respiratory failure ([facility]) J96.01  
 Acute cystitis without hematuria N30.00  Cardiac arrest ([facility] [address] I46.9 Assessment:  (Medical Decision Making) Hospital Problems  Date Reviewed: 8/10/2019 Codes Class Noted POA Cardiac arrest [facility]) ICD-10-CM: I46.9 ICD-9-CM: 427.5  12/30/2020 Unknown Acute cystitis without hematuria ICD-10-CM: N30.00 ICD-9-CM: 595.0  12/29/2020 Unknown COVID-19 ICD-10-CM: U07.1 ICD-9-CM: 079.89  12/24/2020 Unknown * (Principal) Acute respiratory distress syndrome (ARDS) due to severe acute respiratory syndrome coronavirus 2 (SARS-CoV-2) ([facility]) ICD-10-CM: U07.1, J80 
ICD-9-CM: 518.82, 079.89  12/24/2020 Unknown Acute hypoxemic respiratory failure ([facility] [address] ICD-10-CM: J96.01 
ICD-9-CM: 518.81  12/24/2020 Unknown Acute on chronic renal failure ([facility]) ICD-10-CM: N17.9, N18.9 ICD-9-CM: 584.9, 585.9  9/17/2019 Unknown Worse oxygenation this am  
 Coronary artery disease involving coronary bypass graft of native heart without angina pectoris (Chronic) ICD-10-CM: S96.706 ICD-9-CM: 414.05  6/15/2015 Yes Uncontrolled type 2 diabetes mellitus with hyperglycemia ([facility]) (Chronic) ICD-10-CM: E11.65 ICD-9-CM: 250.02  6/15/2015 Yes Diabetes mellitus with hyperosmolarity without hyperglycemic hyperosmolar nonketotic coma ([facility]) (Chronic) ICD-10-CM: E11.00 ICD-9-CM: 250.20  11/24/2014 Yes Plan:  (Medical Decision Making) 1  NEURO:  
1. Sedation: Versed gtt 2. Analgesia: Fentanyl gtt CV: 1. Volume Status:  RRT 2. Hypotension: off pressors PULM:  
1. Acute hypoxemic/hypercapneic respiratory failure:  Titrate Min Vent to pH/CO2.   Day # 16 on ventilator. Trach next week, having to go up on O2, co2 worse also 2. Severe ARDS 2/2 COVID: Low Tidal Volume ventilation, goal 6cc per kg (Ideal body weight: 57 kg (125 lb 10.6 oz) x 6 = 348ml). Attempt to limit driving pressure to 11PR/H4U or less. Allow permissive hypercapnia/acidosis to pH 7.25 if needed to achieve above. Improved oxygenation, but still stiff with high peak pressures on vent RENAL: 
1. PRATIMA: renal to come this am-making some urine GI: NPO, PPI prophy 1. Nutrition: continue TF 
HEME: no acute issues 1. Thrombocytopenia: stabilized.  HIT negative 2. Anticoagulation: d-dimer to determine anticoagulation ppx for VTE. ID: WBC elevated, afebrile. 1. COVID-19: COVID meds as above.  Restart Dexa course 1/8-> 
 2. MRSA Pneumonia: on Vanco day 8. Stop today. ENDO: 
1. DM: hold lantus 30U daily with hypoglycemia this AM. 
Skin: no decub, turns, preventive care DXDAGE: [id number] 
-- 
DNR, called daughter [phone]) today-left message Critical care time 36 minutes More than 50% of the time documented was spent in face-to-face contact with the patient and in the care of the patient on the floor/unit where the patient is located.  
 
Torrey Casarez MD

## 2021-01-14 NOTE — PROGRESS NOTES
's phone call with daughter Kevin Howe. She states patient has improved some although she understands Ms. Jeffry Shepard is \"not out of the woods. \" I offered spiritual/emotional support including affirmation of sonia and hope, exploration of coping skills, and assurance of prayers. Mayo Medel MDiv Board Certified [employer]

## 2021-01-14 NOTE — PROGRESS NOTES
Ventilator check complete; patient has a #7.5 ET tube secured at the 23 at the lip. Patient is sedated. Patient is not able to follow commands. Breath sounds are coarse and diminished. Trachea is midline, Negative for subcutaneous air, and chest excursion is symmetric. Patient is also Negative for cyanosis and is Negative for pitting edema. All alarms are set and audible. Resuscitation bag is at the head of the bed. Ventilator Settings Mode FIO2 Rate Tidal Volume PEEP I:E Ratio VC+  50 %   32 bpm 350 ml  12 cm H20  1:1.35 Peak airway pressure: 36 cm H2O Minute ventilation: 12.8 l/min Luz Marina Mahan RT

## 2021-01-14 NOTE — PROGRESS NOTES
LOS 20d Chart reviewed by  for discharge planning. Pt remains in the BMT/ICU. Pt is currently on the vent 70% Fi02 Pt is being seen by palliative care and is now a DNR. Currently receiving CRRT. Possible trach next week. Discharge plan is possible LTACH for next LOC Referral to be sent to [facility] Will continue to follow for discharge planning needs Please consult  if any new issues arise

## 2021-01-15 NOTE — PROGRESS NOTES
[facility] PROGRESS NOTE Follow up for: PRATIMA Subjective:  
Patient seen and examined in [facility] ICU remains intubated on vent 60% FiO2, 12 PEEP, sedated, off pressor currently. SHAMIKA ICU RN at bedside ROS: 
Unable to obtain Objective:  
Exam: 
Vitals:  
 01/15/21 0711 01/15/21 0715 01/15/21 0730 01/15/21 0745 BP:  (!) 108/50 (!) 119/57 135/60 Pulse: (!) 107 99 99 (!) 108 Resp: (!) 33 28 28 (!) 36 Temp:      
SpO2: 97% 97% 93% 94% Weight:      
Height:      
 
PE: 
General: intubated on Vent. Lung: rhonchi bilaterally CV; Regular rate, S1, S2, no Rub Abd: soft, no rebound Ext: + edema Access: right temp line-intact Intake/Output Summary (Last 24 hours) at 1/15/2021 4349 Last data filed at 1/15/2021 5660 Gross per 24 hour Intake 3312.83 ml Output 445 ml Net 2867.83 ml Current Facility-Administered Medications Medication Dose Route Frequency  DOPamine (INTROPIN) 800 mg in dextrose 5% 250 mL infusion  0-20 mcg/kg/min IntraVENous TITRATE  docusate (COLACE) 50 mg/5 mL oral liquid 100 mg  100 mg Per NG tube DAILY PRN  
 heparin (porcine) 1,000 unit/mL injection 5,000 Units  5,000 Units IntraVENous DIALYSIS PRN  
 insulin regular (NOVOLIN R, HUMULIN R) injection   SubCUTAneous Q4H  
 dexamethasone (DECADRON) 10 mg/mL injection 6 mg  6 mg IntraVENous Q24H  
 alcohol 62% (NOZIN) nasal  1 Ampule  1 Ampule Topical Q12H  
 [Held by provider] furosemide (LASIX) injection 20 mg  20 mg IntraVENous Q12H  
 NOREPINephrine (LEVOPHED) 4 mg in 5% dextrose 250 mL infusion  0.5-30 mcg/min IntraVENous TITRATE  
 0.9% sodium chloride infusion 250 mL  250 mL IntraVENous PRN  
 0.9% sodium chloride infusion 250 mL  250 mL IntraVENous PRN  
 heparin (porcine) injection 5,000 Units  5,000 Units SubCUTAneous Q8H  
 midazolam in normal saline (VERSED) 1 mg/mL infusion  0-10 mg/hr IntraVENous TITRATE  fentaNYL in normal saline (pf) 25 mcg/mL infusion  0-200 mcg/hr IntraVENous TITRATE  sodium chloride 3% hypertonic nebulizer soln  4 mL Nebulization BID  dexmedeTOMidine (PRECEDEX) 400 mcg in 0.9% sodium chloride 100 mL infusion  0.1-1.5 mcg/kg/hr IntraVENous TITRATE  fentaNYL citrate (PF) injection 50 mcg  50 mcg IntraVENous Q2H PRN  
 bisacodyL (DULCOLAX) suppository 10 mg  10 mg Rectal DAILY PRN  
 [Held by provider] sodium zirconium cyclosilicate (LOKELMA) powder packet 5 g  5 g Oral DAILY  NUTRITIONAL SUPPORT ELECTROLYTE PRN ORDERS   Does Not Apply PRN  
 [Held by provider] hydrALAZINE (APRESOLINE) tablet 10 mg  10 mg Per NG tube TID  famotidine (PF) (PEPCID) 20 mg in 0.9% sodium chloride 10 mL injection  20 mg IntraVENous DAILY  dextrose 40% (GLUTOSE) oral gel 1 Tube  15 g Oral PRN  
 glucagon (GLUCAGEN) injection 1 mg  1 mg IntraMUSCular PRN  
 dextrose (D50W) injection syrg 12.5-25 g  25-50 mL IntraVENous PRN  
 albuterol (PROVENTIL HFA, VENTOLIN HFA, PROAIR HFA) inhaler 2 Puff  2 Puff Inhalation Q4H PRN  
 hydrALAZINE (APRESOLINE) 20 mg/mL injection 20 mg  20 mg IntraVENous Q2H PRN  phenol throat spray (CHLORASEPTIC) 1 Spray  1 Spray Oral PRN  
 lip protectant (BLISTEX) ointment 1 Each  1 Each Topical PRN  
 acetaminophen (TYLENOL) tablet 650 mg  650 mg Oral Q6H PRN  
 0.9% sodium chloride infusion 250 mL  250 mL IntraVENous PRN  
 sodium chloride (NS) flush 5-40 mL  5-40 mL IntraVENous Q8H  
 sodium chloride (NS) flush 5-40 mL  5-40 mL IntraVENous PRN  
 albuterol (PROVENTIL VENTOLIN) nebulizer solution 2.5 mg  2.5 mg Nebulization Q6H RT  
 
 
Physical EXAM MD did not go into room GEN - intubated, sedated on vent CV - Regular rate, S1, S2, no Rub Lung - coarse bilaterally Abd -  Soft, non tender, + BS Ext - 1+ BLE edema Recent Labs  
  01/15/21 
0342 01/14/21 
0341 01/13/21 
0330 WBC 11.4* 11.5* 11.3* HGB 8.6* 8.8* 8.0*  
HCT 27.2* 27.7* 25.3*  
  171 200 Recent Labs  
  01/15/21 
0342 01/14/21 
0341 01/13/21 
0330  139 140  
K 5.2* 4.4 4.7  104 105 CO2 30 31 31 BUN 57* 31* 63* CREA 2.29* 1.54* 2.43* CA 8.7 8.4 8.5 * 162* 69  
MG 2.3  --  2.3 PHOS 5.5*  --  4.6* Assessment and Plan: 1. PRATIMA over CKD Possibly COVID ATN , SED today for volume and clearance 2. Hyperkalemia-should improve with dialysis  
  
3. COVID 19 +/acute respiratory failure/ARDs ventilated per pulm 4. Anemia stable Velora Foil, NP

## 2021-01-15 NOTE — PROGRESS NOTES
8 HR SLED initiated using  CVC, right IJ. Aspirated and flushed both catheter ports without problem. Machine settings per MD order. 150  DFR  300ml/min UFR Heparin 3000 unit bolus and 500 units/hr. Reported to primary nurse. Dialysis nurse available as needed. 01/15/21 5865 Patient Information Informed Consent Verified Yes First Use Xray Location Verified Yes Dialyzer/Set Up Inspection Dialyzer/Set Up Inspection F-6 Alarms Verified Yes Test Pass Yes  
pH 7.1 Machine Conductivity 14.2 Meter Conductivity 14.2 Reverse Osmosis Safety Checks Reverse Osmosis Machine Log Completed Yes Total Chlorine Test Negative Machine Initiation Machine Number [id number] Procedure Start Time 8981 Unused Lines Clamped Yes Machine Temperature 95.4 °F (35.2 °C) Dialysis Initiation All Connections Secured Yes NS Bag  Yes Saline Line Double Clamped Yes Prime Given Air Foam Detector Engaged Yes Dialysate NA (mEq/L) 140 Dialysate K (mEq/L) 4 Dialysate CA (mEq/L) 2.5 Dialysate HCO3 (mEq/L) 35 Citrasate No  
During Dialysis Pulse (Heart Rate) 97  
BP (!) 105/53 Transducer Checks Dry Saline Given (mL) 300 mL Heparin Bolus (units) 3000 units Continuous Heparin Infusion (Units/hr) 500 Units/hr Blood Flow Rate (ml/min) 150 ml/min Dialysate Flow Rate (ml/hr) 300 ml/hr Arterial Access Pressure (mmHg) 80 Venous Return Pressure (mmHg) 40 Transmembrane Pressure (mmHg) 80 mmHg Hourly Chamber Checks Yes Ultrafiltration Rate (ml/hr) 300 ml/hr CRRT Dialysis Intake/Output ([facility]) CRRT Replacement Intake (mL) 300 mL Hemodialysis Access 01/11/21 Placement Date: 01/11/21   Access Location: Internal jugular, right [employer] Being Utilized Yes Criteria for Appropriate Use Dialysis/apheresis Date Accessed  01/15/21 Site Assessment Clean, dry, & intact Date of Last Dressing Change 01/15/21 Dressing Status Clean, dry, & intact Dressing Type Disk with Chlorhexadine gluconate (CHG); Transparent Proximal Hub Color/Line Status Yellow; Flushed; Infusing Distal Hub Color/Line Status Yellow; Flushed; Infusing  
$$ Dialysis Charges  
$$ Method CRRT  
CRRT Dialysis Intake/Output Patient Location [place]

## 2021-01-15 NOTE — PROGRESS NOTES
Comprehensive Nutrition Assessment Type and Reason for Visit: Arron Werner Nutrition Recommendations/Plan:  
? Continue current TF regimen. No changes warranted today. Malnutrition Assessment: 
Malnutrition Status: Insufficient data Nutrition Assessment:  
Nutrition History: 12/31: 3 recorded meals in past 6 days with average intake of 83% Nutrition Background: H/O: CAD, HTN, DM, HLD, peripheral neuropathy, CKD stage III. Presented with dyspnea. Findings of +COVID. Admitted to ICU with ARDS, severe acute hypoxic due to COVID PNA, PRATIMA on CKD. Was requiring BIPAP at night and Aviro during day. Had brief cardiac arrest and was intubated 12/30 Daily Update: 
Pt remains intubated and sedated. OGT in place with TF infusing @ goal rate. Verified with RN. Plans for trach next week per review of notes. SLED today. Edema: 2+ to BUEs, 1+ to BLEs per nursing documentation. Abdominal Status (last documented): Soft, Intact abdomen with Active  bowel sounds. Last BM 01/15/21. Pertinent Medications: 6 mg decadron q day, pepcid, SSI (very insulin resistant), lokelma (not being given) Drips/IVF: versed, fentanyl, levophed @ 2 mcg/min currently Pertinent Labs: K+ 5.2, Phos 5.5, Glucose 151 mg/dl POC glucose 159,150,164,146 mg/dl Nutrition Related Findings:  
NFPE deferred d/t isolation. Intubated and OGT placed 12/30. CRRT initiated on 1/12. TF formula changed to a renal formula on 1/11. Current Nutrition Therapies: 
Current Tube Feeding (TF) Orders: · Feeding Route: Orogastric · Formula: Nepro · Schedule:Continuous · Regimen: 35 ml/hr · Additives/Modulars: Protein(3 packets prosource tube feeding q day) · Water Flushes: 65 ml q 4 hours · Current TF & Flush Orders Provides: @ goal 
· Goal TF & Flush Orders Provides: 1632 kcal (100% kcal needs), 101 gm PRO (100% estimated protein needs), 1059 ml fluid/day (100% of fluid needs) Current Intake: Average Meal Intake: NPO Average Supplement Intake: NPO Anthropometric Measures: 
Height: 5' 5\" (165.1 cm) Current Body Wt: 82.9 kg (182 lb 12.2 oz)(1/14), Weight source: Not specified BMI: 30.4, Obese class 1 (BMI 30.0-34. 9) Admission Body Weight: 159 lb 2.8 oz(Bedscale) Ideal Body Wt: 125 lbs (57 kg), 123.8 % Usual Body Wt: 73.5 kg (162 lb)(Per EMR 11/5/82020), Percent weight change: -4.5 Estimated Daily Nutrient Needs: 
Energy (kcal/day): 5853-2930 (Kcal/kg(25-30), Weight Used: Ideal(56.8)) Protein (g/day): (1.7-2.5gm/kg-- CRRT) Weight Used: (Ideal) Fluid (ml/day):   (Standard renal) Nutrition Diagnosis:  
· Inadequate oral intake related to impaired respiratory function as evidenced by (intubated, NPO, TF to meet needs) · Increased nutrient needs related to renal dysfunction as evidenced by (CRRT) Nutrition Interventions:  
Food and/or Nutrient Delivery: Continue NPO, Continue tube feeding Coordination of Nutrition Care: Continue to monitor while inpatient Plan of Care discussed with Kelli Loomis RN. Goals:  
Previous Goal Met: Goal(s) achieved Active Goal: Continue to meet >75% of estimated needs via TF. Nutrition Monitoring and Evaluation:  
  
Food/Nutrient Intake Outcomes: Enteral nutrition intake/tolerance Physical Signs/Symptoms Outcomes: Biochemical data, GI status, Hemodynamic status Discharge Planning: Too soon to determine Esha Evans [address] [place] [address] [facility] [address] Disaster Mode active

## 2021-01-15 NOTE — PROGRESS NOTES
Critical Care Daily Progress Note: 1/15/2021 Admission Date: 12/24/2020 The patient's chart is reviewed and the patient is discussed with the staff. Admission Date: 12/24/2020     Length of Stay: 21 days 
  
Background: 78 y.o. y/o female with acute hypoxemic respiratory failure secondary to COVID-19. Onset of COVID-19 symptoms: 12/24  
 
24hr events: Intermittently agitated. Was on dopa yesterday for bradycardia but now off. Currently on SLED with marginal BP.  
  
Notable PMH: DM, CAD, HTN, CKD 
  
 
ROS: intubated, sedated Lines: (insertion date) Quad Lumen 12/30/20 Anterior; Left Internal jugular Orogastric Tube 12/30/20 Urinary Catheter 01/05/21 [place] Airway - Endotracheal Tube 12/30/20 Oral 
Wound Wrist Left;Dorsal 12/31/20 Wound Ankle Anterior 
  
Drips: current dose (range) Versed   10 mg/hr (0-10) Fentanyl  200 mcg/hr (0-300) Levophed just started due to hypotension on SLED Subjective:  
Ventilator Settings Mode FIO2 Rate Tidal Volume PEEP I:E Ratio  
VC+, Assist control  70 %   32 400 ml  12 cm H20  1:1.35   
  
Peak airway pressure: 39 cm H2O Minute ventilation: 13.7 l/min  
8 hours of sled yesterday [id number] Current Facility-Administered Medications Medication Dose Route Frequency  DOPamine (INTROPIN) 800 mg in dextrose 5% 250 mL infusion  0-20 mcg/kg/min IntraVENous TITRATE  docusate (COLACE) 50 mg/5 mL oral liquid 100 mg  100 mg Per NG tube DAILY PRN  
 heparin (porcine) 1,000 unit/mL injection 5,000 Units  5,000 Units IntraVENous DIALYSIS PRN  
 insulin regular (NOVOLIN R, HUMULIN R) injection   SubCUTAneous Q4H  
 dexamethasone (DECADRON) 10 mg/mL injection 6 mg  6 mg IntraVENous Q24H  
 alcohol 62% (NOZIN) nasal  1 Ampule  1 Ampule Topical Q12H  
 [Held by provider] furosemide (LASIX) injection 20 mg  20 mg IntraVENous Q12H  
 NOREPINephrine (LEVOPHED) 4 mg in 5% dextrose 250 mL infusion  0.5-30 mcg/min IntraVENous TITRATE  0.9% sodium chloride infusion 250 mL  250 mL IntraVENous PRN  
 0.9% sodium chloride infusion 250 mL  250 mL IntraVENous PRN  
 heparin (porcine) injection 5,000 Units  5,000 Units SubCUTAneous Q8H  
 midazolam in normal saline (VERSED) 1 mg/mL infusion  0-10 mg/hr IntraVENous TITRATE  fentaNYL in normal saline (pf) 25 mcg/mL infusion  0-200 mcg/hr IntraVENous TITRATE  sodium chloride 3% hypertonic nebulizer soln  4 mL Nebulization BID  dexmedeTOMidine (PRECEDEX) 400 mcg in 0.9% sodium chloride 100 mL infusion  0.1-1.5 mcg/kg/hr IntraVENous TITRATE  fentaNYL citrate (PF) injection 50 mcg  50 mcg IntraVENous Q2H PRN  
 bisacodyL (DULCOLAX) suppository 10 mg  10 mg Rectal DAILY PRN  
 [Held by provider] sodium zirconium cyclosilicate (LOKELMA) powder packet 5 g  5 g Oral DAILY  NUTRITIONAL SUPPORT ELECTROLYTE PRN ORDERS   Does Not Apply PRN  
 [Held by provider] hydrALAZINE (APRESOLINE) tablet 10 mg  10 mg Per NG tube TID  famotidine (PF) (PEPCID) 20 mg in 0.9% sodium chloride 10 mL injection  20 mg IntraVENous DAILY  dextrose 40% (GLUTOSE) oral gel 1 Tube  15 g Oral PRN  
 glucagon (GLUCAGEN) injection 1 mg  1 mg IntraMUSCular PRN  
 dextrose (D50W) injection syrg 12.5-25 g  25-50 mL IntraVENous PRN  
 albuterol (PROVENTIL HFA, VENTOLIN HFA, PROAIR HFA) inhaler 2 Puff  2 Puff Inhalation Q4H PRN  
 hydrALAZINE (APRESOLINE) 20 mg/mL injection 20 mg  20 mg IntraVENous Q2H PRN  phenol throat spray (CHLORASEPTIC) 1 Spray  1 Spray Oral PRN  
 lip protectant (BLISTEX) ointment 1 Each  1 Each Topical PRN  
 acetaminophen (TYLENOL) tablet 650 mg  650 mg Oral Q6H PRN  
 0.9% sodium chloride infusion 250 mL  250 mL IntraVENous PRN  
 sodium chloride (NS) flush 5-40 mL  5-40 mL IntraVENous Q8H  
 sodium chloride (NS) flush 5-40 mL  5-40 mL IntraVENous PRN  
 albuterol (PROVENTIL VENTOLIN) nebulizer solution 2.5 mg  2.5 mg Nebulization Q6H RT  
 
 
Review of Systems Unobtainable due to patient status. Objective:  
 
Vitals:  
 01/15/21 0947 01/15/21 1000 01/15/21 1015 01/15/21 1027 BP: (!) 105/53 (!) 118/52 119/62 Pulse: 97 (!) 105 (!) 109 96 Resp:  (!) 35 15 (!) 32 Temp:      
SpO2:  93% (!) 89% 94% Weight:      
Height:      
 
 
 
Intake/Output Summary (Last 24 hours) at 1/15/2021 1031 Last data filed at 1/15/2021 7610 Gross per 24 hour Intake 3612.83 ml Output 445 ml Net 3167.83 ml Physical Exam:         
Constitutional:  intubated and mechanically ventilated. EENMT:  Sclera clear, pupils equal, oral mucosa moist 
Respiratory: scattered crackles Cardiovascular:  RRR with no M,G,R; 
Gastrointestinal:  soft with no tenderness; positive bowel sounds present Musculoskeletal:  warm with no cyanosis, no lower extremity edema Skin:  no jaundice or ecchymosis Neurologic: no gross neuro deficits Psychiatric:  Sedated but moves around some CXR:   
 
 
 
Ventilator Settings Mode FIO2 Rate Tidal Volume Pressure PEEP  
VC+  50 %    400 ml  12 cm H2O  12 cm H20 Peak airway pressure: 37 cm H2O Minute ventilation: 14.5 l/min ABG:  
Recent Labs  
  01/15/21 
0356 01/14/21 
0311 01/13/21 
1211 PHI 7.38 7.34* 7.36  
PCO2I 49.7* 57.9* 49.4* PO2I 78 55* 72* HCO3I 29.0* 30.9* 27.9*  
 
 
LAB Recent Labs  
  01/15/21 
0710 01/15/21 
0340 01/15/21 
0326 01/14/21 
2312 01/14/21 2001 GLUCPOC 164* 150* 159* 205* 205* Recent Labs  
  01/15/21 
0342 01/14/21 
0341 01/13/21 
0330 WBC 11.4* 11.5* 11.3* HGB 8.6* 8.8* 8.0*  
HCT 27.2* 27.7* 25.3*  
 171 200 Recent Labs  
  01/15/21 
0342 01/14/21 
0341 01/13/21 
0330  139 140  
K 5.2* 4.4 4.7  104 105 CO2 30 31 31 * 162* 69  
BUN 57* 31* 63* CREA 2.29* 1.54* 2.43* MG 2.3  --  2.3 PHOS 5.5*  --  4.6*  
CA 8.7 8.4 8.5 No results for input(s): LCAD, LAC in the last 72 hours. Patient Active Problem List  
Diagnosis Code  Diabetes mellitus with hyperosmolarity without hyperglycemic hyperosmolar nonketotic coma ([facility] [address] E11.00  Coronary artery disease involving coronary bypass graft of native heart without angina pectoris I25.810  
 Uncontrolled type 2 diabetes mellitus with hyperglycemia (HCC) E11.65  
 Essential hypertension I10  
 HLD (hyperlipidemia) E78.5  Chronic kidney disease, stage III (moderate) N18.30  
 Osteopenia M85.80  Vitamin D deficiency E55.9  Gastroesophageal reflux disease without esophagitis K21.9  Other allergic rhinitis J30.89  Peripheral neuropathy G62.9  
 Primary osteoarthritis involving multiple joints M89.49  
 Insomnia G47.00  RLS (restless legs syndrome) G25.81  
 Acute renal failure (ARF) ([facility]) N17.9  Elevated liver function tests R79.89  
 Anemia D64.9  Right lower quadrant abdominal pain R10.31  
 Gram-negative bacteremia R78.81  
 Acute pancreatitis K85.90  Generalized weakness R53.1  Decreased oral intake R63.8  Acute on chronic renal failure (HCC) N17.9, N18.9  Dehydration E86.0  Diabetic ketoacidosis ([facility]) E11.10  Hypothermia T68. Florencio Nahid  Metabolic acidosis V82.6  Noncompliance Z91.19  
 Hypoalbuminemia due to protein-calorie malnutrition ([facility] [address] E88.09, E46  
 COVID-19 U07.1  Acute respiratory distress syndrome (ARDS) due to severe acute respiratory syndrome coronavirus 2 (SARS-CoV-2) ([facility]) U07.1, J80  Acute hypoxemic respiratory failure ([facility]) J96.01  
 Acute cystitis without hematuria N30.00  Cardiac arrest ([facility] I46.9 Assessment:  (Medical Decision Making) Hospital Problems  Date Reviewed: 8/10/2019 Codes Class Noted POA Cardiac arrest [facility]) ICD-10-CM: I46.9 ICD-9-CM: 427.5  12/30/2020 Unknown Mentation has not substantially improved. Acute cystitis without hematuria ICD-10-CM: N30.00 ICD-9-CM: 595.0  12/29/2020 Unknown COVID-19 ICD-10-CM: U07.1 ICD-9-CM: 079.89  12/24/2020 Unknown  Severe  
 * (Principal) Acute respiratory distress syndrome (ARDS) due to severe acute respiratory syndrome coronavirus 2 (SARS-CoV-2) (HCC) ICD-10-CM: U07.1, J80 
ICD-9-CM: 518.82, 079.89  12/24/2020 Unknown  
 Ongoing  
 Acute hypoxemic respiratory failure (HCC) ICD-10-CM: J96.01 
ICD-9-CM: 518.81  12/24/2020 Unknown  
 Ongoing. Trach planned for next week.  
 Acute on chronic renal failure (HCC) ICD-10-CM: N17.9, N18.9 
ICD-9-CM: 584.9, 585.9  9/17/2019 Unknown  
 On SLED  
 Coronary artery disease involving coronary bypass graft of native heart without angina pectoris (Chronic) ICD-10-CM: I25.810 
ICD-9-CM: 414.05  6/15/2015 Yes  
   
 Uncontrolled type 2 diabetes mellitus with hyperglycemia (HCC) (Chronic) ICD-10-CM: E11.65 
ICD-9-CM: 250.02  6/15/2015 Yes  
   
 Diabetes mellitus with hyperosmolarity without hyperglycemic hyperosmolar nonketotic coma (HCC) (Chronic) ICD-10-CM: E11.00 
ICD-9-CM: 250.20  11/24/2014 Yes  
   
  
 
 
Plan:  (Medical Decision Making)  
1  NEURO:  
1. Sedation: Versed gtt 
2. Analgesia: Fentanyl gtt 
CV:  
1. Volume Status:  RRT 
2. Hypotension: back on pressors with dialysis 
PULM:  
1. Acute hypoxemic/hypercapneic respiratory failure:  Titrate Min Vent to pH/CO2.   Day # 17 on ventilator. Trach next week, having to go up on O2, co2 worse also 
2. Severe ARDS 2/2 COVID: Low Tidal Volume ventilation, goal 6cc per kg (Ideal body weight: 57 kg (125 lb 10.6 oz) x 6 = 348ml). Attempt to limit driving pressure to 18cm/H2O or less. Allow permissive hypercapnia/acidosis to pH 7.25 if needed to achieve above. Improved oxygenation, but still stiff with high peak pressures on vent.  
RENAL: 
1. PRATIMA: on SLED 
GI: NPO, PPI prophy 
1. Nutrition: continue TF 
HEME: no acute issues 
1. Thrombocytopenia: resolved.  HIT negative 
2. Anticoagulation: d-dimer to determine anticoagulation ppx for VTE. 
ID: WBC elevated, afebrile.  
1. COVID-19: COVID meds as above. Restart Dexa course 1/8-> 
 2. MRSA Pneumonia: Completed vanc ENDO: 
1. DM: hold lantus 30U daily with hypoglycemia this AM. 
Skin: no decub, turns, preventive care TJEJSJ: [id number] 
-- 
DNR, called daughter Clifford Eid [phone]) today Critical care time 32 minutes More than 50% of the time documented was spent in face-to-face contact with the patient and in the care of the patient on the floor/unit where the patient is located.  
 
Ruel Moss MD

## 2021-01-15 NOTE — CONSULTS
Chart reviewed. Pt originally tested positive for COVID on 12/24/20. Replication competent virus has not been described after 20 days of illness in immunocompetent patients, and the patient has no immunosuppressing conditions that would be of concern for prolonged viral replication. Therefore, ID recommendation is to remove from isolation. no JVD

## 2021-01-15 NOTE — PROGRESS NOTES
Ventilator check complete; patient has a #7.5 ET tube secured at the 23 at the lip. Patient is sedated. Patient is not able to follow commands. Breath sounds are coarse and diminished. Trachea is midline, Negative for subcutaneous air, and chest excursion is symmetric. Patient is also Negative for cyanosis and is Negative for pitting edema. All alarms are set and audible. Resuscitation bag is at the head of the bed. Ventilator Settings Mode FIO2 Rate Tidal Volume PEEP I:E Ratio VC+  50 %(weaned)   32 bpm 400 ml  12 cm H20  1:1.35 Peak airway pressure: 39 cm H2O Minute ventilation: 13.1 l/min Xu Shoulders, RT

## 2021-01-15 NOTE — PROGRESS NOTES
Ventilator check complete; patient has a #7.5 ET tube secured at the 22 at the lip. Patient is sedated. Patient is not able to follow commands. Breath sounds are coarse and diminished. Trachea is midline, Negative for subcutaneous air, and chest excursion is symmetric. Patient is also Negative for cyanosis and is Positive for pitting edema. All alarms are set and audible. Resuscitation bag is at the head of the bed. Ventilator Settings Mode FIO2 Rate Tidal Volume Pressure PEEP I:E Ratio VC+  60 %    400 ml  12 cm H2O  12 cm H20  1:1.35 Peak airway pressure: 18 cm H2O Minute ventilation: 17.2 l/min Ryan Aguilera, RT

## 2021-01-16 NOTE — PROGRESS NOTES
Critical Care Daily Progress Note: 1/16/2021 Admission Date: 12/24/2020 The patient's chart is reviewed and the patient is discussed with the staff. Admission Date: 12/24/2020     Length of Stay: 21 days 
  
Background: 78 y.o. y/o female with acute hypoxemic respiratory failure secondary to COVID-19. Onset of COVID-19 symptoms: 12/24  
 
24hr events: Intermittently agitated. Off pressors. SLED later today again.  
  
Notable PMH: DM, CAD, HTN, CKD 
  
ROS: intubated, sedated Lines: (insertion date) Quad Lumen 12/30/20 Anterior; Left Internal jugular Orogastric Tube 12/30/20 Urinary Catheter 01/05/21 [place] Airway - Endotracheal Tube 12/30/20 Oral 
Wound Wrist Left;Dorsal 12/31/20 Wound Ankle Anterior 
  
Drips: current dose (range) 
  fentaNYL in normal saline (pf) 25 mcg/mL infusion    
  0-200 mcg/hr, IV, TITRATE    
  Last Action:  Rate Change, 125 mcg/hr at 01/16 0919    
  midazolam in normal saline (VERSED) 1 mg/mL infusion    
  0-10 mg/hr, IV, TITRATE    
  Last Action:  Rate Change, 4 mg/hr at 01/16 0919    
 
Subjective:  
Ventilator Settings Mode FIO2 Rate Tidal Volume PEEP I:E Ratio  
VC+, Assist control  70 %   32 400 ml  12 cm H20  1:1.35   
  
Peak airway pressure: 39 cm H2O Minute ventilation: 13.7 l/min  
8 hours of sled yesterday [id number]//[id number] Current Facility-Administered Medications Medication Dose Route Frequency  DOPamine (INTROPIN) 800 mg in dextrose 5% 250 mL infusion  0-20 mcg/kg/min IntraVENous TITRATE  docusate (COLACE) 50 mg/5 mL oral liquid 100 mg  100 mg Per NG tube DAILY PRN  
 heparin (porcine) 1,000 unit/mL injection 5,000 Units  5,000 Units IntraVENous DIALYSIS PRN  
 insulin regular (NOVOLIN R, HUMULIN R) injection   SubCUTAneous Q4H  
 dexamethasone (DECADRON) 10 mg/mL injection 6 mg  6 mg IntraVENous Q24H  
 alcohol 62% (NOZIN) nasal  1 Ampule  1 Ampule Topical Q12H  [Held by provider] furosemide (LASIX) injection 20 mg  20 mg IntraVENous Q12H  
 NOREPINephrine (LEVOPHED) 4 mg in 5% dextrose 250 mL infusion  0.5-30 mcg/min IntraVENous TITRATE  
 0.9% sodium chloride infusion 250 mL  250 mL IntraVENous PRN  
 0.9% sodium chloride infusion 250 mL  250 mL IntraVENous PRN  
 heparin (porcine) injection 5,000 Units  5,000 Units SubCUTAneous Q8H  
 midazolam in normal saline (VERSED) 1 mg/mL infusion  0-10 mg/hr IntraVENous TITRATE  fentaNYL in normal saline (pf) 25 mcg/mL infusion  0-200 mcg/hr IntraVENous TITRATE  sodium chloride 3% hypertonic nebulizer soln  4 mL Nebulization BID  dexmedeTOMidine (PRECEDEX) 400 mcg in 0.9% sodium chloride 100 mL infusion  0.1-1.5 mcg/kg/hr IntraVENous TITRATE  fentaNYL citrate (PF) injection 50 mcg  50 mcg IntraVENous Q2H PRN  
 bisacodyL (DULCOLAX) suppository 10 mg  10 mg Rectal DAILY PRN  
 [Held by provider] sodium zirconium cyclosilicate (LOKELMA) powder packet 5 g  5 g Oral DAILY  NUTRITIONAL SUPPORT ELECTROLYTE PRN ORDERS   Does Not Apply PRN  
 [Held by provider] hydrALAZINE (APRESOLINE) tablet 10 mg  10 mg Per NG tube TID  famotidine (PF) (PEPCID) 20 mg in 0.9% sodium chloride 10 mL injection  20 mg IntraVENous DAILY  dextrose 40% (GLUTOSE) oral gel 1 Tube  15 g Oral PRN  
 glucagon (GLUCAGEN) injection 1 mg  1 mg IntraMUSCular PRN  
 dextrose (D50W) injection syrg 12.5-25 g  25-50 mL IntraVENous PRN  
 albuterol (PROVENTIL HFA, VENTOLIN HFA, PROAIR HFA) inhaler 2 Puff  2 Puff Inhalation Q4H PRN  
 hydrALAZINE (APRESOLINE) 20 mg/mL injection 20 mg  20 mg IntraVENous Q2H PRN  phenol throat spray (CHLORASEPTIC) 1 Spray  1 Spray Oral PRN  
 lip protectant (BLISTEX) ointment 1 Each  1 Each Topical PRN  
 acetaminophen (TYLENOL) tablet 650 mg  650 mg Oral Q6H PRN  
 0.9% sodium chloride infusion 250 mL  250 mL IntraVENous PRN  
 sodium chloride (NS) flush 5-40 mL  5-40 mL IntraVENous Q8H  
  sodium chloride (NS) flush 5-40 mL  5-40 mL IntraVENous PRN  
 albuterol (PROVENTIL VENTOLIN) nebulizer solution 2.5 mg  2.5 mg Nebulization Q6H RT  
 
Review of Systems Unobtainable due to patient status. Objective:  
 
Vitals:  
 01/16/21 1216 01/16/21 1231 01/16/21 1247 01/16/21 1301 BP: (!) 116/53 (!) 129/58 (!) 146/67 (!) 149/70 Pulse: 65 84 83 72 Resp: (!) 32 (!) 32 (!) 32 (!) 32 Temp:      
SpO2: 97% 94% 97% 97% Weight:      
Height:      
 
 
Intake/Output Summary (Last 24 hours) at 1/16/2021 1346 Last data filed at 1/16/2021 1147 Gross per 24 hour Intake 670.53 ml Output 1720 ml Net -1049.47 ml Physical Exam:         
Constitutional:  intubated and mechanically ventilated. EENMT:  Sclera clear, pupils equal, oral mucosa moist 
Respiratory: scattered crackles Cardiovascular:  RRR with no M,G,R; 
Gastrointestinal:  soft with no tenderness; positive bowel sounds present Musculoskeletal:  warm with no cyanosis, no lower extremity edema Skin:  no jaundice or ecchymosis Neurologic: no gross neuro deficits Psychiatric:  Sedated but moves around some CXR:  1/15: stable Ventilator Settings Mode FIO2 Rate Tidal Volume Pressure PEEP  
VC+  50 %    400 ml  12 cm H2O  12 cm H20 Peak airway pressure: 39 cm H2O Minute ventilation: 12.2 l/min ABG:  
Recent Labs  
  01/16/21 
0206 01/15/21 
0356 01/14/21 
7976 PHI 7.39 7.38 7.34* PCO2I 47.8* 49.7* 57.9*  
PO2I 83 78 55* HCO3I 28.6* 29.0* 30.9* LAB Recent Labs  
  01/16/21 
1154 01/16/21 
0753 01/16/21 
0691 01/15/21 
2258 01/15/21 
2021 GLUCPOC 225* 288* 221* 244* 297* Recent Labs  
  01/16/21 
0325 01/15/21 
0342 01/14/21 
0341 WBC 11.2* 11.4* 11.5* HGB 8.1* 8.6* 8.8* HCT 25.5* 27.2* 27.7*  
* 177 171 Recent Labs  
  01/16/21 
0325 01/15/21 
0342 01/14/21 
0341  140 139  
K 4.5 5.2* 4.4  
 104 104 CO2 32 30 31 * 151* 162* BUN 36* 57* 31*  
 CREA 1.73* 2.29* 1.54* MG  --  2.3  --   
PHOS  --  5.5*  --   
CA 8.5 8.7 8.4 No results for input(s): LCAD, LAC in the last 72 hours. Patient Active Problem List  
Diagnosis Code  Diabetes mellitus with hyperosmolarity without hyperglycemic hyperosmolar nonketotic coma ([facility] E11.00  Coronary artery disease involving coronary bypass graft of native heart without angina pectoris I25.810  
 Uncontrolled type 2 diabetes mellitus with hyperglycemia (HCC) E11.65  
 Essential hypertension I10  
 HLD (hyperlipidemia) E78.5  Chronic kidney disease, stage III (moderate) N18.30  
 Osteopenia M85.80  Vitamin D deficiency E55.9  Gastroesophageal reflux disease without esophagitis K21.9  Other allergic rhinitis J30.89  Peripheral neuropathy G62.9  
 Primary osteoarthritis involving multiple joints M89.49  
 Insomnia G47.00  RLS (restless legs syndrome) G25.81  
 Acute renal failure (ARF) ([facility]) N17.9  Elevated liver function tests R79.89  
 Anemia D64.9  Right lower quadrant abdominal pain R10.31  
 Gram-negative bacteremia R78.81  
 Acute pancreatitis K85.90  Generalized weakness R53.1  Decreased oral intake R63.8  Acute on chronic renal failure (HCC) N17.9, N18.9  Dehydration E86.0  Diabetic ketoacidosis ([facility]) E11.10  Hypothermia T68. Sherel Sebastian  Metabolic acidosis N44.8  Noncompliance Z91.19  
 Hypoalbuminemia due to protein-calorie malnutrition ([facility] E88.09, E46  
 COVID-19 U07.1  Acute respiratory distress syndrome (ARDS) due to severe acute respiratory syndrome coronavirus 2 (SARS-CoV-2) ([facility]) U07.1, J80  Acute hypoxemic respiratory failure ([facility]) J96.01  
 Acute cystitis without hematuria N30.00  Cardiac arrest ([facility] I46.9 Assessment:  (Medical Decision Making) Hospital Problems  Date Reviewed: 8/10/2019 Codes Class Noted POA Cardiac arrest [facility]) ICD-10-CM: I46.9 ICD-9-CM: 427.5  12/30/2020 Unknown Mentation has not substantially improved. Acute cystitis without hematuria ICD-10-CM: N30.00 ICD-9-CM: 595.0  12/29/2020 Unknown COVID-19 ICD-10-CM: U07.1 ICD-9-CM: 079.89  12/24/2020 Unknown Severe * (Principal) Acute respiratory distress syndrome (ARDS) due to severe acute respiratory syndrome coronavirus 2 (SARS-CoV-2) ([facility]) ICD-10-CM: U07.1, J80 
ICD-9-CM: 518.82, 079.89  12/24/2020 Unknown Ongoing Acute hypoxemic respiratory failure ([facility] ICD-10-CM: J96.01 
ICD-9-CM: 518.81  12/24/2020 Unknown Ongoing. Trach planned for next week. Acute on chronic renal failure (HCC) ICD-10-CM: N17.9, N18.9 ICD-9-CM: 584.9, 585.9  9/17/2019 Unknown On SLED Coronary artery disease involving coronary bypass graft of native heart without angina pectoris (Chronic) ICD-10-CM: A72.952 ICD-9-CM: 414.05  6/15/2015 Yes Uncontrolled type 2 diabetes mellitus with hyperglycemia (HCC) (Chronic) ICD-10-CM: E11.65 ICD-9-CM: 250.02  6/15/2015 Yes Diabetes mellitus with hyperosmolarity without hyperglycemic hyperosmolar nonketotic coma (HCC) (Chronic) ICD-10-CM: E11.00 ICD-9-CM: 250.20  11/24/2014 Yes Plan:  (Medical Decision Making) 1  NEURO:  
1. Sedation: Versed gtt, try precedex to see if can wake up more 2. Analgesia: Fentanyl gtt CV: 1. Volume Status:  RRT 2. Hypotension: back on pressors with dialysis PULM:  
1. Acute hypoxemic/hypercapneic respiratory failure:  Titrate Min Vent to pH/CO2.   Day # 17 on ventilator. Trach next week, having to go up on O2, co2 worse also 2. Severe ARDS 2/2 COVID: Low Tidal Volume ventilation, goal 6cc per kg (Ideal body weight: 57 kg (125 lb 10.6 oz) x 6 = 348ml). Attempt to limit driving pressure to 05JQ/C5Q or less. Allow permissive hypercapnia/acidosis to pH 7.25 if needed to achieve above. Improved oxygenation, but still stiff with high peak pressures on vent. No progress this week. Planned for tracheostomy Tuesday.  
RENAL: 
 1. PRATIMA: on SLED 
GI: NPO, PPI prophy 1. Nutrition: continue TF 
HEME: no acute issues 1. Thrombocytopenia: resolved.  HIT negative 2. Anticoagulation: d-dimer to determine anticoagulation ppx for VTE. ID: WBC elevated, afebrile. 1. COVID-19: COVID meds as above. Restart Dexa course 1/8-> 
2. MRSA Pneumonia: Completed vanc ENDO: 
1. DM: hold lantus 30U daily with hypoglycemia this AM. 
Skin: no decub, turns, preventive care Prophy: Heparin/H2B 
-- 
DNR, Maya Luu updated by nurse Critical care time 32 minutes More than 50% of the time documented was spent in face-to-face contact with the patient and in the care of the patient on the floor/unit where the patient is located.  
 
Hank Hutchins MD

## 2021-01-16 NOTE — PROGRESS NOTES
Bedside and Verbal shift change report given to Vishal Joshua RN (oncoming nurse) by Romi Lloyd RN (offgoing nurse). Report included the following information SBAR, Kardex, Intake/Output, MAR, Recent Results, Cardiac Rhythm SB and Alarm Parameters . Fentanyl and versed gtts verified with offgoing RN.

## 2021-01-16 NOTE — PROGRESS NOTES
8 hour CRRT started with R temp IJ cath, lines aspirates and flushes well. UF rate 300ml/hr Heparin 3000 units bolus and 500 units/hr infusion started Pt intubated on vent, /67, HR 96 
 
Report given to FAY Bradford 
 
 
 01/16/21 1544 Patient Information Informed Consent Verified Yes First Use Xray Location Verified Yes Dialyzer/Set Up Inspection Dialyzer/Set Up Inspection F-6 Alarms Verified Yes Test Pass Yes  
pH 7.1 Machine Conductivity 14.2 Meter Conductivity 14.1 Reverse Osmosis Safety Checks Reverse Osmosis Machine Log Completed Yes (RO #1) Total Chlorine Test Negative Machine Initiation Machine Number k5 Procedure Start Time 0414 Unused Lines Clamped Yes Machine Temperature 95 °F (35 °C) Dialysis Initiation All Connections Secured Yes NS Bag  Yes Saline Line Double Clamped Yes Prime Given Air Foam Detector Engaged Yes Dialysate NA (mEq/L) 140 Dialysate K (mEq/L) 4 Dialysate CA (mEq/L) 2.5 Dialysate HCO3 (mEq/L) 35 Citrasate No  
During Dialysis Pulse (Heart Rate) 96  
BP (!) 150/67 Transducer Checks Dry Saline Given (mL) 300 mL Heparin Bolus (units) 3000 units Continuous Heparin Infusion (Units/hr) 500 Units/hr Blood Flow Rate (ml/min) 150 ml/min Dialysate Flow Rate (ml/hr) 300 ml/hr Arterial Access Pressure (mmHg) 90 Venous Return Pressure (mmHg) 40 Transmembrane Pressure (mmHg) 90 mmHg Hourly Chamber Checks Yes Ultrafiltration Rate (ml/hr) 300 ml/hr CRRT Dialysis Intake/Output ([facility]) CRRT Replacement Intake (mL) 300 mL  
UF Effluent Output (RMVD) Dialysis (mL) 0 mL Net Fluid Balance (mL) 300 Hemodialysis Access 01/11/21 Placement Date: 01/11/21   Access Location: Internal jugular, right [employer] Being Utilized Yes Criteria for Appropriate Use Dialysis/apheresis Date Accessed  01/16/21 Access Time  1547 Site Assessment Clean, dry, & intact Date of Last Dressing Change 01/15/21 Dressing Status Clean, dry, & intact Dressing Type Disk with Chlorhexadine gluconate (CHG) Proximal Hub Color/Line Status Yellow; Infusing;Flushed Distal Hub Color/Line Status Yellow; Infusing;Flushed  
$$ Dialysis Charges  
$$ Method CRRT 
(8 hours) CRRT Dialysis Intake/Output Patient Location [place]

## 2021-01-16 NOTE — PROGRESS NOTES
Spoke to pt daughter, Zita Smith, about pt status. Will continue with dialysis again today. Daughter is realistic about situation and pt status. Pt remains a DNR.

## 2021-01-16 NOTE — PROGRESS NOTES
[facility] PROGRESS NOTE Follow up for: PRATIMA Subjective:  
 
 
ROS: 
Unable to obtain Objective:  
Exam: 
Vitals:  
 01/16/21 0916 01/16/21 0932 01/16/21 0947 01/16/21 1001 BP: (!) 83/48 (!) 131/56 (!) 140/70 (!) 142/65 Pulse: (!) 51 (!) 54 (!) 57 70 Resp: (!) 32 (!) 32 (!) 32 (!) 32 Temp:      
SpO2: 96% 99% 100% 97% Weight:      
Height:      
 
PE: 
General: intubated on Vent. Lung: rhonchi bilaterally CV; Regular rate, S1, S2, no Rub Abd: soft, no rebound Ext: + edema Access: right temp line-intact Intake/Output Summary (Last 24 hours) at 1/16/2021 1029 Last data filed at 1/16/2021 0300 Gross per 24 hour Intake 670.53 ml Output 2489 ml Net -1818.47 ml  
 
 
Current Facility-Administered Medications Medication Dose Route Frequency  DOPamine (INTROPIN) 800 mg in dextrose 5% 250 mL infusion  0-20 mcg/kg/min IntraVENous TITRATE  docusate (COLACE) 50 mg/5 mL oral liquid 100 mg  100 mg Per NG tube DAILY PRN  
 heparin (porcine) 1,000 unit/mL injection 5,000 Units  5,000 Units IntraVENous DIALYSIS PRN  
 insulin regular (NOVOLIN R, HUMULIN R) injection   SubCUTAneous Q4H  
 dexamethasone (DECADRON) 10 mg/mL injection 6 mg  6 mg IntraVENous Q24H  
 alcohol 62% (NOZIN) nasal  1 Ampule  1 Ampule Topical Q12H  
 [Held by provider] furosemide (LASIX) injection 20 mg  20 mg IntraVENous Q12H  
 NOREPINephrine (LEVOPHED) 4 mg in 5% dextrose 250 mL infusion  0.5-30 mcg/min IntraVENous TITRATE  
 0.9% sodium chloride infusion 250 mL  250 mL IntraVENous PRN  
 0.9% sodium chloride infusion 250 mL  250 mL IntraVENous PRN  
 heparin (porcine) injection 5,000 Units  5,000 Units SubCUTAneous Q8H  
 midazolam in normal saline (VERSED) 1 mg/mL infusion  0-10 mg/hr IntraVENous TITRATE  fentaNYL in normal saline (pf) 25 mcg/mL infusion  0-200 mcg/hr IntraVENous TITRATE  sodium chloride 3% hypertonic nebulizer soln  4 mL Nebulization BID  
  dexmedeTOMidine (PRECEDEX) 400 mcg in 0.9% sodium chloride 100 mL infusion  0.1-1.5 mcg/kg/hr IntraVENous TITRATE  fentaNYL citrate (PF) injection 50 mcg  50 mcg IntraVENous Q2H PRN  
 bisacodyL (DULCOLAX) suppository 10 mg  10 mg Rectal DAILY PRN  
 [Held by provider] sodium zirconium cyclosilicate (LOKELMA) powder packet 5 g  5 g Oral DAILY  NUTRITIONAL SUPPORT ELECTROLYTE PRN ORDERS   Does Not Apply PRN  
 [Held by provider] hydrALAZINE (APRESOLINE) tablet 10 mg  10 mg Per NG tube TID  famotidine (PF) (PEPCID) 20 mg in 0.9% sodium chloride 10 mL injection  20 mg IntraVENous DAILY  dextrose 40% (GLUTOSE) oral gel 1 Tube  15 g Oral PRN  
 glucagon (GLUCAGEN) injection 1 mg  1 mg IntraMUSCular PRN  
 dextrose (D50W) injection syrg 12.5-25 g  25-50 mL IntraVENous PRN  
 albuterol (PROVENTIL HFA, VENTOLIN HFA, PROAIR HFA) inhaler 2 Puff  2 Puff Inhalation Q4H PRN  
 hydrALAZINE (APRESOLINE) 20 mg/mL injection 20 mg  20 mg IntraVENous Q2H PRN  phenol throat spray (CHLORASEPTIC) 1 Spray  1 Spray Oral PRN  
 lip protectant (BLISTEX) ointment 1 Each  1 Each Topical PRN  
 acetaminophen (TYLENOL) tablet 650 mg  650 mg Oral Q6H PRN  
 0.9% sodium chloride infusion 250 mL  250 mL IntraVENous PRN  
 sodium chloride (NS) flush 5-40 mL  5-40 mL IntraVENous Q8H  
 sodium chloride (NS) flush 5-40 mL  5-40 mL IntraVENous PRN  
 albuterol (PROVENTIL VENTOLIN) nebulizer solution 2.5 mg  2.5 mg Nebulization Q6H RT  
 
 
Physical EXAM MD did not go into room GEN - intubated, sedated on vent CV - Regular rate, S1, S2, no Rub Lung - coarse bilaterally Abd -  Soft, non tender, + BS Ext - 1+ BLE edema Recent Labs  
  01/16/21 
0325 01/15/21 
0342 01/14/21 
0341 WBC 11.2* 11.4* 11.5* HGB 8.1* 8.6* 8.8* HCT 25.5* 27.2* 27.7*  
* 177 171 Recent Labs  
  01/16/21 
0325 01/15/21 
0342 01/14/21 
0341  140 139  
K 4.5 5.2* 4.4  
 104 104 CO2 32 30 31 BUN 36* 57* 31*  
 CREA 1.73* 2.29* 1.54* CA 8.5 8.7 8.4 * 151* 162* MG  --  2.3  --   
PHOS  --  5.5*  --   
 
 
Assessment and Plan: 1. PRATIMA over CKD Possibly COVID ATN , SED today for volume and clearance 2. Hyperkalemia ok  
  
3. COVID 19 +/acute respiratory failure/ARDs ventilated per pulm 4. Anemia stable Kevin Colorado MD

## 2021-01-16 NOTE — PROGRESS NOTES
Ventilator check complete; patient has a #7.5 ET tube secured at the 22 at the lip. .  Breath sounds are diminished. Trachea is midline, Negative for subcutaneous air, and chest excursion is symmetric. Patient is also Negative for cyanosis and is Positive for pitting edema. All alarms are set and audible. Resuscitation bag is at the head of the bed. Ventilator Settings Mode FIO2 Rate Tidal Volume Pressure PEEP I:E Ratio VC+  50 %(Weaned. SAT 98%)    400 ml  12 cm H2O  12 cm H20  1:1.3 Peak airway pressure: 39 cm H2O Minute ventilation: 12.2 l/min ABG: No results for input(s): PH, PCO2, PO2, HCO3 in the last 72 hours.  
 
 
Edwin Coates, RT

## 2021-01-17 NOTE — PROGRESS NOTES
Critical Care Daily Progress Note: 1/17/2021 Admission Date: 12/24/2020 The patient's chart is reviewed and the patient is discussed with the staff. Admission Date: 12/24/2020     Length of Stay: 21 days 
  
Background: 78 y.o. y/o female with acute hypoxemic respiratory failure secondary to COVID-19. Onset of COVID-19 symptoms: 12/24  
 
24hr events: Intermittently agitated. Off pressors. SLED the past few days, on hold today.  
  
Notable PMH: DM, CAD, HTN, CKD 
  
ROS: intubated, sedated Lines: (insertion date) Quad Lumen 12/30/20 Anterior; Left Internal jugular Orogastric Tube 12/30/20 Urinary Catheter 01/05/21 [place] Airway - Endotracheal Tube 12/30/20 Oral 
Wound Wrist Left;Dorsal 12/31/20 Wound Ankle Anterior 
  
Drips: current dose (range) 
  fentaNYL in normal saline (pf) 25 mcg/mL infusion    
  0-200 mcg/hr, IV, TITRATE    
  Last Action:  Rate Change, 125 mcg/hr at 01/16 0919    
  midazolam in normal saline (VERSED) 1 mg/mL infusion    
  0-10 mg/hr, IV, TITRATE    
  Last Action:  Rate Change, 4 mg/hr at 01/16 0919    
 
Subjective:  
Ventilator Settings Mode FIO2 Rate Tidal Volume PEEP I:E Ratio  
VC+, Assist control  70 %   32 400 ml  12 cm H20  1:1.35   
  
Peak airway pressure: 39 cm H2O Minute ventilation: 13.7 l/min  
8 hours of sled yesterday [id number] Current Facility-Administered Medications Medication Dose Route Frequency  DOPamine (INTROPIN) 800 mg in dextrose 5% 250 mL infusion  0-20 mcg/kg/min IntraVENous TITRATE  docusate (COLACE) 50 mg/5 mL oral liquid 100 mg  100 mg Per NG tube DAILY PRN  
 heparin (porcine) 1,000 unit/mL injection 5,000 Units  5,000 Units IntraVENous DIALYSIS PRN  
 insulin regular (NOVOLIN R, HUMULIN R) injection   SubCUTAneous Q4H  
 dexamethasone (DECADRON) 10 mg/mL injection 6 mg  6 mg IntraVENous Q24H  
 alcohol 62% (NOZIN) nasal  1 Ampule  1 Ampule Topical Q12H  [Held by provider] furosemide (LASIX) injection 20 mg  20 mg IntraVENous Q12H  
 NOREPINephrine (LEVOPHED) 4 mg in 5% dextrose 250 mL infusion  0.5-30 mcg/min IntraVENous TITRATE  
 0.9% sodium chloride infusion 250 mL  250 mL IntraVENous PRN  
 0.9% sodium chloride infusion 250 mL  250 mL IntraVENous PRN  
 heparin (porcine) injection 5,000 Units  5,000 Units SubCUTAneous Q8H  
 midazolam in normal saline (VERSED) 1 mg/mL infusion  0-10 mg/hr IntraVENous TITRATE  fentaNYL in normal saline (pf) 25 mcg/mL infusion  0-200 mcg/hr IntraVENous TITRATE  sodium chloride 3% hypertonic nebulizer soln  4 mL Nebulization BID  dexmedeTOMidine (PRECEDEX) 400 mcg in 0.9% sodium chloride 100 mL infusion  0.1-1.5 mcg/kg/hr IntraVENous TITRATE  fentaNYL citrate (PF) injection 50 mcg  50 mcg IntraVENous Q2H PRN  
 bisacodyL (DULCOLAX) suppository 10 mg  10 mg Rectal DAILY PRN  
 [Held by provider] sodium zirconium cyclosilicate (LOKELMA) powder packet 5 g  5 g Oral DAILY  NUTRITIONAL SUPPORT ELECTROLYTE PRN ORDERS   Does Not Apply PRN  
 [Held by provider] hydrALAZINE (APRESOLINE) tablet 10 mg  10 mg Per NG tube TID  famotidine (PF) (PEPCID) 20 mg in 0.9% sodium chloride 10 mL injection  20 mg IntraVENous DAILY  dextrose 40% (GLUTOSE) oral gel 1 Tube  15 g Oral PRN  
 glucagon (GLUCAGEN) injection 1 mg  1 mg IntraMUSCular PRN  
 dextrose (D50W) injection syrg 12.5-25 g  25-50 mL IntraVENous PRN  
 albuterol (PROVENTIL HFA, VENTOLIN HFA, PROAIR HFA) inhaler 2 Puff  2 Puff Inhalation Q4H PRN  
 hydrALAZINE (APRESOLINE) 20 mg/mL injection 20 mg  20 mg IntraVENous Q2H PRN  phenol throat spray (CHLORASEPTIC) 1 Spray  1 Spray Oral PRN  
 lip protectant (BLISTEX) ointment 1 Each  1 Each Topical PRN  
 acetaminophen (TYLENOL) tablet 650 mg  650 mg Oral Q6H PRN  
 0.9% sodium chloride infusion 250 mL  250 mL IntraVENous PRN  
 sodium chloride (NS) flush 5-40 mL  5-40 mL IntraVENous Q8H  
  sodium chloride (NS) flush 5-40 mL  5-40 mL IntraVENous PRN  
 albuterol (PROVENTIL VENTOLIN) nebulizer solution 2.5 mg  2.5 mg Nebulization Q6H RT  
 
Review of Systems Unobtainable due to patient status. Objective:  
 
Vitals:  
 01/17/21 1215 01/17/21 1230 01/17/21 1245 01/17/21 1300 BP: (!) 169/90 (!) 165/85 (!) 120/58 120/63 Pulse: 80 79 (!) 51 (!) 48 Resp: (!) 37 (!) 38 (!) 31 30 Temp:      
SpO2: 95% 96% 96% 95% Weight:      
Height:      
 
 
Intake/Output Summary (Last 24 hours) at 1/17/2021 1404 Last data filed at 1/17/2021 1100 Gross per 24 hour Intake 2080.15 ml Output 2460 ml Net -379.85 ml Physical Exam:         
Constitutional:  intubated and mechanically ventilated. EENMT:  Sclera clear, pupils equal, oral mucosa moist 
Respiratory: scattered crackles Cardiovascular:  RRR with no M,G,R; 
Gastrointestinal:  soft with no tenderness; positive bowel sounds present Musculoskeletal:  warm with no cyanosis, no lower extremity edema Skin:  no jaundice or ecchymosis Neurologic: no gross neuro deficits Psychiatric:  Sedated but moves around some CXR:  1/15: stable Ventilator Settings Mode FIO2 Rate Tidal Volume Pressure PEEP  
VC+  40 %    400 ml  12 cm H2O  12 cm H20 Peak airway pressure: 38 cm H2O Minute ventilation: 13.4 l/min ABG:  
Recent Labs  
  01/17/21 
0352 01/16/21 
0206 01/15/21 
0356 PHI 7.50* 7.39 7.38  
PCO2I 39.7 47.8* 49.7* PO2I 88 83 78 HCO3I 30.8* 28.6* 29.0*  
 
LAB Recent Labs  
  01/17/21 
1227 01/17/21 
0843 01/17/21 
0346 01/16/21 
2346 01/16/21 1958 GLUCPOC 223* 213* 146* 159* 167* Recent Labs  
  01/17/21 
0326 01/16/21 
0325 01/15/21 
0342 WBC 9.1 11.2* 11.4* HGB 8.4* 8.1* 8.6* HCT 26.2* 25.5* 27.2*  
 146* 177 Recent Labs  
  01/17/21 
0326 01/16/21 
0325 01/15/21 
0342  139 140  
K 4.3 4.5 5.2*  
 104 104 CO2 34* 32 30 * 212* 151* BUN 22 36* 57*  
 CREA 1.27* 1.73* 2.29* MG  --   --  2.3 PHOS  --   --  5.5*  
CA 8.7 8.5 8.7 No results for input(s): LCAD, LAC in the last 72 hours. Patient Active Problem List  
Diagnosis Code  Diabetes mellitus with hyperosmolarity without hyperglycemic hyperosmolar nonketotic coma ([facility] E11.00  Coronary artery disease involving coronary bypass graft of native heart without angina pectoris I25.810  
 Uncontrolled type 2 diabetes mellitus with hyperglycemia (HCC) E11.65  
 Essential hypertension I10  
 HLD (hyperlipidemia) E78.5  Chronic kidney disease, stage III (moderate) N18.30  
 Osteopenia M85.80  Vitamin D deficiency E55.9  Gastroesophageal reflux disease without esophagitis K21.9  Other allergic rhinitis J30.89  Peripheral neuropathy G62.9  
 Primary osteoarthritis involving multiple joints M89.49  
 Insomnia G47.00  RLS (restless legs syndrome) G25.81  
 Acute renal failure (ARF) ([facility]) N17.9  Elevated liver function tests R79.89  
 Anemia D64.9  Right lower quadrant abdominal pain R10.31  
 Gram-negative bacteremia R78.81  
 Acute pancreatitis K85.90  Generalized weakness R53.1  Decreased oral intake R63.8  Acute on chronic renal failure (HCC) N17.9, N18.9  Dehydration E86.0  Diabetic ketoacidosis ([facility]) E11.10  Hypothermia T68. Cody Sarna  Metabolic acidosis Z79.5  Noncompliance Z91.19  
 Hypoalbuminemia due to protein-calorie malnutrition ([facility] E88.09, E46  
 COVID-19 U07.1  Acute respiratory distress syndrome (ARDS) due to severe acute respiratory syndrome coronavirus 2 (SARS-CoV-2) ([facility]) U07.1, J80  Acute hypoxemic respiratory failure ([facility]) J96.01  
 Acute cystitis without hematuria N30.00  Cardiac arrest ([facility] I46.9 Assessment:  (Medical Decision Making) Hospital Problems  Date Reviewed: 8/10/2019 Codes Class Noted POA Cardiac arrest [facility]) ICD-10-CM: I46.9 ICD-9-CM: 427.5  12/30/2020 Unknown Mentation has not substantially improved. Acute cystitis without hematuria ICD-10-CM: N30.00 ICD-9-CM: 595.0  12/29/2020 Unknown COVID-19 ICD-10-CM: U07.1 ICD-9-CM: 079.89  12/24/2020 Unknown Severe * (Principal) Acute respiratory distress syndrome (ARDS) due to severe acute respiratory syndrome coronavirus 2 (SARS-CoV-2) ([facility]) ICD-10-CM: U07.1, J80 
ICD-9-CM: 518.82, 079.89  12/24/2020 Unknown Ongoing Acute hypoxemic respiratory failure ([facility] ICD-10-CM: J96.01 
ICD-9-CM: 518.81  12/24/2020 Unknown Ongoing. Trach planned for next week. Acute on chronic renal failure (HCC) ICD-10-CM: N17.9, N18.9 ICD-9-CM: 584.9, 585.9  9/17/2019 Unknown On SLED Coronary artery disease involving coronary bypass graft of native heart without angina pectoris (Chronic) ICD-10-CM: U86.781 ICD-9-CM: 414.05  6/15/2015 Yes Uncontrolled type 2 diabetes mellitus with hyperglycemia (HCC) (Chronic) ICD-10-CM: E11.65 ICD-9-CM: 250.02  6/15/2015 Yes Diabetes mellitus with hyperosmolarity without hyperglycemic hyperosmolar nonketotic coma (HCC) (Chronic) ICD-10-CM: E11.00 ICD-9-CM: 250.20  11/24/2014 Yes Plan:  (Medical Decision Making) 1  NEURO:  
1. Sedation: Precedex, try to wake up more off versed. 2. Analgesia: Fentanyl gtt CV: 1. Volume Status:  RRT 2. Hypotension: back on pressors with dialysis PULM:  
1. Acute hypoxemic/hypercapneic respiratory failure:  Titrate Min Vent to pH/CO2.   Day # 17 on ventilator. Trach next week, having to go up on O2, co2 worse also 2. Severe ARDS 2/2 COVID: Low Tidal Volume ventilation, goal 6cc per kg (Ideal body weight: 57 kg (125 lb 10.6 oz) x 6 = 348ml). Attempt to limit driving pressure to 44IQ/B5L or less. Allow permissive hypercapnia/acidosis to pH 7.25 if needed to achieve above. Improved oxygenation, but still stiff with high peak pressures on vent. No progress this week. Planned for tracheostomy Tuesday.  
RENAL: 
 1. PRATIMA: on SLED, tolerating reasonably well. GI: NPO, PPI prophy 1. Nutrition: continue TF 
HEME: no acute issues 1. Thrombocytopenia: resolved.  HIT negative 2. Anticoagulation: d-dimer to determine anticoagulation ppx for VTE. ID: WBC elevated, afebrile. 1. COVID-19: COVID meds as above. Restart Dexa course 1/8-> 
2. MRSA Pneumonia: Completed vanc ENDO: 
1. DM: hold lantus 30U daily with hypoglycemia this AM. 
Skin: no decub, turns, preventive care Prophy: Heparin/H2B 
-- 
DNR, Vivian Lopez updated by nurse Critical care time 32 minutes More than 50% of the time documented was spent in face-to-face contact with the patient and in the care of the patient on the floor/unit where the patient is located.  
 
Glenda Kate MD

## 2021-01-17 NOTE — PROGRESS NOTES
[facility] PROGRESS NOTE Follow up for: PRATIMA Subjective:  
 
 
ROS: 
Unable to obtain Objective:  
Exam: 
Vitals:  
 01/17/21 0945 01/17/21 1000 01/17/21 1014 01/17/21 1015 BP: (!) 162/91 (!) 117/58 (!) 117/58 Pulse: 98 66 66 (!) 58 Resp: (!) 37 27 27 30 Temp:   97.6 °F (36.4 °C) SpO2: (!) 88% 91%  92% Weight:      
Height:      
 
PE: 
General: intubated on Vent. Lung: rhonchi bilaterally CV; Regular rate, S1, S2, no Rub Abd: soft, no rebound Ext: + edema Access: right temp line-intact Intake/Output Summary (Last 24 hours) at 1/17/2021 1140 Last data filed at 1/17/2021 5085 Gross per 24 hour Intake 2080.15 ml Output 2435 ml Net -354.85 ml Current Facility-Administered Medications Medication Dose Route Frequency  DOPamine (INTROPIN) 800 mg in dextrose 5% 250 mL infusion  0-20 mcg/kg/min IntraVENous TITRATE  docusate (COLACE) 50 mg/5 mL oral liquid 100 mg  100 mg Per NG tube DAILY PRN  
 heparin (porcine) 1,000 unit/mL injection 5,000 Units  5,000 Units IntraVENous DIALYSIS PRN  
 insulin regular (NOVOLIN R, HUMULIN R) injection   SubCUTAneous Q4H  
 dexamethasone (DECADRON) 10 mg/mL injection 6 mg  6 mg IntraVENous Q24H  
 alcohol 62% (NOZIN) nasal  1 Ampule  1 Ampule Topical Q12H  
 [Held by provider] furosemide (LASIX) injection 20 mg  20 mg IntraVENous Q12H  
 NOREPINephrine (LEVOPHED) 4 mg in 5% dextrose 250 mL infusion  0.5-30 mcg/min IntraVENous TITRATE  
 0.9% sodium chloride infusion 250 mL  250 mL IntraVENous PRN  
 0.9% sodium chloride infusion 250 mL  250 mL IntraVENous PRN  
 heparin (porcine) injection 5,000 Units  5,000 Units SubCUTAneous Q8H  
 midazolam in normal saline (VERSED) 1 mg/mL infusion  0-10 mg/hr IntraVENous TITRATE  fentaNYL in normal saline (pf) 25 mcg/mL infusion  0-200 mcg/hr IntraVENous TITRATE  sodium chloride 3% hypertonic nebulizer soln  4 mL Nebulization BID  
  dexmedeTOMidine (PRECEDEX) 400 mcg in 0.9% sodium chloride 100 mL infusion  0.1-1.5 mcg/kg/hr IntraVENous TITRATE  fentaNYL citrate (PF) injection 50 mcg  50 mcg IntraVENous Q2H PRN  
 bisacodyL (DULCOLAX) suppository 10 mg  10 mg Rectal DAILY PRN  
 [Held by provider] sodium zirconium cyclosilicate (LOKELMA) powder packet 5 g  5 g Oral DAILY  NUTRITIONAL SUPPORT ELECTROLYTE PRN ORDERS   Does Not Apply PRN  
 [Held by provider] hydrALAZINE (APRESOLINE) tablet 10 mg  10 mg Per NG tube TID  famotidine (PF) (PEPCID) 20 mg in 0.9% sodium chloride 10 mL injection  20 mg IntraVENous DAILY  dextrose 40% (GLUTOSE) oral gel 1 Tube  15 g Oral PRN  
 glucagon (GLUCAGEN) injection 1 mg  1 mg IntraMUSCular PRN  
 dextrose (D50W) injection syrg 12.5-25 g  25-50 mL IntraVENous PRN  
 albuterol (PROVENTIL HFA, VENTOLIN HFA, PROAIR HFA) inhaler 2 Puff  2 Puff Inhalation Q4H PRN  
 hydrALAZINE (APRESOLINE) 20 mg/mL injection 20 mg  20 mg IntraVENous Q2H PRN  phenol throat spray (CHLORASEPTIC) 1 Spray  1 Spray Oral PRN  
 lip protectant (BLISTEX) ointment 1 Each  1 Each Topical PRN  
 acetaminophen (TYLENOL) tablet 650 mg  650 mg Oral Q6H PRN  
 0.9% sodium chloride infusion 250 mL  250 mL IntraVENous PRN  
 sodium chloride (NS) flush 5-40 mL  5-40 mL IntraVENous Q8H  
 sodium chloride (NS) flush 5-40 mL  5-40 mL IntraVENous PRN  
 albuterol (PROVENTIL VENTOLIN) nebulizer solution 2.5 mg  2.5 mg Nebulization Q6H RT  
 
 
Physical EXAM MD did not go into room GEN - intubated, sedated on vent CV - Regular rate, S1, S2, no Rub Lung - coarse bilaterally Abd -  Soft, non tender, + BS Ext - 1+ BLE edema Recent Labs  
  01/17/21 
0326 01/16/21 
0325 01/15/21 
0342 WBC 9.1 11.2* 11.4* HGB 8.4* 8.1* 8.6* HCT 26.2* 25.5* 27.2*  
 146* 177 Recent Labs  
  01/17/21 
0326 01/16/21 
0325 01/15/21 
0342  139 140  
K 4.3 4.5 5.2*  
 104 104 CO2 34* 32 30 BUN 22 36* 57*  
 CREA 1.27* 1.73* 2.29* CA 8.7 8.5 8.7 * 212* 151* MG  --   --  2.3 PHOS  --   --  5.5* Assessment and Plan: 1. PRATIMA on CKD, oliguric Possibly COVID ATN , SED tomorrow for volume and clearance 2. Hyperkalemia ok  
  
3. COVID 19 +/acute respiratory failure/ARDs ventilated per pulm 4. Anemia stable Jodee Gomez MD

## 2021-01-17 NOTE — PROGRESS NOTES
Ventilator check complete; patient has a #7.5 ET tube secured at the 22 at the lip. Patient is not able to follow commands. Breath sounds are coarse. Trachea is midline, Negative for subcutaneous air, and chest excursion is symmetric. Patient is also Negative for cyanosis and is Negative for pitting edema. All alarms are set and audible. Resuscitation bag is at the head of the bed. Ventilator Settings Mode FIO2 Rate Tidal Volume Pressure PEEP I:E Ratio VC+  40 %   32 400 ml    12 cm H20  1:1.3 Peak airway pressure: 38 cm H2O Minute ventilation: 13.4 l/min ABG: No results for input(s): PH, PCO2, PO2, HCO3 in the last 72 hours.  
 
 
Yohan Galvez, RT

## 2021-01-18 NOTE — PROGRESS NOTES
Chart reviewed by [facility] for discharge planning. Ot remains in the BMT/ICU  
02 demand Vent 40% Fi02 
PPD placed on 12/28 PT and OT will need to evaluate once pt is medically stable Pt is receiving SLED Possible trach this week, LTACH vs STRH? Will continue to follow for discharge planning needs Please consult  if any new issues arise

## 2021-01-18 NOTE — PROGRESS NOTES
Dr. Omkar Crum notified of pt RR= 41 for the last hour despite meds and interventions. Orders to restart Fentanyl gtt.

## 2021-01-18 NOTE — DIALYSIS
8 HR SLED initiated using  Right IJ catheter. Aspirated and flushed both catheter ports without problem. Machine settings per MD order. 150  DFR  300 UFR Heparin 3000 unit bolus and 500 ml/hr. Reported to primary nurse. Dialysis nurse available as needed.

## 2021-01-18 NOTE — PROGRESS NOTES
Pt OGT clogged during residual check. Attempts to clear clog unsuccessful. OG withdrawn 7 cm in attempt to clear clog and was unsuccessful. OGT to be removed tomorrow during tracheostomy, MD okay with removal today and placement of NGT.

## 2021-01-18 NOTE — PROGRESS NOTES
[facility] PROGRESS NOTE Follow up for: PRATIMA Subjective:  
Patient seen and examined in [facility] ICU on SLED via rigth temp line 150 Qb,  ml/hr, tolerating SLED, remains intubated on vent 40 %, 12 PEEP. Labs and chart reviewed-  ICU RN  
 
ROS: 
Unable to obtain Objective:  
Exam: 
Vitals:  
 01/18/21 1248 01/18/21 1303 01/18/21 1318 01/18/21 1332 BP: (!) 120/58 (!) 148/66 (!) 146/63 (!) 152/74 Pulse: 68 77 86 77 Resp: (!) 35 (!) 40 (!) 43 (!) 38 Temp:      
SpO2: 94% 96% 93% 96% Weight:      
Height:      
 
PE: 
General: intubated on Vent. Lung: rhonchi bilaterally CV; Regular rate, S1, S2, no Rub Abd: soft, non tender, + BS Ext: trace edema Access: right temp line-intact Intake/Output Summary (Last 24 hours) at 1/18/2021 1345 Last data filed at 1/18/2021 1245 Gross per 24 hour Intake 2307.46 ml Output 2016 ml Net 291.46 ml  
 
 
Current Facility-Administered Medications Medication Dose Route Frequency  [START ON 1/19/2021] dexamethasone (DECADRON) 4 mg/mL injection 4 mg  4 mg IntraVENous Q24H  
 insulin glargine (LANTUS) injection 8 Units  8 Units SubCUTAneous QHS  DOPamine (INTROPIN) 800 mg in dextrose 5% 250 mL infusion  0-20 mcg/kg/min IntraVENous TITRATE  docusate (COLACE) 50 mg/5 mL oral liquid 100 mg  100 mg Per NG tube DAILY PRN  
 heparin (porcine) 1,000 unit/mL injection 5,000 Units  5,000 Units IntraVENous DIALYSIS PRN  
 insulin regular (NOVOLIN R, HUMULIN R) injection   SubCUTAneous Q4H  
 alcohol 62% (NOZIN) nasal  1 Ampule  1 Ampule Topical Q12H  
 NOREPINephrine (LEVOPHED) 4 mg in 5% dextrose 250 mL infusion  0.5-30 mcg/min IntraVENous TITRATE  
 0.9% sodium chloride infusion 250 mL  250 mL IntraVENous PRN  
 heparin (porcine) injection 5,000 Units  5,000 Units SubCUTAneous Q8H  
 midazolam in normal saline (VERSED) 1 mg/mL infusion  0-10 mg/hr IntraVENous TITRATE  fentaNYL in normal saline (pf) 25 mcg/mL infusion  0-200 mcg/hr IntraVENous TITRATE  sodium chloride 3% hypertonic nebulizer soln  4 mL Nebulization BID  dexmedeTOMidine (PRECEDEX) 400 mcg in 0.9% sodium chloride 100 mL infusion  0.1-1.5 mcg/kg/hr IntraVENous TITRATE  fentaNYL citrate (PF) injection 50 mcg  50 mcg IntraVENous Q2H PRN  
 bisacodyL (DULCOLAX) suppository 10 mg  10 mg Rectal DAILY PRN  
 [Held by provider] sodium zirconium cyclosilicate (LOKELMA) powder packet 5 g  5 g Oral DAILY  NUTRITIONAL SUPPORT ELECTROLYTE PRN ORDERS   Does Not Apply PRN  
 [Held by provider] hydrALAZINE (APRESOLINE) tablet 10 mg  10 mg Per NG tube TID  famotidine (PF) (PEPCID) 20 mg in 0.9% sodium chloride 10 mL injection  20 mg IntraVENous DAILY  dextrose 40% (GLUTOSE) oral gel 1 Tube  15 g Oral PRN  
 glucagon (GLUCAGEN) injection 1 mg  1 mg IntraMUSCular PRN  
 dextrose (D50W) injection syrg 12.5-25 g  25-50 mL IntraVENous PRN  
 albuterol (PROVENTIL HFA, VENTOLIN HFA, PROAIR HFA) inhaler 2 Puff  2 Puff Inhalation Q4H PRN  
 hydrALAZINE (APRESOLINE) 20 mg/mL injection 20 mg  20 mg IntraVENous Q2H PRN  phenol throat spray (CHLORASEPTIC) 1 Spray  1 Spray Oral PRN  
 lip protectant (BLISTEX) ointment 1 Each  1 Each Topical PRN  
 acetaminophen (TYLENOL) tablet 650 mg  650 mg Oral Q6H PRN  
 sodium chloride (NS) flush 5-40 mL  5-40 mL IntraVENous Q8H  
 sodium chloride (NS) flush 5-40 mL  5-40 mL IntraVENous PRN  
 albuterol (PROVENTIL VENTOLIN) nebulizer solution 2.5 mg  2.5 mg Nebulization Q6H RT  
 
 
Physical EXAM MD did not go into room GEN - intubated, sedated on vent CV - Regular rate, S1, S2, no Rub Lung - coarse bilaterally Abd -  Soft, non tender, + BS Ext - 1+ BLE edema Recent Labs  
  01/18/21 
0306 01/17/21 
0326 01/16/21 
0325 WBC 10.4 9.1 11.2* HGB 8.7* 8.4* 8.1* HCT 27.4* 26.2* 25.5*  
 158 146* Recent Labs  
  01/18/21 
0306 01/17/21 
0326 01/16/21 0325  
 139 139  
K 4.5 4.3 4.5  
 105 104 CO2 30 34* 32 BUN 50* 22 36* CREA 2.38* 1.27* 1.73* CA 8.9 8.7 8.5 * 144* 212* MG 2.4  --   --   
PHOS 3.6  --   --   
 
 
Assessment and Plan: 1. PRATIMA on CKD, oliguric COVID ATN , no sign of renal recovery, non oliguric via coyle, seen on SLED, tolerating dialysis. 2. Hyperkalemia stable  
  
3. COVID 19 +/acute respiratory failure/ARDs ventilated per pulm 4. Anemia stable Ana Pimentel, CHER

## 2021-01-18 NOTE — PROGRESS NOTES
Bedside shift change report given to Mk Moon (oncoming nurse) by Erasmo Montelongo RN (offgoing nurse). Report included the following information SBAR, Kardex, Intake/Output, MAR, Recent Results and Cardiac Rhythm SB. Pt in bed sedated and ventilated. Awakens to voice, some response to commands. Lung fields coarse bilaterally. S1S2 auscultated, bradycardic rate. Bowel sounds active in all quadrants. Pulses palpable in all extremities, edema noted.

## 2021-01-18 NOTE — PROGRESS NOTES
Ventilator check complete; patient has a #7.5 ET tube secured at the 22 at the lip. Patient is sedated. Patient is not able to follow commands. Breath sounds are coarse, crackles and diminished. Trachea is midline, Negative for subcutaneous air, and chest excursion is symmetric. Patient is also Negative for cyanosis and is Negative for pitting edema. All alarms are set and audible. Resuscitation bag is at the head of the bed. Ventilator Settings Mode FIO2 Rate Tidal Volume Pressure PEEP I:E Ratio VC+  40 %  30  400 ml    12 cm H20  1:1.5 Peak airway pressure: 32 cm H2O Minute ventilation: 12.4 l/min ABG: No results for input(s): PH, PCO2, PO2, HCO3 in the last 72 hours.  
 
 
Imer Schuster, RT

## 2021-01-18 NOTE — PROGRESS NOTES
[facility] COVID-19 Note: 
 
Critical Care Note: 1/18/2021 Roxy Damon Admission Date: 12/24/2020     Length of Stay: 25 days Background: 78 y.o. y/o female with acute hypoxemic respiratory failure secondary to COVID-19. Onset of COVID-19 symptoms: 12/24 Notable PMH: DM, CAD, HTN, CKD 
 
24 Hour events: P:F improved to 220. Fluid balance 1.3L positive yesterday ROS: intubated, sedated Lines: (insertion date) Quad Lumen 12/30/20 Anterior; Left Internal jugular Orogastric Tube 12/30/20 Urinary Catheter 01/05/21 Clifton Airway - Endotracheal Tube 12/30/20 Oral 
Wound Wrist Left;Dorsal 12/31/20 Wound Ankle Anterior Drips: current dose (range) Fentanyl 150mcg Levophed 2mcg Visit Vitals BP (!) 146/66 Pulse 81 Temp (!) 96.1 °F (35.6 °C) Resp 27 Ht 5' 5\" (1.651 m) Wt 178 lb 12.7 oz (81.1 kg) SpO2 93% BMI 29.75 kg/m² Pertinent Exam:           
Constitutional:  intubated and mechanically ventilated. EENMT:  Sclera clear, pupils equal, oral mucosa moist 
Respiratory: coarse with rhonchi 
Cardiovascular:  RRR Gastrointestinal:  soft with no tenderness; positive bowel sounds present Musculoskeletal:  warm with no cyanosis, no lower extremity edema Skin:  no jaundice or ecchymosis Neurologic: minimally responsive Psychiatric: sedated CXR Pertinent Labs:  
Recent Labs  
  01/18/21 
0306 01/17/21 
0326 01/16/21 
0325 WBC 10.4 9.1 11.2* HGB 8.7* 8.4* 8.1* HCT 27.4* 26.2* 25.5*  
 158 146* Recent Labs  
  01/18/21 
0306 01/17/21 
0326 01/16/21 
0325  139 139  
K 4.5 4.3 4.5  
 105 104 CO2 30 34* 32  
* 144* 212* BUN 50* 22 36* CREA 2.38* 1.27* 1.73* MG 2.4  --   --   
CA 8.9 8.7 8.5 PHOS 3.6  --   --   
 
Recent Labs  
  01/18/21 
1142 01/18/21 
0823 01/18/21 
0403 GLUCPOC 117* 118* 225* MICRO Date Source Result 1/5 blood NG  
1/5 blood NG  
1/5 sputum Heavy MRSA 1/5 urine 11K c. glabrata COVID-19 Meds: 
Dexamethasone 6mg daily (1/8-1/18) Anti-infectives (start date): 
None current Ventilator Settings:  5' 5\" (1.651 m) Mode FIO2 Rate Tidal Volume Pressure PEEP  
VC+  40 %    400 ml  12 cm H2O  12 cm H20 Peak airway pressure: 40 cm H2O Minute ventilation: 12.5 l/min ABG:  
Recent Labs  
  01/17/21 
0352 01/16/21 
0206 PHI 7.50* 7.39 PCO2I 39.7 47.8*  
PO2I 88 83 HCO3I 30.8* 28.6*  
 
 
  
  
Plan:  (Medical Decision Making) 1  NEURO:  
1. Sedation: Needs sedation 2. Analgesia: wean off fentanyl drip and use prns CV: 1. Volume Status:  RRT 2. Hypotension: back on pressors with dialysis PULM:  
1. Acute hypoxemic/hypercapneic respiratory failure:  Titrate Min Vent to pH/CO2.   Day # 18 on ventilator. Trach next week, having to go up on O2. 
2. Severe ARDS 2/2 COVID: Low Tidal Volume ventilation, goal 6cc per kg (Ideal body weight: 57 kg (125 lb 10.6 oz) x 6 = 348ml). Attempt to limit driving pressure to 06NT/E5B or less. Allow permissive hypercapnia/acidosis to pH 7.25 if needed to achieve above. Improved oxygenation, but still stiff with high peak pressures on vent. No progress this week. Planned for tracheostomy Tuesday. RENAL: 
1. PRATIMA: on SLED, tolerating reasonably well. GI: NPO, PPI prophy 1. Nutrition: continue TF 
HEME: no acute issues 1. Thrombocytopenia: resolved.  HIT negative 2. Anticoagulation: d-dimer to determine anticoagulation ppx for VTE. ID: WBC elevated, afebrile. 1. COVID-19: COVID meds as above. taper off decadron 2. MRSA Pneumonia: Completed vanc ENDO: 
1. DM: decrease lantus as steroids tapered. Skin: no decub, turns, preventive care WPZKNY: [id number] 
-- 
DNR, Clarita Travis, [phone], was called but had to leave message. 
  
Critical care time 32 minutes More than 50% of the time documented was spent in face-to-face contact with the patient and in the care of the patient on the floor/unit where the patient is located. 
Brandee Hernandez MD

## 2021-01-19 NOTE — PERIOP NOTES
Called to assist with bedside percutaneous trach insertion.  Consent obtained.  Time out performed.  FIo2 increased to 100% for procedure.  Bronchoscope inserted into ET tube.  Propofol given per MD order by RN.  Vitals measured and recorded throughout procedure.  Trach procedure done with assistance of video scope in sterile fashion by MD.  Bronch done post procedure to evaluate airway by MD.  Trach secured size #6 XLT proximal Shiley cuffed with sutures and trach ties.  Pt tolerated procedure well with no adverse rxn.   Report and care to patient's RN

## 2021-01-19 NOTE — PROCEDURES
PROCEDURE: 
PERCUTANEOUS TRACHEOSTOMY (05958) 
 
01/19/21 
3:08 PM 
 
OPERATORS: 
Dr Corrine Rodriguez INDICATION: 
RESPIRATORY FAILURE AND FAILURE TO WEAN OFF VENTILATOR. EQUIPMENT: 
OLYMPUS Q 180 BRONCHOSCOPE. COOK RHINO PERCUTANEOUS TRACHEOSTOMY KI. 
PORTEX PERCUTANEOUS TRACHEOSTOMY KIT # 6 Prox XLT cuffed SHILEY ANESTHESIA: 
See ICU record SUMMARY OF PROCEDURE: 
After obtaining informed consent and adequate anesthesia, the anatomical landmarks were assessed and marked. The skin was prepped and draped with the provided chlorhexidine and drape in the usual fashion. Bronchoscopic guidance was provided throughout the procedure. 5 cc of 1% lidocaine with epinephrine was injected into the skin and subcutaneous tissue of the pretracheal skin. Using the provided scalpel 1.5 cm incision was made in the previously assessed area of the neck (2-3 intertracheal) ring space. After performing blunt dissection with provided asndra clamp and finger, the bronchoscope was positioned in the correct position for full real time view. An introducer needle was then placed into the trachea in mid tracheal line with tip seen in correct position on bronchoscopy. The needle was then tilted caudad and a guidewire was introduced in the usual fashion. After removing the needle a punch dilator was used to dilate the formed stoma, followed by the white introducer catheter. Further dilation # 24/28 dilator to pre marked sign. Following this a #6 cuffed prox XLT tracheostomy was placed without complication. The tracheostomy tube was then sutured in place with provided prolene suture. The ET tube was removed and a bronchoscopy performed through the newly established tracheostomy, confirming proper position of tracheostomy. EBL: 5 ml There were no immediate complications.   
 
Tom Tejeda MD

## 2021-01-19 NOTE — PROGRESS NOTES
Briefed by staff regarding patient/family needs. Chaplains remain available for patient/family care. Jacobo Wynn MDiv Board Certified [employer]

## 2021-01-19 NOTE — PROGRESS NOTES
Ventilator check complete; patient has a #7.5 ET tube secured at the 22 at the lip. Patient is sedated. Patient is not able to follow commands. Breath sounds are diminished. Trachea is midline, Negative for subcutaneous air, and chest excursion is symmetric. Patient is also Negative for cyanosis and is Negative for pitting edema. All alarms are set and audible. Resuscitation bag is at the head of the bed. Ventilator Settings Mode FIO2 Rate Tidal Volume Pressure PEEP I:E Ratio VC+  40 %   30 400 ml  12 cm H2O  12 cm H20  1:1.5 Peak airway pressure: 29 cm H2O Minute ventilation: 14 l/min [address], RT

## 2021-01-19 NOTE — PROGRESS NOTES
Ventilator check complete; patient has a #7.5 ET tube secured at the 22 at the lip. Patient is sedated. Patient is not able to follow commands. Breath sounds are coarse and diminished. Trachea is midline, Negative for subcutaneous air, and chest excursion is symmetric. Patient is also Negative for cyanosis and is Negative for pitting edema. All alarms are set and audible. Resuscitation bag is at the head of the bed. Ventilator Settings Mode FIO2 Rate Tidal Volume PEEP I:E Ratio VC+  40 %   30 bpm 400 ml  12 cm H20  1:1.5 Peak airway pressure: 25 cm H2O Minute ventilation: 14.8 l/min Didi Bobby, RT

## 2021-01-19 NOTE — PROGRESS NOTES
[facility] COVID-19 Note: 
 
Critical Care Note: 1/19/2021 Anish Mayers Admission Date: 12/24/2020     Length of Stay: 26 days Background: 78 y.o. y/o female with acute hypoxemic respiratory failure secondary to COVID-19. Onset of COVID-19 symptoms: 12/24 Notable PMH: DM, CAD, HTN, CKD 
 
24 Hour events: P:F improved to 182.5 Planned for trach today. Required continuous fentanyl and levophed yesterday. ROS: intubated, sedated Lines: (insertion date) Quad Lumen 12/30/20 Anterior; Left Internal jugular Orogastric Tube 12/30/20 Urinary Catheter 01/05/21 Clifton Airway - Endotracheal Tube 12/30/20 Oral 
Wound Wrist Left;Dorsal 12/31/20 Wound Ankle Anterior Drips: current dose (range) Fentanyl 175mcg 
precedex 1.4 Visit Vitals BP (!) 154/71 Pulse 80 Temp 98.5 °F (36.9 °C) Resp 20 Ht 5' 5\" (1.651 m) Wt 178 lb 12.7 oz (81.1 kg) SpO2 93% BMI 29.75 kg/m² Pertinent Exam:           
Constitutional:  intubated and mechanically ventilated. EENMT:  Sclera clear, pupils equal, oral mucosa moist 
Respiratory: coarse with rhonchi 
Cardiovascular:  RRR Gastrointestinal:  soft with no tenderness; positive bowel sounds present Musculoskeletal:  warm with no cyanosis, no lower extremity edema Skin:  no jaundice or ecchymosis Neurologic: minimally responsive Psychiatric: sedated CXR Pertinent Labs:  
Recent Labs  
  01/19/21 
0255 01/18/21 
0306 01/17/21 
0326 WBC 11.6* 10.4 9.1 HGB 8.4* 8.7* 8.4* HCT 27.0* 27.4* 26.2*  
 183 158 Recent Labs  
  01/19/21 
0255 01/18/21 
0306 01/17/21 
0326  139 139  
K 4.6 4.5 4.3  103 105 CO2 33* 30 34* GLU 96 220* 144* BUN 30* 50* 22 CREA 1.75* 2.38* 1.27* MG  --  2.4  --   
CA 8.8 8.9 8.7 PHOS  --  3.6  --   
 
Recent Labs  
  01/19/21 
0801 01/19/21 
0744 01/19/21 
0441 GLUCPOC 129* 65 91 MICRO Date Source Result 1/5 blood NG  
1/5 blood NG  
 1/5 sputum Heavy MRSA  
1/5 urine 11K c. glabrata COVID-19 Meds: 
Dexamethasone 6mg daily (1/8-1/18) Anti-infectives (start date): 
None current Ventilator Settings:  5' 5\" (1.651 m) Mode FIO2 Rate Tidal Volume Pressure PEEP  
VC+  40 %    400 ml  12 cm H2O  12 cm H20 Peak airway pressure: 29 cm H2O Minute ventilation: 14 l/min ABG:  
Recent Labs  
  01/19/21 
0448 01/17/21 
0352 PHI 7.43 7.50* PCO2I 46.9* 39.7 PO2I 73* 88  
HCO3I 31.3* 30.8* You know that tracheostomy is scheduled atAnd still be comfortable and sober working on at some more today Plan:  (Medical Decision Making) 1  NEURO:  
1. Sedation: use propofol and fentanyl 2. Analgesia: wean off fentanyl drip and use propofol. 3.  Delirium:  Use precedex, tenex, risperdal  
CV: 1. Volume Status:  RRT 2. Hypotension: back on pressors with dialysis PULM:  
1. Acute hypoxemic/hypercapneic respiratory failure:  Titrate Min Vent to pH/CO2.   Day # 18 on ventilator. Trach next week, having to go up on O2. 
2. Severe ARDS 2/2 COVID: Low Tidal Volume ventilation, goal 6cc per kg (Ideal body weight: 57 kg (125 lb 10.6 oz) x 6 = 348ml). Attempt to limit driving pressure to 83VU/D5J or less. Allow permissive hypercapnia/acidosis to pH 7.25 if needed to achieve above. Improved oxygenation, but still stiff with high peak pressures on vent. No progress this week. Planned for tracheostomy Tuesday. RENAL: 
1. PRATIMA: on SLED, tolerating reasonably well. GI: NPO, PPI prophy 1. Nutrition: continue TF 
HEME: no acute issues 1. Thrombocytopenia: resolved.  HIT negative 2. Anticoagulation: d-dimer to determine anticoagulation ppx for VTE. ID: WBC elevated, afebrile. 1. COVID-19: COVID meds as above. taper off decadron 2. MRSA Pneumonia: Completed vanco 
ENDO: 
1. DM: decrease lantus as steroids tapered. Skin: no decub, turns, preventive care FJEMJS: SWADYER/X1N 
-- 
DNR, Adelene Cap [phone], was updated. Appreciate infection control review and consideration of de-escalation of precautions. 
  
Critical care time 33 minutes More than 50% of the time documented was spent in face-to-face contact with the patient and in the care of the patient on the floor/unit where the patient is located. 
Anthony Connell MD

## 2021-01-19 NOTE — PROGRESS NOTES
Comprehensive Nutrition Assessment Type and Reason for Visit: Sunday Rife Nutrition Recommendations/Plan:  
? Resume current TF regimen when pt s/p trach and NGT placed with confirmation Malnutrition Assessment: 
Malnutrition Status: Insufficient data Nutrition Assessment:  
Nutrition History: 12/31: 3 recorded meals in past 6 days with average intake of 83% Nutrition Background: H/O: CAD, HTN, DM, HLD, peripheral neuropathy, CKD stage III. Presented with dyspnea. Findings of +COVID. Admitted to ICU with ARDS, severe acute hypoxic due to COVID PNA, PRATIMA on CKD. Was requiring BIPAP at night and Aviro during day. Had brief cardiac arrest and was intubated 12/30 Daily Update: 
TF being held for trach placement today. Per RN note 1/18, OGT tube clogged during residual check and was removed. Per RN, NGT to be placed once trach placed. RN reports pt was tolerating feeds prior to stopping. Pt continues with FMS. Re-evaluation for SLED tomorrow per nephrology note. Abdominal Status (last documented): Intact, Diarrhea, Soft abdomen with Active  bowel sounds. Last BM 01/19/21. Pertinent Medications: Dulcolax (PRN), Decadron, Colace (PRN), Pepcid, Lantus (8 units), SSI (27units 1/18, 0 units thus far today) Drips: Precedex, Fentanyl, Levophed (stopped 0529), Propofol (ordered, not infusing) Pertinent Labs: Sodium 140 Nutrition Related Findings:  
NFPE deferred d/t isolation. Intubated and OGT placed 12/30. CRRT initiated on 1/12. TF formula changed to a renal formula on 1/11. Current Nutrition Therapies: DIET NPO 
DIET TUBE FEEDING Administer 3 pouches Prosource TF via open syringe into FT @ 1800 daily. Flush FT with 50 ml water before and after administration of protein. Current Tube Feeding (TF) Orders: · Feeding Route: Orogastric · Formula: Nepro · Schedule:Continuous · Regimen: 35 ml/hr · Additives/Modulars: Protein(3 packets prosource tube feeding q day) · Water Flushes: 65 ml q 4 hours · Current TF & Flush Orders Provides: held at present for trach placement · Goal TF & Flush Orders Provides: 1632 kcal (100% kcal needs), 101 gm PRO (100% estimated protein needs), 1059 ml fluid/day (100% of fluid needs) Current Intake: Average Meal Intake: NPO Average Supplement Intake: NPO Additional Caloric Sources: ( ) Anthropometric Measures: 
Height: 5' 5\" (165.1 cm) Current Body Wt: 81.1 kg (178 lb 12.7 oz)(1/18), Weight source: Not specified BMI: 29.8, Overweight (BMI 25.0-29. 9) Admission Body Weight: 159 lb 2.8 oz(Bedscale) Ideal Body Wt: 125 lbs (57 kg), 123.8 % Usual Body Wt: 73.5 kg (162 lb)(Per EMR 11/5/82020), Percent weight change: -4.5 Estimated Daily Nutrient Needs: 
Energy (kcal/day): 2525-5093 (Kcal/kg(25-30), Weight Used: Ideal(56.8)) Protein (g/day): (1.7-2.5gm/kg-- CRRT) Weight Used: (Ideal) Fluid (ml/day):   (Standard renal) Nutrition Diagnosis:  
· Inadequate oral intake related to impaired respiratory function as evidenced by (intubated, NPO, TF to meet needs) · Increased nutrient needs related to renal dysfunction as evidenced by (CRRT) Nutrition Interventions:  
Food and/or Nutrient Delivery: Continue NPO, Continue tube feeding Coordination of Nutrition Care: Continue to monitor while inpatient Plan of Care discussed with Yoli Osborne Goals:  
Previous Goal Met: Goal(s) achieved Active Goal: Continue to meet estimated nutrition needs via EN Nutrition Monitoring and Evaluation:  
  
Food/Nutrient Intake Outcomes: Enteral nutrition intake/tolerance Physical Signs/Symptoms Outcomes: Biochemical data, GI status Discharge Planning: Too soon to determine Electronically signed by Bernadette Bermudez MS, RDN, LD 1/19/2021 at 2:07 PM 
Contact: [phone] Disaster Mode active

## 2021-01-19 NOTE — PROGRESS NOTES
[facility] PROGRESS NOTE Follow up for: PRATIMA Subjective:  
Patient seen and examined on [facility] ICU unit remains intubated on vent 40 %, 12 PEEP, sedated, poor uop via coyle. Labs and chart reviewed-  ICU RN  
 
ROS: 
Unable to obtain Objective:  
Exam: 
Vitals:  
 01/19/21 0919 01/19/21 0934 01/19/21 0949 01/19/21 1003 BP: (!) 115/57 (!) 166/85 (!) 151/78 (!) 156/87 Pulse: 61 97 82 96 Resp: (!) 32 (!) 40 (!) 36 Temp:      
SpO2: 93% 94% 96% 93% Weight:      
Height:      
 
PE: 
General: intubated on Vent. Lung: rhonchi bilaterally CV; Regular rate, S1, S2, no Rub Abd: soft, non tender, + BS Ext: trace edema Access: right temp line-intact Intake/Output Summary (Last 24 hours) at 1/19/2021 1021 Last data filed at 1/19/2021 0600 Gross per 24 hour Intake 1162.72 ml Output 2368 ml Net -1205.28 ml Current Facility-Administered Medications Medication Dose Route Frequency  dexamethasone (DECADRON) 4 mg/mL injection 4 mg  4 mg IntraVENous Q24H  
 insulin glargine (LANTUS) injection 8 Units  8 Units SubCUTAneous QHS  DOPamine (INTROPIN) 800 mg in dextrose 5% 250 mL infusion  0-20 mcg/kg/min IntraVENous TITRATE  docusate (COLACE) 50 mg/5 mL oral liquid 100 mg  100 mg Per NG tube DAILY PRN  
 heparin (porcine) 1,000 unit/mL injection 5,000 Units  5,000 Units IntraVENous DIALYSIS PRN  
 insulin regular (NOVOLIN R, HUMULIN R) injection   SubCUTAneous Q4H  
 alcohol 62% (NOZIN) nasal  1 Ampule  1 Ampule Topical Q12H  
 NOREPINephrine (LEVOPHED) 4 mg in 5% dextrose 250 mL infusion  0.5-30 mcg/min IntraVENous TITRATE  
 0.9% sodium chloride infusion 250 mL  250 mL IntraVENous PRN  
 heparin (porcine) injection 5,000 Units  5,000 Units SubCUTAneous Q8H  
 midazolam in normal saline (VERSED) 1 mg/mL infusion  0-10 mg/hr IntraVENous TITRATE  fentaNYL in normal saline (pf) 25 mcg/mL infusion  0-200 mcg/hr IntraVENous TITRATE  sodium chloride 3% hypertonic nebulizer soln  4 mL Nebulization BID  dexmedeTOMidine (PRECEDEX) 400 mcg in 0.9% sodium chloride 100 mL infusion  0.1-1.5 mcg/kg/hr IntraVENous TITRATE  fentaNYL citrate (PF) injection 50 mcg  50 mcg IntraVENous Q2H PRN  
 bisacodyL (DULCOLAX) suppository 10 mg  10 mg Rectal DAILY PRN  
 [Held by provider] sodium zirconium cyclosilicate (LOKELMA) powder packet 5 g  5 g Oral DAILY  NUTRITIONAL SUPPORT ELECTROLYTE PRN ORDERS   Does Not Apply PRN  
 [Held by provider] hydrALAZINE (APRESOLINE) tablet 10 mg  10 mg Per NG tube TID  famotidine (PF) (PEPCID) 20 mg in 0.9% sodium chloride 10 mL injection  20 mg IntraVENous DAILY  dextrose 40% (GLUTOSE) oral gel 1 Tube  15 g Oral PRN  
 glucagon (GLUCAGEN) injection 1 mg  1 mg IntraMUSCular PRN  
 dextrose (D50W) injection syrg 12.5-25 g  25-50 mL IntraVENous PRN  
 albuterol (PROVENTIL HFA, VENTOLIN HFA, PROAIR HFA) inhaler 2 Puff  2 Puff Inhalation Q4H PRN  
 hydrALAZINE (APRESOLINE) 20 mg/mL injection 20 mg  20 mg IntraVENous Q2H PRN  phenol throat spray (CHLORASEPTIC) 1 Spray  1 Spray Oral PRN  
 lip protectant (BLISTEX) ointment 1 Each  1 Each Topical PRN  
 acetaminophen (TYLENOL) tablet 650 mg  650 mg Oral Q6H PRN  
 sodium chloride (NS) flush 5-40 mL  5-40 mL IntraVENous Q8H  
 sodium chloride (NS) flush 5-40 mL  5-40 mL IntraVENous PRN  
 albuterol (PROVENTIL VENTOLIN) nebulizer solution 2.5 mg  2.5 mg Nebulization Q6H RT  
 
 
Physical EXAM MD did not go into room GEN - intubated, sedated on vent CV - Regular rate, S1, S2, no Rub Lung - coarse bilaterally Abd -  Soft, non tender, + BS Ext - trace BLE edema Recent Labs  
  01/19/21 
0255 01/18/21 
0306 01/17/21 
0326 WBC 11.6* 10.4 9.1 HGB 8.4* 8.7* 8.4* HCT 27.0* 27.4* 26.2*  
 183 158 Recent Labs  
  01/19/21 
0255 01/18/21 
0306 01/17/21 
0326  139 139  
K 4.6 4.5 4.3  103 105 CO2 33* 30 34* BUN 30* 50* 22 CREA 1.75* 2.38* 1.27* CA 8.8 8.9 8.7 GLU 96 220* 144* MG  --  2.4  --   
PHOS  --  3.6  -- Assessment and Plan: 1. PRATIMA on CKD, oliguric COVID ATN , SLED yesterday, poor uop via coyle, check labs in AM and will decide on further RRT via SLED in AM  
 
2. Hyperkalemia stable  
  
3. COVID 19 +/acute respiratory failure/ARDs ventilated per pulm 4. Anemia stable Harden Veto, NP

## 2021-01-20 NOTE — PROGRESS NOTES
LOS 26d BMT/ICU Vent/ trached 1/19 Fi02 60% VC+ PPD 12/28 PT and OT will need to evaluate when medically stable. SLED resume on 1/21 for volume control per Renal note LTACH referral sent for review by liaison from [facility] Will continue to follow for discharge planning needs Please consult  if any new issues arise

## 2021-01-20 NOTE — PROGRESS NOTES
Antonio Martin 149 COVID-19 Note: 
 
Critical Care Note: 1/20/2021 Derrick Gomez Admission Date: 12/24/2020     Length of Stay: 27 days Background: 78 y.o. y/o female with acute hypoxemic respiratory failure secondary to COVID-19, s/p prox XLT Shiley cuffed trach on 1/19 Onset of COVID-19 symptoms: 12/24 Notable PMH: DM, CAD, HTN, CKD 
 
24 Hour events: P:F 131. Trach'ed yesterday. Fluid balance positive. Creatinine up a bit and K elevated. D-dimer not highly elevated Afebrile FSBS elevated at 225 today ROS: intubated, sedated Lines: (insertion date) Quad Lumen 12/30/20 Anterior; Left Internal jugular Orogastric Tube 12/30/20 Urinary Catheter 01/05/21 Clifton Airway - Trach 1/19. Size 6 prox XLT cuffed Shiley Wound Wrist Left;Dorsal 12/31/20 Wound Ankle Anterior Drips: current dose (range) precedex 1 Fentanyl 175 Visit Vitals /61 Pulse (!) 49 Temp 97.6 °F (36.4 °C) Resp 30 Ht 5' 5\" (1.651 m) Wt 178 lb 12.7 oz (81.1 kg) SpO2 100% BMI 29.75 kg/m² Pertinent Exam:           
Constitutional:  intubated and mechanically ventilated. EENMT:  Sclera clear, pupils equal, oral mucosa moist 
Respiratory: coarse with rhonchi 
Cardiovascular:  RRR Gastrointestinal:  soft with no tenderness; positive bowel sounds present Musculoskeletal:  warm with no cyanosis, no lower extremity edema Skin:  no jaundice or ecchymosis Neurologic: minimally responsive Psychiatric: sedated CXR Pertinent Labs:  
Recent Labs  
  01/20/21 
0251 01/19/21 
0255 01/18/21 
1214 WBC 11.4* 11.6* 10.4 HGB 7.8* 8.4* 8.7* HCT 26.0* 27.0* 27.4*  
 182 183 Recent Labs  
  01/20/21 
0251 01/19/21 
0255 01/18/21 
5501  140 139  
K 5.2* 4.6 4.5  
 105 103 CO2 31 33* 30  
* 96 220* BUN 53* 30* 50* CREA 2.54* 1.75* 2.38* MG 2.3  --  2.4 CA 8.6 8.8 8.9 PHOS 5.5*  --  3.6 Recent Labs  
  01/20/21 
0736 01/19/21 459 [id number] 01/19/21 2032 GLUCPOC 180* 175* 218* MICRO Date Source Result 1/5 blood NG  
1/5 blood NG  
1/5 sputum Heavy MRSA  
1/5 urine 11K c. glabrata COVID-19 Meds: 
Dexamethasone 6mg daily (1/8-1/18) Anti-infectives (start date): 
None current Ventilator Settings:  5' 5\" (1.651 m) Mode FIO2 Rate Tidal Volume Pressure PEEP  
VC+  60 %    400 ml  12 cm H2O  12 cm H20 Peak airway pressure: 34 cm H2O Minute ventilation: 15.4 l/min ABG:  
Recent Labs  
  01/20/21 
0409 01/19/21 
0448 PHI 7.31* 7.43  
PCO2I 56.9* 46.9*  
PO2I 79 73* HCO3I 28.5* 31.3* You know that tracheostomy is scheduled atAnd still be comfortable and sober working on at some more today Plan:  (Medical Decision Making) 1  NEURO:  
1. Sedation: stop continuous infusions 2. Analgesia: stop continuous infusions. Use prns. 3.  Delirium:  Use precedex, tenex, risperdal.  CT head. CV: 1. Volume Status:  RRT 2. Hypotension: back on pressors with dialysis PULM:  
1. Acute hypoxemic/hypercapneic respiratory failure:  Continue low tidal volume ventilation. 2. Severe ARDS 2/2 COVID: Low Tidal Volume ventilation, goal 6cc per kg (Ideal body weight: 57 kg (125 lb 10.6 oz) x 6 = 348ml). Attempt to limit driving pressure to 53OZ/P0L or less. Allow permissive hypercapnia/acidosis to pH 7.25 if needed to achieve above. RENAL: 
1. PRATIMA: on SLED, tolerating reasonably well. GI: NPO, PPI prophy 1. Nutrition: continue TF 
HEME: no acute issues 1. Thrombocytopenia: resolved.  HIT negative 2. Anticoagulation: d-dimer to determine anticoagulation ppx for VTE. ID: WBC elevated, afebrile. 1. COVID-19: COVID meds as above. taper off decadron 2. MRSA Pneumonia: Completed vanco 
ENDO: 
1. DM: decrease lantus as steroids tapered. Skin: no decub, turns, preventive care BNMICO: [id number] 
-- 
DNR, Maya Luu [phone], was updated. Appreciate infection control review and we have d/c'ed precautions, await bed in non-COVID ICU 
  
Critical care time 33 minutes More than 50% of the time documented was spent in face-to-face contact with the patient and in the care of the patient on the floor/unit where the patient is located. 
Emma Cantrell MD

## 2021-01-20 NOTE — PROGRESS NOTES
Ventilator check complete; patient has a #6,  tracheostomy. Patient is sedated. Patient is able to follow commands. Breath sounds are coarse. Trachea is midline, Negative for subcutaneous air, and chest excursion is symmetric. Patient is also Negative for cyanosis and is Positive for pitting edema. All alarms are set and audible. Resuscitation bag is at the head of the bed. Ventilator Settings Mode FIO2 Rate Tidal Volume Pressure PEEP I:E Ratio VC+  60 %   30 400 ml    12 cm H20  1:1.1 Peak airway pressure: 34 cm H2O Minute ventilation: 15.4 l/min ABG: No results for input(s): PH, PCO2, PO2, HCO3 in the last 72 hours.  
 
 
Rebecca Lutz, RT

## 2021-01-20 NOTE — PROGRESS NOTES
Ventilator check complete; patient has a #6. 0 Shiley XLT tracheostomy. Patient is sedated. Patient is not able to follow commands. Breath sounds are coarse and diminished. Trachea is midline, Negative for subcutaneous air, and chest excursion is symmetric. Patient is also Negative for cyanosis and is Negative for pitting edema. All alarms are set and audible. Resuscitation bag is at the head of the bed. Ventilator Settings Mode FIO2 Rate Tidal Volume PEEP I:E Ratio VC+  60 %   30 bpm 400 ml  12 cm H20  1:1.5 Peak airway pressure: 27 cm H2O Minute ventilation: 12.6 l/min Rosalba Fischer, RT

## 2021-01-20 NOTE — PROGRESS NOTES
[facility] PROGRESS NOTE Follow up for: PRATIMA Subjective:  
Patient seen and examined on [facility] ICU unit trached on vent 60 %, 12 PEEP, sedated. Labs and chart reviewed-  ICU RN  
 
ROS: 
Unable to obtain Objective:  
Exam: 
Vitals:  
 01/20/21 0845 01/20/21 0900 01/20/21 0915 01/20/21 0930 BP: (!) 114/57 (!) 118/51 124/64 119/61 Pulse: (!) 53 (!) 49 (!) 54 (!) 49 Resp: 30 Temp:      
SpO2: 98% 99% 99% 100% Weight:      
Height:      
 
PE: 
General: trached on Vent. Lung: rhonchi bilaterally CV; Regular rate, S1, S2, no Rub Abd: soft, non tender, + BS Ext: trace edema Access: right temp line-intact Intake/Output Summary (Last 24 hours) at 1/20/2021 1033 Last data filed at 1/20/2021 0600 Gross per 24 hour Intake 898.5 ml Output 295 ml Net 603.5 ml  
 
 
Current Facility-Administered Medications Medication Dose Route Frequency  guanFACINE IR (TENEX) tablet 1 mg  1 mg Oral BID  risperiDONE (RisperDAL) 1 mg/mL oral solution soln 0.5 mg  0.5 mg Nasogastric BID  propofol (DIPRIVAN) 10 mg/mL infusion  0-50 mcg/kg/min IntraVENous TITRATE  fentaNYL citrate (PF) injection 100 mcg  100 mcg IntraVENous PRN  
 dexamethasone (DECADRON) 4 mg/mL injection 4 mg  4 mg IntraVENous Q24H  
 insulin glargine (LANTUS) injection 8 Units  8 Units SubCUTAneous QHS  DOPamine (INTROPIN) 800 mg in dextrose 5% 250 mL infusion  0-20 mcg/kg/min IntraVENous TITRATE  docusate (COLACE) 50 mg/5 mL oral liquid 100 mg  100 mg Per NG tube DAILY PRN  
 heparin (porcine) 1,000 unit/mL injection 5,000 Units  5,000 Units IntraVENous DIALYSIS PRN  
 insulin regular (NOVOLIN R, HUMULIN R) injection   SubCUTAneous Q4H  
 alcohol 62% (NOZIN) nasal  1 Ampule  1 Ampule Topical Q12H  
 NOREPINephrine (LEVOPHED) 4 mg in 5% dextrose 250 mL infusion  0.5-30 mcg/min IntraVENous TITRATE  
 0.9% sodium chloride infusion 250 mL  250 mL IntraVENous PRN  
  heparin (porcine) injection 5,000 Units  5,000 Units SubCUTAneous Q8H  
 midazolam in normal saline (VERSED) 1 mg/mL infusion  0-10 mg/hr IntraVENous TITRATE  fentaNYL in normal saline (pf) 25 mcg/mL infusion  0-200 mcg/hr IntraVENous TITRATE  dexmedeTOMidine (PRECEDEX) 400 mcg in 0.9% sodium chloride 100 mL infusion  0.1-1.5 mcg/kg/hr IntraVENous TITRATE  fentaNYL citrate (PF) injection 50 mcg  50 mcg IntraVENous Q2H PRN  
 bisacodyL (DULCOLAX) suppository 10 mg  10 mg Rectal DAILY PRN  
 NUTRITIONAL SUPPORT ELECTROLYTE PRN ORDERS   Does Not Apply PRN  
 [Held by provider] hydrALAZINE (APRESOLINE) tablet 10 mg  10 mg Per NG tube TID  famotidine (PF) (PEPCID) 20 mg in 0.9% sodium chloride 10 mL injection  20 mg IntraVENous DAILY  dextrose 40% (GLUTOSE) oral gel 1 Tube  15 g Oral PRN  
 glucagon (GLUCAGEN) injection 1 mg  1 mg IntraMUSCular PRN  
 dextrose (D50W) injection syrg 12.5-25 g  25-50 mL IntraVENous PRN  
 albuterol (PROVENTIL HFA, VENTOLIN HFA, PROAIR HFA) inhaler 2 Puff  2 Puff Inhalation Q4H PRN  
 hydrALAZINE (APRESOLINE) 20 mg/mL injection 20 mg  20 mg IntraVENous Q2H PRN  phenol throat spray (CHLORASEPTIC) 1 Spray  1 Spray Oral PRN  
 lip protectant (BLISTEX) ointment 1 Each  1 Each Topical PRN  
 acetaminophen (TYLENOL) tablet 650 mg  650 mg Oral Q6H PRN  
 sodium chloride (NS) flush 5-40 mL  5-40 mL IntraVENous Q8H  
 sodium chloride (NS) flush 5-40 mL  5-40 mL IntraVENous PRN  
 albuterol (PROVENTIL VENTOLIN) nebulizer solution 2.5 mg  2.5 mg Nebulization Q6H RT  
 
 
Recent Labs  
  01/20/21 
0251 01/19/21 
0255 01/18/21 
0306 WBC 11.4* 11.6* 10.4 HGB 7.8* 8.4* 8.7* HCT 26.0* 27.0* 27.4*  
 182 183 Recent Labs  
  01/20/21 
0251 01/19/21 
0255 01/18/21 
2160  140 139  
K 5.2* 4.6 4.5  
 105 103 CO2 31 33* 30  
BUN 53* 30* 50* CREA 2.54* 1.75* 2.38* CA 8.6 8.8 8.9 * 96 220* MG 2.3  --  2.4 PHOS 5.5*  --  3.6 Assessment and Plan: 1. PRATIMA on CKD, oliguric COVID ATN , SLED tomorrow for volume and clearance 2. Hyperkalemia  Should improve with SLED  
  
3. COVID 19 +/acute respiratory failure/ARDs ventilated per pulm 4. Anemia transfuse hgb < 7.0 Duane Deluna NP

## 2021-01-21 NOTE — PROGRESS NOTES
Pt transferred to 2nd floor via bed with monitor. Pt tolerated transfer. RT at bedside during transfer. Briefly reviewed pt info with Ankit APONTE at time of arrival to new room. Ankit APONTE accepted this patient and will now be responsible for her care.

## 2021-01-21 NOTE — PROGRESS NOTES
Ventilator check complete; patient has a #6 tracheostomy. Patient is sedated. Patient is not able to follow commands. Breath sounds are coarse. Trachea is midline, Negative for subcutaneous air, and chest excursion is symmetric. Patient is also Negative for cyanosis and is Negative for pitting edema. All alarms are set and audible. Resuscitation bag is at the head of the bed. Ventilator Settings Mode FIO2 Rate Tidal Volume Pressure PEEP I:E Ratio VC+  60 %    400 ml    12 cm H20  1:1.1 Peak airway pressure: 23 cm H2O Minute ventilation: 15.3 l/min ABG: 
 
Mariel Renner, RT

## 2021-01-21 NOTE — DIALYSIS
8 HR SLED initiated using  R CVC. Aspirated and flushed both catheter ports without problem. Machine settings per MD order. 150  DFR  300 UFR Heparin 3000 unit bolus and 500 ml/hr. Reported to primary nurse. Dialysis nurse available as needed.

## 2021-01-21 NOTE — PROGRESS NOTES
[facility] PROGRESS NOTE Follow up for: PRATIMA Subjective:  
Patient seen and examined on 
 trached on vent 60 %, 12 PEEP Labs and chart reviewed- no sign of renal recovery ROS: 
Unable to obtain Objective:  
Exam: 
Vitals:  
 01/21/21 1016 01/21/21 1031 01/21/21 1046 01/21/21 1100 BP: (!) 86/47 (!) 82/48 (!) 86/54 (!) 88/54 Pulse: (!) 53 (!) 53 (!) 52 (!) 53 Resp:      
Temp:      
SpO2: 100% 100% 100% 100% Weight:      
Height:      
 
PE: 
General: trached on Vent. Lung: rhonchi bilaterally CV; Regular rate, S1, S2, no Rub Abd: soft, non tender, + BS Ext: trace edema Access: right temp line-intact Intake/Output Summary (Last 24 hours) at 1/21/2021 1134 Last data filed at 1/21/2021 1020 Gross per 24 hour Intake 1648.62 ml Output 620 ml Net 1028.62 ml Current Facility-Administered Medications Medication Dose Route Frequency  dexamethasone (DECADRON) 10 mg/mL injection 2 mg  2 mg IntraVENous Q24H  
 guanFACINE IR (TENEX) tablet 1 mg  1 mg Oral BID  risperiDONE (RisperDAL) 1 mg/mL oral solution soln 0.5 mg  0.5 mg Nasogastric BID  insulin glargine (LANTUS) injection 8 Units  8 Units SubCUTAneous QHS  docusate (COLACE) 50 mg/5 mL oral liquid 100 mg  100 mg Per NG tube DAILY PRN  
 heparin (porcine) 1,000 unit/mL injection 5,000 Units  5,000 Units IntraVENous DIALYSIS PRN  
 insulin regular (NOVOLIN R, HUMULIN R) injection   SubCUTAneous Q4H  
 alcohol 62% (NOZIN) nasal  1 Ampule  1 Ampule Topical Q12H  
 0.9% sodium chloride infusion 250 mL  250 mL IntraVENous PRN  
 heparin (porcine) injection 5,000 Units  5,000 Units SubCUTAneous Q8H  
 fentaNYL in normal saline (pf) 25 mcg/mL infusion  0-200 mcg/hr IntraVENous TITRATE  dexmedeTOMidine (PRECEDEX) 400 mcg in 0.9% sodium chloride 100 mL infusion  0.1-1.5 mcg/kg/hr IntraVENous TITRATE  fentaNYL citrate (PF) injection 50 mcg  50 mcg IntraVENous Q2H PRN  
 • bisacodyL (DULCOLAX) suppository 10 mg  10 mg Rectal DAILY PRN  
• NUTRITIONAL SUPPORT ELECTROLYTE PRN ORDERS   Does Not Apply PRN  
• [Held by provider] hydrALAZINE (APRESOLINE) tablet 10 mg  10 mg Per NG tube TID  
• famotidine (PF) (PEPCID) 20 mg in 0.9% sodium chloride 10 mL injection  20 mg IntraVENous DAILY  
• dextrose 40% (GLUTOSE) oral gel 1 Tube  15 g Oral PRN  
• glucagon (GLUCAGEN) injection 1 mg  1 mg IntraMUSCular PRN  
• dextrose (D50W) injection syrg 12.5-25 g  25-50 mL IntraVENous PRN  
• albuterol (PROVENTIL HFA, VENTOLIN HFA, PROAIR HFA) inhaler 2 Puff  2 Puff Inhalation Q4H PRN  
• hydrALAZINE (APRESOLINE) 20 mg/mL injection 20 mg  20 mg IntraVENous Q2H PRN  
• phenol throat spray (CHLORASEPTIC) 1 Spray  1 Spray Oral PRN  
• lip protectant (BLISTEX) ointment 1 Each  1 Each Topical PRN  
• acetaminophen (TYLENOL) tablet 650 mg  650 mg Oral Q6H PRN  
• sodium chloride (NS) flush 5-40 mL  5-40 mL IntraVENous Q8H  
• sodium chloride (NS) flush 5-40 mL  5-40 mL IntraVENous PRN  
• albuterol (PROVENTIL VENTOLIN) nebulizer solution 2.5 mg  2.5 mg Nebulization Q6H RT  
 
 
Recent Labs  
  01/21/21 
0325 01/20/21 
0251 01/19/21 
0255  
WBC 12.7* 11.4* 11.6*  
HGB 7.9* 7.8* 8.4*  
HCT 26.0* 26.0* 27.0*  
 183 182  
  
 
Recent Labs  
  01/21/21 
0325 01/20/21 
0251 01/19/21 
0255  
 139 140  
K 5.5* 5.2* 4.6  
 106 105  
CO2 29 31 33*  
BUN 70* 53* 30*  
CREA 2.89* 2.54* 1.75*  
CA 9.1 8.6 8.8  
* 120* 96  
MG  --  2.3  --   
PHOS  --  5.5*  --   
 
 
Assessment and Plan:  
 
1. PRATIMA on CKD, oliguric  
COVID ATN , SLED today for volume and clearance  
 
2. Hyperkalemia  Should improve with SLED  
  
3. COVID 19 +/acute respiratory failure/ARDs ventilated per pulm  
 
4. Anemia transfuse hgb < 7.0  
 
Kim Briscoe MD

## 2021-01-21 NOTE — PROGRESS NOTES
Comprehensive Nutrition Assessment Type and Reason for Visit: [place] Door Tube Feeding Management (Pulmonary) Nutrition Recommendations/Plan:  
· Enteral Nutrition: · Continue current enteral regimen: · Nepro via OGT at goal rate of 35ml/hr. · 3 packets Prosource TF to be given at 1800 with 50ml free water flush before and after. · Water flush to 65ml Q4H. Malnutrition Assessment: 
Malnutrition Status: Insufficient data Nutrition Assessment:  
Nutrition History: 12/31: 3 recorded meals in past 6 days with average intake of 83% Nutrition Background: H/O: CAD, HTN, DM, HLD, peripheral neuropathy, CKD stage III. Presented with dyspnea. Findings of +COVID. Admitted to ICU with ARDS, severe acute hypoxic due to COVID PNA, PRATIMA on CKD. Was requiring BIPAP at night and Aviro during day. Had brief cardiac arrest and was intubated 12/30 Daily Update: 
Pt transferred from Covid unit, TF infusing at goal. SLED planned today per Nephrology. Abdominal Status (last documented): Obese, Distended, Intact abdomen with Active  bowel sounds. Last BM 01/20/21. +FMS 50 ml output recorded Pertinent Medications: decadron, lantus, SSI, precedex, fentanyl Versed and diprivan stopped 1/19. Pertinent Labs:  
Lab Results Component Value Date/Time Sodium 141 01/21/2021 03:25 AM  
 Potassium 5.5 (H) 01/21/2021 03:25 AM  
 Chloride 107 01/21/2021 03:25 AM  
 CO2 29 01/21/2021 03:25 AM  
 Anion gap 5 (L) 01/21/2021 03:25 AM  
 Glucose 137 (H) 01/21/2021 03:25 AM  
 BUN 70 (H) 01/21/2021 03:25 AM  
 Creatinine 2.89 (H) 01/21/2021 03:25 AM  
 Calcium 9.1 01/21/2021 03:25 AM  
 Albumin 3.0 (L) 01/06/2021 02:02 AM  
 Phosphorus 5.5 (H) 01/20/2021 02:51 AM  
Pt is on the lowest phos and potassium containing formula. Nutrition Related Findings:  
Intubated and OGT placed 12/30. CRRT initiated on 1/12. TF formula changed to a renal formula on 1/11. s/p Trach 1/19, NG placed 1/19. Current Nutrition Therapies: DIET NPO 
DIET TUBE FEEDING Administer 3 pouches Prosource TF via open syringe into FT @ 1800 daily. Flush FT with 50 ml water before and after administration of protein. Current Tube Feeding (TF) Orders: · Feeding Route: Nasogastric · Formula: Nepro · Schedule:Continuous · Regimen: 35 ml/hr · Additives/Modulars: Protein(3 packets prosource tube feeding q day) · Water Flushes: 65 ml q 4 hours · Current TF & Flush Orders Provides: infusing at goal 
· Goal TF & Flush Orders Provides: 1632 kcal (100% kcal needs), 101 gm PRO (100% estimated protein needs), 1059 ml fluid/day (100% of fluid needs) Current Intake: Average Meal Intake: NPO Average Supplement Intake: NPO Additional Caloric Sources: ( ) Anthropometric Measures: 
Height: 5' 5\" (165.1 cm) Current Body Wt: 76 kg (167 lb 8.8 oz)(1/21), Weight source: Not specified BMI: 27.9, Overweight (BMI 25.0-29. 9) Admission Body Weight: 159 lb 2.8 oz(Bedscale) Ideal Body Wt: 125 lbs (57 kg), 123.8 % Usual Body Wt: 73.5 kg (162 lb)(Per EMR 11/5/82020), Percent weight change: -4.5 Estimated Daily Nutrient Needs: 
Energy (kcal/day): 9894-4209 (Kcal/kg(25-30), Weight Used: Ideal(56.8)) Protein (g/day): (1.7-2.5gm/kg-- CRRT) Weight Used: (Ideal) Fluid (ml/day):   (Standard renal) Nutrition Diagnosis:  
· Inadequate oral intake related to impaired respiratory function as evidenced by (intubated, NPO, TF to meet needs) · Increased nutrient needs related to renal dysfunction as evidenced by (CRRT) Nutrition Interventions:  
Food and/or Nutrient Delivery: Continue NPO, Continue tube feeding Coordination of Nutrition Care: Continue to monitor while inpatient Plan of Care discussed with Lisaia bobbi [facility], RNs. Goals:  
Previous Goal Met: Goal(s) achieved Active Goal: Maintain TF to meet estimated needs Nutrition Monitoring and Evaluation:  
  
Food/Nutrient Intake Outcomes: Enteral nutrition intake/tolerance Physical Signs/Symptoms Outcomes: Biochemical data, GI status Discharge Planning: Too soon to determine Rodo Joiner RD, LDN on 1/21/2021 at 12:08 PM 
Contact: [phone] Disaster Mode active

## 2021-01-21 NOTE — PROGRESS NOTES
TRANSFER - IN REPORT: 
 
Verbal report received from [facility]) on Yue Manual  being received from BMT ICU overflow(unit) for routine progression of care Report consisted of patients Situation, Background, Assessment and  
Recommendations(SBAR). Information from the following report(s) SBAR, Kardex, ED Summary, Intake/Output, MAR and Cardiac Rhythm SB/NSR was reviewed with the receiving nurse. Opportunity for questions and clarification was provided. Assessment completed upon patients arrival to unit and care assumed.

## 2021-01-21 NOTE — PROGRESS NOTES
Antonio Martin 149 COVID-19 Note: 
 
Critical Care Note: 1/21/2021 Kate Courtney Admission Date: 12/24/2020     Length of Stay: 28 days Background: 78 y.o. y/o female with acute hypoxemic respiratory failure secondary to COVID-19, s/p prox XLT Shiley cuffed trach on 1/19 Onset of COVID-19 symptoms: 12/24 Notable PMH: DM, CAD, HTN, CKD 
 
24 Hour events:  
 
Weaned to 60%. Sat 100. Moved to 2nd floor ICU off COVID precautions. Had head CT last PM for dysconjugate gaze which was negative. Placed on PCV for distress with some improvement. ROS: intubated, sedated Lines: (insertion date) Quad Lumen 12/30/20 Anterior; Left Internal jugular Orogastric Tube 12/30/20 Urinary Catheter 01/05/21 [place] Airway - Trach 1/19. Size 6 prox XLT cuffed Shiley Wound Wrist Left;Dorsal 12/31/20 Wound Ankle Anterior Drips: current dose (range) precedex 1.2 Fentanyl 200 Visit Vitals BP (!) 88/54 Pulse 61 Temp 97.4 °F (36.3 °C) Resp 28 Ht 5' 5\" (1.651 m) Wt 167 lb 8.8 oz (76 kg) SpO2 100% BMI 27.88 kg/m² Pertinent Exam:           
Constitutional:  trached and mechanically ventilated. EENMT:  Sclera clear, pupils equal, oral mucosa moist 
Respiratory: coarse with rhonchi; using accessory muscles. Cardiovascular:  RRR Gastrointestinal:  soft with no tenderness; positive bowel sounds present Musculoskeletal:  warm with no cyanosis, no lower extremity edema Skin:  no jaundice or ecchymosis Neurologic: minimally responsive Psychiatric: sedated CXR Pertinent Labs:  
Recent Labs  
  01/21/21 
0325 01/20/21 
0251 01/19/21 
0255 WBC 12.7* 11.4* 11.6* HGB 7.9* 7.8* 8.4* HCT 26.0* 26.0* 27.0*  
 183 182 Recent Labs  
  01/21/21 
0325 01/20/21 
0251 01/19/21 
0255  139 140  
K 5.5* 5.2* 4.6  106 105 CO2 29 31 33* * 120* 96 BUN 70* 53* 30* CREA 2.89* 2.54* 1.75* MG  --  2.3  --   
CA 9.1 8.6 8.8 PHOS  --  5.5*  --   
 
Recent Labs  
  01/21/21 
1112 01/21/21 
0944 01/21/21 
0346 GLUCPOC 122* 126* 128* MICRO Date Source Result 1/5 blood NG  
1/5 blood NG  
1/5 sputum Heavy MRSA  
1/5 urine 11K c. glabrata COVID-19 Meds: 
Dexamethasone 6mg daily (1/8-1/18) Anti-infectives (start date): 
None current Ventilator Settings:  5' 5\" (1.651 m) Mode FIO2 Rate Tidal Volume Pressure PEEP Pressure control, Assist control  60 %    400 ml  12 cm H2O  12 cm H20 Peak airway pressure: 28 cm H2O Minute ventilation: 12.4 l/min ABG:  
Recent Labs  
  01/21/21 
0256 01/20/21 
0409 01/19/21 
0448 PHI 7.29* 7.31* 7.43  
PCO2I 57.7* 56.9* 46.9*  
PO2I 89 79 73* HCO3I 27.8* 28.5* 31.3* You know that tracheostomy is scheduled atAnd still be comfortable and sober working on at some more today Plan:  (Medical Decision Making) 1  NEURO:  
1. Sedation: wean sedation a little 2. Analgesia: wean fentanyl 3. Delirium:  Use precedex, tenex, risperdal.  CT head. CV: 1. Volume Status:  RRT 2. Hypotension: BP marginal but off pressors PULM:  
1. Acute hypoxemic/hypercapneic respiratory failure:  Continue low tidal volume ventilation. Wean FIO2 as able on PCV. 2. Severe ARDS 2/2 COVID: Low Tidal Volume ventilation, goal 6cc per kg (Ideal body weight: 57 kg (125 lb 10.6 oz) x 6 = 348ml). Attempt to limit driving pressure to 10RK/O4R or less. Allow permissive hypercapnia/acidosis to pH 7.25 if needed to achieve above. RENAL: 
1. PRATIMA: per renal 
GI: NPO, PPI prophy 1. Nutrition: continue TF 
HEME: no acute issues 1. Thrombocytopenia: resolved.  HIT negative 2. Anticoagulation: d-dimer to determine anticoagulation down to 2.54. Had been over 7. Continue heparin ID: WBC elevated, afebrile. 1. COVID-19: COVID meds as above; taper off decadron 2. MRSA Pneumonia: Completed vanco 
ENDO: 
1. DM: decrease lantus as steroids tapered. Skin: no decub, turns, preventive care QXTHBH: [id number] 
-- 
DNR, Adelene Cap [id number], was updated. Lack of substantial progress reviewed. Daughter to visit today.  
 
 
  
Critical care time 32 minutes More than 50% of the time documented was spent in face-to-face contact with the patient and in the care of the patient on the floor/unit where the patient is located. 
Alicja Reeder MD

## 2021-01-21 NOTE — PROGRESS NOTES
[facility] PROGRESS NOTE Follow up for: PRATIMA Subjective:  
Patient seen and examined on [facility] ICU unit trached on vent 60 %, 12 PEEP Labs and chart reviewed- no sign of renal recovery ROS: 
Unable to obtain Objective:  
Exam: 
Vitals:  
 01/21/21 1989 01/21/21 8404 01/21/21 8565 01/21/21 0831 BP: (!) 97/51 Pulse: (!) 54 Resp:    24 Temp:   97.7 °F (36.5 °C) SpO2: 100%   100% Weight:  76 kg (167 lb 8.8 oz) Height:      
 
PE: 
General: trached on Vent. Lung: rhonchi bilaterally CV; Regular rate, S1, S2, no Rub Abd: soft, non tender, + BS Ext: trace edema Access: right temp line-intact Intake/Output Summary (Last 24 hours) at 1/21/2021 9974 Last data filed at 1/21/2021 3542 Gross per 24 hour Intake 1230.72 ml Output 550 ml Net 680.72 ml Current Facility-Administered Medications Medication Dose Route Frequency  dexamethasone (DECADRON) 10 mg/mL injection 2 mg  2 mg IntraVENous Q24H  
 guanFACINE IR (TENEX) tablet 1 mg  1 mg Oral BID  risperiDONE (RisperDAL) 1 mg/mL oral solution soln 0.5 mg  0.5 mg Nasogastric BID  propofol (DIPRIVAN) 10 mg/mL infusion  0-50 mcg/kg/min IntraVENous TITRATE  fentaNYL citrate (PF) injection 100 mcg  100 mcg IntraVENous PRN  
 insulin glargine (LANTUS) injection 8 Units  8 Units SubCUTAneous QHS  DOPamine (INTROPIN) 800 mg in dextrose 5% 250 mL infusion  0-20 mcg/kg/min IntraVENous TITRATE  docusate (COLACE) 50 mg/5 mL oral liquid 100 mg  100 mg Per NG tube DAILY PRN  
 heparin (porcine) 1,000 unit/mL injection 5,000 Units  5,000 Units IntraVENous DIALYSIS PRN  
 insulin regular (NOVOLIN R, HUMULIN R) injection   SubCUTAneous Q4H  
 alcohol 62% (NOZIN) nasal  1 Ampule  1 Ampule Topical Q12H  
 NOREPINephrine (LEVOPHED) 4 mg in 5% dextrose 250 mL infusion  0.5-30 mcg/min IntraVENous TITRATE  
 0.9% sodium chloride infusion 250 mL  250 mL IntraVENous PRN  
  heparin (porcine) injection 5,000 Units  5,000 Units SubCUTAneous Q8H  
 midazolam in normal saline (VERSED) 1 mg/mL infusion  0-10 mg/hr IntraVENous TITRATE  fentaNYL in normal saline (pf) 25 mcg/mL infusion  0-200 mcg/hr IntraVENous TITRATE  dexmedeTOMidine (PRECEDEX) 400 mcg in 0.9% sodium chloride 100 mL infusion  0.1-1.5 mcg/kg/hr IntraVENous TITRATE  fentaNYL citrate (PF) injection 50 mcg  50 mcg IntraVENous Q2H PRN  
 bisacodyL (DULCOLAX) suppository 10 mg  10 mg Rectal DAILY PRN  
 NUTRITIONAL SUPPORT ELECTROLYTE PRN ORDERS   Does Not Apply PRN  
 [Held by provider] hydrALAZINE (APRESOLINE) tablet 10 mg  10 mg Per NG tube TID  famotidine (PF) (PEPCID) 20 mg in 0.9% sodium chloride 10 mL injection  20 mg IntraVENous DAILY  dextrose 40% (GLUTOSE) oral gel 1 Tube  15 g Oral PRN  
 glucagon (GLUCAGEN) injection 1 mg  1 mg IntraMUSCular PRN  
 dextrose (D50W) injection syrg 12.5-25 g  25-50 mL IntraVENous PRN  
 albuterol (PROVENTIL HFA, VENTOLIN HFA, PROAIR HFA) inhaler 2 Puff  2 Puff Inhalation Q4H PRN  
 hydrALAZINE (APRESOLINE) 20 mg/mL injection 20 mg  20 mg IntraVENous Q2H PRN  phenol throat spray (CHLORASEPTIC) 1 Spray  1 Spray Oral PRN  
 lip protectant (BLISTEX) ointment 1 Each  1 Each Topical PRN  
 acetaminophen (TYLENOL) tablet 650 mg  650 mg Oral Q6H PRN  
 sodium chloride (NS) flush 5-40 mL  5-40 mL IntraVENous Q8H  
 sodium chloride (NS) flush 5-40 mL  5-40 mL IntraVENous PRN  
 albuterol (PROVENTIL VENTOLIN) nebulizer solution 2.5 mg  2.5 mg Nebulization Q6H RT  
 
 
Recent Labs  
  01/21/21 
0325 01/20/21 
0251 01/19/21 
0255 WBC 12.7* 11.4* 11.6* HGB 7.9* 7.8* 8.4* HCT 26.0* 26.0* 27.0*  
 183 182 Recent Labs  
  01/21/21 
0325 01/20/21 
0251 01/19/21 
0255  139 140  
K 5.5* 5.2* 4.6  106 105 CO2 29 31 33* BUN 70* 53* 30* CREA 2.89* 2.54* 1.75* CA 9.1 8.6 8.8 * 120* 96 MG  --  2.3  --   
PHOS  --  5.5*  --   
 Assessment and Plan: 1. PRATIMA on CKD, oliguric COVID ATN , SLED today for volume and clearance 2. Hyperkalemia  Should improve with SLED  
  
3. COVID 19 +/acute respiratory failure/ARDs ventilated per pulm 4. Anemia transfuse hgb < 7.0 Morales Devries, NP

## 2021-01-22 NOTE — PROGRESS NOTES
[facility] PROGRESS NOTE Follow up for: PRATIMA Subjective:  
Patient seen and examined on 
 trached on vent 60 %, 12 PEEP Labs and chart reviewed- no sign of renal recovery ROS: 
Unable to obtain Objective:  
Exam: 
Vitals:  
 01/22/21 4790 01/22/21 9699 01/22/21 8635 01/22/21 6038 BP: (!) 103/57 (!) 140/67 (!) 119/57 Pulse: (!) 104 (!) 107 (!) 111 (!) 112 Resp:    24 Temp:      
SpO2: 100% 100% 100% 100% Weight:      
Height:      
 
PE: 
General: trached on Vent. Lung: rhonchi bilaterally CV; Regular rate, S1, S2, no Rub Abd: soft, non tender, + BS Ext: trace edema Access: right temp line-intact Intake/Output Summary (Last 24 hours) at 1/22/2021 2973 Last data filed at 1/22/2021 1142 Gross per 24 hour Intake 2271.87 ml Output 2718 ml Net -446.13 ml  
 
 
Current Facility-Administered Medications Medication Dose Route Frequency  NOREPINephrine (LEVOPHED) 4 mg in 5% dextrose 250 mL infusion  0.5-30 mcg/min IntraVENous TITRATE  dexamethasone (DECADRON) 10 mg/mL injection 2 mg  2 mg IntraVENous Q24H  
 guanFACINE IR (TENEX) tablet 1 mg  1 mg Oral BID  risperiDONE (RisperDAL) 1 mg/mL oral solution soln 0.5 mg  0.5 mg Nasogastric BID  insulin glargine (LANTUS) injection 8 Units  8 Units SubCUTAneous QHS  docusate (COLACE) 50 mg/5 mL oral liquid 100 mg  100 mg Per NG tube DAILY PRN  
 heparin (porcine) 1,000 unit/mL injection 5,000 Units  5,000 Units IntraVENous DIALYSIS PRN  
 insulin regular (NOVOLIN R, HUMULIN R) injection   SubCUTAneous Q4H  
 alcohol 62% (NOZIN) nasal  1 Ampule  1 Ampule Topical Q12H  
 0.9% sodium chloride infusion 250 mL  250 mL IntraVENous PRN  
 heparin (porcine) injection 5,000 Units  5,000 Units SubCUTAneous Q8H  
 fentaNYL in normal saline (pf) 25 mcg/mL infusion  0-200 mcg/hr IntraVENous TITRATE  dexmedeTOMidine (PRECEDEX) 400 mcg in 0.9% sodium chloride 100 mL infusion  0.1-1.5 mcg/kg/hr IntraVENous TITRATE  fentaNYL citrate (PF) injection 50 mcg  50 mcg IntraVENous Q2H PRN  
 bisacodyL (DULCOLAX) suppository 10 mg  10 mg Rectal DAILY PRN  
 NUTRITIONAL SUPPORT ELECTROLYTE PRN ORDERS   Does Not Apply PRN  
 [Held by provider] hydrALAZINE (APRESOLINE) tablet 10 mg  10 mg Per NG tube TID  famotidine (PF) (PEPCID) 20 mg in 0.9% sodium chloride 10 mL injection  20 mg IntraVENous DAILY  dextrose 40% (GLUTOSE) oral gel 1 Tube  15 g Oral PRN  
 glucagon (GLUCAGEN) injection 1 mg  1 mg IntraMUSCular PRN  
 dextrose (D50W) injection syrg 12.5-25 g  25-50 mL IntraVENous PRN  
 albuterol (PROVENTIL HFA, VENTOLIN HFA, PROAIR HFA) inhaler 2 Puff  2 Puff Inhalation Q4H PRN  
 hydrALAZINE (APRESOLINE) 20 mg/mL injection 20 mg  20 mg IntraVENous Q2H PRN  phenol throat spray (CHLORASEPTIC) 1 Spray  1 Spray Oral PRN  
 lip protectant (BLISTEX) ointment 1 Each  1 Each Topical PRN  
 acetaminophen (TYLENOL) tablet 650 mg  650 mg Oral Q6H PRN  
 sodium chloride (NS) flush 5-40 mL  5-40 mL IntraVENous Q8H  
 sodium chloride (NS) flush 5-40 mL  5-40 mL IntraVENous PRN  
 albuterol (PROVENTIL VENTOLIN) nebulizer solution 2.5 mg  2.5 mg Nebulization Q6H RT  
 
 
Recent Labs  
  01/22/21 
0320 01/21/21 
0325 01/20/21 
0251 WBC 14.2* 12.7* 11.4* HGB 7.8* 7.9* 7.8* HCT 26.3* 26.0* 26.0*  
 234 183 Recent Labs  
  01/22/21 
0320 01/21/21 
0325 01/20/21 
0251  141 139  
K 4.2 5.5* 5.2*  
 107 106 CO2 33* 29 31 BUN 36* 70* 53* CREA 1.49* 2.89* 2.54* CA 9.0 9.1 8.6 * 137* 120* MG 2.2  --  2.3 PHOS 2.8  --  5.5* Assessment and Plan: 1. PRATIMA on CKD, oliguric S/P SLED yesterday . Will do SLED again  In am .After that will reevaluate for renal recovery HD Rt IJ  temp line since 01/11 2.  Hyperkalemia  Should improve with SLED  
  
 3. COVID 19 +/acute respiratory failure/ARDs ventilated per pulm 4. Anemia transfuse hgb < 7.0 Sebastian Snider MD

## 2021-01-22 NOTE — PROGRESS NOTES
CM continues to follow for discharge planning and/or CM needs. No CM needs noted in pt chart or voiced at this time. Will continue to monitor and update as needed.

## 2021-01-22 NOTE — PROGRESS NOTES
[facility] COVID-19 Note: 
 
Critical Care Note: 1/22/2021 Stephan [place] Admission Date: 12/24/2020     Length of Stay: 29 days Background: 78 y.o. y/o female with acute hypoxemic respiratory failure secondary to COVID-19, s/p prox XLT Shiley cuffed trach on 1/19 Onset of COVID-19 symptoms: 12/24 Notable PMH: DM, CAD, HTN, CKD 
 
24 Hour events:  
 
Weaned to 50% with sat %. ON PCV 15 with PEEP 12. Eyes open but not interactive. Off precedex and on less fentanyl. ROS: trached, sedated Lines: (insertion date) Quad Lumen 12/30/20 Anterior; Left Internal jugular Orogastric Tube 12/30/20 Urinary Catheter 01/05/21 [place] Airway - Trach 1/19. Size 6 prox XLT cuffed Shiley Wound Wrist Left;Dorsal 12/31/20 Wound Ankle Anterior Drips: current dose (range) Fentanyl 50 Visit Vitals BP (!) 152/70 Pulse (!) 110 Temp 99.7 °F (37.6 °C) Resp 24 Ht 5' 5\" (1.651 m) Wt 167 lb 8.8 oz (76 kg) SpO2 99% BMI 27.88 kg/m² Pertinent Exam:           
Constitutional:  trached and mechanically ventilated. EENMT:  Sclera clear, pupils equal, oral mucosa moist 
Respiratory: coarse with rhonchi; using accessory muscles at times. Cardiovascular:  RRR Gastrointestinal:  soft with no tenderness; positive bowel sounds present Musculoskeletal:  warm with no cyanosis, no lower extremity edema Skin:  no jaundice or ecchymosis Neurologic: minimally responsive Psychiatric: sedated CXR Pertinent Labs:  
Recent Labs  
  01/22/21 
0320 01/21/21 
0325 01/20/21 
0251 WBC 14.2* 12.7* 11.4* HGB 7.8* 7.9* 7.8* HCT 26.3* 26.0* 26.0*  
 234 183 Recent Labs  
  01/22/21 
0320 01/21/21 
0325 01/20/21 
0251  141 139  
K 4.2 5.5* 5.2*  
 107 106 CO2 33* 29 31 * 137* 120* BUN 36* 70* 53* CREA 1.49* 2.89* 2.54* MG 2.2  --  2.3 CA 9.0 9.1 8.6 PHOS 2.8  --  5.5* Recent Labs  
  01/22/21 
1222 01/22/21 8527 01/22/21 
5927 GLUCPOC 313* 278* 182* MICRO Date Source Result 1/5 blood NG  
1/5 blood NG  
1/5 sputum Heavy MRSA  
1/5 urine 11K c. glabrata COVID-19 Meds: 
 
Dexamethasone 6mg daily (1/8-1/18) Anti-infectives (start date): 
None current Ventilator Settings:  5' 5\" (1.651 m) Mode FIO2 Rate Tidal Volume Pressure PEEP Pressure control  50 %    400 ml  12 cm H2O  12 cm H20 Peak airway pressure: 28 cm H2O Minute ventilation: 13 l/min ABG:  
Recent Labs  
  01/22/21 
0349 01/21/21 
0256 01/20/21 
0409 PHI 7.35 7.29* 7.31* PCO2I 54.6* 57.7* 56.9*  
PO2I 78 89 79 HCO3I 30.4* 27.8* 28.5* Plan:  (Medical Decision Making) 1  NEURO:  
1. Sedation: off 2. Analgesia: wean fentanyl as able 3. Delirium:  ongoing CV: 1. Volume Status:  RRT 2. Hypotension: BP marginal but off pressors PULM:  
1. Acute hypoxemic/hypercapneic respiratory failure:  Continue low tidal volume ventilation. Wean FIO2 as able on PCV. Try to decrease PEEP. 2. Severe ARDS 2/2 COVID: Low Tidal Volume ventilation, goal 6cc per kg (Ideal body weight: 57 kg (125 lb 10.6 oz) x 6 = 348ml). Attempt to limit driving pressure to 05DD/G2S or less. Allow permissive hypercapnia/acidosis to pH 7.25 if needed  to achieve above. RENAL: 
1. PRATIMA: per renal 
GI: NPO, PPI prophy 1. Nutrition: continue TF 
HEME: no acute issues 1. Thrombocytopenia: resolved.  HIT negative 2. Anticoagulation: d-dimer to determine anticoagulation down to 2.54. Had been over 7. Continue heparin ID: WBC elevated, afebrile. 1. COVID-19: COVID meds as above; taper off decadron 2. MRSA Pneumonia: Completed vanco 
ENDO: 
1. DM: decrease lantus as steroids tapered. Skin: no decub, turns, preventive care Prophy: Heparin/H2B 
-- 
 DNR, Ridge Hawkins [phone], was updated. Lack of substantial progress reviewed. Discussed poor prognosis and daughter does not want to prolong suffering. Will not place back pressors and daughter agreeable.  If no progress over the weekend, consult PC for possible withdrawal of support. 
 
 
  
 
 
More than 50% of the time documented was spent in face-to-face contact with the patient and in the care of the patient on the floor/unit where the patient is located. 
  
 
Sanjuana Medel MD

## 2021-01-22 NOTE — PROGRESS NOTES
While passing through lobby, noted that granddaughter was coming to visit patient and appeared to be very distraught. [place] that she was going to pt's room and located granddaughter in pt's room -[id number]. Pt's nurse, Sharon Byrd, was talking with granddaughter to provide update. Granddaughter was very tearful and concerned that \"they were going to pull the plug. \" 
RN explained that there was no plan at the present to transfer to comfort care, but did emphasize that the pt is very sick.  conveyed care/concern, remaining with RN while she was talking with granddaughter. Granddaughter did not remain long. Chaplains to be available as needed, to provide spiritual/emotional support to pt's family. Velma Mack MDiv, [facility]

## 2021-01-22 NOTE — PROGRESS NOTES
Palliative Care Progress Note Patient: Clayton Vargas MRN: [id number]  SSN: [id number] YOB: 1940  Age: 78 y.o. Sex: female Assessment/Plan: Chief Complaint/Interval History: eyes open but does not respond. Remains ventilated via trach Principal Diagnosis:   
· Encephalopathy, Unspecified  G93.40 Additional Diagnoses: · Acute Respiratory Failure, Unspecified  J96.00 · Debility, Unspecified  R53.81 
· Failure to Thrive  R62.7 · Fatigue, Lethargy  R53.83 
· Frailty  R54 · Counseling, Encounter for Medical Advice  Z71.9 
· Encounter for Palliative Care  Z51.5 Palliative Performance Scale (PPS) Medical Decision Making:  
Reviewed and summarized notes over last week Discussed case with appropriate providersDarlin Goodwin RN Reviewed laboratory and x-ray data over last week Pt resting in bed, eyes open but not interactive. She remains ventilated via trach. Discussed with Heri Crawley RN. Pt has made very little progress during her hospitalization, and her prognosis is quite poor for meaningful recovery. Per discussions with daughter at beginning of hospitalization, she would re-evaluate goals if pt failed to improve. Dr Renay Robin has spoken with daughter today, and she has made pt a DNR. If there is no improvement over the weekend, likely need to progress to comfort measures. We will continue to follow. 1500-  Spoke with a daughter of pt, Marquis Blanco, and granddaughter at bedside. Family very upset, stating Allenyara Christina (daughter) had told them to come say goodbye to pt. Updated on pt's condition, and although she is doing poorly, she is not unstable at this time. Family present remain hopeful that pt will improve. Marquis Blanco spoke of her sonia in [place] plan for pt. Provided support and assured them of our ongoing care.  
 
Will discuss findings with members of the interdisciplinary team.   
 
  
 More than 50% of this 35 minute visit was spent counseling and coordination of care as outlined above. Subjective:  
 
Review of Systems: 
Review of systems not obtained due to patient factors- AMS Objective:  
 
Visit Vitals BP (!) 119/57 Pulse (!) 112 Temp 99 °F (37.2 °C) Resp 24 Ht 5' 5\" (1.651 m) Wt 167 lb 8.8 oz (76 kg) SpO2 100% BMI 27.88 kg/m² Physical Exam: 
 
General:  Poorly responsive. Debilitated and frail Eyes:  Conjunctivae/corneas clear Nose: Nares normal. Septum midline. Neck: Supple, symmetrical, trachea midline. Trach with vent Lungs:   Coarse bilaterally, unlabored Heart:  Regular rate and rhythm Abdomen:   Soft, non-tender, non-distended Extremities: Normal, atraumatic, no cyanosis or edema Skin: Skin color, texture, turgor normal  
Neurologic: Does not follow commands Psych: Alert and oriented Signed By: Luan Sinclair NP   
 January 22, 2021

## 2021-01-22 NOTE — PROGRESS NOTES
Ventilator check complete; patient has a #6.0 tracheostomy. Patient is sedated. Patient is not able to follow commands. Breath sounds are coarse and diminished. Trachea is midline, Negative for subcutaneous air, and chest excursion is symmetric. Patient is also Negative for cyanosis and is Negative for pitting edema. All alarms are set and audible. Resuscitation bag is at the head of the bed. Ventilator Settings Mode FIO2 Rate Tidal Volume Pressure PEEP I:E Ratio Pressure Control  50 % 28    12 cm H2O  12 cm H20  1:1.7 Peak airway pressure: 28 cm H2O Minute ventilation: 15.2 l/min ABG: No results for input(s): PH, PCO2, PO2, HCO3 in the last 72 hours.  
 
 
Jose J Wade, RT

## 2021-01-23 NOTE — PROGRESS NOTES
[facility] COVID-19 Note: 
 
Critical Care Note: 1/23/2021 Laura [facility] Admission Date: 12/24/2020     Length of Stay: 30 days Background: 78 y.o. y/o female with acute hypoxemic respiratory failure secondary to COVID-19, s/p prox XLT Shiley cuffed trach on 1/19 Onset of COVID-19 symptoms: 12/24 Notable PMH: DM, CAD, HTN, CKD 
 
24 Hour events:  
 
Weaned to 50% with sat %. ON PCV 15 with PEEP 12. Eyes open but not interactive. Off precedex and on less fentanyl. ROS: trached, sedated Lines: (insertion date) Quad Lumen 12/30/20 Anterior; Left Internal jugular Orogastric Tube 12/30/20 Urinary Catheter 01/05/21 [place] Airway - Trach 1/19. Size 6 prox XLT cuffed Shiley Wound Wrist Left;Dorsal 12/31/20 Wound Ankle Anterior Drips: current dose (range) Fentanyl 50 Visit Vitals BP (!) 165/72 Pulse 87 Temp 97.1 °F (36.2 °C) Resp 26 Ht 5' 5\" (1.651 m) Wt 170 lb 3.1 oz (77.2 kg) SpO2 100% BMI 28.32 kg/m² Pertinent Exam:           
Constitutional:  trached and mechanically ventilated. EENMT:  Sclera clear, pupils equal, oral mucosa moist 
Respiratory: coarse with rhonchi; using accessory muscles at times. Cardiovascular:  RRR Gastrointestinal:  soft with no tenderness; positive bowel sounds present Musculoskeletal:  warm with no cyanosis, no lower extremity edema Skin:  no jaundice or ecchymosis Neurologic: minimally responsive Psychiatric: sedated CXR Pertinent Labs:  
Recent Labs  
  01/23/21 
0307 01/22/21 
0320 01/21/21 
0325 WBC 13.9* 14.2* 12.7* HGB 7.6* 7.8* 7.9*  
HCT 25.4* 26.3* 26.0*  
 255 234 Recent Labs  
  01/23/21 
0307 01/22/21 
0320 01/21/21 
0325  139 141  
K 4.3 4.2 5.5*  
 103 107 CO2 32 33* 29 * 182* 137* BUN 70* 36* 70* CREA 2.43* 1.49* 2.89* MG  --  2.2  --   
CA 9.2 9.0 9.1 PHOS  --  2.8  --   
 
Recent Labs  
  01/23/21 
5274 01/23/21 3457 01/22/21 
2335 GLUCPOC 172* 167* 215* MICRO Date Source Result 1/5 blood NG  
1/5 blood NG  
1/5 sputum Heavy MRSA  
1/5 urine 11K c. glabrata COVID-19 Meds: 
 
Dexamethasone 6mg daily (1/8-1/18) Anti-infectives (start date): 
None current Ventilator Settings:  5' 5\" (1.651 m) Mode FIO2 Rate Tidal Volume Pressure PEEP Pressure control  50 %    400 ml  12 cm H2O  12 cm H20 Peak airway pressure: 28 cm H2O Minute ventilation: 15.3 l/min ABG:  
Recent Labs  
  01/23/21 
0331 01/22/21 
0349 01/21/21 
0256 PHI 7.33* 7.35 7.29* PCO2I 59.4* 54.6* 57.7*  
PO2I 87 78 89 HCO3I 31.3* 30.4* 27.8* Plan:  (Medical Decision Making) 1  NEURO:  
1. Sedation: off 2. Analgesia: wean fentanyl as able 3. Delirium:  ongoing CV: 1. Volume Status:  RRT 2. Hypotension: BP marginal but off pressors PULM:  
1. Acute hypoxemic/hypercapneic respiratory failure:  Continue low tidal volume ventilation. Wean FIO2 as able on PCV. Try to decrease PEEP. 2. Severe ARDS 2/2 COVID: Low Tidal Volume ventilation, goal 6cc per kg (Ideal body weight: 57 kg (125 lb 10.6 oz) x 6 = 348ml). Attempt to limit driving pressure to 88WJ/E7Y or less. Allow permissive hypercapnia/acidosis to pH 7.25 if needed  to achieve above. RENAL: 
1. PRATIMA: per renal 
GI: NPO, PPI prophy 1. Nutrition: continue TF 
HEME: no acute issues 1. Thrombocytopenia: resolved.  HIT negative 2. Anticoagulation: d-dimer to determine anticoagulation down to 2.54. Had been over 7. Continue heparin ID: WBC elevated, afebrile. 1. COVID-19: COVID meds as above; taper off decadron 2. MRSA Pneumonia: Completed vanco 
ENDO: 
1. DM: decrease lantus as steroids tapered. Skin: no decub, turns, preventive care NAHOMIVCNNA: MARTA/BERONICA Maintain current plan at this time. Family dynamics may be challenging if care trajectory changes to comfort measures. Appreciate PC assistance. Need to determine if HCPOA designation/documents exist.  
 
More than 50% of the time documented was spent in face-to-face contact with the patient and in the care of the patient on the floor/unit where the patient is located. 
  
 
Neela Lopez NP Lungs: scattered rhonchi Heart:  RRR with no Murmur/Rubs/Gallops Additional Comments:  Currently on HD. Looks terrible and not awake or interactive. Apparently poor family dynamics about ongoing intensity of care. HCPOA coming to visit this afternoon. I have examined the patient. I agree with the above assessment and plan as documented.  
 
David Bauer MD

## 2021-01-23 NOTE — PROGRESS NOTES
[facility] PROGRESS NOTE 
 
Follow up for: PRATIMA  
 
Subjective:  
Patient seen and examined on 
 trached on vent 60 %, 12 PEEP 
Labs and chart reviewed- no sign of renal recovery  
 
ROS: 
Unable to obtain 
 
Objective:  
Exam: 
Vitals:  
 01/23/21 0616 01/23/21 0646 01/23/21 0814 01/23/21 0928  
BP: (!) 149/65 (!) 170/74  (!) 165/72  
Pulse: 78 92 (!) 105 87  
Resp: (!) 36 26 26   
Temp:      
SpO2: 100% 98% 100%   
Weight:      
Height:      
 
PE: 
General: trached on Vent. 
Lung: rhonchi bilaterally  
CV; Regular rate, S1, S2, no Rub  
Abd: soft, non tender, + BS  
Ext: trace edema  
Access: right temp line-intact  
 
Intake/Output Summary (Last 24 hours) at 1/23/2021 0945 
Last data filed at 1/23/2021 0928 
Gross per 24 hour  
Intake 2120.07 ml  
Output 375 ml  
Net 1745.07 ml  
 
 
Current Facility-Administered Medications  
Medication Dose Route Frequency  
• [Held by provider] guanFACINE IR (TENEX) tablet 1 mg  1 mg Oral BID  
• risperiDONE (RisperDAL) 1 mg/mL oral solution soln 0.5 mg  0.5 mg Nasogastric BID  
• insulin glargine (LANTUS) injection 8 Units  8 Units SubCUTAneous QHS  
• docusate (COLACE) 50 mg/5 mL oral liquid 100 mg  100 mg Per NG tube DAILY PRN  
• heparin (porcine) 1,000 unit/mL injection 5,000 Units  5,000 Units IntraVENous DIALYSIS PRN  
• insulin regular (NOVOLIN R, HUMULIN R) injection   SubCUTAneous Q4H  
• alcohol 62% (NOZIN) nasal  1 Ampule  1 Ampule Topical Q12H  
• 0.9% sodium chloride infusion 250 mL  250 mL IntraVENous PRN  
• heparin (porcine) injection 5,000 Units  5,000 Units SubCUTAneous Q8H  
• fentaNYL in normal saline (pf) 25 mcg/mL infusion  0-200 mcg/hr IntraVENous TITRATE  
• dexmedeTOMidine (PRECEDEX) 400 mcg in 0.9% sodium chloride 100 mL infusion  0.1-1.5 mcg/kg/hr IntraVENous TITRATE  
• fentaNYL citrate (PF) injection 50 mcg  50 mcg IntraVENous Q2H PRN  
• bisacodyL (DULCOLAX) suppository 10 mg  10 mg Rectal DAILY PRN  
  NUTRITIONAL SUPPORT ELECTROLYTE PRN ORDERS   Does Not Apply PRN  
 [Held by provider] hydrALAZINE (APRESOLINE) tablet 10 mg  10 mg Per NG tube TID  famotidine (PF) (PEPCID) 20 mg in 0.9% sodium chloride 10 mL injection  20 mg IntraVENous DAILY  dextrose 40% (GLUTOSE) oral gel 1 Tube  15 g Oral PRN  
 glucagon (GLUCAGEN) injection 1 mg  1 mg IntraMUSCular PRN  
 dextrose (D50W) injection syrg 12.5-25 g  25-50 mL IntraVENous PRN  
 albuterol (PROVENTIL HFA, VENTOLIN HFA, PROAIR HFA) inhaler 2 Puff  2 Puff Inhalation Q4H PRN  
 hydrALAZINE (APRESOLINE) 20 mg/mL injection 20 mg  20 mg IntraVENous Q2H PRN  phenol throat spray (CHLORASEPTIC) 1 Spray  1 Spray Oral PRN  
 lip protectant (BLISTEX) ointment 1 Each  1 Each Topical PRN  
 acetaminophen (TYLENOL) tablet 650 mg  650 mg Oral Q6H PRN  
 sodium chloride (NS) flush 5-40 mL  5-40 mL IntraVENous Q8H  
 sodium chloride (NS) flush 5-40 mL  5-40 mL IntraVENous PRN  
 albuterol (PROVENTIL VENTOLIN) nebulizer solution 2.5 mg  2.5 mg Nebulization Q6H RT  
 
 
Recent Labs  
  01/23/21 
0307 01/22/21 
0320 01/21/21 
0325 WBC 13.9* 14.2* 12.7* HGB 7.6* 7.8* 7.9*  
HCT 25.4* 26.3* 26.0*  
 255 234 Recent Labs  
  01/23/21 
0307 01/22/21 
0320 01/21/21 
0325  139 141  
K 4.3 4.2 5.5*  
 103 107 CO2 32 33* 29 BUN 70* 36* 70* CREA 2.43* 1.49* 2.89* CA 9.2 9.0 9.1 * 182* 137* MG  --  2.2  --   
PHOS  --  2.8  -- Assessment and Plan: 1. PRATIMA on CKD, oliguric SLED today   . After that will reevaluate for renal recovery HD Rt IJ  temp line since 01/11 2. Hyperkalemia  Should improve with SLED  
  
3. COVID 19 +/acute respiratory failure/ARDs ventilated per pulm 4. Anemia transfuse hgb < 7.0 Willie Perera MD

## 2021-01-23 NOTE — DIALYSIS
8 HR SLED initiated using  R IJ. Aspirated and flushed both catheter ports without problem. Machine settings per MD order. 150  DFR  300 UFR Heparin 3000 unit bolus and 500 ml/hr. Reported to primary nurse. Dialysis nurse available as needed.

## 2021-01-23 NOTE — PROGRESS NOTES
Ventilator check complete; patient has a #6 tracheostomy. Patient is sedated. Patient is not able to follow commands. Breath sounds are diminished. Trachea is midline, Negative for subcutaneous air, and chest excursion is symmetric. Patient is also Negative for cyanosis and is Negative for pitting edema. All alarms are set and audible. Resuscitation bag is at the head of the bed. Ventilator Settings Mode FIO2 Rate Tidal Volume Pressure PEEP I:E Ratio Pressure control  50 %    400 ml  12 cm H2O  12 cm H20  1:1.68 Peak airway pressure: 28 cm H2O Minute ventilation: 15.3 l/min ABG: No results for input(s): PH, PCO2, PO2, HCO3 in the last 72 hours.  
 
 
Robyn Barnes, RT

## 2021-01-23 NOTE — PROGRESS NOTES
Bedside, Verbal and Written shift change report given to Himanshu Mccall RN (oncoming nurse) by Magalie Saez RN (offgoing nurse). Report included the following information SBAR, Kardex, ED Summary, OR Summary, Procedure Summary, Intake/Output, MAR, Recent Results, Med Rec Status, Cardiac Rhythm SR, Alarm Parameters  and Quality Measures. Patient turned and dual verification of fentanyl gtt completed in [facility]

## 2021-01-23 NOTE — PROGRESS NOTES
Ventilator check complete; patient has a #6XLT tracheostomy. Patient is sedated. Patient is not able to follow commands. Breath sounds are coarse. Trachea is midline, Negative for subcutaneous air, and chest excursion is symmetric. Patient is also Negative for cyanosis and is Positive for pitting edema. All alarms are set and audible. Resuscitation bag is at the head of the bed. Ventilator Settings Mode FIO2 Rate Tidal Volume Pressure PEEP I:E Ratio Pressure control  50 %   28 400 ml  12 cm H2O  12 cm H20  1:1.68 Peak airway pressure: 28 cm H2O Minute ventilation: 12.7 l/min Adriana Blevins, RT

## 2021-01-23 NOTE — DIALYSIS
CRRT clotted. Blood returned.   Dr. Tyrus Ganser notified with orders not to restart,  Will re evaluate in the am.

## 2021-01-24 NOTE — PROGRESS NOTES
Ventilator check complete; patient has a #6.0 tracheostomy. Patient is sedated. Patient is not able to follow commands. Breath sounds are coarse. Trachea is midline, Negative for subcutaneous air, and chest excursion is symmetric. Patient is also Negative for cyanosis and is Negative for pitting edema. All alarms are set and audible. Resuscitation bag is at the head of the bed. Ventilator Settings Mode FIO2 Rate Tidal Volume Pressure PEEP I:E Ratio Assist control, Pressure control  50 %     15  12 cm H20  1:1.68 Peak airway pressure: 28 cm H2O Minute ventilation: 10.6 l/min ABG: No results for input(s): PH, PCO2, PO2, HCO3 in the last 72 hours.  
 
 
Nalini Welch, RT

## 2021-01-24 NOTE — PROGRESS NOTES
[facility] PROGRESS NOTE Follow up for: PRATIMA Subjective:  
Patient seen and examined on 
 trached on vent 60 %, 12 PEEP Labs and chart reviewed- no sign of renal recovery ROS: 
Unable to obtain Objective:  
Exam: 
Vitals:  
 01/24/21 8747 01/24/21 7211 01/24/21 2756 01/24/21 9075 BP: (!) 160/71 Pulse: 93  (!) 104 Resp: (!) 36  (!) 32 Temp:  98.5 °F (36.9 °C) SpO2: 100%  100% 97% Weight:      
Height:      
 
PE: 
General: trached on Vent. Lung: rhonchi bilaterally CV; Regular rate, S1, S2, no Rub Abd: soft, non tender, + BS Ext: trace edema Access: right temp line-intact Intake/Output Summary (Last 24 hours) at 1/24/2021 1005 Last data filed at 1/24/2021 6560 Gross per 24 hour Intake 1256 ml Output 1950 ml Net -694 ml Current Facility-Administered Medications Medication Dose Route Frequency  [Held by provider] guanFACINE IR (TENEX) tablet 1 mg  1 mg Oral BID  risperiDONE (RisperDAL) 1 mg/mL oral solution soln 0.5 mg  0.5 mg Nasogastric BID  insulin glargine (LANTUS) injection 8 Units  8 Units SubCUTAneous QHS  docusate (COLACE) 50 mg/5 mL oral liquid 100 mg  100 mg Per NG tube DAILY PRN  
 heparin (porcine) 1,000 unit/mL injection 5,000 Units  5,000 Units IntraVENous DIALYSIS PRN  
 insulin regular (NOVOLIN R, HUMULIN R) injection   SubCUTAneous Q4H  
 alcohol 62% (NOZIN) nasal  1 Ampule  1 Ampule Topical Q12H  
 0.9% sodium chloride infusion 250 mL  250 mL IntraVENous PRN  
 heparin (porcine) injection 5,000 Units  5,000 Units SubCUTAneous Q8H  
 fentaNYL in normal saline (pf) 25 mcg/mL infusion  0-200 mcg/hr IntraVENous TITRATE  fentaNYL citrate (PF) injection 50 mcg  50 mcg IntraVENous Q2H PRN  
 bisacodyL (DULCOLAX) suppository 10 mg  10 mg Rectal DAILY PRN  
 NUTRITIONAL SUPPORT ELECTROLYTE PRN ORDERS   Does Not Apply PRN  
 [Held by provider] hydrALAZINE (APRESOLINE) tablet 10 mg  10 mg Per NG tube TID  famotidine (PF) (PEPCID) 20 mg in 0.9% sodium chloride 10 mL injection  20 mg IntraVENous DAILY  dextrose 40% (GLUTOSE) oral gel 1 Tube  15 g Oral PRN  
 glucagon (GLUCAGEN) injection 1 mg  1 mg IntraMUSCular PRN  
 dextrose (D50W) injection syrg 12.5-25 g  25-50 mL IntraVENous PRN  
 albuterol (PROVENTIL HFA, VENTOLIN HFA, PROAIR HFA) inhaler 2 Puff  2 Puff Inhalation Q4H PRN  
 hydrALAZINE (APRESOLINE) 20 mg/mL injection 20 mg  20 mg IntraVENous Q2H PRN  phenol throat spray (CHLORASEPTIC) 1 Spray  1 Spray Oral PRN  
 lip protectant (BLISTEX) ointment 1 Each  1 Each Topical PRN  
 acetaminophen (TYLENOL) tablet 650 mg  650 mg Oral Q6H PRN  
 sodium chloride (NS) flush 5-40 mL  5-40 mL IntraVENous Q8H  
 sodium chloride (NS) flush 5-40 mL  5-40 mL IntraVENous PRN  
 albuterol (PROVENTIL VENTOLIN) nebulizer solution 2.5 mg  2.5 mg Nebulization Q6H RT  
 
 
Recent Labs  
  01/24/21 
0331 01/23/21 
0307 01/22/21 
0320 WBC 15.2* 13.9* 14.2* HGB 7.4* 7.6* 7.8* HCT 24.3* 25.4* 26.3*  
 253 255 Recent Labs  
  01/24/21 
0331 01/23/21 
0307 01/22/21 
0320  139 139  
K 4.2 4.3 4.2  104 103 CO2 34* 32 33* BUN 52* 70* 36* CREA 2.02* 2.43* 1.49* CA 9.0 9.2 9.0  
* 161* 182* MG  --   --  2.2 PHOS  --   --  2.8 Assessment and Plan: 1. PRATIMA on CKD, oliguric SLED yesterday 1/23 will  reevaluate for renal recovery .  Will follow renal indicis on Monday to decide on need for dialysis , if not will evaluate for renal recovery HD Rt IJ  temp line since 01/11 2. Hyperkalemia  Should improve with SLED  
  
3. COVID 19 +/acute respiratory failure/ARDs ventilated per pulm 4. Anemia transfuse hgb < 7.0 Tom Crawford MD

## 2021-01-24 NOTE — PROGRESS NOTES
Bedside, Verbal and Written shift change report given to Alberto Miner RN (oncoming nurse) by Nelida Zamora RN (offgoing nurse). Report included the following information SBAR, Kardex, Procedure Summary, Intake/Output, MAR, Cardiac Rhythm SR, Alarm Parameters  and Quality Measures. Fentanyl gtt rate dual verified in the [facility]

## 2021-01-24 NOTE — PROGRESS NOTES
[facility] COVID-19 Note: 
 
Critical Care Note: 1/24/2021 Anish Mayers Admission Date: 12/24/2020     Length of Stay: 31 days Background: 78 y.o. y/o female with acute hypoxemic respiratory failure secondary to COVID-19, s/p prox XLT Shiley cuffed trach on 1/19 Onset of COVID-19 symptoms: 12/24 Notable PMH: DM, CAD, HTN, CKD 
 
24 Hour events:  
 
Ran SLED yesterday, unsure if running today. She clotted off after 5 hours yesterday. No significant change since yesterday. ROS: trached, on fentanyl Lines: (insertion date) Quad Lumen 12/30/20 Anterior; Left Internal jugular Orogastric Tube 12/30/20 Urinary Catheter 01/05/21 [place] Airway - Trach 1/19. Size 6 prox XLT cuffed Shiley Wound Wrist Left;Dorsal 12/31/20 Wound Ankle Anterior Drips: current dose (range) Fentanyl Visit Vitals BP (!) 160/71 Pulse (!) 104 Temp 98.5 °F (36.9 °C) Resp (!) 32 Ht 5' 5\" (1.651 m) Wt 163 lb 12.8 oz (74.3 kg) SpO2 97% BMI 27.26 kg/m² Pertinent Exam:           
Constitutional:  trached and mechanically ventilated. EENMT:  Sclera clear, pupils equal, oral mucosa moist 
Respiratory: coarse with rhonchi; using accessory muscles at times. Cardiovascular:  RRR Gastrointestinal:  soft with no tenderness; positive bowel sounds present Musculoskeletal:  warm with no cyanosis, no lower extremity edema Skin:  no jaundice or ecchymosis Neurologic: minimally responsive Psychiatric: sedated CXR 
01/24/2021 01/19/2021 Pertinent Labs:  
Recent Labs  
  01/24/21 
0331 01/23/21 
0307 01/22/21 
0320 WBC 15.2* 13.9* 14.2* HGB 7.4* 7.6* 7.8* HCT 24.3* 25.4* 26.3*  
 253 255 Recent Labs  
  01/24/21 
0331 01/23/21 
0307 01/22/21 
0320  139 139  
K 4.2 4.3 4.2  104 103 CO2 34* 32 33* * 161* 182* BUN 52* 70* 36* CREA 2.02* 2.43* 1.49* MG  --   --  2.2 CA 9.0 9.2 9.0 PHOS  --   --  2.8 Recent Labs  
  01/24/21 
0800 01/24/21 
0414 01/24/21 
0010 GLUCPOC 166* 169* 179* MICRO Date Source Result 1/5 blood NG  
1/5 blood NG  
1/5 sputum Heavy MRSA  
1/5 urine 11K c. Gl[place]rata; 4000 candida krusei COVID-19 Meds: 
 
Dexamethasone 6mg daily (1/8-1/18) Anti-infectives (start date): 
None current Ventilator Settings:  5' 5\" (1.651 m) Mode FIO2 Rate Tidal Volume Pressure PEEP Assist control, Pressure control  40 %    400 ml  12 cm H2O  12 cm H20 Peak airway pressure: 28 cm H2O Minute ventilation: 10.6 l/min ABG:  
Recent Labs  
  01/24/21 
0327 01/23/21 
0331 01/22/21 
0349 PHI 7.34* 7.33* 7.35  
PCO2I 61.1* 59.4* 54.6*  
PO2I 99 87 78 HCO3I 32.9* 31.3* 30.4* Plan:  (Medical Decision Making) 1  NEURO:  
1. Sedation: off 2. Analgesia: wean fentanyl as able 3. Delirium:  ongoing CV: 1. Volume Status:  RRT 2. Hypotension: BP marginal but off pressors PULM:  
1. Acute hypoxemic/hypercapneic respiratory failure:  Continue low tidal volume ventilation. Wean FIO2 as able on PCV. Try to decrease PEEP. 2. Severe ARDS 2/2 COVID: Low Tidal Volume ventilation, goal 6cc per kg (Ideal body weight: 57 kg (125 lb 10.6 oz) x 6 = 348ml). Attempt to limit driving pressure to 54KA/J4O or less. Allow permissive hypercapnia/acidosis to pH 7.25 if needed  to achieve above. RENAL: 
1. PRATIMA: per renal 
GI: NPO, PPI prophy 1. Nutrition: continue TF 
HEME: no acute issues 1. Thrombocytopenia: resolved.  HIT negative 2. Anticoagulation: d-dimer to determine anticoagulation down to 2.54. Had been over 7. Continue heparin ID: WBC elevated, afebrile. 1. COVID-19: COVID meds as above; decadron completed 2. MRSA Pneumonia: Completed vanco 
ENDO: 
1. DM:cont current lantus dose lantus Skin: no decub, turns, preventive care [facility]: SKEHI/D HCPOA came in yesterday, per RN wants to cont current treatment plan. Family dynamics may be challenging if care trajectory changes to comfort measures. Appreciate PC assistance. Need to determine if HCPOA designation/documents exist.  
 
More than 50% of the time documented was spent in face-to-face contact with the patient and in the care of the patient on the floor/unit where the patient is located. 
  
 
Annita Guerrero NP Lungs:  Few scattered rhonchi Heart:  RRR with no Murmur/Rubs/Gallops Neuro: calm on fentanyl. Additional Comments: Oxygenation a little better. Will try to drop PEEP. Has done poorly with severe tachypnea when fentanyl dropped. Spoke with daughter about failure to improve meaningfully. Try to drop PEEP to 10. Will get PC back involved tomorrow. I have spoken with and examined the patient. I agree with the above assessment and plan as documented.  
 
Ita Brooks MD

## 2021-01-25 PROBLEM — N30.00 ACUTE CYSTITIS WITHOUT HEMATURIA: Status: RESOLVED | Noted: 2020-01-01 | Resolved: 2021-01-01

## 2021-01-25 PROBLEM — N17.9 ACUTE RENAL FAILURE (ARF) (HCC): Status: RESOLVED | Noted: 2019-08-08 | Resolved: 2021-01-01

## 2021-01-25 PROBLEM — Z91.199 NONCOMPLIANCE: Chronic | Status: ACTIVE | Noted: 2020-03-08

## 2021-01-25 PROBLEM — T68.XXXA HYPOTHERMIA: Status: RESOLVED | Noted: 2020-03-08 | Resolved: 2021-01-01

## 2021-01-25 PROBLEM — E86.0 DEHYDRATION: Status: RESOLVED | Noted: 2020-03-08 | Resolved: 2021-01-01

## 2021-01-25 PROBLEM — E87.20 METABOLIC ACIDOSIS: Status: RESOLVED | Noted: 2020-03-08 | Resolved: 2021-01-01

## 2021-01-25 NOTE — PROGRESS NOTES
Ventilator check complete; patient has a #6XLT tracheostomy. Patient is not sedated. Patient is not able to follow commands. Breath sounds are coarse. Trachea is midline, Negative for subcutaneous air, and chest excursion is symmetric. Patient is also Negative for cyanosis and is Negative for pitting edema. All alarms are set and audible. Resuscitation bag is at the head of the bed. Ventilator Settings Mode FIO2 Rate Tidal Volume Pressure PEEP I:E Ratio Pressure control  40 %   28 
 400 ml  12 cm H2O  10 cm H20  1:1.68 Peak airway pressure: 26 cm H2O Minute ventilation: 10.7 l/min Calleen Kussmaul, RT

## 2021-01-25 NOTE — CONSULTS
This is a duplicate consult. We are currently following pt. Please see last progress noted from 1/22/21. [facility]

## 2021-01-25 NOTE — PROGRESS NOTES
[facility] PROGRESS NOTE Follow up for: PRATIMA Subjective:  
Patient seen and examined. Trached on vent. Not interactive ROS: 
Unable to obtain Objective:  
Exam: 
Vitals:  
 01/25/21 8065 01/25/21 2849 01/25/21 7661 01/25/21 2161 BP:  (!) 141/64  (!) 147/67 Pulse: (!) 108 (!) 106 (!) 107 (!) 123 Resp: 24 (!) 39 30 28 Temp:      
SpO2: 98% 95% 95% 95% Weight:      
Height:      
 
PE: 
General: trached on Vent. Lung: rhonchi bilaterally CV; Regular rate, S1, S2, no Rub Abd: soft, non tender, + BS Ext: trace edema Access: right temp line-intact Intake/Output Summary (Last 24 hours) at 1/25/2021 1108 Last data filed at 1/25/2021 0800 Gross per 24 hour Intake 1332.72 ml Output 615 ml Net 717.72 ml Current Facility-Administered Medications Medication Dose Route Frequency  0.9% sodium chloride infusion 250 mL  250 mL IntraVENous PRN  
 famotidine (PEPCID) tablet 20 mg  20 mg Oral DAILY  risperiDONE (RisperDAL) tablet 0.5 mg  0.5 mg Oral BID  risperiDONE (RisperDAL) 1 mg/mL oral solution soln 1 mg  1 mg Nasogastric BID  [Held by provider] guanFACINE IR (TENEX) tablet 1 mg  1 mg Oral BID  insulin glargine (LANTUS) injection 8 Units  8 Units SubCUTAneous QHS  docusate (COLACE) 50 mg/5 mL oral liquid 100 mg  100 mg Per NG tube DAILY PRN  
 heparin (porcine) 1,000 unit/mL injection 5,000 Units  5,000 Units IntraVENous DIALYSIS PRN  
 insulin regular (NOVOLIN R, HUMULIN R) injection   SubCUTAneous Q4H  
 alcohol 62% (NOZIN) nasal  1 Ampule  1 Ampule Topical Q12H  
 0.9% sodium chloride infusion 250 mL  250 mL IntraVENous PRN  
 heparin (porcine) injection 5,000 Units  5,000 Units SubCUTAneous Q8H  
 fentaNYL in normal saline (pf) 25 mcg/mL infusion  0-200 mcg/hr IntraVENous TITRATE  fentaNYL citrate (PF) injection 50 mcg  50 mcg IntraVENous Q2H PRN  
 bisacodyL (DULCOLAX) suppository 10 mg  10 mg Rectal DAILY PRN  
  NUTRITIONAL SUPPORT ELECTROLYTE PRN ORDERS   Does Not Apply PRN  
 [Held by provider] hydrALAZINE (APRESOLINE) tablet 10 mg  10 mg Per NG tube TID  dextrose 40% (GLUTOSE) oral gel 1 Tube  15 g Oral PRN  
 glucagon (GLUCAGEN) injection 1 mg  1 mg IntraMUSCular PRN  
 dextrose (D50W) injection syrg 12.5-25 g  25-50 mL IntraVENous PRN  
 albuterol (PROVENTIL HFA, VENTOLIN HFA, PROAIR HFA) inhaler 2 Puff  2 Puff Inhalation Q4H PRN  
 hydrALAZINE (APRESOLINE) 20 mg/mL injection 20 mg  20 mg IntraVENous Q2H PRN  phenol throat spray (CHLORASEPTIC) 1 Spray  1 Spray Oral PRN  
 lip protectant (BLISTEX) ointment 1 Each  1 Each Topical PRN  
 acetaminophen (TYLENOL) tablet 650 mg  650 mg Oral Q6H PRN  
 sodium chloride (NS) flush 5-40 mL  5-40 mL IntraVENous Q8H  
 sodium chloride (NS) flush 5-40 mL  5-40 mL IntraVENous PRN  
 albuterol (PROVENTIL VENTOLIN) nebulizer solution 2.5 mg  2.5 mg Nebulization Q6H RT  
 
 
Recent Labs  
  01/25/21 
0337 01/24/21 
0331 01/23/21 
0307 WBC 15.4* 15.2* 13.9* HGB 6.7* 7.4* 7.6* HCT 21.8* 24.3* 25.4*  
 248 253 Recent Labs  
  01/25/21 
7574 01/24/21 
0331 01/23/21 
5910  140 139  
K 3.9 4.2 4.3  104 104 CO2 33* 34* 32 BUN 62* 52* 70* CREA 2.29* 2.02* 2.43* CA 9.1 9.0 9.2 * 160* 161* MG 2.3  --   --   
PHOS 3.2  --   --   
 
 
Assessment and Plan: 1. PRATIMA on CKD SLED 1/23, but  Now increasing UOP, though BUN/Cr are up some from yesterday. Hopefully on the cusp of recovery. Will hold today and assess daily for dialysis need. 2. Hyperkalemia  better 
  
3. COVID 19 +/acute respiratory failure/ARDs ventilated per pulm 4. Anemia- transfusion threshold per primary.  
 
Daniela Real, MD

## 2021-01-25 NOTE — PROCEDURES
Routine EEG Report Reason for EEG: Altered mental status Current Facility-Administered Medications:  
  0.9% sodium chloride infusion 250 mL, 250 mL, IntraVENous, PRN, Rica Moran MD 
  famotidine (PEPCID) tablet 20 mg, 20 mg, Oral, DAILY, Marissa Simmons NP, 20 mg at 01/25/21 1302   risperiDONE (RisperDAL) tablet 0.5 mg, 0.5 mg, Oral, BID, Marissa Simmons NP 
  insulin regular (NOVOLIN R, HUMULIN R) injection, , SubCUTAneous, BID, Marissa Simmons NP 
  [Held by provider] guanFACINE IR (TENEX) tablet 1 mg, 1 mg, Oral, BID, Nate Lae MD, 1 mg at 01/22/21 4202 
  insulin glargine (LANTUS) injection 8 Units, 8 Units, SubCUTAneous, QHS, Nate Lea MD, 8 Units at 01/24/21 2133 
  docusate (COLACE) 50 mg/5 mL oral liquid 100 mg, 100 mg, Per NG tube, DAILY PRN, Mae Michaud MD 
  heparin (porcine) 1,000 unit/mL injection 5,000 Units, 5,000 Units, IntraVENous, DIALYSIS PRN, Casa Echevarria MD, 3,000 Units at 01/23/21 0911 
  alcohol 62% (NOZIN) nasal  1 Ampule, 1 Ampule, Topical, Q12H, Lilian Gr MD, 1 Ampule at 01/25/21 1029 
  0.9% sodium chloride infusion 250 mL, 250 mL, IntraVENous, PRN, Antwon Mcfarland MD 
  heparin (porcine) injection 5,000 Units, 5,000 Units, SubCUTAneous, Q8H, Anabel Killian MD, 5,000 Units at 01/25/21 1029 
  fentaNYL in normal saline (pf) 25 mcg/mL infusion, 0-200 mcg/hr, IntraVENous, TITRATE, Nate Lea MD, Last Rate: 5 mL/hr at 01/25/21 0717, 125 mcg/hr at 01/25/21 0717 
  fentaNYL citrate (PF) injection 50 mcg, 50 mcg, IntraVENous, Q2H PRN, Delores Jones MD, 50 mcg at 01/25/21 4692   bisacodyL (DULCOLAX) suppository 10 mg, 10 mg, Rectal, DAILY PRN, Mae Michaud MD, 10 mg at 01/01/21 1342 
  NUTRITIONAL SUPPORT ELECTROLYTE PRN ORDERS, , Does Not Apply, PRN, Kaia Forte MD 
  [Held by provider] hydrALAZINE (APRESOLINE) tablet 10 mg, 10 mg, Per NG tube, TID, Mae Michaud MD, Stopped at 12/30/20 0900   dextrose 40% (GLUTOSE) oral gel 1 Tube, 15 g, Oral, PRN, Key Marx MD, 1 Tube at 12/29/20 0532 
  glucagon (GLUCAGEN) injection 1 mg, 1 mg, IntraMUSCular, PRN, Nas Nails MD 
  dextrose (D50W) injection syrg 12.5-25 g, 25-50 mL, IntraVENous, PRN, Key Marx MD, 12.5 g at 01/19/21 0747 
  albuterol (PROVENTIL HFA, VENTOLIN HFA, PROAIR HFA) inhaler 2 Puff, 2 Puff, Inhalation, Q4H PRN, Wilman Longoria MD 
  hydrALAZINE (APRESOLINE) 20 mg/mL injection 20 mg, 20 mg, IntraVENous, Q2H PRN, Key Marx MD, 20 mg at 01/17/21 2240   phenol throat spray (CHLORASEPTIC) 1 Spray, 1 Spray, Oral, PRN, Stefany Dooley MD, 1 Spray at 12/29/20 8378   lip protectant (BLISTEX) ointment 1 Each, 1 Each, Topical, PRN, Ye, Tova Veliz MD, 1 Each at 01/24/21 1732   acetaminophen (TYLENOL) tablet 650 mg, 650 mg, Oral, Q6H PRN, Stefany Dooley MD, 650 mg at 01/17/21 2151   sodium chloride (NS) flush 5-40 mL, 5-40 mL, IntraVENous, Q8H, Tres Belcher MD, 10 mL at 01/25/21 1309 
  sodium chloride (NS) flush 5-40 mL, 5-40 mL, IntraVENous, PRN, Tova Belcher MD, 20 mL at 01/07/21 0406   albuterol (PROVENTIL VENTOLIN) nebulizer solution 2.5 mg, 2.5 mg, Nebulization, Q6H RT, Tres Belcher MD, 2.5 mg at 01/25/21 1443 Technical Summary: This EEG was performed with a 32-channel digital EEG machine with electrodes placed according to the international 10-20 system of placement. Background:  
During the maximal awake state no posterior dominant rhythm was seen. Anterior background consisted of medium amplitude activity in the primarily in the delta to theta range. Quasi-generalized periodic discharges are present for much of this recording at approximately 1 Hz. Hyperventilation: Hyperventilation was not performed due to medical condition Photic Stimulation: Photic stimulation was not performed due to medical condition Sleep: None Impression: The results of this EEG are abnormal.  There is slowing of the background consistent with a moderate to severe degree of encephalopathy, nonspecific in origin but representing global CNS dysfunction. There are no lateralizing features or epileptiform discharges.

## 2021-01-25 NOTE — PROGRESS NOTES
Ventilator check complete; patient has a #6 tracheostomy. Patient is not sedated. Patient is not able to follow commands. Breath sounds are diminished. Trachea is midline, Negative for subcutaneous air, and chest excursion is symmetric. Patient is also Negative for cyanosis and is Negative for pitting edema. All alarms are set and audible. Resuscitation bag is at the head of the bed. Ventilator Settings Mode FIO2 Rate Tidal Volume Pressure PEEP I:E Ratio Pressure control  40 %   28 400 ml  12 cm H2O  10 cm H20  1:1.68 Peak airway pressure: 26 cm H2O Minute ventilation: 10.9 l/min [address], [place]

## 2021-01-25 NOTE — CONSULTS
[facility] Consult Note Primary GI Physician: Dr Bethany Lobo Referring Provider:  Dr Da Gusman Consult Date:  1/25/2021 Admit Date:  12/24/2020 Chief Complaint:  PEG placement Subjective:  
 
History of Present Illness:  Patient is a 78 y.o. female with PMH CAD, DM2. COVID infection complicated by ARDS, requiring continued hospitalization. She has been minimally responsive with concerns for seizure activity and neurology has been consulted with symptoms felt related to a COVID encephalopathy. EEG and MRI head pending. Palliative care has been following patient and family has agreed to DNR but have not agreed to comfort measures only. She had MI with arrest 12/20, as well as renal failure with dialysis started 1/12. Dialysis has been intermittent with stable but elevated creatinine. Alli Olivera was placed at bedside 1/19 by pulmonology for ventilator dependence with inability to wean. She has been tolerating ng tube feeds and PEG placement has been requested. Although she has had issues with thrombocytopenia this admission, this has resolved. WBC remains elevated at 15.4 with hgb low today at 6.7; she is receiving PRBC at this time. She has had antibx for uti and MRSA but course is now completed. Patient is not able to participate in ROS and is not responsive to verbal questioning or physical touch with exam. 
 
 
 
PMH: 
Past Medical History:  
Diagnosis Date  Acute cystitis without hematuria 1/30/2019  Acute pancreatitis 8/12/2019  CAD (coronary artery disease)  Diabetic ketoacidosis ([facility] [address] 3/8/2020  DM2 (diabetes mellitus, type 2) ([facility] [address]  Elevated liver function tests 8/8/2019  Gout  Gram-negative bacteremia 8/9/2019  HLD (hyperlipidemia)  HTN (hypertension)  Peripheral neuropathy  Right lower quadrant abdominal pain 8/8/2019 PSH: 
Past Surgical History:  
Procedure Laterality Date  HX CHOLECYSTECTOMY jennifer in 1964  HX CORONARY ARTERY BYPASS GRAFT    
 x3  
 HX TUBAL LIGATION    
 IR INSERT NON TUNL CVC OVER 5 YRS  1/11/2021  AR CARDIAC SURG PROCEDURE UNLIST    
 stents x 3 2009 Allergies: Allergies Allergen Reactions  Sulfa (Sulfonamide Antibiotics) Swelling Home Medications: 
Prior to Admission medications Medication Sig Start Date End Date Taking? Authorizing Provider  
atorvastatin (LIPITOR) 40 mg tablet Take 40 mg by mouth. 6/2/20   Provider, Historical  
aspirin delayed-release 81 mg tablet Take  by mouth daily. Provider, Historical  
metoprolol tartrate (LOPRESSOR) 25 mg tablet Take 0.5 Tabs by mouth every twelve (12) hours. 3/16/20   Barfell, Richard Arlina Bernheim, MD  
insulin lispro (HUMALOG) 100 unit/mL injection 15 Units by SubCUTAneous route Before breakfast, lunch, and dinner. 9/20/19   Leroy Winston NP Insulin Needles, Disposable, 31 gauge x 5/16\" ndle by SubCUTAneous route Before breakfast, lunch, dinner and at bedtime. 9/20/19   Alessandra CHARLES NP  
clopidogrel (PLAVIX) 75 mg tab Take 75 mg by mouth. Provider, Historical  
insulin glargine (LANTUS) 100 unit/mL injection 50 Units by SubCUTAneous route nightly. 2/6/19   Devyn Dewitt MD  
Blood-Glucose Meter [facility] ULTRAMINI) monitoring kit Use as directed 4/14/15   Donald Claros MD  
glucose blood VI test strips [facility] ULTRA TEST) strip Test 4 times daily as directed 4/14/15   Donald Claros MD  
Lancets [facility] ULTRASOFT LANCETS) misc Test 4 times daily as directed 4/14/15   Donald Claros MD  
losartan (COZAAR) 100 mg tablet Take  by mouth daily. 1/12/15   Provider, Historical  
rosuvastatin (CRESTOR) 20 mg tablet Take 1 Tab by mouth nightly. 3/9/15   Donald Claros MD  
gabapentin (NEURONTIN) 100 mg capsule Take 1 Cap by mouth three (3) times daily. Patient taking differently: Take 100 mg by mouth two (2) times a day.  3/9/15   Donald Claros MD  
 rOPINIRole (REQUIP) 0.5 mg tablet Take 1 Tab by mouth nightly as needed. 4/21/14   Shereen Delgado MD  
 
 
[facility] Medications: 
Current Facility-Administered Medications Medication Dose Route Frequency  0.9% sodium chloride infusion 250 mL  250 mL IntraVENous PRN  
 famotidine (PEPCID) tablet 20 mg  20 mg Oral DAILY  risperiDONE (RisperDAL) tablet 0.5 mg  0.5 mg Oral BID  furosemide (LASIX) injection 40 mg  40 mg IntraVENous ONCE  
 insulin regular (NOVOLIN R, HUMULIN R) injection   SubCUTAneous BID  [Held by provider] guanFACINE IR (TENEX) tablet 1 mg  1 mg Oral BID  insulin glargine (LANTUS) injection 8 Units  8 Units SubCUTAneous QHS  docusate (COLACE) 50 mg/5 mL oral liquid 100 mg  100 mg Per NG tube DAILY PRN  
 heparin (porcine) 1,000 unit/mL injection 5,000 Units  5,000 Units IntraVENous DIALYSIS PRN  
 alcohol 62% (NOZIN) nasal  1 Ampule  1 Ampule Topical Q12H  
 0.9% sodium chloride infusion 250 mL  250 mL IntraVENous PRN  
 heparin (porcine) injection 5,000 Units  5,000 Units SubCUTAneous Q8H  
 fentaNYL in normal saline (pf) 25 mcg/mL infusion  0-200 mcg/hr IntraVENous TITRATE  fentaNYL citrate (PF) injection 50 mcg  50 mcg IntraVENous Q2H PRN  
 bisacodyL (DULCOLAX) suppository 10 mg  10 mg Rectal DAILY PRN  
 NUTRITIONAL SUPPORT ELECTROLYTE PRN ORDERS   Does Not Apply PRN  
 [Held by provider] hydrALAZINE (APRESOLINE) tablet 10 mg  10 mg Per NG tube TID  dextrose 40% (GLUTOSE) oral gel 1 Tube  15 g Oral PRN  
 glucagon (GLUCAGEN) injection 1 mg  1 mg IntraMUSCular PRN  
 dextrose (D50W) injection syrg 12.5-25 g  25-50 mL IntraVENous PRN  
 albuterol (PROVENTIL HFA, VENTOLIN HFA, PROAIR HFA) inhaler 2 Puff  2 Puff Inhalation Q4H PRN  
 hydrALAZINE (APRESOLINE) 20 mg/mL injection 20 mg  20 mg IntraVENous Q2H PRN  phenol throat spray (CHLORASEPTIC) 1 Spray  1 Spray Oral PRN  
 lip protectant (BLISTEX) ointment 1 Each  1 Each Topical PRN  
  acetaminophen (TYLENOL) tablet 650 mg  650 mg Oral Q6H PRN  
 sodium chloride (NS) flush 5-40 mL  5-40 mL IntraVENous Q8H  
 sodium chloride (NS) flush 5-40 mL  5-40 mL IntraVENous PRN  
 albuterol (PROVENTIL VENTOLIN) nebulizer solution 2.5 mg  2.5 mg Nebulization Q6H RT  
 
 
Social History: 
Social History Tobacco Use  Smoking status: Never Smoker  Smokeless tobacco: Never Used Substance Use Topics  Alcohol use: Yes Comment: socially Pt denies any history of drug use, blood transfusions, or tattoos. Family History: 
Family History Problem Relation Age of Onset  Hypertension Mother  Cancer Mother  Diabetes Mother  Heart Disease Mother Review of Systems: A detailed 10 system ROS is obtained, with pertinent positives as listed above. All others are negative. Diet:  Ng tube feeds at 35 ml/hr Objective:  
 
Physical Exam: 
Vitals: 
Visit Vitals BP (!) 147/67 Pulse (!) 123 Temp 98.8 °F (37.1 °C) Resp 28 Ht 5' 5\" (1.651 m) Wt 75.2 kg (165 lb 12.6 oz) SpO2 95% BMI 27.59 kg/m² Gen:  Pt is not alert or oriented, no acute distress Skin:  Extremities and face reveal no rashes. HEENT: Sclerae anicteric. No oral ulcers. No abnormal pigmentation of the lips. . 
Cardiovascular: Regular rate and rhythm. No murmurs, gallops, or rubs. Respiratory:  Comfortable breathing with no accessory muscle use. Trach with vent intact. Clear breath sounds anteriorly with no wheezes, rales, or rhonchi. GI:  Abdomen nondistended, soft, and nontender. Normal active bowel sounds. No masses palpable. Rectal:  Deferred Musculoskeletal:  No pitting edema of the lower legs. Neurological:  Altered. Psychiatric:  Unable to assess. Lymphatic:  No cervical or supraclavicular adenopathy. Laboratory:   
Recent Labs  
  01/25/21 
7513 01/24/21 
0331 01/23/21 
5283 WBC 15.4* 15.2* 13.9* HGB 6.7* 7.4* 7.6* HCT 21.8* 24.3* 25.4*  
 248 253 MCV 97.8 98.8* 98.4*  140 139  
K 3.9 4.2 4.3  104 104 CO2 33* 34* 32 BUN 62* 52* 70* CREA 2.29* 2.02* 2.43* CA 9.1 9.0 9.2 MG 2.3  --   --   
* 160* 161* Assessment:  
 
Principal Problem: 
  Acute respiratory distress syndrome (ARDS) due to severe acute respiratory syndrome coronavirus 2 (SARS-CoV-2) ([facility] [address] (12/24/2020) Active Problems: 
  Diabetes mellitus with hyperosmolarity without hyperglycemic hyperosmolar nonketotic coma ([facility] [address] (11/24/2014) Coronary artery disease involving coronary bypass graft of native heart without angina pectoris (6/15/2015) Uncontrolled type 2 diabetes mellitus with hyperglycemia ([facility] [address] (6/15/2015) Acute on chronic renal failure ([facility] [address] (9/17/2019) COVID-19 (12/24/2020) Acute hypoxemic respiratory failure ([facility] [address] (12/24/2020) Cardiac arrest ([facility] [address] (12/30/2020) Plan:  
 
79 yo female with hx of COVID-19 infections and subsequent ARDS is seen today for evaluation for PEG placement. Hospital course complicated by MI and acute renal failure requiring intermittent dialysis. Trach placed at bedside 1/19 for inability to wean from the vent. She has had altered mental status felt likely related to COVID per neurology; MRI head and EEG pending today. Palliative care has discussed goals of care with family and although they have agreed to DNR status, they are not willing to proceed with only comfort measures. 1.  Appropriate for PEG placement 2. Tentative scheduled for 1/26 pending discussion with family and results of MRI and EEG ordered for today 3. NPO after midnight 4. Will hold sub-q heparin dose just prior to procedure Patient is seen and examined in collaboration with Dr. Jhony Moreno. Assessment and plan as per Dr. Jhony Moreno.  
Carole Carlos NP

## 2021-01-25 NOTE — PROGRESS NOTES
Bedside, Verbal and Written shift change report given to Jigar Dukes RN (oncoming nurse) by Danielle Roach RN (offgoing nurse). Report included the following information SBAR, Kardex, Intake/Output, MAR, Med Rec Status, Cardiac Rhythm SR, Alarm Parameters  and Quality Measures. Fentanyl gtt rate dual verified in the [facility]

## 2021-01-25 NOTE — PROGRESS NOTES
Corie Jo Admission Date: 12/24/2020 Daily Progress Note: 1/25/2021 The patient's chart is reviewed and the patient is discussed with the staff. Admitted with acute respiratory failure secondary to COVID 19. Rx with Decadron and plasma. She was in renal failure on admission so did not get Remdesivir. She was initially on bipap but suffered with a cardiac arrest on 12/30 so was intubated at that time. She has remained vent dependent since then. Rx for an E coli UTI. Also grew MRSA in her sputum. Now off abx. Has acute on chronic renal failure - started on dialysis on 1/12. Trach placed 1/19. Subjective:  
  Sedated with Fentanyl and Seroquel. Restless and tachypneac. Not able to follow any commands. Restrained for safety. Current Facility-Administered Medications Medication Dose Route Frequency  0.9% sodium chloride infusion 250 mL  250 mL IntraVENous PRN  
 [Held by provider] guanFACINE IR (TENEX) tablet 1 mg  1 mg Oral BID  risperiDONE (RisperDAL) 1 mg/mL oral solution soln 0.5 mg  0.5 mg Nasogastric BID  insulin glargine (LANTUS) injection 8 Units  8 Units SubCUTAneous QHS  docusate (COLACE) 50 mg/5 mL oral liquid 100 mg  100 mg Per NG tube DAILY PRN  
 heparin (porcine) 1,000 unit/mL injection 5,000 Units  5,000 Units IntraVENous DIALYSIS PRN  
 insulin regular (NOVOLIN R, HUMULIN R) injection   SubCUTAneous Q4H  
 alcohol 62% (NOZIN) nasal  1 Ampule  1 Ampule Topical Q12H  
 0.9% sodium chloride infusion 250 mL  250 mL IntraVENous PRN  
 heparin (porcine) injection 5,000 Units  5,000 Units SubCUTAneous Q8H  
 fentaNYL in normal saline (pf) 25 mcg/mL infusion  0-200 mcg/hr IntraVENous TITRATE  fentaNYL citrate (PF) injection 50 mcg  50 mcg IntraVENous Q2H PRN  
 bisacodyL (DULCOLAX) suppository 10 mg  10 mg Rectal DAILY PRN  
 NUTRITIONAL SUPPORT ELECTROLYTE PRN ORDERS   Does Not Apply PRN  
  [Held by provider] hydrALAZINE (APRESOLINE) tablet 10 mg  10 mg Per NG tube TID  famotidine (PF) (PEPCID) 20 mg in 0.9% sodium chloride 10 mL injection  20 mg IntraVENous DAILY  dextrose 40% (GLUTOSE) oral gel 1 Tube  15 g Oral PRN  
 glucagon (GLUCAGEN) injection 1 mg  1 mg IntraMUSCular PRN  
 dextrose (D50W) injection syrg 12.5-25 g  25-50 mL IntraVENous PRN  
 albuterol (PROVENTIL HFA, VENTOLIN HFA, PROAIR HFA) inhaler 2 Puff  2 Puff Inhalation Q4H PRN  
 hydrALAZINE (APRESOLINE) 20 mg/mL injection 20 mg  20 mg IntraVENous Q2H PRN  phenol throat spray (CHLORASEPTIC) 1 Spray  1 Spray Oral PRN  
 lip protectant (BLISTEX) ointment 1 Each  1 Each Topical PRN  
 acetaminophen (TYLENOL) tablet 650 mg  650 mg Oral Q6H PRN  
 sodium chloride (NS) flush 5-40 mL  5-40 mL IntraVENous Q8H  
 sodium chloride (NS) flush 5-40 mL  5-40 mL IntraVENous PRN  
 albuterol (PROVENTIL VENTOLIN) nebulizer solution 2.5 mg  2.5 mg Nebulization Q6H RT  
 
 
 
Objective:  
 
Vitals:  
 01/25/21 0758 01/25/21 0800 01/25/21 0854 01/25/21 5552 BP: 129/67 Pulse: (!) 104  (!) 108 (!) 107 Resp: 24  24 30 Temp:  98.8 °F (37.1 °C) SpO2: 93%  98% 95% Weight:      
Height:      
 
Intake and Output:  
01/23 1901 - 01/25 0700 In: 1897.5 [I.V.:145.5] Out: 8117 [Urine:965; Drains:100] 01/25 0701 - 01/25 1900 In: -  
Out: 75 [Urine:75] Physical Exam:  
Constitutional:  the patient is well developed and in mild distress with tachypnea HEENT:  Sclera clear but with edema, pupils equal 
Lungs: rhonchi bilaterally - right > left. On vent - resp rate in the 20s Cardiovascular:  RRR without M,G,R. Sinus tach per telemetry Abd/GI: soft and no evidence of tenderness; with positive bowel sounds. Ext: warm without cyanosis. There is noted arm edema - none in legs Musculoskeletal: moves all four extremities with equal strength. Wearing mitts Skin:  no jaundice or rashes Neuro: Eyes partially open, not focused. Can't follow commands, restless Musculoskeletal: can't ambulate. No deformity Psychiatric: cannot assess Review of Systems - cannot assess due to mental status Lines: left neck central line, HD cath, coyle, flexiseal, NG, trach CHEST XRAY:  
 
 
LAB Recent Labs  
  01/25/21 
1439 01/24/21 
0331 01/23/21 
1793 WBC 15.4* 15.2* 13.9* HGB 6.7* 7.4* 7.6* HCT 21.8* 24.3* 25.4*  
 248 253 Recent Labs  
  01/25/21 
2585 01/24/21 
0331 01/23/21 
2231  140 139  
K 3.9 4.2 4.3  104 104 CO2 33* 34* 32 * 160* 161* BUN 62* 52* 70* CREA 2.29* 2.02* 2.43* MG 2.3  --   --   
PHOS 3.2  --   --   
 
Ventilator Settings Mode FIO2 Rate Tidal Volume Pressure PEEP Pressure control  40 %    400 ml  12 cm H2O  10 cm H20 Peak airway pressure: 26 cm H2O Minute ventilation: 10 l/min ABG:  
Recent Labs  
  01/25/21 
0313 01/24/21 
0327 01/23/21 
0331 PHI 7.35 7.34* 7.33* PCO2I 60.0* 61.1* 59.4*  
PO2I 51* 99 87 HCO3I 32.8* 32.9* 31.3* Assessment:  (Medical Decision Making) Hospital Problems  Date Reviewed: 8/10/2019 Codes Class Noted POA Cardiac arrest [facility]) ICD-10-CM: I46.9 ICD-9-CM: 427.5  12/30/2020 No  
   
 Acute cystitis without hematuria ICD-10-CM: N30.00 ICD-9-CM: 595.0  12/29/2020 No  
   
 COVID-19 ICD-10-CM: U07.1 ICD-9-CM: 079.89  12/24/2020 Yes * (Principal) Acute respiratory distress syndrome (ARDS) due to severe acute respiratory syndrome coronavirus 2 (SARS-CoV-2) ([facility]) ICD-10-CM: U07.1, J80 
ICD-9-CM: 518.82, 079.89  12/24/2020 No  
   
 Acute hypoxemic respiratory failure ([facility] [address] ICD-10-CM: J96.01 
ICD-9-CM: 518.81  12/24/2020 Yes Acute on chronic renal failure (HCC) ICD-10-CM: N17.9, N18.9 ICD-9-CM: 584.9, 585.9  9/17/2019 Yes  Coronary artery disease involving coronary bypass graft of native heart without angina pectoris (Chronic) ICD-10-CM: S22.266 
 ICD-9-CM: 414.05  6/15/2015 Yes Uncontrolled type 2 diabetes mellitus with hyperglycemia (HCC) (Chronic) ICD-10-CM: E11.65 ICD-9-CM: 250.02  6/15/2015 Yes Diabetes mellitus with hyperosmolarity without hyperglycemic hyperosmolar nonketotic coma (HCC) (Chronic) ICD-10-CM: E11.00 ICD-9-CM: 250.20  11/24/2014 Yes Plan:  (Medical Decision Making) 1. Currently on PC - TV variable with restlessness. FIO2 at 40% and PO2 only 51 on ABG - increase FIO2. CXR reviewed. Increased infiltrates on the right. + fluid balance 2. ? Dialysis today. 590 mls urine output and labs not bad but CXR looks worse, more hypoxic. 3. Needs PEG 4. Remove trach sutures 5. Remove flexiseal 
6. Transfuse 1 unit of blood today. Stool looks brown - no evidence active bleeding 7. DNR - daughter called today and concerned about mental status. Had head CT - negative. Will ask neuro to see as this may help daughter with decision making 8. Increase Risperdal to 1 mg BID and try to wean off Fentanyl Dion Patton NP More than 50% of time documented was spent face-to-face contact with the patient and in the care of the patient on the floor/unit where the patient is located. Lungs:  Diminished, Vent. Heart:  RRR with no Murmur/Rubs/Gallops Additional Comments: Not responsive. Still on some Fentanyl, wean. Ask neuro and palliative care to weigh in. Consult LTAC, but long term prognosis seems poor. I have spoken with and examined the patient. I agree with the above assessment and plan as documented.  
 
Joni Holstein, MD

## 2021-01-25 NOTE — PROGRESS NOTES
CM continues to follow for discharge planning and/or CM needs. Referral to [facility] made already to determine eligibility of pt when/if medically stable per chart review. No additional CM needs at this time. Will continue to monitor and update as needed.

## 2021-01-25 NOTE — CONSULTS
Consult Patient: Darius Montez MRN: [id number] YOB: 1940  Age: 78 y.o. Sex: female Subjective: Darius Montez is a 78 y.o. female who is being seen for altered mental status. The patient was admitted on 12/24/2020 for acute respiratory distress syndrome secondary to COVID-19 infection. She has had a prolonged hospitalization. Neurology has been consulted because the patient is minimally responsive and does not follow commands. She has been like this for several days. She has been seen by neurology in the past for possible seizure and altered mental status. She was seen by Dr. Rizwan Martinez in 2019 for these reasons. The patient did suffer a cardiac arrest on 12/30. The patient also started dialysis on January 12. Oval Ar was placed on January 19. Past Medical History:  
Diagnosis Date  Acute cystitis without hematuria 1/30/2019  Acute pancreatitis 8/12/2019  CAD (coronary artery disease)  Diabetic ketoacidosis ([facility] [address] 3/8/2020  DM2 (diabetes mellitus, type 2) ([facility] [address]  Elevated liver function tests 8/8/2019  Gout  Gram-negative bacteremia 8/9/2019  HLD (hyperlipidemia)  HTN (hypertension)  Peripheral neuropathy  Right lower quadrant abdominal pain 8/8/2019 Past Surgical History:  
Procedure Laterality Date  HX CHOLECYSTECTOMY jennifer in 1964  HX CORONARY ARTERY BYPASS GRAFT    
 x3  
 HX TUBAL LIGATION    
 IR INSERT NON TUNL CVC OVER 5 YRS  1/11/2021  DC CARDIAC SURG PROCEDURE UNLIST    
 stents x 3 2009 Family History Problem Relation Age of Onset  Hypertension Mother  Cancer Mother  Diabetes Mother  Heart Disease Mother Social History Tobacco Use  Smoking status: Never Smoker  Smokeless tobacco: Never Used Substance Use Topics  Alcohol use: Yes Comment: socially Current Facility-Administered Medications Medication Dose Route Frequency Provider Last Rate Last Admin  
 0.9% sodium chloride infusion 250 mL  250 mL IntraVENous PRN Lissett Moran MD      
 famotidine (PEPCID) tablet 20 mg  20 mg Oral DAILY Amy Reddy NP      
 risperiDONE (RisperDAL) tablet 0.5 mg  0.5 mg Oral BID Amy Reddy NP      
 furosemide (LASIX) injection 40 mg  40 mg IntraVENous ONCE Torrie Fung MD      
 [Held by provider] guanFACINE IR (TENEX) tablet 1 mg  1 mg Oral BID Canelo Williamson MD   1 mg at 01/22/21 7845  
 insulin glargine (LANTUS) injection 8 Units  8 Units SubCUTAneous QHS Canelo Williamson MD   8 Units at 01/24/21 2133  
 docusate (COLACE) 50 mg/5 mL oral liquid 100 mg  100 mg Per NG tube DAILY PRN Conrad Urbina MD      
 heparin (porcine) 1,000 unit/mL injection 5,000 Units  5,000 Units IntraVENous DIALYSIS PRN Shay Montelongo MD   3,000 Units at 01/23/21 0911  
 insulin regular (Arville Jane R, HUMULIN R) injection   SubCUTAneous Q4H Anita Reyes MD   3 Units at 01/25/21 1032  
 alcohol 62% (NOZIN) nasal  1 Ampule  1 Ampule Topical Q12H Yanna Gr MD   1 Ampule at 01/25/21 1029  
 0.9% sodium chloride infusion 250 mL  250 mL IntraVENous PRN Zaire Wilkinson MD      
 heparin (porcine) injection 5,000 Units  5,000 Units SubCUTAneous Q8H Anita Reyes MD   5,000 Units at 01/25/21 1029  
 fentaNYL in normal saline (pf) 25 mcg/mL infusion  0-200 mcg/hr IntraVENous TITRATE Canelo Williamson MD 5 mL/hr at 01/25/21 0717 125 mcg/hr at 01/25/21 0717  
 fentaNYL citrate (PF) injection 50 mcg  50 mcg IntraVENous Q2H PRN Yaneth Ricci MD   50 mcg at 01/25/21 7472  bisacodyL (DULCOLAX) suppository 10 mg  10 mg Rectal DAILY PRN Conrad Urbina MD   10 mg at 01/01/21 1342  
 NUTRITIONAL SUPPORT ELECTROLYTE PRN ORDERS   Does Not Apply PRN Richardson Shone, MD      
  [Held by provider] hydrALAZINE (APRESOLINE) tablet 10 mg  10 mg Per NG tube TID Luis Mancilla MD   Stopped at 12/30/20 0900  
 dextrose 40% (GLUTOSE) oral gel 1 Tube  15 g Oral PRN Yuri De Dios MD   1 Tube at 12/29/20 0532  
 glucagon (GLUCAGEN) injection 1 mg  1 mg IntraMUSCular PRN Yuri De Dios MD      
 dextrose (D50W) injection syrg 12.5-25 g  25-50 mL IntraVENous PRN Yuri De Dios MD   12.5 g at 01/19/21 0747  
 albuterol (PROVENTIL HFA, VENTOLIN HFA, PROAIR HFA) inhaler 2 Puff  2 Puff Inhalation Q4H PRN Herbert Metzger MD      
 hydrALAZINE (APRESOLINE) 20 mg/mL injection 20 mg  20 mg IntraVENous Q2H PRN Yuri De Dios MD   20 mg at 01/17/21 2240  phenol throat spray (CHLORASEPTIC) 1 Spray  1 Spray Oral PRN Mann Aleman MD   1 Spray at 12/29/20 5521  lip protectant (BLISTEX) ointment 1 Each  1 Each Topical PRN Shannan Tony MD   1 Each at 01/24/21 1732  acetaminophen (TYLENOL) tablet 650 mg  650 mg Oral Q6H PRN Mann Aleman MD   650 mg at 01/17/21 2151  sodium chloride (NS) flush 5-40 mL  5-40 mL IntraVENous Q8H Tres Belcher MD   10 mL at 01/25/21 0529  
 sodium chloride (NS) flush 5-40 mL  5-40 mL IntraVENous PRN Samantha Belcher MD   20 mL at 01/07/21 0406  albuterol (PROVENTIL VENTOLIN) nebulizer solution 2.5 mg  2.5 mg Nebulization Q6H RT Samantha Belcher MD   2.5 mg at 01/25/21 5752 Allergies Allergen Reactions  Sulfa (Sulfonamide Antibiotics) Swelling Review of Systems: 
Could not obtain due to altered mental status Objective:  
 
Vitals:  
 01/25/21 4177 01/25/21 0514 01/25/21 7543 01/25/21 4386 BP:  (!) 141/64  (!) 147/67 Pulse: (!) 108 (!) 106 (!) 107 (!) 123 Resp: 24 (!) 39 30 28 Temp:      
SpO2: 98% 95% 95% 95% Weight:      
Height:      
  
 
Physical Exam: 
 
Neurological examination Level of consciousness: Some spontaneous movements of the facial musculature. She does not follow commands and does not appear to have meaningful interaction with her environment. Brain stem reflexes 
-Pupillary reflex to bright light: Equal, round, and reactive to light 
-Ciliospinal reflexes: Present 
-Oculovestibular and/or oculocephalic reflex response: Present 
-Corneal reflex: Present 
-Facial movement to painful stimulus at supraorbital nerve, temporomandibular joint: Present 
-Cough/gag reflex: Present Motor examination 
-Muscle tone: Normal muscle tone throughout. Occasional paratonia 
-Motor response to pain: No withdrawal 
-Muscle stretch reflexes: Trace reflexes throughout 
-Response to plantar stimulation: Downgoing to plantar stimulation Lab Results Component Value Date/Time Cholesterol, total 221 (H) 03/09/2015 02:42 PM  
 HDL Cholesterol 64 03/09/2015 02:42 PM  
 LDL, calculated 114 03/09/2015 02:42 PM  
 VLDL, calculated 43 (H) 03/09/2015 02:42 PM  
 Triglyceride 217 (H) 03/09/2015 02:42 PM  
 CHOL/HDL Ratio 3.5 03/09/2015 02:42 PM  
  
 
Lab Results Component Value Date/Time Hemoglobin A1c 8.4 (H) 12/26/2020 03:45 AM  
  
 
CT Results (most recent): Personally Reviewed Results from [facility] Encounter encounter on 12/24/20 CT HEAD WO CONT Narrative Noncontrast head CT Clinical Indication: History of remote right occipital infarct now with severe 
acute severe generalized weakness, altered mental status, Covid-19 positive. Technique: Noncontrast axial images were obtained through the brain. All CT 
scans at this location are performed using dose modulation techniques as 
appropriate including the following: Automated exposure control, adjustment of 
the MA and/or kV according to patient's size, or use of iterative reconstruction 
technique. Reformatted coronal images obtained to further evaluate bones, soft 
tissues, extra-axial spaces. Comparison: 1/30/2019 CT and 2/2/2019 MRI Findings: There is no acute intracranial hemorrhage, hydrocephalus, intra-axial 
mass, or mass-effect. Chronic right occipital infarct is similar to prior. There 
are tiny hypodensities in the bilateral basal ganglia which are likely chronic 
lacunar infarcts and not definitely changed from priors, given technique and 
positioning. There is no CT evidence of acute large artery territorial 
infarction or abnormal extra-axial fluid collection. The mastoid air cells demonstrate bilateral effusions. Paranasal sinuses are 
clear where imaged. No displaced skull fractures are present. Impression Impression: No CT evidence of acute intracranial abnormality. Chronic changes. Results for orders placed or performed during the hospital encounter of 12/24/20 EKG, 12 LEAD, INITIAL Result Value Ref Range Ventricular Rate 45 BPM  
 Atrial Rate 45 BPM  
 P-R Interval 184 ms QRS Duration 84 ms Q-T Interval 538 ms QTC Calculation (Bezet) 465 ms Calculated P Axis 34 degrees Calculated R Axis 38 degrees Calculated T Axis 93 degrees Diagnosis Sinus bradycardia Otherwise normal ECG When compared with ECG of 01-JAN-2021 01:23, 
Vent. rate has decreased BY  23 BPM 
T wave inversion no longer evident in Inferior leads T wave amplitude has increased in Anterolateral leads Confirmed by Pancho Monroy MD (), JAMES NEWELL ([id number]) on 1/5/2021 3:58:11 PM 
  
  
 
MRI Results (most recent): Personally Reviewed Results from Hospital Encounter encounter on 01/30/19 MRI BRAIN W WO CONT Narrative MRI of the brain INDICATION:  Seizures, encephalopathy Standard MRI sequences were obtained through the brain in multiple planes. Images were obtained before and after intravenous infusion of 15 mL of Dotarem. FINDINGS: 
There is mild diffuse cerebral and cerebellar atrophy. There is mild chronic change in the basal ganglia and brainstem. There is focal encephalomalacia in 
the right occipital lobe, likely old infarct. Temporal lobes are symmetric in 
size. .  There is no evidence of acute hemorrhage or infarction. The ventricles 
are normal in size. There are no extra-axial fluid collections. There are no 
bony lesions. The sinuses are clear. Post-contrast images show no abnormal enhancement. There are no masses. Impression IMPRESSION: Old right occipital infarct. No evidence of acute intracranial 
abnormality Most recent CTA Personally Reviewed Results from [facility] Encounter encounter on 12/24/20 CT HEAD WO CONT Narrative Noncontrast head CT Clinical Indication: History of remote right occipital infarct now with severe 
acute severe generalized weakness, altered mental status, Covid-19 positive. Technique: Noncontrast axial images were obtained through the brain. All CT 
scans at this location are performed using dose modulation techniques as 
appropriate including the following: Automated exposure control, adjustment of 
the MA and/or kV according to patient's size, or use of iterative reconstruction 
technique. Reformatted coronal images obtained to further evaluate bones, soft 
tissues, extra-axial spaces. Comparison: 1/30/2019 CT and 2/2/2019 MRI Findings: There is no acute intracranial hemorrhage, hydrocephalus, intra-axial 
mass, or mass-effect. Chronic right occipital infarct is similar to prior. There 
are tiny hypodensities in the bilateral basal ganglia which are likely chronic 
lacunar infarcts and not definitely changed from priors, given technique and 
positioning. There is no CT evidence of acute large artery territorial 
infarction or abnormal extra-axial fluid collection. The mastoid air cells demonstrate bilateral effusions. Paranasal sinuses are 
clear where imaged. No displaced skull fractures are present. Impression Impression: No CT evidence of acute intracranial abnormality. Chronic changes. Most recent Echo Results for orders placed or performed during the hospital encounter of 20  
2D ECHO COMPLETE ADULT (TTE) W OR WO CONTR Narrative [facility] [address] [place], [address] [facility] 
[phone] Transthoracic Echocardiogram 
2D, M-mode, Doppler, and Color Doppler Patient: Yazmin Duvall 
MR #: [id number] : [id number] Age: 78 years Gender: Female Study date: 31-Dec-2020 Account #: [de-identified] Height: 65 in 
Weight: 153.8 lb 
BSA: 1.77 mï¾² Status:Routine Location: 533 BP: 148/ 73 Allergies: SULFA (SULFONAMIDE ANTIBIOTICS) Sonographer:  Chacorta Reis RD Group:  [id number] Cardiology Referring Physician:  Thomas Rivera. Silvia Grey MD [facility] Reading Physician:  Thomas Rivera. Silvia Grey MD [facility] INDICATIONS: Bradycardia, Cardiogenic Shock PROCEDURE: This was a routine study. A transthoracic echocardiogram was 
performed. The study included complete 2D imaging, M-mode, complete spectral 
Doppler, and color Doppler. Image quality was adequate. LEFT VENTRICLE: Size was normal. Systolic function was normal. Ejection 
fraction was estimated in the range of 60 % to 65 %. There were no regional 
wall motion abnormalities. There was mild concentric hypertrophy. The E/e' 
ratio was 20.75. There was diastolic dysfunction. RIGHT VENTRICLE: The size was normal. Systolic function was normal. Estimated 
peak pressure was in the range of 45-50 mmHg. LEFT ATRIUM: The atrium was mildly dilated. RIGHT ATRIUM: Size was normal. 
 
SYSTEMIC VEINS: IVC: The inferior vena cava was normal in size. Unable to 
assess phasicity due to patient being on vent. AORTIC VALVE: The valve was trileaflet. Leaflets exhibited mild sclerosis. There was no evidence for stenosis. There was no insufficiency. MITRAL VALVE: Valve structure was normal. There was no evidence for stenosis. There was trivial regurgitation. TRICUSPID VALVE: The valve structure was normal. There was no evidence for 
stenosis. There was mild to moderate regurgitation. PULMONIC VALVE: The valve structure was normal. There was no evidence for 
stenosis. There was no insufficiency. PERICARDIUM: There was no pericardial effusion. AORTA: The root exhibited normal size. SUMMARY: 
 
-  Left ventricle: Systolic function was normal. Ejection fraction was 
estimated in the range of 60 % to 65 %. There were no regional wall motion 
abnormalities. There was mild concentric hypertrophy. The E/e' ratio was 20.75. There was diastolic dysfunction. -  Left atrium: The atrium was mildly dilated. -  Tricuspid valve: There was mild to moderate regurgitation. SYSTEM MEASUREMENT TABLES 
 
2D mode AoR Diam (2D): 3 cm Left Atrium Systolic Volume Index; Method of Disks, Biplane; 2D mode;: 37.3 
ml/m2 IVS/LVPW (2D): 1 IVSd (2D): 1.4 cm LVIDd (2D): 4 cm LVIDs (2D): 2.2 cm 
LVOT Area (2D): 3.1 cm2 LVPWd (2D): 1.4 cm Tissue Doppler Imaging LV Peak Early Mayen Tissue Junito; Lateral MA (TDI): 4.6 cm/s LV Peak Early [place] Tissue Junito; Medial MA (TDI): 4.4 cm/s Unspecified Scan Mode Peak Grad; Mean; Antegrade Flow: 10 mm[Hg] Vmax; Antegrade Flow: 161 cm/s LVOT Diam: 2 cm 
MV Peak Junito/LV Peak Tissue Junito E-Wave; Lateral MA: 20.1 MV Peak Junito/LV Peak Tissue Junito E-Wave; Medial MA: 21.4 Vmax; Regurgitant Flow: 316 cm/s Prepared and signed by 
 
Ronak Fritz. Marci Flanagan MD [facility] Signed 31-Dec-2020 13:20:33 Assessment:  
 
70-year-old woman with recent cardiac arrest and acute respiratory distress syndrome secondary to COVID-19 infection. Neurology was consulted for altered mental status. Altered mental status is likely multifactorial but likely related to COVID-19 encephalopathy. Plan: MRI and EEG to help with prognosis Neurology will continue to follow Signed By: Carlos Lawler DO   
 January 25, 2021

## 2021-01-25 NOTE — PROGRESS NOTES
Dr. Nancy Landry notified of patient's critical hemoglobin of 6.7. Orders received to transfuse 1 Unit PRBCs.

## 2021-01-25 NOTE — H&P (VIEW-ONLY)
[facility] Consult Note Primary GI Physician: Dr Brenda Khan Referring Provider:  Dr Darylene Sarin Consult Date:  1/25/2021 Admit Date:  12/24/2020 Chief Complaint:  PEG placement Subjective:  
 
History of Present Illness:  Patient is a 78 y.o. female with PMH CAD, DM2. COVID infection complicated by ARDS, requiring continued hospitalization. She has been minimally responsive with concerns for seizure activity and neurology has been consulted with symptoms felt related to a COVID encephalopathy. EEG and MRI head pending. Palliative care has been following patient and family has agreed to DNR but have not agreed to comfort measures only. She had MI with arrest 12/20, as well as renal failure with dialysis started 1/12. Dialysis has been intermittent with stable but elevated creatinine. Sita Stager was placed at bedside 1/19 by pulmonology for ventilator dependence with inability to wean. She has been tolerating ng tube feeds and PEG placement has been requested. Although she has had issues with thrombocytopenia this admission, this has resolved. WBC remains elevated at 15.4 with hgb low today at 6.7; she is receiving PRBC at this time. She has had antibx for uti and MRSA but course is now completed. Patient is not able to participate in ROS and is not responsive to verbal questioning or physical touch with exam. 
 
 
 
PMH: 
Past Medical History:  
Diagnosis Date  Acute cystitis without hematuria 1/30/2019  Acute pancreatitis 8/12/2019  CAD (coronary artery disease)  Diabetic ketoacidosis ([facility] [address] 3/8/2020  DM2 (diabetes mellitus, type 2) ([facility] [address]  Elevated liver function tests 8/8/2019  Gout  Gram-negative bacteremia 8/9/2019  HLD (hyperlipidemia)  HTN (hypertension)  Peripheral neuropathy  Right lower quadrant abdominal pain 8/8/2019 PSH: 
Past Surgical History:  
Procedure Laterality Date  HX CHOLECYSTECTOMY jennifer in 1964  HX CORONARY ARTERY BYPASS GRAFT    
 x3  
 HX TUBAL LIGATION    
 IR INSERT NON TUNL CVC OVER 5 YRS  1/11/2021  NV CARDIAC SURG PROCEDURE UNLIST    
 stents x 3 2009 Allergies: Allergies Allergen Reactions  Sulfa (Sulfonamide Antibiotics) Swelling Home Medications: 
Prior to Admission medications Medication Sig Start Date End Date Taking? Authorizing Provider  
atorvastatin (LIPITOR) 40 mg tablet Take 40 mg by mouth. 6/2/20   Provider, Historical  
aspirin delayed-release 81 mg tablet Take  by mouth daily. Provider, Historical  
metoprolol tartrate (LOPRESSOR) 25 mg tablet Take 0.5 Tabs by mouth every twelve (12) hours. 3/16/20   John Pitt MD  
insulin lispro (HUMALOG) 100 unit/mL injection 15 Units by SubCUTAneous route Before breakfast, lunch, and dinner. 9/20/19   Danny Eaton NP Insulin Needles, Disposable, 31 gauge x 5/16\" ndle by SubCUTAneous route Before breakfast, lunch, dinner and at bedtime. 9/20/19   Kristina CHARLES NP  
clopidogrel (PLAVIX) 75 mg tab Take 75 mg by mouth. Provider, Historical  
insulin glargine (LANTUS) 100 unit/mL injection 50 Units by SubCUTAneous route nightly. 2/6/19   Ambrose Dewitt MD  
Blood-Glucose Meter [facility] ULTRAMINI) monitoring kit Use as directed 4/14/15   Nicolas Carlson MD  
glucose blood VI test strips [facility] ULTRA TEST) strip Test 4 times daily as directed 4/14/15   Nicolas Carlson MD  
Lancets [facility] ULTRASOFT LANCETS) misc Test 4 times daily as directed 4/14/15   Nicolas Carlson MD  
losartan (COZAAR) 100 mg tablet Take  by mouth daily. 1/12/15   Provider, Historical  
rosuvastatin (CRESTOR) 20 mg tablet Take 1 Tab by mouth nightly. 3/9/15   Nicolas Carlson MD  
gabapentin (NEURONTIN) 100 mg capsule Take 1 Cap by mouth three (3) times daily. Patient taking differently: Take 100 mg by mouth two (2) times a day. 3/9/15   Nicolas Carlson MD  
rOPINIRole (REQUIP) 0.5 mg tablet Take 1 Tab by mouth nightly as needed. 4/21/14   Lavell Welch MD  
 
 
[facility] Medications: 
Current Facility-Administered Medications Medication Dose Route Frequency  0.9% sodium chloride infusion 250 mL  250 mL IntraVENous PRN  
 famotidine (PEPCID) tablet 20 mg  20 mg Oral DAILY  risperiDONE (RisperDAL) tablet 0.5 mg  0.5 mg Oral BID  furosemide (LASIX) injection 40 mg  40 mg IntraVENous ONCE  
 insulin regular (NOVOLIN R, HUMULIN R) injection   SubCUTAneous BID  [Held by provider] guanFACINE IR (TENEX) tablet 1 mg  1 mg Oral BID  insulin glargine (LANTUS) injection 8 Units  8 Units SubCUTAneous QHS  docusate (COLACE) 50 mg/5 mL oral liquid 100 mg  100 mg Per NG tube DAILY PRN  
 heparin (porcine) 1,000 unit/mL injection 5,000 Units  5,000 Units IntraVENous DIALYSIS PRN  
 alcohol 62% (NOZIN) nasal  1 Ampule  1 Ampule Topical Q12H  
 0.9% sodium chloride infusion 250 mL  250 mL IntraVENous PRN  
 heparin (porcine) injection 5,000 Units  5,000 Units SubCUTAneous Q8H  
 fentaNYL in normal saline (pf) 25 mcg/mL infusion  0-200 mcg/hr IntraVENous TITRATE  fentaNYL citrate (PF) injection 50 mcg  50 mcg IntraVENous Q2H PRN  
 bisacodyL (DULCOLAX) suppository 10 mg  10 mg Rectal DAILY PRN  
 NUTRITIONAL SUPPORT ELECTROLYTE PRN ORDERS   Does Not Apply PRN  
 [Held by provider] hydrALAZINE (APRESOLINE) tablet 10 mg  10 mg Per NG tube TID  dextrose 40% (GLUTOSE) oral gel 1 Tube  15 g Oral PRN  
 glucagon (GLUCAGEN) injection 1 mg  1 mg IntraMUSCular PRN  
 dextrose (D50W) injection syrg 12.5-25 g  25-50 mL IntraVENous PRN  
 albuterol (PROVENTIL HFA, VENTOLIN HFA, PROAIR HFA) inhaler 2 Puff  2 Puff Inhalation Q4H PRN  
 hydrALAZINE (APRESOLINE) 20 mg/mL injection 20 mg  20 mg IntraVENous Q2H PRN  phenol throat spray (CHLORASEPTIC) 1 Spray  1 Spray Oral PRN  
 lip protectant (BLISTEX) ointment 1 Each  1 Each Topical PRN  
 acetaminophen (TYLENOL) tablet 650 mg  650 mg Oral Q6H PRN  
 sodium chloride (NS) flush 5-40 mL  5-40 mL IntraVENous Q8H  
 sodium chloride (NS) flush 5-40 mL  5-40 mL IntraVENous PRN  
 albuterol (PROVENTIL VENTOLIN) nebulizer solution 2.5 mg  2.5 mg Nebulization Q6H RT  
 
 
Social History: 
Social History Tobacco Use  Smoking status: Never Smoker  Smokeless tobacco: Never Used Substance Use Topics  Alcohol use: Yes Comment: socially Pt denies any history of drug use, blood transfusions, or tattoos. Family History: 
Family History Problem Relation Age of Onset  Hypertension Mother  Cancer Mother  Diabetes Mother  Heart Disease Mother Review of Systems: A detailed 10 system ROS is obtained, with pertinent positives as listed above. All others are negative. Diet:  Ng tube feeds at 35 ml/hr Objective:  
 
Physical Exam: 
Vitals: 
Visit Vitals BP (!) 147/67 Pulse (!) 123 Temp 98.8 °F (37.1 °C) Resp 28 Ht 5' 5\" (1.651 m) Wt 75.2 kg (165 lb 12.6 oz) SpO2 95% BMI 27.59 kg/m² Gen:  Pt is not alert or oriented, no acute distress Skin:  Extremities and face reveal no rashes. HEENT: Sclerae anicteric. No oral ulcers. No abnormal pigmentation of the lips. . 
Cardiovascular: Regular rate and rhythm. No murmurs, gallops, or rubs. Respiratory:  Comfortable breathing with no accessory muscle use. Trach with vent intact. Clear breath sounds anteriorly with no wheezes, rales, or rhonchi. GI:  Abdomen nondistended, soft, and nontender. Normal active bowel sounds. No masses palpable. Rectal:  Deferred Musculoskeletal:  No pitting edema of the lower legs. Neurological:  Altered. Psychiatric:  Unable to assess. Lymphatic:  No cervical or supraclavicular adenopathy. Laboratory:   
Recent Labs  
  01/25/21 
9146 01/24/21 
0331 01/23/21 
0034 WBC 15.4* 15.2* 13.9* HGB 6.7* 7.4* 7.6* HCT 21.8* 24.3* 25.4*  
 248 253 MCV 97.8 98.8* 98.4*  140 139  
K 3.9 4.2 4.3  104 104 CO2 33* 34* 32 BUN 62* 52* 70* CREA 2.29* 2.02* 2.43* CA 9.1 9.0 9.2 MG 2.3  --   --   
* 160* 161* Assessment:  
 
Principal Problem: 
  Acute respiratory distress syndrome (ARDS) due to severe acute respiratory syndrome coronavirus 2 (SARS-CoV-2) ([facility] [address] (12/24/2020) Active Problems: 
  Diabetes mellitus with hyperosmolarity without hyperglycemic hyperosmolar nonketotic coma ([address] (11/24/2014) Coronary artery disease involving coronary bypass graft of native heart without angina pectoris (6/15/2015) Uncontrolled type 2 diabetes mellitus with hyperglycemia ([address] (6/15/2015) Acute on chronic renal failure ([address] (9/17/2019) COVID-19 (12/24/2020) Acute hypoxemic respiratory failure ([address] (12/24/2020) Cardiac arrest ([address] (12/30/2020) Plan:  
 
79 yo female with hx of COVID-19 infections and subsequent ARDS is seen today for evaluation for PEG placement. Hospital course complicated by MI and acute renal failure requiring intermittent dialysis. Trach placed at bedside 1/19 for inability to wean from the vent. She has had altered mental status felt likely related to COVID per neurology; MRI head and EEG pending today. Palliative care has discussed goals of care with family and although they have agreed to DNR status, they are not willing to proceed with only comfort measures. 1.  Appropriate for PEG placement 2. Tentative scheduled for 1/26 pending discussion with family and results of MRI and EEG ordered for today 3. NPO after midnight 4. Will hold sub-q heparin dose just prior to procedure Patient is seen and examined in collaboration with Dr. Stacey Guy. Assessment and plan as per Dr. Stacey Guy.  
Syed Humphreyss CHER

## 2021-01-26 NOTE — PROGRESS NOTES
Ventilator check complete; patient has a #6 tracheostomy. Patient is not sedated. Patient is not able to follow commands. Breath sounds are diminished. Trachea is midline, Negative for subcutaneous air, and chest excursion is symmetric. Patient is also Negative for cyanosis and is Negative for pitting edema. All alarms are set and audible. Resuscitation bag is at the head of the bed. Ventilator Settings Mode FIO2 Rate Tidal Volume Pressure PEEP I:E Ratio Pressure control  40 %   28  15  10 cm H20  1:1.6 Peak airway pressure: 24 cm H2O Minute ventilation: 11 l/min [address], [place]

## 2021-01-26 NOTE — PROGRESS NOTES
Neurology Daily Progress Note Assessment:  
 
78-year-old woman with recent cardiac arrest and acute respiratory distress syndrome secondary to COVID-19 infection. Neurology was consulted for altered mental status. Altered mental status is likely multifactorial but likely related to COVID-19 encephalopathy. EEG was consistent with encephalopathy, no evidence of seizures. Awaiting MRI. Plan: MRI brain to help with prognosis Neurology will continue to follow Subjective: Interval history: 
 
Patient intubated and sedated. Resists forced eye opening. Does not follow commands. EEG is slow, no epileptiform discharges. History: 
 
Malachi Johnson is a 78 y.o. female who is being seen for AMS. Unable to obtain ROS due to AMS. Objective:  
 
Vitals:  
 01/26/21 0943 01/26/21 0957 01/26/21 1012 01/26/21 1028 BP: (!) 154/70 137/63 (!) 143/71 (!) 150/71 Pulse: (!) 106 99 100 (!) 103 Resp: (!) 35 28 27 (!) 31 Temp:      
SpO2: 97% 97% 95% 96% Weight:      
Height:      
  
 
 
Current Facility-Administered Medications:  
  0.9% sodium chloride infusion 250 mL, 250 mL, IntraVENous, PRN, Emiliano Moran MD 
  famotidine (PEPCID) tablet 20 mg, 20 mg, Oral, DAILY, Diamond Love, NP, 20 mg at 01/26/21 6856   risperiDONE (RisperDAL) tablet 0.5 mg, 0.5 mg, Oral, BID, Diamond Love, NP, 0.5 mg at 01/26/21 4210   insulin regular (NOVOLIN R, HUMULIN R) injection, , SubCUTAneous, BID, Diamond Love, NP, 12 Units at 01/26/21 1790   [Held by provider] guanFACINE IR (TENEX) tablet 1 mg, 1 mg, Oral, BID, Janak Lowery MD, 1 mg at 01/22/21 8066 
  insulin glargine (LANTUS) injection 8 Units, 8 Units, SubCUTAneous, QHS, Janak Lowery MD, 8 Units at 01/25/21 2200 
  docusate (COLACE) 50 mg/5 mL oral liquid 100 mg, 100 mg, Per NG tube, DAILY PRN, Davin Stauffer MD 
 •  heparin (porcine) 1,000 unit/mL injection 5,000 Units, 5,000 Units, IntraVENous, DIALYSIS PRN, Nisreen Sales MD, 3,000 Units at 01/23/21 0911 
•  alcohol 62% (NOZIN) nasal  1 Ampule, 1 Ampule, Topical, Q12H, Lilian Gr MD, 1 Ampule at 01/26/21 0807 
•  0.9% sodium chloride infusion 250 mL, 250 mL, IntraVENous, PRN, Liudmila Combs MD 
•  heparin (porcine) injection 5,000 Units, 5,000 Units, SubCUTAneous, Q8H, Nasreen Ahuja MD, 5,000 Units at 01/26/21 0833 
•  fentaNYL in normal saline (pf) 25 mcg/mL infusion, 0-200 mcg/hr, IntraVENous, TITRATE, Funmilayo Hall MD, Last Rate: 4 mL/hr at 01/26/21 0823, 100 mcg/hr at 01/26/21 0823 
•  fentaNYL citrate (PF) injection 50 mcg, 50 mcg, IntraVENous, Q2H PRN, John Nails MD, 50 mcg at 01/25/21 0204 
•  bisacodyL (DULCOLAX) suppository 10 mg, 10 mg, Rectal, DAILY PRN, Cody Gottlieb MD, 10 mg at 01/01/21 1342 
•  NUTRITIONAL SUPPORT ELECTROLYTE PRN ORDERS, , Does Not Apply, PRN, Lilian Gr MD 
•  hydrALAZINE (APRESOLINE) tablet 10 mg, 10 mg, Per NG tube, TID, Christin Qiu, NP, Stopped at 12/30/20 0900 
•  dextrose 40% (GLUTOSE) oral gel 1 Tube, 15 g, Oral, PRN, John Nails MD, 1 Tube at 12/29/20 0532 
•  glucagon (GLUCAGEN) injection 1 mg, 1 mg, IntraMUSCular, PRN, John Nails MD 
•  dextrose (D50W) injection syrg 12.5-25 g, 25-50 mL, IntraVENous, PRN, John Nails MD, 12.5 g at 01/19/21 0747 
•  albuterol (PROVENTIL HFA, VENTOLIN HFA, PROAIR HFA) inhaler 2 Puff, 2 Puff, Inhalation, Q4H PRN, Jesus Gonzalez MD 
•  hydrALAZINE (APRESOLINE) 20 mg/mL injection 20 mg, 20 mg, IntraVENous, Q2H PRN, John Nails MD, 20 mg at 01/26/21 0549 
•  phenol throat spray (CHLORASEPTIC) 1 Spray, 1 Spray, Oral, PRN, Nasreen Ahuja MD, 1 Spray at 12/29/20 0549 
•  lip protectant (BLISTEX) ointment 1 Each, 1 Each, Topical, PRN, Tres Belcher MD, 1 Each at 01/26/21 0812 
   acetaminophen (TYLENOL) tablet 650 mg, 650 mg, Oral, Q6H PRN, Tahira Langford MD, 650 mg at 01/17/21 2151   sodium chloride (NS) flush 5-40 mL, 5-40 mL, IntraVENous, Q8H, Tres Belcher MD, 10 mL at 01/26/21 0545   sodium chloride (NS) flush 5-40 mL, 5-40 mL, IntraVENous, PRN, Daphnie Belcher MD, 20 mL at 01/07/21 0406   albuterol (PROVENTIL VENTOLIN) nebulizer solution 2.5 mg, 2.5 mg, Nebulization, Q6H RT, Tres Belcher MD, 2.5 mg at 01/26/21 1765 Recent Results (from the past 12 hour(s)) POC G3 Collection Time: 01/26/21  2:16 AM  
Result Value Ref Range Device: VENT    
 FIO2 (POC) 40 % pH (POC) 7.38 7.35 - 7.45    
 pCO2 (POC) 56.4 (H) 35 - 45 MMHG  
 pO2 (POC) 81 75 - 100 MMHG  
 HCO3 (POC) 33.5 (H) 22 - 26 MMOL/L  
 sO2 (POC) 95 95 - 98 % Base excess (POC) 7 mmol/L Mode ASSIST CONTROL Set Rate 28 bpm  
 PEEP/CPAP (POC) 10 cmH2O Allens test (POC) YES Inspiratory Time 0.80 sec Site RIGHT RADIAL Specimen type (POC) ARTERIAL Performed by Cj   
 CO2, POC 35 MMOL/L Critical value read back 02:15 Respiratory comment: NurseNotified Exhaled minute volume 11.00 L/min COLLECT TIME 210 METABOLIC PANEL, BASIC Collection Time: 01/26/21  3:45 AM  
Result Value Ref Range Sodium 139 136 - 145 mmol/L Potassium 4.1 3.5 - 5.1 mmol/L Chloride 103 98 - 107 mmol/L  
 CO2 34 (H) 21 - 32 mmol/L Anion gap 2 (L) 7 - 16 mmol/L Glucose 273 (H) 65 - 100 mg/dL BUN 72 (H) 8 - 23 MG/DL Creatinine 2.45 (H) 0.6 - 1.0 MG/DL  
 GFR est AA 24 (L) >60 ml/min/1.73m2 GFR est non-AA 20 (L) >60 ml/min/1.73m2 Calcium 9.7 8.3 - 10.4 MG/DL  
CBC WITH AUTOMATED DIFF Collection Time: 01/26/21  3:45 AM  
Result Value Ref Range WBC 16.9 (H) 4.3 - 11.1 K/uL  
 RBC 2.61 (L) 4.05 - 5.2 M/uL HGB 7.9 (L) 11.7 - 15.4 g/dL HCT 25.3 (L) 35.8 - 46.3 % MCV 96.9 79.6 - 97.8 FL  
 MCH 30.3 26.1 - 32.9 PG  
 MCHC 31.2 (L) 31.4 - 35.0 g/dL RDW 15.3 (H) 11.9 - 14.6 % PLATELET 674 902 - 928 K/uL MPV 11.0 9.4 - 12.3 FL ABSOLUTE NRBC 0.00 0.0 - 0.2 K/uL  
 DF AUTOMATED NEUTROPHILS 78 43 - 78 % LYMPHOCYTES 6 (L) 13 - 44 % MONOCYTES 10 4.0 - 12.0 % EOSINOPHILS 3 0.5 - 7.8 % BASOPHILS 1 0.0 - 2.0 % IMMATURE GRANULOCYTES 3 0.0 - 5.0 %  
 ABS. NEUTROPHILS 13.2 (H) 1.7 - 8.2 K/UL  
 ABS. LYMPHOCYTES 0.9 0.5 - 4.6 K/UL  
 ABS. MONOCYTES 1.7 (H) 0.1 - 1.3 K/UL  
 ABS. EOSINOPHILS 0.4 0.0 - 0.8 K/UL  
 ABS. BASOPHILS 0.1 0.0 - 0.2 K/UL  
 ABS. IMM. GRANS. 0.5 0.0 - 0.5 K/UL GLUCOSE, POC Collection Time: 01/26/21  7:58 AM  
Result Value Ref Range Glucose (POC) 331 (H) 65 - 100 mg/dL Physical Exam: 
General - Well developed, well nourished, in no apparent distress. Intubated and sedated. HEENT - Normocephalic, atraumatic. Conjunctiva are clear. Neck - Supple without masses Extremities - Peripheral pulses intact. No edema and no rashes. Neurological examination - Level of consciousness: Some spontaneous movements of the facial musculature. She does not follow commands. Resists forced eye opening  
  
Brain stem reflexes 
-Pupillary reflex to bright light: Equal, round, and reactive to light 
-Ciliospinal reflexes: Present 
-Oculovestibular and/or oculocephalic reflex response: Present 
-Corneal reflex: Present 
-Facial movement to painful stimulus at supraorbital nerve, temporomandibular joint: Present 
-Cough/gag reflex: Present 
  
Motor examination 
-Muscle tone: Normal muscle tone throughout. Occasional paratonia 
-Motor response to pain: No withdrawal 
-Muscle stretch reflexes: Trace reflexes throughout 
-Response to plantar stimulation: Downgoing to plantar stimulation Signed By: Tianna Cota NP   
 January 26, 2021

## 2021-01-26 NOTE — PROGRESS NOTES
[facility] PROGRESS NOTE Follow up for: PRATIMA Subjective:  
Patient seen and examined. Trached on vent. Not interactive. BUN/Cr stable and UOP is improving. ROS: 
Unable to obtain Objective:  
Exam: 
Vitals:  
 01/26/21 0943 01/26/21 0957 01/26/21 1012 01/26/21 1028 BP: (!) 154/70 137/63 (!) 143/71 (!) 150/71 Pulse: (!) 106 99 100 (!) 103 Resp: (!) 35 28 27 (!) 31 Temp:      
SpO2: 97% 97% 95% 96% Weight:      
Height:      
 
PE: 
General: trached on Vent. Lung: rhonchi bilaterally CV; Regular rate, S1, S2, no Rub Abd: soft, non tender, + BS Ext: trace edema Access: right temp line-intact Intake/Output Summary (Last 24 hours) at 1/26/2021 1043 Last data filed at 1/26/2021 9589 Gross per 24 hour Intake 1752.92 ml Output 1600 ml Net 152.92 ml Current Facility-Administered Medications Medication Dose Route Frequency  0.9% sodium chloride infusion 250 mL  250 mL IntraVENous PRN  
 famotidine (PEPCID) tablet 20 mg  20 mg Oral DAILY  risperiDONE (RisperDAL) tablet 0.5 mg  0.5 mg Oral BID  insulin regular (NOVOLIN R, HUMULIN R) injection   SubCUTAneous BID  [Held by provider] guanFACINE IR (TENEX) tablet 1 mg  1 mg Oral BID  insulin glargine (LANTUS) injection 8 Units  8 Units SubCUTAneous QHS  docusate (COLACE) 50 mg/5 mL oral liquid 100 mg  100 mg Per NG tube DAILY PRN  
 heparin (porcine) 1,000 unit/mL injection 5,000 Units  5,000 Units IntraVENous DIALYSIS PRN  
 alcohol 62% (NOZIN) nasal  1 Ampule  1 Ampule Topical Q12H  
 0.9% sodium chloride infusion 250 mL  250 mL IntraVENous PRN  
 heparin (porcine) injection 5,000 Units  5,000 Units SubCUTAneous Q8H  
 fentaNYL in normal saline (pf) 25 mcg/mL infusion  0-200 mcg/hr IntraVENous TITRATE  fentaNYL citrate (PF) injection 50 mcg  50 mcg IntraVENous Q2H PRN  
 bisacodyL (DULCOLAX) suppository 10 mg  10 mg Rectal DAILY PRN  
  NUTRITIONAL SUPPORT ELECTROLYTE PRN ORDERS   Does Not Apply PRN  
 hydrALAZINE (APRESOLINE) tablet 10 mg  10 mg Per NG tube TID  dextrose 40% (GLUTOSE) oral gel 1 Tube  15 g Oral PRN  
 glucagon (GLUCAGEN) injection 1 mg  1 mg IntraMUSCular PRN  
 dextrose (D50W) injection syrg 12.5-25 g  25-50 mL IntraVENous PRN  
 albuterol (PROVENTIL HFA, VENTOLIN HFA, PROAIR HFA) inhaler 2 Puff  2 Puff Inhalation Q4H PRN  
 hydrALAZINE (APRESOLINE) 20 mg/mL injection 20 mg  20 mg IntraVENous Q2H PRN  phenol throat spray (CHLORASEPTIC) 1 Spray  1 Spray Oral PRN  
 lip protectant (BLISTEX) ointment 1 Each  1 Each Topical PRN  
 acetaminophen (TYLENOL) tablet 650 mg  650 mg Oral Q6H PRN  
 sodium chloride (NS) flush 5-40 mL  5-40 mL IntraVENous Q8H  
 sodium chloride (NS) flush 5-40 mL  5-40 mL IntraVENous PRN  
 albuterol (PROVENTIL VENTOLIN) nebulizer solution 2.5 mg  2.5 mg Nebulization Q6H RT  
 
 
Recent Labs  
  01/26/21 
0345 01/25/21 
0337 01/24/21 
0331 WBC 16.9* 15.4* 15.2* HGB 7.9* 6.7* 7.4* HCT 25.3* 21.8* 24.3*  
 254 248 Recent Labs  
  01/26/21 
0345 01/25/21 
5376 01/24/21 
0331  141 140  
K 4.1 3.9 4.2  105 104 CO2 34* 33* 34* BUN 72* 62* 52* CREA 2.45* 2.29* 2.02* CA 9.7 9.1 9.0  
* 156* 160* MG  --  2.3  --   
PHOS  --  3.2  --   
 
 
Assessment and Plan: 1. PRATIMA on CKD SLED 1/23, BUN/Cr up slightly again today, but increasing UOP. Still think she is   on the cusp of recovery. Will continue hold HD for now. 2. Hyperkalemia  better 
  
3. COVID 19 +/acute respiratory failure/ARDs ventilated per pulm 4. Anemia- transfusion threshold per primary.  
 
Talon Barfield MD

## 2021-01-26 NOTE — PROGRESS NOTES
GI DAILY PROGRESS NOTE Admit Date: 12/24/2020 CC: feeding difficulties Subjective:  
 
Patient without purposeful movements. Does not open eyes when I called out her name. Medications: 
Current Facility-Administered Medications Medication Dose Route Frequency  0.9% sodium chloride infusion 250 mL  250 mL IntraVENous PRN  
 famotidine (PEPCID) tablet 20 mg  20 mg Oral DAILY  risperiDONE (RisperDAL) tablet 0.5 mg  0.5 mg Oral BID  insulin regular (NOVOLIN R, HUMULIN R) injection   SubCUTAneous BID  [Held by provider] guanFACINE IR (TENEX) tablet 1 mg  1 mg Oral BID  insulin glargine (LANTUS) injection 8 Units  8 Units SubCUTAneous QHS  docusate (COLACE) 50 mg/5 mL oral liquid 100 mg  100 mg Per NG tube DAILY PRN  
 heparin (porcine) 1,000 unit/mL injection 5,000 Units  5,000 Units IntraVENous DIALYSIS PRN  
 alcohol 62% (NOZIN) nasal  1 Ampule  1 Ampule Topical Q12H  
 0.9% sodium chloride infusion 250 mL  250 mL IntraVENous PRN  
 heparin (porcine) injection 5,000 Units  5,000 Units SubCUTAneous Q8H  
 fentaNYL in normal saline (pf) 25 mcg/mL infusion  0-200 mcg/hr IntraVENous TITRATE  fentaNYL citrate (PF) injection 50 mcg  50 mcg IntraVENous Q2H PRN  
 bisacodyL (DULCOLAX) suppository 10 mg  10 mg Rectal DAILY PRN  
 NUTRITIONAL SUPPORT ELECTROLYTE PRN ORDERS   Does Not Apply PRN  
 hydrALAZINE (APRESOLINE) tablet 10 mg  10 mg Per NG tube TID  dextrose 40% (GLUTOSE) oral gel 1 Tube  15 g Oral PRN  
 glucagon (GLUCAGEN) injection 1 mg  1 mg IntraMUSCular PRN  
 dextrose (D50W) injection syrg 12.5-25 g  25-50 mL IntraVENous PRN  
 albuterol (PROVENTIL HFA, VENTOLIN HFA, PROAIR HFA) inhaler 2 Puff  2 Puff Inhalation Q4H PRN  
 hydrALAZINE (APRESOLINE) 20 mg/mL injection 20 mg  20 mg IntraVENous Q2H PRN  phenol throat spray (CHLORASEPTIC) 1 Spray  1 Spray Oral PRN  
 lip protectant (BLISTEX) ointment 1 Each  1 Each Topical PRN  
  acetaminophen (TYLENOL) tablet 650 mg  650 mg Oral Q6H PRN  
 sodium chloride (NS) flush 5-40 mL  5-40 mL IntraVENous Q8H  
 sodium chloride (NS) flush 5-40 mL  5-40 mL IntraVENous PRN  
 albuterol (PROVENTIL VENTOLIN) nebulizer solution 2.5 mg  2.5 mg Nebulization Q6H RT  
 
 
Objective:  
Vitals: 
Visit Vitals /63 Pulse 99 Temp 97.8 °F (36.6 °C) Resp 28 Ht 5' 5\" (1.651 m) Wt 75.2 kg (165 lb 12.6 oz) SpO2 97% BMI 27.59 kg/m² Intake/Output: 
No intake/output data recorded. 01/24 1901 - 01/26 0700 In: 3085.6 [I.V.:221.6] Out: 1910 [Urine:1810; Drains:100] Exam: 
 
Data Review (Labs):   
Recent Results (from the past 24 hour(s)) GLUCOSE, POC Collection Time: 01/25/21  8:11 PM  
Result Value Ref Range Glucose (POC) 295 (H) 65 - 100 mg/dL POC G3 Collection Time: 01/26/21  2:16 AM  
Result Value Ref Range Device: VENT    
 FIO2 (POC) 40 % pH (POC) 7.38 7.35 - 7.45    
 pCO2 (POC) 56.4 (H) 35 - 45 MMHG  
 pO2 (POC) 81 75 - 100 MMHG  
 HCO3 (POC) 33.5 (H) 22 - 26 MMOL/L  
 sO2 (POC) 95 95 - 98 % Base excess (POC) 7 mmol/L Mode ASSIST CONTROL Set Rate 28 bpm  
 PEEP/CPAP (POC) 10 cmH2O Allens test (POC) YES Inspiratory Time 0.80 sec Site RIGHT RADIAL Specimen type (POC) ARTERIAL Performed by Cj   
 CO2, POC 35 MMOL/L Critical value read back 02:15 Respiratory comment: NurseNotified Exhaled minute volume 11.00 L/min COLLECT TIME 210 METABOLIC PANEL, BASIC Collection Time: 01/26/21  3:45 AM  
Result Value Ref Range Sodium 139 136 - 145 mmol/L Potassium 4.1 3.5 - 5.1 mmol/L Chloride 103 98 - 107 mmol/L  
 CO2 34 (H) 21 - 32 mmol/L Anion gap 2 (L) 7 - 16 mmol/L Glucose 273 (H) 65 - 100 mg/dL BUN 72 (H) 8 - 23 MG/DL Creatinine 2.45 (H) 0.6 - 1.0 MG/DL  
 GFR est AA 24 (L) >60 ml/min/1.73m2 GFR est non-AA 20 (L) >60 ml/min/1.73m2  Calcium 9.7 8.3 - 10.4 MG/DL  
 CBC WITH AUTOMATED DIFF Collection Time: 01/26/21  3:45 AM  
Result Value Ref Range WBC 16.9 (H) 4.3 - 11.1 K/uL  
 RBC 2.61 (L) 4.05 - 5.2 M/uL HGB 7.9 (L) 11.7 - 15.4 g/dL HCT 25.3 (L) 35.8 - 46.3 % MCV 96.9 79.6 - 97.8 FL  
 MCH 30.3 26.1 - 32.9 PG  
 MCHC 31.2 (L) 31.4 - 35.0 g/dL  
 RDW 15.3 (H) 11.9 - 14.6 % PLATELET 736 355 - 227 K/uL MPV 11.0 9.4 - 12.3 FL ABSOLUTE NRBC 0.00 0.0 - 0.2 K/uL  
 DF AUTOMATED NEUTROPHILS 78 43 - 78 % LYMPHOCYTES 6 (L) 13 - 44 % MONOCYTES 10 4.0 - 12.0 % EOSINOPHILS 3 0.5 - 7.8 % BASOPHILS 1 0.0 - 2.0 % IMMATURE GRANULOCYTES 3 0.0 - 5.0 %  
 ABS. NEUTROPHILS 13.2 (H) 1.7 - 8.2 K/UL  
 ABS. LYMPHOCYTES 0.9 0.5 - 4.6 K/UL  
 ABS. MONOCYTES 1.7 (H) 0.1 - 1.3 K/UL  
 ABS. EOSINOPHILS 0.4 0.0 - 0.8 K/UL  
 ABS. BASOPHILS 0.1 0.0 - 0.2 K/UL  
 ABS. IMM. GRANS. 0.5 0.0 - 0.5 K/UL GLUCOSE, POC Collection Time: 01/26/21  7:58 AM  
Result Value Ref Range Glucose (POC) 331 (H) 65 - 100 mg/dL EEG 1-25-21 Technical Summary: This EEG was performed with a 32-channel digital EEG machine with electrodes placed according to the international 10-20 system of placement.         
  
Background:  
During the maximal awake state no posterior dominant rhythm was seen. Anterior background consisted of medium amplitude activity in the primarily in the delta to theta range. Quasi-generalized periodic discharges are present for much of this recording at approximately 1 Hz. 
  
  
Hyperventilation: Hyperventilation was not performed due to medical condition 
  
Photic Stimulation: Photic stimulation was not performed due to medical condition 
  
Sleep: None 
  
Impression: The results of this EEG are abnormal.  There is slowing of the background consistent with a moderate to severe degree of encephalopathy, nonspecific in origin but representing global CNS dysfunction. There are no lateralizing features or epileptiform discharges. Assessment: Principal Problem: 
  Acute respiratory distress syndrome (ARDS) due to severe acute respiratory syndrome coronavirus 2 (SARS-CoV-2) ([facility] (12/24/2020) Active Problems: 
  Diabetes mellitus with hyperosmolarity without hyperglycemic hyperosmolar nonketotic coma ([facility] (11/24/2014) Coronary artery disease involving coronary bypass graft of native heart without angina pectoris (6/15/2015) Uncontrolled type 2 diabetes mellitus with hyperglycemia ([facility] (6/15/2015) Acute on chronic renal failure ([facility] (9/17/2019) COVID-19 (12/24/2020) Acute hypoxemic respiratory failure ([facility] (12/24/2020) Cardiac arrest ([facility] (12/30/2020) Plan:  
 
Feeding difficulties. As per my consult note 1-25-21, daughter requests (and I believe, needs) a discussion with various providers (eg Palliative, Neuro, Pulm) regarding patient's long term prognosis. With that information, the daughter will then have to decide whether the PEG would have been within the wishes of her mother, who verbally expressed her wishes to daughter before the cardiac arrest.  Therefore, will sign off for now. However, should the daughter still decide on the PEG, please contact us so we can re evaluate. Thank you.

## 2021-01-26 NOTE — PROGRESS NOTES
Ventilator check complete; patient has a #6 tracheostomy. Patient is not sedated. Patient is not able to follow commands. Breath sounds are diminished. Trachea is midline, Negative for subcutaneous air, and chest excursion is symmetrical. Patient is also Negative for cyanosis and is Negative for pitting edema. All alarms are set and audible. Resuscitation bag and mask are at the head of the bed.   
  
Ventilator Settings Mode FIO2 Rate Tidal Volume Pressure PEEP I:E Ratio Pressure control  40 %   28 400 ml  12 cm H2O  10 cm H20  1:1.68   
  
Peak airway pressure: 26 cm H2O Minute ventilation: 10.9 l/min

## 2021-01-26 NOTE — PROGRESS NOTES
Corie Jo Admission Date: 12/24/2020 Daily Progress Note: 1/26/2021 The patient's chart is reviewed and the patient is discussed with the staff. Admitted with acute respiratory failure secondary to COVID 19. Rx with Decadron and plasma. She was in renal failure on admission so did not get Remdesivir. She was initially on bipap but suffered with a cardiac arrest on 12/30 so was intubated at that time. She has remained vent dependent since then. Rx for an E coli UTI. Also grew MRSA in her sputum. Now off abx. Has acute on chronic renal failure - started on dialysis on 1/12 but held on 1/25 with increased urine output/? recovery. Trach placed 1/19. Subjective:  
  Sedated with Fentanyl and Risperdal. Restless and tachypneac. Not able to follow any commands. Restrained for safety. Current Facility-Administered Medications Medication Dose Route Frequency  0.9% sodium chloride infusion 250 mL  250 mL IntraVENous PRN  
 famotidine (PEPCID) tablet 20 mg  20 mg Oral DAILY  risperiDONE (RisperDAL) tablet 0.5 mg  0.5 mg Oral BID  insulin regular (NOVOLIN R, HUMULIN R) injection   SubCUTAneous BID  [Held by provider] guanFACINE IR (TENEX) tablet 1 mg  1 mg Oral BID  insulin glargine (LANTUS) injection 8 Units  8 Units SubCUTAneous QHS  docusate (COLACE) 50 mg/5 mL oral liquid 100 mg  100 mg Per NG tube DAILY PRN  
 heparin (porcine) 1,000 unit/mL injection 5,000 Units  5,000 Units IntraVENous DIALYSIS PRN  
 alcohol 62% (NOZIN) nasal  1 Ampule  1 Ampule Topical Q12H  
 0.9% sodium chloride infusion 250 mL  250 mL IntraVENous PRN  
 heparin (porcine) injection 5,000 Units  5,000 Units SubCUTAneous Q8H  
 fentaNYL in normal saline (pf) 25 mcg/mL infusion  0-200 mcg/hr IntraVENous TITRATE  fentaNYL citrate (PF) injection 50 mcg  50 mcg IntraVENous Q2H PRN  
 bisacodyL (DULCOLAX) suppository 10 mg  10 mg Rectal DAILY PRN  
  NUTRITIONAL SUPPORT ELECTROLYTE PRN ORDERS   Does Not Apply PRN  
 [Held by provider] hydrALAZINE (APRESOLINE) tablet 10 mg  10 mg Per NG tube TID  dextrose 40% (GLUTOSE) oral gel 1 Tube  15 g Oral PRN  
 glucagon (GLUCAGEN) injection 1 mg  1 mg IntraMUSCular PRN  
 dextrose (D50W) injection syrg 12.5-25 g  25-50 mL IntraVENous PRN  
 albuterol (PROVENTIL HFA, VENTOLIN HFA, PROAIR HFA) inhaler 2 Puff  2 Puff Inhalation Q4H PRN  
 hydrALAZINE (APRESOLINE) 20 mg/mL injection 20 mg  20 mg IntraVENous Q2H PRN  phenol throat spray (CHLORASEPTIC) 1 Spray  1 Spray Oral PRN  
 lip protectant (BLISTEX) ointment 1 Each  1 Each Topical PRN  
 acetaminophen (TYLENOL) tablet 650 mg  650 mg Oral Q6H PRN  
 sodium chloride (NS) flush 5-40 mL  5-40 mL IntraVENous Q8H  
 sodium chloride (NS) flush 5-40 mL  5-40 mL IntraVENous PRN  
 albuterol (PROVENTIL VENTOLIN) nebulizer solution 2.5 mg  2.5 mg Nebulization Q6H RT  
 
 
 
Objective:  
 
Vitals:  
 01/26/21 5592 01/26/21 0857 01/26/21 0913 01/26/21 3701 BP: (!) 143/70 (!) 143/71 (!) 152/69 (!) 150/69 Pulse: (!) 104 (!) 102 (!) 101 (!) 101 Resp: 29 25 29 29 Temp:      
SpO2: 96% 98% 98% 98% Weight:      
Height:      
 
Intake and Output:  
01/24 1901 - 01/26 0700 In: 3085.6 [I.V.:221.6] Out: 1910 [Urine:1810; Drains:100] No intake/output data recorded. Physical Exam:  
Constitutional:  the patient is well developed and in mild distress with tachypnea HEENT:  Sclera clear but with edema, pupils equal 
Lungs: rhonchi bilaterally. On vent - resp rate in the 30s Cardiovascular:  RRR without M,G,R. Sinus tach per telemetry Abd/GI: soft and no evidence of tenderness; with positive bowel sounds. Ext: warm without cyanosis. There is noted arm edema - none in legs Musculoskeletal: moves all four extremities with equal strength. Wearing mitts Skin:  no jaundice or rashes Neuro: Eyes partially open, not focused. Can't follow commands, restless Musculoskeletal: can't ambulate. No deformity 
Psychiatric: cannot assess 
 
Review of Systems - cannot assess due to mental status 
Lines: left neck central line, HD cath, coyle, flexiseal, NG, trach 
 
CHEST XRAY:  
 
 
LAB 
Recent Labs  
  01/26/21 0345 01/25/21 0337 01/24/21 
0331  
WBC 16.9* 15.4* 15.2*  
HGB 7.9* 6.7* 7.4*  
HCT 25.3* 21.8* 24.3*  
 254 248  
 
Recent Labs  
  01/26/21 
0345 01/25/21 0337 01/24/21 
0331  
 141 140  
K 4.1 3.9 4.2  
 105 104  
CO2 34* 33* 34*  
* 156* 160*  
BUN 72* 62* 52*  
CREA 2.45* 2.29* 2.02*  
MG  --  2.3  --   
PHOS  --  3.2  --   
 
Ventilator Settings 
Mode FIO2 Rate Tidal Volume Pressure PEEP  
Pressure control  40 %    400 ml  12 cm H2O  10 cm H20   
 
Peak airway pressure: 24 cm H2O  
Minute ventilation: 11 l/min  
 
ABG:  
Recent Labs  
  01/26/21 
0216 01/25/21 
0313 01/24/21 
0327  
PHI 7.38 7.35 7.34*  
PCO2I 56.4* 60.0* 61.1*  
PO2I 81 51* 99  
HCO3I 33.5* 32.8* 32.9*  
 
 
Assessment:  (Medical Decision Making)  
 
Hospital Problems  Date Reviewed: 8/10/2019  
       Codes Class Noted POA  
 Cardiac arrest (HCC) ICD-10-CM: I46.9 
ICD-9-CM: 427.5  12/30/2020 No  
   
 COVID-19 ICD-10-CM: U07.1 
ICD-9-CM: 079.89  12/24/2020 Yes  
   
 * (Principal) Acute respiratory distress syndrome (ARDS) due to severe acute respiratory syndrome coronavirus 2 (SARS-CoV-2) (HCC) ICD-10-CM: U07.1, J80 
ICD-9-CM: 518.82, 079.89  12/24/2020 No  
   
 Acute hypoxemic respiratory failure (HCC) ICD-10-CM: J96.01 
ICD-9-CM: 518.81  12/24/2020 Yes  
   
 Acute on chronic renal failure (HCC) ICD-10-CM: N17.9, N18.9 
ICD-9-CM: 584.9, 585.9  9/17/2019 Yes  
   
 Coronary artery disease involving coronary bypass graft of native heart without angina pectoris (Chronic) ICD-10-CM: I25.810 
ICD-9-CM: 414.05  6/15/2015 Yes  
   
 Uncontrolled type 2 diabetes mellitus with hyperglycemia (HCC) (Chronic) ICD-10-CM: E11.65 
ICD-9-CM: 250.02  6/15/2015 Yes  
   
 Diabetes mellitus with hyperosmolarity without hyperglycemic hyperosmolar nonketotic coma (HCC) (Chronic) ICD-10-CM: E11.00 ICD-9-CM: 250.20  11/24/2014 Yes Plan:  (Medical Decision Making) 1. Currently on PC - TV variable with restlessness. FIO2 at 40%. No weaning efforts due to tachypnea 2. Urine output 1.4 liters yesterday with 40 mg lasix. Labs stable. No dialysis yesterday. 3. Needs PEG -plans per GI - next week. Had to hold plavix 4. Flexiseal remains - 100 mls out over past 24 hours - hopefully remove soon 5. Transfused yesterday - hemoglobin up. Stool does not look bloody 6. Neurology has seen - EEG done. Moderate to severe encephalopathy. ? MRI. 7. DNR - palliative care seeing to help daughter with decision making 8. On Risperdal - 0.5 mg BID and Fentanyl at 100 mcgs. Problem is mostly tachypnea. ? Best medication to control. Hate to increase Risperdal with her mental status - narcotic may be the best choice for controlling respiratory rate Latoya Cooper NP More than 50% of time documented was spent face-to-face contact with the patient and in the care of the patient on the floor/unit where the patient is located. Lungs:  Trach in place. Tachypneic. Heart:  RRR with no Murmur/Rubs/Gallops Additional Comments:   
Patient with ongoing struggle with tachypnea, encephalopathy vs delirium. On Fentanyl currently and RR in the mid 30's. Down to 40% FiO2 and PEEP 10. Significant B infiltrates on CXR. These seem to have worsened starting on 1/20. Could be acute pulmonary edema vs covid related inflammatory changes. With renal improvement and increased UOP will monitor for any radiographic improvement. If her lung parenchyma clears her RR may improve as well. However, if her tachypnea is mostly neuro drive, this will have no effect on her RR. To get MRI today to evaluate this further. In the meantime, continue risperdal, fetnanyl drip. If needed could add precedex but try to hold off to allow adequate neuro exams. I have spoken with and examined the patient. I agree with the above assessment and plan as documented.  
 
Shelbie Reddy MD

## 2021-01-27 NOTE — PROGRESS NOTES
Ventilator check complete; patient has a #6.0 XLT SHILEY tracheostomy. Patient is sedated. Patient is not able to follow commands. Breath sounds are coarse. Trachea is midline, Negative for subcutaneous air, and chest excursion is symmetric. Patient is also Negative for cyanosis and is Negative for pitting edema. All alarms are set and audible. Resuscitation bag is at the head of the bed. Ventilator Settings Mode FIO2 Rate Tidal Volume Pressure PEEP I:E Ratio VC+  50 %  28  450 ml    10 cm H20  1:1.38 Peak airway pressure: 33 cm H2O Minute ventilation: 13.3 l/min Keith Davis RT

## 2021-01-27 NOTE — PROGRESS NOTES
Mignon Blunt Admission Date: 12/24/2020 Daily Progress Note: 1/27/2021 The patient's chart is reviewed and the patient is discussed with the staff. Admitted with acute respiratory failure secondary to COVID 19. Rx with Decadron and plasma. She was in renal failure on admission so did not get Remdesivir. She was initially on bipap but suffered with a cardiac arrest on 12/30 so was intubated at that time. She has remained vent dependent since then. Rx for an E coli UTI. Also grew MRSA in her sputum. Now off abx. Has acute on chronic renal failure - started on dialysis on 1/12 but held on 1/25 with increased urine output - nephrology has signed off. Trach placed 1/19. GI holding on PEG due to multiple medical problems - ? Change in category status. Neurology evaluating - abnormal EEG with moderate to severe slowing. MRI 1/26 - results pending. Subjective:  
  Have weaned Fentanyl some as respirations not as labored at present but has had a prn dose of 50 mcgs Fentanyl plus she is on scheduled Risperdal. Unresponsive at present. Blood pressure low overnight - apresoline held. Current Facility-Administered Medications Medication Dose Route Frequency  0.9% sodium chloride infusion 250 mL  250 mL IntraVENous PRN  
 famotidine (PEPCID) tablet 20 mg  20 mg Oral DAILY  risperiDONE (RisperDAL) tablet 0.5 mg  0.5 mg Oral BID  insulin regular (NOVOLIN R, HUMULIN R) injection   SubCUTAneous BID  [Held by provider] guanFACINE IR (TENEX) tablet 1 mg  1 mg Oral BID  insulin glargine (LANTUS) injection 8 Units  8 Units SubCUTAneous QHS  docusate (COLACE) 50 mg/5 mL oral liquid 100 mg  100 mg Per NG tube DAILY PRN  
 heparin (porcine) 1,000 unit/mL injection 5,000 Units  5,000 Units IntraVENous DIALYSIS PRN  
 alcohol 62% (NOZIN) nasal  1 Ampule  1 Ampule Topical Q12H  
 0.9% sodium chloride infusion 250 mL  250 mL IntraVENous PRN  
  heparin (porcine) injection 5,000 Units  5,000 Units SubCUTAneous Q8H  
 fentaNYL in normal saline (pf) 25 mcg/mL infusion  0-200 mcg/hr IntraVENous TITRATE  fentaNYL citrate (PF) injection 50 mcg  50 mcg IntraVENous Q2H PRN  
 bisacodyL (DULCOLAX) suppository 10 mg  10 mg Rectal DAILY PRN  
 NUTRITIONAL SUPPORT ELECTROLYTE PRN ORDERS   Does Not Apply PRN  
 hydrALAZINE (APRESOLINE) tablet 10 mg  10 mg Per NG tube TID  dextrose 40% (GLUTOSE) oral gel 1 Tube  15 g Oral PRN  
 glucagon (GLUCAGEN) injection 1 mg  1 mg IntraMUSCular PRN  
 dextrose (D50W) injection syrg 12.5-25 g  25-50 mL IntraVENous PRN  
 albuterol (PROVENTIL HFA, VENTOLIN HFA, PROAIR HFA) inhaler 2 Puff  2 Puff Inhalation Q4H PRN  
 hydrALAZINE (APRESOLINE) 20 mg/mL injection 20 mg  20 mg IntraVENous Q2H PRN  phenol throat spray (CHLORASEPTIC) 1 Spray  1 Spray Oral PRN  
 lip protectant (BLISTEX) ointment 1 Each  1 Each Topical PRN  
 acetaminophen (TYLENOL) tablet 650 mg  650 mg Oral Q6H PRN  
 sodium chloride (NS) flush 5-40 mL  5-40 mL IntraVENous Q8H  
 sodium chloride (NS) flush 5-40 mL  5-40 mL IntraVENous PRN  
 albuterol (PROVENTIL VENTOLIN) nebulizer solution 2.5 mg  2.5 mg Nebulization Q6H RT  
 
 
 
Objective:  
 
Vitals:  
 01/27/21 0846 01/27/21 0900 01/27/21 0915 01/27/21 0931 BP: (!) 112/52 (!) 95/47 (!) 85/48 (!) 84/46 Pulse: 100 96 92 91 Resp: 27 28 27 28 Temp:      
SpO2: 98% 98% 99% 99% Weight:      
Height:      
 
Intake and Output:  
01/25 1901 - 01/27 0700 In: 1310 [I.V.:156] Out: Booker Bowles [[facility]:[id number]] No intake/output data recorded. Physical Exam:  
Constitutional:  the patient is well developed and looks comfortable at present HEENT:  Sclera clear but with edema, pupils equal 
Lungs: rhonchi bilaterally -right > left. On vent - resp rate matched with vent rate of 28 Cardiovascular:  RRR without M,G,R. Sinus tach per telemetry Abd/GI: soft and no evidence of tenderness; with positive bowel sounds. Ext: warm without cyanosis. There is noted arm edema - none in legs Musculoskeletal: No spontaneous movement at present. Wearing mitts Skin:  no jaundice or rashes Neuro: Unresponsive at present. Musculoskeletal: can't ambulate. No deformity Psychiatric: cannot assess Review of Systems - cannot assess due to mental status Lines: left neck central line, HD cath, coyle, flexiseal, NG, trach CHEST XRAY:  
 
 
LAB Recent Labs  
  01/27/21 
0309 01/26/21 
0345 01/25/21 
3832 WBC 15.8* 16.9* 15.4* HGB 7.8* 7.9* 6.7* HCT 25.2* 25.3* 21.8*  
 252 254 Recent Labs  
  01/27/21 
0309 01/26/21 
0345 01/25/21 
0233  139 141  
K 3.9 4.1 3.9  103 105 CO2 36* 34* 33* * 273* 156* BUN 77* 72* 62* CREA 2.37* 2.45* 2.29* MG 2.4  --  2.3 PHOS 2.7  --  3.2 Ventilator Settings Mode FIO2 Rate Tidal Volume Pressure PEEP  
VC+  50 %    450 ml  12 cm H2O  10 cm H20 Peak airway pressure: 33 cm H2O Minute ventilation: 13.3 l/min ABG:  
Recent Labs  
  01/27/21 
0451 01/26/21 
0216 01/25/21 
7441 PHI 7.41 7.38 7.35  
PCO2I 56.5* 56.4* 60.0*  
PO2I 71* 81 51* HCO3I 35.6* 33.5* 32.8* Assessment:  (Medical Decision Making) Hospital Problems  Date Reviewed: 8/10/2019 Codes Class Noted POA Cardiac arrest [facility]) ICD-10-CM: I46.9 ICD-9-CM: 427.5  12/30/2020 No  
 ? Hypoxic brain injury. Neuro evaluating COVID-19 ICD-10-CM: U07.1 ICD-9-CM: 079.89  12/24/2020 Yes Now off isolation * (Principal) Acute respiratory distress syndrome (ARDS) due to severe acute respiratory syndrome coronavirus 2 (SARS-CoV-2) (HCC) ICD-10-CM: U07.1, J80 
ICD-9-CM: 518.82, 079.89  12/24/2020 No  
 Remains on vent - has not tolerated weaning efforts due to tachypnea but better today Acute hypoxemic respiratory failure ([facility] [address] ICD-10-CM: J96.01 
ICD-9-CM: 518.81  12/24/2020 Yes As above Acute on chronic renal failure (HCC) ICD-10-CM: N17.9, N18.9 ICD-9-CM: 584.9, 585.9  9/17/2019 Yes Now improved. Off dialysis. Labs stable. Blood pressure low today Coronary artery disease involving coronary bypass graft of native heart without angina pectoris (Chronic) ICD-10-CM: J93.402 ICD-9-CM: 414.05  6/15/2015 Yes Uncontrolled type 2 diabetes mellitus with hyperglycemia (HCC) (Chronic) ICD-10-CM: E11.65 ICD-9-CM: 250.02  6/15/2015 Yes BS up to 300s yesterday Diabetes mellitus with hyperosmolarity without hyperglycemic hyperosmolar nonketotic coma (HCC) (Chronic) ICD-10-CM: E11.00 ICD-9-CM: 250.20  11/24/2014 Yes Plan:  (Medical Decision Making) 1. Currently on PC, rate 28 which she is matched with right now. Will decrease vent rate to 15 and reassess. Have asked nurse to wean the fentanyl and to hold the Risperdal if needed. She looks more comfortable today but she did get an IV dose of Fentanyl earlier this AM. CXR improved today with diuresis 2. Urine output neg 500 mls from yesterday with 40 mg lasix. Labs stable. Off dialysis and catheter to be removed. Blood pressure is low today so hold on any further lasix today and hold apresoline. 3. Needs PEG if aggressive care continued. GI on standby 4. Flexiseal remains - no output recorded overnight. Will ask nurse to remove if not needed 5. Transfused 1/25 - hemoglobin up. No evidence of bleeding 6. Neurology has seen - EEG done. Moderate to severe encephalopathy. MRI yesterday - results pending. 7. DNR - palliative care seeing to help daughter with decision making. Spoke with daughter on the phone today - she is available to meet this afternoon and Dr. Chalino Arana can meet at one. She does not want to continue aggressive care if the medical staff does not think she will recover. 8. Lovenox/Pepcid 9. Increase lantus for hyperglycemia Sharee Suarez NP 
 
 More than 50% of time documented was spent face-to-face contact with the patient and in the care of the patient on the floor/unit where the patient is located. Lungs:  Rhonchi, cleared with suction, 50%, RR down to 20s Heart:  RRR with no Murmur/Rubs/Gallops Additional Comments:  COVID encephalopathy seems severe. Doesn't track. Will stop fentanyl gtt. Will start Methadone given long opioid use. Try to wean more as able. Will try to hold risperidone if remains calm as well. Family meeting planned for this afternoon around 3PM. 
 
I have spoken with and examined the patient. I agree with the above assessment and plan as documented.  
 
Meet Andrea MD

## 2021-01-27 NOTE — PROGRESS NOTES
[facility] PROGRESS NOTE Follow up for: PRATIMA Subjective:  
Patient seen and examined. Trached on vent. Not interactive. BUN/Cr stable with more than 1L of UOP. 
 
 
ROS: 
Unable to obtain Objective:  
Exam: 
Vitals:  
 01/27/21 0800 01/27/21 0815 01/27/21 0817 01/27/21 0830 BP: 137/61 (!) 122/59  (!) 108/55 Pulse: (!) 112 (!) 110 (!) 113 (!) 110 Resp: (!) 34 22 (!) 31 12 Temp:      
SpO2: 99% 98% 99% 96% Weight:      
Height:      
 
PE: 
General: trached on Vent. Lung: rhonchi bilaterally CV; Regular rate, S1, S2, no Rub Abd: soft, non tender, + BS Ext: trace edema Access: right temp line-intact Intake/Output Summary (Last 24 hours) at 1/27/2021 6447 Last data filed at 1/27/2021 8548 Gross per 24 hour Intake 633.82 ml Output 1050 ml Net -416.18 ml Current Facility-Administered Medications Medication Dose Route Frequency  0.9% sodium chloride infusion 250 mL  250 mL IntraVENous PRN  
 famotidine (PEPCID) tablet 20 mg  20 mg Oral DAILY  risperiDONE (RisperDAL) tablet 0.5 mg  0.5 mg Oral BID  insulin regular (NOVOLIN R, HUMULIN R) injection   SubCUTAneous BID  [Held by provider] guanFACINE IR (TENEX) tablet 1 mg  1 mg Oral BID  insulin glargine (LANTUS) injection 8 Units  8 Units SubCUTAneous QHS  docusate (COLACE) 50 mg/5 mL oral liquid 100 mg  100 mg Per NG tube DAILY PRN  
 heparin (porcine) 1,000 unit/mL injection 5,000 Units  5,000 Units IntraVENous DIALYSIS PRN  
 alcohol 62% (NOZIN) nasal  1 Ampule  1 Ampule Topical Q12H  
 0.9% sodium chloride infusion 250 mL  250 mL IntraVENous PRN  
 heparin (porcine) injection 5,000 Units  5,000 Units SubCUTAneous Q8H  
 fentaNYL in normal saline (pf) 25 mcg/mL infusion  0-200 mcg/hr IntraVENous TITRATE  fentaNYL citrate (PF) injection 50 mcg  50 mcg IntraVENous Q2H PRN  
 bisacodyL (DULCOLAX) suppository 10 mg  10 mg Rectal DAILY PRN  
  NUTRITIONAL SUPPORT ELECTROLYTE PRN ORDERS   Does Not Apply PRN  
 hydrALAZINE (APRESOLINE) tablet 10 mg  10 mg Per NG tube TID  dextrose 40% (GLUTOSE) oral gel 1 Tube  15 g Oral PRN  
 glucagon (GLUCAGEN) injection 1 mg  1 mg IntraMUSCular PRN  
 dextrose (D50W) injection syrg 12.5-25 g  25-50 mL IntraVENous PRN  
 albuterol (PROVENTIL HFA, VENTOLIN HFA, PROAIR HFA) inhaler 2 Puff  2 Puff Inhalation Q4H PRN  
 hydrALAZINE (APRESOLINE) 20 mg/mL injection 20 mg  20 mg IntraVENous Q2H PRN  phenol throat spray (CHLORASEPTIC) 1 Spray  1 Spray Oral PRN  
 lip protectant (BLISTEX) ointment 1 Each  1 Each Topical PRN  
 acetaminophen (TYLENOL) tablet 650 mg  650 mg Oral Q6H PRN  
 sodium chloride (NS) flush 5-40 mL  5-40 mL IntraVENous Q8H  
 sodium chloride (NS) flush 5-40 mL  5-40 mL IntraVENous PRN  
 albuterol (PROVENTIL VENTOLIN) nebulizer solution 2.5 mg  2.5 mg Nebulization Q6H RT  
 
 
Recent Labs  
  01/27/21 
0309 01/26/21 
0345 01/25/21 
9145 WBC 15.8* 16.9* 15.4* HGB 7.8* 7.9* 6.7* HCT 25.2* 25.3* 21.8*  
 252 254 Recent Labs  
  01/27/21 
0309 01/26/21 
0345 01/25/21 
4593  139 141  
K 3.9 4.1 3.9  103 105 CO2 36* 34* 33* BUN 77* 72* 62* CREA 2.37* 2.45* 2.29* CA 9.8 9.7 9.1 * 273* 156* MG 2.4  --  2.3 PHOS 2.7  --  3.2 Assessment and Plan: 1. PRATIMA on CKD- Last SLED on 1/23. BUN/Cr are now stable with good UOP. Hopefully does not require further HD. Continue to monitor renal function and UOP. 2. Hyperkalemia  better 
  
3. COVID 19 +/acute respiratory failure/ARDs ventilated per pulm 4. Anemia- transfusion threshold per primary.  
 
Cindy Randle MD

## 2021-01-27 NOTE — PROGRESS NOTES
Neurology Daily Progress Note Assessment:  
 
53-year-old woman with recent cardiac arrest and acute respiratory distress syndrome secondary to COVID-19 infection. Neurology was consulted for altered mental status. 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 EEG was consistent with encephalopathy, no evidence of seizures. No evidence of anoxic injury on MRI. The long-term outcomes in patients with encephalopathy in the setting of COVID-19 remains unclear. It appears that some patients do make a meaningful recovering. As with other acutely ill patients, encephalopathy can persist for weeks after resolution of the acute illness. However in this situation, this must be taken into consideration with the patient's other risk factors, including respiratory status and cardiac arrest history. Plan:  
 
Continue to wean sedation as able Continue supportive care Plan for family meeting today to discuss goals of care Subjective: Interval history: 
 
Patient intubated and sedated. Does not respond to voice or follow commands. Responds to pain. History: 
 
Mignon Stephens is a 78 y.o. female who is being seen for AMS. Unable to obtain ROS due to AMS. Objective:  
 
Vitals:  
 01/27/21 0900 01/27/21 0915 01/27/21 0931 01/27/21 6876 BP: (!) 95/47 (!) 85/48 (!) 84/46 (!) 48/77 Pulse: 96 92 91 87 Resp: 28 27 28 27 Temp:      
SpO2: 98% 99% 99% 100% Weight:      
Height:      
  
 
 
Current Facility-Administered Medications:  
  hydrALAZINE (APRESOLINE) 20 mg/mL injection 10 mg, 10 mg, IntraVENous, Q6H PRN, Yany Wilson NP 
  insulin glargine (LANTUS) injection 20 Units, 20 Units, SubCUTAneous, QHS, Yany Wilson NP 
  methadone (DOLOPHINE) tablet 10 mg, 10 mg, Oral, BID, Lilian Gr MD, 10 mg at 01/27/21 1151 
  0.9% sodium chloride infusion 250 mL, 250 mL, IntraVENous, PRN, Navid Moran, MD 
  famotidine (PEPCID) tablet 20 mg, 20 mg, Oral, DAILY, Rhea Crews NP, 20 mg at 01/27/21 0825 
  risperiDONE (RisperDAL) tablet 0.5 mg, 0.5 mg, Oral, BID, Rhea Crews NP, 0.5 mg at 01/27/21 0825 
  insulin regular (NOVOLIN R, HUMULIN R) injection, , SubCUTAneous, BID, Rhea Crews NP, 9 Units at 01/27/21 0250   [Held by provider] guanFACINE IR (TENEX) tablet 1 mg, 1 mg, Oral, BID, Cary Kevin MD, 1 mg at 01/22/21 6243   docusate (COLACE) 50 mg/5 mL oral liquid 100 mg, 100 mg, Per NG tube, DAILY PRN, Brenda Torres MD 
  heparin (porcine) 1,000 unit/mL injection 5,000 Units, 5,000 Units, IntraVENous, DIALYSIS PRN, Amee Bernard MD, 3,000 Units at 01/23/21 0911 
  alcohol 62% (NOZIN) nasal  1 Ampule, 1 Ampule, Topical, Q12H, SineLilian MD, 1 Ampule at 01/27/21 0825 
  0.9% sodium chloride infusion 250 mL, 250 mL, IntraVENous, PRN, Darcy Watkins MD 
  heparin (porcine) injection 5,000 Units, 5,000 Units, SubCUTAneous, Q8H, Arlin Christensen MD, 5,000 Units at 01/27/21 0825 
  fentaNYL citrate (PF) injection 50 mcg, 50 mcg, IntraVENous, Q2H PRN, Maxine Clemens MD, 50 mcg at 01/27/21 1205   bisacodyL (DULCOLAX) suppository 10 mg, 10 mg, Rectal, DAILY PRN, Brenda Torres MD, 10 mg at 01/01/21 1342 
  NUTRITIONAL SUPPORT ELECTROLYTE PRN ORDERS, , Does Not Apply, PRN, Shavon Espinoza MD 
  [Held by provider] hydrALAZINE (APRESOLINE) tablet 10 mg, 10 mg, Per NG tube, TID, Rhea Crews NP, Stopped at 01/26/21 2244   dextrose 40% (GLUTOSE) oral gel 1 Tube, 15 g, Oral, PRN, Maxine Clemens MD, 1 Tube at 12/29/20 0532 
  glucagon (GLUCAGEN) injection 1 mg, 1 mg, IntraMUSCular, PRN, Raheel Nails MD 
  dextrose (D50W) injection syrg 12.5-25 g, 25-50 mL, IntraVENous, PRN, Maxine Clemens MD, 12.5 g at 01/19/21 0747 
  albuterol (PROVENTIL HFA, VENTOLIN HFA, PROAIR HFA) inhaler 2 Puff, 2 Puff, Inhalation, Q4H PRN, Bella Waddell MD 
  phenol throat spray (CHLORASEPTIC) 1 Spray, 1 Spray, Oral, PRN, Larry Gil MD, 1 [place] at 12/29/20 1621   lip protectant (BLISTEX) ointment 1 Each, 1 Each, Topical, PRN, Venessa Belcher MD, 1 Each at 01/26/21 1386   acetaminophen (TYLENOL) tablet 650 mg, 650 mg, Oral, Q6H PRN, Larry Gil MD, 650 mg at 01/17/21 2151   sodium chloride (NS) flush 5-40 mL, 5-40 mL, IntraVENous, Q8H, Tres Belcher MD, 40 mL at 01/27/21 0515   sodium chloride (NS) flush 5-40 mL, 5-40 mL, IntraVENous, PRN, Ye, Venessa Parker MD, 20 mL at 01/07/21 0406   albuterol (PROVENTIL VENTOLIN) nebulizer solution 2.5 mg, 2.5 mg, Nebulization, Q6H RT, Tres Belcher MD, 2.5 mg at 01/27/21 0403 Recent Results (from the past 12 hour(s)) METABOLIC PANEL, BASIC Collection Time: 01/27/21  3:09 AM  
Result Value Ref Range Sodium 139 136 - 145 mmol/L Potassium 3.9 3.5 - 5.1 mmol/L Chloride 101 98 - 107 mmol/L  
 CO2 36 (H) 21 - 32 mmol/L Anion gap 2 (L) 7 - 16 mmol/L Glucose 345 (H) 65 - 100 mg/dL BUN 77 (H) 8 - 23 MG/DL Creatinine 2.37 (H) 0.6 - 1.0 MG/DL  
 GFR est AA 25 (L) >60 ml/min/1.73m2 GFR est non-AA 21 (L) >60 ml/min/1.73m2 Calcium 9.8 8.3 - 10.4 MG/DL  
PHOSPHORUS Collection Time: 01/27/21  3:09 AM  
Result Value Ref Range Phosphorus 2.7 2.3 - 3.7 MG/DL MAGNESIUM Collection Time: 01/27/21  3:09 AM  
Result Value Ref Range Magnesium 2.4 1.8 - 2.4 mg/dL CBC WITH AUTOMATED DIFF Collection Time: 01/27/21  3:09 AM  
Result Value Ref Range WBC 15.8 (H) 4.3 - 11.1 K/uL  
 RBC 2.62 (L) 4.05 - 5.2 M/uL HGB 7.8 (L) 11.7 - 15.4 g/dL HCT 25.2 (L) 35.8 - 46.3 % MCV 96.2 79.6 - 97.8 FL  
 MCH 29.8 26.1 - 32.9 PG  
 MCHC 31.0 (L) 31.4 - 35.0 g/dL  
 RDW 15.1 (H) 11.9 - 14.6 % PLATELET 960 320 - 620 K/uL MPV 10.8 9.4 - 12.3 FL ABSOLUTE NRBC 0.00 0.0 - 0.2 K/uL  
 DF AUTOMATED NEUTROPHILS 83 (H) 43 - 78 % LYMPHOCYTES 6 (L) 13 - 44 % MONOCYTES 7 4.0 - 12.0 %  EOSINOPHILS 1 0.5 - 7.8 % BASOPHILS 0 0.0 - 2.0 % IMMATURE GRANULOCYTES 3 0.0 - 5.0 %  
 ABS. NEUTROPHILS 13.1 (H) 1.7 - 8.2 K/UL  
 ABS. LYMPHOCYTES 0.9 0.5 - 4.6 K/UL  
 ABS. MONOCYTES 1.1 0.1 - 1.3 K/UL  
 ABS. EOSINOPHILS 0.2 0.0 - 0.8 K/UL  
 ABS. BASOPHILS 0.1 0.0 - 0.2 K/UL  
 ABS. IMM. GRANS. 0.4 0.0 - 0.5 K/UL GLUCOSE, POC Collection Time: 01/27/21  3:23 AM  
Result Value Ref Range Glucose (POC) 329 (H) 65 - 100 mg/dL POC G3 Collection Time: 01/27/21  4:51 AM  
Result Value Ref Range Device: VENT    
 FIO2 (POC) 50 % pH (POC) 7.41 7.35 - 7.45    
 pCO2 (POC) 56.5 (H) 35 - 45 MMHG  
 pO2 (POC) 71 (L) 75 - 100 MMHG  
 HCO3 (POC) 35.6 (H) 22 - 26 MMOL/L  
 sO2 (POC) 94 (L) 95 - 98 % Base excess (POC) 10 mmol/L Mode Pressure regulated volume control Tidal volume 450 ml Set Rate 28 bpm  
 PEEP/CPAP (POC) 10 cmH2O Allens test (POC) YES Site RIGHT RADIAL Specimen type (POC) ARTERIAL Performed by Cj   
 CO2, POC 37 MMOL/L Critical value read back 04:50 Respiratory comment: NurseNotified Exhaled minute volume 13.10 L/min COLLECT TIME 445 GLUCOSE, POC Collection Time: 01/27/21  7:34 AM  
Result Value Ref Range Glucose (POC) 281 (H) 65 - 100 mg/dL MRI Results (most recent): 
Results from Hospital Encounter encounter on 12/24/20 MRI BRAIN WO CONT Narrative Exam: MRI BRAIN WO CONT on 1/27/2021 12:06 PM 
 
Clinical History: The Female patient is 78years old presenting for Post cardiac 
arrest and unresponsive. COVID confirmed. Comparison:  Head CT 1/20/2021 Technique:  Axial T2, axial FLAIR, axial diffusion-weighted,  sagittal T1 and 
coronal gradient-echo scans were performed. Findings:  
 
Age-related cortical involutional changes are seen with sulcal and ventricular 
prominence. There is no evidence of acute intracranial ischemia.  Remote ischemic 
changes are noted throughout the inferior right temporal occipital lobe with 
focal encephalomalacia, and gliosis as well as slight laminar necrosis. Chronic 
lacunae are seen throughout the basal ganglia and thalami and minimally the 
corin. There are no abnormal extra-axial fluid collections. No evidence of mass or mass 
effect is seen. There is no diffusion signal abnormality. Expected flow voids 
are maintained in the major intracranial vessels. The cerebellum is otherwise unremarkable. There is no evidence of Chiari 
malformation. The ventricular system and CSF containing spaces are unremarkable in appearance. Visualized extracranial soft tissues are unremarkable. The paranasal sinuses are well pneumatized and aerated. Impression 1. Stable age-related senescent changes and chronic microvascular disease with 
remote right posterior temporal occipital ischemic insult. CPT code(s) [id number] Physical Exam: 
General - Well developed, well nourished, in no apparent distress. Intubated and sedated. HEENT - Normocephalic, atraumatic. Conjunctiva are clear. Neck - Supple without masses Extremities - Peripheral pulses intact. No edema and no rashes. Neurological examination - Level of consciousness: Some spontaneous movements of the facial musculature. She does not follow commands. Resists forced eye opening  
  
Brain stem reflexes 
-Pupillary reflex to bright light: Equal, round, and reactive to light 
-Ciliospinal reflexes: Present 
-Oculovestibular and/or oculocephalic reflex response: Present 
-Corneal reflex: Present 
-Facial movement to painful stimulus at supraorbital nerve, temporomandibular joint: Present 
-Cough/gag reflex: Present 
  
Motor examination 
-Muscle tone: Normal muscle tone throughout. Occasional paratonia 
-Motor response to pain: responds to pain  
-Muscle stretch reflexes: Trace reflexes throughout 
-Response to plantar stimulation: Downgoing to plantar stimulation Signed By: Serg Garza NP   
 January 27, 2021

## 2021-01-27 NOTE — PROGRESS NOTES
Pt off unit for MRI 17:09 to 18:42. Prior to scan, did give 40 mg Lasix IV per MD for pulm edema and pt comfort while lying flat for MRI. During MRI, pt given total of 100 mcg Fentanyl for sedation, and pt was paralyzed with 80 mg Rocuronium to achieve optimal picture clarity due to pt's persistent nonpurposeful movement. Otherwise, MRI uneventful and VSS.

## 2021-01-27 NOTE — PROGRESS NOTES
Ventilator check complete; patient has a #6 tracheostomy. Patient is not sedated. Patient is not able to follow commands. Breath sounds are diminished. Trachea is midline, Negative for subcutaneous air, and chest excursion is symmetrical. Patient is also Negative for cyanosis and is Negative for pitting edema. All alarms are set and audible. Resuscitation bag and mask are at bedside.   
  
Ventilator Settings Mode FIO2 Rate Tidal Volume Pressure PEEP I:E Ratio PRVC 50 %   28 450   10 cm H20  1:1.4   
  
Peak airway pressure: 39 cm H2O Minute ventilation: 14 l/min

## 2021-01-27 NOTE — PROGRESS NOTES
Palliative Care Progress Note Patient: Beverly Carver MRN: [id number]  SSN: [id number] YOB: 1940  Age: 78 y.o. Sex: female Assessment/Plan: Chief Complaint/Interval History: eyes open but does not respond. Remains ventilated via trach Principal Diagnosis:   
· Encephalopathy, Unspecified  G93.40 Additional Diagnoses: · Acute Respiratory Failure, Unspecified  J96.00 · Debility, Unspecified  R53.81 
· Failure to Thrive  R62.7 · Fatigue, Lethargy  R53.83 
· Frailty  R54 · Counseling, Encounter for Medical Advice  Z71.9 
· Encounter for Palliative Care  Z51.5 Palliative Performance Scale (PPS) Medical Decision Making:  
Reviewed and summarized notes over last week Discussed case with appropriate providers Reviewed laboratory and x-ray data over last week Pt resting in bed, eyes open but not interactive. She remains ventilated via trach. Spoke with oldest daughter via zoom call with Dr. Cristela Harrell as well. Pt other daughter on zoom call but was unable to get audio working so unsure if she was able to hear conversation. Reviewed concerns due to extensive hospitalization and ongoing encephalopathy. Prognosis poor for meaningful recovery. Answered questions from Zita Smith. They plan to discuss direction further with family. Will follow and be available for family discussions. Will discuss findings with members of the interdisciplinary team.   
 
  
More than 50% of this 35 minute visit was spent counseling and coordination of care as outlined above. Subjective:  
 
Review of Systems: 
Review of systems not obtained due to patient factors- AMS Objective:  
 
Visit Vitals BP (!) 84/48 Pulse 84 Temp 98.6 °F (37 °C) Resp 21 Ht 5' 5\" (1.651 m) Wt 156 lb 12 oz (71.1 kg) SpO2 100% BMI 26.08 kg/m² Physical Exam: 
 
General:  Poorly responsive. Debilitated and frail Eyes:  Conjunctivae/corneas clear Nose: Nares normal. Septum midline. Neck: Supple, symmetrical, trachea midline. Trach with vent Lungs:   Coarse bilaterally, unlabored Heart:  Regular rate and rhythm Abdomen:   Soft, non-tender, non-distended Extremities: Normal, atraumatic, no cyanosis or edema Skin: Skin color, texture, turgor normal  
Neurologic: Does not follow commands Psych: Alert and oriented Signed By: Efraín Blanco MD   
 January 27, 2021

## 2021-01-27 NOTE — PROGRESS NOTES
Spiritual Care Visit, initial visit. Visited with patient at bedside. Patient on trach; did not respond. Prayed for patient's healing and health. Left a card at bedside. Visit by Pavel Lancaster, Staff .  Pelon., Odalys.B., B.A.

## 2021-01-27 NOTE — PROGRESS NOTES
Comprehensive Nutrition Assessment Type and Reason for Visit: Mai Curtis Tube Feeding Management (Pulmonary) Nutrition Recommendations/Plan:  
· Enteral Nutrition: · Modify current enteral regimen to decrease protein provisions as pt is no longer on SLED:  
· Nepro via OGT at goal rate of 35ml/hr. · Decrease to 1 packet Prosource TF to be given at 1800 with 50ml free water flush before and after. · Water flush to 65ml Q4H. · New regimen will provide 1552kcal (100% estimated calorie needs), 84gm protein (100% estimated protein needs), and 1101ml free fluid (renal standard). Malnutrition Assessment: 
Malnutrition Status: Insufficient data Nutrition Assessment:  
Nutrition History: 12/31: 3 recorded meals in past 6 days with average intake of 83% Nutrition Background: H/O: CAD, HTN, DM, HLD, peripheral neuropathy, CKD stage III. Presented with dyspnea. Findings of +COVID. Admitted to ICU with ARDS, severe acute hypoxic due to COVID PNA, PRATIMA on CKD. Was requiring BIPAP at night and Aviro during day. Had brief cardiac arrest and was intubated 12/30 Daily Update: 
Last SLED 1/23, nephrology indicates hopefully will not require more HD - nutrition needs adjusted to reflect change. Palliative care following, goals of care pending, potential PEG. Pt remains deeply encephalopathic. Abdominal Status (last documented): Diarrhea, Passing flatus, Soft, Obese abdomen with Active  bowel sounds. Last BM 01/26/21. Pertinent Medications: pepcid, lantus, SSI, Pertinent Labs:  
Lab Results Component Value Date/Time  Sodium 139 01/27/2021 03:09 AM  
 Potassium 3.9 01/27/2021 03:09 AM  
 Chloride 101 01/27/2021 03:09 AM  
 CO2 36 (H) 01/27/2021 03:09 AM  
 Anion gap 2 (L) 01/27/2021 03:09 AM  
 Glucose 345 (H) 01/27/2021 03:09 AM  
 BUN 77 (H) 01/27/2021 03:09 AM  
 Creatinine 2.37 (H) 01/27/2021 03:09 AM  
 Calcium 9.8 01/27/2021 03:09 AM  
 Albumin 3.0 (L) 01/06/2021 02:02 AM  
 Phosphorus 2.7 01/27/2021 03:09 AM  
 
Nutrition Related Findings:  
Intubated and OGT placed 12/30. CRRT initiated on 1/12. TF formula changed to a renal formula on 1/11. s/p Trach 1/19, NG placed 1/19. Current Nutrition Therapies: DIET TUBE FEEDING Administer 3 pouches Prosource TF via open syringe into FT @ 1800 daily. Flush FT with 50 ml water before and after administration of protein. DIET NPO Current Tube Feeding (TF) Orders: · Feeding Route: Nasogastric · Formula: Nepro · Schedule:Continuous · Regimen: 35 ml/hr · Additives/Modulars: Protein(3 packets prosource tube feeding q day) · Water Flushes: 65 ml q 4 hours · Current TF & Flush Orders Provides: infusing at goal 
· Goal TF & Flush Orders Provides: 1632 kcal (100% kcal needs), 101 gm PRO (100% estimated protein needs), 1059 ml fluid/day (100% of fluid needs) Current Intake: Average Meal Intake: NPO Average Supplement Intake: NPO Additional Caloric Sources: ( ) Anthropometric Measures: 
Height: 5' 5\" (165.1 cm) Current Body Wt: 71.1 kg (156 lb 12 oz)(1/27), Weight source: Not specified BMI: 26.1, Overweight (BMI 25.0-29. 9) Admission Body Weight: 159 lb 2.8 oz(Bedscale) Ideal Body Wt: 125 lbs (57 kg), 123.8 % Usual Body Wt: 73.5 kg (162 lb)(Per EMR 11/5/07265), Percent weight change: -4.5 Estimated Daily Nutrient Needs: 
Energy (kcal/day): 5606-3942 (Kcal/kg(25-30), Weight Used: Ideal(56.8)) Protein (g/day): 68-86 (1.2-1.5 g/kg) Weight Used: (Ideal) Fluid (ml/day):   (Standard renal) Nutrition Diagnosis:  
· Inadequate oral intake related to impaired respiratory function as evidenced by (intubated, NPO, TF to meet needs) Nutrition Interventions:  
Food and/or Nutrient Delivery: Continue NPO, Continue tube feeding Coordination of Nutrition Care: Continue to monitor while inpatient Plan of Care discussed with Vanessa Peña RN. Goals:  
Previous Goal Met: Goal(s) achieved Active Goal: Maintain TF to meet estimated needs Nutrition Monitoring and Evaluation:  
  
Food/Nutrient Intake Outcomes: Enteral nutrition intake/tolerance Physical Signs/Symptoms Outcomes: Biochemical data, GI status Discharge Planning: Too soon to determine Buzz Dryer, [address], LDN on 1/27/2021 at 4:09 PM 
Contact: [phone] Disaster Mode active

## 2021-01-27 NOTE — PROGRESS NOTES
Bedside shift change report given to Ankit RN (oncoming nurse) by Mike Francisco RN (offgoing nurse). Report included the following information SBAR, Kardex, MAR, Recent Results and Cardiac Rhythm NSR/ST. Fentanyl gtt dual verified.

## 2021-01-27 NOTE — PROGRESS NOTES
CM continues to follow for discharge planning and/or CM needs. No CM needs noted or voiced at this time. Will continue to follow and update.

## 2021-01-28 NOTE — PROGRESS NOTES
Taran Reid Admission Date: 12/24/2020 Daily Progress Note: 1/28/2021 The patient's chart is reviewed and the patient is discussed with the staff. Admitted with acute respiratory failure secondary to COVID 19. Rx with Decadron and plasma. She was in renal failure on admission so did not get Remdesivir. She was initially on bipap but suffered with a cardiac arrest on 12/30 so was intubated at that time. She has remained vent dependent since then. Rx for an E coli UTI. Also grew MRSA in her sputum. Now off abx. Has acute on chronic renal failure - started on dialysis on 1/12 but held on 1/25 with increased urine output - nephrology has signed off. Trach placed 1/19. GI holding on PEG due to multiple medical problems - ? Change in category status. Neurology evaluating - abnormal EEG with moderate to severe slowing. MRI 1/26 - results pending. Subjective: Eyes partially open but not able to nod to conversation or follow commands. Respiratory rate up to almost 40 and she looks short of breath. Current Facility-Administered Medications Medication Dose Route Frequency  lactated Ringers infusion  75 mL/hr IntraVENous CONTINUOUS  
 hydrALAZINE (APRESOLINE) 20 mg/mL injection 10 mg  10 mg IntraVENous Q6H PRN  
 insulin glargine (LANTUS) injection 20 Units  20 Units SubCUTAneous QHS  methadone (DOLOPHINE) tablet 10 mg  10 mg Oral BID  
 0.9% sodium chloride infusion 250 mL  250 mL IntraVENous PRN  
 famotidine (PEPCID) tablet 20 mg  20 mg Oral DAILY  risperiDONE (RisperDAL) tablet 0.5 mg  0.5 mg Oral BID  insulin regular (NOVOLIN R, HUMULIN R) injection   SubCUTAneous BID  [Held by provider] guanFACINE IR (TENEX) tablet 1 mg  1 mg Oral BID  docusate (COLACE) 50 mg/5 mL oral liquid 100 mg  100 mg Per NG tube DAILY PRN  
 heparin (porcine) 1,000 unit/mL injection 5,000 Units  5,000 Units IntraVENous DIALYSIS PRN  
  alcohol 62% (NOZIN) nasal  1 Ampule  1 Ampule Topical Q12H  
 0.9% sodium chloride infusion 250 mL  250 mL IntraVENous PRN  
 heparin (porcine) injection 5,000 Units  5,000 Units SubCUTAneous Q8H  
 fentaNYL citrate (PF) injection 50 mcg  50 mcg IntraVENous Q2H PRN  
 bisacodyL (DULCOLAX) suppository 10 mg  10 mg Rectal DAILY PRN  
 NUTRITIONAL SUPPORT ELECTROLYTE PRN ORDERS   Does Not Apply PRN  
 [Held by provider] hydrALAZINE (APRESOLINE) tablet 10 mg  10 mg Per NG tube TID  dextrose 40% (GLUTOSE) oral gel 1 Tube  15 g Oral PRN  
 glucagon (GLUCAGEN) injection 1 mg  1 mg IntraMUSCular PRN  
 dextrose (D50W) injection syrg 12.5-25 g  25-50 mL IntraVENous PRN  
 albuterol (PROVENTIL HFA, VENTOLIN HFA, PROAIR HFA) inhaler 2 Puff  2 Puff Inhalation Q4H PRN  phenol throat spray (CHLORASEPTIC) 1 Spray  1 Spray Oral PRN  
 lip protectant (BLISTEX) ointment 1 Each  1 Each Topical PRN  
 acetaminophen (TYLENOL) tablet 650 mg  650 mg Oral Q6H PRN  
 sodium chloride (NS) flush 5-40 mL  5-40 mL IntraVENous Q8H  
 sodium chloride (NS) flush 5-40 mL  5-40 mL IntraVENous PRN  
 albuterol (PROVENTIL VENTOLIN) nebulizer solution 2.5 mg  2.5 mg Nebulization Q6H RT  
 
 
 
Objective:  
 
Vitals:  
 01/28/21 0716 01/28/21 0730 01/28/21 0735 01/28/21 0745 BP: (!) 151/70 (!) 150/70 (!) 150/70 (!) 147/69 Pulse: (!) 102 (!) 104 99 (!) 102 Resp: (!) 39 (!) 39 28 25 Temp:    98.8 °F (37.1 °C) SpO2: 99% 99% 99% 99% Weight:      
Height:      
 
Intake and Output:  
01/26 1901 - 01/28 0700 In: 1890.2 [I.V.:116.2] Out: 1110 [Urine:1100; Drains:10] No intake/output data recorded. Physical Exam:  
Constitutional:  the patient is well developed and she looks short of breath with resp rate of near 40 HEENT:  Sclera clear, partially opening eyes Lungs: rhonchi bilaterally. On vent - resp rate up today. Cardiovascular:  RRR without M,G,R. Sinus tach per telemetry Abd/GI: soft and no evidence of tenderness; with positive bowel sounds. Ext: warm without cyanosis. There is noted arm edema - none in legs Musculoskeletal: No spontaneous movement at present. Skin:  no jaundice or rashes Neuro: Unresponsive at present. Musculoskeletal: can't ambulate. No deformity Psychiatric: cannot assess Review of Systems - cannot assess due to mental status Lines: left neck central line, HD cath, coyle,  NG, trach CHEST XRAY:  
 
 
LAB Recent Labs  
  01/28/21 
0352 01/27/21 
0309 01/26/21 
0345 WBC 16.9* 15.8* 16.9* HGB 7.6* 7.8* 7.9*  
HCT 24.5* 25.2* 25.3*  
 259 252 Recent Labs  
  01/28/21 
0352 01/27/21 
0309 01/26/21 
0345  139 139  
K 4.3 3.9 4.1  101 103 CO2 36* 36* 34* * 345* 273* BUN 89* 77* 72* CREA 2.75* 2.37* 2.45* MG  --  2.4  --   
PHOS  --  2.7  --   
 
Ventilator Settings Mode FIO2 Rate Tidal Volume Pressure PEEP Pressure control  60 % 15 450 ml  12 cm H2O  10 cm H20 Peak airway pressure: 28 cm H2O Minute ventilation: 9.5 l/min ABG:  
Recent Labs  
  01/28/21 
0403 01/27/21 
0451 01/26/21 
0216 PHI 7.37 7.41 7.38  
PCO2I 59.2* 56.5* 56.4*  
PO2I 61* 71* 81 HCO3I 33.9* 35.6* 33.5* Assessment:  (Medical Decision Making) Hospital Problems  Date Reviewed: 8/10/2019 Codes Class Noted POA Cardiac arrest [facility]) ICD-10-CM: I46.9 ICD-9-CM: 427.5  12/30/2020 No  
 No evidence anoxic injury per neuro COVID-19 ICD-10-CM: U07.1 ICD-9-CM: 079.89  12/24/2020 Yes Now off isolation * (Principal) Acute respiratory distress syndrome (ARDS) due to severe acute respiratory syndrome coronavirus 2 (SARS-CoV-2) ([facility]) ICD-10-CM: U07.1, J80 
ICD-9-CM: 518.82, 079.89  12/24/2020 No  
 Remains on vent - has not tolerated weaning efforts due to tachypnea even when on full support Acute hypoxemic respiratory failure ([facility] [address] ICD-10-CM: J96.01 
ICD-9-CM: 518.81  12/24/2020 Yes As above Acute on chronic renal failure (HCC) ICD-10-CM: N17.9, N18.9 ICD-9-CM: 584.9, 585.9  9/17/2019 Yes Labs look worse today. Nephrology has started on IV fluids for suspected volume depletion Coronary artery disease involving coronary bypass graft of native heart without angina pectoris (Chronic) ICD-10-CM: J57.687 ICD-9-CM: 414.05  6/15/2015 Yes Uncontrolled type 2 diabetes mellitus with hyperglycemia (HCC) (Chronic) ICD-10-CM: E11.65 ICD-9-CM: 250.02  6/15/2015 Yes BS up to 200s Diabetes mellitus with hyperosmolarity without hyperglycemic hyperosmolar nonketotic coma (HCC) (Chronic) ICD-10-CM: E11.00 ICD-9-CM: 250.20  11/24/2014 Yes Plan:  (Medical Decision Making) 1. Currently on PC, rate 15 and FIO2 up to 60%. Her respiratory rate is nearly 40. ABG looks good. Using BID Methadone prn Fentanyl now for sedation. Will change Risperdal from BID to every HS as I doubt it is helping with tachypnea and she is not agitated. CXR looks worse but she looks dry per labs. COVID recovery with ARDS. No weaning efforts for now 2. Urine output 1100 mls yesterday. IV fluids per nephrology. Labs tomorrow. 3. No PEG planned. Family meeting yesterday and updated daughter today. She cannot be sure when family will be able to make decision to change to comfort care but she does not think they will prolong her care if she is not improving. 4. Hgb stable - transfused earlier this week. 5. Neurology has seen - EEG done. Moderate to severe encephalopathy. MRI - 1. Stable age-related senescent changes and chronic microvascular disease with 
remote right posterior temporal occipital ischemic insult. No evidence anoxic brain injury per neuro - deep encephalopathy. 7. Lovenox/Pepcid 9. Increase lantus tomorrow for hyperglycemia 10. Restart Apresoline for hypertension Erick June, NP Lungs:   Crackles bilateral 
Heart:  RRR with no Murmur/Rubs/Gallops Additional Comments:  cxr looks worse-bilateral infiltrates, on vent support since 12/30 I have spoken with and examined the patient. I agree with the above assessment and plan as documented. Alicia Tomlin MD 
 
 
More than 50% of time documented was spent face-to-face contact with the patient and in the care of the patient on the floor/unit where the patient is located.

## 2021-01-28 NOTE — PROGRESS NOTES
[facility] PROGRESS NOTE Follow up for: PRATIMA Subjective:  
Patient seen and examined. Trached on vent. Not interactive. BUN/Cr up with decreased UOP. 
 
 
ROS: 
Unable to obtain Objective:  
Exam: 
Vitals:  
 01/28/21 5336 01/28/21 0730 01/28/21 0735 01/28/21 0745 BP: (!) 151/70 (!) 150/70 (!) 150/70 (!) 147/69 Pulse: (!) 102 (!) 104 99 (!) 102 Resp: (!) 39 (!) 39 28 25 Temp:    98.8 °F (37.1 °C) SpO2: 99% 99% 99% 99% Weight:      
Height:      
 
PE: 
General: trached on Vent. Lung: rhonchi bilaterally CV; Regular rate, S1, S2, no Rub Abd: soft, non tender, + BS Ext: trace edema Access: right temp line-intact Intake/Output Summary (Last 24 hours) at 1/28/2021 0945 Last data filed at 1/28/2021 7268 Gross per 24 hour Intake 1256.42 ml Output 550 ml Net 706.42 ml Current Facility-Administered Medications Medication Dose Route Frequency  lactated Ringers infusion  75 mL/hr IntraVENous CONTINUOUS  
 hydrALAZINE (APRESOLINE) 20 mg/mL injection 10 mg  10 mg IntraVENous Q6H PRN  
 insulin glargine (LANTUS) injection 20 Units  20 Units SubCUTAneous QHS  methadone (DOLOPHINE) tablet 10 mg  10 mg Oral BID  
 0.9% sodium chloride infusion 250 mL  250 mL IntraVENous PRN  
 famotidine (PEPCID) tablet 20 mg  20 mg Oral DAILY  risperiDONE (RisperDAL) tablet 0.5 mg  0.5 mg Oral BID  insulin regular (NOVOLIN R, HUMULIN R) injection   SubCUTAneous BID  [Held by provider] guanFACINE IR (TENEX) tablet 1 mg  1 mg Oral BID  docusate (COLACE) 50 mg/5 mL oral liquid 100 mg  100 mg Per NG tube DAILY PRN  
 heparin (porcine) 1,000 unit/mL injection 5,000 Units  5,000 Units IntraVENous DIALYSIS PRN  
 alcohol 62% (NOZIN) nasal  1 Ampule  1 Ampule Topical Q12H  
 0.9% sodium chloride infusion 250 mL  250 mL IntraVENous PRN  
 heparin (porcine) injection 5,000 Units  5,000 Units SubCUTAneous Q8H  
  fentaNYL citrate (PF) injection 50 mcg  50 mcg IntraVENous Q2H PRN  
 bisacodyL (DULCOLAX) suppository 10 mg  10 mg Rectal DAILY PRN  
 NUTRITIONAL SUPPORT ELECTROLYTE PRN ORDERS   Does Not Apply PRN  
 [Held by provider] hydrALAZINE (APRESOLINE) tablet 10 mg  10 mg Per NG tube TID  dextrose 40% (GLUTOSE) oral gel 1 Tube  15 g Oral PRN  
 glucagon (GLUCAGEN) injection 1 mg  1 mg IntraMUSCular PRN  
 dextrose (D50W) injection syrg 12.5-25 g  25-50 mL IntraVENous PRN  
 albuterol (PROVENTIL HFA, VENTOLIN HFA, PROAIR HFA) inhaler 2 Puff  2 Puff Inhalation Q4H PRN  phenol throat spray (CHLORASEPTIC) 1 Spray  1 Spray Oral PRN  
 lip protectant (BLISTEX) ointment 1 Each  1 Each Topical PRN  
 acetaminophen (TYLENOL) tablet 650 mg  650 mg Oral Q6H PRN  
 sodium chloride (NS) flush 5-40 mL  5-40 mL IntraVENous Q8H  
 sodium chloride (NS) flush 5-40 mL  5-40 mL IntraVENous PRN  
 albuterol (PROVENTIL VENTOLIN) nebulizer solution 2.5 mg  2.5 mg Nebulization Q6H RT  
 
 
Recent Labs  
  01/28/21 
0352 01/27/21 
0309 01/26/21 
0345 WBC 16.9* 15.8* 16.9* HGB 7.6* 7.8* 7.9*  
HCT 24.5* 25.2* 25.3*  
 259 252 Recent Labs  
  01/28/21 
0352 01/27/21 
0309 01/26/21 
0345  139 139  
K 4.3 3.9 4.1  101 103 CO2 36* 36* 34* BUN 89* 77* 72* CREA 2.75* 2.37* 2.45* CA 10.3 9.8 9.7 * 345* 273* MG  --  2.4  --   
PHOS  --  2.7  --   
 
 
Assessment and Plan: 1. PRATIMA on CKD- Last SLED on 1/23. BUN/Cr are up some with decreased UOP. I suspect she is a little volume down. Restart IV fluids. .  Hopefully does not require further HD. Continue to monitor renal function and UOP. 2. Hyperkalemia- resolved. 
  
3. COVID 19 +/acute respiratory failure/ARDs ventilated per pulm 4. Anemia- transfusion threshold per primary.  
 
Mary Beht Cuellar MD

## 2021-01-28 NOTE — PROGRESS NOTES
Bedside shift change report given to Ankit RN (oncoming nurse) by Tori Villela RN (offgoing nurse). Report included the following information SBAR, Kardex, MAR, Recent Results and Cardiac Rhythm NSR/ST.

## 2021-01-28 NOTE — PROGRESS NOTES
Ventilator check complete; patient has a #6.0 tracheostomy. Patient is sedated. Patient is not able to follow commands. Breath sounds are clear and diminished. Trachea is midline, Negative for subcutaneous air, and chest excursion is symmetric. Patient is also Negative for cyanosis. All alarms are set and audible. Resuscitation bag is at the head of the bed. Ventilator Settings Mode FIO2 Rate Tidal Volume Pressure PEEP I:E Ratio Pressure control  45 %  15    15  10 cm H20  1:3.1 Peak airway pressure: 28 cm H2O Minute ventilation: 14.2 l/min RT Amanda

## 2021-01-28 NOTE — PROGRESS NOTES
Ventilator check complete; patient has a #6XLT  tracheostomy. Patient is not sedated. Patient is able to follow commands. Breath sounds are coarse. Trachea is midline, Negative for subcutaneous air, and chest excursion is symmetric. Patient is also Negative for cyanosis and is Negative for pitting edema. All alarms are set and audible. Resuscitation bag is at the head of the bed. Ventilator Settings Mode FIO2 Rate Tidal Volume Pressure PEEP I:E Ratio Pressure control  50 %   15  15 cm h20  10 cm H20  1:1.3 Peak airway pressure: 28 cm H2O Minute ventilation: 10.2 l/min RT Mason

## 2021-01-29 NOTE — PROGRESS NOTES
Ventilator check complete; patient has a # 6   tracheostomy. Patient is sedated. Patient is not able to follow commands. Breath sounds are coarse. Trachea is midline, Negative for subcutaneous air, and chest excursion is symmetric. Patient is also Negative for cyanosis and is Negative for pitting edema. All alarms are set and audible. Resuscitation bag is at the head of the bed. Ventilator Settings Mode FIO2 Rate Tidal Volume Pressure PEEP I:E Ratio Pressure control  45 %   15  15 cm H20  10 cm H20  1:1.1 Peak airway pressure: 27 cm H2O Minute ventilation: 9.9 l/min Cindy Thibodeaux, RT

## 2021-01-29 NOTE — PROGRESS NOTES
CM continues to follow for discharge planning and/or CM needs. Family meeting held with clinical staff on 1/27/21 per chart review to discuss goals of care. Family still pending decision for care goals. No CM needs at this time but will remain available. Will continue to monitor and update as needed.

## 2021-01-29 NOTE — PROGRESS NOTES
Bedside and Verbal shift change report given to Talon Haq RN (oncoming nurse) by Adelaide Andrews RN (offgoing nurse). Report included the following information SBAR, Kardex, Intake/Output, MAR, Recent Results, Cardiac Rhythm NSR and Alarm Parameters . Pt turned and repositioned.

## 2021-01-29 NOTE — PROGRESS NOTES
Ventilator check complete; patient has a #6.0 XLT tracheostomy. Patient is sedated. Patient is not able to follow commands. Breath sounds are coarse and diminished. Trachea is midline, Negative for subcutaneous air, and chest excursion is symmetric. Patient is also Negative for cyanosis. All alarms are set and audible. Resuscitation bag is at the head of the bed. Ventilator Settings Mode FIO2 Rate Tidal Volume Pressure PEEP I:E Ratio Pressure control  45 %  15    15  10 cm H20  1:1.7 Peak airway pressure: 28 cm H2O Minute ventilation: 11 l/min Saundra Ugalde, RT 
 none

## 2021-01-29 NOTE — PROGRESS NOTES
Laura Shipley Admission Date: 12/24/2020 Daily Progress Note: 1/29/2021 The patient's chart is reviewed and the patient is discussed with the staff. Admitted with acute respiratory failure secondary to COVID 19. Rx with Decadron and plasma. She was in renal failure on admission so did not get Remdesivir. She was initially on bipap but suffered with a cardiac arrest on 12/30 so was intubated at that time. She has remained vent dependent since then. Rx for an E coli UTI. Also grew MRSA in her sputum. Now off abx. Has acute on chronic renal failure - started on dialysis on 1/12 but held on 1/25 with increased urine output - nephrology has signed off. Trach placed 1/19. GI holding on PEG due to multiple medical problems - ? Change in category status. Neurology evaluating - abnormal EEG with moderate to severe slowing. MRI 1/26 -   Stable age-related senescent changes and chronic microvascular disease with 
remote right posterior temporal occipital ischemic insult. No evidence anoxic brain injury per neuro - deep encephalopathy. Subjective:  
  Got Fentanyl around 8:30 this morning. Still having problem with tachypnea. Vent adjusted on rounds - now on VC with better TV. Current Facility-Administered Medications Medication Dose Route Frequency  lactated Ringers infusion  75 mL/hr IntraVENous CONTINUOUS  
 risperiDONE (RisperDAL) tablet 0.5 mg  0.5 mg Oral QHS  hydrALAZINE (APRESOLINE) 20 mg/mL injection 10 mg  10 mg IntraVENous Q6H PRN  
 insulin glargine (LANTUS) injection 20 Units  20 Units SubCUTAneous QHS  methadone (DOLOPHINE) tablet 10 mg  10 mg Oral BID  
 0.9% sodium chloride infusion 250 mL  250 mL IntraVENous PRN  
 famotidine (PEPCID) tablet 20 mg  20 mg Oral DAILY  insulin regular (NOVOLIN R, HUMULIN R) injection   SubCUTAneous BID  [Held by provider] guanFACINE IR (TENEX) tablet 1 mg  1 mg Oral BID  
  docusate (COLACE) 50 mg/5 mL oral liquid 100 mg  100 mg Per NG tube DAILY PRN  
 heparin (porcine) 1,000 unit/mL injection 5,000 Units  5,000 Units IntraVENous DIALYSIS PRN  
 alcohol 62% (NOZIN) nasal  1 Ampule  1 Ampule Topical Q12H  
 0.9% sodium chloride infusion 250 mL  250 mL IntraVENous PRN  
 heparin (porcine) injection 5,000 Units  5,000 Units SubCUTAneous Q8H  
 fentaNYL citrate (PF) injection 50 mcg  50 mcg IntraVENous Q2H PRN  
 bisacodyL (DULCOLAX) suppository 10 mg  10 mg Rectal DAILY PRN  
 NUTRITIONAL SUPPORT ELECTROLYTE PRN ORDERS   Does Not Apply PRN  
 hydrALAZINE (APRESOLINE) tablet 10 mg  10 mg Per NG tube TID  dextrose 40% (GLUTOSE) oral gel 1 Tube  15 g Oral PRN  
 glucagon (GLUCAGEN) injection 1 mg  1 mg IntraMUSCular PRN  
 dextrose (D50W) injection syrg 12.5-25 g  25-50 mL IntraVENous PRN  
 albuterol (PROVENTIL HFA, VENTOLIN HFA, PROAIR HFA) inhaler 2 Puff  2 Puff Inhalation Q4H PRN  phenol throat spray (CHLORASEPTIC) 1 Spray  1 Spray Oral PRN  
 lip protectant (BLISTEX) ointment 1 Each  1 Each Topical PRN  
 acetaminophen (TYLENOL) tablet 650 mg  650 mg Oral Q6H PRN  
 sodium chloride (NS) flush 5-40 mL  5-40 mL IntraVENous Q8H  
 sodium chloride (NS) flush 5-40 mL  5-40 mL IntraVENous PRN  
 albuterol (PROVENTIL VENTOLIN) nebulizer solution 2.5 mg  2.5 mg Nebulization Q6H RT  
 
 
 
Objective:  
 
Vitals:  
 01/29/21 0600 01/29/21 0615 01/29/21 0803 01/29/21 6145 BP: (!) 140/69 133/63  (!) 147/74 Pulse: 91 84 (!) 103 99 Resp: 17 22 (!) 38 Temp:      
SpO2: 96% 98% 92% Weight:      
Height:      
 
Intake and Output:  
01/27 1901 - 01/29 0700 In: 3602.5 [I.V.:1482.5] Out: 1200 [Urine:1200] No intake/output data recorded. Physical Exam:  
Constitutional:  the patient is well developed and she looks short of breath with resp rate of near 40 HEENT:  Sclera clear, partially opening eyes Lungs: rhonchi bilaterally. On vent - resp rate up at present. Cardiovascular:  RRR without M,G,R. Sinus tach per telemetry Abd/GI: soft and no evidence of tenderness; with positive bowel sounds. Ext: warm without cyanosis. There is noted arm edema - none in legs Musculoskeletal: No spontaneous movement at present. Skin:  no jaundice or rashes Neuro: Unresponsive at present. Musculoskeletal: can't ambulate. No deformity Psychiatric: cannot assess Review of Systems - cannot assess due to mental status Lines: left neck central line, HD cath, coyle,  NG, trach CHEST XRAY:  
 
 
LAB Recent Labs  
  01/29/21 
0302 01/28/21 
0352 01/27/21 
0309 WBC 16.7* 16.9* 15.8* HGB 7.0* 7.6* 7.8* HCT 23.4* 24.5* 25.2*  
 247 259 Recent Labs  
  01/29/21 
0302 01/28/21 
0352 01/27/21 
0309  142 139  
K 4.5 4.3 3.9  103 101 CO2 35* 36* 36* * 248* 345* BUN 92* 89* 77* CREA 2.53* 2.75* 2.37* MG 2.5*  --  2.4 PHOS 3.4  --  2.7 Ventilator Settings Mode FIO2 Rate Tidal Volume Pressure PEEP Pressure control  45 % 15 450 ml  12 cm H2O  10 cm H20 Peak airway pressure: 28 cm H2O Minute ventilation: 11 l/min ABG:  
Recent Labs  
  01/29/21 
0417 01/28/21 
0403 01/27/21 
0451 PHI 7.36 7.37 7.41  
PCO2I 62.1* 59.2* 56.5*  
PO2I 91 61* 71* HCO3I 34.9* 33.9* 35.6* Assessment:  (Medical Decision Making) Hospital Problems  Date Reviewed: 8/10/2019 Codes Class Noted POA Cardiac arrest [facility]) ICD-10-CM: I46.9 ICD-9-CM: 427.5  12/30/2020 No  
 No evidence anoxic injury per neuro COVID-19 ICD-10-CM: U07.1 ICD-9-CM: 079.89  12/24/2020 Yes Now off isolation  * (Principal) Acute respiratory distress syndrome (ARDS) due to severe acute respiratory syndrome coronavirus 2 (SARS-CoV-2) ([facility]) ICD-10-CM: U07.1, J80 
ICD-9-CM: 518.82, 079.89  12/24/2020 No  
 Remains on vent - has not tolerated weaning efforts due to tachypnea even when on full support Acute hypoxemic respiratory failure ([facility] [facility] ICD-10-CM: J96.01 
ICD-9-CM: 518.81  12/24/2020 Yes As above Acute on chronic renal failure (HCC) ICD-10-CM: N17.9, N18.9 ICD-9-CM: 584.9, 585.9  9/17/2019 Yes Labs look better today after hydration per nephrology. No dialysis this week Coronary artery disease involving coronary bypass graft of native heart without angina pectoris (Chronic) ICD-10-CM: I05.127 ICD-9-CM: 414.05  6/15/2015 Yes Uncontrolled type 2 diabetes mellitus with hyperglycemia (HCC) (Chronic) ICD-10-CM: E11.65 ICD-9-CM: 250.02  6/15/2015 Yes BS up to 200s Diabetes mellitus with hyperosmolarity without hyperglycemic hyperosmolar nonketotic coma (HCC) (Chronic) ICD-10-CM: E11.00 ICD-9-CM: 250.20  11/24/2014 Yes Plan:  (Medical Decision Making) 1. Currently on PC, rate 15 and FIO2 down to 45%.  to 300 mls range and she looks short of breath. Had RT change her to [facility] and she looks al little more comfortable. Using prn fentanyl otherwise. Now on BID Methadone with prn Fentanyl now for sedation. Have changed Risperdal to every HS as doubt it is helping much with tachypnea and she is not agitated. CXR looks worse since she went into renal failure in addition to COVID recovery with ARDS. Not weanable at present. 2. Urine output +2100 mls yesterday. IV fluids per nephrology. Labs look better today. 3. No PEG planned. Family meeting this week and have updated daughter daily. She cannot be sure when family will be able to make decision to change to comfort care. Patient reportedly had a living will but daughter tells me that patient's other daughter is struggling with letting her go so won't consent to change in category status for now - currently DNR. 4. Hgb down to 7.0 today but she is hemodynamically stable so will not transfuse. Continue to monitor 5. Neurology has seen - EEG done. Moderate to severe encephalopathy. MRI - 1. Stable age-related senescent changes and chronic microvascular disease with remote right posterior temporal occipital ischemic insult. No evidence anoxic brain injury per neuro - deep encephalopathy. 7. Lovenox/Pepcid 9. Increase lantus today for hyperglycemia 10. Blood pressure better with Apresoline Nickolas Carrel, CHER Lungs: rhonchi, tachypnea Heart:  RRR with no Murmur/Rubs/Gallops Additional Comments:  O2 sats ok on 45% but the pt. Is tachypneic with increased wob-looks bad,  Family aware and considering comfort care Will place on fentanyl drip I have spoken with and examined the patient. I agree with the above assessment and plan as documented. Torrey Casarez MD 
 
 
More than 50% of time documented was spent face-to-face contact with the patient and in the care of the patient on the floor/unit where the patient is located.

## 2021-01-29 NOTE — PROGRESS NOTES
[facility] PROGRESS NOTE Follow up for: PRATIMA Subjective:  
Patient seen and examined. Trached on vent. Not interactive. Cr better after IV fluids. Improved UOP. 
 
ROS: 
Unable to obtain Objective:  
Exam: 
Vitals:  
 01/29/21 0901 01/29/21 0915 01/29/21 0930 01/29/21 0945 BP: (!) 145/73 (!) 142/67 (!) 151/75 (!) 151/70 Pulse: 94 94 96 93 Resp: 25 (!) 37 (!) 38 23 Temp:      
SpO2: 93% 93% 93% 95% Weight:      
Height:      
 
PE: 
General: trached on Vent. Lung: rhonchi bilaterally CV; Regular rate, S1, S2, no Rub Abd: soft, non tender, + BS Ext: trace edema Access: right temp line-intact Intake/Output Summary (Last 24 hours) at 1/29/2021 1001 Last data filed at 1/29/2021 6973 Gross per 24 hour Intake 2937.5 ml Output 900 ml Net 2037.5 ml  
 
 
Current Facility-Administered Medications Medication Dose Route Frequency  insulin glargine (LANTUS) injection 30 Units  30 Units SubCUTAneous QHS  risperiDONE (RisperDAL) tablet 0.5 mg  0.5 mg Oral QHS  hydrALAZINE (APRESOLINE) 20 mg/mL injection 10 mg  10 mg IntraVENous Q6H PRN  
 methadone (DOLOPHINE) tablet 10 mg  10 mg Oral BID  
 0.9% sodium chloride infusion 250 mL  250 mL IntraVENous PRN  
 famotidine (PEPCID) tablet 20 mg  20 mg Oral DAILY  insulin regular (NOVOLIN R, HUMULIN R) injection   SubCUTAneous BID  [Held by provider] guanFACINE IR (TENEX) tablet 1 mg  1 mg Oral BID  docusate (COLACE) 50 mg/5 mL oral liquid 100 mg  100 mg Per NG tube DAILY PRN  
 heparin (porcine) 1,000 unit/mL injection 5,000 Units  5,000 Units IntraVENous DIALYSIS PRN  
 alcohol 62% (NOZIN) nasal  1 Ampule  1 Ampule Topical Q12H  
 0.9% sodium chloride infusion 250 mL  250 mL IntraVENous PRN  
 heparin (porcine) injection 5,000 Units  5,000 Units SubCUTAneous Q8H  
 fentaNYL citrate (PF) injection 50 mcg  50 mcg IntraVENous Q2H PRN  
 bisacodyL (DULCOLAX) suppository 10 mg  10 mg Rectal DAILY PRN  
  NUTRITIONAL SUPPORT ELECTROLYTE PRN ORDERS   Does Not Apply PRN  
 hydrALAZINE (APRESOLINE) tablet 10 mg  10 mg Per NG tube TID  dextrose 40% (GLUTOSE) oral gel 1 Tube  15 g Oral PRN  
 glucagon (GLUCAGEN) injection 1 mg  1 mg IntraMUSCular PRN  
 dextrose (D50W) injection syrg 12.5-25 g  25-50 mL IntraVENous PRN  
 albuterol (PROVENTIL HFA, VENTOLIN HFA, PROAIR HFA) inhaler 2 Puff  2 Puff Inhalation Q4H PRN  phenol throat spray (CHLORASEPTIC) 1 Spray  1 Spray Oral PRN  
 lip protectant (BLISTEX) ointment 1 Each  1 Each Topical PRN  
 acetaminophen (TYLENOL) tablet 650 mg  650 mg Oral Q6H PRN  
 sodium chloride (NS) flush 5-40 mL  5-40 mL IntraVENous Q8H  
 sodium chloride (NS) flush 5-40 mL  5-40 mL IntraVENous PRN  
 albuterol (PROVENTIL VENTOLIN) nebulizer solution 2.5 mg  2.5 mg Nebulization Q6H RT  
 
 
Recent Labs  
  01/29/21 
0302 01/28/21 
0352 01/27/21 
0309 WBC 16.7* 16.9* 15.8* HGB 7.0* 7.6* 7.8* HCT 23.4* 24.5* 25.2*  
 247 259 Recent Labs  
  01/29/21 
0302 01/28/21 
0352 01/27/21 
0309  142 139  
K 4.5 4.3 3.9  103 101 CO2 35* 36* 36* BUN 92* 89* 77* CREA 2.53* 2.75* 2.37* CA 10.2 10.3 9.8 * 248* 345* MG 2.5*  --  2.4 PHOS 3.4  --  2.7 Assessment and Plan: 1. PRATIMA on CKD- Last SLED on 1/23. Cr is better with good UOP. .  Stop IV fluids today 2/2 worsening respiratory status. WIll have her dialysis catheter removed today. 2. Hyperkalemia- resolved. 
  
3. COVID 19 +/acute respiratory failure/ARDs ventilated per pulm 4. Anemia- transfusion threshold per primary. We will sign off. Please call with any questions.  
 
Noel Lamas MD

## 2021-01-30 NOTE — PROGRESS NOTES
MD notified regarding HGB, no orders to transfuse to at this time. To call Md if change in patient condition.

## 2021-01-30 NOTE — PROGRESS NOTES
Ventilator check complete; patient has a #6 XLT tracheostomy. Patient is not sedated. Patient is able to follow commands. Breath sounds are coarse. Trachea is midline, Negative for subcutaneous air, and chest excursion is symmetric. Patient is also Negative for cyanosis and is Negative for pitting edema. All alarms are set and audible. Resuscitation bag is at the head of the bed. Ventilator Settings Mode FIO2 Rate Tidal Volume Pressure PEEP I:E Ratio VC+  45 %   15 bpm 380 ml    10 cm H20  1:2.4 Peak airway pressure: 30 cm H2O Minute ventilation: 8.98 l/min ABG: 
 
Jessica Figueroa, RT

## 2021-01-30 NOTE — PROGRESS NOTES
Roxy Damon Admission Date: 12/24/2020 Daily Progress Note: 1/30/2021 The patient's chart is reviewed and the patient is discussed with the staff. Admitted with acute respiratory failure secondary to COVID 19. Rx with Decadron and plasma. She was in renal failure on admission so did not get Remdesivir. She was initially on bipap but suffered with a cardiac arrest on 12/30 so was intubated at that time. She has remained vent dependent since then. Rx for an E coli UTI. Also grew MRSA in her sputum. Now off abx. Has acute on chronic renal failure - started on dialysis on 1/12 but held on 1/25 with increased urine output - nephrology has signed off. Trach placed 1/19. GI holding on PEG due to multiple medical problems - ? Change in category status. Neurology evaluating - abnormal EEG with moderate to severe slowing. MRI 1/26 -   Stable age-related senescent changes and chronic microvascular disease with 
remote right posterior temporal occipital ischemic insult. No evidence anoxic brain injury per neuro - deep encephalopathy. Subjective:  
Remains on fentanyl drip for increased RR. Hgb dropped to 6.9 last PM, no active signs of bleeding. Did not transfuse. Moves spontaneously, does not follow commands. Current Facility-Administered Medications Medication Dose Route Frequency  insulin glargine (LANTUS) injection 30 Units  30 Units SubCUTAneous QHS  fentaNYL in normal saline (pf) 25 mcg/mL infusion  0-200 mcg/hr IntraVENous TITRATE  risperiDONE (RisperDAL) 1 mg/mL oral solution soln 0.5 mg  0.5 mg Nasogastric QHS  hydrALAZINE (APRESOLINE) 20 mg/mL injection 10 mg  10 mg IntraVENous Q6H PRN  
 methadone (DOLOPHINE) tablet 10 mg  10 mg Oral BID  
 0.9% sodium chloride infusion 250 mL  250 mL IntraVENous PRN  
 famotidine (PEPCID) tablet 20 mg  20 mg Oral DAILY  insulin regular (NOVOLIN R, HUMULIN R) injection   SubCUTAneous BID  
  [Held by provider] guanFACINE IR (TENEX) tablet 1 mg  1 mg Oral BID  docusate (COLACE) 50 mg/5 mL oral liquid 100 mg  100 mg Per NG tube DAILY PRN  
 heparin (porcine) 1,000 unit/mL injection 5,000 Units  5,000 Units IntraVENous DIALYSIS PRN  
 alcohol 62% (NOZIN) nasal  1 Ampule  1 Ampule Topical Q12H  
 0.9% sodium chloride infusion 250 mL  250 mL IntraVENous PRN  
 heparin (porcine) injection 5,000 Units  5,000 Units SubCUTAneous Q8H  
 fentaNYL citrate (PF) injection 50 mcg  50 mcg IntraVENous Q2H PRN  
 bisacodyL (DULCOLAX) suppository 10 mg  10 mg Rectal DAILY PRN  
 NUTRITIONAL SUPPORT ELECTROLYTE PRN ORDERS   Does Not Apply PRN  
 hydrALAZINE (APRESOLINE) tablet 10 mg  10 mg Per NG tube TID  dextrose 40% (GLUTOSE) oral gel 1 Tube  15 g Oral PRN  
 glucagon (GLUCAGEN) injection 1 mg  1 mg IntraMUSCular PRN  
 dextrose (D50W) injection syrg 12.5-25 g  25-50 mL IntraVENous PRN  
 albuterol (PROVENTIL HFA, VENTOLIN HFA, PROAIR HFA) inhaler 2 Puff  2 Puff Inhalation Q4H PRN  phenol throat spray (CHLORASEPTIC) 1 Spray  1 Spray Oral PRN  
 lip protectant (BLISTEX) ointment 1 Each  1 Each Topical PRN  
 acetaminophen (TYLENOL) tablet 650 mg  650 mg Oral Q6H PRN  
 sodium chloride (NS) flush 5-40 mL  5-40 mL IntraVENous Q8H  
 sodium chloride (NS) flush 5-40 mL  5-40 mL IntraVENous PRN  
 albuterol (PROVENTIL VENTOLIN) nebulizer solution 2.5 mg  2.5 mg Nebulization Q6H RT  
 
 
 
Objective:  
 
Vitals:  
 01/30/21 0930 01/30/21 0945 01/30/21 1000 01/30/21 1015 BP: (!) 95/55 (!) 95/50 (!) 95/51 (!) 98/52 Pulse: 84 86 84 83 Resp: 18 22 20 20 Temp:      
SpO2: 91% 92% 93% 91% Weight:      
Height:      
 
Intake and Output:  
01/28 1901 - 01/30 0700 In: 3062.5 [I.V.:984.5] Out: 1195 [[id number]] 01/30 0701 - 01/30 1900 In: -  
Out: 40 [Urine:40] Physical Exam:  
Constitutional:  Vented and sedated on fentanyl, calm HEENT:  Sclera clear, pupils reactive Lungs: rhonchi bilaterally. On vent - resp rate up at present. Cardiovascular:  RRR without M,G,R. Sinus tach per telemetry Abd/GI: soft and no evidence of tenderness; with positive bowel sounds. Ext: warm without cyanosis. There is noted arm edema - none in legs Musculoskeletal: No spontaneous movement at present. Skin:  no jaundice or rashes Neuro: Unresponsive at present. Musculoskeletal: can't ambulate. No deformity Psychiatric: cannot assess Review of Systems - cannot assess due to mental status Lines: left neck central line, jonna,  NAZIA, trach CHEST XRAY: 
 01/30/2021 01/29/2021 LAB Recent Labs  
  01/30/21 
0318 01/29/21 
0302 01/28/21 
0352 WBC 16.7* 16.7* 16.9* HGB 6.9* 7.0* 7.6* HCT 22.2* 23.4* 24.5*  
 252 247 Recent Labs  
  01/30/21 
0318 01/29/21 
0302 01/28/21 
0352  141 142  
K 4.5 4.5 4.3  104 103 CO2 36* 35* 36* * 268* 248* * 92* 89* CREA 2.29* 2.53* 2.75* MG  --  2.5*  --   
PHOS  --  3.4  --   
 
Ventilator Settings Mode FIO2 Rate Tidal Volume Pressure PEEP  
VC+  45 % 15 380 ml  12 cm H2O  10 cm H20 Peak airway pressure: 24 cm H2O Minute ventilation: 11.7 l/min ABG:  
Recent Labs  
  01/30/21 
0305 01/29/21 
0417 01/28/21 
0403 PHI 7.33* 7.36 7.37  
PCO2I 67.4* 62.1* 59.2*  
PO2I 70* 91 61* HCO3I 35.5* 34.9* 33.9* Assessment:  (Medical Decision Making) Hospital Problems  Date Reviewed: 8/10/2019 Codes Class Noted POA Cardiac arrest [facility]) ICD-10-CM: I46.9 ICD-9-CM: 427.5  12/30/2020 No  
   
 COVID-19 ICD-10-CM: U07.1 ICD-9-CM: 079.89  12/24/2020 Yes * (Principal) Acute respiratory distress syndrome (ARDS) due to severe acute respiratory syndrome coronavirus 2 (SARS-CoV-2) ([facility]) ICD-10-CM: U07.1, J80 
ICD-9-CM: 518.82, 079.89  12/24/2020 No  
   
 [facility] ICD-10-CM: J96.01 
ICD-9-CM: 518.81  12/24/2020 Yes Acute on chronic renal failure (HCC) ICD-10-CM: N17.9, N18.9 ICD-9-CM: 584.9, 585.9  9/17/2019 Yes Coronary artery disease involving coronary bypass graft of native heart without angina pectoris (Chronic) ICD-10-CM: K05.358 ICD-9-CM: 414.05  6/15/2015 Yes Uncontrolled type 2 diabetes mellitus with hyperglycemia (HCC) (Chronic) ICD-10-CM: E11.65 ICD-9-CM: 250.02  6/15/2015 Yes Diabetes mellitus with hyperosmolarity without hyperglycemic hyperosmolar nonketotic coma (HCC) (Chronic) ICD-10-CM: E11.00 ICD-9-CM: 250.20  11/24/2014 Yes Plan:  (Medical Decision Making) --remains on vent support  
--nephro now signed off 
--family to meet and discuss goals this weekend. Palliative is not following, but will consult for Monday. Per chart, pt has a living will, but daughter states that other daughter is struggling letting her go, so won't consent to change in category status to comfort care for now--she is a DNR, but ongoing family discussing to proceed to comfort care. --no PEG planned 
-- Hgb down to 6.9 today but she is hemodynamically stable so will plan to recheck this AM.  If has dropped lower will plan to transfuse 1 unit. Continue to monitor --Neurology has seen - EEG done. Moderate to severe encephalopathy. MRI - 1. Stable age-related senescent changes and chronic microvascular disease with remote right posterior temporal occipital ischemic insult. No evidence anoxic brain injury per neuro - deep encephalopathy. --Lovenox/Pepcid 
--remains hyperglycemic, even with increase in lantus yesterday, monitor. meds may need to be adjusted if cont to remain hyperglycemic 
--overall long-term prognosis is poor, family is aware, with ongoing discussions being had. Hopefully decision can be made soon. Sandra Capellan NP I have spoken with and examined the patient. I agree with the above assessment and plan as documented. Gen: opens eyes but no command following Lungs:  CTA Heart:  RRR with no Murmur/Rubs/Gallops Abd: NTND Ext: arms swollen --Agree with plans for family meeting 
-- monitor UOP, renal function 
--glucose control between 140-180 
--ppx Ulises Winston MD 
 
Updated dtr Kevin Howe More than 50% of time documented was spent face-to-face contact with the patient and in the care of the patient on the floor/unit where the patient is located.

## 2021-01-30 NOTE — PROGRESS NOTES
Bedside and Verbal shift change reportgiven to FAY ware (oncoming nurse) by Alli Blas RN (offgoing nurse). Report included the following information SBAR, Kardex, Intake/Output and Recent Results. fent gtt rate verified at bedside pt turned

## 2021-01-31 NOTE — PROGRESS NOTES
Ventilator check complete; patient has a #6 Shiley XLT tracheostomy. Patient is not sedated. Patient is able to follow commands. Breath sounds are coarse. Trachea is midline, Negative for subcutaneous air, and chest excursion is symmetric. Patient is also Negative for cyanosis and is Negative for pitting edema. All alarms are set and audible. Resuscitation bag is at the head of the bed. Ventilator Settings Mode FIO2 Rate Tidal Volume Pressure PEEP I:E Ratio VC+  45 %  18 bpm  380 ml    10 cm H20  1:3.16 Peak airway pressure: 34 cm H2O Minute ventilation: 13.3 l/min ABG:  
 
Filemon Rosas, RT

## 2021-01-31 NOTE — PROGRESS NOTES
Bedside and Verbal shift change report given to Seven Matos RN (oncoming nurse) by Sheryle Dus, RN (offgoing nurse). Report included the following information SBAR, Kardex, Intake/Output and Recent Results.

## 2021-01-31 NOTE — PROGRESS NOTES
Ventilator check complete; patient has a #6 tracheostomy. Patient is sedated. Patient is not able to follow commands. Breath sounds are coarse. Trachea is midline, Negative for subcutaneous air, and chest excursion is symmetric. Patient is also Negative for cyanosis and is Negative for pitting edema. All alarms are set and audible. Resuscitation bag is at the head of the bed. Ventilator Settings Mode FIO2 Rate Tidal Volume Pressure PEEP I:E Ratio VC+  50 %   20 380 ml    10 cm H20  1:2.8 Peak airway pressure: 32 cm H2O Minute ventilation: 11.2 l/min ABG: No results for input(s): PH, PCO2, PO2, HCO3 in the last 72 hours.  
 
 
Shakir Elena, RT

## 2021-01-31 NOTE — PROGRESS NOTES
[facility]: 12[facility]/2020 Daily Progress Note: 1/31/2021 The patient's chart is reviewed and the patient is discussed with the staff. Admitted with acute respiratory failure secondary to COVID 19. Rx with Decadron and plasma. She was in renal failure on admission so did not get Remdesivir. She was initially on bipap but suffered with a cardiac arrest on 12/30 so was intubated at that time. She has remained vent dependent since then. Rx for an E coli UTI. Also grew MRSA in her sputum. Now off abx. Has acute on chronic renal failure - started on dialysis on 1/12 but held on 1/25 with increased urine output - nephrology has signed off. Trach placed 1/19. GI holding on PEG due to multiple medical problems - ? Change in category status. Neurology evaluating - abnormal EEG with moderate to severe slowing. MRI 1/26 -   Stable age-related senescent changes and chronic microvascular disease with 
remote right posterior temporal occipital ischemic insult. No evidence anoxic brain injury per neuro - deep encephalopathy. Subjective: No major events overnight. Cousin at bedside. Current Facility-Administered Medications Medication Dose Route Frequency  0.9% sodium chloride infusion 250 mL  250 mL IntraVENous PRN  
 fentaNYL citrate (PF) injection 50 mcg  50 mcg IntraVENous Q1H PRN  
 insulin glargine (LANTUS) injection 30 Units  30 Units SubCUTAneous QHS  hydrALAZINE (APRESOLINE) 20 mg/mL injection 10 mg  10 mg IntraVENous Q6H PRN  
 0.9% sodium chloride infusion 250 mL  250 mL IntraVENous PRN  
 famotidine (PEPCID) tablet 20 mg  20 mg Oral DAILY  insulin regular (NOVOLIN R, HUMULIN R) injection   SubCUTAneous BID  [Held by provider] guanFACINE IR (TENEX) tablet 1 mg  1 mg Oral BID  docusate (COLACE) 50 mg/5 mL oral liquid 100 mg  100 mg Per NG tube DAILY PRN  
  heparin (porcine) 1,000 unit/mL injection 5,000 Units  5,000 Units IntraVENous DIALYSIS PRN  
 alcohol 62% (NOZIN) nasal  1 Ampule  1 Ampule Topical Q12H  
 0.9% sodium chloride infusion 250 mL  250 mL IntraVENous PRN  
 heparin (porcine) injection 5,000 Units  5,000 Units SubCUTAneous Q8H  
 bisacodyL (DULCOLAX) suppository 10 mg  10 mg Rectal DAILY PRN  
 NUTRITIONAL SUPPORT ELECTROLYTE PRN ORDERS   Does Not Apply PRN  
 hydrALAZINE (APRESOLINE) tablet 10 mg  10 mg Per NG tube TID  dextrose 40% (GLUTOSE) oral gel 1 Tube  15 g Oral PRN  
 glucagon (GLUCAGEN) injection 1 mg  1 mg IntraMUSCular PRN  
 dextrose (D50W) injection syrg 12.5-25 g  25-50 mL IntraVENous PRN  
 albuterol (PROVENTIL HFA, VENTOLIN HFA, PROAIR HFA) inhaler 2 Puff  2 Puff Inhalation Q4H PRN  phenol throat spray (CHLORASEPTIC) 1 Spray  1 Spray Oral PRN  
 lip protectant (BLISTEX) ointment 1 Each  1 Each Topical PRN  
 acetaminophen (TYLENOL) tablet 650 mg  650 mg Oral Q6H PRN  
 sodium chloride (NS) flush 5-40 mL  5-40 mL IntraVENous Q8H  
 sodium chloride (NS) flush 5-40 mL  5-40 mL IntraVENous PRN  
 albuterol (PROVENTIL VENTOLIN) nebulizer solution 2.5 mg  2.5 mg Nebulization Q6H RT  
 
 
 
Objective:  
 
Vitals:  
 01/31/21 9551 01/31/21 0745 01/31/21 0803 01/31/21 2699 BP: (!) 154/69 (!) 149/75  (!) 145/69 Pulse: 96 100 97 98 Resp: (!) 37  (!) 35 19 Temp:      
SpO2: 96% 93% 96% 96% Weight:      
Height:      
 
Intake and Output:  
01/29 1901 - 01/31 0700 In: 2294.2 [I.V.:52.5] Out: 1060 [LZKNA:7105] No intake/output data recorded. Physical Exam:  
Constitutional:  Vented and sedated on fentanyl, calm HEENT:  Sclera clear, pupils reactive Lungs: rhonchi bilaterally. On vent - resp rate up at present. Cardiovascular:  RRR without M,G,R. Sinus tach per telemetry Abd/GI: soft and no evidence of tenderness; with positive bowel sounds. Ext: warm without cyanosis. There is noted arm edema - none in legs Musculoskeletal: No spontaneous movement at present. Skin:  no jaundice or rashes Neuro: Unresponsive at present. Musculoskeletal: can't ambulate. No deformity Psychiatric: cannot assess Review of Systems - cannot assess due to mental status Lines: left neck central line, coyle,  NG, trach CHEST XRAY: 
 01/30/2021 01/29/2021 LAB Recent Labs  
  01/31/21 
0330 01/30/21 
1049 01/30/21 
0318 01/29/21 
0302 WBC 19.5*  --  16.7* 16.7* HGB 7.7* 6.7* 6.9* 7.0*  
HCT 25.5* 22.6* 22.2* 23.4*  
  --  219 252 Recent Labs  
  01/31/21 
0330 01/30/21 
0318 01/29/21 
0302  142 141  
K 4.7 4.5 4.5  
 105 104 CO2 35* 36* 35* * 230* 268* * 106* 92* CREA 2.17* 2.29* 2.53* MG  --   --  2.5* PHOS  --   --  3.4 Ventilator Settings Mode FIO2 Rate Tidal Volume Pressure PEEP  
VC+  50 % 15 380 ml  12 cm H2O  10 cm H20 Peak airway pressure: 32 cm H2O Minute ventilation: 11.2 l/min ABG:  
Recent Labs  
  01/31/21 
0150 01/30/21 
0305 01/29/21 
0417 PHI 7.30* 7.33* 7.36  
PCO2I 76.1* 67.4* 62.1*  
PO2I 64* 70* 91 HCO3I 37.2* 35.5* 34.9* Assessment:  (Medical Decision Making) Hospital Problems  Date Reviewed: 8/10/2019 Codes Class Noted POA Cardiac arrest [facility]) ICD-10-CM: I46.9 ICD-9-CM: 427.5  12/30/2020 No  
   
 COVID-19 ICD-10-CM: U07.1 ICD-9-CM: 079.89  12/24/2020 Yes * (Principal) Acute respiratory distress syndrome (ARDS) due to severe acute respiratory syndrome coronavirus 2 (SARS-CoV-2) ([facility]) ICD-10-CM: U07.1, J80 
ICD-9-CM: 518.82, 079.89  12/24/2020 No  
   
 Acute hypoxemic respiratory failure ([facility] [address] ICD-10-CM: J96.01 
ICD-9-CM: 518.81  12/24/2020 Yes Acute on chronic renal failure (HCC) ICD-10-CM: N17.9, N18.9 ICD-9-CM: 584.9, 585.9  9/17/2019 Yes Coronary artery disease involving coronary bypass graft of native heart without angina pectoris (Chronic) ICD-10-CM: S68.020 ICD-9-CM: 414.05  6/15/2015 Yes Uncontrolled type 2 diabetes mellitus with hyperglycemia (HCC) (Chronic) ICD-10-CM: E11.65 ICD-9-CM: 250.02  6/15/2015 Yes Diabetes mellitus with hyperosmolarity without hyperglycemic hyperosmolar nonketotic coma (HCC) (Chronic) ICD-10-CM: E11.00 ICD-9-CM: 250.20  11/24/2014 Yes Plan:  (Medical Decision Making) --remains on vent support  
--BUN up to 112/Cr 2.17 
--Goals of care discussions with PC support appropriate. --no PEG planned --Hgb back up to 7.7 
--Lovenox/Pepcid 
--remains hyperglycemic but improving with lantus increase yesterday 
--overall long-term prognosis is poor, family is aware, with ongoing discussions being had. Hopefully decision can be made soon. Zachary Little NP I have spoken with and examined the patient. I agree with the above assessment and plan as documented. Gen: eyes closed Lungs:  CTAB Heart:  RRR with no Murmur/Rubs/Gallops Abd: NTND Ext: no edema Anticipate compassionate extubation this week. Family visiting now Keeping off continuous drips to allow for any improvement in mentation Guillermina Funk MD

## 2021-01-31 NOTE — PROGRESS NOTES
Bedside shift change report given to [address] [place] [address] (oncoming nurse) by Tanya Van RN (offgoing nurse). Report included the following information SBAR, Kardex, ED Summary, Procedure Summary, Intake/Output, MAR, Recent Results and Cardiac Rhythm SR/ST. Patient turned and repositioned

## 2021-01-31 NOTE — PROGRESS NOTES
Pt increasingly agitated throughout night, Hr 100's RR 30-40 Bp [id number] systolic. PRN hydralazine and fent given pt continuing agitation. Md notified order for 1mg IV ativan received

## 2021-02-01 NOTE — PROGRESS NOTES
Palliative Care Progress Note Patient: Clayton Vargas MRN: [id number]  SSN: [id number] YOB: 1940  Age: 78 y.o. Sex: female Assessment/Plan: Chief Complaint/Interval History:  Remains unresponsive, ventilated via trach Principal Diagnosis:   
· Encephalopathy, Unspecified  G93.40 Additional Diagnoses: · Acute Respiratory Failure, Unspecified  J96.00 · Debility, Unspecified  R53.81 
· Failure to Thrive  R62.7 · Fatigue, Lethargy  R53.83 
· Frailty  R54 · Counseling, Encounter for Medical Advice  Z71.9 
· Encounter for Palliative Care  Z51.5 Palliative Performance Scale (PPS) Medical Decision Making:  
Reviewed and summarized notes over last week Discussed case with appropriate providers- Dr Lloyd Medina RN Reviewed laboratory and x-ray data over last week Pt remains unresponsive, ventilated via trach. Daughter, Major Vasquez, at bedside. We reviewed pt's condition, and lack of improvement. [facility] asked what comfort measures involved. Counseled on the goal of comfort measures, medications used, and ventilator weaning. Counseled that it is difficult to predict how long pt will live after the ventilator is discontinued. [facility] states pt's granddaughter is coming from [facility] tomorrow to visit. She also has told her sister, Marquis Blanco, to come visit today or tomorrow, and say goodbye. [facility] anticipates transitioning pt to comfort measures on Thursday or Friday. Discussed with Jesse Beltran RN. Will continue to follow. Will discuss findings with members of the interdisciplinary team.   
 
  
More than 50% of this 35 minute visit was spent counseling and coordination of care as outlined above. Subjective:  
 
Review of Systems: 
Review of systems not obtained due to patient factors- AMS Objective:  
 
Visit Vitals /60 Pulse 66 Temp 97.3 °F (36.3 °C) Resp 28 Ht 5' 5\" (1.651 m) Wt 166 lb (75.3 kg) SpO2 96% BMI 27.62 kg/m² Physical Exam: 
 
General:  Poorly responsive. Debilitated and frail Eyes:  Conjunctivae/corneas clear Nose: Nares normal. Septum midline. Neck: Supple, symmetrical, trachea midline. Trach with vent Lungs:   Coarse bilaterally, unlabored Heart:  Regular rate and rhythm Abdomen:   Soft, non-tender, non-distended Extremities: Normal, atraumatic, no cyanosis or edema Skin: Skin color, texture, turgor normal  
Neurologic: Does not follow commands Psych: Alert and oriented Signed By: Rae Maciasole, NP February 1, 2021

## 2021-02-01 NOTE — PROGRESS NOTES
Ventilator check complete; patient has a #6 tracheostomy. Patient is sedated. Patient is not able to follow commands. Breath sounds are coarse. Trachea is midline, Negative for subcutaneous air, and chest excursion is symmetric. Patient is also Negative for cyanosis and is Positive for pitting edema. All alarms are set and audible. Resuscitation bag is at the head of the bed. Ventilator Settings Mode FIO2 Rate Tidal Volume Pressure PEEP I:E Ratio VC+  50 %   20 380 ml    10 cm H20  1:1.4 Peak airway pressure: 39 cm H2O Minute ventilation: 13.2 l/min ABG: No results for input(s): PH, PCO2, PO2, HCO3 in the last 72 hours.  
 
 
Richard Fink, RT

## 2021-02-01 NOTE — PROGRESS NOTES
Ventilator check complete; patient has a #6. 0 Shiley tracheostomy. Patient is not sedated. Patient is not able to follow commands. Breath sounds are coarse. Trachea is midline, Negative for subcutaneous air, and chest excursion is symmetric. Patient is also Negative for cyanosis and is Negative for pitting edema. All alarms are set and audible. Resuscitation bag is at the head of the bed. Ventilator Settings Mode FIO2 Rate Tidal Volume Pressure PEEP I:E Ratio VC+  50 %   20 bpm 380 ml    10 cm H20  1:1.2 Peak airway pressure: 35 cm H2O Minute ventilation: 14 l/min ABG:  
 
Gianna Burgess RT

## 2021-02-01 NOTE — PROGRESS NOTES
Bedside shift change report given to Mk Moon (oncoming nurse) by Nisha Yeh (offgoing nurse). Report included the following information SBAR, Kardex, Intake/Output, MAR, Recent Results and Cardiac Rhythm NSR. Pt in bed with eyes closed, awakens to stimulation but does not open eyes. Does not follow commands. Pt on ventilator with tracheostomy. Lung fields coarse bilaterally. S1 S2 auscultated, RRR. Bowel sounds active in all quadrants. Clifton patent and draining turbid yellow urine with sediment. Pulses palpable in all extremities, edema noted to all extremities. Pt restless upon stimulation, RR increased to 40s, O2 sat decreased to 80s. Pt moving around in bed non purposefully. PRN fentanyl given.

## 2021-02-01 NOTE — PROGRESS NOTES
Received call from patient's daughter Cordell Manley. Daughter is known to me from prior phone calls when I provided family care. Ms. Karlos Tirado requested 's support upon her arrival to the hospital today to meet with staff. During the call I offered spiritual/emotioanl support to Ms. Karlos Tirado as she plans to make a decision regarding care. After the phone call I notified the  covering the unit, Beth Armstrong, and spoke to patient's nurse, Vivian Chapman. Staff plans to call the  [phone]) when Ms. Karlos Tirado arrives for the meeting. Alana Hickman MDiv Board Certified [employer]

## 2021-02-01 NOTE — PROGRESS NOTES
Bedside shift change report given to Marcela Suresh (oncoming nurse) by [facility], RN (offgoing nurse). Report included the following information SBAR, Kardex, ED Summary, Procedure Summary, Intake/Output, MAR, Recent Results and Cardiac Rhythm SR/ST.

## 2021-02-01 NOTE — PROGRESS NOTES
Bedside and Verbal shift change report given to Maxx Guadarrama RN (oncoming nurse) by Noe Reed RN (offgoing nurse). Report included the following information SBAR, Kardex, ED Summary, Intake/Output and Recent Results.

## 2021-02-01 NOTE — CONSULTS
This is a duplicate consult. Please see original consult note and subsequent follow up notes. Arliss Hodgkins, KYLEP-C Palliative Care

## 2021-02-01 NOTE — PROGRESS NOTES
Spiritual Care Visit, initial visit. Requested by patient's daughter and by staff. Visited with patient at bedside. Listened at length as daughter spoke of patient's nearing the end of her life on earth. Affirmed daughter's sonia and emotions. Daughter is decision maker; She believes it will be best for her to allow her mother to be removed from [facility]. She was waiting to speak with Palliative Care about pending actions. Other family members are to arrive later. Prayed for patien and for family. Visit by Jennifer Tyson, Staff .  Aaron, Th.B., B.A.

## 2021-02-01 NOTE — PROGRESS NOTES
CM continues to follow for discharge planning and/or CM needs. No CM needs noted or voiced at this time. Will continue to monitor and update as needed.

## 2021-02-01 NOTE — PROGRESS NOTES
Jose Mann Admission Date: 12/24/2020 Daily Progress Note: 2/1/2021 Admitted 12/24 with acute respiratory failure secondary to COVID 19. Rx with Decadron and plasma. She was in renal failure on admission so did not get Remdesivir. She was initially on bipap but suffered with a cardiac arrest on 12/30 so was intubated at that time. She has remained vent dependent since then. Rx for an E coli UTI. Also grew MRSA in her sputum. Now off abx. Has acute on chronic renal failure - started on dialysis on 1/12 but held on 1/25 with increased urine output - nephrology has signed off. Trach placed 1/19. GI holding on PEG due to multiple medical problems - ? Change in category status. Neurology evaluating - abnormal EEG with moderate to severe slowing. MRI 1/26 -   Stable age-related senescent changes and chronic microvascular disease with remote right posterior temporal occipital ischemic insult. No evidence anoxic brain injury per neuro - deep encephalopathy. Subjective:  
 
Over the past 24 hours: 
Hypertensive. On 50% FiO2, sats marginal 
Hgb down to 6.8, S/P transfusion. Review of Systems Review of systems not obtained due to patient factors. on vent Current Facility-Administered Medications Medication Dose Route Frequency  0.9% sodium chloride infusion 250 mL  250 mL IntraVENous PRN  
 LORazepam (ATIVAN) injection 2 mg  2 mg IntraVENous Q4H PRN  
 0.9% sodium chloride infusion 250 mL  250 mL IntraVENous PRN  
 fentaNYL citrate (PF) injection 50 mcg  50 mcg IntraVENous Q1H PRN  
 insulin glargine (LANTUS) injection 30 Units  30 Units SubCUTAneous QHS  hydrALAZINE (APRESOLINE) 20 mg/mL injection 10 mg  10 mg IntraVENous Q6H PRN  
 0.9% sodium chloride infusion 250 mL  250 mL IntraVENous PRN  
 famotidine (PEPCID) tablet 20 mg  20 mg Oral DAILY  insulin regular (NOVOLIN R, HUMULIN R) injection   SubCUTAneous BID  
  [Held by provider] guanFACINE IR (TENEX) tablet 1 mg  1 mg Oral BID  docusate (COLACE) 50 mg/5 mL oral liquid 100 mg  100 mg Per NG tube DAILY PRN  
 heparin (porcine) 1,000 unit/mL injection 5,000 Units  5,000 Units IntraVENous DIALYSIS PRN  
 alcohol 62% (NOZIN) nasal  1 Ampule  1 Ampule Topical Q12H  
 0.9% sodium chloride infusion 250 mL  250 mL IntraVENous PRN  
 heparin (porcine) injection 5,000 Units  5,000 Units SubCUTAneous Q8H  
 bisacodyL (DULCOLAX) suppository 10 mg  10 mg Rectal DAILY PRN  
 NUTRITIONAL SUPPORT ELECTROLYTE PRN ORDERS   Does Not Apply PRN  
 hydrALAZINE (APRESOLINE) tablet 10 mg  10 mg Per NG tube TID  dextrose 40% (GLUTOSE) oral gel 1 Tube  15 g Oral PRN  
 glucagon (GLUCAGEN) injection 1 mg  1 mg IntraMUSCular PRN  
 dextrose (D50W) injection syrg 12.5-25 g  25-50 mL IntraVENous PRN  
 albuterol (PROVENTIL HFA, VENTOLIN HFA, PROAIR HFA) inhaler 2 Puff  2 Puff Inhalation Q4H PRN  phenol throat spray (CHLORASEPTIC) 1 Spray  1 Spray Oral PRN  
 lip protectant (BLISTEX) ointment 1 Each  1 Each Topical PRN  
 acetaminophen (TYLENOL) tablet 650 mg  650 mg Oral Q6H PRN  
 sodium chloride (NS) flush 5-40 mL  5-40 mL IntraVENous Q8H  
 sodium chloride (NS) flush 5-40 mL  5-40 mL IntraVENous PRN  
 albuterol (PROVENTIL VENTOLIN) nebulizer solution 2.5 mg  2.5 mg Nebulization Q6H RT  
 
 
 
Objective:  
 
Vitals:  
 02/01/21 0023 02/01/21 1183 02/01/21 2660 02/01/21 7365 BP: (!) 191/92 (!) 159/75 (!) 162/82 Pulse: 90 93 91 91 Resp: 15 (!) 41 (!) 37 (!) 41 Temp:      
SpO2: 98% 94% 91% 93% Weight:      
Height:      
 
Intake and Output:  
01/30 1901 - 02/01 0700 In: 1857 Out: 6976 [GAGKN:4311] 02/01 0701 - 02/01 1900 In: 378.8 Out: - Intake/Output Summary (Last 24 hours) at 2/1/2021 6284 Last data filed at 2/1/2021 7789 Gross per 24 hour Intake 1638.8 ml Output 1150 ml Net 488.8 ml Physical Exam: GEN: critically ill on vent HEENT:NG 
NECK:  no JVD, no retractions, no thyromegaly or masses, trach LUNGS:  Tachypnea, coarse bilaterally on vent HEART:  RRR with no M,G,R; NSR 
ABDOMEN:  soft; positive bowel sounds present EXTREMITIES:  warm with no cyanosis, no lower leg edema SKIN:  no jaundice or ecchymosis NEURO: responds to pain only, does not follow commands, no tracking LINES/DRAINS: 
Quad lumen,  
NG 
Trach- # 6, cuffed Clifton DRIPS: none ACTIVITY: bedridden, on vent NUTRITION: tube feedings CHEST XRAY:  
 
 
LAB Recent Labs 02/01/21 
0320 01/31/21 
0330 01/30/21 
1049 01/30/21 
7321 WBC 15.6* 19.5*  --  16.7* HGB 6.8* 7.7* 6.7* 6.9*  
HCT 22.8* 25.5* 22.6* 22.2*  
 216  --  219 Recent Labs 02/01/21 0320 01/31/21 
0330 01/30/21 
8351  142 142  
K 4.5 4.7 4.5  
 105 105 CO2 37* 35* 36* * 209* 230* * 112* 106* CREA 2.20* 2.17* 2.29* MG 2.8*  --   --   
PHOS 3.1  --   -- No results for input(s): PH, PCO2, PO2, HCO3 in the last 72 hours. No results for input(s): LCAD, LAC in the last 72 hours. ABG:   
Lab Results Component Value Date/Time PHI 7.34 (L) 02/01/2021 03:38 AM  
 PCO2I 67.6 (HH) 02/01/2021 03:38 AM  
 PO2I 100 02/01/2021 03:38 AM  
 HCO3I 36.3 (H) 02/01/2021 03:38 AM  
 FIO2I 50 02/01/2021 03:38 AM  
 
MRI IMPRESSION 
  
1.  Stable age-related senescent changes and chronic microvascular disease with 
remote right posterior temporal occipital ischemic insult. 
  
 
Assessment:  
 
Hospital Problems  Date Reviewed: 8/10/2019 Codes Class Noted POA Cardiac arrest [facility]) ICD-10-CM: I46.9 ICD-9-CM: 427.5  12/30/2020 No  
   
 COVID-19 ICD-10-CM: U07.1 ICD-9-CM: 079.89  12/24/2020 Yes  * (Principal) Acute respiratory distress syndrome (ARDS) due to severe acute respiratory syndrome coronavirus 2 (SARS-CoV-2) ([facility]) ICD-10-CM: U07.1, J80 
ICD-9-CM: 518.82, 079.89  12/24/2020 No  
   
 Acute hypoxemic respiratory failure ([facility] [address] ICD-10-CM: J96.01 
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 On the vent since 12/30, no improvement. Ongoing deep encephalopathy and agitation, neurology has seen. PC following, ongoing discussions with family. PLAN: 
Ongoing agitation, only responds to pain. LT prognosis poor. PC following. Ongoing discussions with family. Transfuse < 7. Watch hgb Control HTN, PRNs ordered. No progress. More than 50% of time documented was spent in face-to-face contact with the patient and in the care of the patient on the floor/unit where the patient is located. Total time spent 31 Nora Horner NP Lungs:  Coarse, 50% Vent-no improvement Heart:  RRR with no Murmur/Rubs/Gallops Additional Comments:  Gets agitated and getting fentanyl, ativan pushes. Restart Precedex, try to keep off benzos, fentanyl as able. Family asked about modafinil. Don't think will change anything, but doubt harmful to try. Will add lasix for diuresis. Discussed with daughter poor prognosis, ongoing PRATIMA, unsure if will stay off CRRT, poor neuro status and ongoing severe respiratory failure. She is coming in today to discuss with , nursing and then family about goals of care. I have spoken with and examined the patient. I agree with the above assessment and plan as documented.  
 
Kaylyn Pink MD

## 2021-02-02 NOTE — PROGRESS NOTES
Comprehensive Nutrition Assessment Type and Reason for Visit: Mai Curtis Tube Feeding Management (Pulmonary) Nutrition Recommendations/Plan:  
· Enteral Nutrition: · Continue current enteral regimen: · Nepro via OGT at goal rate of 35ml/hr. · 1 packet Prosource TF to be given at 1800 with 50ml free water flush before and after. · Water flush to 65ml Q4H. Malnutrition Assessment: 
Malnutrition Status: Insufficient data Nutrition Assessment:  
Nutrition History: 12/31: 3 recorded meals in past 6 days with average intake of 83% Nutrition Background: H/O: CAD, HTN, DM, HLD, peripheral neuropathy, CKD stage III. Presented with dyspnea. Findings of +COVID. Admitted to ICU with ARDS, severe acute hypoxic due to COVID PNA, PRATIMA on CKD. Was requiring BIPAP at night and Aviro during day. Had brief cardiac arrest and was intubated 12/30 Daily Update: 
Palliative care following. Continues with severe encephalopathy. Abdominal Status (last documented): Obese, Semi-soft abdomen with Active  bowel sounds. Last BM 02/01/21. Pertinent Medications: pepcid, lasix (80 mg), lantus (30 units), SSI BID, precedex Pertinent Labs:  
Lab Results Component Value Date/Time Sodium 146 (H) 02/02/2021 03:38 AM  
 Potassium 5.0 02/02/2021 03:38 AM  
 Chloride 108 (H) 02/02/2021 03:38 AM  
 CO2 35 (H) 02/02/2021 03:38 AM  
 Anion gap 3 (L) 02/02/2021 03:38 AM  
 Glucose 121 (H) 02/02/2021 03:38 AM  
  (H) 02/02/2021 03:38 AM  
 Creatinine 2.12 (H) 02/02/2021 03:38 AM  
 Calcium 10.4 02/02/2021 03:38 AM  
 Albumin 3.0 (L) 01/06/2021 02:02 AM  
 Phosphorus 3.1 02/01/2021 03:20 AM  
K and Na trending up, +lasix, on renal controlled formula. Nutrition Related Findings:  
Intubated and OGT placed 12/30. CRRT initiated on 1/12. TF formula changed to a renal formula on 1/11. s/p Trach 1/19, NG placed 1/19. Current Nutrition Therapies: DIET NPO 
 DIET TUBE FEEDING Administer 1 pouches Prosource TF via open syringe into FT @ 1800 daily. Flush FT with 50 ml water before and after administration of protein. Current Tube Feeding (TF) Orders: · Feeding Route: Nasogastric · Formula: Nepro · Schedule:Continuous · Regimen: 35 ml/hr · Additives/Modulars: Protein(3 packets prosource tube feeding q day) · Water Flushes: 65 ml q 4 hours · Current TF & Flush Orders Provides: infusing at goal 
· Goal TF & Flush Orders Provides: 1632 kcal (100% kcal needs), 101 gm PRO (100% estimated protein needs), 1059 ml fluid/day (100% of fluid needs) Current Intake: Average Meal Intake: NPO Average Supplement Intake: NPO Additional Caloric Sources: ( ) Anthropometric Measures: 
Height: 5' 5\" (165.1 cm) Current Body Wt: 75.3 kg (166 lb 0.1 oz)(2/1), Weight source: Not specified BMI: 27.6, Overweight (BMI 25.0-29. 9) Admission Body Weight: 159 lb 2.8 oz(Bedscale) Ideal Body Wt: 125 lbs (57 kg), 123.8 % Usual Body Wt: 73.5 kg (162 lb)(Per EMR 11/5/82020), Percent weight change: -4.5 Estimated Daily Nutrient Needs: 
Energy (kcal/day): 9329-6637 (Kcal/kg(25-30), Weight Used: Ideal(56.8)) Protein (g/day): 68-86 (1.2-1.5 g/kg) Weight Used: (Ideal) Fluid (ml/day):   (Standard renal) Nutrition Diagnosis:  
· Inadequate oral intake related to impaired respiratory function as evidenced by (intubated, NPO, TF to meet needs) Nutrition Interventions:  
Food and/or Nutrient Delivery: Continue NPO, Continue tube feeding Coordination of Nutrition Care: Continue to monitor while inpatient Plan of Care discussed with Diego Paniagua RN. Goals:  
Previous Goal Met: Goal(s) achieved Active Goal: Maintain TF to meet estimated needs Nutrition Monitoring and Evaluation:  
  
Food/Nutrient Intake Outcomes: Enteral nutrition intake/tolerance Physical Signs/Symptoms Outcomes: Biochemical data, GI status Discharge Planning: Too soon to determine Rodo Ribera RD, LDN on 2/2/2021 at 10:54 AM 
Contact: [phone] Disaster Mode active

## 2021-02-02 NOTE — PROGRESS NOTES
Anita Layne Admission Date: 12/24/2020 Daily Progress Note: 2/2/2021 Admitted 12/24 with acute respiratory failure secondary to COVID 19. Rx with Decadron and plasma. She was in renal failure on admission so did not get Remdesivir. She was initially on bipap but suffered with a cardiac arrest on 12/30 so was intubated at that time. She has remained vent dependent since then. Rx for an E coli UTI. Also grew MRSA in her sputum. Now off abx. Has acute on chronic renal failure - started on dialysis on 1/12 but held on 1/25 with increased urine output - nephrology has signed off. Trach placed 1/19. GI holding on PEG due to multiple medical problems - ? Change in category status. Neurology evaluating - abnormal EEG with moderate to severe slowing. MRI 1/26 -   Stable age-related senescent changes and chronic microvascular disease with remote right posterior temporal occipital ischemic insult. No evidence anoxic brain injury per neuro - deep encephalopathy. Subjective:  
 
Over the past 24 hours: 
Remains critically ill. On 50% FiO2, sats marginal. Back on precedex for agitation. Review of Systems Review of systems not obtained due to patient factors. on vent Current Facility-Administered Medications Medication Dose Route Frequency  0.9% sodium chloride infusion 250 mL  250 mL IntraVENous PRN  
 furosemide (LASIX) injection 80 mg  80 mg IntraVENous DAILY  modafiniL (PROVIGIL) tablet 100 mg  100 mg Oral DAILY  dexmedeTOMidine in 0.9 % NaCl (PRECEDEX) 400 mcg/100 mL (4 mcg/mL) infusion soln  0.1-1.5 mcg/kg/hr IntraVENous TITRATE  LORazepam (ATIVAN) injection 2 mg  2 mg IntraVENous Q4H PRN  
 0.9% sodium chloride infusion 250 mL  250 mL IntraVENous PRN  
 fentaNYL citrate (PF) injection 50 mcg  50 mcg IntraVENous Q1H PRN  
 insulin glargine (LANTUS) injection 30 Units  30 Units SubCUTAneous QHS  hydrALAZINE (APRESOLINE) 20 mg/mL injection 10 mg  10 mg IntraVENous Q6H PRN  
 0.9% sodium chloride infusion 250 mL  250 mL IntraVENous PRN  
 famotidine (PEPCID) tablet 20 mg  20 mg Oral DAILY  insulin regular (NOVOLIN R, HUMULIN R) injection   SubCUTAneous BID  [Held by provider] guanFACINE IR (TENEX) tablet 1 mg  1 mg Oral BID  docusate (COLACE) 50 mg/5 mL oral liquid 100 mg  100 mg Per NG tube DAILY PRN  
 heparin (porcine) 1,000 unit/mL injection 5,000 Units  5,000 Units IntraVENous DIALYSIS PRN  
 alcohol 62% (NOZIN) nasal  1 Ampule  1 Ampule Topical Q12H  
 0.9% sodium chloride infusion 250 mL  250 mL IntraVENous PRN  
 heparin (porcine) injection 5,000 Units  5,000 Units SubCUTAneous Q8H  
 bisacodyL (DULCOLAX) suppository 10 mg  10 mg Rectal DAILY PRN  
 NUTRITIONAL SUPPORT ELECTROLYTE PRN ORDERS   Does Not Apply PRN  
 hydrALAZINE (APRESOLINE) tablet 10 mg  10 mg Per NG tube TID  dextrose 40% (GLUTOSE) oral gel 1 Tube  15 g Oral PRN  
 glucagon (GLUCAGEN) injection 1 mg  1 mg IntraMUSCular PRN  
 dextrose (D50W) injection syrg 12.5-25 g  25-50 mL IntraVENous PRN  
 albuterol (PROVENTIL HFA, VENTOLIN HFA, PROAIR HFA) inhaler 2 Puff  2 Puff Inhalation Q4H PRN  phenol throat spray (CHLORASEPTIC) 1 Spray  1 Spray Oral PRN  
 lip protectant (BLISTEX) ointment 1 Each  1 Each Topical PRN  
 acetaminophen (TYLENOL) tablet 650 mg  650 mg Oral Q6H PRN  
 sodium chloride (NS) flush 5-40 mL  5-40 mL IntraVENous Q8H  
 sodium chloride (NS) flush 5-40 mL  5-40 mL IntraVENous PRN  
 albuterol (PROVENTIL VENTOLIN) nebulizer solution 2.5 mg  2.5 mg Nebulization Q6H RT  
 
Objective:  
 
Vitals:  
 02/02/21 0693 02/02/21 0622 02/02/21 7408 02/02/21 7044 BP: 128/66 128/67  (!) 141/69 Pulse: 65 67 86 69 Resp: (!) 36 (!) 39 (!) 51 Temp:      
SpO2: 98% 95% 96% Weight:      
Height:      
 
Intake and Output:  
01/31 1901 - 02/02 0700 In: 2672.1 [I.V.:192.3] Out: 2375 [Urine:2375] No intake/output data recorded. Intake/Output Summary (Last 24 hours) at 2/2/2021 0831 Last data filed at 2/2/2021 3300 Gross per 24 hour Intake 1670.29 ml Output 1825 ml Net -154.71 ml Physical Exam:         
GEN: critically ill on vent HEENT:NG 
NECK:  no JVD, no retractions, no thyromegaly or masses, trach LUNGS:  Tachypnea, coarse bilaterally on vent HEART:  RRR with no M,G,R; NSR 
ABDOMEN:  soft; positive bowel sounds present EXTREMITIES:  warm with no cyanosis, no lower leg edema SKIN:  no jaundice or ecchymosis NEURO: responds to pain only, does not follow commands, no tracking LINES/DRAINS: 
Quad lumen,  
NG 
Trach- # 6, cuffed [place] DRIPS: none ACTIVITY: bedridden, on vent NUTRITION: tube feedings CHEST XRAY:  
 
 
LAB Recent Labs 02/02/21 
0338 02/01/21 
0930 02/01/21 
0320 01/31/21 
0330 WBC 11.5*  --  15.6* 19.5* HGB 8.1* 7.9* 6.8* 7.7* HCT 27.1* 25.6* 22.8* 25.5*  
  --  217 216 Recent Labs 02/02/21 
3606 02/01/21 
0320 01/31/21 
0330 * 145 142  
K 5.0 4.5 4.7 * 107 105 CO2 35* 37* 35* * 183* 209* * 120* 112* CREA 2.12* 2.20* 2.17* MG  --  2.8*  --   
PHOS  --  3.1  --   
 
 
ABG:   
Lab Results Component Value Date/Time PHI 7.41 02/02/2021 04:05 AM  
 PCO2I 62.7 (HH) 02/02/2021 04:05 AM  
 PO2I 80 02/02/2021 04:05 AM  
 HCO3I 39.9 (H) 02/02/2021 04:05 AM  
 FIO2I 50 02/02/2021 04:05 AM  
 
MRI IMPRESSION 
  
1.  Stable age-related senescent changes and chronic microvascular disease with 
remote right posterior temporal occipital ischemic insult. 
  
 
Assessment:  
 
Hospital Problems  Date Reviewed: 8/10/2019 Codes Class Noted POA Cardiac arrest [facility]) ICD-10-CM: I46.9 ICD-9-CM: 427.5  12/30/2020 No  
   
 COVID-19 ICD-10-CM: U07.1 ICD-9-CM: 079.89  12/24/2020 Yes * (Principal) Acute respiratory distress syndrome (ARDS) due to severe acute respiratory syndrome coronavirus 2 (SARS-CoV-2) ([facility]) ICD-10-CM: U07.1, J80 
ICD-9-CM: 518.82, 079.89  12/24/2020 No  
   
 Acute hypoxemic respiratory failure ([facility] [address] ICD-10-CM: J96.01 
ICD-9-CM: 518.81  12/24/2020 Yes Acute on chronic renal failure (HCC) ICD-10-CM: N17.9, N18.9 ICD-9-CM: 584.9, 585.9  9/17/2019 Yes Coronary artery disease involving coronary bypass graft of native heart without angina pectoris (Chronic) ICD-10-CM: E29.047 ICD-9-CM: 414.05  6/15/2015 Yes Uncontrolled type 2 diabetes mellitus with hyperglycemia (HCC) (Chronic) ICD-10-CM: E11.65 ICD-9-CM: 250.02  6/15/2015 Yes Diabetes mellitus with hyperosmolarity without hyperglycemic hyperosmolar nonketotic coma (HCC) (Chronic) ICD-10-CM: E11.00 ICD-9-CM: 250.20  11/24/2014 Yes Here with COVID 19, acute respiratory failure and s/p cardiac arrest. On the vent since 12/30, no improvement. Ongoing deep encephalopathy and agitation, neurology has seen. PC following, ongoing discussions with family. PLAN: 
Ongoing agitation, only responds to pain. She is now on precedex. LT prognosis poor. Per PC, anticipates family transitioning pt to comfort measures on Thursday or Friday. Supportive care More than 50% of time documented was spent in face-to-face contact with the patient and in the care of the patient on the floor/unit where the patient is located. Karina Taylor NP Lungs:  Coarse, some squeeks Heart:  RRR with no Murmur/Rubs/Gallops Additional Comments:  No progress. Prognosis with post COVID multisystem organ failure including ARDS, severe encephalopathy and CKD seems very poor. PC is helping family with transition and they are considering comfort late this week. Her I/O are better, nearly even on Lasix daily 80mg. Renal function is stable, but borderline. I have spoken with and examined the patient. I agree with the above assessment and plan as documented.  
 
Joni Holstein, MD

## 2021-02-02 NOTE — PROGRESS NOTES
Ventilator check complete; patient has a #6 XLT tracheostomy. Patient is sedated. Patient is not able to follow commands. Breath sounds are coarse and diminished. Trachea is midline, Negative for subcutaneous air, and chest excursion is symmetric. Patient is also Negative for cyanosis and is Positive for pitting edema. All alarms are set and audible. Resuscitation bag is at the head of the bed. Ventilator Settings Mode FIO2 Rate Tidal Volume Pressure PEEP I:E Ratio VC+  50 %   20 320 ml    10 cm H20  1:2.53 Peak airway pressure: 46 cm H2O Minute ventilation: 12.2 l/min ABG: No results for input(s): PH, PCO2, PO2, HCO3 in the last 72 hours. Jeanie Martinez

## 2021-02-02 NOTE — PROGRESS NOTES
Bedside and Verbal shift change report given to Chon Melendez RN  (oncoming nurse) by Tam Stover (offgoing nurse). Report included the following information SBAR, Kardex, Intake/Output and Recent Results.

## 2021-02-03 NOTE — PROGRESS NOTES
Palliative Care Progress Note Patient: Taran Reid MRN: [id number]  SSN: [id number] YOB: 1940  Age: 78 y.o. Sex: female Assessment/Plan: Chief Complaint/Interval History:  Remains unresponsive, ventilated via trach Principal Diagnosis:   
· Encephalopathy, Unspecified  G93.40 Additional Diagnoses: · Acute Respiratory Failure, Unspecified  J96.00 · Debility, Unspecified  R53.81 
· Failure to Thrive  R62.7 · Fatigue, Lethargy  R53.83 
· Frailty  R54 · Counseling, Encounter for Medical Advice  Z71.9 
· Encounter for Palliative Care  Z51.5 Palliative Performance Scale (PPS) Medical Decision Making:  
Reviewed and summarized notes over last week Discussed case with appropriate providers-  
Reviewed laboratory and x-ray data over last week Pt remains unresponsive, ventilated via trach. There have been no changes in her condition. Spoke with her daughter, Rosette Kocher, via phone. She states family is still visiting, and the last few will be coming tomorrow. Rosette Kocher would like to proceed with comfort care on Friday, in order to give all family a change to visit. Provided support. Briefly reviewed the process of comfort measures, and medications used. Rosette Kocher voiced understanding. Will continue to follow. Will discuss findings with members of the interdisciplinary team.   
 
  
More than 50% of this 35 minute visit was spent counseling and coordination of care as outlined above. Subjective:  
 
Review of Systems: 
Review of systems not obtained due to patient factors- AMS Objective:  
 
Visit Vitals BP (!) 144/74 Pulse 60 Temp 97.2 °F (36.2 °C) Resp (!) 38 Ht 5' 5\" (1.651 m) Wt 165 lb 11.2 oz (75.2 kg) SpO2 97% BMI 27.57 kg/m² Physical Exam: 
 
General:  Poorly responsive. Debilitated and frail Eyes:  Conjunctivae/corneas clear Nose: Nares normal. Septum midline. Neck: Supple, symmetrical, trachea midline. Trach with vent Lungs:   Coarse bilaterally, unlabored Heart:  Regular rate and rhythm Abdomen:   Soft, non-tender, non-distended Extremities: Normal, atraumatic, no cyanosis or edema Skin: Skin color, texture, turgor normal  
Neurologic: Does not follow commands Psych: Alert and oriented Signed By: Rea Maciasole, NP February 3, 2021

## 2021-02-03 NOTE — PROGRESS NOTES
Ventilator check complete; patient has a # 6 XLT tracheostomy. Patient is sedated. Patient is not able to follow commands. Breath sounds are coarse. Trachea is midline, Negative for subcutaneous air, and chest excursion is symmetric. Patient is also Negative for cyanosis and is Negative for pitting edema. All alarms are set and audible. Resuscitation bag is at the head of the bed. Ventilator Settings Mode FIO2 Rate Tidal Volume Pressure PEEP I:E Ratio VC+  50 %   20 bpm 320 ml   10 cm H20  1:2.4 Peak airway pressure: 32 cm H2O Minute ventilation: 8 l/min Marielle Gomez, RT

## 2021-02-03 NOTE — PROGRESS NOTES
Ventilator check complete; patient has a #6 tracheostomy. Patient is sedated. Patient is not able to follow commands. Breath sounds are diminished. Trachea is midline, Negative for subcutaneous air, and chest excursion is symmetric. Patient is also Negative for cyanosis and is Negative for pitting edema. All alarms are set and audible. Resuscitation bag is at the head of the bed. Ventilator Settings Mode FIO2 Rate Tidal Volume Pressure PEEP I:E Ratio VC+  50 %   20 320 ml  12 cm H2O  10 cm H20  1:2.5 Peak airway pressure: 34 cm H2O Minute ventilation: 9 l/min [address], RT

## 2021-02-03 NOTE — PROGRESS NOTES
Bedside, Verbal and Written shift change report given to Shavon Oseguera RN (oncoming nurse) by Janeth Guzmán RN (offgoing nurse). Report included the following information SBAR, Kardex, ED Summary, Procedure Summary, Intake/Output, MAR, Recent Results and Cardiac Rhythm NSR.

## 2021-02-03 NOTE — PROGRESS NOTES
Corie Jo Admission Date: 12/24/2020 Daily Progress Note: 2/3/2021 Admitted 12/24 with acute respiratory failure secondary to COVID 19. Rx with Decadron and plasma. She was in renal failure on admission so did not get Remdesivir. She was initially on bipap but suffered with a cardiac arrest on 12/30 so was intubated at that time. She has remained vent dependent since then. Rx for an E coli UTI. Also grew MRSA in her sputum. Now off abx. Has acute on chronic renal failure - started on dialysis on 1/12 but held on 1/25 with increased urine output - nephrology has signed off. Trach placed 1/19. GI holding on PEG due to multiple medical problems - ? Change in category status. Neurology evaluating - abnormal EEG with moderate to severe slowing. MRI 1/26 -   Stable age-related senescent changes and chronic microvascular disease with remote right posterior temporal occipital ischemic insult. No evidence anoxic brain injury per neuro - deep encephalopathy. Subjective:  
 
Over the past 24 hours: 
No change, remains intubated for 34 days. On precedex with ongoing tachypnea- RR 50's. + bloody secretions. Does not respond. Review of Systems Review of systems not obtained due to patient factors. on vent Current Facility-Administered Medications Medication Dose Route Frequency  0.9% sodium chloride infusion 250 mL  250 mL IntraVENous PRN  
 furosemide (LASIX) injection 80 mg  80 mg IntraVENous DAILY  modafiniL (PROVIGIL) tablet 100 mg  100 mg Oral DAILY  dexmedeTOMidine in 0.9 % NaCl (PRECEDEX) 400 mcg/100 mL (4 mcg/mL) infusion soln  0.1-1.5 mcg/kg/hr IntraVENous TITRATE  LORazepam (ATIVAN) injection 2 mg  2 mg IntraVENous Q4H PRN  
 0.9% sodium chloride infusion 250 mL  250 mL IntraVENous PRN  
 fentaNYL citrate (PF) injection 50 mcg  50 mcg IntraVENous Q1H PRN  
 insulin glargine (LANTUS) injection 30 Units  30 Units SubCUTAneous QHS  hydrALAZINE (APRESOLINE) 20 mg/mL injection 10 mg  10 mg IntraVENous Q6H PRN  
 0.9% sodium chloride infusion 250 mL  250 mL IntraVENous PRN  
 famotidine (PEPCID) tablet 20 mg  20 mg Oral DAILY  insulin regular (NOVOLIN R, HUMULIN R) injection   SubCUTAneous BID  [Held by provider] guanFACINE IR (TENEX) tablet 1 mg  1 mg Oral BID  docusate (COLACE) 50 mg/5 mL oral liquid 100 mg  100 mg Per NG tube DAILY PRN  
 heparin (porcine) 1,000 unit/mL injection 5,000 Units  5,000 Units IntraVENous DIALYSIS PRN  
 alcohol 62% (NOZIN) nasal  1 Ampule  1 Ampule Topical Q12H  
 0.9% sodium chloride infusion 250 mL  250 mL IntraVENous PRN  
 heparin (porcine) injection 5,000 Units  5,000 Units SubCUTAneous Q8H  
 bisacodyL (DULCOLAX) suppository 10 mg  10 mg Rectal DAILY PRN  
 NUTRITIONAL SUPPORT ELECTROLYTE PRN ORDERS   Does Not Apply PRN  
 hydrALAZINE (APRESOLINE) tablet 10 mg  10 mg Per NG tube TID  dextrose 40% (GLUTOSE) oral gel 1 Tube  15 g Oral PRN  
 glucagon (GLUCAGEN) injection 1 mg  1 mg IntraMUSCular PRN  
 dextrose (D50W) injection syrg 12.5-25 g  25-50 mL IntraVENous PRN  
 albuterol (PROVENTIL HFA, VENTOLIN HFA, PROAIR HFA) inhaler 2 Puff  2 Puff Inhalation Q4H PRN  phenol throat spray (CHLORASEPTIC) 1 Spray  1 Spray Oral PRN  
 lip protectant (BLISTEX) ointment 1 Each  1 Each Topical PRN  
 acetaminophen (TYLENOL) tablet 650 mg  650 mg Oral Q6H PRN  
 sodium chloride (NS) flush 5-40 mL  5-40 mL IntraVENous Q8H  
 sodium chloride (NS) flush 5-40 mL  5-40 mL IntraVENous PRN  
 albuterol (PROVENTIL VENTOLIN) nebulizer solution 2.5 mg  2.5 mg Nebulization Q6H RT  
 
Objective:  
 
Vitals:  
 02/03/21 0700 02/03/21 0707 02/03/21 0723 02/03/21 8793 BP: (!) 149/79 (!) 149/79 (!) 148/79 (!) 150/80 Pulse: 61 61 62 60 Resp: 22 22 30 (!) 39 Temp: 97.3 °F (36.3 °C) SpO2: 98% 98% 99% 99% Weight:      
Height:      
 
Intake and Output:  
02/01 1901 - 02/03 0700 In: 1783.8 [I.V.:391.8] Out: 3015 [facility] [[id number]] No intake/output data recorded. Intake/Output Summary (Last 24 hours) at 2/3/2021 7225 Last data filed at 2/3/2021 5165 Gross per 24 hour Intake 971.53 ml Output 2210 ml Net -1238.47 ml Physical Exam:         
GEN: critically ill on vent HEENT: NG 
NECK:  no JVD, no retractions, no thyromegaly or masses, trach LUNGS:  Tachypnea, coarse bilaterally on vent HEART:  RRR with no M,G,R; NSR 
ABDOMEN:  soft; positive bowel sounds present EXTREMITIES:  warm with no cyanosis, no lower leg edema SKIN:  no jaundice or ecchymosis NEURO: responds only to painful stimulus, does not follow commands, no tracking LINES/DRAINS: 
Quad lumen,  
NG 
Trach- # 6, cuffed [place] DRIPS: none ACTIVITY: bedridden, on vent NUTRITION: tube feedings CHEST XRAY:  
 
 
LAB Recent Labs 02/03/21 
4342 02/02/21 
8675 02/01/21 
0930 02/01/21 
0320 WBC 12.7* 11.5*  --  15.6* HGB 8.6* 8.1* 7.9* 6.8* HCT 28.6* 27.1* 25.6* 22.8*  
 232  --  217 Recent Labs 02/03/21 
8198 02/02/21 
6793 02/01/21 
0320 * 146* 145  
K 5.1 5.0 4.5  
* 108* 107 CO2 38* 35* 37* * 121* 183* * 122* 120* CREA 1.91* 2.12* 2.20* MG 2.6*  --  2.8* PHOS 4.9*  --  3.1 ABG:   
Lab Results Component Value Date/Time PHI 7.39 02/03/2021 03:55 AM  
 PCO2I 65.1 (HH) 02/03/2021 03:55 AM  
 PO2I 117 (H) 02/03/2021 03:55 AM  
 HCO3I 39.1 (H) 02/03/2021 03:55 AM  
 FIO2I 50 02/03/2021 03:55 AM  
 
MRI IMPRESSION 
  
1.  Stable age-related senescent changes and chronic microvascular disease with 
remote right posterior temporal occipital ischemic insult. 
  
 
Assessment:  
 
Hospital Problems  Date Reviewed: 8/10/2019 Codes Class Noted POA Cardiac arrest [facility]) ICD-10-CM: I46.9 ICD-9-CM: 427.5  12/30/2020 No  
   
 COVID-19 ICD-10-CM: U07.1 ICD-9-CM: 079.89  12/24/2020 Yes * (Principal) Acute respiratory distress syndrome (ARDS) due to severe acute respiratory syndrome coronavirus 2 (SARS-CoV-2) ([facility]) ICD-10-CM: U07.1, J80 
ICD-9-CM: 518.82, 079.89  12/24/2020 No  
   
 Acute hypoxemic respiratory failure ([facility] [address] ICD-10-CM: J96.01 
ICD-9-CM: 518.81  12/24/2020 Yes Acute on chronic renal failure ([facility]) ICD-10-CM: N17.9, N18.9 ICD-9-CM: 584.9, 585.9  9/17/2019 Yes Coronary artery disease involving coronary bypass graft of native heart without angina pectoris (Chronic) ICD-10-CM: B47.375 ICD-9-CM: 414.05  6/15/2015 Yes Uncontrolled type 2 diabetes mellitus with hyperglycemia ([facility]) (Chronic) ICD-10-CM: E11.65 ICD-9-CM: 250.02  6/15/2015 Yes Diabetes mellitus with hyperosmolarity without hyperglycemic hyperosmolar nonketotic coma ([facility]) (Chronic) ICD-10-CM: E11.00 ICD-9-CM: 250.20  11/24/2014 Yes Here with COVID 19, acute respiratory failure and s/p cardiac arrest. On the vent since 12/30, no improvement. Ongoing deep encephalopathy and agitation, neurology has seen. PC following, ongoing discussions with family. PLAN: 
Increase free water, on lasix 80 mg IV daily, negative 1.2 liters over the past 24 hours Ongoing tachypnea, only responds to painful stimulus on PC. Per PC, anticipates family transitioning pt to comfort measures on Thursday or Friday. Supportive care, prognosis poor More than 50% of time documented was spent in face-to-face contact with the patient and in the care of the patient on the floor/unit where the patient is located. Sergio Reid NP Lungs : b/l rhonchi Heart S1 and S2 audible, no murmers or rubs appreciated Other Not response to pain. Breathing above vent. F/u with neurology and hopefully then can talk to the family. Unfortunately, do not expect she will improve. Consider comfort measures if family is agreeable.  CHER Braun Must spoke with family this AM 
 
 I have spoken with and examined the patient. I have reviewed the history, examination, assessment, and plan and agree with the above. Pepper Barton MD 
 
 
This note was signed electronically. Errors are unfortunately her likely due to dictation software.

## 2021-02-03 NOTE — PROGRESS NOTES
Spiritual Care Visit, initial visit. Visited with patient at bedside. End of life Care. No family present. Visit by Kathia Jerez, Staff .  Pelon., Odalys.B., B.A.

## 2021-02-03 NOTE — PROGRESS NOTES
followed up with patient and family member at bedside, offered support, assurance of spiritual care staff's prayers. Misael Gamble  BLESSING

## 2021-02-03 NOTE — PROGRESS NOTES
Spiritual Care Visit, follow up visit. Visited with patient at bedside. No family was present. Left a card. Prayed for patient's healing and health. Visit by Mauri Baker, Staff .  Pelon., Odalys.B., B.A.

## 2021-02-04 NOTE — PROGRESS NOTES
Ventilator check complete; patient has a #6 tracheostomy. Patient is not sedated. Patient is not able to follow commands. Breath sounds are diminished. Trachea is midline, Negative for subcutaneous air, and chest excursion is symmetric. Patient is also Negative for cyanosis and is Negative for pitting edema. All alarms are set and audible. Resuscitation bag is at the head of the bed. Ventilator Settings Mode FIO2 Rate Tidal Volume Pressure PEEP I:E Ratio VC+  50 %   20 0.32 ml  12 cm H2O  10 cm H20  1:2.5 Peak airway pressure: 40 cm H2O Minute ventilation: 9.92 l/min [address], [place]

## 2021-02-04 NOTE — PROGRESS NOTES
This was a follow-up visit to the patient. I received an update about the patient from the medical staff. A spiritual presence, emotional support and prayer were extended to the patient. Her nurse shared that the family had decided to move forward with extubation this coming Saturday at 2:00pm. 
 
 
 
Maricela Harada, [address] [facility], [facility]

## 2021-02-04 NOTE — PROGRESS NOTES
Ventilator check complete; patient has a # 6 XLT tracheostomy. Patient is not able to follow commands. Breath sounds are coarse. Trachea is midline, Negative for subcutaneous air, and chest excursion is symmetrical. Patient is also Negative for cyanosis and is Negative for pitting edema. All alarms are set and audible. Resuscitation bag and mask are at the head of the bed.   
  
Ventilator Settings Mode FIO2 Rate Tidal Volume Pressure PEEP I:E Ratio VC+  50 %   20 bpm 320 ml    10 cm H20  1:2.4   
  
Peak airway pressure: 32 cm H2O Minute ventilation: 8.5 l/min

## 2021-02-04 NOTE — PROGRESS NOTES
Spiritual Care Visit, Follow up visit. End of Life Care. Patient did not respond. There was no familiy present. RN said the current plan is for patient to be compassionately extubated on Saturday at 0, after family who live [place] of [place] has opportunity to come and to visit with patient. Visit by Kelly Box, Staff .  SEVERO.Femi., Th.B., B.A.

## 2021-02-04 NOTE — PROGRESS NOTES
Critical Care Daily Progress Note: 2/4/2021 Admission Date: 12/24/2020 The patient's chart is reviewed and the patient is discussed with the staff. Pt is an 79 yo female admitted 12/24 with acute respiratory failure secondary to COVID 19.  Pt was treated with Decadron and convalescent plasma. She had PRATIMA and was treated with Remdesivir. Tyra Gresham was initially on BiPAP but pt had a  cardiac arrest on 12/30 requiring intubation.  She has remained vent dependent and a trach was placed on 1/19. Pt was treated for an E coli UTII as well as MRSA in her sputum and abx were completed. He developed acute on chronic renal failure and started on dialysis on 1/12 but held on 1/25 with increased urine output with improvement and nephrology has signed off. [place]ton Walthall holding on PEG due to multiple medical problems. Pt was seen by Neurology with an abnormal EEG with moderate to severe slowing. Brain MRI 1/26 revealed stable age-related senescent changes and chronic microvascular disease with remote right posterior temporal occipital ischemic insult. No evidence anoxic brain injury per neuro - deep encephalopathy. Palliative Care following and family has decided on comfort care on 2/6 at 2pm.  
Subjective:  
 
Sedated on precedex. Pt remains tachypneic. Pt has already received a dose of fentanyl and is to receive a dose of ativan now. Current Facility-Administered Medications Medication Dose Route Frequency  0.9% sodium chloride infusion 250 mL  250 mL IntraVENous PRN  
 furosemide (LASIX) injection 80 mg  80 mg IntraVENous DAILY  modafiniL (PROVIGIL) tablet 100 mg  100 mg Oral DAILY  dexmedeTOMidine in 0.9 % NaCl (PRECEDEX) 400 mcg/100 mL (4 mcg/mL) infusion soln  0.1-1.5 mcg/kg/hr IntraVENous TITRATE  LORazepam (ATIVAN) injection 2 mg  2 mg IntraVENous Q4H PRN  
 0.9% sodium chloride infusion 250 mL  250 mL IntraVENous PRN  
 fentaNYL citrate (PF) injection 50 mcg  50 mcg IntraVENous Q1H PRN  
  insulin glargine (LANTUS) injection 30 Units  30 Units SubCUTAneous QHS  hydrALAZINE (APRESOLINE) 20 mg/mL injection 10 mg  10 mg IntraVENous Q6H PRN  
 0.9% sodium chloride infusion 250 mL  250 mL IntraVENous PRN  
 famotidine (PEPCID) tablet 20 mg  20 mg Oral DAILY  insulin regular (NOVOLIN R, HUMULIN R) injection   SubCUTAneous BID  [Held by provider] guanFACINE IR (TENEX) tablet 1 mg  1 mg Oral BID  docusate (COLACE) 50 mg/5 mL oral liquid 100 mg  100 mg Per NG tube DAILY PRN  
 heparin (porcine) 1,000 unit/mL injection 5,000 Units  5,000 Units IntraVENous DIALYSIS PRN  
 alcohol 62% (NOZIN) nasal  1 Ampule  1 Ampule Topical Q12H  
 0.9% sodium chloride infusion 250 mL  250 mL IntraVENous PRN  
 heparin (porcine) injection 5,000 Units  5,000 Units SubCUTAneous Q8H  
 bisacodyL (DULCOLAX) suppository 10 mg  10 mg Rectal DAILY PRN  
 NUTRITIONAL SUPPORT ELECTROLYTE PRN ORDERS   Does Not Apply PRN  
 hydrALAZINE (APRESOLINE) tablet 10 mg  10 mg Per NG tube TID  dextrose 40% (GLUTOSE) oral gel 1 Tube  15 g Oral PRN  
 glucagon (GLUCAGEN) injection 1 mg  1 mg IntraMUSCular PRN  
 dextrose (D50W) injection syrg 12.5-25 g  25-50 mL IntraVENous PRN  
 albuterol (PROVENTIL HFA, VENTOLIN HFA, PROAIR HFA) inhaler 2 Puff  2 Puff Inhalation Q4H PRN  phenol throat spray (CHLORASEPTIC) 1 Spray  1 Spray Oral PRN  
 lip protectant (BLISTEX) ointment 1 Each  1 Each Topical PRN  
 acetaminophen (TYLENOL) tablet 650 mg  650 mg Oral Q6H PRN  
 sodium chloride (NS) flush 5-40 mL  5-40 mL IntraVENous Q8H  
 sodium chloride (NS) flush 5-40 mL  5-40 mL IntraVENous PRN  
 albuterol (PROVENTIL VENTOLIN) nebulizer solution 2.5 mg  2.5 mg Nebulization Q6H RT  
 
 
Review of Systems Unobtainable due to patient status. Objective:  
 
Vitals:  
 02/04/21 0900 02/04/21 0915 02/04/21 0930 02/04/21 0945 BP: (!) 145/72 (!) 148/75 (!) 149/71 (!) 155/72 Pulse: 68 71 66 72 Resp: (!) 34 (!) 37 (!) 31 (!) 40 Temp:      
SpO2: 100% 100% 100% 100% Weight:      
Height:      
 
 
 
Intake/Output Summary (Last 24 hours) at 2/4/2021 1013 Last data filed at 2/4/2021 4874 Gross per 24 hour Intake 1806.66 ml Output 3075 ml Net -1268.34 ml Physical Exam:         
Constitutional:  intubated and mechanically ventilated. EENMT:  Sclera clear, pupils equal, oral mucosa moist 
Respiratory: bilateral crackles Cardiovascular:  RRR with no M,G,R; 
Gastrointestinal:  soft with no tenderness; positive bowel sounds present Musculoskeletal:  warm with no cyanosis, no lower extremity edema Skin:  no jaundice or ecchymosis Neurologic: sedated Psychiatric:  sedated LINES:   
Trach, Quad lumen L IJ, NGT, coyle DRIPS:   
Precedex CXR:   
 
 
Ventilator Settings Mode FIO2 Rate Tidal Volume Pressure PEEP  
VC+  50 %    0.32 ml  12 cm H2O  10 cm H20 Peak airway pressure: 40 cm H2O Minute ventilation: 9.92 l/min ABG:  
Recent Labs 02/04/21 
3583 02/03/21 
5828 02/02/21 
0405 PHI 7.41 7.39 7.41  
PCO2I 64.2* 65.1* 62.7*  
PO2I 159* 117* 80  
HCO3I 40.8* 39.1* 39.9* LAB Recent Labs 02/03/21 
2121 02/03/21 
1824 02/03/21 
0813 02/02/21 
2049 02/02/21 
7056 GLUCPOC 133* 103* 168* 131* 138* Recent Labs 02/04/21 
6476 02/03/21 
4342 02/02/21 
5182 WBC 14.1* 12.7* 11.5* HGB 9.0* 8.6* 8.1* HCT 29.7* 28.6* 27.1*  
 238 232 Recent Labs 02/04/21 
7710 02/03/21 
6756 02/02/21 
9635 * 147* 146*  
K 5.2* 5.1 5.0  
 108* 108* CO2 39* 38* 35* * 120* 121* * 121* 122* CREA 1.87* 1.91* 2.12* MG  --  2.6*  --   
PHOS  --  4.9*  --   
CA 10.2 10.7* 10.4 No results for input(s): LCAD, LAC in the last 72 hours. Patient Active Problem List  
Diagnosis Code  Diabetes mellitus with hyperosmolarity without hyperglycemic hyperosmolar nonketotic coma ([facility] [address] E11.00  
  Coronary artery disease involving coronary bypass graft of native heart without angina pectoris I25.810  
 Uncontrolled type 2 diabetes mellitus with hyperglycemia ([facility]) E11.65  
 Essential hypertension I10  
 HLD (hyperlipidemia) E78.5  Chronic kidney disease, stage III (moderate) N18.30  
 Osteopenia M85.80  Vitamin D deficiency E55.9  Gastroesophageal reflux disease without esophagitis K21.9  Other allergic rhinitis J30.89  Peripheral neuropathy G62.9  
 Primary osteoarthritis involving multiple joints M89.49  
 Insomnia G47.00  RLS (restless legs syndrome) G25.81  
 Anemia D64.9  Generalized weakness R53.1  Decreased oral intake R63.8  Acute on chronic renal failure (HCC) N17.9, N18.9  Noncompliance Z91.19  
 Hypoalbuminemia due to protein-calorie malnutrition ([facility] [address] E88.09, E46  
 COVID-19 U07.1  Acute respiratory distress syndrome (ARDS) due to severe acute respiratory syndrome coronavirus 2 (SARS-CoV-2) ([facility]) U07.1, J80  Acute hypoxemic respiratory failure ([facility]) J96.01  
 Cardiac arrest ([facility]) I46.9 Assessment:  (Medical Decision Making) Hospital Problems  Date Reviewed: 2/4/2021 Codes Class Noted POA Cardiac arrest [facility]) ICD-10-CM: I46.9 ICD-9-CM: 427.5  12/30/2020 No  
 With ROSC COVID-19 ICD-10-CM: U07.1 ICD-9-CM: 079.89  12/24/2020 Yes S/p treatment * (Principal) Acute respiratory distress syndrome (ARDS) due to severe acute respiratory syndrome coronavirus 2 (SARS-CoV-2) ([facility]) ICD-10-CM: U07.1, J80 
ICD-9-CM: 518.82, 079.89  12/24/2020 No  
 S/p treatment, now vent dependent Acute hypoxemic respiratory failure ([facility] [address] ICD-10-CM: J96.01 
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  
 
--wean FiO2, maintain vent for now 
--continue IV lasix 80mg daily 
--continue precedex 
--add fentanyl gtt 
--stop labs and CXRs 
--pt is DNR 
--plans for comfort care on 2/6 at 2pm  
 
More than 50% of the time documented was spent in face-to-face contact with the patient and in the care of the patient on the floor/unit where the patient is located. SLIME Oakes Lungs:  Trached, very tachypneic, mild B inspiratory crackles. Heart:  RRR with no Murmur/Rubs/Gallops Additional Comments:   
Patient with ongoing tachypnea. Poor prognosis despite some clearing of her CXR. Told family plans on transition to comfort care on Saturday. Will minimize unhelpful imaging and lab draws in the meantime. Can use fentanyl if needed to help with discomfort in the meantime. Otherwise continue supportive measures until Saturday. I have spoken with and examined the patient. I agree with the above assessment and plan as documented.  
 
Candice Ernst MD

## 2021-02-05 NOTE — PROGRESS NOTES
A follow up visit was made to the patient. Emotional support, spiritual presence and  
prayer were provided for the patient. Plans are for her to be compassionately extubated Saturday. Adia Mae, [address] [facility][note 3], [facility]

## 2021-02-05 NOTE — PROGRESS NOTES
Ventilator check complete; patient has a #6 Shiley  tracheostomy. Patient is sedated. Patient is not able to follow commands. Breath sounds are coarse and diminished. Trachea is midline, Negative for subcutaneous air, and chest excursion is symmetric. Patient is also Negative for cyanosis. All alarms are set and audible. Resuscitation bag is at the head of the bed. Ventilator Settings Mode FIO2 Rate Tidal Volume Pressure PEEP I:E Ratio Assist control, VC+  40 %   20 320 ml  12 cm H2O  10 cm H20  1:2.5 Peak airway pressure: 32 cm H2O Minute ventilation: 8.93 l/min ABG: No results for input(s): PH, PCO2, PO2, HCO3 in the last 72 hours. Cecily Sis

## 2021-02-05 NOTE — PROGRESS NOTES
Rajat Burns Admission Date: 12/24/2020 Daily Progress Note: 2/5/2021 Pt is an 79 yo female admitted 12/24 with acute respiratory failure secondary to COVID 19.  Pt was treated with Decadron and convalescent plasma. She had PRATIMA and was treated with Remdesivir. Sarath Tenorio was initially on BiPAP but pt had a  cardiac arrest on 12/30 requiring intubation.  She has remained vent dependent and a trach was placed on 1/19. Pt was treated for an E coli UTII as well as MRSA in her sputum and abx were completed. He developed acute on chronic renal failure and started on dialysis on 1/12 but held on 1/25 with increased urine output with improvement and nephrology has signed off. Desirae Torres holding on PEG due to multiple medical problems. Pt was seen by Neurology with an abnormal EEG with moderate to severe slowing. Brain MRI 1/26 revealed stable age-related senescent changes and chronic microvascular disease with remote right posterior temporal occipital ischemic insult. No evidence anoxic brain injury per neuro - deep encephalopathy. Palliative Care following and family has decided on comfort care on 2/6 at 2pm.  
 
Subjective:  
 
Pt appears comfortable, on precedex and fentanyl gtts. Per RN, has not required any PRN meds for agitation. Pt remains tachypneic. Afebrile and vital signs stable otherwise. Review of Systems Review of systems not obtained due to patient factors. Current Facility-Administered Medications Medication Dose Route Frequency  fentaNYL in normal saline (pf) 25 mcg/mL infusion  0-200 mcg/hr IntraVENous TITRATE  
 0.9% sodium chloride infusion 250 mL  250 mL IntraVENous PRN  
 furosemide (LASIX) injection 80 mg  80 mg IntraVENous DAILY  modafiniL (PROVIGIL) tablet 100 mg  100 mg Oral DAILY  dexmedeTOMidine in 0.9 % NaCl (PRECEDEX) 400 mcg/100 mL (4 mcg/mL) infusion soln  0.1-1.5 mcg/kg/hr IntraVENous TITRATE  LORazepam (ATIVAN) injection 2 mg  2 mg IntraVENous Q4H PRN  
 0.9% sodium chloride infusion 250 mL  250 mL IntraVENous PRN  
 fentaNYL citrate (PF) injection 50 mcg  50 mcg IntraVENous Q1H PRN  
 insulin glargine (LANTUS) injection 30 Units  30 Units SubCUTAneous QHS  hydrALAZINE (APRESOLINE) 20 mg/mL injection 10 mg  10 mg IntraVENous Q6H PRN  
 0.9% sodium chloride infusion 250 mL  250 mL IntraVENous PRN  
 famotidine (PEPCID) tablet 20 mg  20 mg Oral DAILY  insulin regular (NOVOLIN R, HUMULIN R) injection   SubCUTAneous BID  [Held by provider] guanFACINE IR (TENEX) tablet 1 mg  1 mg Oral BID  docusate (COLACE) 50 mg/5 mL oral liquid 100 mg  100 mg Per NG tube DAILY PRN  
 heparin (porcine) 1,000 unit/mL injection 5,000 Units  5,000 Units IntraVENous DIALYSIS PRN  
 alcohol 62% (NOZIN) nasal  1 Ampule  1 Ampule Topical Q12H  
 0.9% sodium chloride infusion 250 mL  250 mL IntraVENous PRN  
 heparin (porcine) injection 5,000 Units  5,000 Units SubCUTAneous Q8H  
 bisacodyL (DULCOLAX) suppository 10 mg  10 mg Rectal DAILY PRN  
 NUTRITIONAL SUPPORT ELECTROLYTE PRN ORDERS   Does Not Apply PRN  
 hydrALAZINE (APRESOLINE) tablet 10 mg  10 mg Per NG tube TID  dextrose 40% (GLUTOSE) oral gel 1 Tube  15 g Oral PRN  
 glucagon (GLUCAGEN) injection 1 mg  1 mg IntraMUSCular PRN  
 dextrose (D50W) injection syrg 12.5-25 g  25-50 mL IntraVENous PRN  
 albuterol (PROVENTIL HFA, VENTOLIN HFA, PROAIR HFA) inhaler 2 Puff  2 Puff Inhalation Q4H PRN  phenol throat spray (CHLORASEPTIC) 1 Spray  1 Spray Oral PRN  
 lip protectant (BLISTEX) ointment 1 Each  1 Each Topical PRN  
 acetaminophen (TYLENOL) tablet 650 mg  650 mg Oral Q6H PRN  
 sodium chloride (NS) flush 5-40 mL  5-40 mL IntraVENous Q8H  
 sodium chloride (NS) flush 5-40 mL  5-40 mL IntraVENous PRN  
 albuterol (PROVENTIL VENTOLIN) nebulizer solution 2.5 mg  2.5 mg Nebulization Q6H RT  
 
 
 
Objective:  
 
Vitals: 02/05/21 0630 02/05/21 8894 02/05/21 7092 02/05/21 0920 BP: (!) 121/59  (!) 154/70 (!) 156/81 Pulse: (!) 56 79 85 92 Resp:  (!) 31 Temp:      
SpO2: 96% 96% Weight:      
Height:      
 
Intake and Output:  
 
 
Physical Exam:         
Constitutional: the patient is well develop, elderly, appears comfortable HEENT:  pupils equal, oral mucosa moist 
Lungs: bilateral coarse sounds, tachypnea Cardiovascular: RRR without M,G,R 
Abd/GI: soft and non-tender; with positive bowel sounds. NGT with feedings infusing Ext: warm without cyanosis. There is no lower leg edema. Skin: no jaundice or rashes, wound to left wrist 
Neuro: withdraws from pain; no command following; does not open eyes to stimulus Lines/Drains/Airway: 
Quad lumen Left IJ 
NGT Trach XLT 6mm Nutrition:  
Tube feedings with [employer] CHEST XRAY:  
 
 
CT head: 1/21 IMPRESSION Impression: No CT evidence of acute intracranial abnormality. 
  
Chronic changes. LAB Recent Labs 02/04/21 2083 02/03/21 
8114 WBC 14.1* 12.7* HGB 9.0* 8.6* HCT 29.7* 28.6*  
 238 Recent Labs 02/04/21 
0684 02/03/21 
0712 * 147*  
K 5.2* 5.1  108* CO2 39* 38* * 120* * 121* CREA 1.87* 1.91* MG  --  2.6* PHOS  --  4.9* ABG:   
 
 
 
 
Assessment:  
 
Hospital Problems  Date Reviewed: 2/4/2021 Codes Class Noted POA Cardiac arrest [facility]) ICD-10-CM: I46.9 ICD-9-CM: 427.5  12/30/2020 No  
   
 COVID-19 ICD-10-CM: U07.1 ICD-9-CM: 079.89  12/24/2020 Yes * (Principal) Acute respiratory distress syndrome (ARDS) due to severe acute respiratory syndrome coronavirus 2 (SARS-CoV-2) ([facility]) ICD-10-CM: U07.1, J80 
ICD-9-CM: 518.82, 079.89  12/24/2020 No  
   
 Acute hypoxemic respiratory failure ([facility] [address] ICD-10-CM: J96.01 
ICD-9-CM: 518.81  12/24/2020 Yes Acute on chronic renal failure (HCC) ICD-10-CM: N17.9, N18.9 ICD-9-CM: 584.9, 585.9  9/17/2019 Yes Coronary artery disease involving coronary bypass graft of native heart without angina pectoris (Chronic) ICD-10-CM: O04.325 ICD-9-CM: 414.05  6/15/2015 Yes Uncontrolled type 2 diabetes mellitus with hyperglycemia (HCC) (Chronic) ICD-10-CM: E11.65 ICD-9-CM: 250.02  6/15/2015 Yes Diabetes mellitus with hyperosmolarity without hyperglycemic hyperosmolar nonketotic coma (HCC) (Chronic) ICD-10-CM: E11.00 ICD-9-CM: 250.20  11/24/2014 Yes PLAN:  
--wean FiO2, maintain vent for now 
--continue medications as currently ordered 
--continue precedex and fentanyl --pt is DNR 
--plans for comfort care on 2/6 at [facility] More than 50% of time documented was spent in face-to-face contact with the patient and in the care of the patient on the floor/unit where the patient is located. Lungs:  Trached, mild B rhonchi. Tachypneic. Heart:  RRR with no Murmur/Rubs/Gallops Additional Comments:   
Plan at present is to move to comfort care tomorrow afternoon. Continue current supportive measures in the meantime. Currently on trach and tube feeds which will likely be stopped with move to comfort tomorrow. I have spoken with and examined the patient. I agree with the above assessment and plan as documented.  
 
Bhavana Walker MD

## 2021-02-05 NOTE — PROGRESS NOTES
Patient has a #6 tracheostomy. Breath sounds are coarse. Trachea is midline and chest excursion is symmetric. Patient does have peripheral pulses. Patients skin is appropriate for ethnicity and cool to the touch. Patients is Positive for edema. Capillary refill is 3 seconds. Ventilator check was performed and all alarms are set and audible. Safety rails are up and resuscitation bag is at the head of bed. Ventilator Settings Mode FIO2 Rate Tidal Volume Pressure PEEP I:E Ratio VC+  40 %    0.32 ml  12 cm H2O  8 cm H20  1:2.5 Peak airway pressure: 27 cm H2O Minute ventilation: 10.4 l/min ABG: No results for input(s): PH, PCO2, PO2, HCO3 in the last 72 hours.

## 2021-02-06 NOTE — PROGRESS NOTES
notified care team that no end of life decisions can be made until both of the pt's daughters agree, or POA is provided.

## 2021-02-06 NOTE — PROGRESS NOTES
Stephan Birmingham Admission Date: 12/24/2020 Daily Progress Note: 2/6/2021 Pt is an 77 yo female admitted 12/24 with acute respiratory failure secondary to COVID 19.  Pt was treated with Decadron and convalescent plasma. She had PRATIMA and was treated with Remdesivir. Jadyn Pina was initially on BiPAP but pt had a  cardiac arrest on 12/30 requiring intubation.  She has remained vent dependent and a trach was placed on 1/19. Pt was treated for an E coli UTII as well as MRSA in her sputum and abx were completed. He developed acute on chronic renal failure and started on dialysis on 1/12 but held on 1/25 with increased urine output with improvement and nephrology has signed off. Eduardo Cable holding on PEG due to multiple medical problems. Pt was seen by Neurology with an abnormal EEG with moderate to severe slowing. Brain MRI 1/26 revealed stable age-related senescent changes and chronic microvascular disease with remote right posterior temporal occipital ischemic insult. No evidence anoxic brain injury per neuro - deep encephalopathy. Palliative Care following and family has decided on comfort care on 2/6 at 2pm.  
 
Subjective:  
Appears comfortable. Plans for compassionate extubation later today. Review of Systems Review of systems not obtained due to patient factors. Current Facility-Administered Medications Medication Dose Route Frequency  fentaNYL in normal saline (pf) 25 mcg/mL infusion  0-200 mcg/hr IntraVENous TITRATE  
 0.9% sodium chloride infusion 250 mL  250 mL IntraVENous PRN  
 furosemide (LASIX) injection 80 mg  80 mg IntraVENous DAILY  modafiniL (PROVIGIL) tablet 100 mg  100 mg Oral DAILY  dexmedeTOMidine in 0.9 % NaCl (PRECEDEX) 400 mcg/100 mL (4 mcg/mL) infusion soln  0.1-1.5 mcg/kg/hr IntraVENous TITRATE  LORazepam (ATIVAN) injection 2 mg  2 mg IntraVENous Q4H PRN  
 0.9% sodium chloride infusion 250 mL  250 mL IntraVENous PRN  
  fentaNYL citrate (PF) injection 50 mcg  50 mcg IntraVENous Q1H PRN  
 insulin glargine (LANTUS) injection 30 Units  30 Units SubCUTAneous QHS  hydrALAZINE (APRESOLINE) 20 mg/mL injection 10 mg  10 mg IntraVENous Q6H PRN  
 0.9% sodium chloride infusion 250 mL  250 mL IntraVENous PRN  
 famotidine (PEPCID) tablet 20 mg  20 mg Oral DAILY  insulin regular (NOVOLIN R, HUMULIN R) injection   SubCUTAneous BID  [Held by provider] guanFACINE IR (TENEX) tablet 1 mg  1 mg Oral BID  docusate (COLACE) 50 mg/5 mL oral liquid 100 mg  100 mg Per NG tube DAILY PRN  
 heparin (porcine) 1,000 unit/mL injection 5,000 Units  5,000 Units IntraVENous DIALYSIS PRN  
 alcohol 62% (NOZIN) nasal  1 Ampule  1 Ampule Topical Q12H  
 0.9% sodium chloride infusion 250 mL  250 mL IntraVENous PRN  
 heparin (porcine) injection 5,000 Units  5,000 Units SubCUTAneous Q8H  
 bisacodyL (DULCOLAX) suppository 10 mg  10 mg Rectal DAILY PRN  
 NUTRITIONAL SUPPORT ELECTROLYTE PRN ORDERS   Does Not Apply PRN  
 hydrALAZINE (APRESOLINE) tablet 10 mg  10 mg Per NG tube TID  dextrose 40% (GLUTOSE) oral gel 1 Tube  15 g Oral PRN  
 glucagon (GLUCAGEN) injection 1 mg  1 mg IntraMUSCular PRN  
 dextrose (D50W) injection syrg 12.5-25 g  25-50 mL IntraVENous PRN  
 albuterol (PROVENTIL HFA, VENTOLIN HFA, PROAIR HFA) inhaler 2 Puff  2 Puff Inhalation Q4H PRN  phenol throat spray (CHLORASEPTIC) 1 Spray  1 Spray Oral PRN  
 lip protectant (BLISTEX) ointment 1 Each  1 Each Topical PRN  
 acetaminophen (TYLENOL) tablet 650 mg  650 mg Oral Q6H PRN  
 sodium chloride (NS) flush 5-40 mL  5-40 mL IntraVENous Q8H  
 sodium chloride (NS) flush 5-40 mL  5-40 mL IntraVENous PRN  
 albuterol (PROVENTIL VENTOLIN) nebulizer solution 2.5 mg  2.5 mg Nebulization Q6H RT  
 
 
 
Objective:  
 
Vitals:  
 02/06/21 0744 02/06/21 0800 02/06/21 0829 02/06/21 0900 BP:  (!) 117/59 (!) 110/56 (!) 112/58 Pulse: 72 69 69 66 Resp: (!) 32     
 Temp:      
SpO2: 98% 95% 94% 93% Weight:      
Height:      
 
Intake and Output:  
 
 
Physical Exam:         
Constitutional: the patient is well develop, elderly, appears comfortable HEENT:  pupils equal, oral mucosa moist 
Lungs: bilateral coarse sounds Cardiovascular: RRR without M,G,R 
Abd/GI: soft and non-tender; with positive bowel sounds. NGT with feedings infusing Ext: warm without cyanosis. There is no lower leg edema. Skin: no jaundice or rashes, wound to left wrist 
Neuro: withdraws from pain; no command following; does not open eyes to stimulus Lines/Drains/Airway: 
Quad lumen Left IJ 
NGT Trach XLT 6mm 
coyle Nutrition:  
Tube feedings with nepro Drips:  
Fentanyl. precedex CHEST XRAY:  
02/06/2021 None today CT head: 1/21 IMPRESSION Impression: No CT evidence of acute intracranial abnormality. 
  
Chronic changes. LAB Recent Labs 02/04/21 
1807 WBC 14.1* HGB 9.0*  
HCT 29.7*  
 Recent Labs 02/04/21 
8655 *  
K 5.2*  
 CO2 39* * * CREA 1.87* ABG:   
 
 
 
 
Assessment:  
 
Hospital Problems  Date Reviewed: 2/5/2021 Codes Class Noted POA Cardiac arrest [facility]) ICD-10-CM: I46.9 ICD-9-CM: 427.5  12/30/2020 No  
   
 COVID-19 ICD-10-CM: U07.1 ICD-9-CM: 079.89  12/24/2020 Yes * (Principal) Acute respiratory distress syndrome (ARDS) due to severe acute respiratory syndrome coronavirus 2 (SARS-CoV-2) ([facility]) ICD-10-CM: U07.1, J80 
ICD-9-CM: 518.82, 079.89  12/24/2020 No  
   
 Acute hypoxemic respiratory failure ([facility] [address] ICD-10-CM: J96.01 
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  
--wean FiO2, maintain vent for now 
--continue medications as currently ordered 
--continue precedex and fentanyl --pt is DNR 
--plans for comfort care on 2/6 at [address], NP More than 50% of time documented was spent in face-to-face contact with the patient and in the care of the patient on the floor/unit where the patient is located. Lungs:  Trached. CTA B, no w/r/r Heart:  RRR with no Murmur/Rubs/Gallops Additional Comments:   
Patient more comfortable on fentanyl drip. Awaiting family arrival for transition to comfort care. I have spoken with and examined the patient. I agree with the above assessment and plan as documented.  
 
Francy Henley MD

## 2021-02-06 NOTE — PROGRESS NOTES
Ventilator check complete; patient has a #6 XLT tracheostomy. Patient is sedated. Patient is not able to follow commands. Breath sounds are coarse. Trachea is midline, Negative for subcutaneous air, and chest excursion is symmetric. Patient is also Negative for cyanosis and is Negative for pitting edema. All alarms are set and audible. Resuscitation bag is at the head of the bed. Ventilator Settings Mode FIO2 Rate Tidal Volume  PEEP I:E Ratio VC+  40 % 20 320 ml    8 cm H20  1:2.5 Peak airway pressure: 36 cm H2O Minute ventilation: 13.4 l/min Cecily Onofre RT

## 2021-02-06 NOTE — PROGRESS NOTES
Bedside and Verbal shift change report given to Jeremias Reddy RN (oncoming nurse) by Ronna Woods RN (offgoing nurse). Report included the following information SBAR, Kardex, ED Summary, OR Summary, Procedure Summary, Intake/Output, MAR, Accordion, Recent Results, Med Rec Status and Cardiac Rhythm Sinus Bill Burnett.

## 2021-02-06 NOTE — PROGRESS NOTES
Ventilator check complete; patient has a #6.0 XLT tracheostomy. Patient is sedated. Patient is not able to follow commands. Breath sounds are clear and coarse. Trachea is midline, Negative for subcutaneous air, and chest excursion is symmetric. Patient is also Negative for cyanosis and is Negative for pitting edema. All alarms are set and audible. Resuscitation bag is at the head of the bed. Ventilator Settings Mode FIO2 Rate Tidal Volume Pressure PEEP I:E Ratio VC+  40 %   20 0.32 ml    8 cm H20  1:2.33 Peak airway pressure: 26 cm H2O Minute ventilation: 11.4 l/min ABG: No results for input(s): PH, PCO2, PO2, HCO3 in the last 72 hours. Shah Route

## 2021-02-06 NOTE — PROGRESS NOTES
@ 1400 Pt's family came for end of life care. MD spoke to pt's  daughters about very poor prognosis. Both sisters where in agreement about letting the rest of the family come and see the pt before moving forward with comfort care. @2192 primary nurse was made aware of pt daughters change in wishes for her mother. @ 1650 Grand Yvette was informed that her sister no long wished to proceed with comfort care. Mrs. Dax Montalvo understood the situation ans was very appreciative of the staff and everyone helping her mother.

## 2021-02-07 PROBLEM — G93.40 ENCEPHALOPATHY: Status: ACTIVE | Noted: 2021-01-01

## 2021-02-07 NOTE — PROGRESS NOTES
Leaving nurse Joselin Bernal RN) gave verbal and written report given to oncoming nurse Jaiden Lu RN). No issues noted.

## 2021-02-07 NOTE — PROGRESS NOTES
Ventilator check complete; patient has a #6 XLT tracheostomy. Patient is sedated. Patient is not able to follow commands. Breath sounds are coarse. Trachea is midline, Negative for subcutaneous air, and chest excursion is symmetric. Patient is also Negative for cyanosis and is Negative for pitting edema. All alarms are set and audible. Resuscitation bag is at the head of the bed. Ventilator Settings Mode FIO2 Rate Tidal Volume Pressure PEEP I:E Ratio VC+  40 %   20 0.32 ml    8 cm H20  1:2.33 Peak airway pressure: 29 cm H2O Minute ventilation: 11.7 l/min ABG: No results for input(s): PH, PCO2, PO2, HCO3 in the last 72 hours. Nia Maldonado

## 2021-02-07 NOTE — PROGRESS NOTES
continuing to follow this patient and her family  
hopefully the family can come to a mutual agreement on care goals  
by the first of  the week Staff providing very compassionate care

## 2021-02-07 NOTE — PROGRESS NOTES
Anthony Yan Admission Date: 12/24/2020 Daily Progress Note: 2/7/2021 Pt is an 79 yo female admitted 12/24 with acute respiratory failure secondary to COVID 19.  Pt was treated with Decadron and convalescent plasma. She had PRATIMA and was treated with Remdesivir. Estela Wan was initially on BiPAP but pt had a  cardiac arrest on 12/30 requiring intubation.  She has remained vent dependent and a trach was placed on 1/19. Pt was treated for an E coli UTII as well as MRSA in her sputum and abx were completed. He developed acute on chronic renal failure and started on dialysis on 1/12 but held on 1/25 with increased urine output with improvement and nephrology has signed off. Lethea Panda holding on PEG due to multiple medical problems. Pt was seen by Neurology with an abnormal EEG with moderate to severe slowing. Brain MRI 1/26 revealed stable age-related senescent changes and chronic microvascular disease with remote right posterior temporal occipital ischemic insult. No evidence anoxic brain injury per neuro - deep encephalopathy. Palliative Care following and family has decided on comfort care on 2/6 at 2pm.  
 
Subjective:  
 Appears agitated in bed, moving arms spontaneously, but not tracking or following commands. RR in high 30s, so increased fentanyl drop to 200 mcg. Informed RN. Noted bloody secretions coming out of trach. Thick. Review of Systems Review of systems not obtained due to patient factors. Current Facility-Administered Medications Medication Dose Route Frequency  fentaNYL in normal saline (pf) 25 mcg/mL infusion  0-200 mcg/hr IntraVENous TITRATE  
 0.9% sodium chloride infusion 250 mL  250 mL IntraVENous PRN  
 furosemide (LASIX) injection 80 mg  80 mg IntraVENous DAILY  modafiniL (PROVIGIL) tablet 100 mg  100 mg Oral DAILY  dexmedeTOMidine in 0.9 % NaCl (PRECEDEX) 400 mcg/100 mL (4 mcg/mL) infusion soln  0.1-1.5 mcg/kg/hr IntraVENous TITRATE  LORazepam (ATIVAN) injection 2 mg  2 mg IntraVENous Q4H PRN  
 0.9% sodium chloride infusion 250 mL  250 mL IntraVENous PRN  
 fentaNYL citrate (PF) injection 50 mcg  50 mcg IntraVENous Q1H PRN  
 insulin glargine (LANTUS) injection 30 Units  30 Units SubCUTAneous QHS  hydrALAZINE (APRESOLINE) 20 mg/mL injection 10 mg  10 mg IntraVENous Q6H PRN  
 0.9% sodium chloride infusion 250 mL  250 mL IntraVENous PRN  
 famotidine (PEPCID) tablet 20 mg  20 mg Oral DAILY  insulin regular (NOVOLIN R, HUMULIN R) injection   SubCUTAneous BID  [Held by provider] guanFACINE IR (TENEX) tablet 1 mg  1 mg Oral BID  docusate (COLACE) 50 mg/5 mL oral liquid 100 mg  100 mg Per NG tube DAILY PRN  
 heparin (porcine) 1,000 unit/mL injection 5,000 Units  5,000 Units IntraVENous DIALYSIS PRN  
 alcohol 62% (NOZIN) nasal  1 Ampule  1 Ampule Topical Q12H  
 0.9% sodium chloride infusion 250 mL  250 mL IntraVENous PRN  
 heparin (porcine) injection 5,000 Units  5,000 Units SubCUTAneous Q8H  
 bisacodyL (DULCOLAX) suppository 10 mg  10 mg Rectal DAILY PRN  
 NUTRITIONAL SUPPORT ELECTROLYTE PRN ORDERS   Does Not Apply PRN  
 hydrALAZINE (APRESOLINE) tablet 10 mg  10 mg Per NG tube TID  dextrose 40% (GLUTOSE) oral gel 1 Tube  15 g Oral PRN  
 glucagon (GLUCAGEN) injection 1 mg  1 mg IntraMUSCular PRN  
 dextrose (D50W) injection syrg 12.5-25 g  25-50 mL IntraVENous PRN  
 albuterol (PROVENTIL HFA, VENTOLIN HFA, PROAIR HFA) inhaler 2 Puff  2 Puff Inhalation Q4H PRN  phenol throat spray (CHLORASEPTIC) 1 Spray  1 Spray Oral PRN  
 lip protectant (BLISTEX) ointment 1 Each  1 Each Topical PRN  
 acetaminophen (TYLENOL) tablet 650 mg  650 mg Oral Q6H PRN  
 sodium chloride (NS) flush 5-40 mL  5-40 mL IntraVENous Q8H  
 sodium chloride (NS) flush 5-40 mL  5-40 mL IntraVENous PRN  
 albuterol (PROVENTIL VENTOLIN) nebulizer solution 2.5 mg  2.5 mg Nebulization Q6H RT  
 
 
 
Objective:  
 
Vitals: 02/07/21 0715 02/07/21 1529 02/07/21 0730 02/07/21 0745 BP:      
Pulse: (!) 103 (!) 104 100 (!) 106 Resp:  (!) 35 Temp:      
SpO2: 94% 94% 95% 94% Weight:      
Height:      
 
Intake and Output:  
 
 
Physical Exam:         
Constitutional: the patient is well develop, elderly, appears comfortable HEENT:  pupils equal, oral mucosa moist 
Lungs: L coarse rhonchi, R very diminished, rhonchi 
Cardiovascular: RRR without M,G,R 
Abd/GI: soft and non-tender; with positive bowel sounds. NGT with feedings infusing Ext: warm without cyanosis. There is no lower leg edema. Skin: no jaundice or rashes, wound to left wrist 
Neuro: withdraws from pain; no command following; does not open eyes to stimulus Lines/Drains/Airway: 
Quad lumen Left IJ 
NGT Trach XLT 6mm 
coyle Nutrition:  
Tube feedings with nepro Drips:  
Fentanyl. precedex CHEST XRAY:  
02/07/2021 02/04/2021 CT head: 1/21 IMPRESSION Impression: No CT evidence of acute intracranial abnormality. 
  
Chronic changes. LAB Recent Labs 02/07/21 
0303 WBC 9.6 HGB 7.5* HCT 25.5*  
 Recent Labs 02/07/21 
0303 *  
K 4.6  CO2 43* * * CREA 1.71* ABG:   
 
 
 
 
Assessment:  
 
Hospital Problems  Date Reviewed: 2/5/2021 Codes Class Noted POA Cardiac arrest [facility]) ICD-10-CM: I46.9 ICD-9-CM: 427.5  12/30/2020 No  
   
 COVID-19 ICD-10-CM: U07.1 ICD-9-CM: 079.89  12/24/2020 Yes * (Principal) Acute respiratory distress syndrome (ARDS) due to severe acute respiratory syndrome coronavirus 2 (SARS-CoV-2) ([facility]) ICD-10-CM: U07.1, J80 
ICD-9-CM: 518.82, 079.89  12/24/2020 No  
   
 Acute hypoxemic respiratory failure ([facility] [address] ICD-10-CM: J96.01 
ICD-9-CM: 518.81  12/24/2020 Yes Acute on chronic renal failure (HCC) ICD-10-CM: N17.9, N18.9 ICD-9-CM: 584.9, 585.9  9/17/2019 Yes Coronary artery disease involving coronary bypass graft of native heart without angina pectoris (Chronic) ICD-10-CM: O68.897 ICD-9-CM: 414.05  6/15/2015 Yes Uncontrolled type 2 diabetes mellitus with hyperglycemia (HCC) (Chronic) ICD-10-CM: E11.65 ICD-9-CM: 250.02  6/15/2015 Yes Diabetes mellitus with hyperosmolarity without hyperglycemic hyperosmolar nonketotic coma (HCC) (Chronic) ICD-10-CM: E11.00 ICD-9-CM: 250.20  11/24/2014 Yes PLAN:  
--maintain vent support, did not wean r/t pt agitation  
--continue medications as currently ordered 
--continue precedex and fentanyl 
--hold heparin r/t bloody secretions from ETT 
--repeat CBC in AM, sooner if more active bleeding 
--increase sedation as needed, she appears agitated at this time. --pt is DNR 
--planned for comfort measures yesterday, but family rescinded comfort measures yesterday. Remains DNR. Will cont to work with family regarding pt poor prognosis. Isabela Velazco NP More than 50% of time documented was spent in face-to-face contact with the patient and in the care of the patient on the floor/unit where the patient is located. Lungs:  Trach, mild rhonchi. Heart:  RRR with no Murmur/Rubs/Gallops Additional Comments:   
Spoke extensively with patient's daughter yesterday. Seemed likely everyone was on the same page about comfort care. However, that changed some time after my interaction with her and not sure what the catalyst was for this. Will continue current supportive efforts. Holding heparin, cont vent support. Will need case management/palliative help again tomorrow to help determine direction/location for her ongoing care. Increase free water for hypernatremia. I have spoken with and examined the patient. I agree with the above assessment and plan as documented.  
 
Samy Patel MD

## 2021-02-08 NOTE — ETHICS
Clinical Ethics Consultation Note Received ethics consult request from [facility]. I reviewed the pt's chart and spoke with Lynnette Saenz and [facility] about the situation. Recommend holding a family meeting with both daughters, Syed Carr, Dr. Hira Manzanares and myself. Syed Carr will call both daughters and try to arrange the meeting for tomorrow, 2/9. Will continue to follow. Thank you for including ethics in this patient's care. Sharmaine Weiss, Director of [address] [place] [address] O

## 2021-02-08 NOTE — PROGRESS NOTES
Care Management Interventions PCP Verified by CM: Sim Adams MD) Mode of Transport at Discharge: Other (see comment)(To [employer]) Transition of Care Consult (CM Consult): Discharge Planning Discharge Durable Medical Equipment: No 
Physical Therapy Consult: No 
Occupational Therapy Consult: No 
Speech Therapy Consult: Yes Current Support Network: Own Home, Family Lives [facility] [phone]) Confirm Follow Up Transport: Family The Plan for Transition of Care is Related to the Following Treatment Goals : Undetermined The Patient and/or Patient Representative was Provided with a Choice of Provider and Agrees with the Discharge Plan?: Yes Name of the Patient Representative Who was Provided with a Choice of Provider and Agrees with the Discharge Plan: Patient's son, Iqra Kelly Freedom of Choice List was Provided with Basic Dialogue that Supports the Patient's Individualized Plan of Care/Goals, Treatment Preferences and Shares the Quality Data Associated with the Providers?: Yes [employer] Information Provided?: No 
Discharge Location Discharge Placement: Other: 
 
CM met with [facility] Fuentes Palliative Care NP today to discuss pt's status. CM informed pt's daughters, Andrew Finn and Dawna Stapleton, were unable to extubate pt this weekend due to Dawna Stapleton disagreeing with comfort care. CM is informed that Dawna Stapleton is currently living in pt's trailer. Andrew Cancer states she has been her mother's main caregiver and knows what she wants. CM will join with Palliative Care tomorrow at 1300 for family meeting.

## 2021-02-08 NOTE — PROGRESS NOTES
Anita Layne Admission Date: 12/24/2020 Daily Progress Note: 2/8/2021 Pt is an 79 yo female admitted 12/24 with acute respiratory failure secondary to COVID 19.  Pt was treated with Decadron and convalescent plasma. She had PRATIMA and was treated with Remdesivir. Alis Wheeler was initially on BiPAP but pt had a  cardiac arrest on 12/30 requiring intubation.  She has remained vent dependent and a trach was placed on 1/19. Pt was treated for an E coli UTII as well as MRSA in her sputum and abx were completed. He developed acute on chronic renal failure and started on dialysis on 1/12 but held on 1/25 with increased urine output with improvement and nephrology has signed off. Arlin Paniagua holding on PEG due to multiple medical problems. Pt was seen by Neurology with an abnormal EEG with moderate to severe slowing. Brain MRI 1/26 revealed stable age-related senescent changes and chronic microvascular disease with remote right posterior temporal occipital ischemic insult. No evidence anoxic brain injury per neuro - deep encephalopathy. Palliative Care following and family has decided on comfort care on 2/6 at 2pm.  
 
Subjective:  
 
Family decided to continue aggressive care. On vent and sedation for 46 days without progress. Review of Systems Review of systems not obtained due to patient factors. Current Facility-Administered Medications Medication Dose Route Frequency  fentaNYL in normal saline (pf) 25 mcg/mL infusion  0-200 mcg/hr IntraVENous TITRATE  
 0.9% sodium chloride infusion 250 mL  250 mL IntraVENous PRN  
 furosemide (LASIX) injection 80 mg  80 mg IntraVENous DAILY  modafiniL (PROVIGIL) tablet 100 mg  100 mg Oral DAILY  dexmedeTOMidine in 0.9 % NaCl (PRECEDEX) 400 mcg/100 mL (4 mcg/mL) infusion soln  0.1-1.5 mcg/kg/hr IntraVENous TITRATE  LORazepam (ATIVAN) injection 2 mg  2 mg IntraVENous Q4H PRN  
 0.9% sodium chloride infusion 250 mL  250 mL IntraVENous PRN  
  fentaNYL citrate (PF) injection 50 mcg  50 mcg IntraVENous Q1H PRN  
 insulin glargine (LANTUS) injection 30 Units  30 Units SubCUTAneous QHS  hydrALAZINE (APRESOLINE) 20 mg/mL injection 10 mg  10 mg IntraVENous Q6H PRN  
 0.9% sodium chloride infusion 250 mL  250 mL IntraVENous PRN  
 famotidine (PEPCID) tablet 20 mg  20 mg Oral DAILY  insulin regular (NOVOLIN R, HUMULIN R) injection   SubCUTAneous BID  [Held by provider] guanFACINE IR (TENEX) tablet 1 mg  1 mg Oral BID  docusate (COLACE) 50 mg/5 mL oral liquid 100 mg  100 mg Per NG tube DAILY PRN  
 heparin (porcine) 1,000 unit/mL injection 5,000 Units  5,000 Units IntraVENous DIALYSIS PRN  
 alcohol 62% (NOZIN) nasal  1 Ampule  1 Ampule Topical Q12H  
 0.9% sodium chloride infusion 250 mL  250 mL IntraVENous PRN  
 heparin (porcine) injection 5,000 Units  5,000 Units SubCUTAneous Q8H  
 bisacodyL (DULCOLAX) suppository 10 mg  10 mg Rectal DAILY PRN  
 NUTRITIONAL SUPPORT ELECTROLYTE PRN ORDERS   Does Not Apply PRN  
 hydrALAZINE (APRESOLINE) tablet 10 mg  10 mg Per NG tube TID  dextrose 40% (GLUTOSE) oral gel 1 Tube  15 g Oral PRN  
 glucagon (GLUCAGEN) injection 1 mg  1 mg IntraMUSCular PRN  
 dextrose (D50W) injection syrg 12.5-25 g  25-50 mL IntraVENous PRN  
 albuterol (PROVENTIL HFA, VENTOLIN HFA, PROAIR HFA) inhaler 2 Puff  2 Puff Inhalation Q4H PRN  phenol throat spray (CHLORASEPTIC) 1 Spray  1 Spray Oral PRN  
 lip protectant (BLISTEX) ointment 1 Each  1 Each Topical PRN  
 acetaminophen (TYLENOL) tablet 650 mg  650 mg Oral Q6H PRN  
 sodium chloride (NS) flush 5-40 mL  5-40 mL IntraVENous Q8H  
 sodium chloride (NS) flush 5-40 mL  5-40 mL IntraVENous PRN  
 albuterol (PROVENTIL VENTOLIN) nebulizer solution 2.5 mg  2.5 mg Nebulization Q6H RT  
 
 
 
Objective:  
 
Vitals:  
 02/08/21 0600 02/08/21 0630 02/08/21 0659 02/08/21 0729 BP: (!) 100/57  (!) 109/57 (!) 108/55 Pulse: 71  68 68 Resp: 25     
 Temp:      
SpO2: 99%  99% 99% Weight:  157 lb 3 oz (71.3 kg) Height:      
 
Intake and Output:  
 
 
Physical Exam:         
Constitutional: the patient is critically ill on vent HEENT: trach Lungs: coarse bilaterally on vent Cardiovascular: RRR without M,G,R 
Abd/GI: soft; with positive bowel sounds. Ext: warm without cyanosis. There is no lower leg edema. Skin: no jaundice or rashes, wound to left wrist 
Neuro: withdraws from pain; not following commands; does not open eyes to stimulus Lines/Drains/Airway: 
Quad lumen Left IJ 
NGT Trach XLT 6mm 
coyle Nutrition:  
Tube feedings Drips:  
Fentanyl. precedex CHEST XRAY:  
02/07/2021 02/04/2021 CT head: 1/21 IMPRESSION Impression: No CT evidence of acute intracranial abnormality. 
  
Chronic changes. LAB Recent Labs 02/08/21 
8529 02/07/21 
2419 WBC 15.8* 9.6 HGB 7.5* 7.5* HCT 25.6* 25.5*  
 172 Recent Labs 02/08/21 
4240 02/07/21 
1116 * 150*  
K 4.6 4.6 * 107 CO2 42* 43* * 190* * 123* CREA 1.78* 1.71* ABG:   
 
 
 
 
Assessment:  
 
Hospital Problems  Date Reviewed: 2/5/2021 Codes Class Noted POA Encephalopathy ICD-10-CM: G93.40 ICD-9-CM: 348.30  2/7/2021 Unknown Cardiac arrest [facility]) ICD-10-CM: I46.9 ICD-9-CM: 427.5  12/30/2020 No  
   
 COVID-19 ICD-10-CM: U07.1 ICD-9-CM: 079.89  12/24/2020 Yes * (Principal) Acute respiratory distress syndrome (ARDS) due to severe acute respiratory syndrome coronavirus 2 (SARS-CoV-2) ([facility]) ICD-10-CM: U07.1, J80 
ICD-9-CM: 518.82, 079.89  12/24/2020 No  
   
 Acute hypoxemic respiratory failure ([facility] [address] ICD-10-CM: J96.01 
ICD-9-CM: 518.81  12/24/2020 Yes Acute on chronic renal failure (HCC) ICD-10-CM: N17.9, N18.9 ICD-9-CM: 584.9, 585.9  9/17/2019 Yes  Coronary artery disease involving coronary bypass graft of native heart without angina pectoris (Chronic) ICD-10-CM: L95.115 
 ICD-9-CM: 414.05  6/15/2015 Yes Uncontrolled type 2 diabetes mellitus with hyperglycemia (HCC) (Chronic) ICD-10-CM: E11.65 ICD-9-CM: 250.02  6/15/2015 Yes Diabetes mellitus with hyperosmolarity without hyperglycemic hyperosmolar nonketotic coma (HCC) (Chronic) ICD-10-CM: E11.00 ICD-9-CM: 250.20  11/24/2014 Yes Here with COVID 19, acute respiratory failure and s/p cardiac arrest. On the vent since 12/30, no improvement. Ongoing deep encephalopathy and agitation, neurology has seen. PC following, ongoing discussions with family. PLAN:  
--maintain vent support, did not wean r/t pt agitation  
--continue precedex and fentanyl. increase sedation as needed, she appears agitated at this time. --pt is DNR 
--planned for comfort measures, but family rescinded comfort measures. Remains DNR. PC reconsulted to assist with family discussions regarding pt poor prognosis. Micah Phillips NP Lungs:  Trach, CTA B Heart:  RRR with no Murmur/Rubs/Gallops Additional Comments:   
Daughter Vivian Lopez present at bedside. Reviewed medical and social situation at present. Pt's other daughter speaking with palliative care. Will await outcome of these conversations as her overall prognosis has not changed. Will increase free water, cont vent support, wean sedatives when/if able. I have spoken with and examined the patient. I agree with the above assessment and plan as documented.  
 
Soren Madsen MD

## 2021-02-08 NOTE — PROGRESS NOTES
Palliative Care Progress Note Patient: Cesar Rodas MRN: [id number]  SSN: [id number] YOB: 1940  Age: 78 y.o. Sex: female Assessment/Plan: Chief Complaint/Interval History:  Remains unresponsive, ventilated via trach Principal Diagnosis:   
· Encephalopathy, Unspecified  G93.40 Additional Diagnoses: · Acute Respiratory Failure, Unspecified  J96.00 · Debility, Unspecified  R53.81 
· Frailty  R54 · Counseling, Encounter for Medical Advice  Z71.9 
· Encounter for Palliative Care  Z51.5 Palliative Performance Scale (PPS) Medical Decision Making:  
Reviewed and summarized notes over last week Discussed case with appropriate providers: RN Faith Escalera, Airam Crum, NP Steve Mojica Reviewed laboratory and x-ray data over last week Pt remains unresponsive, ventilated via trach. There have been no changes in her condition. She remains debilitated and frail. Comfort measures were to be started last Friday, however, the pt's other dtr, Jhonathan Havana, seems to have objected. Per Jl Cruz, who has been the pt's caregiver for many years, Jhonathan Havana is now living in the pt's home and does not want to leave. Jl Cruz plans to talk with a  about removing Jhonathan Havana from the medical decision-making process, as well as removing Jhonathan Havana from the pt's home. Per CHER Gutierrez this is day 46 on the vent. Jl Cruz said that she received a phone call on Saturday from the weekend CM, who said that the pt was to be discussed in an [address] [place] [address] today. Chaplain Hess's help in following up on this is greatly appreciated. Will continue to follow. Addendum: Received phone call from Diixe Conde, who recommended arranging a family meeting with the pt's two daughters. I have spoken to to both daughters, and both have agreed to meet at 1300. Deonna Espinal will lead the meeting, and ARAMIS Rojas will join us. Will discuss findings with members of the interdisciplinary team.   
 
  
More than 50% of this 35 minute visit was spent counseling and coordination of care as outlined above. In addition to the E&M described above, more than 50% of a 55-minute prolonged visit, from 0 - 1500, was spent on counseling and coordination of care. Subjective:  
 
Review of Systems: 
Review of systems not obtained due to patient factors- AMS Objective:  
 
Visit Vitals BP (!) 149/67 Pulse 86 Temp 97.6 °F (36.4 °C) Resp 25 Ht 5' 5\" (1.651 m) Wt 157 lb 3 oz (71.3 kg) SpO2 96% BMI 26.16 kg/m² Physical Exam: 
 
General:  Unresponsive. Debilitated and frail Eyes:  Conjunctivae/corneas clear Nose: Nares normal. Septum midline. Neck: Supple, symmetrical, trachea midline. Trach with vent Lungs:   Coarse bilaterally, unlabored Heart:  Regular rate and rhythm Abdomen:   Soft, non-tender, non-distended Extremities: Normal, atraumatic, no cyanosis or edema Skin: Skin color, texture, turgor normal  
Neurologic: Does not follow commands Psych: Unresponsive Signed By: Allyn Felton NP February 8, 2021

## 2021-02-09 NOTE — PROGRESS NOTES
Comprehensive Nutrition Assessment 
 
Type and Reason for Visit: Reassess 
Tube Feeding Management (Pulmonary) 
 
Nutrition Recommendations/Plan:  
? Continue EN of Nepro via NGT at 35 ml/hr.  
? Prosource TF x 1 packet per day with 50 ml water before and after. 
? Free water flush at 300 ml Q4 hrs per MD  
 
Malnutrition Assessment: 
Malnutrition Status: Insufficient data 
 
Nutrition Assessment:  
Nutrition History: 12/31: 3 recorded meals in past 6 days with average intake of 83%     
Nutrition Background: H/O: CAD, HTN, DM, HLD, peripheral neuropathy, CKD stage III. Presented with dyspnea. Findings of +COVID.Admitted to ICU with ARDS, severe acute hypoxic due to COVID PNA, PRATIMA on CKD.Was requiring BIPAP at night and Aviro during day. Had brief cardiac arrest and was intubated 12/30 
Daily Update: 
Patient seen and discussed with RN. Ethics meeting today noted. Patient was briefly made comfort measures but family rescinded shortly after. Free water flush has been increase over the last ~48 hrs due to hypernatremia. 
Abdominal Status (last documented): Intact abdomen with Active  bowel sounds. Last BM 02/07/21. 
Pertinent Medications: Dulcolax PRN, Colace PRN, pepcid, Fentanyl, Lantus, SSI 
Pertinent Labs: Na 155 (continues to trend up despite increasing free water), K 4.7, Glucose 152, BUN/Creatinine 116/1.84 
No edema 
 
Nutrition Related Findings:  
Intubated and OGT placed 12/30. CRRT initiated on 1/12. TF formula changed to a renal formula on 1/11.  s/p Trach 1/19, NG placed 1/19.  NGT replaced 1/29.   
 
Current Nutrition Therapies: 
DIET NPO 
DIET TUBE FEEDING Administer 1 pouches Prosource TF via open syringe into FT @ 1800 daily.  Flush FT with 50 ml water before and after administration of protein. 
Current Tube Feeding (TF) Orders:  
· Feeding Route: Nasogastric 
· Formula: Nepro 
· Schedule:Continuous   
· Regimen: 35 ml/hr 
 · Additives/Modulars: Protein(1 packet prosource TF per day with 50ml water bofore/after) · Water Flushes: 300 ml q 4 hrs · Current TF & Flush Orders Provides: at goal 
· Goal TF & Flush Orders Provides: 1552 kcal (100% needs), 79 g pro (100% needs), 2510 ml free water (~1.7 ml/kcal) Current Intake: Average Meal Intake: NPO Average Supplement Intake: NPO Additional Caloric Sources: ( ) Anthropometric Measures: 
Height: 5' 5\" (165.1 cm) Current Body Wt: 71.3 kg (157 lb 3 oz)(2/8), Weight source: Not specified BMI: 26.2, Overweight (BMI 25.0-29. 9) Admission Body Weight: 159 lb 2.8 oz(Bedscale) Ideal Body Wt: 125 lbs (57 kg), 123.8 % Usual Body Wt: 73.5 kg (162 lb)(Per EMR 11/5/82020), Percent weight change: -4.5 Estimated Daily Nutrient Needs: 
Energy (kcal/day): 8339-1761 (Kcal/kg(25-30), Weight Used: Ideal(56.8)) Protein (g/day): 68-86 (1.2-1.5 g/kg) Weight Used: (Ideal) Fluid (ml/day):   (Standard renal) Nutrition Diagnosis:  
· Inadequate oral intake related to impaired respiratory function as evidenced by (intubated, NPO, TF to meet needs) Nutrition Interventions:  
Food and/or Nutrient Delivery: Continue NPO, Continue tube feeding Coordination of Nutrition Care: Continue to monitor while inpatient Plan of Care discussed with Matthias Sampson Goals:  
Previous Goal Met: Goal(s) achieved Active Goal: Maintain TF to meet estimated needs Nutrition Monitoring and Evaluation:  
  
Food/Nutrient Intake Outcomes: Enteral nutrition intake/tolerance Physical Signs/Symptoms Outcomes: Biochemical data, GI status Discharge Planning: Too soon to determine [address] [place], LD on 2/9/2021 at 10:52 AM 
Contact: [phone] Disaster Mode active

## 2021-02-09 NOTE — PROGRESS NOTES
Care Management Interventions PCP Verified by CM: Reginald Interiano MD) Mode of Transport at Discharge: Other (see comment)(To [facility]) Transition of Care Consult (CM Consult): Discharge Planning Discharge Durable Medical Equipment: No 
Physical Therapy Consult: No 
Occupational Therapy Consult: No 
Speech Therapy Consult: Yes Current Support Network: Own Home, Family Lives [facility] [phone]) Confirm Follow Up Transport: Family The Plan for Transition of Care is Related to the Following Treatment Goals : Undetermined The Patient and/or Patient Representative was Provided with a Choice of Provider and Agrees with the Discharge Plan?: Yes Name of the Patient Representative Who was Provided with a Choice of Provider and Agrees with the Discharge Plan: Patient's son, General Chávez [place] of Choice List was Provided with Basic Dialogue that Supports the Patient's Individualized Plan of Care/Goals, Treatment Preferences and Shares the Quality Data Associated with the Providers?: Yes [employer] Information Provided?: No 
Discharge Location Discharge Placement: Other: ARAMIS, along with Azra Yuan from [facility], and Kaya Shine (Director of [address] [place] [address]) met with pt's two daughters; Carol Mckeon in the [address] [place] [address] area of the hospital. Kaya Shine led the discussion to assist the family to come to a decision regarding withdrawal from vent. Buck Aranda informed us of the detailed conversation she had with her mother prior to intubation. She states her mother was clear regarding her wishes after all efforts had been extinguished. She said she was at peace that she has seen all the effort and now she could remove the ventilator. [facility] voiced concern about \"not being ready\" for her mother to die. At the end of the conversation, she asked that her children be allowed to visit (5 in person, 1 by FT). She was unsure if she would like to be present when the ventilator was removed. Buck Aranda stated she would like person, that the pt called \"sister\" to be present. Orders for comfort measures will be placed tomorrow at 1600.

## 2021-02-09 NOTE — ETHICS
Clinical Ethics Consultation Note Met today at 1300 with both daughters, NP, and Care Mgmt. Conversation focused around accepting poor prognosis and what the patient would have wanted. Daughter Thai Nicholson claims to have spoken at length with her mother about EOL treatment, and states she would want to let go when it was time. Hodan Loyola came to understand, but wanted to make sure her children were able to visit their grandmother one more time to say goodbye. All agreed that Kelsy's children could take turns visiting this afternoon, and extubation will take place tomorrow at 1100. Respectfully submitted, 
 
Di Kumari Director of [employer]

## 2021-02-09 NOTE — PROGRESS NOTES
Ventilator check complete; patient has a #6.0 XLT-Proximal tracheostomy. Patient is sedated. Patient is not able to follow commands. Breath sounds are diminished. Trachea is midline, Negative for subcutaneous air, and chest excursion is symmetric. Patient is also Negative for cyanosis and is Negative for pitting edema. All alarms are set and audible. Resuscitation bag is at the head of the bed. Ventilator Settings Mode FIO2 Rate Tidal Volume Pressure PEEP I:E Ratio VC+  50 % 20  320 ml    8 cm H20  1:2.3 Peak airway pressure: 20 cm H2O Minute ventilation: 8.2 l/min Juan Nicholson, RT

## 2021-02-09 NOTE — PROGRESS NOTES
Ventilator check complete; patient has a #6.0 tracheostomy. .  Breath sounds are coarse and diminished. Trachea is midline, Negative for subcutaneous air, and chest excursion is symmetric. Patient is also Negative for cyanosis and is Negative for pitting edema. All alarms are set and audible. Resuscitation bag is at the head of the bed. Ventilator Settings Mode FIO2 Rate Tidal Volume Pressure PEEP I:E Ratio VC+  60 %    0.32 ml  12 cm H2O  8 cm H20  1:2.3 Peak airway pressure: 29 cm H2O Minute ventilation: 11.1 l/min ABG: No results for input(s): PH, PCO2, PO2, HCO3 in the last 72 hours.  
 
 
Adryan Lamar, RT

## 2021-02-09 NOTE — PROGRESS NOTES
Palliative Care Progress Note Patient: Anita Layne MRN: [id number]  SSN: [id number] YOB: 1940  Age: 78 y.o. Sex: female Assessment/Plan: Chief Complaint/Interval History:  Remains unresponsive, ventilated via trach Principal Diagnosis:   
· Encephalopathy, Unspecified  G93.40 Additional Diagnoses: · Acute Respiratory Failure, Unspecified  J96.00 
· Advance Care Planning Counseling Z71.89 
· Debility, Unspecified  R53.81 
· Frailty  R54 
· Encounter for Palliative Care  Z51.5 Palliative Performance Scale (PPS) Medical Decision Making:  
Reviewed and summarized notes over last week Discussed case with appropriate providers: RN Venecia Caruso, NP Alicia Newby, Director of [place]/Medical Ethicist Fan Márquez Reviewed laboratory and x-ray data over last week Pt remains unresponsive, ventilated via trach. There have been no changes in her condition. She is increasingly debilitated and frail. Family meeting held today at 56 with  Pt's daughters Jayashree Lua and Tavo Rainey, medical ethicist aFn Márquez, [address] [place] [address], and myself. The result of the meeting was that the several of Michael abreu would visit this afternoon; I spoke to the [employer], who put the pt on the end of life list, which allows multiple visitors. The dtrs agreed to Comfort Measures at 1100 tomorrow. Addendum: At 1535 this afternoon, the dtrs opted to change the Comfort Measures time to 1600 tomorrow, so that granddaughter [address] could be present. Both dtrs were in agreement. Will discuss findings with members of the interdisciplinary team.   
 
  
More than 50% of this 35 minute visit was spent counseling and coordination of care as outlined above. In addition to the E&M described above, a 40--minute ACP meeting was spent discussing the pt's wishes concerning end of life care. Subjective:  
 
Review of Systems: Review of systems not obtained due to patient factors- AMS Objective:  
 
Visit Vitals BP (!) 148/68 Pulse (!) 110 Temp 99.2 °F (37.3 °C) Resp (!) 34 Ht 5' 5\" (1.651 m) Wt 157 lb 3 oz (71.3 kg) SpO2 100% BMI 26.16 kg/m² Physical Exam: 
 
General:  Unresponsive. Debilitated and frail Eyes:  Conjunctivae/corneas clear Nose: Nares normal. Septum midline. Neck: Supple, symmetrical, trachea midline. Trach with vent Lungs:   Coarse bilaterally, unlabored Heart:  Regular rate and rhythm Abdomen:   Soft, non-tender, non-distended Extremities: Normal, atraumatic, no cyanosis or edema Skin: Skin color, texture, turgor normal  
Neurologic: Does not follow commands Psych: Unresponsive Signed By: Sylvester Rodríguez NP February 9, 2021

## 2021-02-09 NOTE — PROGRESS NOTES
Arlin Ayala Admission Date: 12/24/2020 Daily Progress Note: 2/9/2021 Pt is an 77 yo female admitted 12/24 with acute respiratory failure secondary to COVID 19.  Pt was treated with Decadron and convalescent plasma. She had PRATIMA and was treated with Remdesivir. Chace Salazar was initially on BiPAP but pt had a  cardiac arrest on 12/30 requiring intubation.  She has remained vent dependent and a trach was placed on 1/19. Pt was treated for an E coli UTII as well as MRSA in her sputum and abx were completed. He developed acute on chronic renal failure and started on dialysis on 1/12 but held on 1/25 with increased urine output with improvement and nephrology has signed off. Lilo Smithon holding on PEG due to multiple medical problems. Pt was seen by Neurology with an abnormal EEG with moderate to severe slowing. Brain MRI 1/26 revealed stable age-related senescent changes and chronic microvascular disease with remote right posterior temporal occipital ischemic insult. No evidence anoxic brain injury per neuro - deep encephalopathy. Palliative Care following and family has decided on comfort care on 2/6 at 2pm but family rescinded. Subjective: On vent and sedation for 47 days without progress. Ongoing conversations with family- planned today at 1 pm. Remains on high dose fentanyl and precedex for over a week. Ongoing hypernatremia, hgb 7.0 today Review of Systems Review of systems not obtained due to patient factors. Current Facility-Administered Medications Medication Dose Route Frequency  HYDROmorphone (PF) (DILAUDID) injection 1 mg  1 mg IntraVENous Q3H PRN  
 oxyCODONE IR (ROXICODONE) tablet 10 mg  10 mg Per NG tube Q6H  
 LORazepam (ATIVAN) injection 1 mg  1 mg IntraVENous Q2H PRN  
 fentaNYL in normal saline (pf) 25 mcg/mL infusion  0-200 mcg/hr IntraVENous TITRATE  
 0.9% sodium chloride infusion 250 mL  250 mL IntraVENous PRN  
  furosemide (LASIX) injection 80 mg  80 mg IntraVENous DAILY  modafiniL (PROVIGIL) tablet 100 mg  100 mg Oral DAILY  0.9% sodium chloride infusion 250 mL  250 mL IntraVENous PRN  
 fentaNYL citrate (PF) injection 50 mcg  50 mcg IntraVENous Q1H PRN  
 insulin glargine (LANTUS) injection 30 Units  30 Units SubCUTAneous QHS  hydrALAZINE (APRESOLINE) 20 mg/mL injection 10 mg  10 mg IntraVENous Q6H PRN  
 0.9% sodium chloride infusion 250 mL  250 mL IntraVENous PRN  
 famotidine (PEPCID) tablet 20 mg  20 mg Oral DAILY  insulin regular (NOVOLIN R, HUMULIN R) injection   SubCUTAneous BID  [Held by provider] guanFACINE IR (TENEX) tablet 1 mg  1 mg Oral BID  docusate (COLACE) 50 mg/5 mL oral liquid 100 mg  100 mg Per NG tube DAILY PRN  
 heparin (porcine) 1,000 unit/mL injection 5,000 Units  5,000 Units IntraVENous DIALYSIS PRN  
 alcohol 62% (NOZIN) nasal  1 Ampule  1 Ampule Topical Q12H  
 0.9% sodium chloride infusion 250 mL  250 mL IntraVENous PRN  
 heparin (porcine) injection 5,000 Units  5,000 Units SubCUTAneous Q8H  
 bisacodyL (DULCOLAX) suppository 10 mg  10 mg Rectal DAILY PRN  
 NUTRITIONAL SUPPORT ELECTROLYTE PRN ORDERS   Does Not Apply PRN  
 hydrALAZINE (APRESOLINE) tablet 10 mg  10 mg Per NG tube TID  dextrose 40% (GLUTOSE) oral gel 1 Tube  15 g Oral PRN  
 glucagon (GLUCAGEN) injection 1 mg  1 mg IntraMUSCular PRN  
 dextrose (D50W) injection syrg 12.5-25 g  25-50 mL IntraVENous PRN  
 albuterol (PROVENTIL HFA, VENTOLIN HFA, PROAIR HFA) inhaler 2 Puff  2 Puff Inhalation Q4H PRN  phenol throat spray (CHLORASEPTIC) 1 Spray  1 Spray Oral PRN  
 lip protectant (BLISTEX) ointment 1 Each  1 Each Topical PRN  
 acetaminophen (TYLENOL) tablet 650 mg  650 mg Oral Q6H PRN  
 sodium chloride (NS) flush 5-40 mL  5-40 mL IntraVENous Q8H  
 sodium chloride (NS) flush 5-40 mL  5-40 mL IntraVENous PRN  
  albuterol (PROVENTIL VENTOLIN) nebulizer solution 2.5 mg  2.5 mg Nebulization Q6H RT  
 
 
 
Objective:  
 
Vitals:  
 02/09/21 1665 02/09/21 5582 02/09/21 5691 02/09/21 0813 BP: (!) 143/96  (!) 151/70 Pulse: 100  100 (!) 101 Resp: (!) 38   28 Temp:  98.6 °F (37 °C) SpO2: 97%  99% 99% Weight:      
Height:      
 
Intake and Output:  
 
 
Physical Exam:         
Constitutional: the patient is critically ill on vent HEENT: trach Lungs: coarse bilaterally on vent Cardiovascular: RRR without M,G,R 
Abd/GI: soft; with positive bowel sounds. Ext: warm without cyanosis. There is no lower leg edema. Skin: no jaundice or rashes, wound to left wrist 
Neuro:not following commands; restless, does not track Lines/Drains/Airway: 
Quad lumen Left IJ 
NGT Trach XLT 6mm 
coyle Nutrition:  
Tube feedings Drips:  
Fentanyl. precedex CHEST XRAY:  
02/07/2021 02/04/2021 CT head: 1/21 IMPRESSION Impression: No CT evidence of acute intracranial abnormality. 
  
Chronic changes. LAB Recent Labs 02/09/21 
1931 02/08/21 
4683 02/07/21 
5041 WBC 14.0* 15.8* 9.6 HGB 7.0* 7.5* 7.5* HCT 24.0* 25.6* 25.5*  
 180 172 Recent Labs 02/09/21 
4101 02/08/21 
8780 02/07/21 
9584 * 153* 150*  
K 4.7 4.6 4.6 * 109* 107 CO2 45* 42* 43* * 151* 190* * 115* 123* CREA 1.84* 1.78* 1.71* ABG:   
Lab Results Component Value Date/Time PHI 7.42 02/09/2021 04:22 AM  
 PCO2I 72.3 (HH) 02/09/2021 04:22 AM  
 PO2I 57 (L) 02/09/2021 04:22 AM  
 HCO3I 46.9 (H) 02/09/2021 04:22 AM  
 FIO2I 50 02/09/2021 04:22 AM  
 
 
 
Assessment:  
 
Hospital Problems  Date Reviewed: 2/5/2021 Codes Class Noted POA Encephalopathy ICD-10-CM: G93.40 ICD-9-CM: 348.30  2/7/2021 Unknown Cardiac arrest [facility]) ICD-10-CM: I46.9 ICD-9-CM: 427.5  12/30/2020 No  
   
 COVID-19 ICD-10-CM: U07.1 ICD-9-CM: 079.89  12/24/2020 Yes * (Principal) Acute respiratory distress syndrome (ARDS) due to severe acute respiratory syndrome coronavirus 2 (SARS-CoV-2) (formerly [facility]) ICD-10-CM: U07.1, J80 
ICD-9-CM: 518.82, 079.89  12/24/2020 No  
   
 Acute hypoxemic respiratory failure ([facility] [address] ICD-10-CM: J96.01 
ICD-9-CM: 518.81  12/24/2020 Yes Acute on chronic renal failure (formerly [facility]) ICD-10-CM: N17.9, N18.9 ICD-9-CM: 584.9, 585.9  9/17/2019 Yes Coronary artery disease involving coronary bypass graft of native heart without angina pectoris (Chronic) ICD-10-CM: F85.693 ICD-9-CM: 414.05  6/15/2015 Yes Uncontrolled type 2 diabetes mellitus with hyperglycemia (formerly [facility]) (Chronic) ICD-10-CM: E11.65 ICD-9-CM: 250.02  6/15/2015 Yes Diabetes mellitus with hyperosmolarity without hyperglycemic hyperosmolar nonketotic coma (formerly [facility]) (Chronic) ICD-10-CM: E11.00 ICD-9-CM: 250.20  11/24/2014 Yes Here with COVID 19, acute respiratory failure and s/p cardiac arrest. On the vent since 12/30, no improvement. Ongoing deep encephalopathy and agitation, neurology has seen. PC following, ongoing discussions with family. Pepcid, heparin SQ 
 
PLAN:  
--maintain vent support, did not wean r/t pt agitation  
--stop precedex as it likely has little benefit at this point -- change to scheduled oxycodone IR and wean fentanyl. --pt is DNR and --planned for comfort measures, but family rescinded comfort measures. Remains DNR. PC reconsulted to assist with family discussions regarding pt poor prognosis. Plans to meet today at 1 pm. 
-- watch hgb, transfuse < 7 
-- lasix 80 mg daily- stop today, consider changing to diamox -- increase free water, looks to be ~ 3.8 l free water deficit -- pending conversations- compassionate care vs PEG/LTAC Hank Flores, [facility] Lungs:  Trached, b rhonchi. Heart:  RRR with no Murmur/Rubs/Gallops Additional Comments: Patient remains on vent. Not making any significant progress. Mental status not improving. Prognosis very poor at this point. Family not coming together on a consistent plan for goals. Await additional family meeting today to see if any progress on this front can be made. If not, need to consider next locations for her care such as LTACH and PEG placement. Hold all diuretics for now. Not volume overloaded and hypernatremia becoming more of an issue. Free water increased. Monitor response. I have spoken with and examined the patient. I agree with the above assessment and plan as documented.  
 
Pan Beltran MD

## 2021-02-10 NOTE — PROGRESS NOTES
A follow up visit was made to the patient. Emotional support, spiritual presence and  
prayer were provided for the patient. I provided some assistance to the patient's granddaughter who had questions about her grandmothers condition. I guided her to the patent's nurse, contacted the Nurse Supervisor and Dr. Vahid Gutierrez. Sandie Schmitz, [address] [facility], [facility]

## 2021-02-10 NOTE — PROGRESS NOTES
Ventilator check complete; patient has a #6.0 XLT-Proximal tracheostomy. Patient is not sedated. Patient is not able to follow commands. Breath sounds are coarse and diminished. Trachea is midline, Negative for subcutaneous air, and chest excursion is symmetric. Patient is also Negative for cyanosis and is Negative for pitting edema. All alarms are set and audible. Resuscitation bag is at the head of the bed. Ventilator Settings Mode FIO2 Rate Tidal Volume Pressure PEEP I:E Ratio VC+  50 % 20  320 ml    8 cm H20  1:2.3 Peak airway pressure: 32 cm H2O Minute ventilation: 9.59 l/min Abril Fritz, RT

## 2021-02-10 NOTE — PROGRESS NOTES
Patient plan of care changed. CMO 2/10 at 11 AM planned. Heart rate elevated at this time. Told MD, telephone order to D/C new scheduled PO Oxycodone and PRN Ativan/Dilaudid and restart Precedex and Fentanyl and titrate as needed.

## 2021-02-10 NOTE — PROGRESS NOTES
Ventilator check complete; patient has a #6 tracheostomy. Patient is sedated. Patient is not able to follow commands. Breath sounds are diminished. Trachea is midline, Negative for subcutaneous air, and chest excursion is symmetric. Patient is also Negative for cyanosis. All alarms are set and audible. Resuscitation bag is at the head of the bed. Ventilator Settings Mode FIO2 Rate Tidal Volume Pressure PEEP I:E Ratio VC+  50 %   20 320 ml    8 cm H20  1:2.3 Peak airway pressure: 24 cm H2O Minute ventilation: 7.53 l/min ABG: No results for input(s): PH, PCO2, PO2, HCO3 in the last 72 hours.  
 
 
[facility] Rack, RT

## 2021-02-10 NOTE — PROGRESS NOTES
Patient went into rapid afib rhythm. Blood pressure tolerated rate/rhythm change. Notified MD, gave 5 mg Lopressor. Responded appropriately.

## 2021-02-10 NOTE — ROUTINE PROCESS
Respiratory Care Services Policy Number: [id number] Title: Oxygen Protocol Effective Date: 01/1996 Revised Date: 06/2013, 02/29/2016, 4/2018, 7/2019 Reviewed Date: 05/2014, 03/2015, 06/2017, 11/2020 I. Policy: The Oxygen Protocol will be initiated for all patients upon written order from physician for administration of oxygen therapy or if a patient is found to have an oxygen saturation of 88% or less. Special consideration: the goal of oxygen therapy for COPD patients is to maintain oxygen saturation between 88% - 92% to comply with GOLD Guidelines. II. Purpose: To provide protocol driven respiratory therapy for the administration of oxygen at concentrations greater than that in ambient air with the intent of treating or preventing the symptoms and manifestations of hypoxia. III. Responsibility: Director Respiratory Care Services, all Respiratory Care Practitioners IV. Indications: A. Implement this protocol for patients when physician orders oxygen to be administered or when patient is found to have an oxygen saturation of 88% or less. B. To assure routine monitoring of patient's oxygen saturation b.i.d. and to make appropriate adjustments in accordance with ordered oxygen saturation parameters. C. To assure continuity of respiratory care that meets [facility] Clinical Practice Guidelines and GOLD Guidelines. D. Hb < 8 
E. Sickle Cell anemia crisis V. Assessment:  Assess the following parameters to determine the need to adjust oxygen: A. Measurement of patient's oxygen saturation via pulse oximetry. B. Observation of patient's color, respiratory effort, and responsiveness. C. Measurement of heart rate and respiratory rate. D. Complete a three-step ambulatory oxygen saturation when ordered. VI. Initiation:  Upon receipt of an order for oxygen, the RCP will: A. Verify order in the patient's EMR, which should include the desired oxygen saturation to be maintained. B. The patient shall be placed on oxygen with humidity (except for those oxygen delivery devices that do not require humidity, i.e. venturi masks and non-rebreather masks) as ordered by the physician to achieve the prescribed oxygen saturation. C. In the event that no saturation is specified, a saturation of 90% will be maintained. D. Patients, who are found to have a SaO2 of 88% or less, may be started on supplemental oxygen as described above. E. Patients admitted with cardiac problems/disease shall be maintained at 92% per Chest Committee recommendation. F. The patient will be informed of the \"no smoking policy\" and instructed in the proper use of oxygen therapy. G. Once desired oxygen saturation has been achieved, the RCP will document FIO2 and oxygen saturation in the respiratory section of the patient's EMR. VII. Maintenance: A. 30-second oxygen saturation check will be taken to maintain the saturation ordered by the physician each day. B. Patients will be assessed each shift and as needed by pulse oximetry to determine if oxygen needs to be decreased, increased or discontinued. C. If changes in FIO2 are indicated, all changes will be documented in the respiratory section of the patient's EMR. D. If no changes in FIO2 are required, the patient's oxygen flow rate and saturation will be recorded in the respiratory section of the patient's EMR. E. Per [facility], patients who are receiving oxygen therapy but are not on oxygen at home, should be weaned off oxygen as soon as possible or when anticipated discharge becomes evident. Gaylia Dennis will be discontinued after oxygen saturation has been maintained for 24 hours on room air and documented in the patient's EMR. G. Patients on the Inpatient Rehabilitation area on 9th floor will be exempt from having their oxygen discontinued per protocol. Oxygen may be weaned but will be changed to prn to meet the needs of the patient when exercising and participating in therapy. H. The goal of oxygen therapy is to maintain patients with a diagnosis of COPD at oxygen saturation between 88% - 92% to comply with GOLD Guidelines. VIII. Safety: RCP will address the following safety issues: A. Identify patient using the two patient identifiers name and birth date via ID bracelet. B. Perform hand hygiene per hospital policy utilizing Standard Precautions for all patients and following transmission-based isolation as indicated per hospital policy. C. Cardiac patients will be maintained at an oxygen saturation of 92%. D. If a patient's FIO2 requirements necessitate changing oxygen delivery devices to a high concentration of oxygen, documentation indicating the change must be included in the progress notes, as well as in the respiratory flowsheet. E. If a patient has a hemoglobin level <8 mg. RCP will consult physician before discontinuing oxygen. IX. Interventions: A. RCP will assess patient for signs of respiratory distress or suspicion of CO2 retention. B. An ABG may be obtained for patients exhibiting respiratory distress or sickle cell crisis. C. An order should be entered into patient's EMR for ABG under per protocol. X. Documentation A. Document assessment findings in the respiratory section of the patient's EMR. B. Document changes in therapy per protocol in the respiratory orders section and in the care plan section of the patient's EMR. C. Document patient education in the patient education section of the patient's EMR. XI. Reportable Conditions:  Report to the physician immediately: A. Acute changes in patient's respiratory status. B. An oxygen saturation <85%. C. A change in oxygen delivery device to provide a high concentration of oxygen. XII. Patient Instructions: Review with Patient A. Purpose of oxygen therapy B. Proper technique for using oxygen C. No smoking policy Approval: Pulmonary Committee (1-25-96) Revision: Chest Committee (4-28-05) L - Respiratory Care Department Policy, Procedure and Protocol Guideline Manual, 1995, KAITLIN Perez. L - Therapist Driven Respiratory Care Protocols  A Practitioner's Guide for Criteria-Based Respiratory Care by Wicho Bradford M.D., and KAITLIN Leo RRT. L - The rationale for therapist-driven protocols: an update. Respiratory Care 1998. N  [facility] Clinical Practice Guidelines. Respiratory Care Services Policy Number: [id number] Title: Patient Care Assessment Protocol Effective Date: 01/1999 Revised Date: 05/2014, 04/2018, 12/2018, 07/2019 Reviewed Date: 06/2013/ 03/2015, 03/2016, 06/2017, 11/2020 Overview In an effort to improve quality and reduce costs of respiratory care at [facility], the Respiratory Department has developed a number of Patient Care Protocols. These protocols have been developed according to Marquise 3 and are utilized for those patients who are ordered respiratory therapy using therapeutic indications and standardized approaches for accomplishing objectives. Patient Care Protocols are intended to improve care by: ? Defining the indications and standards of care agreed upon by the Pulmonary Medicine and [facility] CASIMIRO Riddle of [facility]. ? Training respiratory care practitioners to apply those criteria to individual patients and modify therapy as indicated by the protocols. ? Documenting the indication and care plan as part of the initial ordering process. ? Tapering or discontinuing treatments once the indication for therapy changes. The Patient Care Protocols shall be universally applied throughout the hospital as determined by  
the Pulmonary Medicine and [facility]. Rationale for Patient Care Assessment Protocols: 
? Continuous Quality Improvement ? Cost containment ? Standardization of care 
? Enhanced continuity of care ? Utilization review ? Timely intervention The following patient care assessment protocols have been developed: ? Aerosolized Medication Protocol ? Bronchial Hygiene Protocol ? Oxygen Protocol ? CVRU Fast Track Weaning Protocol ? Asthma Treatment Protocol ER ? Pediatric Asthma Treatment Protocol ER ? Alpha-1 Antitrypsin Deficiency Protocol ? Prone Positioning Protocol ? COPD Protocol ? Home Oxygen Assessment Protocol ? Ventilator Weaning Protocol ? Lung Volume Expansion Protocol The Director of [employer] Riddle oversees the Patient Care Assessment Program. The Respiratory Educator is responsible for protocol development and training. The Supervisor is responsible for implementation and  activities. Each patient with an order for respiratory treatments will receive an evaluation. Respiratory Care Practitioners (RCP's) will perform the evaluations. The same evaluation tool will be utilized for initial and follow-up assessments. If the patient does not meet criteria for ordered therapy, the therapy will be discontinued. If the patient demonstrates an adverse response to initially ordered therapy, the therapy will be discontinued and the physician will be contacted. Specific physician's orders that deviate from protocols and are deemed \"inappropriate\" or \"unsafe\" will be addressed with ordering physician and/or medical director as required. Respiratory Patient Care Assessment Protocols I.  Policy: In an effort to provide quality patient care and effective utilization of services, physicians who order respiratory therapy will have their patients treated via the protocols established (see attached) Respiratory Care Practitioners (RCP's) will complete the initial assessment which will indicate patient needs,  the care plan developed and will performed within 24 hours of admission. Frequency of the therapy will be set according to the results of the respiratory therapy evaluation and frequency guidelines policy. Reassessment will be continued every 48 hours and more frequently as needed for the individual patient. II. Purpose: F. To provide a process that will allow for ongoing assessment and care plan modification for patients receiving respiratory services based on both objective and or subjective patient responses to interventions. This process of protocol utilization will assist in patient care progression while eliminating the need for the physician to continually update respiratory therapy orders. G. To assure continuity of respiratory care that meets [facility] Clinical Practice Guidelines. III. Initiation:  Implement Respiratory Care Protocols for patients who are ordered by physician  
       to receive respiratory therapy procedures or for ventilator management. IV. Protocol: 
E. Upon receiving an order for therapy the RCP will review the patient's EMR (electronic medical record) for all pertinent information includin. Physician's order for therapy 2. Patient history and physical examination 3. Physician progress notes 4. Diagnostic. X-rays, PFT's, arterial blood gases etc. 
F. The RCP will perform a respiratory assessment in the following manner: 1. General observations: color, pattern and effort of breathing, chest expansion, (symmetrical and bilateral), level of consciousness and the ability to ambulate. 2. The RCP will assess patient's cough ability and determine if bronchial hygiene is needed. If patient is unable to produce sputum, at that time, the RCP should question the patient with regard to their sputum: production, color consistency, frequency and amount. 3. Auscultation: Using a stethoscope, the RCP will listen and note quality of breath sounds and presence or absence of adventitious breath sounds in all lung fields, both anteriorly and posteriorly. 4. Upon completing the EMR review and physical assessment, the RCP will document findings in the RT Assessment section of the EMR. The score level will be provided and will be used to determine the frequency of therapy. V. Indications: A. Bronchial Hygiene Protocol indications: 1. Potential for or presence of atelectasis. 1. Need for hydration and removal of retained secretions. 1. Need for improvement of cough effectiveness. 1. Presence of conditions associated with disorder of pulmonary clearance: 
a. Cystic fibrosis b. Bronchiectasis 
c. Neuromuscular disease 
d. Obstructive lung diseases 
e. Restrictive lung diseases Aerosolized Medication(s) Protocol indications:Treatment of bronchospasm/wheezing 2. Improvement of mucociliary clearance 3. Treatment of stridor 4. History of Bronchiectasis, Asthma or COPD 
C. Oxygen Therapy Protocol indications: 1. Documented hypoxemia 2. Severe trauma 3. Acute myocardial infarction 4. Short-term therapy (e.g. post anesthesia recovery) D. CVRU Ventilator Weaning Protocol indications: 1. All mechanically ventilated surgical patients unless they have a no wean order. E. Asthma Treatment Protocol ER indications: 1. Patients 15years of age and older that have been triaged or diagnosed with   asthma exacerbation shall be indicated for the ER Asthma Treatment Protocol. A physician order will be required to initiate the protocol. F. Pediatric Asthma protocol in the ER indications: 1. Patients less than 15years old that have been triaged or diagnosed with asthma exacerbation shall be indicated for the Pediatric Asthma protocol. A physician order will be required to initiate the protocol. G. Alpha-1 Antitrypsin Deficiency Testing protocol indications: 1. Patients admitted and diagnosed with COPD. H. Prone Positioning Protocol indications: 
       1. Acute lung injury 2. Acute respiratory distress syndrome (ARDS) I. Respiratory Care COPD Protocol indications: 1. History of COPD in patient's records 2. Smoking history J. Home Oxygen Assessment Protocol indications: 1. Chronic lung disease 2. Cor pulmonale 3. Unable to wean to room air 48 hours prior to anticipated discharge. K.  Ventilator Weaning Protocol indications: 1. Patient's mechanically ventilated 2. Managed by intensivist 
L. Lung Volume Expansion Protocol indications: 
      1. Any patient at risk for pulmonary complications. VI. Maintenance: H. Timely patient assessment is an integral part of this protocol therefore the following will be applied: 1. All non- critical care patients will be evaluated upon receiving initial respiratory care orders within 24 hours and re-evaluated within 48 hours (or more as needed). 2. Orders requesting a Respiratory Consult will be responded to in the following manner: 
a. In patient emergency situations, the RCP assigned to the floor will respond immediately to the patient, provide an initial respiratory assessment, and contact the patient's physician as necessary for appropriate orders. b. In non-emergent situations, the RCP assigned to the floor will respond to the patient within 90 minutes and provide an initial respiratory assessment and contact patient's physician as necessary for appropriate orders. c. An RCP will provide a comprehensive assessment as soon as possible. 3. Upon completion of an evaluation, the RCP will complete documentation in the patient's EMR in the RT Assessment section. 4. The RCP who completes the assessment will document orders for therapy in the orders section of the patient's EMR selecting new order. Next, per protocol should be selected indicating it is a protocol order and sign orders should be selected to complete the process. The applicable protocol must be added to the progress note per Joint Commission guidelines. 5. The Pharmacy and Therapeutics (P&T) Committee has mandated that the medication Xopenex may be changed to unit dose albuterol without an order, except for those patients receiving Xopenex due to cardiac arrhythmias. 1. The dosage for these patients should be 0.63 mg. and may be changed from 1.25 mg. to 0.63 mg per P & T Committee by the RCP completing the assessment. 6. Patients who are not experiencing cardiac arrhythmias, and are ordered Xopenex and Atrovent may be changed to Duoneb. VII. Safety:       
I. The following safety issues shall be monitored: 1. The RCP will perform hand hygiene per hospital policy utilizing Standard Precautions for all patients and following transmission-based isolation as indicated per hospital policy. 2. The RCP must exercise professional judgment in classifying the patient for frequency of therapy. 3. Appropriate classification of the patient will require an evaluation utilizing the Therapy Assessment Protocol Guidelines. 4. The RCP will confer with the physician concerning the care of the patient at any time questions or problems arise. 5. If during therapy, the patient exhibits no improvement, or deterioration in clinical status the RCP will notify the physician and the patient's nurse. VIII. Interventions:  
F. The patient's nurse is responsible concerning all items related to his/her care. Ongoing communication with nursing is essential to successful protocol management. G. The RCP recognizes the value of the team approach in meeting the patient's needs. Nursing input regarding the patient's pulmonary condition will be sought as needed. IX. Reportable conditions: The RCP will inform the physician if: 1. There are acute changes in patient's respiratory status. 2. The therapist is unable to determine appropriate care plan upon assessment. 3. The patient fails to reach therapeutic objective. 4. A change or additional medication is needed. X.  Patient Education:   
D. Patient will receive instruction on the followin. The treatment modality, including objectives and proper technique of therapy 2. Respiratory medications E. Documentation shall occur in the patient education section of the patient's EMR. XI. Documentation: Record all findings as described above in the patient's EMR. Related Protocols: A. Aerosolized Medication Protocol B. Bronchial Hygiene C. Oxygen Protocol D. CVRU Fast Track Weaning Protocol E. Asthma Treatment protocol ER 
F. Alpha-1 antitrypsin Deficiency Protocol G. Prone Positioning Protocol H. Respiratory Care COPD Protocol I. Home Oxygen assessment Protocol J. Ventilator Weaning Protocols K. Volume Expansion protocol Indications      Frequency          Level A. Aerosol therapy 1.  q4h     Severe SOB, wheezing, unable to sleep 1 2.  qid, q4 wa or q6h   Moderate SOB, wheezing   2 3.  tid      Hx of asthma, or COPD mild wheezing,  
      or facilitate secretion removal              3 
 4.  bid      Asthma, or COPD, Intermittent wheezing 4 5. PRN, i.e. tid PRN, qid PRN Asthma, or COPD, occasional wheezing 5 B. Bronchopulmonary Hygiene 1. q4h             Copious secretions, SOB, unable to sleep 1 2. qid & PRN            Moderate amounts of secretions   2  3. tid           Small amounts of secretions and poor cough,   
           history of secretions    3  
 4. PRN, i.e. tid PRN, qid PRN     Breathing exercises, encourage cough only 4  
  
C. Oxygen Therapy     Follow hospital approved Oxygen Protocol Note: 
qid treatments are due 0800, 1200, 1600, and 2000. tid treatments are due 0800, 1400, and 2000 Q6h treatments are due 0800, 1400, 2000, 0200 Q4 wa teatments are due 0800, 1200, 1600, and 2000. Q4h treatments are due  0800, 1200, 1600, 2000, 0000, and 0400. The Level 1-5 rating system is only to be used as criteria for determining appropriate frequency of therapy. References:  
[address] Standard L    Respiratory Care Department Policy, Procedure and Protocol Guideline Manual, 1995, KAITLIN Perez. L  Therapist Driven Respiratory Care Protocols  A Practitioner's Guide for Criteria-Based Respiratory Care by Amirah Olmstead M.D., and KAITLIN Jensen RRT. L  The rationale for therapist-driven protocols: an update. Respiratory Care 1998; 05:759-539 L Therapist Driven Respiratory Care Protocols  A Practitioner's Guide for Criteria-Based Respiratory Care by Amirah Olmstead M.D., and KAITLIN Jensen RRT. L The rationale for therapist-driven protocols: an update. Respiratory Care 1998; [id number]. N   [facility] Clinical Practice Guidelines. D. The RCP will perform a respiratory assessment in the following manner: 1. Perform hand hygiene per hospital policy utilizing Standard Precautions for all patients and following transmission-based isolation as indicated per policy. 2. Identify patient via ID bracelet verifying patient name and birth date. 3. General observations: color, pattern and effort of breathing, chest expansion, (symmetrical and bilateral), level of consciousness and the ability to ambulate. 4. The RCP will assess patients cough ability and determine if Nasotracheal suctioning is needed. If patient is unable to produce sputum, at that time, the RCP should question the patient with regard to their sputum: production, color consistency, frequency and amount. 5. Auscultation: Using a stethoscope, the RCP will listen and note quality of breath sounds and presence or absence of adventitious breath sounds in all lung fields, both anteriorly and posteriorly. 6. Upon completing the EMR review and physical assessment, the RCP will document findings in the RT Assessment section of the EMR. The score level will be provided and will be used to determine the frequency of therapy. V. Indications: A. Indications for Bronchial Hygiene Protocol will include: 1. Potential for or presence of atelectasis. 2. Need for hydration and removal of retained secretions. 3. Need for improvement of cough effectiveness. 4. Presence of conditions associated with disorder of pulmonary clearance: 
a. Cystic fibrosis b. Bronchiectasis B. Indications for Aerosolized Medication(s) Protocol should include: 1. Treatment of bronchospasm/wheezing 2. Improvement of mucociliary clearance 3. Treatment of stridor 4. History of Asthma or COPD 
           C.  Indications for Oxygen Therapy Protocol should include: 1. Documented hypoxemia 2. Severe trauma 3. Acute myocardial infarction 4. Short-term therapy (e.g. post anesthesia recovery) VI. Maintenance:   
D. Timely patient assessment is an integral part of this protocol therefore the following will be applied: 1. All non- critical care patients will be evaluated upon receiving initial respiratory care orders within 24 hours and re-evaluated within 48 hours (or more as needed). 2. Orders requesting a Respiratory Consult will be responded to in the following manner: a. In patient emergency situations, the RCP assigned to the floor will respond immediately to the patient, provide an initial respiratory assessment, and contact the patients physician as necessary for appropriate orders. b. In non-emergent situations, the RCP assigned to the floor will respond to the patient within 90 minutes and provide an initial respiratory assessment and contact patients physician as necessary for appropriate orders. c. An RCP will provide a comprehensive assessment as soon as possible. 3. Upon completion of an evaluation, the RCP will complete documentation in the patients EMR in the RT Assessment section. 4. The RCP who completes the assessment will document orders for therapy in the orders section of the patients EMR selecting new order. Next, per protocol should be selected indicating it is a protocol order and sign orders should be selected to complete the process. 5. The Pharmacy and Therapeutics (P&T) Committee has mandated that the medication Xopenex may be changed to unit dose albuterol without an order, except for those patients receiving Xopenex due to cardiac arrhythmias. The dosage for these patients should be 0.63 mg. and may be changed from 1.25 mg. to 0.63 mg per P & T Committee by the RCP completing the assessment. 6. Patients who are not experiencing cardiac arrhythmias, and are ordered Xopenex and Atrovent may be changed to Duoneb. VII. Safety: A. The following safety issues shall be monitored: 1. The RCP will perform hand hygiene per hospital policy utilizing Standard Precautions for all patients and following transmission-based isolation as indicated per hospital policy. 2. The RCP must exercise professional judgment in classifying the patient for frequency of therapy. 3. Appropriate classification of the patient will require an evaluation utilizing the Therapy Assessment Protocol Guidelines. 4. The RCP will confer with the physician concerning the care of the patient at any time questions or problems arise. 5. If during therapy, the patient exhibits no improvement or deterioration in clinical status the RCP will notify the physician and the patients nurse. VIII. Interventions: A. The patients nurse is responsible concerning all items related to his/her care. Ongoing communication with nursing is essential to successful protocol management. B. The RCP recognizes the value of the team approach in meeting the patients needs. Nursing input regarding the patients pulmonary condition will be sought as needed. IX. Reportable conditions: The RCP will inform the physician if: 1. There are acute changes in patients respiratory status. 2. The therapist is unable to determine appropriate care plan upon assessment. 3. The patient fails to reach therapeutic objective. 4. A change or additional medication is needed. X.  Patient Education: A. Patient will receive instruction on the followin. The treatment modality, including objectives and proper technique of therapy 2. Respiratory medications B. Documentation shall occur in the patient education section of the patients EMR. XI. Documentation: Record all findings as described above in the patients EMR. Related Protocols: A. Aerosolized Medication Protocol B. Bronchial Hygiene C. Oxygen Protocol D. Volume Expansion/Secretion Clearance E. Ventilator Weaning Protocols References: 
[address] Standard L    Respiratory Care Department Policy, Procedure and Protocol Guideline Manual, , KAITLIN ALCANTAR  Therapist Driven Respiratory Care Protocols  A Practitioners Guide for Criteria-Based Respiratory Care by Angie Ford M.D., and KAITLIN Navarro RRT. L  The rationale for therapist-driven protocols: an update. Respiratory Care 1998; 68:061-414 N   [facility] Clinical Practice Guidelines. Respiratory Care Services Policy Number: [id number] Title: Aerosolized Medication Protocol Effective Date: 10/1998 Revised Date: 06/13, 03/16, 11/17, 07/19 Reviewed Date: 05/14/ 03/15 , 06/17, 5/18, 11/2020 I. Policy: The Aerosolized Medication Protocol shall by implemented by Respiratory Care Practitioners (RCP) for patients with orders to receive aerosol therapy with medication. II. Purpose: To open and maintain obstructed airways, the RCP, will utilize the following  
protocol to select the indicated aerosolized medication(s) and determine the most effective method of delivery to the patient. III. Patient Type: All patients who are determined to meet aerosolized medication criteria as  
       outlined in this protocol. IV. Responsibility: Director, [address], registered Respiratory Care Practitioners (RCP's) with documented competency in the performance of respiratory therapeutic techniques. V. Equipment needed: Amari Saucedo I. Pulse oximeter J. AeroEclipse nebulizer K. Dry Powder Inhaler (DPI) VI. Protocol:  
G. The following conditions are accepted indications for aerosolized medication therapy. 5. Bronchospasm/wheezing 6. Impaired mucociliary clearance 7. Tracheobronchial mucosal congestion/and laryngeal stridor 8. Diseases which commonly require aerosolized medication therapy include, but are not limited to: 
f. Asthma/reactive airway disease 
g. Bronchitis/emphysema (COPD) h. Cystic fibrosis 
i. Severe laryngitis/tracheitis 
j. Bronchiectasis 
k. Smoke inhalation or chemical trauma to the lung or upper airway 
l. Physical trauma to the upper airway 
m. Laryngotracheobronchitis 
n. Bronchiolitis 
o. Non-specific wheezing B. Indications for bronchodilator medications will include: 
d. Bronchospasm/ wheezing 
e. Asthma/reactive airway disease 
f. Chronic obstructive pulmonary disease g. Obstructive defect on pulmonary function testing C. Administration of medications 5. If a bronchodilator or any other type of respiratory medication is needed, a physician order must be indicated in the medication section in the patient's EMR. 6. When the physician specifies the medication and dosage at the time of request, the ordered medication will be used as part of the care plan. D. The following guidelines will be utilized in the evaluation and selection of the appropriate delivery device for indicated medication(s): 
1. Unassisted aerosol (UA) is the preferred method of aerosol delivery and indicated if 
c. Ventilation is inadequate 
d. Patient demonstrates wheezing  
e. Routine treatments shall be given via the AeroEclipse nebulizer. f. The Aerogen nebulizer shall be used in the following circumstances: 
i. ER patients and they will continue with this nebulizer if admitted to 8th floor or ICU. 
ii. Patients in ICU  
iii. Patients on 8th floor with severe wheezing (at the RCP's discretion) 2. Dry PowderInhaler (DPI)  
d. Patient should be alert/cooperative 
e. Able to perform 3 second breath hold. f. Patient has used DPI therapy previously, either at home or in the hospital. 
g. Note: The only approved inhaler on formulary is Spiriva. VII. Guidelines:  
Monitor patient's vital signs and evaluate patient's clinical status. The need to change medication and/or modality may be indicated by: 5. A pulse greater than 120 bpm, or if a pulse increase of 20 bpm occurs with bronchodilator medications. 6. Significant worsening of dyspnea or wheezing occurring during or within 30 minutes of discontinuing therapy. 7. Worsening of patient's sensorium (e.g. patient becomes confused or obtunded, and unable to follow directions). 8. Worsening of patient's chest x-ray. 9. Change in sputum (e.g. increased pulmonary infiltrate, which might indicate need for volume expansion therapy). 10. Patient has difficulty coughing up secretions, which might indicate need for acetylcysteine and/or bronchial hygiene therapy. 11. Call physician immediately if dyspnea worsens and is not responsive to modifications allowed by protocol. VIII. Clinical Responsibility: 
2. The therapy assessment guidelines will be used to evaluate all patients receiving aerosolized medications with the exception of critical care areas. 5. RCP's will perform changes in therapy per protocol. 6. It will be the responsibility of RCP to provide instruction regarding respiratory medications, possible side effects, aerosol therapy and proper DPI technique, as well as, spacer usage to patients ordered DPI therapy. 7. Current therapy that is part of a patient's home regimen will not be discontinued. a. Provide spacer and educate patient on proper inhaler technique if needed. IX. Documentation D. Document assessment findings in the respiratory assessment section of the patient's EMR. E. Document changes in therapy per protocol in the respiratory orders section and in the care plan section of the patient's EMR. F. Document patient education in the patient education section of the patient's EMR. X. Outcome Criteria: 
I. Relief of wheezes and obstruction J. Improved cough and sputum color and consistency K. Improved chest x-ray L. Improved arterial oxygen tension and or SaO2 M. Improved Peak Flow on asthmatic patients XI. Related Protocols: J. Respiratory Patient Care Protocols K. Bronchial Hygiene Therapy L. Oxygen Protocol Reference: L - Respiratory Care Department Policy, Procedure and Protocol Guideline Manual, 1995, KAITLIN Perez. L -  Therapist Driven Respiratory Care Protocols  A Practitioner's Guide for Criteria-Based Respiratory Care by Cinthya Spencer M.D., and KAITLIN German RRT. L - The rationale for therapist-driven protocols: an update. Respiratory Care 1998; [id number]. [facility] Clinical Practice Guidelines. Respiratory Care Services Policy Number: [id number] Title: Bronchial Hygiene Protocol Effective Date: 01/1999 Revised Date: 12/2014, 11/2017, 7/2019 Reviewed Date: 06/2013, 05/2014, 03/2015, 03/2016, 06/2017, 4/2018, 11/2020 I. Purpose: The Respiratory Care Practitioner (RCP) will utilize the following protocol to select and initiate bronchial hygiene therapy to open and maintain obstructed airways when indicated. II. Patients: All patients who are ordered bronchial hygiene therapy. III. Clinical Area: All general patient floors IV. Protocol: The following conditions or diseases are indications for bronchial hygiene  
                        therapy. L. Oscillating PEP Therapy Indications should include:  
9. Atelectasis caused by mucus plugging or foreign body 10. Chronic mucociliary clearance disorders 11. Retained secretions which may be associated with the following conditions: 
p. Bronchitis 
q. Bronchiectasis 
r. Pneumonia M. PAP- Positive airway pressure therapy Indications include: 7. Patients with post-operative atelectasis or to prevent post operative atelectasis. 8. Patients who cannot perform deep breathing exercises due to pain. 9. Patients requiring lung expansion therapy who cannot follow instructions. 10. Patients requiring lung expansion therapy with poor inspiratory capacity <10cc/kg. 11. Patients requiring aerosol therapy in conjunction with opening their airways. N. Vibratory / Acoustical Airway Clearance Therapy (ACT)- i.e. (Vibralung, Vest, or Percussor) Indications should include 12. Patient conditions  that involve retained secretions, increased mucus production and defective mucociliary clearance such as: 
h. Cystic fibrosis 
i. Chronic bronchitis 
j. Bronchiectasis 
k. Pneumonia 
l. Asthma 
m. Muscular dystrophy 
n. Post-operative atelectasis o. Neuromuscular respiratory impairments 
p. ACT may be considered in patients with COPD with 
symptomatic secretion retention, guided by patient preference, 
toleration, and effectiveness of therapy Oriana Lizarraga et al., 2013). O. Nasotracheal suctioning indications should include: 
8. Inability to cough effectively 9. Excessive secretions 10. Artificial airway V. Equipment: A. PEP therapy device B. Vest therapy equipment Deya Mathew FAntoine NT suction equipment VI. Guidelines:   Monitor patient's vital signs and evaluate patient's clinical status. The need to  
                             change therapy modality may be indicated by: 
Erica Cagey in patient's sensorium (patient now confused or obtunded, and unable to follow directions). I. A significant deterioration is evident on patient's chest radiograph or increased sputum production. J. Increased thickening of secretions (e.g. mucolytic therapy may be indicated.) K. Development of wheezing L. Decrease in oxygen saturation M. Development of chest pain. VII. Clinical Responsibility: The Therapy Assessment Protocol guidelines will be used to  
             re-evaluate all patients on bronchial hygiene therapy (See Therapy Assessment Protocol). N. RCP's will perform changes in therapy according to protocol. Divina Leung O. Bronchial hygiene therapy may be discontinued when goals of therapy are met, e.g., secretions easily expectorated for 48 hours, atelectasis is resolved, etc. 
P. PAP Therapy may be utilized in place of IPPB therapy per discretion of the RCP, as approved by the Pulmonary Medicine and [facility] N Israel Riddle. VIII. Outcome Criteria:  Outcome criteria for bronchial hygiene therapy should include: M. Decrease in sputum production N. Improved breath sounds O. Improved arterial oxygen tension and/or SaO2 P. Improved chest X-ray Q. Subjective response to therapy IX. Documentation G. Document assessment findings in the respiratory assessment section of the patient's EMR. H. Document changes in therapy per protocol in the respiratory orders section and in the care plan section of the patient's EMR. I. Document patient education in the patient education section of the patient's EMR. X. Related Protocols: 3. Respiratory Patient Care Assessment Protocols 2. Aerosolized Medication Protocol 2. Oxygen Therapy Protocol Reference: L - Respiratory Care Department Policy, Procedure and Protocol Guideline Manual, 1995, KAITLIN Perez. L - Therapist Driven Respiratory Care Protocols  A Practitioner's Guide for Criteria-Based Respiratory Care by Jess Macias M.D., and KAITLIN Xiong RRT. N  [facility] Clinical Practice Guidelines. Timbo Blackman., Barrington Crowe., Jeet Cason., Adela Casas., Madalyn Clark., . . . MARIANA Lau. (2013, December). [facility] Clinical Practice Guideline: Effectiveness of Nonpharmacologic Airway Clearance Therapies in Hospitalized Patients. Respiratory Care, 58(21), 0552-4081. Retrieved June 28, 2019 Guideline Guideline Number: 5718- Title: Management of the Patient with Mechanical Ventilation (including weaning) and ABCDE Bundle Effective Date:  03/00 Revised Date: 02/09, 03/10, 7/12, 5/13,10/13, 8/14, 11/17, 5/18, 2/19 Reviewed Date: 07/2015, 04/2016, 06/2017, 7/19, 10/2020 I.  Policy:  Management of the patient requiring mechanical ventilation, including readiness to wean and weaning protocol. The information provided serves as a guideline for patient management. Included in this guidelines is the ABCDE Bundle to provide guidance to staff for evidence based management of pain, agitation/anxiety and delirium in the intensive care unit. The goals of critical care analgesia and sedation are to facilitate mechanical ventilation, to prevent patient and caregiver injury, and to avoid the psychological and physiologic consequences of inadequate treatment of pain, anxiety, agitation, and delirium by maintaining a light level of sedation. Pain occurs commonly in adult ICU patients, regardless of admitting diagnosis. Therefore, pain should be frequently assessed and analgesic medications titrated to prevent adverse effects associated with either inadequate or excessive analgesia. Once pain has been addressed, anxiolytic and/or antipsychotic medications can be utilized to treat unresolved agitation/anxiety and delirium, with the goal to prevent over- or under sedation by using the Sterling Agitation Sedation Scale (RASS). Assertive management of these issues has been shown to reduce costs, improve ICU outcomes such as successful extubation and ICU length of stay, and allow for patients to participate in their own care. II. Purpose: The respiratory care practitioner and the critical care RN will utilize the following guideline to provide the most efficient and effective management of mechanical ventilators and weaning and extubation processes. Goals of Treatment: 
12. Adequate management of patients pain and discomfort while maintaining a light level of sedation (RASS score of 0 to -1) 13. Both chronic and acute sources of pain should be identified and treated. 14. Sedative agents should be considered if patient still expresses discomfort and/or is not at RASS goal of 0 to -1 despite adequate management of pain.   
15. Patients requiring neuromuscular blockage must have continuous infusions of analgesic and sedative agents. 
 
III. Responsibility: Director Respiratory Care Services and all Respiratory Care Practitioners with documented competency as well as Critical Care RN staff. 
 
General Guidelines 
 
1.  Introduction to Ventilator Plan Phase 
a. Ventilator Management Phase, General Statement 
-  The plan should be initiated in patients who have a secure airway/require invasive mechanical ventilation (endotracheal or tracheostomy) only 
-   The provider determines the appropriate medications used for analgesia and agitation/anxiety along with the clinical pharmacist 
 
2. Monitoring Levels of Comfort 
a.  Pain Assessment 
- Pain is monitored using the numerical scales 
- A pain assessment should be conducted, at a minimum, every 4 hours and as needed and per guidelines. 
- The level of pain should be determined as satisfactory by the patient based on patient’s baseline level of pain , considering any chronic pain that the patient may have. 
- If the patient is unable to communicate pain level, the nurse can assess for nonverbal indicators including facial grimacing, moaning, tachypnea, tachycardia, hypertension, diaphoresis, etc as a cue to begin further pain assessment. 
 
b.  Sedation Assessment 
- Sedation is monitored using the Sterling Agitation Sedation Scale (RASS) 
Target RASS RASS Description  
+4 Combative, violent, danger to staff 
  
+3 Pulls or removes tubes(s) or catheters; aggressive  
+2 Frequent nonpurposeful movement, fights ventilator  
+1 Anxious, apprehensive, but not aggressive  
0 Alert and calm  
-1 Awakens to voice (eye opening/contact) > 10 sec  
-2 Light sedation, briefly awakens to voice (eye opening/contact) < 10 sec  
 -3 Moderate sedation, movement or eye opening. No eye contact  
-4 Deep sedation, no response to voice, but movement or eye opening to physical stimulation  
-5 Unarousable, no response to voice or physical stimulation  
 
-  Goal RASS is 0 to -1, unless otherwise specified by providers order. 
- Nursing staff should conduct the RASS every 4 hours and as needed to maintain goal RASS of 0 to -1. 
- If RASS is outside of goal range, discuss treatment options with provider. 
- A RASS score of +2 to +4 requires further assessment by the nurse. Causes of agitation/anxiety that should be considered include: 
a. Pulmonary -   endotracheal tube malposition or patency, mode of ventilation, pneumothorax, hypoxemia, hypercarbia 
b. Metabolic  hypoglycemia, hyponatremia, acute renal or hepatic failure 
c. Emotional upset  with information and awareness of critical condition, prognosis, need for surgical or invasive procedures, other interventions or complications, family or personal stressors 
- C. Sedation Assessment while using Neuromusclar Blocking Agents 
- D. Delirium Assessment 
a. the ICU (CAM  ICU) 3. Analgesia The incidence of significant pain has been reported to be 50% or higher in both medical and surgical ICU patients. These patients also experience discomfort during routine/procedural ICU care and at rest.  However, patients may be unable to self-report their pain (either verbally or with other signs) because of an altered level of consciousness, the use of mechanical ventilation, or high doses of sedative agents or neuromuscular blocking agents. The short and long term consequences of unrelieved or inadequately treated pain are significant and include patient discomfort, decreased satisfaction with care by family and patient, delirium, agitation/anxiety, post traumatic stress disorder and depression. Therefore, routine assessment and treatment of pain should occur in all ICU patients. Causes and Treatment of Pain in the ICU 
a. Acute pain (post-operative, procedural pain, discomfort with usual ICU care or other acute episodes of pain-related to underlying disease) 12. Consider use of PCA for alert and oriented patients with pain needs not met by PRN dosing or opoids. 13. Preemptive analgesia should occur prior to chest tube removal, and should be considered for other procedural pain such as turning and repositioning, would drain removal, wound dressing change, tracheal suctioning, femoral catheter removal or place of central venous catheter. 14. Appropriate analgesic medications for preemptive analgesia are short acting intravenous (IV) agents (i.e. fentanyl, morphine, hydromorphone) a. Administration of analgesia before patient experiences noxious stimuli prevents amplification and hyperexcitability of the central nervous system. b. Analgesia for Mechanically Ventilated Patients: 
1. The approach to sedation and analgesia management for mechanically ventilated patients favors use of analgesia first sedation. The primary goal of this strategy is to address pain and discomfort first, and then if necessary, add anxiolytic agent. 2. Analgesia first sedation reduces dose escalation of medications, decreases the duration of mechanical ventilation and the incidence of VAP, improves the probability of successful extubation, and ultimately shortens ICU length of stay. 3. For pain management, analgesic medications are determined by the provider. Intermittent dosing of the analgesic should be attempted first.   
If the patient requires more than 3 doses within 1 hour then provider should be contacted to consider continuous infusion. 4.  Analgesic options for mechanically ventilated patients include: 
s. Fentanyl which is considered the drug of choice for patients requiring continuous infusion. b.Morphine may be considered for those patients without renal dysfunction who are hemodynamically stable and require intermittent pain medication. Continuous infusions of morphine may be used for patientl who are receiving comfort care as part of end of life care. c.Hydromorphone is reserved for patients who are refractory to fentanyl or morphine and is typically admininstered by intermittent dosing. 4.  Agitation/Anxiety Background Agitation and anxiety frequently occur in ICU patients. Anxiolytic/sedation agents may be indicated to help relieve discomfort, improve synchrony with mechanical ventilation, and decrease the overall work of breathing. Pain control alone may be sufficient to make patients comfortable enough to require no anxiolytic/sedative agent. In addition, non-pharmacologic interventions such as repositioning or verbal assurance may be helpful to comfort or redirect an agitated patient. If these methods are unsuccessful, then anxiolytic/sedative medications such as propofol, dexmedetomidine, or benzodiazepines can be used. Selection of an anxiolytic should be based on the pharmacokinetic properties of the medication, patient specific characteristics, and sedation goal.   However, nonbenzodiazepine sedatives (ie propofol or dexmedetomidine) may be preferable over benzodiazepines (ie midazolam or larazepam) due to more favorable outcomes such as delirum. Causes and Treatment of Agitation/Anxiety 
q. Possible underlying causes of agitation and anxiety include pain, delirium, hypoxemia, hypoglycemia, hypotension, or withdrawal from alcohol  and other drugs. r. Analgesia first sedation should be attempted initially to manage pain and provide sedation in appropriate patients. Analgesia alone may be adequate to reach RASS goal of 0 to -1. If patient remains agitated or anxious despite adequate analgesia (ie RASS +2 to +4) then anxiolytic/sedative should be considered. s. The choice of anxiolytic should be based on desired levels of sedation (ie light sedation or deep sedation) with preference for the use of nonbenzodiazepines such as propofol or dexmedetomidine if appropriate. While light sedation (ie RASS 0 to -1) is preferred for most patients, there are instances when deep sedation (ie RASS -4 to -5) is desired. For example, in the setting of ventilator dysynchrony due to ARDS or for patients receiving NMB agents. t. Medications to maintain light sedation (ie RASS 0 to -1) include 1. Propofol continuous infusion can be considered for hemodynamically stable (ie SBP = 100, MAP = 65 and/or not requiring vasopressor support) patients requiring light sedation. Propofol has a quick onset (1-2 minutes) and offset of action, making it a good agent to assess neurological status and facilitate liberation from the mechanical ventilator. 2.Dexmedetomidine continuous infusion is a good option for hemodynamically stable patients requiring light sedation as it allows for a more awake, interactive patient is associated with less delirium. It has an intermediate onset of action (5-10 min). Therefore, abrupt titrations should be avoided, but use of prn haloperidol or benzodiazepine may be useful to manage agitation until the medication takes effect. 3. Antipsychotics are another option. In particular, haloperidol intermittently dosed may be useful for patients with symptoms of agitation/anxiety and delirium. 4.Benzodiazapines can also be considered for light sedation, but should be intermittently doses. Midazolam is an option for patients without renal dysfunction. It has a short onset of action (2-5 minutes) making it a good agent for acute agitation/anxiety, but short duration of action resulting in frequent dosing. Lorazepam is another option. It has a longer onset of action (15-20 minutes) in comparison to midazolam, but longer duration of action. u. Medications to maintain deep sedation (RASS -4 to -5) include: 
1) Propofol continuous infusion should be considered as a first line option for hemodynamically stable patients. 2)Benzodiazepines can be considered as second line options for deep sedation. Studies comparing these agents to other sedatives have shown that they lead to worse outcomes including delirium, oversedation, delayed extubation, and longer time to discharge. Midazolam is one option for patients without renal dysfunction and lorazepam is another options. If patient requires more than 3 doses within 1 hour then contact provider  to consider initiation of continuous infusion. 5.  Daily Sedation Awakening Trial (SAT) from IV Continuous Analgesia/Sedation 
g. Patients are to have daily awakening from sedation while on continuous IV analgesia and/or sedation in the ICU. Continuous analgesia infusions may be maintained only if needed for active pain and RASS is at goal 0 - -1. Unit guideline is for the SAT to occur following ICP rounds each morning.   
h. The sedation awakening trial (SAT) is done regardless if the patient meets criteria for spontaneous breathing trial (SBT). i. SAT safety screen is assessed and SAT should not be performed if sedation is being used for active seizures, alcohol withdrawal, hemodynamically unstable or requiring support of vasoactive medications , in conjunction with NMB agents, if ICP is greater than 
20mmHg or if sedation is being used to control ICP, patients RASS is +3 or +4 (very agitated or combative). Other exclusion criteria are:  if there is documentation of myocardial ischemia in the past 24 hours; or patient is receiving high frequency oscillator ventilation (HFOV) , if the patient has an open chest /abdomen or is receiving comfort care. 
j. Criteria for passing the SAT are the patient opened their eyes to verbal stimuli or tolerated sedative interruption without exhibiting failure criteria. k. Patients fail the SAT if the develop sustained anxiety, agitation, or pain; a respiratory rate of 35 per minutes for 5 minutes or longer, an SpO2 less than 88% for 5 minutes or longer; an acute cardiac dysrhythmia; two or more signs of respiratory distress including tachycardia, bradycardia; use of accessory muscles; diaphoresis; marked dyspnea; or myocardial ischemia. 
l. Respiratory therapy staff must verify with the nurse that continuous IV analgesia (unless being use  for active pain) and sedation (unless patient is receiving dexmedetomidine) is off prior to placing patient on SBT. 
m. DO NOT interrupt infusion of analgesia and sedation medications if patient is receiving neuromuscular blockade. n. Monitor level of wakefulness unless patient is awake and follows commands (RASS 0 to -1) or patient becomes uncomfortable or agitated (RASS +3 to +4) 
o. If agitation prevents successful awakening , administer bolus of analgesia and/or sedation then resume infusion of the medication at ½ previous dose and titrate as needed. p. If oversedation prevents successful awakening, hole infusion until at goal and resume ½ of prior infusion rate/dose if clinically indicated. 6. Delirium Background Delirium is characterized by the acute onset of cerebral dysfunction with a change or fluctuation baseline 
mental status, inattention, and either disorganized thinking or an altered level of consciousness. It affects up to 80% of mechanically ventilated adult ICU patients, and is associated with increased mortality,  
and treatment is important and may in turn allow for a patient to be conscious yet cooperative enough to participate  
in ventilator weaning trials and early mobilization efforts. Delirium can only be assessed in patients who are able to sufficiently interact and communicate with bedside 
clinicians (ie RASS -3 to +4). IV. Procedure: A. Assessment: The following criteria must be assessed prior to the initiation of weaning from mechanical ventilation. Note: The criteria are general guidelines and must be individualized for each patient. The patients primary nurse will be responsible, in coordination with the RT, the Spontaneous Awakening Trial). The RT will perform the SBT. B. Spontaneous Awakening Trials (SATs  also referred to as Sedation Vacation) and Spontaneous Breathing Trials (SBTs) performed to determine readiness to wean. 1. For patients who meet established criteria, such as those without active seizures, alcohol withdrawal and agitation, myocardial ischemia or those requiring cardiac support devices, without increased intracranial pressure and those not receiving neuromuscular blockade, the nurse will reduce the infusions of sedative by 50% of current used for sedation that was used to achieve a level of light sedation (Carbajal Score 2 or RASS score of 0 to -1) and evaluate patient response to reduction of sedation. Analgesics required for pain control are continued during the test.  Obtain MD order to cover no SAT for that time period if patient has any exclusion criteria as described above. 2. Failure of the spontaneous awakening trial occurs when the patient shows symptoms such as increased agitation, anxiety, pain or signs of respiratory distress including respiratory rate >35/min or oxygen saturation <88% as well as development of acute cardiac arrhythmias. If these symptoms develop during the SAT, the nurse then restarts sedation at 75% of the previous dose and titrates the medications until the patient is comfortable and/or symptoms have abated. 3.  If the patient passes the SAT then the patient moves on to the Spontaneous Breathing Trials as performed by the RT. The SBT Safety Screen included the following:  No agitation, oxygen saturation > 88%, FIO2 < 50%, PEEP < 7.5 cm H20, no myocardial ischemia, no vasopressor use, and with inspiratory efforts. 4. Patients who pass the spontaneous awakening trial but fail the spontaneous breathing trial are placed back on full ventilator support and reassessed the next day. 5. Failure of the SBT (spontaneous breathing trail) includes any of the following:  Respiratory rate > 35/min, respiratory rate < 8/min, oxygen saturation < 88%, respiratory distress, mental status change, acute cardiac arrhythmia. 6. Extubation is considered for patients who tolerate the spontaneous awakening trial and pass the spontaneous breathing trial.   
C. Can the cause of respiratory failure be reversed (i.e. absence of high spinal cord injury or advanced ALS)? D. Is gas exchange adequate? 1. PaO2/FIO2 ratio > 150  200, 2. PEEP < 8 cm H20 
3. FIO2 < 50 
4. pH > 7.30 
5. Rapid shallow breathing index (f/VT) < 105 
E. Is patient hemodynamically stable? 1. Absence of clinically significant hypotension (minimal vasopressors such as Dopamine < 5mcg/kg/minute)? F. Is there evidence of intact respiratory drive (NIP/NIF >-16 EDY14, stable VC02)? G. Does patient have an adequate cough, airway clearance ability? H. Is there absence of excessive secretions? V. Initiation: A. The therapist shall consult with RN to determine if sedation can be discontinued or significantly decreased. If this can be achieved, the therapist shall implement the  
                  followin. Identify patient and verify name and account number via ID bracelet. 2. Perform hand hygiene per hospital policy utilizing Standard Precautions for all patients and following transmission-based isolation as indicated per hospital policy. 3. Perform a ONE-MINUTE SPONTANEOUS TRIAL AND ASSESSMENT. 4. Measure Rapid Shallow Breathing Index (RSBI) and monitor SpO2 and cardiovascular parameters during the spontaneous breathing assessment. 5. If SpO2 and cardiovascular parameters are stable, continue spontaneous breathing trial for at least 30 minutes and up to 120 minutes, as patient tolerates. 6. Monitor ventilatory status, SpO2, and cardiovascular status during spontaneous breathing trial. 
7. If patient has a successful trial, consider patient as a candidate for extubation and obtain order. 8. If patient fails the weaning trial, place back on ventilator and adjust settings to provide a non-fatiguing form of ventilatory support for the remainder of the day and night. 9. One attempt at weaning shall be performed each day until successful weaning occurs. The RCP will make every attempt to begin the spontaneous breathing trials between 0500 and 0600 to provide documentation of the trial when the pulmonologist makes rounds. B.  Assessment of SBT or PST: 
13. Is gas exchange acceptable? 14. PaO2 > 60 mm Hg. 15. PH > 7.30 
16. Increase in PaCO2  < 10 mm Hg C. Is patient hemodynamically stable? 11. HR < 120 beats/minute 12. HR  < 20% 13. Systolic BP < 771 and > 90 mmHg 14. BP  < 20%, no vasopressors required D. Does patient have stable ventilatory pattern? 7. Sustained RR < 30 breaths per minute 8. Normal and stable VCO2 9. Patient is not demonstrating any signs of increased work of breathing, such as increased use of accessory muscles. E. Mental status stable throughout trial? 
6. Absence of changes such as somnolence, excessive agitation or anxiety 7. Absence of diaphoresis during trial? 
IV. Safety: P. The RCP shall monitor patient according to the above guidelines. If at any time during the weaning process, the respiratory therapist or nurse feels that the patient is not tolerating weaning, the therapist shall place patient back on previous ventilator settings. Q. The patient shall be reassessed and the weaning process should be continued the following day. V. Reportable Conditions: A. The therapist shall notify the physician, as appropriate, for any of the following         conditions: 
5. FIO2 increase (sustained) at 10% or greater 6. Poor patient/ventilator interface in spite of adjustments 7. Need for increased sedation for respiratory distress 8. Need for increasing ventilating pressures (i.e. PEEP, PIP, MAP) 9. ABG results meeting panic value criteria or other clinical signs indicating deterioration of patients condition. 10. Unplanned extubation. 11. Unexplained sustained increase in PIP greater than 10 cm H2O. 
12. Assessment results regarding ventilator discontinuance process. VI. Ventilator protocol management A. The following items should be maintained for patients who are being mechanically           ventilated. 3. Obtain STAT Chest X-Ray for ET tube placement after insertion. 4. Chest X-Ray q a.m. while on ventilator. 5. ABGs 30 - 60 minutes after being stable on the ventilator. 6. ABG's q a.m. while on ventilator and prn. 
7. Do spontaneous breathing trials when patient is hemodynamically stable, responsive, and without fever. 8. Terminate trials if patient exhibits signs of respiratory distress. 9. Therapists should maintain ABG s as follows: 
    a. pH -  7.30 - 7.50              
    b. PaO2 -   60  100  
     8. Racemic Epinephrine (0.5cc) for post extubation stridor (2 UA treatments max.) 
 
VII. Early Mobilization Mobility Level Criteria Start at Level 1 if:  
PaO2/FIO2 <250 Positive end-expiratory Pressure (PEEP) >=10 cm H2O  
O2 saturation <90% Respiratory Rate (RR) Not within 10-30 per min Cardiac arrhythmias or ischemia New onset Heart Rate  (HR) <60 or >120 beats per min Mean arterial pressure (MAP) <55 or >140 mmHg Systolic blood pressure (SBP) <90 or > 180 mmHg Vasopressor infusion New or increasing Sterling Agitation Scale (RASS) < - 3 Level I:   Breathe  (Rass -5 to -3) HOB Angle  improve VAP protocol compliance ? Visually confirm the [facility] is elevated >= 30 degrees to comply with VAP prevention protocols ? The Centers for Disease Control and Prevention recommends an HOB angle of 30-45 degrees , unless contraindicated Additional activities to be implemented ? Every 2 hour turning ? Passive range of motion ? Up to 20 degrees reverse trendelenburg with lower extremity exercise/retracting footboard ? Continuous lateral rotation therapy can be considered part of early mobility therapy in patients who are at high risk for pulmonary complications Move to Level 2 when the patient 
- Has acceptable oxygenation/hemodynamics - Tolerates q 2 turning - Tolerates HOB > 30 degrees or up to 20 degrees reverse trendelenburg Level 2 :Tilt  Patient Assessment Rass > -3  (eg, opens eyes, may have profound weakness) Up to 20 degrees Reverse Trendelenburg position and 10 degrees minimum HOB 
- Reverse Trendelenburg positioning allows for orthostatic position in fragile patients - If available , use in conjunction with retracting foot section to allow for partial weight bearing prior to sitting up in the bed or getting out of bed Additional activities to be implemented -  Maintain HOB >/= 30 degrees - Q 2 hour turning - Passive/active range of motion - Legs dependent - PT consultation Move to Level 3 when the patient . Mary Noyola -Tolerates active- assistance exercises 2 times per day 
  -Tolerates lower extremity exercises against footboard/Up to 20 degrees Reverse Trendelenburg 
  -Tolerates legs dependent /HOB 45 degrees Level 3 :  SIT  (Rass >- 1 (eg , weak but may move arms/legs independently) Full chair position (footboard on) ? Full upright positioning allows for diaphragmatic excursion and lung expansion ? Sitting with legs in a dependent position facilitates gas exchange Additional activities to be implemented - Maintain HOB >= 30 degrees - Q 2 hour turning (assisted) - Active range of motion  PT/OT actively involved - Encourage activities of daily living 
- Dangling, if patient can move arm against gravity Move to Level 4 when the patient . Farzaneh Delvalle - Tolerates increasing active exercise in bed - Actively assists with every- 2- hour turning or turns independently - Tolerates full chair position 3 times/day Level 4:  Stand ( RASS >0 (eg, weak but may tolerate increased activity) Stand Attempts ? Full chair position (footboard off/feet on the floor) ? Consider using a sit-to-stand lift ? Pivot to chair, it tolerates partial weight bearing Additional activities to be implemented - Maintain head of bed >= 30 degrees - Q 2 hr turning (self/assisted) - Active range of motion - Encourage activities of daily living 
- PT/OT actively involved Move to Level 5 when the patient . 
- Can successfully comply with all activities - Tolerates trial periods of full chair position (footboard off/feet on floor) 3 times per day - Tolerates partial weight-bearing stand and pivots to chair Level 5 :  Move  (RASS > 0    (eg, weak but may tolerate increased activity) Achieve out of bed ? Utilize mobile floor life to ambulate to bedside chair Additional activities to be implemented - Maintain HOB > = 30 degrees - Q 2 hour turning (self/assisted) - Active range of motion - Patient stands/bears weight > 1 minute - Patient marches in place 
- PT/OT actively involved Patient continues to ambulate progressively longer distances as tolerated until they consistently participate and move independently. E Approved by Critical Care Committee 2-19-09 N [facility] Clinical Guidelines ABCDE DOC Flowsheet Content Variables to select when addressing section Comments ABCDE Initiated ? Yes/No  RN to address minimum q 24 hours (day shift) Target RASS ? 0 = alert and oriented ? -1 = drowsy ? -2 = light sedation ? -3= moderate sedation ? -4= deep sedation Target on standard ventilator setting should be -2; -4 with oscillator CAM -ICU ? Positive ? Negative ? Unable to assess Delirium assessment SAT Safety Screen Passed ? Yes 
? No Select yes if proceeding on to the sedation vacation (reduction of continuous sedative drip by directed by MD) Select no if your patient has any of the below reasons for not proceeding on to the daily awakening sedation vacation trial  
SAT Screen for Failure ? Active seizures ? Acute delirium tremors ? Agitation that threatens accidental line/tube removal 
? On paralytics ? MI (24-48hr) ? Abnormal ICP ? Open abdomen Select one of the options when the patient will not undergo the sedation vacation  ALSO MUST OBTAIN AN ORDER FOR no Gisel Lathe written under nursing miscellaneous for now by either the NP or Intensivist  
Daily sedation Vacation/assessment of  ? Yes 
? No 
? Not applicable If yes, MUST see the reduction in sedation on the [facility] and please place in the comment section of the sedative sedation vacation started SBT Safety Screen Passed ? Yes 
? No Select Yes if the patient has none of the below listed reasons for not proceeding on to the SBT following reduction of sedation SBT Screen Reason for Failure ? Agitation ? O2 Sat < or = 88% 
? FIO2 > 50% ? PEEP >7 
? MI 
? Vasopressor Use 
? Bilevel setting on Vent ? Oscillator in use ? Increased resp effort Select reason as appropriate for NOT proceeding on to the SBT Wake Up and Breathe Protocol 05/2013

## 2021-02-10 NOTE — PROGRESS NOTES
Care Management Interventions PCP Verified by CM: Yue Acosta MD) Mode of Transport at Discharge: Other (see comment)(To [facility]) Transition of Care Consult (CM Consult): Discharge Planning, LTAC Discharge Durable Medical Equipment: No 
Physical Therapy Consult: Yes Occupational Therapy Consult: Yes Speech Therapy Consult: Yes Current Support Network: Family Lives Nearby(Pt lives in rented trailer. Primitivo Russ, her daughter, has lived with her intermittently) Confirm Follow Up Transport: Family The Plan for Transition of Care is Related to the Following Treatment Goals : LTAC The Patient and/or Patient Representative was Provided with a Choice of Provider and Agrees with the Discharge Plan?: Yes Name of the Patient Representative Who was Provided with a Choice of Provider and Agrees with the Discharge Plan: Trevor Acuna and Primitivo Russ [employer] of Choice List was Provided with Basic Dialogue that Supports the Patient's Individualized Plan of Care/Goals, Treatment Preferences and Shares the Quality Data Associated with the Providers?: Yes [employer] Information Provided?: No 
Discharge Location Discharge Placement: [address] [place] [address]) CM informed by pt's nurse that pt's daughter, Primitivo Russ ([phone]) has decided against comfort measures indefinitely. This CM contacted both daughters separately by phone to discuss goal of care. Pt's daughters remain at odds regarding goals for care. Both are agreeable to LTAC placement. CM sent referral to [facility] noting pt is remains on fentanyl (maximum dose at this time) and precedex. CM informed LTAC could take both of these drips if the unit total allowed. CM to continue to follow.

## 2021-02-10 NOTE — PROGRESS NOTES
Aguilar Yvette Admission Date: 12/24/2020 Daily Progress Note: 2/10/2021 The patient's chart is reviewed and the patient is discussed with the staff. Pt is an 77 yo female admitted 12/24 with acute respiratory failure secondary to COVID 19.  Pt was treated with Decadron and convalescent plasma. She had PRATIMA and was treated with Remdesivir.  She was initially on BiPAP but pt had a  cardiac arrest on 12/30 requiring intubation.  She has remained vent dependent and a trach was placed on 1/19. Pt was treated for an E coli UTII as well as MRSA in her sputum and abx were completed. He developed acute on chronic renal failure and started on dialysis on 1/12 but held on 1/25 with increased urine output with improvement and nephrology has signed off. Everardo Valenciaad holding on PEG due to multiple medical problems. Pt was seen by Neurology with an abnormal EEG with moderate to severe slowing. Brain MRI 1/26 revealed stable age-related senescent changes and chronic microvascular disease with remote right posterior temporal occipital ischemic insult. No evidence anoxic brain injury per neuro - deep encephalopathy. Palliative Care following and family has decided on comfort care on 2/6 at 2pm but family rescinded. Subjective:  
 
Patient remains trached and on vent. Spontaneous movement on fentanyl and precedex. Talk of family again disagreeing on plans for comfort care later today. Hgb <7 today. Current Facility-Administered Medications Medication Dose Route Frequency  dexmedeTOMidine in 0.9 % NaCl (PRECEDEX) 400 mcg/100 mL (4 mcg/mL) infusion soln  0.1-1.5 mcg/kg/hr IntraVENous TITRATE  fentaNYL in normal saline (pf) 25 mcg/mL infusion  0-200 mcg/hr IntraVENous TITRATE  
 0.9% sodium chloride infusion 250 mL  250 mL IntraVENous PRN  
 modafiniL (PROVIGIL) tablet 100 mg  100 mg Oral DAILY  0.9% sodium chloride infusion 250 mL  250 mL IntraVENous PRN  
  fentaNYL citrate (PF) injection 50 mcg  50 mcg IntraVENous Q1H PRN  
 insulin glargine (LANTUS) injection 30 Units  30 Units SubCUTAneous QHS  hydrALAZINE (APRESOLINE) 20 mg/mL injection 10 mg  10 mg IntraVENous Q6H PRN  
 0.9% sodium chloride infusion 250 mL  250 mL IntraVENous PRN  
 famotidine (PEPCID) tablet 20 mg  20 mg Oral DAILY  insulin regular (NOVOLIN R, HUMULIN R) injection   SubCUTAneous BID  [Held by provider] guanFACINE IR (TENEX) tablet 1 mg  1 mg Oral BID  docusate (COLACE) 50 mg/5 mL oral liquid 100 mg  100 mg Per NG tube DAILY PRN  
 heparin (porcine) 1,000 unit/mL injection 5,000 Units  5,000 Units IntraVENous DIALYSIS PRN  
 alcohol 62% (NOZIN) nasal  1 Ampule  1 Ampule Topical Q12H  
 0.9% sodium chloride infusion 250 mL  250 mL IntraVENous PRN  
 heparin (porcine) injection 5,000 Units  5,000 Units SubCUTAneous Q8H  
 bisacodyL (DULCOLAX) suppository 10 mg  10 mg Rectal DAILY PRN  
 NUTRITIONAL SUPPORT ELECTROLYTE PRN ORDERS   Does Not Apply PRN  
 hydrALAZINE (APRESOLINE) tablet 10 mg  10 mg Per NG tube TID  dextrose 40% (GLUTOSE) oral gel 1 Tube  15 g Oral PRN  
 glucagon (GLUCAGEN) injection 1 mg  1 mg IntraMUSCular PRN  
 dextrose (D50W) injection syrg 12.5-25 g  25-50 mL IntraVENous PRN  
 albuterol (PROVENTIL HFA, VENTOLIN HFA, PROAIR HFA) inhaler 2 Puff  2 Puff Inhalation Q4H PRN  phenol throat spray (CHLORASEPTIC) 1 Spray  1 Spray Oral PRN  
 lip protectant (BLISTEX) ointment 1 Each  1 Each Topical PRN  
 acetaminophen (TYLENOL) tablet 650 mg  650 mg Oral Q6H PRN  
 sodium chloride (NS) flush 5-40 mL  5-40 mL IntraVENous Q8H  
 sodium chloride (NS) flush 5-40 mL  5-40 mL IntraVENous PRN  
 albuterol (PROVENTIL VENTOLIN) nebulizer solution 2.5 mg  2.5 mg Nebulization Q6H RT  
 
 
Review of Systems Unobtainable due to patient status. Objective:  
 
Vitals:  
 02/10/21 0827 02/10/21 0829 02/10/21 0900 02/10/21 8985 BP:  (!) 95/54 132/62 (!) 97/55 Pulse: 62 62 95 74 Resp: 27 Temp:      
SpO2: 98% 99% (!) 89% 93% Weight:      
Height:      
 
Intake and Output:  
02/08 1901 - 02/10 0700 In: 4359.2 [I.V.:807.2] Out: 2488 [FRSZE:3455] No intake/output data recorded. Physical Exam:  
Constitution:  the patient is well developed and in no acute distress EENMT:  Sclera clear, pupils equal, oral mucosa moist 
Respiratory: Trach. Mild B rhonchi. Cardiovascular:  RRR without M,G,R 
Gastrointestinal: soft and non-tender; with positive bowel sounds. Musculoskeletal: warm without cyanosis. There is no lower leg edema. Skin:  no jaundice or rashes, no wounds Neurologic: spontaneously moving all 4. No tracking or command following. Psychiatric:  agitated CHEST XRAY:  
CXR Results  (Last 48 hours) None No new imaging LAB No lab exists for component: [place] Recent Labs  
  02/10/21 
0403 02/09/21 
7657 02/08/21 
8705 WBC 15.6* 14.0* 15.8* HGB 6.1* 7.0* 7.5* HCT 21.6* 24.0* 25.6*  
 168 180 Recent Labs  
  02/10/21 
0403 02/09/21 
3344 02/08/21 
9829 * 155* 153* K 4.3 4.7 4.6 * 111* 109* CO2 43* 45* 42* * 152* 151* * 116* 115* CREA 1.94* 1.84* 1.78* CA 9.7 9.6 10.0 ABG:   
Lab Results Component Value Date/Time PHI 7.40 02/10/2021 03:45 AM  
 PCO2I 75.2 (HH) 02/10/2021 03:45 AM  
 PO2I 81 02/10/2021 03:45 AM  
 HCO3I 46.4 (H) 02/10/2021 03:45 AM  
 FIO2I 50 02/10/2021 03:45 AM  
 
 
 
MICRO 
 
SARS-CoV-2 LAB Results [facility] Test: No results found for: COV2NT  
[facility] Test: No results found for: [facility] Test: No components found for: [id number] Sputum - moderate staph. Urine - > 100K GNR Assessment:  (Medical Decision Making) Hospital Problems  Date Reviewed: 2/5/2021 Codes Class Noted POA Encephalopathy ICD-10-CM: G93.40 ICD-9-CM: 348.30  2/7/2021 Unknown Cardiac arrest [facility]) ICD-10-CM: I46.9 ICD-9-CM: 427.5  12/30/2020 No  
   
 COVID-19 ICD-10-CM: U07.1 ICD-9-CM: 079.89  12/24/2020 Yes * (Principal) Acute respiratory distress syndrome (ARDS) due to severe acute respiratory syndrome coronavirus 2 (SARS-CoV-2) ([facility]) ICD-10-CM: U07.1, J80 
ICD-9-CM: 518.82, 079.89  12/24/2020 No  
   
 Acute hypoxemic respiratory failure ([facility] [address] ICD-10-CM: J96.01 
ICD-9-CM: 518.81  12/24/2020 Yes Acute on chronic renal failure ([facility]) ICD-10-CM: N17.9, N18.9 ICD-9-CM: 584.9, 585.9  9/17/2019 Yes Coronary artery disease involving coronary bypass graft of native heart without angina pectoris (Chronic) ICD-10-CM: H30.554 ICD-9-CM: 414.05  6/15/2015 Yes Uncontrolled type 2 diabetes mellitus with hyperglycemia (HCC) (Chronic) ICD-10-CM: E11.65 ICD-9-CM: 250.02  6/15/2015 Yes Diabetes mellitus with hyperosmolarity without hyperglycemic hyperosmolar nonketotic coma (HCC) (Chronic) ICD-10-CM: E11.00 ICD-9-CM: 250.20  11/24/2014 Yes Pt with covid 19, acute respiratory failure s/p cardiac arrest. Vented since 12/30 without significant improvement. Ongoing encephalopathy. Has been comfort care but was rescinded by one of her daughters. Difficulty in having family come to consensus on goals. Current plan is for comfort today, but phone call this morning indicates this may be changed again. Plan:  (Medical Decision Making)  
-will continue to treat as if goals are for ongoing aggressive care since it is unclear what the family decision is going to be.  
-transfuse 1 unit prbc. -sputum with staph and urine with GNR. Will add vanc and zosyn and follow up final sensitivities. -cont vent support and wean sedation when able, have not been overly successful thusfar.  
-getting scheduled hydralazine.  
-lantus with ssi.  
-cont prophy heparin, pepcid.   
 
 
 
Candice Ernst MD

## 2021-02-11 NOTE — PROGRESS NOTES
Ventilator check complete; patient has a #6XLT tracheostomy. Patient is not able to follow commands. Breath sounds are coarse. Trachea is midline and chest excursion is symmetric. Patient is also Negative for cyanosis. All alarms are set and audible. Resuscitation bag is at the head of the bed. Ventilator Settings Mode FIO2 Rate Tidal Volume Pressure PEEP I:E Ratio VC+  60 %   20 320 ml    8 cm H20  1:2.3 Peak airway pressure: 29 cm H2O Minute ventilation: 5.5 l/min Abdelrahman Neely

## 2021-02-11 NOTE — PROGRESS NOTES
Pharmacokinetic Consult to Pharmacist 
 
Jim Wu is a 78 y.o. female being treated with vancomycin. Height: 5' 5\" (165.1 cm)  Weight: 71.3 kg (157 lb 3 oz) Lab Results Component Value Date/Time  (H) 02/11/2021 03:07 AM  
 Creatinine 1.95 (H) 02/11/2021 03:07 AM  
 WBC 19.9 (H) 02/11/2021 03:07 AM  
 Procalcitonin 1.52 01/05/2021 07:39 AM  
 Lactic acid 1.4 12/24/2020 03:27 PM  
 Lactic Acid (POC) 1.55 09/16/2019 11:40 AM  
  
Estimated Creatinine Clearance: 22 mL/min (A) (based on SCr of 1.95 mg/dL (H)). Lab Results Component Value Date/Time Vancomycin,trough 15.3 09/06/2019 06:45 AM  
 Vancomycin, random 15.3 02/11/2021 11:45 AM  
 
 
Day 2 of vancomycin. Goal trough is 15-20. Random level is within goal range. Will give vancomycin 1000 mg IV x 1 today. Further levels will be ordered as clinically indicated. Pharmacy will continue to follow. Please call with any questions. Thank you, Kermit Presley, PharmD, [facility] Clinical Pharmacist 
[phone]

## 2021-02-11 NOTE — PROGRESS NOTES
Aguilar Yvette Admission Date: 12/24/2020 Daily Progress Note: 2/11/2021 The patient's chart is reviewed and the patient is discussed with the staff. Pt is an 77 yo female admitted 12/24 with acute respiratory failure secondary to COVID 19.  Pt was treated with Decadron and convalescent plasma. She had PRATIMA and was treated with Remdesivir.  She was initially on BiPAP but pt had a  cardiac arrest on 12/30 requiring intubation.  She has remained vent dependent and a trach was placed on 1/19. Pt was treated for an E coli UTII as well as MRSA in her sputum and abx were completed. He developed acute on chronic renal failure and started on dialysis on 1/12 but held on 1/25 with increased urine output with improvement and nephrology has signed off. Everardo Valenciaad holding on PEG due to multiple medical problems. Pt was seen by Neurology with an abnormal EEG with moderate to severe slowing. Brain MRI 1/26 revealed stable age-related senescent changes and chronic microvascular disease with remote right posterior temporal occipital ischemic insult. No evidence anoxic brain injury per neuro - deep encephalopathy. Palliative Care following and family has decided on comfort care on 2/6 at 2pm but family rescinded. Subjective:  
 
Patient remains trached and on vent. Spontaneous movement on fentanyl and precedex. Family rescinded comfort care again yesterday. Still with NG. On 60% FiO2 Patient is sedated and mechanically ventilated on high dose fentanyl, precedex. Current Facility-Administered Medications Medication Dose Route Frequency  piperacillin-tazobactam (ZOSYN) 4.5 g in 0.9% sodium chloride (MBP/ADV) 100 mL MBP  4.5 g IntraVENous Q12H  
 0.9% sodium chloride infusion 250 mL  250 mL IntraVENous PRN  Vancomycin Intermittent dosing per pharmacy    Other Rx Dosing/Monitoring  dexmedeTOMidine in 0.9 % NaCl (PRECEDEX) 400 mcg/100 mL (4 mcg/mL) infusion soln  0.1-1.5 mcg/kg/hr IntraVENous TITRATE  fentaNYL in normal saline (pf) 25 mcg/mL infusion  0-200 mcg/hr IntraVENous TITRATE  modafiniL (PROVIGIL) tablet 100 mg  100 mg Oral DAILY  fentaNYL citrate (PF) injection 50 mcg  50 mcg IntraVENous Q1H PRN  
 insulin glargine (LANTUS) injection 30 Units  30 Units SubCUTAneous QHS  hydrALAZINE (APRESOLINE) 20 mg/mL injection 10 mg  10 mg IntraVENous Q6H PRN  
 famotidine (PEPCID) tablet 20 mg  20 mg Oral DAILY  insulin regular (NOVOLIN R, HUMULIN R) injection   SubCUTAneous BID  [Held by provider] guanFACINE IR (TENEX) tablet 1 mg  1 mg Oral BID  docusate (COLACE) 50 mg/5 mL oral liquid 100 mg  100 mg Per NG tube DAILY PRN  
 heparin (porcine) 1,000 unit/mL injection 5,000 Units  5,000 Units IntraVENous DIALYSIS PRN  
 alcohol 62% (NOZIN) nasal  1 Ampule  1 Ampule Topical Q12H  
 heparin (porcine) injection 5,000 Units  5,000 Units SubCUTAneous Q8H  
 bisacodyL (DULCOLAX) suppository 10 mg  10 mg Rectal DAILY PRN  
 NUTRITIONAL SUPPORT ELECTROLYTE PRN ORDERS   Does Not Apply PRN  
 hydrALAZINE (APRESOLINE) tablet 10 mg  10 mg Per NG tube TID  dextrose 40% (GLUTOSE) oral gel 1 Tube  15 g Oral PRN  
 glucagon (GLUCAGEN) injection 1 mg  1 mg IntraMUSCular PRN  
 dextrose (D50W) injection syrg 12.5-25 g  25-50 mL IntraVENous PRN  
 albuterol (PROVENTIL HFA, VENTOLIN HFA, PROAIR HFA) inhaler 2 Puff  2 Puff Inhalation Q4H PRN  phenol throat spray (CHLORASEPTIC) 1 Spray  1 Spray Oral PRN  
 lip protectant (BLISTEX) ointment 1 Each  1 Each Topical PRN  
 acetaminophen (TYLENOL) tablet 650 mg  650 mg Oral Q6H PRN  
 sodium chloride (NS) flush 5-40 mL  5-40 mL IntraVENous Q8H  
 sodium chloride (NS) flush 5-40 mL  5-40 mL IntraVENous PRN  
 albuterol (PROVENTIL VENTOLIN) nebulizer solution 2.5 mg  2.5 mg Nebulization Q6H RT  
 
 
Review of Systems Unobtainable due to patient status. Objective:  
 
Vitals:  
 02/11/21 0545 02/11/21 0600 02/11/21 0605 02/11/21 0831 BP:  (!) 95/55 Pulse: 65 66  (!) 109 Resp: 28 27  27 Temp:   98.3 °F (36.8 °C) SpO2: 98% 97%  93% Weight:      
Height:      
 
Intake and Output:  
02/09 1901 - 02/11 0700 In: 4387.6 [I.V.:1293.9] Out: [address] [NWCOU:7158] No intake/output data recorded. Physical Exam:  
Constitution:  the patient is critically ill on vent EENMT:  trach Respiratory: Trach. B rhonchi on vent Cardiovascular:  RRR without M,G,R 
Gastrointestinal: soft and non-tender; with positive bowel sounds. Musculoskeletal: warm without cyanosis. There is no lower leg edema. Skin:  no jaundice or rashes, no wounds Neurologic: spontaneously moving all 4. No tracking or command following. agitated CHEST XRAY:  
 
 
 
CXR Results  (Last 48 hours) None No new imaging LAB No lab exists for component: [place] Recent Labs  
  02/11/21 
0307 02/10/21 2015 02/10/21 
0403 02/09/21 
3565 WBC 19.9*  --  15.6* 14.0* HGB 7.6* 7.6* 6.1* 7.0*  
HCT 25.6* 25.0* 21.6* 24.0*  
  --  153 168 Recent Labs  
  02/11/21 
0307 02/10/21 
0403 02/09/21 
3452 * 153* 155* K 4.6 4.3 4.7  110* 111* CO2 RESULTS VERIFIED, PHONED TO AND READ BACK BY 43* 45* * 187* 152* * 117* 116* CREA 1.95* 1.94* 1.84* CA 9.3 9.7 9.6 ABG:   
Lab Results Component Value Date/Time PHI 7.29 (L) 02/11/2021 03:59 AM  
 PCO2I 85.6 (HH) 02/11/2021 03:59 AM  
 PO2I 68 (L) 02/11/2021 03:59 AM  
 HCO3I 41.3 (H) 02/11/2021 03:59 AM  
 FIO2I 60 02/11/2021 03:59 AM  
 
 
 
MICRO 
 
SARS-CoV-2 LAB Results [facility] Test: No results found for: COV2NT  
[facility] Test: No results found for: [facility] Test: No components found for: [id number] Sputum - MRSA Urine - > 100K GNR- e coli Assessment:  (Medical Decision Making) Hospital Problems  Date Reviewed: 2/5/2021 Codes Class Noted POA Encephalopathy ICD-10-CM: G93.40 ICD-9-CM: 348.30  2/7/2021 Unknown Severe Cardiac arrest [facility]) ICD-10-CM: I46.9 ICD-9-CM: 427.5  12/30/2020 No  
 Was brief. Suspect most encephalopathy is due to COVID. COVID-19 ICD-10-CM: U07.1 ICD-9-CM: 079.89  12/24/2020 Yes * (Principal) Acute respiratory distress syndrome (ARDS) due to severe acute respiratory syndrome coronavirus 2 (SARS-CoV-2) (HCC) ICD-10-CM: U07.1, J80 
ICD-9-CM: 518.82, 079.89  12/24/2020 No  
 Ongoing Acute hypoxemic respiratory failure ([facility] [address] ICD-10-CM: J96.01 
ICD-9-CM: 518.81  12/24/2020 Yes Acute on chronic renal failure (HCC) ICD-10-CM: N17.9, N18.9 ICD-9-CM: 584.9, 585.9  9/17/2019 Yes Not dialysis candidate at this point. 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 Vented since 12/30 without significant improvement. Ongoing encephalopathy. Has been comfort care but was rescinded by one of her daughters. Difficulty in having family come to consensus on goals. Current plan is for comfort, but changed again. prophy heparin, pepcid. Plan:  (Medical Decision Making) -- hgb improved. --on high dose fentanyl and precedex, spoke with pharmacy. Doubt precedex is working at this point. Try and schedule opioid and d/c fentanyl drip. PRN pushes opioid and benzo 
-- she is on provigil per family request, will stop at this point -- start risperdal  
 -- sputum with staph and urine with GNR. Add vanc and zosyn (change to rocephin) and follow up final sensitivities. -- CXR today 
-- cont vent support and wean sedation when able, have not been overly successful thusfar. -- long term prognosis poor. palliative care signed off. -- still has NG at this point. Ben Alcantar, CHER Lungs:  Scattered rhonchi Heart:  RRR with no Murmur/Rubs/Gallops Neuro: not interactive. Flailing arms and legs without purpose. Prognosis appears grim. Family dynamics a major issue. Will see if risperdal can allow drop in drips. Additional Comments:  CXR looks wet; try to diurese. Risperdal just given. Await response. Spoke with daughter Zita Smith who is distressed that pt has not been allowed to be changed to comfort. Will not add pressors in event of hypotension as was agreed upon previously. I have examined the patient. I agree with the above assessment and plan as documented.  
 
Gabriela Su MD

## 2021-02-11 NOTE — PROGRESS NOTES
Ventilator check complete; patient has a #6.0 XLT Proximal tracheostomy. Patient is sedated. Patient is not able to follow commands. Breath sounds are diminished. Trachea is midline, Negative for subcutaneous air, and chest excursion is symmetric. Patient is also Negative for cyanosis. All alarms are set and audible. Resuscitation bag is at the head of the bed. Ventilator Settings Mode FIO2 Rate Tidal Volume Pressure PEEP I:E Ratio VC+  60 %    320 ml    8 cm H20  1:2.3 Peak airway pressure: 25 cm H2O Minute ventilation: 9.53 l/min ABG: No results for input(s): PH, PCO2, PO2, HCO3 in the last 72 hours.  
 
 
Magda Duran, RT

## 2021-02-11 NOTE — PROGRESS NOTES
Spiritual Care Visit, initial visit.  attempted to visit. Patient was sleeping and no family was present. Prayed for patient's healing and health. Visit by Shannan Hogan, Staff .  M.Ed., Th.B., B.A.

## 2021-02-11 NOTE — PROGRESS NOTES
Morning ABG results reviewed by myself and RT. MD notified of abnormal results. No changes advised at this time.

## 2021-02-12 NOTE — PROGRESS NOTES
Bill Manzanares Admission Date: 12/24/2020 Daily Progress Note: 2/12/2021 The patient's chart is reviewed and the patient is discussed with the staff. Pt is an 77 yo female admitted 12/24 with acute respiratory failure secondary to COVID 19.  Pt was treated with Decadron and convalescent plasma. She had PRATIMA and was treated with Remdesivir.  She was initially on BiPAP but pt had a  cardiac arrest on 12/30 requiring intubation.  She has remained vent dependent and a trach was placed on 1/19. Pt was treated for an E coli UTII as well as MRSA in her sputum and abx were completed. He developed acute on chronic renal failure and started on dialysis on 1/12 but held on 1/25 with increased urine output with improvement and nephrology has signed off. [place] holding on PEG due to multiple medical problems. Pt was seen by Neurology with an abnormal EEG with moderate to severe slowing. Brain MRI 1/26 revealed stable age-related senescent changes and chronic microvascular disease with remote right posterior temporal occipital ischemic insult. No evidence anoxic brain injury per neuro - deep encephalopathy. Palliative Care following and family has decided on comfort care on 2/6 at 2pm but family rescinded. Subjective:  
 
Patient remains trached and on vent. Weaned to precedex only. Family rescinded comfort care for a second time this week. Still with NG. On 45% FiO2. 
hgb continues to drop. Current Facility-Administered Medications Medication Dose Route Frequency  risperiDONE (RisperDAL) tablet 1 mg  1 mg Oral Q12H  
 HYDROmorphone (PF) (DILAUDID) injection 2 mg  2 mg IntraVENous Q3H PRN  
 LORazepam (ATIVAN) injection 2 mg  2 mg IntraVENous Q4H PRN  
 oxyCODONE IR (ROXICODONE) tablet 15 mg  15 mg Per NG tube Q6H  
 cefTRIAXone (ROCEPHIN) 2 g in 0.9% sodium chloride (MBP/ADV) 50 mL MBP  2 g IntraVENous Q24H  0.9% sodium chloride infusion 250 mL  250 mL IntraVENous PRN  Vancomycin Intermittent dosing per pharmacy    Other Rx Dosing/Monitoring  dexmedeTOMidine in 0.9 % NaCl (PRECEDEX) 400 mcg/100 mL (4 mcg/mL) infusion soln  0.1-1.5 mcg/kg/hr IntraVENous TITRATE  insulin glargine (LANTUS) injection 30 Units  30 Units SubCUTAneous QHS  hydrALAZINE (APRESOLINE) 20 mg/mL injection 10 mg  10 mg IntraVENous Q6H PRN  
 famotidine (PEPCID) tablet 20 mg  20 mg Oral DAILY  insulin regular (NOVOLIN R, HUMULIN R) injection   SubCUTAneous BID  docusate (COLACE) 50 mg/5 mL oral liquid 100 mg  100 mg Per NG tube DAILY PRN  
 heparin (porcine) 1,000 unit/mL injection 5,000 Units  5,000 Units IntraVENous DIALYSIS PRN  
 alcohol 62% (NOZIN) nasal  1 Ampule  1 Ampule Topical Q12H  
 heparin (porcine) injection 5,000 Units  5,000 Units SubCUTAneous Q8H  
 bisacodyL (DULCOLAX) suppository 10 mg  10 mg Rectal DAILY PRN  
 NUTRITIONAL SUPPORT ELECTROLYTE PRN ORDERS   Does Not Apply PRN  
 hydrALAZINE (APRESOLINE) tablet 10 mg  10 mg Per NG tube TID  dextrose 40% (GLUTOSE) oral gel 1 Tube  15 g Oral PRN  
 glucagon (GLUCAGEN) injection 1 mg  1 mg IntraMUSCular PRN  
 dextrose (D50W) injection syrg 12.5-25 g  25-50 mL IntraVENous PRN  
 albuterol (PROVENTIL HFA, VENTOLIN HFA, PROAIR HFA) inhaler 2 Puff  2 Puff Inhalation Q4H PRN  phenol throat spray (CHLORASEPTIC) 1 Spray  1 Spray Oral PRN  
 lip protectant (BLISTEX) ointment 1 Each  1 Each Topical PRN  
 acetaminophen (TYLENOL) tablet 650 mg  650 mg Oral Q6H PRN  
 sodium chloride (NS) flush 5-40 mL  5-40 mL IntraVENous Q8H  
 sodium chloride (NS) flush 5-40 mL  5-40 mL IntraVENous PRN  
 albuterol (PROVENTIL VENTOLIN) nebulizer solution 2.5 mg  2.5 mg Nebulization Q6H RT  
 
 
Review of Systems Unobtainable due to patient status. Objective:  
 
Vitals:  
 02/12/21 0242 02/12/21 0429 02/12/21 0800 02/12/21 0805 BP:  134/72 Pulse: 87 93  89 Resp: 27 29  (!) 31 Temp:   97.7 °F (36.5 °C) SpO2: 100% 97%  96% Weight:      
Height:      
 
Intake and Output:  
02/10 1901 - 02/12 0700 In: 3412.3 [I.V.:553.3] Out: 1390 [HJFTI:5786] 02/12 0701 - 02/12 1900 In: -  
Out: 150 [Urine:150] Physical Exam:  
Constitution:  the patient is critically ill on vent EENMT:  trach Respiratory: Trach. B rhonchi on vent Cardiovascular:  RRR without M,G,R 
Gastrointestinal: soft; with positive bowel sounds. Musculoskeletal: warm without cyanosis. There is no lower leg edema. Skin:  no jaundice or rashes, no wounds Neurologic: spontaneously moving all 4. No tracking or command following. Quad lumen NG Sita Stager [place] Drips: precedex Tube feedings CHEST XRAY:  
 
 
 
CXR Results  (Last 48 hours) 02/11/21 0930  XR CHEST SNGL V Final result Impression:  Diffuse bilateral infiltrates, severe congestive failure versus ARDS or pneumonia. Narrative:  CHEST X-RAY, one view. HISTORY:  Respiratory failure. TECHNIQUE:  AP portable semiupright view COMPARISON: 3 days prior, February 2021. FINDINGS:   
Lungs: Diffuse bilateral alveolar infiltrates. Costophrenic angles: Fairly sharp. Heart size: Borderline. Pulmonary vasculature: Indistinct. Aorta: Unremarkable. Included portion of the upper abdomen: is unremarkable. Bones: Sternal wires Other: Tracheostomy tube is present. NG tube is present. Left IJ central venous  
catheter is present No new imaging LAB No lab exists for component: [place] Recent Labs  
  02/12/21 
0346 02/11/21 
0307 02/10/21 2015 02/10/21 
0403 WBC 14.1* 19.9*  --  15.6* HGB 7.0* 7.6* 7.6* 6.1*  
HCT 23.7* 25.6* 25.0* 21.6*  
* 150  --  153 Recent Labs  
  02/12/21 
0346 02/11/21 
0307 02/10/21 
0403 * 149* 153* K 4.3 4.6 4.3 * 107 110* CO2 36* RESULTS VERIFIED, PHONED TO AND READ BACK BY 43* * 194* 187* * 124* 117* CREA 2.13* 1.95* 1.94* CA 9.1 9.3 9.7 ABG:   
Lab Results Component Value Date/Time PHI 7.30 (L) 02/12/2021 02:35 AM  
 PCO2I 81.7 (HH) 02/12/2021 02:35 AM  
 PO2I 163 (H) 02/12/2021 02:35 AM  
 HCO3I 40.0 (H) 02/12/2021 02:35 AM  
 FIO2I 60 02/12/2021 02:35 AM  
 
 
 
MICRO  
 
SARS-CoV-2 LAB Results [facility] Test: No results found for: COV2NT  
[facility] Test: No results found for: EDPR [facility] Test: No components found for: [id number] Sputum - MRSA Urine - > 100K GNR- e coli Assessment:  (Medical Decision Making) Hospital Problems  Date Reviewed: 2/5/2021 Codes Class Noted POA Encephalopathy ICD-10-CM: G93.40 ICD-9-CM: 348.30  2/7/2021 Unknown Severe Cardiac arrest [facility]) ICD-10-CM: I46.9 ICD-9-CM: 427.5  12/30/2020 No  
 Was brief. Suspect most encephalopathy is due to COVID. COVID-19 ICD-10-CM: U07.1 ICD-9-CM: 079.89  12/24/2020 Yes * (Principal) Acute respiratory distress syndrome (ARDS) due to severe acute respiratory syndrome coronavirus 2 (SARS-CoV-2) ([facility]) ICD-10-CM: U07.1, J80 
ICD-9-CM: 518.82, 079.89  12/24/2020 No  
 Ongoing Acute hypoxemic respiratory failure ([facility] [address] ICD-10-CM: J96.01 
ICD-9-CM: 518.81  12/24/2020 Yes Weaned FIO2 this AM  
 Acute on chronic renal failure (HCC) ICD-10-CM: N17.9, N18.9 ICD-9-CM: 584.9, 585.9  9/17/2019 Yes Not dialysis candidate at this point. Coronary artery disease involving coronary bypass graft of native heart without angina pectoris (Chronic) ICD-10-CM: L03.627 ICD-9-CM: 414.05  6/15/2015 Yes Appears to have some pulmonary congestion on CXR Uncontrolled type 2 diabetes mellitus with hyperglycemia (HCC) (Chronic) ICD-10-CM: E11.65 ICD-9-CM: 250.02  6/15/2015 Yes  Diabetes mellitus with hyperosmolarity without hyperglycemic hyperosmolar nonketotic coma (HCC) (Chronic) ICD-10-CM: E11.00 
 ICD-9-CM: 250.20  11/24/2014 Yes Pt with covid 19, acute respiratory failure s/p cardiac arrest. Vented since 12/30 without significant improvement. Ongoing encephalopathy. Has been comfort care but was rescinded by one of her daughters. Difficulty in having family come to consensus on goals. Current plan is for comfort, but changed again. prophy heparin, pepcid. Plan:  (Medical Decision Making) -- wean FiO2  
-- hgb marginal, has received transfusions this week. transfuse <7 
-- on risperdal, wean precedex to off, as able also has PRN. -- sputum with staph and urine with GNR. Add vanc and  rocephin. -- CXR with acute pulmonary edema, given lasix without diuresis. Up ~ 2.1 liters over the past 24 hours. --continue free water at 300 cc Q 4 hours per her free water deficit -- long term prognosis poor. palliative care signed off. 
-- labs in am 
-- still has NG at this point. Nora Horner, CHER Lungs:  Few scattered rhonchi Heart:  RRR with no Murmur/Rubs/Gallops Neuro: unresponsive Additional Comments:  Seems fairly comfortable. Weaned off fentanyl and hoping to [place] off precedex today. Getting risperdal. UOP from lasix yesterday not impressive. Will give again today. I have spoken with and examined the patient. I agree with the above assessment and plan as documented.  
 
Naila Reid MD

## 2021-02-12 NOTE — PROGRESS NOTES
Spiritual Care Visit, initial visit. Attempted to visit with patient at bedside. Patient did not respond, and no patient was present. Prayed for patient's health. Visit by Pavel Lancaster, Staff .  Pelon., Odalys.MELVIN., B.A.

## 2021-02-12 NOTE — PROGRESS NOTES
Ventilator check complete; patient has a #6.0 XLT-Proximal tracheostomy. Patient is sedated. Patient is not able to follow commands. Breath sounds are coarse and diminished. Trachea is midline, Negative for subcutaneous air, and chest excursion is symmetric. Patient is also Negative for cyanosis and is Negative for pitting edema. All alarms are set and audible. Resuscitation bag is at the head of the bed. Ventilator Settings Mode FIO2 Rate Tidal Volume Pressure PEEP I:E Ratio VC+  60 % 20  320 ml    8 cm H20  1:2.3 Peak airway pressure: 42 cm H2O Minute ventilation: 7.22 l/min Luella Paget, RT

## 2021-02-12 NOTE — PROGRESS NOTES
Care Management Interventions PCP Verified by CM: Yue Acosta MD) Mode of Transport at Discharge: Other (see comment)(To [place]) Transition of Care Consult (CM Consult): Discharge Planning, LTAC Discharge Durable Medical Equipment: No 
Physical Therapy Consult: Yes Occupational Therapy Consult: Yes Speech Therapy Consult: Yes Current Support Network: Family Lives Nearby(Pt lives in rented trailer. Primitivo Russ, her daughter, has lived with her intermittently) Confirm Follow Up Transport: Family The Plan for Transition of Care is Related to the Following Treatment Goals : LTAC The Patient and/or Patient Representative was Provided with a Choice of Provider and Agrees with the Discharge Plan?: Yes Name of the Patient Representative Who was Provided with a Choice of Provider and Agrees with the Discharge Plan: Kalpana, Trevor Mohan and Primitivo Russ [place] of Choice List was Provided with Basic Dialogue that Supports the Patient's Individualized Plan of Care/Goals, Treatment Preferences and Shares the Quality Data Associated with the Providers?: Yes [employer] Information Provided?: No 
Discharge Location Discharge Placement: [address] [place] [address] CM spoke with pt's primary and later f/u with pt's daughter, Trevor Mohan. Pt remains trach, 45%, PS- 8. [place] on. Pt not responding purposively. Trevor Mohan confides to this CM that she is very upset with the situation. DCP remains discharge to [facility] when medically stable.

## 2021-02-12 NOTE — PROGRESS NOTES
Ventilator check complete; patient has a #6.0 XLT tracheostomy. Patient is not able to follow commands. Breath sounds are coarse. Trachea is midline, Negative for subcutaneous air, and chest excursion is symmetric. Patient is also Negative for cyanosis and is Negative for pitting edema. All alarms are set and audible. Resuscitation bag is at the head of the bed. Ventilator Settings Mode FIO2 Rate Tidal Volume Pressure PEEP I:E Ratio VC+  40 %   20 350 ml  12 cm H2O  8 cm H20  1:1.9 Peak airway pressure: 31 cm H2O Minute ventilation: 11.8 l/min ABG: No results for input(s): PH, PCO2, PO2, HCO3 in the last 72 hours.  
 
 
Zuleyma Galvez, RT

## 2021-02-13 NOTE — PROGRESS NOTES
Ventilator check complete; patient has a #6.0 XLT tracheostomy. Patient is not able to follow commands. Breath sounds are coarse. Trachea is midline, Negative for subcutaneous air, and chest excursion is symmetric. Patient is also Negative for cyanosis and is Negative for pitting edema. All alarms are set and audible. Resuscitation bag is at the head of the bed. Ventilator Settings Mode FIO2 Rate Tidal Volume Pressure PEEP I:E Ratio VC+  45 %   24 350 ml  12 cm H2O  8 cm H20  1:1.9 Peak airway pressure: 35 cm H2O Minute ventilation: 12.9 l/min ABG: No results for input(s): PH, PCO2, PO2, HCO3 in the last 72 hours.  
 
 
Grace Galvez, RT

## 2021-02-13 NOTE — PROGRESS NOTES
Ventilator check complete; patient has a #6.0 XLT tracheostomy. Patient is sedated. Patient is not able to follow commands. Breath sounds are coarse. Trachea is midline, Negative for subcutaneous air, and chest excursion is symmetric. Patient is also Negative for cyanosis and is Negative for pitting edema. All alarms are set and audible. Resuscitation bag is at the head of the bed. Ventilator Settings Mode FIO2 Rate Tidal Volume Pressure PEEP I:E Ratio  
VC+, Assist control  45 %   24 bpm 350 ml    8 cm H20  1:1.9 Peak airway pressure: 40 cm H2O Minute ventilation: 13.4 l/min ABG: 
 
Gilda Akers RT

## 2021-02-13 NOTE — PROGRESS NOTES
Anita Layne Admission Date: 12/24/2020 Daily Progress Note: 2/13/2021 The patient's chart is reviewed and the patient is discussed with the staff. Pt is an 77 yo female admitted 12/24 with acute respiratory failure secondary to COVID 19.  Pt was treated with Decadron and convalescent plasma. She had PRATIMA and was treated with Remdesivir.  She was initially on BiPAP but pt had a  cardiac arrest on 12/30 requiring intubation.  She has remained vent dependent and a trach was placed on 1/19. Pt was treated for an E coli UTII as well as MRSA in her sputum and abx were completed. He developed acute on chronic renal failure and started on dialysis on 1/12 but held on 1/25 with increased urine output with improvement and nephrology has signed off. [place] holding on PEG due to multiple medical problems. Pt was seen by Neurology with an abnormal EEG with moderate to severe slowing. Brain MRI 1/26 revealed stable age-related senescent changes and chronic microvascular disease with remote right posterior temporal occipital ischemic insult. No evidence anoxic brain injury per neuro - deep encephalopathy. Palliative Care following and family has decided on comfort care on 2/6 at 2pm but family rescinded. Subjective:  
 
Patient remains trached and on vent. Weaned to precedex only. Family rescinded comfort care for a second time this week. Still with NG. On 45% FiO2. 
hgb continues to drop, down to 6.6 this AM.  Plans for transfusion. Current Facility-Administered Medications Medication Dose Route Frequency  0.9% sodium chloride infusion 250 mL  250 mL IntraVENous PRN  
 risperiDONE (RisperDAL) tablet 1 mg  1 mg Oral Q12H  
 HYDROmorphone (PF) (DILAUDID) injection 2 mg  2 mg IntraVENous Q3H PRN  
 LORazepam (ATIVAN) injection 2 mg  2 mg IntraVENous Q4H PRN  
 oxyCODONE IR (ROXICODONE) tablet 15 mg  15 mg Per NG tube Q6H  
 • cefTRIAXone (ROCEPHIN) 2 g in 0.9% sodium chloride (MBP/ADV) 50 mL MBP  2 g IntraVENous Q24H  
• 0.9% sodium chloride infusion 250 mL  250 mL IntraVENous PRN  
• Vancomycin Intermittent dosing per pharmacy    Other Rx Dosing/Monitoring  
• dexmedeTOMidine in 0.9 % NaCl (PRECEDEX) 400 mcg/100 mL (4 mcg/mL) infusion soln  0.1-1.5 mcg/kg/hr IntraVENous TITRATE  
• insulin glargine (LANTUS) injection 30 Units  30 Units SubCUTAneous QHS  
• hydrALAZINE (APRESOLINE) 20 mg/mL injection 10 mg  10 mg IntraVENous Q6H PRN  
• famotidine (PEPCID) tablet 20 mg  20 mg Oral DAILY  
• insulin regular (NOVOLIN R, HUMULIN R) injection   SubCUTAneous BID  
• docusate (COLACE) 50 mg/5 mL oral liquid 100 mg  100 mg Per NG tube DAILY PRN  
• heparin (porcine) 1,000 unit/mL injection 5,000 Units  5,000 Units IntraVENous DIALYSIS PRN  
• alcohol 62% (NOZIN) nasal  1 Ampule  1 Ampule Topical Q12H  
• heparin (porcine) injection 5,000 Units  5,000 Units SubCUTAneous Q8H  
• bisacodyL (DULCOLAX) suppository 10 mg  10 mg Rectal DAILY PRN  
• NUTRITIONAL SUPPORT ELECTROLYTE PRN ORDERS   Does Not Apply PRN  
• hydrALAZINE (APRESOLINE) tablet 10 mg  10 mg Per NG tube TID  
• dextrose 40% (GLUTOSE) oral gel 1 Tube  15 g Oral PRN  
• glucagon (GLUCAGEN) injection 1 mg  1 mg IntraMUSCular PRN  
• dextrose (D50W) injection syrg 12.5-25 g  25-50 mL IntraVENous PRN  
• albuterol (PROVENTIL HFA, VENTOLIN HFA, PROAIR HFA) inhaler 2 Puff  2 Puff Inhalation Q4H PRN  
• phenol throat spray (CHLORASEPTIC) 1 Spray  1 Spray Oral PRN  
• lip protectant (BLISTEX) ointment 1 Each  1 Each Topical PRN  
• acetaminophen (TYLENOL) tablet 650 mg  650 mg Oral Q6H PRN  
• sodium chloride (NS) flush 5-40 mL  5-40 mL IntraVENous Q8H  
• sodium chloride (NS) flush 5-40 mL  5-40 mL IntraVENous PRN  
• albuterol (PROVENTIL VENTOLIN) nebulizer solution 2.5 mg  2.5 mg Nebulization Q6H RT  
 
 
Review of Systems 
Unobtainable due to patient status. 
 
Objective:  
 
 Vitals:  
 02/13/21 0459 02/13/21 0529 02/13/21 0659 02/13/21 0700  
BP: (!) 159/74 (!) 162/70 (!) 169/79   
Pulse: 90 90  85  
Resp:      
Temp:      
SpO2: 100% 98%  99%  
Weight:      
Height:      
 
Intake and Output:  
02/11 1901 - 02/13 0700 
In: 3902 [I.V.:323] 
Out: 2295 [Urine:2295] 
No intake/output data recorded. 
 
Physical Exam:  
Constitution:  the patient is critically ill on vent  
EENMT:  trach 
Respiratory: Trach. B rhonchi on vent 
Cardiovascular:  RRR without M,G,R 
Gastrointestinal: soft; with positive bowel sounds. 
Musculoskeletal: warm without cyanosis. There is no lower leg edema. 
Skin:  no jaundice or rashes, no wounds  
Neurologic: spontaneously moving all 4. No tracking or command following.  
 
Quad lumen 
NG 
Trach 
[place] 
 
Drips: precedex 
 
Tube feedings 
 
CHEST XRAY:  
 
02/11/2021 02/08/2021 
 
 
CXR Results  (Last 48 hours)  
          
 02/11/21 0930  XR CHEST SNGL V Final result  
 Impression:  Diffuse bilateral infiltrates, severe congestive failure versus  
ARDS or pneumonia.   
   
   
   
  
 Narrative:  CHEST X-RAY, one view.  
   
HISTORY:  Respiratory failure.  
   
TECHNIQUE:  AP portable semiupright view  
   
COMPARISON: 3 days prior, February 2021.   
   
FINDINGS:   
Lungs: Diffuse bilateral alveolar infiltrates.   
Costophrenic angles: Fairly sharp.   
Heart size: Borderline.   
Pulmonary vasculature: Indistinct.   
Aorta: Unremarkable.  
Included portion of the upper abdomen: is unremarkable.   
Bones: Sternal wires  
Other: Tracheostomy tube is present. NG tube is present. Left IJ central venous  
catheter is present  
   
  
  
 
No new imaging 
 
LAB 
No lab exists for component: GLPOC  
Recent Labs  
  02/13/21 0318 02/12/21 0346 02/11/21 
0307 02/10/21 
2015  
WBC 12.6* 14.1* 19.9*  --   
HGB 6.6* 7.0* 7.6* 7.6*  
HCT 21.7* 23.7* 25.6* 25.0*  
 149* 150  --   
 
Recent Labs  
  02/13/21 0318 02/12/21 0346 02/11/21 
0307  
 * 147* 149*  
K 3.7 4.3 4.6 * 108* 107 CO2 36* 36* RESULTS VERIFIED, PHONED TO AND READ BACK BY  
* 166* 194* * 123* 124* CREA 1.92* 2.13* 1.95* CA 9.4 9.1 9.3 ABG:   
Lab Results Component Value Date/Time PHI 7.39 02/13/2021 03:20 AM  
 PCO2I 61.7 (HH) 02/13/2021 03:20 AM  
 PO2I 91 02/13/2021 03:20 AM  
 HCO3I 37.2 (H) 02/13/2021 03:20 AM  
 FIO2I 45 02/13/2021 03:20 AM  
 
 
 
MICRO  
 
SARS-CoV-2 LAB Results [facility] Test: No results found for: COV2NT  
[facility] Test: No results found for: EDPR [facility] Test: No components found for: [id number] Sputum - MRSA Urine - > 100K GNR- e coli Assessment:  (Medical Decision Making) Hospital Problems  Date Reviewed: 2/5/2021 Codes Class Noted POA Encephalopathy ICD-10-CM: G93.40 ICD-9-CM: 348.30  2/7/2021 Unknown Cardiac arrest [facility]) ICD-10-CM: I46.9 ICD-9-CM: 427.5  12/30/2020 No  
   
 COVID-19 ICD-10-CM: U07.1 ICD-9-CM: 079.89  12/24/2020 Yes * (Principal) Acute respiratory distress syndrome (ARDS) due to severe acute respiratory syndrome coronavirus 2 (SARS-CoV-2) ([facility]) ICD-10-CM: U07.1, J80 
ICD-9-CM: 518.82, 079.89  12/24/2020 No  
   
 Acute hypoxemic respiratory failure ([facility] [address] ICD-10-CM: J96.01 
ICD-9-CM: 518.81  12/24/2020 Yes Acute on chronic renal failure (HCC) ICD-10-CM: N17.9, N18.9 ICD-9-CM: 584.9, 585.9  9/17/2019 Yes Coronary artery disease involving coronary bypass graft of native heart without angina pectoris (Chronic) ICD-10-CM: I27.808 ICD-9-CM: 414.05  6/15/2015 Yes Uncontrolled type 2 diabetes mellitus with hyperglycemia (HCC) (Chronic) ICD-10-CM: E11.65 ICD-9-CM: 250.02  6/15/2015 Yes Diabetes mellitus with hyperosmolarity without hyperglycemic hyperosmolar nonketotic coma (HCC) (Chronic) ICD-10-CM: E11.00 ICD-9-CM: 250.20  11/24/2014 Yes Pt with covid 19, acute respiratory failure s/p cardiac arrest. Vented since 12/30 without significant improvement. Ongoing encephalopathy. Has been comfort care but was rescinded by one of her daughters. Difficulty in having family come to consensus on goals. Current plan is for comfort, but changed again. prophy heparin, pepcid. Plan:  (Medical Decision Making) -- wean FiO2 as tolerated --Hgb 6.6 this AM, plan to transfuse today 
-- on risperdal with precedex off 
-- sputum with staph and urine with GNR. vanc D3  and  Rocephin D2 
--CXR on 2/11 showed worsening pulm edema>>was given some lasix, but unable to remain on  R/t kidney function and outpt was not significantly impressive 
--cont free water -- long term prognosis poor. palliative care signed off. -- still has NG at this point, may need to consider PEG 
--need to cont discussions with family regarding long term poor prognosis, she has been comfort a few times and they have rescinded their decision. Kristan Vuong NP Lungs:  Scattered rhonchi, Ve 12.6 Heart:  RRR with no Murmur/Rubs/Gallops Additional Comments:  Now off precedex. Intermittently restless. Still overbreathing vent. UOP better yesterday and creatinine down. Will give additional lasix today. BUN high but not a dialysis candidate. I have examined the patient. I agree with the above assessment and plan as documented.  
 
Ruel Moss MD

## 2021-02-14 NOTE — PROGRESS NOTES
Drew Rhodes Admission Date: 12/24/2020 Daily Progress Note: 2/14/2021 The patient's chart is reviewed and the patient is discussed with the staff. Pt is an 79 yo female admitted 12/24 with acute respiratory failure secondary to COVID 19.  Pt was treated with Decadron and convalescent plasma. She had PRATIMA and was treated with Remdesivir.  She was initially on BiPAP but pt had a  cardiac arrest on 12/30 requiring intubation.  She has remained vent dependent and a trach was placed on 1/19. Pt was treated for an E coli UTII as well as MRSA in her sputum and abx were completed. He developed acute on chronic renal failure and started on dialysis on 1/12 but held on 1/25 with increased urine output with improvement and nephrology has signed off. Amelia Christensen holding on PEG due to multiple medical problems. Pt was seen by Neurology with an abnormal EEG with moderate to severe slowing. Brain MRI 1/26 revealed stable age-related senescent changes and chronic microvascular disease with remote right posterior temporal occipital ischemic insult. No evidence anoxic brain injury per neuro - deep encephalopathy. Palliative Care following and family has decided on comfort care on 2/6 at 2pm but family rescinded. Subjective:  
 
Patient remains trached and on vent. Off sedation, still overbreathing vent and appears uncomfortable. Noted hemoptysis with suctioning and Hgb lower this AM, down to 6.4. No other signs of bleeding. Family rescinded comfort care for a second time this past week. Still with NG. On 45% FiO2. Current Facility-Administered Medications Medication Dose Route Frequency  0.9% sodium chloride infusion 250 mL  250 mL IntraVENous PRN  
 albuterol (PROVENTIL VENTOLIN) nebulizer solution 2.5 mg  2.5 mg Nebulization Q6H RT  
 risperiDONE (RisperDAL) tablet 1 mg  1 mg Oral Q12H  
 HYDROmorphone (PF) (DILAUDID) injection 2 mg  2 mg IntraVENous Q3H PRN  
  LORazepam (ATIVAN) injection 2 mg  2 mg IntraVENous Q4H PRN  
 oxyCODONE IR (ROXICODONE) tablet 15 mg  15 mg Per NG tube Q6H  
 cefTRIAXone (ROCEPHIN) 2 g in 0.9% sodium chloride (MBP/ADV) 50 mL MBP  2 g IntraVENous Q24H  
 0.9% sodium chloride infusion 250 mL  250 mL IntraVENous PRN  Vancomycin Intermittent dosing per pharmacy    Other Rx Dosing/Monitoring  dexmedeTOMidine in 0.9 % NaCl (PRECEDEX) 400 mcg/100 mL (4 mcg/mL) infusion soln  0.1-1.5 mcg/kg/hr IntraVENous TITRATE  insulin glargine (LANTUS) injection 30 Units  30 Units SubCUTAneous QHS  hydrALAZINE (APRESOLINE) 20 mg/mL injection 10 mg  10 mg IntraVENous Q6H PRN  
 famotidine (PEPCID) tablet 20 mg  20 mg Oral DAILY  insulin regular (NOVOLIN R, HUMULIN R) injection   SubCUTAneous BID  docusate (COLACE) 50 mg/5 mL oral liquid 100 mg  100 mg Per NG tube DAILY PRN  
 heparin (porcine) 1,000 unit/mL injection 5,000 Units  5,000 Units IntraVENous DIALYSIS PRN  
 alcohol 62% (NOZIN) nasal  1 Ampule  1 Ampule Topical Q12H  
 heparin (porcine) injection 5,000 Units  5,000 Units SubCUTAneous Q8H  
 bisacodyL (DULCOLAX) suppository 10 mg  10 mg Rectal DAILY PRN  
 NUTRITIONAL SUPPORT ELECTROLYTE PRN ORDERS   Does Not Apply PRN  
 hydrALAZINE (APRESOLINE) tablet 10 mg  10 mg Per NG tube TID  dextrose 40% (GLUTOSE) oral gel 1 Tube  15 g Oral PRN  
 glucagon (GLUCAGEN) injection 1 mg  1 mg IntraMUSCular PRN  
 dextrose (D50W) injection syrg 12.5-25 g  25-50 mL IntraVENous PRN  
 albuterol (PROVENTIL HFA, VENTOLIN HFA, PROAIR HFA) inhaler 2 Puff  2 Puff Inhalation Q4H PRN  phenol throat spray (CHLORASEPTIC) 1 Spray  1 Spray Oral PRN  
 lip protectant (BLISTEX) ointment 1 Each  1 Each Topical PRN  
 acetaminophen (TYLENOL) tablet 650 mg  650 mg Oral Q6H PRN  
 sodium chloride (NS) flush 5-40 mL  5-40 mL IntraVENous Q8H  
 sodium chloride (NS) flush 5-40 mL  5-40 mL IntraVENous PRN Review of Systems Unobtainable due to patient status. Objective:  
 
Vitals:  
 02/14/21 0913 02/14/21 0459 02/14/21 0529 02/14/21 0559 BP: (!) 147/71 (!) 149/70 134/64 137/64 Pulse: 97 (!) 102 93 100 Resp:      
Temp:      
SpO2: 100% 98% 98% 98% Weight:      
Height:      
 
Intake and Output:  
02/12 1901 - 02/14 0700 In: 400 [I.V.:400] Out: 2400 [Urine:2400] No intake/output data recorded. Physical Exam:  
Constitution:  the patient is critically ill on vent, appears uncomfortable, trashing arms around. EENMT:  trach Respiratory: Trach. B rhonchi on vent Cardiovascular:  RRR without M,G,R 
Gastrointestinal: soft; with positive bowel sounds. Musculoskeletal: warm without cyanosis. There is no lower leg edema. Skin:  no jaundice or rashes, no wounds Neurologic: spontaneously moving all 4. Appears uncomfortable No tracking or command following. Lines:  
Quad lumen NG Machelle Rasp [place] Drips: none Nutrition:  
Tube feedings CHEST XRAY:  
02/14/2021 None today 02/11/2021 02/08/2021 LAB No lab exists for component: [place] Recent Labs  
  02/13/21 0318 02/12/21 
0346 WBC 12.6* 14.1* HGB 6.6* 7.0*  
HCT 21.7* 23.7*  
 149* Recent Labs  
  02/13/21 0318 02/12/21 
0346 * 147* K 3.7 4.3 * 108* CO2 36* 36* * 166* * 123* CREA 1.92* 2.13* CA 9.4 9.1 ABG:   
Lab Results Component Value Date/Time PHI 7.41 02/14/2021 02:51 AM  
 PCO2I 58.7 (H) 02/14/2021 02:51 AM  
 PO2I 74 (L) 02/14/2021 02:51 AM  
 HCO3I 37.2 (H) 02/14/2021 02:51 AM  
 FIO2I 45 02/14/2021 02:51 AM  
 
 
 
MICRO  
 
SARS-CoV-2 LAB Results [facility] Test: No results found for: COV2NT  
[facility] Test: No results found for: [facility] Test: No components found for: [id number] Sputum - MRSA Urine - > 100K GNR- e coli Assessment:  (Medical Decision Making) Hospital Problems  Date Reviewed: 2/5/2021 Codes Class Noted POA Encephalopathy ICD-10-CM: G93.40 ICD-9-CM: 348.30  2/7/2021 Unknown Ongoing Cardiac arrest [facility]) ICD-10-CM: I46.9 ICD-9-CM: 427.5  12/30/2020 No  
   
 COVID-19 ICD-10-CM: U07.1 ICD-9-CM: 079.89  12/24/2020 Yes Post Treatment * (Principal) Acute respiratory distress syndrome (ARDS) due to severe acute respiratory syndrome coronavirus 2 (SARS-CoV-2) (HCC) ICD-10-CM: U07.1, J80 
ICD-9-CM: 518.82, 079.89  12/24/2020 No  
 Ongoing Acute hypoxemic respiratory failure ([facility] [place] [address] ICD-10-CM: J96.01 
ICD-9-CM: 518.81  12/24/2020 Yes Acute on chronic renal failure (HCC) ICD-10-CM: N17.9, N18.9 ICD-9-CM: 584.9, 585.9  9/17/2019 Yes Cr 1.57 Coronary artery disease involving coronary bypass graft of native heart without angina pectoris (Chronic) ICD-10-CM: X01.632 ICD-9-CM: 414.05  6/15/2015 Yes Uncontrolled type 2 diabetes mellitus with hyperglycemia (HCC) (Chronic) ICD-10-CM: E11.65 ICD-9-CM: 250.02  6/15/2015 Yes Diabetes mellitus with hyperosmolarity without hyperglycemic hyperosmolar nonketotic coma (HCC) (Chronic) ICD-10-CM: E11.00 ICD-9-CM: 250.20  11/24/2014 Yes Pt with covid 19, acute respiratory failure s/p cardiac arrest. Vented since 12/30 without significant improvement. Ongoing encephalopathy. Has been comfort care but was rescinded by one of her daughters. Difficulty in having family come to consensus on goals. Current plan is for comfort, but changed again. prophy heparin, pepcid. Plan:  (Medical Decision Making) -- wean FiO2 as tolerated 
--transfuse 2 units PRBC this AM, repeat H/H 2 hours after completion of 2nd unit 
--hold heparin for now r/t hemoptysis and low Hgb. Place SCDs for DVT prevention. --appears uncomfortable, RN to try PRN ativan -- sputum with staph and urine with GNR. vanc D4  and  Rocephin D3 
--repeat CXR tomorrow AM 
--cont free water -- still has NG at this point, may need to consider PEG if family wants to cont with aggressive treatment 
--need to cont discussions with family regarding long term poor prognosis, she has been comfort a few times and they have rescinded their decision. --will give one dose of lasix today, BUN/creatinine elevated. Pt is not a dialysis candidate 
--DNR Jimi Forrest, CHER Lungs: Few rhonchi Heart:  RRR with no Murmur/Rubs/Gallops Additional Comments:  Still intermittently agitates. Getting 2 U PRBC. Give lasix due to the volume. CXR tomorrow. Terrible situation for the patient with dismal prognosis. PEG will likely be needed. I have examined the patient. I agree with the above assessment and plan as documented.  
 
Naila Reid MD

## 2021-02-14 NOTE — PROGRESS NOTES
Bedside, Verbal and Written shift change report given to [phone] (oncoming nurse) by Damon Appiah RN (offgoing nurse). Report included the following information SBAR, Kardex, ED Summary, Procedure Summary, Intake/Output, MAR, Accordion, Recent Results, Med Rec Status, Cardiac Rhythm NSR/ST and Alarm Parameters .

## 2021-02-14 NOTE — PROGRESS NOTES
Ventilator check complete; patient has a #6 XLT SHILEY tracheostomy. Patient is not sedated. Patient is not able to follow commands. Breath sounds are coarse. Trachea is midline, Negative for subcutaneous air, and chest excursion is symmetric. Patient is also Negative for cyanosis and is Negative for pitting edema. All alarms are set and audible. Resuscitation bag is at the head of the bed. Ventilator Settings Mode FIO2 Rate Tidal Volume Pressure PEEP I:E Ratio VC+  45 %   24 bpm 350 ml    8 cm H20  1:1.9 Peak airway pressure: 38 cm H2O Minute ventilation: 13.4 l/min ABG:  
 
 
Berry Bamberger, RT

## 2021-02-15 NOTE — CONSULTS
Acknowledge TF consult \"Should we change the formula to help correct Na? \" (Pulmonary) Patient is current receiving Nepro (at 35 ml/hr) which is lowest Na provision formula available. Free water flushes adjusted by Pulmonary 2/9 to 300 ml Q4. Current TF rate and free water flush providing ~2500 ml free water (~1.6 ml/kcal). Per [employer] conversation with Poonam Biswas, NP: Will change free water flush to 100ml/hr to provide ~3110 ml free water (~2 ml/kcal). Full nutrition follow-up tomorrow. [address] [place], LD on 2/15/2021 at 12:10 PM 
Contact: [phone]

## 2021-02-15 NOTE — PROGRESS NOTES
Ventilator check complete; patient has a #6XLT tracheostomy. Patient is not sedated. Patient is not able to follow commands. Breath sounds are coarse. Trachea is midline, Negative for subcutaneous air, and chest excursion is symmetric. Patient is also Negative for cyanosis and is Negative for pitting edema. All alarms are set and audible. Resuscitation bag is at the head of the bed. Ventilator Settings Mode FIO2 Rate Tidal Volume Pressure PEEP I:E Ratio VC+  50 %    350 ml  12 cm H2O  8 cm H20  1:1.9 Peak airway pressure: 30 cm H2O Minute ventilation: 11 l/min ABG: No results for input(s): PH, PCO2, PO2, HCO3 in the last 72 hours.  
 
 
[place], RT

## 2021-02-15 NOTE — PROGRESS NOTES
Critical Care Daily Progress Note: 2/15/2021 Admission Date: 12/24/2020 The patient's chart is reviewed and the patient is discussed with the staff. 79 yo female admitted 12/24 with acute respiratory failure secondary to COVID 19.  Pt was treated with Decadron and convalescent plasma. She had PRATIMA and was not treated with Remdesivir.  She was initially on BiPAP but pt had a  cardiac arrest on 12/30 requiring intubation.  She has remained vent dependent and a trach was placed on 1/19. Pt has been treated for an E coli UTII as well as MRSA in her sputum. He developed acute on chronic renal failure and started on dialysis on 1/12 but now off of since 1/25 and there are no plans to restart. Deniz Arnt consulted for PEG but this on hold pending family discussion regarding need for long term care. Pt was seen by Neurology with an abnormal EEG with moderate to severe slowing. Brain MRI 1/26 revealed stable age-related senescent changes and chronic microvascular disease with remote right posterior temporal occipital ischemic insult. No evidence anoxic brain injury per neuro - deep encephalopathy. Has required IV sedation for prolonged periods due to tachypnea. Subjective:  
  Now off continuous sedation. Barely opens eyes when stimulated. Can't focus or follow commands. Wearing mitts and barely moves arms. Current Facility-Administered Medications Medication Dose Route Frequency  potassium chloride 20 mEq in 100 ml IVPB  20 mEq IntraVENous ONCE  
 vancomycin (VANCOCIN) 750 mg in 0.9% sodium chloride 250 mL (VIAL-MATE)  750 mg IntraVENous Q48H  
 insulin glargine (LANTUS) injection 40 Units  40 Units SubCUTAneous QHS  furosemide (LASIX) injection 80 mg  80 mg IntraVENous ONCE  potassium chloride (KAON 10%) 20 mEq/15 mL oral liquid 40 mEq  40 mEq Per NG tube NOW  
 0.9% sodium chloride infusion 250 mL  250 mL IntraVENous PRN  
  guaiFENesin (ROBITUSSIN) 100 mg/5 mL oral liquid 100 mg  100 mg Per NG tube Q4H PRN  
 insulin regular (NOVOLIN R, HUMULIN R) injection   SubCUTAneous Q6H  
 0.9% sodium chloride infusion 250 mL  250 mL IntraVENous PRN  
 albuterol (PROVENTIL VENTOLIN) nebulizer solution 2.5 mg  2.5 mg Nebulization Q6H RT  
 risperiDONE (RisperDAL) tablet 1 mg  1 mg Oral Q12H  
 HYDROmorphone (PF) (DILAUDID) injection 2 mg  2 mg IntraVENous Q3H PRN  
 LORazepam (ATIVAN) injection 2 mg  2 mg IntraVENous Q4H PRN  
 oxyCODONE IR (ROXICODONE) tablet 15 mg  15 mg Per NG tube Q6H  
 cefTRIAXone (ROCEPHIN) 2 g in 0.9% sodium chloride (MBP/ADV) 50 mL MBP  2 g IntraVENous Q24H  
 0.9% sodium chloride infusion 250 mL  250 mL IntraVENous PRN  
 dexmedeTOMidine in 0.9 % NaCl (PRECEDEX) 400 mcg/100 mL (4 mcg/mL) infusion soln  0.1-1.5 mcg/kg/hr IntraVENous TITRATE  hydrALAZINE (APRESOLINE) 20 mg/mL injection 10 mg  10 mg IntraVENous Q6H PRN  
 famotidine (PEPCID) tablet 20 mg  20 mg Oral DAILY  docusate (COLACE) 50 mg/5 mL oral liquid 100 mg  100 mg Per NG tube DAILY PRN  
 heparin (porcine) 1,000 unit/mL injection 5,000 Units  5,000 Units IntraVENous DIALYSIS PRN  
 alcohol 62% (NOZIN) nasal  1 Ampule  1 Ampule Topical Q12H  
 [Held by provider] heparin (porcine) injection 5,000 Units  5,000 Units SubCUTAneous Q8H  
 bisacodyL (DULCOLAX) suppository 10 mg  10 mg Rectal DAILY PRN  
 NUTRITIONAL SUPPORT ELECTROLYTE PRN ORDERS   Does Not Apply PRN  
 hydrALAZINE (APRESOLINE) tablet 10 mg  10 mg Per NG tube TID  dextrose 40% (GLUTOSE) oral gel 1 Tube  15 g Oral PRN  
 glucagon (GLUCAGEN) injection 1 mg  1 mg IntraMUSCular PRN  
 dextrose (D50W) injection syrg 12.5-25 g  25-50 mL IntraVENous PRN  
 albuterol (PROVENTIL HFA, VENTOLIN HFA, PROAIR HFA) inhaler 2 Puff  2 Puff Inhalation Q4H PRN  phenol throat spray (CHLORASEPTIC) 1 Spray  1 Spray Oral PRN  
  lip protectant (BLISTEX) ointment 1 Each  1 Each Topical PRN  
 acetaminophen (TYLENOL) tablet 650 mg  650 mg Oral Q6H PRN  
 sodium chloride (NS) flush 5-40 mL  5-40 mL IntraVENous Q8H  
 sodium chloride (NS) flush 5-40 mL  5-40 mL IntraVENous PRN Review of Systems: cannot obtain due to patient condition Objective:  
 
Vitals:  
 02/15/21 2817 02/15/21 0559 02/15/21 3698 02/15/21 0820 BP: 134/63 (!) 150/67 138/65 Pulse: 78 78 78 83 Resp:    29 Temp:      
SpO2: 100% 100% 100% 100% Weight:      
Height:      
 
 
 
Intake/Output Summary (Last 24 hours) at 2/15/2021 4822 Last data filed at 2/15/2021 5703 Gross per 24 hour Intake 1613.8 ml Output 1975 ml Net -361.2 ml Physical Exam:         
Constitutional:  intubated and mechanically ventilated. EENMT:  Sclera clear, pupils equal, oral mucosa moist 
Respiratory: coarse bilaterally and equal. Currently on AC. Respirations even and not labored Cardiovascular:  RRR with no M,G,R; sinus per telemetry Gastrointestinal:  soft with no evidence of tenderness; positive bowel sounds present Musculoskeletal:  warm with no cyanosis, no lower extremity edema. Arms looks a little edematous Skin:  no jaundice or ecchymosis Neurologic: barely opens eyes when stimulated. Can't follow commands or interacti Psychiatric:  Cannot assess LINES:  Trach, central line, coyle, flexiseal, NG 
 
DRIPS:  TF 
 
CXR:   
 
 
Ventilator Settings Mode FIO2 Rate Tidal Volume Pressure PEEP  
VC+  50 % 24 350 ml  12 cm H2O  8 cm H20 Peak airway pressure: 30 cm H2O Minute ventilation: 11 l/min ABG:  
Recent Labs  
  02/15/21 
0313 02/14/21 
0251 02/13/21 
0320 PHI 7.43 7.41 7.39 PCO2I 57.7* 58.7* 61.7*  
PO2I 55* 74* 91 HCO3I 37.9* 37.2* 37.2*  
  
 
LAB Recent Labs  
  02/15/21 
0753 02/15/21 
0543 02/14/21 
2327 02/14/21 
1739 02/14/21 
1212 GLUCPOC 228* 247* 180* 124* 276* Recent Labs  
  02/15/21 
0421 02/14/21 1536 02/14/21 
0744 02/13/21 
0246 WBC 10.4  --  9.9 12.6* HGB 8.5* 9.1* 6.4* 6.6* HCT 27.0* 29.1* 21.5* 21.7*  
  --  175 158 Recent Labs  
  02/15/21 
0421 02/14/21 
0744 02/13/21 
9738 * 148* 147* K 3.3* 3.6 3.7 * 108* 110* CO2 37* 39* 36* * 352* 146* BUN 91* 97* 115* CREA 1.48* 1.57* 1.92* CA 9.2 9.3 9.4 Assessment:  (Medical Decision Making) Patient Active Problem List  
Diagnosis Code  Diabetes mellitus with hyperosmolarity without hyperglycemic hyperosmolar nonketotic coma ([facility] E11.00  Coronary artery disease involving coronary bypass graft of native heart without angina pectoris I25.810  
 Uncontrolled type 2 diabetes mellitus with hyperglycemia ([facility]) E11.65  
 Essential hypertension I10  
 HLD (hyperlipidemia) E78.5  Chronic kidney disease, stage III (moderate) N18.30  
 Osteopenia M85.80  Vitamin D deficiency E55.9  Gastroesophageal reflux disease without esophagitis K21.9  Other allergic rhinitis J30.89  Peripheral neuropathy G62.9  
 Primary osteoarthritis involving multiple joints M89.49  
 Insomnia G47.00  RLS (restless legs syndrome) G25.81  
 Anemia D64.9  Generalized weakness R53.1  Decreased oral intake R63.8  Acute on chronic renal failure (HCC) N17.9, N18.9  Noncompliance Z91.19  
 Hypoalbuminemia due to protein-calorie malnutrition ([facility] E88.09, E46  
 COVID-19 U07.1  Acute respiratory distress syndrome (ARDS) due to severe acute respiratory syndrome coronavirus 2 (SARS-CoV-2) ([facility]) U07.1, J80  Acute hypoxemic respiratory failure ([facility]) J96.01  
 Cardiac arrest ([facility]) I46.9  
 Encephalopathy G93.40 Plan:  (Medical Decision Making) Hospital Problems  Date Reviewed: 2/5/2021 Codes Class Noted POA Encephalopathy ICD-10-CM: G93.40 ICD-9-CM: 348.30  2/7/2021 Unknown Barely opens eyes with stimulation. Off continuous sedation Cardiac arrest [facility]) ICD-10-CM: I46.9 ICD-9-CM: 427.5  12/30/2020 No  
 No signs of hypoxic brain injury per neurology COVID-19 ICD-10-CM: U07.1 ICD-9-CM: 079.89  12/24/2020 Yes Off isolation * (Principal) Acute respiratory distress syndrome (ARDS) due to severe acute respiratory syndrome coronavirus 2 (SARS-CoV-2) ([facility]) ICD-10-CM: U07.1, J80 
ICD-9-CM: 518.82, 079.89  12/24/2020 No  
 Remains on vent support - PO2 down some today Acute hypoxemic respiratory failure ([facility] [address] ICD-10-CM: J96.01 
ICD-9-CM: 518.81  12/24/2020 Yes As above Acute on chronic renal failure ([facility]) ICD-10-CM: N17.9, N18.9 ICD-9-CM: 584.9, 585.9  9/17/2019 Yes Stable off dialysis Coronary artery disease involving coronary bypass graft of native heart without angina pectoris (Chronic) ICD-10-CM: U94.149 ICD-9-CM: 414.05  6/15/2015 Yes  
 stable Uncontrolled type 2 diabetes mellitus with hyperglycemia (HCC) (Chronic) ICD-10-CM: E11.65 ICD-9-CM: 250.02  6/15/2015 Yes BS 200s to 300s Diabetes mellitus with hyperosmolarity without hyperglycemic hyperosmolar nonketotic coma (HCC) (Chronic) ICD-10-CM: E11.00 ICD-9-CM: 250.20  11/24/2014 Yes 1. Currently on [facility] - retry PS and monitor respiratory rate. PO2 down some today - will repeat lasix 2. Off continuous sedation since 2/11 per chart. Has prn meds available if needed - otherwise on scheduled Roxicodone and Risperdal. Last prn dose of Dilaudid on 2/11. Will decrease scheduled Roxicodone and reassess 3. On waiting list for [facility] 4. Needs PEG - GI saw previously but put on hold pending family discussion. Patient now DNR but family still wants most things done (just not any pressors) 5. Flexiseal with 100 mls output over past 24 hours. Looks tan in color (no evidence of bleeding). Will add Probiotic continue to monitor. On abx which is likely contributing 6. Repeat lasix today - renal function stable despite noted azotemia. Add no BNP - hard to interpret CXR with ARDS. Fluid balance neg 411 mls over past 24 hours 7. Increase lantus for better BS control 8. Adjust free water for hypernatremia - will ask dietary if we should change formula 9. Hemoglobin up but down some from yesterday - transfused over the weekend. Subq heparin on hold due to anemia, stool looks tan in color. Will hemocult and change to PPI if + 
10. Last day of Rocephin for E coli in the urine and Vanc for MRSA in sputum. WBC down, afebrile 11. Repeat labs tomorrow - reassess K+ and hgb More than 50% of the time documented was spent in face-to-face contact with the patient and in the care of the patient on the floor/unit where the patient is located. Yasir Mckinney, CHER Lungs: Coarse sounds with rhonchi. Heart S1 and S2 audible, no murmers or rubs appreciated Other  Angie Puckett with patient's daughter. Patient's daughter indicated patient never really wanted to pursue this, but her other sister wants her to continue and may be an issue of secondary gain. Please note at this time she would like to hold off giving any further transfusions to the patient. Per nursing patient did get 2 transfusions last night. Given overall poor prognosis I concur. Will hold off further lab testing or repeating any more transfusions the patient at this time. Patient also with marked tachypnea and looks uncomfortable. We will give her morphine, she has as needed orders. Patient has been here since December and not improving. She is currently a DNR. We will see if we can get the  to come for the family. I have spoken with and examined the patient. I have reviewed the history, examination, assessment, and plan and agree with the above. Dereje Matthews MD 
 
 
This note was signed electronically. Errors are unfortunately her likely due to dictation software.

## 2021-02-15 NOTE — PROGRESS NOTES
Bedside shift report given to oncoming nurse(Elvia RN) by offgoing RN (huan). Night shift nurse mentioned evening physician was notified about white and blue ports of quad lumen are not working. Physician told night shift nurse to not be concerned about non working ports.

## 2021-02-15 NOTE — PROGRESS NOTES
Contacted RT due to patient breathing over vent, increased HR and SBP(150-160s). Patient on precedex and increased to 1mcg/kg. Patient has received 2mg of dilaudid earlier today when Pulmonology rounded. Patient on scheduled oxycodone and just received 1mg of Ativan. Patient most recent /67. Patient is restless and continues to need sedation to comply with vent settings and keep sbp below 160 and hr under 100.

## 2021-02-15 NOTE — PROGRESS NOTES
Problem: Delirium Treatment Goal: *Level of consciousness restored to baseline Outcome: Not Progressing Towards Goal 
Goal: *Functional assessment restored to baseline Outcome: Not Progressing Towards Goal 
Goal: *Cognitive status will be restored to baseline Outcome: Not Progressing Towards Goal 
  
Problem: Ventilator Management Goal: *Adequate oxygenation and ventilation Outcome: Not Progressing Towards Goal 
Goal: *Absence of infection signs and symptoms Outcome: Not Progressing Towards Goal

## 2021-02-15 NOTE — PROGRESS NOTES
Ventilator check complete; patient has a #6XLT tracheostomy. Patient is not sedated. Patient is not able to follow commands. Breath sounds are coarse. Trachea is midline, Negative for subcutaneous air, and chest excursion is symmetric. Patient is also Negative for cyanosis and is Negative for pitting edema. All alarms are set and audible. Resuscitation bag is at the head of the bed. Ventilator Settings Mode FIO2 Rate Tidal Volume Pressure PEEP I:E Ratio VC+  40 %   24 bpm 350 ml    8 cm H20  1:1.9 Peak airway pressure: 34 cm H2O Minute ventilation: 14.4 l/min ABG:  
 
RT Carmen

## 2021-02-15 NOTE — PROGRESS NOTES
Wound nurse came to see patient; patient too restless and breathing over vent. This nurse asked wound nurse to come back later once patient is properly medicated to handle turn and wound care. Replaced sacral dressing at 1800; wound nurse did not return to change and evaluate patient.

## 2021-02-15 NOTE — WOUND CARE
Attempt to see patient for anal/ sacral area, patient is on ventilator and per nurse not appropriate to turn at this time as she has tachypnea and is very restless. Patient is criticaly ill, with trach and NG tube, post COVID+ and with  rectal tube  Nurse reports bordered silicone foam is in use over sacrum. Small open area over right elbow assessed, stage 2 use foam dressing every other day . Erythema over 1st MTP on  Each foot and lateral ankles, lightly blanchable, foam dressing in use and heel boots in use would continue. Will attempt full consult later today or tomorrow as time allows.

## 2021-02-15 NOTE — PROGRESS NOTES
Pharmacokinetic Consult to Pharmacist 
 
Arlin Ayala is a 78 y.o. female being treated with vancomycin. Height: 5' 5\" (165.1 cm)  Weight: 71.3 kg (157 lb 3 oz) Lab Results Component Value Date/Time BUN 91 (H) 02/15/2021 04:21 AM  
 Creatinine 1.48 (H) 02/15/2021 04:21 AM  
 WBC 10.4 02/15/2021 04:21 AM  
 Procalcitonin 1.52 01/05/2021 07:39 AM  
 Lactic acid 1.4 12/24/2020 03:27 PM  
 Lactic Acid (POC) 1.55 09/16/2019 11:40 AM  
  
Estimated Creatinine Clearance: 29 mL/min (A) (based on SCr of 1.48 mg/dL (H)). Lab Results Component Value Date/Time Vancomycin,trough 15.3 09/06/2019 06:45 AM  
 Vancomycin, random 18.8 02/15/2021 04:21 AM  
 
 
Day 6 of vancomycin. Goal trough is 15-20. Random level is within goal range. Will schedule 750 mg every 48 hours for now. Pharmacy will continue to follow. Please call with any questions. Thank you, Tasneem Rivera, PharmD, [facility] Clinical Pharmacy Specialist 
[phone]

## 2021-02-16 NOTE — PROGRESS NOTES
Problem: Falls - Risk of 
Goal: *Absence of Falls Description: Document Gene Powell Fall Risk and appropriate interventions in the flowsheet. Outcome: Progressing Towards Goal 
Note: Fall Risk Interventions: 
Mobility Interventions: Bed/chair exit alarm Mentation Interventions: Adequate sleep, hydration, pain control Medication Interventions: Evaluate medications/consider consulting pharmacy Elimination Interventions: Toileting schedule/hourly rounds History of Falls Interventions: Room close to nurse's station Problem: Patient Education: Go to Patient Education Activity Goal: Patient/Family Education Outcome: Progressing Towards Goal 
  
Problem: Pressure Injury - Risk of 
Goal: *Prevention of pressure injury Description: Document Ashu Scale and appropriate interventions in the flowsheet. Outcome: Progressing Towards Goal 
Note: Pressure Injury Interventions: 
Sensory Interventions: Assess changes in LOC Moisture Interventions: Absorbent underpads Activity Interventions: Pressure redistribution bed/mattress(bed type) Mobility Interventions: Float heels Nutrition Interventions: Document food/fluid/supplement intake Friction and Shear Interventions: Apply protective barrier, creams and emollients Problem: Patient Education: Go to Patient Education Activity Goal: Patient/Family Education Outcome: Progressing Towards Goal 
  
Problem: Diabetes Self-Management Goal: *Disease process and treatment process Description: Define diabetes and identify own type of diabetes; list 3 options for treating diabetes. Outcome: Progressing Towards Goal 
Goal: *Incorporating nutritional management into lifestyle Description: Describe effect of type, amount and timing of food on blood glucose; list 3 methods for planning meals. Outcome: Progressing Towards Goal 
Goal: *Incorporating physical activity into lifestyle Description: State effect of exercise on blood glucose levels. Outcome: Progressing Towards Goal 
Goal: *Developing strategies to promote health/change behavior Description: Define the ABC's of diabetes; identify appropriate screenings, schedule and personal plan for screenings. Outcome: Progressing Towards Goal 
Goal: *Using medications safely Description: State effect of diabetes medications on diabetes; name diabetes medication taking, action and side effects. Outcome: Progressing Towards Goal 
Goal: *Monitoring blood glucose, interpreting and using results Description: Identify recommended blood glucose targets  and personal targets. Outcome: Progressing Towards Goal 
Goal: *Prevention, detection, treatment of acute complications Description: List symptoms of hyper- and hypoglycemia; describe how to treat low blood sugar and actions for lowering  high blood glucose level. Outcome: Progressing Towards Goal 
Goal: *Prevention, detection and treatment of chronic complications Description: Define the natural course of diabetes and describe the relationship of blood glucose levels to long term complications of diabetes. Outcome: Progressing Towards Goal 
Goal: *Developing strategies to address psychosocial issues Description: Describe feelings about living with diabetes; identify support needed and support network Outcome: Progressing Towards Goal 
Goal: *Insulin pump training Outcome: Progressing Towards Goal 
Goal: *Sick day guidelines Outcome: Progressing Towards Goal 
Goal: *Patient Specific Goal (EDIT GOAL, INSERT TEXT) Outcome: Progressing Towards Goal 
  
Problem: Patient Education: Go to Patient Education Activity Goal: Patient/Family Education Outcome: Progressing Towards Goal 
  
Problem: Delirium Treatment Goal: *Level of consciousness restored to baseline Outcome: Progressing Towards Goal 
Goal: *Level of environmental perceptions restored to baseline Outcome: Progressing Towards Goal 
Goal: *Sensory perception restored to baseline Outcome: Progressing Towards Goal 
Goal: *Emotional stability restored to baseline Outcome: Progressing Towards Goal 
Goal: *Functional assessment restored to baseline Outcome: Progressing Towards Goal 
Goal: *Absence of falls Outcome: Progressing Towards Goal 
Goal: *Will remain free of delirium, CAM Score negative Outcome: Progressing Towards Goal 
Goal: *Cognitive status will be restored to baseline Outcome: Progressing Towards Goal 
Goal: Interventions Outcome: Progressing Towards Goal 
  
Problem: Patient Education: Go to Patient Education Activity Goal: Patient/Family Education Outcome: Progressing Towards Goal 
  
Problem: Non-Violent Restraints Goal: *Removal from restraints as soon as assessed to be safe Outcome: Progressing Towards Goal 
Goal: *No harm/injury to patient while restraints in use Outcome: Progressing Towards Goal 
Goal: *Patient's dignity will be maintained Outcome: Progressing Towards Goal 
Goal: *Patient Specific Goal (EDIT GOAL, INSERT TEXT) Outcome: Progressing Towards Goal 
Goal: Non-violent Restaints:Standard Interventions Outcome: Progressing Towards Goal 
Goal: Non-violent Restraints:Patient Interventions Outcome: Progressing Towards Goal 
Goal: Patient/Family Education Outcome: Progressing Towards Goal 
  
Problem: Ventilator Management Goal: *Adequate oxygenation and ventilation Outcome: Progressing Towards Goal 
Goal: *Patient maintains clear airway/free of aspiration Outcome: Progressing Towards Goal 
Goal: *Absence of infection signs and symptoms Outcome: Progressing Towards Goal 
Goal: *Normal spontaneous ventilation Outcome: Progressing Towards Goal 
  
Problem: Patient Education: Go to Patient Education Activity Goal: Patient/Family Education Outcome: Progressing Towards Goal

## 2021-02-16 NOTE — PROGRESS NOTES
Ventilator check complete; patient has a #6 tracheostomy. Patient is sedated. Patient is not able to follow commands. Breath sounds are coarse. Trachea is midline, Negative for subcutaneous air, and chest excursion is symmetric. Patient is also Negative for cyanosis and is Negative for pitting edema. All alarms are set and audible. Resuscitation bag is at the head of the bed. Ventilator Settings Mode FIO2 Rate Tidal Volume Pressure PEEP I:E Ratio VC+  50 %    350 ml  12 cm H2O  8 cm H20  1:1.9 Peak airway pressure: 35 cm H2O Minute ventilation: 13.1 l/min Sita Chaudhary, RT

## 2021-02-16 NOTE — PROGRESS NOTES
Bedside shift report given to [employer] (oncoming) from [employer] (offgoing ). Informed nurse of patient family dynamics, patient restlessness and sedation being increased to get patient comfortable.

## 2021-02-16 NOTE — PROGRESS NOTES
45 minute phone call with daughter Jose Stern. Educated on plan of care and presented facts to daughter. Expressed the focus needs to be on the patient and not dilemma between sisters. Daughter states she was not educated on peg tube, decreasing red blood cells, ventilation settings, etc. This nurse allowed for family member to express their concerns with patient care and specific staff members; re orientated family member that patient and physicians have expressed options to both siblings and both siblings need to come to an agreement and focus on the patients well-being and what they would want. Daughter Rosette Kocher, who comes to see patient everyday has explained updates to sister Jose Stern. Educated Jose Stern that patient is spiking fevers again and would likely not receive peg tube due to underlying fevers and decreasing red blood cells. Daughter Jose Stern needed frequent re-orientating that family dynamics outside of hospital are affecting patient care and encouraged siblings to come to an agreement for patient well-being.

## 2021-02-16 NOTE — PROGRESS NOTES
Critical Care Daily Progress Note: 2/16/2021 Admission Date: 12/24/2020 The patient's chart is reviewed and the patient is discussed with the staff. 79 yo female admitted 12/24 with acute respiratory failure secondary to COVID 19.  Pt was treated with Decadron and convalescent plasma. She had PRATIMA and was not treated with Remdesivir.  She was initially on BiPAP but pt had a  cardiac arrest on 12/30 requiring intubation.  She has remained vent dependent and a trach was placed on 1/19. Pt has been treated for an E coli UTII as well as MRSA in her sputum. He developed acute on chronic renal failure and started on dialysis on 1/12 but now off of since 1/25 and there are no plans to restart. Arlin Paniagua consulted for PEG but this on hold pending family discussion regarding need for long term care. Pt was seen by Neurology with an abnormal EEG with moderate to severe slowing. Brain MRI 1/26 revealed stable age-related senescent changes and chronic microvascular disease with remote right posterior temporal occipital ischemic insult. No evidence anoxic brain injury per neuro - deep encephalopathy. Has required IV sedation for prolonged periods due to tachypnea. Subjective:  
  Got IV Dilaudid overnight to help control respiratory rate. Also on Precedex infusion. Respiratory rate currently in the low 40s and she looks short of breath. Current Facility-Administered Medications Medication Dose Route Frequency  insulin glargine (LANTUS) injection 40 Units  40 Units SubCUTAneous QHS  Saccharomyces boulardii (FLORASTOR) capsule 250 mg  250 mg Per NG tube BID  
 oxyCODONE IR (ROXICODONE) tablet 10 mg  10 mg Per NG tube Q6H  
 LORazepam (ATIVAN) injection 1 mg  1 mg IntraVENous Q6H PRN  
 risperiDONE (RisperDAL) 1 mg/mL oral solution soln 1 mg  1 mg Nasogastric Q12H  
 0.9% sodium chloride infusion 250 mL  250 mL IntraVENous PRN  
 • guaiFENesin (ROBITUSSIN) 100 mg/5 mL oral liquid 100 mg  100 mg Per NG tube Q4H PRN  
• insulin regular (NOVOLIN R, HUMULIN R) injection   SubCUTAneous Q6H  
• 0.9% sodium chloride infusion 250 mL  250 mL IntraVENous PRN  
• albuterol (PROVENTIL VENTOLIN) nebulizer solution 2.5 mg  2.5 mg Nebulization Q6H RT  
• HYDROmorphone (PF) (DILAUDID) injection 2 mg  2 mg IntraVENous Q3H PRN  
• 0.9% sodium chloride infusion 250 mL  250 mL IntraVENous PRN  
• dexmedeTOMidine in 0.9 % NaCl (PRECEDEX) 400 mcg/100 mL (4 mcg/mL) infusion soln  0.1-1.5 mcg/kg/hr IntraVENous TITRATE  
• hydrALAZINE (APRESOLINE) 20 mg/mL injection 10 mg  10 mg IntraVENous Q6H PRN  
• famotidine (PEPCID) tablet 20 mg  20 mg Oral DAILY  
• docusate (COLACE) 50 mg/5 mL oral liquid 100 mg  100 mg Per NG tube DAILY PRN  
• heparin (porcine) 1,000 unit/mL injection 5,000 Units  5,000 Units IntraVENous DIALYSIS PRN  
• alcohol 62% (NOZIN) nasal  1 Ampule  1 Ampule Topical Q12H  
• [Held by provider] heparin (porcine) injection 5,000 Units  5,000 Units SubCUTAneous Q8H  
• bisacodyL (DULCOLAX) suppository 10 mg  10 mg Rectal DAILY PRN  
• NUTRITIONAL SUPPORT ELECTROLYTE PRN ORDERS   Does Not Apply PRN  
• hydrALAZINE (APRESOLINE) tablet 10 mg  10 mg Per NG tube TID  
• dextrose 40% (GLUTOSE) oral gel 1 Tube  15 g Oral PRN  
• glucagon (GLUCAGEN) injection 1 mg  1 mg IntraMUSCular PRN  
• dextrose (D50W) injection syrg 12.5-25 g  25-50 mL IntraVENous PRN  
• albuterol (PROVENTIL HFA, VENTOLIN HFA, PROAIR HFA) inhaler 2 Puff  2 Puff Inhalation Q4H PRN  
• phenol throat spray (CHLORASEPTIC) 1 Spray  1 Spray Oral PRN  
• lip protectant (BLISTEX) ointment 1 Each  1 Each Topical PRN  
• acetaminophen (TYLENOL) tablet 650 mg  650 mg Oral Q6H PRN  
• sodium chloride (NS) flush 5-40 mL  5-40 mL IntraVENous Q8H  
• sodium chloride (NS) flush 5-40 mL  5-40 mL IntraVENous PRN  
 
 
Review of Systems: cannot obtain due to patient condition 
 
 
Objective:  
 
 Vitals:  
 02/16/21 0600 02/16/21 0659 02/16/21 0747 02/16/21 0753 BP: (!) 126/58 119/60 Pulse: 83 68 68 72 Resp: (!) 36 (!) 34 (!) 36 (!) 32 Temp:      
SpO2: 99% 98% 100% 100% Weight:      
Height:      
 
 
 
Physical Exam:         
Constitutional:  intubated and mechanically ventilated. EENMT:  Sclera clear, pupils equal. Barely opens eyes with stimulation Respiratory: coarse bilaterally and equal. Currently on AC. Respirations moderately labored with tachypnea Cardiovascular:  RRR with no M,G,R; sinus per telemetry Gastrointestinal:  soft with no evidence of tenderness; positive bowel sounds present Musculoskeletal:  warm with no cyanosis, no lower extremity edema. Arms looks a little edematous Skin:  no jaundice or ecchymosis Neurologic: barely opens eyes when stimulated. Can't follow commands or interact Psychiatric:  Cannot assess LINES:  Trach, central line, coyle, flexiseal, NG 
 
DRIPS:  TF 
 
CXR:   
 
 
Ventilator Settings Mode FIO2 Rate Tidal Volume Pressure PEEP  
VC+  50 % 24 350 ml  12 cm H2O  8 cm H20 Peak airway pressure: 29 cm H2O Minute ventilation: 11.4 l/min ABG:  
Recent Labs  
  02/16/21 
0415 02/15/21 
0313 02/14/21 
0251 PHI 7.38 7.43 7.41  
PCO2I 65.0* 57.7* 58.7* PO2I 122* 55* 74* HCO3I 38.6* 37.9* 37.2*  
  
 
LAB Recent Labs  
  02/15/21 
2239 02/15/21 
1738 02/15/21 
1133 02/15/21 
0753 02/15/21 
0543 GLUCPOC 210* 212* 280* 228* 247* Recent Labs  
  02/16/21 
0404 02/15/21 
0421 02/14/21 
1649 02/14/21 
4594 WBC 11.8* 10.4  --  9.9 HGB 9.1* 8.5* 9.1* 6.4* HCT 29.1* 27.0* 29.1* 21.5*  
 170  --  175 Recent Labs  
  02/16/21 
0404 02/15/21 
0421 02/14/21 
9146  147* 148* K 4.4 3.3* 3.6  108* 108* CO2 38* 37* 39* * 223* 352* BUN 84* 91* 97* CREA 1.43* 1.48* 1.57* CA 9.5 9.2 9.3 Assessment:  (Medical Decision Making) Patient Active Problem List  
Diagnosis Code  Diabetes mellitus with hyperosmolarity without hyperglycemic hyperosmolar nonketotic coma ([facility] E11.00  Coronary artery disease involving coronary bypass graft of native heart without angina pectoris I25.810  
 Uncontrolled type 2 diabetes mellitus with hyperglycemia ([facility]) E11.65  
 Essential hypertension I10  
 HLD (hyperlipidemia) E78.5  Chronic kidney disease, stage III (moderate) N18.30  
 Osteopenia M85.80  Vitamin D deficiency E55.9  Gastroesophageal reflux disease without esophagitis K21.9  Other allergic rhinitis J30.89  Peripheral neuropathy G62.9  
 Primary osteoarthritis involving multiple joints M89.49  
 Insomnia G47.00  RLS (restless legs syndrome) G25.81  
 Anemia D64.9  Generalized weakness R53.1  Decreased oral intake R63.8  Acute on chronic renal failure (HCC) N17.9, N18.9  Noncompliance Z91.19  
 Hypoalbuminemia due to protein-calorie malnutrition ([facility] E88.09, E46  
 COVID-19 U07.1  Acute respiratory distress syndrome (ARDS) due to severe acute respiratory syndrome coronavirus 2 (SARS-CoV-2) ([facility]) U07.1, J80  Acute hypoxemic respiratory failure ([facility]) J96.01  
 Cardiac arrest ([facility]) I46.9  
 Encephalopathy G93.40 Plan:  (Medical Decision Making) Hospital Problems  Date Reviewed: 2/5/2021 Codes Class Noted POA Encephalopathy ICD-10-CM: G93.40 ICD-9-CM: 348.30  2/7/2021 Unknown Barely opens eyes with stimulation. Back on precedex to help control resp rate Cardiac arrest [facility]) ICD-10-CM: I46.9 ICD-9-CM: 427.5  12/30/2020 No  
 No signs of hypoxic brain injury per neurology COVID-19 ICD-10-CM: U07.1 ICD-9-CM: 079.89  12/24/2020 Yes Off isolation  * (Principal) Acute respiratory distress syndrome (ARDS) due to severe acute respiratory syndrome coronavirus 2 (SARS-CoV-2) ([facility]) ICD-10-CM: U07.1, J80 
ICD-9-CM: 518.82, 079.89  12/24/2020 No  
  Remains on vent support - PO2 better today. High peak pressure with ventilation (around 36)  
 Acute hypoxemic respiratory failure (HCC) ICD-10-CM: J96.01 
ICD-9-CM: 518.81  12/24/2020 Yes  
 As above  
 Acute on chronic renal failure (HCC) ICD-10-CM: N17.9, N18.9 
ICD-9-CM: 584.9, 585.9  9/17/2019 Yes  
 Stable off dialysis  
 Coronary artery disease involving coronary bypass graft of native heart without angina pectoris (Chronic) ICD-10-CM: I25.810 
ICD-9-CM: 414.05  6/15/2015 Yes  
 stable  
 Uncontrolled type 2 diabetes mellitus with hyperglycemia (HCC) (Chronic) ICD-10-CM: E11.65 
ICD-9-CM: 250.02  6/15/2015 Yes  
 BS low 200s   
 Diabetes mellitus with hyperosmolarity without hyperglycemic hyperosmolar nonketotic coma (HCC) (Chronic) ICD-10-CM: E11.00 
ICD-9-CM: 250.20  11/24/2014 Yes  
   
  
1. Currently on AC - resp rate low 40s. Tried PC (high peak pressures) but did not look any more comfortable so left on AC. TV only 350 mls due to high pressures. Will need to adjust sedation. Risperdal increased on 2/15 - increase prn.  
2. Back on continuous infusion of Precedex now due to tachypnea. Also got prn Dilaudid overnight.  Has prn meds available if needed - otherwise on scheduled Roxicodone and Risperdal.  Will decrease scheduled Roxicodone and reassess - will increase Roxicodone back since she is requiring more prn Dilaudid now for tachypnea 
3. On waiting list for [facility] 
4. Needs PEG - GI saw previously but put on hold pending family discussion. Patient now DNR but family still wants most things done (just not any pressors or further blood transfusions).  
5. Flexiseal with 400 mls output over past 24 hours. Looks tan in color (no evidence of bleeding).  Added Probiotic yesterday.   Off abx as of yesterday. Continue to monitor. 
 6. Repeated lasix yesterday but fluid balance still + 1.2 liters - renal function stable despite noted azotemia. BNP canceled yesterday - last one was 11,000. Hard to interpret CXR with ARDS. High peak pressures on vent. Repeat lasix today. 7. Increased lantus yesterday for better BS control - BS low 200s. 8. Free water adjusted yesterday and Na down 9. Hemoglobin stable. Transfused over the weekend. Subq heparin on hold due to anemia, stool looks tan in color. Hemocult canceled yesterday after family discussion. Will change Pepcid to Protonix empirically. 10. Completed abx yesterday 11. Change labs to M, W, F More than 50% of the time documented was spent in face-to-face contact with the patient and in the care of the patient on the floor/unit where the patient is located. Denise Warren, NP Lungs: b/l rhonchi Heart S1 and S2 audible, no murmers or rubs appreciated Other Looks unwell and more restless. Did adjust vent to TV now 400 and increased peep and decreased I-time and still RR in 40's To get dilaudid now. Overall doing poorly and will not survive. See noted from yesterday with daughter and  Hg stable and no more transfusions/heroic escalation of care. Will check labs M,W, F Prognosis is overall poor and likely terminal. 
DNR I have spoken with and examined the patient. I have reviewed the history, examination, assessment, and plan and agree with the above. Katie Wagoner MD 
 
 
This note was signed electronically. Errors are unfortunately her likely due to dictation software.

## 2021-02-16 NOTE — PROGRESS NOTES
Ventilator check complete; patient has a #6.0 tracheostomy. Patient is sedated. Patient is not able to follow commands. Breath sounds are coarse and diminished. Trachea is midline, Negative for subcutaneous air, and chest excursion is symmetric. Patient is also Negative for cyanosis. All alarms are set and audible. Resuscitation bag is at the head of the bed. Ventilator Settings Mode FIO2 Rate Tidal Volume Pressure PEEP I:E Ratio VC+  50 %  24  350 ml    8 cm H20  1:1.2 Peak airway pressure: 29 cm H2O Minute ventilation: 11.4 l/min Toro Wilks RT

## 2021-02-16 NOTE — PROGRESS NOTES
Bedside, Verbal and Written shift change report given to Jacque Alex RN (oncoming nurse) by Gabrielle Alves RN (offgoing nurse). Report included the following information SBAR, Kardex, Intake/Output, MAR, Recent Results, Cardiac Rhythm SB/NSR with PVCs and Alarm Parameters .

## 2021-02-16 NOTE — PROGRESS NOTES
Care Management Interventions PCP Verified by CM: Kathy Foote MD) Mode of Transport at Discharge: Other (see comment)(To [email or web address]) Transition of Care Consult (CM Consult): Discharge Planning, LTAC Discharge Durable Medical Equipment: No 
Physical Therapy Consult: Yes Occupational Therapy Consult: Yes Speech Therapy Consult: Yes Current Support Network: Family Lives Nearby(Pt lives in rented trailer. Uday Lim, her daughter, has lived with her intermittently) Confirm Follow Up Transport: Family The Plan for Transition of Care is Related to the Following Treatment Goals : LTAC The Patient and/or Patient Representative was Provided with a Choice of Provider and Agrees with the Discharge Plan?: Yes Name of the Patient Representative Who was Provided with a Choice of Provider and Agrees with the Discharge Plan: Daughters, [facility] and Uday Lim Essie of Choice List was Provided with Basic Dialogue that Supports the Patient's Individualized Plan of Care/Goals, Treatment Preferences and Shares the Quality Data Associated with the Providers?: Yes [employer] Information Provided?: No 
Discharge Location Discharge Placement: [address] [place] [address] CM met with pt's daughter, Kacey Montoya, yesterday in pt's room. CM offered support while she spoke with Dr Saad Barfield. She verbalizes continued heartbreak watching her mom \"suffer\". CM reached out to pt's other daughter, Ronen Coburn, today Michelle Marquez ([phone]) to offer a visit during visiting hours. This CM updated Ronen Coburn on pt's status. CM also informed her that [employer] requested additional documentation for pt's insurance. This for prior auth. CM informed Ronen Coburn that pt should be nearing the top of the list for [employer]. CM informed her that there is no visiting on [employer] now just face time and phone calls from the nurses. Ronen Coburn spoke to this CM in a lengthy  conversation describing to this CM that she has received assistance from nurses that have cared for her mother to help her understand her mom's status. She states her mom would not want a PEG tube. She informs this CM that her daughter, who was raised by her mom, is still not prepared to change course of care to comfort measures even though, Ronen Coburn feels like she is closer to making that decision. CM ended the call offering Ronen Coburn time with her mom today or her adult children. She states she will discuss with her children. She states she doesn't like seeing her mom like this.   
CM to continue to support and assist.

## 2021-02-16 NOTE — PROGRESS NOTES
Comprehensive Nutrition Assessment Type and Reason for Visit: César Louis Tube Feeding Management (Pulmonary) Nutrition Recommendations/Plan:  
· Continue EN of Nepro via NGT at 35 ml/hr. · Prosource TF x 1 packet per day with 50 ml water before and after. · Decrease free water flush to 50 ml/hr. New water flush + TF + flushes with Prosource to provide ~1870 ml free water (~1.2 ml/kcal) Malnutrition Assessment: 
Malnutrition Status: Insufficient data Nutrition Assessment:  
Nutrition History: 12/31: 3 recorded meals in past 6 days with average intake of 83% Nutrition Background: H/O: CAD, HTN, DM, HLD, peripheral neuropathy, CKD stage III. Presented with dyspnea. Findings of +COVID. Admitted to ICU with ARDS, severe acute hypoxic due to COVID PNA, PRATIMA on CKD. Was requiring BIPAP at night and Aviro during day. Had brief cardiac arrest and was intubated 12/30 Daily Update: 
Patient seen and discussed with RN. She continues to require Lasix. Na WNL limits today. Has been of Zosyn since 2/11. GOC continue to be discussed. Wound care assessment yesterday with noted stage 2 pressure ulcer to elbow, unable to assess sacrum as patient was too unstable. Abdominal Status (last documented): Soft abdomen with Active  bowel sounds. Last BM 02/15/21. Pertinent Medications: Precedex, Pepcid, Lasix 80 mg BID, Lantus, SSI, Florastor, Heparin on hold, Colace and Dulcolax PRN Pertinent Labs: Na 142, K 4.4, Glucose 219, BUN/Creatinine 84/1.43, Ca 9.5 Generalized: No Edema (2/15/2021  8:00 PM) Nutrition Related Findings:  
Intubated and OGT placed 12/30. CRRT initiated on 1/12. TF formula changed to a renal formula on 1/11. s/p Trach 1/19, NG placed 1/19. NGT replaced 1/29. Wound Type: Stage II(to elbow) Current Nutrition Therapies: DIET NPO 
DIET TUBE FEEDING Administer 1 pouches Prosource TF via open syringe into FT @ 1800 daily. Flush FT with 50 ml water before and after administration of protein. Current Tube Feeding (TF) Orders: · Feeding Route: Nasogastric · Formula: Nepro · Schedule:Continuous · Regimen: 35 ml/hr · Additives/Modulars: Protein(1 packet prosource TF per day with 50ml water bofore/after) · Water Flushes: 100 ml/hr · Current TF & Flush Orders Provides: at goal 
· Goal TF & Flush Orders Provides: 1552 kcal (100% needs), 79 g pro (100% needs), 3110 ml free water (~2 ml/kcal for hypernatremia) Current Intake: Average Meal Intake: NPO Average Supplement Intake: NPO Additional Caloric Sources: ( ) Anthropometric Measures: 
Height: 5' 5\" (165.1 cm) Current Body Wt: 78.6 kg (173 lb 4.5 oz)(2/16), Weight source: Bed scale BMI: 28.8, Overweight (BMI 25.0-29. 9) Admission Body Weight: 159 lb 2.8 oz(Bedscale) Ideal Body Weight (lbs) (Calculated): 125 lbs (57 kg), 123.8 % Usual Body Wt: 73.5 kg (162 lb)(Per EMR 11/5/82020), Percent weight change: -4.5 Estimated Daily Nutrient Needs: 
Energy (kcal/day): 1809-2242 (Kcal/kg(25-30), Weight Used: Ideal(56.8)) Protein (g/day): 68-86 (1.2-1.5 g/kg) Weight Used: (Ideal) Fluid (ml/day):   (Standard renal) Nutrition Diagnosis:  
· Inadequate oral intake related to impaired respiratory function as evidenced by (intubated, NPO, TF to meet needs) Nutrition Interventions:  
Food and/or Nutrient Delivery: Continue NPO, Modify tube feeding Coordination of Nutrition Care: Continue to monitor while inpatient Plan of Care discussed with Rg Blanco Goals:  
Previous Goal Met: Goal(s) achieved Active Goal: Maintain TF to meet estimated needs Nutrition Monitoring and Evaluation:  
  
Food/Nutrient Intake Outcomes: Enteral nutrition intake/tolerance Physical Signs/Symptoms Outcomes: Biochemical data, GI status Discharge Planning: Too soon to determine Alejandra Shinnston [place], LD on 2/16/2021 at 11:53 AM 
Contact: [phone] Disaster Mode active

## 2021-02-17 NOTE — PROGRESS NOTES
Critical Care Daily Progress Note: 2/17/2021 Admission Date: 12/24/2020 The patient's chart is reviewed and the patient is discussed with the staff. 79 yo female admitted 12/24 with acute respiratory failure secondary to COVID 19.  Pt was treated with Decadron and convalescent plasma. She had PRATIMA and was not treated with Remdesivir.  She was initially on BiPAP but pt had a  cardiac arrest on 12/30 requiring intubation.  She has remained vent dependent and a trach was placed on 1/19. Pt has been treated for an E coli UTII as well as MRSA in her sputum. He developed acute on chronic renal failure and started on dialysis on 1/12 but now off of since 1/25 and there are no plans to restart. Meche Ascencio consulted for PEG but this on hold pending family discussion regarding need for long term care. Pt was seen by Neurology with an abnormal EEG with moderate to severe slowing. Brain MRI 1/26 revealed stable age-related senescent changes and chronic microvascular disease with remote right posterior temporal occipital ischemic insult. No evidence anoxic brain injury per neuro - deep encephalopathy. Has required IV sedation for prolonged periods due to tachypnea. Subjective:  
   Not able to interact. Continues to have problem with tachypnea and nurse using prn Dilaudid and Precedex infusion to help control respiratory rate. Current Facility-Administered Medications Medication Dose Route Frequency  pantoprazole (PROTONIX) 80 mg in 0.9% sodium chloride 20 mL injection  80 mg IntraVENous Q12H  furosemide (LASIX) injection 80 mg  80 mg IntraVENous Q12H  
 oxyCODONE IR (ROXICODONE) tablet 15 mg  15 mg Per NG tube Q6H  
 insulin glargine (LANTUS) injection 40 Units  40 Units SubCUTAneous QHS  Saccharomyces boulardii (FLORASTOR) capsule 250 mg  250 mg Per NG tube BID  LORazepam (ATIVAN) injection 1 mg  1 mg IntraVENous Q6H PRN  
  risperiDONE (RisperDAL) 1 mg/mL oral solution soln 1 mg  1 mg Nasogastric Q12H  
 guaiFENesin (ROBITUSSIN) 100 mg/5 mL oral liquid 100 mg  100 mg Per NG tube Q4H PRN  
 insulin regular (NOVOLIN R, HUMULIN R) injection   SubCUTAneous Q6H  
 albuterol (PROVENTIL VENTOLIN) nebulizer solution 2.5 mg  2.5 mg Nebulization Q6H RT  
 HYDROmorphone (PF) (DILAUDID) injection 2 mg  2 mg IntraVENous Q3H PRN  
 dexmedeTOMidine in 0.9 % NaCl (PRECEDEX) 400 mcg/100 mL (4 mcg/mL) infusion soln  0.1-1.5 mcg/kg/hr IntraVENous TITRATE  hydrALAZINE (APRESOLINE) 20 mg/mL injection 10 mg  10 mg IntraVENous Q6H PRN  
 docusate (COLACE) 50 mg/5 mL oral liquid 100 mg  100 mg Per NG tube DAILY PRN  
 heparin (porcine) 1,000 unit/mL injection 5,000 Units  5,000 Units IntraVENous DIALYSIS PRN  
 alcohol 62% (NOZIN) nasal  1 Ampule  1 Ampule Topical Q12H  
 [Held by provider] heparin (porcine) injection 5,000 Units  5,000 Units SubCUTAneous Q8H  
 bisacodyL (DULCOLAX) suppository 10 mg  10 mg Rectal DAILY PRN  
 NUTRITIONAL SUPPORT ELECTROLYTE PRN ORDERS   Does Not Apply PRN  
 hydrALAZINE (APRESOLINE) tablet 10 mg  10 mg Per NG tube TID  dextrose 40% (GLUTOSE) oral gel 1 Tube  15 g Oral PRN  
 glucagon (GLUCAGEN) injection 1 mg  1 mg IntraMUSCular PRN  
 dextrose (D50W) injection syrg 12.5-25 g  25-50 mL IntraVENous PRN  
 albuterol (PROVENTIL HFA, VENTOLIN HFA, PROAIR HFA) inhaler 2 Puff  2 Puff Inhalation Q4H PRN  phenol throat spray (CHLORASEPTIC) 1 Spray  1 Spray Oral PRN  
 lip protectant (BLISTEX) ointment 1 Each  1 Each Topical PRN  
 acetaminophen (TYLENOL) tablet 650 mg  650 mg Oral Q6H PRN  
 sodium chloride (NS) flush 5-40 mL  5-40 mL IntraVENous Q8H  
 sodium chloride (NS) flush 5-40 mL  5-40 mL IntraVENous PRN Review of Systems: cannot obtain due to patient condition Objective:  
 
Vitals:  
 02/17/21 0530 02/17/21 0545 02/17/21 0600 02/17/21 8504 BP:   (!) 167/80 (!) 146/81 Pulse: 85 86 81 78 Resp: (!) 32 (!) 32 (!) 32 15 Temp:      
SpO2: 97% 98% 98% 95% Weight:    157 lb 9.6 oz (71.5 kg) Height:      
 
 
 
Physical Exam:         
Constitutional:  intubated and mechanically ventilated. EENMT:  Sclera clear, pupils equal. Barely opens eyes with stimulation. Respiratory: coarse bilaterally and equal. Currently on AC. Respirations slightly labored with tachypnea - looks better today than yesterday. Cardiovascular:  RRR with no M,G,R; sinus per telemetry Gastrointestinal:  soft with no evidence of tenderness; positive bowel sounds present Musculoskeletal:  warm with no cyanosis, minimal edema Skin:  no jaundice or ecchymosis Neurologic: barely opens eyes when stimulated. Can't follow commands or interact Psychiatric:  Cannot assess LINES:  Trach, central line, coyle, flexiseal, NG 
 
DRIPS:  TF 
 
CXR:   
 
 
Ventilator Settings Mode FIO2 Rate Tidal Volume Pressure PEEP Pressure control  40 % 24 0.4 ml  12 cm H2O  8 cm H20 Peak airway pressure: 34 cm H2O Minute ventilation: 15 l/min ABG:  
Recent Labs  
  02/16/21 
0415 02/15/21 
0313 PHI 7.38 7.43  
PCO2I 65.0* 57.7*  
PO2I 122* 55* HCO3I 38.6* 37.9*  
  
 
LAB Recent Labs  
  02/17/21 
0555 02/16/21 
2356 02/16/21 
2210 02/16/21 
1738 02/16/21 
1138 GLUCPOC 219* 254* 211* 115* 168* Recent Labs  
  02/17/21 
0320 02/16/21 
0404 02/15/21 
0421 WBC 15.7* 11.8* 10.4 HGB 9.5* 9.1* 8.5* HCT 29.1* 29.1* 27.0*  
 192 170 Recent Labs  
  02/17/21 
0320 02/16/21 
0404 02/15/21 
0421  142 147* K 4.2 4.4 3.3*  
CL 99 102 108* CO2 38* 38* 37* * 219* 223* BUN 81* 84* 91* CREA 1.53* 1.43* 1.48* CA 9.3 9.5 9.2 Assessment:  (Medical Decision Making) Patient Active Problem List  
Diagnosis Code  Diabetes mellitus with hyperosmolarity without hyperglycemic hyperosmolar nonketotic coma ([facility] E11.00  
  Coronary artery disease involving coronary bypass graft of native heart without angina pectoris I25.810  
 Uncontrolled type 2 diabetes mellitus with hyperglycemia ([facility]) E11.65  
 Essential hypertension I10  
 HLD (hyperlipidemia) E78.5  Chronic kidney disease, stage III (moderate) N18.30  
 Osteopenia M85.80  Vitamin D deficiency E55.9  Gastroesophageal reflux disease without esophagitis K21.9  Other allergic rhinitis J30.89  Peripheral neuropathy G62.9  
 Primary osteoarthritis involving multiple joints M89.49  
 Insomnia G47.00  RLS (restless legs syndrome) G25.81  
 Anemia D64.9  Generalized weakness R53.1  Decreased oral intake R63.8  Acute on chronic renal failure (HCC) N17.9, N18.9  Noncompliance Z91.19  
 Hypoalbuminemia due to protein-calorie malnutrition ([facility] E88.09, E46  
 COVID-19 U07.1  Acute respiratory distress syndrome (ARDS) due to severe acute respiratory syndrome coronavirus 2 (SARS-CoV-2) ([facility]) U07.1, J80  Acute hypoxemic respiratory failure ([facility]) J96.01  
 Cardiac arrest ([facility]) I46.9  
 Encephalopathy G93.40 Plan:  (Medical Decision Making) Hospital Problems  Date Reviewed: 2/5/2021 Codes Class Noted POA Encephalopathy ICD-10-CM: G93.40 ICD-9-CM: 348.30  2/7/2021 Unknown Barely opens eyes with stimulation. On precedex and using prn Dilaudid to help control resp rate Cardiac arrest [facility]) ICD-10-CM: I46.9 ICD-9-CM: 427.5  12/30/2020 No  
 No signs of hypoxic brain injury per neurology COVID-19 ICD-10-CM: U07.1 ICD-9-CM: 079.89  12/24/2020 Yes Off isolation * (Principal) Acute respiratory distress syndrome (ARDS) due to severe acute respiratory syndrome coronavirus 2 (SARS-CoV-2) ([facility]) ICD-10-CM: U07.1, J80 
ICD-9-CM: 518.82, 079.89  12/24/2020 No  
 Remains on vent support - TV up to 400 to help with tachypnea. PIP better today.    
 Acute hypoxemic respiratory failure (HCC) ICD-10-CM: J96.01 
 ICD-9-CM: 518.81  12/24/2020 Yes As above Acute on chronic renal failure (HCC) ICD-10-CM: N17.9, N18.9 ICD-9-CM: 584.9, 585.9  9/17/2019 Yes Stable off dialysis Coronary artery disease involving coronary bypass graft of native heart without angina pectoris (Chronic) ICD-10-CM: H47.092 ICD-9-CM: 414.05  6/15/2015 Yes  
 stable Uncontrolled type 2 diabetes mellitus with hyperglycemia (HCC) (Chronic) ICD-10-CM: E11.65 ICD-9-CM: 250.02  6/15/2015 Yes BS low 200s Diabetes mellitus with hyperosmolarity without hyperglycemic hyperosmolar nonketotic coma (HCC) (Chronic) ICD-10-CM: E11.00 ICD-9-CM: 250.20  11/24/2014 Yes 1. On AC - TV increased to 400 mls yesterday. Resp rate better today - nurse using prn Dilaudid with Precedex infusion to help control respiratory rate. Increased Roxicodone dose yesterday as well. Diuresing daily to see if this will help as well. On scheduled Risperdal as well but will hold off on increasing dose for now as I don't know that this will help with respiratory rate. 2. On waiting list for [facility] 3. Needs PEG - GI saw previously but put on hold pending family discussion. Patient now DNR but family still wants most things done (just not any pressors or further blood transfusions). ? schedule 4. Flexiseal remains - no output recorded yesterday. Will add scheduled Imodium to Probiotic and reassess. Add Questran if needed. 5. Now on scheduled lasix - 80 mg BID. Fluid balance neg 1.3 liters over past 24 hours. Renal function stable despite noted azotemia. Last BNP was 11,000. Hard to interpret CXR with ARDS. 6. Increased lantus Monday for better BS control but BS still in the low 200s so will increase again today 7. WBC up to 15 today. Sputum tan in color. Completed Vanc on Monday for MRSA in sputum from 2/8. Will repeat sputum culture today and hold on abx for now. 8. Hemoglobin stable. Transfused over the weekend. Stool looks tan in color. Started on Protonix empirically but no hemocult sent. Holding subq heparin for now 9. I called and updated daughter, Phil. She did not visit yesterday with the hope that her sister, Binh Lozano would but Binh Lozano never came. Phil states the Binh Lozano is living in her mothers home for free right now (getting COVID coverage for rent) so she would lose her home if anything happened to her mother. Phil does not think that Binh Lozano will ever decide to withdraw care. Think we should proceed with PEG placement and movement towards long term facility. 8. Labs M, W, F More than 50% of the time documented was spent in face-to-face contact with the patient and in the care of the patient on the floor/unit where the patient is located. Cee Yang NP Lungs: CTA b./l. less tachypnea today. Heart S1 and S2 audible, no murmers or rubs appreciated Other Overall looks more calm. BP lower today. No pressors per family. Not sure she will survive. Will stop hydralizine I have spoken with and examined the patient. I have reviewed the history, examination, assessment, and plan and agree with the above. Kimberly Hilton MD 
 
 
This note was signed electronically. Errors are unfortunately her likely due to dictation software.

## 2021-02-17 NOTE — PROGRESS NOTES
Care Management Interventions PCP Verified by CM: Linda West MD) Mode of Transport at Discharge: Other (see comment)(To [facility]) Transition of Care Consult (CM Consult): Discharge Planning, LTAC Discharge Durable Medical Equipment: No 
Physical Therapy Consult: Yes Occupational Therapy Consult: Yes Speech Therapy Consult: Yes Current Support Network: Family Lives Nearby(Pt lives in rented trailer. Kaylynn Rivera, her daughter, has lived with her intermittently) Confirm Follow Up Transport: Family The Plan for Transition of Care is Related to the Following Treatment Goals : LTAC The Patient and/or Patient Representative was Provided with a Choice of Provider and Agrees with the Discharge Plan?: Yes Name of the Patient Representative Who was Provided with a Choice of Provider and Agrees with the Discharge Plan: Daughters, Zita Smith and Kaylynn Rivera Freedom of Choice List was Provided with Basic Dialogue that Supports the Patient's Individualized Plan of Care/Goals, Treatment Preferences and Shares the Quality Data Associated with the Providers?: Yes [employer] Information Provided?: No 
Discharge Location Discharge Placement: [facility]) CM met with pt's daughter, Zita Smith, visiting in pt's room. She states she's not surprised Kaylynn Rivera, her sister didn't come to visit after this CM encouraged it. She states Kaylynn Rivera (and her 5-6 children) are living in her mother's home using her food stamps. When her mother passes, Kaylynn Rivera will be displaced, according to Zita Smith. She verbalizes regret not getting POA completed but she did not see this situation in their future. CM noted B/P lower today. PEG discussed with Zita Smith. Plan to proceed. Awaiting LTAC placement.

## 2021-02-17 NOTE — PROGRESS NOTES
Ventilator check complete; patient has a #6 tracheostomy. Patient is sedated. Patient is not able to follow commands. Breath sounds are coarse. Trachea is midline, Negative for subcutaneous air, and chest excursion is symmetric. Patient is also Negative for cyanosis and is Positive for pitting edema. All alarms are set and audible. Resuscitation bag is at the head of the bed. Ventilator Settings Mode FIO2 Rate Tidal Volume Pressure PEEP I:E Ratio VC+  40 %    0.4 ml  12 cm H2O  8 cm H20  1:2   
 
Peak airway pressure: 34 cm H2O Minute ventilation: 11.9 l/min ABG: No results for input(s): PH, PCO2, PO2, HCO3 in the last 72 hours.  
 
 
Toro Huertas, RT

## 2021-02-18 NOTE — PROGRESS NOTES
Bedside, Verbal and Written shift change report given to Jackie Morales RN (oncoming nurse) by Pati Drummond (offgoing nurse). Report included the following information SBAR, Kardex, Intake/Output, MAR, Recent Results, Cardiac Rhythm NSR and Alarm Parameters .

## 2021-02-18 NOTE — PROGRESS NOTES
Comprehensive Nutrition Assessment Type and Reason for Visit: [place] Tube Feeding Management [facility] Pulmonary) Nutrition Recommendations/Plan:  
? Continue current TF - Nepro @ 35 ml/hr with 1 packet ProSource TF daily and 50 ml/hr water flush. Malnutrition Assessment: 
Malnutrition Status: Insufficient data Nutrition Assessment:  
Nutrition History: 12/31: 3 recorded meals in past 6 days with average intake of 83% Nutrition Background: H/O: CAD, HTN, DM, HLD, peripheral neuropathy, CKD stage III. Presented with dyspnea. Findings of +COVID. Admitted to ICU with ARDS, severe acute hypoxic due to COVID PNA, PRATIMA on CKD. Was requiring BIPAP at night and Aviro during day. Had brief cardiac arrest and was intubated 12/30 Daily Update: 
Remains ventilated via trach. Patient seen and discussed with Cali Maria RN. She reports some hypoglycemia today despite TF infusing without interruption. Lantus 25 units twice daily (last given last evening) has been stopped. Stool being captured with fecal management system and she continues on Florastor. Imodium was added yesterday. Abdominal Status (last documented): Intact, Semi-soft abdomen with Active  bowel sounds. Last BM 02/18/21. Pertinent Medications: Lasix, SSI coverage, Imodium, Florastor, Dilaudid Drips: Precedex Pertinent Labs: AM glucose 244, BUN 81, creatinine 1.53; POC (2/18) 18,96,57,826 Nutrition Related Findings:  
Intubated and OGT placed 12/30. CRRT initiated on 1/12. TF formula changed to a renal formula on 1/11. s/p Trach 1/19, NG placed 1/19. NGT replaced 1/29. Wound Type: Stage II(to elbow) Current Nutrition Therapies: 
Current Tube Feeding (TF) Orders: · Feeding Route: Nasogastric · Formula: Nepro · Schedule:Continuous · Regimen: 35 ml/hr · Additives/Modulars: Protein(1 packet prosource TF per day with 50ml water bofore/after) · Water Flushes: 50 ml/hr · Current TF & Flush Orders Provides: at goal 
 · Goal TF & Flush Orders Provides: ~5691 calories/day (100% calorie goal), 79 gm protein/day (100% protein goal) in ~1900 ml water/day Current Intake: Average Meal Intake: NPO Average Supplement Intake: NPO Additional Caloric Sources: ( ) Anthropometric Measures: 
Height: 5' 5\" (165.1 cm) Current Body Wt: 71.5 kg (157 lb 10.1 oz)(2/17/21), Weight source: Bed scale BMI: 26.2, Overweight (BMI 25.0-29. 9) Admission Body Weight: 159 lb 2.8 oz(Bedscale) Ideal Body Weight (lbs) (Calculated): 125 lbs (57 kg), 123.8 % Usual Body Wt: 73.5 kg (162 lb)(Per EMR 11/5/82020), Percent weight change: -4.5 Estimated Daily Nutrient Needs: 
Energy (kcal/day): 4739-0222 (Kcal/kg(25-30), Weight Used: Ideal(56.8)) Protein (g/day): 68-86 (1.2-1.5 g/kg) Weight Used: (Ideal) Fluid (ml/day):   (Standard renal) Nutrition Diagnosis:  
· Inadequate oral intake related to impaired respiratory function as evidenced by (intubated, NPO, TF to meet needs) Nutrition Interventions:  
Food and/or Nutrient Delivery: Continue NPO, Continue tube feeding Coordination of Nutrition Care: Continue to monitor while inpatient Plan of Care discussed with [address], RN. Goals:  
Previous Goal Met: Goal(s) achieved Active Goal: Maintain TF to meet estimated needs Nutrition Monitoring and Evaluation:  
  
Food/Nutrient Intake Outcomes: Enteral nutrition intake/tolerance Physical Signs/Symptoms Outcomes: Biochemical data, GI status Discharge Planning: Too soon to determine Chidi Bong. Madeline Cisneros RD, LD on 2/18/2021 at 3:12 PM 
Contact: [phone] Disaster Mode active

## 2021-02-18 NOTE — PROGRESS NOTES
Care Management Interventions PCP Verified by CM: Josseline Anderson MD) Mode of Transport at Discharge: Other (see comment)(To [facility]) Transition of Care Consult (CM Consult): Discharge Planning, LTAC Discharge Durable Medical Equipment: No 
Physical Therapy Consult: Yes Occupational Therapy Consult: Yes Speech Therapy Consult: Yes Current Support Network: Family Lives Nearby(Pt lives in rented trailer. Bandar House, her daughter, has lived with her intermittently) Confirm Follow Up Transport: Family The Plan for Transition of Care is Related to the Following Treatment Goals : LTAC The Patient and/or Patient Representative was Provided with a Choice of Provider and Agrees with the Discharge Plan?: Yes Name of the Patient Representative Who was Provided with a Choice of Provider and Agrees with the Discharge Plan: Kalpana, Shavon Pineda and Bandar House Sargent of Choice List was Provided with Basic Dialogue that Supports the Patient's Individualized Plan of Care/Goals, Treatment Preferences and Shares the Quality Data Associated with the Providers?: Yes [employer] Information Provided?: No 
Discharge Location Discharge Placement: [address] [place] [address] CM received confirmation from Daisy, with [facility], of insurance approval for pt to [facility]. ARAMIS, along with Dr Perry Fried and Smita Mcgee, in discussion this AM  with Daisy regarding POC for pt established in this acute setting. Dr Perry Fried has established with pt's daughter, Shavon Pineda, for \"no escalation of care\" and wishes for this care to continue to [facility] f/u with this CM if [facility] accepting of this POC for discharge to [facility].

## 2021-02-18 NOTE — PROGRESS NOTES
Critical Care Daily Progress Note: 2/18/2021 Admission Date: 12/24/2020 The patient's chart is reviewed and the patient is discussed with the staff. 79 yo female admitted 12/24 with acute respiratory failure secondary to COVID 19.  Pt was treated with Decadron and convalescent plasma. She had PRATIMA and was not treated with Remdesivir.  She was initially on BiPAP but pt had a  cardiac arrest on 12/30 requiring intubation.  She has remained vent dependent and a trach was placed on 1/19. Pt has been treated for an E coli UTII as well as MRSA in her sputum. He developed acute on chronic renal failure and started on dialysis on 1/12 but now off of since 1/25 and there are no plans to restart. Eboni Cardoso consulted for PEG but this on hold pending family discussion regarding need for long term care. Pt was seen by Neurology with an abnormal EEG with moderate to severe slowing. Brain MRI 1/26 revealed stable age-related senescent changes and chronic microvascular disease with remote right posterior temporal occipital ischemic insult. No evidence anoxic brain injury per neuro - deep encephalopathy. Has required IV sedation for prolonged periods due to tachypnea. Subjective:  
   Unresponsive. Getting prn Dilaudid and on Precedex infusion to control respiratory rate. Blood pressure down yesterday but better today. Alona Javier Current Facility-Administered Medications Medication Dose Route Frequency  insulin glargine (LANTUS) injection 25 Units  25 Units SubCUTAneous Q12H  
 loperamide (IMODIUM) 1 mg/7.5 mL oral solution 2 mg  2 mg Per NG tube Q6H  
 pantoprazole (PROTONIX) 80 mg in 0.9% sodium chloride 20 mL injection  80 mg IntraVENous Q12H  furosemide (LASIX) injection 80 mg  80 mg IntraVENous Q12H  
 oxyCODONE IR (ROXICODONE) tablet 15 mg  15 mg Per NG tube Q6H  
 Saccharomyces boulardii (FLORASTOR) capsule 250 mg  250 mg Per NG tube BID  
  LORazepam (ATIVAN) injection 1 mg  1 mg IntraVENous Q6H PRN  
 risperiDONE (RisperDAL) 1 mg/mL oral solution soln 1 mg  1 mg Nasogastric Q12H  
 guaiFENesin (ROBITUSSIN) 100 mg/5 mL oral liquid 100 mg  100 mg Per NG tube Q4H PRN  
 insulin regular (NOVOLIN R, HUMULIN R) injection   SubCUTAneous Q6H  
 albuterol (PROVENTIL VENTOLIN) nebulizer solution 2.5 mg  2.5 mg Nebulization Q6H RT  
 HYDROmorphone (PF) (DILAUDID) injection 2 mg  2 mg IntraVENous Q3H PRN  
 dexmedeTOMidine in 0.9 % NaCl (PRECEDEX) 400 mcg/100 mL (4 mcg/mL) infusion soln  0.1-1.5 mcg/kg/hr IntraVENous TITRATE  hydrALAZINE (APRESOLINE) 20 mg/mL injection 10 mg  10 mg IntraVENous Q6H PRN  
 docusate (COLACE) 50 mg/5 mL oral liquid 100 mg  100 mg Per NG tube DAILY PRN  
 heparin (porcine) 1,000 unit/mL injection 5,000 Units  5,000 Units IntraVENous DIALYSIS PRN  
 alcohol 62% (NOZIN) nasal  1 Ampule  1 Ampule Topical Q12H  
 [Held by provider] heparin (porcine) injection 5,000 Units  5,000 Units SubCUTAneous Q8H  
 bisacodyL (DULCOLAX) suppository 10 mg  10 mg Rectal DAILY PRN  
 NUTRITIONAL SUPPORT ELECTROLYTE PRN ORDERS   Does Not Apply PRN  
 dextrose 40% (GLUTOSE) oral gel 1 Tube  15 g Oral PRN  
 glucagon (GLUCAGEN) injection 1 mg  1 mg IntraMUSCular PRN  
 dextrose (D50W) injection syrg 12.5-25 g  25-50 mL IntraVENous PRN  
 albuterol (PROVENTIL HFA, VENTOLIN HFA, PROAIR HFA) inhaler 2 Puff  2 Puff Inhalation Q4H PRN  phenol throat spray (CHLORASEPTIC) 1 Spray  1 Spray Oral PRN  
 lip protectant (BLISTEX) ointment 1 Each  1 Each Topical PRN  
 acetaminophen (TYLENOL) tablet 650 mg  650 mg Oral Q6H PRN  
 sodium chloride (NS) flush 5-40 mL  5-40 mL IntraVENous Q8H  
 sodium chloride (NS) flush 5-40 mL  5-40 mL IntraVENous PRN Review of Systems: cannot obtain due to patient condition Objective:  
 
Vitals:  
 02/18/21 5155 02/18/21 0752 02/18/21 0759 02/18/21 4739 BP: 130/60 139/66 136/63 Pulse: 80 82 80 78 Resp: 25 27 29 28 Temp:      
SpO2: 95% 95% 93% 94% Weight:      
Height:      
 
 
 
Physical Exam:         
Constitutional:  Intubated and mechanically ventilated. EENMT:  Sclera clear, pupils equal. Eyes not open today. Respiratory: Rhonchi bilaterally and equal. Currently on VC. Respirations not labored at present Cardiovascular:  RRR with no M,G,R; sinus per telemetry Gastrointestinal:  soft with no evidence of tenderness; positive bowel sounds present Musculoskeletal:  warm with no cyanosis, minimal edema Skin:  no jaundice or ecchymosis Neurologic: Can't follow commands or interact Psychiatric:  Cannot assess LINES:  Trach, central line, coyle, flexiseal, NG 
 
DRIPS:  TF, precedex CXR:   
 
 
Ventilator Settings Mode FIO2 Rate Tidal Volume Pressure PEEP  
VC+  35 % 24 430 mls 12 cm H2O  8 cm H20 Peak airway pressure: 35 cm H2O Minute ventilation: 12.3 l/min ABG:  
Recent Labs  
  02/16/21 
0415 PHI 7.38  
PCO2I 65.0*  
PO2I 122* HCO3I 38.6* LAB Recent Labs  
  02/18/21 
0537 02/17/21 
2335 02/17/21 
1813 02/17/21 
1221 02/17/21 
1341 GLUCPOC 69 140* 156* 148* 219* Recent Labs  
  02/17/21 
0320 02/16/21 
0404 WBC 15.7* 11.8* HGB 9.5* 9.1*  
HCT 29.1* 29.1*  
 192 Recent Labs  
  02/17/21 
0320 02/16/21 
0404  142  
K 4.2 4.4  
CL 99 102 CO2 38* 38* * 219* BUN 81* 84* CREA 1.53* 1.43* CA 9.3 9.5 Assessment:  (Medical Decision Making) Patient Active Problem List  
Diagnosis Code  Diabetes mellitus with hyperosmolarity without hyperglycemic hyperosmolar nonketotic coma ([facility] [address] [facility] E11.00  Coronary artery disease involving coronary bypass graft of native heart without angina pectoris I25.810  
 Uncontrolled type 2 diabetes mellitus with hyperglycemia (HCC) E11.65  
 Essential hypertension I10  
 HLD (hyperlipidemia) E78.5  Chronic kidney disease, stage III (moderate) N18.30  Osteopenia M85.80  Vitamin D deficiency E55.9  Gastroesophageal reflux disease without esophagitis K21.9  Other allergic rhinitis J30.89  Peripheral neuropathy G62.9  
 Primary osteoarthritis involving multiple joints M89.49  
 Insomnia G47.00  RLS (restless legs syndrome) G25.81  
 Anemia D64.9  Generalized weakness R53.1  Decreased oral intake R63.8  Acute on chronic renal failure (HCC) N17.9, N18.9  Noncompliance Z91.19  
 Hypoalbuminemia due to protein-calorie malnutrition ([facility] [address] E88.09, E46  
 COVID-19 U07.1  Acute respiratory distress syndrome (ARDS) due to severe acute respiratory syndrome coronavirus 2 (SARS-CoV-2) ([facility]) U07.1, J80  Acute hypoxemic respiratory failure ([facility]) J96.01  
 Cardiac arrest ([facility]) I46.9  
 Encephalopathy G93.40 Plan:  (Medical Decision Making) Hospital Problems  Date Reviewed: 2/5/2021 Codes Class Noted POA Encephalopathy ICD-10-CM: G93.40 ICD-9-CM: 348.30  2/7/2021 Unknown Grimacing today but eyes not open. On precedex and using prn Dilaudid to help control resp rate Cardiac arrest [facility]) ICD-10-CM: I46.9 ICD-9-CM: 427.5  12/30/2020 No  
 No signs of hypoxic brain injury per neurology COVID-19 ICD-10-CM: U07.1 ICD-9-CM: 079.89  12/24/2020 Yes Off isolation * (Principal) Acute respiratory distress syndrome (ARDS) due to severe acute respiratory syndrome coronavirus 2 (SARS-CoV-2) ([facility]) ICD-10-CM: U07.1, J80 
ICD-9-CM: 518.82, 079.89  12/24/2020 No  
 Remains on vent support -VC at present and looks comfortable this AM  
 Acute hypoxemic respiratory failure ([facility] ICD-10-CM: J96.01 
ICD-9-CM: 518.81  12/24/2020 Yes As above Acute on chronic renal failure (HCC) ICD-10-CM: N17.9, N18.9 ICD-9-CM: 584.9, 585.9  9/17/2019 Yes Stable off dialysis  Coronary artery disease involving coronary bypass graft of native heart without angina pectoris (Chronic) ICD-10-CM: J12.692 
 ICD-9-CM: 414.05  6/15/2015 Yes  
 stable Uncontrolled type 2 diabetes mellitus with hyperglycemia (HCC) (Chronic) ICD-10-CM: E11.65 ICD-9-CM: 250.02  6/15/2015 Yes BS low this AM - rechecking Diabetes mellitus with hyperosmolarity without hyperglycemic hyperosmolar nonketotic coma (HCC) (Chronic) ICD-10-CM: E11.00 ICD-9-CM: 250.20  11/24/2014 Yes 1. On VC - TV low 400 mls. She looks comfortable this AM.  - nurse using prn Dilaudid with Precedex infusion to help control respiratory rate. Tried to decrease Precedex this morning but she became more restless so rate increased back to 1.5. On scheduled Roxicodone and Risperdal as well. Diuresing daily to see if this will help as well. Labs tomorrow to reassess renal function. 2. On waiting list for [facility] but need to be sure they are comfortable with category status. No plans to escalate care per discussion with daughter, Ridge Hawkins, yesterday. She is not going to visit today so she can give her sister, Trevin Harry, the chance to come. 3. No plan for PEG 4. Rajendra Gobble remains - only about 100 mls of output yesterday. Have added scheduled Imodium with Probiotic and reassess. Hopefully remove flexiseal soon 5. Now on scheduled lasix - 80 mg BID. Fluid balance neg 900 mls over past 24 hours. Renal function has been stable despite noted azotemia. Last BNP was 11,000. Hard to interpret CXR with ARDS. BMP tomorrow. Blood pressure OK today. 6. Increased lantus Monday for better BS control but BS low today so will need to decrease dose 7. WBC up to 15,  Sputum with GNRs - has only grown MRSA in the past. No plans to treat following discussion with daughter, Ridge Hawkins, yesterday. 8. Hemoglobin has been stable. Transfused last weekend but no plans to transfuse again per family decision. . Stool looks lucas in color. Started on Protonix empirically but no hemocult sent. Holding subq heparin for now More than 50% of the time documented was spent in face-to-face contact with the patient and in the care of the patient on the floor/unit where the patient is located. Tracee Ko, NP Lungs: coarse b/l Heart S1 and S2 audible, no murmers or rubs appreciated Other Tried to decreased precedex and more agitated. Had to go back up. Patient can only [address], if they honor current treatment course or will not be going. I have spoken with and examined the patient. I have reviewed the history, examination, assessment, and plan and agree with the above. Lynn Mak MD 
 
 
This note was signed electronically. Errors are unfortunately her likely due to dictation software.

## 2021-02-18 NOTE — PROGRESS NOTES
Ventilator check complete; patient has a #6 tracheostomy. Patient is sedated. Patient is not able to follow commands. Breath sounds are diminished. Trachea is midline, Negative for subcutaneous air, and chest excursion is symmetric. Patient is also Negative for cyanosis and is Positive for pitting edema. All alarms are set and audible. Resuscitation bag is at the head of the bed. Ventilator Settings Mode FIO2 Rate Tidal Volume Pressure PEEP I:E Ratio VC+  35 %   28 0.4 ml  12 cm H2O  8 cm H20  1:1.3 Peak airway pressure: 35 cm H2O Minute ventilation: 12.3 l/min ABG: No results for input(s): PH, PCO2, PO2, HCO3 in the last 72 hours.  
 
 
Hortencia Humphreys, RT

## 2021-02-19 NOTE — PROGRESS NOTES
Critical Care Daily Progress Note: 2/19/2021 Admission Date: 12/24/2020 The patient's chart is reviewed and the patient is discussed with the staff. 79 yo female admitted 12/24 with acute respiratory failure secondary to COVID 19.  Pt was treated with Decadron and convalescent plasma. She had PRATIMA and was not treated with Remdesivir.  She was initially on BiPAP but pt had a  cardiac arrest on 12/30 requiring intubation.  She has remained vent dependent and a trach was placed on 1/19. Pt has been treated for an E coli UTII as well as MRSA in her sputum. He developed acute on chronic renal failure and started on dialysis on 1/12 but now off of since 1/25 and there are no plans to restart. Dustin Gonzalez consulted for PEG but this on hold pending family discussion regarding need for long term care. Pt was seen by Neurology with an abnormal EEG with moderate to severe slowing. Brain MRI 1/26 revealed stable age-related senescent changes and chronic microvascular disease with remote right posterior temporal occipital ischemic insult. No evidence anoxic brain injury per neuro - deep encephalopathy. Has required IV sedation for prolonged periods due to tachypnea. Subjective:  
 Grimaces when stimulated. Daughter, Charles Weeks, at bedside. Remains on Precedex - increased resp rate when decreased. Current Facility-Administered Medications Medication Dose Route Frequency  risperiDONE (RisperDAL) 1 mg/mL oral solution soln 0.5 mg  0.5 mg Nasogastric Q12H  
 oxyCODONE IR (ROXICODONE) tablet 10 mg  10 mg Per NG tube Q6H  
 pantoprazole (PROTONIX) 40 mg in 0.9% sodium chloride 10 mL injection  40 mg IntraVENous DAILY  loperamide (IMODIUM) 1 mg/7.5 mL oral solution 2 mg  2 mg Per NG tube Q6H  
 furosemide (LASIX) injection 80 mg  80 mg IntraVENous Q12H  
 Saccharomyces boulardii (FLORASTOR) capsule 250 mg  250 mg Per NG tube BID  LORazepam (ATIVAN) injection 1 mg  1 mg IntraVENous Q6H PRN  
  guaiFENesin (ROBITUSSIN) 100 mg/5 mL oral liquid 100 mg  100 mg Per NG tube Q4H PRN  
 insulin regular (NOVOLIN R, HUMULIN R) injection   SubCUTAneous Q6H  
 albuterol (PROVENTIL VENTOLIN) nebulizer solution 2.5 mg  2.5 mg Nebulization Q6H RT  
 HYDROmorphone (PF) (DILAUDID) injection 2 mg  2 mg IntraVENous Q3H PRN  
 dexmedeTOMidine in 0.9 % NaCl (PRECEDEX) 400 mcg/100 mL (4 mcg/mL) infusion soln  0.1-1.5 mcg/kg/hr IntraVENous TITRATE  hydrALAZINE (APRESOLINE) 20 mg/mL injection 10 mg  10 mg IntraVENous Q6H PRN  
 docusate (COLACE) 50 mg/5 mL oral liquid 100 mg  100 mg Per NG tube DAILY PRN  
 heparin (porcine) 1,000 unit/mL injection 5,000 Units  5,000 Units IntraVENous DIALYSIS PRN  
 alcohol 62% (NOZIN) nasal  1 Ampule  1 Ampule Topical Q12H  
 [Held by provider] heparin (porcine) injection 5,000 Units  5,000 Units SubCUTAneous Q8H  
 bisacodyL (DULCOLAX) suppository 10 mg  10 mg Rectal DAILY PRN  
 NUTRITIONAL SUPPORT ELECTROLYTE PRN ORDERS   Does Not Apply PRN  
 dextrose 40% (GLUTOSE) oral gel 1 Tube  15 g Oral PRN  
 glucagon (GLUCAGEN) injection 1 mg  1 mg IntraMUSCular PRN  
 dextrose (D50W) injection syrg 12.5-25 g  25-50 mL IntraVENous PRN  
 albuterol (PROVENTIL HFA, VENTOLIN HFA, PROAIR HFA) inhaler 2 Puff  2 Puff Inhalation Q4H PRN  phenol throat spray (CHLORASEPTIC) 1 Spray  1 Spray Oral PRN  
 lip protectant (BLISTEX) ointment 1 Each  1 Each Topical PRN  
 acetaminophen (TYLENOL) tablet 650 mg  650 mg Oral Q6H PRN  
 sodium chloride (NS) flush 5-40 mL  5-40 mL IntraVENous Q8H  
 sodium chloride (NS) flush 5-40 mL  5-40 mL IntraVENous PRN Review of Systems: cannot obtain due to patient condition Objective:  
 
Vitals:  
 02/19/21 9913 02/19/21 2493 02/19/21 0914 02/19/21 7995 BP:  (!) 90/55 135/63 (!) 155/73 Pulse: 60 60 67 84 Resp: 20 20 30 (!) 33 Temp:      
SpO2: 95% 95% 96% 95% Weight:      
Height:      
 
 
 
Physical Exam: Constitutional:  Intubated and mechanically ventilated. EENMT:  Sclera clear, pupils equal. Eyes not open today. Respiratory: Rhonchi bilaterally and equal. Currently on VC. Respirations not labored at present Cardiovascular:  RRR with no M,G,R; sinus per telemetry Gastrointestinal:  soft with no evidence of tenderness; positive bowel sounds present Musculoskeletal:  warm with no cyanosis, minimal edema Skin:  no jaundice or ecchymosis Neurologic: Grimaces with stimulation Psychiatric:  Cannot assess LINES:  Trach, central line, coyle,  NG 
 
DRIPS:  TF, precedex CXR:   
 
 
Ventilator Settings Mode FIO2 Rate Tidal Volume Pressure PEEP  
VC+  35 % 24 430 mls 12 cm H2O  8 cm H20 Peak airway pressure: 32 cm H2O Minute ventilation: 8.71 l/min ABG:  
Recent Labs  
  02/19/21 
0400 PHI 7.61* PCO2I 43.4 PO2I 72* HCO3I 43.7* LAB Recent Labs  
  02/19/21 
0623 02/18/21 
2310 02/18/21 
1736 02/18/21 
1325 02/18/21 
1305 GLUCPOC 188* 128* 112* 106* 57* Recent Labs  
  02/19/21 
0500 02/17/21 
0320 WBC 11.2* 15.7* HGB 9.1* 9.5* HCT 28.3* 29.1*  
 217 Recent Labs  
  02/19/21 
0500 02/17/21 
0320  140  
K 3.6 4.2 CL 97* 99  
CO2 42* 38* * 244* BUN 72* 81* CREA 1.54* 1.53* MG 1.9  --   
PHOS 4.2*  --   
CA 9.6 9.3 Assessment:  (Medical Decision Making) Patient Active Problem List  
Diagnosis Code  Diabetes mellitus with hyperosmolarity without hyperglycemic hyperosmolar nonketotic coma ([facility] [address] E11.00  Coronary artery disease involving coronary bypass graft of native heart without angina pectoris I25.810  
 Uncontrolled type 2 diabetes mellitus with hyperglycemia (HCC) E11.65  
 Essential hypertension I10  
 HLD (hyperlipidemia) E78.5  Chronic kidney disease, stage III (moderate) N18.30  
 Osteopenia M85.80  Vitamin D deficiency E55.9  Gastroesophageal reflux disease without esophagitis K21.9  Other allergic rhinitis J30.89  Peripheral neuropathy G62.9  
 Primary osteoarthritis involving multiple joints M89.49  
 Insomnia G47.00  RLS (restless legs syndrome) G25.81  
 Anemia D64.9  Generalized weakness R53.1  Decreased oral intake R63.8  Acute on chronic renal failure (HCC) N17.9, N18.9  Noncompliance Z91.19  
 Hypoalbuminemia due to protein-calorie malnutrition ([facility] [address] [facility] E88.09, E46  
 COVID-19 U07.1  Acute respiratory distress syndrome (ARDS) due to severe acute respiratory syndrome coronavirus 2 (SARS-CoV-2) ([facility]) U07.1, J80  Acute hypoxemic respiratory failure ([facility]) J96.01  
 Cardiac arrest ([facility]) I46.9  
 Encephalopathy G93.40 Plan:  (Medical Decision Making) Hospital Problems  Date Reviewed: 2/5/2021 Codes Class Noted POA Encephalopathy ICD-10-CM: G93.40 ICD-9-CM: 348.30  2/7/2021 Unknown Grimacing today but eyes not open. On precedex and using prn Dilaudid to help control resp rate Cardiac arrest [facility]) ICD-10-CM: I46.9 ICD-9-CM: 427.5  12/30/2020 No  
 No signs of hypoxic brain injury per neurology COVID-19 ICD-10-CM: U07.1 ICD-9-CM: 079.89  12/24/2020 Yes Off isolation * (Principal) Acute respiratory distress syndrome (ARDS) due to severe acute respiratory syndrome coronavirus 2 (SARS-CoV-2) ([facility]) ICD-10-CM: U07.1, J80 
ICD-9-CM: 518.82, 079.89  12/24/2020 No  
 Remains on vent support -VC at present and looks comfortable this AM  
 Acute hypoxemic respiratory failure ([facility] [address] ICD-10-CM: J96.01 
ICD-9-CM: 518.81  12/24/2020 Yes As above Acute on chronic renal failure (HCC) ICD-10-CM: N17.9, N18.9 ICD-9-CM: 584.9, 585.9  9/17/2019 Yes Stable off dialysis Coronary artery disease involving coronary bypass graft of native heart without angina pectoris (Chronic) ICD-10-CM: P43.589 ICD-9-CM: 414.05  6/15/2015 Yes  
 stable  Uncontrolled type 2 diabetes mellitus with hyperglycemia (HCC) (Chronic) ICD-10-CM: E11.65 
 ICD-9-CM: 250.02  6/15/2015 Yes BS upper 100s to 200s today Diabetes mellitus with hyperosmolarity without hyperglycemic hyperosmolar nonketotic coma (HCC) (Chronic) ICD-10-CM: E11.00 ICD-9-CM: 250.20  11/24/2014 Yes 1. On VC - TV low 400 mls. She looks comfortable this AM. Tried to decrease Precedex this morning but she became more restless so rate increased back to 1.5. On scheduled Roxicodone and Risperdal as well - will decrease dose on each of these to see how she tolerates - not sure either is helping control respiratory rate. 2. Decrease lasix with noted alkalosis. 3. Canceled [facility] transfer as they wanted to escalate care there and medical staff here, along her with daughter Trav Frazier, want to leave category status as it is. 4. No plan for PEG 4. Flexiseal now out - continue probiotic but change Imodium to prn 5. Restart Lantus at lower dose 7. WBC down,  Sputum with GNRs - has only grown MRSA in the past. No plans to treat following discussion with daughter, Trav Frazier, so not abx therapy planned 8. Hemoglobin has been stable. Transfused last weekend but no plans to transfuse again per family decision. Started on Protonix empirically but no hemocult sent. Holding subq heparin for now 9. Labs M,W, F for now - may decide to stop monitoring soon. More than 50% of the time documented was spent in face-to-face contact with the patient and in the care of the patient on the floor/unit where the patient is located. Yasir Mckinney, CHER Lungs:  coarse Heart:  RRR with no Murmur/Rubs/Gallops Additional Comments: as above I have spoken with and examined the patient. I agree with the above assessment and plan as documented.  
 
Mily Avendaño MD

## 2021-02-19 NOTE — PROGRESS NOTES
Follow Up Spiritual Care Consult. End of life care. Patient did not respond;  spoke with RN. Visit by Aracelis Koch M.Ed., .B. ,Staff

## 2021-02-19 NOTE — PROGRESS NOTES
Care Management Interventions PCP Verified by CM: Kylie Aguilera MD) Mode of Transport at Discharge: Other (see comment)(To [facility]) Transition of Care Consult (CM Consult): Discharge Planning, LTAC Discharge Durable Medical Equipment: No 
Physical Therapy Consult: Yes Occupational Therapy Consult: Yes Speech Therapy Consult: Yes Current Support Network: Family Lives Nearby(Pt lives in rented trailer. Jennifer Helms, her daughter, has lived with her intermittently) Confirm Follow Up Transport: Family The Plan for Transition of Care is Related to the Following Treatment Goals : LTAC The Patient and/or Patient Representative was Provided with a Choice of Provider and Agrees with the Discharge Plan?: Yes Name of the Patient Representative Who was Provided with a Choice of Provider and Agrees with the Discharge Plan: Vanessa Acuna and Jennifer Helms Freedom of Choice List was Provided with Basic Dialogue that Supports the Patient's Individualized Plan of Care/Goals, Treatment Preferences and Shares the Quality Data Associated with the Providers?: Yes [employer] Information Provided?: No 
Discharge Location Discharge Placement: [address] [place] [address]) CM met with pt's daughter, Vanessa Larkin, who visiting at bedside. Pt remains with trach. NG tube with tube feeding infusing. Pt remains sedated with  Precedex. Pt appears to be resting peacefully. Disposition to LTAC with continued level of care; no escalation of care, no transfusions, no PEG, labs MWF. CM to continue to follow.

## 2021-02-19 NOTE — PROGRESS NOTES
Ventilator check complete; patient has a #6.0 tracheostomy. Patient is sedated. Patient is not able to follow commands. Breath sounds are coarse and diminished. Trachea ismidline, Negative for subcutaneous air, and chest excursion is symmetric. Patient is also Negative for cyanosis and is Negative for pitting edema. All alarms are set and audible. Resuscitation bag is at the head of the bed. Ventilator Settings Mode FIO2 Rate Tidal Volume Pressure PEEP I:E Ratio VC+  35 %    0.4 ml  12 cm H2O  8 cm H20  1:1.3 Peak airway pressure: 33 cm H2O Minute ventilation: 8.09 l/min ABG: No results for input(s): PH, PCO2, PO2, HCO3 in the last 72 hours. Raymona Charu

## 2021-02-19 NOTE — PROGRESS NOTES
Ventilator check complete; patient has a #6.0 tracheostomy. Patient is  sedated. Patient is not able to follow commands. Breath sounds are coarse and diminished. Trachea is midline, Negative for subcutaneous air, and chest excursion is symmetric. Patient is also Negative for cyanosis and is Negative for pitting edema. All alarms are set and audible. Resuscitation bag is at the head of the bed. Ventilator Settings Mode FIO2 Rate Tidal Volume Pressure PEEP I:E Ratio VC+  35 %   28 0.4 ml  12 cm H2O  8 cm H20  1:1.3 Peak airway pressure: 35 cm H2O Minute ventilation: 11.7 l/min Remedios Pulling, RT

## 2021-02-20 NOTE — PROGRESS NOTES
Ventilator check complete; patient has a #6 tracheostomy. Patient is not sedated. Patient is not able to follow commands. Breath sounds are coarse and diminished. Trachea is midline, Negative for subcutaneous air, and chest excursion is symmetric. Patient is also Negative for cyanosis and is Negative for pitting edema. All alarms are set and audible. Resuscitation bag is at the head of the bed. Ventilator Settings Mode FIO2 Rate Tidal Volume Pressure PEEP I:E Ratio VC+  35 %    400 ml  12 cm H2O  8 cm H20  1:1.3 Peak airway pressure: 25 cm H2O Minute ventilation: 13.4 l/min [place] RT

## 2021-02-20 NOTE — PROGRESS NOTES
Critical Care Daily Progress Note: 2/20/2021 Admission Date: 12/24/2020 The patient's chart is reviewed and the patient is discussed with the staff. 79 yo female admitted 12/24 with acute respiratory failure secondary to COVID 19.  Pt was treated with Decadron and convalescent plasma. She had PRATIMA and was not treated with Remdesivir.  She was initially on BiPAP but pt had a  cardiac arrest on 12/30 requiring intubation.  She has remained vent dependent and a trach was placed on 1/19. Pt has been treated for an E coli UTII as well as MRSA in her sputum. He developed acute on chronic renal failure and started on dialysis on 1/12 but now off of since 1/25 and there are no plans to restart. Therman Shone consulted for PEG but this on hold pending family discussion regarding need for long term care. Pt was seen by Neurology with an abnormal EEG with moderate to severe slowing. Brain MRI 1/26 revealed stable age-related senescent changes and chronic microvascular disease with remote right posterior temporal occipital ischemic insult. No evidence anoxic brain injury per neuro - deep encephalopathy. Has required IV sedation for prolonged periods due to tachypnea. Subjective:  
No changes overnight. Remains on precedex for sedation. Increased RRR if stopped. Grimaces to stimulation, but does not track or follow commands. Current Facility-Administered Medications Medication Dose Route Frequency  risperiDONE (RisperDAL) 1 mg/mL oral solution soln 0.5 mg  0.5 mg Nasogastric Q12H  
 oxyCODONE IR (ROXICODONE) tablet 10 mg  10 mg Per NG tube Q6H  
 furosemide (LASIX) tablet 80 mg  80 mg Per G Tube DAILY  loperamide (IMODIUM) 1 mg/7.5 mL oral solution 2 mg  2 mg Per NG tube QID PRN  
 insulin glargine (LANTUS) injection 15 Units  15 Units SubCUTAneous DAILY  magic mouthwash (ALYX) oral suspension 5 mL  5 mL Oral Q6H  
  pantoprazole (PROTONIX) 40 mg in 0.9% sodium chloride 10 mL injection  40 mg IntraVENous DAILY  Saccharomyces boulardii (FLORASTOR) capsule 250 mg  250 mg Per NG tube BID  LORazepam (ATIVAN) injection 1 mg  1 mg IntraVENous Q6H PRN  
 guaiFENesin (ROBITUSSIN) 100 mg/5 mL oral liquid 100 mg  100 mg Per NG tube Q4H PRN  
 insulin regular (NOVOLIN R, HUMULIN R) injection   SubCUTAneous Q6H  
 albuterol (PROVENTIL VENTOLIN) nebulizer solution 2.5 mg  2.5 mg Nebulization Q6H RT  
 HYDROmorphone (PF) (DILAUDID) injection 2 mg  2 mg IntraVENous Q3H PRN  
 dexmedeTOMidine in 0.9 % NaCl (PRECEDEX) 400 mcg/100 mL (4 mcg/mL) infusion soln  0.1-1.5 mcg/kg/hr IntraVENous TITRATE  hydrALAZINE (APRESOLINE) 20 mg/mL injection 10 mg  10 mg IntraVENous Q6H PRN  
 docusate (COLACE) 50 mg/5 mL oral liquid 100 mg  100 mg Per NG tube DAILY PRN  
 heparin (porcine) 1,000 unit/mL injection 5,000 Units  5,000 Units IntraVENous DIALYSIS PRN  
 alcohol 62% (NOZIN) nasal  1 Ampule  1 Ampule Topical Q12H  
 [Held by provider] heparin (porcine) injection 5,000 Units  5,000 Units SubCUTAneous Q8H  
 bisacodyL (DULCOLAX) suppository 10 mg  10 mg Rectal DAILY PRN  
 NUTRITIONAL SUPPORT ELECTROLYTE PRN ORDERS   Does Not Apply PRN  
 dextrose 40% (GLUTOSE) oral gel 1 Tube  15 g Oral PRN  
 glucagon (GLUCAGEN) injection 1 mg  1 mg IntraMUSCular PRN  
 dextrose (D50W) injection syrg 12.5-25 g  25-50 mL IntraVENous PRN  
 albuterol (PROVENTIL HFA, VENTOLIN HFA, PROAIR HFA) inhaler 2 Puff  2 Puff Inhalation Q4H PRN  phenol throat spray (CHLORASEPTIC) 1 Spray  1 Spray Oral PRN  
 lip protectant (BLISTEX) ointment 1 Each  1 Each Topical PRN  
 acetaminophen (TYLENOL) tablet 650 mg  650 mg Oral Q6H PRN  
 sodium chloride (NS) flush 5-40 mL  5-40 mL IntraVENous Q8H  
 sodium chloride (NS) flush 5-40 mL  5-40 mL IntraVENous PRN Review of Systems: cannot obtain due to patient condition Objective: Vitals:  
 02/20/21 7705 02/20/21 4917 02/20/21 4200 02/20/21 3562 BP: 124/63 133/62 (!) 161/72 (!) 150/76 Pulse: (!) 59 61 66 68 Resp: 20 21 24 23 Temp:      
SpO2: 97% 98% 99% 99% Weight:      
Height:      
 
 
 
Physical Exam:         
Constitutional:  Intubated and mechanically ventilated. EENMT:  Sclera clear, pupils equal. Eyes not open today. Respiratory: Rhonchi bilaterally and equal. Currently on VC. Respirations not labored at present Cardiovascular:  RRR with no M,G,R; sinus per telemetry Gastrointestinal:  soft with no evidence of tenderness; positive bowel sounds present Musculoskeletal:  warm with no cyanosis, minimal edema Skin:  no jaundice or ecchymosis Neurologic: Grimaces with stimulation Psychiatric:  Cannot assess LINES:  Trach, central line, coyle,  NAZIA 
 
DRIPS:  TF, precedex CXR:  
02/20/2021 None today 02/15/2021 Ventilator Settings Mode FIO2 Rate Tidal Volume Pressure PEEP  
VC+  35 % 24 430 mls 12 cm H2O  8 cm H20 Peak airway pressure: 30 cm H2O Minute ventilation: 8.45 l/min ABG:  
Recent Labs  
  02/20/21 
0212 02/19/21 
0400 PHI 7.44 7.61* PCO2I 62.4* 43.4 PO2I 82 72* HCO3I 42.1* 43.7* LAB Recent Labs  
  02/20/21 
0707 02/19/21 
2332 02/19/21 
1708 02/19/21 
1139 02/19/21 
2487 GLUCPOC 270* 185* 244* 272* 188* Recent Labs  
  02/19/21 
0500 WBC 11.2* HGB 9.1*  
HCT 28.3*  
 Recent Labs  
  02/19/21 
0500   
K 3.6 CL 97* CO2 42* * BUN 72* CREA 1.54* MG 1.9 PHOS 4.2*  
CA 9.6 Assessment:  (Medical Decision Making) Patient Active Problem List  
Diagnosis Code  Diabetes mellitus with hyperosmolarity without hyperglycemic hyperosmolar nonketotic coma ([facility] [place] [address] E11.00  Coronary artery disease involving coronary bypass graft of native heart without angina pectoris I25.810  
 Uncontrolled type 2 diabetes mellitus with hyperglycemia (HCC) E11.65  
  Essential hypertension I10  
 HLD (hyperlipidemia) E78.5  Chronic kidney disease, stage III (moderate) N18.30  
 Osteopenia M85.80  Vitamin D deficiency E55.9  Gastroesophageal reflux disease without esophagitis K21.9  Other allergic rhinitis J30.89  Peripheral neuropathy G62.9  
 Primary osteoarthritis involving multiple joints M89.49  
 Insomnia G47.00  RLS (restless legs syndrome) G25.81  
 Anemia D64.9  Generalized weakness R53.1  Decreased oral intake R63.8  Acute on chronic renal failure (HCC) N17.9, N18.9  Noncompliance Z91.19  
 Hypoalbuminemia due to protein-calorie malnutrition ([facility] [address] E88.09, E46  
 COVID-19 U07.1  Acute respiratory distress syndrome (ARDS) due to severe acute respiratory syndrome coronavirus 2 (SARS-CoV-2) ([facility]) U07.1, J80  Acute hypoxemic respiratory failure ([facility]) J96.01  
 Cardiac arrest ([facility]) I46.9  
 Encephalopathy G93.40 Plan:  (Medical Decision Making) Hospital Problems  Date Reviewed: 2/5/2021 Codes Class Noted POA Encephalopathy ICD-10-CM: G93.40 ICD-9-CM: 348.30  2/7/2021 Unknown Grimacing today but eyes not open. On precedex and using prn Dilaudid to help control resp rate Cardiac arrest [facility]) ICD-10-CM: I46.9 ICD-9-CM: 427.5  12/30/2020 No  
 No signs of hypoxic brain injury per neurology COVID-19 ICD-10-CM: U07.1 ICD-9-CM: 079.89  12/24/2020 Yes Off isolation * (Principal) Acute respiratory distress syndrome (ARDS) due to severe acute respiratory syndrome coronavirus 2 (SARS-CoV-2) ([facility]) ICD-10-CM: U07.1, J80 
ICD-9-CM: 518.82, 079.89  12/24/2020 No  
 Remains on vent support -VC at present and looks comfortable this AM  
 Acute hypoxemic respiratory failure ([facility] [address] ICD-10-CM: J96.01 
ICD-9-CM: 518.81  12/24/2020 Yes As above Acute on chronic renal failure (HCC) ICD-10-CM: N17.9, N18.9 ICD-9-CM: 584.9, 585.9  9/17/2019 Yes Stable off dialysis Coronary artery disease involving coronary bypass graft of native heart without angina pectoris (Chronic) ICD-10-CM: Z95.170 ICD-9-CM: 414.05  6/15/2015 Yes  
 stable Uncontrolled type 2 diabetes mellitus with hyperglycemia (HCC) (Chronic) ICD-10-CM: E11.65 ICD-9-CM: 250.02  6/15/2015 Yes BS upper 100s to 200s today Diabetes mellitus with hyperosmolarity without hyperglycemic hyperosmolar nonketotic coma (HCC) (Chronic) ICD-10-CM: E11.00 ICD-9-CM: 250.20  11/24/2014 Yes  
   
  
--remains on precedex for RR rate. --[facility] transfer as they wanted to escalate care there and medical staff here, along her with daughter Cesar Lomas, want to leave category status as it is. --No plan for PEG 
--WBC down,  Sputum with GNRs - has only grown MRSA in the past. Per chart, after discussion with Cesar Lomas, no plans to treat. --Hemoglobin has been stable. Transfused last weekend but no plans to transfuse again per family decision. Started on Protonix empirically but no hemocult sent. Cont to hold subq heparin for now 
--Labs M,W, F for now - may decide to stop monitoring soon. More than 50% of the time documented was spent in face-to-face contact with the patient and in the care of the patient on the floor/unit where the patient is located. Shena Niece, NP Lungs:  coarse Heart:  RRR with no Murmur/Rubs/Gallops Additional Comments:  As above, no change I have spoken with and examined the patient. I agree with the above assessment and plan as documented.  
 
Grabiel Stephens MD

## 2021-02-20 NOTE — PROGRESS NOTES
Bedside and Verbal shift change report given to Deja Benjamin RN (oncoming nurse) by Eliazar Kilgore (offgoing nurse). Report included the following information SBAR, Kardex, ED Summary, Intake/Output, MAR, Recent Results and Cardiac Rhythm NSR/SB. Pt with trach on vent volume control 35%. Pt opens eyes and moves all extremities spontaneously but non purposefully. NGT in place with TF infusing. NSR/SB on monitor HR 60 and BP stable. Lung sounds wet and coarse. Stomach soft upon palpation with active bowel sounds. Clifton in place. SCD's to BLE. Precedex @ 1.5

## 2021-02-20 NOTE — PROGRESS NOTES
Ventilator check complete; patient has a #6 tracheostomy. Patient is sedated. Patient is not able to follow commands. Breath sounds are diminished. Trachea is midline, Negative for subcutaneous air, and chest excursion is symmetric. Patient is also Negative for cyanosis. All alarms are set and audible. Resuscitation bag is at the head of the bed. Ventilator Settings Mode FIO2 Rate Tidal Volume Pressure PEEP I:E Ratio VC+  35 %    400 ml    8 cm H20  1:2.75 Peak airway pressure: 30 cm H2O Minute ventilation: 8.45 l/min ABG: No results for input(s): PH, PCO2, PO2, HCO3 in the last 72 hours.  
 
 
Daysi Berrios, RT

## 2021-02-21 NOTE — PROGRESS NOTES
Ventilator check complete; patient has a #6 tracheostomy. Patient is sedated. Patient is not able to follow commands. Breath sounds are diminished. Trachea is midline, Negative for subcutaneous air, and chest excursion is symmetric. Patient is also Negative for cyanosis. All alarms are set and audible. Resuscitation bag is at the head of the bed. Ventilator Settings Mode FIO2 Rate Tidal Volume Pressure PEEP I:E Ratio VC+  35 %   20 400 ml    8 cm H20  1:2.75 Peak airway pressure: 42 cm H2O Minute ventilation: 8.53 l/min ABG: No results for input(s): PH, PCO2, PO2, HCO3 in the last 72 hours.  
 
 
Oriana Copeland, RT

## 2021-02-21 NOTE — PROGRESS NOTES
Critical Care Daily Progress Note: 2/21/2021 Admission Date: 12/24/2020 The patient's chart is reviewed and the patient is discussed with the staff. 77 yo female admitted 12/24 with acute respiratory failure secondary to COVID 19.  Pt was treated with Decadron and convalescent plasma. She had PRATIMA and was not treated with Remdesivir.  She was initially on BiPAP but pt had a  cardiac arrest on 12/30 requiring intubation.  She has remained vent dependent and a trach was placed on 1/19. Pt has been treated for an E coli UTII as well as MRSA in her sputum. He developed acute on chronic renal failure and started on dialysis on 1/12 but now off of since 1/25 and there are no plans to restart. Dustin Gonzalez consulted for PEG but this on hold pending family discussion regarding need for long term care. Pt was seen by Neurology with an abnormal EEG with moderate to severe slowing. Brain MRI 1/26 revealed stable age-related senescent changes and chronic microvascular disease with remote right posterior temporal occipital ischemic insult. No evidence anoxic brain injury per neuro - deep encephalopathy. Has required IV sedation for prolonged periods due to tachypnea. Subjective:  
No changes overnight. Remains on precedex for sedation. Increased RRR if stopped. Grimaces to stimulation, but does not track or follow commands. Moves spontaneously Current Facility-Administered Medications Medication Dose Route Frequency  risperiDONE (RisperDAL) 1 mg/mL oral solution soln 0.5 mg  0.5 mg Nasogastric Q12H  
 oxyCODONE IR (ROXICODONE) tablet 10 mg  10 mg Per NG tube Q6H  
 furosemide (LASIX) tablet 80 mg  80 mg Per G Tube DAILY  loperamide (IMODIUM) 1 mg/7.5 mL oral solution 2 mg  2 mg Per NG tube QID PRN  
 insulin glargine (LANTUS) injection 15 Units  15 Units SubCUTAneous DAILY  magic mouthwash (ALYX) oral suspension 5 mL  5 mL Oral Q6H  
  pantoprazole (PROTONIX) 40 mg in 0.9% sodium chloride 10 mL injection  40 mg IntraVENous DAILY  Saccharomyces boulardii (FLORASTOR) capsule 250 mg  250 mg Per NG tube BID  LORazepam (ATIVAN) injection 1 mg  1 mg IntraVENous Q6H PRN  
 guaiFENesin (ROBITUSSIN) 100 mg/5 mL oral liquid 100 mg  100 mg Per NG tube Q4H PRN  
 insulin regular (NOVOLIN R, HUMULIN R) injection   SubCUTAneous Q6H  
 albuterol (PROVENTIL VENTOLIN) nebulizer solution 2.5 mg  2.5 mg Nebulization Q6H RT  
 HYDROmorphone (PF) (DILAUDID) injection 2 mg  2 mg IntraVENous Q3H PRN  
 dexmedeTOMidine in 0.9 % NaCl (PRECEDEX) 400 mcg/100 mL (4 mcg/mL) infusion soln  0.1-1.5 mcg/kg/hr IntraVENous TITRATE  hydrALAZINE (APRESOLINE) 20 mg/mL injection 10 mg  10 mg IntraVENous Q6H PRN  
 docusate (COLACE) 50 mg/5 mL oral liquid 100 mg  100 mg Per NG tube DAILY PRN  
 heparin (porcine) 1,000 unit/mL injection 5,000 Units  5,000 Units IntraVENous DIALYSIS PRN  
 alcohol 62% (NOZIN) nasal  1 Ampule  1 Ampule Topical Q12H  
 [Held by provider] heparin (porcine) injection 5,000 Units  5,000 Units SubCUTAneous Q8H  
 bisacodyL (DULCOLAX) suppository 10 mg  10 mg Rectal DAILY PRN  
 NUTRITIONAL SUPPORT ELECTROLYTE PRN ORDERS   Does Not Apply PRN  
 dextrose 40% (GLUTOSE) oral gel 1 Tube  15 g Oral PRN  
 glucagon (GLUCAGEN) injection 1 mg  1 mg IntraMUSCular PRN  
 dextrose (D50W) injection syrg 12.5-25 g  25-50 mL IntraVENous PRN  
 albuterol (PROVENTIL HFA, VENTOLIN HFA, PROAIR HFA) inhaler 2 Puff  2 Puff Inhalation Q4H PRN  phenol throat spray (CHLORASEPTIC) 1 Spray  1 Spray Oral PRN  
 lip protectant (BLISTEX) ointment 1 Each  1 Each Topical PRN  
 acetaminophen (TYLENOL) tablet 650 mg  650 mg Oral Q6H PRN  
 sodium chloride (NS) flush 5-40 mL  5-40 mL IntraVENous Q8H  
 sodium chloride (NS) flush 5-40 mL  5-40 mL IntraVENous PRN Review of Systems: cannot obtain due to patient condition Objective: Vitals:  
 02/21/21 0600 02/21/21 0630 02/21/21 8606 02/21/21 9185 BP: (!) 150/67 (!) 123/58 128/62 (!) 119/57 Pulse: 67 71 69 61 Resp: 20 20 19 20 Temp:      
SpO2: 98% 97% 98% 98% Weight:      
Height:      
 
 
 
Physical Exam:         
Constitutional:  Intubated and mechanically ventilated. EENMT:  Sclera clear, pupils equal. Eyes not open today. Respiratory: Rhonchi bilaterally and equal. Currently on VC. Respirations not labored at present Cardiovascular:  RRR with no M,G,R; sinus per telemetry Gastrointestinal:  soft with no evidence of tenderness; positive bowel sounds present Musculoskeletal:  warm with no cyanosis, minimal edema Skin:  no jaundice or ecchymosis Neurologic: Grimaces with stimulation Psychiatric:  Cannot assess LINES:  Trach, central line, NAZIA coyle 
 
DRIPS:  TF, precedex CXR:  
02/20/2021 None today 02/15/2021 Ventilator Settings Mode FIO2 Rate Tidal Volume Pressure PEEP  
VC+  35 % 24 430 mls 12 cm H2O  8 cm H20 Peak airway pressure: 32 cm H2O Minute ventilation: 8.16 l/min ABG:  
Recent Labs  
  02/20/21 
0212 02/19/21 
0400 PHI 7.44 7.61* PCO2I 62.4* 43.4 PO2I 82 72* HCO3I 42.1* 43.7* LAB Recent Labs  
  02/21/21 
0532 02/21/21 
0105 02/20/21 
1738 02/20/21 
1206 02/20/21 
2725 GLUCPOC 252* 182* 173* 279* 270* Recent Labs  
  02/19/21 
0500 WBC 11.2* HGB 9.1*  
HCT 28.3*  
 Recent Labs  
  02/19/21 
0500   
K 3.6 CL 97* CO2 42* * BUN 72* CREA 1.54* MG 1.9 PHOS 4.2*  
CA 9.6 Assessment:  (Medical Decision Making) Patient Active Problem List  
Diagnosis Code  Diabetes mellitus with hyperosmolarity without hyperglycemic hyperosmolar nonketotic coma ([facility] [address] E11.00  Coronary artery disease involving coronary bypass graft of native heart without angina pectoris I25.810  
 Uncontrolled type 2 diabetes mellitus with hyperglycemia (HCC) E11.65  
  Essential hypertension I10  
 HLD (hyperlipidemia) E78.5  Chronic kidney disease, stage III (moderate) N18.30  
 Osteopenia M85.80  Vitamin D deficiency E55.9  Gastroesophageal reflux disease without esophagitis K21.9  Other allergic rhinitis J30.89  Peripheral neuropathy G62.9  
 Primary osteoarthritis involving multiple joints M89.49  
 Insomnia G47.00  RLS (restless legs syndrome) G25.81  
 Anemia D64.9  Generalized weakness R53.1  Decreased oral intake R63.8  Acute on chronic renal failure (HCC) N17.9, N18.9  Noncompliance Z91.19  
 Hypoalbuminemia due to protein-calorie malnutrition ([facility] [address] E88.09, E46  
 COVID-19 U07.1  Acute respiratory distress syndrome (ARDS) due to severe acute respiratory syndrome coronavirus 2 (SARS-CoV-2) ([facility]) U07.1, J80  Acute hypoxemic respiratory failure ([facility]) J96.01  
 Cardiac arrest ([facility]) I46.9  
 Encephalopathy G93.40 Plan:  (Medical Decision Making) Hospital Problems  Date Reviewed: 2/5/2021 Codes Class Noted POA Encephalopathy ICD-10-CM: G93.40 ICD-9-CM: 348.30  2/7/2021 Unknown Cardiac arrest [facility]) ICD-10-CM: I46.9 ICD-9-CM: 427.5  12/30/2020 No  
   
 COVID-19 ICD-10-CM: U07.1 ICD-9-CM: 079.89  12/24/2020 Yes * (Principal) Acute respiratory distress syndrome (ARDS) due to severe acute respiratory syndrome coronavirus 2 (SARS-CoV-2) ([facility]) ICD-10-CM: U07.1, J80 
ICD-9-CM: 518.82, 079.89  12/24/2020 No  
   
 Acute hypoxemic respiratory failure ([facility] [address] ICD-10-CM: J96.01 
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 
 ICD-9-CM: 250.02  6/15/2015 Yes Diabetes mellitus with hyperosmolarity without hyperglycemic hyperosmolar nonketotic coma (HCC) (Chronic) ICD-10-CM: E11.00 ICD-9-CM: 250.20  11/24/2014 Yes  
   
  
 
 
  
- 
 
Plan:  
-remains on precedex for RR rate  
--Canceled [facility] transfer as they wanted to escalate care there and medical staff here, along her with daughter Major Vasquez, want to leave category status as it is. --No plan for PEG, feeding tube had to be replaced yesterday --Labs M,W, F for now - may decide to stop monitoring soon. --remains hyperglycemic. Adjusted lantus to 20 units qHS  
--DNR More than 50% of the time documented was spent in face-to-face contact with the patient and in the care of the patient on the floor/unit where the patient is located. Zehra Akins NP Lungs:  coarse Heart:  RRR with no Murmur/Rubs/Gallops Additional Comments: Will increase Risperdal so hopefully we can wean off Precedex gtt, will also placed fentanyl patch I have spoken with and examined the patient. I agree with the above assessment and plan as documented.  
 
Audelia Saavedra MD

## 2021-02-21 NOTE — PROGRESS NOTES
Ventilator check complete; patient has a #6 XLT proximal shiley tracheostomy. Patient is sedated. Patient is not able to follow commands. Breath sounds are coarse. Trachea is midline, Negative for subcutaneous air, and chest excursion is symmetric. Patient is also Negative for cyanosis and is Negative for pitting edema. All alarms are set and audible. Resuscitation bag is at the head of the bed. Ventilator Settings Mode FIO2 Rate Tidal Volume Pressure PEEP I:E Ratio VC+  35 %    400 ml  12 cm H2O  8 cm H20  1:2.75 Peak airway pressure: 33 cm H2O Minute ventilation: 9.14 l/min Eric Ruffin RT

## 2021-02-21 NOTE — PROGRESS NOTES
Bedside and Verbal shift change report given to Yuni Walls RN (oncoming nurse) by Nona Lugo (offgoing nurse). Report included the following information SBAR, Kardex, ED Summary, Intake/Output, MAR, Recent Results and Cardiac Rhythm NSR/SB.  
  
Pt with trach on vent volume control 35%. Pt opens eyes and moves all extremities spontaneously. NGT in place with TF infusing. NSR/SB on monitor HR 69 and BP stable. Lung sounds wet and coarse. Stomach soft upon palpation with active bowel sounds. Clifton in place. SCD's to BLE. 
  
Precedex @ 1.5

## 2021-02-22 NOTE — PROGRESS NOTES
Critical Care Daily Progress Note: 2/22/2021 Admission Date: 12/24/2020 The patient's chart is reviewed and the patient is discussed with the staff. 79 yo female admitted 12/24 with acute respiratory failure secondary to COVID 19.  Pt was treated with Decadron and convalescent plasma. She had PRATIMA and was not treated with Remdesivir.  She was initially on BiPAP but pt had a  cardiac arrest on 12/30 requiring intubation.  She has remained vent dependent and a trach was placed on 1/19. Pt has been treated for an E coli UTII as well as MRSA in her sputum. He developed acute on chronic renal failure and started on dialysis on 1/12 but now off of since 1/25 and there are no plans to restart. Meche Ascencio consulted for PEG but this on hold pending family discussion regarding need for long term care. Pt was seen by Neurology with an abnormal EEG with moderate to severe slowing. Brain MRI 1/26 revealed stable age-related senescent changes and chronic microvascular disease with remote right posterior temporal occipital ischemic insult. No evidence anoxic brain injury per neuro - deep encephalopathy. Has required IV sedation for prolonged periods due to tachypnea. Subjective:  
 
Risperdal increased yesterday and fentanyl patch applied. Remains on precedex. Calm at present. Current Facility-Administered Medications Medication Dose Route Frequency  insulin glargine (LANTUS) injection 20 Units  20 Units SubCUTAneous DAILY  risperiDONE (RisperDAL) 1 mg/mL oral solution soln 1 mg  1 mg Nasogastric Q12H  
 fentaNYL (DURAGESIC) 50 mcg/hr patch 1 Patch  1 Patch TransDERmal Q72H  
 oxyCODONE IR (ROXICODONE) tablet 10 mg  10 mg Per NG tube Q6H  
 furosemide (LASIX) tablet 80 mg  80 mg Per G Tube DAILY  loperamide (IMODIUM) 1 mg/7.5 mL oral solution 2 mg  2 mg Per NG tube QID PRN  
 magic mouthwash (ALYX) oral suspension 5 mL  5 mL Oral Q6H  
  pantoprazole (PROTONIX) 40 mg in 0.9% sodium chloride 10 mL injection  40 mg IntraVENous DAILY  Saccharomyces boulardii (FLORASTOR) capsule 250 mg  250 mg Per NG tube BID  LORazepam (ATIVAN) injection 1 mg  1 mg IntraVENous Q6H PRN  
 guaiFENesin (ROBITUSSIN) 100 mg/5 mL oral liquid 100 mg  100 mg Per NG tube Q4H PRN  
 insulin regular (NOVOLIN R, HUMULIN R) injection   SubCUTAneous Q6H  
 albuterol (PROVENTIL VENTOLIN) nebulizer solution 2.5 mg  2.5 mg Nebulization Q6H RT  
 HYDROmorphone (PF) (DILAUDID) injection 2 mg  2 mg IntraVENous Q3H PRN  
 dexmedeTOMidine in 0.9 % NaCl (PRECEDEX) 400 mcg/100 mL (4 mcg/mL) infusion soln  0.1-1.5 mcg/kg/hr IntraVENous TITRATE  hydrALAZINE (APRESOLINE) 20 mg/mL injection 10 mg  10 mg IntraVENous Q6H PRN  
 docusate (COLACE) 50 mg/5 mL oral liquid 100 mg  100 mg Per NG tube DAILY PRN  
 heparin (porcine) 1,000 unit/mL injection 5,000 Units  5,000 Units IntraVENous DIALYSIS PRN  
 alcohol 62% (NOZIN) nasal  1 Ampule  1 Ampule Topical Q12H  
 [Held by provider] heparin (porcine) injection 5,000 Units  5,000 Units SubCUTAneous Q8H  
 bisacodyL (DULCOLAX) suppository 10 mg  10 mg Rectal DAILY PRN  
 NUTRITIONAL SUPPORT ELECTROLYTE PRN ORDERS   Does Not Apply PRN  
 dextrose 40% (GLUTOSE) oral gel 1 Tube  15 g Oral PRN  
 glucagon (GLUCAGEN) injection 1 mg  1 mg IntraMUSCular PRN  
 dextrose (D50W) injection syrg 12.5-25 g  25-50 mL IntraVENous PRN  
 albuterol (PROVENTIL HFA, VENTOLIN HFA, PROAIR HFA) inhaler 2 Puff  2 Puff Inhalation Q4H PRN  phenol throat spray (CHLORASEPTIC) 1 Spray  1 Spray Oral PRN  
 lip protectant (BLISTEX) ointment 1 Each  1 Each Topical PRN  
 acetaminophen (TYLENOL) tablet 650 mg  650 mg Oral Q6H PRN  
 sodium chloride (NS) flush 5-40 mL  5-40 mL IntraVENous Q8H  
 sodium chloride (NS) flush 5-40 mL  5-40 mL IntraVENous PRN Review of Systems: cannot obtain due to patient condition Objective: Vitals:  
 02/22/21 3285 02/22/21 3407 02/22/21 3126 02/22/21 4317 BP: 133/68 117/61 121/61 (!) 115/56 Pulse: 79 81 80 77 Resp: 22 14 21 15 Temp:      
SpO2: 98% 96% 97% 97% Weight:      
Height:      
 
 
 
Physical Exam:         
Constitutional:  trached and mechanically ventilated. EENMT:  Sclera clear, pupils equal. Eyes not open today. Respiratory: Rhonchi bilaterally and equal. Currently on PS. Respirations not labored at present Cardiovascular:  RRR with no M,G,R; sinus per telemetry Gastrointestinal:  soft with no evidence of tenderness; positive bowel sounds present Musculoskeletal:  warm with no cyanosis, minimal edema Skin:  no jaundice or ecchymosis Neurologic: Grimaces with stimulation Psychiatric:  Cannot assess LINES:  Trach, central line, coyle,  NG 
 
DRIPS:  TF, precedex CXR:  
02/20/2021 None today 02/15/2021 Ventilator Settings Mode FIO2 Rate Tidal Volume Pressure PEEP  
VC+  35 % 24 430 mls 12 cm H2O  8 cm H20 Peak airway pressure: 16 cm H2O Minute ventilation: 11.9 l/min ABG:  
Recent Labs  
  02/20/21 
0212 PHI 7.44  
PCO2I 62.4*  
PO2I 82 HCO3I 42.1*  
  
 
LAB Recent Labs  
  02/22/21 
0845 02/21/21 
2338 02/21/21 
1716 02/21/21 
1116 02/21/21 
0532 GLUCPOC 180* 170* 137* 270* 252* Recent Labs  
  02/22/21 
0459 WBC 12.7* HGB 8.8* HCT 27.4*  
 Recent Labs  
  02/22/21 
0459   
K 4.0  
CL 95* CO2 41* * BUN 71* CREA 1.43* CA 9.6 Assessment:  (Medical Decision Making) Hospital Problems  Date Reviewed: 2/5/2021 Codes Class Noted POA Encephalopathy ICD-10-CM: G93.40 ICD-9-CM: 348.30  2/7/2021 Unknown Ongoing Cardiac arrest [facility]) ICD-10-CM: I46.9 ICD-9-CM: 427.5  12/30/2020 No  
   
 COVID-19 ICD-10-CM: U07.1 ICD-9-CM: 079.89  12/24/2020 Yes Completed Rx * (Principal) Acute respiratory distress syndrome (ARDS) due to severe acute respiratory syndrome coronavirus 2 (SARS-CoV-2) ([facility]) ICD-10-CM: U07.1, J80 
ICD-9-CM: 518.82, 079.89  12/24/2020 No  
 DBI persist  
 Acute hypoxemic respiratory failure ([facility] [address] ICD-10-CM: J96.01 
ICD-9-CM: 518.81  12/24/2020 Yes Still on vent. Acute on chronic renal failure (HCC) ICD-10-CM: N17.9, N18.9 ICD-9-CM: 584.9, 585.9  9/17/2019 Yes Cr 1.43 Coronary artery disease involving coronary bypass graft of native heart without angina pectoris (Chronic) ICD-10-CM: O88.125 ICD-9-CM: 414.05  6/15/2015 Yes Uncontrolled type 2 diabetes mellitus with hyperglycemia (HCC) (Chronic) ICD-10-CM: E11.65 ICD-9-CM: 250.02  6/15/2015 Yes Diabetes mellitus with hyperosmolarity without hyperglycemic hyperosmolar nonketotic coma (HCC) (Chronic) ICD-10-CM: E11.00 ICD-9-CM: 250.20  11/24/2014 Yes Plan:  (Medical Decision Making) Wean precedex as able. Continue risperdal and fentanyl patch. Continue lasix. Continue lantus and SSI. DNR with dismal prognosis. More than 50% of the time documented was spent in face-to-face contact with the patient and in the care of the patient on the floor/unit where the patient is located.  
 
Cindy Zamora MD

## 2021-02-22 NOTE — PROGRESS NOTES
Spiritual Care Visit, initial visit. Attempted to visit with patient at bedside. Patient did not respond. Tammy Brooke Visit by Kathia Jerez, Staff .  SEVERO.Femi., .B., B.A.

## 2021-02-22 NOTE — PROGRESS NOTES
Ventilator check complete; patient has a #6 proximal XLT shiley tracheostomy. Patient is sedated. Patient is not able to follow commands. Breath sounds are coarse. Trachea is midline, Negative for subcutaneous air, and chest excursion is symmetric. Patient is also Negative for cyanosis and is Negative for pitting edema. All alarms are set and audible. Resuscitation bag is at the head of the bed. Ventilator Settings Mode FIO2 Rate Tidal Volume Pressure PEEP I:E Ratio VC+  35 %    400 ml  12 cm H2O  8 cm H20  1:2.75 Peak airway pressure: 17 cm H2O Minute ventilation: 10.4 l/min Miriam Ling, RT

## 2021-02-22 NOTE — PROGRESS NOTES
Care Management Interventions PCP Verified by CM: Katia Manuel MD) Mode of Transport at Discharge: Other (see comment)(To [facility]) Transition of Care Consult (CM Consult): Discharge Planning Discharge Durable Medical Equipment: No 
Physical Therapy Consult: No 
Occupational Therapy Consult: No 
Speech Therapy Consult: Yes Current Support Network: Lives with Caregiver Confirm Follow Up Transport: Family The Plan for Transition of Care is Related to the Following Treatment Goals : LTAC The Patient and/or Patient Representative was Provided with a Choice of Provider and Agrees with the Discharge Plan?: Yes Name of the Patient Representative Who was Provided with a Choice of Provider and Agrees with the Discharge Plan: Kalpana, Ridge Hawkins and Trevin Harry Freedom of Choice List was Provided with Basic Dialogue that Supports the Patient's Individualized Plan of Care/Goals, Treatment Preferences and Shares the Quality Data Associated with the Providers?: Yes [employer] Information Provided?: No 
Discharge Location Discharge Placement: Unable to determine at this time CM reviewed weekend clinical notes. Pt remains on vent via trach, tube feeding via NG, precedex for sedation. Pt no longer an LTAC candidate. DNR.

## 2021-02-22 NOTE — PROGRESS NOTES
Ventilator check complete; patient has a #6 trach. Patient is not sedated. Patient is able to follow commands. Breath sounds are coarse. Trachea is midline, Negative for subcutaneous air, and chest excursion is symmetric. Patient is also Negative for cyanosis and is Negative for pitting edema. All alarms are set and audible. Resuscitation bag is at the head of the bed. Ventilator Settings Mode FIO2 Rate Tidal Volume Pressure PEEP I:E Ratio VC+  35 %    400 ml  12 cm H2O  8 cm H20  1:2.75 Peak airway pressure: 20 cm H2O Minute ventilation: 12.6 l/min ABG: No results for input(s): PH, PCO2, PO2, HCO3 in the last 72 hours. Alona Curtis

## 2021-02-23 NOTE — PROGRESS NOTES
Spiritual Care Visit, initial visit. Visited with patient's daughter at bedside. Prayed for patient's healing and health. Visit by Kathia Jerez, Staff .  Pelon., Th.B., B.A.

## 2021-02-23 NOTE — PROGRESS NOTES
So far able to titrate Precedex gtt down, pt follows commands but still drowsy, on spontaneous vent setting and tolerating well.

## 2021-02-23 NOTE — PROGRESS NOTES
Ventilator check complete; patient has a #6 proximal XLTprox. shiley tracheostomy. Patient is sedated. Patient is not able to follow commands. Breath sounds are coarse UAW but mainly CTAB after ETS. Trachea is midline, Negative for subcutaneous air, and chest excursion is symmetrical. Patient is also Negative for cyanosis and is Negative for pitting edema. All alarms are set and audible. Resuscitation bag and mask are at the head of the bed.   
  
Ventilator Settings Mode FIO2 Rate Tidal Volume Pressure PEEP I:E Ratio PSV  35 %   spont 23 avg 400 ml  12 cm H2O  8 cm H20  varies  
  
Peak airway pressure: 23 cm H2O Minute ventilation: 10.4 l/min

## 2021-02-23 NOTE — PROGRESS NOTES
Critical Care Daily Progress Note: 2/23/2021 Admission Date: 12/24/2020 The patient's chart is reviewed and the patient is discussed with the staff. 79 yo female admitted 12/24 with acute respiratory failure secondary to COVID 19.  Pt was treated with Decadron and convalescent plasma. She had PRATIMA and was not treated with Remdesivir.  She was initially on BiPAP but pt had a  cardiac arrest on 12/30 requiring intubation.  She has remained vent dependent and a trach was placed on 1/19. Pt has been treated for an E coli UTII as well as MRSA in her sputum. He developed acute on chronic renal failure and started on dialysis on 1/12 but now off of since 1/25 and there are no plans to restart. Ally Mazariegos consulted for PEG but this on hold pending family discussion regarding need for long term care. Pt was seen by Neurology with an abnormal EEG with moderate to severe slowing. Brain MRI 1/26 revealed stable age-related senescent changes and chronic microvascular disease with remote right posterior temporal occipital ischemic insult. No evidence anoxic brain injury per neuro - deep encephalopathy. Has required IV sedation for prolonged periods due to tachypnea. Risperdal increased and fentanyl patch applied. Subjective:  
 
Remains on precedex, ventilator. daughter Sharla Eugene at bedside and letting the patient Facetime with her sister. Excited that she is opening her eyes. Current Facility-Administered Medications Medication Dose Route Frequency  insulin glargine (LANTUS) injection 20 Units  20 Units SubCUTAneous DAILY  risperiDONE (RisperDAL) 1 mg/mL oral solution soln 1 mg  1 mg Nasogastric Q12H  
 fentaNYL (DURAGESIC) 50 mcg/hr patch 1 Patch  1 Patch TransDERmal Q72H  
 oxyCODONE IR (ROXICODONE) tablet 10 mg  10 mg Per NG tube Q6H  
 furosemide (LASIX) tablet 80 mg  80 mg Per G Tube DAILY  loperamide (IMODIUM) 1 mg/7.5 mL oral solution 2 mg  2 mg Per NG tube QID PRN  
  magic mouthwash (ALYX) oral suspension 5 mL  5 mL Oral Q6H  
 pantoprazole (PROTONIX) 40 mg in 0.9% sodium chloride 10 mL injection  40 mg IntraVENous DAILY  Saccharomyces boulardii (FLORASTOR) capsule 250 mg  250 mg Per NG tube BID  LORazepam (ATIVAN) injection 1 mg  1 mg IntraVENous Q6H PRN  
 guaiFENesin (ROBITUSSIN) 100 mg/5 mL oral liquid 100 mg  100 mg Per NG tube Q4H PRN  
 insulin regular (NOVOLIN R, HUMULIN R) injection   SubCUTAneous Q6H  
 albuterol (PROVENTIL VENTOLIN) nebulizer solution 2.5 mg  2.5 mg Nebulization Q6H RT  
 HYDROmorphone (PF) (DILAUDID) injection 2 mg  2 mg IntraVENous Q3H PRN  
 dexmedeTOMidine in 0.9 % NaCl (PRECEDEX) 400 mcg/100 mL (4 mcg/mL) infusion soln  0.1-1.5 mcg/kg/hr IntraVENous TITRATE  hydrALAZINE (APRESOLINE) 20 mg/mL injection 10 mg  10 mg IntraVENous Q6H PRN  
 docusate (COLACE) 50 mg/5 mL oral liquid 100 mg  100 mg Per NG tube DAILY PRN  
 heparin (porcine) 1,000 unit/mL injection 5,000 Units  5,000 Units IntraVENous DIALYSIS PRN  
 alcohol 62% (NOZIN) nasal  1 Ampule  1 Ampule Topical Q12H  
 [Held by provider] heparin (porcine) injection 5,000 Units  5,000 Units SubCUTAneous Q8H  
 bisacodyL (DULCOLAX) suppository 10 mg  10 mg Rectal DAILY PRN  
 NUTRITIONAL SUPPORT ELECTROLYTE PRN ORDERS   Does Not Apply PRN  
 dextrose 40% (GLUTOSE) oral gel 1 Tube  15 g Oral PRN  
 glucagon (GLUCAGEN) injection 1 mg  1 mg IntraMUSCular PRN  
 dextrose (D50W) injection syrg 12.5-25 g  25-50 mL IntraVENous PRN  
 albuterol (PROVENTIL HFA, VENTOLIN HFA, PROAIR HFA) inhaler 2 Puff  2 Puff Inhalation Q4H PRN  phenol throat spray (CHLORASEPTIC) 1 Spray  1 Spray Oral PRN  
 lip protectant (BLISTEX) ointment 1 Each  1 Each Topical PRN  
 acetaminophen (TYLENOL) tablet 650 mg  650 mg Oral Q6H PRN  
 sodium chloride (NS) flush 5-40 mL  5-40 mL IntraVENous Q8H  
 sodium chloride (NS) flush 5-40 mL  5-40 mL IntraVENous PRN  
 
 
 Review of Systems: cannot obtain due to patient condition Objective:  
 
Vitals:  
 02/23/21 4493 02/23/21 1875 02/23/21 3956 02/23/21 2029 BP: (!) 138/58 (!) 175/79 (!) 173/72 (!) 172/81 Pulse: 87 (!) 106 (!) 108 (!) 115 Resp: 20 27 (!) 34 (!) 36 Temp:      
SpO2: 98% 98% 97% 97% Weight:      
Height:      
 
 
 
Physical Exam:         
Constitutional:  trached and mechanically ventilated. EENMT:  Sclera clear, pupils equal. Opens eyes, NG in nose Respiratory: Rhonchi bilaterally and equal. Currently on PS. Cardiovascular:  RRR with no M,G,R; sinus per telemetry Gastrointestinal:  soft with no evidence of tenderness; positive bowel sounds present Musculoskeletal:  warm with no cyanosis, minimal edema Skin:  no jaundice or ecchymosis Neurologic: opens eyes, restless with daughter at bedside LINES:  Trach, central line, coyle,  NG 
 
DRIPS:  TF, precedex CXR:  
02/20/2021 None today 02/15/2021 Ventilator Settings Mode FIO2 Rate Tidal Volume Pressure PEEP  
VC+  35 % 24 430 mls 15 cm H2O  8 cm H20 Peak airway pressure: 23 cm H2O Minute ventilation: 9.71 l/min LAB Recent Labs  
  02/23/21 
0541 02/22/21 
2321 02/22/21 
1734 02/22/21 
1133 02/22/21 
0845 GLUCPOC 159* 167* 252* 161* 180* Recent Labs  
  02/22/21 
0459 WBC 12.7* HGB 8.8* HCT 27.4*  
 Recent Labs  
  02/22/21 
0459   
K 4.0  
CL 95* CO2 41* * BUN 71* CREA 1.43* CA 9.6 Assessment:  (Medical Decision Making) Hospital Problems  Date Reviewed: 2/5/2021 Codes Class Noted POA Encephalopathy ICD-10-CM: G93.40 ICD-9-CM: 348.30  2/7/2021 Unknown Ongoing Cardiac arrest [facility]) ICD-10-CM: I46.9 ICD-9-CM: 427.5  12/30/2020 No  
   
 COVID-19 ICD-10-CM: U07.1 ICD-9-CM: 079.89  12/24/2020 Yes Completed Rx * (Principal) Acute respiratory distress syndrome (ARDS) due to severe acute respiratory syndrome coronavirus 2 (SARS-CoV-2) ([facility]) ICD-10-CM: U07.1, J80 
ICD-9-CM: 518.82, 079.89  12/24/2020 No  
 DBI persist  
 Acute hypoxemic respiratory failure ([facility] [address] ICD-10-CM: J96.01 
ICD-9-CM: 518.81  12/24/2020 Yes Still on vent. Acute on chronic renal failure ([facility]) ICD-10-CM: N17.9, N18.9 ICD-9-CM: 584.9, 585.9  9/17/2019 Yes Cr 1.43 Coronary artery disease involving coronary bypass graft of native heart without angina pectoris (Chronic) ICD-10-CM: L48.296 ICD-9-CM: 414.05  6/15/2015 Yes Uncontrolled type 2 diabetes mellitus with hyperglycemia ([facility]) (Chronic) ICD-10-CM: E11.65 ICD-9-CM: 250.02  6/15/2015 Yes Diabetes mellitus with hyperosmolarity without hyperglycemic hyperosmolar nonketotic coma ([facility]) (Chronic) ICD-10-CM: E11.00 ICD-9-CM: 250.20  11/24/2014 Yes Prolonged respiratory failure post covid with ongoing need for life support and unchanged encephalopathy. Family rescinded comfort care on 2 occasions. No significant improvement; admitted 12/24. Plan:  (Medical Decision Making) Wean precedex as able, ? Any benefit at this point Stop lasix 80 mg IV, add diamox X 4 doses On fentanyl and oxycodone- change oxycodone to Q 8. Also on riperdal 
Continue lantus and SSI. DNR with dismal prognosis. Tolerating PS 8 for ~ 24 hours. Continue as able More than 50% of the time documented was spent in face-to-face contact with the patient and in the care of the patient on the floor/unit where the patient is located. Irineo Van NP Lungs:  Few trace rhonchi Heart:  RRR with no Murmur/Rubs/Gallops Neuro:  and moves R toes to command. Additional Comments:  May be a little better. FC. Agree with trying to wean precedex. Try on brief PS trials. I have spoken with and examined the patient. I agree with the above assessment and plan as documented. Sahra Edwards MD

## 2021-02-23 NOTE — PROGRESS NOTES
Ventilator check complete; patient has a #6. 0XLT P. Patient is not sedated. Patient is not able to follow commands. Breath sounds are coarse to clear with sx. Trachea is midline, Negative for subcutaneous air, and chest excursion is symmetric. Patient is also Negative for cyanosis and is Negative for pitting edema. All alarms are set and audible. Resuscitation bag is at the head of the bed. Ventilator Settings Mode FIO2 Rate Tidal Volume Pressure PEEP I:E Ratio VC+  35 %    0.4 ml  15 cm H2O  8 cm H20  1:2.75 Peak airway pressure: 23 cm H2O Minute ventilation: 8.82 l/min ABG: No results for input(s): PH, PCO2, PO2, HCO3 in the last 72 hours. Liss Downey

## 2021-02-24 NOTE — PROGRESS NOTES
Ventilator check complete; patient has a #6XLT tracheostomy. Patient is not sedated. Patient is not able to follow commands. Breath sounds are diminished. Trachea is midline, Negative for subcutaneous air, and chest excursion is symmetric. Patient is also Negative for cyanosis and is Negative for pitting edema. All alarms are set and audible. Resuscitation bag is at the head of the bed. Ventilator Settings Mode FIO2 Rate Tidal Volume Pressure PEEP I:E Ratio Pressure support  40 %    0.4 ml  8 cm H2O  8 cm H20  1:2.75 Peak airway pressure: 17 cm H2O Minute ventilation: 13.6 l/min [facility],

## 2021-02-24 NOTE — PROGRESS NOTES
Tried patient in CPAP mode for an hour. Her work of breathing increased and respiratory rate went up to 34. Switched her back to pressure support. Not going to try trach collar trial today.

## 2021-02-24 NOTE — PROGRESS NOTES
Comprehensive Nutrition Assessment Type and Reason for Visit: Jade Rife Tube Feeding Management (Pulmonary) Nutrition Recommendations/Plan:  
· Continue current TF - Nepro @ 35 ml/hr with 1 packet ProSource TF daily and 50 ml/hr water flush. Regimen provides: 1426 kcal (100% needs), 73 g pro (100% needs), and 1760 ml free water - for 22 hr infusion. Malnutrition Assessment: 
Malnutrition Status: Insufficient data Nutrition Assessment:  
Nutrition History: 12/31: 3 recorded meals in past 6 days with average intake of 83% Nutrition Background: H/O: CAD, HTN, DM, HLD, peripheral neuropathy, CKD stage III. Presented with dyspnea. Findings of +COVID. Admitted to ICU with ARDS, severe acute hypoxic due to COVID PNA, PRATIMA on CKD. Was requiring BIPAP at night and Aviro during day. Had brief cardiac arrest and was intubated 12/30 Daily Update: 
Patient seen and discussed with RN. She is noted to be more alert, but she was sleeping at time of RD visit. Precedex has been weaned off. Lasix stopped yesterday and diamox x 4 doses added. Noted weight loss but likely related to resolving edema. Could not appreciate any wasting. Abdominal Status (last documented): Semi-soft abdomen with Active  bowel sounds. Last BM 02/23/21. Pertinent Medications: Ativan, MMW, Protonix, Florastor Pertinent Labs: Na 140, K 3.7, Glucose 1443, Ca 9.7 Nutrition Related Findings:  
Intubated and OGT placed 12/30. CRRT initiated on 1/12, last was 1/23. TF formula changed to a renal formula on 1/11. s/p Trach 1/19, NG placed 1/19. NGT replaced 1/29. Wound Type: Stage II(to elbow) Current Nutrition Therapies: DIET NPO 
DIET TUBE FEEDING Administer 1 pouches Prosource TF via open syringe into FT @ 1800 daily. Flush FT with 50 ml water before and after administration of protein. Current Tube Feeding (TF) Orders: · Feeding Route: Nasogastric · Formula: Nepro · Schedule:Continuous · Regimen: 35 ml/hr · Additives/Modulars: Protein(1 packet prosource TF per day with 50ml water bofore/after) · Water Flushes: 50 ml/hr · Current TF & Flush Orders Provides: at goal 
· Goal TF & Flush Orders Provides: ~6804 calories/day (100% calorie goal), 79 gm protein/day (100% protein goal) in ~1900 ml water/day Current Intake: Average Meal Intake: NPO Average Supplement Intake: NPO Additional Caloric Sources: ( ) Anthropometric Measures: 
Height: 5' 5\" (165.1 cm) Current Body Wt: 66.5 kg (146 lb 9.7 oz)(2/24), Weight source: Bed scale BMI: 24.4, Normal weight (BMI 22.0-24.9) age over 72 Admission Body Weight: 159 lb 2.8 oz(12/25 bed scale) Ideal Body Weight (lbs) (Calculated): 125 lbs (57 kg), 123.8 % Usual Body Wt: 73.5 kg (162 lb)(Per EMR 11/5/82020), Percent weight change: -4.5 Edema: Generalized: Trace (2/23/2021  7:13 PM) Estimated Daily Nutrient Needs: 
Energy (kcal/day): 9069-7571 (Kcal/kg(25-30), Weight Used: Ideal(56.8)) Protein (g/day): 68-86 (1.2-1.5 g/kg) Weight Used: (Ideal) Fluid (ml/day):   (1 ml/kcal) Nutrition Diagnosis:  
· Inadequate oral intake related to impaired respiratory function as evidenced by (intubated, NPO, TF to meet needs) Nutrition Interventions:  
Food and/or Nutrient Delivery: Continue NPO, Continue tube feeding Coordination of Nutrition Care: Continue to monitor while inpatient Plan of Care discussed with Herbert Giles Goals:  
Previous Goal Met: Goal(s) achieved Active Goal: Maintain TF to meet estimated needs Nutrition Monitoring and Evaluation:  
  
Food/Nutrient Intake Outcomes: Enteral nutrition intake/tolerance Physical Signs/Symptoms Outcomes: Biochemical data, GI status Discharge Planning: Too soon to determine [address] [place], LD on 2/24/2021 at 1:03 PM 
Contact: [phone] Disaster Mode active

## 2021-02-24 NOTE — PROGRESS NOTES
Care Management Interventions PCP Verified by CM: Zo Mead MD) Mode of Transport at Discharge: Other (see comment)(To [employer]) Transition of Care Consult (CM Consult): Discharge Planning Discharge Durable Medical Equipment: No 
Physical Therapy Consult: No 
Occupational Therapy Consult: No 
Speech Therapy Consult: Yes Current Support Network: Lives with Caregiver Confirm Follow Up Transport: Family The Plan for Transition of Care is Related to the Following Treatment Goals : LTAC The Patient and/or Patient Representative was Provided with a Choice of Provider and Agrees with the Discharge Plan?: Yes Name of the Patient Representative Who was Provided with a Choice of Provider and Agrees with the Discharge Plan: Daughters, Vivian Lopez and [facility] of Choice List was Provided with Basic Dialogue that Supports the Patient's Individualized Plan of Care/Goals, Treatment Preferences and Shares the Quality Data Associated with the Providers?: Yes [employer] Information Provided?: No 
Discharge Location Discharge Placement: Unable to determine at this time CM followed up with pt's primary nurse. Pt weaned from Precedex. Trach to vent. Respiratory attempted pt on CPAP but it was not tolerated. Pt had 2 visitors who acknowledge they are children of [facility] (patient's daughter). CM and Dr Roberto Leventhal spoke with them, answering their questions. Pt opens her eyes to name called. No movement of extremities to command. CM to continue to follow and assist with any CM needs.

## 2021-02-24 NOTE — PROGRESS NOTES
Ventilator check complete; patient has a #6 tracheostomy. Patient is sedated. Patient is not able to follow commands. Breath sounds are coarse and diminished. Trachea is midline, Negative for subcutaneous air, and chest excursion is symmetric. Patient is also Negative for cyanosis and is Positive for pitting edema. All alarms are set and audible. Resuscitation bag is at the head of the bed. Ventilator Settings Mode FIO2 Rate Tidal Volume Pressure PEEP I:E Ratio Pressure support  30 %    0.4 ml  8 cm H2O  8 cm H20  1:2.75 Peak airway pressure: 16 cm H2O Minute ventilation: 8.53 l/min Tory Johnson, RT

## 2021-02-25 NOTE — PROGRESS NOTES
Ventilator check complete; patient has a #6. 0XLT tracheostomy. Patient is sedated. Patient is not able to follow commands. Breath sounds are clear. Trachea is midline, Negative for subcutaneous air, and chest excursion is symmetric. Patient is also Negative for cyanosis and is Negative for pitting edema. All alarms are set and audible. Resuscitation bag is at the head of the bed. Ventilator Settings Mode FIO2 Rate Tidal Volume Pressure PEEP I:E Ratio Pressure support  30 %    0.4 ml  8 cm H2O  8 cm H20  1:1. Peak airway pressure: 17 cm H2O Minute ventilation: 12 l/min ABG: No results for input(s): PH, PCO2, PO2, HCO3 in the last 72 hours.  
 
 
Alexander Seats, RT

## 2021-02-25 NOTE — PROGRESS NOTES
Critical Care Daily Progress Note: 2/25/2021 Admission Date: 12/24/2020 The patient's chart is reviewed and the patient is discussed with the staff. 77 yo female admitted 12/24 with acute respiratory failure secondary to COVID 19.  Pt was treated with Decadron and convalescent plasma. She had PRATIMA and was not treated with Remdesivir.  She was initially on BiPAP but pt had a  cardiac arrest on 12/30 requiring intubation.  She has remained vent dependent and a trach was placed on 1/19. Pt has been treated for an E coli UTII as well as MRSA in her sputum. He developed acute on chronic renal failure and started on dialysis on 1/12 but now off of since 1/25 and there are no plans to restart. Dustin Gonzalez consulted for PEG but this on hold pending family discussion regarding need for long term care. Pt was seen by Neurology with an abnormal EEG with moderate to severe slowing. Brain MRI 1/26 revealed stable age-related senescent changes and chronic microvascular disease with remote right posterior temporal occipital ischemic insult. No evidence anoxic brain injury per neuro - deep encephalopathy. Has required IV sedation for prolonged periods due to tachypnea. Risperdal increased and fentanyl patch applied. Subjective: On PS for days and tried CPAP yesterday but only tolerated 1 hour. Now off all drips Looks comfortable on PS 8. Current Facility-Administered Medications Medication Dose Route Frequency  oxyCODONE IR (ROXICODONE) tablet 10 mg  10 mg Per NG tube Q8H PRN  
 insulin glargine (LANTUS) injection 20 Units  20 Units SubCUTAneous DAILY  risperiDONE (RisperDAL) 1 mg/mL oral solution soln 1 mg  1 mg Nasogastric Q12H  
 fentaNYL (DURAGESIC) 50 mcg/hr patch 1 Patch  1 Patch TransDERmal Q72H  loperamide (IMODIUM) 1 mg/7.5 mL oral solution 2 mg  2 mg Per NG tube QID PRN  
 magic mouthwash (ALYX) oral suspension 5 mL  5 mL Oral Q6H  
  pantoprazole (PROTONIX) 40 mg in 0.9% sodium chloride 10 mL injection  40 mg IntraVENous DAILY  Saccharomyces boulardii (FLORASTOR) capsule 250 mg  250 mg Per NG tube BID  LORazepam (ATIVAN) injection 1 mg  1 mg IntraVENous Q6H PRN  
 guaiFENesin (ROBITUSSIN) 100 mg/5 mL oral liquid 100 mg  100 mg Per NG tube Q4H PRN  
 insulin regular (NOVOLIN R, HUMULIN R) injection   SubCUTAneous Q6H  
 albuterol (PROVENTIL VENTOLIN) nebulizer solution 2.5 mg  2.5 mg Nebulization Q6H RT  
 HYDROmorphone (PF) (DILAUDID) injection 2 mg  2 mg IntraVENous Q3H PRN  
 hydrALAZINE (APRESOLINE) 20 mg/mL injection 10 mg  10 mg IntraVENous Q6H PRN  
 docusate (COLACE) 50 mg/5 mL oral liquid 100 mg  100 mg Per NG tube DAILY PRN  
 heparin (porcine) 1,000 unit/mL injection 5,000 Units  5,000 Units IntraVENous DIALYSIS PRN  
 alcohol 62% (NOZIN) nasal  1 Ampule  1 Ampule Topical Q12H  
 [Held by provider] heparin (porcine) injection 5,000 Units  5,000 Units SubCUTAneous Q8H  
 bisacodyL (DULCOLAX) suppository 10 mg  10 mg Rectal DAILY PRN  
 NUTRITIONAL SUPPORT ELECTROLYTE PRN ORDERS   Does Not Apply PRN  
 dextrose 40% (GLUTOSE) oral gel 1 Tube  15 g Oral PRN  
 glucagon (GLUCAGEN) injection 1 mg  1 mg IntraMUSCular PRN  
 dextrose (D50W) injection syrg 12.5-25 g  25-50 mL IntraVENous PRN  
 albuterol (PROVENTIL HFA, VENTOLIN HFA, PROAIR HFA) inhaler 2 Puff  2 Puff Inhalation Q4H PRN  phenol throat spray (CHLORASEPTIC) 1 Spray  1 Spray Oral PRN  
 lip protectant (BLISTEX) ointment 1 Each  1 Each Topical PRN  
 acetaminophen (TYLENOL) tablet 650 mg  650 mg Oral Q6H PRN  
 sodium chloride (NS) flush 5-40 mL  5-40 mL IntraVENous Q8H  
 sodium chloride (NS) flush 5-40 mL  5-40 mL IntraVENous PRN Review of Systems: cannot obtain due to patient condition Objective:  
 
Vitals:  
 02/25/21 0700 02/25/21 0759 02/25/21 0800 02/25/21 8677 BP: (!) 151/70 (!) 154/70 Pulse: 96 98 97 (!) 104 Resp: 22 22 24 29 Temp:      
SpO2: 99% 99% 99% 99% Weight:      
Height:      
 
 
 
Physical Exam:         
Constitutional:  trached and mechanically ventilated. EENMT:  Sclera clear, pupils equal.  
Respiratory: CTA on PS Cardiovascular:  RRR with no M,G,R; sinus per telemetry Gastrointestinal:  soft with no evidence of tenderness; positive bowel sounds present Musculoskeletal:  warm with no cyanosis, minimal edema Skin:  no jaundice or ecchymosis Neurologic: calm, comfortable. Does not follow any commands. LINES:  Trach, central line, coyle,  NG 
 
DRIPS:  TF, precedex CXR:  
02/20/2021 None today 02/15/2021 Ventilator Settings Mode FIO2 Rate Tidal Volume Pressure PEEP Pressure support  30 % 24 430 mls 8 cm H2O  8 cm H20 Peak airway pressure: 17 cm H2O Minute ventilation: 12 l/min LAB Recent Labs  
  02/25/21 
0628 02/24/21 
2349 02/24/21 
1746 02/24/21 
1106 02/23/21 
2355 GLUCPOC 177* 212* 273* 228* 164* Recent Labs  
  02/24/21 
0335 WBC 9.1 HGB 8.2* HCT 26.5*  
 Recent Labs  
  02/24/21 
0335   
K 3.7 CL 98  
CO2 42* * BUN 70* CREA 1.41* CA 9.7 ABG:   
Lab Results Component Value Date/Time PHI 7.45 02/25/2021 03:50 AM  
 PCO2I 58.8 (H) 02/25/2021 03:50 AM  
 PO2I 64 (L) 02/25/2021 03:50 AM  
 HCO3I 41.2 (H) 02/25/2021 03:50 AM  
 FIO2I 30 02/25/2021 03:50 AM  
 
 
 
 
Assessment:  (Medical Decision Making) Hospital Problems  Date Reviewed: 2/5/2021 Codes Class Noted POA Encephalopathy ICD-10-CM: G93.40 ICD-9-CM: 348.30  2/7/2021 Unknown Cardiac arrest [facility]) ICD-10-CM: I46.9 ICD-9-CM: 427.5  12/30/2020 No  
   
 COVID-19 ICD-10-CM: U07.1 ICD-9-CM: 079.89  12/24/2020 Yes  * (Principal) Acute respiratory distress syndrome (ARDS) due to severe acute respiratory syndrome coronavirus 2 (SARS-CoV-2) ([facility]) ICD-10-CM: U07.1, J80 
ICD-9-CM: 518.82, 079.89  12/24/2020 No  
   
 Acute hypoxemic respiratory failure ([facility] [address] ICD-10-CM: J96.01 
ICD-9-CM: 518.81  12/24/2020 Yes Acute on chronic renal failure (HCC) ICD-10-CM: N17.9, N18.9 ICD-9-CM: 584.9, 585.9  9/17/2019 Yes Coronary artery disease involving coronary bypass graft of native heart without angina pectoris (Chronic) ICD-10-CM: W05.562 ICD-9-CM: 414.05  6/15/2015 Yes Uncontrolled type 2 diabetes mellitus with hyperglycemia (HCC) (Chronic) ICD-10-CM: E11.65 ICD-9-CM: 250.02  6/15/2015 Yes Diabetes mellitus with hyperosmolarity without hyperglycemic hyperosmolar nonketotic coma (HCC) (Chronic) ICD-10-CM: E11.00 ICD-9-CM: 250.20  11/24/2014 Yes Prolonged respiratory failure post covid with ongoing need for life support and unchanged encephalopathy. Family rescinded comfort care on 2 occasions. No significant improvement; admitted 12/24. Plan:  (Medical Decision Making) Stopped lasix 80 mg IV, completed diamox today. On fentanyl patch and riperdal 
DNR with dismal prognosis. Tolerating PS 8 for days. Increase CPAP trial duration as able. More than 50% of the time documented was spent in face-to-face contact with the patient and in the care of the patient on the floor/unit where the patient is located. Sergio Reid, CHER Lungs: distant sounds more on RIght chest vs left Heart S1 and S2 audible, no murmers or rubs appreciated Other On PS and did taper to 4/8 from 8/8. Told nurse to inform respiratory. Note, was very tachypneic with CPAP yesterday and had to go back on PS Spoke with daughter and aware prognosis is poor. I have spoken with and examined the patient. I have reviewed the history, examination, assessment, and plan and agree with the above. Miles Hammer MD 
 
 
This note was signed electronically. Errors are unfortunately her likely due to dictation software.

## 2021-02-26 NOTE — PROGRESS NOTES
Ventilator check complete; patient has a #6.0 shiley XLT tracheostomy. Patient is not sedated. Patient is not able to follow commands. Breath sounds are diminished. Trachea is midline, Negative for subcutaneous air, and chest excursion is symmetric. Patient is also Negative for cyanosis. All alarms are set and audible. Resuscitation bag is at the head of the bed. Ventilator Settings Mode FIO2 Rate Tidal Volume Pressure PEEP I:E Ratio Pressure support  30 %      4 cm H2O  8 cm H20  1:1.2 Peak airway pressure: 13 cm H2O Minute ventilation: 8.95 l/min [facility] José, RT

## 2021-02-26 NOTE — PROGRESS NOTES
Critical Care Daily Progress Note: 2/26/2021 Admission Date: 12/24/2020 The patient's chart is reviewed and the patient is discussed with the staff. 79 yo female admitted 12/24 with acute respiratory failure secondary to COVID 19.  Pt was treated with Decadron and convalescent plasma. She had PRATIMA and was not treated with Remdesivir.  She was initially on BiPAP but pt had a  cardiac arrest on 12/30 requiring intubation.  She has remained vent dependent and a trach was placed on 1/19. Pt has been treated for an E coli UTII as well as MRSA in her sputum. He developed acute on chronic renal failure and started on dialysis on 1/12 but now off of since 1/25 and there are no plans to restart. Michaela Gusman consulted for PEG but this on hold pending family discussion regarding need for long term care. Pt was seen by Neurology with an abnormal EEG with moderate to severe slowing. Brain MRI 1/26 revealed stable age-related senescent changes and chronic microvascular disease with remote right posterior temporal occipital ischemic insult. No evidence anoxic brain injury per neuro - deep encephalopathy. Has required IV sedation for prolonged periods due to tachypnea. Risperdal increased and fentanyl patch applied. Subjective: On PS of 4. Now off all drips Current Facility-Administered Medications Medication Dose Route Frequency  potassium bicarb-citric acid (EFFER-K) tablet 40 mEq  40 mEq Oral BID  
 oxyCODONE IR (ROXICODONE) tablet 10 mg  10 mg Per NG tube Q8H PRN  
 insulin glargine (LANTUS) injection 20 Units  20 Units SubCUTAneous DAILY  risperiDONE (RisperDAL) 1 mg/mL oral solution soln 1 mg  1 mg Nasogastric Q12H  
 fentaNYL (DURAGESIC) 50 mcg/hr patch 1 Patch  1 Patch TransDERmal Q72H  loperamide (IMODIUM) 1 mg/7.5 mL oral solution 2 mg  2 mg Per NG tube QID PRN  
 magic mouthwash (ALYX) oral suspension 5 mL  5 mL Oral Q6H  
  pantoprazole (PROTONIX) 40 mg in 0.9% sodium chloride 10 mL injection  40 mg IntraVENous DAILY  Saccharomyces boulardii (FLORASTOR) capsule 250 mg  250 mg Per NG tube BID  LORazepam (ATIVAN) injection 1 mg  1 mg IntraVENous Q6H PRN  
 guaiFENesin (ROBITUSSIN) 100 mg/5 mL oral liquid 100 mg  100 mg Per NG tube Q4H PRN  
 insulin regular (NOVOLIN R, HUMULIN R) injection   SubCUTAneous Q6H  
 albuterol (PROVENTIL VENTOLIN) nebulizer solution 2.5 mg  2.5 mg Nebulization Q6H RT  
 HYDROmorphone (PF) (DILAUDID) injection 2 mg  2 mg IntraVENous Q3H PRN  
 hydrALAZINE (APRESOLINE) 20 mg/mL injection 10 mg  10 mg IntraVENous Q6H PRN  
 docusate (COLACE) 50 mg/5 mL oral liquid 100 mg  100 mg Per NG tube DAILY PRN  
 heparin (porcine) 1,000 unit/mL injection 5,000 Units  5,000 Units IntraVENous DIALYSIS PRN  
 alcohol 62% (NOZIN) nasal  1 Ampule  1 Ampule Topical Q12H  
 [Held by provider] heparin (porcine) injection 5,000 Units  5,000 Units SubCUTAneous Q8H  
 bisacodyL (DULCOLAX) suppository 10 mg  10 mg Rectal DAILY PRN  
 NUTRITIONAL SUPPORT ELECTROLYTE PRN ORDERS   Does Not Apply PRN  
 dextrose 40% (GLUTOSE) oral gel 1 Tube  15 g Oral PRN  
 glucagon (GLUCAGEN) injection 1 mg  1 mg IntraMUSCular PRN  
 dextrose (D50W) injection syrg 12.5-25 g  25-50 mL IntraVENous PRN  
 albuterol (PROVENTIL HFA, VENTOLIN HFA, PROAIR HFA) inhaler 2 Puff  2 Puff Inhalation Q4H PRN  phenol throat spray (CHLORASEPTIC) 1 Spray  1 Spray Oral PRN  
 lip protectant (BLISTEX) ointment 1 Each  1 Each Topical PRN  
 acetaminophen (TYLENOL) tablet 650 mg  650 mg Oral Q6H PRN  
 sodium chloride (NS) flush 5-40 mL  5-40 mL IntraVENous Q8H  
 sodium chloride (NS) flush 5-40 mL  5-40 mL IntraVENous PRN Review of Systems: cannot obtain due to patient condition Objective:  
 
Vitals:  
 02/26/21 0545 02/26/21 0600 02/26/21 0615 02/26/21 0757 BP: (!) 150/71 (!) 178/81 (!) 156/71 Pulse: 93 97 97 88 Resp: 25 24 25 17 Temp:      
SpO2: 100% 100% 100% 100% Weight:      
Height:      
 
 
 
Physical Exam:         
Constitutional:  trached and mechanically ventilated. EENMT:  Sclera clear, pupils equal.  
Respiratory: CTA on PS Cardiovascular:  RRR with no M,G,R; sinus per telemetry Gastrointestinal:  soft with no evidence of tenderness; positive bowel sounds present Musculoskeletal:  warm with no cyanosis, minimal edema Skin:  no jaundice or ecchymosis Neurologic: calm, comfortable. Does not follow any commands. Opens her eyes LINES:  Trach, central line, coyle,  NG 
 
Nutrition:  TF 
 
CXR:  
02/20/2021 None today 02/15/2021 Ventilator Settings Mode FIO2 Rate Tidal Volume Pressure PEEP Pressure support  30 % 24 430 mls 4 cm H2O  8 cm H20 Peak airway pressure: 13 cm H2O Minute ventilation: 8.95 l/min LAB Recent Labs  
  02/26/21 
2938 02/26/21 
0038 02/25/21 
1746 02/25/21 
1137 02/25/21 
7435 GLUCPOC 103* 270* 222* 231* 177* Recent Labs  
  02/26/21 
0347 02/24/21 
0335 WBC 10.8 9.1 HGB 7.8* 8.2* HCT 25.7* 26.5*  
 192 Recent Labs  
  02/26/21 
0347 02/24/21 
0335  140  
K 3.2* 3.7 CL 97* 98  
CO2 40* 42* * 143* BUN 75* 70* CREA 1.31* 1.41* CA 9.5 9.7 ABG:   
Lab Results Component Value Date/Time PHI 7.35 02/26/2021 03:47 AM  
 PCO2I 70.7 (HH) 02/26/2021 03:47 AM  
 PO2I 78 02/26/2021 03:47 AM  
 HCO3I 38.9 (H) 02/26/2021 03:47 AM  
 FIO2I 30 02/26/2021 03:47 AM  
 
 
 
 
Assessment:  (Medical Decision Making) Hospital Problems  Date Reviewed: 2/5/2021 Codes Class Noted POA Encephalopathy ICD-10-CM: G93.40 ICD-9-CM: 348.30  2/7/2021 Unknown Cardiac arrest [facility]) ICD-10-CM: I46.9 ICD-9-CM: 427.5  12/30/2020 No  
   
 COVID-19 ICD-10-CM: U07.1 ICD-9-CM: 079.89  12/24/2020 Yes * (Principal) Acute respiratory distress syndrome (ARDS) due to severe acute respiratory syndrome coronavirus 2 (SARS-CoV-2) ([facility]) ICD-10-CM: U07.1, J80 
ICD-9-CM: 518.82, 079.89  12/24/2020 No  
   
 Acute hypoxemic respiratory failure ([facility] [address] ICD-10-CM: J96.01 
ICD-9-CM: 518.81  12/24/2020 Yes Acute on chronic renal failure ([facility]) ICD-10-CM: N17.9, N18.9 ICD-9-CM: 584.9, 585.9  9/17/2019 Yes Coronary artery disease involving coronary bypass graft of native heart without angina pectoris (Chronic) ICD-10-CM: W73.434 ICD-9-CM: 414.05  6/15/2015 Yes Uncontrolled type 2 diabetes mellitus with hyperglycemia ([facility]) (Chronic) ICD-10-CM: E11.65 ICD-9-CM: 250.02  6/15/2015 Yes Diabetes mellitus with hyperosmolarity without hyperglycemic hyperosmolar nonketotic coma ([facility]) (Chronic) ICD-10-CM: E11.00 ICD-9-CM: 250.20  11/24/2014 Yes Prolonged respiratory failure post covid with ongoing need for life support and unchanged encephalopathy. Family rescinded comfort care on 2 occasions. No significant improvement; admitted 12/24. Plan:  (Medical Decision Making)  
 
lasix 40 mg IV On fentanyl patch and riperdal 
DNR with dismal prognosis. Hgb 7.8, no transfusions per family. On PS 4, CPAP trials as able. More than 50% of the time documented was spent in face-to-face contact with the patient and in the care of the patient on the floor/unit where the patient is located. Bedelia Hoot, NP Lungs : mild course sounds. No wheezing. Heart S1 and S2 audible, no murmers or rubs appreciated MS: mouth is open and not closing but with nursing was able to do so with swab by herself Still sleepy and stop fentanyl drip and change to kymberly prn.  
still on Risperdal and will consider taper in next 48 hours 
cxr on Sunday Getting lasix now. Still overall poor prognosis. I have spoken with and examined the patient. I have reviewed the history, examination, assessment, and plan and agree with the above. Yany Jaffe MD 
 
 
This note was signed electronically. Errors are unfortunately her likely due to dictation software.

## 2021-02-27 NOTE — PROGRESS NOTES
Critical Care Daily Progress Note: 2/27/2021 Admission Date: 12/24/2020 The patient's chart is reviewed and the patient is discussed with the staff. 79 yo female admitted 12/24 with acute respiratory failure secondary to COVID 19.  Pt was treated with Decadron and convalescent plasma. She had PRATIMA and was not treated with Remdesivir.  She was initially on BiPAP but pt had a  cardiac arrest on 12/30 requiring intubation.  She has remained vent dependent and a trach was placed on 1/19. Pt has been treated for an E coli UTII as well as MRSA in her sputum. He developed acute on chronic renal failure and started on dialysis on 1/12 but now off of since 1/25 and there are no plans to restart. Huey Smith consulted for PEG but this on hold pending family discussion regarding need for long term care. Pt was seen by Neurology with an abnormal EEG with moderate to severe slowing. Brain MRI 1/26 revealed stable age-related senescent changes and chronic microvascular disease with remote right posterior temporal occipital ischemic insult. No evidence anoxic brain injury per neuro - deep encephalopathy. Has required IV sedation for prolonged periods due to tachypnea. Risperdal increased and fentanyl patch applied. Subjective:  
 
Patient has been on CPAP last night. Did get slight tachypneic and heart rate elevated. Did get a dose of oxycodone, since we stopped the fentanyl yesterday and settled down again. Today in the room not really alert again again doing the opening mouth with her tongue out as she previously did. But no other issues reported overnight. Current Facility-Administered Medications Medication Dose Route Frequency  furosemide (LASIX) injection 40 mg  40 mg IntraVENous DAILY  oxyCODONE IR (ROXICODONE) tablet 5 mg  5 mg Per NG tube Q8H PRN  
 insulin glargine (LANTUS) injection 20 Units  20 Units SubCUTAneous DAILY  risperiDONE (RisperDAL) 1 mg/mL oral solution soln 1 mg  1 mg Nasogastric Q12H  
 loperamide (IMODIUM) 1 mg/7.5 mL oral solution 2 mg  2 mg Per NG tube QID PRN  
 magic mouthwash (ALYX) oral suspension 5 mL  5 mL Oral Q6H  
 pantoprazole (PROTONIX) 40 mg in 0.9% sodium chloride 10 mL injection  40 mg IntraVENous DAILY  Saccharomyces boulardii (FLORASTOR) capsule 250 mg  250 mg Per NG tube BID  
 guaiFENesin (ROBITUSSIN) 100 mg/5 mL oral liquid 100 mg  100 mg Per NG tube Q4H PRN  
 insulin regular (NOVOLIN R, HUMULIN R) injection   SubCUTAneous Q6H  
 albuterol (PROVENTIL VENTOLIN) nebulizer solution 2.5 mg  2.5 mg Nebulization Q6H RT  
 hydrALAZINE (APRESOLINE) 20 mg/mL injection 10 mg  10 mg IntraVENous Q6H PRN  
 docusate (COLACE) 50 mg/5 mL oral liquid 100 mg  100 mg Per NG tube DAILY PRN  
 heparin (porcine) 1,000 unit/mL injection 5,000 Units  5,000 Units IntraVENous DIALYSIS PRN  
 alcohol 62% (NOZIN) nasal  1 Ampule  1 Ampule Topical Q12H  
 [Held by provider] heparin (porcine) injection 5,000 Units  5,000 Units SubCUTAneous Q8H  
 bisacodyL (DULCOLAX) suppository 10 mg  10 mg Rectal DAILY PRN  
 NUTRITIONAL SUPPORT ELECTROLYTE PRN ORDERS   Does Not Apply PRN  
 dextrose 40% (GLUTOSE) oral gel 1 Tube  15 g Oral PRN  
 glucagon (GLUCAGEN) injection 1 mg  1 mg IntraMUSCular PRN  
 dextrose (D50W) injection syrg 12.5-25 g  25-50 mL IntraVENous PRN  
 albuterol (PROVENTIL HFA, VENTOLIN HFA, PROAIR HFA) inhaler 2 Puff  2 Puff Inhalation Q4H PRN  phenol throat spray (CHLORASEPTIC) 1 Spray  1 Spray Oral PRN  
 lip protectant (BLISTEX) ointment 1 Each  1 Each Topical PRN  
 acetaminophen (TYLENOL) tablet 650 mg  650 mg Oral Q6H PRN  
 sodium chloride (NS) flush 5-40 mL  5-40 mL IntraVENous Q8H  
 sodium chloride (NS) flush 5-40 mL  5-40 mL IntraVENous PRN Review of Systems: cannot obtain due to patient condition Objective:  
 
Vitals: 02/27/21 0630 02/27/21 9895 02/27/21 0715 02/27/21 4331 BP: (!) 111/55 Pulse: 83   81 Resp: 22   25 Temp:   97.7 °F (36.5 °C) SpO2: 97%   99% Weight:  150 lb (68 kg) Height:      
 
 
 
Physical Exam:         
Constitutional:  trached and mechanically ventilated. EENMT:  Sclera clear, pupils equal, keeps tongue out. Respiratory: CTA on PS Cardiovascular:  RRR with no M,G,R; sinus per telemetry Gastrointestinal:  soft with no evidence of tenderness; positive bowel sounds present Musculoskeletal:  warm with no cyanosis, minimal edema Skin:  no jaundice or ecchymosis Neurologic: calm, comfortable. Does not follow any commands. Opens her eyes LINES:  Trach, central line, coyle,  NG 
 
Nutrition:  TF and tolerating CXR:  
02/25/21 less congested vs 2/15. Noted trach Ventilator Settings Mode FIO2 Rate Tidal Volume Pressure PEEP  
CPAP  30 % 24 430 mls 0 cm H2O  8 cm H20 Peak airway pressure: 12 cm H2O Minute ventilation: 9.94 l/min LAB Recent Labs  
  02/27/21 
4646 02/27/21 
8651 02/26/21 
2347 02/26/21 
1828 02/26/21 
8400 GLUCPOC 167* 180* 282* 205* 103* Recent Labs  
  02/26/21 
2816 WBC 10.8 HGB 7.8* HCT 25.7*  
 Recent Labs  
  02/26/21 
2729   
K 3.2*  
CL 97* CO2 40* * BUN 75* CREA 1.31* CA 9.5 ABG:   
Lab Results Component Value Date/Time PHI 7.36 02/27/2021 03:35 AM  
 PCO2I 70.3 ([place]) 02/27/2021 03:35 AM  
 PO2I 86 02/27/2021 03:35 AM  
 HCO3I 39.5 (H) 02/27/2021 03:35 AM  
 FIO2I 30 02/27/2021 03:35 AM  
 
 
 
 
Assessment:  (Medical Decision Making) Hospital Problems  Date Reviewed: 2/5/2021 Codes Class Noted POA Encephalopathy ICD-10-CM: G93.40 ICD-9-CM: 348.30  2/7/2021 Unknown Cardiac arrest [facility] ICD-10-CM: I46.9 ICD-9-CM: 427.5  12/30/2020 No  
   
 COVID-19 ICD-10-CM: U07.1 ICD-9-CM: 079.89  12/24/2020 Yes * (Principal) Acute respiratory distress syndrome (ARDS) due to severe acute respiratory syndrome coronavirus 2 (SARS-CoV-2) ([facility]) ICD-10-CM: U07.1, J80 
ICD-9-CM: 518.82, 079.89  12/24/2020 No  
   
 Acute hypoxemic respiratory failure ([facility] [address] ICD-10-CM: J96.01 
ICD-9-CM: 518.81  12/24/2020 Yes Acute on chronic renal failure (HCC) ICD-10-CM: N17.9, N18.9 ICD-9-CM: 584.9, 585.9  9/17/2019 Yes Coronary artery disease involving coronary bypass graft of native heart without angina pectoris (Chronic) ICD-10-CM: S63.057 ICD-9-CM: 414.05  6/15/2015 Yes Uncontrolled type 2 diabetes mellitus with hyperglycemia (HCC) (Chronic) ICD-10-CM: E11.65 ICD-9-CM: 250.02  6/15/2015 Yes Diabetes mellitus with hyperosmolarity without hyperglycemic hyperosmolar nonketotic coma ([facility]) (Chronic) ICD-10-CM: E11.00 ICD-9-CM: 250.20  11/24/2014 Yes Prolonged respiratory failure post covid with ongoing need for life support and unchanged encephalopathy. Family rescinded comfort care on 2 occasions. No significant improvement; admitted 12/24. Plan is conservative treatment. Plan:  (Medical Decision Making) Tolerating CPAP and tomorrow we will do T-piece trials. --Given Lasix and chest x-ray clearing up. Will get another chest x-ray on Sunday --Stop fentanyl patch requiring Roxicodone as needed. We will start tapering Risperdal today to see if that helps her level of alertness --Hemoglobin still in the sevens and no transfusion planned  
--DNR overall prognosis is poor More than 50% of the time documented was spent in face-to-face contact with the patient and in the care of the patient on the floor/unit where the patient is located. Vivian Rivera MD 
 
 
This note was signed electronically. Errors are unfortunately her likely due to dictation software.

## 2021-02-27 NOTE — PROGRESS NOTES
Ventilator check complete; patient has a #6. .0 tracheostomy. .  Breath sounds are diminished. Trachea is midline, Negative for subcutaneous air, and chest excursion is symmetric. Patient is also Negative for cyanosis and is Negative for pitting edema. All alarms are set and audible. Resuscitation bag is at the head of the bed. Ventilator Settings Mode FIO2 Rate Tidal Volume Pressure PEEP I:E Ratio CPAP  30 %    0.4 ml  0 cm H2O  8 cm H20  1:1.2 Peak airway pressure: 10 cm H2O Minute ventilation: 8.7 l/min ABG: No results for input(s): PH, PCO2, PO2, HCO3 in the last 72 hours.  
 
 
Anson Cockayne, RT

## 2021-02-27 NOTE — PROGRESS NOTES
Ventilator check complete; patient has a #6.0 XLT-Proximal tracheostomy. Patient is not sedated. Patient is not able to follow commands. Breath sounds are coarse and diminished. Trachea is midline, Negative for subcutaneous air, and chest excursion is symmetric. Patient is also Negative for cyanosis and is Negative for pitting edema. All alarms are set and audible. Resuscitation bag is at the head of the bed. Ventilator Settings Mode FIO2 Rate Tidal Volume Pressure Support PEEP I:E Ratio CPAP  30 %      0 cm H2O  8 cm H20 Peak airway pressure: 10 cm H2O Minute ventilation: 12.5 l/min Abril Fritz RT

## 2021-02-28 NOTE — PROGRESS NOTES
Ventilator check complete; patient has a #6.0 XLT-Proximal tracheostomy. Patient is not sedated. Patient is not able to follow commands. Breath sounds are coarse. Trachea is midline, Negative for subcutaneous air, and chest excursion is symmetric. Patient is also Negative for cyanosis and is Negative for pitting edema. All alarms are set and audible. Resuscitation bag is at the head of the bed. Ventilator Settings Mode FIO2 Rate Tidal Volume Pressure Support PEEP I:E Ratio SIMV, VC+  30 % 24  400 ml  8 cm H2O  8 cm H20  1:1.9 Peak airway pressure: 27 cm H2O Minute ventilation: 11.6 l/min Julian Sy, RT

## 2021-02-28 NOTE — PROGRESS NOTES
Critical Care Daily Progress Note: 2/28/2021 Admission Date: 12/24/2020 The patient's chart is reviewed and the patient is discussed with the staff. 77 yo female admitted 12/24 with acute respiratory failure secondary to COVID 19.  Pt was treated with Decadron and convalescent plasma. She had PRATIMA and was not treated with Remdesivir.  She was initially on BiPAP but pt had a  cardiac arrest on 12/30 requiring intubation.  She has remained vent dependent and a trach was placed on 1/19. Pt has been treated for an E coli UTII as well as MRSA in her sputum. He developed acute on chronic renal failure and started on dialysis on 1/12 but now off of since 1/25 and there are no plans to restart. Marie Cochran consulted for PEG but this on hold pending family discussion regarding need for long term care. Pt was seen by Neurology with an abnormal EEG with moderate to severe slowing. Brain MRI 1/26 revealed stable age-related senescent changes and chronic microvascular disease with remote right posterior temporal occipital ischemic insult. No evidence anoxic brain injury per neuro - deep encephalopathy. Has required IV sedation for prolonged periods due to tachypnea. Risperdal increased and fentanyl patch applied. Subjective:  
 
Patient has been on CPAP overnight. Does have some issues sometimes when she has marked tachypnea with respiratory rate in the 30s and 40s and had to give some additional pain medication to help, but it is only oral.  Nurse is wondering if we could change it. No other issues overnight. She is not keeping her mouth open as much as she did the other day intermittently. Current Facility-Administered Medications Medication Dose Route Frequency  furosemide (LASIX) injection 40 mg  40 mg IntraVENous DAILY  oxyCODONE IR (ROXICODONE) tablet 5 mg  5 mg Per NG tube Q8H PRN  
 insulin glargine (LANTUS) injection 20 Units  20 Units SubCUTAneous DAILY  risperiDONE (RisperDAL) 1 mg/mL oral solution soln 1 mg  1 mg Nasogastric Q12H  
 loperamide (IMODIUM) 1 mg/7.5 mL oral solution 2 mg  2 mg Per NG tube QID PRN  
 magic mouthwash (ALYX) oral suspension 5 mL  5 mL Oral Q6H  
 pantoprazole (PROTONIX) 40 mg in 0.9% sodium chloride 10 mL injection  40 mg IntraVENous DAILY  Saccharomyces boulardii (FLORASTOR) capsule 250 mg  250 mg Per NG tube BID  
 guaiFENesin (ROBITUSSIN) 100 mg/5 mL oral liquid 100 mg  100 mg Per NG tube Q4H PRN  
 insulin regular (NOVOLIN R, HUMULIN R) injection   SubCUTAneous Q6H  
 albuterol (PROVENTIL VENTOLIN) nebulizer solution 2.5 mg  2.5 mg Nebulization Q6H RT  
 hydrALAZINE (APRESOLINE) 20 mg/mL injection 10 mg  10 mg IntraVENous Q6H PRN  
 docusate (COLACE) 50 mg/5 mL oral liquid 100 mg  100 mg Per NG tube DAILY PRN  
 heparin (porcine) 1,000 unit/mL injection 5,000 Units  5,000 Units IntraVENous DIALYSIS PRN  
 alcohol 62% (NOZIN) nasal  1 Ampule  1 Ampule Topical Q12H  
 [Held by provider] heparin (porcine) injection 5,000 Units  5,000 Units SubCUTAneous Q8H  
 bisacodyL (DULCOLAX) suppository 10 mg  10 mg Rectal DAILY PRN  
 NUTRITIONAL SUPPORT ELECTROLYTE PRN ORDERS   Does Not Apply PRN  
 dextrose 40% (GLUTOSE) oral gel 1 Tube  15 g Oral PRN  
 glucagon (GLUCAGEN) injection 1 mg  1 mg IntraMUSCular PRN  
 dextrose (D50W) injection syrg 12.5-25 g  25-50 mL IntraVENous PRN  
 albuterol (PROVENTIL HFA, VENTOLIN HFA, PROAIR HFA) inhaler 2 Puff  2 Puff Inhalation Q4H PRN  phenol throat spray (CHLORASEPTIC) 1 Spray  1 Spray Oral PRN  
 lip protectant (BLISTEX) ointment 1 Each  1 Each Topical PRN  
 acetaminophen (TYLENOL) tablet 650 mg  650 mg Oral Q6H PRN  
 sodium chloride (NS) flush 5-40 mL  5-40 mL IntraVENous Q8H  
 sodium chloride (NS) flush 5-40 mL  5-40 mL IntraVENous PRN Review of Systems: cannot obtain due to patient condition Objective:  
 
Vitals: 02/28/21 0730 02/28/21 0745 02/28/21 0800 02/28/21 5477 BP:   (!) 157/70 Pulse: 97 99 93 93 Resp: (!) 39 (!) 35 (!) 37 (!) 33 Temp:   97.7 °F (36.5 °C) SpO2: 99% 99% 100% 98% Weight:      
Height:      
 
 
 
Physical Exam:         
Constitutional:  trached and mechanically ventilated. EENMT:  Sclera clear, pupils equal, keeps tongue out. Respiratory: CTA on PS Cardiovascular:  RRR with no M,G,R; sinus per telemetry Gastrointestinal:  soft with no evidence of tenderness; positive bowel sounds present Musculoskeletal:  warm with no cyanosis, minimal edema Skin:  no jaundice or ecchymosis Neurologic: calm, comfortable. Does not follow any commands. Opens her eyes LINES:  Trach, central line, coyle,  NG 
 
Nutrition:  TF and tolerating CXR:  
2/28/21 mild congestion compared to prior study 02/25/21 less congested vs 2/15. Noted trach Ventilator Settings Mode FIO2 Rate Tidal Volume Pressure PEEP  
CPAP  30 % 24 430 mls 0 cm H2O  8 cm H20 Peak airway pressure: 10 cm H2O Minute ventilation: 11.7 l/min LAB Recent Labs  
  02/28/21 
0530 02/28/21 
0040 02/27/21 
1834 02/27/21 
1254 02/27/21 
7821 GLUCPOC 201* 214* 180* 226* 167* Recent Labs  
  02/26/21 
4190 WBC 10.8 HGB 7.8* HCT 25.7*  
 Recent Labs  
  02/26/21 
8263   
K 3.2*  
CL 97* CO2 40* * BUN 75* CREA 1.31* CA 9.5 ABG:   
Lab Results Component Value Date/Time PHI 7.41 02/28/2021 03:52 AM  
 PCO2I 59.9 (H) 02/28/2021 03:52 AM  
 PO2I 76 02/28/2021 03:52 AM  
 HCO3I 37.9 (H) 02/28/2021 03:52 AM  
 FIO2I 30 02/28/2021 03:52 AM  
 
 
 
 
Assessment:  (Medical Decision Making) Hospital Problems  Date Reviewed: 2/5/2021 Codes Class Noted POA Encephalopathy ICD-10-CM: G93.40 ICD-9-CM: 348.30  2/7/2021 Unknown Cardiac arrest [facility]) ICD-10-CM: I46.9 ICD-9-CM: 427.5  12/30/2020 No  
   
 COVID-19 ICD-10-CM: U07.1 ICD-9-CM: 079.89  12/24/2020 Yes * (Principal) Acute respiratory distress syndrome (ARDS) due to severe acute respiratory syndrome coronavirus 2 (SARS-CoV-2) ([facility]) ICD-10-CM: U07.1, J80 
ICD-9-CM: 518.82, 079.89  12/24/2020 No  
   
 Acute hypoxemic respiratory failure ([facility] [address] ICD-10-CM: J96.01 
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 Family rescinded comfort care on 2 occasions. No significant improvement; admitted 12/24. Plan is conservative treatment. Plan:  (Medical Decision Making) Tolerating CPAP . will do some T-piece trials --We will add some morphine for these intermittent tachypneic episodes. 
-Chest x-ray with slight increase in congestion and can give some Lasix- 
--we will start tapering Risperdal today see if that helps her level of alertness --And get baseline labs Monday. Please note I do not really want to transfuses her, as does her daughter. Alona Javier --DNR overall prognosis is poor Initially did not want to go to [facility], since they wanted aggressive care, but now has been here for a while. May eventually need to go since not unstable at this time. Reassess her on Monday. More than 50% of the time documented was spent in face-to-face contact with the patient and in the care of the patient on the floor/unit where the patient is located. Edward Arora MD 
 
 
This note was signed electronically. Errors are unfortunately her likely due to dictation software.

## 2021-03-01 NOTE — PROGRESS NOTES
Care Management Interventions PCP Verified by CM: Vidal Sandoval MD) Mode of Transport at Discharge: Other (see comment)(To [facility]) Transition of Care Consult (CM Consult): Discharge Planning Discharge Durable Medical Equipment: No 
Physical Therapy Consult: No 
Occupational Therapy Consult: No 
Speech Therapy Consult: Yes Current Support Network: Lives with Caregiver Confirm Follow Up Transport: Family The Plan for Transition of Care is Related to the Following Treatment Goals : LTAC The Patient and/or Patient Representative was Provided with a Choice of Provider and Agrees with the Discharge Plan?: Yes Name of the Patient Representative Who was Provided with a Choice of Provider and Agrees with the Discharge Plan: Daughters, Phil and Jhonathan Cruz Foss of Choice List was Provided with Basic Dialogue that Supports the Patient's Individualized Plan of Care/Goals, Treatment Preferences and Shares the Quality Data Associated with the Providers?: Yes [employer] Information Provided?: No 
Discharge Location Discharge Placement: Unable to determine at this time CM met with pt and her daughter, Phil, at bedside. Pt's head is lifted off the pillow and her eyes follow this CM across the room. Mitts are off while her daughter is in the room. Pt has been tolerating trach collar during the day with rest on CPAP/PS at night. Plan for GI consult to place PEG. PTA pt had been cooking in her apartment  Pt has a CLTC 4 days/wk for 4 hrs/day according to Phil. CM to continue to follow pt's progress and plan DCP accordingly.

## 2021-03-01 NOTE — PROGRESS NOTES
Patient intermittently following commands however not consistent, given prn oxy for increased agitation, disconnected self from tach- mitts placed on patient, continues on spontaneous

## 2021-03-01 NOTE — PROGRESS NOTES
Ventilator check complete; patient has a #6.0 XLT-Proximal tracheostomy. Patient is not sedated. Patient is not able to follow commands. Breath sounds are coarse and diminished. Trachea is midline, Negative for subcutaneous air, and chest excursion is symmetric. Patient is also Negative for cyanosis and is Negative for pitting edema. All alarms are set and audible. Resuscitation bag is at the head of the bed. Ventilator Settings Mode FIO2 Rate Tidal Volume Pressure PEEP I:E Ratio CPAP  30 %        8 cm H20 Peak airway pressure: 11 cm H2O Minute ventilation: 9.95 l/min Marv Chaparro, RT

## 2021-03-01 NOTE — PROGRESS NOTES
[facility] Reconsult Note Admit Date:  12/24/2020 Today's Date:  3/1/2021 CC:  Difficulty swallowing; PEG eval 
 
Subjective:  
79 yo female with h/o DM admitted 12/24 with acute respiratory failure secondary to COVID 19 and seen in consultation at the request of Dr. Berta Soriano for difficulty swallowing and evaluation for PEG tube placement. She was initially treated with Decadron and convalescent plasma but was not a candidate for Remdesivir secondary to PRATIMA.  She was initially on BiPAP but had cardiac arrest on 12/30 requiring intubation.  She required prolonged vent support so a trach was placed on 1/19. Pt has been treated for an E coli UTII as well as MRSA in her sputum. She developed acute on chronic renal failure and started on dialysis on 1/12 but now off of since 1/25 and there are no plans to restart.  Pt was seen by Neurology with an abnormal EEG with moderate to severe slowing. Brain MRI 1/26 revealed stable age-related senescent changes and chronic microvascular disease with remote right posterior temporal occipital ischemic insult. No evidence anoxic brain injury per neuro - deep encephalopathy. Have gradually been able to reduce sedatives. Now tolerating T collar trials (was able to tolerate for almost 6 hours yesterday) and felt to be able to follow some commands. She was seen by Dr. Terris Canavan with our group earlier this admission on 1/25/21 for eval for PEG, but at that time, long term prognosis was uncertain and the patient's daughter decided to hold off. The daughter is at the bedside and now would like to have the PEG tube placed since the patient has improved some and apparently the eventual plan is to go to [place]. The patient is awake and seems to understand some things--when I ask if she is in pain, she shakes her head no, and when I leave, her daughter tells her to tell me \"bye\" and she lifts up her arm and seemingly tries to wave. Tolerating NGT. Medications:  
Current Facility-Administered Medications Medication Dose Route Frequency  risperiDONE (RisperDAL) 1 mg/mL oral solution soln 0.5 mg  0.5 mg Nasogastric QHS  furosemide (LASIX) injection 40 mg  40 mg IntraVENous ONCE  
 morphine injection 2 mg  2 mg IntraVENous Q6H PRN  
 furosemide (LASIX) injection 40 mg  40 mg IntraVENous DAILY  oxyCODONE IR (ROXICODONE) tablet 5 mg  5 mg Per NG tube Q8H PRN  
 insulin glargine (LANTUS) injection 20 Units  20 Units SubCUTAneous DAILY  loperamide (IMODIUM) 1 mg/7.5 mL oral solution 2 mg  2 mg Per NG tube QID PRN  pantoprazole (PROTONIX) 40 mg in 0.9% sodium chloride 10 mL injection  40 mg IntraVENous DAILY  Saccharomyces boulardii (FLORASTOR) capsule 250 mg  250 mg Per NG tube BID  
 guaiFENesin (ROBITUSSIN) 100 mg/5 mL oral liquid 100 mg  100 mg Per NG tube Q4H PRN  
 insulin regular (NOVOLIN R, HUMULIN R) injection   SubCUTAneous Q6H  
 albuterol (PROVENTIL VENTOLIN) nebulizer solution 2.5 mg  2.5 mg Nebulization Q6H RT  
 hydrALAZINE (APRESOLINE) 20 mg/mL injection 10 mg  10 mg IntraVENous Q6H PRN  
 docusate (COLACE) 50 mg/5 mL oral liquid 100 mg  100 mg Per NG tube DAILY PRN  
 heparin (porcine) 1,000 unit/mL injection 5,000 Units  5,000 Units IntraVENous DIALYSIS PRN  
 alcohol 62% (NOZIN) nasal  1 Ampule  1 Ampule Topical Q12H  
 [Held by provider] heparin (porcine) injection 5,000 Units  5,000 Units SubCUTAneous Q8H  
 bisacodyL (DULCOLAX) suppository 10 mg  10 mg Rectal DAILY PRN  
 NUTRITIONAL SUPPORT ELECTROLYTE PRN ORDERS   Does Not Apply PRN  
 dextrose 40% (GLUTOSE) oral gel 1 Tube  15 g Oral PRN  
 glucagon (GLUCAGEN) injection 1 mg  1 mg IntraMUSCular PRN  
 dextrose (D50W) injection syrg 12.5-25 g  25-50 mL IntraVENous PRN  
 albuterol (PROVENTIL HFA, VENTOLIN HFA, PROAIR HFA) inhaler 2 Puff  2 Puff Inhalation Q4H PRN  phenol throat spray (CHLORASEPTIC) 1 Spray  1 Spray Oral PRN  
  lip protectant (BLISTEX) ointment 1 Each  1 Each Topical PRN  
 acetaminophen (TYLENOL) tablet 650 mg  650 mg Oral Q6H PRN  
 sodium chloride (NS) flush 5-40 mL  5-40 mL IntraVENous Q8H  
 sodium chloride (NS) flush 5-40 mL  5-40 mL IntraVENous PRN Review of Systems: 
Patient denies chest pain. Diet:  TFs Objective:  
Vitals: 
Visit Vitals BP (!) 104/55 Pulse 77 Temp (!) 96.1 °F (35.6 °C) Resp 26 Ht 5' 5\" (1.651 m) Wt 67.6 kg (149 lb) SpO2 99% BMI 24.79 kg/m² Intake/Output: 
No intake/output data recorded. 02/27 1901 - 03/01 0700 In: 2990 Out: 4896 [Urine:2685] Exam: 
General appearance: awake, seems to respond somewhat but nonconversant, no distress Lungs: coarse bilaterally Heart: regular rate and rhythm Abdomen: soft, non-tender. Bowel sounds normal. No masses, no organomegaly Extremities: extremities normal, atraumatic, no cyanosis or edema Data Review (Labs):   
Recent Labs 03/01/21 
0401 WBC 11.5* HGB 8.2* HCT 26.8*  
 MCV 97.1   
K 3.5 CL 99  
CO2 38* BUN 75* CREA 1.21* CA 9.8 MG 2.3 * CXR 2/28/21: 
Mildly progressed pulmonary vascular congestion and interstitial 
lung edema. Assessment:  
 
Principal Problem: 
  Acute respiratory distress syndrome (ARDS) due to severe acute respiratory syndrome coronavirus 2 (SARS-CoV-2) ([facility] (12/24/2020) Active Problems: 
  Diabetes mellitus with hyperosmolarity without hyperglycemic hyperosmolar nonketotic coma ([facility] (11/24/2014) Coronary artery disease involving coronary bypass graft of native heart without angina pectoris (6/15/2015) Uncontrolled type 2 diabetes mellitus with hyperglycemia ([facility] (6/15/2015) Acute on chronic renal failure ([facility] (9/17/2019) COVID-19 (12/24/2020) Acute hypoxemic respiratory failure ([facility] (12/24/2020) Cardiac arrest ([facility] (12/30/2020) Encephalopathy (2/7/2021) 77 yo female with h/o DM admitted 12/24 with acute respiratory failure secondary to COVID 19, complicated by acute on chronic renal failure requiring short term dialysis, cardiac arrest on 12/30 requiring intubation, subsequently with trach placed 1/19/21, E. Coli UTI, MRSA in her sputum, AMS with EEG showing moderate to severe slowing, brain MRI 1/26 revealed stable age-related senescent changes and chronic microvascular disease with remote right posterior temporal occipital ischemic insult and seen by GI for evaluation for PEG tube placement. She was seen by Dr. Linda Kelley with our group earlier this admission on 1/25/21 for eval for PEG, but at that time, long term prognosis was uncertain and the patient's daughter decided to hold off. Now, patient has improved some-- tolerating T collar trials (was able to tolerate for almost 6 hours yesterday) and felt to be able to follow some commands--and daughter would like to proceed with PEG tube placement. Plan:  
1) tentatively scheduled for PEG Tube placement tomorrow (pending evaluation by Dr. Shelia Platt). Procedure and risks including bleeding, perforation, reaction to sedation, infection discussed with patient's daughter who is at bedside. She understands and wishes to proceed. SLIME Oakes Patient seen and examined. Agree with above. Scar right upper abdomen - likely from prior cholecystectomy. Will proceed with PEG tomorrow if all in agreement. Josiah Platt MD

## 2021-03-01 NOTE — PROGRESS NOTES
Care Daily Progress Note: 3/1/2021 Admission Date: 12/24/2020 The patient's chart is reviewed and the patient is discussed with the staff. 77 yo female admitted 12/24 with acute respiratory failure secondary to COVID 19.  Pt was treated with Decadron and convalescent plasma. She had PRATIMA and was not treated with Remdesivir.  She was initially on BiPAP but pt had a  cardiac arrest on 12/30 requiring intubation.  She required prolonged vent support so a trach was placed on 1/19. Pt has been treated for an E coli UTII as well as MRSA in her sputum. She developed acute on chronic renal failure and started on dialysis on 1/12 but now off of since 1/25 and there are no plans to restart. Everardo Calix consulted for PEG but this on hold pending family discussion regarding need for long term care. Pt was seen by Neurology with an abnormal EEG with moderate to severe slowing. Brain MRI 1/26 revealed stable age-related senescent changes and chronic microvascular disease with remote right posterior temporal occipital ischemic insult. No evidence anoxic brain injury per neuro - deep encephalopathy. Have gradually been able to reduce sedatives. Now tolerating T collar trials and felt to be able to follow some commands. Subjective:  
 Opens eyes when stimulated. ? Focuses. Tries to stick out tongue when requested. Not able to follow any other commands. Wearing mitts on hands. Got oxycodone overnight due to tachypnea. Off vent yesterday for almost 6 hours. Current Facility-Administered Medications Medication Dose Route Frequency  risperiDONE (RisperDAL) 1 mg/mL oral solution soln 0.5 mg  0.5 mg Nasogastric QHS  morphine injection 2 mg  2 mg IntraVENous Q6H PRN  
 furosemide (LASIX) injection 40 mg  40 mg IntraVENous DAILY  oxyCODONE IR (ROXICODONE) tablet 5 mg  5 mg Per NG tube Q8H PRN  
 insulin glargine (LANTUS) injection 20 Units  20 Units SubCUTAneous DAILY  loperamide (IMODIUM) 1 mg/7.5 mL oral solution 2 mg  2 mg Per NG tube QID PRN  pantoprazole (PROTONIX) 40 mg in 0.9% sodium chloride 10 mL injection  40 mg IntraVENous DAILY  Saccharomyces boulardii (FLORASTOR) capsule 250 mg  250 mg Per NG tube BID  
 guaiFENesin (ROBITUSSIN) 100 mg/5 mL oral liquid 100 mg  100 mg Per NG tube Q4H PRN  
 insulin regular (NOVOLIN R, HUMULIN R) injection   SubCUTAneous Q6H  
 albuterol (PROVENTIL VENTOLIN) nebulizer solution 2.5 mg  2.5 mg Nebulization Q6H RT  
 hydrALAZINE (APRESOLINE) 20 mg/mL injection 10 mg  10 mg IntraVENous Q6H PRN  
 docusate (COLACE) 50 mg/5 mL oral liquid 100 mg  100 mg Per NG tube DAILY PRN  
 heparin (porcine) 1,000 unit/mL injection 5,000 Units  5,000 Units IntraVENous DIALYSIS PRN  
 alcohol 62% (NOZIN) nasal  1 Ampule  1 Ampule Topical Q12H  
 [Held by provider] heparin (porcine) injection 5,000 Units  5,000 Units SubCUTAneous Q8H  
 bisacodyL (DULCOLAX) suppository 10 mg  10 mg Rectal DAILY PRN  
 NUTRITIONAL SUPPORT ELECTROLYTE PRN ORDERS   Does Not Apply PRN  
 dextrose 40% (GLUTOSE) oral gel 1 Tube  15 g Oral PRN  
 glucagon (GLUCAGEN) injection 1 mg  1 mg IntraMUSCular PRN  
 dextrose (D50W) injection syrg 12.5-25 g  25-50 mL IntraVENous PRN  
 albuterol (PROVENTIL HFA, VENTOLIN HFA, PROAIR HFA) inhaler 2 Puff  2 Puff Inhalation Q4H PRN  phenol throat spray (CHLORASEPTIC) 1 Spray  1 Spray Oral PRN  
 lip protectant (BLISTEX) ointment 1 Each  1 Each Topical PRN  
 acetaminophen (TYLENOL) tablet 650 mg  650 mg Oral Q6H PRN  
 sodium chloride (NS) flush 5-40 mL  5-40 mL IntraVENous Q8H  
 sodium chloride (NS) flush 5-40 mL  5-40 mL IntraVENous PRN Objective:  
 
Vitals:  
 03/01/21 0600 03/01/21 0659 03/01/21 0759 03/01/21 1120 BP: (!) 149/75 (!) 92/53 103/64 Pulse: 85 74 82 80 Resp: 26 25 30 25 Temp: 97.3 °F (36.3 °C)  (!) 96.1 °F (35.6 °C) SpO2: 99% 96% 98% 99% Weight: 149 lb (67.6 kg) Height:      
 
 
Intake and Output:  
02/27 1901 - 03/01 0700 In: 2990 Out: 0102 [Urine:2685] No intake/output data recorded. Physical Exam:         
Constitutional:  intubated and mechanically ventilated. HEENT:  Sclera clear, pupils equal, oral mucosa moist 
RESPIRATORY: coarse bilaterally. Currently on 40% t piece. Some rhonchi with cough CARDIOVASCULAR:  RRR with no M,G,R; sinus per telemetry ABDOMEN/GI:  soft and with no tenderness; positive bowel sounds present EXTREMITIES:  warm with no cyanosis, no lower leg edema SKIN:  no jaundice or ecchymosis NEURO: opens eyes with stimulation. Tries to stick out tongue per command but can't quite do. Cannot answer questions MUSCULOSKELETAL: moves all four extremities. No deformities ROS: unable to obtain LINES:  NG, coyle. Trach, quad lumen DRIPS:  TF 
 
CHEST XRAY:  None today LAB Recent Labs 03/01/21 
0401 WBC 11.5* HGB 8.2* HCT 26.8*  
 Recent Labs 03/01/21 
0401   
K 3.5 CL 99  
CO2 38* * BUN 75* CREA 1.21* MG 2.3 PHOS 3.3 CA 9.8 Assessment:  (Medical Decision Making) Hospital Problems  Date Reviewed: 2/5/2021 Codes Class Noted POA Encephalopathy ICD-10-CM: G93.40 ICD-9-CM: 348.30  2/7/2021 Unknown Improving. Nurse reports that she can follow some commands. Reducing sedation Cardiac arrest [facility]) ICD-10-CM: I46.9 ICD-9-CM: 427.5  12/30/2020 No  
   
 COVID-19 ICD-10-CM: U07.1 ICD-9-CM: 079.89  12/24/2020 Yes Now off isolation * (Principal) Acute respiratory distress syndrome (ARDS) due to severe acute respiratory syndrome coronavirus 2 (SARS-CoV-2) ([facility]) ICD-10-CM: U07.1, J80 
ICD-9-CM: 518.82, 079.89  12/24/2020 No  
 Tolerated t collar for almost 6 hours yesterday Acute hypoxemic respiratory failure ([address] ICD-10-CM: J96.01 
ICD-9-CM: 518.81  12/24/2020 Yes As above Acute on chronic renal failure (HCC) ICD-10-CM: N17.9, N18.9 ICD-9-CM: 584.9, 585.9  9/17/2019 Yes  
 resolved Coronary artery disease involving coronary bypass graft of native heart without angina pectoris (Chronic) ICD-10-CM: I70.592 ICD-9-CM: 414.05  6/15/2015 Yes  
 noted Uncontrolled type 2 diabetes mellitus with hyperglycemia (HCC) (Chronic) ICD-10-CM: E11.65 ICD-9-CM: 250.02  6/15/2015 Yes BS upper 100s Diabetes mellitus with hyperosmolarity without hyperglycemic hyperosmolar nonketotic coma (HCC) (Chronic) ICD-10-CM: E11.00 ICD-9-CM: 250.20  11/24/2014 Yes As above Plan:  (Medical Decision Making) 1. Will reduce Risperdal to 0.5 mg every HS for 3 days and then stop. Using Oxycodone prn for dyspnea 2. Will add on BNP for today - may need to increase lasix. Labs reviewed. Blood pressure stable. Fluid balance + despite 40 mg daily dose at present. Leave coyle for now 3. Extend t collar trials as able. Was able to tolerate for almost 6 hours yesterday. Rest on CPAP or PS at night for now 4. Need to proceed with PEG now that she is doing better. 5. Change labs to every Monday and Thursday 6. DNR 
7. Try to find peripheral IV and remove central line if able 8. IV protonix and no anticoagulation with recent anemia with transfusion - concern for GI bleeding Terry Dukes NP More than 50% of time documented was spent face-to-face contact with the patient and in the care of the patient on the floor/unit where the patient is located. I have spoken with and examined the patient. I agree with the above assessment and plan as documented. Gen:  Appears awake but does not respond meaningfully Lungs:  Clear Heart:  RRR with no Murmur/Rubs/Gallops Abd: NTND Ext: no edema 
 
--agree with de-escalating labs 
--continue vent weaning 
--continue supportive care otherwise. --discuss with GI the possibility of PEG 
--DNR Rebeca Khan MD

## 2021-03-01 NOTE — PROGRESS NOTES
Ventilator check complete; patient has a #6.0 XLT Proximal tracheostomy. Patient is not sedated. Patient is not able to follow commands. Breath sounds are coarse. Trachea is midline, Negative for subcutaneous air, and chest excursion is symmetric. Patient is also Negative for cyanosis and is Negative for pitting edema. All alarms are set and audible. Resuscitation bag is at the head of the bed. Ventilator Settings Mode FIO2 Rate Tidal Volume Pressure PEEP I:E Ratio CPAP  30 %    400 ml  0 cm H2O  8 cm H20  1:1.9 Peak airway pressure: 10 cm H2O Minute ventilation: 11.6 l/min ABG: No results for input(s): PH, PCO2, PO2, HCO3 in the last 72 hours. Luciano Mccormick

## 2021-03-02 NOTE — PROCEDURES
Operative Note Patient: Stephan Birmingham YOB: 1940 MRN: [id number] Date of Procedure: 3/2/2021 Pre-Op Diagnosis: Feeding difficulties [R63.3] Post-Op Diagnosis: Normal EGD with mild gastritis, PEG placed Procedure(s): PERCUTANEOUS ENDOSCOPIC GASTROSTOMY TUBE INSERTION/ 25/ 329 ESOPHAGOGASTRODUODENOSCOPY (EGD) Surgeon(s): 
MD Nixon Coker MD 
 
Surgical Assistant: None Anesthesia: MAC Estimated Blood Loss (mL): Minimal 
 
Complications: None Specimens: * No specimens in log * Implants: 20 Fr Ponsky pull type PEG 
 
 PROCEDURE NOTE:  After informed consent obtained, the patient was placed in the supine position in her Room 329. Conscious sedation was obtained utilizing monitored anesthesia. Please see the anesthesia flow sheet for details. Once adequate analgesia was obtained, the Olympus GIF-H190 video chip endoscope was passed through the oropharynx into the esophagus, stomach and small bowel under direct visualization. The scope was advanced through the periampullary duodenum and was withdrawn with careful attention to mucosal detail. The entire examined duodenum, pylorus, prepyloric antrum, body, cardia and fundus of the stomach are normal. Subsequently, attention was given to the anterior abdominal wall where the endoscope was directed and transillumination is noted in the left upper quadrant with adequate 1:1 ballottement from the external surfaces. Subsequently, the patient was draped and prepped in a sterile manner. Cutaneous and subcutaneous tissues are anesthetized utilizing a 1% lidocaine solution. Subsequently, a finder needle is entered into the gastric lumen followed by the introducer needle and subsequently the guidewire which is snared endoscopically and withdrawn per orally. Subsequently, a 21 [place] [employer] Ponsky style pull type PEG tube is pulled through the oropharynx and through the abdominal wall with minimal blood loss. The location of the internal bolster is verified endoscopically. The external bolster is located at 3 cm from the gastric lumen. The patient tolerated the procedure well. There were no specimens. Blood loss is less than 5 mL. Endoscopic evaluation was performed by Dr Rogelio Hobson . Cutaneous procedure was performed by Dr. Qasim Conner. Findings: OROPHARYNX:  Cords and Pyriform recesses appeared normal. 
ESOPHAGUS:  The proximal, mid and distal esophagus appeared normal. The Z line was intact without evidence of esophagitis, ulcerations, stricture or Ulrich's. STOMACH:  The fundus on antegrade and retroflexed views was normal. The body and antrum showed mild focal erythema consistent with gastritis without evidence of erosions or ulcers. The pylorus also appeared normal. 
DUODENUM:  The bulb and second portions appeared normal. 
 
Recommendations:  
Routine Peg care. May use tube for medications and flushes. To resume tube feedings tomorrow if no dislodgement or evident complications. Use restraints and binders as needed to avoid dislodgement.   
 
Electronically Signed by Carlito Kumar MD on 3/2/2021 at 9:01 AM

## 2021-03-02 NOTE — INTERVAL H&P NOTE
Update History & Physical 
 
The Patient's History and Physical of January 25, 2021 was reviewed with the patient and I examined the patient. There was Changes as noted in update on 3/1/21. The surgical site was confirmed by staff and me. Plan:  The risk, benefits, expected outcome, and alternative to the recommended procedure have been discussed with the patient's POA who understands and wants to proceed with the procedure.  
 
Electronically signed by Ruby Garcia MD on 3/2/2021 at 8:37 AM

## 2021-03-02 NOTE — PROGRESS NOTES
Care Daily Progress Note: 3/2/2021 Admission Date: 12/24/2020 The patient's chart is reviewed and the patient is discussed with the staff. 77 yo female admitted 12/24 with acute respiratory failure secondary to COVID 19.  Pt was treated with Decadron and convalescent plasma. She had PRATIMA and was not treated with Remdesivir.  She was initially on BiPAP but pt had a  cardiac arrest on 12/30 requiring intubation.  She required prolonged vent support so a trach was placed on 1/19. Pt has been treated for an E coli UTII as well as MRSA in her sputum. She developed acute on chronic renal failure and started on dialysis on 1/12 but now off of since 1/25 and there are no plans to restart. Kaylie Tom consulted for PEG but this on hold pending family discussion regarding need for long term care. Pt was seen by Neurology with an abnormal EEG with moderate to severe slowing. Brain MRI 1/26 revealed stable age-related senescent changes and chronic microvascular disease with remote right posterior temporal occipital ischemic insult. No evidence anoxic brain injury per neuro - deep encephalopathy. Have gradually been able to reduce sedatives. Now tolerating T collar trials and felt to be able to follow some commands. PEG placed 3/2. Now able to follow commands and getting limited prn sedation. Subjective:  
   Got PEG earlier this AM.  Was able to hold up fingers for nurse prior to procedure. Her eyes are open and she focuses and tries to follow commands but can't quite do it at present. Current Facility-Administered Medications Medication Dose Route Frequency  risperiDONE (RisperDAL) 1 mg/mL oral solution soln 0.5 mg  0.5 mg Nasogastric QHS  morphine injection 2 mg  2 mg IntraVENous Q6H PRN  
 furosemide (LASIX) injection 40 mg  40 mg IntraVENous DAILY  oxyCODONE IR (ROXICODONE) tablet 5 mg  5 mg Per NG tube Q8H PRN  
  insulin glargine (LANTUS) injection 20 Units  20 Units SubCUTAneous DAILY  loperamide (IMODIUM) 1 mg/7.5 mL oral solution 2 mg  2 mg Per NG tube QID PRN  pantoprazole (PROTONIX) 40 mg in 0.9% sodium chloride 10 mL injection  40 mg IntraVENous DAILY  Saccharomyces boulardii (FLORASTOR) capsule 250 mg  250 mg Per NG tube BID  
 guaiFENesin (ROBITUSSIN) 100 mg/5 mL oral liquid 100 mg  100 mg Per NG tube Q4H PRN  
 insulin regular (NOVOLIN R, HUMULIN R) injection   SubCUTAneous Q6H  
 albuterol (PROVENTIL VENTOLIN) nebulizer solution 2.5 mg  2.5 mg Nebulization Q6H RT  
 hydrALAZINE (APRESOLINE) 20 mg/mL injection 10 mg  10 mg IntraVENous Q6H PRN  
 docusate (COLACE) 50 mg/5 mL oral liquid 100 mg  100 mg Per NG tube DAILY PRN  
 heparin (porcine) 1,000 unit/mL injection 5,000 Units  5,000 Units IntraVENous DIALYSIS PRN  
 alcohol 62% (NOZIN) nasal  1 Ampule  1 Ampule Topical Q12H  
 [Held by provider] heparin (porcine) injection 5,000 Units  5,000 Units SubCUTAneous Q8H  
 bisacodyL (DULCOLAX) suppository 10 mg  10 mg Rectal DAILY PRN  
 NUTRITIONAL SUPPORT ELECTROLYTE PRN ORDERS   Does Not Apply PRN  
 dextrose 40% (GLUTOSE) oral gel 1 Tube  15 g Oral PRN  
 glucagon (GLUCAGEN) injection 1 mg  1 mg IntraMUSCular PRN  
 dextrose (D50W) injection syrg 12.5-25 g  25-50 mL IntraVENous PRN  
 albuterol (PROVENTIL HFA, VENTOLIN HFA, PROAIR HFA) inhaler 2 Puff  2 Puff Inhalation Q4H PRN  phenol throat spray (CHLORASEPTIC) 1 Spray  1 Spray Oral PRN  
 lip protectant (BLISTEX) ointment 1 Each  1 Each Topical PRN  
 acetaminophen (TYLENOL) tablet 650 mg  650 mg Oral Q6H PRN  
 sodium chloride (NS) flush 5-40 mL  5-40 mL IntraVENous Q8H  
 sodium chloride (NS) flush 5-40 mL  5-40 mL IntraVENous PRN Objective:  
 
Vitals:  
 03/02/21 9085 03/02/21 5767 03/02/21 0815 03/02/21 3386 BP:  119/61 119/61 (!) 99/55 Pulse:  80 91 80 Resp:  19 22 16  
 Temp:  (!) 96.4 °F (35.8 °C)  98 °F (36.7 °C) SpO2: 100% 96% 100% 100% Weight:      
Height:      
 
 
Intake and Output:  
02/28 1901 - 03/02 0700 In: 1034 Out: 2710 [Urine:2710] 03/02 0701 - 03/02 1900 In: 250 [I.V.:250] Out: 1 Physical Exam:         
Constitutional:  intubated and mechanically ventilated. with VC at present (placed on full support with PEG) HEENT:  Sclera clear, pupils equal, oral mucosa moist 
RESPIRATORY: rhonchi bilaterally. On full vent support at present. CARDIOVASCULAR:  RRR with no M,G,R; sinus per telemetry ABDOMEN/GI:  soft and with no tenderness; positive bowel sounds present EXTREMITIES:  warm with no cyanosis, no lower leg edema SKIN:  no jaundice or ecchymosis NEURO: opens eyes with stimulation and focuses on speaker. Tries to stick out tongue per command but can't quite do. Cannot answer questions MUSCULOSKELETAL: moves all four extremities. No deformities ROS: unable to obtain LINES:  PEG, coyle. Trach, quad lumen DRIPS:  None at present CHEST XRAY:  None today LAB Recent Labs 03/02/21 
1419 03/01/21 
0401 WBC 9.8 11.5* HGB 8.9* 8.2* HCT 27.6* 26.8*  
 224 Recent Labs 03/02/21 
6760 03/01/21 
0401  139  
K 3.5 3.5  99 CO2 36* 38* * 188* BUN 68* 75* CREA 1.19* 1.21* MG  --  2.3 PHOS  --  3.3 CA 10.0 9.8 Assessment:  (Medical Decision Making) Hospital Problems  Date Reviewed: 2/5/2021 Codes Class Noted POA Encephalopathy ICD-10-CM: G93.40 ICD-9-CM: 348.30  2/7/2021 Unknown Improving. Nurse reports that she can follow some commands. Reducing sedation Cardiac arrest [facility]) ICD-10-CM: I46.9 ICD-9-CM: 427.5  12/30/2020 No  
   
 COVID-19 ICD-10-CM: U07.1 ICD-9-CM: 079.89  12/24/2020 Yes Now off isolation * (Principal) Acute respiratory distress syndrome (ARDS) due to severe acute respiratory syndrome coronavirus 2 (SARS-CoV-2) ([facility]) ICD-10-CM: U07.1, J80 
ICD-9-CM: 518.82, 079.89  12/24/2020 No  
 Tolerated t collar for almost 12 hours yesterday. Placed on CPAP last night to rest  
 Acute hypoxemic respiratory failure ([facility] [address] ICD-10-CM: J96.01 
ICD-9-CM: 518.81  12/24/2020 Yes As above Acute on chronic renal failure (HCC) ICD-10-CM: N17.9, N18.9 ICD-9-CM: 584.9, 585.9  9/17/2019 Yes  
 resolved Coronary artery disease involving coronary bypass graft of native heart without angina pectoris (Chronic) ICD-10-CM: B75.309 ICD-9-CM: 414.05  6/15/2015 Yes  
 noted Uncontrolled type 2 diabetes mellitus with hyperglycemia (HCC) (Chronic) ICD-10-CM: E11.65 ICD-9-CM: 250.02  6/15/2015 Yes BS low to upper 100s Diabetes mellitus with hyperosmolarity without hyperglycemic hyperosmolar nonketotic coma (HCC) (Chronic) ICD-10-CM: E11.00 ICD-9-CM: 250.20  11/24/2014 Yes As above Plan:  (Medical Decision Making) 1. Have reduced Risperdal to 0.5 mg every HS for 3 days as taper - last dose will be on 3/3. Using Oxycodone prn for dyspnea. Will stop prn Morphine 2. BNP 3096 yesterday. Labs/blood pressure OK. FIO2 30%. Leave on 40 mg per day for now and monitor 3. Extend t collar trials as able. Was able to tolerate for almost 12 hours yesterday. Rest on CPAP at night for now but decrease nocturnal support every HS as tolerated 4. PEG placed today. 5. Have contacted [facility] again about bed. Patient is now more stable and transfer there now seems more appropriate. Otherwise, continue with weaning efforts and hopefully to floor soon and then to rehab. Will consult PT. 6. DNR - previous plan was to not start any new antibiotics for transfuse. May need to revisit this now that she has improved. 7. Try to find peripheral IV and remove central line 8. IV protonix and no anticoagulation with recent anemia with transfusion - mild gastritis noted with EGD. Hemoglobin has been stable. 9. Hold Lantus until TF restarted Sandy Salgado NP More than 50% of time documented was spent face-to-face contact with the patient and in the care of the patient on the floor/unit where the patient is located. I have spoken with and examined the patient. I agree with the above assessment and plan as documented. Gen: awake, eyes open Lungs: CTA Heart:  RRR with no Murmur/Rubs/Gallops Abd: NTND, new PEG Ext:  No edema, moves R side but not left Agree with plans for PT, [facility]. Wean off ventilator during the day to trach collar. Decreasing nocturnal ventilation nightly. Family updated.  
 
Radha Rolle MD

## 2021-03-02 NOTE — ANESTHESIA POSTPROCEDURE EVALUATION
Procedure(s): PERCUTANEOUS ENDOSCOPIC GASTROSTOMY TUBE INSERTION/ 25/ 329 ESOPHAGOGASTRODUODENOSCOPY (EGD). total IV anesthesia Anesthesia Post Evaluation Multimodal analgesia: multimodal analgesia not used between 6 hours prior to anesthesia start to PACU discharge Patient location during evaluation: ICU Patient participation: complete - patient cannot participate Pain management: adequate Airway patency: patent Anesthetic complications: no 
Cardiovascular status: acceptable Respiratory status: ETT Hydration status: acceptable Post anesthesia nausea and vomiting:  none INITIAL Post-op Vital signs:  
Vitals Value Taken Time /75 03/02/21 1005 Temp 36.7 °C (98 °F) 03/02/21 0859 Pulse 84 03/02/21 1007 Resp 27 03/02/21 1007 SpO2 99 % 03/02/21 1007 Vitals shown include unvalidated device data.

## 2021-03-02 NOTE — PROGRESS NOTES
ACUTE PHYSICAL THERAPY GOALS: 
(Developed with and agreed upon by patient and/or caregiver.) 1. Ms. Clary Crane will perform rolling left and right with mod assist of 1 in 7 days. 2.  Ms. Clary Crane will perform supine to sit with mod assist of 1 in 7 days. 3.  Ms. Clary Crane will perform active range of motion bilateral lower extremities x 20 reps in supine and sitting in 7 days. 4.  Ms. Clary Crane will perform sitting edge of bed unsupported and performing dynamic activity x >15 minutes. 5.  Ms. Clary Crane will perform bed to chair with mod assist in 7 days. PHYSICAL THERAPY ASSESSMENT: Initial Assessment, Daily Note and PM PT Treatment Day # 1 Derrick Gomez is a 78 y.o. female PRIMARY DIAGNOSIS: Acute respiratory distress syndrome (ARDS) due to severe acute respiratory syndrome coronavirus 2 (SARS-CoV-2) ([facility]) Acute respiratory distress syndrome (ARDS) due to severe acute respiratory syndrome coronavirus 2 (SARS-CoV-2) ([facility] [U07.1, J80] COVID-19 [U07.1] Procedure(s) (LRB): PERCUTANEOUS ENDOSCOPIC GASTROSTOMY TUBE INSERTION/ 25/ 329 (N/A) ESOPHAGOGASTRODUODENOSCOPY (EGD) (N/A) Day of Surgery Reason for Referral: ICD-10: Treatment Diagnosis: Generalized Muscle Weakness (M62.81) Difficulty in walking, Not elsewhere classified (R26.2) INPATIENT: Payor: [employer] MEDICARE / Plan: [employer] [address] / Product Type: [employer] Medicare /  
 
ASSESSMENT:  
 
REHAB RECOMMENDATIONS:  
Recommendation to date pending progress: 
Setting: 
? Short-term Rehab Equipment: ? To Be Determined PRIOR LEVEL OF FUNCTION: 
(Prior to Hospitalization) INITIAL/CURRENT LEVEL OF FUNCTION: 
(Most Recently Demonstrated) Bed Mobility: 
? Unknown Sit to Stand: 
? Unknown Transfers: 
? Unknown 
Gait/Mobility: 
? Unknown Bed Mobility: 
? Maximal Assistance Sit to Stand: 
? Unknown Transfers: 
? Unknown 
Gait/Mobility: 
? Unknown ASSESSMENT: 
 Ms. Marilin Ko has been in the hospital with COVID for 2.5 months. I am not sure of her prior level of function but she has been on vent and in bed unresponsive up until a few days ago when she started following commands. She is now on a trach collar on 9 liters o2. All vitals throughout treatment were stable. Suprisingly so. She tolerated sitting edge of the bed and even had trunk control maintaining unsupported sitting for periods of time. She follows commands but are inconsistent and a little slow to respond. At times you think she is totally following what you are saying and then she seems to check out. She is appropriate for skilled PT to maximize her rehab potential.  .  
 
SUBJECTIVE:  
Ms. Marilin Ko states, \"shook her head no to pain. Divina  \" SOCIAL HISTORY/LIVING ENVIRONMENT: unclear what prior function is. Has 2 daughters. Unclear how involved they are. OBJECTIVE:  
 
PAIN: VITAL SIGNS: LINES/DRAINS:  
Pre Treatment: Pain Screen Pain Scale 1: Adult Nonverbal Pain Scale Pain Intensity 1: 0 Post Treatment: 0 Vital Signs BP: 113/68 MAP (Calculated): 83 BP 1 Location: Right upper arm BP 1 Method: Automatic 
BP Patient Position: Sitting O2 Device: Tracheal collar GROSS EVALUATION: 
 Within Functional Limits Abnormal/ Functional Abnormal/ Non-Functional (see comments) Not Tested Comments: AROM [] [x] [] [] Grossly weak but does have movement PROM [] [] [] [] Strength [] [x] [] [] Trunk strong enough for edge of bed. Moves legs to commands Balance [] [x] [] [] Posture [] [] [] [] Sensation [] [] [] [] Coordination [] [] [] [] Tone [] [] [] [] Edema [] [] [] [] Activity Tolerance [] [] [] []   
 [] [] [] [] COGNITION/ 
PERCEPTION: Intact Impaired  
(see comments) Comments:  
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   
 [] [] MOBILITY: I Mod I S SBA CGA Min Mod Max Total  NT x2 Comments:  
Bed Mobility Rolling [] [] [] [] [] [] [] [x] [] [] [] Supine to Sit [] [] [] [] [] [] [] [x] [x] [] [x] Scooting [] [] [] [] [] [] [] [] [x] [] [] Sit to Supine [] [] [] [] [] [] [] [x] [x] [] [x] Transfers Sit to Stand [] [] [] [] [] [] [] [] [] [] [] Bed to Chair [] [] [] [] [] [] [] [] [] [] []   
Stand to Sit [] [] [] [] [] [] [] [] [] [] [] I=Independent, Mod I=Modified Independent, S=Supervision, SBA=Standby Assistance, CGA=Contact Guard Assistance,  
Min=Minimal Assistance, Mod=Moderate Assistance, Max=Maximal Assistance, Total=Total Assistance, NT=Not Tested GAIT: I Mod I S SBA CGA Min Mod Max Total  NT x2 Comments:  
Level of Assistance [] [] [] [] [] [] [] [] [] [] [] Distance DME Gait Quality Weightbearing Status I=Independent, Mod I=Modified Independent, S=Supervision, SBA=Standby Assistance, CGA=Contact Guard Assistance,  
Min=Minimal Assistance, Mod=Moderate Assistance, Max=Maximal Assistance, Total=Total Assistance, NT=Not Tested [facility] Basic Mobility Inpatient Short Form How much difficulty does the patient currently have. .. Unable A Lot A Little None 1. Turning over in bed (including adjusting bedclothes, sheets and blankets)? [] 1   [x] 2   [] 3   [] 4  
2. Sitting down on and standing up from a chair with arms ( e.g., wheelchair, bedside commode, etc.)   [x] 1   [] 2   [] 3   [] 4  
3. Moving from lying on back to sitting on the side of the bed? [] 1   [x] 2   [] 3   [] 4 How much help from another person does the patient currently need. .. Total A Lot A Little None 4. Moving to and from a bed to a chair (including a wheelchair)? [x] 1   [] 2   [] 3   [] 4  
5. Need to walk in hospital room? [x] 1   [] 2   [] 3   [] 4  
6. Climbing 3-5 steps with a railing?    [x] 1   [] 2   [] 3   [] 4  
 © 2007, Trustees of [address] [place] [address] [address] [address], under license to [employer]. All rights reserved Score:  Initial: 10 Most Recent: X (Date: -- ) Interpretation of Tool:  Represents activities that are increasingly more difficult (i.e. Bed mobility, Transfers, Gait). PLAN:  
FREQUENCY/DURATION: PT Plan of Care: 3 times/week for duration of hospital stay or until stated goals are met, whichever comes first. 
 
PROBLEM LIST:  
(Skilled intervention is medically necessary to address:) 1. Decreased Activity Tolerance 2. Decreased AROM/PROM 3. Decreased Balance 4. Decreased Gait Ability 5. Decreased Strength 6. Decreased Transfer Abilities INTERVENTIONS PLANNED:  
(Benefits and precautions of physical therapy have been discussed with the patient.) 1. Therapeutic Activity 2. Therapeutic Exercise/HEP 3. Gait Training 4. Education TREATMENT:  
 
EVALUATION: High Complexity : (Untimed Charge) TREATMENT:  
($$ Therapeutic Activity: 23-37 mins    ) Therapeutic Activity (25 Minutes): Therapeutic activity included Rolling, Supine to Sit, Sit to Supine, Scooting and Sitting balance  to improve functional Mobility, Strength, ROM and Activity tolerance. TREATMENT GRID: 
N/A 
 
AFTER TREATMENT POSITION/PRECAUTIONS: 
Bed, Needs within reach and RN notified INTERDISCIPLINARY COLLABORATION: 
RN/PCT and PT/PTA TOTAL TREATMENT DURATION: 
PT Patient Time In/Time Out Time In: 0131 Time Out: 1452 Ishan Linder, PT

## 2021-03-02 NOTE — ANESTHESIA PREPROCEDURE EVALUATION
Relevant Problems No relevant active problems Anesthetic History No history of anesthetic complications Review of Systems / Medical History Patient summary reviewed, nursing notes reviewed and pertinent labs reviewed Pulmonary Comments: Covid in Dec 2020- with resultant resp failure- trached Neuro/Psych Within defined limits Comments: Encephalopathy- waxing and waning mental status- follows some commands Cardiovascular Hypertension CAD and cardiac stents Exercise tolerance: <4 METS Comments: TTE 12/31/20- EF 60%, no signif valve abnl GI/Hepatic/Renal 
  
GERD Renal disease (recent ARF that required some dialysis- no longer requiring): CRI Endo/Other Diabetes Arthritis and anemia Other Findings Physical Exam 
 
Airway Tracheostomy present Cardiovascular Rhythm: regular Dental 
 
 
  
Pulmonary Rhonchi:bilateral 
 
 
 
 
 
 Abdominal 
 
 
 
 Other Findings Anesthetic Plan ASA: 4 Anesthesia type: total IV anesthesia Induction: Intravenous

## 2021-03-02 NOTE — PROGRESS NOTES
Patient agitated overnight and intermittently disconnecting self from vent, mitts placed back on patient for patient safety, intermittently hypothermic however refused shauna ángeler kicking it off every time RN placed it on patient, episode of hypoglycemia- given IV dextrose

## 2021-03-03 NOTE — PROGRESS NOTES
ACUTE OT GOALS: 
(Developed with and agreed upon by patient and/or caregiver.) 1. Patient will complete lower body bathing and dressing with Mod A and adaptive equipment as needed. 2. Patient will complete toileting with Mod A and adaptive equipment as needed. 3. Patient will complete BUE exercises to increase strength in BUE to at least a 3/5 for performance of ADLs and functional mobilty. 4. Patient will complete BUE exercises to increase coordination in E to [facility] for performance of ADLs. 5. Patient will complete seated grooming ADL with Set Up and good dynamic seated balance. 6. Patient will complete bed mobility for supine to sit with Mod A and one verbal cue for placement of UE to assist. 
7. Patient will attempt functional transfer from sit to stand with Max A and adaptive equipment as needed. Timeframe: 7 visits OCCUPATIONAL THERAPY ASSESSMENT: Initial Assessment, Daily Note and AM OT Treatment Day # 1 Mignon Stephens is a 78 y.o. female PRIMARY DIAGNOSIS: Acute respiratory distress syndrome (ARDS) due to severe acute respiratory syndrome coronavirus 2 (SARS-CoV-2) ([facility]) Acute respiratory distress syndrome (ARDS) due to severe acute respiratory syndrome coronavirus 2 (SARS-CoV-2) ([facility] [U07.1, J80] COVID-19 [U07.1] Procedure(s) (LRB): PERCUTANEOUS ENDOSCOPIC GASTROSTOMY TUBE INSERTION/ 25/ 329 (N/A) ESOPHAGOGASTRODUODENOSCOPY (EGD) (N/A) 1 Day Post-Op Reason for Referral:  Generalized Weakness ICD-10: Treatment Diagnosis: Generalized Muscle Weakness (M62.81) Other lack of cordination (R27.8) Difficulty in walking, Not elsewhere classified (R26.2) INPATIENT: Payor: [facility] MEDICARE / Plan: [address] / Product Type: Managed Care Medicare /  
ASSESSMENT:  
 
REHAB RECOMMENDATIONS:  
Recommendation to date pending progress: 
Setting: 
? [address] Equipment: ? To Be Determined PRIOR LEVEL OF FUNCTION: 
 (Prior to Hospitalization)  INITIAL/CURRENT LEVEL OF FUNCTION: 
(Based on today's evaluation) Bathing: ? Moderate Assistance Dressing: ? Moderate Assistance Feeding/Grooming: 
? Unknown Toileting: 
? Unknown Functional Mobility: 
? Unknown Bathing: ? Maximal Assistance Dressing: ? Maximal Assistance Feeding/Grooming: ? Moderate Assistance Toileting: ? Total Assistance Functional Mobility: 
? Maximal Assistance x 2 bed mobility. ASSESSMENT: 
Ms. Hailey Romero was admitted for acute respiratory distress syndrome. Pt is on trach and s/p peg tub placement. Pt presents with decreased strength, balance, and activity tolerance for performance of ADLs and functional mobility. Pt requires Max A x 2 to complete supine to sit. Seated edge of bed, pt presents with decreased static and dynamic balance with increased lean to L side. Pt able to sit unsupported very minimally with additional cueing to lean forward and orient to midline. Pt participated in BUE exercises and is observed to have increased ROM and strength in RUE compared to LUE. Pt requires Max A x 2 to complete sit to supine. Following return to supine, pt observed to be soiled and requires Total A for toilet hygiene. Pt would benefit from continued skilled OT to increase independence and safety for performance of ADLs and functional mobility. At this time, patient is appropriate for Co-treatment with physical therapy due to patient's decreased overall endurance/tolerance levels, as well as need for high level skilled assistance to complete functional transfers/mobility and functional tasks. Roxy Damon is appropriate for a multidisciplinary co-treatment of PT and OT to address goals of both disciplines. SUBJECTIVE:  
Ms. Hailey Romero mouths, \"I want water. \" SOCIAL HISTORY/LIVING ENVIRONMENT: Pt lives with daughter in trailer home with 8 TENA. Pt had aide 5 days per week, 4-5 hours per day to assist with bathing and dressing.  
 
OBJECTIVE:  
 
 PAIN: VITAL SIGNS: LINES/DRAINS:  
Pre Treatment: Pain Screen Pain Scale 1: FLACC Pain Intensity 1: 0 Post Treatment: Unchanged  Clifton Catheter and Vital Monitoring O2 Device: Tracheal collar GROSS EVALUATION: 
BUE Within Functional Limits Abnormal/ Functional Abnormal/ Non-Functional (see comments) Not Tested Comments: AROM [] [x] [] [] PROM [x] [] [] [] Strength [] [x] [] [] Balance [] [x] [] [] Posture [] [x] [] [] Sensation [] [] [] [x] Coordination [] [x] [] [] Tone [] [x] [] [] Increased tone in digits. Edema [] [x] [] [] Activity Tolerance [] [x] [] []   
 [] [] [] [] COGNITION/ 
PERCEPTION: Intact Impaired  
(see comments) Comments:  
Orientation [] [] NT due to trach placement. Vision [x] [] Pt able to follow objects in room. Hearing [x] [] Judgment/ Insight [] [] Attention [] [x] Memory [] [] NT due to trach placement. Command Following [] [x] Requires additional cueing to participate. Emotional Regulation [x] []   
 [] [] ACTIVITIES OF DAILY LIVING: I Mod I S SBA CGA Min Mod Max Total NT Comments BASIC ADLs:             
Bathing/ Showering [] [] [] [] [] [] [] [] [] [x] Toileting [] [] [] [] [] [] [] [] [] [x] Dressing [] [] [] [] [] [] [] [] [x] [] Total A for sock management. Feeding [] [] [] [] [] [] [x] [] [] [] To simulate self-feeding with use of mouth sponge. Grooming [] [] [] [] [] [] [] [] [] [x] Personal Device Care [] [] [] [] [] [] [] [] [] [x] Functional Mobility [] [] [] [] [] [] [] [x] [] [] Assist x 2 bed mobility. I=Independent, Mod I=Modified Independent, S=Supervision, SBA=Standby Assistance, CGA=Contact Guard Assistance,  
Min=Minimal Assistance, Mod=Moderate Assistance, Max=Maximal Assistance, Total=Total Assistance, NT=Not Tested MOBILITY: I Mod I S SBA CGA Min Mod Max Total  NT x2 Comments:  
Supine to sit [] [] [] [] [] [] [] [x] [] [] [x] Sit to supine [] [] [] [] [] [] [] [x] [] [] [x] Sit to stand [] [] [] [] [] [] [] [] [] [x] [] Bed to chair [] [] [] [] [] [] [] [] [] [x] [] I=Independent, Mod I=Modified Independent, S=Supervision, SBA=Standby Assistance, CGA=Contact Guard Assistance,  
Min=Minimal Assistance, Mod=Moderate Assistance, Max=Maximal Assistance, Total=Total Assistance, NT=Not Tested [facility] Daily Activity Inpatient Short Form How much help from another person does the patient currently need. .. Total A Lot A Little None 1. Putting on and taking off regular lower body clothing? [x] 1   [] 2   [] 3   [] 4  
2. Bathing (including washing, rinsing, drying)? [] 1   [x] 2   [] 3   [] 4  
3. Toileting, which includes using toilet, bedpan or urinal?   [x] 1   [] 2   [] 3   [] 4  
4. Putting on and taking off regular upper body clothing? [] 1   [x] 2   [] 3   [] 4  
5. Taking care of personal grooming such as brushing teeth? [] 1   [x] 2   [] 3   [] 4  
6. Eating meals? [] 1   [x] 2   [] 3   [] 4  
© 2007, Trustees of [address] [place] [address], under license to [employer]. All rights reserved Score:  Initial: 10, completed, 3/3/2021 Most Recent: X (Date: -- ) Interpretation of Tool:  Represents activities that are increasingly more difficult (i.e. Bed mobility, Transfers, Gait). PLAN:  
FREQUENCY/DURATION: OT Plan of Care: 3 times/week for duration of hospital stay or until stated goals are met, whichever comes first. 
 
PROBLEM LIST:  
(Skilled intervention is medically necessary to address:) 1. Decreased ADL/Functional Activities 2. Decreased Activity Tolerance 3. Decreased AROM/PROM 4. Decreased Balance 5. Decreased Cognition 6. Decreased Coordination 7. Decreased Gait Ability 8. Decreased Strength 9. Decreased Transfer Abilities INTERVENTIONS PLANNED:  
(Benefits and precautions of occupational therapy have been discussed with the patient.) 1. Self Care Training 2. Therapeutic Activity 3. Therapeutic Exercise/HEP 4. Neuromuscular Re-education 5. Education TREATMENT:  
 
EVALUATION: Moderate Complexity : (Untimed Charge) TREATMENT:  
Self Care (25 Minutes): Self care including Lower Body Dressing, Self Feeding and Grooming to increase independence and decrease level of assistance required. Therapeutic Exercise: (10 minutes):  Exercises below to improve mobility, strength, coordination and dynamic movement of arm - bilateral to improve functional bending, lifting and reaching. Required moderate visual, verbal, manual and tactile cues to promote proper body alignment, promote proper body posture and promote proper body mechanics. Progressed resistance, repetitions and complexity of movement as indicated. Date: 
3/3/2021 Date: 
 Date: Activity/Exercise Parameters Parameters Parameters BUE Shoulder Flexion/Extension 5 reps each arm BUE Elbow Flexion/Extension 5 reps each arm    
BUE Forward Reaching  3 reps each arm    
BUE Crossing Midline 2 reps each arm AFTER TREATMENT POSITION/PRECAUTIONS: 
Bed, Needs within reach, RN notified and pt family at bedside. INTERDISCIPLINARY COLLABORATION: 
RN/PCT, PT/PTA and OT/PERALTA TOTAL TREATMENT DURATION: 
OT Patient Time In/Time Out Time In: 1027 Time Out: 1118 NARCISO Barkley/ORTIZ

## 2021-03-03 NOTE — PROGRESS NOTES
Care Management Interventions PCP Verified by CM: Cyrus Durham MD) Mode of Transport at Discharge: Other (see comment)(To [facility]) Transition of Care Consult (CM Consult): Discharge Planning, LTAC Discharge Durable Medical Equipment: No 
Physical Therapy Consult: Yes Occupational Therapy Consult: Yes Speech Therapy Consult: Yes Current Support Network: Lives with Caregiver, Family Lives [place] Confirm Follow Up Transport: Family The Plan for Transition of Care is Related to the Following Treatment Goals : LTAC The Patient and/or Patient Representative was Provided with a Choice of Provider and Agrees with the Discharge Plan?: Yes Name of the Patient Representative Who was Provided with a Choice of Provider and Agrees with the Discharge Plan: Kalpana, Rayshawn Powell and Marianna Flores [place] of Choice List was Provided with Basic Dialogue that Supports the Patient's Individualized Plan of Care/Goals, Treatment Preferences and Shares the Quality Data Associated with the Providers?: Yes [employer] Information Provided?: No 
Discharge Location Discharge Placement: [address] [place] [address]) CM talked with pt and later her daughters (both visiting) on rounds. CM able to read pt's lips-\"When can I have water? \" CM updated the daughters on the precert that was begun 3/2 for LTAC. CM discussed SNFs with Rayshawn Powell after pt's stay (if needed) at [facility]. She verbalizes understanding.   
CM to continue following pt's progress to update DCP

## 2021-03-03 NOTE — PROGRESS NOTES
Bedside, Verbal and Written shift change report given to [address] [place] [address] Road  (oncoming nurse) by Che Benjamin RN  (offgoing nurse). Report included the following information SBAR, Kardex, ED Summary, Procedure Summary, Intake/Output, MAR, Med Rec Status, Cardiac Rhythm NSR, Alarm Parameters  and Quality Measures PT REPOSITIONED  
 
PT ON TRACH COLLAR 35%

## 2021-03-03 NOTE — PROGRESS NOTES
[facility] Progress / Sign-off Note Admit Date:  12/24/2020 Today's Date:  3/3/2021 CC:  Difficulty swallowing Subjective:  
 
Patient s/p PEG yesterday. No abdominal pain. Per nursing, tolerates meds through PEG. Medications:  
Current Facility-Administered Medications Medication Dose Route Frequency  risperiDONE (RisperDAL) 1 mg/mL oral solution soln 0.5 mg  0.5 mg Nasogastric QHS  diclofenac (VOLTAREN) 1 % topical gel 2 g  2 g Topical Q6H PRN  
 diatrizoate leonidas-diatrizoat sod (MD-GASTROVIEW,GASTROGRAFIN) 66-10 % contrast solution 30 mL  30 mL InterCATHeter RAD ONCE  
 oxyCODONE IR (ROXICODONE) tablet 5 mg  5 mg Per NG tube Q8H PRN  
 [Held by provider] insulin glargine (LANTUS) injection 20 Units  20 Units SubCUTAneous DAILY  loperamide (IMODIUM) 1 mg/7.5 mL oral solution 2 mg  2 mg Per NG tube QID PRN  pantoprazole (PROTONIX) 40 mg in 0.9% sodium chloride 10 mL injection  40 mg IntraVENous DAILY  Saccharomyces boulardii (FLORASTOR) capsule 250 mg  250 mg Per NG tube BID  
 guaiFENesin (ROBITUSSIN) 100 mg/5 mL oral liquid 100 mg  100 mg Per NG tube Q4H PRN  
 insulin regular (NOVOLIN R, HUMULIN R) injection   SubCUTAneous Q6H  
 albuterol (PROVENTIL VENTOLIN) nebulizer solution 2.5 mg  2.5 mg Nebulization Q6H RT  
 hydrALAZINE (APRESOLINE) 20 mg/mL injection 10 mg  10 mg IntraVENous Q6H PRN  
 docusate (COLACE) 50 mg/5 mL oral liquid 100 mg  100 mg Per NG tube DAILY PRN  
 heparin (porcine) 1,000 unit/mL injection 5,000 Units  5,000 Units IntraVENous DIALYSIS PRN  
 alcohol 62% (NOZIN) nasal  1 Ampule  1 Ampule Topical Q12H  
 [Held by provider] heparin (porcine) injection 5,000 Units  5,000 Units SubCUTAneous Q8H  
 bisacodyL (DULCOLAX) suppository 10 mg  10 mg Rectal DAILY PRN  
 NUTRITIONAL SUPPORT ELECTROLYTE PRN ORDERS   Does Not Apply PRN  
 dextrose 40% (GLUTOSE) oral gel 1 Tube  15 g Oral PRN  
  glucagon (GLUCAGEN) injection 1 mg  1 mg IntraMUSCular PRN  
 dextrose (D50W) injection syrg 12.5-25 g  25-50 mL IntraVENous PRN  
 albuterol (PROVENTIL HFA, VENTOLIN HFA, PROAIR HFA) inhaler 2 Puff  2 Puff Inhalation Q4H PRN  phenol throat spray (CHLORASEPTIC) 1 Spray  1 Spray Oral PRN  
 lip protectant (BLISTEX) ointment 1 Each  1 Each Topical PRN  
 acetaminophen (TYLENOL) tablet 650 mg  650 mg Oral Q6H PRN  
 sodium chloride (NS) flush 5-40 mL  5-40 mL IntraVENous Q8H  
 sodium chloride (NS) flush 5-40 mL  5-40 mL IntraVENous PRN Review of Systems: ROS was obtained, with pertinent positives as listed above. No chest pain or SOB. Diet:   
 
Objective:  
Vitals: 
Visit Vitals BP (!) 146/75 Pulse 79 Temp 97 °F (36.1 °C) Resp 27 Ht 5' 5\" (1.651 m) Wt 62.9 kg (138 lb 11.2 oz) SpO2 99% BMI 23.08 kg/m² Intake/Output: 
No intake/output data recorded. 03/01 1901 - 03/03 0700 In: 250 [I.V.:250] Out: 3646 [[id number]] Exam: 
General appearance: alert, cooperative, NAD. Abdomen: soft, non-tender. NABS. PEG intact with small amounts of dry old blood surrounding PEG, no active bleeding. Clean dry dressing. Extremities: extremities normal, atraumatic, no cyanosis or edema Data Review (Labs):   
Recent Labs 03/02/21 
2150 03/01/21 
0401 WBC 9.8 11.5* HGB 8.9* 8.2* HCT 27.6* 26.8*  
 224 MCV 95.2 97.1  139  
K 3.5 3.5  99 CO2 36* 38* BUN 68* 75* CREA 1.19* 1.21* CA 10.0 9.8 MG  --  2.3 * 188* Assessment:  
 
Principal Problem: 
Acute respiratory distress syndrome (ARDS) due to severe acute respiratory syndrome coronavirus 2 (SARS-CoV-2) ([facility] (12/24/2020) Active Problems: 
Diabetes mellitus with hyperosmolarity without hyperglycemic hyperosmolar nonketotic coma ([facility] (11/24/2014) Coronary artery disease involving coronary bypass graft of native heart without angina pectoris (6/15/2015) Uncontrolled type 2 diabetes mellitus with hyperglycemia ([facility] (6/15/2015) Acute on chronic renal failure ([facility] (9/17/2019) COVID-19 (12/24/2020) Acute hypoxemic respiratory failure ([facility] (12/24/2020) Cardiac arrest ([facility] (12/30/2020) Encephalopathy (2/7/2021) 79 yo female with h/o DM admitted 12/24 with acute respiratory failure secondary to COVID 19, complicated by acute on chronic renal failure requiring short term dialysis, cardiac arrest on 12/30 requiring intubation, subsequently with trach placed 1/19/21, E. Coli UTI, MRSA in her sputum, AMS with EEG showing moderate to severe slowing, brain MRI 1/26 revealed stable age-related senescent changes and chronic microvascular disease with remote right posterior temporal occipital ischemic insult and seen by GI for evaluation for PEG tube placement. She was seen by Dr. Jose Purdy with our group earlier this admission on 1/25/21 for eval for PEG, but at that time, long term prognosis was uncertain and the patient's daughter decided to hold off. Now, patient has improved some-- tolerating T collar trials (was able to tolerate for almost 6 hours yesterday) and felt to be able to follow some commands--and daughter would like to proceed with PEG tube placement. PEG 3/2 by Dr. Juliano To Normal EGD with mild gastritis, PEG placed Plan:  
 
OK to use PEG for meds and feeds. Signing-off. Pls call if we can be of further assistance. Chacho Jama PA-C [facility] Patient seen and examined. Agree with above. Patient requesting water. Denies abdominal pain. Tube feedings started and tolerating well. Abdomen soft and non tender with external bolster at 3+ cms. Discussed assessment and plans with patient and Nursing staff. Discussed with RN - will provide swabs to moisturize mouth till evaluated by Speech Path. Routine PEG care. Will sign off. Call for questions or concerns. Josiah To MD

## 2021-03-03 NOTE — PROGRESS NOTES
This RRT called because of patient O2 desaturation into 70's. Patient in 52's when RRT arrived, with nurse bagging with AMBU. Patient placed back on the ventilator on CPAP but patient apneic and unable to pull volumes, so patient placed back on to a rate on SIMV mode. Patient saturation now in the mid to upper 90's. Will attempt to wean patient back to CPAP mode and eventually trach collar later. Patient tolerating SIMV at this time. Will continue to monitor.

## 2021-03-03 NOTE — PROGRESS NOTES
Comprehensive Nutrition Assessment Type and Reason for Visit: Terrie Side Tube Feeding Management (Pulmonary) Nutrition Recommendations/Plan: ? Enteral Nutrition: 
? Continue current TF regimen: 
? Nepro @ 35 ml/hr. ? 1 pouch prosource TF daily with 50 ml water flush before and after administration. ? 50 ml water flush/hour. Malnutrition Assessment: 
Malnutrition Status: Insufficient data Nutrition Assessment:  
Nutrition History: 12/31: 3 recorded meals in past 6 days with average intake of 83% Nutrition Background: H/O: CAD, HTN, DM, HLD, peripheral neuropathy, CKD stage III. Presented with dyspnea. Findings of +COVID. Admitted to ICU with ARDS, severe acute hypoxic due to COVID PNA, PRATIMA on CKD. Was requiring BIPAP at night and Aviro during day. Had brief cardiac arrest and was intubated 12/30 Daily Update: 
TF resumed today. Infusing at goal at RD visit. Pt asks when she can have water. When told she could not at this time she looks away and doesn't attempt any additional conversation. Abdominal Status (last documented): Soft abdomen with Active  bowel sounds. Last BM 03/03/21. Pertinent Medications: lantus (20 units currently held), SSI (helds), protonix, florastor Pertinent Labs:  
Lab Results Component Value Date/Time Sodium 141 03/02/2021 05:27 AM  
 Potassium 3.5 03/02/2021 05:27 AM  
 Chloride 101 03/02/2021 05:27 AM  
 CO2 36 (H) 03/02/2021 05:27 AM  
 Anion gap 4 (L) 03/02/2021 05:27 AM  
 Glucose 114 (H) 03/02/2021 05:27 AM  
 BUN 68 (H) 03/02/2021 05:27 AM  
 Creatinine 1.19 (H) 03/02/2021 05:27 AM  
 Calcium 10.0 03/02/2021 05:27 AM  
 Albumin 3.0 (L) 01/06/2021 02:02 AM  
 Magnesium 2.3 03/01/2021 04:01 AM  
 Phosphorus 3.3 03/01/2021 04:01 AM  
 
Nutrition Related Findings: Intubated and OGT placed 12/30. CRRT initiated on 1/12, last was 1/23. TF formula changed to a renal formula on 1/11. s/p Trach 1/19, NG placed 1/19. NGT replaced 1/29. PMV trials. 20 Fr PEG placed 3/2. SLP plans MBS 3/4. Wound Type: Stage II(to elbow) Current Nutrition Therapies: DIET TUBE FEEDING Administer 1 pouches Prosource TF via open syringe into FT @ 1800 daily. Flush FT with 50 ml water before and after administration of protein. DIET NPO Except Meds Current Tube Feeding (TF) Orders: · Feeding Route: PEG/J 
· Formula: Nepro · Schedule:Continuous · Regimen: 35 ml/hr · Additives/Modulars: Protein(1 packet prosource TF per day with 50ml water bofore/after) · Water Flushes: 50 ml/hr · Current TF & Flush Orders Provides: infusing at goal 
· Goal TF & Flush Orders Provides: 1426 kcal (100% needs), 73 g pro (100% needs), and 1760 ml free water (1.2 ml/kcal) calculations based on 22 hr infusion Current Intake: Average Meal Intake: NPO Average Supplement Intake: NPO Additional Caloric Sources: ( ) Anthropometric Measures: 
Height: 5' 5\" (165.1 cm) Current Body Wt: 66.5 kg (146 lb 9.7 oz)(2/24), Weight source: Bed scale BMI: 24.4, Normal weight (BMI 22.0-24.9) age over 72 Admission Body Weight: 159 lb 2.8 oz(12/25 bed scale) Ideal Body Weight (lbs) (Calculated): 125 lbs (57 kg), 123.8 % Usual Body Wt: 73.5 kg (162 lb)(Per EMR 11/5/82020), Percent weight change: -4.5 Edema: Generalized: Trace (3/3/2021  7:00 AM) LLE: Trace (3/3/2021  7:00 AM) LUE: Trace (3/3/2021  7:00 AM) RLE: Trace (3/3/2021  7:00 AM) RUE: Trace (3/3/2021  7:00 AM) Estimated Daily Nutrient Needs: 
Energy (kcal/day): 7136-9265 (Kcal/kg(25-30), Weight Used: Ideal(56.8)) Protein (g/day): 68-86 (1.2-1.5 g/kg) Weight Used: (Ideal) Fluid (ml/day):   (1 ml/kcal) Nutrition Diagnosis: · Inadequate oral intake related to impaired respiratory function as evidenced by Kay Steve, NPO per SLP, PEG for primary needs) Nutrition Interventions:  
Food and/or Nutrient Delivery: Continue NPO, Continue tube feeding Coordination of Nutrition Care: Continue to monitor while inpatient Plan of Care discussed with Guillermo Fregoso Goals:  
Previous Goal Met: Goal(s) achieved Active Goal: Maintain TF to meet estimated needs Nutrition Monitoring and Evaluation:  
  
Food/Nutrient Intake Outcomes: Enteral nutrition intake/tolerance, Diet advancement/tolerance Physical Signs/Symptoms Outcomes: Biochemical data, Chewing or swallowing, GI status, Weight Discharge Planning:   
Enteral nutrition Rodo Morrow, MICHELLE, LDN on 3/3/2021 at 4:31 PM 
Contact: [phone]

## 2021-03-03 NOTE — PROGRESS NOTES
MD Villegas instructed RN to order KUB prior to administering medications through newly placed PEG, PO medications held 
 
agitated overnight attempting to bite off oxygen prob- mitts placed back on patient

## 2021-03-03 NOTE — WOUND CARE
Pt seen for follow up on anal/sacral area and bilat heels. Pt turned with primary RN, noted sacral/anal area intact, continue foam dressing and frequent turning and repositioning as ordered. Bilat heels also intact, continue current plan. Pt turned to right side with pillows. Wound team will loosely follow while in acute care setting.

## 2021-03-03 NOTE — PROGRESS NOTES
Care Daily Progress Note: 3/3/2021 Admission Date: 12/24/2020 The patient's chart is reviewed and the patient is discussed with the staff. 77 yo female admitted 12/24 with acute respiratory failure secondary to COVID 19.  Pt was treated with Decadron and convalescent plasma. She had PRATIMA and was not treated with Remdesivir.  She was initially on BiPAP but pt had a  cardiac arrest on 12/30 requiring intubation.  She required prolonged vent support so a trach was placed on 1/19. Pt has been treated for an E coli UTII as well as MRSA in her sputum. She developed acute on chronic renal failure and started on dialysis on 1/12 but now off of since 1/25 and there are no plans to restart. Nydia Murcia consulted for PEG but this on hold pending family discussion regarding need for long term care. Pt was seen by Neurology with an abnormal EEG with moderate to severe slowing. Brain MRI 1/26 revealed stable age-related senescent changes and chronic microvascular disease with remote right posterior temporal occipital ischemic insult. No evidence anoxic brain injury per neuro - deep encephalopathy. Have gradually been able to reduce sedatives. Now tolerating T collar trials and felt to be able to follow some commands. PEG placed 3/2. Now able to follow commands and getting limited prn sedation. Subjective: Had to go back on vent yesterday about 7 pm due to hypoxia. Replaced on t piece this AM.  More alert today and following commands. Denies pain or dyspnea. Current Facility-Administered Medications Medication Dose Route Frequency  diclofenac (VOLTAREN) 1 % topical gel 2 g  2 g Topical Q6H PRN  
 diatrizoate leonidas-diatrizoat sod (MD-GASTROVIEW,GASTROGRAFIN) 66-10 % contrast solution 30 mL  30 mL InterCATHeter RAD ONCE  
 risperiDONE (RisperDAL) 1 mg/mL oral solution soln 0.5 mg  0.5 mg Nasogastric QHS  oxyCODONE IR (ROXICODONE) tablet 5 mg  5 mg Per NG tube Q8H PRN  
  [Held by provider] insulin glargine (LANTUS) injection 20 Units  20 Units SubCUTAneous DAILY  loperamide (IMODIUM) 1 mg/7.5 mL oral solution 2 mg  2 mg Per NG tube QID PRN  pantoprazole (PROTONIX) 40 mg in 0.9% sodium chloride 10 mL injection  40 mg IntraVENous DAILY  Saccharomyces boulardii (FLORASTOR) capsule 250 mg  250 mg Per NG tube BID  
 guaiFENesin (ROBITUSSIN) 100 mg/5 mL oral liquid 100 mg  100 mg Per NG tube Q4H PRN  
 insulin regular (NOVOLIN R, HUMULIN R) injection   SubCUTAneous Q6H  
 albuterol (PROVENTIL VENTOLIN) nebulizer solution 2.5 mg  2.5 mg Nebulization Q6H RT  
 hydrALAZINE (APRESOLINE) 20 mg/mL injection 10 mg  10 mg IntraVENous Q6H PRN  
 docusate (COLACE) 50 mg/5 mL oral liquid 100 mg  100 mg Per NG tube DAILY PRN  
 heparin (porcine) 1,000 unit/mL injection 5,000 Units  5,000 Units IntraVENous DIALYSIS PRN  
 alcohol 62% (NOZIN) nasal  1 Ampule  1 Ampule Topical Q12H  
 [Held by provider] heparin (porcine) injection 5,000 Units  5,000 Units SubCUTAneous Q8H  
 bisacodyL (DULCOLAX) suppository 10 mg  10 mg Rectal DAILY PRN  
 NUTRITIONAL SUPPORT ELECTROLYTE PRN ORDERS   Does Not Apply PRN  
 dextrose 40% (GLUTOSE) oral gel 1 Tube  15 g Oral PRN  
 glucagon (GLUCAGEN) injection 1 mg  1 mg IntraMUSCular PRN  
 dextrose (D50W) injection syrg 12.5-25 g  25-50 mL IntraVENous PRN  
 albuterol (PROVENTIL HFA, VENTOLIN HFA, PROAIR HFA) inhaler 2 Puff  2 Puff Inhalation Q4H PRN  phenol throat spray (CHLORASEPTIC) 1 Spray  1 Spray Oral PRN  
 lip protectant (BLISTEX) ointment 1 Each  1 Each Topical PRN  
 acetaminophen (TYLENOL) tablet 650 mg  650 mg Oral Q6H PRN  
 sodium chloride (NS) flush 5-40 mL  5-40 mL IntraVENous Q8H  
 sodium chloride (NS) flush 5-40 mL  5-40 mL IntraVENous PRN Objective:  
 
Vitals:  
 03/03/21 0600 03/03/21 0630 03/03/21 0745 03/03/21 0759 BP: (!) 147/74 134/70  (!) 143/75 Pulse: 79 80 80 78 Resp: 18 18 20 17  
 Temp:      
SpO2: 100% 100% 100% 100% Weight: 138 lb 11.2 oz (62.9 kg) Height:      
 
 
Intake and Output:  
03/01 1901 - 03/03 0700 In: 250 [I.V.:250] Out: 4698 [[id number]] No intake/output data recorded. Physical Exam:         
Constitutional:  Awake and alert - focuses, follows commands and nods to answer questions. Can speak with trach occluded HEENT:  Sclera clear, pupils equal, oral mucosa moist 
RESPIRATORY:  Clear bilaterally. On t collar at present CARDIOVASCULAR:  RRR with no M,G,R; sinus per telemetry ABDOMEN/GI:  soft and with no tenderness; positive bowel sounds present EXTREMITIES:  warm with no cyanosis, no lower leg edema SKIN:  no jaundice or ecchymosis NEURO: awake and alert. Seems appropriate MUSCULOSKELETAL: moves all four extremities. No deformities ROS: some confusion overnight No dyspnea Denies pain Generalized weakness 
            thirsty LINES:  PEG, coyle. Trach, peripheral IV 
 
DRIPS:  None at present CHEST XRAY:  None today LAB Recent Labs 03/02/21 
3772 03/01/21 
0401 WBC 9.8 11.5* HGB 8.9* 8.2* HCT 27.6* 26.8*  
 224 Recent Labs 03/02/21 
4156 03/01/21 
0401  139  
K 3.5 3.5  99 CO2 36* 38* * 188* BUN 68* 75* CREA 1.19* 1.21* MG  --  2.3 PHOS  --  3.3 CA 10.0 9.8 Assessment:  (Medical Decision Making) Hospital Problems  Date Reviewed: 2/5/2021 Codes Class Noted POA Encephalopathy ICD-10-CM: G93.40 ICD-9-CM: 348.30  2/7/2021 Unknown Much improved. Some restlessness overnight. Wearing mitts now. Much more alert today and interactive Cardiac arrest [facility]) ICD-10-CM: I46.9 ICD-9-CM: 427.5  12/30/2020 No  
   
 COVID-19 ICD-10-CM: U07.1 ICD-9-CM: 079.89  12/24/2020 Yes Now off isolation * (Principal) Acute respiratory distress syndrome (ARDS) due to severe acute respiratory syndrome coronavirus 2 (SARS-CoV-2) ([facility]) ICD-10-CM: U07.1, J80 
ICD-9-CM: 518.82, 079.89  12/24/2020 No  
 Tolerated t collar for the afternoon yesterday but then desated around 7 pm so replaced on ventilator. Back on t piece this AM  
 Acute hypoxemic respiratory failure ([address] ICD-10-CM: J96.01 
ICD-9-CM: 518.81  12/24/2020 Yes As above - 40% Acute on chronic renal failure (HCC) ICD-10-CM: N17.9, N18.9 ICD-9-CM: 584.9, 585.9  9/17/2019 Yes  
 resolved Coronary artery disease involving coronary bypass graft of native heart without angina pectoris (Chronic) ICD-10-CM: H52.578 ICD-9-CM: 414.05  6/15/2015 Yes  
 noted Uncontrolled type 2 diabetes mellitus with hyperglycemia (HCC) (Chronic) ICD-10-CM: E11.65 ICD-9-CM: 250.02  6/15/2015 Yes BS low to 100s Diabetes mellitus with hyperosmolarity without hyperglycemic hyperosmolar nonketotic coma (HCC) (Chronic) ICD-10-CM: E11.00 ICD-9-CM: 250.20  11/24/2014 Yes As above Plan:  (Medical Decision Making) 1. Have reduced Risperdal to 0.5 mg every HS for 3 days as taper - last dose will be on 3/3. Could not give last night due to new PEG. Some restlessness overnight but no other sedation. Now wearing mitts. Will continue Risperdal at [facility] for now until restlessness improves, then stop 2. BNP up to 3096 on Monday. Extra lasix this week and fluid balance neg 706 over past 24 hours. Will hold lasix today with TF off. Restart when needed 3. Extend t collar trials as able. Had to go back on vent around 7pm last night due to desaturation. Rest on CPAP at night for now but decrease nocturnal support every HS as tolerated. Has cuffed, #6 XLT trach in place 4. PEG placed yesterday - KUB pending. TF on hold for now. Consult ST for swallow eval and PMV 5. Have contacted [facility] again about bed. Patient is now more stable and transfer there now seems more appropriate. Otherwise, continue with weaning efforts and hopefully to floor soon and then to rehab. PT seeing. 6. DNR - previous plan was to not start any new antibiotics for transfuse. May need to revisit this now that she has improved. Will discuss with daughter, Maya Luu. 7. Central line out, coyle remains - will remove 8. IV protonix and no anticoagulation with recent anemia with transfusion - mild gastritis noted with EGD. Hemoglobin has been stable. 9. Hold Lantus until TF restarted 10. Hypertensive overnight - home meds reviewed - was on Cozaar and Lopressor. Will restart when allowed to use PEG or PO allowed. Prn Apresoline for now Vinicio Goldman NP More than 50% of time documented was spent face-to-face contact with the patient and in the care of the patient on the floor/unit where the patient is located. I have spoken with and examined the patient. I agree with the above assessment and plan as documented. Lungs:  clear Heart:  RRR with no Murmur/Rubs/Gallops Additional Comments:  As above, much improved, speech to see, will get [facility] referral again I have spoken with and examined the patient. I agree with the above assessment and plan as documented.  
 
Yan Reis MD

## 2021-03-03 NOTE — PROGRESS NOTES
Ventilator check complete; patient has a #6 tracheostomy. Breath sounds are coarse. Trachea is midline, Negative for subcutaneous air, and chest excursion is symmetric. Patient is also Negative for cyanosis and is Negative for pitting edema. All alarms are set and audible. Resuscitation bag is at the head of the bed. Ventilator Settings Mode FIO2 Rate Tidal Volume Pressure PEEP I:E Ratio SIMV, VC+, Pressure support  30 %    400 ml  8 cm H2O  8 cm H20  1:1.9 Peak airway pressure: 17 cm H2O Minute ventilation: 11.7 l/min Avani Mirlionel, RT

## 2021-03-03 NOTE — PROGRESS NOTES
ACUTE PHYSICAL THERAPY GOALS: 
(Developed with and agreed upon by patient and/or caregiver.) 1. Ms. Vannesa Miller will perform rolling left and right with mod assist of 1 in 7 days. 2.  Ms. Vannesa Miller will perform supine to sit with mod assist of 1 in 7 days. 3.  Ms. Vannesa Miller will perform active range of motion bilateral lower extremities x 20 reps in supine and sitting in 7 days. 4.  Ms. Vannesa Miller will perform sitting edge of bed unsupported and performing dynamic activity x >15 minutes. 5.  Ms. Vannesa Miller will perform bed to chair with mod assist in 7 days. PHYSICAL THERAPY: Daily Note and AM Treatment Day # 2 Marcia Arnold is a 78 y.o. female PRIMARY DIAGNOSIS: Acute respiratory distress syndrome (ARDS) due to severe acute respiratory syndrome coronavirus 2 (SARS-CoV-2) ([facility]) Acute respiratory distress syndrome (ARDS) due to severe acute respiratory syndrome coronavirus 2 (SARS-CoV-2) ([facility] [U07.1, J80] COVID-19 [U07.1] Procedure(s) (LRB): PERCUTANEOUS ENDOSCOPIC GASTROSTOMY TUBE INSERTION/ 25/ 329 (N/A) ESOPHAGOGASTRODUODENOSCOPY (EGD) (N/A) 1 Day Post-Op ASSESSMENT:  
 
REHAB RECOMMENDATIONS: CURRENT LEVEL OF FUNCTION: 
(Most Recently Demonstrated) Recommendation to date pending progress: 
Setting: 
? Short-term Rehab Equipment: ? To Be Determined Bed Mobility: 
? Maximal Assistance x 2 Sit to Stand: 
? Not tested Transfers: 
? Not tested Gait/Mobility: 
? Not tested ASSESSMENT: 
 Ms. iNlsa Story presents sidelying in bed upon entering room. Pt demonstrates improvements in seated balance as well as overall activity tolerance. She was able to perform sitting EOB for several minutes at SBA and perform LAQ without increased need for stability. Pt continues to demonstrate fatigue with transfers and mobility and would continue to benefit from skilled PT to maximize functional strength. Pt would continue to benefit from co-treatments with OT as she is medically complex. Pt would benefit from attempted stand at next visit. SUBJECTIVE:  
Ms. Nilsa Story nods appropriately and mouths that she would like some water SOCIAL HISTORY/ LIVING ENVIRONMENT: Pt was previously living with her daughter in a trailer and independent with all ADLs. Daughter was present for part of the treatment. OBJECTIVE:  
 
PAIN: VITAL SIGNS: LINES/DRAINS:  
Pre Treatment: Pain Screen Pain Scale 1: Numeric (0 - 10) Pain Intensity 1: 0 Post Treatment: 0 Vital Signs O2 Sat (%): 99 % O2 Device: Tracheal collar O2 Flow Rate (L/min): 10 l/min [place] Catheter O2 Device: Tracheal collar MOBILITY: I Mod I S SBA CGA Min Mod Max Total  NT x2 Comments:  
Bed Mobility Rolling [] [] [] [] [] [x] [x] [] [] [] [x] Min A rolling to L, mod-max rolling R Supine to Sit [] [] [] [] [] [] [] [x] [] [] [x] Scooting [] [] [] [] [] [] [] [] [x] [] [x] Sit to Supine [] [] [] [] [] [] [] [x] [] [] [x] Transfers Sit to Stand [] [] [] [] [] [] [] [] [] [x] [] Bed to Chair [] [] [] [] [] [] [] [] [] [x] [] Stand to Sit [] [] [] [] [] [] [] [] [] [x] [] I=Independent, Mod I=Modified Independent, S=Supervision, SBA=Standby Assistance, CGA=Contact Guard Assistance,  
Min=Minimal Assistance, Mod=Moderate Assistance, Max=Maximal Assistance, Total=Total Assistance, NT=Not Tested BALANCE: Good Fair+ Fair Fair- Poor NT Comments Sitting Static [] [x] [] [] [] [] Sitting Dynamic [] [] [x] [] [] [] Standing Static [] [] [] [] [] [x] Standing Dynamic [] [] [] [] [] [x] GAIT: I Mod I S SBA CGA Min Mod Max Total  NT x2 Comments:  
Level of Assistance [] [] [] [] [] [] [] [] [] [x] [] Distance NA   
DME None Gait Quality NA Weightbearing Status N/A I=Independent, Mod I=Modified Independent, S=Supervision, SBA=Standby Assistance, CGA=Contact Guard Assistance,  
Min=Minimal Assistance, Mod=Moderate Assistance, Max=Maximal Assistance, Total=Total Assistance, NT=Not Tested PLAN:  
FREQUENCY/DURATION: PT Plan of Care: 3 times/week for duration of hospital stay or until stated goals are met, whichever comes first. 
TREATMENT:  
 
TREATMENT:  
($$ Neuromuscular Re-education: 38-52 mins    ) Co-Treatment PT/OT necessary due to patient's decreased overall endurance/tolerance levels, as well as need for high level skilled assistance to complete functional transfers/mobility and functional tasks Neuromuscular Re-education (51 Minutes): Neuromuscular Re-education included Balance Training, Postural training and Sitting balance training to improve Balance, Functional Mobility and Postural Control. TREATMENT GRID: 
N/A 
 
AFTER TREATMENT POSITION/PRECAUTIONS: 
Bed, Needs within reach, RN notified and Visitors at bedside INTERDISCIPLINARY COLLABORATION: 
RN/PCT, PT/PTA and OT/PERALTA TOTAL TREATMENT DURATION: 
PT Patient Time In/Time Out Time In: 1027 Time Out: 1118 Carmina Don [place]

## 2021-03-03 NOTE — PROGRESS NOTES
STG: Patient will tolerate continua speaking valve wear without respiratory decline. STG: Patient will demonstrate independence with doning and doffing speaking valve. LTG: Patient will tolerate passy bill speaking valve to communicate wants and needs without respiratory decline,. STG: Patient will participate in dysphagia assessment to determine appropriateness and safety with po intake LTG: Patient will tolerate least restrictive diet without overt signs or symptoms of airway compromise. STG: Patient will participate in modified barium swallow study to objectively assess swallow function. SPEECH LANGUAGE PATHOLOGY: PASSY BILL SPEAKING VALVE AND DYSPHAGIA- Initial Assessment NAME/AGE/GENDER: Stephan Birmingham is a 78 y.o. female DATE: 3/3/2021 PRIMARY DIAGNOSIS: Acute respiratory distress syndrome (ARDS) due to severe acute respiratory syndrome coronavirus 2 (SARS-CoV-2) ([facility] [U07.1, J80] COVID-19 [U07.1] Procedure(s) (LRB): PERCUTANEOUS ENDOSCOPIC GASTROSTOMY TUBE INSERTION/ 25/ 329 (N/A) ESOPHAGOGASTRODUODENOSCOPY (EGD) (N/A) 1 Day Post-Op ICD-10: Treatment Diagnosis: R13.12 Dysphagia, Oropharyngeal Phase 
R49.1 Aphonia; Loss of voice RECOMMENDATIONS  
SPEAKING VALVE RECOMMENDATIONS: 
? During all waking hours Please ensure cuff is deflated prior to placement Remove if sleeping or if decline in respiratory status DIET:  
? NPO with alternative means of nutrition MEDICATIONS: Non-oral 
  
ASPIRATION PRECAUTIONS · Slow rate of intake · Small bites/sips · Upright at 90 degrees during meal 
  
COMPENSATORY STRATEGIES/MODIFICATIONS · None EDUCATION: 
· Recommendations discussed with Nursing · Family · Patient CONTINUATION OF SKILLED SERVICES/MEDICAL NECESSITY: 
 ? Patient is expected to demonstrate progress in  swallow strength, swallow timeliness, swallow function, diet tolerance and swallow safety in order to  improve swallow safety, work toward diet advancement and decrease aspiration risk. ? Patient is expected to demonstrate progress in expressive communication to decrease assistance required communication, increase independence with activities of daily living and increase communication with family/caregivers. ? Patient continues to require skilled intervention due to aphonia due to trach; dysphagia. RECOMMENDATIONS for CONTINUED SPEECH THERAPY: YES: Anticipate need for ongoing speech therapy during this hospitalization and at next level of care. ASSESSMENT Patient presents with excellent speaking valve tolerance. She tolerated continual wear throughout duration of session with appropriate volume of speech. Respiratory status stable throughout. Speaking valve left in place at end of session. Results/recommendations for wear discussed with RN, RT, and family at end of session. No overt s/sx of airway compromise with ice chips, thin by tsp or cup. High risk of aspiration due to prolonged intubation, trach, and generalized weakness. Recommend continual speaking valve wear during all waking hours - Ensure cuff is deflated prior to pmv placement - Removed valve is increased secretions or decline in respiratory status. - remove if patient is sleeping Plan for modified barium swallow study to objectively assess swallow function prior to initiation of oral diet. Study to be completed 3/4/21. REHABILITATION POTENTIAL FOR STATED GOALS: Good PLAN   
FREQUENCY/DURATION: Continue to follow patient 3 times a week for duration of hospital stay to address above goals. - Recommendations for next treatment session: Next treatment session will address Modified Barium Swallow Study SUBJECTIVE  
 Awake with daughter at bedside. S/p trach and peg. RT transitioned to trach collar for speaking valve trials. History of Present Injury/Illness: Ms. Buzz Becerra  has a past medical history of Acute cystitis without hematuria (1/30/2019), Acute pancreatitis (8/12/2019), CAD (coronary artery disease), Diabetic ketoacidosis ([facility] (3/8/2020), DM2 (diabetes mellitus, type 2) ([facility] Elevated liver function tests (8/8/2019), Gout, Gram-negative bacteremia (8/9/2019), HLD (hyperlipidemia), HTN (hypertension), Peripheral neuropathy, and Right lower quadrant abdominal pain (8/8/2019). . She also  has a past surgical history that includes hx tubal ligation; hx coronary artery bypass graft; pr cardiac surg procedure unlist; hx cholecystectomy; and ir insert non tunl cvc over 5 yrs (1/11/2021). Problem List:  (Impairments causing functional limitations): 1. Aphonia due to trach Previous Dysphagia: NONE REPORTED Diet Prior to Evaluation: PEG 3/2/21 Orientation:  
Person Disoriented to time and situation Pain: Pain Scale 1: Numeric (0 - 10) Pain Intensity 1: 0 OBJECTIVE Speaking Valve: 
Patient with cuffed trach. Secretions minimal.  Finger occlusion performed; appropriate voicing at the phrase level. Based on patient's performance with finger occlusion, passy bill speaking valve trials were performed. PASSY BILL VALVE TRIALS:    
 Heart Rate SPO2 RR Prior to Trial   78 99 28 After cuff deflation - - - After PMV placed 81 96 31 After PMV removed - - - Speaking valve placed without incident. . Patient tolerated placement with no back pressure, change in respiratory effort, or work of breathing for 5, 10, and 20 minute intervals. No back pressure noted. Vocal quality was quiet, but appropriate volume with minimal encouragement to increase volume. Speaking valve was left in place at end of session. Oral Motor:  
· Labial: No impairment · Dentition: Intact · Oral Hygiene: Adequate · Lingual: No impairment Swallow evaluation: 
 Patient consumed trials of ice chips and thin liquids by tsp and cup only. Timely swallow upon palpation, but would require instrumental assessment to fully assess. Voice remained clear after trials. No cough or throat clear noted. No apparent s/sx of airway compromise with water; however, high risk of aspiration given overall medical status/medical complexity. INTERDISCIPLINARY COLLABORATION: Registered Nurse and Respiratory Therapist 
PRECAUTIONS/ALLERGIES: Sulfa (sulfonamide antibiotics) Tool Used: Dysphagia Outcome and Severity Scale (ELKE) Score Comments Normal Diet  [] 7 With no strategies or extra time needed Functional Swallow  [] 6 May have mild oral or pharyngeal delay Mild Dysphagia  [] 5 Which may require one diet consistency restricted Mild-Moderate Dysphagia  [] 4 With 1-2 diet consistencies restricted Moderate Dysphagia  [] 3 With 2 or more diet consistencies restricted Moderate-Severe Dysphagia  [] 2 With partial PO strategies (trials with ST only) Severe Dysphagia  [] 1 With inability to tolerate any PO safely Score:  Initial: 2 Most Recent: 2 (Date 03/03/21 ) Interpretation of Tool: The Dysphagia Outcome and Severity Scale (ELKE) is a simple, easy-to-use, 7-point scale developed to systematically rate the functional severity of dysphagia based on objective assessment and make recommendations for diet level, independence level, and type of nutrition. Tool Used: MODIFIED YESSY SCALE (mRS) Score No Symptoms  [] 0 No significant disability despite symptoms; able to carry out all usual duties and activities  [] 1 Slight disability; unable to carry out all previous activities but able to look after own affairs without assistance. [] 2 Moderate disability; requiring some help but able to walk without assistance  [] 3  
 Moderately severe disability; unable to walk without assistance and unable to attend to own bodily needs without assistance  [] 4 Severe disability; bedridden, incontinent, and requiring constant nursing care and attention  [] 5 Score:  Initial: 4 Interpretation of Tool: The Modified [place] Scale is a 7-point scaled used to quantify level of disability as it relates to a patient's functional abilities. Current Medications: No current facility-administered medications on file prior to encounter. Current Outpatient Medications on File Prior to Encounter Medication Sig Dispense Refill  atorvastatin (LIPITOR) 40 mg tablet Take 40 mg by mouth.  aspirin delayed-release 81 mg tablet Take  by mouth daily.  metoprolol tartrate (LOPRESSOR) 25 mg tablet Take 0.5 Tabs by mouth every twelve (12) hours. 60 Tab 1  
 insulin lispro (HUMALOG) 100 unit/mL injection 15 Units by SubCUTAneous route Before breakfast, lunch, and dinner. 1 Vial 0  
 Insulin Needles, Disposable, 31 gauge x 5/16\" ndle by SubCUTAneous route Before breakfast, lunch, dinner and at bedtime. 1 Package 11  
 clopidogrel (PLAVIX) 75 mg tab Take 75 mg by mouth.  insulin glargine (LANTUS) 100 unit/mL injection 50 Units by SubCUTAneous route nightly. 1 Vial 0  Blood-Glucose Meter (ONETOUCH ULTRAMINI) monitoring kit Use as directed 1 Kit 0  
 glucose blood VI test strips (ONETOUCH ULTRA TEST) strip Test 4 times daily as directed 100 Strip 11  Lancets (ONETOUCH ULTRASOFT LANCETS) misc Test 4 times daily as directed 100 Each 11  
 losartan (COZAAR) 100 mg tablet Take  by mouth daily.  rosuvastatin (CRESTOR) 20 mg tablet Take 1 Tab by mouth nightly. 90 Tab 1  
 gabapentin (NEURONTIN) 100 mg capsule Take 1 Cap by mouth three (3) times daily. (Patient taking differently: Take 100 mg by mouth two (2) times a day.) 30 Cap 5  
 rOPINIRole (REQUIP) 0.5 mg tablet Take 1 Tab by mouth nightly as needed.  90 Tab 1  
 
 
 SAFETY: 
After treatment position/precautions: · Upright in bed · RN and RT notified · Family at bedside Total Treatment Duration:  
Time In: 3529 Time Out: 134[address] CCC-SLP

## 2021-03-04 NOTE — PROGRESS NOTES
TF stopped due to patient eating and drinking 75% of dinner AM RN to follow up with nutrition as to resume or discontinue TF

## 2021-03-04 NOTE — PROGRESS NOTES
No acute events overnight, patient had some difficulty sleeping and was given 1x dose remeron with + effect

## 2021-03-04 NOTE — PROGRESS NOTES
Care Daily Progress Note: 3/4/2021 Admission Date: 12/24/2020 The patient's chart is reviewed and the patient is discussed with the staff. 79 yo female admitted 12/24 with acute respiratory failure secondary to COVID 19.  Pt was treated with Decadron and convalescent plasma. She had PRATIMA and was not treated with Remdesivir.  She was initially on BiPAP but pt had a  cardiac arrest on 12/30 requiring intubation.  She required prolonged vent support so a trach was placed on 1/19. Pt has been treated for an E coli UTI as well as MRSA in her sputum. She developed acute on chronic renal failure and started on dialysis on 1/12 but now off of since 1/25 and there are no plans to restart. Albert Burnham consulted for PEG but this on hold pending family discussion regarding need for long term care. Pt was seen by Neurology with an abnormal EEG with moderate to severe slowing. Brain MRI 1/26 revealed stable age-related senescent changes and chronic microvascular disease with remote right posterior temporal occipital ischemic insult. No evidence anoxic brain injury per neuro - deep encephalopathy. Have gradually been able to reduce sedatives. Now tolerating T collar trials and felt to be able to follow some commands. PEG placed 3/2. More alert and ST evaluating for po diet. Off vent since 3/3. Subjective:  
   Stayed off vent all night. Wearing PMV and conversant. Knows where she is. Asking for something to eat/drink. Current Facility-Administered Medications Medication Dose Route Frequency  risperiDONE (RisperDAL) 1 mg/mL oral solution soln 0.5 mg  0.5 mg Nasogastric QHS  diclofenac (VOLTAREN) 1 % topical gel 2 g  2 g Topical Q6H PRN  
 oxyCODONE IR (ROXICODONE) tablet 5 mg  5 mg Per NG tube Q8H PRN  
 [Held by provider] insulin glargine (LANTUS) injection 20 Units  20 Units SubCUTAneous DAILY  loperamide (IMODIUM) 1 mg/7.5 mL oral solution 2 mg  2 mg Per NG tube QID PRN  
  pantoprazole (PROTONIX) 40 mg in 0.9% sodium chloride 10 mL injection  40 mg IntraVENous DAILY  Saccharomyces boulardii (FLORASTOR) capsule 250 mg  250 mg Per NG tube BID  
 guaiFENesin (ROBITUSSIN) 100 mg/5 mL oral liquid 100 mg  100 mg Per NG tube Q4H PRN  
 insulin regular (NOVOLIN R, HUMULIN R) injection   SubCUTAneous Q6H  
 albuterol (PROVENTIL VENTOLIN) nebulizer solution 2.5 mg  2.5 mg Nebulization Q6H RT  
 hydrALAZINE (APRESOLINE) 20 mg/mL injection 10 mg  10 mg IntraVENous Q6H PRN  
 docusate (COLACE) 50 mg/5 mL oral liquid 100 mg  100 mg Per NG tube DAILY PRN  
 heparin (porcine) 1,000 unit/mL injection 5,000 Units  5,000 Units IntraVENous DIALYSIS PRN  
 alcohol 62% (NOZIN) nasal  1 Ampule  1 Ampule Topical Q12H  
 heparin (porcine) injection 5,000 Units  5,000 Units SubCUTAneous Q8H  
 bisacodyL (DULCOLAX) suppository 10 mg  10 mg Rectal DAILY PRN  
 NUTRITIONAL SUPPORT ELECTROLYTE PRN ORDERS   Does Not Apply PRN  
 dextrose 40% (GLUTOSE) oral gel 1 Tube  15 g Oral PRN  
 glucagon (GLUCAGEN) injection 1 mg  1 mg IntraMUSCular PRN  
 dextrose (D50W) injection syrg 12.5-25 g  25-50 mL IntraVENous PRN  
 albuterol (PROVENTIL HFA, VENTOLIN HFA, PROAIR HFA) inhaler 2 Puff  2 Puff Inhalation Q4H PRN  phenol throat spray (CHLORASEPTIC) 1 Spray  1 Spray Oral PRN  
 lip protectant (BLISTEX) ointment 1 Each  1 Each Topical PRN  
 acetaminophen (TYLENOL) tablet 650 mg  650 mg Oral Q6H PRN  
 sodium chloride (NS) flush 5-40 mL  5-40 mL IntraVENous Q8H  
 sodium chloride (NS) flush 5-40 mL  5-40 mL IntraVENous PRN Objective:  
 
Vitals:  
 03/04/21 0500 03/04/21 0600 03/04/21 0701 03/04/21 8521 BP: (!) 144/78 138/76 (!) 154/75 Pulse: 84 84 84 84 Resp: 18 18 16 18 Temp:   97.9 °F (36.6 °C) SpO2: 100% 100% 100% 100% Weight: 140 lb 3.2 oz (63.6 kg) Height:      
 
 
Intake and Output:  
03/02 1901 - 03/04 0700 In: 8130 Out: 880 [Urine:880] No intake/output data recorded. Physical Exam:         
Constitutional:  Awake and alert. Wearing PMV and voice fairly strong. Conversation appropriate. HEENT:  Sclera clear, pupils equal, oral mucosa dry. RESPIRATORY:  Clear bilaterally from anterior - crackles from posterior - right > left. Oxygen via t collar - 35%. Wearing PMV CARDIOVASCULAR:  RRR with no M,G,R; sinus per telemetry ABDOMEN/GI:  soft and with no tenderness; positive bowel sounds present EXTREMITIES:  warm with no cyanosis, no lower leg edema SKIN:  no jaundice or ecchymosis NEURO: awake and alert. Seems appropriate MUSCULOSKELETAL: moves all four extremities. No deformities ROS: Did not sleep well No dyspnea Denies pain Generalized weakness Thirsty/hungry LINES:  PEG, Trach, peripheral IV 
 
DRIPS:  TF 
 
CHEST XRAY:  None today LAB Recent Labs 03/04/21 
2264 03/02/21 
6565 WBC 12.3* 9.8 HGB 9.9* 8.9* HCT 30.5* 27.6*  
 227 Recent Labs 03/04/21 
7208 03/02/21 
6252  141  
K 3.4* 3.5  101 CO2 36* 36* * 114* BUN 69* 68* CREA 1.18* 1.19* CA 9.5 10.0 Assessment:  (Medical Decision Making) Hospital Problems  Date Reviewed: 2/5/2021 Codes Class Noted POA Encephalopathy ICD-10-CM: G93.40 ICD-9-CM: 348.30  2/7/2021 Unknown Resolved. Did not sleep well - still on risperdal  
 Cardiac arrest [facility]) ICD-10-CM: I46.9 ICD-9-CM: 427.5  12/30/2020 No  
   
 COVID-19 ICD-10-CM: U07.1 ICD-9-CM: 079.89  12/24/2020 Yes Now off isolation * (Principal) Acute respiratory distress syndrome (ARDS) due to severe acute respiratory syndrome coronavirus 2 (SARS-CoV-2) ([facility]) ICD-10-CM: U07.1, J80 
ICD-9-CM: 518.82, 079.89  12/24/2020 No  
 Off vent X 24 hours Acute hypoxemic respiratory failure ([facility] [address] ICD-10-CM: J96.01 
ICD-9-CM: 518.81  12/24/2020 Yes As above - 35% Acute on chronic renal failure (HCC) ICD-10-CM: N17.9, N18.9 ICD-9-CM: 584.9, 585.9  9/17/2019 Yes  
 resolved Coronary artery disease involving coronary bypass graft of native heart without angina pectoris (Chronic) ICD-10-CM: D79.471 ICD-9-CM: 414.05  6/15/2015 Yes Noted. hx of stents 2014. On plavix at home - off of here Uncontrolled type 2 diabetes mellitus with hyperglycemia (HCC) (Chronic) ICD-10-CM: E11.65 ICD-9-CM: 250.02  6/15/2015 Yes BS mid 100s/low 200s Diabetes mellitus with hyperosmolarity without hyperglycemic hyperosmolar nonketotic coma (HCC) (Chronic) ICD-10-CM: E11.00 ICD-9-CM: 250.20  11/24/2014 Yes As above Plan:  (Medical Decision Making) 1. Will stop Risperdal and use prn Trazodone for sleep 2. BNP up to 3096 on Monday. Held lasix yesterday with TF on hold but now restarted and fluid balance + 1 liter. Will restart lasix at 40 mg per day and monitor labs/blood pressure 3. Off vent for 24 hours. Tolerating PMV with low flow oxygen. Monitor for another 24 hours off vent and plan to downsize trach tomorrow if stable. Work towards decannulation. 4. Now with PEG but seen by ST yesterday and plans for MBS today. TF for now 5. Have contacted [facility] again about bed. Patient is now more stable and transfer there now seems more appropriate. PT seeing. 6. DNR - previous plan was to not start any new antibiotics for transfuse. May need to revisit this now that she has improved. Will discuss with daughter, [facility]. 7. Central line/coyle out 8. IV Protonix -  Change to enteral.  Low dose heparin restarted for DVT prophylaxis - mild gastritis noted with EGD. Hemoglobin improved. 9. Restart lantus 10. Blood pressure better - home meds reviewed - was on Cozaar and Lopressor. Will restart today. 11. Updated Shahab Beverly by phone 15. Was on Plavix at home with history of stents in 2014. Continue to hold with gastritis per EGD and recent anemia. ? Need to continue with date of stents 2014. Will discuss with cardiology - she is followed by [facility] Erick Schmid NP Lungs: clear Heart:  RRR with no Murmur/Rubs/Gallops Additional Comments:  Off vent 24 hours -leave in icu for now since she has had prolonged vent I have spoken with and examined the patient. I agree with the above assessment and plan as documented. Flor Camarena MD 
 
 
More than 50% of time documented was spent face-to-face contact with the patient and in the care of the patient on the floor/unit where the patient is located. I have spoken with and examined the patient. I agree with the above assessment and plan as documented.

## 2021-03-04 NOTE — PROGRESS NOTES
STG: Patient will tolerate continua speaking valve wear without respiratory decline. STG: Patient will demonstrate independence with doning and doffing speaking valve. LTG: Patient will tolerate passy bill speaking valve to communicate wants and needs without respiratory decline,. STG: Patient will participate in dysphagia assessment to determine appropriateness and safety with po intake LTG: Patient will tolerate least restrictive diet without overt signs or symptoms of airway compromise. STG: Patient will participate in modified barium swallow study to objectively assess swallow function. MET 3/4/21 STG: Patient will consume mechanical soft diet and thin liquids without overt signs or symptoms of respiratory compromise. ADDED 3/4/21 SPEECH LANGUAGE PATHOLOGY: MODIFIED BARIUM SWALLOW STUDY Initial Assessment NAME/AGE/GENDER: Corie Jo is a 78 y.o. female DATE: 3/4/2021 PRIMARY DIAGNOSIS: Acute respiratory distress syndrome (ARDS) due to severe acute respiratory syndrome coronavirus 2 (SARS-CoV-2) ([facility] [U07.1, J80] COVID-19 [U07.1] Procedure(s) (LRB): PERCUTANEOUS ENDOSCOPIC GASTROSTOMY TUBE INSERTION/ 25/ 329 (N/A) ESOPHAGOGASTRODUODENOSCOPY (EGD) (N/A) 2 Days Post-Op ICD-10: Treatment Diagnosis: Oropharyngeal dysphagia (R13.12) INTERDISCIPLINARY COLLABORATION: Radiologist, Dr. Shavon Delgadillo PRECAUTIONS/ALLERGIES: Sulfa (sulfonamide antibiotics) RECOMMENDATIONS/PLAN  
DIET:  
? PO diet texture:  Mechanical soft ? Liquids:  regular thin MEDICATIONS: With liquid COMPENSATORY STRATEGIES/MODIFICATIONS INCLUDING: 
· Fully awake/alert · Upright for all PO 
· Supervision with all PO 
· Small bites and sips · Remain upright for 20-30 min after any PO · Slow rate of PO intake OTHER RECOMMENDATIONS (including follow up treatment recommendations): · Treatment to improve/facilitate oral/pharyngeal skills · Training in use of compensatory safe swallowing strategies/feeding guidelines · Patient/family education RECOMMENDATIONS for CONTINUED SPEECH THERAPY:  
YES: Anticipate need for ongoing speech therapy during this hospitalization. FREQUENCY/DURATION: Continue to follow patient 3 times a week for duration of hospital stay to address above goals. Recommendations for next treatment session: Next treatment session will address diet tolerance ASSESSMENT Ms. Maribel Riggins presents with no laryngeal penetration or aspiration with any texture assessed. Mild mastication delay and piecemeal deglutition with mixed consistency. Patient holding cracker between teeth and it was removed unmasticated. Recommend mechanical soft textures and thin liquids. Give meds whole with water. SUBJECTIVE History of Present Injury/Illness: Ms. Maribel Riggins  has a past medical history of Acute cystitis without hematuria (1/30/2019), Acute pancreatitis (8/12/2019), CAD (coronary artery disease), Diabetic ketoacidosis ([facility] [address] (3/8/2020), DM2 (diabetes mellitus, type 2) ([facility] [address] Elevated liver function tests (8/8/2019), Gout, Gram-negative bacteremia (8/9/2019), HLD (hyperlipidemia), HTN (hypertension), Peripheral neuropathy, and Right lower quadrant abdominal pain (8/8/2019). . She also  has a past surgical history that includes hx tubal ligation; hx coronary artery bypass graft; pr cardiac surg procedure unlist; hx cholecystectomy; and ir insert non tunl cvc over 5 yrs (1/11/2021). Pain: 
Pain Intensity 1: 0 Pain Location 1: Throat Pain Intervention(s) 1: Repositioned, Rest 
 
Current Medications: No current facility-administered medications on file prior to encounter. Current Outpatient Medications on File Prior to Encounter Medication Sig Dispense Refill  atorvastatin (LIPITOR) 40 mg tablet Take 40 mg by mouth.  aspirin delayed-release 81 mg tablet Take  by mouth daily.  metoprolol tartrate (LOPRESSOR) 25 mg tablet Take 0.5 Tabs by mouth every twelve (12) hours. 60 Tab 1  
 insulin lispro (HUMALOG) 100 unit/mL injection 15 Units by SubCUTAneous route Before breakfast, lunch, and dinner. 1 Vial 0  
 Insulin Needles, Disposable, 31 gauge x 5/16\" ndle by SubCUTAneous route Before breakfast, lunch, dinner and at bedtime. 1 Package 11  
 clopidogrel (PLAVIX) 75 mg tab Take 75 mg by mouth.  insulin glargine (LANTUS) 100 unit/mL injection 50 Units by SubCUTAneous route nightly. 1 Vial 0  Blood-Glucose Meter (ONETOUCH ULTRAMINI) monitoring kit Use as directed 1 Kit 0  
 glucose blood VI test strips (ONETOUCH ULTRA TEST) strip Test 4 times daily as directed 100 Strip 11  Lancets (ONETOUCH ULTRASOFT LANCETS) misc Test 4 times daily as directed 100 Each 11  
 losartan (COZAAR) 100 mg tablet Take  by mouth daily.  rosuvastatin (CRESTOR) 20 mg tablet Take 1 Tab by mouth nightly. 90 Tab 1  
 gabapentin (NEURONTIN) 100 mg capsule Take 1 Cap by mouth three (3) times daily. (Patient taking differently: Take 100 mg by mouth two (2) times a day.) 30 Cap 5  
 rOPINIRole (REQUIP) 0.5 mg tablet Take 1 Tab by mouth nightly as needed. 90 Tab 1 OBJECTIVE Orientation:  
? Person Oral Assessment:  Labial: Slow rate and incoordination Dentition: Natural 
Oral Hygiene: Adequate Lingual: Decreased rate and Impaired coordination Vocal Quality: Low volume and PMV in place Modified barium swallow study was performed in the radiology suite with Ms. Taylor in the lateral plane upright 90° in bed. To evaluate her swallow function, barium coated liquid and food was administered in the form of thin liquids (by spoon, cup sip, cup gulp, straw sip and serial swallows), pudding, mixed consistency and cracker. Oral phase of swallow:  
? prolonged mastication ? piecemeal deglutition Pharyngeal phase of swallow: ? Swallows of thin liquids were timely. No laryngeal penetration or aspiration was observed. No pharyngeal residue after swallow. ? Swallows of pudding were timely. No laryngeal penetration or aspiration was observed. No pharyngeal residue after swallow. ? Swallows of mixed consistencies were delayed and piecemealed. No laryngeal penetration or aspiration was observed. No pharyngeal residue after swallow. ? Swallows of cracker were absent. . 
Pharyngeal characteristics: 
? functional pharyngeal swallow Attempted strategies:  
? none Effective strategies:  
? not applicable Aspiration/Penetration Scale: 1 (No penetration/aspiration. Contrast does not enter the airway.) Cervical esophageal phase of swallow:  
? adequate and timely clearance of all boluses through cervical esophagus **Distal esophagus not assessed due to limitations of MBS study. Assessment/Reassessment only, no treatment provided today. Tool Used: Dysphagia Outcome and Severity Scale (ELKE) Comments Normal Diet With no strategies or extra time needed Functional Swallow May have mild oral or pharyngeal delay Mild Dysphagia Which may require one diet consistency restricted Mild-Moderate Dysphagia With 1-2 diet consistencies restricted Moderate Dysphagia With 2 or more diet consistencies restricted Moderately Severe Dysphagia With partial PO strategies (trials with ST only) Severe Dysphagia With inability to tolerate any PO safely Score:  Initial: 2 Most Recent: 5 (Date:3/4/2021) Interpretation of Tool: The Dysphagia Outcome and Severity Scale (ELKE) is a simple, easy-to-use, 7-point scale developed to systematically rate the functional severity of dysphagia based on objective assessment and make recommendations for diet level, independence level, and type of nutrition.   
 
Payor: Rach Armenta / Plan: [address] / Product Type: Managed Care Medicare /  
 
 Education: · Recommendations discussed with patient, RN and MD. 
 
Safety: After treatment position/precautions: 
· RN present and transporting patient back to room. Total Treatment Duration: 
Time In: 1100 Time Out: 1130 Jimena Hinton MA, CCC-SLP

## 2021-03-04 NOTE — PROGRESS NOTES
Spiritual Care Visit, initial visit. Patient is reported to be improving and has been talking some with Trach plug. Visited with patient at bedside. Prayed for patient's healing and health. Visit by Fabio Lam, Staff .  Pelon., Th.B., B.A.

## 2021-03-04 NOTE — PROGRESS NOTES
Bedside, Verbal and Written shift change report given to NANCY RN  (oncoming nurse) by Selina King RN  (offgoing nurse). Report included the following information SBAR, Kardex, ED Summary, Procedure Summary, Intake/Output, MAR, Recent Results, Med Rec Status, Cardiac Rhythm NSR, Alarm Parameters  and Quality Measures PT REPOSITIONED  
 
NO ACUTE EVENTS OVER LAST NIGHT  
 
PT REMAINS ON TRACH COLLAR

## 2021-03-04 NOTE — PROGRESS NOTES
Physical therapy note: Attempted PT this AM. Pt going for a swallowing test.  Will continue to treat as pt is able and time/schedule permit.  
 
Silvia Carpenter, PTA

## 2021-03-05 NOTE — PROGRESS NOTES
Bedside and Verbal shift change report given to Carmen Matos RN (oncoming nurse) by Jos Ponce RN (offgoing nurse). Report included the following information SBAR, Kardex, Intake/Output, MAR, Accordion, Recent Results, Med Rec Status, Cardiac Rhythm NSR and Alarm Parameters . Patient's temp a little low at 95.5 and started on warming blanket at 0430.

## 2021-03-05 NOTE — PROGRESS NOTES
ACUTE PHYSICAL THERAPY GOALS: 
(Developed with and agreed upon by patient and/or caregiver.) 1. Ms. Nilsa Story will perform rolling left and right with mod assist of 1 in 7 days. 2.  Ms. Nilsa Story will perform supine to sit with mod assist of 1 in 7 days. 3.  Ms. Nilsa Story will perform active range of motion bilateral lower extremities x 20 reps in supine and sitting in 7 days. 4.  Ms. Nilsa Story will perform sitting edge of bed unsupported and performing dynamic activity x >15 minutes. 5.  Ms. Nilsa Story will perform bed to chair with mod assist in 7 days. PHYSICAL THERAPY: Daily Note and AM Treatment Day # 3 Robert Anthony is a 78 y.o. female PRIMARY DIAGNOSIS: Acute respiratory distress syndrome (ARDS) due to severe acute respiratory syndrome coronavirus 2 (SARS-CoV-2) ([facility]) Acute respiratory distress syndrome (ARDS) due to severe acute respiratory syndrome coronavirus 2 (SARS-CoV-2) ([facility] [U07.1, J80] COVID-19 [U07.1] Procedure(s) (LRB): PERCUTANEOUS ENDOSCOPIC GASTROSTOMY TUBE INSERTION/ 25/ 329 (N/A) ESOPHAGOGASTRODUODENOSCOPY (EGD) (N/A) 3 Days Post-Op ASSESSMENT:  
 
REHAB RECOMMENDATIONS: CURRENT LEVEL OF FUNCTION: 
(Most Recently Demonstrated) Recommendation to date pending progress: 
Setting: 
? Short-term Rehab Equipment: ? To Be Determined Bed Mobility: 
? Maximal Assistance x 2 Sit to Stand: 
? Not tested Transfers: 
? Not tested Gait/Mobility: 
? Not tested ASSESSMENT: 
Ms. Nilsa Story presents sidelying in bed upon entering room. She worked oon bed mobility and was found to be soiled. Pt rolled sit to side and was cleaned while rolling. She required mod to max a x 2 for bed mobility. Pt would continue to benefit from co-treatments with OT as she is medically complex. Pt would benefit from attempted stand at next visit. SUBJECTIVE:  
Ms. Nilsa Story answering some questions and smiles. SOCIAL HISTORY/ LIVING ENVIRONMENT: Pt was previously living with her daughter in a trailer and independent with all ADLs. OBJECTIVE:  
 
PAIN: VITAL SIGNS: LINES/DRAINS:  
Pre Treatment:   
Post Treatment: 0   Purewick O2 Device: Tracheal collar MOBILITY: I Mod I S SBA CGA Min Mod Max Total  NT x2 Comments:  
Bed Mobility Rolling [] [] [] [] [] [] [] [x] [] [] [x] Supine to Sit [] [] [] [] [] [] [] [] [] [x] [] Scooting [] [] [] [] [] [] [] [] [] [x] [] Sit to Supine [] [] [] [] [] [] [] [] [] [x] [] Transfers Sit to Stand [] [] [] [] [] [] [] [] [] [x] [] Bed to Chair [] [] [] [] [] [] [] [] [] [x] [] Stand to Sit [] [] [] [] [] [] [] [] [] [x] [] I=Independent, Mod I=Modified Independent, S=Supervision, SBA=Standby Assistance, CGA=Contact Guard Assistance,  
Min=Minimal Assistance, Mod=Moderate Assistance, Max=Maximal Assistance, Total=Total Assistance, NT=Not Tested BALANCE: Good Fair+ Fair Fair- Poor NT Comments Sitting Static [] [] [] [] [] [x] Sitting Dynamic [] [] [] [] [] [x] Standing Static [] [] [] [] [] [x] Standing Dynamic [] [] [] [] [] [x] GAIT: I Mod I S SBA CGA Min Mod Max Total  NT x2 Comments:  
Level of Assistance [] [] [] [] [] [] [] [] [] [x] [] Distance NA   
DME None Gait Quality NA Weightbearing Status N/A I=Independent, Mod I=Modified Independent, S=Supervision, SBA=Standby Assistance, CGA=Contact Guard Assistance,  
Min=Minimal Assistance, Mod=Moderate Assistance, Max=Maximal Assistance, Total=Total Assistance, NT=Not Tested PLAN:  
FREQUENCY/DURATION: PT Plan of Care: 3 times/week for duration of hospital stay or until stated goals are met, whichever comes first. 
TREATMENT:  
 
TREATMENT:  
($$ Therapeutic Activity: 38-52 mins    ) Co-Treatment PT/OT necessary due to patient's decreased overall endurance/tolerance levels, as well as need for high level skilled assistance to complete functional transfers/mobility and functional tasks Therapeutic Activity (38 Minutes): Therapeutic activity included Rolling to improve functional Mobility, Strength and Activity tolerance. TREATMENT GRID: 
N/A 
 
AFTER TREATMENT POSITION/PRECAUTIONS: 
Bed, Needs within reach, RN notified and Visitors at bedside INTERDISCIPLINARY COLLABORATION: 
RN/PCT, PT/PTA and OT/PERALTA TOTAL TREATMENT DURATION: 
PT Patient Time In/Time Out Time In: 9994 Time Out: 1018 Asuncion Grullon PTA

## 2021-03-05 NOTE — PROGRESS NOTES
Care Daily Progress Note: 3/5/2021 Admission Date: 12/24/2020 The patient's chart is reviewed and the patient is discussed with the staff. 79 yo female admitted 12/24 with acute respiratory failure secondary to COVID 19.  Pt was treated with Decadron and convalescent plasma. She had PRATIMA and was not treated with Remdesivir.  She was initially on BiPAP but pt had a  cardiac arrest on 12/30 requiring intubation.  She required prolonged vent support so a trach was placed on 1/19. Pt has been treated for an E coli UTI as well as MRSA in her sputum. She developed acute on chronic renal failure and started on dialysis on 1/12 but now off of since 1/25 and there are no plans to restart. Huey Smith consulted for PEG but this on hold pending family discussion regarding need for long term care. Pt was seen by Neurology with an abnormal EEG with moderate to severe slowing. Brain MRI 1/26 revealed stable age-related senescent changes and chronic microvascular disease with remote right posterior temporal occipital ischemic insult. No evidence anoxic brain injury per neuro - deep encephalopathy. Have gradually been able to reduce sedatives. Now tolerating T collar trials and felt to be able to follow some commands. PEG placed 3/2. Now on mechanical soft diet with thin liquids. Off vent since 3/3. Has been approved for [facility] - will probably move there the week of 3/8. Subjective:  
   Stayed off vent again last night. Wearing PMV and conversant. Daughter at bedside. Working with PT. On PO diet now. Current Facility-Administered Medications Medication Dose Route Frequency  lansoprazole (PREVACID) 3 mg/mL oral suspension 30 mg  30 mg Per G Tube ACB  traZODone (DESYREL) tablet 50 mg  50 mg Oral QHS PRN  
 furosemide (LASIX) tablet 40 mg  40 mg Per G Tube DAILY  losartan (COZAAR) tablet 100 mg  100 mg Per G Tube DAILY  metoprolol tartrate (LOPRESSOR) tablet 25 mg  25 mg Per G Tube Q12H  rosuvastatin (CRESTOR) tablet 20 mg  20 mg Per G Tube QHS  diclofenac (VOLTAREN) 1 % topical gel 2 g  2 g Topical Q6H PRN  
 oxyCODONE IR (ROXICODONE) tablet 5 mg  5 mg Per NG tube Q8H PRN  
 insulin glargine (LANTUS) injection 20 Units  20 Units SubCUTAneous DAILY  loperamide (IMODIUM) 1 mg/7.5 mL oral solution 2 mg  2 mg Per NG tube QID PRN  
 Saccharomyces boulardii (FLORASTOR) capsule 250 mg  250 mg Per NG tube BID  insulin regular (NOVOLIN R, HUMULIN R) injection   SubCUTAneous Q6H  
 albuterol (PROVENTIL VENTOLIN) nebulizer solution 2.5 mg  2.5 mg Nebulization Q6H RT  
 hydrALAZINE (APRESOLINE) 20 mg/mL injection 10 mg  10 mg IntraVENous Q6H PRN  
 docusate (COLACE) 50 mg/5 mL oral liquid 100 mg  100 mg Per NG tube DAILY PRN  
 heparin (porcine) 1,000 unit/mL injection 5,000 Units  5,000 Units IntraVENous DIALYSIS PRN  
 alcohol 62% (NOZIN) nasal  1 Ampule  1 Ampule Topical Q12H  
 heparin (porcine) injection 5,000 Units  5,000 Units SubCUTAneous Q8H  
 bisacodyL (DULCOLAX) suppository 10 mg  10 mg Rectal DAILY PRN  
 NUTRITIONAL SUPPORT ELECTROLYTE PRN ORDERS   Does Not Apply PRN  
 dextrose 40% (GLUTOSE) oral gel 1 Tube  15 g Oral PRN  
 glucagon (GLUCAGEN) injection 1 mg  1 mg IntraMUSCular PRN  
 dextrose (D50W) injection syrg 12.5-25 g  25-50 mL IntraVENous PRN  phenol throat spray (CHLORASEPTIC) 1 Spray  1 Spray Oral PRN  
 lip protectant (BLISTEX) ointment 1 Each  1 Each Topical PRN  
 acetaminophen (TYLENOL) tablet 650 mg  650 mg Oral Q6H PRN  
 sodium chloride (NS) flush 5-40 mL  5-40 mL IntraVENous Q8H  
 sodium chloride (NS) flush 5-40 mL  5-40 mL IntraVENous PRN Objective:  
 
Vitals:  
 03/05/21 4713 03/05/21 2836 03/05/21 5310 03/05/21 0831 BP: (!) 153/78 (!) 166/83  (!) 154/74 Pulse: 76 80  78 Resp: 29 23  21 Temp:      
SpO2: 100% 100% 100% 96% Weight:      
Height:      
 
 
Intake and Output:  
03/03 1901 - 03/05 0700 In: 3391 [P.O.:150] Out: 1000 [Urine:1000] No intake/output data recorded. Physical Exam:         
Constitutional:  Awake and alert. Wearing PMV and voice fairly strong. Conversation appropriate. HEENT:  Sclera clear, pupils equal, oral mucosa moist.   
RESPIRATORY:  Clear bilaterally from anterior - crackles from posterior bilaterally. Oxygen via t collar - 35%. Wearing PMV CARDIOVASCULAR:  RRR with no M,G,R; sinus per telemetry ABDOMEN/GI:  soft and with no tenderness; positive bowel sounds present EXTREMITIES:  warm with no cyanosis, no lower leg edema SKIN:  no jaundice or ecchymosis NEURO: awake and alert. Seems appropriate MUSCULOSKELETAL: moves all four extremities. No deformities ROS: Did not sleep well per nursing Some dyspnea noted while working with PT Denies pain Generalized weakness Good appetite LINES:  PEG, Trach, peripheral IV 
 
DRIPS:  None CHEST XRAY:  None today LAB Recent Labs 03/05/21 
4189 03/04/21 
8151 WBC 13.1* 12.3* HGB 9.9* 9.9*  
HCT 30.5* 30.5*  238 Recent Labs 03/05/21 
5159 03/04/21 
0032  144  
K 3.7 3.4*  
 105 CO2 34* 36* * 166* BUN 65* 69* CREA 1.19* 1.18* CA 9.7 9.5 Assessment:  (Medical Decision Making) Hospital Problems  Date Reviewed: 2/5/2021 Codes Class Noted POA Encephalopathy ICD-10-CM: G93.40 ICD-9-CM: 348.30  2/7/2021 Unknown Resolved. Did not sleep well - off Risperdal - has Trazodone ordered prn but was not used Cardiac arrest [facility]) ICD-10-CM: I46.9 ICD-9-CM: 427.5  12/30/2020 No  
   
 COVID-19 ICD-10-CM: U07.1 ICD-9-CM: 079.89  12/24/2020 Yes Now off isolation * (Principal) Acute respiratory distress syndrome (ARDS) due to severe acute respiratory syndrome coronavirus 2 (SARS-CoV-2) ([facility]) ICD-10-CM: U07.1, J80 
ICD-9-CM: 518.82, 079.89  12/24/2020 No  
 Off vent X 48 hours - low flow oxygen Acute hypoxemic respiratory failure ([facility] [address] ICD-10-CM: J96.01 
ICD-9-CM: 518.81  12/24/2020 Yes As above - 35% Acute on chronic renal failure (HCC) ICD-10-CM: N17.9, N18.9 ICD-9-CM: 584.9, 585.9  9/17/2019 Yes  
 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 hx of stents 2014. On plavix at home - off of here Uncontrolled type 2 diabetes mellitus with hyperglycemia (HCC) (Chronic) ICD-10-CM: E11.65 ICD-9-CM: 250.02  6/15/2015 Yes BS low to mid 100s Diabetes mellitus with hyperosmolarity without hyperglycemic hyperosmolar nonketotic coma (HCC) (Chronic) ICD-10-CM: E11.00 ICD-9-CM: 250.20  11/24/2014 Yes As above Plan:  (Medical Decision Making) 1. Off Risperdal - has Trazodone ordered prn for sleep but not used last night. Discussed with nurse 2. BNP up to 3096 on Monday. Held lasix mid week with TF on hold but now restarted and fluid balance + 221 over past 24 hours. Lasix restarted at 40 mg per day yesterday. Repeat labs on Monday. 3. Off vent for 48 hours. Tolerating PMV with low flow oxygen (35%). Downsize trach today and work towards capping/decannulation. 4. Now with PEG but seen by ST and now on mechanical soft diet with thin liquids. Ate about 75% of meal last night. Off TF. 5. Has been approved by [facility] but won't have a bed available til next week. Transfer to the floor for now. PT seeing. 6. DNR - previous plan was to not start any new antibiotics for transfuse but rediscussed with the daughter, Kevin Howe, yesterday. Will leave her a DNR but otherwise provide aggressive care since she is doing so much better. 7. Central line/coyle out 8. Protonix/Low dose heparin for DVT prophylaxis - mild gastritis noted with EGD. Hemoglobin improved. 9. On lantus with BS in the low to mid 100s.  Change [facility] to [facility] and HS 
 10. Noted hypertension - Cozaar and Lopressor restarted 3/4 (home meds). Continue to monitor. 6. Was on Plavix at home with history of stents in 2014. Followed by [facility] (not [place]). Have been holding here with acute illness and noted gastritis per EGD/anemia. Will go ahead and restart now that she is stable. Repeat CBC on Monday Shorty Batres NP Lungs: clear Heart:  RRR with no Murmur/Rubs/Gallops Additional Comments:  Off vent 2 days, downsize trach, move to floor, to [place] next week I have spoken with and examined the patient. I agree with the above assessment and plan as documented. Marilyn Dean MD 
 
 
More than 50% of time documented was spent face-to-face contact with the patient and in the care of the patient on the floor/unit where the patient is located. I have spoken with and examined the patient. I agree with the above assessment and plan as documented.

## 2021-03-05 NOTE — PROGRESS NOTES
ACUTE OT GOALS: 
(Developed with and agreed upon by patient and/or caregiver.) 1. Patient will complete lower body bathing and dressing with Mod A and adaptive equipment as needed. 2. Patient will complete toileting with Mod A and adaptive equipment as needed. 3. Patient will complete BUE exercises to increase strength in BUE to at least a 3/5 for performance of ADLs and functional mobilty. 4. Patient will complete BUE exercises to increase coordination in E to [facility] for performance of ADLs. 5. Patient will complete seated grooming ADL with Set Up and good dynamic seated balance. 6. Patient will complete bed mobility for supine to sit with Mod A and one verbal cue for placement of UE to assist. 
7. Patient will attempt functional transfer from sit to stand with Max A and adaptive equipment as needed.  
  
Timeframe: 7 visits OCCUPATIONAL THERAPY: Daily Note OT Treatment Day # 2 Clayton Vargas is a 78 y.o. female PRIMARY DIAGNOSIS: Acute respiratory distress syndrome (ARDS) due to severe acute respiratory syndrome coronavirus 2 (SARS-CoV-2) ([facility]) Acute respiratory distress syndrome (ARDS) due to severe acute respiratory syndrome coronavirus 2 (SARS-CoV-2) ([facility] [U07.1, J80] COVID-19 [U07.1] Procedure(s) (LRB): PERCUTANEOUS ENDOSCOPIC GASTROSTOMY TUBE INSERTION/ 25/ 329 (N/A) ESOPHAGOGASTRODUODENOSCOPY (EGD) (N/A) 3 Days Post-Op Payor: [facility] MEDICARE / Plan: [address] / Product Type: Managed Care Medicare /  
ASSESSMENT:  
 
REHAB RECOMMENDATIONS: CURRENT LEVEL OF FUNCTION: 
(Most Recently Demonstrated) Recommendation to date pending progress: 
Setting: 
? [address] Equipment: ? To Be Determined Bathing: 
? Not tested Dressing: 
? Not tested Feeding/Grooming: 
? Not tested Toileting: ? Total Assistance Functional Mobility: 
? Maximal Assistance for rolling ASSESSMENT: 
 Ms. Juli Varghese is doing about the same at this time. Pt continue to have delayed reactions and responses. Pt required max assist for rolling due to soaked linen and gown. Pt tolerated rolling and bed mobility well. No c/o pain or SOB. Pt left in supine with pillows placed for comfort. No other activities performed due to having patient perform a significant amount of bed mobility. SUBJECTIVE:  
Ms. Juli Varghese states, \"I am feeling okay\" SOCIAL HISTORY/LIVING ENVIRONMENT:  
  
 
OBJECTIVE:  
 
PAIN: VITAL SIGNS: LINES/DRAINS:  
Pre Treatment: Pain Screen Pain Scale 1: Numeric (0 - 10) Pain Intensity 1: 0 Post Treatment: 0   [place] Catheter, IV and PEG 
O2 Device: Tracheal collar ACTIVITIES OF DAILY LIVING: I Mod I S SBA CGA Min Mod Max Total NT Comments BASIC ADLs:             
Bathing/ Showering [] [] [] [] [] [] [] [] [] [x] Toileting [] [] [] [] [] [] [] [] [x] [] Dressing [] [] [] [] [] [] [] [] [x] [] Feeding [] [] [] [] [] [] [] [] [] [x] Grooming [] [] [] [] [] [] [] [] [] [x] Personal Device Care [] [] [] [] [] [] [] [] [x] [] Functional Mobility [] [] [] [] [] [] [] [x] [] [] For rolling I=Independent, Mod I=Modified Independent, S=Supervision, SBA=Standby Assistance, CGA=Contact Guard Assistance,  
Min=Minimal Assistance, Mod=Moderate Assistance, Max=Maximal Assistance, Total=Total Assistance, NT=Not Tested MOBILITY: I Mod I S SBA CGA Min Mod Max Total  NT x2 Comments:  
Supine to sit [] [] [] [] [] [] [] [] [] [x] [] Sit to supine [] [] [] [] [] [] [] [] [] [x] [] Sit to stand [] [] [] [] [] [] [] [] [] [x] [] Bed to chair [] [] [] [] [] [] [] [] [] [x] [] I=Independent, Mod I=Modified Independent, S=Supervision, SBA=Standby Assistance, CGA=Contact Guard Assistance,  
Min=Minimal Assistance, Mod=Moderate Assistance, Max=Maximal Assistance, Total=Total Assistance, NT=Not Tested PLAN:  
 FREQUENCY/DURATION: OT Plan of Care: 3 times/week for duration of hospital stay or until stated goals are met, whichever comes first. 
 
TREATMENT:  
TREATMENT:  
($$ Self Care/Home Management: 23-37 mins    ) Self Care (25 Minutes): Self care including Toileting, Upper Body Dressing and rolling for linen change to increase independence and decrease level of assistance required. AFTER TREATMENT POSITION/PRECAUTIONS: 
Bed, Needs within reach, RN notified and Visitors at bedside INTERDISCIPLINARY COLLABORATION: 
RN/PCT, PT/PTA and OT/PERALTA TOTAL TREATMENT DURATION: 
OT Patient Time In/Time Out Time In: 6649 Time Out: 1018 RUTH Kay

## 2021-03-05 NOTE — PROGRESS NOTES
SPEECH PATHOLOGY NOTE: 
 
Speech therapy attempt this PM. Patient/family meeting with . Will follow up at later time/date.   
 
[address] CCC-SLP

## 2021-03-05 NOTE — PROGRESS NOTES
Care Management Interventions PCP Verified by CM: Elaine Gillis MD) Mode of Transport at Discharge: Other (see comment)(To [place]) Transition of Care Consult (CM Consult): Discharge Planning, LTAC Discharge Durable Medical Equipment: No 
Physical Therapy Consult: Yes Occupational Therapy Consult: Yes Speech Therapy Consult: Yes Current Support Network: Lives with Caregiver, Family Lives [place] Confirm Follow Up Transport: Family The Plan for Transition of Care is Related to the Following Treatment Goals : LTAC The Patient and/or Patient Representative was Provided with a Choice of Provider and Agrees with the Discharge Plan?: Yes Name of the Patient Representative Who was Provided with a Choice of Provider and Agrees with the Discharge Plan: Clarita Acuna and Lenore Blanton Freedom of Choice List was Provided with Basic Dialogue that Supports the Patient's Individualized Plan of Care/Goals, Treatment Preferences and Shares the Quality Data Associated with the Providers?: Yes [employer] Information Provided?: No 
Discharge Location Discharge Placement: [address] [place] [address]) CM informed by Abel Bond, with [facility], that pt's insurance has approved pt for LTAC. Possible bed available next week in LTAC if pt deemed medically stable. Abel Bond inquiring about use of antibiotics, if medically appropriate. CM notes Dr Kasie Castillo note from 3/4/21 stating this may need to be readdressed. Daughter, Clarita Travis, here visiting and requesting  for [facility] assistance. CM to contact . [address]  walking to pt's room to complete POA healthcare.

## 2021-03-05 NOTE — PROGRESS NOTES
Spiritual Care Visit, initial visit. Advance Directive Consult requested by daughter. This process was done around [place], and was interrupted by patient's serious illness. Visited with patient at bedside. Continued progress of assisting with a [address] Prayed for patient's healing and health. Visit by Bekah Blevins, Staff .  [place]elon., Th.B., B.A.

## 2021-03-06 PROBLEM — G93.40 ENCEPHALOPATHY: Status: RESOLVED | Noted: 2021-01-01 | Resolved: 2021-01-01

## 2021-03-06 NOTE — DISCHARGE SUMMARY
Consult for [facility] AT [facility] Discharge Summary for Monique Richard Date of Admission:  12/24/2020 Date of Transfer:  3/9/2021 Hospital Course:  
 
   Patient is a 78 y.o.  female presented with shortness of breath and hypoxia. CXR with bilateral infiltrates. She tested positive for COVID on admission. She was treated with plasma and Decadron but did not get Remdesivir due to acute renal failure. She was also treated with Rocephin and Zithromax for pneumonia. She was initially on bipap but decompensated on 12/30 and required intubation. She developed ARDS and required prolonged ventilator support. She underwent trach placement per Dr. Hector Lopez on 1/19. She required prolonged sedation due to delirium and ventilator support. Remarkably, she has now been weaned off the vent as of 3/3/21. Her trach was downsized on 3/5 to a # 6 cuffless. She has been tolerating a PMV with plans to cap as tolerated and work towards decannulation. She has been seen by speech therapy and is now on a mechanical soft diet with thin liquids. She was seen by [facility] on admission due to renal failure. She required short term dialysis from 1/12 to 1/23. Her renal function has since recovered. She is now on daily lasix for diastolic heart failure. Her last BNP on 3/3 was 3096. She had a cardiac arrest around the time she was intubated. This was felt to be due to bradycardia associated with Precedex. This medication was discontinued and she has had no further problem with her heartrate. She has been treated for an E Coli UTI and MRSA pneumonia. She is now off of antibiotics. She grew Klebsiella and Stenotrophomonas in her sputum on 2/15 but this was not treated as at that time, her daughter did not want any new antibiotics started as she was considering comfort care. She has remained afebrile with only a slight elevation in the WBC and she has minimal secretions. She required multiple transfusions for anemia but there was never any evidence of active bleeding. Recent EGD with PEG placement showed mild gastritis. She is on a PPI and her hemoglobin has now improved. The PEG was placed on 3/1 by [facility]. She was seen by neurology for encephalopathy. She required multiple sedatives to control her respiratory rate while she was on the ventilator but all of these have now been stopped. She has complained of recent insomnia and prn Trazodone has been ordered. She has recently been hypertensive so her home medications to include Cozaar and Lopressor have been restarted. She is followed by [facility] at [facility] for CAD with stent placement in 2014. She was still on Plavix at home and though this was held for weeks here, the medication was restarted on 3/5. Again, her hemoglobin has improved and there has been no evidence of bleeding. Palliative care followed to assist her daughter, Monroe Lopez, with decision making. There is another daughter, Radha Bacon, who has not visited but has been involved. Due to the severity of patient's illness, she has been a DNR for weeks and multiple discussions were had regarding terminating life support. Monroe Lopez was in favor of this as she felt that her mother was suffering but Radha Bacon would not agree. The plan at present is for patient to remain a DNR but for all other aggressive measures be taken ie new antibiotics, transfusions, labs. She is being seen by PT with plans to transfer to [facility] for further acute care. She has been off of isolation since 1/15/21. Consultants: [facility] Neurology Palliative Care [facility] 
                        PT 
 
 
Studies and Procedures: multiple CXRs Central line placement - 12/30/20 Intubation - 12/30/20 Dialysis catheter placement - 1/11/21 Trach- 1/19/21 PEG - 3/1/21 EEG - The results of this EEG are abnormal. Lloyd Luke is  
slowing of the background consistent with a moderate to severe  
degree of encephalopathy, nonspecific in origin but representing  
global CNS dysfunction.  There are no lateralizing features or  
epileptiform discharges. CT head - No CT evidence of acute intracranial abnormality MRI brain - Stable age-related senescent changes and chronic microvascular disease with 
remote right posterior temporal occipital ischemic insult. Echo -  Left ventricle: Systolic function was normal. Ejection fraction was 
estimated in the range of 60 % to 65 %. There were no regional wall motion abnormalities. There was mild concentric hypertrophy. The E/e' ratio was 20.75. There was diastolic dysfunction. -  Left atrium: The atrium was mildly dilated.    
-  Tricuspid valve: There was mild to moderate regurgitation. Condition on Discharge:  Improved, stable Disposition:  To LTAC Review of Systems Constitutional: positive for generalized weakness Eyes: positive for no noted visual changes Ears, nose, mouth, throat, and face: positive for now with trach and weakened voice Respiratory: positive for remains on low flow oxygen support. has trach with minimal secretions. no dyspnea at rest 
Cardiovascular: positive for no chest pain or edema but elevated BNP so on daily lasix. history of CAD with stent. history of bradycardia secondary to precedex Gastrointestinal: now with PEG but on oral diet with good intake so TF stopped.  incontinent of stool due to debility Genitourinary:positive for urinary incontinence Integument/breast: negative Hematologic/lymphatic: positive for anemia requiring transfusion Musculoskeletal:positive for generalized weakness Neurological: positive for memory problems Behavioral/Psych: none Endocrine: insulin dependent diabetes with a history of neuropathy - was on neurontin at home Allergic/Immunologic: none Patient Active Problem List  
Diagnosis Code  Diabetes mellitus with hyperosmolarity without hyperglycemic hyperosmolar nonketotic coma ([facility] E11.00  Coronary artery disease involving coronary bypass graft of native heart without angina pectoris I25.810  
 Uncontrolled type 2 diabetes mellitus with hyperglycemia (HCC) E11.65  
 Essential hypertension I10  
 HLD (hyperlipidemia) E78.5  Chronic kidney disease, stage III (moderate) N18.30  
 Osteopenia M85.80  Vitamin D deficiency E55.9  Gastroesophageal reflux disease without esophagitis K21.9  Other allergic rhinitis J30.89  Peripheral neuropathy G62.9  
 Primary osteoarthritis involving multiple joints M89.49  
 Insomnia G47.00  RLS (restless legs syndrome) G25.81  
 Anemia D64.9  Generalized weakness R53.1  Decreased oral intake R63.8  Acute on chronic renal failure (HCC) N17.9, N18.9  Noncompliance Z91.19  
 Hypoalbuminemia due to protein-calorie malnutrition ([facility] E88.09, E46  
 COVID-19 U07.1  Acute respiratory distress syndrome (ARDS) due to severe acute respiratory syndrome coronavirus 2 (SARS-CoV-2) ([facility]) U07.1, J80  Acute hypoxemic respiratory failure ([facility]) J96.01  
 Cardiac arrest ([facility]) I46.9  
 Encephalopathy G93.40 Prior to Admission Medications Prescriptions Last Dose Informant Patient Reported? Taking? Blood-Glucose Meter (ONETOUCH ULTRAMINI) monitoring kit   No No  
Sig: Use as directed Insulin Needles, Disposable, 31 gauge x 5/16\" ndle   No No  
Sig: by SubCUTAneous route Before breakfast, lunch, dinner and at bedtime. Lancets (ONETOUCH ULTRASOFT LANCETS) misc   No No  
Sig: Test 4 times daily as directed  
aspirin delayed-release 81 mg tablet   Yes No  
Sig: Take  by mouth daily. atorvastatin (LIPITOR) 40 mg tablet   Yes No  
Sig: Take 40 mg by mouth. clopidogrel (PLAVIX) 75 mg tab   Yes No  
Sig: Take 75 mg by mouth.  
gabapentin (NEURONTIN) 100 mg capsule   No No  
Sig: Take 1 Cap by mouth three (3) times daily. Patient taking differently: Take 100 mg by mouth two (2) times a day. glucose blood VI test strips (ONETOUCH ULTRA TEST) strip   No No  
Sig: Test 4 times daily as directed  
insulin glargine (LANTUS) 100 unit/mL injection   No No  
Si Units by SubCUTAneous route nightly. insulin lispro (HUMALOG) 100 unit/mL injection   No No  
Sig: 15 Units by SubCUTAneous route Before breakfast, lunch, and dinner. losartan (COZAAR) 100 mg tablet   Yes No  
Sig: Take  by mouth daily. metoprolol tartrate (LOPRESSOR) 25 mg tablet   No No  
Sig: Take 0.5 Tabs by mouth every twelve (12) hours. rOPINIRole (REQUIP) 0.5 mg tablet   No No  
Sig: Take 1 Tab by mouth nightly as needed. rosuvastatin (CRESTOR) 20 mg tablet   No No  
Sig: Take 1 Tab by mouth nightly. Facility-Administered Medications: None Past Medical History:  
Diagnosis Date  Acute cystitis without hematuria 2019  Acute pancreatitis 2019  CAD (coronary artery disease)  Diabetic ketoacidosis ([facility] 3/8/2020  DM2 (diabetes mellitus, type 2) ([facility]  Elevated liver function tests 2019  Gout  Gram-negative bacteremia 2019  HLD (hyperlipidemia)  HTN (hypertension)  Peripheral neuropathy  Right lower quadrant abdominal pain 2019 Past Surgical History:  
Procedure Laterality Date  HX CHOLECYSTECTOMY jennifer in 1964  HX CORONARY ARTERY BYPASS GRAFT    
 x3  
 HX TUBAL LIGATION    
 IR INSERT NON TUNL CVC OVER 5 YRS  2021  VT CARDIAC SURG PROCEDURE UNLIST    
 stents x 3 2009 Social History Socioeconomic History  Marital status: SINGLE Spouse name: Not on file  Number of children: Not on file  Years of education: Not on file  Highest education level: Not on file Occupational History  Occupation: retired  Social Needs  Financial resource strain: Not on file  Food insecurity Worry: Not on file Inability: Not on file  Transportation needs Medical: Not on file Non-medical: Not on file Tobacco Use  Smoking status: Never Smoker  Smokeless tobacco: Never Used Substance and Sexual Activity  Alcohol use: Yes Comment: socially  Drug use: No  
 Sexual activity: Not on file Lifestyle  Physical activity Days per week: Not on file Minutes per session: Not on file  Stress: Not on file Relationships  Social connections Talks on phone: Not on file Gets together: Not on file Attends Gnosticist service: Not on file Active member of club or organization: Not on file Attends meetings of clubs or organizations: Not on file Relationship status: Not on file  Intimate partner violence Fear of current or ex partner: Not on file Emotionally abused: Not on file Physically abused: Not on file Forced sexual activity: Not on file Other Topics Concern  Not on file Social History Narrative Lives alone but has a caregiver who helps several hours per day, several days per week Family History Problem Relation Age of Onset  Hypertension Mother  Cancer Mother  Diabetes Mother  Heart Disease Mother Allergies Allergen Reactions  Sulfa (Sulfonamide Antibiotics) Swelling Objective:  
 
Vitals:  
 03/06/21 0400 03/06/21 0458 03/06/21 0500 03/06/21 0600 BP: (!) 149/79  123/66 (!) 143/71 Pulse: 67  65 72 Resp: 16  (!) 0 25 Temp: 97.3 °F (36.3 °C) SpO2: 99% 97% 97% 96% Weight: Height:      
 
 
 
Intake/Output Summary (Last 24 hours) at 3/6/2021 6934 Last data filed at 3/6/2021 1805 Gross per 24 hour Intake  Output 375 ml Net -375 ml PHYSICAL EXAM  
 
Physical Exam:  
Constitution:  the patient is well developed and in no acute distress EENMT:  Sclera clear, pupils equal, oral mucosa moist 
Respiratory: Trach in place. B inspiratory crackles Cardiovascular:  RRR without M,G,R 
Gastrointestinal: soft and non-tender; with positive bowel sounds. Musculoskeletal: warm without cyanosis. There is no lower leg edema. Skin:  no jaundice or rashes, no wounds Neurologic: no gross neuro deficits. Very weak and debilitated, however. Psychiatric:  alert and oriented x person, place 
  
 
Recent Labs 03/05/21 
1844 03/04/21 
8133 WBC 13.1* 12.3* HGB 9.9* 9.9*  
HCT 30.5* 30.5*  238 Recent Labs 03/05/21 
0752 03/04/21 
3230  144  
K 3.7 3.4*  
 105 * 166* CO2 34* 36*  
BUN 65* 69* CREA 1.19* 1.18* Medications at the time of Discharge:  
 
Medication Dose Route Frequency  albuterol (PROVENTIL VENTOLIN) nebulizer solution 2.5 mg  2.5 mg Nebulization TID RT  
 clopidogreL (PLAVIX) tablet 75 mg  75 mg Oral DAILY  insulin regular (NOVOLIN R, HUMULIN R) injection   SubCUTAneous AC&HS  pantoprazole (PROTONIX) tablet 40 mg  40 mg Oral ACB  losartan (COZAAR) tablet 100 mg  100 mg Oral DAILY  rosuvastatin (CRESTOR) tablet 20 mg  20 mg Oral QHS  metoprolol tartrate (LOPRESSOR) tablet 25 mg  25 mg Oral Q12H  
 Saccharomyces boulardii (FLORASTOR) capsule 250 mg  250 mg Oral BID  docusate (COLACE) 50 mg/5 mL oral liquid 100 mg  100 mg Oral DAILY PRN  
 loperamide (IMODIUM) 1 mg/7.5 mL oral solution 2 mg  2 mg Oral QID PRN  
 traZODone (DESYREL) tablet 50 mg  50 mg Oral QHS PRN  
 diclofenac (VOLTAREN) 1 % topical gel 2 g  2 g Topical Q6H PRN  
 insulin glargine (LANTUS) injection 20 Units  20 Units SubCUTAneous DAILY  hydrALAZINE (APRESOLINE) 20 mg/mL injection 10 mg  10 mg IntraVENous Q6H PRN  
 heparin (porcine) 1,000 unit/mL injection 5,000 Units  5,000 Units IntraVENous DIALYSIS PRN  
 alcohol 62% (NOZIN) nasal  1 Ampule  1 Ampule Topical Q12H  
 heparin (porcine) injection 5,000 Units  5,000 Units SubCUTAneous Q8H  
 NUTRITIONAL SUPPORT ELECTROLYTE PRN ORDERS   Does Not Apply PRN  
 dextrose 40% (GLUTOSE) oral gel 1 Tube  15 g Oral PRN  
 glucagon (GLUCAGEN) injection 1 mg  1 mg IntraMUSCular PRN  
 dextrose (D50W) injection syrg 12.5-25 g  25-50 mL IntraVENous PRN  
 lip protectant (BLISTEX) ointment 1 Each  1 Each Topical PRN  
 acetaminophen (TYLENOL) tablet 650 mg  650 mg Oral Q6H PRN  
 sodium chloride (NS) flush 5-40 mL  5-40 mL IntraVENous Q8H  
  
 
 
Assessment:  (Medical Decision Making) Hospital Problems  Date Reviewed: 2/5/2021 Codes Class Noted POA Encephalopathy ICD-10-CM: G93.40 ICD-9-CM: 348.30  2/7/2021 Unknown Cardiac arrest [facility]) ICD-10-CM: I46.9 ICD-9-CM: 427.5  12/30/2020 No  
   
 COVID-19 ICD-10-CM: U07.1 ICD-9-CM: 079.89  12/24/2020 Yes * (Principal) Acute respiratory distress syndrome (ARDS) due to severe acute respiratory syndrome coronavirus 2 (SARS-CoV-2) ([facility]) ICD-10-CM: U07.1, J80 
ICD-9-CM: 518.82, 079.89  12/24/2020 No  
   
 Acute hypoxemic respiratory failure ([facility] ICD-10-CM: J96.01 
ICD-9-CM: 518.81  12/24/2020 Yes Acute on chronic renal failure (HCC) ICD-10-CM: N17.9, N18.9 ICD-9-CM: 584.9, 585.9  9/17/2019 Yes Coronary artery disease involving coronary bypass graft of native heart without angina pectoris (Chronic) ICD-10-CM: T87.092 ICD-9-CM: 414.05  6/15/2015 Yes Uncontrolled type 2 diabetes mellitus with hyperglycemia (HCC) (Chronic) ICD-10-CM: E11.65 ICD-9-CM: 250.02  6/15/2015 Yes  Diabetes mellitus with hyperosmolarity without hyperglycemic hyperosmolar nonketotic coma ([facility] (Chronic) ICD-10-CM: E11.00 ICD-9-CM: 250.20  11/24/2014 Yes Plan:  (Medical Decision Making) 1. To [facility] for further medical management 2. Cap trach as tolerated - work towards decannulation 3. Mechanical soft diet with thin liquids per speech therapy 4. PEG placed 3/1 - clamped for now - monitor oral intake 5. Home medications restarted for hypertension - monitor BP 6. Anemia improved. Home Plavix restarted for history of CAD/stents (2014). Follow up with [facility] 7. Low dose heparin for DVT prophylaxis 8. PPI for mild gastritis noted with EGD 9. PT/OT for debility 10. DNR status - Josh Jurado is the daughter who has been visiting regularly and assisting with decision making 11. Continue daily lasix for diastolic heart failure 12. COVID recovery - off isolation since 1/15/21 13. Lantus for hyperglycemia/diabetes. Monitor SQBS More than 50 % of the time documented was spent in face-to-face contact with the patient and in the care of the patient on the floor/unit where the patient is located.  
 
Cee Yang NP

## 2021-03-06 NOTE — PROGRESS NOTES
TRANSFER - OUT REPORT: 
 
Verbal report given to Sophia RN(name) on Marcia Arnold  being transferred to 8th floor(unit) for routine progression of care Report consisted of patients Situation, Background, Assessment and  
Recommendations(SBAR). Information from the following report(s) SBAR, Kardex, ED Summary, Procedure Summary, Intake/Output, MAR and Recent Results was reviewed with the receiving nurse. Lines:  
Peripheral IV 03/02/21 Anterior;Left;Proximal Forearm (Active) Site Assessment Clean, dry, & intact 03/05/21 1600 Phlebitis Assessment 0 03/05/21 1600 Infiltration Assessment 0 03/05/21 1600 Dressing Status Clean, dry, & intact 03/05/21 1600 Dressing Type Tape;Transparent 03/05/21 0700 Hub Color/Line Status Flushed;Patent 03/05/21 0700 Alcohol Cap Used No 03/05/21 0700 Peripheral IV 03/02/21 Left;Posterior Hand (Active) Site Assessment Clean, dry, & intact 03/05/21 1600 Phlebitis Assessment 0 03/05/21 1600 Infiltration Assessment 0 03/05/21 1600 Dressing Status Clean, dry, & intact 03/05/21 1600 Dressing Type Tape;Transparent 03/05/21 0700 Hub Color/Line Status Flushed;Patent 03/05/21 0700 Alcohol Cap Used No 03/05/21 0700 Opportunity for questions and clarification was provided. Patient transported with: 
 O2 @ 35 % liters Tech

## 2021-03-06 NOTE — CONSULTS
Comprehensive Nutrition Assessment Type and Reason for Visit: Esthela Valiente Tube Feeding Management (Pulmonary) Poor intake/appetite 5 or more days (Pulmonary) Nutrition Recommendations/Plan:  
Meals and Snacks: 
Continue current diet. Nutrition Supplement Therapy: ? Medical food supplement therapy:  Initiate Glucerna Shake three times per day (this provides 220 kcal and 10 grams protein per bottle) Enteral Nutrition:  
Change to Bolus Enteral Feedings:  
Nepro via PEG Bolus 240 ml (1 carton) times 3/day. Water flush 175 ml before and after each bolus. Suggested Feeding Times:0800, 1400, 2000 At goal will provide 1260 kcal (89% estimated calorie needs), 57 grams protein (84% estimated protein needs) and 1266 ml free fluid (~1ml/kcal). If a bolus is missed total volume of feedings per day can be achieved by increasing the following feedings by half of the volume missed until the volume is completed for the missed bolus. Malnutrition Assessment: 
Malnutrition Status: Insufficient data Nutrition Assessment:  
Nutrition History: 12/31: 3 recorded meals in past 6 days with average intake of 83% Nutrition Background: H/O: CAD, HTN, DM, HLD, peripheral neuropathy, CKD stage III. Presented with dyspnea. Findings of +COVID. Admitted to ICU with ARDS, severe acute hypoxic due to COVID PNA, PRATIMA on CKD. Was requiring BIPAP at night and Aviro during day. Had brief cardiac arrest and was intubated 12/30 Daily Update: 
RD visualized am tray earlier today pt consumed juice and 2 sips of ensure. Visit with pt and daughter this afternoon, pt ate a few bites of noon meal and refused the rest per daughter. Daughter reports after first meal intake the only significant nutrition she has consumed was an ensure yesterday. Pt is oriented to person at RD visit, attempts to converse but only answers in increments. Daughter reports she's \"been all over the place today. \"   
 Abdominal Status (last documented): Intact abdomen with Active  bowel sounds. Last BM (03/03/2021). Pertinent Medications: lasix, lantus (held today), SSI, protonix, florastor Pertinent Labs:  
Lab Results Component Value Date/Time Sodium 142 03/05/2021 05:39 AM  
 Potassium 3.7 03/05/2021 05:39 AM  
 Chloride 103 03/05/2021 05:39 AM  
 CO2 34 (H) 03/05/2021 05:39 AM  
 Anion gap 5 (L) 03/05/2021 05:39 AM  
 Glucose 138 (H) 03/05/2021 05:39 AM  
 BUN 65 (H) 03/05/2021 05:39 AM  
 Creatinine 1.19 (H) 03/05/2021 05:39 AM  
 Calcium 9.7 03/05/2021 05:39 AM  
 Albumin 3.0 (L) 01/06/2021 02:02 AM  
 Magnesium 2.3 03/01/2021 04:01 AM  
 Phosphorus 3.3 03/01/2021 04:01 AM  
 
Nutrition Related Findings:  
Intubated and OGT placed 12/30. CRRT initiated on 1/12, last was 1/23. TF formula changed to a renal formula on 1/11. s/p Trach 1/19, NG placed 1/19. NGT replaced 1/29. PMV trials. 20 Fr PEG placed 3/2. SLP MBS 3/4, diet initiated. TF held after pt consumed one meal of 75% on 3/4. Wound Type: Stage II(to elbow) Current Nutrition Therapies: DIET TUBE FEEDING Administer 1 pouches Prosource TF via open syringe into FT @ 1800 daily. Flush FT with 50 ml water before and after administration of protein. DIET DIABETIC CONSISTENT CARB Mechanical Soft Current Tube Feeding (TF) Orders: · Feeding Route: PEG 
· Formula: Nepro · Schedule:Continuous · Regimen: 35 ml/hr · Additives/Modulars: Protein(1 packet prosource TF per day with 50ml water bofore/after) · Water Flushes: 50 ml/hr · Current TF & Flush Orders Provides: held since 3/4 evening · Goal TF & Flush Orders Provides: 1426 kcal (100% needs), 73 g pro (100% needs), and 1760 ml free water (1.2 ml/kcal) calculations based on 22 hr infusion Current Intake: Average Meal Intake: 1-25% Average Supplement Intake: 1-25% Additional Caloric Sources: none Anthropometric Measures: 
Height: 5' 5\" (165.1 cm) Current Body Wt: 63.6 kg (140 lb 3.4 oz)(3/4), Weight source: Bed scale BMI: 24.4, Normal weight (BMI 22.0-24.9) age over 72 Admission Body Weight: 159 lb 2.8 oz(12/25 bed scale) Ideal Body Weight (lbs) (Calculated): 125 lbs (57 kg), 123.8 % Usual Body Wt: 73.5 kg (162 lb)(Per EMR 11/5/82020), Percent weight change: -4.5 Edema: Generalized: Trace (3/5/2021  7:00 PM) Estimated Daily Nutrient Needs: 
Energy (kcal/day): 9768-6059 (Kcal/kg(25-30), Weight Used: Ideal(56.8)) Protein (g/day): 68-86 (1.2-1.5 g/kg) Weight Used: (Ideal) Fluid (ml/day):   (1 ml/kcal) Nutrition Diagnosis:  
· Inadequate protein-energy intake related to (poor appetite, disinterest in oral intake) as evidenced by (TF held and po intake <25% estimated needs s/p MBS) Nutrition Interventions:  
Food and/or Nutrient Delivery: Continue current diet, Start oral nutrition supplement, Modify tube feeding Coordination of Nutrition Care: Continue to monitor while inpatient Plan of Care discussed with Sulma Adorno RN. Goals:  
Previous Goal Met: Progress towards goal(s) declining Active Goal: New Goal: Toelrate bolus feeds for majority of nutrition until po intake meets >50% estimated needs Nutrition Monitoring and Evaluation:  
  
Food/Nutrient Intake Outcomes: Food and nutrient intake, Supplement intake, Enteral nutrition intake/tolerance Physical Signs/Symptoms Outcomes: Biochemical data, GI status, Chewing or swallowing, Weight, Meal time behavior Discharge Planning:   
Enteral nutrition, Continue current diet Rodo Bone RD, CAIT on 3/6/2021 at 3:23 PM 
Contact: [phone]

## 2021-03-06 NOTE — PROGRESS NOTES
Care Daily Progress Note: 3/6/2021 Admission Date: 12/24/2020 The patient's chart is reviewed and the patient is discussed with the staff. 79 yo female admitted 12/24 with acute respiratory failure secondary to COVID 19.  Pt was treated with Decadron and convalescent plasma. She had PRATIMA and was not treated with Remdesivir.  She was initially on BiPAP but pt had a  cardiac arrest on 12/30 requiring intubation.  She required prolonged vent support so a trach was placed on 1/19. Pt has been treated for an E coli UTI as well as MRSA in her sputum. She developed acute on chronic renal failure and started on dialysis on 1/12 but now off of since 1/25 and there are no plans to restart. Edin Zimmerman consulted for PEG but this on hold pending family discussion regarding need for long term care. Pt was seen by Neurology with an abnormal EEG with moderate to severe slowing. Brain MRI 1/26 revealed stable age-related senescent changes and chronic microvascular disease with remote right posterior temporal occipital ischemic insult. No evidence anoxic brain injury per neuro - deep encephalopathy. Have gradually been able to reduce sedatives. Now tolerating T collar trials and felt to be able to follow some commands. PEG placed 3/2. Now on mechanical soft diet with thin liquids. Off vent since 3/3. Has been approved for "[facility] - will probably move there the week of 3/8. Subjective:  
Now on floor. On 6 LPM trach collar (28%). Tolerating well. Still awaiting bed placement at "[facility]. Per CM, likely be next week. Pt is awake. Denies any pain, SOB, or cough. Appears weak. Current Facility-Administered Medications Medication Dose Route Frequency  clopidogreL (PLAVIX) tablet 75 mg  75 mg Oral DAILY  albuterol (PROVENTIL VENTOLIN) nebulizer solution 2.5 mg  2.5 mg Nebulization TID  insulin regular (NOVOLIN R, HUMULIN R) injection   SubCUTAneous AC&HS  
  pantoprazole (PROTONIX) tablet 40 mg  40 mg Oral ACB  furosemide (LASIX) tablet 40 mg  40 mg Oral DAILY  losartan (COZAAR) tablet 100 mg  100 mg Oral DAILY  rosuvastatin (CRESTOR) tablet 20 mg  20 mg Oral QHS  metoprolol tartrate (LOPRESSOR) tablet 25 mg  25 mg Oral Q12H  
 Saccharomyces boulardii (FLORASTOR) capsule 250 mg  250 mg Oral BID  docusate (COLACE) 50 mg/5 mL oral liquid 100 mg  100 mg Oral DAILY PRN  
 loperamide (IMODIUM) 1 mg/7.5 mL oral solution 2 mg  2 mg Oral QID PRN  
 traZODone (DESYREL) tablet 50 mg  50 mg Oral QHS PRN  
 diclofenac (VOLTAREN) 1 % topical gel 2 g  2 g Topical Q6H PRN  
 insulin glargine (LANTUS) injection 20 Units  20 Units SubCUTAneous DAILY  hydrALAZINE (APRESOLINE) 20 mg/mL injection 10 mg  10 mg IntraVENous Q6H PRN  
 heparin (porcine) 1,000 unit/mL injection 5,000 Units  5,000 Units IntraVENous DIALYSIS PRN  
 alcohol 62% (NOZIN) nasal  1 Ampule  1 Ampule Topical Q12H  
 heparin (porcine) injection 5,000 Units  5,000 Units SubCUTAneous Q8H  
 NUTRITIONAL SUPPORT ELECTROLYTE PRN ORDERS   Does Not Apply PRN  
 dextrose 40% (GLUTOSE) oral gel 1 Tube  15 g Oral PRN  
 glucagon (GLUCAGEN) injection 1 mg  1 mg IntraMUSCular PRN  
 dextrose (D50W) injection syrg 12.5-25 g  25-50 mL IntraVENous PRN  
 lip protectant (BLISTEX) ointment 1 Each  1 Each Topical PRN  
 acetaminophen (TYLENOL) tablet 650 mg  650 mg Oral Q6H PRN  
 sodium chloride (NS) flush 5-40 mL  5-40 mL IntraVENous Q8H Objective:  
 
Vitals:  
 03/06/21 0913 03/06/21 0931 03/06/21 1001 03/06/21 1058 BP:  139/68 (!) 155/73 (!) 152/81 Pulse:  62 64 65 Resp:  22 22 21 Temp:    98.1 °F (36.7 °C) SpO2: 97% 96% 97% 92% Weight:      
Height:      
 
 
Intake and Output:  
03/04 1901 - 03/06 0700 In: 120 Out: 1025 [Urine:1025] No intake/output data recorded. Physical Exam: Constitutional:  Awake and alert. Wearing PMV and voice fairly strong. Conversation appropriate. HEENT:  Sclera clear, pupils equal, oral mucosa moist.   
RESPIRATORY:  Clear bilaterally from anterior - crackles from posterior bilaterally. Oxygen via t collar - 28%. Wearing PMV CARDIOVASCULAR:  RRR with no M,G,R 
ABDOMEN/GI:  soft and with no tenderness; positive bowel sounds present EXTREMITIES:  warm with no cyanosis, no lower leg edema SKIN:  no jaundice or ecchymosis NEURO: awake and alert. Seems appropriate MUSCULOSKELETAL: moves all four extremities. No deformities ROS:  
Review of Systems Constitutional: Positive for malaise/fatigue. Respiratory: Positive for shortness of breath (with exertion). Negative for hemoptysis and sputum production. Cardiovascular: Negative for chest pain, palpitations, orthopnea and claudication. Gastrointestinal: Negative for heartburn and nausea. Neurological: Negative for dizziness and headaches. LINES:  PEG, #6 XLT Trach (PMV), peripheral IV, external female catheter CHEST XRAY:  None today LAB Recent Labs 03/05/21 
6565 03/04/21 
4477 WBC 13.1* 12.3* HGB 9.9* 9.9*  
HCT 30.5* 30.5*  238 Recent Labs 03/05/21 
3547 03/04/21 
4536  144  
K 3.7 3.4*  
 105 CO2 34* 36* * 166* BUN 65* 69* CREA 1.19* 1.18* CA 9.7 9.5 Assessment:  (Medical Decision Making) Hospital Problems  Date Reviewed: 2/5/2021 Codes Class Noted POA Cardiac arrest [facility]) ICD-10-CM: I46.9 ICD-9-CM: 427.5  12/30/2020 No  
   
 COVID-19 ICD-10-CM: U07.1 ICD-9-CM: 079.89  12/24/2020 Yes * (Principal) Acute respiratory distress syndrome (ARDS) due to severe acute respiratory syndrome coronavirus 2 (SARS-CoV-2) ([facility]) ICD-10-CM: U07.1, J80 
ICD-9-CM: 518.82, 079.89  12/24/2020 No  
   
 Acute hypoxemic respiratory failure ([facility] [address] ICD-10-CM: J96.01 
ICD-9-CM: 518.81  12/24/2020 Yes Acute on chronic renal failure (HCC) ICD-10-CM: N17.9, N18.9 ICD-9-CM: 584.9, 585.9  9/17/2019 Yes Coronary artery disease involving coronary bypass graft of native heart without angina pectoris (Chronic) ICD-10-CM: O34.789 ICD-9-CM: 414.05  6/15/2015 Yes Uncontrolled type 2 diabetes mellitus with hyperglycemia (HCC) (Chronic) ICD-10-CM: E11.65 ICD-9-CM: 250.02  6/15/2015 Yes Diabetes mellitus with hyperosmolarity without hyperglycemic hyperosmolar nonketotic coma (HCC) (Chronic) ICD-10-CM: E11.00 ICD-9-CM: 250.20  11/24/2014 Yes Plan:  (Medical Decision Making) --moved from ICU today and doing well 
--CXR for Monday 
--repeat labs on Monday ordered  
--Off vent for greater than 48 hours. Tolerating PMV with low flow oxygen Likely can Downsize trach today (was just uncuffed yesterday, does not seem to have been changed )and work towards capping/decannulation. --cont working with ST, has PEG but on mechanical soft diet with thin liquids. Off TF 
--cont PT 
--transfer to [facility] pending, hopefully bed placement beginning of next week 
--DNR  Will leave her a DNR but otherwise provide aggressive care since she is doing so much better. --cont Protonix/Low dose heparin for DVT prophylaxis - mild gastritis noted with EGD. Hemoglobin stable. --cont lantus and SSI, SQBS in 90s-120s 
--cont with Noted hypertension - Cozaar and Lopressor restarted 3/4 (home meds). --on plavix, was on PTA for stents in 2014. Restarted yesterday. Followed by [facility] Willaim Moment, NP Lungs:  clear Heart:  RRR with no Murmur/Rubs/Gallops Additional Comments:  Moved to floor yesterday,, plans are to go to [facility] soon I have spoken with and examined the patient. I agree with the above assessment and plan as documented.  
 
Nestor Su MD 
 
 
 
 More than 50% of time documented was spent face-to-face contact with the patient and in the care of the patient on the floor/unit where the patient is located. I have spoken with and examined the patient. I agree with the above assessment and plan as documented.

## 2021-03-06 NOTE — ROUTINE PROCESS
Trach tube downsized as per ordered from a 6LXT to a 4CFS, with no complications noted. Patient tolerated this well. Placement verified (see flowsheet) and patient left on 28% trach collar.

## 2021-03-06 NOTE — PROGRESS NOTES
TRANSFER - IN REPORT: 
 
Verbal report received from [facility] (name) on Roxy Damon  being received from ICU(unit) for routine progression of care Report consisted of patients Situation, Background, Assessment and  
Recommendations(SBAR). Information from the following report(s) SBAR, Kardex and MAR was reviewed with the receiving nurse. Opportunity for questions and clarification was provided. Assessment completed upon patients arrival to unit and care assumed.

## 2021-03-06 NOTE — PROGRESS NOTES
Discussed plan of care with daughter, oRnda Durant, and granddaughter. Patient has transfer orders to the floor and [facility]. Hodan Loyola would like for patient to go home as soon as possible, and I explained that rehabilitation is appropriate for the patient and is needed before going home. I told her case management would speak to her about the patient's options. When I came in to give the patient her bedtime medication, which included trazodone for sleep. The granddaughter said Toñito Mcelroy didn't want her grandmother taking that dope. \" I explained that the patient had not been sleeping, and we were trying to get her back on track. The granddaughter stated \"she hasn't been sleeping, because she is anxious. You have been trying to kill her. \"  I asked if they would prefer melatonin. The daughter said she would. Unfortunately, melatonin is not stocked in the pharmacy, but they can bring a home supply. After telling the family that visiting hours were over multiple times, the family preceded to leave the unit. While leaving, they were verbally aggressive, cursing at staff members. Charge nurse notified, house supervisor also notified.

## 2021-03-07 NOTE — PROGRESS NOTES
Report received from [facility]. Patient resting quietly in bed. Respirations present, even and unlabored. Bed low and locked, safety measures in place. No signs of distress, no needs expressed. by the patient. Call light within reach. Will continue to monitor.

## 2021-03-07 NOTE — PROGRESS NOTES
Pt has slept most of this shift. Pt will open eyes when spoken to and then goes back to sleep. Pt attempted to bite the nurse when changing inner cannula. Pt has had very poor po intake for today. No BM for this shift. Call light in reach, door open will monitor.

## 2021-03-07 NOTE — PROGRESS NOTES
Report given to [facility] RN. Patient resting quietly in bed, RT is currently in the room with the patient. Bed low and locked, bed alarm on, safety measures in place. No signs of distress, no needs expressed. Call light within reach, encouraged patient to call with any needs.

## 2021-03-07 NOTE — PROGRESS NOTES
Problem: Falls - Risk of 
Goal: *Absence of Falls Description: Document Jessie Minor Fall Risk and appropriate interventions in the flowsheet. Outcome: Progressing Towards Goal 
Note: Fall Risk Interventions: 
Mobility Interventions: Bed/chair exit alarm Mentation Interventions: Bed/chair exit alarm Medication Interventions: Evaluate medications/consider consulting pharmacy Elimination Interventions: Toileting schedule/hourly rounds History of Falls Interventions: Bed/chair exit alarm

## 2021-03-07 NOTE — PROGRESS NOTES
This RN was made aware of the patient's second watery stool since the beginning of the shift on 3/6 @1900. The second watery stool was thin enough to be suctioned up through the [address] in place. This RN made MD aware of the above via perfect serve, requesting an order to get a C. Diff sample. Will continue to monitor.

## 2021-03-07 NOTE — PROGRESS NOTES
Care Daily Progress Note: 3/7/2021 Admission Date: 12/24/2020 The patient's chart is reviewed and the patient is discussed with the staff. 77 yo female admitted 12/24 with acute respiratory failure secondary to COVID 19.  Pt was treated with Decadron and convalescent plasma. She had PRATIMA and was not treated with Remdesivir.  She was initially on BiPAP but pt had a  cardiac arrest on 12/30 requiring intubation.  She required prolonged vent support so a trach was placed on 1/19. Pt has been treated for an E coli UTI as well as MRSA in her sputum. She developed acute on chronic renal failure and started on dialysis on 1/12 but now off of since 1/25 and there are no plans to restart. Ally Mazariegos consulted for PEG but this on hold pending family discussion regarding need for long term care. Pt was seen by Neurology with an abnormal EEG with moderate to severe slowing. Brain MRI 1/26 revealed stable age-related senescent changes and chronic microvascular disease with remote right posterior temporal occipital ischemic insult. No evidence anoxic brain injury per neuro - deep encephalopathy. Have gradually been able to reduce sedatives. Now tolerating T collar trials and felt to be able to follow some commands. PEG placed 3/2. Now on mechanical soft diet with thin liquids. Off vent since 3/3. Has been approved for [facility] - will probably move there the week of 3/8. Subjective:  
Now on floor. On 7 LPM trach collarTolerating well. Still awaiting bed placement at [facility]. Per CM, likely be next week. Pt is awake. Denies any pain, SOB, or cough. Appears weak. Current Facility-Administered Medications Medication Dose Route Frequency  clopidogreL (PLAVIX) tablet 75 mg  75 mg Oral DAILY  albuterol (PROVENTIL VENTOLIN) nebulizer solution 2.5 mg  2.5 mg Nebulization TID  insulin regular (NOVOLIN R, HUMULIN R) injection   SubCUTAneous AC&HS  
  pantoprazole (PROTONIX) tablet 40 mg  40 mg Oral ACB  furosemide (LASIX) tablet 40 mg  40 mg Oral DAILY  losartan (COZAAR) tablet 100 mg  100 mg Oral DAILY  rosuvastatin (CRESTOR) tablet 20 mg  20 mg Oral QHS  metoprolol tartrate (LOPRESSOR) tablet 25 mg  25 mg Oral Q12H  
 Saccharomyces boulardii (FLORASTOR) capsule 250 mg  250 mg Oral BID  docusate (COLACE) 50 mg/5 mL oral liquid 100 mg  100 mg Oral DAILY PRN  
 loperamide (IMODIUM) 1 mg/7.5 mL oral solution 2 mg  2 mg Oral QID PRN  
 traZODone (DESYREL) tablet 50 mg  50 mg Oral QHS PRN  
 diclofenac (VOLTAREN) 1 % topical gel 2 g  2 g Topical Q6H PRN  
 insulin glargine (LANTUS) injection 20 Units  20 Units SubCUTAneous DAILY  hydrALAZINE (APRESOLINE) 20 mg/mL injection 10 mg  10 mg IntraVENous Q6H PRN  
 heparin (porcine) 1,000 unit/mL injection 5,000 Units  5,000 Units IntraVENous DIALYSIS PRN  
 alcohol 62% (NOZIN) nasal  1 Ampule  1 Ampule Topical Q12H  
 heparin (porcine) injection 5,000 Units  5,000 Units SubCUTAneous Q8H  
 NUTRITIONAL SUPPORT ELECTROLYTE PRN ORDERS   Does Not Apply PRN  
 dextrose 40% (GLUTOSE) oral gel 1 Tube  15 g Oral PRN  
 glucagon (GLUCAGEN) injection 1 mg  1 mg IntraMUSCular PRN  
 dextrose (D50W) injection syrg 12.5-25 g  25-50 mL IntraVENous PRN  
 lip protectant (BLISTEX) ointment 1 Each  1 Each Topical PRN  
 acetaminophen (TYLENOL) tablet 650 mg  650 mg Oral Q6H PRN  
 sodium chloride (NS) flush 5-40 mL  5-40 mL IntraVENous Q8H Objective:  
 
Vitals:  
 03/07/21 0020 03/07/21 2633 03/07/21 5352 03/07/21 2624 BP: (!) 146/73 138/74  (!) 158/83 Pulse: 62 (!) 57  (!) 58 Resp: 20 20 21 Temp: 97.7 °F (36.5 °C) 97.7 °F (36.5 °C)  97.7 °F (36.5 °C) SpO2: 93% 90% 97% 97% Weight:      
Height:      
 
 
Intake and Output:  
03/05 1901 - 03/07 0700 In: 201 [P.O.:700] Out: 725 [Urine:725] 03/07 0701 - 03/07 1900 In: 46 [P.O.:237] Out: - Physical Exam: Constitutional:  Awake and alert. Wearing PMV and voice fairly strong. Conversation appropriate. HEENT:  Sclera clear, pupils equal, oral mucosa moist.   
RESPIRATORY:  Clear bilaterally from anterior - crackles from posterior bilaterally CARDIOVASCULAR:  RRR with no M,G,R 
ABDOMEN/GI:  soft and with no tenderness; positive bowel sounds present EXTREMITIES:  warm with no cyanosis, no lower leg edema SKIN:  no jaundice or ecchymosis NEURO: awake and alert. Seems appropriate MUSCULOSKELETAL: moves all four extremities. No deformities ROS:  
Review of Systems Constitutional: Positive for malaise/fatigue. Respiratory: Positive for shortness of breath (with exertion). Negative for hemoptysis and sputum production. Cardiovascular: Negative for chest pain, palpitations, orthopnea and claudication. Gastrointestinal: Negative for heartburn and nausea. Neurological: Negative for dizziness and headaches. LINES:  PEG, #6 XLT Trach (PMV), peripheral IV, external female catheter CHEST XRAY:  None today LAB Recent Labs 03/05/21 
4605 WBC 13.1* HGB 9.9*  
HCT 30.5*  Recent Labs 03/05/21 
7195   
K 3.7  CO2 34* * BUN 65* CREA 1.19* CA 9.7 Assessment:  (Medical Decision Making) Hospital Problems  Date Reviewed: 2/5/2021 Codes Class Noted POA Cardiac arrest [facility]) ICD-10-CM: I46.9 ICD-9-CM: 427.5  12/30/2020 No  
   
 COVID-19 ICD-10-CM: U07.1 ICD-9-CM: 079.89  12/24/2020 Yes * (Principal) Acute respiratory distress syndrome (ARDS) due to severe acute respiratory syndrome coronavirus 2 (SARS-CoV-2) ([facility]) ICD-10-CM: U07.1, J80 
ICD-9-CM: 518.82, 079.89  12/24/2020 No  
   
 Acute hypoxemic respiratory failure ([facility] [address] ICD-10-CM: J96.01 
ICD-9-CM: 518.81  12/24/2020 Yes Acute on chronic renal failure (HCC) ICD-10-CM: N17.9, N18.9 ICD-9-CM: 584.9, 585.9  9/17/2019 Yes Coronary artery disease involving coronary bypass graft of native heart without angina pectoris (Chronic) ICD-10-CM: I80.550 ICD-9-CM: 414.05  6/15/2015 Yes Uncontrolled type 2 diabetes mellitus with hyperglycemia (HCC) (Chronic) ICD-10-CM: E11.65 ICD-9-CM: 250.02  6/15/2015 Yes Diabetes mellitus with hyperosmolarity without hyperglycemic hyperosmolar nonketotic coma (HCC) (Chronic) ICD-10-CM: E11.00 ICD-9-CM: 250.20  11/24/2014 Yes Plan:  (Medical Decision Making) --CXR for Monday 
--repeat labs on Monday ordered  
--Off vent for greater than 48 hours. Tolerating PMV with low flow oxygen Likely can Downsize trach today (was just uncuffed yesterday, does not seem to have been changed )and work towards capping/decannulation. --cont working with ST, has PEG but on mechanical soft diet with thin liquids. Off TF 
--cont PT 
--transfer to [facility] pending, hopefully bed placement beginning of next week 
--DNR  Will leave her a DNR but otherwise provide aggressive care since she is doing so much better. --cont Protonix/Low dose heparin for DVT prophylaxis - mild gastritis noted with EGD. Hemoglobin stable. --cont lantus and SSI, SQBS in 90s-120s 
--cont with Noted hypertension - Cozaar and Lopressor restarted 3/4 (home meds). Sherice León MD 
 
More than 50% of time documented was spent face-to-face contact with the patient and in the care of the patient on the floor/unit where the patient is located. I have spoken with and examined the patient. I agree with the above assessment and plan as documented.

## 2021-03-07 NOTE — PROGRESS NOTES
No urine output for this shift. Bladder scan shows 650 cc. Dr Maxi Hutchins made aware via perfect serve. Principal Discharge DX:	Non-convulsive seizure disorder with status epilepticus  Secondary Diagnosis:	Retroperitoneal bleed  Secondary Diagnosis:	ESRD (end stage renal disease) on dialysis  Secondary Diagnosis:	Dissection of aorta, thoracic  Secondary Diagnosis:	Aortic aneurysm Principal Discharge DX:	Non-convulsive seizure disorder with status epilepticus  Goal:	to follow up with the [facility] neurology clinic within one week of discharge  Instructions for follow-up, activity and diet:	You were found to have non-convulsive seizure disorder with status epilepticus. You were seen by the house neurology team who recommended Depakote. Please take all of your medications as prescribed for the duration of their course. Please follow up with the neurology clinic at [facility] (address below) within one week of discharge.  Secondary Diagnosis:	Retroperitoneal bleed  Goal:	to follow up with Dr. Jensen and Dr. Meneses within one week of discharge  Instructions for follow-up, activity and diet:	You were found to have an retroperitoneal bleed this admission. You had blood transfused, however no surgery was performed. Please follow up with Dr. Jensen and Dr. Meneses after discharge about this conditon.  Secondary Diagnosis:	ESRD (end stage renal disease) on dialysis  Goal:	to follow up with Dr. Meneses within one week of discharge  Instructions for follow-up, activity and diet:	You have ESRD requiring dialysis. You were seen by Dr. Meneses while in the hospital. You had a permacath placed which will allow you to receive dialysis until your fistula is ready. Please follow up with Dr. Meneses within one week of discharge.  Secondary Diagnosis:	Dissection of aorta, thoracic  Secondary Diagnosis:	Aortic aneurysm

## 2021-03-07 NOTE — PROGRESS NOTES
Unable to get oral or axillary temp. Rectal temp 93.9 at this time. Dr. Mariola Garcia made via perfect serve.

## 2021-03-07 NOTE — PROGRESS NOTES
Pt resting in bed. Pt will open eyes when spoken to and then quickly goes back to sleep. Trach and TC in place. shauna hugger remains in place. Door open will monitor.

## 2021-03-08 NOTE — PROGRESS NOTES
Called to room by MELITON  [place] daughter has removed trach collar   Patient saturation is 87%  When I replace collar she screams \"she don't want it. \"  Attempt to explain need for oxygen and grand daughter starts screaming at me  When I reached for phone to call code Alphonse Vasquez she says she is leaving  She does leave

## 2021-03-08 NOTE — PROGRESS NOTES
This RN straight cathed the patient using sterile technique. 100 mL of yellow cloudy urine was drained from the patient's bladder. Oncoming RN aware of the above.

## 2021-03-08 NOTE — PROGRESS NOTES
Daughter is back at bedside  Notified of incident with grand daughter   Daughter says she doesn't want grand daughter to be allowed in room.

## 2021-03-08 NOTE — PROGRESS NOTES
Report given to [employer]. Patient resting quietly in bed. Respirations present, even and unlabored on 7L via #4 trach in place with PMV and TC at 28%, spare trach and inner cannulas at bedside. Bed low and locked, bed alarm on, safety measures in place. No signs of distress, no needs expressed by the patient. PEG tube in place and patent. Call light within reach, encouraged patient to call with any needs.

## 2021-03-08 NOTE — PROGRESS NOTES
good visit with the patient and her daughter  
patient was able to open her eyes  
she did not verbalize  during visit 
daughter was calm 
prayer offered  
they were thankful for our support

## 2021-03-08 NOTE — PROGRESS NOTES
Oriented to person and place  Denies pain  Speech is clear but slow  Hob elevated  #4 shiley centered with 28% trach collar  Oxygen saturation 92 %  Peg tube intact and clamped  0 residual   Bilateral scd in place and functioning properly  patien is positioned on right side

## 2021-03-08 NOTE — PROGRESS NOTES
TRANSFER - OUT REPORT: 
 
Verbal report given to Mary(name) on Derrick Gomez  being admitted to [facility](unit) for routine progression of care Report consisted of patients Situation, Background, Assessment and  
Recommendations(SBAR). Information from the following report(s) SBAR, Kardex, [facility] and Recent Results was reviewed with the receiving nurse. Opportunity for questions and clarification was provided. Patient transported with: 
 28% trach collar

## 2021-03-08 NOTE — PROGRESS NOTES
[address] daughter at bedside  She is asking everyone who moved the bear  She is agitated and demanding  Asks questions about patients condition but when I start to answer she says \"I don't want to hear it. \"  She is very inappropriate  Expresses anger at physical therapist   Very inappropriate

## 2021-03-08 NOTE — PROGRESS NOTES
ACUTE PHYSICAL THERAPY GOALS: 
(Developed with and agreed upon by patient and/or caregiver.) .  Ms. Gaston Manual will perform rolling left and right with mod assist of 1 in 7 days. 2.  Ms. Gaston Manual will perform supine to sit with mod assist of 1 in 7 days. 3.  Ms. Gaston Manual will perform active range of motion bilateral lower extremities x 20 reps in supine and sitting in 7 days. 4.  Ms. Gaston Manual will perform sitting edge of bed unsupported and performing dynamic activity x >15 minutes. 5.  Ms. Gaston Manual will perform bed to chair with mod assist in 7 days.  
  
PHYSICAL THERAPY: Daily Note and PM Treatment Day # 4 Classie Cockayne is a 78 y.o. female PRIMARY DIAGNOSIS: Acute respiratory distress syndrome (ARDS) due to severe acute respiratory syndrome coronavirus 2 (SARS-CoV-2) ([facility]) Acute respiratory distress syndrome (ARDS) due to severe acute respiratory syndrome coronavirus 2 (SARS-CoV-2) ([facility] [U07.1, J80] COVID-19 [U07.1] Procedure(s) (LRB): PERCUTANEOUS ENDOSCOPIC GASTROSTOMY TUBE INSERTION/ 25/ 329 (N/A) ESOPHAGOGASTRODUODENOSCOPY (EGD) (N/A) 6 Days Post-Op ASSESSMENT:  
 
REHAB RECOMMENDATIONS: CURRENT LEVEL OF FUNCTION: 
(Most Recently Demonstrated) Recommendation to date pending progress: 
Setting: 
? Short-term Rehab Equipment: ? To Be Determined Bed Mobility: 
? Maximal Assistance Sit to Stand: 
? Unable to perform Transfers: ? Total Assistance Gait/Mobility: 
? Unable to perform ASSESSMENT: 
 Ms. Tahir Jansen presents sitting up in the bed asleep. She was easily aroused and participated in therapy. She currently has a PMV and can talk. She answered some yes/no questions and then had comments to make for herself. She smiled and appeared pleased with being able to communicate her needs better. Half way through the treatment her granddaughter came into the room. She had numerous demands related to the patient and sat in a chair telling the PT and PCT what is needed to be done. She was intrusive and not always appropriate. The patient only rolled her eyes and made negative faces without saying anything related to the granddaughters comments. Patient sat on the EOB wit min/mod assist for ~ 6 minutes working on balance and mid-line orientation. The patient indicated she wants to move and get up but more assistance is needed to allow her to try. She was returned sit to supine with max assist.  She was noted to have a BM so the PCT came in to assist PT in cleaning and changing the patient practicing rolling right/left with mod assist.  She was then positioned for comfort in the bed with her granddaughter at the bedside. PT will continue to treat and progress as indicated. SUBJECTIVE:  
Ms. Tahir Jansen states, Ana Bee are you going? \" SOCIAL HISTORY/ LIVING ENVIRONMENT:  
Pt was previously living with her daughter in a trailer and independent with all ADLs. OBJECTIVE:  
 
PAIN: VITAL SIGNS: LINES/DRAINS:  
Pre Treatment: Pain Screen Pain Scale 1: Numeric (0 - 10) Pain Intensity 1: 0 Post Treatment: 0/10 Visit Vitals BP (!) 109/59 Pulse 82 Temp 97.9 °F (36.6 °C) Resp 20 Ht 5' 5\" (1.651 m) Wt 63.6 kg (140 lb 3.2 oz) SpO2 (!) 89% Comment: Rn notified BMI 23.33 kg/m² PMV in place O2 Device: Tracheal collar MOBILITY: I Mod I S SBA CGA Min Mod Max Total  NT x2 Comments:  
Bed Mobility Rolling [] [] [] [] [] [] [] [x] [] [] [] Supine to Sit [] [] [] [] [] [] [] [x] [] [] [] Scooting [] [] [] [] [] [] [] [x] [] [] [] Sit to Supine [] [] [] [] [] [] [] [x] [] [] []   
Transfers Sit to Stand [] [] [] [] [] [] [] [] [] [x] [] Bed to Chair [] [] [] [] [] [] [] [] [] [x] [] Stand to Sit [] [] [] [] [] [] [] [] [] [x] [] I=Independent, Mod I=Modified Independent, S=Supervision, SBA=Standby Assistance, CGA=Contact Guard Assistance,  
Min=Minimal Assistance, Mod=Moderate Assistance, Max=Maximal Assistance, Total=Total Assistance, NT=Not Tested BALANCE: Good Fair+ Fair Fair- Poor NT Comments Sitting Static [] [] [] [] [x] [] Sitting Dynamic [] [] [] [] [x] [] Standing Static [] [] [] [] [] [x] Standing Dynamic [] [] [] [] [] [x] GAIT: I Mod I S SBA CGA Min Mod Max Total  NT x2 Comments:  
Level of Assistance [] [] [] [] [] [] [] [] [] [x] [] Distance 0 DME N/A Gait Quality 0 Weightbearing Status N/A I=Independent, Mod I=Modified Independent, S=Supervision, SBA=Standby Assistance, CGA=Contact Guard Assistance,  
Min=Minimal Assistance, Mod=Moderate Assistance, Max=Maximal Assistance, Total=Total Assistance, NT=Not Tested PLAN:  
FREQUENCY/DURATION: PT Plan of Care: 3 times/week for duration of hospital stay or until stated goals are met, whichever comes first. 
TREATMENT:  
 
TREATMENT:  
($$ Therapeutic Activity: 23-37 mins    ) Therapeutic Activity (30 Minutes): Therapeutic activity included Rolling, Supine to Sit, Sit to Supine, Scooting and Sitting balance  to improve functional Mobility, Strength, ROM and Activity tolerance. TREATMENT GRID: 
 Date: 
3/8 Date: 
 Date: Activity/Exercise Parameters Parameters Parameters AA seated Knee extension 4 x 5 PROM to BLE heel cords AA hip flexion in supine 2 x 5    
     
     
     
     
 
AFTER TREATMENT POSITION/PRECAUTIONS: 
Bed, Needs within reach, RN notified and family at the bedside INTERDISCIPLINARY COLLABORATION: 
RN/PCT 
 
TOTAL TREATMENT DURATION: 
PT Patient Time In/Time Out Time In: 1534 Time Out: 1195 Tu Iniguez [place]

## 2021-03-08 NOTE — PROGRESS NOTES
MSN, CM:  Spoke with family this AM about discharge planning. Patient is currently on 7L via trach collar. Patient also has a PEG but is allowed to eat mechanical soft diet. Spoke with Lucy Tenorio from [place] today and the discharge plan is still to [address] [place] [address] stated she may be able to take today. Case Management will continue to follow.

## 2021-03-08 NOTE — PROGRESS NOTES
Report received from [facility]. Patient resting quietly in bed. Respirations present, even and unlabored with #4 trach in place with PMV and TC at 28%, spare trach and inner cannulas at bedside. Bed low and locked, bed alarm on, safety measures in place. No signs of distress, no needs expressed. Bear hugger in place, as well as bilateral SCDs. PEG tube in place and patent. Call light within reach, encouraged patient to call with any needs. Will continue to monitor.

## 2021-03-08 NOTE — PROGRESS NOTES
STG: Patient will tolerate continual speaking valve wear without respiratory decline. MET 3/8/21 STG: Patient will demonstrate independence with doning and doffing speaking valve. Discontinued 3/8/21 LTG: Patient will tolerate passy bill speaking valve to communicate wants and needs without respiratory decline. MET 3/8/21 STG: Patient will participate in dysphagia assessment to determine appropriateness and safety with po intake. MET 3/4/21 LTG: Patient will tolerate least restrictive diet without overt signs or symptoms of airway compromise. MET 3/4/21 STG: Patient will participate in modified barium swallow study to objectively assess swallow function. MET 3/4/21 STG: Patient will consume mechanical soft diet and thin liquids without overt signs or symptoms of respiratory compromise. ADDED 3/4/21 SPEECH LANGUAGE PATHOLOGY: PASSY BILL SPEAKING VALVE AND DYSPHAGIA- Daily Note  1 and [facility] NAME/AGE/GENDER: Taran Reid is a 78 y.o. female DATE: 3/8/2021 PRIMARY DIAGNOSIS: Acute respiratory distress syndrome (ARDS) due to severe acute respiratory syndrome coronavirus 2 (SARS-CoV-2) ([facility] [U07.1, J80] COVID-19 [U07.1] Procedure(s) (LRB): PERCUTANEOUS ENDOSCOPIC GASTROSTOMY TUBE INSERTION/ 25/ 329 (N/A) ESOPHAGOGASTRODUODENOSCOPY (EGD) (N/A) 6 Days Post-Op ICD-10: Treatment Diagnosis: R13.12 Dysphagia, Oropharyngeal Phase 
R49.1 Aphonia; Loss of voice RECOMMENDATIONS  
SPEAKING VALVE RECOMMENDATIONS: 
? During all waking hours Please ensure cuff is deflated prior to placement Remove if sleeping or if decline in respiratory status DIET:  
? PO:  Mechanical soft ? Liquids:  regular thin MEDICATIONS: With liquid 
 or floated in puree ASPIRATION PRECAUTIONS · Slow rate of intake · Small bites/sips · Upright at 90 degrees during meal 
  
COMPENSATORY STRATEGIES/MODIFICATIONS · None EDUCATION: 
· Recommendations discussed with Nursing · Family · Patient CONTINUATION OF SKILLED SERVICES/MEDICAL NECESSITY: 
? No further speech therapy needs clinically indicated at this time as patient is consuming least restrictive diet without overt s/sx airway compromise. Also tolerating continual wear of speaking valvule without respiratory decline. RECOMMENDATIONS for CONTINUED SPEECH THERAPY: No further speech therapy indicated at this time. ASSESSMENT Swallowing: Patient with no aspiration/penetration on any consistencies on modified barium swallow study 3/4/21. Consuming mechanical soft diet and thin liquids without overt s/sx airway compromise. Mildly prolonged oral prep time with chewables, but functional given increased time . Recommend continue mechanical soft diet/thin liquids. Meds with liquid rinse. Dysphagia goals met and discontinued. Speaking valve Tolerance: Not formally adressed. Patient continues to tolerate continual wear of PMV without respiratory decline. Goals met and discontinued 
 
continual speaking valve wear during all waking hours - Ensure cuff is deflated prior to pmv placement - Removed valve is increased secretions or decline in respiratory status. - remove if patient is sleeping REHABILITATION POTENTIAL FOR STATED GOALS: Good PLAN   
FREQUENCY/DURATION: No further speech therapy indicated at this time as patient is consuming least restrictive diet without overt s/sx airway compromise. - Recommendations for next treatment session: No additional speech therapy indicated at this time. SUBJECTIVE Alert upright in bed. PMV in place. Remains on trach collar. Limited verbalizations today. Follows 1 step commands. Responds to questions with encouragement. Daughter at bedside. History of Present Injury/Illness: Ms. Buzz Becerra  has a past medical history of Acute cystitis without hematuria (1/30/2019), Acute pancreatitis (8/12/2019), CAD (coronary artery disease), Diabetic ketoacidosis ([facility] (3/8/2020), DM2 (diabetes mellitus, type 2) ([facility] Elevated liver function tests (8/8/2019), Encephalopathy (2/7/2021), Gout, Gram-negative bacteremia (8/9/2019), HLD (hyperlipidemia), HTN (hypertension), Peripheral neuropathy, and Right lower quadrant abdominal pain (8/8/2019). . She also  has a past surgical history that includes hx tubal ligation; hx coronary artery bypass graft; pr cardiac surg procedure unlist; hx cholecystectomy; and ir insert non tunl cvc over 5 yrs (1/11/2021). Problem List:  (Impairments causing functional limitations): 1. Aphonia due to trach Previous Dysphagia: NONE REPORTED Diet Prior to Evaluation: PEG 3/2/21 Orientation:  
Person Disoriented to time and situation Pain: Pain Scale 1: Numeric (0 - 10) Pain Intensity 1: 0 OBJECTIVE Swallow treatment: 
 Patient seen for diet tolerance. Consuming mechanical soft consistency lunch meal items and thin liquids by straw/serial sips without overt s/sx airway compromise. Mildly prolonged oral prep time with chewables; however, adequate oral clearance achieved given increased time. Speaking Valve Tolerance: Not addressed. Continues to tolerate continual wear of PMV without respiratory decline. INTERDISCIPLINARY COLLABORATION: Registered Nurse and Respiratory Therapist 
PRECAUTIONS/ALLERGIES: Sulfa (sulfonamide antibiotics) Tool Used: Dysphagia Outcome and Severity Scale (ELKE) Score Comments Normal Diet  [] 7 With no strategies or extra time needed Functional Swallow  [] 6 May have mild oral or pharyngeal delay Mild Dysphagia  [] 5 Which may require one diet consistency restricted Mild-Moderate Dysphagia  [] 4 With 1-2 diet consistencies restricted Moderate Dysphagia  [] 3 With 2 or more diet consistencies restricted Moderate-Severe Dysphagia  [] 2 With partial PO strategies (trials with ST only) Severe Dysphagia  [] 1 With inability to tolerate any PO safely Score:  Initial: 2 Most Recent: 5 (Date 03/08/21 ) Interpretation of Tool: The Dysphagia Outcome and Severity Scale (ELKE) is a simple, easy-to-use, 7-point scale developed to systematically rate the functional severity of dysphagia based on objective assessment and make recommendations for diet level, independence level, and type of nutrition. Tool Used: MODIFIED YESSY SCALE (mRS) Score No Symptoms  [] 0 No significant disability despite symptoms; able to carry out all usual duties and activities  [] 1 Slight disability; unable to carry out all previous activities but able to look after own affairs without assistance. [] 2 Moderate disability; requiring some help but able to walk without assistance  [] 3 Moderately severe disability; unable to walk without assistance and unable to attend to own bodily needs without assistance  [] 4 Severe disability; bedridden, incontinent, and requiring constant nursing care and attention  [] 5 Score:  Initial: 4 Most recent: 4 Interpretation of Tool: The Modified [place] Scale is a 7-point scaled used to quantify level of disability as it relates to a patient's functional abilities. Current Medications: No current facility-administered medications on file prior to encounter. Current Outpatient Medications on File Prior to Encounter Medication Sig Dispense Refill  atorvastatin (LIPITOR) 40 mg tablet Take 40 mg by mouth.  aspirin delayed-release 81 mg tablet Take  by mouth daily.  metoprolol tartrate (LOPRESSOR) 25 mg tablet Take 0.5 Tabs by mouth every twelve (12) hours. 60 Tab 1  
 insulin lispro (HUMALOG) 100 unit/mL injection 15 Units by SubCUTAneous route Before breakfast, lunch, and dinner.  1 Vial 0  
  Insulin Needles, Disposable, 31 gauge x 5/16\" ndle by SubCUTAneous route Before breakfast, lunch, dinner and at bedtime. 1 Package 11  
 clopidogrel (PLAVIX) 75 mg tab Take 75 mg by mouth.  insulin glargine (LANTUS) 100 unit/mL injection 50 Units by SubCUTAneous route nightly. 1 Vial 0  Blood-Glucose Meter (ONETOUCH ULTRAMINI) monitoring kit Use as directed 1 Kit 0  
 glucose blood VI test strips (ONETOUCH ULTRA TEST) strip Test 4 times daily as directed 100 Strip 11  Lancets (ONETOUCH ULTRASOFT LANCETS) misc Test 4 times daily as directed 100 Each 11  
 losartan (COZAAR) 100 mg tablet Take  by mouth daily.  rosuvastatin (CRESTOR) 20 mg tablet Take 1 Tab by mouth nightly. 90 Tab 1  
 gabapentin (NEURONTIN) 100 mg capsule Take 1 Cap by mouth three (3) times daily. (Patient taking differently: Take 100 mg by mouth two (2) times a day.) 30 Cap 5  
 rOPINIRole (REQUIP) 0.5 mg tablet Take 1 Tab by mouth nightly as needed. 90 Tab 1 SAFETY: 
After treatment position/precautions: · Upright in bed · RN notified · Daughter at bedside Total Treatment Duration:  
Time In: 8526 Time Out: 1238 Padmini Bob MS, CCC-SLP

## 2021-03-08 NOTE — PROGRESS NOTES
Spiritual Care Visit, follow up visit. Visited with patient at bedside. Patient continues to remain about the same. Is awake, but did not speak while this  was present. Prayed for patient's healing and health. She continues to remain DNR. Visit by Litzy Gutierrez, Staff .  M.Ed., Th.B., B.A.

## 2021-03-08 NOTE — PROGRESS NOTES
When sleeping, the patient tends to slide to the right side of the bed with her head resting on the side rail. Lap belt put on the patient for patient's safety from falls. Will continue to monitor.

## 2021-03-08 NOTE — PROGRESS NOTES
Patient is sleeping  Oxygen at 28% via trach collar  PMV is off and in bedside container   Peg intact  Patient has not voided   Lasix was stopped per Dr Vahid Gutierrez

## 2021-03-08 NOTE — PROGRESS NOTES
Spoke with charge nurse at [facility], pt assigned room 422 for today. Report called  To gertrude story at [facility]. Perfect serve message to [facility]  re new assignment and need d/c order.

## 2021-03-08 NOTE — PROGRESS NOTES
Shereen Garcia Admission Date: 12/24/2020 Daily Progress Note: 3/8/2021 The patient's chart is reviewed and the patient is discussed with the staff. 79 yo female admitted 12/24 with acute respiratory failure secondary to COVID 19.  Pt was treated with Decadron and convalescent plasma. She had PRATIMA and was not treated with Remdesivir.  She was initially on BiPAP but pt had a  cardiac arrest on 12/30 requiring intubation.  She required prolonged vent support so a trach was placed on 1/19. Pt has been treated for an E coli UTI as well as MRSA in her sputum. She developed acute on chronic renal failure and started on dialysis on 1/12 but now off of since 1/25 and there are no plans to restart. Albert Burnham consulted for PEG but this on hold pending family discussion regarding need for long term care.  Pt was seen by Neurology with an abnormal EEG with moderate to severe slowing. Brain MRI 1/26 revealed stable age-related senescent changes and chronic microvascular disease with remote right posterior temporal occipital ischemic insult. No evidence anoxic brain injury per neuro - deep encephalopathy. Have gradually been able to reduce sedatives. Now tolerating T collar trials and felt to be able to follow some commands. PEG placed 3/2. Now on mechanical soft diet with thin liquids. Off vent since 3/3. Has been approved for [facility] - will probably move there the week of 3/8. Subjective:  
 
Patient with PMV in place. Taking PO well. Very weak voice. Able to tell me her name and that she is in [facility] On 28% trach collar. Current Facility-Administered Medications Medication Dose Route Frequency  albuterol (PROVENTIL VENTOLIN) nebulizer solution 2.5 mg  2.5 mg Nebulization TID RT  
 clopidogreL (PLAVIX) tablet 75 mg  75 mg Oral DAILY  insulin regular (NOVOLIN R, HUMULIN R) injection   SubCUTAneous AC&HS  pantoprazole (PROTONIX) tablet 40 mg  40 mg Oral ACB  losartan (COZAAR) tablet 100 mg  100 mg Oral DAILY  rosuvastatin (CRESTOR) tablet 20 mg  20 mg Oral QHS  metoprolol tartrate (LOPRESSOR) tablet 25 mg  25 mg Oral Q12H  
 Saccharomyces boulardii (FLORASTOR) capsule 250 mg  250 mg Oral BID  docusate (COLACE) 50 mg/5 mL oral liquid 100 mg  100 mg Oral DAILY PRN  
 loperamide (IMODIUM) 1 mg/7.5 mL oral solution 2 mg  2 mg Oral QID PRN  
 traZODone (DESYREL) tablet 50 mg  50 mg Oral QHS PRN  
 diclofenac (VOLTAREN) 1 % topical gel 2 g  2 g Topical Q6H PRN  
 insulin glargine (LANTUS) injection 20 Units  20 Units SubCUTAneous DAILY  hydrALAZINE (APRESOLINE) 20 mg/mL injection 10 mg  10 mg IntraVENous Q6H PRN  
 heparin (porcine) 1,000 unit/mL injection 5,000 Units  5,000 Units IntraVENous DIALYSIS PRN  
 alcohol 62% (NOZIN) nasal  1 Ampule  1 Ampule Topical Q12H  
 heparin (porcine) injection 5,000 Units  5,000 Units SubCUTAneous Q8H  
 NUTRITIONAL SUPPORT ELECTROLYTE PRN ORDERS   Does Not Apply PRN  
 dextrose 40% (GLUTOSE) oral gel 1 Tube  15 g Oral PRN  
 glucagon (GLUCAGEN) injection 1 mg  1 mg IntraMUSCular PRN  
 dextrose (D50W) injection syrg 12.5-25 g  25-50 mL IntraVENous PRN  
 lip protectant (BLISTEX) ointment 1 Each  1 Each Topical PRN  
 acetaminophen (TYLENOL) tablet 650 mg  650 mg Oral Q6H PRN  
 sodium chloride (NS) flush 5-40 mL  5-40 mL IntraVENous Q8H Review of Systems Unobtainable due to patient status. Objective:  
 
Vitals:  
 03/08/21 0700 03/08/21 0758 03/08/21 0814 03/08/21 1120 BP:  112/61  (!) 109/59 Pulse:  72  72 Resp:  20  20 Temp:  98.3 °F (36.8 °C)  97.8 °F (36.6 °C) SpO2: 92% 90% 96% 90% Weight:      
Height:      
 
Intake and Output:  
03/06 1901 - 03/08 0700 In: 2227 [P.O.:237] Out: 1000 [Urine:1000] No intake/output data recorded. Physical Exam:  
Constitution:  the patient is well developed and in no acute distress EENMT:  Sclera clear, pupils equal, oral mucosa moist 
 Respiratory: Trach in place. B inspiratory crackles 
Cardiovascular:  RRR without M,G,R 
Gastrointestinal: soft and non-tender; with positive bowel sounds. 
Musculoskeletal: warm without cyanosis. There is no lower leg edema. 
Skin:  no jaundice or rashes, no wounds  
Neurologic: no gross neuro deficits. Very weak and debilitated, however.  
Psychiatric:  alert and oriented x person, place 
 
CHEST XRAY:  
CXR Results  (Last 48 hours)  
 None  
  
 
 
 
LAB 
No lab exists for component: GLPOC  
Recent Labs  
  03/08/21 
0732  
WBC 12.3*  
HGB 9.6*  
HCT 30.2*  
  
 
Recent Labs  
  03/08/21 
0732  
  
K 3.5  
  
CO2 31  
*  
BUN 79*  
CREA 1.49*  
CA 9.2  
 
ABG:  No results found for: PH, PHI, PCO2, PCO2I, PO2, PO2I, HCO3, HCO3I, FIO2, FIO2I 
 
 
MICRO 
 
SARS-CoV-2 LAB Results 
[facility] Test: No results found for: COV2NT  
[facility] Test: No results found for: EDPR 
[facility] Test: No components found for: [id number]  
 
 
 
 
Assessment:  (Medical Decision Making)  
 
Hospital Problems  Date Reviewed: 2/5/2021  
       Codes Class Noted POA  
 Cardiac arrest (HCC) ICD-10-CM: I46.9 
ICD-9-CM: 427.5  12/30/2020 No  
   
 COVID-19 ICD-10-CM: U07.1 
ICD-9-CM: 079.89  12/24/2020 Yes  
   
 * (Principal) Acute respiratory distress syndrome (ARDS) due to severe acute respiratory syndrome coronavirus 2 (SARS-CoV-2) (HCC) ICD-10-CM: U07.1, J80 
ICD-9-CM: 518.82, 079.89  12/24/2020 No  
   
 Acute hypoxemic respiratory failure (HCC) ICD-10-CM: J96.01 
ICD-9-CM: 518.81  12/24/2020 Yes  
   
 Acute on chronic renal failure (HCC) ICD-10-CM: N17.9, N18.9 
ICD-9-CM: 584.9, 585.9  9/17/2019 Yes  
   
 Coronary artery disease involving coronary bypass graft of native heart without angina pectoris (Chronic) ICD-10-CM: I25.810 
ICD-9-CM: 414.05  6/15/2015 Yes  
   
 Uncontrolled type 2 diabetes mellitus with hyperglycemia (HCC) (Chronic) ICD-10-CM: E11.65 
ICD-9-CM: 250.02  6/15/2015 Yes  
   
 Diabetes mellitus with hyperosmolarity without hyperglycemic hyperosmolar nonketotic coma (HCC) (Chronic) ICD-10-CM: E11.00 ICD-9-CM: 250.20  11/24/2014 Yes Patient with prolonged recovery from [place]. S/p trach. Very difficult to wean. Encephalopathy compounding her issues. Now, however, off vent, tolerating trach collar. Beginning to wake up and follow commands. Plan:  (Medical Decision Making)  
-cont PMV, po intake, minimal O2 through trach collar.  
-likely to [facility] today or tomorrow for ongoing therapy, work on her resultant debility 
-low UOP, creatinine mildly elevated. Bladder scan without evidence of retention. Will stop lasix and allow her volume status to drift up.  
-cont lantus, ssi.  
-not transferring to hospitalist service as she is likely to [facility] today 
-DNR.   
 
 
Liseth Ervin MD

## 2021-03-08 NOTE — PROGRESS NOTES
Patient has not voided the entire shift. This RN along with Ricci Alvarenga RN bladder scanned the patient. There was 198mL of urine in the patient's bladder. Dr. Shazia Marquez made aware of the above via perfect serve. New orders received to straight cath the patient. Will straight cath and continue to monitor.

## 2021-03-23 NOTE — PROGRESS NOTES
Spiritual Care Visit, initial visit attempted. Attempted to visit with patient at bedside. Patient was in hallway doing therapy. Left a card and prayed for patient's healing and health. Visit by Kayla Dos Santos, Staff .  Aaron, Odalys.B., B.A.

## 2021-04-16 PROBLEM — I50.9 CHF EXACERBATION (HCC): Status: ACTIVE | Noted: 2021-01-01

## 2021-04-16 NOTE — ACP (ADVANCE CARE PLANNING)
Advance care planninginitial encounter Face to face time - 18 minutes face-to-face time spent discussion the care Pt's decision making capacity  
[] Yes [x ] NO Names of POA/surrogate decision maker when patient is altered 
:Simeon Other members present in the meeting : none Patient / surrogate decision-maker ultimately chose. .. [] Full code- full aggressive medical and surgical interventions, including intubations, resuscitations, pressors, artificial tube feeding [ ] DO NOT RESUSCITATE -okay to intubate,  and other aggressive medical and surgical intervention [x ] Not resuscitate, DO NOT INTUBATE, but okay for other aggressive medical and surgical intervention [ ] DNR/DNI, hospice, comfort focus care to maximize patient's quality of life [ ] DNR/DNI, strict comfort care ONLY Further discussion regarding principle disease process. prognosis, plans of care, and treatment goals 
: Extensive discussion regarding patient's goals of care, therapeutic treatment options, POA wished patient to remain DNR/DNI, consistent with last hospital course aggressive medical treatments, goals to return to prior baseline able to talk and eat

## 2021-04-16 NOTE — ED NOTES
TRANSFER - OUT REPORT: 
 
Verbal report given to Yolanda Ma (name) on [place] [facility]  being transferred to 80(unit) for routine progression of care Report consisted of patients Situation, Background, Assessment and  
Recommendations(SBAR). Information from the following report(s) SBAR was reviewed with the receiving nurse. Lines:  
Peripheral IV 04/16/21 Left Antecubital (Active) Site Assessment Clean, dry, & intact 04/16/21 1132 Phlebitis Assessment 0 04/16/21 1132 Infiltration Assessment 0 04/16/21 1132 Dressing Status Clean, dry, & intact 04/16/21 1132 Peripheral IV 04/16/21 Right Antecubital (Active) Site Assessment Clean, dry, & intact 04/16/21 1212 Phlebitis Assessment 0 04/16/21 1212 Infiltration Assessment 0 04/16/21 1212 Dressing Status Clean, dry, & intact 04/16/21 1212 Opportunity for questions and clarification was provided. Patient transported with: 
 [employer]

## 2021-04-16 NOTE — H&P
[facility] Service History and Physical 
 
Patient ID: 
Allison Calix 
female 1940 
[id number] Admission Date: 4/16/2021 Chief Complaint: Altered mental status and shortness of breath Reason for Admission: Pulmonary edema and metabolic encephalopathy ASSESSMENT & PLAN: 
 
Acute on chronic respiratory failure with hypoxia and hypercapniaABG showing decompensation respiratory acidosis, will start BiPAP Metabolic's encephalopathy possibly secondary to hypoxia, versus atypical pneumonia, awaiting work-up, Acute on chronic congestive heart failure exacerbation with preserved EF on echocardiogram 2020  
-Given PRATIMA on CKD 4, will start albumin for intravascular support, IV Lasix, chronic kidney, goal net negative -1 to 2 L a day Pulmonary edemaheart failure versus atypical pneumonia, follow CT chest, initial procalcitonin negative, cultures pending including pneumo antigens and mycoplasma, continue Rocephin and doxycycline for empiric coverage, de-escalate if indicated Acute on chronic renal failurewith history of transient hemodialysis 3 last hospital stay, hold home losartan's, monitor closely while diuresis, avoid nephrotoxic agents Anemiachronic, can change from prior, will repeat iron and vitamin screening, replace if needed History of coronary artery disease status post bypass graftresume home aspirin and Plavix, monitor clinically History of diabetes type 2 with circulatory complications as aboveborderline hypoischemic on admissions, will reduce home dose Lantus and insulin, will adjust as needed for blood glucose less than 180 History of COVID-19 infection and ARDS requiring intubations -monitor clinically Disposition: inpatietnt Diet: cardiac  
VTE ppx: heparin GI ppx: pepcid CODE STATUS: DNR/DNI Surrogate decision-maker: daughter Pedro Ortiz HISTORY OF PRESENT ILLNESS: 
Patient is a 78 y.o. female with medical h/o 3-month hospitalization for [id number] pneumonia complicated with ARDS, intubations, acute kidney injury on chronic requiring dialysis, baseline diabetes CKD CAD status post bypass graft Patient was discharged to long-term acute care in March, and later discharged to facility for rehab, patient was found by daughter to be more altered, onset 1 week ago, slowly progressive, unable to formulate speech clearly at times, episodic, also with \"shaking episodes\" daughter was told that patient was treated for congestive heart failure exacerbation with oral Lasix, as well as antibiotics for urinary tract infection over the last week, patient continued to deteriorate, finally sent to emergency room today for altered mental status of aphasia and hypoxia History gathering from daughter, review of system limited given patient's altered mental status Allergies Allergen Reactions  Sulfa (Sulfonamide Antibiotics) Swelling Prior to Admission Medications Prescriptions Last Dose Informant Patient Reported? Taking? Blood-Glucose Meter (ONETOUCH ULTRAMINI) monitoring kit   No No  
Sig: Use as directed Insulin Needles, Disposable, 31 gauge x 5/16\" ndle   No No  
Sig: by SubCUTAneous route Before breakfast, lunch, dinner and at bedtime. Lancets (ONETOUCH ULTRASOFT LANCETS) misc   No No  
Sig: Test 4 times daily as directed  
aspirin delayed-release 81 mg tablet   Yes No  
Sig: Take  by mouth daily. atorvastatin (LIPITOR) 40 mg tablet   Yes No  
Sig: Take 40 mg by mouth. clopidogrel (PLAVIX) 75 mg tab   Yes No  
Sig: Take 75 mg by mouth.  
gabapentin (NEURONTIN) 100 mg capsule   No No  
Sig: Take 1 Cap by mouth three (3) times daily. Patient taking differently: Take 100 mg by mouth two (2) times a day. glucose blood VI test strips (ONETOUCH ULTRA TEST) strip   No No  
Sig: Test 4 times daily as directed  
insulin glargine (LANTUS) 100 unit/mL injection   No No  
Si Units by SubCUTAneous route nightly.   
insulin lispro (HUMALOG) 100 unit/mL injection   No No  
Sig: 15 Units by SubCUTAneous route Before breakfast, lunch, and dinner. losartan (COZAAR) 100 mg tablet   Yes No  
Sig: Take  by mouth daily. metoprolol tartrate (LOPRESSOR) 25 mg tablet   No No  
Sig: Take 0.5 Tabs by mouth every twelve (12) hours. rOPINIRole (REQUIP) 0.5 mg tablet   No No  
Sig: Take 1 Tab by mouth nightly as needed. rosuvastatin (CRESTOR) 20 mg tablet   No No  
Sig: Take 1 Tab by mouth nightly. Facility-Administered Medications: None Past Medical History:  
Diagnosis Date  Acute cystitis without hematuria 1/30/2019  Acute pancreatitis 8/12/2019  CAD (coronary artery disease)  Diabetic ketoacidosis ([facility] [address] 3/8/2020  DM2 (diabetes mellitus, type 2) ([facility] [address]  Elevated liver function tests 8/8/2019  Encephalopathy 2/7/2021  Gout  Gram-negative bacteremia 8/9/2019  HLD (hyperlipidemia)  HTN (hypertension)  Peripheral neuropathy  Right lower quadrant abdominal pain 8/8/2019 Past Surgical History:  
Procedure Laterality Date  HX CHOLECYSTECTOMY jennifer in 1964  HX CORONARY ARTERY BYPASS GRAFT    
 x3  
 HX TUBAL LIGATION    
 IR INSERT NON TUNL CVC OVER 5 YRS  1/11/2021  OK CARDIAC SURG PROCEDURE UNLIST    
 stents x 3 2009 Social History Tobacco Use  Smoking status: Never Smoker  Smokeless tobacco: Never Used Substance Use Topics  Alcohol use: Yes Comment: socially FAMILY HISTORY:   
Family History Problem Relation Age of Onset  Hypertension Mother  Cancer Mother  Diabetes Mother  Heart Disease Mother REVIEW OF SYSTEMS: 
 Unable as recent as above PHYSICAL EXAM: 
 
Visit Vitals BP (!) 148/66 Pulse (!) 53 Temp 97.5 °F (36.4 °C) Resp 20 Ht 5' 5\" (1.651 m) Wt 63.5 kg (140 lb) SpO2 92% BMI 23.30 kg/m² General: No acute distress,  no use of accessory muscles HEENT: Pupils equal and reactive to light and accommodation, oropharynx is clear Neck: Supple, no lymphadenopathy, no JVD Lungs: Diffuse crackles  
cardiovascular: Regular rate and rhythm with normal S1 and S2 Abdomen: Soft, nontender, nondistended, normoactive bowel sounds Extremities: No cyanosis clubbing or edema Neuro: Very weak, 2 out of 5 muscle strengths upper extremities, moves all extremities bilaterally with seemingly equal strength, GCS 13-14 able to mouth a few words, mostly communicated with nodding and shaking of head  
psych: Normal mood and affect Intake/Output last 3 shifts: 
Date 04/15/21 0700 - 04/16/21 0659(Not Admitted) 04/16/21 0700 - 04/17/21 2243 Shift [id number] 1900-0659 24 Hour Total 7103-6153 1321-1320 24 Hour Total  
INTAKE Shift Total(mL/kg) OUTPUT Urine    250  250 Urine Output (mL) (Urinary Catheter 04/16/21 [place])    250  250 Shift Total(mL/kg)    250(3.9)  250(3.9) NET    -250  -250 Weight (kg)    63.5 63.5 63.5 Labs: 
CMP:  
Lab Results Component Value Date/Time  04/16/2021 11:28 AM  
 K 6.0 (H) 04/16/2021 11:28 AM  
  (H) 04/16/2021 11:28 AM  
 CO2 25 04/16/2021 11:28 AM  
 AGAP 3 (L) 04/16/2021 11:28 AM  
 GLU 47 (L) 04/16/2021 11:28 AM  
 BUN 74 (H) 04/16/2021 11:28 AM  
 CREA 2.38 (H) 04/16/2021 11:28 AM  
 GFRAA 25 (L) 04/16/2021 11:28 AM  
 GFRNA 21 (L) 04/16/2021 11:28 AM  
 CA 9.1 04/16/2021 11:28 AM  
 MG 2.8 (H) 04/16/2021 11:28 AM  
 ALB 3.3 04/16/2021 11:28 AM  
 TBILI 0.3 04/16/2021 11:28 AM  
 TP 7.3 04/16/2021 11:28 AM  
 GLOB 4.0 (H) 04/16/2021 11:28 AM  
 AGRAT 0.8 (L) 04/16/2021 11:28 AM  
 ALT 39 04/16/2021 11:28 AM  
 
 
 
CBC:   
Lab Results Component Value Date/Time WBC 7.0 04/16/2021 11:28 AM  
 HGB 8.4 (L) 04/16/2021 11:28 AM  
 HCT 27.5 (L) 04/16/2021 11:28 AM  
  (L) 04/16/2021 11:28 AM  
 
 
Lab Results Component Value Date/Time  INR 0.9 03/11/2021 06:12 AM  
 INR 1.2 08/10/2019 03:41 AM  
 INR 1.2 08/08/2019 06:17 PM  
 Prothrombin time 12.4 (L) 03/11/2021 06:12 AM  
 Prothrombin time 15.4 (H) 08/10/2019 03:41 AM  
 Prothrombin time 14.9 (H) 08/08/2019 06:17 PM  
 
 
ABG:  No results found for: PH, PHI, PCO2, PCO2I, PO2, PO2I, HCO3, HCO3I, FIO2, FIO2I Lab Results Component Value Date/Time Troponin-I, Qt. <0.02 (L) 03/08/2020 01:58 PM  
 BNP 22 (H) 01/30/2019 10:32 AM  
 
 
 
Imaging & Other Studies: XR Results (maximum last 3): [place] [facility] [place] XR CHEST PORT Narrative Portable chest x-ray CLINICAL INDICATION: Shortness of breath and cough FINDINGS: Single AP view of the chest compared to a similar exam dated 3/8/2021 
shows worsening of bilateral diffuse reticular nodular interstitial densities 
throughout both lungs. There is no definite pleural effusion. Post CABG changes 
are noted. There is no pneumothorax. The bones are osteopenic. Impression Significantly worse bilateral pulmonary edema or atypical pneumonia 
pattern. [place] [facility] [place]08/21 XR CHEST SNGL V  
 Narrative AP PORTABLE CHEST X-RAY 3/8/2021 10:42 PM 
 
HISTORY: Resp failure  Resp failure COMPARISON: February 28, 2021 FINDINGS:  A tracheostomy tube is present in the midline. Median sternotomy 
wires are present. EKG leads are present. The lung volumes are diminished. Interstitial densities persist in the lung 
bases. There is no new consolidation or large pleural effusions. Impression Low lung volumes with stable lower lobe interstitial densities. [place] [facility] [place] on 12/24/20 XR SWALLOW FUNC VIDEO Narrative Modified barium swallow. CLINICAL INDICATION: Oral pharyngeal dysphagia PROCEDURE: The patient was administered various consistencies of liquid and 
solid barium by the speech pathologist. Direct fluoroscopic guidance was 
provided. Total fluoroscopic time: 1.22 minutes; zero images saved.  
 
FINDINGS: The patient was able to tolerate all forms of administered liquid and 
solid barium without penetration or aspiration. Impression Unremarkable modified barium swallow exam. No aspiration evident. For more detailed evaluation of the swallowing study please refer to the 
separately dictated speech pathologist report CT Results (maximum last 3): [facility] En[facility] encounter on 12/24/20 CT HEAD WO CONT Narrative Noncontrast head CT Clinical Indication: History of remote right occipital infarct now with severe 
acute severe generalized weakness, altered mental status, Covid-19 positive. Technique: Noncontrast axial images were obtained through the brain. All CT 
scans at this location are performed using dose modulation techniques as 
appropriate including the following: Automated exposure control, adjustment of 
the MA and/or kV according to patient's size, or use of iterative reconstruction 
technique. Reformatted coronal images obtained to further evaluate bones, soft 
tissues, extra-axial spaces. Comparison: 1/30/2019 CT and 2/2/2019 MRI Findings: There is no acute intracranial hemorrhage, hydrocephalus, intra-axial 
mass, or mass-effect. Chronic right occipital infarct is similar to prior. There 
are tiny hypodensities in the bilateral basal ganglia which are likely chronic 
lacunar infarcts and not definitely changed from priors, given technique and 
positioning. There is no CT evidence of acute large artery territorial 
infarction or abnormal extra-axial fluid collection. The mastoid air cells demonstrate bilateral effusions. Paranasal sinuses are 
clear where imaged. No displaced skull fractures are present. Impression Impression: No CT evidence of acute intracranial abnormality. Chronic changes. Results from Hospital Encounter encounter on 08/08/19 CT ABD PELV WO CONT Narrative CT ABDOMEN AND PELVIS WITHOUT INTRAVENOUS CONTRAST DATED 8/8/2019. History: Sepsis. Comparison: None. Technique:   Multiple contiguous helical CT images reconstructed at 5 mm 
intervals were obtained from above the diaphragms through the ischial 
tuberosities following without intravenous or oral contrast at the request of 
the ordering physician. All CT scans performed at this facility use one or all 
of the following: Automated exposure control, adjustment of the mA and/or kVp 
according to patient's size, iterative reconstruction. Findings: 
CT ABDOMEN:   
Limited evaluation of the lung bases and base of the mediastinum demonstrates 
postsurgical changes consistent with prior median sternotomy. Dense 
atherosclerotic calcification is seen of the coronary arteries of the heart with 
the heart appearing at least mild to moderately enlarged. Nonspecific basilar 
airspace changes are seen which are favored to represent atelectasis. A small 
hiatal hernia is seen. Assessment of the solid organs is limited by the lack of administered 
intravenous contrast. A subtle abnormality could be missed. The Liver is 
homogeneous in attenuation. Gas is seen within the common bile duct on image 26. Additional gas is seen within the central left lobe of the liver on image 19 
which is favored to represent pneumobilia given this gas in the common bile 
duct. This could be due to a prior procedure and is not clearly abnormal. The 
spleen is homogeneous in attenuation. No contour deforming mass lesions are 
seen of the pancreas or adrenal glands. The gallbladder is not seen. No 
hydronephrosis is seen of the kidneys. The visualized loops of small bowel and colon are normal in caliber. Questionable wall thickening of proximal small bowel loops in the left abdomen 
are seen on image 37 which appear to demonstrate very mild adjacent mesenteric 
edema. No definite acutely inflamed colon is appreciated.  The appendix is felt 
to be seen on axial image 47 without acute abnormality. No free air or abnormal 
fluid collection is definitely seen. No adenopathy is seen. The abdominal 
aorta demonstrates moderate atherosclerotic calcification. A severe although 
chronic appearing compression deformity is seen of the T12 vertebral body CT PELVIS: 
No abnormal pelvic fluid collections or inflammatory changes are present. No 
pelvic adenopathy is seen. The urinary bladder is poorly distended limiting 
assessment. This contains gas although also contains a Clifton catheter with gas 
possibly introduce with Clifton catheter placement. No clear abnormal stranding of 
adjacent pelvic soft tissues is seen to suggest an acute inflammatory process of 
the urinary bladder. A calcified structure is seen in the central pelvis which 
likely represents a fibroid uterus. Impression IMPRESSION:   
1. The only potential etiology of sepsis is that there is some wall thickening 
suggested of more proximal small bowel loops in the left abdomen with mild 
adjacent mesenteric stranding which can suggest inflammation (i.e. enteritis). No free air or abnormal fluid collection is definitely seen on this limited 
noncontrast study. 2. Nonspecific basilar airspace changes of the lungs likely representing 
atelectasis given the distribution although pneumonia is not excluded if the 
patient has symptoms of pneumonia. 3. Gas within the urinary bladder although this very well may have been 
introduced with Clifton catheter placement. This would be best further assessed 
with urinalysis if this has not been recently performed. Results from Hospital Encounter encounter on 01/30/19 CT HEAD WO CONT Narrative CT HEAD WITHOUT CONTRAST. INDICATION: Altered mental status. COMPARISON: 28 April 2015. TECHNIQUE:   5 mm axial scans from the skull base to the vertex.   Our CT 
scanners use one or more of the following:  Automated exposure control, 
adjustment of the mA and or kV according to patient size, iterative 
reconstruction. FINDINGS:  No acute intraparenchymal hemorrhage or abnormal extra-axial fluid 
collection. The ventricles are normal size. There is white matter low 
attenuation is present, nonspecific, likely chronic small vessel disease. Old 
tiny lacunar infarct left thalamus. No midline shift or mass effect. Included 
portion of the paranasal sinuses and orbits grossly unremarkable. No skull 
fracture. Impression IMPRESSION:  Negative for acute intracranial abnormality. Chronic changes. MRI Results (maximum last 3): [place] [facility] [place]20 MRI BRAIN WO CONT Narrative Exam: MRI BRAIN WO CONT on 1/27/2021 12:06 PM 
 
Clinical History: The Female patient is 78years old presenting for Post cardiac 
arrest and unresponsive. COVID confirmed. Comparison:  Head CT 1/20/2021 Technique:  Axial T2, axial FLAIR, axial diffusion-weighted,  sagittal T1 and 
coronal gradient-echo scans were performed. Findings:  
 
Age-related cortical involutional changes are seen with sulcal and ventricular 
prominence. There is no evidence of acute intracranial ischemia. Remote ischemic 
changes are noted throughout the inferior right temporal occipital lobe with 
focal encephalomalacia, and gliosis as well as slight laminar necrosis. Chronic 
lacunae are seen throughout the basal ganglia and thalami and minimally the 
corin. There are no abnormal extra-axial fluid collections. No evidence of mass or mass 
effect is seen. There is no diffusion signal abnormality. Expected flow voids 
are maintained in the major intracranial vessels. The cerebellum is otherwise unremarkable. There is no evidence of Chiari 
malformation. The ventricular system and CSF containing spaces are unremarkable in appearance. Visualized extracranial soft tissues are unremarkable. The paranasal sinuses are well pneumatized and aerated. Impression 1. Stable age-related senescent changes and chronic microvascular disease with 
remote right posterior temporal occipital ischemic insult. CPT code(s) [id number] Results from Hospital Encounter encounter on 01/30/19 MRI BRAIN W WO CONT Narrative MRI of the brain INDICATION:  Seizures, encephalopathy Standard MRI sequences were obtained through the brain in multiple planes. Images were obtained before and after intravenous infusion of 15 mL of Dotarem. FINDINGS: 
There is mild diffuse cerebral and cerebellar atrophy. There is mild chronic 
change in the basal ganglia and brainstem. There is focal encephalomalacia in 
the right occipital lobe, likely old infarct. Temporal lobes are symmetric in 
size. .  There is no evidence of acute hemorrhage or infarction. The ventricles 
are normal in size. There are no extra-axial fluid collections. There are no 
bony lesions. The sinuses are clear. Post-contrast images show no abnormal enhancement. There are no masses. Impression IMPRESSION: Old right occipital infarct. No evidence of acute intracranial 
abnormality Results from Abstract encounter on 10/09/14 MRI [facility] SPINE W WO CONT Nuclear Medicine Results (maximum last 3): No results found for this or any previous visit. US Results (maximum last 3): Results from Hospital Encounter encounter on 09/16/19 US RETROPERITONEUM COMP Narrative RENAL ULTRASOUND 9/16/2019 6:13 PM 
 
HISTORY: Acute kidney injury; acute renal failure; generalized weakness with 
decreased oral intake; PRATIMA TECHNIQUE: Sonographic imaging of the kidneys, bladder and retroperitoneal 
vessels was performed. COMPARISON: CT abdomen and pelvis August 8, 2019 FINDINGS: The right kidney is 9.7 cm in length. The left kidney is 9.6 cm in 
length. There is no hydronephrosis. There are no visible shadowing renal 
calculi. The bladder is normal in appearance. The IVC is patent.  The abdominal aorta is 1.8 cm in diameter. Impression IMPRESSION:  No hydronephrosis or acute renal findings. DEXA Results (maximum last 3): Results from Abstract encounter on 09/15/14 DEXA BONE DENSITY STUDY AXIAL  
 
 
ASIA Results (maximum last 3): Results from Abstract encounter on 09/17/14 ASIA MAMMOGRAM SCREEN BILAT  
 
 
IR Results (maximum last 3): Results from [facility] [place] on 12/24/20 IR INSERT NON TUNL CVC OVER 5 YRS Narrative Title:  Temporary hemodialysis catheter placement. Indication:  49-year-old female with acute kidney injury, COVID pneumonia, acute 
hypoxemic respiratory failure. This procedure was performed at the patient's bedside due to her clinical acuity 
and to limit the risk of spreading infection throughout the hospital during 
transport. A temporary hemodialysis catheter is placed for this patient through the right 
internal jugular vein using ultrasound and fluoroscopic guidance with maximum 
sterile barrier appropriately documented and fluoro time and images documented 
in the report :  Parish Chaparro PA-C Supervising Physician: Zak Ortiz M.D. Consent: Informed consent was obtained via the ICU global consent form. Cheryl Anderson Procedure: This central venous catheter was inserted with all elements of 
maximal sterile barrier technique and cap and mask and sterile gown and sterile 
gloves and sterile full-body drape and hand hygiene and 2% chlorhexidine for 
cutaneous antisepsis and sterile ultrasound gel and sterile ultrasound probe 
cover. Following routine prep and drape of the RIGHT neck, a local field block with 
lidocaine was achieved. Ultrasound evaluation of all potential access sites was 
performed due to lack of a palpable vein. No veins were palpable due to 
overlying adipose tissue.   Using real-time ultrasound guidance, with appropriate 
image recording and visualization of vascular needle entry, the patent RIGHT 
internal jugular vein was accessed using micropuncture technique. A hemodialysis catheter was advanced over the wire. All lumens aspirated easily and were filled with heparinized saline. The 
catheter was secured with nonabsorbable suture. Sterile dressings were applied. Complications: None. Radiation dose:  
Fluoroscopy time: 0 seconds. Reference air kerma (mGy): 0 Kerma area product (cGy.cm2):  0 Fluoroscopic images: 0 Contrast:  0 ml. Medications:  Lidocaine Findings:  Patent right internal jugular vein. Impression Impression:  Technically successful temporary hemodialysis catheter placement. Plan: Chest x-ray to confirm placement. Results from Hospital Encounter encounter on 01/30/19 IR SPINAL PUNCTURE LUMBAR DIAGNOSTIC Narrative Title: Lumbar puncture. History: Critically ill, encephalopathic, 78-year-old woman. Emergent 
diagnostic lumbar puncture requested. Consent: Informed written and oral consent was obtained from the patient's 
daughter after explanation of benefits and risks of the procedure. Procedure:  Sterile barrier technique (including:  cap, mask, sterile gloves, 
sterile sheet, hand hygiene, and chlorhexidene for cutaneous antisepsis) were 
used. With the patient prone, the skin of the back was prepped and draped in 
the standard sterile fashion. 1% lidocaine was used for local field block. Using 
fluoroscopy, a 22 gauge spinal needle was advanced into the intrathecal space at 
the L5-S1 level. Appropriate position was confirmed with clear spinal fluid 
return. Unfortunately, the patient immediately began moving, turning from side 
to side, and the flow of CSF return bloody and then stopped. Throughout the 
procedure, 3 and eventually 4, assistants helped to hold her still and keep her 
safe. Opening pressure was 18 cm H20. A total of about 2-3 cc was removed for evaluation.    
 
The needle was removed and a dressing was applied. Complications:  None. Radiation Exposure Indices: 
Reference Air Kerma (Ka,r) = 4 mGy Dose Area Product/Kerma Area Product (DAP/KARLOS/PKA) = 54.91 cGy-cm2 Fluoroscopy Exposure Time = 24 seconds Fluorographic Images = 3 Contrast:  0 milliliters. Impression Impression: Uncomplicated lumbar puncture. Plan: The patient will recover at bedrest for 1 hours in her hospital room. Recommend no additional anticoagulation for 24 hours. VAS/US Results (maximum last 3): Results from [facility] [place] Encounter encounter on 12/24/20 DUPLEX LOWER EXT VENOUS BILAT Narrative Bilateral lower extremity venous ultrasound INDICATION:  Pain and swelling, Doppler ultrasound of both lower extremities was performed. FINDINGS:  There is normal flow in the greater saphenous, common femoral, 
superficial femoral, and popliteal veins. Normal compression and augmentation is 
demonstrated. The proximal calf veins are also patent. Impression IMPRESSION: No evidence of deep venous thrombosis in either lower extremity Results from Abstract encounter on 10/20/20 DUPLEX LOW EXT ARTERY RIGHT Results from Office Visit encounter on 10/19/20 DUPLEX LOW EXT ARTERIES WITH EVERETT Narrative Final Vascular Lab ultrasound report scanned under the imaging tab. Click RESULTS link to view. PET Results (maximum last 3): No results found for this or any previous visit. EKG Results Procedure 720 Value Units Date/Time EKG, 12 LEAD, INITIAL [[id number]] Collected: 04/16/21 1224 Order Status: Completed Updated: 04/16/21 1335 Ventricular Rate 52 BPM   
  Atrial Rate 52 BPM   
  P-R Interval 228 ms QRS Duration 94 ms Q-T Interval 506 ms QTC Calculation (Bezet) 470 ms Calculated P Axis 59 degrees Calculated R Axis 66 degrees Calculated T Axis 103 degrees Diagnosis --  
  !! AGE AND GENDER SPECIFIC ECG ANALYSIS !!  
Sinus bradycardia with 1st degree A-V block Cannot rule out Anterior infarct , age undetermined Abnormal ECG When compared with ECG of 16-APR-2021 11:27, 
Premature ventricular complexes are no longer Present Nonspecific T wave abnormality no longer evident in Inferior leads Active Problems: 
Patient Active Problem List  
 Diagnosis Date Noted  CHF exacerbation ([facility] 04/16/2021  Cardiac arrest ([facility] 12/30/2020  COVID-19 12/24/2020  Acute respiratory distress syndrome (ARDS) due to severe acute respiratory syndrome coronavirus 2 (SARS-CoV-2) ([facility] 12/24/2020  Acute hypoxemic respiratory failure ([facility] 12/24/2020  Noncompliance 03/08/2020  Hypoalbuminemia due to protein-calorie malnutrition ([facility] 03/08/2020  Acute on chronic renal failure ([facility] 09/17/2019  Generalized weakness 09/16/2019  Decreased oral intake 09/16/2019  Anemia 08/08/2019  Coronary artery disease involving coronary bypass graft of native heart without angina pectoris 06/15/2015  Uncontrolled type 2 diabetes mellitus with hyperglycemia ([facility] 06/15/2015  Essential hypertension 06/15/2015  HLD (hyperlipidemia) 06/15/2015  Chronic kidney disease, stage III (moderate) ([facility] 06/15/2015  Osteopenia 06/15/2015  Vitamin D deficiency 06/15/2015  Gastroesophageal reflux disease without esophagitis 06/15/2015  Other allergic rhinitis 06/15/2015  Peripheral neuropathy 06/15/2015  Primary osteoarthritis involving multiple joints 06/15/2015  Insomnia 06/15/2015  RLS (restless legs syndrome) 06/15/2015  Diabetes mellitus with hyperosmolarity without hyperglycemic hyperosmolar nonketotic coma ([facility] 11/24/2014 Modesta Mckeon  [facility] 4/16/2021 3:39 PM

## 2021-04-16 NOTE — PROGRESS NOTES
Patient remains in stable condition with respirations even/unlabored. No acute distress noted. No needs noted or voiced at this time. Safety measures in place. Patient on oxygen. Family at bedside. Call light remains within reach. Preparing to give report to oncoming shift.

## 2021-04-16 NOTE — ED TRIAGE NOTES
Pt arrives from [facility] and ems was called out for aphasia, tremors and that is not normal but paperwork reports hx. Denies any falls. bgl 69. 120/69, hr 50, sinus kevyn. 88% RA, 98% 4L. Presents with left ab wound that is bandaged. Daughter reports feeding tube but no longer there. RACE 1,  the same but weak on both sides. Ruslan at bedside.

## 2021-04-16 NOTE — PROGRESS NOTES
Patient arrived to room 806 with daughter at bedside. Patient moved to bed with assist x 3. Oxygen to 4 L NC. Dual skin assessment completed with [facility], RN. No pressure injuries noted. STAND completed. Patient had no difficulties swallowing. Patient is A/O x 3 but speaks with a whisper. Tremor noted.

## 2021-04-16 NOTE — ED PROVIDER NOTES
78-year-old lady presents with concerns about confusion and altered mental status. Spoke with patient's daughter who spoke with the patient yesterday morning at about 1030 and she noted that the patient's voice seemed weak and raspy. At that time she was answering questions appropriately but does seem fatigued. Patient is currently at a rehab facility after being discharged from our hospital a few weeks ago after an extensive Covid stay. She had been in the ICU with a tracheostomy and feeding tube. Daughter said that she had been getting better until she got a UTI several days ago. That was being treated with Augmentin. Apparently according to the [facility] her last dose of the Augmentin was 2 days ago. Rehab facility sent the patient here because they thought that her language was impaired and that she may have been having some tremors. Patient does have a history of tremors and the daughter says that her speech is weak but she is still answering questions appropriately and at her mental baseline. Patient says that she just feels tired and fatigued but does not have any pain, difficulty breathing, nausea, vomiting, or diarrhea. Patient was discharged with a feeding tube in place but that is no longer in place. The daughter mentioned that there was a plan to remove it but the daughter had not been able to go to the rehab facility for the last week because they had several new cases of Covid there. So the daughter did not know that it had already been removed. Daughter did say that the patient is capable of swallowing and eating and drinking without the tube. No other associated symptoms. Elements of this note were created using speech recognition software. As such, errors of speech recognition may be present. Past Medical History:  
Diagnosis Date  Acute cystitis without hematuria 1/30/2019  Acute pancreatitis 8/12/2019  CAD (coronary artery disease)  Diabetic ketoacidosis ([facility] 3/8/2020  DM2 (diabetes mellitus, type 2) ([facility]  Elevated liver function tests 8/8/2019  Encephalopathy 2/7/2021  Gout  Gram-negative bacteremia 8/9/2019  HLD (hyperlipidemia)  HTN (hypertension)  Peripheral neuropathy  Right lower quadrant abdominal pain 8/8/2019 Past Surgical History:  
Procedure Laterality Date  HX CHOLECYSTECTOMY jennifer in 1964  HX CORONARY ARTERY BYPASS GRAFT    
 x3  
 HX TUBAL LIGATION    
 IR INSERT NON TUNL CVC OVER 5 YRS  1/11/2021  OK CARDIAC SURG PROCEDURE UNLIST    
 stents x 3 2009 Family History:  
Problem Relation Age of Onset  Hypertension Mother  Cancer Mother  Diabetes Mother  Heart Disease Mother Social History Socioeconomic History  Marital status: SINGLE Spouse name: Not on file  Number of children: Not on file  Years of education: Not on file  Highest education level: Not on file Occupational History  Occupation: retired  Social Needs  Financial resource strain: Not on file  Food insecurity Worry: Not on file Inability: Not on file  Transportation needs Medical: Not on file Non-medical: Not on file Tobacco Use  Smoking status: Never Smoker  Smokeless tobacco: Never Used Substance and Sexual Activity  Alcohol use: Yes Comment: socially  Drug use: No  
 Sexual activity: Not on file Lifestyle  Physical activity Days per week: Not on file Minutes per session: Not on file  Stress: Not on file Relationships  Social connections Talks on phone: Not on file Gets together: Not on file Attends Anabaptism service: Not on file Active member of club or organization: Not on file Attends meetings of clubs or organizations: Not on file Relationship status: Not on file  Intimate partner violence Fear of current or ex partner: Not on file   Emotionally abused: Not on file Physically abused: Not on file Forced sexual activity: Not on file Other Topics Concern  Not on file Social History Narrative Lives alone but has a caregiver who helps several hours per day, several days per week ALLERGIES: Sulfa (sulfonamide antibiotics) Review of Systems Constitutional: Positive for appetite change and fatigue. Negative for chills, diaphoresis and fever. HENT: Negative for congestion, rhinorrhea and sore throat. Eyes: Negative for redness and visual disturbance. Respiratory: Negative for cough, chest tightness, shortness of breath and wheezing. Cardiovascular: Negative for chest pain and palpitations. Gastrointestinal: Negative for abdominal pain, blood in stool, diarrhea, nausea and vomiting. Endocrine: Negative for polydipsia and polyuria. Genitourinary: Negative for dysuria and hematuria. Musculoskeletal: Negative for arthralgias, myalgias and neck stiffness. Skin: Negative for rash and wound. Neurological: Negative for dizziness, weakness and headaches. Hematological: Negative for adenopathy. Does not bruise/bleed easily. Psychiatric/Behavioral: Positive for confusion. Negative for sleep disturbance. The patient is not nervous/anxious. Vitals:  
 04/16/21 1122 BP: 132/62 Pulse: (!) 54 Resp: 19 Temp: 97.5 °F (36.4 °C) SpO2: 93% Weight: 63.5 kg (140 lb) Height: 5' 5\" (1.651 m) Physical Exam 
Vitals signs and nursing note reviewed. Constitutional:   
   Appearance: Normal appearance. Comments: Global fatigue HENT:  
   Head: Normocephalic and atraumatic. Cardiovascular:  
   Rate and Rhythm: Normal rate and regular rhythm. Pulmonary:  
   Effort: Pulmonary effort is normal.  
   Breath sounds: Normal breath sounds. Abdominal:  
   General: Bowel sounds are normal.  
   Palpations: Abdomen is soft. Neurological:  
   General: No focal deficit present.   
   Mental Status: She is alert and oriented to person, place, and time. Comments: Since language comprehension and production is normal.  She does have some overall global weakness but no focal abnormalities. MDM Number of Diagnoses or Management Options Diagnosis management comments: Given her symptoms I will check her basic blood work including her electrolytes and chemistries to look for a metabolic abnormality. I will also get a chest x-ray to see if she has any residual pleural effusions. We will check her urine. ED Course as of Apr 16 1338 Fri Apr 16, 2021  
1230 EKG review:Sinus bradycardiaNo acute ischemic ST segment changes No QRS wideningNo QTC prolongationRate of: 52  
 [AC] ED Course User Index [AC] Tc Quintero MD  
 
 
Procedures

## 2021-04-17 NOTE — PROGRESS NOTES
SPEECH LANGUAGE PATHOLOGY: DYSPHAGIA- Initial Assessment and Discharge NAME/AGE/GENDER: Darlin Chun is a 78 y.o. female DATE: 4/17/2021 PRIMARY DIAGNOSIS: CHF exacerbation ([facility] [I50.9] ICD-10: Treatment Diagnosis: R13.12 Dysphagia, Oropharyngeal Phase RECOMMENDATIONS  
DIET:  
Regular Consistency Thin Liquids MEDICATIONS: With liquid ASPIRATION PRECAUTIONS Slow rate of intake Small bites/sips Upright at 90 degrees during meal 
  
COMPENSATORY STRATEGIES/MODIFICATIONS None RECOMMENDATIONS for CONTINUED SPEECH THERAPY:  
No further speech therapy indicated at this time. ASSESSMENT Patient presents with oropharyngeal swallow function that is within normal limits. No s/sx of dysphagia identified. Recommend continue regular diet with thin liquids. No further skilled SLP intervention warranted at this time. Thank you for this referral. 
 
 
EDUCATION: 
Recommendations discussed with Patient and RN 
CONTINUATION OF SKILLED SERVICES/MEDICAL NECESSITY: 
No additional speech services warranted. REHABILITATION POTENTIAL FOR STATED GOALS: Excellent PLAN   
FREQUENCY/DURATION: No further speech therapy indicated at this time as oropharyngeal swallow function is within normal limits. - Recommendations for next treatment session: No additional speech therapy indicated at this time. SUBJECTIVE Pt sleeping and difficult to wake, however, woke to loud verbal stim with gentle sternal rub. Pt accepting of po trials with encouragement. Oxygen Device: nasal canula Pain: Pain Scale 1: Numeric (0 - 10) Pain Intensity 1: 0 History of Present Injury/Illness: Ms. Stephen Contreras  has a past medical history of Acute cystitis without hematuria (1/30/2019), Acute pancreatitis (8/12/2019), CAD (coronary artery disease), Diabetic ketoacidosis ([facility] (3/8/2020), DM2 (diabetes mellitus, type 2) ([facility] Elevated liver function tests (8/8/2019), Encephalopathy (2/7/2021), Gout, Gram-negative bacteremia (8/9/2019), HLD (hyperlipidemia), HTN (hypertension), Peripheral neuropathy, and Right lower quadrant abdominal pain (8/8/2019). . She also  has a past surgical history that includes hx tubal ligation; hx coronary artery bypass graft; pr cardiac surg procedure unlist; hx cholecystectomy; and ir insert non tunl cvc over 5 yrs (1/11/2021). PRECAUTIONS/ALLERGIES: Sulfa (sulfonamide antibiotics) Problem List:  (Impairments causing functional limitations): 
none Previous Dysphagia: YES MBS completed 3/4/21 with trach/PMV (pt no longer has trach). MBS recommendation of mech soft diet with thin liquids. No pen/asp. Diet Prior to Evaluation: regular/thin liquids Orientation: 
Person Cognitive-Linguistic Screening: 
Alertness Not alert; Needs significant cues to respond Once awake, pt responding but speech poorly intelligible Speech Production:  
Unintelligible - low volume Minimal attempts to verbalize Expressive Language: Able to effectively communicate wants and needs Receptive Language: 
Needs repetition and/or demonstration for command following Cognition:  
confused Prior Level of Function: assisted Recommendations: Given results of screening, patient appears to be functioning at baseline. No acute assessment of speech, language, or cognition warranted. OBJECTIVE Oral Motor:  
Labial: No impairment Dentition: Intact Oral Hygiene: Adequate Lingual: No impairment Dysphagia evaluation: 
 Patient consumed trials of thin liquids by straw and regular solids with timely/distinct swallows, no oral residue, and no cough or change in clear/dry vocal quality. Pt known to SLP team and had MBS on 3/4/21 with trach/PMV in place. MBS revealed no pen/asp and diet of mech soft with thin liquids was recommended at that time. RN reports pt refusing to eat as the reason for SLP consult this date. Tool Used: Dysphagia Outcome and Severity Scale (ELKE) Score Comments Normal Diet  [x] 7 With no strategies or extra time needed Functional Swallow  [] 6 May have mild oral or pharyngeal delay Mild Dysphagia  [] 5 Which may require one diet consistency restricted Mild-Moderate Dysphagia  [] 4 With 1-2 diet consistencies restricted Moderate Dysphagia  [] 3 With 2 or more diet consistencies restricted Moderate-Severe Dysphagia  [] 2 With partial PO strategies (trials with ST only) Severe Dysphagia  [] 1 With inability to tolerate any PO safely Score:  Initial: 7 Most Recent: x (Date 04/17/21 ) Interpretation of Tool: The Dysphagia Outcome and Severity Scale (ELKE) is a simple, easy-to-use, 7-point scale developed to systematically rate the functional severity of dysphagia based on objective assessment and make recommendations for diet level, independence level, and type of nutrition. Current Medications: No current facility-administered medications on file prior to encounter. Current Outpatient Medications on File Prior to Encounter Medication Sig Dispense Refill  
 atorvastatin (LIPITOR) 40 mg tablet Take 40 mg by mouth. aspirin delayed-release 81 mg tablet Take  by mouth daily. metoprolol tartrate (LOPRESSOR) 25 mg tablet Take 0.5 Tabs by mouth every twelve (12) hours. 60 Tab 1  
 insulin lispro (HUMALOG) 100 unit/mL injection 15 Units by SubCUTAneous route Before breakfast, lunch, and dinner. 1 Vial 0 Insulin Needles, Disposable, 31 gauge x 5/16\" ndle by SubCUTAneous route Before breakfast, lunch, dinner and at bedtime. 1 Package 11  
 clopidogrel (PLAVIX) 75 mg tab Take 75 mg by mouth. insulin glargine (LANTUS) 100 unit/mL injection 50 Units by SubCUTAneous route nightly. 1 Vial 0 Blood-Glucose Meter (ONETOUCH ULTRAMINI) monitoring kit Use as directed 1 Kit 0  
 glucose blood VI test strips (ONETOUCH ULTRA TEST) strip Test 4 times daily as directed 100 Strip 11  Lancets (ONETOUCH ULTRASOFT LANCETS) misc Test 4 times daily as directed 100 Each 11  
 losartan (COZAAR) 100 mg tablet Take  by mouth daily. rosuvastatin (CRESTOR) 20 mg tablet Take 1 Tab by mouth nightly. 90 Tab 1  
 gabapentin (NEURONTIN) 100 mg capsule Take 1 Cap by mouth three (3) times daily. (Patient taking differently: Take 100 mg by mouth two (2) times a day.) 30 Cap 5  
 rOPINIRole (REQUIP) 0.5 mg tablet Take 1 Tab by mouth nightly as needed. 90 Tab 1 INTERDISCIPLINARY COLLABORATION: RN After treatment position/precautions: 
RN notified Pt reclined in bed Total Treatment Duration:  
Time In: 4997 Time Out: 1325 June Perez MA, CCC-SLP

## 2021-04-17 NOTE — PROGRESS NOTES
Unique Hospitalist Note Admit Date:  2021 11:15 AM  
Name:  Brandon Rincon Age:  78 y.o. 
:  1940 MRN:  [id number] PCP:  Keron Meza MD 
Treatment Team: Attending Provider: Stu Espinoza MD; Primary Nurse: Jerad Anthony; Staff Nurse: Adolfo Guthrie RN; Primary Nurse: Polo Room, RN 
 
HPI/Subjective:  
Patient is a 78 y.o. female with medical h/o 3-month hospitalization for [id number] pneumonia complicated with ARDS, intubations, acute kidney injury on chronic requiring dialysis, baseline diabetes CKD CAD status post bypass graft. Patient was discharged to long-term acute care in March, and later discharged to facility for rehab, patient was found by daughter to be more altered, onset 1 week ago, slowly progressive, unable to formulate speech clearly at times, episodic, also with \"shaking episodes\" . She is recently treated for UTI as well. Patient is admitted due to altered mental status and hypoxia. : Patient is seen at the bedside. Not able to obtain review of systems due to patient's current condition. Patient is altered and confused. I do not know patient's baseline and will call family to get update. Assessment and Plan:  
 
Acute on chronic respiratory failure with hypoxia and hypercapnia: ABG with decompensation respiratory acidosis, initially requiring BiPAP Currently on 4 L nasal cannula Continue nebs treatment as needed Incentive spirometry Wean oxygen as tolerated Acute on chronic congestive heart failure exacerbation with preserved EF on echocardiogram 2020: 
Continue IV Lasix, rosuvastatin, metoprolol Repeat echocardiogram 
Strict ins and outs Bilateral airspace and interstitial opacities: 
Pulmonary edema versus atypical pneumonia Continue IV Lasix Strict ins and outs Procalcitonin negative Continue ceftriaxone/doxycycline for empiric coverage, de-escalate if indicated Follow-up on cultures, mycoplasma and pneumo antigen Acute metabolic encephalopathy: 
Possibly secondary to hypoxia versus atypical pneumonia Treat underlying etiology Patient is high risk for delirium as well Delirium precautions Close monitoring Acute on chronic renal failure: 
Patient with history of transient hemodialysis x3 last hospital stay Losartan held Monitor closely while diuresis Avoid nephrotoxic agent Chronic anemia: 
Stable Continue to monitor History of CAD status post CABG: 
Continue home aspirin,Plavix, metoprolol Hold losartan given PRATIMA Type 2 diabetes mellitus: 
Continue Lantus and SSI History of COVID-19 infection and ARDS requiring intubation: 
Monitor clinically Bilateral thyroid nodule: Outpatient evaluation Discharge planning: Physical therapy consulted and recommend STR. Case management consulted DVT ppx ordered Code status:  Full Estimated LOS:  Greater than 2 midnights Risk:  high Hospital Problems as of 4/17/2021 Date Reviewed: 2/5/2021 Codes Class Noted - Resolved POA * (Principal) CHF exacerbation ([facility] ICD-10-CM: I50.9 ICD-9-CM: 428.0  4/16/2021 - Present Unknown Acute hypoxemic respiratory failure ([facility] ICD-10-CM: J96.01 
ICD-9-CM: 518.81  12/24/2020 - Present Yes Acute on chronic renal failure (HCC) ICD-10-CM: N17.9, N18.9 ICD-9-CM: 584.9, 585.9  9/17/2019 - Present Yes Anemia ICD-10-CM: D64.9 ICD-9-CM: 285.9  8/8/2019 - Present Yes Coronary artery disease involving coronary bypass graft of native heart without angina pectoris (Chronic) ICD-10-CM: Q01.495 ICD-9-CM: 414.05  6/15/2015 - Present Yes Uncontrolled type 2 diabetes mellitus with hyperglycemia (HCC) (Chronic) ICD-10-CM: E11.65 ICD-9-CM: 250.02  6/15/2015 - Present Yes Essential hypertension (Chronic) ICD-10-CM: I10 
ICD-9-CM: 401.9  6/15/2015 - Present Yes HLD (hyperlipidemia) (Chronic) ICD-10-CM: D94.3 ICD-9-CM: 272.4  6/15/2015 - Present Yes 10 systems reviewed and negative except as noted in HPI. Past Medical History:  
Diagnosis Date  Acute cystitis without hematuria 1/30/2019  Acute pancreatitis 8/12/2019  CAD (coronary artery disease)  Diabetic ketoacidosis ([facility] 3/8/2020  DM2 (diabetes mellitus, type 2) ([facility]  Elevated liver function tests 8/8/2019  Encephalopathy 2/7/2021  Gout  Gram-negative bacteremia 8/9/2019  HLD (hyperlipidemia)  HTN (hypertension)  Peripheral neuropathy  Right lower quadrant abdominal pain 8/8/2019 Past Surgical History:  
Procedure Laterality Date  HX CHOLECYSTECTOMY jennifer in 1964  HX CORONARY ARTERY BYPASS GRAFT    
 x3  
 HX TUBAL LIGATION    
 IR INSERT NON TUNL CVC OVER 5 YRS  1/11/2021  NM CARDIAC SURG PROCEDURE UNLIST    
 stents x 3 2009 Allergies Allergen Reactions  Sulfa (Sulfonamide Antibiotics) Swelling Social History Tobacco Use  Smoking status: Never Smoker  Smokeless tobacco: Never Used Substance Use Topics  Alcohol use: Yes Comment: socially Family History Problem Relation Age of Onset  Hypertension Mother  Cancer Mother  Diabetes Mother  Heart Disease Mother Family history reviewed and noncontributory. Immunization History Administered Date(s) Administered  Influenza Vaccine 08/18/2014  Influenza Vaccine PF 10/18/2010, 11/09/2011, 10/03/2012  TB Skin Test (PPD) Intradermal 01/30/2019, 08/09/2019, 03/09/2020, 12/28/2020  Zoster Recombinant 07/10/2019 PTA Medications: 
Prior to Admission Medications Prescriptions Last Dose Informant Patient Reported? Taking? Blood-Glucose Meter (ONETOUCH ULTRAMINI) monitoring kit   No No  
Sig: Use as directed Insulin Needles, Disposable, 31 gauge x 5/16\" ndle   No No  
Sig: by SubCUTAneous route Before breakfast, lunch, dinner and at bedtime.   
Lancets (ONETOUCH ULTRASOFT LANCETS) misc   No No  
Sig: Test 4 times daily as directed  
aspirin delayed-release 81 mg tablet   Yes No  
Sig: Take  by mouth daily. atorvastatin (LIPITOR) 40 mg tablet Not Taking at Unknown time  Yes No  
Sig: Take 40 mg by mouth. clopidogrel (PLAVIX) 75 mg tab   Yes No  
Sig: Take 75 mg by mouth.  
gabapentin (NEURONTIN) 100 mg capsule   No No  
Sig: Take 1 Cap by mouth three (3) times daily. Patient taking differently: Take 100 mg by mouth two (2) times a day. glucose blood VI test strips (ONETOUCH ULTRA TEST) strip   No No  
Sig: Test 4 times daily as directed  
insulin glargine (LANTUS) 100 unit/mL injection   No No  
Si Units by SubCUTAneous route nightly. insulin lispro (HUMALOG) 100 unit/mL injection   No No  
Sig: 15 Units by SubCUTAneous route Before breakfast, lunch, and dinner. losartan (COZAAR) 100 mg tablet   Yes No  
Sig: Take  by mouth daily. metoprolol tartrate (LOPRESSOR) 25 mg tablet   No No  
Sig: Take 0.5 Tabs by mouth every twelve (12) hours. rOPINIRole (REQUIP) 0.5 mg tablet   No No  
Sig: Take 1 Tab by mouth nightly as needed. rosuvastatin (CRESTOR) 20 mg tablet   No No  
Sig: Take 1 Tab by mouth nightly. Facility-Administered Medications: None Objective:  
 
Patient Vitals for the past 24 hrs: 
 Temp Pulse Resp BP SpO2  
21 1113 97.8 °F (36.6 °C) 61 18 126/71 94 % 21 0745 97.6 °F (36.4 °C) 64 18 139/77 93 % 21 0326 97.7 °F (36.5 °C) (!) 49 18 129/61 93 % 21 0004 97.6 °F (36.4 °C) 78 18 116/62 98 % 21 2151  80  120/60   
21 1910 97.7 °F (36.5 °C) 70 18 (!) 121/57 97 % 21 1802 97.8 °F (36.6 °C) (!) 54 18 132/66 92 % 21 1724  (!) 54 (!) 35  95 % 21 1648 98.7 °F (37.1 °C) (!) 52 24 (!) 146/63 94 % 21 1604  (!) 52  (!) 157/64   
21 1527  (!) 52 18  93 % 21 1523  (!) 53 20  92 % 21 1521  (!) 52  (!) 148/66   
21 1501  (!) 53 28 (!) 154/67   
21 1421  (!) 50 17 (!) 159/67   
21 1409  (!) 54 27  95 % 04/16/21 1400  (!) 51 17 (!) 158/69 95 % 04/16/21 1340  (!) 46 17 139/69   
04/16/21 1305  (!) 53  (!) 164/67  Oxygen Therapy O2 Sat (%): 94 % (04/17/21 1113) Pulse via Oximetry: 55 beats per minute (04/16/21 1724) O2 Device: Nasal cannula (04/16/21 1910) O2 Flow Rate (L/min): 4 l/min (04/16/21 1910) Estimated body mass index is 23.3 kg/m² as calculated from the following: 
  Height as of this encounter: 5' 5\" (1.651 m). Weight as of this encounter: 63.5 kg (140 lb). Intake/Output Summary (Last 24 hours) at 4/17/2021 1244 Last data filed at 4/17/2021 3889 Gross per 24 hour Intake 120 ml Output 650 ml Net -530 ml  
   
*Note that automatically entered I/Os may not be accurate; dependent on patient compliance with collection and accurate  by assistants. Physical Exam: 
General:    Well nourished. Alert. Eyes:   Normal sclerae. Extraocular movements intact. HENT:  Normocephalic, atraumatic. Moist mucous membranes CV:   RRR. No m/r/g. Lungs:  CTAB. No wheezing, rhonchi, or rales. Abdomen: Soft, nontender, nondistended. Extremities: Warm and dry. No cyanosis or edema. Neurologic: CN II-XII grossly intact. Sensation intact. Skin:     No rashes or jaundice. Normal coloration Psych:  Normal mood and affect. I reviewed the labs, imaging, EKGs, telemetry, and other studies done this admission. Data Reviewed:  
Recent Results (from the past 24 hour(s)) URINE MICROSCOPIC Collection Time: 04/16/21  1:00 PM  
Result Value Ref Range WBC 10-20 0 /hpf  
 RBC 20-50 0 /hpf Epithelial cells 0-3 0 /hpf Bacteria 0 0 /hpf Casts 3-5 0 /lpf CREATININE, UR, RANDOM Collection Time: 04/16/21  1:00 PM  
Result Value Ref Range Creatinine, urine 89.30 mg/dL SODIUM, UR, RANDOM Collection Time: 04/16/21  1:00 PM  
Result Value Ref Range Sodium,urine random 31 MMOL/L  
GLUCOSE, POC  Collection Time: 04/16/21  1:34 PM Result Value Ref Range Glucose (POC) 124 (H) 65 - 100 mg/dL Performed by [facility]   
LACTIC ACID Collection Time: 04/16/21  2:03 PM  
Result Value Ref Range Lactic acid 0.7 0.4 - 2.0 MMOL/L  
COVID-19 RAPID TEST Collection Time: 04/16/21  2:03 PM  
Result Value Ref Range Specimen source NASAL    
 COVID-19 rapid test Not detected NOTD    
POC G3 Collection Time: 04/16/21  3:46 PM  
Result Value Ref Range Device: NASAL CANNULA pH (POC) 7.26 (L) 7.35 - 7.45    
 pCO2 (POC) 53.3 (H) 35 - 45 MMHG  
 pO2 (POC) 63 (L) 75 - 100 MMHG  
 HCO3 (POC) 23.9 22 - 26 MMOL/L  
 sO2 (POC) 87.8 (L) 95 - 98 % Base deficit (POC) 3.3 mmol/L Allens test (POC) Positive Site LEFT RADIAL Specimen type (POC) ARTERIAL Performed by KamleshCHANDLER   
 FIO2, L/min 2 GLUCOSE, POC Collection Time: 04/16/21  4:48 PM  
Result Value Ref Range Glucose (POC) 66 65 - 100 mg/dL Performed by [facility]   
GLUCOSE, POC Collection Time: 04/16/21  5:23 PM  
Result Value Ref Range Glucose (POC) 193 (H) 65 - 100 mg/dL Performed by [employer] GLUCOSE, POC Collection Time: 04/16/21  9:55 PM  
Result Value Ref Range Glucose (POC) 76 65 - 100 mg/dL Performed by [address] [place] [address], POC Collection Time: 04/17/21  5:54 AM  
Result Value Ref Range Glucose (POC) 83 65 - 100 mg/dL Performed by [facility] Collection Time: 04/17/21  7:41 AM  
Result Value Ref Range Iron 64 35 - 150 ug/dL TIBC 240 (L) 250 - 450 ug/dL Transferrin Saturation 27 >20 % METABOLIC PANEL, COMPREHENSIVE Collection Time: 04/17/21  7:55 AM  
Result Value Ref Range Sodium 146 (H) 136 - 145 mmol/L Potassium 5.3 (H) 3.5 - 5.1 mmol/L Chloride 116 (H) 98 - 107 mmol/L  
 CO2 23 21 - 32 mmol/L Anion gap 7 7 - 16 mmol/L Glucose 128 (H) 65 - 100 mg/dL BUN 73 (H) 8 - 23 MG/DL  Creatinine 2.26 (H) 0.6 - 1.0 MG/DL GFR est AA 27 (L) >60 ml/min/1.73m2 GFR est non-AA 22 (L) >60 ml/min/1.73m2 Calcium 8.7 8.3 - 10.4 MG/DL Bilirubin, total 0.3 0.2 - 1.1 MG/DL  
 ALT (SGPT) 36 12 - 65 U/L  
 AST (SGOT) 24 15 - 37 U/L Alk. phosphatase 114 50 - 136 U/L Protein, total 7.3 6.3 - 8.2 g/dL Albumin 3.1 (L) 3.2 - 4.6 g/dL Globulin 4.2 (H) 2.3 - 3.5 g/dL A-G Ratio 0.7 (L) 1.2 - 3.5 MAGNESIUM Collection Time: 04/17/21  7:55 AM  
Result Value Ref Range Magnesium 2.7 (H) 1.8 - 2.4 mg/dL CBC WITH AUTOMATED DIFF Collection Time: 04/17/21  7:55 AM  
Result Value Ref Range WBC 4.5 4.3 - 11.1 K/uL  
 RBC 2.49 (L) 4.05 - 5.2 M/uL HGB 8.1 (L) 11.7 - 15.4 g/dL HCT 26.7 (L) 35.8 - 46.3 % .2 (H) 79.6 - 97.8 FL  
 MCH 32.5 26.1 - 32.9 PG  
 MCHC 30.3 (L) 31.4 - 35.0 g/dL RDW 20.0 (H) 11.9 - 14.6 % PLATELET 89 (L) 673 - 450 K/uL MPV 10.7 9.4 - 12.3 FL ABSOLUTE NRBC 0.04 0.0 - 0.2 K/uL  
 DF AUTOMATED NEUTROPHILS 73 43 - 78 % LYMPHOCYTES 18 13 - 44 % MONOCYTES 7 4.0 - 12.0 % EOSINOPHILS 1 0.5 - 7.8 % BASOPHILS 1 0.0 - 2.0 % IMMATURE GRANULOCYTES 1 0.0 - 5.0 %  
 ABS. NEUTROPHILS 3.3 1.7 - 8.2 K/UL  
 ABS. LYMPHOCYTES 0.8 0.5 - 4.6 K/UL  
 ABS. MONOCYTES 0.3 0.1 - 1.3 K/UL  
 ABS. EOSINOPHILS 0.1 0.0 - 0.8 K/UL  
 ABS. BASOPHILS 0.0 0.0 - 0.2 K/UL  
 ABS. IMM. GRANS. 0.1 0.0 - 0.5 K/UL FERRITIN Collection Time: 04/17/21  7:55 AM  
Result Value Ref Range Ferritin 665 (H) 8 - 388 NG/ML  
GLUCOSE, POC Collection Time: 04/17/21 12:32 PM  
Result Value Ref Range Glucose (POC) 66 65 - 100 mg/dL Performed by [employer] Results Procedure Component Value Units Date/Time CYNTHIA Masterson Conception, UR/CSF [[id number]] Collected: 04/17/21 1053 Order Status: Completed Updated: 04/17/21 1109 CULTURE, URINE [[id number]] Collected: 04/16/21 1300 Order Status: Completed Specimen: Urine from Clean catch Updated: 04/17/21 1767  CULTURE, BLOOD [[id number]] Collected: 04/16/21 1215 Order Status: Completed Specimen: Blood Updated: 04/17/21 5532 Special Requests: --     
  RIGHT 
ARM Culture result: NO GROWTH AFTER 19 HOURS     
 CULTURE, BLOOD [[id number]] Collected: 04/16/21 1212 Order Status: Completed Specimen: Blood Updated: 04/17/21 6389 Special Requests: --     
  RIGHT Antecubital 
  
  Culture result: NO GROWTH AFTER 19 HOURS     
 COVID-19 RAPID TEST [[id number]] Collected: 04/16/21 1403 Order Status: Completed Specimen: Nasopharyngeal Updated: 04/16/21 1434 Specimen source NASAL     
  COVID-19 rapid test Not detected Comment:     
The specimen is NEGATIVE for SARS-CoV-2, the novel coronavirus associated with COVID-19. A negative result does not rule out COVID-19. This test has been authorized by the FDA under an Emergency Use Authorization (EUA) for use by authorized laboratories. Fact sheet for Healthcare Providers: ConventionScicastsdate.co.nz Fact sheet for Patients: ConventionScicastsdate.co.nz Methodology: Isothermal Nucleic Acid Amplification Current Facility-Administered Medications Medication Dose Route Frequency  rOPINIRole (REQUIP) tablet 0.5 mg  0.5 mg Oral QHS PRN  
 rosuvastatin (CRESTOR) tablet 20 mg  20 mg Oral QHS  gabapentin (NEURONTIN) capsule 100 mg  100 mg Oral BID  insulin glargine (LANTUS) injection 15 Units  15 Units SubCUTAneous BID  clopidogreL (PLAVIX) tablet 75 mg  75 mg Oral DAILY  insulin lispro (HUMALOG) injection 7 Units  7 Units SubCUTAneous TIDAC  metoprolol tartrate (LOPRESSOR) tablet 12.5 mg  12.5 mg Oral Q12H  aspirin delayed-release tablet 81 mg  81 mg Oral DAILY  sodium chloride (NS) flush 5-40 mL  5-40 mL IntraVENous Q8H  
 sodium chloride (NS) flush 5-40 mL  5-40 mL IntraVENous PRN  
 acetaminophen (TYLENOL) tablet 650 mg  650 mg Oral Q6H PRN  Or  
 acetaminophen (TYLENOL) suppository 650 mg  650 mg Rectal Q6H PRN  polyethylene glycol (MIRALAX) packet 17 g  17 g Oral DAILY  senna-docusate (PERICOLACE) 8.6-50 mg per tablet 1 Tab  1 Tab Oral BID  magnesium hydroxide (MILK OF MAGNESIA) 400 mg/5 mL oral suspension 30 mL  30 mL Oral DAILY PRN  
 bisacodyL (DULCOLAX) suppository 10 mg  10 mg Rectal DAILY PRN  
 ondansetron (ZOFRAN ODT) tablet 4 mg  4 mg Oral Q8H PRN Or  
 ondansetron (ZOFRAN) injection 4 mg  4 mg IntraVENous Q6H PRN  
 heparin (porcine) injection 5,000 Units  5,000 Units SubCUTAneous Q8H  
 albumin human 25% (BUMINATE) solution 12.5 g  12.5 g IntraVENous BID  furosemide (LASIX) injection 40 mg  40 mg IntraVENous BID  cefTRIAXone (ROCEPHIN) 2 g in 0.9% sodium chloride (MBP/ADV) 50 mL MBP  2 g IntraVENous Q24H  
 doxycycline (VIBRAMYCIN) 100 mg in 0.9% sodium chloride (MBP/ADV) 100 mL MBP  100 mg IntraVENous Q12H  
 dextrose 40% (GLUTOSE) oral gel 1 Tube  15 g Oral PRN  
 glucagon (GLUCAGEN) injection 1 mg  1 mg IntraMUSCular PRN  
 dextrose (D50W) injection syrg 12.5-25 g  25-50 mL IntraVENous PRN  
 insulin lispro (HUMALOG) injection   SubCUTAneous AC&HS  famotidine (PEPCID) tablet 20 mg  20 mg Oral QPM  
 alcohol 62% (NOZIN) nasal  1 Ampule  1 Ampule Topical Q12H Other Studies: No results found for this visit on 04/16/21. Ct Head Wo Cont Result Date: 4/16/2021 EXAMINATION: CT HEAD WO CONT 4/16/2021 4:33 PM INDICATION: Achalasia and tremors COMPARISON: CT head January 22, 2021 TECHNIQUE: Multiple-row detector helical CT examination of the head without intravenous contrast. Radiation dose reduction techniques were used for this study. Our CT scanners use one or all of the following: Automated exposure control, adjustment of the mA and/or kV according to patient size, iterative reconstruction. FINDINGS: No acute intracranial hemorrhage. 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  Normal size of ventricles and extra-axial spaces for age. No space-occupying lesion. Bilateral mastoid fluid. Prior bilateral lens replacements. No abnormality of extracranial soft tissues. 1. No acute abnormality. 2. 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 Ct Chest Wo Cont Result Date: 4/16/2021 CT of the chest without contrast. CLINICAL INDICATION: Respiratory failure; Covid 19 positive PROCEDURE: Serial thin section axial images obtained from the thoracic inlet through the upper abdomen without the administration of intravenous contrast. Radiation dose reduction techniques were used for this study. Our CT scanners use one or all of the following: Automated exposure control, adjusted of the mA and/or kV according to patient size, iterative reconstruction COMPARISON: No prior FINDINGS: Bilateral thyroid nodules are appreciated. The heart is enlarged. There is trace, bilateral left greater than right pleural effusions. There is no pneumothorax. Patchy heterogeneous, dense/groundglass opacities noted in the bilateral upper lobes extending peripherally from the olivia. More irregular reticular nodular interstitial groundglass densities noted in the bilateral lower lobes. The pattern is nonspecific. Atypical pneumonia or pulmonary edema could appear similar. Limited evaluation of the upper abdomen is unremarkable. No aggressive osseous is identified. 1. Bilateral airspace and interstitial opacities involving all lobes with more confluent opacity noted in the bilateral upper lobes. Atypical pneumonia or pulmonary edema could appear similar. There is cardiomegaly. 2. Trace left greater than right bilateral pleural effusions. 3. Bilateral thyroid nodules. These could be more completely evaluated with thyroid ultrasound SARS-CoV-2 Lab Results \"Novel Coronavirus\" Test: No results found for: COV2NT \"Emergent Disease\" Test: No results found for: EDPR \"SARS-COV-2\" Test: No results found for: XGCOVT Rapid Test:  
Lab Results Component Value Date/Time COVR Not detected 04/16/2021 02:03 PM  
 
  
 
 
  
Part of this note was written by using a voice dictation software and the note has been proof read but may still contain some grammatical/other typographical errors.  
 
Signed: 
Stacy Cole MD

## 2021-04-17 NOTE — PROGRESS NOTES
Patient resting in bed, alert and oriented, cooperative with care. Patient on 4 liters of Oxygen via NC. Clifton catheter in place. Patient denies pain or distress, safety measures in place, call light within reach.

## 2021-04-17 NOTE — DISCHARGE INSTRUCTIONS
Patient Education        Heart Failure: Care Instructions  Your Care Instructions     Heart failure occurs when your heart does not pump as much blood as the body needs. Failure does not mean that the heart has stopped pumping but rather that it is not pumping as well as it should. Over time, this causes fluid buildup in your lungs and other parts of your body. Fluid buildup can cause shortness of breath, fatigue, swollen ankles, and other problems. By taking medicines regularly, reducing sodium (salt) in your diet, checking your weight every day, and making lifestyle changes, you can feel better and live longer. Follow-up care is a key part of your treatment and safety. Be sure to make and go to all appointments, and call your doctor if you are having problems. It's also a good idea to know your test results and keep a list of the medicines you take. How can you care for yourself at home? Medicines    · Be safe with medicines. Take your medicines exactly as prescribed. Call your doctor if you think you are having a problem with your medicine.     · Do not take any vitamins, over-the-counter medicine, or herbal products without talking to your doctor first. Robert Moncadalin not take ibuprofen (Advil or Motrin) and naproxen (Aleve) without talking to your doctor first. They could make your heart failure worse.     · You may take some of the following medicine. ? Angiotensin-converting enzyme inhibitors (ACEIs) or angiotensin II receptor blockers (ARBs) reduce the heart's workload, lower blood pressure, and reduce swelling. Taking an ACEI or ARB may lower your chance of needing to be hospitalized. ? Beta-blockers can slow heart rate, decrease blood pressure, and improve your condition. Taking a beta-blocker may lower your chance of needing to be hospitalized. ? Diuretics, also called water pills, reduce swelling. You will get more details on the specific medicines your doctor prescribes.   Diet    · Your doctor may suggest that you limit sodium. Your doctor can tell you how much sodium is right for you. An example is less than 3,000 mg a day. This includes all the salt you eat in cooking or in packaged foods. People get most of their sodium from processed foods. Fast food and restaurant meals also tend to be very high in sodium.     · Ask your doctor how much liquid you can drink each day. You may have to limit liquids. Weight    · Weigh yourself without clothing at the same time each day. Record your weight. Call your doctor if you have a sudden weight gain, such as more than 2 to 3 pounds in a day or 5 pounds in a week. (Your doctor may suggest a different range of weight gain.) A sudden weight gain may mean that your heart failure is getting worse. Activity level    · Start light exercise (if your doctor says it is okay). Even if you can only do a small amount, exercise will help you get stronger, have more energy, and manage your weight and your stress. Walking is an easy way to get exercise. Start out by walking a little more than you did before. Bit by bit, increase the amount you walk.     · When you exercise, watch for signs that your heart is working too hard. You are pushing yourself too hard if you cannot talk while you are exercising. If you become short of breath or dizzy or have chest pain, stop, sit down, and rest.     · If you feel \"wiped out\" the day after you exercise, walk slower or for a shorter distance until you can work up to a better pace.     · Get enough rest at night. Sleeping with 1 or 2 pillows under your upper body and head may help you breathe easier. Lifestyle changes    · Do not smoke. Smoking can make a heart condition worse. If you need help quitting, talk to your doctor about stop-smoking programs and medicines. These can increase your chances of quitting for good.  Quitting smoking may be the most important step you can take to protect your heart.     · Limit alcohol to 2 drinks a day for men and 1 drink a day for women. Too much alcohol can cause health problems.     · Avoid getting sick from colds and the flu. Get a pneumococcal vaccine shot. If you have had one before, ask your doctor whether you need another dose. Get a flu shot each year. If you must be around people with colds or the flu, wash your hands often. When should you call for help? Call 911 if you have symptoms of sudden heart failure such as:    · You have severe trouble breathing.     · You cough up pink, foamy mucus.     · You have a new irregular or rapid heartbeat. Call your doctor now or seek immediate medical care if:    · You have new or increased shortness of breath.     · You are dizzy or lightheaded, or you feel like you may faint.     · You have sudden weight gain, such as more than 2 to 3 pounds in a day or 5 pounds in a week. (Your doctor may suggest a different range of weight gain.)     · You have increased swelling in your legs, ankles, or feet.     · You are suddenly so tired or weak that you cannot do your usual activities. Watch closely for changes in your health, and be sure to contact your doctor if you develop new symptoms. Where can you learn more? Go to http://www.gray.com/  Enter J137 in the search box to learn more about \"Heart Failure: Care Instructions. \"  Current as of: August 31, 2020               Content Version: 12.8  © 5044-2518 [employer]. Care instructions adapted under license by [employer] (which disclaims liability or warranty for this information). If you have questions about a medical condition or this instruction, always ask your healthcare professional. [place] [address] any warranty or liability for your use of this information.

## 2021-04-17 NOTE — PROGRESS NOTES
ACUTE PHYSICAL THERAPY GOALS: 
(Developed with and agreed upon by patient and/or caregiver.) STG: 
(1.)Ms. Anton Malhotra will move from supine to sit and sit to supine  with MAXIMAL ASSIST within 3 treatment day(s). (2.)Ms. Anton Malhotra will transfer from bed to chair and chair to bed with MAXIMAL ASSIST using the least restrictive device within 3 treatment day(s). (3.)Ms. Anton Malhotra will demonstrate fair standing balance with CGA and tolerate up to 2 minutes with the least restrictive device within 3 treatment day(s). LTG: 
(1.)Ms. Anton Malhotra will move from supine to sit and sit to supine  in bed with CONTACT GUARD ASSIST within 7 treatment day(s). (2.)Ms. Anton Malhotra will transfer from bed to chair and chair to bed with CONTACT GUARD ASSIST using the least restrictive device within 7 treatment day(s). (3.)Ms. Anton Malhotra will ambulate with MINIMAL ASSIST for 100+ feet with the least restrictive device within 7 treatment day(s). (4.)Ms. Anton Malhotra will tolerate up to 25 minutes of therapeutic exercises/activites within 7 treatment days demonstrating improvements in CV endurance. 
________________________________________________________________________________________________ PHYSICAL THERAPY ASSESSMENT: Initial Assessment PT Treatment Day # 1 Genell Gilford is a 78 y.o. female PRIMARY DIAGNOSIS: CHF exacerbation ([facility] [address] CHF exacerbation ([facility] [address] [I50.9] Reason for Referral:  AMS, aphasia, weakness ICD-10: Treatment Diagnosis: Generalized Muscle Weakness (M62.81) Difficulty in walking, Not elsewhere classified (R26.2) Other abnormalities of gait and mobility (R26.89) [place] / [place] / Product Type: Medicare /  
 
ASSESSMENT:  
 
REHAB RECOMMENDATIONS:  
Recommendation to date pending progress: 
Setting:  Short-term Rehab Equipment:  To Be Determined PRIOR LEVEL OF FUNCTION: 
(Prior to Hospitalization) INITIAL/CURRENT LEVEL OF FUNCTION: 
(Most Recently Demonstrated) Bed Mobility: 
 Unknown Sit to Stand: 
 Unknown Transfers:  Unknown 
Gait/Mobility: 
 Unknown Bed Mobility:  Moderate Assistance Sit to Stand:  Not tested Transfers:  Not tested Gait/Mobility:  Not tested ASSESSMENT: 
Ms. Tanvir Blackman has extensive PMH with recent hospitalization due to COVID for 3 months and requiring intubation followed by stay at [facility] then rehab. Pt having difficulty with word finding this session but states she was at home with her daughter prior to admission, ambulating with rollator, no recent falls and not requiring supplemental O2. Unsure of accuracy at this time. Pt is able to follow commands appropriately but requires repetition and additional time to process and respond. Pt able to perform rolling R and L requiring modA and additional time to work through activity today. Noted tremors at B LE when attempting mobility today. Did not attempt supine> sit this session as pt would benefit from second assist for safety of pt and staff. Pt would benefit from continued skilled PT interventions while in acute setting and would benefit from STR at discharge. SUBJECTIVE:  
Ms. Tanvir Blackman states, \"ok. \" SOCIAL HISTORY/LIVING ENVIRONMENT:  
Home Environment: Private residence One/Two Story Residence: One story Living Alone: No 
Support Systems: Child(marga) Unsure of PLOF (states using rollator for gait, no falls) OBJECTIVE:  
 
PAIN: VITAL SIGNS: LINES/DRAINS:  
Pre Treatment: Pain Screen Pain Scale 1: Numeric (0 - 10) Pain Intensity 1: 0 Post Treatment: 2/10 (visually)   Clifton Catheter O2 Device: Nasal cannula (4L) GROSS EVALUATION: 
 Within Functional Limits Abnormal/ Functional Abnormal/ Non-Functional (see comments) Not Tested Comments: AROM [] [x] [] [] PROM [] [x] [] [] Strength [] [] [x] [] Balance [] [] [] [x] Posture [] [] [] [x] Sensation [] [] [] [x] Coordination [] [] [x] [] Tone [] [x] [] [] Increased tone/rigidity noted with PROM today Edema [] [] [] [x] Activity Tolerance [] [] [x] [] Closing eyes at times throughout session  
 [] [] [] [] COGNITION/ 
PERCEPTION: Intact Impaired  
(see comments) Comments:  
Orientation [x] [] Vision [x] [] Hearing [x] [] Command Following [x] [] Safety Awareness [x] []   
 [] [] MOBILITY: I Mod I S SBA CGA Min Mod Max Total  NT x2 Comments:  
Bed Mobility Rolling [] [] [] [] [] [] [x] [] [] [] [] Supine to Sit [] [] [] [] [] [] [] [] [] [x] [] Scooting [] [] [] [] [] [] [] [] [] [x] [] Sit to Supine [] [] [] [] [] [] [] [] [] [x] [] Transfers Sit to Stand [] [] [] [] [] [] [] [] [] [x] [] Bed to Chair [] [] [] [] [] [] [] [] [] [x] [] Stand to Sit [] [] [] [] [] [] [] [] [] [x] [] I=Independent, Mod I=Modified Independent, S=Supervision, SBA=Standby Assistance, CGA=Contact Guard Assistance,  
Min=Minimal Assistance, Mod=Moderate Assistance, Max=Maximal Assistance, Total=Total Assistance, NT=Not Tested GAIT: I Mod I S SBA CGA Min Mod Max Total  NT x2 Comments:  
Level of Assistance [] [] [] [] [] [] [] [] [] [x] [] Distance N/T   
DME N/A Gait Quality N/T Weightbearing Status N/A I=Independent, Mod I=Modified Independent, S=Supervision, SBA=Standby Assistance, CGA=Contact Guard Assistance,  
Min=Minimal Assistance, Mod=Moderate Assistance, Max=Maximal Assistance, Total=Total Assistance, NT=Not Tested [facility] Basic Mobility Inpatient Short Form How much difficulty does the patient currently have. .. Unable A Lot A Little None 1. Turning over in bed (including adjusting bedclothes, sheets and blankets)? [] 1   [x] 2   [] 3   [] 4  
2. Sitting down on and standing up from a chair with arms ( e.g., wheelchair, bedside commode, etc.)   [x] 1   [] 2   [] 3   [] 4  
3. Moving from lying on back to sitting on the side of the bed? [x] 1   [] 2   [] 3   [] 4 How much help from another person does the patient currently need. .. Total A Lot A Little None 4. Moving to and from a bed to a chair (including a wheelchair)? [x] 1   [] 2   [] 3   [] 4  
5. Need to walk in hospital room? [x] 1   [] 2   [] 3   [] 4  
6. Climbing 3-5 steps with a railing? [x] 1   [] 2   [] 3   [] 4  
© 2007, Trustees of [facility] [employer], under license to [facility]. All rights reserved Score:  Initial: 7 Most Recent: X (Date: -- ) Interpretation of Tool:  Represents activities that are increasingly more difficult (i.e. Bed mobility, Transfers, Gait). PLAN:  
FREQUENCY/DURATION: PT Plan of Care: 3 times/week for duration of hospital stay or until stated goals are met, whichever comes first. 
 
PROBLEM LIST:  
(Skilled intervention is medically necessary to address:) 1. Decreased ADL/Functional Activities 2. Decreased Activity Tolerance 3. Decreased AROM/PROM 4. Decreased Balance 5. Decreased Coordination 6. Decreased Gait Ability 7. Decreased Strength 8. Decreased Transfer Abilities 9. Increased Pain INTERVENTIONS PLANNED:  
(Benefits and precautions of physical therapy have been discussed with the patient.) 1. Therapeutic Activity 2. Therapeutic Exercise/HEP 3. Neuromuscular Re-education 4. Gait Training 5. Manual Therapy 6. Education TREATMENT:  
 
EVALUATION: Moderate Complexity : (Untimed Charge) TREATMENT:  
($$ Therapeutic Activity: 8-22 mins    ) Therapeutic Activity (10 minutes Minutes): Therapeutic activity included Rolling to improve functional Mobility, Strength, ROM and Activity tolerance. TREATMENT GRID: 
N/A 
 
AFTER TREATMENT POSITION/PRECAUTIONS: 
Bed, Needs within reach and Visitors at bedside INTERDISCIPLINARY COLLABORATION: 
RN/PCT 
 
TOTAL TREATMENT DURATION: 
PT Patient Time In/Time Out Time In: 0915 Time Out: [id number] Anna Trotter, PT, DPT

## 2021-04-18 NOTE — PROGRESS NOTES
No acute events overnight. Patient resting quietly in bed. Respirations present, even and unlabored on 4L NC. Bed low and locked, safety measures in place. Clifton in place, draining clear yellow urine. D5 infusing at 75mL/hr. No signs of distress, no needs expressed. Preparing to give report to oncoming RN.

## 2021-04-18 NOTE — PROGRESS NOTES
Bedside report received from night nurse Emanuel Hernández. Assessment done as noted  Had removed O2. Labored breathing noted. RA sat 74%. NC placed back to nares. Repositioned up in the bed. O2 sat slowly recovered to 95% on 4 liters O2. Will monitor closely. 0840 refuses PO meds. Insulins held because pt is not eating. Dr. Lexy Hernandez aware. Will continue to follow. 12 Dr Lexy Hernandez at bedside with pt. Hard to arouse. ABGs and X-ray ordered. 1118 - placed on BIPAP per RT. Dr. Lexy Hernandez spoke with daughter regarding goals of care and states, to discuss further when daughter gets here 1151 daughter at bedside. Dr. Lexy Hernandez made aware. Awaiting orders. 1209 to continue BIPAP for now and recheck ABGs in 4 hours. Johana Acharya RT made aware. Daughter remains at bedside. Will monitor closely.

## 2021-04-18 NOTE — PROGRESS NOTES
Unique Hospitalist Note Admit Date:  2021 11:15 AM  
Name:  Sotero March Age:  78 y.o. 
:  1940 MRN:  [id number] PCP:  Colby Gibson MD 
Treatment Team: Attending Provider: Keron Paredes MD; Primary Nurse: Hardeep Trinidad RN; Consulting Provider: Ai Lopez MD 
 
HPI/Subjective:  
Patient is a 78 y.o. female with medical h/o 3-month hospitalization for [id number] pneumonia complicated with ARDS, intubations, acute kidney injury on chronic requiring dialysis, baseline diabetes CKD CAD status post bypass graft. Patient was discharged to long-term acute care in March, and later discharged to facility for rehab, patient was found by daughter to be more altered, onset 1 week ago, slowly progressive, unable to formulate speech clearly at times, episodic, also with \"shaking episodes\" . She is recently treated for UTI as well. Patient is admitted due to altered mental status and hypoxia. : Patient was seen at the bedside in a.m. She was not responding to commands, not responding to sternal rub. Patient was tachypneic and breathing hard. On 2 L nasal cannula. Stat labs ordered. Chest x-ray revealed worsening infiltrates. Stat ABGs shows pH 7.26, PCO2 59.8, PO2 40, bicarb 26.6. Repeat blood gas with pH 7.29, PCO2 50, PO2 84, HCO3 23.8. Patient placed on BiPAP with plan to repeat ABGs in 4 hours. Pulmonology consulted. Patient is high risk to deteriorate and may require ICU care. ICU nurse at bedside to evaluate patient. I discussed in detail patient's current condition with the daughter earlier on phone and later at bedside. Discussed goals of care. Patient is DNR. Family would like to continue aggressive care for now and are open to discuss options including hospice/comfort care if no improvement in symptoms in  few hours Assessment and Plan:  
 
Acute on chronic respiratory failure with hypoxia and hypercapnia: ABG with decompensation respiratory acidosis, initially requiring BiPAP Continue nebs treatment as needed Incentive spirometry Wean oxygen as tolerated 4/18: Patient was tachypneic and breathing hard in a.m. On 2 L nasal cannula. Stat labs ordered. Chest x-ray revealed worsening infiltrates. Stat ABGs showed pH 7.26, PCO2 59.8, PO2 40, bicarb 26.6. Repeat blood gas with pH 7.29, PCO2 50, PO2 84, HCO3 23.8. Patient placed on BiPAP with plan to repeat ABGs in 4 hours. Pulmonology consulted. Patient is high risk to deteriorate and may require ICU care. ICU nurse at bedside to evaluate patient Bilateral airspace and interstitial opacities: 
Pulmonary edema versus atypical pneumonia Continue IV Lasix Strict ins and outs Procalcitonin negative Continue ceftriaxone/doxycycline for empiric coverage, de-escalate if indicated Follow-up on cultures, mycoplasma and pneumo antigen Acute on chronic congestive heart failure exacerbation with preserved EF: 
Continue IV Lasix, rosuvastatin, metoprolol Repeat echocardiogram on 4/17 with preserved ejection fraction and diastolic dysfunction Strict ins and outs Acute metabolic encephalopathy: 
Possibly secondary to hypoxia versus atypical pneumonia Treat underlying etiology Patient is high risk for delirium as well Delirium precautions Close monitoring Acute on chronic renal failure: 
Patient with history of transient hemodialysis x 3 last hospital stay Losartan held Monitor closely while diuresis Avoid nephrotoxic agent Chronic anemia: 
Stable Continue to monitor History of CAD status post CABG: 
Continue home aspirin,Plavix, metoprolol Hold losartan given PRATIMA Type 2 diabetes mellitus: 
Continue Lantus and SSI History of COVID-19 infection and ARDS requiring intubation: 
Monitor clinically Bilateral thyroid nodule: Outpatient evaluation Discharge planning: To be determined Total critical care time spent: 45 minutes Critical care time includes time spent at bedside performing history and exam, performing chart review, discussing findings and treatment plan with patient and/or family, discussing patient with consultants and colleagues, ordering and reviewing pertinent laboratory and radiographic evaluations, and discussing patient with nursing staff. Time excludes procedures. DVT ppx ordered Code status:  Full Estimated LOS:  Greater than 2 midnights Risk:  high Hospital Problems as of 4/18/2021 Date Reviewed: 2/5/2021 Codes Class Noted - Resolved POA * (Principal) CHF exacerbation ([facility] ICD-10-CM: I50.9 ICD-9-CM: 428.0  4/16/2021 - Present Unknown Acute hypoxemic respiratory failure ([facility] ICD-10-CM: J96.01 
ICD-9-CM: 518.81  12/24/2020 - Present Yes Acute on chronic renal failure (HCC) ICD-10-CM: N17.9, N18.9 ICD-9-CM: 584.9, 585.9  9/17/2019 - Present Yes Anemia ICD-10-CM: D64.9 ICD-9-CM: 285.9  8/8/2019 - Present Yes Coronary artery disease involving coronary bypass graft of native heart without angina pectoris (Chronic) ICD-10-CM: Q20.577 ICD-9-CM: 414.05  6/15/2015 - Present Yes Uncontrolled type 2 diabetes mellitus with hyperglycemia (HCC) (Chronic) ICD-10-CM: E11.65 ICD-9-CM: 250.02  6/15/2015 - Present Yes Essential hypertension (Chronic) ICD-10-CM: I10 
ICD-9-CM: 401.9  6/15/2015 - Present Yes HLD (hyperlipidemia) (Chronic) ICD-10-CM: K38.3 ICD-9-CM: 272.4  6/15/2015 - Present Yes 10 systems reviewed and negative except as noted in HPI. Past Medical History:  
Diagnosis Date  Acute cystitis without hematuria 1/30/2019  Acute pancreatitis 8/12/2019  CAD (coronary artery disease)  Diabetic ketoacidosis ([facility] 3/8/2020  DM2 (diabetes mellitus, type 2) ([facility]  Elevated liver function tests 8/8/2019  Encephalopathy 2/7/2021  Gout  Gram-negative bacteremia 8/9/2019  HLD (hyperlipidemia)  HTN (hypertension)  Peripheral neuropathy  Right lower quadrant abdominal pain 8/8/2019 Past Surgical History:  
Procedure Laterality Date  HX CHOLECYSTECTOMY jennifer in 1964  HX CORONARY ARTERY BYPASS GRAFT    
 x3  
 HX TUBAL LIGATION    
 IR INSERT NON TUNL CVC OVER 5 YRS  1/11/2021  FL CARDIAC SURG PROCEDURE UNLIST    
 stents x 3 2009 Allergies Allergen Reactions  Sulfa (Sulfonamide Antibiotics) Swelling Social History Tobacco Use  Smoking status: Never Smoker  Smokeless tobacco: Never Used Substance Use Topics  Alcohol use: Yes Comment: socially Family History Problem Relation Age of Onset  Hypertension Mother  Cancer Mother  Diabetes Mother  Heart Disease Mother Family history reviewed and noncontributory. Immunization History Administered Date(s) Administered  Influenza Vaccine 08/18/2014  Influenza Vaccine PF 10/18/2010, 11/09/2011, 10/03/2012  TB Skin Test (PPD) Intradermal 01/30/2019, 08/09/2019, 03/09/2020, 12/28/2020  Zoster Recombinant 07/10/2019 PTA Medications: 
Prior to Admission Medications Prescriptions Last Dose Informant Patient Reported? Taking? Blood-Glucose Meter (ONETOUCH ULTRAMINI) monitoring kit   No No  
Sig: Use as directed Insulin Needles, Disposable, 31 gauge x 5/16\" ndle   No No  
Sig: by SubCUTAneous route Before breakfast, lunch, dinner and at bedtime. Lancets (ONETOUCH ULTRASOFT LANCETS) misc   No No  
Sig: Test 4 times daily as directed  
aspirin delayed-release 81 mg tablet   Yes No  
Sig: Take  by mouth daily. atorvastatin (LIPITOR) 40 mg tablet Not Taking at Unknown time  Yes No  
Sig: Take 40 mg by mouth. clopidogrel (PLAVIX) 75 mg tab   Yes No  
Sig: Take 75 mg by mouth.  
gabapentin (NEURONTIN) 100 mg capsule   No No  
Sig: Take 1 Cap by mouth three (3) times daily. Patient taking differently: Take 100 mg by mouth two (2) times a day.   
glucose blood VI test strips (ONETOUCH ULTRA TEST) strip   No No Sig: Test 4 times daily as directed  
insulin glargine (LANTUS) 100 unit/mL injection   No No  
Si Units by SubCUTAneous route nightly. insulin lispro (HUMALOG) 100 unit/mL injection   No No  
Sig: 15 Units by SubCUTAneous route Before breakfast, lunch, and dinner. losartan (COZAAR) 100 mg tablet   Yes No  
Sig: Take  by mouth daily. metoprolol tartrate (LOPRESSOR) 25 mg tablet   No No  
Sig: Take 0.5 Tabs by mouth every twelve (12) hours. rOPINIRole (REQUIP) 0.5 mg tablet   No No  
Sig: Take 1 Tab by mouth nightly as needed. rosuvastatin (CRESTOR) 20 mg tablet   No No  
Sig: Take 1 Tab by mouth nightly. Facility-Administered Medications: None Objective:  
 
Patient Vitals for the past 24 hrs: 
 Temp Pulse Resp BP SpO2  
21 1243     98 % 21 1213     100 % 21 1206 98 °F (36.7 °C) 66 18 (!) 170/77 100 % 21 1118     95 % 21 0745 97.7 °F (36.5 °C) 86 18 (!) 162/80 93 % 21 0457 98 °F (36.7 °C) 89 17 (!) 150/74 90 % 21 2319 97.7 °F (36.5 °C) 60 17 128/78 96 % 21 1948 97.6 °F (36.4 °C) (!) 59 18 138/86 97 % 21 1542 97.9 °F (36.6 °C) 61 18 125/70 94 % Oxygen Therapy O2 Sat (%): 98 % (21) Pulse via Oximetry: 52 beats per minute (21) O2 Device: BIPAP (21) Skin Assessment: Clean, dry, & intact (21) Skin Protection for O2 Device: No (21) O2 Flow Rate (L/min): 4 l/min (21 0457) FIO2 (%): 30 % (21) Estimated body mass index is 23.2 kg/m² as calculated from the following: 
  Height as of this encounter: 5' 5\" (1.651 m). Weight as of this encounter: 63.2 kg (139 lb 6.4 oz). Intake/Output Summary (Last 24 hours) at 2021 1253 Last data filed at 2021 1207 Gross per 24 hour Intake 120 ml Output 2800 ml Net -2680 ml *Note that automatically entered I/Os may not be accurate; dependent on patient compliance with collection and accurate  by assistants. Physical Exam: 
General:    Ill looking, altered, not responding to command Eyes:   Normal sclerae. Extraocular movements intact. HENT:  Normocephalic, atraumatic. Moist mucous membranes CV:   RRR. No m/r/g. Lungs:  Scattered crackles bilateral 
Abdomen: Soft, nontender, nondistended. Extremities: Warm and dry. Neurologic: Not able to assess due to patient's current condition Skin:     No rashes or jaundice. Normal coloration Psych:  Altered I reviewed the labs, imaging, EKGs, telemetry, and other studies done this admission. Data Reviewed:  
Recent Results (from the past 24 hour(s)) GLUCOSE, POC Collection Time: 04/17/21  1:41 PM  
Result Value Ref Range Glucose (POC) 122 (H) 65 - 100 mg/dL Performed by [employer] METABOLIC PANEL, BASIC Collection Time: 04/17/21  4:16 PM  
Result Value Ref Range Sodium 147 (H) 136 - 145 mmol/L Potassium 5.2 (H) 3.5 - 5.1 mmol/L Chloride 120 (H) 98 - 107 mmol/L  
 CO2 21 21 - 32 mmol/L Anion gap 6 (L) 7 - 16 mmol/L Glucose 69 65 - 100 mg/dL BUN 68 (H) 8 - 23 MG/DL Creatinine 2.10 (H) 0.6 - 1.0 MG/DL  
 GFR est AA 29 (L) >60 ml/min/1.73m2 GFR est non-AA 24 (L) >60 ml/min/1.73m2 Calcium 8.5 8.3 - 10.4 MG/DL  
GLUCOSE, POC Collection Time: 04/17/21  4:35 PM  
Result Value Ref Range Glucose (POC) 72 65 - 100 mg/dL Performed by [employer] GLUCOSE, POC Collection Time: 04/17/21 11:03 PM  
Result Value Ref Range Glucose (POC) 95 65 - 100 mg/dL Performed by [employer] GLUCOSE, POC Collection Time: 04/18/21  5:47 AM  
Result Value Ref Range Glucose (POC) 76 65 - 100 mg/dL Performed by [employer] POC G3 Collection Time: 04/18/21  9:51 AM  
Result Value Ref Range Device: NASAL CANNULA  pH (POC) 7.26 (L) 7.35 - 7.45    
 pCO2 (POC) 59.8 (H) 35 - 45 MMHG  
 pO2 (POC) 40 (LL) 75 - 100 MMHG  
 HCO3 (POC) 26.6 (H) 22 - 26 MMOL/L  
 sO2 (POC) 65.0 (L) 95 - 98 % Base deficit (POC) 0.8 mmol/L Allens test (POC) NOT APPLICABLE Site RIGHT BRACHIAL Specimen type (POC) ARTERIAL Performed by Jermaine(Jamel)María Critical value read back ALEX   
 FIO2, L/min 2    
POC G3 Collection Time: 04/18/21 10:27 AM  
Result Value Ref Range Device: NASAL CANNULA pH (POC) 7.29 (L) 7.35 - 7.45    
 pCO2 (POC) 50.0 (H) 35 - 45 MMHG  
 pO2 (POC) 84 75 - 100 MMHG  
 HCO3 (POC) 23.8 22 - 26 MMOL/L  
 sO2 (POC) 94.7 (L) 95 - 98 % Base deficit (POC) 2.8 mmol/L Allens test (POC) Positive Specimen type (POC) ARTERIAL Performed by Jermaine(Jamel)María   
 FIO2, L/min 4    
CBC WITH AUTOMATED DIFF Collection Time: 04/18/21 11:11 AM  
Result Value Ref Range WBC 5.4 4.3 - 11.1 K/uL  
 RBC 2.40 (L) 4.05 - 5.2 M/uL HGB 7.7 (L) 11.7 - 15.4 g/dL HCT 25.4 (L) 35.8 - 46.3 % .8 (H) 79.6 - 97.8 FL  
 MCH 32.1 26.1 - 32.9 PG  
 MCHC 30.3 (L) 31.4 - 35.0 g/dL  
 RDW 19.8 (H) 11.9 - 14.6 % PLATELET 75 (L) 560 - 450 K/uL MPV 10.8 9.4 - 12.3 FL ABSOLUTE NRBC 0.04 0.0 - 0.2 K/uL  
 DF AUTOMATED NEUTROPHILS 72 43 - 78 % LYMPHOCYTES 16 13 - 44 % MONOCYTES 8 4.0 - 12.0 % EOSINOPHILS 2 0.5 - 7.8 % BASOPHILS 1 0.0 - 2.0 % IMMATURE GRANULOCYTES 1 0.0 - 5.0 %  
 ABS. NEUTROPHILS 3.9 1.7 - 8.2 K/UL  
 ABS. LYMPHOCYTES 0.9 0.5 - 4.6 K/UL  
 ABS. MONOCYTES 0.4 0.1 - 1.3 K/UL  
 ABS. EOSINOPHILS 0.1 0.0 - 0.8 K/UL  
 ABS. BASOPHILS 0.0 0.0 - 0.2 K/UL  
 ABS. IMM. GRANS. 0.1 0.0 - 0.5 K/UL METABOLIC PANEL, COMPREHENSIVE Collection Time: 04/18/21 11:11 AM  
Result Value Ref Range Sodium 145 136 - 145 mmol/L Potassium 4.6 3.5 - 5.1 mmol/L Chloride 115 (H) 98 - 107 mmol/L  
 CO2 28 21 - 32 mmol/L Anion gap 2 (L) 7 - 16 mmol/L Glucose 69 65 - 100 mg/dL BUN 57 (H) 8 - 23 MG/DL Creatinine 1.72 (H) 0.6 - 1.0 MG/DL  
 GFR est AA 37 (L) >60 ml/min/1.73m2  GFR est non-AA 30 (L) >60 ml/min/1.73m2 Calcium 8.6 8.3 - 10.4 MG/DL Bilirubin, total 0.4 0.2 - 1.1 MG/DL  
 ALT (SGPT) 31 12 - 65 U/L  
 AST (SGOT) 19 15 - 37 U/L Alk. phosphatase 108 50 - 136 U/L Protein, total 7.1 6.3 - 8.2 g/dL Albumin 3.4 3.2 - 4.6 g/dL Globulin 3.7 (H) 2.3 - 3.5 g/dL A-G Ratio 0.9 (L) 1.2 - 3.5 LACTIC ACID Collection Time: 04/18/21 11:11 AM  
Result Value Ref Range Lactic acid 0.5 0.4 - 2.0 MMOL/L  
TROPONIN-HIGH SENSITIVITY Collection Time: 04/18/21 11:11 AM  
Result Value Ref Range Troponin-High Sensitivity 94.2 (H) 0 - 14 pg/mL GLUCOSE, POC Collection Time: 04/18/21 12:04 PM  
Result Value Ref Range Glucose (POC) 84 65 - 100 mg/dL Performed by [employer] [employer] Results Procedure Component Value Units Date/Time CULTURE, BLOOD [[id number]] Collected: 04/16/21 1212 Order Status: Completed Specimen: Blood Updated: 04/18/21 1831 Special Requests: --     
  RIGHT Antecubital 
  
  Culture result: NO GROWTH 2 DAYS     
 CULTURE, BLOOD [[id number]] Collected: 04/16/21 1215 Order Status: Completed Specimen: Blood Updated: 04/18/21 1718 Special Requests: --     
  RIGHT 
ARM Culture result: NO GROWTH 2 DAYS S. [place]strip Outlaw, UR/CSF [[id number]] Collected: 04/17/21 1053 Order Status: Completed Specimen: Miscellaneous sample Updated: 04/18/21 9482 Source URINE Specimen Urine Streptococcus pneumoniae Ag Negative Fluid culture Not indicated. Organism ID Not indicated. Please note Comment Comment: (NOTE) College of American Pathologists standards require a culture to be 
performed on CSF specimens submitted for bacterial antigen testing. (CAP [id number]) Urine specimens will not be cultured. Performed At: [address] [id number] Lauri Leon MD LS:[id number] CULTURE, URINE [[id number]] Collected: 04/16/21 1300  Order Status: Completed Specimen: Urine from Clean catch Updated: 04/18/21 1311 Special Requests: NO SPECIAL REQUESTS Culture result: NO GROWTH 1 DAY     
 COVID-19 RAPID TEST [[id number]] Collected: 04/16/21 1403 Order Status: Completed Specimen: Nasopharyngeal Updated: 04/16/21 1434 Specimen source NASAL     
  COVID-19 rapid test Not detected Comment:     
The specimen is NEGATIVE for SARS-CoV-2, the novel coronavirus associated with COVID-19. A negative result does not rule out COVID-19. This test has been authorized by the FDA under an Emergency Use Authorization (EUA) for use by authorized laboratories. Fact sheet for Healthcare Providers: [employer].co[employer]nz Fact sheet for Patients: [employer] Methodology: Isothermal Nucleic Acid Amplification Current Facility-Administered Medications Medication Dose Route Frequency  sodium polystyrene (KAYEXALATE) 15 gram/60 mL oral suspension 15 g  15 g Oral NOW  dextrose 5% infusion  50 mL/hr IntraVENous CONTINUOUS  
 rOPINIRole (REQUIP) tablet 0.5 mg  0.5 mg Oral QHS PRN  
 rosuvastatin (CRESTOR) tablet 20 mg  20 mg Oral QHS  [Held by provider] gabapentin (NEURONTIN) capsule 100 mg  100 mg Oral BID  [Held by provider] insulin glargine (LANTUS) injection 15 Units  15 Units SubCUTAneous BID  clopidogreL (PLAVIX) tablet 75 mg  75 mg Oral DAILY  [Held by provider] insulin lispro (HUMALOG) injection 7 Units  7 Units SubCUTAneous TIDAC  metoprolol tartrate (LOPRESSOR) tablet 12.5 mg  12.5 mg Oral Q12H  aspirin delayed-release tablet 81 mg  81 mg Oral DAILY  sodium chloride (NS) flush 5-40 mL  5-40 mL IntraVENous Q8H  
 sodium chloride (NS) flush 5-40 mL  5-40 mL IntraVENous PRN  
 acetaminophen (TYLENOL) tablet 650 mg  650 mg Oral Q6H PRN Or  
 acetaminophen (TYLENOL) suppository 650 mg  650 mg Rectal Q6H PRN  polyethylene glycol (MIRALAX) packet 17 g  17 g Oral DAILY  senna-docusate (PERICOLACE) 8.6-50 mg per tablet 1 Tab  1 Tab Oral BID  magnesium hydroxide (MILK OF MAGNESIA) 400 mg/5 mL oral suspension 30 mL  30 mL Oral DAILY PRN  
 bisacodyL (DULCOLAX) suppository 10 mg  10 mg Rectal DAILY PRN  
 ondansetron (ZOFRAN ODT) tablet 4 mg  4 mg Oral Q8H PRN Or  
 ondansetron (ZOFRAN) injection 4 mg  4 mg IntraVENous Q6H PRN  
 heparin (porcine) injection 5,000 Units  5,000 Units SubCUTAneous Q8H  
 albumin human 25% (BUMINATE) solution 12.5 g  12.5 g IntraVENous BID  furosemide (LASIX) injection 40 mg  40 mg IntraVENous BID  cefTRIAXone (ROCEPHIN) 2 g in 0.9% sodium chloride (MBP/ADV) 50 mL MBP  2 g IntraVENous Q24H  
 doxycycline (VIBRAMYCIN) 100 mg in 0.9% sodium chloride (MBP/ADV) 100 mL MBP  100 mg IntraVENous Q12H  
 dextrose 40% (GLUTOSE) oral gel 1 Tube  15 g Oral PRN  
 glucagon (GLUCAGEN) injection 1 mg  1 mg IntraMUSCular PRN  
 dextrose (D50W) injection syrg 12.5-25 g  25-50 mL IntraVENous PRN  
 insulin lispro (HUMALOG) injection   SubCUTAneous AC&HS  famotidine (PEPCID) tablet 20 mg  20 mg Oral QPM  
 alcohol 62% (NOZIN) nasal  1 Ampule  1 Ampule Topical Q12H Other Studies: 
Results for orders placed or performed during the hospital encounter of 21  
2D ECHO COMPLETE ADULT (TTE) W OR WO CONTR Narrative [facility] [address] [place], [address] [facility] 
[phone] Transthoracic Echocardiogram 
2D, M-mode, Doppler, and Color Doppler Patient: Tyler Paige 
MR #: [id number] : [id number] Age: 78 years Gender: Female Study date: 2021 Account #: [de-identified] Height: 65 in 
Weight: 139.7 lb 
BSA: 1.7 mï¾² Status:Routine Location: 6 BP: 126/ 71 Allergies: SULFA (SULFONAMIDE ANTIBIOTICS) Sonographer:  Day Shelby RCS Group:  [facility] Referring Physician:  Joseph Contreras MD 
Reading Physician:  Wally Vaz Jose Su MD 
 
INDICATIONS: Cardiomegaly PROCEDURE: This was a routine study. A transthoracic echocardiogram was 
performed. The study included complete 2D imaging, M-mode, complete spectral 
Doppler, and color Doppler. Image quality was adequate. LEFT VENTRICLE: Size was normal. Systolic function was normal. Ejection 
fraction was estimated in the range of 55 % to 60 %. There were no regional 
wall motion abnormalities. Wall thickness was mildly increased. Doppler 
parameters were consistent with diastolic dysfunction. Avg E/e': 29.9. RIGHT VENTRICLE: The size was normal. Systolic function was normal. Estimated 
peak pressure was in the range of 50-55 mmHg. LEFT ATRIUM: The atrium was moderately dilated. RIGHT ATRIUM: The atrium was moderately dilated. SYSTEMIC VEINS: IVC: The inferior vena cava was dilated. AORTIC VALVE: The valve was trileaflet. Leaflets exhibited mild sclerosis. There was no evidence for stenosis. There was trivial regurgitation. MITRAL VALVE: There was moderate annular calcification. There was no evidence 
for stenosis. There was mild to moderate regurgitation. TRICUSPID VALVE: The valve structure was normal. There was no evidence for 
stenosis. There was moderate regurgitation. PULMONIC VALVE: The valve structure was normal. There was no evidence for 
stenosis. There was mild regurgitation. PERICARDIUM: There was no pericardial effusion. AORTA: The root exhibited normal size. SUMMARY: 
 
-  Left ventricle: Systolic function was normal. Ejection fraction was 
estimated in the range of 55 % to 60 %. There were no regional wall motion 
abnormalities. Wall thickness was mildly increased. -  Right ventricle: Estimated peak pressure was in the range of 50-55 mmHg. -  Left atrium: The atrium was moderately dilated. -  Right atrium: The atrium was moderately dilated. -  Inferior vena cava, hepatic veins: The inferior vena cava was dilated.  
 
- Mitral valve: There was moderate annular calcification. There was mild to 
moderate regurgitation. 
 
-  Tricuspid valve: There was moderate regurgitation. SYSTEM MEASUREMENT TABLES 
 
2D mode AoR Diam (2D): 2.7 cm 
LA Dimension (2D): 4.3 cm Left Atrium Systolic Volume Index; Method of Disks, Biplane; 2D mode;: 50.1 
ml/m2 IVS/LVPW (2D): 1.4 IVSd (2D): 1.5 cm LVIDd (2D): 4.6 cm 
LVIDs (2D): 3.1 cm 
LVOT Area (2D): 3.1 cm2 LVPWd (2D): 1.1 cm RVIDd (2D): 3.1 cm Unspecified Scan Mode Peak Grad; Mean; Antegrade Flow: 5 mm[Hg] Vmax; Antegrade Flow: 116 cm/s LVOT Diam: 2 cm Prepared and signed by 
 
Laura Landry MD 
Signed 17-Apr-2021 18:16:40 Xr Chest Sngl V Result Date: 4/18/2021 Portable AP semiupright view  Date:  4/18/2021  at 1002 hours comparison chest x-ray : 4/16/2021  Clinical Information: Shortness of breath Findings: Metallic sternal wires are present. Heart is unchanged in size, enlarged. Pulmonary vascularity is prominent congested. Lungs are poorly expanded with patchy infiltrates in both lungs. Mild increase in the pulmonary infiltrates. Small bilateral pleural effusion. Increasing pulmonary infiltrates SARS-CoV-2 Lab Results \"Novel Coronavirus\" Test: No results found for: COV2NT \"Emergent Disease\" Test: No results found for: EDPR \"SARS-COV-2\" Test: No results found for: XGCOVT Rapid Test:  
Lab Results Component Value Date/Time COVR Not detected 04/16/2021 02:03 PM  
 
  
 
 
  
Part of this note was written by using a voice dictation software and the note has been proof read but may still contain some grammatical/other typographical errors.  
 
Signed: 
Tristan Hutton MD

## 2021-04-18 NOTE — PROGRESS NOTES
Report received from [facility]. Patient resting quietly in bed. Respirations present, even and unlabored on 4L NC. Bed low and locked, safety measures in place. [place] in place, draining clear yellow urine. D5 infusing at 75mL/hr. No signs of distress, no needs expressed. Call light within reach, encouraged patient to call with any needs. Will continue to monitor.

## 2021-04-18 NOTE — PROGRESS NOTES
Resting quietly at present. NAD noted. Safety maintained through out the shift. No change in assessment. Remains on BIPAP. Daughter Pedro Ortiz at bedside and states \"will let you know about comfort care after I talk to my sister\". To report off to on coming nurse.

## 2021-04-18 NOTE — CONSULTS
CONSULT NOTE Faviola Zuluaga 4/18/2021 Date of Admission:  4/16/2021 The patient's chart is reviewed and the patient is discussed with the staff. Subjective:  
 
Patient is a 78 y.o.  female seen and evaluated at the request of Dr. Swati Lyon. Patient is a 78 y. o. female with medical h/o 3-month hospitalization for [id number] pneumonia complicated with ARDS, intubations, acute kidney injury on chronic requiring dialysis, baseline diabetes CKD CAD status post bypass graft. Patient was discharged to long-term acute care in March, and later discharged to facility for rehab, patient was found by daughter to be more altered, onset 1 week ago, slowly progressive, unable to formulate speech clearly at times, episodic, also with \"shaking episodes\" . She is recently treated for UTI as well. Patient is admitted due to altered mental status and hypoxia. She is known to us from previous prolonged hospitalization with complications from COVID PNA and ARDS, had a tracheostomy and spent time in [facility] before trasfer to NH. Her condition has worsened in house with hypercapnic respiratory failure and she is currently DNR and on NIPPV. She is very physically and mentally debilitated from her covid ordeal. 
 
Diffuse pulmonary infiltrates were noted and she seems not to have improved with diuresis Review of Systems Review of systems not obtained due to patient factors. Patient Active Problem List  
Diagnosis Code  Diabetes mellitus with hyperosmolarity without hyperglycemic hyperosmolar nonketotic coma ([facility] [address] E11.00  Coronary artery disease involving coronary bypass graft of native heart without angina pectoris I25.810  
 Uncontrolled type 2 diabetes mellitus with hyperglycemia (HCC) E11.65  
 Essential hypertension I10  
 HLD (hyperlipidemia) E78.5  Chronic kidney disease, stage III (moderate) ([facility]) N18.30  
 Osteopenia M85.80  Vitamin D deficiency E55.9  Gastroesophageal reflux disease without esophagitis K21.9  Other allergic rhinitis J30.89  Peripheral neuropathy G62.9  
 Primary osteoarthritis involving multiple joints M89.49  
 Insomnia G47.00  RLS (restless legs syndrome) G25.81  
 Anemia D64.9  Generalized weakness R53.1  Decreased oral intake R63.8  Acute on chronic renal failure (HCC) N17.9, N18.9  Noncompliance Z91.19  
 Hypoalbuminemia due to protein-calorie malnutrition ([facility] [address] E88.09, E46  
 COVID-19 U07.1  Acute respiratory distress syndrome (ARDS) due to severe acute respiratory syndrome coronavirus 2 (SARS-CoV-2) ([facility]) U07.1, J80  Acute hypoxemic respiratory failure ([facility]) J96.01  
 Cardiac arrest ([facility]) I46.9  CHF exacerbation ([facility]) I50.9 Prior to Admission Medications Prescriptions Last Dose Informant Patient Reported? Taking? Blood-Glucose Meter (ONETOUCH ULTRAMINI) monitoring kit   No No  
Sig: Use as directed Insulin Needles, Disposable, 31 gauge x 5/16\" ndle   No No  
Sig: by SubCUTAneous route Before breakfast, lunch, dinner and at bedtime. Lancets (ONETOUCH ULTRASOFT LANCETS) misc   No No  
Sig: Test 4 times daily as directed  
aspirin delayed-release 81 mg tablet   Yes No  
Sig: Take  by mouth daily. atorvastatin (LIPITOR) 40 mg tablet Not Taking at Unknown time  Yes No  
Sig: Take 40 mg by mouth. clopidogrel (PLAVIX) 75 mg tab   Yes No  
Sig: Take 75 mg by mouth.  
gabapentin (NEURONTIN) 100 mg capsule   No No  
Sig: Take 1 Cap by mouth three (3) times daily. Patient taking differently: Take 100 mg by mouth two (2) times a day. glucose blood VI test strips (ONETOUCH ULTRA TEST) strip   No No  
Sig: Test 4 times daily as directed  
insulin glargine (LANTUS) 100 unit/mL injection   No No  
Si Units by SubCUTAneous route nightly. insulin lispro (HUMALOG) 100 unit/mL injection   No No  
Sig: 15 Units by SubCUTAneous route Before breakfast, lunch, and dinner.   
losartan (COZAAR) 100 mg tablet   Yes No  
Sig: Take  by mouth daily. metoprolol tartrate (LOPRESSOR) 25 mg tablet   No No  
Sig: Take 0.5 Tabs by mouth every twelve (12) hours. rOPINIRole (REQUIP) 0.5 mg tablet   No No  
Sig: Take 1 Tab by mouth nightly as needed. rosuvastatin (CRESTOR) 20 mg tablet   No No  
Sig: Take 1 Tab by mouth nightly. Facility-Administered Medications: None Past Medical History:  
Diagnosis Date  Acute cystitis without hematuria 1/30/2019  Acute pancreatitis 8/12/2019  CAD (coronary artery disease)  Diabetic ketoacidosis ([facility] [address] 3/8/2020  DM2 (diabetes mellitus, type 2) ([facility] [address]  Elevated liver function tests 8/8/2019  Encephalopathy 2/7/2021  Gout  Gram-negative bacteremia 8/9/2019  HLD (hyperlipidemia)  HTN (hypertension)  Peripheral neuropathy  Right lower quadrant abdominal pain 8/8/2019 Past Surgical History:  
Procedure Laterality Date  HX CHOLECYSTECTOMY jennifer in 1964  HX CORONARY ARTERY BYPASS GRAFT    
 x3  
 HX TUBAL LIGATION    
 IR INSERT NON TUNL CVC OVER 5 YRS  1/11/2021  DE CARDIAC SURG PROCEDURE UNLIST    
 stents x 3 2009 Social History Socioeconomic History  Marital status: SINGLE Spouse name: Not on file  Number of children: Not on file  Years of education: Not on file  Highest education level: Not on file Occupational History  Occupation: retired  Social Needs  Financial resource strain: Not on file  Food insecurity Worry: Not on file Inability: Not on file  Transportation needs Medical: Not on file Non-medical: Not on file Tobacco Use  Smoking status: Never Smoker  Smokeless tobacco: Never Used Substance and Sexual Activity  Alcohol use: Yes Comment: socially  Drug use: No  
 Sexual activity: Not on file Lifestyle  Physical activity Days per week: Not on file Minutes per session: Not on file  Stress: Not on file  
Relationships  Social connections Talks on phone: Not on file Gets together: Not on file Attends [employer] service: Not on file Active member of club or organization: Not on file Attends meetings of clubs or organizations: Not on file Relationship status: Not on file  Intimate partner violence Fear of current or ex partner: Not on file Emotionally abused: Not on file Physically abused: Not on file Forced sexual activity: Not on file Other Topics Concern  Not on file Social History Narrative Lives alone but has a caregiver who helps several hours per day, several days per week Family History Problem Relation Age of Onset  Hypertension Mother  Cancer Mother  Diabetes Mother  Heart Disease Mother Allergies Allergen Reactions  Sulfa (Sulfonamide Antibiotics) Swelling Current Facility-Administered Medications Medication Dose Route Frequency  sodium polystyrene (KAYEXALATE) 15 gram/60 mL oral suspension 15 g  15 g Oral NOW  dextrose 5% infusion  50 mL/hr IntraVENous CONTINUOUS  
 rOPINIRole (REQUIP) tablet 0.5 mg  0.5 mg Oral QHS PRN  
 rosuvastatin (CRESTOR) tablet 20 mg  20 mg Oral QHS  [Held by provider] gabapentin (NEURONTIN) capsule 100 mg  100 mg Oral BID  [Held by provider] insulin glargine (LANTUS) injection 15 Units  15 Units SubCUTAneous BID  clopidogreL (PLAVIX) tablet 75 mg  75 mg Oral DAILY  [Held by provider] insulin lispro (HUMALOG) injection 7 Units  7 Units SubCUTAneous TIDAC  metoprolol tartrate (LOPRESSOR) tablet 12.5 mg  12.5 mg Oral Q12H  aspirin delayed-release tablet 81 mg  81 mg Oral DAILY  sodium chloride (NS) flush 5-40 mL  5-40 mL IntraVENous Q8H  
 sodium chloride (NS) flush 5-40 mL  5-40 mL IntraVENous PRN  
 acetaminophen (TYLENOL) tablet 650 mg  650 mg Oral Q6H PRN Or  
 acetaminophen (TYLENOL) suppository 650 mg  650 mg Rectal Q6H PRN  polyethylene glycol (MIRALAX) packet 17 g  17 g Oral DAILY  senna-docusate (PERICOLACE) 8.6-50 mg per tablet 1 Tab  1 Tab Oral BID  magnesium hydroxide (MILK OF MAGNESIA) 400 mg/5 mL oral suspension 30 mL  30 mL Oral DAILY PRN  
 bisacodyL (DULCOLAX) suppository 10 mg  10 mg Rectal DAILY PRN  
 ondansetron (ZOFRAN ODT) tablet 4 mg  4 mg Oral Q8H PRN Or  
 ondansetron (ZOFRAN) injection 4 mg  4 mg IntraVENous Q6H PRN  
 heparin (porcine) injection 5,000 Units  5,000 Units SubCUTAneous Q8H  
 albumin human 25% (BUMINATE) solution 12.5 g  12.5 g IntraVENous BID  furosemide (LASIX) injection 40 mg  40 mg IntraVENous BID  cefTRIAXone (ROCEPHIN) 2 g in 0.9% sodium chloride (MBP/ADV) 50 mL MBP  2 g IntraVENous Q24H  
 doxycycline (VIBRAMYCIN) 100 mg in 0.9% sodium chloride (MBP/ADV) 100 mL MBP  100 mg IntraVENous Q12H  
 dextrose 40% (GLUTOSE) oral gel 1 Tube  15 g Oral PRN  
 glucagon (GLUCAGEN) injection 1 mg  1 mg IntraMUSCular PRN  
 dextrose (D50W) injection syrg 12.5-25 g  25-50 mL IntraVENous PRN  
 insulin lispro (HUMALOG) injection   SubCUTAneous AC&HS  famotidine (PEPCID) tablet 20 mg  20 mg Oral QPM  
 alcohol 62% (NOZIN) nasal  1 Ampule  1 Ampule Topical Q12H Objective:  
 
Vitals:  
 04/18/21 1118 04/18/21 1206 04/18/21 1213 04/18/21 1243 BP:  (!) 170/77 Pulse:  66 Resp:  18 Temp:  98 °F (36.7 °C) SpO2: 95% 100% 100% 98% Weight:      
Height: PHYSICAL EXAM  
 
Gen:  the patient is in no acute distress on NIPPV not very responsive or following commands HEENT:  Sclera clear, pupils equal, oral mucosa moist 
Lungs: Coarse bilaterally Heart:  RRR without M,G,R 
Abd: soft and non-tender; with positive bowel sounds. Ext: warm without cyanosis. There is 1+ lower leg edema. Skin:  no jaundice or rashes, no wounds Neuro: no gross neuro deficits   Moves all 4 extremities. Psychiatric:  alert and oriented x 0 Chest X-ray:   
 
 
Recent Labs 04/18/21 
1111 04/17/21 
0755 04/16/21 
1128 WBC 5.4 4.5 7.0 HGB 7.7* 8.1* 8.4* HCT 25.4* 26.7* 27.5*  
PLT 75* 89* 106* Recent Labs 04/18/21 
1111 04/17/21 
1616 04/17/21 
0755 04/16/21 
1128  147* 146* 145  
K 4.6 5.2* 5.3* 6.0*  
* 120* 116* 117* GLU 69 69 128* 47* CO2 28 21 23 25 BUN 57* 68* 73* 74* CREA 1.72* 2.10* 2.26* 2.38* MG  --   --  2.7* 2.8*  
CA 8.6 8.5 8.7 9.1 ALB 3.4  --  3.1* 3.3 Results for Quique Freeman (MRN [id number]) as of 4/18/2021 12:50 Ref. Range 4/18/2021 09:51 4/18/2021 10:27  
pH (POC) Latest Ref Range: 7.35 - 7.45   7.26 (L) 7.29 (L)  
pCO2 (POC) Latest Ref Range: 35 - 45 MMHG 59.8 (H) 50.0 (H)  
pO2 (POC) Latest Ref Range: 75 - 100 MMHG 40 (LL) 84 HCO3 (POC) Latest Ref Range: 22 - 26 MMOL/L 26.6 (H) 23.8  
sO2 (POC) Latest Ref Range: 95 - 98 % 65.0 (L) 94.7 (L) Base deficit (POC) Latest Units: mmol/L 0.8 2.8  
FIO2, L/min Latest Units:   2 4 Specimen type (POC) Latest Units:   ARTERIAL ARTERIAL Site Latest Units:   RIGHT BRACHIAL Device: Latest Units:   NASAL CANNULA NASAL CANNULA Allens test (POC) Latest Units:   NOT APPLICABLE Positive No results for input(s): PH, PCO2, PO2, HCO3 in the last 72 hours. Assessment:  (Medical Decision Making) Patient Active Problem List  
Diagnosis Code  Diabetes mellitus with hyperosmolarity without hyperglycemic hyperosmolar nonketotic coma ([facility] E11.00  Coronary artery disease involving coronary bypass graft of native heart without angina pectoris I25.810  
 Uncontrolled type 2 diabetes mellitus with hyperglycemia ([facility]) E11.65  
 Essential hypertension I10  
 HLD (hyperlipidemia) E78.5  Chronic kidney disease, stage III (moderate) ([facility]) N18.30  
 Osteopenia M85.80  Vitamin D deficiency E55.9  Gastroesophageal reflux disease without esophagitis K21.9  Other allergic rhinitis J30.89  Peripheral neuropathy G62.9  
 Primary osteoarthritis involving multiple joints M89.49  
 Insomnia G47.00  RLS (restless legs syndrome) G25.81  
 Anemia D64.9  Generalized weakness R53.1  Decreased oral intake R63.8  Acute on chronic renal failure (HCC) N17.9, N18.9  Noncompliance Z91.19  
 Hypoalbuminemia due to protein-calorie malnutrition ([facility] E88.09, E46  
 COVID-19 U07.1  Acute respiratory distress syndrome (ARDS) due to severe acute respiratory syndrome coronavirus 2 (SARS-CoV-2) ([facility]) U07.1, J80  Acute hypoxemic respiratory failure ([facility]) J96.01  
 Cardiac arrest ([facility]) I46.9  CHF exacerbation ([facility]) I50.9 Plan:  (Medical Decision Making) Hospital Problems  Date Reviewed: 2/5/2021 Codes Class Noted POA * (Principal) CHF exacerbation ([facility] ICD-10-CM: I50.9 ICD-9-CM: 428.0  4/16/2021 Unknown Acute hypoxemic respiratory failure ([facility] ICD-10-CM: J96.01 
ICD-9-CM: 518.81  12/24/2020 Yes Acute on chronic renal failure (HCC) ICD-10-CM: N17.9, N18.9 ICD-9-CM: 584.9, 585.9  9/17/2019 Yes Anemia ICD-10-CM: D64.9 ICD-9-CM: 285.9  8/8/2019 Yes Coronary artery disease involving coronary bypass graft of native heart without angina pectoris (Chronic) ICD-10-CM: D16.513 ICD-9-CM: 414.05  6/15/2015 Yes Uncontrolled type 2 diabetes mellitus with hyperglycemia (HCC) (Chronic) ICD-10-CM: E11.65 ICD-9-CM: 250.02  6/15/2015 Yes Essential hypertension (Chronic) ICD-10-CM: I10 
ICD-9-CM: 401.9  6/15/2015 Yes HLD (hyperlipidemia) (Chronic) ICD-10-CM: Q00.4 ICD-9-CM: 272.4  6/15/2015 Yes The patient is hypoventilating with resultant hypercapnic acidosis likely due to severe debility with =ongoing CHF decompensation. She is not responding well to Rx and is requiring BiPAP. She is DNR and her condition was discussed with daughter at the bedside in detail. Daughter reaffirmed DNR status and after discussion with me, if BiPAP is ineffective, will transition to comfort care for patient.  
She would also not want tube feedings for patient who has been strugeling with post covid recovery in the past 4 months. If still acidotic after 4 hrs of NIPPV, then would stop BiPAP place on NC and implement comfort care. Discussed with her Nurse Daina Garzon and RT Would not transfer Pt to ICU setting at present Thank you very much for this referral.  We appreciate the opportunity to participate in this patient's care.    
 
 
 
Gallo Pack MD

## 2021-04-18 NOTE — PROGRESS NOTES
Resting quietly at present. NAD noted. Safety maintained through out the shift. Has had poor appetite during this shift. Not taking PO. Has not taken any PO meds this shift. D5 infusing per order. To report off to on coming nurse.

## 2021-04-18 NOTE — PROGRESS NOTES
unsuccesful attempt x3 trying to get ABG, called RT supervisor to see if he could attempt. He stated he would be here in a few minutes.

## 2021-04-18 NOTE — PROGRESS NOTES
Results for Kasia Campoverde (MRN [id number]) as of 4/18/2021 18:37 Ref. Range 4/18/2021 16:02  
pH (POC) Latest Ref Range: 7.35 - 7.45   7.31 (L)  
pCO2 (POC) Latest Ref Range: 35 - 45 MMHG 51.9 (H)  
pO2 (POC) Latest Ref Range: 75 - 100 MMHG 55 (L) HCO3 (POC) Latest Ref Range: 22 - 26 MMOL/L 26.2 (H)  
sO2 (POC) Latest Ref Range: 95 - 98 % 84.6 (L) Base deficit (POC) Latest Units: mmol/L 0.2 FIO2 (POC) Latest Units: % 30 Specimen type (POC) Latest Units:   ARTERIAL Site Latest Units:   RIGHT RADIAL Device: Latest Units:   BIPAP MASK Mode Latest Units:   SIMV PEEP/CPAP (POC) Latest Units: cmH2O 8 Pressure support Latest Units: cmH2O 8 Allens test (POC) Latest Units:   Positive Respiratory comment: Unknown 7. 2VE Blood gas results as above. Remains on BIPAP at present. Messaged Dr. Da Gilman via perfect serve. Awaiting orders. 1637 per Dr. Da Gilman who states that she has discussed with the daughter and she will talk to her sister and will let nursing know if they want comfort care. Will monitor closely 1832 lengthy conversation with daughter regarding goals of care. Papi Benavidez states \"will be back in morning with decision regarding comfort measure\". Daughter made aware that pt remains on BIPAP at present and if unable to wean off BIPAP, pt will need to be transferred to ICU for further care. Daughter states \"will call aunt and talk to her and will let nursing know as soon as possible. Maggie Gilman regarding pt remaining on BIPAP at present. Awaiting orders. [address] Per Dr. Jeremiah Elliott regarding ICU transfer or attempt to wean off BIPAP. Awaiting orders. 1900 Dr. Jonny Rinne satates \"no ICU\". Messaged Dr. Da Gilman and made aware. No new orders received.

## 2021-04-19 NOTE — PROGRESS NOTES
Problem: Falls - Risk of 
Goal: *Absence of Falls Description: Document Venice Adamsrocio Fall Risk and appropriate interventions in the flowsheet. Outcome: Progressing Towards Goal 
Note: Fall Risk Interventions: 
  
 
Mentation Interventions: Adequate sleep, hydration, pain control Medication Interventions: Evaluate medications/consider consulting pharmacy Elimination Interventions: Patient to call for help with toileting needs History of Falls Interventions: Evaluate medications/consider consulting pharmacy

## 2021-04-19 NOTE — PROGRESS NOTES
MSN, CM:  Spoke with daughter this AM about discharge planning. Patient unable to answer any questions at this time. Patient was a [facility] prior to admission and is bed bound at this time. Patient requires assistance from facility staff. Patient was admitted to [facility] in 3/8/2021. Patient requires 3L O2 continuous. Discharge plan is to return to [facility]. PT and OT consulted for evaluation and recommendations. Case Management will continue to follow for discharge needs. Care Management Interventions PCP Verified by CM: Yes(Dr. Juan M Domingo) Mode of Transport at Discharge: BLS([facility]) Transition of Care Consult (CM Consult): SNF Partner SNF: No 
Reason Why Partner SNF Not Chosen: Positive previous encounter Physical Therapy Consult: Yes Occupational Therapy Consult: Yes Current Support Network: [address] [place] [address] Confirm Follow Up Transport: (facility MD) Melvin of Choice List was Provided with Basic Dialogue that Supports the Patient's Individualized Plan of Care/Goals, Treatment Preferences and Shares the Quality Data Associated with the Providers?: Yes Discharge Location Discharge Placement: Skilled nursing facility

## 2021-04-19 NOTE — PROGRESS NOTES
Xochitl Curiel Admission Date: 4/16/2021 Daily Progress Note: 4/19/2021 The patient's chart is reviewed and the patient is discussed with the staff. 78 y.o.  female seen and evaluated at the request of Dr. Melanie Braswell. Patient is a 78 y. o. female with medical h/o 3-month hospitalization for [id number] pneumonia complicated with ARDS, intubations, acute kidney injury on chronic requiring dialysis, baseline diabetes CKD CAD status post bypass graft. Patient was discharged to long-term acute care in March, and later discharged to facility for rehab, patient was found by daughter to be more altered, onset 1 week ago, slowly progressive, unable to formulate speech clearly at times, episodic, also with \"shaking episodes\" .  She is recently treated for UTI as well. Larry Day is admitted due to altered mental status and hypoxia.   
  
She is known to us from previous prolonged hospitalization with complications from COVID PNA and ARDS, had a tracheostomy and spent time in [facility] before trasfer to NH. Her condition has worsened in house with hypercapnic respiratory failure and she is currently DNR and on NIPPV. She is very physically and mentally debilitated from her covid ordeal. 
  
Diffuse pulmonary infiltrates were noted and she seems not to have improved with diuresis 
  
 
Subjective: On 4L O2 NC, BIPAP overnight Afebrile with some elevated BPs Awake and alert, eating salad with help of granddaughter. Ongoing issue regarding who should be POA. Patient pointing to granddaughter to be POA when asked. Current Facility-Administered Medications Medication Dose Route Frequency  dextrose 5% infusion  25 mL/hr IntraVENous CONTINUOUS  
 rOPINIRole (REQUIP) tablet 0.5 mg  0.5 mg Oral QHS PRN  
 rosuvastatin (CRESTOR) tablet 20 mg  20 mg Oral QHS  [Held by provider] gabapentin (NEURONTIN) capsule 100 mg  100 mg Oral BID  [Held by provider] insulin glargine (LANTUS) injection 15 Units  15 Units SubCUTAneous BID  clopidogreL (PLAVIX) tablet 75 mg  75 mg Oral DAILY  [Held by provider] insulin lispro (HUMALOG) injection 7 Units  7 Units SubCUTAneous TIDAC  metoprolol tartrate (LOPRESSOR) tablet 12.5 mg  12.5 mg Oral Q12H  aspirin delayed-release tablet 81 mg  81 mg Oral DAILY  sodium chloride (NS) flush 5-40 mL  5-40 mL IntraVENous Q8H  
 sodium chloride (NS) flush 5-40 mL  5-40 mL IntraVENous PRN  
 acetaminophen (TYLENOL) tablet 650 mg  650 mg Oral Q6H PRN Or  
 acetaminophen (TYLENOL) suppository 650 mg  650 mg Rectal Q6H PRN  polyethylene glycol (MIRALAX) packet 17 g  17 g Oral DAILY  senna-docusate (PERICOLACE) 8.6-50 mg per tablet 1 Tab  1 Tab Oral BID  magnesium hydroxide (MILK OF MAGNESIA) 400 mg/5 mL oral suspension 30 mL  30 mL Oral DAILY PRN  
 bisacodyL (DULCOLAX) suppository 10 mg  10 mg Rectal DAILY PRN  
 ondansetron (ZOFRAN ODT) tablet 4 mg  4 mg Oral Q8H PRN Or  
 ondansetron (ZOFRAN) injection 4 mg  4 mg IntraVENous Q6H PRN  
 heparin (porcine) injection 5,000 Units  5,000 Units SubCUTAneous Q8H  
 furosemide (LASIX) injection 40 mg  40 mg IntraVENous BID  cefTRIAXone (ROCEPHIN) 2 g in 0.9% sodium chloride (MBP/ADV) 50 mL MBP  2 g IntraVENous Q24H  
 doxycycline (VIBRAMYCIN) 100 mg in 0.9% sodium chloride (MBP/ADV) 100 mL MBP  100 mg IntraVENous Q12H  
 dextrose 40% (GLUTOSE) oral gel 1 Tube  15 g Oral PRN  
 glucagon (GLUCAGEN) injection 1 mg  1 mg IntraMUSCular PRN  
 dextrose (D50W) injection syrg 12.5-25 g  25-50 mL IntraVENous PRN  
 insulin lispro (HUMALOG) injection   SubCUTAneous AC&HS  famotidine (PEPCID) tablet 20 mg  20 mg Oral QPM  
 alcohol 62% (NOZIN) nasal  1 Ampule  1 Ampule Topical Q12H Review of Systems Constitutional: negative for fever, chills, sweats Cardiovascular: negative for chest pain, palpitations, syncope, edema Gastrointestinal:  negative for dysphagia, reflux, vomiting, diarrhea, abdominal pain, or melena Neurologic:  negative for focal weakness, numbness, headache Objective:  
 
Vitals:  
 04/19/21 0409 04/19/21 0732 04/19/21 0834 04/19/21 1154 BP: 139/75  (!) 152/77 (!) 155/81 Pulse: 64  (!) 102 70 Resp: 24  22 20 Temp: 97.7 °F (36.5 °C)  97.8 °F (36.6 °C) 97.4 °F (36.3 °C) SpO2: 99% 100% 100% 100% Weight: 155 lb 14.4 oz (70.7 kg) Height:      
 
 
 
Intake/Output Summary (Last 24 hours) at 4/19/2021 1158 Last data filed at 4/19/2021 8798 Gross per 24 hour Intake 60 ml Output 3500 ml Net -3440 ml Physical Exam:  
Constitution:  the patient is well developed and in no acute distress EENMT:  Sclera clear, pupils equal, oral mucosa moist 
Respiratory: On 4L O2 NC, no wheezing Cardiovascular:  RRR without M,G,R 
Gastrointestinal: soft and non-tender; with positive bowel sounds. Musculoskeletal: warm without cyanosis. There is trace lower extremity edema. Skin:  no jaundice or rashes, no wounds Neurologic: no gross neuro deficits Psychiatric:  alert and awake CXR:  4/18/2021 4/16/2021 LAB Recent Labs 04/19/21 
1136 04/19/21 
0545 04/18/21 
2122 04/18/21 
1634 04/18/21 
1204 GLUCPOC 107* 81 83 97 84 Recent Labs 04/19/21 
0705 04/18/21 
1111 04/17/21 
6681 WBC 3.1* 5.4 4.5 HGB 7.9* 7.7* 8.1* HCT 25.4* 25.4* 26.7*  
PLT 80* 75* 89* Recent Labs 04/19/21 
8492 04/18/21 
1111 04/17/21 
1616 04/17/21 
2718  145 147* 146*  
K 4.2 4.6 5.2* 5.3*  
* 115* 120* 116* CO2 29 28 21 23 GLU 74 69 69 128* BUN 48* 57* 68* 73* CREA 1.41* 1.72* 2.10* 2.26* MG  --   --   --  2.7*  
CA 9.3 8.6 8.5 8.7 ALB 3.2 3.4  --  3.1* TBILI 0.4 0.4  --  0.3 ALT 27 31  --  36 Recent Labs 04/19/21 
0618 04/18/21 
1602 04/18/21 
1027 PHI 7.34* 7.31* 7.29* PCO2I 53.9* 51.9* 50.0*  
PO2I 94 55* 84 HCO3I 29.3* 26.2* 23.8 Recent Labs   04/18/21 
1111 04/16/21 [phone] 04/16/21 
1212 LAC 0.5 0.7 0.8 Assessment:  (Medical Decision Making) Hospital Problems  Date Reviewed: 4/19/2021 Codes Class Noted POA * (Principal) [facility] ICD-10-CM: I50.9 ICD-9-CM: 428.0  4/16/2021 Unknown Acute hypoxemic respiratory failure ([facility] ICD-10-CM: J96.01 
ICD-9-CM: 518.81  12/24/2020 Yes Acute on chronic renal failure (HCC) ICD-10-CM: N17.9, N18.9 ICD-9-CM: 584.9, 585.9  9/17/2019 Yes Anemia ICD-10-CM: D64.9 ICD-9-CM: 285.9  8/8/2019 Yes Coronary artery disease involving coronary bypass graft of native heart without angina pectoris (Chronic) ICD-10-CM: T03.924 ICD-9-CM: 414.05  6/15/2015 Yes Uncontrolled type 2 diabetes mellitus with hyperglycemia (HCC) (Chronic) ICD-10-CM: E11.65 ICD-9-CM: 250.02  6/15/2015 Yes Essential hypertension (Chronic) ICD-10-CM: I10 
ICD-9-CM: 401.9  6/15/2015 Yes HLD (hyperlipidemia) (Chronic) ICD-10-CM: G56.7 ICD-9-CM: 272.4  6/15/2015 Yes Plan:  (Medical Decision Making) --Patient currently on 4L O2 NC, continue 
--No plans for transferring patient to ICU if condition worsens 
--Patient is a DNR currently, some issues regarding who should be POA More than 50% of the time documented was spent in face-to-face contact with the patient and in the care of the patient on the floor/unit where the patient is located. Zaid Morgan, CHER Lungs:clear Heart:  RRR with no Murmur/Rubs/Gallops Additional Comments:   Pt unable to talk, currently appears comfortable on nc at 4 L,  Continue bipap hs prn, no code I have spoken with and examined the patient. I agree with the above assessment and plan as documented.  
 
Jamey Anderson MD

## 2021-04-19 NOTE — PROGRESS NOTES
Pt resting in bed comfortably at this time, pt does not answer questions, unable to assess mental status. No distress noted, respirations even and unlabored on 4 L NC, continuous pulse ox showing 95% or above oxygen saturation. Clif[place] in place and draining appropriately. Pt denies pain at this time. Pt instructed to call for assistance if needed, call light in place, will continue to monitor.

## 2021-04-19 NOTE — PROGRESS NOTES
Report received from [facility]. Patient resting quietly in bed. Respirations present, even and labored on BIPAP mask. Bed low and locked, safety measures in place. Clifton in place, patent and draining clear yellow urine. No signs of distress, no needs noted by this RN. Will continue to monitor.

## 2021-04-19 NOTE — PROGRESS NOTES
Pt resting in bed comfortably at this time, unable to assess mental status, pt non verbal. Grimaces to painful stimuli. Pt transitioned over from BIPAP to 4 L NC by RT, pt tolerating well. Continuous pulse ox above 90%. Door open for continuous monitoring.

## 2021-04-19 NOTE — PROGRESS NOTES
Patient is on continuous BiPap and has been since 1100 on 4/18. Family is contemplating code status depending on how well the patient is handling the BiPap. This RN messaged the on call MD to request an ABG to be done so that the family can make an informed decision. New orders received from Dr. Daisy Obrien to get an ABG at 0600. Will continue to monitor.

## 2021-04-19 NOTE — PROGRESS NOTES
No acute events overnight. Patient resting quietly in bed. Respirations present, even and labored on BiPap mask. ABG performed. RT Nadege informed Charge nurse Mickeal Peers RN that she will notify the day shift team that the patient can come off of the bipap mask and be put back on a nasal cannula. Bed low and locked, safety measures in place. Clifton in place, patent and draining clear yellow urine. No signs of distress, no needs noted by this RN. Preparing to give report to oncoming RN.

## 2021-04-19 NOTE — PROGRESS NOTES
Pt resting in bed comfortably at this time, alert and oriented with periodic confusion. No distress noted, respirations even and unlabored on 4 L NC. Pt denies pain at this time. Pt instructed to call for assistance if needed, call light in place, will continue to monitor. Will prepare for bedside shift report. Clifton draining appropriately.

## 2021-04-19 NOTE — PROGRESS NOTES
04/18/21 2148 Oxygen Therapy O2 Sat (%) 97 % Pulse via Oximetry 64 beats per minute O2 Device BIPAP  
FIO2 (%) 30 % Respiratory Respiratory (WDL) X Respiratory Pattern Regular Chest/Tracheal Assessment Chest expansion, symmetrical  
Breath Sounds Bilateral Diminished CPAP/BIPAP  
CPAP/BIPAP Start/Stop On Device Mode BIPAP;S/T Mask Type and Size Full face Skin Condition intact PIP Observed 16 cm H20 IPAP (cm H2O) 16 cm H2O  
EPAP (cm H2O) 8 cm H2O Inspiratory Time (sec) 1 seconds Vt Spont (ml) 616 ml Ve Observed (l/min) 14 l/min Backup Rate 22 Total RR (Spontaneous) 24 breaths per minute Insp Rise Time (sec) 3 Leak (Estimated) 32 L/min  
Pt's Home Machine No  
Biomedical Check Performed Yes Settings Verified Yes Alarm Settings High Pressure 50 Low Pressure 8 Apnea 20 Low Ve 3 High Rate 50 Low Rate 8 Pulmonary Toilet Pulmonary Toilet H. O.B elevated

## 2021-04-19 NOTE — PROGRESS NOTES
Unique Hospitalist Note Admit Date:  2021 11:15 AM  
Name:  Xochitl Curiel Age:  78 y.o. 
:  1940 MRN:  [id number] PCP:  Stephanie Reynaga MD 
Treatment Team: Attending Provider: Akua Barrios MD; Consulting Provider: Shahab Mcgarry MD; Primary Nurse: Geovanna Jansen, RN; Utilization Review: Destiney Watson HPI/Subjective:  
Patient is a 78 y.o. female with medical h/o 3-month hospitalization for QVLEL-40 pneumonia complicated with ARDS, intubations, acute kidney injury on chronic requiring dialysis, baseline diabetes CKD CAD status post bypass graft. Patient was discharged to long-term acute care in March, and later discharged to facility for rehab, patient was found by daughter to be more altered, onset 1 week ago, slowly progressive, unable to formulate speech clearly at times, episodic, also with \"shaking episodes\" . She is recently treated for UTI as well. Patient is admitted due to altered mental status and hypoxia. Patient condition worsened on  with hypercapnic respiratory failure and was started on BiPAP. Patient is very physically and mentally debilitated since Covid infection. Diffuse pulmonary infiltrate but not improved with diuresis. Pulmonology consulted and recommend BiPAP at bedtime as needed. Patient is DNR. 
 
: Patient is seen at the bedside. More alert today. Overnight was on BiPAP. Currently on 4 L nasal cannula. Seems more comfortable today. Nonverbal.  Nodding her head for questions. Still with poor intake. Daughter is at the bedside updated her on patient's current condition. Patient is DNR but daughter wants to continue current care. Assessment and Plan:  
 
Acute on chronic respiratory failure with hypoxia and hypercapnia: ABG with decompensation respiratory acidosis, initially requiring BiPAP Continue nebs treatment as needed Incentive spirometry Wean oxygen as tolerated BiPAP at bedtime as needed Pulmonology following We will consult palliative care to discuss goal of care Bilateral airspace and interstitial opacities: 
Pulmonary edema versus atypical pneumonia Continue IV Lasix Strict ins and outs Procalcitonin negative Continue ceftriaxone/doxycycline for empiric coverage, de-escalate if indicated Follow-up on cultures, mycoplasma and pneumo antigen Acute on chronic congestive heart failure exacerbation with preserved EF: 
Continue IV Lasix, rosuvastatin, metoprolol Repeat echocardiogram on 4/17 with preserved ejection fraction and diastolic dysfunction Strict ins and outs Acute metabolic encephalopathy: 
Possibly secondary to hypoxia and hypercapnia Treat underlying etiology Acute on chronic renal failure: 
Patient with history of transient hemodialysis x 3 last hospital stay Losartan held Monitor closely while diuresis Avoid nephrotoxic agent Chronic anemia: 
Stable Continue to monitor History of CAD status post CABG: 
Continue home aspirin,Plavix, metoprolol Hold losartan given PRATIMA Type 2 diabetes mellitus: 
Continue Lantus and SSI History of COVID-19 infection and ARDS requiring intubation: 
Monitor clinically Bilateral thyroid nodule: Outpatient evaluation Discharge planning: To be determined, will involve palliative care Patient is high complexity given severe physically and mentally debilitation since COVID-19 infection and acute on chronic hypoxic hypercapnic respiratory failure DVT ppx ordered Code status:  Full Estimated LOS:  Greater than 2 midnights Risk:  high Hospital Problems as of 4/19/2021 Date Reviewed: 4/19/2021 Codes Class Noted - Resolved POA * (Principal) CHF exacerbation ([facility] ICD-10-CM: I50.9 ICD-9-CM: 428.0  4/16/2021 - Present Unknown Acute hypoxemic respiratory failure ([facility] ICD-10-CM: J96.01 
ICD-9-CM: 518.81  12/24/2020 - Present Yes Acute on chronic renal failure (HCC) ICD-10-CM: N17.9, N18.9 ICD-9-CM: 584.9, 585.9  9/17/2019 - Present Yes Anemia ICD-10-CM: D64.9 ICD-9-CM: 285.9  8/8/2019 - Present Yes Coronary artery disease involving coronary bypass graft of native heart without angina pectoris (Chronic) ICD-10-CM: E15.403 ICD-9-CM: 414.05  6/15/2015 - Present Yes Uncontrolled type 2 diabetes mellitus with hyperglycemia (HCC) (Chronic) ICD-10-CM: E11.65 ICD-9-CM: 250.02  6/15/2015 - Present Yes Essential hypertension (Chronic) ICD-10-CM: I10 
ICD-9-CM: 401.9  6/15/2015 - Present Yes HLD (hyperlipidemia) (Chronic) ICD-10-CM: J96.2 ICD-9-CM: 272.4  6/15/2015 - Present Yes 10 systems reviewed and negative except as noted in HPI. Past Medical History:  
Diagnosis Date  Acute cystitis without hematuria 1/30/2019  Acute pancreatitis 8/12/2019  CAD (coronary artery disease)  Diabetic ketoacidosis ([facility] 3/8/2020  DM2 (diabetes mellitus, type 2) ([facility]  Elevated liver function tests 8/8/2019  Encephalopathy 2/7/2021  Gout  Gram-negative bacteremia 8/9/2019  HLD (hyperlipidemia)  HTN (hypertension)  Peripheral neuropathy  Right lower quadrant abdominal pain 8/8/2019 Past Surgical History:  
Procedure Laterality Date  HX CHOLECYSTECTOMY jennifer in 1964  HX CORONARY ARTERY BYPASS GRAFT    
 x3  
 HX TUBAL LIGATION    
 IR INSERT NON TUNL CVC OVER 5 YRS  1/11/2021  [place] CARDIAC SURG PROCEDURE UNLIST    
 stents x 3 2009 Allergies Allergen Reactions  Sulfa (Sulfonamide Antibiotics) Swelling Social History Tobacco Use  Smoking status: Never Smoker  Smokeless tobacco: Never Used Substance Use Topics  Alcohol use: Yes Comment: socially Family History Problem Relation Age of Onset  Hypertension Mother  Cancer Mother  Diabetes Mother  Heart Disease Mother Family history reviewed and noncontributory. Immunization History Administered Date(s) Administered  Influenza Vaccine 2014  Influenza Vaccine PF 10/18/2010, 2011, 10/03/2012  TB Skin Test (PPD) Intradermal 2019, 2019, 2020, 2020  Zoster Recombinant 07/10/2019 PTA Medications: 
Prior to Admission Medications Prescriptions Last Dose Informant Patient Reported? Taking? Blood-Glucose Meter (ONETOUCH ULTRAMINI) monitoring kit   No No  
Sig: Use as directed Insulin Needles, Disposable, 31 gauge x 5/16\" ndle   No No  
Sig: by SubCUTAneous route Before breakfast, lunch, dinner and at bedtime. Lancets (ONETOUCH ULTRASOFT LANCETS) misc   No No  
Sig: Test 4 times daily as directed  
aspirin delayed-release 81 mg tablet   Yes No  
Sig: Take  by mouth daily. atorvastatin (LIPITOR) 40 mg tablet Not Taking at Unknown time  Yes No  
Sig: Take 40 mg by mouth. clopidogrel (PLAVIX) 75 mg tab   Yes No  
Sig: Take 75 mg by mouth.  
gabapentin (NEURONTIN) 100 mg capsule   No No  
Sig: Take 1 Cap by mouth three (3) times daily. Patient taking differently: Take 100 mg by mouth two (2) times a day. glucose blood VI test strips (ONETOUCH ULTRA TEST) strip   No No  
Sig: Test 4 times daily as directed  
insulin glargine (LANTUS) 100 unit/mL injection   No No  
Si Units by SubCUTAneous route nightly. insulin lispro (HUMALOG) 100 unit/mL injection   No No  
Sig: 15 Units by SubCUTAneous route Before breakfast, lunch, and dinner. losartan (COZAAR) 100 mg tablet   Yes No  
Sig: Take  by mouth daily. metoprolol tartrate (LOPRESSOR) 25 mg tablet   No No  
Sig: Take 0.5 Tabs by mouth every twelve (12) hours. rOPINIRole (REQUIP) 0.5 mg tablet   No No  
Sig: Take 1 Tab by mouth nightly as needed. rosuvastatin (CRESTOR) 20 mg tablet   No No  
Sig: Take 1 Tab by mouth nightly. Facility-Administered Medications: None Objective:  
 
Patient Vitals for the past 24 hrs: 
 Temp Pulse Resp BP SpO2  
21 1154 97.4 °F (36.3 °C) 70 20 (!) 155/81 100 % 21 0834 97.8 °F (36.6 °C) (!) 102 22 (!) 152/77 100 % 04/19/21 0732     100 % 04/19/21 0409 97.7 °F (36.5 °C) 64 24 139/75 99 % 04/19/21 0136     96 % 04/18/21 2303 97.7 °F (36.5 °C) 77 23 (!) 144/72 97 % 04/18/21 2148     97 % 04/18/21 2006 97.8 °F (36.6 °C) 69 20 (!) 157/70 98 % 04/18/21 1604 97.7 °F (36.5 °C) 65 22 (!) 144/74 98 % Oxygen Therapy O2 Sat (%): 100 % (04/19/21 1154) Pulse via Oximetry: 78 beats per minute (04/19/21 0732) O2 Device: Nasal cannula (04/19/21 0732) Skin Assessment: Clean, dry, & intact (04/17/21 1948) Skin Protection for O2 Device: No (04/17/21 1948) O2 Flow Rate (L/min): 4 l/min (04/19/21 0732) FIO2 (%): 30 % (04/19/21 0136) Estimated body mass index is 25.94 kg/m² as calculated from the following: 
  Height as of this encounter: 5' 5\" (1.651 m). Weight as of this encounter: 70.7 kg (155 lb 14.4 oz). Intake/Output Summary (Last 24 hours) at 4/19/2021 1454 Last data filed at 4/19/2021 3978 Gross per 24 hour Intake 60 ml Output 4100 ml Net -4040 ml  
   
*Note that automatically entered I/Os may not be accurate; dependent on patient compliance with collection and accurate  by assistants. Physical Exam: 
General:    Ill looking, altered, not responding to command Eyes:   Normal sclerae. Extraocular movements intact. HENT:  Normocephalic, atraumatic. Moist mucous membranes CV:   RRR. No m/r/g. Lungs:  Scattered crackles bilateral 
Abdomen: Soft, nontender, nondistended. Extremities: Warm and dry. Neurologic: Not able to assess due to patient's current condition Skin:     No rashes or jaundice. Normal coloration Psych:  Altered I reviewed the labs, imaging, EKGs, telemetry, and other studies done this admission. Data Reviewed:  
Recent Results (from the past 24 hour(s)) POC G3 Collection Time: 04/18/21  4:02 PM  
Result Value Ref Range Device: BIPAP MASK    
 FIO2 (POC) 30 %  pH (POC) 7.31 (L) 7.35 - 7.45    
 pCO2 (POC) 51.9 (H) 35 - 45 MMHG  
 pO2 (POC) 55 (L) 75 - 100 MMHG  
 HCO3 (POC) 26.2 (H) 22 - 26 MMOL/L  
 sO2 (POC) 84.6 (L) 95 - 98 % Base deficit (POC) 0.2 mmol/L Mode SIMV PEEP/CPAP (POC) 8 cmH2O Pressure support 8 cmH2O Allens test (POC) Positive Site RIGHT RADIAL Specimen type (POC) ARTERIAL Performed by Ford (Wallace) Respiratory comment: 7. 2VE GLUCOSE, POC Collection Time: 04/18/21  4:34 PM  
Result Value Ref Range Glucose (POC) 97 65 - 100 mg/dL Performed by Mirking GLUCOSE, POC Collection Time: 04/18/21  9:22 PM  
Result Value Ref Range Glucose (POC) 83 65 - 100 mg/dL Performed by Ford   
GLUCOSE, POC Collection Time: 04/19/21  5:45 AM  
Result Value Ref Range Glucose (POC) 81 65 - 100 mg/dL Performed by Ford   
POC G3 Collection Time: 04/19/21  6:18 AM  
Result Value Ref Range Device: BIPAP MASK    
 FIO2 (POC) 30 % pH (POC) 7.34 (L) 7.35 - 7.45    
 pCO2 (POC) 53.9 (H) 35 - 45 MMHG  
 pO2 (POC) 94 75 - 100 MMHG  
 HCO3 (POC) 29.3 (H) 22 - 26 MMOL/L  
 sO2 (POC) 96.6 95 - 98 % Base excess (POC) 2.5 mmol/L Tidal volume 409 ml PEEP/CPAP (POC) 8 cmH2O  
 PIP (POC) 16 Allens test (POC) NOT APPLICABLE Inspiratory Time 1.0 sec Site RIGHT BRACHIAL Specimen type (POC) ARTERIAL Performed by Enid Gibson Respiratory comment: 10.4 CBC WITH AUTOMATED DIFF Collection Time: 04/19/21  6:54 AM  
Result Value Ref Range WBC 3.1 (L) 4.3 - 11.1 K/uL  
 RBC 2.45 (L) 4.05 - 5.2 M/uL HGB 7.9 (L) 11.7 - 15.4 g/dL HCT 25.4 (L) 35.8 - 46.3 % .7 (H) 79.6 - 97.8 FL  
 MCH 32.2 26.1 - 32.9 PG  
 MCHC 31.1 (L) 31.4 - 35.0 g/dL  
 RDW 18.8 (H) 11.9 - 14.6 % PLATELET 80 (L) 834 - 450 K/uL MPV 10.9 9.4 - 12.3 FL ABSOLUTE NRBC 0.04 0.0 - 0.2 K/uL  
 DF AUTOMATED NEUTROPHILS 60 43 - 78 % LYMPHOCYTES 24 13 - 44 %  MONOCYTES 10 4.0 - 12.0 % EOSINOPHILS 4 0.5 - 7.8 % BASOPHILS 1 0.0 - 2.0 % IMMATURE GRANULOCYTES 1 0.0 - 5.0 %  
 ABS. NEUTROPHILS 1.9 1.7 - 8.2 K/UL  
 ABS. LYMPHOCYTES 0.8 0.5 - 4.6 K/UL  
 ABS. MONOCYTES 0.3 0.1 - 1.3 K/UL  
 ABS. EOSINOPHILS 0.1 0.0 - 0.8 K/UL  
 ABS. BASOPHILS 0.0 0.0 - 0.2 K/UL  
 ABS. IMM. GRANS. 0.0 0.0 - 0.5 K/UL METABOLIC PANEL, COMPREHENSIVE Collection Time: 04/19/21  6:54 AM  
Result Value Ref Range Sodium 145 136 - 145 mmol/L Potassium 4.2 3.5 - 5.1 mmol/L Chloride 114 (H) 98 - 107 mmol/L  
 CO2 29 21 - 32 mmol/L Anion gap 2 (L) 7 - 16 mmol/L Glucose 74 65 - 100 mg/dL BUN 48 (H) 8 - 23 MG/DL Creatinine 1.41 (H) 0.6 - 1.0 MG/DL  
 GFR est AA 46 (L) >60 ml/min/1.73m2 GFR est non-AA 38 (L) >60 ml/min/1.73m2 Calcium 9.3 8.3 - 10.4 MG/DL Bilirubin, total 0.4 0.2 - 1.1 MG/DL  
 ALT (SGPT) 27 12 - 65 U/L  
 AST (SGOT) 17 15 - 37 U/L Alk. phosphatase 107 50 - 136 U/L Protein, total 6.9 6.3 - 8.2 g/dL Albumin 3.2 3.2 - 4.6 g/dL Globulin 3.7 (H) 2.3 - 3.5 g/dL A-G Ratio 0.9 (L) 1.2 - 3.5 GLUCOSE, POC Collection Time: 04/19/21 11:36 AM  
Result Value Ref Range Glucose (POC) 107 (H) 65 - 100 mg/dL Performed by [employer] Results Procedure Component Value Units Date/Time CULTURE, BLOOD [[id number]] Collected: 04/16/21 1215 Order Status: Completed Specimen: Blood Updated: 04/19/21 1053 Special Requests: --     
  RIGHT 
ARM Culture result: NO GROWTH 3 DAYS     
 CULTURE, BLOOD [[id number]] Collected: 04/16/21 1212 Order Status: Completed Specimen: Blood Updated: 04/19/21 1057 Special Requests: --     
  RIGHT Antecubital 
  
  Culture result: NO GROWTH 3 DAYS     
 CULTURE, URINE [[id number]] Collected: 04/16/21 1300 Order Status: Completed Specimen: Urine from Clean catch Updated: 04/19/21 5965 Special Requests: NO SPECIAL REQUESTS Culture result: NO GROWTH 2 DAYS S. Shellie Severance, UR/CSF [[id number]] Collected: 04/17/21 1053 Order Status: Completed Specimen: Miscellaneous sample Updated: 04/18/21 3225 Source URINE Specimen Urine Streptococcus pneumoniae Ag Negative Fluid culture Not indicated. Organism ID Not indicated. Please note Comment Comment: (NOTE) College of American [employer]ologists standards require a culture to be 
performed on CSF specimens submitted for bacterial antigen testing. (CAP [id number]) Urine specimens will not be cultured. Performed At: [address] [id number] Garima Barrett MD UA:[id number] COVID-19 RAPID TEST [[id number]] Collected: 04/16/21 1403 Order Status: Completed Specimen: Nasopharyngeal Updated: 04/16/21 1434 Specimen source NASAL     
  COVID-19 rapid test Not detected Comment:     
The specimen is NEGATIVE for SARS-CoV-2, the novel coronavirus associated with COVID-19. A negative result does not rule out COVID-19. This test has been authorized by the FDA under an Emergency Use Authorization (EUA) for use by authorized laboratories. Fact sheet for Healthcare Providers: ConventionUpdate.co.nz Fact sheet for Patients: ConventionUpdate.co.nz Methodology: Isothermal Nucleic Acid Amplification Current Facility-Administered Medications Medication Dose Route Frequency  dextrose 5% infusion  25 mL/hr IntraVENous CONTINUOUS  
 rOPINIRole (REQUIP) tablet 0.5 mg  0.5 mg Oral QHS PRN  
 rosuvastatin (CRESTOR) tablet 20 mg  20 mg Oral QHS  [Held by provider] gabapentin (NEURONTIN) capsule 100 mg  100 mg Oral BID  [Held by provider] insulin glargine (LANTUS) injection 15 Units  15 Units SubCUTAneous BID  clopidogreL (PLAVIX) tablet 75 mg  75 mg Oral DAILY  [Held by provider] insulin lispro (HUMALOG) injection 7 Units  7 Units SubCUTAneous TIDAC  metoprolol tartrate (LOPRESSOR) tablet 12.5 mg  12.5 mg Oral Q12H  aspirin delayed-release tablet 81 mg  81 mg Oral DAILY  sodium chloride (NS) flush 5-40 mL  5-40 mL IntraVENous Q8H  
 sodium chloride (NS) flush 5-40 mL  5-40 mL IntraVENous PRN  
 acetaminophen (TYLENOL) tablet 650 mg  650 mg Oral Q6H PRN Or  
 acetaminophen (TYLENOL) suppository 650 mg  650 mg Rectal Q6H PRN  polyethylene glycol (MIRALAX) packet 17 g  17 g Oral DAILY  senna-docusate (PERICOLACE) 8.6-50 mg per tablet 1 Tab  1 Tab Oral BID  magnesium hydroxide (MILK OF MAGNESIA) 400 mg/5 mL oral suspension 30 mL  30 mL Oral DAILY PRN  
 bisacodyL (DULCOLAX) suppository 10 mg  10 mg Rectal DAILY PRN  
 ondansetron (ZOFRAN ODT) tablet 4 mg  4 mg Oral Q8H PRN Or  
 ondansetron (ZOFRAN) injection 4 mg  4 mg IntraVENous Q6H PRN  
 heparin (porcine) injection 5,000 Units  5,000 Units SubCUTAneous Q8H  
 furosemide (LASIX) injection 40 mg  40 mg IntraVENous BID  cefTRIAXone (ROCEPHIN) 2 g in 0.9% sodium chloride (MBP/ADV) 50 mL MBP  2 g IntraVENous Q24H  
 doxycycline (VIBRAMYCIN) 100 mg in 0.9% sodium chloride (MBP/ADV) 100 mL MBP  100 mg IntraVENous Q12H  
 dextrose 40% (GLUTOSE) oral gel 1 Tube  15 g Oral PRN  
 glucagon (GLUCAGEN) injection 1 mg  1 mg IntraMUSCular PRN  
 dextrose (D50W) injection syrg 12.5-25 g  25-50 mL IntraVENous PRN  
 insulin lispro (HUMALOG) injection   SubCUTAneous AC&HS  famotidine (PEPCID) tablet 20 mg  20 mg Oral QPM  
 alcohol 62% (NOZIN) nasal  1 Ampule  1 Ampule Topical Q12H Other Studies: 
Results for orders placed or performed during the hospital encounter of 21  
2D ECHO COMPLETE ADULT (TTE) W OR WO CONTR Narrative [place] [facility] [address] [place], [address] [facility] 
[phone] Transthoracic Echocardiogram 
2D, M-mode, Doppler, and Color Doppler Patient: Denton Coffman 
MR #: [id number] : [id number] Age: 78 years Gender: Female Study date: 17-Apr-2021 Account #: [de-identified] Height: 65 in 
Weight: 139.7 lb 
BSA: 1.7 mï¾² Status:Routine Location: 806 BP: 126/ 71 Allergies: SULFA (SULFONAMIDE ANTIBIOTICS) Sonographer:  AALIYAH Macias Group:  [facility] Referring Physician:  Perla Pascual MD 
Reading Physician:  Mackenzie Pham MD 
 
INDICATIONS: Cardiomegaly PROCEDURE: This was a routine study. A transthoracic echocardiogram was 
performed. The study included complete 2D imaging, M-mode, complete spectral 
Doppler, and color Doppler. Image quality was adequate. LEFT VENTRICLE: Size was normal. Systolic function was normal. Ejection 
fraction was estimated in the range of 55 % to 60 %. There were no regional 
wall motion abnormalities. Wall thickness was mildly increased. Doppler 
parameters were consistent with diastolic dysfunction. Avg E/e': 29.9. RIGHT VENTRICLE: The size was normal. Systolic function was normal. Estimated 
peak pressure was in the range of 50-55 mmHg. LEFT ATRIUM: The atrium was moderately dilated. RIGHT ATRIUM: The atrium was moderately dilated. SYSTEMIC VEINS: IVC: The inferior vena cava was dilated. AORTIC VALVE: The valve was trileaflet. Leaflets exhibited mild sclerosis. There was no evidence for stenosis. There was trivial regurgitation. MITRAL VALVE: There was moderate annular calcification. There was no evidence 
for stenosis. There was mild to moderate regurgitation. TRICUSPID VALVE: The valve structure was normal. There was no evidence for 
stenosis. There was moderate regurgitation. PULMONIC VALVE: The valve structure was normal. There was no evidence for 
stenosis. There was mild regurgitation. PERICARDIUM: There was no pericardial effusion. AORTA: The root exhibited normal size. SUMMARY: 
 
-  Left ventricle: Systolic function was normal. Ejection fraction was 
estimated in the range of 55 % to 60 %.  There were no regional wall motion 
abnormalities. Wall thickness was mildly increased. -  Right ventricle: Estimated peak pressure was in the range of 50-55 mmHg. -  Left atrium: The atrium was moderately dilated. -  Right atrium: The atrium was moderately dilated. -  Inferior vena cava, hepatic veins: The inferior vena cava was dilated. -  Mitral valve: There was moderate annular calcification. There was mild to 
moderate regurgitation. 
 
-  Tricuspid valve: There was moderate regurgitation. SYSTEM MEASUREMENT TABLES 
 
2D mode AoR Diam (2D): 2.7 cm 
LA Dimension (2D): 4.3 cm Left Atrium Systolic Volume Index; Method of Disks, Biplane; 2D mode;: 50.1 
ml/m2 IVS/LVPW (2D): 1.4 IVSd (2D): 1.5 cm LVIDd (2D): 4.6 cm 
LVIDs (2D): 3.1 cm 
LVOT Area (2D): 3.1 cm2 LVPWd (2D): 1.1 cm RVIDd (2D): 3.1 cm Unspecified Scan Mode Peak Grad; Mean; Antegrade Flow: 5 mm[Hg] Vmax; Antegrade Flow: 116 cm/s LVOT Diam: 2 cm Prepared and signed by 
 
Nixon Winston MD 
Signed 17-Apr-2021 18:16:40 No results found. SARS-CoV-2 Lab Results \"Novel Coronavirus\" Test: No results found for: COV2NT \"Emergent Disease\" Test: No results found for: EDPR \"SARS-COV-2\" Test: No results found for: XGCOVT Rapid Test:  
Lab Results Component Value Date/Time COVR Not detected 04/16/2021 02:03 PM  
 
  
 
 
  
Part of this note was written by using a voice dictation software and the note has been proof read but may still contain some grammatical/other typographical errors.  
 
Signed: 
Mihir Webb MD

## 2021-04-20 NOTE — PROGRESS NOTES
Shahbaz Reyes Admission Date: 4/16/2021 Daily Progress Note: 4/20/2021 The patient's chart is reviewed and the patient is discussed with the staff. 78 y.o.  female seen and evaluated at the request of Dr. Mignon Gutierrez. Patient is a 78 y. o. female with medical h/o 3-month hospitalization for PLAIZ-86 pneumonia complicated with ARDS, intubations, acute kidney injury on chronic requiring dialysis, baseline diabetes CKD CAD status post bypass graft. Patient was discharged to long-term acute care in March, and later discharged to facility for rehab, patient was found by daughter to be more altered, onset 1 week ago, slowly progressive, unable to formulate speech clearly at times, episodic, also with \"shaking episodes\" .  She is recently treated for UTI as well. Moi Jose is admitted due to altered mental status and hypoxia.   
  
She is known to us from previous prolonged hospitalization with complications from COVID PNA and ARDS, had a tracheostomy and spent time in [facility] before trasfer to NH. Her condition has worsened in house with hypercapnic respiratory failure and she is currently DNR and on NIPPV. She is very physically and mentally debilitated from her covid ordeal. 
  
Diffuse pulmonary infiltrates were noted and she seems not to have improved with diuresis. 
  
 
Subjective:  
 
Patient alert and sitting up, eating lunch. She is currently on 2L NC. Denies any shortness of breath or pain. She says that she had a cough this morning but none since. Current Facility-Administered Medications Medication Dose Route Frequency  doxycycline (VIBRAMYCIN) capsule 100 mg  100 mg Oral Q12H  
 rOPINIRole (REQUIP) tablet 0.5 mg  0.5 mg Oral QHS PRN  
 rosuvastatin (CRESTOR) tablet 20 mg  20 mg Oral QHS  gabapentin (NEURONTIN) capsule 100 mg  100 mg Oral BID  [Held by provider] insulin glargine (LANTUS) injection 15 Units  15 Units SubCUTAneous BID  clopidogreL (PLAVIX) tablet 75 mg  75 mg Oral DAILY  [Held by provider] insulin lispro (HUMALOG) injection 7 Units  7 Units SubCUTAneous TIDAC  metoprolol tartrate (LOPRESSOR) tablet 12.5 mg  12.5 mg Oral Q12H  aspirin delayed-release tablet 81 mg  81 mg Oral DAILY  sodium chloride (NS) flush 5-40 mL  5-40 mL IntraVENous Q8H  
 sodium chloride (NS) flush 5-40 mL  5-40 mL IntraVENous PRN  
 acetaminophen (TYLENOL) tablet 650 mg  650 mg Oral Q6H PRN Or  
 acetaminophen (TYLENOL) suppository 650 mg  650 mg Rectal Q6H PRN  polyethylene glycol (MIRALAX) packet 17 g  17 g Oral DAILY  senna-docusate (PERICOLACE) 8.6-50 mg per tablet 1 Tab  1 Tab Oral BID  magnesium hydroxide (MILK OF MAGNESIA) 400 mg/5 mL oral suspension 30 mL  30 mL Oral DAILY PRN  
 bisacodyL (DULCOLAX) suppository 10 mg  10 mg Rectal DAILY PRN  
 ondansetron (ZOFRAN ODT) tablet 4 mg  4 mg Oral Q8H PRN Or  
 ondansetron (ZOFRAN) injection 4 mg  4 mg IntraVENous Q6H PRN  
 heparin (porcine) injection 5,000 Units  5,000 Units SubCUTAneous Q8H  
 furosemide (LASIX) injection 40 mg  40 mg IntraVENous BID  cefTRIAXone (ROCEPHIN) 2 g in 0.9% sodium chloride (MBP/ADV) 50 mL MBP  2 g IntraVENous Q24H  
 dextrose 40% (GLUTOSE) oral gel 1 Tube  15 g Oral PRN  
 glucagon (GLUCAGEN) injection 1 mg  1 mg IntraMUSCular PRN  
 dextrose (D50W) injection syrg 12.5-25 g  25-50 mL IntraVENous PRN  
 insulin lispro (HUMALOG) injection   SubCUTAneous AC&HS  famotidine (PEPCID) tablet 20 mg  20 mg Oral QPM  
 alcohol 62% (NOZIN) nasal  1 Ampule  1 Ampule Topical Q12H Review of Systems 
+cough Constitutional: negative for fever, chills, sweats Cardiovascular: negative for chest pain, palpitations, syncope, edema Gastrointestinal:  negative for dysphagia, reflux, vomiting, diarrhea, abdominal pain, or melena Neurologic:  negative for focal weakness, numbness, headache Objective:  
 
Vitals:  
 04/20/21 0401 04/20/21 0748 04/20/21 1130 04/20/21 1142 BP: (!) 150/84 135/68 (!) 162/76 Pulse: 65 84 61 Resp: 18 20 20 Temp: 98.1 °F (36.7 °C) 98.3 °F (36.8 °C) 98 °F (36.7 °C) SpO2: 96% 95% 100% 100% Weight: 144 lb 1.6 oz (65.4 kg) Height:      
 
 
 
Intake/Output Summary (Last 24 hours) at 4/20/2021 1300 Last data filed at 4/20/2021 1220 Gross per 24 hour Intake 120 ml Output 2125 ml Net -2005 ml Physical Exam:  
Constitution:  the patient is well developed and in no acute distress EENMT:  Sclera clear, pupils equal, oral mucosa moist 
Respiratory:  Fairly clear, no wheeze. On 2L NC Cardiovascular:  RRR without M,G,R 
Gastrointestinal: soft and non-tender; with positive bowel sounds. Musculoskeletal: warm without cyanosis. There is no lower extremity edema. Skin:  no jaundice or rashes, no wounds Neurologic: no gross neuro deficits Psychiatric:  alert and oriented x 4 CXR:  4/18/2021 4/16/2021 LAB Recent Labs  
  04/20/21 
1117 04/20/21 
0559 04/19/21 
2150 04/19/21 
1623 04/19/21 
1136 GLUCPOC 140* 91 214* 102* 107* Recent Labs  
  04/20/21 
0650 04/19/21 
0654 04/18/21 
1111 WBC 7.1 3.1* 5.4 HGB 8.3* 7.9* 7.7* HCT 26.1* 25.4* 25.4* PLT 87* 80* 75* Recent Labs  
  04/20/21 
0650 04/19/21 
0654 04/18/21 
1111  145 145  
K 4.4 4.2 4.6 * 114* 115* CO2 29 29 28 GLU 86 74 69 BUN 46* 48* 57* CREA 1.46* 1.41* 1.72* CA 9.2 9.3 8.6 ALB 3.1* 3.2 3.4 TBILI 0.4 0.4 0.4 ALT 24 27 31 Recent Labs 04/19/21 
0618 04/18/21 
1602 04/18/21 
1027 PHI 7.34* 7.31* 7.29* PCO2I 53.9* 51.9* 50.0*  
PO2I 94 55* 84 HCO3I 29.3* 26.2* 23.8 Recent Labs 04/18/21 
1111 LAC 0.5 Assessment:  (Medical Decision Making) Hospital Problems  Date Reviewed: 4/19/2021 Codes Class Noted POA * (Principal) [facility] ICD-10-CM: I50.9 ICD-9-CM: 428.0  4/16/2021 Unknown  Acute hypoxemic respiratory failure [facility]) ICD-10-CM: J96.01 
ICD-9-CM: 518.81  12/24/2020 Yes Acute on chronic renal failure (HCC) ICD-10-CM: N17.9, N18.9 ICD-9-CM: 584.9, 585.9  9/17/2019 Yes Anemia ICD-10-CM: D64.9 ICD-9-CM: 285.9  8/8/2019 Yes Coronary artery disease involving coronary bypass graft of native heart without angina pectoris (Chronic) ICD-10-CM: E55.623 ICD-9-CM: 414.05  6/15/2015 Yes Uncontrolled type 2 diabetes mellitus with hyperglycemia (HCC) (Chronic) ICD-10-CM: E11.65 ICD-9-CM: 250.02  6/15/2015 Yes Essential hypertension (Chronic) ICD-10-CM: I10 
ICD-9-CM: 401.9  6/15/2015 Yes HLD (hyperlipidemia) (Chronic) ICD-10-CM: Q33.2 ICD-9-CM: 272.4  6/15/2015 Yes Previously hospitalized for 3 month with COVID pneumonia complicated by ARDS, intubations, PRATIMA required dialysis. Was d/c to rehab facility and then NH. Found to be altered at rehab. Recently treated for UTI. Admitted with hypoxia and AMS. Briefly required BiPAP. Plan:  (Medical Decision Making) -- Continue weaning O2, currently on 2L NC. 
-- Continue lasix, monitor Cr 
-- On rocephin and doxy; blood cultures NGTD 
-- BIPAP HS prn Patient currently DNR. Respiratory status is stable on 2L NC. No further pulmonary needs noted at this time and we have nothing further to add. We will sign off but please call if we can be of further assistance. More than 50% of the time documented was spent in face-to-face contact with the patient and in the care of the patient on the floor/unit where the patient is located. Anibal Morgan NP Lungs:  Some basilar crackles Heart:  RRR with no Murmur/Rubs/Gallops Additional Comments: Will sign off -nothing to add at this point I have spoken with and examined the patient. I agree with the above assessment and plan as documented.  
 
Jacoby Baptiste MD

## 2021-04-20 NOTE — DISCHARGE SUMMARY
Hospitalist Discharge Summary Admit Date:  2021 11:15 AM  
DC note date: 2021 Name:  Ruddy Pool Age:  78 y.o. 
:  1940 MRN:  [id number] PCP:  Torrey Lee MD 
Treatment Team: Attending Provider: Marj Boyer MD; Consulting Provider: Carin Lester MD; Utilization Review: Clinton Fischer; Primary Nurse: Mindy Singh RN; Physical Therapy Assistant: Betahnie Robles PTA; Care Manager: She Coleman RN 
 
Problem List for this Hospitalization: 
Hospital Problems as of 2021 Date Reviewed: 2021 Codes Class Noted - Resolved POA * (Principal) CHF exacerbation ([facility].) ICD-10-CM: I50.9 ICD-9-CM: 428.0  2021 - Present Unknown Acute hypoxemic respiratory failure ([facility].) ICD-10-CM: J96.01 
ICD-9-CM: 518.81  2020 - Present Yes Acute on chronic renal failure (HCC) ICD-10-CM: N17.9, N18.9 ICD-9-CM: 584.9, 585.9  2019 - Present Yes Anemia ICD-10-CM: D64.9 ICD-9-CM: 285.9  2019 - Present Yes Coronary artery disease involving coronary bypass graft of native heart without angina pectoris (Chronic) ICD-10-CM: J12.482 ICD-9-CM: 414.05  6/15/2015 - Present Yes Uncontrolled type 2 diabetes mellitus with hyperglycemia (HCC) (Chronic) ICD-10-CM: E11.65 ICD-9-CM: 250.02  6/15/2015 - Present Yes Essential hypertension (Chronic) ICD-10-CM: I10 
ICD-9-CM: 401.9  6/15/2015 - Present Yes HLD (hyperlipidemia) (Chronic) ICD-10-CM: Y60.2 ICD-9-CM: 272.4  6/15/2015 - Present Yes Admission HPI from 2021:   
\" Patient is a 78 y.o. female with medical h/o 3-month hospitalization for [id number]-[id number] pneumonia complicated with ARDS, intubations, acute kidney injury on chronic requiring dialysis, baseline diabetes CKD CAD status post bypass graft Patient was discharged to long-term acute care in March, and later discharged to facility for rehab, patient was found by daughter to be more altered, onset 1 week ago, slowly progressive, unable to formulate speech clearly at times, episodic, also with \"shaking episodes\" daughter was told that patient was treated for congestive heart failure exacerbation with oral Lasix, as well as antibiotics for urinary tract infection over the last week, patient continued to deteriorate, finally sent to emergency room today for altered mental status of aphasia and hypoxia \" Hospital Course: 
Patient is admitted due to altered mental status and acute on chronic respiratory failure with hypoxia and hypercapnia. ABGs with decompensation respiratory acidosis, initially requiring BiPAP. CT chest with bilateral airspace and interstitial opacities, concerning for pulmonary edema versus atypical pneumonia. Procalcitonin negative. Patient was started on empiric ceftriaxone/doxycycline. Also started on IV Lasix. Repeat echocardiogram on 4/17 with preserved ejection fraction and diastolic dysfunction. Patient condition worsened on 4/18 with hypercapnic respiratory failure and was started on BiPAP, with improvement in symptoms later and was transitioned to nasal cannula. Pulmonology consulted and followed patient. Recommend BiPAP at night as needed. Patient is DNR. Patient was with poor oral intake but family did not want tube feeding. Patient condition improved to her recent baseline at nursing home. Patient is very physically and mentally debilitated since Covid infection. Patient's blood sugars on the lower side during the stay. We held schedule Lantus and lispro and continued sliding scale. All other chronic conditions stable and we continued home meds. On the day of discharge, patient was alert and oriented. Has started eating. On 2 L nasal cannula. Patient is stable to discharge to the SNF today. Disposition: [facility] Activity: Activity as tolerated Diet: DIET DIABETIC CONSISTENT CARB Regular; 2 GM NA (House Low NA); FR 2000ML Code Status: DNR 
 
Follow Up Orders: Follow-up Appointments Procedures  FOLLOW UP VISIT Appointment in: 3 - 5 Days PCP  
  PCP Standing Status:   Standing Number of Occurrences:   1 Order Specific Question:   Appointment in Answer:   3 - 5 Days Follow-up Information Follow up With Specialties Details Why Contact Info [facility]   Dr. Maria Luz Terrell [address] [facility] [place] [address] 
[phone] [address] [place] [facility] [address]   [address] [facility] Yif[address] Grub[address] [address] 
[phone] Rita Bledsoe MD Internal Medicine   [address] [facility] [address] [place] [address] 
[phone] Discharge meds at bottom of this note. Plan was discussed with patient and daughter. All questions answered. Patient was stable at time of discharge. Given instructions to call a physician or return if any concerns. Discharge summary and encounter summary was sent to PCP electronically via \"Comm Mgt\" link in [facility], if possible. Diagnostic Imaging/Tests:  
Xr Chest Sngl V Result Date: 4/18/2021 Portable AP semiupright view  Date:  4/18/2021  at 1002 hours comparison chest x-ray : 4/16/2021  Clinical Information: Shortness of breath Findings: Metallic sternal wires are present. Heart is unchanged in size, enlarged. Pulmonary vascularity is prominent congested. Lungs are poorly expanded with patchy infiltrates in both lungs. Mild increase in the pulmonary infiltrates. Small bilateral pleural effusion. Increasing pulmonary infiltrates Ct Head Wo Cont Result Date: 4/16/2021 EXAMINATION: CT HEAD WO CONT 4/16/2021 4:33 PM INDICATION: Achalasia and tremors COMPARISON: CT head January 22, 2021 TECHNIQUE: Multiple-row detector helical CT examination of the head without intravenous contrast. Radiation dose reduction techniques were used for this study.  Our CT scanners use one or all of the following: Automated exposure control, adjustment of the mA and/or kV according to patient size, iterative reconstruction. FINDINGS: No acute intracranial hemorrhage. 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 Normal size of ventricles and extra-axial spaces for age. No space-occupying lesion. Bilateral mastoid fluid. Prior bilateral lens replacements. No abnormality of extracranial soft tissues. 1. No acute abnormality. 2. 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 Ct Chest Wo Cont Result Date: 4/16/2021 CT of the chest without contrast. CLINICAL INDICATION: Respiratory failure; Covid 19 positive PROCEDURE: Serial thin section axial images obtained from the thoracic inlet through the upper abdomen without the administration of intravenous contrast. Radiation dose reduction techniques were used for this study. Our CT scanners use one or all of the following: Automated exposure control, adjusted of the mA and/or kV according to patient size, iterative reconstruction COMPARISON: No prior FINDINGS: Bilateral thyroid nodules are appreciated. The heart is enlarged. There is trace, bilateral left greater than right pleural effusions. There is no pneumothorax. Patchy heterogeneous, dense/groundglass opacities noted in the bilateral upper lobes extending peripherally from the olivia. More irregular reticular nodular interstitial groundglass densities noted in the bilateral lower lobes. The pattern is nonspecific. Atypical pneumonia or pulmonary edema could appear similar. Limited evaluation of the upper abdomen is unremarkable. No aggressive osseous is identified. 1. Bilateral airspace and interstitial opacities involving all lobes with more confluent opacity noted in the bilateral upper lobes. Atypical pneumonia or pulmonary edema could appear similar. There is cardiomegaly. 2. Trace left greater than right bilateral pleural effusions. 3. Bilateral thyroid nodules.  These could be more completely evaluated with thyroid ultrasound Xr Chest [place] Result Date: 2021 Portable chest x-ray CLINICAL INDICATION: Shortness of breath and cough FINDINGS: Single AP view of the chest compared to a similar exam dated 3/8/2021 shows worsening of bilateral diffuse reticular nodular interstitial densities throughout both lungs. There is no definite pleural effusion. Post CABG changes are noted. There is no pneumothorax. The bones are osteopenic. Significantly worse bilateral pulmonary edema or atypical pneumonia pattern. Echocardiogram results: 
Results for orders placed or performed during the hospital encounter of 21  
2D ECHO COMPLETE ADULT (TTE) W OR WO CONTR Narrative [facility] [address] [place], [address] [facility] 
[phone] Transthoracic Echocardiogram 
2D, M-mode, Doppler, and Color Doppler Patient: Aurora Figueroa 
MR #: [id number] : [id number] Age: 78 years Gender: Female Study date: 2021 Account #: [de-identified] Height: 65 in 
Weight: 139.7 lb 
BSA: 1.7 mï¾² Status:Routine Location: 806 BP: 126/ 71 Allergies: SULFA (SULFONAMIDE ANTIBIOTICS) Sonographer:  AALIYAH Butler Group:  [facility] Referring Physician:  Kendell Marinelli MD 
Reading Physician:  Florence Hernandez MD 
 
INDICATIONS: Cardiomegaly PROCEDURE: This was a routine study. A transthoracic echocardiogram was 
performed. The study included complete 2D imaging, M-mode, complete spectral 
Doppler, and color Doppler. Image quality was adequate. LEFT VENTRICLE: Size was normal. Systolic function was normal. Ejection 
fraction was estimated in the range of 55 % to 60 %. There were no regional 
wall motion abnormalities. Wall thickness was mildly increased. Doppler 
parameters were consistent with diastolic dysfunction. Avg E/e': 29.9.  
 
RIGHT VENTRICLE: The size was normal. Systolic function was normal. Estimated 
peak pressure was in the range of 50-55 mmHg. LEFT ATRIUM: The atrium was moderately dilated. RIGHT ATRIUM: The atrium was moderately dilated. SYSTEMIC VEINS: IVC: The inferior vena cava was dilated. AORTIC VALVE: The valve was trileaflet. Leaflets exhibited mild sclerosis. There was no evidence for stenosis. There was trivial regurgitation. MITRAL VALVE: There was moderate annular calcification. There was no evidence 
for stenosis. There was mild to moderate regurgitation. TRICUSPID VALVE: The valve structure was normal. There was no evidence for 
stenosis. There was moderate regurgitation. PULMONIC VALVE: The valve structure was normal. There was no evidence for 
stenosis. There was mild regurgitation. PERICARDIUM: There was no pericardial effusion. AORTA: The root exhibited normal size. SUMMARY: 
 
-  Left ventricle: Systolic function was normal. Ejection fraction was 
estimated in the range of 55 % to 60 %. There were no regional wall motion 
abnormalities. Wall thickness was mildly increased. -  Right ventricle: Estimated peak pressure was in the range of 50-55 mmHg. -  Left atrium: The atrium was moderately dilated. -  Right atrium: The atrium was moderately dilated. -  Inferior vena cava, hepatic veins: The inferior vena cava was dilated. -  Mitral valve: There was moderate annular calcification. There was mild to 
moderate regurgitation. 
 
-  Tricuspid valve: There was moderate regurgitation. SYSTEM MEASUREMENT TABLES 
 
2D mode AoR Diam (2D): 2.7 cm 
LA Dimension (2D): 4.3 cm Left Atrium Systolic Volume Index; Method of Disks, Biplane; 2D mode;: 50.1 
ml/m2 IVS/LVPW (2D): 1.4 IVSd (2D): 1.5 cm LVIDd (2D): 4.6 cm 
LVIDs (2D): 3.1 cm 
LVOT Area (2D): 3.1 cm2 LVPWd (2D): 1.1 cm RVIDd (2D): 3.1 cm Unspecified Scan Mode Peak Grad; Mean; Antegrade Flow: 5 mm[Hg] Vmax; Antegrade Flow: 116 cm/s LVOT Diam: 2 cm Prepared and signed by 
 
Keron Munoz MD 
Signed 17-Apr-2021 18:16:40 Procedures done this admission: * No surgery found * All Micro Results Procedure Component Value Units Date/Time COVID-19 RAPID TEST [[id number]] Collected: 04/20/21 1111 Order Status: Completed Updated: 04/20/21 1125 CULTURE, BLOOD [[id number]] Collected: 04/16/21 1212 Order Status: Completed Specimen: Blood Updated: 04/20/21 1026 Special Requests: --     
  RIGHT Antecubital 
  
  Culture result: NO GROWTH 4 DAYS     
 CULTURE, BLOOD [[id number]] Collected: 04/16/21 1215 Order Status: Completed Specimen: Blood Updated: 04/20/21 1026 Special Requests: --     
  RIGHT 
ARM Culture result: NO GROWTH 4 DAYS     
 CULTURE, URINE [[id number]] Collected: 04/16/21 1300 Order Status: Completed Specimen: Urine from Clean catch Updated: 04/19/21 6344 Special Requests: NO SPECIAL REQUESTS Culture result: NO GROWTH 2 DAYS S. Damaris Grand, UR/CSF [[id number]] Collected: 04/17/21 1053 Order Status: Completed Specimen: Miscellaneous sample Updated: 04/18/21 4588 Source URINE Specimen Urine Streptococcus pneumoniae Ag Negative Fluid culture Not indicated. Organism ID Not indicated. Please note Comment Comment: (NOTE) College of American Pathologists standards require a culture to be 
performed on CSF specimens submitted for bacterial antigen testing. (CAP [id number]) Urine specimens will not be cultured. Performed At: [address] [id number] Beverley Higgins MD [id number] COVID-19 RAPID TEST [[id number]] Collected: 04/16/21 1403 Order Status: Completed Specimen: Nasopharyngeal Updated: 04/16/21 1434 Specimen source NASAL     
  COVID-19 rapid test Not detected Comment:     
The specimen is NEGATIVE for SARS-CoV-2, the novel coronavirus associated with COVID-19. A negative result does not rule out COVID-19.  
    
This test has been authorized by the FDA under an Emergency Use Authorization (EUA) for use by authorized laboratories. Fact sheet for Healthcare Providers: ConventionUpdate.co.nz Fact sheet for Patients: ConventionUpdate.co.nz Methodology: Isothermal Nucleic Acid Amplification SARS-CoV-2 Lab Results \"Novel Coronavirus\" Test: No results found for: COV2NT \"Emergent Disease\" Test: No results found for: EDPR \"SARS-COV-2\" Test: No results found for: XGCOVT Rapid Test:  
Lab Results Component Value Date/Time COVR Not detected 04/16/2021 02:03 PM  
 
  
 
 
Labs: Results:  
   
BMP, Mg, Phos Recent Labs  
  04/20/21 
0650 04/19/21 
0654 04/18/21 
1111  145 145  
K 4.4 4.2 4.6 * 114* 115* CO2 29 29 28 AGAP 4* 2* 2* BUN 46* 48* 57* CREA 1.46* 1.41* 1.72* CA 9.2 9.3 8.6 GLU 86 74 69  
  
CBC Recent Labs  
  04/20/21 
0650 04/19/21 
0654 04/18/21 
1111 WBC 7.1 3.1* 5.4  
RBC 2.61* 2.45* 2.40* HGB 8.3* 7.9* 7.7* HCT 26.1* 25.4* 25.4* PLT 87* 80* 75* GRANS 66 60 72 LYMPH 22 24 16 EOS 2 4 2 MONOS 10 10 8 BASOS 0 1 1 IG 0 1 1 ANEU 4.7 1.9 3.9 ABL 1.5 0.8 0.9 GURDEEP 0.1 0.1 0.1 ABM 0.7 0.3 0.4 ABB 0.0 0.0 0.0 AIG 0.0 0.0 0.1 LFT Recent Labs  
  04/20/21 
0650 04/19/21 
0654 04/18/21 
1111 ALT 24 27 31  107 108 TP 7.0 6.9 7.1 ALB 3.1* 3.2 3.4 GLOB 3.9* 3.7* 3.7* AGRAT 0.8* 0.9* 0.9* Cardiac Testing Lab Results Component Value Date/Time BNP 22 (H) 01/30/2019 10:32 AM  
 Troponin-I, Qt. <0.02 (L) 03/08/2020 01:58 PM  
  
Coagulation Tests Lab Results Component Value Date/Time  Prothrombin time 12.4 (L) 03/11/2021 06:12 AM  
 Prothrombin time 15.4 (H) 08/10/2019 03:41 AM  
 Prothrombin time 14.9 (H) 08/08/2019 06:17 PM  
 INR 0.9 03/11/2021 06:12 AM  
 INR 1.2 08/10/2019 03:41 AM  
 INR 1.2 08/08/2019 06:17 PM  
 aPTT 25.4 03/11/2021 06:12 AM  
 aPTT 53.9 (H) 01/05/2021 12:04 PM  
 A1c Lab Results Component Value Date/Time Hemoglobin A1c 6.0 04/05/2021 09:35 AM  
 Hemoglobin A1c 6.4 (H) 03/12/2021 07:38 AM  
 Hemoglobin A1c 8.4 (H) 12/26/2020 03:45 AM  
  
Lipid Panel Lab Results Component Value Date/Time Cholesterol, total 221 (H) 03/09/2015 02:42 PM  
 HDL Cholesterol 64 03/09/2015 02:42 PM  
 LDL, calculated 114 03/09/2015 02:42 PM  
 VLDL, calculated 43 (H) 03/09/2015 02:42 PM  
 Triglyceride 217 (H) 03/09/2015 02:42 PM  
 CHOL/HDL Ratio 3.5 03/09/2015 02:42 PM  
  
Thyroid Panel Lab Results Component Value Date/Time TSH 1.260 01/30/2019 10:32 AM  
    
Most Recent UA Lab Results Component Value Date/Time Color YELLOW 03/20/2021 04:00 PM  
 Appearance CLEAR 03/20/2021 04:00 PM  
 Specific gravity 1.013 03/20/2021 04:00 PM  
 pH (UA) 7.5 03/20/2021 04:00 PM  
 Protein 30 (A) 03/20/2021 04:00 PM  
 Glucose Negative 03/20/2021 04:00 PM  
 Ketone Negative 03/20/2021 04:00 PM  
 Bilirubin Negative 03/20/2021 04:00 PM  
 Blood MODERATE (A) 03/20/2021 04:00 PM  
 Urobilinogen 0.2 03/20/2021 04:00 PM  
 Nitrites Negative 03/20/2021 04:00 PM  
 Leukocyte Esterase TRACE (A) 03/20/2021 04:00 PM  
 WBC 10-20 04/16/2021 01:00 PM  
 RBC 20-50 04/16/2021 01:00 PM  
 Epithelial cells 0-3 04/16/2021 01:00 PM  
 Bacteria 0 04/16/2021 01:00 PM  
 Casts 3-5 04/16/2021 01:00 PM  
 Crystals, urine 0 03/08/2020 02:14 PM  
 Mucus 0 03/08/2020 02:14 PM  
 Other observations RESULTS VERIFIED MANUALLY 12/26/2020 11:53 AM  
  
 
Allergies Allergen Reactions  Sulfa (Sulfonamide Antibiotics) Swelling Immunization History Administered Date(s) Administered  Influenza Vaccine 08/18/2014  Influenza Vaccine PF 10/18/2010, 11/09/2011, 10/03/2012  TB Skin Test (PPD) Intradermal 01/30/2019, 08/09/2019, 03/09/2020, 12/28/2020  Zoster Recombinant 07/10/2019 All Labs from Last 24 Hrs: 
Recent Results (from the past 24 hour(s)) GLUCOSE, POC  Collection Time: 04/19/21  4:23 PM Result Value Ref Range Glucose (POC) 102 (H) 65 - 100 mg/dL Performed by [employer] GLUCOSE, POC Collection Time: 04/19/21  9:50 PM  
Result Value Ref Range Glucose (POC) 214 (H) 65 - 100 mg/dL Performed by [employer]   
GLUCOSE, POC Collection Time: 04/20/21  5:59 AM  
Result Value Ref Range Glucose (POC) 91 65 - 100 mg/dL Performed by [employer]   
CBC WITH AUTOMATED DIFF Collection Time: 04/20/21  6:50 AM  
Result Value Ref Range WBC 7.1 4.3 - 11.1 K/uL  
 RBC 2.61 (L) 4.05 - 5.2 M/uL HGB 8.3 (L) 11.7 - 15.4 g/dL HCT 26.1 (L) 35.8 - 46.3 % .0 (H) 79.6 - 97.8 FL  
 MCH 31.8 26.1 - 32.9 PG  
 MCHC 31.8 31.4 - 35.0 g/dL  
 RDW 18.6 (H) 11.9 - 14.6 % PLATELET 87 (L) 387 - 450 K/uL MPV 10.7 9.4 - 12.3 FL ABSOLUTE NRBC 0.05 0.0 - 0.2 K/uL  
 DF AUTOMATED NEUTROPHILS 66 43 - 78 % LYMPHOCYTES 22 13 - 44 % MONOCYTES 10 4.0 - 12.0 % EOSINOPHILS 2 0.5 - 7.8 % BASOPHILS 0 0.0 - 2.0 % IMMATURE GRANULOCYTES 0 0.0 - 5.0 %  
 ABS. NEUTROPHILS 4.7 1.7 - 8.2 K/UL  
 ABS. LYMPHOCYTES 1.5 0.5 - 4.6 K/UL  
 ABS. MONOCYTES 0.7 0.1 - 1.3 K/UL  
 ABS. EOSINOPHILS 0.1 0.0 - 0.8 K/UL  
 ABS. BASOPHILS 0.0 0.0 - 0.2 K/UL  
 ABS. IMM. GRANS. 0.0 0.0 - 0.5 K/UL METABOLIC PANEL, COMPREHENSIVE Collection Time: 04/20/21  6:50 AM  
Result Value Ref Range Sodium 145 136 - 145 mmol/L Potassium 4.4 3.5 - 5.1 mmol/L Chloride 112 (H) 98 - 107 mmol/L  
 CO2 29 21 - 32 mmol/L Anion gap 4 (L) 7 - 16 mmol/L Glucose 86 65 - 100 mg/dL BUN 46 (H) 8 - 23 MG/DL Creatinine 1.46 (H) 0.6 - 1.0 MG/DL  
 GFR est AA 44 (L) >60 ml/min/1.73m2 GFR est non-AA 36 (L) >60 ml/min/1.73m2 Calcium 9.2 8.3 - 10.4 MG/DL Bilirubin, total 0.4 0.2 - 1.1 MG/DL  
 ALT (SGPT) 24 12 - 65 U/L  
 AST (SGOT) 17 15 - 37 U/L Alk. phosphatase 108 50 - 136 U/L Protein, total 7.0 6.3 - 8.2 g/dL Albumin 3.1 (L) 3.2 - 4.6 g/dL  Globulin 3.9 (H) 2.3 - 3.5 g/dL  
 A-G Ratio 0.8 (L) 1.2 - 3.5 GLUCOSE, POC Collection Time: 04/20/21 11:17 AM  
Result Value Ref Range Glucose (POC) 140 (H) 65 - 100 mg/dL Performed by [employer] Discharge Exam: 
Patient Vitals for the past 24 hrs: 
 Temp Pulse Resp BP SpO2  
04/20/21 1130 98 °F (36.7 °C) 61 20 (!) 162/76 100 % 04/20/21 0748 98.3 °F (36.8 °C) 84 20 135/68 95 % 04/20/21 0401 98.1 °F (36.7 °C) 65 18 (!) 150/84 96 % 04/19/21 2237 98 °F (36.7 °C) 71 18 (!) 151/74 99 % 04/19/21 2000 97.8 °F (36.6 °C) 77 20 (!) 148/77 100 % 04/19/21 1543 97.7 °F (36.5 °C) 68 20 (!) 161/76 99 % 04/19/21 1154 97.4 °F (36.3 °C) 70 20 (!) 155/81 100 % Oxygen Therapy O2 Sat (%): 100 % (04/20/21 1130) Pulse via Oximetry: 78 beats per minute (04/19/21 0732) O2 Device: Nasal cannula (04/20/21 0401) Skin Assessment: Clean, dry, & intact (04/17/21 1948) Skin Protection for O2 Device: No (04/17/21 1948) O2 Flow Rate (L/min): 4 l/min (04/20/21 0401) FIO2 (%): 30 % (04/19/21 0136) Estimated body mass index is 23.98 kg/m² as calculated from the following: 
  Height as of this encounter: 5' 5\" (1.651 m). Weight as of this encounter: 65.4 kg (144 lb 1.6 oz). Intake/Output Summary (Last 24 hours) at 4/20/2021 1137 Last data filed at 4/20/2021 1387 Gross per 24 hour Intake 120 ml Output 1525 ml Net -1405 ml *Note that automatically entered I/Os may not be accurate; dependent on patient compliance with collection and accurate  by assistants. Gen:  the patient is in no acute distress, ill looking, on 2 L NC 
HEENT:  Sclera clear, pupils equal, oral mucosa moist 
Lungs: Coarse bilaterally Heart:  RRR without M,G,R 
Abd: soft and non-tender; with positive bowel sounds. Ext: warm without cyanosis. There is 1+ lower leg edema. Skin:  no jaundice or rashes, no wounds Neuro: no gross neuro deficits   Moves all 4 extremities. Psychiatric:  alert Current Med List in Hospital: Current Facility-Administered Medications Medication Dose Route Frequency  doxycycline (VIBRAMYCIN) capsule 100 mg  100 mg Oral Q12H  
 rOPINIRole (REQUIP) tablet 0.5 mg  0.5 mg Oral QHS PRN  
 rosuvastatin (CRESTOR) tablet 20 mg  20 mg Oral QHS  [Held by provider] gabapentin (NEURONTIN) capsule 100 mg  100 mg Oral BID  [Held by provider] insulin glargine (LANTUS) injection 15 Units  15 Units SubCUTAneous BID  clopidogreL (PLAVIX) tablet 75 mg  75 mg Oral DAILY  [Held by provider] insulin lispro (HUMALOG) injection 7 Units  7 Units SubCUTAneous TIDAC  metoprolol tartrate (LOPRESSOR) tablet 12.5 mg  12.5 mg Oral Q12H  aspirin delayed-release tablet 81 mg  81 mg Oral DAILY  sodium chloride (NS) flush 5-40 mL  5-40 mL IntraVENous Q8H  
 sodium chloride (NS) flush 5-40 mL  5-40 mL IntraVENous PRN  
 acetaminophen (TYLENOL) tablet 650 mg  650 mg Oral Q6H PRN Or  
 acetaminophen (TYLENOL) suppository 650 mg  650 mg Rectal Q6H PRN  polyethylene glycol (MIRALAX) packet 17 g  17 g Oral DAILY  senna-docusate (PERICOLACE) 8.6-50 mg per tablet 1 Tab  1 Tab Oral BID  magnesium hydroxide (MILK OF MAGNESIA) 400 mg/5 mL oral suspension 30 mL  30 mL Oral DAILY PRN  
 bisacodyL (DULCOLAX) suppository 10 mg  10 mg Rectal DAILY PRN  
 ondansetron (ZOFRAN ODT) tablet 4 mg  4 mg Oral Q8H PRN Or  
 ondansetron (ZOFRAN) injection 4 mg  4 mg IntraVENous Q6H PRN  
 heparin (porcine) injection 5,000 Units  5,000 Units SubCUTAneous Q8H  
 furosemide (LASIX) injection 40 mg  40 mg IntraVENous BID  cefTRIAXone (ROCEPHIN) 2 g in 0.9% sodium chloride (MBP/ADV) 50 mL MBP  2 g IntraVENous Q24H  
 dextrose 40% (GLUTOSE) oral gel 1 Tube  15 g Oral PRN  
 glucagon (GLUCAGEN) injection 1 mg  1 mg IntraMUSCular PRN  
 dextrose (D50W) injection syrg 12.5-25 g  25-50 mL IntraVENous PRN  
 insulin lispro (HUMALOG) injection   SubCUTAneous AC&HS  famotidine (PEPCID) tablet 20 mg  20 mg Oral QPM  alcohol 62% (NOZIN) nasal  1 Ampule  1 Ampule Topical Q12H Discharge Info:  
Current Discharge Medication List  
  
START taking these medications Details  
cefpodoxime (VANTIN) 200 mg tablet Take 1 Tab by mouth two (2) times a day for 4 days. Qty: 8 Tab, Refills: 0  
  
azithromycin (ZITHROMAX) 500 mg tab Take 1 Tab by mouth daily for 4 days. Qty: 4 Tab, Refills: 0  
  
furosemide (LASIX) 40 mg tablet Take 1 Tab by mouth daily for 30 days. Qty: 30 Tab, Refills: 0 CONTINUE these medications which have CHANGED Details  
insulin lispro (HUMALOG) 100 unit/mL injection Please give as per sliding scale Qty: 1 Vial, Refills: 0 CONTINUE these medications which have NOT CHANGED Details  
atorvastatin (LIPITOR) 40 mg tablet Take 40 mg by mouth. aspirin delayed-release 81 mg tablet Take  by mouth daily. metoprolol tartrate (LOPRESSOR) 25 mg tablet Take 0.5 Tabs by mouth every twelve (12) hours. Qty: 60 Tab, Refills: 1  
  
clopidogrel (PLAVIX) 75 mg tab Take 75 mg by mouth.  
  
losartan (COZAAR) 100 mg tablet Take  by mouth daily. Associated Diagnoses: CAD (coronary artery disease); HTN (hypertension)  
  
rosuvastatin (CRESTOR) 20 mg tablet Take 1 Tab by mouth nightly. Qty: 90 Tab, Refills: 1 Associated Diagnoses: CAD (coronary artery disease); HLD (hyperlipidemia)  
  
gabapentin (NEURONTIN) 100 mg capsule Take 1 Cap by mouth three (3) times daily. Qty: 30 Cap, Refills: 5 Associated Diagnoses: Peripheral neuropathy  
  
rOPINIRole (REQUIP) 0.5 mg tablet Take 1 Tab by mouth nightly as needed. Qty: 90 Tab, Refills: 1 Associated Diagnoses: RLS (restless legs syndrome) STOP taking these medications  
  
 insulin glargine (LANTUS) 100 unit/mL injection Comments:  
Reason for Stopping:   
   
  
 
 
 
Time spent in patient discharge planning and coordination 41 minutes.  
 
Signed: 
Haris Tapia MD

## 2021-04-20 NOTE — PROGRESS NOTES
MSN, cM:  Patient to be discharged back to [facility] today to finish her rehab services. Patient and daughter Oumar Wayne [facility]) agree with this discharge plan. Patient has met all milestones for this admission. This CM has spoke with Oumar Wayne [facility]) and informed her of transfer today. [facility] to transport patient to facility. Care Management Interventions PCP Verified by CM: Yes(Dr. Regina Tee) Mode of Transport at Discharge: BLS([facility]) Transition of Care Consult (CM Consult): SNF Partner SNF: No 
Reason Why Partner SNF Not Chosen: Positive previous encounter Physical Therapy Consult: Yes Occupational Therapy Consult: Yes Current Support Network: [address] [place] [address] Confirm Follow Up Transport: (facility MD) The Plan for Transition of Care is Related to the Following Treatment Goals : Short term rehab to regain strength back to baseline The Patient and/or Patient Representative was Provided with a Choice of Provider and Agrees with the Discharge Plan?: Yes Name of the Patient Representative Who was Provided with a Choice of Provider and Agrees with the Discharge Plan: Ms. Areli Bauer (patient) and daughter Oumar Wayne Freedom of Choice List was Provided with Basic Dialogue that Supports the Patient's Individualized Plan of Care/Goals, Treatment Preferences and Shares the Quality Data Associated with the Providers?: Yes Discharge Location Discharge Placement: Skilled nursing facility

## 2021-04-20 NOTE — PROGRESS NOTES
No acute events overnight. Patient resting quietly in bed. Respirations present, even and unlabored on 4L NC. Bed low and locked, safety measures in place. D5 infusing at 25mL/hr. Clifton in place, patent and draining clear yellow urine. No signs of distress, no needs expressed. Encouraged to call with needs. Preparing to give report to oncoming RN.

## 2021-04-20 NOTE — PROGRESS NOTES
Late entry progress note for visit on 4.19.21.  follow-up visit at the request of staff. Marisela Larkin MDiv Board Certified Mariel Samuel

## 2021-04-20 NOTE — PROGRESS NOTES
Report received from [facility]. Patient resting quietly in bed. Respirations present, even and unlabored on 4L NC. Bed low and locked, safety measures in place. D5 infusing at 25mL/hr. Clifton in place, patent and draining clear yellow urine. No signs of distress, no needs expressed. Encouraged to call with any needs. Will continue to monitor.

## 2021-04-20 NOTE — PROGRESS NOTES
Pt resting in bed comfortably at this time, alert and oriented times 3 with periodic confusion. No distress noted, respirations even and unlabored on 3 L NC. Pt denies pain at this time. Pt instructed to call for assistance if needed, call light in place, will continue to monitor. Clifton draining appropriately.

## 2021-04-20 NOTE — PROGRESS NOTES
Problem: Falls - Risk of 
Goal: *Absence of Falls Description: Document Dina Edwards Fall Risk and appropriate interventions in the flowsheet. Outcome: Progressing Towards Goal 
Note: Fall Risk Interventions: 
  
 
Mentation Interventions: Adequate sleep, hydration, pain control Medication Interventions: Evaluate medications/consider consulting pharmacy Elimination Interventions: Call light in reach History of Falls Interventions: Evaluate medications/consider consulting pharmacy

## 2021-05-05 PROBLEM — R06.89 HYPERCAPNEMIA: Status: ACTIVE | Noted: 2021-01-01

## 2021-05-05 PROBLEM — I27.20 PULMONARY HTN (HCC): Status: ACTIVE | Noted: 2021-01-01

## 2021-05-05 PROBLEM — E87.5 HYPERKALEMIA: Status: ACTIVE | Noted: 2021-01-01

## 2021-05-05 PROBLEM — J96.11 CHRONIC HYPOXEMIC RESPIRATORY FAILURE (HCC): Status: ACTIVE | Noted: 2021-01-01

## 2021-05-05 PROBLEM — R79.89 ABNORMAL LFTS: Status: ACTIVE | Noted: 2021-01-01

## 2021-05-05 PROBLEM — N39.0 UTI (URINARY TRACT INFECTION): Status: ACTIVE | Noted: 2021-01-01

## 2021-05-05 PROBLEM — I50.31 ACUTE DIASTOLIC CHF (CONGESTIVE HEART FAILURE) (HCC): Status: ACTIVE | Noted: 2021-01-01

## 2021-05-05 PROBLEM — I50.32 DIASTOLIC CHF, CHRONIC (HCC): Status: ACTIVE | Noted: 2021-01-01

## 2021-05-05 NOTE — H&P
[employer] Hospitalist  
History and Physical  
 
 
Name:  Best Veliz  Age:80 y.o. Sex:female :  1940 MRN:  [id number] PCP:  Sea Romero MD 
 
 
Admit Date:  2021 11:22 AM  
Chief Complaint: mental status change Reason for Admission:  
Acute diastolic CHF (congestive heart failure) ([facility] [address] [I50.31] Assessment & Plan:  
 
--Acute and chronic diastolic congestive heart failure IV Lasix 40 mg twice daily, continue metoprolol but change to Toprol-XL and one half dose regarding bradycardia and SC interval.  Continue ARB. Recent echocardiogram with preserved LVEF from April of this year. --Hyperkalemiasodium zirconium cyclosilicate administered in emergency department follow-up serum potassium level on arrival to floor. Follow with IV diuresis. --Metabolic encephalopathymental status back to baseline per daughterhypercarbia noted on admission as well as UTI and likely worsened hypoxemia not documented regarding congestive heart failurefollow 
 
--Macrocytic anemiachronicfollow 
 
--Chronic kidney disease stage IIIrecent serum creatinine 1.46 presenting serum creatinine 1.58follow --Hepatitisunclear etiology may be passive congestion secondary to congestive heart failurecheck hepatitis acute viral panel, hold statin check CPK and follow. Disposition: Stable Diet: DIET DIABETIC CONSISTENT CARB 
VTE ppx: SCDs CODE STATUS: DNR Surrogate decision-maker: Family contact on filedaughter at bedside History of Presenting Illness:  
 
Best Veliz is a 78 y.o. female with medical history of a 3-month hospital stay for COVID-19 pneumonia with a stormy course requiring hemodialysis and multiple endotracheal intubations. She was eventually discharged to skilled nursing facility and has been readmitted multiple times recently several weeks ago.   She has a past history of hypertension coronary disease diabetes mellitus type 2 peripheral sensory neuropathy COPD requiring oxygen, and recent echocardiogram April 17 showing preserved LVEF, diastolic dysfunction as well as significant pulmonary hypertension with PA systolic pressures in the 50s. She is sent back from [facility] with altered mental status staff reported they could not arouse her according to EMS on arrival she was immediately responsive. On presentation she is found to have acute pulmonary edema with a BNP of 2639 with chest x-ray evidence of small bilateral pleural effusions pulmonary congestion. Her serum potassium is 5.3 and this has been treated with a dose of Lokelma. Her LFTs are abnormal with an SGPT of 206 and SGOT of 71 and alk phos 165. She has a hemoglobin of 8 and is chronically anemic with an MCV of 102. She has significant pyuriaUTI but is not febrile and her white blood count is not significantly elevated. She is being admitted with acute on chronic diastolic heart failure. Review of Systems: A 14 point review of systems was taken and pertinent positive as per HPI. Past Medical History:  
Diagnosis Date  Acute cystitis without hematuria 1/30/2019  Acute pancreatitis 8/12/2019  CAD (coronary artery disease)  Diabetic ketoacidosis ([address] 3/8/2020  DM2 (diabetes mellitus, type 2) ([address]  Elevated liver function tests 8/8/2019  Encephalopathy 2/7/2021  Gout  Gram-negative bacteremia 8/9/2019  HLD (hyperlipidemia)  HTN (hypertension)  Peripheral neuropathy  Right lower quadrant abdominal pain 8/8/2019 Past Surgical History:  
Procedure Laterality Date  HX CHOLECYSTECTOMY jennifer in 1964  HX CORONARY ARTERY BYPASS GRAFT    
 x3  
 HX TUBAL LIGATION    
 IR INSERT NON TUNL CVC OVER 5 YRS  1/11/2021  IA CARDIAC SURG PROCEDURE UNLIST    
 stents x 3 2009 Family History : reviewed Family History Problem Relation Age of Onset  Hypertension Mother  Cancer Mother  Diabetes Mother  Heart Disease Mother Social History Tobacco Use  Smoking status: Never Smoker  Smokeless tobacco: Never Used Substance Use Topics  Alcohol use: Yes Comment: socially Allergies Allergen Reactions  Sulfa (Sulfonamide Antibiotics) Swelling Immunization History Administered Date(s) Administered  COVID-19, PFIZER, MRNA, LNP-S, PF, 30MCG/0.3ML DOSE 04/14/2021  Influenza Vaccine 08/18/2014  Influenza Vaccine PF 10/18/2010, 11/09/2011, 10/03/2012  TB Skin Test (PPD) Intradermal 01/30/2019, 08/09/2019, 03/09/2020, 12/28/2020  Zoster Recombinant 07/10/2019 PTA Medications: 
Current Outpatient Medications Medication Instructions  aspirin delayed-release 81 mg tablet Oral, DAILY  atorvastatin (LIPITOR) 40 mg, Oral  
 Blood-Glucose Meter (ONETOUCH ULTRAMINI) monitoring kit Use as directed  clopidogreL (PLAVIX) 75 mg, Oral  
 furosemide (LASIX) 40 mg, Oral, DAILY  gabapentin (NEURONTIN) 100 mg, Oral, 3 TIMES DAILY  glucose blood VI test strips (ONETOUCH ULTRA TEST) strip Test 4 times daily as directed  insulin lispro (HUMALOG) 100 unit/mL injection Please give as per sliding scale  Insulin Needles, Disposable, 31 gauge x 5/16\" ndle SubCUTAneous, 4 TIMES DAILY BEFORE MEALS & NIGHTLY  Lancets (ONETOUCH ULTRASOFT LANCETS) misc Test 4 times daily as directed  losartan (COZAAR) 100 mg tablet Oral, DAILY  metoprolol tartrate (LOPRESSOR) 12.5 mg, Oral, EVERY 12 HOURS  rOPINIRole (REQUIP) 0.5 mg, Oral, BEDTIME PRN  
 rosuvastatin (CRESTOR) 20 mg, Oral, EVERY BEDTIME Objective:  
 
Patient Vitals for the past 24 hrs: 
 Temp Pulse Resp BP SpO2  
05/05/21 1522  (!) 52  (!) 166/74 100 % 05/05/21 1421  (!) 48  (!) 164/71 100 % 05/05/21 1242  (!) 51   100 % 05/05/21 1238  (!) 54  (!) 168/74   
05/05/21 1125 98.7 °F (37.1 °C) (!) 54 16 (!) 140/60 91 % Oxygen Therapy O2 Sat (%): 100 % (05/05/21 1522) Pulse via Oximetry: 52 beats per minute (05/05/21 1522) O2 Device: Nasal cannula (05/05/21 1125) O2 Flow Rate (L/min): 2 l/min (05/05/21 1125) Body mass index is 24.13 kg/m². Physical Exam: 
 
General: Distant affect but obeys commands. Answers questions appropriately. Alert to person and hospital. 
HEENT: Pupils equal and reactive to light and accommodation, oropharynx is clear Neck: Supple, no lymphadenopathy, positive JVD. Lungs: Pulmonary rales bilateral anterior and posterior superior and inferior. No use of accessory muscles for respiration. Cardiovascular: Bradycardic heart rate around 50. No dominant murmurs. Pitting bilateral lower extremity edema Abdomen: Soft, nontender, nondistended, normoactive bowel sounds Extremities: No cyanosis clubbing able to move all 4 extremities Neuro: Nonfocal, A&O x3 Data Reviewed: I have reviewed all labs, meds, and studies. Recent Results (from the past 24 hour(s)) CBC WITH AUTOMATED DIFF Collection Time: 05/05/21 11:34 AM  
Result Value Ref Range WBC 5.2 4.3 - 11.1 K/uL  
 RBC 2.43 (L) 4.05 - 5.2 M/uL HGB 8.0 (L) 11.7 - 15.4 g/dL HCT 24.8 (L) 35.8 - 46.3 % .1 (H) 79.6 - 97.8 FL  
 MCH 32.9 26.1 - 32.9 PG  
 MCHC 32.3 31.4 - 35.0 g/dL  
 RDW 16.7 (H) 11.9 - 14.6 % PLATELET 094 (L) 892 - 450 K/uL MPV 11.7 9.4 - 12.3 FL ABSOLUTE NRBC 0.00 0.0 - 0.2 K/uL  
 DF AUTOMATED NEUTROPHILS 59 43 - 78 % LYMPHOCYTES 30 13 - 44 % MONOCYTES 7 4.0 - 12.0 % EOSINOPHILS 3 0.5 - 7.8 % BASOPHILS 1 0.0 - 2.0 % IMMATURE GRANULOCYTES 0 0.0 - 5.0 %  
 ABS. NEUTROPHILS 3.1 1.7 - 8.2 K/UL  
 ABS. LYMPHOCYTES 1.5 0.5 - 4.6 K/UL  
 ABS. MONOCYTES 0.4 0.1 - 1.3 K/UL  
 ABS. EOSINOPHILS 0.2 0.0 - 0.8 K/UL  
 ABS. BASOPHILS 0.1 0.0 - 0.2 K/UL  
 ABS. IMM. GRANS. 0.0 0.0 - 0.5 K/UL METABOLIC PANEL, COMPREHENSIVE Collection Time: 05/05/21 11:34 AM  
Result Value Ref Range Sodium 144 136 - 145 mmol/L  Potassium 5.3 (H) 3.5 - 5.1 mmol/L Chloride 114 (H) 98 - 107 mmol/L  
 CO2 28 21 - 32 mmol/L Anion gap 2 (L) 7 - 16 mmol/L Glucose 149 (H) 65 - 100 mg/dL BUN 42 (H) 8 - 23 MG/DL Creatinine 1.58 (H) 0.6 - 1.0 MG/DL  
 GFR est AA 40 (L) >60 ml/min/1.73m2 GFR est non-AA 33 (L) >60 ml/min/1.73m2 Calcium 8.8 8.3 - 10.4 MG/DL Bilirubin, total 0.2 0.2 - 1.1 MG/DL  
 ALT (SGPT) 206 (H) 12 - 65 U/L  
 AST (SGOT) 71 (H) 15 - 37 U/L Alk. phosphatase 165 (H) 50 - 136 U/L Protein, total 6.5 6.3 - 8.2 g/dL Albumin 2.9 (L) 3.2 - 4.6 g/dL Globulin 3.6 (H) 2.3 - 3.5 g/dL A-G Ratio 0.8 (L) 1.2 - 3.5 LACTIC ACID Collection Time: 05/05/21 11:34 AM  
Result Value Ref Range Lactic acid 1.1 0.4 - 2.0 MMOL/L  
MAGNESIUM Collection Time: 05/05/21 11:34 AM  
Result Value Ref Range Magnesium 2.3 1.8 - 2.4 mg/dL PROCALCITONIN Collection Time: 05/05/21 11:34 AM  
Result Value Ref Range Procalcitonin <0.05 ng/mL NT-PRO BNP Collection Time: 05/05/21 11:34 AM  
Result Value Ref Range NT pro-BNP 2,639 (H) <450 PG/ML  
TROPONIN-HIGH SENSITIVITY Collection Time: 05/05/21 11:34 AM  
Result Value Ref Range Troponin-High Sensitivity 31.7 (H) 0 - 14 pg/mL BLOOD GAS, ARTERIAL POC Collection Time: 05/05/21 11:41 AM  
Result Value Ref Range Device: NASAL CANNULA pH (POC) 7.32 (L) 7.35 - 7.45    
 pCO2 (POC) 58.1 (H) 35 - 45 MMHG  
 pO2 (POC) 237 (H) 75 - 100 MMHG  
 HCO3 (POC) 29.7 (H) 22 - 26 MMOL/L  
 sO2 (POC) 99.8 (H) 95 - 98 % Base excess (POC) 3.1 mmol/L Allens test (POC) Positive Site RIGHT RADIAL Specimen type (POC) ARTERIAL Performed by Sue   
 FIO2, L/min 3 EKG, 12 LEAD, INITIAL Collection Time: 05/05/21 11:42 AM  
Result Value Ref Range Ventricular Rate 50 BPM  
 Atrial Rate 50 BPM  
 P-R Interval 246 ms  
 QRS Duration 88 ms Q-T Interval 482 ms QTC Calculation (Bezet) 439 ms Calculated P Axis 36 degrees  Calculated R Axis 59 degrees Calculated T Axis 109 degrees Diagnosis Sinus bradycardia with 1st degree A-V block Cannot rule out Anterior infarct (cited on or before 16-APR-2021) T wave abnormality, consider lateral ischemia Abnormal ECG When compared with ECG of 16-APR-2021 12:24, No significant change was found Confirmed by Kin Jauregui [id number]) on 5/5/2021 5:14:17 PM 
  
URINE MICROSCOPIC Collection Time: 05/05/21  2:22 PM  
Result Value Ref Range WBC >100 0 /hpf  
 RBC 0-3 0 /hpf Epithelial cells 0-3 0 /hpf Bacteria 4+ (H) 0 /hpf Casts 0 0 /lpf Crystals, urine 0 0 /LPF Mucus 0 0 /lpf Yeast OCCASIONAL Other observations RESULTS VERIFIED MANUALLY EKG Results Procedure 720 Value Units Date/Time EKG, 12 LEAD, INITIAL [[id number]] Collected: 05/05/21 1142 Order Status: Completed Updated: 05/05/21 1714 Ventricular Rate 50 BPM   
  Atrial Rate 50 BPM   
  P-R Interval 246 ms   
  QRS Duration 88 ms Q-T Interval 482 ms QTC Calculation (Bezet) 439 ms Calculated P Axis 36 degrees Calculated R Axis 59 degrees Calculated T Axis 109 degrees Diagnosis --  
  Sinus bradycardia with 1st degree A-V block Cannot rule out Anterior infarct (cited on or before 16-APR-2021) T wave abnormality, consider lateral ischemia Abnormal ECG When compared with ECG of 16-APR-2021 12:24, No significant change was found Confirmed by Kin Jauregui ([id number]) on 5/5/2021 5:14:17 PM 
  
  
 
 
All Micro Results Procedure Component Value Units Date/Time CULTURE, BLOOD [[id number]] Collected: 05/05/21 1505 Order Status: Completed Specimen: Blood Updated: 05/05/21 1527 CULTURE, BLOOD [[id number]] Order Status: Sent Specimen: Blood CULTURE, URINE [[id number]] Order Status: Sent Specimen: Urine from Clean catch Other Studies: 
Ct Head Wo Cont Result Date: 5/5/2021 EXAM: CT HEAD WITHOUT CONTRAST INDICATION: Difficulty awakening, AMS. COMPARISON: Head CT 4/16/2021 TECHNIQUE: Contiguous axial images were obtained from the skull base through the vertex without IV contrast. Radiation dose reduction techniques were used for this study. Our CT scanners use one or all of the following: Automated exposure control, adjustment of the mA and/or kV according to patient size, iterative reconstruction. FINDINGS: Global parenchymal volume loss and ex vacuo ventriculomegaly. Periventricular white matter hypoattenuation reflects sequelae of small vessel ischemic disease. No acute infarction or hemorrhage. Remote right PCA territory infarction. Remote lacunar infarct of the left basal ganglia. No hydrocephalus or midline shift. No extra-axial mass or hemorrhage. The basal cisterns are patent. The visualized portions of the orbits are normal. The mastoid air cells and paranasal sinuses are patent. The visualized vascular structures have an unremarkable noncontrast appearance. The calvarium and soft tissues appear normal.  
 
No acute intracranial abnormality evident by CT. Xr Chest [facility] Result Date: 5/5/2021 EXAM: CHEST X-RAY, 1 VIEW INDICATION: AMS; hypoxia. COMPARISON: 4/18/2021 TECHNIQUE: Single AP view of the chest was obtained. FINDINGS: The cardiac silhouette is enlarged. Pulmonary vascular congestion and diffusely coarse interstitial markings, slightly improved compared to the 4/18/2021 exam. Suspect small bilateral effusions. Degenerative changes of the shoulders and spine. 1.  Cardiomegaly with pulmonary vascular congestion and edema. 2.  Suspected small bilateral effusions. Medications: 
Medications Administered   
 cefTRIAXone (ROCEPHIN) 1 g in 0.9% sodium chloride (MBP/ADV) 50 mL MBP Admin Date 05/05/2021 Action New Bag Dose 1 g Rate 
100 mL/hr Route IntraVENous Administered By Bonita Rivera RN  
  
  
 furosemide (LASIX) injection 40 mg   
 Admin Date 05/05/2021 Action Given Dose 40 mg Route IntraVENous Administered By 
Rachel Caruso RN  
  
  
 sodium zirconium cyclosilicate (LOKELMA) powder packet 5 g Admin Date 05/05/2021 Action Given Dose 
5 g Route Oral Administered By Rachel Caruso RN  
  
  
  
 
 
 
 
Problem List:  
 
Hospital Problems as of 5/5/2021 Date Reviewed: 4/19/2021 Codes Class Noted - Resolved POA * (Principal) Acute diastolic CHF (congestive heart failure) (HCC) ICD-10-CM: I50.31 ICD-9-CM: 428.31, 428.0  5/5/2021 - Present Unknown Diastolic CHF, chronic (HCC) ICD-10-CM: I50.32 
ICD-9-CM: 428.32, 428.0  5/5/2021 - Present Unknown Pulmonary HTN ([facility] [address] ICD-10-CM: I27.20 ICD-9-CM: 416.8  5/5/2021 - Present Unknown Hyperkalemia ICD-10-CM: E87.5 ICD-9-CM: 276.7  5/5/2021 - Present Unknown UTI (urinary tract infection) ICD-10-CM: N39.0 ICD-9-CM: 599.0  5/5/2021 - Present Unknown Chronic hypoxemic respiratory failure [facility]) ICD-10-CM: J96.11 
ICD-9-CM: 518.83, 799.02  5/5/2021 - Present Unknown Hypercapnemia ICD-10-CM: R06.89 
ICD-9-CM: 786.09  5/5/2021 - Present Unknown Abnormal LFTs ICD-10-CM: R94.5 ICD-9-CM: 790.6  5/5/2021 - Present Unknown Metabolic encephalopathy FNQ-[id number]: G93.41 
ICD-9-CM: 348.31  1/30/2019 - Present Unknown Uncontrolled type 2 diabetes mellitus with hyperglycemia (HCC) (Chronic) ICD-10-CM: E11.65 ICD-9-CM: 250.02  6/15/2015 - Present Yes Essential hypertension (Chronic) ICD-10-CM: I10 
ICD-9-CM: 401.9  6/15/2015 - Present Yes HLD (hyperlipidemia) (Chronic) ICD-10-CM: X62.0 ICD-9-CM: 272.4  6/15/2015 - Present Yes Chronic kidney disease, stage III (moderate) (HCC) (Chronic) ICD-10-CM: N18.30 ICD-9-CM: 585.3  6/15/2015 - Present Yes Signed By: DO Krzysztof Spaulding [facility]  May 5, 2021  5:30 PM

## 2021-05-05 NOTE — PROGRESS NOTES
Care Management Interventions PCP Verified by CM: Yes Mode of Transport at Discharge: Other (see comment) Transition of Care Consult (CM Consult): Discharge Planning, SNF Discharge Durable Medical Equipment: No 
Physical Therapy Consult: No 
Occupational Therapy Consult: No 
Speech Therapy Consult: No 
Current Support Network: [address] [place] [address] Confirm Follow Up Transport: Family Discharge Location Discharge Placement: Skilled nursing facility CM met with pt to discuss CM needs & DCP. Pt is alert but confused. CM spoke with Reanna Roy at [facility]. Pt is a resident of [facility] was there under rehab but will transition to LTC once Medicaid application is complete. If pt is admitted patient will need new auth to return to facility. Auth will require new PT OT eval. DCP STR. CM to continue to monitor.

## 2021-05-05 NOTE — ED TRIAGE NOTES
Pt arrived via [facility] from [facility] c/o difficult to arouse this AM. EMS reports that pt was alert at time of arrival. Denies any complaints. Pt on 2L NC at baseline for CHF.

## 2021-05-05 NOTE — ED PROVIDER NOTES
49-year-old female with history of hypertension, CAD, type 2 diabetes mellitus, peripheral neuropathy, history of prolonged hospitalization for 3 months with BUSVS-26 pneumonia complicated by ARDS, multiple intubations, PRATIMA requiring dialysis who presents via EMS from [facility] with difficulty arousing from sleep this morning. Family states that he took multiple staff members checking her trying to wake her up. EMS states that when they arrived patient was alert without any issues or complaints. Patient does report generalized weakness and fatigue. Patient on 2 L O2 via nasal cannula at baseline. The history is provided by the patient. No  was used. Fatigue This is a new problem. The problem has not changed since onset. There was no focality noted. Pertinent negatives include no focal weakness, no slurred speech and no speech difficulty. There has been no fever. Associated symptoms include shortness of breath and confusion. Pertinent negatives include no chest pain, no vomiting, no headaches and no nausea. Past Medical History:  
Diagnosis Date  Acute cystitis without hematuria 1/30/2019  Acute pancreatitis 8/12/2019  CAD (coronary artery disease)  Diabetic ketoacidosis ([facility] [address] 3/8/2020  DM2 (diabetes mellitus, type 2) ([facility] [address]  Elevated liver function tests 8/8/2019  Encephalopathy 2/7/2021  Gout  Gram-negative bacteremia 8/9/2019  HLD (hyperlipidemia)  HTN (hypertension)  Peripheral neuropathy  Right lower quadrant abdominal pain 8/8/2019 Past Surgical History:  
Procedure Laterality Date  HX CHOLECYSTECTOMY jennifer in 1964  HX CORONARY ARTERY BYPASS GRAFT    
 x3  
 HX TUBAL LIGATION    
 IR INSERT NON TUNL CVC OVER 5 YRS  1/11/2021  [place] CARDIAC SURG PROCEDURE UNLIST    
 stents x 3 2009 Family History:  
Problem Relation Age of Onset  Hypertension Mother  Cancer Mother  Diabetes Mother  Heart Disease Mother Social History Socioeconomic History  Marital status: SINGLE Spouse name: Not on file  Number of children: Not on file  Years of education: Not on file  Highest education level: Not on file Occupational History  Occupation: retired  Social Needs  Financial resource strain: Not on file  Food insecurity Worry: Not on file Inability: Not on file  Transportation needs Medical: Not on file Non-medical: Not on file Tobacco Use  Smoking status: Never Smoker  Smokeless tobacco: Never Used Substance and Sexual Activity  Alcohol use: Yes Comment: socially  Drug use: No  
 Sexual activity: Not on file Lifestyle  Physical activity Days per week: Not on file Minutes per session: Not on file  Stress: Not on file Relationships  Social connections Talks on phone: Not on file Gets together: Not on file Attends [employer] service: Not on file Active member of club or organization: Not on file Attends meetings of clubs or organizations: Not on file Relationship status: Not on file  Intimate partner violence Fear of current or ex partner: Not on file Emotionally abused: Not on file Physically abused: Not on file Forced sexual activity: Not on file Other Topics Concern  Not on file Social History Narrative Lives alone but has a caregiver who helps several hours per day, several days per week ALLERGIES: Sulfa (sulfonamide antibiotics) Review of Systems Constitutional: Positive for fatigue. Negative for chills, diaphoresis and fever. HENT: Negative for congestion and rhinorrhea. Respiratory: Positive for shortness of breath. Negative for wheezing. Cardiovascular: Negative for chest pain. Gastrointestinal: Negative for abdominal pain, diarrhea, nausea and vomiting. Genitourinary: Negative for dysuria and flank pain.   
Musculoskeletal: Negative for back pain and myalgias. Skin: Negative for color change and rash. Neurological: Negative for dizziness, focal weakness, seizures, speech difficulty and headaches. Psychiatric/Behavioral: Positive for confusion. Vitals:  
 05/05/21 1125 BP: (!) 140/60 Pulse: (!) 54 Resp: 16 Temp: 98.7 °F (37.1 °C) SpO2: 91% Weight: 65.8 kg (145 lb) Height: 5' 5\" (1.651 m) Physical Exam 
Vitals signs and nursing note reviewed. Constitutional:   
   Appearance: Normal appearance. HENT:  
   Head: Normocephalic. Mouth/Throat:  
   Mouth: Mucous membranes are moist.  
Eyes:  
   Extraocular Movements: Extraocular movements intact. Pupils: Pupils are equal, round, and reactive to light. Neck: Musculoskeletal: Normal range of motion. No neck rigidity. Cardiovascular:  
   Rate and Rhythm: Bradycardia present. Pulses: Normal pulses. Heart sounds: Normal heart sounds. Pulmonary:  
   Effort: Pulmonary effort is normal.  
   Breath sounds: Normal breath sounds. Abdominal:  
   General: Bowel sounds are normal.  
   Palpations: Abdomen is soft. Tenderness: There is no abdominal tenderness. There is no guarding. Comments: Soft, nontender, nondistended. No rebound or guarding. Musculoskeletal: Normal range of motion. Right lower leg: Edema present. Left lower leg: Edema present. Comments: 2-3+ pitting bilateral LE edema. Skin: 
   Findings: No erythema or rash. Neurological:  
   General: No focal deficit present. Mental Status: She is alert and oriented to person, place, and time. Comments: No focal deficits. No facial droop. No dysarthria. No meningismus. MDM Number of Diagnoses or Management Options Acute cystitis without hematuria: new and requires workup Acute hyperkalemia: new and requires workup Bilateral pleural effusion: new and requires workup Congestive heart failure, unspecified HF chronicity, unspecified heart failure type [facility]): new and requires workup Diagnosis management comments: Bradycardic. Remainder of vital stable. K 5.3. Mook Shoulders ordered. CXR w/ bilateral pleural effusions, cardiomegaly, and pulmonary vascular congestion and edema. Lasix 40 mg IV ordered. CT head w/ no acute findings. Urine dipstick w/ large leukocyte esterase. Urine micro added on. Rocephin 1 g IV added on. Will consult hospitalist for admission. Amount and/or Complexity of Data Reviewed Clinical lab tests: ordered and reviewed Tests in the radiology section of CPT®: ordered and reviewed Tests in the medicine section of CPT®: ordered and reviewed Review and summarize past medical records: yes Discuss the patient with other providers: yes Independent visualization of images, tracings, or specimens: yes Risk of Complications, Morbidity, and/or Mortality Presenting problems: moderate Diagnostic procedures: moderate Management options: moderate Patient Progress Patient progress: stable ED Course as of May 05 1435 Wed May 05, 2021  
1241 CXR IMPRESSION:  
1. Cardiomegaly with pulmonary vascular congestion and edema. 2.  Suspected small bilateral effusions. [DF] 1345 CT HEAD WO CONT [DF] 1345 CT head IMPRESSION:  
No acute intracranial abnormality evident by CT.  
 [DF] 1345 NT pro-BNP(!): 2,639 [DF] 1423 Potassium(!): 5.3 [DF] 1434 Urine dipstick with large leukocyte esterase. Urine micro added on.   
 [DF] ED Course User Index 
[DF] Feliberto Shirley MD  
 
 
EKG Date/Time: 5/5/2021 2:35 PM 
Performed by: Feliberto Shirley MD 
Authorized by: Feliberto Shirley MD  
 
ECG reviewed by ED Physician in the absence of a cardiologist: yes Rate:  
  ECG rate:  50 ECG rate assessment: bradycardic Rhythm:  
  Rhythm: sinus bradycardia Ectopy:  
  Ectopy: none QRS:  
  QRS axis:  Normal 
  QRS intervals:  Normal 
Conduction: Conduction: normal   
ST segments: ST segments:  Normal 
 
 
 
 
Results Include: 
 
Recent Results (from the past 24 hour(s)) CBC WITH AUTOMATED DIFF Collection Time: 05/05/21 11:34 AM  
Result Value Ref Range WBC 5.2 4.3 - 11.1 K/uL  
 RBC 2.43 (L) 4.05 - 5.2 M/uL HGB 8.0 (L) 11.7 - 15.4 g/dL HCT 24.8 (L) 35.8 - 46.3 % .1 (H) 79.6 - 97.8 FL  
 MCH 32.9 26.1 - 32.9 PG  
 MCHC 32.3 31.4 - 35.0 g/dL  
 RDW 16.7 (H) 11.9 - 14.6 % PLATELET 528 (L) 609 - 450 K/uL MPV 11.7 9.4 - 12.3 FL ABSOLUTE NRBC 0.00 0.0 - 0.2 K/uL  
 DF AUTOMATED NEUTROPHILS 59 43 - 78 % LYMPHOCYTES 30 13 - 44 % MONOCYTES 7 4.0 - 12.0 % EOSINOPHILS 3 0.5 - 7.8 % BASOPHILS 1 0.0 - 2.0 % IMMATURE GRANULOCYTES 0 0.0 - 5.0 %  
 ABS. NEUTROPHILS 3.1 1.7 - 8.2 K/UL  
 ABS. LYMPHOCYTES 1.5 0.5 - 4.6 K/UL  
 ABS. MONOCYTES 0.4 0.1 - 1.3 K/UL  
 ABS. EOSINOPHILS 0.2 0.0 - 0.8 K/UL  
 ABS. BASOPHILS 0.1 0.0 - 0.2 K/UL  
 ABS. IMM. GRANS. 0.0 0.0 - 0.5 K/UL METABOLIC PANEL, COMPREHENSIVE Collection Time: 05/05/21 11:34 AM  
Result Value Ref Range Sodium 144 136 - 145 mmol/L Potassium 5.3 (H) 3.5 - 5.1 mmol/L Chloride 114 (H) 98 - 107 mmol/L  
 CO2 28 21 - 32 mmol/L Anion gap 2 (L) 7 - 16 mmol/L Glucose 149 (H) 65 - 100 mg/dL BUN 42 (H) 8 - 23 MG/DL Creatinine 1.58 (H) 0.6 - 1.0 MG/DL  
 GFR est AA 40 (L) >60 ml/min/1.73m2 GFR est non-AA 33 (L) >60 ml/min/1.73m2 Calcium 8.8 8.3 - 10.4 MG/DL Bilirubin, total 0.2 0.2 - 1.1 MG/DL  
 ALT (SGPT) 206 (H) 12 - 65 U/L  
 AST (SGOT) 71 (H) 15 - 37 U/L Alk. phosphatase 165 (H) 50 - 136 U/L Protein, total 6.5 6.3 - 8.2 g/dL Albumin 2.9 (L) 3.2 - 4.6 g/dL Globulin 3.6 (H) 2.3 - 3.5 g/dL A-G Ratio 0.8 (L) 1.2 - 3.5 LACTIC ACID Collection Time: 05/05/21 11:34 AM  
Result Value Ref Range Lactic acid 1.1 0.4 - 2.0 MMOL/L  
MAGNESIUM Collection Time: 05/05/21 11:34 AM  
Result Value Ref Range  Magnesium 2.3 1.8 - 2.4 mg/dL PROCALCITONIN Collection Time: 05/05/21 11:34 AM  
Result Value Ref Range Procalcitonin <0.05 ng/mL NT-PRO BNP Collection Time: 05/05/21 11:34 AM  
Result Value Ref Range NT pro-BNP 2,639 (H) <450 PG/ML  
TROPONIN-HIGH SENSITIVITY Collection Time: 05/05/21 11:34 AM  
Result Value Ref Range Troponin-High Sensitivity 31.7 (H) 0 - 14 pg/mL BLOOD GAS, ARTERIAL POC Collection Time: 05/05/21 11:41 AM  
Result Value Ref Range Device: NASAL CANNULA pH (POC) 7.32 (L) 7.35 - 7.45    
 pCO2 (POC) 58.1 (H) 35 - 45 MMHG  
 pO2 (POC) 237 (H) 75 - 100 MMHG  
 HCO3 (POC) 29.7 (H) 22 - 26 MMOL/L  
 sO2 (POC) 99.8 (H) 95 - 98 % Base excess (POC) 3.1 mmol/L Allens test (POC) Positive Site RIGHT RADIAL Specimen type (POC) ARTERIAL Performed by Sue   
 FIO2, L/min 3 EKG, 12 LEAD, INITIAL Collection Time: 05/05/21 11:42 AM  
Result Value Ref Range Ventricular Rate 50 BPM  
 Atrial Rate 50 BPM  
 P-R Interval 246 ms  
 QRS Duration 88 ms Q-T Interval 482 ms QTC Calculation (Bezet) 439 ms Calculated P Axis 36 degrees Calculated R Axis 59 degrees Calculated T Axis 109 degrees Diagnosis    
  !! AGE AND GENDER SPECIFIC ECG ANALYSIS !! Sinus bradycardia with 1st degree A-V block Cannot rule out Anterior infarct (cited on or before 16-APR-2021) T wave abnormality, consider lateral ischemia Abnormal ECG When compared with ECG of 16-APR-2021 12:24, No significant change was found Elzbieta Tovar MD; 5/5/2021 @11:37 AM Voice dictation software was used during the making of this note. This software is not perfect and grammatical and other typographical errors may be present.   This note has not been proofread for errors. 
===================================================================

## 2021-05-05 NOTE — ED NOTES
TRANSFER - OUT REPORT: 
 
Verbal report given to Jonathon Lizarraga RN on Erlinda Rubio  being transferred to [facility] for routine progression of care Report consisted of patients Situation, Background, Assessment and  
Recommendations(SBAR). Information from the following report(s) SBAR, ED Summary, [facility] and Recent Results was reviewed with the receiving nurse. Lines:  
Peripheral IV 05/05/21 Left Hand (Active) Site Assessment Clean, dry, & intact 05/05/21 1132 Phlebitis Assessment 0 05/05/21 1132 Infiltration Assessment 0 05/05/21 1132 Dressing Status Clean, dry, & intact 05/05/21 1132 Opportunity for questions and clarification was provided. Patient transported with: 
 O2 @ 5 liters Tech

## 2021-05-06 PROBLEM — K59.00 CONSTIPATION: Status: ACTIVE | Noted: 2021-01-01

## 2021-05-06 NOTE — PROGRESS NOTES
TRANSFER - IN REPORT: 
 
Verbal report received from KATYA KIRKPATRICK [facility] RN(name) on Shahbaz Reyes  being received from ER(unit) for routine progression of care Report consisted of patients Situation, Background, Assessment and  
Recommendations(SBAR). Information from the following report(s) SBAR, Kardex, ED Summary, Procedure Summary, Intake/Output, MAR and Recent Results was reviewed with the receiving nurse. Opportunity for questions and clarification was provided. Assessment will be completed upon patients arrival to unit and care assumed.

## 2021-05-06 NOTE — PROGRESS NOTES
PT/OT evals ordered. Pt can return to [facility] but will require a new insurance auth to do so. Once evals are available, facility will initiate the precert. Rapid COVID test will be ordered closer to [place].

## 2021-05-06 NOTE — DISCHARGE INSTRUCTIONS
Patient Education        Heart Failure: Care Instructions  Your Care Instructions     Heart failure occurs when your heart does not pump as much blood as the body needs. Failure does not mean that the heart has stopped pumping but rather that it is not pumping as well as it should. Over time, this causes fluid buildup in your lungs and other parts of your body. Fluid buildup can cause shortness of breath, fatigue, swollen ankles, and other problems. By taking medicines regularly, reducing sodium (salt) in your diet, checking your weight every day, and making lifestyle changes, you can feel better and live longer. Follow-up care is a key part of your treatment and safety. Be sure to make and go to all appointments, and call your doctor if you are having problems. It's also a good idea to know your test results and keep a list of the medicines you take. How can you care for yourself at home? Medicines    · Be safe with medicines. Take your medicines exactly as prescribed. Call your doctor if you think you are having a problem with your medicine.     · Do not take any vitamins, over-the-counter medicine, or herbal products without talking to your doctor first. Jaky Cabrera not take ibuprofen (Advil or Motrin) and naproxen (Aleve) without talking to your doctor first. They could make your heart failure worse.     · You may take some of the following medicine. ? Angiotensin-converting enzyme inhibitors (ACEIs) or angiotensin II receptor blockers (ARBs) reduce the heart's workload, lower blood pressure, and reduce swelling. Taking an ACEI or ARB may lower your chance of needing to be hospitalized. ? Beta-blockers can slow heart rate, decrease blood pressure, and improve your condition. Taking a beta-blocker may lower your chance of needing to be hospitalized. ? Diuretics, also called water pills, reduce swelling. You will get more details on the specific medicines your doctor prescribes.   Diet    · Your doctor may suggest that you limit sodium. Your doctor can tell you how much sodium is right for you. An example is less than 3,000 mg a day. This includes all the salt you eat in cooking or in packaged foods. People get most of their sodium from processed foods. Fast food and restaurant meals also tend to be very high in sodium.     · Ask your doctor how much liquid you can drink each day. You may have to limit liquids. Weight    · Weigh yourself without clothing at the same time each day. Record your weight. Call your doctor if you have a sudden weight gain, such as more than 2 to 3 pounds in a day or 5 pounds in a week. (Your doctor may suggest a different range of weight gain.) A sudden weight gain may mean that your heart failure is getting worse. Activity level    · Start light exercise (if your doctor says it is okay). Even if you can only do a small amount, exercise will help you get stronger, have more energy, and manage your weight and your stress. Walking is an easy way to get exercise. Start out by walking a little more than you did before. Bit by bit, increase the amount you walk.     · When you exercise, watch for signs that your heart is working too hard. You are pushing yourself too hard if you cannot talk while you are exercising. If you become short of breath or dizzy or have chest pain, stop, sit down, and rest.     · If you feel \"wiped out\" the day after you exercise, walk slower or for a shorter distance until you can work up to a better pace.     · Get enough rest at night. Sleeping with 1 or 2 pillows under your upper body and head may help you breathe easier. Lifestyle changes    · Do not smoke. Smoking can make a heart condition worse. If you need help quitting, talk to your doctor about stop-smoking programs and medicines. These can increase your chances of quitting for good.  Quitting smoking may be the most important step you can take to protect your heart.     · Limit alcohol to 2 drinks a day for men and 1 drink a day for women. Too much alcohol can cause health problems.     · Avoid getting sick from colds and the flu. Get a pneumococcal vaccine shot. If you have had one before, ask your doctor whether you need another dose. Get a flu shot each year. If you must be around people with colds or the flu, wash your hands often. When should you call for help? Call 911 if you have symptoms of sudden heart failure such as:    · You have severe trouble breathing.     · You cough up pink, foamy mucus.     · You have a new irregular or rapid heartbeat. Call your doctor now or seek immediate medical care if:    · You have new or increased shortness of breath.     · You are dizzy or lightheaded, or you feel like you may faint.     · You have sudden weight gain, such as more than 2 to 3 pounds in a day or 5 pounds in a week. (Your doctor may suggest a different range of weight gain.)     · You have increased swelling in your legs, ankles, or feet.     · You are suddenly so tired or weak that you cannot do your usual activities. Watch closely for changes in your health, and be sure to contact your doctor if you develop new symptoms. Where can you learn more? Go to http://www.gray.com/  Enter E299 in the search box to learn more about \"Heart Failure: Care Instructions. \"  Current as of: August 31, 2020               Content Version: 12.8  © 2218-1340 [employer]. Care instructions adapted under license by [employer] (which disclaims liability or warranty for this information). If you have questions about a medical condition or this instruction, always ask your healthcare professional. [place] [address] any warranty or liability for your use of this information.

## 2021-05-06 NOTE — H&P
[facility] Cardiology Initial Cardiac Evaluation Date of  Admission: 5/5/2021 11:22 AM  
 
Primary Care Physician: Angelica Parker MD 
Primary Cardiologist: [facility] Referring Physician: Dr Radha Paz 
Attending Physician: Dr Precious Garzon CC: CHF Silvino Navarro is a 78 y.o. female admitted for Acute diastolic CHF (congestive heart failure) ([facility] [address] [I50.31]. She has a h/o DM, htn, hyperlipidemia, CKD, venous insufficiency and CAD s/p CABG in 2014 w LIMA to LAD, SVG to OM and SVG to PDA. She is followed by [facility]. She was admitted  w Covid w ARDS, bradycardic arrest due to precedex, long intubation, was d/c to rehab 3-9, readmit 4-20 w PNA and d/c back to rehab on lasix every day but per daughter pt was taking it as needed. Echo 4-17-21 w EF 55-60%, RV peak pressure 50-55, mod PREETHI, mild-mod MR, mod TR. The patient was in rehab when yesterday the staff couldn't wake the patient, she was lethargic and brought to the ER where she was noted to be hypercarbic and dx with UTI. PBNP 2639, procal less than .05, WBC 5, hgb 7.7, , K 4.9, cr 1.6 (up from 1.46 on d/c). CXR showed cardiomegaly w vascular congestion and edema. O2 sat on 2L dropped to 87% and she is now on 5L. EKG SB w lateral t wave inversion. Pt is now more alert, denies CP, SOB, dizziness, palpitations, syncope, LE edema. Weight has been stable. Still very weak. At rehab daughter reports she has been given lasix some to help with LE edema. No cough, F/C. Supposed to get second covid vaccine today. /98. Started on IV lasix, has diuresed - 1.15L and she feels fine with no complaints. CT head no acute process. Cardiac work up: 
CABG in 2014 w LIMA to LAD, SVG to OM and SVG to PDA Echo 4-17-21 w EF 55-60%, RV peak pressure 50-55, mod PREETHI, mild-mod MR, mod TR Allergies: Sulfa Soc: No h/o tobacco 
FH: Mom CAD Patient Active Problem List  
Diagnosis Code  Diabetes mellitus with hyperosmolarity without hyperglycemic hyperosmolar nonketotic coma ([facility] [address] E11.00  Coronary artery disease involving coronary bypass graft of native heart without angina pectoris I25.810  
 Uncontrolled type 2 diabetes mellitus with hyperglycemia ([facility]) E11.65  
 Essential hypertension I10  
 HLD (hyperlipidemia) E78.5  Chronic kidney disease, stage III (moderate) ([facility]) N18.30  
 Osteopenia M85.80  Vitamin D deficiency E55.9  Gastroesophageal reflux disease without esophagitis K21.9  Other allergic rhinitis J30.89  Peripheral neuropathy G62.9  
 Primary osteoarthritis involving multiple joints M89.49  
 Insomnia G47.00  RLS (restless legs syndrome) L86.02  
 Metabolic encephalopathy U18.94  
 Anemia D64.9  Generalized weakness R53.1  Decreased oral intake R63.8  Acute on chronic renal failure (HCC) N17.9, N18.9  Noncompliance Z91.19  
 Hypoalbuminemia due to protein-calorie malnutrition ([facility] [address] E88.09, E46  
 COVID-19 U07.1  Acute respiratory distress syndrome (ARDS) due to severe acute respiratory syndrome coronavirus 2 (SARS-CoV-2) ([facility]) U07.1, J80  Acute hypoxemic respiratory failure ([facility]) J96.01  
 Cardiac arrest ([facility]) I46.9  CHF exacerbation ([facility]) I50.9  Acute diastolic CHF (congestive heart failure) ([facility]) M86.90  
 Diastolic CHF, chronic ([facility]) I50.32  
 Pulmonary HTN ([facility]) I27.20  Hyperkalemia E87.5  UTI (urinary tract infection) N39.0  Chronic hypoxemic respiratory failure ([facility]) J96.11  
 Hypercapnemia R06.89  
 Abnormal LFTs R94.5 Past Medical History:  
Diagnosis Date  Acute cystitis without hematuria 1/30/2019  Acute pancreatitis 8/12/2019  CAD (coronary artery disease)  Diabetic ketoacidosis ([facility] [address] 3/8/2020  DM2 (diabetes mellitus, type 2) ([facility] [address]  Elevated liver function tests 8/8/2019  Encephalopathy 2/7/2021  Gout  Gram-negative bacteremia 8/9/2019  HLD (hyperlipidemia)  HTN (hypertension)  Peripheral neuropathy  Right lower quadrant abdominal pain 8/8/2019 Past Surgical History:  
Procedure Laterality Date  HX CHOLECYSTECTOMY jennifer in 1964  HX CORONARY ARTERY BYPASS GRAFT    
 x3  
 HX TUBAL LIGATION    
 IR INSERT NON TUNL CVC OVER 5 YRS  1/11/2021  OR CARDIAC SURG PROCEDURE UNLIST    
 stents x 3 2009 Allergies Allergen Reactions  Sulfa (Sulfonamide Antibiotics) Swelling Family History Problem Relation Age of Onset  Hypertension Mother  Cancer Mother  Diabetes Mother  Heart Disease Mother Social History Tobacco Use  Smoking status: Never Smoker  Smokeless tobacco: Never Used Substance Use Topics  Alcohol use: Yes Comment: socially Current Facility-Administered Medications Medication Dose Route Frequency  alcohol 62% (NOZIN) nasal  1 Ampule  1 Ampule Topical Q12H  
 sodium chloride (NS) flush 5-40 mL  5-40 mL IntraVENous Q8H  
 sodium chloride (NS) flush 5-40 mL  5-40 mL IntraVENous PRN  
 acetaminophen (TYLENOL) tablet 650 mg  650 mg Oral Q6H PRN Or  
 acetaminophen (TYLENOL) suppository 650 mg  650 mg Rectal Q6H PRN  polyethylene glycol (MIRALAX) packet 17 g  17 g Oral DAILY PRN  promethazine (PHENERGAN) tablet 12.5 mg  12.5 mg Oral Q6H PRN Or  
 ondansetron (ZOFRAN) injection 4 mg  4 mg IntraVENous Q6H PRN  
 aspirin delayed-release tablet 81 mg  81 mg Oral DAILY  [Held by provider] atorvastatin (LIPITOR) tablet 40 mg  40 mg Oral QHS  clopidogreL (PLAVIX) tablet 75 mg  75 mg Oral DAILY  gabapentin (NEURONTIN) capsule 100 mg  100 mg Oral TID  losartan (COZAAR) tablet 100 mg  100 mg Oral DAILY  furosemide (LASIX) injection 40 mg  40 mg IntraVENous Q12H  
 metoprolol succinate (TOPROL-XL) XL tablet 12.5 mg  12.5 mg Oral DAILY  insulin lispro (HUMALOG) injection   SubCUTAneous AC&HS  cefTRIAXone (ROCEPHIN) 1 g in 0.9% sodium chloride (MBP/ADV) 50 mL MBP  1 g IntraVENous Q24H Review of Symptoms: 
General: no weight change,  + weakness, no fever or chills Skin: no rashes, lumps, or other skin changes HEENT: no headache, dizziness, lightheadedness, vision changes, hearing changes, tinnitus, vertigo, sinus pressure/pain, bleeding gums, sore throat, or hoarseness Neck: no swollen glands, goiter, pain or stiffness Respiratory: no cough, sputum, hemoptysis, no dyspnea, wheezing Cardiovascular: + as per HPI Gastrointestinal: + constipation Urinary: no frequency, urgency , hematuria, burning/pain with urination, recent flank pain, polyuria, nocturia, or difficulty urinating Peripheral Vascular: no claudication, leg cramps, prior DVTs, swelling of calves, legs, or feet, color change, or swelling with redness or tenderness Musculoskeletal: no muscle or joint pain/stiffness, joint swelling, erythema of joints, or back pain Psychiatric: no depression or excessive stress Neurological: + AMS Hematologic: no anemia, easy bruising or bleeding Endocrine: no thyroid problems, heat or cold intolerance, excessive sweating, polyuria, polydipsia, +  diabetes. Physical Exam 
Vitals:  
 05/06/21 0427 05/06/21 0758 05/06/21 0800 05/06/21 0915 BP: 126/67 (!) 160/98 Pulse: 60 63 60 Resp: 16 15 Temp: 97.2 °F (36.2 °C) 98 °F (36.7 °C) SpO2: 95% 100% Weight:    65.7 kg (144 lb 14.5 oz) Height:      
 
 
Physical Exam: 
General: Well Developed, Well Nourished, No Acute Distress, 5L O2 by NC 
HEENT: pupils equal and round, no abnormalities noted Neck: supple, + JVD Heart: S1S2 with RRR with 2/6 murmur Lungs: Crackles B mid way up lung Abd: soft, nontender, nondistended, with good bowel sounds Ext: warm, no edema Skin: warm and dry Psychiatric: Normal mood and affect Neurologic: Alert and oriented X 3 Labs:  
Recent Labs 05/06/21 
0453 05/05/21 
2237 05/05/21 
1134  145 144  
K 4.9 5.2* 5.3*  
MG 2.1  --  2.3 BUN 47* 45* 42* CREA 1.60* 1.58* 1.58* GLU 135* 187* 149* WBC 5.1  --  5.2 HGB 7.7*  --  8.0*  
HCT 24.5*  --  24.8*  
*  --  149* INR 1.0  --   --   
 
 
 Assessment/Plan: 
 
 Assessment: Hypoxemic respiratory failure (5/5/2021)- EF 55-60%, hypoxia, assess response to diuresis w IV lasix, monitor renal function closely, taper supplemental O2; not sure all hypoxia related to diastolic heart failure, may be component of post covid syndrome may need pulmonary eval 
 
Uncontrolled type 2 diabetes mellitus with hyperglycemia ([facility] [address] (6/15/2015)- per primary Essential hypertension (6/15/2015)- toprol, cozaar Chronic kidney disease, stage III (moderate) (HCC) (6/15/2015)- monitor w diuresis Metabolic encephalopathy (2/37/8190)- improved on antibiotics Hyperkalemia (5/5/2021)-monitor UTI (urinary tract infection) (5/5/2021)- rocephin Chronic hypoxemic respiratory failure ([facility] [address] (5/5/2021)- 5L O2 by NC Abnormal LFTs (5/5/2021)- per primary CAD- s/p CABG in 2014 w LIMA to LAD, SVG to OM and SVG to PDA- cont current meds Thank you very much for this referral. We appreciate the opportunity to participate in this patient's care. We will follow along with above stated plan. Jude Ramirez PA-C Consulting MD: [place]

## 2021-05-06 NOTE — ROUTINE PROCESS
CHF teaching started post introduction to pt/family/ jail; aware of diagnosis. Planner/scale @ BS and will follow. Smoking/ ETOH/Illicit drug use cessation and maintain a healthy weight covered. Pt/family aware that I can not prescribe nor adjust  medications: 15mins Palliative Care score: on hold Refused ACP on admission Start 2L/D Fluid restriction/ cardiac diet CHF teaching continues to pt/family.  Emphasis on taking prescription meds as ordered, to keep F/U appts and to call MD STAT; 
 
 
AYDEN: [facility]-

## 2021-05-06 NOTE — PROGRESS NOTES
Problem: Falls - Risk of 
Goal: *Absence of Falls Description: Document Chilo Vargas Fall Risk and appropriate interventions in the flowsheet. Outcome: Progressing Towards Goal 
Note: Fall Risk Interventions: 
Mobility Interventions: Bed/chair exit alarm, OT consult for ADLs, Patient to call before getting OOB, PT Consult for mobility concerns, PT Consult for assist device competence, Strengthening exercises (ROM-active/passive) Medication Interventions: Bed/chair exit alarm, Evaluate medications/consider consulting pharmacy, Patient to call before getting OOB, Teach patient to arise slowly Elimination Interventions: Bed/chair exit alarm, Call light in reach, Elevated toilet seat, Patient to call for help with toileting needs, Stay With Me (per policy), Toilet paper/wipes in reach, Toileting schedule/hourly rounds History of Falls Interventions: Bed/chair exit alarm, Room close to nurse's station Problem: Patient Education: Go to Patient Education Activity Goal: Patient/Family Education Outcome: Progressing Towards Goal 
  
Problem: Pressure Injury - Risk of 
Goal: *Prevention of pressure injury Description: Document Ashu Scale and appropriate interventions in the flowsheet. Outcome: Progressing Towards Goal 
Note: Pressure Injury Interventions: 
Sensory Interventions: Assess changes in LOC Moisture Interventions: Absorbent underpads, Check for incontinence Q2 hours and as needed, Internal/External urinary devices, Offer toileting Q_hr Activity Interventions: Increase time out of bed, Pressure redistribution bed/mattress(bed type), PT/OT evaluation Mobility Interventions: PT/OT evaluation, Pressure redistribution bed/mattress (bed type), Turn and reposition approx. every two hours(pillow and wedges), HOB 30 degrees or less Nutrition Interventions: Document food/fluid/supplement intake, Offer support with meals,snacks and hydration Friction and Shear Interventions: HOB 30 degrees or less Problem: Patient Education: Go to Patient Education Activity Goal: Patient/Family Education Outcome: Progressing Towards Goal 
  
Problem: Urinary Tract Infection - Adult Goal: *Absence of infection signs and symptoms Outcome: Progressing Towards Goal 
  
Problem: Patient Education: Go to Patient Education Activity Goal: Patient/Family Education Outcome: Progressing Towards Goal 
  
Problem: Pain Goal: *Control of Pain Outcome: Progressing Towards Goal 
  
Problem: Patient Education: Go to Patient Education Activity Goal: Patient/Family Education Outcome: Progressing Towards Goal

## 2021-05-06 NOTE — PROGRESS NOTES
05/05/21 2130 Dual Skin Pressure Injury Assessment Dual Skin Pressure Injury Assessment WDL Second Care Provider (Based on [address] [place] [address]) [address] [facility] Skin Integumentary Skin Integumentary (WDL) X Pressure  Injury Documentation No Pressure Injury Noted-Pressure Ulcer Prevention Initiated Skin Color Appropriate for ethnicity;Ashwin Skin Condition/Temp Cool Skin Integrity Scars (comment);Cracked (scabbed area to ball of right foot, scab to sacrum area) Turgor Epidermis thin w/ loss of subcut tissue 
(boggy heels) Hair Growth Sparce Nails X Varicosities Absent Exceptions to WDL Thick Wound Prevention and Protection Methods Orientation of Wound Prevention Posterior Location of Wound Prevention Sacrum/Coccyx; Heel Dressing Present  No  
Wound Offloading (Prevention Methods) Pillows Hygiene and Comfort Hygiene Gown changed;Diaper changed;Madeline care; Incontinence care;Linen changed Type of Bath Partial  
Hygiene Assistance Partial  
Comfort Lights dim;Gown changed;Draw sheet changed;Diaper changed;Linen changed;Bed pad changed

## 2021-05-06 NOTE — PROGRESS NOTES
Unique Hospitalist Progress Note Name:  Amber Long  Age:80 y.o. Sex:female :  1940 MRN:  [id number] Admit Date:  2021 Reason for Admission: 
Acute diastolic CHF (congestive heart failure) ([facility] [address] [I50.31] Hospital Course/Interval history:  
 
Pt is a 78 y.o. female with a PMHx of HTN, CAD, T2DM, COPD on 2L NC baseline, dHFpEF, pulmonary HTN who was sent from [address] [facility] due to [facility]. Upon EMS arrival pt was immediately responsive. Of note, she has a 3-month hospital stay for COVID-19 pneumonia with a stormy course requiring hemodialysis and multiple endotracheal intubations. She was eventually discharged to skilled nursing facility and has been readmitted multiple times recently several weeks ago. In ED, CXR shows vascular congestion and edema with a pro-BNP 2639. K 5.3, and pt received lokelma.  and AST of 71 and alk phos 165. Hgb 8 but is chronic. UA consistent with UTI and she was started on Ceftriaxone She is being admitted with acute on chronic diastolic heart failure and acute metabolic encephalopathy. Subjective (21): This a.m. patient was alert and oriented x3 at bedside. Daughter was at bedside. Patient was pleasant and complained of constipation. She was on 2 L nasal cannula. Denies shortness of breath, chest pain, abdominal pain, nausea or vomiting. Review of Systems: 14 point review of systems is otherwise negative with the exception of the elements mentioned above. Assessment & Plan Acute metabolic encephalopathy, resolved CT head on admission shows no acute intracranial abnormalities. Most likely secondary to UTI Avoid sedating meds Delirium precautions PT/OT/ to eval and treat Infection management as stated Obtain Vit B12, folate, ammonia, TSH/Free T4 Alert and oriented x3 this morning Acute on chronic dHFpEF exacerbation CXR on admission shows evidence of pulmonary edema with pro-BNP>2K.   Recent TTE on 4/2021 shows EF 55 to 58% C/W diastolic dysfunction. Low-salt diet, accurate I&O's, daily weights Continue Lasix IV twice daily Changed metoprolol twice daily to ToprolXL Cardiology consulted, appreciate recs--agree with diuresis. On standby On 2LNC at baseline UTI Previous urine cultures grew E. coli Abx: Rocephin (5/5-. ..) UCx 5/5 NGTD BCx 5/5 NGTD Transaminitis  and AST 31 on admission. ? 2/2 Congestive hepatopathy Hold home statin Acute hepatitis panel unremarkable Obtain RUQ ultrasound F/u AM CMP 
 
COPD On 2 L O2 chronically Continue home inhalers Constipation MiraLAX daily and Dulcolax daily for now Hyperkalemia, resolved Macrocytic anemia Chronic at baseline (7.7-9.9) CAD status post CABG Continue home DAPT, BB, losartan. Holding statin d/t elevated LFT's 
 
DMII 
SSI w/ BG check qAC/HS 
 
CKD stage III Creatinine at baseline (1.3-2.0) Monitor renal function while on diuretics Diet:  DIET DIABETIC CONSISTENT CARB 
DVT PPx: SCD's Code status: DNR Disposition/Expected LOS: 1-2 days once able to wean O2 to baseline and UCx/BCx finalized. PT/OT to eval and treat. Objective:  
 
Patient Vitals for the past 24 hrs: 
 Temp Pulse Resp BP SpO2  
05/06/21 1142 97.5 °F (36.4 °C) (!) 59 18 (!) 164/85 100 % 05/06/21 0800  60     
05/06/21 0758 98 °F (36.7 °C) 63 15 (!) 160/98 100 % 05/06/21 0427 97.2 °F (36.2 °C) 60 16 126/67 95 % 05/06/21 0020 97.4 °F (36.3 °C) (!) 54 16 (!) 161/86 100 % 05/05/21 2142 96.9 °F (36.1 °C) 61 16 (!) 187/89 100 % 05/05/21 2051  (!) 45  (!) 178/74 100 % 05/05/21 2002  (!) 46  (!) 164/70   
05/05/21 1952  (!) 42  (!) 164/70 100 % 05/05/21 1822  (!) 49  (!) 180/77 100 % 05/05/21 1751  (!) 41  (!) 177/77 100 % 05/05/21 1722  (!) 50  (!) 151/68 (!) 87 % 05/05/21 1651  (!) 43  (!) 185/80 100 % 05/05/21 1522  (!) 52  (!) 166/74 100 % 05/05/21 1421  (!) 48  (!) 164/71 100 % 05/05/21 1242  (!) 51   100 % 05/05/21 1238  (!) 54  (!) 168/74  Oxygen Therapy O2 Sat (%): 100 % (05/06/21 1142) Pulse via Oximetry: 49 beats per minute (05/05/21 2051) O2 Device: Nasal cannula (05/05/21 1125) O2 Flow Rate (L/min): 2 l/min (05/05/21 1125) Body mass index is 24.11 kg/m². Physical Exam:  
General:  No acute distress, speaking in full sentences, no use of accessory muscles Lungs:  Rales b/l bilaterally CV:  Regular rate and rhythm with normal S1 and S2 Abdomen:  Soft, nontender, nondistended, normoactive bowel sounds Extremities:  No cyanosis clubbing or edema Neuro:   Nonfocal, A&O x3. Follows commands well Psych:  Normal affect Data Review: 
I have reviewed all labs, meds, and studies from the last 24 hours: 
 
Labs: 
 
Recent Results (from the past 24 hour(s)) URINE MICROSCOPIC Collection Time: 05/05/21  2:22 PM  
Result Value Ref Range WBC >100 0 /hpf  
 RBC 0-3 0 /hpf Epithelial cells 0-3 0 /hpf Bacteria 4+ (H) 0 /hpf Casts 0 0 /lpf Crystals, urine 0 0 /LPF Mucus 0 0 /lpf Yeast OCCASIONAL Other observations RESULTS VERIFIED MANUALLY CULTURE, URINE Collection Time: 05/05/21  2:22 PM  
 Specimen: Clean catch; Urine Result Value Ref Range Special Requests: NO SPECIAL REQUESTS Culture result:     
  NO GROWTH AFTER SHORT PERIOD OF INCUBATION. FURTHER RESULTS TO FOLLOW AFTER OVERNIGHT INCUBATION. CULTURE, BLOOD Collection Time: 05/05/21  3:05 PM  
 Specimen: Blood Result Value Ref Range Special Requests: RIGHT FOREARM Culture result: NO GROWTH AFTER 15 HOURS    
GLUCOSE, POC Collection Time: 05/05/21  5:53 PM  
Result Value Ref Range Glucose (POC) 141 (H) 65 - 100 mg/dL Performed by Amor Rosa GLUCOSE, POC Collection Time: 05/05/21  9:41 PM  
Result Value Ref Range Glucose (POC) 164 (H) 65 - 100 mg/dL Performed by Lucila Face METABOLIC PANEL, BASIC  Collection Time: 05/05/21 10:37 PM Result Value Ref Range Sodium 145 136 - 145 mmol/L Potassium 5.2 (H) 3.5 - 5.1 mmol/L Chloride 112 (H) 98 - 107 mmol/L  
 CO2 30 21 - 32 mmol/L Anion gap 3 (L) 7 - 16 mmol/L Glucose 187 (H) 65 - 100 mg/dL BUN 45 (H) 8 - 23 MG/DL Creatinine 1.58 (H) 0.6 - 1.0 MG/DL  
 GFR est AA 40 (L) >60 ml/min/1.73m2 GFR est non-AA 33 (L) >60 ml/min/1.73m2 Calcium 9.1 8.3 - 10.4 MG/DL  
HEPATITIS PANEL, ACUTE Collection Time: 05/05/21 10:38 PM  
Result Value Ref Range Hepatitis A, IgM NONREACTIVE NR Hepatitis B core, IgM NONREACTIVE NR Hep B Surface Ag NONREACTIVE NR Hepatitis C virus Ab NONREACTIVE NR    
METABOLIC PANEL, COMPREHENSIVE Collection Time: 05/06/21  4:53 AM  
Result Value Ref Range Sodium 144 136 - 145 mmol/L Potassium 4.9 3.5 - 5.1 mmol/L Chloride 110 (H) 98 - 107 mmol/L  
 CO2 29 21 - 32 mmol/L Anion gap 5 (L) 7 - 16 mmol/L Glucose 135 (H) 65 - 100 mg/dL BUN 47 (H) 8 - 23 MG/DL Creatinine 1.60 (H) 0.6 - 1.0 MG/DL  
 GFR est AA 40 (L) >60 ml/min/1.73m2 GFR est non-AA 33 (L) >60 ml/min/1.73m2 Calcium 8.9 8.3 - 10.4 MG/DL Bilirubin, total 0.3 0.2 - 1.1 MG/DL  
 ALT (SGPT) 190 (H) 12 - 65 U/L  
 AST (SGOT) 66 (H) 15 - 37 U/L Alk. phosphatase 168 (H) 50 - 136 U/L Protein, total 6.7 6.3 - 8.2 g/dL Albumin 3.0 (L) 3.2 - 4.6 g/dL Globulin 3.7 (H) 2.3 - 3.5 g/dL A-G Ratio 0.8 (L) 1.2 - 3.5 MAGNESIUM Collection Time: 05/06/21  4:53 AM  
Result Value Ref Range Magnesium 2.1 1.8 - 2.4 mg/dL CBC WITH AUTOMATED DIFF Collection Time: 05/06/21  4:53 AM  
Result Value Ref Range WBC 5.1 4.3 - 11.1 K/uL  
 RBC 2.38 (L) 4.05 - 5.2 M/uL HGB 7.7 (L) 11.7 - 15.4 g/dL HCT 24.5 (L) 35.8 - 46.3 % .9 (H) 79.6 - 97.8 FL  
 MCH 32.4 26.1 - 32.9 PG  
 MCHC 31.4 31.4 - 35.0 g/dL  
 RDW 16.5 (H) 11.9 - 14.6 % PLATELET 713 (L) 446 - 450 K/uL MPV 11.1 9.4 - 12.3 FL  ABSOLUTE NRBC 0.00 0.0 - 0.2 K/uL  
 DF AUTOMATED NEUTROPHILS 62 43 - 78 % LYMPHOCYTES 27 13 - 44 % MONOCYTES 7 4.0 - 12.0 % EOSINOPHILS 3 0.5 - 7.8 % BASOPHILS 1 0.0 - 2.0 % IMMATURE GRANULOCYTES 0 0.0 - 5.0 %  
 ABS. NEUTROPHILS 3.2 1.7 - 8.2 K/UL  
 ABS. LYMPHOCYTES 1.4 0.5 - 4.6 K/UL  
 ABS. MONOCYTES 0.4 0.1 - 1.3 K/UL  
 ABS. EOSINOPHILS 0.2 0.0 - 0.8 K/UL  
 ABS. BASOPHILS 0.1 0.0 - 0.2 K/UL  
 ABS. IMM. GRANS. 0.0 0.0 - 0.5 K/UL  
PTT Collection Time: 05/06/21  4:53 AM  
Result Value Ref Range aPTT 29.7 24.1 - 35.1 SEC PROTHROMBIN TIME + INR Collection Time: 05/06/21  4:53 AM  
Result Value Ref Range Prothrombin time 13.2 12.5 - 14.7 sec INR 1.0 CK Collection Time: 05/06/21  4:53 AM  
Result Value Ref Range CK 26 21 - 215 U/L  
GLUCOSE, POC Collection Time: 05/06/21  6:35 AM  
Result Value Ref Range Glucose (POC) 117 (H) 65 - 100 mg/dL Performed by [employer] GLUCOSE, POC Collection Time: 05/06/21 11:38 AM  
Result Value Ref Range Glucose (POC) 263 (H) 65 - 100 mg/dL Performed by [employer] Results Procedure Component Value Units Date/Time CULTURE, URINE [[id number]] Collected: 05/05/21 1422 Order Status: Completed Specimen: Urine from Clean catch Updated: 05/06/21 2988 Special Requests: NO SPECIAL REQUESTS Culture result:    
  NO GROWTH AFTER SHORT PERIOD OF INCUBATION. FURTHER RESULTS TO FOLLOW AFTER OVERNIGHT INCUBATION. CULTURE, BLOOD [[id number]] Collected: 05/05/21 1505 Order Status: Completed Specimen: Blood Updated: 05/06/21 8421 Special Requests: RIGHT FOREARM Culture result: NO GROWTH AFTER 15 HOURS     
 CULTURE, BLOOD [[id number]] Collected: 05/05/21 2237 Order Status: Completed Specimen: Blood Updated: 05/06/21 0019 EKG Results Procedure 720 Value Units Date/Time EKG, 12 LEAD, INITIAL [[id number]] Collected: 05/05/21 1142 Order Status: Completed Updated: 05/05/21 1815 Ventricular Rate 50 BPM   
  Atrial Rate 50 BPM   
  P-R Interval 246 ms   
  QRS Duration 88 ms Q-T Interval 482 ms QTC Calculation (Bezet) 439 ms Calculated P Axis 36 degrees Calculated R Axis 59 degrees Calculated T Axis 109 degrees Diagnosis --  
  Sinus bradycardia with 1st degree A-V block Cannot rule out Anterior infarct (cited on or before 16-APR-2021) T wave abnormality, consider lateral ischemia Abnormal ECG When compared with ECG of 16-APR-2021 12:24, No significant change was found Confirmed by Marleni Rogers ([id number]) on 5/5/2021 5:14:17 PM 
  
  
 
 
Other Studies: 
Ct Head Wo Cont Result Date: 5/5/2021 EXAM: CT HEAD WITHOUT CONTRAST INDICATION: Difficulty awakening, AMS. COMPARISON: Head CT 4/16/2021 TECHNIQUE: Contiguous axial images were obtained from the skull base through the vertex without IV contrast. Radiation dose reduction techniques were used for this study. Our CT scanners use one or all of the following: Automated exposure control, adjustment of the mA and/or kV according to patient size, iterative reconstruction. FINDINGS: Global parenchymal volume loss and ex vacuo ventriculomegaly. Periventricular white matter hypoattenuation reflects sequelae of small vessel ischemic disease. No acute infarction or hemorrhage. Remote right PCA territory infarction. Remote lacunar infarct of the left basal ganglia. No hydrocephalus or midline shift. No extra-axial mass or hemorrhage. The basal cisterns are patent. The visualized portions of the orbits are normal. The mastoid air cells and paranasal sinuses are patent. The visualized vascular structures have an unremarkable noncontrast appearance. The calvarium and soft tissues appear normal.  
 
No acute intracranial abnormality evident by CT. Current Meds:  
Current Facility-Administered Medications Medication Dose Route Frequency  alcohol 62% (NOZIN) nasal  1 Ampule  1 Ampule Topical Q12H  sodium chloride (NS) flush 5-40 mL  5-40 mL IntraVENous Q8H  
 sodium chloride (NS) flush 5-40 mL  5-40 mL IntraVENous PRN  
 acetaminophen (TYLENOL) tablet 650 mg  650 mg Oral Q6H PRN Or  
 acetaminophen (TYLENOL) suppository 650 mg  650 mg Rectal Q6H PRN  polyethylene glycol (MIRALAX) packet 17 g  17 g Oral DAILY PRN  promethazine (PHENERGAN) tablet 12.5 mg  12.5 mg Oral Q6H PRN Or  
 ondansetron (ZOFRAN) injection 4 mg  4 mg IntraVENous Q6H PRN  
 aspirin delayed-release tablet 81 mg  81 mg Oral DAILY  [Held by provider] atorvastatin (LIPITOR) tablet 40 mg  40 mg Oral QHS  clopidogreL (PLAVIX) tablet 75 mg  75 mg Oral DAILY  gabapentin (NEURONTIN) capsule 100 mg  100 mg Oral TID  losartan (COZAAR) tablet 100 mg  100 mg Oral DAILY  furosemide (LASIX) injection 40 mg  40 mg IntraVENous Q12H  
 metoprolol succinate (TOPROL-XL) XL tablet 12.5 mg  12.5 mg Oral DAILY  insulin lispro (HUMALOG) injection   SubCUTAneous AC&HS  cefTRIAXone (ROCEPHIN) 1 g in 0.9% sodium chloride (MBP/ADV) 50 mL MBP  1 g IntraVENous Q24H Problem List: 
Hospital Problems as of 5/6/2021 Date Reviewed: 4/19/2021 Codes Class Noted - Resolved POA Acute diastolic CHF (congestive heart failure) (HCC) ICD-10-CM: I50.31 ICD-9-CM: 428.31, 428.0  5/5/2021 - Present Diastolic CHF, chronic (HCC) ICD-10-CM: I50.32 
ICD-9-CM: 428.32, 428.0  5/5/2021 - Present Pulmonary HTN ([facility] [address] ICD-10-CM: I27.20 ICD-9-CM: 416.8  5/5/2021 - Present Unknown Hyperkalemia ICD-10-CM: E87.5 ICD-9-CM: 276.7  5/5/2021 - Present Unknown UTI (urinary tract infection) ICD-10-CM: N39.0 ICD-9-CM: 599.0  5/5/2021 - Present Unknown Chronic hypoxemic respiratory failure [facility]) ICD-10-CM: J96.11 
ICD-9-CM: 518.83, 799.02  5/5/2021 - Present Unknown Hypercapnemia ICD-10-CM: R06.89 
ICD-9-CM: 786.09  5/5/2021 - Present Unknown Abnormal LFTs ICD-10-CM: R94.5 ICD-9-CM: 790.6 5/5/2021 - Present Unknown * (Principal) Acute hypoxemic respiratory failure ([facility] [address] ICD-10-CM: J96.01 
ICD-9-CM: 518.81  12/24/2020 - Present Yes Metabolic encephalopathy CARLO-36-RY: G93.41 
ICD-9-CM: 348.31  1/30/2019 - Present Unknown Uncontrolled type 2 diabetes mellitus with hyperglycemia (HCC) (Chronic) ICD-10-CM: E11.65 ICD-9-CM: 250.02  6/15/2015 - Present Yes Essential hypertension (Chronic) ICD-10-CM: I10 
ICD-9-CM: 401.9  6/15/2015 - Present Yes HLD (hyperlipidemia) (Chronic) ICD-10-CM: A89.8 ICD-9-CM: 272.4  6/15/2015 - Present Yes Chronic kidney disease, stage III (moderate) (HCC) (Chronic) ICD-10-CM: N18.30 ICD-9-CM: 585.3  6/15/2015 - Present Yes Part of this note was written by using a voice dictation software and the note has been proof read but may still contain some grammatical/other typographical errors. Signed By: DO Krzysztof Wood [place] [facility]  May 6, 2021  5:15 PM

## 2021-05-07 NOTE — PROGRESS NOTES
Per tele pt kevyn down to 49. Vitals are p: 59, bp 154/78, o2 100%, 97.7, R: 16 
hosptialist notified, no new orders received

## 2021-05-07 NOTE — PROGRESS NOTES
Therapy evals complete. [facility]. Pt most likely will not receive insurance approval today. Facility does not accept weekend admissions so pt hopefully will dc to rehab on Monday.

## 2021-05-07 NOTE — PROGRESS NOTES
Problem: Falls - Risk of 
Goal: *Absence of Falls Description: Document Saundra Johnson Fall Risk and appropriate interventions in the flowsheet. Outcome: Progressing Towards Goal 
Note: Fall Risk Interventions: 
Mobility Interventions: Bed/chair exit alarm, Communicate number of staff needed for ambulation/transfer, Patient to call before getting OOB, PT Consult for mobility concerns, PT Consult for assist device competence Medication Interventions: Bed/chair exit alarm, Evaluate medications/consider consulting pharmacy, Patient to call before getting OOB, Teach patient to arise slowly Elimination Interventions: Bed/chair exit alarm, Call light in reach, Patient to call for help with toileting needs History of Falls Interventions: Bed/chair exit alarm, Door open when patient unattended Problem: Patient Education: Go to Patient Education Activity Goal: Patient/Family Education Outcome: Progressing Towards Goal 
  
Problem: Pressure Injury - Risk of 
Goal: *Prevention of pressure injury Description: Document Ashu Scale and appropriate interventions in the flowsheet. Outcome: Progressing Towards Goal 
Note: Pressure Injury Interventions: 
Sensory Interventions: Assess changes in LOC, Minimize linen layers, Pressure redistribution bed/mattress (bed type) Moisture Interventions: Absorbent underpads, Check for incontinence Q2 hours and as needed, Internal/External urinary devices Activity Interventions: Increase time out of bed, Pressure redistribution bed/mattress(bed type), PT/OT evaluation Mobility Interventions: PT/OT evaluation, Pressure redistribution bed/mattress (bed type), HOB 30 degrees or less Nutrition Interventions: Document food/fluid/supplement intake Friction and Shear Interventions: HOB 30 degrees or less, Minimize layers Problem: Patient Education: Go to Patient Education Activity Goal: Patient/Family Education Outcome: Progressing Towards Goal 
 Problem: Urinary Tract Infection - Adult Goal: *Absence of infection signs and symptoms Outcome: Progressing Towards Goal 
  
Problem: Patient Education: Go to Patient Education Activity Goal: Patient/Family Education Outcome: Progressing Towards Goal 
  
Problem: Pain Goal: *Control of Pain Outcome: Progressing Towards Goal 
  
Problem: Patient Education: Go to Patient Education Activity Goal: Patient/Family Education Outcome: Progressing Towards Goal

## 2021-05-07 NOTE — PROGRESS NOTES
ACUTE OT GOALS: 
(Developed with and agreed upon by patient and/or caregiver.) 1. Patient will complete lower body bathing and dressing with set up and adaptive equipment as needed. 2. Patient will complete toileting with minimal assistance x1.  
3. Patient will complete grooming ADL with set up. 4. Patient will tolerate 23 minutes of OT treatment with 1-2 rest breaks to increase activity tolerance for ADLs. 5. Patient will complete functional transfers with minimal assistance x1 and adaptive equipment as needed. 6. Patient will tolerate 10 minutes BUE exercises to increase strength for safe, functional transfers. Timeframe: 7 visits OCCUPATIONAL THERAPY ASSESSMENT: Initial Assessment and Daily Note OT Treatment Day # 1 Vee Kern is a 78 y.o. female PRIMARY DIAGNOSIS: Acute hypoxemic respiratory failure ([facility] [address] Acute diastolic CHF (congestive heart failure) ([facility] [address] [I50.31] Reason for Referral: ICD-10: Treatment Diagnosis: Generalized Muscle Weakness (M62.81) Other lack of cordination (R27.8) Difficulty in walking, Not elsewhere classified (R26.2) INPATIENT: Payor: [facility] MEDICARE / Plan: [employer] [address] / Product Type: [employer] Medicare /  
ASSESSMENT:  
 
REHAB RECOMMENDATIONS:  
Recommendation to date pending progress: 
Setting:  Short-term Rehab Equipment:  To Be Determined PRIOR LEVEL OF FUNCTION: 
(Prior to Hospitalization)  INITIAL/CURRENT LEVEL OF FUNCTION: 
(Based on today's evaluation) Bathing:  Unknown Dressing:  Unknown Feeding/Grooming:  Unknown Toileting:  Unknown Functional Mobility: 
 Unknown Bathing:  Moderate Assistance Dressing:  Minimal Assistance Feeding/Grooming:  Minimal Assistance Toileting:  Moderate Assistance x 2 Functional Mobility:  Moderate Assistance x 2  
 
ASSESSMENT: 
Ms. Lauren Reyes is a 77 y/o female admitted from rehab with acute respiratory failure with hypoxia.  Pt unable to provide PLOF from STR, but was able to state that she was moving around fine at home before rehab. Pt on 5L O2 NC today and reported not wearing home O2. Pt with decreased functional ADLs, mobility and strength. Pt mod A x2 for mobility and transfers and min A for functional ADLs today secondary to generalized weakness. Pt is currently functioning below baseline and would benefit from skilled OT services to address deficits and goals. Rec back to STR at d/c.   
 
SUBJECTIVE:  
Ms. Alfreda Little states, \"I'm not sure if I used a walker at rehab. \" SOCIAL HISTORY/LIVING ENVIRONMENT: admit from rehab, unsure PLOF, pt not a good historian OBJECTIVE:  
 
PAIN: VITAL SIGNS: LINES/DRAINS:  
Pre Treatment: Pain Screen Pain Scale 1: Numeric (0 - 10) Pain Intensity 1: 0 Post Treatment: 0   none O2 Device: Nasal cannula GROSS EVALUATION: 
BUE Within Functional Limits Abnormal/ Functional Abnormal/ Non-Functional (see comments) Not Tested Comments: AROM [] [] [x] [] Unable to reach shoulder flexion to 90 degrees PROM [x] [] [] [] Strength [] [x] [] [] Grossly 3+/5 strength Balance [] [] [x] [] Assist of 2 Posture [] [x] [] [] Forward leaning in standing with mod A x2 RW Sensation [x] [] [] [] Coordination [] [] [x] [] Difficulty understanding prompts to complete formal testing Tone [x] [] [] [] Edema [x] [] [] [] Activity Tolerance [] [] [x] [] 5L O2 NC  
 [] [] [] [] COGNITION/ 
PERCEPTION: Intact Impaired  
(see comments) Comments:  
Orientation [x] [] AAO x3 Vision [x] [] Hearing [x] [] Judgment/ Insight [] [x] Attention [x] [] Memory [] [x] Secondary to slight confusion, unable to give PLOF Command Following [x] [] Emotional Regulation [] []   
 [] [] ACTIVITIES OF DAILY LIVING: I Mod I S SBA CGA Min Mod Max Total NT Comments BASIC ADLs:             
Bathing/ Showering [] [] [] [] [] [] [] [] [] [] Toileting [] [] [] [] [] [] [] [] [] [] Dressing [] [] [] [] [] [x] [] [] [] [] Pulling up socks sitting EOB Feeding [] [] [x] [] [] [] [] [] [] [] Taking pills/drinking water with RN present Grooming [] [] [] [] [] [x] [] [] [] [] Brushing teeth and washing face in chair, difficulty squeezing toothpaste tube and overshooting/undershooting toothbrush when putting toothpaste on Personal Device Care [] [] [] [] [] [] [] [] [] [] Functional Mobility [] [] [] [] [] [] [x] [] [] [] x2 RW  
I=Independent, Mod I=Modified Independent, S=Supervision, SBA=Standby Assistance, CGA=Contact Guard Assistance,  
Min=Minimal Assistance, Mod=Moderate Assistance, Max=Maximal Assistance, Total=Total Assistance, NT=Not Tested MOBILITY: I Mod I S SBA CGA Min Mod Max Total  NT x2 Comments:  
Supine to sit [] [] [] [] [] [x] [] [] [] [] [x] Sit to supine [] [] [] [] [] [] [] [] [] [] [] Sit to stand [] [] [] [] [] [] [x] [] [] [] [x] RW Bed to chair [] [] [] [] [] [] [x] [] [] [] [x] RW  
I=Independent, Mod I=Modified Independent, S=Supervision, SBA=Standby Assistance, CGA=Contact Guard Assistance,  
Min=Minimal Assistance, Mod=Moderate Assistance, Max=Maximal Assistance, Total=Total Assistance, NT=Not Tested [facility] Daily Activity Inpatient Short Form How much help from another person does the patient currently need. .. Total A Lot A Little None 1. Putting on and taking off regular lower body clothing? [] 1   [] 2   [x] 3   [] 4  
2. Bathing (including washing, rinsing, drying)? [] 1   [x] 2   [] 3   [] 4  
3. Toileting, which includes using toilet, bedpan or urinal?   [] 1   [] 2   [x] 3   [] 4  
4. Putting on and taking off regular upper body clothing? [] 1   [] 2   [x] 3   [] 4  
5. Taking care of personal grooming such as brushing teeth? [] 1   [] 2   [x] 3   [] 4  
6. Eating meals? [] 1   [] 2   [x] 3   [] 4  
© 2007, Trustees of [address] [place] [address] [address] [id number], under license to [facility].  All rights reserved Score:  Initial: 17 Most Recent: X (Date: -- ) Interpretation of Tool:  Represents activities that are increasingly more difficult (i.e. Bed mobility, Transfers, Gait). PLAN:  
FREQUENCY/DURATION: OT Plan of Care: 3 times/week for duration of hospital stay or until stated goals are met, whichever comes first. 
 
PROBLEM LIST:  
(Skilled intervention is medically necessary to address:) 1. Decreased ADL/Functional Activities 2. Decreased Activity Tolerance 3. Decreased AROM/PROM 4. Decreased Balance 5. Decreased Cognition 6. Decreased Coordination 7. Decreased Gait Ability 8. Decreased Strength 9. Decreased Transfer Abilities INTERVENTIONS PLANNED:  
(Benefits and precautions of occupational therapy have been discussed with the patient.) 1. Self Care Training 2. Therapeutic Activity 3. Therapeutic Exercise/HEP 4. Neuromuscular Re-education 5. Education TREATMENT:  
 
EVALUATION: Low Complexity : (Untimed Charge) TREATMENT:  
($$ Self Care/Home Management: 8-22 mins    ) Self Care (18 Minutes): Self care including Lower Body Dressing, Grooming, ADL Adaptive Equipment Training and functional transfers in prep for ADLs to increase independence and decrease level of assistance required. TREATMENT GRID: 
N/A 
 
AFTER TREATMENT POSITION/PRECAUTIONS: 
Alarm Activated, Chair, Needs within reach and RN notified INTERDISCIPLINARY COLLABORATION: 
RN/PCT, PT/PTA and OT/PERALTA TOTAL TREATMENT DURATION: 
OT Patient Time In/Time Out Time In: 5003 Time Out: 1105 Jose Percdominique, OT

## 2021-05-07 NOTE — PROGRESS NOTES
Occupational Therapy Note:  
 
OT orders received, chart reviewed, and evaluation attempted. Patient unable to be seen due to off the floor at ultrasound. Will plan to check back as schedule permits and patient is available to complete occupational therapy evaluation. Thank you, Ximena Florez, OTR/L

## 2021-05-07 NOTE — PROGRESS NOTES
Problem: Falls - Risk of 
Goal: *Absence of Falls Description: Document Lady Montoya Fall Risk and appropriate interventions in the flowsheet. Outcome: Progressing Towards Goal 
Note: Fall Risk Interventions: 
Mobility Interventions: Bed/chair exit alarm, Communicate number of staff needed for ambulation/transfer, Patient to call before getting OOB, PT Consult for mobility concerns, PT Consult for assist device competence Medication Interventions: Bed/chair exit alarm Elimination Interventions: Call light in reach History of Falls Interventions: Bed/chair exit alarm Problem: Patient Education: Go to Patient Education Activity Goal: Patient/Family Education Outcome: Progressing Towards Goal 
  
Problem: Pressure Injury - Risk of 
Goal: *Prevention of pressure injury Description: Document Ashu Scale and appropriate interventions in the flowsheet. Outcome: Progressing Towards Goal 
Note: Pressure Injury Interventions: 
Sensory Interventions: Assess changes in LOC Moisture Interventions: Absorbent underpads Activity Interventions: Increase time out of bed Mobility Interventions: PT/OT evaluation Nutrition Interventions: Document food/fluid/supplement intake Friction and Shear Interventions: HOB 30 degrees or less, Minimize layers Problem: Patient Education: Go to Patient Education Activity Goal: Patient/Family Education Outcome: Progressing Towards Goal 
  
Problem: Urinary Tract Infection - Adult Goal: *Absence of infection signs and symptoms Outcome: Progressing Towards Goal 
  
Problem: Patient Education: Go to Patient Education Activity Goal: Patient/Family Education Outcome: Progressing Towards Goal 
  
Problem: Pain Goal: *Control of Pain Outcome: Progressing Towards Goal 
  
Problem: Patient Education: Go to Patient Education Activity Goal: Patient/Family Education Outcome: Progressing Towards Goal

## 2021-05-07 NOTE — PROGRESS NOTES
Pt had bright red bloody stool. Hospitalist notified and lab currently drawing labs. No new orders received

## 2021-05-07 NOTE — PROGRESS NOTES
ACUTE PHYSICAL THERAPY GOALS: 
(Developed with and agreed upon by patient and/or caregiver.) ST. Patient will perform bed mobility with STAND BY ASSISTANCE within 3 days. 2. Patient will transfer bed to chair with MODERATE ASSISTANCE x1 within 3 days. 3. Patient will demonstrate GOOD DYNAMIC UNSUPPORTED SITTING balance within 3 day(s). 4. Patient will tolerate 15+ minutes of therapeutic activity/exercise and/or neuromuscular re-education while maintaining stable vitals to improve functional strength and activity tolerance within 3 days. LT. Patient will perform bed mobility with MODIFIED INDEPENDENCE within 7 days. 2. Patient will transfer bed to chair with MINIMAL ASSISTANCE within 7 days. 3. Patient will demonstrate FAIR STATIC STANDING balance within 7 day(s). 4. Patient will ambulate 25ft + using least restrictive assistive device and MODERATE ASSISTANCE within 7 days. 5. Patient will tolerate 25+ minutes of therapeutic activity/exercise and/or neuromuscular re-education while maintaining stable vitals to improve functional strength and activity tolerance within 7 days. PHYSICAL THERAPY ASSESSMENT: Initial Assessment, Daily Note and AM PT Treatment Day # 1 Brandon Rincon is a 78 y.o. female PRIMARY DIAGNOSIS: Acute hypoxemic respiratory failure ([facility] [address] Acute diastolic CHF (congestive heart failure) ([facility] [address] [I50.31] Reason for Referral: ICD-10: Treatment Diagnosis: Generalized Muscle Weakness (M62.81) Difficulty in walking, Not elsewhere classified (R26.2) INPATIENT: Payor: [employer] MEDICARE / Plan: [address] / Product Type: [employer] Medicare /  
 
ASSESSMENT:  
 
REHAB RECOMMENDATIONS:  
Recommendation to date pending progress: 
Setting:  Short-term Rehab Equipment:  To Be Determined PRIOR LEVEL OF FUNCTION: 
(Prior to Hospitalization) INITIAL/CURRENT LEVEL OF FUNCTION: 
(Most Recently Demonstrated) Bed Mobility: 
 Unknown Sit to Stand: 
 Unknown Transfers:  Unknown 
Gait/Mobility: 
 Unknown Bed Mobility:  Minimal Assistance Sit to Stand:  Moderate Assistance x 2 Transfers:  Moderate Assistance x 2 Gait/Mobility:  Moderate Assistance x 2  
 
ASSESSMENT: 
Ms. Yfn Lacey is a pleasant 78year old female with history significant for prolonged hospital course for COVID 19, readmitted from [facility] with metabolic encephalopathy. Patient seen this AM for initial PT evaluation. Patient is oriented to person and place but a poor historian regarding PLOF at [address]. Today, presents with decreased functional strength, decreased B LE AROM, and decreased functional activity tolerance. Required minimal assistance with bed mobility and moderate assistance x2 for 4steps to chair. Recommend return to [facility] at discharge to continue to address stated deficits. Will follow with stated plan of care during acute stay. SUBJECTIVE:  
[facility] \" SOCIAL HISTORY/LIVING ENVIRONMENT: History significant for prolonged hospital course for COVID 19, readmitted from [facility] with metabolic encephalopathy. Patient seen this AM 
 for initial PT evaluation. Patient is oriented to person and place but a poor historian regarding PLOF at [address]. OBJECTIVE:  
 
PAIN: VITAL SIGNS: LINES/DRAINS:  
Pre Treatment: Pain Screen Pain Scale 1: FLACC Pain Intensity 1: 2 Pain Onset 1: acute on chronic Pain Location 1: Leg 
Pain Orientation 1: Right;Left;Posterior Pain Description 1: Sore Pain Intervention(s) 1: Ambulation/Increased Activity; Position;Repositioned; Rest 
Post Treatment: Endorses comfort in bedside chair; FLACC 0   IV 
O2 Device: Nasal cannula GROSS EVALUATION: 
B LE Within Functional Limits Abnormal/ Functional Abnormal/ Non-Functional (see comments) Not Tested Comments: AROM [] [x] [] [] Decreased hamstring and hip flexor extension. Mild deficits in dorsiflexion AROM as well.   
PROM [] [] [] [x] Strength [] [x] [] [] Generalized weakness 3+/5 B LE Balance [] [x] [] [] Fair- to poor dynamically in standing Posture [] [x] [] [] Trunk flexion, forward head Sensation [x] [] [] [] Intact light touch B LEs Coordination [] [] [] [x] Tone [] [] [] [x] Edema [] [] [] [x] Activity Tolerance [] [x] [] [] Requiring 5L 02  
 [] [] [] [] COGNITION/ 
PERCEPTION: Intact Impaired  
(see comments) Comments:  
Orientation [] [x] Oriented to person/place; disoriented to PLOF Vision [] [] Not formally assessed Hearing [] [] Not formally assessed Command Following [x] [] Safety Awareness [] [x] Required safety cues; decreased secondary to cognition   
 [] [] MOBILITY: I Mod I S SBA CGA Min Mod Max Total  NT x2 Comments:  
Bed Mobility Rolling [] [] [] [] [] [] [] [] [] [x] [] Supine to Sit [] [] [] [] [x] [x] [] [] [] [] [] Scooting [] [] [] [] [] [x] [] [] [] [] [] Sit to Supine [] [] [] [] [] [] [] [] [] [x] [] Transfers Sit to Stand [] [] [] [] [] [x] [x] [] [] [] [x] Bed to Chair [] [] [] [] [] [] [x] [] [] [] [x] Stand to Sit [] [] [] [] [] [] [x] [] [] [] [x] I=Independent, Mod I=Modified Independent, S=Supervision, SBA=Standby Assistance, CGA=Contact Guard Assistance,  
Min=Minimal Assistance, Mod=Moderate Assistance, Max=Maximal Assistance, Total=Total Assistance, NT=Not Tested GAIT: I Mod I S SBA CGA Min Mod Max Total  NT x2 Comments:  
Level of Assistance [] [] [] [] [] [] [x] [] [] [] [x] Distance 4ft DME Rolling Walker and [employer] Gait Quality Narrowed base of support, decreased step length and step clearance bilaterally; poor dynamic standing balance Weightbearing Status N/A I=Independent, Mod I=Modified Independent, S=Supervision, SBA=Standby Assistance, CGA=Contact Guard Assistance,  
Min=Minimal Assistance, Mod=Moderate Assistance, Max=Maximal Assistance, Total=Total Assistance, NT=Not Tested Pool Basic Mobility Inpatient Short Form How much difficulty does the patient currently have. .. Unable A Lot A Little None 1. Turning over in bed (including adjusting bedclothes, sheets and blankets)? [] 1   [] 2   [x] 3   [] 4  
2. Sitting down on and standing up from a chair with arms ( e.g., wheelchair, bedside commode, etc.)   [] 1   [x] 2   [] 3   [] 4  
3. Moving from lying on back to sitting on the side of the bed? [] 1   [] 2   [x] 3   [] 4 How much help from another person does the patient currently need. .. Total A Lot A Little None 4. Moving to and from a bed to a chair (including a wheelchair)? [] 1   [x] 2   [] 3   [] 4  
5. Need to walk in hospital room? [] 1   [x] 2   [] 3   [] 4  
6. Climbing 3-5 steps with a railing? [x] 1   [] 2   [] 3   [] 4  
© , Trustees of [address] [place] [address], under license to [employer]. All rights reserved Score:  Initial: 13 Most Recent: X (Date: -- ) Interpretation of Tool:  Represents activities that are increasingly more difficult (i.e. Bed mobility, Transfers, Gait). PLAN:  
FREQUENCY/DURATION: PT Plan of Care: 3 times/week for duration of hospital stay or until stated goals are met, whichever comes first. 
 
PROBLEM LIST:  
(Skilled intervention is medically necessary to address:) 1. Decreased ADL/Functional Activities 2. Decreased Activity Tolerance 3. Decreased AROM/PROM 4. Decreased Balance 5. Decreased Cognition 6. Decreased Gait Ability 7. Decreased Strength 8. Decreased Transfer Abilities 9. Increased Pain INTERVENTIONS PLANNED:  
(Benefits and precautions of physical therapy have been discussed with the patient.) 1. Therapeutic Activity 2. Therapeutic Exercise/HEP 3. Neuromuscular Re-education 4. Gait Training 5. Manual Therapy 6. Education TREATMENT:  
 
EVALUATION: Low Complexity : (Untimed Charge) TREATMENT:  
($$ Gait Trainin-22 mins    ) Gait Training (8 Minutes): Gait training for 4 feet utilizing Rolling Walker and [employer]. Patient required Manual, Tactile, Verbal and Visual cueing to improve Activity Pacing, Assistive Device Utilization, Dynamic Standing Balance, Gait Mechanics and pre gait stance and weight shifts throughout as well as trunk and posture. TREATMENT GRID: 
N/A 
 
AFTER TREATMENT POSITION/PRECAUTIONS: 
Alarm Activated, Chair, Needs within reach, RN notified and OT at bedside INTERDISCIPLINARY COLLABORATION: 
RN/PCT, PT/PTA and OT/PERALTA TOTAL TREATMENT DURATION: 
PT Patient Time In/Time Out Time In: 1896 Time Out: 1058 Gita Pardo DPT

## 2021-05-07 NOTE — PROGRESS NOTES
Unique Hospitalist Progress Note Name:  Darlin Chun  Age:80 y.o. Sex:female :  1940 MRN:  [id number] Admit Date:  2021 Reason for Admission: 
Acute diastolic CHF (congestive heart failure) ([facility] [address] [facility] [I50.31] Hospital Course/Interval history:  
 
Pt is a 78 y.o. female with a PMHx of HTN, CAD, T2DM, COPD on 2L NC baseline, dHFpEF, pulmonary HTN who was sent from [facility] due to [facility]. Upon EMS arrival pt was immediately responsive. Of note, she has a 3-month hospital stay for COVID-19 pneumonia with a stormy course requiring hemodialysis and multiple endotracheal intubations. She was eventually discharged to skilled nursing facility and has been readmitted multiple times recently several weeks ago. In ED, CXR shows vascular congestion and edema with a pro-BNP 2639. K 5.3, and pt received lokelma.  and AST of 71 and alk phos 165. Hgb 8 but is chronic. UA consistent with UTI and she was started on Ceftriaxone She is being admitted with acute on chronic diastolic heart failure and acute metabolic encephalopathy. Subjective (21): This a.m. patient was alert and oriented x3 at bedside. Daughter was at bedside. Patient was pleasant and complained of constipation. She was on 2 L nasal cannula. Denies shortness of breath, chest pain, abdominal pain, nausea or vomiting. Review of Systems: 14 point review of systems is otherwise negative with the exception of the elements mentioned above. Assessment & Plan Acute metabolic encephalopathy, resolved CT head on admission shows no acute intracranial abnormalities. Most likely secondary to UTI Avoid sedating meds Delirium precautions PT/OT/ to eval and treat Infection management as stated Vit B12, folate, ammonia--wnl. TSH 4.0 but Free T4 0.9 at low normal. 
Alert and oriented x4 Acute on chronic dHFpEF exacerbation CXR on admission shows evidence of pulmonary edema with pro-BNP>2K. Recent TTE on 4/2021 shows EF 55 to 59% C/W diastolic dysfunction. Low-salt diet, accurate I&O's, daily weights Change Lasix IV to po d/t slight increase in Cr Changed metoprolol twice daily to ToprolXL Cardiology consulted, appreciate recs--agree with diuresis. On standby On 5LNC, wean as tolerated CXR in AM 
 
UTI Previous urine cultures grew E. coli Abx: Rocephin (5/5-. ..) UCx 5/5 >100K GNR, pending S/S 
BCx 5/5 NGTD Transaminitis  and AST 31 on admission. Hold home statin Acute hepatitis panel unremarkable RUQ ultrasound showing dilated CBD could be d/t biliary ectasia vs obstructing mass or stricture. Recommends MRCP or ERCP. Will consult GI, appreciate recs F/u AM CMP 
 
COPD On 2 L O2 chronically at baseline Continue home inhalers Constipation MiraLAX daily and Dulcolax daily prn for now Hyperkalemia Add Lokelma TID for now Recently received 6U of SSI as well. Will recheck K in 3 h Holding Losartan. Cont Lasix Macrocytic anemia Chronic at baseline (7.7-9.9) CAD status post CABG Continue home DAPT, BB, losartan. Holding statin d/t elevated LFT's 
 
DMII 
SSI w/ BG check qAC/HS 
 
CKD stage III Creatinine at baseline (1.3-2.0) Monitor renal function while on diuretics Diet:  DIET DIABETIC CONSISTENT CARB 
DVT PPx: SCD's Code status: DNR Disposition/Expected LOS: Hopefully Monday 5/10 as CM is working with [facility]. Pending final UCX and change of abx. Also GI recs and weaning pt's O2. Objective:  
 
Patient Vitals for the past 24 hrs: 
 Temp Pulse Resp BP SpO2  
05/07/21 1514 97.8 °F (36.6 °C) (!) 53 17 139/66 100 % 05/07/21 1116 97.7 °F (36.5 °C) 76 18 (!) 158/68 94 % 05/07/21 0824 97.5 °F (36.4 °C) 62 17 (!) 149/70 100 % 05/07/21 0346 96.8 °F (36 °C) (!) 56 16 128/63 97 % 05/07/21 0333 97.7 °F (36.5 °C) (!) 59 16 (!) 154/78 100 % 05/07/21 0021 97 °F (36.1 °C) 70 16 (!) 150/76 100 % 05/06/21 1947 97.6 °F (36.4 °C) 60 16 (!) 146/67 100 % 05/06/21 1600  (!) 59     
05/06/21 1541 97.6 °F (36.4 °C) (!) 59 17 (!) 148/76 90 % Oxygen Therapy O2 Sat (%): 100 % (05/07/21 1514) Pulse via Oximetry: 49 beats per minute (05/05/21 2051) O2 Device: Nasal cannula (05/05/21 1125) O2 Flow Rate (L/min): 2 l/min (05/05/21 1125) Body mass index is 22.63 kg/m². Physical Exam:  
General:  No acute distress, speaking in full sentences, no use of accessory muscles. Pleasant Lungs:  Decrease lung sounds b/l but improve in coarseness CV:  Regular rate and rhythm with normal S1 and S2 Abdomen:  Soft, nontender, nondistended, normoactive bowel sounds Extremities:  No cyanosis clubbing or edema Neuro:   Nonfocal, A&O x3. Follows commands well Psych:  Normal affect Data Review: 
I have reviewed all labs, meds, and studies from the last 24 hours: 
 
Labs: 
 
Recent Results (from the past 24 hour(s)) GLUCOSE, POC Collection Time: 05/06/21  3:47 PM  
Result Value Ref Range Glucose (POC) 134 (H) 65 - 100 mg/dL Performed by Diego   
HGB & HCT Collection Time: 05/06/21  8:03 PM  
Result Value Ref Range HGB 7.9 (L) 11.7 - 15.4 g/dL HCT 25.0 (L) 35.8 - 46.3 % GLUCOSE, POC Collection Time: 05/06/21  9:13 PM  
Result Value Ref Range Glucose (POC) 156 (H) 65 - 100 mg/dL Performed by Sarahi Bustamante, POC Collection Time: 05/06/21 10:05 PM  
Result Value Ref Range Glucose (POC) 174 (H) 65 - 100 mg/dL Performed by Perez ([place])   
VITAMIN B12 Collection Time: 05/07/21  4:34 AM  
Result Value Ref Range Vitamin B12 925 193 - 986 pg/mL FOLATE Collection Time: 05/07/21  4:34 AM  
Result Value Ref Range Folate 26.8 (H) 3.1 - 17.5 ng/mL TSH 3RD GENERATION Collection Time: 05/07/21  4:34 AM  
Result Value Ref Range TSH 4.080 (H) 0.358 - 3.740 uIU/mL T4, FREE Collection Time: 05/07/21  4:34 AM  
Result Value Ref Range T4, Free 0.9 0.9 - 1.8 NG/DL  
AMMONIA  Collection Time: 05/07/21  4:34 AM  
Result Value Ref Range Ammonia 19 11 - 32 UMOL/L  
CBC W/O DIFF Collection Time: 05/07/21  4:34 AM  
Result Value Ref Range WBC 6.2 4.3 - 11.1 K/uL  
 RBC 2.42 (L) 4.05 - 5.2 M/uL HGB 7.8 (L) 11.7 - 15.4 g/dL HCT 25.1 (L) 35.8 - 46.3 % .7 (H) 79.6 - 97.8 FL  
 MCH 32.2 26.1 - 32.9 PG  
 MCHC 31.1 (L) 31.4 - 35.0 g/dL  
 RDW 16.1 (H) 11.9 - 14.6 % PLATELET 853 (L) 080 - 450 K/uL MPV 11.2 9.4 - 12.3 FL ABSOLUTE NRBC 0.00 0.0 - 0.2 K/uL METABOLIC PANEL, COMPREHENSIVE Collection Time: 05/07/21  4:34 AM  
Result Value Ref Range Sodium 144 136 - 145 mmol/L Potassium 5.2 (H) 3.5 - 5.1 mmol/L Chloride 111 (H) 98 - 107 mmol/L  
 CO2 30 21 - 32 mmol/L Anion gap 3 (L) 7 - 16 mmol/L Glucose 117 (H) 65 - 100 mg/dL BUN 49 (H) 8 - 23 MG/DL Creatinine 1.61 (H) 0.6 - 1.0 MG/DL  
 GFR est AA 39 (L) >60 ml/min/1.73m2 GFR est non-AA 33 (L) >60 ml/min/1.73m2 Calcium 9.1 8.3 - 10.4 MG/DL Bilirubin, total 0.2 0.2 - 1.1 MG/DL  
 ALT (SGPT) 141 (H) 12 - 65 U/L  
 AST (SGOT) 38 (H) 15 - 37 U/L Alk. phosphatase 157 (H) 50 - 136 U/L Protein, total 6.8 6.3 - 8.2 g/dL Albumin 2.9 (L) 3.2 - 4.6 g/dL Globulin 3.9 (H) 2.3 - 3.5 g/dL A-G Ratio 0.7 (L) 1.2 - 3.5 GLUCOSE, POC Collection Time: 05/07/21  6:27 AM  
Result Value Ref Range Glucose (POC) 121 (H) 65 - 100 mg/dL Performed by Abilio Flores POC Collection Time: 05/07/21 11:16 AM  
Result Value Ref Range Glucose (POC) 283 (H) 65 - 100 mg/dL Performed by [employer] POTASSIUM Collection Time: 05/07/21 12:14 PM  
Result Value Ref Range Potassium 6.0 (H) 3.5 - 5.1 mmol/L All Micro Results Procedure Component Value Units Date/Time CULTURE, URINE [[id number]]  (Abnormal) Collected: 05/05/21 1422 Order Status: Completed Specimen: Urine from Clean catch Updated: 05/07/21 0720 Special Requests: NO SPECIAL REQUESTS   Culture result:    
  >100,000 COLONIES/mL GRAM NEGATIVE RODS IDENTIFICATION AND SUSCEPTIBILITY TO FOLLOW  
     
      
  <10,000 COLONIES/mL MIXED SKIN REDD ISOLATED  
     
 CULTURE, BLOOD [[id number]] Collected: 05/05/21 1505 Order Status: Completed Specimen: Blood Updated: 05/07/21 6689 Special Requests: RIGHT FOREARM Culture result: NO GROWTH 2 DAYS     
 CULTURE, BLOOD [[id number]] Collected: 05/05/21 2237 Order Status: Completed Specimen: Blood Updated: 05/07/21 6570 Special Requests: --     
  RIGHT Antecubital 
  
  Culture result: NO GROWTH 1 DAY EKG Results Procedure 720 Value Units Date/Time EKG, 12 LEAD, INITIAL [[id number]] Collected: 05/05/21 1142 Order Status: Completed Updated: 05/05/21 1714 Ventricular Rate 50 BPM   
  Atrial Rate 50 BPM   
  P-R Interval 246 ms   
  QRS Duration 88 ms Q-T Interval 482 ms QTC Calculation (Bezet) 439 ms Calculated P Axis 36 degrees Calculated R Axis 59 degrees Calculated T Axis 109 degrees Diagnosis --  
  Sinus bradycardia with 1st degree A-V block Cannot rule out Anterior infarct (cited on or before 16-APR-2021) T wave abnormality, consider lateral ischemia Abnormal ECG When compared with ECG of 16-APR-2021 12:24, No significant change was found Confirmed by Tamra Caba ([id number]) on 5/5/2021 5:14:17 PM 
  
  
 
 
Other Studies: 
[facility] Result [facility]: 5/7/2021 Right upper quadrant ultrasound HISTORY: Transaminitis. Assess for cirrhosis or cholelithiasis Real-time sonography of the right upper quadrant was performed. Comparison: None. Correlation is made to the noncontrast CT scan of the abdomen and pelvis 08/08/1990. FINDINGS: Hepatic echotexture is felt to be at the upper limits of normal. There is no intrahepatic biliary ductal dilatation. There is no focal hepatic mass. The gallbladder is absent. The common bile duct is dilated measuring 14 mm.  The pancreatic duct is also dilated at 4 to 5 mm. Only portions of the pancreatic head and proximal body is able to the visualized with the remaining portions obscured by overlying bowel gas. The right kidney measures 9.0 cm and is echogenic. There is no hydronephrosis. The visualized portions of the abdominal aorta and inferior vena cava are unremarkable. There is normal hepatopetal flow within the main portal vein. No free fluid is seen within the right upper quadrant. 1. Dilated common bile duct as well as dilatation of the visualized pancreatic duct. While these findings could be in part due to biliary ectasia following cholecystectomy, the possibility of an obstructing mass or stricture cannot be excluded. An MRCP or ERCP may be beneficial if there is further clinical concern. 2. Echogenic and atrophic right kidney suggesting medical renal disease. Current Meds:  
Current Facility-Administered Medications Medication Dose Route Frequency  furosemide (LASIX) tablet 40 mg  40 mg Oral DAILY  alcohol 62% (NOZIN) nasal  1 Ampule  1 Ampule Topical Q12H  
 bisacodyL (DULCOLAX) suppository 10 mg  10 mg Rectal DAILY  sodium chloride (NS) flush 5-40 mL  5-40 mL IntraVENous Q8H  
 sodium chloride (NS) flush 5-40 mL  5-40 mL IntraVENous PRN  
 acetaminophen (TYLENOL) tablet 650 mg  650 mg Oral Q6H PRN Or  
 acetaminophen (TYLENOL) suppository 650 mg  650 mg Rectal Q6H PRN  polyethylene glycol (MIRALAX) packet 17 g  17 g Oral DAILY PRN  promethazine (PHENERGAN) tablet 12.5 mg  12.5 mg Oral Q6H PRN Or  
 ondansetron (ZOFRAN) injection 4 mg  4 mg IntraVENous Q6H PRN  
 aspirin delayed-release tablet 81 mg  81 mg Oral DAILY  [Held by provider] atorvastatin (LIPITOR) tablet 40 mg  40 mg Oral QHS  clopidogreL (PLAVIX) tablet 75 mg  75 mg Oral DAILY  gabapentin (NEURONTIN) capsule 100 mg  100 mg Oral TID  losartan (COZAAR) tablet 100 mg  100 mg Oral DAILY  metoprolol succinate (TOPROL-XL) XL tablet 12.5 mg 12.5 mg Oral DAILY  insulin lispro (HUMALOG) injection   SubCUTAneous AC&HS  cefTRIAXone (ROCEPHIN) 1 g in 0.9% sodium chloride (MBP/ADV) 50 mL MBP  1 g IntraVENous Q24H Problem List: 
Hospital Problems as of 5/7/2021 Date Reviewed: 4/19/2021 Codes Class Noted - Resolved POA Constipation ICD-10-CM: K59.00 ICD-9-CM: 564.00  5/6/2021 - Present Unknown Acute diastolic CHF (congestive heart failure) (HCC) ICD-10-CM: I50.31 ICD-9-CM: 428.31, 428.0  5/5/2021 - Present Diastolic CHF, chronic (HCC) ICD-10-CM: I50.32 
ICD-9-CM: 428.32, 428.0  5/5/2021 - Present Pulmonary HTN ([facility] ICD-10-CM: I27.20 ICD-9-CM: 416.8  5/5/2021 - Present Unknown Hyperkalemia ICD-10-CM: E87.5 ICD-9-CM: 276.7  5/5/2021 - Present Unknown UTI (urinary tract infection) ICD-10-CM: N39.0 ICD-9-CM: 599.0  5/5/2021 - Present Unknown Chronic hypoxemic respiratory failure [facility]) ICD-10-CM: J96.11 
ICD-9-CM: 518.83, 799.02  5/5/2021 - Present Unknown Hypercapnemia ICD-10-CM: R06.89 
ICD-9-CM: 786.09  5/5/2021 - Present Unknown Abnormal LFTs ICD-10-CM: R94.5 ICD-9-CM: 790.6  5/5/2021 - Present Unknown * (Principal) [facility] ICD-10-CM: J96.01 
ICD-9-CM: 518.81  12/24/2020 - Present Yes Metabolic encephalopathy KBV-59-AX: G93.41 
ICD-9-CM: 348.31  1/30/2019 - Present Unknown Coronary artery disease involving coronary bypass graft of native heart without angina pectoris (Chronic) ICD-10-CM: Y43.066 ICD-9-CM: 414.05  6/15/2015 - Present Yes Uncontrolled type 2 diabetes mellitus with hyperglycemia (HCC) (Chronic) ICD-10-CM: E11.65 ICD-9-CM: 250.02  6/15/2015 - Present Yes Essential hypertension (Chronic) ICD-10-CM: I10 
ICD-9-CM: 401.9  6/15/2015 - Present Yes HLD (hyperlipidemia) (Chronic) ICD-10-CM: O06.1 ICD-9-CM: 272.4  6/15/2015 - Present Yes  Chronic kidney disease, stage III (moderate) ([facility] (Chronic) ICD-10-CM: N18.30 ICD-9-CM: 585.3  6/15/2015 - Present Yes Part of this note was written by using a voice dictation software and the note has been proof read but may still contain some grammatical/other typographical errors. Signed By: DO Krzysztof Chris [facility] Service  May 7, 2021  5:15 PM

## 2021-05-07 NOTE — PROGRESS NOTES
Physical Therapy Note: 
 
Physical therapy evaluation orders received and chart reviewed. Attempted to see patient this AM to initiate assessment. Patient currently off of the floor for testing (ultrasound). Will follow and re-attempt at a later time/date as schedule permits/patient available. Thank you, Eloy Huitron, PT, DPT

## 2021-05-08 NOTE — PROGRESS NOTES
Problem: Falls - Risk of 
Goal: *Absence of Falls Description: Document Authmaricruz Wareers Fall Risk and appropriate interventions in the flowsheet. Outcome: Progressing Towards Goal 
Note: Fall Risk Interventions: 
Mobility Interventions: Bed/chair exit alarm, Communicate number of staff needed for ambulation/transfer, PT Consult for assist device competence, PT Consult for mobility concerns, Patient to call before getting OOB Medication Interventions: Bed/chair exit alarm, Evaluate medications/consider consulting pharmacy, Patient to call before getting OOB, Teach patient to arise slowly Elimination Interventions: Call light in reach, Bed/chair exit alarm, Patient to call for help with toileting needs History of Falls Interventions: Bed/chair exit alarm, Door open when patient unattended Problem: Patient Education: Go to Patient Education Activity Goal: Patient/Family Education Outcome: Progressing Towards Goal 
  
Problem: Pressure Injury - Risk of 
Goal: *Prevention of pressure injury Description: Document Ashu Scale and appropriate interventions in the flowsheet. Outcome: Progressing Towards Goal 
Note: Pressure Injury Interventions: 
Sensory Interventions: Assess changes in LOC, Pressure redistribution bed/mattress (bed type) Moisture Interventions: Absorbent underpads, Internal/External urinary devices Activity Interventions: Increase time out of bed, Pressure redistribution bed/mattress(bed type), PT/OT evaluation Mobility Interventions: Pressure redistribution bed/mattress (bed type), PT/OT evaluation, HOB 30 degrees or less Nutrition Interventions: Document food/fluid/supplement intake Friction and Shear Interventions: HOB 30 degrees or less, Minimize layers, Foam dressings/transparent film/skin sealants Problem: Patient Education: Go to Patient Education Activity Goal: Patient/Family Education Outcome: Progressing Towards Goal 
  
Problem: Urinary Tract Infection - Adult Goal: *Absence of infection signs and symptoms Outcome: Progressing Towards Goal 
  
Problem: Patient Education: Go to Patient Education Activity Goal: Patient/Family Education Outcome: Progressing Towards Goal 
  
Problem: Pain Goal: *Control of Pain Outcome: Progressing Towards Goal 
  
Problem: Patient Education: Go to Patient Education Activity Goal: Patient/Family Education Outcome: Progressing Towards Goal 
  
Problem: Patient Education: Go to Patient Education Activity Goal: Patient/Family Education Outcome: Progressing Towards Goal 
  
Problem: Patient Education: Go to Patient Education Activity Goal: Patient/Family Education Outcome: Progressing Towards Goal

## 2021-05-08 NOTE — CONSULTS
[facility] Consult Note Referring Physician:  Dr Jairon Sparks Date:  5/7/2021 Admit Date:  5/5/2021 Chief Complaint:  Elevated LFTs Subjective:  
 
History of Present Illness:  Patient is a 78 y.o. AAF with CAD and CHF who is seen in consultation at the request of Dr. Shirley Del Castillo for elevated LFTs with abnormal U/S. Per the chart, she was admitted with AMS (though responsive upon arrival) and found to have acute on chronic CHF as well as a UTI. Her LFTs (, AST 38, , and normal bili) were elevated prompting RUQ U/S. This was concerning for possible CBD dilation as well as a mildly dilated PD. Previous LFTs in April were normal. She denies abdominal pain or N/V.  
 
PMH: 
Past Medical History:  
Diagnosis Date  Acute cystitis without hematuria 1/30/2019  Acute pancreatitis 8/12/2019  CAD (coronary artery disease)  Diabetic ketoacidosis ([facility] 3/8/2020  DM2 (diabetes mellitus, type 2) ([facility] [address]  Elevated liver function tests 8/8/2019  Encephalopathy 2/7/2021  Gout  Gram-negative bacteremia 8/9/2019  HLD (hyperlipidemia)  HTN (hypertension)  Peripheral neuropathy  Right lower quadrant abdominal pain 8/8/2019 PSH: 
Past Surgical History:  
Procedure Laterality Date  HX CHOLECYSTECTOMY jennifer in 1964  HX CORONARY ARTERY BYPASS GRAFT    
 x3  
 HX TUBAL LIGATION    
 IR INSERT NON TUNL CVC OVER 5 YRS  1/11/2021  [place] CARDIAC SURG PROCEDURE UNLIST    
 stents x 3 2009 Allergies: Allergies Allergen Reactions  Sulfa (Sulfonamide Antibiotics) Swelling Home Medications: 
Prior to Admission medications Medication Sig Start Date End Date Taking? Authorizing Provider  
ferrous sulfate 325 mg (65 mg iron) tablet Take  by mouth every other day. Yes Provider, Historical  
insulin detemir U-100 (Levemir U-100 Insulin) 100 unit/mL injection 5 Units by SubCUTAneous route Every morning.    Yes Provider, Historical Omeprazole delayed release (PRILOSEC D/R) 20 mg tablet Take 20 mg by mouth daily. Yes Provider, Historical  
ascorbic acid, vitamin C, (Vitamin C) 500 mg tablet Take  by mouth every other day. Yes Provider, Historical  
insulin lispro (HUMALOG) 100 unit/mL injection Please give as per sliding scale 4/20/21  Yes Blanca Carr MD  
furosemide (LASIX) 40 mg tablet Take 1 Tab by mouth daily for 30 days. 4/20/21 5/20/21 Yes Blanca Carr MD  
aspirin delayed-release 81 mg tablet Take  by mouth daily. Yes Provider, Historical  
Insulin Needles, Disposable, 31 gauge x 5/16\" ndle by SubCUTAneous route Before breakfast, lunch, dinner and at bedtime. 9/20/19  Yes Ravi Coates NP  
clopidogrel (PLAVIX) 75 mg tab Take 75 mg by mouth. Yes Provider, Historical  
Blood-Glucose Meter (ONETOUCH ULTRAMINI) monitoring kit Use as directed 4/14/15  Yes Zahraa Correa MD  
glucose blood VI test strips (ONETOUCH ULTRA TEST) strip Test 4 times daily as directed 4/14/15  Yes Zahraa Correa MD  
Lancets [facility] ULTRASOFT LANCETS) misc Test 4 times daily as directed 4/14/15  Yes Zahraa Correa MD  
gabapentin (NEURONTIN) 100 mg capsule Take 1 Cap by mouth three (3) times daily. 3/9/15  Yes Zahraa Correa MD  
metoprolol tartrate (LOPRESSOR) 25 mg tablet Take 0.5 Tabs by mouth every twelve (12) hours. 3/16/20   Barfell, Jerlyn Meigs, MD  
losartan (COZAAR) 100 mg tablet Take  by mouth daily. 1/12/15   Provider, Historical  
rosuvastatin (CRESTOR) 20 mg tablet Take 1 Tab by mouth nightly. 3/9/15   Zahraa Correa MD  
rOPINIRole (REQUIP) 0.5 mg tablet Take 1 Tab by mouth nightly as needed. 4/21/14   Zahraa Correa MD  
 
 
Hospital Medications: 
Current Facility-Administered Medications Medication Dose Route Frequency  furosemide (LASIX) tablet 40 mg  40 mg Oral DAILY  sodium zirconium cyclosilicate (LOKELMA) powder packet 10 g  10 g Oral TID WITH MEALS  
 bisacodyL (DULCOLAX) suppository 10 mg  10 mg Rectal DAILY PRN  
 alcohol 62% (NOZIN) nasal  1 Ampule  1 Ampule Topical Q12H  
 sodium chloride (NS) flush 5-40 mL  5-40 mL IntraVENous Q8H  
 sodium chloride (NS) flush 5-40 mL  5-40 mL IntraVENous PRN  
 acetaminophen (TYLENOL) tablet 650 mg  650 mg Oral Q6H PRN Or  
 acetaminophen (TYLENOL) suppository 650 mg  650 mg Rectal Q6H PRN  polyethylene glycol (MIRALAX) packet 17 g  17 g Oral DAILY PRN  promethazine (PHENERGAN) tablet 12.5 mg  12.5 mg Oral Q6H PRN Or  
 ondansetron (ZOFRAN) injection 4 mg  4 mg IntraVENous Q6H PRN  
 aspirin delayed-release tablet 81 mg  81 mg Oral DAILY  [Held by provider] atorvastatin (LIPITOR) tablet 40 mg  40 mg Oral QHS  clopidogreL (PLAVIX) tablet 75 mg  75 mg Oral DAILY  gabapentin (NEURONTIN) capsule 100 mg  100 mg Oral TID  [Held by provider] losartan (COZAAR) tablet 100 mg  100 mg Oral DAILY  metoprolol succinate (TOPROL-XL) XL tablet 12.5 mg  12.5 mg Oral DAILY  insulin lispro (HUMALOG) injection   SubCUTAneous AC&HS  cefTRIAXone (ROCEPHIN) 1 g in 0.9% sodium chloride (MBP/ADV) 50 mL MBP  1 g IntraVENous Q24H Social History: 
Social History Tobacco Use  Smoking status: Never Smoker  Smokeless tobacco: Never Used Substance Use Topics  Alcohol use: Yes Comment: socially Family History: 
Family History Problem Relation Age of Onset  Hypertension Mother  Cancer Mother  Diabetes Mother  Heart Disease Mother Review of Systems: A detailed 10 system ROS is obtained, with pertinent positives as listed above. All others are negative. Diet:   
 
Objective:  
 
Physical Exam: 
Vitals: 
Visit Vitals BP (!) 168/88 (BP 1 Location: Left upper arm, BP Patient Position: At rest) Pulse 77 Temp 97.8 °F (36.6 °C) Resp 17 Ht 5' 5\" (1.651 m) Wt 61.7 kg (136 lb) SpO2 95% BMI 22.63 kg/m² Gen:  Pt is alert, cooperative, no acute distress Skin:  No jaundice. HEENT: Sclerae anicteric. NC in nares. Cardiovascular: Regular rate and rhythm. No murmurs, gallops, or rubs. Respiratory:  Comfortable breathing with no accessory muscle use. Clear breath sounds anteriorly with no wheezes, rales, or rhonchi. GI:  Abdomen nondistended, soft, and nontender. Normal active bowel sounds. Rectal:  Deferred Musculoskeletal:  No pitting edema of the lower legs. Neurological:  Gross memory appears intact. Patient is alert and oriented. Laboratory:   
Recent Labs 05/07/21 2026 05/07/21 
1214 05/07/21 
0434 05/06/21 2003 05/06/21 
0453 05/05/21 
2237 05/05/21 
1134 WBC  --   --  6.2  --  5.1  --  5.2 HGB  --   --  7.8* 7.9* 7.7*  --  8.0* HCT  --   --  25.1* 25.0* 24.5*  --  24.8* PLT  --   --  141*  --  144*  --  149* MCV  --   --  103.7*  --  102.9*  --  102.1* NA  --   --  144  --  144 145 144  
K 5.8* 6.0* 5.2*  --  4.9 5.2* 5.3*  
CL  --   --  111*  --  110* 112* 114* CO2  --   --  30  --  29 30 28 BUN  --   --  49*  --  47* 45* 42* CREA  --   --  1.61*  --  1.60* 1.58* 1.58* CA  --   --  9.1  --  8.9 9.1 8.8 GLU  --   --  117*  --  135* 187* 149* AP  --   --  157*  --  168*  --  165* ALT  --   --  141*  --  190*  --  206* TBILI  --   --  0.2  --  0.3  --  0.2 PTP  --   --   --   --  13.2  --   --   
INR  --   --   --   --  1.0  --   --   
APTT  --   --   --   --  29.7  --   --   
 
Right upper quadrant ultrasound 
  
HISTORY: Transaminitis. Assess for cirrhosis or cholelithiasis 
  
Real-time sonography of the right upper quadrant was performed.  
  
Comparison: None. Correlation is made to the noncontrast CT scan of the abdomen 
and pelvis 08/08/1990. 
  
FINDINGS: Hepatic echotexture is felt to be at the upper limits of normal. There 
is no intrahepatic biliary ductal dilatation. There is no focal hepatic mass. 
  
The gallbladder is absent. The common bile duct is dilated measuring 14 mm. 
  
The pancreatic duct is also dilated at 4 to 5 mm.  Only portions of the pancreatic head and proximal body is able to the visualized with the remaining 
portions obscured by overlying bowel gas. 
  
The right kidney measures 9.0 cm and is echogenic. There is no hydronephrosis. 
  
The visualized portions of the abdominal aorta and inferior vena cava are 
unremarkable. There is normal hepatopetal flow within the main portal vein. No 
free fluid is seen within the right upper quadrant. 
  
IMPRESSION 1. Dilated common bile duct as well as dilatation of the visualized pancreatic 
duct. While these findings could be in part due to biliary ectasia following 
cholecystectomy, the possibility of an obstructing mass or stricture cannot be 
excluded. An MRCP or ERCP may be beneficial if there is further clinical 
concern. 2. Echogenic and atrophic right kidney suggesting medical renal disease. Assessment: 
  
 
Principal Problem: 
  Acute hypoxemic respiratory failure ([address] (12/24/2020) Active Problems: 
  Coronary artery disease involving coronary bypass graft of native heart without angina pectoris (6/15/2015) Uncontrolled type 2 diabetes mellitus with hyperglycemia ([address] (6/15/2015) Essential hypertension (6/15/2015) HLD (hyperlipidemia) (6/15/2015) Chronic kidney disease, stage III (moderate) (HCC) (6/15/2015) Metabolic encephalopathy (0/63/2094) Pulmonary HTN ([address] (5/5/2021) Hyperkalemia (5/5/2021) UTI (urinary tract infection) (5/5/2021) Chronic hypoxemic respiratory failure ([address] (5/5/2021) Hypercapnemia (5/5/2021) Abnormal LFTs (5/5/2021) Constipation (5/6/2021) Plan: 
  
 
 
 
Pleasant 79yo AAF with multiple medical problems as above presenting with CHF and UTI, found to have elevated LFTs and abnormal U/S. Her LFTs could be elevated due to hepatic congestion from her CHF and dilated CBD could be due to hx of CCY.  However, there is possible PD dilation as well (?double duct sign?) which raises the possibility of a stricture or malignancy. Currently, she states she feels well. - Trend LFTs 
- MRI w/ contrast (pancreatic protocol) to assess for possible mass + MRCP to better evaluate her bile ducts - Continue current treatment for CHF as this may improve her LFTs as well Sunny Barrett MD

## 2021-05-08 NOTE — PROGRESS NOTES
GI DAILY PROGRESS NOTE Admit Date:  5/5/2021 Today's Date:  5/8/2021 CC:  Elevated LFTs, Abnormal U/S Subjective:  
 
Patient feeling well, c/o leg pain. No abdominal pain, no N/V. Medications:  
Current Facility-Administered Medications Medication Dose Route Frequency  furosemide (LASIX) tablet 40 mg  40 mg Oral DAILY  sodium zirconium cyclosilicate (LOKELMA) powder packet 10 g  10 g Oral TID WITH MEALS  
 bisacodyL (DULCOLAX) suppository 10 mg  10 mg Rectal DAILY PRN  
 alcohol 62% (NOZIN) nasal  1 Ampule  1 Ampule Topical Q12H  
 sodium chloride (NS) flush 5-40 mL  5-40 mL IntraVENous Q8H  
 sodium chloride (NS) flush 5-40 mL  5-40 mL IntraVENous PRN  
 acetaminophen (TYLENOL) tablet 650 mg  650 mg Oral Q6H PRN Or  
 acetaminophen (TYLENOL) suppository 650 mg  650 mg Rectal Q6H PRN  polyethylene glycol (MIRALAX) packet 17 g  17 g Oral DAILY PRN  promethazine (PHENERGAN) tablet 12.5 mg  12.5 mg Oral Q6H PRN Or  
 ondansetron (ZOFRAN) injection 4 mg  4 mg IntraVENous Q6H PRN  
 aspirin delayed-release tablet 81 mg  81 mg Oral DAILY  [Held by provider] atorvastatin (LIPITOR) tablet 40 mg  40 mg Oral QHS  clopidogreL (PLAVIX) tablet 75 mg  75 mg Oral DAILY  gabapentin (NEURONTIN) capsule 100 mg  100 mg Oral TID  [Held by provider] losartan (COZAAR) tablet 100 mg  100 mg Oral DAILY  metoprolol succinate (TOPROL-XL) XL tablet 12.5 mg  12.5 mg Oral DAILY  insulin lispro (HUMALOG) injection   SubCUTAneous AC&HS  cefTRIAXone (ROCEPHIN) 1 g in 0.9% sodium chloride (MBP/ADV) 50 mL MBP  1 g IntraVENous Q24H Review of Systems: A review of system was obtained, with pertinent positives as listed above. All others are negative. Objective:  
Vitals: 
Visit Vitals /65 (BP 1 Location: Left upper arm, BP Patient Position: At rest) Pulse 61 Temp 98.6 °F (37 °C) Resp 16 Ht 5' 5\" (1.651 m) Wt 62 kg (136 lb 9.6 oz) SpO2 100% BMI 22.73 kg/m² Intake/Output: 
No intake/output data recorded. 05/06 1901 - 05/08 0700 In: -  
Out: 2100 [Urine:2100] Exam: 
General appearance: alert, cooperative, no distress Lungs: clear to auscultation bilaterally anteriorly Heart: regular rate and rhythm Abdomen: soft, non-tender. Bowel sounds normal. No masses,  no organomegaly Extremities: extremities normal, atraumatic, no cyanosis or edema Neuro:  Alert and oriented without obvious neurological deficits. Data Review (Labs):   
Recent Labs 05/08/21 
2466 05/07/21 2026 05/07/21 
1214 05/07/21 
0434 05/06/21 2003 05/06/21 
0453 05/05/21 
2237 05/05/21 
1134 WBC 6.1  --   --  6.2  --  5.1  --  5.2 HGB 8.0*  --   --  7.8* 7.9* 7.7*  --  8.0*  
HCT 25.1*  --   --  25.1* 25.0* 24.5*  --  24.8*  
*  --   --  141*  --  144*  --  149* .2*  --   --  103.7*  --  102.9*  --  102.1*   --   --  144  --  144 145 144  
K 5.6* 5.8* 6.0* 5.2*  --  4.9 5.2* 5.3*  
*  --   --  111*  --  110* 112* 114* CO2 30  --   --  30  --  29 30 28 BUN 50*  --   --  49*  --  47* 45* 42* CREA 1.46*  --   --  1.61*  --  1.60* 1.58* 1.58* CA 8.9  --   --  9.1  --  8.9 9.1 8.8 GLU 89  --   --  117*  --  135* 187* 149* *  --   --  157*  --  168*  --  165* *  --   --  141*  --  190*  --  206* TBILI 0.2  --   --  0.2  --  0.3  --  0.2 PTP  --   --   --   --   --  13.2  --   --   
INR  --   --   --   --   --  1.0  --   --   
APTT  --   --   --   --   --  29.7  --   --   
 
 
Assessment:  
 
Principal Problem: 
  Acute hypoxemic respiratory failure ([facility] (12/24/2020) Active Problems: 
  Coronary artery disease involving coronary bypass graft of native heart without angina pectoris (6/15/2015) Uncontrolled type 2 diabetes mellitus with hyperglycemia ([facility] (6/15/2015) Essential hypertension (6/15/2015) HLD (hyperlipidemia) (6/15/2015)   Chronic kidney disease, stage III (moderate) (HCC) (6/15/2015) Metabolic encephalopathy (9/23/8895) Pulmonary HTN ([facility] [address] (5/5/2021) Hyperkalemia (5/5/2021) UTI (urinary tract infection) (5/5/2021) Chronic hypoxemic respiratory failure ([facility] [address] (5/5/2021) Hypercapnemia (5/5/2021) Abnormal LFTs (5/5/2021) Constipation (5/6/2021) Plan:  
 
 
  
Pleasant 79yo AAF with multiple medical problems  presenting with CHF and UTI, found to have elevated LFTs and abnormal U/S. Her LFTs could be elevated due to hepatic congestion from her CHF and dilated CBD could be due to hx of CCY. However, there is possible PD dilation as well (?double duct sign?) which raises the possibility of a stricture or malignancy. Currently, she states she feels well. - Trend LFTs (better today) - MRI (pancreatic protocol) and MRCP today - Further rec's pending the read. If normal, no further work up needed - Pt can resume previous diet after MRI 
 
MD Yamile Jaeger MD

## 2021-05-08 NOTE — PROGRESS NOTES
Reviewed notes for spiritual concerns Noted: 
 
BIRTHDAY  [birth year]10 DNR 
 
NO DIRECTIVES ON FILE [address] 2019 EXPECTED DISCHARGE 2 DAYS 
 
BLIND BOTH EYES 
 
HARD OF HEARING BILATERAL 
 
CAME FROM [facility] 
 
HIGH RISK FOR DECLINE OR DEATH X 12 MOS Will continue to assess how we can best serve this family

## 2021-05-08 NOTE — PROGRESS NOTES
Unique Hospitalist Progress Note Name:  Babar Wall  Age:80 y.o. Sex:female :  1940 MRN:  [id number] Admit Date:  2021 Reason for Admission: 
Acute diastolic CHF (congestive heart failure) ([facility] [address] [I50.31] Hospital Course/Interval history:  
 
Pt is a 78 y.o. female with a PMHx of HTN, CAD, T2DM, COPD on 2L NC baseline, dHFpEF, pulmonary HTN who was sent from [address] [facility] due to [facility]. Upon EMS arrival pt was immediately responsive. Of note, she has a 3-month hospital stay for COVID-19 pneumonia with a stormy course requiring hemodialysis and multiple endotracheal intubations. She was eventually discharged to skilled nursing facility and has been readmitted multiple times recently several weeks ago. In ED, CXR shows vascular congestion and edema with a pro-BNP 2639. K 5.3, and pt received lokelma.  and AST of 71 and alk phos 165. Hgb 8 but is chronic. UA consistent with UTI and she was started on Ceftriaxone She is being admitted with acute on chronic diastolic heart failure and acute metabolic encephalopathy. Subjective (21): This a.m. patient was pleasant at bedside. She was sleeping when I arrived but woke up easily. She was alert and oriented x3 and follow commands well. Stated that she is hungry and waiting for MRCP. Denies shortness of breath, chest pain, abdominal pain, nausea or vomiting. Review of Systems: 14 point review of systems is otherwise negative with the exception of the elements mentioned above. Assessment & Plan Acute metabolic encephalopathy, resolved CT head on admission shows no acute intracranial abnormalities. Most likely secondary to UTI Avoid sedating meds Delirium precautions PT/OT/ to eval and treat Infection management as stated Vit B12, folate, ammonia--wnl. TSH 4.0 but Free T4 0.9 at low normal. 
Alert and oriented x4 Acute on chronic dHFpEF exacerbation CXR on admission shows evidence of pulmonary edema with pro-BNP>2K. Recent TTE on 4/2021 shows EF 55 to 22% C/W diastolic dysfunction. Low-salt diet, accurate I&O's, daily weights Change Lasix IV to po d/t slight increase in Cr Changed metoprolol twice daily to ToprolXL Cardiology consulted, appreciate recs--agree with diuresis. On standby CXR shows slight interval improvement Wean down to 2 L NC this a.m. which is her baseline UTI Previous urine cultures grew E. coli Abx: Rocephin (5/5-. ..) UCx 5/5 >100K GNR, pending S/S 
BCx 5/5 NGTD Transaminitis  and AST 31 on admission. Hold home statin Acute hepatitis panel unremarkable RUQ ultrasound showing dilated CBD could be d/t biliary ectasia vs obstructing mass or stricture. Recommends MRCP or ERCP. Consulted GI, appreciate recs MRI with MRCP pending today COPD On 2 L O2 chronically at baseline Continue home inhalers Constipation MiraLAX daily and Dulcolax daily prn for now Hyperkalemia Cont Lokelma TID for now On SSI as well Holding Losartan. Cont Lasix F/u AM BMP Macrocytic anemia Chronic at baseline (7.7-9.9) CAD status post CABG Continue home DAPT, BB, losartan. Holding statin d/t elevated LFT's 
 
DMII 
SSI w/ BG check qAC/HS 
 
CKD stage III Creatinine at baseline (1.3-2.0) Monitor renal function while on diuretics Diet:  DIET DIABETIC CONSISTENT CARB 
DVT PPx: SCD's Code status: DNR Disposition/Expected LOS: Hopefully Monday 5/10 as ARAMIS is working with [address] [place] [address]. Pending final UCX and change of abx. Also GI recs and if O2 remains stable. Objective:  
 
Patient Vitals for the past 24 hrs: 
 Temp Pulse Resp BP SpO2  
05/08/21 0738 98.6 °F (37 °C) 61 16 138/65 100 % 05/08/21 0503 98.2 °F (36.8 °C) 69 17 94/63 93 % 05/08/21 0103 97.9 °F (36.6 °C) 64 17 (!) 157/66 94 % 05/07/21 2327     96 % 05/07/21 2010 97.8 °F (36.6 °C) 77 17 (!) 168/88 95 % 05/07/21 1514 97.8 °F (36.6 °C) (!) 53 17 139/66 100 % Oxygen Therapy O2 Sat (%): 100 % (05/08/21 0738) Pulse via Oximetry: 49 beats per minute (05/05/21 2051) O2 Device: Nasal cannula (05/07/21 2327) O2 Flow Rate (L/min): 2 l/min (05/07/21 2327) Body mass index is 22.73 kg/m². Physical Exam:  
General:  No acute distress, speaking in full sentences, no use of accessory muscles. Lungs:  CTA mostly with slight coarse lung sounds at bases CV:  Regular rate and rhythm with normal S1 and S2 Abdomen:  Soft, nontender, nondistended, normoactive bowel sounds Extremities:  No cyanosis clubbing or edema Neuro:   Nonfocal, A&O x3. Follows commands well Psych:  Normal affect Data Review: 
I have reviewed all labs, meds, and studies from the last 24 hours: 
 
Labs: 
 
Recent Results (from the past 24 hour(s)) POTASSIUM Collection Time: 05/07/21 12:14 PM  
Result Value Ref Range Potassium 6.0 (H) 3.5 - 5.1 mmol/L  
GLUCOSE, POC Collection Time: 05/07/21  4:34 PM  
Result Value Ref Range Glucose (POC) 105 (H) 65 - 100 mg/dL Performed by [employer] POTASSIUM Collection Time: 05/07/21  8:26 PM  
Result Value Ref Range Potassium 5.8 (H) 3.5 - 5.1 mmol/L  
GLUCOSE, POC Collection Time: 05/07/21  9:26 PM  
Result Value Ref Range Glucose (POC) 254 (H) 65 - 100 mg/dL Performed by [facility]   
CBC W/O DIFF Collection Time: 05/08/21  5:51 AM  
Result Value Ref Range WBC 6.1 4.3 - 11.1 K/uL  
 RBC 2.48 (L) 4.05 - 5.2 M/uL HGB 8.0 (L) 11.7 - 15.4 g/dL HCT 25.1 (L) 35.8 - 46.3 % .2 (H) 79.6 - 97.8 FL  
 MCH 32.3 26.1 - 32.9 PG  
 MCHC 31.9 31.4 - 35.0 g/dL  
 RDW 15.6 (H) 11.9 - 14.6 % PLATELET 030 (L) 767 - 450 K/uL MPV 11.3 9.4 - 12.3 FL ABSOLUTE NRBC 0.00 0.0 - 0.2 K/uL METABOLIC PANEL, COMPREHENSIVE Collection Time: 05/08/21  5:51 AM  
Result Value Ref Range Sodium 143 136 - 145 mmol/L Potassium 5.6 (H) 3.5 - 5.1 mmol/L Chloride 110 (H) 98 - 107 mmol/L  
 CO2 30 21 - 32 mmol/L  Anion gap 3 (L) 7 - 16 mmol/L Glucose 89 65 - 100 mg/dL BUN 50 (H) 8 - 23 MG/DL Creatinine 1.46 (H) 0.6 - 1.0 MG/DL  
 GFR est AA 44 (L) >60 ml/min/1.73m2 GFR est non-AA 36 (L) >60 ml/min/1.73m2 Calcium 8.9 8.3 - 10.4 MG/DL Bilirubin, total 0.2 0.2 - 1.1 MG/DL  
 ALT (SGPT) 116 (H) 12 - 65 U/L  
 AST (SGOT) 38 (H) 15 - 37 U/L Alk. phosphatase 146 (H) 50 - 136 U/L Protein, total 6.8 6.3 - 8.2 g/dL Albumin 2.9 (L) 3.2 - 4.6 g/dL Globulin 3.9 (H) 2.3 - 3.5 g/dL A-G Ratio 0.7 (L) 1.2 - 3.5 GLUCOSE, POC Collection Time: 05/08/21  6:41 AM  
Result Value Ref Range Glucose (POC) 110 (H) 65 - 100 mg/dL Performed by [employer] GLUCOSE, POC Collection Time: 05/08/21 11:06 AM  
Result Value Ref Range Glucose (POC) 133 (H) 65 - 100 mg/dL Performed by [employer] Results Procedure Component Value Units Date/Time CULTURE, BLOOD [[id number]] Collected: 05/05/21 1505 Order Status: Completed Specimen: Blood Updated: 05/08/21 7664 Special Requests: RIGHT FOREARM Culture result: NO GROWTH 3 DAYS     
 CULTURE, BLOOD [[id number]] Collected: 05/05/21 2237 Order Status: Completed Specimen: Blood Updated: 05/08/21 1210 Special Requests: --     
  RIGHT Antecubital 
  
  Culture result: NO GROWTH 2 DAYS     
 CULTURE, URINE [[id number]]  (Abnormal)  (Susceptibility) Collected: 05/05/21 1422 Order Status: Completed Specimen: Urine from Clean catch Updated: 05/08/21 7439 Special Requests: NO SPECIAL REQUESTS Culture result:    
  >100,000 COLONIES/mL KLEBSIELLA PNEUMONIAE  
     
      
  <10,000 COLONIES/mL MIXED SKIN REDD ISOLATED  
     
  
 
 
EKG Results Procedure 720 Value Units Date/Time EKG, 12 LEAD, INITIAL [[id number]] Collected: 05/05/21 1142 Order Status: Completed Updated: 05/05/21 1714 Ventricular Rate 50 BPM   
  Atrial Rate 50 BPM   
  P-R Interval 246 ms   
  QRS Duration 88 ms   Q-T Interval 482 ms QTC Calculation (Bezet) 439 ms Calculated P Axis 36 degrees Calculated R Axis 59 degrees Calculated T Axis 109 degrees Diagnosis --  
  Sinus bradycardia with 1st degree A-V block Cannot rule out Anterior infarct (cited on or before 16-APR-2021) T wave abnormality, consider lateral ischemia Abnormal ECG When compared with ECG of 16-APR-2021 12:24, No significant change was found Confirmed by Walker Newman ([id number]) on 5/5/2021 5:14:17 PM 
  
  
 
 
Other Studies: Xr Chest Sngl V Result Date: 5/7/2021 PORTABLE CHEST, May 7, 2021 at 1801 hours CLINICAL HISTORY:  Shortness of breath. COMPARISON:  May 5, 2021. FINDINGS:  AP erect image demonstrates cardiomegaly status post CABG of pulmonary venous congestion, edema, and small pleural effusions in a pattern most consistent with congestive heart failure/fluid imbalance. Edema is slightly improved since prior study. A definite pneumothorax. There are overlying radiopaque support devices. SLIGHT INTERVAL IMPROVEMENT IN CONGESTIVE HEART FAILURE/FLUID IMBALANCE. Current Meds:  
Current Facility-Administered Medications Medication Dose Route Frequency  furosemide (LASIX) tablet 40 mg  40 mg Oral DAILY  sodium zirconium cyclosilicate (LOKELMA) powder packet 10 g  10 g Oral TID WITH MEALS  
 bisacodyL (DULCOLAX) suppository 10 mg  10 mg Rectal DAILY PRN  
 alcohol 62% (NOZIN) nasal  1 Ampule  1 Ampule Topical Q12H  
 sodium chloride (NS) flush 5-40 mL  5-40 mL IntraVENous Q8H  
 sodium chloride (NS) flush 5-40 mL  5-40 mL IntraVENous PRN  
 acetaminophen (TYLENOL) tablet 650 mg  650 mg Oral Q6H PRN Or  
 acetaminophen (TYLENOL) suppository 650 mg  650 mg Rectal Q6H PRN  polyethylene glycol (MIRALAX) packet 17 g  17 g Oral DAILY PRN  promethazine (PHENERGAN) tablet 12.5 mg  12.5 mg Oral Q6H PRN  Or  
 ondansetron (ZOFRAN) injection 4 mg  4 mg IntraVENous Q6H PRN  
 aspirin delayed-release tablet 81 mg  81 mg Oral DAILY  [Held by provider] atorvastatin (LIPITOR) tablet 40 mg  40 mg Oral QHS  clopidogreL (PLAVIX) tablet 75 mg  75 mg Oral DAILY  gabapentin (NEURONTIN) capsule 100 mg  100 mg Oral TID  [Held by provider] losartan (COZAAR) tablet 100 mg  100 mg Oral DAILY  metoprolol succinate (TOPROL-XL) XL tablet 12.5 mg  12.5 mg Oral DAILY  insulin lispro (HUMALOG) injection   SubCUTAneous AC&HS  cefTRIAXone (ROCEPHIN) 1 g in 0.9% sodium chloride (MBP/ADV) 50 mL MBP  1 g IntraVENous Q24H Problem List: 
Hospital Problems as of 5/8/2021 Date Reviewed: 4/19/2021 Codes Class Noted - Resolved POA Constipation ICD-10-CM: K59.00 ICD-9-CM: 564.00  5/6/2021 - Present Unknown Acute diastolic CHF (congestive heart failure) (HCC) ICD-10-CM: I50.31 ICD-9-CM: 428.31, 428.0  5/5/2021 - Present Diastolic CHF, chronic (HCC) ICD-10-CM: I50.32 
ICD-9-CM: 428.32, 428.0  5/5/2021 - Present Pulmonary HTN ([facility] ICD-10-CM: I27.20 ICD-9-CM: 416.8  5/5/2021 - Present Unknown Hyperkalemia ICD-10-CM: E87.5 ICD-9-CM: 276.7  5/5/2021 - Present Unknown UTI (urinary tract infection) ICD-10-CM: N39.0 ICD-9-CM: 599.0  5/5/2021 - Present Unknown Chronic hypoxemic respiratory failure [facility]) ICD-10-CM: J96.11 
ICD-9-CM: 518.83, 799.02  5/5/2021 - Present Unknown Hypercapnemia ICD-10-CM: R06.89 
ICD-9-CM: 786.09  5/5/2021 - Present Unknown Abnormal LFTs ICD-10-CM: R94.5 ICD-9-CM: 790.6  5/5/2021 - Present Unknown * (Principal) Acute hypoxemic respiratory failure ([facility] ICD-10-CM: J96.01 
ICD-9-CM: 518.81  12/24/2020 - Present Yes Metabolic encephalopathy ARG-14-TH: G93.41 
ICD-9-CM: 348.31  1/30/2019 - Present Unknown Coronary artery disease involving coronary bypass graft of native heart without angina pectoris (Chronic) ICD-10-CM: Z01.506 ICD-9-CM: 414.05  6/15/2015 - Present Yes  Uncontrolled type 2 diabetes mellitus with hyperglycemia (HCC) (Chronic) ICD-10-CM: E11.65 ICD-9-CM: 250.02  6/15/2015 - Present Yes Essential hypertension (Chronic) ICD-10-CM: I10 
ICD-9-CM: 401.9  6/15/2015 - Present Yes HLD (hyperlipidemia) (Chronic) ICD-10-CM: Z56.5 ICD-9-CM: 272.4  6/15/2015 - Present Yes Chronic kidney disease, stage III (moderate) (HCC) (Chronic) ICD-10-CM: N18.30 ICD-9-CM: 585.3  6/15/2015 - Present Yes Part of this note was written by using a voice dictation software and the note has been proof read but may still contain some grammatical/other typographical errors. Signed By: DO Krzysztof Ramirez Service  May 8, 2021  5:15 PM

## 2021-05-09 NOTE — PROGRESS NOTES
GI DAILY PROGRESS NOTE Admit Date:  5/5/2021 Today's Date:  5/9/2021 CC:  Elevated LFTs, Abnormal U/S Subjective:  
 
Patient feeling well, no abdominal pain. Breathing at baseline. Medications:  
Current Facility-Administered Medications Medication Dose Route Frequency  cefpodoxime (VANTIN) tablet 200 mg  200 mg Oral Q24H  
 sodium zirconium cyclosilicate (LOKELMA) powder packet 10 g  10 g Oral TID WITH MEALS  traMADoL (ULTRAM) tablet 50 mg  50 mg Oral BID PRN  
 furosemide (LASIX) tablet 40 mg  40 mg Oral DAILY  bisacodyL (DULCOLAX) suppository 10 mg  10 mg Rectal DAILY PRN  
 alcohol 62% (NOZIN) nasal  1 Ampule  1 Ampule Topical Q12H  
 sodium chloride (NS) flush 5-40 mL  5-40 mL IntraVENous Q8H  
 sodium chloride (NS) flush 5-40 mL  5-40 mL IntraVENous PRN  
 acetaminophen (TYLENOL) tablet 650 mg  650 mg Oral Q6H PRN Or  
 acetaminophen (TYLENOL) suppository 650 mg  650 mg Rectal Q6H PRN  polyethylene glycol (MIRALAX) packet 17 g  17 g Oral DAILY PRN  promethazine (PHENERGAN) tablet 12.5 mg  12.5 mg Oral Q6H PRN Or  
 ondansetron (ZOFRAN) injection 4 mg  4 mg IntraVENous Q6H PRN  
 aspirin delayed-release tablet 81 mg  81 mg Oral DAILY  [Held by provider] atorvastatin (LIPITOR) tablet 40 mg  40 mg Oral QHS  clopidogreL (PLAVIX) tablet 75 mg  75 mg Oral DAILY  gabapentin (NEURONTIN) capsule 100 mg  100 mg Oral TID  [Held by provider] losartan (COZAAR) tablet 100 mg  100 mg Oral DAILY  metoprolol succinate (TOPROL-XL) XL tablet 12.5 mg  12.5 mg Oral DAILY  insulin lispro (HUMALOG) injection   SubCUTAneous AC&HS Review of Systems: A review of system was obtained, with pertinent positives as listed above. All others are negative. Objective:  
Vitals: 
Visit Vitals BP (!) 122/57 (BP 1 Location: Left upper arm, BP Patient Position: At rest) Pulse 67 Temp 98.4 °F (36.9 °C) Resp 16 Ht 5' 5\" (1.651 m) Wt 62 kg (136 lb 9.6 oz) SpO2 90% BMI 22.73 kg/m² Intake/Output: 
No intake/output data recorded.  190 -  07 In: -  
Out: 1150 [[facility]] Exam: 
General appearance: alert, cooperative, no distress Lungs: clear to auscultation bilaterally anteriorly Heart: regular rate and rhythm Abdomen: soft, non-tender. Bowel sounds normal. No masses,  no organomegaly Extremities: extremities normal, atraumatic, no cyanosis or edema Neuro:  Alert and oriented without obvious neurological deficits. Data Review (Labs):   
Recent Labs 21 
9254 21 
1214 21 
0434 21 WBC 5.2 6.1  --   --  6.2  --   
HGB 7.8* 8.0*  --   --  7.8* 7.9*  
HCT 24.9* 25.1*  --   --  25.1* 25.0*  
* 142*  --   --  141*  --   
.0* 101.2*  --   --  103.7*  --   
 143  --   --  144  --   
K 4.9 5.6* 5.8* 6.0* 5.2*  --   
* 110*  --   --  111*  --   
CO2 31 30  --   --  30  --   
BUN 49* 50*  --   --  49*  --   
CREA 1.62* 1.46*  --   --  1.61*  --   
CA 9.1 8.9  --   --  9.1  --   
* 89  --   --  117*  --   
 146*  --   --  157*  --   
* 116*  --   --  141*  --   
TBILI 0.2 0.2  --   --  0.2  --   
 
 
Assessment:  
 
Principal Problem: 
  Acute hypoxemic respiratory failure (HCC) (2020) Active Problems: 
  Coronary artery disease involving coronary bypass graft of native heart without angina pectoris (6/15/2015) Uncontrolled type 2 diabetes mellitus with hyperglycemia ([facility] [address] (6/15/2015) Essential hypertension (6/15/2015) HLD (hyperlipidemia) (6/15/2015) Chronic kidney disease, stage III (moderate) (HCC) (6/15/2015) Metabolic encephalopathy () Pulmonary HTN ([facility] [address] (2021) Hyperkalemia (2021) UTI (urinary tract infection) (2021) Chronic hypoxemic respiratory failure ([facility] [address] (2021) Hypercapnemia (2021) Abnormal LFTs (2021) Constipation (2021) Plan:  
 
 
  
Pleasant 79yo AAF with multiple medical problems  presenting with CHF and UTI, found to have elevated LFTs and abnormal U/S. Her LFTs could be elevated due to hepatic congestion from her CHF and dilated CBD could be due to hx of CCY. However, there is possible PD dilation as well (?double duct sign?) which raises the possibility of a stricture or malignancy. Currently, she states she feels well. - Trend LFTs (improving) - MRI (pancreatic protocol) and MRCP: read pending - Further rec's pending the read. If normal, no further work up needed - If abnormal, may consider ERCP tomorrow. Will leave NPO after midnight in case Lisa Dorman MD

## 2021-05-09 NOTE — PROGRESS NOTES
Problem: Falls - Risk of 
Goal: *Absence of Falls Description: Document [place] Broad Fall Risk and appropriate interventions in the flowsheet. Outcome: Progressing Towards Goal 
Note: Fall Risk Interventions: 
Mobility Interventions: Bed/chair exit alarm, Patient to call before getting OOB Medication Interventions: Bed/chair exit alarm Elimination Interventions: Bed/chair exit alarm, Patient to call for help with toileting needs History of Falls Interventions: Bed/chair exit alarm, Door open when patient unattended Problem: Patient Education: Go to Patient Education Activity Goal: Patient/Family Education Outcome: Progressing Towards Goal 
  
Problem: Pressure Injury - Risk of 
Goal: *Prevention of pressure injury Description: Document Ashu Scale and appropriate interventions in the flowsheet. Outcome: Progressing Towards Goal 
Note: Pressure Injury Interventions: 
Sensory Interventions: Assess changes in LOC Moisture Interventions: Absorbent underpads, Check for incontinence Q2 hours and as needed Activity Interventions: Pressure redistribution bed/mattress(bed type), Increase time out of bed Mobility Interventions: HOB 30 degrees or less, Pressure redistribution bed/mattress (bed type) Nutrition Interventions: Document food/fluid/supplement intake Friction and Shear Interventions: HOB 30 degrees or less Problem: Patient Education: Go to Patient Education Activity Goal: Patient/Family Education Outcome: Progressing Towards Goal 
  
Problem: Urinary Tract Infection - Adult Goal: *Absence of infection signs and symptoms Outcome: Progressing Towards Goal 
  
Problem: Patient Education: Go to Patient Education Activity Goal: Patient/Family Education Outcome: Progressing Towards Goal 
  
Problem: Pain Goal: *Control of Pain Outcome: Progressing Towards Goal 
  
Problem: Patient Education: Go to Patient Education Activity Goal: Patient/Family Education Outcome: Progressing Towards Goal 
  
Problem: Patient Education: Go to Patient Education Activity Goal: Patient/Family Education Outcome: Progressing Towards Goal 
  
Problem: Patient Education: Go to Patient Education Activity Goal: Patient/Family Education Outcome: Progressing Towards Goal

## 2021-05-09 NOTE — PROGRESS NOTES
Unique Hospitalist Progress Note Name:  Erlinda Rubio  Age:80 y.o. Sex:female :  1940 MRN:  [id number] Admit Date:  2021 Reason for Admission: 
Acute diastolic CHF (congestive heart failure) ([facility] [address] [I50.31] Hospital Course/Interval history:  
 
Pt is a 78 y.o. female with a PMHx of HTN, CAD, T2DM, COPD on 2L NC baseline, dHFpEF, pulmonary HTN who was sent from [address] due to [facility]. Upon EMS arrival pt was immediately responsive. Of note, she has a 3-month hospital stay for COVID-19 pneumonia with a stormy course requiring hemodialysis and multiple endotracheal intubations. She was eventually discharged to skilled nursing facility and has been readmitted multiple times recently several weeks ago. In ED, CXR shows vascular congestion and edema with a pro-BNP 2639. K 5.3, and pt received lokelma.  and AST of 71 and alk phos 165. Hgb 8 but is chronic. UA consistent with UTI and she was started on Ceftriaxone She is being admitted with acute on chronic diastolic heart failure and acute metabolic encephalopathy. Subjective (21): Very pleasant and happy this morning she was alert and oriented x3 and following commands well. Happy that is Mother's Day excited about her birthday tomorrow. She continues to denies pain or any discomfort. Denies shortness of breath, chest pain, abdominal pain, nausea or vomiting. Review of Systems: 14 point review of systems is otherwise negative with the exception of the elements mentioned above. Assessment & Plan Acute metabolic encephalopathy, resolved CT head on admission shows no acute intracranial abnormalities. Most likely secondary to UTI Avoid sedating meds Delirium precautions PT/OT/ to eval and treat Infection management as stated Vit B12, folate, ammonia--wnl. TSH 4.0 but Free T4 0.9 at low normal. 
Alert and oriented x4 Acute on chronic dHFpEF exacerbation CXR on admission shows evidence of pulmonary edema with pro-BNP>2K. Recent TTE on 4/2021 shows EF 55 to 65% C/W diastolic dysfunction. Low-salt diet, accurate I&O's, daily weights Change Lasix IV to po d/t slight increase in Cr. Now at home dose Lasix po. Changed metoprolol twice daily to ToprolXL Cardiology consulted, appreciate recs--agree with diuresis. On standby CXR shows slight interval improvement Wean down to 2 L NC this a.m. which is her baseline UTI Previous urine cultures grew E. coli Abx: Rocephin (5/5-5/9) UCx 5/5 >100K Klebsiella BCx 5/5 NGTD Change IV antibiotics to p.o. Vantin to complete 7-day course (5/11/21) Transaminitis  and AST 31 on admission. Hold home statin Acute hepatitis panel unremarkable RUQ ultrasound showing dilated CBD could be d/t biliary ectasia vs obstructing mass or stricture. Recommends MRCP or ERCP. Consulted GI, appreciate recs MRI with MRCP prelim read shows ? stricture findings. Pending final read. No further work-up needed if normal.  If abnormal, may consider ERCP tomorrow. N.p.o. after midnight just in case. COPD On 2 L O2 chronically at baseline Continue home inhalers Constipation MiraLAX daily and Dulcolax daily prn for now Hyperkalemia Cont Lokelma TID for now On SSI as well Holding Losartan. Cont Lasix F/u AM BMP Macrocytic anemia Chronic at baseline (7.7-9.9) CAD status post CABG Continue home DAPT, BB, losartan. Holding statin d/t elevated LFT's 
 
DMII 
SSI w/ BG check qAC/HS 
 
CKD stage III Creatinine at baseline (1.3-2.0) Monitor renal function while on diuretics Diet:  DIET DIABETIC CONSISTENT CARB 
DIET NPO 
DVT PPx: SCD's Code status: DNR Disposition/Expected LOS: Hopefully Monday 5/10 as ARAMIS is working with [facility]. Pending final UCX and change of abx. Also GI recs and if O2 remains stable.   
 
 
Objective:  
 
Patient Vitals for the past 24 hrs: 
 Temp Pulse Resp BP SpO2  
05/09/21 1141 97.7 °F (36.5 °C) 64 16 (!) 165/82 100 % 05/09/21 0749 98.4 °F (36.9 °C) 67 16 (!) 122/57 90 % 05/09/21 0510 97.8 °F (36.6 °C) 66 16 139/71 90 % 05/09/21 0036 97.9 °F (36.6 °C) 62 16 (!) 159/70 90 % 05/08/21 2006     98 % 05/08/21 1905 97.5 °F (36.4 °C) (!) 56 16 121/65 91 % 05/08/21 1530 97.5 °F (36.4 °C) 60 16 122/84 99 % Oxygen Therapy O2 Sat (%): 100 % (05/09/21 1141) Pulse via Oximetry: 49 beats per minute (05/05/21 2051) O2 Device: Nasal cannula (05/08/21 2006) O2 Flow Rate (L/min): 2 l/min (05/08/21 2006) Body mass index is 22.73 kg/m². Physical Exam:  
General:  No acute distress, speaking in full sentences, no use of accessory muscles. Pleasant. Hard of hearing seems to be better. Lungs:  CTA mostly with slight coarse lung sounds at bases CV:  Regular rate and rhythm with normal S1 and S2 Abdomen:  Soft, nontender, nondistended, normoactive bowel sounds Extremities:  No cyanosis clubbing or edema Neuro:   Nonfocal, A&O x3. Follows commands well Psych:  Normal affect Data Review: 
I have reviewed all labs, meds, and studies from the last 24 hours: 
 
Labs: 
 
Recent Results (from the past 24 hour(s)) GLUCOSE, POC Collection Time: 05/08/21  3:37 PM  
Result Value Ref Range Glucose (POC) 123 (H) 65 - 100 mg/dL Performed by [facility]   
GLUCOSE, POC Collection Time: 05/08/21  9:10 PM  
Result Value Ref Range Glucose (POC) 245 (H) 65 - 100 mg/dL Performed by [facility] [employer] CBC W/O DIFF Collection Time: 05/09/21  6:09 AM  
Result Value Ref Range WBC 5.2 4.3 - 11.1 K/uL  
 RBC 2.44 (L) 4.05 - 5.2 M/uL HGB 7.8 (L) 11.7 - 15.4 g/dL HCT 24.9 (L) 35.8 - 46.3 % .0 (H) 79.6 - 97.8 FL  
 MCH 32.0 26.1 - 32.9 PG  
 MCHC 31.3 (L) 31.4 - 35.0 g/dL  
 RDW 15.5 (H) 11.9 - 14.6 % PLATELET 798 (L) 781 - 450 K/uL MPV 11.3 9.4 - 12.3 FL ABSOLUTE NRBC 0.00 0.0 - 0.2 K/uL METABOLIC PANEL, COMPREHENSIVE  Collection Time: 05/09/21  6:09 AM Result Value Ref Range Sodium 143 136 - 145 mmol/L Potassium 4.9 3.5 - 5.1 mmol/L Chloride 108 (H) 98 - 107 mmol/L  
 CO2 31 21 - 32 mmol/L Anion gap 4 (L) 7 - 16 mmol/L Glucose 104 (H) 65 - 100 mg/dL BUN 49 (H) 8 - 23 MG/DL Creatinine 1.62 (H) 0.6 - 1.0 MG/DL  
 GFR est AA 39 (L) >60 ml/min/1.73m2 GFR est non-AA 32 (L) >60 ml/min/1.73m2 Calcium 9.1 8.3 - 10.4 MG/DL Bilirubin, total 0.2 0.2 - 1.1 MG/DL  
 ALT (SGPT) 113 (H) 12 - 65 U/L  
 AST (SGOT) 60 (H) 15 - 37 U/L Alk. phosphatase 136 50 - 136 U/L Protein, total 6.7 6.3 - 8.2 g/dL Albumin 2.7 (L) 3.2 - 4.6 g/dL Globulin 4.0 (H) 2.3 - 3.5 g/dL A-G Ratio 0.7 (L) 1.2 - 3.5 GLUCOSE, POC Collection Time: 05/09/21  6:35 AM  
Result Value Ref Range Glucose (POC) 116 (H) 65 - 100 mg/dL Performed by [facility] [employer] GLUCOSE, POC Collection Time: 05/09/21 11:43 AM  
Result Value Ref Range Glucose (POC) 283 (H) 65 - 100 mg/dL Performed by [facility] [employer] Results Procedure Component Value Units Date/Time CULTURE, BLOOD [[id number]] Collected: 05/05/21 1505 Order Status: Completed Specimen: Blood Updated: 05/09/21 5034 Special Requests: RIGHT FOREARM Culture result: NO GROWTH 4 DAYS     
 CULTURE, BLOOD [[id number]] Collected: 05/05/21 1017 Order Status: Completed Specimen: Blood Updated: 05/09/21 3645 Special Requests: --     
  RIGHT Antecubital 
  
  Culture result: NO GROWTH 3 DAYS     
 CULTURE, URINE [[id number]]  (Abnormal)  (Susceptibility) Collected: 05/05/21 1422 Order Status: Completed Specimen: Urine from Clean catch Updated: 05/08/21 3561 Special Requests: NO SPECIAL REQUESTS Culture result:    
  >100,000 COLONIES/mL KLEBSIELLA PNEUMONIAE  
     
      
  <10,000 COLONIES/mL MIXED SKIN REDD ISOLATED  
     
  
 
 
EKG Results Procedure 720 Value Units Date/Time EKG, 12 LEAD, INITIAL [[id number]] Collected: 05/05/21 1142 Order Status: Completed Updated: 05/05/21 1714 Ventricular Rate 50 BPM   
  Atrial Rate 50 BPM   
  P-R Interval 246 ms   
  QRS Duration 88 ms Q-T Interval 482 ms QTC Calculation (Bezet) 439 ms Calculated P Axis 36 degrees Calculated R Axis 59 degrees Calculated T Axis 109 degrees Diagnosis --  
  Sinus bradycardia with 1st degree A-V block Cannot rule out Anterior infarct (cited on or before 16-APR-2021) T wave abnormality, consider lateral ischemia Abnormal ECG When compared with ECG of 16-APR-2021 12:24, No significant change was found Confirmed by Denny Arambula ([id number]) on 5/5/2021 5:14:17 PM 
  
  
 
 
Other Studies: No results found. Current Meds:  
Current Facility-Administered Medications Medication Dose Route Frequency  cefpodoxime (VANTIN) tablet 200 mg  200 mg Oral Q24H  
 sodium zirconium cyclosilicate (LOKELMA) powder packet 10 g  10 g Oral TID WITH MEALS  traMADoL (ULTRAM) tablet 50 mg  50 mg Oral BID PRN  
 furosemide (LASIX) tablet 40 mg  40 mg Oral DAILY  bisacodyL (DULCOLAX) suppository 10 mg  10 mg Rectal DAILY PRN  
 alcohol 62% (NOZIN) nasal  1 Ampule  1 Ampule Topical Q12H  
 sodium chloride (NS) flush 5-40 mL  5-40 mL IntraVENous Q8H  
 sodium chloride (NS) flush 5-40 mL  5-40 mL IntraVENous PRN  
 acetaminophen (TYLENOL) tablet 650 mg  650 mg Oral Q6H PRN Or  
 acetaminophen (TYLENOL) suppository 650 mg  650 mg Rectal Q6H PRN  polyethylene glycol (MIRALAX) packet 17 g  17 g Oral DAILY PRN  promethazine (PHENERGAN) tablet 12.5 mg  12.5 mg Oral Q6H PRN Or  
 ondansetron (ZOFRAN) injection 4 mg  4 mg IntraVENous Q6H PRN  
 aspirin delayed-release tablet 81 mg  81 mg Oral DAILY  [Held by provider] atorvastatin (LIPITOR) tablet 40 mg  40 mg Oral QHS  clopidogreL (PLAVIX) tablet 75 mg  75 mg Oral DAILY  gabapentin (NEURONTIN) capsule 100 mg  100 mg Oral TID  [Held by provider] losartan (COZAAR) tablet 100 mg  100 mg Oral DAILY  metoprolol succinate (TOPROL-XL) XL tablet 12.5 mg  12.5 mg Oral DAILY  insulin lispro (HUMALOG) injection   SubCUTAneous AC&HS Problem List: 
Hospital Problems as of 5/9/2021 Date Reviewed: 4/19/2021 Codes Class Noted - Resolved POA Constipation ICD-10-CM: K59.00 ICD-9-CM: 564.00  5/6/2021 - Present Unknown Acute diastolic CHF (congestive heart failure) (HCC) ICD-10-CM: I50.31 ICD-9-CM: 428.31, 428.0  5/5/2021 - Present Diastolic CHF, chronic (HCC) ICD-10-CM: I50.32 
ICD-9-CM: 428.32, 428.0  5/5/2021 - Present Pulmonary HTN ([facility] ICD-10-CM: I27.20 ICD-9-CM: 416.8  5/5/2021 - Present Unknown Hyperkalemia ICD-10-CM: E87.5 ICD-9-CM: 276.7  5/5/2021 - Present Unknown UTI (urinary tract infection) ICD-10-CM: N39.0 ICD-9-CM: 599.0  5/5/2021 - Present Unknown Chronic hypoxemic respiratory failure [facility]) ICD-10-CM: J96.11 
ICD-9-CM: 518.83, 799.02  5/5/2021 - Present Unknown Hypercapnemia ICD-10-CM: R06.89 
ICD-9-CM: 786.09  5/5/2021 - Present Unknown Abnormal LFTs ICD-10-CM: R94.5 ICD-9-CM: 790.6  5/5/2021 - Present Unknown * (Principal) [facility] hypoxemic [facility] ICD-10-CM: J96.01 
ICD-9-CM: 518.81  12/24/2020 - Present Yes Metabolic encephalopathy [id number]-77-ZA: G93.41 
ICD-9-CM: 348.31  1/30/2019 - Present Unknown Coronary artery disease involving coronary bypass graft of native heart without angina pectoris (Chronic) ICD-10-CM: R89.330 ICD-9-CM: 414.05  6/15/2015 - Present Yes Uncontrolled type 2 diabetes mellitus with hyperglycemia (HCC) (Chronic) ICD-10-CM: E11.65 ICD-9-CM: 250.02  6/15/2015 - Present Yes Essential hypertension (Chronic) ICD-10-CM: I10 
ICD-9-CM: 401.9  6/15/2015 - Present Yes HLD (hyperlipidemia) (Chronic) ICD-10-CM: L01.7 ICD-9-CM: 272.4  6/15/2015 - Present Yes  Chronic kidney disease, stage III (moderate) (HCC) (Chronic) ICD-10-CM: N18.30 ICD-9-CM: 585.3  6/15/2015 - Present Yes Part of this note was written by using a voice dictation software and the note has been proof read but may still contain some grammatical/other typographical errors. Signed By: DO Krzysztof Muñoz [facility] Service  May 9, 2021  5:15 PM

## 2021-05-10 NOTE — ANESTHESIA PREPROCEDURE EVALUATION
Relevant Problems CARDIOVASCULAR  
(+) CHF exacerbation (HCC)  
(+) Cardiac arrest (HCC)  
(+) Coronary artery disease involving coronary bypass graft of native heart without angina pectoris  
(+) Essential hypertension GASTROINTESTINAL  
(+) Gastroesophageal reflux disease without esophagitis RENAL FAILURE  
(+) Acute on chronic renal failure (HCC)  
(+) Chronic kidney disease, stage III (moderate) (HCC) ENDOCRINE  
(+) Diabetes mellitus with hyperosmolarity without hyperglycemic hyperosmolar nonketotic coma ([facility] [address] HEMATOLOGY  
(+) Anemia Anesthetic History No history of anesthetic complications Review of Systems / Medical History Patient summary reviewed, nursing notes reviewed and pertinent labs reviewed Pulmonary Comments: Covid in Dec 2020- with resultant resp failure- trached Neuro/Psych Within defined limits Comments: Encephalopathy- waxing and waning mental status- follows some commands Cardiovascular Hypertension CAD and cardiac stents Exercise tolerance: <4 METS Comments: TTE  EF 60%, Mild MR  
GI/Hepatic/Renal 
  
GERD Renal disease (recent ARF that required some dialysis- no longer requiring): CRI Endo/Other Diabetes Arthritis and anemia Other Findings Comments: Presented with AMS found to have volume overload and transaminitis Prior extensive Covid hospital course Physical Exam 
 
Airway Mallampati: II 
TM Distance: 4 - 6 cm Neck ROM: normal range of motion Mouth opening: Normal 
 
 Cardiovascular Rhythm: regular Dental 
 
Dentition: Poor dentition Pulmonary Rhonchi:bilateral 
 
 
 
 
 
 Abdominal 
 
 
 
 Other Findings Anesthetic Plan ASA: 3 Anesthesia type: general 
 
 
 
 
Induction: Intravenous Anesthetic plan and risks discussed with: Patient

## 2021-05-10 NOTE — ANESTHESIA POSTPROCEDURE EVALUATION
Procedure(s): ENDOSCOPIC RETROGRADE CHOLANGIOPANCREATOGRAPHY (ERCP) ROOM 719 ENDOSCOPIC SPHINCTEROTOMY 
ENDOSCOPIC STONE EXTRACTION/BALLOON SWEEP. general 
 
Anesthesia Post Evaluation Multimodal analgesia: multimodal analgesia used between 6 hours prior to anesthesia start to PACU discharge Patient location during evaluation: bedside Patient participation: complete - patient participated Level of consciousness: responsive to verbal stimuli Pain management: adequate Airway patency: patent Anesthetic complications: no 
Cardiovascular status: hemodynamically stable Respiratory status: spontaneous ventilation Hydration status: stable Final Post Anesthesia Temperature Assessment:  Normothermia (36.0-37.5 degrees C) INITIAL Post-op Vital signs:  
Vitals Value Taken Time /77 05/10/21 1348 Temp 36.2 °C (97.1 °F) 05/10/21 1329 Pulse 66 05/10/21 1352 Resp 13 05/10/21 1329 SpO2 100 % 05/10/21 1352 Vitals shown include unvalidated device data.

## 2021-05-10 NOTE — PERIOP NOTES
TRANSFER - OUT REPORT: 
 
Verbal report given to Ezequiel Gusman RN on Eze Claros  being transferred to [id number] for routine post - op Report consisted of patients Situation, Background, Assessment and  
Recommendations(SBAR). Information from the following report(s) SBAR, Intake/Output, MAR and Cardiac Rhythm nsr was reviewed with the receiving nurse. Lines:  
Peripheral IV 05/05/21 Left Hand (Active) Site Assessment Clean, dry, & intact 05/10/21 1328 Phlebitis Assessment 0 05/10/21 1328 Infiltration Assessment 0 05/10/21 1328 Dressing Status Clean, dry, & intact 05/10/21 1328 Dressing Type Transparent;Tape 05/10/21 1328 Hub Color/Line Status Patent; Infusing 05/10/21 1328 Alcohol Cap Used No 05/10/21 1328 Opportunity for questions and clarification was provided. Patient transported with: 
 O2 @ 3 liters Tech 
 
VTE prophylaxis orders have been written for Eze Claros. Patient and family given floor number and nurses name. Family updated re: pt status after security code verified.

## 2021-05-10 NOTE — PROGRESS NOTES
TRANSFER - IN REPORT: 
 
Verbal report received from Mahin N Chaitanya on Genell Gilford  being received from PACU for routine progression of care Report consisted of patients Situation, Background, Assessment and  
Recommendations(SBAR). Information from the following report(s) SBAR was reviewed with the receiving nurse. Opportunity for questions and clarification was provided. Assessment completed upon patients arrival to unit and care assumed.

## 2021-05-10 NOTE — PROGRESS NOTES
Physical Therapy Note: 
 
Patient not seen this AM secondary to being off of the floor for ERCP. Will follow and re-attempt when patient available / medically appropriate. Thank you, Daren Castano, PT, DPT 
5/10/2021

## 2021-05-10 NOTE — PROCEDURES
ERCP Procedure Note Indications: Elevated LFT's, MRCP showing dilated bile duct and pancreatic duct Anesthesia/Sedation: General anesthesia Pre-Procedure Physical: 
 
Current Facility-Administered Medications Medication Dose Route Frequency  indomethacin (INDOCIN) rectal suppository 100 mg  100 mg Rectal ENDO ONCE  
 glucagon (GLUCAGEN) injection 0.5-1 mg  0.5-1 mg IntraVENous Multiple  lactated Ringers infusion 1,000 mL  1,000 mL IntraVENous CONTINUOUS  
 cefpodoxime (VANTIN) tablet 200 mg  200 mg Oral Q24H  
 traMADoL (ULTRAM) tablet 50 mg  50 mg Oral BID PRN  
 furosemide (LASIX) tablet 40 mg  40 mg Oral DAILY  bisacodyL (DULCOLAX) suppository 10 mg  10 mg Rectal DAILY PRN  
 alcohol 62% (NOZIN) nasal  1 Ampule  1 Ampule Topical Q12H  
 sodium chloride (NS) flush 5-40 mL  5-40 mL IntraVENous Q8H  
 sodium chloride (NS) flush 5-40 mL  5-40 mL IntraVENous PRN  
 acetaminophen (TYLENOL) tablet 650 mg  650 mg Oral Q6H PRN Or  
 acetaminophen (TYLENOL) suppository 650 mg  650 mg Rectal Q6H PRN  polyethylene glycol (MIRALAX) packet 17 g  17 g Oral DAILY PRN  promethazine (PHENERGAN) tablet 12.5 mg  12.5 mg Oral Q6H PRN Or  
 ondansetron (ZOFRAN) injection 4 mg  4 mg IntraVENous Q6H PRN  
 aspirin delayed-release tablet 81 mg  81 mg Oral DAILY  [Held by provider] atorvastatin (LIPITOR) tablet 40 mg  40 mg Oral QHS  clopidogreL (PLAVIX) tablet 75 mg  75 mg Oral DAILY  gabapentin (NEURONTIN) capsule 100 mg  100 mg Oral TID  [Held by provider] losartan (COZAAR) tablet 100 mg  100 mg Oral DAILY  metoprolol succinate (TOPROL-XL) XL tablet 12.5 mg  12.5 mg Oral DAILY  insulin lispro (HUMALOG) injection   SubCUTAneous AC&HS Facility-Administered Medications Ordered in Other Encounters Medication Dose Route Frequency  fentaNYL citrate (PF) injection   IntraVENous PRN  propofoL (DIPRIVAN) 10 mg/mL injection   IntraVENous PRN  
 lidocaine (PF) (XYLOCAINE) 20 mg/mL (2 %) injection   IntraVENous PRN  
 succinylcholine (ANECTINE) injection    PRN  
 ePHEDrine in NS (PF) (MISTOLE) 10 mg/mL in NS syringe   IntraVENous PRN  
 PHENYLephrine 100 mcg/mL 10 mL syringe (ONE-STEP)   IntraVENous CONTINUOUS  
 lactated Ringers infusion    CONTINUOUS  
 ondansetron (ZOFRAN) injection    PRN  
 dexamethasone (DECADRON) 4 mg/mL injection    PRN Sulfa (sulfonamide antibiotics) Patient Vitals for the past 8 hrs: 
 BP Temp Pulse Resp SpO2 Height Weight 05/10/21 1152 (!) 141/65  (!) 50 18 100 % 5' 5\" (1.651 m) 61.7 kg (136 lb) 05/10/21 0745 (!) 147/80 97.6 °F (36.4 °C) 61 18 99 %    
05/10/21 0545       62.1 kg (136 lb 14.4 oz) Exam 
   
Airway: clear Heart: normal S1and S2 Lungs: clear bilateral 
Abdomen: soft, nontender, bowel sounds present and normal in all quads Mental Status: awake, alert and oriented to person, place and time Procedure Details Informed consent was obtained for the procedure, including conscious sedation. Risks of pancreatitis, infection, perforation, hemorrhage, adverse drug reaction and aspiration were discussed. The patient was placed in the prone position. Based on the pre-procedure assessment, including review of the patient's medical history, medications, allergies, and review of systems, she had been deemed to be an appropriate candidate for conscious sedation; she was therefore sedated with the medications listed below. She was monitored continuously with ECG tracing, pulse oximetry, blood pressure monitoring, and direct observation. The side viewing duodenoscope was inserted through the mouth and advanced to the major duodenal papilla. The common bile duct was selectively cannulated with the 44 sphinctertome over a 125 jag wire. Cholangiogram taken. Sphincterotomy was performed. Balloon sweep of the bile duct was performed with 9-12 and 12-15 mm balloon.   
 
 
Findings: Esophagus- Normal. 
Stomach- Normal. 
Duodenum- Normal. 
 
Major duodenal papilla- Normal. 
Common bile duct- Dilated to 15 mm. No obvious stricture. Intrahepatic ducts- Normal. 
Pancreatic duct- Not entered. Gallbladder- Not visualized. Therapies: Sphincterotomy Specimens: None Estimated Blood Loss: 0 cc Complications:   None; patient tolerated the procedure well. Attending Attestation:  I performed the procedure. Impression:   
Very dilated bile duct to 15 mm. No obvious biliary stricture. Sphincterotomy performed. Recommendations: Outpt EUS to rule out pancreas cancer given double duct sign on MRCP.

## 2021-05-10 NOTE — PROGRESS NOTES
Problem: Falls - Risk of 
Goal: *Absence of Falls Description: Document Maverick Rondonle Fall Risk and appropriate interventions in the flowsheet. Outcome: Progressing Towards Goal 
Note: Fall Risk Interventions: 
Mobility Interventions: Bed/chair exit alarm, Patient to call before getting OOB Medication Interventions: Patient to call before getting OOB, Bed/chair exit alarm Elimination Interventions: Bed/chair exit alarm, Call light in reach History of Falls Interventions: Bed/chair exit alarm, Door open when patient unattended Problem: Patient Education: Go to Patient Education Activity Goal: Patient/Family Education Outcome: Progressing Towards Goal 
  
Problem: Pressure Injury - Risk of 
Goal: *Prevention of pressure injury Description: Document Ashu Scale and appropriate interventions in the flowsheet. Outcome: Progressing Towards Goal 
Note: Pressure Injury Interventions: 
Sensory Interventions: Assess changes in LOC Moisture Interventions: Absorbent underpads, Internal/External urinary devices Activity Interventions: Increase time out of bed, Pressure redistribution bed/mattress(bed type) Mobility Interventions: Pressure redistribution bed/mattress (bed type), HOB 30 degrees or less, PT/OT evaluation Nutrition Interventions: Document food/fluid/supplement intake Friction and Shear Interventions: Apply protective barrier, creams and emollients, HOB 30 degrees or less Problem: Patient Education: Go to Patient Education Activity Goal: Patient/Family Education Outcome: Progressing Towards Goal 
  
Problem: Urinary Tract Infection - Adult Goal: *Absence of infection signs and symptoms Outcome: Progressing Towards Goal 
  
Problem: Patient Education: Go to Patient Education Activity Goal: Patient/Family Education Outcome: Progressing Towards Goal 
  
Problem: Pain Goal: *Control of Pain Outcome: Progressing Towards Goal 
  
Problem: Patient Education: Go to Patient Education Activity Goal: Patient/Family Education Outcome: Progressing Towards Goal 
  
Problem: Patient Education: Go to Patient Education Activity Goal: Patient/Family Education Outcome: Progressing Towards Goal 
  
Problem: Patient Education: Go to Patient Education Activity Goal: Patient/Family Education Outcome: Progressing Towards Goal

## 2021-05-10 NOTE — PROGRESS NOTES
GI DAILY PROGRESS NOTE Admit Date:  5/5/2021 Today's Date:  5/10/2021 CC:  Elevated LFTs, Abnormal U/S Subjective:  
 
Patient feeling well, no abdominal pain. Breathing at baseline. On 1L O2 via NC. No nausea or vomiting. Medications:  
Current Facility-Administered Medications Medication Dose Route Frequency  cefpodoxime (VANTIN) tablet 200 mg  200 mg Oral Q24H  
 traMADoL (ULTRAM) tablet 50 mg  50 mg Oral BID PRN  
 furosemide (LASIX) tablet 40 mg  40 mg Oral DAILY  bisacodyL (DULCOLAX) suppository 10 mg  10 mg Rectal DAILY PRN  
 alcohol 62% (NOZIN) nasal  1 Ampule  1 Ampule Topical Q12H  
 sodium chloride (NS) flush 5-40 mL  5-40 mL IntraVENous Q8H  
 sodium chloride (NS) flush 5-40 mL  5-40 mL IntraVENous PRN  
 acetaminophen (TYLENOL) tablet 650 mg  650 mg Oral Q6H PRN Or  
 acetaminophen (TYLENOL) suppository 650 mg  650 mg Rectal Q6H PRN  polyethylene glycol (MIRALAX) packet 17 g  17 g Oral DAILY PRN  promethazine (PHENERGAN) tablet 12.5 mg  12.5 mg Oral Q6H PRN Or  
 ondansetron (ZOFRAN) injection 4 mg  4 mg IntraVENous Q6H PRN  
 aspirin delayed-release tablet 81 mg  81 mg Oral DAILY  [Held by provider] atorvastatin (LIPITOR) tablet 40 mg  40 mg Oral QHS  clopidogreL (PLAVIX) tablet 75 mg  75 mg Oral DAILY  gabapentin (NEURONTIN) capsule 100 mg  100 mg Oral TID  [Held by provider] losartan (COZAAR) tablet 100 mg  100 mg Oral DAILY  metoprolol succinate (TOPROL-XL) XL tablet 12.5 mg  12.5 mg Oral DAILY  insulin lispro (HUMALOG) injection   SubCUTAneous AC&HS Review of Systems: 
No chest pain or SOB. Does complain of chronic intermittnet right leg pain Objective:  
Vitals: 
Visit Vitals BP (!) 147/80 (BP 1 Location: Left upper arm, BP Patient Position: At rest) Pulse 61 Temp 97.6 °F (36.4 °C) Resp 18 Ht 5' 5\" (1.651 m) Wt 62.1 kg (136 lb 14.4 oz) SpO2 99% BMI 22.78 kg/m² Intake/Output: 
No intake/output data recorded. 05/08 1901 - 05/10 0700 In: -  
Out: [address] E Exam: 
General appearance: alert, cooperative, no distress Lungs: mild bilateral crackles. On o2 via NC @ 1L/m Heart: regular rate and rhythm Abdomen: soft, non-tender. Bowel sounds normal. No masses,  no organomegaly Extremities: extremities normal, atraumatic, no cyanosis or edema Neuro:  Alert and oriented without obvious neurological deficits. Data Review (Labs):   
Recent Labs 05/10/21 
7204 05/09/21 
7379 05/08/21 
0577 05/07/21 
2026 05/07/21 
1214 WBC 4.9 5.2 6.1  --   --   
HGB 7.7* 7.8* 8.0*  --   --   
HCT 24.8* 24.9* 25.1*  --   --   
* 140* 142*  --   --   
.8* 102.0* 101.2*  --   --   
 143 143  --   --   
K 4.6 4.9 5.6* 5.8* 6.0*  
* 108* 110*  --   --   
CO2 33* 31 30  --   --   
BUN 51* 49* 50*  --   --   
CREA 1.56* 1.62* 1.46*  --   --   
CA 8.9 9.1 8.9  --   --   
* 104* 89  --   --   
* 136 146*  --   --   
ALT 99* 113* 116*  --   --   
TBILI 0.3 0.2 0.2  --   -- MRI ABD W WO CONTRAST 5/8/21 FINDINGS: 
- LIVER: Normal in size and appearance. - GALLBLADDER/BILE DUCTS: Postcholecystectomy. There is moderate bile duct 
dilatation. Common duct measures 15 mm. No discrete stricture or stone is 
seen. - PANCREAS: Mild pancreatic duct dilatation, 5 mm. No discrete pancreas mass is 
no evidence of acute pancreas inflammation. 
- SPLEEN: Normal. 
- ADRENALS: Normal. 
- KIDNEYS/URETERS: No hydronephrosis or significant mass. 
- LYMPH NODES: No significant retroperitoneal or mesenteric adenopathy. 
- BONES: No fracture or significant bone lesion. 
- OTHER: No significant ascites. 
  
IMPRESSION Moderate bile duct dilatation. No obstructing stone or mass is 
seen. Assessment:  
 
Principal Problem: 
  Acute hypoxemic respiratory failure ([facility] [address] (12/24/2020) Active Problems: 
  Coronary artery disease involving coronary bypass graft of native heart without angina pectoris (6/15/2015) Uncontrolled type 2 diabetes mellitus with hyperglycemia ([address] (6/15/2015) Essential hypertension (6/15/2015) HLD (hyperlipidemia) (6/15/2015) Chronic kidney disease, stage III (moderate) (HCC) (6/15/2015) Metabolic encephalopathy (2/39/4277) Pulmonary HTN ([address] (5/5/2021) Hyperkalemia (5/5/2021) UTI (urinary tract infection) (5/5/2021) Chronic hypoxemic respiratory failure ([address] (5/5/2021) Hypercapnemia (5/5/2021) Abnormal LFTs (5/5/2021) Constipation (5/6/2021) Plan:  
 
 
  
Pleasant 79yo AAF with CAD s/p CABG on plavix, pulmonary HTN, CKD stage III, DM type2, and chronic respiratory failure  presenting with CHF and UTI, found to have elevated LFTs and abnormal U/S. Her LFTs could be elevated due to hepatic congestion from her CHF and dilated CBD could be due to hx of CCY. However, there is possible PD dilation as well (?double duct sign?) which raises the possibility of a stricture or malignancy. MRI showed 15 mm CBD and 5 mm PD without obvious stricture, stone or mass. Currently, she states she feels well and LFTs improving. -ERCP today with Dr. Fidencio Wilson. Her last Plavix was 5/9/21 and he is aware. Discussed procedure and risks including bleeding, perforation, reaction to sedation and up to 5% risk of pancreatitis with patient and her daughter, Cruz Powell (via phone).  
SLIME Pascual

## 2021-05-10 NOTE — PROGRESS NOTES
Unique Hospitalist Progress Note Name:  Eze Claros  Age:81 y.o. Sex:female :  1940 MRN:  [id number] Admit Date:  2021 Reason for Admission: 
Acute diastolic CHF (congestive heart failure) ([facility] [address] [I50.31] Hospital Course/Interval history:  
 
Pt is a 78 y.o. female with a PMHx of HTN, CAD, T2DM, COPD on 2L NC baseline, dHFpEF, pulmonary HTN who was sent from [address] due to [facility]. Upon EMS arrival pt was immediately responsive. Of note, she has a 3-month hospital stay for COVID-19 pneumonia with a stormy course requiring hemodialysis and multiple endotracheal intubations. She was eventually discharged to skilled nursing facility and has been readmitted multiple times recently several weeks ago. In ED, CXR shows vascular congestion and edema with a pro-BNP 2639. K 5.3, and pt received lokelma.  and AST of 71 and alk phos 165. Hgb 8 but is chronic. UA consistent with UTI and she was started on Ceftriaxone She is being admitted with acute on chronic diastolic heart failure and acute metabolic encephalopathy secondary to UTI. CT head on admission was unremarkable. Her mentation improves to baseline with UTI treatment. Cardiology was consulted for her heart failure. She tolerated diuresing well and was able to be weaned down to her home baseline 2 L NC O2. She was also found to have transaminitis on admission. Her home statin was held. Acute hepatitis panel was unremarkable. RUQ ultrasound showing dilated CBD could be d/t biliary ectasia vs obstructing mass or stricture. Recommends MRCP or ERCP. GI was consulted. MRI with MRCP shows moderate bile duct dilation but no obstructing stone or mass. Patient was planned for ERCP 5/10. Subjective (05/10/21): This a.m. patient was eager to go back to her facility. She reports no abdominal pain. She was alert and oriented x3. Today is her birthday. She continues to denies pain or any discomfort. Denies shortness of breath, chest pain, abdominal pain, nausea or vomiting. Review of Systems: 14 point review of systems is otherwise negative with the exception of the elements mentioned above. Assessment & Plan Acute metabolic encephalopathy, resolved CT head on admission shows no acute intracranial abnormalities. Most likely secondary to UTI Avoid sedating meds Delirium precautions PT/OT/ to eval and treat Infection management as stated Vit B12, folate, ammonia--wnl. TSH 4.0 but Free T4 0.9 at low normal. 
Alert and oriented x4 Acute on chronic dHFpEF exacerbation CXR on admission shows evidence of pulmonary edema with pro-BNP>2K. Recent TTE on 4/2021 shows EF 55 to 99% C/W diastolic dysfunction. Low-salt diet, accurate I&O's, daily weights Change Lasix IV to po d/t slight increase in Cr. Now at home dose Lasix po. Changed metoprolol twice daily to ToprolXL Cardiology consulted, appreciate recs--agree with diuresis. On standby CXR shows slight interval improvement Wean down to 2 L NC this a.m. which is her baseline UTI Previous urine cultures grew E. coli Abx: Rocephin (5/5-5/9) UCx 5/5 >100K Klebsiella BCx 5/5 NGTD On Vantin to complete 7-day course (EOT 5/11/21) Transaminitis, improving  and AST 31 on admission. Hold home statin Acute hepatitis panel unremarkable RUQ ultrasound showing dilated CBD could be d/t biliary ectasia vs obstructing mass or stricture. Recommends MRCP or ERCP. Consulted GI, appreciate recs MRI with MRCP shows moderate bile duct dilation but no obstructing stone or mass ERCP this a.m. along with sphincterotomy with GI showing very dilated bile duct to 15 mm without biliary stricture. Recommend outpatient EUS to rule out pancreas cancer given double duct sign on MRCP 
 
COPD On 2 L O2 chronically at baseline Continue home inhalers Constipation MiraLAX daily and Dulcolax daily prn for now Hyperkalemia, resolved Completed Oumar Thapa On SSI as well Holding Losartan. Cont home po Lasix F/u AM BMP Macrocytic anemia Chronic at baseline (7.7-9.9) CAD status post CABG Continue home DAPT, BB, losartan. Holding statin d/t elevated LFT's 
 
DMII 
SSI w/ BG check qAC/HS 
 
CKD stage III Creatinine at baseline (1.3-2.0) Diet:  DIET FULL LIQUID DVT PPx: SCD's Code status: DNR Disposition/Expected LOS: Anticipate DC in a.m. if remains stable. Objective:  
 
Patient Vitals for the past 24 hrs: 
 Temp Pulse Resp BP SpO2  
05/10/21 1403  66 18 (!) 160/82 100 % 05/10/21 1358 97.3 °F (36.3 °C) 68 18 (!) 160/85 100 % 05/10/21 1353  69 18 (!) 149/80 99 % 05/10/21 1348  65 16 (!) 150/77 100 % 05/10/21 1343  66 16 (!) 151/82 100 % 05/10/21 1338  65 14 (!) 149/80 100 % 05/10/21 1333  66 14 (!) 149/74 100 % 05/10/21 1329 97.1 °F (36.2 °C) 83 13 (!) 150/72 99 % 05/10/21 1328 97.1 °F (36.2 °C) 70 14 (!) 150/72 99 % 05/10/21 1152  (!) 50 18 (!) 141/65 100 % 05/10/21 0745 97.6 °F (36.4 °C) 61 18 (!) 147/80 99 % 05/10/21 0352 97.6 °F (36.4 °C) (!) 59 18 (!) 158/81 99 % 05/10/21 0016 97.7 °F (36.5 °C) (!) 57 20 (!) 154/69 98 % 05/09/21 2031 97.5 °F (36.4 °C) 62 15 137/66 95 % 05/09/21 1606 97.4 °F (36.3 °C) 63 16 (!) 159/71 97 % Oxygen Therapy O2 Sat (%): 100 % (05/10/21 1403) Pulse via Oximetry: 67 beats per minute (05/10/21 1403) O2 Device: Nasal cannula (05/10/21 1358) O2 Flow Rate (L/min): 3 l/min (05/10/21 1358) Body mass index is 22.63 kg/m². Physical Exam:  
General:  No acute distress, speaking in full sentences, no use of accessory muscles. Pleasant. Doing well. Lungs:  CTA mostly with slight coarse lung sounds at bases CV:  Regular rate and rhythm with normal S1 and S2 Abdomen:  Soft, nontender, nondistended, normoactive bowel sounds Extremities:  No cyanosis clubbing or edema Neuro:   Nonfocal, A&O x3. Follows commands well Psych:  Normal affect Data Review: 
I have reviewed all labs, meds, and studies from the last 24 hours: 
 
Labs: 
 
Recent Results (from the past 24 hour(s)) GLUCOSE, POC Collection Time: 05/09/21  4:05 PM  
Result Value Ref Range Glucose (POC) 229 (H) 65 - 100 mg/dL Performed by Alirio GLUCOSE, POC Collection Time: 05/09/21  8:33 PM  
Result Value Ref Range Glucose (POC) 312 (H) 65 - 100 mg/dL Performed by Christiano   
CBC W/O DIFF Collection Time: 05/10/21  6:28 AM  
Result Value Ref Range WBC 4.9 4.3 - 11.1 K/uL  
 RBC 2.39 (L) 4.05 - 5.2 M/uL HGB 7.7 (L) 11.7 - 15.4 g/dL HCT 24.8 (L) 35.8 - 46.3 % .8 (H) 79.6 - 97.8 FL  
 MCH 32.2 26.1 - 32.9 PG  
 MCHC 31.0 (L) 31.4 - 35.0 g/dL  
 RDW 15.1 (H) 11.9 - 14.6 % PLATELET 135 (L) 659 - 450 K/uL MPV 11.4 9.4 - 12.3 FL ABSOLUTE NRBC 0.00 0.0 - 0.2 K/uL METABOLIC PANEL, COMPREHENSIVE Collection Time: 05/10/21  6:28 AM  
Result Value Ref Range Sodium 145 136 - 145 mmol/L Potassium 4.6 3.5 - 5.1 mmol/L Chloride 110 (H) 98 - 107 mmol/L  
 CO2 33 (H) 21 - 32 mmol/L Anion gap 2 (L) 7 - 16 mmol/L Glucose 126 (H) 65 - 100 mg/dL BUN 51 (H) 8 - 23 MG/DL Creatinine 1.56 (H) 0.6 - 1.0 MG/DL  
 GFR est AA 41 (L) >60 ml/min/1.73m2 GFR est non-AA 34 (L) >60 ml/min/1.73m2 Calcium 8.9 8.3 - 10.4 MG/DL Bilirubin, total 0.3 0.2 - 1.1 MG/DL  
 ALT (SGPT) 99 (H) 12 - 65 U/L  
 AST (SGOT) 42 (H) 15 - 37 U/L Alk. phosphatase 138 (H) 50 - 136 U/L Protein, total 6.6 6.3 - 8.2 g/dL Albumin 2.8 (L) 3.2 - 4.6 g/dL Globulin 3.8 (H) 2.3 - 3.5 g/dL A-G Ratio 0.7 (L) 1.2 - 3.5 GLUCOSE, POC Collection Time: 05/10/21  6:52 AM  
Result Value Ref Range Glucose (POC) 143 (H) 65 - 100 mg/dL Performed by Christiano   
GLUCOSE, POC Collection Time: 05/10/21 10:56 AM  
Result Value Ref Range Glucose (POC) 166 (H) 65 - 100 mg/dL Performed by Daniel Rx [employer] Results  Procedure Component Value Units Date/Time CULTURE, BLOOD [[id number]] Collected: 05/05/21 1505 Order Status: Completed Specimen: Blood Updated: 05/10/21 1960 Special Requests: RIGHT FOREARM Culture result: NO GROWTH 5 DAYS     
 CULTURE, BLOOD [[id number]] Collected: 05/05/21 2237 Order Status: Completed Specimen: Blood Updated: 05/10/21 5953 Special Requests: --     
  RIGHT Antecubital 
  
  Culture result: NO GROWTH 4 DAYS     
 CULTURE, URINE [[id number]]  (Abnormal)  (Susceptibility) Collected: 05/05/21 1422 Order Status: Completed Specimen: Urine from Clean catch Updated: 05/08/21 2075 Special Requests: NO SPECIAL REQUESTS Culture result:    
  >100,000 COLONIES/mL KLEBSIELLA PNEUMONIAE  
     
      
  <10,000 COLONIES/mL MIXED SKIN REDD ISOLATED  
     
  
 
 
EKG Results Procedure 720 Value Units Date/Time EKG, 12 LEAD, INITIAL [[id number]] Collected: 05/05/21 1142 Order Status: Completed Updated: 05/05/21 1714 Ventricular Rate 50 BPM   
  Atrial Rate 50 BPM   
  P-R Interval 246 ms   
  QRS Duration 88 ms Q-T Interval 482 ms QTC Calculation (Bezet) 439 ms Calculated P Axis 36 degrees Calculated R Axis 59 degrees Calculated T Axis 109 degrees Diagnosis --  
  Sinus bradycardia with 1st degree A-V block Cannot rule out Anterior infarct (cited on or before 16-APR-2021) T wave abnormality, consider lateral ischemia Abnormal ECG When compared with ECG of 16-APR-2021 12:24, No significant change was found Confirmed by Italia Rossi ([id number]) on 5/5/2021 5:14:17 PM 
  
  
 
 
Other Studies: Xr Ercp / Ercb Combined Result Date: 5/10/2021 ERCP HISTORY: Dilated bile duct TECHNIQUE AND FINDINGS: 2.6 minutes of fluoroscopy time was utilized. 6 spot fluoroscopic images were saved. Initial image demonstrates injection of the dilated common bile duct with reflux of contrast into mildly distended intrahepatic bile ducts.  Per the procedure note, a sphincterotomy was performed. Dilated extra hepatic bile ducts. Current Meds:  
Current Facility-Administered Medications Medication Dose Route Frequency  cefpodoxime (VANTIN) tablet 200 mg  200 mg Oral Q24H  
 traMADoL (ULTRAM) tablet 50 mg  50 mg Oral BID PRN  
 furosemide (LASIX) tablet 40 mg  40 mg Oral DAILY  bisacodyL (DULCOLAX) suppository 10 mg  10 mg Rectal DAILY PRN  
 alcohol 62% (NOZIN) nasal  1 Ampule  1 Ampule Topical Q12H  
 sodium chloride (NS) flush 5-40 mL  5-40 mL IntraVENous Q8H  
 sodium chloride (NS) flush 5-40 mL  5-40 mL IntraVENous PRN  
 acetaminophen (TYLENOL) tablet 650 mg  650 mg Oral Q6H PRN Or  
 acetaminophen (TYLENOL) suppository 650 mg  650 mg Rectal Q6H PRN  polyethylene glycol (MIRALAX) packet 17 g  17 g Oral DAILY PRN  promethazine (PHENERGAN) tablet 12.5 mg  12.5 mg Oral Q6H PRN Or  
 ondansetron (ZOFRAN) injection 4 mg  4 mg IntraVENous Q6H PRN  
 aspirin delayed-release tablet 81 mg  81 mg Oral DAILY  [Held by provider] atorvastatin (LIPITOR) tablet 40 mg  40 mg Oral QHS  clopidogreL (PLAVIX) tablet 75 mg  75 mg Oral DAILY  gabapentin (NEURONTIN) capsule 100 mg  100 mg Oral TID  [Held by provider] losartan (COZAAR) tablet 100 mg  100 mg Oral DAILY  metoprolol succinate (TOPROL-XL) XL tablet 12.5 mg  12.5 mg Oral DAILY  insulin lispro (HUMALOG) injection   SubCUTAneous AC&HS Problem List: 
Hospital Problems as of 5/10/2021 Date Reviewed: 4/19/2021 Codes Class Noted - Resolved POA Constipation ICD-10-CM: K59.00 ICD-9-CM: 564.00  5/6/2021 - Present Unknown Acute diastolic CHF (congestive heart failure) (HCC) ICD-10-CM: I50.31 ICD-9-CM: 428.31, 428.0  5/5/2021 - Present Diastolic CHF, chronic (HCC) ICD-10-CM: I50.32 
ICD-9-CM: 428.32, 428.0  5/5/2021 - Present Pulmonary HTN ([facility] ICD-10-CM: I27.20 ICD-9-CM: 416.8  5/5/2021 - Present Unknown  Hyperkalemia ICD-10-CM: E87.5 ICD-9-CM: 276.7  5/5/2021 - Present Unknown UTI (urinary tract infection) ICD-10-CM: N39.0 ICD-9-CM: 599.0  5/5/2021 - Present Unknown Chronic hypoxemic respiratory failure [facility]) ICD-10-CM: J96.11 
ICD-9-CM: 518.83, 799.02  5/5/2021 - Present Unknown Hypercapnemia ICD-10-CM: R06.89 
ICD-9-CM: 786.09  5/5/2021 - Present Unknown Abnormal LFTs ICD-10-CM: R94.5 ICD-9-CM: 790.6  5/5/2021 - Present Unknown * (Principal) Acute hypoxemic respiratory failure ([facility] [address] ICD-10-CM: J96.01 
ICD-9-CM: 518.81  12/24/2020 - Present Yes Metabolic encephalopathy CHARLES-94-NG: G93.41 
ICD-9-CM: 348.31  1/30/2019 - Present Unknown Coronary artery disease involving coronary bypass graft of native heart without angina pectoris (Chronic) ICD-10-CM: O62.163 ICD-9-CM: 414.05  6/15/2015 - Present Yes Uncontrolled type 2 diabetes mellitus with hyperglycemia (HCC) (Chronic) ICD-10-CM: E11.65 ICD-9-CM: 250.02  6/15/2015 - Present Yes Essential hypertension (Chronic) ICD-10-CM: I10 
ICD-9-CM: 401.9  6/15/2015 - Present Yes HLD (hyperlipidemia) (Chronic) ICD-10-CM: X56.2 ICD-9-CM: 272.4  6/15/2015 - Present Yes Chronic kidney disease, stage III (moderate) (HCC) (Chronic) ICD-10-CM: N18.30 ICD-9-CM: 585.3  6/15/2015 - Present Yes Part of this note was written by using a voice dictation software and the note has been proof read but may still contain some grammatical/other typographical errors. Signed By: DO Krzysztof Ngo Service  May 10, 2021  5:15 PM

## 2021-05-10 NOTE — PROGRESS NOTES
's follow-up visit attempted. Ms. Luanne Raza was not in there room. I provided a card on the bedside table to recognize her birthday today. Marisela Larkin MDiv Board Certified [employer]

## 2021-05-10 NOTE — PROGRESS NOTES
Problem: Falls - Risk of 
Goal: *Absence of Falls Description: Document Lady Montoya Fall Risk and appropriate interventions in the flowsheet. Outcome: Progressing Towards Goal 
Note: Fall Risk Interventions: 
Mobility Interventions: Bed/chair exit alarm, OT consult for ADLs, Patient to call before getting OOB, PT Consult for mobility concerns Medication Interventions: Bed/chair exit alarm, Patient to call before getting OOB, Teach patient to arise slowly Elimination Interventions: Bed/chair exit alarm, Call light in reach, Patient to call for help with toileting needs, Toileting schedule/hourly rounds History of Falls Interventions: Bed/chair exit alarm, Consult care management for discharge planning, Door open when patient unattended Problem: Patient Education: Go to Patient Education Activity Goal: Patient/Family Education Outcome: Progressing Towards Goal 
  
Problem: Pressure Injury - Risk of 
Goal: *Prevention of pressure injury Description: Document Ashu Scale and appropriate interventions in the flowsheet. Outcome: Progressing Towards Goal 
Note: Pressure Injury Interventions: 
Sensory Interventions: Assess changes in LOC, Check visual cues for pain Moisture Interventions: Absorbent underpads, Check for incontinence Q2 hours and as needed, Internal/External urinary devices Activity Interventions: Increase time out of bed, PT/OT evaluation Mobility Interventions: Float heels, PT/OT evaluation, Suspension boots Nutrition Interventions: Document food/fluid/supplement intake, Discuss nutritional consult with provider, Offer support with meals,snacks and hydration Friction and Shear Interventions: Apply protective barrier, creams and emollients, Foam dressings/transparent film/skin sealants Problem: Patient Education: Go to Patient Education Activity Goal: Patient/Family Education Outcome: Progressing Towards Goal 
  
Problem: Urinary Tract Infection - Adult Goal: *Absence of infection signs and symptoms Outcome: Progressing Towards Goal 
  
Problem: Patient Education: Go to Patient Education Activity Goal: Patient/Family Education Outcome: Progressing Towards Goal 
  
Problem: Pain Goal: *Control of Pain Outcome: Progressing Towards Goal 
  
Problem: Patient Education: Go to Patient Education Activity Goal: Patient/Family Education Outcome: Progressing Towards Goal 
  
Problem: Patient Education: Go to Patient Education Activity Goal: Patient/Family Education Outcome: Progressing Towards Goal 
  
Problem: Patient Education: Go to Patient Education Activity Goal: Patient/Family Education Outcome: Progressing Towards Goal

## 2021-05-10 NOTE — PROGRESS NOTES
. 
[facility] Progress Note Date: 5/10/2021 GI Problem: dilated bile duct and pancreatic duct History of Present Illness:  Patient is a 78 y.o. female who is seen in consultation for dilated bile duct and pancreatic duct on mrcp. Hospital Medications: 
Current Facility-Administered Medications Medication Dose Route Frequency  indomethacin (INDOCIN) rectal suppository 100 mg  100 mg Rectal ENDO ONCE  
 iopamidoL (ISOVUE-370) 76 % injection 50 mL  50 mL IntraBILiary ONCE  
 glucagon (GLUCAGEN) injection 0.5-1 mg  0.5-1 mg IntraVENous Multiple  lactated Ringers infusion 1,000 mL  1,000 mL IntraVENous CONTINUOUS  
 cefpodoxime (VANTIN) tablet 200 mg  200 mg Oral Q24H  
 traMADoL (ULTRAM) tablet 50 mg  50 mg Oral BID PRN  
 furosemide (LASIX) tablet 40 mg  40 mg Oral DAILY  bisacodyL (DULCOLAX) suppository 10 mg  10 mg Rectal DAILY PRN  
 alcohol 62% (NOZIN) nasal  1 Ampule  1 Ampule Topical Q12H  
 sodium chloride (NS) flush 5-40 mL  5-40 mL IntraVENous Q8H  
 sodium chloride (NS) flush 5-40 mL  5-40 mL IntraVENous PRN  
 acetaminophen (TYLENOL) tablet 650 mg  650 mg Oral Q6H PRN Or  
 acetaminophen (TYLENOL) suppository 650 mg  650 mg Rectal Q6H PRN  polyethylene glycol (MIRALAX) packet 17 g  17 g Oral DAILY PRN  promethazine (PHENERGAN) tablet 12.5 mg  12.5 mg Oral Q6H PRN Or  
 ondansetron (ZOFRAN) injection 4 mg  4 mg IntraVENous Q6H PRN  
 aspirin delayed-release tablet 81 mg  81 mg Oral DAILY  [Held by provider] atorvastatin (LIPITOR) tablet 40 mg  40 mg Oral QHS  clopidogreL (PLAVIX) tablet 75 mg  75 mg Oral DAILY  gabapentin (NEURONTIN) capsule 100 mg  100 mg Oral TID  [Held by provider] losartan (COZAAR) tablet 100 mg  100 mg Oral DAILY  metoprolol succinate (TOPROL-XL) XL tablet 12.5 mg  12.5 mg Oral DAILY  insulin lispro (HUMALOG) injection   SubCUTAneous AC&HS Objective:  
 
Physical Exam: 
Vitals: Visit Vitals BP (!) 141/65 Pulse (!) 50 Temp 97.6 °F (36.4 °C) Resp 18 Ht 5' 5\" (1.651 m) Wt 61.7 kg (136 lb) SpO2 100% BMI 22.63 kg/m² General: No acute distress. Skin:  Extremities and face reveal no rashes. No sim erythema. No telangiectasias on the chest wall. HEENT: Sclerae anicteric. No oral ulcers. No abnormal pigmentation of the lips. The neck is supple. Cardiovascular: Regular rate and rhythm. No murmurs, gallops, or rubs. Respiratory:  Comfortable breathing  With no accessory muscle use. Clear breath sounds with no wheezes, rales, or rhonchi. GI:  Abdomen nondistended, soft, and nontender. Normal active bowel sounds. No enlargement of the liver or spleen. No masses palpable. Musculoskeletal:  No pitting edema of the lower legs. Extremities have good range of motion. Neurological:  Gross memory appears intact. Patient is alert and oriented. Psychiatric:  Mood appears appropriate with judgement intact. Lymphatic:  No cervical or supraclavicular adenopathy. Laboratory:   
Recent Labs 05/10/21 
4184 WBC 4.9  
RBC 2.39* HGB 7.7* HCT 24.8*  
* Recent Labs 05/10/21 
2205 *   
K 4.6 * CO2 33* BUN 51* CREA 1.56* CA 8.9 No results for input(s): PTP, INR, APTT, INREXT in the last 72 hours. Recent Labs 05/10/21 
7181 * ALB 2.8*  
TP 6.6 Assessment: A 78 y.o. female with dilated bile duct and pancreatic duct on mrcp Plan: ERCP Signed By: Kwame Chirinos MD   
 May 10, 2021

## 2021-05-10 NOTE — PROGRESS NOTES
Insurance approval received. Pt can transfer to [facility] when medically cleared for dc. Pt having ERCP today and should be ready for dc to SNF tomorrow. SW updated family who was at the bedside. SW following to facilitate pt's transfer to rehab at that time.

## 2021-05-11 NOTE — PROGRESS NOTES
GI DAILY PROGRESS NOTE Admit Date:  5/5/2021 Today's Date:  5/11/2021 CC:  Elevated LFTs, Abnormal U/S Subjective:  
 
Had ERCP yesterday showing very dilated bile duct to 15 mm wtihout obvious biliary stricture. Sphincterotomy performed. Patient feeling well, no abdominal pain. Sitting up in chair. Did not eat breakfast because she does not like grits. No nausea or vomiting. Got pain med for ankle pain. Medications:  
Current Facility-Administered Medications Medication Dose Route Frequency  traMADoL (ULTRAM) tablet 50 mg  50 mg Oral BID PRN  
 cefpodoxime (VANTIN) tablet 200 mg  200 mg Oral Q24H  
 furosemide (LASIX) tablet 40 mg  40 mg Oral DAILY  bisacodyL (DULCOLAX) suppository 10 mg  10 mg Rectal DAILY PRN  
 alcohol 62% (NOZIN) nasal  1 Ampule  1 Ampule Topical Q12H  
 sodium chloride (NS) flush 5-40 mL  5-40 mL IntraVENous Q8H  
 sodium chloride (NS) flush 5-40 mL  5-40 mL IntraVENous PRN  polyethylene glycol (MIRALAX) packet 17 g  17 g Oral DAILY PRN  promethazine (PHENERGAN) tablet 12.5 mg  12.5 mg Oral Q6H PRN Or  
 ondansetron (ZOFRAN) injection 4 mg  4 mg IntraVENous Q6H PRN  
 aspirin delayed-release tablet 81 mg  81 mg Oral DAILY  [Held by provider] atorvastatin (LIPITOR) tablet 40 mg  40 mg Oral QHS  clopidogreL (PLAVIX) tablet 75 mg  75 mg Oral DAILY  gabapentin (NEURONTIN) capsule 100 mg  100 mg Oral TID  [Held by provider] losartan (COZAAR) tablet 100 mg  100 mg Oral DAILY  metoprolol succinate (TOPROL-XL) XL tablet 12.5 mg  12.5 mg Oral DAILY  insulin lispro (HUMALOG) injection   SubCUTAneous AC&HS Review of Systems: 
No chest pain or SOB. Does complain of chronic intermittnet right leg pain Objective:  
Vitals: 
Visit Vitals /69 (BP 1 Location: Left arm) Pulse 79 Temp 97.9 °F (36.6 °C) Resp 15 Ht 5' 5\" (1.651 m) Wt 66.5 kg (146 lb 8 oz) SpO2 96% BMI 24.38 kg/m² Intake/Output: 
No intake/output data recorded. 05/09 1901 - 05/11 0700 In: 600 [I.V.:600] Out: 1075 [[id number]] Exam: 
General appearance: alert, cooperative, no distress Neuro:  Alert and oriented without obvious neurological deficits. Data Review (Labs):   
Recent Labs 05/11/21 
0138 05/10/21 
3935 05/09/21 
6219 WBC 6.3 4.9 5.2 HGB 8.5* 7.7* 7.8* HCT 26.1* 24.8* 24.9*  
 136* 140* MCV 99.2* 103.8* 102.0*  
 145 143 K 6.2* 4.6 4.9  
 110* 108* CO2 32 33* 31  
BUN 52* 51* 49* CREA 1.82* 1.56* 1.62* CA 9.3 8.9 9.1 * 126* 104* * 138* 136 ALT 1,202* 99* 113* TBILI 1.0 0.3 0.2 MRI ABD W WO CONTRAST 5/8/21 FINDINGS: 
- LIVER: Normal in size and appearance. - GALLBLADDER/BILE DUCTS: Postcholecystectomy. There is moderate bile duct 
dilatation. Common duct measures 15 mm. No discrete stricture or stone is 
seen. - PANCREAS: Mild pancreatic duct dilatation, 5 mm. No discrete pancreas mass is 
no evidence of acute pancreas inflammation. 
- SPLEEN: Normal. 
- ADRENALS: Normal. 
- KIDNEYS/URETERS: No hydronephrosis or significant mass. 
- LYMPH NODES: No significant retroperitoneal or mesenteric adenopathy. 
- BONES: No fracture or significant bone lesion. 
- OTHER: No significant ascites. 
  
IMPRESSION Moderate bile duct dilatation. No obstructing stone or mass is 
seen. Assessment:  
 
Principal Problem: 
  [facility] (12/24/2020) Active Problems: 
  Coronary artery disease involving coronary bypass graft of native heart without angina pectoris (6/15/2015) Uncontrolled type 2 diabetes mellitus with hyperglycemia ([facility] [address] (6/15/2015) Essential hypertension (6/15/2015) HLD (hyperlipidemia) (6/15/2015) Chronic kidney disease, stage III (moderate) (HCC) (6/15/2015) Metabolic encephalopathy (7/01/4658) Pulmonary HTN ([address] (5/5/2021) Hyperkalemia (5/5/2021) UTI (urinary tract infection) (5/5/2021) Chronic hypoxemic respiratory failure ([facility] [address] (5/5/2021) Hypercapnemia (5/5/2021) Abnormal LFTs (5/5/2021) Constipation (5/6/2021) Plan:  
 
 
  
Pleasant 77yo AAF with CAD s/p CABG on plavix, pulmonary HTN, CKD stage III, DM type2, and chronic respiratory failure  presenting with CHF and UTI, found to have elevated LFTs and abnormal U/S. Her LFTs could be elevated due to hepatic congestion from her CHF and dilated CBD could be due to hx of CCY. However, there is possible PD dilation as well (?double duct sign?) which raises the possibility of a stricture or malignancy. MRI showed 15 mm CBD and 5 mm PD without obvious stricture, stone or mass. ERCP 5/10/21 revealed very dilated bile duct to 15 mm wtihout obvious biliary stricture. Sphincterotomy performed. Transaminases elevated today likely related to the procedure and should decrease fairly rapidly. Patient asymptomatic. 
 
-could consider outpatient EUS to definitively r/o mass given double duct sign. Can discuss with patient and daughter in the office in the future when she recovers SLIME Armstrong

## 2021-05-11 NOTE — PROGRESS NOTES
ACUTE OT GOALS: 
(Developed with and agreed upon by patient and/or caregiver.) 1. Patient will complete lower body bathing and dressing with set up and adaptive equipment as needed. 2. Patient will complete toileting with minimal assistance x1.  
3. Patient will complete grooming ADL with set up. GOAL MET 5/11/21 4. Patient will tolerate 23 minutes of OT treatment with 1-2 rest breaks to increase activity tolerance for ADLs. 5. Patient will complete functional transfers with minimal assistance x1 and adaptive equipment as needed. 6. Patient will tolerate 10 minutes BUE exercises to increase strength for safe, functional transfers.  
  
Timeframe: 7 visits OCCUPATIONAL THERAPY: Daily Note OT Treatment Day # 2 Nicolle Brooke is a 78 y.o. female PRIMARY DIAGNOSIS: Acute hypoxemic respiratory failure ([facility] Acute diastolic CHF (congestive heart failure) ([facility] [address] [I50.31] Procedure(s) (LRB): ENDOSCOPIC RETROGRADE CHOLANGIOPANCREATOGRAPHY (ERCP) ROOM 719 (N/A) ENDOSCOPIC SPHINCTEROTOMY (N/A) ENDOSCOPIC STONE EXTRACTION/BALLOON SWEEP (N/A) 1 Day Post-Op Payor: [employer] MEDICARE / Plan: [employer] [address] / Product Type: Managed Care Medicare /  
ASSESSMENT:  
 
REHAB RECOMMENDATIONS: CURRENT LEVEL OF FUNCTION: 
(Most Recently Demonstrated) Recommendation to date pending progress: 
Setting:  Short-term Rehab Equipment:  To Be Determined Bathing: 
 Not tested Dressing:  Minimal Assistance Feeding/Grooming:  Minimal Assistance Toileting:  Not tested Functional Mobility:  Not tested ASSESSMENT: 
Ms. Deon Fernandez is a 79 y/o female admitted from rehab with acute respiratory failure with hypoxia. Pt wanted to stay in the chair rather than get back to bed this treatment, so all ADLs completed in chair.  Pt min A for self feeding lunch secondary to intention-like tremors in BUE and unable to get spoon to mouth without spilling contents in spoon before reaching mouth, therapist provided facilitation to steady R hand. Pt min A to don/doff gown seated in chair and set up to brush teeth. Pt does not have  strength to open toothpaste or squeeze toothpaste onto toothbrush, but was able to brush teeth independently after set up. Pt generally weak with decreased independence with functional ADLs, fine motor skills and BUE strength. Pt would continue to benefit from skilled OT services to address deficits and goals. Rec back to STR at d/c.   
 
SUBJECTIVE:  
Ms. Areli Bauer states, \"I want to stay in the chair. \" SOCIAL HISTORY/LIVING ENVIRONMENT: admit from rehab, unsure PLOF, pt not a good historian OBJECTIVE:  
 
PAIN: VITAL SIGNS: LINES/DRAINS:  
Pre Treatment: Pain Screen Pain Scale 1: Numeric (0 - 10) Pain Intensity 1: 0 Post Treatment: 0 Vital Signs O2 Sat (%): 99 % O2 Device: Nasal cannula O2 Flow Rate (L/min): 3 l/min Purewick O2 Device: Nasal cannula ACTIVITIES OF DAILY LIVING: I Mod I S SBA CGA Min Mod Max Total NT Comments BASIC ADLs:             
Bathing/ Showering [] [] [] [] [] [] [] [] [] [] Toileting [] [] [] [] [] [] [] [] [] [] Dressing [] [] [] [] [] [x] [] [] [] [] Don/doff gown Feeding [] [] [] [] [] [x] [] [] [] [] Managing spoon and steadying hand secondary to intention like tremors when reaching for food and bringing to mouth Grooming [] [] [x] [] [] [x] [] [] [] [] Set up for brushing teeth and washing face in chair, min A to squeeze toothpaste tube onto toothbrush Personal Device Care [] [] [] [] [] [] [] [] [] [] Functional Mobility [] [] [] [] [] [] [] [] [] [] I=Independent, Mod I=Modified Independent, S=Supervision, SBA=Standby Assistance, CGA=Contact Guard Assistance,  
Min=Minimal Assistance, Mod=Moderate Assistance, Max=Maximal Assistance, Total=Total Assistance, NT=Not Tested MOBILITY: I Mod I S SBA CGA Min Mod Max Total  NT x2 Comments:  
Supine to sit [] [] [] [] [] [] [] [] [] [] [] Sit to supine [] [] [] [] [] [] [] [] [] [] [] Sit to stand [] [] [] [] [] [] [] [] [] [] [] Bed to chair [] [] [] [] [] [] [] [] [] [] [] I=Independent, Mod I=Modified Independent, S=Supervision, SBA=Standby Assistance, CGA=Contact Guard Assistance,  
Min=Minimal Assistance, Mod=Moderate Assistance, Max=Maximal Assistance, Total=Total Assistance, NT=Not Tested BALANCE: Good Fair+ Fair Fair- Poor NT Comments Sitting Static [x] [] [] [] [] [] Sitting Dynamic [] [x] [] [] [] []   
         
Standing Static [] [] [] [] [] []   
Standing Dynamic [] [] [] [] [] [] PLAN:  
FREQUENCY/DURATION: OT Plan of Care: 3 times/week for duration of hospital stay or until stated goals are met, whichever comes first. 
 
TREATMENT:  
TREATMENT:  
($$ Self Care/Home Management: 23-37 mins    ) Self Care (31 Minutes): Self care including Upper Body Dressing, Self Feeding and Grooming to increase independence and decrease level of assistance required. TREATMENT GRID: 
N/A 
 
AFTER TREATMENT POSITION/PRECAUTIONS: 
Alarm Activated, Chair, Needs within reach, RN notified and phlebotomy at bedsided INTERDISCIPLINARY COLLABORATION: 
RN/PCT and OT/PERALTA TOTAL TREATMENT DURATION: 
OT Patient Time In/Time Out Time In: 0528 Time Out: 1154 Ruba Sneed OT

## 2021-05-11 NOTE — PROGRESS NOTES
Unique Hospitalist Progress Note Name:  Anita Melo  Age:81 y.o. Sex:female :  1940 MRN:  [id number] Admit Date:  2021 Reason for Admission: 
Acute diastolic CHF (congestive heart failure) ([facility] [address] [I50.31] Hospital Course/Interval history:  
 
Pt is a 78 y.o. female with a PMHx of HTN, CAD, T2DM, COPD on 2L NC baseline, dHFpEF, pulmonary HTN who was sent from [facility] due to [facility]. Upon EMS arrival pt was immediately responsive. Of note, she has a 3-month hospital stay for COVID-19 pneumonia with a stormy course requiring hemodialysis and multiple endotracheal intubations. She was eventually discharged to skilled nursing facility and has been readmitted multiple times recently several weeks ago. In ED, CXR shows vascular congestion and edema with a pro-BNP 2639. K 5.3, and pt received lokelma.  and AST of 71 and alk phos 165. Hgb 8 but is chronic. UA consistent with UTI and she was started on Ceftriaxone She is being admitted with acute on chronic diastolic heart failure and acute metabolic encephalopathy secondary to UTI. CT head on admission was unremarkable. Her mentation improves to baseline with UTI treatment. Cardiology was consulted for her heart failure. She tolerated diuresing well and was able to be weaned down to her home baseline 2 L NC O2. She was also found to have transaminitis on admission. Her home statin was held. Acute hepatitis panel was unremarkable. RUQ ultrasound showing dilated CBD could be d/t biliary ectasia vs obstructing mass or stricture. Recommends MRCP or ERCP. GI was consulted. MRI with MRCP shows moderate bile duct dilation but no obstructing stone or mass. Patient was planned for ERCP 5/10. Subjective (21): This a.m. patient stated that she feels well. Denies any concerns at this time. Breathing well on 2 to 3 L NC O2. Was placed on Nubia hugger temporarily yesterday due to low temp.   Remains asymptomatic and feeling well. Denies shortness of breath, chest pain, abdominal pain, nausea or vomiting. Review of Systems: 14 point review of systems is otherwise negative with the exception of the elements mentioned above. Assessment & Plan Acute metabolic encephalopathy, resolved CT head on admission shows no acute intracranial abnormalities. Most likely secondary to UTI Avoid sedating meds Delirium precautions PT/OT/ to eval and treat Infection management as stated Vit B12, folate, ammonia--wnl. TSH 4.0 but Free T4 0.9 at low normal. 
Alert and oriented x4 Acute on chronic dHFpEF exacerbation CXR on admission shows evidence of pulmonary edema with pro-BNP>2K. Recent TTE on 4/2021 shows EF 55 to 21% C/W diastolic dysfunction. Low-salt diet, accurate I&O's, daily weights Change Lasix IV to po d/t slight increase in Cr. Now at home dose Lasix po. Changed metoprolol twice daily to ToprolXL Cardiology consulted, appreciate recs--agree with diuresis. On standby CXR shows slight interval improvement Wean down to 2 L NC this a.m. which is her baseline Transaminitis  and AST 31 on admission. Hold home statin Acute hepatitis panel unremarkable RUQ ultrasound showing dilated CBD could be d/t biliary ectasia vs obstructing mass or stricture. Recommends MRCP or ERCP. Consulted GI, appreciate recs MRI with MRCP shows moderate bile duct dilation but no obstructing stone or mass ERCP 5/10 along with sphincterotomy with GI showing very dilated bile duct to 15 mm without biliary stricture. Markedly elevated LFTs 5/11 most likely due to recent procedure and should decrease fairly rapidly per GI. Patient remains asymptomatic. Consider outpatient EUS to definitively rule out mass given double duct sign. GI will discuss with patient and daughter in the office in the future when patient recovers. Hyperkalemia On SSI as well Holding Losartan. Cont home po Lasix Worsening again this a.m., restarted on Lokelma and recheck K 
 
UTI Previous urine cultures grew E. coli Abx: Rocephin (5/5-5/9) UCx 5/5 >100K Klebsiella BCx 5/5 NGTD On Vantin to complete 7-day course (EOT 5/11/21) COPD On 2 L O2 chronically at baseline Continue home inhalers Constipation MiraLAX daily and Dulcolax daily prn for now Macrocytic anemia Chronic at baseline (7.7-9.9) CAD status post CABG Continue home DAPT, BB, losartan. Holding statin d/t elevated LFT's 
 
DMII 
SSI w/ BG check qAC/HS 
 
CKD stage III Creatinine at baseline (1.3-2.0) Diet:  DIET FULL LIQUID DVT PPx: SCD's Code status: DNR Disposition/Expected LOS: Anticipate DC in a.m. to [facility] if K and LFT's trending down. Objective:  
 
Patient Vitals for the past 24 hrs: 
 Temp Pulse Resp BP SpO2  
05/11/21 0725 97.9 °F (36.6 °C) 79 15 137/69 96 % 05/11/21 0403 98.1 °F (36.7 °C) 85 16 139/66 98 % 05/10/21 2315 98.3 °F (36.8 °C) 85 16 (!) 160/67 99 % 05/10/21 1912 97.4 °F (36.3 °C) 78 16 (!) 156/80 100 % 05/10/21 1616 (!) 94.5 °F (34.7 °C)      
05/10/21 1600  (!) 57 16 (!) 163/74 100 % 05/10/21 1403  66 18 (!) 160/82 100 % 05/10/21 1358 97.3 °F (36.3 °C) 68 18 (!) 160/85 100 % 05/10/21 1353  69 18 (!) 149/80 99 % 05/10/21 1348  65 16 (!) 150/77 100 % 05/10/21 1343  66 16 (!) 151/82 100 % 05/10/21 1338  65 14 (!) 149/80 100 % 05/10/21 1333  66 14 (!) 149/74 100 % 05/10/21 1329 97.1 °F (36.2 °C) 83 13 (!) 150/72 99 % 05/10/21 1328 97.1 °F (36.2 °C) 70 14 (!) 150/72 99 % 05/10/21 1152  (!) 50 18 (!) 141/65 100 % Oxygen Therapy O2 Sat (%): 96 % (05/11/21 0725) Pulse via Oximetry: 67 beats per minute (05/10/21 1403) O2 Device: Nasal cannula (05/10/21 1358) O2 Flow Rate (L/min): 3 l/min (05/10/21 1358) Body mass index is 24.38 kg/m². Physical Exam:  
General:  No acute distress, speaking in full sentences, no use of accessory muscles. Pleasant.   Oriented x3 and follows commands well. No jaundice. Lungs:  CTA mostly with slight coarse lung sounds at bases CV:  Regular rate and rhythm with normal S1 and S2 Abdomen:  Soft, nontender, nondistended, normoactive bowel sounds Extremities:  No cyanosis clubbing or edema Neuro:   Nonfocal, A&O x3. Follows commands well Psych:  Normal affect Data Review: 
I have reviewed all labs, meds, and studies from the last 24 hours: 
 
Labs: 
 
Recent Results (from the past 24 hour(s)) GLUCOSE, POC Collection Time: 05/10/21 10:56 AM  
Result Value Ref Range Glucose (POC) 166 (H) 65 - 100 mg/dL Performed by [employer] GLUCOSE, POC Collection Time: 05/10/21  4:03 PM  
Result Value Ref Range Glucose (POC) 214 (H) 65 - 100 mg/dL Performed by [employer] [facility] COVID-19 RAPID TEST Collection Time: 05/10/21  4:30 PM  
Result Value Ref Range Specimen source Nasopharyngeal    
 COVID-19 rapid test Not detected NOTD    
SARS-COV-2 Collection Time: 05/10/21  4:30 PM  
Result Value Ref Range SARS-CoV-2 Please find results under separate order GLUCOSE, POC Collection Time: 05/10/21  8:47 PM  
Result Value Ref Range Glucose (POC) 397 (H) 65 - 100 mg/dL Performed by [employer]ANDLER   
CBC W/O DIFF Collection Time: 05/11/21  5:25 AM  
Result Value Ref Range WBC 6.3 4.3 - 11.1 K/uL  
 RBC 2.63 (L) 4.05 - 5.2 M/uL HGB 8.5 (L) 11.7 - 15.4 g/dL HCT 26.1 (L) 35.8 - 46.3 % MCV 99.2 (H) 79.6 - 97.8 FL  
 MCH 32.3 26.1 - 32.9 PG  
 MCHC 32.6 31.4 - 35.0 g/dL  
 RDW 15.0 (H) 11.9 - 14.6 % PLATELET 050 336 - 540 K/uL MPV 11.4 9.4 - 12.3 FL ABSOLUTE NRBC 0.00 0.0 - 0.2 K/uL METABOLIC PANEL, COMPREHENSIVE Collection Time: 05/11/21  5:25 AM  
Result Value Ref Range Sodium 140 136 - 145 mmol/L Potassium 6.2 (HH) 3.5 - 5.1 mmol/L Chloride 105 98 - 107 mmol/L  
 CO2 32 21 - 32 mmol/L Anion gap 3 (L) 7 - 16 mmol/L Glucose 264 (H) 65 - 100 mg/dL  BUN 52 (H) 8 - 23 MG/DL  
 Creatinine 1.82 (H) 0.6 - 1.0 MG/DL  
 GFR est AA 34 (L) >60 ml/min/1.73m2 GFR est non-AA 28 (L) >60 ml/min/1.73m2 Calcium 9.3 8.3 - 10.4 MG/DL Bilirubin, total 1.0 0.2 - 1.1 MG/DL  
 ALT (SGPT) 1,202 (H) 12 - 65 U/L  
 AST (SGOT) 2,022 (H) 15 - 37 U/L Alk. phosphatase 487 (H) 50 - 136 U/L Protein, total 7.2 6.3 - 8.2 g/dL Albumin 3.0 (L) 3.2 - 4.6 g/dL Globulin 4.2 (H) 2.3 - 3.5 g/dL A-G Ratio 0.7 (L) 1.2 - 3.5 GLUCOSE, POC Collection Time: 05/11/21  6:18 AM  
Result Value Ref Range Glucose (POC) 282 (H) 65 - 100 mg/dL Performed by [employer] Results Procedure Component Value Units Date/Time CULTURE, BLOOD [[id number]] Collected: 05/05/21 2237 Order Status: Completed Specimen: Blood Updated: 05/11/21 6652 Special Requests: --     
  RIGHT Antecubital 
  
  Culture result: NO GROWTH 5 DAYS     
 COVID-19 RAPID TEST [[id number]] Collected: 05/10/21 1630 Order Status: Completed Specimen: Nasopharyngeal Updated: 05/10/21 1828 Specimen source Nasopharyngeal     
  COVID-19 rapid test Not detected Comment:     
The specimen is NEGATIVE for SARS-CoV-2, the novel coronavirus associated with COVID-19. A negative result does not rule out COVID-19. This test has been authorized by the FDA under an Emergency Use Authorization (EUA) for use by authorized laboratories. Fact sheet for Healthcare Providers: ConventionUpdate.co.nz Fact sheet for Patients: ConventionUpdate.co.nz Methodology: Isothermal Nucleic Acid Amplification CULTURE, BLOOD [[id number]] Collected: 05/05/21 1505 Order Status: Completed Specimen: Blood Updated: 05/10/21 0465 Special Requests: RIGHT FOREARM Culture result: NO GROWTH 5 DAYS     
 CULTURE, URINE [[id number]]  (Abnormal)  (Susceptibility) Collected: 05/05/21 1422  Order Status: Completed Specimen: Urine from Clean catch Updated: 05/08/21 5750  
  Special Requests: NO SPECIAL REQUESTS Culture result:    
  >100,000 COLONIES/mL KLEBSIELLA PNEUMONIAE  
     
      
  <10,000 COLONIES/mL MIXED SKIN REDD ISOLATED  
     
  
 
 
EKG Results Procedure 720 Value Units Date/Time EKG, 12 LEAD, INITIAL [[id number]] Collected: 05/05/21 1142 Order Status: Completed Updated: 05/05/21 1714 Ventricular Rate 50 BPM   
  Atrial Rate 50 BPM   
  P-R Interval 246 ms   
  QRS Duration 88 ms Q-T Interval 482 ms QTC Calculation (Bezet) 439 ms Calculated P Axis 36 degrees Calculated R Axis 59 degrees Calculated T Axis 109 degrees Diagnosis --  
  Sinus bradycardia with 1st degree A-V block Cannot rule out Anterior infarct (cited on or before 16-APR-2021) T wave abnormality, consider lateral ischemia Abnormal ECG When compared with ECG of 16-APR-2021 12:24, No significant change was found Confirmed by Brent Chávez ([id number]) on 5/5/2021 5:14:17 PM 
  
  
 
 
Other Studies: Xr Ercp / Ercb Combined Result Date: 5/10/2021 ERCP HISTORY: Dilated bile duct TECHNIQUE AND FINDINGS: 2.6 minutes of fluoroscopy time was utilized. 6 spot fluoroscopic images were saved. Initial image demonstrates injection of the dilated common bile duct with reflux of contrast into mildly distended intrahepatic bile ducts. Per the procedure note, a sphincterotomy was performed. Dilated extra hepatic bile ducts. Current Meds:  
Current Facility-Administered Medications Medication Dose Route Frequency  traMADoL (ULTRAM) tablet 50 mg  50 mg Oral BID PRN  
 cefpodoxime (VANTIN) tablet 200 mg  200 mg Oral Q24H  
 furosemide (LASIX) tablet 40 mg  40 mg Oral DAILY  bisacodyL (DULCOLAX) suppository 10 mg  10 mg Rectal DAILY PRN  
 alcohol 62% (NOZIN) nasal  1 Ampule  1 Ampule Topical Q12H  
 sodium chloride (NS) flush 5-40 mL  5-40 mL IntraVENous Q8H  
 sodium chloride (NS) flush 5-40 mL  5-40 mL IntraVENous PRN  
 polyethylene glycol (MIRALAX) packet 17 g  17 g Oral DAILY PRN  promethazine (PHENERGAN) tablet 12.5 mg  12.5 mg Oral Q6H PRN Or  
 ondansetron (ZOFRAN) injection 4 mg  4 mg IntraVENous Q6H PRN  
 aspirin delayed-release tablet 81 mg  81 mg Oral DAILY  [Held by provider] atorvastatin (LIPITOR) tablet 40 mg  40 mg Oral QHS  clopidogreL (PLAVIX) tablet 75 mg  75 mg Oral DAILY  gabapentin (NEURONTIN) capsule 100 mg  100 mg Oral TID  [Held by provider] losartan (COZAAR) tablet 100 mg  100 mg Oral DAILY  metoprolol succinate (TOPROL-XL) XL tablet 12.5 mg  12.5 mg Oral DAILY  insulin lispro (HUMALOG) injection   SubCUTAneous AC&HS Problem List: 
Hospital Problems as of 5/11/2021 Date Reviewed: 4/19/2021 Codes Class Noted - Resolved POA Constipation ICD-10-CM: K59.00 ICD-9-CM: 564.00  5/6/2021 - Present Unknown Acute diastolic CHF (congestive heart failure) (HCC) ICD-10-CM: I50.31 ICD-9-CM: 428.31, 428.0  5/5/2021 - Present Diastolic CHF, chronic (HCC) ICD-10-CM: I50.32 
ICD-9-CM: 428.32, 428.0  5/5/2021 - Present Pulmonary HTN ([facility] [address] ICD-10-CM: I27.20 ICD-9-CM: 416.8  5/5/2021 - Present Unknown Hyperkalemia ICD-10-CM: E87.5 ICD-9-CM: 276.7  5/5/2021 - Present Unknown UTI (urinary tract infection) ICD-10-CM: N39.0 ICD-9-CM: 599.0  5/5/2021 - Present Unknown Chronic hypoxemic respiratory failure [facility]) ICD-10-CM: J96.11 
ICD-9-CM: 518.83, 799.02  5/5/2021 - Present Unknown Hypercapnemia ICD-10-CM: R06.89 
ICD-9-CM: 786.09  5/5/2021 - Present Unknown Abnormal LFTs ICD-10-CM: R94.5 ICD-9-CM: 790.6  5/5/2021 - Present Unknown * (Principal) Acute hypoxemic respiratory failure ([facility] [address] ICD-10-CM: J96.01 
ICD-9-CM: 518.81  12/24/2020 - Present Yes Metabolic encephalopathy [facility]: G93.41 
ICD-9-CM: 348.31  1/30/2019 - Present Unknown  Coronary artery disease involving coronary bypass graft of native heart without angina pectoris (Chronic) ICD-10-CM: U30.533 ICD-9-CM: 414.05  6/15/2015 - Present Yes Uncontrolled type 2 diabetes mellitus with hyperglycemia (HCC) (Chronic) ICD-10-CM: E11.65 ICD-9-CM: 250.02  6/15/2015 - Present Yes Essential hypertension (Chronic) ICD-10-CM: I10 
ICD-9-CM: 401.9  6/15/2015 - Present Yes HLD (hyperlipidemia) (Chronic) ICD-10-CM: Z09.8 ICD-9-CM: 272.4  6/15/2015 - Present Yes Chronic kidney disease, stage III (moderate) (HCC) (Chronic) ICD-10-CM: N18.30 ICD-9-CM: 585.3  6/15/2015 - Present Yes Part of this note was written by using a voice dictation software and the note has been proof read but may still contain some grammatical/other typographical errors. Signed By: DO Krzysztof Macedo [employer] [facility]  May 11, 2021  5:15 PM

## 2021-05-11 NOTE — PROGRESS NOTES
ACUTE PHYSICAL THERAPY GOALS: 
(Developed with and agreed upon by patient and/or caregiver.) ST. Patient will perform bed mobility with STAND BY ASSISTANCE within 3 days. 2. Patient will transfer bed to chair with MODERATE ASSISTANCE x1 within 3 days. 3. Patient will demonstrate GOOD DYNAMIC UNSUPPORTED SITTING balance within 3 day(s). 4. Patient will tolerate 15+ minutes of therapeutic activity/exercise and/or neuromuscular re-education while maintaining stable vitals to improve functional strength and activity tolerance within 3 days. 
  
LT. Patient will perform bed mobility with MODIFIED INDEPENDENCE within 7 days. 2. Patient will transfer bed to chair with MINIMAL ASSISTANCE within 7 days. 3. Patient will demonstrate FAIR STATIC STANDING balance within 7 day(s). 4. Patient will ambulate 25ft + using least restrictive assistive device and MODERATE ASSISTANCE within 7 days. 5. Patient will tolerate 25+ minutes of therapeutic activity/exercise and/or neuromuscular re-education while maintaining stable vitals to improve functional strength and activity tolerance within 7 days. PHYSICAL THERAPY: Daily Note and AM Treatment Day # 2 Babar Wall is a 78 y.o. female PRIMARY DIAGNOSIS: Acute hypoxemic respiratory failure ([facility] [address] Acute diastolic CHF (congestive heart failure) ([facility] [address] [I50.31] Procedure(s) (LRB): ENDOSCOPIC RETROGRADE CHOLANGIOPANCREATOGRAPHY (ERCP) ROOM 719 (N/A) ENDOSCOPIC SPHINCTEROTOMY (N/A) ENDOSCOPIC STONE EXTRACTION/BALLOON SWEEP (N/A) 1 Day Post-Op ASSESSMENT:  
 
REHAB RECOMMENDATIONS: CURRENT LEVEL OF FUNCTION: 
(Most Recently Demonstrated) Recommendation to date pending progress: 
Setting:  Short-term Rehab Equipment:  To Be Determined Bed Mobility: 
[facility] Sit to Stand:  Moderate Assistance x 2 Transfers:  Moderate Assistance x 2 Gait/Mobility:  Moderate Assistance x 2  
 
ASSESSMENT: 
Ms. Oksana Garland is making slow, steady progress towards PT goals. She performs supine to sit with SBA but needed mod assist to scoot hips to edge of bed. Patient then transferred to standing with mod assist x 2. Attempted gait training but patient demonstrated poor  Balance. She transferred to recliner chair with mod assist x 2. Will continue efforts. SUBJECTIVE:  
Ms. Monroe Mckeon states, \"Thank you. \" SOCIAL HISTORY/ LIVING ENVIRONMENT: See initial evaluation OBJECTIVE:  
 
PAIN: VITAL SIGNS: LINES/DRAINS:  
Pre Treatment: Pain Screen Pain Scale 1: FLACC Pain Intensity 1: 0 Post Treatment: 0   [place] O2 Device: Nasal cannula MOBILITY: I Mod I S SBA CGA Min Mod Max Total  NT x2 Comments:  
Bed Mobility Rolling [] [] [] [] [] [] [] [] [] [] [] Supine to Sit [] [] [] [x] [] [] [] [] [] [] [] Scooting [] [] [] [] [] [] [x] [] [] [] [] Sit to Supine [] [] [] [] [] [] [] [] [] [] []   
Transfers Sit to Stand [] [] [] [] [] [] [x] [] [] [] [x] Bed to Chair [] [] [] [] [] [] [x] [] [] [] [x] Stand to Sit [] [] [] [] [] [] [x] [] [] [] [x] I=Independent, Mod I=Modified Independent, S=Supervision, SBA=Standby Assistance, CGA=Contact Guard Assistance,  
Min=Minimal Assistance, Mod=Moderate Assistance, Max=Maximal Assistance, Total=Total Assistance, NT=Not Tested BALANCE: Good Fair+ Fair Fair- Poor NT Comments Sitting Static [x] [] [] [] [] [] Sitting Dynamic [] [x] [] [] [] []   
         
Standing Static [] [] [] [] [x] [] Standing Dynamic [] [] [] [] [x] [] GAIT: I Mod I S SBA CGA Min Mod Max Total  NT x2 Comments:  
Level of Assistance [] [] [] [] [] [] [x] [] [] [] [x] Distance 2'  [facility] Gait Quality Weightbearing Status N/A I=Independent, Mod I=Modified Independent, S=Supervision, SBA=Standby Assistance, CGA=Contact Guard Assistance,  
Min=Minimal Assistance, Mod=Moderate Assistance, Max=Maximal Assistance, Total=Total Assistance, NT=Not Tested PLAN:  
FREQUENCY/DURATION: PT Plan of Care: 3 times/week for duration of hospital stay or until stated goals are met, whichever comes first. 
TREATMENT:  
 
TREATMENT:  
($$ Therapeutic Activity: 23-37 mins    ) Therapeutic Activity (24 Minutes): Therapeutic activity included Supine to Sit, Scooting, Transfer Training, Ambulation on level ground, Sitting balance , Standing balance and LE exercises to improve functional Mobility, Strength and Activity tolerance. TREATMENT GRID: 
N/A 
 
AFTER TREATMENT POSITION/PRECAUTIONS: 
Alarm Activated, Chair, Needs within reach and RN notified INTERDISCIPLINARY COLLABORATION: 
RN/PCT, PT/PTA and Rehab Attendant TOTAL TREATMENT DURATION: 
PT Patient Time In/Time Out Time In: 0628 Time Out: 1609 Shakira Huntley PTA

## 2021-05-12 NOTE — PROGRESS NOTES
Problem: Falls - Risk of 
Goal: *Absence of Falls Description: Document Jesus Coronado Fall Risk and appropriate interventions in the flowsheet. Outcome: Progressing Towards Goal 
Note: Fall Risk Interventions: 
Mobility Interventions: Bed/chair exit alarm, OT consult for ADLs, Patient to call before getting OOB, PT Consult for mobility concerns Mentation Interventions: Bed/chair exit alarm, Adequate sleep, hydration, pain control, Evaluate medications/consider consulting pharmacy, Reorient patient Medication Interventions: Evaluate medications/consider consulting pharmacy, Bed/chair exit alarm, Patient to call before getting OOB, Teach patient to arise slowly Elimination Interventions: Call light in reach, Bed/chair exit alarm, Patient to call for help with toileting needs, Stay With Me (per policy) History of Falls Interventions: Bed/chair exit alarm, Door open when patient unattended, Investigate reason for fall Problem: Patient Education: Go to Patient Education Activity Goal: Patient/Family Education Outcome: Progressing Towards Goal 
  
Problem: Pressure Injury - Risk of 
Goal: *Prevention of pressure injury Description: Document Ashu Scale and appropriate interventions in the flowsheet. Outcome: Progressing Towards Goal 
Note: Pressure Injury Interventions: 
Sensory Interventions: Assess changes in LOC, Assess need for specialty bed, Check visual cues for pain, Discuss PT/OT consult with provider, Keep linens dry and wrinkle-free, Pressure redistribution bed/mattress (bed type) Moisture Interventions: Absorbent underpads, Apply protective barrier, creams and emollients, Internal/External urinary devices, Limit adult briefs Activity Interventions: Pressure redistribution bed/mattress(bed type), PT/OT evaluation, Increase time out of bed Mobility Interventions: HOB 30 degrees or less, Pressure redistribution bed/mattress (bed type), PT/OT evaluation Nutrition Interventions: Document food/fluid/supplement intake Friction and Shear Interventions: Apply protective barrier, creams and emollients, Foam dressings/transparent film/skin sealants Problem: Patient Education: Go to Patient Education Activity Goal: Patient/Family Education Outcome: Progressing Towards Goal 
  
Problem: Urinary Tract Infection - Adult Goal: *Absence of infection signs and symptoms Outcome: Progressing Towards Goal 
  
Problem: Patient Education: Go to Patient Education Activity Goal: Patient/Family Education Outcome: Progressing Towards Goal 
  
Problem: Pain Goal: *Control of Pain Outcome: Progressing Towards Goal 
  
Problem: Patient Education: Go to Patient Education Activity Goal: Patient/Family Education Outcome: Progressing Towards Goal

## 2021-05-12 NOTE — DISCHARGE SUMMARY
Hospitalist Discharge Summary Patient ID: 
Jason Chicas [id number] 
86 y.o. 
1940 Admit date: 5/5/2021 11:22 AM 
Discharge date and time: 5/12/2021 Attending: Cassandra Daniel DO 
PCP:  Glendy Mccall MD 
Treatment Team: Attending Provider: Cassandra Daniel DO; Utilization Review: Luther Alvarez; Utilization Review: Dunia Palomino RN; Care Manager: Blayne Isbell LMSW; Consulting Provider: Echo Davila MD; Primary Nurse: Chester Madrid RN; Physical Therapy Assistant: Josh Moran; Occupational Therapy Assistant: Amanda Downing Principal Diagnosis Acute hypoxemic respiratory failure ([facility] [address] Principal Problem: 
  Acute hypoxemic respiratory failure ([address] (12/24/2020) Active Problems: 
  Coronary artery disease involving coronary bypass graft of native heart without angina pectoris (6/15/2015) Uncontrolled type 2 diabetes mellitus with hyperglycemia ([address] (6/15/2015) Essential hypertension (6/15/2015) HLD (hyperlipidemia) (6/15/2015) Chronic kidney disease, stage III (moderate) (HCC) (6/15/2015) Metabolic encephalopathy (6/45/4426) Pulmonary HTN ([address] (5/5/2021) Hyperkalemia (5/5/2021) UTI (urinary tract infection) (5/5/2021) Chronic hypoxemic respiratory failure ([address] (5/5/2021) Hypercapnemia (5/5/2021) Abnormal LFTs (5/5/2021) Constipation (5/6/2021) Hospital Course: 
Please refer to the admission H&P for details of presentation. In summary, the patient is an 78 y. o. female with a PMHx of HTN, CAD, T2DM, COPD on 2L NC baseline, dHFpEF, pulmonary HTN who was sent from [address] [place] [address] due to [facility] [place] Upon EMS arrival pt was immediately responsive.  Of note, she has a 3-month hospital stay for COVID-19 pneumonia with a stormy course requiring hemodialysis and multiple endotracheal intubations.  She was eventually discharged to skilled nursing facility and has been readmitted multiple times recently several weeks ago.   
In ED, CXR shows vascular congestion and edema with a pro-BNP 2639. K 5.3, and pt received lokelma.  and AST of 71 and alk phos 165.  Hgb 8 but is chronic. UA consistent with UTI and she was started on Ceftriaxone 
  She was admitted with acute on chronic diastolic heart failure with hypoxia and acute metabolic encephalopathy secondary to UTI. CT head on admission was unremarkable. Her mentation improves to baseline with UTI treatment and she completed her course of antibiotics during hospital course. Cardiology was consulted for her heart failure. She tolerated diuresing well and was able to be weaned down to her home baseline 2 L NC O2. She was also found to have transaminitis on admission. Her home statin was held. Acute viral hepatitis panel was unremarkable. RUQ ultrasound showing dilated CBD could be d/t biliary ectasia vs obstructing mass or stricture. Recommends MRCP or ERCP. GI was consulted. Pt underwent MRI with MRCP shows moderate bile duct dilation but no obstructing stone or mass. Patient underwent ERCP on 5/10 along with sphincterotomy showing very dilated bile duct to 15 mm without biliary stricture. Markedly elevated LFTs 5/11 most likely due to recent procedure vs ischemic. Patient remains asymptomatic. Gi recommended to follow up outpatient to consider EUS to definitively rule out mass given double duct sign. GI will discuss with patient and daughter in the office in the future when patient recovers. GI recommends for pt to get repeat LFT's on 5/17/21 to ensure LFT's trending down. Atorvastatin will be held until pt follow up with GI to determine about restarting. Patient was also found to have intermittent hyperkalemia during hospital course. Home losartan was discontinued. Home Lasix was resumed after patient completed course of IV diuresing. She was treated with DA[facility] MADRIAGL [facility] as well she responded well.   She will need to remain on low potassium diet and will need her BMP repeat in 2 to 3 days. Patient continues to feel well after she completed her UTI treatment. Denies any pain anywhere. Patient was discharged in hemodynamically stable condition. She will need to follow-up with PCP in 3 days for repeat BMP. Will need repeat CMP on Monday, 5/17/2021. She will need to follow-up with GI outpatient in their clinic will arrange for appointment. Return precautions given. Patient is high risk for readmission due to her comorbidities. Significant Diagnostic Studies: XR ERCP / ERCB COMBINED Final Result Dilated extra hepatic bile ducts. MRI ABD W WO CONT  
ED Interpretation Biliary and pancreatic ductal dilatation with abrupt tapering near the ampulla w/o definite stone--?stricture. Final report t/f from [facility]. Final Result Moderate bile duct dilatation. No obstructing stone or mass is  
seen. If there are any questions about this report, I can be reached on [employer]  
or at [phone] XR CHEST SNGL V Final Result SLIGHT INTERVAL IMPROVEMENT IN CONGESTIVE HEART FAILURE/FLUID IMBALANCE. US ABD LTD Final Result 1. Dilated common bile duct as well as dilatation of the visualized pancreatic  
duct. While these findings could be in part due to biliary ectasia following  
cholecystectomy, the possibility of an obstructing mass or stricture cannot be  
excluded. An MRCP or ERCP may be beneficial if there is further clinical  
concern. 2. Echogenic and atrophic right kidney suggesting medical renal disease. CT HEAD WO CONT Final Result No acute intracranial abnormality evident by CT. XR CHEST PORT Final Result 1. Cardiomegaly with pulmonary vascular congestion and edema. 2.  Suspected small bilateral effusions. NC XR TECHNOLOGIST SERVICE    (Results Pending) Labs: Results:  
   
Chemistry Recent Labs   05/12/21 
0510 05/11/21 
1158 05/11/21 
0525 05/10/21 
5569 *  --  264* 126*   --  140 145  
K 5.0 5.5* 6.2* 4.6   --  105 110* CO2 35*  --  32 33* BUN 53*  --  52* 51* CREA 1.76*  --  1.82* 1.56* CA 9.3  --  9.3 8.9 AGAP 3*  --  3* 2* *  --  487* 138* TP 6.8  --  7.2 6.6 ALB 2.9*  --  3.0* 2.8*  
GLOB 3.9*  --  4.2* 3.8* AGRAT 0.7*  --  0.7* 0.7* CBC w/Diff Recent Labs 05/12/21 
0510 05/11/21 
8944 05/10/21 
8646 WBC 6.4 6.3 4.9  
RBC 2.42* 2.63* 2.39* HGB 7.8* 8.5* 7.7* HCT 25.1* 26.1* 24.8*  
* 160 136* Cardiac Enzymes No results for input(s): CPK, CKND1, MARIO ALBERTO in the last 72 hours. No lab exists for component: Katrin Garcia Coagulation Recent Labs 05/11/21 
1314 PTP 13.5 INR 1.0 Lipid Panel Lab Results Component Value Date/Time Cholesterol, total 221 (H) 03/09/2015 02:42 PM  
 HDL Cholesterol 64 03/09/2015 02:42 PM  
 LDL, calculated 114 03/09/2015 02:42 PM  
 VLDL, calculated 43 (H) 03/09/2015 02:42 PM  
 Triglyceride 217 (H) 03/09/2015 02:42 PM  
 CHOL/HDL Ratio 3.5 03/09/2015 02:42 PM  
  
BNP No results for input(s): BNPP in the last 72 hours. Liver Enzymes Recent Labs 05/12/21 
0510 TP 6.8 ALB 2.9* * Thyroid Studies Lab Results Component Value Date/Time TSH 4.080 (H) 05/07/2021 04:34 AM  
    
 
 
Discharge Exam: 
Visit Arlene Garzon /68 (BP 1 Location: Right arm) Pulse 68 Temp 98.2 °F (36.8 °C) Resp 14 Ht 5' 5\" (1.651 m) Wt 68.8 kg (151 lb 11.2 oz) SpO2 98% BMI 25.24 kg/m² General:          No acute distress, speaking in full sentences, no use of accessory muscles. Pleasant. Oriented x3 and follows commands well. No jaundice. Lungs:             CTA mostly with slight coarse lung sounds at bases CV:                  Regular rate and rhythm with normal S1 and S2 Abdomen:        Soft, nontender, nondistended, normoactive bowel sounds Extremities:     No cyanosis clubbing or edema Neuro:             Nonfocal, A&O x3. Follows commands well Psych:             Normal affect Disposition: home Discharge Condition: stable Patient Instructions:  
Current Discharge Medication List  
  
START taking these medications Details  
metoprolol succinate (TOPROL-XL) 25 mg XL tablet Take 0.5 Tabs by mouth daily. Qty: 30 Tab, Refills: 0 Start date: 5/13/2021 CONTINUE these medications which have NOT CHANGED Details  
ferrous sulfate 325 mg (65 mg iron) tablet Take  by mouth every other day. insulin detemir U-100 (Levemir U-100 Insulin) 100 unit/mL injection 5 Units by SubCUTAneous route Every morning. Omeprazole delayed release (PRILOSEC D/R) 20 mg tablet Take 20 mg by mouth daily. ascorbic acid, vitamin C, (Vitamin C) 500 mg tablet Take  by mouth every other day. insulin lispro (HUMALOG) 100 unit/mL injection Please give as per sliding scale Qty: 1 Vial, Refills: 0  
  
furosemide (LASIX) 40 mg tablet Take 1 Tab by mouth daily for 30 days. Qty: 30 Tab, Refills: 0  
  
aspirin delayed-release 81 mg tablet Take  by mouth daily. Insulin Needles, Disposable, 31 gauge x 5/16\" ndle by SubCUTAneous route Before breakfast, lunch, dinner and at bedtime. Qty: 1 Package, Refills: 11  
  
clopidogrel (PLAVIX) 75 mg tab Take 75 mg by mouth. Blood-Glucose Meter (ONETOUCH ULTRAMINI) monitoring kit Use as directed Qty: 1 Kit, Refills: 0 Comments: Dx: 250.00 insulin based  
  
glucose blood VI test strips (ONETOUCH ULTRA TEST) strip Test 4 times daily as directed Qty: 100 Strip, Refills: 11 Comments: Dx: 250.00 insulin based Lancets (ONETOUCH ULTRASOFT LANCETS) misc Test 4 times daily as directed Qty: 100 Each, Refills: 11 Comments: Dx: 250.00 insulin based  
  
gabapentin (NEURONTIN) 100 mg capsule Take 1 Cap by mouth three (3) times daily. Qty: 30 Cap, Refills: 5 Associated Diagnoses: Peripheral neuropathy  
  
rOPINIRole (REQUIP) 0.5 mg tablet Take 1 Tab by mouth nightly as needed. Qty: 90 Tab, Refills: 1 Associated Diagnoses: RLS (restless legs syndrome) STOP taking these medications  
  
 atorvastatin (LIPITOR) 40 mg tablet Comments:  
Reason for Stopping: Elevated LFT's  
F/u with GI to see can resume after LFT's improve  
   
 metoprolol tartrate (LOPRESSOR) 25 mg tablet Comments:  
Reason for Stopping: Dose change  
   
 losartan (COZAAR) 100 mg tablet Comments:  
Reason for Stopping: Hyperkalemia  
   
 rosuvastatin (CRESTOR) 20 mg tablet Comments:  
Reason for Stopping: Elevated LFt's. If transaminitis resolves and OK with GI, can resume Activity: Activity as tolerated Diet: Diabetic Diet and LOW POTASSIUM DIET Follow-up: 
Follow-up with PCP in 3 days. Will need repeat BMP then to assess for K. Will need repeat LFTs on Monday, 5/17/2021 per GI recommendations. Follow-up with GI as scheduled. Their clinic will arrange for follow-up. Time spent to discharge patient 35 minutes Signed:  
Andrés Tsang DO 
5/12/2021 
11:59 AM

## 2021-05-12 NOTE — PROGRESS NOTES
ACUTE PHYSICAL THERAPY GOALS: 
(Developed with and agreed upon by patient and/or caregiver.) ST. Patient will perform bed mobility with STAND BY ASSISTANCE within 3 days. 2. Patient will transfer bed to chair with MODERATE ASSISTANCE x1 within 3 days. 3. Patient will demonstrate GOOD DYNAMIC UNSUPPORTED SITTING balance within 3 day(s). 4. Patient will tolerate 15+ minutes of therapeutic activity/exercise and/or neuromuscular re-education while maintaining stable vitals to improve functional strength and activity tolerance within 3 days. 
  
LT. Patient will perform bed mobility with MODIFIED INDEPENDENCE within 7 days. 2. Patient will transfer bed to chair with MINIMAL ASSISTANCE within 7 days. 3. Patient will demonstrate FAIR STATIC STANDING balance within 7 day(s). 4. Patient will ambulate 25ft + using least restrictive assistive device and MODERATE ASSISTANCE within 7 days. 5. Patient will tolerate 25+ minutes of therapeutic activity/exercise and/or neuromuscular re-education while maintaining stable vitals to improve functional strength and activity tolerance within 7 days. PHYSICAL THERAPY: Daily Note and AM Treatment Day # 3 Jayden Cary is a 78 y.o. female PRIMARY DIAGNOSIS: Acute hypoxemic respiratory failure ([facility] [address] Acute diastolic CHF (congestive heart failure) ([facility] [address] [I50.31] Procedure(s) (LRB): ENDOSCOPIC RETROGRADE CHOLANGIOPANCREATOGRAPHY (ERCP) ROOM 719 (N/A) ENDOSCOPIC SPHINCTEROTOMY (N/A) ENDOSCOPIC STONE EXTRACTION/BALLOON SWEEP (N/A) 2 Days Post-Op ASSESSMENT:  
 
REHAB RECOMMENDATIONS: CURRENT LEVEL OF FUNCTION: 
(Most Recently Demonstrated) Recommendation to date pending progress: 
Setting:  Short-term Rehab Equipment:  To Be Determined Bed Mobility: 
Pauline Jenkins Sit to Stand:  Moderate Assistance x 2 Transfers:  Moderate Assistance x 2 Gait/Mobility:  Moderate Assistance x 2  
 
ASSESSMENT: 
Ms. Jairon Holman is making slow progress toward goals. She worked on sitting balance, LE ex, transfers and gt w/ RW. She transferred to recliner chair with mod assist x 2. Will continue working toward goals. SUBJECTIVE:  
Ms. Jensen Huffman answers yes/no questions. SOCIAL HISTORY/ LIVING ENVIRONMENT: History significant for prolonged hospital course for COVID 19, readmitted from [facility] with metabolic encephalopathy. Patient seen this AM 
 for initial PT evaluation. Patient is oriented to person and place but a poor historian regarding PLOF at [facility]. OBJECTIVE:  
 
PAIN: VITAL SIGNS: LINES/DRAINS:  
Pre Treatment:  Did not appear in pain. Post Treatment: 0   IV and Purewick O2 Device: Nasal cannula MOBILITY: I Mod I S SBA CGA Min Mod Max Total  NT x2 Comments:  
Bed Mobility Rolling [] [] [] [] [] [] [] [] [] [] [] Supine to Sit [] [] [] [x] [] [] [] [] [] [] [] Scooting [] [] [] [] [] [] [x] [] [] [] [x] Sit to Supine [] [] [] [] [] [] [] [] [] [] []   
Transfers Sit to Stand [] [] [] [] [] [] [x] [] [] [] [x] Bed to Chair [] [] [] [] [] [] [x] [] [] [] [x] Stand to Sit [] [] [] [] [] [] [x] [] [] [] [x] I=Independent, Mod I=Modified Independent, S=Supervision, SBA=Standby Assistance, CGA=Contact Guard Assistance,  
Min=Minimal Assistance, Mod=Moderate Assistance, Max=Maximal Assistance, Total=Total Assistance, NT=Not Tested BALANCE: Good Fair+ Fair Fair- Poor NT Comments Sitting Static [x] [] [] [] [] [] Sitting Dynamic [] [x] [] [] [] []   
         
Standing Static [] [] [] [] [x] [] Standing Dynamic [] [] [] [] [x] [] GAIT: I Mod I S SBA CGA Min Mod Max Total  NT x2 Comments:  
Level of Assistance [] [] [] [] [] [] [x] [] [] [] [x] Cues for safety Distance [facility] Gait Quality flexed Weightbearing Status N/A I=Independent, Mod I=Modified Independent, S=Supervision, SBA=Standby Assistance, CGA=Contact Guard Assistance,  
Min=Minimal Assistance, Mod=Moderate Assistance, Max=Maximal Assistance, Total=Total Assistance, NT=Not Tested PLAN:  
FREQUENCY/DURATION: PT Plan of Care: 3 times/week for duration of hospital stay or until stated goals are met, whichever comes first. 
TREATMENT:  
 
TREATMENT:  
($$ Therapeutic Activity: 23-37 mins 
$$ Therapeutic Exercises: 8-22 mins    ) Therapeutic Activity (30 Minutes): Therapeutic activity included Supine to Sit, Scooting, Transfer Training, Ambulation on level ground, Sitting balance  and Standing balance to improve functional Mobility, Strength and Activity tolerance. Therapeutic Exercise (11 Minutes): Therapeutic exercises noted below to improve functional activity tolerance, strength and mobility. TREATMENT GRID: 
. Date: 
5/12 Date: 
 Date: Activity/Exercise Seated Parameters Parameters Parameters Heel raises X 15 B Toe raises X 15 B    
LAQ's X 15 B Hip Flex X 15 B Hip ABD X 15 B    
     
     
     
 
 
AFTER TREATMENT POSITION/PRECAUTIONS: 
Alarm Activated, Chair, Needs within reach and RN notified INTERDISCIPLINARY COLLABORATION: 
RN/PCT, PT/PTA and OT/PERALTA TOTAL TREATMENT DURATION: 
PT Patient Time In/Time Out Time In: 5294 Time Out: 1029 Vadim Reyes PTA

## 2021-05-12 NOTE — PROGRESS NOTES
ACUTE OT GOALS: 
(Developed with and agreed upon by patient and/or caregiver.) 1. Patient will complete lower body bathing and dressing with set up and adaptive equipment as needed. 2. Patient will complete toileting with minimal assistance x1.  
3. Patient will complete grooming ADL with set up. GOAL MET 5/11/21 4. Patient will tolerate 23 minutes of OT treatment with 1-2 rest breaks to increase activity tolerance for ADLs. 5. Patient will complete functional transfers with minimal assistance x1 and adaptive equipment as needed. 6. Patient will tolerate 10 minutes BUE exercises to increase strength for safe, functional transfers.  
  
Timeframe: 7 visits OCCUPATIONAL THERAPY: Daily Note OT Treatment Day # 3 Shelly Wheeler is a 78 y.o. female PRIMARY DIAGNOSIS: Acute hypoxemic respiratory failure ([facility] [address] Acute diastolic CHF (congestive heart failure) ([facility] [address] [I50.31] Procedure(s) (LRB): ENDOSCOPIC RETROGRADE CHOLANGIOPANCREATOGRAPHY (ERCP) ROOM 719 (N/A) ENDOSCOPIC SPHINCTEROTOMY (N/A) ENDOSCOPIC STONE EXTRACTION/BALLOON SWEEP (N/A) 2 Days Post-Op Payor: [employer] MEDICARE / Plan: [employer] / Product Type: [employer] Medicare /  
ASSESSMENT:  
 
REHAB RECOMMENDATIONS: CURRENT LEVEL OF FUNCTION: 
(Most Recently Demonstrated) Recommendation to date pending progress: 
Setting:  Short-term Rehab Equipment:  
 Rolling Walker Bathing:  Minimal Assistance Dressing:  Minimal Assistance Feeding/Grooming:  Minimal Assistance Toileting:  Not tested Functional Mobility:  Moderate Assistance x 2  
 
ASSESSMENT: 
Ms. Tanvir Blackman presents in supine sleeping upon arrival. Pt completed supine to sit transfer with mod a and additional time. Pt sat edge of bed with good balance and completed bathing, dressing, and grooming and hygiene tasks. Pt completed functional mobility with mod a x's 2 and a rolling walker. Good effort today. Continue POC. SUBJECTIVE:  
Ms. Dinora Chávez states, \" thank you. \" SOCIAL HISTORY/LIVING ENVIRONMENT: admit from rehab, unsure PLOF, pt not a good historian OBJECTIVE:  
 
PAIN: VITAL SIGNS: LINES/DRAINS:  
Pre Treatment: Pain Screen Pain Scale 1: Numeric (0 - 10) Pain Intensity 1: 0 Post Treatment: 0   Purewick O2 Device: None (Room air) ACTIVITIES OF DAILY LIVING: I Mod I S SBA CGA Min Mod Max Total NT Comments BASIC ADLs:             
Bathing/ Showering [] [] [] [] [] [x] [] [] [] [] Toileting [] [] [] [] [] [] [] [] [] [x] Dressing [] [] [] [] [] [x] [] [] [] [] Feeding [] [] [] [] [] [] [] [] [] []   
Grooming [] [] [] [] [] [x] [] [] [] [] Personal Device Care [] [] [] [] [] [] [] [] [x] [] Functional Mobility [] [] [] [] [] [] [x] [] [] [] I=Independent, Mod I=Modified Independent, S=Supervision, SBA=Standby Assistance, CGA=Contact Guard Assistance,  
Min=Minimal Assistance, Mod=Moderate Assistance, Max=Maximal Assistance, Total=Total Assistance, NT=Not Tested MOBILITY: I Mod I S SBA CGA Min Mod Max Total  NT x2 Comments:  
Supine to sit [] [] [] [] [] [] [x] [] [] [] [] Sit to supine [] [] [] [] [] [] [] [] [] [x] [] Sit to stand [] [] [] [] [] [] [x] [] [] [] [x] Bed to chair [] [] [] [] [] [] [x] [] [] [] [x] I=Independent, Mod I=Modified Independent, S=Supervision, SBA=Standby Assistance, CGA=Contact Guard Assistance,  
Min=Minimal Assistance, Mod=Moderate Assistance, Max=Maximal Assistance, Total=Total Assistance, NT=Not Tested BALANCE: Good Fair+ Fair Fair- Poor NT Comments Sitting Static [x] [] [] [] [] [] Sitting Dynamic [] [x] [] [] [] []   
         
Standing Static [] [] [] [x] [] []   
Standing Dynamic [] [] [] [x] [] [] PLAN:  
FREQUENCY/DURATION: OT Plan of Care: 3 times/week for duration of hospital stay or until stated goals are met, whichever comes first. 
 
TREATMENT:  
TREATMENT:  
($$ Self Care/Home Management: 38-52 mins    ) Co-Treatment PT/OT necessary due to patient's decreased overall endurance/tolerance levels, as well as need for high level skilled assistance to complete functional transfers/mobility and functional tasks Self Care (42 Minutes): Self care including Upper Body Bathing, Lower Body Bathing, Upper Body Dressing, Lower Body Dressing, Personal Device Care, Grooming and bed mobility, sitting balance, and transfers to increase independence and decrease level of assistance required. TREATMENT GRID: 
N/A 
 
AFTER TREATMENT POSITION/PRECAUTIONS: 
Alarm Activated, Chair, Needs within reach, RN notified and phlebotomy at bedsided INTERDISCIPLINARY COLLABORATION: 
RN/PCT and OT/PERALTA TOTAL TREATMENT DURATION: 
OT Patient Time In/Time Out Time In: 3024 Time Out: 1029 Aaron Martinez

## 2021-05-12 NOTE — PROGRESS NOTES
Pt is medically cleared for dc today and will return to room 326B at [facility] to resume STR services. RN to call report to #[phone]. Transport scheduled for 1600 through [employer]. Pt was notified of transport time and SW spoke with pt's dtr, Larna Mohs, via phone, to update her as well. SW remains available to assist as needed. Care Management Interventions PCP Verified by CM: Yes Mode of Transport at Discharge: BLS([facility]- facility could not accept the pt until after 1500 today) Transition of Care Consult (CM Consult): SNF Partner SNF: No 
Reason Why Partner SNF Not Chosen: Positive previous encounter Discharge Durable Medical Equipment: No 
Physical Therapy Consult: Yes Occupational Therapy Consult: Yes Speech Therapy Consult: No 
Current Support Network: Family Lives Clint 3 Confirm Follow Up Transport: Family The Plan for Transition of Care is Related to the Following Treatment Goals : Return to SNF to resume STR services to improve pt's functional abilities/strength The Patient and/or Patient Representative was Provided with a Choice of Provider and Agrees with the Discharge Plan?: Yes Freedom of Choice List was Provided with Basic Dialogue that Supports the Patient's Individualized Plan of Care/Goals, Treatment Preferences and Shares the Quality Data Associated with the Providers? : (pt requested to return to facility she was admitted from) Discharge [facility]

## 2021-05-12 NOTE — PROGRESS NOTES
GI DAILY PROGRESS NOTE Admit Date:  5/5/2021 Today's Date:  5/12/2021 CC:  Elevated LFTs, Abnormal U/S Subjective:  
 
Patient feeling well, no abdominal pain. No nausea or vomiting. Medications:  
Current Facility-Administered Medications Medication Dose Route Frequency  traMADoL (ULTRAM) tablet 50 mg  50 mg Oral BID PRN  
 furosemide (LASIX) tablet 20 mg  20 mg Oral DAILY  bisacodyL (DULCOLAX) suppository 10 mg  10 mg Rectal DAILY PRN  
 alcohol 62% (NOZIN) nasal  1 Ampule  1 Ampule Topical Q12H  
 sodium chloride (NS) flush 5-40 mL  5-40 mL IntraVENous Q8H  
 sodium chloride (NS) flush 5-40 mL  5-40 mL IntraVENous PRN  polyethylene glycol (MIRALAX) packet 17 g  17 g Oral DAILY PRN  promethazine (PHENERGAN) tablet 12.5 mg  12.5 mg Oral Q6H PRN Or  
 ondansetron (ZOFRAN) injection 4 mg  4 mg IntraVENous Q6H PRN  
 aspirin delayed-release tablet 81 mg  81 mg Oral DAILY  [Held by provider] atorvastatin (LIPITOR) tablet 40 mg  40 mg Oral QHS  clopidogreL (PLAVIX) tablet 75 mg  75 mg Oral DAILY  gabapentin (NEURONTIN) capsule 100 mg  100 mg Oral TID  [Held by provider] losartan (COZAAR) tablet 100 mg  100 mg Oral DAILY  metoprolol succinate (TOPROL-XL) XL tablet 12.5 mg  12.5 mg Oral DAILY  insulin lispro (HUMALOG) injection   SubCUTAneous AC&HS Review of Systems: 
No chest pain or SOB. Objective:  
Vitals: 
Visit Vitals BP (!) 144/68 (BP 1 Location: Left arm) Pulse (!) 59 Temp 98 °F (36.7 °C) Resp 14 Ht 5' 5\" (1.651 m) Wt 68.8 kg (151 lb 11.2 oz) SpO2 100% BMI 25.24 kg/m² Intake/Output: 
No intake/output data recorded. 05/10 1901 - 05/12 0700 In: 480 [P.O.:480] Out: 750 [Urine:750] Exam: 
General appearance: alert, cooperative, no distress Abd: nontender, soft, normal bowel sounds Neuro:  Alert and oriented without obvious neurological deficits. Data Review (Labs):   
Recent Labs   05/12/21 
0510 05/11/21 
1310 05/11/21 
1158 05/11/21 
0525 05/10/21 
4050 WBC 6.4  --   --  6.3 4.9 HGB 7.8*  --   --  8.5* 7.7* HCT 25.1*  --   --  26.1* 24.8*  
*  --   --  160 136* .7*  --   --  99.2* 103.8*   --   --  140 145  
K 5.0  --  5.5* 6.2* 4.6   --   --  105 110* CO2 35*  --   --  32 33* BUN 53*  --   --  52* 51* CREA 1.76*  --   --  1.82* 1.56* CA 9.3  --   --  9.3 8.9 *  --   --  264* 126* *  --   --  487* 138* *  --   --  1,202* 99* TBILI 0.7  --   --  1.0 0.3 PTP  --  13.5  --   --   --   
INR  --  1.0  --   --   -- MRI ABD W WO CONTRAST 5/8/21 FINDINGS: 
- LIVER: Normal in size and appearance. - GALLBLADDER/BILE DUCTS: Postcholecystectomy. There is moderate bile duct 
dilatation. Common duct measures 15 mm. No discrete stricture or stone is 
seen. - PANCREAS: Mild pancreatic duct dilatation, 5 mm. No discrete pancreas mass is 
no evidence of acute pancreas inflammation. 
- SPLEEN: Normal. 
- ADRENALS: Normal. 
- KIDNEYS/URETERS: No hydronephrosis or significant mass. 
- LYMPH NODES: No significant retroperitoneal or mesenteric adenopathy. 
- BONES: No fracture or significant bone lesion. 
- OTHER: No significant ascites. 
  
IMPRESSION Moderate bile duct dilatation. No obstructing stone or mass is 
seen. Assessment:  
 
Principal Problem: 
  Acute hypoxemic respiratory failure ([facility] (12/24/2020) Active Problems: 
  Coronary artery disease involving coronary bypass graft of native heart without angina pectoris (6/15/2015) Uncontrolled type 2 diabetes mellitus with hyperglycemia ([facility] (6/15/2015) Essential hypertension (6/15/2015) HLD (hyperlipidemia) (6/15/2015) Chronic kidney disease, stage III (moderate) (HCC) (6/15/2015) Metabolic encephalopathy (6/30/0717) Pulmonary HTN ([facility] (5/5/2021) Hyperkalemia (5/5/2021) UTI (urinary tract infection) (5/5/2021)   Chronic hypoxemic respiratory failure ([facility] [address] (5/5/2021) Hypercapnemia (5/5/2021) Abnormal LFTs (5/5/2021) Constipation (5/6/2021) Plan:  
 
 
  
Pleasant 77yo AAF with CAD s/p CABG on plavix, pulmonary HTN, CKD stage III, DM type2, and chronic respiratory failure  presenting with CHF and UTI, found to have elevated LFTs and abnormal U/S. Her LFTs could be elevated due to hepatic congestion from her CHF and dilated CBD could be due to hx of CCY. However, there is possible PD dilation as well (?double duct sign?) which raises the possibility of a stricture or malignancy. MRI showed 15 mm CBD and 5 mm PD without obvious stricture, stone or mass. ERCP 5/10/21 revealed very dilated bile duct to 15 mm wtihout obvious biliary stricture. Sphincterotomy performed. Acute rise in transaminases on 5/11 likely related to ischemia and a little better today. Patient asymptomatic. 
 
-could consider outpatient EUS to definitively r/o mass given double duct sign. Can discuss with patient and daughter in the office in the future when she recovers SL[facility] [place]

## 2021-05-30 PROBLEM — G25.3 MYOCLONUS: Chronic | Status: ACTIVE | Noted: 2021-01-01

## 2021-05-30 PROBLEM — Z86.16 HISTORY OF COVID-19: Chronic | Status: ACTIVE | Noted: 2021-01-01

## 2021-05-30 NOTE — ED PROVIDER NOTES
78-year-old -American female sent from rehab facility due to increased tremors and weakness. Patient is complaining of some pain to her legs but denies other complaints. No chest pain, shortness of breath, abdominal pain or vomiting. History from the patient is very difficult due to speech difficulty. The history is provided by the patient. Other Pertinent negatives include no chest pain, no abdominal pain and no shortness of breath. Past Medical History:  
Diagnosis Date  Acute cystitis without hematuria 1/30/2019  Acute pancreatitis 8/12/2019  CAD (coronary artery disease)  Diabetic ketoacidosis ([facility] 3/8/2020  DM2 (diabetes mellitus, type 2) ([facility]  Elevated liver function tests 8/8/2019  Encephalopathy 2/7/2021  Gout  Gram-negative bacteremia 8/9/2019  HLD (hyperlipidemia)  HTN (hypertension)  Peripheral neuropathy  Right lower quadrant abdominal pain 8/8/2019 Past Surgical History:  
Procedure Laterality Date  HX CHOLECYSTECTOMY jennifer in 1964  HX CORONARY ARTERY BYPASS GRAFT    
 x3  
 HX TUBAL LIGATION    
 IR INSERT NON TUNL CVC OVER 5 YRS  1/11/2021  [place] CARDIAC SURG PROCEDURE UNLIST    
 stents x 3 2009 Family History:  
Problem Relation Age of Onset  Hypertension Mother  Cancer Mother  Diabetes Mother  Heart Disease Mother Social History Socioeconomic History  Marital status: SINGLE Spouse name: Not on file  Number of children: Not on file  Years of education: Not on file  Highest education level: Not on file Occupational History  Occupation: retired  Tobacco Use  Smoking status: Never Smoker  Smokeless tobacco: Never Used Substance and Sexual Activity  Alcohol use: Yes Comment: socially  Drug use: No  
 Sexual activity: Not on file Other Topics Concern  Not on file Social History Narrative  Lives alone but has a caregiver who helps several hours per day, several days per week Social Determinants of Health Financial Resource Strain:  Difficulty of Paying Living Expenses:   
Food Insecurity:  Worried About [address] in the Last Year:   
[address] in the Last Year:   
Transportation Needs:   
 Lack of Transportation (Medical):  Lack of Transportation (Non-Medical): Physical Activity:   
 Days of Exercise per Week:  Minutes of Exercise per Session:   
Stress:  Feeling of Stress :   
Social Connections:  Frequency of Communication with Friends and Family:  Frequency of Social Gatherings with Friends and Family:  Attends Rastafarian Services:  Active Member of Clubs or Organizations:  Attends Club or Organization Meetings:  Marital Status: Intimate Partner Violence:  Fear of Current or Ex-Partner:  Emotionally Abused:   
 Physically Abused:   
 Sexually Abused: ALLERGIES: Sulfa (sulfonamide antibiotics) Review of Systems Constitutional: Negative for fever. HENT: Negative for congestion. Respiratory: Negative for cough and shortness of breath. Cardiovascular: Negative for chest pain. Gastrointestinal: Negative for abdominal pain and vomiting. Skin: Negative for rash. Neurological: Positive for weakness. All other systems reviewed and are negative. Vitals:  
 05/30/21 1818 05/30/21 1819 05/30/21 1823 BP: (!) 189/85 (!) 189/85 Pulse: 66 65 Resp: 16 22 Temp: 98.7 °F (37.1 °C) 98.5 °F (36.9 °C) SpO2: 100% 100% 100% Weight: 68.9 kg (152 lb) Height: 5' 5\" (1.651 m) Physical Exam 
Vitals and nursing note reviewed. Constitutional:   
   General: She is not in acute distress. Appearance: Normal appearance. She is not toxic-appearing. HENT:  
   Head: Normocephalic and atraumatic.   
   Nose: Nose normal.  
   Mouth/Throat:  
   Mouth: Mucous membranes are moist.  
   Pharynx: Oropharynx is clear.  
Eyes:  
   Pupils: Pupils are equal, round, and reactive to light. Cardiovascular:  
   Rate and Rhythm: Normal rate and regular rhythm. Pulmonary:  
   Effort: Pulmonary effort is normal.  
   Breath sounds: Normal breath sounds. Abdominal:  
   General: There is no distension. Palpations: Abdomen is soft. Tenderness: There is no abdominal tenderness. There is no guarding. Musculoskeletal:  
   Cervical back: Normal range of motion. Skin: 
   General: Skin is warm and dry. Neurological:  
   Mental Status: She is alert. Comments: Patient is oriented. Speech is very difficult to understand due to facial tremors/tics. Unable to determine if this is baseline. Psychiatric:     
   Mood and Affect: Mood normal.     
   Behavior: Behavior normal.  
 
  
 
MDM Number of Diagnoses or Management Options Diagnosis management comments: Lab work shows stable anemia hemoglobin 8.9 hematocrit 28.6. Hyperkalemia 6.5 without hemolysis. Acute on chronic renal insufficiency with creatinine 1.87 BUN 46. Urinalysis has greater than 100 white cells with trace bacteria. Culture is pending. EKG shows normal sinus rhythm rate 64 T waves appear mildly peaked. Patient is being treated with IV fluids, insulin and glucose. Also being treated with Rocephin for possible UTI. Hospitalist consulted for admission. Amount and/or Complexity of Data Reviewed Clinical lab tests: reviewed and ordered Tests in the medicine section of CPT®: ordered and reviewed Independent visualization of images, tracings, or specimens: yes Risk of Complications, Morbidity, and/or Mortality Presenting problems: moderate Diagnostic procedures: moderate Management options: moderate EKG Date/Time: 5/30/2021 7:51 PM 
Performed by: Luba Celeste MD 
Authorized by: Luba Celeste MD  
 
Interpretation: Interpretation: abnormal   
Rate:  
  ECG rate assessment: normal   
Rhythm: Rhythm: sinus rhythm T waves:  
  T waves: peaked

## 2021-05-30 NOTE — ED TRIAGE NOTES
Patient arrived via EMS form [facility] Facility nurse stated patient began having tremors today. Patient has a chronic hx of tremors. /84, HR 68bpm, temp 97.8F oral, oral 99% on 2LNC. A+O to name.

## 2021-05-31 NOTE — PROGRESS NOTES
Neurology Daily Progress Note Assessment:  
 
57-year-old woman with history of recent hospitalization for cardiac arrest and acute respiratory distress syndrome secondary to COVID-19 infection. 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 Overall, she has made a decent recovery. She now presents with mild change in mental status and myoclonus. Myoclonus is typically caused by metabolic and/or toxic encephalopathy. Common causes include hyperammonemia, hypercarbia, kidney dysfunction, and the toxic side effects of medications such as gabapentin. Currently, no myoclonus noted Plan:  
 
Hold gabapentin. Gabapentin should not be dosed 3 times daily in the setting of kidney dysfunction. Currently, no myoclonus. If myoclonus worsens then I recommend checking a blood gas Subjective: Interval history: She is awake and alert and follows simple commands History: 
 
Leena Estrada is a 78 y.o. female who is being seen for AMS. Unable to obtain ROS due to AMS. Objective:  
 
Vitals:  
 05/30/21 2125 05/31/21 0012 05/31/21 0407 05/31/21 4560 BP: (!) 140/69 (!) 132/58 139/60 (!) 169/81 Pulse: 61 (!) 49 (!) 50 (!) 53 Resp: 20 16 20 22 Temp: 97.4 °F (36.3 °C) 97.4 °F (36.3 °C) 97.4 °F (36.3 °C) 97.5 °F (36.4 °C) SpO2: 97% 98% 99% 99% Weight: 138 lb 8 oz (62.8 kg)  128 lb 1.6 oz (58.1 kg) Height: 5' 5\" (1.651 m) Current Facility-Administered Medications:  
  alcohol 62% (NOZIN) nasal  1 Ampule, 1 Ampule, Topical, Q12H, Hyacinth Aiken MD, 1 Ampule at 05/31/21 0840 
  sodium chloride (NS) flush 5-40 mL, 5-40 mL, IntraVENous, Q8H, Hyacinth Aiken MD, 10 mL at 05/31/21 9849   sodium chloride (NS) flush 5-40 mL, 5-40 mL, IntraVENous, PRN, Rogelio Aiken MD 
  cefTRIAXone (ROCEPHIN) 1 g in 0.9% sodium chloride (MBP/ADV) 50 mL MBP, 1 g, IntraVENous, Q24H, Pamella Rubinstein, MD 
  ascorbic acid (vitamin C) (VITAMIN C) tablet 500 mg, 500 mg, Oral, EVERY OTHER DAY, Eulalia Aiken MD, 500 mg at 05/30/21 2215   aspirin delayed-release tablet 81 mg, 81 mg, Oral, DAILY, Eulalia Aiken MD, 81 mg at 05/31/21 0840   clopidogreL (PLAVIX) tablet 75 mg, 75 mg, Oral, DAILY, Eulalia Aiken MD, 75 mg at 05/31/21 0840   ferrous sulfate tablet 325 mg, 1 Tablet, Oral, EVERY OTHER DAY, Eulalia Aiken MD, 325 mg at 05/30/21 2215   [Held by provider] insulin glargine (LANTUS) injection 5 Units, 5 Units, SubCUTAneous, DAILY, Eulalia Aiken MD 
  [Held by provider] metoprolol succinate (TOPROL-XL) XL tablet 12.5 mg, 12.5 mg, Oral, DAILY, Eulalia Aiken MD 
  pantoprazole (PROTONIX) tablet 40 mg, 40 mg, Oral, ACB, Hyacinth Aiken MD, 40 mg at 05/31/21 0840 
  sodium chloride (NS) flush 5-40 mL, 5-40 mL, IntraVENous, Q8H, Hyacinth Aiken MD, 10 mL at 05/31/21 0646   sodium chloride (NS) flush 5-40 mL, 5-40 mL, IntraVENous, PRN, Eulalia Aiken MD 
  acetaminophen (TYLENOL) tablet 650 mg, 650 mg, Oral, Q6H PRN **OR** acetaminophen (TYLENOL) suppository 650 mg, 650 mg, Rectal, Q6H PRN, Eulalia Aiken MD 
  polyethylene glycol (MIRALAX) packet 17 g, 17 g, Oral, DAILY PRN, Eulalia Aiken MD 
  enoxaparin (LOVENOX) injection 30 mg, 30 mg, SubCUTAneous, DAILY, Karen Aiken MD, 30 mg at 05/31/21 0840   ondansetron (ZOFRAN) injection 4 mg, 4 mg, IntraVENous, Q6H PRN, Eulalia Aiken MD 
  insulin lispro (HUMALOG) injection, , SubCUTAneous, AC&HS, Eulalia Aiken MD 
  hydrALAZINE (APRESOLINE) 20 mg/mL injection 10 mg, 10 mg, IntraVENous, Q6H PRN, Eulalia Aiken MD 
 
Recent Results (from the past 12 hour(s)) GLUCOSE, POC Collection Time: 05/30/21 10:32 PM  
Result Value Ref Range Glucose (POC) 96 65 - 100 mg/dL  Performed by Melina KING Collection Time: 05/31/21  1:12 AM  
Result Value Ref Range Potassium 5.9 (H) 3.5 - 5.1 mmol/L  
GLUCOSE, POC Collection Time: 05/31/21  5:05 AM  
Result Value Ref Range Glucose (POC) 106 (H) 65 - 100 mg/dL Performed by Preston METABOLIC PANEL, COMPREHENSIVE Collection Time: 05/31/21  5:06 AM  
Result Value Ref Range Sodium 144 136 - 145 mmol/L Potassium 5.6 (H) 3.5 - 5.1 mmol/L Chloride 113 (H) 98 - 107 mmol/L  
 CO2 30 21 - 32 mmol/L Anion gap 1 (L) 7 - 16 mmol/L Glucose 97 65 - 100 mg/dL BUN 43 (H) 8 - 23 MG/DL Creatinine 1.71 (H) 0.6 - 1.0 MG/DL  
 GFR est AA 37 (L) >60 ml/min/1.73m2 GFR est non-AA 30 (L) >60 ml/min/1.73m2 Calcium 9.0 8.3 - 10.4 MG/DL Bilirubin, total 0.3 0.2 - 1.1 MG/DL  
 ALT (SGPT) 28 12 - 65 U/L  
 AST (SGOT) 14 (L) 15 - 37 U/L Alk. phosphatase 148 (H) 50 - 136 U/L Protein, total 6.8 6.3 - 8.2 g/dL Albumin 3.0 (L) 3.2 - 4.6 g/dL Globulin 3.8 (H) 2.3 - 3.5 g/dL A-G Ratio 0.8 (L) 1.2 - 3.5    
CBC WITH AUTOMATED DIFF Collection Time: 05/31/21  5:06 AM  
Result Value Ref Range WBC 5.6 4.3 - 11.1 K/uL  
 RBC 2.60 (L) 4.05 - 5.2 M/uL HGB 8.4 (L) 11.7 - 15.4 g/dL HCT 27.3 (L) 35.8 - 46.3 % .0 (H) 79.6 - 97.8 FL  
 MCH 32.3 26.1 - 32.9 PG  
 MCHC 30.8 (L) 31.4 - 35.0 g/dL  
 RDW 15.0 (H) 11.9 - 14.6 % PLATELET 575 215 - 803 K/uL MPV 10.2 9.4 - 12.3 FL ABSOLUTE NRBC 0.00 0.0 - 0.2 K/uL  
 DF AUTOMATED NEUTROPHILS 53 43 - 78 % LYMPHOCYTES 31 13 - 44 % MONOCYTES 11 4.0 - 12.0 % EOSINOPHILS 4 0.5 - 7.8 % BASOPHILS 1 0.0 - 2.0 % IMMATURE GRANULOCYTES 0 0.0 - 5.0 %  
 ABS. NEUTROPHILS 2.9 1.7 - 8.2 K/UL  
 ABS. LYMPHOCYTES 1.7 0.5 - 4.6 K/UL  
 ABS. MONOCYTES 0.6 0.1 - 1.3 K/UL  
 ABS. EOSINOPHILS 0.2 0.0 - 0.8 K/UL  
 ABS. BASOPHILS 0.1 0.0 - 0.2 K/UL  
 ABS. IMM. GRANS. 0.0 0.0 - 0.5 K/UL Physical Exam: 
General - Well developed, well nourished, in no apparent distress HEENT - Normocephalic, atraumatic. Conjunctiva are clear. Neck - Supple without masses Extremities - Peripheral pulses intact. No edema and no rashes. Neurological examination - Comprehension is intact to one-step commands. Language and speech are normal. On cranial nerve examination pupils are equal round and reactive to light (cranial nerve II and III). Visual acuity is adequate (cranial nerve II). Visual fields are full to finger confrontation (CN II). Extraocular motility is normal (CN III, IV, VI). Face is symmetric (CN VII). Hearing is grossly intact to verbal communication (CN VIII). Motor examination - There is normal bulk. Power is full throughout the bilateral upper extremities. 4/5 strength in all major muscle groups in the bilateral lower limbs. . Cerebellar examination is normal with finger-no-nose testing. Gait and stance not assessed due to fall risk. No tremor. No myoclonus. Signed By: Megan Wing DO May 31, 2021

## 2021-05-31 NOTE — ROUTINE PROCESS
TRANSFER - IN REPORT: 
 
[facility]  for routine progression of care. Report consisted of patients Situation, Background, Assessment and Recommendations(SBAR). Information from the following report(s) SBAR, Kardex, ED Summary, [facility] and Cardiac Rhythm SR was reviewed. Opportunity for questions and clarification was provided. Assessment completed upon patients arrival to unit and care assumed. Patient received to room 318. Patient connected to monitor and assessment completed. Plan of care reviewed. Patient oriented to room and call light. Patient aware to use call light to communicate any chest pain or needs. Admission skin assessment completed with second RN and reveals the following: Small reddened rash noted to bilateral thighs. Otherwise, skin c/d/i. IV present in L ac. Sacrum/coccyx visualized, c/d/i.

## 2021-05-31 NOTE — ED NOTES
TRANSFER - OUT REPORT: 
 
Verbal report given to Raul RN(name) on Jose Manuel Rivera  being transferred to [facility](unit) for routine progression of care Report consisted of patients Situation, Background, Assessment and  
Recommendations(SBAR). Information from the following report(s) SBAR, ED Summary, [facility] and Recent Results was reviewed with the receiving nurse. Lines:  
Peripheral IV 05/30/21 Left Antecubital (Active) Site Assessment Clean, dry, & intact 05/30/21 1822 Phlebitis Assessment 0 05/30/21 1822 Infiltration Assessment 0 05/30/21 1822 Dressing Status Clean, dry, & intact 05/30/21 1822 Opportunity for questions and clarification was provided. Patient transported with: 
 Monitor

## 2021-05-31 NOTE — PROGRESS NOTES
[facility] Progress Note Name:  Zoey Benson  Age:81 y.o. Sex:female :  1940 MRN:  [id number] Admit Date:  2021 Reason for Admission: Hyperkalemia [E87.5] Hospital Course/Interval history: This is a 78 y.o. female with medical history of HTN, HFpEF, DM2, CKD3 with prior short term dialysis, COPD on 2 L NC with chronic hypoxic respiratory failure, COVID 2020 s/p trache / decanulated, pulmonary HTN, who is evaluated with tremors. She currently is a longterm resident at [facility]. Since her prior COVID illness  she has had numerous complications and admissions. She was discharged to [facility] on 21 after a stay for encephalopathy, Klebsiella UTI, workup of elevated LFTs, volume overload, hyperkalemia. She is pending outpatient EUS per GI on 21. Her losartan was stopped. She was resumed on oral lasix. Per daughter, Rosario Diamond at her bedside, she was in her baseline health but was noted to have increased tremors and extremity jerking. She did have some generalized weakness and is essentially wheelchair bound since COVID with extremity jerking/ tremors and some mouth Tremors. Upon visiting today, Rosario Lobo noted she seemed worse and had her sent to the ED. She has potassium 6.5 and UTI. She has received insulin/ dextrose/ IVF bolus. EKG shows mildly peaked T waves. Losartan dose was held. Stephanie Marisela was given. Patient was evaluated by neurology. Recommendations to hold gabapentin. Currently no myoclonus. Subjective (21): Patient is alert awake sitting in chair. She does not know why she is here in the hospital.  No fever. Unable to obtain review of systems secondary to her mental status. Review of Systems: 14 point review of systems is otherwise negative with the exception of the elements mentioned above. Assessment & Plan This is a 68-year-old female with:  
 
Hyperkalemia Losartan on hold. Potassium was 6.5 on admission. She received insulin dextrose IV fluids. Recheck potassium this morning is 5.6. Ordered Lokelma x1. Plan to repeat BMP this afternoon. Chronic kidney disease stage III POA Creatinine is 1.7 this morning at baseline. Urinary tract infection POA Urine cultures pending. Continue ceftriaxone. Myoclonus/tremors Hold Neurontin. Neurology consulted. Appreciate recommendations. No myoclonus at this time. As outpatient patient is currently on gabapentin 100 mg 3 times daily. But with CKD stage III, Neurology recommends decreasing frequency of gabapentin. Blood cultures 5/30 negative. Chronic hypoxemic respiratory failure from COPD Baseline oxygen not in acute exacerbation. Congestive heart failure with preserved ejection fraction/chronic diastolic CHF/pulmonary hypertension Continue metoprolol. Diabetes mellitus type 2 Sliding scale insulin. Lantus held due to low blood glucose last night. Transaminitis improved Statin held. Outpatient EUS per GI. Chronic anemia Hemoglobin stable at 8.4. No active bleeding. Generalized debility PT/OT. Long-term resident at rehab. Diet:  DIET DIABETIC CONSISTENT CARB 
DVT PPx: Lovenox Code status: DNR Disposition/Expected LOS: Anticipate inpatient hospital stay for at least 24 hours. Long-term care on discharge. Objective:  
 
Patient Vitals for the past 24 hrs: 
 Temp Pulse Resp BP SpO2  
05/31/21 0849 97.5 °F (36.4 °C) (!) 53 22 (!) 169/81 99 % 05/31/21 0407 97.4 °F (36.3 °C) (!) 50 20 139/60 99 % 05/31/21 0012 97.4 °F (36.3 °C) (!) 49 16 (!) 132/58 98 % 05/30/21 2125 97.4 °F (36.3 °C) 61 20 (!) 140/69 97 % 05/30/21 1823     100 % 05/30/21 1819 98.5 °F (36.9 °C) 65 22 (!) 189/85 100 % 05/30/21 1818 98.7 °F (37.1 °C) 66 16 (!) 189/85 100 % Oxygen Therapy O2 Sat (%): 99 % (05/31/21 0849) Pulse via Oximetry: 65 beats per minute (05/30/21 1819) O2 Device: Nasal cannula (05/31/21 0837) Skin Assessment: Clean, dry, & intact (05/31/21 0400) O2 Flow Rate (L/min): 2 l/min (05/31/21 0837) Body mass index is 21.32 kg/m². Physical Exam:  
General:  No acute distress, speaking in full sentences, no use of accessory muscles Lungs:  Clear to auscultation bilaterally, no wheezing, no crackles, CV:  Regular rate and rhythm with normal S1 and S2, no murmur, rubs or gallops, Abdomen:  Soft, nontender, nondistended, normoactive bowel sounds Extremities:  No cyanosis clubbing or edema Neuro:  Nonfocal, A&O x3  
Psych:  Normal affect Data Review: 
I have reviewed all labs, meds, and studies from the last 24 hours: 
 
Labs: 
 
Recent Results (from the past 24 hour(s)) CBC WITH AUTOMATED DIFF Collection Time: 05/30/21  6:31 PM  
Result Value Ref Range WBC 7.1 4.3 - 11.1 K/uL  
 RBC 2.74 (L) 4.05 - 5.2 M/uL HGB 8.9 (L) 11.7 - 15.4 g/dL HCT 28.6 (L) 35.8 - 46.3 % .4 (H) 79.6 - 97.8 FL  
 MCH 32.5 26.1 - 32.9 PG  
 MCHC 31.1 (L) 31.4 - 35.0 g/dL  
 RDW 15.2 (H) 11.9 - 14.6 % PLATELET 688 035 - 594 K/uL MPV 10.7 9.4 - 12.3 FL ABSOLUTE NRBC 0.00 0.0 - 0.2 K/uL  
 DF AUTOMATED NEUTROPHILS 61 43 - 78 % LYMPHOCYTES 27 13 - 44 % MONOCYTES 9 4.0 - 12.0 % EOSINOPHILS 3 0.5 - 7.8 % BASOPHILS 1 0.0 - 2.0 % IMMATURE GRANULOCYTES 0 0.0 - 5.0 %  
 ABS. NEUTROPHILS 4.4 1.7 - 8.2 K/UL  
 ABS. LYMPHOCYTES 1.9 0.5 - 4.6 K/UL  
 ABS. MONOCYTES 0.6 0.1 - 1.3 K/UL  
 ABS. EOSINOPHILS 0.2 0.0 - 0.8 K/UL  
 ABS. BASOPHILS 0.1 0.0 - 0.2 K/UL  
 ABS. IMM. GRANS. 0.0 0.0 - 0.5 K/UL METABOLIC PANEL, BASIC Collection Time: 05/30/21  6:31 PM  
Result Value Ref Range Sodium 144 136 - 145 mmol/L Potassium 6.5 (HH) 3.5 - 5.1 mmol/L Chloride 112 (H) 98 - 107 mmol/L  
 CO2 29 21 - 32 mmol/L Anion gap 3 (L) 7 - 16 mmol/L Glucose 93 65 - 100 mg/dL BUN 46 (H) 8 - 23 MG/DL Creatinine 1.87 (H) 0.6 - 1.0 MG/DL  
 GFR est AA 33 (L) >60 ml/min/1.73m2  GFR est non-AA 27 (L) >60 ml/min/1.73m2 Calcium 9.4 8.3 - 10.4 MG/DL  
HEPATIC FUNCTION PANEL Collection Time: 05/30/21  6:31 PM  
Result Value Ref Range Protein, total 7.5 6.3 - 8.2 g/dL Albumin 3.2 3.2 - 4.6 g/dL Globulin 4.3 (H) 2.3 - 3.5 g/dL A-G Ratio 0.7 (L) 1.2 - 3.5 Bilirubin, total 0.3 0.2 - 1.1 MG/DL Bilirubin, direct 0.1 <0.4 MG/DL Alk. phosphatase 164 (H) 50 - 136 U/L  
 AST (SGOT) 18 15 - 37 U/L  
 ALT (SGPT) 31 12 - 65 U/L  
MAGNESIUM Collection Time: 05/30/21  6:31 PM  
Result Value Ref Range Magnesium 2.2 1.8 - 2.4 mg/dL LACTIC ACID Collection Time: 05/30/21  7:06 PM  
Result Value Ref Range Lactic acid 0.7 0.4 - 2.0 MMOL/L  
URINE MICROSCOPIC Collection Time: 05/30/21  7:10 PM  
Result Value Ref Range WBC >100 0 /hpf  
 RBC 5-10 0 /hpf Epithelial cells 0 0 /hpf Bacteria TRACE 0 /hpf Casts 0 0 /lpf Crystals, urine 0 0 /LPF Mucus 0 0 /lpf CULTURE, URINE Collection Time: 05/30/21  7:10 PM  
 Specimen: Cath Urine Result Value Ref Range Special Requests: NO SPECIAL REQUESTS Culture result:     
  NO GROWTH AFTER SHORT PERIOD OF INCUBATION. FURTHER RESULTS TO FOLLOW AFTER OVERNIGHT INCUBATION. EKG, 12 LEAD, INITIAL Collection Time: 05/30/21  7:48 PM  
Result Value Ref Range Ventricular Rate 64 BPM  
 Atrial Rate 64 BPM  
 P-R Interval 196 ms QRS Duration 90 ms Q-T Interval 406 ms QTC Calculation (Bezet) 418 ms Calculated P Axis 57 degrees Calculated R Axis 76 degrees Calculated T Axis 102 degrees Diagnosis    
  !! AGE AND GENDER SPECIFIC ECG ANALYSIS !! Normal sinus rhythm Cannot rule out Anterior infarct (cited on or before 16-APR-2021) Abnormal ECG When compared with ECG of 30-MAY-2021 19:41, No significant change was found Confirmed by Love Mcgee ([id number]) on 5/31/2021 9:58:26 AM 
  
CULTURE, BLOOD Collection Time: 05/30/21  8:25 PM  
 Specimen: Blood Result Value Ref Range  Special Requests: RIGHT FOREARM Culture result: NO GROWTH AFTER 12 HOURS    
CULTURE, BLOOD Collection Time: 05/30/21  9:14 PM  
 Specimen: Blood Result Value Ref Range Special Requests: LEFT 
HAND Culture result: NO GROWTH AFTER 12 HOURS    
GLUCOSE, POC Collection Time: 05/30/21 10:02 PM  
Result Value Ref Range Glucose (POC) 53 (L) 65 - 100 mg/dL Performed by [id number] GLUCOSE, POC Collection Time: 05/30/21 10:32 PM  
Result Value Ref Range Glucose (POC) 96 65 - 100 mg/dL Performed by Melina   
POTASSIUM Collection Time: 05/31/21  1:12 AM  
Result Value Ref Range Potassium 5.9 (H) 3.5 - 5.1 mmol/L  
GLUCOSE, POC Collection Time: 05/31/21  5:05 AM  
Result Value Ref Range Glucose (POC) 106 (H) 65 - 100 mg/dL Performed by Preston METABOLIC PANEL, COMPREHENSIVE Collection Time: 05/31/21  5:06 AM  
Result Value Ref Range Sodium 144 136 - 145 mmol/L Potassium 5.6 (H) 3.5 - 5.1 mmol/L Chloride 113 (H) 98 - 107 mmol/L  
 CO2 30 21 - 32 mmol/L Anion gap 1 (L) 7 - 16 mmol/L Glucose 97 65 - 100 mg/dL BUN 43 (H) 8 - 23 MG/DL Creatinine 1.71 (H) 0.6 - 1.0 MG/DL  
 GFR est AA 37 (L) >60 ml/min/1.73m2 GFR est non-AA 30 (L) >60 ml/min/1.73m2 Calcium 9.0 8.3 - 10.4 MG/DL Bilirubin, total 0.3 0.2 - 1.1 MG/DL  
 ALT (SGPT) 28 12 - 65 U/L  
 AST (SGOT) 14 (L) 15 - 37 U/L Alk. phosphatase 148 (H) 50 - 136 U/L Protein, total 6.8 6.3 - 8.2 g/dL Albumin 3.0 (L) 3.2 - 4.6 g/dL Globulin 3.8 (H) 2.3 - 3.5 g/dL A-G Ratio 0.8 (L) 1.2 - 3.5    
CBC WITH AUTOMATED DIFF Collection Time: 05/31/21  5:06 AM  
Result Value Ref Range WBC 5.6 4.3 - 11.1 K/uL  
 RBC 2.60 (L) 4.05 - 5.2 M/uL HGB 8.4 (L) 11.7 - 15.4 g/dL HCT 27.3 (L) 35.8 - 46.3 % .0 (H) 79.6 - 97.8 FL  
 MCH 32.3 26.1 - 32.9 PG  
 MCHC 30.8 (L) 31.4 - 35.0 g/dL  
 RDW 15.0 (H) 11.9 - 14.6 % PLATELET 501 322 - 427 K/uL MPV 10.2 9.4 - 12.3 FL  ABSOLUTE NRBC 0.00 0.0 - 0.2 K/uL  
 DF AUTOMATED NEUTROPHILS 53 43 - 78 % LYMPHOCYTES 31 13 - 44 % MONOCYTES 11 4.0 - 12.0 % EOSINOPHILS 4 0.5 - 7.8 % BASOPHILS 1 0.0 - 2.0 % IMMATURE GRANULOCYTES 0 0.0 - 5.0 %  
 ABS. NEUTROPHILS 2.9 1.7 - 8.2 K/UL  
 ABS. LYMPHOCYTES 1.7 0.5 - 4.6 K/UL  
 ABS. MONOCYTES 0.6 0.1 - 1.3 K/UL  
 ABS. EOSINOPHILS 0.2 0.0 - 0.8 K/UL  
 ABS. BASOPHILS 0.1 0.0 - 0.2 K/UL  
 ABS. IMM. GRANS. 0.0 0.0 - 0.5 K/UL All Micro Results Procedure Component Value Units Date/Time CULTURE, BLOOD [[id number]] Collected: 05/30/21 2025 Order Status: Completed Specimen: Blood Updated: 05/31/21 1012 Special Requests: --     
  RIGHT 
FOREARM Culture result: NO GROWTH AFTER 12 HOURS     
 CULTURE, BLOOD [[id number]] Collected: 05/30/21 2114 Order Status: Completed Specimen: Blood Updated: 05/31/21 1012 Special Requests: --     
  LEFT 
HAND Culture result: NO GROWTH AFTER 12 HOURS     
 CULTURE, URINE [[id number]] Collected: 05/30/21 1910 Order Status: Completed Specimen: Cath Urine Updated: 05/31/21 0305 Special Requests: NO SPECIAL REQUESTS Culture result:    
  NO GROWTH AFTER SHORT PERIOD OF INCUBATION. FURTHER RESULTS TO FOLLOW AFTER OVERNIGHT INCUBATION. EKG Results Procedure 720 Value Units Date/Time EKG, 12 LEAD, INITIAL [[id number]] Collected: 05/30/21 1948 Order Status: Completed Updated: 05/31/21 3138 Ventricular Rate 64 BPM   
  Atrial Rate 64 BPM   
  P-R Interval 196 ms QRS Duration 90 ms Q-T Interval 406 ms QTC Calculation (Bezet) 418 ms Calculated P Axis 57 degrees Calculated R Axis 76 degrees Calculated T Axis 102 degrees Diagnosis --  
  !! AGE AND GENDER SPECIFIC ECG ANALYSIS !! Normal sinus rhythm Cannot rule out Anterior infarct (cited on or before 16-APR-2021) Abnormal ECG When compared with ECG of 30-MAY-2021 19:41, No significant change was found Confirmed by Duran Cagle ([id number]) on 5/31/2021 9:58:26 AM 
  
  
 
 
Other Studies: No results found. Current Meds:  
Current Facility-Administered Medications Medication Dose Route Frequency  alcohol 62% (NOZIN) nasal  1 Ampule  1 Ampule Topical Q12H  
 sodium chloride (NS) flush 5-40 mL  5-40 mL IntraVENous Q8H  
 sodium chloride (NS) flush 5-40 mL  5-40 mL IntraVENous PRN  
 cefTRIAXone (ROCEPHIN) 1 g in 0.9% sodium chloride (MBP/ADV) 50 mL MBP  1 g IntraVENous Q24H  
 ascorbic acid (vitamin C) (VITAMIN C) tablet 500 mg  500 mg Oral EVERY OTHER DAY  aspirin delayed-release tablet 81 mg  81 mg Oral DAILY  clopidogreL (PLAVIX) tablet 75 mg  75 mg Oral DAILY  ferrous sulfate tablet 325 mg  1 Tablet Oral EVERY OTHER DAY  [Held by provider] insulin glargine (LANTUS) injection 5 Units  5 Units SubCUTAneous DAILY  [Held by provider] metoprolol succinate (TOPROL-XL) XL tablet 12.5 mg  12.5 mg Oral DAILY  pantoprazole (PROTONIX) tablet 40 mg  40 mg Oral ACB  sodium chloride (NS) flush 5-40 mL  5-40 mL IntraVENous Q8H  
 sodium chloride (NS) flush 5-40 mL  5-40 mL IntraVENous PRN  
 acetaminophen (TYLENOL) tablet 650 mg  650 mg Oral Q6H PRN Or  
 acetaminophen (TYLENOL) suppository 650 mg  650 mg Rectal Q6H PRN  polyethylene glycol (MIRALAX) packet 17 g  17 g Oral DAILY PRN  
 enoxaparin (LOVENOX) injection 30 mg  30 mg SubCUTAneous DAILY  ondansetron (ZOFRAN) injection 4 mg  4 mg IntraVENous Q6H PRN  
 insulin lispro (HUMALOG) injection   SubCUTAneous AC&HS  hydrALAZINE (APRESOLINE) 20 mg/mL injection 10 mg  10 mg IntraVENous Q6H PRN Problem List: 
Hospital Problems as of 5/31/2021 Date Reviewed: 4/19/2021 Codes Class Noted - Resolved POA Myoclonus (Chronic) ICD-10-CM: G25.3 ICD-9-CM: 333.2  5/30/2021 - Present Yes History of COVID-19 (Chronic) ICD-10-CM: Z86.16 
ICD-9-CM: V12.09  5/30/2021 - Present Yes  Diastolic CHF, chronic [facility]) ICD-10-CM: I50.32 
ICD-9-CM: 428.32, 428.0  5/5/2021 - Present Yes Pulmonary HTN ([address] ICD-10-CM: I27.20 ICD-9-CM: 416.8  5/5/2021 - Present Yes Hyperkalemia ICD-10-CM: E87.5 ICD-9-CM: 276.7  5/5/2021 - Present Unknown UTI (urinary tract infection) ICD-10-CM: N39.0 ICD-9-CM: 599.0  5/5/2021 - Present Yes Chronic hypoxemic respiratory failure [facility]) ICD-10-CM: J96.11 
ICD-9-CM: 518.83, 799.02  5/5/2021 - Present Yes Abnormal LFTs ICD-10-CM: R94.5 ICD-9-CM: 790.6  5/5/2021 - Present Yes Anemia ICD-10-CM: D64.9 ICD-9-CM: 285.9  8/8/2019 - Present Yes Uncontrolled type 2 diabetes mellitus with hyperglycemia (HCC) (Chronic) ICD-10-CM: E11.65 ICD-9-CM: 250.02  6/15/2015 - Present Yes Essential hypertension (Chronic) ICD-10-CM: I10 
ICD-9-CM: 401.9  6/15/2015 - Present Yes Chronic kidney disease, stage III (moderate) (HCC) (Chronic) ICD-10-CM: N18.30 ICD-9-CM: 585.3  6/15/2015 - Present Yes Part of this note was written by using a voice dictation software and the note has been proof read but may still contain some grammatical/other typographical errors. Signed By: Darell Ayala MD  
[employer] [facility] May 31, 2021

## 2021-05-31 NOTE — ROUTINE PROCESS
Bedside and Verbal shift change report given to self (oncoming nurse) by Eduardo Dixon (offgoing nurse). Report included the following information SBAR, Kardex, ED Summary, Procedure Summary, Intake/Output, MAR and Recent Results.

## 2021-05-31 NOTE — H&P
[facility]  
History and Physical  
 
 
Name:  Genell Gilford  Age:81 y.o. Sex:female :  1940 MRN:  [id number] PCP:  Ezequiel Ware MD 
 
 
Admit Date:  2021  6:11 PM  
Chief Complaint: tremors Reason for Admission: Hyperkalemia [E87.5] Assessment & Plan:  
 
 
CKD3, hyperkalemia: 
· Admit to tele · S/p insulin and dextrose · Will give one dose lokelma · Give one amp IV bicarb · Repeat potassium level · Gentle IVF for 6 hours · Assess to resume oral lasix tomorrow UTI: 
· Rocephin D1 
· followup culture Myoclonus, tremors: 
· Hold neurontin · Neurology consult Chronic hypoxia and COPD: 
· At baseline O2 needs HFpEF, pulmonary HTN: 
· Continued metoprolol · O2 as needed DM2: 
· SSI · Resume lantus Elevated LFTS: 
· Trending downward · Statin held · Has pending outpatient EUS Anemia: 
· Stable and chronic · Trend HGB Generalized debility: 
· PT,OT 
· longterm resident at [facility] Disposition: pending Diet: DIET DIABETIC CONSISTENT CARB 
VTE ppx: lovenox GI ppx: pepcid CODE STATUS: DNR Surrogate decision-maker: daughter Cee Holman [phone] History of Presenting Illness:  
 
Genell Gilford is a 78 y.o. female with medical history of HTN, HFpEF, DM2, CKD3 with prior short term dialysis, COPD on 2 L NC with chronic hypoxic respiratory failure, COVID 2020 s/p trache / decanulated, pulmonary HTN, who is evaluated with tremors. She currently is a longterm resident at [facility]. Since her prior COVID illness  she has had numerous complications and admissions. She was discharged to [facility] on 21 after a stay for encephalopathy, klebsiella UTI, workup of elevated LFTs, volume overload, hyperkalemia. She is pending outpatient EUS per GI on 21. Her losartan was stopped. She was resumed on oral lasix.   Per daughter Jose Gibson at her bedside, she was in her baseline health but today was noted to have increased tremors and extremity jerking. She did have some generalized weakness and is essentially wheelchair bound since COVID with extremity jerking/ tremors and some mouth Tremors. Upon visiting today, Dereje Mendoza noted she seemed worse and had her sent to the ED. She has potassium 6.5 and UTI. She has received insulin/ dextrose/ IVF bolus. EKG shows mildly peaked T waves. Review of Systems: A 14 point review of systems limited per her ability to fully interact and her tremors Past Medical History:  
Diagnosis Date  Acute cystitis without hematuria 1/30/2019  Acute pancreatitis 8/12/2019  CAD (coronary artery disease)  Diabetic ketoacidosis ([facility] [address] 3/8/2020  DM2 (diabetes mellitus, type 2) ([facility] [address]  Elevated liver function tests 8/8/2019  Encephalopathy 2/7/2021  Gout  Gram-negative bacteremia 8/9/2019  HLD (hyperlipidemia)  HTN (hypertension)  Peripheral neuropathy  Right lower quadrant abdominal pain 8/8/2019 Past Surgical History:  
Procedure Laterality Date  HX CHOLECYSTECTOMY jennifer in 1964  HX CORONARY ARTERY BYPASS GRAFT    
 x3  
 HX TUBAL LIGATION    
 IR INSERT NON TUNL CVC OVER 5 YRS  1/11/2021  ID CARDIAC SURG PROCEDURE UNLIST    
 stents x 3 2009 Family History : reviewed Family History Problem Relation Age of Onset  Hypertension Mother  Cancer Mother  Diabetes Mother  Heart Disease Mother Social History Tobacco Use  Smoking status: Never Smoker  Smokeless tobacco: Never Used Substance Use Topics  Alcohol use: Yes Comment: socially Allergies Allergen Reactions  Sulfa (Sulfonamide Antibiotics) Swelling Immunization History Administered Date(s) Administered  COVID-19, PFIZER, MRNA, LNP-S, PF, 30MCG/0.3ML DOSE 04/14/2021, 05/19/2021  Influenza Vaccine 08/18/2014  Influenza Vaccine PF 10/18/2010, 11/09/2011, 10/03/2012  TB Skin Test (PPD) Intradermal 01/30/2019, 08/09/2019, 03/09/2020, 12/28/2020  Zoster Recombinant 07/10/2019 PTA Medications: 
Current Outpatient Medications Medication Instructions  ascorbic acid, vitamin C, (Vitamin C) 500 mg tablet Oral, EVERY OTHER DAY  aspirin delayed-release 81 mg tablet Oral, DAILY  Blood-Glucose Meter (ONETOUCH ULTRAMINI) monitoring kit Use as directed  clopidogreL (PLAVIX) 75 mg, Oral  
 ferrous sulfate 325 mg (65 mg iron) tablet Oral, EVERY OTHER DAY  gabapentin (NEURONTIN) 100 mg, Oral, 3 TIMES DAILY  glucose blood VI test strips (ONETOUCH ULTRA TEST) strip Test 4 times daily as directed  insulin detemir U-100 (LEVEMIR U-100 INSULIN) 5 Units, SubCUTAneous, EVERY MORNING  insulin lispro (HUMALOG) 100 unit/mL injection Please give as per sliding scale  Insulin Needles, Disposable, 31 gauge x 5/16\" ndle SubCUTAneous, 4 TIMES DAILY BEFORE MEALS & NIGHTLY  Lancets (ONETOUCH ULTRASOFT LANCETS) misc Test 4 times daily as directed  metoprolol succinate (TOPROL-XL) 12.5 mg, Oral, DAILY  Omeprazole delayed release (PRILOSEC D/R) 20 mg, Oral, DAILY  rOPINIRole (REQUIP) 0.5 mg, Oral, BEDTIME PRN Objective:  
 
Patient Vitals for the past 24 hrs: 
 Temp Pulse Resp BP SpO2  
05/30/21 1823     100 % 05/30/21 1819 98.5 °F (36.9 °C) 65 22 (!) 189/85 100 % 05/30/21 1818 98.7 °F (37.1 °C) 66 16 (!) 189/85 100 % Oxygen Therapy O2 Sat (%): 100 % (05/30/21 1823) Pulse via Oximetry: 65 beats per minute (05/30/21 1819) O2 Device: Nasal cannula (05/30/21 1819) O2 Flow Rate (L/min): 2 l/min (05/30/21 1819) Body mass index is 25.29 kg/m². Physical Exam: 
 
General: No acute distress, speaking in short sentences, alert HEENT: Pupils equal and reactive to light and accommodation, oropharynx is clear Lungs: Clear to auscultation bilaterally anterior Cardiovascular: Regular rate and rhythm with normal S1 and S2 , no edema Abdomen: Soft, nontender, nondistended, normoactive bowel sounds Extremities: No cyanosis Neuro: has diffuse  Random extremity and head/ mouth movements, follows commands, moves extremities Psych: Normal mood and affect Data Reviewed: I have reviewed all labs, meds, and studies. Recent Results (from the past 24 hour(s)) CBC WITH AUTOMATED DIFF Collection Time: 05/30/21  6:31 PM  
Result Value Ref Range WBC 7.1 4.3 - 11.1 K/uL  
 RBC 2.74 (L) 4.05 - 5.2 M/uL HGB 8.9 (L) 11.7 - 15.4 g/dL HCT 28.6 (L) 35.8 - 46.3 % .4 (H) 79.6 - 97.8 FL  
 MCH 32.5 26.1 - 32.9 PG  
 MCHC 31.1 (L) 31.4 - 35.0 g/dL  
 RDW 15.2 (H) 11.9 - 14.6 % PLATELET 415 657 - 545 K/uL MPV 10.7 9.4 - 12.3 FL ABSOLUTE NRBC 0.00 0.0 - 0.2 K/uL  
 DF AUTOMATED NEUTROPHILS 61 43 - 78 % LYMPHOCYTES 27 13 - 44 % MONOCYTES 9 4.0 - 12.0 % EOSINOPHILS 3 0.5 - 7.8 % BASOPHILS 1 0.0 - 2.0 % IMMATURE GRANULOCYTES 0 0.0 - 5.0 %  
 ABS. NEUTROPHILS 4.4 1.7 - 8.2 K/UL  
 ABS. LYMPHOCYTES 1.9 0.5 - 4.6 K/UL  
 ABS. MONOCYTES 0.6 0.1 - 1.3 K/UL  
 ABS. EOSINOPHILS 0.2 0.0 - 0.8 K/UL  
 ABS. BASOPHILS 0.1 0.0 - 0.2 K/UL  
 ABS. IMM. GRANS. 0.0 0.0 - 0.5 K/UL METABOLIC PANEL, BASIC Collection Time: 05/30/21  6:31 PM  
Result Value Ref Range Sodium 144 136 - 145 mmol/L Potassium 6.5 (HH) 3.5 - 5.1 mmol/L Chloride 112 (H) 98 - 107 mmol/L  
 CO2 29 21 - 32 mmol/L Anion gap 3 (L) 7 - 16 mmol/L Glucose 93 65 - 100 mg/dL BUN 46 (H) 8 - 23 MG/DL Creatinine 1.87 (H) 0.6 - 1.0 MG/DL  
 GFR est AA 33 (L) >60 ml/min/1.73m2 GFR est non-AA 27 (L) >60 ml/min/1.73m2 Calcium 9.4 8.3 - 10.4 MG/DL  
HEPATIC FUNCTION PANEL Collection Time: 05/30/21  6:31 PM  
Result Value Ref Range Protein, total 7.5 6.3 - 8.2 g/dL Albumin 3.2 3.2 - 4.6 g/dL Globulin 4.3 (H) 2.3 - 3.5 g/dL A-G Ratio 0.7 (L) 1.2 - 3.5 Bilirubin, total 0.3 0.2 - 1.1 MG/DL Bilirubin, direct 0.1 <0.4 MG/DL Alk. phosphatase 164 (H) 50 - 136 U/L  
 AST (SGOT) 18 15 - 37 U/L  
 ALT (SGPT) 31 12 - 65 U/L  
MAGNESIUM Collection Time: 05/30/21  6:31 PM  
Result Value Ref Range Magnesium 2.2 1.8 - 2.4 mg/dL LACTIC ACID Collection Time: 05/30/21  7:06 PM  
Result Value Ref Range Lactic acid 0.7 0.4 - 2.0 MMOL/L  
URINE MICROSCOPIC Collection Time: 05/30/21  7:10 PM  
Result Value Ref Range WBC >100 0 /hpf  
 RBC 5-10 0 /hpf Epithelial cells 0 0 /hpf Bacteria TRACE 0 /hpf Casts 0 0 /lpf Crystals, urine 0 0 /LPF Mucus 0 0 /lpf EKG Results Procedure 720 Value Units Date/Time EKG, 12 LEAD, INITIAL [[id number]] Order Status: Sent All Micro Results Procedure Component Value Units Date/Time CULTURE, BLOOD [[id number]] Collected: 05/30/21 2025 Order Status: Completed Specimen: Blood Updated: 05/30/21 2033 CULTURE, BLOOD [[id number]] Order Status: Sent Specimen: Blood CULTURE, URINE [[id number]] Order Status: Sent Specimen: Cath Urine Other Studies: No results found. Medications: 
Medications Administered   
 cefTRIAXone (ROCEPHIN) 1 g in 0.9% sodium chloride (MBP/ADV) 50 mL MBP Admin Date 
05/30/2021 Action New Bag Dose 1 g Rate 
100 mL/hr Route IntraVENous Administered By 
Amara Tejada RN  
  
  
 dextrose (D50W) injection syrg 25 g Admin Date 
05/30/2021 Action New Bag Dose 
25 g Route IntraVENous Administered By 
Amara Tejada RN  
  
  
 insulin regular (NOVOLIN R, HUMULIN R) injection 8 Units Admin Date 
05/30/2021 Action Given Dose 
8 Units Route IntraVENous Administered By 
Amara Tejada RN  
  
  
 morphine injection 2 mg Admin Date 
05/30/2021 Action Given Dose 
2 mg Route IntraVENous Administered By 
Amara Tejada RN  
  
  
 sodium chloride 0.9 % bolus infusion 500 mL Admin Date 
05/30/2021 Action New Bag Dose 
500 mL Rate 500 mL/hr Route IntraVENous Administered By 
Amara Tejada RN  
  
  
 Problem List:  
 
Hospital Problems as of 5/30/2021 Date Reviewed: 4/19/2021 Codes Class Noted - Resolved POA Myoclonus (Chronic) ICD-10-CM: G25.3 ICD-9-CM: 333.2  5/30/2021 - Present Yes History of COVID-19 (Chronic) ICD-10-CM: Z86.16 
ICD-9-CM: V12.09  5/30/2021 - Present Yes Diastolic CHF, chronic (HCC) ICD-10-CM: I50.32 
ICD-9-CM: 428.32, 428.0  5/5/2021 - Present Yes Pulmonary HTN ([facility] [address] ICD-10-CM: I27.20 ICD-9-CM: 416.8  5/5/2021 - Present Yes Hyperkalemia ICD-10-CM: E87.5 ICD-9-CM: 276.7  5/5/2021 - Present Unknown UTI (urinary tract infection) ICD-10-CM: N39.0 ICD-9-CM: 599.0  5/5/2021 - Present Yes Chronic hypoxemic respiratory failure [facility]) ICD-10-CM: J96.11 
ICD-9-CM: 518.83, 799.02  5/5/2021 - Present Yes Abnormal LFTs ICD-10-CM: R94.5 ICD-9-CM: 790.6  5/5/2021 - Present Yes Anemia ICD-10-CM: D64.9 ICD-9-CM: 285.9  8/8/2019 - Present Yes Uncontrolled type 2 diabetes mellitus with hyperglycemia (HCC) (Chronic) ICD-10-CM: E11.65 ICD-9-CM: 250.02  6/15/2015 - Present Yes Essential hypertension (Chronic) ICD-10-CM: I10 
ICD-9-CM: 401.9  6/15/2015 - Present Yes Chronic kidney disease, stage III (moderate) (HCC) (Chronic) ICD-10-CM: N18.30 ICD-9-CM: 585.3  6/15/2015 - Present Yes Signed By: Anish Woodson MD  
[facility]  May 30, 2021  8:44 PM

## 2021-05-31 NOTE — PROGRESS NOTES
SPEECH LANGUAGE PATHOLOGY: DYSPHAGIA- Initial Assessment and Discharge NAME/AGE/GENDER: Jose Michael is a 78 y.o. female DATE: 5/31/2021 PRIMARY DIAGNOSIS: Hyperkalemia [E87.5] ICD-10: Treatment Diagnosis: R13.19 Other Dysphagia RECOMMENDATIONS  
DIET:  
 Regular Consistency  Thin Liquids MEDICATIONS: With liquid ASPIRATION PRECAUTIONS · Upright at 90 degrees during meal 
  
COMPENSATORY STRATEGIES/MODIFICATIONS · None RECOMMENDATIONS for CONTINUED SPEECH THERAPY:  
No further speech therapy indicated at this time. ASSESSMENT Patient seen for bedside swallowing evaluation. Pt given trials thin liquids, pureed, mixed consistencies and solids. Also observed taking whole medication administered by RN. Mastication was adequate. No signs/sx aspiration observed. Per chart review pt is currently on a regular diet with soft solids. Feel pt may have a regular diet. Pt reported being on a regular diet at baseline. Recommend a regular diet. No further ST indicated. EDUCATION: 
· Recommendations discussed with RN 
CONTINUATION OF SKILLED SERVICES/MEDICAL NECESSITY: 
 No additional speech services warranted. REHABILITATION POTENTIAL FOR STATED GOALS: NA 
 
PLAN   
FREQUENCY/DURATION: No further speech therapy indicated at this time as oropharyngeal swallow function is within normal limits. - Recommendations for next treatment session: No additional speech therapy indicated at this time. SUBJECTIVE Pt cooperative. Oxygen Device: nasal cannula Pain: Pain Scale 1: Numeric (0 - 10) Pain Intensity 1: 10 
Pain Location 1: Leg 
Pain Intervention(s) 1: Medication (see MAR) History of Present Injury/Illness: Ms. Bebeto Bahena  has a past medical history of Acute cystitis without hematuria (1/30/2019), Acute pancreatitis (8/12/2019), CAD (coronary artery disease), Diabetic ketoacidosis ([facility] [address] (3/8/2020), DM2 (diabetes mellitus, type 2) ([facility] [address] Elevated liver function tests (8/8/2019), Encephalopathy (2/7/2021), Gout, Gram-negative bacteremia (8/9/2019), HLD (hyperlipidemia), HTN (hypertension), Peripheral neuropathy, and Right lower quadrant abdominal pain (8/8/2019). . She also  has a past surgical history that includes hx tubal ligation; hx coronary artery bypass graft; pr cardiac surg procedure unlist; hx cholecystectomy; and ir insert non tunl cvc over 5 yrs (1/11/2021). PRECAUTIONS/ALLERGIES: Sulfa (sulfonamide antibiotics) Previous Dysphagia: pt has been evaluated by ST in the past [facility]; most recent recommendation in March of this year was for a regular diet Diet Prior to Evaluation: regular Cognitive-Linguistic Screening:  Alertness 
o Alert  Speech Production:  
o Fully intelligible  Expressive Language: 
o Fluent speech  Receptive Language: 
o Follows commands  Cognition:  
Abdon Viera WFL Prior Level of Function: long term resident of [facility] Recommendations: Given results of screening, patient appears to be functioning at baseline. No acute assessment of speech, language, or cognition warranted. OBJECTIVE  
 
 05/31/21 1023 Mental Status Neurologic State Alert Orientation Level Oriented X4 Oral Assessment Labial No impairment Dentition Natural 
(missing some dentition) Oral Hygiene adequate Lingual No impairment PO Trials Assessment Method(s) Observation;Palpation Patient Position upright in chair Vocal Quality Hoarse Consistency Presented Mixed consistency;Puree; Solid; Thin liquid How Presented Self-fed/presented;Cup/sip;Spoon;Straw;Successive swallows Bolus Acceptance No impairment Bolus Formation/Control No impairment Propulsion No impairment Oral Residue None Initiation of Swallow No impairment Laryngeal Elevation Functional  
Aspiration Signs/Symptoms None Tool Used: Dysphagia Outcome and Severity Scale (ELKE) Score Comments Normal Diet  [] 7 With no strategies or extra time needed Functional Swallow  [] 6 May have mild oral or pharyngeal delay Mild Dysphagia  [] 5 Which may require one diet consistency restricted Mild-Moderate Dysphagia  [] 4 With 1-2 diet consistencies restricted Moderate Dysphagia  [] 3 With 2 or more diet consistencies restricted Moderate-Severe Dysphagia  [] 2 With partial PO strategies (trials with ST only) Severe Dysphagia  [] 1 With inability to tolerate any PO safely Score:  Initial: 7 Most Recent:   
Interpretation of Tool: The Dysphagia Outcome and Severity Scale (ELKE) is a simple, easy-to-use, 7-point scale developed to systematically rate the functional severity of dysphagia based on objective assessment and make recommendations for diet level, independence level, and type of nutrition. Current Medications: No current facility-administered medications on file prior to encounter. Current Outpatient Medications on File Prior to Encounter Medication Sig Dispense Refill  metoprolol succinate (TOPROL-XL) 25 mg XL tablet Take 0.5 Tabs by mouth daily. 30 Tab 0  
 ferrous sulfate 325 mg (65 mg iron) tablet Take  by mouth every other day.  insulin detemir U-100 (Levemir U-100 Insulin) 100 unit/mL injection 5 Units by SubCUTAneous route Every morning.  Omeprazole delayed release (PRILOSEC D/R) 20 mg tablet Take 20 mg by mouth daily.  ascorbic acid, vitamin C, (Vitamin C) 500 mg tablet Take  by mouth every other day.  insulin lispro (HUMALOG) 100 unit/mL injection Please give as per sliding scale 1 Vial 0  
 aspirin delayed-release 81 mg tablet Take  by mouth daily.  Insulin Needles, Disposable, 31 gauge x 5/16\" ndle by SubCUTAneous route Before breakfast, lunch, dinner and at bedtime. 1 Package 11  
 clopidogrel (PLAVIX) 75 mg tab Take 75 mg by mouth.     
 Blood-Glucose Meter (ONETOUCH ULTRAMINI) monitoring kit Use as directed 1 Kit 0  
 glucose blood VI test strips (ONETOUCH ULTRA TEST) strip Test 4 times daily as directed 100 Strip 11  Lancets (ONETOUCH ULTRASOFT LANCETS) misc Test 4 times daily as directed 100 Each 11  
 gabapentin (NEURONTIN) 100 mg capsule Take 1 Cap by mouth three (3) times daily. 30 Cap 5  
 rOPINIRole (REQUIP) 0.5 mg tablet Take 1 Tab by mouth nightly as needed. 90 Tab 1 INTERDISCIPLINARY COLLABORATION: RN After treatment position/precautions: · Upright in chair · Call light within reach · RN notified Total Treatment Duration:  
Time In: 3497 Time Out: 1035 [address], [facility], [facility] CASIMIRO KEARNEY, CCC-SLP

## 2021-05-31 NOTE — PROGRESS NOTES
ACUTE PHYSICAL THERAPY GOALS: 
(Developed with and agreed upon by patient and/or caregiver. ) LTG: 
(1.)Ms. Jensen Huffman will move from supine to sit and sit to supine , scoot up and down and roll side to side in bed with MODIFIED INDEPENDENCE within 7 treatment day(s). (2.)Ms. Jensen Huffman will transfer from bed to chair and chair to bed with STAND BY ASSIST using the least restrictive device within 7 treatment day(s). (3.)Ms. Jensen Huffman will ambulate with CONTACT GUARD ASSIST for 50 feet with the least restrictive device within 7 treatment day(s). (4.)Ms. Jensen Huffman will participate in therapeutic activity/exercises x 25 minutes for increased strength within 7 treatment days. ________________________________________________________________________________________________ PHYSICAL THERAPY ASSESSMENT: Initial Assessment and AM PT Treatment Day # 1 Erlinda Rubio is a 78 y.o. female PRIMARY DIAGNOSIS: <principal problem not specified> Hyperkalemia [E87.5] Reason for Referral: ICD-10: Treatment Diagnosis: Generalized Muscle Weakness (M62.81) INPATIENT: Payor: [facility] MEDICARE / Plan: [address] / Product Type: Managed Care Medicare /  
 
ASSESSMENT:  
 
REHAB RECOMMENDATIONS:  
Recommendation to date pending progress: 
Setting:  Short-term Rehab Equipment:  To Be Determined PRIOR LEVEL OF FUNCTION: 
(Prior to Hospitalization) INITIAL/CURRENT LEVEL OF FUNCTION: 
(Most Recently Demonstrated) Bed Mobility: 
 Independent Sit to Stand:  Minimal Assistance Transfers:  Minimal Assistance Gait/Mobility: 
 Unable to perform Bed Mobility:  Minimal Assistance x 2 Sit to Stand:  Minimal Assistance x 2 Transfers:  Minimal Assistance x 2 Gait/Mobility:  Minimal Assistance x 2  
 
ASSESSMENT: 
Ms. Jensen Huffman presents to PT with LARA/LOVEThESIGN [facility] AROM and decreased strength in B LEs. Pt appeared confused but able to follow commands.   She required Bonita x 2 for bed mobility and transfers today. Pt also ambulated short distance with RW and Bonita x 2. She presents with balance deficits as well. OT assisted with ADLs while PT addressed transfers/ambulation. Ms. Dunia Bates could benefit from skilled PT as she is currently functioning below her baseline. SUBJECTIVE:  
Ms. Dunia Bates states, \"My daughter tells me that. \" SOCIAL HISTORY/LIVING ENVIRONMENT: Lives at [facility] and per chart w/c bound Home Environment: Skilled nursing facility One/Two Story Residence: One story Living Alone: No 
Support Systems: Family member(s), Child(marga), Skilled nursing facility OBJECTIVE:  
 
PAIN: VITAL SIGNS: LINES/DRAINS:  
Pre Treatment: Pain Screen Pain Scale 1: Numeric (0 - 10) Pain Intensity 1: 0 Post Treatment: 0   Purewick O2 Device: Nasal cannula GROSS EVALUATION: 
B LE Within Functional Limits Abnormal/ Functional Abnormal/ Non-Functional (see comments) Not Tested Comments: AROM [x] [] [] [] PROM [] [] [] [] Strength [] [x] [] [] Generally decreased Balance [] [] [x] [] Fair sitting and fair to poor standing Posture [] [] [] [] Sensation [] [] [] [] Coordination [] [x] [] [] Tone [] [] [] [] Edema [] [] [] [] Activity Tolerance [] [x] [] []   
 [] [] [] [] COGNITION/ 
PERCEPTION: Intact Impaired  
(see comments) Comments:  
Orientation [] [x] Vision [] [] Hearing [] [] Command Following [x] [] Safety Awareness [] [x] Decreased insight in to deficits  
 [] [] MOBILITY: I Mod I S SBA CGA Min Mod Max Total  NT x2 Comments:  
Bed Mobility Rolling [] [] [] [] [] [x] [] [] [] [] [x] Supine to Sit [] [] [] [] [] [x] [] [] [] [] [x] Scooting [] [] [] [] [] [x] [] [] [] [] [x] Sit to Supine [] [] [] [] [] [] [] [] [] [] []   
Transfers Sit to Stand [] [] [] [] [] [x] [] [] [] [] [x] Bed to Chair [] [] [] [] [] [x] [] [] [] [] [x] Stand to Sit [] [] [] [] [] [x] [] [] [] [] [x] I=Independent, Mod I=Modified Independent, S=Supervision, SBA=Standby Assistance, CGA=Contact Guard Assistance,  
Min=Minimal Assistance, Mod=Moderate Assistance, Max=Maximal Assistance, Total=Total Assistance, NT=Not Tested GAIT: I Mod I S SBA CGA Min Mod Max Total  NT x2 Comments:  
Level of Assistance [] [] [] [] [] [x] [] [] [] [] [x] Distance 5 ft DME [employer] Gait Quality Shuffled steps Weightbearing Status N/A I=Independent, Mod I=Modified Independent, S=Supervision, SBA=Standby Assistance, CGA=Contact Guard Assistance,  
Min=Minimal Assistance, Mod=Moderate Assistance, Max=Maximal Assistance, Total=Total Assistance, NT=Not Tested [facility] Mobility Inpatient Short Form How much difficulty does the patient currently have. .. Unable A Lot A Little None 1. Turning over in bed (including adjusting bedclothes, sheets and blankets)? [] 1   [] 2   [x] 3   [] 4  
2. Sitting down on and standing up from a chair with arms ( e.g., wheelchair, bedside commode, etc.)   [] 1   [] 2   [x] 3   [] 4  
3. Moving from lying on back to sitting on the side of the bed? [] 1   [] 2   [x] 3   [] 4 How much help from another person does the patient currently need. .. Total A Lot A Little None 4. Moving to and from a bed to a chair (including a wheelchair)? [] 1   [] 2   [x] 3   [] 4  
5. Need to walk in hospital room? [] 1   [] 2   [x] 3   [] 4  
6. Climbing 3-5 steps with a railing? [] 1   [x] 2   [] 3   [] 4  
© 2007, Trustees of [facility], under license to [employer]. All rights reserved Score:  Initial: 17 Most Recent: X (Date: -- ) Interpretation of Tool:  Represents activities that are increasingly more difficult (i.e. Bed mobility, Transfers, Gait).  
 
PLAN:  
FREQUENCY/DURATION: PT Plan of Care: 3 times/week for duration of hospital stay or until stated goals are met, whichever comes first. 
 
PROBLEM LIST:  
(Skilled intervention is medically necessary to address:) 1. Decreased ADL/Functional Activities 2. Decreased Activity Tolerance 3. Decreased Balance 4. Decreased Cognition 5. Decreased Coordination 6. Decreased Gait Ability 7. Decreased Strength 8. Decreased Transfer Abilities INTERVENTIONS PLANNED:  
(Benefits and precautions of physical therapy have been discussed with the patient.) 1. Therapeutic Activity 2. Therapeutic Exercise/HEP 3. Neuromuscular Re-education 4. Gait Training 5. Manual Therapy 6. Education TREATMENT:  
 
EVALUATION: Low Complexity : (Untimed Charge) TREATMENT:  
($$ Therapeutic Activity: 23-37 mins    ) Therapeutic Activity (23 Minutes): Therapeutic activity included Supine to Sit, Scooting, Transfer Training, Ambulation on level ground, Sitting balance  and Standing balance to improve functional Mobility, Strength and Activity tolerance. TREATMENT GRID: 
N/A 
 
AFTER TREATMENT POSITION/PRECAUTIONS: 
Alarm Activated, Chair and Needs within reach INTERDISCIPLINARY COLLABORATION: 
RN/PCT, PT/PTA and OT/PERALTA TOTAL TREATMENT DURATION: 
PT Patient Time In/Time Out Time In: 9945 Time Out: 0648 Zacarias Contreras PT, DPT

## 2021-05-31 NOTE — ROUTINE PROCESS
Bedside and Verbal shift change report given to self (oncoming nurse) by Michaela Ghotra RN  (offgoing nurse). Report included the following information SBAR, Kardex, Intake/Output, MAR and Recent Results.

## 2021-05-31 NOTE — PROGRESS NOTES
Speech Pathology ST attempted. However, pt with PT and OT. Will re-attempt later this date.  
 
[address], [facility] PER[facility], [facility]

## 2021-05-31 NOTE — PROGRESS NOTES
ACUTE OT GOALS: 
(Developed with and agreed upon by patient and/or caregiver.) 1. Patient will complete lower body bathing and dressing with min A and adaptive equipment as needed. 2. Patient will complete toileting with min A.  
3. Patient will tolerate 25 minutes of OT treatment with 1-2 rest breaks to increase activity tolerance for ADLs. 4. Patient will complete functional transfers with min A and adaptive equipment as needed. 5. Patient will complete functional activity while seated edge of bed with SBA and adaptive equipment as needed. 6. Patient will tolerate 15 minutes unsupported sitting balance with CGA in preparation for ADL performance. 7. Patient will tolerate 30 minutes BUE therapeutic activities to increase use of BUE during ADL performance. Timeframe: 7 visits OCCUPATIONAL THERAPY ASSESSMENT: Initial Assessment and Daily Note OT Treatment Day # 1 Darlin Chun is a 78 y.o. female PRIMARY DIAGNOSIS: <principal problem not specified> Hyperkalemia [E87.5] Reason for Referral: ICD-10: Treatment Diagnosis: Generalized Muscle Weakness (M62.81) INPATIENT: Payor: [facility] MEDICARE / Plan: [address] / Product Type: Managed Care Medicare /  
ASSESSMENT:  
 
REHAB RECOMMENDATIONS:  
Recommendation to date pending progress: 
Setting:  Short-term Rehab Equipment:  To Be Determined PRIOR LEVEL OF FUNCTION: 
(Prior to Hospitalization)  INITIAL/CURRENT LEVEL OF FUNCTION: 
(Based on today's evaluation) Bathing:  Unknown Dressing:  Unknown Feeding/Grooming:  Unknown Toileting:  Unknown Functional Mobility: 
 wheelchair bound since COVID per chart Bathing:  Moderate Assistance Dressing:  Moderate Assistance Feeding/Grooming:  Minimal Assistance Toileting:  Maximal Assistance Functional Mobility:  Minimal Assistance x 2 x RW  
 
ASSESSMENT: 
Ms. Stephen Contreras presents with deficits in overall strength, activity tolerance, ADL performance and functional mobility. Pt confused today; unable to obtain PLOF; per chart pt lives at [facility] where she is a long-term resident; has been w/c bound since [facility]. BUE AROM and strength (3/5) are generally decreased but WFL. Min A x 2 for functional bed mobility/transfers; fair (+) static EOB sitting however poor dynamic sitting balance. Min A for self-grooming tasks including washing face and brushing teeth; max A to jossie socks. Min A x 2 x RW for steps to chair. At this time, Zoey Benson is functioning below baseline for ADLs and functional mobility. Pt would benefit from skilled OT services to address OT goals and and plan of care. .  
 
SUBJECTIVE:  
Ms. Isaac Fagan states, \"I'm eating my cereal.\" SOCIAL HISTORY/LIVING ENVIRONMENT:  
Home Environment: Skilled nursing facility One/Two Story Residence: One story Living Alone: No 
Support Systems: Family member(s), Child(marga), Skilled nursing facility OBJECTIVE:  
 
PAIN: VITAL SIGNS: LINES/DRAINS:  
Pre Treatment: Pain Screen Pain Scale 1: Numeric (0 - 10) Pain Intensity 1: 0 Post Treatment: 0   none O2 Device: Nasal cannula GROSS EVALUATION: 
BUEs Within Functional Limits Abnormal/ Functional Abnormal/ Non-Functional (see comments) Not Tested Comments: AROM [] [x] [] [] PROM [] [x] [] [] Strength [] [x] [] [] 3-/5 Balance [] [x] [] [] Intact static sitting balance; poor dynamic sitting balance; fair standing balance with use of RW Posture [] [x] [] [] Sensation [x] [] [] [] Coordination [] [x] [] [] Tone [] [x] [] [] Edema [] [x] [] [] Activity Tolerance [] [x] [] [] 2L 02  
 [] [] [] [] COGNITION/ 
PERCEPTION: Intact Impaired  
(see comments) Comments:  
Orientation [] [x] confused Vision [x] [] Hearing [] [x] [facility] Judgment/ Insight [] [x] Attention [] [x] Memory [] [x] Command Following [x] [] Emotional Regulation [x] []   
 [] [] ACTIVITIES OF DAILY LIVING: I Mod I S SBA CGA Min Mod Max Total NT Comments BASIC ADLs:             
Bathing/ Showering [] [] [] [] [] [] [] [] [] [x] Toileting [] [] [] [] [] [] [] [] [] [x] Dressing [] [] [] [] [] [x] [] [] [] [] [place] gown; [facility] gown Feeding [] [] [] [] [] [] [] [] [] [x] Grooming [] [] [] [x] [] [] [] [] [] [] Washing face and brushing teeth while seated edge of bed Personal Device Care [] [] [] [] [] [] [] [] [] [x] Functional Mobility [] [] [] [] [] [x] [] [] [] [] X 2 x RW I=Independent, Mod I=Modified Independent, S=Supervision, SBA=Standby Assistance, CGA=Contact Guard Assistance,  
Min=Minimal Assistance, Mod=Moderate Assistance, Max=Maximal Assistance, Total=Total Assistance, NT=Not Tested MOBILITY: I Mod I S SBA CGA Min Mod Max Total  NT x2 Comments:  
Supine to sit [] [] [] [] [] [x] [] [] [] [] [x] Sit to supine [] [] [] [] [] [] [] [] [] [x] [] Sit to stand [] [] [] [] [] [x] [] [] [] [] [x] Bed to chair [] [] [] [] [] [x] [] [] [] [] [x] X RW  
I=Independent, Mod I=Modified Independent, S=Supervision, SBA=Standby Assistance, CGA=Contact Guard Assistance,  
Min=Minimal Assistance, Mod=Moderate Assistance, Max=Maximal Assistance, Total=Total Assistance, NT=Not Tested [facility] Daily Activity Inpatient Short Form How much help from another person does the patient currently need. .. Total A Lot A Little None 1. Putting on and taking off regular lower body clothing? [] 1   [x] 2   [] 3   [] 4  
2. Bathing (including washing, rinsing, drying)? [] 1   [x] 2   [] 3   [] 4  
3. Toileting, which includes using toilet, bedpan or urinal?   [] 1   [x] 2   [] 3   [] 4  
4. Putting on and taking off regular upper body clothing? [] 1   [] 2   [x] 3   [] 4  
5. Taking care of personal grooming such as brushing teeth? [] 1   [] 2   [x] 3   [] 4  
6. Eating meals?    [] 1   [] 2   [x] 3   [] 4  
© 2007, Trustees of [address] [place] [address] [address] [id number], under license to [employer]. All rights reserved Score:  Initial: 15 Most Recent: X (Date: -- ) Interpretation of Tool:  Represents activities that are increasingly more difficult (i.e. Bed mobility, Transfers, Gait). PLAN:  
FREQUENCY/DURATION: OT Plan of Care: 3 times/week for duration of hospital stay or until stated goals are met, whichever comes first. 
 
PROBLEM LIST:  
(Skilled intervention is medically necessary to address:) 1. Decreased ADL/Functional Activities 2. Decreased Activity Tolerance 3. Decreased Balance 4. Decreased Cognition 5. Decreased Coordination 6. Decreased Gait Ability 7. Decreased Strength 8. Decreased Transfer Abilities INTERVENTIONS PLANNED:  
(Benefits and precautions of occupational therapy have been discussed with the patient.) 1. Self Care Training 2. Therapeutic Activity 3. Therapeutic Exercise/HEP 4. Neuromuscular Re-education 5. Education TREATMENT:  
 
EVALUATION: Low Complexity : (Untimed Charge) TREATMENT:  
($$ Self Care/Home Management: 8-22 mins$$ Neuromuscular Re-Education: 8-22 mins   ) Self Care (13 Minutes): Self care including Upper Body Dressing and Grooming to increase independence and decrease level of assistance required. Neuromuscular Re-education (10 Minutes): Neuromuscular Re-education included Balance Training, Coordination training, Postural training, Sitting balance training and Standing balance training to improve Balance, Coordination and Postural Control. TREATMENT GRID: 
N/A 
 
AFTER TREATMENT POSITION/PRECAUTIONS: 
Alarm Activated, Chair, Needs within reach and RN notified INTERDISCIPLINARY COLLABORATION: 
RN/PCT, PT/PTA and OT/PERALTA TOTAL TREATMENT DURATION: 
OT Patient Time In/Time Out Time In: 3086 Time Out: 5678 Fariba Bruner OT

## 2021-05-31 NOTE — ROUTINE PROCESS
Bedside and Verbal shift change report given to Emiliano Espinosa RN (oncoming nurse) by self (offgoing nurse). Report included the following information SBAR, Kardex, MAR, Recent Results and Cardiac Rhythm sinus kevyn.

## 2021-06-01 NOTE — CONSULTS
[facility] Consult Note Primary GI Physician: Dr. Jony Masters Referring Provider:  Dr. Merari Lantigua Consult Date:  6/1/2021 Admit Date:  5/30/2021 Chief Complaint:  Needs EUS Subjective:  
 
History of Present Illness:  Patient is a 78 y.o. female with PMH including but not limited to HTN, DM2, CKD3, CAD, CHF with preserved EF, COPD with chronic hypoxia on 2L oxygen, pulmonary HTN, COVID 19 December 2020 s/p trach (removed) who is seen in consultation at the request of Dr. Merari Lantigua for EUS. Patient was seen in consultation 5/7 by GI for elevated LFTs with US evidence of dilated CBD and PD. MRCP showed double duct sign and she underwent ERCP showing 15mm CBD without stricture. She was scheduled for EUS as an outpatient with Dr. Ludivina Godwin 6/3/21 at [facility]. However she was re-admitted 5/30/21 for increased tremors and extremity jerking and diagnosed with UTI and potassium 6.5. EKG shows mildly peaked T waves. Losartan dose was held. Potassium is down to 5.5 with IV insulin/dextrose, IV bicarb, and Lokelma x1 today. She is on 4L oxygen via [place]. Anemia is stable with hgb 8.4. She denies N/V, abdominal pain. ERCP 5/10/21: Findings:  
Esophagus- Normal. 
Stomach- Normal. 
Duodenum- Normal. 
  
Major duodenal papilla- Normal. 
Common bile duct- Dilated to 15 mm. No obvious stricture. Intrahepatic ducts- Normal. 
Pancreatic duct- Not entered. Gallbladder- Not visualized. 
  
Therapies: Sphincterotomy 
  
Specimens: None 
  
Estimated Blood Loss: 0 cc Complications:   None; patient tolerated the procedure well. Attending Attestation:  I performed the procedure.  
  
Impression:   
Very dilated bile duct to 15 mm. No obvious biliary stricture. Sphincterotomy performed. 
  
Recommendations: Outpt EUS to rule out pancreas cancer given double duct sign on MRCP. PMH: 
Past Medical History:  
Diagnosis Date  Acute cystitis without hematuria 1/30/2019  Acute pancreatitis 8/12/2019  CAD (coronary artery disease)  Diabetic ketoacidosis ([facility] [address] 3/8/2020  DM2 (diabetes mellitus, type 2) ([facility] [address]  Elevated liver function tests 8/8/2019  Encephalopathy 2/7/2021  Gout  Gram-negative bacteremia 8/9/2019  HLD (hyperlipidemia)  HTN (hypertension)  Peripheral neuropathy  Right lower quadrant abdominal pain 8/8/2019 PSH: 
Past Surgical History:  
Procedure Laterality Date  HX CHOLECYSTECTOMY jennifer in 1964  HX CORONARY ARTERY BYPASS GRAFT    
 x3  
 HX TUBAL LIGATION    
 IR INSERT NON TUNL CVC OVER 5 YRS  1/11/2021  SD CARDIAC SURG PROCEDURE UNLIST    
 stents x 3 2009 Allergies: Allergies Allergen Reactions  Sulfa (Sulfonamide Antibiotics) Swelling Home Medications: 
Prior to Admission medications Medication Sig Start Date End Date Taking? Authorizing Provider  
metoprolol succinate (TOPROL-XL) 25 mg XL tablet Take 0.5 Tabs by mouth daily. 5/13/21   Holden, Thu, DO  
ferrous sulfate 325 mg (65 mg iron) tablet Take  by mouth every other day. Provider, Historical  
insulin detemir U-100 (Levemir U-100 Insulin) 100 unit/mL injection 5 Units by SubCUTAneous route Every morning. Provider, Historical  
Omeprazole delayed release (PRILOSEC D/R) 20 mg tablet Take 20 mg by mouth daily. Provider, Historical  
ascorbic acid, vitamin C, (Vitamin C) 500 mg tablet Take  by mouth every other day. Provider, Historical  
insulin lispro (HUMALOG) 100 unit/mL injection Please give as per sliding scale 4/20/21   Randall Vázquez MD  
aspirin delayed-release 81 mg tablet Take  by mouth daily. Provider, Historical  
Insulin Needles, Disposable, 31 gauge x 5/16\" ndle by SubCUTAneous route Before breakfast, lunch, dinner and at bedtime. 9/20/19   Merari CHARLES NP  
clopidogrel (PLAVIX) 75 mg tab Take 75 mg by mouth.     Provider, Historical  
Blood-Glucose Meter (ONETOUCH ULTRAMINI) monitoring kit Use as directed 4/14/15 Gayatri Gilman MD  
glucose blood VI test strips [facility] ULTRA TEST) strip Test 4 times daily as directed 4/14/15   Gayatri Gilman MD  
Lancets [facility] ULTRASOFT LANCETS) misc Test 4 times daily as directed 4/14/15   Gayatri Gilman MD  
gabapentin (NEURONTIN) 100 mg capsule Take 1 Cap by mouth three (3) times daily. 3/9/15   Gayatri Gilman MD  
rOPINIRole (REQUIP) 0.5 mg tablet Take 1 Tab by mouth nightly as needed. 4/21/14   Gayatri Gilman MD  
 
 
Hospital Medications: 
Current Facility-Administered Medications Medication Dose Route Frequency  vancomycin (VANCOCIN) 1,000 mg in 0.9% sodium chloride 250 mL (VIAL-MATE)  1,000 mg IntraVENous Q24H  
 insulin glargine (LANTUS) injection 3 Units  3 Units SubCUTAneous DAILY  labetaloL (NORMODYNE;TRANDATE) injection 10 mg  10 mg IntraVENous Q4H PRN  
 alcohol 62% (NOZIN) nasal  1 Ampule  1 Ampule Topical Q12H  
 sodium chloride (NS) flush 5-40 mL  5-40 mL IntraVENous Q8H  
 sodium chloride (NS) flush 5-40 mL  5-40 mL IntraVENous PRN  
 ascorbic acid (vitamin C) (VITAMIN C) tablet 500 mg  500 mg Oral EVERY OTHER DAY  aspirin delayed-release tablet 81 mg  81 mg Oral DAILY  clopidogreL (PLAVIX) tablet 75 mg  75 mg Oral DAILY  ferrous sulfate tablet 325 mg  1 Tablet Oral EVERY OTHER DAY  [Held by provider] metoprolol succinate (TOPROL-XL) XL tablet 12.5 mg  12.5 mg Oral DAILY  pantoprazole (PROTONIX) tablet 40 mg  40 mg Oral ACB  sodium chloride (NS) flush 5-40 mL  5-40 mL IntraVENous Q8H  
 sodium chloride (NS) flush 5-40 mL  5-40 mL IntraVENous PRN  
 acetaminophen (TYLENOL) tablet 650 mg  650 mg Oral Q6H PRN Or  
 acetaminophen (TYLENOL) suppository 650 mg  650 mg Rectal Q6H PRN  polyethylene glycol (MIRALAX) packet 17 g  17 g Oral DAILY PRN  
 enoxaparin (LOVENOX) injection 30 mg  30 mg SubCUTAneous DAILY  ondansetron (ZOFRAN) injection 4 mg  4 mg IntraVENous Q6H PRN  
 insulin lispro (HUMALOG) injection   SubCUTAneous AC&HS  hydrALAZINE (APRESOLINE) 20 mg/mL injection 10 mg  10 mg IntraVENous Q6H PRN Social History: 
Social History Tobacco Use  Smoking status: Never Smoker  Smokeless tobacco: Never Used Substance Use Topics  Alcohol use: Yes Comment: socially Pt denies any history of drug use, blood transfusions, or tattoos. Family History: 
Family History Problem Relation Age of Onset  Hypertension Mother  Cancer Mother  Diabetes Mother  Heart Disease Mother Review of Systems: A detailed 10 system ROS is obtained, with pertinent positives as listed above. All others are negative. Diet:   
 
Objective:  
 
Physical Exam: 
Vitals: 
Visit Vitals BP (!) 162/72 (BP 1 Location: Right upper arm, BP Patient Position: Sitting) Pulse 62 Temp 97.2 °F (36.2 °C) Resp 17 Ht 5' 5\" (1.651 m) Wt 58.1 kg (128 lb) SpO2 100% BMI 21.30 kg/m² Gen:  Pt is alert, cooperative, no acute distress Skin:  Extremities and face reveal no rashes. HEENT: Sclerae anicteric. Extra-occular muscles are intact. No oral ulcers. No abnormal pigmentation of the lips. The neck is supple. Cardiovascular: Regular rate and rhythm. No murmurs, gallops, or rubs. Respiratory:  Comfortable breathing with no accessory muscle use. Clear breath sounds anteriorly with no wheezes, rales, or rhonchi. GI:  Abdomen nondistended, soft, and nontender. Normal active bowel sounds. No enlargement of the liver or spleen. No masses palpable. Rectal:  Deferred Musculoskeletal:  No pitting edema of the lower legs. Neurological:  Gross memory appears intact. Patient is alert and oriented. Psychiatric:  Mood appears appropriate with judgement intact. Lymphatic:  No cervical or supraclavicular adenopathy. Laboratory:   
Recent Labs  
  06/01/21 
0416 05/31/21 [id number] 05/31/21 
0506 05/31/21 
0112 05/30/21 
1831 WBC 7.1  --  5.6  --  7.1 HGB 8.4*  --  8.4*  --  8.9* HCT 26.8*  --  27.3* --  28.6*  
  --  164  --  189 .5*  --  105.0*  --  104.4*  142 144  --  144  
K 5.4* 5.7* 5.6* 5.9* 6.5*  
* 110* 113*  --  112* CO2 31 28 30  --  29 BUN 41* 43* 43*  --  46* CREA 1.59* 1.83* 1.71*  --  1.87* CA 8.8 9.1 9.0  --  9.4 MG  --   --   --   --  2.2 * 236* 97  --  93  
AP  --   --  148*  --  164* AST  --   --  14*  --  18 ALT  --   --  28  --  31  
TBILI  --   --  0.3  --  0.3 CBIL  --   --   --   --  0.1 ALB  --   --  3.0*  --  3.2 TP  --   --  6.8  --  7.5  
  
US 5/7/21: FINDINGS:  
Hepatic echotexture is felt to be at the upper limits of normal. There 
is no intrahepatic biliary ductal dilatation. There is no focal hepatic mass. 
  
The gallbladder is absent. The common bile duct is dilated measuring 14 mm. 
  
The pancreatic duct is also dilated at 4 to 5 mm. Only portions of the 
pancreatic head and proximal body is able to the visualized with the remaining 
portions obscured by overlying bowel gas. 
  
The right kidney measures 9.0 cm and is echogenic. There is no hydronephrosis. 
  
The visualized portions of the abdominal aorta and inferior vena cava are 
unremarkable. There is normal hepatopetal flow within the main portal vein. No 
free fluid is seen within the right upper quadrant. 
  
IMPRESSION 1. Dilated common bile duct as well as dilatation of the visualized pancreatic 
duct. While these findings could be in part due to biliary ectasia following 
cholecystectomy, the possibility of an obstructing mass or stricture cannot be 
excluded. An MRCP or ERCP may be beneficial if there is further clinical 
concern. 2. Echogenic and atrophic right kidney suggesting medical renal disease. MRCP 5/8/21: FINDINGS: 
- LIVER: Normal in size and appearance. - GALLBLADDER/BILE DUCTS: Postcholecystectomy. There is moderate bile duct 
dilatation. Common duct measures 15 mm. No discrete stricture or stone is 
seen.  
- PANCREAS: Mild pancreatic duct dilatation, 5 mm. No discrete pancreas mass is 
no evidence of acute pancreas inflammation. 
- SPLEEN: Normal. 
- ADRENALS: Normal. 
- KIDNEYS/URETERS: No hydronephrosis or significant mass. 
- LYMPH NODES: No significant retroperitoneal or mesenteric adenopathy. 
- BONES: No fracture or significant bone lesion. 
- OTHER: No significant ascites. 
  
IMPRESSION Moderate bile duct dilatation. No obstructing stone or mass is 
seen. Assessment:  
 
Active Problems: 
  Uncontrolled type 2 diabetes mellitus with hyperglycemia ([address] (6/15/2015) Essential hypertension (6/15/2015) Chronic kidney disease, stage III (moderate) (HCC) (6/15/2015) Anemia (8/8/2019) Diastolic CHF, chronic ([address] (5/5/2021) Pulmonary HTN ([address] (5/5/2021) Hyperkalemia (5/5/2021) UTI (urinary tract infection) (5/5/2021) Chronic hypoxemic respiratory failure ([address] (5/5/2021) Abnormal LFTs (5/5/2021) Myoclonus (5/30/2021) History of COVID-19 (5/30/2021) 78 y.o. female with PMH including but not limited to HTN, DM2, CKD3, CAD, CHF with preserved EF, COPD with chronic hypoxia on 2L oxygen, pulmonary HTN, COVID 19 December 2020 s/p trach (removed) who is seen in consultation at the request of Dr. Corine Gilman for EUS. Patient was seen in consultation 5/7 by GI for elevated LFTs with US evidence of dilated CBD and PD. MRCP showed double duct sign and she underwent ERCP showing 15mm CBD without stricture. She was scheduled for EUS as an outpatient with Dr. Abby Beasley 6/3/21 at [facility]. However she was re-admitted 5/30/21 for increased tremors and extremity jerking and diagnosed with UTI and potassium 6.5. EKG shows mildly peaked T waves. Losartan dose was held. Potassium is down to 5.5 with IV insulin/dextrose, IV bicarb, and Lokelma x1 today. She is on 4L oxygen via [place]. Anemia is stable with hgb 8.4. LFTs normal except for elevated Alk phos 148, Plan: - I am waiting to see if  Kurtis Soriano can add her on for EUS tomorrow while he is here at [facility]. If this is not an option and she is able to be discharged, she can proceed with EUS as scheduled 6/3 at [facility]. If neither is an option, we will need to reschedule this procedure. Tracy Franco Addendum at 4:15pm: EUS scheduled with Dr. Kurtis Soriano at 11am tomorrow. Lisa Carlisle Patient is seen and examined in collaboration with Dr. Bria So. Assessment and plan as per Dr. Bria So.

## 2021-06-01 NOTE — PROGRESS NOTES
Pt presented to [facility] ED from [facility] (is a LTC resident) due to increased tremors and weakness. CM met with pt and her dtr, who was at her bedside for assessment and dcp. CM reached out to CL at [facility] for a bed hold date but at the time of this note, have not recvd a return text. Pts dtr stated that prior to the pt getting COVID she was not on home oxygen. Now is on 2-4L. She reported that she did not know who supplies her oxygen at the facility. Pt is WC bound ever since COVID as well per pts dtr. PCP and insurance verified. Will confirm with CL if pt needs PCP and or COVID swab prior to return. 1816-Per CL, pt is [place] on a bed hold due to Medicaid pending. \" Pt will need to get a pre-cert started with her insurance prior to return. Care Management Interventions PCP Verified by CM: Yes Mode of Transport at Discharge: BLS Transition of Care Consult (CM Consult): Discharge Planning, SNF (LTC Resident at [facility]) Discharge Durable Medical Equipment: No 
Physical Therapy Consult: Yes Occupational Therapy Consult: Yes Speech Therapy Consult: Yes Current Support Network: [address] [place] [address] Ave, Family Lives [place] (LTC resident at [facility]) Confirm Follow Up Transport: Family The Plan for Transition of Care is Related to the Following Treatment Goals : Return to her baseline Discharge Location Discharge Placement: Skilled nursing facility

## 2021-06-01 NOTE — PROGRESS NOTES
Physician Progress Note Stevie Nguyen 
CSN #:                  [id number] :                       1940 ADMIT DATE:       2021 6:11 PM 
DISCH DATE: 
RESPONDING 
PROVIDER #:        Princess Carvajal MD 
 
 
 
 
QUERY TEXT: 
 
Pt admitted with tremors and generalized weakness, noted with Hyperkalemia with potassium of 6.5. Pt noted with a UTI ( history of chronic indwelling urinary catheter, previous procedure/urinary catheter or other urinary drainage device). If possible, please document in the progress notes and discharge summary if you are evaluating and/or treating any of the following: The medical record reflects the following: 
Risk Factors: UTI, CKD,Diabetes, longterm skilled nursing home resident, wheelchair bound Clinical Indicators: -Urinalysis (collect via catheter spec) - greater than 100 white cells with trace bacteria, urine culture  notes >100,000 COLONIES/mL GRAM POSITIVE COCCI SUBCULTURE (Cath urine). Per H&P, ?UTI, Rocephin? follow up culture. Treatment:IV N/S 500ml bolus, IV N/s at 75 cc/hr., Rocephin IV, Lab monitoring, Bld and urine cults, Intake and Output, freq Vs. 
Options provided: 
-- UTI due to chronic indwelling urinary catheter, Present on Admission 
-- UTI is not due to the indwelling urinary catheter 
-- Other - I will add my own diagnosis -- Disagree - Not applicable / Not valid -- Disagree - Clinically unable to determine / Unknown 
-- Refer to Clinical Documentation Reviewer PROVIDER RESPONSE TEXT: 
 
UTI- NO CHRONIC INDWELLING LEHMAN CATHTER Query created by: evelin mahoney on 2021 8:14 AM 
 
 
Electronically signed by:  Princess Carvajal MD 2021 2:30 PM

## 2021-06-01 NOTE — PROGRESS NOTES
Pharmacokinetic Consult to Pharmacist 
 
Jason Juan Francisco is a 78 y.o. female being treated for GPC in urine with vancomycin. Height: 5' 5\" (165.1 cm)  Weight: 58.1 kg (128 lb) Lab Results Component Value Date/Time BUN 41 (H) 06/01/2021 04:16 AM  
 Creatinine 1.59 (H) 06/01/2021 04:16 AM  
 WBC 7.1 06/01/2021 04:16 AM  
 Procalcitonin <0.05 05/05/2021 11:34 AM  
 Lactic acid 0.7 05/30/2021 07:06 PM  
 Lactic Acid (POC) 1.55 09/16/2019 11:40 AM  
  
Estimated Creatinine Clearance: 24.1 mL/min (A) (based on SCr of 1.59 mg/dL (H)). Day 1 of vancomycin. Goal trough is 10-20. Vancomycin dose initiated at 1000 mg Q24H. Will continue to follow patient and order levels when clinically indicated. Thank you, 
Heriberto Foster, PharmD, BCPS Clinical Pharmacist 
[phone]

## 2021-06-01 NOTE — PROGRESS NOTES
[facility] Hospitalist Progress Note Name:  Vee Kern  Age:81 y.o. Sex:female :  1940 MRN:  [id number] Admit Date:  2021 Reason for Admission: Hyperkalemia [E87.5] Hospital Course/Interval history: This is a 78 y.o. female with medical history of HTN, HFpEF, DM2, CKD3 with prior short term dialysis, COPD on 2 L NC with chronic hypoxic respiratory failure, COVID 2020 s/p trache / decanulated, pulmonary HTN, who is evaluated with tremors. She currently is a longterm resident at [facility]. Since her prior COVID illness  she has had numerous complications and admissions. She was discharged to [facility] on 21 after a stay for encephalopathy, Klebsiella UTI, workup of elevated LFTs, volume overload, hyperkalemia. She is pending outpatient EUS per GI on 21. Her losartan was stopped. She was resumed on oral lasix. Per daughter, Nati Alexander at her bedside, she was in her baseline health but was noted to have increased tremors and extremity jerking. She did have some generalized weakness and is essentially wheelchair bound since COVID with extremity jerking/ tremors and some mouth Tremors. Upon visiting today, Nati Alexander noted she seemed worse and had her sent to the ED. She has potassium 6.5 and UTI. She has received insulin/ dextrose/ IVF bolus. EKG shows mildly peaked T waves. Losartan dose was held. Swetha Mitten was given. Patient was evaluated by neurology. Recommendations to hold gabapentin. Currently no myoclonus. Subjective (21): Patient is alert and oriented but unable to provide the reason why she is in the hospital.  denies any chest pain, shortness of breath, palpitation, nausea or vomiting. Rest review of system negative except mentioned above. Assessment & Plan This is a 57-year-old female with:  
 
Hyperkalemia Losartan on hold. Potassium was 6.5 on admission. She received insulin dextrose IV fluids.   Recheck potassium this morning is 5.6. Ordered Lokelma x1. Plan to repeat BMP this afternoon. June 1: Creatinine as well as potassium improving after holding Lasix. Will continue to monitor potassium. No signs or symptoms of significant volume overload at present. We will continue to monitor. Repeat potassium is 5.5. Unclear etiology. 1 dose of Lokelma ordered. . Nephrology consulted. Chronic kidney disease stage III POA Creatinine is 1.7 this morning at baseline. June 1: Improving. Urinary tract infection POA Urine cultures pending. Continue ceftriaxone. June 1: Growing gram-positive in urine. Cannot rule out Enterococcus at present. Antibiotic changed to vancomycin. No gram-negative on urine although patient has history of ESBL. Overall patient improving. Will monitor. Myoclonus/tremors Hold Neurontin. Neurology consulted. Appreciate recommendations. No myoclonus at this time. As outpatient patient is currently on gabapentin 100 mg 3 times daily. But with CKD stage III, Neurology recommends decreasing frequency of gabapentin. Blood cultures 5/30 negative. June 1 likely secondary to metabolic issues. Need adjustment of gabapentin based on kidney function during hospitalization. Appreciate neurology recommendation. No tremors/myoclonic jerks noted. Chronic hypoxemic respiratory failure from COPD Baseline oxygen not in acute exacerbation. Congestive heart failure with preserved ejection fraction/chronic diastolic CHF/pulmonary hypertension Continue metoprolol. June 1: Lasix on hold as creatinine as well as potassium improving after holding it. May need Lasix soon. Metoprolol on hold given bradycardia. Diabetes mellitus type 2 Sliding scale insulin. Lantus held due to low blood glucose last night. June 1: Blood sugar fluctuating. Continue sliding scale. Will decrease Lantus to 3 units. Transaminitis improved Statin held. Outpatient EUS per GI.  
 
June 1 : Improved. Chronic anemia Hemoglobin stable at 8.4. No active bleeding. Generalized debility PT/OT. Long-term resident at rehab. Diet:  DIET DIABETIC CONSISTENT CARB 
DVT PPx: Lovenox Code status: DNR I have called patient's daughter Yanira Turner who is power of . CODE STATUS verified. Patient is DNR and DNI. No intubation even only in case of respiratory failure. She verbalized understanding that patient condition meditated in spite of treatment Objective:  
 
Patient Vitals for the past 24 hrs: 
 Temp Pulse Resp BP SpO2  
06/01/21 1203 97.2 °F (36.2 °C) 62 17 (!) 162/72 100 % 06/01/21 0746 97.3 °F (36.3 °C) 67 18 137/83 100 % 06/01/21 0450 97.6 °F (36.4 °C) 65 20 (!) 166/75 96 % 06/01/21 0034 97.4 °F (36.3 °C) 72 20 (!) 153/71 94 % 05/31/21 2105 97.5 °F (36.4 °C) 60 20 (!) 163/72 100 % 05/31/21 2054     98 % 05/31/21 1739 97.8 °F (36.6 °C) (!) 53 20 (!) 181/90 99 % Oxygen Therapy O2 Sat (%): 100 % (06/01/21 1203) Pulse via Oximetry: 65 beats per minute (05/30/21 1819) O2 Device: Nasal cannula (06/01/21 1239) Skin Assessment: Clean, dry, & intact (06/01/21 0433) O2 Flow Rate (L/min): 2 l/min (06/01/21 1239) Body mass index is 21.3 kg/m². Physical Exam:  
General:  No acute distress, speaking in full sentences, no use of accessory muscles Lungs:  Decreased air entry bilateral lower lobe otherwise clear to auscultation bilaterally, no wheezing, no crackles, CV:  Regular rate and rhythm with normal S1 and S2, no murmur, rubs or gallops, Abdomen:  Soft, nontender, nondistended, normoactive bowel sounds Extremities:  No cyanosis clubbing or edema Neuro:  Moving all 4 extremities, AOx3. No tremor noted. Psych:  No abnormal behavior noted. Data Review: 
I have reviewed all labs, meds, and studies from the last 24 hours: 
 
Labs: 
 
Procedures done this admission: * No surgery found * All Micro Results  Procedure Component Value Units Date/Time CULTURE, BLOOD [[id number]] Collected: 05/30/21 2025 Order Status: Completed Specimen: Blood Updated: 06/01/21 0759 Special Requests: --     
  RIGHT 
FOREARM Culture result: NO GROWTH 2 DAYS     
 CULTURE, BLOOD [[id number]] Collected: 05/30/21 2114 Order Status: Completed Specimen: Blood Updated: 06/01/21 0759 Special Requests: --     
  LEFT 
HAND Culture result: NO GROWTH 2 DAYS     
 CULTURE, URINE [[id number]]  (Abnormal) Collected: 05/30/21 1910 Order Status: Completed Specimen: Cath Urine Updated: 06/01/21 5686 Special Requests: NO SPECIAL REQUESTS Culture result:    
  >100,000 COLONIES/mL GRAM POSITIVE COCCI SUBCULTURE IN PROGRESS  
     
  
 
 
SARS-CoV-2 Lab Results \"Novel Coronavirus\" Test: No results found for: COV2NT \"Emergent Disease\" Test: No results found for: EDPR \"SARS-COV-2\" Test: No results found for: XGCOVT \"Precision Labs\" Test: No results found for: RSLT Rapid Test:  
Lab Results Component Value Date/Time COVR Not detected 05/10/2021 04:30 PM  
 
  
 
 
Labs: Results:  
   
BMP, Mg, Phos Recent Labs  
  06/01/21 0416 05/31/21 [id number] 05/31/21 
0506 05/30/21 
1831  142 144 144  
K 5.4* 5.7* 5.6* 6.5*  
* 110* 113* 112* CO2 31 28 30 29 AGAP 2* 4* 1* 3*  
BUN 41* 43* 43* 46* CREA 1.59* 1.83* 1.71* 1.87* CA 8.8 9.1 9.0 9.4 * 236* 97 93 MG  --   --   --  2.2 CBC Recent Labs  
  06/01/21 0416 05/31/21 
0506 05/30/21 
1831 WBC 7.1 5.6 7.1  
RBC 2.64* 2.60* 2.74* HGB 8.4* 8.4* 8.9* HCT 26.8* 27.3* 28.6*  
 164 189 GRANS 63 53 61 LYMPH 22 31 27 EOS 3 4 3 MONOS 10 11 9 BASOS 1 1 1 IG 0 0 0 ANEU 4.5 2.9 4.4 ABL 1.6 1.7 1.9 GURDEEP 0.2 0.2 0.2 ABM 0.7 0.6 0.6 ABB 0.1 0.1 0.1 AIG 0.0 0.0 0.0 LFT Recent Labs 05/31/21 
0506 05/30/21 
1831 ALT 28 31 * 164* TP 6.8 7.5 ALB 3.0* 3.2 GLOB 3.8* 4.3* AGRAT 0.8* 0.7* Cardiac Testing Lab Results Component Value Date/Time BNP 22 (H) 01/30/2019 10:32 AM  
 CK 26 05/06/2021 04:53 AM  
 Troponin-I, Qt. <0.02 (L) 03/08/2020 01:58 PM  
  
Coagulation Tests Lab Results Component Value Date/Time Prothrombin time 13.5 05/11/2021 01:14 PM  
 Prothrombin time 13.2 05/06/2021 04:53 AM  
 Prothrombin time 12.4 (L) 03/11/2021 06:12 AM  
 INR 1.0 05/11/2021 01:14 PM  
 INR 1.0 05/06/2021 04:53 AM  
 INR 0.9 03/11/2021 06:12 AM  
 aPTT 29.7 05/06/2021 04:53 AM  
 aPTT 25.4 03/11/2021 06:12 AM  
 aPTT 53.9 (H) 01/05/2021 12:04 PM  
  
A1c Lab Results Component Value Date/Time Hemoglobin A1c 6.0 04/05/2021 09:35 AM  
 Hemoglobin A1c 6.4 (H) 03/12/2021 07:38 AM  
 Hemoglobin A1c 8.4 (H) 12/26/2020 03:45 AM  
  
Lipid Panel Lab Results Component Value Date/Time Cholesterol, total 221 (H) 03/09/2015 02:42 PM  
 HDL Cholesterol 64 03/09/2015 02:42 PM  
 LDL, calculated 114 03/09/2015 02:42 PM  
 VLDL, calculated 43 (H) 03/09/2015 02:42 PM  
 Triglyceride 217 (H) 03/09/2015 02:42 PM  
 CHOL/HDL Ratio 3.5 03/09/2015 02:42 PM  
  
Thyroid Panel Lab Results Component Value Date/Time TSH 4.080 (H) 05/07/2021 04:34 AM  
 TSH 1.260 01/30/2019 10:32 AM  
 T4, Free 0.9 05/07/2021 04:34 AM  
    
Most Recent UA Lab Results Component Value Date/Time  Color YELLOW 03/20/2021 04:00 PM  
 Appearance CLEAR 03/20/2021 04:00 PM  
 Specific gravity 1.013 03/20/2021 04:00 PM  
 pH (UA) 7.5 03/20/2021 04:00 PM  
 Protein 30 (A) 03/20/2021 04:00 PM  
 Glucose Negative 03/20/2021 04:00 PM  
 Ketone Negative 03/20/2021 04:00 PM  
 Bilirubin Negative 03/20/2021 04:00 PM  
 Blood MODERATE (A) 03/20/2021 04:00 PM  
 Urobilinogen 0.2 03/20/2021 04:00 PM  
 Nitrites Negative 03/20/2021 04:00 PM  
 Leukocyte Esterase TRACE (A) 03/20/2021 04:00 PM  
 WBC >100 05/30/2021 07:10 PM  
 RBC 5-10 05/30/2021 07:10 PM  
 Epithelial cells 0 05/30/2021 07:10 PM  
 Bacteria TRACE 05/30/2021 07:10 PM  
 Casts 0 05/30/2021 07:10 PM  
 Crystals, urine 0 05/30/2021 07:10 PM  
 Mucus 0 05/30/2021 07:10 PM  
 Other observations RESULTS VERIFIED MANUALLY 05/05/2021 02:22 PM  
  
 
 
 
Other Studies: No results found. Current Meds:  
Current Facility-Administered Medications Medication Dose Route Frequency  alcohol 62% (NOZIN) nasal  1 Ampule  1 Ampule Topical Q12H  
 sodium chloride (NS) flush 5-40 mL  5-40 mL IntraVENous Q8H  
 sodium chloride (NS) flush 5-40 mL  5-40 mL IntraVENous PRN  
 ascorbic acid (vitamin C) (VITAMIN C) tablet 500 mg  500 mg Oral EVERY OTHER DAY  aspirin delayed-release tablet 81 mg  81 mg Oral DAILY  clopidogreL (PLAVIX) tablet 75 mg  75 mg Oral DAILY  ferrous sulfate tablet 325 mg  1 Tablet Oral EVERY OTHER DAY  [Held by provider] insulin glargine (LANTUS) injection 5 Units  5 Units SubCUTAneous DAILY  [Held by provider] metoprolol succinate (TOPROL-XL) XL tablet 12.5 mg  12.5 mg Oral DAILY  pantoprazole (PROTONIX) tablet 40 mg  40 mg Oral ACB  sodium chloride (NS) flush 5-40 mL  5-40 mL IntraVENous Q8H  
 sodium chloride (NS) flush 5-40 mL  5-40 mL IntraVENous PRN  
 acetaminophen (TYLENOL) tablet 650 mg  650 mg Oral Q6H PRN Or  
 acetaminophen (TYLENOL) suppository 650 mg  650 mg Rectal Q6H PRN  polyethylene glycol (MIRALAX) packet 17 g  17 g Oral DAILY PRN  
 enoxaparin (LOVENOX) injection 30 mg  30 mg SubCUTAneous DAILY  ondansetron (ZOFRAN) injection 4 mg  4 mg IntraVENous Q6H PRN  
 insulin lispro (HUMALOG) injection   SubCUTAneous AC&HS  hydrALAZINE (APRESOLINE) 20 mg/mL injection 10 mg  10 mg IntraVENous Q6H PRN Problem List: 
Hospital Problems as of 6/1/2021 Date Reviewed: 4/19/2021 Codes Class Noted - Resolved POA Myoclonus (Chronic) ICD-10-CM: G25.3 ICD-9-CM: 333.2  5/30/2021 - Present Yes History of COVID-19 (Chronic) ICD-10-CM: Z86.16 
ICD-9-CM: V12.09  5/30/2021 - Present Yes  Diastolic CHF, chronic (HCC) ICD-10-CM: I50.32 ICD-9-CM: 428.32, 428.0  5/5/2021 - Present Yes Pulmonary HTN ([facility] [address] ICD-10-CM: I27.20 ICD-9-CM: 416.8  5/5/2021 - Present Yes Hyperkalemia ICD-10-CM: E87.5 ICD-9-CM: 276.7  5/5/2021 - Present Unknown UTI (urinary tract infection) ICD-10-CM: N39.0 ICD-9-CM: 599.0  5/5/2021 - Present Yes Chronic hypoxemic respiratory failure [facility]) ICD-10-CM: J96.11 
ICD-9-CM: 518.83, 799.02  5/5/2021 - Present Yes Abnormal LFTs ICD-10-CM: R94.5 ICD-9-CM: 790.6  5/5/2021 - Present Yes Anemia ICD-10-CM: D64.9 ICD-9-CM: 285.9  8/8/2019 - Present Yes Uncontrolled type 2 diabetes mellitus with hyperglycemia (HCC) (Chronic) ICD-10-CM: E11.65 ICD-9-CM: 250.02  6/15/2015 - Present Yes Essential hypertension (Chronic) ICD-10-CM: I10 
ICD-9-CM: 401.9  6/15/2015 - Present Yes Chronic kidney disease, stage III (moderate) (HCC) (Chronic) ICD-10-CM: N18.30 ICD-9-CM: 585.3  6/15/2015 - Present Yes Part of this note was written by using a voice dictation software and the note has been proof read but may still contain some grammatical/other typographical errors. Signed By: Joycelyn Massey MD  
[employer] [facility] June 1, 2021

## 2021-06-01 NOTE — ROUTINE PROCESS
Bedside and Verbal shift change report given to [address], RN (oncoming nurse) by Bhavana Carter RN (offgoing nurse). Report included the following information SBAR, Kardex, ED Summary, Procedure Summary, Intake/Output, MAR and Recent Results.

## 2021-06-01 NOTE — PROGRESS NOTES
ACUTE PHYSICAL THERAPY GOALS: 
(Developed with and agreed upon by patient and/or caregiver. ) LTG: 
(1.)Ms. Dunia Bates will move from supine to sit and sit to supine , scoot up and down and roll side to side in bed with MODIFIED INDEPENDENCE within 7 treatment day(s). (2.)Ms. Dunia Bates will transfer from bed to chair and chair to bed with STAND BY ASSIST using the least restrictive device within 7 treatment day(s). (3.)Ms. Dunia Bates will ambulate with CONTACT GUARD ASSIST for 50 feet with the least restrictive device within 7 treatment day(s). (4.)Ms. Dunia Bates will participate in therapeutic activity/exercises x 25 minutes for increased strength within 7 treatment days. PHYSICAL THERAPY: Daily Note and AM Treatment Day # 2 Jose Tidwell is a 78 y.o. female PRIMARY DIAGNOSIS: <principal problem not specified> Hyperkalemia [E87.5] ASSESSMENT:  
 
REHAB RECOMMENDATIONS: CURRENT LEVEL OF FUNCTION: 
(Most Recently Demonstrated) Recommendation to date pending progress: 
Setting:  Short-term Rehab Equipment:  To Be Determined Bed Mobility:  Not tested Sit to Stand:  Moderate Assistance x 2 Transfers:  Minimal Assistance x 2 Gait/Mobility:  Minimal Assistance x 2  
 
ASSESSMENT: 
Ms. Dunia Bates was sitting in recliner and agreeable to PT. She performed several STS transfers and ambulated to/from bathroom with RW and min-modA x 2. Pt returned to recliner after session and left with needs within reach. Pt progressed in ambulation today. SUBJECTIVE:  
Ms. Dunia Bates states, \"I'm not sure. \" SOCIAL HISTORY/ LIVING ENVIRONMENT: see eval 
Home Environment: Skilled nursing facility One/Two Story Residence: One story Living Alone: No 
Support Systems: Family member(s), Child(marga), Skilled nursing facility OBJECTIVE:  
 
PAIN: VITAL SIGNS: LINES/DRAINS:  
Pre Treatment: Pain Screen Pain Scale 1: Numeric (0 - 10) Pain Intensity 1: 0 Post Treatment: 0   P[place] O2 Device: Nasal cannula MOBILITY: I Mod I S SBA CGA Min Mod Max Total  NT x2 Comments:  
Bed Mobility Rolling [] [] [] [] [] [] [] [] [] [] [] Supine to Sit [] [] [] [] [] [] [] [] [] [] [] Scooting [] [] [] [] [] [] [] [] [] [] [] Sit to Supine [] [] [] [] [] [] [] [] [] [] []   
Transfers Sit to Stand [] [] [] [] [] [x] [x] [] [] [] [x] Bed to Chair [] [] [] [] [] [x] [x] [] [] [] [x] Stand to Sit [] [] [] [] [] [x] [x] [] [] [] [x] I=Independent, Mod I=Modified Independent, S=Supervision, SBA=Standby Assistance, CGA=Contact Guard Assistance,  
Min=Minimal Assistance, Mod=Moderate Assistance, Max=Maximal Assistance, Total=Total Assistance, NT=Not Tested BALANCE: Good Fair+ Fair Fair- Poor NT Comments Sitting Static [] [x] [] [] [] [] Sitting Dynamic [] [x] [] [] [] []   
         
Standing Static [] [] [] [x] [] []   
Standing Dynamic [] [] [] [] [x] [] GAIT: I Mod I S SBA CGA Min Mod Max Total  NT x2 Comments:  
Level of Assistance [] [] [] [] [] [x] [x] [] [] [] [] Distance 6 ft x 2 DME Rolling Walker and [employer] Gait Quality Decreased B LE step length Weightbearing Status N/A I=Independent, Mod I=Modified Independent, S=Supervision, SBA=Standby Assistance, CGA=Contact Guard Assistance,  
Min=Minimal Assistance, Mod=Moderate Assistance, Max=Maximal Assistance, Total=Total Assistance, NT=Not Tested PLAN:  
FREQUENCY/DURATION: PT Plan of Care: 3 times/week for duration of hospital stay or until stated goals are met, whichever comes first. 
TREATMENT:  
 
TREATMENT:  
($$ Therapeutic Activity: (P) 23-37 mins    ) Therapeutic Activity (28 Minutes): Therapeutic activity included Scooting, Transfer Training, Ambulation on level ground, Sitting balance  and Standing balance to improve functional Mobility, Strength and Activity tolerance. TREATMENT GRID: 
N/A 
 
AFTER TREATMENT POSITION/PRECAUTIONS: 
Alarm Activated, Chair and Needs within reach INTERDISCIPLINARY COLLABORATION: RN/PCT, PT/PTA and Rehab Attendant TOTAL TREATMENT DURATION: 
PT Patient Time In/Time Out Time In: 9823 Time Out: 1013 Yara Fortune PT, RAFAELT

## 2021-06-02 NOTE — ANESTHESIA PREPROCEDURE EVALUATION
Relevant Problems CARDIOVASCULAR  
(+) CHF exacerbation (HCC)  
(+) Cardiac arrest (HCC)  
(+) Coronary artery disease involving coronary bypass graft of native heart without angina pectoris  
(+) Essential hypertension GASTROINTESTINAL  
(+) Gastroesophageal reflux disease without esophagitis RENAL FAILURE  
(+) Acute on chronic renal failure (HCC)  
(+) Chronic kidney disease, stage III (moderate) (HCC) ENDOCRINE  
(+) Diabetes mellitus with hyperosmolarity without hyperglycemic hyperosmolar nonketotic coma ([facility] [address] HEMATOLOGY  
(+) Anemia Anesthetic History No history of anesthetic complications Review of Systems / Medical History Patient summary reviewed, nursing notes reviewed and pertinent labs reviewed Pulmonary Comments: Covid in Dec 2020- with resultant resp failure- trached Neuro/Psych Comments: Encephalopathy- waxing and waning mental status- currently alert and oriented Cardiovascular Hypertension: well controlled CAD, cardiac stents and hyperlipidemia Exercise tolerance: <4 METS Comments: TTE 4/2021 EF 60%, Mild MR; Mild pulm HTN Denies CP  
GI/Hepatic/Renal 
  
GERD Renal disease (recent ARF that required some dialysis- no longer requiring): CRI Endo/Other Diabetes: well controlled, type 2 Arthritis and anemia Other Findings Comments: Presented with AMS found to have volume overload and transaminitis on previous admission Hyperkalemia now resolved Back on Lasix Prior extensive Covid hospital course Physical Exam 
 
Airway Mallampati: II 
TM Distance: 4 - 6 cm Neck ROM: normal range of motion Mouth opening: Normal 
 
 Cardiovascular Rhythm: regular Rate: normal 
 
 
 
 Dental 
 
Dentition: Poor dentition Pulmonary Decreased breath sounds: bilateral 
 
 
 
 
 Abdominal 
GI exam deferred Other Findings Anesthetic Plan ASA: 3 Anesthesia type: total IV anesthesia Induction: Intravenous Anesthetic plan and risks discussed with: Patient

## 2021-06-02 NOTE — ANESTHESIA POSTPROCEDURE EVALUATION
Procedure(s): ENDOSCOPIC ULTRASOUND (EUS)/ 21/ 318/ [place] [address]. 
 
total IV anesthesia Anesthesia Post Evaluation Multimodal analgesia: multimodal analgesia not used between 6 hours prior to anesthesia start to PACU discharge Patient location during evaluation: bedside Patient participation: complete - patient participated Level of consciousness: awake Pain score: 1 Pain management: adequate Airway patency: patent Anesthetic complications: no 
Cardiovascular status: acceptable Respiratory status: acceptable Hydration status: acceptable Comments: Pt doing well. Post anesthesia nausea and vomiting:  none Final Post Anesthesia Temperature Assessment:  Normothermia (36.0-37.5 degrees C) INITIAL Post-op Vital signs:  
Vitals Value Taken Time /74 06/02/21 1157 Temp Pulse 70 06/02/21 1203 Resp 20 06/02/21 1147 SpO2 100 % 06/02/21 1203 Vitals shown include unvalidated device data.

## 2021-06-02 NOTE — PROGRESS NOTES
TRANSFER - OUT REPORT: 
 
Verbal report given to Rosario Go RN on Brandon Rincon  being transferred to [phone] for routine progression of care Report consisted of patients Situation, Background, Assessment and  
Recommendations(SBAR). Information from the following report(s) SBAR was reviewed with the receiving nurse. Lines:  
Peripheral IV 05/30/21 Left Antecubital (Active) Site Assessment Clean, dry, & intact 06/02/21 0840 Phlebitis Assessment 0 06/02/21 0840 Infiltration Assessment 0 06/02/21 0840 Dressing Status Clean, dry, & intact 06/02/21 0840 Dressing Type Tape;Transparent 06/02/21 0840 Hub Color/Line Status Patent 06/02/21 0840 Alcohol Cap Used No 05/31/21 0400 Opportunity for questions and clarification was provided. Patient transported with: 
 O2 @ 4 liters

## 2021-06-02 NOTE — H&P
History and Physical for Endoscopic Ultrasound Date: 6/2/2021 History of Present Illness:  Patient presents to undergo endoscopic ultrasound for evaluation of a dilated common bile duct seen on abdominal imaging. Patient denies any diarrhea, constipation, visible GI bleeding, fevers or chills. Past Medical History:  
Diagnosis Date  Acute cystitis without hematuria 1/30/2019  Acute pancreatitis 8/12/2019  CAD (coronary artery disease)  Diabetic ketoacidosis ([facility] [address] 3/8/2020  DM2 (diabetes mellitus, type 2) ([facility] [address]  Elevated liver function tests 8/8/2019  Encephalopathy 2/7/2021  Gout  Gram-negative bacteremia 8/9/2019  HLD (hyperlipidemia)  HTN (hypertension)  Peripheral neuropathy  Right lower quadrant abdominal pain 8/8/2019 Past Surgical History:  
Procedure Laterality Date  HX CHOLECYSTECTOMY jennifer in 1964  HX CORONARY ARTERY BYPASS GRAFT    
 x3  
 HX TUBAL LIGATION    
 IR INSERT NON TUNL CVC OVER 5 YRS  1/11/2021  [place] CARDIAC SURG PROCEDURE UNLIST    
 stents x 3 2009 Family History Problem Relation Age of Onset  Hypertension Mother  Cancer Mother  Diabetes Mother  Heart Disease Mother Social History Tobacco Use  Smoking status: Never Smoker  Smokeless tobacco: Never Used Substance Use Topics  Alcohol use: Yes Comment: socially Allergies Allergen Reactions  Sulfa (Sulfonamide Antibiotics) Swelling Current Facility-Administered Medications Medication Dose Route Frequency  vancomycin (VANCOCIN) 1,000 mg in 0.9% sodium chloride 250 mL (VIAL-MATE)  1,000 mg IntraVENous Q24H  
 insulin glargine (LANTUS) injection 3 Units  3 Units SubCUTAneous DAILY  labetaloL (NORMODYNE;TRANDATE) injection 10 mg  10 mg IntraVENous Q4H PRN  
 alcohol 62% (NOZIN) nasal  1 Ampule  1 Ampule Topical Q12H  
 sodium chloride (NS) flush 5-40 mL  5-40 mL IntraVENous Q8H  
 sodium chloride (NS) flush 5-40 mL  5-40 mL IntraVENous PRN  
 ascorbic acid (vitamin C) (VITAMIN C) tablet 500 mg  500 mg Oral EVERY OTHER DAY  aspirin delayed-release tablet 81 mg  81 mg Oral DAILY  clopidogreL (PLAVIX) tablet 75 mg  75 mg Oral DAILY  ferrous sulfate tablet 325 mg  1 Tablet Oral EVERY OTHER DAY  [Held by provider] metoprolol succinate (TOPROL-XL) XL tablet 12.5 mg  12.5 mg Oral DAILY  pantoprazole (PROTONIX) tablet 40 mg  40 mg Oral ACB  sodium chloride (NS) flush 5-40 mL  5-40 mL IntraVENous Q8H  
 sodium chloride (NS) flush 5-40 mL  5-40 mL IntraVENous PRN  
 acetaminophen (TYLENOL) tablet 650 mg  650 mg Oral Q6H PRN Or  
 acetaminophen (TYLENOL) suppository 650 mg  650 mg Rectal Q6H PRN  polyethylene glycol (MIRALAX) packet 17 g  17 g Oral DAILY PRN  
 enoxaparin (LOVENOX) injection 30 mg  30 mg SubCUTAneous DAILY  ondansetron (ZOFRAN) injection 4 mg  4 mg IntraVENous Q6H PRN  
 insulin lispro (HUMALOG) injection   SubCUTAneous AC&HS  hydrALAZINE (APRESOLINE) 20 mg/mL injection 10 mg  10 mg IntraVENous Q6H PRN Review of Systems: A detailed 10 organ review of systems is obtained with pertinent positives as listed in the History of Present Illness. All others are negative. Objective:  
 
Physical Exam: 
Visit Vitals BP (!) 151/76 Pulse 74 Temp 97.6 °F (36.4 °C) Resp 17 Ht 5' 5\" (1.651 m) Wt 58.1 kg (128 lb) SpO2 94% BMI 21.30 kg/m² General:  Alert and oriented. Heart: Regular rate and rhythm Lungs:  Clear to auscultation bilaterally Abdomen: Soft, nontender, nondistended Impression/Plan:  
 
Proceed with EUS as planned. I have discussed with the patient the technique, benefits, alternatives, and risks of the procedure, including medication reaction, immediate or delayed bleeding, or perforation of the gastrointestinal tract. Signed By: Kelby Dickerson MD   
 June 2, 2021

## 2021-06-02 NOTE — PROGRESS NOTES
TRANSFER - IN REPORT: 
 
Verbal report received from Thomas Ronquillo RN(name) on Shelly Halon  being received from 318(unit) for ordered procedure Report consisted of patients Situation, Background, Assessment and  
Recommendations(SBAR). Information from the following report(s) SBAR, Intake/Output, MAR, Recent Results and Procedure Verification was reviewed with the receiving nurse. Opportunity for questions and clarification was provided.

## 2021-06-02 NOTE — OP NOTES
Gastroenterology Procedure Note Procedure:  Endoscopic ultrasound with Doppler Date of Procedure:  6/2/2021 Patient:  Willis Siddiqui     1940 Indication:  Dilated common bile duct, Dilated pancreatic duct Sedation:  MAC Pre-Procedure Physical Exam: 
 
Mental status:  alert and oriented Airway:  normal oropharyngeal airway and neck mobility CV:  regular rate and rhythm Respiratory:  clear to auscultation Procedure: A History and Physical has been performed, and patient medication allergies have been reviewed. Risks of perforation, hemorrhage, adverse drug reaction, and aspiration were discussed. Informed consent was obtained for the procedure, including sedation. The patient was placed in the left lateral decubitus position. The heart rate, oxygen saturations, blood pressure, and response to care were monitored throughout the procedure. The linear echoendoscope was passed through the mouth and advanced under direct vision to the distal duodenum. As the scope was slowly withdrawn, detailed endoscopic images were obtained from the surrounding organs. The patient tolerated the procedure well. Findings:  
 
ENDOSCOPIC FINDINGS:  Limited views of the mucosa with the echoendoscope reveals no gross abnormalities of the stomach or proximal duodenum. The ampulla is well-visualized endoscopically and appears normal. 
 
PANCREAS:  The pancreas is well-visualized from head to tail. There is no evidence of chronic pancreatitis. No cysts or masses are visualized. The main pancreatic duct is dilated throughout with an ectatic course measuring up to 6 mm in the head, 4 mm in the body and 2 mm and the tail. Pancreas divisum is not seen. BILIARY TREE: The gallbladder is surgically absent. The common bile duct is well-visualized from its insertion in the ampulla to the bifurcation of right and left hepatic ducts.  It is dilated, measuring up to 12 mm maximally but gradually taper down to the level of the ampulla. There are no intraductal stones, sludge or debris. The ampulla appears normal endosonographically. OTHER ORGANS:  Views of the left lobe of the liver demonstrate no solid mass lesions. There is no ascites in the upper abdomen. The left adrenal gland appears normal. The major vascular structures including the portal vein, splenic vessels and celiac artery appear unremarkable. There are no pathologically enlarged posterior mediastinal or upper abdominal lymph nodes. Specimen:  No 
 
Estimated Blood Loss:  None Implant:  None Impression: 1. Dilated common bile duct. In the absence of biliary obstruction, this likely represents benign post-cholecystectomy biliary dilatation. 2. Dilated pancreatic duct. 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 There not currently worrisome features but there has been slight interval increase in caliber of the duct. Would advise ongoing radiographic and/or endosonographic surveillance. Plan: 1. Resume diet and current medications. 2. Return to office in 2 months for ongoing care.  
 
Signed: 
Josiah Yoo MD 
6/2/2021 
11:21 AM

## 2021-06-02 NOTE — PROGRESS NOTES
Hospitalist Progress Note Admit Date:  2021  6:11 PM  
Name:  Allison Calix Age:  78 y.o. 
:  1940 MRN:  [id number] PCP:  Ivett Cosme MD 
Presenting Complaint: Other Initial Admission Diagnosis: Hyperkalemia [E87.5] Assessment and Plan:  
 
Hospital Problems as of 2021 Date Reviewed: 2021 Codes Class Noted - Resolved POA Myoclonus (Chronic) ICD-10-CM: G25.3 ICD-9-CM: 333.2  2021 - Present Yes History of COVID-19 (Chronic) ICD-10-CM: Z86.16 
ICD-9-CM: V12.09  2021 - Present Yes Diastolic CHF, chronic (HCC) ICD-10-CM: I50.32 
ICD-9-CM: 428.32, 428.0  2021 - Present Yes Pulmonary HTN ([facility] [address] ICD-10-CM: I27.20 ICD-9-CM: 416.8  2021 - Present Yes * (Principal) Hyperkalemia ICD-10-CM: E87.5 ICD-9-CM: 276.7  2021 - Present Unknown UTI (urinary tract infection) ICD-10-CM: N39.0 ICD-9-CM: 599.0  2021 - Present Yes Chronic hypoxemic respiratory failure [facility]) ICD-10-CM: J96.11 
ICD-9-CM: 518.83, 799.02  2021 - Present Yes Abnormal LFTs ICD-10-CM: R94.5 ICD-9-CM: 790.6  2021 - Present Yes Anemia ICD-10-CM: D64.9 ICD-9-CM: 285.9  2019 - Present Yes Uncontrolled type 2 diabetes mellitus with hyperglycemia (HCC) (Chronic) ICD-10-CM: E11.65 ICD-9-CM: 250.02  6/15/2015 - Present Yes Essential hypertension (Chronic) ICD-10-CM: I10 
ICD-9-CM: 401.9  6/15/2015 - Present Yes Chronic kidney disease, stage III (moderate) (HCC) (Chronic) ICD-10-CM: N18.30 ICD-9-CM: 585.3  6/15/2015 - Present Yes Plan: # HyperK - ARB held, CKD at baseline, venous CO2 normal but has been low in the past, suggestive of RTA? Nephrology to see today. Add serum/urine osm, urine K to calculate TTKG. # Myclonus/tremors - Seen by Neuro and felt related to gabapentin (especially in setting of CKD), so was held. Now resolved.  
 
# Biliary ductal dilation 
 - EUS today (was to have done as outpatient). # Enterococcal UTI 
 - Final ID/sens pending, con't vancomycin for now. # CKD3 
 - At baseline. ARB on hold with hyperK. # Generalized weakness/debility - Chronic resident of [facility] # Chronic anemia 
 - No bleeding, Hb stable. Other listed chronic conditions stable, continue current management. Discharge planning: Back to facility when able. Diet:  DIET NPO 
DVT ppx: [facility] Course:  
Mrs. Juan Toribio is a. 79 y/o AAF with a h/o HTN, HFpEF, DM2, CKD3 with prior short term dialysis, COPD on 2 L NC with chronic hypoxic respiratory failure, COVID-19 12/2020 s/p trach now decanulated, pHTN, who was admitted to our service on 5/30 with tremors. She currently is a longterm resident at [facility]. Since her prior COVID illness 12/2020 she has had numerous complications and admissions. She was discharged to [facility] on 5-12-21 after a stay for encephalopathy, Klebsiella UTI, workup of elevated LFTs, volume overload, hyperkalemia. She is pending outpatient EUS per GI on 6-2-21 for biliary ductal dilation. Her losartan was stopped. She was resumed on oral lasix. She was at her baseline health but was noted to have increased tremors and extremity jerking. She did have some generalized weakness and is essentially wheelchair bound since COVID with extremity jerking/ tremors. Upon visiting on day of admission, daughter noted she seemed worse and had her sent to the ED. K was 6.5 and UA with UTI. She has received insulin/dextrose/ IVF bolus. EKG showed mildly peaked T waves. ARB held, Louetta Nakai was given. Patient was evaluated by Neuro and gabapentin held. Myoclonus resolved. K intermittent elevated still. GI consulted as pt was to have outpatient EUS on 6/2. 
 
24hr Events/Subjective:  
6/2: In chair sleeping, no SOB or chest pain. To have EUS today. No N/V/D or abdominal pain. ROS neg otherwise. No other complaints Objective:  
 
Patient Vitals for the past 24 hrs: 
 Temp Pulse Resp BP SpO2  
06/02/21 0837 97.8 °F (36.6 °C) 78 17 (!) 153/78 100 % 06/02/21 0451 97.6 °F (36.4 °C) 74 17 (!) 151/76 94 % 06/02/21 0101 97.5 °F (36.4 °C) 69 17 (!) 174/81 100 % 06/01/21 2008     99 % 06/01/21 1932 97.6 °F (36.4 °C) 61 17 (!) 169/75 100 % 06/01/21 1728 97.2 °F (36.2 °C) 63 17 (!) 145/71 100 % 06/01/21 1203 97.2 °F (36.2 °C) 62 17 (!) 162/72 100 % Oxygen Therapy O2 Sat (%): 100 % (06/02/21 0837) Pulse via Oximetry: 67 beats per minute (06/01/21 2008) O2 Device: Nasal cannula (06/02/21 0840) Skin Assessment: Clean, dry, & intact (06/02/21 0430) Skin Protection for O2 Device: No (06/02/21 0430) O2 Flow Rate (L/min): 2 l/min (06/02/21 0840) Estimated body mass index is 21.3 kg/m² as calculated from the following: 
  Height as of this encounter: 5' 5\" (1.651 m). Weight as of this encounter: 58.1 kg (128 lb). Intake/Output Summary (Last 24 hours) at 6/2/2021 1230 Last data filed at 6/1/2021 1226 Gross per 24 hour Intake 120 ml Output 550 ml Net -430 ml  
   
*Note that automatically entered I/Os may not be accurate; dependent on patient compliance with collection and accurate  by [employer]. General:    Well nourished. No overt distress CV:   RRR. No m/r/g. No edema. No JVD Lungs:   CTAB. No wheezing, rhonchi, or rales. Unlabored Abdomen:   Soft, nontender, nondistended. Extremities: Warm and dry. No cyanosis. B/l LE stasis dermatitis. Skin:     No rashes. Normal coloration Neuro:  No gross focal deficits. I have reviewed all labs, meds, and other studies shown below: 
Last 24hr Labs: 
Recent Results (from the past 24 hour(s)) GLUCOSE, POC Collection Time: 06/01/21 11:16 AM  
Result Value Ref Range Glucose (POC) 241 (H) 65 - 100 mg/dL Performed by Ryan Mendez POTASSIUM Collection Time: 06/01/21  2:35 PM  
Result Value Ref Range Potassium 5.5 (H) 3.5 - 5.1 mmol/L  
GLUCOSE, POC  Collection Time: 06/01/21 3:58 PM  
Result Value Ref Range Glucose (POC) 194 (H) 65 - 100 mg/dL Performed by [place] GLUCOSE, POC Collection Time: 06/01/21  9:12 PM  
Result Value Ref Range Glucose (POC) 185 (H) 65 - 100 mg/dL Performed by [facility] METABOLIC PANEL, BASIC Collection Time: 06/02/21  5:32 AM  
Result Value Ref Range Sodium 143 136 - 145 mmol/L Potassium 4.9 3.5 - 5.1 mmol/L Chloride 110 (H) 98 - 107 mmol/L  
 CO2 29 21 - 32 mmol/L Anion gap 4 (L) 7 - 16 mmol/L Glucose 106 (H) 65 - 100 mg/dL BUN 38 (H) 8 - 23 MG/DL Creatinine 1.62 (H) 0.6 - 1.0 MG/DL  
 GFR est AA 39 (L) >60 ml/min/1.73m2 GFR est non-AA 32 (L) >60 ml/min/1.73m2 Calcium 8.6 8.3 - 10.4 MG/DL  
GLUCOSE, POC Collection Time: 06/02/21  7:27 AM  
Result Value Ref Range Glucose (POC) 118 (H) 65 - 100 mg/dL Performed by Isaiah Caldwell [employer] Results Procedure Component Value Units Date/Time CULTURE, URINE [[id number]]  (Abnormal) Collected: 05/30/21 1910 Order Status: Completed Specimen: Cath Urine Updated: 06/02/21 5930 Special Requests: NO SPECIAL REQUESTS Culture result:    
  >100,000 COLONIES/mL PRESUMPTIVE ENTEROCOCCUS SPECIES IDENTIFICATION AND SUSCEPTIBILITY TO FOLLOW CULTURE IN PROGRESS,FURTHER UPDATES TO FOLLOW CULTURE, BLOOD [[id number]] Collected: 05/30/21 2025 Order Status: Completed Specimen: Blood Updated: 06/02/21 6947 Special Requests: --     
  RIGHT 
FOREARM Culture result: NO GROWTH 3 DAYS     
 CULTURE, BLOOD [[id number]] Collected: 05/30/21 2114 Order Status: Completed Specimen: Blood Updated: 06/02/21 3905 Special Requests: --     
  LEFT 
HAND Culture result: NO GROWTH 3 DAYS     
  
 
 
SARS-CoV-2 Lab Results \"Novel Coronavirus\" Test: No results found for: COV2NT \"Emergent Disease\" Test: No results found for: EDPR \"SARS-COV-2\" Test: No results found for: XGCOVT Rapid Test:  
Lab Results Component Value Date/Time COVR Not detected 05/10/2021 04:30 PM  
 
  
 
 
Current Meds: 
Current Facility-Administered Medications Medication Dose Route Frequency  VANCOMYCIN INTERMITTENT DOSING   Other Rx Dosing/Monitoring  insulin glargine (LANTUS) injection 3 Units  3 Units SubCUTAneous DAILY  labetaloL (NORMODYNE;TRANDATE) injection 10 mg  10 mg IntraVENous Q4H PRN  
 alcohol 62% (NOZIN) nasal  1 Ampule  1 Ampule Topical Q12H  
 sodium chloride (NS) flush 5-40 mL  5-40 mL IntraVENous Q8H  
 sodium chloride (NS) flush 5-40 mL  5-40 mL IntraVENous PRN  
 ascorbic acid (vitamin C) (VITAMIN C) tablet 500 mg  500 mg Oral EVERY OTHER DAY  aspirin delayed-release tablet 81 mg  81 mg Oral DAILY  clopidogreL (PLAVIX) tablet 75 mg  75 mg Oral DAILY  ferrous sulfate tablet 325 mg  1 Tablet Oral EVERY OTHER DAY  [Held by provider] metoprolol succinate (TOPROL-XL) XL tablet 12.5 mg  12.5 mg Oral DAILY  pantoprazole (PROTONIX) tablet 40 mg  40 mg Oral ACB  sodium chloride (NS) flush 5-40 mL  5-40 mL IntraVENous Q8H  
 sodium chloride (NS) flush 5-40 mL  5-40 mL IntraVENous PRN  
 acetaminophen (TYLENOL) tablet 650 mg  650 mg Oral Q6H PRN Or  
 acetaminophen (TYLENOL) suppository 650 mg  650 mg Rectal Q6H PRN  polyethylene glycol (MIRALAX) packet 17 g  17 g Oral DAILY PRN  
 enoxaparin (LOVENOX) injection 30 mg  30 mg SubCUTAneous DAILY  ondansetron (ZOFRAN) injection 4 mg  4 mg IntraVENous Q6H PRN  
 insulin lispro (HUMALOG) injection   SubCUTAneous AC&HS  hydrALAZINE (APRESOLINE) 20 mg/mL injection 10 mg  10 mg IntraVENous Q6H PRN Other Studies: No results found for this visit on 05/30/21. No results found.  
 
Signed: 
Cheryl Coyne MD

## 2021-06-02 NOTE — PROGRESS NOTES
Spiritual Care Visit, initial visit. Visited with patient at bedside. Prayed for patient's healing and health. Visit by Keon Pena, Staff .  Aaron, Odalys.B., B.A.

## 2021-06-02 NOTE — ROUTINE PROCESS
Bedside and Verbal shift change report given to Trice Rowell RN (oncoming nurse) by self (offgoing nurse). Report included the following information SBAR, Kardex, Procedure Summary, Intake/Output, MAR and Recent Results.

## 2021-06-02 NOTE — ROUTINE PROCESS
Bedside and Verbal shift change report given to self (oncoming nurse) by Abby Forte RN (offgoing nurse). Report included the following information SBAR, Kardex, Procedure Summary, Intake/Output, MAR and Recent Results.

## 2021-06-02 NOTE — CONSULTS
[facility] CONSULT NOTE Admission Date: 
5/30/2021 Admission Diagnosis: Hyperkalemia [E87.5] Consulting physician: Dr. Lyon Seen Reason for consult: Hyperkalemia Subjective:  
History of Present Illness: 
Jesus Hurst is a 78 y.o. female with a PMH including DM/CAD/HTN/CKD, previous COVID with ATN requiring dialysis was admitted on 5/30 after having tremors. She presented from [facility]. She has been having jerking movements / tremors since she was discharged from the hospital after her [place] admission. Due to recent worsening of these movements, she was sent to the ED and found to have a potassium of 6.5. Her ARB was held and she was given Lokelma. Gabapentin was held and myoclonus resolved. GI was consulted given outpatient plans for an EUS (which was completed earlier today). Nephrology consulted for hyperkalemia. She has had hyperkalemia consistently for the past month. Currently she is resting in bed without distress. On room air. Afebrile, eating lunch. Daughter is at bedside who provides some details. Past Medical History:  
Diagnosis Date  Acute cystitis without hematuria 1/30/2019  Acute pancreatitis 8/12/2019  CAD (coronary artery disease)  Diabetic ketoacidosis ([address] 3/8/2020  DM2 (diabetes mellitus, type 2) ([facility] [address]  Elevated liver function tests 8/8/2019  Encephalopathy 2/7/2021  Gout  Gram-negative bacteremia 8/9/2019  HLD (hyperlipidemia)  HTN (hypertension)  Peripheral neuropathy  Right lower quadrant abdominal pain 8/8/2019 Past Surgical History:  
Procedure Laterality Date  HX CHOLECYSTECTOMY jennifer in 1964  HX CORONARY ARTERY BYPASS GRAFT    
 x3  
 HX TUBAL LIGATION    
 IR INSERT NON TUNL CVC OVER 5 YRS  1/11/2021  [place] CARDIAC SURG PROCEDURE UNLIST    
 stents x 3 2009 Current Facility-Administered Medications Medication Dose Route Frequency  VANCOMYCIN INTERMITTENT DOSING   Other Rx Dosing/Monitoring  insulin glargine (LANTUS) injection 3 Units  3 Units SubCUTAneous DAILY  labetaloL (NORMODYNE;TRANDATE) injection 10 mg  10 mg IntraVENous Q4H PRN  
 alcohol 62% (NOZIN) nasal  1 Ampule  1 Ampule Topical Q12H  
 sodium chloride (NS) flush 5-40 mL  5-40 mL IntraVENous Q8H  
 sodium chloride (NS) flush 5-40 mL  5-40 mL IntraVENous PRN  
 ascorbic acid (vitamin C) (VITAMIN C) tablet 500 mg  500 mg Oral EVERY OTHER DAY  aspirin delayed-release tablet 81 mg  81 mg Oral DAILY  clopidogreL (PLAVIX) tablet 75 mg  75 mg Oral DAILY  ferrous sulfate tablet 325 mg  1 Tablet Oral EVERY OTHER DAY  [Held by provider] metoprolol succinate (TOPROL-XL) XL tablet 12.5 mg  12.5 mg Oral DAILY  pantoprazole (PROTONIX) tablet 40 mg  40 mg Oral ACB  sodium chloride (NS) flush 5-40 mL  5-40 mL IntraVENous Q8H  
 sodium chloride (NS) flush 5-40 mL  5-40 mL IntraVENous PRN  
 acetaminophen (TYLENOL) tablet 650 mg  650 mg Oral Q6H PRN Or  
 acetaminophen (TYLENOL) suppository 650 mg  650 mg Rectal Q6H PRN  polyethylene glycol (MIRALAX) packet 17 g  17 g Oral DAILY PRN  
 enoxaparin (LOVENOX) injection 30 mg  30 mg SubCUTAneous DAILY  ondansetron (ZOFRAN) injection 4 mg  4 mg IntraVENous Q6H PRN  
 insulin lispro (HUMALOG) injection   SubCUTAneous AC&HS  hydrALAZINE (APRESOLINE) 20 mg/mL injection 10 mg  10 mg IntraVENous Q6H PRN Allergies Allergen Reactions  Sulfa (Sulfonamide Antibiotics) Swelling Social History Tobacco Use  Smoking status: Never Smoker  Smokeless tobacco: Never Used Substance Use Topics  Alcohol use: Yes Comment: socially Family History Problem Relation Age of Onset  Hypertension Mother  Cancer Mother  Diabetes Mother  Heart Disease Mother Review of Systems Gen - no fever, no chills, appetite okay HEENT - no sore throat, no decreased vision, no hearing loss Neck - no neck mass CV - no chest pain, no palpitation, no orthopnea Lung - no shortness of breath, no cough, no hemoptysis Abd - no tenderness, no nausea/vomiting, no bloody stool Ext - no edema, no clubbing, no cyanosis Musculoskeletal - no joint pain, no back pain Neurologic - no headaches, no dizziness, no seizures Psychiatric - no anxiety, no depression Skin - no rashes, no pupura Genitourinary - no decreased urine output, no hematuria, no foamy urine Objective:  
Vitals:  
 06/02/21 1137 06/02/21 1147 06/02/21 1157 06/02/21 1208 BP: (!) 105/53 (!) 147/68 (!) 157/74 (!) 167/74 Pulse: 79 79 72 71 Resp:  20 18 20 Temp:      
SpO2: 99% 100% 100% 100% Weight:      
Height:      
 
 
Intake/Output Summary (Last 24 hours) at 6/2/2021 1245 Last data filed at 6/2/2021 1117 Gross per 24 hour Intake 300 ml Output 0 ml Net 300 ml Physical Exam 
GEN :in no distress, resting comfortably HEENT: anicteric sclerae, trachea midline Neck - supple, no thyromegaly CV - regular rate and rhythm, no audible murmur Lung - equal chest rise, no audible wheezing Abd - soft, nontender, bowel sounds present Ext - no clubbing, no cyanosis, no edema Neurologic - alert, following commands Skin - no rashes, no cyanosis Psychiatric: Normal mood and affect. Cooperative. Data Review:  
Recent Labs  
  06/01/21 
0416 05/31/21 
0506 05/30/21 
1831 WBC 7.1 5.6 7.1 HGB 8.4* 8.4* 8.9* HCT 26.8* 27.3* 28.6*  
 164 189 Recent Labs  
  06/02/21 
0532 06/01/21  06/01/21 
0416 05/31/21 [id number] 05/31/21 
0112 05/30/21 
1831   --  145 142   < > 144 K 4.9 5.5* 5.4* 5.7*  --  6.5*  
*  --  112* 110*   < > 112* CO2 29  --  31 28   < > 29 BUN 38*  --  41* 43*   < > 46* CREA 1.62*  --  1.59* 1.83*   < > 1.87* *  --  114* 236*   < > 93  
CA 8.6  --  8.8 9.1   < > 9.4 MG  --   --   --   --   --  2.2  
 < > = values in this interval not displayed.   
 
No results for input(s): PH, PCO2, PO2, PCO2 in the last 72 hours. Assessment and Plan: Hyperkalemia - resolved for now. Starting low potassium diet. Avoid ACE/ ARB. Consider stopping Lovenox. Will see how labs evolve off Lokelma. ? Type 4 RTA due to DM - but would expect a lower bicarb. Hypertension - start Lasix for BP control and kaliuresis CKD IIIb- baseline Cr: 1.4-1.7mg/dL. at baseline. UTI - per primary Andrés Cruz MD 
[facility]

## 2021-06-02 NOTE — PROGRESS NOTES
Attempted to get consent signed by patient. Patient states that her daughter requested her not to sign anything. Will leave consent in chart and pass along to day shift. Someone will need to get in contact with patient daughter regarding procedure. She asked to be notified.

## 2021-06-02 NOTE — PROGRESS NOTES
Brief GI Note: 
 
EUS completed today without worrisome features. Plan:  Resume diet. We will sign off and arrange outpatient follow up in about one month to reassess LFTs. She will need repeat imaging in one year (CT vs MRI with pancreatic protocol).   
 
Tracy Shaikh

## 2021-06-02 NOTE — PROGRESS NOTES
ACUTE PHYSICAL THERAPY GOALS: 
(Developed with and agreed upon by patient and/or caregiver. ) LTG: 
(1.)Ms. Faviola Rodríguez will move from supine to sit and sit to supine , scoot up and down and roll side to side in bed with MODIFIED INDEPENDENCE within 7 treatment day(s). (2.)Ms. Faviola Rodríguez will transfer from bed to chair and chair to bed with STAND BY ASSIST using the least restrictive device within 7 treatment day(s). (3.)Ms. Faviola Rodríguez will ambulate with CONTACT GUARD ASSIST for 50 feet with the least restrictive device within 7 treatment day(s). (4.)Ms. Favioal Rodríguez will participate in therapeutic activity/exercises x 25 minutes for increased strength within 7 treatment days. PHYSICAL THERAPY: Daily Note and PM Treatment Day # 3 Yoon Rivas is a 78 y.o. female PRIMARY DIAGNOSIS: Hyperkalemia Hyperkalemia [E87.5] Procedure(s) (LRB): ENDOSCOPIC ULTRASOUND (EUS)/ 21/ 318/ RECOVERY BAY 6 (N/A) Day of Surgery ASSESSMENT:  
 
REHAB RECOMMENDATIONS: CURRENT LEVEL OF FUNCTION: 
(Most Recently Demonstrated) Recommendation to date pending progress: 
Setting:  Short-term Rehab Equipment:  To Be Determined Bed Mobility: 
[address] [facility] [place] Sit to Stand:  Minimal Assistance Transfers:  Minimal Assistance Gait/Mobility:  Minimal Assistance ASSESSMENT: 
Ms. Faviola Rodríguez was supine in bed on arrival. She is agreeable to PT. Bed mobility with SBA. She transferred over to the chair with min A. She then ambulated down the hallway for 30' with RW and min A for balance. SUBJECTIVE:  
Ms. Faviola Rodríguez states, \"Do you think I will ever be able to walk again? \" SOCIAL HISTORY/ LIVING ENVIRONMENT: see eval 
Home Environment: Skilled nursing facility One/Two Story Residence: One story Living Alone: No 
Support Systems: Family member(s), Child(marga), Skilled nursing facility OBJECTIVE:  
 
PAIN: VITAL SIGNS: LINES/DRAINS:  
Pre Treatment:   
Post Treatment: 0   Purewick O2 Device: Nasal cannula MOBILITY: I Mod I S SBA CGA Min Mod Max Total  NT x2 Comments:  
Bed Mobility Rolling [] [] [] [] [] [] [] [] [] [] [] Supine to Sit [] [] [] [x] [] [] [] [] [] [] [] Scooting [] [] [] [x] [] [] [] [] [] [] [] Sit to Supine [] [] [] [] [] [] [] [] [] [x] [] Transfers Sit to Stand [] [] [] [] [] [x] [] [] [] [] [] Bed to Chair [] [] [] [] [] [x] [] [] [] [] []   
Stand to Sit [] [] [] [] [] [x] [] [] [] [] [] I=Independent, Mod I=Modified Independent, S=Supervision, SBA=Standby Assistance, CGA=Contact Guard Assistance,  
Min=Minimal Assistance, Mod=Moderate Assistance, Max=Maximal Assistance, Total=Total Assistance, NT=Not Tested BALANCE: Good Fair+ Fair Fair- Poor NT Comments Sitting Static [] [x] [] [] [] [] Sitting Dynamic [] [x] [] [] [] []   
         
Standing Static [] [] [] [x] [] []   
Standing Dynamic [] [] [] [] [x] [] GAIT: I Mod I S SBA CGA Min Mod Max Total  NT x2 Comments:  
Level of Assistance [] [] [] [] [] [x] [] [] [] [] [] Distance 30' DME [address] [place] [address] Gait Quality Decreased B LE step length Weightbearing Status N/A I=Independent, Mod I=Modified Independent, S=Supervision, SBA=Standby Assistance, CGA=Contact Guard Assistance,  
Min=Minimal Assistance, Mod=Moderate Assistance, Max=Maximal Assistance, Total=Total Assistance, NT=Not Tested PLAN:  
FREQUENCY/DURATION: PT Plan of Care: 3 times/week for duration of hospital stay or until stated goals are met, whichever comes first. 
TREATMENT:  
 
TREATMENT:  
($$ Therapeutic Activity: 23-37 mins    ) Therapeutic Activity (15 Minutes): Therapeutic activity included Supine to Sit, Scooting, Transfer Training, Ambulation on level ground, Sitting balance  and Standing balance to improve functional Mobility, Strength and Activity tolerance. Therapeutic Exercise (15 Minutes): Therapeutic exercises noted below to improve functional activity tolerance, AROM and mobility. TREATMENT GRID: 
 Date: 
6/2/21 Date: Date: Activity/Exercise Parameters Parameters Parameters Ankle pumps 10 B    
LAQs 10 B    
marching 10 B Hip ABD/ADD 10 B AFTER TREATMENT POSITION/PRECAUTIONS: 
Alarm Activated, Chair and Needs within reach INTERDISCIPLINARY COLLABORATION: 
RN/PCT, PT/PTA and Rehab Attendant TOTAL TREATMENT DURATION: 
PT Patient Time In/Time Out Time In: 9069 Time Out: 1540 Ibeth Kaur PTA

## 2021-06-02 NOTE — PROGRESS NOTES
Occupational Therapy Note: 
 
OT treatment attempted this a.m. however pt currently DEON for procedure. Will check back later as schedule and pt's condition permits.  
 
Griselda Freiberg, OTR/L, CLT

## 2021-06-03 NOTE — PROGRESS NOTES
ACUTE OT GOALS: 
(Developed with and agreed upon by patient and/or caregiver.) 1. Patient will complete lower body bathing and dressing with min A and adaptive equipment as needed. 2. Patient will complete toileting with min A.  
3. Patient will tolerate 25 minutes of OT treatment with 1-2 rest breaks to increase activity tolerance for ADLs. 4. Patient will complete functional transfers with min A and adaptive equipment as needed. 5. Patient will complete functional activity while seated edge of bed with SBA and adaptive equipment as needed. 6. Patient will tolerate 15 minutes unsupported sitting balance with CGA in preparation for ADL performance. 7. Patient will tolerate 30 minutes BUE therapeutic activities to increase use of BUE during ADL performance.  
  
Timeframe: 7 visits OCCUPATIONAL THERAPY: Daily Note OT Treatment Day # 2 Allison Calix is a 78 y.o. female PRIMARY DIAGNOSIS: Hyperkalemia Hyperkalemia [E87.5] Procedure(s) (LRB): ENDOSCOPIC ULTRASOUND (EUS)/ 21/ 318/ RECOVERY BAY 6 (N/A) 1 Day Post-Op Payor: [employer] MEDICARE / Plan: [employer] / Product Type: Managed Care Medicare /  
ASSESSMENT:  
 
REHAB RECOMMENDATIONS: CURRENT LEVEL OF FUNCTION: 
(Most Recently Demonstrated) Recommendation to date pending progress: 
Setting:  Short-term Rehab Equipment:  To Be Determined Bathing:  Moderate Assistance Dressing:  Moderate Assistance Feeding/Grooming:  Set Up Toileting:  Maximal Assistance Functional Mobility:  Minimal Assistance x 2 x RW  
 
ASSESSMENT: 
Ms. Annabel Angelo continues to present with deficits in overall strength, activity tolerance, ADL performance, and functional mobility. Currently resting on 2L 02. Completed self-grooming tasks, including washing face and brushing teeth, with set-up. Proceeded to complete functional mobility for household distances with min A x 2 x RW (chair follow).  One seated rest break provided half-way through. Fair dynamic standing balance with constant manual support and use of RW. Forward hunched posture. Returned to room to sitting upright in chair. Will continue OT efforts as indicated. SUBJECTIVE:  
Ms. Britany Quinn states, \"I'll keep walking. \" SOCIAL HISTORY/LIVING ENVIRONMENT:  
Home Environment: Skilled nursing facility One/Two Story Residence: One story Living Alone: No 
Support Systems: Family member(s), Child(marga), Skilled nursing facility OBJECTIVE:  
 
PAIN: VITAL SIGNS: LINES/DRAINS:  
Pre Treatment: Pain Screen Pain Scale 1: Numeric (0 - 10) Pain Intensity 1: 0 Post Treatment: 0   Purewick O2 Device: None (Room air) ACTIVITIES OF DAILY LIVING: I Mod I S SBA CGA Min Mod Max Total NT Comments BASIC ADLs:             
Bathing/ Showering [] [] [] [] [] [] [] [] [] [x] Toileting [] [] [] [] [] [] [] [] [] _ Donning new gown Dressing [] [] [] [] [] [x] [] [] [] _ Feeding [] [] [] [] [] [] [] [] [] [x] Grooming [] [] [x] [] [] [] [] [] [] [] Washing face and brushing teeth Personal Device Care [] [] [] [] [] [] [] [] [] [x] Functional Mobility [] [] [] [] [] [x] [] [] [] [] 2 x RW I=Independent, Mod I=Modified Independent, S=Supervision, SBA=Standby Assistance, CGA=Contact Guard Assistance,  
Min=Minimal Assistance, Mod=Moderate Assistance, Max=Maximal Assistance, Total=Total Assistance, NT=Not Tested MOBILITY: I Mod I S SBA CGA Min Mod Max Total  NT x2 Comments:  
Supine to sit [] [] [] [] [] [] [] [] [] [x] [] Sit to supine [] [] [] [] [] [] [] [] [] [x] [] Sit to stand [] [] [] [] [] [x] [] [] [] [] [x] Bed to chair [] [] [] [] [] [x] [] [] [] [] [x] X RW  
I=Independent, Mod I=Modified Independent, S=Supervision, SBA=Standby Assistance, CGA=Contact Guard Assistance,  
Min=Minimal Assistance, Mod=Moderate Assistance, Max=Maximal Assistance, Total=Total Assistance, NT=Not Tested BALANCE: Good Fair+ Fair Fair- Poor NT Comments Sitting Static [] [] [x] [] [] [] Sitting Dynamic [] [] [] [] [] [x] Standing Static [] [] [x] [] [] []   
Standing Dynamic [] [] [x] [x] [] [] Constant manual support and use of RW PLAN:  
FREQUENCY/DURATION: OT Plan of Care: 3 times/week for duration of hospital stay or until stated goals are met, whichever comes first. 
 
TREATMENT:  
TREATMENT:  
($$ Self Care/Home Management: 8-22 mins$$ Neuromuscular Re-Education: 8-22 mins   ) Self Care (10 Minutes): Self care including Upper Body Dressing and Grooming to increase independence and decrease level of assistance required. Neuromuscular Re-education (13 Minutes): Neuromuscular Re-education included Balance Training, Coordination training, Postural training, Sitting balance training and Standing balance training to improve Balance, Coordination and Postural Control. TREATMENT GRID: 
N/A 
 
AFTER TREATMENT POSITION/PRECAUTIONS: 
Chair, Needs within reach, RN notified and PTA at bedside INTERDISCIPLINARY COLLABORATION: 
RN/PCT, PT/PTA and OT/PERALTA TOTAL TREATMENT DURATION: 
OT Patient Time In/Time Out Time In: 8568 Time Out: 1058 Faby Callaway OT

## 2021-06-03 NOTE — PROGRESS NOTES
Patient will return to [facility] @ d/c.  
Per previous note, patient will need a precert. Sent referral to [facility] and [facility]. Will update. Update 15:00: Shayy Tran has started precert

## 2021-06-03 NOTE — ROUTINE PROCESS
Verbal bedside report given to Renetta Sanchez, oncoming RN. Patient's situation, background, assessment and recommendations provided. Opportunity for questions provided. Oncoming RN assumed care of patient.

## 2021-06-03 NOTE — PROGRESS NOTES
ACUTE PHYSICAL THERAPY GOALS: 
(Developed with and agreed upon by patient and/or caregiver. ) LTG: 
(1.)Ms. Elaina Maurer will move from supine to sit and sit to supine , scoot up and down and roll side to side in bed with MODIFIED INDEPENDENCE within 7 treatment day(s). (2.)Ms. Elaina Maurer will transfer from bed to chair and chair to bed with STAND BY ASSIST using the least restrictive device within 7 treatment day(s). (3.)Ms. Elaina Maurer will ambulate with CONTACT GUARD ASSIST for 50 feet with the least restrictive device within 7 treatment day(s). (4.)Ms. Elaina Maurer will participate in therapeutic activity/exercises x 25 minutes for increased strength within 7 treatment days. PHYSICAL THERAPY: Daily Note and AM Treatment Day # 4 Anita Melo is a 78 y.o. female PRIMARY DIAGNOSIS: Hyperkalemia Hyperkalemia [E87.5] Procedure(s) (LRB): ENDOSCOPIC ULTRASOUND (EUS)/ 21/ 318/ RECOVERY BAY 6 (N/A) 1 Day Post-Op ASSESSMENT:  
 
REHAB RECOMMENDATIONS: CURRENT LEVEL OF FUNCTION: 
(Most Recently Demonstrated) Recommendation to date pending progress: 
Setting:  Short-term Rehab Equipment:  To Be Determined Bed Mobility: 
Pauline Jenkins Sit to Stand:  Minimal Assistance x 2 Transfers:  Minimal Assistance Gait/Mobility:  Minimal Assistance ASSESSMENT: 
Ms. Elaina Maurer was sitting up in chair on arrival. She then ambulated down the hallway for 30' plus additional 20' with RW and CGA/min A for balance. Exercises performed while sitting up in chair per chart below. SUBJECTIVE:  
Ms. Elaina Maurer states, \"I'm ready\" SOCIAL HISTORY/ LIVING ENVIRONMENT: see eval 
Home Environment: Skilled nursing facility One/Two Story Residence: One story Living Alone: No 
Support Systems: Family member(s), Child(marga), Skilled nursing facility OBJECTIVE:  
 
PAIN: VITAL SIGNS: LINES/DRAINS:  
Pre Treatment:   none Post Treatment: none   Purewick O2 Device: None (Room air) MOBILITY: I Mod I S SBA CGA Min Mod Max Total  NT x2 Comments:  
Bed Mobility Rolling [] [] [] [] [] [] [] [] [] [] [] Supine to Sit [] [] [] [] [] [] [] [] [] [] [] Scooting [] [] [] [] [] [] [] [] [] [] [] Sit to Supine [] [] [] [] [] [] [] [] [] [] []   
Transfers Sit to Stand [] [] [] [] [] [x] [] [] [] [] [x] Bed to Chair [] [] [] [] [] [x] [] [] [] [] []   
Stand to Sit [] [] [] [] [] [x] [] [] [] [] [] I=Independent, Mod I=Modified Independent, S=Supervision, SBA=Standby Assistance, CGA=Contact Guard Assistance,  
Min=Minimal Assistance, Mod=Moderate Assistance, Max=Maximal Assistance, Total=Total Assistance, NT=Not Tested BALANCE: Good Fair+ Fair Fair- Poor NT Comments Sitting Static [] [x] [] [] [] [] Sitting Dynamic [] [x] [] [] [] []   
         
Standing Static [] [] [x] [] [] []   
Standing Dynamic [] [] [x] [] [] [] GAIT: I Mod I S SBA CGA Min Mod Max Total  NT x2 Comments:  
Level of Assistance [] [] [] [] [x] [x] [] [] [] [] [] Distance 30' plus 20' DME Lyly Cowper Gait Quality Decreased B LE step length Weightbearing Status N/A I=Independent, Mod I=Modified Independent, S=Supervision, SBA=Standby Assistance, CGA=Contact Guard Assistance,  
Min=Minimal Assistance, Mod=Moderate Assistance, Max=Maximal Assistance, Total=Total Assistance, NT=Not Tested PLAN:  
FREQUENCY/DURATION: PT Plan of Care: 3 times/week for duration of hospital stay or until stated goals are met, whichever comes first. 
TREATMENT:  
 
TREATMENT:  
($$ Therapeutic Activity: 8-22 mins 
$$ Therapeutic Exercises: 8-22 mins    ) Therapeutic Activity (15 Minutes): Therapeutic activity included Scooting, Transfer Training, Ambulation on level ground, Sitting balance  and Standing balance to improve functional Mobility, Strength and Activity tolerance. Therapeutic Exercise (10 Minutes): Therapeutic exercises noted below to improve functional activity tolerance, AROM and mobility.   
 
TREATMENT GRID: 
 Date: 
6/2/21 Date: 
6/3/21 Date: Activity/Exercise Parameters Parameters Parameters Ankle pumps 10 B 10 B   
LAQs 10 B 10 B   
marching 10 B 10 B Hip ABD/ADD 10 B 10 B Quad sets  10 B AFTER TREATMENT POSITION/PRECAUTIONS: 
Alarm Activated, Chair and Needs within reach INTERDISCIPLINARY COLLABORATION: 
RN/PCT and PT/PTA TOTAL TREATMENT DURATION: 
PT Patient Time In/Time Out Time In: 5571 Time Out: 1110 Christianne Paulson PTA

## 2021-06-03 NOTE — PROGRESS NOTES
Hospitalist Progress Note Admit Date:  2021  6:11 PM  
Name:  Babar Wall Age:  78 y.o. 
:  1940 MRN:  [id number] PCP:  Yady Pyle MD 
Presenting Complaint: Other Initial Admission Diagnosis: Hyperkalemia [E87.5] Assessment and Plan:  
 
Hospital Problems as of 6/3/2021 Date Reviewed: 2021 Codes Class Noted - Resolved POA Myoclonus (Chronic) ICD-10-CM: G25.3 ICD-9-CM: 333.2  2021 - Present Yes History of COVID-19 (Chronic) ICD-10-CM: Z86.16 
ICD-9-CM: V12.09  2021 - Present Yes Diastolic CHF, chronic (HCC) ICD-10-CM: I50.32 
ICD-9-CM: 428.32, 428.0  2021 - Present Yes Pulmonary HTN ([facility] [address] ICD-10-CM: I27.20 ICD-9-CM: 416.8  2021 - Present Yes * (Principal) Hyperkalemia ICD-10-CM: E87.5 ICD-9-CM: 276.7  2021 - Present Unknown UTI (urinary tract infection) ICD-10-CM: N39.0 ICD-9-CM: 599.0  2021 - Present Yes Chronic hypoxemic respiratory failure [facility]) ICD-10-CM: J96.11 
ICD-9-CM: 518.83, 799.02  2021 - Present Yes Abnormal LFTs ICD-10-CM: R94.5 ICD-9-CM: 790.6  2021 - Present Yes Anemia ICD-10-CM: D64.9 ICD-9-CM: 285.9  2019 - Present Yes Uncontrolled type 2 diabetes mellitus with hyperglycemia (HCC) (Chronic) ICD-10-CM: E11.65 ICD-9-CM: 250.02  6/15/2015 - Present Yes Essential hypertension (Chronic) ICD-10-CM: I10 
ICD-9-CM: 401.9  6/15/2015 - Present Yes Chronic kidney disease, stage III (moderate) (HCC) (Chronic) ICD-10-CM: N18.30 ICD-9-CM: 585.3  6/15/2015 - Present Yes Plan: # HyperK - ARB held, Cr at baseline.  
 - Possible RTA but CO2 typically lower. Lasix added, appreciate Nephro input. Awaiting other labs to calculate TTKG. # Myclonus/tremors - Seen by Neuro and felt related to gabapentin (especially in setting of CKD), so was held. Now resolved. 
  
# Biliary ductal dilation - EUS 6/3 -- probably benign post-CCY CBD dilation; main PD dilation, main duct IPMN? Con't outpatient GI follow up/suveillance per their discretion.   
  
# VRE UTI 
            - Changed vanc to Zyvox 6/3.  
  
# CKD3 
            - At baseline. ARB off due to hyperK. 
  
# Generalized weakness/debility - Chronic resident of [facility] 
  
# Chronic anemia 
            - No bleeding, Hb stable. Other listed chronic conditions stable, continue current management. Discharge planning: Back to facility, pre-cert started today as medically stable. Diet:  DIET ADULT 
DVT ppx: SCDs Hospital Course:  
Mrs. Martín Armendariz is a. 79 y/o AAF with a h/o HTN, HFpEF, DM2, CKD3 with prior short term dialysis, COPD on 2 L NC with chronic hypoxic respiratory failure, COVID-19 12/2020 s/p trach now decanulated, pHTN, who was admitted to our service on 5/30 with tremors. She currently is a longterm resident at [facility]. Since her prior COVID illness 12/2020 she has had numerous complications and admissions. She was discharged to [facility] on 5-12-21 after a stay for encephalopathy, Klebsiella UTI, workup of elevated LFTs, volume overload, hyperkalemia. She is pending outpatient EUS per GI on 6-2-21 for biliary ductal dilation. Her losartan was stopped. She was resumed on oral lasix. She was at her baseline health but was noted to have increased tremors and extremity jerking. She did have some generalized weakness and is essentially wheelchair bound since COVID with extremity jerking/ tremors. Upon visiting on day of admission, daughter noted she seemed worse and had her sent to the ED. K was 6.5 and UA with UTI. She has received insulin/dextrose/ IVF bolus. EKG showed mildly peaked T waves. ARB held, Lokelma was given.  Patient was evaluated by Neuro and gabapentin held. Myoclonus resolved. K intermittent elevated still.  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 Nephrology was consulted for recalcitrant hyperkalemia. 24hr Events/Subjective:  
6/3: Doing well, K remains normal today. Stable O2 needs. She feels ok, tired, no chest pain or SOB otherwise. No other complaints Objective:  
 
Patient Vitals for the past 24 hrs: 
 Temp Pulse Resp BP SpO2  
06/03/21 0801 97.1 °F (36.2 °C) 86 18 (!) 156/78 97 % 06/03/21 0539 97.9 °F (36.6 °C) 71 20 (!) 175/89 100 % 06/03/21 0046 98.1 °F (36.7 °C) 77 20 (!) 159/73 100 % 06/02/21 2311    (!) 163/74   
06/02/21 2122    (!) 167/114   
06/02/21 1941 97.6 °F (36.4 °C) 70 15 (!) 181/84 99 % 06/02/21 1740 97.7 °F (36.5 °C) 71 20 (!) 173/83 97 % 06/02/21 1345 97.4 °F (36.3 °C) 68 20 (!) 186/80   
06/02/21 1208  71 20 (!) 167/74 100 % 06/02/21 1157  72 18 (!) 157/74 100 % 06/02/21 1147  79 20 (!) 147/68 100 % 06/02/21 1137  79  (!) 105/53 99 % 06/02/21 1132  67  (!) 97/49 99 % Oxygen Therapy O2 Sat (%): 97 % (06/03/21 0801) Pulse via Oximetry: 71 beats per minute (06/02/21 1208) O2 Device: None (Room air) (06/03/21 0801) Skin Assessment: Clean, dry, & intact (06/02/21 0430) Skin Protection for O2 Device: No (06/02/21 0430) O2 Flow Rate (L/min): 4 l/min (06/03/21 0801) Estimated body mass index is 21.3 kg/m² as calculated from the following: 
  Height as of this encounter: 5' 5\" (1.651 m). Weight as of this encounter: 58.1 kg (128 lb). Intake/Output Summary (Last 24 hours) at 6/3/2021 1131 Last data filed at 6/3/2021 1721 Gross per 24 hour Intake 390 ml Output 1000 ml Net -610 ml  
   
*Note that automatically entered I/Os may not be accurate; dependent on patient compliance with collection and accurate  by techs. General:    Well nourished. No overt distress.  Chronically ill appearing. CV:   RRR. No m/r/g. No edema. No JVD Lungs:   CTAB. No wheezing, rhonchi, or rales. Unlabored Abdomen:   Soft, nontender, nondistended. Extremities: Warm and dry. No cyanosis. B/l stasis dermatitis. Skin:     No rashes. Normal coloration Neuro:  No gross focal deficits. I have reviewed all labs, meds, and other studies shown below: 
Last 24hr Labs: 
Recent Results (from the past 24 hour(s)) GLUCOSE, POC Collection Time: 06/02/21  4:01 PM  
Result Value Ref Range Glucose (POC) 255 (H) 65 - 100 mg/dL Performed by Kal Arrieta GLUCOSE, POC Collection Time: 06/02/21  8:46 PM  
Result Value Ref Range Glucose (POC) 131 (H) 65 - 100 mg/dL Performed by Aster Elliott Collection Time: 06/03/21  5:04 AM  
Result Value Ref Range Vancomycin, random 7.5 UG/ML  
METABOLIC PANEL, BASIC Collection Time: 06/03/21  5:04 AM  
Result Value Ref Range Sodium 145 136 - 145 mmol/L Potassium 4.7 3.5 - 5.1 mmol/L Chloride 112 (H) 98 - 107 mmol/L  
 CO2 31 21 - 32 mmol/L Anion gap 2 (L) 7 - 16 mmol/L Glucose 87 65 - 100 mg/dL BUN 29 (H) 8 - 23 MG/DL Creatinine 1.33 (H) 0.6 - 1.0 MG/DL  
 GFR est AA 49 (L) >60 ml/min/1.73m2 GFR est non-AA 41 (L) >60 ml/min/1.73m2 Calcium 8.4 8.3 - 10.4 MG/DL  
GLUCOSE, POC Collection Time: 06/03/21  7:07 AM  
Result Value Ref Range Glucose (POC) 107 (H) 65 - 100 mg/dL Performed by Jonelle Corrales GLUCOSE, POC Collection Time: 06/03/21 10:58 AM  
Result Value Ref Range Glucose (POC) 268 (H) 65 - 100 mg/dL Performed by Jonelle Corrales All Micro Results Procedure Component Value Units Date/Time COVID-19 RAPID TEST [[id number]] Order Status: Sent CULTURE, URINE [[id number]]  (Abnormal)  (Susceptibility) Collected: 05/30/21 1910 Order Status: Completed Specimen: Cath Urine Updated: 06/03/21 0700 Special Requests: NO SPECIAL REQUESTS Culture result:    
  >100,000 COLONIES/mL * VANCOMYCIN RESISTANT ENTEROCOCCUS FAECIUM *  
     
      
  50,000-100,000 COLONIES/mL NORMAL SKIN REDD ISOLATED  
     
      
  VRE CALLED AND CORRECTLY REPEATED BY: 
Dolly Kat RN @0659 6.3.21 SC 
  
 CULTURE, BLOOD [[id number]] Collected: 05/30/21 2025 Order Status: Completed Specimen: Blood Updated: 06/03/21 5349 Special Requests: --     
  RIGHT 
FOREARM Culture result: NO GROWTH 4 DAYS     
 CULTURE, BLOOD [[id number]] Collected: 05/30/21 2114 Order Status: Completed Specimen: Blood Updated: 06/03/21 9241 Special Requests: --     
  LEFT 
HAND Culture result: NO GROWTH 4 DAYS     
  
 
 
SARS-CoV-2 Lab Results \"Novel Coronavirus\" Test: No results found for: COV2NT \"Emergent Disease\" Test: No results found for: EDPR \"SARS-COV-2\" Test: No results found for: XGCOVT Rapid Test:  
Lab Results Component Value Date/Time COVR Not detected 05/10/2021 04:30 PM  
 
  
 
 
Current Meds: 
Current Facility-Administered Medications Medication Dose Route Frequency  linezolid (ZYVOX) tablet 600 mg  600 mg Oral Q12H  furosemide (LASIX) tablet 40 mg  40 mg Oral DAILY  insulin glargine (LANTUS) injection 3 Units  3 Units SubCUTAneous DAILY  labetaloL (NORMODYNE;TRANDATE) injection 10 mg  10 mg IntraVENous Q4H PRN  
 alcohol 62% (NOZIN) nasal  1 Ampule  1 Ampule Topical Q12H  
 sodium chloride (NS) flush 5-40 mL  5-40 mL IntraVENous Q8H  
 sodium chloride (NS) flush 5-40 mL  5-40 mL IntraVENous PRN  
 ascorbic acid (vitamin C) (VITAMIN C) tablet 500 mg  500 mg Oral EVERY OTHER DAY  aspirin delayed-release tablet 81 mg  81 mg Oral DAILY  clopidogreL (PLAVIX) tablet 75 mg  75 mg Oral DAILY  ferrous sulfate tablet 325 mg  1 Tablet Oral EVERY OTHER DAY  metoprolol succinate (TOPROL-XL) XL tablet 12.5 mg  12.5 mg Oral DAILY  pantoprazole (PROTONIX) tablet 40 mg  40 mg Oral ACB  sodium chloride (NS) flush 5-40 mL 5-40 mL IntraVENous Q8H  
 sodium chloride (NS) flush 5-40 mL  5-40 mL IntraVENous PRN  
 acetaminophen (TYLENOL) tablet 650 mg  650 mg Oral Q6H PRN Or  
 acetaminophen (TYLENOL) suppository 650 mg  650 mg Rectal Q6H PRN  polyethylene glycol (MIRALAX) packet 17 g  17 g Oral DAILY PRN  
 ondansetron (ZOFRAN) injection 4 mg  4 mg IntraVENous Q6H PRN  
 insulin lispro (HUMALOG) injection   SubCUTAneous AC&HS  hydrALAZINE (APRESOLINE) 20 mg/mL injection 10 mg  10 mg IntraVENous Q6H PRN Other Studies: No results found for this visit on 05/30/21. No results found.  
 
Signed: 
Luke Ng MD

## 2021-06-03 NOTE — PROGRESS NOTES
[facility] Subjective: Hyperkalemia, CKD3b  No new complaints Review of Systems -  
General ROS: negative for - fever, chills Respiratory ROS: no SOB, cough, WHITE Cardiovascular ROS: no CP, palpitations Gastrointestinal ROS: no N&V, abdominal pain, diarrhea Genito-Urinary ROS: no difficulty voiding, dysuria Neurological ROS: no seizures, focal weekness Objective: 
 
Vitals:  
 06/02/21 2311 06/03/21 3586 06/03/21 0539 06/03/21 0801 BP: (!) 163/74 (!) 159/73 (!) 175/89 (!) [id number] Pulse:  77 71 86 Resp:  20 20 18 Temp:  98.1 °F (36.7 °C) 97.9 °F (36.6 °C) 97.1 °F (36.2 °C) SpO2:  100% 100% 97% Weight:   58.1 kg (128 lb) Height:      
 
 
PE 
Gen: in no acute distress CV:reg rate Chest:clear Abd: soft Ext/Access: no edema Windy [facility] Recent Labs  
  06/01/21 
0416 WBC 7.1 HGB 8.4* HCT 26.8*  
 Recent Labs  
  06/03/21 
0504 06/02/21 
0532 06/01/21  06/01/21 
0416  143  --  145  
K 4.7 4.9 5.5* 5.4*  
* 110*  --  112* CO2 31 29  --  31  
GLU 87 106*  --  114* BUN 29* 38*  --  41* CREA 1.33* 1.62*  --  1.59* CA 8.4 8.6  --  8.8 Radiology A/P:  
Patient Active Problem List  
Diagnosis Code  Diabetes mellitus with hyperosmolarity without hyperglycemic hyperosmolar nonketotic coma ([facility] [address] E11.00  Coronary artery disease involving coronary bypass graft of native heart without angina pectoris I25.810  
 Uncontrolled type 2 diabetes mellitus with hyperglycemia (HCC) E11.65  
 Essential hypertension I10  
 HLD (hyperlipidemia) E78.5  Chronic kidney disease, stage III (moderate) (HCC) N18.30  
 Osteopenia M85.80  Vitamin D deficiency E55.9  Gastroesophageal reflux disease without esophagitis K21.9  Other allergic rhinitis J30.89  Peripheral neuropathy G62.9  
 Primary osteoarthritis involving multiple joints M89.49  
 Insomnia G47.00  RLS (restless legs syndrome) L49.99  
 Metabolic encephalopathy G93.41  
 Anemia D64.9  Generalized weakness R53.1  Decreased oral intake R63.8  Acute on chronic renal failure (HCC) N17.9, N18.9  Noncompliance Z91.19  
 Hypoalbuminemia due to protein-calorie malnutrition ([facility] [address] E88.09, E46  
 COVID-19 U07.1  Acute respiratory distress syndrome (ARDS) due to severe acute respiratory syndrome coronavirus 2 (SARS-CoV-2) ([facility]) U07.1, J80  Acute hypoxemic respiratory failure ([facility]) J96.01  
 Cardiac arrest ([facility]) I46.9  CHF exacerbation ([facility]) I50.9  Acute diastolic CHF (congestive heart failure) ([facility]) B12.08  
 Diastolic CHF, chronic ([facility]) I50.32  
 Pulmonary HTN ([facility]) I27.20  Hyperkalemia E87.5  UTI (urinary tract infection) N39.0  Chronic hypoxemic respiratory failure ([facility]) J96.11  
 Hypercapnemia R06.89  
 Abnormal LFTs R94.5  Constipation K59.00  Myoclonus G25.3  History of COVID-19 Z86.16 Hyperkalemia - remaining under control with K restricted Shadi Leas has been stopped. HTN - on lasix CKD3b - cam Varela MD

## 2021-06-04 NOTE — ROUTINE PROCESS
Bedside and Verbal shift change report given to Royal 62 (oncoming nurse) by self (offgoing nurse). Report included the following information SBAR, Kardex, MAR and Recent Results.

## 2021-06-04 NOTE — ROUTINE PROCESS
Bedside and Verbal shift change report given to self (oncoming nurse) by Senait Pollock RN (offgoing nurse). Report included the following information SBAR, Kardex, MAR and Recent Results.

## 2021-06-04 NOTE — ROUTINE PROCESS
Bedside and Verbal shift change report given to self (oncoming nurse) by Marie Moran RN (offgoing nurse). Report included the following information SBAR, Kardex, MAR and Recent Results.

## 2021-06-04 NOTE — PROGRESS NOTES
Hospitalist Progress Note Admit Date:  2021  6:11 PM  
Name:  Jose Michael Age:  78 y.o. 
:  1940 MRN:  [id number] PCP:  Selene Porras MD 
Presenting Complaint: Other Initial Admission Diagnosis: Hyperkalemia [E87.5] Assessment and Plan:  
 
Hospital Problems as of 2021 Date Reviewed: 2021 Codes Class Noted - Resolved POA Myoclonus (Chronic) ICD-10-CM: G25.3 ICD-9-CM: 333.2  2021 - Present Yes History of COVID-19 (Chronic) ICD-10-CM: Z86.16 
ICD-9-CM: V12.09  2021 - Present Yes Diastolic CHF, chronic (HCC) ICD-10-CM: I50.32 
ICD-9-CM: 428.32, 428.0  2021 - Present Yes Pulmonary HTN ([facility] [address] ICD-10-CM: I27.20 ICD-9-CM: 416.8  2021 - Present Yes * (Principal) Hyperkalemia ICD-10-CM: E87.5 ICD-9-CM: 276.7  2021 - Present Unknown UTI (urinary tract infection) ICD-10-CM: N39.0 ICD-9-CM: 599.0  2021 - Present Yes Chronic hypoxemic respiratory failure [facility]) ICD-10-CM: J96.11 
ICD-9-CM: 518.83, 799.02  2021 - Present Yes Abnormal LFTs ICD-10-CM: R94.5 ICD-9-CM: 790.6  2021 - Present Yes Anemia ICD-10-CM: D64.9 ICD-9-CM: 285.9  2019 - Present Yes Uncontrolled type 2 diabetes mellitus with hyperglycemia (HCC) (Chronic) ICD-10-CM: E11.65 ICD-9-CM: 250.02  6/15/2015 - Present Yes Essential hypertension (Chronic) ICD-10-CM: I10 
ICD-9-CM: 401.9  6/15/2015 - Present Yes Chronic kidney disease, stage III (moderate) (HCC) (Chronic) ICD-10-CM: N18.30 ICD-9-CM: 585.3  6/15/2015 - Present Yes Plan: # HyperK 
            - Resolved. ARB held, Cr at baseline.  
            - Possible RTA but CO2 typically lower. Lasix added, appreciate Nephro input. K normal now, off Lokelma.  TTKG results suggest hypoaldo but d/w Nephro and likely unreliable given her CKD, so no further work up recommended at this point. 
  
# Myclonus/tremors 
            - Seen by Neuro and felt related to gabapentin (especially in setting of CKD), so was held. Now resolved. 
  
# Biliary ductal dilation 
            - EUS 6/3 -- probably benign post-CCY CBD dilation; main PD dilation, main duct IPMN? Con't outpatient GI follow up/suveillance per their discretion.   
  
# VRE UTI 
            - Changed vanc to Zyvox 6/3.  
  
# CKD3 
            - At baseline. ARB off due to hyperK. 
  
# Generalized weakness/debility             - Long-term resident of [facility] 
  
# Chronic anemia             - No bleeding, Hb stable. Other listed chronic conditions stable, continue current management. Discharge planning: Awaiting pre-cert for return to [facility], otherwise medically stable. Diet:  DIET ADULT 
DVT ppx: SCD Hospital Course:  
Mrs. Taylor is a. 79 y/o AAF with a h/o HTN, HFpEF, DM2, CKD3 with prior short term dialysis, COPD on 2 L NC with chronic hypoxic respiratory failure, COVID-19 12/2020 s/p trach now decanulated, pHTN, who was admitted to our service on 5/30 with tremors. She currently is a longterm resident at [facility]. Since her prior COVID illness 12/2020 she has had numerous complications and admissions. She was discharged to [facility] on 5-12-21 after a stay for encephalopathy, Klebsiella UTI, workup of elevated LFTs, volume overload, hyperkalemia. She is pending outpatient EUS per GI on 6-2-21 for biliary ductal dilation. Her losartan was stopped. She was resumed on oral lasix. She was at her baseline health but was noted to have increased tremors and extremity jerking. She did have some generalized weakness and is essentially wheelchair bound since COVID with extremity jerking/ tremors. Upon visiting on day of admission, daughter noted she seemed worse and had her sent to the ED. K was 6.5 and UA with UTI. She has received insulin/dextrose/ IVF bolus. EKG showed mildly peaked T waves. ARB held, Lokelma was given.  Patient was evaluated by Neuro and gabapentin held.  Myoclonus resolved. K intermittent elevated still. 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 Nephrology was consulted for recalcitrant hyperkalemia. 24hr Events/Subjective:  
6/4: No chest pain or SOB. K high normal today, 4.9. Appetite is ok. Awaiting pre-cert for return to [facility]. ROS neg otherwise. No other complaints Objective:  
 
Patient Vitals for the past 24 hrs: 
 Temp Pulse Resp BP SpO2  
06/04/21 0849  69  (!) 159/87   
06/04/21 0750 97.3 °F (36.3 °C) 75 16 (!) 163/80 100 % 06/04/21 0543 97.6 °F (36.4 °C) 63 18 (!) 154/74 100 % 06/04/21 0236    (!) 155/64   
06/04/21 0044 97.5 °F (36.4 °C) 68 18 (!) 167/68 100 % 06/03/21 2000 97.8 °F (36.6 °C) 65 18 (!) 145/73 100 % 06/03/21 1810  65  (!) 152/72   
06/03/21 1645 97.5 °F (36.4 °C) 72 18 (!) 176/88 100 % 06/03/21 1134 97.6 °F (36.4 °C) 75 18 (!) 160/90 100 % Oxygen Therapy O2 Sat (%): 100 % (06/04/21 0750) Pulse via Oximetry: 71 beats per minute (06/02/21 1208) O2 Device: Nasal cannula (06/04/21 0750) Skin Assessment: Clean, dry, & intact (06/03/21 2043) Skin Protection for O2 Device: No (06/02/21 0430) O2 Flow Rate (L/min): 2.5 l/min (06/04/21 0750) Estimated body mass index is 25.66 kg/m² as calculated from the following: 
  Height as of this encounter: 5' 5\" (1.651 m). Weight as of this encounter: 69.9 kg (154 lb 3.2 oz). Intake/Output Summary (Last 24 hours) at 6/4/2021 1133 Last data filed at 6/4/2021 0118 Gross per 24 hour Intake 960 ml Output 1375 ml Net -415 ml *Note that automatically entered I/Os may not be accurate; dependent on patient compliance with collection and accurate  by techs. General:    Well nourished.   No overt distress. Pleasant and cooperative. Chronically ill appearing. CV:   RRR. No m/r/g. No edema. No JVD Lungs:   CTAB. No wheezing, rhonchi, or rales. Unlabored Abdomen:   Soft, nontender, nondistended. Extremities: Warm and dry. No cyanosis Skin:     No rashes. Normal coloration Neuro:  No gross focal deficits. I have reviewed all labs, meds, and other studies shown below: 
Last 24hr Labs: 
Recent Results (from the past 24 hour(s)) COVID-19 RAPID TEST Collection Time: 06/03/21  2:55 PM  
Result Value Ref Range Specimen source Nasopharyngeal    
 COVID-19 rapid test Not detected NOTD    
GLUCOSE, POC Collection Time: 06/03/21  3:57 PM  
Result Value Ref Range Glucose (POC) 92 65 - 100 mg/dL Performed by Darren Troy GLUCOSE, POC Collection Time: 06/03/21  8:20 PM  
Result Value Ref Range Glucose (POC) 162 (H) 65 - 100 mg/dL Performed by JenniferCHANDLER RENAL FUNCTION PANEL Collection Time: 06/04/21  4:52 AM  
Result Value Ref Range Sodium 142 136 - 145 mmol/L Potassium 4.9 3.5 - 5.1 mmol/L Chloride 108 (H) 98 - 107 mmol/L  
 CO2 30 21 - 32 mmol/L Anion gap 4 (L) 7 - 16 mmol/L Glucose 129 (H) 65 - 100 mg/dL BUN 32 (H) 8 - 23 MG/DL Creatinine 1.70 (H) 0.6 - 1.0 MG/DL  
 GFR est AA 37 (L) >60 ml/min/1.73m2 GFR est non-AA 31 (L) >60 ml/min/1.73m2 Calcium 9.1 8.3 - 10.4 MG/DL Phosphorus 4.0 (H) 2.3 - 3.7 MG/DL Albumin 3.1 (L) 3.2 - 4.6 g/dL OSMOLALITY, UR Collection Time: 06/04/21  6:24 AM  
Result Value Ref Range Osmolality,urine 405 50 - 1,400 MOSM/kg H2O  
POTASSIUM, UR, RANDOM Collection Time: 06/04/21  6:24 AM  
Result Value Ref Range Potassium urine, random 27 MMOL/L  
GLUCOSE, POC Collection Time: 06/04/21  7:07 AM  
Result Value Ref Range Glucose (POC) 134 (H) 65 - 100 mg/dL Performed by Darren Troy GLUCOSE, POC Collection Time: 06/04/21 10:49 AM  
Result Value Ref Range  Glucose (POC) 240 (H) 65 - 100 mg/dL Performed by Rizwan Garcia All Micro Results Procedure Component Value Units Date/Time CULTURE, BLOOD [[id number]] Collected: 05/30/21 2025 Order Status: Completed Specimen: Blood Updated: 06/04/21 0901 Special Requests: --     
  RIGHT 
FOREARM Culture result: NO GROWTH 5 DAYS     
 CULTURE, BLOOD [[id number]] Collected: 05/30/21 2114 Order Status: Completed Specimen: Blood Updated: 06/04/21 4847 Special Requests: --     
  LEFT 
HAND Culture result: NO GROWTH 5 DAYS     
 COVID-19 RAPID TEST [[id number]] Collected: 06/03/21 1455 Order Status: Completed Specimen: Nasopharyngeal Updated: 06/03/21 1631 Specimen source Nasopharyngeal     
  COVID-19 rapid test Not detected Comment:     
The specimen is NEGATIVE for SARS-CoV-2, the novel coronavirus associated with COVID-19. A negative result does not rule out COVID-19. This test has been authorized by the FDA under an Emergency Use Authorization (EUA) for use by authorized laboratories. Fact sheet for Healthcare Providers: ConventionUpdate.co.nz Fact sheet for Patients: ConventionUpdate.co.nz Methodology: Isothermal Nucleic Acid Amplification CULTURE, URINE [[id number]]  (Abnormal)  (Susceptibility) Collected: 05/30/21 1910 Order Status: Completed Specimen: Cath Urine Updated: 06/03/21 0700 Special Requests: NO SPECIAL REQUESTS Culture result:    
  >100,000 COLONIES/mL * VANCOMYCIN RESISTANT ENTEROCOCCUS FAECIUM *  
     
      
  50,000-100,000 COLONIES/mL NORMAL SKIN REDD ISOLATED  
     
      
  VRE CALLED AND CORRECTLY REPEATED BY: 
Jodee Linton RN @4636 6.3.21 SC 
  
  
 
 
SARS-CoV-2 Lab Results \"Novel Coronavirus\" Test: No results found for: COV2NT \"Emergent Disease\" Test: No results found for: EDPR \"SARS-COV-2\" Test: No results found for: XGCOVT Rapid Test:  
Lab Results Component Value Date/Time COVR Not detected 06/03/2021 02:55 PM  
 
  
 
 
Current Meds: 
Current Facility-Administered Medications Medication Dose Route Frequency  rOPINIRole (REQUIP) tablet 0.5 mg  0.5 mg Oral TID  linezolid (ZYVOX) tablet 600 mg  600 mg Oral Q12H  lip protectant (BLISTEX) ointment 1 Each  1 Each Topical PRN  
 furosemide (LASIX) tablet 40 mg  40 mg Oral DAILY  labetaloL (NORMODYNE;TRANDATE) injection 10 mg  10 mg IntraVENous Q4H PRN  
 alcohol 62% (NOZIN) nasal  1 Ampule  1 Ampule Topical Q12H  
 sodium chloride (NS) flush 5-40 mL  5-40 mL IntraVENous Q8H  
 sodium chloride (NS) flush 5-40 mL  5-40 mL IntraVENous PRN  
 ascorbic acid (vitamin C) (VITAMIN C) tablet 500 mg  500 mg Oral EVERY OTHER DAY  aspirin delayed-release tablet 81 mg  81 mg Oral DAILY  clopidogreL (PLAVIX) tablet 75 mg  75 mg Oral DAILY  ferrous sulfate tablet 325 mg  1 Tablet Oral EVERY OTHER DAY  metoprolol succinate (TOPROL-XL) XL tablet 12.5 mg  12.5 mg Oral DAILY  pantoprazole (PROTONIX) tablet 40 mg  40 mg Oral ACB  sodium chloride (NS) flush 5-40 mL  5-40 mL IntraVENous Q8H  
 sodium chloride (NS) flush 5-40 mL  5-40 mL IntraVENous PRN  
 acetaminophen (TYLENOL) tablet 650 mg  650 mg Oral Q6H PRN Or  
 acetaminophen (TYLENOL) suppository 650 mg  650 mg Rectal Q6H PRN  polyethylene glycol (MIRALAX) packet 17 g  17 g Oral DAILY PRN  
 ondansetron (ZOFRAN) injection 4 mg  4 mg IntraVENous Q6H PRN  
 insulin lispro (HUMALOG) injection   SubCUTAneous AC&HS  hydrALAZINE (APRESOLINE) 20 mg/mL injection 10 mg  10 mg IntraVENous Q6H PRN Other Studies: No results found for this visit on 05/30/21. No results found.  
 
Signed: 
Dejuan Denny MD

## 2021-06-04 NOTE — PROGRESS NOTES
MSN CM:  Patient is awaiting insurance auth. When insurance approves patient will return to [facility]. Case Management will continue to follow.

## 2021-06-04 NOTE — PROGRESS NOTES
ACUTE OT GOALS: 
(Developed with and agreed upon by patient and/or caregiver.) 1. Patient will complete lower body bathing and dressing with min A and adaptive equipment as needed. 2. Patient will complete toileting with min A.  
3. Patient will tolerate 25 minutes of OT treatment with 1-2 rest breaks to increase activity tolerance for ADLs. 4. Patient will complete functional transfers with min A and adaptive equipment as needed. 5. Patient will complete functional activity while seated edge of bed with SBA and adaptive equipment as needed. 6. Patient will tolerate 15 minutes unsupported sitting balance with CGA in preparation for ADL performance. 7. Patient will tolerate 30 minutes BUE therapeutic activities to increase use of BUE during ADL performance.  
  
Timeframe: 7 visits OCCUPATIONAL THERAPY: Daily Note OT Treatment Day # 3 Vee Kern is a 78 y.o. female PRIMARY DIAGNOSIS: Hyperkalemia Hyperkalemia [E87.5] Procedure(s) (LRB): ENDOSCOPIC ULTRASOUND (EUS)/ 21/ 318/ [place] (N/A) 2 Days Post-Op Payor: [employer] MEDICARE / Plan: [employer] / Product Type: Managed Care Medicare /  
ASSESSMENT:  
 
REHAB RECOMMENDATIONS: CURRENT LEVEL OF FUNCTION: 
(Most Recently Demonstrated) Recommendation to date pending progress: 
Setting:  Short-term Rehab Equipment:  To Be Determined Bathing:  Moderate Assistance Dressing:  Moderate Assistance Feeding/Grooming:  Set Up Toileting:  Maximal Assistance Functional Mobility:  Minimal Assistance x 2 x RW  
 
ASSESSMENT: 
Ms. Lauren Reyes continues to present with deficits in overall strength, activity tolerance, ADL performance, and functional mobility. Currently resting on 2L 02. Completed self-grooming tasks, including washing face and brushing teeth, with set-up and practiced donning socks with min A, changed gown with min A. Mod A to stand, min A for transfer to chair using RW.  Pt required extra time and occasional rest breaks for all tasks. Pt is progressing towards goals, continue POC. SUBJECTIVE:  
Ms. Jensen Huffman states, \"I'd like to get cleaned up. \" SOCIAL HISTORY/LIVING ENVIRONMENT:  
Home Environment: Skilled nursing facility One/Two Story Residence: One story Living Alone: No 
Support Systems: Family member(s), Child(marga), Skilled nursing facility OBJECTIVE:  
 
PAIN: VITAL SIGNS: LINES/DRAINS:  
Pre Treatment: Pain Screen Pain Scale 1: Numeric (0 - 10) Pain Intensity 1: 0 Post Treatment: 0   Purewick O2 Device: Nasal cannula ACTIVITIES OF DAILY LIVING: I Mod I S SBA CGA Min Mod Max Total NT Comments BASIC ADLs:             
Bathing/ Showering [] [] [] [] [] [] [] [] [] [x] Toileting [] [] [] [] [] [] [] [] [] _ Dressing [] [] [] [] [] [x] [] [] [] _ Donning new gown, socks Feeding [] [] [] [] [] [] [] [] [] [x] Grooming [] [] [x] [] [] [] [] [] [] [] Washing face and brushing teeth Personal Device Care [] [] [] [] [] [] [] [] [] [x] Functional Mobility [] [] [] [] [] [x] [] [] [] [] I=Independent, Mod I=Modified Independent, S=Supervision, SBA=Standby Assistance, CGA=Contact Guard Assistance,  
Min=Minimal Assistance, Mod=Moderate Assistance, Max=Maximal Assistance, Total=Total Assistance, NT=Not Tested MOBILITY: I Mod I S SBA CGA Min Mod Max Total  NT x2 Comments:  
Supine to sit [] [] [] [] [] [x] [] [] [] [] [] Sit to supine [] [] [] [] [] [] [] [] [] [x] [] Sit to stand [] [] [] [] [] [] [x] [] [] [] [] Bed to chair [] [] [] [] [] [x] [] [] [] [] [] X RW  
I=Independent, Mod I=Modified Independent, S=Supervision, SBA=Standby Assistance, CGA=Contact Guard Assistance,  
Min=Minimal Assistance, Mod=Moderate Assistance, Max=Maximal Assistance, Total=Total Assistance, NT=Not Tested BALANCE: Good Fair+ Fair Fair- Poor NT Comments Sitting Static [] [] [x] [] [] [] Sitting Dynamic [] [] [] [] [] [x] Standing Static [] [] [x] [] [] []   
Standing Dynamic [] [] [x] [x] [] [] Constant manual support and use of RW PLAN:  
FREQUENCY/DURATION: OT Plan of Care: 3 times/week for duration of hospital stay or until stated goals are met, whichever comes first. 
 
TREATMENT:  
TREATMENT:  
($$ Self Care/Home Management: 23-37 mins    ) Self Care (28 Minutes): Self care including Upper Body Dressing, Lower Body Dressing and Grooming to increase independence and decrease level of assistance required. TREATMENT GRID: 
N/A 
 
AFTER TREATMENT POSITION/PRECAUTIONS: 
Chair, Needs within reach and RN notified INTERDISCIPLINARY COLLABORATION: 
RN/PCT 
 
TOTAL TREATMENT DURATION: 
OT Patient Time In/Time Out Time In: 2394 Time Out: 9204 Kim Tenorio OT

## 2021-06-04 NOTE — PROGRESS NOTES
[facility] Nephrology Subjective: Hyperkalemia, CKD3b  No new complaints Resting comfortably in chair Review of Systems -  
General ROS: negative for - fever, chills Respiratory ROS: no SOB, cough, WHITE Cardiovascular ROS: no CP, palpitations Gastrointestinal ROS: no N&V, abdominal pain, diarrhea Genito-Urinary ROS: no difficulty voiding, dysuria Neurological ROS: no seizures, focal weekness Objective: 
 
Vitals:  
 06/04/21 0236 06/04/21 0543 06/04/21 2473 06/04/21 6528 BP: (!) 155/64 (!) 154/74 (!) 163/80 (!) 159/87 Pulse:  63 75 69 Resp:  18 16 Temp:  97.6 °F (36.4 °C) 97.3 °F (36.3 °C) SpO2:  100% 100% Weight:  69.9 kg (154 lb 3.2 oz) Height:      
 
 
PE 
Gen: in no acute distress CV:reg rate Chest:clear Abd: soft Ext/Access: no edema Ulus Reusing LAB No results for input(s): WBC, HGB, HCT, PLT, INR, HGBEXT, HCTEXT, PLTEXT, INREXT, HGBEXT, HCTEXT, PLTEXT, INREXT in the last 72 hours. Recent Labs  
  06/04/21 
0452 06/03/21 
0504 06/02/21 
0532  145 143  
K 4.9 4.7 4.9 * 112* 110* CO2 30 31 29 * 87 106* BUN 32* 29* 38* CREA 1.70* 1.33* 1.62* PHOS 4.0*  --   --   
CA 9.1 8.4 8.6 ALB 3.1*  --   --   
 
 
 
 
Radiology A/P:  
Patient Active Problem List  
Diagnosis Code  Diabetes mellitus with hyperosmolarity without hyperglycemic hyperosmolar nonketotic coma ([facility] [address] E11.00  Coronary artery disease involving coronary bypass graft of native heart without angina pectoris I25.810  
 Uncontrolled type 2 diabetes mellitus with hyperglycemia (HCC) E11.65  
 Essential hypertension I10  
 HLD (hyperlipidemia) E78.5  Chronic kidney disease, stage III (moderate) (HCC) N18.30  
 Osteopenia M85.80  Vitamin D deficiency E55.9  Gastroesophageal reflux disease without esophagitis K21.9  Other allergic rhinitis J30.89  Peripheral neuropathy G62.9  
 Primary osteoarthritis involving multiple joints M89.49  
 Insomnia G47.00  RLS (restless legs syndrome) Z77.05  
 Metabolic encephalopathy F98.42  
 Anemia D64.9  Generalized weakness R53.1  Decreased oral intake R63.8  Acute on chronic renal failure (HCC) N17.9, N18.9  Noncompliance Z91.19  
 Hypoalbuminemia due to protein-calorie malnutrition ([facility] [address] E88.09, E46  
 COVID-19 U07.1  Acute respiratory distress syndrome (ARDS) due to severe acute respiratory syndrome coronavirus 2 (SARS-CoV-2) ([facility]) U07.1, J80  Acute hypoxemic respiratory failure ([facility]) J96.01  
 Cardiac arrest ([facility]) I46.9  CHF exacerbation ([facility]) I50.9  Acute diastolic CHF (congestive heart failure) ([facility]) H84.22  
 Diastolic CHF, chronic ([facility]) I50.32  
 Pulmonary HTN ([facility]) I27.20  Hyperkalemia E87.5  UTI (urinary tract infection) N39.0  Chronic hypoxemic respiratory failure ([facility]) J96.11  
 Hypercapnemia R06.89  
 Abnormal LFTs R94.5  Constipation K59.00  Myoclonus G25.3  History of COVID-19 Z86.16 Hyperkalemia - remaining under control with K restricted diet,  lokelma has been stopped. Continuing to monitor. HTN - on lasix FUN2A - stable Kim Clark MD

## 2021-06-05 NOTE — ROUTINE PROCESS
Bedside and Verbal shift change report given to self (oncoming nurse) by Mary Guevara RN (offgoing nurse). Report included the following information SBAR, Kardex, ED Summary, Procedure Summary, MAR and Recent Results.

## 2021-06-05 NOTE — PROGRESS NOTES
Bedside and Verbal shift change report given to self (oncoming nurse) by Justo RN  Report included the following information SBAR, Kardex, Intake/Output and MAR.   
  
Pt resting quietly in bed this am.

## 2021-06-05 NOTE — PROGRESS NOTES
[address] Nephrology Subjective: Hyperkalemia, CKD3b  No new complaints Resting comfortably in bed Review of Systems -  
General ROS: negative for - fever, chills Respiratory ROS: no SOB, cough, WHITE Cardiovascular ROS: no CP, palpitations Gastrointestinal ROS: no N&V, abdominal pain, diarrhea Genito-Urinary ROS: no difficulty voiding, dysuria Neurological ROS: no seizures, focal weekness Objective: 
 
Vitals:  
 06/04/21 1652 06/04/21 1935 06/05/21 0143 06/05/21 0724 BP: (!) 169/85 (!) 162/78 108/60 106/62 Pulse: 63 63 89 78 Resp: 18 17 18 18 Temp: 97.2 °F (36.2 °C) 97.2 °F (36.2 °C) 97.3 °F (36.3 °C) 97.2 °F (36.2 °C) SpO2: 99% 100% 99% 98% Weight:    69.1 kg (152 lb 4.8 oz) Height:      
 
 
PE 
Gen: in no acute distress CV:reg rate Chest:clear Abd: soft Ext/Access: no edema Shon Lerma LAB No results for input(s): WBC, HGB, HCT, PLT, INR, HGBEXT, HCTEXT, PLTEXT, INREXT, HGBEXT, HCTEXT, PLTEXT, INREXT in the last 72 hours. Recent Labs  
  06/05/21 
0516 06/04/21 
0452 06/03/21 
0243  142 145  
K 5.2* 4.9 4.7 * 108* 112* CO2 30 30 31 * 129* 87 BUN 34* 32* 29* CREA 1.85* 1.70* 1.33* PHOS 4.1* 4.0*  --   
CA 9.4 9.1 8.4 ALB 3.1* 3.1*  --   
 
 
 
 
Radiology A/P:  
Patient Active Problem List  
Diagnosis Code  Diabetes mellitus with hyperosmolarity without hyperglycemic hyperosmolar nonketotic coma ([facility] [address] E11.00  Coronary artery disease involving coronary bypass graft of native heart without angina pectoris I25.810  
 Uncontrolled type 2 diabetes mellitus with hyperglycemia (HCC) E11.65  
 Essential hypertension I10  
 HLD (hyperlipidemia) E78.5  Chronic kidney disease, stage III (moderate) (HCC) N18.30  
 Osteopenia M85.80  Vitamin D deficiency E55.9  Gastroesophageal reflux disease without esophagitis K21.9  Other allergic rhinitis J30.89  Peripheral neuropathy G62.9  
 Primary osteoarthritis involving multiple joints M89.49  
 Insomnia G47.00  RLS (restless legs syndrome) U21.85  
 Metabolic encephalopathy E58.28  
 Anemia D64.9  Generalized weakness R53.1  Decreased oral intake R63.8  Acute on chronic renal failure (HCC) N17.9, N18.9  Noncompliance Z91.19  
 Hypoalbuminemia due to protein-calorie malnutrition ([facility] [address] E88.09, E46  
 COVID-19 U07.1  Acute respiratory distress syndrome (ARDS) due to severe acute respiratory syndrome coronavirus 2 (SARS-CoV-2) ([facility]) U07.1, J80  Acute hypoxemic respiratory failure ([facility]) J96.01  
 Cardiac arrest ([facility]) I46.9  CHF exacerbation ([facility]) I50.9  Acute diastolic CHF (congestive heart failure) ([facility]) S04.05  
 Diastolic CHF, chronic ([facility]) I50.32  
 Pulmonary HTN ([facility]) I27.20  Hyperkalemia E87.5  UTI (urinary tract infection) N39.0  Chronic hypoxemic respiratory failure ([facility]) J96.11  
 Hypercapnemia R06.89  
 Abnormal LFTs R94.5  Constipation K59.00  Myoclonus G25.3  History of COVID-19 Z86.16 Hyperkalemia -drifting up higher. I would not treat it today. If is is higher tomrrow, than would put her on Lokelma 10 PO every day. Cheryl Anderson HTN - on lasix CKD3b/4 - stable Kar Mcgee MD

## 2021-06-05 NOTE — PROGRESS NOTES
[facility] Hospitalist Progress Note Name:  Yoon Rivas  Age:81 y.o. Sex:female :  1940 MRN:  [id number] Admit Date:  2021 Reason for Admission: Hyperkalemia [E87.5] Hospital Course/Interval history: This is a. 79 y/o AAF with a h/o HTN, HFpEF, DM2, CKD3 with prior short term dialysis, COPD on 2 L NC with chronic hypoxic respiratory failure, COVID-19 2020 s/p trach now decanulated, pHTN, who was admitted to our service on  with tremors. She currently is a longterm resident at [facility]. Since her prior COVID illness 2020 she has had numerous complications and admissions. She was discharged to [facility] on 21 after a stay for encephalopathy, Klebsiella UTI, workup of elevated LFTs, volume overload, hyperkalemia. She is pending outpatient EUS per GI on 21 for biliary ductal dilation. Her losartan was stopped. She was resumed on oral lasix. She was at her baseline health but was noted to have increased tremors and extremity jerking. She did have some generalized weakness and is essentially wheelchair bound since COVID with extremity jerking/ tremors. Upon visiting on day of admission, daughter noted she seemed worse and had her sent to the ED. K was 6.5 and UA with UTI. She has received insulin/dextrose/ IVF bolus. EKG showed mildly peaked T waves. ARB held, Lokelma was given.  Patient was evaluated by Neuro and gabapentin held. Myoclonus resolved. K intermittent elevated still. GI consulted as pt was to have outpatient EUS on  which showed dilated CBD without obvious obstruction that may represent benign post-CCY dilation; dilated pancreatic duct that may represent main duct IPMN, though did not have worrisome features it was recommended to continue with ongoing surveillance. Nephrology was consulted for recalcitrant hyperkalemia. 
  
 
Subjective (21): Patient is alert awake does not seem to be in acute distress. No fever no chills.   No shortness of breath. Potassium mildly elevated at 5.2 this morning. No nausea no vomiting no abdominal pain. Review of Systems: 14 point review of systems is otherwise negative with the exception of the elements mentioned above. Assessment & Plan This is a 78-year-old female with Hyperkalemia present on admission Potassium is 5.2 this morning. Nephrology on board. Hold off on Lokelma at this time. Myoclonus/tremors resolved Off of gabapentin. Biliary ductal dilatation EUS 6/3 probably benign post cholecystectomy CBD dilatation, main pancreatic duct dilatation, main duct IPMN. Outpatient GI follow-up. VRE UTI Continue Zyvox CKD stage III Creatinine at baseline creatinine is 1.8 this morning. Generalized weakness/debility Long-term resident of [facility] Chronic anemia No active bleeding. Hemoglobin stable. Diet:  DIET ADULT 
DVT PPx: SCDs Code status: DNR Disposition/Expected LOS: Currently awaiting precertification for return to [facility]. Objective:  
 
Patient Vitals for the past 24 hrs: 
 Temp Pulse Resp BP SpO2  
06/05/21 0858 98.2 °F (36.8 °C) 78 20 (!) 147/70 98 % 06/05/21 0613 97.2 °F (36.2 °C) 78 18 106/62 98 % 06/05/21 0143 97.3 °F (36.3 °C) 89 18 108/60 99 % 06/04/21 1935 97.2 °F (36.2 °C) 63 17 (!) 162/78 100 % 06/04/21 1652 97.2 °F (36.2 °C) 63 18 (!) 169/85 99 % 06/04/21 1216 97.2 °F (36.2 °C) (!) 59 17 (!) 164/94 99 % Oxygen Therapy O2 Sat (%): 98 % (06/05/21 0858) Pulse via Oximetry: 71 beats per minute (06/02/21 1208) O2 Device: Nasal cannula (06/05/21 0720) Skin Assessment: Clean, dry, & intact (06/05/21 0008) Skin Protection for O2 Device: No (06/02/21 0430) O2 Flow Rate (L/min): 2 l/min (06/05/21 0720) Body mass index is 25.34 kg/m². Physical Exam:  
General:  No acute distress, speaking in full sentences, no use of accessory muscles Lungs:  Clear to auscultation bilaterally, mild end expiratory wheezing, CV:  Regular rate and rhythm with normal S1 and S2, no murmurs rubs or gallops, Abdomen:  Soft, nontender, nondistended, normoactive bowel sounds Extremities:  No cyanosis clubbing or edema Neuro:  Nonfocal, A&O x3  
Psych:  Normal affect Data Review: 
I have reviewed all labs, meds, and studies from the last 24 hours: 
 
Labs: 
 
Recent Results (from the past 24 hour(s)) GLUCOSE, POC Collection Time: 06/04/21  3:55 PM  
Result Value Ref Range Glucose (POC) 254 (H) 65 - 100 mg/dL Performed by Brianne Kaplan GLUCOSE, POC Collection Time: 06/04/21  8:47 PM  
Result Value Ref Range Glucose (POC) 146 (H) 65 - 100 mg/dL Performed by Lazaro Little RENAL FUNCTION PANEL Collection Time: 06/05/21  5:16 AM  
Result Value Ref Range Sodium 142 136 - 145 mmol/L Potassium 5.2 (H) 3.5 - 5.1 mmol/L Chloride 109 (H) 98 - 107 mmol/L  
 CO2 30 21 - 32 mmol/L Anion gap 3 (L) 7 - 16 mmol/L Glucose 165 (H) 65 - 100 mg/dL BUN 34 (H) 8 - 23 MG/DL Creatinine 1.85 (H) 0.6 - 1.0 MG/DL  
 GFR est AA 33 (L) >60 ml/min/1.73m2 GFR est non-AA 28 (L) >60 ml/min/1.73m2 Calcium 9.4 8.3 - 10.4 MG/DL Phosphorus 4.1 (H) 2.3 - 3.7 MG/DL Albumin 3.1 (L) 3.2 - 4.6 g/dL GLUCOSE, POC Collection Time: 06/05/21  9:02 AM  
Result Value Ref Range Glucose (POC) 207 (H) 65 - 100 mg/dL Performed by [employer] Results Procedure Component Value Units Date/Time CULTURE, BLOOD [[id number]] Collected: 05/30/21 2025 Order Status: Completed Specimen: Blood Updated: 06/04/21 3749 Special Requests: --     
  RIGHT 
FOREARM Culture result: NO GROWTH 5 DAYS     
 CULTURE, BLOOD [[id number]] Collected: 05/30/21 2114 Order Status: Completed Specimen: Blood Updated: 06/04/21 0241 Special Requests: --     
  LEFT 
HAND Culture result: NO GROWTH 5 DAYS     
 COVID-19 RAPID TEST [[id number]] Collected: 06/03/21 1455  Order Status: Completed Specimen: Nasopharyngeal Updated: 06/03/21 1631 Specimen source Nasopharyngeal     
  COVID-19 rapid test Not detected Comment:     
The specimen is NEGATIVE for SARS-CoV-2, the novel coronavirus associated with COVID-19. A negative result does not rule out COVID-19. This test has been authorized by the FDA under an Emergency Use Authorization (EUA) for use by authorized laboratories. Fact sheet for Healthcare Providers: [employer] Fact sheet for Patients: [employer] Methodology: Isothermal Nucleic Acid Amplification CULTURE, URINE [[id number]]  (Abnormal)  (Susceptibility) Collected: 05/30/21 1910 Order Status: Completed Specimen: Cath Urine Updated: 06/03/21 0700 Special Requests: NO SPECIAL REQUESTS Culture result:    
  >100,000 COLONIES/mL * VANCOMYCIN RESISTANT ENTEROCOCCUS FAECIUM *  
     
      
  50,000-100,000 COLONIES/mL NORMAL SKIN REDD ISOLATED  
     
      
  VRE CALLED AND CORRECTLY REPEATED BY: 
Dolly Kat, RN @9692 6.3.21 SC 
  
  
 
 
EKG Results Procedure 720 Value Units Date/Time EKG, 12 LEAD, INITIAL [[id number]] Collected: 05/30/21 1948 Order Status: Completed Updated: 05/31/21 1689 Ventricular Rate 64 BPM   
  Atrial Rate 64 BPM   
  P-R Interval 196 ms QRS Duration 90 ms Q-T Interval 406 ms QTC Calculation (Bezet) 418 ms Calculated P Axis 57 degrees Calculated R Axis 76 degrees Calculated T Axis 102 degrees Diagnosis --  
  !! AGE AND GENDER SPECIFIC ECG ANALYSIS !! Normal sinus rhythm Cannot rule out Anterior infarct (cited on or before 16-APR-2021) Abnormal ECG When compared with ECG of 30-MAY-2021 19:41, No significant change was found Confirmed by Modesta Berger ([id number]) on 5/31/2021 9:58:26 AM 
  
  
 
 
Other Studies: No results found. Current Meds:  
Current Facility-Administered Medications Medication Dose Route Frequency  albuterol-ipratropium (DUO-NEB) 2.5 MG-0.5 MG/3 ML  3 mL Nebulization Q4H PRN  
 rOPINIRole (REQUIP) tablet 0.5 mg  0.5 mg Oral TID  linezolid (ZYVOX) tablet 600 mg  600 mg Oral Q12H  lip protectant (BLISTEX) ointment 1 Each  1 Each Topical PRN  
 furosemide (LASIX) tablet 40 mg  40 mg Oral DAILY  labetaloL (NORMODYNE;TRANDATE) injection 10 mg  10 mg IntraVENous Q4H PRN  
 alcohol 62% (NOZIN) nasal  1 Ampule  1 Ampule Topical Q12H  
 sodium chloride (NS) flush 5-40 mL  5-40 mL IntraVENous Q8H  
 sodium chloride (NS) flush 5-40 mL  5-40 mL IntraVENous PRN  
 ascorbic acid (vitamin C) (VITAMIN C) tablet 500 mg  500 mg Oral EVERY OTHER DAY  aspirin delayed-release tablet 81 mg  81 mg Oral DAILY  clopidogreL (PLAVIX) tablet 75 mg  75 mg Oral DAILY  ferrous sulfate tablet 325 mg  1 Tablet Oral EVERY OTHER DAY  metoprolol succinate (TOPROL-XL) XL tablet 12.5 mg  12.5 mg Oral DAILY  pantoprazole (PROTONIX) tablet 40 mg  40 mg Oral ACB  sodium chloride (NS) flush 5-40 mL  5-40 mL IntraVENous Q8H  
 sodium chloride (NS) flush 5-40 mL  5-40 mL IntraVENous PRN  
 acetaminophen (TYLENOL) tablet 650 mg  650 mg Oral Q6H PRN Or  
 acetaminophen (TYLENOL) suppository 650 mg  650 mg Rectal Q6H PRN  polyethylene glycol (MIRALAX) packet 17 g  17 g Oral DAILY PRN  
 ondansetron (ZOFRAN) injection 4 mg  4 mg IntraVENous Q6H PRN  
 insulin lispro (HUMALOG) injection   SubCUTAneous AC&HS  hydrALAZINE (APRESOLINE) 20 mg/mL injection 10 mg  10 mg IntraVENous Q6H PRN Problem List: 
Hospital Problems as of 6/5/2021 Date Reviewed: 4/19/2021 Codes Class Noted - Resolved POA Myoclonus (Chronic) ICD-10-CM: G25.3 ICD-9-CM: 333.2  5/30/2021 - Present Yes History of COVID-19 (Chronic) ICD-10-CM: Z86.16 
ICD-9-CM: V12.09  5/30/2021 - Present Yes Diastolic CHF, chronic (HCC) ICD-10-CM: I50.32 
ICD-9-CM: 428.32, 428.0  5/5/2021 - Present Yes  [facility] ICD-10-CM: I27.20 ICD-9-CM: 416.8  5/5/2021 - Present Yes * (Principal) Hyperkalemia ICD-10-CM: E87.5 ICD-9-CM: 276.7  5/5/2021 - Present Unknown UTI (urinary tract infection) ICD-10-CM: N39.0 ICD-9-CM: 599.0  5/5/2021 - Present Yes Chronic hypoxemic respiratory failure [facility]) ICD-10-CM: J96.11 
ICD-9-CM: 518.83, 799.02  5/5/2021 - Present Yes Abnormal LFTs ICD-10-CM: R94.5 ICD-9-CM: 790.6  5/5/2021 - Present Yes Anemia ICD-10-CM: D64.9 ICD-9-CM: 285.9  8/8/2019 - Present Yes Uncontrolled type 2 diabetes mellitus with hyperglycemia (HCC) (Chronic) ICD-10-CM: E11.65 ICD-9-CM: 250.02  6/15/2015 - Present Yes Essential hypertension (Chronic) ICD-10-CM: I10 
ICD-9-CM: 401.9  6/15/2015 - Present Yes Chronic kidney disease, stage III (moderate) (HCC) (Chronic) ICD-10-CM: N18.30 ICD-9-CM: 585.3  6/15/2015 - Present Yes Part of this note was written by using a voice dictation software and the note has been proof read but may still contain some grammatical/other typographical errors. Signed By: Lynnette Castillo MD  
[facility] June 5, 2021

## 2021-06-06 NOTE — PROGRESS NOTES
[facility] Subjective: Hyperkalemia, CKD3b  No new complaints Review of Systems -  
General ROS: negative for - fever, chills Respiratory ROS: no SOB, cough, WHITE Cardiovascular ROS: no CP, palpitations Gastrointestinal ROS: no N&V, abdominal pain, diarrhea Genito-Urinary ROS: no difficulty voiding, dysuria Neurological ROS: no seizures, focal weekness Objective: 
 
Vitals:  
 06/05/21 2055 06/05/21 2115 06/06/21 0058 06/06/21 2781 BP: (!) 145/72  (!) 142/78 117/73 Pulse: 69  66 78 Resp: 18  18 18 Temp: 98 °F (36.7 °C)  97.9 °F (36.6 °C) 98.2 °F (36.8 °C) SpO2: 97% 98% 100% 95% Weight:    66.5 kg (146 lb 9.6 oz) Height:      
 
 
PE 
Gen: in no acute distress CV:reg rate Chest:clear Abd: soft Ext/Access: no edema Michael [facility] LAB Recent Labs  
  06/06/21 
0448 WBC 5.1 HGB 8.5* HCT 26.8*  
 Recent Labs  
  06/06/21 
0448 06/05/21 
0516 06/04/21 
7642  142 142  
K 4.8 5.2* 4.9  
 109* 108* CO2 30 30 30 * 165* 129* BUN 35* 34* 32* CREA 1.97* 1.85* 1.70* PHOS 4.4* 4.1* 4.0*  
CA 9.4 9.4 9.1 ALB 3.1* 3.1* 3.1* Radiology A/P:  
Patient Active Problem List  
Diagnosis Code  Diabetes mellitus with hyperosmolarity without hyperglycemic hyperosmolar nonketotic coma ([facility] E11.00  Coronary artery disease involving coronary bypass graft of native heart without angina pectoris I25.810  
 Uncontrolled type 2 diabetes mellitus with hyperglycemia (HCC) E11.65  
 Essential hypertension I10  
 HLD (hyperlipidemia) E78.5  Chronic kidney disease, stage III (moderate) (HCC) N18.30  
 Osteopenia M85.80  Vitamin D deficiency E55.9  Gastroesophageal reflux disease without esophagitis K21.9  Other allergic rhinitis J30.89  Peripheral neuropathy G62.9  
 Primary osteoarthritis involving multiple joints M89.49  
 Insomnia G47.00  RLS (restless legs syndrome) F37.17  
 Metabolic encephalopathy A49.43  
 Anemia D64.9  Generalized weakness R53.1  Decreased oral intake R63.8  Acute on chronic renal failure (HCC) N17.9, N18.9  Noncompliance Z91.19  
 Hypoalbuminemia due to protein-calorie malnutrition ([facility] E88.09, E46  
 COVID-19 U07.1  Acute respiratory distress syndrome (ARDS) due to severe acute respiratory syndrome coronavirus 2 (SARS-CoV-2) ([facility]) U07.1, J80  Acute hypoxemic respiratory failure ([facility]) J96.01  
 Cardiac arrest ([facility]) I46.9  CHF exacerbation ([facility]) I50.9  Acute diastolic CHF (congestive heart failure) ([facility]) S58.14  
 Diastolic CHF, chronic ([facility]) I50.32  
 Pulmonary HTN ([facility]) I27.20  Hyperkalemia E87.5  UTI (urinary tract infection) N39.0  Chronic hypoxemic respiratory failure ([facility]) J96.11  
 Hypercapnemia R06.89  
 Abnormal LFTs R94.5  Constipation K59.00  Myoclonus G25.3  History of COVID-19 Z86.16 Hyperkalemia -Stable at 4.8  Would put her on dialSnocapkelam if her K goes up to upper 5's and stays there. Lisa Carlisle HTN - on lasix CKD3b/4 - stable Devon Michael MD

## 2021-06-06 NOTE — PROGRESS NOTES
[facility] Hospitalist Progress Note Name:  Silvino Navarro  Age:81 y.o. Sex:female :  1940 MRN:  [id number] Admit Date:  2021 Reason for Admission: Hyperkalemia [E87.5] Hospital Course/Interval history: This is a. 79 y/o AAF with a h/o HTN, HFpEF, DM2, CKD3 with prior short term dialysis, COPD on 2 L NC with chronic hypoxic respiratory failure, COVID-19 2020 s/p trach now decanulated, pHTN, who was admitted to our service on  with tremors. She currently is a longterm resident at [facility]. Since her prior COVID illness 2020 she has had numerous complications and admissions. She was discharged to [facility] on 21 after a stay for encephalopathy, Klebsiella UTI, workup of elevated LFTs, volume overload, hyperkalemia. She is pending outpatient EUS per GI on 21 for biliary ductal dilation. Her losartan was stopped. She was resumed on oral lasix. She was at her baseline health but was noted to have increased tremors and extremity jerking. She did have some generalized weakness and is essentially wheelchair bound since COVID with extremity jerking/ tremors. Upon visiting on day of admission, daughter noted she seemed worse and had her sent to the ED. K was 6.5 and UA with UTI. She has received insulin/dextrose/ IVF bolus. EKG showed mildly peaked T waves. ARB held, Lokelma was given.  Patient was evaluated by Neuro and gabapentin held. Myoclonus resolved. K intermittent elevated still. GI consulted as pt was to have outpatient EUS on  which showed dilated CBD without obvious obstruction that may represent benign post-CCY dilation; dilated pancreatic duct that may represent main duct IPMN, though did not have worrisome features it was recommended to continue with ongoing surveillance. Nephrology was consulted for recalcitrant hyperkalemia. 
  
 
Subjective (21): Seen at the bedside. No active complaint. Reports she is feeling better. Tremor has been resolved. Denies chest pain, palpitation, nausea, vomiting or abdominal pain. Review of Systems: 14 point review of systems is otherwise negative with the exception of the elements mentioned above. Assessment & Plan Hyperkalemia present on admission: Potassium is 4.8 this morning. Nephrology on board. [facility] if potassium stays in upper 5's Myoclonus/tremors resolved Off of gabapentin Biliary ductal dilatation EUS 6/3 probably benign post cholecystectomy CBD dilatation, main pancreatic duct dilatation, main duct IPMN Outpatient GI follow-up. VRE UTI Continue Zyvox CKD stage III Creatinine trending up slowly Gentle hydration today Generalized weakness/debility Long-term resident of [facility] Chronic anemia No active bleeding Hemoglobin stable Diet:  DIET ADULT 
DVT PPx: SCDs Code status: DNR Disposition/Expected LOS: Currently awaiting precertification for return to [facility] Objective:  
 
Patient Vitals for the past 24 hrs: 
 Temp Pulse Resp BP SpO2  
06/06/21 1235 96.8 °F (36 °C) 60 18 (!) 152/63 93 % 06/06/21 0854 97.3 °F (36.3 °C) 66 18 (!) 146/79 100 % 06/06/21 0557 98.2 °F (36.8 °C) 78 18 117/73 95 % 06/06/21 0058 97.9 °F (36.6 °C) 66 18 (!) 142/78 100 % 06/05/21 2115     98 % 06/05/21 2055 98 °F (36.7 °C) 69 18 (!) 145/72 97 % 06/05/21 1830    (!) 152/76   
06/05/21 1636 98.3 °F (36.8 °C) 71 16 (!) 181/87 98 % Oxygen Therapy O2 Sat (%): 93 % (06/06/21 1235) Pulse via Oximetry: 68 beats per minute (06/05/21 2115) O2 Device: Nasal cannula (06/06/21 1231) Skin Assessment: Clean, dry, & intact (06/05/21 0008) Skin Protection for O2 Device: No (06/02/21 0430) O2 Flow Rate (L/min): 1 l/min (06/06/21 1231) Body mass index is 24.4 kg/m². Physical Exam:  
General:  No acute distress, speaking in full sentences, no use of accessory muscles Lungs:  Clear to auscultation bilaterally, mild end expiratory wheezing, CV: Regular rate and rhythm with normal S1 and S2, no murmurs rubs or gallops, Abdomen:  Soft, nontender, nondistended, normoactive bowel sounds Extremities:  No cyanosis clubbing or edema Neuro:  Nonfocal, A&O x3  
Psych:  Normal affect Data Review: 
I have reviewed all labs, meds, and studies from the last 24 hours: 
 
Labs: 
 
Recent Results (from the past 24 hour(s)) GLUCOSE, POC Collection Time: 06/05/21  4:40 PM  
Result Value Ref Range Glucose (POC) 193 (H) 65 - 100 mg/dL Performed by [employer] GLUCOSE, POC Collection Time: 06/05/21  9:11 PM  
Result Value Ref Range Glucose (POC) 185 (H) 65 - 100 mg/dL Performed by Bob   
CBC W/O DIFF Collection Time: 06/06/21  4:48 AM  
Result Value Ref Range WBC 5.1 4.3 - 11.1 K/uL  
 RBC 2.68 (L) 4.05 - 5.2 M/uL HGB 8.5 (L) 11.7 - 15.4 g/dL HCT 26.8 (L) 35.8 - 46.3 % .0 (H) 79.6 - 97.8 FL  
 MCH 31.7 26.1 - 32.9 PG  
 MCHC 31.7 31.4 - 35.0 g/dL  
 RDW 14.1 11.9 - 14.6 % PLATELET 869 191 - 083 K/uL MPV 10.2 9.4 - 12.3 FL ABSOLUTE NRBC 0.00 0.0 - 0.2 K/uL RENAL FUNCTION PANEL Collection Time: 06/06/21  4:48 AM  
Result Value Ref Range Sodium 141 136 - 145 mmol/L Potassium 4.8 3.5 - 5.1 mmol/L Chloride 106 98 - 107 mmol/L  
 CO2 30 21 - 32 mmol/L Anion gap 5 (L) 7 - 16 mmol/L Glucose 149 (H) 65 - 100 mg/dL BUN 35 (H) 8 - 23 MG/DL Creatinine 1.97 (H) 0.6 - 1.0 MG/DL  
 GFR est AA 31 (L) >60 ml/min/1.73m2 GFR est non-AA 26 (L) >60 ml/min/1.73m2 Calcium 9.4 8.3 - 10.4 MG/DL Phosphorus 4.4 (H) 2.3 - 3.7 MG/DL Albumin 3.1 (L) 3.2 - 4.6 g/dL GLUCOSE, POC Collection Time: 06/06/21  7:25 AM  
Result Value Ref Range Glucose (POC) 115 (H) 65 - 100 mg/dL Performed by [facility] GLUCOSE, POC Collection Time: 06/06/21 11:17 AM  
Result Value Ref Range Glucose (POC) 267 (H) 65 - 100 mg/dL Performed by [facility] All Micro Results Procedure Component Value Units Date/Time CULTURE, BLOOD [[id number]] Collected: 05/30/21 2025 Order Status: Completed Specimen: Blood Updated: 06/04/21 5649 Special Requests: --     
  RIGHT 
FOREARM Culture result: NO GROWTH 5 DAYS     
 CULTURE, BLOOD [[id number]] Collected: 05/30/21 2114 Order Status: Completed Specimen: Blood Updated: 06/04/21 7245 Special Requests: --     
  LEFT 
HAND Culture result: NO GROWTH 5 DAYS     
 COVID-19 RAPID TEST [[id number]] Collected: 06/03/21 1455 Order Status: Completed Specimen: Nasopharyngeal Updated: 06/03/21 1631 Specimen source Nasopharyngeal     
  COVID-19 rapid test Not detected Comment:     
The specimen is NEGATIVE for SARS-CoV-2, the novel coronavirus associated with COVID-19. A negative result does not rule out COVID-19. This test has been authorized by the FDA under an Emergency Use Authorization (EUA) for use by authorized laboratories. Fact sheet for Healthcare Providers: [employer] Fact sheet for Patients: [employer] Methodology: Isothermal Nucleic Acid Amplification CULTURE, URINE [[id number]]  (Abnormal)  (Susceptibility) Collected: 05/30/21 1910 Order Status: Completed Specimen: Cath Urine Updated: 06/03/21 0700 Special Requests: NO SPECIAL REQUESTS Culture result:    
  >100,000 COLONIES/mL * VANCOMYCIN RESISTANT ENTEROCOCCUS FAECIUM *  
     
      
  50,000-100,000 COLONIES/mL NORMAL SKIN REDD ISOLATED  
     
      
  VRE CALLED AND CORRECTLY REPEATED BY: 
Yobani Caruso RN @8346 6.3.21 SC 
  
  
 
 
EKG Results Procedure 720 Value Units Date/Time EKG, 12 LEAD, INITIAL [[id number]] Collected: 05/30/21 1948 Order Status: Completed Updated: 05/31/21 3206 Ventricular Rate 64 BPM   
  Atrial Rate 64 BPM   
  P-R Interval 196 ms QRS Duration 90 ms Q-T Interval 406 ms   QTC Calculation (Bezet) 418 ms   
  Calculated P Axis 57 degrees Calculated R Axis 76 degrees Calculated T Axis 102 degrees Diagnosis --  
  !! AGE AND GENDER SPECIFIC ECG ANALYSIS !! Normal sinus rhythm Cannot rule out Anterior infarct (cited on or before 16-APR-2021) Abnormal ECG When compared with ECG of 30-MAY-2021 19:41, No significant change was found Confirmed by Mera Zeng ([id number]) on 5/31/2021 9:58:26 AM 
  
  
 
 
Other Studies: No results found. Current Meds:  
Current Facility-Administered Medications Medication Dose Route Frequency  albuterol-ipratropium (DUO-NEB) 2.5 MG-0.5 MG/3 ML  3 mL Nebulization Q4H PRN  
 rOPINIRole (REQUIP) tablet 0.5 mg  0.5 mg Oral TID  linezolid (ZYVOX) tablet 600 mg  600 mg Oral Q12H  lip protectant (BLISTEX) ointment 1 Each  1 Each Topical PRN  
 furosemide (LASIX) tablet 40 mg  40 mg Oral DAILY  labetaloL (NORMODYNE;TRANDATE) injection 10 mg  10 mg IntraVENous Q4H PRN  
 alcohol 62% (NOZIN) nasal  1 Ampule  1 Ampule Topical Q12H  
 sodium chloride (NS) flush 5-40 mL  5-40 mL IntraVENous Q8H  
 sodium chloride (NS) flush 5-40 mL  5-40 mL IntraVENous PRN  
 ascorbic acid (vitamin C) (VITAMIN C) tablet 500 mg  500 mg Oral EVERY OTHER DAY  aspirin delayed-release tablet 81 mg  81 mg Oral DAILY  clopidogreL (PLAVIX) tablet 75 mg  75 mg Oral DAILY  ferrous sulfate tablet 325 mg  1 Tablet Oral EVERY OTHER DAY  metoprolol succinate (TOPROL-XL) XL tablet 12.5 mg  12.5 mg Oral DAILY  pantoprazole (PROTONIX) tablet 40 mg  40 mg Oral ACB  sodium chloride (NS) flush 5-40 mL  5-40 mL IntraVENous Q8H  
 sodium chloride (NS) flush 5-40 mL  5-40 mL IntraVENous PRN  
 acetaminophen (TYLENOL) tablet 650 mg  650 mg Oral Q6H PRN Or  
 acetaminophen (TYLENOL) suppository 650 mg  650 mg Rectal Q6H PRN  polyethylene glycol (MIRALAX) packet 17 g  17 g Oral DAILY PRN  
 ondansetron (ZOFRAN) injection 4 mg  4 mg IntraVENous Q6H PRN  
 insulin lispro (HUMALOG) injection   SubCUTAneous AC&HS  hydrALAZINE (APRESOLINE) 20 mg/mL injection 10 mg  10 mg IntraVENous Q6H PRN Problem List: 
Hospital Problems as of 6/6/2021 Date Reviewed: 4/19/2021 Codes Class Noted - Resolved POA Myoclonus (Chronic) ICD-10-CM: G25.3 ICD-9-CM: 333.2  5/30/2021 - Present Yes History of COVID-19 (Chronic) ICD-10-CM: Z86.16 
ICD-9-CM: V12.09  5/30/2021 - Present Yes Diastolic CHF, chronic (HCC) ICD-10-CM: I50.32 
ICD-9-CM: 428.32, 428.0  5/5/2021 - Present Yes Pulmonary HTN ([facility] [address] ICD-10-CM: I27.20 ICD-9-CM: 416.8  5/5/2021 - Present Yes * (Principal) Hyperkalemia ICD-10-CM: E87.5 ICD-9-CM: 276.7  5/5/2021 - Present Unknown UTI (urinary tract infection) ICD-10-CM: N39.0 ICD-9-CM: 599.0  5/5/2021 - Present Yes Chronic hypoxemic respiratory failure [facility]) ICD-10-CM: J96.11 
ICD-9-CM: 518.83, 799.02  5/5/2021 - Present Yes Abnormal LFTs ICD-10-CM: R94.5 ICD-9-CM: 790.6  5/5/2021 - Present Yes Anemia ICD-10-CM: D64.9 ICD-9-CM: 285.9  8/8/2019 - Present Yes Uncontrolled type 2 diabetes mellitus with hyperglycemia (HCC) (Chronic) ICD-10-CM: E11.65 ICD-9-CM: 250.02  6/15/2015 - Present Yes Essential hypertension (Chronic) ICD-10-CM: I10 
ICD-9-CM: 401.9  6/15/2015 - Present Yes Chronic kidney disease, stage III (moderate) (HCC) (Chronic) ICD-10-CM: N18.30 ICD-9-CM: 585.3  6/15/2015 - Present Yes Part of this note was written by using a voice dictation software and the note has been proof read but may still contain some grammatical/other typographical errors. Signed By: Sol Washburn MD  
[facility] June 6, 2021

## 2021-06-06 NOTE — ROUTINE PROCESS
Bedside and Verbal shift change report given to self (oncoming nurse) by Xavier Reno RN (offgoing nurse). Report included the following information SBAR, Kardex, ED Summary, Procedure Summary, MAR and Recent Results.

## 2021-06-06 NOTE — ROUTINE PROCESS
Bedside and Verbal shift change report to be given to Michelle Cain RN (oncoming nurse) by self (offgoing nurse). Report included the following information SBAR, Kardex, Procedure Summary, Intake/Output and MAR.

## 2021-06-06 NOTE — ROUTINE PROCESS
Bedside and Verbal shift change report to be given to self (oncoming nurse) by Santy Bearden RN (offgoing nurse). Report included the following information SBAR, Kardex, Procedure Summary, Intake/Output and MAR.

## 2021-06-07 NOTE — PROGRESS NOTES
[facility] Hospitalist Progress Note Name:  Nabila Pelletier  Age:81 y.o. Sex:female :  1940 MRN:  [id number] Admit Date:  2021 Reason for Admission: Hyperkalemia [E87.5] Hospital Course/Interval history: This is a. 77 y/o AAF with a h/o HTN, HFpEF, DM2, CKD3 with prior short term dialysis, COPD on 2 L NC with chronic hypoxic respiratory failure, COVID-19 2020 s/p trach now decanulated, pHTN, who was admitted to our service on  with tremors. She currently is a longterm resident at [facility]. Since her prior COVID illness 2020 she has had numerous complications and admissions. She was discharged to [facility] on 21 after a stay for encephalopathy, Klebsiella UTI, workup of elevated LFTs, volume overload, hyperkalemia. She is pending outpatient EUS per GI on 21 for biliary ductal dilation. Her losartan was stopped. She was resumed on oral lasix. She was at her baseline health but was noted to have increased tremors and extremity jerking. She did have some generalized weakness and is essentially wheelchair bound since COVID with extremity jerking/ tremors. Upon visiting on day of admission, daughter noted she seemed worse and had her sent to the ED. K was 6.5 and UA with UTI. She has received insulin/dextrose/ IVF bolus. EKG showed mildly peaked T waves. ARB held, Lokelma was given.  Patient was evaluated by Neuro and gabapentin held. Myoclonus resolved. K intermittent elevated still. GI consulted as pt was to have outpatient EUS on  which showed dilated CBD without obvious obstruction that may represent benign post-CCY dilation; dilated pancreatic duct that may represent main duct IPMN, though did not have worrisome features it was recommended to continue with ongoing surveillance. Nephrology was consulted for recalcitrant hyperkalemia. 
  
 
Subjective (21): Seen at the bedside. No active complaint. Reports she is feeling better.   Denies chest pain, palpitation, nausea, vomiting or abdominal pain. Creatinine trending up. Will do gentle hydration. Case management notified me insurance requiring peer to peer. I called but doctor to speak was not available at that time. Reach out number provided to complete peer to peer. Addendum: Completed peer to peer. Patient can go to long-term care, whenever ready. Review of Systems: 14 point review of systems is otherwise negative with the exception of the elements mentioned above. Assessment & Plan Hyperkalemia present on admission: 
Resolved Nephrology on board. Nephrology recommend [facility] daily if potassium stays in upper 5's 
 
CKD stage III Creatinine trending up slowly Gentle hydration today Nephrology following Myoclonus/tremors resolved Off of gabapentin Biliary ductal dilatation EUS 6/3 probably benign post cholecystectomy CBD dilatation, main pancreatic duct dilatation, main duct IPMN Outpatient GI follow-up. VRE UTI Completed Zyvox course Generalized weakness/debility Long-term resident of [facility] Chronic anemia No active bleeding Hemoglobin stable Diet:  DIET ADULT 
DIET ADULT ORAL NUTRITION SUPPLEMENT 
DVT PPx: SCDs Code status: DNR Disposition/Expected LOS: Currently awaiting precertification for return to [facility] Objective:  
 
Patient Vitals for the past 24 hrs: 
 Temp Pulse Resp BP SpO2  
06/07/21 1245 97.5 °F (36.4 °C) 65 18 119/69 100 % 06/07/21 0749 97.7 °F (36.5 °C) 68 18 (!) 145/77 100 % 06/07/21 0552 98.2 °F (36.8 °C) 69 18 (!) 152/79 98 % 06/07/21 0039 98.4 °F (36.9 °C) 70 16 (!) 149/75 100 % 06/06/21 2036 97.8 °F (36.6 °C) 77 18 (!) 167/89 99 % 06/06/21 1647 97.1 °F (36.2 °C) (!) 58 16 (!) 151/65 95 % Oxygen Therapy O2 Sat (%): 100 % (06/07/21 1245) Pulse via Oximetry: 68 beats per minute (06/05/21 2115) O2 Device: Nasal cannula (06/07/21 0742) Skin Assessment: Clean, dry, & intact (06/07/21 7593) Skin Protection for O2 Device: No (06/02/21 0430) O2 Flow Rate (L/min): 2 l/min (06/07/21 0742) Body mass index is 25.36 kg/m². Physical Exam:  
General:  No acute distress, speaking in full sentences, no use of accessory muscles Lungs:  Clear to auscultation bilaterally, mild end expiratory wheezing, CV:  Regular rate and rhythm with normal S1 and S2, no murmurs rubs or gallops, Abdomen:  Soft, nontender, nondistended, normoactive bowel sounds Extremities:  No cyanosis clubbing or edema Neuro:  Nonfocal, A&O x3  
Psych:  Normal affect Data Review: 
I have reviewed all labs, meds, and studies from the last 24 hours: 
 
Labs: 
 
Recent Results (from the past 24 hour(s)) GLUCOSE, POC Collection Time: 06/06/21  4:07 PM  
Result Value Ref Range Glucose (POC) 140 (H) 65 - 100 mg/dL Performed by [employer] GLUCOSE, POC Collection Time: 06/06/21  9:11 PM  
Result Value Ref Range Glucose (POC) 146 (H) 65 - 100 mg/dL Performed by [employer] RENAL FUNCTION PANEL Collection Time: 06/07/21  4:00 AM  
Result Value Ref Range Sodium 140 136 - 145 mmol/L Potassium 4.7 3.5 - 5.1 mmol/L Chloride 106 98 - 107 mmol/L  
 CO2 30 21 - 32 mmol/L Anion gap 4 (L) 7 - 16 mmol/L Glucose 137 (H) 65 - 100 mg/dL BUN 36 (H) 8 - 23 MG/DL Creatinine 2.02 (H) 0.6 - 1.0 MG/DL  
 GFR est AA 30 (L) >60 ml/min/1.73m2 GFR est non-AA 25 (L) >60 ml/min/1.73m2 Calcium 9.1 8.3 - 10.4 MG/DL Phosphorus 4.5 (H) 2.3 - 3.7 MG/DL Albumin 3.2 3.2 - 4.6 g/dL GLUCOSE, POC Collection Time: 06/07/21  7:50 AM  
Result Value Ref Range Glucose (POC) 163 (H) 65 - 100 mg/dL Performed by [employer] COVID-19 RAPID TEST Collection Time: 06/07/21 11:20 AM  
Result Value Ref Range Specimen source NASAL    
 COVID-19 rapid test Not detected NOTD    
GLUCOSE, POC Collection Time: 06/07/21 11:28 AM  
Result Value Ref Range Glucose (POC) 193 (H) 65 - 100 mg/dL  Performed by [employer] All Micro Results Procedure Component Value Units Date/Time COVID-19 RAPID TEST [[id number]] Collected: 06/07/21 1120 Order Status: Completed Specimen: Nasopharyngeal Updated: 06/07/21 1154 Specimen source NASAL     
  COVID-19 rapid test Not detected Comment:     
The specimen is NEGATIVE for SARS-CoV-2, the novel coronavirus associated with COVID-19. A negative result does not rule out COVID-19. This test has been authorized by the FDA under an Emergency Use Authorization (EUA) for use by authorized laboratories. Fact sheet for Healthcare Providers: [employer] Fact sheet for Patients: [employer]nz Methodology: Isothermal Nucleic Acid Amplification CULTURE, BLOOD [[id number]] Collected: 05/30/21 2025 Order Status: Completed Specimen: Blood Updated: 06/04/21 7556 Special Requests: --     
  RIGHT 
FOREARM Culture result: NO GROWTH 5 DAYS     
 CULTURE, BLOOD [[id number]] Collected: 05/30/21 2114 Order Status: Completed Specimen: Blood Updated: 06/04/21 3595 Special Requests: --     
  LEFT 
HAND Culture result: NO GROWTH 5 DAYS     
 COVID-19 RAPID TEST [[id number]] Collected: 06/03/21 1455 Order Status: Completed Specimen: Nasopharyngeal Updated: 06/03/21 1631 Specimen source Nasopharyngeal     
  COVID-19 rapid test Not detected Comment:     
The specimen is NEGATIVE for SARS-CoV-2, the novel coronavirus associated with COVID-19. A negative result does not rule out COVID-19. This test has been authorized by the FDA under an Emergency Use Authorization (EUA) for use by authorized laboratories. Fact sheet for Healthcare Providers: [employer] Fact sheet for Patients: [employer] Methodology: Isothermal Nucleic Acid Amplification  CULTURE, URINE [[id number]]  (Abnormal) (Susceptibility) Collected: 05/30/21 1910 Order Status: Completed Specimen: Cath Urine Updated: 06/03/21 0700 Special Requests: NO SPECIAL REQUESTS Culture result:    
  >100,000 COLONIES/mL * VANCOMYCIN RESISTANT ENTEROCOCCUS FAECIUM *  
     
      
  50,000-100,000 COLONIES/mL NORMAL SKIN REDD ISOLATED  
     
      
  VRE CALLED AND CORRECTLY REPEATED BY: 
Gregory Covarrubias RN @8943 6.3.21 SC 
  
  
 
 
EKG Results Procedure 720 Value Units Date/Time EKG, 12 LEAD, INITIAL [[id number]] Collected: 05/30/21 1948 Order Status: Completed Updated: 05/31/21 3433 Ventricular Rate 64 BPM   
  Atrial Rate 64 BPM   
  P-R Interval 196 ms QRS Duration 90 ms Q-T Interval 406 ms QTC Calculation (Bezet) 418 ms Calculated P Axis 57 degrees Calculated R Axis 76 degrees Calculated T Axis 102 degrees Diagnosis --  
  !! AGE AND GENDER SPECIFIC ECG ANALYSIS !! Normal sinus rhythm Cannot rule out Anterior infarct (cited on or before 16-APR-2021) Abnormal ECG When compared with ECG of 30-MAY-2021 19:41, No significant change was found Confirmed by Yannick Gutierrez ([id number]) on 5/31/2021 9:58:26 AM 
  
  
 
 
Other Studies: No results found. Current Meds:  
Current Facility-Administered Medications Medication Dose Route Frequency  albuterol-ipratropium (DUO-NEB) 2.5 MG-0.5 MG/3 ML  3 mL Nebulization Q4H PRN  
 rOPINIRole (REQUIP) tablet 0.5 mg  0.5 mg Oral TID  linezolid (ZYVOX) tablet 600 mg  600 mg Oral Q12H  lip protectant (BLISTEX) ointment 1 Each  1 Each Topical PRN  
 furosemide (LASIX) tablet 40 mg  40 mg Oral DAILY  labetaloL (NORMODYNE;TRANDATE) injection 10 mg  10 mg IntraVENous Q4H PRN  
 alcohol 62% (NOZIN) nasal  1 Ampule  1 Ampule Topical Q12H  
 sodium chloride (NS) flush 5-40 mL  5-40 mL IntraVENous Q8H  
 sodium chloride (NS) flush 5-40 mL  5-40 mL IntraVENous PRN  
 ascorbic acid (vitamin C) (VITAMIN C) tablet 500 mg  500 mg Oral EVERY OTHER DAY  aspirin delayed-release tablet 81 mg  81 mg Oral DAILY  clopidogreL (PLAVIX) tablet 75 mg  75 mg Oral DAILY  ferrous sulfate tablet 325 mg  1 Tablet Oral EVERY OTHER DAY  metoprolol succinate (TOPROL-XL) XL tablet 12.5 mg  12.5 mg Oral DAILY  pantoprazole (PROTONIX) tablet 40 mg  40 mg Oral ACB  sodium chloride (NS) flush 5-40 mL  5-40 mL IntraVENous Q8H  
 sodium chloride (NS) flush 5-40 mL  5-40 mL IntraVENous PRN  
 acetaminophen (TYLENOL) tablet 650 mg  650 mg Oral Q6H PRN Or  
 acetaminophen (TYLENOL) suppository 650 mg  650 mg Rectal Q6H PRN  polyethylene glycol (MIRALAX) packet 17 g  17 g Oral DAILY PRN  
 ondansetron (ZOFRAN) injection 4 mg  4 mg IntraVENous Q6H PRN  
 insulin lispro (HUMALOG) injection   SubCUTAneous AC&HS  hydrALAZINE (APRESOLINE) 20 mg/mL injection 10 mg  10 mg IntraVENous Q6H PRN Problem List: 
Hospital Problems as of 6/7/2021 Date Reviewed: 4/19/2021 Codes Class Noted - Resolved POA Myoclonus (Chronic) ICD-10-CM: G25.3 ICD-9-CM: 333.2  5/30/2021 - Present Yes History of COVID-19 (Chronic) ICD-10-CM: Z86.16 
ICD-9-CM: V12.09  5/30/2021 - Present Yes Diastolic CHF, chronic (HCC) ICD-10-CM: I50.32 
ICD-9-CM: 428.32, 428.0  5/5/2021 - Present Yes Pulmonary HTN ([facility] [address] ICD-10-CM: I27.20 ICD-9-CM: 416.8  5/5/2021 - Present Yes * (Principal) Hyperkalemia ICD-10-CM: E87.5 ICD-9-CM: 276.7  5/5/2021 - Present Unknown UTI (urinary tract infection) ICD-10-CM: N39.0 ICD-9-CM: 599.0  5/5/2021 - Present Yes Chronic hypoxemic respiratory failure [facility]) ICD-10-CM: J96.11 
ICD-9-CM: 518.83, 799.02  5/5/2021 - Present Yes Abnormal LFTs ICD-10-CM: R94.5 ICD-9-CM: 790.6  5/5/2021 - Present Yes Anemia ICD-10-CM: D64.9 ICD-9-CM: 285.9  8/8/2019 - Present Yes Uncontrolled type 2 diabetes mellitus with hyperglycemia (HCC) (Chronic) ICD-10-CM: E11.65 ICD-9-CM: 250.02  6/15/2015 - Present Yes Essential hypertension (Chronic) ICD-10-CM: I10 
ICD-9-CM: 401.9  6/15/2015 - Present Yes Chronic kidney disease, stage III (moderate) (HCC) (Chronic) ICD-10-CM: N18.30 ICD-9-CM: 585.3  6/15/2015 - Present Yes Part of this note was written by using a voice dictation software and the note has been proof read but may still contain some grammatical/other typographical errors. Signed By: Willard Reveles MD  
[employer] [facility] June 7, 2021

## 2021-06-07 NOTE — PROGRESS NOTES
ACUTE OT GOALS: 
(Developed with and agreed upon by patient and/or caregiver.) 1. Patient will complete lower body bathing and dressing with min A and adaptive equipment as needed. 2. Patient will complete toileting with min A.  
3. Patient will tolerate 25 minutes of OT treatment with 1-2 rest breaks to increase activity tolerance for ADLs. 4. Patient will complete functional transfers with min A and adaptive equipment as needed. 5. Patient will complete functional activity while seated edge of bed with SBA and adaptive equipment as needed. 6. Patient will tolerate 15 minutes unsupported sitting balance with CGA in preparation for ADL performance. 7. Patient will tolerate 30 minutes BUE therapeutic activities to increase use of BUE during ADL performance.  
  
Timeframe: 7 visits OCCUPATIONAL THERAPY: Daily Note OT Treatment Day # 4 Nabila Pelletier is a 78 y.o. female PRIMARY DIAGNOSIS: Hyperkalemia Hyperkalemia [E87.5] Procedure(s) (LRB): ENDOSCOPIC ULTRASOUND (EUS)/ 21/ 318/ RECOVERY BAY 6 (N/A) 5 Days Post-Op Payor: [employer] MEDICARE / Plan: [employer] / Product Type: Managed Care Medicare /  
ASSESSMENT:  
 
REHAB RECOMMENDATIONS: CURRENT LEVEL OF FUNCTION: 
(Most Recently Demonstrated) Recommendation to date pending progress: 
Setting:  Short-term Rehab Equipment:  To Be Determined Bathing:  Moderate Assistance Dressing:  Moderate Assistance Feeding/Grooming:  Set Up Toileting:  Maximal Assistance Functional Mobility:  Minimal Assistance x 2 x RW  
 
ASSESSMENT: 
Ms. Rachell Jefferson continues to present with deficits in overall strength, activity tolerance, ADL performance, and functional mobility. Currently resting on 2L 02. Transitioned to sitting EOB with mod A. Intact static sitting balance; fair (-) dynamic EOB sitting. Participated in total body bathing tasks with min A. New gown donned with min A.  Completed self-grooming tasks including washing face and brushing teeth with set-up. Stood and managed static standing balance with min A x 2 x RW to help facilitate juan-care. Total A for bowel hygiene and brief management. Transferred to chair with min A x 2 x RW. Pt is progressing towards goals. Will continue OT efforts as indicated. SUBJECTIVE:  
Ms. Britany Quinn states, \"Im tired today. SOCIAL HISTORY/LIVING ENVIRONMENT:  
Home Environment: Skilled nursing facility One/Two Story Residence: One story Living Alone: No 
Support Systems: Family member(s), Child(marga), Skilled nursing facility OBJECTIVE:  
 
PAIN: VITAL SIGNS: LINES/DRAINS:  
Pre Treatment: Pain Screen Pain Scale 1: Numeric (0 - 10) Pain Intensity 1: 0 Post Treatment: 0   Purewick O2 Device: Nasal cannula ACTIVITIES OF DAILY LIVING: I Mod I S SBA CGA Min Mod Max Total NT Comments BASIC ADLs:             
Bathing/ Showering [] [] [] [] [] [x] [] [] [] [] Total body bathing Toileting [] [] [] [] [] [] [] [] [x] _ Juan-care/bowel hygiene Dressing [] [] [] [] [] [x] [] [] [] _ Donning new gown Feeding [] [] [] [] [] [] [] [] [] [x] Grooming [] [] [x] [] [] [] [] [] [] [] Washing face and brushing teeth Personal Device Care [] [] [] [] [] [] [] [] [] [x] Functional Mobility [] [] [] [] [] [x] [] [] [] [] I=Independent, Mod I=Modified Independent, S=Supervision, SBA=Standby Assistance, CGA=Contact Guard Assistance,  
Min=Minimal Assistance, Mod=Moderate Assistance, Max=Maximal Assistance, Total=Total Assistance, NT=Not Tested MOBILITY: I Mod I S SBA CGA Min Mod Max Total  NT x2 Comments:  
Supine to sit [] [] [] [] [] [] [x] [] [] [] [] Sit to supine [] [] [] [] [] [] [] [] [] [x] [] Sit to stand [] [] [] [] [] [x] [] [] [] [] [x] Bed to chair [] [] [] [] [] [x] [] [] [] [] [x] X RW  
I=Independent, Mod I=Modified Independent, S=Supervision, SBA=Standby Assistance, CGA=Contact Guard Assistance,  
Min=Minimal Assistance, Mod=Moderate Assistance, Max=Maximal Assistance, Total=Total Assistance, NT=Not Tested BALANCE: Good Fair+ Fair Fair- Poor NT Comments Sitting Static [] [] [x] [] [] [] Sitting Dynamic [] [] [] [] [x] [] Standing Static [] [] [x] [] [] []   
Standing Dynamic [] [] [x] [x] [] [] Constant manual support and use of RW PLAN:  
FREQUENCY/DURATION: OT Plan of Care: 3 times/week for duration of hospital stay or until stated goals are met, whichever comes first. 
 
TREATMENT:  
TREATMENT:  
($$ Self Care/Home Management: 23-37 mins    ) Self Care (23 Minutes): Self care including Upper Body Bathing, Lower Body Bathing, Toileting, Upper Body Dressing and Grooming to increase independence and decrease level of assistance required. TREATMENT GRID: 
N/A 
 
AFTER TREATMENT POSITION/PRECAUTIONS: 
Chair, Needs within reach and RN notified INTERDISCIPLINARY COLLABORATION: 
RN/PCT, PT/PTA and OT/PERALTA TOTAL TREATMENT DURATION: 
OT Patient Time In/Time Out Time In: 1026 Time Out: 9206 Alexa Worrell OT

## 2021-06-07 NOTE — PROGRESS NOTES
Comprehensive Nutrition Assessment Type and Reason for Visit: Initial, RD nutrition re-screen/LOS Nutrition Recommendations/Plan:  
Meals and Snacks: 
Continue current diet. Nutrition Supplement Therapy:  Medical food supplement therapy:  Initiate Nepro twice per day (this provides 420 kcal and 19 grams protein per bottle) Malnutrition Assessment: 
Malnutrition Status: At risk for malnutrition (specify) (Recent wt loss with COVID, altered tastes, advanced age) Nutrition Assessment:  
Nutrition History: Patient well known to nutrition services from extended admission with COVID infection. She had trach and PEG during that admission. She eventually transitioned to PO, but remain on supplemental bolus feeds due to PO. Patient reports eating okay at [facility]. No longer receiving TF. ? PEG removal.     
Nutrition Background: Patient with PMH pancreatitis, CAD, DM with DKA, CKD with prior short term HD, HLD, HTN, COPD, COVID with trach and PEG - now decanulated. She presented from [facility] with tremors and was admitted with UTI and hyperkalemia. GI also following for elevated LFT s/p EUS. Daily Update: 
Patient noted with some cognitive issues since COVID. She is able to tell me that she is eating okay. She states that she continues with taste changes since COVID. She states that she can tolerate some foods well and others not as well. She states that she has been eating soup for lunch and tolerates tomato, but they were out of tomato today. She is agreeable to nutrition supplement to help meet needs. Current Nutrition Therapies: ADULT DIET Regular; Low Sodium (2 gm); Low Potassium (Less than 3000 mg/day) Current Intake: Average Meal Intake:  (variable 0-75%) Average Supplement Intake: None ordered Anthropometric Measures: 
Height: 5' 5\" (165.1 cm) Current Body Wt: 69.1 kg (152 lb 5.4 oz) (6/7), Weight source: Bed scale BMI: 25.4, Normal weight (BMI 22.0-24.9) age over 72 Admission Body Weight: 138 lb 7.2 oz (standing scale 5/30) Ideal Body Weight (lbs) (Calculated): 125 lbs (57 kg), 121.9 % Usual Body Wt: 73.5 kg (162 lb 0.6 oz) (per EMR 11/2020), Percent weight change: -6 
       
Edema: LLE: Trace (6/6/2021  8:54 AM) RLE: Trace (6/6/2021  8:54 AM) Estimated Daily Nutrient Needs: 
Energy (kcal/day): 3976-6175 (Kcal/kg (23-28), Weight Used: Ideal (56.8 kg)) Protein (g/day): 57-63 (1-1.1 g/kg) Weight Used: (Ideal) Fluid (ml/day):   (1 ml/kcal) Nutrition Diagnosis:  
· Inadequate oral intake related to  (taste changes) as evidenced by  (patient reported barrier to PO, intake as above) Nutrition Interventions:  
Food and/or Nutrient Delivery: Continue current diet, Start oral nutrition supplement Coordination of Nutrition Care: Continue to monitor while inpatient Plan of Care discussed with Kobi Walters RN Goals: Active Goal: Meet at least 75% by RD follow-up Nutrition Monitoring and Evaluation:  
  
Food/Nutrient Intake Outcomes: Food and nutrient intake, Supplement intake Discharge Planning: Too soon to determine [address] [place], LD on 6/7/2021 at 2:01 PM 
Contact: [phone]

## 2021-06-07 NOTE — PROGRESS NOTES
Spiritual Care Visit, initial visit. Visited with patient at bedside. Prayed for patient's healing and health. Visit by Oumar Whitmore, Staff .  Pelon., Oadlys.B., B.A.

## 2021-06-07 NOTE — PROGRESS NOTES
ACUTE PHYSICAL THERAPY GOALS: 
(Developed with and agreed upon by patient and/or caregiver. ) LTG: 
(1.)Ms. Stephen Contreras will move from supine to sit and sit to supine , scoot up and down and roll side to side in bed with MODIFIED INDEPENDENCE within 7 treatment day(s). (2.)Ms. Stephen Contreras will transfer from bed to chair and chair to bed with STAND BY ASSIST using the least restrictive device within 7 treatment day(s). (3.)Ms. Stephen Contreras will ambulate with CONTACT GUARD ASSIST for 50 feet with the least restrictive device within 7 treatment day(s). (4.)Ms. Stephen Contreras will participate in therapeutic activity/exercises x 25 minutes for increased strength within 7 treatment days. PHYSICAL THERAPY: Daily Note and AM Treatment Day # 5 Darlin Chun is a 78 y.o. female PRIMARY DIAGNOSIS: Hyperkalemia Hyperkalemia [E87.5] Procedure(s) (LRB): ENDOSCOPIC ULTRASOUND (EUS)/ 21/ 318/ RECOVERY BAY 6 (N/A) 5 Days Post-Op ASSESSMENT:  
 
REHAB RECOMMENDATIONS: CURRENT LEVEL OF FUNCTION: 
(Most Recently Demonstrated) Recommendation to date pending progress: 
Setting:  Short-term Rehab Equipment:  To Be Determined Bed Mobility:  Minimal Assistance x 2 Sit to Stand:  Minimal Assistance x 2 Transfers:  Minimal Assistance x 2 Gait/Mobility:  Minimal Assistance x 2  
 
ASSESSMENT: 
Ms. Stephen Contreras was supine in bed upon arrival and agreeable. She appeared slightly more confused today then previous sessions. Pt required Bonita x 2 for bed mobility and fair sitting balance. Pt participated in seated EOB activities as well as standing activities with CGA/RW. Pt required rest break and then ambulated to chair with Bonita x 2/RW. OT present to assist with ADLs while PT addressed transfers/ambulation. No progress today. SUBJECTIVE:  
Ms. Stephen Contreras states, \"[employer]\" SOCIAL HISTORY/ LIVING ENVIRONMENT: see eval 
Home Environment: Skilled nursing facility One/Two Story Residence: One story Living Alone: No 
Support Systems: Family member(s), Child(marga), Skilled nursing facility OBJECTIVE:  
 
PAIN: VITAL SIGNS: LINES/DRAINS:  
Pre Treatment: Pain Screen Pain Scale 1: Numeric (0 - 10) Pain Intensity 1: 0 none Post Treatment: none   IV and Purewick O2 Device: Nasal cannula MOBILITY: I Mod I S SBA CGA Min Mod Max Total  NT x2 Comments:  
Bed Mobility Rolling [] [] [] [] [] [x] [] [] [] [] [x] Supine to Sit [] [] [] [] [] [x] [] [] [] [] [x] Scooting [] [] [] [] [] [x] [x] [] [] [] [] Sit to Supine [] [] [] [] [] [] [] [] [] [] []   
Transfers Sit to Stand [] [] [] [] [] [x] [] [] [] [] [x] Bed to Chair [] [] [] [] [] [x] [] [] [] [] [x] Stand to Sit [] [] [] [] [] [x] [] [] [] [] [x] I=Independent, Mod I=Modified Independent, S=Supervision, SBA=Standby Assistance, CGA=Contact Guard Assistance,  
Min=Minimal Assistance, Mod=Moderate Assistance, Max=Maximal Assistance, Total=Total Assistance, NT=Not Tested BALANCE: Good Fair+ Fair Fair- Poor NT Comments Sitting Static [] [x] [] [] [] [] Sitting Dynamic [] [] [x] [] [] []   
         
Standing Static [] [] [] [x] [] []   
Standing Dynamic [] [] [] [] [x] [] GAIT: I Mod I S SBA CGA Min Mod Max Total  NT x2 Comments:  
Level of Assistance [] [] [] [] [] [x] [] [] [] [] [x] Distance 4 ft CHALO Khan Gait Quality Decreased B LE step length Weightbearing Status N/A I=Independent, Mod I=Modified Independent, S=Supervision, SBA=Standby Assistance, CGA=Contact Guard Assistance,  
Min=Minimal Assistance, Mod=Moderate Assistance, Max=Maximal Assistance, Total=Total Assistance, NT=Not Tested PLAN:  
FREQUENCY/DURATION: PT Plan of Care: 3 times/week for duration of hospital stay or until stated goals are met, whichever comes first. 
TREATMENT:  
 
TREATMENT:  
($$ Therapeutic Activity: 23-37 mins    ) Therapeutic Activity (23 Minutes):  Therapeutic activity included Rolling, Supine to Sit, Scooting, Transfer Training, Ambulation on level ground, Sitting balance  and Standing balance to improve functional Mobility, Strength and Activity tolerance. TREATMENT GRID: 
 Date: 
6/2/21 Date: 
6/3/21 Date: Activity/Exercise Parameters Parameters Parameters Ankle pumps 10 B 10 B   
LAQs 10 B 10 B   
marching 10 B 10 B Hip ABD/ADD 10 B 10 B Quad sets  10 B AFTER TREATMENT POSITION/PRECAUTIONS: 
Alarm Activated, Chair, Needs within reach and RN notified INTERDISCIPLINARY COLLABORATION: 
RN/PCT, PT/PTA and OT/PERALTA TOTAL TREATMENT DURATION: 
PT Patient Time In/Time Out Time In: 0025 Time Out: 1052 Rina Will, PT, DPT

## 2021-06-07 NOTE — ROUTINE PROCESS
Verbal bedside report received from Dominga Yee [place]. Assumed care of patient. Pt assisted back to bed.

## 2021-06-07 NOTE — PROGRESS NOTES
[facility] Subjective: Hyperkalemia, CKD3b  No new complaints Review of Systems -  
General ROS: negative for - fever, chills Respiratory ROS: no SOB, cough, WHITE Cardiovascular ROS: no CP, palpitations Gastrointestinal ROS: no N&V, abdominal pain, diarrhea Genito-Urinary ROS: no difficulty voiding, dysuria Neurological ROS: no seizures, focal weekness Objective: 
 
Vitals:  
 06/06/21 2036 06/07/21 3057 06/07/21 6956 06/07/21 9791 BP: (!) 167/89 (!) 149/75 (!) 152/79 (!) 145/77 Pulse: 77 70 69 68 Resp: 18 16 18 18 Temp: 97.8 °F (36.6 °C) 98.4 °F (36.9 °C) 98.2 °F (36.8 °C) 97.7 °F (36.5 °C) SpO2: 99% 100% 98% 100% Weight:   69.1 kg (152 lb 6.4 oz) Height:      
 
 
PE 
Gen: in no acute distress CV:reg rate Chest:clear Abd: soft Ext/Access: no edema Sarah Rad LAB Recent Labs  
  06/06/21 
0448 WBC 5.1 HGB 8.5* HCT 26.8*  
 Recent Labs  
  06/07/21 
0400 06/06/21 
0448 06/05/21 
2777  141 142  
K 4.7 4.8 5.2*  
 106 109* CO2 30 30 30 * 149* 165* BUN 36* 35* 34* CREA 2.02* 1.97* 1.85* PHOS 4.5* 4.4* 4.1*  
CA 9.1 9.4 9.4 ALB 3.2 3.1* 3.1* Radiology A/P:  
Patient Active Problem List  
Diagnosis Code  Diabetes mellitus with hyperosmolarity without hyperglycemic hyperosmolar nonketotic coma ([facility] [address] E11.00  Coronary artery disease involving coronary bypass graft of native heart without angina pectoris I25.810  
 Uncontrolled type 2 diabetes mellitus with hyperglycemia (HCC) E11.65  
 Essential hypertension I10  
 HLD (hyperlipidemia) E78.5  Chronic kidney disease, stage III (moderate) (HCC) N18.30  
 Osteopenia M85.80  Vitamin D deficiency E55.9  Gastroesophageal reflux disease without esophagitis K21.9  Other allergic rhinitis J30.89  Peripheral neuropathy G62.9  
 Primary osteoarthritis involving multiple joints M89.49  
 Insomnia G47.00  RLS (restless legs syndrome) G25.81  
 Metabolic encephalopathy O34.17  
 Anemia D64.9  Generalized weakness R53.1  Decreased oral intake R63.8  Acute on chronic renal failure (HCC) N17.9, N18.9  Noncompliance Z91.19  
 Hypoalbuminemia due to protein-calorie malnutrition ([facility] [address] E88.09, E46  
 COVID-19 U07.1  Acute respiratory distress syndrome (ARDS) due to severe acute respiratory syndrome coronavirus 2 (SARS-CoV-2) ([facility]) U07.1, J80  Acute hypoxemic respiratory failure ([facility]) J96.01  
 Cardiac arrest ([facility]) I46.9  CHF exacerbation ([facility]) I50.9  Acute diastolic CHF (congestive heart failure) ([facility]) K35.99  
 Diastolic CHF, chronic ([facility]) I50.32  
 Pulmonary HTN ([facility]) I27.20  Hyperkalemia E87.5  UTI (urinary tract infection) N39.0  Chronic hypoxemic respiratory failure ([facility]) J96.11  
 Hypercapnemia R06.89  
 Abnormal LFTs R94.5  Constipation K59.00  Myoclonus G25.3  History of COVID-19 Z86.16 I/P  
CKD3b/4 slightly worse, started on IVF. F/u Hyperkalemia -resolved VRE UTI : Zyvox - Lavaun [facility], MD

## 2021-06-07 NOTE — ROUTINE PROCESS
Bedside and Verbal shift change report given to Gini Khan RN (oncoming nurse) by self Christina rosenberg). Report included the following information SBAR, Kardex, ED Summary, Procedure Summary, MAR and Recent Results.

## 2021-06-07 NOTE — PROGRESS NOTES
ARAMIS f/u with [facility] on pre-cert. He reported that it is still pending but believed that it would be approved today. Pt had a Rapid placed on 6/3, however, is out of three day timeframe for the facility. Will order another COVID swab when d/c is confirmed. 1142-Pts insurance is [id number] [id number] EST today. CM informed MD of contact number: [phone], option #5. Updated facility. 1529-MD reported that she attempted to contact the [facility] phone number but was told that the MD was not available and would return her call. At the time of this note, the MD has not returned the hospitalists phone call. Care Management Interventions PCP Verified by CM: Yes Mode of Transport at Discharge: BLS Transition of Care Consult (CM Consult): Discharge Planning, SNF (LTC resident at [facility]) Discharge Durable Medical Equipment: No 
Physical Therapy Consult: Yes Occupational Therapy Consult: Yes Speech Therapy Consult: Yes Current Support Network: Family Lives [place] Confirm Follow Up Transport: Family The Plan for Transition of Care is Related to the Following Treatment Goals : Return to [facility] and back to her baseline Discharge Location Discharge Placement: Skilled nursing facility (LTC resident at [facility])

## 2021-06-08 NOTE — DISCHARGE SUMMARY
Date of Admission: 5/30/2021 Date of Discharge: 6/8/2021 Discharge Diagnoses: Hyperkalemia UTI Active Hospital Problems Diagnosis Date Noted  Myoclonus 05/30/2021  
 History of COVID-19 05/30/2021  Hyperkalemia 05/05/2021  UTI (urinary tract infection) 05/05/2021  Pulmonary HTN ([facility] 05/05/2021  Diastolic CHF, chronic ([facility] 05/05/2021  Abnormal LFTs 05/05/2021  Chronic hypoxemic respiratory failure ([facility] 05/05/2021  Anemia 08/08/2019  Uncontrolled type 2 diabetes mellitus with hyperglycemia ([facility] 06/15/2015  Essential hypertension 06/15/2015  Chronic kidney disease, stage III (moderate) ([place] [address].) 06/15/2015 Discharge Medications: 
Current Discharge Medication List  
  
START taking these medications Details  
albuterol-ipratropium (DUO-NEB) 2.5 mg-0.5 mg/3 ml nebu 3 mL by Nebulization route every four (4) hours as needed for Wheezing or Shortness of Breath. Qty: 15 Nebule, Refills: 0 Start date: 6/8/2021  
  
furosemide (LASIX) 20 mg tablet Take 1 Tablet by mouth every other day. Qty: 15 Tablet, Refills: 0 Start date: 6/10/2021 CONTINUE these medications which have CHANGED Details  
insulin lispro (HUMALOG) 100 unit/mL injection 0-10 Units by SubCUTAneous route Before breakfast, lunch, and dinner. For Blood Sugar (mg/dL) of:    
Less than 150 =   0 units 150 -199 =   2 units 200 -249 =   4 units 250 -299 =   6 units 300 -349 =   8 units 350 and above = 10 units Qty: 1 Vial, Refills: 0 Start date: 6/8/2021 CONTINUE these medications which have NOT CHANGED Details  
metoprolol succinate (TOPROL-XL) 25 mg XL tablet Take 0.5 Tabs by mouth daily. Qty: 30 Tab, Refills: 0  
  
ferrous sulfate 325 mg (65 mg iron) tablet Take  by mouth every other day. ascorbic acid, vitamin C, (Vitamin C) 500 mg tablet Take  by mouth every other day. aspirin delayed-release 81 mg tablet Take  by mouth daily.   
  
clopidogrel (PLAVIX) 75 mg tab Take 75 mg by mouth. rOPINIRole (REQUIP) 0.5 mg tablet Take 1 Tab by mouth nightly as needed. Qty: 90 Tab, Refills: 1 Associated Diagnoses: RLS (restless legs syndrome) Omeprazole delayed release (PRILOSEC D/R) 20 mg tablet Take 20 mg by mouth daily. Insulin Needles, Disposable, 31 gauge x 5/16\" ndle by SubCUTAneous route Before breakfast, lunch, dinner and at bedtime. Qty: 1 Package, Refills: 11 Blood-Glucose Meter (ONETOUCH ULTRAMINI) monitoring kit Use as directed Qty: 1 Kit, Refills: 0 Comments: Dx: 250.00 insulin based  
  
glucose blood VI test strips (ONETOUCH ULTRA TEST) strip Test 4 times daily as directed Qty: 100 Strip, Refills: 11 Comments: Dx: 250.00 insulin based Lancets (ONETOUCH ULTRASOFT LANCETS) misc Test 4 times daily as directed Qty: 100 Each, Refills: 11 Comments: Dx: 250.00 insulin based  
  
gabapentin (NEURONTIN) 100 mg capsule Take 1 Cap by mouth three (3) times daily. Qty: 30 Cap, Refills: 5 Associated Diagnoses: Peripheral neuropathy STOP taking these medications  
  
 insulin detemir U-100 (Levemir U-100 Insulin) 100 unit/mL injection Comments:  
Reason for Stopping:   
   
  
  
 
Pending Labs: 
None Follow-up (including scheduled tests): Follow-up Information Follow up With Specialties Details Why Contact Info [address] [facility] [address] Yif[place] Camargo [place] [address] [phone] Ottoniel Avelar MD Internal Medicine   [address] [facility] [address] [facility] [address] 
[phone] Junaid Shirley MD Nephrology   [address] [place], [place] [address] Floor [facility] [address] [phone] History of Present Illness: 
Per Dr Kendall King \"78 y.o. female with medical history of HTN, HFpEF, DM2, CKD3 with prior short term dialysis, COPD on 2 L NC with chronic hypoxic respiratory failure, COVID 19 December 2020 s/p trache / decanulated, pulmonary HTN, who is evaluated with tremors. She currently is a longterm resident at [facility]. Since her prior COVID illness 12,2020 she has had numerous complications and admissions. She was discharged to [facility] on 5-12-21 after a stay for encephalopathy, klebsiella UTI, workup of elevated LFTs, volume overload, hyperkalemia. She is pending outpatient EUS per GI on 6-2-21. Her losartan was stopped. She was resumed on oral lasix. Per daughter Cruz Powell at her bedside, she was in her baseline health but today was noted to have increased tremors and extremity jerking. She did have some generalized weakness and is essentially wheelchair bound since COVID with extremity jerking/ tremors and some mouth Tremors. Upon visiting today, Cruz Powell noted she seemed worse and had her sent to the ED. She has potassium 6.5 and UTI. \" 
 
Past Medical History: 
Past Medical History:  
Diagnosis Date  Acute cystitis without hematuria 1/30/2019  Acute pancreatitis 8/12/2019  CAD (coronary artery disease)  Diabetic ketoacidosis ([facility] 3/8/2020  DM2 (diabetes mellitus, type 2) ([facility]  Elevated liver function tests 8/8/2019  Encephalopathy 2/7/2021  Gout  Gram-negative bacteremia 8/9/2019  HLD (hyperlipidemia)  HTN (hypertension)  Peripheral neuropathy  Right lower quadrant abdominal pain 8/8/2019 Allergies: Allergies Allergen Reactions  Sulfa (Sulfonamide Antibiotics) Swelling Hospital Course: 
79 y/o AAF with a h/o HTN, HFpEF, DM2, CKD3 with prior short term dialysis, COPD on 2 L NC with chronic hypoxic respiratory failure, COVID-19 12/2020 s/p trach now decanulated, pHTN, who was admitted to our service on 5/30 with tremors. She currently is a longterm resident at [facility]. Since her prior COVID illness 12/2020 she has had numerous complications and admissions.  She was discharged to [facility] on 5-12-21 after a stay for encephalopathy, Klebsiella UTI, workup of elevated LFTs, volume overload, hyperkalemia. She is pending outpatient EUS per GI on 6-2-21 for biliary ductal dilation. Her losartan was stopped. She was resumed on oral lasix. She was at her baseline health but was noted to have increased tremors and extremity jerking. She did have some generalized weakness and is essentially wheelchair bound since COVID with extremity jerking/ tremors. Upon visiting on day of admission, daughter noted she seemed worse and had her sent to the ED. K was 6.5 and UA with UTI. She has received insulin/dextrose/ IVF bolus. EKG showed mildly peaked T waves. ARB held, Lokelma was given.  Patient was evaluated by Neuro and gabapentin held. Myoclonus resolved. K intermittent elevated still. 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 For her VRE UTI she completed course of zyvox. Nephrology was consulted, continued on lasix. Resolved hyperkalemia. Potassium on discharge 4.4. Mild increase in creatinine, so lasix was held. Hemodynamically stable on discharge. BMP on 6/14/21. Close follow up with nephrology. Procedures: 
None Discharge Day Information: 
Follow with PMD 
 
Diet: Renal, low potassium Activity: As tolerated Discharge Physical Exam: 
General: Alert, NAD HEENT: NC/AT, EOM are intact Neck: supple, no JVD Cardiovascular: RRR, S1, S2, no murmurs Respiratory: Lungs are clear, no wheezes or rales Abdomen: Soft, NT, ND Back: No CVA tenderness, no paraspinal tenderness Extremities: LE without pedal edema, no erythema Neuro: CN are intact, no focal deficits Skin: no rash or ulcers Psych: good mood and affect Recent Results (from the past 24 hour(s)) GLUCOSE, POC  Collection Time: 06/07/21  4:15 PM  
Result Value Ref Range Glucose (POC) 176 (H) 65 - 100 mg/dL Performed by [employer] GLUCOSE, POC Collection Time: 06/07/21  8:45 PM  
Result Value Ref Range Glucose (POC) 123 (H) 65 - 100 mg/dL Performed by [facility] METABOLIC PANEL, BASIC Collection Time: 06/08/21  4:40 AM  
Result Value Ref Range Sodium 142 136 - 145 mmol/L Potassium 4.4 3.5 - 5.1 mmol/L Chloride 108 (H) 98 - 107 mmol/L  
 CO2 27 21 - 32 mmol/L Anion gap 7 7 - 16 mmol/L Glucose 111 (H) 65 - 100 mg/dL BUN 40 (H) 8 - 23 MG/DL Creatinine 2.19 (H) 0.6 - 1.0 MG/DL  
 GFR est AA 28 (L) >60 ml/min/1.73m2 GFR est non-AA 23 (L) >60 ml/min/1.73m2 Calcium 9.3 8.3 - 10.4 MG/DL  
GLUCOSE, POC Collection Time: 06/08/21  7:32 AM  
Result Value Ref Range Glucose (POC) 134 (H) 65 - 100 mg/dL Performed by [employer] GLUCOSE, POC Collection Time: 06/08/21 11:44 AM  
Result Value Ref Range Glucose (POC) 241 (H) 65 - 100 mg/dL Performed by [employer] No orders to display Condition: Improved Disposition: STR Consultants During This Hospitalization: Neuro, Nephro, GI

## 2021-06-08 NOTE — PROGRESS NOTES
Discharge instructions/chart info given to transport, patient returning to room 202B at [facility]. Report given to Maria Isabel Contreras at [facility], An opportunity was given for questions. All medications were reviewed, and information was given on the new medications. IV removed, pt prepared for transport. Patient is alert and oriented, daughter just left bedside and is aware patient is leaving for [facility].

## 2021-06-08 NOTE — PROGRESS NOTES
Pt is expected to be discharged later today. Pt will transfer to [address] [place] [address] at 1400; room 202B. Facility aware. CM will give pts nurse number for report when d/c order is in. 
 
1317-Discharge order is in. Cristino Loving arriving at [phone]; facility and pts nurse aware and given number for report. Care Management Interventions PCP Verified by CM: Yes Mode of Transport at Discharge: [facility] Transport Time of Discharge: 1400 Transition of Care Consult (CM Consult): SNF, Discharge Planning (LTC resident at [facility]) Discharge Durable Medical Equipment: No 
Physical Therapy Consult: Yes Occupational Therapy Consult: Yes Speech Therapy Consult: No 
Current Support Network: [facility] [place] Confirm Follow Up Transport: Family The Plan for Transition of Care is Related to the Following Treatment Goals : Return to her baseline The Patient and/or Patient Representative was Provided with a Choice of Provider and Agrees with the Discharge Plan?: Yes Name of the Patient Representative Who was Provided with a Choice of Provider and Agrees with the Discharge Plan: Patient Freedom of Choice List was Provided with Basic Dialogue that Supports the Patient's Individualized Plan of Care/Goals, Treatment Preferences and Shares the Quality Data Associated with the Providers?: Yes [employer] Information Provided?: No 
Discharge Location Discharge Placement: Skilled nursing facility

## 2021-06-08 NOTE — PROGRESS NOTES
[facility] Subjective: Hyperkalemia, CKD3b  No new complaints Review of Systems -  
General ROS: negative for - fever, chills Respiratory ROS: no SOB, cough, WHITE Cardiovascular ROS: no CP, palpitations Gastrointestinal ROS: no N&V, abdominal pain, diarrhea Genito-Urinary ROS: no difficulty voiding, dysuria Neurological ROS: no seizures, focal weekness Objective: 
 
Vitals:  
 06/08/21 1278 06/08/21 2335 06/08/21 1895 06/08/21 8096 BP: (!) 176/72 (!) 154/76 (!) 162/75 Pulse: 63  60 Resp: 18  18 Temp: 98.1 °F (36.7 °C)  97.4 °F (36.3 °C) SpO2: 100%  100% 98% Weight: 68 kg (150 lb) Height:      
 
 
PE 
Gen: in no acute distress CV:reg rate Chest:clear Abd: soft Ext/Access: no edema [facility] Recent Labs  
  06/06/21 
0448 WBC 5.1 HGB 8.5* HCT 26.8*  
 Recent Labs  
  06/08/21 
0440 06/07/21 
0400 06/06/21 
0448  140 141  
K 4.4 4.7 4.8  
* 106 106 CO2 27 30 30 * 137* 149* BUN 40* 36* 35* CREA 2.19* 2.02* 1.97* PHOS  --  4.5* 4.4*  
CA 9.3 9.1 9.4 ALB  --  3.2 3.1* Radiology A/P:  
Patient Active Problem List  
Diagnosis Code  Diabetes mellitus with hyperosmolarity without hyperglycemic hyperosmolar nonketotic coma ([facility] E11.00  Coronary artery disease involving coronary bypass graft of native heart without angina pectoris I25.810  
 Uncontrolled type 2 diabetes mellitus with hyperglycemia (HCC) E11.65  
 Essential hypertension I10  
 HLD (hyperlipidemia) E78.5  Chronic kidney disease, stage III (moderate) (HCC) N18.30  
 Osteopenia M85.80  Vitamin D deficiency E55.9  Gastroesophageal reflux disease without esophagitis K21.9  Other allergic rhinitis J30.89  Peripheral neuropathy G62.9  
 Primary osteoarthritis involving multiple joints M89.49  
 Insomnia G47.00  RLS (restless legs syndrome) C56.54  
 Metabolic encephalopathy I78.64  
 Anemia D64.9  Generalized weakness R53.1  Decreased oral intake R63.8  Acute on chronic renal failure (HCC) N17.9, N18.9  Noncompliance Z91.19  
 Hypoalbuminemia due to protein-calorie malnutrition ([facility] E88.09, E46  
 COVID-19 U07.1  Acute respiratory distress syndrome (ARDS) due to severe acute respiratory syndrome coronavirus 2 (SARS-CoV-2) ([facility]) U07.1, J80  Acute hypoxemic respiratory failure ([facility]) J96.01  
 Cardiac arrest ([facility]) I46.9  CHF exacerbation ([facility]) I50.9  Acute diastolic CHF (congestive heart failure) ([facility]) A53.34  
 Diastolic CHF, chronic ([facility]) I50.32  
 Pulmonary HTN ([facility]) I27.20  Hyperkalemia E87.5  UTI (urinary tract infection) N39.0  Chronic hypoxemic respiratory failure ([facility]) J96.11  
 Hypercapnemia R06.89  
 Abnormal LFTs R94.5  Constipation K59.00  Myoclonus G25.3  History of COVID-19 Z86.16 I/P  
CKD3b/4  worse, non oliguric  
on IVF. Hold Lasix Hyperkalemia -resolved VRE UTI : Nikolay Powell MD

## 2021-06-24 PROBLEM — J96.02 ACUTE HYPERCAPNIC RESPIRATORY FAILURE (HCC): Status: ACTIVE | Noted: 2021-01-01

## 2021-06-24 NOTE — INTERVAL H&P NOTE
Update History & Physical    The Patient's History and Physical of earlier today was reviewed with the patient and I examined the patient. There was no change. The surgical site was confirmed by the patient and me. Plan:  The risk, benefits, expected outcome, and alternative to the recommended procedure have been discussed with the patient. Patient understands and wants to proceed with the procedure.     Electronically signed by Colonel Aby MD on 6/24/2021 at 9:29 AM

## 2021-06-24 NOTE — ED TRIAGE NOTES
Pt BIB EMS from [facility] for complaint of difficulty breathing. Pt had audible wheezing with use of accessory muscles upon EMS arrival. 2 duo nebs given by facility today, last one 45 min pta of EMS. Per daughter at bedside, XR done at facility 1 hr PTA that suggest pneumonia.     En route:   5m albuterol  125mg solu-medrol

## 2021-06-24 NOTE — ACP (ADVANCE CARE PLANNING)
Advance Care Planning     General Advance Care Planning (ACP) Conversation      Date of Conversation: 6/23/2021      Healthcare Decision Maker:     Primary Decision Maker: Raudel Chowdary - [phone]  Click here to complete [address] including selection of the Healthcare Decision Maker Relationship (ie \"Primary\")  Today we documented Decision Maker(s) consistent with ACP documents on file. Content/Action Overview:   LW/HCPOA placed on file in paper chart after being faxed from [facility]. Alyssa Garzon, pt's daughter is listed agent for her if she can not make decisions for herself. Full code per MD orders at present.        Length of Voluntary ACP Conversation in minutes:  25 minutes    Galilea Sage RN

## 2021-06-24 NOTE — ED NOTES
TRANSFER - OUT REPORT:    Verbal report given to Luma(name) on Michaela Madrid  being transferred to CCU(unit) for routine progression of care       Report consisted of patients Situation, Background, Assessment and   Recommendations(SBAR). Information from the following report(s) SBAR was reviewed with the receiving nurse. Lines:   Peripheral IV 06/23/21 Posterior;Proximal;Left Forearm (Active)        Opportunity for questions and clarification was provided.       Patient transported with:   Registered Nurse

## 2021-06-24 NOTE — PROGRESS NOTES
Bedside and verbal shift change report received from  [address] [place] [address] (offgoing nurse). Report included the following information SBAR, Kardex, ED Summary, Intake/Output, MAR, Recent Results, Cardiac Rhythm nsr and Alarm Parameters . Dual skin assessment completed at bedside: excoriation in perineal area (list pertinent skin assessment findings)    Dual verification of gtts completed (name of gtts verified): No drips at this time.

## 2021-06-24 NOTE — PROGRESS NOTES
Mrs. Ele Snow was intubated from 12/30-1/19/21 and underwent perc trach on 1/19. She now has stridor. Plan to perform bronchoscopy at bedside and spoke with daughter Leonard Torres who agrees to plans.   Tommie Gaviria MD

## 2021-06-24 NOTE — H&P
H&P HPI NOTE    Funmilayo Adhikari    6/24/2021    Date of Admission:  6/23/2021    The patient's chart is reviewed and the patient is discussed with the staff. Subjective:     Patient is a 78 y.o.  female seen and evaluated at the request of  ED staff. Patient is 78 Covid survivor -American female with her daughter she had tracheostomy back in December for acute respiratory failure with arts for 50 days last night patient started having increased work of breathing cough and shortness of breath stridor came to the ED in the ED patient had mild to moderate respiratory distress with stridor I listen to her she stridorous inspiration the neck area such as CAT scan is pending no fever chills rigors no nausea vomiting diarrhea. Patient had hoarseness of voice tube. She had a history of chronic insufficiency stage IV. Review of Systems  Review of systems not obtained due to patient factors.     Patient Active Problem List   Diagnosis Code    Diabetes mellitus with hyperosmolarity without hyperglycemic hyperosmolar nonketotic coma (HCC) E11.00    Coronary artery disease involving coronary bypass graft of native heart without angina pectoris I25.810    Uncontrolled type 2 diabetes mellitus with hyperglycemia ([facility] [address] E11.65    Essential hypertension I10    HLD (hyperlipidemia) E78.5    Chronic kidney disease, stage III (moderate) (HCC) N18.30    Osteopenia M85.80    Vitamin D deficiency E55.9    Gastroesophageal reflux disease without esophagitis K21.9    Other allergic rhinitis J30.89    Peripheral neuropathy G62.9    Primary osteoarthritis involving multiple joints M89.49    Insomnia G47.00    RLS (restless legs syndrome) Z69.99    Metabolic encephalopathy K78.35    Anemia D64.9    Generalized weakness R53.1    Decreased oral intake R63.8    Acute on chronic renal failure (HCC) N17.9, N18.9    Noncompliance Z91.19    Hypoalbuminemia due to protein-calorie malnutrition ([facility] E88.09, E46    COVID-19 U07.1    Acute respiratory distress syndrome (ARDS) due to severe acute respiratory syndrome coronavirus 2 (SARS-CoV-2) ([facility]) U07.1, J80    Acute hypoxemic respiratory failure ([facility]) J96.01    Cardiac arrest ([facility]) I46.9    CHF exacerbation ([facility]) I50.9    Acute diastolic CHF (congestive heart failure) ([facility]) Z44.54    Diastolic CHF, chronic ([facility]) I50.32    Pulmonary HTN ([facility]) I27.20    Hyperkalemia E87.5    UTI (urinary tract infection) N39.0    Chronic hypoxemic respiratory failure ([facility]) J96.11    Hypercapnemia R06.89    Abnormal LFTs R94.5    Constipation K59.00    Myoclonus G25.3    History of COVID-19 Z86.16    Acute hypercapnic respiratory failure ([facility] J96.02       Home DME company . Prior to Admission Medications   Prescriptions Last Dose Informant Patient Reported? Taking? Blood-Glucose Meter (ONETOUCH ULTRAMINI) monitoring kit   No No   Sig: Use as directed   Insulin Needles, Disposable, 31 gauge x 5/16\" ndle   No No   Sig: by SubCUTAneous route Before breakfast, lunch, dinner and at bedtime. Lancets (ONETOUCH ULTRASOFT LANCETS) misc   No No   Sig: Test 4 times daily as directed   Omeprazole delayed release (PRILOSEC D/R) 20 mg tablet   Yes No   Sig: Take 20 mg by mouth daily. albuterol-ipratropium (DUO-NEB) 2.5 mg-0.5 mg/3 ml nebu   No No   Sig: 3 mL by Nebulization route every four (4) hours as needed for Wheezing or Shortness of Breath. ascorbic acid, vitamin C, (Vitamin C) 500 mg tablet   Yes No   Sig: Take  by mouth every other day. aspirin delayed-release 81 mg tablet   Yes No   Sig: Take  by mouth daily. clopidogrel (PLAVIX) 75 mg tab   Yes No   Sig: Take 75 mg by mouth. ferrous sulfate 325 mg (65 mg iron) tablet   Yes No   Sig: Take  by mouth every other day.    furosemide (LASIX) 20 mg tablet   No No   Sig: Take 1 Tablet by mouth every other day.   gabapentin (NEURONTIN) 100 mg capsule   No No   Sig: Take 1 Cap by mouth three (3) times daily. glucose blood VI test strips (ONETOUCH ULTRA TEST) strip   No No   Sig: Test 4 times daily as directed   insulin lispro (HUMALOG) 100 unit/mL injection   No No   Si-10 Units by SubCUTAneous route Before breakfast, lunch, and dinner. For Blood Sugar (mg/dL) of:     Less than 150 =   0 units           150 -199 =   2 units  200 -249 =   4 units  250 -299 =   6 units  300 -349 =   8 units  350 and above = 10 units   metoprolol succinate (TOPROL-XL) 25 mg XL tablet   No No   Sig: Take 0.5 Tabs by mouth daily. rOPINIRole (REQUIP) 0.5 mg tablet   No No   Sig: Take 1 Tab by mouth nightly as needed.       Facility-Administered Medications: None       Past Medical History:   Diagnosis Date    Acute cystitis without hematuria 2019    Acute pancreatitis 2019    CAD (coronary artery disease)     Diabetic ketoacidosis ([address] 3/8/2020    DM2 (diabetes mellitus, type 2) (HCC)     Elevated liver function tests 2019    Encephalopathy 2021    Gout     Gram-negative bacteremia 2019    HLD (hyperlipidemia)     HTN (hypertension)     Peripheral neuropathy     Right lower quadrant abdominal pain 2019     Past Surgical History:   Procedure Laterality Date    HX CHOLECYSTECTOMY      jennifer in 1964    HX CORONARY ARTERY BYPASS GRAFT      x3    HX TUBAL LIGATION      IR INSERT NON TUNL CVC OVER 5 YRS  2021    MI CARDIAC SURG PROCEDURE UNLIST      stents x 3 2009     Social History     Socioeconomic History    Marital status: SINGLE     Spouse name: Not on file    Number of children: Not on file    Years of education: Not on file    Highest education level: Not on file   Occupational History    Occupation: retired    Tobacco Use    Smoking status: Never Smoker    Smokeless tobacco: Never Used   Substance and Sexual Activity    Alcohol use: Yes     Comment: socially    Drug use: No    Sexual activity: Not on file   Other Topics Concern  Not on file   Social History Narrative    Lives alone but has a caregiver who helps several hours per day, several days per week     Social Determinants of Health     Financial Resource Strain:     Difficulty of Paying Living Expenses:    Food Insecurity:     Worried About Running Out of Food in the Last Year:     [address] in the Last Year:    Transportation Needs:     Lack of Transportation (Medical):      Lack of Transportation (Non-Medical):    Physical Activity:     Days of Exercise per Week:     Minutes of Exercise per Session:    Stress:     Feeling of Stress :    Social Connections:     Frequency of Communication with Friends and Family:     Frequency of Social Gatherings with Friends and Family:     Attends [employer] Services:     Active Member of Clubs or Organizations:     Attends Club or Organization Meetings:     Marital Status:    Intimate Partner Violence:     Fear of Current or Ex-Partner:     Emotionally Abused:     Physically Abused:     Sexually Abused:      Family History   Problem Relation Age of Onset    Hypertension Mother     Cancer Mother     Diabetes Mother     Heart Disease Mother      Allergies   Allergen Reactions    Sulfa (Sulfonamide Antibiotics) Swelling       Current Facility-Administered Medications   Medication Dose Route Frequency    sodium chloride (NS) flush 5-40 mL  5-40 mL IntraVENous Q8H    sodium chloride (NS) flush 5-40 mL  5-40 mL IntraVENous PRN    acetaminophen (TYLENOL) tablet 650 mg  650 mg Oral Q6H PRN    Or    acetaminophen (TYLENOL) suppository 650 mg  650 mg Rectal Q6H PRN    polyethylene glycol (MIRALAX) packet 17 g  17 g Oral DAILY PRN    ondansetron (ZOFRAN ODT) tablet 4 mg  4 mg Oral Q8H PRN    Or    ondansetron (ZOFRAN) injection 4 mg  4 mg IntraVENous Q6H PRN    enoxaparin (LOVENOX) injection 40 mg  40 mg SubCUTAneous DAILY    methylPREDNISolone (PF) (SOLU-MEDROL) injection 40 mg  40 mg IntraVENous Q6H    budesonide (PULMICORT) 500 mcg/2 ml nebulizer suspension  500 mcg Nebulization BID RT    albuterol-ipratropium (DUO-NEB) 2.5 MG-0.5 MG/3 ML  3 mL Nebulization Q4H RT    furosemide (LASIX) injection 40 mg  40 mg IntraVENous ONCE    sodium zirconium cyclosilicate (LOKELMA) powder packet 15 g  15 g Oral NOW    albuterol-ipratropium (DUO-NEB) 2.5 MG-0.5 MG/3 ML  3 mL Nebulization NOW    sodium chloride (NS) flush 5-10 mL  5-10 mL IntraVENous Q8H    sodium chloride (NS) flush 5-10 mL  5-10 mL IntraVENous PRN    magnesium sulfate 2 g/50 ml IVPB (premix or compounded)  2 g IntraVENous NOW     Current Outpatient Medications   Medication Sig    albuterol-ipratropium (DUO-NEB) 2.5 mg-0.5 mg/3 ml nebu 3 mL by Nebulization route every four (4) hours as needed for Wheezing or Shortness of Breath.  insulin lispro (HUMALOG) 100 unit/mL injection 0-10 Units by SubCUTAneous route Before breakfast, lunch, and dinner. For Blood Sugar (mg/dL) of:     Less than 150 =   0 units           150 -199 =   2 units  200 -249 =   4 units  250 -299 =   6 units  300 -349 =   8 units  350 and above = 10 units    furosemide (LASIX) 20 mg tablet Take 1 Tablet by mouth every other day.  metoprolol succinate (TOPROL-XL) 25 mg XL tablet Take 0.5 Tabs by mouth daily.  ferrous sulfate 325 mg (65 mg iron) tablet Take  by mouth every other day.  Omeprazole delayed release (PRILOSEC D/R) 20 mg tablet Take 20 mg by mouth daily.  ascorbic acid, vitamin C, (Vitamin C) 500 mg tablet Take  by mouth every other day.  aspirin delayed-release 81 mg tablet Take  by mouth daily.  Insulin Needles, Disposable, 31 gauge x 5/16\" ndle by SubCUTAneous route Before breakfast, lunch, dinner and at bedtime.  clopidogrel (PLAVIX) 75 mg tab Take 75 mg by mouth.     Blood-Glucose Meter (ONETOUCH ULTRAMINI) monitoring kit Use as directed    glucose blood VI test strips (ONETOUCH ULTRA TEST) strip Test 4 times daily as directed    Lancets (ONETOUCH ULTRASOFT LANCETS) misc Test 4 times daily as directed    gabapentin (NEURONTIN) 100 mg capsule Take 1 Cap by mouth three (3) times daily.  rOPINIRole (REQUIP) 0.5 mg tablet Take 1 Tab by mouth nightly as needed. Objective:     Vitals:    06/23/21 2235 06/23/21 2341   BP: (!) 175/80 (!) 157/115   Pulse: 70 73   Resp: 16    SpO2: 100%    Weight: 68 kg (150 lb)    Height: 5' 5\" (1.651 m)        PHYSICAL EXAM     Constitutional: Mild to moderate acute distress from shortness of breath work of breathing  EENMT:  Sclera clear, pupils equal, oral mucosa moist  Respiratory: Inspiratory stridor with expiratory wheezing  Cardiovascular:  RRR without M,G,R  Gastrointestinal: soft and non-tender; with positive bowel sounds. Musculoskeletal: warm without cyanosis. There is no no lower extremity edema. Skin:  no jaundice or rashes, no wounds   Neurologic: Moving upper lower extremities without limitations. CXR:      CT Chest    Recent Labs     06/23/21  2318   WBC 8.2   HGB 8.5*   HCT 28.0*   *     Recent Labs     06/23/21  2318   *   K 5.8*   *   *   CO2 25   BUN 58*   CREA 1.81*   MG 2.6*   CA 8.8   ALB 3.2   TBILI 0.2   ALT 32     Recent Labs     06/23/21  2344   PHI 7.24*   PCO2I 58.0*   PO2I 96   HCO3I 24.9     No results for input(s): LCAD, LAC in the last 72 hours.     Cultures:  Blood x2  Urine-  Sputum-  Wound-  Pleural fluid  Abdominal fluid-    Inpat Anti-Infectives (From admission, onward)    None            Assessment:  (Medical Decision Making)     Hospital Problems  Date Reviewed: 4/19/2021        Codes Class Noted POA    Acute hypercapnic respiratory failure ([facility] ICD-10-CM: J96.02  ICD-9-CM: 518.81  6/24/2021 Unknown        Hyperkalemia ICD-10-CM: E87.5  ICD-9-CM: 276.7  5/5/2021 Yes        Insomnia (Chronic) ICD-10-CM: G47.00  ICD-9-CM: 780.52  6/15/2015 Yes              Plan:  (Medical Decision Making)     --Severe hypercapnic respiratory failure with stridor could be progressive stenosis CT neck soft tissues pending we will start steroids Pulmicort DuoNebs might need racemic epi might need to either bronchoscopy or dilatation if she has tracheal stenosis      --Retention CO2 30 work of breathing put on BiPAP steroids Lasix and assess accordingly      --Mildly elevated proBNP with chronic renal failure stage IV will give a dose of Lasix and assess accordingly    --DVT prophylaxis      --Hyperkalemia unknown etiology she has chronic renal failure stage IV and nephro diet and low potassium diet and give Lokelma 1 dose. --Critical care time spent the care of this patient is 48 minutes    More than 50% of the time documented was spent in face-to-face contact with the patient and in the care of the patient on the floor/unit where the patient is located. Thank you very much for this referral.  We appreciate the opportunity to participate in this patient's care. Will follow along with above stated plan.     Shamika Hayden MD

## 2021-06-24 NOTE — PROGRESS NOTES
TRANSFER - IN REPORT:    Verbal report received from Jomar(name) on Gema Varghese  being received from ED(unit) for routine progression of care      Report consisted of patients Situation, Background, Assessment and   Recommendations(SBAR). Information from the following report(s) SBAR, ED Summary and Recent Results was reviewed with the receiving nurse. Opportunity for questions and clarification was provided.

## 2021-06-24 NOTE — PROGRESS NOTES
Physician Progress Note      PATIENT:               Dolores Tubbs  CSN #:                  [id number]  :                       1940  ADMIT DATE:       2021 10:32 PM  [address] [place] DATE:  RESPONDING  PROVIDER #:        Pauly Rodriguez MD          QUERY TEXT:    Pt admitted with Severe Hypercapnic Respiratory Failure and noted BNP 2,605 with documentation of diastolic CHF. If possible, please document in progress notes and discharge summary if you are evaluating and/or treating any of the following: The medical record reflects the following:  Risk Factors: Respiratory Failure, COVID Dec 2020 requiring Vent and Trach, CAD  Clinical Indicators: - BNP 2,605; CXR- There are bilateral interstitial densities, similar to prior exam. No pneumothorax. Suggestion of small pleural effusion. Ashely Sida Ashely Sida Ashely Sida Heart is enlarged. Echo 2021 with EF 55-60%, Per H&P - \"Mildly elevated proBNP with chronic renal failure stage IV will give a dose of Lasix\". Treatment: Lasix IV, BiPaP, Pulmonary Consult, Bronchoscopy, Oximetry Monitoring, ABG's, Daily Labs, Daily Vitals, Weigh Pt. Options provided:  -- Acute on Chronic Diastolic CHF/HFpEF  -- Acute Diastolic CHF/HFpEF  -- Chronic Diastolic CHF/HFpEF  -- Other - I will add my own diagnosis  -- Disagree - Not applicable / Not valid  -- Disagree - Clinically unable to determine / Unknown  -- Refer to Clinical Documentation Reviewer    PROVIDER RESPONSE TEXT:    Provider is clinically unable to determine a response to this query.     Query created by: evelin mahoney on 2021 1:29 PM      Electronically signed by:  Pauly Rodriguez MD 2021 5:04 PM

## 2021-06-24 NOTE — PROGRESS NOTES
Initial visit made to patient and a prayer was provided. She appeared to be resting.       Gabriella Astorga, [address] [facility], [facility]

## 2021-06-24 NOTE — ED PROVIDER NOTES
58-year-old female patient presents from [facility] in [place] with reports of shortness of breath and wheezing. Staff at this facility noted change in patient's breathing today. She received breathing treatment prior to transport and again in route from EMS with some improvement. [facility] reports evidence of pneumonia on recent chest x-ray. Patient arrives with her daughter at bedside who states that patient was admitted and December 2020 secondary to COVID-19. She required mechanical ventilation and subsequent tracheostomy. The since been removed. Patient arrives with audible wheezing with sounds like stridor. Her voice is hoarse which is chronic per family at bedside. She is controlling secretions without difficulty.            Past Medical History:   Diagnosis Date    Acute cystitis without hematuria 1/30/2019    Acute pancreatitis 8/12/2019    CAD (coronary artery disease)     Diabetic ketoacidosis ([facility] 3/8/2020    DM2 (diabetes mellitus, type 2) (HCC)     Elevated liver function tests 8/8/2019    Encephalopathy 2/7/2021    Gout     Gram-negative bacteremia 8/9/2019    HLD (hyperlipidemia)     HTN (hypertension)     Peripheral neuropathy     Right lower quadrant abdominal pain 8/8/2019       Past Surgical History:   Procedure Laterality Date    HX CHOLECYSTECTOMY      jennifer in 1964    HX CORONARY ARTERY BYPASS GRAFT      x3    HX TUBAL LIGATION      IR INSERT NON TUNL CVC OVER 5 YRS  1/11/2021    MO CARDIAC SURG PROCEDURE UNLIST      stents x 3 2009         Family History:   Problem Relation Age of Onset    Hypertension Mother     Cancer Mother     Diabetes Mother     Heart Disease Mother        Social History     Socioeconomic History    Marital status: SINGLE     Spouse name: Not on file    Number of children: Not on file    Years of education: Not on file    Highest education level: Not on file   Occupational History    Occupation: retired    Tobacco Use    Smoking status: Never Smoker    Smokeless tobacco: Never Used   Substance and Sexual Activity    Alcohol use: Yes     Comment: socially    Drug use: No    Sexual activity: Not on file   Other Topics Concern    Not on file   Social History Narrative    Lives alone but has a caregiver who helps several hours per day, several days per week     Social Determinants of Health     Financial Resource Strain:     Difficulty of Paying Living Expenses:    Food Insecurity:     Worried About Running Out of Food in the Last Year:     [address] in the Last Year:    Transportation Needs:     Lack of Transportation (Medical):  Lack of Transportation (Non-Medical):    Physical Activity:     Days of Exercise per Week:     Minutes of Exercise per Session:    Stress:     Feeling of Stress :    Social Connections:     Frequency of Communication with Friends and Family:     Frequency of Social Gatherings with Friends and Family:     Attends Christianity Services:     Active Member of Clubs or Organizations:     Attends Club or Organization Meetings:     Marital Status:    Intimate Partner Violence:     Fear of Current or Ex-Partner:     Emotionally Abused:     Physically Abused:     Sexually Abused: ALLERGIES: Sulfa (sulfonamide antibiotics)    Review of Systems   Constitutional: Negative for chills, diaphoresis and fever. HENT: Negative for congestion, sneezing and sore throat. Eyes: Negative for visual disturbance. Respiratory: Positive for cough. Negative for chest tightness, shortness of breath and wheezing. Cardiovascular: Negative for chest pain and leg swelling. Gastrointestinal: Negative for abdominal pain, blood in stool, diarrhea, nausea and vomiting. Endocrine: Negative for polyuria. Genitourinary: Negative for difficulty urinating, dysuria, flank pain, hematuria and urgency. Musculoskeletal: Negative for back pain, myalgias, neck pain and neck stiffness.    Skin: Negative for color change and rash. Neurological: Negative for dizziness, syncope, speech difficulty, weakness, light-headedness, numbness and headaches. Psychiatric/Behavioral: Negative for behavioral problems. All other systems reviewed and are negative. Vitals:    06/23/21 2235   BP: (!) 175/80   Pulse: 70   Resp: 16   SpO2: 100%   Weight: 68 kg (150 lb)   Height: 5' 5\" (1.651 m)            Physical Exam  Vitals and nursing note reviewed. Constitutional:       General: She is not in acute distress. Appearance: She is well-developed. She is not diaphoretic. Comments: Alert and oriented to person place and time. No acute distress, speaks in clear, fluid sentences. HENT:      Head: Normocephalic and atraumatic. Right Ear: External ear normal.      Left Ear: External ear normal.      Nose: Nose normal.   Eyes:      Pupils: Pupils are equal, round, and reactive to light. Neck:      Comments: Tracheostomy scar visible on exam.  There is audible upper airway noise with auscultation, mild stridor. Patient speaks in hoarse response. Cardiovascular:      Rate and Rhythm: Normal rate and regular rhythm. Heart sounds: Normal heart sounds. No murmur heard. No friction rub. No gallop. Pulmonary:      Effort: Tachypnea and accessory muscle usage present. No respiratory distress. Breath sounds: Transmitted upper airway sounds present. No stridor. Decreased breath sounds and wheezing present. No rhonchi or rales. Comments: Some faint wheezing is noted, transmission of upper airway noise. Chest:      Chest wall: No tenderness. Abdominal:      General: There is no distension. Palpations: Abdomen is soft. There is no mass. Tenderness: There is no abdominal tenderness. There is no guarding or rebound. Hernia: No hernia is present. Musculoskeletal:         General: No tenderness or deformity. Normal range of motion. Cervical back: Normal range of motion.    Skin:     General: Skin is warm and dry. Neurological:      Mental Status: She is alert and oriented to person, place, and time. Cranial Nerves: No cranial nerve deficit. MDM  Number of Diagnoses or Management Options  Diagnosis management comments: On initial exam I feel that patient is exhibiting mild stridor. She is able to speak clearly and control secretions without difficulty but she does endorse a sore throat. Will attempt racemic epi nebulizer and obtain soft tissue neck x-ray. Patient would be at risk for tracheal stenosis given history of prolonged intubation and subsequent tracheostomy placement.        Amount and/or Complexity of Data Reviewed  Clinical lab tests: ordered and reviewed  Tests in the radiology section of CPT®: ordered and reviewed  Tests in the medicine section of CPT®: ordered and reviewed  Independent visualization of images, tracings, or specimens: yes    Risk of Complications, Morbidity, and/or Mortality  Presenting problems: moderate  Diagnostic procedures: low  Management options: moderate    Patient Progress  Patient progress: stable         Procedures

## 2021-06-24 NOTE — PROCEDURES
PROCEDURE  Bronchoscopy with airway inspection    INDICATION   Stridor    EQUIPMENT:  Olympus Q 180 Bronchoscope. ANESTHESIA    Concious sedation with: Fentanyl 75 mcg IV; [id number] IV; Lidocaine 120 mg to tracheo-bronchial tree and vocal cords; Please see ICU/CCU/CTICU medication record. AIRWAY INSPECTION    After obtaining informed consent, using a bite block an Olympus Q180 video bronchoscope was  introduced into the trachea through the bilateral vocal cords, without complication. RIGHT    LOCATION NORM/ABNORM DESCRIPTION   VOCAL CORDS abNL Very limited movement of bilateral vocal cords noted   TRACHEA NL No tracheal or subglottic stenosis noted   SONALI NL    RMSB NL    RUL NL    BI NL    RML NL    SUP SEGM RLL NL    MED BASAL NL    ANTERIOR BASAL NL    LATERAL BASAL NL    POSTERIOR BASAL NL           LEFT    LOCATION NORMAL/ABNORMAL TYPE   LMSB NL    RACHANA NL    LINGULA NL    SUPERIOR DIVISION NL    SUPERIOR SEG LLL NL    ISIDRO-MEDIAL LLL NL    LATERAL LLL NL    POSTERIOR LLL NL        The procedure was completed  without complication and the patient tolerated the procedure well. EBL: none    Recommendations:  Consider tracheostomy vs NIV for bilateral vocal cord paralysis vs paresis.     Ray Zacarias MD

## 2021-06-24 NOTE — PROGRESS NOTES
Chart reviewed and pt discussed in am IDR. Pt currently on BIPAP. Daughter, Nirmala Giordano, just left. Spoke to her by phone. Confirms demographics. [facility] She would like for her to return there at d/c. Nirmala Giordano states she completed HCPOA with  and hospital  at last d/c. Do not see on file. Call to [facility] to ask them for copy, as Nirmala Giordano states they have on file as well. Nirmala Giordano aware CM to follow for d/c needs/POC when stable. Care Management Interventions  PCP Verified by CM: Yes  Mode of Transport at Discharge:  Other (see comment)  Transition of Care Consult (CM Consult): SNF, Discharge Planning (Claudia)  Partner SNF: No  Current Support Network: Nursing Facility  Confirm Follow Up Transport: Other (see comment)  Freedom of Choice List was Provided with Basic Dialogue that Supports the Patient's Individualized Plan of Care/Goals, Treatment Preferences and Shares the Quality Data Associated with the Providers?: Yes  [address] Provided?:  ROSSY [facility] VÍCTOR [facility])  Discharge Location  Discharge Placement: Unable to determine at this time

## 2021-06-24 NOTE — PROGRESS NOTES
Skin assessed with Viraj Ramírez RN completed. Areas of excoriation to juan/groin area, EPC cream applied, multiple areas scarring. Allevyn applied to sacrum.

## 2021-06-24 NOTE — PROGRESS NOTES
Spoke with Kim George at [facility]. States they do have HCPOA on file and he will send to us. Also, ask for referral to [facility] be sent and they will follow for d/c POC. Sent through [address] [place] [address].

## 2021-06-25 PROBLEM — J38.00 VOCAL CORD PARESIS: Status: ACTIVE | Noted: 2021-01-01

## 2021-06-25 PROBLEM — R06.1 STRIDOR: Status: ACTIVE | Noted: 2021-01-01

## 2021-06-25 NOTE — PROGRESS NOTES
Bedside and verbal shift change report received from  Donovan Kong [facility] (offgoing nurse). Report included the following information SBAR, Kardex, MAR, Recent Results and Cardiac Rhythm NSR.      Dual skin assessment completed at bedside: no skin breakdown noted (list pertinent skin assessment findings)    Dual verification of gtts completed (name of gtts verified): N/A

## 2021-06-25 NOTE — PROGRESS NOTES
Pharmacokinetic Consult to Pharmacist    Calli Skyler is a 78 y.o. female being treated with vancomycin and ceftriaxone. Height: 5' 5\" (165.1 cm)  Weight: 68.7 kg (151 lb 7.3 oz)  Lab Results   Component Value Date/Time    BUN 68 (H) 06/25/2021 05:10 AM    Creatinine 1.96 (H) 06/25/2021 05:10 AM    WBC 8.9 06/25/2021 05:10 AM    Procalcitonin <0.05 05/05/2021 11:34 AM    Lactic acid 0.7 05/30/2021 07:06 PM    Lactic Acid (POC) 1.55 09/16/2019 11:40 AM      Estimated Creatinine Clearance: 21.2 mL/min (A) (based on SCr of 1.96 mg/dL (H)). Day 1 of vancomycin. Goal trough is 15-20. Vancomycin dose initiated at 1250 mg IV x 1. Will dose intermittently for now given renal insufficiency. Levels will be ordered as clinically indicated. Pharmacy will continue to follow. Please call with any questions.     Thank you,  Guero Cevallos, PharmD, [facility]  Clinical Pharmacist  [phone]

## 2021-06-25 NOTE — PROGRESS NOTES
[facility] called to clarify the date patient was decanulated. Patient decanulated March 12th @ 240 PM by Jairo Chapin. Family states patient did not have vocal abnormalities post decanulation.

## 2021-06-25 NOTE — PROGRESS NOTES
Bedside shift report received from [facility].  Patient resting comfortably in bed on 2 L NC.

## 2021-06-25 NOTE — CONSULTS
HPI:  Luis Matos is a 78 y.o. female seen for a consultation from No ref. provider found for Shortness of Breath. Patient seen as a inpatient consultation request for stridor and suspicion of vocal cord paralysis. She has a very complicated unfortunate history of significant COVID-19 infection with respiratory failure presenting in December 2020 for which she was intubated for approximately 3 weeks prior to having percutaneous tracheostomy placed. She was what appears to be nonresponsive for some time at least several weeks to months and then eventually she was weaned from ventilation and discharged to [facility]. She was intubated for approximately 2 to 3 months prior to extubation and now has been extubated for approximately 2 to 3 months and has been doing relatively okay from a respiratory standpoint. She uses chronic oxygen via nasal cannula on low setting and has not had episodes of hypoxia for the last couple months. Her daughter states that she is noted that mom's voice to be relatively weaker and more hoarse over the last couple months which seems to have progressed. She is now admitted for a relatively acute stridor with admission diagnosis of hypercapnic respiratory failure. She does have a suspicion of sleep apnea which could explain her increased CO2 levels. The audible stridor is new this week and her voice has been even more fatigued than it typically is. She denies any history of smoking or cancer. She denies any bleeding from the mouth or nose. CT imaging was performed at admission which was suspicious for some evidence of subglottic edema and her follow-up bronchoscopy by the ICU team was unremarkable at the level of the trachea but with some suspicion of vocal cord paralysis. She is wheelchair-bound and therefore cannot assess for shortness of breath during activities of daily living.     Past Medical History, Past Surgical History, Family history, Social History, and Medications were all reviewed with the patient today and updated as necessary.      Allergies   Allergen Reactions    Sulfa (Sulfonamide Antibiotics) Swelling       Patient Active Problem List   Diagnosis Code    Diabetes mellitus with hyperosmolarity without hyperglycemic hyperosmolar nonketotic coma ([facility]) E11.00    Coronary artery disease involving coronary bypass graft of native heart without angina pectoris I25.810    Uncontrolled type 2 diabetes mellitus with hyperglycemia ([facility]) E11.65    Essential hypertension I10    HLD (hyperlipidemia) E78.5    Chronic kidney disease, stage III (moderate) ([facility]) N18.30    Osteopenia M85.80    Vitamin D deficiency E55.9    Gastroesophageal reflux disease without esophagitis K21.9    Other allergic rhinitis J30.89    Peripheral neuropathy G62.9    Primary osteoarthritis involving multiple joints M89.49    Insomnia G47.00    RLS (restless legs syndrome) H60.04    Metabolic encephalopathy B51.85    Anemia D64.9    Generalized weakness R53.1    Decreased oral intake R63.8    Acute on chronic renal failure ([facility]) N17.9, N18.9    Noncompliance Z91.19    Hypoalbuminemia due to protein-calorie malnutrition ([facility]) E88.09, E46    COVID-19 U07.1    Acute respiratory distress syndrome (ARDS) due to severe acute respiratory syndrome coronavirus 2 (SARS-CoV-2) ([facility]) U07.1, J80    Acute hypoxemic respiratory failure ([facility]) J96.01    Cardiac arrest ([facility]) I46.9    CHF exacerbation ([facility]) I50.9    Acute diastolic CHF (congestive heart failure) ([facility]) I68.99    Diastolic CHF, chronic ([facility]) I50.32    Pulmonary HTN ([facility]) I27.20    Hyperkalemia E87.5    UTI (urinary tract infection) N39.0    Chronic hypoxemic respiratory failure ([facility]) J96.11    Hypercapnemia R06.89    Abnormal LFTs R94.5    Constipation K59.00    Myoclonus G25.3    History of COVID-19 Z86.16    Acute hypercapnic respiratory failure ([facility]) J96.02    Stridor R06.1    Vocal cord paresis J38.00       Current Facility-Administered Medications   Medication Dose Route Frequency    nystatin (MYCOSTATIN) 100,000 unit/gram powder   Topical PRN    cefTRIAXone (ROCEPHIN) 1 g in 0.9% sodium chloride (MBP/ADV) 50 mL MBP  1 g IntraVENous Q24H    insulin lispro (HUMALOG) injection   SubCUTAneous AC&HS    linezolid in dextrose 5% (ZYVOX) IVPB premix in D5W 600 mg  600 mg IntraVENous Q12H    sodium chloride (OCEAN) 0.65 % nasal squeeze bottle 2 Spray  2 Spray Both Nostrils Q2H PRN    sodium chloride (NS) flush 5-40 mL  5-40 mL IntraVENous PRN    acetaminophen (TYLENOL) tablet 650 mg  650 mg Oral Q6H PRN    Or    acetaminophen (TYLENOL) suppository 650 mg  650 mg Rectal Q6H PRN    polyethylene glycol (MIRALAX) packet 17 g  17 g Oral DAILY PRN    ondansetron (ZOFRAN ODT) tablet 4 mg  4 mg Oral Q8H PRN    Or    ondansetron (ZOFRAN) injection 4 mg  4 mg IntraVENous Q6H PRN    budesonide (PULMICORT) 500 mcg/2 ml nebulizer suspension  500 mcg Nebulization BID RT    albuterol-ipratropium (DUO-NEB) 2.5 MG-0.5 MG/3 ML  3 mL Nebulization Q4H RT    heparin (porcine) injection 5,000 Units  5,000 Units SubCUTAneous Q12H    methylPREDNISolone (PF) (SOLU-MEDROL) injection 40 mg  40 mg IntraVENous Q6H    alcohol 62% (NOZIN) nasal  1 Ampule  1 Ampule Topical Q12H    sodium chloride (NS) flush 5-10 mL  5-10 mL IntraVENous Q8H    sodium chloride (NS) flush 5-10 mL  5-10 mL IntraVENous PRN       Past Medical History:   Diagnosis Date    Acute cystitis without hematuria 1/30/2019    Acute pancreatitis 8/12/2019    CAD (coronary artery disease)     Diabetic ketoacidosis ([facility] [address] 3/8/2020    DM2 (diabetes mellitus, type 2) (HCC)     Elevated liver function tests 8/8/2019    Encephalopathy 2/7/2021    Gout     Gram-negative bacteremia 8/9/2019    HLD (hyperlipidemia)     HTN (hypertension)     Peripheral neuropathy     Right lower quadrant abdominal pain 8/8/2019       Past Surgical History:   Procedure Laterality Date  HX CHOLECYSTECTOMY      jennifer in 1964    HX CORONARY ARTERY BYPASS GRAFT      x3    HX TUBAL LIGATION      IR INSERT NON TUNL CVC OVER 5 YRS  1/11/2021    NV CARDIAC SURG PROCEDURE UNLIST      stents x 3 2009       Social History     Tobacco Use    Smoking status: Never Smoker    Smokeless tobacco: Never Used   Substance Use Topics    Alcohol use: Yes     Comment: socially       Family History   Problem Relation Age of Onset    Hypertension Mother     Cancer Mother     Diabetes Mother     Heart Disease Mother         ROS:    Review of Systems   Constitutional: Negative for chills and fever. HENT: Positive for ear pain. Negative for hearing loss, nosebleeds and sore throat. Eyes: Negative for blurred vision. Respiratory: Positive for stridor. Negative for cough. Cardiovascular: Negative for chest pain. Gastrointestinal: Negative for heartburn. Genitourinary: Negative for dysuria. Musculoskeletal: Negative for myalgias. Skin: Negative for rash. Neurological: Negative for dizziness. Psychiatric/Behavioral: Negative for depression. PHYSICAL EXAM:    Visit Vitals  BP (!) 156/75   Pulse 83   Temp 97.6 °F (36.4 °C)   Resp 24   Ht 5' 5\" (1.651 m)   Wt 151 lb 7.3 oz (68.7 kg)   SpO2 100%   BMI 25.20 kg/m²       Physical Exam  Vitals and nursing note reviewed. Constitutional:       General: She is awake. She is not in acute distress. Appearance: Normal appearance. She is well-developed and normal weight. She is not ill-appearing or diaphoretic. HENT:      Head: Normocephalic and atraumatic. Jaw: No trismus, tenderness, swelling or pain on movement. Salivary Glands: Right salivary gland is not diffusely enlarged or tender. Left salivary gland is not diffusely enlarged or tender. Right Ear: Tympanic membrane, ear canal and external ear normal. No drainage, swelling or tenderness. No middle ear effusion. There is no impacted cerumen.  Tympanic membrane is not perforated. Left Ear: Tympanic membrane, ear canal and external ear normal. No drainage, swelling or tenderness. No middle ear effusion. There is no impacted cerumen. Tympanic membrane is not perforated. Nose: Nose normal. No nasal deformity, nasal tenderness, mucosal edema, congestion or rhinorrhea. Right Nostril: No epistaxis, septal hematoma or occlusion. Left Nostril: No epistaxis, septal hematoma or occlusion. Mouth/Throat:      Lips: No lesions. Mouth: Mucous membranes are moist. No injury or oral lesions. Tongue: No lesions. Tongue does not deviate from midline. Palate: No mass and lesions. Pharynx: Oropharynx is clear. Uvula midline. No pharyngeal swelling, oropharyngeal exudate, posterior oropharyngeal erythema or uvula swelling. Tonsils: No tonsillar exudate or tonsillar abscesses. Comments: Oral cavity/oropharyngeal exam within normal limits without lesions or masses noted. No inflammatory changes to the posterior oropharynx. Eyes:      General: No scleral icterus. Extraocular Movements: Extraocular movements intact. Conjunctiva/sclera: Conjunctivae normal.      Pupils: Pupils are equal, round, and reactive to light. Neck:      Thyroid: No thyroid mass or thyromegaly. Trachea: Trachea and phonation normal. No tracheal tenderness. Comments: No palpable cervical lymphadenopathy or neck masses. Thyroid is within normal limits. Midline tracheostomy scar noted well-healed with no stoma present. Pulmonary:      Effort: Pulmonary effort is normal. No tachypnea. Breath sounds: No stridor. Musculoskeletal:      Cervical back: Normal range of motion and neck supple. No edema, erythema or rigidity. Lymphadenopathy:      Head:      Right side of head: No submental, submandibular, preauricular or posterior auricular adenopathy. Left side of head: No submental, submandibular, preauricular or posterior auricular adenopathy. Cervical: No cervical adenopathy. Right cervical: No superficial, deep or posterior cervical adenopathy. Left cervical: No superficial, deep or posterior cervical adenopathy. Skin:     General: Skin is warm and dry. Neurological:      Mental Status: She is alert and oriented to person, place, and time. Cranial Nerves: Cranial nerves are intact. No facial asymmetry. Psychiatric:         Mood and Affect: Mood and affect normal.         Behavior: Behavior normal.          CT Results (most recent):  Results from Hospital Encounter encounter on 06/23/21    CT NECK SOFT TISSUE WO CONT    Narrative  INDICATION: stridor    COMPARISON: None    TECHNIQUE:  Axial neck CT was performed without IV contrast. Coronal and  sagittal reformats were obtained. Absence of intravenous contrast limits evaluation of the mucosa, vasculature and  solid structures. CT dose reduction was achieved through use of a standardized protocol tailored  for this examination and automatic exposure control for dose modulation. CONTRAST: None. FINDINGS:    The nasopharynx and oropharynx are unremarkable. There is soft tissue swelling  in the subglottic region, resulting in airway narrowing. The epiglottis is  within normal limits. There is no retropharyngeal or peritonsillar fluid  collection. The parotid and submandibular glands are unremarkable. There are multiple  thyroid nodules, largest in the left thyroid lobe measuring up to 2.5 cm. No  evidence of lymphadenopathy in the neck. Included globes appear intact. The paranasal sinuses are aerated. There is  chronic opacification of the mastoid air cells. Ground glass densities are noted in the visualized lung apices. Advanced  degenerative changes in the cervical spine bones are osteopenic. Impression  1. Soft tissue thickening in the subglottic region, resulting in airway  narrowing. Findings nonspecific but could be secondary to edema.     2. No peritonsillar or retropharyngeal fluid collection to suggest abscess. Epiglottis within normal limits. 3. Multiple thyroid nodules, largest in the left thyroid lobe measures up to 2.5  cm. Consider follow-up outpatient thyroid ultrasound to better evaluate. 4. Bilateral mastoid effusions, similar to prior exam.    5. Absence of IV contrast limits sensitivity. DC4        PROCEDURE: Flexible laryngoscopy  INDICATIONS: Stridor  DESCRIPTION: After verbal consent was obtained and a timeout was performed, the flexible scope was advanced down one of the patient's nares into the nasopharynx. The nasal cavity and nasopharynx were clear. The scope was then turned distally down towards the oropharynx. The BOT and vallecula were clear and the epiglottis was normal in appearance. Both aryepiglottic folds were normal.  There were no lesions or masses noted to the true vocal cords. Mobility of the vocal cords were relatively fixed having what appears to be likely bilateral vocal cord paralysis. These were noted to be fixed at the paramedian location and therefore a stable small glottic gap was present for ventilation. . No nodules or polyps. Airway with moderate narrowing at the level of the glottis due to paralysis but with sufficient to stable glottic gap. . No concerning lesions seen along post-cricoid region or within either piriform sinus. The posterior pharyngeal was clear as well. The scope was then carefully removed. The patient tolerated the procedure well and there were no complications. ASSESSMENT and PLAN        ICD-10-CM ICD-9-CM    1. Expiratory stridor  R06.1 786.1    2. Respiratory distress  R06.03 786.09    3. Stridor  R06.1 786.1    4. Acute diastolic CHF (congestive heart failure) ([facility])  I50.31 428.31      428.0    5. Pulmonary HTN ([facility])  I27.20 416.8    6. Acute cystitis without hematuria  N30.00 595.0    7. Vocal cord paresis  J38.00 478.30    8.  Acute hypercapnic respiratory failure ([facility])  J96.02 518.81    9. Bilateral vocal cord paralysis  J38.02 478.33      Patient has evidence of bilateral vocal cord paralysis with very mild quiver. Vocal cords are relatively fixed at the paramedian location with a small stable glottic gap to allow for ventilation. She does have some phonation with a hoarse quality to the voice. There was no evidence of shortness of breath or dyspnea on examination. Recommend continued steroid dosing with tapering for the next 1 to 2 weeks and to ensure that she is getting humidified oxygen. Discussed idiopathic bilateral vocal cord paralysis etiology to be most likely inflammatory and/or stress related from her longstanding recent intubation/tracheostomy. Also differential could include mass lesion to the location of the route of the laryngeal nerves. No obvious neck masses or lung masses noted on recent imaging. There were multiple thyroid nodules noted with the greatest being 2.5 cm in size. Outpatient follow-up with thyroid ultrasound and biopsy could rule out a thyroid malignancy. We discussed expected outcomes to include spontaneous resolution of the course of weeks to months versus continued bilateral vocal cord paralysis. We discussed that if her breathing is stable without significant then she could do fine with oxygen supplementation. If she becomes hypoxic or short of breath with activities of daily living the recommendation would be to replace tracheostomy. Patient is wheelchair-bound and therefore activities is not an issue at this time. Recommend follow-up in the office in 2 to 3 weeks for reevaluation.        Rina Savage DO  6/25/2021

## 2021-06-25 NOTE — PROGRESS NOTES
Cassia Bear  Admission Date: 6/23/2021             Daily Progress Note: 6/25/2021    The patient's chart is reviewed and the patient is discussed with the staff. Cassia Bear is an 78yoF who had a prolonged recovery from COVID-19 pneumonia with ARDS and was intubated from 12/30-1/19. She was decannulated at [facility] (after 3/9 discharge from [facility]). She also has h/o CAD, HTN, gastritis with UGIB, multiple infections (stenotrophomonas VAP, VRE UTI)        Subjective:   Still has some stridor but improved on steroids. Pt has high pitched voice. Has macrocytic anemia with drop in Hgb from 8.5 to 7.4g. UA notable for leukocytosis, bacteria, LE, nitrites suggestive of UTI. More crackles in chest today and very hyperglycemic. CT neck reviewed.   Tm  99.5    Current Facility-Administered Medications   Medication Dose Route Frequency    sodium zirconium cyclosilicate (LOKELMA) powder packet 15 g  15 g Oral NOW    nystatin (MYCOSTATIN) 100,000 unit/gram powder   Topical PRN    cefTRIAXone (ROCEPHIN) 1 g in 0.9% sodium chloride (MBP/ADV) 50 mL MBP  1 g IntraVENous Q24H    vancomycin (VANCOCIN) 1250 mg in  ml infusion  1,250 mg IntraVENous ONCE    vancomycin intermittent dosing placeholder   Other Rx Dosing/Monitoring    sodium chloride (NS) flush 5-40 mL  5-40 mL IntraVENous Q8H    sodium chloride (NS) flush 5-40 mL  5-40 mL IntraVENous PRN    acetaminophen (TYLENOL) tablet 650 mg  650 mg Oral Q6H PRN    Or    acetaminophen (TYLENOL) suppository 650 mg  650 mg Rectal Q6H PRN    polyethylene glycol (MIRALAX) packet 17 g  17 g Oral DAILY PRN    ondansetron (ZOFRAN ODT) tablet 4 mg  4 mg Oral Q8H PRN    Or    ondansetron (ZOFRAN) injection 4 mg  4 mg IntraVENous Q6H PRN    budesonide (PULMICORT) 500 mcg/2 ml nebulizer suspension  500 mcg Nebulization BID RT    albuterol-ipratropium (DUO-NEB) 2.5 MG-0.5 MG/3 ML  3 mL Nebulization Q4H RT    heparin (porcine) injection 5,000 Units  5,000 Units SubCUTAneous Q12H    methylPREDNISolone (PF) (SOLU-MEDROL) injection 40 mg  40 mg IntraVENous Q6H    alcohol 62% (NOZIN) nasal  1 Ampule  1 Ampule Topical Q12H    sodium chloride (NS) flush 5-10 mL  5-10 mL IntraVENous Q8H    sodium chloride (NS) flush 5-10 mL  5-10 mL IntraVENous PRN       Review of Systems  Constitutional: negative for fever, chills, sweats  Cardiovascular: negative for chest pain, palpitations, syncope, edema  Gastrointestinal:  negative for dysphagia, reflux, vomiting, diarrhea, abdominal pain, or melena  Neurologic:  negative for focal weakness, numbness, headache    Objective:     Vitals:    06/25/21 0501 06/25/21 0600 06/25/21 0601 06/25/21 0726   BP: (!) 157/72  (!) 162/73    Pulse: 86 86 90    Resp: 29 16 (!) 44    Temp: (!) 96.4 °F (35.8 °C) (!) 96.6 °F (35.9 °C) (!) 96.6 °F (35.9 °C) 97.5 °F (36.4 °C)   SpO2: 100% 100% 100%    Weight:       Height:             Intake/Output Summary (Last 24 hours) at 6/25/2021 0836  Last data filed at 6/24/2021 2250  Gross per 24 hour   Intake    Output 1050 ml   Net -1050 ml       Physical Exam:   Constitution:  the patient is well developed and in no acute distress  EENMT:  Sclera clear, pupils equal, oral mucosa moist.  High pitched voice and squeaking stridor with inspiration  Respiratory: crackles anteriorly. + transmitted stridor  Cardiovascular:  RRR without M,G,R  Gastrointestinal: soft and non-tender; with positive bowel sounds. Musculoskeletal: warm without cyanosis. There is no lower extremity edema. Skin:  no jaundice or rashes, no wounds   Neurologic: no gross neuro deficits     Psychiatric:  alert and oriented x 3    CXR:       CT Neck soft tissue 6/24  1. Soft tissue thickening in the subglottic region, resulting in airway  narrowing. Findings nonspecific but could be secondary to edema. 2. No peritonsillar or retropharyngeal fluid collection to suggest abscess.   Epiglottis within normal limits. 3. Multiple thyroid nodules, largest in the left thyroid lobe measures up to 2.5  cm. Consider follow-up outpatient thyroid ultrasound to better evaluate. 4. Bilateral mastoid effusions, similar to prior exam.  5. Absence of IV contrast limits sensitivity. LAB  No results for input(s): GLUCPOC in the last 72 hours. No lab exists for component: [place]   Recent Labs     06/25/21  0510 06/23/21  2318   WBC 8.9 8.2   HGB 7.4* 8.5*   HCT 24.3* 28.0*   * 103*     Recent Labs     06/25/21  0510 06/24/21  0337 06/23/21  2318    143 147*   K 5.7* 5.1 5.8*   * 114* 117*   CO2 24 22 25   * 273* 179*   BUN 68* 59* 58*   CREA 1.96* 1.86* 1.81*   MG  --  3.0* 2.6*   CA 8.9 8.8 8.8   ALB  --  3.2 3.2   TBILI  --  0.2 0.2   ALT  --  32 32     Recent Labs     06/25/21  0406 06/24/21  0315 06/23/21  2344   PHI 7.29* 7.23* 7.24*   PCO2I 52.7* 56.8* 58.0*   PO2I 113* 86 96   HCO3I 25.2 23.6 24.9     No results for input(s): LCAD, LAC in the last 72 hours. 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 --ENT evaluation appreciated. --add insulin for steroid-induced hyperglycemia. --Repeat BMP, give lasix 40mg IV  --SLP evaluation  --Full code    More than 50% of the time documented was spent in face-to-face contact with the patient and in the care of the patient on the floor/unit where the patient is located.     Rosie Garces, MD

## 2021-06-25 NOTE — PROGRESS NOTES
Bedside and verbal shift change report received from [facility] (offgoing nurse). Report included the following information SBAR, Kardex, MAR and Cardiac Rhythm NSR.      Dual skin assessment completed at bedside: no skin breakdown noted (list pertinent skin assessment findings)    Dual verification of gtts completed (name of gtts verified): N/A

## 2021-06-25 NOTE — PROGRESS NOTES
SPEECH PATHOLOGY NOTE:    Speech therapy consult received and appreciated. Patient known to this service from prior hospitalizations. Modified barium swallow study completed 3/4/21 with recommendations for mechanical soft diet/thin liquids. She was eventually upgraded to regular diet. Decannulated on 3/12/21. Last seen by speech therapy on 5/31/21- regular diet/thin liquids recommendations continued as no s/sx of airway compromise observed. Patient now admitted with stridor and high pitched voice. Limited movement of bilateral vocal cords noted on bronchoscopy on 6/24/21. ENT consult pending. Chest xray indicates Bilateral interstitial densities, similar to prior exam and likely sequela ofatypical infection. Pulmonary edema is considered less likely. Will defer dysphagia evaluation pending ENT consult and recommendations. Anticipate following up with patient later this PM. Plan discussed with RN.    ADDENDUM: Followed up with patient and daughter at bedside this PM. ENT consult still pending. After discussion regarding current condition and unknown plan of care/recommendations, daughter agreeable to defer dysphagia evaluation until after patient is seen by ENT. Plan to follow up later this evening or in AM pending scheduling.      LYNDSEY Rodriguez, CCC-SLP  Speech Language Pathologist  Acute Rehabilitation Services  Contact: Clara

## 2021-06-25 NOTE — PROGRESS NOTES
Patient resting with nurse at bedside. Clinician discussed the results of ENT assessment with nurse. 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 Patient is unable to adduct her vocal cords to provide vocal cord elevation to allow for safe swallow and due to vocal cord paralysis unable to listen for signs of aspiration at bedside. Patient would benefit from Modified barium swallow to objectively assess and ensure no aspiration when eating unable to subjectively assess this evening at bedside.   Young Padilla, SLP

## 2021-06-25 NOTE — PROGRESS NOTES
Interdisciplinary team rounds were held 6/25/2021 with the following team members:Care Management, Nursing, Pastoral Care, Pharmacy, Physical Therapy, Physician, Respiratory Therapy and Clinical Coordinator and the patient. Plan of care discussed. See clinical pathway and/or care plan for interventions and desired outcomes.

## 2021-06-26 NOTE — CONSULTS
Unique Hospitalist Consult Note    Patient: Tony Bruno Date: 6/26/2021  female, 78 y.o. Admit Date: 6/23/2021  Attending: Fahad Zazueta MD     ASSESSMENT AND PLAN:     Vocal cord paralysisContinue Prednisone speech therapy per ENT, may require trach patient fails to improve    Chronic respiratory failure secondary to hypercapniacontinue positive pressure ventilation at night, awaiting pulmonary team to arrange home CPAP. Awaiting CPAP arrangement     Dysphagiaawaiting barium swallow study scheduled on Monday, currently n.p.o. per speech therapy, patient is not amenable for NG feeding at this time, considered IV fluids as needed to reach euvolemia    Questionable urinary tract infectionon empiric Rocephin's, follow cultures, consider discontinue IV antibiotics if culture negative    Diastolic heart failuremild volume overload based on chest x-ray, will hold IV fluid for now, monitor volume status closely    History of CAD, hypertensions, gastritis with upper GI bleed, diabetes type 2, chronic kidney disease stage III, vitamin D deficiency, restless leg syndromes, chronic constipations, pulmonary hypertension's, protein malnutrition  -resume home medications if indicated ,and monitor clinically while inpatient, currently stable co morbidities add to patient's case complexity      REASON FOR CONSULT:  I was asked to consult on this patient at the kind request of Nilsa Felton MD, for medical management.     HISTORY OF PRESENT ILLNESS:      Patient Active Problem List   Diagnosis Code    Diabetes mellitus with hyperosmolarity without hyperglycemic hyperosmolar nonketotic coma (HCC) E11.00    Coronary artery disease involving coronary bypass graft of native heart without angina pectoris I25.810    Uncontrolled type 2 diabetes mellitus with hyperglycemia ([facility] [address] E11.65    Essential hypertension I10    HLD (hyperlipidemia) E78.5    Chronic kidney disease, stage III (moderate) (HCC) N18.30    Osteopenia M85.80    Vitamin D deficiency E55.9    Gastroesophageal reflux disease without esophagitis K21.9    Other allergic rhinitis J30.89    Peripheral neuropathy G62.9    Primary osteoarthritis involving multiple joints M89.49    Insomnia G47.00    RLS (restless legs syndrome) S45.23    Metabolic encephalopathy I16.13    Anemia D64.9    Generalized weakness R53.1    Decreased oral intake R63.8    Acute on chronic renal failure (HCC) N17.9, N18.9    Noncompliance Z91.19    Hypoalbuminemia due to protein-calorie malnutrition (HCC) E88.09, E46    COVID-19 U07.1    Acute respiratory distress syndrome (ARDS) due to severe acute respiratory syndrome coronavirus 2 (SARS-CoV-2) ([facility]) U07.1, J80    Acute hypoxemic respiratory failure ([facility]) J96.01    Cardiac arrest ([facility]) I46.9    CHF exacerbation ([facility]) I50.9    Acute diastolic CHF (congestive heart failure) ([facility]) E15.77    Diastolic CHF, chronic ([facility]) I50.32    Pulmonary HTN ([facility]) I27.20    Hyperkalemia E87.5    UTI (urinary tract infection) N39.0    Chronic hypoxemic respiratory failure ([facility]) J96.11    Hypercapnemia R06.89    Abnormal LFTs R94.5    Constipation K59.00    Myoclonus G25.3    History of COVID-19 Z86.16    Acute hypercapnic respiratory failure (HCC) J96.02    Stridor R06.1    Vocal cord paresis J38.00       Merari Ott is a 78 y.o. female, who  has a past medical history of Acute cystitis without hematuria (1/30/2019), Acute pancreatitis (8/12/2019), CAD (coronary artery disease), Diabetic ketoacidosis ([facility] [address] (3/8/2020), DM2 (diabetes mellitus, type 2) ([facility] [address] Elevated liver function tests (8/8/2019), Encephalopathy (2/7/2021), Gout, Gram-negative bacteremia (8/9/2019), HLD (hyperlipidemia), HTN (hypertension), Peripheral neuropathy, and Right lower quadrant abdominal pain (8/8/2019). ,  has a past surgical history that includes hx tubal ligation; hx coronary artery bypass graft; pr cardiac surg procedure unlist; hx cholecystectomy; and ir insert non tunl cvc over 5 yrs (1/11/2021). Patient unfortunately sustained a severe covid 19 infection, develop ARDS, after a very prolonged ICU course, s/p trach in jan 2021 ,,  d/c to [facility], [place], operated by [facility], patient has made a slow but remarkable recovery, regain consciousness, was decanullated, able to eat, unfortunately still wheelchair-bound and not return to previous functional status      Pt presented with SOB with stridor/ resp stress, admitted by pulmonary service to ICU, pt required bipap, ENT evaluatd pt and found pt to have vocal cord paralysis (despite normal voice/and swallow) , family and patient are reluctant for patient to be trached again, ENT DR. Ellis Valiente offered continue steroid , continue speech therapy and hopefully pt will improve eval needing tracheostomy for airway protection    Patient does have hypercapnia, requiring CPAP at night,   unfortinately pt loss  Her old CPAP due to NOT using,   pulmonary service is assisting the patient to arrange a new home CPAP prior to discharge to avoid hypercapnia at home  Patient also awaits a barium swallow study, tentatively scheduled on Monday    Possible UTI , on empiric abx     Allergy  Allergies   Allergen Reactions    Sulfa (Sulfonamide Antibiotics) Swelling       Medication list  Prior to Admission Medications   Prescriptions Last Dose Informant Patient Reported? Taking? Blood-Glucose Meter (ONETOUCH ULTRAMINI) monitoring kit   No No   Sig: Use as directed   Insulin Needles, Disposable, 31 gauge x 5/16\" ndle   No No   Sig: by SubCUTAneous route Before breakfast, lunch, dinner and at bedtime. Lancets (ONETOUCH ULTRASOFT LANCETS) misc   No No   Sig: Test 4 times daily as directed   Omeprazole delayed release (PRILOSEC D/R) 20 mg tablet   Yes No   Sig: Take 20 mg by mouth daily.    albuterol-ipratropium (DUO-NEB) 2.5 mg-0.5 mg/3 ml nebu mood probably not well today and not tomorrow   No No   Sig: 3 mL by Nebulization route every four (4) hours as needed for Wheezing or Shortness of Breath. ascorbic acid, vitamin C, (Vitamin C) 500 mg tablet   Yes No   Sig: Take  by mouth every other day. aspirin delayed-release 81 mg tablet   Yes No   Sig: Take  by mouth daily. clopidogrel (PLAVIX) 75 mg tab   Yes No   Sig: Take 75 mg by mouth. ferrous sulfate 325 mg (65 mg iron) tablet   Yes No   Sig: Take  by mouth every other day. furosemide (LASIX) 20 mg tablet   No No   Sig: Take 1 Tablet by mouth every other day.   gabapentin (NEURONTIN) 100 mg capsule   No No   Sig: Take 1 Cap by mouth three (3) times daily. glucose blood VI test strips (ONETOUCH ULTRA TEST) strip   No No   Sig: Test 4 times daily as directed   insulin lispro (HUMALOG) 100 unit/mL injection   No No   Si-10 Units by SubCUTAneous route Before breakfast, lunch, and dinner. For Blood Sugar (mg/dL) of:     Less than 150 =   0 units           150 -199 =   2 units  200 -249 =   4 units  250 -299 =   6 units  300 -349 =   8 units  350 and above = 10 units   metoprolol succinate (TOPROL-XL) 25 mg XL tablet   No No   Sig: Take 0.5 Tabs by mouth daily. rOPINIRole (REQUIP) 0.5 mg tablet   No No   Sig: Take 1 Tab by mouth nightly as needed.       Facility-Administered Medications: None         Past Medical History  Past Medical History:   Diagnosis Date    Acute cystitis without hematuria 2019    Acute pancreatitis 2019    CAD (coronary artery disease)     Diabetic ketoacidosis ([facility] [address] 3/8/2020    DM2 (diabetes mellitus, type 2) (HCC)     Elevated liver function tests 2019    Encephalopathy 2021    Gout     Gram-negative bacteremia 2019    HLD (hyperlipidemia)     HTN (hypertension)     Peripheral neuropathy     Right lower quadrant abdominal pain 2019       Past Surgical History:   Procedure Laterality Date    HX CHOLECYSTECTOMY      jennifer in 1964    HX CORONARY ARTERY BYPASS GRAFT      x3    HX TUBAL LIGATION      IR INSERT NON TUNL CVC OVER 5 YRS  1/11/2021    PA CARDIAC SURG PROCEDURE UNLIST      stents x 3 2009       Social History  Social History     Socioeconomic History    Marital status: SINGLE     Spouse name: Not on file    Number of children: Not on file    Years of education: Not on file    Highest education level: Not on file   Occupational History    Occupation: retired    Tobacco Use    Smoking status: Never Smoker    Smokeless tobacco: Never Used   Substance and Sexual Activity    Alcohol use: Yes     Comment: socially    Drug use: No   Social History Narrative    Lives alone but has a caregiver who helps several hours per day, several days per week     Social Determinants of Health     Financial Resource Strain:     Difficulty of Paying Living Expenses:    Food Insecurity:     Worried About Running Out of Food in the Last Year:     [address] in the Last Year:    Transportation Needs:     Lack of Transportation (Medical):  Lack of Transportation (Non-Medical):    Physical Activity:     Days of Exercise per Week:     Minutes of Exercise per Session:    Stress:     Feeling of Stress :    Social Connections:     Frequency of Communication with Friends and Family:     Frequency of Social Gatherings with Friends and Family:     Attends [employer] Services:     Active Member of Clubs or Organizations:     Attends Club or Organization Meetings:     Marital Status:        Family History:    Family History   Problem Relation Age of Onset    Hypertension Mother     Cancer Mother     Diabetes Mother     Heart Disease Mother        REVIEW OF SYSTEMS:   A 14 point review of systems was taken and pertinent positive as per HPI.     PHYSICAL EXAMINATION:  Vital 24 Hour Range Most Recent Value  Temperature Temp  Min: 97 °F (36.1 °C)  Max: 98 °F (36.7 °C) 98 °F (36.7 °C)    Pulse Pulse  Min: 62  Max: 90 80  Respiratory Resp  Min: 11  Max: 64 21  Blood Pressure BP  Min: 134/63  Max: 169/82 (!) 150/69  Pulse Oximetry SpO2  Min: 96 %  Max: 100 % 97 %  O2 No data recorded      Vital Most Recent Value First Value  Weight 67.9 kg (149 lb 11.1 oz) Weight: 68 kg (150 lb)  Height 5' 5\" (165.1 cm) Height: 5' 5\" (165.1 cm)  BMI   N/A    Physical Exam:   General: No acute distress, speaking in full sentences, no use of accessory muscles   HEENT: Pupils equal and reactive to light and accommodation, oropharynx is clear, high-pitched stridor noted and higher pitched voice  Neck: Supple, no lymphadenopathy, no JVD   Lungs: Crackles in all lung fields, and upper airway stridor   cardiovascular: Regular rate and rhythm with normal S1 and S2   Abdomen: Soft, nontender, nondistended, normoactive bowel sounds   Extremities: No cyanosis clubbing or edema   Neuro: Nonfocal, A&O x3   Psych: Normal affect     Intake/Output last 3 shifts:  Date 06/25/21 0700 - 06/26/21 0659 06/26/21 0700 - 06/27/21 0659   Shift [id number] 1900-0659 24 Hour Total 0586-4629 9458-2473 24 Hour Total   INTAKE   I.V.(mL/kg/hr) 350(0.4) 300(0.4) 650(0.4)        Volume (cefTRIAXone (ROCEPHIN) 1 g in 0.9% sodium chloride (MBP/ADV) 50 mL MBP) 50  50        Volume (linezolid in dextrose 5% (ZYVOX) IVPB premix in D5W 600 mg) 300 300 600      Shift Total(mL/kg) 350(5.1) 300(4.4) 650(9.6)      OUTPUT   Urine(mL/kg/hr) 850(1) 450(0.6) 1300(0.8) 225  225     Urine Output (mL) (Urinary Catheter 06/25/21 [place])  225  225   Shift Total(mL/kg) 850(12.4) 450(6.6) 1300(19.1) 225(3.3)  225(3.3)   NET -500 -150 -650 -225  -225   Weight (kg) 68.7 67.9 67.9 67.9 67.9 67.9       Labs:  magnesium:7,phos:7)CMP:   Lab Results   Component Value Date/Time     (H) 06/26/2021 03:14 AM    K 4.7 06/26/2021 03:14 AM     (H) 06/26/2021 03:14 AM    CO2 27 06/26/2021 03:14 AM    AGAP 5 (L) 06/26/2021 03:14 AM    GLU 98 06/26/2021 03:14 AM    BUN 72 (H) 06/26/2021 03:14 AM    CREA 1.81 (H) 06/26/2021 03:14 AM    GFRAA 34 (L) 06/26/2021 03:14 AM    GFRNA 28 (L) 06/26/2021 03:14 AM    CA 9.1 06/26/2021 03:14 AM         CBC:    Lab Results   Component Value Date/Time    WBC 8.9 06/26/2021 03:14 AM    HGB 7.5 (L) 06/26/2021 03:14 AM    HCT 24.1 (L) 06/26/2021 03:14 AM     (L) 06/26/2021 03:14 AM       Lab Results   Component Value Date/Time    INR 1.0 05/11/2021 01:14 PM    INR 1.0 05/06/2021 04:53 AM    INR 0.9 03/11/2021 06:12 AM    Prothrombin time 13.5 05/11/2021 01:14 PM    Prothrombin time 13.2 05/06/2021 04:53 AM    Prothrombin time 12.4 (L) 03/11/2021 06:12 AM       ABG:  No results found for: PH, PHI, PCO2, PCO2I, PO2, PO2I, HCO3, HCO3I, FIO2, FIO2I        Lab Results   Component Value Date/Time    CK 26 05/06/2021 04:53 AM    Troponin-I, Qt. <0.02 (L) 03/08/2020 01:58 PM    BNP 22 (H) 01/30/2019 10:32 AM       Imagining & Other Studies    XR Results (maximum last 3): Results from [place] encounter on 06/23/21    XR CHEST PORT    Narrative  Portable chest xray    COMPARISON: May 7, 2021    CLINICAL HISTORY: Syncope. FINDINGS:    There are bilateral interstitial densities, similar to prior exam. No  pneumothorax. Suggestion of small pleural effusion. Surrounding bones are  stable. Heart is enlarged. Impression  1. Bilateral interstitial densities, similar to prior exam and likely sequela of  atypical infection. Pulmonary edema is considered less likely. 2. Stable cardiomegaly. Results from [place] encounter on 05/05/21    XR ERCP / ERCB COMBINED    Narrative  ERCP    HISTORY: Dilated bile duct    TECHNIQUE AND FINDINGS: 2.6 minutes of fluoroscopy time was utilized. 6 spot  fluoroscopic images were saved. Initial image demonstrates injection of the dilated common bile duct with reflux  of contrast into mildly distended intrahepatic bile ducts. Per the procedure  note, a sphincterotomy was performed. Impression  Dilated extra hepatic bile ducts.       XR CHEST SNGL V    Narrative  PORTABLE CHEST, May 7, 2021 at 1801 hours    CLINICAL HISTORY:  Shortness of breath. COMPARISON:  May 5, 2021. FINDINGS:  AP erect image demonstrates cardiomegaly status post CABG of  pulmonary venous congestion, edema, and small pleural effusions in a pattern  most consistent with congestive heart failure/fluid imbalance. Edema is  slightly improved since prior study. A definite pneumothorax. There are  overlying radiopaque support devices. Impression  SLIGHT INTERVAL IMPROVEMENT IN CONGESTIVE HEART FAILURE/FLUID  IMBALANCE. CT Results (maximum last 3): Results from [place] encounter on 06/23/21    CT NECK SOFT TISSUE WO CONT    Narrative  INDICATION: stridor    COMPARISON: None    TECHNIQUE:  Axial neck CT was performed without IV contrast. Coronal and  sagittal reformats were obtained. Absence of intravenous contrast limits evaluation of the mucosa, vasculature and  solid structures. CT dose reduction was achieved through use of a standardized protocol tailored  for this examination and automatic exposure control for dose modulation. CONTRAST: None. FINDINGS:    The nasopharynx and oropharynx are unremarkable. There is soft tissue swelling  in the subglottic region, resulting in airway narrowing. The epiglottis is  within normal limits. There is no retropharyngeal or peritonsillar fluid  collection. The parotid and submandibular glands are unremarkable. There are multiple  thyroid nodules, largest in the left thyroid lobe measuring up to 2.5 cm. No  evidence of lymphadenopathy in the neck. Included globes appear intact. The paranasal sinuses are aerated. There is  chronic opacification of the mastoid air cells. Ground glass densities are noted in the visualized lung apices. Advanced  degenerative changes in the cervical spine bones are osteopenic. Impression  1. Soft tissue thickening in the subglottic region, resulting in airway  narrowing. Findings nonspecific but could be secondary to edema.     2. No peritonsillar or retropharyngeal fluid collection to suggest abscess. Epiglottis within normal limits. 3. Multiple thyroid nodules, largest in the left thyroid lobe measures up to 2.5  cm. Consider follow-up outpatient thyroid ultrasound to better evaluate. 4. Bilateral mastoid effusions, similar to prior exam.    5. Absence of IV contrast limits sensitivity. DC4      Results from [place] encounter on 05/05/21    CT HEAD WO CONT    Narrative  EXAM: CT HEAD WITHOUT CONTRAST    INDICATION: Difficulty awakening, AMS. COMPARISON: Head CT 4/16/2021    TECHNIQUE: Contiguous axial images were obtained from the skull base through the  vertex without IV contrast. Radiation dose reduction techniques were used for  this study. Our CT scanners use one or all of the following: Automated exposure  control, adjustment of the mA and/or kV according to patient size, iterative  reconstruction. FINDINGS:  Global parenchymal volume loss and ex vacuo ventriculomegaly. Periventricular  white matter hypoattenuation reflects sequelae of small vessel ischemic disease. No acute infarction or hemorrhage. Remote right PCA territory infarction. Remote  lacunar infarct of the left basal ganglia. No hydrocephalus or midline shift. No  extra-axial mass or hemorrhage. The basal cisterns are patent. The visualized portions of the orbits are normal. The mastoid air cells and  paranasal sinuses are patent. The visualized vascular structures have an unremarkable noncontrast appearance. The calvarium and soft tissues appear normal.    Impression  No acute intracranial abnormality evident by CT.       Results from [place] encounter on 04/16/21    CT HEAD WO CONT    Narrative  EXAMINATION: CT HEAD WO CONT 4/16/2021 4:33 PM    INDICATION: Achalasia and tremors    COMPARISON: CT head January 22, 2021    TECHNIQUE: Multiple-row detector helical CT examination of the head without  intravenous contrast.    Radiation dose reduction techniques were used for this study. Our CT scanners  use one or all of the following: Automated exposure control, adjustment of the  mA and/or kV according to patient size, iterative reconstruction. FINDINGS:  No acute intracranial hemorrhage. Unchanged old moderate right PCA territory  infarct. Normal size of ventricles and extra-axial spaces for age. No  space-occupying lesion. Bilateral mastoid fluid. Prior bilateral lens  replacements. No abnormality of extracranial soft tissues. Impression  1. No acute abnormality. 2. 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 MRI Results (maximum last 3): Results from [place] encounter on 05/05/21    MRI ABD W WO CONT    Narrative  MRI OF THE ABDOMEN    INDICATION: Bile duct dilatation, abnormal liver function    Standard MRI sequences were obtained through the abdomen in multiple planes. Images were obtained before and after intravenous injection of 12 mL of  ProHance. COMPARISON: Ultrasound dated 05/07/2021    FINDINGS:  - LIVER: Normal in size and appearance. - GALLBLADDER/BILE DUCTS: Postcholecystectomy. There is moderate bile duct  dilatation. Common duct measures 15 mm. No discrete stricture or stone is  seen. - PANCREAS: Mild pancreatic duct dilatation, 5 mm. No discrete pancreas mass is  no evidence of acute pancreas inflammation.  - SPLEEN: Normal.  - ADRENALS: Normal.  - KIDNEYS/URETERS: No hydronephrosis or significant mass.  - LYMPH NODES: No significant retroperitoneal or mesenteric adenopathy.  - BONES: No fracture or significant bone lesion.  - OTHER: No significant ascites. Impression  Moderate bile duct dilatation. No obstructing stone or mass is  seen.       If there are any questions about this report, I can be reached on [employer]  or at [phone]      Results from [place] encounter on 12/24/20    MRI BRAIN WO CONT    Narrative  Exam: MRI BRAIN WO CONT on 1/27/2021 12:06 PM    Clinical History: The Female patient is 78years old presenting for Post cardiac  arrest and unresponsive. COVID confirmed. Comparison:  Head CT 1/20/2021    Technique:  Axial T2, axial FLAIR, axial diffusion-weighted,  sagittal T1 and  coronal gradient-echo scans were performed. Findings:    Age-related cortical involutional changes are seen with sulcal and ventricular  prominence. There is no evidence of acute intracranial ischemia. Remote ischemic  changes are noted throughout the inferior right temporal occipital lobe with  focal encephalomalacia, and gliosis as well as slight laminar necrosis. Chronic  lacunae are seen throughout the basal ganglia and thalami and minimally the  corin. There are no abnormal extra-axial fluid collections. No evidence of mass or mass  effect is seen. There is no diffusion signal abnormality. Expected flow voids  are maintained in the major intracranial vessels. The cerebellum is otherwise unremarkable. There is no evidence of Chiari  malformation. The ventricular system and CSF containing spaces are unremarkable in appearance. Visualized extracranial soft tissues are unremarkable. The paranasal sinuses are well pneumatized and aerated. Impression  1. Stable age-related senescent changes and chronic microvascular disease with  remote right posterior temporal occipital ischemic insult. CPT code(s) [id number]      Results from [place] encounter on 01/30/19    MRI BRAIN W WO CONT    Narrative  MRI of the brain    INDICATION:  Seizures, encephalopathy    Standard MRI sequences were obtained through the brain in multiple planes. Images were obtained before and after intravenous infusion of 15 mL of Dotarem. FINDINGS:  There is mild diffuse cerebral and cerebellar atrophy. There is mild chronic  change in the basal ganglia and brainstem. There is focal encephalomalacia in  the right occipital lobe, likely old infarct. Temporal lobes are symmetric in  size. [facility] Records There is no evidence of acute hemorrhage or infarction. The ventricles  are normal in size. There are no extra-axial fluid collections. There are no  bony lesions. The sinuses are clear. Post-contrast images show no abnormal enhancement. There are no masses. Impression  IMPRESSION: Old right occipital infarct. No evidence of acute intracranial  abnormality      Nuclear Medicine Results (maximum last 3): No results found for this or any previous visit. US Results (maximum last 3): Results from [place] encounter on 05/05/21    US ABD LTD    Narrative  Right upper quadrant ultrasound    HISTORY: Transaminitis. Assess for cirrhosis or cholelithiasis    Real-time sonography of the right upper quadrant was performed. Comparison: None. Correlation is made to the noncontrast CT scan of the abdomen  and pelvis 08/08/1990. FINDINGS: Hepatic echotexture is felt to be at the upper limits of normal. There  is no intrahepatic biliary ductal dilatation. There is no focal hepatic mass. The gallbladder is absent. The common bile duct is dilated measuring 14 mm. The pancreatic duct is also dilated at 4 to 5 mm. Only portions of the  pancreatic head and proximal body is able to the visualized with the remaining  portions obscured by overlying bowel gas. The right kidney measures 9.0 cm and is echogenic. There is no hydronephrosis. The visualized portions of the abdominal aorta and inferior vena cava are  unremarkable. There is normal hepatopetal flow within the main portal vein. No  free fluid is seen within the right upper quadrant. Impression  1. Dilated common bile duct as well as dilatation of the visualized pancreatic  duct. While these findings could be in part due to biliary ectasia following  cholecystectomy, the possibility of an obstructing mass or stricture cannot be  excluded. An MRCP or ERCP may be beneficial if there is further clinical  concern.   2. Echogenic and atrophic right kidney suggesting medical renal disease. Results from Hospital Encounter encounter on 09/16/19    US RETROPERITONEUM COMP    Narrative  RENAL ULTRASOUND 9/16/2019 6:13 PM    HISTORY: Acute kidney injury; acute renal failure; generalized weakness with  decreased oral intake; PRATIMA    TECHNIQUE: Sonographic imaging of the kidneys, bladder and retroperitoneal  vessels was performed. COMPARISON: CT abdomen and pelvis August 8, 2019    FINDINGS: The right kidney is 9.7 cm in length. The left kidney is 9.6 cm in  length. There is no hydronephrosis. There are no visible shadowing renal  calculi. The bladder is normal in appearance. The IVC is patent. The abdominal aorta is 1.8 cm in diameter. Impression  IMPRESSION:  No hydronephrosis or acute renal findings. DEXA Results (maximum last 3): Results from Abstract encounter on 09/15/14    DEXA BONE DENSITY STUDY AXIAL      ASIA Results (maximum last 3): Results from Abstract encounter on 09/17/14    ASIA MAMMOGRAM SCREEN BILAT      IR Results (maximum last 3): Results from [place] encounter on 12/24/20    IR INSERT NON TUNL CVC OVER 5 YRS    Narrative  Title:  Temporary hemodialysis catheter placement. Indication:  18-year-old female with acute kidney injury, COVID pneumonia, acute  hypoxemic respiratory failure. This procedure was performed at the patient's bedside due to her clinical acuity  and to limit the risk of spreading infection throughout the hospital during  transport. A temporary hemodialysis catheter is placed for this patient through the right  internal jugular vein using ultrasound and fluoroscopic guidance with maximum  sterile barrier appropriately documented and fluoro time and images documented  in the report    :  Berta Hernandez PA-C    Supervising Physician: Brendan Schulz M.D. Consent: Informed consent was obtained via the ICU global consent form. .    Procedure:  This central venous catheter was inserted with all elements of  maximal sterile barrier technique and cap and mask and sterile gown and sterile  gloves and sterile full-body drape and hand hygiene and 2% chlorhexidine for  cutaneous antisepsis and sterile ultrasound gel and sterile ultrasound probe  cover. Following routine prep and drape of the RIGHT neck, a local field block with  lidocaine was achieved. Ultrasound evaluation of all potential access sites was  performed due to lack of a palpable vein. No veins were palpable due to  overlying adipose tissue. Using real-time ultrasound guidance, with appropriate  image recording and visualization of vascular needle entry, the patent RIGHT  internal jugular vein was accessed using micropuncture technique. A hemodialysis catheter was advanced over the wire. All lumens aspirated easily and were filled with heparinized saline. The  catheter was secured with nonabsorbable suture. Sterile dressings were applied. Complications: None. Radiation dose:  Fluoroscopy time: 0 seconds. Reference air kerma (mGy): 0  Kerma area product (cGy.cm2):  0  Fluoroscopic images: 0    Contrast:  0 ml. Medications:  Lidocaine    Findings:  Patent right internal jugular vein. Impression  Impression:  Technically successful temporary hemodialysis catheter placement. Plan: Chest x-ray to confirm placement. Results from Hospital Encounter encounter on 01/30/19    IR SPINAL PUNCTURE LUMBAR DIAGNOSTIC    Narrative  Title: Lumbar puncture. History: Critically ill, encephalopathic, 49-year-old woman. Emergent  diagnostic lumbar puncture requested. Consent: Informed written and oral consent was obtained from the patient's  daughter after explanation of benefits and risks of the procedure. Procedure:  Sterile barrier technique (including:  cap, mask, sterile gloves,  sterile sheet, hand hygiene, and chlorhexidene for cutaneous antisepsis) were  used.   With the patient prone, the skin of the back was prepped and draped in  the standard sterile fashion. 1% lidocaine was used for local field block. Using  fluoroscopy, a 22 gauge spinal needle was advanced into the intrathecal space at  the L5-S1 level. Appropriate position was confirmed with clear spinal fluid  return. Unfortunately, the patient immediately began moving, turning from side  to side, and the flow of CSF return bloody and then stopped. Throughout the  procedure, 3 and eventually 4, assistants helped to hold her still and keep her  safe. Opening pressure was 18 cm H20. A total of about 2-3 cc was removed for evaluation. The needle was removed and a dressing was applied. Complications:  None. Radiation Exposure Indices:  Reference Air Kerma (Ka,r) = 4 mGy  Dose Area Product/Kerma Area Product (DAP/KARLOS/PKA) = 54.91 cGy-cm2  Fluoroscopy Exposure Time = 24 seconds  Fluorographic Images = 3    Contrast:  0 milliliters. Impression  Impression: Uncomplicated lumbar puncture. Plan: The patient will recover at bedrest for 1 hours in her hospital room. Recommend no additional anticoagulation for 24 hours. VAS/US Results (maximum last 3): Results from [place] encounter on 12/24/20    DUPLEX LOWER EXT VENOUS BILAT    Narrative  Bilateral lower extremity venous ultrasound    INDICATION:  Pain and swelling,    Doppler ultrasound of both lower extremities was performed. FINDINGS:  There is normal flow in the greater saphenous, common femoral,  superficial femoral, and popliteal veins. Normal compression and augmentation is  demonstrated. The proximal calf veins are also patent.     Impression  IMPRESSION: No evidence of deep venous thrombosis in either lower extremity      Results from Abstract encounter on 10/20/20    DUPLEX LOW EXT ARTERY RIGHT      Results from Office Visit encounter on 10/19/20    DUPLEX LOW EXT ARTERIES WITH [place]ERET[place]    Narrative  Final Vascular Lab ultrasound report scanned under the imaging tab. Click RESULTS link to view. PET Results (maximum last 3): No results found for this or any previous visit. EKG Results     Procedure 720 Value Units Date/Time    EKG [[id number]] Collected: 06/23/21 2247    Order Status: Completed Updated: 06/24/21 0657     Ventricular Rate 65 BPM      Atrial Rate 63 BPM      QRS Duration 92 ms      Q-T Interval 440 ms      QTC Calculation (Bezet) 457 ms      Calculated R Axis 31 degrees      Calculated T Axis 95 degrees      Diagnosis --     !! AGE AND GENDER SPECIFIC ECG ANALYSIS !! Normal sinus rhythm  Anterior infarct (cited on or before 16-APR-2021)  Abnormal ECG  When compared with ECG of 30-MAY-2021 19:48,  Junctional rhythm has replaced Sinus rhythm  Confirmed by Lynette Ayers MD (), Rex Jackson ([id number]) on 6/24/2021 6:57:27 AM              Thank you Danny Mcgrath MD for allowing us to participate in the care of this interesting patient. We shall follow with you.     Trevor Taylor DO  6/26/2021 6:19 PM

## 2021-06-26 NOTE — PROGRESS NOTES
06/26/21 1815   Dual Skin Pressure Injury Assessment   Dual Skin Pressure Injury Assessment X   Second Care Provider (Based on [address] [place] [address]) FAY Alvarenga    Heel  Bilateral  (boggy)   Skin Integumentary   Skin Integumentary (WDL) X    Pressure  Injury Documentation   (heels )   Skin Color Appropriate for ethnicity   Skin Condition/Temp Dry;Flaky   Skin Integrity Excoriation;Scars (comment); Lesion (comment)   Turgor Atrophic   Wound Prevention and Protection Methods   Orientation of Wound Prevention Posterior   Location of Wound Prevention Sacrum/Coccyx   Dressing Present  No   Wound Offloading (Prevention Methods) Bed, pressure reduction mattress;Repositioning; Heel boots; Elevate heels; Turning   Multiple scars to the abdomen and BLE, healed  lesions and scabbed abrasions to BLE, discoloration to the sacrum area.

## 2021-06-26 NOTE — PROGRESS NOTES
TRANSFER - OUT REPORT:    Verbal report given to Juve Lei RN on Clemencia Lynne  being transferred to 8th floor for routine progression of care       Report consisted of patients Situation, Background, Assessment and   Recommendations(SBAR). Information from the following report(s) SBAR, ED Summary, Procedure Summary, Intake/Output, MAR, Med Rec Status and Cardiac Rhythm NSR was reviewed with the receiving nurse. Lines:   Peripheral IV 06/24/21 Posterior;Right Forearm (Active)   Site Assessment Clean, dry, & intact 06/26/21 1642   Phlebitis Assessment 0 06/26/21 1642   Infiltration Assessment 0 06/26/21 1642   Dressing Status Clean, dry, & intact 06/26/21 1642   Dressing Type Tape;Transparent 06/26/21 1642   Hub Color/Line Status Flushed;Patent 06/26/21 1642   Alcohol Cap Used No 06/25/21 0748       Peripheral IV 06/25/21 Right Forearm (Active)   Site Assessment Clean, dry, & intact 06/26/21 1642   Phlebitis Assessment 0 06/26/21 1642   Infiltration Assessment 0 06/26/21 1642   Dressing Status Clean, dry, & intact 06/26/21 1642   Dressing Type Transparent;Tape 06/26/21 1642   Hub Color/Line Status Patent; Flushed 06/26/21 1642        Opportunity for questions and clarification was provided.       Patient transported with:   O2 @ 2 liters  Registered Nurse

## 2021-06-26 NOTE — PROGRESS NOTES
Merari Ott  Admission Date: 6/23/2021             Daily Progress Note: 6/26/2021    The patient's chart is reviewed and the patient is discussed with the staff. Merari Ott is an 78yoF who had a prolonged recovery from COVID-19 pneumonia with ARDS and was intubated from 12/30-1/19. She was decannulated at [facility] (after 3/9 discharge from [facility]). She also has h/o CAD, HTN, gastritis with UGIB, multiple infections (stenotrophomonas VAP, VRE UTI)    Subjective:   ENT evaluation also suggestive of bilateral vocal cord paralysis with fixed paramedian position with posterior gap. Recommendations were for continued steroid taper over 1-2 weeks with humidified oxygen and follow up in the office in 2-3 weeks with Dr. Juan Oseguera. This morning her stridor is improved and her voice is stronger. Speech has not cleared her for oral intake yet. Hemoglobin is stable and afebrile.     Current Facility-Administered Medications   Medication Dose Route Frequency    lip protectant (BLISTEX) ointment 1 Each  1 Each Topical PRN    nystatin (MYCOSTATIN) 100,000 unit/gram powder   Topical PRN    cefTRIAXone (ROCEPHIN) 1 g in 0.9% sodium chloride (MBP/ADV) 50 mL MBP  1 g IntraVENous Q24H    insulin lispro (HUMALOG) injection   SubCUTAneous AC&HS    linezolid in dextrose 5% (ZYVOX) IVPB premix in D5W 600 mg  600 mg IntraVENous Q12H    sodium chloride (OCEAN) 0.65 % nasal squeeze bottle 2 Spray  2 Spray Both Nostrils Q2H PRN    sodium chloride (NS) flush 5-40 mL  5-40 mL IntraVENous PRN    acetaminophen (TYLENOL) tablet 650 mg  650 mg Oral Q6H PRN    Or    acetaminophen (TYLENOL) suppository 650 mg  650 mg Rectal Q6H PRN    polyethylene glycol (MIRALAX) packet 17 g  17 g Oral DAILY PRN    ondansetron (ZOFRAN ODT) tablet 4 mg  4 mg Oral Q8H PRN    Or    ondansetron (ZOFRAN) injection 4 mg  4 mg IntraVENous Q6H PRN    budesonide (PULMICORT) 500 mcg/2 ml nebulizer suspension  500 mcg Nebulization BID RT    albuterol-ipratropium (DUO-NEB) 2.5 MG-0.5 MG/3 ML  3 mL Nebulization Q4H RT    heparin (porcine) injection 5,000 Units  5,000 Units SubCUTAneous Q12H    methylPREDNISolone (PF) (SOLU-MEDROL) injection 40 mg  40 mg IntraVENous Q6H    alcohol 62% (NOZIN) nasal  1 Ampule  1 Ampule Topical Q12H    sodium chloride (NS) flush 5-10 mL  5-10 mL IntraVENous Q8H    sodium chloride (NS) flush 5-10 mL  5-10 mL IntraVENous PRN       Review of Systems  Constitutional: negative for fever, chills, sweats  Cardiovascular: negative for chest pain, palpitations, syncope, edema  Gastrointestinal:  negative for dysphagia, reflux, vomiting, diarrhea, abdominal pain, or melena  Neurologic:  negative for focal weakness, numbness, headache    Objective:     Vitals:    06/26/21 0427 06/26/21 0433 06/26/21 0502 06/26/21 0601   BP:   (!) 145/67 (!) 150/66   Pulse:   76 68   Resp:   (!) 31 (!) 64   Temp:       SpO2: 100%  100% 100%   Weight:  149 lb 11.1 oz (67.9 kg)     Height:             Intake/Output Summary (Last 24 hours) at 6/26/2021 0826  Last data filed at 6/26/2021 0433  Gross per 24 hour   Intake 650 ml   Output 1300 ml   Net -650 ml       Physical Exam:   Constitution:  the patient is well developed and in no acute distress  EENMT:  Sclera clear, pupils equal, oral mucosa moist.  High pitched voice and squeaking stridor with inspiration  Respiratory: crackles anteriorly. + transmitted stridor  Cardiovascular:  RRR without M,G,R  Gastrointestinal: soft and non-tender; with positive bowel sounds. Musculoskeletal: warm without cyanosis. There is no lower extremity edema. Skin:  no jaundice or rashes, no wounds   Neurologic: no gross neuro deficits     Psychiatric:  alert and oriented x 3    CXR:       CT Neck soft tissue 6/24  1. Soft tissue thickening in the subglottic region, resulting in airway  narrowing. Findings nonspecific but could be secondary to edema.   2. No peritonsillar or retropharyngeal fluid collection to suggest abscess. Epiglottis within normal limits. 3. Multiple thyroid nodules, largest in the left thyroid lobe measures up to 2.5  cm. Consider follow-up outpatient thyroid ultrasound to better evaluate. 4. Bilateral mastoid effusions, similar to prior exam.  5. Absence of IV contrast limits sensitivity. LAB  Recent Labs     06/26/21  0754 06/25/21  2104 06/25/21  1743 06/25/21  1101   GLUCPOC 151* 81 225* 404*      Recent Labs     06/26/21  0314 06/25/21  0510 06/23/21  2318   WBC 8.9 8.9 8.2   HGB 7.5* 7.4* 8.5*   HCT 24.1* 24.3* 28.0*   * 112* 103*     Recent Labs     06/26/21  0314 06/25/21  1343 06/25/21  0510 06/24/21  0337 06/24/21  0337 06/23/21  2318 06/23/21  2318   * 145 144   < > 143   < > 147*   K 4.7 4.8 5.7*   < > 5.1   < > 5.8*   * 114* 115*   < > 114*   < > 117*   CO2 27 24 24   < > 22   < > 25   GLU 98 368* 402*   < > 273*   < > 179*   BUN 72* 71* 68*   < > 59*   < > 58*   CREA 1.81* 1.95* 1.96*   < > 1.86*   < > 1.81*   MG  --   --   --   --  3.0*  --  2.6*   CA 9.1 9.0 8.9   < > 8.8   < > 8.8   ALB  --   --   --   --  3.2  --  3.2   TBILI  --   --   --   --  0.2  --  0.2   ALT  --   --   --   --  32  --  32    < > = values in this interval not displayed. Recent Labs     06/25/21  0406 06/24/21  0315 06/23/21  2344   PHI 7.29* 7.23* 7.24*   PCO2I 52.7* 56.8* 58.0*   PO2I 113* 86 96   HCO3I 25.2 23.6 24.9     No results for input(s): LCAD, LAC in the last 72 hours.       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 --ENT evaluation appreciated. Will need 2-3 f/u with Dr. Gopi Parmar upon discharge  --follow up with SLP regarding ability to start oral diet. --add insulin for steroid-induced hyperglycemia.  --has diastolic dysfunction and Group 2 PH. Needs diuresis with active pulmonary edema. Would add low dose spironolactone when can take Po.  --SLP evaluation  --has h/o TANISHA and wasn't compliant with CPAP in past.  It is now more important that she wear CPAP every night. She agrees to resume therapy and adhere to these recommendations. --Full code    More than 50% of the time documented was spent in face-to-face contact with the patient and in the care of the patient on the floor/unit where the patient is located.     Maggie Rudd MD

## 2021-06-26 NOTE — ROUTINE PROCESS
TRANSFER - IN REPORT:    Verbal report received from FAY Casey  on Firth Hoots  being received from ICU for routine progression of care      Report consisted of patients Situation, Background, Assessment and   Recommendations(SBAR). Information from the following report(s) SBAR, Kardex, Intake/Output and MAR was reviewed with the receiving nurse. Opportunity for questions and clarification was provided. Assessment completed upon patients arrival to unit and care assumed.

## 2021-06-26 NOTE — PROGRESS NOTES
Comprehensive Nutrition Assessment    Type and Reason for Visit: Initial, Positive nutrition screen  Best Practice Alert for EN/PN PTA    Nutrition Recommendations/Plan:    Diet advancement per SLP     Malnutrition Assessment:  Malnutrition Status: At risk for malnutrition (specify) (NPO since admission)    Nutrition Assessment:   Nutrition History: Pt reports eating all of her meals, but does not provide diet hx. She reports previously losing wt, but has been gaining wt. She is known to nutrition services from previous admissions requiring nutrition supports. RN reports pt no longer has a PEG. Nutrition Background: Pt with PMH notable for COVID in 12/2020, DM II, CAD, HLD, HTN, CKD III, osteopenia, GERD, and CHF. Pt admitted for acute hypercapnic respiratory failure with stridor. Evaluated by ENT 6/25 after bronchoscopy 6/24 due to bilateral vocal cord paralysis vs paresis. Daily Update:  NPO per SLP on 6/26 for pending MBS 6/28. No family in room at time of visit.      Nutrition Related Findings:   NPO per SLP 6/25      Current Nutrition Therapies:  DIET NPO    Current Intake:   Average Meal Intake: NPO Average Supplement Intake: NPO      Anthropometric Measures:  Height: 5' 5\" (165.1 cm)  Current Body Wt: 67.9 kg (149 lb 11.1 oz) (6/26), Weight source: Bed scale  BMI: 24.9, Normal weight (BMI 22.0-24.9) age over 72  Admission Body Weight: 160 lb 0.9 oz (bed scale; 6/24)  Ideal Body Weight (lbs) (Calculated): 125 lbs (57 kg), 119.8 %  Usual Body Wt: 63.6 kg (140 lb 3.4 oz) (3/16; hospital admission), Percent weight change: 6.8          Edema: No data recorded   Estimated Daily Nutrient Needs:  Energy (kcal/day): 6238-5087 (Kcal/kg (20-25), Weight Used: Current (67.9kg))  Protein (g/day): 68-81 (1-1.2gm/kg) Weight Used: (Current (67.9kg))  Fluid (ml/day):   (1 ml/kcal)    Nutrition Diagnosis:   · Inadequate oral intake related to swallowing difficulty as evidenced by  (NPO per SLP)    Nutrition Interventions:   Food and/or Nutrient Delivery: Continue NPO     Coordination of Nutrition Care: Continue to monitor while inpatient  Plan of Care discussed with Geronimo Parmar RN    Goals: Active Goal: Meet >75% estimated nutrition needs within 5 days. Nutrition Monitoring and Evaluation:      Food/Nutrient Intake Outcomes: Diet advancement/tolerance  Physical Signs/Symptoms Outcomes: Biochemical data    Discharge Planning:     Too soon to determine    Electronically signed by Shazia Walter MS, DIAMANTE, LD 6/26/2021 at 3:10 PM  Contact: [phone]

## 2021-06-26 NOTE — PROGRESS NOTES
SPEECH PATHOLOGY NOTE:  Reconsult received. Due to bilateral vocal cord paralysis fixed at paramedian position per laryngoscopy, unable to rule out aspiration at bedside.  Modified Barium Swallow study has been ordered to objectively assess pharyngeal swallow, to be completed Monday AM.  Lanette Gutierrez MA, CCC-SLP

## 2021-06-27 NOTE — PROGRESS NOTES
Stuart Conley  Admission Date: 6/23/2021             Daily Progress Note: 6/27/2021    The patient's chart is reviewed and the patient is discussed with the staff. Stuart Conley is an 78yoF who had a prolonged recovery from COVID-19 pneumonia with ARDS and was intubated from 12/30-1/19. She was decannulated at [facility] (after 3/9 discharge from [facility]). She also has h/o CAD, HTN, gastritis with UGIB, multiple infections (stenotrophomonas VAP, VRE UTI). 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 Thus far have been able to manage with CPAP/BiPAP and steroids. ENT has seen and plans short-term outpatient follow up to decide about trach. Subjective:   Planning on MBS tomorrow  Needs CPAP arranged nightly at home. Has prior h/o TANISHA and now superimposed bilateral vocal cord paralysis.     Current Facility-Administered Medications   Medication Dose Route Frequency    hydrALAZINE (APRESOLINE) 20 mg/mL injection 10 mg  10 mg IntraVENous Q6H PRN    lip protectant (BLISTEX) ointment 1 Each  1 Each Topical PRN    spironolactone (ALDACTONE) tablet 12.5 mg  12.5 mg Oral DAILY    furosemide (LASIX) injection 20 mg  20 mg IntraVENous Q12H    methylPREDNISolone (PF) (SOLU-MEDROL) injection 40 mg  40 mg IntraVENous Q12H    insulin glargine (LANTUS) injection 12 Units  12 Units SubCUTAneous DAILY    nystatin (MYCOSTATIN) 100,000 unit/gram powder   Topical PRN    insulin lispro (HUMALOG) injection   SubCUTAneous AC&HS    linezolid in dextrose 5% (ZYVOX) IVPB premix in D5W 600 mg  600 mg IntraVENous Q12H    sodium chloride (OCEAN) 0.65 % nasal squeeze bottle 2 Spray  2 Spray Both Nostrils Q2H PRN    sodium chloride (NS) flush 5-40 mL  5-40 mL IntraVENous PRN    acetaminophen (TYLENOL) tablet 650 mg  650 mg Oral Q6H PRN    Or    acetaminophen (TYLENOL) suppository 650 mg  650 mg Rectal Q6H PRN    polyethylene glycol (MIRALAX) packet 17 g  17 g Oral DAILY PRN    ondansetron (ZOFRAN ODT) tablet 4 mg  4 mg Oral Q8H PRN    Or    ondansetron (ZOFRAN) injection 4 mg  4 mg IntraVENous Q6H PRN    budesonide (PULMICORT) 500 mcg/2 ml nebulizer suspension  500 mcg Nebulization BID RT    albuterol-ipratropium (DUO-NEB) 2.5 MG-0.5 MG/3 ML  3 mL Nebulization Q4H RT    heparin (porcine) injection 5,000 Units  5,000 Units SubCUTAneous Q12H    alcohol 62% (NOZIN) nasal  1 Ampule  1 Ampule Topical Q12H    sodium chloride (NS) flush 5-10 mL  5-10 mL IntraVENous Q8H    sodium chloride (NS) flush 5-10 mL  5-10 mL IntraVENous PRN       Review of Systems  Constitutional: negative for fever, chills, sweats  Cardiovascular: negative for chest pain, palpitations, syncope, edema  Gastrointestinal:  negative for dysphagia, reflux, vomiting, diarrhea, abdominal pain, or melena  Neurologic:  negative for focal weakness, numbness, headache    Objective:     Vitals:    06/27/21 0504 06/27/21 0711 06/27/21 0737 06/27/21 1101   BP:   (!) 161/77    Pulse:   92    Resp:   17    Temp:   97.9 °F (36.6 °C)    SpO2:  99% 95% 90%   Weight: 150 lb 2.1 oz (68.1 kg)      Height:             Intake/Output Summary (Last 24 hours) at 6/27/2021 1108  Last data filed at 6/27/2021 0400  Gross per 24 hour   Intake 600 ml   Output 1595 ml   Net -995 ml       Physical Exam:   Constitution:  the patient is well developed and in no acute distress  EENMT:  Sclera clear, pupils equal, oral mucosa moist.  High pitched voice and squeaking stridor with inspiration  Respiratory: crackles anteriorly. + transmitted stridor  Cardiovascular:  RRR without M,G,R  Gastrointestinal: soft and non-tender; with positive bowel sounds. Musculoskeletal: warm without cyanosis. There is no lower extremity edema. Skin:  no jaundice or rashes, no wounds   Neurologic: no gross neuro deficits     Psychiatric:  alert and oriented x 3    CXR:       CT Neck soft tissue 6/24  1.  Soft tissue thickening in the subglottic region, resulting in airway  narrowing. Findings nonspecific but could be secondary to edema. 2. No peritonsillar or retropharyngeal fluid collection to suggest abscess. Epiglottis within normal limits. 3. Multiple thyroid nodules, largest in the left thyroid lobe measures up to 2.5  cm. Consider follow-up outpatient thyroid ultrasound to better evaluate. 4. Bilateral mastoid effusions, similar to prior exam.  5. Absence of IV contrast limits sensitivity. LAB  Recent Labs     06/27/21  0658 06/26/21  2244 06/26/21  2229 06/26/21  1647 06/26/21  1219   GLUCPOC 135* 152* 156* 134* 201*      Recent Labs     06/27/21  0433 06/26/21  0314 06/25/21  0510   WBC 8.8 8.9 8.9   HGB 7.7* 7.5* 7.4*   HCT 25.0* 24.1* 24.3*   * 118* 112*     Recent Labs     06/27/21  0433 06/26/21  0314 06/25/21  1343   * 148* 145   K 4.7 4.7 4.8   * 116* 114*   CO2 28 27 24   * 98 368*   BUN 67* 72* 71*   CREA 1.95* 1.81* 1.95*   CA 9.4 9.1 9.0     Recent Labs     06/25/21  0406   PHI 7.29*   PCO2I 52.7*   PO2I 113*   HCO3I 25.2     No results for input(s): LCAD, LAC in the last 72 hours. Assessment:  (Medical Decision Making)     Hospital Problems  Date Reviewed: 4/19/2021        Codes Class Noted POA    Stridor ICD-10-CM: R06.1  ICD-9-CM: 786.1  6/25/2021 Unknown        * (Principal) 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  ICD-9-CM: 478.30  6/25/2021 Unknown        Acute hypercapnic respiratory failure (HCC) ICD-10-CM: J96.02  ICD-9-CM: 518.81  6/24/2021 Unknown        Hyperkalemia ICD-10-CM: E87.5  ICD-9-CM: 276.7  5/5/2021 Yes        Insomnia (Chronic) ICD-10-CM: G47.00  ICD-9-CM: 780.52  6/15/2015 Yes              Plan:  (Medical Decision Making)     --Complete linezolid. --ENT evaluation appreciated. Will need 2-3 f/u with Dr. Ramiro Suarez upon discharge  --follow up with SLP regarding ability to start oral diet. --monitor FSBS on steroids. --has diastolic dysfunction and Group 2 PH.   Needs diuresis with active pulmonary edema. Would add low dose spironolactone when can take Po.  --SLP evaluation  --has h/o TANISHA and wasn't compliant with CPAP in past.  It is now more important that she wear CPAP every night. She agrees to resume therapy and adhere to these recommendations. She needs new equipment and we will need to coordinate with sleep lab tomorrow  --Full code    More than 50% of the time documented was spent in face-to-face contact with the patient and in the care of the patient on the floor/unit where the patient is located.     Gee Fowler MD

## 2021-06-27 NOTE — PROGRESS NOTES
Problem: Risk for Spread of Infection  Goal: Prevent transmission of infectious organism to others  Description: Prevent the transmission of infectious organisms to other patients, staff members, and visitors. Outcome: Progressing Towards Goal     Problem: Patient Education:  Go to Education Activity  Goal: Patient/Family Education  Outcome: Progressing Towards Goal     Problem: Pressure Injury - Risk of  Goal: *Prevention of pressure injury  Description: Document Ashu Scale and appropriate interventions in the flowsheet.   Outcome: Progressing Towards Goal  Note: Pressure Injury Interventions:  Sensory Interventions: Assess changes in LOC, Assess need for specialty bed    Moisture Interventions: Apply protective barrier, creams and emollients, Absorbent underpads    Activity Interventions: PT/OT evaluation, Pressure redistribution bed/mattress(bed type)    Mobility Interventions: HOB 30 degrees or less, Pressure redistribution bed/mattress (bed type), PT/OT evaluation    Nutrition Interventions: Discuss nutritional consult with provider    Friction and Shear Interventions: Foam dressings/transparent film/skin sealants, HOB 30 degrees or less

## 2021-06-27 NOTE — PROGRESS NOTES
[facility] OUTREACH NURSE PROGRESS REPORT      SUBJECTIVE: Called to assess patient secondary to transfer out of ICU. MEWS Score: 1 (06/26/21 1943)  Vitals:    06/26/21 2054 06/26/21 2105 06/26/21 2331 06/26/21 2357   BP:    (!) 180/93   Pulse:    83   Resp:    20   Temp:    98.1 °F (36.7 °C)   SpO2: 99% 98% 92% 100%   Weight:       Height:            LAB DATA:    Recent Labs     06/26/21 0314 06/25/21  1343 06/25/21  0510 06/24/21  0337 06/24/21  0337   * 145 144   < > 143   K 4.7 4.8 5.7*   < > 5.1   * 114* 115*   < > 114*   CO2 27 24 24   < > 22   AGAP 5* 7 5*   < > 7   GLU 98 368* 402*   < > 273*   BUN 72* 71* 68*   < > 59*   CREA 1.81* 1.95* 1.96*   < > 1.86*   GFRAA 34* 32* 31*   < > 33*   GFRNA 28* 26* 26*   < > 28*   CA 9.1 9.0 8.9   < > 8.8   MG  --   --   --   --  3.0*   ALB  --   --   --   --  3.2   TP  --   --   --   --  7.3   GLOB  --   --   --   --  4.1*   AGRAT  --   --   --   --  0.8*   ALT  --   --   --   --  32    < > = values in this interval not displayed. Recent Labs     06/26/21 0314 06/25/21  0510   WBC 8.9 8.9   HGB 7.5* 7.4*   HCT 24.1* 24.3*   * 112*          OBJECTIVE: On arrival to room, I found patient to be sleeping. Pain Assessment  Pain Intensity 1: 0 (06/26/21 1943)        Patient Stated Pain Goal: 0                                 ASSESSMENT:  Pt resting comfortably, no signs of distress at this time. VSS, labs, and chart reviewed. PLAN:  Will continue to follow up per outreach protocol.

## 2021-06-27 NOTE — PROGRESS NOTES
[facility] Progress Note     Name:  Funmilayo Adhikari  Age:81 y.o. Sex:female   :  1940    MRN:  [id number]     Admit Date:  2021    Reason for Admission:  Acute hypercapnic respiratory failure ([facility] [address] [facility] Course/Interval history:     Patient with past medical history of    Acute cystitis without hematuria (2019),   Acute pancreatitis (2019),   CAD (coronary artery disease),   Diabetic ketoacidosis ([facility] [address] (3/8/2020),   DM2 (diabetes mellitus, type 2) ([facility] [address]   Elevated liver function tests (2019),   Encephalopathy (2021),   Gout,   Gram-negative bacteremia (2019),   HLD (hyperlipidemia),   HTN (hypertension),   Peripheral neuropathy,   Right lower quadrant abdominal pain (2019). ,      Patient has a past surgical history that includes hx tubal ligation; hx coronary artery bypass graft; pr cardiac surg procedure unlist; hx cholecystectomy; and interventional radiology insert non tunl cvc over 5 yrs (2021). Patient had COVID 19 infection, had ARDS and prolonged ICU course s/p tracheostomy in 2021. Patient was discharged to [facility] and had slow recovery and tracheostomy was decanullated. Patient is wheel-chair bound however. She presented to hospital this time with stridor. She was admitted to ICU and on BiPAP. ENT saw patient and found patient to have vocal cord paralysis. She is being treated with steroid, Solumedrol 40 mg IV every 12 hours now. Subjective (21):    21   Patient is feeling the same compared to yesterday. Complaining of right knee pain. Review of Systems: 14 point review of systems is otherwise negative with the exception of the elements mentioned above. Assessment & Plan     Vocal cord paralysis   With stridor   On Solumedrol 40 mg iv every 12 hours. Will continue this. Patient and family would like to avoid intubation and tracheostomy. For modified barium swallow study.    Pulmonologist is following. Will follow advice. Chronic respiratory failure with hypercapnia   Patient has history of TANISHA. Plan for CPAP arrangement at home. Dysphagia   Pending barium swallow study   This is scheduled for Monday. Patient does not want to have NG tube feeding. Diastolic heart failure   On Furosemide 20 mg IV every 12 hours now. Patient is on Furosemide 20 mg pop every other day. Monitor BMP closely. On Aldactone. Hypertension  Monitor blood pressure and manage accordingly. Resume Metoprolol. Diabetes mellitus type 2  Monitor blood sugar. Cover with insulin sliding scale accordingly.                             Diet:  DIET NPO  DVT PPx: heparin SC   Code status: Full Code  Disposition/Expected LOS: 2-3 days               Objective:     Patient Vitals for the past 24 hrs:   Temp Pulse Resp BP SpO2   06/27/21 1111 98.3 °F (36.8 °C) 89 14 (!) 161/86 96 %   06/27/21 1101     90 %   06/27/21 0737 97.9 °F (36.6 °C) 92 17 (!) 161/77 95 %   06/27/21 0711     99 %   06/27/21 0400 98.3 °F (36.8 °C) 76 19 (!) 153/74 100 %   06/27/21 0349     99 %   06/27/21 0339     97 %   06/27/21 0000  78      06/26/21 2357 98.1 °F (36.7 °C) 83 20 (!) 180/93 100 %   06/26/21 2331     92 %   06/26/21 2105     98 %   06/26/21 2054     99 %   06/26/21 2000  81      06/26/21 1943 98 °F (36.7 °C) 86 20 (!) 178/77 98 %   06/26/21 1829 97.6 °F (36.4 °C) 82 20 (!) 174/87 99 %   06/26/21 1701  80 21 (!) 150/69 97 %   06/26/21 1631 98 °F (36.7 °C) 86 20 (!) 149/78 97 %   06/26/21 1601  81 18 (!) 166/77 96 %   06/26/21 1531  81 13 (!) 169/82 99 %   06/26/21 1501  82 15 (!) 169/81 99 %   06/26/21 1431  81 16 (!) 163/80 99 %   06/26/21 1401  81 26 (!) 159/72 99 %   06/26/21 1331  81 22 (!) 158/73 99 %   06/26/21 1301  85  (!) 148/68 99 %   06/26/21 1231  78  (!) 154/72 98 %   06/26/21 1201 97.7 °F (36.5 °C) 86 11 (!) 155/74 99 %     Oxygen Therapy  O2 Sat (%): 96 % (06/27/21 1111)  Pulse via Oximetry: 94 beats per minute (06/27/21 1101)  O2 Device: None (Room air) (06/27/21 1101)  Skin Assessment: Clean, dry, & intact (06/27/21 0400)  O2 Flow Rate (L/min): 2 l/min (06/27/21 0339)  FIO2 (%): 24 % (06/27/21 0711)    Body mass index is 24.98 kg/m². Physical Exam:   General:  No acute distress, no use of accessory muscles   High pitched voice and some occasional stridor with inspiration.     Lungs:  Decreased to auscultation bilaterally with some crackles and transmitted sound   CV:  Regular rate and rhythm with normal S1 and S2   Abdomen:  Soft, nontender, nondistended, normoactive bowel sounds   Extremities:  No cyanosis clubbing or edema   Neuro:  Nonfocal, A&O x3   Psych:  Normal affect     Data Review:  I have reviewed all labs, meds, and studies from the last 24 hours:    Labs:    Recent Results (from the past 24 hour(s))   GLUCOSE, POC    Collection Time: 06/26/21 12:19 PM   Result Value Ref Range    Glucose (POC) 201 (H) 65 - 100 mg/dL    Performed by Meyer (Pruitt)    GLUCOSE, POC    Collection Time: 06/26/21  4:47 PM   Result Value Ref Range    Glucose (POC) 134 (H) 65 - 100 mg/dL    Performed by Meyer (Pruitt)    GLUCOSE, POC    Collection Time: 06/26/21 10:29 PM   Result Value Ref Range    Glucose (POC) 156 (H) 65 - 100 mg/dL    Performed by Yamilet    GLUCOSE, POC    Collection Time: 06/26/21 10:44 PM   Result Value Ref Range    Glucose (POC) 152 (H) 65 - 100 mg/dL    Performed by Dayne    CBC W/O DIFF    Collection Time: 06/27/21  4:33 AM   Result Value Ref Range    WBC 8.8 4.3 - 11.1 K/uL    RBC 2.44 (L) 4.05 - 5.2 M/uL    HGB 7.7 (L) 11.7 - 15.4 g/dL    HCT 25.0 (L) 35.8 - 46.3 %    .5 (H) 79.6 - 97.8 FL    MCH 31.6 26.1 - 32.9 PG    MCHC 30.8 (L) 31.4 - 35.0 g/dL    RDW 14.9 (H) 11.9 - 14.6 %    PLATELET 178 (L) 272 - 450 K/uL    MPV 11.2 9.4 - 12.3 FL    ABSOLUTE NRBC 0.04 0.0 - 0.2 K/uL   METABOLIC PANEL, BASIC    Collection Time: 06/27/21  4:33 AM   Result Value Ref Range    Sodium 149 (H) 136 - 145 mmol/L    Potassium 4.7 3.5 - 5.1 mmol/L    Chloride 116 (H) 98 - 107 mmol/L    CO2 28 21 - 32 mmol/L    Anion gap 5 (L) 7 - 16 mmol/L    Glucose 122 (H) 65 - 100 mg/dL    BUN 67 (H) 8 - 23 MG/DL    Creatinine 1.95 (H) 0.6 - 1.0 MG/DL    GFR est AA 32 (L) >60 ml/min/1.73m2    GFR est non-AA 26 (L) >60 ml/min/1.73m2    Calcium 9.4 8.3 - 10.4 MG/DL   GLUCOSE, POC    Collection Time: 06/27/21  6:58 AM   Result Value Ref Range    Glucose (POC) 135 (H) 65 - 100 mg/dL    Performed by ParudiT    GLUCOSE, POC    Collection Time: 06/27/21 11:25 AM   Result Value Ref Range    Glucose (POC) 178 (H) 65 - 100 mg/dL    Performed by [email or web address]        All Micro Results     Procedure Component Value Units Date/Time    CULTURE, URINE [[id number]] Collected: 06/24/21 2235    Order Status: Completed Specimen: Cath Urine Updated: 06/27/21 0656     Special Requests: NO SPECIAL REQUESTS        Culture result:       1000 COLONIES/mL NORMAL SKIN REDD ISOLATED                EKG Results     Procedure 720 Value Units Date/Time    EKG [[id number]] Collected: 06/23/21 2247    Order Status: Completed Updated: 06/24/21 0657     Ventricular Rate 65 BPM      Atrial Rate 63 BPM      QRS Duration 92 ms      Q-T Interval 440 ms      QTC Calculation (Bezet) 457 ms      Calculated R Axis 31 degrees      Calculated T Axis 95 degrees      Diagnosis --     !! AGE AND GENDER SPECIFIC ECG ANALYSIS !! Normal sinus rhythm  Anterior infarct (cited on or before 16-APR-2021)  Abnormal ECG  When compared with ECG of 30-MAY-2021 19:48,  Junctional rhythm has replaced Sinus rhythm  Confirmed by Mani Lowery MD (), Bee Iyer ([id number]) on 6/24/2021 6:57:27 AM            Other Studies:  No results found.     Current Meds:   Current Facility-Administered Medications   Medication Dose Route Frequency    hydrALAZINE (APRESOLINE) 20 mg/mL injection 10 mg  10 mg IntraVENous Q6H PRN    linezolid in dextrose 5% (ZYVOX) IVPB premix in D5W 600 mg  600 mg IntraVENous Q12H    lip protectant (BLISTEX) ointment 1 Each  1 Each Topical PRN    spironolactone (ALDACTONE) tablet 12.5 mg  12.5 mg Oral DAILY    furosemide (LASIX) injection 20 mg  20 mg IntraVENous Q12H    methylPREDNISolone (PF) (SOLU-MEDROL) injection 40 mg  40 mg IntraVENous Q12H    insulin glargine (LANTUS) injection 12 Units  12 Units SubCUTAneous DAILY    nystatin (MYCOSTATIN) 100,000 unit/gram powder   Topical PRN    insulin lispro (HUMALOG) injection   SubCUTAneous AC&HS    sodium chloride (OCEAN) 0.65 % nasal squeeze bottle 2 Spray  2 Spray Both Nostrils Q2H PRN    sodium chloride (NS) flush 5-40 mL  5-40 mL IntraVENous PRN    acetaminophen (TYLENOL) tablet 650 mg  650 mg Oral Q6H PRN    Or    acetaminophen (TYLENOL) suppository 650 mg  650 mg Rectal Q6H PRN    polyethylene glycol (MIRALAX) packet 17 g  17 g Oral DAILY PRN    ondansetron (ZOFRAN ODT) tablet 4 mg  4 mg Oral Q8H PRN    Or    ondansetron (ZOFRAN) injection 4 mg  4 mg IntraVENous Q6H PRN    budesonide (PULMICORT) 500 mcg/2 ml nebulizer suspension  500 mcg Nebulization BID RT    albuterol-ipratropium (DUO-NEB) 2.5 MG-0.5 MG/3 ML  3 mL Nebulization Q4H RT    heparin (porcine) injection 5,000 Units  5,000 Units SubCUTAneous Q12H    alcohol 62% (NOZIN) nasal  1 Ampule  1 Ampule Topical Q12H    sodium chloride (NS) flush 5-10 mL  5-10 mL IntraVENous Q8H    sodium chloride (NS) flush 5-10 mL  5-10 mL IntraVENous PRN       Problem List:  Hospital Problems as of 6/27/2021 Date Reviewed: 4/19/2021        Codes Class Noted - Resolved POA    Stridor ICD-10-CM: R06.1  ICD-9-CM: 786.1  6/25/2021 - Present Unknown        * (Principal) 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  ICD-9-CM: 478.30  6/25/2021 - Present Unknown        Acute hypercapnic respiratory failure (HCC) ICD-10-CM: J96.02  ICD-9-CM: 518.81  6/24/2021 - Present Unknown        Hyperkalemia ICD-10-CM: E87.5  ICD-9-CM: 276.7  5/5/2021 - Present Yes        Insomnia (Chronic) ICD-10-CM: G47.00  ICD-9-CM: 780.52  6/15/2015 - Present Yes                   Part of this note was written by using a voice dictation software and the note has been proof read but may still contain some grammatical/other typographical errors.     Signed By: Maurice Carmona MD   [facility] Hospitalist Service    June 27, 2021  5:15 PM

## 2021-06-27 NOTE — PROGRESS NOTES
Date of Outreach Update:  Gaetano Patterson was seen and assessed. MEWS Score: 1 (06/27/21 0400)  Vitals:    06/27/21 0400 06/27/21 0504 06/27/21 0711 06/27/21 0737   BP: (!) 153/74   (!) 161/77   Pulse: 76   92   Resp: 19   17   Temp: 98.3 °F (36.8 °C)   97.9 °F (36.6 °C)   SpO2: 100%  99% 95%   Weight:  68.1 kg (150 lb 2.1 oz)     Height:             Pain Assessment  Pain Intensity 1: 0 (06/27/21 0400)        Patient Stated Pain Goal: 0      Previous Outreach assessment has been reviewed. There have been no significant clinical changes since the completion of the last dated Outreach assessment. Patient lying in bed, VSS, in no acute distress. Complains of hunger. Tolerating bipap qHS. No needs from outreach. Nurse will notify outreach of changes in patient condition. Will continue to follow up per outreach protocol.     Signed By:   Guadalupe Rees RN    June 27, 2021 9:40 AM

## 2021-06-27 NOTE — PROGRESS NOTES
Date of Outreach Update:  Agustin Dowling was seen and assessed. MEWS Score: 1 (06/26/21 1943)  Vitals:    06/27/21 0339 06/27/21 0349 06/27/21 0400 06/27/21 0504   BP:   (!) 153/74    Pulse:   76    Resp:   19    Temp:   98.3 °F (36.8 °C)    SpO2: 97% 99% 100%    Weight:    68.1 kg (150 lb 2.1 oz)   Height:             Pain Assessment  Pain Intensity 1: 0 (06/26/21 1943)        Patient Stated Pain Goal: 0      Previous Outreach assessment has been reviewed. There have been no significant clinical changes since the completion of the last dated Outreach assessment. Will continue to follow up per outreach protocol.     Signed By:   Colby Pate RN    June 27, 2021 5:39 AM

## 2021-06-27 NOTE — PROGRESS NOTES
Date of Outreach Update:  Kaya Bob was seen and assessed. MEWS Score: 1 (06/27/21 0737)  Vitals:    06/27/21 1101 06/27/21 1111 06/27/21 1526 06/27/21 1534   BP:  (!) 161/86 (!) 154/75    Pulse:  89 86    Resp:  14 17    Temp:  98.3 °F (36.8 °C) (!) 47.3 °F (8.5 °C)    SpO2: 90% 96% 93% 92%   Weight:       Height:             Pain Assessment  Pain Intensity 1: 0 (06/27/21 0400)        Patient Stated Pain Goal: 0      Previous Outreach assessment has been reviewed. There have been no significant clinical changes since the completion of the last dated Outreach assessment. Patient sleeping in bed. VSS. No needs from outreach at this time. Nurse will notify outreach of changes in patient condition. Will continue to follow up per outreach protocol.     Signed By:   Rc Hay RN    June 27, 2021 5:08 PM

## 2021-06-27 NOTE — PROGRESS NOTES
Shalom Luna  Admission Date: 6/23/2021             Daily Progress Note: 6/27/2021    The patient's chart is reviewed and the patient is discussed with the staff. Shalom Luna is an 78yoF who had a prolonged recovery from COVID-19 pneumonia with ARDS and was intubated from 12/30-1/19. She was decannulated at [facility] (after 3/9 discharge from [facility]). She also has h/o CAD, HTN, gastritis with UGIB, TANISHA (was non-compliant with CPAP at home) multiple infections (stenotrophomonas VAP, VRE UTI). ENT evaluation also suggestive of bilateral vocal cord paralysis with fixed paramedian position with posterior gap. Recommended further steroid taper over next 1-2 weeks with humidified oxygen and f/u in office in 2-3 weeks with Dr Kimberly Zarate. Subjective: This morning her stridor is improved and her voice is stronger. Speech has not cleared her for oral intake yet. Hemoglobin is stable and afebrile. Asking for some pain medication for her R leg. No redness, swelling, or warmth to site. She states it hurts sometimes, even at home. Informed RN.      Current Facility-Administered Medications   Medication Dose Route Frequency    hydrALAZINE (APRESOLINE) 20 mg/mL injection 10 mg  10 mg IntraVENous Q6H PRN    lip protectant (BLISTEX) ointment 1 Each  1 Each Topical PRN    spironolactone (ALDACTONE) tablet 12.5 mg  12.5 mg Oral DAILY    furosemide (LASIX) injection 20 mg  20 mg IntraVENous Q12H    methylPREDNISolone (PF) (SOLU-MEDROL) injection 40 mg  40 mg IntraVENous Q12H    insulin glargine (LANTUS) injection 12 Units  12 Units SubCUTAneous DAILY    nystatin (MYCOSTATIN) 100,000 unit/gram powder   Topical PRN    cefTRIAXone (ROCEPHIN) 1 g in 0.9% sodium chloride (MBP/ADV) 50 mL MBP  1 g IntraVENous Q24H    insulin lispro (HUMALOG) injection   SubCUTAneous AC&HS    linezolid in dextrose 5% (ZYVOX) IVPB premix in D5W 600 mg  600 mg IntraVENous Q12H    sodium chloride (OCEAN) 0.65 % nasal squeeze bottle 2 Spray  2 Spray Both Nostrils Q2H PRN    sodium chloride (NS) flush 5-40 mL  5-40 mL IntraVENous PRN    acetaminophen (TYLENOL) tablet 650 mg  650 mg Oral Q6H PRN    Or    acetaminophen (TYLENOL) suppository 650 mg  650 mg Rectal Q6H PRN    polyethylene glycol (MIRALAX) packet 17 g  17 g Oral DAILY PRN    ondansetron (ZOFRAN ODT) tablet 4 mg  4 mg Oral Q8H PRN    Or    ondansetron (ZOFRAN) injection 4 mg  4 mg IntraVENous Q6H PRN    budesonide (PULMICORT) 500 mcg/2 ml nebulizer suspension  500 mcg Nebulization BID RT    albuterol-ipratropium (DUO-NEB) 2.5 MG-0.5 MG/3 ML  3 mL Nebulization Q4H RT    heparin (porcine) injection 5,000 Units  5,000 Units SubCUTAneous Q12H    alcohol 62% (NOZIN) nasal  1 Ampule  1 Ampule Topical Q12H    sodium chloride (NS) flush 5-10 mL  5-10 mL IntraVENous Q8H    sodium chloride (NS) flush 5-10 mL  5-10 mL IntraVENous PRN       Review of Systems  Constitutional: negative for fever, chills, sweats  Cardiovascular: negative for chest pain, palpitations, syncope, edema  Gastrointestinal:  negative for dysphagia, reflux, vomiting, diarrhea, abdominal pain, or melena  Neurologic:  negative for focal weakness, numbness, headache    Objective:     Vitals:    06/27/21 0400 06/27/21 0504 06/27/21 0711 06/27/21 0737   BP: (!) 153/74   (!) 161/77   Pulse: 76   92   Resp: 19   17   Temp: 98.3 °F (36.8 °C)   97.9 °F (36.6 °C)   SpO2: 100%  99% 95%   Weight:  150 lb 2.1 oz (68.1 kg)     Height:             Intake/Output Summary (Last 24 hours) at 6/27/2021 1057  Last data filed at 6/27/2021 0400  Gross per 24 hour   Intake 600 ml   Output 1595 ml   Net -995 ml       Physical Exam:   Constitution:  the patient is well developed and in no acute distress  EENMT:  Sclera clear, pupils equal, oral mucosa moist.  High pitched voice and squeaking stridor with inspiration (seems improved)   Respiratory: diminished crackles anteriorly.   + transmitted stridor  Cardiovascular:  RRR without M,G,R  Gastrointestinal: soft and non-tender; with positive bowel sounds. Musculoskeletal: warm without cyanosis. There is no lower extremity edema. Skin:  no jaundice or rashes, no wounds   Neurologic: no gross neuro deficits     Psychiatric:  alert and oriented x 3    CXR:       CT Neck soft tissue 6/24  1. Soft tissue thickening in the subglottic region, resulting in airway  narrowing. Findings nonspecific but could be secondary to edema. 2. No peritonsillar or retropharyngeal fluid collection to suggest abscess. Epiglottis within normal limits. 3. Multiple thyroid nodules, largest in the left thyroid lobe measures up to 2.5  cm. Consider follow-up outpatient thyroid ultrasound to better evaluate. 4. Bilateral mastoid effusions, similar to prior exam.  5. Absence of IV contrast limits sensitivity. LAB  Recent Labs     06/27/21  0658 06/26/21  2244 06/26/21  2229 06/26/21  1647 06/26/21  1219   GLUCPOC 135* 152* 156* 134* 201*      Recent Labs     06/27/21  0433 06/26/21  0314 06/25/21  0510   WBC 8.8 8.9 8.9   HGB 7.7* 7.5* 7.4*   HCT 25.0* 24.1* 24.3*   * 118* 112*     Recent Labs     06/27/21  0433 06/26/21  0314 06/25/21  1343   * 148* 145   K 4.7 4.7 4.8   * 116* 114*   CO2 28 27 24   * 98 368*   BUN 67* 72* 71*   CREA 1.95* 1.81* 1.95*   CA 9.4 9.1 9.0     Recent Labs     06/25/21  0406   PHI 7.29*   PCO2I 52.7*   PO2I 113*   HCO3I 25.2     No results for input(s): LCAD, LAC in the last 72 hours.       Assessment:  (Medical Decision Making)     Hospital Problems  Date Reviewed: 4/19/2021        Codes Class Noted POA    Stridor ICD-10-CM: R06.1  ICD-9-CM: 786.1  6/25/2021 Unknown        * (Principal) 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  ICD-9-CM: 478.30  6/25/2021 Unknown        Acute hypercapnic respiratory failure (HCC) ICD-10-CM: J96.02  ICD-9-CM: 518.81  6/24/2021 Unknown        Hyperkalemia ICD-10-CM: E87.5  ICD-9-CM: 276.7 5/5/2021 Yes        Insomnia (Chronic) ICD-10-CM: G47.00  ICD-9-CM: 780.52  6/15/2015 Yes              Plan:  (Medical Decision Making)   --hospitalist now primary   --urine culture with normal farheen, d/c ABX therapy   --ENT evaluation appreciated. Will need 2-3 f/u with Dr. Rivka Lopez upon discharge  --follow up with SLP regarding ability to start oral diet, still recommending NPO at present  --has diastolic dysfunction and Group 2 PH. Needs diuresis with active pulmonary edema. Would plan to add low dose spironolactone when can take Po.  --SLP evaluation  --has h/o TANISHA and wasn't compliant with CPAP in past.  It is now more important that she wear CPAP every night. She agrees to resume therapy and adhere to these recommendations. She states she does not have a CPAP at home at present? May need to be worked up for new one if she doesn't. --Full code    More than 50% of the time documented was spent in face-to-face contact with the patient and in the care of the patient on the floor/unit where the patient is located.     Nona Servin NP

## 2021-06-28 PROBLEM — G47.33 OSA (OBSTRUCTIVE SLEEP APNEA): Chronic | Status: ACTIVE | Noted: 2021-01-01

## 2021-06-28 PROBLEM — E87.5 HYPERKALEMIA: Status: RESOLVED | Noted: 2021-01-01 | Resolved: 2021-01-01

## 2021-06-28 PROBLEM — G47.33 OSA (OBSTRUCTIVE SLEEP APNEA): Status: ACTIVE | Noted: 2021-01-01

## 2021-06-28 NOTE — PROGRESS NOTES
Chart review complete. [facility] at their facility. If she will require therapy when she returns to facility then it will require therapy evals and a precert. RUT following. 1537: Facility would like for the pt to return under her rehab benefit. PT/OT consulted per MD order. RUT following.

## 2021-06-28 NOTE — PROGRESS NOTES
Problem: Risk for Spread of Infection  Goal: Prevent transmission of infectious organism to others  Description: Prevent the transmission of infectious organisms to other patients, staff members, and visitors. Outcome: Progressing Towards Goal     Problem: Patient Education:  Go to Education Activity  Goal: Patient/Family Education  Outcome: Progressing Towards Goal     Problem: Pressure Injury - Risk of  Goal: *Prevention of pressure injury  Description: Document Ashu Scale and appropriate interventions in the flowsheet. Outcome: Progressing Towards Goal  Note: Pressure Injury Interventions:  Sensory Interventions: Discuss PT/OT consult with provider, Float heels    Moisture Interventions: Absorbent underpads, Minimize layers    Activity Interventions: Chair cushion, PT/OT evaluation    Mobility Interventions: Assess need for specialty bed, HOB 30 degrees or less, PT/OT evaluation, Float heels    Nutrition Interventions: Document food/fluid/supplement intake    Friction and Shear Interventions: HOB 30 degrees or less, Minimize layers                Problem: Patient Education: Go to Patient Education Activity  Goal: Patient/Family Education  Outcome: Progressing Towards Goal     Problem: Falls - Risk of  Goal: *Absence of Falls  Description: Document Hood Fall Risk and appropriate interventions in the flowsheet.   Outcome: Progressing Towards Goal  Note: Fall Risk Interventions:  Mobility Interventions: Bed/chair exit alarm, OT consult for ADLs, PT Consult for mobility concerns    Mentation Interventions: Bed/chair exit alarm    Medication Interventions: Bed/chair exit alarm, Patient to call before getting OOB    Elimination Interventions: Bed/chair exit alarm, Call light in reach              Problem: Patient Education: Go to Patient Education Activity  Goal: Patient/Family Education  Outcome: Progressing Towards Goal     Problem: Delirium Treatment  Goal: *Level of consciousness restored to baseline  Outcome: Progressing Towards Goal  Goal: *Level of environmental perceptions restored to baseline  Outcome: Progressing Towards Goal  Goal: *Sensory perception restored to baseline  Outcome: Progressing Towards Goal  Goal: *Emotional stability restored to baseline  Outcome: Progressing Towards Goal  Goal: *Functional assessment restored to baseline  Outcome: Progressing Towards Goal  Goal: *Absence of falls  Outcome: Progressing Towards Goal  Goal: *Will remain free of delirium, CAM Score negative  Outcome: Progressing Towards Goal  Goal: *Cognitive status will be restored to baseline  Outcome: Progressing Towards Goal  Goal: Interventions  Outcome: Progressing Towards Goal     Problem: Patient Education: Go to Patient Education Activity  Goal: Patient/Family Education  Outcome: Progressing Towards Goal

## 2021-06-28 NOTE — WOUND CARE
Patient seen for moisture/fricion damage to buttocks at gluteal cleft fold no open area noted. Zinc barrier cream applied at this time. Turned to left side and repositioned. Heels intact and without redness. Heel protector boots in place. Primary nurse at bedside and assisted. Will follow loosely.

## 2021-06-28 NOTE — PROGRESS NOTES
Date of Outreach Update:  Kindra Ash was seen and assessed. MEWS Score: 1 (06/28/21 1645)  Vitals:    06/28/21 0727 06/28/21 1104 06/28/21 1148 06/28/21 1645   BP:  (!) 178/94  (!) 164/87   Pulse:  77  75   Resp:  22  19   Temp:  97.1 °F (36.2 °C)  97.3 °F (36.3 °C)   SpO2: 96% (!) 89% 92% 90%   Weight:       Height:             Pain Assessment  Pain Intensity 1: 0 (06/28/21 1003)  Pain Location 1: Leg  Pain Intervention(s) 1: Medication (see MAR), Position  Patient Stated Pain Goal: 0      Previous Outreach assessment has been reviewed. There have been no significant clinical changes since the completion of the last dated Outreach assessment. Will continue to follow up per outreach protocol.     Signed By:   Gali Núñez RN    June 28, 2021 4:00 PM

## 2021-06-28 NOTE — PROGRESS NOTES
Hourly rounds completed on patient. All needs met at this time. Call light within reach. Bed wheels are locked in low position. Bed alarm on.

## 2021-06-28 NOTE — PROGRESS NOTES
Ebonie Perez  Admission Date: 6/23/2021             Daily Progress Note: 6/28/2021    The patient's chart is reviewed and the patient is discussed with the staff. Ebonie Perez is an 78yoF who had a prolonged recovery from COVID-19 pneumonia with ARDS and was intubated from 12/30-1/19. She was decannulated at [facility] (after 3/9 discharge from [facility]). She also has h/o CAD, HTN, gastritis with UGIB, multiple infections (stenotrophomonas VAP, VRE UTI). 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 Thus far have been able to manage with CPAP/BiPAP and steroids. ENT has seen and plans short-term outpatient follow up to decide about trach. Subjective:      On RA with sat of 98%  Plans for MBS, Wearing BiPAP Q HS      Current Facility-Administered Medications   Medication Dose Route Frequency    [Held by provider] furosemide (LASIX) injection 20 mg  20 mg IntraVENous DAILY    [START ON 6/29/2021] methylPREDNISolone (PF) (SOLU-MEDROL) injection 40 mg  40 mg IntraVENous DAILY    dextrose 5% infusion  50 mL/hr IntraVENous CONTINUOUS    oxyCODONE IR (ROXICODONE) tablet 5 mg  5 mg Oral Q6H PRN    hydrALAZINE (APRESOLINE) 20 mg/mL injection 10 mg  10 mg IntraVENous Q6H PRN    methyl salicylate-menthol (BENGAY) 15-10 % cream   Topical TID PRN    metoprolol succinate (TOPROL-XL) XL tablet 12.5 mg  12.5 mg Oral DAILY    lip protectant (BLISTEX) ointment 1 Each  1 Each Topical PRN    spironolactone (ALDACTONE) tablet 12.5 mg  12.5 mg Oral DAILY    insulin glargine (LANTUS) injection 12 Units  12 Units SubCUTAneous DAILY    nystatin (MYCOSTATIN) 100,000 unit/gram powder   Topical PRN    insulin lispro (HUMALOG) injection   SubCUTAneous AC&HS    sodium chloride (OCEAN) 0.65 % nasal squeeze bottle 2 Spray  2 Spray Both Nostrils Q2H PRN    sodium chloride (NS) flush 5-40 mL  5-40 mL IntraVENous PRN    acetaminophen (TYLENOL) tablet 650 mg  650 mg Oral Q6H PRN    Or    acetaminophen (TYLENOL) suppository 650 mg  650 mg Rectal Q6H PRN    polyethylene glycol (MIRALAX) packet 17 g  17 g Oral DAILY PRN    ondansetron (ZOFRAN ODT) tablet 4 mg  4 mg Oral Q8H PRN    Or    ondansetron (ZOFRAN) injection 4 mg  4 mg IntraVENous Q6H PRN    budesonide (PULMICORT) 500 mcg/2 ml nebulizer suspension  500 mcg Nebulization BID RT    albuterol-ipratropium (DUO-NEB) 2.5 MG-0.5 MG/3 ML  3 mL Nebulization Q4H RT    heparin (porcine) injection 5,000 Units  5,000 Units SubCUTAneous Q12H    alcohol 62% (NOZIN) nasal  1 Ampule  1 Ampule Topical Q12H    sodium chloride (NS) flush 5-10 mL  5-10 mL IntraVENous Q8H    sodium chloride (NS) flush 5-10 mL  5-10 mL IntraVENous PRN       Review of Systems  Constitutional: negative for fever, chills, sweats  Cardiovascular: negative for chest pain, palpitations, syncope, edema  Gastrointestinal:  negative for dysphagia, reflux, vomiting, diarrhea, abdominal pain, or melena  Neurologic:  negative for focal weakness, numbness, headache    Objective:     Vitals:    06/28/21 0452 06/28/21 0552 06/28/21 0714 06/28/21 0727   BP:   (!) 163/78    Pulse:   86    Resp:   15    Temp:   98.3 °F (36.8 °C)    SpO2: 99%  90% 96%   Weight:  152 lb 6.4 oz (69.1 kg)     Height:             Intake/Output Summary (Last 24 hours) at 6/28/2021 1119  Last data filed at 6/28/2021 0800  Gross per 24 hour   Intake 30 ml   Output 1600 ml   Net -1570 ml       Physical Exam:   Constitution:  the patient is well developed and in no acute distress  EENMT:   Respiratory:   Cardiovascular:  RRR without M,G,R   Gastrointestinal: soft and non-tender; with positive bowel sounds. Musculoskeletal: warm without cyanosis. There is no lower extremity edema. Skin:  no jaundice or rashes, no wounds   Neurologic: no gross neuro deficits, alert and oriented x 3    CXR:       CT Neck soft tissue 6/24  1.  Soft tissue thickening in the subglottic region, resulting in airway  narrowing. Findings nonspecific but could be secondary to edema. 2. No peritonsillar or retropharyngeal fluid collection to suggest abscess. Epiglottis within normal limits. 3. Multiple thyroid nodules, largest in the left thyroid lobe measures up to 2.5  cm. Consider follow-up outpatient thyroid ultrasound to better evaluate. 4. Bilateral mastoid effusions, similar to prior exam.  5. Absence of IV contrast limits sensitivity. LAB  Recent Labs     06/28/21  1045 06/28/21  0619 06/27/21  2058 06/27/21  1513 06/27/21  1125   GLUCPOC 160* 145* 144* 178* 178*      Recent Labs     06/27/21  0433 06/26/21  0314   WBC 8.8 8.9   HGB 7.7* 7.5*   HCT 25.0* 24.1*   * 118*     Recent Labs     06/28/21  0506 06/27/21  0433 06/26/21  0314   * 149* 148*   K 4.7 4.7 4.7   * 116* 116*   CO2 29 28 27   * 122* 98   BUN 69* 67* 72*   CREA 2.07* 1.95* 1.81*   CA 9.4 9.4 9.1       Assessment:  (Medical Decision Making)     Hospital Problems  Date Reviewed: 6/28/2021        Codes Class Noted POA    TANISHA (obstructive sleep apnea) (Chronic) ICD-10-CM: G47.33  ICD-9-CM: 327.23  6/28/2021 Yes        Stridor ICD-10-CM: R06.1  ICD-9-CM: 786.1  6/25/2021 Yes        * (Principal) 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  ICD-9-CM: 478.30  6/25/2021 Yes        Acute hypercapnic respiratory failure (HCC) ICD-10-CM: J96.02  ICD-9-CM: 518.81  6/24/2021 Yes        Insomnia (Chronic) ICD-10-CM: G47.00  ICD-9-CM: 780.52  6/15/2015 Yes            Here with vocal cord paralysis. Seen by speech and MBS completed. Hx TANISHA and wasn't compliant. Now agreeable. She has diastolic dysfunction and Group 2 PH- diuresing and added low dose spironolactone. Plan:  (Medical Decision Making)     -- Will need  f/u with Dr. Doherty  upon discharge  -- speech following, MBS planned   -- convert to oral steroids once able to tolerate PO. Change to daily dosing  -- lasix held, on low dose aldactone -watch Na+ and Cr. Add D5.   Stop 1/2 NS  -- watch BS, steroids decreased to daily  -- ask Carlton thomason for home CPAP. She agrees to resume therapy and adhere to these recommendations. She needs new equipment. -- Office follow up in the sleep lab arranged  -- She is on RA      More than 50% of the time documented was spent in face-to-face contact with the patient and in the care of the patient on the floor/unit where the patient is located. Major Lopez NP    The patient has been seen and examined by me personally, the chart,labs, and radiographic studies have been reviewed. Chest: MBS done, no aspiration noted, stridor has improved considerably  Extremities: trace edema    I agree with the above assessment and plan.     Yves Ng MD.

## 2021-06-28 NOTE — PROGRESS NOTES
[facility] 77 CRITICAL CARE OUTREACH NURSE PROGRESS REPORT      SUBJECTIVE: Called to assess patient secondary to [employer] protocol. MEWS Score: 1 (06/28/21 4168)  Vitals:    06/28/21 0857 06/28/21 0552 06/28/21 0714 06/28/21 0727   BP:   (!) 163/78    Pulse:   86    Resp:   15    Temp:   98.3 °F (36.8 °C)    SpO2: 99%  90% 96%   Weight:  69.1 kg (152 lb 6.4 oz)     Height:          EKG: normal EKG, normal sinus rhythm, unchanged from previous tracings. LAB DATA:    Recent Labs     06/28/21  0506 06/27/21  0433 06/26/21  0314   * 149* 148*   K 4.7 4.7 4.7   * 116* 116*   CO2 29 28 27   AGAP 6* 5* 5*   * 122* 98   BUN 69* 67* 72*   CREA 2.07* 1.95* 1.81*   GFRAA 29* 32* 34*   GFRNA 24* 26* 28*   CA 9.4 9.4 9.1        Recent Labs     06/27/21  0433 06/26/21  0314   WBC 8.8 8.9   HGB 7.7* 7.5*   HCT 25.0* 24.1*   * 118*          OBJECTIVE: On arrival to room, I found patient to be lying in bed watching tv with family at bedside, no distress noted. Pain Assessment  Pain Intensity 1: 0 (06/28/21 0105)  Pain Location 1: Leg  Pain Intervention(s) 1: Medication (see MAR), Position  Patient Stated Pain Goal: 0                                 ASSESSMENT:  Patient is awake and alert, conversant and able to follow commands. HR 80, lugs clear/diminished bilaterally on RA. O2sat 85% on RA, pt placed on 2L NC and now O2sat 95%. Patient c/o leg pain and daughter at bedside wanted me to check her potassium level which is normal this AM. Pt has a history of neuropathy and takes gabapentin at home which has not been given. Could be contributing to pain in LE. PLAN:  Will continue to follow per outreach program protocol.      Dylan Moctezuma RN

## 2021-06-28 NOTE — PROGRESS NOTES
Date of Outreach Update:  Clemencia Lynne was seen and assessed. MEWS Score: 1 (06/27/21 2306)  Vitals:    06/27/21 2306 06/27/21 2307 06/27/21 2327 06/27/21 2346   BP: (!) 165/91      Pulse: 86   79   Resp: 18      Temp: 98.1 °F (36.7 °C)      SpO2: 100% 99% 99%    Weight:       Height:             Pain Assessment  Pain Intensity 1: 0 (06/28/21 0105)  Pain Location 1: Leg  Pain Intervention(s) 1: Medication (see MAR), Position  Patient Stated Pain Goal: 0    Previous Outreach assessment has been reviewed. There have been no significant clinical changes since the completion of the last dated Outreach assessment. Pt resting quietly, primary RN has no concerns at this time. On 1L NC, will switch to BiPAP later tonight. Will continue to follow up per outreach protocol.     Signed By:   Wendy Carey    June 28, 2021 2:42 AM

## 2021-06-28 NOTE — PROGRESS NOTES
Date of Outreach Update:  Nirmal Barr was seen and assessed. MEWS Score: 1 (06/28/21 0311)  Vitals:    06/28/21 0400 06/28/21 0428 06/28/21 0452 06/28/21 0552   BP:  (!) 167/83     Pulse: 81      Resp:       Temp:       SpO2:   99%    Weight:    69.1 kg (152 lb 6.4 oz)   Height:             Pain Assessment  Pain Intensity 1: 0 (06/28/21 0105)  Pain Location 1: Leg  Pain Intervention(s) 1: Medication (see MAR), Position  Patient Stated Pain Goal: 0    Previous Outreach assessment has been reviewed. There have been no significant clinical changes since the completion of the last dated Outreach assessment. Pt A&O this AM on RA. Complaints of B leg pain, which she claims is a chronic issue. Being treated with prn pain meds as needed. Will continue to follow up per outreach protocol.     Signed By:   Aparna Parker    June 28, 2021 6:43 AM

## 2021-06-28 NOTE — PROGRESS NOTES
Unique Hospitalist Progress Note     Name:  Calli Holland  Age:81 y.o. Sex:female   :  1940    MRN:  [id number]     Admit Date:  2021    Reason for Admission:  Acute hypercapnic respiratory failure ([facility] [address] [facility] Course/Interval history:     Patient with past medical history of    Acute cystitis without hematuria (2019),   Acute pancreatitis (2019),   CAD (coronary artery disease),   Diabetic ketoacidosis ([facility] [address] (3/8/2020),   DM2 (diabetes mellitus, type 2) ([facility] [address]   Elevated liver function tests (2019),   Encephalopathy (2021),    Gout,   Gram-negative bacteremia (2019),   HLD (hyperlipidemia),   HTN (hypertension),   Peripheral neuropathy,   Right lower quadrant abdominal pain (2019). ,      Patient has a past surgical history that includes hx tubal ligation; hx coronary artery bypass graft; pr cardiac surg procedure unlist; hx cholecystectomy; and interventional radiology insert non tunl cvc over 5 yrs (2021). Patient had COVID 19 infection, had ARDS and prolonged ICU course s/p tracheostomy in 2021. Patient was discharged to [facility] and had slow recovery and tracheostomy was decanullated. Patient is wheel-chair bound however. She presented to hospital this time with stridor. She was admitted to ICU and on BiPAP. ENT saw patient and found patient to have vocal cord paralysis. She is being treated with steroid, Solumedrol 40 mg IV every 12 hours now. Subjective (21):    21   Patient is feeling the same compared to yesterday. Complaining of right knee pain. 21   Patient reports no troubles with breathing. No fever. No shaking. No chills. No shortness of breath. Patient is to go for barium swallow study today. Review of Systems: 14 point review of systems is otherwise negative with the exception of the elements mentioned above.   Assessment & Plan     Vocal cord paralysis   With stridor   On Solumedrol 40 mg iv every 12 hours. Will continue this. Patient and family would like to avoid intubation and tracheostomy. For modified barium swallow study today. Pulmonologist is following. Will follow advice. Chronic respiratory failure with hypercapnia   Patient has history of TANISHA. Plan for CPAP arrangement at home. Dysphagia   Pending barium swallow study   This is scheduled for Monday. Patient does not want to have NG tube feeding. Diastolic heart failure   On Furosemide 20 mg IV every 12 hours now. Patient was on Furosemide 20 mg po every other day before admission. Creatinine started to go up. Will hold Lasix. Patient is NPO now and will have swallow study. Will give gentle IV hydration. On Aldactone. Monitor closely. Hypertension  Monitor blood pressure and manage accordingly. On Metoprolol. Diabetes mellitus type 2  Monitor blood sugar. Cover with insulin sliding scale accordingly. Blood sugar is in mid 100's ranges and acceptable.      Diet:  DIET NPO  DVT PPx: heparin SC   Code status: Full Code  Disposition/Expected LOS: 2-3 days               Objective:     Patient Vitals for the past 24 hrs:   Temp Pulse Resp BP SpO2   06/28/21 0727     96 %   06/28/21 0714 98.3 °F (36.8 °C) 86 15 (!) 163/78 90 %   06/28/21 0452     99 %   06/28/21 0428    (!) 167/83    06/28/21 0400  81      06/28/21 0311 98.2 °F (36.8 °C) 89 16 (!) 179/92 98 %   06/27/21 2346  79      06/27/21 2327     99 %   06/27/21 2307     99 %   06/27/21 2306 98.1 °F (36.7 °C) 86 18 (!) 165/91 100 %   06/27/21 2051     99 %   06/27/21 2043     91 %   06/27/21 2004 98.2 °F (36.8 °C) 90 16 (!) 156/82 90 %   06/27/21 2000  78      06/27/21 1534     92 %   06/27/21 1526 97.3 °F (36.3 °C) 86 17 (!) 154/75 93 %   06/27/21 1111 98.3 °F (36.8 °C) 89 14 (!) 161/86 96 %   06/27/21 1101     90 %     Oxygen Therapy  O2 Sat (%): 96 % (06/28/21 0727)  Pulse via Oximetry: 106 beats per minute (06/28/21 0727)  O2 Device: None (Room air) (06/28/21 0727)  Skin Assessment: Clean, dry, & intact (06/28/21 0311)  O2 Flow Rate (L/min): 1 l/min (06/27/21 2307)  FIO2 (%): 24 % (06/27/21 0711)    Body mass index is 25.36 kg/m². Physical Exam:   General:  No acute distress, no use of accessory muscles   High pitched voice and some occasional stridor with inspiration. Less compared with yesterday.    Lungs:  Decreased to auscultation bilaterally with some crackles and transmitted sound   CV:  Regular rate and rhythm with normal S1 and S2   Abdomen:  Soft, nontender, nondistended, normoactive bowel sounds   Extremities:  No cyanosis clubbing or edema   Neuro:  Nonfocal, A&O x3   Psych:  Normal affect     Data Review:  I have reviewed all labs, meds, and studies from the last 24 hours:    Labs:    Recent Results (from the past 24 hour(s))   GLUCOSE, POC    Collection Time: 06/27/21 11:25 AM   Result Value Ref Range    Glucose (POC) 178 (H) 65 - 100 mg/dL    Performed by [employer]    GLUCOSE, POC    Collection Time: 06/27/21  3:13 PM   Result Value Ref Range    Glucose (POC) 178 (H) 65 - 100 mg/dL    Performed by [employer]    GLUCOSE, POC    Collection Time: 06/27/21  8:58 PM   Result Value Ref Range    Glucose (POC) 144 (H) 65 - 100 mg/dL    Performed by [facility]    METABOLIC PANEL, BASIC    Collection Time: 06/28/21  5:06 AM   Result Value Ref Range    Sodium 148 (H) 136 - 145 mmol/L    Potassium 4.7 3.5 - 5.1 mmol/L    Chloride 113 (H) 98 - 107 mmol/L    CO2 29 21 - 32 mmol/L    Anion gap 6 (L) 7 - 16 mmol/L    Glucose 162 (H) 65 - 100 mg/dL    BUN 69 (H) 8 - 23 MG/DL    Creatinine 2.07 (H) 0.6 - 1.0 MG/DL    GFR est AA 29 (L) >60 ml/min/1.73m2    GFR est non-AA 24 (L) >60 ml/min/1.73m2    Calcium 9.4 8.3 - 10.4 MG/DL   GLUCOSE, POC    Collection Time: 06/28/21  6:19 AM   Result Value Ref Range    Glucose (POC) 145 (H) 65 - 100 mg/dL    Performed by [employer]        All Micro Results     Procedure Component Value Units Date/Time    CULTURE, URINE [[id number]] Collected: 06/24/21 2235    Order Status: Completed Specimen: Cath Urine Updated: 06/27/21 0656     Special Requests: NO SPECIAL REQUESTS        Culture result:       1000 COLONIES/mL NORMAL SKIN REDD ISOLATED                EKG Results     Procedure 720 Value Units Date/Time    EKG [[id number]] Collected: 06/23/21 2247    Order Status: Completed Updated: 06/24/21 0657     Ventricular Rate 65 BPM      Atrial Rate 63 BPM      QRS Duration 92 ms      Q-T Interval 440 ms      QTC Calculation (Bezet) 457 ms      Calculated R Axis 31 degrees      Calculated T Axis 95 degrees      Diagnosis --     !! AGE AND GENDER SPECIFIC ECG ANALYSIS !! Normal sinus rhythm  Anterior infarct (cited on or before 16-APR-2021)  Abnormal ECG  When compared with ECG of 30-MAY-2021 19:48,  Junctional rhythm has replaced Sinus rhythm  Confirmed by Vinny Jurado MD (), Sandy Mtz ([id number]) on 6/24/2021 6:57:27 AM            Other Studies:  No results found.     Current Meds:   Current Facility-Administered Medications   Medication Dose Route Frequency    [Held by provider] furosemide (LASIX) injection 20 mg  20 mg IntraVENous DAILY    0.45% sodium chloride infusion  50 mL/hr IntraVENous CONTINUOUS    hydrALAZINE (APRESOLINE) 20 mg/mL injection 10 mg  10 mg IntraVENous Q6H PRN    methyl salicylate-menthol (BENGAY) 15-10 % cream   Topical TID PRN    metoprolol succinate (TOPROL-XL) XL tablet 12.5 mg  12.5 mg Oral DAILY    lip protectant (BLISTEX) ointment 1 Each  1 Each Topical PRN    spironolactone (ALDACTONE) tablet 12.5 mg  12.5 mg Oral DAILY    methylPREDNISolone (PF) (SOLU-MEDROL) injection 40 mg  40 mg IntraVENous Q12H    insulin glargine (LANTUS) injection 12 Units  12 Units SubCUTAneous DAILY    nystatin (MYCOSTATIN) 100,000 unit/gram powder   Topical PRN    insulin lispro (HUMALOG) injection   SubCUTAneous AC&HS    sodium chloride (OCEAN) 0.65 % nasal squeeze bottle 2 Spray  2 Spray Both Nostrils Q2H PRN    sodium chloride (NS) flush 5-40 mL  5-40 mL IntraVENous PRN    acetaminophen (TYLENOL) tablet 650 mg  650 mg Oral Q6H PRN    Or    acetaminophen (TYLENOL) suppository 650 mg  650 mg Rectal Q6H PRN    polyethylene glycol (MIRALAX) packet 17 g  17 g Oral DAILY PRN    ondansetron (ZOFRAN ODT) tablet 4 mg  4 mg Oral Q8H PRN    Or    ondansetron (ZOFRAN) injection 4 mg  4 mg IntraVENous Q6H PRN    budesonide (PULMICORT) 500 mcg/2 ml nebulizer suspension  500 mcg Nebulization BID RT    albuterol-ipratropium (DUO-NEB) 2.5 MG-0.5 MG/3 ML  3 mL Nebulization Q4H RT    heparin (porcine) injection 5,000 Units  5,000 Units SubCUTAneous Q12H    alcohol 62% (NOZIN) nasal  1 Ampule  1 Ampule Topical Q12H    sodium chloride (NS) flush 5-10 mL  5-10 mL IntraVENous Q8H    sodium chloride (NS) flush 5-10 mL  5-10 mL IntraVENous PRN       Problem List:  Hospital Problems as of 6/28/2021 Date Reviewed: 4/19/2021        Codes Class Noted - Resolved POA    Stridor ICD-10-CM: R06.1  ICD-9-CM: 786.1  6/25/2021 - Present Yes        * (Principal) 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  ICD-9-CM: 478.30  6/25/2021 - Present Yes        Acute hypercapnic respiratory failure (HCC) ICD-10-CM: J96.02  ICD-9-CM: 518.81  6/24/2021 - Present Yes        Hyperkalemia ICD-10-CM: E87.5  ICD-9-CM: 276.7  5/5/2021 - Present Yes        Insomnia (Chronic) ICD-10-CM: G47.00  ICD-9-CM: 780.52  6/15/2015 - Present Yes                   Part of this note was written by using a voice dictation software and the note has been proof read but may still contain some grammatical/other typographical errors.     Signed By: Kristin Wu MD   [employer] [facility]    June 28, 2021  5:15 PM

## 2021-06-28 NOTE — PROGRESS NOTES
SPEECH LANGUAGE PATHOLOGY: MODIFIED BARIUM SWALLOW STUDY  Initial Assessment and Discharge    NAME/AGE/GENDER: Jeffry Ahn is a 78 y.o. female  DATE: 6/28/2021  PRIMARY DIAGNOSIS: Acute hypercapnic respiratory failure ([facility] [address] [J96.02]      ICD-10: Treatment Diagnosis: Oropharyngeal dysphagia (R13.12)  INTERDISCIPLINARY COLLABORATION: Radiologist, Prisca  PRECAUTIONS/ALLERGIES: Sulfa (sulfonamide antibiotics)     RECOMMENDATIONS/PLAN   DIET:   Regular Consistency  Thin Liquids    MEDICATIONS: With liquid     COMPENSATORY STRATEGIES/MODIFICATIONS INCLUDING:  None     OTHER RECOMMENDATIONS (including follow up treatment recommendations):   N/A     RECOMMENDATIONS for CONTINUED SPEECH THERAPY:   No acute speech therapy needs identified at this time. Voice evaluation and treatment for vocal cord impairment at next level of care       ASSESSMENT   Ms. Justin Diaz presents with swallow function that is within normal limits. Functional oral prep with all textures despite missing dentition. Timely swallow with appropriate hyolaryngeal elevation and excursion with adequate epiglottic inversion. No penetration or aspiration with thin liquids (totalling 6 ounces) via serial cup or straw sips. No pharyngeal residue after the swallow. Recommend regular diet/thin liquids. Medications as tolerated. No acute speech therapy needs identified. Follow up for voice evaluation and treatment at next level of care. SUBJECTIVE   History of Present Injury/Illness: Ms. Justin Diaz  has a past medical history of Acute cystitis without hematuria (1/30/2019), Acute pancreatitis (8/12/2019), CAD (coronary artery disease), Diabetic ketoacidosis ([address] (3/8/2020), DM2 (diabetes mellitus, type 2) ([facility] [address] Elevated liver function tests (8/8/2019), Encephalopathy (2/7/2021), Gout, Gram-negative bacteremia (8/9/2019), HLD (hyperlipidemia), HTN (hypertension), Peripheral neuropathy, and Right lower quadrant abdominal pain (8/8/2019). . She also  has a past surgical history that includes hx tubal ligation; hx coronary artery bypass graft; pr cardiac surg procedure unlist; hx cholecystectomy; and ir insert non tunl cvc over 5 yrs (1/11/2021). Pain:  Pain Intensity 1: 0  Pain Location 1: Leg  Pain Orientation 1: Left, Right  Pain Intervention(s) 1: Medication (see MAR), Position    Current dietary status prior to evaluation today:  NPO    Previous Modified Barium Swallow studies: 5/31/21- Recommendations for regular diet/thin liquids. Current Medications:   No current facility-administered medications on file prior to encounter. Current Outpatient Medications on File Prior to Encounter   Medication Sig Dispense Refill    albuterol-ipratropium (DUO-NEB) 2.5 mg-0.5 mg/3 ml nebu 3 mL by Nebulization route every four (4) hours as needed for Wheezing or Shortness of Breath. 15 Nebule 0    insulin lispro (HUMALOG) 100 unit/mL injection 0-10 Units by SubCUTAneous route Before breakfast, lunch, and dinner. For Blood Sugar (mg/dL) of:     Less than 150 =   0 units           150 -199 =   2 units  200 -249 =   4 units  250 -299 =   6 units  300 -349 =   8 units  350 and above = 10 units 1 Vial 0    furosemide (LASIX) 20 mg tablet Take 1 Tablet by mouth every other day. 15 Tablet 0    metoprolol succinate (TOPROL-XL) 25 mg XL tablet Take 0.5 Tabs by mouth daily. 30 Tab 0    ferrous sulfate 325 mg (65 mg iron) tablet Take  by mouth every other day.  Omeprazole delayed release (PRILOSEC D/R) 20 mg tablet Take 20 mg by mouth daily.  ascorbic acid, vitamin C, (Vitamin C) 500 mg tablet Take  by mouth every other day.  aspirin delayed-release 81 mg tablet Take  by mouth daily.  Insulin Needles, Disposable, 31 gauge x 5/16\" ndle by SubCUTAneous route Before breakfast, lunch, dinner and at bedtime. 1 Package 11    clopidogrel (PLAVIX) 75 mg tab Take 75 mg by mouth.       Blood-Glucose Meter (ONETOUCH ULTRAMINI) monitoring kit Use as directed 1 Kit 0    glucose blood VI test strips (ONETOUCH ULTRA TEST) strip Test 4 times daily as directed 100 Strip 11    Lancets (ONETOUCH ULTRASOFT LANCETS) misc Test 4 times daily as directed 100 Each 11    gabapentin (NEURONTIN) 100 mg capsule Take 1 Cap by mouth three (3) times daily. 30 Cap 5    rOPINIRole (REQUIP) 0.5 mg tablet Take 1 Tab by mouth nightly as needed. 90 Tab 1       OBJECTIVE   Orientation:   Person  Place  Time  Situation    Oral Assessment:  Labial: No impairment  Lingual: No impairment  Dentition: Limited  Oral Hygiene: Adequate  Vocal Quality: WFL    Modified barium swallow study was performed in the radiology suite using c-arm with Ms. Taylor in the lateral plane upright 90° in a stretcher. To evaluate her swallow function, barium coated liquid and food was administered in the form of thin liquids, pudding, mixed consistency and cracker. Oral phase of swallow:   no significant oral issues observed    Pharyngeal phase of swallow:   Swallows of thin liquids were timely. No laryngeal penetration or aspiration was observed. No aspiration was observed. No pharyngeal residue after swallow. Swallows of pudding were timely. No laryngeal penetration or aspiration was observed. No aspiration was observed. No pharyngeal residue after swallow. Swallows of mixed consistencies were timely. No laryngeal penetration or aspiration was observed. No aspiration was observed. No pharyngeal residue after swallow. Swallows of cracker were timely. No laryngeal penetration or aspiration was observed. No aspiration was observed. No pharyngeal residue after swallow. Pharyngeal characteristics:  functional pharyngeal swallow  Attempted strategies:   none  Effective strategies:   none  Aspiration/Penetration Scale: 1 (No penetration/aspiration.  Contrast does not enter the airway.)    Cervical esophageal phase of swallow:   adequate and timely clearance of all boluses through cervical esophagus  **Distal esophagus not assessed due to limitations of MBS study. Assessment/Reassessment only, no treatment provided today. Tool Used: Dysphagia Outcome and Severity Scale (ELKE)    Comments   Normal Diet With no strategies or extra time needed   Functional Swallow May have mild oral or pharyngeal delay   Mild Dysphagia Which may require one diet consistency restricted    Mild-Moderate Dysphagia With 1-2 diet consistencies restricted   Moderate Dysphagia With 2 or more diet consistencies restricted   Moderately Severe Dysphagia With partial PO strategies (trials with ST only)   Severe Dysphagia With inability to tolerate any PO safely      Score:  Initial: 7 Most Recent: x (Date:6/28/2021)   Interpretation of Tool: The Dysphagia Outcome and Severity Scale (ELKE) is a simple, easy-to-use, 7-point scale developed to systematically rate the functional severity of dysphagia based on objective assessment and make recommendations for diet level, independence level, and type of nutrition. Payor: [employer] / Plan: [employer] [address] / Product Type: Managed Care Medicare /     Education:  Recommendations discussed with patient at end of session. Safety:   After treatment position/precautions:  Patient waiting in radiology holding bay for transport back to room.     Total Treatment Duration:  Time In: 0930   Time Out: 1000    Ace Crane, [facility], [facility] PERLA KEARNEY CCC-SLP  Speech Language Pathologist  Acute Rehabilitation Services  Contact: Clara

## 2021-06-29 NOTE — PROGRESS NOTES
ACUTE PHYSICAL THERAPY GOALS:  (Developed with and agreed upon by patient and/or caregiver.)    1. Patient will perform bed mobility with SUPERVISION within 7 days. 2. Patient will transfer bed to chair with MINIMAL ASSISTANCE within 7 days. 3. Patient will demonstrate FAIR DYNAMIC STANDING balance within 7 day(s). 4. Patient will ambulate 10ft+ using least restrictive assistive device and MODERATE ASSISTANCE within 7 days. 5. Patient will tolerate 25+ minutes of therapeutic activity/exercise and/or neuromuscular re-education while maintaining stable vitals to improve functional strength and activity tolerance within 7 days. PHYSICAL THERAPY ASSESSMENT: Initial Assessment, Daily Note and AM PT Treatment Day # 1      Wanda Torres is a 78 y.o. female   PRIMARY DIAGNOSIS: Vocal cord paresis  Acute hypercapnic respiratory failure ([facility] [address] [J96.02]       Reason for Referral:    ICD-10: Treatment Diagnosis: Generalized Muscle Weakness (M62.81)  Difficulty in walking, Not elsewhere classified (R26.2)  INPATIENT: Payor: [facility] MEDICARE / Plan: [employer] / Product Type: [employer] Medicare /     ASSESSMENT:     REHAB RECOMMENDATIONS:   Recommendation to date pending progress:  Setting:   Short-term Rehab  Equipment:    To Be Determined     PRIOR LEVEL OF FUNCTION:  (Prior to Hospitalization) INITIAL/CURRENT LEVEL OF FUNCTION:  (Most Recently Demonstrated)   Bed Mobility:   Standby Assistance  Sit to Stand:   Moderate Assistance  Transfers:   Maximal Assistance  Gait/Mobility:   Maximal Assistance Bed Mobility:   Contact Guard Assistance  Sit to Stand:   Moderate Assistance x 2  Transfers:   Moderate Assistance x 2  Gait/Mobility:   Moderate Assistance x 2     ASSESSMENT:  Ms. Radha Dewitt is a pleasant 78year old female admitted with acute respiratory failure and vocal cord paralysis. Patient is seen this AM for initial physical therapy evaluation.  Patient history significant for prolonged hospitalization and intubation with COVID 19 and has been at a SNF recovering. PTA, patient was non ambulatory and requiring assistance with transfers. Today, presents with generalized weakness, decreased functional activity tolerance, decreased muscle tone, and impaired functional mobility. (See detailed assessment below.) Recommend STR at discharge to optimize functional mobility. Will follow with stated plan of care during acute stay. SUBJECTIVE:   Ms. Sita Christiansen states, \"What's your name? \"    SOCIAL HISTORY/LIVING ENVIRONMENT: Admitted from SNF. Patient history of prolonged hospitalization   and intubation with COVID 19 and has been at a SNF recovering. PTA, patient was non ambulatory   and requiring assistance with transfers. Able to participate in upper body ADLs and required assistance  With lower body ADLs.    Home Environment: Skilled nursing facility  One/Two Story Residence: One story  Living Alone: No  Support Systems: Skilled nursing facility, Child(marga)  OBJECTIVE:     PAIN: VITAL SIGNS: LINES/DRAINS:   Pre Treatment: Pain Screen  Pain Scale 1: Numeric (0 - 10)  Pain Intensity 1: 0  Post Treatment: 0/10  C/o chronic R LE pain   Clifton Catheter and IV  O2 Device: None (Room air)     GROSS EVALUATION:   Within Functional Limits Abnormal/ Functional Abnormal/ Non-Functional (see comments) Not Tested Comments:   AROM [] [x] [] [] Hamstring extension; shoulder flexion bilaterally   PROM [] [] [] [x]    Strength [] [x] [] [] Generalized weakness B UE/LE and trunk; low tone B LEs   Balance [] [x] [] [] Fair dynamically in sitting; poor dynamically in standing   Posture [] [x] [] [] Forward head; rounded shoulders, mild to moderate trunk flexion in standing   Sensation [x] [] [] [] Intact light touch distal B LEs   Coordination [] [] [] [x]    Tone [] [x] [] [] Low tone B LEs   Edema [] [] [] [x]    Activity Tolerance [] [x] [] [] Acute on chronically impaired    [] [] [] [] COGNITION/  PERCEPTION: Intact Impaired   (see comments) Comments:   Orientation [x] [] Oriented x4   Vision [] [] Not formally assessed   Hearing [] []    Command Following [x] []    Safety Awareness [] [x] Required cues for activity pacing     [] []      MOBILITY: I Mod I S SBA CGA Min Mod Max Total  NT x2 Comments:   Bed Mobility    Rolling [] [] [] [] [] [] [] [] [] [x] []    Supine to Sit [] [] [] [] [x] [] [] [] [] [] [] Additional time   Scooting [] [] [] [] [x] [] [] [] [] [] [] Additional time   Sit to Supine [] [] [] [] [] [] [] [] [] [] []    Transfers    Sit to Stand [] [] [] [] [] [] [x] [] [] [] [x]    Bed to Chair [] [] [] [] [] [] [x] [] [] [] [x]    Stand to Sit [] [] [] [] [] [] [x] [] [] [] [x]    I=Independent, Mod I=Modified Independent, S=Supervision, SBA=Standby Assistance, CGA=Contact Guard Assistance,   Min=Minimal Assistance, Mod=Moderate Assistance, Max=Maximal Assistance, Total=Total Assistance, NT=Not Tested  GAIT: I Mod I S SBA CGA Min Mod Max Total  NT x2 Comments:   Level of Assistance [] [] [] [] [] [] [x] [] [] [] [x] Will utilize a RW next treatment session    Distance 3 steps to chair    DME Gait Belt and B hand hold    Gait Quality Decreased step length; decreased step clearance; fair; impaired dynamic balance     Weightbearing Status N/A     I=Independent, Mod I=Modified Independent, S=Supervision, SBA=Standby Assistance, CGA=Contact Guard Assistance,   Min=Minimal Assistance, Mod=Moderate Assistance, Max=Maximal Assistance, Total=Total Assistance, NT=Not Tested    [employer] AM-PAC 6 Clicks   Basic Mobility Inpatient Short Form       How much difficulty does the patient currently have. .. Unable A Lot A Little None   1. Turning over in bed (including adjusting bedclothes, sheets and blankets)? [] 1   [] 2   [x] 3   [] 4   2. Sitting down on and standing up from a chair with arms ( e.g., wheelchair, bedside commode, etc.)   [] 1   [x] 2   [] 3   [] 4   3. Moving from lying on back to sitting on the side of the bed? [] 1   [] 2   [x] 3   [] 4   How much help from another person does the patient currently need. .. Total A Lot A Little None   4. Moving to and from a bed to a chair (including a wheelchair)? [] 1   [x] 2   [] 3   [] 4   5. Need to walk in hospital room? [] 1   [x] 2   [] 3   [] 4   6. Climbing 3-5 steps with a railing? [x] 1   [] 2   [] 3   [] 4   © 2007, Trustees of [facility] [employer], under license to [employer]. All rights reserved     Score:  Initial: 13 Most Recent: X (Date: -- )    Interpretation of Tool:  Represents activities that are increasingly more difficult (i.e. Bed mobility, Transfers, Gait). PLAN:   FREQUENCY/DURATION: PT Plan of Care: 3 times/week for duration of hospital stay or until stated goals are met, whichever comes first.    PROBLEM LIST:   (Skilled intervention is medically necessary to address:)  1. Decreased ADL/Functional Activities  2. Decreased Activity Tolerance  3. Decreased AROM/PROM  4. Decreased Balance  5. Decreased Gait Ability  6. Decreased Strength  7. Decreased Transfer Abilities   INTERVENTIONS PLANNED:   (Benefits and precautions of physical therapy have been discussed with the patient.)  1. Therapeutic Activity  2. Therapeutic Exercise/HEP  3. Neuromuscular Re-education  4. Gait Training  5. Manual Therapy  6. Education     TREATMENT:     EVALUATION: Low Complexity : (Untimed Charge)    TREATMENT:   ($$ Neuromuscular Re-education: 8-22 mins    )  Neuromuscular Re-education (10 Minutes): Neuromuscular Re-education included Balance Training, Functional mobility with facilitation, Postural training, Sitting balance training and Standing balance training to improve Balance and Functional Mobility.     TREATMENT GRID:  N/A    AFTER TREATMENT POSITION/PRECAUTIONS:  Chair, Needs within reach, RN notified and OT attending for further treatment    INTERDISCIPLINARY COLLABORATION:  RN/PCT, PT/PTA and OT/PERALTA    TOTAL TREATMENT DURATION:  PT Patient Time In/Time Out  Time In: 0930  Time Out: [address] [facility],[address], [facility]

## 2021-06-29 NOTE — PROGRESS NOTES
Unique Hospitalist Progress Note     Name:  Shalom Luna  Age:81 y.o. Sex:female   :  1940    MRN:  [id number]     Admit Date:  2021    Reason for Admission:  Acute hypercapnic respiratory failure ([facility] [address] [facility] Course/Interval history:     Patient with past medical history of    Acute cystitis without hematuria (2019),   Acute pancreatitis (2019),   CAD (coronary artery disease),   Diabetic ketoacidosis ([facility] [address] (3/8/2020),   DM2 (diabetes mellitus, type 2) ([facility] [address]   Elevated liver function tests (2019),   Encephalopathy (2021),    Gout,   Gram-negative bacteremia (2019),   HLD (hyperlipidemia),   HTN (hypertension),   Peripheral neuropathy,   Right lower quadrant abdominal pain (2019). ,      Patient has a past surgical history that includes hx tubal ligation; hx coronary artery bypass graft; pr cardiac surg procedure unlist; hx cholecystectomy; and interventional radiology insert non tunl cvc over 5 yrs (2021). Patient had COVID 19 infection, had ARDS and prolonged ICU course s/p tracheostomy in 2021. Patient was discharged to [facility] and had slow recovery and tracheostomy was decanullated. Patient is wheel-chair bound however. She presented to hospital this time with stridor. She was admitted to ICU and on BiPAP. ENT saw patient and found patient to have vocal cord paralysis. She is being treated with steroid. She was on Solumedrol. She is on Prednisone now. Subjective (21):    21   Patient is feeling the same compared to yesterday. Complaining of right knee pain. 21   Patient reports no troubles with breathing. No fever. No shaking. No chills. No shortness of breath. Patient is to go for barium swallow study today. 21   Patient has no shortness of breath. Able to have oral intake OK. Last night, patient could not urinate and had bladder scan showing urine volume of 300 mL.  Clifton's catheter was placed back in. Review of Systems: 14 point review of systems is otherwise negative with the exception of the elements mentioned above. Assessment & Plan     Vocal cord paralysis   With stridor   On Prednisone 40 mg po q day. Plan for 5 days of this. Patient and family would like to avoid intubation and tracheostomy. Normal swallowing study. Patient tolerates PO well. Pulmonologist is following. Will follow advice. Chronic respiratory failure with hypercapnia   Patient has history of TANISHA. Plan for CPAP arrangement at home. Dysphagia   No problem now. Eating well. Diastolic heart failure   Patient was on Furosemide 20 mg po every other day before admission. Creatinine started to go up. Lasix in on hold. On Aldactone. Monitor closely. Hypertension  Monitor blood pressure and manage accordingly. On Metoprolol. Diabetes mellitus type 2  Monitor blood sugar. Cover with insulin sliding scale accordingly. Blood sugar is in mid 100's ranges to 200's and acceptable.      Diet:  DIET ADULT  DVT PPx: heparin SC   Code status: Full Code  Disposition/Expected LOS: 2-3 days               Objective:     Patient Vitals for the past 24 hrs:   Temp Pulse Resp BP SpO2   06/29/21 0735 97.7 °F (36.5 °C) 64 18 (!) 167/80 98 %   06/29/21 0723     99 %   06/29/21 0411 97.6 °F (36.4 °C) 64 18 136/72 100 %   06/29/21 0400  67      06/29/21 0340     100 %   06/28/21 2355  (!) 53      06/28/21 2353 97.9 °F (36.6 °C) (!) 57 17 (!) 146/71 100 %   06/28/21 2313     92 %   06/28/21 2254     94 %   06/28/21 1948 97.9 °F (36.6 °C) 67 18 (!) 168/80 91 %   06/28/21 1929     92 %   06/28/21 1645 97.3 °F (36.3 °C) 75 19 (!) 164/87 90 %   06/28/21 1148     92 %     Oxygen Therapy  O2 Sat (%): 98 % (06/29/21 0735)  Pulse via Oximetry: 86 beats per minute (06/29/21 0723)  O2 Device: None (Room air) (06/29/21 0735)  Skin Assessment: Clean, dry, & intact (06/28/21 1929)  O2 Flow Rate (L/min): 1 l/min (06/27/21 2307)  FIO2 (%): 30 % (06/28/21 2313)    Body mass index is 25.04 kg/m². Physical Exam:   General:  No acute distress, no use of accessory muscles   More normal voice and no stridor with inspiration.     Lungs:  Decreased to auscultation bilaterally with some crackles and transmitted sound   CV:  Regular rate and rhythm with normal S1 and S2   Abdomen:  Soft, nontender, nondistended, normoactive bowel sounds   Extremities:  No cyanosis clubbing or edema   Neuro:  Nonfocal, A&O x3   Psych:  Normal affect     Data Review:  I have reviewed all labs, meds, and studies from the last 24 hours:    Labs:    Recent Results (from the past 24 hour(s))   GLUCOSE, POC    Collection Time: 06/28/21  4:42 PM   Result Value Ref Range    Glucose (POC) 170 (H) 65 - 100 mg/dL    Performed by Lai Corral, POC    Collection Time: 06/28/21  9:23 PM   Result Value Ref Range    Glucose (POC) 217 (H) 65 - 100 mg/dL    Performed by Jesus)    GLUCOSE, POC    Collection Time: 06/29/21  6:18 AM   Result Value Ref Range    Glucose (POC) 50 (L) 65 - 100 mg/dL    Performed by Francisca    GLUCOSE, POC    Collection Time: 06/29/21  6:51 AM   Result Value Ref Range    Glucose (POC) 104 (H) 65 - 100 mg/dL    Performed by Jesus)    GLUCOSE, POC    Collection Time: 06/29/21 11:04 AM   Result Value Ref Range    Glucose (POC) 239 (H) 65 - 100 mg/dL    Performed by Jason        All Micro Results     Procedure Component Value Units Date/Time    CULTURE, URINE [[id number]] Collected: 06/24/21 2235    Order Status: Completed Specimen: Cath Urine Updated: 06/27/21 0656     Special Requests: NO SPECIAL REQUESTS        Culture result:       1000 COLONIES/mL NORMAL SKIN REDD ISOLATED                EKG Results     Procedure 720 Value Units Date/Time    EKG [[id number]] Collected: 06/23/21 2247    Order Status: Completed Updated: 06/24/21 0657     Ventricular Rate 65 BPM Atrial Rate 63 BPM      QRS Duration 92 ms      Q-T Interval 440 ms      QTC Calculation (Bezet) 457 ms      Calculated R Axis 31 degrees      Calculated T Axis 95 degrees      Diagnosis --     !! AGE AND GENDER SPECIFIC ECG ANALYSIS !! Normal sinus rhythm  Anterior infarct (cited on or before 16-APR-2021)  Abnormal ECG  When compared with ECG of 30-MAY-2021 19:48,  Junctional rhythm has replaced Sinus rhythm  Confirmed by Lawerance Canavan MD (), [place] ([id number]) on 6/24/2021 6:57:27 AM            Other Studies:  No results found.     Current Meds:   Current Facility-Administered Medications   Medication Dose Route Frequency    [START ON 6/30/2021] predniSONE (DELTASONE) tablet 40 mg  40 mg Oral DAILY WITH BREAKFAST    [Held by provider] furosemide (LASIX) injection 20 mg  20 mg IntraVENous DAILY    oxyCODONE IR (ROXICODONE) tablet 5 mg  5 mg Oral Q6H PRN    gabapentin (NEURONTIN) capsule 100 mg  100 mg Oral TID    hydrALAZINE (APRESOLINE) 20 mg/mL injection 10 mg  10 mg IntraVENous Q6H PRN    methyl salicylate-menthol (BENGAY) 15-10 % cream   Topical TID PRN    metoprolol succinate (TOPROL-XL) XL tablet 12.5 mg  12.5 mg Oral DAILY    lip protectant (BLISTEX) ointment 1 Each  1 Each Topical PRN    spironolactone (ALDACTONE) tablet 12.5 mg  12.5 mg Oral DAILY    insulin glargine (LANTUS) injection 12 Units  12 Units SubCUTAneous DAILY    nystatin (MYCOSTATIN) 100,000 unit/gram powder   Topical PRN    insulin lispro (HUMALOG) injection   SubCUTAneous AC&HS    sodium chloride (OCEAN) 0.65 % nasal squeeze bottle 2 Spray  2 Spray Both Nostrils Q2H PRN    sodium chloride (NS) flush 5-40 mL  5-40 mL IntraVENous PRN    acetaminophen (TYLENOL) tablet 650 mg  650 mg Oral Q6H PRN    Or    acetaminophen (TYLENOL) suppository 650 mg  650 mg Rectal Q6H PRN    polyethylene glycol (MIRALAX) packet 17 g  17 g Oral DAILY PRN    ondansetron (ZOFRAN ODT) tablet 4 mg  4 mg Oral Q8H PRN    Or    ondansetron (ZOFRAN) injection 4 mg  4 mg IntraVENous Q6H PRN    budesonide (PULMICORT) 500 mcg/2 ml nebulizer suspension  500 mcg Nebulization BID RT    albuterol-ipratropium (DUO-NEB) 2.5 MG-0.5 MG/3 ML  3 mL Nebulization Q4H RT    heparin (porcine) injection 5,000 Units  5,000 Units SubCUTAneous Q12H    alcohol 62% (NOZIN) nasal  1 Ampule  1 Ampule Topical Q12H    sodium chloride (NS) flush 5-10 mL  5-10 mL IntraVENous Q8H    sodium chloride (NS) flush 5-10 mL  5-10 mL IntraVENous PRN       Problem List:  Hospital Problems as of 6/29/2021 Date Reviewed: 6/28/2021        Codes Class Noted - Resolved POA    TANISHA (obstructive sleep apnea) (Chronic) ICD-10-CM: G47.33  ICD-9-CM: 327.23  6/28/2021 - Present Yes        Stridor ICD-10-CM: R06.1  ICD-9-CM: 786.1  6/25/2021 - Present Yes        * (Principal) 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  ICD-9-CM: 478.30  6/25/2021 - Present Yes        Acute hypercapnic respiratory failure (HCC) ICD-10-CM: J96.02  ICD-9-CM: 518.81  6/24/2021 - Present Yes        Insomnia (Chronic) ICD-10-CM: G47.00  ICD-9-CM: 780.52  6/15/2015 - Present Yes        RESOLVED: Hyperkalemia ICD-10-CM: E87.5  ICD-9-CM: 276.7  5/5/2021 - 6/28/2021 Yes                   Part of this note was written by using a voice dictation software and the note has been proof read but may still contain some grammatical/other typographical errors.     Signed By: Josemanuel Simmons MD   [employer] [facility]    June 29, 2021  5:15 PM

## 2021-06-29 NOTE — PROGRESS NOTES
RN reports urinary retention since coyle removed 12 hours ago - bladder scan >300 cc.  Order to place replace coyle

## 2021-06-29 NOTE — PROGRESS NOTES
Date of Outreach Update:  Hilario Rivera was seen and assessed. MEWS Score: 1 (06/29/21 0735)  Vitals:    06/29/21 0550 06/29/21 0723 06/29/21 0735 06/29/21 1226   BP:   (!) 167/80 135/71   Pulse:   64 68   Resp:   18 18   Temp:   97.7 °F (36.5 °C) 97.2 °F (36.2 °C)   SpO2:  99% 98% 94%   Weight: 68.3 kg (150 lb 8 oz)      Height:             Pain Assessment  Pain Intensity 1: 0 (06/29/21 0930)  Pain Location 1: Leg  Pain Intervention(s) 1: Medication (see MAR), Repositioned  Patient Stated Pain Goal: 0      Previous Outreach assessment has been reviewed. There have been no significant clinical changes since the completion of the last dated Outreach assessment. Patient resting in bed without complaint. Reports pain is under control. R chest tube has been removed, site is dressed without any issues. HR 89, remains on 8L HFNC, O2sat 96%, will wean as able. Will continue to follow up per outreach protocol.     Signed By:   Nicolas Samuel RN    June 29, 2021 12:42 PM

## 2021-06-29 NOTE — PROGRESS NOTES
Pt is medically cleared for dc today and will return to SNF. Facility is initiating the STR auth request but pt can return before a decision is rendered since she is already a LTC resident at the facility. Transport scheduled for 1600 through [facility]. RN to call report to #[phone]. SW spoke with pt's dtr, Hipolito Cabrera, to alert her to the pt's dc and transfer back to [facility] today. SW remains available to assist as needed. Care Management Interventions  PCP Verified by CM:  Yes  Mode of Transport at Discharge: BLS ([facility] Transport Time of Discharge: 1600  Transition of Care Consult (CM Consult): SNF  Partner SNF: No  Reason Why Partner SNF Not Chosen:  (pt is LTC at her current facility)  Discharge Durable Medical Equipment: No  Physical Therapy Consult: Yes  Occupational Therapy Consult: Yes  Speech Therapy Consult: Yes  Current Support Network: Family [facility]  Confirm Follow Up Transport: Other (see comment) (facility transport)  The Plan for Transition of Care is Related to the Following Treatment Goals : Possible STR with transition to LTC at [facility]  The Patient and/or Patient Representative was Provided with a Choice of Provider and Agrees with the Discharge Plan?: Yes  Freedom of Choice List was Provided with Basic Dialogue that Supports the Patient's Individualized Plan of Care/Goals, Treatment Preferences and Shares the Quality Data Associated with the Providers?: Yes  [place] Resource Information Provided?:  [facility])  Discharge Location  Discharge Placement: Rehab Unit Subacute ([facility])

## 2021-06-29 NOTE — PROGRESS NOTES
Bedside, Verbal and Written shift change report given to NANCY RN  (oncoming nurse) by Dione Mcelroy RN  (offgoing nurse). Report included the following information SBAR, Kardex, ED Summary, Intake/Output, MAR, Recent Results, Med Rec Status, Cardiac Rhythm TELE , Alarm Parameters  and Quality Measures     LABS REVIEWED   NOTES REVIEWED      Pt on RA / clear diminished Bilat .      Denies pain     Pt repositioned    BG up from 50 this morning

## 2021-06-29 NOTE — PROGRESS NOTES
TRANSFER - OUT REPORT:    Verbal report given to [address] RN (name) on Matilde Reagan  being transferred to [facility] [address](unit) for routine progression of care       Report consisted of patients Situation, Background, Assessment and   Recommendations(SBAR). Information from the following report(s) SBAR, Kardex, ED Summary, Procedure Summary, Intake/Output, MAR, Recent Results, Med Rec Status, Cardiac Rhythm NSR, Alarm Parameters  and Quality Measures was reviewed with the receiving nurse. Lines:   Peripheral IV 06/27/21 Anterior; Left Forearm (Active)   Site Assessment Clean, dry, & intact 06/29/21 0735   Phlebitis Assessment 0 06/29/21 0735   Infiltration Assessment 0 06/29/21 0735   Dressing Status Clean, dry, & intact 06/29/21 0735   Dressing Type Transparent 06/29/21 0735   Hub Color/Line Status Blue;Flushed;Patent 06/29/21 0735   Alcohol Cap Used No 06/29/21 0735        Opportunity for questions and clarification was provided.       Patient transported with:   Registered Nurse

## 2021-06-29 NOTE — PROGRESS NOTES
ACUTE OT GOALS:  (Developed with and agreed upon by patient and/or caregiver.)  1. Patient will complete upper body bathing and dressing with setup and adaptive equipment as needed. 2. Patient will complete toileting with supervision. 3. Patient will tolerate 20 minutes of OT treatment with as needed rest breaks to increase activity tolerance for ADLs. 4. Patient will complete functional transfers with minimal assistance  and adaptive equipment as needed. 5. Patient will complete grooming with setup. Timeframe: 7 visits       OCCUPATIONAL THERAPY ASSESSMENT: Initial Assessment and Daily Note OT Treatment Day # 1    Chandrika Dyson is a 78 y.o. female   PRIMARY DIAGNOSIS: Vocal cord paresis  Acute hypercapnic respiratory failure ([facility] [address] [J96.02]       Reason for Referral:  Pneumonia   ICD-10: Treatment Diagnosis: Generalized Muscle Weakness (M62.81)  Other lack of cordination (R27.8)  INPATIENT: Payor: [facility] MEDICARE / Plan: [employer] [address] / Product Type: [employer] /   ASSESSMENT:     REHAB RECOMMENDATIONS:   Recommendation to date pending progress:  Setting:   Short-term Rehab  Equipment:    To Be Determined     PRIOR LEVEL OF FUNCTION:  (Prior to Hospitalization)  INITIAL/CURRENT LEVEL OF FUNCTION:  (Based on today's evaluation)   Bathing:   Moderate Assistance  Dressing:   Moderate Assistance  Feeding/Grooming:   Set Up  Toileting:   Minimal Assistance  Functional Mobility:   Maximal Assistance Bathing:   Maximal Assistance  Dressing:   Maximal Assistance  Feeding/Grooming:   Minimal Assistance  Toileting:   Maximal Assistance  Functional Mobility:   Maximal Assistance     ASSESSMENT:  Ms. Tony Avendaño is an 78year old admitted for acute hypercapnic respiratory failure and vocal cord paralysis. She has relevant history of prolonged [id number]-59 with complications requiring ventilation, trach.  Since then she has lived in [address] [place] [address] and required assistance with ADLs. Unable to walk and requires assistance x1-2 for transfers to w/c at baseline. Today, able to transfer bed to chair with moderate assistance x2, then complete ADLs from seated position. She would benefit from continued OT to increase independence and safety. SUBJECTIVE:   Ms. Shirin Macias states, Brain Radha put my clothes in 718. \"    SOCIAL HISTORY/LIVING ENVIRONMENT: Lives in [address] [place] [address]. Had COVID in December 2020, was ventilated, then trached, then sent to [facility], then to LTC facility. Non-ambulatory and requires assistance x1-2 staff members to transfer to w/c. Needs assistance with ADLs at baseline. Can feed self.    Home Environment: Skilled nursing facility  One/Two Story Residence: One story  Living Alone: No  Support Systems: Ricarda Box(marga)    OBJECTIVE:     PAIN: VITAL SIGNS: LINES/DRAINS:   Pre Treatment: Pain Screen  Pain Scale 1: Numeric (0 - 10)  Pain Intensity 1: 0  Post Treatment: 0   Clifton Catheter and IV  O2 Device: None (Room air)     GROSS EVALUATION:  BUE Within Functional Limits Abnormal/ Functional Abnormal/ Non-Functional (see comments) Not Tested Comments:   AROM [] [x] [] []    PROM [] [] [] [x]    Strength [] [x] [] []    Balance [] [] [x] []    Posture [] [] [] [x]    Sensation [] [] [] [x]    Coordination [] [] [] [x]    Tone [] [] [] [x]    Edema [] [] [] [x]    Activity Tolerance [] [] [x] []     [] [] [] []      COGNITION/  PERCEPTION: Intact Impaired   (see comments) Comments:   Orientation [x] []    Vision [] []    Hearing [] []    Judgment/ Insight [] []    Attention [] []    Memory [] [x]    Command Following [] []    Emotional Regulation [] []     [] []      ACTIVITIES OF DAILY LIVING: I Mod I S SBA CGA Min Mod Max Total NT Comments   BASIC ADLs:              Bathing/ Showering [] [] [x] [] [] [] [] [x] [] [] S- upper body   Max- lower body    Toileting [] [] [] [] [] [] [] [] [] []    Dressing [] [] [] [] [] [x] [] [] [] []    Feeding [] [] [] [] [] [] [] [] [] []    Grooming [] [] [x] [] [] [] [] [] [] [] Washing face    Personal Device Care [] [] [] [] [] [] [] [] [] [x]    Functional Mobility [] [] [] [] [] [] [] [x] [] []    I=Independent, Mod I=Modified Independent, S=Supervision, SBA=Standby Assistance, CGA=Contact Guard Assistance,   Min=Minimal Assistance, Mod=Moderate Assistance, Max=Maximal Assistance, Total=Total Assistance, NT=Not Tested    MOBILITY: I Mod I S SBA CGA Min Mod Max Total  NT x2 Comments:   Supine to sit [] [] [] [] [] [x] [] [] [] [] []    Sit to supine [] [] [] [] [] [] [] [] [] [x] []    Sit to stand [] [] [] [] [] [] [] [x] [] [] [x]    Bed to chair [] [] [] [] [] [] [] [x] [] [] [x]    I=Independent, Mod I=Modified Independent, S=Supervision, SBA=Standby Assistance, CGA=Contact Guard Assistance,   Min=Minimal Assistance, Mod=Moderate Assistance, Max=Maximal Assistance, Total=Total Assistance, NT=Not Tested    [address] [place] [address] [address] [id number] AM-PAC 6 Clicks   Daily Activity Inpatient Short Form        How much help from another person does the patient currently need. .. Total A Lot A Little None   1. Putting on and taking off regular lower body clothing? [] 1   [x] 2   [] 3   [] 4   2. Bathing (including washing, rinsing, drying)? [] 1   [x] 2   [] 3   [] 4   3. Toileting, which includes using toilet, bedpan or urinal?   [] 1   [x] 2   [] 3   [] 4   4. Putting on and taking off regular upper body clothing? [] 1   [] 2   [x] 3   [] 4   5. Taking care of personal grooming such as brushing teeth? [] 1   [] 2   [x] 3   [] 4   6. Eating meals? [] 1   [] 2   [x] 3   [] 4   © 2007, Trustees of [address] [place] [address] [address] [id number], under license to [employer]. All rights reserved     Score:  Initial: 15 Most Recent: X (Date: -- )   Interpretation of Tool:  Represents activities that are increasingly more difficult (i.e. Bed mobility, Transfers, Gait).     PLAN:   FREQUENCY/DURATION: OT Plan of Care: 3 times/week for duration of hospital stay or until stated goals are met, whichever comes first.    PROBLEM LIST:   (Skilled intervention is medically necessary to address:)  1. Decreased ADL/Functional Activities  2. Decreased Activity Tolerance  3. Decreased Balance  4. Decreased Coordination  5. Decreased Gait Ability  6. Decreased Strength  7. Decreased Transfer Abilities   INTERVENTIONS PLANNED:   (Benefits and precautions of occupational therapy have been discussed with the patient.)  1. Self Care Training  2. Therapeutic Activity  3. Therapeutic Exercise/HEP  4. Neuromuscular Re-education  5. Education     TREATMENT:     EVALUATION: High Complexity : (Untimed Charge)    TREATMENT:   ($$ Self Care/Home Management: 8-22 mins    )  Co-Treatment PT/OT necessary due to patient's decreased overall endurance/tolerance levels, as well as need for high level skilled assistance to complete functional transfers/mobility and functional tasks  Self Care (8 Minutes): Self care including Upper Body Bathing, Lower Body Bathing, Upper Body Dressing and Grooming to increase independence and decrease level of assistance required.     TREATMENT GRID:  N/A    AFTER TREATMENT POSITION/PRECAUTIONS:  Alarm Activated, Chair, Needs within reach and RN notified    INTERDISCIPLINARY COLLABORATION:  MD/PA/NP, RN/PCT, PT/PTA, OT/PERALTA and RN Case Manager/     TOTAL TREATMENT DURATION:  OT Patient Time In/Time Out  Time In: 0926  Time Out: Lizzie Elizabeth OTR/L

## 2021-06-29 NOTE — DISCHARGE SUMMARY
[facility]     Name:  Michaela Madrid    Age:81 y.o. Sex:female  :  1940       MRN:  [id number]       Admitting Physician: Cl Lee MD Admit Date: 2021 10:32 PM   Attending Physician: Frederick Chris MD  Primary Care Physician: Edwin Baltazar MD       Discharge Physician: Vicky Moctezuma MD  Discharge date: 21   Discharged Condition: Stable    Indication for Admission:   Chief Complaint   Patient presents with    Shortness of Breath        Reasons for hospitalization:  Hospital Problems as of 2021 Date Reviewed: 2021        Codes Class Noted - Resolved POA    TANISHA (obstructive sleep apnea) (Chronic) ICD-10-CM: G47.33  ICD-9-CM: 327.23  2021 - Present Yes        Stridor ICD-10-CM: R06.1  ICD-9-CM: 786.1  2021 - Present Yes        * (Principal) 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  ICD-9-CM: 478.30  2021 - Present Yes        Acute hypercapnic respiratory failure ([facility] [address] ICD-10-CM: J96.02  ICD-9-CM: 518.81  2021 - Present Yes        Insomnia (Chronic) ICD-10-CM: G47.00  ICD-9-CM: 780.52  6/15/2015 - Present Yes        RESOLVED: Hyperkalemia ICD-10-CM: E87.5  ICD-9-CM: 276.7  2021 - 2021 Yes                 Discharge Diagnosis: stridor from vocal cord paralysis   Did Patient have Sepsis (YES OR NO): no       Hospital Course/Interval history:    Patient with past medical history of    Acute cystitis without hematuria (2019),   Acute pancreatitis (2019),   CAD (coronary artery disease),   Diabetic ketoacidosis ([facility] [address] (3/8/2020),   DM2 (diabetes mellitus, type 2) ([facility] [address]   Elevated liver function tests (2019),   Encephalopathy (2021),    Gout,   Gram-negative bacteremia (2019),   HLD (hyperlipidemia),   HTN (hypertension),   Peripheral neuropathy,   Right lower quadrant abdominal pain (2019). ,       Patient has a past surgical history that includes hx tubal ligation; hx coronary artery bypass graft; pr cardiac surg procedure unlist; hx cholecystectomy; and interventional radiology insert non tunl cvc over 5 yrs (1/11/2021).       Patient had COVID 19 infection, had ARDS and prolonged ICU course s/p tracheostomy in January 2021. Patient was discharged to [facility] and had slow recovery and tracheostomy was decanullated. Patient is wheel-chair bound however.      She presented to hospital this time with stridor. She was admitted to ICU and on BiPAP. ENT saw patient and found patient to have vocal cord paralysis. She is being treated with steroid. She was on Solumedrol. She is on Prednisone now. She is able to swallow well and has normal swallowing x-ray. She is being discharged in stable condition and will follow up with Dr. Ramiro Suarez, ENT and pulmonology. Her blood sugar during hospital stay was controlled by Lantus and insulin coverage. Her blood sugar level is expected to improve after discharge due to decreased doses of steroid and then it will be stopped after 5 days. Assessment/Plan:  Continue Prednisone for 5 days. .  Follow up with ENT and pulmonology. Consults:   IP CONSULT TO OTOLARYNGOLOGY  IP CONSULT TO HOSPITALIST     Disposition: Skilled Nursing Facility  Diet:   DIET ADULT  Code Status: Full Code      Follow up labs/imaging: -   Follow up with primary doctor in 3-5 days. Follow up with pulmonology in 2-3 weeks. Follow up with ENT in 1-2 weeks. Pending labs/studies: continue with CPAP at home for sleep apnea. Current Discharge Medication List      START taking these medications    Details   predniSONE (DELTASONE) 20 mg tablet Take 40 mg by mouth daily (with breakfast) for 5 days. Qty: 10 Tablet, Refills: 0  Start date: 6/30/2021, End date: 7/5/2021      spironolactone (ALDACTONE) 25 mg tablet Take 0.5 Tablets by mouth daily for 15 days.   Qty: 8 Tablet, Refills: 0  Start date: 6/30/2021, End date: 7/15/2021         CONTINUE these medications which have NOT CHANGED    Details   albuterol-ipratropium (DUO-NEB) 2.5 mg-0.5 mg/3 ml nebu 3 mL by Nebulization route every four (4) hours as needed for Wheezing or Shortness of Breath. Qty: 15 Nebule, Refills: 0      insulin lispro (HUMALOG) 100 unit/mL injection 0-10 Units by SubCUTAneous route Before breakfast, lunch, and dinner. For Blood Sugar (mg/dL) of:     Less than 150 =   0 units           150 -199 =   2 units  200 -249 =   4 units  250 -299 =   6 units  300 -349 =   8 units  350 and above = 10 units  Qty: 1 Vial, Refills: 0      furosemide (LASIX) 20 mg tablet Take 1 Tablet by mouth every other day. Qty: 15 Tablet, Refills: 0      metoprolol succinate (TOPROL-XL) 25 mg XL tablet Take 0.5 Tabs by mouth daily. Qty: 30 Tab, Refills: 0      ferrous sulfate 325 mg (65 mg iron) tablet Take  by mouth every other day. Omeprazole delayed release (PRILOSEC D/R) 20 mg tablet Take 20 mg by mouth daily. ascorbic acid, vitamin C, (Vitamin C) 500 mg tablet Take  by mouth every other day. aspirin delayed-release 81 mg tablet Take  by mouth daily. Insulin Needles, Disposable, 31 gauge x 5/16\" ndle by SubCUTAneous route Before breakfast, lunch, dinner and at bedtime. Qty: 1 Package, Refills: 11      clopidogrel (PLAVIX) 75 mg tab Take 75 mg by mouth. Blood-Glucose Meter (ONETOUCH ULTRAMINI) monitoring kit Use as directed  Qty: 1 Kit, Refills: 0    Comments: Dx: 250.00 insulin based      glucose blood VI test strips (ONETOUCH ULTRA TEST) strip Test 4 times daily as directed  Qty: 100 Strip, Refills: 11    Comments: Dx: 250.00 insulin based      Lancets (ONETOUCH ULTRASOFT LANCETS) misc Test 4 times daily as directed  Qty: 100 Each, Refills: 11    Comments: Dx: 250.00 insulin based      gabapentin (NEURONTIN) 100 mg capsule Take 1 Cap by mouth three (3) times daily.   Qty: 30 Cap, Refills: 5    Associated Diagnoses: Peripheral neuropathy      rOPINIRole (REQUIP) 0.5 mg tablet Take 1 Tab by mouth nightly as needed. Qty: 90 Tab, Refills: 1    Associated Diagnoses: RLS (restless legs syndrome)             Medications Discontinued During This Encounter   Medication Reason    enoxaparin (LOVENOX) injection 40 mg DUPLICATE ORDER    methylPREDNISolone (PF) (SOLU-MEDROL) injection 40 mg Other    vancomycin (VANCOCIN) 1250 mg in  ml infusion     vancomycin intermittent dosing placeholder     sodium chloride (NS) flush 5-40 mL     methylPREDNISolone (PF) (SOLU-MEDROL) injection 40 mg     cefTRIAXone (ROCEPHIN) 1 g in 0.9% sodium chloride (MBP/ADV) 50 mL MBP     linezolid in dextrose 5% (ZYVOX) IVPB premix in D5W 600 mg     furosemide (LASIX) injection 20 mg     0.45% sodium chloride infusion     methylPREDNISolone (PF) (SOLU-MEDROL) injection 40 mg     dextrose 5% infusion DISCONTINUED BY ANOTHER CLINICIAN    methylPREDNISolone (PF) (SOLU-MEDROL) injection 40 mg          Follow Up Orders: Follow-up Appointments   Procedures    FOLLOW UP VISIT Appointment in: Other (Specify) See your primary doctor in 3-5 days. See Dr. Mauricio Carrasco ENT, in 1-2 weeks. See pulmonology in 2-3 weeks. See your primary doctor in 3-5 days. See Dr. Mauricio Carrasco ENT, in 1-2 weeks. See pulmonology in 2-3 weeks. Standing Status:   Standing     Number of Occurrences:   1     Order Specific Question:   Appointment in     Answer:    Other (Specify)         Follow-up Information     Follow up With Specialties Details Why Contact Info    [address] [address] [address] [place] [address]    Marimar Schwartz MD Internal Medicine   [address] [place] [address] 330 [address] [place] [address]  [phone]              Discharge Exam:    Patient Vitals for the past 24 hrs:   Temp Pulse Resp BP SpO2   06/29/21 1226 97.2 °F (36.2 °C) 68 18 135/71 94 %   06/29/21 0735 97.7 °F (36.5 °C) 64 18 (!) 167/80 98 %   06/29/21 0723     99 %   06/29/21 0411 97.6 °F (36.4 °C) 64 18 136/72 100 %   06/29/21 0400  67      06/29/21 0340     100 %   06/28/21 2355  (!) 53      06/28/21 2353 97.9 °F (36.6 °C) (!) 57 17 (!) 146/71 100 %   06/28/21 2313     92 %   06/28/21 2254     94 %   06/28/21 1948 97.9 °F (36.6 °C) 67 18 (!) 168/80 91 %   06/28/21 1929     92 %   06/28/21 1645 97.3 °F (36.3 °C) 75 19 (!) 164/87 90 %     Oxygen Therapy  O2 Sat (%): 94 % (06/29/21 1226)  Pulse via Oximetry: 86 beats per minute (06/29/21 0723)  O2 Device: None (Room air) (06/29/21 1226)  Skin Assessment: Clean, dry, & intact (06/28/21 1929)  O2 Flow Rate (L/min): 1 l/min (06/27/21 2307)  FIO2 (%): 30 % (06/28/21 2313)    Estimated body mass index is 25.04 kg/m² as calculated from the following:    Height as of this encounter: 5' 5\" (1.651 m). Weight as of this encounter: 68.3 kg (150 lb 8 oz). Intake/Output Summary (Last 24 hours) at 6/29/2021 1334  Last data filed at 6/29/2021 1232  Gross per 24 hour   Intake 960 ml   Output 425 ml   Net 535 ml       *Note that automatically entered I/Os may not be accurate; dependent on patient compliance with collection and accurate  by assistants. Physical Exam:   General:          No acute distress, no use of accessory muscles   More normal voice and no stridor with inspiration.     Lungs:             Decreased to auscultation bilaterally with some crackles and transmitted sound   CV:                  Regular rate and rhythm with normal S1 and S2   Abdomen:        Soft, nontender, nondistended, normoactive bowel sounds   Extremities:     No cyanosis clubbing or edema   Neuro:             Nonfocal, A&O x3   Psych:             Normal affect       All Labs from Last 24 Hrs:  Recent Results (from the past 24 hour(s))   GLUCOSE, POC    Collection Time: 06/28/21  4:42 PM   Result Value Ref Range    Glucose (POC) 170 (H) 65 - 100 mg/dL    Performed by Sandy Vernon, POC    Collection Time: 06/28/21  9:23 PM Result Value Ref Range    Glucose (POC) 217 (H) 65 - 100 mg/dL    Performed by Oswaldo(Melanie)    GLUCOSE, POC    Collection Time: 06/29/21  6:18 AM   Result Value Ref Range    Glucose (POC) 50 (L) 65 - 100 mg/dL    Performed by Francisca    GLUCOSE, POC    Collection Time: 06/29/21  6:51 AM   Result Value Ref Range    Glucose (POC) 104 (H) 65 - 100 mg/dL    Performed by Oswaldo(Melanie)    GLUCOSE, POC    Collection Time: 06/29/21 11:04 AM   Result Value Ref Range    Glucose (POC) 239 (H) 65 - 100 mg/dL    Performed by JoseBettyPCT        All Micro Results     Procedure Component Value Units Date/Time    COVID-19 RAPID TEST [[id number]]     Order Status: Sent     CULTURE, URINE [[id number]] Collected: 06/24/21 2235    Order Status: Completed Specimen: Cath Urine Updated: 06/27/21 0656     Special Requests: NO SPECIAL REQUESTS        Culture result:       1000 COLONIES/mL NORMAL SKIN REDD ISOLATED                SARS-CoV-2 Lab Results  \"Novel Coronavirus\" Test: No results found for: COV2NT   \"Emergent Disease\" Test: No results found for: EDPR  \"SARS-COV-2\" Test: No results found for: XGCOVT  Rapid Test:   Lab Results   Component Value Date/Time    COVR Not detected 06/07/2021 11:20 AM            Diagnostic Imaging/Tests:   XR SWALLOW FUNC VIDEO    Result Date: 6/28/2021  Normal modified barium swallow. No fluoroscopic images obtained. Fluoroscopy time was 1.1 minutes. CT NECK SOFT TISSUE WO CONT    Result Date: 6/24/2021  1. Soft tissue thickening in the subglottic region, resulting in airway narrowing. Findings nonspecific but could be secondary to edema. 2. No peritonsillar or retropharyngeal fluid collection to suggest abscess. Epiglottis within normal limits. 3. Multiple thyroid nodules, largest in the left thyroid lobe measures up to 2.5 cm. Consider follow-up outpatient thyroid ultrasound to better evaluate.  4. Bilateral mastoid effusions, similar to prior exam. 5. Absence of IV contrast limits sensitivity. DC4     XR CHEST PORT    Result Date: 6/23/2021  1. Bilateral interstitial densities, similar to prior exam and likely sequela of atypical infection. Pulmonary edema is considered less likely. 2. Stable cardiomegaly. Echocardiogram/EKG results:  No results found for this visit on 06/23/21. EKG Results     Procedure 720 Value Units Date/Time    EKG [[id number]] Collected: 06/23/21 2247    Order Status: Completed Updated: 06/24/21 0657     Ventricular Rate 65 BPM      Atrial Rate 63 BPM      QRS Duration 92 ms      Q-T Interval 440 ms      QTC Calculation (Bezet) 457 ms      Calculated R Axis 31 degrees      Calculated T Axis 95 degrees      Diagnosis --     !! AGE AND GENDER SPECIFIC ECG ANALYSIS !! Normal sinus rhythm  Anterior infarct (cited on or before 16-APR-2021)  Abnormal ECG  When compared with ECG of 30-MAY-2021 19:48,  Junctional rhythm has replaced Sinus rhythm  Confirmed by Lorenza Gamez MD (), Jola Paget ([id number]) on 6/24/2021 6:57:27 AM            Results for orders placed or performed during the hospital encounter of 06/23/21   EKG, 12 LEAD, INITIAL   Result Value Ref Range    Ventricular Rate 65 BPM    Atrial Rate 63 BPM    QRS Duration 92 ms    Q-T Interval 440 ms    QTC Calculation (Bezet) 457 ms    Calculated R Axis 31 degrees    Calculated T Axis 95 degrees    Diagnosis       !! AGE AND GENDER SPECIFIC ECG ANALYSIS !! Normal sinus rhythm  Anterior infarct (cited on or before 16-APR-2021)  Abnormal ECG  When compared with ECG of 30-MAY-2021 19:48,  Junctional rhythm has replaced Sinus rhythm  Confirmed by Lorenza Gamez MD (), Jola Paget ([id number]) on 6/24/2021 6:57:27 AM         Procedures done this admission:  * No surgery found *        Time spent in patient discharge planning and coordination 39 minutes.       Signed By: Tavo Bill MD   [facility]    June 29, 2021  1:34 PM

## 2021-06-29 NOTE — PROGRESS NOTES
[facility] CRITICAL CARE OUTREACH NURSE PROGRESS REPORT      SUBJECTIVE: Called to assess patient secondary to outreach protocol. MEWS Score: 1 (06/28/21 1948)  Vitals:    06/28/21 1929 06/28/21 1948 06/28/21 2254 06/28/21 2313   BP:  (!) 168/80     Pulse:  67     Resp:  18     Temp:  97.9 °F (36.6 °C)     SpO2: 92% 91% 94% 92%   Weight:       Height:            LAB DATA:    Recent Labs     06/28/21  0506 06/27/21 0433 06/26/21 0314   * 149* 148*   K 4.7 4.7 4.7   * 116* 116*   CO2 29 28 27   AGAP 6* 5* 5*   * 122* 98   BUN 69* 67* 72*   CREA 2.07* 1.95* 1.81*   GFRAA 29* 32* 34*   GFRNA 24* 26* 28*   CA 9.4 9.4 9.1        Recent Labs     06/27/21 0433 06/26/21 0314   WBC 8.8 8.9   HGB 7.7* 7.5*   HCT 25.0* 24.1*   * 118*          OBJECTIVE: On arrival to room, I found patient to be resting quietly in bed. Pain Assessment  Pain Intensity 1: 10 (06/28/21 2035)  Pain Location 1: Leg  Pain Intervention(s) 1: Medication (see MAR), Repositioned  Patient Stated Pain Goal: 0                                 ASSESSMENT:  Pt responds (moans to voice). O2= 93% on RA. BP noted to be elevated. PRNs available. PLAN:  Pt has completed the outreach protocol. Will follow loosely.

## 2021-06-29 NOTE — PROGRESS NOTES
Gaetano Patterson  Admission Date: 6/23/2021             Daily Progress Note: 6/29/2021    The patient's chart is reviewed and the patient is discussed with the staff. Gaetano Patterson is an 78yoF who had a prolonged recovery from COVID-19 pneumonia with ARDS and was intubated from 12/30-1/19. She was decannulated at [facility] (after 3/9 discharge from [facility]). She also has h/o CAD, HTN, gastritis with UGIB, multiple infections (stenotrophomonas VAP, VRE UTI). 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 Thus far have been able to manage with CPAP/BiPAP and steroids. ENT has seen and plans short-term outpatient follow up to decide about trach.     Subjective:     No events overnight      Current Facility-Administered Medications   Medication Dose Route Frequency    [START ON 6/30/2021] predniSONE (DELTASONE) tablet 40 mg  40 mg Oral DAILY WITH BREAKFAST    [Held by provider] furosemide (LASIX) injection 20 mg  20 mg IntraVENous DAILY    oxyCODONE IR (ROXICODONE) tablet 5 mg  5 mg Oral Q6H PRN    gabapentin (NEURONTIN) capsule 100 mg  100 mg Oral TID    hydrALAZINE (APRESOLINE) 20 mg/mL injection 10 mg  10 mg IntraVENous Q6H PRN    methyl salicylate-menthol (BENGAY) 15-10 % cream   Topical TID PRN    metoprolol succinate (TOPROL-XL) XL tablet 12.5 mg  12.5 mg Oral DAILY    lip protectant (BLISTEX) ointment 1 Each  1 Each Topical PRN    spironolactone (ALDACTONE) tablet 12.5 mg  12.5 mg Oral DAILY    insulin glargine (LANTUS) injection 12 Units  12 Units SubCUTAneous DAILY    nystatin (MYCOSTATIN) 100,000 unit/gram powder   Topical PRN    insulin lispro (HUMALOG) injection   SubCUTAneous AC&HS    sodium chloride (OCEAN) 0.65 % nasal squeeze bottle 2 Spray  2 Spray Both Nostrils Q2H PRN    sodium chloride (NS) flush 5-40 mL  5-40 mL IntraVENous PRN    acetaminophen (TYLENOL) tablet 650 mg  650 mg Oral Q6H PRN    Or    acetaminophen (TYLENOL) suppository 650 mg  650 mg Rectal Q6H PRN    polyethylene glycol (MIRALAX) packet 17 g  17 g Oral DAILY PRN    ondansetron (ZOFRAN ODT) tablet 4 mg  4 mg Oral Q8H PRN    Or    ondansetron (ZOFRAN) injection 4 mg  4 mg IntraVENous Q6H PRN    budesonide (PULMICORT) 500 mcg/2 ml nebulizer suspension  500 mcg Nebulization BID RT    albuterol-ipratropium (DUO-NEB) 2.5 MG-0.5 MG/3 ML  3 mL Nebulization Q4H RT    heparin (porcine) injection 5,000 Units  5,000 Units SubCUTAneous Q12H    alcohol 62% (NOZIN) nasal  1 Ampule  1 Ampule Topical Q12H    sodium chloride (NS) flush 5-10 mL  5-10 mL IntraVENous Q8H    sodium chloride (NS) flush 5-10 mL  5-10 mL IntraVENous PRN       Review of Systems  Constitutional: negative for fever, chills, sweats  Cardiovascular: negative for chest pain, palpitations, syncope, edema  Gastrointestinal:  negative for dysphagia, reflux, vomiting, diarrhea, abdominal pain, or melena  Neurologic:  negative for focal weakness, numbness, headache    Objective:     Vitals:    06/29/21 0411 06/29/21 0550 06/29/21 0723 06/29/21 0735   BP: 136/72   (!) 167/80   Pulse: 64   64   Resp: 18   18   Temp: 97.6 °F (36.4 °C)   97.7 °F (36.5 °C)   SpO2: 100%  99% 98%   Weight:  150 lb 8 oz (68.3 kg)     Height:             Intake/Output Summary (Last 24 hours) at 6/29/2021 1115  Last data filed at 6/29/2021 0844  Gross per 24 hour   Intake 1220 ml   Output 675 ml   Net 545 ml       Physical Exam:   Constitution:  the patient is well developed and in no acute distress  EENMT: Normal  Respiratory: CTA inspiratory stridor to auscultation is faint and much improved. Cardiovascular:  RRR without M,G,R   Gastrointestinal: soft and non-tender; with positive bowel sounds. Musculoskeletal: warm without cyanosis. There is no lower extremity edema. Skin:  no jaundice or rashes, no wounds   Neurologic: no gross neuro deficits, alert and oriented x 3    CXR:       CT Neck soft tissue 6/24  1. Soft tissue thickening in the subglottic region, resulting in airway  narrowing. Findings nonspecific but could be secondary to edema. 2. No peritonsillar or retropharyngeal fluid collection to suggest abscess. Epiglottis within normal limits. 3. Multiple thyroid nodules, largest in the left thyroid lobe measures up to 2.5  cm. Consider follow-up outpatient thyroid ultrasound to better evaluate. 4. Bilateral mastoid effusions, similar to prior exam.  5. Absence of IV contrast limits sensitivity. LAB  Recent Labs     06/29/21  1104 06/29/21  0651 06/29/21  0618 06/28/21  2123 06/28/21  1642   GLUCPOC 239* 104* 50* 217* 170*      Recent Labs     06/27/21  0433   WBC 8.8   HGB 7.7*   HCT 25.0*   *     Recent Labs     06/28/21  0506 06/27/21  0433   * 149*   K 4.7 4.7   * 116*   CO2 29 28   * 122*   BUN 69* 67*   CREA 2.07* 1.95*   CA 9.4 9.4       Assessment:  (Medical Decision Making)     Hospital Problems  Date Reviewed: 6/28/2021        Codes Class Noted POA    TANISHA (obstructive sleep apnea) (Chronic) ICD-10-CM: G47.33  ICD-9-CM: 327.23  6/28/2021 Yes        Stridor ICD-10-CM: R06.1  ICD-9-CM: 786.1  6/25/2021 Yes        * (Principal) 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  ICD-9-CM: 478.30  6/25/2021 Yes        Acute hypercapnic respiratory failure (HCC) ICD-10-CM: J96.02  ICD-9-CM: 518.81  6/24/2021 Yes        Insomnia (Chronic) ICD-10-CM: G47.00  ICD-9-CM: 780.52  6/15/2015 Yes            Here with vocal cord paralysis. Seen by speech and MBS completed. Hx TANISHA and wasn't compliant. Now agreeable. She has diastolic dysfunction and Group 2 PH- diuresing and added low dose spironolactone. Plan:  (Medical Decision Making)     -- Will need  f/u with Dr. Con King upon discharge  -- speech following, MBS normal  -- watch BS, steroids decreased to daily  --  eval for home CPAP. She agrees to resume therapy and adhere to these recommendations. She needs new equipment.    -- Office follow up in the sleep lab arranged  -- She is on RA      More than 50% of the time documented was spent in face-to-face contact with the patient and in the care of the patient on the floor/unit where the patient is located. Nothing to add, will sign off.     Ramandeep Rosales MD

## 2021-06-29 NOTE — PROGRESS NOTES
06/28/21 2517   Oxygen Therapy   O2 Sat (%) 94 %   O2 Device BIPAP   Respiratory   Respiratory (WDL) X   CPAP/BIPAP   CPAP/BIPAP Start/Stop On   Device Mode BIPAP   $$ Bipap Daily Yes   Mask Type and Size Full face   Skin Condition Intact   PIP Observed 11 cm H20   IPAP (cm H2O) 10 cm H2O   EPAP (cm H2O) 5 cm H2O   Inspiratory Time (sec) 1 seconds   Vt Spont (ml) 394 ml   Ve Observed (l/min) 8.4 l/min   Backup Rate 14   Total RR (Spontaneous) 21 breaths per minute   Insp Rise Time (sec) 2   Leak (Estimated) 0 L/min   Pt's Home Machine No   Biomedical Check Performed Yes   Settings Verified Yes   Alarm Settings   High Pressure 30   Low Pressure 2   Apnea 20   Low Ve 2   High Rate 40   Low Rate 10   Pulmonary Toilet   Pulmonary Toilet H. O.B elevated     Patient placed on QHS BiPAP. No distress noted at this time.

## 2021-07-06 PROBLEM — J96.00 ACUTE RESPIRATORY FAILURE (HCC): Status: ACTIVE | Noted: 2021-01-01

## 2021-07-07 PROBLEM — R56.9 SEIZURE (HCC): Status: ACTIVE | Noted: 2021-01-01

## 2021-07-07 NOTE — ED NOTES
Pt more responsive to stimuli. Opening eyes on command. Nodding yes and no to simple questions.  Family has gone home for the night and will return in the am.

## 2021-07-07 NOTE — PROGRESS NOTES
Skin:   Old tracheostomy site. Long scar on abdomen. Old PEG site. Scattered excoriations and scarring on all four extremities. Sacrum intact and blanchable. Floy Carbine placed and patient turned.

## 2021-07-07 NOTE — ACP (ADVANCE CARE PLANNING)
Advance Care Planning     General Advance Care Planning (ACP) Conversation      Date of Conversation: 7/6/2021    Healthcare Decision Maker:     Primary Decision Maker: Kleber Catalan - Child - [phone]    Today we documented Decision Maker(s) consistent with ACP documents on file. Content/Action Overview:   LW/HCPOA on file. Daughter, Marie Douglas, listed agent for pt. Full code per MD orders presently. Staff to discuss with Zohreh Rogers as pt had previously been DNR.      Length of Voluntary ACP Conversation in minutes:  <16 minutes (Non-Billable)    Addie Menendez RN

## 2021-07-07 NOTE — PROGRESS NOTES
Chart reviewed and pt discussed in am IDR. Pt is re-admit from [facility]. Now intubated/vent -40% fio2 -Fentanyl gtt. Pt known to CM from previous admission. Daughter, Ean Sorto. Will discuss return to SNF with daughter when pt stable and off vent. CM following for any assist and d/c needs/POC. Care Management Interventions  PCP Verified by CM: Yes  Mode of Transport at Discharge:  Other (see comment)  Transition of Care Consult (CM Consult): SNF (admitted from [facility])  Current Support Network: [address]  Confirm Follow Up Transport: Other (see comment)  Freedom of Choice List was Provided with Basic Dialogue that Supports the Patient's Individualized Plan of Care/Goals, Treatment Preferences and Shares the Quality Data Associated with the Providers?: Yes  [address] Provided?:  [facility])  Discharge Location  Discharge Placement: Unable to determine at this time

## 2021-07-07 NOTE — PROGRESS NOTES
ICU Pulmonary Daily Progress Note      Jeffry Ahn    7/7/2021     Patient is a 78 y.o.  female presents with acute respiratory failure,Patient is well known to us, she had prolong hospital stay due to COVID-19 respiratory failure in 12/2020 -1/2021, had trach, was on HD due to acute renal failure, also had cardiac arrest, she eventually went to [facility] and to [facility] afterwards. 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 After her recent hospital stay in 6/2021. Her daughter is here and helps with the history. Today she as having trouble breathing and when EMS arrived to [facility] patient was in distress, sat were in 50's and she was intubated. She is currently sedated,on vent.,she is hypothermic also -94 and also has elevated WBC. Date of Admission:  7/6/2021    The patient's chart is reviewed and the patient is discussed with the staff. Subjective:     Patient since yesterday on Vent off diprovan. Still on Fentanyl. Just walked into room and noted to have seizures now. Giving patient ativan now. Review of Systems  Review of systems not obtained due to patient factors.     Allergies   Allergen Reactions    Sulfa (Sulfonamide Antibiotics) Swelling       Current Facility-Administered Medications   Medication Dose Route Frequency    LORazepam (ATIVAN) injection 1 mg  1 mg IntraVENous Q6H PRN    cefepime (MAXIPIME) 1 g in 0.9% sodium chloride (MBP/ADV) 50 mL MBP  1 g IntraVENous Q24H    insulin regular (NOVOLIN R, HUMULIN R) injection   SubCUTAneous Q6H    pantoprazole (PROTONIX) 40 mg in 0.9% sodium chloride 10 mL injection  40 mg IntraVENous DAILY    linezolid in dextrose 5% (ZYVOX) IVPB premix in D5W 600 mg  600 mg IntraVENous Q12H    heparin (porcine) injection 5,000 Units  5,000 Units SubCUTAneous Q12H    0.9% sodium chloride infusion 250 mL  250 mL IntraVENous PRN    insulin glargine (LANTUS) injection 15 Units  15 Units SubCUTAneous DAILY    propofoL (DIPRIVAN) 10 mg/mL injection  0-50 mcg/kg/min IntraVENous TITRATE    fentaNYL in normal saline (pf) 25 mcg/mL infusion  0-200 mcg/hr IntraVENous TITRATE    0.9% sodium chloride infusion  75 mL/hr IntraVENous CONTINUOUS           Objective:     Vitals:    07/07/21 0932 07/07/21 0946 07/07/21 1001 07/07/21 1016   BP: (!) 154/72 (!) 140/68 (!) 143/71 131/63   Pulse: 72 68 75 67   Resp: 23 25 25 24   Temp: 98.7 °F (37.1 °C) 98.7 °F (37.1 °C) 98.7 °F (37.1 °C) 98.7 °F (37.1 °C)   SpO2: 100% 100% 100% 100%   Weight:       Height:         Ventilator Settings  Mode FIO2 Rate Tidal Volume Pressure PEEP   VC+  30 %    450 ml     8 cm H20      Peak airway pressure: 28 cm H2O   Minute ventilation: 11 l/min     07/05 1901 - 07/07 0700  In: -   Out: 900 [Urine:900]      PHYSICAL EXAM     Constitutional:  the patient is well developed and noted unwell with ?seizures  EENMT:  Sclera clear, pupils equal, oral mucosa moist  Respiratory: clear b/l  Cardiovascular:  RRR without M,G,R  Gastrointestinal: soft and non-tender; with positive bowel sounds. Musculoskeletal: warm without cyanosis. There is no lower extremity edema. Skin:  no jaundice or rashes, no wounds   Neurologic: noted to have rthymic eyes fluttering, jaw with tremmor now. Not able to see pupils. No response to pain. Plantar down.    Psychiatric:  Sedated, on vent     CXR: on 7/6 -- less congestion since intubated              Recent Labs     07/07/21  0455 07/06/21 2137   WBC 14.9* 17.5*   HGB 6.3* 7.0*   HCT 19.3* 22.3*   * 148*   INR  --  0.9     Recent Labs     07/06/21 2137      K 4.7   *   *   CO2 25   BUN 53*   CREA 1.64*   MG 2.9*   CA 7.3*   ALB 2.5*   TBILI 0.2   ALT 43     Recent Labs     07/06/21  2105   PHI 7.26*   PCO2I 57.1*   PO2I 561*   HCO3I 25.8     Recent Labs     07/06/21  2333 07/06/21  2137   LAC 2.7* 1.9     UA positive  procal <0.05    Cultures  U/C pending  B/C x 3 pending    Assessment:  (Medical Decision Making)     Hospital Problems  Date Reviewed: 6/30/2021        Codes Class Noted POA    Acute respiratory failure ([facility] ICD-10-CM: J96.00  ICD-9-CM: 518.81  7/6/2021 Yes        TANISHA (obstructive sleep apnea) (Chronic) ICD-10-CM: G47.33  ICD-9-CM: 327.23  6/28/2021 Yes        Sepsis ([facility] ICD-10-CM: A41.9  ICD-9-CM: 038.9, 995.91  8/8/2019 Unknown        Essential hypertension (Chronic) ICD-10-CM: I10  ICD-9-CM: 401.9  6/15/2015 Yes        Chronic kidney disease, stage III (moderate) (HCC) (Chronic) ICD-10-CM: N18.30  ICD-9-CM: 585.3  6/15/2015 Yes        Gastroesophageal reflux disease without esophagitis (Chronic) ICD-10-CM: K21.9  ICD-9-CM: 530.81  6/15/2015 Yes        * (Principal) Diabetes mellitus with hyperosmolarity without hyperglycemic hyperosmolar nonketotic coma ([facility] (Chronic) ICD-10-CM: E11.00  ICD-9-CM: 250.20  11/24/2014 Yes            Patient with DM/HTN/CKD/TANISHA/prior VC paralysis with trach/ s/p trach and was in rehab and came in with respiratory distress and unresponsive. Now with seizures. Plan:  (Medical Decision Making)     --giving patient ativan stat and noted movements in face/eyes have stopped  --load with keepra now  --CT head STAT  --EEG stat  --neurology consult --> I spoke with neurology NP about consult  --on Cefepime /Linezolid D2 - as she recently had VRE UTI. Cultures negative  --continue vent support and get new ABG today. - gentle hydration  - ISS and elevated. Given lantus. Sugar now at 300 (just checked)  - GI/DVT prophylaxis -- continue protonix/stop heparin SQ and place on SCD for now  --continue remaining treatment. Condition is critical  Time spent was 33 minutes. I spoke with Daughter Fahad Pelletier ([phone]). She reports patient told her 2 weeks ago she wanted all aggressive care, so the daughter revoked patient's DNR. She is aware currently acutely ill and hopefully will get more information for her shortly.          More than 50% of the time documented was spent in face-to-face contact with the patient and in the care of the patient on the floor/unit where the patient is located.     Kamar Eldridge MD

## 2021-07-07 NOTE — H&P
HISTORY AND PHYSICAL      Coleman Burks    7/6/2021    Date of Admission:  7/6/2021    The patient's chart is reviewed and the patient is discussed with the staff. Subjective:     Patient is a 78 y.o.  female presents with acute respiratory failure,Patient is well known to us, she had prolong hospital stay due to COVID-19 respiratory failure in 12/2020 -1/2021, had trach, was on HD due to acute renal failure, also had cardiac arrest, she eventually went to [facility] and to [facility] afterwards. 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 After her recent hospital stay in 6/2021. Her daughter is here and helps with the history. Today she as having trouble breathing and when EMS arrived to [facility] patient was in distress, sat were in 50's and she was intubated. She is currently sedated,on vent.,she is hypothermic also -94 and also has elevated WBC. Review of Systems  Review of systems not obtained due to patient factors.     Patient Active Problem List   Diagnosis Code    Diabetes mellitus with hyperosmolarity without hyperglycemic hyperosmolar nonketotic coma (HCC) E11.00    Coronary artery disease involving coronary bypass graft of native heart without angina pectoris I25.810    Uncontrolled type 2 diabetes mellitus with hyperglycemia ([facility] [address] E11.65    Essential hypertension I10    HLD (hyperlipidemia) E78.5    Chronic kidney disease, stage III (moderate) ([facility]) N18.30    Osteopenia M85.80    Vitamin D deficiency E55.9    Gastroesophageal reflux disease without esophagitis K21.9    Other allergic rhinitis J30.89    Peripheral neuropathy G62.9    Primary osteoarthritis involving multiple joints M89.49    Insomnia G47.00    RLS (restless legs syndrome) R02.27    Metabolic encephalopathy G38.32    Sepsis ([facility]) A41.9    Anemia D64.9    Generalized weakness R53.1    Decreased oral intake R63.8    Acute on chronic renal failure ([facility]) N17.9, N18.9    Noncompliance Z91.19    Hypoalbuminemia due to protein-calorie malnutrition ([facility]) E88.09, E46    COVID-19 U07.1    Acute respiratory distress syndrome (ARDS) due to severe acute respiratory syndrome coronavirus 2 (SARS-CoV-2) ([facility]) U07.1, J80    Acute hypoxemic respiratory failure ([facility]) J96.01    Cardiac arrest ([facility]) I46.9    CHF exacerbation ([facility]) I50.9    Acute diastolic CHF (congestive heart failure) ([facility]) W10.11    Diastolic CHF, chronic ([facility]) I50.32    Pulmonary HTN ([facility]) I27.20    UTI (urinary tract infection) N39.0    Chronic hypoxemic respiratory failure ([facility]) J96.11    Hypercapnemia R06.89    Abnormal LFTs R94.5    Constipation K59.00    Myoclonus G25.3    History of COVID-19 Z86.16    Acute hypercapnic respiratory failure ([facility]) J96.02    Stridor R06.1    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 Taking? Blood-Glucose Meter (ONETOUCH ULTRAMINI) monitoring kit   No No   Sig: Use as directed   Insulin Needles, Disposable, 31 gauge x 5/16\" ndle   No No   Sig: by SubCUTAneous route Before breakfast, lunch, dinner and at bedtime. Lancets (ONETOUCH ULTRASOFT LANCETS) misc   No No   Sig: Test 4 times daily as directed   Omeprazole delayed release (PRILOSEC D/R) 20 mg tablet   Yes No   Sig: Take 20 mg by mouth daily. albuterol-ipratropium (DUO-NEB) 2.5 mg-0.5 mg/3 ml nebu   No No   Sig: 3 mL by Nebulization route every four (4) hours as needed for Wheezing or Shortness of Breath. ascorbic acid, vitamin C, (Vitamin C) 500 mg tablet   Yes No   Sig: Take  by mouth every other day. aspirin delayed-release 81 mg tablet   Yes No   Sig: Take  by mouth daily. clopidogrel (PLAVIX) 75 mg tab   Yes No   Sig: Take 75 mg by mouth. ferrous sulfate 325 mg (65 mg iron) tablet   Yes No   Sig: Take  by mouth every other day. furosemide (LASIX) 20 mg tablet   No No   Sig: Take 1 Tablet by mouth every other day.   gabapentin (NEURONTIN) 100 mg capsule   No No   Sig: Take 1 Cap by mouth three (3) times daily. glucose blood VI test strips (ONETOUCH ULTRA TEST) strip   No No   Sig: Test 4 times daily as directed   insulin lispro (HUMALOG) 100 unit/mL injection   No No   Si-10 Units by SubCUTAneous route Before breakfast, lunch, and dinner. For Blood Sugar (mg/dL) of:     Less than 150 =   0 units           150 -199 =   2 units  200 -249 =   4 units  250 -299 =   6 units  300 -349 =   8 units  350 and above = 10 units   metoprolol succinate (TOPROL-XL) 25 mg XL tablet   No No   Sig: Take 0.5 Tabs by mouth daily. predniSONE (DELTASONE) 20 mg tablet   No No   Sig: Take 40 mg by mouth daily (with breakfast) for 5 days. rOPINIRole (REQUIP) 0.5 mg tablet   No No   Sig: Take 1 Tab by mouth nightly as needed. spironolactone (ALDACTONE) 25 mg tablet   No No   Sig: Take 0.5 Tablets by mouth daily for 15 days.       Facility-Administered Medications: None       Past Medical History:   Diagnosis Date    Acute cystitis without hematuria 2019    Acute pancreatitis 2019    CAD (coronary artery disease)     Diabetic ketoacidosis ([facility] [address] 3/8/2020    DM2 (diabetes mellitus, type 2) (HCC)     Elevated liver function tests 2019    Encephalopathy 2021    Gout     Gram-negative bacteremia 2019    HLD (hyperlipidemia)     HTN (hypertension)     Peripheral neuropathy     Right lower quadrant abdominal pain 2019     Past Surgical History:   Procedure Laterality Date    HX CHOLECYSTECTOMY      jennifer in 1964    HX CORONARY ARTERY BYPASS GRAFT      x3    HX TUBAL LIGATION      IR INSERT NON TUNL CVC OVER 5 YRS  2021    IL CARDIAC SURG PROCEDURE UNLIST      stents x 3      Social History     Socioeconomic History    Marital status: SINGLE     Spouse name: Not on file    Number of children: Not on file    Years of education: Not on file    Highest education level: Not on file   Occupational History    Occupation: retired    Tobacco Use    Smoking status: Never Smoker    Smokeless tobacco: Never Used   Substance and Sexual Activity    Alcohol use: Yes     Comment: socially    Drug use: No    Sexual activity: Not on file   Other Topics Concern    Not on file   Social History Narrative    Lives alone but has a caregiver who helps several hours per day, several days per week     Social Determinants of Health     Financial Resource Strain:     Difficulty of Paying Living Expenses:    Food Insecurity:     Worried About Running Out of Food in the Last Year:     [address] in the Last Year:    Transportation Needs:     Lack of Transportation (Medical):      Lack of Transportation (Non-Medical):    Physical Activity:     Days of Exercise per Week:     Minutes of Exercise per Session:    Stress:     Feeling of Stress :    Social Connections:     Frequency of Communication with Friends and Family:     Frequency of Social Gatherings with Friends and Family:     Attends [employer] Services:     Active Member of Clubs or Organizations:     Attends Club or Organization Meetings:     Marital Status:    Intimate Partner Violence:     Fear of Current or Ex-Partner:     Emotionally Abused:     Physically Abused:     Sexually Abused:      Family History   Problem Relation Age of Onset    Hypertension Mother     Cancer Mother     Diabetes Mother     Heart Disease Mother      Allergies   Allergen Reactions    Sulfa (Sulfonamide Antibiotics) Swelling       Current Facility-Administered Medications   Medication Dose Route Frequency    propofoL (DIPRIVAN) 10 mg/mL injection  0-50 mcg/kg/min IntraVENous TITRATE    fentaNYL in normal saline (pf) 25 mcg/mL infusion  0-200 mcg/hr IntraVENous TITRATE    cefepime (MAXIPIME) 2 g in 0.9% sodium chloride (MBP/ADV) 100 mL MBP  2 g IntraVENous NOW    furosemide (LASIX) injection 40 mg  40 mg IntraVENous NOW     Current Outpatient Medications   Medication Sig    spironolactone (ALDACTONE) 25 mg tablet Take 0.5 Tablets by mouth daily for 15 days.  albuterol-ipratropium (DUO-NEB) 2.5 mg-0.5 mg/3 ml nebu 3 mL by Nebulization route every four (4) hours as needed for Wheezing or Shortness of Breath.  insulin lispro (HUMALOG) 100 unit/mL injection 0-10 Units by SubCUTAneous route Before breakfast, lunch, and dinner. For Blood Sugar (mg/dL) of:     Less than 150 =   0 units           150 -199 =   2 units  200 -249 =   4 units  250 -299 =   6 units  300 -349 =   8 units  350 and above = 10 units    furosemide (LASIX) 20 mg tablet Take 1 Tablet by mouth every other day.  metoprolol succinate (TOPROL-XL) 25 mg XL tablet Take 0.5 Tabs by mouth daily.  ferrous sulfate 325 mg (65 mg iron) tablet Take  by mouth every other day.  Omeprazole delayed release (PRILOSEC D/R) 20 mg tablet Take 20 mg by mouth daily.  ascorbic acid, vitamin C, (Vitamin C) 500 mg tablet Take  by mouth every other day.  aspirin delayed-release 81 mg tablet Take  by mouth daily.  Insulin Needles, Disposable, 31 gauge x 5/16\" ndle by SubCUTAneous route Before breakfast, lunch, dinner and at bedtime.  clopidogrel (PLAVIX) 75 mg tab Take 75 mg by mouth.  Blood-Glucose Meter (ONETOUCH ULTRAMINI) monitoring kit Use as directed    glucose blood VI test strips (ONETOUCH ULTRA TEST) strip Test 4 times daily as directed    Lancets (ONETOUCH ULTRASOFT LANCETS) misc Test 4 times daily as directed    gabapentin (NEURONTIN) 100 mg capsule Take 1 Cap by mouth three (3) times daily.  rOPINIRole (REQUIP) 0.5 mg tablet Take 1 Tab by mouth nightly as needed.            Objective:     Vitals:    07/06/21 2123 07/06/21 2131 07/06/21 2146 07/06/21 2201   BP:  (!) 96/53 (!) 98/54 (!) 104/54   Pulse: (!) 59 65 (!) 58 (!) 58   Resp: 20  23 25   Temp:       SpO2: 100% 100% 100% 100%   Weight: Height:           PHYSICAL EXAM     Constitutional:  the patient is well developed and in no acute distress,on vent   EENMT:  Sclera clear, pupils equal, oral mucosa moist  Respiratory: clear  Cardiovascular:  RRR without M,G,R  Gastrointestinal: soft and non-tender; with positive bowel sounds. Musculoskeletal: warm without cyanosis. There is no lower extremity edema. Skin:  no jaundice or rashes, no wounds   Neurologic: no gross neuro deficits     Psychiatric:  Sedated, on vent     CXR:        CXR Results  (Last 48 hours)               07/06/21 2109  XR CHEST PORT Final result    Impression:  1. The endotracheal tube tip lies 1.7 cm above the troy. 2. Improved interstitial lung edema or pneumonitis, with mild residual.   3. Cardiomegaly and a prior sternotomy. Narrative:  EXAM: Chest x-ray. INDICATION: Respiratory failure. COMPARISON: Prior chest x-ray on June 23, 2021. TECHNIQUE: Frontal view chest x-ray. FINDINGS: The new endotracheal tube tip lies 1.7 cm above the troy. Previously   seen prominent interstitial lung markings have improved, with mild residual.   Again noted is cardiomegaly and a prior sternotomy. No pneumothorax or pleural   effusion is seen.                  Recent Labs     07/06/21 2137   WBC 17.5*   HGB 7.0*   HCT 22.3*   *   INR 0.9     Recent Labs     07/06/21 2137      K 4.7   *   *   CO2 25   BUN 53*   CREA 1.64*   MG 2.9*   CA 7.3*   ALB 2.5*   TBILI 0.2   ALT 43     Recent Labs     07/06/21 2105   PHI 7.26*   PCO2I 57.1*   PO2I 561*   HCO3I 25.8     Recent Labs     07/06/21 2137   LAC 1.9       Assessment:  (Medical Decision Making)     Hospital Problems  Date Reviewed: 6/30/2021        Codes Class Noted POA    Acute respiratory failure ([facility] [address] ICD-10-CM: J96.00  ICD-9-CM: 518.81  7/6/2021 Yes        TANISHA (obstructive sleep apnea) (Chronic) ICD-10-CM: J81.12  ICD-9-CM: 327.23  6/28/2021 Yes        Sepsis ([facility] [address] ICD-10-CM: A41.9  ICD-9-CM: 038.9, 995.91  8/8/2019 Unknown        Essential hypertension (Chronic) ICD-10-CM: I10  ICD-9-CM: 401.9  6/15/2015 Yes        Chronic kidney disease, stage III (moderate) (HCC) (Chronic) ICD-10-CM: N18.30  ICD-9-CM: 585.3  6/15/2015 Yes        Gastroesophageal reflux disease without esophagitis (Chronic) ICD-10-CM: K21.9  ICD-9-CM: 530.81  6/15/2015 Yes        * (Principal) Diabetes mellitus with hyperosmolarity without hyperglycemic hyperosmolar nonketotic coma ([facility] [address] (Chronic) ICD-10-CM: E11.00  ICD-9-CM: 250.20  11/24/2014 Yes              Plan:  (Medical Decision Making)     --Will admit for further medical management to  ICU, on vent   -- pan culture, may have UTI- will start empiric Cefepime /Linezolid - as she recently had VRE UTI   -check procal, lactic acid   - gentle hydration  - ISS  - GI/DVT profylaxis   -full code ,discussed with her daughter, critically ill, spent 32 min     More than 50% of the time documented was spent in face-to-face contact with the patient and in the care of the patient on the floor/unit where the patient is located.     Tony Estrella MD

## 2021-07-07 NOTE — PROGRESS NOTES
Pt noted to have a HR of 43, but pt has been sinus kevyn in the 50's prior. Pt received lorazepam following ictal episode. Dr. Ragini Kolb was notified, no atropine or other treatment indicated at this time. Additionally, at 1301, temp was noted to be 96.8 so shauna hugger was applied. Will further monitor HR as temperature increases as well.

## 2021-07-07 NOTE — PROGRESS NOTES
Ventilator check complete; patient has a #7. 0 ET tube secured at the 24 at the lip. Patient is sedated. Patient is not able to follow commands. Breath sounds are coarse and crackles. Trachea is midline, Negative for subcutaneous air, and chest excursion is symmetric. Patient is also Negative for cyanosis and is Negative for pitting edema. All alarms are set and audible. Resuscitation bag is at the head of the bed.       Ventilator Settings  Mode FIO2 Rate Tidal Volume Pressure PEEP I:E Ratio   VC+  40 %    450 ml     8 cm H20         Peak airway pressure: 24 cm H2O   Minute ventilation: 9.23 l/min       Newton Miranda, RT

## 2021-07-07 NOTE — ROUTINE PROCESS
Ventilator check complete; patient has a #7. 0 ET tube secured at the 24 at the lip. Patient is sedated. Patient is not able to follow commands. Breath sounds are coarse and crackles. Trachea is midline, Negative for subcutaneous air, and chest excursion is symmetric. Patient is also Negative for cyanosis and is Negative for pitting edema. All alarms are set and audible. Resuscitation bag is at the head of the bed.       Ventilator Settings  Mode FIO2 Rate Tidal Volume Pressure PEEP I:E Ratio   VC+  40 %    450 ml     8 cm H20          Peak airway pressure: 26 cm H2O   Minute ventilation: 11.1 l/min        HARRY Pastrana, RRT

## 2021-07-07 NOTE — PROGRESS NOTES
Physician Progress Note      PATIENT:               Viktoria Yu  CSN #:                  [id number]  :                       1940  ADMIT DATE:       2021 10:32 PM  [address] DATE:        2021 4:20 PM  RESPONDING  PROVIDER #:        Madyson Suazo MD          QUERY TEXT:    Patient admitted with stridor and found to have bilateral vocal cord paralysis . Noted documentation of hx (metabolic) encephalopathy H&P and in Pulmonary Consult note, but not within the D/c Summary. On arrival, ED notes, \"Pt Alert and oriented to person place and time\". No acute distress, speaks in clear, fluid sentencesIn order to support the diagnosis of encephaolopathy, please include additional clinical indicators in your documentation. Or please document if the diagnosis of encephalopathy has been ruled out after further study. The medical record reflects the following:  Risk Factors:Voacl Cord Paralysis, Ac Resp Failure, hx COVID  Clinical Indicators:H&P notes Encephalopathy (2021) with Patient Active Problem List.. Pulmonary Consult note  includes Metabolic Encephalopathy within the active problem list. IM PN of  includes prior hx with dx encounter from hospital stay 19 to include narrative: ? History: Critically ill, encephalopathic, 66-year-old woman. ?  Treatment: Daily Labs, vital signs. Supplemental Oxygen, Neuro Assessment. Options provided:  -- Metabolic Encephalopathy present on admission, Please document evidence. -- Metabolic Encephalopathy was ruled out  -- Other - I will add my own diagnosis  -- Disagree - Not applicable / Not valid  -- Disagree - Clinically unable to determine / Unknown  -- Refer to Clinical Documentation Reviewer    PROVIDER RESPONSE TEXT:    Metabolic Encephalopathy is present as evidenced by Less responsive on admission. improved with treatment of breathing problem.     Query created by: evelin mahoney on 2021 11:15 AM      Electronically signed by: Marquis Rodriguez MD 7/7/2021 6:37 AM

## 2021-07-07 NOTE — ED PROVIDER NOTES
Marley Esteves is a 78 y.o. female seen on 7/6/2021 in the [facility] EMERGENCY DEPT in room ER07/07. HPI/review of systems and physical exam are limited secondary to patient's intubated status    Chief Complaint   Patient presents with    Respiratory Distress     HPI:   78-year-old -American female with history of acute on chronic respiratory failure presented to the emergency department via EMS after being intubated prior to patient's arrival.  Patient with history of Covid 19 that resulted in intubation with prolonged need for mechanical ventilation with ultimate trach placement. Patient ultimately was weaned from the vent and extubated and had been intermittently doing better. Patient with similar episode of shortness of breath 2 weeks ago for which she was admitted to the hospital.  Patient did not require intubation at that time. Per EMS, they were called to evaluate patient secondary to patient's respiratory distress. Upon their arrival, patient was unresponsive and apneic with oxygen saturations in the 50s. Patient was intubated and had magnesium, Solu-Medrol and albuterol in route. Patient's oxygen saturation improved and patient's other vital signs were reassuring. Patient was also given Versed so she is sedated upon arrival to the emergency department. Patient is breathing over the ventilator however has not had any other significant purposeful movement. Per EMS patient had been having respiratory distress all day long per the staff at [facility].     Historian: EMS/previous medical record    REVIEW OF SYSTEMS     Review of Systems   Unable to perform ROS: Intubated       PAST MEDICAL HISTORY     Past Medical History:   Diagnosis Date    Acute cystitis without hematuria 1/30/2019    Acute pancreatitis 8/12/2019    CAD (coronary artery disease)     Diabetic ketoacidosis ([facility] [address] 3/8/2020    DM2 (diabetes mellitus, type 2) ([facility])     Elevated liver function tests 8/8/2019    Encephalopathy 2/7/2021    Gout     Gram-negative bacteremia 8/9/2019    HLD (hyperlipidemia)     HTN (hypertension)     Peripheral neuropathy     Right lower quadrant abdominal pain 8/8/2019     Past Surgical History:   Procedure Laterality Date    HX CHOLECYSTECTOMY      jennifer in 1964    HX CORONARY ARTERY BYPASS GRAFT      x3    HX TUBAL LIGATION      IR INSERT NON TUNL CVC OVER 5 YRS  1/11/2021    OH CARDIAC SURG PROCEDURE UNLIST      stents x 3 2009     Social History     Socioeconomic History    Marital status: SINGLE     Spouse name: Not on file    Number of children: Not on file    Years of education: Not on file    Highest education level: Not on file   Occupational History    Occupation: retired    Tobacco Use    Smoking status: Never Smoker    Smokeless tobacco: Never Used   Substance and Sexual Activity    Alcohol use: Yes     Comment: socially    Drug use: No   Social History Narrative    Lives alone but has a caregiver who helps several hours per day, several days per week     Social Determinants of Health     Financial Resource Strain:     Difficulty of Paying Living Expenses:    Food Insecurity:     Worried About Running Out of Food in the Last Year:     [address] in the Last Year:    Transportation Needs:     Lack of Transportation (Medical):  Lack of Transportation (Non-Medical):    Physical Activity:     Days of Exercise per Week:     Minutes of Exercise per Session:    Stress:     Feeling of Stress :    Social Connections:     Frequency of Communication with Friends and Family:     Frequency of Social Gatherings with Friends and Family:     Attends [employer] Services:     Active Member of Clubs or Organizations:     Attends Club or Organization Meetings:     Marital Status:      Prior to Admission Medications   Prescriptions Last Dose Informant Patient Reported? Taking?    Blood-Glucose Meter (ONETOUCH ULTRAMINI) monitoring kit   No No   Sig: Use as directed   Insulin Needles, Disposable, 31 gauge x 16\" ndle   No No   Sig: by SubCUTAneous route Before breakfast, lunch, dinner and at bedtime. Lancets (ONETOUCH ULTRASOFT LANCETS) misc   No No   Sig: Test 4 times daily as directed   Omeprazole delayed release (PRILOSEC D/R) 20 mg tablet   Yes No   Sig: Take 20 mg by mouth daily. albuterol-ipratropium (DUO-NEB) 2.5 mg-0.5 mg/3 ml nebu   No No   Sig: 3 mL by Nebulization route every four (4) hours as needed for Wheezing or Shortness of Breath. ascorbic acid, vitamin C, (Vitamin C) 500 mg tablet   Yes No   Sig: Take  by mouth every other day. aspirin delayed-release 81 mg tablet   Yes No   Sig: Take  by mouth daily. clopidogrel (PLAVIX) 75 mg tab   Yes No   Sig: Take 75 mg by mouth. ferrous sulfate 325 mg (65 mg iron) tablet   Yes No   Sig: Take  by mouth every other day. furosemide (LASIX) 20 mg tablet   No No   Sig: Take 1 Tablet by mouth every other day.   gabapentin (NEURONTIN) 100 mg capsule   No No   Sig: Take 1 Cap by mouth three (3) times daily. glucose blood VI test strips (ONETOUCH ULTRA TEST) strip   No No   Sig: Test 4 times daily as directed   insulin lispro (HUMALOG) 100 unit/mL injection   No No   Si-10 Units by SubCUTAneous route Before breakfast, lunch, and dinner. For Blood Sugar (mg/dL) of:     Less than 150 =   0 units           150 -199 =   2 units  200 -249 =   4 units  250 -299 =   6 units  300 -349 =   8 units  350 and above = 10 units   metoprolol succinate (TOPROL-XL) 25 mg XL tablet   No No   Sig: Take 0.5 Tabs by mouth daily. predniSONE (DELTASONE) 20 mg tablet   No No   Sig: Take 40 mg by mouth daily (with breakfast) for 5 days. rOPINIRole (REQUIP) 0.5 mg tablet   No No   Sig: Take 1 Tab by mouth nightly as needed. spironolactone (ALDACTONE) 25 mg tablet   No No   Sig: Take 0.5 Tablets by mouth daily for 15 days.       Facility-Administered Medications: None Allergies   Allergen Reactions    Sulfa (Sulfonamide Antibiotics) Swelling        PHYSICAL EXAM       Vitals:    07/06/21 2131 07/06/21 2146 07/06/21 2201 07/06/21 2247   BP: (!) 96/53 (!) 98/54 (!) 104/54 (!) 106/58   Pulse: 65 (!) 58 (!) 58 (!) 55   Resp:  23 25    Temp:       SpO2: 100% 100% 100%     Vital signs were reviewed. Physical Exam  Vitals and nursing note reviewed. Constitutional:       Appearance: She is ill-appearing (Unresponsive on ventilator). HENT:      Head: Normocephalic and atraumatic. Mouth/Throat:      Mouth: Mucous membranes are dry. Eyes:      Pupils: Pupils are equal, round, and reactive to light. Cardiovascular:      Rate and Rhythm: Normal rate and regular rhythm. Pulmonary:      Breath sounds: Rhonchi present. Comments: Intubated  Abdominal:      Palpations: Abdomen is soft. Tenderness: There is no abdominal tenderness. Comments: Multiple surgical scars   Musculoskeletal:      Right lower leg: No edema. Left lower leg: No edema. Lymphadenopathy:      Cervical: No cervical adenopathy. Skin:     General: Skin is dry.       Comments: Cool to touch   Neurological:      Comments: GCS of 3 intubated   Psychiatric:      Comments: GCS 3 intubated          MEDICAL DECISION MAKING     ED Course:    Orders Placed This Encounter    CULTURE, BLOOD    CULTURE, BLOOD    XR CHEST PORT    CBC WITH AUTOMATED DIFF    PROTHROMBIN TIME + INR    LIPASE    MAGNESIUM    METABOLIC PANEL, COMPREHENSIVE    URINALYSIS W/ RFLX MICROSCOPIC    LACTIC ACID    LACTIC ACID 2 HOUR REPEAT    Troponin High Sensitivity    Repeat Troponin at 2 hours    BLOOD GAS & LYTES, ARTERIAL    NT-PRO BNP    WEIGH PATIENT    CRITICAL CARE (ASAP ONLY)    RT--ABG WITH ELECTROLYTES    EKG, 12 LEAD, INITIAL    SALINE LOCK IV ONE TIME STAT    WILLEM IBARRA    propofoL (DIPRIVAN) 10 mg/mL injection    fentaNYL in normal saline (pf) 25 mcg/mL infusion    sodium chloride 0.9 % bolus infusion 2,117 mL    cefepime (MAXIPIME) 2 g in 0.9% sodium chloride (MBP/ADV) 100 mL MBP    furosemide (LASIX) injection 40 mg    INITIAL PHYSICIAN ORDER: INPATIENT Intensive Care; 4. Patient requires ICU level of care interventions (further clarification in H&P documentation)     Recent Results (from the past 8 hour(s))   BLOOD GAS & LYTES, ARTERIAL    Collection Time: 07/06/21  9:05 PM   Result Value Ref Range    pH (POC) 7.26 (L) 7.35 - 7.45      pCO2 (POC) 57.1 (H) 35 - 45 MMHG    pO2 (POC) 561 (H) 75 - 100 MMHG    Sodium,  136 - 145 MMOL/L    Potassium, POC 4.8 3.5 - 5.1 MMOL/L    Calcium, ionized (POC) 1.20 1. 12 - 1.32 mmol/L    Glucose,  (H) 65 - 100 MG/DL    Base deficit (POC) 1.4 mmol/L    HCO3 (POC) 25.8 22 - 26 MMOL/L    CO2, POC 27 (H) 13 - 23 MMOL/L    O2  %    Sample source ARTERIAL      SITE RIGHT BRACHIAL      JUAN ANTONIO'S TEST NOT APPLICABLE      Device: ADULT VENT      MODE Pressure regulated volume control      FIO2 100 %    Tidal volume 450      PEEP/CPAP 8      Inspiratory Time 0.95 sec    IPAP/PIP 24      Mean Airway Pressure 13 cmH2O    Respiratory Rate 20      Performed by Olayinka PITTS, POC Cannot be calculated >60 ml/min/1.73m2    GFRNA, POC Cannot be calculated >60 ml/min/1.73m2    Respiratory comment: VE9.16    CBC WITH AUTOMATED DIFF    Collection Time: 07/06/21  9:37 PM   Result Value Ref Range    WBC 17.5 (H) 4.3 - 11.1 K/uL    RBC 2.21 (L) 4.05 - 5.2 M/uL    HGB 7.0 (L) 11.7 - 15.4 g/dL    HCT 22.3 (L) 35.8 - 46.3 %    .9 (H) 79.6 - 97.8 FL    MCH 31.7 26.1 - 32.9 PG    MCHC 31.4 31.4 - 35.0 g/dL    RDW 14.4 11.9 - 14.6 %    PLATELET 833 (L) 562 - 450 K/uL    MPV 11.9 9.4 - 12.3 FL    ABSOLUTE NRBC 0.00 0.0 - 0.2 K/uL    DF AUTOMATED      NEUTROPHILS 86 (H) 43 - 78 %    LYMPHOCYTES 7 (L) 13 - 44 %    MONOCYTES 6 4.0 - 12.0 %    EOSINOPHILS 0 (L) 0.5 - 7.8 %    BASOPHILS 0 0.0 - 2.0 %    IMMATURE GRANULOCYTES 1 0.0 - 5.0 %    ABS.  NEUTROPHILS 15.1 (H) 1.7 - 8.2 K/UL    ABS. LYMPHOCYTES 1.2 0.5 - 4.6 K/UL    ABS. MONOCYTES 1.0 0.1 - 1.3 K/UL    ABS. EOSINOPHILS 0.1 0.0 - 0.8 K/UL    ABS. BASOPHILS 0.0 0.0 - 0.2 K/UL    ABS. IMM. GRANS. 0.1 0.0 - 0.5 K/UL   PROTHROMBIN TIME + INR    Collection Time: 07/06/21  9:37 PM   Result Value Ref Range    Prothrombin time 12.8 12.5 - 14.7 sec    INR 0.9     LIPASE    Collection Time: 07/06/21  9:37 PM   Result Value Ref Range    Lipase 481 (H) 73 - 393 U/L   MAGNESIUM    Collection Time: 07/06/21  9:37 PM   Result Value Ref Range    Magnesium 2.9 (H) 1.8 - 2.4 mg/dL   METABOLIC PANEL, COMPREHENSIVE    Collection Time: 07/06/21  9:37 PM   Result Value Ref Range    Sodium 143 136 - 145 mmol/L    Potassium 4.7 3.5 - 5.1 mmol/L    Chloride 111 (H) 98 - 107 mmol/L    CO2 25 21 - 32 mmol/L    Anion gap 7 7 - 16 mmol/L    Glucose 283 (H) 65 - 100 mg/dL    BUN 53 (H) 8 - 23 MG/DL    Creatinine 1.64 (H) 0.6 - 1.0 MG/DL    GFR est AA 38 (L) >60 ml/min/1.73m2    GFR est non-AA 32 (L) >60 ml/min/1.73m2    Calcium 7.3 (L) 8.3 - 10.4 MG/DL    Bilirubin, total 0.2 0.2 - 1.1 MG/DL    ALT (SGPT) 43 12 - 65 U/L    AST (SGOT) 28 15 - 37 U/L    Alk. phosphatase 116 50 - 136 U/L    Protein, total 5.5 (L) 6.3 - 8.2 g/dL    Albumin 2.5 (L) 3.2 - 4.6 g/dL    Globulin 3.0 2.3 - 3.5 g/dL    A-G Ratio 0.8 (L) 1.2 - 3.5     LACTIC ACID    Collection Time: 07/06/21  9:37 PM   Result Value Ref Range    Lactic acid 1.9 0.4 - 2.0 MMOL/L   TROPONIN-HIGH SENSITIVITY    Collection Time: 07/06/21  9:37 PM   Result Value Ref Range    Troponin-High Sensitivity 42.8 (H) 0 - 14 pg/mL   NT-PRO BNP    Collection Time: 07/06/21  9:37 PM   Result Value Ref Range    NT pro-BNP 2,277 (H) <450 PG/ML     XR CHEST PORT    Result Date: 7/6/2021  EXAM: Chest x-ray. INDICATION: Respiratory failure. COMPARISON: Prior chest x-ray on June 23, 2021. TECHNIQUE: Frontal view chest x-ray. FINDINGS: The new endotracheal tube tip lies 1.7 cm above the troy.  Previously seen prominent interstitial lung markings have improved, with mild residual. Again noted is cardiomegaly and a prior sternotomy. No pneumothorax or pleural effusion is seen. 1. The endotracheal tube tip lies 1.7 cm above the troy. 2. Improved interstitial lung edema or pneumonitis, with mild residual. 3. Cardiomegaly and a prior sternotomy. EKG interpretation: Rate 68. Normal sinus rhythm. Nonspecific T wave abnormalities. Prolonged QT    ED Course as of Jul 06 2248   Tue Jul 06, 2021 2147 Patient noted to have an elevated white blood cell count. Cefepime ordered for antibiotic coverage. Patient is also receiving 31 cc/kg bolus. [JL]   2203 Sepsis Reassessment note:     Eliceo Akins meets criteria for Sepsis -    Patient is receiving IV Fluids and Antibiotics per sepsis protocol. I did the Sepsis Reassessment at 10:03 PM 07/06/21. Jay Snider DO           [CHELSY]   [phone] Discussed with intensivist. They will admit patient for evaluation. [JL]      ED Course User Index  [JL] Tor Jolanta DO       MDM  Number of Diagnoses or Management Options  Acute respiratory failure with hypoxia ([address]  Hypothermia, initial encounter  Severe sepsis [facility])  Diagnosis management comments: 22-year-old -American female presented emergency department via EMS after being intubated in the field. Patient was treated with antibiotics, Lasix and fluids in the emergency department. Patient with elevated white blood cell count. There is not a clear nausea source of patient's infection therefore patient given empiric antibiotics. Patient will be admitted to the intensivist for further treatment and evaluation.        Amount and/or Complexity of Data Reviewed  Clinical lab tests: ordered and reviewed  Tests in the radiology section of CPT®: ordered and reviewed  Decide to obtain previous medical records or to obtain history from someone other than the patient: yes  Obtain history from someone other than the patient: yes  Review and summarize past medical records: yes  Discuss the patient with other providers: yes  Independent visualization of images, tracings, or specimens: yes    Patient Progress  Patient progress: (Guarded)       Critical Care  Performed by: Shelby Hebert DO  Authorized by: Shelby Hebert DO     Critical care provider statement:     Critical care time (minutes):  32    Critical care was necessary to treat or prevent imminent or life-threatening deterioration of the following conditions:  Circulatory failure, CNS failure or compromise, respiratory failure and sepsis    Critical care was time spent personally by me on the following activities:  Blood draw for specimens, development of treatment plan with patient or surrogate, discussions with consultants, evaluation of patient's response to treatment, examination of patient, obtaining history from patient or surrogate, ventilator management, review of old charts, re-evaluation of patient's condition, pulse oximetry, ordering and review of radiographic studies, ordering and review of laboratory studies and ordering and performing treatments and interventions        Disposition: Admitted  Diagnosis:     ICD-10-CM ICD-9-CM   1. Acute respiratory failure with hypoxia ([facility])  J96.01 518.81   2. Hypothermia, initial encounter  T68. XXXA 991.6   3. Severe sepsis ([facility] [address]  A41.9 038.9    R65.20 995.92     ____________________________________________________________________  A portion of this note was generated using voice recognition dictation software. While the note has been reviewed for accuracy, please note certain words and phrases may not be transcribed as intended and some grammatical and/or typographical errors may be present.

## 2021-07-07 NOTE — PROCEDURES
EEG REPORT       Patient: Nita Rodriguez Age: 78 y.o. MRN: [id number]    Date: 7/6/2021  Referring Provider: No ref. provider found    History: This routine 30 minute scalp EEG was recorded with video- monitoring for a 78 y.o. Helen Neighbours female who presented with encephalopathy. This EEG was performed to evaluate for focal and epileptiform abnormalities.      Nita Rodriguez   Current Facility-Administered Medications   Medication Dose Route Frequency Provider Last Rate Last Admin    LORazepam (ATIVAN) injection 1 mg  1 mg IntraVENous Q6H PRN Arianna Arredondo MD        cefepime (MAXIPIME) 1 g in 0.9% sodium chloride (MBP/ADV) 50 mL MBP  1 g IntraVENous Q24H Arianna Arredondo MD        insulin regular (Veto Million R, HUMULIN R) injection   SubCUTAneous Q6H Arianna Arredondo MD   8 Units at 07/07/21 1127    pantoprazole (PROTONIX) 40 mg in 0.9% sodium chloride 10 mL injection  40 mg IntraVENous DAILY Arianna Arredondo MD   40 mg at 07/07/21 0834    linezolid in dextrose 5% (ZYVOX) IVPB premix in D5W 600 mg  600 mg IntraVENous Q12H Arianna Arredondo  mL/hr at 07/07/21 0835 600 mg at 07/07/21 0835    0.9% sodium chloride infusion 250 mL  250 mL IntraVENous PRN Arianna Arredondo MD        insulin glargine (LANTUS) injection 15 Units  15 Units SubCUTAneous DAILY Betty Belcher MD   15 Units at 07/07/21 0835    LORazepam (ATIVAN) 2 mg/mL injection             [START ON 7/8/2021] levETIRAcetam (KEPPRA) 500 mg in 0.9% sodium chloride (MBP/ADV) 100 mL MBP  500 mg IntraVENous Q12H Betty Belcher MD        propofoL (DIPRIVAN) 10 mg/mL injection  0-50 mcg/kg/min IntraVENous TITRATE Arianna Arredondo MD   Stopped at 07/07/21 0844    fentaNYL in normal saline (pf) 25 mcg/mL infusion  0-200 mcg/hr IntraVENous TITRATE Kin Walter DO 2 mL/hr at 07/07/21 0749 50 mcg/hr at 07/07/21 0749    0.9% sodium chloride infusion  75 mL/hr IntraVENous CONTINUOUS Arianna Arredondo MD 75 mL/hr at 07/07/21 0039 75 mL/hr at 07/07/21 6833       Technical Description: This is a 21 channel digital EEG recording with time-locked video. Electrodes were placed in accordance with the 10-20 International System of Electrode Placement. Single lead EKG monitoring as well as temporal electrodes were included. EEG Description: The dominant background activity shows a slow burst suppression type pattern with some underlying irregular very low voltage polymorphic delta activity upon which mono rhythmic delta transients are noted  Activation procedures were not performed. The EKG channel demonstrated a normal sinus rhythm. Interpretation  This EEG shows a profoundly encephalopathic pattern with some burst suppression activity but no evidence of sustained seizures    Clinical correlation  This EEG reflects the presence of medication and profound cortical dysfunction.   There is some rudimentary presence of anterior posterior gradient and given the patient's documented significant hypothermia the EEG pattern may substantially change with normothermia and consideration of monitoring is appropriate    Margaret Estrada MD    Diplomate, American Board of Psychiatry and Neurology  Fellow, [address] [facility] [employer]

## 2021-07-07 NOTE — PROGRESS NOTES
Patient Hgb 6.4, one unit of PRBC's received, recheck hgb 7.0, spoke with intensivist, no new orders received.

## 2021-07-07 NOTE — CONSULTS
Comprehensive Nutrition Assessment    Type and Reason for Visit: Initial, Consult, Positive nutrition screen  Best Practice Alert for Malnutrition Screening Tool: Recently Lost Weight Without Trying: Unsure,  , Eating Poorly Due to Decreased Appetite: Yes  Tube Feeding Management (Pulmonary)    Nutrition Recommendations/Plan:   Enteral Nutrition:   Nepro via OGT at 10ml/hr progress by 10ml/hour every 6 hours to goal rate of 40ml/hour. Water flush 160ml Q4H. Add Prosource, 2 packets once daily at 0900 with 30ml free water before and after; provides additional 22gm protein and 80kcal.  At goal will provide 1664 kcal (100% estimated calorie needs), 93 grams protein (100% estimated protein needs) and 1660ml free fluid (~1ml/kcal) calculations based on 22 hours infusion. IVF:  Per MD.   Vitamin and Mineral Supplement Therapy:  Electrolyte management replacement protocol active. Labs:   BMP daily, Mg and Phos MWF. Malnutrition Assessment:  Malnutrition Status: Insufficient data (wt stable; unable to assess po intake PTA)    Nutrition Assessment:   Nutrition History: Pt well known to nutrition services from previous admissions. Last seen by [facility] Marybeth KAURZimmerman RD on 6/26. During that time pt reported eating well PTA. She was NPO d/t concerns for vocal cord paralysis. SLP advanced diet to regular with thin liquids on 6/28. Pt previously had a PEG, but it has since been removed. Pt has been wt stable since March 2021 per chart review. Nutrition Background: Pt with PMH notable for COVID 12/2020, DM II, CAD, HLD, HTN, CKD III, GERD, CHF, and osteopenia. Pt admitted for acute respiratory failure and intubated 7/6. Daily Update:  Pt intubated at time of visit. No family/visitors at bedside. Seizure like activity this morning per MD note. Discussed with RN. Abdominal Status (last documented): Active  bowel sounds. Last BM  (unknown).   Pertinent Medications: Lantus, SSI, Protonix  Drips: Fentanyl, Propofol (off at time of assessment)  IVF: NS @ 75ml/hr  Pertinent Labs:     Lab Results   Component Value Date/Time     07/07/2021 10:52 AM    K 5.2 (H) 07/07/2021 10:52 AM     (H) 07/07/2021 10:52 AM    CO2 21 07/07/2021 10:52 AM    AGAP 8 07/07/2021 10:52 AM     (H) 07/07/2021 10:52 AM    BUN 52 (H) 07/07/2021 10:52 AM    CREA 1.65 (H) 07/07/2021 10:52 AM    GFRAA 38 (L) 07/07/2021 10:52 AM    GFRNA 32 (L) 07/07/2021 10:52 AM    CA 7.1 (L) 07/07/2021 10:52 AM    MG 2.9 (H) 07/06/2021 09:37 PM    ALB 2.5 (L) 07/07/2021 10:52 AM    TP 5.3 (L) 07/07/2021 10:52 AM    GLOB 2.8 07/07/2021 10:52 AM    AGRAT 0.9 (L) 07/07/2021 10:52 AM    ALT 39 07/07/2021 10:52 AM     Noted elevated K today, starting on low K formula    Nutrition Related Findings:   (7/7) intubated with OGT in place, starting TF      Current Nutrition Therapies:  DIET NPO    Current Intake:   Average Meal Intake: NPO Average Supplement Intake: NPO      Anthropometric Measures:  Height: 5' 5\" (165.1 cm)  Current Body Wt: 68.3 kg (150 lb 9.2 oz) (7/6), Weight source: Bed scale  BMI: 25.1, Overweight (BMI 25.0-29. 9)  Admission Body Weight: 150 lb 9.2 oz (bed scale; 7/6)  Ideal Body Weight (lbs) (Calculated): 125 lbs (57 kg), 120.5 %  Usual Body Wt: 68 kg (150 lb) (per chart review), Percent weight change: 0.4          Edema: Facial: No Edema (7/7/2021  7:01 AM)  Generalized: No Edema (7/7/2021  7:01 AM)  LLE: No Edema (7/7/2021  7:01 AM)  LUE: No Edema (7/7/2021  7:01 AM)  RLE: No Edema (7/7/2021  7:01 AM)  RUE: No Edema (7/7/2021  7:01 AM)     Estimated Daily Nutrient Needs:  Energy (kcal/day): 2825-7741 (Kcal/kg (20-25), Weight Used: Admission (68.3kg))  Protein (g/day):  (1.2-2gm/kg) Weight Used: (Admission (68.3kg))  Fluid (ml/day):   (1 ml/kcal)    Nutrition Diagnosis:   · Inadequate oral intake related to impaired respiratory function as evidenced by intubation, NPO or clear liquid status due to medical condition    Nutrition Interventions:   Food and/or Nutrient Delivery: Continue NPO, Start tube feeding     Coordination of Nutrition Care: Continue to monitor while inpatient  Plan of Care discussed with Dr. Dc Avelar via Shelby Coleman RN and Jacques Chapin RN    Goals: Active Goal: Tolerate TF at goal rate within 3 days. Nutrition Monitoring and Evaluation:      Food/Nutrient Intake Outcomes: Enteral nutrition intake/tolerance  Physical Signs/Symptoms Outcomes: Biochemical data, GI status    Discharge Planning:     Too soon to determine    Electronically signed by Falguni Jackson MS, RDN, LD 7/7/2021 at 4:38 PM  Contact: [phone]

## 2021-07-07 NOTE — PROGRESS NOTES
This is a follow-up visit to the patient, providing a spiritual presence, emotional support and prayer. The patient appears to be resting. She came from a rehab facility.     Everardo Robert, [address] [facility] [place] [facility] [place], [facility]

## 2021-07-07 NOTE — PROGRESS NOTES
TRANSFER - IN REPORT:    Verbal report received from Gi Cristina, [facility] (name) on Critical access hospital  being received from ER (unit) for routine progression of care. Report consisted of patients Situation, Background, Assessment and   Recommendations(SBAR). Information from the following report(s) SBAR and ED Summary was reviewed with the receiving nurse. Opportunity for questions and clarification was provided. Assessment completed upon patients arrival to unit and care assumed.

## 2021-07-07 NOTE — ROUTINE PROCESS
Per Report handed off from Jorge TIWARI, ABG results are as follows:    PH 7.42  CO2 28.7  PaO2 99  HCO3 18.7    These values were reported to Dr. Harrison Flores.      John iZegler, RRT

## 2021-07-07 NOTE — ED NOTES
TRANSFER - OUT REPORT:    Verbal report given to Mariya Chopra (name) on Gema Varghese  being transferred to [facility](unit) for routine progression of care       Report consisted of patients Situation, Background, Assessment and   Recommendations(SBAR). Information from the following report(s) SBAR, ED Summary, Intake/Output, MAR, Recent Results and Cardiac Rhythm SB to NSR was reviewed with the receiving nurse. Lines:   Peripheral IV 07/06/21 Posterior;Right Forearm (Active)   Site Assessment Clean, dry, & intact 07/06/21 2046   Phlebitis Assessment 0 07/06/21 2046   Infiltration Assessment 0 07/06/21 2046   Dressing Status Clean, dry, & intact 07/06/21 2046   Dressing Type Transparent 07/06/21 2046   Hub Color/Line Status Pink;Flushed 07/06/21 2046   Action Taken Blood drawn 07/06/21 2046       Peripheral IV 07/06/21 Anterior; Left External jugular (Active)   Site Assessment Clean, dry, & intact 07/06/21 2050   Phlebitis Assessment 0 07/06/21 2050   Infiltration Assessment 0 07/06/21 2050   Dressing Status Clean, dry, & intact 07/06/21 2050   Dressing Type Transparent 07/06/21 2050   Hub Color/Line Status [place] 07/06/21 2050   Action Taken Catheter retaped 07/06/21 2050       Peripheral IV 07/06/21 Right Antecubital (Active)   Site Assessment Clean, dry, & intact 07/06/21 2200   Phlebitis Assessment 0 07/06/21 2200   Infiltration Assessment 0 07/06/21 2200   Dressing Status Clean, dry, & intact 07/06/21 2200   Dressing Type Transparent 07/06/21 2200   Hub Color/Line Status Pink; Infusing 07/06/21 2200        Opportunity for questions and clarification was provided.       Patient transported with:   Monitor  O2 @ vent liters  Registered Nurse

## 2021-07-07 NOTE — ED TRIAGE NOTES
Pt arrives emergently via EMS from [facility] c/o respiratory distress. Arrives intubated and unresponsive. Given 5 mg albuterol x 2, Versed 5 mg, 125 mg Solumedrol, 2 gm Mag. PER EMS Tubed with Etomidate PTA  EMS vitals 110/60, HR 70-80. Initial O2 sat in 50's on RA. Sats on Arrival mid 90's.

## 2021-07-07 NOTE — PROGRESS NOTES
Upon reassessment, pt was noted to have rapid eye movement and frequent biting at the ETT. Dr. Jensen Lora was at the bedside and suspected an ictal episode. 2 mg of lorazepam were pushed and the episode ended. STAT CT was ordered.

## 2021-07-07 NOTE — PROGRESS NOTES
Neurology Daily Progress Note     Assessment:     78year old female, known to neurology, with history of respiratory failure 2/2 COVID-19, cardiac arrest, and possible hypoxic-ischemic encephalopathy. She was readmitted for hypoxia. We are asked to see for possible seizure. EEG was slow, no seizures or epileptiform discharges. Will obtain MRI. Patient was started on cefepime today, which can lower seizure threshold. Plan:     Patient was started on cefepime, which can lower seizure threshold and cause/worsen encephalopathy. Recommend alternative agent if possible     Continue Keppra     EEG    Subjective: Interval history:    Patient had possible seizure like activity this morning. Had rapid eye movements and increased respirations. Received Ativan and this subsided. No tonic-clonic activity. Remains minimally responsive. Head CT was negative. EEG was diffusely slow. History:    Otilia Sy is a 78 y.o. female who is being seen for seizure like activity. Unable to obtain ROS due to AMS.       Objective:     Vitals:    07/07/21 1202 07/07/21 1216 07/07/21 1232 07/07/21 1246   BP: (!) 91/52 (!) 91/55 (!) 93/54 (!) 111/53   Pulse: (!) 51 (!) 47 (!) 46 (!) 46   Resp: 24   24   Temp: 97.9 °F (36.6 °C) 97.3 °F (36.3 °C) 97.1 °F (36.2 °C) 96.9 °F (36.1 °C)   SpO2: 100% 100% 100% 100%   Weight:       Height:              Current Facility-Administered Medications:     LORazepam (ATIVAN) injection 1 mg, 1 mg, IntraVENous, Q6H PRN, Remington Renteria MD    cefepime (MAXIPIME) 1 g in 0.9% sodium chloride (MBP/ADV) 50 mL MBP, 1 g, IntraVENous, Q24H, Remington Renteria MD    insulin regular (NOVOLIN R, HUMULIN R) injection, , SubCUTAneous, Q6H, Remington Renteria MD, 8 Units at 07/07/21 1127    pantoprazole (PROTONIX) 40 mg in 0.9% sodium chloride 10 mL injection, 40 mg, IntraVENous, DAILY, Remington Renteria MD, 40 mg at 07/07/21 0834    linezolid in dextrose 5% (ZYVOX) IVPB premix in D5W 600 mg, 600 mg, IntraVENous, Q12H, Akin Ham MD, Last Rate: 150 mL/hr at 07/07/21 0835, 600 mg at 07/07/21 0835    0.9% sodium chloride infusion 250 mL, 250 mL, IntraVENous, PRN, Akin Ham MD    insulin glargine (LANTUS) injection 15 Units, 15 Units, SubCUTAneous, DAILY, Coni Belcher MD, 15 Units at 07/07/21 0835    LORazepam (ATIVAN) 2 mg/mL injection, , , ,     [START ON 7/8/2021] levETIRAcetam (KEPPRA) 500 mg in 0.9% sodium chloride (MBP/ADV) 100 mL MBP, 500 mg, IntraVENous, Q12H, Coni Belcher MD    propofoL (DIPRIVAN) 10 mg/mL injection, 0-50 mcg/kg/min, IntraVENous, TITRATE, Akin Ham MD, Stopped at 07/07/21 0844    fentaNYL in normal saline (pf) 25 mcg/mL infusion, 0-200 mcg/hr, IntraVENous, TITRATE, Kin Walter DO, Last Rate: 2 mL/hr at 07/07/21 0749, 50 mcg/hr at 07/07/21 0749    0.9% sodium chloride infusion, 75 mL/hr, IntraVENous, CONTINUOUS, Akin Ham MD, Last Rate: 75 mL/hr at 07/07/21 0039, 75 mL/hr at 07/07/21 0039    Recent Results (from the past 12 hour(s))   CBC WITH AUTOMATED DIFF    Collection Time: 07/07/21  4:55 AM   Result Value Ref Range    WBC 14.9 (H) 4.3 - 11.1 K/uL    RBC 1.97 (L) 4.05 - 5.2 M/uL    HGB 6.3 (LL) 11.7 - 15.4 g/dL    HCT 19.3 (L) 35.8 - 46.3 %    MCV 98.0 (H) 79.6 - 97.8 FL    MCH 32.0 26.1 - 32.9 PG    MCHC 32.6 31.4 - 35.0 g/dL    RDW 14.3 11.9 - 14.6 %    PLATELET 912 (L) 833 - 450 K/uL    MPV 12.4 (H) 9.4 - 12.3 FL    ABSOLUTE NRBC 0.00 0.0 - 0.2 K/uL    NEUTROPHILS 96 (H) 43 - 78 %    LYMPHOCYTES 2 (L) 13 - 44 %    MONOCYTES 1 (L) 4.0 - 12.0 %    EOSINOPHILS 0 (L) 0.5 - 7.8 %    BASOPHILS 0 0.0 - 2.0 %    IMMATURE GRANULOCYTES 1 0.0 - 5.0 %    ABS. NEUTROPHILS 14.4 (H) 1.7 - 8.2 K/UL    ABS. LYMPHOCYTES 0.3 (L) 0.5 - 4.6 K/UL    ABS. MONOCYTES 0.1 0.1 - 1.3 K/UL    ABS. EOSINOPHILS 0.0 0.0 - 0.8 K/UL    ABS. BASOPHILS 0.0 0.0 - 0.2 K/UL    ABS. IMM.  GRANS. 0.1 0.0 - 0.5 K/UL    DF AUTOMATED     RBC, ALLOCATE    Collection Time: 07/07/21  5:30 AM   Result Value Ref Range    HISTORY CHECKED? Historical check performed    TYPE & SCREEN    Collection Time: 07/07/21  5:37 AM   Result Value Ref Range    Crossmatch Expiration 07/10/2021,2359     ABO/Rh(D) B POSITIVE     Antibody screen NEG     Unit number [id number]     Blood component type  LRIR     Unit division 00     Status of unit ISSUED     Crossmatch result Compatible    GLUCOSE, POC    Collection Time: 07/07/21  5:37 AM   Result Value Ref Range    Glucose (POC) 490 (HH) 65 - 100 mg/dL    Performed by Romero    GLUCOSE, POC    Collection Time: 07/07/21  7:52 AM   Result Value Ref Range    Glucose (POC) 416 (H) 65 - 100 mg/dL    Performed by [address] [place] [address], POC    Collection Time: 07/07/21 10:01 AM   Result Value Ref Range    Glucose (POC) 332 (H) 65 - 100 mg/dL    Performed by Debbie    CBC W/O DIFF    Collection Time: 07/07/21 10:52 AM   Result Value Ref Range    WBC 14.2 (H) 4.3 - 11.1 K/uL    RBC 2.06 (L) 4.05 - 5.2 M/uL    HGB 6.4 (LL) 11.7 - 15.4 g/dL    HCT 19.9 (L) 35.8 - 46.3 %    MCV 96.6 79.6 - 97.8 FL    MCH 31.1 26.1 - 32.9 PG    MCHC 32.2 31.4 - 35.0 g/dL    RDW 14.5 11.9 - 14.6 %    PLATELET 044 670 - 990 K/uL    MPV 11.8 9.4 - 12.3 FL    ABSOLUTE NRBC 0.00 0.0 - 0.2 K/uL   METABOLIC PANEL, COMPREHENSIVE    Collection Time: 07/07/21 10:52 AM   Result Value Ref Range    Sodium 140 136 - 145 mmol/L    Potassium 5.2 (H) 3.5 - 5.1 mmol/L    Chloride 111 (H) 98 - 107 mmol/L    CO2 21 21 - 32 mmol/L    Anion gap 8 7 - 16 mmol/L    Glucose 298 (H) 65 - 100 mg/dL    BUN 52 (H) 8 - 23 MG/DL    Creatinine 1.65 (H) 0.6 - 1.0 MG/DL    GFR est AA 38 (L) >60 ml/min/1.73m2    GFR est non-AA 32 (L) >60 ml/min/1.73m2    Calcium 7.1 (L) 8.3 - 10.4 MG/DL    Bilirubin, total 0.1 (L) 0.2 - 1.1 MG/DL    ALT (SGPT) 39 12 - 65 U/L    AST (SGOT) 18 15 - 37 U/L    Alk.  phosphatase 114 50 - 136 U/L    Protein, total 5.3 (L) 6.3 - 8.2 g/dL    Albumin 2.5 (L) 3.2 - 4.6 g/dL Globulin 2.8 2.3 - 3.5 g/dL    A-G Ratio 0.9 (L) 1.2 - 3.5     GLUCOSE, POC    Collection Time: 07/07/21 11:27 AM   Result Value Ref Range    Glucose (POC) 333 (H) 65 - 100 mg/dL    Performed by Brittni Bradford      CT Results  (Last 48 hours)               07/07/21 1114  CT HEAD WO CONT Final result    Impression:  Stable chronic changes without acute intracranial abnormality. Narrative:  EXAMINATION: HEAD CT WITHOUT CONTRAST 7/7/2021 11:14 AM       ACCESSION NUMBER: [id number]       INDICATION: Seizures. COMPARISON: CT head, 5/5/2021       TECHNIQUE: Multiple-row detector helical CT examination of the head without   intravenous contrast.        Radiation dose reduction techniques were used for this study:  Our CT scanners   use one or all of the following: Automated exposure control, adjustment of the   mA and/or kVp according to patient's size, iterative reconstruction. FINDINGS:   No evidence of intracranial mass, hemorrhage, or acute large territorial   infarct. 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 Generalized parenchymal volume loss with commensurate   enlargement of the ventricles and sulci. The ventricles remain midline and   symmetric. Basal cisterns are patent. There is scattered deep and   periventricular white matter lucency which is nonspecific but unchanged and most   compatible with chronic microvascular ischemic changes. No extra-axial fluid   collection or mass effect. The orbital contents are within normal limits. The paranasal sinuses are clear. The mastoid air cells and middle ears are clear. No significant osseous or   extracranial soft tissue lesions. Physical Exam:  General - Chronically ill appearing female. HEENT - Trach midline  Neck - Supple without masses  Extremities - Peripheral pulses intact. No edema and no rashes.      Neurological examination    Level of consciousness- Sedated, comatose. Does not respond to voice or vigorous stimuli. Brain stem reflexes  -Pupillary reflex to bright light:2 mm, equal, round, reactive  -Ciliospinal reflexes: present   -Oculovestibular and/or oculocephalic reflex response: present   -Corneal reflex: n/a   -Facial movement to painful stimulus at supraorbital nerve, temporomandibular joint: no response   -Cough/gag reflex: n/a     Motor examination  -Muscle tone: decreased throughout   -Motor response to pain: no response to pain. -Muscle stretch reflexes: absent throughout   -Response to plantar stimulation: mute bilaterally           Signed By: Matilde Sheffield NP     July 7, 2021    Seen and examined. Fairly poorly responsive at the present time having received Ativan earlier went over the history of the episode with the patient's nurse also discussed the issue with patient's daughter.     I strongly suspect that the overall situation from a neurologic standpoint is not going to be reversible but will await MRI scan to comment further    Current appearance of her pupils does suggest a brainstem etiology but again await MRI

## 2021-07-07 NOTE — INTERDISCIPLINARY ROUNDS
Interdisciplinary team rounds were held 7/7/2021 with the following team members:Nursing, Nutrition, Occupational Therapy, Palliative Care, Pastoral Care, Pharmacy, Physical Therapy, Physician, Respiratory Therapy and Clinical Coordinator and the patient. Plan of care discussed. See clinical pathway and/or care plan for interventions and desired outcomes.

## 2021-07-08 PROBLEM — R65.20 SEVERE SEPSIS (HCC): Status: ACTIVE | Noted: 2019-08-08

## 2021-07-08 NOTE — PROGRESS NOTES
Neurology Daily Progress Note     Assessment:     78year old female, known to neurology, with history of respiratory failure 2/2 COVID-19, cardiac arrest, and possible hypoxic-ischemic encephalopathy. She was readmitted for hypoxia. We are asked to see for possible seizure. EEG was slow, no seizures or epileptiform discharges. Will obtain MRI. Patient was started on cefepime today, which can lower seizure threshold. Plan:     Patient was started on cefepime, which can lower seizure threshold and cause/worsen encephalopathy. Recommend alternative agent if possible     Continue Keppra     MRI of brain. We will be able to review once it is obtained    Subjective: Interval history:    Overall unchanged clinically. Resists forced eye opening. Awaiting MRI. History:    Michaela Madrid is a 78 y.o. female who is being seen for seizure like activity. Unable to obtain ROS due to AMS.       Objective:     Vitals:    07/08/21 1201 07/08/21 1211 07/08/21 1216 07/08/21 1231   BP: (!) 145/69  (!) 148/72 (!) 150/74   Pulse: (!) 51 (!) 51 (!) 51 (!) 51   Resp: 18 18 18 18   Temp: 97.8 °F (36.6 °C)  97.8 °F (36.6 °C) 97.8 °F (36.6 °C)   SpO2: 100% 100% 100% 100%   Weight:       Height:              Current Facility-Administered Medications:     insulin glargine (LANTUS) injection 10 Units, 10 Units, SubCUTAneous, DAILY, Gemma Newman MD, 10 Units at 07/08/21 0827    dexmedeTOMidine in 0.9 % NaCl (PRECEDEX) 400 mcg/100 mL (4 mcg/mL) infusion soln, 0.1-1.5 mcg/kg/hr, IntraVENous, TITRATE, Tres Belcher MD, Last Rate: 6.8 mL/hr at 07/08/21 1046, 0.4 mcg/kg/hr at 07/08/21 1046    LORazepam (ATIVAN) injection 1 mg, 1 mg, IntraVENous, Q6H PRN, Gemma Newman MD, 1 mg at 07/08/21 0105    cefepime (MAXIPIME) 1 g in 0.9% sodium chloride (MBP/ADV) 50 mL MBP, 1 g, IntraVENous, Q24H, Gemma Newman MD, Last Rate: 100 mL/hr at 07/07/21 2135, 1 g at 07/07/21 2135    insulin regular (NOVOLIN R, HUMULIN R) injection, , SubCUTAneous, Q6H, Elodia Hodgkins, MD, 6 Units at 07/08/21 1103    pantoprazole (PROTONIX) 40 mg in 0.9% sodium chloride 10 mL injection, 40 mg, IntraVENous, DAILY, Elodia Hodgkins, MD, 40 mg at 07/08/21 0827    linezolid in dextrose 5% (ZYVOX) IVPB premix in D5W 600 mg, 600 mg, IntraVENous, Q12H, Elodia Hodgkins, MD, IV Completed at 07/08/21 1027    0.9% sodium chloride infusion 250 mL, 250 mL, IntraVENous, PRN, Elodia Hodgkins, MD    levETIRAcetam (KEPPRA) 500 mg in 0.9% sodium chloride (MBP/ADV) 100 mL MBP, 500 mg, IntraVENous, Q12H, Tres Belcher MD, 500 mg at 07/08/21 1103    NUTRITIONAL SUPPORT ELECTROLYTE PRN ORDERS, , Does Not Apply, PRN, Ye, Brigitte Bateman MD    dextrose 40% (GLUTOSE) oral gel 1 Tube, 15 g, Oral, PRN, Elodia Hodgkins, MD    glucagon [facility]) injection 1 mg, 1 mg, IntraMUSCular, PRN, Elodia Hodgkins, MD    dextrose (D50W) injection syrg 12.5-25 g, 25-50 mL, IntraVENous, PRN, Elodia Hodgkins, MD, 25 g at 07/07/21 2339    propofoL (DIPRIVAN) 10 mg/mL injection, 0-50 mcg/kg/min, IntraVENous, TITRATE, Elodia Hodgkins, MD, Stopped at 07/07/21 0844    fentaNYL in normal saline (pf) 25 mcg/mL infusion, 0-200 mcg/hr, IntraVENous, TITRATE, Kin Walter DO, Last Rate: 2 mL/hr at 07/08/21 0917, 50 mcg/hr at 07/08/21 0917    0.9% sodium chloride infusion, 75 mL/hr, IntraVENous, CONTINUOUS, Elodia Hodgkins, MD, Last Rate: 75 mL/hr at 07/07/21 1639, 75 mL/hr at 07/07/21 1639    Recent Results (from the past 12 hour(s))   METABOLIC PANEL, BASIC    Collection Time: 07/08/21  3:26 AM   Result Value Ref Range    Sodium 145 136 - 145 mmol/L    Potassium 4.5 3.5 - 5.1 mmol/L    Chloride 116 (H) 98 - 107 mmol/L    CO2 20 (L) 21 - 32 mmol/L    Anion gap 9 7 - 16 mmol/L    Glucose 122 (H) 65 - 100 mg/dL    BUN 48 (H) 8 - 23 MG/DL    Creatinine 1.63 (H) 0.6 - 1.0 MG/DL    GFR est AA 39 (L) >60 ml/min/1.73m2    GFR est non-AA 32 (L) >60 ml/min/1.73m2    Calcium 7.6 (L) 8.3 - 10.4 MG/DL   MAGNESIUM    Collection Time: 07/08/21  3:26 AM   Result Value Ref Range    Magnesium 2.3 1.8 - 2.4 mg/dL   PHOSPHORUS    Collection Time: 07/08/21  3:26 AM   Result Value Ref Range    Phosphorus 1.7 (L) 2.3 - 3.7 MG/DL   CBC WITH AUTOMATED DIFF    Collection Time: 07/08/21  3:26 AM   Result Value Ref Range    WBC 12.5 (H) 4.3 - 11.1 K/uL    RBC 2.34 (L) 4.05 - 5.2 M/uL    HGB 7.4 (L) 11.7 - 15.4 g/dL    HCT 22.3 (L) 35.8 - 46.3 %    MCV 95.3 79.6 - 97.8 FL    MCH 31.6 26.1 - 32.9 PG    MCHC 33.2 31.4 - 35.0 g/dL    RDW 15.8 (H) 11.9 - 14.6 %    PLATELET 057 (L) 587 - 450 K/uL    MPV 12.2 9.4 - 12.3 FL    ABSOLUTE NRBC 0.00 0.0 - 0.2 K/uL    DF AUTOMATED      NEUTROPHILS 75 43 - 78 %    LYMPHOCYTES 15 13 - 44 %    MONOCYTES 9 4.0 - 12.0 %    EOSINOPHILS 0 (L) 0.5 - 7.8 %    BASOPHILS 0 0.0 - 2.0 %    IMMATURE GRANULOCYTES 1 0.0 - 5.0 %    ABS. NEUTROPHILS 9.4 (H) 1.7 - 8.2 K/UL    ABS. LYMPHOCYTES 1.9 0.5 - 4.6 K/UL    ABS. MONOCYTES 1.1 0.1 - 1.3 K/UL    ABS. EOSINOPHILS 0.0 0.0 - 0.8 K/UL    ABS. BASOPHILS 0.0 0.0 - 0.2 K/UL    ABS. IMM.  GRANS. 0.1 0.0 - 0.5 K/UL   BLOOD GAS, ARTERIAL POC    Collection Time: 07/08/21  4:17 AM   Result Value Ref Range    Device: ADULT VENT      FIO2 (POC) 30 %    pH (POC) 7.46 (H) 7.35 - 7.45      pCO2 (POC) 26.7 (L) 35 - 45 MMHG    pO2 (POC) 111 (H) 75 - 100 MMHG    HCO3 (POC) 18.8 (L) 22 - 26 MMOL/L    sO2 (POC) 98.7 (H) 95 - 98 %    Base deficit (POC) 4.6 mmol/L    Mode Pressure regulated volume control      Tidal volume 450 ml    PEEP/CPAP (POC) 8 cmH2O    Mean Airway Pressure 14 cmH2O    PIP (POC) 28      Allens test (POC) Positive      Inspiratory Time 0.80 sec    Site RIGHT RADIAL      Specimen type (POC) ARTERIAL      Performed by Olayinka    GLUCOSE, POC    Collection Time: 07/08/21  6:15 AM   Result Value Ref Range    Glucose (POC) 164 (H) 65 - 100 mg/dL    Performed by Romero    GLUCOSE, POC    Collection Time: 07/08/21  7:09 AM   Result Value Ref Range    Glucose (POC) 164 (H) 65 - 100 mg/dL    Performed by Ranulfo Villanueva POC    Collection Time: 07/08/21 11:02 AM   Result Value Ref Range    Glucose (POC) 256 (H) 65 - 100 mg/dL    Performed by Aj Reynolds      CT Results  (Last 48 hours)               07/07/21 1114  CT HEAD WO CONT Final result    Impression:  Stable chronic changes without acute intracranial abnormality. Narrative:  EXAMINATION: HEAD CT WITHOUT CONTRAST 7/7/2021 11:14 AM       ACCESSION NUMBER: [id number]       INDICATION: Seizures. COMPARISON: CT head, 5/5/2021       TECHNIQUE: Multiple-row detector helical CT examination of the head without   intravenous contrast.        Radiation dose reduction techniques were used for this study:  Our CT scanners   use one or all of the following: Automated exposure control, adjustment of the   mA and/or kVp according to patient's size, iterative reconstruction. FINDINGS:   No evidence of intracranial mass, hemorrhage, or acute large territorial   infarct. 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 Generalized parenchymal volume loss with commensurate   enlargement of the ventricles and sulci. The ventricles remain midline and   symmetric. Basal cisterns are patent. There is scattered deep and   periventricular white matter lucency which is nonspecific but unchanged and most   compatible with chronic microvascular ischemic changes. No extra-axial fluid   collection or mass effect. The orbital contents are within normal limits. The paranasal sinuses are clear. The mastoid air cells and middle ears are clear. No significant osseous or   extracranial soft tissue lesions. Physical Exam:  General - Chronically ill appearing female. HEENT - Trach midline  Neck - Supple without masses  Extremities - Peripheral pulses intact. No edema and no rashes.      Neurological examination    Level of consciousness- Sedated. Does not respond to voice or vigorous stimuli. Resists forced eye opening. Brain stem reflexes  -Pupillary reflex to bright light:2 mm, equal, round, reactive  -Ciliospinal reflexes: present   -Oculovestibular and/or oculocephalic reflex response: present   -Corneal reflex: n/a   -Facial movement to painful stimulus at supraorbital nerve, temporomandibular joint: no response   -Cough/gag reflex: n/a     Motor examination  -Muscle tone: decreased throughout   -Motor response to pain: no response to pain. -Muscle stretch reflexes: absent throughout   -Response to plantar stimulation: mute bilaterally           Signed By: Mimi Matos NP     July 8, 2021    Patient seen and examined. Overall poorly responsive discussed the situation with Dr. Gregoria Gusman, who knows the patient well she did recover neurologic function after prolonged period of time earlier this year.     EEG looks profoundly cortically suppressed however there is an inherent possibility that this is reversible but on the other hand if she has evidence of laminar necrosis on the MRI this would not be the case    Await MRI

## 2021-07-08 NOTE — PROGRESS NOTES
ICU Pulmonary Daily Progress Note      [facility]    7/8/2021     Patient is a 78 y.o.  female presents with acute respiratory failure,Patient is well known to us, she had prolong hospital stay due to COVID-19 respiratory failure in 12/2020 -1/2021, had trach, was on HD due to acute renal failure, also had cardiac arrest, she eventually went to [facility] and to [facility] afterwards. 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 After her recent hospital stay in 6/2021. Her daughter is here and helps with the history. Today she as having trouble breathing and when EMS arrived to [facility] patient was in distress, sat were in 50's and she was intubated. She is currently sedated,on vent.,she is hypothermic also -94 and also has elevated WBC. Patient with seziures on 7/7 and w/u in progress    Date of Admission:  7/6/2021    The patient's chart is reviewed and the patient is discussed with the staff. Subjective:     Patient in bed and on Vent. Tapered sedatives off and agitated and trying to get up. Still awaiting MRI. No more seizures since yesterday. Sugars were low in AM and lantus adjusted. On Tube feeding    Review of Systems  Review of systems not obtained due to patient factors.     Allergies   Allergen Reactions    Sulfa (Sulfonamide Antibiotics) Swelling       Current Facility-Administered Medications   Medication Dose Route Frequency    sodium phosphate 30 mmol in 0.9% sodium chloride 250 mL infusion   IntraVENous ONCE    insulin glargine (LANTUS) injection 10 Units  10 Units SubCUTAneous DAILY    LORazepam (ATIVAN) injection 1 mg  1 mg IntraVENous Q6H PRN    cefepime (MAXIPIME) 1 g in 0.9% sodium chloride (MBP/ADV) 50 mL MBP  1 g IntraVENous Q24H    insulin regular (NOVOLIN R, HUMULIN R) injection   SubCUTAneous Q6H    pantoprazole (PROTONIX) 40 mg in 0.9% sodium chloride 10 mL injection  40 mg IntraVENous DAILY    linezolid in dextrose 5% (ZYVOX) IVPB premix in D5W 600 mg  600 mg IntraVENous Q12H    0.9% sodium chloride infusion 250 mL  250 mL IntraVENous PRN    levETIRAcetam (KEPPRA) 500 mg in 0.9% sodium chloride (MBP/ADV) 100 mL MBP  500 mg IntraVENous Q12H    NUTRITIONAL SUPPORT ELECTROLYTE PRN ORDERS   Does Not Apply PRN    dextrose 40% (GLUTOSE) oral gel 1 Tube  15 g Oral PRN    glucagon (GLUCAGEN) injection 1 mg  1 mg IntraMUSCular PRN    dextrose (D50W) injection syrg 12.5-25 g  25-50 mL IntraVENous PRN    propofoL (DIPRIVAN) 10 mg/mL injection  0-50 mcg/kg/min IntraVENous TITRATE    fentaNYL in normal saline (pf) 25 mcg/mL infusion  0-200 mcg/hr IntraVENous TITRATE    0.9% sodium chloride infusion  75 mL/hr IntraVENous CONTINUOUS           Objective:     Vitals:    07/08/21 0501 07/08/21 0600 07/08/21 0601 07/08/21 0840   BP: (!) 152/76  (!) 141/68    Pulse: 61 62 61 70   Resp: 28 18 18 18   Temp: 98.2 °F (36.8 °C) 98 °F (36.7 °C) 98 °F (36.7 °C)    SpO2: 100% 96% 100% 99%   Weight:       Height:         Ventilator Settings  Mode FIO2 Rate Tidal Volume Pressure PEEP   VC+  30 %    450 ml     8 cm H20      Peak airway pressure: 27 cm H2O   Minute ventilation: 8.22 l/min     07/06 1901 - 07/08 0700  In: 2758.3 [I.V.:1618.3]  Out: 2380 [Urine:2380]      PHYSICAL EXAM     Constitutional:  the patient is well developed and not in distress  EENMT:  Sclera clear, pupils equal, oral mucosa moist, tongue is ?larger today  Respiratory: clear b/l  Cardiovascular:  RRR without M,G,R  Gastrointestinal: soft and non-tender; with positive bowel sounds. Musculoskeletal: warm without cyanosis. There is no lower extremity edema. Skin:  no jaundice or rashes, no wounds   Neurologic: noted occasional eye fluttering, no seizures like yesterday. Not following commands. Breathing above vent. No response to pain but then moving spontaneously.    Plantar absent  Psychiatric:  Mildly agitated    CXR: on 7/7 increased congestion with ETT noted low        7/6 -- less congestion since intubated              Recent Labs     07/08/21  0326 07/07/21  1655 07/07/21  1052 07/07/21  0455 07/06/21 2137 07/06/21 2137   WBC 12.5*  --  14.2* 14.9*  --  17.5*   HGB 7.4* 7.0* 6.4* 6.3*   < > 7.0*   HCT 22.3* 20.9* 19.9* 19.3*   < > 22.3*   *  --  151 141*  --  148*   INR  --   --   --   --   --  0.9    < > = values in this interval not displayed.      Recent Labs     07/08/21  0326 07/07/21  1052 07/06/21 2137    140 143   K 4.5 5.2* 4.7   * 111* 111*   * 298* 283*   CO2 20* 21 25   BUN 48* 52* 53*   CREA 1.63* 1.65* 1.64*   MG 2.3  --  2.9*   PHOS 1.7*  --   --    CA 7.6* 7.1* 7.3*   ALB  --  2.5* 2.5*   TBILI  --  0.1* 0.2   ALT  --  39 43     Recent Labs     07/08/21  0417 07/06/21  2105   PHI 7.46* 7.26*   PCO2I 26.7* 57.1*   PO2I 111* 561*   HCO3I 18.8* 25.8     Recent Labs     07/06/21  2333 07/06/21 2137   LAC 2.7* 1.9     UA positive  procal <0.05    Cultures  U/C pending  B/C x 3 pending    Assessment:  (Medical Decision Making)     Hospital Problems  Date Reviewed: 6/30/2021        Codes Class Noted POA    Seizure ([facility] ICD-10-CM: R56.9  ICD-9-CM: 780.39  7/7/2021 Unknown        Acute respiratory failure ([facility] ICD-10-CM: J96.00  ICD-9-CM: 518.81  7/6/2021 Yes        TANISHA (obstructive sleep apnea) (Chronic) ICD-10-CM: G47.33  ICD-9-CM: 327.23  6/28/2021 Yes        Severe sepsis (HCC) ICD-10-CM: A41.9, R65.20  ICD-9-CM: 038.9, 995.92  8/8/2019 Unknown        Essential hypertension (Chronic) ICD-10-CM: I10  ICD-9-CM: 401.9  6/15/2015 Yes        Chronic kidney disease, stage III (moderate) (HCC) (Chronic) ICD-10-CM: N18.30  ICD-9-CM: 585.3  6/15/2015 Yes        Gastroesophageal reflux disease without esophagitis (Chronic) ICD-10-CM: K21.9  ICD-9-CM: 530.81  6/15/2015 Yes        * (Principal) Diabetes mellitus with hyperosmolarity without hyperglycemic hyperosmolar nonketotic coma (HCC) (Chronic) ICD-10-CM: E11.00  ICD-9-CM: 250.20 11/24/2014 Yes            Patient with DM/HTN/CKD/TANISHA/prior VC paralysis with trach/ s/p trach and was in rehab and came in with respiratory distress and unresponsive. Now with seizures. Plan:  (Medical Decision Making)     --no more seizures today. Continue keepra and f/u with neurology  --MRI pending today. --seizure precautions  --ETT is low and pull up by 2 cm -- just done and now at 21 lip line. --on Cefepime /Linezolid D2 - as she recently had VRE UTI. Cultures negative  --get sputum culture since has yellow secretions per respiratory and x-ray is more congested  --continue vent support and down to 30%  --try off fentanly drip and very agitated. Had to put back on and will try precedex to see if keeps her calm. --replace lytes  --cxr mildly congested and monitor. W  - ISS and lower sugar today and had to taper lantus   --hg is up s/p transfusion yesterday and f/u. No bleeding noted. Check stool for occult blood. On PPI  - GI/DVT prophylaxis -- continue protonix/ on SCD for now  --continue remaining treatment. --full code    Condition is critical  Time spent was 35 minutes. Will speak with Kirstie Mckenzie later today, she was here yesterday and gave update. Kirstie Mckenzie ([phone]). More than 50% of the time documented was spent in face-to-face contact with the patient and in the care of the patient on the floor/unit where the patient is located.     Neetu Cortes MD

## 2021-07-08 NOTE — PROGRESS NOTES
This is a follow-up visit to the patient, providing a spiritual presence, emotional support and prayer. The patient appears to be resting.     Ladonna Clark, [address] [facility], [facility]

## 2021-07-08 NOTE — PROGRESS NOTES
Ventilator check complete; patient has a #7. 0 ET tube secured at the 23 at the lip. Patient is sedated. Patient is not able to follow commands. Breath sounds are coarse. Trachea is midline, Negative for subcutaneous air, and chest excursion is symmetric. Patient is also Negative for cyanosis and is Negative for pitting edema. All alarms are set and audible. Resuscitation bag is at the head of the bed.       Ventilator Settings  Mode FIO2 Rate Tidal Volume Pressure PEEP I:E Ratio   VC+  30 %    450 ml     8 cm H20  1:2.13      Peak airway pressure: 27 cm H2O   Minute ventilation: 11.2 l/min       Adolfo Antonio, RT

## 2021-07-08 NOTE — INTERDISCIPLINARY ROUNDS
Interdisciplinary team rounds were held 7/8/2021 with the following team members:Care Management, Nursing, Nurse Practitioner, Palliative Care, Pastoral Care, Pharmacy, Physician and Respiratory Therapy. Plan of care discussed. See clinical pathway and/or care plan for interventions and desired outcomes.

## 2021-07-08 NOTE — PROGRESS NOTES
Switched the vent to PS of 14. RR dropped to 10 with periods of apnea and volumes in the 200s. [facility].

## 2021-07-08 NOTE — PROGRESS NOTES
Bedside shift change report given to FAY Loyola (oncoming nurse) by Javier Bee RN (offgoing nurse).  Report included the following information SBAR, Kardex, ED Summary, Intake/Output, Recent Results, Med Rec Status and Cardiac Rhythm SB.

## 2021-07-08 NOTE — PROGRESS NOTES
Ventilator check complete; patient has a #7. 0 ET tube secured at the 23 at the lip. Patient is sedated. Patient is not able to follow commands. Breath sounds are coarse. Trachea is midline, negative  for subcutaneous air, and chest excursion is symmetric. Patient is also Negative for cyanosis and is Negative for pitting edema. All alarms are set and audible. Resuscitation bag is at the head of the bed.       Ventilator Settings  Mode FIO2 Rate Tidal Volume Pressure PEEP I:E Ratio   VC+  30 %   18 bpm  450 ml     8 cm H20  1:2.4      Peak airway pressure: 27 cm H2O   Minute ventilation: 8.22 l/min           Chun Casper, RT

## 2021-07-08 NOTE — PROGRESS NOTES
Bedside, Verbal and Written shift change report given to Leif Jackson RN (oncoming nurse) by Dmitriy Cedillo RN (offgoing nurse). Report included the following information SBAR, Kardex, Intake/Output, MAR, Recent Results and Cardiac Rhythm NSR.

## 2021-07-09 PROBLEM — J69.0 ASPIRATION PNEUMONIA (HCC): Status: ACTIVE | Noted: 2021-01-01

## 2021-07-09 NOTE — RT PROTOCOL NOTE
Respiratory Care Services     Policy Number: [id number]    Title: Oxygen Protocol    Effective Date: 01/1996    Revised Date: 06/2013, 02/29/2016, 4/2018, 7/2019    Reviewed Date: 05/2014, 03/2015, 06/2017, 11/2020        I. Policy: The Oxygen Protocol will be initiated for all patients upon written order from physician for administration of oxygen therapy or if a patient is found to have an oxygen saturation of 88% or less. Special consideration: the goal of oxygen therapy for COPD patients is to maintain oxygen saturation between 88% - 92% to comply with GOLD Guidelines. II. Purpose: To provide protocol driven respiratory therapy for the administration of oxygen at concentrations greater than that in ambient air with the intent of treating or preventing the symptoms and manifestations of hypoxia. III. Responsibility: Director Respiratory Care Services, all Respiratory Care Practitioners     IV. Indications:   A. Implement this protocol for patients when physician orders oxygen to be administered or when patient is found to have an oxygen saturation of 88% or less. B. To assure routine monitoring of patient's oxygen saturation b.i.d. and to make appropriate adjustments in accordance with ordered oxygen saturation parameters. C. To assure continuity of respiratory care that meets [facility] Clinical Practice Guidelines and GOLD Guidelines. D. Hb < 8  E. Sickle Cell anemia crisis    V. Assessment:  Assess the following parameters to determine the need to adjust oxygen:  A. Measurement of patient's oxygen saturation via pulse oximetry. B. Observation of patient's color, respiratory effort, and responsiveness. C. Measurement of heart rate and respiratory rate. D. Complete a three-step ambulatory oxygen saturation when ordered. VI. Initiation:  Upon receipt of an order for oxygen, the RCP will:   A.  Verify order in the patient's EMR, which should include the desired oxygen saturation to be maintained. B. The patient shall be placed on oxygen with humidity (except for those oxygen delivery devices that do not require humidity, i.e. venturi masks and non-rebreather masks) as ordered by the physician to achieve the prescribed oxygen saturation. C. In the event that no saturation is specified, a saturation of 90% will be maintained. D. Patients, who are found to have a SaO2 of 88% or less, may be started on supplemental oxygen as described above. E. Patients admitted with cardiac problems/disease shall be maintained at 92% per Chest Committee recommendation. F. The patient will be informed of the \"no smoking policy\" and instructed in the proper use of oxygen therapy. G. Once desired oxygen saturation has been achieved, the RCP will document FIO2 and oxygen saturation in the respiratory section of the patient's EMR. VII. Maintenance:   A. 30-second oxygen saturation check will be taken to maintain the saturation ordered by the physician each day. B. Patients will be assessed each shift and as needed by pulse oximetry to determine if oxygen needs to be decreased, increased or discontinued. C. If changes in FIO2 are indicated, all changes will be documented in the respiratory section of the patient's EMR. D. If no changes in FIO2 are required, the patient's oxygen flow rate and saturation will be recorded in the respiratory section of the patient's EMR. E. Per [facility], patients who are receiving oxygen therapy but are not on oxygen at home, should be weaned off oxygen as soon as possible or when anticipated discharge becomes evident. Daksha Pinna will be discontinued after oxygen saturation has been maintained for 24 hours on room air and documented in the patient's EMR. G. Patients on the Inpatient Rehabilitation area on 9th floor will be exempt from having their oxygen discontinued per protocol.  Oxygen may be weaned but will be changed to prn to meet the needs of the patient when exercising and participating in therapy. H. The goal of oxygen therapy is to maintain patients with a diagnosis of COPD at oxygen saturation between 88% - 92% to comply with GOLD Guidelines. VIII. Safety: RCP will address the following safety issues:  A. Identify patient using the two patient identifiers name and birth date via ID bracelet. B. Perform hand hygiene per hospital policy utilizing Standard Precautions for all patients and following transmission-based isolation as indicated per hospital policy. C. Cardiac patients will be maintained at an oxygen saturation of 92%. D. If a patient's FIO2 requirements necessitate changing oxygen delivery devices to a high concentration of oxygen, documentation indicating the change must be included in the progress notes, as well as in the respiratory flowsheet. E. If a patient has a hemoglobin level <8 mg. RCP will consult physician before discontinuing oxygen. IX. Interventions:   A. RCP will assess patient for signs of respiratory distress or suspicion of CO2 retention. B. An ABG may be obtained for patients exhibiting respiratory distress or sickle cell crisis. C. An order should be entered into patient's EMR for ABG under per protocol. X. Documentation  A. Document assessment findings in the respiratory section of the patient's EMR. B. Document changes in therapy per protocol in the respiratory orders section and in the care plan section of the patient's EMR. C. Document patient education in the patient education section of the patient's EMR. XI. Reportable Conditions:  Report to the physician immediately:  A. Acute changes in patient's respiratory status. B. An oxygen saturation <85%. C. A change in oxygen delivery device to provide a high concentration of oxygen. XII. Patient Instructions: Review with Patient  A. Purpose of oxygen therapy  B. Proper technique for using oxygen  C.  No smoking policy    Approval: Pulmonary Committee (1-25-96)  Revision: Chest Committee (4-28-05)    L - Respiratory Care Department Policy, Procedure and Protocol Guideline Manual, 1995,         KAITLIN Perez. L - Therapist Driven Respiratory Care Protocols  A Practitioner's Guide for Criteria-Based       Respiratory Care by Avinash Roach M.D., and J. Alverda Litten, RRT. L - The rationale for therapist-driven protocols: an update. Respiratory Care 1998. N  [facility] Clinical Practice Guidelines. Respiratory Care Services       Policy Number: [id number]    Title: Patient Care Assessment Protocol    Effective Date: 01/1999    Revised Date: 05/2014, 04/2018, 12/2018, 07/2019    Reviewed Date: 06/2013/ 03/2015, 03/2016, 06/2017, 11/2020        Overview  In an effort to improve quality and reduce costs of respiratory care at [facility], the Respiratory Department has developed a number of Patient Care Protocols. These protocols have been developed according to Marquise 3 and are utilized for those patients who are ordered respiratory therapy using therapeutic indications and standardized approaches for accomplishing objectives. Patient Care Protocols are intended to improve care by:   Defining the indications and standards of care agreed upon by the Pulmonary Medicine and [address] [place] [address] of [facility].  Training respiratory care practitioners to apply those criteria to individual patients and modify therapy as indicated by the protocols.  Documenting the indication and care plan as part of the initial ordering process.  Tapering or discontinuing treatments once the indication for therapy changes. The Patient Care Protocols shall be universally applied throughout the hospital as determined by   the Pulmonary Medicine and [address] [place] [address] Ave.     Rationale for Patient Care Assessment Protocols:   Continuous Quality Improvement   Cost containment   Standardization of care   Enhanced continuity of care   Utilization review   Timely intervention    The following patient care assessment protocols have been developed:   Aerosolized Medication Protocol    Bronchial Hygiene Protocol    Oxygen Protocol   CVRU Fast Track Weaning Protocol    Asthma Treatment Protocol ER   Pediatric Asthma Treatment Protocol ER   Alpha-1 Antitrypsin Deficiency Protocol   Prone Positioning Protocol    COPD Protocol    Home Oxygen Assessment Protocol   Ventilator Weaning Protocol    Lung Volume Expansion Protocol    The Director of [employer] Riddle oversees the Patient Care Assessment Program. The Respiratory Educator is responsible for protocol development and training. The Supervisor is responsible for implementation and  activities. Each patient with an order for respiratory treatments will receive an evaluation. Respiratory Care Practitioners (RCP's) will perform the evaluations. The same evaluation tool will be utilized for initial and follow-up assessments. If the patient does not meet criteria for ordered therapy, the therapy will be discontinued. If the patient demonstrates an adverse response to initially ordered therapy, the therapy will be discontinued and the physician will be contacted. Specific physician's orders that deviate from protocols and are deemed \"inappropriate\" or \"unsafe\" will be addressed with ordering physician and/or medical director as required. Respiratory Patient Care Assessment Protocols    I. Policy:   In an effort to provide quality patient care and effective utilization of services, physicians who order respiratory therapy will have their patients treated via the protocols established (see attached) Respiratory Care Practitioners (RCP's) will complete the initial assessment which will indicate patient needs,  the care plan developed and will performed within 24 hours of admission. Frequency of the therapy will be set according to the results of the respiratory therapy evaluation and frequency guidelines policy. Reassessment will be continued every 48 hours and more frequently as needed for the individual patient. II. Purpose:     F. To provide a process that will allow for ongoing assessment and care plan modification for patients receiving respiratory services based on both objective and or subjective patient responses to interventions. This process of protocol utilization will assist in patient care progression while eliminating the need for the physician to continually update respiratory therapy orders. G. To assure continuity of respiratory care that meets [facility] Clinical Practice Guidelines. III. Initiation:  Implement Respiratory Care Protocols for patients who are ordered by physician          to receive respiratory therapy procedures or for ventilator management. IV. Protocol:  E. Upon receiving an order for therapy the RCP will review the patient's EMR (electronic medical record) for all pertinent information includin. Physician's order for therapy  2. Patient history and physical examination  3. Physician progress notes  4. Diagnostic. X-rays, PFT's, arterial blood gases etc.  F. The RCP will perform a respiratory assessment in the following manner:  1. General observations: color, pattern and effort of breathing, chest expansion, (symmetrical and bilateral), level of consciousness and the ability to ambulate. 2. The RCP will assess patient's cough ability and determine if bronchial hygiene is needed. If patient is unable to produce sputum, at that time, the RCP should question the patient with regard to their sputum: production, color consistency, frequency and amount.   3. Auscultation: Using a stethoscope, the RCP will listen and note quality of breath sounds and presence or absence of adventitious breath sounds in all lung fields, both anteriorly and posteriorly. 4. Upon completing the EMR review and physical assessment, the RCP will document findings in the RT Assessment section of the EMR. The score level will be provided and will be used to determine the frequency of therapy. V. Indications:   A. Bronchial Hygiene Protocol indications:   1. Potential for or presence of atelectasis. 1. Need for hydration and removal of retained secretions. 1. Need for improvement of cough effectiveness. 1. Presence of conditions associated with disorder of pulmonary clearance:  a. Cystic fibrosis  b. Bronchiectasis  c. Neuromuscular disease  d. Obstructive lung diseases  e. Restrictive lung diseases   Aerosolized Medication(s) Protocol indications:Treatment of bronchospasm/wheezing  2. Improvement of mucociliary clearance  3. Treatment of stridor  4. History of Bronchiectasis, Asthma or COPD  C. Oxygen Therapy Protocol indications:   1. Documented hypoxemia  2. Severe trauma  3. Acute myocardial infarction  4. Short-term therapy (e.g. post anesthesia recovery)  D. CVRU Ventilator Weaning Protocol indications:  1. All mechanically ventilated surgical patients unless they have a no wean order. E. Asthma Treatment Protocol ER indications:  1. Patients 15years of age and older that have been triaged or diagnosed with   asthma exacerbation shall be indicated for the ER Asthma Treatment Protocol. A physician order will be required to initiate the protocol. F. Pediatric Asthma protocol in the ER indications:  1. Patients less than 15years old that have been triaged or diagnosed with asthma exacerbation shall be indicated for the Pediatric Asthma protocol. A physician order will be required to initiate the protocol. G. Alpha-1 Antitrypsin Deficiency Testing protocol indications:         1. Patients admitted and diagnosed with COPD. H. Prone Positioning Protocol indications:         1. Acute lung injury         2. Acute respiratory distress syndrome (ARDS)   I. Respiratory Care COPD Protocol indications:         1. History of COPD in patient's records         2. Smoking history   J. Home Oxygen Assessment Protocol indications:         1. Chronic lung disease         2. Cor pulmonale         3. Unable to wean to room air 48 hours prior to anticipated discharge. K.  Ventilator Weaning Protocol indications:         1. Patient's mechanically ventilated          2. Managed by intensivist  L. Lung Volume Expansion Protocol indications:        1. Any patient at risk for pulmonary complications. VI. Maintenance:    H. Timely patient assessment is an integral part of this protocol therefore the following will be applied:  1. All non- critical care patients will be evaluated upon receiving initial respiratory care orders within 24 hours and re-evaluated within 48 hours (or more as needed). 2. Orders requesting a Respiratory Consult will be responded to in the following manner:  a. In patient emergency situations, the RCP assigned to the floor will respond immediately to the patient, provide an initial respiratory assessment, and contact the patient's physician as necessary for appropriate orders. b. In non-emergent situations, the RCP assigned to the floor will respond to the patient within 90 minutes and provide an initial respiratory assessment and contact patient's physician as necessary for appropriate orders. c. An RCP will provide a comprehensive assessment as soon as possible. 3. Upon completion of an evaluation, the RCP will complete documentation in the patient's EMR in the RT Assessment section. 4. The RCP who completes the assessment will document orders for therapy in the orders section of the patient's EMR selecting new order. Next, per protocol should be selected indicating it is a protocol order and sign orders should be selected to complete the process.  The applicable protocol must be added to the progress note per Joint Commission guidelines. 5. The Pharmacy and Therapeutics (P&T) Committee has mandated that the medication Xopenex may be changed to unit dose albuterol without an order, except for those patients receiving Xopenex due to cardiac arrhythmias. 1. The dosage for these patients should be 0.63 mg. and may be changed from 1.25 mg. to 0.63 mg per P & T Committee by the RCP completing the assessment. 6. Patients who are not experiencing cardiac arrhythmias, and are ordered Xopenex and Atrovent may be changed to Duoneb. VII. Safety:        I. The following safety issues shall be monitored:  1. The RCP will perform hand hygiene per hospital policy utilizing Standard Precautions for all patients and following transmission-based isolation as indicated per hospital policy. 2. The RCP must exercise professional judgment in classifying the patient for frequency of therapy. 3. Appropriate classification of the patient will require an evaluation utilizing the Therapy Assessment Protocol Guidelines. 4. The RCP will confer with the physician concerning the care of the patient at any time questions or problems arise. 5. If during therapy, the patient exhibits no improvement, or deterioration in clinical status the RCP will notify the physician and the patient's nurse. VIII. Interventions:   F. The patient's nurse is responsible concerning all items related to his/her care. Ongoing communication with nursing is essential to successful protocol management. G. The RCP recognizes the value of the team approach in meeting the patient's needs. Nursing input regarding the patient's pulmonary condition will be sought as needed. IX. Reportable conditions: The RCP will inform the physician if:  1. There are acute changes in patient's respiratory status. 2. The therapist is unable to determine appropriate care plan upon assessment. 3. The patient fails to reach therapeutic objective.   4. A change or additional medication is needed. X.  Patient Education:    D. Patient will receive instruction on the followin. The treatment modality, including objectives and proper technique of therapy  2. Respiratory medications  E. Documentation shall occur in the patient education section of the patient's EMR. XI. Documentation: Record all findings as described above in the patient's EMR. Related Protocols: A. Aerosolized Medication Protocol  B. Bronchial Hygiene   C. Oxygen Protocol   D. [facility] Fast Track Weaning Protocol  E. Asthma Treatment protocol ER  F. Alpha-1 antitrypsin Deficiency Protocol  G. Prone Positioning Protocol  H. Respiratory Care COPD Protocol  I. Home Oxygen assessment Protocol  J. Ventilator Weaning Protocols   K. Volume Expansion protocol    Indications      Frequency          Level  A. Aerosol therapy   1.  q4h     Severe SOB, wheezing, unable to sleep 1   2.  qid, q4 wa or q6h   Moderate SOB, wheezing   2   3.  tid      Hx of asthma, or COPD mild wheezing,         or facilitate secretion removal              3   4.  bid      Asthma, or COPD, Intermittent wheezing 4   5. PRN, i.e. tid PRN, qid PRN Asthma, or COPD, occasional wheezing 5       B. Bronchopulmonary Hygiene    1. q4h             Copious secretions, SOB, unable to sleep 1   2. qid & PRN            Moderate amounts of secretions   2   3. tid           Small amounts of secretions and poor cough,               history of secretions    3    4. PRN, i.e. tid PRN, qid PRN     Breathing exercises, encourage cough only 4      C. Oxygen Therapy     Follow hospital approved Oxygen Protocol      Note:  qid treatments are due 0800, 1200, 1600, and 2000. tid treatments are due 0800, 1400, and 2000  Q6h treatments are due 0800, 1400, 2000, 0200  Q4 wa teatments are due 0800, 1200, 1600, and 2000. Q4h treatments are due  0800, 1200, 1600, 2000, 0000, and 0400.         The Level 1-5 rating system is only to be used as criteria for determining appropriate frequency of therapy. References:   N   Joint Commission Consolidated Andrea Standard   L    Respiratory Care Department Policy, Procedure and Protocol Guideline Manual, 1995, KAITLIN ALCANTAR  Therapist Driven Respiratory Care Protocols  A Practitioner's Guide for Criteria-Based Respiratory Care by Avinash Roach M.D., and J. Alverda Litten, RRT. ORTIZ  The rationale for therapist-driven protocols: an update. Respiratory Care 1998; 25:801-759   L Therapist Driven Respiratory Care Protocols  A Practitioner's Guide for Criteria-Based Respiratory Care by Avinash Roach M.D., and J. Alverda Litten, RRT. ORTIZ The rationale for therapist-driven protocols: an update. Respiratory Care 1998; [id number]. N   [facility] Clinical Practice Guidelines. D. The RCP will perform a respiratory assessment in the following manner:  1. Perform hand hygiene per hospital policy utilizing Standard Precautions for all patients and following transmission-based isolation as indicated per policy. 2. Identify patient via ID bracelet verifying patient name and birth date. 3. General observations: color, pattern and effort of breathing, chest expansion, (symmetrical and bilateral), level of consciousness and the ability to ambulate. 4. The RCP will assess patients cough ability and determine if Nasotracheal suctioning is needed. If patient is unable to produce sputum, at that time, the RCP should question the patient with regard to their sputum: production, color consistency, frequency and amount. 5. Auscultation: Using a stethoscope, the RCP will listen and note quality of breath sounds and presence or absence of adventitious breath sounds in all lung fields, both anteriorly and posteriorly. 6. Upon completing the EMR review and physical assessment, the RCP will document findings in the RT Assessment section of the EMR. The score level will be provided and will be used to determine the frequency of therapy. V. Indications:   A.   Indications for Bronchial Hygiene Protocol will include:  1. Potential for or presence of atelectasis. 2. Need for hydration and removal of retained secretions. 3. Need for improvement of cough effectiveness. 4. Presence of conditions associated with disorder of pulmonary clearance:  a. Cystic fibrosis  b. Bronchiectasis  B. Indications for Aerosolized Medication(s) Protocol should include:  1. Treatment of bronchospasm/wheezing  2. Improvement of mucociliary clearance  3. Treatment of stridor  4. History of Asthma or COPD             C.  Indications for Oxygen Therapy Protocol should include:  1. Documented hypoxemia  2. Severe trauma  3. Acute myocardial infarction  4. Short-term therapy (e.g. post anesthesia recovery)    VI. Maintenance:    D. Timely patient assessment is an integral part of this protocol therefore the following will be applied:  1. All non- critical care patients will be evaluated upon receiving initial respiratory care orders within 24 hours and re-evaluated within 48 hours (or more as needed). 2. Orders requesting a Respiratory Consult will be responded to in the following manner:  a. In patient emergency situations, the RCP assigned to the floor will respond immediately to the patient, provide an initial respiratory assessment, and contact the patients physician as necessary for appropriate orders. b. In non-emergent situations, the RCP assigned to the floor will respond to the patient within 90 minutes and provide an initial respiratory assessment and contact patients physician as necessary for appropriate orders. c. An RCP will provide a comprehensive assessment as soon as possible. 3. Upon completion of an evaluation, the RCP will complete documentation in the patients EMR in the RT Assessment section. 4. The RCP who completes the assessment will document orders for therapy in the orders section of the patients EMR selecting new order.  Next, per protocol should be selected indicating it is a protocol order and sign orders should be selected to complete the process. 5. The Pharmacy and Therapeutics (P&T) Committee has mandated that the medication Xopenex may be changed to unit dose albuterol without an order, except for those patients receiving Xopenex due to cardiac arrhythmias. The dosage for these patients should be 0.63 mg. and may be changed from 1.25 mg. to 0.63 mg per P & T Committee by the RCP completing the assessment. 6. Patients who are not experiencing cardiac arrhythmias, and are ordered Xopenex and Atrovent may be changed to Duoneb. VII. Safety:        A. The following safety issues shall be monitored:  1. The RCP will perform hand hygiene per hospital policy utilizing Standard Precautions for all patients and following transmission-based isolation as indicated per hospital policy. 2. The RCP must exercise professional judgment in classifying the patient for frequency of therapy. 3. Appropriate classification of the patient will require an evaluation utilizing the Therapy Assessment Protocol Guidelines. 4. The RCP will confer with the physician concerning the care of the patient at any time questions or problems arise. 5. If during therapy, the patient exhibits no improvement or deterioration in clinical status the RCP will notify the physician and the patients nurse. VIII. Interventions:   A. The patients nurse is responsible concerning all items related to his/her care. Ongoing communication with nursing is essential to successful protocol management. B. The RCP recognizes the value of the team approach in meeting the patients needs. Nursing input regarding the patients pulmonary condition will be sought as needed. IX. Reportable conditions: The RCP will inform the physician if:  1. There are acute changes in patients respiratory status. 2. The therapist is unable to determine appropriate care plan upon assessment.   3. The patient fails to reach therapeutic objective. 4. A change or additional medication is needed. X.  Patient Education:    A. Patient will receive instruction on the followin. The treatment modality, including objectives and proper technique of therapy  2. Respiratory medications  B. Documentation shall occur in the patient education section of the patients EMR. XI. Documentation: Record all findings as described above in the patients EMR. Related Protocols: A. Aerosolized Medication Protocol  B. Bronchial Hygiene  C. Oxygen Protocol   D. Volume Expansion/Secretion Clearance  E. Ventilator Weaning Protocols    References:  N   Joint Commission Consolidated Andrea Standard   L    Respiratory Care Department Policy, Procedure and Protocol Guideline Manual, , KAITLIN Perez. L  Therapist Driven Respiratory Care Protocols  A Practitioners Guide for Criteria-Based Respiratory Care by Everardo Disla M.D., and KAITLIN Cotter RRT. L  The rationale for therapist-driven protocols: an update. Respiratory Care 1998; [facility] Guidelines. Respiratory Care Services     Policy Number: [id number]    Title: Bronchial Hygiene Protocol    Effective Date: 1999    Revised Date: 2014, 2017, 2019    Reviewed Date: 2013, 2014, 2015, 2016, 2017, 2018, 2020     I. Purpose: The Respiratory Care Practitioner (RCP) will utilize the following protocol to select and initiate bronchial hygiene therapy to open and maintain obstructed airways when indicated. II. Patients: All patients who are ordered bronchial hygiene therapy. III. Clinical Area: All general patient floors     IV. Protocol: The following conditions or diseases are indications for bronchial hygiene                           therapy. H. Oscillating PEP Therapy        Indications should include:   5. Atelectasis caused by mucus plugging or foreign body  6.  Chronic mucociliary clearance disorders  7. Retained secretions which may be associated with the following conditions:  f. Bronchitis  g. Bronchiectasis  h. Pneumonia  I. PAP- Positive airway pressure therapy  Indications include:  5. Patients with post-operative atelectasis or to prevent post operative atelectasis. 6. Patients who cannot perform deep breathing exercises due to pain. 7. Patients requiring lung expansion therapy who cannot follow instructions. 8. Patients requiring lung expansion therapy with poor inspiratory capacity <10cc/kg. 9. Patients requiring aerosol therapy in conjunction with opening their airways. Tod Nguyen / Acoustical Airway Clearance Therapy (ACT)- i.e. (Vibralung, Vest, or Percussor)  Indications should include  5. Patient conditions  that involve retained secretions, increased mucus production and defective mucociliary clearance such as:  d. Cystic fibrosis  e. Chronic bronchitis  f. Bronchiectasis  g. Pneumonia  h. Asthma  i. Muscular dystrophy  j. Post-operative atelectasis  k. Neuromuscular respiratory impairments  l. ACT may be considered in patients with COPD with  symptomatic secretion retention, guided by patient preference,  toleration, and effectiveness of therapy Jose Veliz et al., 2013). K. Nasotracheal suctioning indications should include:  5. Inability to cough effectively  6. Excessive secretions  7. Artificial airway      V. Equipment:   A. PEP therapy device   B. Vest therapy equipment   Emaline Major   D. AccuPap   Mei Graham NT suction equipment       VI. Guidelines:   Monitor patient's vital signs and evaluate patient's clinical status. The need to                                change therapy modality may be indicated by:  Odalys Allen in patient's sensorium (patient now confused or obtunded, and unable to follow directions). H. A significant deterioration is evident on patient's chest radiograph or increased sputum production.   I. Increased thickening of secretions (e.g. mucolytic therapy may be indicated.)  J. Development of wheezing  K. Decrease in oxygen saturation  L. Development of chest pain. VII. Clinical Responsibility: The Therapy Assessment Protocol guidelines will be used to                re-evaluate all patients on bronchial hygiene therapy (See Therapy Assessment Protocol). I. RCP's will perform changes in therapy according to protocol. Yara Osborn. Bronchial hygiene therapy may be discontinued when goals of therapy are met, e.g., secretions easily expectorated for 48 hours, atelectasis is resolved, etc.  K. PAP Therapy may be utilized in place of IPPB therapy per discretion of the RCP, as approved by the Pulmonary Medicine and [address] [place] [address]. VIII. Outcome Criteria:  Outcome criteria for bronchial hygiene therapy should include:  J. Decrease in sputum production  K. Improved breath sounds  L. Improved arterial oxygen tension and/or SaO2  M. Improved chest X-ray  N. Subjective response to therapy    IX. Documentation  D. Document assessment findings in the respiratory assessment section of the patient's EMR. E. Document changes in therapy per protocol in the respiratory orders section and in the care plan section of the patient's EMR. F. Document patient education in the patient education section of the patient's EMR. X. Related Protocols:  2. Respiratory Patient Care Assessment Protocols  2. Aerosolized Medication Protocol  2. Oxygen Therapy Protocol        Reference:    L - Respiratory Care Department Policy, Procedure and Protocol Guideline Manual, 1995, KAITLIN Perez. L - Therapist Driven Respiratory Care Protocols  A Practitioner's Guide for Criteria-Based         Respiratory Care by Fernando Farrell M.D., and KAITLIN Jensen, BASIL. N  [facility] Clinical Practice Guidelines. Ranjan Murillo., Jr Saldivar., Gurney Aschoff., Aaron Ambriz., Cipriano Henriquez., . . . FLAVIO Lau (2013, December).  [facility] Clinical Practice Guideline: Effectiveness of Nonpharmacologic Airway Clearance Therapies in Hospitalized Patients. Respiratory Care, 58(13), 4918-6406. Retrieved June 28, 2019                    Guideline     Guideline Number: 7728-     Title: Management of the Patient with Mechanical Ventilation (including weaning) and ABCDE Bundle    Effective Date:  03/00    Revised Date: 02/09, 03/10, 7/12, 5/13,10/13, 8/14, 11/17, 5/18, 2/19  Reviewed Date: 07/2015, 04/2016, 06/2017, 7/19, 10/2020       I. Policy:  Management of the patient requiring mechanical ventilation, including readiness to wean and weaning protocol. The information provided serves as a guideline for patient management. Included in this guidelines is the ABCDE Bundle to provide guidance to staff for evidence based management of pain, agitation/anxiety and delirium in the intensive care unit. The goals of critical care analgesia and sedation are to facilitate mechanical ventilation, to prevent patient and caregiver injury, and to avoid the psychological and physiologic consequences of inadequate treatment of pain, anxiety, agitation, and delirium by maintaining a light level of sedation. Pain occurs commonly in adult ICU patients, regardless of admitting diagnosis. Therefore, pain should be frequently assessed and analgesic medications titrated to prevent adverse effects associated with either inadequate or excessive analgesia. Once pain has been addressed, anxiolytic and/or antipsychotic medications can be utilized to treat unresolved agitation/anxiety and delirium, with the goal to prevent over- or under sedation by using the Sterling Agitation Sedation Scale (RASS). Assertive management of these issues has been shown to reduce costs, improve ICU outcomes such as successful extubation and ICU length of stay, and allow for patients to participate in their own care. II. Purpose:   The respiratory care practitioner and the critical care RN will utilize the following guideline to provide the most efficient and effective management of mechanical ventilators and weaning and extubation processes. Goals of Treatment:  8. Adequate management of patients pain and discomfort while maintaining a light level of sedation (RASS score of 0 to -1)  9. Both chronic and acute sources of pain should be identified and treated. 10. Sedative agents should be considered if patient still expresses discomfort and/or is not at RASS goal of 0 to -1 despite adequate management of pain. 11. Patients requiring neuromuscular blockage must have continuous infusions of analgesic and sedative agents. III. Responsibility: Director Respiratory Care Services and all Respiratory Care Practitioners with documented competency as well as Critical Care RN staff. General Guidelines    1. Introduction to Ventilator Plan Phase  a. Ventilator Management Phase, General Statement  -  The plan should be initiated in patients who have a secure airway/require invasive mechanical ventilation (endotracheal or tracheostomy) only  -   The provider determines the appropriate medications used for analgesia and agitation/anxiety along with the clinical pharmacist    2. Monitoring Levels of Comfort  a. Pain Assessment  - Pain is monitored using the numerical scales  - A pain assessment should be conducted, at a minimum, every 4 hours and as needed and per guidelines. - The level of pain should be determined as satisfactory by the patient based on patients baseline level of pain , considering any chronic pain that the patient may have. - If the patient is unable to communicate pain level, the nurse can assess for nonverbal indicators including facial grimacing, moaning, tachypnea, tachycardia, hypertension, diaphoresis, etc as a cue to begin further pain assessment.     b.  Sedation Assessment  - Sedation is monitored using the Sterling Agitation Sedation Scale (RASS)  Target RASS RASS Description   +4 Combative, violent, danger to staff     +3 Pulls or removes tubes(s) or catheters; aggressive   +2 Frequent nonpurposeful movement, fights ventilator   +1 Anxious, apprehensive, but not aggressive   0 Alert and calm   -1 Awakens to voice (eye opening/contact) > 10 sec   -2 Light sedation, briefly awakens to voice (eye opening/contact) < 10 sec   -3 Moderate sedation, movement or eye opening. No eye contact   -4 Deep sedation, no response to voice, but movement or eye opening to physical stimulation   -5 Unarousable, no response to voice or physical stimulation     -  Goal RASS is 0 to -1, unless otherwise specified by providers order.  - Nursing staff should conduct the RASS every 4 hours and as needed to maintain goal RASS of 0 to -1.  - If RASS is outside of goal range, discuss treatment options with provider.  - A RASS score of +2 to +4 requires further assessment by the nurse. Causes of agitation/anxiety that should be considered include:  a. Pulmonary -   endotracheal tube malposition or patency, mode of ventilation, pneumothorax, hypoxemia, hypercarbia  b. Metabolic  hypoglycemia, hyponatremia, acute renal or hepatic failure  c. Emotional upset  with information and awareness of critical condition, prognosis, need for surgical or invasive procedures, other interventions or complications, family or personal stressors  - C. Sedation Assessment while using Neuromusclar Blocking Agents  - D. Delirium Assessment  a. the ICU (CAM  ICU)   3. Analgesia  The incidence of significant pain has been reported to be 50% or higher in both medical and surgical ICU patients. These patients also experience discomfort during routine/procedural ICU care and at rest.  However, patients may be unable to self-report their pain (either verbally or with other signs) because of an altered level of consciousness, the use of mechanical ventilation, or high doses of sedative agents or neuromuscular blocking agents.   The short and long term consequences of unrelieved or inadequately treated pain are significant and include patient discomfort, decreased satisfaction with care by family and patient, delirium, agitation/anxiety, post traumatic stress disorder and depression. Therefore, routine assessment and treatment of pain should occur in all ICU patients. Causes and Treatment of Pain in the ICU  a. Acute pain (post-operative, procedural pain, discomfort with usual ICU care or other acute episodes of pain-related to underlying disease)  10. Consider use of PCA for alert and oriented patients with pain needs not met by PRN dosing or opoids. 11. Preemptive analgesia should occur prior to chest tube removal, and should be considered for other procedural pain such as turning and repositioning, would drain removal, wound dressing change, tracheal suctioning, femoral catheter removal or place of central venous catheter. 12. Appropriate analgesic medications for preemptive analgesia are short acting intravenous (IV) agents (i.e. fentanyl, morphine, hydromorphone)  a. Administration of analgesia before patient experiences noxious stimuli prevents amplification and hyperexcitability of the central nervous system. b. Analgesia for Mechanically Ventilated Patients:  1. The approach to sedation and analgesia management for mechanically ventilated patients favors use of analgesia first sedation. The primary goal of this strategy is to address pain and discomfort first, and then if necessary, add anxiolytic agent. 2. Analgesia first sedation reduces dose escalation of medications, decreases the duration of mechanical ventilation and the incidence of VAP, improves the probability of successful extubation, and ultimately shortens ICU length of stay. 3. For pain management, analgesic medications are determined by the provider.    Intermittent dosing of the analgesic should be attempted first.    If the patient requires more than 3 doses within 1 hour then provider should be contacted to consider continuous infusion. 4.  Analgesic options for mechanically ventilated patients include:  i. Fentanyl which is considered the drug of choice for patients requiring continuous infusion. b.Morphine may be considered for those patients without renal dysfunction who are hemodynamically stable and require intermittent pain medication. Continuous infusions of morphine may be used for patientl who are receiving comfort care as part of end of life care. c.Hydromorphone is reserved for patients who are refractory to fentanyl or morphine and is typically admininstered by intermittent dosing. 4.  Agitation/Anxiety    Background  Agitation and anxiety frequently occur in ICU patients. Anxiolytic/sedation agents may be indicated to help relieve discomfort, improve synchrony with mechanical ventilation, and decrease the overall work of breathing. Pain control alone may be sufficient to make patients comfortable enough to require no anxiolytic/sedative agent. In addition, non-pharmacologic interventions such as repositioning or verbal assurance may be helpful to comfort or redirect an agitated patient. If these methods are unsuccessful, then anxiolytic/sedative medications such as propofol, dexmedetomidine, or benzodiazepines can be used. Selection of an anxiolytic should be based on the pharmacokinetic properties of the medication, patient specific characteristics, and sedation goal.   However, nonbenzodiazepine sedatives (ie propofol or dexmedetomidine) may be preferable over benzodiazepines (ie midazolam or larazepam) due to more favorable outcomes such as delirum. Causes and Treatment of Agitation/Anxiety  m. Possible underlying causes of agitation and anxiety include pain, delirium, hypoxemia, hypoglycemia, hypotension, or withdrawal from alcohol  and other drugs.   n. Analgesia first sedation should be attempted initially to manage pain and provide sedation in appropriate patients. Analgesia alone may be adequate to reach RASS goal of 0 to -1. If patient remains agitated or anxious despite adequate analgesia (ie RASS +2 to +4) then anxiolytic/sedative should be considered. o. The choice of anxiolytic should be based on desired levels of sedation (ie light sedation or deep sedation) with preference for the use of nonbenzodiazepines such as propofol or dexmedetomidine if appropriate. While light sedation (ie RASS 0 to -1) is preferred for most patients, there are instances when deep sedation (ie RASS -4 to -5) is desired. For example, in the setting of ventilator dysynchrony due to ARDS or for patients receiving NMB agents. p. Medications to maintain light sedation (ie RASS 0 to -1) include  1. Propofol continuous infusion can be considered for hemodynamically stable (ie SBP = 100, MAP = 65 and/or not requiring vasopressor support) patients requiring light sedation. Propofol has a quick onset (1-2 minutes) and offset of action, making it a good agent to assess neurological status and facilitate liberation from the mechanical ventilator. 2.Dexmedetomidine continuous infusion is a good option for hemodynamically stable patients requiring light sedation as it allows for a more awake, interactive patient is associated with less delirium. It has an intermediate onset of action (5-10 min). Therefore, abrupt titrations should be avoided, but use of prn haloperidol or benzodiazepine may be useful to manage agitation until the medication takes effect. 3. Antipsychotics are another option. In particular, haloperidol intermittently dosed may be useful for patients with symptoms of agitation/anxiety and delirium. 4.Benzodiazapines can also be considered for light sedation, but should be intermittently doses. Midazolam is an option for patients without renal dysfunction.   It has a short onset of action (2-5 minutes) making it a good agent for acute agitation/anxiety, but short duration of action resulting in frequent dosing. Lorazepam is another option. It has a longer onset of action (15-20 minutes) in comparison to midazolam, but longer duration of action. q. Medications to maintain deep sedation (RASS -4 to -5) include:  1) Propofol continuous infusion should be considered as a first line option for hemodynamically stable patients. 2)Benzodiazepines can be considered as second line options for deep sedation. Studies comparing these agents to other sedatives have shown that they lead to worse outcomes including delirium, oversedation, delayed extubation, and longer time to discharge. Midazolam is one option for patients without renal dysfunction and lorazepam is another options. If patient requires more than 3 doses within 1 hour then contact provider  to consider initiation of continuous infusion. 5.  Daily Sedation Awakening Trial (SAT) from IV Continuous Analgesia/Sedation  c. Patients are to have daily awakening from sedation while on continuous IV analgesia and/or sedation in the ICU. Continuous analgesia infusions may be maintained only if needed for active pain and RASS is at goal 0 - -1. Unit guideline is for the SAT to occur following ICP rounds each morning. d. The sedation awakening trial (SAT) is done regardless if the patient meets criteria for spontaneous breathing trial (SBT). e. SAT safety screen is assessed and SAT should not be performed if sedation is being used for active seizures, alcohol withdrawal, hemodynamically unstable or requiring support of vasoactive medications , in conjunction with NMB agents, if ICP is greater than  20mmHg or if sedation is being used to control ICP, patients RASS is +3 or +4 (very agitated or combative).   Other exclusion criteria are:  if there is documentation of myocardial ischemia in the past 24 hours; or patient is receiving high frequency oscillator ventilation (HFOV) , if the patient has an open chest /abdomen or is receiving comfort care. f. Criteria for passing the SAT are the patient opened their eyes to verbal stimuli or tolerated sedative interruption without exhibiting failure criteria.  g. Patients fail the SAT if the develop sustained anxiety, agitation, or pain; a respiratory rate of 35 per minutes for 5 minutes or longer, an SpO2 less than 88% for 5 minutes or longer; an acute cardiac dysrhythmia; two or more signs of respiratory distress including tachycardia, bradycardia; use of accessory muscles; diaphoresis; marked dyspnea; or myocardial ischemia. h. Respiratory therapy staff must verify with the nurse that continuous IV analgesia (unless being use  for active pain) and sedation (unless patient is receiving dexmedetomidine) is off prior to placing patient on SBT. i. DO NOT interrupt infusion of analgesia and sedation medications if patient is receiving neuromuscular blockade.  j. Monitor level of wakefulness unless patient is awake and follows commands (RASS 0 to -1) or patient becomes uncomfortable or agitated (RASS +3 to +4)  k. If agitation prevents successful awakening , administer bolus of analgesia and/or sedation then resume infusion of the medication at ½ previous dose and titrate as needed. l. If oversedation prevents successful awakening, hole infusion until at goal and resume ½ of prior infusion rate/dose if clinically indicated. 6. Delirium  Background  Delirium is characterized by the acute onset of cerebral dysfunction with a change or fluctuation baseline  mental status, inattention, and either disorganized thinking or an altered level of consciousness. It affects up to 80% of mechanically ventilated adult ICU patients, and is associated with increased mortality,   and treatment is important and may in turn allow for a patient to be conscious yet cooperative enough to participate   in ventilator weaning trials and early mobilization efforts.     Delirium can only be assessed in patients who are able to sufficiently interact and communicate with bedside  clinicians (ie RASS -3 to +4). IV. Procedure:  A. Assessment: The following criteria must be assessed prior to the initiation of weaning from mechanical ventilation. Note: The criteria are general guidelines and must be individualized for each patient. The patients primary nurse will be responsible, in coordination with the RT, the Spontaneous Awakening Trial). The RT will perform the SBT. B. Spontaneous Awakening Trials (SATs  also referred to as Sedation Vacation) and Spontaneous Breathing Trials (SBTs) performed to determine readiness to wean. 1. For patients who meet established criteria, such as those without active seizures, alcohol withdrawal and agitation, myocardial ischemia or those requiring cardiac support devices, without increased intracranial pressure and those not receiving neuromuscular blockade, the nurse will reduce the infusions of sedative by 50% of current used for sedation that was used to achieve a level of light sedation (Carbajal Score 2 or RASS score of 0 to -1) and evaluate patient response to reduction of sedation. Analgesics required for pain control are continued during the test.  Obtain MD order to cover no SAT for that time period if patient has any exclusion criteria as described above. 2. Failure of the spontaneous awakening trial occurs when the patient shows symptoms such as increased agitation, anxiety, pain or signs of respiratory distress including respiratory rate >35/min or oxygen saturation <88% as well as development of acute cardiac arrhythmias. If these symptoms develop during the SAT, the nurse then restarts sedation at 75% of the previous dose and titrates the medications until the patient is comfortable and/or symptoms have abated. 3.  If the patient passes the SAT then the patient moves on to the Spontaneous Breathing Trials as performed by the RT.    The SBT Safety Screen included the following:  No agitation, oxygen saturation > 88%, FIO2 < 50%, PEEP < 7.5 cm H20, no myocardial ischemia, no vasopressor use, and with inspiratory efforts. 4. Patients who pass the spontaneous awakening trial but fail the spontaneous breathing trial are placed back on full ventilator support and reassessed the next day. 5. Failure of the SBT (spontaneous breathing trail) includes any of the following:  Respiratory rate > 35/min, respiratory rate < 8/min, oxygen saturation < 88%, respiratory distress, mental status change, acute cardiac arrhythmia. 6. Extubation is considered for patients who tolerate the spontaneous awakening trial and pass the spontaneous breathing trial.    C. Can the cause of respiratory failure be reversed (i.e. absence of high spinal cord injury or advanced ALS)? D. Is gas exchange adequate? 1. PaO2/FIO2 ratio > 150  200,  2. PEEP < 8 cm H20  3. FIO2 < 50  4. pH > 7.30  5. Rapid shallow breathing index (f/VT) < 105  E. Is patient hemodynamically stable? 1. Absence of clinically significant hypotension (minimal vasopressors such as Dopamine < 5mcg/kg/minute)? F. Is there evidence of intact respiratory drive (NIP/NIF >-59 JPY99, stable VC02)? G. Does patient have an adequate cough, airway clearance ability? H. Is there absence of excessive secretions? V. Initiation:     A. The therapist shall consult with RN to determine if sedation can be discontinued or significantly decreased. If this can be achieved, the therapist shall implement the                     followin. Identify patient and verify name and account number via ID bracelet. 2. Perform hand hygiene per hospital policy utilizing Standard Precautions for all patients and following transmission-based isolation as indicated per hospital policy. 3. Perform a ONE-MINUTE SPONTANEOUS TRIAL AND ASSESSMENT.    4. Measure Rapid Shallow Breathing Index (RSBI) and monitor SpO2 and cardiovascular parameters during the spontaneous breathing assessment. 5. If SpO2 and cardiovascular parameters are stable, continue spontaneous breathing trial for at least 30 minutes and up to 120 minutes, as patient tolerates. 6. Monitor ventilatory status, SpO2, and cardiovascular status during spontaneous breathing trial.  7. If patient has a successful trial, consider patient as a candidate for extubation and obtain order. 8. If patient fails the weaning trial, place back on ventilator and adjust settings to provide a non-fatiguing form of ventilatory support for the remainder of the day and night. 9. One attempt at weaning shall be performed each day until successful weaning occurs. The RCP will make every attempt to begin the spontaneous breathing trials between 0500 and 0600 to provide documentation of the trial when the pulmonologist makes rounds. B.  Assessment of SBT or PST:  6. Is gas exchange acceptable? 7. PaO2 > 60 mm Hg. 8. PH > 7.30  9. Increase in PaCO2  < 10 mm Hg  C. Is patient hemodynamically stable? 8. HR < 120 beats/minute  9. HR  < 20%   10. Systolic BP < 801 and > 90 mmHg  11. BP  < 20%, no vasopressors required  D. Does patient have stable ventilatory pattern? 7. Sustained RR < 30 breaths per minute  8. Normal and stable VCO2  9. Patient is not demonstrating any signs of increased work of breathing, such as increased use of accessory muscles. E. Mental status stable throughout trial?  6. Absence of changes such as somnolence, excessive agitation or anxiety  7. Absence of diaphoresis during trial?  IV. Safety:    L. The RCP shall monitor patient according to the above guidelines. If at any time during the weaning process, the respiratory therapist or nurse feels that the patient is not tolerating weaning, the therapist shall place patient back on previous ventilator settings. M. The patient shall be reassessed and the weaning process should be continued the following day.      V. Reportable Conditions: A. The therapist shall notify the physician, as appropriate, for any of the following         conditions:  5. FIO2 increase (sustained) at 10% or greater  6. Poor patient/ventilator interface in spite of adjustments  7. Need for increased sedation for respiratory distress  8. Need for increasing ventilating pressures (i.e. PEEP, PIP, MAP)  9. ABG results meeting panic value criteria or other clinical signs indicating deterioration of patients condition. 10. Unplanned extubation. 11. Unexplained sustained increase in PIP greater than 10 cm H2O.  12. Assessment results regarding ventilator discontinuance process. VI. Ventilator protocol management   A. The following items should be maintained for patients who are being mechanically           ventilated. 3. Obtain STAT Chest X-Ray for ET tube placement after insertion. 4. Chest X-Ray q a.m. while on ventilator. 5. ABGs 30 - 60 minutes after being stable on the ventilator. 6. ABG's q a.m. while on ventilator and prn.  7. Do spontaneous breathing trials when patient is hemodynamically stable, responsive, and without fever. 8. Terminate trials if patient exhibits signs of respiratory distress. 9. Therapists should maintain ABG s as follows:      a. pH -  7.30 - 7.50                   b. PaO2 -   60  100        8. Racemic Epinephrine (0.5cc) for post extubation stridor (2 UA treatments max.)    VII.   Early Mobilization    Mobility Level Criteria Start at Level 1 if:   PaO2/FIO2 <250   Positive end-expiratory Pressure (PEEP) >=10 cm H2O   O2 saturation <90%   Respiratory Rate (RR) Not within 10-30 per min   Cardiac arrhythmias or ischemia New onset   Heart Rate  (HR) <60 or >120 beats per min   Mean arterial pressure (MAP) <55 or >473 mmHg   Systolic blood pressure (SBP) <90 or > 180 mmHg   Vasopressor infusion New or increasing   Sterling Agitation Scale (RASS) < - 3       Level I:   Breathe  (Rass -5 to -3)  HOB Angle  improve VAP protocol compliance   Visually confirm the [facility] is elevated >= 30 degrees to comply with VAP prevention protocols   The Centers for Disease Control and Prevention recommends an HOB angle of 30-45 degrees , unless contraindicated  Additional activities to be implemented   Every 2 hour turning   Passive range of motion   Up to 20 degrees reverse trendelenburg with lower extremity exercise/retracting footboard   Continuous lateral rotation therapy can be considered part of early mobility therapy in patients who are at high risk for pulmonary complications  Move to Level 2 when the patient  - Has acceptable oxygenation/hemodynamics  - Tolerates q 2 turning  - Tolerates HOB > 30 degrees or up to 20 degrees reverse trendelenburg    Level 2 :Tilt  Patient Assessment Rass > -3  (eg, opens eyes, may have profound weakness)  Up to 20 degrees Reverse Trendelenburg position and 10 degrees minimum HOB  - Reverse Trendelenburg positioning allows for orthostatic position in fragile patients  - If available , use in conjunction with retracting foot section to allow for partial weight bearing prior to sitting up in the bed or getting out of bed  Additional activities to be implemented  -  Maintain HOB >/= 30 degrees  - Q 2 hour turning  - Passive/active range of motion  - Legs dependent  - PT consultation       Move to Level 3 when the patient . .    -Tolerates active- assistance exercises 2 times per day    -Tolerates lower extremity exercises against footboard/Up to 20 degrees Reverse Trendelenburg    -Tolerates legs dependent /HOB 45 degrees  Level 3 :  SIT  (Rass >- 1 (eg , weak but may move arms/legs independently)   Full chair position (footboard on)   Full upright positioning allows for diaphragmatic excursion and lung expansion   Sitting with legs in a dependent position facilitates gas exchange  Additional activities to be implemented  - Maintain HOB >= 30 degrees  - Q 2 hour turning (assisted)  - Active range of motion  PT/OT actively involved  - Encourage activities of daily living  - Dangling, if patient can move arm against gravity  Move to Level 4 when the patient . .  - Tolerates increasing active exercise in bed  - Actively assists with every- 2- hour turning or turns independently  - Tolerates full chair position 3 times/day  Level 4:  Stand ( RASS >0 (eg, weak but may tolerate increased activity)      Stand Attempts   Full chair position (footboard off/feet on the floor)   Consider using a sit-to-stand lift   Pivot to chair, it tolerates partial weight bearing  Additional activities to be implemented  - Maintain head of bed >= 30 degrees  - Q 2 hr turning (self/assisted)  - Active range of motion  - Encourage activities of daily living  - PT/OT actively involved      Move to Level 5 when the patient .  - Can successfully comply with all activities  - Tolerates trial periods of full chair position (footboard off/feet on floor) 3 times per day  - Tolerates partial weight-bearing stand and pivots to chair    Level 5 :  Move  (RASS > 0    (eg, weak but may tolerate increased activity)   Achieve out of bed   Utilize mobile floor life to ambulate to bedside chair  Additional activities to be implemented  - Maintain HOB > = 30 degrees  - Q 2 hour turning (self/assisted)  - Active range of motion  - Patient stands/bears weight > 1 minute  - Patient marches in place  - PT/OT actively involved    Patient continues to ambulate progressively longer distances as tolerated until they consistently participate and move independently.         E Approved by [address] 2-19-09   N [facility] Clinical Guidelines             TANJA GARAY [facility] Flowsheet Content Variables to select when addressing section Comments   TANJA Initiated  Yes/No  RN to address minimum q 24 hours (day shift)   Target RASS  0 = alert and oriented   -1 = drowsy   -2 = light sedation   -3= moderate sedation   -4= deep sedation Target on standard ventilator setting should be -2; -4 with oscillator   CAM -ICU  Positive   Negative   Unable to assess Delirium assessment   SAT Safety Screen Passed  Yes   No Select yes if proceeding on to the sedation vacation (reduction of continuous sedative drip by directed by MD)  Select no if your patient has any of the below reasons for not proceeding on to the daily awakening sedation vacation trial   SAT Screen for Failure  Active seizures   Acute delirium tremors   Agitation that threatens accidental line/tube removal   On paralytics   MI (24-48hr)   Abnormal ICP   Open abdomen Select one of the options when the patient will not undergo the sedation vacation  ALSO MUST OBTAIN AN ORDER FOR no [address] written under nursing miscellaneous for now by either the NP or Intensivist   Daily sedation Vacation/assessment of   Yes   No   Not applicable If yes, MUST see the reduction in sedation on the [facility] and please place in the comment section of the sedative sedation vacation started   SBT Safety Screen Passed  Yes   No Select Yes if the patient has none of the below listed reasons for not proceeding on to the SBT following reduction of sedation   SBT Screen Reason for Failure  Agitation   O2 Sat < or = 88%   FIO2 > 50%   PEEP >7   MI   Vasopressor Use   Bilevel setting on Vent   Oscillator in use   Increased resp effort Select reason as appropriate for NOT proceeding on to the SBT                                               Wake Up and Breathe Protocol      05/2013

## 2021-07-09 NOTE — PROGRESS NOTES
Comprehensive Nutrition Assessment    Type and Reason for Visit: Reassess  Tube Feeding Management (Pulmonary)    Nutrition Recommendations/Plan:   · Enteral Nutrition:   · Continue Nepro via OGT at goal rate of 40ml/hour. · Continue water flush 160ml Q4H. · Continue Prosource, 2 packets once daily at 0900 with 30ml free water before and after. · IVF:  · D/C per discussion with Dr. Javi Whitaker via Perfect[employer]  · Vitamin and Mineral Supplement Therapy:  · Electrolyte management replacement protocol active. · Labs:  · BMP daily, Mg and Phos MWF. Malnutrition Assessment:  Malnutrition Status: Insufficient data (wt stable; unable to assess po intake PTA)    Nutrition Assessment:   Nutrition History: Pt well known to nutrition services from previous admissions. Last seen by [facility] on 6/26. During that time pt reported eating well PTA. She was NPO d/t concerns for vocal cord paralysis. SLP advanced diet to regular with thin liquids on 6/28. Pt previously had a PEG, but it has since been removed. Pt has been wt stable since March 2021 per chart review. Nutrition Background: Pt with PMH notable for COVID 12/2020, DM II, CAD, HLD, HTN, CKD III, GERD, CHF, and osteopenia. Pt admitted for acute respiratory failure and intubated 7/6. Daily Update:  Pt seen with TF off for MRI at time of visit. RN reports pt is tolerating well at goal rate. PRN Miralax given today since no BM yet. Abdominal Status (last documented): Active  bowel sounds. Last BM  (unknown).   Pertinent Medications: Lantus, SSI, Protonix, Miralax PRN (given 7/9),   Drips: Fentanyl (off)  Pertinent Labs:     Lab Results   Component Value Date/Time     07/09/2021 03:14 AM    K 4.4 07/09/2021 03:14 AM     (H) 07/09/2021 03:14 AM    CO2 22 07/09/2021 03:14 AM    AGAP 6 (L) 07/09/2021 03:14 AM     (H) 07/09/2021 03:14 AM    BUN 39 (H) 07/09/2021 03:14 AM    CREA 1.42 (H) 07/09/2021 03:14 AM    GFRAA 45 (L) 07/09/2021 03:14 AM    GFRNA 38 (L) 07/09/2021 03:14 AM     Sodium trending down since TF started 7/7    Nutrition Related Findings:   (7/7) intubated with OGT in place, starting TF; (7/9) TF at goal rate      Current Nutrition Therapies:  DIET NPO  ADULT TUBE FEEDING Orogastric; Renal; Delivery Method: Continuous; Continuous Initial Rate (mL/hr): 10; Continuous Advance Tube Feeding: Yes; Advancement Volume (mL/hr): 10; Advancement Frequency: Q 6 hours; Continuous Goal Rate (mL/hr): 40; Water. .. Current Tube Feeding (TF) Orders:   · Feeding Route: Orogastric  · Formula: Renal  · Schedule:Continuous    · Regimen: 40ml/hr  · Additives/Modulars: Protein (2 packets @ 0900)  · Water Flushes: 160ml Q4H  · Current TF & Flush Orders Provides: at goal  · Goal TF & Flush Orders Provides: 1664 kcal (100% estimated calorie needs), 93 grams protein (100% estimated protein needs) and 1660ml free fluid (~1ml/kcal) calculations based on 22 hours infusion    Current Intake:   Average Meal Intake: NPO Average Supplement Intake: NPO      Anthropometric Measures:  Height: 5' 5\" (165.1 cm)  Current Body Wt: 76.3 kg (168 lb 3.4 oz) (7/8), Weight source: Not specified  BMI: 28, Overweight (BMI 25.0-29. 9)  Admission Body Weight: 150 lb 9.2 oz (bed scale; 7/6)  Ideal Body Weight (lbs) (Calculated): 125 lbs (57 kg), 120.5 %  Usual Body Wt: 68 kg (150 lb) (per chart review), Percent weight change: 0.4          Edema: Facial: No Edema (7/8/2021  7:01 AM)  Generalized: No Edema (7/8/2021  7:15 PM)  LLE: 1+;2+;Non-pitting (7/9/2021  7:21 AM)  LUE: 1+;2+;Non-pitting (7/9/2021  7:21 AM)  RLE: 1+;2+;Non-pitting (7/9/2021  7:21 AM)  RUE: 1+;2+;Non-pitting (7/9/2021  7:21 AM)     Estimated Daily Nutrient Needs:  Energy (kcal/day): 5677-2897 (Kcal/kg (20-25), Weight Used: Admission (68.3kg))  Protein (g/day):  (1.2-2gm/kg) Weight Used: (Admission (68.3kg))  Fluid (ml/day):   (1 ml/kcal)    Nutrition Diagnosis:   · Inadequate oral intake related to impaired respiratory function as evidenced by intubation, NPO or clear liquid status due to medical condition    Nutrition Interventions:   Food and/or Nutrient Delivery: Continue NPO, Continue tube feeding     Coordination of Nutrition Care: Continue to monitor while inpatient  Plan of Care discussed with Lindsay Moritz, RN and Dr. Mic Camp via PerfectServe    Goals:   Previous Goal Met: Goal(s) achieved  Active Goal: Maintain estimated nutrition needs via TF until able to transition to po    Nutrition Monitoring and Evaluation:      Food/Nutrient Intake Outcomes: Enteral nutrition intake/tolerance  Physical Signs/Symptoms Outcomes: Biochemical data, GI status    Discharge Planning:     Too soon to determine    Electronically signed by Marianela Castelan MS, RDN, LD 7/9/2021 at 11:48 AM  Contact: [phone]

## 2021-07-09 NOTE — PROGRESS NOTES
Bedside shift change report given to Nir (oncoming nurse) by Enrico Red (offgoing nurse). Report included the following information SBAR, Procedure Summary, Intake/Output, MAR and Cardiac Rhythm Sinus Jose Mathis.

## 2021-07-09 NOTE — PROGRESS NOTES
Ventilator check complete; patient has a #7. 0 ET tube secured at the 22 at the lip. Patient is not sedated. Patient is able to follow commands. Breath sounds are coarse. Trachea is midline, Negative for subcutaneous air, and chest excursion is symmetric. Patient is also Negative for cyanosis and is Negative for pitting edema. All alarms are set and audible. Resuscitation bag is at the head of the bed.       Ventilator Settings  Mode FIO2 Rate Tidal Volume Pressure PEEP I:E Ratio   Pressure support  30 %     10 cm H2O  8 cm H20  1:2.70      Peak airway pressure: 24 cm H2O   Minute ventilation: 9.13 l/min         Vida Layne RT

## 2021-07-09 NOTE — PROGRESS NOTES
Ventilator check complete; patient has a #7. 0 ET tube secured at the 21 at the lip. Patient is sedated. Patient is not able to follow commands. Breath sounds are coarse and diminished. Trachea is midline, Negative for subcutaneous air, and chest excursion is symmetric. Patient is also Negative for cyanosis and is Negative for pitting edema. All alarms are set and audible. Resuscitation bag is at the head of the bed.       Ventilator Settings  Mode FIO2 Rate Tidal Volume Pressure PEEP I:E Ratio   VC+  30 %   18 450 ml     8 cm H20  1:2.7      Peak airway pressure: 26 cm H2O   Minute ventilation: 8.28 l/min       Marica Mortimer, RT

## 2021-07-09 NOTE — PROGRESS NOTES
Physician Progress Note      PATIENT:               Rosa Dale  CSN #:                  [id number]  :                       1940  ADMIT DATE:       2021 8:43 PM  [address] [place] DATE:  RESPONDING  PROVIDER #:        Dallas Tejada MD          QUERY TEXT:    Pt admitted with Acute respiratory failure with hypoxia   Noted documentation of Severe Sepsis  by the ER MD If possible, please document in progress notes and discharge summary:    The medical record reflects the following:  Risk Factors: UTI, CXR with Lung pneumonitis  Clinical Indicators: WBC 17.5, temp 94.0, Hypotension 77/47, Acute Hypoxic Respiratory failure on vent, UTI  Treatment: Cefepime IV, IVF, Vent        Thank you. Sonya Cantu RN CDI, CCS  My office phone [phone]  Options provided:  -- Sepsis confirmed present on admission  -- Sepsis confirmed not present on admission  -- Sepsis ruled out  -- Other - I will add my own diagnosis  -- Disagree - Not applicable / Not valid  -- Disagree - Clinically unable to determine / Unknown  -- Refer to Clinical Documentation Reviewer    PROVIDER RESPONSE TEXT:    The diagnosis of Sepsis was confirmed as present on admission. Query created by: Jessica Quinn on 2021 2:17 PM      QUERY TEXT:    Patient admitted with Acute Hypoxic Respiratory failure, noted to have Pneumonitis on Chest X-Ray. If possible, please document in progress notes and discharge summary if you are evaluating and/or treating any of the following: The medical record reflects the following:  Risk Factors: Recent Hospital 21  Clinical Indicators: Acute Hypoxic Respiratory failure with O 2 sat in 50% on vent, temp 94.0, WBC 17.5, Improved interstitial lung edema or pneumonitis, with mild residual.    Treatment: Cefepime IV, Vent        Thank you.   Sonya Cantu RN CDI, CCS  My office phone [phone]  Options provided:  -- Pneumonitis  -- Pneumonitis not clinically significant  -- Other - I will add my own diagnosis  -- Disagree - Not applicable / Not valid  -- Disagree - Clinically unable to determine / Unknown  -- Refer to Clinical Documentation Reviewer    PROVIDER RESPONSE TEXT:    Has pneumonia likely aspiration    Query created by: Nancy Montoya on 7/7/2021 2:28 PM      Electronically signed by:  Laura Oliveira MD 7/9/2021 2:31 PM

## 2021-07-09 NOTE — PROGRESS NOTES
Problem: Non-Violent Restraints  Goal: Removal from restraints as soon as assessed to be safe  Outcome: Progressing Towards Goal  Goal: No harm/injury to patient while restraints in use  Outcome: Progressing Towards Goal  Goal: Patient's dignity will be maintained  Outcome: Progressing Towards Goal  Goal: Patient Interventions  Outcome: Progressing Towards Goal     Problem: Ventilator Management  Goal: *Adequate oxygenation and ventilation  Outcome: Progressing Towards Goal  Goal: *Patient maintains clear airway/free of aspiration  Outcome: Progressing Towards Goal  Goal: *Absence of infection signs and symptoms  Outcome: Progressing Towards Goal  Goal: *Normal spontaneous ventilation  Outcome: Progressing Towards Goal     Problem: Patient Education: Go to Patient Education Activity  Goal: Patient/Family Education  Outcome: Progressing Towards Goal     Problem: Delirium Treatment  Goal: *Level of consciousness restored to baseline  Outcome: Progressing Towards Goal  Goal: *Level of environmental perceptions restored to baseline  Outcome: Progressing Towards Goal  Goal: *Sensory perception restored to baseline  Outcome: Progressing Towards Goal  Goal: *Emotional stability restored to baseline  Outcome: Progressing Towards Goal  Goal: *Functional assessment restored to baseline  Outcome: Progressing Towards Goal  Goal: *Absence of falls  Outcome: Progressing Towards Goal  Goal: *Will remain free of delirium, CAM Score negative  Outcome: Progressing Towards Goal  Goal: *Cognitive status will be restored to baseline  Outcome: Progressing Towards Goal  Goal: Interventions  Outcome: Progressing Towards Goal     Problem: Patient Education: Go to Patient Education Activity  Goal: Patient/Family Education  Outcome: Progressing Towards Goal     Problem: Falls - Risk of  Goal: *Absence of Falls  Description: Document Mahsa Causey Fall Risk and appropriate interventions in the flowsheet.   Outcome: Progressing Towards Goal  Note: Fall Risk Interventions:            Medication Interventions: Bed/chair exit alarm, Evaluate medications/consider consulting pharmacy    Elimination Interventions: Bed/chair exit alarm, Toileting schedule/hourly rounds              Problem: Patient Education: Go to Patient Education Activity  Goal: Patient/Family Education  Outcome: Progressing Towards Goal     Problem: Pressure Injury - Risk of  Goal: *Prevention of pressure injury  Description: Document Ashu Scale and appropriate interventions in the flowsheet. Outcome: Progressing Towards Goal  Note: Pressure Injury Interventions:  Sensory Interventions: Assess changes in LOC, Assess need for specialty bed, Avoid rigorous massage over bony prominences, Check visual cues for pain, Keep linens dry and wrinkle-free, Minimize linen layers, Monitor skin under medical devices, Turn and reposition approx. every two hours (pillows and wedges if needed)         Activity Interventions: Assess need for specialty bed    Mobility Interventions: Assess need for specialty bed, HOB 30 degrees or less, Pressure redistribution bed/mattress (bed type), Turn and reposition approx.  every two hours(pillow and wedges)    Nutrition Interventions: Document food/fluid/supplement intake    Friction and Shear Interventions: HOB 30 degrees or less, Foam dressings/transparent film/skin sealants, Minimize layers, Transferring/repositioning devices                Problem: Patient Education: Go to Patient Education Activity  Goal: Patient/Family Education  Outcome: Progressing Towards Goal

## 2021-07-09 NOTE — PROGRESS NOTES
Bedside and Verbal shift change report given to [id number] RN/Oscar RN (oncoming nurse) by Anshu Rodriguez RN/Sobia RN (offgoing nurse). Report included the following information SBAR, Kardex, ED Summary, Intake/Output, MAR, Accordion, Recent Results, Cardiac Rhythm SB and Alarm Parameters  .      Dual gtt rate verify: fentanyl

## 2021-07-09 NOTE — PROGRESS NOTES
At 1030 pt transported to MRI, 1135 pt transported back to CCU and placed on previous vent settings.

## 2021-07-09 NOTE — PROGRESS NOTES
ICU Pulmonary Daily Progress Note      Bridger Gonzales    7/9/2021     Patient is a 78 y.o.  female presents with acute respiratory failure,Patient is well known to us, she had prolong hospital stay due to COVID-19 respiratory failure in 12/2020 -1/2021, had trach, was on HD due to acute renal failure, also had cardiac arrest, she eventually went to [facility] and to [facility] afterwards. 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 After her recent hospital stay in 6/2021. Her daughter is here and helps with the history. Today she as having trouble breathing and when EMS arrived to [facility] patient was in distress, sat were in 50's and she was intubated. She is currently sedated,on vent.,she is hypothermic also -94 and also has elevated WBC. Patient with seziures on 7/7 and w/u in progress    Date of Admission:  7/6/2021    The patient's chart is reviewed and the patient is discussed with the staff. Subjective:     Patient in bed and on Vent. Trying PS and tolerating. Not following commands. tolerating Tube feeding. Still awaiting MRI. Not sedated. Off Fenatyl currently BP has been elevated    Review of Systems  Review of systems not obtained due to patient factors.     Allergies   Allergen Reactions    Sulfa (Sulfonamide Antibiotics) Swelling       Current Facility-Administered Medications   Medication Dose Route Frequency    insulin glargine (LANTUS) injection 10 Units  10 Units SubCUTAneous DAILY    dexmedeTOMidine in 0.9 % NaCl (PRECEDEX) 400 mcg/100 mL (4 mcg/mL) infusion soln  0.1-1.5 mcg/kg/hr IntraVENous TITRATE    alcohol 62% (NOZIN) nasal  1 Ampule  1 Ampule Topical Q12H    LORazepam (ATIVAN) injection 1 mg  1 mg IntraVENous Q6H PRN    cefepime (MAXIPIME) 1 g in 0.9% sodium chloride (MBP/ADV) 50 mL MBP  1 g IntraVENous Q24H    insulin regular (NOVOLIN R, HUMULIN R) injection   SubCUTAneous Q6H    pantoprazole (PROTONIX) 40 mg in 0.9% sodium chloride 10 mL injection  40 mg IntraVENous DAILY    linezolid in dextrose 5% (ZYVOX) IVPB premix in D5W 600 mg  600 mg IntraVENous Q12H    0.9% sodium chloride infusion 250 mL  250 mL IntraVENous PRN    levETIRAcetam (KEPPRA) 500 mg in 0.9% sodium chloride (MBP/ADV) 100 mL MBP  500 mg IntraVENous Q12H    NUTRITIONAL SUPPORT ELECTROLYTE PRN ORDERS   Does Not Apply PRN    dextrose 40% (GLUTOSE) oral gel 1 Tube  15 g Oral PRN    glucagon (GLUCAGEN) injection 1 mg  1 mg IntraMUSCular PRN    dextrose (D50W) injection syrg 12.5-25 g  25-50 mL IntraVENous PRN    propofoL (DIPRIVAN) 10 mg/mL injection  0-50 mcg/kg/min IntraVENous TITRATE    fentaNYL in normal saline (pf) 25 mcg/mL infusion  0-200 mcg/hr IntraVENous TITRATE    0.9% sodium chloride infusion  75 mL/hr IntraVENous CONTINUOUS           Objective:     Vitals:    07/09/21 0720 07/09/21 0731 07/09/21 0746 07/09/21 0748   BP: (!) 188/95 (!) 174/92 (!) 174/93    Pulse: 83 81 79 74   Resp: 21 19 11 16   Temp: 98.2 °F (36.8 °C) 98.2 °F (36.8 °C) 98.2 °F (36.8 °C)    SpO2: 99% 99% 100% 100%   Weight:       Height:         Ventilator Settings  Mode FIO2 Rate Tidal Volume Pressure PEEP   Pressure support  29 %    450 ml  10 cm H2O  8 cm H20      Peak airway pressure: 24 cm H2O   Minute ventilation: 9.13 l/min     07/07 1901 - 07/09 0700  In: 5691.2 [I.V.:2678.2]  Out: 2420 [Urine:2420]      PHYSICAL EXAM     Constitutional:  the patient is well developed and not in distress  EENMT:  Sclera clear, pupils equal, oral mucosa moist, tongue is ?larger today  Respiratory: clear b/l  Cardiovascular:  RRR without M,G,R  Gastrointestinal: soft and non-tender; with positive bowel sounds. Musculoskeletal: warm without cyanosis. There is no lower extremity edema. Skin:  no jaundice or rashes, no wounds   Neurologic: noted occasional eye fluttering, no seizures like yesterday. Not following commands. Breathing above vent.  No response to pain but then moving spontaneously. Plantar absent  Psychiatric:  Mildly agitated    CXR: 7/9 more congestion/infiltrates in left > right chest. ETT noted        on 7/7 increased congestion with ETT noted low        7/6 -- less congestion since intubated              Recent Labs     07/09/21 0314 07/08/21 0326 07/07/21  1655 07/07/21  1052 07/07/21  0455 07/07/21 0455 07/06/21 2137 07/06/21 2137   WBC 9.8 12.5*  --  14.2*  --  14.9*   < > 17.5*   HGB 8.1* 7.4* 7.0* 6.4*   < > 6.3*   < > 7.0*   HCT 25.8* 22.3* 20.9* 19.9*   < > 19.3*   < > 22.3*    146*  --  151  --  141*   < > 148*   INR  --   --   --   --   --   --   --  0.9    < > = values in this interval not displayed. Recent Labs     07/09/21 0314 07/08/21  1437 07/08/21 0326 07/07/21  1052 07/06/21 2137 07/06/21 2137     --  145 140   < > 143   K 4.4  --  4.5 5.2*   < > 4.7   *  --  116* 111*   < > 111*   *  --  122* 298*   < > 283*   CO2 22  --  20* 21   < > 25   BUN 39*  --  48* 52*   < > 53*   CREA 1.42*  --  1.63* 1.65*   < > 1.64*   MG 2.2  --  2.3  --   --  2.9*   PHOS 3.3 4.3* 1.7*  --   --   --    CA 7.9*  --  7.6* 7.1*   < > 7.3*   ALB  --   --   --  2.5*  --  2.5*   TBILI  --   --   --  0.1*  --  0.2   ALT  --   --   --  39  --  43    < > = values in this interval not displayed.      Recent Labs     07/09/21 0419 07/08/21 0417 07/06/21  2105   PHI 7.32* 7.46* 7.26*   PCO2I 41.5 26.7* 57.1*   PO2I 98 111* 561*   HCO3I 21.4* 18.8* 25.8     Recent Labs     07/06/21  2333 07/06/21  2137   LAC 2.7* 1.9     UA positive  procal <0.05    Cultures  U/C - 7/6 with GNR  B/C 7/6 - NG D3  B/C x 2 7/7 pending  R/C 7/8 with GNR    Assessment:  (Medical Decision Making)     Hospital Problems  Date Reviewed: 6/30/2021        Codes Class Noted POA    Seizure ([facility] ICD-10-CM: R56.9  ICD-9-CM: 780.39  7/7/2021 Unknown        Acute respiratory failure ([facility] ICD-10-CM: J96.00  ICD-9-CM: 518.81  7/6/2021 Yes        TANISHA (obstructive sleep apnea) (Chronic) ICD-10-CM: G47.33  ICD-9-CM: 327.23  6/28/2021 Yes        Severe sepsis ([facility] ICD-10-CM: A41.9, R65.20  ICD-9-CM: 038.9, 995.92  8/8/2019 Unknown        Essential hypertension (Chronic) ICD-10-CM: I10  ICD-9-CM: 401.9  6/15/2015 Yes        Chronic kidney disease, stage III (moderate) (HCC) (Chronic) ICD-10-CM: N18.30  ICD-9-CM: 585.3  6/15/2015 Yes        Gastroesophageal reflux disease without esophagitis (Chronic) ICD-10-CM: K21.9  ICD-9-CM: 530.81  6/15/2015 Yes        * (Principal) Diabetes mellitus with hyperosmolarity without hyperglycemic hyperosmolar nonketotic coma ([facility] (Chronic) ICD-10-CM: E11.00  ICD-9-CM: 250.20  11/24/2014 Yes            Patient with DM/HTN/CKD/TANISHA/prior VC paralysis with trach/ s/p trach and was in rehab and came in with respiratory distress and unresponsive. Now with seizures. Still awaiting MRI with negative CT head. . cxr now with pneumonia likely aspiration pneumonia   Plan:  (Medical Decision Making)     --MRI STILL. pending and occasionally with some rhythmic eye movements. No seizures on last EEG. On keepra and neurology is following  --on Cefepime /Linezolid D3 - now with R/C and U/C with GNR. Will stop linezolid. F/u cultures. CXR worse but likely aspirated when intubated  --continue vent support and down to 30% and on PS trials  --sedation holiday and off fentanyl. Continue restraints since trying to sit up  - ISS and noted at 150's on lantus  --hg stable in 8 range. Patient did get 1 unit of blood this admission. Stool occult pending. No BM. Will add miralax. On PPI  --BP elevated and will restart lopressor -- was on at home and use 12.5 BID for now  - GI/DVT prophylaxis -- continue protonix/ on SCD for now  --continue remaining treatment. --full code per patient's wishes -- she recently changed from DNR about 2 weeks prior to this admission per daughter, Michelle West. Condition is critical  Time spent was 30 minutes.      Will speak with Michelle Jenna later today, I did give her update yesterday. Yusra Mcdaniel ([phone]). More than 50% of the time documented was spent in face-to-face contact with the patient and in the care of the patient on the floor/unit where the patient is located.     Debora Reardon MD

## 2021-07-09 NOTE — PROGRESS NOTES
Chart reviewed and pt discussed in am IDR. Remains CCU, intubated/vent -30% fio2. Off gtts. No following commands per MD notes. TF. Admitted from [address] [place] [address]. CM following for d/c needs/POC when stable. LOS 3 days.

## 2021-07-09 NOTE — PROGRESS NOTES
Neurology Daily Progress Note     Assessment:     78year old female, known to neurology, with history of respiratory failure 2/2 COVID-19, cardiac arrest, and possible hypoxic-ischemic encephalopathy. She was readmitted for hypoxia. We are asked to see for possible seizure. EEG was slow, no seizures or epileptiform discharges. Will obtain MRI. Patient was started on cefepime today, which can lower seizure threshold. Plan:     Patient was started on cefepime, which can lower seizure threshold and cause/worsen encephalopathy. Recommend alternative agent if possible     Continue Keppra     MRI of brain. We will be able to review once it is obtained    Subjective: Interval history:    No clinical change. Resists forced eye opening. Blepharospasms bilaterally. MRI brain pending. History:    Nita Rodriguez is a 78 y.o. female who is being seen for seizure like activity. Unable to obtain ROS due to AMS.       Objective:     Vitals:    07/09/21 0846 07/09/21 0901 07/09/21 0917 07/09/21 0951   BP: (!) 169/84 (!) 173/87 (!) 180/93 (!) 181/88   Pulse: 84 80 92 93   Resp: 17 14 18    Temp: 98.2 °F (36.8 °C) 98.2 °F (36.8 °C) 98.4 °F (36.9 °C)    SpO2: 99% 99% 100%    Weight:       Height:              Current Facility-Administered Medications:     polyethylene glycol (MIRALAX) packet 17 g, 17 g, Per G Tube, DAILY PRN, Tres Belcher MD, 17 g at 07/09/21 0952    metoprolol tartrate (LOPRESSOR) tablet 12.5 mg, 12.5 mg, Per G Tube, BID, Tres Belcher MD, 12.5 mg at 07/09/21 0951    insulin glargine (LANTUS) injection 10 Units, 10 Units, SubCUTAneous, DAILY, Arianna Arredondo MD, 10 Units at 07/09/21 0826    dexmedeTOMidine in 0.9 % NaCl (PRECEDEX) 400 mcg/100 mL (4 mcg/mL) infusion soln, 0.1-1.5 mcg/kg/hr, IntraVENous, TITRATE, Betty Belcher MD, Stopped at 07/08/21 1720    alcohol 62% (NOZIN) nasal  1 Ampule, 1 Ampule, Topical, Q12H, Betty Belcher MD, 1 Ampule at 07/09/21 5593    LORazepam (ATIVAN) injection 1 mg, 1 mg, IntraVENous, Q6H PRN, Verdis Essex, MD, 1 mg at 07/08/21 2338    cefepime (MAXIPIME) 1 g in 0.9% sodium chloride (MBP/ADV) 50 mL MBP, 1 g, IntraVENous, Q24H, Verdis Essex, MD, Last Rate: 100 mL/hr at 07/08/21 2113, 1 g at 07/08/21 2113    insulin regular ([place], HUMULIN R) injection, , SubCUTAneous, Q6H, Verdis Essex, MD, 2 Units at 07/09/21 0557    pantoprazole (PROTONIX) 40 mg in 0.9% sodium chloride 10 mL injection, 40 mg, IntraVENous, DAILY, Verdis Essex, MD, 40 mg at 07/09/21 0804    0.9% sodium chloride infusion 250 mL, 250 mL, IntraVENous, PRN, Verdis Essex, MD    levETIRAcetam (KEPPRA) 500 mg in 0.9% sodium chloride (MBP/ADV) 100 mL MBP, 500 mg, IntraVENous, Q12H, Tres Belcher MD, 500 mg at 07/08/21 2338    NUTRITIONAL SUPPORT ELECTROLYTE PRN ORDERS, , Does Not Apply, PRN, Sofi Belcher MD    dextrose 40% (GLUTOSE) oral gel 1 Tube, 15 g, Oral, PRN, Verdis Essex, MD    glucagon (GLUCAGEN) injection 1 mg, 1 mg, IntraMUSCular, PRN, Verdis Essex, MD    dextrose (D50W) injection syrg 12.5-25 g, 25-50 mL, IntraVENous, PRN, Verdis Essex, MD, 25 g at 07/07/21 2339    propofoL (DIPRIVAN) 10 mg/mL injection, 0-50 mcg/kg/min, IntraVENous, TITRATE, Verdis Essex, MD, Stopped at 07/07/21 0844    fentaNYL in normal saline (pf) 25 mcg/mL infusion, 0-200 mcg/hr, IntraVENous, TITRATE, Kin Walter DO, Stopped at 07/09/21 0749    0.9% sodium chloride infusion, 75 mL/hr, IntraVENous, CONTINUOUS, Verdis Essex, MD, Last Rate: 75 mL/hr at 07/08/21 1719, 75 mL/hr at 07/08/21 1719    Recent Results (from the past 12 hour(s))   GLUCOSE, POC    Collection Time: 07/09/21 12:24 AM   Result Value Ref Range    Glucose (POC) 267 (H) 65 - 100 mg/dL    Performed by Antoinette    MAGNESIUM    Collection Time: 07/09/21  3:14 AM   Result Value Ref Range    Magnesium 2.2 1.8 - 2.4 mg/dL   PHOSPHORUS    Collection Time: 07/09/21  3:14 AM Result Value Ref Range    Phosphorus 3.3 2.3 - 3.7 MG/DL   METABOLIC PANEL, BASIC    Collection Time: 07/09/21  3:14 AM   Result Value Ref Range    Sodium 144 136 - 145 mmol/L    Potassium 4.4 3.5 - 5.1 mmol/L    Chloride 116 (H) 98 - 107 mmol/L    CO2 22 21 - 32 mmol/L    Anion gap 6 (L) 7 - 16 mmol/L    Glucose 234 (H) 65 - 100 mg/dL    BUN 39 (H) 8 - 23 MG/DL    Creatinine 1.42 (H) 0.6 - 1.0 MG/DL    GFR est AA 45 (L) >60 ml/min/1.73m2    GFR est non-AA 38 (L) >60 ml/min/1.73m2    Calcium 7.9 (L) 8.3 - 10.4 MG/DL   CBC WITH AUTOMATED DIFF    Collection Time: 07/09/21  3:14 AM   Result Value Ref Range    WBC 9.8 4.3 - 11.1 K/uL    RBC 2.60 (L) 4.05 - 5.2 M/uL    HGB 8.1 (L) 11.7 - 15.4 g/dL    HCT 25.8 (L) 35.8 - 46.3 %    MCV 99.2 (H) 79.6 - 97.8 FL    MCH 31.2 26.1 - 32.9 PG    MCHC 31.4 31.4 - 35.0 g/dL    RDW 15.9 (H) 11.9 - 14.6 %    PLATELET 964 125 - 956 K/uL    MPV 12.0 9.4 - 12.3 FL    ABSOLUTE NRBC 0.00 0.0 - 0.2 K/uL    DF AUTOMATED      NEUTROPHILS 78 43 - 78 %    LYMPHOCYTES 12 (L) 13 - 44 %    MONOCYTES 8 4.0 - 12.0 %    EOSINOPHILS 1 0.5 - 7.8 %    BASOPHILS 0 0.0 - 2.0 %    IMMATURE GRANULOCYTES 0 0.0 - 5.0 %    ABS. NEUTROPHILS 7.7 1.7 - 8.2 K/UL    ABS. LYMPHOCYTES 1.2 0.5 - 4.6 K/UL    ABS. MONOCYTES 0.8 0.1 - 1.3 K/UL    ABS. EOSINOPHILS 0.1 0.0 - 0.8 K/UL    ABS. BASOPHILS 0.0 0.0 - 0.2 K/UL    ABS. IMM. GRANS. 0.0 0.0 - 0.5 K/UL   BLOOD GAS, ARTERIAL POC    Collection Time: 07/09/21  4:19 AM   Result Value Ref Range    Device: ADULT VENT      FIO2 (POC) 30 %    pH (POC) 7.32 (L) 7.35 - 7.45      pCO2 (POC) 41.5 35 - 45 MMHG    pO2 (POC) 98 75 - 100 MMHG    HCO3 (POC) 21.4 (L) 22 - 26 MMOL/L    sO2 (POC) 97.0 95 - 98 %    Base deficit (POC) 4.5 mmol/L    Mode ASSIST CONTROL      Tidal volume 450 ml    PEEP/CPAP (POC) 8 cmH2O    Mean Airway Pressure 13 cmH2O    PIP (POC) 27      Allens test (POC) Positive      Inspiratory Time 0.90 sec    Total resp.  rate 26      Site RIGHT RADIAL Specimen type (POC) ARTERIAL      Performed by Ana Saavedra     Respiratory comment: 8.38ve    GLUCOSE, POC    Collection Time: 07/09/21  5:57 AM   Result Value Ref Range    Glucose (POC) 156 (H) 65 - 100 mg/dL    Performed by Antoinette      CT Results  (Last 48 hours)               07/07/21 1114  CT HEAD WO CONT Final result    Impression:  Stable chronic changes without acute intracranial abnormality. Narrative:  EXAMINATION: HEAD CT WITHOUT CONTRAST 7/7/2021 11:14 AM       ACCESSION NUMBER: [id number]       INDICATION: Seizures. COMPARISON: CT head, 5/5/2021       TECHNIQUE: Multiple-row detector helical CT examination of the head without   intravenous contrast.        Radiation dose reduction techniques were used for this study:  Our CT scanners   use one or all of the following: Automated exposure control, adjustment of the   mA and/or kVp according to patient's size, iterative reconstruction. FINDINGS:   No evidence of intracranial mass, hemorrhage, or acute large territorial   infarct. 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 Generalized parenchymal volume loss with commensurate   enlargement of the ventricles and sulci. The ventricles remain midline and   symmetric. Basal cisterns are patent. There is scattered deep and   periventricular white matter lucency which is nonspecific but unchanged and most   compatible with chronic microvascular ischemic changes. No extra-axial fluid   collection or mass effect. The orbital contents are within normal limits. The paranasal sinuses are clear. The mastoid air cells and middle ears are clear. No significant osseous or   extracranial soft tissue lesions. Physical Exam:  General - Chronically ill appearing female. HEENT - Trach midline  Neck - Supple without masses  Extremities - Peripheral pulses intact. No edema and no rashes. Neurological examination    Level of consciousness- Sedated. Does not follow commands. Resists forced eye opening. Brain stem reflexes  -Pupillary reflex to bright light:2 mm, equal, round, reactive  -Ciliospinal reflexes: present   -Oculovestibular and/or oculocephalic reflex response: present   -Corneal reflex: n/a   -Facial movement to painful stimulus at supraorbital nerve, temporomandibular joint: no response   -Cough/gag reflex: n/a     Motor examination  -Muscle tone:increased throughout   -Motor response to pain: no response to pain. -Muscle stretch reflexes: absent throughout   -Response to plantar stimulation: mute bilaterally           Signed By: IVONNE Chisholm     July 9, 2021    Neurology attending    Patient seen and examined  I have reviewed the MRI on the PACS system.     Agree that the microvascular disease is modest however the amount that is present in the brainstem specifically in the corin raise the issue that there may be a partial degree of locked-in phenomenology however the EEG demonstrates profound cortical suppression so it is probably a combination of factors overall very guarded neurologic prognosis in terms of return to full function potential

## 2021-07-09 NOTE — PROGRESS NOTES
Interdisciplinary team rounds were held 7/9/2021 with the following team members:Care Management, Nursing, Pharmacy, Physical Therapy, Physician, Respiratory Therapy and Clinical Coordinator and the patient. Plan of care discussed. See clinical pathway and/or care plan for interventions and desired outcomes.

## 2021-07-10 NOTE — PROGRESS NOTES
Neurology Daily Progress Note     Assessment:     Toxic metabolic encephalopathy. She is known to neurology, with history of respiratory failure 2/2 COVID-19, cardiac arrest, and possible hypoxic-ischemic encephalopathy. Readmitted for hypoxia. We are asked to see for possible seizure. EEG was slow, no seizures or epileptiform discharges. She was started on cefepime, which can lower seizure threshold. This has been discontinued. 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 UA + for Klebsiella, on IV antibiotics. No seizure activity. Continue Keppra. No additional neurological workup is needed at this time. Will sign off. Plan:     Continue IV antibiotics per primary team.     Continue Keppra     Continue to correct metabolic and infectious etiologies. Subjective: Interval history:    Remains intubated. Off sedation. Does not follow commands. Resists forced eye opening. 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 UA + for Klebsiella, on IV antibiotics. No seizure activity. Continue Keppra. History:    Ramiro Connolly is a 78 y.o. female who is being seen for seizure like activity. Unable to obtain ROS due to AMS.       Objective:     Vitals:    07/10/21 0801 07/10/21 0831 07/10/21 0835 07/10/21 0901   BP: (!) 141/65 (!) 140/62 (!) 140/62 (!) 128/58   Pulse: 78 76 79 69   Resp:  (!) 39  22   Temp:       SpO2: 100% 100%  100%   Weight:       Height:              Current Facility-Administered Medications:     insulin glargine (LANTUS) injection 14 Units, 14 Units, SubCUTAneous, DAILY, [place] MD Ita, 14 Units at 07/10/21 0843    meropenem (MERREM) 500 mg in 0.9% sodium chloride (MBP/ADV) 50 mL MBP, 0.5 g, IntraVENous, Q8H, Mary Ahuja MD, Last Rate: 100 mL/hr at 07/10/21 0830, 500 mg at 07/10/21 0830    polyethylene glycol (MIRALAX) packet 17 g, 17 g, Per G Tube, DAILY PRN, Elisa Ford MD, 17 g at 07/09/21 0968    metoprolol tartrate (LOPRESSOR) tablet 12.5 mg, 12.5 mg, Per G Tube, BID, Tres Belcher MD, 12.5 mg at 07/10/21 0835    hydrALAZINE (APRESOLINE) 20 mg/mL injection 10 mg, 10 mg, IntraVENous, Q6H PRN, Osei Belcher MD    hydrALAZINE (APRESOLINE) tablet 50 mg, 50 mg, Per G Tube, Q6H, Tres Belcher MD, 50 mg at 07/10/21 0555    alcohol 62% (NOZIN) nasal  1 Ampule, 1 Ampule, Topical, Q12H, Tres Belcher MD, 1 Ampule at 07/10/21 0840    LORazepam (ATIVAN) injection 1 mg, 1 mg, IntraVENous, Q6H PRN, Elissa Yan MD, 1 mg at 07/10/21 0310    insulin regular (Harvie Yakut, HUMULIN R) injection, , SubCUTAneous, Q6H, Elissa Yan MD, 4 Units at 07/10/21 0548    pantoprazole (PROTONIX) 40 mg in 0.9% sodium chloride 10 mL injection, 40 mg, IntraVENous, DAILY, Elissa Yan MD, 40 mg at 07/10/21 7053    0.9% sodium chloride infusion 250 mL, 250 mL, IntraVENous, PRN, Elissa Yan MD    levETIRAcetam (KEPPRA) 500 mg in 0.9% sodium chloride (MBP/ADV) 100 mL MBP, 500 mg, IntraVENous, Q12H, Tres Belcher MD, 500 mg at 07/10/21 0011    NUTRITIONAL SUPPORT ELECTROLYTE PRN ORDERS, , Does Not Apply, PRN, Osei Belcher MD    dextrose 40% (GLUTOSE) oral gel 1 Tube, 15 g, Oral, PRN, Elissa Yan MD    glucagon (GLUCAGEN) injection 1 mg, 1 mg, IntraMUSCular, PRN, Elissa Yan MD    dextrose (D50W) injection syrg 12.5-25 g, 25-50 mL, IntraVENous, PRN, Elissa Yan MD, 25 g at 07/07/21 0897    Recent Results (from the past 12 hour(s))   GLUCOSE, POC    Collection Time: 07/10/21 12:21 AM   Result Value Ref Range    Glucose (POC) 227 (H) 65 - 100 mg/dL    Performed by Antoinette    CBC WITH AUTOMATED DIFF    Collection Time: 07/10/21  3:12 AM   Result Value Ref Range    WBC 9.5 4.3 - 11.1 K/uL    RBC 2.73 (L) 4.05 - 5.2 M/uL    HGB 8.5 (L) 11.7 - 15.4 g/dL    HCT 27.1 (L) 35.8 - 46.3 %    MCV 99.3 (H) 79.6 - 97.8 FL    MCH 31.1 26.1 - 32.9 PG    MCHC 31.4 31.4 - 35.0 g/dL    RDW 15.4 (H) 11.9 - 14.6 %    PLATELET 290 493 - 222 K/uL    MPV 11.3 9.4 - 12.3 FL    ABSOLUTE NRBC 0.00 0.0 - 0.2 K/uL    DF AUTOMATED      NEUTROPHILS 77 43 - 78 %    LYMPHOCYTES 12 (L) 13 - 44 %    MONOCYTES 9 4.0 - 12.0 %    EOSINOPHILS 2 0.5 - 7.8 %    BASOPHILS 0 0.0 - 2.0 %    IMMATURE GRANULOCYTES 0 0.0 - 5.0 %    ABS. NEUTROPHILS 7.3 1.7 - 8.2 K/UL    ABS. LYMPHOCYTES 1.1 0.5 - 4.6 K/UL    ABS. MONOCYTES 0.9 0.1 - 1.3 K/UL    ABS. EOSINOPHILS 0.2 0.0 - 0.8 K/UL    ABS. BASOPHILS 0.0 0.0 - 0.2 K/UL    ABS. IMM. GRANS. 0.0 0.0 - 0.5 K/UL   METABOLIC PANEL, BASIC    Collection Time: 07/10/21  3:12 AM   Result Value Ref Range    Sodium 142 136 - 145 mmol/L    Potassium 4.1 3.5 - 5.1 mmol/L    Chloride 114 (H) 98 - 107 mmol/L    CO2 23 21 - 32 mmol/L    Anion gap 5 (L) 7 - 16 mmol/L    Glucose 215 (H) 65 - 100 mg/dL    BUN 36 (H) 8 - 23 MG/DL    Creatinine 1.33 (H) 0.6 - 1.0 MG/DL    GFR est AA 49 (L) >60 ml/min/1.73m2    GFR est non-AA 41 (L) >60 ml/min/1.73m2    Calcium 8.2 (L) 8.3 - 10.4 MG/DL   BLOOD GAS, ARTERIAL POC    Collection Time: 07/10/21  4:20 AM   Result Value Ref Range    Device: ADULT VENT      FIO2 (POC) 30 %    pH (POC) 7.34 (L) 7.35 - 7.45      pCO2 (POC) 40.4 35 - 45 MMHG    pO2 (POC) 94 75 - 100 MMHG    HCO3 (POC) 22.0 22 - 26 MMOL/L    sO2 (POC) 96.9 95 - 98 %    Base deficit (POC) 3.4 mmol/L    Mode ASSIST CONTROL      Tidal volume 450 ml    PEEP/CPAP (POC) 8 cmH2O    Mean Airway Pressure 16 cmH2O    PIP (POC) 27      Allens test (POC) Positive      Inspiratory Time 0.90 sec    Total resp.  rate 20      Site RIGHT RADIAL      Specimen type (POC) ARTERIAL      Performed by Evon Yadav     Respiratory comment: 8.89ve    GLUCOSE, POC    Collection Time: 07/10/21  5:46 AM   Result Value Ref Range    Glucose (POC) 216 (H) 65 - 100 mg/dL    Performed by Antoinette      MRI Results (most recent):  [facility]    MRI BRAIN WO CONT    Narrative  Exam: MRI BRAIN WO CONT on 7/9/2021 1:39 PM    Clinical History: The Female patient is 78years old presenting for Seizure  activity/ protocol per Dr. Alirio Butler tremors and weakness-S/P Covid with  long term ventilation-Patient inutbated. Comparison:  Head CT 7/7/2021    Technique:  Axial T2, axial FLAIR, axial diffusion-weighted,  sagittal T1 and  coronal gradient-echo scans were performed. Findings:    Study is motion degraded. Age-related cortical involutional changes are seen. . There is mild chronic  periventricular white matter disease with remote ischemic changes involving the  posterior right temporal occipital lobe with focal encephalomalacia and gliosis. There are chronic lacunae throughout the basal ganglia and thalami. No evidence  of restricted diffusion is seen to suggest acute ischemia    There are no abnormal extra-axial fluid collections. No evidence of mass or mass  effect is seen. There is no diffusion signal abnormality. Expected flow voids  are maintained in the major intracranial vessels. Right microvascular disease is suggested throughout the corin. There is no  evidence of Chiari malformation. The ventricular system and CSF containing spaces are unremarkable in appearance. Visualized extracranial soft tissues are unremarkable. The paranasal sinuses are well pneumatized and aerated. There is extensive fluid  signal throughout the mastoid air cells bilaterally. Impression  1. Age-related senescent changes with remote right posterior temporal occipital  ischemic insult as well as mild chronic microvascular disease. 2. Bilateral mastoiditis    CPT code(s) M2332030        Physical Exam:  General - Chronically ill appearing female. HEENT - Trach midline  Neck - Supple without masses  Extremities - Peripheral pulses intact. No edema and no rashes.      Neurological examination    Level of consciousness-  Does not follow commands. Resists forced eye opening. Brain stem reflexes  -Pupillary reflex to bright light:2 mm, equal, round, reactive  -Ciliospinal reflexes: present   -Oculovestibular and/or oculocephalic reflex response: present   -Corneal reflex: n/a   -Facial movement to painful stimulus at supraorbital nerve, temporomandibular joint: no response   -Cough/gag reflex: n/a     Motor examination  -Muscle tone: normal throughout   -Motor response to pain: withdraws from pain in all 4 extremities   -Muscle stretch reflexes: 1+ BUE, areflexic patellar and ankle jerks   -Response to plantar stimulation: mute bilaterally           Signed By: IVONNE Aquino     July 10, 2021    Neurology attending    Patient seen. Poor overall responsiveness but not much change since yesterday. The patient is a full code and will continue to support her EEG is not encouraging with reference to return of good brain function.     No primary neurologic interventions at this point

## 2021-07-10 NOTE — PROGRESS NOTES
Bedside and verbal shift change report received from  [facility] (offgoing nurse). Report included the following information SBAR, Kardex, Intake/Output, MAR, Recent Results, Med Rec Status and Cardiac Rhythm . NSR. Dual skin assessment completed at bedside. Patient not on any gtts at this time.

## 2021-07-10 NOTE — PROGRESS NOTES
Ventilator check complete; patient has a #7. 0 ET tube secured at the 21 at the lip. Patient is not sedated. Patient is able to follow commands. Breath sounds are diminished. Trachea is midline, Negative for subcutaneous air, and chest excursion is symmetric. Patient is also Negative for cyanosis and is Negative for pitting edema. All alarms are set and audible. Resuscitation bag is at the head of the bed. Ventilator Settings  Mode FIO2 Rate Tidal Volume Pressure PEEP I:E Ratio   ps  30 %     12 cm H2O  8 cm H20  1:2.2      Peak airway pressure: 21 cm H2O   Minute ventilation: 8.67 l/min     ABG: No results for input(s): PH, PCO2, PO2, HCO3 in the last 72 hours.       Mame Doe, RT

## 2021-07-10 NOTE — PROGRESS NOTES
Bedside and verbal shift change report received from  [facility] (offgoing nurse). Report included the following information SBAR, Kardex, Intake/Output, MAR, Accordion, Recent Results, Cardiac Rhythm NSR and Alarm Parameters .      Dual skin assessment completed at bedside: N/A (list pertinent skin assessment findings)    Dual verification of gtts completed (name of gtts verified): N/A

## 2021-07-10 NOTE — PROGRESS NOTES
Ventilator check complete; patient has a #7. 0 ET tube secured at the 21 at the lip. Patient is not sedated. Patient is not able to follow commands. Breath sounds are coarse. Trachea is midline, Negative for subcutaneous air, and chest excursion is symmetric. Patient is also Negative for cyanosis. All alarms are set and audible. Resuscitation bag is at the head of the bed.       Ventilator Settings  Mode FIO2 Rate Tidal Volume Pressure PEEP I:E Ratio   VC+  30 % 18 0.45 ml    8 cm H20  1:2.70      Peak airway pressure: 25 cm H2O   Minute ventilation: 11.4 l/min         Jackson Winston, RT

## 2021-07-11 NOTE — PROGRESS NOTES
Ventilator check complete; patient has a #7. 0 ET tube secured at the 21 at the lip. Patient is not sedated. Patient is not able to follow commands. Breath sounds are coarse. Trachea is midline, Negative for subcutaneous air, and chest excursion is symmetric. Patient is also Negative for cyanosis and is Negative for pitting edema. All alarms are set and audible. Resuscitation bag is at the head of the bed.       Ventilator Settings  Mode FIO2 Rate Tidal Volume Pressure PEEP I:E Ratio   VC+  30 % 18 450 ml   8 cm H20  1:1.8      Peak airway pressure: 30 cm H2O   Minute ventilation: 10.3 l/min       RT Alejandra

## 2021-07-11 NOTE — PROGRESS NOTES
Bedside and verbal shift change report received from  Ross (offgoing nurse). Report included the following information SBAR, Kardex, Intake/Output, MAR, Recent Results, Med Rec Status and Cardiac Rhythm NSR. Dual skin assessment completed at bedside.

## 2021-07-11 NOTE — PROGRESS NOTES
Ventilator check complete; patient has a #7. 0 ET tube secured at the 21 at the lip. Patient is sedated. Patient is able to follow commands. Breath sounds are coarse. Trachea is midline, Negative for subcutaneous air, and chest excursion is symmetric. Patient is also Negative for cyanosis and is Negative for pitting edema. All alarms are set and audible. Resuscitation bag is at the head of the bed.       Ventilator Settings  Mode FIO2 Rate Tidal Volume Pressure PEEP I:E Ratio   Pressure support  29 %      8 cm H2O  8 cm H20  1:1.8      Peak airway pressure: 17 cm H2O   Minute ventilation: 10.7 l/min           Kang Morrell, RT

## 2021-07-11 NOTE — PROGRESS NOTES
Critical Care Daily Progress Note: 7/11/2021  Admission Date: 7/6/2021     The patient's chart is reviewed and the patient is discussed with the staff. Patient is a 78 y.o.  female presents with acute respiratory failure,Patient is well known to us, she had prolong hospital stay due to COVID-19 respiratory failure in 12/2020 -1/2021, had trach, was on HD due to acute renal failure, also had cardiac arrest, she eventually went to [facility] and to [facility] afterwards. 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 After her recent hospital stay in 6/2021. Her daughter is here and helps with the history. Today she as having trouble breathing and when EMS arrived to [facility] patient was in distress, sat were in 50's and she was intubated.  She is currently sedated,on vent.,she is hypothermic also -94 and also has elevated WBC.     Patient with seziures on 7/7 and w/u in progress    Subjective:     On vent  had MRI and revealed remote infarct  Neuro following    Current Facility-Administered Medications   Medication Dose Route Frequency    insulin glargine (LANTUS) injection 14 Units  14 Units SubCUTAneous DAILY    meropenem (MERREM) 500 mg in 0.9% sodium chloride (MBP/ADV) 50 mL MBP  0.5 g IntraVENous Q8H    polyethylene glycol (MIRALAX) packet 17 g  17 g Per G Tube DAILY PRN    metoprolol tartrate (LOPRESSOR) tablet 12.5 mg  12.5 mg Per G Tube BID    hydrALAZINE (APRESOLINE) 20 mg/mL injection 10 mg  10 mg IntraVENous Q6H PRN    hydrALAZINE (APRESOLINE) tablet 50 mg  50 mg Per G Tube Q6H    alcohol 62% (NOZIN) nasal  1 Ampule  1 Ampule Topical Q12H    LORazepam (ATIVAN) injection 1 mg  1 mg IntraVENous Q6H PRN    insulin regular (NOVOLIN R, HUMULIN R) injection   SubCUTAneous Q6H    pantoprazole (PROTONIX) 40 mg in 0.9% sodium chloride 10 mL injection  40 mg IntraVENous DAILY    0.9% sodium chloride infusion 250 mL  250 mL IntraVENous PRN    levETIRAcetam (KEPPRA) 500 mg in 0.9% sodium chloride (MBP/ADV) 100 mL MBP  500 mg IntraVENous Q12H    NUTRITIONAL SUPPORT ELECTROLYTE PRN ORDERS   Does Not Apply PRN    dextrose 40% (GLUTOSE) oral gel 1 Tube  15 g Oral PRN    glucagon (GLUCAGEN) injection 1 mg  1 mg IntraMUSCular PRN    dextrose (D50W) injection syrg 12.5-25 g  25-50 mL IntraVENous PRN       Review of Systems   Unobtainable due to patient status. Objective:     Vitals:    07/11/21 0516 07/11/21 0616 07/11/21 0743 07/11/21 0836   BP: (!) 162/77 (!) 152/74  (!) 117/55   Pulse: (!) 105 90 (!) 102 91   Resp: (!) 34 22 28    Temp:       SpO2: 98% 99% 100%    Weight:  175 lb 7.8 oz (79.6 kg)     Height:             Intake/Output Summary (Last 24 hours) at 7/11/2021 0852  Last data filed at 7/11/2021 0606  Gross per 24 hour   Intake 1703 ml   Output 1625 ml   Net 78 ml         Physical Exam:          Constitutional:  intubated and mechanically ventilated. EENMT:  Sclera clear, pupils equal, oral mucosa moist  Respiratory: rhonchi   Cardiovascular:  RRR with no M,G,R;  Gastrointestinal:  soft with no tenderness; positive bowel sounds present  Musculoskeletal:  warm with no cyanosis, no lower extremity edema  Skin:  no jaundice or ecchymosis  Neurologic: no gross neuro deficits     Psychiatric: on vent      LINES:    ETT  [place]  NG  PIV    DRIPS:    TF    CXR:         Portable view of the chest      COMPARISON: July 9, 2021     CLINICAL HISTORY: Respiratory failure.     FINDINGS:     Stable postsurgical changes of sternotomy. Endotracheal tube and enteric tube  are stable. Heart is enlarged. Bilateral interstitial densities, likely  pulmonary edema There may be small pleural effusions. No pneumothorax.        IMPRESSION     1. ET tube tip is in good position.     2. Mild pulmonary edema and suggestion of small effusions. No significant change  in the appearance of the lungs.   UA positive  procal <0.05     Cultures  U/C - 7/6 with GNR  B/C 7/6 - NG D3  B/C x 2 7/7 pending  R/C 7/8 with GNR    Ventilator Settings  Mode FIO2 Rate Tidal Volume Pressure PEEP   Pressure support  29 %    450 ml  12 cm H2O  8 cm H20      Peak airway pressure: 21 cm H2O   Minute ventilation: 12.9 l/min     ABG:   Recent Labs     07/11/21  0358 07/10/21  0420 07/09/21  0419   PHI 7.34* 7.34* 7.32*   PCO2I 42.4 40.4 41.5   PO2I 98 94 98   HCO3I 23.1 22.0 21.4*       LAB  Recent Labs     07/11/21  0558 07/11/21  0031 07/10/21  1729 07/10/21  1152 07/10/21  0546   GLUCPOC 208* 121* 161* 254* 216*     Recent Labs     07/11/21  0324 07/10/21  0312 07/09/21  0314   WBC 8.0 9.5 9.8   HGB 8.2* 8.5* 8.1*   HCT 26.3* 27.1* 25.8*    179 180       Patient with DM/HTN/CKD/TANISHA/prior VC paralysis with trach/ s/p trach and was in rehab and came in with respiratory distress and unresponsive. Now with seizures. Still awaiting MRI with negative CT head.  . cxr now with pneumonia likely aspiration pneumonia     Assessment:  (Medical Decision Making)     Hospital Problems  Date Reviewed: 6/30/2021        Codes Class Noted POA    [facility] -- likely present on admission and more noticable past few days ICD-10-CM: J69.0  ICD-9-CM: 507.0  7/9/2021 Unknown        Seizure ([facility] ICD-10-CM: R56.9  ICD-9-CM: 780.39  7/7/2021 Unknown        Acute respiratory failure ([facility] ICD-10-CM: J96.00  ICD-9-CM: 518.81  7/6/2021 Yes        TANISHA (obstructive sleep apnea) (Chronic) ICD-10-CM: G47.33  ICD-9-CM: 327.23  6/28/2021 Yes        Severe sepsis ([facility] ICD-10-CM: A41.9, R65.20  ICD-9-CM: 038.9, 995.92  8/8/2019 Unknown        Essential hypertension (Chronic) ICD-10-CM: I10  ICD-9-CM: 401.9  6/15/2015 Yes        Chronic kidney disease, stage III (moderate) (HCC) (Chronic) ICD-10-CM: N18.30  ICD-9-CM: 585.3  6/15/2015 Yes        Gastroesophageal reflux disease without esophagitis (Chronic) ICD-10-CM: K21.9  ICD-9-CM: 530.81  6/15/2015 Yes        * (Principal) Diabetes mellitus with hyperosmolarity without hyperglycemic hyperosmolar nonketotic coma (HCC) (Chronic) ICD-10-CM: E11.00  ICD-9-CM: 250.20  11/24/2014 Yes              Plan:  (Medical Decision Making)     --wean PS as able  -- continue Meropenem- growing ESBL Klebsiella in urine ,also  Another multidrug resistant    -increased lantus to 14 Units, BS still high  -IV Lasix  - GI/DVT profylaxis  -cont. TF  -full code per patient's wishes -- she recently changed from DNR about 2 weeks prior to this admission per daughter, [facility]      The patient is critically ill with respiratory failure, circulatory failure and requires high complexity decision making for assessment and support including frequent ventilator adjustment , frequent evaluation and titration of therapies , application of advanced monitoring technologies and extensive interpretation of multiple databases  Time devoted to patient care services described in this note- 15 min face to face/ 20 min total evaluation time    Cumulative time devoted to patient care services by me for day of service -35 min       More than 50% of the time documented was spent in face-to-face contact with the patient and in the care of the patient on the floor/unit where the patient is located.     Eduar Breen MD

## 2021-07-11 NOTE — PROGRESS NOTES
Bedside and verbal shift change report received from  Sheba (offgoing nurse). Report included the following information SBAR, Kardex, Intake/Output, MAR, Accordion, Recent Results, Cardiac Rhythm NSR and Alarm Parameters .      Dual skin assessment completed at bedside: N/A (list pertinent skin assessment findings)    Dual verification of gtts completed (name of gtts verified): N/A

## 2021-07-12 NOTE — PROGRESS NOTES
Ventilator check complete; patient has a #7. 0 ET tube secured at the 21 at the lip. Patient is not sedated. Patient is not able to follow commands. Breath sounds are coarse. Trachea is midline, Negative for subcutaneous air, and chest excursion is symmetric. Patient is also Negative for cyanosis and is Negative for pitting edema. All alarms are set and audible. Resuscitation bag is at the head of the bed. Ventilator Settings  Mode FIO2 Rate Tidal Volume Pressure PEEP I:E Ratio   Assist control, VC+  30 %    450 ml  14 cm H2O (increased due to RR up and Vt down.)  8 cm H20  1:2.7      Peak airway pressure: 27 cm H2O   Minute ventilation: 7.7 l/min     ABG: No results for input(s): PH, PCO2, PO2, HCO3 in the last 72 hours.       formerly [facility], RT

## 2021-07-12 NOTE — PROGRESS NOTES
A follow up visit was made to the patient. Emotional support, spiritual presence and   prayer were provided for the patient and her daughter, Ana Armstrong. Ana Armstrong shared about her mother's medical journey since December of last year.     Yana Hammer, [address] [facility], [facility]

## 2021-07-12 NOTE — PROGRESS NOTES
This is a follow-up visit to the patient, providing a spiritual presence, emotional support and prayer. The patient appears to be resting.     Zuly Palmer, [address] [facility] [place]note 2], [facility]

## 2021-07-12 NOTE — PROGRESS NOTES
Comprehensive Nutrition Assessment    Type and Reason for Visit: Reassess  Tube Feeding Management (Pulmonary)    Nutrition Recommendations/Plan:   · Enteral Nutrition:   · Continue Nepro via OGT at goal rate of 40ml/hour. · Increase water flush to 185ml Q4H. · Continue Prosource, 2 packets once daily at 0900 with 30ml free water before and after. · At goal will provide 1664 kcal (100% estimated calorie needs), 93 grams protein (100% estimated protein needs) and 1750ml free fluid (~1.1ml/kcal) calculations based on 22 hours infusion. · Vitamin and Mineral Supplement Therapy:  · Electrolyte management replacement protocol active. · Labs:  · BMP daily, Mg and Phos MWF.      Malnutrition Assessment:  Malnutrition Status: Insufficient data (wt stable; unable to assess po intake PTA)    Nutrition Assessment:   Nutrition History: Pt well known to nutrition services from previous admissions. Last seen by [facility] on 6/26. During that time pt reported eating well PTA. She was NPO d/t concerns for vocal cord paralysis. SLP advanced diet to regular with thin liquids on 6/28. Pt previously had a PEG, but it has since been removed. Pt has been wt stable since March 2021 per chart review. Nutrition Background: Pt with PMH notable for COVID 12/2020, DM II, CAD, HLD, HTN, CKD III, GERD, CHF, and osteopenia. Pt admitted for acute respiratory failure and intubated 7/6. Daily Update:  Pt off sedation and remains intubated. Daughter Evie Flores) present during time of visit. Noted MRI (7/9) showed remote right posterior temporal occipital insult and bilateral mastoiditis. Abdominal Status (last documented): Active  bowel sounds. Last BM 07/11/21.   Pertinent Medications: Lantus, SSI, Merrem  Pertinent Labs:     Lab Results   Component Value Date/Time     07/12/2021 04:12 AM    K 4.0 07/12/2021 04:12 AM     (H) 07/12/2021 04:12 AM    CO2 26 07/12/2021 04:12 AM    AGAP 5 (L) 07/12/2021 04:12 AM     (H) 07/12/2021 04:12 AM    BUN 32 (H) 07/12/2021 04:12 AM    CREA 1.18 (H) 07/12/2021 04:12 AM    GFRAA 56 (L) 07/12/2021 04:12 AM    GFRNA 46 (L) 07/12/2021 04:12 AM    CA 8.9 07/12/2021 04:12 AM     Sodium remains high normal with current water flush    Nutrition Related Findings:   (7/7) intubated with OGT in place, starting TF; (7/9) TF at goal rate      Current Nutrition Therapies:  DIET NPO  ADULT TUBE FEEDING Orogastric; Renal; Delivery Method: Continuous; Continuous Initial Rate (mL/hr): 10; Continuous Advance Tube Feeding: Yes; Advancement Volume (mL/hr): 10; Advancement Frequency: Q 6 hours; Continuous Goal Rate (mL/hr): 40; Water. .. Current Tube Feeding (TF) Orders:   · Feeding Route: Orogastric  · Formula: Renal  · Schedule:Continuous    · Regimen: 40ml/hr  · Additives/Modulars: Protein (2 packets @ 0900)  · Water Flushes: 160ml Q4H  · Current TF & Flush Orders Provides: at goal  · Goal TF & Flush Orders Provides: 1664 kcal (100% estimated calorie needs), 93 grams protein (100% estimated protein needs) and 1660ml free fluid (~1ml/kcal) calculations based on 22 hours infusion      Current Intake:   Average Meal Intake: NPO Average Supplement Intake: NPO      Anthropometric Measures:  Height: 5' 5\" (165.1 cm)  Current Body Wt: 77.6 kg (171 lb 1.2 oz) (7/12), Weight source: Bed scale  BMI: 28.5, Overweight (BMI 25.0-29. 9)  Admission Body Weight: 150 lb 9.2 oz (bed scale; 7/6)  Ideal Body Weight (lbs) (Calculated): 125 lbs (57 kg), 120.5 %  Usual Body Wt: 68 kg (150 lb) (per chart review), Percent weight change: 0.4          Edema: Genital: Non-pitting (7/11/2021  7:01 AM)  Facial: No Edema (7/11/2021  7:33 PM)  Generalized: No Edema (7/11/2021  7:33 PM)  LLE: Non-pitting (7/11/2021  7:01 AM)  LUE: Non-pitting (7/11/2021  7:01 AM)  RLE: Non-pitting (7/11/2021  7:01 AM)  RUE: Non-pitting (7/11/2021  7:01 AM)     Estimated Daily Nutrient Needs:  Energy (kcal/day): 6127-2284 (Kcal/kg (20-25), Weight Used: Admission (68.3kg))  Protein (g/day):  (1.2-2gm/kg) Weight Used: (Admission (68.3kg))  Fluid (ml/day):   (1 ml/kcal)    Nutrition Diagnosis:   · Inadequate oral intake related to impaired respiratory function as evidenced by intubation, NPO or clear liquid status due to medical condition    Nutrition Interventions:   Food and/or Nutrient Delivery: Continue NPO, Modify tube feeding     Coordination of Nutrition Care: Continue to monitor while inpatient  Plan of Care discussed with Carol Ann Garza RN    Goals:   Previous Goal Met: Goal(s) achieved  Active Goal: Maintain estimated nutrition needs via TF until able to transition to po    Nutrition Monitoring and Evaluation:      Food/Nutrient Intake Outcomes: Enteral nutrition intake/tolerance  Physical Signs/Symptoms Outcomes: Biochemical data, GI status    Discharge Planning:     Too soon to determine    Electronically signed by James Coleman MS, DIAMANTE, JULI 7/12/2021 at 1:20 PM  Contact: [phone]

## 2021-07-12 NOTE — PROGRESS NOTES
Chart reviewed and pt discussed in am IDR. Remains ICU intubated/vent. (5 days) 30% fio2. No cont gtts currently per [facility]. Not following commands. Neurology following. Change to full code per pt/family wishes at present. CM following for any assist. LTC at [facility]. LOS 5 days.

## 2021-07-12 NOTE — PROGRESS NOTES
Critical Care Daily Progress Note: 7/12/2021  Admission Date: 7/6/2021     The patient's chart is reviewed and the patient is discussed with the staff. Patient is a 78 y.o.  female presents with acute respiratory failure,Patient is well known to us, she had prolong hospital stay due to COVID-19 respiratory failure in 12/2020 -1/2021, had trach, was on HD due to acute renal failure, also had cardiac arrest, she eventually went to [facility] and to [facility] afterwards. 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 After her recent hospital stay in 6/2021. Her daughter is here and helps with the history. Today she as having trouble breathing and when EMS arrived to [facility] patient was in distress, sat were in 50's and she was intubated. She is currently sedated,on vent.,she is hypothermic also -94 and also has elevated WBC.     Patient with seziures on 7/7 and w/u in progress. had MRI and revealed remote infarct    Subjective:     On vent and not following commands. When agitated trying to wake up, but again not following commands. Did try PS but needed stimulation to start to breath.         Current Facility-Administered Medications   Medication Dose Route Frequency    risperiDONE (RisperDAL) 1 mg/mL oral solution soln 0.25 mg  0.25 mg Nasogastric BID    furosemide (LASIX) injection 40 mg  40 mg IntraVENous Q12H    LORazepam (ATIVAN) injection 0.5 mg  0.5 mg IntraVENous Q6H PRN    insulin glargine (LANTUS) injection 14 Units  14 Units SubCUTAneous DAILY    meropenem (MERREM) 500 mg in 0.9% sodium chloride (MBP/ADV) 50 mL MBP  0.5 g IntraVENous Q8H    polyethylene glycol (MIRALAX) packet 17 g  17 g Per G Tube DAILY PRN    metoprolol tartrate (LOPRESSOR) tablet 12.5 mg  12.5 mg Per G Tube BID    hydrALAZINE (APRESOLINE) 20 mg/mL injection 10 mg  10 mg IntraVENous Q6H PRN    hydrALAZINE (APRESOLINE) tablet 50 mg  50 mg Per G Tube Q6H    alcohol 62% (NOZIN) nasal  1 Ampule  1 Ampule Topical Q12H    insulin regular (NOVOLIN R, HUMULIN R) injection   SubCUTAneous Q6H    pantoprazole (PROTONIX) 40 mg in 0.9% sodium chloride 10 mL injection  40 mg IntraVENous DAILY    0.9% sodium chloride infusion 250 mL  250 mL IntraVENous PRN    levETIRAcetam (KEPPRA) 500 mg in 0.9% sodium chloride (MBP/ADV) 100 mL MBP  500 mg IntraVENous Q12H    NUTRITIONAL SUPPORT ELECTROLYTE PRN ORDERS   Does Not Apply PRN    dextrose 40% (GLUTOSE) oral gel 1 Tube  15 g Oral PRN    glucagon (GLUCAGEN) injection 1 mg  1 mg IntraMUSCular PRN    dextrose (D50W) injection syrg 12.5-25 g  25-50 mL IntraVENous PRN       Review of Systems   Unobtainable due to patient status. Objective:     Vitals:    07/12/21 0334 07/12/21 0619 07/12/21 0701 07/12/21 0743   BP:   (!) 119/58    Pulse: 69  (!) 57    Resp: 19  17    Temp:       SpO2: 100%  100% 100%   Weight:  171 lb 1.2 oz (77.6 kg)     Height:         Blood pressure (!) 119/58, pulse (!) 57, temperature 98.4 °F (36.9 °C), resp. rate 17, height 5' 5\" (1.651 m), weight 171 lb 1.2 oz (77.6 kg), SpO2 100 %. Intake/Output Summary (Last 24 hours) at 7/12/2021 0810  Last data filed at 7/12/2021 1503  Gross per 24 hour   Intake 2257 ml   Output 2950 ml   Net -693 ml         Physical Exam:          Constitutional:  intubated and mechanically ventilated. EENMT:  Sclera clear, pupils equal, oral mucosa moist  Respiratory: CTA b/l. No wheezing. Cardiovascular:  RRR with no M,G,R;  Gastrointestinal:  soft with no tenderness; positive bowel sounds present  Musculoskeletal:  warm with no cyanosis, no lower extremity edema  Skin:  no jaundice or ecchymosis  Neurologic: no gross neuro deficits     Psychiatric: on vent      LINES:    ETT  Clif[place]  NG  PIV    DRIPS:    TF    CXR:  On 7/11 -- CHF and left side infiltrates > right.        Portable view of the chest      COMPARISON: July 9, 2021     CLINICAL HISTORY: Respiratory failure.     FINDINGS:     Stable postsurgical changes of sternotomy. Endotracheal tube and enteric tube  are stable. Heart is enlarged. Bilateral interstitial densities, likely  pulmonary edema There may be small pleural effusions. No pneumothorax.        IMPRESSION     1. ET tube tip is in good position.     2. Mild pulmonary edema and suggestion of small effusions. No significant change  in the appearance of the lungs. UA positive  procal <0.05     Cultures  U/C - 7/6 with MDR Klebsiella/Enterobacter s-gent/tobramycin/bactrim  B/C 7/6 - NG Anerobic GNR  B/C x 2 7/7 NG x 4  R/C 7/8 with Klebsiella pneumonia    Ventilator Settings  Mode FIO2 Rate Tidal Volume Pressure PEEP   VC+  30 %    450 ml  14 cm H2O (increased due to RR up and Vt down.)  8 cm H20      Peak airway pressure: 29 cm H2O   Minute ventilation: 8.88 l/min        Recent Labs     07/11/21  2343 07/11/21  1746 07/11/21  1115 07/11/21  0558 07/11/21  0031   GLUCPOC 195* 146* 236* 208* 121*        LABS    Recent Labs     07/12/21  0412 07/11/21  0324 07/10/21  0312   WBC 8.3 8.0 9.5   HGB 7.5* 8.2* 8.5*   HCT 24.2* 26.3* 27.1*   * 174 179     Recent Labs     07/12/21  0412 07/11/21  0324 07/10/21  0312    144 142   K 4.0 3.9 4.1   * 115* 114*   * 160* 215*   CO2 26 24 23   BUN 32* 32* 36*   CREA 1.18* 1.13* 1.33*     Recent Labs     07/11/21  0358 07/10/21  0420   PHI 7.34* 7.34*   PCO2I 42.4 40.4   PO2I 98 94   HCO3I 23.1 22.0     No results for input(s): LCAD, LAC in the last 72 hours.         Assessment:  (Medical Decision Making)     Hospital Problems  Date Reviewed: 6/30/2021        Codes Class Noted POA    Aspiration pneumonia ([facility] -- likely present on admission and more noticable past few days ICD-10-CM: J69.0  ICD-9-CM: 507.0  7/9/2021 Unknown        Seizure ([facility] ICD-10-CM: R56.9  ICD-9-CM: 780.39  7/7/2021 Unknown        Ac[facility] respiratory failure ([facility] ICD-10-CM: J96.00  ICD-9-CM: 518.81  7/6/2021 Yes        TANISHA (obstructive sleep apnea) (Chronic) ICD-10-CM: G47.33  ICD-9-CM: 327.23  6/28/2021 Yes        Severe sepsis ([facility] ICD-10-CM: A41.9, R65.20  ICD-9-CM: 038.9, 995.92  8/8/2019 Unknown        Essential hypertension (Chronic) ICD-10-CM: I10  ICD-9-CM: 401.9  6/15/2015 Yes        Chronic kidney disease, stage III (moderate) (HCC) (Chronic) ICD-10-CM: N18.30  ICD-9-CM: 585.3  6/15/2015 Yes        Gastroesophageal reflux disease without esophagitis (Chronic) ICD-10-CM: K21.9  ICD-9-CM: 530.81  6/15/2015 Yes        * (Principal) Diabetes mellitus with hyperosmolarity without hyperglycemic hyperosmolar nonketotic coma ([facility] (Chronic) ICD-10-CM: E11.00  ICD-9-CM: 250.20  11/24/2014 Yes            Patient with DM/HTN/CKD/TANISHA/prior VC paralysis with trach/ s/p trach and was in rehab and came in with respiratory distress and unresponsive. Now with seizures. 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 Now with UTI and PNA -- some with MDR. Trying PS but not waking up much    Plan:  (Medical Decision Making)     --PS trial again and f/u ABG later today  --give agitation will add in Risperdal 0.25 mg BID to see if helps  --continue Meropenem and check if the Enterobacter is sensitive to Merropenem. EOT is 7/17 for 7 days course. U/C -   MDR Klebsiella/Enterobacter s-gent/tobramycin/bactrim and R/C 7/8 with Klebsiella pneumonia.  -continue lantus to 14 Units, f/u in AM if need to increase  -continue keepra and no seizures noted  occasionally bradycardia will f/u. On lopressor 12.5 BID and also on hydralazine 50 mg q6. BP controlled. -IV Lasix and f/u CT tomorrow and check CXR. May need to stop soon  -hg lower and f/u. Recently occult blood negative. No bleeding noted. -GI/DVT prophylaxis  -cont.  TF  -full code per patient's wishes -- she recently changed from DNR about 2 weeks prior to this admission per daughter, Simon Forrest      The patient is critically ill with respiratory failure, circulatory failure and requires high complexity decision making for assessment and support including frequent ventilator adjustment , frequent evaluation and titration of therapies , application of advanced monitoring technologies and extensive interpretation of multiple databases  Time devoted to patient care services described in this note- 17 min face to face/ 20 min total evaluation time    Cumulative time devoted to patient care services by me for day of service -37 min       More than 50% of the time documented was spent in face-to-face contact with the patient and in the care of the patient on the floor/unit where the patient is located.     Marco A Luna MD

## 2021-07-13 NOTE — PROGRESS NOTES
This is a follow-up visit to the patient, providing a spiritual presence, emotional support and prayer. The patient appears to be resting. Her daughter, Haim Narayan was present. We talked about discerning and receiving \"God's Will\" in our lives.     Angelo Reyes, [address] [facility], [facility]

## 2021-07-13 NOTE — PROGRESS NOTES
Ventilator check complete; patient has a #7. 0 ET tube secured at the 21 at the lip. Patient is not sedated. Patient is not able to follow commands. Breath sounds are coarse and diminished. Trachea is midline, Negative for subcutaneous air, and chest excursion is symmetric. Patient is also Negative for cyanosis and is Positive for pitting edema. All alarms are set and audible. Resuscitation bag is at the head of the bed.       Ventilator Settings  Mode FIO2 Rate Tidal Volume Pressure PEEP I:E Ratio   Pressure support  30 %     15 cm H2O  8 cm H20  1:2.7      Peak airway pressure: 23 cm H2O   Minute ventilation: 9.45 l/min       Agustin Perez RT

## 2021-07-13 NOTE — PROGRESS NOTES
Critical Care Daily Progress Note: 7/13/2021  Admission Date: 7/6/2021     The patient's chart is reviewed and the patient is discussed with the staff. Patient is a 78 y.o.  female presents with acute respiratory failure,Patient is well known to us, she had prolong hospital stay due to COVID-19 respiratory failure in 12/2020 -1/2021, had trach, was on HD due to acute renal failure, also had cardiac arrest, she eventually went to [facility] and to [facility] afterwards. 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 After her recent hospital stay in 6/2021. Her daughter is here and helps with the history. Today she as having trouble breathing and when EMS arrived to [facility] patient was in distress, sat were in 50's and she was intubated. She is currently sedated,on vent.,she is hypothermic also -94 and also has elevated WBC.     Patient with seziures on 7/7 and w/u in progress. had MRI and revealed remote infarct    Subjective:     On vent and not following commands. Less agitated. Still in restraints. Nurse reports daughter now does not want trach. Daughter not here and was not direction she spoke with me yesterday. Does drool.  No other issues overnight       Current Facility-Administered Medications   Medication Dose Route Frequency    scopolamine (TRANSDERM-SCOP) 1 mg over 3 days 1 Patch  1 Patch TransDERmal Q72H    risperiDONE (RisperDAL) 1 mg/mL oral solution soln 0.25 mg  0.25 mg Nasogastric BID    furosemide (LASIX) injection 40 mg  40 mg IntraVENous DAILY    LORazepam (ATIVAN) injection 0.5 mg  0.5 mg IntraVENous Q6H PRN    insulin glargine (LANTUS) injection 14 Units  14 Units SubCUTAneous DAILY    meropenem (MERREM) 500 mg in 0.9% sodium chloride (MBP/ADV) 50 mL MBP  0.5 g IntraVENous Q8H    polyethylene glycol (MIRALAX) packet 17 g  17 g Per G Tube DAILY PRN    metoprolol tartrate (LOPRESSOR) tablet 12.5 mg  12.5 mg Per G Tube BID    hydrALAZINE (APRESOLINE) 20 mg/mL injection 10 mg  10 mg IntraVENous Q6H PRN    hydrALAZINE (APRESOLINE) tablet 50 mg  50 mg Per G Tube Q6H    alcohol 62% (NOZIN) nasal  1 Ampule  1 Ampule Topical Q12H    insulin regular (NOVOLIN R, HUMULIN R) injection   SubCUTAneous Q6H    pantoprazole (PROTONIX) 40 mg in 0.9% sodium chloride 10 mL injection  40 mg IntraVENous DAILY    0.9% sodium chloride infusion 250 mL  250 mL IntraVENous PRN    levETIRAcetam (KEPPRA) 500 mg in 0.9% sodium chloride (MBP/ADV) 100 mL MBP  500 mg IntraVENous Q12H    NUTRITIONAL SUPPORT ELECTROLYTE PRN ORDERS   Does Not Apply PRN    dextrose 40% (GLUTOSE) oral gel 1 Tube  15 g Oral PRN    glucagon (GLUCAGEN) injection 1 mg  1 mg IntraMUSCular PRN    dextrose (D50W) injection syrg 12.5-25 g  25-50 mL IntraVENous PRN       Review of Systems   Unobtainable due to patient status. Objective:     Vitals:    07/13/21 0631 07/13/21 0701 07/13/21 0706 07/13/21 0731   BP: 133/64 (!) 126/58  (!) 152/69   Pulse: 65 (!) 51 69 62   Resp: 18 18 22 19   Temp:       SpO2: 100% 100% 99% 100%   Weight:       Height:         Blood pressure (!) 152/69, pulse 62, temperature 97.6 °F (36.4 °C), resp. rate 19, height 5' 5\" (1.651 m), weight 171 lb 4.8 oz (77.7 kg), SpO2 100 %. Intake/Output Summary (Last 24 hours) at 7/13/2021 0859  Last data filed at 7/13/2021 5099  Gross per 24 hour   Intake 2140 ml   Output 945 ml   Net 1195 ml         Physical Exam:          Constitutional:  intubated and mechanically ventilated. EENMT:  Sclera clear, pupils equal, oral mucosa moist  Respiratory: CTA b/l. No wheezing.    Cardiovascular:  RRR with no M,G,R;  Gastrointestinal:  soft with no tenderness; positive bowel sounds present  Musculoskeletal:  warm with no cyanosis, no lower extremity edema  Skin:  no jaundice or ecchymosis  Neurologic: moving but not following commands  Psychiatric: on vent      LINES:    ETT  Elodia MANDUJANO  PIV    DRIPS:    TF    CXR:  On 7/13 -- CHF and left side infiltrates > right, all decreased vs 48 hours prior            Cultures  U/C - 7/6 with MDR Klebsiella/Enterobacter s-gent/tobramycin/bactrim  B/C 7/6 - NG Anerobic GNR  B/C x 2 7/7 NG x 4  R/C 7/8 with Klebsiella pneumonia    Ventilator Settings  Mode FIO2 Rate Tidal Volume Pressure PEEP   VC+  29 %    0.45 ml  15 cm H2O  8 cm H20      Peak airway pressure: 31 cm H2O   Minute ventilation: 7.11 l/min        Recent Labs     07/13/21  0627 07/13/21  0000 07/12/21  1732 07/12/21  1217 07/12/21  0959   GLUCPOC 194* 200* 196* 259* 288*        LABS    Recent Labs     07/13/21  0347 07/12/21  0412 07/11/21  0324   WBC 8.2 8.3 8.0   HGB 7.5* 7.5* 8.2*   HCT 24.2* 24.2* 26.3*   * 143* 174     Recent Labs     07/13/21  0347 07/12/21  0412 07/11/21  0324    144 144   K 3.7 4.0 3.9   * 113* 115*   * 261* 160*   CO2 26 26 24   BUN 33* 32* 32*   CREA 1.08* 1.18* 1.13*     Recent Labs     07/13/21  0410 07/12/21  1154 07/11/21  0358   PHI 7.39 7.41 7.34*   PCO2I 43.6 40.8 42.4   PO2I 89 105* 98   HCO3I 26.2* 25.6 23.1     No results for input(s): LCAD, LAC in the last 72 hours.         Assessment:  (Medical Decision Making)     Hospital Problems  Date Reviewed: 6/30/2021        Codes Class Noted POA    Aspiration pneumonia ([facility] [facility] -- likely present on admission and more noticable past few days ICD-10-CM: J69.0  ICD-9-CM: 507.0  7/9/2021 Unknown        Seizure ([facility] [address] ICD-10-CM: R56.9  ICD-9-CM: 780.39  7/7/2021 Unknown        Acute respiratory failure ([address] ICD-10-CM: J96.00  ICD-9-CM: 518.81  7/6/2021 Yes        TANISHA (obstructive sleep apnea) (Chronic) ICD-10-CM: G47.33  ICD-9-CM: 327.23  6/28/2021 Yes        Severe sepsis ([facility] ICD-10-CM: A41.9, R65.20  ICD-9-CM: 038.9, 995.92  8/8/2019 Unknown        Essential hypertension (Chronic) ICD-10-CM: I10  ICD-9-CM: 401.9  6/15/2015 Yes        Chronic kidney disease, stage III (moderate) (HCC) (Chronic) ICD-10-CM: N18.30  ICD-9-CM: 585.3 6/15/2015 Yes        Gastroesophageal reflux disease without esophagitis (Chronic) ICD-10-CM: K21.9  ICD-9-CM: 530.81  6/15/2015 Yes        * (Principal) Diabetes mellitus with hyperosmolarity without hyperglycemic hyperosmolar nonketotic coma ([facility] [address] (Chronic) ICD-10-CM: E11.00  ICD-9-CM: 250.20  11/24/2014 Yes            Patient with DM/HTN/CKD/TANISHA/prior VC paralysis with trach/ s/p trach and was in rehab and came in with respiratory distress and unresponsive. Now with seizures. 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 Now with UTI and PNA -- some with MDR. Trying PS but not waking up much    Plan:  (Medical Decision Making)     --PS trial again and tolerating. Clarify Trach or not with daughter -- Zohreh Rogers. Will have to engage Surgery by Thursday for possible next week if she want it. She has had a trach in the past  --less agitation since started Risperdal 0.25 mg BID on 7/12  --continue Meropenem and check if the Enterobacter is sensitive to Merropenem. EOT is 7/17 for 7 days course. U/C -   MDR Klebsiella/Enterobacter s-gent/tobramycin/bactrim and R/C 7/8 with Klebsiella pneumonia.  -continue lantus to 14 Units and sugars <200 now.   -continue keepra and no seizures noted  --stop lopressor since HR in 40's with it. BP controlled and continue hydralazine 50 mg q6.    -cotninue IV Lasix and  CXR less congested. Cr is coming down. In postive balance and f/u I's and O's.   -hg stable in 7's and recently occult blood negative. No bleeding noted. -GI/DVT prophylaxis  -cont. TF  -full code per patient's wishes -- clarify Selvin Tavares now, as per above.  Noted prior admission was DNR but patient' per daughter, Zohreh Rogers changed her wishes      The patient is critically ill with respiratory failure, circulatory failure and requires high complexity decision making for assessment and support including frequent ventilator adjustment , frequent evaluation and titration of therapies , application of advanced monitoring technologies and extensive interpretation of multiple databases  Time devoted to patient care services described in this note- is 25 minutes today. More than 50% of the time documented was spent in face-to-face contact with the patient and in the care of the patient on the floor/unit where the patient is located.     Court Rocha MD

## 2021-07-13 NOTE — PROGRESS NOTES
Ventilator check complete; patient has a #7. 0 ET tube secured at the 21 at the lip. Patient is not sedated. Patient is not able to follow commands. Breath sounds are coarse. Trachea is midline, Negative for subcutaneous air, and chest excursion is symmetric. Patient is also Negative for cyanosis and is Negative for pitting edema. All alarms are set and audible. Resuscitation bag is at the head of the bed.       Ventilator Settings  Mode FIO2 Rate Tidal Volume Pressure PEEP I:E Ratio   VC+  29 %    0.45 ml  15 cm H2O  8 cm H20  1:2.7      Peak airway pressure: 31 cm H2O   Minute ventilation: 7.11 l/min         Margjaradtte Beni, RT

## 2021-07-13 NOTE — PROGRESS NOTES
Ventilator check complete; patient has a #7. 0 ET tube secured at the 21 at the lip. Patient is not sedated. Patient is able to follow commands. Breath sounds are coarse. Trachea is midline, Negative for subcutaneous air, and chest excursion is symmetric. Patient is also Negative for cyanosis and is Negative for pitting edema. All alarms are set and audible. Resuscitation bag is at the head of the bed.       Ventilator Settings  Mode FIO2 Rate Tidal Volume Pressure PEEP I:E Ratio   Pressure support  30 %     15 cm H2O  8 cm H20  1:2.7      Peak airway pressure: 23 cm H2O   Minute ventilation: 11.1 l/min     ABG:       Matias Ramirez, RT

## 2021-07-14 NOTE — PROGRESS NOTES
A follow up visit was made to the patient. Emotional support, spiritual presence and   prayer were provided for the patient. She was awake this morning and indicated that she would like a prayer.       Jose Miguel Mello, [address] [facility], [facility]

## 2021-07-14 NOTE — PROGRESS NOTES
Critical Care Daily Progress Note: 7/14/2021  Admission Date: 7/6/2021     The patient's chart is reviewed and the patient is discussed with the staff. Patient is a 78 y.o.  female presents with acute respiratory failure,Patient is well known to us, she had prolong hospital stay due to COVID-19 respiratory failure in 12/2020 -1/2021, had trach, was on HD due to acute renal failure, also had cardiac arrest, she eventually went to [place] and to [facility] afterwards. 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 After her recent hospital stay in 6/2021. Her daughter is here and helps with the history. Today she as having trouble breathing and when EMS arrived to [facility] patient was in distress, sat were in 50's and she was intubated. She is currently sedated,on vent.,she is hypothermic also -94 and also has elevated WBC.     Patient with seziures on 7/7 and w/u in progress. had MRI and revealed remote infarct    Subjective:     Patient is awake today on the ventilator. She is actually following commands. She denies dyspnea or pain. She reports with nurse present that she does not want a trach.   Patient is on pressure support trial       Current Facility-Administered Medications   Medication Dose Route Frequency    scopolamine (TRANSDERM-SCOP) 1 mg over 3 days 1 Patch  1 Patch TransDERmal Q72H    famotidine (PEPCID) 40 mg/5 mL (8 mg/mL) oral suspension 20 mg  20 mg Per G Tube DAILY    risperiDONE (RisperDAL) 1 mg/mL oral solution soln 0.25 mg  0.25 mg Nasogastric BID    furosemide (LASIX) injection 40 mg  40 mg IntraVENous DAILY    LORazepam (ATIVAN) injection 0.5 mg  0.5 mg IntraVENous Q6H PRN    insulin glargine (LANTUS) injection 14 Units  14 Units SubCUTAneous DAILY    meropenem (MERREM) 500 mg in 0.9% sodium chloride (MBP/ADV) 50 mL MBP  0.5 g IntraVENous Q8H    polyethylene glycol (MIRALAX) packet 17 g  17 g Per G Tube DAILY PRN    hydrALAZINE (APRESOLINE) 20 mg/mL injection 10 mg  10 mg IntraVENous Q6H PRN    hydrALAZINE (APRESOLINE) tablet 50 mg  50 mg Per G Tube Q6H    alcohol 62% (NOZIN) nasal  1 Ampule  1 Ampule Topical Q12H    insulin regular (NOVOLIN R, HUMULIN R) injection   SubCUTAneous Q6H    0.9% sodium chloride infusion 250 mL  250 mL IntraVENous PRN    levETIRAcetam (KEPPRA) 500 mg in 0.9% sodium chloride (MBP/ADV) 100 mL MBP  500 mg IntraVENous Q12H    NUTRITIONAL SUPPORT ELECTROLYTE PRN ORDERS   Does Not Apply PRN    dextrose 40% (GLUTOSE) oral gel 1 Tube  15 g Oral PRN    glucagon (GLUCAGEN) injection 1 mg  1 mg IntraMUSCular PRN    dextrose (D50W) injection syrg 12.5-25 g  25-50 mL IntraVENous PRN       Review of Systems  Limited since on ventilator as per above          Objective:     Vitals:    07/14/21 0510 07/14/21 0532 07/14/21 0601 07/14/21 0700   BP: (!) 102/56 129/60 (!) 106/59    Pulse: 84 80 72    Resp: 28 26 20    Temp:    97.3 °F (36.3 °C)   SpO2: 100% 100% 100%    Weight:       Height:         Blood pressure (!) 106/59, pulse 72, temperature 97.3 °F (36.3 °C), resp. rate 20, height 5' 5\" (1.651 m), weight 171 lb 4.8 oz (77.7 kg), SpO2 100 %. Intake/Output Summary (Last 24 hours) at 7/14/2021 0809  Last data filed at 7/14/2021 0519  Gross per 24 hour   Intake 2067 ml   Output 1075 ml   Net 992 ml         Physical Exam:          Constitutional:  intubated and mechanically ventilated. EENMT:  Sclera clear, pupils equal, oral mucosa moist  Respiratory: CTA b/l. No wheezing.    Cardiovascular:  RRR with no M,G,R;  Gastrointestinal:  soft with no tenderness; positive bowel sounds present  Musculoskeletal:  warm with no cyanosis, no lower extremity edema  Skin:  no jaundice or ecchymosis  Neurologic: moving but not following commands  Psychiatric: on vent      LINES:    ETT  [place]  NG  PIV    DRIPS:    TF    CXR:  On 7/13 -- CHF and left side infiltrates > right, all decreased vs 48 hours prior    Cultures  U/C - 7/6 with MDR Klebsiella/Enterobacter s-gent/tobramycin/bactrim  B/C 7/6 - NG Anerobic GNR  B/C x 2 7/7 NG x 4  R/C 7/8 with Klebsiella pneumonia    Ventilator Settings  Mode FIO2 Rate Tidal Volume Pressure PEEP   Pressure control  30 %    0.45 ml  15 cm H2O  8 cm H20      Peak airway pressure: 23 cm H2O   Minute ventilation: 8.23 l/min        Recent Labs     07/14/21  0505 07/13/21  2347 07/13/21  1809 07/13/21  1111 07/13/21  0627   GLUCPOC 174* 109* 211* 226* 194*        LABS    Recent Labs     07/14/21  0322 07/13/21  0347 07/12/21  0412   WBC 7.8 8.2 8.3   HGB 8.3* 7.5* 7.5*   HCT 26.8* 24.2* 24.2*   * 135* 143*     Recent Labs     07/14/21  0322 07/13/21  0347 07/12/21  0412    144 144   K 3.9 3.7 4.0   * 114* 113*   * 191* 261*   CO2 28 26 26   BUN 36* 33* 32*   CREA 1.10* 1.08* 1.18*   MG 2.1  --   --    PHOS 3.4  --   --      Recent Labs     07/14/21  0335 07/13/21  0410 07/12/21  1154   PHI 7.36 7.39 7.41   PCO2I 49.2* 43.6 40.8   PO2I 79 89 105*   HCO3I 27.6* 26.2* 25.6     No results for input(s): LCAD, LAC in the last 72 hours.         Assessment:  (Medical Decision Making)     Hospital Problems  Date Reviewed: 6/30/2021        Codes Class Noted POA    Aspiration pneumonia ([facility] -- likely present on admission and more noticable past few days ICD-10-CM: J69.0  ICD-9-CM: 507.0  7/9/2021 Unknown        Seizure ([facility] ICD-10-CM: R56.9  ICD-9-CM: 780.39  7/7/2021 Unknown        Acute respiratory failure ([address] ICD-10-CM: J96.00  ICD-9-CM: 518.81  7/6/2021 Yes        TANISHA (obstructive sleep apnea) (Chronic) ICD-10-CM: G47.33  ICD-9-CM: 327.23  6/28/2021 Yes        Severe sepsis ([facility] ICD-10-CM: A41.9, R65.20  ICD-9-CM: 038.9, 995.92  8/8/2019 Unknown        Essential hypertension (Chronic) ICD-10-CM: I10  ICD-9-CM: 401.9  6/15/2015 Yes        Chronic kidney disease, stage III (moderate) (HCC) (Chronic) ICD-10-CM: N18.30  ICD-9-CM: 585.3  6/15/2015 Yes Gastroesophageal reflux disease without esophagitis (Chronic) ICD-10-CM: K21.9  ICD-9-CM: 530.81  6/15/2015 Yes        * (Principal) Diabetes mellitus with hyperosmolarity without hyperglycemic hyperosmolar nonketotic coma ([facility] (Chronic) ICD-10-CM: E11.00  ICD-9-CM: 250.20  11/24/2014 Yes            Patient with DM/HTN/CKD/TANISHA/prior VC paralysis with trach/ s/p trach and was in rehab and came in with respiratory distress and unresponsive. Now with seizures. 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 Now with UTI and PNA -- some with MDR. Trying PS and is waking up today. Plan:  (Medical Decision Making)     --PS trial again at 15/8 and awake. I did decrease it down to 12/8. She indicates she does not want a tracheostomy. Will talk to her daughter Errol Fragoso when she is here. Can then also clarify CODE STATUS. Currently she is a full code. Previous admission she was a DNR.  --Continue Risperdal 0.25 mg twice daily, it is helping. Off other drips  --continue Meropenem and the Enterobacter is sensitive to Merropenem. EOT is 7/17 for 7 days course. U/C -   MDR Klebsiella/Enterobacter s-gent/tobramycin/bactrim and R/C 7/8 with Klebsiella pneumonia.  -continue lantus to 14 Units and sugars are controlled. -continue keepra and no seizures noted  --Off Lopressor and heart rate is improved. Continue hydralazine 50 every 6.    Patient is in positive balance, but sounds good. We will get chest x-ray tomorrow. Creatinine is in the 1.1 range.  -hg is coming up to the 8 range. No bleeding. -GI/DVT prophylaxis  -cont. TF  -full code per patient's wishes -- clarify Marivel Vidal now, as per above. See events from this morning. Daughter will be coming in later today.       The patient is critically ill with respiratory failure, circulatory failure and requires high complexity decision making for assessment and support including frequent ventilator adjustment , frequent evaluation and titration of therapies , application of advanced monitoring technologies and extensive interpretation of multiple databases  Time devoted to patient care services described in this note- is 26 minutes today. More than 50% of the time documented was spent in face-to-face contact with the patient and in the care of the patient on the floor/unit where the patient is located.     Chace Pickard MD

## 2021-07-14 NOTE — PROGRESS NOTES
Bedside and verbal shift change report received from  [address] [place] [address] (offgoing nurse). Report included the following information SBAR, Kardex, Intake/Output, MAR, Recent Results, Med Rec Status, Cardiac Rhythm NSR and Alarm Parameters .      Dual skin assessment completed at bedside: skin dry and intact (list pertinent skin assessment findings)    Dual verification of gtts completed (name of gtts verified): N/A

## 2021-07-14 NOTE — PROGRESS NOTES
Respiratory Mechanics completed and are as follows:  Weaning Parameters  Spontaneous Breathing Trial Complete: Yes  Resp Rate Observed: 35  Ve: 13.3  VT: 380  RSBI: 92  S[place]ling Agitation Sedation Scale (RASS): Light sedation  Patient extubated to a BIPAP. Patient is able to communicate and is negative for stridor. Breath sounds are diminished. No complications with extubation.      Hakan Hunter

## 2021-07-14 NOTE — PROGRESS NOTES
Chart reviewed and pt discussed in am IDR. Remains ICU intubated/vent -day 7. 30% fio2, staff attempting PS trials. Pt now awake and responding to staff. Not wanting trach, but MD to discuss with daughter when here. Pt admitted from [facility] for LTC. [facility] bed hold. CM following for any assist and d/c needs/POC when stable. Daughter had wanted return to [facility]. LOS 8 days.

## 2021-07-14 NOTE — PROGRESS NOTES
Problem: Ventilator Management  Goal: *Adequate oxygenation and ventilation  Outcome: Progressing Towards Goal  Goal: *Patient maintains clear airway/free of aspiration  Outcome: Progressing Towards Goal  Goal: *Absence of infection signs and symptoms  Outcome: Progressing Towards Goal  Goal: *Normal spontaneous ventilation  Outcome: Progressing Towards Goal     Problem: Delirium Treatment  Goal: *Absence of falls  Outcome: Progressing Towards Goal     Problem: Falls - Risk of  Goal: *Absence of Falls  Description: Document Hood Fall Risk and appropriate interventions in the flowsheet. 7/13/2021 2257 by Saad Rivera RN  Outcome: Progressing Towards Goal  Note: Fall Risk Interventions:       Mentation Interventions: Adequate sleep, hydration, pain control, Bed/chair exit alarm, Door open when patient unattended, Increase mobility, More frequent rounding, Reorient patient, Room close to nurse's station    Medication Interventions: Bed/chair exit alarm    Elimination Interventions: Bed/chair exit alarm, Toileting schedule/hourly rounds           7/13/2021 2255 by Saad Rivera RN  Outcome: Progressing Towards Goal  Note: Fall Risk Interventions:       Mentation Interventions: Adequate sleep, hydration, pain control, Bed/chair exit alarm, Door open when patient unattended, Increase mobility, More frequent rounding, Reorient patient, Room close to nurse's station    Medication Interventions: Bed/chair exit alarm    Elimination Interventions: Bed/chair exit alarm, Toileting schedule/hourly rounds              Problem: Pressure Injury - Risk of  Goal: *Prevention of pressure injury  Description: Document Ashu Scale and appropriate interventions in the flowsheet.   7/13/2021 2257 by Saad Rivera RN  Outcome: Progressing Towards Goal  Note: Pressure Injury Interventions:  Sensory Interventions: Assess changes in LOC, Assess need for specialty bed, Avoid rigorous massage over bony prominences, Check visual cues for pain, Discuss PT/OT consult with provider, Keep linens dry and wrinkle-free, Maintain/enhance activity level, Minimize linen layers, Monitor skin under medical devices, Pressure redistribution bed/mattress (bed type), Turn and reposition approx. every two hours (pillows and wedges if needed)    Moisture Interventions: Absorbent underpads, Apply protective barrier, creams and emollients, Check for incontinence Q2 hours and as needed, Internal/External urinary devices, Maintain skin hydration (lotion/cream), Minimize layers    Activity Interventions: Assess need for specialty bed, Increase time out of bed, Pressure redistribution bed/mattress(bed type)    Mobility Interventions: Assess need for specialty bed, Float heels, HOB 30 degrees or less, Pressure redistribution bed/mattress (bed type), Turn and reposition approx. every two hours(pillow and wedges)    Nutrition Interventions: Document food/fluid/supplement intake    Friction and Shear Interventions: Apply protective barrier, creams and emollients, Foam dressings/transparent film/skin sealants, HOB 30 degrees or less, Lift sheet, Lift team/patient mobility team, Minimize layers, Transferring/repositioning devices             7/13/2021 7100 by Kojo Jewell RN  Outcome: Progressing Towards Goal  Note: Pressure Injury Interventions:  Sensory Interventions: Assess changes in LOC, Assess need for specialty bed, Avoid rigorous massage over bony prominences, Check visual cues for pain, Discuss PT/OT consult with provider, Keep linens dry and wrinkle-free, Maintain/enhance activity level, Minimize linen layers, Monitor skin under medical devices, Pressure redistribution bed/mattress (bed type), Turn and reposition approx.  every two hours (pillows and wedges if needed)    Moisture Interventions: Absorbent underpads, Apply protective barrier, creams and emollients, Check for incontinence Q2 hours and as needed, Internal/External urinary devices, Maintain skin hydration (lotion/cream), Minimize layers    Activity Interventions: Assess need for specialty bed, Increase time out of bed, Pressure redistribution bed/mattress(bed type)    Mobility Interventions: Assess need for specialty bed, Float heels, HOB 30 degrees or less, Pressure redistribution bed/mattress (bed type), Turn and reposition approx. every two hours(pillow and wedges)    Nutrition Interventions: Document food/fluid/supplement intake    Friction and Shear Interventions: Apply protective barrier, creams and emollients, Foam dressings/transparent film/skin sealants, HOB 30 degrees or less, Lift sheet, Lift team/patient mobility team, Minimize layers, Transferring/repositioning devices                Problem: Risk for Spread of Infection  Goal: Prevent transmission of infectious organism to others  Description: Prevent the transmission of infectious organisms to other patients, staff members, and visitors.   7/13/2021 2257 by Aravind Moore RN  Outcome: Progressing Towards Goal  7/13/2021 2255 by Aravind Moore RN  Outcome: Progressing Towards Goal

## 2021-07-14 NOTE — PROGRESS NOTES
Spoke with daughter few times and aware mother does not want trach. She later discussed with me and would like to make mother DNR. No vent if fails extubation. Currently was tapering her to CPAP and told staff to have respiratory do lung mechanics for extubation. Not would not rush if fails since goal is not to re-intubate. Told Dr. Alli Loomis -- Night time intensivit and aware of plan.      Louie Barber MD

## 2021-07-14 NOTE — PROGRESS NOTES
Bedside and verbal shift change report received from  [facility] (offgoing nurse). Report included the following information SBAR, Kardex, Procedure Summary, Intake/Output and Recent Results. Dual skin assessment completed at bedside: WNL (list pertinent skin assessment findings)    Dual verification of gtts completed (name of gtts verified): n/a    Pt given Morphine 4mg IV for labored breathing, Dr. Isabel Barbour at bedside.

## 2021-07-15 NOTE — PROGRESS NOTES
Critical Care Daily Progress Note: 7/15/2021    [facility]   Admission Date: 7/6/2021           The patient's chart is reviewed and the patient is discussed with the staff. 78 y.o.  female presents with acute respiratory failure. Patient is well known to us, she had prolonged hospital stay due to COVID-19 respiratory failure in 12/2020 -1/2021, had trach, was on HD due to acute renal failure, also had cardiac arrest, she eventually went to [facility] and to [facility] afterwards. 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 She was using nocturnal CPAP with oxygen at the nursing home. She required intubation but was extubated on 7/14. She is against reintubation and does not want a trach so she is now a DNR. Her daughter, Mulugeta Adams, is involved in her care. She had a questionable seizure on 7/7 (? Secondary to Cefepime). There was no evidence of this on EEG (showed diffuse slowing). MRI showed a remote infarct. She is now on Keppra. Subjective:     She wants to take Bipap off. Daughter at bedside. Looks short of breath.       Current Facility-Administered Medications   Medication Dose Route Frequency    methylPREDNISolone (PF) (SOLU-MEDROL) injection 40 mg  40 mg IntraVENous Q4H    furosemide (LASIX) injection 40 mg  40 mg IntraVENous Q12H    morphine injection 2-4 mg  2-4 mg IntraVENous Q2H PRN    levETIRAcetam (KEPPRA) 500 mg in 0.9% sodium chloride (MBP/ADV) 100 mL MBP  500 mg IntraVENous Q12H    famotidine (PF) (PEPCID) 20 mg in 0.9% sodium chloride 10 mL injection  20 mg IntraVENous DAILY    hydrALAZINE (APRESOLINE) 20 mg/mL injection 20 mg  20 mg IntraVENous Q6H    albuterol (PROVENTIL VENTOLIN) nebulizer solution 2.5 mg  2.5 mg Nebulization Q6H PRN    racEPINEPHrine (VAPONEFRIN) 2.25% nebulizer solution  0.25 mL Nebulization Q4H PRN    scopolamine (TRANSDERM-SCOP) 1 mg over 3 days 1 Patch  1 Patch TransDERmal Q72H    risperiDONE (RisperDAL) 1 mg/mL oral solution soln 0.25 mg  0.25 mg Nasogastric BID    LORazepam (ATIVAN) injection 0.5 mg  0.5 mg IntraVENous Q6H PRN    insulin glargine (LANTUS) injection 14 Units  14 Units SubCUTAneous DAILY    meropenem (MERREM) 500 mg in 0.9% sodium chloride (MBP/ADV) 50 mL MBP  0.5 g IntraVENous Q8H    polyethylene glycol (MIRALAX) packet 17 g  17 g Per G Tube DAILY PRN    hydrALAZINE (APRESOLINE) 20 mg/mL injection 10 mg  10 mg IntraVENous Q6H PRN    alcohol 62% (NOZIN) nasal  1 Ampule  1 Ampule Topical Q12H    insulin regular (NOVOLIN R, HUMULIN R) injection   SubCUTAneous Q6H    0.9% sodium chloride infusion 250 mL  250 mL IntraVENous PRN    NUTRITIONAL SUPPORT ELECTROLYTE PRN ORDERS   Does Not Apply PRN    dextrose 40% (GLUTOSE) oral gel 1 Tube  15 g Oral PRN    glucagon (GLUCAGEN) injection 1 mg  1 mg IntraMUSCular PRN    dextrose (D50W) injection syrg 12.5-25 g  25-50 mL IntraVENous PRN       Objective:     Vitals:    07/15/21 0701 07/15/21 0716 07/15/21 0717 07/15/21 0824   BP: (!) 122/58  (!) 134/59 (!) 117/58   Pulse: (!) 103 98 95 89   Resp: 27 20 22    Temp: 97.6 °F (36.4 °C)      SpO2: 100% 100% 100%    Weight:       Height:           Intake and Output:   07/13 1901 - 07/15 0700  In: 1850 [I.V.:250]  Out: 2125 [Urine:2125]  07/15 0701 - 07/15 1900  In: 200 [I.V.:200]  Out: -     Physical Exam:          CONSTITUTIONAL:  the patient is well developed and in no acute distress  HEENT:  Sclera clear, pupils equal, oral mucosa moist  RESPIRATORY: clear but shallow respirations and looks short of breath. Wearing bipap with TV of 300 mls, rate 18, 40%  CARDIOVASCULAR  RRR without M,G,R  ABD/GI: soft and non-tender; with positive bowel sounds. EXT: warm without cyanosis. There is 1+ edema in both hands/arm and some in her feet.     SKIN:  no jaundice or rashes, no wounds   NEURO: able to nod head to answer questions - seems to understand    MUSCULOSKELETAL: moves all four extremities with equal strength. No deformities    ROS: Review of Systems - unable to obtain as she is wearing bipap and looks short of breath    LINES: coyle, peripheral IV    DRIPS:  none    CHEST XRAY:           LAB  Recent Labs     07/15/21  0329 07/14/21  0322 07/13/21  0347   WBC 10.0 7.8 8.2   HGB 8.7* 8.3* 7.5*   HCT 28.1* 26.8* 24.2*    148* 135*     Recent Labs     07/15/21  0329 07/14/21  0322 07/13/21  0347    144 144   K 4.4 3.9 3.7   * 113* 114*   CO2 29 28 26   * 164* 191*   BUN 42* 36* 33*   CREA 1.28* 1.10* 1.08*   MG  --  2.1  --    PHOS  --  3.4  --    CA 9.3 9.0 8.9       Assessment:  (Medical Decision Making)     Hospital Problems  Date Reviewed: 6/30/2021        Codes Class Noted POA    Aspiration pneumonia ([facility] -- likely present on admission and more noticable past few days ICD-10-CM: J69.0  ICD-9-CM: 507.0  7/9/2021 Unknown    On abx - normal WBC, no fever.  CXR with increased infiltrates on the right - ? edema    Seizure [facility]) ICD-10-CM: R56.9  ICD-9-CM: 780.39  7/7/2021 Unknown    None noted on EEG, on Keppra    Acute respiratory failure ([facility] ICD-10-CM: J96.00  ICD-9-CM: 518.81  7/6/2021 Yes    Using bipap now but extubated - DDNR    TANISHA (obstructive sleep apnea) (Chronic) ICD-10-CM: G04.75  ICD-9-CM: 327.23  6/28/2021 Yes    Used CPAP as outpatient    Severe sepsis [facility]) ICD-10-CM: A41.9, R65.20  ICD-9-CM: 038.9, 995.92  8/8/2019 Unknown    resolved    Essential hypertension (Chronic) ICD-10-CM: I10  ICD-9-CM: 401.9  6/15/2015 Yes    controlled    Chronic kidney disease, stage III (moderate) (HCC) (Chronic) ICD-10-CM: N18.30  ICD-9-CM: 585.3  6/15/2015 Yes    stable    Gastroesophageal reflux disease without esophagitis (Chronic) ICD-10-CM: K21.9  ICD-9-CM: 530.81  6/15/2015 Yes    stable    * (Principal) Diabetes mellitus with hyperosmolarity without hyperglycemic hyperosmolar nonketotic coma ([facility] (Chronic) ICD-10-CM: E11.00  ICD-9-CM: 250.20  11/24/2014 Yes BS mid to upper 100s with steroids          Plan:  (Medical Decision Making)   1. Discussed situation with her daughter, Mulugeta Adams. Patient is now a DNR. Patient wants bipap off - daughter considering comfort care. 2. IV lasix - replace coyle and monitor response. 3. Allow po diet if bipap can be removed  4. Stop risperdal - can use prn Morphine for dyspnea  5. Day 6/7 Merrem for suspected HAP  6. Pepcid for GI prophylaxis, add Lovenox for DVT prophylaxis  7. Short course steroids for stridor post extubation - none noted on exam today    Yuli Cummins NP    More than 50% of time documented was spent face-to-face contact with the patient and in the care of the patient on the floor/unit where the patient is located    The patient has been seen and examined by me personally, the chart,labs, and radiographic studies have been reviewed. Chest: CTA  Extremities: trace edema  On NIPPV, struggles off of it. Given patient not wanting tracheostomy, comfort care is appropriate. I agree with the above assessment and plan.     Swetha Barrientos MD.

## 2021-07-15 NOTE — PROGRESS NOTES
Bedside and verbal shift change report received from  Cyd Cushing (offgoing nurse). Report included the following information SBAR, Kardex, Intake/Output and Recent Results. Dual skin assessment completed at bedside: WNL (list pertinent skin assessment findings)    Dual verification of gtts completed (name of gtts verified): n/a    Pt is comfort care, family at bedside.

## 2021-07-15 NOTE — PROGRESS NOTES
Daughter Jude Marks at bedside and states that her and the patient has agreed with comfort measures     Patient agrees and RT at bedside to place pt on Gae Junes with MD Lonny CHARLES that patient wishes to be made comfort     New orders placed     Pt already DNR

## 2021-07-15 NOTE — PROGRESS NOTES
Nutrition:    Acknowledge: Tube Feeding Management (Pulmonary)  Diet: DIET NPO    Current plan of care indicates comfort measures only. TF have been D/C. Nutrition assessment deferred as Nutrition Intervention is not warranted secondary to current care plan. If nutrition intervention desired, consult Nutrition.       Electronically signed by Renu Jorgensen MS, RDN, LD 7/15/2021 at 1:40 PM  Contact: [phone]

## 2021-07-15 NOTE — PROGRESS NOTES
Bedside and verbal shift change report received from  [address] [place] [address]  (offgoing nurse). Report included the following information SBAR, Kardex, ED Summary, Intake/Output, MAR, Recent Results, Med Rec Status, Cardiac Rhythm ST, Alarm Parameters  and Quality Measures.      Dual skin assessment completed at bedside: WNL (list pertinent skin assessment findings)    Dual verification of gtts completed (name of gtts verified): NA     New pad placed     Pt repositioned     [place] and oral care preformed per protocol    Remains on BiPAP 40%    PRN  Morphine given HS

## 2021-07-15 NOTE — PROGRESS NOTES
A follow up visit was made to the patient. Emotional support, spiritual presence and   prayer were provided for the patient. The patient has been made comfort care.  Her daughter requested a visit from the  and prayer for her and her mother      Flip Bhavana, [address]

## 2021-07-15 NOTE — PROGRESS NOTES
Interdisciplinary team rounds were held 7/15/2021 with the following team members:Care Management, Nursing, Nurse Practitioner, Nutrition, Occupational Therapy, Palliative Care, Pastoral Care, Pharmacy, Physical Therapy, Physician, Respiratory Therapy and Clinical Coordinator and the patient. Plan of care discussed. See clinical pathway and/or care plan for interventions and desired outcomes.

## 2021-07-16 NOTE — DISCHARGE SUMMARY
Death Summary    Nomi Linton  Admission date:  7/6/2021  Date of death:  7/15/21    Admitting Diagnosis:  Acute respiratory failure ([facility] [J96.00]  Discharge Diagnosis:    Problem List as of 7/15/2021 Date Reviewed: 6/30/2021        Codes Class Noted - Resolved    Aspiration pneumonia ([facility] -- likely present on admission and more noticable past few days ICD-10-CM: J69.0  ICD-9-CM: 507.0  7/9/2021 - Present        Seizure ([facility] ICD-10-CM: R56.9  ICD-9-CM: 780.39  7/7/2021 - Present        Acute respiratory failure ([facility] ICD-10-CM: J96.00  ICD-9-CM: 518.81  7/6/2021 - Present        TANISHA (obstructive sleep apnea) (Chronic) ICD-10-CM: G47.33  ICD-9-CM: 327.23  6/28/2021 - Present        Stridor ICD-10-CM: R06.1  ICD-9-CM: 786.1  6/25/2021 - Present        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  ICD-9-CM: 478.30  6/25/2021 - Present        Acute hypercapnic respiratory failure ([facility] ICD-10-CM: J96.02  ICD-9-CM: 518.81  6/24/2021 - Present        Myoclonus (Chronic) ICD-10-CM: G25.3  ICD-9-CM: 333.2  5/30/2021 - Present        History of COVID-19 (Chronic) ICD-10-CM: Z86.16  ICD-9-CM: V12.09  5/30/2021 - Present        Constipation ICD-10-CM: K59.00  ICD-9-CM: 564.00  5/6/2021 - Present        Acute diastolic CHF (congestive heart failure) (HCC) ICD-10-CM: I50.31  ICD-9-CM: 428.31, 428.0  5/5/2021 - Present        Diastolic CHF, chronic (HCC) ICD-10-CM: I50.32  ICD-9-CM: 428.32, 428.0  5/5/2021 - Present        Pulmonary HTN ([facility] ICD-10-CM: I27.20  ICD-9-CM: 416.8  5/5/2021 - Present        UTI (urinary tract infection) ICD-10-CM: N39.0  ICD-9-CM: 599.0  5/5/2021 - Present        Chronic hypoxemic respiratory failure (HCC) ICD-10-CM: J96.11  ICD-9-CM: 518.83, 799.02  5/5/2021 - Present        Hypercapnemia ICD-10-CM: R06.89  ICD-9-CM: 786.09  5/5/2021 - Present        Abnormal LFTs ICD-10-CM: R94.5  ICD-9-CM: 790.6  5/5/2021 - Present        CHF exacerbation (HCC) ICD-10-CM: I50.9  ICD-9-CM: 428.0 4/16/2021 - Present        Cardiac arrest [facility]) ICD-10-CM: I46.9  ICD-9-CM: 427.5  12/30/2020 - Present        COVID-19 ICD-10-CM: U07.1  ICD-9-CM: 079.89  12/24/2020 - Present        Acute respiratory distress syndrome (ARDS) due to severe acute respiratory syndrome coronavirus 2 (SARS-CoV-2) ([facility] ICD-10-CM: U07.1, J80  ICD-9-CM: 518.82, 079.89  12/24/2020 - Present        Acute hypoxemic respiratory failure ([facility] ICD-10-CM: J96.01  ICD-9-CM: 518.81  12/24/2020 - Present        Noncompliance (Chronic) ICD-10-CM: Z91.19  ICD-9-CM: V15.81  3/8/2020 - Present        Hypoalbuminemia due to protein-calorie malnutrition ([facility] ICD-10-CM: E88.09, E46  ICD-9-CM: 273.8, 263.9  3/8/2020 - Present        Acute on chronic renal failure (HCC) ICD-10-CM: N17.9, N18.9  ICD-9-CM: 584.9, 585.9  9/17/2019 - Present        Generalized weakness ICD-10-CM: R53.1  ICD-9-CM: 780.79  9/16/2019 - Present        Decreased oral intake ICD-10-CM: R63.8  ICD-9-CM: 783.9  9/16/2019 - Present        Severe sepsis ([facility] ICD-10-CM: A41.9, R65.20  ICD-9-CM: 038.9, 995.92  8/8/2019 - Present        Anemia ICD-10-CM: D64.9  ICD-9-CM: 285.9  8/8/2019 - Present        Metabolic encephalopathy YDN-55-QT: G93.41  ICD-9-CM: 348.31  1/30/2019 - Present        Coronary artery disease involving coronary bypass graft of native heart without angina pectoris (Chronic) ICD-10-CM: I25.810  ICD-9-CM: 414.05  6/15/2015 - Present        Uncontrolled type 2 diabetes mellitus with hyperglycemia (HCC) (Chronic) ICD-10-CM: E11.65  ICD-9-CM: 250.02  6/15/2015 - Present        Essential hypertension (Chronic) ICD-10-CM: I10  ICD-9-CM: 401.9  6/15/2015 - Present        HLD (hyperlipidemia) (Chronic) ICD-10-CM: E78.5  ICD-9-CM: 272.4  6/15/2015 - Present        Chronic kidney disease, stage III (moderate) (HCC) (Chronic) ICD-10-CM: N18.30  ICD-9-CM: 585.3  6/15/2015 - Present        Osteopenia (Chronic) ICD-10-CM: M85.80  ICD-9-CM: 733.90  6/15/2015 - Present        Vitamin D deficiency (Chronic) ICD-10-CM: E55.9  ICD-9-CM: 268.9  6/15/2015 - Present        Gastroesophageal reflux disease without esophagitis (Chronic) ICD-10-CM: K21.9  ICD-9-CM: 530.81  6/15/2015 - Present        Other allergic rhinitis (Chronic) ICD-10-CM: J30.89  ICD-9-CM: 477.8  6/15/2015 - Present        Peripheral neuropathy (Chronic) ICD-10-CM: G62.9  ICD-9-CM: 356.9  6/15/2015 - Present        Primary osteoarthritis involving multiple joints (Chronic) ICD-10-CM: M89.49  ICD-9-CM: 715.98  6/15/2015 - Present        Insomnia (Chronic) ICD-10-CM: G47.00  ICD-9-CM: 780.52  6/15/2015 - Present        RLS (restless legs syndrome) (Chronic) ICD-10-CM: G25.81  ICD-9-CM: 333.94  6/15/2015 - Present        * (Principal) Diabetes mellitus with hyperosmolarity without hyperglycemic hyperosmolar nonketotic coma (HCC) (Chronic) ICD-10-CM: E11.00  ICD-9-CM: 250.20  11/24/2014 - Present        RESOLVED: Hyperkalemia ICD-10-CM: E87.5  ICD-9-CM: 276.7  5/5/2021 - 6/28/2021        RESOLVED: Encephalopathy ICD-10-CM: G93.40  ICD-9-CM: 348.30  2/7/2021 - 3/6/2021        RESOLVED: Acute cystitis without hematuria ICD-10-CM: N30.00  ICD-9-CM: 595.0  12/29/2020 - 1/25/2021        RESOLVED: Morbid obesity ([address] ICD-10-CM: E66.01  ICD-9-CM: 278.01  12/24/2020 - 12/28/2020        RESOLVED: Dehydration ICD-10-CM: E86.0  ICD-9-CM: 276.51  3/8/2020 - 1/25/2021        RESOLVED: Hypothermia ICD-10-CM: T68. XXXA  ICD-9-CM: 991.6  3/8/2020 - 1/25/2021        RESOLVED: Metabolic acidosis IAA-02-VI: E87.2  ICD-9-CM: 276.2  3/8/2020 - 1/25/2021        RESOLVED: Acute renal failure (ARF) ([facility] ICD-10-CM: N17.9  ICD-9-CM: 584.9  8/8/2019 - 1/25/2021        RESOLVED: Seizure-like activity ([facility] ICD-10-CM: R56.9  ICD-9-CM: 780.39  2/1/2019 - 8/8/2019        RESOLVED: Acute renal failure superimposed on stage 3 chronic kidney disease ([facility] ICD-10-CM: N17.9, N18.30  ICD-9-CM: 584.9, 585.3  1/31/2019 - 2/3/2019        RESOLVED: Hyperglycemia due to type 2 diabetes mellitus ([facility] ICD-10-CM: E11.65  ICD-9-CM: 250.00  1/30/2019 - 8/8/2019        RESOLVED: CAD (coronary artery disease) (Chronic) ICD-10-CM: I25.10  ICD-9-CM: 414.00  11/24/2014 - 6/15/2015        RESOLVED: CAD (coronary artery disease) ICD-10-CM: I25.10  ICD-9-CM: 414.00  4/21/2014 - 11/24/2014        RESOLVED: DM2 (diabetes mellitus, type 2) ([facility] ICD-10-CM: E11.9  ICD-9-CM: 250.00  4/21/2014 - 6/15/2015        RESOLVED: HTN (hypertension) ICD-10-CM: I10  ICD-9-CM: 401.9  4/21/2014 - 6/15/2015        RESOLVED: HLD (hyperlipidemia) ICD-10-CM: E78.5  ICD-9-CM: 272.4  4/21/2014 - 6/15/2015        RESOLVED: GERD (gastroesophageal reflux disease) ICD-10-CM: K21.9  ICD-9-CM: 530.81  4/21/2014 - 6/15/2015        RESOLVED: Allergic rhinitis ICD-10-CM: J30.9  ICD-9-CM: 477.9  4/21/2014 - 6/15/2015        RESOLVED: Peripheral neuropathy ICD-10-CM: G62.9  ICD-9-CM: 356.9  4/21/2014 - 6/15/2015        RESOLVED: Osteoarthritis of multiple joints ICD-10-CM: M15.9  ICD-9-CM: 715.89  4/21/2014 - 6/15/2015        RESOLVED: Insomnia ICD-10-CM: G47.00  ICD-9-CM: 780.52  4/21/2014 - 6/15/2015        RESOLVED: RLS (restless legs syndrome) ICD-10-CM: G25.81  ICD-9-CM: 333.94  4/21/2014 - 6/15/2015                Studies/Procedures:  EEG 7/7/2021profound encephalopathy without seizures    Hospital course: Beth Reyna is an 78-year-old woman who had a prolonged hospitalization in December 2020 until January 2021 with [id number] which was complicated by renal failure, cardiac arrest, respiratory failure requiring trach and PEG and a prolonged period of severe encephalopathy. She recovered at [place] and then [facility]. In early June she developed stridor and was discovered to have bilateral vocal cord paralysis and was considering the possibility of elective tracheostomy for this. On 7/6, she  developed respiratory distress at home and she was intubated by EMS. She was found to have hypothermia and leukocytosis.   She was treated empirically for a urinary tract infection, and was ultimately found to have an  ESBL-producing Klebsiella infection. During her hospitalization she remained intubated for 9 days and on 7/14 was extubated. She experienced stridor, and required BiPAP overnight, but expressed wishes not to be reintubated or to pursue tracheostomy. Her family was present for discussions regarding goals of care and together they opted for comfort measures. She remained in the ICU where she passed away at 2207 with her family at bedside.          Cindy Pacheco MD

## 2021-07-16 NOTE — PROGRESS NOTES
Death pronouncement  Patient identity confirmed and upon evaluation there was no response to verbal or painful stimulus. On auscultation there were no heart sounds, and no carotid pulse was present. No breathing efforts were noted, no pupillary responses present. Family notified and nursing supervisor aware. No plans for autopsy were expressed. Time of death:  4996  Certifying MD: Alisa Chao MD:  Mikala Tai MD [facility] license [id number].

## 2021-07-18 VITALS
WEIGHT: 161.6 LBS | SYSTOLIC BLOOD PRESSURE: 84 MMHG | HEIGHT: 65 IN | TEMPERATURE: 97.8 F | BODY MASS INDEX: 26.92 KG/M2 | DIASTOLIC BLOOD PRESSURE: 49 MMHG

## 2021-08-02 LAB
BACTERIA SPEC CULT: ABNORMAL
GRAM STN SPEC: ABNORMAL
SERVICE CMNT-IMP: ABNORMAL

## 2021-08-10 LAB
ARTERIAL PATENCY WRIST A: POSITIVE
BASE DEFICIT BLD-SCNC: 5.8 MMOL/L
BDY SITE: ABNORMAL
GAS FLOW.O2 O2 DELIVERY SYS: ABNORMAL L/MIN
GAS FLOW.O2 SETTING OXYMISER: 24 BPM
HCO3 BLD-SCNC: 18.7 MMOL/L (ref 22–26)
O2/TOTAL GAS SETTING VFR VENT: 30 %
PCO2 BLD: 28.7 MMHG (ref 35–45)
PEEP RESPIRATORY: 8 CMH2O
PH BLD: 7.42 [PH] (ref 7.35–7.45)
PO2 BLD: 99 MMHG (ref 80–100)
SAO2 % BLD: 98 % (ref 92–97)
SERVICE CMNT-IMP: ABNORMAL
SPECIMEN TYPE: ABNORMAL
VENTILATION MODE VENT: AC
VT SETTING VENT: 450 ML

## 2021-08-25 LAB
Lab: NORMAL
REFERENCE LAB,REFLB: NORMAL
TEST DESCRIPTION:,ATST: NORMAL

## 2021-10-11 NOTE — PROGRESS NOTES
Date of Outreach Update:  Lennox Palmer was seen. EEG being performed, will re-assess at later date. Spoke with primary RN, Italia Sweeney, reports no major changes. Patient remains lethargic, responding to pain. MD wants MRI. MEWS Score: 3 (01/31/19 2347)  Vitals:    01/31/19 2347 02/01/19 0442 02/01/19 0730 02/01/19 0851   BP: 159/77 170/79 153/76    Pulse: 98 88 80    Resp: 20 20 20    Temp: 98.5 °F (36.9 °C) 98.2 °F (36.8 °C) 98.5 °F (36.9 °C)    SpO2: 91% 92% 99%    Weight:    79 kg (174 lb 2.6 oz)   Height:    5' 4\" (1.626 m)         Pain Assessment  Pain Intensity 1: 0 (01/31/19 1952)        Patient Stated Pain Goal: 0      Previous Outreach assessment has been reviewed. There have been no significant clinical changes since the completion of the last dated Outreach assessment. Will continue to follow up per outreach protocol.     Signed By:   Beronica Gonzalez RN    February 1, 2019 11:10 AM weight-bearing as tolerated

## 2022-02-23 NOTE — PROGRESS NOTES
Ventilator check complete; patient has a #6XLT tracheostomy. Patient is sedated. Patient is not able to follow commands. Breath sounds are coarse and diminished. Trachea is midline, Negative for subcutaneous air, and chest excursion is symmetric. Patient is also Negative for cyanosis and is Negative for pitting edema. All alarms are set and audible. Resuscitation bag is at the head of the bed. Ventilator Settings Mode FIO2 Rate Tidal Volume Pressure PEEP I:E Ratio Pressure control  50 %   28 400 ml  12 cm H2O  12 cm H20  1:1.68 Peak airway pressure: 28 cm H2O Minute ventilation: 9.08 l/min RT Dl 
 [FreeTextEntry1] : Ms. Lockhart continues to experience palpitations as she has in the past.  Her exam shows regular rhythm, normal blood pressure, clear lungs, and a normal cardiac exam.  Her EKG remains within normal limits.\par \par She is stable and doing well.  She will continue on Inderal 60 XL and losartan/hydrochlorothiazide.  She will follow up again in a year.

## 2022-09-29 NOTE — PROGRESS NOTES
Assume care for pt in pre-op. Patient allergies and NPO status verified. Belongings secured. Patient verbalizes understanding of pain scale, expected course of stay and plan of care. Surgical site verified with patient. IV access established by Jesús Lowe via U/S. Blood samples sent to lab for 2nd ABO. Pt had no Covid symptoms or exposure in the past 21 days, no covid test needed at this time. Call light within reach. No further needs at this time. Hourly rounding in place.     Pt to DC today to [facility]. Room and report line given to RADHA Hsu Do to transport-4pm (once pt receives dose of abx)

## 2023-12-24 NOTE — DIABETES MGMT
Patient admitted with septic shock, seen by diabetes educator. Per EMR patient's mother was diabetic and patient's diabetic control has been poor with A1c ranging 8.3-11.4 since 2014. Current A1c of 9.7is likely skewed by anemia. A1c in June was 12. 7. Unsure of which provider is managing patient's diabetes. No definitive regimen on computer during previous admission in February patient was discharged on Lantus 50 units and Humalog SSI. Patient had noted noncompliance with her diet with the nurse finding a box of muffins under the patient's bed. Blood glucose 550 on admission. Patient was placed on an insulin drip managed by Clint Hines. Patient received Lantus 20 units this morning. Hgb 7. 6. WBC 21. 5. Creatinine 2.19. GFR 28. Most recent blood glucose 99 this morning. Attempted to verify medications, PCP, and assess patient compliance and education needs, but patient asleep. Did not disturb. Will follow along and assess and educate patient as patient condition allows. 37.4

## 2024-12-30 NOTE — PROGRESS NOTES
Problem: Falls - Risk of 
Goal: *Absence of Falls Description: Document Monster Bullock Fall Risk and appropriate interventions in the flowsheet. Outcome: Progressing Towards Goal 
Note: Fall Risk Interventions: 
Mobility Interventions: Bed/chair exit alarm, OT consult for ADLs, Patient to call before getting OOB, PT Consult for mobility concerns Medication Interventions: Bed/chair exit alarm, Patient to call before getting OOB, Teach patient to arise slowly Elimination Interventions: Bed/chair exit alarm, Call light in reach, Patient to call for help with toileting needs, Toileting schedule/hourly rounds History of Falls Interventions: Bed/chair exit alarm, Consult care management for discharge planning, Door open when patient unattended Problem: Patient Education: Go to Patient Education Activity Goal: Patient/Family Education Outcome: Progressing Towards Goal 
  
Problem: Pressure Injury - Risk of 
Goal: *Prevention of pressure injury Description: Document Ashu Scale and appropriate interventions in the flowsheet. Outcome: Progressing Towards Goal 
Note: Pressure Injury Interventions: 
Sensory Interventions: Assess changes in LOC, Check visual cues for pain Moisture Interventions: Absorbent underpads, Check for incontinence Q2 hours and as needed, Internal/External urinary devices Activity Interventions: Increase time out of bed, Assess need for specialty bed, Pressure redistribution bed/mattress(bed type) Mobility Interventions: HOB 30 degrees or less, Float heels, PT/OT evaluation, Suspension boots Nutrition Interventions: Document food/fluid/supplement intake, Discuss nutritional consult with provider, Offer support with meals,snacks and hydration Friction and Shear Interventions: Apply protective barrier, creams and emollients, Foam dressings/transparent film/skin sealants Problem: Patient Education: Go to Patient Education Activity Goal: Patient/Family Education Outcome: Progressing Towards Goal 
  
Problem: Urinary Tract Infection - Adult Goal: *Absence of infection signs and symptoms Outcome: Progressing Towards Goal 
  
Problem: Patient Education: Go to Patient Education Activity Goal: Patient/Family Education Outcome: Progressing Towards Goal 
  
Problem: Pain Goal: *Control of Pain Outcome: Progressing Towards Goal 
  
Problem: Patient Education: Go to Patient Education Activity Goal: Patient/Family Education Outcome: Progressing Towards Goal 
  
Problem: Patient Education: Go to Patient Education Activity Goal: Patient/Family Education Outcome: Progressing Towards Goal 
  
Problem: Patient Education: Go to Patient Education Activity Goal: Patient/Family Education Outcome: Progressing Towards Goal 
  
 Home

## (undated) DEVICE — RETRIEVAL BALLOON CATHETER: Brand: EXTRACTOR™ PRO RX

## (undated) DEVICE — BLLN KT O RING ENDOSCP US --

## (undated) DEVICE — CANNULA NSL ORAL AD FOR CAPNOFLEX CO2 O2 AIRLFE

## (undated) DEVICE — BLOCK BITE AD 60FR W/ VELC STRP ADDRESSES MOST PT AND

## (undated) DEVICE — MOUTHPIECE ENDOSCP 20X27MM --

## (undated) DEVICE — KENDALL RADIOLUCENT FOAM MONITORING ELECTRODE RECTANGULAR SHAPE: Brand: KENDALL

## (undated) DEVICE — SPHINCTEROTOME: Brand: JAGTOME RX 44

## (undated) DEVICE — DEVICE LCK BILI RAP EXCHG OLPS --

## (undated) DEVICE — CONNECTOR TBNG OD5-7MM O2 END DISP

## (undated) DEVICE — BE 105-8 BRONCHOSCOPE SWIVEL - 15MM ID/22MM OD (PATIENT PORT) X15MM OD (EQUIPMENT PORT). REUSABLE.  FITS COMPONENTS OF ADULT VENTILATOR CIRCUITS.  MOLDED OF POLYETHERIMIDE. INCLUDES TWO SILICONE RUBBER CAPS; ONE CAP ALLOWS FOR THE USE OF A SUCTIONING CATHETER WHILE THE OTHER CAP ALLOWS FOR THE USE OF A FIBER-OPTIC BRONCHOSCOPE WITHOUT SIGNIFICANT LOSS OF PEEP.: Brand: BE 105-8 BRONCHOSCOPE SWIVEL

## (undated) DEVICE — AIRLIFE™ OXYGEN TUBING 7 FEET (2.1 M) CRUSH RESISTANT OXYGEN TUBING, VINYL TIPPED: Brand: AIRLIFE™

## (undated) DEVICE — REM POLYHESIVE ADULT PATIENT RETURN ELECTRODE: Brand: VALLEYLAB

## (undated) DEVICE — NDL PRT INJ NSAF BLNT 18GX1.5 --

## (undated) DEVICE — SYR 3ML LL TIP 1/10ML GRAD --

## (undated) DEVICE — STERILE POLYISOPRENE POWDER-FREE SURGICAL GLOVES: Brand: PROTEXIS

## (undated) DEVICE — BRONCHOSCOPY PACK: Brand: MEDLINE INDUSTRIES, INC.

## (undated) DEVICE — SNARE POLYP SM W13MMXL240CM SHTH DIA2.4MM OVL FLX DISP

## (undated) DEVICE — KIT GASTMY PERC PEG PULL 20FR -- ENDOVIVE BX/2

## (undated) DEVICE — SINGLE USE BIOPSY VALVE MAJ-210: Brand: SINGLE USE BIOPSY VALVE (STERILE)

## (undated) DEVICE — FORCEPS BX L240CM JAW DIA2.8MM L CAP W/ NDL MIC MESH TOOTH

## (undated) DEVICE — (D)SYR 10ML 1/5ML GRAD NSAF -- PKGING CHANGE USE ITEM 338027

## (undated) DEVICE — ADULT SPO2 SENSOR: Brand: NELLCOR

## (undated) DEVICE — SYR 5ML 1/5 GRAD LL NSAF LF --

## (undated) DEVICE — KENDALL SCD EXPRESS SLEEVES, KNEE LENGTH, MEDIUM: Brand: KENDALL SCD

## (undated) DEVICE — SINGLE USE SUCTION VALVE MAJ-209: Brand: SINGLE USE SUCTION VALVE (STERILE)

## (undated) DEVICE — SYR 10ML LUER LOK 1/5ML GRAD --